# Patient Record
Sex: FEMALE | Race: WHITE | NOT HISPANIC OR LATINO | URBAN - METROPOLITAN AREA
[De-identification: names, ages, dates, MRNs, and addresses within clinical notes are randomized per-mention and may not be internally consistent; named-entity substitution may affect disease eponyms.]

---

## 2018-05-01 ENCOUNTER — INPATIENT (INPATIENT)
Facility: HOSPITAL | Age: 68
LOS: 4 days | Discharge: HOME | DRG: 291 | End: 2018-05-06
Attending: HOSPITALIST | Admitting: HOSPITALIST
Payer: MEDICARE

## 2018-05-01 VITALS
WEIGHT: 153 LBS | OXYGEN SATURATION: 94 % | RESPIRATION RATE: 36 BRPM | TEMPERATURE: 99 F | DIASTOLIC BLOOD PRESSURE: 77 MMHG | HEART RATE: 108 BPM | SYSTOLIC BLOOD PRESSURE: 170 MMHG | HEIGHT: 70 IN

## 2018-05-01 DIAGNOSIS — F32.9 MAJOR DEPRESSIVE DISORDER, SINGLE EPISODE, UNSPECIFIED: ICD-10-CM

## 2018-05-01 DIAGNOSIS — I50.9 HEART FAILURE, UNSPECIFIED: ICD-10-CM

## 2018-05-01 DIAGNOSIS — N18.5 CHRONIC KIDNEY DISEASE, STAGE 5: ICD-10-CM

## 2018-05-01 DIAGNOSIS — D64.9 ANEMIA, UNSPECIFIED: ICD-10-CM

## 2018-05-01 DIAGNOSIS — E11.9 TYPE 2 DIABETES MELLITUS WITHOUT COMPLICATIONS: ICD-10-CM

## 2018-05-01 DIAGNOSIS — I10 ESSENTIAL (PRIMARY) HYPERTENSION: ICD-10-CM

## 2018-05-01 DIAGNOSIS — Z29.9 ENCOUNTER FOR PROPHYLACTIC MEASURES, UNSPECIFIED: ICD-10-CM

## 2018-05-01 DIAGNOSIS — J44.1 CHRONIC OBSTRUCTIVE PULMONARY DISEASE WITH (ACUTE) EXACERBATION: ICD-10-CM

## 2018-05-01 DIAGNOSIS — J44.9 CHRONIC OBSTRUCTIVE PULMONARY DISEASE, UNSPECIFIED: ICD-10-CM

## 2018-05-01 DIAGNOSIS — R06.00 DYSPNEA, UNSPECIFIED: ICD-10-CM

## 2018-05-01 DIAGNOSIS — N18.9 CHRONIC KIDNEY DISEASE, UNSPECIFIED: ICD-10-CM

## 2018-05-01 DIAGNOSIS — N17.9 ACUTE KIDNEY FAILURE, UNSPECIFIED: ICD-10-CM

## 2018-05-01 LAB
ALBUMIN SERPL ELPH-MCNC: 3.1 G/DL — LOW (ref 3.3–5)
ALP SERPL-CCNC: 75 U/L — SIGNIFICANT CHANGE UP (ref 40–120)
ALT FLD-CCNC: 20 U/L — SIGNIFICANT CHANGE UP (ref 12–78)
ANION GAP SERPL CALC-SCNC: 10 MMOL/L — SIGNIFICANT CHANGE UP (ref 5–17)
AST SERPL-CCNC: 18 U/L — SIGNIFICANT CHANGE UP (ref 15–37)
BASOPHILS # BLD AUTO: 0.1 K/UL — SIGNIFICANT CHANGE UP (ref 0–0.2)
BASOPHILS NFR BLD AUTO: 1.2 % — SIGNIFICANT CHANGE UP (ref 0–2)
BILIRUB SERPL-MCNC: 0.3 MG/DL — SIGNIFICANT CHANGE UP (ref 0.2–1.2)
BUN SERPL-MCNC: 54 MG/DL — HIGH (ref 7–23)
CALCIUM SERPL-MCNC: 8.8 MG/DL — SIGNIFICANT CHANGE UP (ref 8.5–10.1)
CHLORIDE SERPL-SCNC: 107 MMOL/L — SIGNIFICANT CHANGE UP (ref 96–108)
CO2 SERPL-SCNC: 22 MMOL/L — SIGNIFICANT CHANGE UP (ref 22–31)
CREAT SERPL-MCNC: 4.1 MG/DL — HIGH (ref 0.5–1.3)
EOSINOPHIL # BLD AUTO: 0.3 K/UL — SIGNIFICANT CHANGE UP (ref 0–0.5)
EOSINOPHIL NFR BLD AUTO: 2.4 % — SIGNIFICANT CHANGE UP (ref 0–6)
FERRITIN SERPL-MCNC: 203 NG/ML — HIGH (ref 15–150)
FOLATE SERPL-MCNC: >20 NG/ML — SIGNIFICANT CHANGE UP
GLUCOSE SERPL-MCNC: 266 MG/DL — HIGH (ref 70–99)
HCT VFR BLD CALC: 28.2 % — LOW (ref 34.5–45)
HGB BLD-MCNC: 9.9 G/DL — LOW (ref 11.5–15.5)
INR BLD: 1.04 RATIO — SIGNIFICANT CHANGE UP (ref 0.88–1.16)
IRON SATN MFR SERPL: 18 % — SIGNIFICANT CHANGE UP (ref 14–50)
IRON SATN MFR SERPL: 45 UG/DL — SIGNIFICANT CHANGE UP (ref 30–160)
LACTATE SERPL-SCNC: 0.9 MMOL/L — SIGNIFICANT CHANGE UP (ref 0.7–2)
LYMPHOCYTES # BLD AUTO: 1.9 K/UL — SIGNIFICANT CHANGE UP (ref 1–3.3)
LYMPHOCYTES # BLD AUTO: 18.1 % — SIGNIFICANT CHANGE UP (ref 13–44)
MCHC RBC-ENTMCNC: 29.5 PG — SIGNIFICANT CHANGE UP (ref 27–34)
MCHC RBC-ENTMCNC: 35 GM/DL — SIGNIFICANT CHANGE UP (ref 32–36)
MCV RBC AUTO: 84.3 FL — SIGNIFICANT CHANGE UP (ref 80–100)
MONOCYTES # BLD AUTO: 0.8 K/UL — SIGNIFICANT CHANGE UP (ref 0–0.9)
MONOCYTES NFR BLD AUTO: 7.8 % — SIGNIFICANT CHANGE UP (ref 1–9)
NEUTROPHILS # BLD AUTO: 7.6 K/UL — HIGH (ref 1.8–7.4)
NEUTROPHILS NFR BLD AUTO: 70.4 % — SIGNIFICANT CHANGE UP (ref 43–77)
PLATELET # BLD AUTO: 420 K/UL — HIGH (ref 150–400)
POTASSIUM SERPL-MCNC: 4.1 MMOL/L — SIGNIFICANT CHANGE UP (ref 3.5–5.3)
POTASSIUM SERPL-SCNC: 4.1 MMOL/L — SIGNIFICANT CHANGE UP (ref 3.5–5.3)
PROT SERPL-MCNC: 7.3 G/DL — SIGNIFICANT CHANGE UP (ref 6–8.3)
PROTHROM AB SERPL-ACNC: 11.4 SEC — SIGNIFICANT CHANGE UP (ref 9.8–12.7)
RBC # BLD: 3.34 M/UL — LOW (ref 3.8–5.2)
RBC # FLD: 14.2 % — SIGNIFICANT CHANGE UP (ref 10.3–14.5)
SODIUM SERPL-SCNC: 139 MMOL/L — SIGNIFICANT CHANGE UP (ref 135–145)
T3 SERPL-MCNC: 87 NG/DL — SIGNIFICANT CHANGE UP (ref 80–200)
T4 AB SER-ACNC: 7.8 UG/DL — SIGNIFICANT CHANGE UP (ref 4.6–12)
TIBC SERPL-MCNC: 245 UG/DL — SIGNIFICANT CHANGE UP (ref 220–430)
UIBC SERPL-MCNC: 200 UG/DL — SIGNIFICANT CHANGE UP (ref 110–370)
VIT B12 SERPL-MCNC: 905 PG/ML — SIGNIFICANT CHANGE UP (ref 232–1245)
WBC # BLD: 10.8 K/UL — HIGH (ref 3.8–10.5)
WBC # FLD AUTO: 10.8 K/UL — HIGH (ref 3.8–10.5)

## 2018-05-01 PROCEDURE — 93010 ELECTROCARDIOGRAM REPORT: CPT

## 2018-05-01 PROCEDURE — 99221 1ST HOSP IP/OBS SF/LOW 40: CPT

## 2018-05-01 PROCEDURE — 12345: CPT | Mod: NC

## 2018-05-01 PROCEDURE — 36558 INSERT TUNNELED CV CATH: CPT | Mod: AS

## 2018-05-01 PROCEDURE — 71250 CT THORAX DX C-: CPT | Mod: 26

## 2018-05-01 PROCEDURE — 93306 TTE W/DOPPLER COMPLETE: CPT | Mod: 26

## 2018-05-01 PROCEDURE — 99223 1ST HOSP IP/OBS HIGH 75: CPT | Mod: AI,GC

## 2018-05-01 PROCEDURE — 99285 EMERGENCY DEPT VISIT HI MDM: CPT

## 2018-05-01 PROCEDURE — 71045 X-RAY EXAM CHEST 1 VIEW: CPT | Mod: 26

## 2018-05-01 RX ORDER — FUROSEMIDE 40 MG
60 TABLET ORAL ONCE
Qty: 0 | Refills: 0 | Status: COMPLETED | OUTPATIENT
Start: 2018-05-01 | End: 2018-05-01

## 2018-05-01 RX ORDER — GLUCAGON INJECTION, SOLUTION 0.5 MG/.1ML
1 INJECTION, SOLUTION SUBCUTANEOUS ONCE
Qty: 0 | Refills: 0 | Status: DISCONTINUED | OUTPATIENT
Start: 2018-05-01 | End: 2018-05-04

## 2018-05-01 RX ORDER — METOPROLOL TARTRATE 50 MG
50 TABLET ORAL DAILY
Qty: 0 | Refills: 0 | Status: DISCONTINUED | OUTPATIENT
Start: 2018-05-01 | End: 2018-05-04

## 2018-05-01 RX ORDER — FUROSEMIDE 40 MG
40 TABLET ORAL ONCE
Qty: 0 | Refills: 0 | Status: DISCONTINUED | OUTPATIENT
Start: 2018-05-01 | End: 2018-05-01

## 2018-05-01 RX ORDER — SODIUM CHLORIDE 9 MG/ML
1000 INJECTION INTRAMUSCULAR; INTRAVENOUS; SUBCUTANEOUS ONCE
Qty: 0 | Refills: 0 | Status: COMPLETED | OUTPATIENT
Start: 2018-05-01 | End: 2018-05-01

## 2018-05-01 RX ORDER — AMLODIPINE BESYLATE 2.5 MG/1
2.5 TABLET ORAL AT BEDTIME
Qty: 0 | Refills: 0 | Status: DISCONTINUED | OUTPATIENT
Start: 2018-05-01 | End: 2018-05-04

## 2018-05-01 RX ORDER — METFORMIN HYDROCHLORIDE 850 MG/1
1 TABLET ORAL
Qty: 0 | Refills: 0 | COMMUNITY

## 2018-05-01 RX ORDER — DEXTROSE 50 % IN WATER 50 %
1 SYRINGE (ML) INTRAVENOUS ONCE
Qty: 0 | Refills: 0 | Status: DISCONTINUED | OUTPATIENT
Start: 2018-05-01 | End: 2018-05-04

## 2018-05-01 RX ORDER — DEXTROSE 50 % IN WATER 50 %
12.5 SYRINGE (ML) INTRAVENOUS ONCE
Qty: 0 | Refills: 0 | Status: DISCONTINUED | OUTPATIENT
Start: 2018-05-01 | End: 2018-05-04

## 2018-05-01 RX ORDER — SPIRONOLACTONE 25 MG/1
50 TABLET, FILM COATED ORAL ONCE
Qty: 0 | Refills: 0 | Status: COMPLETED | OUTPATIENT
Start: 2018-05-01 | End: 2018-05-01

## 2018-05-01 RX ORDER — TIOTROPIUM BROMIDE 18 UG/1
1 CAPSULE ORAL; RESPIRATORY (INHALATION) DAILY
Qty: 0 | Refills: 0 | Status: DISCONTINUED | OUTPATIENT
Start: 2018-05-01 | End: 2018-05-04

## 2018-05-01 RX ORDER — ATORVASTATIN CALCIUM 80 MG/1
1 TABLET, FILM COATED ORAL
Qty: 0 | Refills: 0 | COMMUNITY

## 2018-05-01 RX ORDER — ALBUTEROL 90 UG/1
2.5 AEROSOL, METERED ORAL EVERY 6 HOURS
Qty: 0 | Refills: 0 | Status: DISCONTINUED | OUTPATIENT
Start: 2018-05-01 | End: 2018-05-04

## 2018-05-01 RX ORDER — ASPIRIN/CALCIUM CARB/MAGNESIUM 324 MG
81 TABLET ORAL DAILY
Qty: 0 | Refills: 0 | Status: DISCONTINUED | OUTPATIENT
Start: 2018-05-01 | End: 2018-05-01

## 2018-05-01 RX ORDER — INSULIN GLARGINE 100 [IU]/ML
7 INJECTION, SOLUTION SUBCUTANEOUS AT BEDTIME
Qty: 0 | Refills: 0 | Status: DISCONTINUED | OUTPATIENT
Start: 2018-05-01 | End: 2018-05-01

## 2018-05-01 RX ORDER — INSULIN LISPRO 100/ML
VIAL (ML) SUBCUTANEOUS
Qty: 0 | Refills: 0 | Status: DISCONTINUED | OUTPATIENT
Start: 2018-05-01 | End: 2018-05-02

## 2018-05-01 RX ORDER — FUROSEMIDE 40 MG
40 TABLET ORAL
Qty: 0 | Refills: 0 | Status: DISCONTINUED | OUTPATIENT
Start: 2018-05-01 | End: 2018-05-01

## 2018-05-01 RX ORDER — HEPARIN SODIUM 5000 [USP'U]/ML
5000 INJECTION INTRAVENOUS; SUBCUTANEOUS EVERY 12 HOURS
Qty: 0 | Refills: 0 | Status: DISCONTINUED | OUTPATIENT
Start: 2018-05-01 | End: 2018-05-04

## 2018-05-01 RX ORDER — AMLODIPINE BESYLATE 2.5 MG/1
5 TABLET ORAL DAILY
Qty: 0 | Refills: 0 | Status: DISCONTINUED | OUTPATIENT
Start: 2018-05-01 | End: 2018-05-04

## 2018-05-01 RX ORDER — SODIUM CHLORIDE 9 MG/ML
1000 INJECTION, SOLUTION INTRAVENOUS
Qty: 0 | Refills: 0 | Status: DISCONTINUED | OUTPATIENT
Start: 2018-05-01 | End: 2018-05-04

## 2018-05-01 RX ORDER — IPRATROPIUM/ALBUTEROL SULFATE 18-103MCG
3 AEROSOL WITH ADAPTER (GRAM) INHALATION EVERY 6 HOURS
Qty: 0 | Refills: 0 | Status: DISCONTINUED | OUTPATIENT
Start: 2018-05-01 | End: 2018-05-01

## 2018-05-01 RX ORDER — SODIUM CHLORIDE 9 MG/ML
1000 INJECTION INTRAMUSCULAR; INTRAVENOUS; SUBCUTANEOUS
Qty: 0 | Refills: 0 | Status: DISCONTINUED | OUTPATIENT
Start: 2018-05-01 | End: 2018-05-01

## 2018-05-01 RX ORDER — DEXTROSE 50 % IN WATER 50 %
25 SYRINGE (ML) INTRAVENOUS ONCE
Qty: 0 | Refills: 0 | Status: DISCONTINUED | OUTPATIENT
Start: 2018-05-01 | End: 2018-05-04

## 2018-05-01 RX ORDER — INSULIN GLARGINE 100 [IU]/ML
14 INJECTION, SOLUTION SUBCUTANEOUS AT BEDTIME
Qty: 0 | Refills: 0 | Status: DISCONTINUED | OUTPATIENT
Start: 2018-05-01 | End: 2018-05-02

## 2018-05-01 RX ORDER — ENOXAPARIN SODIUM 100 MG/ML
40 INJECTION SUBCUTANEOUS DAILY
Qty: 0 | Refills: 0 | Status: DISCONTINUED | OUTPATIENT
Start: 2018-05-01 | End: 2018-05-01

## 2018-05-01 RX ORDER — ERYTHROPOIETIN 10000 [IU]/ML
10000 INJECTION, SOLUTION INTRAVENOUS; SUBCUTANEOUS ONCE
Qty: 0 | Refills: 0 | Status: COMPLETED | OUTPATIENT
Start: 2018-05-01 | End: 2018-05-01

## 2018-05-01 RX ORDER — FUROSEMIDE 40 MG
40 TABLET ORAL
Qty: 0 | Refills: 0 | Status: DISCONTINUED | OUTPATIENT
Start: 2018-05-01 | End: 2018-05-04

## 2018-05-01 RX ORDER — IPRATROPIUM/ALBUTEROL SULFATE 18-103MCG
3 AEROSOL WITH ADAPTER (GRAM) INHALATION ONCE
Qty: 0 | Refills: 0 | Status: COMPLETED | OUTPATIENT
Start: 2018-05-01 | End: 2018-05-01

## 2018-05-01 RX ORDER — SODIUM CHLORIDE 9 MG/ML
1000 INJECTION INTRAMUSCULAR; INTRAVENOUS; SUBCUTANEOUS ONCE
Qty: 0 | Refills: 0 | Status: DISCONTINUED | OUTPATIENT
Start: 2018-05-01 | End: 2018-05-01

## 2018-05-01 RX ORDER — LANOLIN ALCOHOL/MO/W.PET/CERES
3 CREAM (GRAM) TOPICAL AT BEDTIME
Qty: 0 | Refills: 0 | Status: DISCONTINUED | OUTPATIENT
Start: 2018-05-01 | End: 2018-05-04

## 2018-05-01 RX ADMIN — HEPARIN SODIUM 5000 UNIT(S): 5000 INJECTION INTRAVENOUS; SUBCUTANEOUS at 17:11

## 2018-05-01 RX ADMIN — AMLODIPINE BESYLATE 2.5 MILLIGRAM(S): 2.5 TABLET ORAL at 21:30

## 2018-05-01 RX ADMIN — SODIUM CHLORIDE 60 MILLILITER(S): 9 INJECTION INTRAMUSCULAR; INTRAVENOUS; SUBCUTANEOUS at 06:32

## 2018-05-01 RX ADMIN — Medication 3 MILLILITER(S): at 03:40

## 2018-05-01 RX ADMIN — Medication 40 MILLIGRAM(S): at 17:11

## 2018-05-01 RX ADMIN — Medication 5: at 17:12

## 2018-05-01 RX ADMIN — SPIRONOLACTONE 50 MILLIGRAM(S): 25 TABLET, FILM COATED ORAL at 14:07

## 2018-05-01 RX ADMIN — ERYTHROPOIETIN 10000 UNIT(S): 10000 INJECTION, SOLUTION INTRAVENOUS; SUBCUTANEOUS at 13:40

## 2018-05-01 RX ADMIN — Medication 50 MILLIGRAM(S): at 11:57

## 2018-05-01 RX ADMIN — Medication 125 MILLIGRAM(S): at 03:45

## 2018-05-01 RX ADMIN — SODIUM CHLORIDE 1000 MILLILITER(S): 9 INJECTION INTRAMUSCULAR; INTRAVENOUS; SUBCUTANEOUS at 04:13

## 2018-05-01 RX ADMIN — SODIUM CHLORIDE 60 MILLILITER(S): 9 INJECTION INTRAMUSCULAR; INTRAVENOUS; SUBCUTANEOUS at 09:51

## 2018-05-01 RX ADMIN — Medication 81 MILLIGRAM(S): at 11:57

## 2018-05-01 RX ADMIN — Medication 100 MILLIGRAM(S): at 06:22

## 2018-05-01 RX ADMIN — Medication 60 MILLIGRAM(S): at 11:57

## 2018-05-01 RX ADMIN — INSULIN GLARGINE 14 UNIT(S): 100 INJECTION, SOLUTION SUBCUTANEOUS at 22:17

## 2018-05-01 RX ADMIN — Medication 3 MILLIGRAM(S): at 21:30

## 2018-05-01 RX ADMIN — Medication 60 MILLIGRAM(S): at 12:51

## 2018-05-01 RX ADMIN — Medication 100 MILLIGRAM(S): at 21:30

## 2018-05-01 RX ADMIN — Medication 100 MILLIGRAM(S): at 17:12

## 2018-05-01 RX ADMIN — Medication 5: at 13:11

## 2018-05-01 RX ADMIN — AMLODIPINE BESYLATE 5 MILLIGRAM(S): 2.5 TABLET ORAL at 11:57

## 2018-05-01 RX ADMIN — Medication 5: at 09:12

## 2018-05-01 NOTE — ED PROVIDER NOTE - PSH
coronary stent 2007    s/p Ovarian cyst removal    s/p surgical removal of benign Skin lesion epigastric area

## 2018-05-01 NOTE — H&P ADULT - PROBLEM SELECTOR PLAN 2
Acute.   - continue NSS @ 75 cc/hr  - HOLD NSAIDs, ACE, ARBs   - trend BUN/Cr Acute. No prior history of HF, elevated pro BNP (33,668).   - f/u TTE  - Cardio ([facility]) consulted.

## 2018-05-01 NOTE — H&P ADULT - PROBLEM SELECTOR PLAN 7
DVT ppx - Heparin 5000 subQ q12H  Diet: Consistent Carb   Activity - out of bed to sarabjit  Fall precautions

## 2018-05-01 NOTE — H&P ADULT - PROBLEM SELECTOR PLAN 4
Chronic. Elevated due to respiratory distress   - Continue home meds with hold parameters Chronic. On Lantus 14 units qHS  - START Lantus 7 units qHS  - Low dose ISS, Accuchecks, Hypoglycemic protocol   - f/u HgbA1c  - Diet: Consistent carb

## 2018-05-01 NOTE — PROGRESS NOTE ADULT - ASSESSMENT
68 y/o  female PMHx Type 2 Diabetes on lantus, HTN, CAD s/p stent (2007), Depression, Anxiety, former smoker brought in by EMS for shortness of breath admitted to telemetry for acute CHF, COPD exacerbation, DAVIS on CKD 68 y/o  female PMHx Type 2 Diabetes on lantus, HTN, CAD s/p stent (2007), Depression, Anxiety, former smoker brought in by EMS for shortness of breath, admitted with CHF, DAVIS on CKD

## 2018-05-01 NOTE — ED PROVIDER NOTE - PMH
CAD (coronary artery disease)    Depression    Diabetes mellitus II    h/o Anxiety attack    h/o Hepatitis A 1969  currently resolved, no symptoms  h/o Myocardial infarct 2007    h/o Smoking  quitted 3/2012  HTN (hypertension)    Murmur, cardiac    PAD (peripheral artery disease)

## 2018-05-01 NOTE — H&P ADULT - NSHPREVIEWOFSYSTEMS_GEN_ALL_CORE
Constitutional: denies fever, chills, diaphoresis   HEENT: denies blurry vision, difficulty hearing  Respiratory: denies SOB, ADAMSON, cough, sputum production, wheezing, hemoptysis  Cardiovascular: denies CP, palpitations, edema  Gastrointestinal: denies nausea, vomiting, diarrhea, constipation, abdominal pain, melena, hematochezia   Genitourinary: denies dysuria, frequency, urgency, hematuria   Skin/Breast: denies rash, itching  Musculoskeletal: denies myalgias, joint swelling, muscle weakness  Neurologic: denies headache, weakness, dizziness, paresthesias, numbness/tingling  Psychiatric: denies feeling anxious, depressed, suicidal, homicidal thoughts  Hematology/Oncology: denies bruising, tender or enlarged lymph nodes   ROS negative except as noted above Constitutional: (-) denies fever, chills, diaphoresis   HEENT: denies blurry vision, difficulty hearing  Respiratory: (+) admits SOB, ADAMSON, cough, sputum production  Cardiovascular: (-) denies CP, palpitations, (+) edema  Gastrointestinal: denies nausea, vomiting, diarrhea, constipation, abdominal pain, melena, hematochezia   Genitourinary: denies dysuria, frequency, urgency, hematuria   Skin/Breast: denies rash, itching  Musculoskeletal: denies myalgias, joint swelling, muscle weakness  Neurologic: denies headache, weakness, dizziness, paresthesias, numbness/tingling  Psychiatric: denies feeling anxious, depressed, suicidal, homicidal thoughts  Hematology/Oncology: denies bruising, tender or enlarged lymph nodes   ROS negative except as noted above

## 2018-05-01 NOTE — ED PROVIDER NOTE - CARE PLAN
Principal Discharge DX:	COPD with acute exacerbation  Secondary Diagnosis:	Acute renal failure Principal Discharge DX:	COPD with acute exacerbation  Secondary Diagnosis:	Acute renal failure  Secondary Diagnosis:	Pleural effusion

## 2018-05-01 NOTE — H&P ADULT - PROBLEM SELECTOR PLAN 1
Likely viral vs bacterial pna.   - Admit to telemetry   - Continuous pulse ox  - Continue Duonebs q6H, Solumedrol, Robitussin Likely viral vs bacterial pna.   - Admit to telemetry   - Continuous pulse ox  - Continue Duonebs q6H, Solumedrol, Robitussin  - Pulm (Dr. Oneill) consulted. etiology unknown  - Admit to telemetry   - Continuous pulse ox  - Continue Duonebs q6H, Solumedrol, Robitussin  - Pulm (Dr. Oneill) consulted.

## 2018-05-01 NOTE — H&P ADULT - HISTORY OF PRESENT ILLNESS
Patient is a 66 y/o  female PMHx Type 2 Diabetes, HTN, CAD s/p stents (2007)  brought in by EMS for shortness of breath.       In ED afebrile, tachycardic, hypertensive, SpO2 81% on RA, 94% on 2LNC. Labs significant for a WBC 10.8, BUN 54, Cr 4.1, Glucose 266, Lactate 0.9. CT chest small to moderate b/l pleural effusions, right > left. Meds: Duonebs, Solumedrol, Patient is a 68 y/o  female PMHx Type 2 Diabetes on lantus, HTN, CAD s/p stent (2007), Depression, Anxiety, former smoker brought in by EMS for shortness of breath. The sob has progressed over 1 week. The sob is associated with a productive cough with clear sputum. The sob/cough are worse with activity and relieved with rest. She admits to swelling in the feet that has increased this week. She denies recent travel or sick contacts. She received a flu vaccine this year. She denies fever, chills, cp, palpitations, N/V/C/D, abdominal pain      In ED afebrile, tachycardic, hypertensive, SpO2 81% on RA, 94% on 2LNC. Labs significant for a WBC 10.8, BUN 54, Cr 4.1, Glucose 266, Lactate 0.9. CT chest small to moderate b/l pleural effusions, right > left. Meds: Duonebs, Solumedrol, Patient is a 66 y/o  female PMHx Type 2 Diabetes on lantus, HTN, CAD s/p stent (2007), Depression, Anxiety, former smoker brought in by EMS for shortness of breath. The sob has progressed over 1 week. The sob is associated with a productive cough with clear sputum. The sob/cough are worse with activity and relieved with rest. She admits to swelling in the feet that has increased this week. She denies recent travel or sick contacts. She received a flu vaccine this year. She denies fever, chills, cp, palpitations, N/V/C/D, abdominal pain.     In ED afebrile, tachycardic, hypertensive, SpO2 81% on RA, 94% on 2LNC. Labs significant for a WBC 10.8, BUN 54, Cr 4.1, Glucose 266, Lactate 0.9. CT chest small to moderate b/l pleural effusions, right > left. Meds: Duonebs, Solumedrol,

## 2018-05-01 NOTE — CONSULT NOTE ADULT - ATTENDING COMMENTS
I saw and examined the patient personally. Spoke with above provider regarding this case. I reviewed the above findings completely.  I agree with the above history, physical, and plan which I have edited where appropriate.

## 2018-05-01 NOTE — CONSULT NOTE ADULT - PROBLEM SELECTOR RECOMMENDATION 9
dyspnea,   chf  pulm edema  renal failure  hx of copd, no evidence of bronchospasm  will dc Solumedrol  will add Spiriva for daily inhalation use  will change NEBS to PRN for SOB and or Wheezing  o2 support  keep sat > 88 pct  TTE pending  cardio eval pending  pt is on IVF for renal failure, suspect this is a chronic issue, would exercise caution with IVF - pulm edema and effusions evident on CT chest  I and O  serial labs, vs and HD monitoring  effusions described and noted on ct chest reviewed  will hold off on thoracentesis eval at present, not big enough to warrant immediate thoracentesis, medical management for now  will check urine tox  will check Thyroid panel

## 2018-05-01 NOTE — ED ADULT NURSE NOTE - OBJECTIVE STATEMENT
patient a/o x 4 with an anxious affect c/o SOB for a few days gradually get worse.  patient states she gets SOB after little movement.  breath sounds diminished bilaterally. breathing treatments initiated upon arrival, pending results of initial lab work.  RN will continue to monitor patient closely while on cardiac monitor

## 2018-05-01 NOTE — ED ADULT NURSE NOTE - CAS TRG GENERAL NORM CIRC DET
Pt will be going to [facility] for further evaluation and treatment. Pt going by [facility]. Verbal report given to Liudmila ARZATE   Capillary refill less/equal to 2 seconds/Strong peripheral pulses

## 2018-05-01 NOTE — PROGRESS NOTE ADULT - PROBLEM SELECTOR PLAN 3
-DAVIS on CKD  -continue NSS @ 60 cc/hr  - HOLD NSAIDs, ACE, ARBs   - trend BUN/Cr  - Nephro (Dr. Reina) consulted. Normal MCV  Order iron panel, folate, b12 Normal MCV has ch anemia 2/2 CKD  Order iron panel, folate, b12.

## 2018-05-01 NOTE — PROGRESS NOTE ADULT - PROBLEM SELECTOR PLAN 4
Normal MCV  Order iron panel, folate, b12 -This is likely contributing to patient's shortness of breath, however patient has never been diagnosed with COPD, will need PFT outpatient.  -Continue d Smoker, no non Hx of COPD.   out pt f/u with pul. and PFTs.

## 2018-05-01 NOTE — CONSULT NOTE ADULT - ASSESSMENT
Patient is a 68 y/o  female PMHx Type 2 Diabetes on lantus, HTN, CAD s/p stent (2007), Depression, Anxiety, former smoker brought in by EMS for shortness of breath. The sob has progressed over 1 week. The sob is associated with a productive cough with clear sputum. The sob/cough are worse with activity and relieved with rest. She admits to swelling in the feet that has increased this week. She denies recent travel or sick contacts. She received a flu vaccine this year. She denies fever, chills, cp, palpitations, N/V/C/D, abdominal pain. Patient is a 66 y/o  female PMHx Type 2 Diabetes on lantus, HTN, CAD s/p stent (2007), Depression, Anxiety, former smoker brought in by EMS for shortness of breath. pt was seen in my office last week with no complains , try to set up hemodialysis for pt

## 2018-05-01 NOTE — CONSULT NOTE ADULT - SUBJECTIVE AND OBJECTIVE BOX
Date/Time Patient Seen:  		  Referring MD:   Data Reviewed	       Patient is a 67y old  Female who presents with a chief complaint of dypsnea (01 May 2018 05:21)      Subjective/HPI  in bed  seen and examined  vs and meds reviewed  alert  oriented  verbal  sob  aldana  hx of renal disease  in ED labs show cr > 4  cxr and ct chest reviewed    H and P reviewed    History of Present Illness:  Reason for Admission: dypsnea  History of Present Illness:   Patient is a 68 y/o  female PMHx Type 2 Diabetes on lantus, HTN, CAD s/p stent (2007), Depression, Anxiety, former smoker brought in by EMS for shortness of breath. The sob has progressed over 1 week. The sob is associated with a productive cough with clear sputum. The sob/cough are worse with activity and relieved with rest. She admits to swelling in the feet that has increased this week. She denies recent travel or sick contacts. She received a flu vaccine this year. She denies fever, chills, cp, palpitations, N/V/C/D, abdominal pain.      PAST MEDICAL & SURGICAL HISTORY:  h/o Smoking: quitted 3/2012  Murmur, cardiac  PAD (peripheral artery disease)  h/o Hepatitis A 1969: currently resolved, no symptoms  CAD (coronary artery disease)  CAD (coronary artery disease)  h/o Myocardial infarct 2007  Depression  h/o Anxiety attack  HTN (hypertension)  Diabetes mellitus II  s/p surgical removal of benign Skin lesion epigastric area  s/p Ovarian cyst removal  coronary stent 2007        Medication list         MEDICATIONS  (STANDING):  amLODIPine   Tablet 2.5 milliGRAM(s) Oral at bedtime  amLODIPine   Tablet 5 milliGRAM(s) Oral daily  aspirin enteric coated 81 milliGRAM(s) Oral daily  dextrose 5%. 1000 milliLiter(s) (50 mL/Hr) IV Continuous <Continuous>  dextrose 50% Injectable 12.5 Gram(s) IV Push once  dextrose 50% Injectable 25 Gram(s) IV Push once  dextrose 50% Injectable 25 Gram(s) IV Push once  guaiFENesin   Syrup  (Sugar-Free) 100 milliGRAM(s) Oral every 6 hours  heparin  Injectable 5000 Unit(s) SubCutaneous every 12 hours  imipramine 50 milliGRAM(s) Oral daily  insulin glargine Injectable (LANTUS) 7 Unit(s) SubCutaneous at bedtime  insulin lispro (HumaLOG) corrective regimen sliding scale   SubCutaneous three times a day before meals  metoprolol succinate ER 50 milliGRAM(s) Oral daily  sodium chloride 0.9%. 1000 milliLiter(s) (60 mL/Hr) IV Continuous <Continuous>  tiotropium 18 MICROgram(s) Capsule 1 Capsule(s) Inhalation daily    MEDICATIONS  (PRN):  ALBUTerol    0.083% 2.5 milliGRAM(s) Nebulizer every 6 hours PRN Shortness of Breath and/or Wheezing  dextrose Gel 1 Dose(s) Oral once PRN Blood Glucose LESS THAN 70 milliGRAM(s)/deciliter  glucagon  Injectable 1 milliGRAM(s) IntraMuscular once PRN Glucose LESS THAN 70 milligrams/deciliter         Vitals log        ICU Vital Signs Last 24 Hrs  T(C): 36.9 (01 May 2018 05:25), Max: 37 (01 May 2018 03:23)  T(F): 98.4 (01 May 2018 05:25), Max: 98.6 (01 May 2018 03:23)  HR: 102 (01 May 2018 05:25) (102 - 108)  BP: 148/60 (01 May 2018 05:25) (148/60 - 170/77)  BP(mean): --  ABP: --  ABP(mean): --  RR: 23 (01 May 2018 05:25) (23 - 36)  SpO2: 95% (01 May 2018 05:25) (94% - 95%)           Input and Output:  I&O's Detail    30 Apr 2018 07:01  -  01 May 2018 07:00  --------------------------------------------------------  IN:    Sodium Chloride 0.9% IV Bolus: 1000 mL  Total IN: 1000 mL    OUT:  Total OUT: 0 mL    Total NET: 1000 mL          Lab Data                        9.9    10.8  )-----------( 420      ( 01 May 2018 03:45 )             28.2     05-01    139  |  107  |  54<H>  ----------------------------<  266<H>  4.1   |  22  |  4.10<H>    Ca    8.8      01 May 2018 03:45    TPro  7.3  /  Alb  3.1<L>  /  TBili  0.3  /  DBili  x   /  AST  18  /  ALT  20  /  AlkPhos  75  05-01      ex smoker  non drinker     lives at home        Review of Systems	  sob  aldana      Objective     Physical Examination    extr trace edema  head at  heart s1s2  lung dec BS  abd soft  no wheezing      Pertinent Lab findings & Imaging      Renteria:  NO   Adequate UO     I&O's Detail    30 Apr 2018 07:01  -  01 May 2018 07:00  --------------------------------------------------------  IN:    Sodium Chloride 0.9% IV Bolus: 1000 mL  Total IN: 1000 mL    OUT:  Total OUT: 0 mL    Total NET: 1000 mL               Discussed with:     Cultures:	        Radiology    ct chest atelectasis and effusions bilateral

## 2018-05-01 NOTE — H&P ADULT - ASSESSMENT
68 y/o  female PMHx Type 2 Diabetes on lantus, HTN, CAD s/p stent (2007), Depression, Anxiety, former smoker brought in by EMS for shortness of breath admitted to telemetry for acute CHF, COPD exacerbation, DAVIS.

## 2018-05-01 NOTE — ED PROVIDER NOTE - OBJECTIVE STATEMENT
66yo female bib ems with sob for a few days. pt c/o worsening cough and sob, no fever, chills, pt denies diagnosis of copd states she has never been to a pulmonologist, hx of smoking but not now, no recent travel or sick contacts,

## 2018-05-01 NOTE — PROGRESS NOTE ADULT - PROBLEM SELECTOR PLAN 5
-Chronic. On Lantus 14 units qHS at home  - Started on Lantus 7 units qHS, however blood glucose continues to be above 250, increase insulin dose  - Low dose Steve PÉREZ, Hypoglycemic protocol   - f/u HgbA1c  - Diet: Consistent carb -Chronic. On Lantus 14 units qHS at home  - Started on Lantus 14 units qHS, however blood glucose continues to be above 250, increase insulin dose  - Low dose Steve PÉREZ, Hypoglycemic protocol   - f/u HgbA1c  - Diet: Consistent carb

## 2018-05-01 NOTE — H&P ADULT - PROBLEM SELECTOR PLAN 6
DVT ppx - Lovenox  Diet: Consistent Carb   Activity - out of bed to sarabjit  Fall precautions Chronic.   - Continue Imipramine

## 2018-05-01 NOTE — PROGRESS NOTE ADULT - PROBLEM SELECTOR PLAN 1
etiology unknown  - Admit to telemetry   - Continuous pulse ox  - Continue Duonebs q6H, Solumedrol, Robitussin  - Pulm (Dr. Oneill) consulted. -Patient has no prior history of HF, however pro BNP (33,668).   -Start on Lasix 40mg BID  - f/u TTE  - Cardio ([facility]) consulted.  -Patient follows Dr. Flo Rios (cardio). Call about cardiac history  -Strict I and Os -Patient has no prior history of HF, however pro BNP (33,668).   -Start on Lasix 40mg IV BID. additional Lasix given as per renal  - f/u TTE  - Cardio ([facility]) consulted.  -Patient follows Dr. Flo Rios (cardio). Call about cardiac history  -Strict I and Os

## 2018-05-01 NOTE — CONSULT NOTE ADULT - PROBLEM SELECTOR RECOMMENDATION 2
ANEMIA PLAN:  Anemia of chronic disease:  Well controlled by Aranesp  H and H subtherapeutic .  We will continue Aranesp aiming for a HCT of 32-36 %.   We will monitor Iron stores, B12 and RBC folate . ANEMIA PLAN:  Anemia of chronic disease:  Well controlled by epogen   H and H subtherapeutic .  We will continue epogen  aiming for a HCT of 32-36 %.   We will monitor Iron stores, B12 and RBC folate .

## 2018-05-01 NOTE — ED ADULT NURSE REASSESSMENT NOTE - NS ED NURSE REASSESS COMMENT FT1
received report from Donaldo OHARA md aware of vitals. safety maintained. pt mentating at baseline. no change in neuro status. pt denies pain at this time patient on cardiac monitor. pt placed in position of comfort, given warm blankets. safety maintained. will continue to monitor.

## 2018-05-01 NOTE — CONSULT NOTE ADULT - PROBLEM SELECTOR RECOMMENDATION 9
ckd stage 5 due to maney years of poor dm controlled   pt may need start hd soon  fluid overload dc iv fluid   diuresis per cardiology ckd stage 5 due to maney years of poor dm controlled   pt may need start hd soon  fluid overload dc iv fluid   start lasix 80 mg iv q 12

## 2018-05-01 NOTE — H&P ADULT - PROBLEM SELECTOR PLAN 3
Chronic. Not on insulin   - Low dose ISS, Accuchecks, Hypoglycemic protocol   - f/u HgbA1c  - Diet: Consistent carb Acute.   - continue NSS @ 60 cc/hr  - HOLD NSAIDs, ACE, ARBs   - trend BUN/Cr Acute on Chronic?  - continue NSS @ 60 cc/hr  - HOLD NSAIDs, ACE, ARBs   - trend BUN/Cr  - Nephro (Dr. Reina) consulted.

## 2018-05-01 NOTE — CONSULT NOTE ADULT - SUBJECTIVE AND OBJECTIVE BOX
[facility] - Lorraine Gruber, Dee Larry, Joseph Carson Savella  Office Number: [phone]    Initial Consult Note    CHIEF COMPLAINT: Patient is a 67y old  Female who presents with a chief complaint of dypsnea (01 May 2018 05:21)      HPI: Patient is a 68 y/o  female PMHx Type 2 Diabetes on lantus, HTN, CAD s/p stent (), Depression, Anxiety, former smoker brought in by EMS for shortness of breath. The sob has progressed over 1 week. The sob is associated with a productive cough with clear sputum. The sob/cough are worse with activity and relieved with rest. She admits to swelling in the feet that has increased this week. Patient states that she gets sob when going up stairs and walking down a block. She came to ED because she was unable to sleep from difficulty breathing. She denies recent travel or sick contacts. She received a flu vaccine this year. She denies fever, chills, cp, palpitations, N/V/C/D, abdominal pain.     PAST MEDICAL & SURGICAL HISTORY:  h/o Smoking: quitted 3/2012  Murmur, cardiac  PAD (peripheral artery disease)  h/o Hepatitis A 1969: currently resolved, no symptoms  CAD (coronary artery disease)  h/o Myocardial infarct   Depression  h/o Anxiety attack  HTN (hypertension)  Diabetes mellitus II  s/p surgical removal of benign Skin lesion epigastric area  s/p Ovarian cyst removal  coronary stent     SOCIAL HISTORY:  Tobacco: former 2 pack/day, quit last year  Etoh: denies    FAMILY HISTORY:  Father-  at 59, MI  Mother-  at 83, Alzheimer's    MEDICATIONS  (STANDING):  amLODIPine   Tablet 2.5 milliGRAM(s) Oral at bedtime  amLODIPine   Tablet 5 milliGRAM(s) Oral daily  aspirin enteric coated 81 milliGRAM(s) Oral daily  dextrose 5%. 1000 milliLiter(s) (50 mL/Hr) IV Continuous <Continuous>  dextrose 50% Injectable 12.5 Gram(s) IV Push once  dextrose 50% Injectable 25 Gram(s) IV Push once  dextrose 50% Injectable 25 Gram(s) IV Push once  guaiFENesin   Syrup  (Sugar-Free) 100 milliGRAM(s) Oral every 6 hours  heparin  Injectable 5000 Unit(s) SubCutaneous every 12 hours  imipramine 50 milliGRAM(s) Oral daily  insulin glargine Injectable (LANTUS) 7 Unit(s) SubCutaneous at bedtime  insulin lispro (HumaLOG) corrective regimen sliding scale   SubCutaneous three times a day before meals  metoprolol succinate ER 50 milliGRAM(s) Oral daily  sodium chloride 0.9%. 1000 milliLiter(s) (60 mL/Hr) IV Continuous <Continuous>  tiotropium 18 MICROgram(s) Capsule 1 Capsule(s) Inhalation daily    MEDICATIONS  (PRN):  ALBUTerol    0.083% 2.5 milliGRAM(s) Nebulizer every 6 hours PRN Shortness of Breath and/or Wheezing  dextrose Gel 1 Dose(s) Oral once PRN Blood Glucose LESS THAN 70 milliGRAM(s)/deciliter  glucagon  Injectable 1 milliGRAM(s) IntraMuscular once PRN Glucose LESS THAN 70 milligrams/deciliter    Allergies  latex (Unknown)  No Known Drug Allergies    REVIEW OF SYSTEMS:  CONSTITUTIONAL: +chills, No weakness, fevers  EYES/ENT: No visual changes;  No vertigo or throat pain   NECK: No pain or stiffness  RESPIRATORY: +sob, + cough, no wheezing, hemoptysis  CARDIOVASCULAR: No chest pain or palpitations  GASTROINTESTINAL: No abdominal pain. No nausea, vomiting, or hematemesis; No diarrhea or constipation. No melena or hematochezia.  GENITOURINARY: No dysuria, frequency or hematuria  NEUROLOGICAL: No numbness or weakness  SKIN: No itching or rash  All other review of systems is negative unless indicated above    VITAL SIGNS:   Vital Signs Last 24 Hrs  T(C): 37 (01 May 2018 07:27), Max: 37 (01 May 2018 03:23)  T(F): 98.6 (01 May 2018 07:27), Max: 98.6 (01 May 2018 03:23)  HR: 99 (01 May 2018 07:27) (99 - 108)  BP: 152/72 (01 May 2018 07:27) (148/60 - 170/77)  RR: 20 (01 May 2018 07:27) (20 - 36)  SpO2: 99% (01 May 2018 07:27) (94% - 99%)    I&O's Summary    2018 07:01  -  01 May 2018 07:00  --------------------------------------------------------  IN: 1000 mL / OUT: 0 mL / NET: 1000 mL    On Exam:  Constitutional: NAD, alert and oriented x 3  Lungs:  Non-labored, breath sounds are clear bilaterally, No wheezing, rales or rhonchi  Cardiovascular: tachycardic,  S1 and S2 positive.  No murmurs, rubs, gallops or clicks  Gastrointestinal: soft, nontender, nondistended, no guarding, no rebound  Lymph: 1+ pitting edema b/l lower ext  Neurological: Alert, no focal deficits  Skin: No rashes or ulcers   Psych:  Mood & affect appropriate.    LABS: All Labs Reviewed:                        9.9    10.8  )-----------( 420      ( 01 May 2018 03:45 )             28.2     01 May 2018 03:45    139    |  107    |  54     ----------------------------<  266    4.1     |  22     |  4.10     Ca    8.8        01 May 2018 03:45    TPro  7.3    /  Alb  3.1    /  TBili  0.3    /  DBili  x      /  AST  18     /  ALT  20     /  AlkPhos  75     01 May 2018 03:45      Blood Culture:    @ 03:45  Pro Bnp [id number]    RADIOLOGY:  < from: CT Chest No Cont (18 @ 04:39) >  Small to moderate bilateral pleural effusions (right larger than the   left) with associated compressive atelectasis. Mild diffuse interstitial   pulmonary edema.    EKG: sinus tachycardia,

## 2018-05-01 NOTE — PROGRESS NOTE ADULT - PROBLEM SELECTOR PLAN 2
-Acute. No prior history of HF, elevated pro BNP (33,668).   - f/u TTE  - Cardio ([facility]) consulted.  -Patient follows Dr. Flo Rios (cardio). Call about cardiac history -DAVIS on CKD  -continue NSS @ 60 cc/hr  - HOLD NSAIDs, ACE, ARBs   - trend BUN/Cr  - Nephro (Dr. Reina) consulted. -DAVIS on CKD  -pt is for HD tomorrow. will need PermCath   - HOLD NSAIDs, ACE, ARBs   - trend BUN/Cr  - f/u Nephro Dr. Reina

## 2018-05-01 NOTE — CONSULT NOTE ADULT - ASSESSMENT
66 y/o  female PMHx Type 2 Diabetes on lantus, HTN, CAD s/p stent (2007), Depression, Anxiety, former smoker presenting with sob.     - Likely multifactorial 2/2 to acute CHF vs. COPD exacerbation vs. PNA  - Pro-BNP elevated. Would diurese patient with IV Lasix but pt currently has acute renal failure. Unknown baseline Cr.   - Nephro, Dr. Perez consulted. Appreciate recs.   - Continue supplemental oxygen as needed  - Pulm following. Appreciate recs.   - Check ECHO  - BP elevated in ED. Now improving. Continue amlodipine 2.5 mg qhs, amlodipine 5mg qd, metoprolol 50mg qd  - Monitor and replete lytes, keep K>4, Mg>2  - Other cardiovascular workup will depend on clinical course.  - All other workup per primary team  - Will follow 68 y/o  female PMHx Type 2 Diabetes on lantus, HTN, CAD s/p stent (2007), Depression, Anxiety, former smoker presenting with sob.     - Likely multifactorial 2/2 to acute CHF vs. COPD exacerbation vs. PNA  - Pro-BNP elevated. Would diurese patient with IV Lasix but pt currently has acute renal failure. Unknown baseline Cr.   - Nephro, Dr. Perez consulted. Appreciate recs.   - Continue supplemental oxygen as needed  - Pulm following. Appreciate recs.   - Check ECHO  - BP elevated in ED. Now improving. Continue amlodipine 2.5 mg qhs, amlodipine 5mg qd, metoprolol 50mg qd  - Continue asa 81mg qd  - Monitor and replete lytes, keep K>4, Mg>2  - Other cardiovascular workup will depend on clinical course.  - All other workup per primary team  - Will follow 68 y/o  female PMHx Type 2 Diabetes on lantus, HTN, CAD s/p stent (2007), Depression, Anxiety, former smoker presenting with sob.     - Likely multifactorial 2/2 to acute CHF vs. COPD exacerbation vs. PNA  - Pro-BNP elevated. Would diurese patient with IV Lasix but pt currently has acute renal failure. Unknown baseline Cr.   - Nephro, Dr. Perez consulted. Appreciate recs.   - Continue supplemental oxygen as needed  - Pulm following. Appreciate recs.   - Check ECHO  - BP elevated in ED. Now improving. Continue amlodipine 2.5 mg qhs, amlodipine 5mg qd, metoprolol 50mg qd  - Continue asa 81mg qd  - Monitor and replete lytes, keep K>4, Mg>2  - Monitor I's & O's  - Other cardiovascular workup will depend on clinical course.  - All other workup per primary team  - Will follow 66 y/o  female PMHx Type 2 Diabetes on lantus, HTN, CAD s/p stent (2007), Depression, Anxiety, former smoker presenting with sob.     - Likely multifactorial 2/2 to acute CHF vs. COPD exacerbation vs. PNA  - Pro-BNP elevated. Recommend giving Lasix 60mg IV x1. Would start Lasix 40mg IV BID daily and monitor for clinical improvement.   - Nephkrystle, Dr. Perez following. Patient with CKD Stage 5. Monitor Cr.   - Continue supplemental oxygen as needed  - Pulm following. Appreciate recs.   - Check ECHO  - BP elevated in ED. Now improving. Continue amlodipine 2.5 mg qhs, amlodipine 5mg qd, metoprolol 50mg qd  - Continue asa 81mg qd  - Monitor and replete lytes, keep K>4, Mg>2  - Monitor I's & O's  - Other cardiovascular workup will depend on clinical course.  - All other workup per primary team  - Will follow 66 y/o  female PMHx Type 2 Diabetes on lantus, HTN, CAD s/p stent (2007), Depression, Anxiety, former smoker presenting with sob.     - Likely multifactorial 2/2 to acute CHF vs. COPD exacerbation vs. PNA  - Pro-BNP elevated. Recommend giving Lasix 60mg IV x1. Would start Lasix 40mg IV BID daily and monitor for clinical improvement.   - Nephkrystle, Dr. Perez following. Patient with CKD Stage 5. Monitor Cr.   - Continue supplemental oxygen as needed  - Pulm following. Appreciate recs.   - Check ECHO  - BP elevated in ED. Now improving. Continue amlodipine 2.5 mg qhs, amlodipine 5mg qd, metoprolol 50mg qd  - Continue asa 81mg qd  - Monitor and replete lytes, keep K>4, Mg>2  - Monitor I's & O's  - Fluid restriction  - Other cardiovascular workup will depend on clinical course.  - All other workup per primary team  - Will follow 68 y/o  female PMHx Type 2 Diabetes on lantus, HTN, CAD s/p stent (2007), Depression, Anxiety, CKD, former smoker presenting with sob.     - Likely multifactorial 2/2 to acute CHF vs. COPD exacerbation vs. PNA  - Pro-BNP elevated. Recommend giving Lasix 60mg IV x1. Would start Lasix 40mg IV BID daily and monitor for clinical improvement.   - Nephkrystle, Dr. Perez following. Patient with CKD Stage 5. Monitor Cr.   - Please continue to maintain strict I/Os, monitor daily weights, Cr, and K.   - Continue supplemental oxygen as needed  - Pulm following. Appreciate recs.   - Check ECHO  - BP elevated in ED. Now improving. Continue amlodipine 2.5 mg qhs, amlodipine 5mg qd, Toprol 50mg qd  - Continue asa 81mg qd  - Fluid restriction  - Other cardiovascular workup will depend on clinical course.  - All other workup per primary team  - Will follow

## 2018-05-01 NOTE — CONSULT NOTE ADULT - SUBJECTIVE AND OBJECTIVE BOX
Patient is a 67y Female whom presented to the hospital with     PAST MEDICAL & SURGICAL HISTORY:  h/o Smoking: quitted 3/2012  Murmur, cardiac  PAD (peripheral artery disease)  h/o Hepatitis A 1969: currently resolved, no symptoms  CAD (coronary artery disease)  h/o Myocardial infarct 2007  Depression  h/o Anxiety attack  HTN (hypertension)  Diabetes mellitus II  s/p surgical removal of benign Skin lesion epigastric area  s/p Ovarian cyst removal  coronary stent 2007      MEDICATIONS  (STANDING):  amLODIPine   Tablet 2.5 milliGRAM(s) Oral at bedtime  amLODIPine   Tablet 5 milliGRAM(s) Oral daily  aspirin enteric coated 81 milliGRAM(s) Oral daily  dextrose 5%. 1000 milliLiter(s) (50 mL/Hr) IV Continuous <Continuous>  dextrose 50% Injectable 12.5 Gram(s) IV Push once  dextrose 50% Injectable 25 Gram(s) IV Push once  dextrose 50% Injectable 25 Gram(s) IV Push once  guaiFENesin   Syrup  (Sugar-Free) 100 milliGRAM(s) Oral every 6 hours  heparin  Injectable 5000 Unit(s) SubCutaneous every 12 hours  imipramine 50 milliGRAM(s) Oral daily  insulin glargine Injectable (LANTUS) 7 Unit(s) SubCutaneous at bedtime  insulin lispro (HumaLOG) corrective regimen sliding scale   SubCutaneous three times a day before meals  metoprolol succinate ER 50 milliGRAM(s) Oral daily  sodium chloride 0.9%. 1000 milliLiter(s) (60 mL/Hr) IV Continuous <Continuous>  tiotropium 18 MICROgram(s) Capsule 1 Capsule(s) Inhalation daily      Allergies    latex (Unknown)  No Known Drug Allergies    Intolerances        SOCIAL HISTORY:  Denies ETOh,Smoking,     FAMILY HISTORY:  No pertinent family history in first degree relatives      REVIEW OF SYSTEMS:    CONSTITUTIONAL: No weakness, fevers or chills  EYES/ENT: No visual changes;  no throat pain   NECK: No pain or stiffness  RESPIRATORY: No cough, wheezing, hemoptysis; No shortness of breath  CARDIOVASCULAR: No chest pain or palpitations  GASTROINTESTINAL: No abdominal or epigastric pain. No nausea, vomiting,     No diarrhea or constipation. No melena   GENITOURINARY: No dysuria, frequency or hematuria  NEUROLOGICAL: No numbness or weakness  SKIN: dry      VITAL:  T(C): , Max: 37 (05-01-18 @ 03:23)  T(F): , Max: 98.6 (05-01-18 @ 03:23)  HR: 102 (05-01-18 @ 09:02)  BP: 160/87 (05-01-18 @ 09:02)  BP(mean): --  RR: 20 (05-01-18 @ 09:02)  SpO2: 95% (05-01-18 @ 09:02)  Wt(kg): --    I and O's:    04-30 @ 07:01  -  05-01 @ 07:00  --------------------------------------------------------  IN: 1000 mL / OUT: 0 mL / NET: 1000 mL      Height (cm): 177.8 (05-01 @ 06:15)  Weight (kg): 69.4 (05-01 @ 06:15)  BMI (kg/m2): 22 (05-01 @ 06:15)  BSA (m2): 1.86 (05-01 @ 06:15)    PHYSICAL EXAM:    Constitutional: NAD  HEENT: conjunctive   clear   Neck:  No JVD  Respiratory: CTAB  Cardiovascular: S1 and S2  Gastrointestinal: BS+, soft, NT/ND  Extremities: No peripheral edema  Neurological: A/O x 3, no focal deficits  Psychiatric: Normal mood, normal affect  : No Renteria  Skin: No rashes  Access: Not applicable    LABS:                        9.9    10.8  )-----------( 420      ( 01 May 2018 03:45 )             28.2     05-01    139  |  107  |  54<H>  ----------------------------<  266<H>  4.1   |  22  |  4.10<H>    Ca    8.8      01 May 2018 03:45    TPro  7.3  /  Alb  3.1<L>  /  TBili  0.3  /  DBili  x   /  AST  18  /  ALT  20  /  AlkPhos  75  05-01      Urine Studies:          RADIOLOGY & ADDITIONAL STUDIES: Patient is a 67y Female whom presented to the hospital with esrd required hd     PAST MEDICAL & SURGICAL HISTORY:  h/o Smoking: quitted 3/2012  Murmur, cardiac  PAD (peripheral artery disease)  h/o Hepatitis A 1969: currently resolved, no symptoms  CAD (coronary artery disease)  h/o Myocardial infarct 2007  Depression  h/o Anxiety attack  HTN (hypertension)  Diabetes mellitus II  s/p surgical removal of benign Skin lesion epigastric area  s/p Ovarian cyst removal  coronary stent 2007      MEDICATIONS  (STANDING):  amLODIPine   Tablet 2.5 milliGRAM(s) Oral at bedtime  amLODIPine   Tablet 5 milliGRAM(s) Oral daily  aspirin enteric coated 81 milliGRAM(s) Oral daily  dextrose 5%. 1000 milliLiter(s) (50 mL/Hr) IV Continuous <Continuous>  dextrose 50% Injectable 12.5 Gram(s) IV Push once  dextrose 50% Injectable 25 Gram(s) IV Push once  dextrose 50% Injectable 25 Gram(s) IV Push once  guaiFENesin   Syrup  (Sugar-Free) 100 milliGRAM(s) Oral every 6 hours  heparin  Injectable 5000 Unit(s) SubCutaneous every 12 hours  imipramine 50 milliGRAM(s) Oral daily  insulin glargine Injectable (LANTUS) 7 Unit(s) SubCutaneous at bedtime  insulin lispro (HumaLOG) corrective regimen sliding scale   SubCutaneous three times a day before meals  metoprolol succinate ER 50 milliGRAM(s) Oral daily  sodium chloride 0.9%. 1000 milliLiter(s) (60 mL/Hr) IV Continuous <Continuous>  tiotropium 18 MICROgram(s) Capsule 1 Capsule(s) Inhalation daily      Allergies    latex (Unknown)  No Known Drug Allergies    Intolerances        SOCIAL HISTORY:  Denies ETOh,Smoking,     FAMILY HISTORY:  No pertinent family history in first degree relatives      REVIEW OF SYSTEMS:    CONSTITUTIONAL: No weakness, fevers or chills  EYES/ENT: No visual changes;  no throat pain   NECK: No pain or stiffness  RESPIRATORY: No cough, wheezing, hemoptysis; pos  shortness of breath  CARDIOVASCULAR: No chest pain or palpitations  GASTROINTESTINAL: No abdominal or epigastric pain. No nausea, vomiting,     No diarrhea or constipation. No melena   GENITOURINARY: No dysuria, frequency or hematuria  NEUROLOGICAL: No numbness or weakness  SKIN: dry      VITAL:  T(C): , Max: 37 (05-01-18 @ 03:23)  T(F): , Max: 98.6 (05-01-18 @ 03:23)  HR: 102 (05-01-18 @ 09:02)  BP: 160/87 (05-01-18 @ 09:02)  BP(mean): --  RR: 20 (05-01-18 @ 09:02)  SpO2: 95% (05-01-18 @ 09:02)  Wt(kg): --    I and O's:    04-30 @ 07:01  -  05-01 @ 07:00  --------------------------------------------------------  IN: 1000 mL / OUT: 0 mL / NET: 1000 mL      Height (cm): 177.8 (05-01 @ 06:15)  Weight (kg): 69.4 (05-01 @ 06:15)  BMI (kg/m2): 22 (05-01 @ 06:15)  BSA (m2): 1.86 (05-01 @ 06:15)    PHYSICAL EXAM:    Constitutional: NAD  HEENT: conjunctive   clear   Neck:  No JVD  Respiratory: CTAB  Cardiovascular: S1 and S2  Gastrointestinal: BS+, soft, NT/ND  Extremities: pos  peripheral edema  Neurological: A/O x 3, no focal deficits  Psychiatric: Normal mood, normal affect  : No Renteria  Skin: dry       LABS:                        9.9    10.8  )-----------( 420      ( 01 May 2018 03:45 )             28.2     05-01    139  |  107  |  54<H>  ----------------------------<  266<H>  4.1   |  22  |  4.10<H>    Ca    8.8      01 May 2018 03:45    TPro  7.3  /  Alb  3.1<L>  /  TBili  0.3  /  DBili  x   /  AST  18  /  ALT  20  /  AlkPhos  75  05-01      Urine Studies:          RADIOLOGY & ADDITIONAL STUDIES:

## 2018-05-01 NOTE — ED ADULT TRIAGE NOTE - CHIEF COMPLAINT QUOTE
patient c/o SOB x a few days, woke up tonight unable to catch her breath. Patient tachypnec with O2 sat of 81% at home, now 100%

## 2018-05-01 NOTE — H&P ADULT - NSHPPHYSICALEXAM_GEN_ALL_CORE
Physical Exam:  General: Moderate respiratory distress  HEENT: NCAT, PERRLA, EOMI bl, moist mucous membranes   Neck: Supple, nontender, no mass  Neurology: A&Ox3, nonfocal, CN II-XII grossly intact, sensation intact, no gait abnormalities   Respiratory: diffuse wheezing throughout lung fields,   CV: RRR, +S1/S2, no murmurs, rubs or gallops  Abdominal: Soft, NT, ND +BSx4  Extremities: No C/C/E, + peripheral pulses  MSK: Normal ROM, no joint erythema or warmth, no joint swelling   Skin: warm, dry, normal color, no rash or abnormal lesions Physical Exam:  General: Elderly  female with Moderate respiratory distress  HEENT: NCAT, PERRLA, EOMI bl, moist mucous membranes   Neck: Supple, nontender, no mass  Neurology: A&Ox3, nonfocal, CN II-XII grossly intact, sensation intact, no gait abnormalities   Respiratory: decreased breath sound bilaterally, mild wheezing in upper lung fields,   CV: RRR, +S1/S2, no murmurs, rubs or gallops  Abdominal: Soft, NT, ND +BSx4  Extremities: No C/C/E, + peripheral pulses  MSK: Normal ROM, no joint erythema or warmth, no joint swelling   Skin: warm, dry, normal color, no rash or abnormal lesions

## 2018-05-01 NOTE — PROGRESS NOTE ADULT - SUBJECTIVE AND OBJECTIVE BOX
Patient is a 68 y/o  female PMHx Type 2 Diabetes on lantus, HTN, CAD s/p stent (2007), Depression, Anxiety, former smoker brought in by EMS for shortness of breath. The sob has progressed over 1 week. The sob is associated with a productive cough with clear sputum. The sob/cough are worse with activity and relieved with rest. She admits to swelling in the feet that has increased this week. She denies recent travel or sick contacts. She received a flu vaccine this year. She denies fever, chills, cp, palpitations, N/V/C/D, abdominal pain.     In ED afebrile, tachycardic, hypertensive, SpO2 81% on RA, 94% on 2LNC. Labs significant for a WBC 10.8, BUN 54, Cr 4.1, Glucose 266, Lactate 0.9. CT chest small to moderate b/l pleural effusions, right > left. Meds: Duonebs, Solumedrol    INTERVAL HPI/OVERNIGHT EVENTS: Patient seen and examined at bedside. Patient states she has been shoort of breath for the past week but denies any chest pain,. She had an EKG last week, done by her cardiologist Dr. Flo Rios and it was normal. She had an echo in the past, she is not aware of the results. She follows Dr. Perez for her kidney disease.     Home Medications:  amLODIPine 2.5 mg oral tablet: 1 tab(s) orally once a day (at bedtime) (01 May 2018 06:44)  amLODIPine 5 mg oral tablet: 1 tab(s) orally once a day (01 May 2018 06:44)  Aspirin Enteric Coated 81 mg oral delayed release tablet: 1 tab(s) orally once a day (01 May 2018 06:44)  imipramine 50 mg oral tablet: 1 tab(s) orally once a day (01 May 2018 06:44)  Lantus 100 units/mL subcutaneous solution: 14 unit(s) subcutaneous once (at bedtime) (01 May 2018 06:44)  Metoprolol Succinate ER 50 mg oral tablet, extended release: 1 tab(s) orally once a day (01 May 2018 06:44)  Trulicity Pen 0.75 mg/0.5 mL subcutaneous solution: subcutaneous once a week (01 May 2018 06:44)    PAST MEDICAL & SURGICAL HISTORY:  h/o Smoking: quitted 3/2012, 50 pack year  Murmur, cardiac  PAD (peripheral artery disease)  h/o Hepatitis A 1969: currently resolved, no symptoms  CAD (coronary artery disease)  h/o Myocardial infarct 2007  Depression  h/o Anxiety attack  HTN (hypertension)  Diabetes mellitus II  s/p surgical removal of benign Skin lesion epigastric area  s/p Ovarian cyst removal  coronary stent 2007    Social History: Former smoker, quit 1 year ago. + Flu vaccine. No pneumo vaccine    MEDICATIONS  (STANDING):  ALBUTerol/ipratropium for Nebulization 3 milliLiter(s) Nebulizer every 6 hours  amLODIPine   Tablet 2.5 milliGRAM(s) Oral at bedtime  amLODIPine   Tablet 5 milliGRAM(s) Oral daily  aspirin enteric coated 81 milliGRAM(s) Oral daily  dextrose 5%. 1000 milliLiter(s) (50 mL/Hr) IV Continuous <Continuous>  dextrose 50% Injectable 12.5 Gram(s) IV Push once  dextrose 50% Injectable 25 Gram(s) IV Push once  dextrose 50% Injectable 25 Gram(s) IV Push once  guaiFENesin   Syrup  (Sugar-Free) 100 milliGRAM(s) Oral every 6 hours  heparin  Injectable 5000 Unit(s) SubCutaneous every 12 hours  imipramine 50 milliGRAM(s) Oral daily  insulin glargine Injectable (LANTUS) 7 Unit(s) SubCutaneous at bedtime  insulin lispro (HumaLOG) corrective regimen sliding scale   SubCutaneous three times a day before meals  methylPREDNISolone sodium succinate Injectable 40 milliGRAM(s) IV Push every 8 hours  metoprolol succinate ER 50 milliGRAM(s) Oral daily  sodium chloride 0.9%. 1000 milliLiter(s) (60 mL/Hr) IV Continuous <Continuous>    MEDICATIONS  (PRN):  dextrose Gel 1 Dose(s) Oral once PRN Blood Glucose LESS THAN 70 milliGRAM(s)/deciliter  glucagon  Injectable 1 milliGRAM(s) IntraMuscular once PRN Glucose LESS THAN 70 milligrams/deciliter      Allergies    latex (Unknown)  No Known Drug Allergies    Intolerances    Review of Systems:   Constitutional: (-) denies fever, chills, diaphoresis   HEENT: denies blurry vision, difficulty hearing  Respiratory: (+) admits SOB, ADAMSON, cough, sputum production  Cardiovascular: (-) denies CP, palpitations, (+) edema  Gastrointestinal: denies nausea, vomiting, diarrhea, constipation, abdominal pain, melena, hematochezia   Genitourinary: denies dysuria, frequency, urgency, hematuria   Skin/Breast: denies rash, itching  Musculoskeletal: denies myalgias, joint swelling, muscle weakness  Neurologic: denies headache, weakness, dizziness, paresthesias, numbness/tingling  Psychiatric: denies feeling anxious, depressed, suicidal, homicidal thoughts  Hematology/Oncology: denies bruising, tender or enlarged lymph nodes   ROS negative except as noted above    Vital Signs Last 24 Hrs  T(C): 36.9 (01 May 2018 05:25), Max: 37 (01 May 2018 03:23)  T(F): 98.4 (01 May 2018 05:25), Max: 98.6 (01 May 2018 03:23)  HR: 102 (01 May 2018 05:25) (102 - 108)  BP: 148/60 (01 May 2018 05:25) (148/60 - 170/77)  RR: 23 (01 May 2018 05:25) (23 - 36)  SpO2: 95% (01 May 2018 05:25) (94% - 95%)    04-30 @ 07:01  -  05-01 @ 07:00  --------------------------------------------------------  IN: 1000 mL / OUT: 0 mL / NET: 1000 mL    Physical Exam  General: Elderly  female in no acute respiratory distress  HEENT: NCAT, PERRLA, EOMI bl, moist mucous membranes   Neck: Supple, nontender, no mass  Neurology: A&Ox3, nonfocal, CN II-XII grossly intact, sensation intact, no gait abnormalities   Respiratory: decreased breath sound bilaterally, mild wheezing in upper lung fields,   CV: RRR, +S1/S2, no murmurs, rubs or gallops  Abdominal: Soft, NT, ND +BSx4  Extremities: No C/C/E, + peripheral pulses  MSK: Normal ROM, no joint erythema or warmth, no joint swelling   Skin: warm, dry, normal color, no rash or abnormal lesion      LABS:                        9.9    10.8  )-----------( 420      ( 01 May 2018 03:45 )             28.2     05-01    139  |  107  |  54<H>  ----------------------------<  266<H>  4.1   |  22  |  4.10<H>    Ca    8.8      01 May 2018 03:45    TPro  7.3  /  Alb  3.1<L>  /  TBili  0.3  /  DBili  x   /  AST  18  /  ALT  20  /  AlkPhos  75  05-01      < from: CT Chest No Cont (05.01.18 @ 04:39) >  Small to moderate bilateral pleural effusions (right larger than the   left) with associated compressive atelectasis. Mild diffuse interstitial   pulmonary edema. INTERVAL HPI/OVERNIGHT EVENTS: Patient seen and examined at bedside. Patient states she has been shoort of breath for the past week but denies any chest pain,. She had an EKG last week, done by her cardiologist Dr. Flo Rios and it was normal. She had an echo in the past, she is not aware of the results. She follows Dr. Perez for her kidney disease.       MEDICATIONS  (STANDING):  ALBUTerol/ipratropium for Nebulization 3 milliLiter(s) Nebulizer every 6 hours  amLODIPine   Tablet 2.5 milliGRAM(s) Oral at bedtime  amLODIPine   Tablet 5 milliGRAM(s) Oral daily  aspirin enteric coated 81 milliGRAM(s) Oral daily  dextrose 5%. 1000 milliLiter(s) (50 mL/Hr) IV Continuous <Continuous>  dextrose 50% Injectable 12.5 Gram(s) IV Push once  dextrose 50% Injectable 25 Gram(s) IV Push once  dextrose 50% Injectable 25 Gram(s) IV Push once  guaiFENesin   Syrup  (Sugar-Free) 100 milliGRAM(s) Oral every 6 hours  heparin  Injectable 5000 Unit(s) SubCutaneous every 12 hours  imipramine 50 milliGRAM(s) Oral daily  insulin glargine Injectable (LANTUS) 7 Unit(s) SubCutaneous at bedtime  insulin lispro (HumaLOG) corrective regimen sliding scale   SubCutaneous three times a day before meals  methylPREDNISolone sodium succinate Injectable 40 milliGRAM(s) IV Push every 8 hours  metoprolol succinate ER 50 milliGRAM(s) Oral daily  sodium chloride 0.9%. 1000 milliLiter(s) (60 mL/Hr) IV Continuous <Continuous>    MEDICATIONS  (PRN):  dextrose Gel 1 Dose(s) Oral once PRN Blood Glucose LESS THAN 70 milliGRAM(s)/deciliter  glucagon  Injectable 1 milliGRAM(s) IntraMuscular once PRN Glucose LESS THAN 70 milligrams/deciliter      Allergies    latex (Unknown)  No Known Drug Allergies    Intolerances    Review of Systems:   Constitutional: (-) denies fever, chills, diaphoresis   HEENT: denies blurry vision, difficulty hearing  Respiratory: (+) admits SOB, ADAMSON, cough, sputum production  Cardiovascular: (-) denies CP, palpitations, (+) edema  Gastrointestinal: denies nausea, vomiting, diarrhea, constipation, abdominal pain, melena, hematochezia   Genitourinary: denies dysuria, frequency, urgency, hematuria   Skin/Breast: denies rash, itching  Musculoskeletal: denies myalgias, joint swelling, muscle weakness  Neurologic: denies headache, weakness, dizziness, paresthesias, numbness/tingling  Psychiatric: denies feeling anxious, depressed, suicidal, homicidal thoughts  Hematology/Oncology: denies bruising, tender or enlarged lymph nodes   ROS negative except as noted above    Vital Signs Last 24 Hrs  T(C): 36.9 (01 May 2018 05:25), Max: 37 (01 May 2018 03:23)  T(F): 98.4 (01 May 2018 05:25), Max: 98.6 (01 May 2018 03:23)  HR: 102 (01 May 2018 05:25) (102 - 108)  BP: 148/60 (01 May 2018 05:25) (148/60 - 170/77)  RR: 23 (01 May 2018 05:25) (23 - 36)  SpO2: 95% (01 May 2018 05:25) (94% - 95%)    04-30 @ 07:01  -  05-01 @ 07:00  --------------------------------------------------------  IN: 1000 mL / OUT: 0 mL / NET: 1000 mL    Physical Exam  General: Elderly  female in no acute respiratory distress  HEENT: NCAT, PERRLA, EOMI bl, moist mucous membranes   Neck: Supple, nontender, no mass  Neurology: A&Ox3, nonfocal, CN II-XII grossly intact, sensation intact, no gait abnormalities   Respiratory: decreased breath sound bilaterally, no wheezing  CV: RRR, +S1/S2, no murmurs, rubs or gallops  Abdominal: Soft, NT, ND +BSx4  Extremities: No C/C/E, + peripheral pulses  MSK: Normal ROM, no joint erythema or warmth, no joint swelling   Skin: warm, dry, normal color, no rash or abnormal lesion      LABS:                        9.9    10.8  )-----------( 420      ( 01 May 2018 03:45 )             28.2     05-01    139  |  107  |  54<H>  ----------------------------<  266<H>  4.1   |  22  |  4.10<H>    Ca    8.8      01 May 2018 03:45    TPro  7.3  /  Alb  3.1<L>  /  TBili  0.3  /  DBili  x   /  AST  18  /  ALT  20  /  AlkPhos  75  05-01      < from: CT Chest No Cont (05.01.18 @ 04:39) >  Small to moderate bilateral pleural effusions (right larger than the   left) with associated compressive atelectasis. Mild diffuse interstitial   pulmonary edema.

## 2018-05-01 NOTE — H&P ADULT - ATTENDING COMMENTS
Unclear if respiratory distress is from new COPD or new CHF.  Unclear if renal failure is new, pt doesn't know recent lab results.    Consulted Pulmonary, Cardio, and Nephro.  Pt seems to have improved after lasix and duonebs.  Lungs have mild expiratory wheezing in the bases, but reduced breath sounds.  Needs to be monitored on tele with continous pulse ox.

## 2018-05-02 DIAGNOSIS — D72.829 ELEVATED WHITE BLOOD CELL COUNT, UNSPECIFIED: ICD-10-CM

## 2018-05-02 LAB
ANION GAP SERPL CALC-SCNC: 16 MMOL/L — SIGNIFICANT CHANGE UP (ref 5–17)
APPEARANCE UR: CLEAR — SIGNIFICANT CHANGE UP
BILIRUB UR-MCNC: NEGATIVE — SIGNIFICANT CHANGE UP
BUN SERPL-MCNC: 69 MG/DL — HIGH (ref 7–23)
CALCIUM SERPL-MCNC: 8.8 MG/DL — SIGNIFICANT CHANGE UP (ref 8.5–10.1)
CHLORIDE SERPL-SCNC: 100 MMOL/L — SIGNIFICANT CHANGE UP (ref 96–108)
CO2 SERPL-SCNC: 20 MMOL/L — LOW (ref 22–31)
COLOR SPEC: YELLOW — SIGNIFICANT CHANGE UP
CREAT SERPL-MCNC: 4.5 MG/DL — HIGH (ref 0.5–1.3)
DIFF PNL FLD: ABNORMAL
GLUCOSE SERPL-MCNC: 265 MG/DL — HIGH (ref 70–99)
GLUCOSE UR QL: 100 MG/DL
HAV IGM SER-ACNC: SIGNIFICANT CHANGE UP
HBA1C BLD-MCNC: 8.5 % — HIGH (ref 4–5.6)
HBV CORE AB SER-ACNC: REACTIVE
HBV CORE IGM SER-ACNC: SIGNIFICANT CHANGE UP
HBV DNA # SERPL NAA+PROBE: SIGNIFICANT CHANGE UP
HBV DNA SERPL NAA+PROBE-LOG#: SIGNIFICANT CHANGE UP LOGIU/ML
HBV SURFACE AB SER-ACNC: 96.2 MIU/ML — SIGNIFICANT CHANGE UP
HBV SURFACE AG SER-ACNC: SIGNIFICANT CHANGE UP
HCT VFR BLD CALC: 28.4 % — LOW (ref 34.5–45)
HCT VFR BLD CALC: 28.6 % — LOW (ref 34.5–45)
HCV AB S/CO SERPL IA: 0.1 S/CO — SIGNIFICANT CHANGE UP
HCV AB SERPL-IMP: SIGNIFICANT CHANGE UP
HGB BLD-MCNC: 10 G/DL — LOW (ref 11.5–15.5)
HGB BLD-MCNC: 9.7 G/DL — LOW (ref 11.5–15.5)
KETONES UR-MCNC: NEGATIVE — SIGNIFICANT CHANGE UP
LEUKOCYTE ESTERASE UR-ACNC: NEGATIVE — SIGNIFICANT CHANGE UP
LYMPHOCYTES # BLD AUTO: 7 % — LOW (ref 13–44)
MCHC RBC-ENTMCNC: 29.1 PG — SIGNIFICANT CHANGE UP (ref 27–34)
MCHC RBC-ENTMCNC: 29.8 PG — SIGNIFICANT CHANGE UP (ref 27–34)
MCHC RBC-ENTMCNC: 34.2 GM/DL — SIGNIFICANT CHANGE UP (ref 32–36)
MCHC RBC-ENTMCNC: 35 GM/DL — SIGNIFICANT CHANGE UP (ref 32–36)
MCV RBC AUTO: 85.2 FL — SIGNIFICANT CHANGE UP (ref 80–100)
MCV RBC AUTO: 85.3 FL — SIGNIFICANT CHANGE UP (ref 80–100)
MONOCYTES NFR BLD AUTO: 6 % — SIGNIFICANT CHANGE UP (ref 1–9)
NEUTROPHILS NFR BLD AUTO: 79 % — HIGH (ref 43–77)
NITRITE UR-MCNC: NEGATIVE — SIGNIFICANT CHANGE UP
PH UR: 5 — SIGNIFICANT CHANGE UP (ref 5–8)
PLATELET # BLD AUTO: 413 K/UL — HIGH (ref 150–400)
PLATELET # BLD AUTO: 480 K/UL — HIGH (ref 150–400)
POTASSIUM SERPL-MCNC: 4.9 MMOL/L — SIGNIFICANT CHANGE UP (ref 3.5–5.3)
POTASSIUM SERPL-SCNC: 4.9 MMOL/L — SIGNIFICANT CHANGE UP (ref 3.5–5.3)
PROT UR-MCNC: 150 MG/DL
RBC # BLD: 3.33 M/UL — LOW (ref 3.8–5.2)
RBC # BLD: 3.36 M/UL — LOW (ref 3.8–5.2)
RBC # FLD: 14.7 % — HIGH (ref 10.3–14.5)
RBC # FLD: 15 % — HIGH (ref 10.3–14.5)
SODIUM SERPL-SCNC: 136 MMOL/L — SIGNIFICANT CHANGE UP (ref 135–145)
SP GR SPEC: 1.01 — SIGNIFICANT CHANGE UP (ref 1.01–1.02)
UROBILINOGEN FLD QL: NEGATIVE — SIGNIFICANT CHANGE UP
WBC # BLD: 21.4 K/UL — HIGH (ref 3.8–10.5)
WBC # BLD: 22.2 K/UL — HIGH (ref 3.8–10.5)
WBC # FLD AUTO: 21.4 K/UL — HIGH (ref 3.8–10.5)
WBC # FLD AUTO: 22.2 K/UL — HIGH (ref 3.8–10.5)

## 2018-05-02 PROCEDURE — 99233 SBSQ HOSP IP/OBS HIGH 50: CPT

## 2018-05-02 PROCEDURE — 99233 SBSQ HOSP IP/OBS HIGH 50: CPT | Mod: GC

## 2018-05-02 PROCEDURE — 71045 X-RAY EXAM CHEST 1 VIEW: CPT | Mod: 26

## 2018-05-02 RX ORDER — INSULIN GLARGINE 100 [IU]/ML
16 INJECTION, SOLUTION SUBCUTANEOUS AT BEDTIME
Qty: 0 | Refills: 0 | Status: DISCONTINUED | OUTPATIENT
Start: 2018-05-02 | End: 2018-05-03

## 2018-05-02 RX ORDER — INSULIN LISPRO 100/ML
VIAL (ML) SUBCUTANEOUS
Qty: 0 | Refills: 0 | Status: DISCONTINUED | OUTPATIENT
Start: 2018-05-02 | End: 2018-05-04

## 2018-05-02 RX ORDER — ACETAMINOPHEN 500 MG
650 TABLET ORAL EVERY 6 HOURS
Qty: 0 | Refills: 0 | Status: DISCONTINUED | OUTPATIENT
Start: 2018-05-02 | End: 2018-05-04

## 2018-05-02 RX ORDER — INSULIN LISPRO 100/ML
VIAL (ML) SUBCUTANEOUS AT BEDTIME
Qty: 0 | Refills: 0 | Status: DISCONTINUED | OUTPATIENT
Start: 2018-05-02 | End: 2018-05-04

## 2018-05-02 RX ADMIN — TIOTROPIUM BROMIDE 1 CAPSULE(S): 18 CAPSULE ORAL; RESPIRATORY (INHALATION) at 05:33

## 2018-05-02 RX ADMIN — Medication 50 MILLIGRAM(S): at 05:32

## 2018-05-02 RX ADMIN — Medication 6: at 12:39

## 2018-05-02 RX ADMIN — HEPARIN SODIUM 5000 UNIT(S): 5000 INJECTION INTRAVENOUS; SUBCUTANEOUS at 19:04

## 2018-05-02 RX ADMIN — Medication 650 MILLIGRAM(S): at 21:30

## 2018-05-02 RX ADMIN — Medication 40 MILLIGRAM(S): at 05:32

## 2018-05-02 RX ADMIN — Medication 2: at 21:43

## 2018-05-02 RX ADMIN — Medication 650 MILLIGRAM(S): at 20:15

## 2018-05-02 RX ADMIN — Medication 50 MILLIGRAM(S): at 11:16

## 2018-05-02 RX ADMIN — Medication 3: at 07:39

## 2018-05-02 RX ADMIN — Medication 4: at 17:11

## 2018-05-02 RX ADMIN — AMLODIPINE BESYLATE 2.5 MILLIGRAM(S): 2.5 TABLET ORAL at 21:43

## 2018-05-02 RX ADMIN — INSULIN GLARGINE 16 UNIT(S): 100 INJECTION, SOLUTION SUBCUTANEOUS at 21:43

## 2018-05-02 RX ADMIN — Medication 3 MILLIGRAM(S): at 21:43

## 2018-05-02 RX ADMIN — Medication 40 MILLIGRAM(S): at 19:04

## 2018-05-02 RX ADMIN — AMLODIPINE BESYLATE 5 MILLIGRAM(S): 2.5 TABLET ORAL at 05:32

## 2018-05-02 NOTE — PROGRESS NOTE ADULT - ASSESSMENT
66 y/o  female PMHx Type 2 Diabetes on lantus, HTN, CAD s/p stent (2007), Depression, Anxiety, former smoker brought in by EMS for shortness of breath, admitted with CHF, DAVIS on CKD

## 2018-05-02 NOTE — PROGRESS NOTE ADULT - PROBLEM SELECTOR PLAN 6
-Blood pressure continues to be elevated on amlodipine, lasix, and metoprolol.  -May need to make adjustments to medications -Chronic. On Lantus 14 units qHS at home  - Started on Lantus 14 units qHS, however blood glucose continues to be above 250  -Increase Lantus to 16 units, add moderate dose sliding scale  - Accuchecks, Hypoglycemic protocol   - f/u HgbA1c  - Diet: Consistent carb

## 2018-05-02 NOTE — PROGRESS NOTE ADULT - PROBLEM SELECTOR PLAN 2
ANEMIA PLAN: Anemia of chronic disease:  We will continue Aranesp aiming for a HCT of 32-36 %. We will monitor Iron stores, B12 and RBC folate .

## 2018-05-02 NOTE — PROGRESS NOTE ADULT - PROBLEM SELECTOR PLAN 1
multifactorial etiology  pleural eff, atelectasis, copd, heart failure, ckd  I and O  serial labs  cardio and renal follow up  cont diuresis and cvs regimen and BP control  cont NEBS PRN and Spiriva daily  keep sat > 88 pct  monitor sat on exertion  assess sat off o2   will follow  discussed with pt  doubt she will need thoracentesis, cont medical management with LASIX

## 2018-05-02 NOTE — PROCEDURE NOTE - NSPROCDETAILS_GEN_ALL_CORE
lumen(s) aspirated and flushed/sterile dressing applied/ultrasound guidance/sterile technique, catheter placed/guidewire recovered

## 2018-05-02 NOTE — PROGRESS NOTE ADULT - PROBLEM SELECTOR PLAN 8
DVT ppx - Heparin 5000 subQ q12H  Diet: Consistent Carb   Activity - out of bed to sarabjit  Fall precautions Chronic.   - Continue Imipramine

## 2018-05-02 NOTE — PROGRESS NOTE ADULT - ASSESSMENT
Patient is a 66 y/o  female PMHx Type 2 Diabetes on lantus, HTN, CAD s/p stent (2007), Depression, Anxiety, former smoker brought in by EMS for shortness of breath. pt was seen in my office last week with no complains , try to set up hemodialysis for pt

## 2018-05-02 NOTE — PROGRESS NOTE ADULT - PROBLEM SELECTOR PLAN 1
Excess fluids and waste products will be removed from your blood; your electrolytes will be balanced; your blood pressure will be controlled.  continue  with hemodialysis .

## 2018-05-02 NOTE — PROGRESS NOTE ADULT - ASSESSMENT
68 y/o  female PMHx Type 2 Diabetes on lantus, HTN, CAD s/p stent (2007), Depression, Anxiety, CKD, former smoker presenting with sob.     - Likely multifactorial 2/2 to acute CHF with some component possible of underlying lung disease copd/pna  - cont  Lasix 40mg IV BID and monitor for clinical improvement.   - Nephro, Dr. Perez following. Patient with CKD Stage 5. Monitor Cr closely, which is already bumping somewhat.  She may require more urgent hd if her renal function continues to deteriorate  - Please continue to maintain strict I/Os, monitor daily weights, Cr, and K.   - Continue supplemental oxygen as needed  - echo yesterday preliminarily reveals mild-moderate segmental dysfunction, will review  - BP elevated in ED. Now improving. Continue amlodipine 2.5 mg qhs, amlodipine 5mg qd, Toprol 50mg qd  - Continue asa 81mg qd  - Fluid restriction  - Other cardiovascular workup will depend on clinical course.  - All other workup per primary team  - Will follow

## 2018-05-02 NOTE — PROGRESS NOTE ADULT - SUBJECTIVE AND OBJECTIVE BOX
[facility]    Lorraine Gruber, Laron, Dee, Alli, Joseph, Keith      [phone]    CHIEF COMPLAINT: Patient is a 67y old  Female who presents with a chief complaint of dypsnea (01 May 2018 05:21)      Follow Up: cad, ckd, decompensated hf    Interim history: sob improved, no cp.    MEDICATIONS  (STANDING):  amLODIPine   Tablet 2.5 milliGRAM(s) Oral at bedtime  amLODIPine   Tablet 5 milliGRAM(s) Oral daily  dextrose 5%. 1000 milliLiter(s) (50 mL/Hr) IV Continuous <Continuous>  dextrose 50% Injectable 12.5 Gram(s) IV Push once  dextrose 50% Injectable 25 Gram(s) IV Push once  dextrose 50% Injectable 25 Gram(s) IV Push once  furosemide   Injectable 40 milliGRAM(s) IV Push two times a day  guaiFENesin   Syrup  (Sugar-Free) 100 milliGRAM(s) Oral every 6 hours  heparin  Injectable 5000 Unit(s) SubCutaneous every 12 hours  imipramine 50 milliGRAM(s) Oral daily  insulin glargine Injectable (LANTUS) 14 Unit(s) SubCutaneous at bedtime  insulin lispro (HumaLOG) corrective regimen sliding scale   SubCutaneous three times a day before meals  insulin lispro (HumaLOG) corrective regimen sliding scale   SubCutaneous at bedtime  melatonin 3 milliGRAM(s) Oral at bedtime  metoprolol succinate ER 50 milliGRAM(s) Oral daily  tiotropium 18 MICROgram(s) Capsule 1 Capsule(s) Inhalation daily    MEDICATIONS  (PRN):  ALBUTerol    0.083% 2.5 milliGRAM(s) Nebulizer every 6 hours PRN Shortness of Breath and/or Wheezing  dextrose Gel 1 Dose(s) Oral once PRN Blood Glucose LESS THAN 70 milliGRAM(s)/deciliter  glucagon  Injectable 1 milliGRAM(s) IntraMuscular once PRN Glucose LESS THAN 70 milligrams/deciliter      REVIEW OF SYSTEMS:  eye, ent, GI, , allergic, dermatologic, musculoskeletal and neurologic are negative except as described above    Vital Signs Last 24 Hrs  T(C): 37.1 (02 May 2018 08:05), Max: 37.1 (02 May 2018 08:05)  T(F): 98.7 (02 May 2018 08:05), Max: 98.7 (02 May 2018 08:05)  HR: 93 (02 May 2018 08:05) (77 - 99)  BP: 144/78 (02 May 2018 08:05) (142/80 - 172/85)  BP(mean): --  RR: 20 (02 May 2018 08:05) (18 - 20)  SpO2: 93% (02 May 2018 08:05) (93% - 99%)    I&O's Summary    01 May 2018 07:01  -  02 May 2018 07:00  --------------------------------------------------------  IN: 660 mL / OUT: 350 mL / NET: 310 mL        Telemetry past 24h: sr    PHYSICAL EXAM:    Constitutional: well-nourished, well-developed, NAD   HEENT:  MMM, sclerae anicteric, conjunctivae clear, no oral cyanosis.  Pulmonary: Non-labored, breath sounds are decr at bases bilaterally   Cardiovascular: Regular, S1 and S2.  No murmur.  No rubs, gallops or clicks  Gastrointestinal: Bowel Sounds present, soft, nontender.   Lymph: 1+ peripheral edema.   Neurological: Alert, no focal deficits  Skin: No rashes.  Psych:  Mood & affect appropriate    LABS: All Labs Reviewed:                        9.7    22.2  )-----------( 480      ( 02 May 2018 06:37 )             28.4                         9.9    10.8  )-----------( 420      ( 01 May 2018 03:45 )             28.2     02 May 2018 06:37    136    |  100    |  69     ----------------------------<  265    4.9     |  20     |  4.50   01 May 2018 03:45    139    |  107    |  54     ----------------------------<  266    4.1     |  22     |  4.10     Ca    8.8        02 May 2018 06:37  Ca    8.8        01 May 2018 03:45    TPro  7.3    /  Alb  3.1    /  TBili  0.3    /  DBili  x      /  AST  18     /  ALT  20     /  AlkPhos  75     01 May 2018 03:45    PT/INR - ( 01 May 2018 14:00 )   PT: 11.4 sec;   INR: 1.04 ratio               Blood Culture:   05-01 @ 03:45  Pro Bnp [id number]    05-01 @ 03:45  TSH: 2.82      RADIOLOGY:    EKG:    Echo:

## 2018-05-02 NOTE — PROGRESS NOTE ADULT - SUBJECTIVE AND OBJECTIVE BOX
INTERVAL HPI/OVERNIGHT EVENTS: Patient seen and examined at bedside.     MEDICATIONS  (STANDING):  amLODIPine   Tablet 2.5 milliGRAM(s) Oral at bedtime  amLODIPine   Tablet 5 milliGRAM(s) Oral daily  dextrose 5%. 1000 milliLiter(s) (50 mL/Hr) IV Continuous <Continuous>  dextrose 50% Injectable 12.5 Gram(s) IV Push once  dextrose 50% Injectable 25 Gram(s) IV Push once  dextrose 50% Injectable 25 Gram(s) IV Push once  furosemide   Injectable 40 milliGRAM(s) IV Push two times a day  guaiFENesin   Syrup  (Sugar-Free) 100 milliGRAM(s) Oral every 6 hours  heparin  Injectable 5000 Unit(s) SubCutaneous every 12 hours  imipramine 50 milliGRAM(s) Oral daily  insulin glargine Injectable (LANTUS) 14 Unit(s) SubCutaneous at bedtime  insulin lispro (HumaLOG) corrective regimen sliding scale   SubCutaneous three times a day before meals  melatonin 3 milliGRAM(s) Oral at bedtime  metoprolol succinate ER 50 milliGRAM(s) Oral daily  tiotropium 18 MICROgram(s) Capsule 1 Capsule(s) Inhalation daily    MEDICATIONS  (PRN):  ALBUTerol    0.083% 2.5 milliGRAM(s) Nebulizer every 6 hours PRN Shortness of Breath and/or Wheezing  dextrose Gel 1 Dose(s) Oral once PRN Blood Glucose LESS THAN 70 milliGRAM(s)/deciliter  glucagon  Injectable 1 milliGRAM(s) IntraMuscular once PRN Glucose LESS THAN 70 milligrams/deciliter    Allergies    latex (Unknown)  No Known Drug Allergies    Intolerances    Review of Systems:   Constitutional: (-) denies fever, chills, diaphoresis   HEENT: denies blurry vision, difficulty hearing  Respiratory: (+) admits SOB, ADAMSON, cough, sputum production  Cardiovascular: (-) denies CP, palpitations, (+) edema  Gastrointestinal: denies nausea, vomiting, diarrhea, constipation, abdominal pain, melena, hematochezia   Genitourinary: denies dysuria, frequency, urgency, hematuria   Skin/Breast: denies rash, itching  Musculoskeletal: denies myalgias, joint swelling, muscle weakness  Neurologic: denies headache, weakness, dizziness, paresthesias, numbness/tingling  Psychiatric: denies feeling anxious, depressed, suicidal, homicidal thoughts  Hematology/Oncology: denies bruising, tender or enlarged lymph nodes   ROS negative except as noted above    Vital Signs Last 24 Hrs  T(C): 36.7 (02 May 2018 05:22), Max: 37 (01 May 2018 07:27)  T(F): 98 (02 May 2018 05:22), Max: 98.6 (01 May 2018 07:27)  HR: 96 (02 May 2018 05:22) (77 - 102)  BP: 164/86 (02 May 2018 05:22) (142/80 - 172/85)  RR: 18 (02 May 2018 05:22) (18 - 20)  SpO2: 99% (02 May 2018 05:22) (94% - 99%)    Physical Exam  General: Elderly  female in no acute respiratory distress  HEENT: NCAT, PERRLA, EOMI bl, moist mucous membranes   Neck: Supple, nontender, no mass  Neurology: A&Ox3, nonfocal, CN II-XII grossly intact, sensation intact, no gait abnormalities   Respiratory: decreased breath sound bilaterally, no wheezing  CV: RRR, +S1/S2, no murmurs, rubs or gallops  Abdominal: Soft, NT, ND +BSx4  Extremities: No C/C/E, + peripheral pulses  MSK: Normal ROM, no joint erythema or warmth, no joint swelling   Skin: warm, dry, normal color, no rash or abnormal lesion      LABS:          < from: CT Chest No Cont (05.01.18 @ 04:39) >  Small to moderate bilateral pleural effusions (right larger than the   left) with associated compressive atelectasis. Mild diffuse interstitial   pulmonary edema. INTERVAL HPI/OVERNIGHT EVENTS: Patient seen and examined at bedside. Patient states the SOB is better, denies chest pain, palpitations or lightheadedness. Patient scheduled for permacath placement today.    MEDICATIONS  (STANDING):  amLODIPine   Tablet 2.5 milliGRAM(s) Oral at bedtime  amLODIPine   Tablet 5 milliGRAM(s) Oral daily  dextrose 5%. 1000 milliLiter(s) (50 mL/Hr) IV Continuous <Continuous>  dextrose 50% Injectable 12.5 Gram(s) IV Push once  dextrose 50% Injectable 25 Gram(s) IV Push once  dextrose 50% Injectable 25 Gram(s) IV Push once  furosemide   Injectable 40 milliGRAM(s) IV Push two times a day  guaiFENesin   Syrup  (Sugar-Free) 100 milliGRAM(s) Oral every 6 hours  heparin  Injectable 5000 Unit(s) SubCutaneous every 12 hours  imipramine 50 milliGRAM(s) Oral daily  insulin glargine Injectable (LANTUS) 14 Unit(s) SubCutaneous at bedtime  insulin lispro (HumaLOG) corrective regimen sliding scale   SubCutaneous three times a day before meals  melatonin 3 milliGRAM(s) Oral at bedtime  metoprolol succinate ER 50 milliGRAM(s) Oral daily  tiotropium 18 MICROgram(s) Capsule 1 Capsule(s) Inhalation daily    MEDICATIONS  (PRN):  ALBUTerol    0.083% 2.5 milliGRAM(s) Nebulizer every 6 hours PRN Shortness of Breath and/or Wheezing  dextrose Gel 1 Dose(s) Oral once PRN Blood Glucose LESS THAN 70 milliGRAM(s)/deciliter  glucagon  Injectable 1 milliGRAM(s) IntraMuscular once PRN Glucose LESS THAN 70 milligrams/deciliter    Allergies    latex (Unknown)  No Known Drug Allergies    Intolerances    Review of Systems:   Constitutional: (-) denies fever, chills, diaphoresis   HEENT: denies blurry vision, difficulty hearing  Respiratory: (-) admits SOB, ADAMSON, cough, sputum production  Cardiovascular: (-) denies CP, palpitations, (+) edema  Gastrointestinal: denies nausea, vomiting, diarrhea, constipation, abdominal pain, melena, hematochezia   Genitourinary: denies dysuria, frequency, urgency, hematuria   Skin/Breast: denies rash, itching  Musculoskeletal: denies myalgias, joint swelling, muscle weakness  Neurologic: denies headache, weakness, dizziness, paresthesias, numbness/tingling  Psychiatric: denies feeling anxious, depressed, suicidal, homicidal thoughts  Hematology/Oncology: denies bruising, tender or enlarged lymph nodes   ROS negative except as noted above    Vital Signs Last 24 Hrs  T(C): 37.1 (02 May 2018 08:05), Max: 37.1 (02 May 2018 08:05)  T(F): 98.7 (02 May 2018 08:05), Max: 98.7 (02 May 2018 08:05)  HR: 93 (02 May 2018 08:05) (77 - 102)  BP: 144/78 (02 May 2018 08:05) (142/80 - 172/85)  RR: 20 (02 May 2018 08:05) (18 - 20)  SpO2: 93% (02 May 2018 08:05) (93% - 99%)      Physical Exam  General: Elderly  female in no acute respiratory distress  HEENT: NCAT, PERRLA, EOMI bl, moist mucous membranes   Neck: Supple, nontender, no mass  Neurology: A&Ox3, nonfocal, CN II-XII grossly intact, sensation intact, no gait abnormalities   Respiratory: decreased breath sound bilaterally, no wheezing  CV: RRR, +S1/S2, no murmurs, rubs or gallops  Abdominal: Soft, NT, ND +BSx4  Extremities: No C/C/E, + peripheral pulses  MSK: Normal ROM, no joint erythema or warmth, no joint swelling   Skin: warm, dry, normal color, no rash or abnormal lesion      LABS:                                9.7    22.2  )-----------( 480      ( 02 May 2018 06:37 )             28.4     05-02    136  |  100  |  69<H>  ----------------------------<  265<H>  4.9   |  20<L>  |  4.50<H>    Ca    8.8      02 May 2018 06:37    TPro  7.3  /  Alb  3.1<L>  /  TBili  0.3  /  DBili  x   /  AST  18  /  ALT  20  /  AlkPhos  75  05-01    < from: CT Chest No Cont (05.01.18 @ 04:39) >  Small to moderate bilateral pleural effusions (right larger than the   left) with associated compressive atelectasis. Mild diffuse interstitial   pulmonary edema.

## 2018-05-02 NOTE — PROGRESS NOTE ADULT - SUBJECTIVE AND OBJECTIVE BOX
Patient is a 67y Female whom presented to the hospital with esrd required hd   no fever no chills no abd pain     PAST MEDICAL & SURGICAL HISTORY:  h/o Smoking: quitted 3/2012  Murmur, cardiac  PAD (peripheral artery disease)  h/o Hepatitis A 1969: currently resolved, no symptoms  CAD (coronary artery disease)  h/o Myocardial infarct 2007  Depression  h/o Anxiety attack  HTN (hypertension)  Diabetes mellitus II  s/p surgical removal of benign Skin lesion epigastric area  s/p Ovarian cyst removal  coronary stent 2007      MEDICATIONS  (STANDING):  amLODIPine   Tablet 2.5 milliGRAM(s) Oral at bedtime  amLODIPine   Tablet 5 milliGRAM(s) Oral daily  aspirin enteric coated 81 milliGRAM(s) Oral daily  dextrose 5%. 1000 milliLiter(s) (50 mL/Hr) IV Continuous <Continuous>  dextrose 50% Injectable 12.5 Gram(s) IV Push once  dextrose 50% Injectable 25 Gram(s) IV Push once  dextrose 50% Injectable 25 Gram(s) IV Push once  guaiFENesin   Syrup  (Sugar-Free) 100 milliGRAM(s) Oral every 6 hours  heparin  Injectable 5000 Unit(s) SubCutaneous every 12 hours  imipramine 50 milliGRAM(s) Oral daily  insulin glargine Injectable (LANTUS) 7 Unit(s) SubCutaneous at bedtime  insulin lispro (HumaLOG) corrective regimen sliding scale   SubCutaneous three times a day before meals  metoprolol succinate ER 50 milliGRAM(s) Oral daily  sodium chloride 0.9%. 1000 milliLiter(s) (60 mL/Hr) IV Continuous <Continuous>  tiotropium 18 MICROgram(s) Capsule 1 Capsule(s) Inhalation daily              FAMILY HISTORY:  No pertinent family history in first degree relatives      REVIEW OF SYSTEMS:    CONSTITUTIONAL: No weakness, fevers or chills  EYES/ENT: No visual changes;  no throat pain   NECK: No pain or stiffness  RESPIRATORY: No cough, wheezing, hemoptysis; pos  shortness of breath  CARDIOVASCULAR: No chest pain or palpitations  GASTROINTESTINAL: No abdominal or epigastric pain. No nausea, vomiting,     No diarrhea or constipation. No melena   GENITOURINARY: No dysuria, frequency or hematuria  NEUROLOGICAL: No numbness or weakness  SKIN: dry      VITAL:  T(C): , Max: 37 (05-01-18 @ 03:23)  T(F): , Max: 98.6 (05-01-18 @ 03:23)  HR: 102 (05-01-18 @ 09:02)  BP: 160/87 (05-01-18 @ 09:02)  BP(mean): --  RR: 20 (05-01-18 @ 09:02)  SpO2: 95% (05-01-18 @ 09:02)  Wt(kg): --    I and O's:    04-30 @ 07:01  -  05-01 @ 07:00  --------------------------------------------------------  IN: 1000 mL / OUT: 0 mL / NET: 1000 mL      Height (cm): 177.8 (05-01 @ 06:15)  Weight (kg): 69.4 (05-01 @ 06:15)  BMI (kg/m2): 22 (05-01 @ 06:15)  BSA (m2): 1.86 (05-01 @ 06:15)    PHYSICAL EXAM:    Constitutional: NAD  HEENT: conjunctive   clear   Neck:  No JVD  Respiratory: CTAB  Cardiovascular: S1 and S2  Gastrointestinal: BS+, soft, NT/ND  Extremities: pos  peripheral edema  Neurological: A/O x 3, no focal deficits  Psychiatric: Normal mood, normal affect  : No Renteria  Skin: dry       LABS:                        9.9    10.8  )-----------( 420      ( 01 May 2018 03:45 )             28.2     05-01    139  |  107  |  54<H>  ----------------------------<  266<H>  4.1   |  22  |  4.10<H>    Ca    8.8      01 May 2018 03:45    TPro  7.3  /  Alb  3.1<L>  /  TBili  0.3  /  DBili  x   /  AST  18  /  ALT  20  /  AlkPhos  75  05-01

## 2018-05-02 NOTE — PROGRESS NOTE ADULT - PROBLEM SELECTOR PLAN 4
Smoker, no Hx of COPD.   out pt f/u with pul. and PFTs. Patient with WBC of 22, but has been afebrile,  not on steroids  Unknown source  Follow up on UA  Order procalcitonin  Repeat afternoon CBC Patient with WBC of 22, but has been afebrile,  not on steroids  Unknown source  Follow up on UA. ID consult  Order procalcitonin  Repeat afternoon CBC

## 2018-05-02 NOTE — PROGRESS NOTE ADULT - PROBLEM SELECTOR PLAN 5
-Chronic. On Lantus 14 units qHS at home  - Started on Lantus 14 units qHS, however blood glucose continues to be above 250, may need to increase insulin dose  - Low dose Steve PÉREZ, Hypoglycemic protocol   - f/u HgbA1c  - Diet: Consistent carb Smoker, no Hx of COPD.   out pt f/u with pul. and PFTs.

## 2018-05-02 NOTE — PROGRESS NOTE ADULT - PROBLEM SELECTOR PLAN 1
-Patient has no prior history of HF, however pro BNP (33,668).   -Continue on Lasix 40mg IV BID. Received additional dose of Lasix yesterday  - TTE read pending   - Cardio ([facility]) consulted.  -Patient follows Dr. Flo Rios (cardio)  -Strict I and Os -Patient has no prior history of HF, however pro BNP (33,668).   -Continue on Lasix 40mg IV BID. Received additional dose of Lasix yesterday  - TTE read pending   - f/u with Cardio   -Patient follows Dr. Flo Rios (cardio)  -Strict I and Os

## 2018-05-02 NOTE — PROGRESS NOTE ADULT - SUBJECTIVE AND OBJECTIVE BOX
Date/Time Patient Seen:  		  Referring MD:   Data Reviewed	       Patient is a 67y old  Female who presents with a chief complaint of dypsnea (01 May 2018 05:21)  in bed  seen and examined  vs and meds reviewed  on LASIX  on o2        Subjective/HPI     PAST MEDICAL & SURGICAL HISTORY:  h/o Smoking: quitted 3/2012  Murmur, cardiac  PAD (peripheral artery disease)  h/o Hepatitis A 1969: currently resolved, no symptoms  CAD (coronary artery disease)  CAD (coronary artery disease)  h/o Myocardial infarct 2007  Depression  h/o Anxiety attack  HTN (hypertension)  Diabetes mellitus II  s/p surgical removal of benign Skin lesion epigastric area  s/p Ovarian cyst removal  coronary stent 2007        Medication list         MEDICATIONS  (STANDING):  amLODIPine   Tablet 2.5 milliGRAM(s) Oral at bedtime  amLODIPine   Tablet 5 milliGRAM(s) Oral daily  dextrose 5%. 1000 milliLiter(s) (50 mL/Hr) IV Continuous <Continuous>  dextrose 50% Injectable 12.5 Gram(s) IV Push once  dextrose 50% Injectable 25 Gram(s) IV Push once  dextrose 50% Injectable 25 Gram(s) IV Push once  furosemide   Injectable 40 milliGRAM(s) IV Push two times a day  guaiFENesin   Syrup  (Sugar-Free) 100 milliGRAM(s) Oral every 6 hours  heparin  Injectable 5000 Unit(s) SubCutaneous every 12 hours  imipramine 50 milliGRAM(s) Oral daily  insulin glargine Injectable (LANTUS) 14 Unit(s) SubCutaneous at bedtime  insulin lispro (HumaLOG) corrective regimen sliding scale   SubCutaneous three times a day before meals  melatonin 3 milliGRAM(s) Oral at bedtime  metoprolol succinate ER 50 milliGRAM(s) Oral daily  tiotropium 18 MICROgram(s) Capsule 1 Capsule(s) Inhalation daily    MEDICATIONS  (PRN):  ALBUTerol    0.083% 2.5 milliGRAM(s) Nebulizer every 6 hours PRN Shortness of Breath and/or Wheezing  dextrose Gel 1 Dose(s) Oral once PRN Blood Glucose LESS THAN 70 milliGRAM(s)/deciliter  glucagon  Injectable 1 milliGRAM(s) IntraMuscular once PRN Glucose LESS THAN 70 milligrams/deciliter         Vitals log        ICU Vital Signs Last 24 Hrs  T(C): 36.7 (02 May 2018 05:22), Max: 37 (01 May 2018 07:27)  T(F): 98 (02 May 2018 05:22), Max: 98.6 (01 May 2018 07:27)  HR: 96 (02 May 2018 05:22) (77 - 102)  BP: 164/86 (02 May 2018 05:22) (142/80 - 172/85)  BP(mean): --  ABP: --  ABP(mean): --  RR: 18 (02 May 2018 05:22) (18 - 20)  SpO2: 99% (02 May 2018 05:22) (94% - 99%)           Input and Output:  I&O's Detail    30 Apr 2018 07:01  -  01 May 2018 07:00  --------------------------------------------------------  IN:    Sodium Chloride 0.9% IV Bolus: 1000 mL  Total IN: 1000 mL    OUT:  Total OUT: 0 mL    Total NET: 1000 mL      01 May 2018 07:01  -  02 May 2018 06:52  --------------------------------------------------------  IN:    IV PiggyBack: 60 mL    Oral Fluid: 540 mL    sodium chloride 0.9%: 60 mL  Total IN: 660 mL    OUT:    Voided: 350 mL  Total OUT: 350 mL    Total NET: 310 mL          Lab Data                        9.9    10.8  )-----------( 420      ( 01 May 2018 03:45 )             28.2     05-01    139  |  107  |  54<H>  ----------------------------<  266<H>  4.1   |  22  |  4.10<H>    Ca    8.8      01 May 2018 03:45    TPro  7.3  /  Alb  3.1<L>  /  TBili  0.3  /  DBili  x   /  AST  18  /  ALT  20  /  AlkPhos  75  05-01            Review of Systems	      Objective     Physical Examination    head at  heart s1s2  lung dec BS  abd soft  on o2      Pertinent Lab findings & Imaging      Dexter:  NO   Adequate UO     I&O's Detail    30 Apr 2018 07:01  -  01 May 2018 07:00  --------------------------------------------------------  IN:    Sodium Chloride 0.9% IV Bolus: 1000 mL  Total IN: 1000 mL    OUT:  Total OUT: 0 mL    Total NET: 1000 mL      01 May 2018 07:01  -  02 May 2018 06:52  --------------------------------------------------------  IN:    IV PiggyBack: 60 mL    Oral Fluid: 540 mL    sodium chloride 0.9%: 60 mL  Total IN: 660 mL    OUT:    Voided: 350 mL  Total OUT: 350 mL    Total NET: 310 mL               Discussed with:     Cultures:	        Radiology

## 2018-05-02 NOTE — PROGRESS NOTE ADULT - PROBLEM SELECTOR PLAN 2
-DAVIS on CKD  -pt is for HD today, will have Permacath place today  - HOLD NSAIDs, ACE, ARBs   - trend BUN/Cr  - f/u Nephro Dr. Reina -DAVIS on CKD  -pt is for HD today, Permacath will not be placed today because of leukocytosis. Dr. Rodas (Vascular) will place central line  - HOLD NSAIDs, ACE, ARBs   - trend BUN/Cr  - f/u Nephro Dr. Reina -DAVIS on CKD  -pt is for HD today, for Permacath placement today   - HOLD NSAIDs, ACE, ARBs   - trend BUN/Cr  - f/u Nephro Dr. Reina

## 2018-05-02 NOTE — PROGRESS NOTE ADULT - PROBLEM SELECTOR PLAN 3
Normal MCV has ch anemia 2/2 CKD  Continue to monitor Normal MCV, iron panel revealed mildly elevated ferritin with low normal iron. Patient may benefit from iron supplements  Continue to monitor

## 2018-05-02 NOTE — PROGRESS NOTE ADULT - PROBLEM SELECTOR PLAN 7
Chronic.   - Continue Imipramine -Blood pressure continues to be elevated on amlodipine, lasix, and metoprolol.  -May need to make adjustments to medications

## 2018-05-03 ENCOUNTER — TRANSCRIPTION ENCOUNTER (OUTPATIENT)
Age: 68
End: 2018-05-03

## 2018-05-03 DIAGNOSIS — R06.00 DYSPNEA, UNSPECIFIED: ICD-10-CM

## 2018-05-03 DIAGNOSIS — Z78.9 OTHER SPECIFIED HEALTH STATUS: ICD-10-CM

## 2018-05-03 LAB
ANION GAP SERPL CALC-SCNC: 12 MMOL/L — SIGNIFICANT CHANGE UP (ref 5–17)
BUN SERPL-MCNC: 52 MG/DL — HIGH (ref 7–23)
CALCIUM SERPL-MCNC: 8.4 MG/DL — LOW (ref 8.5–10.1)
CHLORIDE SERPL-SCNC: 100 MMOL/L — SIGNIFICANT CHANGE UP (ref 96–108)
CO2 SERPL-SCNC: 26 MMOL/L — SIGNIFICANT CHANGE UP (ref 22–31)
CREAT SERPL-MCNC: 3.8 MG/DL — HIGH (ref 0.5–1.3)
GLUCOSE SERPL-MCNC: 184 MG/DL — HIGH (ref 70–99)
HCT VFR BLD CALC: 27.9 % — LOW (ref 34.5–45)
HGB BLD-MCNC: 9.9 G/DL — LOW (ref 11.5–15.5)
MCHC RBC-ENTMCNC: 30.3 PG — SIGNIFICANT CHANGE UP (ref 27–34)
MCHC RBC-ENTMCNC: 35.6 GM/DL — SIGNIFICANT CHANGE UP (ref 32–36)
MCV RBC AUTO: 85.1 FL — SIGNIFICANT CHANGE UP (ref 80–100)
PLATELET # BLD AUTO: 365 K/UL — SIGNIFICANT CHANGE UP (ref 150–400)
POTASSIUM SERPL-MCNC: 3.9 MMOL/L — SIGNIFICANT CHANGE UP (ref 3.5–5.3)
POTASSIUM SERPL-SCNC: 3.9 MMOL/L — SIGNIFICANT CHANGE UP (ref 3.5–5.3)
RBC # BLD: 3.28 M/UL — LOW (ref 3.8–5.2)
RBC # FLD: 14.6 % — HIGH (ref 10.3–14.5)
SODIUM SERPL-SCNC: 138 MMOL/L — SIGNIFICANT CHANGE UP (ref 135–145)
WBC # BLD: 18.3 K/UL — HIGH (ref 3.8–10.5)
WBC # FLD AUTO: 18.3 K/UL — HIGH (ref 3.8–10.5)

## 2018-05-03 PROCEDURE — 99233 SBSQ HOSP IP/OBS HIGH 50: CPT

## 2018-05-03 PROCEDURE — 71045 X-RAY EXAM CHEST 1 VIEW: CPT | Mod: 26

## 2018-05-03 PROCEDURE — 99233 SBSQ HOSP IP/OBS HIGH 50: CPT | Mod: GC

## 2018-05-03 RX ORDER — INSULIN GLARGINE 100 [IU]/ML
18 INJECTION, SOLUTION SUBCUTANEOUS AT BEDTIME
Qty: 0 | Refills: 0 | Status: DISCONTINUED | OUTPATIENT
Start: 2018-05-03 | End: 2018-05-04

## 2018-05-03 RX ADMIN — Medication 50 MILLIGRAM(S): at 05:14

## 2018-05-03 RX ADMIN — Medication 6: at 12:34

## 2018-05-03 RX ADMIN — Medication 50 MILLIGRAM(S): at 11:26

## 2018-05-03 RX ADMIN — HEPARIN SODIUM 5000 UNIT(S): 5000 INJECTION INTRAVENOUS; SUBCUTANEOUS at 05:19

## 2018-05-03 RX ADMIN — AMLODIPINE BESYLATE 5 MILLIGRAM(S): 2.5 TABLET ORAL at 05:14

## 2018-05-03 RX ADMIN — HEPARIN SODIUM 5000 UNIT(S): 5000 INJECTION INTRAVENOUS; SUBCUTANEOUS at 18:00

## 2018-05-03 RX ADMIN — INSULIN GLARGINE 18 UNIT(S): 100 INJECTION, SOLUTION SUBCUTANEOUS at 22:00

## 2018-05-03 RX ADMIN — Medication 100 MILLIGRAM(S): at 11:27

## 2018-05-03 RX ADMIN — Medication 3 MILLIGRAM(S): at 22:00

## 2018-05-03 RX ADMIN — Medication 40 MILLIGRAM(S): at 05:19

## 2018-05-03 RX ADMIN — Medication 2: at 08:32

## 2018-05-03 RX ADMIN — TIOTROPIUM BROMIDE 1 CAPSULE(S): 18 CAPSULE ORAL; RESPIRATORY (INHALATION) at 11:26

## 2018-05-03 RX ADMIN — Medication 40 MILLIGRAM(S): at 18:01

## 2018-05-03 RX ADMIN — Medication 4: at 22:01

## 2018-05-03 RX ADMIN — AMLODIPINE BESYLATE 2.5 MILLIGRAM(S): 2.5 TABLET ORAL at 22:00

## 2018-05-03 NOTE — DISCHARGE NOTE ADULT - PLAN OF CARE
Resolution of Sx Continue taking lasix  Follow up with your cardiologist within 1 week of discharge. You will need to continue with Dialysis  Follow up with you nephrologist within 1 week of discharge. Continue taking epogen Continue amlodipine and metoprolol. Continue insulin. Resolution of Sx, Acute on Chronic systolic CHF, EF 30 % Follow up with your cardiologist within 1 week of discharge. chronic, 2/2 CKD. Continue insulin and other home regimen.

## 2018-05-03 NOTE — DISCHARGE NOTE ADULT - ADDITIONAL INSTRUCTIONS
Follow up with you PCP within 1 week of discharge Follow up with you PCP and nephrologist within 1 week of discharge

## 2018-05-03 NOTE — PROGRESS NOTE ADULT - SUBJECTIVE AND OBJECTIVE BOX
Date/Time Patient Seen:  		  Referring MD:   Data Reviewed	       Patient is a 67y old  Female who presents with a chief complaint of dypsnea (01 May 2018 05:21)    in bed  seen and examined  vs and meds reviewed  on LASIX  planned for poss HD as per renal    s/p HD cath insertion / RIJ      Subjective/HPI     PAST MEDICAL & SURGICAL HISTORY:  h/o Smoking: quitted 3/2012  Murmur, cardiac  PAD (peripheral artery disease)  h/o Hepatitis A 1969: currently resolved, no symptoms  CAD (coronary artery disease)  CAD (coronary artery disease)  h/o Myocardial infarct 2007  Depression  h/o Anxiety attack  HTN (hypertension)  Diabetes mellitus II  s/p surgical removal of benign Skin lesion epigastric area  s/p Ovarian cyst removal  coronary stent 2007        Medication list         MEDICATIONS  (STANDING):  amLODIPine   Tablet 2.5 milliGRAM(s) Oral at bedtime  amLODIPine   Tablet 5 milliGRAM(s) Oral daily  dextrose 5%. 1000 milliLiter(s) (50 mL/Hr) IV Continuous <Continuous>  dextrose 50% Injectable 12.5 Gram(s) IV Push once  dextrose 50% Injectable 25 Gram(s) IV Push once  dextrose 50% Injectable 25 Gram(s) IV Push once  furosemide   Injectable 40 milliGRAM(s) IV Push two times a day  guaiFENesin   Syrup  (Sugar-Free) 100 milliGRAM(s) Oral every 6 hours  heparin  Injectable 5000 Unit(s) SubCutaneous every 12 hours  imipramine 50 milliGRAM(s) Oral daily  insulin glargine Injectable (LANTUS) 16 Unit(s) SubCutaneous at bedtime  insulin lispro (HumaLOG) corrective regimen sliding scale   SubCutaneous three times a day before meals  insulin lispro (HumaLOG) corrective regimen sliding scale   SubCutaneous at bedtime  melatonin 3 milliGRAM(s) Oral at bedtime  metoprolol succinate ER 50 milliGRAM(s) Oral daily  tiotropium 18 MICROgram(s) Capsule 1 Capsule(s) Inhalation daily    MEDICATIONS  (PRN):  acetaminophen   Tablet. 650 milliGRAM(s) Oral every 6 hours PRN Mild Pain (1 - 3)  ALBUTerol    0.083% 2.5 milliGRAM(s) Nebulizer every 6 hours PRN Shortness of Breath and/or Wheezing  dextrose Gel 1 Dose(s) Oral once PRN Blood Glucose LESS THAN 70 milliGRAM(s)/deciliter  glucagon  Injectable 1 milliGRAM(s) IntraMuscular once PRN Glucose LESS THAN 70 milligrams/deciliter         Vitals log        ICU Vital Signs Last 24 Hrs  T(C): 36.9 (03 May 2018 04:43), Max: 37.4 (03 May 2018 00:03)  T(F): 98.4 (03 May 2018 04:43), Max: 99.3 (03 May 2018 00:03)  HR: 97 (03 May 2018 04:43) (93 - 102)  BP: 153/81 (03 May 2018 04:43) (144/78 - 171/-)  BP(mean): 92 (02 May 2018 18:05) (92 - 92)  ABP: --  ABP(mean): --  RR: 20 (03 May 2018 04:43) (18 - 20)  SpO2: 94% (03 May 2018 04:43) (93% - 100%)           Input and Output:  I&O's Detail    01 May 2018 07:01  -  02 May 2018 07:00  --------------------------------------------------------  IN:    IV PiggyBack: 60 mL    Oral Fluid: 540 mL    sodium chloride 0.9%: 60 mL  Total IN: 660 mL    OUT:    Voided: 350 mL  Total OUT: 350 mL    Total NET: 310 mL      02 May 2018 07:01  -  03 May 2018 06:56  --------------------------------------------------------  IN:  Total IN: 0 mL    OUT:    Other: 1000 mL  Total OUT: 1000 mL    Total NET: -1000 mL          Lab Data                        10.0   21.4  )-----------( 413      ( 02 May 2018 22:50 )             28.6     05-02    136  |  100  |  69<H>  ----------------------------<  265<H>  4.9   |  20<L>  |  4.50<H>    Ca    8.8      02 May 2018 06:37              Review of Systems	      Objective     Physical Examination    head at  heart s1s2  lung dec BS  abd soft  cn grossly int      Pertinent Lab findings & Imaging      Dexter:  NO   Adequate UO     I&O's Detail    01 May 2018 07:01  -  02 May 2018 07:00  --------------------------------------------------------  IN:    IV PiggyBack: 60 mL    Oral Fluid: 540 mL    sodium chloride 0.9%: 60 mL  Total IN: 660 mL    OUT:    Voided: 350 mL  Total OUT: 350 mL    Total NET: 310 mL      02 May 2018 07:01  -  03 May 2018 06:56  --------------------------------------------------------  IN:  Total IN: 0 mL    OUT:    Other: 1000 mL  Total OUT: 1000 mL    Total NET: -1000 mL               Discussed with:     Cultures:	        Radiology

## 2018-05-03 NOTE — DISCHARGE NOTE ADULT - PATIENT PORTAL LINK FT
You can access the [facility], offered by [facility], by registering with the following website: [email or web address]

## 2018-05-03 NOTE — CONSULT NOTE ADULT - PROBLEM SELECTOR PROBLEM 1
Stage 5 chronic kidney disease not on chronic dialysis
Dyspnea and respiratory abnormalities
Dyspnea

## 2018-05-03 NOTE — DISCHARGE NOTE ADULT - CARE PROVIDERS DIRECT ADDRESSES
[email or web address]

## 2018-05-03 NOTE — PROGRESS NOTE ADULT - SUBJECTIVE AND OBJECTIVE BOX
Patient is a 67y Female whom presented to the hospital with esrd required hd and sob due to maney years poor dm controlled with hga1c above 10   no fever no chills no abd pain , feeling better     PAST MEDICAL & SURGICAL HISTORY:  h/o Smoking: quitted 3/2012  Murmur, cardiac  PAD (peripheral artery disease)  h/o Hepatitis A 1969: currently resolved, no symptoms  CAD (coronary artery disease)  h/o Myocardial infarct   Depression  h/o Anxiety attack  HTN (hypertension)  Diabetes mellitus II  s/p surgical removal of benign Skin lesion epigastric area  s/p Ovarian cyst removal  coronary stent       MEDICATIONS  (STANDING):  amLODIPine   Tablet 2.5 milliGRAM(s) Oral at bedtime  amLODIPine   Tablet 5 milliGRAM(s) Oral daily  aspirin enteric coated 81 milliGRAM(s) Oral daily  dextrose 5%. 1000 milliLiter(s) (50 mL/Hr) IV Continuous <Continuous>  dextrose 50% Injectable 12.5 Gram(s) IV Push once  dextrose 50% Injectable 25 Gram(s) IV Push once  dextrose 50% Injectable 25 Gram(s) IV Push once  guaiFENesin   Syrup  (Sugar-Free) 100 milliGRAM(s) Oral every 6 hours  heparin  Injectable 5000 Unit(s) SubCutaneous every 12 hours  imipramine 50 milliGRAM(s) Oral daily  insulin glargine Injectable (LANTUS) 7 Unit(s) SubCutaneous at bedtime  insulin lispro (HumaLOG) corrective regimen sliding scale   SubCutaneous three times a day before meals  metoprolol succinate ER 50 milliGRAM(s) Oral daily  sodium chloride 0.9%. 1000 milliLiter(s) (60 mL/Hr) IV Continuous <Continuous>  tiotropium 18 MICROgram(s) Capsule 1 Capsule(s) Inhalation daily                              9.9    18.3  )-----------( 365      ( 03 May 2018 07:37 )             27.9       CBC Full  -  ( 03 May 2018 07:37 )  WBC Count : 18.3 K/uL  Hemoglobin : 9.9 g/dL  Hematocrit : 27.9 %  Platelet Count - Automated : 365 K/uL  Mean Cell Volume : 85.1 fl  Mean Cell Hemoglobin : 30.3 pg  Mean Cell Hemoglobin Concentration : 35.6 gm/dL  Auto Neutrophil # : x  Auto Lymphocyte # : x  Auto Monocyte # : x  Auto Eosinophil # : x  Auto Basophil # : x  Auto Neutrophil % : x  Auto Lymphocyte % : x  Auto Monocyte % : x  Auto Eosinophil % : x  Auto Basophil % : x      05-03    138  |  100  |  52<H>  ----------------------------<  184<H>  3.9   |  26  |  3.80<H>    Ca    8.4<L>      03 May 2018 07:37        CAPILLARY BLOOD GLUCOSE      POCT Blood Glucose.: 296 mg/dL (03 May 2018 11:47)  POCT Blood Glucose.: 185 mg/dL (03 May 2018 07:36)  POCT Blood Glucose.: 298 mg/dL (02 May 2018 21:40)  POCT Blood Glucose.: 210 mg/dL (02 May 2018 16:56)      Vital Signs Last 24 Hrs  T(C): 37 (03 May 2018 15:12), Max: 37.4 (03 May 2018 00:03)  T(F): 98.6 (03 May 2018 15:12), Max: 99.3 (03 May 2018 00:03)  HR: 96 (03 May 2018 15:12) (55 - 102)  BP: 163/92 (03 May 2018 15:12) (134/77 - 171/-)  BP(mean): 92 (02 May 2018 18:05) (92 - 92)  RR: 18 (03 May 2018 15:12) (18 - 20)  SpO2: 96% (03 May 2018 15:12) (93% - 99%)    Urinalysis Basic - ( 02 May 2018 22:19 )    Color: Yellow / Appearance: Clear / S.010 / pH: x  Gluc: x / Ketone: Negative  / Bili: Negative / Urobili: Negative   Blood: x / Protein: 150 mg/dL / Nitrite: Negative   Leuk Esterase: Negative / RBC: 0-2 /HPF / WBC 3-5   Sq Epi: x / Non Sq Epi: Occasional / Bacteria: Negative              FAMILY HISTORY:  No pertinent family history in first degree relatives      REVIEW OF SYSTEMS:    CONSTITUTIONAL: No weakness, fevers or chills  EYES/ENT: No visual changes;  no throat pain   NECK: No pain or stiffness  RESPIRATORY: No cough, wheezing, hemoptysis; pos  shortness of breath  CARDIOVASCULAR: No chest pain or palpitations  GASTROINTESTINAL: No abdominal or epigastric pain. No nausea, vomiting,     No diarrhea or constipation. No melena   GENITOURINARY: No dysuria, frequency or hematuria  NEUROLOGICAL: No numbness or weakness  SKIN: dry          PHYSICAL EXAM:    Constitutional: NAD  HEENT: conjunctive   clear   Neck:  No JVD  Respiratory: CTAB  Cardiovascular: S1 and S2  Gastrointestinal: BS+, soft, NT/ND  Extremities: pos  peripheral edema  Neurological: A/O x 3, no focal deficits  Psychiatric: Normal mood, normal affect  : No Renteria  Skin: dry

## 2018-05-03 NOTE — PROGRESS NOTE ADULT - SUBJECTIVE AND OBJECTIVE BOX
CARDIOLOGY FOLLOW UP:  	  SUBJECTIVE:  Patient is a 67y old  Female who presents with a chief complaint of dypsnea (01 May 2018 05:21)    Seen and evaluated.  Nasal cannula in use, c/o ADAMSON but unaware of presence of ortho     OBJECTIVE:  Review Of Systems:  Constitutional: [ ] Fever [ ] Chills [ ] Fatigue [ ] Weight change   HEENT: [ ] Blurred vision [ ] Eye Pain [ ] Headache [ ] Runny nose [ ] Sore Throat   Respiratory: [ ] Cough [ ] Wheezing [ ] Shortness of breath  Cardiovascular: [ ] Chest Pain [ ] Palpitations [ ] ADAMSON [ ] PND [ ] Orthopnea  Gastrointestinal: [ ] Abdominal Pain [ ] Diarrhea [ ] Constipation [ ] Hemorrhoids [ ] Nausea [ ] Vomiting  Genitourinary: [ ] Nocturia [ ] Dysuria [ ] Incontinence  Extremities: [ ] Swelling [ ] Joint Pain  Neurologic: [ ] Focal deficit [ ] Paresthesias [ ] Syncope  Lymphatic: [ ] Swelling [ ] Lymphadenopathy   Skin: [ ] Rash [ ] Ecchymoses [ ] Wounds [ ] Lesions  Psychiatry: [ ] Depression [ ] Suicidal/Homicidal Ideation [ ] Anxiety [ ] Sleep Disturbances  [x ] 10 point review of systems is otherwise negative except as mentioned above            [ ]Unable to obtain    Allergy:  Allergies    latex (Unknown)  No Known Drug Allergies    Intolerances    Medications:  MEDICATIONS  (STANDING):  amLODIPine   Tablet 2.5 milliGRAM(s) Oral at bedtime  amLODIPine   Tablet 5 milliGRAM(s) Oral daily  dextrose 5%. 1000 milliLiter(s) (50 mL/Hr) IV Continuous <Continuous>  dextrose 50% Injectable 12.5 Gram(s) IV Push once  dextrose 50% Injectable 25 Gram(s) IV Push once  dextrose 50% Injectable 25 Gram(s) IV Push once  furosemide   Injectable 40 milliGRAM(s) IV Push two times a day  guaiFENesin   Syrup  (Sugar-Free) 100 milliGRAM(s) Oral every 6 hours  heparin  Injectable 5000 Unit(s) SubCutaneous every 12 hours  imipramine 50 milliGRAM(s) Oral daily  insulin glargine Injectable (LANTUS) 16 Unit(s) SubCutaneous at bedtime  insulin lispro (HumaLOG) corrective regimen sliding scale   SubCutaneous three times a day before meals  insulin lispro (HumaLOG) corrective regimen sliding scale   SubCutaneous at bedtime  melatonin 3 milliGRAM(s) Oral at bedtime  metoprolol succinate ER 50 milliGRAM(s) Oral daily  tiotropium 18 MICROgram(s) Capsule 1 Capsule(s) Inhalation daily    MEDICATIONS  (PRN):  acetaminophen   Tablet. 650 milliGRAM(s) Oral every 6 hours PRN Mild Pain (1 - 3)  ALBUTerol    0.083% 2.5 milliGRAM(s) Nebulizer every 6 hours PRN Shortness of Breath and/or Wheezing  dextrose Gel 1 Dose(s) Oral once PRN Blood Glucose LESS THAN 70 milliGRAM(s)/deciliter  glucagon  Injectable 1 milliGRAM(s) IntraMuscular once PRN Glucose LESS THAN 70 milligrams/deciliter    PMH/PSH/FH/SH: [ ] Unchanged    Vitals:  T(C): 37.1 (18 @ 08:07), Max: 37.4 (18 @ 00:03)  HR: 55 (18 @ 08:07) (55 - 102)  BP: 134/77 (18 @ 08:07) (134/77 - 171/-)  BP(mean): 92 (18 @ 18:05) (92 - 92)  RR: 18 (18 @ 08:07) (18 - 20)  SpO2: 95% (18 @ 08:07) (93% - 100%)  Wt(kg): --  Daily     Daily Weight in k.2 (02 May 2018 18:05)  I&O's Summary    02 May 2018 07:01  -  03 May 2018 07:00  --------------------------------------------------------  IN: 0 mL / OUT: 1000 mL / NET: -1000 mL    Labs:                        9.9    18.3  )-----------( 365      ( 03 May 2018 07:37 )             27.9     05    138  |  100  |  52<H>  ----------------------------<  184<H>  3.9   |  26  |  3.80<H>    Ca    8.4<L>      03 May 2018 07:37    PT/INR - ( 01 May 2018 14:00 )   PT: 11.4 sec;   INR: 1.04 ratio      ECG:    Echo:  < from: TTE Echo Doppler w/o Cont (18 @ 11:08) >     EXAM:  ECHO TTE W/O CON COMP W/DOPPLR      PROCEDURE DATE:  2018      INTERPRETATION:  INDICATION: Shortness of breath    Blood Pressure 160/87    Height 177.8     Weight 69       BSA 1.86    Dimensions:    LA 3.0       Normal Values: 2.0 - 4.0 cm    Ao 3.3        Normal Values: 2.0 - 3.8 cm  SEPTUM 1.1       Normal Values: 0.6 - 1.2 cm  PWT 1.0       Normal Values: 0.6 - 1.1 cm  LVIDd 4.6         Normal Values: 3.0 - 5.6 cm  LVIDs 3.9         Normal Values: 1.8 - 4.0 cm    Derived Variables:  LVMI     g/m2  RWT      Fractional Short      Ejection Fraction 30    Doppler Peak v. AoV=   (m/sec)    OBSERVATIONS:    Mitral Valve: MAC, moderate MR.  Aortic Valve/Aorta: Ossified trileaflet aortic valve.  Tricuspid Valve: normal with trace TR.  Pulmonic Valve: normal  Left Atrium: normal  Right Atrium: normal  Left Ventricle: The left ventricle appears to be normal in size with   moderate segmental dysfunction, estimated LVEF of 30%. The inferior,   posterior and lateral walls are all severely hypokinetic to akinetic. The   basal inferior wall appears thinned and aneurysmal. The apex is   hypokinetic, as is the distal anteroseptum. No apical thrombus is   identified.   Right Ventricle: normal size and systolic function.  Pericardium/Pleura: Bilateral pleural effusion, no significant   pericardial effusion.  Pulmonary/RV Pressure: Inadequate TR jet to estimate PA systolic pressure.  LV Diastolic Function:  E:E' is consistent with elevated left heart   filling pressure.    The left ventricle appears to be normal in size with moderate segmental   dysfunction, estimated LVEF of 30%. The inferior, posterior and lateral   walls are all severely hypokinetic to akinetic. The basal inferior wall   appears thinned and aneurysmal.The apex is hypokinetic, as is the distal   anteroseptum. No apical thrombus is identified. Normal right ventricular   size and systolic function.  E:E' is consistent with elevated left heart   filling pressure. Normal biatrial size. The aortic root is normal in   size. Mac, moderate MR. The aortic valve is calcified without stenosis or   insufficiency. Trace physiologic TR. Inadequate TR jet to estimate PA   systolic pressure. Bilateral pleural effusions. No significant   pericardial effusion.      MILENA YEBOAH M.D., ATTENDING CARDIOLOGIST  This document has been electronically signed. May  2 2018  1:41PM    < end of copied text >    Stress Testing:     Cath:    Imaging:    Interpretation of Telemetry: NSR    Physical Exam:  Appearance: [ x] Normal  [ ] abnormal [x ] NAD   Eyes: [ ] PERRL [ ] EOMI  HENT: [ ] Normal [ ] Abnormal oral muscosa [ ]NC/AT  Cardiovascular: [x ] S1 [x ] S2 [x ] RRR [ ] m/r/g [ ]edema [ ] JVP  Procedural Access Site: [ ]  hematoma [ ] tender to palpation [ ] 2+ pulse [ ] bruit [ ] Ecchymosis  Respiratory: [ ] Clear to auscultation bilaterally  [x] Fine rales at bases  Gastrointestinal: [ x] Soft [ ] tenderness[ ] distension [ x] BS  Musculoskeletal: [ ] clubbing [ ] joint deformity   Neurologic: [ x] Non-focal  Lymphatic: [ ] lymphadenopathy  Psychiatry: [x ] AAOx3  [ ] confused [ ] disoriented [x ] Mood & affect appropriate  Skin: [ ]  rashes [ ] ecchymoses [ ] cyanosis

## 2018-05-03 NOTE — DISCHARGE NOTE ADULT - HOSPITAL COURSE
Patient is a 68 y/o  female PMHx Type 2 Diabetes on lantus, HTN, CAD s/p stent (2007), Depression, Anxiety, former smoker brought in by EMS for shortness of breath. The sob has progressed over 1 week. The sob is associated with a productive cough with clear sputum. The sob/cough are worse with activity and relieved with rest. She admits to swelling in the feet that has increased this week. She denies recent travel or sick contacts. She received a flu vaccine this year. She denies fever, chills, cp, palpitations, N/V/C/D, abdominal pain.     In ED afebrile, tachycardic, hypertensive, SpO2 81% on RA, 94% on 2LNC. Labs significant for a WBC 10.8, BUN 54, Cr 4.1, Glucose 266, Lactate 0.9. CT chest small to moderate b/l pleural effusions, right > left. Meds: Duonebs, Solumedrol, Patient is a 68 y/o  female PMHx Type 2 Diabetes on lantus, HTN, CAD s/p stent (2007), Depression, Anxiety, former smoker that presented for shortness of breath. The SOB haD progressed over 1 week. The sob was associated with a productive cough with clear sputum,  worse with activity and relieved with rest.  Patient admitted to swelling in the feet that has increased the week of admission.  She denied fevers, chills, cp, palpitations, N/V/C/D, abdominal pain.     In ED, patient was afebrile, tachycardic, hypertensive. Labs significant for a WBC 10.8,  BUN 54, Cr 4.1, Glucose 266, Lactate 0.9, BNP 33, 668. CT chest small to moderate b/l pleural effusions, right > left. Patient admitted to telemetry unit. She was started on lasix IV, evaluated by cardiology (Dr. Price). Echo was done which revealed moderate segmental LVSD, EF 30%, elevated filling pressures, MAC, moderate MR, and B/L pleural effusions. Patient was also evaluated by Dr. Perez (nephro) and he recommended urgent hemodialysis. Patient was scheduled for PermCath placement, however developed leukocytosis. a RIJ cathether was placed instead. Infectious disease evaluated patient and recommended against use of antibiotics because leukocytosis was secondary to steroid use in the ED. White count started trending down and patient improved clinically, PermCath was placed. Patient is a 68 y/o  female PMHx Type 2 Diabetes on lantus, HTN, CAD s/p stent (2007), Depression, Anxiety, former smoker that presented for shortness of breath. The SOB haD progressed over 1 week. The sob was associated with a productive cough with clear sputum,  worse with activity and relieved with rest.  Patient admitted to swelling in the feet that has increased the week of admission.  She denied fevers, chills, cp, palpitations, N/V/C/D, abdominal pain.     In ED, patient was afebrile, tachycardic, hypertensive. Labs significant for a WBC 10.8,  BUN 54, Cr 4.1, Glucose 266, Lactate 0.9, BNP 33, 668. CT chest small to moderate b/l pleural effusions, right > left. Patient admitted to telemetry unit. She was started on lasix IV, evaluated by cardiology (Dr. Price). Echo was done which revealed moderate segmental LVSD, EF 30%, elevated filling pressures, MAC, moderate MR, and B/L pleural effusions. Patient was also evaluated by Dr. Perez (nephro) and he recommended urgent hemodialysis. Patient was scheduled for PermCath placement, however developed leukocytosis. a RIJ cathether was placed instead. Infectious disease evaluated patient and recommended against use of antibiotics because leukocytosis was secondary to steroid use in the ED. White count started trending down and patient improved clinically, PermCath was placed.    ·  Problem: CHF with unknown LVEF.  Plan: Acute on chronic systolic CHF, -Patient has no prior history of HF, however pro BNP (33,668).   -treated with Lasix 40mg  IV BID  -echo showed moderate segmental LVSD, EF 30%, elevated filling pressures, MAC, moderate MR, and B/L pleural effusions.  -Patient can decompensate quickly; will avoid IVF  -Cardio (Dr. Price) following  -Patient follows Dr. Flo Rios (cardio)        Problem/Plan - 2:  ·  Problem: Acute renal failure.  Plan: -DAVIS on CKD  -Permacath placed, had HD  - HOLD NSAIDs, ACE, ARBs   - trend BUN/Cr  - f/u Nephro Dr. Reina.      Problem/Plan - 3:  ·  Problem: Leukocytosis.  Plan: -Patient has leukocytosis but  has been afebrile and with no urinary symptoms or pneumonia  -procalcitonin was elevated (1.93), UA negative and blood cultures negative  -Repeat CXR was unchanged  -ID consulted: Dr. Shelby, suggest leukocytosis is secondary to steroid use. Patient did receive high dose solumedrol in ED. Will continue to monitor white count. pneumonia  ruled out.       Problem/Plan - 4:  ·  Problem: Anemia. 2/2 CKD,  Plan: Normal MCV, iron panel revealed mildly elevated ferritin with low normal iron. Patient on Epogen   Continue to monitor.      Problem/Plan - 5:  ·  Problem: COPD (chronic obstructive pulmonary disease).  Plan: Smoker, no Hx of COPD.   out pt f/u with pul. and PFTs.      Problem/Plan - 6:  Problem: Diabetes. Plan: -Chronic. with severe hyperglycemia, improved, d/s on her home regimen.   -- Accuchecks, Hypoglycemic protocol   - HgbA1c: 8.5  - Diet: Consistent carb.     Problem/Plan - 7:  ·  Problem: Hypertension.  Plan: -Continue  amlodipine, metoprolol.      Problem/Plan - 8:  ·  Problem: Depression.  Plan: Chronic.   - Continue Imipramine.     I saw and examined pt and spent 45 min and spoke  to her pcp and nephrologist Dr Castro.  PHYSICAL EXAM    Constitutional: NAD, well-groomed, well-developed  HEENT: PERRLA, EOMI, Normal Hearing, MMM  Neck: No LAD, No JVD  Back: Normal spine flexure, No CVA tenderness  Respiratory: CTAB/L   Cardiovascular: S1 and S2, RRR, no M/G/R  Gastrointestinal: BS+, soft, NT/ND  Extremities: No peripheral edema  Vascular: 2+ peripheral pulses  Neurological: A/O x 3, no focal deficits  Skin: No rashes

## 2018-05-03 NOTE — DISCHARGE NOTE ADULT - MEDICATION SUMMARY - MEDICATIONS TO CHANGE
I will SWITCH the dose or number of times a day I take the medications listed below when I get home from the hospital:    amLODIPine 2.5 mg oral tablet  -- 1 tab(s) by mouth once a day (at bedtime)    Metoprolol Succinate ER 50 mg oral tablet, extended release  -- 1 tab(s) by mouth once a day

## 2018-05-03 NOTE — PROGRESS NOTE ADULT - SUBJECTIVE AND OBJECTIVE BOX
INTERVAL HPI/OVERNIGHT EVENTS: Patient seen and examined at bedside.    MEDICATIONS  (STANDING):  amLODIPine   Tablet 2.5 milliGRAM(s) Oral at bedtime  amLODIPine   Tablet 5 milliGRAM(s) Oral daily  dextrose 5%. 1000 milliLiter(s) (50 mL/Hr) IV Continuous <Continuous>  dextrose 50% Injectable 12.5 Gram(s) IV Push once  dextrose 50% Injectable 25 Gram(s) IV Push once  dextrose 50% Injectable 25 Gram(s) IV Push once  furosemide   Injectable 40 milliGRAM(s) IV Push two times a day  guaiFENesin   Syrup  (Sugar-Free) 100 milliGRAM(s) Oral every 6 hours  heparin  Injectable 5000 Unit(s) SubCutaneous every 12 hours  imipramine 50 milliGRAM(s) Oral daily  insulin glargine Injectable (LANTUS) 16 Unit(s) SubCutaneous at bedtime  insulin lispro (HumaLOG) corrective regimen sliding scale   SubCutaneous three times a day before meals  insulin lispro (HumaLOG) corrective regimen sliding scale   SubCutaneous at bedtime  melatonin 3 milliGRAM(s) Oral at bedtime  metoprolol succinate ER 50 milliGRAM(s) Oral daily  tiotropium 18 MICROgram(s) Capsule 1 Capsule(s) Inhalation daily    MEDICATIONS  (PRN):  acetaminophen   Tablet. 650 milliGRAM(s) Oral every 6 hours PRN Mild Pain (1 - 3)  ALBUTerol    0.083% 2.5 milliGRAM(s) Nebulizer every 6 hours PRN Shortness of Breath and/or Wheezing  dextrose Gel 1 Dose(s) Oral once PRN Blood Glucose LESS THAN 70 milliGRAM(s)/deciliter  glucagon  Injectable 1 milliGRAM(s) IntraMuscular once PRN Glucose LESS THAN 70 milligrams/deciliter  Allergies    latex (Unknown)  No Known Drug Allergies    Intolerances    Review of Systems:   Constitutional: (-) denies fever, chills, diaphoresis   HEENT: denies blurry vision, difficulty hearing  Respiratory: (-) admits SOB, ADAMSON, cough, sputum production  Cardiovascular: (-) denies CP, palpitations, (+) edema  Gastrointestinal: denies nausea, vomiting, diarrhea, constipation, abdominal pain, melena, hematochezia   Genitourinary: denies dysuria, frequency, urgency, hematuria   Skin/Breast: denies rash, itching  Musculoskeletal: denies myalgias, joint swelling, muscle weakness  Neurologic: denies headache, weakness, dizziness, paresthesias, numbness/tingling  Psychiatric: denies feeling anxious, depressed, suicidal, homicidal thoughts  Hematology/Oncology: denies bruising, tender or enlarged lymph nodes   ROS negative except as noted above    Vital Signs Last 24 Hrs  T(C): 36.9 (03 May 2018 04:43), Max: 37.4 (03 May 2018 00:03)  T(F): 98.4 (03 May 2018 04:43), Max: 99.3 (03 May 2018 00:03)  HR: 97 (03 May 2018 04:43) (93 - 102)  BP: 153/81 (03 May 2018 04:43) (144/78 - 171/-)  BP(mean): 92 (02 May 2018 18:05) (92 - 92)  RR: 20 (03 May 2018 04:43) (18 - 20)  SpO2: 94% (03 May 2018 04:43) (93% - 100%)    Physical Exam  General: Elderly  female in no acute respiratory distress  HEENT: NCAT, PERRLA, EOMI bl, moist mucous membranes   Neck: Supple, nontender, no mass  Neurology: A&Ox3, nonfocal, CN II-XII grossly intact, sensation intact, no gait abnormalities   Respiratory: decreased breath sound bilaterally, no wheezing  CV: RRR, +S1/S2, no murmurs, rubs or gallops  Abdominal: Soft, NT, ND +BSx4  Extremities: No C/C/E, + peripheral pulses  MSK: Normal ROM, no joint erythema or warmth, no joint swelling   Skin: warm, dry, normal color, no rash or abnormal lesion      LABS:                                9.7    22.2  )-----------( 480      ( 02 May 2018 06:37 )             28.4     05-02    136  |  100  |  69<H>  ----------------------------<  265<H>  4.9   |  20<L>  |  4.50<H>    Ca    8.8      02 May 2018 06:37    TPro  7.3  /  Alb  3.1<L>  /  TBili  0.3  /  DBili  x   /  AST  18  /  ALT  20  /  AlkPhos  75  05-01    < from: CT Chest No Cont (05.01.18 @ 04:39) >  Small to moderate bilateral pleural effusions (right larger than the   left) with associated compressive atelectasis. Mild diffuse interstitial   pulmonary edema. INTERVAL HPI/OVERNIGHT EVENTS: Patient seen and examined at bedside. Patient says she is breathing better, she is no longer short of breath. Patient was dialyzed yesterday.    MEDICATIONS  (STANDING):  amLODIPine   Tablet 2.5 milliGRAM(s) Oral at bedtime  amLODIPine   Tablet 5 milliGRAM(s) Oral daily  dextrose 5%. 1000 milliLiter(s) (50 mL/Hr) IV Continuous <Continuous>  dextrose 50% Injectable 12.5 Gram(s) IV Push once  dextrose 50% Injectable 25 Gram(s) IV Push once  dextrose 50% Injectable 25 Gram(s) IV Push once  furosemide   Injectable 40 milliGRAM(s) IV Push two times a day  guaiFENesin   Syrup  (Sugar-Free) 100 milliGRAM(s) Oral every 6 hours  heparin  Injectable 5000 Unit(s) SubCutaneous every 12 hours  imipramine 50 milliGRAM(s) Oral daily  insulin glargine Injectable (LANTUS) 16 Unit(s) SubCutaneous at bedtime  insulin lispro (HumaLOG) corrective regimen sliding scale   SubCutaneous three times a day before meals  insulin lispro (HumaLOG) corrective regimen sliding scale   SubCutaneous at bedtime  melatonin 3 milliGRAM(s) Oral at bedtime  metoprolol succinate ER 50 milliGRAM(s) Oral daily  tiotropium 18 MICROgram(s) Capsule 1 Capsule(s) Inhalation daily    MEDICATIONS  (PRN):  acetaminophen   Tablet. 650 milliGRAM(s) Oral every 6 hours PRN Mild Pain (1 - 3)  ALBUTerol    0.083% 2.5 milliGRAM(s) Nebulizer every 6 hours PRN Shortness of Breath and/or Wheezing  dextrose Gel 1 Dose(s) Oral once PRN Blood Glucose LESS THAN 70 milliGRAM(s)/deciliter  glucagon  Injectable 1 milliGRAM(s) IntraMuscular once PRN Glucose LESS THAN 70 milligrams/deciliter  Allergies    latex (Unknown)  No Known Drug Allergies    Intolerances    Review of Systems:   Constitutional: (-) denies fever, chills, diaphoresis   HEENT: denies blurry vision, difficulty hearing  Respiratory: (-) admits SOB, ADAMSON, cough, sputum production  Cardiovascular: (-) denies CP, palpitations, (+) edema  Gastrointestinal: denies nausea, vomiting, diarrhea, constipation, abdominal pain, melena, hematochezia   Genitourinary: denies dysuria, frequency, urgency, hematuria   Skin/Breast: denies rash, itching  Musculoskeletal: denies myalgias, joint swelling, muscle weakness  Neurologic: denies headache, weakness, dizziness, paresthesias, numbness/tingling  Psychiatric: denies feeling anxious, depressed, suicidal, homicidal thoughts  Hematology/Oncology: denies bruising, tender or enlarged lymph nodes   ROS negative except as noted above    Vital Signs Last 24 Hrs  T(C): 36.9 (03 May 2018 04:43), Max: 37.4 (03 May 2018 00:03)  T(F): 98.4 (03 May 2018 04:43), Max: 99.3 (03 May 2018 00:03)  HR: 97 (03 May 2018 04:43) (93 - 102)  BP: 153/81 (03 May 2018 04:43) (144/78 - 171/-)  BP(mean): 92 (02 May 2018 18:05) (92 - 92)  RR: 20 (03 May 2018 04:43) (18 - 20)  SpO2: 94% (03 May 2018 04:43) (93% - 100%)    Physical Exam  General: Elderly  female in no acute respiratory distress  HEENT: NCAT, PERRLA, EOMI bl, moist mucous membranes   Neck: Supple, nontender, no mass  Neurology: A&Ox3, nonfocal, CN II-XII grossly intact, sensation intact, no gait abnormalities   Respiratory: decreased breath sound bilaterally, no wheezing  CV: RRR, +S1/S2, no murmurs, rubs or gallops  Abdominal: Soft, NT, ND +BSx4  Extremities: No C/C/E, + peripheral pulses  MSK: Normal ROM, no joint erythema or warmth, no joint swelling   Skin: warm, dry, normal color, no rash or abnormal lesion      LABS:                                  9.9    18.3  )-----------( 365      ( 03 May 2018 07:37 )             27.9     05-03    138  |  100  |  52<H>  ----------------------------<  184<H>  3.9   |  26  |  3.80<H>    Ca    8.4<L>      03 May 2018 07:37      < from: CT Chest No Cont (05.01.18 @ 04:39) >  Small to moderate bilateral pleural effusions (right larger than the   left) with associated compressive atelectasis. Mild diffuse interstitial   pulmonary edema.

## 2018-05-03 NOTE — PROGRESS NOTE ADULT - PROBLEM SELECTOR PLAN 1
volume overload  copd  on NEBS prn and Spiriva  o2 support  keep sat > 88 pct  on DIURESIS as per renal and cardio  HD as per renal  serial labs  Wojciech in - RIJ  serial exam  serial labs  I and O  replete lytes  assist with ADL  monitor sat on exertion and keep sat > 88 pct

## 2018-05-03 NOTE — PROGRESS NOTE ADULT - PROBLEM SELECTOR PLAN 6
-Chronic.   -Increase Lantus to 18 units, fingersticks have been elevated  - Accuchecks, Hypoglycemic protocol   - HgbA1c: 8.5  - Diet: Consistent carb

## 2018-05-03 NOTE — PROGRESS NOTE ADULT - PROBLEM SELECTOR PLAN 4
Normal MCV, iron panel revealed mildly elevated ferritin with low normal iron. Patient on Epogen   Continue to monitor

## 2018-05-03 NOTE — CONSULT NOTE ADULT - SUBJECTIVE AND OBJECTIVE BOX
HPI:  Patient is a 66 y/o  female PMHx Type 2 Diabetes on lantus, HTN, CAD s/p stent (), Depression, Anxiety, former smoker brought in by EMS for shortness of breath. The sob has progressed over 1 week. The sob is associated with a productive cough with clear sputum. The sob/cough are worse with activity and relieved with rest. She admits to swelling in the feet that has increased this week. She denies recent travel or sick contacts. She received a flu vaccine this year. She denies fever, chills, cp, palpitations, N/V/C/D, abdominal pain.     In ED afebrile, tachycardic, hypertensive, SpO2 81% on RA, 94% on 2LNC. Labs significant for a WBC 10.8, BUN 54, Cr 4.1, Glucose 266, Lactate 0.9. CT chest small to moderate b/l pleural effusions, right > left. Meds: Duonebs, Solumedrol, (01 May 2018 05:21)      PAST MEDICAL & SURGICAL HISTORY:  h/o Smoking: quitted 3/2012  Murmur, cardiac  PAD (peripheral artery disease)  h/o Hepatitis A 1969: currently resolved, no symptoms  CAD (coronary artery disease)  h/o Myocardial infarct   Depression  h/o Anxiety attack  HTN (hypertension)  Diabetes mellitus II  s/p surgical removal of benign Skin lesion epigastric area  s/p Ovarian cyst removal  coronary stent       Antimicrobials      Immunological      Other  acetaminophen   Tablet. 650 milliGRAM(s) Oral every 6 hours PRN  ALBUTerol    0.083% 2.5 milliGRAM(s) Nebulizer every 6 hours PRN  amLODIPine   Tablet 2.5 milliGRAM(s) Oral at bedtime  amLODIPine   Tablet 5 milliGRAM(s) Oral daily  dextrose 5%. 1000 milliLiter(s) IV Continuous <Continuous>  dextrose 50% Injectable 12.5 Gram(s) IV Push once  dextrose 50% Injectable 25 Gram(s) IV Push once  dextrose 50% Injectable 25 Gram(s) IV Push once  dextrose Gel 1 Dose(s) Oral once PRN  furosemide   Injectable 40 milliGRAM(s) IV Push two times a day  glucagon  Injectable 1 milliGRAM(s) IntraMuscular once PRN  guaiFENesin   Syrup  (Sugar-Free) 100 milliGRAM(s) Oral every 6 hours  heparin  Injectable 5000 Unit(s) SubCutaneous every 12 hours  imipramine 50 milliGRAM(s) Oral daily  insulin glargine Injectable (LANTUS) 18 Unit(s) SubCutaneous at bedtime  insulin lispro (HumaLOG) corrective regimen sliding scale   SubCutaneous three times a day before meals  insulin lispro (HumaLOG) corrective regimen sliding scale   SubCutaneous at bedtime  melatonin 3 milliGRAM(s) Oral at bedtime  metoprolol succinate ER 50 milliGRAM(s) Oral daily  tiotropium 18 MICROgram(s) Capsule 1 Capsule(s) Inhalation daily      Allergies    latex (Unknown)  No Known Drug Allergies    Intolerances        SOCIAL HISTORY:    FAMILY HISTORY:  No pertinent family history in first degree relatives      ROS:    EYES:  Negative  blurry vision or double vision  GASTROINTESTINAL:  Negative for nausea, vomiting, diarrhea  -otherwise negative except for subjective    Vital Signs Last 24 Hrs  T(C): 37.1 (03 May 2018 08:07), Max: 37.4 (03 May 2018 00:03)  T(F): 98.8 (03 May 2018 08:07), Max: 99.3 (03 May 2018 00:03)  HR: 55 (03 May 2018 08:07) (55 - 102)  BP: 134/77 (03 May 2018 08:07) (134/77 - 171/-)  BP(mean): 92 (02 May 2018 18:05) (92 - 92)  RR: 18 (03 May 2018 08:07) (18 - 20)  SpO2: 95% (03 May 2018 08:07) (93% - 100%)    PE:  WDWN in no distress  HEENT:  NC, PERRL, sclerae anicteric, conjunctivae clear, EOMI.  Sinuses nontender, no nasal exudate.  No buccal or pharyngeal lesions, erythema or exudate  Neck:  Supple, no adenopathy  Lungs:  No accessory muscle use, bilaterally clear to auscultation  Cor:  RRR, S1, S2, no murmur appreciated  Abd:  Symmetric, normoactive BS.  Soft, nontender, no masses, guarding or rebound.  Liver and spleen not enlarged  Extrem:  No cyanosis or edema  Skin:  No rashes.  Neuro: grossly intact  Musc: moving all limbs freely, no focal deficits    LABS:                        9.9    18.3  )-----------( 365      ( 03 May 2018 07:37 )             27.9       WBC Count: 18.3 K/uL (18 @ 07:37)  WBC Count: 21.4 K/uL (18 @ 22:50)  WBC Count: 22.2 K/uL (18 @ 06:37)  < from: Xray Chest 1 View- PORTABLE-Urgent (18 @ 10:51) >    EXAM:  XR CHEST PORTABLE URGENT 1V                            PROCEDURE DATE:  2018          INTERPRETATION:  Follow-up.    AP chest. Prior dated 2018.  Right dialysis catheter reidentified in situ. No change heart   mediastinum. No significant change in bibasilar airspace disease and   effusions. No pneumothorax or other new abnormality.    Impression:  Essentially stable exam.    < end of copied text >  WBC Count: 10.8 K/uL (18 @ 03:45)          138  |  100  |  52<H>  ----------------------------<  184<H>  3.9   |  26  |  3.80<H>    Ca    8.4<L>      03 May 2018 07:37        Creatinine, Serum: 3.80 mg/dL (18 @ 07:37)  Creatinine, Serum: 4.50 mg/dL (18 @ 06:37)  Creatinine, Serum: 4.10 mg/dL (18 @ 03:45)      Urinalysis Basic - ( 02 May 2018 22:19 )    Color: Yellow / Appearance: Clear / S.010 / pH: x  Gluc: x / Ketone: Negative  / Bili: Negative / Urobili: Negative   Blood: x / Protein: 150 mg/dL / Nitrite: Negative   Leuk Esterase: Negative / RBC: 0-2 /HPF / WBC 3-5   Sq Epi: x / Non Sq Epi: Occasional / Bacteria: Negative            MICROBIOLOGY:      RADIOLOGY & ADDITIONAL STUDIES:    --< from: Xray Chest 1 View AP/PA (18 @ 15:07) >  EXAM:  XR CHEST AP OR PA 1V                            PROCEDURE DATE:  2018          INTERPRETATION:  Confirm catheter placement.    AP chest. Prior 2018.    There has been interval introduction of a right internal jugular line tip   in right atrium. No pneumothorax. No gross change heart and lungs.

## 2018-05-03 NOTE — CONSULT NOTE ADULT - PROBLEM SELECTOR RECOMMENDATION 4
from serologies immune from natural exposure    Thank you for consulting us and involving us in the management of this most interesting and challenging case.     Please Call with any further questions

## 2018-05-03 NOTE — PROGRESS NOTE ADULT - PROBLEM SELECTOR PLAN 3
-Patient has leukocytosis but  has been afebrile and with no urinary symptoms or pneumonia  -procalcitonin was elevated (1.93), UA negative and blood cultures negative  -Repeat CXR was unchanged  -ID consulted: Dr. Shelby, suggest leukocytosis is secondary to steroid use. Patient did receive high dose solumedrol in ED. Will continue to monitor white count

## 2018-05-03 NOTE — DISCHARGE NOTE ADULT - NS AS DC FOLLOWUP STROKE INST
Heart Failure/CHF, Acute Renal Failure, Anemia, Diabetes, Dialysis CHF, Acute Renal Failure, Anemia, Diabetes, Dialysis, metoprolol,/Heart Failure

## 2018-05-03 NOTE — CONSULT NOTE ADULT - PROBLEM SELECTOR PROBLEM 2
Anemia in chronic kidney disease, unspecified CKD stage
Leukocytosis
Electrodesiccation Text: The wound bed was treated with electrodesiccation after the biopsy was performed.

## 2018-05-03 NOTE — DISCHARGE NOTE ADULT - NSTOBACCOREFERRAL_GEN_A_CS
pt stated she stopped smoking 1 year ago, refused referral to or education material on smoking cessation at this time/Patient declined information

## 2018-05-03 NOTE — PROGRESS NOTE ADULT - PROBLEM SELECTOR PLAN 7
-Blood pressure continues to be elevated on amlodipine, lasix, and metoprolol.  -May need to make adjustments to medications

## 2018-05-03 NOTE — PROGRESS NOTE ADULT - PROBLEM SELECTOR PLAN 2
-DAVIS on CKD  -Patient dialyzed yesterday, has RIJ catheter   - HOLD NSAIDs, ACE, ARBs   - trend BUN/Cr  - f/u Nephro Dr. Reina

## 2018-05-03 NOTE — PROGRESS NOTE ADULT - ASSESSMENT
68 y/o  female PMHx Type 2 Diabetes on lantus, HTN, CAD s/p stent (2007), Depression, Anxiety, CKD, former smoker presenting with sob.     - SOB is relieved with urgent HD yesterday, although still with some degree of fluid overload.  - HD per renal.  May have another session today  - Likely multifactorial 2/2 to acute CHF with some component possible of underlying lung disease copd/pna  - Cont 40mg IV BID and monitor for clinical improvement.   Patient is still urinating.    - Please continue to maintain strict I/Os, monitor daily weights, Cr, and K.   - Continue supplemental oxygen as needed  - echo yesterday showed mild segmental LVSD, EF 30%, elevated filling pressures, MAC, moderate MR, and B/L leural effusions.  - Patient can decompensate quickly; would avoid IVF  - BP elevated in ED. Now improving. Continue amlodipine 2.5 mg qhs, amlodipine 5mg qd, Toprol 50mg qd  - Continue asa 81mg qd  - Fluid restriction  - Other cardiovascular workup will depend on clinical course.  - All other workup per primary team  - Will follow     Mary Gross NP  [facility] 66 y/o  female PMHx Type 2 Diabetes on lantus, HTN, CAD s/p stent (2007), Depression, Anxiety, CKD, former smoker presenting with sob.     - SOB is relieved with urgent HD yesterday, although still with some degree of fluid overload.  - HD per renal.  May have another session today  - Likely multifactorial 2/2 to acute CHF with some component possible of underlying lung disease copd/pna  - Cont 40mg IV BID and monitor for clinical improvement.   Patient is still urinating.    - Please continue to maintain strict I/Os, monitor daily weights, Cr, and K.   - Continue supplemental oxygen as needed  - echo yesterday showed moderate segmental LVSD, EF 30%, elevated filling pressures, MAC, moderate MR, and B/L pleural effusions.  - Patient can decompensate quickly; would avoid IVF  - BP elevated in ED. Now improving. Continue amlodipine 2.5 mg qhs, amlodipine 5mg qd, Toprol 50mg qd  - Continue asa 81mg qd  - Fluid restriction  - Other cardiovascular workup will depend on clinical course.  - All other workup per primary team  - Will follow     Mary Gross NP  C[facility]iology

## 2018-05-03 NOTE — DISCHARGE NOTE ADULT - CARE PLAN
Principal Discharge DX:	CHF (congestive heart failure)  Secondary Diagnosis:	CAD (coronary artery disease)  Secondary Diagnosis:	Acute renal failure Principal Discharge DX:	CHF (congestive heart failure)  Goal:	Resolution of Sx  Assessment and plan of treatment:	Continue taking lasix  Follow up with your cardiologist within 1 week of discharge.  Secondary Diagnosis:	Acute renal failure  Assessment and plan of treatment:	You will need to continue with Dialysis  Follow up with you nephrologist within 1 week of discharge.  Secondary Diagnosis:	Anemia  Assessment and plan of treatment:	Continue taking epogen  Secondary Diagnosis:	Hypertension  Assessment and plan of treatment:	Continue amlodipine and metoprolol.  Secondary Diagnosis:	Diabetes  Assessment and plan of treatment:	Continue insulin. Principal Discharge DX:	CHF (congestive heart failure)  Goal:	Resolution of Sx, Acute on Chronic systolic CHF, EF 30 %  Assessment and plan of treatment:	Follow up with your cardiologist within 1 week of discharge.  Secondary Diagnosis:	Acute renal failure  Assessment and plan of treatment:	You will need to continue with Dialysis  Follow up with you nephrologist within 1 week of discharge.  Secondary Diagnosis:	Anemia  Goal:	chronic, 2/2 CKD.  Assessment and plan of treatment:	Continue taking epogen  Secondary Diagnosis:	Hypertension  Assessment and plan of treatment:	Continue amlodipine and metoprolol.  Secondary Diagnosis:	Diabetes  Assessment and plan of treatment:	Continue insulin and other home regimen.

## 2018-05-03 NOTE — DISCHARGE NOTE ADULT - MEDICATION SUMMARY - MEDICATIONS TO TAKE
I will START or STAY ON the medications listed below when I get home from the hospital:    Aspirin Enteric Coated 81 mg oral delayed release tablet  -- 1 tab(s) by mouth once a day  -- Indication: For CHF (congestive heart failure)    imipramine 50 mg oral tablet  -- 1 tab(s) by mouth once a day  -- Indication: For Depression    Trulicity Pen 0.75 mg/0.5 mL subcutaneous solution  -- subcutaneous once a week  -- Indication: For DM     Lantus 100 units/mL subcutaneous solution  -- 14 unit(s) subcutaneous once (at bedtime)  -- Indication: For DM     Metoprolol Succinate ER 50 mg oral tablet, extended release  -- 1.5 tab(s) by mouth once a day   -- It is very important that you take or use this exactly as directed.  Do not skip doses or discontinue unless directed by your doctor.  May cause drowsiness.  Alcohol may intensify this effect.  Use care when operating dangerous machinery.  Some non-prescription drugs may aggravate your condition.  Read all labels carefully.  If a warning appears, check with your doctor before taking.  Swallow whole.  Do not crush.  Take with food or milk.  This drug may impair the ability to drive or operate machinery.  Use care until you become familiar with its effects.    -- Indication: For Hypertension    tiotropium 18 mcg inhalation capsule  -- 1 cap(s) inhaled once a day  -- Indication: For COPD (chronic obstructive pulmonary disease)    amLODIPine 5 mg oral tablet  -- 1 tab(s) by mouth once a day  -- Indication: For Hypertension    amLODIPine 2.5 mg oral tablet  -- 1 tab(s) by mouth once a day (at bedtime)  -- Indication: For Hypertension

## 2018-05-03 NOTE — CONSULT NOTE ADULT - ASSESSMENT
68 y/o  female PMHx Type 2 Diabetes on lantus, HTN, CAD s/p stent (2007), Depression, Anxiety, former smoker brought in by EMS for shortness of breath with no fever, no wbc and no concern for pna on exam or imaging.

## 2018-05-03 NOTE — PROGRESS NOTE ADULT - PROBLEM SELECTOR PLAN 1
-Patient has no prior history of HF, however pro BNP ([phone]).   -Continue on Lasix 40mg IV BID. Received additional dose of Lasix yesterday  -echo yesterday showed moderate segmental LVSD, EF 30%, elevated filling pressures, MAC, moderate MR, and B/L pleural effusions.  -Patient can decompensate quickly; will avoid IVF  -Cardio (Dr. Parker) following  -Patient follows Dr. Flo Rios (cardio)  -Strict I and Os

## 2018-05-04 ENCOUNTER — RESULT REVIEW (OUTPATIENT)
Age: 68
End: 2018-05-04

## 2018-05-04 LAB
ANION GAP SERPL CALC-SCNC: 10 MMOL/L — SIGNIFICANT CHANGE UP (ref 5–17)
BUN SERPL-MCNC: 41 MG/DL — HIGH (ref 7–23)
CALCIUM SERPL-MCNC: 8.4 MG/DL — LOW (ref 8.5–10.1)
CHLORIDE SERPL-SCNC: 99 MMOL/L — SIGNIFICANT CHANGE UP (ref 96–108)
CO2 SERPL-SCNC: 29 MMOL/L — SIGNIFICANT CHANGE UP (ref 22–31)
CREAT SERPL-MCNC: 3.7 MG/DL — HIGH (ref 0.5–1.3)
CULTURE RESULTS: SIGNIFICANT CHANGE UP
GLUCOSE SERPL-MCNC: 204 MG/DL — HIGH (ref 70–99)
HCT VFR BLD CALC: 30.4 % — LOW (ref 34.5–45)
HGB BLD-MCNC: 10.2 G/DL — LOW (ref 11.5–15.5)
MCHC RBC-ENTMCNC: 28.9 PG — SIGNIFICANT CHANGE UP (ref 27–34)
MCHC RBC-ENTMCNC: 33.5 GM/DL — SIGNIFICANT CHANGE UP (ref 32–36)
MCV RBC AUTO: 86.2 FL — SIGNIFICANT CHANGE UP (ref 80–100)
PLATELET # BLD AUTO: 372 K/UL — SIGNIFICANT CHANGE UP (ref 150–400)
POTASSIUM SERPL-MCNC: 3.7 MMOL/L — SIGNIFICANT CHANGE UP (ref 3.5–5.3)
POTASSIUM SERPL-SCNC: 3.7 MMOL/L — SIGNIFICANT CHANGE UP (ref 3.5–5.3)
RBC # BLD: 3.53 M/UL — LOW (ref 3.8–5.2)
RBC # FLD: 14.7 % — HIGH (ref 10.3–14.5)
SODIUM SERPL-SCNC: 138 MMOL/L — SIGNIFICANT CHANGE UP (ref 135–145)
SPECIMEN SOURCE: SIGNIFICANT CHANGE UP
WBC # BLD: 17.1 K/UL — HIGH (ref 3.8–10.5)
WBC # FLD AUTO: 17.1 K/UL — HIGH (ref 3.8–10.5)

## 2018-05-04 PROCEDURE — 99233 SBSQ HOSP IP/OBS HIGH 50: CPT | Mod: GC

## 2018-05-04 PROCEDURE — 88300 SURGICAL PATH GROSS: CPT | Mod: 26

## 2018-05-04 PROCEDURE — 71045 X-RAY EXAM CHEST 1 VIEW: CPT | Mod: 26

## 2018-05-04 PROCEDURE — 36558 INSERT TUNNELED CV CATH: CPT | Mod: RT

## 2018-05-04 PROCEDURE — 99232 SBSQ HOSP IP/OBS MODERATE 35: CPT

## 2018-05-04 RX ORDER — ONDANSETRON 8 MG/1
4 TABLET, FILM COATED ORAL ONCE
Qty: 0 | Refills: 0 | Status: DISCONTINUED | OUTPATIENT
Start: 2018-05-04 | End: 2018-05-04

## 2018-05-04 RX ORDER — DEXTROSE 50 % IN WATER 50 %
1 SYRINGE (ML) INTRAVENOUS ONCE
Qty: 0 | Refills: 0 | Status: DISCONTINUED | OUTPATIENT
Start: 2018-05-04 | End: 2018-05-06

## 2018-05-04 RX ORDER — AMLODIPINE BESYLATE 2.5 MG/1
5 TABLET ORAL DAILY
Qty: 0 | Refills: 0 | Status: DISCONTINUED | OUTPATIENT
Start: 2018-05-04 | End: 2018-05-06

## 2018-05-04 RX ORDER — DEXTROSE 50 % IN WATER 50 %
12.5 SYRINGE (ML) INTRAVENOUS ONCE
Qty: 0 | Refills: 0 | Status: DISCONTINUED | OUTPATIENT
Start: 2018-05-04 | End: 2018-05-06

## 2018-05-04 RX ORDER — OXYCODONE HYDROCHLORIDE 5 MG/1
5 TABLET ORAL EVERY 4 HOURS
Qty: 0 | Refills: 0 | Status: DISCONTINUED | OUTPATIENT
Start: 2018-05-04 | End: 2018-05-04

## 2018-05-04 RX ORDER — INSULIN LISPRO 100/ML
VIAL (ML) SUBCUTANEOUS
Qty: 0 | Refills: 0 | Status: DISCONTINUED | OUTPATIENT
Start: 2018-05-04 | End: 2018-05-06

## 2018-05-04 RX ORDER — DEXTROSE 50 % IN WATER 50 %
25 SYRINGE (ML) INTRAVENOUS ONCE
Qty: 0 | Refills: 0 | Status: DISCONTINUED | OUTPATIENT
Start: 2018-05-04 | End: 2018-05-06

## 2018-05-04 RX ORDER — LANOLIN ALCOHOL/MO/W.PET/CERES
3 CREAM (GRAM) TOPICAL AT BEDTIME
Qty: 0 | Refills: 0 | Status: DISCONTINUED | OUTPATIENT
Start: 2018-05-04 | End: 2018-05-06

## 2018-05-04 RX ORDER — GLUCAGON INJECTION, SOLUTION 0.5 MG/.1ML
1 INJECTION, SOLUTION SUBCUTANEOUS ONCE
Qty: 0 | Refills: 0 | Status: DISCONTINUED | OUTPATIENT
Start: 2018-05-04 | End: 2018-05-06

## 2018-05-04 RX ORDER — HYDROMORPHONE HYDROCHLORIDE 2 MG/ML
0.5 INJECTION INTRAMUSCULAR; INTRAVENOUS; SUBCUTANEOUS
Qty: 0 | Refills: 0 | Status: DISCONTINUED | OUTPATIENT
Start: 2018-05-04 | End: 2018-05-04

## 2018-05-04 RX ORDER — AMLODIPINE BESYLATE 2.5 MG/1
2.5 TABLET ORAL AT BEDTIME
Qty: 0 | Refills: 0 | Status: DISCONTINUED | OUTPATIENT
Start: 2018-05-04 | End: 2018-05-06

## 2018-05-04 RX ORDER — TIOTROPIUM BROMIDE 18 UG/1
1 CAPSULE ORAL; RESPIRATORY (INHALATION) DAILY
Qty: 0 | Refills: 0 | Status: DISCONTINUED | OUTPATIENT
Start: 2018-05-04 | End: 2018-05-06

## 2018-05-04 RX ORDER — SODIUM CHLORIDE 9 MG/ML
1000 INJECTION, SOLUTION INTRAVENOUS
Qty: 0 | Refills: 0 | Status: DISCONTINUED | OUTPATIENT
Start: 2018-05-04 | End: 2018-05-04

## 2018-05-04 RX ORDER — SODIUM CHLORIDE 9 MG/ML
1000 INJECTION, SOLUTION INTRAVENOUS
Qty: 0 | Refills: 0 | Status: DISCONTINUED | OUTPATIENT
Start: 2018-05-04 | End: 2018-05-06

## 2018-05-04 RX ORDER — SODIUM CHLORIDE 9 MG/ML
1000 INJECTION INTRAMUSCULAR; INTRAVENOUS; SUBCUTANEOUS
Qty: 0 | Refills: 0 | Status: DISCONTINUED | OUTPATIENT
Start: 2018-05-04 | End: 2018-05-04

## 2018-05-04 RX ORDER — IPRATROPIUM/ALBUTEROL SULFATE 18-103MCG
3 AEROSOL WITH ADAPTER (GRAM) INHALATION EVERY 6 HOURS
Qty: 0 | Refills: 0 | Status: DISCONTINUED | OUTPATIENT
Start: 2018-05-04 | End: 2018-05-06

## 2018-05-04 RX ORDER — CEFAZOLIN SODIUM 1 G
2000 VIAL (EA) INJECTION ONCE
Qty: 0 | Refills: 0 | Status: DISCONTINUED | OUTPATIENT
Start: 2018-05-04 | End: 2018-05-04

## 2018-05-04 RX ORDER — FUROSEMIDE 40 MG
40 TABLET ORAL
Qty: 0 | Refills: 0 | Status: DISCONTINUED | OUTPATIENT
Start: 2018-05-04 | End: 2018-05-06

## 2018-05-04 RX ORDER — OXYCODONE HYDROCHLORIDE 5 MG/1
10 TABLET ORAL EVERY 6 HOURS
Qty: 0 | Refills: 0 | Status: DISCONTINUED | OUTPATIENT
Start: 2018-05-04 | End: 2018-05-04

## 2018-05-04 RX ORDER — INSULIN GLARGINE 100 [IU]/ML
18 INJECTION, SOLUTION SUBCUTANEOUS AT BEDTIME
Qty: 0 | Refills: 0 | Status: DISCONTINUED | OUTPATIENT
Start: 2018-05-04 | End: 2018-05-05

## 2018-05-04 RX ORDER — HEPARIN SODIUM 5000 [USP'U]/ML
5000 INJECTION INTRAVENOUS; SUBCUTANEOUS EVERY 12 HOURS
Qty: 0 | Refills: 0 | Status: DISCONTINUED | OUTPATIENT
Start: 2018-05-04 | End: 2018-05-06

## 2018-05-04 RX ORDER — METOPROLOL TARTRATE 50 MG
50 TABLET ORAL DAILY
Qty: 0 | Refills: 0 | Status: DISCONTINUED | OUTPATIENT
Start: 2018-05-05 | End: 2018-05-06

## 2018-05-04 RX ADMIN — Medication 3 MILLIGRAM(S): at 22:31

## 2018-05-04 RX ADMIN — Medication 40 MILLIGRAM(S): at 05:06

## 2018-05-04 RX ADMIN — Medication 3 MILLILITER(S): at 20:08

## 2018-05-04 RX ADMIN — SODIUM CHLORIDE 30 MILLILITER(S): 9 INJECTION INTRAMUSCULAR; INTRAVENOUS; SUBCUTANEOUS at 11:39

## 2018-05-04 RX ADMIN — Medication 50 MILLIGRAM(S): at 05:06

## 2018-05-04 RX ADMIN — Medication 40 MILLIGRAM(S): at 20:17

## 2018-05-04 RX ADMIN — INSULIN GLARGINE 18 UNIT(S): 100 INJECTION, SOLUTION SUBCUTANEOUS at 21:25

## 2018-05-04 RX ADMIN — AMLODIPINE BESYLATE 2.5 MILLIGRAM(S): 2.5 TABLET ORAL at 21:25

## 2018-05-04 RX ADMIN — Medication 6: at 17:31

## 2018-05-04 RX ADMIN — AMLODIPINE BESYLATE 5 MILLIGRAM(S): 2.5 TABLET ORAL at 05:06

## 2018-05-04 RX ADMIN — Medication 3 MILLILITER(S): at 13:55

## 2018-05-04 NOTE — PROGRESS NOTE ADULT - PROBLEM SELECTOR PLAN 6
-Chronic.   -Increased Lantus to 18 units, fingersticks have been elevated  - Accuchecks, Hypoglycemic protocol   - HgbA1c: 8.5  - Diet: Consistent carb

## 2018-05-04 NOTE — PROGRESS NOTE ADULT - PROBLEM SELECTOR PLAN 1
NEBS prn  o2 support  keep sat > 88 pct  oral and skin care  cont Spiriva  cont ESRD management with HD  renal follow up and ID follow up  am WBC pending, pt is off steroids since May 1  increase activity as tolerated  will follow  Smoking cess ed and counseling provided

## 2018-05-04 NOTE — CONSULT NOTE ADULT - SUBJECTIVE AND OBJECTIVE BOX
Vascular Attending:  Dr. Santana      HPI:  Patient is a 68 y/o  female PMHx Type 2 Diabetes on lantus, HTN, CAD s/p stent (), Depression, Anxiety, former smoker brought in by EMS for shortness of breath. The sob has progressed over 1 week. The sob is associated with a productive cough with clear sputum. The sob/cough are worse with activity and relieved with rest. She admits to swelling in the feet that has increased this week. She denies recent travel or sick contacts. She received a flu vaccine this year. She denies fever, chills, cp, palpitations, N/V/C/D, abdominal pain.     In ED afebrile, tachycardic, hypertensive, SpO2 81% on RA, 94% on 2LNC. Labs significant for a WBC 10.8, BUN 54, Cr 4.1, Glucose 266, Lactate 0.9. CT chest small to moderate b/l pleural effusions, right > left. Meds: Duonebs, Solumedrol, (01 May 2018 05:21)      PAST MEDICAL & SURGICAL HISTORY:  h/o Smoking: quitted 3/2012  Murmur, cardiac  PAD (peripheral artery disease)  h/o Hepatitis A 1969: currently resolved, no symptoms  CAD (coronary artery disease)  h/o Myocardial infarct   Depression  h/o Anxiety attack  HTN (hypertension)  Diabetes mellitus II  s/p surgical removal of benign Skin lesion epigastric area  s/p Ovarian cyst removal  coronary stent       MEDICATIONS  (STANDING):  amLODIPine   Tablet 2.5 milliGRAM(s) Oral at bedtime  amLODIPine   Tablet 5 milliGRAM(s) Oral daily  dextrose 5%. 1000 milliLiter(s) (50 mL/Hr) IV Continuous <Continuous>  dextrose 50% Injectable 12.5 Gram(s) IV Push once  dextrose 50% Injectable 25 Gram(s) IV Push once  dextrose 50% Injectable 25 Gram(s) IV Push once  furosemide   Injectable 40 milliGRAM(s) IV Push two times a day  guaiFENesin   Syrup  (Sugar-Free) 100 milliGRAM(s) Oral every 6 hours  heparin  Injectable 5000 Unit(s) SubCutaneous every 12 hours  imipramine 50 milliGRAM(s) Oral daily  insulin glargine Injectable (LANTUS) 18 Unit(s) SubCutaneous at bedtime  insulin lispro (HumaLOG) corrective regimen sliding scale   SubCutaneous three times a day before meals  insulin lispro (HumaLOG) corrective regimen sliding scale   SubCutaneous at bedtime  melatonin 3 milliGRAM(s) Oral at bedtime  metoprolol succinate ER 50 milliGRAM(s) Oral daily  tiotropium 18 MICROgram(s) Capsule 1 Capsule(s) Inhalation daily    MEDICATIONS  (PRN):  acetaminophen   Tablet. 650 milliGRAM(s) Oral every 6 hours PRN Mild Pain (1 - 3)  ALBUTerol    0.083% 2.5 milliGRAM(s) Nebulizer every 6 hours PRN Shortness of Breath and/or Wheezing  dextrose Gel 1 Dose(s) Oral once PRN Blood Glucose LESS THAN 70 milliGRAM(s)/deciliter  glucagon  Injectable 1 milliGRAM(s) IntraMuscular once PRN Glucose LESS THAN 70 milligrams/deciliter      Allergies    latex (Unknown)  No Known Drug Allergies    Intolerances        FAMILY HISTORY:  No pertinent family history in first degree relatives      SOCIAL HISTORY:    ROS: 10-system review is otherwise negative except HPI above.      Vital Signs Last 24 Hrs  T(C): 37 (04 May 2018 04:36), Max: 37.2 (03 May 2018 23:20)  T(F): 98.6 (04 May 2018 04:36), Max: 99 (03 May 2018 23:20)  HR: 100 (04 May 2018 04:36) (95 - 106)  BP: 119/82 (04 May 2018 04:36) (119/82 - 169/88)  BP(mean): --  RR: 17 (04 May 2018 04:36) (17 - 20)  SpO2: 96% (04 May 2018 04:36) (92% - 96%)    General:  NAD  Neuro: 5/5 motor function in all 4 extremities, sensation intact; AAOX3  HEENT:  no facial droop; no ptosis or facial edema  Neck:  Supple, no carotid bruits  Respiratory: CTA B/L  CV: RRR, S1S2, no murmur  Abdominal: Soft, NT, ND no palpable mass, no pulsatile mass, no bruits  Ext: No edema, pink, well-perfused; capillary refill <2sec bilaterally  Musc: FROM all 4 extremity  Incisions: intact, no erythema or drainage  Vasc:   Right    Axillary  2+ [ ]  1+ [ ] doppler [ ]                  Left   Axillary  2+ [ ]  1+ [ ] doppler [ ]               Brachial  2+ [ ]  1+ [ ] doppler [ ]                           Brachial  2+ [ ]  1+ [ ] doppler [ ]               Radial  2+ [ ]  1+ [ ] doppler [ ]                              Radial 2+ [ ]  1+ [ ] doppler [ ]               Ulnar  2+ [ ]  1+ [ ] doppler [ ]                               Ulnar  2+ [ ]  1+ [ ] doppler [ ]               Femoral  2+ [ ]  1+ [ ] doppler [ ]                          Femoral  2+ [ ]  1+ [ ] doppler [ ]               Popliteal  2+ [ ]  1+ [ ] doppler [ ]                         Popliteal  2+ [ ]  1+ [ ] doppler [ ]               Dorsalis Pedis  2+ [ ]  1+ [ ] doppler [ ]                Dorsalis Pedis  2+ [ ]  1+ [ ] doppler [ ]               Posterior Tibial  2+ [ ]  1+ [ ] doppler [ ]              Posterior Tibial  2+ [ ]  1+ [ ] doppler [ ]      LABS:                        10.2   17.1  )-----------( 372      ( 04 May 2018 06:41 )             30.4     05-04    138  |  99  |  41<H>  ----------------------------<  204<H>  3.7   |  29  |  3.70<H>    Ca    8.4<L>      04 May 2018 06:41        Urinalysis Basic - ( 02 May 2018 22:19 )    Color: Yellow / Appearance: Clear / S.010 / pH: x  Gluc: x / Ketone: Negative  / Bili: Negative / Urobili: Negative   Blood: x / Protein: 150 mg/dL / Nitrite: Negative   Leuk Esterase: Negative / RBC: 0-2 /HPF / WBC 3-5   Sq Epi: x / Non Sq Epi: Occasional / Bacteria: Negative        RADIOLOGY & ADDITIONAL STUDIES    Impression and Plan:

## 2018-05-04 NOTE — PROGRESS NOTE ADULT - SUBJECTIVE AND OBJECTIVE BOX
[facility] -- Lorraine Gruber, Laron, Dee, Joseph Carson Savella  Office # [phone]      Follow Up:  Dyspnea    Subjective/Observations: Pt seen and examined.  Lying flat with NAD.  Pt denies cp, sob or palpitations.  No signs of orthopnea or PND.  For Perma-Cath placement today.        REVIEW OF SYSTEMS: All other review of systems is negative unless indicated above    PAST MEDICAL & SURGICAL HISTORY:  h/o Smoking: quitted 3/2012  Murmur, cardiac  PAD (peripheral artery disease)  h/o Hepatitis A 1969: currently resolved, no symptoms  CAD (coronary artery disease)  h/o Myocardial infarct 2007  Depression  h/o Anxiety attack  HTN (hypertension)  Diabetes mellitus II  s/p surgical removal of benign Skin lesion epigastric area  s/p Ovarian cyst removal  coronary stent 2007      MEDICATIONS  (STANDING):  lactated ringers. 1000 milliLiter(s) (70 mL/Hr) IV Continuous <Continuous>  sodium chloride 0.9%. 1000 milliLiter(s) (30 mL/Hr) IV Continuous <Continuous>    MEDICATIONS  (PRN):  HYDROmorphone  Injectable 0.5 milliGRAM(s) IV Push every 10 minutes PRN Moderate Pain (4 - 6)  HYDROmorphone  Injectable 0.5 milliGRAM(s) IV Push every 10 minutes PRN Severe Pain (7 - 10)  ondansetron Injectable 4 milliGRAM(s) IV Push once PRN Nausea and/or Vomiting  oxyCODONE    IR 5 milliGRAM(s) Oral every 4 hours PRN For moderate pain  oxyCODONE    IR 10 milliGRAM(s) Oral every 6 hours PRN For severe pain      Allergies    latex (Unknown)  No Known Drug Allergies    Intolerances            Vital Signs Last 24 Hrs  T(C): 36.9 (04 May 2018 12:02), Max: 37.3 (04 May 2018 09:22)  T(F): 98.4 (04 May 2018 12:02), Max: 99.1 (04 May 2018 09:22)  HR: 90 (04 May 2018 12:02) (88 - 106)  BP: 167/85 (04 May 2018 12:02) (119/82 - 173/85)  BP(mean): --  RR: 16 (04 May 2018 12:02) (16 - 20)  SpO2: 96% (04 May 2018 12:02) (90% - 98%)    I&O's Summary    03 May 2018 07:01  -  04 May 2018 07:00  --------------------------------------------------------  IN: 120 mL / OUT: 1000 mL / NET: -880 mL          PHYSICAL EXAM:  TELE: SR with HR to 140s briefly at 9pm last night.  No further events noted overnight.    Constitutional: NAD, awake and alert, well-developed  HEENT: Moist Mucous Membranes, Anicteric  Pulmonary: Non-labored, breath sounds are clear bilaterally, No wheezing, rales or rhonchi  Cardiovascular: Regular, S1 and S2, No murmurs, rubs, gallops or clicks  Gastrointestinal: Bowel Sounds present, soft, nontender.   Lymph: No peripheral edema. No lymphadenopathy.  Skin: No visible rashes or ulcers.  Psych:  Mood & affect appropriate    LABS: All Labs Reviewed:                        10.2   17.1  )-----------( 372      ( 04 May 2018 06:41 )             30.4                         9.9    18.3  )-----------( 365      ( 03 May 2018 07:37 )             27.9                         10.0   21.4  )-----------( 413      ( 02 May 2018 22:50 )             28.6     04 May 2018 06:41    138    |  99     |  41     ----------------------------<  204    3.7     |  29     |  3.70   03 May 2018 07:37    138    |  100    |  52     ----------------------------<  184    3.9     |  26     |  3.80   02 May 2018 06:37    136    |  100    |  69     ----------------------------<  265    4.9     |  20     |  4.50     Ca    8.4        04 May 2018 06:41  Ca    8.4        03 May 2018 07:37  Ca    8.8        02 May 2018 06:37 [facility] -- Lorraine Gruber, Laron, Dee, Joseph Carson Savella  Office # [phone]      Follow Up:  Dyspnea    Subjective/Observations: Pt seen and examined.  Lying flat with NAD.  Pt denies cp, sob or palpitations.  No signs of orthopnea or PND.  For Perma-Cath placement today.        REVIEW OF SYSTEMS: All other review of systems is negative unless indicated above    PAST MEDICAL & SURGICAL HISTORY:  h/o Smoking: quitted 3/2012  Murmur, cardiac  PAD (peripheral artery disease)  h/o Hepatitis A 1969: currently resolved, no symptoms  CAD (coronary artery disease)  h/o Myocardial infarct 2007  Depression  h/o Anxiety attack  HTN (hypertension)  Diabetes mellitus II  s/p surgical removal of benign Skin lesion epigastric area  s/p Ovarian cyst removal  coronary stent 2007      MEDICATIONS  (STANDING):  lactated ringers. 1000 milliLiter(s) (70 mL/Hr) IV Continuous <Continuous>  sodium chloride 0.9%. 1000 milliLiter(s) (30 mL/Hr) IV Continuous <Continuous>    MEDICATIONS  (PRN):  HYDROmorphone  Injectable 0.5 milliGRAM(s) IV Push every 10 minutes PRN Moderate Pain (4 - 6)  HYDROmorphone  Injectable 0.5 milliGRAM(s) IV Push every 10 minutes PRN Severe Pain (7 - 10)  ondansetron Injectable 4 milliGRAM(s) IV Push once PRN Nausea and/or Vomiting  oxyCODONE    IR 5 milliGRAM(s) Oral every 4 hours PRN For moderate pain  oxyCODONE    IR 10 milliGRAM(s) Oral every 6 hours PRN For severe pain      Allergies    latex (Unknown)  No Known Drug Allergies    Intolerances            Vital Signs Last 24 Hrs  T(C): 36.9 (04 May 2018 12:02), Max: 37.3 (04 May 2018 09:22)  T(F): 98.4 (04 May 2018 12:02), Max: 99.1 (04 May 2018 09:22)  HR: 90 (04 May 2018 12:02) (88 - 106)  BP: 167/85 (04 May 2018 12:02) (119/82 - 173/85)  BP(mean): --  RR: 16 (04 May 2018 12:02) (16 - 20)  SpO2: 96% (04 May 2018 12:02) (90% - 98%)    I&O's Summary    03 May 2018 07:01  -  04 May 2018 07:00  --------------------------------------------------------  IN: 120 mL / OUT: 1000 mL / NET: -880 mL          PHYSICAL EXAM:  TELE: SR with HR to 140s briefly at 9pm last night.  No further events noted overnight.    Constitutional: NAD, awake and alert, well-developed  HEENT: Moist Mucous Membranes, Anicteric  Pulmonary: Non-labored, breath sounds decreased in bilateral bases. No wheezing, rales or rhonchi  Cardiovascular: Regular, S1 and S2, No murmurs, rubs, gallops or clicks  Gastrointestinal: Bowel Sounds present, soft, nontender.   Lymph: No peripheral edema. No lymphadenopathy.  Skin: No visible rashes or ulcers.  Psych:  Mood & affect appropriate    LABS: All Labs Reviewed:                        10.2   17.1  )-----------( 372      ( 04 May 2018 06:41 )             30.4                         9.9    18.3  )-----------( 365      ( 03 May 2018 07:37 )             27.9                         10.0   21.4  )-----------( 413      ( 02 May 2018 22:50 )             28.6     04 May 2018 06:41    138    |  99     |  41     ----------------------------<  204    3.7     |  29     |  3.70   03 May 2018 07:37    138    |  100    |  52     ----------------------------<  184    3.9     |  26     |  3.80   02 May 2018 06:37    136    |  100    |  69     ----------------------------<  265    4.9     |  20     |  4.50     Ca    8.4        04 May 2018 06:41  Ca    8.4        03 May 2018 07:37  Ca    8.8        02 May 2018 06:37

## 2018-05-04 NOTE — CHART NOTE - NSCHARTNOTEFT_GEN_A_CORE
Pt. scheduled for permacath placement today.  Pt. reports respiratory status is much improved.  States she can lie down flat without feeling SOB.  Pt. denies having any CP.  Pt. without s/s of ACS or decompensated CHF.  No absolute contraindication from medical perspective to proceed with planned Permacath placement today.

## 2018-05-04 NOTE — PROGRESS NOTE ADULT - SUBJECTIVE AND OBJECTIVE BOX
INTERVAL HPI/OVERNIGHT EVENTS: Patient seen and examined at bedside. Patient says she is breathing better. Denies SOB, chest pain or palpitations. Last night: had brief episode of tachycardia ST vs PAT at 9PM.     MEDICATIONS  (STANDING):  ALBUTerol/ipratropium for Nebulization 3 milliLiter(s) Nebulizer every 6 hours  amLODIPine   Tablet 2.5 milliGRAM(s) Oral at bedtime  amLODIPine   Tablet 5 milliGRAM(s) Oral daily  dextrose 5%. 1000 milliLiter(s) (50 mL/Hr) IV Continuous <Continuous>  dextrose 50% Injectable 12.5 Gram(s) IV Push once  dextrose 50% Injectable 25 Gram(s) IV Push once  dextrose 50% Injectable 25 Gram(s) IV Push once  guaiFENesin   Syrup  (Sugar-Free) 100 milliGRAM(s) Oral every 6 hours  heparin  Injectable 5000 Unit(s) SubCutaneous every 12 hours  imipramine 50 milliGRAM(s) Oral daily  insulin glargine Injectable (LANTUS) 18 Unit(s) SubCutaneous at bedtime  insulin lispro (HumaLOG) corrective regimen sliding scale   SubCutaneous three times a day before meals  metoprolol succinate ER 50 milliGRAM(s) Oral daily  tiotropium 18 MICROgram(s) Capsule 1 Capsule(s) Inhalation daily    MEDICATIONS  (PRN):  dextrose Gel 1 Dose(s) Oral once PRN Blood Glucose LESS THAN 70 milliGRAM(s)/deciLiter  glucagon  Injectable 1 milliGRAM(s) IntraMuscular once PRN Glucose <70 milliGRAM(s)/deciLiter      Allergies    latex (Unknown)  No Known Drug Allergies    Intolerances    Review of Systems:   Constitutional: (-) denies fever, chills, diaphoresis   HEENT: denies blurry vision, difficulty hearing  Respiratory: (-) admits SOB, ADAMSON, cough, sputum production  Cardiovascular: (-) denies CP, palpitations, (+) edema  Gastrointestinal: denies nausea, vomiting, diarrhea, constipation, abdominal pain, melena, hematochezia   Genitourinary: denies dysuria, frequency, urgency, hematuria   Skin/Breast: denies rash, itching  Musculoskeletal: denies myalgias, joint swelling, muscle weakness  Neurologic: denies headache, weakness, dizziness, paresthesias, numbness/tingling  Psychiatric: denies feeling anxious, depressed, suicidal, homicidal thoughts  Hematology/Oncology: denies bruising, tender or enlarged lymph nodes   ROS negative except as noted above    Vital Signs Last 24 Hrs  T(C): 36.3 (04 May 2018 13:17), Max: 37.3 (04 May 2018 09:22)  T(F): 97.4 (04 May 2018 13:17), Max: 99.1 (04 May 2018 09:22)  HR: 104 (04 May 2018 13:17) (88 - 106)  BP: 133/87 (04 May 2018 13:17) (119/82 - 173/85)  BP(mean): --  RR: 16 (04 May 2018 13:17) (16 - 20)  SpO2: 98% (04 May 2018 13:17) (90% - 98%)    03 @ 07:01  -   @ 07:00  --------------------------------------------------------  IN: 120 mL / OUT: 1000 mL / NET: -880 mL      Physical Exam  General: Elderly  female in no acute respiratory distress  HEENT: NCAT, PERRLA, EOMI bl, moist mucous membranes   Neck: Supple, nontender, no mass  Neurology: A&Ox3, nonfocal, CN II-XII grossly intact, sensation intact, no gait abnormalities   Respiratory: decreased breath sound bilaterally, no wheezing  CV: RRR, +S1/S2, no murmurs, rubs or gallops  Abdominal: Soft, NT, ND +BSx4  Extremities: No C/C/E, + peripheral pulses  MSK: Normal ROM, no joint erythema or warmth, no joint swelling   Skin: warm, dry, normal color, no rash or abnormal lesion      LABS:                        10.2   17.1  )-----------( 372      ( 04 May 2018 06:41 )             30.4     05-04    138  |  99  |  41<H>  ----------------------------<  204<H>  3.7   |  29  |  3.70<H>    Ca    8.4<L>      04 May 2018 06:41  Phos  3.9     05-04  Mg     2.2     05-04        Urinalysis Basic - ( 02 May 2018 22:19 )    Color: Yellow / Appearance: Clear / S.010 / pH: x  Gluc: x / Ketone: Negative  / Bili: Negative / Urobili: Negative   Blood: x / Protein: 150 mg/dL / Nitrite: Negative   Leuk Esterase: Negative / RBC: 0-2 /HPF / WBC 3-5   Sq Epi: x / Non Sq Epi: Occasional / Bacteria: Negative

## 2018-05-04 NOTE — PROGRESS NOTE ADULT - SUBJECTIVE AND OBJECTIVE BOX
Patient is a 67y Female whom presented to the hospital with esrd required hd and sob due to maney years poor dm controlled with hga1c above 10 , s/p perm cath now   no fever no chills no abd pain , feeling better     PAST MEDICAL & SURGICAL HISTORY:  h/o Smoking: quitted 3/2012  Murmur, cardiac  PAD (peripheral artery disease)  h/o Hepatitis A 1969: currently resolved, no symptoms  CAD (coronary artery disease)  h/o Myocardial infarct   Depression  h/o Anxiety attack  HTN (hypertension)  Diabetes mellitus II  s/p surgical removal of benign Skin lesion epigastric area  s/p Ovarian cyst removal  coronary stent     Constitutional: No fever, fatigue or weight loss.  Skin: No rash.  Eyes: No recent vision problems or eye pain.  ENT: No congestion, ear pain, or sore throat.  Endocrine: No thyroid problems.  Cardiovascular: No chest pain or palpation.  Respiratory: No cough, shortness of breath, congestion, or wheezing.  Gastrointestinal: No abdominal pain, nausea, vomiting, or diarrhea.  Genitourinary: No dysuria.  Musculoskeletal: No joint swelling.  Neurologic: No headache.        MEDICATIONS  (STANDING):  amLODIPine   Tablet 2.5 milliGRAM(s) Oral at bedtime  amLODIPine   Tablet 5 milliGRAM(s) Oral daily  aspirin enteric coated 81 milliGRAM(s) Oral daily  dextrose 5%. 1000 milliLiter(s) (50 mL/Hr) IV Continuous <Continuous>  dextrose 50% Injectable 12.5 Gram(s) IV Push once  dextrose 50% Injectable 25 Gram(s) IV Push once  dextrose 50% Injectable 25 Gram(s) IV Push once  guaiFENesin   Syrup  (Sugar-Free) 100 milliGRAM(s) Oral every 6 hours  heparin  Injectable 5000 Unit(s) SubCutaneous every 12 hours  imipramine 50 milliGRAM(s) Oral daily  insulin glargine Injectable (LANTUS) 7 Unit(s) SubCutaneous at bedtime  insulin lispro (HumaLOG) corrective regimen sliding scale   SubCutaneous three times a day before meals  metoprolol succinate ER 50 milliGRAM(s) Oral daily  sodium chloride 0.9%. 1000 milliLiter(s) (60 mL/Hr) IV Continuous <Continuous>  tiotropium 18 MICROgram(s) Capsule 1 Capsule(s) Inhalation daily                                           10.2   17.1  )-----------( 372      ( 04 May 2018 06:41 )             30.4       CBC Full  -  ( 04 May 2018 06:41 )  WBC Count : 17.1 K/uL  Hemoglobin : 10.2 g/dL  Hematocrit : 30.4 %  Platelet Count - Automated : 372 K/uL  Mean Cell Volume : 86.2 fl  Mean Cell Hemoglobin : 28.9 pg  Mean Cell Hemoglobin Concentration : 33.5 gm/dL  Auto Neutrophil # : x  Auto Lymphocyte # : x  Auto Monocyte # : x  Auto Eosinophil # : x  Auto Basophil # : x  Auto Neutrophil % : x  Auto Lymphocyte % : x  Auto Monocyte % : x  Auto Eosinophil % : x  Auto Basophil % : x      05-04    138  |  99  |  41<H>  ----------------------------<  204<H>  3.7   |  29  |  3.70<H>    Ca    8.4<L>      04 May 2018 06:41  Phos  3.9     05-04  Mg     2.2     05-04        CAPILLARY BLOOD GLUCOSE      POCT Blood Glucose.: 234 mg/dL (04 May 2018 11:28)  POCT Blood Glucose.: 198 mg/dL (04 May 2018 07:21)  POCT Blood Glucose.: 306 mg/dL (03 May 2018 21:45)  POCT Blood Glucose.: 129 mg/dL (03 May 2018 16:52)      Vital Signs Last 24 Hrs  T(C): 36.9 (04 May 2018 16:06), Max: 37.3 (04 May 2018 09:22)  T(F): 98.5 (04 May 2018 16:06), Max: 99.1 (04 May 2018 09:22)  HR: 101 (04 May 2018 16:06) (88 - 108)  BP: 158/86 (04 May 2018 16:06) (119/82 - 173/85)  BP(mean): --  RR: 16 (04 May 2018 16:06) (16 - 20)  SpO2: 98% (04 May 2018 16:06) (90% - 98%)    Urinalysis Basic - ( 02 May 2018 22:19 )    Color: Yellow / Appearance: Clear / S.010 / pH: x  Gluc: x / Ketone: Negative  / Bili: Negative / Urobili: Negative   Blood: x / Protein: 150 mg/dL / Nitrite: Negative   Leuk Esterase: Negative / RBC: 0-2 /HPF / WBC 3-5   Sq Epi: x / Non Sq Epi: Occasional / Bacteria: Negative                PHYSICAL EXAM:    Constitutional: NAD  HEENT: conjunctive   clear   Neck:  No JVD  Respiratory: CTAB  Cardiovascular: S1 and S2  Gastrointestinal: BS+, soft, NT/ND  Extremities: pos  peripheral edema  Neurological: A/O x 3, no focal deficits  Psychiatric: Normal mood, normal affect  : No Renteria  Skin: dry

## 2018-05-04 NOTE — PROGRESS NOTE ADULT - SUBJECTIVE AND OBJECTIVE BOX
Date/Time Patient Seen:  		  Referring MD:   Data Reviewed	       Patient is a 67y old  Female who presents with a chief complaint of dypsnea (03 May 2018 15:26)  in bed  seen and examined  ID follow up noted  Renal follow up noted  s/p HD        Subjective/HPI     PAST MEDICAL & SURGICAL HISTORY:  h/o Smoking: quitted 3/2012  Murmur, cardiac  PAD (peripheral artery disease)  h/o Hepatitis A 1969: currently resolved, no symptoms  CAD (coronary artery disease)  CAD (coronary artery disease)  h/o Myocardial infarct 2007  Depression  h/o Anxiety attack  HTN (hypertension)  Diabetes mellitus II  s/p surgical removal of benign Skin lesion epigastric area  s/p Ovarian cyst removal  coronary stent 2007        Medication list         MEDICATIONS  (STANDING):  amLODIPine   Tablet 2.5 milliGRAM(s) Oral at bedtime  amLODIPine   Tablet 5 milliGRAM(s) Oral daily  dextrose 5%. 1000 milliLiter(s) (50 mL/Hr) IV Continuous <Continuous>  dextrose 50% Injectable 12.5 Gram(s) IV Push once  dextrose 50% Injectable 25 Gram(s) IV Push once  dextrose 50% Injectable 25 Gram(s) IV Push once  furosemide   Injectable 40 milliGRAM(s) IV Push two times a day  guaiFENesin   Syrup  (Sugar-Free) 100 milliGRAM(s) Oral every 6 hours  heparin  Injectable 5000 Unit(s) SubCutaneous every 12 hours  imipramine 50 milliGRAM(s) Oral daily  insulin glargine Injectable (LANTUS) 18 Unit(s) SubCutaneous at bedtime  insulin lispro (HumaLOG) corrective regimen sliding scale   SubCutaneous three times a day before meals  insulin lispro (HumaLOG) corrective regimen sliding scale   SubCutaneous at bedtime  melatonin 3 milliGRAM(s) Oral at bedtime  metoprolol succinate ER 50 milliGRAM(s) Oral daily  tiotropium 18 MICROgram(s) Capsule 1 Capsule(s) Inhalation daily    MEDICATIONS  (PRN):  acetaminophen   Tablet. 650 milliGRAM(s) Oral every 6 hours PRN Mild Pain (1 - 3)  ALBUTerol    0.083% 2.5 milliGRAM(s) Nebulizer every 6 hours PRN Shortness of Breath and/or Wheezing  dextrose Gel 1 Dose(s) Oral once PRN Blood Glucose LESS THAN 70 milliGRAM(s)/deciliter  glucagon  Injectable 1 milliGRAM(s) IntraMuscular once PRN Glucose LESS THAN 70 milligrams/deciliter         Vitals log        ICU Vital Signs Last 24 Hrs  T(C): 37 (04 May 2018 04:36), Max: 37.2 (03 May 2018 23:20)  T(F): 98.6 (04 May 2018 04:36), Max: 99 (03 May 2018 23:20)  HR: 100 (04 May 2018 04:36) (55 - 106)  BP: 119/82 (04 May 2018 04:36) (119/82 - 169/88)  BP(mean): --  ABP: --  ABP(mean): --  RR: 17 (04 May 2018 04:36) (17 - 20)  SpO2: 96% (04 May 2018 04:36) (92% - 96%)           Input and Output:  I&O's Detail    02 May 2018 07:01  -  03 May 2018 07:00  --------------------------------------------------------  IN:  Total IN: 0 mL    OUT:    Other: 1000 mL  Total OUT: 1000 mL    Total NET: -1000 mL      03 May 2018 07:01  -  04 May 2018 06:43  --------------------------------------------------------  IN:    Oral Fluid: 120 mL  Total IN: 120 mL    OUT:    Other: 1000 mL  Total OUT: 1000 mL    Total NET: -880 mL          Lab Data                        9.9    18.3  )-----------( 365      ( 03 May 2018 07:37 )             27.9     05-03    138  |  100  |  52<H>  ----------------------------<  184<H>  3.9   |  26  |  3.80<H>    Ca    8.4<L>      03 May 2018 07:37              Review of Systems	      Objective     Physical Examination    head at  heart s1s2  lung dec BS  abd soft      Pertinent Lab findings & Imaging      Dexter:  NO   Adequate UO     I&O's Detail    02 May 2018 07:01  -  03 May 2018 07:00  --------------------------------------------------------  IN:  Total IN: 0 mL    OUT:    Other: 1000 mL  Total OUT: 1000 mL    Total NET: -1000 mL      03 May 2018 07:01  -  04 May 2018 06:43  --------------------------------------------------------  IN:    Oral Fluid: 120 mL  Total IN: 120 mL    OUT:    Other: 1000 mL  Total OUT: 1000 mL    Total NET: -880 mL               Discussed with:     Cultures:	        Radiology

## 2018-05-04 NOTE — PROGRESS NOTE ADULT - PROBLEM SELECTOR PLAN 1
-Patient has no prior history of HF, however pro BNP (33,668).   -Continue Lasix 40mg  IV BID  -echo showed moderate segmental LVSD, EF 30%, elevated filling pressures, MAC, moderate MR, and B/L pleural effusions.  -Patient can decompensate quickly; will avoid IVF  -Cardio (Dr. Price) following  -Patient follows Dr. Flo Rios (cardio)  -Strict I and Os

## 2018-05-04 NOTE — PROGRESS NOTE ADULT - PROBLEM SELECTOR PLAN 2
-DAVIS on CKD  -Permacath to be placed today   - HOLD NSAIDs, ACE, ARBs   - trend BUN/Cr  - f/u Nephro Dr. Reina

## 2018-05-04 NOTE — PROGRESS NOTE ADULT - PROBLEM SELECTOR PLAN 1
Excess fluids and waste products will be removed from your blood; your electrolytes will be balanced; your blood pressure will be controlled.  continue  with hemodialysis .  s/p perm  cath

## 2018-05-04 NOTE — PROGRESS NOTE ADULT - ASSESSMENT
Patient is a 66 y/o  female PMHx Type 2 Diabetes on lantus, HTN, CAD s/p stent (2007), Depression, Anxiety, former smoker brought in by EMS for shortness of breath. pt was seen in my office last week with no complains , try to set up hemodialysis for pt ,,  s/p perm cath

## 2018-05-04 NOTE — CONSULT NOTE ADULT - ASSESSMENT
68 yo F with history of HTN, PAD, hyperlipidemia, DMII, MI (2007) s/p coronary stent, depression admitted for SOB and worsening renal failure, now requiring hemodialysis.       Plan  Proceed with permacath insertion today in OR  Will discuss need for future AVF/AVG with nephrology  Risks and complications of procedure explained to patient

## 2018-05-04 NOTE — PROGRESS NOTE ADULT - ASSESSMENT
68 y/o  female PMHx Type 2 Diabetes on lantus, HTN, CAD s/p stent (2007), Depression, Anxiety, CKD, former smoker presenting with sob.     - Pt lying flat more comfortable today.  Still remains with some degree of fluid overload but improved overall.    - Tele with brief episode of tachycardia ST vs PAT at 9pm on 5/3.  No further events  Will monitor closely.  Pt with O2 sat of 89% at the time. Cont Tele.   - Monitor electrolytes and replace as needed.  Maintain K > 4 Mag >2  - For perma-cath placement today by vascular in OR.  Would maintain routine hemodynamics during the perioperative period.    - HD per renal.  Last session 5/3 with 1 L removed  - Likely multifactorial 2/2 to acute CHF with some component possible of underlying lung disease copd/pna  - Please continue to maintain strict I/Os, monitor daily weights, Cr, and K.   - Continue supplemental oxygen as needed  - echo yesterday showed moderate segmental LVSD, EF 30%, elevated filling pressures, MAC, moderate MR, and B/L pleural effusions.  - Patient can decompensate quickly; would avoid IVF  - BP elevated in ED. Now improving. Continue amlodipine 2.5 mg qhs, amlodipine 5mg qd, Toprol 50mg qd  - Continue asa 81mg qd  - Fluid restriction  - Other cardiovascular workup will depend on clinical course.  - All other workup per primary team  - Will follow     Kady Eubanks NP-C  Cardiology 66 y/o  female PMHx Type 2 Diabetes on lantus, HTN, CAD s/p stent (2007), Depression, Anxiety, CKD, former smoker presenting with sob.     - Pt lying flat more comfortable today.  Still remains with some degree of fluid overload but improved overall.    - Tele with brief episode of tachycardia ST vs PAT at 9pm on 5/3.  No further events  Will monitor closely.  Pt with O2 sat of 89% at the time. Cont Tele. If cont may consider starting low dose BB.    - Monitor electrolytes and replace as needed.  Maintain K > 4 Mag >2  - For perma-cath placement today by vascular in OR.  Would maintain routine hemodynamics during the perioperative period.    - HD per renal.  Last session 5/3 with 1 L removed  - Likely multifactorial 2/2 to acute CHF with some component possible of underlying lung disease copd/pna  - Please continue to maintain strict I/Os, monitor daily weights, Cr, and K.   - Continue supplemental oxygen as needed  - echo yesterday showed moderate segmental LVSD, EF 30%, elevated filling pressures, MAC, moderate MR, and B/L pleural effusions.  - Patient can decompensate quickly; would avoid IVF  - BP elevated in ED. Now improving. Continue amlodipine 2.5 mg qhs, amlodipine 5mg qd, Toprol 50mg qd  - Continue asa 81mg qd  - Fluid restriction  - Other cardiovascular workup will depend on clinical course.  - All other workup per primary team  - Will follow     Kady Eubanks NP-C  Cardiology

## 2018-05-04 NOTE — BRIEF OPERATIVE NOTE - PROCEDURE
<<-----Click on this checkbox to enter Procedure Insertion of permanent catheter with imaging guidance  05/04/2018    Active  GSIEGEL

## 2018-05-05 LAB
ANION GAP SERPL CALC-SCNC: 10 MMOL/L — SIGNIFICANT CHANGE UP (ref 5–17)
BUN SERPL-MCNC: 30 MG/DL — HIGH (ref 7–23)
CALCIUM SERPL-MCNC: 8.2 MG/DL — LOW (ref 8.5–10.1)
CHLORIDE SERPL-SCNC: 97 MMOL/L — SIGNIFICANT CHANGE UP (ref 96–108)
CO2 SERPL-SCNC: 29 MMOL/L — SIGNIFICANT CHANGE UP (ref 22–31)
CREAT SERPL-MCNC: 3.2 MG/DL — HIGH (ref 0.5–1.3)
GLUCOSE SERPL-MCNC: 280 MG/DL — HIGH (ref 70–99)
HCT VFR BLD CALC: 31.8 % — LOW (ref 34.5–45)
HGB BLD-MCNC: 10.6 G/DL — LOW (ref 11.5–15.5)
MCHC RBC-ENTMCNC: 28.7 PG — SIGNIFICANT CHANGE UP (ref 27–34)
MCHC RBC-ENTMCNC: 33.4 GM/DL — SIGNIFICANT CHANGE UP (ref 32–36)
MCV RBC AUTO: 86 FL — SIGNIFICANT CHANGE UP (ref 80–100)
PLATELET # BLD AUTO: 287 K/UL — SIGNIFICANT CHANGE UP (ref 150–400)
POTASSIUM SERPL-MCNC: 3.3 MMOL/L — LOW (ref 3.5–5.3)
POTASSIUM SERPL-SCNC: 3.3 MMOL/L — LOW (ref 3.5–5.3)
RBC # BLD: 3.69 M/UL — LOW (ref 3.8–5.2)
RBC # FLD: 14.6 % — HIGH (ref 10.3–14.5)
SODIUM SERPL-SCNC: 136 MMOL/L — SIGNIFICANT CHANGE UP (ref 135–145)
WBC # BLD: 14.6 K/UL — HIGH (ref 3.8–10.5)
WBC # FLD AUTO: 14.6 K/UL — HIGH (ref 3.8–10.5)

## 2018-05-05 PROCEDURE — 99233 SBSQ HOSP IP/OBS HIGH 50: CPT

## 2018-05-05 RX ORDER — POTASSIUM CHLORIDE 20 MEQ
40 PACKET (EA) ORAL ONCE
Qty: 0 | Refills: 0 | Status: COMPLETED | OUTPATIENT
Start: 2018-05-05 | End: 2018-05-05

## 2018-05-05 RX ORDER — CALCIUM CARBONATE 500(1250)
1 TABLET ORAL ONCE
Qty: 0 | Refills: 0 | Status: COMPLETED | OUTPATIENT
Start: 2018-05-05 | End: 2018-05-05

## 2018-05-05 RX ORDER — INSULIN LISPRO 100/ML
5 VIAL (ML) SUBCUTANEOUS
Qty: 0 | Refills: 0 | Status: DISCONTINUED | OUTPATIENT
Start: 2018-05-05 | End: 2018-05-06

## 2018-05-05 RX ORDER — INSULIN GLARGINE 100 [IU]/ML
21 INJECTION, SOLUTION SUBCUTANEOUS AT BEDTIME
Qty: 0 | Refills: 0 | Status: DISCONTINUED | OUTPATIENT
Start: 2018-05-05 | End: 2018-05-06

## 2018-05-05 RX ORDER — INSULIN LISPRO 100/ML
VIAL (ML) SUBCUTANEOUS AT BEDTIME
Qty: 0 | Refills: 0 | Status: DISCONTINUED | OUTPATIENT
Start: 2018-05-05 | End: 2018-05-06

## 2018-05-05 RX ADMIN — Medication 6: at 07:35

## 2018-05-05 RX ADMIN — Medication 40 MILLIGRAM(S): at 17:06

## 2018-05-05 RX ADMIN — Medication 50 MILLIGRAM(S): at 11:37

## 2018-05-05 RX ADMIN — AMLODIPINE BESYLATE 2.5 MILLIGRAM(S): 2.5 TABLET ORAL at 21:29

## 2018-05-05 RX ADMIN — Medication 1 TABLET(S): at 19:50

## 2018-05-05 RX ADMIN — AMLODIPINE BESYLATE 5 MILLIGRAM(S): 2.5 TABLET ORAL at 05:11

## 2018-05-05 RX ADMIN — Medication 8: at 17:07

## 2018-05-05 RX ADMIN — Medication 50 MILLIGRAM(S): at 05:11

## 2018-05-05 RX ADMIN — Medication 3 MILLIGRAM(S): at 21:29

## 2018-05-05 RX ADMIN — HEPARIN SODIUM 5000 UNIT(S): 5000 INJECTION INTRAVENOUS; SUBCUTANEOUS at 05:10

## 2018-05-05 RX ADMIN — Medication 4: at 21:51

## 2018-05-05 RX ADMIN — Medication 5 UNIT(S): at 17:07

## 2018-05-05 RX ADMIN — Medication 12: at 11:37

## 2018-05-05 RX ADMIN — Medication 3 MILLILITER(S): at 19:14

## 2018-05-05 RX ADMIN — Medication 40 MILLIGRAM(S): at 05:11

## 2018-05-05 RX ADMIN — Medication 40 MILLIEQUIVALENT(S): at 19:50

## 2018-05-05 RX ADMIN — INSULIN GLARGINE 21 UNIT(S): 100 INJECTION, SOLUTION SUBCUTANEOUS at 21:29

## 2018-05-05 RX ADMIN — HEPARIN SODIUM 5000 UNIT(S): 5000 INJECTION INTRAVENOUS; SUBCUTANEOUS at 17:07

## 2018-05-05 NOTE — PROGRESS NOTE ADULT - PROBLEM SELECTOR PLAN 1
Acute on chronic systolic CHF, -Patient has no prior history of HF, however pro BNP (33,668).   -Continue Lasix 40mg  IV BID  -echo showed moderate segmental LVSD, EF 30%, elevated filling pressures, MAC, moderate MR, and B/L pleural effusions.  -Patient can decompensate quickly; will avoid IVF  -Cardio (Dr. Price) following  -Patient follows Dr. Flo Rios (cardio)  -Strict I and Os

## 2018-05-05 NOTE — PROGRESS NOTE ADULT - PROBLEM SELECTOR PLAN 1
Excess fluids and waste products will be removed from your blood; your electrolytes will be balanced; your blood pressure will be controlled.  continue  with hemodialysis .  s/p perm  cath  dc planning

## 2018-05-05 NOTE — PROGRESS NOTE ADULT - PROBLEM SELECTOR PLAN 1
esrd, hf, small effusions, copd  smoking cess ed  cont HD as per renal  diuresis - pt still produces urine  I and O  serial labs  cont NEBS and Inhaler regimen, o2 support,  keep sat > 88 pct  assess sat on exertion  planned for poss MEGHAN dc  will be needing HD 3 per week

## 2018-05-05 NOTE — PROGRESS NOTE ADULT - PROBLEM SELECTOR PLAN 6
-Chronic. with severe hyperglycemia,   -Increased Lantus to 21 units, fingersticks have been elevated, add lispro with meals TID  - Accuchecks, Hypoglycemic protocol   - HgbA1c: 8.5  - Diet: Consistent carb

## 2018-05-05 NOTE — PROGRESS NOTE ADULT - SUBJECTIVE AND OBJECTIVE BOX
Patient is a 67y Female whom presented to the hospital with esrd required hd and sob due to maney years poor dm controlled with hga1c above 10 , s/p perm cath now   no fever no chills no abd pain , feeling better     PAST MEDICAL & SURGICAL HISTORY:  h/o Smoking: quitted 3/2012  Murmur, cardiac  PAD (peripheral artery disease)  h/o Hepatitis A 1969: currently resolved, no symptoms  CAD (coronary artery disease)  h/o Myocardial infarct 2007  Depression  h/o Anxiety attack  HTN (hypertension)  Diabetes mellitus II  s/p surgical removal of benign Skin lesion epigastric area  s/p Ovarian cyst removal  coronary stent 2007    Constitutional: No fever, fatigue or weight loss.  Skin: No rash.  Eyes: No recent vision problems or eye pain.  ENT: No congestion, ear pain, or sore throat.  Endocrine: No thyroid problems.  Cardiovascular: No chest pain or palpation.  Respiratory: No cough, shortness of breath, congestion, or wheezing.  Gastrointestinal: No abdominal pain, nausea, vomiting, or diarrhea.  Genitourinary: No dysuria.  Musculoskeletal: No joint swelling.  Neurologic: No headache.        MEDICATIONS  (STANDING):  amLODIPine   Tablet 2.5 milliGRAM(s) Oral at bedtime  amLODIPine   Tablet 5 milliGRAM(s) Oral daily  aspirin enteric coated 81 milliGRAM(s) Oral daily  dextrose 5%. 1000 milliLiter(s) (50 mL/Hr) IV Continuous <Continuous>  dextrose 50% Injectable 12.5 Gram(s) IV Push once  dextrose 50% Injectable 25 Gram(s) IV Push once  dextrose 50% Injectable 25 Gram(s) IV Push once  guaiFENesin   Syrup  (Sugar-Free) 100 milliGRAM(s) Oral every 6 hours  heparin  Injectable 5000 Unit(s) SubCutaneous every 12 hours  imipramine 50 milliGRAM(s) Oral daily  insulin glargine Injectable (LANTUS) 7 Unit(s) SubCutaneous at bedtime  insulin lispro (HumaLOG) corrective regimen sliding scale   SubCutaneous three times a day before meals  metoprolol succinate ER 50 milliGRAM(s) Oral daily  sodium chloride 0.9%. 1000 milliLiter(s) (60 mL/Hr) IV Continuous <Continuous>  tiotropium 18 MICROgram(s) Capsule 1 Capsule(s) Inhalation daily                                          10.6   14.6  )-----------( 287      ( 05 May 2018 05:01 )             31.8       CBC Full  -  ( 05 May 2018 05:01 )  WBC Count : 14.6 K/uL  Hemoglobin : 10.6 g/dL  Hematocrit : 31.8 %  Platelet Count - Automated : 287 K/uL  Mean Cell Volume : 86.0 fl  Mean Cell Hemoglobin : 28.7 pg  Mean Cell Hemoglobin Concentration : 33.4 gm/dL  Auto Neutrophil # : x  Auto Lymphocyte # : x  Auto Monocyte # : x  Auto Eosinophil # : x  Auto Basophil # : x  Auto Neutrophil % : x  Auto Lymphocyte % : x  Auto Monocyte % : x  Auto Eosinophil % : x  Auto Basophil % : x      05-05    136  |  97  |  30<H>  ----------------------------<  280<H>  3.3<L>   |  29  |  3.20<H>    Ca    8.2<L>      05 May 2018 05:01  Phos  3.9     05-04  Mg     2.2     05-04        CAPILLARY BLOOD GLUCOSE      POCT Blood Glucose.: 324 mg/dL (05 May 2018 16:21)  POCT Blood Glucose.: 450 mg/dL (05 May 2018 13:57)  POCT Blood Glucose.: 548 mg/dL (05 May 2018 11:18)  POCT Blood Glucose.: 271 mg/dL (05 May 2018 07:27)  POCT Blood Glucose.: 220 mg/dL (04 May 2018 21:18)      Vital Signs Last 24 Hrs  T(C): 37.2 (05 May 2018 20:08), Max: 37.6 (05 May 2018 05:09)  T(F): 98.9 (05 May 2018 20:08), Max: 99.6 (05 May 2018 05:09)  HR: 107 (05 May 2018 20:08) (96 - 111)  BP: 149/78 (05 May 2018 20:08) (129/79 - 158/85)  BP(mean): --  RR: 17 (05 May 2018 20:08) (16 - 18)  SpO2: 97% (05 May 2018 20:08) (92% - 100%)                PHYSICAL EXAM:    Constitutional: NAD  HEENT: conjunctive   clear   Neck:  No JVD  Respiratory: CTAB  Cardiovascular: S1 and S2  Gastrointestinal: BS+, soft, NT/ND  Extremities: pos  peripheral edema  Neurological: A/O x 3, no focal deficits  Psychiatric: Normal mood, normal affect  : No Renteria  Skin: dry

## 2018-05-05 NOTE — PROGRESS NOTE ADULT - SUBJECTIVE AND OBJECTIVE BOX
Follow up: SOB, CAD    HPI:  Patient is a 66 y/o  female PMHx Type 2 Diabetes on lantus, HTN, CAD s/p stent (2007), Depression, Anxiety, former smoker brought in by EMS for shortness of breath. The sob has progressed over 1 week. The sob is associated with a productive cough with clear sputum. The sob/cough are worse with activity and relieved with rest. She admits to swelling in the feet that has increased this week. She denies recent travel or sick contacts. She received a flu vaccine this year. She denies fever, chills, cp, palpitations, N/V/C/D, abdominal pain.     In ED afebrile, tachycardic, hypertensive, SpO2 81% on RA, 94% on 2LNC. Labs significant for a WBC 10.8, BUN 54, Cr 4.1, Glucose 266, Lactate 0.9. CT chest small to moderate b/l pleural effusions, right > left. Meds: Duonebs, Solumedrol, (01 May 2018 05:21)      She is awake and alert today, still with some dyspnea on exertion. She reports no chest pain or palpitations     PAST MEDICAL & SURGICAL HISTORY:  h/o Smoking: quitted 3/2012  Murmur, cardiac  PAD (peripheral artery disease)  h/o Hepatitis A 1969: currently resolved, no symptoms  CAD (coronary artery disease)  h/o Myocardial infarct 2007  Depression  h/o Anxiety attack  HTN (hypertension)  Diabetes mellitus II  s/p surgical removal of benign Skin lesion epigastric area  s/p Ovarian cyst removal  coronary stent 2007      MEDICATIONS  (STANDING):  ALBUTerol/ipratropium for Nebulization 3 milliLiter(s) Nebulizer every 6 hours  amLODIPine   Tablet 2.5 milliGRAM(s) Oral at bedtime  amLODIPine   Tablet 5 milliGRAM(s) Oral daily  dextrose 5%. 1000 milliLiter(s) (50 mL/Hr) IV Continuous <Continuous>  dextrose 50% Injectable 12.5 Gram(s) IV Push once  dextrose 50% Injectable 25 Gram(s) IV Push once  dextrose 50% Injectable 25 Gram(s) IV Push once  furosemide   Injectable 40 milliGRAM(s) IV Push two times a day  guaiFENesin   Syrup  (Sugar-Free) 100 milliGRAM(s) Oral every 6 hours  heparin  Injectable 5000 Unit(s) SubCutaneous every 12 hours  imipramine 50 milliGRAM(s) Oral daily  insulin glargine Injectable (LANTUS) 18 Unit(s) SubCutaneous at bedtime  insulin lispro (HumaLOG) corrective regimen sliding scale   SubCutaneous three times a day before meals  melatonin 3 milliGRAM(s) Oral at bedtime  metoprolol succinate ER 50 milliGRAM(s) Oral daily  tiotropium 18 MICROgram(s) Capsule 1 Capsule(s) Inhalation daily    Vital Signs Last 24 Hrs  T(C): 36.8 (05 May 2018 12:10), Max: 37.6 (05 May 2018 05:09)  T(F): 98.2 (05 May 2018 12:10), Max: 99.6 (05 May 2018 05:09)  HR: 96 (05 May 2018 12:10) (93 - 111)  BP: 147/79 (05 May 2018 12:10) (129/79 - 158/86)  BP(mean): --  RR: 16 (05 May 2018 12:10) (16 - 18)  SpO2: 100% (05 May 2018 12:10) (92% - 100%)    I&O's Summary    04 May 2018 07:01  -  05 May 2018 07:00  --------------------------------------------------------  IN: 800 mL / OUT: 2800 mL / NET: -2000 mL        PHYSICAL EXAM:    Constitutional: NAD, awake and alert, well-developed  Eyes:  Pupils round, no lesions  ENMT: no exudate or erythema  Pulmonary: Non-labored, breath sounds are clear bilaterally, No wheezing, rales or rhonchi  Cardiovascular: PMI not palpable RRR normal S1 and S2, no murmurs, rubs, gallops or clicks  Gastrointestinal: Bowel Sounds present, soft, nontender.   Lymph: No cervical lymphadenopathy.  Neurological: Alert, no focal deficits  Skin: No rashes.  No cyanosis.  Psych:  Mood & affect appropriate   Ext: No lower ext edema                                10.6   14.6  )-----------( 287      ( 05 May 2018 05:01 )             31.8     05-05    136  |  97  |  30<H>  ----------------------------<  280<H>  3.3<L>   |  29  |  3.20<H>    Ca    8.2<L>      05 May 2018 05:01  Phos  3.9     05-04  Mg     2.2     05-04    < from: 12 Lead ECG (05.01.18 @ 03:30) >    Ventricular Rate 108 BPM    Atrial Rate 108 BPM    P-R Interval 158 ms    QRS Duration 112 ms    Q-T Interval 362 ms    QTC Calculation(Bezet) 485 ms    P Axis 68 degrees    R Axis 53 degrees    T Axis -52 degrees    Diagnosis Line Sinus tachycardia  Cannot rule out Anterior infarct , age undetermined  ST & T wave abnormality, consider inferior ischemia  Abnormal ECG  No previous ECGs available  Confirmed by LA RODRIGUEZ ([id number]) on 5/1/2018 7:21:27 PM    < end of copied text >    < from: Xray Chest 1 View- PORTABLE-Urgent (05.04.18 @ 12:15) >    EXAM:  XR CHEST PORTABLE URGENT 1V                            PROCEDURE DATE:  05/04/2018          INTERPRETATION:  Clinical information: Status post permacath insertion.    Technique: Frontal view of the chest.    Comparison: Prior chest x-ray examination from 5/3/2018.    Findings: There is a right sided dialysis catheter with its tip at the   SVC.    Small bilateral pleural effusions appear relatively unchanged. The heart   size is normal. The visualized osseous structures are unremarkable.    IMPRESSION: Unchanged small bilateral pleural effusions.    Right-sided dialysis catheter in place.                CHIARA BRUCE M.D., ATTENDING RADIOLOGIST  This document has been electronically signed. May  4 2018 12:16PM                < end of copied text >    < from: TTE Echo Doppler w/o Cont (05.01.18 @ 11:08) >     EXAM:  ECHO TTE W/O CON COMP W/DOPPLR         PROCEDURE DATE:  05/01/2018        INTERPRETATION:  INDICATION: Shortness of breath    Blood Pressure 160/87    Height 177.8     Weight 69       BSA 1.86    Dimensions:    LA 3.0       Normal Values: 2.0 - 4.0 cm    Ao 3.3        Normal Values: 2.0 - 3.8 cm  SEPTUM 1.1       Normal Values: 0.6 - 1.2 cm  PWT 1.0       Normal Values: 0.6 - 1.1 cm  LVIDd 4.6         Normal Values: 3.0 - 5.6 cm  LVIDs 3.9         Normal Values: 1.8 - 4.0 cm    Derived Variables:  LVMI     g/m2  RWT      Fractional Short      Ejection Fraction 30    Doppler Peak v. AoV=   (m/sec)    OBSERVATIONS:    Mitral Valve: MAC, moderate MR.  Aortic Valve/Aorta: Ossified trileaflet aortic valve.  Tricuspid Valve: normal with trace TR.  Pulmonic Valve: normal  Left Atrium: normal  Right Atrium: normal  Left Ventricle: The left ventricle appears to be normal in size with   moderate segmental dysfunction, estimated LVEF of 30%. The inferior,   posterior and lateral walls are all severely hypokinetic to akinetic. The   basal inferior wall appears thinned and aneurysmal. The apex is   hypokinetic, as is the distal anteroseptum. No apical thrombus is   identified.   Right Ventricle: normal size and systolic function.  Pericardium/Pleura: Bilateral pleural effusion, no significant   pericardial effusion.  Pulmonary/RV Pressure: Inadequate TR jet to estimate PA systolic pressure.  LV Diastolic Function:  E:E' is consistent with elevated left heart   filling pressure.    The left ventricle appears to be normal in size with moderate segmental   dysfunction, estimated LVEF of 30%. The inferior, posterior and lateral   walls are all severely hypokinetic to akinetic. The basal inferior wall   appears thinned and aneurysmal.The apex is hypokinetic, as is the distal   anteroseptum. No apical thrombus is identified. Normal right ventricular   size and systolic function.  E:E' is consistent with elevated left heart   filling pressure. Normal biatrial size. The aortic root is normal in   size. Mac, moderate MR. The aortic valve is calcified without stenosis or   insufficiency. Trace physiologic TR. Inadequate TR jet to estimate PA   systolic pressure. Bilateral pleural effusions. No significant   pericardial effusion.                 MILENA YEBOAH M.D., ATTENDING CARDIOLOGIST  This document has been electronically signed. May  2 2018  1:41PM                < end of copied text >

## 2018-05-05 NOTE — PROGRESS NOTE ADULT - SUBJECTIVE AND OBJECTIVE BOX
DEPARTMENT OF ANESTHESIA  POST ANESTHETIC EVALUATION    The Patient was interviewed and evaluated.  The patient is S/P a permacath insertion    Vital Signs Last 24 Hrs  T(C): 36.9 (05 May 2018 16:26), Max: 37.6 (05 May 2018 05:09)  T(F): 98.5 (05 May 2018 16:26), Max: 99.6 (05 May 2018 05:09)  HR: 101 (05 May 2018 16:26) (96 - 111)  BP: 135/81 (05 May 2018 16:26) (129/79 - 158/85)  BP(mean): --  RR: 16 (05 May 2018 12:10) (16 - 18)  SpO2: 98% (05 May 2018 16:26) (92% - 100%)    Evaluation:  The patient is doing well     (x ) No apparent complications or complaints regarding anesthesia care at this time  (x ) All questions were answered    Condition:  (x Stable      ( ) Guarded      ( ) Critical    Recommendations:  (x) None     ( ) Other:

## 2018-05-05 NOTE — PROGRESS NOTE ADULT - PROBLEM SELECTOR PLAN 9
DVT ppx - Heparin 5000 subQ q12H  Diet: Consistent Carb
DVT ppx - Heparin 5000 subQ q12H  Diet: Consistent Carb
DVT ppx - Heparin 5000 subQ q12H  Diet: Consistent Carb   Activity - out of bed to sarabjit  Fall precautions
DVT ppx - Heparin 5000 subQ q12H  Diet: Consistent Carb   Activity - out of bed to sarabjit  Fall precautions

## 2018-05-05 NOTE — PROGRESS NOTE ADULT - SUBJECTIVE AND OBJECTIVE BOX
Date/Time Patient Seen:  		  Referring MD:   Data Reviewed	       Patient is a 67y old  Female who presents with a chief complaint of Dyspnea (03 May 2018 15:26)  in bed  seen and examined  vs and meds reviewed      Subjective/HPI     PAST MEDICAL & SURGICAL HISTORY:  h/o Smoking: quitted 3/2012  Murmur, cardiac  PAD (peripheral artery disease)  h/o Hepatitis A 1969: currently resolved, no symptoms  CAD (coronary artery disease)  CAD (coronary artery disease)  h/o Myocardial infarct 2007  Depression  h/o Anxiety attack  HTN (hypertension)  Diabetes mellitus II  s/p surgical removal of benign Skin lesion epigastric area  s/p Ovarian cyst removal  coronary stent 2007        Medication list         MEDICATIONS  (STANDING):  ALBUTerol/ipratropium for Nebulization 3 milliLiter(s) Nebulizer every 6 hours  amLODIPine   Tablet 2.5 milliGRAM(s) Oral at bedtime  amLODIPine   Tablet 5 milliGRAM(s) Oral daily  dextrose 5%. 1000 milliLiter(s) (50 mL/Hr) IV Continuous <Continuous>  dextrose 50% Injectable 12.5 Gram(s) IV Push once  dextrose 50% Injectable 25 Gram(s) IV Push once  dextrose 50% Injectable 25 Gram(s) IV Push once  furosemide   Injectable 40 milliGRAM(s) IV Push two times a day  guaiFENesin   Syrup  (Sugar-Free) 100 milliGRAM(s) Oral every 6 hours  heparin  Injectable 5000 Unit(s) SubCutaneous every 12 hours  imipramine 50 milliGRAM(s) Oral daily  insulin glargine Injectable (LANTUS) 18 Unit(s) SubCutaneous at bedtime  insulin lispro (HumaLOG) corrective regimen sliding scale   SubCutaneous three times a day before meals  melatonin 3 milliGRAM(s) Oral at bedtime  metoprolol succinate ER 50 milliGRAM(s) Oral daily  tiotropium 18 MICROgram(s) Capsule 1 Capsule(s) Inhalation daily    MEDICATIONS  (PRN):  dextrose Gel 1 Dose(s) Oral once PRN Blood Glucose LESS THAN 70 milliGRAM(s)/deciLiter  glucagon  Injectable 1 milliGRAM(s) IntraMuscular once PRN Glucose <70 milliGRAM(s)/deciLiter         Vitals log        ICU Vital Signs Last 24 Hrs  T(C): 37.6 (05 May 2018 05:09), Max: 37.6 (05 May 2018 05:09)  T(F): 99.6 (05 May 2018 05:09), Max: 99.6 (05 May 2018 05:09)  HR: 111 (05 May 2018 05:09) (88 - 111)  BP: 158/85 (05 May 2018 05:09) (133/87 - 173/85)  BP(mean): --  ABP: --  ABP(mean): --  RR: 18 (05 May 2018 05:09) (16 - 20)  SpO2: 92% (05 May 2018 05:09) (90% - 100%)           Input and Output:  I&O's Detail    03 May 2018 07:01  -  04 May 2018 07:00  --------------------------------------------------------  IN:    Oral Fluid: 120 mL  Total IN: 120 mL    OUT:    Other: 1000 mL  Total OUT: 1000 mL    Total NET: -880 mL      04 May 2018 07:01  -  05 May 2018 06:08  --------------------------------------------------------  IN:    Other: 800 mL  Total IN: 800 mL    OUT:    Other: 2800 mL  Total OUT: 2800 mL    Total NET: -2000 mL          Lab Data                        10.6   14.6  )-----------( 287      ( 05 May 2018 05:01 )             31.8     05-05    136  |  97  |  30<H>  ----------------------------<  280<H>  3.3<L>   |  29  |  3.20<H>    Ca    8.2<L>      05 May 2018 05:01  Phos  3.9     05-04  Mg     2.2     05-04              Review of Systems	      Objective     Physical Examination    head at  heart s1s2  lung dec BS  abd soft      Pertinent Lab findings & Imaging      Dexter:  NO   Adequate UO     I&O's Detail    03 May 2018 07:01  -  04 May 2018 07:00  --------------------------------------------------------  IN:    Oral Fluid: 120 mL  Total IN: 120 mL    OUT:    Other: 1000 mL  Total OUT: 1000 mL    Total NET: -880 mL      04 May 2018 07:01  -  05 May 2018 06:08  --------------------------------------------------------  IN:    Other: 800 mL  Total IN: 800 mL    OUT:    Other: 2800 mL  Total OUT: 2800 mL    Total NET: -2000 mL               Discussed with:     Cultures:	        Radiology

## 2018-05-05 NOTE — PROGRESS NOTE ADULT - PROBLEM SELECTOR PLAN 2
-DAVIS on CKD  -Permacath placed, had HD  - HOLD NSAIDs, ACE, ARBs   - trend BUN/Cr  - f/u Nephro Dr. Reina

## 2018-05-05 NOTE — PROGRESS NOTE ADULT - ASSESSMENT
66 y/o  female PMHx Type 2 Diabetes on lantus, HTN, CAD s/p stent (2007), Depression, Anxiety, CKD, former smoker presenting with sob.    Pt lying flat more comfortable today.  Still remains with some degree of fluid overload but improved overall.     Tele with brief episode of tachycardia ST vs PAT at 9pm on 5/3.  No further events  Will monitor closely.  Pt with O2 sat of 89% at the time. Cont Tele. If cont may consider starting low dose BB.    echo showed moderate segmental LVSD, EF 30%, elevated filling pressures, MAC, moderate MR, and B/L pleural effusions.    - Monitor electrolytes and replace as needed.  Maintain K > 4 Mag >2  - HD per renal.   - cont metop  - remains on furosemide per nephrology  - Likely multifactorial 2/2 to acute CHF with some component possible of underlying lung disease copd/pna  - Please continue to maintain strict I/Os, monitor daily weights, Cr, and K.   - Continue supplemental oxygen as needed  - Patient can decompensate quickly; would avoid IVF  - BP elevated in ED. Now improving. Continue amlodipine 2.5 mg qhs, amlodipine 5mg qd,   - Continue asa 81mg qd  - Fluid restriction  - DVT prophylaxis  - Other cardiovascular workup will depend on clinical course.  - All other workup per primary team  - Will follow

## 2018-05-05 NOTE — PROGRESS NOTE ADULT - SUBJECTIVE AND OBJECTIVE BOX
Patient is a 67y old  Female who presents with a chief complaint of Dyspnea (03 May 2018 15:26)      INTERVAL HPI/OVERNIGHT EVENTS: 68 y/o  female PMHx Type 2 Diabetes on lantus, HTN, CAD s/p stent (2007), Depression, Anxiety, former smoker brought in by EMS for shortness of breath, admitted with CHF, DAVIS on CKD. pt feels better better  Bs is not controlled.     MEDICATIONS  (STANDING):  ALBUTerol/ipratropium for Nebulization 3 milliLiter(s) Nebulizer every 6 hours  amLODIPine   Tablet 2.5 milliGRAM(s) Oral at bedtime  amLODIPine   Tablet 5 milliGRAM(s) Oral daily  dextrose 5%. 1000 milliLiter(s) (50 mL/Hr) IV Continuous <Continuous>  dextrose 50% Injectable 12.5 Gram(s) IV Push once  dextrose 50% Injectable 25 Gram(s) IV Push once  dextrose 50% Injectable 25 Gram(s) IV Push once  furosemide   Injectable 40 milliGRAM(s) IV Push two times a day  guaiFENesin   Syrup  (Sugar-Free) 100 milliGRAM(s) Oral every 6 hours  heparin  Injectable 5000 Unit(s) SubCutaneous every 12 hours  imipramine 50 milliGRAM(s) Oral daily  insulin glargine Injectable (LANTUS) 21 Unit(s) SubCutaneous at bedtime  insulin lispro (HumaLOG) corrective regimen sliding scale   SubCutaneous three times a day before meals  insulin lispro Injectable (HumaLOG) 5 Unit(s) SubCutaneous three times a day with meals  melatonin 3 milliGRAM(s) Oral at bedtime  metoprolol succinate ER 50 milliGRAM(s) Oral daily  tiotropium 18 MICROgram(s) Capsule 1 Capsule(s) Inhalation daily    MEDICATIONS  (PRN):  dextrose Gel 1 Dose(s) Oral once PRN Blood Glucose LESS THAN 70 milliGRAM(s)/deciLiter  glucagon  Injectable 1 milliGRAM(s) IntraMuscular once PRN Glucose <70 milliGRAM(s)/deciLiter      Allergies    latex (Unknown)  No Known Drug Allergies    Intolerances        REVIEW OF SYSTEMS:  CONSTITUTIONAL: No fever, weight loss, or fatigue  EYES: No eye pain, visual disturbances, or discharge  ENMT:  No difficulty hearing, tinnitus, vertigo; No sinus or throat pain  NECK: No pain or stiffness  BREASTS: No pain, masses, or nipple discharge  RESPIRATORY: No cough, wheezing, chills or hemoptysis; No shortness of breath  CARDIOVASCULAR: No chest pain, palpitations, dizziness, or leg swelling  GASTROINTESTINAL: No abdominal or epigastric pain. No nausea, vomiting, or hematemesis; No diarrhea or constipation. No melena or hematochezia.  GENITOURINARY: No dysuria, frequency, hematuria, or incontinence  NEUROLOGICAL: No headaches, memory loss, loss of strength, numbness, or tremors  SKIN: No itching, burning, rashes, or lesions   LYMPH NODES: No enlarged glands  ENDOCRINE: No heat or cold intolerance; No hair loss; No polydipsia or polyuria  MUSCULOSKELETAL: No joint pain or swelling; No muscle, back, or extremity pain  PSYCHIATRIC: No depression, anxiety, mood swings, or difficulty sleeping  HEME/LYMPH: No easy bruising, or bleeding gums  ALLERGY AND IMMUNOLOGIC: No hives or eczema    Vital Signs Last 24 Hrs  T(C): 36.9 (05 May 2018 16:26), Max: 37.6 (05 May 2018 05:09)  T(F): 98.5 (05 May 2018 16:26), Max: 99.6 (05 May 2018 05:09)  HR: 101 (05 May 2018 16:26) (96 - 111)  BP: 135/81 (05 May 2018 16:26) (129/79 - 158/85)  BP(mean): --  RR: 16 (05 May 2018 12:10) (16 - 18)  SpO2: 98% (05 May 2018 16:26) (92% - 100%)    PHYSICAL EXAM:  GENERAL: NAD, well-groomed, well-developed  HEAD:  Atraumatic, Normocephalic  EYES: EOMI, PERRLA, conjunctiva and sclera clear  ENMT: No tonsillar erythema, exudates, or enlargement; Moist mucous membranes, Good dentition, No lesions  NECK: Supple, No JVD, Normal thyroid  NERVOUS SYSTEM:  Alert & Oriented X3, Good concentration; Motor Strength 5/5 B/L upper and lower extremities; DTRs 2+ intact and symmetric  CHEST/LUNG: Clear to auscultation bilaterally; No rales, rhonchi, wheezing, or rubs  HEART: Regular rate and rhythm; No murmurs, rubs, or gallops  ABDOMEN: Soft, Nontender, Nondistended; Bowel sounds present  EXTREMITIES:  2+ Peripheral Pulses, No clubbing, cyanosis, or edema  LYMPH: No lymphadenopathy noted  SKIN: No rashes or lesions    LABS:                        10.6   14.6  )-----------( 287      ( 05 May 2018 05:01 )             31.8     05 May 2018 05:01    136    |  97     |  30     ----------------------------<  280    3.3     |  29     |  3.20     Ca    8.2        05 May 2018 05:01          CAPILLARY BLOOD GLUCOSE      POCT Blood Glucose.: 324 mg/dL (05 May 2018 16:21)  POCT Blood Glucose.: 450 mg/dL (05 May 2018 13:57)  POCT Blood Glucose.: 548 mg/dL (05 May 2018 11:18)  POCT Blood Glucose.: 271 mg/dL (05 May 2018 07:27)  POCT Blood Glucose.: 220 mg/dL (04 May 2018 21:18)      RADIOLOGY & ADDITIONAL TESTS:    Imaging Personally Reviewed: CXR, no change, permcath stable.  [x ] YES  [ ] NO    Consultant(s) Notes Reviewed:  [x ] YES  [ ] NO    Care Discussed with Consultants/Other Providers [x ] YES  [ ] NO

## 2018-05-06 VITALS
SYSTOLIC BLOOD PRESSURE: 136 MMHG | HEART RATE: 82 BPM | RESPIRATION RATE: 16 BRPM | TEMPERATURE: 99 F | DIASTOLIC BLOOD PRESSURE: 80 MMHG | OXYGEN SATURATION: 94 %

## 2018-05-06 LAB
ANION GAP SERPL CALC-SCNC: 11 MMOL/L — SIGNIFICANT CHANGE UP (ref 5–17)
BUN SERPL-MCNC: 52 MG/DL — HIGH (ref 7–23)
CALCIUM SERPL-MCNC: 8.6 MG/DL — SIGNIFICANT CHANGE UP (ref 8.5–10.1)
CHLORIDE SERPL-SCNC: 97 MMOL/L — SIGNIFICANT CHANGE UP (ref 96–108)
CO2 SERPL-SCNC: 28 MMOL/L — SIGNIFICANT CHANGE UP (ref 22–31)
CREAT SERPL-MCNC: 4.3 MG/DL — HIGH (ref 0.5–1.3)
CULTURE RESULTS: SIGNIFICANT CHANGE UP
CULTURE RESULTS: SIGNIFICANT CHANGE UP
GLUCOSE SERPL-MCNC: 158 MG/DL — HIGH (ref 70–99)
HCT VFR BLD CALC: 28.1 % — LOW (ref 34.5–45)
HGB BLD-MCNC: 9.9 G/DL — LOW (ref 11.5–15.5)
MCHC RBC-ENTMCNC: 30.3 PG — SIGNIFICANT CHANGE UP (ref 27–34)
MCHC RBC-ENTMCNC: 35.3 GM/DL — SIGNIFICANT CHANGE UP (ref 32–36)
MCV RBC AUTO: 85.9 FL — SIGNIFICANT CHANGE UP (ref 80–100)
PLATELET # BLD AUTO: 241 K/UL — SIGNIFICANT CHANGE UP (ref 150–400)
POTASSIUM SERPL-MCNC: 3.7 MMOL/L — SIGNIFICANT CHANGE UP (ref 3.5–5.3)
POTASSIUM SERPL-SCNC: 3.7 MMOL/L — SIGNIFICANT CHANGE UP (ref 3.5–5.3)
RBC # BLD: 3.27 M/UL — LOW (ref 3.8–5.2)
RBC # FLD: 14.4 % — SIGNIFICANT CHANGE UP (ref 10.3–14.5)
SODIUM SERPL-SCNC: 136 MMOL/L — SIGNIFICANT CHANGE UP (ref 135–145)
SPECIMEN SOURCE: SIGNIFICANT CHANGE UP
SPECIMEN SOURCE: SIGNIFICANT CHANGE UP
WBC # BLD: 13.6 K/UL — HIGH (ref 3.8–10.5)
WBC # FLD AUTO: 13.6 K/UL — HIGH (ref 3.8–10.5)

## 2018-05-06 PROCEDURE — 99232 SBSQ HOSP IP/OBS MODERATE 35: CPT

## 2018-05-06 PROCEDURE — 99239 HOSP IP/OBS DSCHRG MGMT >30: CPT

## 2018-05-06 RX ORDER — TIOTROPIUM BROMIDE 18 UG/1
1 CAPSULE ORAL; RESPIRATORY (INHALATION)
Qty: 1 | Refills: 0 | OUTPATIENT
Start: 2018-05-06 | End: 2018-06-04

## 2018-05-06 RX ORDER — METOPROLOL TARTRATE 50 MG
75 TABLET ORAL DAILY
Qty: 0 | Refills: 0 | Status: DISCONTINUED | OUTPATIENT
Start: 2018-05-06 | End: 2018-05-06

## 2018-05-06 RX ORDER — METOPROLOL TARTRATE 50 MG
1.5 TABLET ORAL
Qty: 45 | Refills: 0
Start: 2018-05-06 | End: 2018-06-04

## 2018-05-06 RX ORDER — METOPROLOL TARTRATE 50 MG
1 TABLET ORAL
Qty: 0 | Refills: 0 | COMMUNITY

## 2018-05-06 RX ADMIN — Medication 2: at 08:34

## 2018-05-06 RX ADMIN — Medication 5 UNIT(S): at 12:15

## 2018-05-06 RX ADMIN — Medication 100 MILLIGRAM(S): at 11:22

## 2018-05-06 RX ADMIN — Medication 50 MILLIGRAM(S): at 05:17

## 2018-05-06 RX ADMIN — HEPARIN SODIUM 5000 UNIT(S): 5000 INJECTION INTRAVENOUS; SUBCUTANEOUS at 05:17

## 2018-05-06 RX ADMIN — AMLODIPINE BESYLATE 5 MILLIGRAM(S): 2.5 TABLET ORAL at 05:17

## 2018-05-06 RX ADMIN — Medication 40 MILLIGRAM(S): at 05:17

## 2018-05-06 RX ADMIN — Medication 6: at 12:15

## 2018-05-06 RX ADMIN — Medication 3 MILLILITER(S): at 07:37

## 2018-05-06 RX ADMIN — Medication 5 UNIT(S): at 08:34

## 2018-05-06 RX ADMIN — Medication 50 MILLIGRAM(S): at 11:21

## 2018-05-06 NOTE — PROGRESS NOTE ADULT - SUBJECTIVE AND OBJECTIVE BOX
Patient is a 67y Female whom presented to the hospital with esrd required hd and sob due to maney years poor dm controlled with hga1c above 10 , s/p perm cath now   no fever no chills no abd pain , feeling better awaiting for dc     seen early am     PAST MEDICAL & SURGICAL HISTORY:  h/o Smoking: quitted 3/2012  Murmur, cardiac  PAD (peripheral artery disease)  h/o Hepatitis A 1969: currently resolved, no symptoms  CAD (coronary artery disease)  h/o Myocardial infarct 2007  Depression  h/o Anxiety attack  HTN (hypertension)  Diabetes mellitus II  s/p surgical removal of benign Skin lesion epigastric area  s/p Ovarian cyst removal  coronary stent 2007    Constitutional: No fever, fatigue or weight loss.  Skin: No rash.  Eyes: No recent vision problems or eye pain.  ENT: No congestion, ear pain, or sore throat.  Endocrine: No thyroid problems.  Cardiovascular: No chest pain or palpation.  Respiratory: No cough, shortness of breath, congestion, or wheezing.  Gastrointestinal: No abdominal pain, nausea, vomiting, or diarrhea.  Genitourinary: No dysuria.  Musculoskeletal: No joint swelling.  Neurologic: No headache.        MEDICATIONS  (STANDING):  amLODIPine   Tablet 2.5 milliGRAM(s) Oral at bedtime  amLODIPine   Tablet 5 milliGRAM(s) Oral daily  aspirin enteric coated 81 milliGRAM(s) Oral daily  dextrose 5%. 1000 milliLiter(s) (50 mL/Hr) IV Continuous <Continuous>  dextrose 50% Injectable 12.5 Gram(s) IV Push once  dextrose 50% Injectable 25 Gram(s) IV Push once  dextrose 50% Injectable 25 Gram(s) IV Push once  guaiFENesin   Syrup  (Sugar-Free) 100 milliGRAM(s) Oral every 6 hours  heparin  Injectable 5000 Unit(s) SubCutaneous every 12 hours  imipramine 50 milliGRAM(s) Oral daily  insulin glargine Injectable (LANTUS) 7 Unit(s) SubCutaneous at bedtime  insulin lispro (HumaLOG) corrective regimen sliding scale   SubCutaneous three times a day before meals  metoprolol succinate ER 50 milliGRAM(s) Oral daily  sodium chloride 0.9%. 1000 milliLiter(s) (60 mL/Hr) IV Continuous <Continuous>  tiotropium 18 MICROgram(s) Capsule 1 Capsule(s) Inhalation daily                              9.9    13.6  )-----------( 241      ( 06 May 2018 05:51 )             28.1       CBC Full  -  ( 06 May 2018 05:51 )  WBC Count : 13.6 K/uL  Hemoglobin : 9.9 g/dL  Hematocrit : 28.1 %  Platelet Count - Automated : 241 K/uL  Mean Cell Volume : 85.9 fl  Mean Cell Hemoglobin : 30.3 pg  Mean Cell Hemoglobin Concentration : 35.3 gm/dL  Auto Neutrophil # : x  Auto Lymphocyte # : x  Auto Monocyte # : x  Auto Eosinophil # : x  Auto Basophil # : x  Auto Neutrophil % : x  Auto Lymphocyte % : x  Auto Monocyte % : x  Auto Eosinophil % : x  Auto Basophil % : x      05-06    136  |  97  |  52<H>  ----------------------------<  158<H>  3.7   |  28  |  4.30<H>    Ca    8.6      06 May 2018 05:51        CAPILLARY BLOOD GLUCOSE      POCT Blood Glucose.: 299 mg/dL (06 May 2018 11:23)  POCT Blood Glucose.: 171 mg/dL (06 May 2018 07:30)  POCT Blood Glucose.: 430 mg/dL (05 May 2018 21:27)  POCT Blood Glucose.: 324 mg/dL (05 May 2018 16:21)      Vital Signs Last 24 Hrs  T(C): 37.2 (06 May 2018 12:10), Max: 37.3 (06 May 2018 04:24)  T(F): 98.9 (06 May 2018 12:10), Max: 99.2 (06 May 2018 08:00)  HR: 82 (06 May 2018 12:10) (82 - 110)  BP: 136/80 (06 May 2018 12:10) (108/65 - 167/84)  BP(mean): --  RR: 16 (06 May 2018 12:10) (16 - 18)  SpO2: 94% (06 May 2018 12:10) (94% - 100%)                                            PHYSICAL EXAM:    Constitutional: NAD  HEENT: conjunctive   clear   Neck:  No JVD  Respiratory: CTAB  Cardiovascular: S1 and S2  Gastrointestinal: BS+, soft, NT/ND  Extremities: pos  peripheral edema  Neurological: A/O x 3, no focal deficits  Psychiatric: Normal mood, normal affect  : No Renteria  Skin: dry

## 2018-05-06 NOTE — PROGRESS NOTE ADULT - ASSESSMENT
68 y/o  female PMHx Type 2 Diabetes on lantus, HTN, CAD s/p stent (2007), Depression, Anxiety, CKD, former smoker presenting with sob.    Pt lying flat more comfortable today.  Still remains with some degree of fluid overload but improved overall.     Tele with brief episode of tachycardia ST vs PAT at 9pm on 5/3.  No further events  Will monitor closely.  Pt with O2 sat of 89% at the time. Cont Tele. If cont may consider starting low dose BB.    echo showed moderate segmental LVSD, EF 30%, elevated filling pressures, MAC, moderate MR, and B/L pleural effusions.    - Monitor electrolytes and replace as needed.  Maintain K > 4 Mag >2  - HD per renal s/p Perma cath placement  - HR remains 90s to 100s on tele and flow sheet. Will increase metop 50mg qd to 75mg for better rate control.  Monitor hemodynamics closely  - remains on furosemide per nephrology  - Likely multifactorial 2/2 to acute CHF with some component possible of underlying lung disease copd/pna  - Please continue to maintain strict I/Os, monitor daily weights, Cr, and K.   - uncontrolled BS yesterday - managed by medicine better today   - Continue supplemental oxygen as needed  - Patient can decompensate quickly; would avoid IVF  - BP elevated in ED. Now improving. Continue amlodipine 2.5 mg qhs, amlodipine 5mg qd,   - Continue asa 81mg qd  - Fluid restriction  - DVT prophylaxis  - Other cardiovascular workup will depend on clinical course.  - All other workup per primary team  - Will follow     Kady Eubanks NP-C  Cardiology 68 y/o  female PMHx Type 2 Diabetes on lantus, HTN, CAD s/p stent (2007), Depression, Anxiety, CKD, former smoker presenting with sob.     - uncontrolled BS yesterday - managed by medicine better today  - Pt lying flat more comfortable today. Still remains with some degree of fluid overload but improved overall.    - Tele with brief episode of tachycardia ST vs PAT at 9pm on 5/3.  ST 90s-100s cont started on Toprol 50mg qd will increase to 75mg for better rate control    - echo showed moderate segmental LVSD, EF 30%, elevated filling pressures, MAC, moderate MR, and B/L pleural effusions.  - Monitor electrolytes and replace as needed.  Maintain K > 4 Mag >2  - HD per renal s/p Perma cath placement  - Monitor hemodynamics  - remains on furosemide per nephrology  - Likely multifactorial 2/2 to acute CHF with some component possible of underlying lung disease copd/pna  - Please continue to maintain strict I/Os, monitor daily weights, Cr, and K.   - Continue supplemental oxygen as needed  - Patient can decompensate quickly; would avoid IVF  - BP elevated in ED. Now improving. Continue amlodipine 2.5 mg qhs, amlodipine 5mg qd,   - Continue asa 81mg qd  - Fluid restriction  - DVT prophylaxis  - Other cardiovascular workup will depend on clinical course.  - All other workup per primary team  - Will follow   - Discharge planning as per primary team    Kady Eubanks NP-C  Cardiology

## 2018-05-06 NOTE — PROGRESS NOTE ADULT - SUBJECTIVE AND OBJECTIVE BOX
[facility] -- Lorraine Gruber, Dee Larry Pannella, Patel, Savella  Office # [phone]      Follow Up:      Subjective/Observations:       REVIEW OF SYSTEMS: All other review of systems is negative unless indicated above    PAST MEDICAL & SURGICAL HISTORY:  h/o Smoking: quitted 3/2012  Murmur, cardiac  PAD (peripheral artery disease)  h/o Hepatitis A 1969: currently resolved, no symptoms  CAD (coronary artery disease)  h/o Myocardial infarct 2007  Depression  h/o Anxiety attack  HTN (hypertension)  Diabetes mellitus II  s/p surgical removal of benign Skin lesion epigastric area  s/p Ovarian cyst removal  coronary stent 2007      MEDICATIONS  (STANDING):  ALBUTerol/ipratropium for Nebulization 3 milliLiter(s) Nebulizer every 6 hours  amLODIPine   Tablet 2.5 milliGRAM(s) Oral at bedtime  amLODIPine   Tablet 5 milliGRAM(s) Oral daily  dextrose 5%. 1000 milliLiter(s) (50 mL/Hr) IV Continuous <Continuous>  dextrose 50% Injectable 12.5 Gram(s) IV Push once  dextrose 50% Injectable 25 Gram(s) IV Push once  dextrose 50% Injectable 25 Gram(s) IV Push once  furosemide   Injectable 40 milliGRAM(s) IV Push two times a day  guaiFENesin   Syrup  (Sugar-Free) 100 milliGRAM(s) Oral every 6 hours  heparin  Injectable 5000 Unit(s) SubCutaneous every 12 hours  imipramine 50 milliGRAM(s) Oral daily  insulin glargine Injectable (LANTUS) 21 Unit(s) SubCutaneous at bedtime  insulin lispro (HumaLOG) corrective regimen sliding scale   SubCutaneous at bedtime  insulin lispro (HumaLOG) corrective regimen sliding scale   SubCutaneous three times a day before meals  insulin lispro Injectable (HumaLOG) 5 Unit(s) SubCutaneous three times a day with meals  melatonin 3 milliGRAM(s) Oral at bedtime  metoprolol succinate ER 50 milliGRAM(s) Oral daily  tiotropium 18 MICROgram(s) Capsule 1 Capsule(s) Inhalation daily    MEDICATIONS  (PRN):  dextrose Gel 1 Dose(s) Oral once PRN Blood Glucose LESS THAN 70 milliGRAM(s)/deciLiter  glucagon  Injectable 1 milliGRAM(s) IntraMuscular once PRN Glucose <70 milliGRAM(s)/deciLiter      Allergies    latex (Unknown)  No Known Drug Allergies    Intolerances            Vital Signs Last 24 Hrs  T(C): 37.3 (06 May 2018 08:00), Max: 37.3 (06 May 2018 04:24)  T(F): 99.2 (06 May 2018 08:00), Max: 99.2 (06 May 2018 08:00)  HR: 108 (06 May 2018 08:00) (96 - 110)  BP: 108/65 (06 May 2018 08:00) (108/65 - 167/84)  BP(mean): --  RR: 16 (06 May 2018 08:00) (16 - 18)  SpO2: 97% (06 May 2018 08:00) (97% - 100%)    I&O's Summary        PHYSICAL EXAM:  TELE:   Constitutional: NAD, awake and alert, well-developed  HEENT: Moist Mucous Membranes, Anicteric  Pulmonary: Non-labored, breath sounds are clear bilaterally, No wheezing, rales or rhonchi  Cardiovascular: Regular, S1 and S2, No murmurs, rubs, gallops or clicks  Gastrointestinal: Bowel Sounds present, soft, nontender.   Lymph: No peripheral edema. No lymphadenopathy.  Skin: No visible rashes or ulcers.  Psych:  Mood & affect appropriate    LABS: All Labs Reviewed:                        9.9    13.6  )-----------( 241      ( 06 May 2018 05:51 )             28.1                         10.6   14.6  )-----------( 287      ( 05 May 2018 05:01 )             31.8                         10.2   17.1  )-----------( 372      ( 04 May 2018 06:41 )             30.4     06 May 2018 05:51    136    |  97     |  52     ----------------------------<  158    3.7     |  28     |  4.30   05 May 2018 05:01    136    |  97     |  30     ----------------------------<  280    3.3     |  29     |  3.20   04 May 2018 06:41    138    |  99     |  41     ----------------------------<  204    3.7     |  29     |  3.70     Ca    8.6        06 May 2018 05:51  Ca    8.2        05 May 2018 05:01  Ca    8.4        04 May 2018 06:41  Phos  3.9       04 May 2018 06:41  Mg     2.2       04 May 2018 06:41 [facility] -- Lorraine Gruber, Dee Larry, Joseph Carson Savella  Office # [phone]      Follow Up:  Shortness of breath/Cough    Subjective/Observations: Pt seen and examined.  Lying flat in be no reports of cp, sob, palpitations or lightheadedness.  No signs of orthopnea or PND.        REVIEW OF SYSTEMS: All other review of systems is negative unless indicated above    PAST MEDICAL & SURGICAL HISTORY:  h/o Smoking: quitted 3/2012  Murmur, cardiac  PAD (peripheral artery disease)  h/o Hepatitis A 1969: currently resolved, no symptoms  CAD (coronary artery disease)  h/o Myocardial infarct 2007  Depression  h/o Anxiety attack  HTN (hypertension)  Diabetes mellitus II  s/p surgical removal of benign Skin lesion epigastric area  s/p Ovarian cyst removal  coronary stent 2007      MEDICATIONS  (STANDING):  ALBUTerol/ipratropium for Nebulization 3 milliLiter(s) Nebulizer every 6 hours  amLODIPine   Tablet 2.5 milliGRAM(s) Oral at bedtime  amLODIPine   Tablet 5 milliGRAM(s) Oral daily  dextrose 5%. 1000 milliLiter(s) (50 mL/Hr) IV Continuous <Continuous>  dextrose 50% Injectable 12.5 Gram(s) IV Push once  dextrose 50% Injectable 25 Gram(s) IV Push once  dextrose 50% Injectable 25 Gram(s) IV Push once  furosemide   Injectable 40 milliGRAM(s) IV Push two times a day  guaiFENesin   Syrup  (Sugar-Free) 100 milliGRAM(s) Oral every 6 hours  heparin  Injectable 5000 Unit(s) SubCutaneous every 12 hours  imipramine 50 milliGRAM(s) Oral daily  insulin glargine Injectable (LANTUS) 21 Unit(s) SubCutaneous at bedtime  insulin lispro (HumaLOG) corrective regimen sliding scale   SubCutaneous at bedtime  insulin lispro (HumaLOG) corrective regimen sliding scale   SubCutaneous three times a day before meals  insulin lispro Injectable (HumaLOG) 5 Unit(s) SubCutaneous three times a day with meals  melatonin 3 milliGRAM(s) Oral at bedtime  metoprolol succinate ER 50 milliGRAM(s) Oral daily  tiotropium 18 MICROgram(s) Capsule 1 Capsule(s) Inhalation daily    MEDICATIONS  (PRN):  dextrose Gel 1 Dose(s) Oral once PRN Blood Glucose LESS THAN 70 milliGRAM(s)/deciLiter  glucagon  Injectable 1 milliGRAM(s) IntraMuscular once PRN Glucose <70 milliGRAM(s)/deciLiter      Allergies    latex (Unknown)  No Known Drug Allergies    Intolerances            Vital Signs Last 24 Hrs  T(C): 37.3 (06 May 2018 08:00), Max: 37.3 (06 May 2018 04:24)  T(F): 99.2 (06 May 2018 08:00), Max: 99.2 (06 May 2018 08:00)  HR: 108 (06 May 2018 08:00) (96 - 110)  BP: 108/65 (06 May 2018 08:00) (108/65 - 167/84)  BP(mean): --  RR: 16 (06 May 2018 08:00) (16 - 18)  SpO2: 97% (06 May 2018 08:00) (97% - 100%)    I&O's Summary        PHYSICAL EXAM:  TELE: [id number] no events  Constitutional: NAD, awake and alert, well-developed  HEENT: Moist Mucous Membranes, Anicteric  Pulmonary: Non-labored, breath sounds are clear bilaterally, No wheezing, rales or rhonchi  Cardiovascular: Regular, tachy S1 and S2, No murmurs, rubs, gallops or clicks  Gastrointestinal: Bowel Sounds present, soft, nontender.   Lymph: No peripheral edema. No lymphadenopathy.  Skin: No visible rashes or ulcers.  Psych:  Mood & affect appropriate    LABS: All Labs Reviewed:                        9.9    13.6  )-----------( 241      ( 06 May 2018 05:51 )             28.1                         10.6   14.6  )-----------( 287      ( 05 May 2018 05:01 )             31.8                         10.2   17.1  )-----------( 372      ( 04 May 2018 06:41 )             30.4     06 May 2018 05:51    136    |  97     |  52     ----------------------------<  158    3.7     |  28     |  4.30   05 May 2018 05:01    136    |  97     |  30     ----------------------------<  280    3.3     |  29     |  3.20   04 May 2018 06:41    138    |  99     |  41     ----------------------------<  204    3.7     |  29     |  3.70     Ca    8.6        06 May 2018 05:51  Ca    8.2        05 May 2018 05:01  Ca    8.4        04 May 2018 06:41  Phos  3.9       04 May 2018 06:41  Mg     2.2       04 May 2018 06:41    < from: TTE Echo Doppler w/o Cont (05.01.18 @ 11:08) >   EXAM:  ECHO TTE W/O CON COMP W/DOPPLR         PROCEDURE DATE:  05/01/2018        INTERPRETATION:  INDICATION: Shortness of breath    Blood Pressure 160/87    Height 177.8     Weight 69       BSA 1.86    Dimensions:    LA 3.0       Normal Values: 2.0 - 4.0 cm    Ao 3.3        Normal Values: 2.0 - 3.8 cm  SEPTUM 1.1       Normal Values: 0.6 - 1.2 cm  PWT 1.0       Normal Values: 0.6 - 1.1 cm  LVIDd 4.6         Normal Values: 3.0 - 5.6 cm  LVIDs 3.9         Normal Values: 1.8 - 4.0 cm    Derived Variables:  LVMI     g/m2  RWT      Fractional Short      Ejection Fraction 30    Doppler Peak v. AoV=   (m/sec)    OBSERVATIONS:    Mitral Valve: MAC, moderate MR.  Aortic Valve/Aorta: Ossified trileaflet aortic valve.  Tricuspid Valve: normal with trace TR.  Pulmonic Valve: normal  Left Atrium: normal  Right Atrium: normal  Left Ventricle: The left ventricle appears to be normal in size with   moderate segmental dysfunction, estimated LVEF of 30%. The inferior,   posterior and lateral walls are all severely hypokinetic to akinetic. The   basal inferior wall appears thinned and aneurysmal. The apex is   hypokinetic, as is the distal anteroseptum. No apical thrombus is   identified.   Right Ventricle: normal size and systolic function.  Pericardium/Pleura: Bilateral pleural effusion, no significant   pericardial effusion.  Pulmonary/RV Pressure: Inadequate TR jet to estimate PA systolic pressure.  LV Diastolic Function:  E:E' is consistent with elevated left heart   filling pressure.    The left ventricle appears to be normal in size with moderate segmental   dysfunction, estimated LVEF of 30%. The inferior, posterior and lateral   walls are all severely hypokinetic to akinetic. The basal inferior wall   appears thinned and aneurysmal.The apex is hypokinetic, as is the distal   anteroseptum. No apical thrombus is identified. Normal right ventricular   size and systolic function.  E:E' is consistent with elevated left heart   filling pressure. Normal biatrial size. The aortic root is normal in   size. Mac, moderate MR. The aortic valve is calcified without stenosis or   insufficiency. Trace physiologic TR. Inadequate TR jet to estimate PA   systolic pressure. Bilateral pleural effusions. No significant   pericardial effusion.                 MILENA YEBOAH M.D., ATTENDING CARDIOLOGIST  This document has been electronically signed. May  2 2018  1:41PM          < end of copied text >      < from: Xray Fluoro up to 1 Hr in OR (05.04.18 @ 10:54) >  EXAM:  IN O R FLUOROSCOPY                            PROCEDURE DATE:  05/04/2018          INTERPRETATION:  Imaging guidance was provided by the Department of   Radiology for a procedure performed by a physician from another clinical   department. This study was performed and will be interpreted by that   physician and therefore the department of radiology will not render an   interpretation of these images.    This report was generated by an  in   the department of radiology.                DEPARTMENT RADIOLOGY   This document has been electronically signed. May  4 2018  7:28PM    < end of copied text >  < from: Xray Chest 1 View- PORTABLE-Urgent (05.03.18 @ 10:51) >  EXAM:  XR CHEST PORTABLE URGENT 1V                            PROCEDURE DATE:  05/03/2018          INTERPRETATION:  Follow-up.    AP chest. Prior dated 5/2/2018.  Right dialysis catheter reidentified in situ. No change heart   mediastinum. No significant change in bibasilar airspace disease and   effusions. No pneumothorax or other new abnormality.    Impression:  Essentially stable exam.                NICHOLAS FRANCE M.D., ATTENDING RADIOLOGIST  This document has been electronically signed. May  3 2018 10:58AM    < end of copied text >

## 2018-05-06 NOTE — PROGRESS NOTE ADULT - ATTENDING COMMENTS
Seen/examined. Agree with above. Remains on IV lasix. Echo with mod LV dysfunction.
Chart reviewed    Patient seen and examined    Agree with plan as outlined above
Chart reviewed    Patient seen and examined    Agree with plan as outlined above
Pt. seen and evaluated for acute systolic CHF exacerbation and fluid overload.  Pt. reported improvement of her respiratory status.  To have permacath placement today.  Physical examination as above.     Plan:  -Acute systolic CHF exacerbation/Fluid overload:  Continue Lasix 40mg IV BID and Toprol XL 50mg PO daily.  Further management of fluid status via HD.  No ACEI or ARB 2/2 DAVIS.  Cardiology f/u  -Acute on CKD:  for permacath placement today.  Continue HD per Nephrology  -Anemia:  stable.  Will continue to monitor.    -Leukocytosis:  Pt. is afebrile and hemodynamically stable.  Possibly secondary to steroids given during admission.    -HTN:  continue Toprol XL and Norvasc  -DM:  continue Lantus 18 units SQ QHS and humalog sliding scale  -Depression:  Continue imipramine 50mg PO daily  -VTE ppx:  heparin 5000 units SQ Q12h

## 2018-05-06 NOTE — PHYSICAL THERAPY INITIAL EVALUATION ADULT - THERAPY FREQUENCY, PT EVAL
Pt is D/C from physical therapy services secondary to Independent with all mobility.  Will no longer follow.

## 2018-05-06 NOTE — PROGRESS NOTE ADULT - PROVIDER SPECIALTY LIST ADULT
Anesthesia
Cardiology
Hospitalist
Nephrology
Pulmonology
Pulmonology
Cardiology
Cardiology
Pulmonology
Pulmonology
Hospitalist
Hospitalist

## 2018-05-19 ENCOUNTER — OUTPATIENT (OUTPATIENT)
Dept: OUTPATIENT SERVICES | Facility: HOSPITAL | Age: 68
LOS: 1 days | End: 2018-05-19
Payer: MEDICARE

## 2018-05-19 DIAGNOSIS — D63.1 ANEMIA IN CHRONIC KIDNEY DISEASE: ICD-10-CM

## 2018-05-19 LAB
ABO RH CONFIRMATION: SIGNIFICANT CHANGE UP
BLD GP AB SCN SERPL QL: SIGNIFICANT CHANGE UP
HCT VFR BLD CALC: 26.1 % — LOW (ref 34.5–45)
HGB BLD-MCNC: 8.4 G/DL — LOW (ref 11.5–15.5)
MCHC RBC-ENTMCNC: 28.7 PG — SIGNIFICANT CHANGE UP (ref 27–34)
MCHC RBC-ENTMCNC: 32.2 GM/DL — SIGNIFICANT CHANGE UP (ref 32–36)
MCV RBC AUTO: 89.2 FL — SIGNIFICANT CHANGE UP (ref 80–100)
PLATELET # BLD AUTO: 352 K/UL — SIGNIFICANT CHANGE UP (ref 150–400)
RBC # BLD: 2.92 M/UL — LOW (ref 3.8–5.2)
RBC # FLD: 13.4 % — SIGNIFICANT CHANGE UP (ref 10.3–14.5)
WBC # BLD: 6.4 K/UL — SIGNIFICANT CHANGE UP (ref 3.8–10.5)
WBC # FLD AUTO: 6.4 K/UL — SIGNIFICANT CHANGE UP (ref 3.8–10.5)

## 2018-05-19 PROCEDURE — 86901 BLOOD TYPING SEROLOGIC RH(D): CPT

## 2018-05-19 PROCEDURE — 86850 RBC ANTIBODY SCREEN: CPT

## 2018-05-19 PROCEDURE — 86923 COMPATIBILITY TEST ELECTRIC: CPT

## 2018-05-19 PROCEDURE — 86900 BLOOD TYPING SEROLOGIC ABO: CPT

## 2018-05-19 PROCEDURE — 85027 COMPLETE CBC AUTOMATED: CPT

## 2018-05-19 PROCEDURE — 36430 TRANSFUSION BLD/BLD COMPNT: CPT

## 2018-05-19 PROCEDURE — P9016: CPT

## 2018-05-19 RX ORDER — DIPHENHYDRAMINE HCL 50 MG
25 CAPSULE ORAL ONCE
Qty: 0 | Refills: 0 | Status: COMPLETED | OUTPATIENT
Start: 2018-05-19 | End: 2018-05-19

## 2018-05-19 RX ORDER — ACETAMINOPHEN 500 MG
650 TABLET ORAL ONCE
Qty: 0 | Refills: 0 | Status: COMPLETED | OUTPATIENT
Start: 2018-05-19 | End: 2018-05-19

## 2018-05-19 RX ADMIN — Medication 25 MILLIGRAM(S): at 13:27

## 2018-05-19 RX ADMIN — Medication 650 MILLIGRAM(S): at 13:27

## 2018-05-23 PROCEDURE — 84443 ASSAY THYROID STIM HORMONE: CPT

## 2018-05-23 PROCEDURE — 96372 THER/PROPH/DIAG INJ SC/IM: CPT | Mod: 59

## 2018-05-23 PROCEDURE — 97161 PT EVAL LOW COMPLEX 20 MIN: CPT

## 2018-05-23 PROCEDURE — C8929: CPT

## 2018-05-23 PROCEDURE — 82962 GLUCOSE BLOOD TEST: CPT

## 2018-05-23 PROCEDURE — 76000 FLUOROSCOPY <1 HR PHYS/QHP: CPT

## 2018-05-23 PROCEDURE — 80074 ACUTE HEPATITIS PANEL: CPT

## 2018-05-23 PROCEDURE — 82728 ASSAY OF FERRITIN: CPT

## 2018-05-23 PROCEDURE — C1894: CPT

## 2018-05-23 PROCEDURE — 80307 DRUG TEST PRSMV CHEM ANLYZR: CPT

## 2018-05-23 PROCEDURE — 96375 TX/PRO/DX INJ NEW DRUG ADDON: CPT

## 2018-05-23 PROCEDURE — 88300 SURGICAL PATH GROSS: CPT

## 2018-05-23 PROCEDURE — 87517 HEPATITIS B DNA QUANT: CPT

## 2018-05-23 PROCEDURE — 82607 VITAMIN B-12: CPT

## 2018-05-23 PROCEDURE — 93005 ELECTROCARDIOGRAM TRACING: CPT

## 2018-05-23 PROCEDURE — 99261: CPT

## 2018-05-23 PROCEDURE — 86704 HEP B CORE ANTIBODY TOTAL: CPT

## 2018-05-23 PROCEDURE — 86706 HEP B SURFACE ANTIBODY: CPT

## 2018-05-23 PROCEDURE — 94760 N-INVAS EAR/PLS OXIMETRY 1: CPT

## 2018-05-23 PROCEDURE — 87086 URINE CULTURE/COLONY COUNT: CPT

## 2018-05-23 PROCEDURE — 84480 ASSAY TRIIODOTHYRONINE (T3): CPT

## 2018-05-23 PROCEDURE — 93306 TTE W/DOPPLER COMPLETE: CPT

## 2018-05-23 PROCEDURE — 83036 HEMOGLOBIN GLYCOSYLATED A1C: CPT

## 2018-05-23 PROCEDURE — 71045 X-RAY EXAM CHEST 1 VIEW: CPT

## 2018-05-23 PROCEDURE — 84436 ASSAY OF TOTAL THYROXINE: CPT

## 2018-05-23 PROCEDURE — 94640 AIRWAY INHALATION TREATMENT: CPT

## 2018-05-23 PROCEDURE — 81001 URINALYSIS AUTO W/SCOPE: CPT

## 2018-05-23 PROCEDURE — 84145 PROCALCITONIN (PCT): CPT

## 2018-05-23 PROCEDURE — 82746 ASSAY OF FOLIC ACID SERUM: CPT

## 2018-05-23 PROCEDURE — 85027 COMPLETE CBC AUTOMATED: CPT

## 2018-05-23 PROCEDURE — 80053 COMPREHEN METABOLIC PANEL: CPT

## 2018-05-23 PROCEDURE — 84100 ASSAY OF PHOSPHORUS: CPT

## 2018-05-23 PROCEDURE — 71250 CT THORAX DX C-: CPT

## 2018-05-23 PROCEDURE — 85610 PROTHROMBIN TIME: CPT

## 2018-05-23 PROCEDURE — 83605 ASSAY OF LACTIC ACID: CPT

## 2018-05-23 PROCEDURE — 96376 TX/PRO/DX INJ SAME DRUG ADON: CPT

## 2018-05-23 PROCEDURE — 96374 THER/PROPH/DIAG INJ IV PUSH: CPT

## 2018-05-23 PROCEDURE — 99285 EMERGENCY DEPT VISIT HI MDM: CPT | Mod: 25

## 2018-05-23 PROCEDURE — 87040 BLOOD CULTURE FOR BACTERIA: CPT

## 2018-05-23 PROCEDURE — 80048 BASIC METABOLIC PNL TOTAL CA: CPT

## 2018-05-23 PROCEDURE — 83735 ASSAY OF MAGNESIUM: CPT

## 2018-05-23 PROCEDURE — 83550 IRON BINDING TEST: CPT

## 2018-05-23 PROCEDURE — C1750: CPT

## 2018-05-23 PROCEDURE — 83880 ASSAY OF NATRIURETIC PEPTIDE: CPT

## 2018-06-22 ENCOUNTER — EMERGENCY (EMERGENCY)
Facility: HOSPITAL | Age: 68
LOS: 1 days | Discharge: HOME | End: 2018-06-22
Attending: EMERGENCY MEDICINE
Payer: MEDICARE

## 2018-06-22 VITALS
RESPIRATION RATE: 16 BRPM | DIASTOLIC BLOOD PRESSURE: 68 MMHG | TEMPERATURE: 98 F | HEART RATE: 80 BPM | SYSTOLIC BLOOD PRESSURE: 122 MMHG | OXYGEN SATURATION: 98 %

## 2018-06-22 VITALS
SYSTOLIC BLOOD PRESSURE: 118 MMHG | RESPIRATION RATE: 16 BRPM | DIASTOLIC BLOOD PRESSURE: 75 MMHG | HEART RATE: 89 BPM | WEIGHT: 132.94 LBS | OXYGEN SATURATION: 100 % | TEMPERATURE: 99 F

## 2018-06-22 LAB
ALBUMIN SERPL ELPH-MCNC: 3.7 G/DL — SIGNIFICANT CHANGE UP (ref 3.3–5)
ALP SERPL-CCNC: 72 U/L — SIGNIFICANT CHANGE UP (ref 40–120)
ALT FLD-CCNC: 21 U/L — SIGNIFICANT CHANGE UP (ref 12–78)
ANION GAP SERPL CALC-SCNC: 8 MMOL/L — SIGNIFICANT CHANGE UP (ref 5–17)
APTT BLD: 27.3 SEC — LOW (ref 27.5–37.4)
AST SERPL-CCNC: 41 U/L — HIGH (ref 15–37)
BASOPHILS # BLD AUTO: 0.06 K/UL — SIGNIFICANT CHANGE UP (ref 0–0.2)
BASOPHILS NFR BLD AUTO: 0.8 % — SIGNIFICANT CHANGE UP (ref 0–2)
BILIRUB SERPL-MCNC: 0.5 MG/DL — SIGNIFICANT CHANGE UP (ref 0.2–1.2)
BUN SERPL-MCNC: 23 MG/DL — SIGNIFICANT CHANGE UP (ref 7–23)
CALCIUM SERPL-MCNC: 12.7 MG/DL — HIGH (ref 8.5–10.1)
CHLORIDE SERPL-SCNC: 96 MMOL/L — SIGNIFICANT CHANGE UP (ref 96–108)
CO2 SERPL-SCNC: 29 MMOL/L — SIGNIFICANT CHANGE UP (ref 22–31)
CREAT SERPL-MCNC: 5.6 MG/DL — HIGH (ref 0.5–1.3)
EOSINOPHIL # BLD AUTO: 0.31 K/UL — SIGNIFICANT CHANGE UP (ref 0–0.5)
EOSINOPHIL NFR BLD AUTO: 4.2 % — SIGNIFICANT CHANGE UP (ref 0–6)
GLUCOSE SERPL-MCNC: 129 MG/DL — HIGH (ref 70–99)
HCT VFR BLD CALC: 47.1 % — HIGH (ref 34.5–45)
HGB BLD-MCNC: 15.5 G/DL — SIGNIFICANT CHANGE UP (ref 11.5–15.5)
IMM GRANULOCYTES NFR BLD AUTO: 0.4 % — SIGNIFICANT CHANGE UP (ref 0–1.5)
INR BLD: 0.99 RATIO — SIGNIFICANT CHANGE UP (ref 0.88–1.16)
LIDOCAIN IGE QN: 115 U/L — SIGNIFICANT CHANGE UP (ref 73–393)
LYMPHOCYTES # BLD AUTO: 1.4 K/UL — SIGNIFICANT CHANGE UP (ref 1–3.3)
LYMPHOCYTES # BLD AUTO: 19 % — SIGNIFICANT CHANGE UP (ref 13–44)
MCHC RBC-ENTMCNC: 30.2 PG — SIGNIFICANT CHANGE UP (ref 27–34)
MCHC RBC-ENTMCNC: 32.9 GM/DL — SIGNIFICANT CHANGE UP (ref 32–36)
MCV RBC AUTO: 91.6 FL — SIGNIFICANT CHANGE UP (ref 80–100)
MONOCYTES # BLD AUTO: 0.89 K/UL — SIGNIFICANT CHANGE UP (ref 0–0.9)
MONOCYTES NFR BLD AUTO: 12.1 % — SIGNIFICANT CHANGE UP (ref 2–14)
NEUTROPHILS # BLD AUTO: 4.68 K/UL — SIGNIFICANT CHANGE UP (ref 1.8–7.4)
NEUTROPHILS NFR BLD AUTO: 63.5 % — SIGNIFICANT CHANGE UP (ref 43–77)
PLATELET # BLD AUTO: 229 K/UL — SIGNIFICANT CHANGE UP (ref 150–400)
POTASSIUM SERPL-MCNC: 4.2 MMOL/L — SIGNIFICANT CHANGE UP (ref 3.5–5.3)
POTASSIUM SERPL-SCNC: 4.2 MMOL/L — SIGNIFICANT CHANGE UP (ref 3.5–5.3)
PROT SERPL-MCNC: 7.5 G/DL — SIGNIFICANT CHANGE UP (ref 6–8.3)
PROTHROM AB SERPL-ACNC: 10.8 SEC — SIGNIFICANT CHANGE UP (ref 9.8–12.7)
RBC # BLD: 5.14 M/UL — SIGNIFICANT CHANGE UP (ref 3.8–5.2)
RBC # FLD: 16.7 % — HIGH (ref 10.3–14.5)
SODIUM SERPL-SCNC: 133 MMOL/L — LOW (ref 135–145)
WBC # BLD: 7.37 K/UL — SIGNIFICANT CHANGE UP (ref 3.8–10.5)
WBC # FLD AUTO: 7.37 K/UL — SIGNIFICANT CHANGE UP (ref 3.8–10.5)

## 2018-06-22 PROCEDURE — 72110 X-RAY EXAM L-2 SPINE 4/>VWS: CPT

## 2018-06-22 PROCEDURE — 72110 X-RAY EXAM L-2 SPINE 4/>VWS: CPT | Mod: 26

## 2018-06-22 PROCEDURE — 93005 ELECTROCARDIOGRAM TRACING: CPT

## 2018-06-22 PROCEDURE — 80053 COMPREHEN METABOLIC PANEL: CPT

## 2018-06-22 PROCEDURE — 85730 THROMBOPLASTIN TIME PARTIAL: CPT

## 2018-06-22 PROCEDURE — 83690 ASSAY OF LIPASE: CPT

## 2018-06-22 PROCEDURE — 85610 PROTHROMBIN TIME: CPT

## 2018-06-22 PROCEDURE — 85027 COMPLETE CBC AUTOMATED: CPT

## 2018-06-22 PROCEDURE — 99284 EMERGENCY DEPT VISIT MOD MDM: CPT

## 2018-06-22 PROCEDURE — 99284 EMERGENCY DEPT VISIT MOD MDM: CPT | Mod: 25

## 2018-06-22 RX ORDER — SODIUM CHLORIDE 9 MG/ML
3 INJECTION INTRAMUSCULAR; INTRAVENOUS; SUBCUTANEOUS ONCE
Qty: 0 | Refills: 0 | Status: DISCONTINUED | OUTPATIENT
Start: 2018-06-22 | End: 2018-06-26

## 2018-06-22 NOTE — ED PROVIDER NOTE - OBJECTIVE STATEMENT
pt is a 68yo female with ESRD on dialysis (goes tuesday, thurs, sat), HTN, DM on insulin sent in by  dr. alvarez for abnormal labs. pt is unsure what the abnormal lab was, possibly calcium or potassium. pt reports she feels well at this time. of note, pt reports lower back pain s/p fall on her buttock yesterday, NO LOC. pt reports she fell on to a garden.

## 2018-06-22 NOTE — ED PROVIDER NOTE - PROGRESS NOTE DETAILS
labs reviewed. consulted dr alvarez who advised if pt ekg WNL,  pt can be d/c and advised to stop vit D and go to dialysis tomorrow. will review ekg. x ray reviewed. ekg reviewed. appears unchanged from 5/1/18. pt advised to stop vitamin D and go to dialysis tomorrow. All questions answered and concerns addressed. pt verbalized understanding and agreement with plan and dx. pt advised to follow up with PMD. pt advised to return to ed for worsening symptoms including fever, cp, sob. will dc. pt given copy of results. x ray reviewed. ekg reviewed. appears unchanged from 5/1/18. pt advised to stop vitamin D and go to dialysis tomorrow.  advised pt to follow up with heme/onc. All questions answered and concerns addressed. pt verbalized understanding and agreement with plan and dx. pt advised to follow up with PMD. pt advised to return to ed for worsening symptoms including fever, cp, sob. will dc. pt given copy of results.

## 2018-06-22 NOTE — ED PROVIDER NOTE - PHYSICAL EXAMINATION
Spine Exam:     Cervical: No erythema, ecchymosis, or visible deformity. No midline tenderness or step-off appreciated on palpation. No paravertebral tenderness. No muscle spasm. FROM. NEG NEXUS criteria.          Thoracic: No erythema, ecchymosis, or visible deformity. No midline tenderness or step-off appreciated on palpation. No paravertebral tenderness. No muscle spasm.           Lumbosacral: No erythema, ecchymosis, or visible deformity. + midline tenderness L4-L5, NO step-off appreciated on palpation. No paravertebral tenderness. No muscle spasm.  FROM BL LE AND BL UE. SENSATION GROSSLY INTACT X4.

## 2018-06-22 NOTE — ED PROVIDER NOTE - CARDIAC, MLM
Normal rate, regular rhythm.  Heart sounds S1, S2.  No murmurs, rubs or gallops. + port noted R upper chest.

## 2018-06-22 NOTE — ED PROVIDER NOTE - ATTENDING CONTRIBUTION TO CARE
Pt is a 68 yo female who presents to the ED with a cc of possible elevated calcium.  PMHx of CAD, depression, DM, h/o anxiety, h/o hep A, HTN, PAD, ESRD.  Pt is on dialysis every Tues/Thur/Sat.  Pt reports that she went to dialysis yesterday and routine outpatient blood work preformed.  Today her doctor Dr. Perez called and told her that her Calicum was elevated.  She believes that the reading was approx 15 but is unsure.  She was told to come to the ED for repeat blood work and possible admission.  Pt does state that yesterday while walking back into her house she suffered a mechanical fall, landed on her buttocks and then struck her head on the soil.  Denies LOC, and pt is not on blood thinners.  She ca Pt is a 68 yo female who presents to the ED with a cc of possible elevated calcium.  PMHx of CAD, depression, DM, h/o anxiety, h/o hep A, HTN, PAD, ESRD.  Pt is on dialysis every Tues/Thur/Sat.  Pt reports that she went to dialysis yesterday and routine outpatient blood work preformed.  Today her doctor Dr. Perez called and told her that her Calicum was elevated.  She believes that the reading was approx 15 but is unsure.  She was told to come to the ED for repeat blood work and possible admission.  Pt does state that yesterday while walking back into her house she suffered a mechanical fall, landed on her buttocks and then struck her head on the soil.  Denies LOC, and pt is not on blood thinners.  Pt denies HA, visual changes, N/V, CP, SOB, abd pain, ext numbness or weakness.  Denies neck pain.  Reports low back pain.  Denies loss of bowel or bladder function.  On exam pt sitting in bed NAD, PERRL EOMI, heart RRR with systolic murmur noted, lungs CTA, abd soft NT/ND.  No midline C/T/L TTP bilateral paraspinal lumbar TTP.  Sensation intact to bilateral UE and LE with intact pulses.  Perma cath noted to RU chest wall.  Will check cbc, cmp, lipse, INR, EKG.  Will consult Dr. Perez.  Agree with above plan of care

## 2018-06-22 NOTE — ED PROVIDER NOTE - CARE PLAN
Principal Discharge DX:	Hypercalcemia Principal Discharge DX:	Hypercalcemia  Secondary Diagnosis:	Back pain

## 2018-10-19 ENCOUNTER — EMERGENCY (EMERGENCY)
Facility: HOSPITAL | Age: 68
LOS: 1 days | Discharge: HOME | End: 2018-10-19
Attending: EMERGENCY MEDICINE
Payer: MEDICARE

## 2018-10-19 VITALS
WEIGHT: 138.01 LBS | HEIGHT: 69 IN | SYSTOLIC BLOOD PRESSURE: 176 MMHG | RESPIRATION RATE: 14 BRPM | TEMPERATURE: 99 F | DIASTOLIC BLOOD PRESSURE: 94 MMHG | OXYGEN SATURATION: 100 % | HEART RATE: 93 BPM

## 2018-10-19 PROCEDURE — 99282 EMERGENCY DEPT VISIT SF MDM: CPT

## 2018-10-19 NOTE — ED ADULT NURSE NOTE - NSIMPLEMENTINTERV_GEN_ALL_ED
Implemented All Universal Safety Interventions:  [place] to call system. Call bell, personal items and telephone within reach. Instruct patient to call for assistance. Room bathroom lighting operational. Non-slip footwear when patient is off stretcher. Physically safe environment: no spills, clutter or unnecessary equipment. Stretcher in lowest position, wheels locked, appropriate side rails in place.

## 2018-10-19 NOTE — ED ADULT NURSE NOTE - OBJECTIVE STATEMENT
Pt. sent in by PMD for r/o infected wound site to chest area post surgery. Pt. presents w/ redness and warm to touch to midline chest. Pt. states she does not want IV antibiotics and wants to go home. MD Dias notified.

## 2018-10-19 NOTE — ED PROVIDER NOTE - MEDICAL DECISION MAKING DETAILS
pt with wound dehiscence, pain, and discharge after surgery.  pt has been on IV antibiotics.  pt declines treatment.

## 2018-10-19 NOTE — ED PROVIDER NOTE - DIAGNOSIS COUNSELING, MDM
conducted a detailed discussion... I had a detailed discussion with the patient and/or guardian regarding the historical points, exam findings, and any diagnostic results supporting the discharge/admit diagnosis. I performed the initial face to face bedside interview with this patient regarding history of present illness, review of symptoms and past medical, social and family history.  I completed an independent physical examination.  I was the initial provider who evaluated this patient.  The history, review of symptoms and examination was documented by the scribe in my presence and I attest to the accuracy of the documentation.  I have signed out the follow up of any pending tests (i.e. labs, radiological studies) to the PA.  I have discussed the patient’s plan of care and disposition with the PA.

## 2018-10-19 NOTE — ED ADULT TRIAGE NOTE - CHIEF COMPLAINT QUOTE
patient came in ED with c/o non-healing chest incision site since Sept 12, 2018. s/p CABG X 1 (robotic)

## 2018-10-19 NOTE — ED PROVIDER NOTE - OBJECTIVE STATEMENT
66 y/o F with wound infection under left breast from recent robotic surgery.  Pt has received 3 doses of IV antibiotics via dialysis with no improvement of symptoms.  Pt states surgeon is in [place] at a conference and was not able to follow up with him.  Pt denies fevers, chills.  Pt states she just wants to know if it is infected but she does not want any antibiotics or admission at this time and will follow up with her surgeon.

## 2018-12-03 ENCOUNTER — EMERGENCY (EMERGENCY)
Facility: HOSPITAL | Age: 68
LOS: 1 days | Discharge: HOME | End: 2018-12-03
Attending: EMERGENCY MEDICINE | Admitting: EMERGENCY MEDICINE
Payer: MEDICARE

## 2018-12-03 VITALS
OXYGEN SATURATION: 95 % | RESPIRATION RATE: 16 BRPM | SYSTOLIC BLOOD PRESSURE: 173 MMHG | DIASTOLIC BLOOD PRESSURE: 90 MMHG | HEART RATE: 74 BPM

## 2018-12-03 VITALS
SYSTOLIC BLOOD PRESSURE: 178 MMHG | DIASTOLIC BLOOD PRESSURE: 89 MMHG | TEMPERATURE: 98 F | HEIGHT: 69 IN | HEART RATE: 93 BPM | OXYGEN SATURATION: 99 % | WEIGHT: 139.99 LBS | RESPIRATION RATE: 15 BRPM

## 2018-12-03 PROCEDURE — 99283 EMERGENCY DEPT VISIT LOW MDM: CPT

## 2018-12-03 RX ORDER — AMLODIPINE BESYLATE 2.5 MG/1
1 TABLET ORAL
Qty: 0 | Refills: 0 | COMMUNITY

## 2018-12-03 RX ORDER — DIAZEPAM 5 MG
1 TABLET ORAL
Qty: 15 | Refills: 0 | OUTPATIENT
Start: 2018-12-03 | End: 2018-12-07

## 2018-12-03 RX ORDER — ACETAMINOPHEN 500 MG
650 TABLET ORAL ONCE
Qty: 0 | Refills: 0 | Status: COMPLETED | OUTPATIENT
Start: 2018-12-03 | End: 2018-12-03

## 2018-12-03 RX ORDER — DIAZEPAM 5 MG
5 TABLET ORAL ONCE
Qty: 0 | Refills: 0 | Status: DISCONTINUED | OUTPATIENT
Start: 2018-12-03 | End: 2018-12-03

## 2018-12-03 RX ADMIN — Medication 650 MILLIGRAM(S): at 05:54

## 2018-12-03 RX ADMIN — Medication 5 MILLIGRAM(S): at 05:54

## 2018-12-03 NOTE — ED PROVIDER NOTE - PROGRESS NOTE DETAILS
pt reevaluted, feeling better, pain has improved. pt to be d/ chome follow up with pmd, stretches given to pt, valium prn and return if any symptoms worsen

## 2018-12-03 NOTE — ED ADULT NURSE REASSESSMENT NOTE - NS ED NURSE REASSESS COMMENT FT1
pain improved but still present, will call PMD Dr Reina for follow up. Ambulating without difficulty, breathing easily, due for dialysis tomorrow.

## 2018-12-03 NOTE — ED ADULT NURSE NOTE - PMH
CAD (coronary artery disease)    CRF (chronic renal failure), unspecified stage    Depression    Diabetes mellitus II    Dialysis patient    h/o Anxiety attack    h/o Hepatitis A 1969  currently resolved, no symptoms  h/o Myocardial infarct 2007    h/o Smoking  quitted 3/2012  HTN (hypertension)    Murmur, cardiac    PAD (peripheral artery disease)

## 2018-12-03 NOTE — ED PROVIDER NOTE - OBJECTIVE STATEMENT
68y female who presents with back pain for 2 days. pt denies trauma or fall, c/o diffuse left sided low back pain radiating to leg, no tingling or numbness, no weakness, pt took advi pm at 2am with some relief.

## 2018-12-03 NOTE — ED ADULT NURSE NOTE - NSIMPLEMENTINTERV_GEN_ALL_ED
Implemented All Universal Safety Interventions:  [place] to call system. Call bell, personal items and telephone within reach. Instruct patient to call for assistance. Room bathroom lighting operational. Non-slip footwear when patient is off stretcher. Physically safe environment: no spills, clutter or unnecessary equipment. Stretcher in lowest position, wheels locked, appropriate side rails in place.

## 2018-12-05 ENCOUNTER — EMERGENCY (EMERGENCY)
Facility: HOSPITAL | Age: 68
LOS: 1 days | End: 2018-12-05
Attending: EMERGENCY MEDICINE
Payer: MEDICARE

## 2018-12-05 VITALS
HEART RATE: 90 BPM | SYSTOLIC BLOOD PRESSURE: 186 MMHG | WEIGHT: 139.99 LBS | RESPIRATION RATE: 16 BRPM | OXYGEN SATURATION: 100 % | TEMPERATURE: 98 F | HEIGHT: 69 IN | DIASTOLIC BLOOD PRESSURE: 86 MMHG

## 2018-12-05 PROCEDURE — 99283 EMERGENCY DEPT VISIT LOW MDM: CPT

## 2018-12-05 PROCEDURE — 72100 X-RAY EXAM L-S SPINE 2/3 VWS: CPT | Mod: 26

## 2018-12-05 PROCEDURE — 72100 X-RAY EXAM L-S SPINE 2/3 VWS: CPT

## 2018-12-05 PROCEDURE — 73502 X-RAY EXAM HIP UNI 2-3 VIEWS: CPT

## 2018-12-05 PROCEDURE — 99284 EMERGENCY DEPT VISIT MOD MDM: CPT | Mod: 25

## 2018-12-05 PROCEDURE — 73502 X-RAY EXAM HIP UNI 2-3 VIEWS: CPT | Mod: 26,LT

## 2018-12-05 RX ADMIN — Medication 375 MILLIGRAM(S): at 17:05

## 2018-12-05 NOTE — ED ADULT NURSE NOTE - NSIMPLEMENTINTERV_GEN_ALL_ED
Implemented All Universal Safety Interventions:  [place] to call system. Call bell, personal items and telephone within reach. Instruct patient to call for assistance. Room bathroom lighting operational. Non-slip footwear when patient is off stretcher. Physically safe environment: no spills, clutter or unnecessary equipment. Stretcher in lowest position, wheels locked, appropriate side rails in place.

## 2018-12-05 NOTE — ED PROVIDER NOTE - ATTENDING CONTRIBUTION TO CARE
Pt is a 67 yo female who presents to the ED with a cc of left lower back pain with radiation down her left leg.  PMHx of CAD, ESRD on dialysis, depression, DM, anxiety, h/o hep A, CAD, HTN, PAD.  Pt reports that for the last several days she has been suffering from left lower back pain with radiation down her left leg.  Denies any acute trauma to her back, denies previous injury.  Pt was seen in the ED 2 days ago and was treated with mild relief.  She reports that she was sent home on po Valium but she is not having relief.  Pt reports continued left lower back pain, with radiation down her left leg.  Denies numbness in her left leg.  Denies loss of bowel or bladder function.  Pt denies any trauma or injury to the back.  Denies fever, chills, N/V, CP, SOB, abd pain.  On exam pt sitting in bed NAD, NCAT, PERRL, EOMI, heart RRR with systolic murmur noted, lungs CTA, abd soft NT/ND.  No midline C/T/L TTP no step offs or deformities noted.  TTP overlying left paraspinal and gluteal region with increased tone and spasm.  No skin changes noted.  Positive straight leg raise to left leg at 10 degrees.  Sensation grossly intact to left lower ext, cap refill less then 2 seconds, +pedal pulse.  Will obtain x-rays, medicate for pain.  Agree with above plan of care

## 2018-12-05 NOTE — ED PROVIDER NOTE - OBJECTIVE STATEMENT
68 y female presents with left lower back, buttock, left hip pain with from x 1 week, states was seen in ED 2 days ago, given rx for valium.  medication is not helping much.  patient is on HD hx of ESRD.  PMD Dr Perez  PMH  CAD (coronary artery disease)    CRF (chronic renal failure), unspecified stage    Depression    Diabetes mellitus II    Dialysis patient    h/o Anxiety attack    h/o Hepatitis A 1969  currently resolved, no symptoms  h/o Myocardial infarct 2007    h/o Smoking  quitted 3/2012  HTN (hypertension)    Murmur, cardiac    PAD (peripheral artery disease)

## 2018-12-05 NOTE — ED PROVIDER NOTE - PROGRESS NOTE DETAILS
patient resting comfortably , rx for percocet sent to pharmacy,  spoke with Dr Perez, ok naproxen, patient given 1 dose in ED.  patient is on asa and plavix.  given information for spine Dr Berumen, copy of results given and discussed, recommend otc lidoderm patch, as directed for pain.  patient has valium for pain

## 2018-12-05 NOTE — ED PROVIDER NOTE - MUSCULOSKELETAL MINIMAL EXAM
atraumatic/normal range of motion/left lower paraspinal lumbar spine pain with from, nvi, left hip pain with from, nvi

## 2018-12-05 NOTE — ED ADULT NURSE NOTE - OBJECTIVE STATEMENT
Patient c/o worsening back pain denies any trauma or falls. Patient reports he was seen @ this hospital for evaluation reports being on muscle relaxers with minimal relief.  Patient denies bowel/bladder incontinents or difficulty.

## 2018-12-07 NOTE — ASU PREOP CHECKLIST - WAS PATIENT ON BETA BLOCKER?
Reason For Visit  CIERA FORD is an established patient here today for an annual physical.   A chaperone is not applicable. He is unaccompanied.        Quality    Adult Wellness CI height documented, discussion of regular exercise, discussion of nutritional quality of diet, not using alcohol, not having considered quitting drinking, not getting angry when talked to about drinking, not having a drink or two in the morning to get going, not drinking alcohol regularly, and feeling guilty about it, colonoscopy not normal, no tobacco use, does not have feelings of hopelessness (PHQ-2), no Anhedonia (PHQ-2), not taking medication for depression, preventive medicine therapy for influenza, preventive medicine therapy for pneumococcal and pain scale level reviewed.   Diabetes CI height documented, recent medical exam by an Ophthalmologist: 11/2018 and a foot inspection performed.      History of Present Illness  Patient is here for annual physical.     Patient has had elevated blood pressure -- is following with Dr. Cheng    Back pain waxes and wanes -- patient states there is no rhyme or reason    Blood sugar has been doing well    Patient will get a flu shot today    Depression comes and goes    Occasional constipation    Just had eye exam                .      Review of Systems    Const: not feeling tired, no recent weight gain and no recent weight loss.   Eyes: no eye pain, no red eyes, no itching of the eyes, no blurred vision and no change in vision.   ENT: no earache, no discharge from the ears and no hearing loss.   CV: no chest pain, no palpitations, no lightheadedness and no dyspnea on exertion.   Resp: no cough, no snoring and not expressed as feeling short of breath.   GI: constipation, but no diarrhea.   : no change in urinary frequency, no feelings of urinary urgency and no urinary loss of control.   Heme/Lymph: no tendency for easy bleeding and no tendency for easy bruising.   Musc: joint pain and back  pain.   Neuro: no numbness and no feeling weak.   Psych: anxiety and depression.   Skin: no skin lesions, no rash and no change in a mole.       Allergies  No Known Drug Allergies    Current Meds   1. AmLODIPine Besylate 5 MG Oral Tablet; TAKE 1 TABLET BY MOUTH EVERY DAY;   Therapy: 83Ryq6350 to (Evaluate:03Rel5570); Last Rx:[id number] Ordered   2. BD Pen Needle Kirsten U/F 32G X 4 MM; USE ONCE DAILY AS DIRECTED;   Therapy: 24Oct2013 to (Evaluate:65Jhu0441)  Requested for: 02Qpv4148; Last   Rx:[id number] Ordered   3. Dorzolamide HCl - 2 % Ophthalmic Solution; INSTILL 1 DROP INTO BOTH EYES 2   TIMES DAILY;   Therapy: (Recorded:84Ssq0907) to Recorded   4. DULoxetine HCl - 30 MG Oral Capsule Delayed Release Particles; TAKE 1 CAPSULE BY   MOUTH EVERY DAY;   Therapy: 28Gkj7631 to (Evaluate:63Sqn4461)  Requested for: 97Hka4563; Last   Rx:[id number] Ordered   5. HydroCHLOROthiazide 25 MG Oral Tablet; TAKE 1 TABLET BY MOUTH ONE TIME DAILY;   Therapy: 19Nov2013 to (Evaluate:76Znt3193)  Requested for: 14Zxs6809; Last   Rx:[id number] Ordered   6. Lantus SoloStar 100 UNIT/ML Subcutaneous Solution Pen-injector; 57 units   subcutaneoulsy daily;   Therapy: 24Oct2013 to (Evaluate:42Zuc7101)  Requested for: 16Xll8066; Last   Rx:[id number] Ordered   7. Latanoprost 0.005 % Ophthalmic Solution; INSTILL 1 DROP  INTO AFFECTED EYE(S)   ONCE DAILY AS DIRECTED;   Therapy: 00Rdt0946 to Recorded   8. Lovastatin 20 MG Oral Tablet; TAKE 1 TABLET BY MOUTH ONCE DAILY;   Therapy: 05Mar2013 to (Evaluate:19Wdh3451)  Requested for: 48Kkj5295; Last   Rx:[id number] Ordered   9. ProAir  (90 Base) MCG/ACT Inhalation Aerosol Solution; INHALE 1-2 PUFFS   EVERY 4-6 HOURS AS NEEDED AND AS DIRECTED;   Therapy: 76Fqb4615 to (Last Rx:22Zqm4265) Ordered   10. Tradjenta 5 MG Oral Tablet; TAKE 1 TABLET BY MOUTH EVERY DAY;    Therapy: 25Jun2015 to (Evaluate:31Oct2019)  Requested for: 05Nov2018; Last    Rx:05Nov2018 Ordered   11. TRUEplus Pen Needles 32G X 4 MM;  INJECT ONCE DAILY;    Therapy: 15Dbz9059 to (Evaluate:2019)  Requested for: 99Jbn6376; Last    Rx:[id number] Ordered    Active Problems  Back pain (M54.9)  Benign essential HTN (I10)  Chronic kidney disease, stage 3 (N18.3)  Current use of insulin (Z79.4)  Depression (F32.9)  Diabetes mellitus (E11.9)  Hyperlipidemia (E78.5)  Male erectile disorder (N52.9)  Need for influenza vaccination (Z23)  Need for pneumococcal vaccination (Z23)  Obesity (E66.9)  Screening for colon cancer (Z12.11)    Past Medical History  History of Diabetes mellitus type 2, uncontrolled (E11.65)  History of acute bronchitis (Z87.09)  History of cough  History of upper respiratory infection (Z87.09)  History of Need for influenza vaccination (Z23)  History of Need for zoster vaccine (Z23)  History of No significant past medical history  History of Pre-operative examination (Z01.818)  History of Renal mass (N28.89)    Surgical History  Denied: History Of Prior Surgery    Family History  Family History   Family history of Medical history non-contributory    Social History  Never smoker  No alcohol use    Review  Past medical history, problem list, family medical history, surgical history and social history reviewed.      Vitals  Signs   Recorded: 2018 09:35AM   Height: 5 ft 10 in  Weight: 238 lb 6.88 oz  BMI Calculated: 34.21  BSA Calculated: 2.25  Systolic - Sittin, LUE  Diastolic - Sittin, LUE  Temperature: 97 F, Tympanic  Heart Rate: 66  Pulse Quality: Normal  Respiration: 17  O2 Saturation: 98, RA    Physical Exam  Constitutional: alert and current vital signs reviewed . Depressed affect.   Head and Face: atraumatic, no deformities, normocephalic, normal facies.   Eyes: no discharge, normal conjunctiva, no eyelid swelling and the sclerae were normal.   ENT: normal appearing outer ear, normal appearing nose. examination of the tympanic membrane showed normal landmarks, normal appearing external canal. nasal mucosa moist  and pink, no nasal discharge. normal lips. oral mucosa pink and moist, no oral lesions.   Neck: normal appearing neck, supple neck and no mass was seen.   Lymphatic: no lymphadenopathy.   Chest: normal chest appearance.   Pulmonary: no respiratory distress, normal respiratory rate and effort and no accessory muscle use. breath sounds clear to auscultation bilaterally.   Cardiovascular: normal rate, no murmurs were heard, regular rhythm, normal S1 and normal S2. edema was not present in the lower extremities.   Abdomen: soft, nontender, nondistended, normal bowel sounds and no abdominal mass. no hepatomegaly and no splenomegaly.   Musculoskeletal: normal gait.   Neurologic: cranial nerves grossly intact.   Psychiatric: oriented to person, oriented to place and oriented to time.   Skin, Hair, Nails: acne, but normal skin color and pigmentation and no rash.      Immunizations  Influenza --- Series1: 29-Sep-2014; Series2: 19-Sep-2017   PCV --- Series1: 19-Sep-2017   Zoster --- Series1: 09-Sep-2013     Assessment  Diabetes mellitus (E11.9)  Hyperlipidemia (E78.5)  Back pain (M54.9)  Benign essential HTN (I10)  Chronic kidney disease, stage 3 (N18.3)  Current use of insulin (Z79.4)  Depression (F32.9)  Need for influenza vaccination (Z23)  Obesity (E66.9)  Screening for colon cancer (Z12.11)    Plan  CBC WITH AUTOMATED DIFFERENTIAL; Status:Active; Requested for:20Nov2018;   COMP METABOLIC PNL; Status:Active; Requested for:20Nov2018;   Fecal Occult Blood, Tube Test; Status:Active; Requested for:20Nov2018;   HEMOGLOBIN A1C GLYCOSYLATED; Status:Active; Requested for:20Nov2018;   LIPID PNL W/ RFX; Status:Active; Requested for:20Nov2018;   MICROALBUMIN, URINE; Status:Active; Requested for:20Nov2018;   URINALYSIS, COMPLETE INCLUDING MICROSCOPIC AND URINE CULTURE REFLEX;  Status:Active; Requested for:20Nov2018;   TYPE : CLEAN CATCH/MIDSTREAM  Flulaval Quadrivalent Intramuscular Suspension; Inject as per protocol; To  Be Done:  26Lqp5618  Plans:     Plan:   Patient to have labs done -- patient will be notified of results     Patient to call with any problems or concerns.      Discussion/Summary  Patient is here for annual physical.    Patient has been having some elevated blood pressure readings and seeing Dr. Cheng next week.    Patient's back pain waxes and wanes states there is no rhyme or reason to it and does not want to see anyone about it.    Patient states his blood sugars been doing well and is normally in the low 100s on fasting samples.    Patient will get flu shot today.    Patient continues to complain about depression and does not feel it can be anything done.    I told patient that he might want to still consider therapy.    Patient has occasional constipation so we discussed increasing his fiber.    Patient will have labs done we will not do them today as he has eaten.    Patient will have further plans made based on his labs. Patient to continue on present medications.      Signatures   Electronically signed by : Carolyn Ballard CMA; Nov 20 2018  9:37AM CST    Electronically signed by : KARAN JUAREZ M.D.; Nov 20 2018 10:36AM CST (Author)     Yes

## 2018-12-27 ENCOUNTER — EMERGENCY (EMERGENCY)
Facility: HOSPITAL | Age: 68
LOS: 1 days | Discharge: HOME | End: 2018-12-27
Attending: EMERGENCY MEDICINE | Admitting: EMERGENCY MEDICINE
Payer: MEDICARE

## 2018-12-27 VITALS
HEART RATE: 95 BPM | RESPIRATION RATE: 18 BRPM | TEMPERATURE: 98 F | DIASTOLIC BLOOD PRESSURE: 90 MMHG | OXYGEN SATURATION: 100 % | WEIGHT: 145.51 LBS | SYSTOLIC BLOOD PRESSURE: 186 MMHG

## 2018-12-27 PROCEDURE — 99283 EMERGENCY DEPT VISIT LOW MDM: CPT

## 2018-12-27 PROCEDURE — 99282 EMERGENCY DEPT VISIT SF MDM: CPT

## 2018-12-27 NOTE — ED ADULT TRIAGE NOTE - CHIEF COMPLAINT QUOTE
Pt reports back pain which radiates to left calf for 3 weeks, thinks it might be sciatica. Denies any other symptoms

## 2018-12-27 NOTE — ED PROVIDER NOTE - CHPI ED SYMPTOMS NEG
no bladder dysfunction/no bowel dysfunction/no difficulty bearing weight/no motor function loss/no numbness

## 2018-12-27 NOTE — ED PROVIDER NOTE - OBJECTIVE STATEMENT
pt seen in er 3 weeks ago for sciatica returning because unsure who to followup with. pt reports left upper buttock pain radiating down left leg, worse with changes in position. no trauma, fevers, chills, weakness, numbness, abd pain, incontinence or any other complaints. pt declining pain meds.  ortho - none

## 2018-12-27 NOTE — ED ADULT NURSE NOTE - OBJECTIVE STATEMENT
Present to ER with c/o of generalized back pain. Pt came to ER because she does not know where to go and wants MRI.

## 2019-01-23 ENCOUNTER — EMERGENCY (EMERGENCY)
Facility: HOSPITAL | Age: 69
LOS: 1 days | Discharge: HOME | End: 2019-01-23
Attending: STUDENT IN AN ORGANIZED HEALTH CARE EDUCATION/TRAINING PROGRAM | Admitting: STUDENT IN AN ORGANIZED HEALTH CARE EDUCATION/TRAINING PROGRAM
Payer: MEDICARE

## 2019-01-23 VITALS
RESPIRATION RATE: 18 BRPM | HEART RATE: 92 BPM | SYSTOLIC BLOOD PRESSURE: 129 MMHG | DIASTOLIC BLOOD PRESSURE: 82 MMHG | OXYGEN SATURATION: 98 % | TEMPERATURE: 98 F

## 2019-01-23 VITALS
DIASTOLIC BLOOD PRESSURE: 83 MMHG | OXYGEN SATURATION: 96 % | HEART RATE: 95 BPM | TEMPERATURE: 98 F | SYSTOLIC BLOOD PRESSURE: 181 MMHG | RESPIRATION RATE: 18 BRPM

## 2019-01-23 PROCEDURE — 99284 EMERGENCY DEPT VISIT MOD MDM: CPT

## 2019-01-23 PROCEDURE — 99283 EMERGENCY DEPT VISIT LOW MDM: CPT | Mod: 25

## 2019-01-23 PROCEDURE — 72040 X-RAY EXAM NECK SPINE 2-3 VW: CPT | Mod: 26

## 2019-01-23 PROCEDURE — 72040 X-RAY EXAM NECK SPINE 2-3 VW: CPT

## 2019-01-23 RX ORDER — DIAZEPAM 5 MG
5 TABLET ORAL ONCE
Qty: 0 | Refills: 0 | Status: DISCONTINUED | OUTPATIENT
Start: 2019-01-23 | End: 2019-01-23

## 2019-01-23 RX ORDER — CYCLOBENZAPRINE HYDROCHLORIDE 10 MG/1
1 TABLET, FILM COATED ORAL
Qty: 6 | Refills: 0
Start: 2019-01-23 | End: 2019-01-24

## 2019-01-23 RX ORDER — DIAZEPAM 5 MG
1 TABLET ORAL
Qty: 6 | Refills: 0
Start: 2019-01-23 | End: 2019-01-24

## 2019-01-23 RX ORDER — CYCLOBENZAPRINE HYDROCHLORIDE 10 MG/1
10 TABLET, FILM COATED ORAL ONCE
Qty: 0 | Refills: 0 | Status: COMPLETED | OUTPATIENT
Start: 2019-01-23 | End: 2019-01-23

## 2019-01-23 RX ADMIN — Medication 5 MILLIGRAM(S): at 03:59

## 2019-01-23 RX ADMIN — CYCLOBENZAPRINE HYDROCHLORIDE 10 MILLIGRAM(S): 10 TABLET, FILM COATED ORAL at 03:59

## 2019-01-23 NOTE — ED PROVIDER NOTE - PROGRESS NOTE DETAILS
Patient feeling better, informed of results of x-ray, advised to f/u with PMD. Friend at bedside to take patient home

## 2019-01-23 NOTE — ED ADULT NURSE NOTE - OBJECTIVE STATEMENT
PAtient complaining of left side of neck pain started yesterday seen and evaluated by MD with orders made and carried out.

## 2019-01-23 NOTE — ED PROVIDER NOTE - PHYSICAL EXAMINATION
Head: NC/AT  Neck: full ROM of c-spine, no midline step-off, deformity, crepitus.  Slightly decreased ROM of looking left Head: NC/AT  Neck: full ROM of c-spine, no midline step-off, deformity, crepitus.  Slightly decreased ROM of looking left. 5/5 UE strength

## 2019-01-23 NOTE — ED ADULT NURSE NOTE - NSIMPLEMENTINTERV_GEN_ALL_ED
Implemented All Universal Safety Interventions:  [place] to call system. Call bell, personal items and telephone within reach. Instruct patient to call for assistance. Room bathroom lighting operational. Non-slip footwear when patient is off stretcher. Physically safe environment: no spills, clutter or unnecessary equipment. Stretcher in lowest position, wheels locked, appropriate side rails in place.

## 2019-01-23 NOTE — ED PROVIDER NOTE - MEDICAL DECISION MAKING DETAILS
68 year old female presents with left neck spasm x 2 days.  No trauma.  X-ray, analgesia, muscle relaxer, reassess

## 2019-01-23 NOTE — ED PROVIDER NOTE - OBJECTIVE STATEMENT
68 year old female with extensive PMH including CRF on HD (Tue/Thurs/Sat), PAD, HTN, IDDM presents with left-sided neck pain since yesterday afternoon.  She denies trauma or falls.  She states the pain is intermittent and presents as a spasm. She has had this pain before. Taking Tylenol without relief.  It is worse with movement. Does not radiate to her arms or back.  No headache, blurry vision, no numbness to her UE, no other neuro complaints. PMD Dr. Pallavan

## 2019-05-09 ENCOUNTER — EMERGENCY (EMERGENCY)
Facility: HOSPITAL | Age: 69
LOS: 1 days | Discharge: LEFT BEFORE DISCHARGE | End: 2019-05-09
Attending: EMERGENCY MEDICINE | Admitting: EMERGENCY MEDICINE
Payer: MEDICARE

## 2019-05-09 VITALS
SYSTOLIC BLOOD PRESSURE: 150 MMHG | DIASTOLIC BLOOD PRESSURE: 75 MMHG | RESPIRATION RATE: 15 BRPM | OXYGEN SATURATION: 98 % | TEMPERATURE: 98 F | HEART RATE: 80 BPM

## 2019-05-09 VITALS
OXYGEN SATURATION: 100 % | SYSTOLIC BLOOD PRESSURE: 168 MMHG | DIASTOLIC BLOOD PRESSURE: 78 MMHG | HEART RATE: 85 BPM | TEMPERATURE: 99 F | RESPIRATION RATE: 18 BRPM

## 2019-05-09 LAB
ALBUMIN SERPL ELPH-MCNC: 4 G/DL — SIGNIFICANT CHANGE UP (ref 3.3–5)
ALP SERPL-CCNC: 124 U/L — HIGH (ref 40–120)
ALT FLD-CCNC: 22 U/L — SIGNIFICANT CHANGE UP (ref 12–78)
ANION GAP SERPL CALC-SCNC: 8 MMOL/L — SIGNIFICANT CHANGE UP (ref 5–17)
APPEARANCE UR: CLEAR — SIGNIFICANT CHANGE UP
AST SERPL-CCNC: 17 U/L — SIGNIFICANT CHANGE UP (ref 15–37)
BACTERIA # UR AUTO: ABNORMAL
BASOPHILS # BLD AUTO: 0.07 K/UL — SIGNIFICANT CHANGE UP (ref 0–0.2)
BASOPHILS NFR BLD AUTO: 0.8 % — SIGNIFICANT CHANGE UP (ref 0–2)
BILIRUB SERPL-MCNC: 0.4 MG/DL — SIGNIFICANT CHANGE UP (ref 0.2–1.2)
BILIRUB UR-MCNC: NEGATIVE — SIGNIFICANT CHANGE UP
BUN SERPL-MCNC: 27 MG/DL — HIGH (ref 7–23)
CALCIUM SERPL-MCNC: 9.3 MG/DL — SIGNIFICANT CHANGE UP (ref 8.5–10.1)
CHLORIDE SERPL-SCNC: 97 MMOL/L — SIGNIFICANT CHANGE UP (ref 96–108)
CO2 SERPL-SCNC: 30 MMOL/L — SIGNIFICANT CHANGE UP (ref 22–31)
COLOR SPEC: YELLOW — SIGNIFICANT CHANGE UP
COMMENT - URINE: SIGNIFICANT CHANGE UP
CREAT SERPL-MCNC: 4.4 MG/DL — HIGH (ref 0.5–1.3)
DIFF PNL FLD: ABNORMAL
EOSINOPHIL # BLD AUTO: 0.24 K/UL — SIGNIFICANT CHANGE UP (ref 0–0.5)
EOSINOPHIL NFR BLD AUTO: 2.7 % — SIGNIFICANT CHANGE UP (ref 0–6)
EPI CELLS # UR: SIGNIFICANT CHANGE UP
GLUCOSE SERPL-MCNC: 143 MG/DL — HIGH (ref 70–99)
GLUCOSE UR QL: 1000 MG/DL
HCT VFR BLD CALC: 44.3 % — SIGNIFICANT CHANGE UP (ref 34.5–45)
HGB BLD-MCNC: 14.3 G/DL — SIGNIFICANT CHANGE UP (ref 11.5–15.5)
IMM GRANULOCYTES NFR BLD AUTO: 0.3 % — SIGNIFICANT CHANGE UP (ref 0–1.5)
KETONES UR-MCNC: NEGATIVE — SIGNIFICANT CHANGE UP
LEUKOCYTE ESTERASE UR-ACNC: ABNORMAL
LYMPHOCYTES # BLD AUTO: 1.66 K/UL — SIGNIFICANT CHANGE UP (ref 1–3.3)
LYMPHOCYTES # BLD AUTO: 18.9 % — SIGNIFICANT CHANGE UP (ref 13–44)
MCHC RBC-ENTMCNC: 30.9 PG — SIGNIFICANT CHANGE UP (ref 27–34)
MCHC RBC-ENTMCNC: 32.3 GM/DL — SIGNIFICANT CHANGE UP (ref 32–36)
MCV RBC AUTO: 95.7 FL — SIGNIFICANT CHANGE UP (ref 80–100)
MONOCYTES # BLD AUTO: 0.96 K/UL — HIGH (ref 0–0.9)
MONOCYTES NFR BLD AUTO: 11 % — SIGNIFICANT CHANGE UP (ref 2–14)
NEUTROPHILS # BLD AUTO: 5.8 K/UL — SIGNIFICANT CHANGE UP (ref 1.8–7.4)
NEUTROPHILS NFR BLD AUTO: 66.3 % — SIGNIFICANT CHANGE UP (ref 43–77)
NITRITE UR-MCNC: NEGATIVE — SIGNIFICANT CHANGE UP
NRBC # BLD: 0 /100 WBCS — SIGNIFICANT CHANGE UP (ref 0–0)
PH UR: 8 — SIGNIFICANT CHANGE UP (ref 5–8)
PLATELET # BLD AUTO: 217 K/UL — SIGNIFICANT CHANGE UP (ref 150–400)
POTASSIUM SERPL-MCNC: 4.1 MMOL/L — SIGNIFICANT CHANGE UP (ref 3.5–5.3)
POTASSIUM SERPL-SCNC: 4.1 MMOL/L — SIGNIFICANT CHANGE UP (ref 3.5–5.3)
PROT SERPL-MCNC: 8.1 G/DL — SIGNIFICANT CHANGE UP (ref 6–8.3)
PROT UR-MCNC: 500 MG/DL
RBC # BLD: 4.63 M/UL — SIGNIFICANT CHANGE UP (ref 3.8–5.2)
RBC # FLD: 15.5 % — HIGH (ref 10.3–14.5)
RBC CASTS # UR COMP ASSIST: SIGNIFICANT CHANGE UP /HPF (ref 0–4)
SODIUM SERPL-SCNC: 135 MMOL/L — SIGNIFICANT CHANGE UP (ref 135–145)
SP GR SPEC: 1.01 — SIGNIFICANT CHANGE UP (ref 1.01–1.02)
UROBILINOGEN FLD QL: NEGATIVE — SIGNIFICANT CHANGE UP
WBC # BLD: 8.76 K/UL — SIGNIFICANT CHANGE UP (ref 3.8–10.5)
WBC # FLD AUTO: 8.76 K/UL — SIGNIFICANT CHANGE UP (ref 3.8–10.5)
WBC UR QL: ABNORMAL

## 2019-05-09 PROCEDURE — 80053 COMPREHEN METABOLIC PANEL: CPT

## 2019-05-09 PROCEDURE — 99283 EMERGENCY DEPT VISIT LOW MDM: CPT | Mod: 25

## 2019-05-09 PROCEDURE — 81001 URINALYSIS AUTO W/SCOPE: CPT

## 2019-05-09 PROCEDURE — 71046 X-RAY EXAM CHEST 2 VIEWS: CPT

## 2019-05-09 PROCEDURE — 36415 COLL VENOUS BLD VENIPUNCTURE: CPT

## 2019-05-09 PROCEDURE — 71046 X-RAY EXAM CHEST 2 VIEWS: CPT | Mod: 26

## 2019-05-09 PROCEDURE — 99283 EMERGENCY DEPT VISIT LOW MDM: CPT

## 2019-05-09 PROCEDURE — 85027 COMPLETE CBC AUTOMATED: CPT

## 2019-05-09 RX ORDER — ACETAMINOPHEN 500 MG
650 TABLET ORAL ONCE
Refills: 0 | Status: COMPLETED | OUTPATIENT
Start: 2019-05-09 | End: 2019-05-09

## 2019-05-09 RX ADMIN — Medication 650 MILLIGRAM(S): at 19:31

## 2019-05-09 NOTE — ED PROVIDER NOTE - OBJECTIVE STATEMENT
69 yo F PMHx ESRD on HD (T,Th,Sat), HTN, HLD presents to ED c/o left 69 yo F PMHx renal failure on HD (T,Th,Sat), HTN, HLD presents to ED c/o left upper back pain x 1 day. Pt describes pain as a dull aching pain, worse with movement/deep breaths, resolves at rest. PT denies chest pain, SOB, fever/chills, dysuria, hematuria, abdominal pain.

## 2019-05-09 NOTE — ED PROVIDER NOTE - NSFOLLOWUPINSTRUCTIONS_ED_ALL_ED_FT
You are signing out against medical advice. You have verbalized understanding of this action. Our plan was to perform a CT Scan to rule out pulmonary embolism. By refusing this exam, you risk right sided heart failure, pulmonary hypertension, coma/disability and death.   Return to the ED immediately for new or worsening symptoms  F/u with your primary doctor within the next 1-2 days.

## 2019-05-09 NOTE — ED ADULT NURSE NOTE - OBJECTIVE STATEMENT
pt with complaints of left sided mid back pain since yesterday, " I woke up with it"  pt denies any other issues or complaints at this time.  pt states she had dialysis today. pt with complaints of left sided mid back pain since yesterday, " I woke up with it"  pt denies any other issues or complaints at this time.  pt states she had dialysis today. states it hurts when she takes a deep breath.

## 2019-05-09 NOTE — ED ADULT TRIAGE NOTE - CCCP TRG CHIEF CMPLNT
Had phone call with patient, had previously discussed with therapist Katiuska, patient has had a lot of therapy and has been very active in her care patient currently is also doing Pilates she is still not better she finds she still has a significant amount of neck and scapular pain    Previous clinical examination demonstrated significant trigger points with taut bands    At this point I think what is very reasonable at looking at a trial of trigger point injections to help integrate along with her continuing her current exercise program    I would like to reevaluate patient in the office to see if this is a reasonable plan patient will make an appointment. In the office for strong consideration for trigger point injections    RN will check with insurance to see if prior authorization can be obtained prior to visit    Did discuss that further therapy could be an option but she is done a lot of therapy already and has not made a lot of progress if she wished to proceed with therapy once every other week that is reasonable but she is done a lot of therapy already   back pain general

## 2019-05-09 NOTE — ED PROVIDER NOTE - ATTENDING CONTRIBUTION TO CARE
Pt seen and examined and d/w PA.  agree with a and p.  pt is a 69 yo female hx esrd on hd. pt with sudden  onset of left mid axillary chest pain, pleuritic and some pain on movement. pt last hd today.  pt on exam with no reproducible pain, cxr and labs ok. no cce, 2+ pulses, pt recommended for cta to ro pe and risks and benefits discussed but pt prefers to sign out ama, with likely chest wall pain.  d/c to fu pmd, tylenol of motrin for pain

## 2019-05-09 NOTE — ED PROVIDER NOTE - CHPI ED SYMPTOMS NEG
no constipation/no fatigue/no motor function loss/no bowel dysfunction/no difficulty bearing weight/no bladder dysfunction/no neck tenderness/no numbness/no tingling

## 2019-05-10 RX ORDER — ACETAMINOPHEN 500 MG
2 TABLET ORAL
Qty: 30 | Refills: 0
Start: 2019-05-10

## 2019-12-11 ENCOUNTER — INPATIENT (INPATIENT)
Facility: HOSPITAL | Age: 69
LOS: 1 days | Discharge: ANOTHER HOSPITAL | DRG: 280 | End: 2019-12-13
Attending: INTERNAL MEDICINE | Admitting: INTERNAL MEDICINE
Payer: MEDICARE

## 2019-12-11 VITALS
OXYGEN SATURATION: 92 % | SYSTOLIC BLOOD PRESSURE: 155 MMHG | HEIGHT: 69 IN | DIASTOLIC BLOOD PRESSURE: 80 MMHG | WEIGHT: 156.53 LBS | RESPIRATION RATE: 16 BRPM | TEMPERATURE: 96 F | HEART RATE: 107 BPM

## 2019-12-11 PROCEDURE — 93010 ELECTROCARDIOGRAM REPORT: CPT

## 2019-12-11 RX ORDER — ASPIRIN/CALCIUM CARB/MAGNESIUM 324 MG
325 TABLET ORAL ONCE
Refills: 0 | Status: COMPLETED | OUTPATIENT
Start: 2019-12-11 | End: 2019-12-12

## 2019-12-11 RX ORDER — IPRATROPIUM/ALBUTEROL SULFATE 18-103MCG
3 AEROSOL WITH ADAPTER (GRAM) INHALATION ONCE
Refills: 0 | Status: COMPLETED | OUTPATIENT
Start: 2019-12-11 | End: 2019-12-12

## 2019-12-11 NOTE — ED PROVIDER NOTE - OBJECTIVE STATEMENT
70yo femael bib ems with chest pain tonite. pt states it started at 6pm with tightness and sob, diffuse, non radiating, no cough, fever, chills, no diaphoresis

## 2019-12-11 NOTE — ED PROVIDER NOTE - CARE PLAN
Principal Discharge DX:	ESRD (end stage renal disease) on dialysis  Secondary Diagnosis:	Dyspnea  Secondary Diagnosis:	Chest pain at rest Principal Discharge DX:	ESRD (end stage renal disease) on dialysis  Secondary Diagnosis:	Dyspnea  Secondary Diagnosis:	Chest pain at rest  Secondary Diagnosis:	ACS (acute coronary syndrome)

## 2019-12-12 DIAGNOSIS — F32.9 MAJOR DEPRESSIVE DISORDER, SINGLE EPISODE, UNSPECIFIED: ICD-10-CM

## 2019-12-12 DIAGNOSIS — N18.6 END STAGE RENAL DISEASE: ICD-10-CM

## 2019-12-12 DIAGNOSIS — I10 ESSENTIAL (PRIMARY) HYPERTENSION: ICD-10-CM

## 2019-12-12 DIAGNOSIS — R06.00 DYSPNEA, UNSPECIFIED: ICD-10-CM

## 2019-12-12 DIAGNOSIS — I24.9 ACUTE ISCHEMIC HEART DISEASE, UNSPECIFIED: ICD-10-CM

## 2019-12-12 DIAGNOSIS — J81.1 CHRONIC PULMONARY EDEMA: ICD-10-CM

## 2019-12-12 DIAGNOSIS — Z29.9 ENCOUNTER FOR PROPHYLACTIC MEASURES, UNSPECIFIED: ICD-10-CM

## 2019-12-12 DIAGNOSIS — E11.9 TYPE 2 DIABETES MELLITUS WITHOUT COMPLICATIONS: ICD-10-CM

## 2019-12-12 LAB
ALBUMIN SERPL ELPH-MCNC: 3.8 G/DL — SIGNIFICANT CHANGE UP (ref 3.3–5)
ALP SERPL-CCNC: 102 U/L — SIGNIFICANT CHANGE UP (ref 40–120)
ALT FLD-CCNC: 21 U/L — SIGNIFICANT CHANGE UP (ref 12–78)
ANION GAP SERPL CALC-SCNC: 10 MMOL/L — SIGNIFICANT CHANGE UP (ref 5–17)
ANION GAP SERPL CALC-SCNC: 12 MMOL/L — SIGNIFICANT CHANGE UP (ref 5–17)
APTT BLD: 23.7 SEC — LOW (ref 28.5–37)
APTT BLD: 57.9 SEC — HIGH (ref 28.5–37)
APTT BLD: 71.3 SEC — HIGH (ref 28.5–37)
AST SERPL-CCNC: 18 U/L — SIGNIFICANT CHANGE UP (ref 15–37)
BASOPHILS # BLD AUTO: 0.08 K/UL — SIGNIFICANT CHANGE UP (ref 0–0.2)
BASOPHILS NFR BLD AUTO: 0.5 % — SIGNIFICANT CHANGE UP (ref 0–2)
BILIRUB SERPL-MCNC: 0.4 MG/DL — SIGNIFICANT CHANGE UP (ref 0.2–1.2)
BUN SERPL-MCNC: 33 MG/DL — HIGH (ref 7–23)
BUN SERPL-MCNC: 50 MG/DL — HIGH (ref 7–23)
CALCIUM SERPL-MCNC: 8.9 MG/DL — SIGNIFICANT CHANGE UP (ref 8.5–10.1)
CALCIUM SERPL-MCNC: 9.1 MG/DL — SIGNIFICANT CHANGE UP (ref 8.5–10.1)
CHLORIDE SERPL-SCNC: 97 MMOL/L — SIGNIFICANT CHANGE UP (ref 96–108)
CHLORIDE SERPL-SCNC: 97 MMOL/L — SIGNIFICANT CHANGE UP (ref 96–108)
CK MB BLD-MCNC: 7 % — HIGH (ref 0–3.5)
CK MB CFR SERPL CALC: 19.7 NG/ML — HIGH (ref 0–3.6)
CK SERPL-CCNC: 283 U/L — HIGH (ref 26–192)
CK SERPL-CCNC: 73 U/L — SIGNIFICANT CHANGE UP (ref 26–192)
CO2 SERPL-SCNC: 26 MMOL/L — SIGNIFICANT CHANGE UP (ref 22–31)
CO2 SERPL-SCNC: 28 MMOL/L — SIGNIFICANT CHANGE UP (ref 22–31)
CREAT SERPL-MCNC: 4.1 MG/DL — HIGH (ref 0.5–1.3)
CREAT SERPL-MCNC: 5.9 MG/DL — HIGH (ref 0.5–1.3)
D DIMER BLD IA.RAPID-MCNC: 274 NG/ML DDU — HIGH
EOSINOPHIL # BLD AUTO: 0.2 K/UL — SIGNIFICANT CHANGE UP (ref 0–0.5)
EOSINOPHIL NFR BLD AUTO: 1.2 % — SIGNIFICANT CHANGE UP (ref 0–6)
FLU A RESULT: SIGNIFICANT CHANGE UP
FLU A RESULT: SIGNIFICANT CHANGE UP
FLUAV AG NPH QL: SIGNIFICANT CHANGE UP
FLUBV AG NPH QL: SIGNIFICANT CHANGE UP
GLUCOSE SERPL-MCNC: 252 MG/DL — HIGH (ref 70–99)
GLUCOSE SERPL-MCNC: 425 MG/DL — HIGH (ref 70–99)
HCT VFR BLD CALC: 30.2 % — LOW (ref 34.5–45)
HCT VFR BLD CALC: 32.2 % — LOW (ref 34.5–45)
HGB BLD-MCNC: 10.2 G/DL — LOW (ref 11.5–15.5)
HGB BLD-MCNC: 10.5 G/DL — LOW (ref 11.5–15.5)
IMM GRANULOCYTES NFR BLD AUTO: 1.1 % — SIGNIFICANT CHANGE UP (ref 0–1.5)
INR BLD: 0.97 RATIO — SIGNIFICANT CHANGE UP (ref 0.88–1.16)
LYMPHOCYTES # BLD AUTO: 1.22 K/UL — SIGNIFICANT CHANGE UP (ref 1–3.3)
LYMPHOCYTES # BLD AUTO: 7.2 % — LOW (ref 13–44)
MCHC RBC-ENTMCNC: 31.3 PG — SIGNIFICANT CHANGE UP (ref 27–34)
MCHC RBC-ENTMCNC: 31.4 PG — SIGNIFICANT CHANGE UP (ref 27–34)
MCHC RBC-ENTMCNC: 32.6 GM/DL — SIGNIFICANT CHANGE UP (ref 32–36)
MCHC RBC-ENTMCNC: 33.8 GM/DL — SIGNIFICANT CHANGE UP (ref 32–36)
MCV RBC AUTO: 92.9 FL — SIGNIFICANT CHANGE UP (ref 80–100)
MCV RBC AUTO: 95.8 FL — SIGNIFICANT CHANGE UP (ref 80–100)
MONOCYTES # BLD AUTO: 1.03 K/UL — HIGH (ref 0–0.9)
MONOCYTES NFR BLD AUTO: 6.1 % — SIGNIFICANT CHANGE UP (ref 2–14)
NEUTROPHILS # BLD AUTO: 14.14 K/UL — HIGH (ref 1.8–7.4)
NEUTROPHILS NFR BLD AUTO: 83.9 % — HIGH (ref 43–77)
NRBC # BLD: 0 /100 WBCS — SIGNIFICANT CHANGE UP (ref 0–0)
NRBC # BLD: 0 /100 WBCS — SIGNIFICANT CHANGE UP (ref 0–0)
PLATELET # BLD AUTO: 254 K/UL — SIGNIFICANT CHANGE UP (ref 150–400)
PLATELET # BLD AUTO: 283 K/UL — SIGNIFICANT CHANGE UP (ref 150–400)
POTASSIUM SERPL-MCNC: 4 MMOL/L — SIGNIFICANT CHANGE UP (ref 3.5–5.3)
POTASSIUM SERPL-MCNC: 4.5 MMOL/L — SIGNIFICANT CHANGE UP (ref 3.5–5.3)
POTASSIUM SERPL-SCNC: 4 MMOL/L — SIGNIFICANT CHANGE UP (ref 3.5–5.3)
POTASSIUM SERPL-SCNC: 4.5 MMOL/L — SIGNIFICANT CHANGE UP (ref 3.5–5.3)
PROT SERPL-MCNC: 7.5 G/DL — SIGNIFICANT CHANGE UP (ref 6–8.3)
PROTHROM AB SERPL-ACNC: 10.9 SEC — SIGNIFICANT CHANGE UP (ref 10–12.9)
RBC # BLD: 3.25 M/UL — LOW (ref 3.8–5.2)
RBC # BLD: 3.36 M/UL — LOW (ref 3.8–5.2)
RBC # FLD: 15.3 % — HIGH (ref 10.3–14.5)
RBC # FLD: 15.4 % — HIGH (ref 10.3–14.5)
RSV RESULT: SIGNIFICANT CHANGE UP
RSV RNA RESP QL NAA+PROBE: SIGNIFICANT CHANGE UP
SODIUM SERPL-SCNC: 135 MMOL/L — SIGNIFICANT CHANGE UP (ref 135–145)
SODIUM SERPL-SCNC: 135 MMOL/L — SIGNIFICANT CHANGE UP (ref 135–145)
TROPONIN I SERPL-MCNC: 0.13 NG/ML — HIGH (ref 0.01–0.04)
TROPONIN I SERPL-MCNC: 12.8 NG/ML — HIGH (ref 0.01–0.04)
TROPONIN I SERPL-MCNC: 15.6 NG/ML — HIGH (ref 0.01–0.04)
WBC # BLD: 15.97 K/UL — HIGH (ref 3.8–10.5)
WBC # BLD: 16.86 K/UL — HIGH (ref 3.8–10.5)
WBC # FLD AUTO: 15.97 K/UL — HIGH (ref 3.8–10.5)
WBC # FLD AUTO: 16.86 K/UL — HIGH (ref 3.8–10.5)

## 2019-12-12 PROCEDURE — 71250 CT THORAX DX C-: CPT | Mod: 26

## 2019-12-12 PROCEDURE — 99285 EMERGENCY DEPT VISIT HI MDM: CPT

## 2019-12-12 PROCEDURE — 93010 ELECTROCARDIOGRAM REPORT: CPT

## 2019-12-12 PROCEDURE — 70450 CT HEAD/BRAIN W/O DYE: CPT | Mod: 26

## 2019-12-12 PROCEDURE — 71045 X-RAY EXAM CHEST 1 VIEW: CPT | Mod: 26

## 2019-12-12 RX ORDER — INSULIN LISPRO 100/ML
VIAL (ML) SUBCUTANEOUS
Refills: 0 | Status: DISCONTINUED | OUTPATIENT
Start: 2019-12-12 | End: 2019-12-13

## 2019-12-12 RX ORDER — DEXTROSE 50 % IN WATER 50 %
15 SYRINGE (ML) INTRAVENOUS ONCE
Refills: 0 | Status: DISCONTINUED | OUTPATIENT
Start: 2019-12-12 | End: 2019-12-13

## 2019-12-12 RX ORDER — INSULIN GLARGINE 100 [IU]/ML
10 INJECTION, SOLUTION SUBCUTANEOUS ONCE
Refills: 0 | Status: COMPLETED | OUTPATIENT
Start: 2019-12-12 | End: 2019-12-12

## 2019-12-12 RX ORDER — IPRATROPIUM/ALBUTEROL SULFATE 18-103MCG
3 AEROSOL WITH ADAPTER (GRAM) INHALATION EVERY 6 HOURS
Refills: 0 | Status: DISCONTINUED | OUTPATIENT
Start: 2019-12-12 | End: 2019-12-13

## 2019-12-12 RX ORDER — DULAGLUTIDE 4.5 MG/.5ML
0 INJECTION, SOLUTION SUBCUTANEOUS
Qty: 0 | Refills: 0 | DISCHARGE

## 2019-12-12 RX ORDER — INSULIN GLARGINE 100 [IU]/ML
0 INJECTION, SOLUTION SUBCUTANEOUS
Qty: 0 | Refills: 0 | DISCHARGE

## 2019-12-12 RX ORDER — HEPARIN SODIUM 5000 [USP'U]/ML
INJECTION INTRAVENOUS; SUBCUTANEOUS
Qty: 25000 | Refills: 0 | Status: DISCONTINUED | OUTPATIENT
Start: 2019-12-12 | End: 2019-12-13

## 2019-12-12 RX ORDER — DEXTROSE 50 % IN WATER 50 %
25 SYRINGE (ML) INTRAVENOUS ONCE
Refills: 0 | Status: DISCONTINUED | OUTPATIENT
Start: 2019-12-12 | End: 2019-12-13

## 2019-12-12 RX ORDER — INSULIN LISPRO 100/ML
VIAL (ML) SUBCUTANEOUS AT BEDTIME
Refills: 0 | Status: DISCONTINUED | OUTPATIENT
Start: 2019-12-12 | End: 2019-12-13

## 2019-12-12 RX ORDER — DEXTROSE 50 % IN WATER 50 %
12.5 SYRINGE (ML) INTRAVENOUS ONCE
Refills: 0 | Status: DISCONTINUED | OUTPATIENT
Start: 2019-12-12 | End: 2019-12-13

## 2019-12-12 RX ORDER — HEPARIN SODIUM 5000 [USP'U]/ML
4400 INJECTION INTRAVENOUS; SUBCUTANEOUS ONCE
Refills: 0 | Status: COMPLETED | OUTPATIENT
Start: 2019-12-12 | End: 2019-12-12

## 2019-12-12 RX ORDER — ERYTHROPOIETIN 10000 [IU]/ML
10000 INJECTION, SOLUTION INTRAVENOUS; SUBCUTANEOUS
Refills: 0 | Status: DISCONTINUED | OUTPATIENT
Start: 2019-12-12 | End: 2019-12-12

## 2019-12-12 RX ORDER — ACETAMINOPHEN 500 MG
650 TABLET ORAL EVERY 6 HOURS
Refills: 0 | Status: DISCONTINUED | OUTPATIENT
Start: 2019-12-12 | End: 2019-12-13

## 2019-12-12 RX ORDER — SODIUM CHLORIDE 9 MG/ML
1000 INJECTION, SOLUTION INTRAVENOUS
Refills: 0 | Status: DISCONTINUED | OUTPATIENT
Start: 2019-12-12 | End: 2019-12-13

## 2019-12-12 RX ORDER — HEPARIN SODIUM 5000 [USP'U]/ML
4400 INJECTION INTRAVENOUS; SUBCUTANEOUS EVERY 6 HOURS
Refills: 0 | Status: DISCONTINUED | OUTPATIENT
Start: 2019-12-12 | End: 2019-12-13

## 2019-12-12 RX ORDER — METOPROLOL TARTRATE 50 MG
100 TABLET ORAL DAILY
Refills: 0 | Status: DISCONTINUED | OUTPATIENT
Start: 2019-12-12 | End: 2019-12-13

## 2019-12-12 RX ORDER — INSULIN GLARGINE 100 [IU]/ML
10 INJECTION, SOLUTION SUBCUTANEOUS EVERY MORNING
Refills: 0 | Status: DISCONTINUED | OUTPATIENT
Start: 2019-12-13 | End: 2019-12-13

## 2019-12-12 RX ORDER — ERYTHROPOIETIN 10000 [IU]/ML
10000 INJECTION, SOLUTION INTRAVENOUS; SUBCUTANEOUS
Refills: 0 | Status: DISCONTINUED | OUTPATIENT
Start: 2019-12-12 | End: 2019-12-13

## 2019-12-12 RX ORDER — INSULIN LISPRO 100/ML
3 VIAL (ML) SUBCUTANEOUS
Refills: 0 | Status: DISCONTINUED | OUTPATIENT
Start: 2019-12-12 | End: 2019-12-13

## 2019-12-12 RX ORDER — GLUCAGON INJECTION, SOLUTION 0.5 MG/.1ML
1 INJECTION, SOLUTION SUBCUTANEOUS ONCE
Refills: 0 | Status: DISCONTINUED | OUTPATIENT
Start: 2019-12-12 | End: 2019-12-13

## 2019-12-12 RX ORDER — DIAZEPAM 5 MG
5 TABLET ORAL
Refills: 0 | Status: DISCONTINUED | OUTPATIENT
Start: 2019-12-12 | End: 2019-12-12

## 2019-12-12 RX ORDER — INSULIN GLARGINE 100 [IU]/ML
14 INJECTION, SOLUTION SUBCUTANEOUS
Qty: 0 | Refills: 0 | DISCHARGE

## 2019-12-12 RX ORDER — ATORVASTATIN CALCIUM 80 MG/1
40 TABLET, FILM COATED ORAL AT BEDTIME
Refills: 0 | Status: DISCONTINUED | OUTPATIENT
Start: 2019-12-12 | End: 2019-12-13

## 2019-12-12 RX ORDER — ASPIRIN/CALCIUM CARB/MAGNESIUM 324 MG
81 TABLET ORAL DAILY
Refills: 0 | Status: DISCONTINUED | OUTPATIENT
Start: 2019-12-12 | End: 2019-12-13

## 2019-12-12 RX ORDER — CLOPIDOGREL BISULFATE 75 MG/1
75 TABLET, FILM COATED ORAL DAILY
Refills: 0 | Status: DISCONTINUED | OUTPATIENT
Start: 2019-12-12 | End: 2019-12-13

## 2019-12-12 RX ORDER — AMLODIPINE BESYLATE 2.5 MG/1
1 TABLET ORAL
Qty: 0 | Refills: 0 | DISCHARGE

## 2019-12-12 RX ADMIN — Medication 3 UNIT(S): at 11:58

## 2019-12-12 RX ADMIN — ATORVASTATIN CALCIUM 40 MILLIGRAM(S): 80 TABLET, FILM COATED ORAL at 21:27

## 2019-12-12 RX ADMIN — ERYTHROPOIETIN 10000 UNIT(S): 10000 INJECTION, SOLUTION INTRAVENOUS; SUBCUTANEOUS at 16:58

## 2019-12-12 RX ADMIN — Medication 100 MILLIGRAM(S): at 09:31

## 2019-12-12 RX ADMIN — CLOPIDOGREL BISULFATE 75 MILLIGRAM(S): 75 TABLET, FILM COATED ORAL at 11:27

## 2019-12-12 RX ADMIN — Medication 3 MILLILITER(S): at 02:47

## 2019-12-12 RX ADMIN — HEPARIN SODIUM 900 UNIT(S)/HR: 5000 INJECTION INTRAVENOUS; SUBCUTANEOUS at 15:26

## 2019-12-12 RX ADMIN — Medication 81 MILLIGRAM(S): at 11:27

## 2019-12-12 RX ADMIN — Medication 3 MILLILITER(S): at 07:48

## 2019-12-12 RX ADMIN — Medication 4: at 22:06

## 2019-12-12 RX ADMIN — Medication 3 MILLILITER(S): at 20:28

## 2019-12-12 RX ADMIN — Medication 0.1 MILLIGRAM(S): at 21:26

## 2019-12-12 RX ADMIN — Medication 4: at 11:57

## 2019-12-12 RX ADMIN — INSULIN GLARGINE 10 UNIT(S): 100 INJECTION, SOLUTION SUBCUTANEOUS at 12:15

## 2019-12-12 RX ADMIN — Medication 5: at 08:08

## 2019-12-12 RX ADMIN — HEPARIN SODIUM 900 UNIT(S)/HR: 5000 INJECTION INTRAVENOUS; SUBCUTANEOUS at 23:25

## 2019-12-12 RX ADMIN — Medication 325 MILLIGRAM(S): at 02:47

## 2019-12-12 RX ADMIN — HEPARIN SODIUM 900 UNIT(S)/HR: 5000 INJECTION INTRAVENOUS; SUBCUTANEOUS at 09:00

## 2019-12-12 RX ADMIN — HEPARIN SODIUM 4400 UNIT(S): 5000 INJECTION INTRAVENOUS; SUBCUTANEOUS at 08:45

## 2019-12-12 NOTE — CONSULT NOTE ADULT - SUBJECTIVE AND OBJECTIVE BOX
Patient is a 69y Female whom presented to the hospital with     PAST MEDICAL & SURGICAL HISTORY:  Dialysis patient  CRF (chronic renal failure), unspecified stage  h/o Smoking: quitted 3/2012  Murmur, cardiac  PAD (peripheral artery disease)  h/o Hepatitis A 1969: currently resolved, no symptoms  CAD (coronary artery disease)  h/o Myocardial infarct 2007  Depression  h/o Anxiety attack  HTN (hypertension)  Diabetes mellitus II  s/p surgical removal of benign Skin lesion epigastric area  s/p Ovarian cyst removal  coronary stent 2007      MEDICATIONS  (STANDING):  albuterol/ipratropium for Nebulization 3 milliLiter(s) Nebulizer every 6 hours  aspirin enteric coated 81 milliGRAM(s) Oral daily  atorvastatin 40 milliGRAM(s) Oral at bedtime  cloNIDine 0.1 milliGRAM(s) Oral at bedtime  clopidogrel Tablet 75 milliGRAM(s) Oral daily  dextrose 5%. 1000 milliLiter(s) (50 mL/Hr) IV Continuous <Continuous>  dextrose 50% Injectable 12.5 Gram(s) IV Push once  dextrose 50% Injectable 25 Gram(s) IV Push once  dextrose 50% Injectable 25 Gram(s) IV Push once  diazepam    Tablet 5 milliGRAM(s) Oral two times a day  heparin  Infusion.  Unit(s)/Hr (9 mL/Hr) IV Continuous <Continuous>  heparin  Injectable 4400 Unit(s) IV Push once  imipramine 50 milliGRAM(s) Oral daily  insulin lispro (HumaLOG) corrective regimen sliding scale   SubCutaneous three times a day before meals  metoprolol succinate  milliGRAM(s) Oral daily  multivitamin 1 Tablet(s) Oral daily      Allergies    latex (Unknown)  No Known Drug Allergies    Intolerances        SOCIAL HISTORY:  Denies ETOh,Smoking,     FAMILY HISTORY:  No pertinent family history in first degree relatives      REVIEW OF SYSTEMS:    CONSTITUTIONAL: No weakness, fevers or chills  EYES/ENT: No visual changes;  no throat pain   NECK: No pain or stiffness  RESPIRATORY: No cough, wheezing, hemoptysis; No shortness of breath  CARDIOVASCULAR: No chest pain or palpitations  GASTROINTESTINAL: No abdominal or epigastric pain. No nausea, vomiting,     No diarrhea or constipation. No melena   GENITOURINARY: No dysuria, frequency or hematuria  NEUROLOGICAL: No numbness or weakness  SKIN: dry      VITAL:  T(C): , Max: 37.1 (12-12-19 @ 01:37)  T(F): , Max: 98.8 (12-12-19 @ 01:37)  HR: 94 (12-12-19 @ 07:49)  BP: 167/- (12-12-19 @ 07:32)  BP(mean): 79 (12-12-19 @ 07:32)  RR: 18 (12-12-19 @ 07:32)  SpO2: 100% (12-12-19 @ 07:49)  Wt(kg): --    I and O's:    Height (cm): 175.26 (12-11 @ 23:21)  Weight (kg): 71 (12-11 @ 23:21)  BMI (kg/m2): 23.1 (12-11 @ 23:21)  BSA (m2): 1.86 (12-11 @ 23:21)    PHYSICAL EXAM:    Constitutional: NAD  HEENT: conjunctive   clear   Neck:  No JVD  Respiratory: CTAB  Cardiovascular: S1 and S2  Gastrointestinal: BS+, soft, NT/ND  Extremities: No peripheral edema  Neurological: A/O x 3, no focal deficits  Psychiatric: Normal mood, normal affect  : No Renteria  Skin: No rashes  Access: Not applicable    LABS:                        10.5   16.86 )-----------( 254      ( 12 Dec 2019 00:06 )             32.2     12-12    135  |  97  |  50<H>  ----------------------------<  425<H>  4.5   |  26  |  5.90<H>    Ca    8.9      12 Dec 2019 00:06    TPro  7.5  /  Alb  3.8  /  TBili  0.4  /  DBili  x   /  AST  18  /  ALT  21  /  AlkPhos  102  12-12      Urine Studies:          RADIOLOGY & ADDITIONAL STUDIES: Patient is a 69y Female whom presented to the hospital with esrd on hd     PAST MEDICAL & SURGICAL HISTORY:  Dialysis patient  CRF (chronic renal failure), unspecified stage  h/o Smoking: quitted 3/2012  Murmur, cardiac  PAD (peripheral artery disease)  h/o Hepatitis A 1969: currently resolved, no symptoms  CAD (coronary artery disease)  h/o Myocardial infarct 2007  Depression  h/o Anxiety attack  HTN (hypertension)  Diabetes mellitus II  s/p surgical removal of benign Skin lesion epigastric area  s/p Ovarian cyst removal  coronary stent 2007      MEDICATIONS  (STANDING):  albuterol/ipratropium for Nebulization 3 milliLiter(s) Nebulizer every 6 hours  aspirin enteric coated 81 milliGRAM(s) Oral daily  atorvastatin 40 milliGRAM(s) Oral at bedtime  cloNIDine 0.1 milliGRAM(s) Oral at bedtime  clopidogrel Tablet 75 milliGRAM(s) Oral daily  dextrose 5%. 1000 milliLiter(s) (50 mL/Hr) IV Continuous <Continuous>  dextrose 50% Injectable 12.5 Gram(s) IV Push once  dextrose 50% Injectable 25 Gram(s) IV Push once  dextrose 50% Injectable 25 Gram(s) IV Push once  diazepam    Tablet 5 milliGRAM(s) Oral two times a day  heparin  Infusion.  Unit(s)/Hr (9 mL/Hr) IV Continuous <Continuous>  heparin  Injectable 4400 Unit(s) IV Push once  imipramine 50 milliGRAM(s) Oral daily  insulin lispro (HumaLOG) corrective regimen sliding scale   SubCutaneous three times a day before meals  metoprolol succinate  milliGRAM(s) Oral daily  multivitamin 1 Tablet(s) Oral daily      Allergies    latex (Unknown)  No Known Drug Allergies    Intolerances        SOCIAL HISTORY:  Denies ETOh,Smoking,     FAMILY HISTORY:  No pertinent family history in first degree relatives      REVIEW OF SYSTEMS:    CONSTITUTIONAL: No weakness, fevers or chills  EYES/ENT: No visual changes;  no throat pain   NECK: No pain or stiffness  RESPIRATORY: No cough, wheezing, hemoptysis; pos  shortness of breath  CARDIOVASCULAR: pos  chest pain , no  palpitations  GASTROINTESTINAL: No abdominal or epigastric pain. No nausea, vomiting,     No diarrhea or constipation. No melena   GENITOURINARY: No dysuria, frequency or hematuria  NEUROLOGICAL: No numbness or weakness  SKIN: dry      VITAL:  T(C): , Max: 37.1 (12-12-19 @ 01:37)  T(F): , Max: 98.8 (12-12-19 @ 01:37)  HR: 94 (12-12-19 @ 07:49)  BP: 167/- (12-12-19 @ 07:32)  BP(mean): 79 (12-12-19 @ 07:32)  RR: 18 (12-12-19 @ 07:32)  SpO2: 100% (12-12-19 @ 07:49)  Wt(kg): --    I and O's:    Height (cm): 175.26 (12-11 @ 23:21)  Weight (kg): 71 (12-11 @ 23:21)  BMI (kg/m2): 23.1 (12-11 @ 23:21)  BSA (m2): 1.86 (12-11 @ 23:21)    PHYSICAL EXAM:    Constitutional: NAD  HEENT: conjunctive   clear   Neck:  No JVD  Respiratory: decrease bs b/l   Cardiovascular: S1 and S2  Gastrointestinal: BS+, soft, NT/ND  Extremities: No peripheral edema  Neurological: A/O x 3, no focal deficits  Psychiatric: Normal mood, normal affect  : No Renteria  Skin: dry  Access: pos fistula     LABS:                        10.5   16.86 )-----------( 254      ( 12 Dec 2019 00:06 )             32.2     12-12    135  |  97  |  50<H>  ----------------------------<  425<H>  4.5   |  26  |  5.90<H>    Ca    8.9      12 Dec 2019 00:06    TPro  7.5  /  Alb  3.8  /  TBili  0.4  /  DBili  x   /  AST  18  /  ALT  21  /  AlkPhos  102  12-12      Urine Studies:          RADIOLOGY & ADDITIONAL STUDIES:

## 2019-12-12 NOTE — GOALS OF CARE CONVERSATION - ADVANCED CARE PLANNING - CONVERSATION DETAILS
Spoke to pt who requested to complete HCP . Gave assistance and educated her regarding notification of agents and discussions to have regarding her wishes. She states that she does not want resuscitation or intubation

## 2019-12-12 NOTE — CONSULT NOTE ADULT - SUBJECTIVE AND OBJECTIVE BOX
Patient is a 69y old  Female who presents with a chief complaint of CHEST PAIN AND DYSPNEA (12 Dec 2019 08:41)      Reason For Consult:     HPI:  70yo female with hx of  CAD (coronary artery disease)  ,CRF (chronic renal failure)  ,Depression  ,Diabetes mellitus II  , ESRD , Dialysis patient    h/o Anxiety attack  ,h/o Hepatitis A 1969 ,h/o Myocardial infarct 2007  ,h/o Smoking  quitted 3/2012 ,HTN (hypertension)  ,Murmur, cardiac  ,PAD (peripheral artery disease) bib EMS  with chest pain . Patient  states it started last night  with tightness and sob, diffuse, non radiating, no cough, fever, chills, no diaphoresis CTT demonstrated pulmonary edema ,found to have LARISA elevation Cardiology and nephrology consults requested by ER Admitted  to telemetry unit for monitoring , send 3 sets of cardiac ensymes to rule out coronary event, obtain ECHO to evaluate LVEF, cardiology consult ,continue current management, O2 supply, anticoagulation plan as per cardiology consult Dr ALBRIGHT .Case d/w MD on call Dr Perez ,patient will be scheduled for HD session , obtain echo to evaluate LVEF, intravenous diuresis, monitor ins/outs, monitor renal profile and electrolytes closely, cardiology consult ,send 3 sets of ensymes, O2 supply, serial chest xrays, monitor weights and oral intake of fluids, nutritionist consult Palliative care consult requested ,to discuss advance directives and complete MOLST (12 Dec 2019 06:58)      PAST MEDICAL & SURGICAL HISTORY:  Dialysis patient  CRF (chronic renal failure), unspecified stage  h/o Smoking: quitted 3/2012  Murmur, cardiac  PAD (peripheral artery disease)  h/o Hepatitis A 1969: currently resolved, no symptoms  CAD (coronary artery disease)  h/o Myocardial infarct 2007  Depression  h/o Anxiety attack  HTN (hypertension)  Diabetes mellitus II  s/p surgical removal of benign Skin lesion epigastric area  s/p Ovarian cyst removal  coronary stent 2007      FAMILY HISTORY:  No pertinent family history in first degree relatives        Social History:    MEDICATIONS  (STANDING):  albuterol/ipratropium for Nebulization 3 milliLiter(s) Nebulizer every 6 hours  aspirin enteric coated 81 milliGRAM(s) Oral daily  atorvastatin 40 milliGRAM(s) Oral at bedtime  cloNIDine 0.1 milliGRAM(s) Oral at bedtime  clopidogrel Tablet 75 milliGRAM(s) Oral daily  dextrose 5%. 1000 milliLiter(s) (50 mL/Hr) IV Continuous <Continuous>  dextrose 50% Injectable 12.5 Gram(s) IV Push once  dextrose 50% Injectable 25 Gram(s) IV Push once  dextrose 50% Injectable 25 Gram(s) IV Push once  diazepam    Tablet 5 milliGRAM(s) Oral two times a day  epoetin fawad Injectable 69993 Unit(s) IV Push <User Schedule>  heparin  Infusion.  Unit(s)/Hr (9 mL/Hr) IV Continuous <Continuous>  imipramine 50 milliGRAM(s) Oral daily  insulin lispro (HumaLOG) corrective regimen sliding scale   SubCutaneous three times a day before meals  metoprolol succinate  milliGRAM(s) Oral daily  multivitamin 1 Tablet(s) Oral daily    MEDICATIONS  (PRN):  acetaminophen   Tablet .. 650 milliGRAM(s) Oral every 6 hours PRN Temp greater or equal to 38C (100.4F), Mild Pain (1 - 3)  dextrose 40% Gel 15 Gram(s) Oral once PRN Blood Glucose LESS THAN 70 milliGRAM(s)/deciliter  glucagon  Injectable 1 milliGRAM(s) IntraMuscular once PRN Glucose LESS THAN 70 milligrams/deciliter  heparin  Injectable 4400 Unit(s) IV Push every 6 hours PRN For aPTT less than 40        T(C): 36.8 (12-12-19 @ 10:53), Max: 37.1 (12-12-19 @ 01:37)  HR: 95 (12-12-19 @ 10:53) (71 - 107)  BP: 143/84 (12-12-19 @ 10:53) (143/84 - 174/78)  RR: 14 (12-12-19 @ 10:53) (12 - 20)  SpO2: 98% (12-12-19 @ 10:53) (92% - 100%)  Wt(kg): --    PHYSICAL EXAM:  GENERAL: NAD, well-groomed, well-developed  HEAD:  Atraumatic, Normocephalic  NECK: Supple, No JVD, Normal thyroid  CHEST/LUNG: Clear to percussion bilaterally; No rales, rhonchi, wheezing, or rubs  HEART: Regular rate and rhythm; No murmurs, rubs, or gallops  ABDOMEN: Soft, Nontender, Nondistended; Bowel sounds present  EXTREMITIES:  2+ Peripheral Pulses, No clubbing, cyanosis, or edema  SKIN: No rashes or lesions    CAPILLARY BLOOD GLUCOSE      POCT Blood Glucose.: 374 mg/dL (12 Dec 2019 07:54)                            10.5   16.86 )-----------( 254      ( 12 Dec 2019 00:06 )             32.2       CMP:  12-12 @ 00:06  SGPT 21  Albumin 3.8   Alk Phos 102   Anion Gap 12   SGOT 18   Total Bili 0.4   BUN 50   Calcium Total 8.9   CO2 26   Chloride 97   Creatinine 5.90   eGFR if AA 8   eGFR if non AA 7   Glucose 425   Potassium 4.5   Protein 7.5   Sodium 135      Thyroid Function Tests:      Diabetes Tests:       Radiology:

## 2019-12-12 NOTE — H&P ADULT - NSICDXPASTMEDICALHX_GEN_ALL_CORE_FT
PAST MEDICAL HISTORY:  CAD (coronary artery disease)     CRF (chronic renal failure), unspecified stage     Depression     Diabetes mellitus II     Dialysis patient     h/o Anxiety attack     h/o Hepatitis A 1969 currently resolved, no symptoms    h/o Myocardial infarct 2007     h/o Smoking quitted 3/2012    HTN (hypertension)     Murmur, cardiac     PAD (peripheral artery disease)

## 2019-12-12 NOTE — H&P ADULT - NSICDXPASTSURGICALHX_GEN_ALL_CORE_FT
PAST SURGICAL HISTORY:  coronary stent 2007     s/p Ovarian cyst removal     s/p surgical removal of benign Skin lesion epigastric area

## 2019-12-12 NOTE — H&P ADULT - HISTORY OF PRESENT ILLNESS
70yo female with hx of  CAD (coronary artery disease)  ,CRF (chronic renal failure)  ,Depression  ,Diabetes mellitus II  , ESRD , Dialysis patient    h/o Anxiety attack  ,h/o Hepatitis A 1969 ,h/o Myocardial infarct 2007  ,h/o Smoking  quitted 3/2012 ,HTN (hypertension)  ,Murmur, cardiac  ,PAD (peripheral artery disease) bib EMS  with chest pain . Patient  states it started last night  with tightness and sob, diffuse, non radiating, no cough, fever, chills, no diaphoresis CTT demonstrated pulmonary edema ,found to have LARISA elevation Cardiology and nephrology consults requested by ER Admitted  to telemetry unit for monitoring , send 3 sets of cardiac ensymes to rule out coronary event, obtain ECHO to evaluate LVEF, cardiology consult ,continue current management, O2 supply, anticoagulation plan as per cardiology consult Dr ALBRIGHT .Case d/w MD on call Dr Perez ,patient will be scheduled for HD session , obtain echo to evaluate LVEF, intravenous diuresis, monitor ins/outs, monitor renal profile and electrolytes closely, cardiology consult ,send 3 sets of ensymes, O2 supply, serial chest xrays, monitor weights and oral intake of fluids, nutritionist consult Palliative care consult requested ,to discuss advance directives and complete MOLST

## 2019-12-12 NOTE — DIETITIAN INITIAL EVALUATION ADULT. - OTHER INFO
Pt is a 70 y/o F with PMH CAD, T2DM, ESRD on HD, HTN. p/w chest pain and dyspnea. Per GOC discussion (12/12), pt DNR/DNI with no artificial nutrition. Upon visit, pt reports some nausea without vomiting and diarrhea PTA, however currently resolved. +BM 12/11. Denies issues chewing/swallowing. NKFA. UBW ~156 lb, denies recent weight changes. Admission weight 158.9 lb. Reports good appetite/intake, no changes noted. T2DM on insulin. States she monitors BG ~20x/day, has been running high lately. Pt does not follow a therapeutic diet at home, however follows up with RD at outpatient dialysis center. Pt requested info regarding foods high in K, phosphorus, and sodium. Provided verbal/written education regarding renal diabetic diet. Pt verbalized understanding.

## 2019-12-12 NOTE — CHART NOTE - NSCHARTNOTEFT_GEN_A_CORE
Resident Rapid Response Follow-Up Note    Patient is a 69y old  Female who presents with a chief complaint of CHEST PAIN AND DYSPNEA (12 Dec 2019 12:35)      Rapid response was called at on this 69y Female patient for EPISODE OF ALTERED MENTAL STATUS     Patient was seen and examined at the bedside by the rapid response team and primary team. Dr. Coppola  at bedside.  As per nurse, patient said she was receiving dialysis and complained about feeling uncomfortable. Suddenly became unarousable, eyes rolling back and stiff. Upon arrival patient looked pale and weak. Had one episode of non-bilious, non bloody, food particle containing emesis. After patient back to baseline. Denied lightheadedness, chest pain or palpitations. Does not recall passing out.     Rapid Response Vital Signs:  BP: 129/60   HR: 84  RR: 17   SpO2: 94 % on RA  Temp: 98.2   FS: 233     Vital Signs Last 24 Hrs  T(C): 36.6 (12 Dec 2019 11:44), Max: 37.1 (12 Dec 2019 01:37)  T(F): 97.8 (12 Dec 2019 11:44), Max: 98.8 (12 Dec 2019 01:37)  HR: 88 (12 Dec 2019 11:44) (71 - 107)  BP: 148/79 (12 Dec 2019 11:44) (143/84 - 174/78)  BP(mean): --  RR: 18 (12 Dec 2019 11:44) (12 - 20)  SpO2: 97% (12 Dec 2019 11:44) (92% - 100%)    Physical Exam:  General: Pale appearing, diaphoretic, in acute distress due to nausea   HEENT: NCAT, PERRLA, EOMI bl, dry mucous  Neck: Supple, nontender, no mass  Neurology: A&Ox3, nonfocal, CN II-XII grossly intact  Respiratory: CTA B/L, No wheezing, rales, or rhonchi  CV: RRR, S1/S2 present, no murmurs, rubs, or gallops  Abdominal: Soft, nontender, non-distended, normoactive bowel sounds  Extremities: No C/C/E, peripheral pulses present  MSK: Normal ROM, no joint erythema or warmth, no joint swelling   Skin: warm, dry, normal color, no obvious rash or abnormal lesions    LABS:                        10.5   16.86 )-----------( 254      ( 12 Dec 2019 00:06 )             32.2     12 Dec 2019 00:06    135    |  97     |  50     ----------------------------<  425    4.5     |  26     |  5.90     Ca    8.9        12 Dec 2019 00:06    TPro  7.5    /  Alb  3.8    /  TBili  0.4    /  DBili  x      /  AST  18     /  ALT  21     /  AlkPhos  102    12 Dec 2019 00:06    PT/INR - ( 12 Dec 2019 07:14 )   PT: 10.9 sec;   INR: 0.97 ratio         PTT - ( 12 Dec 2019 15:02 )  PTT:71.3 sec    CAPILLARY BLOOD GLUCOSE      POCT Blood Glucose.: 233 mg/dL (12 Dec 2019 16:20)  POCT Blood Glucose.: 315 mg/dL (12 Dec 2019 11:41)  POCT Blood Glucose.: 374 mg/dL (12 Dec 2019 07:54)        RADIOLOGY & ADDITIONAL TESTS:    Imaging Personally Reviewed:  [ ] YES  [ ] NO    Consultant(s) Notes Reviewed:  [ ] YES  [ ] NO    Care Discussed with Consultants/Other Providers [ ] YES  [ ] NO    Critical care time spent at bedside and coordinating care for patient:    Assessment/Plan: 68yo female with hx of  CAD (coronary artery disease)  ,CRF (chronic renal failure)  ,Depression  ,Diabetes mellitus II  , ESRD , Dialysis patient    h/o Anxiety attack  ,h/o Hepatitis A 1969 ,h/o Myocardial infarct 2007  ,h/o Smoking  quitted 3/2012 ,HTN (hypertension)  ,Murmur, cardiac  ,PAD (peripheral artery disease) admitted to r/o ACS rapid response called for AMS likely secondary to vasovagal in setting of active dialysis.   - Third set of CE elevated (14-->15): plan for cardiac cath tomorrow   - Fu CMP, phos and mag to r/o electrolyte derangement  - Fu CBC to r/o worsening anemia    - CT head to r/o CVA   - EKG shows ST and T wave abnormalities concerning for ischemia similar to last EKG on 12/11  - Dr. Machuca made aware and agrees with plan   - Dr. James, cardiologist, made aware recommends TTE    -Will continue to follow, RN to call if any changes. Resident Rapid Response Follow-Up Note    Patient is a 69y old female who presents with a chief complaint of chest pain and SOB.      Rapid response was called previously on this 69y Female patient for an episode of altered mental status during dialysis. At that time, patient suddenly became unarousable, eyes rolling back, and stiff. Patient had one episode of NBNB, food-particle containing emesis. After episode patient returned to baseline without recollection of the event.    Patient seen and examined on follow-up. Patient awake, alert, sitting up eating dinner. States she feels much better, admits to some continued chest tightness, the same that she's had since admission. Tightness is improved, per patient, but still present. Denies any palpitations, SOB, wheezing, dizziness, lightheadedness.     Rapid Response Vital Signs:  BP: 129/60   HR: 84  RR: 17   SpO2: 94 % on RA  Temp: 98.2   FS: 233     Follow-Up Vital Signs:  Vital Signs Last 24 Hrs  T(C): 36.6 (12 Dec 2019 11:44), Max: 37.1 (12 Dec 2019 01:37)  T(F): 97.8 (12 Dec 2019 11:44), Max: 98.8 (12 Dec 2019 01:37)  HR: 88 (12 Dec 2019 11:44) (71 - 107)  BP: 148/79 (12 Dec 2019 11:44) (143/84 - 174/78)  RR: 18 (12 Dec 2019 11:44) (12 - 20)  SpO2: 97% (12 Dec 2019 11:44) (92% - 100%)    Physical Exam:  General: Pale appearing, sitting up in bed, in NAD  HEENT: PERRLA, dry mucous membranes  Neurology: A&Ox4, nonfocal, CN II-XII grossly intact, MUHAMMAD symmetrically, sensation intact to light touch throughout   Respiratory: CTA B/L, No wheezing, rales, or rhonchi  CV: RRR, S1/S2 present, no murmurs, rubs, or gallops  Abdominal: Soft, nontender, non-distended, normoactive bowel sounds  Extremities: No pitting edema bilaterally, peripheral pulses present      Assessment/Plan: 70y/o female with hx of CAD (coronary artery disease),CRF (chronic renal failure), Depression, Diabetes mellitus II, ESRD, Dialysis patient, with h/o Anxiety attack, h/o Hepatitis A 1969, h/o Myocardial infarct 2007, h/o Smoking quit 3/2012, HTN (hypertension)  ,Murmur, cardiac  ,PAD (peripheral artery disease) admitted to r/o ACS rapid response called for AMS likely secondary to vasovagal in setting of active dialysis.   - Third set of CE elevated (14-->15): plan for cardiac cath tomorrow   - Fu CMP, phos and mag to r/o electrolyte derangement  - Fu CBC to r/o worsening anemia    - CT head to r/o CVA   - EKG shows ST and T wave abnormalities concerning for ischemia similar to last EKG on 12/11  - Dr. Machuca made aware and agrees with plan   - Dr. James, cardiologist, made aware recommends TTE    -Will continue to follow, RN to call if any changes. Resident Rapid Response Follow-Up Note    Patient is a 69y old female who presents with a chief complaint of chest pain and SOB.      Rapid response was called previously on this 69y Female patient for an episode of altered mental status during dialysis. At that time, patient suddenly became unarousable, eyes rolling back, and stiff. Patient had one episode of NBNB, food-particle containing emesis. After episode patient returned to baseline without recollection of the event.    Patient seen and examined on follow-up. Patient awake, alert, sitting up eating dinner. States she feels much better, admits to some continued chest tightness, the same that she's had since admission. Tightness is improved, per patient, but still present. Denies any palpitations, SOB, wheezing, dizziness, lightheadedness.     Rapid Response Vital Signs:  BP: 129/60   HR: 84  RR: 17   SpO2: 94 % on RA  Temp: 98.2   FS: 233     Follow-Up Vital Signs:  Vital Signs Last 24 Hrs  T(C): 36.6 (12 Dec 2019 11:44), Max: 37.1 (12 Dec 2019 01:37)  T(F): 97.8 (12 Dec 2019 11:44), Max: 98.8 (12 Dec 2019 01:37)  HR: 88 (12 Dec 2019 11:44) (71 - 107)  BP: 148/79 (12 Dec 2019 11:44) (143/84 - 174/78)  RR: 18 (12 Dec 2019 11:44) (12 - 20)  SpO2: 97% (12 Dec 2019 11:44) (92% - 100%)    Physical Exam:  General: Pale appearing, sitting up in bed, in NAD  HEENT: PERRLA, dry mucous membranes  Neurology: A&Ox4, nonfocal, CN II-XII grossly intact, MUHAMMAD symmetrically, sensation intact to light touch throughout   Respiratory: CTA B/L, No wheezing, rales, or rhonchi  CV: RRR, S1/S2 present, no murmurs, rubs, or gallops  Abdominal: Soft, nontender, non-distended, normoactive bowel sounds  Extremities: No pitting edema bilaterally, peripheral pulses present      Assessment/Plan: 68y/o female with hx of CAD (coronary artery disease),CRF (chronic renal failure), Depression, Diabetes mellitus II, ESRD, Dialysis patient, with h/o Anxiety attack, h/o Hepatitis A 1969, h/o Myocardial infarct 2007, h/o Smoking quit 3/2012, HTN (hypertension), PAD (peripheral artery disease) admitted to r/o ACS rapid response called previously for AMS.  - On follow up, patient awake, alert, sitting up in bed in no acute distress. Prior event likely vasovagal in the setting of active HD.  - CT Head negative for acute intracranial pathology  - CMP with improving BUN/Cr from admission, no significant electrolyte derangements present  - Third set of CE increased 12.8 to 15.6, plan for cardiac cath tomorrow  - CBC with mildly improving white count likely reactive in setting of acute cardiac event. No significant anemia noted which could have contributed to AMS.  - EKG shows ST and T wave abnormalities concerning for ischemia similar to last EKG on 12/11  - F/u TTE results and AM EKG   - Will continue to follow, RN to call if any changes.

## 2019-12-12 NOTE — CONSULT NOTE ADULT - ASSESSMENT
pt with chest pain- nstemi  pt is started on heparin ,asa ,b.b,statin plavix  ashd   s/p mi 2006 - s/p stent   s/p stent  jan 2018 and mini cabg jan 2018  esrd - on hd jan 2018- to have hd today  dm2 - on insulin

## 2019-12-12 NOTE — CONSULT NOTE ADULT - ASSESSMENT
70yo female with hx of  CAD (coronary artery disease)  ,CRF (chronic renal failure)  ,Depression  ,Diabetes mellitus II  , ESRD , Dialysis patient    h/o Anxiety attack  ,h/o Hepatitis A 1969 ,h/o Myocardial infarct 2007  ,h/o Smoking  quitted 3/2012 ,HTN (hypertension)  ,Murmur, cardiac  ,PAD (peripheral artery disease) bib EMS  with chest pain . Patient  states it started last night  with tightness and sob, diffuse, non radiating, no cough, fever, chills, no diaphoresis CTT demonstrated pulmonary edema ,found to have LARISA elevation Cardiology and nephrology consults requested by ER Admitted  to telemetry unit for monitoring , send 3 sets of cardiac ensymes to rule out coronary event, obtain ECHO to evaluate LVEF, cardiology consult ,continue current management, O2 supply, anticoagulation plan as per cardiology consult Dr ALBRIGHT .Case d/w MD on call Dr Perez ,patient will be scheduled for HD session , obtain echo to evaluate LVEF, intravenous diuresis, monitor ins/outs, monitor renal profile and electrolytes closely, cardiology consult ,send 3 sets of ensymes, O2 supply, serial chest xrays, monitor weights and oral intake of fluids, nutritionist consult Palliative care consult requested ,to discuss advance directives and complete MOLST (12 Dec 2019 06:58)

## 2019-12-12 NOTE — H&P ADULT - RS GEN PE MLT RESP DETAILS PC
Subjective   Bri Craig is a 59 y.o. female.   Chief Complaint   Patient presents with   • Hypertension   • Diabetes     labs   • Thyroid Problem     For review and evaluation of management of chronic medical problems. Labs reviewed.   Hypertension   This is a chronic problem. The current episode started more than 1 year ago. The problem is unchanged. The problem is controlled. Pertinent negatives include no chest pain, headaches, neck pain, palpitations or shortness of breath. There are no associated agents to hypertension. Risk factors for coronary artery disease include diabetes mellitus, dyslipidemia and post-menopausal state. Past treatments include ACE inhibitors. The current treatment provides significant improvement. There are no compliance problems.    Diabetes   She presents for her follow-up diabetic visit. She has type 2 diabetes mellitus. Her disease course has been stable. There are no hypoglycemic associated symptoms. Pertinent negatives for hypoglycemia include no dizziness, headaches or nervousness/anxiousness. There are no diabetic associated symptoms. Pertinent negatives for diabetes include no chest pain, no fatigue and no weakness. There are no hypoglycemic complications. There are no diabetic complications. Risk factors for coronary artery disease include diabetes mellitus, dyslipidemia and hypertension. Current diabetic treatment includes oral agent (monotherapy). She is compliant with treatment all of the time. Her weight is stable. She is following a diabetic diet. Meal planning includes ADA exchanges. She participates in exercise intermittently. There is no change in her home blood glucose trend. An ACE inhibitor/angiotensin II receptor blocker is being taken. Eye exam is current (sees Dr. Pedroza in April).   Hypothyroidism   This is a chronic problem. The current episode started more than 1 year ago. The problem occurs constantly. The problem has been unchanged. Pertinent  negatives include no abdominal pain, arthralgias, chest pain, chills, congestion, coughing, fatigue, fever, headaches, joint swelling, myalgias, nausea, neck pain, numbness, rash, sore throat, vomiting or weakness. Nothing aggravates the symptoms. Treatments tried: levothyroxine. The treatment provided significant relief.   Hyperlipidemia   This is a chronic problem. The current episode started more than 1 year ago. The problem is controlled. Recent lipid tests were reviewed and are normal. Exacerbating diseases include diabetes and hypothyroidism. There are no known factors aggravating her hyperlipidemia. Pertinent negatives include no chest pain, myalgias or shortness of breath. The current treatment provides significant improvement of lipids. There are no compliance problems.       The following portions of the patient's history were reviewed and updated as appropriate: allergies, current medications, past social history and problem list.    Outpatient Medications Prior to Visit   Medication Sig Dispense Refill   • ACCU-CHEK SOFTCLIX LANCETS lancets by Other route. test only 1 time a day not on insulin medicare only pays for 1 stick a day     • aspirin 81 MG chewable tablet Chew 81 mg daily.     • Insulin Admin Supplies misc Accu-Chek Active Test Strips  test sugars 1 times daily     • levothyroxine (SYNTHROID, LEVOTHROID) 88 MCG tablet Take 1 tablet by mouth Daily. 90 tablet 3   • lisinopril (PRINIVIL,ZESTRIL) 5 MG tablet TAKE ONE (1) TABLET BY MOUTH EVERY DAY 90 tablet 3   • metFORMIN (GLUCOPHAGE) 500 MG tablet Take 1 tablet by mouth in the morning and 2 tablets in the evening as directed. 270 tablet 3   • omeprazole (priLOSEC) 40 MG capsule Take 1 capsule by mouth Daily As Needed (GERD). 30 capsule 11   • pravastatin (PRAVACHOL) 80 MG tablet Take 1 tablet by mouth Every Night at bedtime. 90 tablet 3   • omeprazole (PriLOSEC) 40 MG capsule TAKE ONE (1) CAPSULE BY MOUTH EVERY DAY AS NEEDED 30 capsule 11     No  "facility-administered medications prior to visit.        Review of Systems   Constitutional: Negative.  Negative for chills, fatigue, fever and unexpected weight change.   HENT: Negative.  Negative for congestion, ear pain, hearing loss, nosebleeds, rhinorrhea, sneezing, sore throat and tinnitus.    Eyes: Negative.  Negative for discharge.   Respiratory: Negative.  Negative for cough, shortness of breath and wheezing.    Cardiovascular: Negative.  Negative for chest pain and palpitations.   Gastrointestinal: Negative.  Negative for abdominal pain, constipation, diarrhea, nausea and vomiting.   Endocrine: Negative.    Genitourinary: Negative.  Negative for dysuria, frequency and urgency.   Musculoskeletal: Negative.  Negative for arthralgias, back pain, joint swelling, myalgias and neck pain.   Skin: Negative.  Negative for rash.   Allergic/Immunologic: Negative.    Neurological: Negative.  Negative for dizziness, weakness, numbness and headaches.   Hematological: Negative.  Negative for adenopathy.   Psychiatric/Behavioral: Negative.  Negative for dysphoric mood and sleep disturbance. The patient is not nervous/anxious.        Objective   Visit Vitals   • /70   • Pulse 67   • Ht 63\" (160 cm)   • Wt 148 lb 8 oz (67.4 kg)   • SpO2 95%   • BMI 26.31 kg/m2     Physical Exam   Constitutional: She is oriented to person, place, and time. She appears well-developed and well-nourished. No distress.   HENT:   Head: Normocephalic and atraumatic.       Nose: Nose normal.   Mouth/Throat: Oropharynx is clear and moist.   Eyes: Conjunctivae and EOM are normal. Pupils are equal, round, and reactive to light. Right eye exhibits no discharge. Left eye exhibits no discharge.   Neck: No thyromegaly present.   Cardiovascular: Normal rate, regular rhythm, normal heart sounds and intact distal pulses.    Pulmonary/Chest: Effort normal and breath sounds normal.   Lymphadenopathy:     She has no cervical adenopathy.   Neurological: " She is alert and oriented to person, place, and time.   Skin: Skin is warm and dry.   Psychiatric: She has a normal mood and affect.   Nursing note and vitals reviewed.      Assessment/Plan   Problem List Items Addressed This Visit        Cardiovascular and Mediastinum    Essential hypertension (Chronic)    Relevant Orders    Comprehensive Metabolic Panel    Urinalysis With / Culture If Indicated    Hyperlipidemia (Chronic)       Endocrine    Acquired hypothyroidism (Chronic)    Relevant Orders    TSH    T4, Free    Type 2 diabetes mellitus without complication, without long-term current use of insulin - Primary (Chronic)    Relevant Orders    Comprehensive Metabolic Panel    Hemoglobin A1c    Lipid Panel    MicroAlbumin, Urine, Random      Other Visit Diagnoses     Flat wart        Annual physical exam        Relevant Orders    Comprehensive Metabolic Panel    Hemoglobin A1c    Lipid Panel    MicroAlbumin, Urine, Random    TSH    T4, Free    Urinalysis With / Culture If Indicated           No orders of the defined types were placed in this encounter.    Return in about 6 months (around 3/25/2018) for Annual physical.   normal/diminished breath sounds, R/no chest wall tenderness/diminished breath sounds, L/rales

## 2019-12-12 NOTE — DIETITIAN INITIAL EVALUATION ADULT. - PROBLEM SELECTOR PLAN 3
Admit to monitored unit for cardiac monitoring, obtain echo to evaluate LVEF, intravenous diuresis prn as per Dr James , monitor ins/outs, monitor renal profile and electrolytes closely, cardiology consult ,send 3 sets of ensymes, O2 supply, serial chest xrays, monitor weights and oral intake of fluids, nutritionist consult

## 2019-12-12 NOTE — CONSULT NOTE ADULT - SUBJECTIVE AND OBJECTIVE BOX
CARDIOLOGY CONSULT NOTE    Patient is a 69y Female with a known history of :  Prophylactic measure (Z29.9)  Depression (F32.9)  DM (diabetes mellitus) (E11.9)  HTN (hypertension) (I10)  Pulmonary edema (J81.1)  ESRD (end stage renal disease) on dialysis (N18.6)  ACS (acute coronary syndrome) (I24.9)    HPI:  70yo female with hx of  CAD (coronary artery disease)  ,CRF (chronic renal failure)  ,Depression  ,Diabetes mellitus II  , ESRD , Dialysis patient    h/o Anxiety attack  ,h/o Hepatitis A 1969 ,h/o Myocardial infarct 2007  ,h/o Smoking  quitted 3/2012 ,HTN (hypertension)  ,Murmur, cardiac  ,PAD (peripheral artery disease) bib EMS  with chest pain . Patient  states it started last night  with tightness and sob, diffuse, non radiating, no cough, fever, chills, no diaphoresis CTT demonstrated pulmonary edema ,found to have LARISA elevation Cardiology and nephrology consults requested by ER Admitted  to telemetry unit for monitoring , send 3 sets of cardiac ensymes to rule out coronary event, obtain ECHO to evaluate LVEF, cardiology consult ,continue current management, O2 supply, anticoagulation plan as per cardiology consult Dr ALBRIGHT .Case d/w MD on call Dr Perez ,patient will be scheduled for HD session , obtain echo to evaluate LVEF, intravenous diuresis, monitor ins/outs, monitor renal profile and electrolytes closely, cardiology consult ,send 3 sets of ensymes, O2 supply, serial chest xrays, monitor weights and oral intake of fluids, nutritionist consult Palliative care consult requested ,to discuss advance directives and complete MOLST (12 Dec 2019 06:58)      REVIEW OF SYSTEMS:    CONSTITUTIONAL: No fever, weight loss, or fatigue  EYES: No eye pain, visual disturbances, or discharge  ENMT:  No difficulty hearing, tinnitus, vertigo; No sinus or throat pain  NECK: No pain or stiffness  BREASTS: No pain, masses, or nipple discharge  RESPIRATORY: No cough, wheezing, chills or hemoptysis; No shortness of breath  CARDIOVASCULAR: No chest pain, palpitations, dizziness, or leg swelling  GASTROINTESTINAL: No abdominal or epigastric pain. No nausea, vomiting, or hematemesis; No diarrhea or constipation. No melena or hematochezia.  GENITOURINARY: No dysuria, frequency, hematuria, or incontinence  NEUROLOGICAL: No headaches, memory loss, loss of strength, numbness, or tremors  SKIN: No itching, burning, rashes, or lesions   LYMPH NODES: No enlarged glands  ENDOCRINE: No heat or cold intolerance; No hair loss  MUSCULOSKELETAL: No joint pain or swelling; No muscle, back, or extremity pain  PSYCHIATRIC: No depression, anxiety, mood swings, or difficulty sleeping  HEME/LYMPH: No easy bruising, or bleeding gums  ALLERGY AND IMMUNOLOGIC: No hives or eczema    MEDICATIONS  (STANDING):  albuterol/ipratropium for Nebulization 3 milliLiter(s) Nebulizer every 6 hours  aspirin enteric coated 81 milliGRAM(s) Oral daily  atorvastatin 40 milliGRAM(s) Oral at bedtime  cloNIDine 0.1 milliGRAM(s) Oral at bedtime  clopidogrel Tablet 75 milliGRAM(s) Oral daily  dextrose 5%. 1000 milliLiter(s) (50 mL/Hr) IV Continuous <Continuous>  dextrose 50% Injectable 12.5 Gram(s) IV Push once  dextrose 50% Injectable 25 Gram(s) IV Push once  dextrose 50% Injectable 25 Gram(s) IV Push once  diazepam    Tablet 5 milliGRAM(s) Oral two times a day  heparin  Infusion.  Unit(s)/Hr (9 mL/Hr) IV Continuous <Continuous>  heparin  Injectable 4400 Unit(s) IV Push once  imipramine 50 milliGRAM(s) Oral daily  insulin lispro (HumaLOG) corrective regimen sliding scale   SubCutaneous three times a day before meals  metoprolol succinate  milliGRAM(s) Oral daily  multivitamin 1 Tablet(s) Oral daily    MEDICATIONS  (PRN):  acetaminophen   Tablet .. 650 milliGRAM(s) Oral every 6 hours PRN Temp greater or equal to 38C (100.4F), Mild Pain (1 - 3)  dextrose 40% Gel 15 Gram(s) Oral once PRN Blood Glucose LESS THAN 70 milliGRAM(s)/deciliter  glucagon  Injectable 1 milliGRAM(s) IntraMuscular once PRN Glucose LESS THAN 70 milligrams/deciliter  heparin  Injectable 4400 Unit(s) IV Push every 6 hours PRN For aPTT less than 40      ALLERGIES: latex (Unknown)  No Known Drug Allergies      Social History:     FAMILY HISTORY:  No pertinent family history in first degree relatives      PAST MEDICAL & SURGICAL HISTORY:  Dialysis patient  CRF (chronic renal failure), unspecified stage  h/o Smoking: quitted 3/2012  Murmur, cardiac  PAD (peripheral artery disease)  h/o Hepatitis A 1969: currently resolved, no symptoms  CAD (coronary artery disease)  h/o Myocardial infarct 2007  Depression  h/o Anxiety attack  HTN (hypertension)  Diabetes mellitus II  s/p surgical removal of benign Skin lesion epigastric area  s/p Ovarian cyst removal  coronary stent 2007        PHYSICAL EXAMINATION:  -----------------------------  T(C): 36.7 (12-12-19 @ 07:32), Max: 37.1 (12-12-19 @ 01:37)  HR: 97 (12-12-19 @ 07:32) (92 - 107)  BP: 167/- (12-12-19 @ 07:32) (155/80 - 174/78)  RR: 18 (12-12-19 @ 07:32) (15 - 20)  SpO2: 100% (12-12-19 @ 07:32) (92% - 100%)  Wt(kg): --    Height (cm): 175.26 (12-11 @ 23:21)  Weight (kg): 71 (12-11 @ 23:21)  BMI (kg/m2): 23.1 (12-11 @ 23:21)  BSA (m2): 1.86 (12-11 @ 23:21)    Constitutional: well developed, normal appearance, well groomed, well nourished, no deformities and no acute distress.   Eyes: the conjunctiva exhibited no abnormalities and the eyelids demonstrated no xanthelasmas.   HEENT: normal oral mucosa, no oral pallor and no oral cyanosis.   Neck: normal jugular venous A waves present, normal jugular venous V waves present and no jugular venous wilson A waves.   Pulmonary: no respiratory distress, normal respiratory rhythm and effort, no accessory muscle use and lungs were clear to auscultation bilaterally.   Cardiovascular: heart rate and rhythm were normal, normal S1 and S2 and no murmur, gallop, rub, heave or thrill are present.   Abdomen: soft, non-tender, no hepato-splenomegaly and no abdominal mass palpated.   Musculoskeletal: the gait could not be assessed..   Extremities: no clubbing of the fingernails, no localized cyanosis, no petechial hemorrhages and no ischemic changes.   Skin: normal skin color and pigmentation, no rash, no venous stasis, no skin lesions, no skin ulcer and no xanthoma was observed.   Psychiatric: oriented to person, place, and time, the affect was normal, the mood was normal and not feeling anxious.     LABS:   --------  12-12    135  |  97  |  50<H>  ----------------------------<  425<H>  4.5   |  26  |  5.90<H>    Ca    8.9      12 Dec 2019 00:06    TPro  7.5  /  Alb  3.8  /  TBili  0.4  /  DBili  x   /  AST  18  /  ALT  21  /  AlkPhos  102  12-12                         10.5   16.86 )-----------( 254      ( 12 Dec 2019 00:06 )             32.2         12-12 @ 06:15 CPK total:--, CKMB --, Troponin I - 12.800 ng/mL<H>  12-12 @ 00:06 CPK total:--, CKMB --, Troponin I - .132 ng/mL<H>          RADIOLOGY:  -----------------        ECG:     ECHO

## 2019-12-12 NOTE — CONSULT NOTE ADULT - SUBJECTIVE AND OBJECTIVE BOX
SHILO KOHLER    PLV TELE 311 W1    Patient is a 69y old  Female who presents with a chief complaint of CHEST PAIN AND DYSPNEA (12 Dec 2019 11:22)       Allergies    latex (Unknown)  No Known Drug Allergies    Intolerances        HPI:  68yo female with hx of  CAD (coronary artery disease)  ,CRF (chronic renal failure)  ,Depression  ,Diabetes mellitus II  , ESRD , Dialysis patient    h/o Anxiety attack  ,h/o Hepatitis A 1969 ,h/o Myocardial infarct 2007  ,h/o Smoking  quitted 3/2012 ,HTN (hypertension)  ,Murmur, cardiac  ,PAD (peripheral artery disease) bib EMS  with chest pain . Patient  states it started last night  with tightness and sob, diffuse, non radiating, no cough, fever, chills, no diaphoresis CTT demonstrated pulmonary edema ,found to have LARISA elevation Cardiology and nephrology consults requested by ER Admitted  to telemetry unit for monitoring , send 3 sets of cardiac ensymes to rule out coronary event, obtain ECHO to evaluate LVEF, cardiology consult ,continue current management, O2 supply, anticoagulation plan as per cardiology consult Dr ALBRIGHT .Case d/w MD on call Dr Perez ,patient will be scheduled for HD session , obtain echo to evaluate LVEF, intravenous diuresis, monitor ins/outs, monitor renal profile and electrolytes closely, cardiology consult ,send 3 sets of ensymes, O2 supply, serial chest xrays, monitor weights and oral intake of fluids, nutritionist consult Palliative care consult requested ,to discuss advance directives and complete MOLST (12 Dec 2019 06:58)      PAST MEDICAL & SURGICAL HISTORY:  Dialysis patient  CRF (chronic renal failure), unspecified stage  h/o Smoking: quitted 3/2012  Murmur, cardiac  PAD (peripheral artery disease)  h/o Hepatitis A 1969: currently resolved, no symptoms  CAD (coronary artery disease)  h/o Myocardial infarct 2007  Depression  h/o Anxiety attack  HTN (hypertension)  Diabetes mellitus II  s/p surgical removal of benign Skin lesion epigastric area  s/p Ovarian cyst removal  coronary stent 2007      FAMILY HISTORY:  No pertinent family history in first degree relatives        MEDICATIONS   acetaminophen   Tablet .. 650 milliGRAM(s) Oral every 6 hours PRN  albuterol/ipratropium for Nebulization 3 milliLiter(s) Nebulizer every 6 hours  aspirin enteric coated 81 milliGRAM(s) Oral daily  atorvastatin 40 milliGRAM(s) Oral at bedtime  cloNIDine 0.1 milliGRAM(s) Oral at bedtime  clopidogrel Tablet 75 milliGRAM(s) Oral daily  dextrose 40% Gel 15 Gram(s) Oral once PRN  dextrose 5%. 1000 milliLiter(s) IV Continuous <Continuous>  dextrose 50% Injectable 12.5 Gram(s) IV Push once  dextrose 50% Injectable 25 Gram(s) IV Push once  dextrose 50% Injectable 25 Gram(s) IV Push once  diazepam    Tablet 5 milliGRAM(s) Oral two times a day  epoetin fawad Injectable 72349 Unit(s) IV Push <User Schedule>  glucagon  Injectable 1 milliGRAM(s) IntraMuscular once PRN  heparin  Infusion.  Unit(s)/Hr IV Continuous <Continuous>  heparin  Injectable 4400 Unit(s) IV Push every 6 hours PRN  imipramine 50 milliGRAM(s) Oral daily  insulin lispro (HumaLOG) corrective regimen sliding scale   SubCutaneous three times a day before meals  insulin lispro Injectable (HumaLOG) 3 Unit(s) SubCutaneous three times a day before meals  metoprolol succinate  milliGRAM(s) Oral daily  multivitamin 1 Tablet(s) Oral daily      Vital Signs Last 24 Hrs  T(C): 36.6 (12 Dec 2019 11:44), Max: 37.1 (12 Dec 2019 01:37)  T(F): 97.8 (12 Dec 2019 11:44), Max: 98.8 (12 Dec 2019 01:37)  HR: 88 (12 Dec 2019 11:44) (71 - 107)  BP: 148/79 (12 Dec 2019 11:44) (143/84 - 174/78)  BP(mean): --  RR: 18 (12 Dec 2019 11:44) (12 - 20)  SpO2: 97% (12 Dec 2019 11:44) (92% - 100%)            LABS:                        10.5   16.86 )-----------( 254      ( 12 Dec 2019 00:06 )             32.2     12-12    135  |  97  |  50<H>  ----------------------------<  425<H>  4.5   |  26  |  5.90<H>    Ca    8.9      12 Dec 2019 00:06    TPro  7.5  /  Alb  3.8  /  TBili  0.4  /  DBili  x   /  AST  18  /  ALT  21  /  AlkPhos  102  12-12    PT/INR - ( 12 Dec 2019 07:14 )   PT: 10.9 sec;   INR: 0.97 ratio         PTT - ( 12 Dec 2019 07:14 )  PTT:23.7 sec          WBC:  WBC Count: 16.86 K/uL (12-12 @ 00:06)      MICROBIOLOGY:  RECENT CULTURES:        CARDIAC MARKERS ( 12 Dec 2019 06:15 )  12.800 ng/mL / x     / 283 U/L / x     / 19.7 ng/mL  CARDIAC MARKERS ( 12 Dec 2019 00:06 )  .132 ng/mL / x     / 73 U/L / x     / x            PT/INR - ( 12 Dec 2019 07:14 )   PT: 10.9 sec;   INR: 0.97 ratio         PTT - ( 12 Dec 2019 07:14 )  PTT:23.7 sec    Sodium:  Sodium, Serum: 135 mmol/L (12-12 @ 00:06)      5.90 mg/dL 12-12 @ 00:06      Hemoglobin:  Hemoglobin: 10.5 g/dL (12-12 @ 00:06)      Platelets: Platelet Count - Automated: 254 K/uL (12-12 @ 00:06)      LIVER FUNCTIONS - ( 12 Dec 2019 00:06 )  Alb: 3.8 g/dL / Pro: 7.5 g/dL / ALK PHOS: 102 U/L / ALT: 21 U/L / AST: 18 U/L / GGT: x                 RADIOLOGY & ADDITIONAL STUDIES:

## 2019-12-12 NOTE — ED ADULT NURSE REASSESSMENT NOTE - NS ED NURSE REASSESS COMMENT FT1
report to Lupe OHARA  Pt will transfer with cardiac monitor and RN  denies pain VSS
rt arm av shunt present.  Pt will go for dialysis today per Dr Cárdenas.  Waiting for PT results to start Heparin

## 2019-12-12 NOTE — PHARMACOTHERAPY INTERVENTION NOTE - COMMENTS
Patient ordered valium 5mg BID however upon reviewing iSTOP, no record of taking med at home - s/w Dr James, discussed discontinuing as this is not home regimen; MD agreed

## 2019-12-12 NOTE — H&P ADULT - NSHPLABSRESULTS_GEN_ALL_CORE
< from: CT Chest No Cont (12.12.19 @ 01:38) >    CHEST:     LUNGS AND LARGE AIRWAYS: Patent central airways.  Interlobular septal   thickening predominantly at the lung apices and lung bases. Patchy   groundglass opacity inthe lower lobes. Subsegmental atelectasis in the   lingula.  PLEURA: Small bilateral pleural effusions.  VESSELS: Normal caliber thoracic aorta and main pulmonary artery.   Atherosclerotic calcifications of the thoracic aorta, proximal great   vessels, and coronary arteries.  HEART: Heart is mildly enlarged. Trace pericardial fluid.  MEDIASTINUM AND ZOHAIB: No lymphadenopathy.  CHEST WALL AND LOWER NECK: Within normal limits.  VISUALIZED UPPER ABDOMEN: Calcified granuloma in the right hepatic lobe.  Calcified perigastric lymph node.  BONES: Degenerative changes of the spine.    IMPRESSION:     Pulmonary edema and small bilateral pleural effusions.    < end of copied text >    < from: TTE Echo Doppler w/o Cont (05.01.18 @ 11:08) >    Left Ventricle: The left ventricle appears to be normal in size with   moderate segmental dysfunction, estimated LVEF of 30%. The inferior,   posterior and lateral walls are all severely hypokinetic to akinetic. The   basal inferior wall appears thinned and aneurysmal. The apex is   hypokinetic, as is the distal anteroseptum. No apical thrombus is   identified.   Right Ventricle: normal size and systolic function.  Pericardium/Pleura: Bilateral pleural effusion, no significant   pericardial effusion.  Pulmonary/RV Pressure: Inadequate TR jet to estimate PA systolic pressure.  LV Diastolic Function:  E:E' is consistent with elevated left heart   filling pressure.    The left ventricle appears to be normal in size with moderate segmental   dysfunction, estimated LVEF of 30%. The inferior, posterior and lateral   walls are all severely hypokinetic to akinetic. The basal inferior wall   appears thinned and aneurysmal.The apex is hypokinetic, as is the distal   anteroseptum. No apical thrombus is identified. Normal right ventricular   size and systolic function.  E:E' is consistent with elevated left heart   filling pressure. Normal biatrial size. The aortic root is normal in   size. Mac, moderate MR. The aortic valve is calcified without stenosis or   insufficiency. Trace physiologic TR. Inadequate TR jet to estimate PA   systolic pressure. Bilateral pleural effusions. No significant   pericardial effusion.    < end of copied text >

## 2019-12-12 NOTE — CONSULT NOTE ADULT - ASSESSMENT
cont rx cont rx    JOSE F [facility] P [phone]   1950 DOA 2019 DR HARRY LARSON   ALLERGY   latex      CONTACT        Sp Geoffrey R        Initial evaluation/Pulmonary Critical Care consultation requested on 2019   by Dr Larson  from Dr Landeros   Patient examined chart reviewed    HOSPITAL ADMISSION   PATIENT CAME  FROM (if information available)      REVIEW OF SYMPTOMS      Able to give ROS  Yes     RELIABLE No   CONSTITUTIONAL Weakness Yes  Chills No Vision changes No  ENDOCRINE No unexplained hair loss No heat or cold intolerance    ALLERGY No hives  Sore throat No   RESP Coughing blood no  Shortness of breath YES   NEURO No Headache  Confusion Pain neck No   CARDIAC No Chest pain No Palpitations   GI No Pain abdomen NO   Vomiting NO     PHYSICAL EXAM    HEENT Unremarkable PERRLA atraumatic   RESP Fair air entry EXP prolonged    Harsh breath sound Resp distres mild   CARDIAC S1 S2 No S3     NO JVD    ABDOMEN SOFT BS PRESENT NOT DISTENDED No hepatosplenomegaly PEDAL EDEMA present No calf tenderness  NO rash   GENERAL Not TOXIC looking    EVENTS/VITALS/LABS       2019 afeb 88 148/79 18 97%   2019 W 16.8 Hb 10.5 Plt 254  Na 135 K 4.5 CO2 26 Cr 5.9       JOSE F [facility] P [phone]   1950 DOA 2019 DR HARRY LARSON   ALLERGY   latex      CONTACT        Sp Geoffrey R         PT DATA/BEST PRACTICE  ADVANCED DIRECTIVE       CODE STATUS: [ ] full code  [ ] DNR  [ ] DNI  [ ] MOLST  Goals of care discussion: [ ] yes   WT    69            BMI    23                                                                                                       HEAD OF BED ELEVATION Yes  DIET [facility] renal ()   IV F          DVT PROPHYLAXIS    iv ufh () (MI)             STRESS ULCER PROPHYLAXIS                PATIENT DESCRIPTION        69 f former smoker quit 3 y 2 ppd Not on Home O2 or nebs or bpap with PMH CABG ho 2 stents lats stent 2019 [facility] admitted 2019 with chest pressure ruled in for MI Chest discomfort since  p Pulm consulted 2019 COPD chf resp distress     er vitals 155/80 107 96f     Home meds Valium 5.3 cyclobenzaprine 10.3 metoprolol 75 imipramine 50 clonicide plavix 75 atorvastat 40 asa norvasc lantus 14     PULMONARY/CRITICAL CARE ASSESSMENT/RECOMMENDATIONS                               INFECTION   No fever chills   W 2019 W 16.8   P 2019 PC .66   X 12/12/2019 CXR cm congestive changes Patchy airspace disesp r base   M 2019 Flu AB n RSV n   2019 Clinically infection unlikely Agree with deferring abio    COPD   duoneb ()   Cont Rx    MI   2019 Tr 1 12.8  2019   2019 MB 19   MEDS ASA 81 () plavix 75 ()  IV ufh () atorvastat 40 () metoprolol 100 ()   Cath planned for     ANEMIA norm   2019 Hb 10.5   Monitor serially    DM  Lantus 10 ()     ESRD   HD as per Dr Perez      TIME SPENT Over 55 minutes aggregate care time spent on encounter; activities included   direct pt care, counseling and/or coordinating care reviewing notes, lab data/ imaging , discussion with multidisciplinary team/ pt /family. Risks, benefits, alternatives  discussed in detail.

## 2019-12-12 NOTE — CHART NOTE - NSCHARTNOTEFT_GEN_A_CORE
Resident Rapid Response Note    Patient is a 69y old  Female who presents with a chief complaint of CHEST PAIN AND DYSPNEA (12 Dec 2019 12:35)      Rapid response was called at on this 69y Female patient for EPISODE OF ALTERED MENTAL STATUS     Patient was seen and examined at the bedside by the rapid response team and primary team. Dr. Coppola  at bedside.  As per nurse, patient said she was receiving dialysis and complained about feeling uncomfortable. Suddenly became unarousable, eyes rolling back and stiff. Upon arrival patient looked pale and weak. Had one episode of non-bilious, non bloody, food particle containing emesis. After patient back to baseline. Denied lightheadedness, chest pain or palpitations. Does not recall passing out.     Rapid Response Vital Signs:  BP: 129/60   HR: 84  RR: 17   SpO2: 94 % on RA  Temp: 98.2   FS: 233     Vital Signs Last 24 Hrs  T(C): 36.6 (12 Dec 2019 11:44), Max: 37.1 (12 Dec 2019 01:37)  T(F): 97.8 (12 Dec 2019 11:44), Max: 98.8 (12 Dec 2019 01:37)  HR: 88 (12 Dec 2019 11:44) (71 - 107)  BP: 148/79 (12 Dec 2019 11:44) (143/84 - 174/78)  BP(mean): --  RR: 18 (12 Dec 2019 11:44) (12 - 20)  SpO2: 97% (12 Dec 2019 11:44) (92% - 100%)    Physical Exam:  General: Pale appearing, diaphoretic, in acute distress due to nausea   HEENT: NCAT, PERRLA, EOMI bl, dry mucous  Neck: Supple, nontender, no mass  Neurology: A&Ox3, nonfocal, CN II-XII grossly intact  Respiratory: CTA B/L, No wheezing, rales, or rhonchi  CV: RRR, S1/S2 present, no murmurs, rubs, or gallops  Abdominal: Soft, nontender, non-distended, normoactive bowel sounds  Extremities: No C/C/E, peripheral pulses present  MSK: Normal ROM, no joint erythema or warmth, no joint swelling   Skin: warm, dry, normal color, no obvious rash or abnormal lesions    LABS:                        10.5   16.86 )-----------( 254      ( 12 Dec 2019 00:06 )             32.2     12 Dec 2019 00:06    135    |  97     |  50     ----------------------------<  425    4.5     |  26     |  5.90     Ca    8.9        12 Dec 2019 00:06    TPro  7.5    /  Alb  3.8    /  TBili  0.4    /  DBili  x      /  AST  18     /  ALT  21     /  AlkPhos  102    12 Dec 2019 00:06    PT/INR - ( 12 Dec 2019 07:14 )   PT: 10.9 sec;   INR: 0.97 ratio         PTT - ( 12 Dec 2019 15:02 )  PTT:71.3 sec    CAPILLARY BLOOD GLUCOSE      POCT Blood Glucose.: 233 mg/dL (12 Dec 2019 16:20)  POCT Blood Glucose.: 315 mg/dL (12 Dec 2019 11:41)  POCT Blood Glucose.: 374 mg/dL (12 Dec 2019 07:54)        RADIOLOGY & ADDITIONAL TESTS:    Imaging Personally Reviewed:  [ ] YES  [ ] NO    Consultant(s) Notes Reviewed:  [ ] YES  [ ] NO    Care Discussed with Consultants/Other Providers [ ] YES  [ ] NO    Critical care time spent at bedside and coordinating care for patient:    Assessment/Plan: 70yo female with hx of  CAD (coronary artery disease)  ,CRF (chronic renal failure)  ,Depression  ,Diabetes mellitus II  , ESRD , Dialysis patient    h/o Anxiety attack  ,h/o Hepatitis A 1969 ,h/o Myocardial infarct 2007  ,h/o Smoking  quitted 3/2012 ,HTN (hypertension)  ,Murmur, cardiac  ,PAD (peripheral artery disease) admitted to r/o ACS rapid response called for AMS likely secondary to vasovagal in setting of active dialysis.   - Third set of CE elevated (14-->15): plan for cardiac cath tomorrow   - Fu CMP, phos and mag to r/o electrolyte derangement  - Fu CBC to r/o worsening anemia    - CT head to r/o CVA   - EKG shows ST and T wave abnormalities concerning for ischemia similar to last EKG on 12/11  - Dr. Machuca made aware and agrees with plan   - Dr. James, cardiologist, made aware recommends TTE    -Will continue to follow, RN to call if any changes.    Dr. Singletary   PGY-1 [phone]

## 2019-12-12 NOTE — H&P ADULT - ATTENDING COMMENTS
68yo female with hx of  CAD (coronary artery disease)  ,CRF (chronic renal failure)  ,Depression  ,Diabetes mellitus II  , ESRD , Dialysis patient    h/o Anxiety attack  ,h/o Hepatitis A 1969 ,h/o Myocardial infarct 2007  ,h/o Smoking  quitted 3/2012 ,HTN (hypertension)  ,Murmur, cardiac  ,PAD (peripheral artery disease) bib EMS  with chest pain . Patient  states it started last night  with tightness and sob, diffuse, non radiating, no cough, fever, chills, no diaphoresis CTT demonstrated pulmonary edema ,found to have LARISA elevation Cardiology and nephrology consults requested by ER Admitted  to telemetry unit for monitoring , send 3 sets of cardiac ensymes to rule out coronary event, obtain ECHO to evaluate LVEF, cardiology consult ,continue current management, O2 supply, anticoagulation plan as per cardiology consult Dr ALBRIGHT .Case d/w MD on call Dr Perez ,patient will be scheduled for HD session , obtain echo to evaluate LVEF, intravenous diuresis, monitor ins/outs, monitor renal profile and electrolytes closely, cardiology consult ,send 3 sets of ensymes, O2 supply, serial chest xrays, monitor weights and oral intake of fluids, nutritionist consult Palliative care consult requested ,to discuss advance directives and complete MOLST

## 2019-12-12 NOTE — PHARMACOTHERAPY INTERVENTION NOTE - COMMENTS
Recommended reduced frequency of epoetin for hgb>=10.5. (10.5)  Per telephone conversation changed 3 times a week frequency to once a week 45497 units.

## 2019-12-12 NOTE — CONSULT NOTE ADULT - PROBLEM SELECTOR RECOMMENDATION 9
add lantus 10 units qam  add humalog 3 units 3x/day before meals  cont low dose humalog scale coverage  goal bg 100-180 in hosp setting  cont cons cho diet

## 2019-12-12 NOTE — ED ADULT NURSE NOTE - CHPI ED NUR SYMPTOMS NEG
no nausea/no syncope/no congestion/no back pain/no shortness of breath/no chills/no diaphoresis/no dizziness/no vomiting/no fever

## 2019-12-13 ENCOUNTER — INPATIENT (INPATIENT)
Facility: HOSPITAL | Age: 69
LOS: 2 days | Discharge: LEFT BEFORE DISCHARGE | DRG: 280 | End: 2019-12-16
Attending: INTERNAL MEDICINE | Admitting: INTERNAL MEDICINE
Payer: MEDICARE

## 2019-12-13 ENCOUNTER — TRANSCRIPTION ENCOUNTER (OUTPATIENT)
Age: 69
End: 2019-12-13

## 2019-12-13 VITALS
DIASTOLIC BLOOD PRESSURE: 62 MMHG | RESPIRATION RATE: 18 BRPM | WEIGHT: 158.07 LBS | SYSTOLIC BLOOD PRESSURE: 127 MMHG | HEART RATE: 97 BPM | OXYGEN SATURATION: 98 % | TEMPERATURE: 98 F

## 2019-12-13 VITALS
SYSTOLIC BLOOD PRESSURE: 143 MMHG | TEMPERATURE: 98 F | OXYGEN SATURATION: 94 % | RESPIRATION RATE: 16 BRPM | DIASTOLIC BLOOD PRESSURE: 67 MMHG | HEART RATE: 97 BPM

## 2019-12-13 DIAGNOSIS — I21.4 NON-ST ELEVATION (NSTEMI) MYOCARDIAL INFARCTION: ICD-10-CM

## 2019-12-13 DIAGNOSIS — I10 ESSENTIAL (PRIMARY) HYPERTENSION: ICD-10-CM

## 2019-12-13 DIAGNOSIS — I25.10 ATHEROSCLEROTIC HEART DISEASE OF NATIVE CORONARY ARTERY WITHOUT ANGINA PECTORIS: ICD-10-CM

## 2019-12-13 DIAGNOSIS — E11.9 TYPE 2 DIABETES MELLITUS WITHOUT COMPLICATIONS: ICD-10-CM

## 2019-12-13 DIAGNOSIS — Z99.2 DEPENDENCE ON RENAL DIALYSIS: ICD-10-CM

## 2019-12-13 LAB
ANION GAP SERPL CALC-SCNC: 13 MMOL/L — SIGNIFICANT CHANGE UP (ref 5–17)
APPEARANCE UR: ABNORMAL
APTT BLD: 53.6 SEC — HIGH (ref 28.5–37)
BILIRUB UR-MCNC: NEGATIVE — SIGNIFICANT CHANGE UP
BUN SERPL-MCNC: 49 MG/DL — HIGH (ref 7–23)
CALCIUM SERPL-MCNC: 9.3 MG/DL — SIGNIFICANT CHANGE UP (ref 8.5–10.1)
CHLORIDE SERPL-SCNC: 95 MMOL/L — LOW (ref 96–108)
CO2 SERPL-SCNC: 26 MMOL/L — SIGNIFICANT CHANGE UP (ref 22–31)
COLOR SPEC: YELLOW — SIGNIFICANT CHANGE UP
CREAT SERPL-MCNC: 6 MG/DL — HIGH (ref 0.5–1.3)
DIFF PNL FLD: ABNORMAL
GLUCOSE BLDC GLUCOMTR-MCNC: 285 MG/DL — HIGH (ref 70–99)
GLUCOSE BLDC GLUCOMTR-MCNC: 298 MG/DL — HIGH (ref 70–99)
GLUCOSE BLDC GLUCOMTR-MCNC: 318 MG/DL — HIGH (ref 70–99)
GLUCOSE BLDC GLUCOMTR-MCNC: 374 MG/DL — HIGH (ref 70–99)
GLUCOSE BLDC GLUCOMTR-MCNC: 482 MG/DL — CRITICAL HIGH (ref 70–99)
GLUCOSE SERPL-MCNC: 306 MG/DL — HIGH (ref 70–99)
GLUCOSE UR QL: 1000 MG/DL
HBA1C BLD-MCNC: 9.1 % — HIGH (ref 4–5.6)
HBV CORE AB SER-ACNC: REACTIVE
HBV SURFACE AB SER-ACNC: 71.7 MIU/ML — SIGNIFICANT CHANGE UP
HBV SURFACE AG SER-ACNC: SIGNIFICANT CHANGE UP
HCT VFR BLD CALC: 29.5 % — LOW (ref 34.5–45)
HCV AB S/CO SERPL IA: 0.08 S/CO — SIGNIFICANT CHANGE UP (ref 0–0.99)
HCV AB SERPL-IMP: SIGNIFICANT CHANGE UP
HGB BLD-MCNC: 9.7 G/DL — LOW (ref 11.5–15.5)
KETONES UR-MCNC: ABNORMAL
LEUKOCYTE ESTERASE UR-ACNC: ABNORMAL
MCHC RBC-ENTMCNC: 31.1 PG — SIGNIFICANT CHANGE UP (ref 27–34)
MCHC RBC-ENTMCNC: 32.9 GM/DL — SIGNIFICANT CHANGE UP (ref 32–36)
MCV RBC AUTO: 94.6 FL — SIGNIFICANT CHANGE UP (ref 80–100)
NITRITE UR-MCNC: NEGATIVE — SIGNIFICANT CHANGE UP
NRBC # BLD: 0 /100 WBCS — SIGNIFICANT CHANGE UP (ref 0–0)
PH UR: 9 — HIGH (ref 5–8)
PLATELET # BLD AUTO: 255 K/UL — SIGNIFICANT CHANGE UP (ref 150–400)
POTASSIUM SERPL-MCNC: 4.6 MMOL/L — SIGNIFICANT CHANGE UP (ref 3.5–5.3)
POTASSIUM SERPL-SCNC: 4.6 MMOL/L — SIGNIFICANT CHANGE UP (ref 3.5–5.3)
PROT UR-MCNC: 500 MG/DL
RBC # BLD: 3.12 M/UL — LOW (ref 3.8–5.2)
RBC # FLD: 15.7 % — HIGH (ref 10.3–14.5)
SODIUM SERPL-SCNC: 134 MMOL/L — LOW (ref 135–145)
SP GR SPEC: 1.01 — SIGNIFICANT CHANGE UP (ref 1.01–1.02)
UROBILINOGEN FLD QL: NEGATIVE — SIGNIFICANT CHANGE UP
WBC # BLD: 15.63 K/UL — HIGH (ref 3.8–10.5)
WBC # FLD AUTO: 15.63 K/UL — HIGH (ref 3.8–10.5)

## 2019-12-13 PROCEDURE — 93454 CORONARY ARTERY ANGIO S&I: CPT | Mod: 26

## 2019-12-13 PROCEDURE — 36415 COLL VENOUS BLD VENIPUNCTURE: CPT

## 2019-12-13 PROCEDURE — 71250 CT THORAX DX C-: CPT

## 2019-12-13 PROCEDURE — 94760 N-INVAS EAR/PLS OXIMETRY 1: CPT

## 2019-12-13 PROCEDURE — 86706 HEP B SURFACE ANTIBODY: CPT

## 2019-12-13 PROCEDURE — 86803 HEPATITIS C AB TEST: CPT

## 2019-12-13 PROCEDURE — 87086 URINE CULTURE/COLONY COUNT: CPT

## 2019-12-13 PROCEDURE — 85379 FIBRIN DEGRADATION QUANT: CPT

## 2019-12-13 PROCEDURE — 87040 BLOOD CULTURE FOR BACTERIA: CPT

## 2019-12-13 PROCEDURE — 85027 COMPLETE CBC AUTOMATED: CPT

## 2019-12-13 PROCEDURE — 99285 EMERGENCY DEPT VISIT HI MDM: CPT | Mod: 25

## 2019-12-13 PROCEDURE — 85730 THROMBOPLASTIN TIME PARTIAL: CPT

## 2019-12-13 PROCEDURE — 93005 ELECTROCARDIOGRAM TRACING: CPT

## 2019-12-13 PROCEDURE — 80048 BASIC METABOLIC PNL TOTAL CA: CPT

## 2019-12-13 PROCEDURE — 80053 COMPREHEN METABOLIC PANEL: CPT

## 2019-12-13 PROCEDURE — 71045 X-RAY EXAM CHEST 1 VIEW: CPT

## 2019-12-13 PROCEDURE — 94640 AIRWAY INHALATION TREATMENT: CPT

## 2019-12-13 PROCEDURE — 93010 ELECTROCARDIOGRAM REPORT: CPT

## 2019-12-13 PROCEDURE — 87631 RESP VIRUS 3-5 TARGETS: CPT

## 2019-12-13 PROCEDURE — 84145 PROCALCITONIN (PCT): CPT

## 2019-12-13 PROCEDURE — 70450 CT HEAD/BRAIN W/O DYE: CPT

## 2019-12-13 PROCEDURE — 82553 CREATINE MB FRACTION: CPT

## 2019-12-13 PROCEDURE — 99261: CPT

## 2019-12-13 PROCEDURE — 86704 HEP B CORE ANTIBODY TOTAL: CPT

## 2019-12-13 PROCEDURE — 85610 PROTHROMBIN TIME: CPT

## 2019-12-13 PROCEDURE — 87340 HEPATITIS B SURFACE AG IA: CPT

## 2019-12-13 PROCEDURE — 83735 ASSAY OF MAGNESIUM: CPT

## 2019-12-13 PROCEDURE — 83036 HEMOGLOBIN GLYCOSYLATED A1C: CPT

## 2019-12-13 PROCEDURE — 84100 ASSAY OF PHOSPHORUS: CPT

## 2019-12-13 PROCEDURE — 99152 MOD SED SAME PHYS/QHP 5/>YRS: CPT

## 2019-12-13 PROCEDURE — 99223 1ST HOSP IP/OBS HIGH 75: CPT

## 2019-12-13 PROCEDURE — 84484 ASSAY OF TROPONIN QUANT: CPT

## 2019-12-13 PROCEDURE — 81001 URINALYSIS AUTO W/SCOPE: CPT

## 2019-12-13 PROCEDURE — 82962 GLUCOSE BLOOD TEST: CPT

## 2019-12-13 PROCEDURE — 93306 TTE W/DOPPLER COMPLETE: CPT

## 2019-12-13 PROCEDURE — 82550 ASSAY OF CK (CPK): CPT

## 2019-12-13 RX ORDER — PANTOPRAZOLE SODIUM 20 MG/1
40 TABLET, DELAYED RELEASE ORAL
Refills: 0 | Status: DISCONTINUED | OUTPATIENT
Start: 2019-12-13 | End: 2019-12-16

## 2019-12-13 RX ORDER — IPRATROPIUM/ALBUTEROL SULFATE 18-103MCG
3 AEROSOL WITH ADAPTER (GRAM) INHALATION
Qty: 0 | Refills: 0 | DISCHARGE
Start: 2019-12-13

## 2019-12-13 RX ORDER — GLUCAGON INJECTION, SOLUTION 0.5 MG/.1ML
1 INJECTION, SOLUTION SUBCUTANEOUS ONCE
Refills: 0 | Status: DISCONTINUED | OUTPATIENT
Start: 2019-12-13 | End: 2019-12-16

## 2019-12-13 RX ORDER — METOPROLOL TARTRATE 50 MG
100 TABLET ORAL DAILY
Refills: 0 | Status: DISCONTINUED | OUTPATIENT
Start: 2019-12-13 | End: 2019-12-16

## 2019-12-13 RX ORDER — INSULIN LISPRO 100/ML
6 VIAL (ML) SUBCUTANEOUS
Refills: 0 | Status: DISCONTINUED | OUTPATIENT
Start: 2019-12-13 | End: 2019-12-16

## 2019-12-13 RX ORDER — INSULIN LISPRO 100/ML
VIAL (ML) SUBCUTANEOUS
Refills: 0 | Status: DISCONTINUED | OUTPATIENT
Start: 2019-12-13 | End: 2019-12-13

## 2019-12-13 RX ORDER — DEXTROSE 50 % IN WATER 50 %
15 SYRINGE (ML) INTRAVENOUS ONCE
Refills: 0 | Status: DISCONTINUED | OUTPATIENT
Start: 2019-12-13 | End: 2019-12-16

## 2019-12-13 RX ORDER — DEXTROSE 50 % IN WATER 50 %
25 SYRINGE (ML) INTRAVENOUS ONCE
Refills: 0 | Status: DISCONTINUED | OUTPATIENT
Start: 2019-12-13 | End: 2019-12-16

## 2019-12-13 RX ORDER — CLOPIDOGREL BISULFATE 75 MG/1
1 TABLET, FILM COATED ORAL
Qty: 0 | Refills: 0 | DISCHARGE

## 2019-12-13 RX ORDER — AMLODIPINE BESYLATE 2.5 MG/1
5 TABLET ORAL DAILY
Refills: 0 | Status: DISCONTINUED | OUTPATIENT
Start: 2019-12-13 | End: 2019-12-13

## 2019-12-13 RX ORDER — INSULIN LISPRO 100/ML
VIAL (ML) SUBCUTANEOUS AT BEDTIME
Refills: 0 | Status: DISCONTINUED | OUTPATIENT
Start: 2019-12-13 | End: 2019-12-14

## 2019-12-13 RX ORDER — CLOPIDOGREL BISULFATE 75 MG/1
75 TABLET, FILM COATED ORAL DAILY
Refills: 0 | Status: DISCONTINUED | OUTPATIENT
Start: 2019-12-13 | End: 2019-12-16

## 2019-12-13 RX ORDER — INSULIN LISPRO 100/ML
4 VIAL (ML) SUBCUTANEOUS ONCE
Refills: 0 | Status: COMPLETED | OUTPATIENT
Start: 2019-12-13 | End: 2019-12-13

## 2019-12-13 RX ORDER — SODIUM CHLORIDE 9 MG/ML
1000 INJECTION, SOLUTION INTRAVENOUS
Refills: 0 | Status: DISCONTINUED | OUTPATIENT
Start: 2019-12-13 | End: 2019-12-16

## 2019-12-13 RX ORDER — ERYTHROPOIETIN 10000 [IU]/ML
0 INJECTION, SOLUTION INTRAVENOUS; SUBCUTANEOUS
Qty: 0 | Refills: 0 | DISCHARGE
Start: 2019-12-13

## 2019-12-13 RX ORDER — AMLODIPINE BESYLATE 2.5 MG/1
1.5 TABLET ORAL
Qty: 0 | Refills: 0 | DISCHARGE

## 2019-12-13 RX ORDER — ATORVASTATIN CALCIUM 80 MG/1
1 TABLET, FILM COATED ORAL
Qty: 0 | Refills: 0 | DISCHARGE
Start: 2019-12-13

## 2019-12-13 RX ORDER — METOPROLOL TARTRATE 50 MG
1 TABLET ORAL
Qty: 0 | Refills: 0 | DISCHARGE
Start: 2019-12-13

## 2019-12-13 RX ORDER — ASPIRIN/CALCIUM CARB/MAGNESIUM 324 MG
1 TABLET ORAL
Qty: 0 | Refills: 0 | DISCHARGE
Start: 2019-12-13

## 2019-12-13 RX ORDER — METOPROLOL TARTRATE 50 MG
1 TABLET ORAL
Qty: 0 | Refills: 0 | DISCHARGE

## 2019-12-13 RX ORDER — ZOLPIDEM TARTRATE 10 MG/1
1 TABLET ORAL
Qty: 0 | Refills: 0 | DISCHARGE

## 2019-12-13 RX ORDER — INSULIN LISPRO 100/ML
VIAL (ML) SUBCUTANEOUS AT BEDTIME
Refills: 0 | Status: DISCONTINUED | OUTPATIENT
Start: 2019-12-13 | End: 2019-12-13

## 2019-12-13 RX ORDER — HEPARIN SODIUM 5000 [USP'U]/ML
0 INJECTION INTRAVENOUS; SUBCUTANEOUS
Qty: 0 | Refills: 0 | DISCHARGE
Start: 2019-12-13

## 2019-12-13 RX ORDER — INSULIN LISPRO 100/ML
VIAL (ML) SUBCUTANEOUS
Refills: 0 | Status: DISCONTINUED | OUTPATIENT
Start: 2019-12-13 | End: 2019-12-14

## 2019-12-13 RX ORDER — INSULIN GLARGINE 100 [IU]/ML
12 INJECTION, SOLUTION SUBCUTANEOUS AT BEDTIME
Refills: 0 | Status: DISCONTINUED | OUTPATIENT
Start: 2019-12-13 | End: 2019-12-14

## 2019-12-13 RX ORDER — ERYTHROPOIETIN 10000 [IU]/ML
10000 INJECTION, SOLUTION INTRAVENOUS; SUBCUTANEOUS ONCE
Refills: 0 | Status: COMPLETED | OUTPATIENT
Start: 2019-12-14 | End: 2019-12-14

## 2019-12-13 RX ORDER — INSULIN GLARGINE 100 [IU]/ML
18 INJECTION, SOLUTION SUBCUTANEOUS AT BEDTIME
Refills: 0 | Status: DISCONTINUED | OUTPATIENT
Start: 2019-12-13 | End: 2019-12-14

## 2019-12-13 RX ORDER — AMLODIPINE BESYLATE 2.5 MG/1
7.5 TABLET ORAL DAILY
Refills: 0 | Status: DISCONTINUED | OUTPATIENT
Start: 2019-12-13 | End: 2019-12-16

## 2019-12-13 RX ORDER — ASPIRIN/CALCIUM CARB/MAGNESIUM 324 MG
1 TABLET ORAL
Qty: 0 | Refills: 0 | DISCHARGE

## 2019-12-13 RX ORDER — INSULIN GLARGINE 100 [IU]/ML
15 INJECTION, SOLUTION SUBCUTANEOUS EVERY MORNING
Refills: 0 | Status: DISCONTINUED | OUTPATIENT
Start: 2019-12-13 | End: 2019-12-13

## 2019-12-13 RX ORDER — ACETAMINOPHEN 500 MG
2 TABLET ORAL
Qty: 0 | Refills: 0 | DISCHARGE
Start: 2019-12-13

## 2019-12-13 RX ORDER — DEXTROSE 50 % IN WATER 50 %
12.5 SYRINGE (ML) INTRAVENOUS ONCE
Refills: 0 | Status: DISCONTINUED | OUTPATIENT
Start: 2019-12-13 | End: 2019-12-16

## 2019-12-13 RX ORDER — ATORVASTATIN CALCIUM 80 MG/1
1 TABLET, FILM COATED ORAL
Qty: 0 | Refills: 0 | DISCHARGE

## 2019-12-13 RX ORDER — INSULIN GLARGINE 100 [IU]/ML
8 INJECTION, SOLUTION SUBCUTANEOUS EVERY MORNING
Refills: 0 | Status: DISCONTINUED | OUTPATIENT
Start: 2019-12-13 | End: 2019-12-14

## 2019-12-13 RX ORDER — CLOPIDOGREL BISULFATE 75 MG/1
1 TABLET, FILM COATED ORAL
Qty: 0 | Refills: 0 | DISCHARGE
Start: 2019-12-13

## 2019-12-13 RX ORDER — ASPIRIN/CALCIUM CARB/MAGNESIUM 324 MG
81 TABLET ORAL DAILY
Refills: 0 | Status: DISCONTINUED | OUTPATIENT
Start: 2019-12-13 | End: 2019-12-16

## 2019-12-13 RX ORDER — ATORVASTATIN CALCIUM 80 MG/1
40 TABLET, FILM COATED ORAL AT BEDTIME
Refills: 0 | Status: DISCONTINUED | OUTPATIENT
Start: 2019-12-13 | End: 2019-12-16

## 2019-12-13 RX ORDER — INSULIN LISPRO 100/ML
0 VIAL (ML) SUBCUTANEOUS
Qty: 0 | Refills: 0 | DISCHARGE
Start: 2019-12-13

## 2019-12-13 RX ADMIN — Medication 650 MILLIGRAM(S): at 05:01

## 2019-12-13 RX ADMIN — HEPARIN SODIUM 900 UNIT(S)/HR: 5000 INJECTION INTRAVENOUS; SUBCUTANEOUS at 07:53

## 2019-12-13 RX ADMIN — Medication 4 UNIT(S): at 12:27

## 2019-12-13 RX ADMIN — ATORVASTATIN CALCIUM 40 MILLIGRAM(S): 80 TABLET, FILM COATED ORAL at 21:15

## 2019-12-13 RX ADMIN — INSULIN GLARGINE 12 UNIT(S): 100 INJECTION, SOLUTION SUBCUTANEOUS at 22:03

## 2019-12-13 RX ADMIN — Medication 4: at 18:38

## 2019-12-13 RX ADMIN — Medication 6: at 08:15

## 2019-12-13 RX ADMIN — INSULIN GLARGINE 15 UNIT(S): 100 INJECTION, SOLUTION SUBCUTANEOUS at 09:06

## 2019-12-13 RX ADMIN — Medication 0.1 MILLIGRAM(S): at 21:16

## 2019-12-13 RX ADMIN — Medication 2: at 22:04

## 2019-12-13 RX ADMIN — Medication 100 MILLIGRAM(S): at 05:01

## 2019-12-13 RX ADMIN — Medication 3 UNIT(S): at 08:15

## 2019-12-13 RX ADMIN — Medication 3 MILLILITER(S): at 07:41

## 2019-12-13 NOTE — CONSULT NOTE ADULT - ASSESSMENT
68yo  female with PMH Hepatitis A, HTN, HLD, MI, CAD s/p CANDE x 2 at [facility], moderate MR, PAD, depression/anxiety, DM2 A1C 9.0, ESRD on HD who presented to [facility] 12/12/19 with complaints of chest tightness/throat tightness and SOB at rest that lasted 4 hours prior to seeking medical attention.  Pt found to have NSTEMI, transferred here for cath, s/p cath, feels well.   Overall leucocytosis, abnormal U/A, SIRS, tachycardia.       Plan:   Leucocytosis likely reactive from ACS.   Abnormal U/A but pt not symptomatic. Likely represents asymptomatic pyuria.  Blood cx NTD  Monitor off abx.   CT chest with no pneumonia.   No obvious source of infection at this time. 70yo  female with PMH Hepatitis A, HTN, HLD, MI, CAD s/p CANDE x 2 at [facility], moderate MR, PAD, depression/anxiety, DM2 A1C 9.0, ESRD on HD who presented to [facility] 12/12/19 with complaints of chest tightness/throat tightness and SOB at rest that lasted 4 hours prior to seeking medical attention.  Pt found to have NSTEMI, transferred here for cath, s/p cath, feels well.   Overall leucocytosis, abnormal U/A, SIRS, tachycardia. Fever  Pt had isolated fever at OSH. No abdominal pain, no diarrhea.       Plan:   Leucocytosis likely reactive from ACS.   Abnormal U/A but pt not symptomatic. Likely represents asymptomatic pyuria.  Blood cx NTD  Monitor off abx.   CT chest with no pneumonia.   No obvious source of infection at this time.

## 2019-12-13 NOTE — PROGRESS NOTE ADULT - SUBJECTIVE AND OBJECTIVE BOX
Patient is a 69y Female with a known history of :  Dyspnea (R06.00)  ESRD (end stage renal disease) (N18.6)  Prophylactic measure (Z29.9)  Depression (F32.9)  DM (diabetes mellitus) (E11.9)  HTN (hypertension) (I10)  Pulmonary edema (J81.1)  ESRD (end stage renal disease) on dialysis (N18.6)  ACS (acute coronary syndrome) (I24.9)    HPI:  70yo female with hx of  CAD (coronary artery disease)  ,CRF (chronic renal failure)  ,Depression  ,Diabetes mellitus II  , ESRD , Dialysis patient    h/o Anxiety attack  ,h/o Hepatitis A 1969 ,h/o Myocardial infarct 2007  ,h/o Smoking  quitted 3/2012 ,HTN (hypertension)  ,Murmur, cardiac  ,PAD (peripheral artery disease) bib EMS  with chest pain . Patient  states it started last night  with tightness and sob, diffuse, non radiating, no cough, fever, chills, no diaphoresis CTT demonstrated pulmonary edema ,found to have LARISA elevation Cardiology and nephrology consults requested by ER Admitted  to telemetry unit for monitoring , send 3 sets of cardiac ensymes to rule out coronary event, obtain ECHO to evaluate LVEF, cardiology consult ,continue current management, O2 supply, anticoagulation plan as per cardiology consult Dr ALBRIGHT .Case d/w MD on call Dr Perez ,patient will be scheduled for HD session , obtain echo to evaluate LVEF, intravenous diuresis, monitor ins/outs, monitor renal profile and electrolytes closely, cardiology consult ,send 3 sets of ensymes, O2 supply, serial chest xrays, monitor weights and oral intake of fluids, nutritionist consult Palliative care consult requested ,to discuss advance directives and complete MOLST (12 Dec 2019 06:58)      REVIEW OF SYSTEMS:    CONSTITUTIONAL: No fever, weight loss, or fatigue  EYES: No eye pain, visual disturbances, or discharge  ENMT:  No difficulty hearing, tinnitus, vertigo; No sinus or throat pain  NECK: No pain or stiffness  BREASTS: No pain, masses, or nipple discharge  RESPIRATORY: No cough, wheezing, chills or hemoptysis; No shortness of breath  CARDIOVASCULAR: No chest pain, palpitations, dizziness, or leg swelling  GASTROINTESTINAL: No abdominal or epigastric pain. No nausea, vomiting, or hematemesis; No diarrhea or constipation. No melena or hematochezia.  GENITOURINARY: No dysuria, frequency, hematuria, or incontinence  NEUROLOGICAL: No headaches, memory loss, loss of strength, numbness, or tremors  SKIN: No itching, burning, rashes, or lesions   LYMPH NODES: No enlarged glands  ENDOCRINE: No heat or cold intolerance; No hair loss  MUSCULOSKELETAL: No joint pain or swelling; No muscle, back, or extremity pain  PSYCHIATRIC: No depression, anxiety, mood swings, or difficulty sleeping  HEME/LYMPH: No easy bruising, or bleeding gums  ALLERGY AND IMMUNOLOGIC: No hives or eczema    MEDICATIONS  (STANDING):  albuterol/ipratropium for Nebulization 3 milliLiter(s) Nebulizer every 6 hours  aspirin enteric coated 81 milliGRAM(s) Oral daily  atorvastatin 40 milliGRAM(s) Oral at bedtime  cloNIDine 0.1 milliGRAM(s) Oral at bedtime  clopidogrel Tablet 75 milliGRAM(s) Oral daily  dextrose 5%. 1000 milliLiter(s) (50 mL/Hr) IV Continuous <Continuous>  dextrose 50% Injectable 12.5 Gram(s) IV Push once  dextrose 50% Injectable 25 Gram(s) IV Push once  dextrose 50% Injectable 25 Gram(s) IV Push once  epoetin fawad Injectable 30379 Unit(s) IV Push <User Schedule>  heparin  Infusion.  Unit(s)/Hr (9 mL/Hr) IV Continuous <Continuous>  imipramine 50 milliGRAM(s) Oral daily  insulin glargine Injectable (LANTUS) 10 Unit(s) SubCutaneous every morning  insulin lispro (HumaLOG) corrective regimen sliding scale   SubCutaneous three times a day before meals  insulin lispro (HumaLOG) corrective regimen sliding scale   SubCutaneous at bedtime  insulin lispro Injectable (HumaLOG) 3 Unit(s) SubCutaneous three times a day before meals  metoprolol succinate  milliGRAM(s) Oral daily  multivitamin 1 Tablet(s) Oral daily    MEDICATIONS  (PRN):  acetaminophen   Tablet .. 650 milliGRAM(s) Oral every 6 hours PRN Temp greater or equal to 38C (100.4F), Mild Pain (1 - 3)  dextrose 40% Gel 15 Gram(s) Oral once PRN Blood Glucose LESS THAN 70 milliGRAM(s)/deciliter  glucagon  Injectable 1 milliGRAM(s) IntraMuscular once PRN Glucose LESS THAN 70 milligrams/deciliter  heparin  Injectable 4400 Unit(s) IV Push every 6 hours PRN For aPTT less than 40      ALLERGIES: latex (Unknown)  No Known Drug Allergies      FAMILY HISTORY:  No pertinent family history in first degree relatives      PHYSICAL EXAMINATION:  -----------------------------  T(C): 37.4 (12-13-19 @ 06:54), Max: 38.3 (12-13-19 @ 04:24)  HR: 104 (12-13-19 @ 04:24) (71 - 108)  BP: 134/77 (12-13-19 @ 04:24) (134/77 - 170/89)  RR: 18 (12-13-19 @ 04:24) (12 - 19)  SpO2: 94% (12-13-19 @ 04:24) (91% - 100%)  Wt(kg): --    12-12 @ 07:01  -  12-13 @ 07:00  --------------------------------------------------------  IN:    heparin  Infusion.: 135 mL    Oral Fluid: 300 mL  Total IN: 435 mL    OUT:    Other: 800 mL  Total OUT: 800 mL    Total NET: -365 mL            Constitutional: well developed, normal appearance, well groomed, well nourished, no deformities and no acute distress.   Eyes: the conjunctiva exhibited no abnormalities and the eyelids demonstrated no xanthelasmas.   HEENT: normal oral mucosa, no oral pallor and no oral cyanosis.   Neck: normal jugular venous A waves present, normal jugular venous V waves present and no jugular venous wilson A waves.   Pulmonary: no respiratory distress, normal respiratory rhythm and effort, no accessory muscle use and lungs were clear to auscultation bilaterally.   Cardiovascular: heart rate and rhythm were normal, normal S1 and S2 and no murmur, gallop, rub, heave or thrill are present.   Abdomen: soft, non-tender, no hepato-splenomegaly and no abdominal mass palpated.   Musculoskeletal: the gait could not be assessed..   Extremities: no clubbing of the fingernails, no localized cyanosis, no petechial hemorrhages and no ischemic changes.   Skin: normal skin color and pigmentation, no rash, no venous stasis, no skin lesions, no skin ulcer and no xanthoma was observed.   Psychiatric: oriented to person, place, and time, the affect was normal, the mood was normal and not feeling anxious.     LABS:   --------  12-12    135  |  97  |  33<H>  ----------------------------<  252<H>  4.0   |  28  |  4.10<H>    Ca    9.1      12 Dec 2019 16:59  Phos  3.4     12-12  Mg     1.9     12-12    TPro  7.5  /  Alb  3.8  /  TBili  0.4  /  DBili  x   /  AST  18  /  ALT  21  /  AlkPhos  102  12-12                         9.7    15.63 )-----------( 255      ( 13 Dec 2019 07:11 )             29.5     PT/INR - ( 12 Dec 2019 07:14 )   PT: 10.9 sec;   INR: 0.97 ratio         PTT - ( 12 Dec 2019 21:27 )  PTT:57.9 sec    12-12 @ 14:41 CPK total:--, CKMB --, Troponin I - 15.600 ng/mL<H>  12-12 @ 06:15 CPK total:--, CKMB --, Troponin I - 12.800 ng/mL<H>  12-12 @ 00:06 CPK total:--, CKMB --, Troponin I - .132 ng/mL<H>          RADIOLOGY:  -----------------        ECG:

## 2019-12-13 NOTE — DISCHARGE NOTE PROVIDER - NSDCCPCAREPLAN_GEN_ALL_CORE_FT
PRINCIPAL DISCHARGE DIAGNOSIS  Diagnosis: ACS (acute coronary syndrome)  Assessment and Plan of Treatment:       SECONDARY DISCHARGE DIAGNOSES  Diagnosis: Pulmonary edema  Assessment and Plan of Treatment: Pulmonary edema    Diagnosis: HTN (hypertension)  Assessment and Plan of Treatment: HTN (hypertension)    Diagnosis: ESRD (end stage renal disease)  Assessment and Plan of Treatment: ESRD (end stage renal disease)    Diagnosis: DM (diabetes mellitus)  Assessment and Plan of Treatment: DM (diabetes mellitus)    Diagnosis: Chest pain at rest  Assessment and Plan of Treatment:     Diagnosis: Depression  Assessment and Plan of Treatment: Depression    Diagnosis: Dyspnea  Assessment and Plan of Treatment:

## 2019-12-13 NOTE — DISCHARGE NOTE PROVIDER - CARE PROVIDER_API CALL
Joseluis James)  Internal Medicine  [address] [facility], [address] [place] [address]  Phone: [phone]  Fax: [phone]  Follow Up Time: 1 week

## 2019-12-13 NOTE — PROGRESS NOTE ADULT - SUBJECTIVE AND OBJECTIVE BOX
SHILO KOHLER    PLV TELE 311 W1    Patient is a 69y old  Female who presents with a chief complaint of CHEST PAIN AND DYSPNEA (13 Dec 2019 08:57)       Allergies    latex (Unknown)  No Known Drug Allergies    Intolerances        HPI:  68yo female with hx of  CAD (coronary artery disease)  ,CRF (chronic renal failure)  ,Depression  ,Diabetes mellitus II  , ESRD , Dialysis patient    h/o Anxiety attack  ,h/o Hepatitis A  ,h/o Myocardial infarct   ,h/o Smoking  quitted 3/2012 ,HTN (hypertension)  ,Murmur, cardiac  ,PAD (peripheral artery disease) bib EMS  with chest pain . Patient  states it started last night  with tightness and sob, diffuse, non radiating, no cough, fever, chills, no diaphoresis CTT demonstrated pulmonary edema ,found to have LARISA elevation Cardiology and nephrology consults requested by ER Admitted  to telemetry unit for monitoring , send 3 sets of cardiac ensymes to rule out coronary event, obtain ECHO to evaluate LVEF, cardiology consult ,continue current management, O2 supply, anticoagulation plan as per cardiology consult Dr ALBRIGHT .Case d/w MD on call Dr Perez ,patient will be scheduled for HD session , obtain echo to evaluate LVEF, intravenous diuresis, monitor ins/outs, monitor renal profile and electrolytes closely, cardiology consult ,send 3 sets of ensymes, O2 supply, serial chest xrays, monitor weights and oral intake of fluids, nutritionist consult Palliative care consult requested ,to discuss advance directives and complete MOLST (12 Dec 2019 06:58)      PAST MEDICAL & SURGICAL HISTORY:  Dialysis patient  CRF (chronic renal failure), unspecified stage  h/o Smoking: quitted 3/2012  Murmur, cardiac  PAD (peripheral artery disease)  h/o Hepatitis A : currently resolved, no symptoms  CAD (coronary artery disease)  h/o Myocardial infarct   Depression  h/o Anxiety attack  HTN (hypertension)  Diabetes mellitus II  s/p surgical removal of benign Skin lesion epigastric area  s/p Ovarian cyst removal  coronary stent       FAMILY HISTORY:  No pertinent family history in first degree relatives        MEDICATIONS   acetaminophen   Tablet .. 650 milliGRAM(s) Oral every 6 hours PRN  albuterol/ipratropium for Nebulization 3 milliLiter(s) Nebulizer every 6 hours  aspirin enteric coated 81 milliGRAM(s) Oral daily  atorvastatin 40 milliGRAM(s) Oral at bedtime  cloNIDine 0.1 milliGRAM(s) Oral at bedtime  clopidogrel Tablet 75 milliGRAM(s) Oral daily  dextrose 40% Gel 15 Gram(s) Oral once PRN  dextrose 5%. 1000 milliLiter(s) IV Continuous <Continuous>  dextrose 50% Injectable 12.5 Gram(s) IV Push once  dextrose 50% Injectable 25 Gram(s) IV Push once  dextrose 50% Injectable 25 Gram(s) IV Push once  epoetin fawad Injectable 01640 Unit(s) IV Push <User Schedule>  glucagon  Injectable 1 milliGRAM(s) IntraMuscular once PRN  heparin  Infusion.  Unit(s)/Hr IV Continuous <Continuous>  heparin  Injectable 4400 Unit(s) IV Push every 6 hours PRN  imipramine 50 milliGRAM(s) Oral daily  insulin glargine Injectable (LANTUS) 15 Unit(s) SubCutaneous every morning  insulin lispro (HumaLOG) corrective regimen sliding scale   SubCutaneous three times a day before meals  insulin lispro (HumaLOG) corrective regimen sliding scale   SubCutaneous at bedtime  insulin lispro Injectable (HumaLOG) 3 Unit(s) SubCutaneous three times a day before meals  metoprolol succinate  milliGRAM(s) Oral daily  multivitamin 1 Tablet(s) Oral daily      Vital Signs Last 24 Hrs  T(C): 36.7 (13 Dec 2019 10:30), Max: 38.3 (13 Dec 2019 04:24)  T(F): 98.1 (13 Dec 2019 10:30), Max: 101 (13 Dec 2019 04:24)  HR: 97 (13 Dec 2019 10:30) (88 - 108)  BP: 143/67 (13 Dec 2019 10:30) (131/76 - 170/89)  BP(mean): --  RR: 16 (13 Dec 2019 10:30) (16 - 19)  SpO2: 94% (13 Dec 2019 10:30) (91% - 96%)      19 @ 07:01  -  19 @ 07:00  --------------------------------------------------------  IN: 435 mL / OUT: 800 mL / NET: -365 mL    19 @ 07:01  -  19 @ 11:58  --------------------------------------------------------  IN: 127 mL / OUT: 0 mL / NET: 127 mL            LABS:                        9.7    15.63 )-----------( 255      ( 13 Dec 2019 07:11 )             29.5         134<L>  |  95<L>  |  49<H>  ----------------------------<  306<H>  4.6   |  26  |  6.00<H>    Ca    9.3      13 Dec 2019 07:11  Phos  3.4       Mg     1.9         TPro  7.5  /  Alb  3.8  /  TBili  0.4  /  DBili  x   /  AST  18  /  ALT  21  /  AlkPhos  102      PT/INR - ( 12 Dec 2019 07:14 )   PT: 10.9 sec;   INR: 0.97 ratio         PTT - ( 13 Dec 2019 07:11 )  PTT:53.6 sec  Urinalysis Basic - ( 13 Dec 2019 08:09 )    Color: Yellow / Appearance: Slightly Turbid / S.015 / pH: x  Gluc: x / Ketone: Small  / Bili: Negative / Urobili: Negative   Blood: x / Protein: 500 mg/dL / Nitrite: Negative   Leuk Esterase: Moderate / RBC: 0-2 /HPF / WBC 11-25   Sq Epi: x / Non Sq Epi: Few / Bacteria: Few            WBC:  WBC Count: 15.63 K/uL ( @ 07:11)  WBC Count: 15.97 K/uL ( @ 16:59)  WBC Count: 16.86 K/uL ( @ 00:06)      MICROBIOLOGY:  RECENT CULTURES:        CARDIAC MARKERS ( 12 Dec 2019 14:41 )  15.600 ng/mL / x     / x     / x     / x      CARDIAC MARKERS ( 12 Dec 2019 06:15 )  12.800 ng/mL / x     / 283 U/L / x     / 19.7 ng/mL  CARDIAC MARKERS ( 12 Dec 2019 00:06 )  .132 ng/mL / x     / 73 U/L / x     / x            PT/INR - ( 12 Dec 2019 07:14 )   PT: 10.9 sec;   INR: 0.97 ratio         PTT - ( 13 Dec 2019 07:11 )  PTT:53.6 sec    Sodium:  Sodium, Serum: 134 mmol/L ( 07:11)  Sodium, Serum: 135 mmol/L ( 16:59)  Sodium, Serum: 135 mmol/L ( 00:06)      6.00 mg/dL  07:11  4.10 mg/dL  16:59  5.90 mg/dL  @ 00:06      Hemoglobin:  Hemoglobin: 9.7 g/dL ( 07:11)  Hemoglobin: 10.2 g/dL ( 16:59)  Hemoglobin: 10.5 g/dL ( @ 00:06)      Platelets: Platelet Count - Automated: 255 K/uL ( @ 07:11)  Platelet Count - Automated: 283 K/uL ( @ 16:59)  Platelet Count - Automated: 254 K/uL ( @ 00:06)      LIVER FUNCTIONS - ( 12 Dec 2019 00:06 )  Alb: 3.8 g/dL / Pro: 7.5 g/dL / ALK PHOS: 102 U/L / ALT: 21 U/L / AST: 18 U/L / GGT: x             Urinalysis Basic - ( 13 Dec 2019 08:09 )    Color: Yellow / Appearance: Slightly Turbid / S.015 / pH: x  Gluc: x / Ketone: Small  / Bili: Negative / Urobili: Negative   Blood: x / Protein: 500 mg/dL / Nitrite: Negative   Leuk Esterase: Moderate / RBC: 0-2 /HPF / WBC 11-25   Sq Epi: x / Non Sq Epi: Few / Bacteria: Few        RADIOLOGY & ADDITIONAL STUDIES:

## 2019-12-13 NOTE — DISCHARGE NOTE NURSING/CASE MANAGEMENT/SOCIAL WORK - PATIENT PORTAL LINK FT
You can access the FollowMyHealth Patient Portal offered by [facility] by registering at the following website: [email or web address]. By joining [employer]’s FollowMyHealth portal, you will also be able to view your health information using other applications (apps) compatible with our system.

## 2019-12-13 NOTE — PROGRESS NOTE ADULT - ASSESSMENT
cont rx cont rx  REVIEW OF SYMPTOMS      Able to give ROS  Yes     RELIABLE No   CONSTITUTIONAL Weakness Yes  Chills No Vision changes No  ENDOCRINE No unexplained hair loss No heat or cold intolerance    ALLERGY No hives  Sore throat No   RESP Coughing blood no  Shortness of breath YES   NEURO No Headache  Confusion Pain neck No   CARDIAC No Chest pain No Palpitations   GI No Pain abdomen NO   Vomiting NO     PHYSICAL EXAM    HEENT Unremarkable PERRLA atraumatic   RESP Fair air entry EXP prolonged    Harsh breath sound Resp distres mild   CARDIAC S1 S2 No S3     NO JVD    ABDOMEN SOFT BS PRESENT NOT DISTENDED No hepatosplenomegaly PEDAL EDEMA present No calf tenderness  NO rash   GENERAL Not TOXIC looking    EVENTS/VITALS/LABS       2019 101 104 130/70   2019 W 15.6 Hb 9.7 Plt 255     JOSE F SHILO [facility] [place] P [phone]   1950 DOA 2019 DR HARRY PRESTON   ALLERGY   latex      CONTACT        Sp Geoffrey R         PT DATA/BEST PRACTICE  ADVANCED DIRECTIVE       CODE STATUS: [ ] full code  [x ] DNR  [ ] DNI  [ ] MOLST  Goals of care discussion: [ ] yes   WT    71            BMI    23                                                                                                       HEAD OF BED ELEVATION Yes  DIET matthews renal ()   IV F          DVT PROPHYLAXIS    iv ufh () (MI)               JOSE F SHILO [facility] [place] P [phone]   1950 DOA 2019 DR HARRY PRESTON   ALLERGY   latex      CONTACT        Sp Geoffrey R         PT DATA/BEST PRACTICE  ADVANCED DIRECTIVE       CODE STATUS: [ ] full code  [x ] DNR  [ ] DNI  [ ] MOLST  Goals of care discussion: [ ] yes   WT    69            BMI    23                                                                                                       HEAD OF BED ELEVATION Yes  DIET matthews renal ()   IV F          DVT PROPHYLAXIS    iv ufh () (MI)               PULMONARY/CRITICAL CARE ASSESSMENT/RECOMMENDATIONS                               INFECTION   No fever chills   W -2019 W 16.8 -15.6   P 2019 PC .66   X 12/12/2019 CXR cm congestive changes Patchy airspace disesp r base   M 2019 Flu AB n RSV n   2019 Clinically infection unlikely Agree with deferring abio  If fever will start abio    COPD   duoneb ()   Cont Rx    MI   2019 Tr 1 12.8  2019   2019 MB 19   MEDS ASA 81 () plavix 75 ()  IV ufh () atorvastat 40 () metoprolol 100 ()   Cath planned for     ANEMIA norm   2019 Hb 10.5   Monitor serially    DM  Lantus 10 ()     ESRD   HD as per Dr Perez        PLAN  Ac MI on iv ufh bb dapt for cath    O2 to keep po 92-95      TIME SPENT Over 25 minutes aggregate care time spent on encounter; activities included   direct pt care, counseling and/or coordinating care reviewing notes, lab data/ imaging , discussion with multidisciplinary team/ pt /family. Risks, benefits, alternatives  discussed in detail.

## 2019-12-13 NOTE — H&P CARDIOLOGY - NEUROLOGICAL
not applicable negative Alert & oriented; no sensory, motor or coordination deficits, normal reflexes

## 2019-12-13 NOTE — H&P CARDIOLOGY - HISTORY OF PRESENT ILLNESS
68yo  female with PMH Hepatitis A, HTN, HLD, MI, CAD s/p CANDE x 2 at [facility], "childhood murmur,"  PAD, depression/anxiety, DM2 A1C 9.0, ESRD on HD for past 1.5 years via AV fistula last treatment 12/12/19 managed by Dr. Perez at [facility], former smoker 100 pack years who presented to [facility] 12/12/19 with complaints of chest tightness/throat tightness and SOB at rest that lasted 4 hours prior to seeking medical attention.  Denied dizziness, diaphoresis, palpitations, nausea, vomiting, peripheral edema, recent weight gain, or syncope. No implanted monitoring deices. Pt. states she has had lightheadedness/dizziness/near syncope at last three episodes of dialysis. Cardiac biomarkers positive 12.8>15.6.  Pt. started on Heparin ACS.  CT chest 12/12/19 pulmonary edema and small bilateral  pleural effusions.  CT head 12/12/19 Moderate cerebral parenchymal volume loss and chronic ischemic white   matter changes. No intra-axial or extra-axial hemorrhage edema, territorial infarct or mass effect. Clear sinuses. Cranium intact. 68yo  female with PMH Hepatitis A, HTN, HLD, MI, CAD s/p CANDE x 2 at [facility], moderate MR, PAD, depression/anxiety, DM2 A1C 9.0, ESRD on HD for past 1.5 years via AV fistula last treatment 12/12/19 managed by Dr. Perez at [facility], former smoker 100 pack years who presented to [facility] 12/12/19 with complaints of chest tightness/throat tightness and SOB at rest that lasted 4 hours prior to seeking medical attention.  Denied dizziness, diaphoresis, palpitations, nausea, vomiting, peripheral edema, recent weight gain, or syncope. No implanted monitoring deices. Pt. states she has had lightheadedness/dizziness/near syncope at last three episodes of dialysis. Cardiac biomarkers positive 12.8>15.6.  Pt. started on Heparin ACS.  CT chest 12/12/19 pulmonary edema and small bilateral  pleural effusions.  CT head 12/12/19 Moderate cerebral parenchymal volume loss and chronic ischemic white matter changes. No intra-axial or extra-axial hemorrhage edema, territorial infarct or mass effect. Clear sinuses. Cranium intact. TTE 5/1/18 EF 30% inferior, posterior and lateral walls are severely hypokinetic to akinetic, basal inferior wall thinned and aneurysmal, apex is hypokinetic, as is the distal anteroseptum.  D dimer 12/12/19 274.  WBC 16.8---15.6.  Pt. transferred asymptomatic for further evaluation and cardiac cath. Pt. with current MOSLT documentation 12/12/19 DNR. 68yo  female with PMH Hepatitis A, HTN, HLD, MI, CAD s/p CANDE x 2 at [facility], moderate MR, PAD, depression/anxiety, DM2 A1C 9.0, ESRD on HD for past 1.5 years via AV fistula last treatment 12/12/19 managed by Dr. Perez at [facility], former smoker 100 pack years who presented to [facility] 12/12/19 with complaints of chest tightness/throat tightness and SOB at rest that lasted 4 hours prior to seeking medical attention.  Denied dizziness, diaphoresis, palpitations, nausea, vomiting, peripheral edema, recent weight gain, or syncope. No implanted monitoring deices. Pt. states she has had lightheadedness/dizziness/near syncope at last three episodes of dialysis. Cardiac biomarkers positive 12.8>15.6.  Pt. started on Heparin ACS.  CT chest 12/12/19 pulmonary edema and small bilateral  pleural effusions.  CT head 12/12/19 Moderate cerebral parenchymal volume loss and chronic ischemic white matter changes. No intra-axial or extra-axial hemorrhage edema, territorial infarct or mass effect. Clear sinuses. Cranium intact. TTE 5/1/18 EF 30% inferior, posterior and lateral walls are severely hypokinetic to akinetic, basal inferior wall thinned and aneurysmal, apex is hypokinetic, as is the distal anteroseptum.  D dimer 12/12/19 274.  WBC 16.8---15.6.  Pt. transferred asymptomatic for further evaluation and cardiac cath. Pt. with current MOSLT documentation 12/12/19 DNR.      Symptoms:        Angina (Class): IV       Ischemic Symptoms: chest pain/pressure, SOB    Heart Failure:        Systolic/Diastolic/Combined: systolic       NYHA Class (within 2 weeks):     Assessment of LVEF:       EF:        Assessed by:        Date:     Prior Cardiac Interventions:       PCI's:        CABG:     Noninvasive Testing:   Stress Test: Date:        Protocol:        Duration of Exercise:        Symptoms:        EKG Changes:        DTS:        Myocardial Imaging:        Risk Assessment:     Echo:     Antianginal Therapies:        Beta Blockers:  Metoprolol       Calcium Channel Blockers: Amlodipine       Long Acting Nitrates:        Ranexa:     Associated Risk Factors:        Cerebrovascular Disease: N/A       Chronic Lung Disease: N/A       Peripheral Arterial Disease: yes       Chronic Kidney Disease (if yes, what is GFR): on HD       Uncontrolled Diabetes (if yes, what is HgbA1C or FBS): A1C 9.0        Poorly Controlled Hypertension (if yes, what is SBP): N/A       Morbid Obesity (if yes, what is BMI): N/A       History of Recent Ventricular Arrhythmia: N/A       Inability to Ambulate Safely: N/A       Need for Therapeutic Anticoagulation: N/A       Antiplatelet or Contrast Allergy: N/A

## 2019-12-13 NOTE — DISCHARGE NOTE PROVIDER - HOSPITAL COURSE
68yo female with hx of  CAD (coronary artery disease)  ,CRF (chronic renal failure)  ,Depression  ,Diabetes mellitus II  , ESRD , Dialysis patient      h/o Anxiety attack  ,h/o Hepatitis A 1969 ,h/o Myocardial infarct 2007  ,h/o Smoking  quitted 3/2012 ,HTN (hypertension)  ,Murmur, cardiac  ,PAD (peripheral artery disease) bib EMS  with chest pain . Patient  states it started last night  with tightness and sob, diffuse, non radiating, no cough, fever, chills, no diaphoresis CTT demonstrated pulmonary edema ,found to have LARISA elevation Cardiology and nephrology consults requested by ER Admitted  to telemetry unit for monitoring , send 3 sets of cardiac ensymes to rule out coronary event, obtain ECHO to evaluate LVEF, cardiology consult ,continue current management, O2 supply, anticoagulation plan as per cardiology consult Dr ALBRIGHT .Case d/w MD on call Dr Perez ,patient will be scheduled for HD session , obtain echo to evaluate LVEF, intravenous diuresis, monitor ins/outs, monitor renal profile and electrolytes closely, cardiology consult ,send 3 sets of ensymes, O2 supply, serial chest xrays, monitor weights and oral intake of fluids, nutritionist consult Palliative care consult requested ,to discuss advance directives and complete MOLST         Problem/Plan - 1:    ·  Problem: ACS (acute coronary syndrome).  Plan: Admitted  to telemetry unit for monitoring , send 3 sets of cardiac ensymes to rule out coronary event, obtain ECHO to evaluate LVEF, cardiology consult ,continue current management, O2 supply, anticoagulation plan as per cardiology consult.         Problem/Plan - 2:    ·  Problem: ESRD (end stage renal disease) on dialysis.  Plan: HD AS PER NEPHROLOGIST.         Problem/Plan - 3:    ·  Problem: Pulmonary edema.  Plan: Admit to monitored unit for cardiac monitoring, obtain echo to evaluate LVEF, intravenous diuresis prn as per Dr Albright , monitor ins/outs, monitor renal profile and electrolytes closely, cardiology consult ,send 3 sets of ensymes, O2 supply, serial chest xrays, monitor weights and oral intake of fluids, nutritionist consult.         Problem/Plan - 4:    ·  Problem: HTN (hypertension).  Plan: continue home medications ,tele monitoring.         Problem/Plan - 5:    ·  Problem: DM (diabetes mellitus).  Plan: CORRECTIVE REGIMEN SLIDING SCALE.         Problem/Plan - 6:    Problem: Depression. Plan: continue home medications.        Problem/Plan - 7:    ·  Problem: Prophylactic measure.  Plan: Gastrointestinal stress ulcer prophylaxis and DVT prophylaxis administrated.

## 2019-12-13 NOTE — CONSULT NOTE ADULT - PROBLEM SELECTOR RECOMMENDATION 9
increase lantus 15 units qam  cont humalog 3 units 3x/day before meals  change mod dose humalog scale coverage qac/qhs  goal bg 100-180 in hosp setting  cont cons cho diet

## 2019-12-13 NOTE — DISCHARGE NOTE PROVIDER - NSDCMRMEDTOKEN_GEN_ALL_CORE_FT
acetaminophen 325 mg oral tablet: 2 tab(s) orally every 6 hours, As needed, Temp greater or equal to 38C (100.4F), Mild Pain (1 - 3)  aspirin 81 mg oral delayed release tablet: 1 tab(s) orally once a day  atorvastatin 40 mg oral tablet: 1 tab(s) orally once a day (at bedtime)  cloNIDine 0.1 mg oral tablet: 1 tab(s) orally once a day (at bedtime)  clopidogrel 75 mg oral tablet: 1 tab(s) orally once a day  epoetin fawad: WITH HD   heparin 100 units/mL-D5% intravenous solution:  intravenous continuous infusion   imipramine 50 mg oral tablet: 1 tab(s) orally once a day  insulin lispro (concentrated) 200 units/mL subcutaneous solution:  subcutaneous CORRECTIVE REGIMEN SLIDING SCALE   ipratropium-albuterol 0.5 mg-2.5 mg/3 mLinhalation solution: 3 milliliter(s) inhaled every 6 hours  Lantus Solostar Pen 100 units/mL subcutaneous solution: 10 units in the am  15 units in the pm   metoprolol succinate 100 mg oral tablet, extended release: 1 tab(s) orally once a day  Multiple Vitamins oral tablet: 1 tab(s) orally once a day  Protonix 40 mg oral delayed release tablet: 1 tab(s) orally once a day

## 2019-12-13 NOTE — PROGRESS NOTE ADULT - ASSESSMENT
pt with nstemi - non heparin  had syncope - ct brain - neg  will discuss with dr shravan ricketts , s/p cabg  esrd - on hd  pt will be transferred to [facility] for angiogram [General Appearance - Well Developed] : well developed [Normal Appearance] : normal appearance [Well Groomed] : well groomed [General Appearance - Well Nourished] : well nourished [No Deformities] : no deformities [General Appearance - In No Acute Distress] : no acute distress [Normal Conjunctiva] : the conjunctiva exhibited no abnormalities [Eyelids - No Xanthelasma] : the eyelids demonstrated no xanthelasmas [Normal Oral Mucosa] : normal oral mucosa [No Oral Pallor] : no oral pallor [No Oral Cyanosis] : no oral cyanosis [Normal Jugular Venous A Waves Present] : normal jugular venous A waves present [Normal Jugular Venous V Waves Present] : normal jugular venous V waves present [No Jugular Venous Floyd A Waves] : no jugular venous floyd A waves [Respiration, Rhythm And Depth] : normal respiratory rhythm and effort [Exaggerated Use Of Accessory Muscles For Inspiration] : no accessory muscle use [Auscultation Breath Sounds / Voice Sounds] : lungs were clear to auscultation bilaterally [Chest Palpation] : palpation of the chest revealed no abnormalities [Lungs Percussion] : the lungs were normal to percussion [Heart Rate And Rhythm] : heart rate and rhythm were normal [Heart Sounds] : normal S1 and S2 [Murmurs] : no murmurs present [Arterial Pulses Normal] : the arterial pulses were normal [Edema] : no peripheral edema present [Veins - Varicosity Changes] : no varicosital changes were noted in the lower extremities [Bowel Sounds] : normal bowel sounds [Abdomen Soft] : soft [Abdomen Tenderness] : non-tender [Abdomen Mass (___ Cm)] : no abdominal mass palpated [Abdomen Hernia] : no hernia was discovered [Abnormal Walk] : normal gait [Gait - Sufficient For Exercise Testing] : the gait was sufficient for exercise testing [Nail Clubbing] : no clubbing of the fingernails [Cyanosis, Localized] : no localized cyanosis [Petechial Hemorrhages (___cm)] : no petechial hemorrhages [Skin Color & Pigmentation] : normal skin color and pigmentation [Skin Turgor] : normal skin turgor [] : no rash [No Venous Stasis] : no venous stasis [Skin Lesions] : no skin lesions [No Skin Ulcers] : no skin ulcer [No Xanthoma] : no  xanthoma was observed [Oriented To Time, Place, And Person] : oriented to person, place, and time [Impaired Insight] : insight and judgment were intact [Affect] : the affect was normal [Mood] : the mood was normal [Memory Recent] : recent memory was not impaired [Memory Remote] : remote memory was not impaired [No Anxiety] : not feeling anxious

## 2019-12-13 NOTE — CONSULT NOTE ADULT - SUBJECTIVE AND OBJECTIVE BOX
Patient is a 69y old  Female who presents with a chief complaint of CHEST PAIN AND DYSPNEA (13 Dec 2019 07:43)      Reason For Consult:     HPI:  68yo female with hx of  CAD (coronary artery disease)  ,CRF (chronic renal failure)  ,Depression  ,Diabetes mellitus II  , ESRD , Dialysis patient    h/o Anxiety attack  ,h/o Hepatitis A 1969 ,h/o Myocardial infarct 2007  ,h/o Smoking  quitted 3/2012 ,HTN (hypertension)  ,Murmur, cardiac  ,PAD (peripheral artery disease) bib EMS  with chest pain . Patient  states it started last night  with tightness and sob, diffuse, non radiating, no cough, fever, chills, no diaphoresis CTT demonstrated pulmonary edema ,found to have LARISA elevation Cardiology and nephrology consults requested by ER Admitted  to telemetry unit for monitoring , send 3 sets of cardiac ensymes to rule out coronary event, obtain ECHO to evaluate LVEF, cardiology consult ,continue current management, O2 supply, anticoagulation plan as per cardiology consult Dr ALBRIGHT .Case d/w MD on call Dr Perez ,patient will be scheduled for HD session , obtain echo to evaluate LVEF, intravenous diuresis, monitor ins/outs, monitor renal profile and electrolytes closely, cardiology consult ,send 3 sets of ensymes, O2 supply, serial chest xrays, monitor weights and oral intake of fluids, nutritionist consult Palliative care consult requested ,to discuss advance directives and complete MOLST (12 Dec 2019 06:58)      PAST MEDICAL & SURGICAL HISTORY:  Dialysis patient  CRF (chronic renal failure), unspecified stage  h/o Smoking: quitted 3/2012  Murmur, cardiac  PAD (peripheral artery disease)  h/o Hepatitis A 1969: currently resolved, no symptoms  CAD (coronary artery disease)  h/o Myocardial infarct 2007  Depression  h/o Anxiety attack  HTN (hypertension)  Diabetes mellitus II  s/p surgical removal of benign Skin lesion epigastric area  s/p Ovarian cyst removal  coronary stent 2007      FAMILY HISTORY:  No pertinent family history in first degree relatives        Social History:    MEDICATIONS  (STANDING):  albuterol/ipratropium for Nebulization 3 milliLiter(s) Nebulizer every 6 hours  aspirin enteric coated 81 milliGRAM(s) Oral daily  atorvastatin 40 milliGRAM(s) Oral at bedtime  cloNIDine 0.1 milliGRAM(s) Oral at bedtime  clopidogrel Tablet 75 milliGRAM(s) Oral daily  dextrose 5%. 1000 milliLiter(s) (50 mL/Hr) IV Continuous <Continuous>  dextrose 50% Injectable 12.5 Gram(s) IV Push once  dextrose 50% Injectable 25 Gram(s) IV Push once  dextrose 50% Injectable 25 Gram(s) IV Push once  epoetin fawad Injectable 38358 Unit(s) IV Push <User Schedule>  heparin  Infusion.  Unit(s)/Hr (9 mL/Hr) IV Continuous <Continuous>  imipramine 50 milliGRAM(s) Oral daily  insulin glargine Injectable (LANTUS) 10 Unit(s) SubCutaneous every morning  insulin lispro (HumaLOG) corrective regimen sliding scale   SubCutaneous three times a day before meals  insulin lispro (HumaLOG) corrective regimen sliding scale   SubCutaneous at bedtime  insulin lispro Injectable (HumaLOG) 3 Unit(s) SubCutaneous three times a day before meals  metoprolol succinate  milliGRAM(s) Oral daily  multivitamin 1 Tablet(s) Oral daily    MEDICATIONS  (PRN):  acetaminophen   Tablet .. 650 milliGRAM(s) Oral every 6 hours PRN Temp greater or equal to 38C (100.4F), Mild Pain (1 - 3)  dextrose 40% Gel 15 Gram(s) Oral once PRN Blood Glucose LESS THAN 70 milliGRAM(s)/deciliter  glucagon  Injectable 1 milliGRAM(s) IntraMuscular once PRN Glucose LESS THAN 70 milligrams/deciliter  heparin  Injectable 4400 Unit(s) IV Push every 6 hours PRN For aPTT less than 40        T(C): 37.4 (12-13-19 @ 06:54), Max: 38.3 (12-13-19 @ 04:24)  HR: 97 (12-13-19 @ 07:43) (71 - 108)  BP: 134/77 (12-13-19 @ 04:24) (134/77 - 170/89)  RR: 18 (12-13-19 @ 04:24) (12 - 19)  SpO2: 93% (12-13-19 @ 07:43) (91% - 99%)  Wt(kg): --    PHYSICAL EXAM:  GENERAL: NAD, well-groomed, well-developed  HEAD:  Atraumatic, Normocephalic  NECK: Supple, No JVD, Normal thyroid  CHEST/LUNG: Clear to percussion bilaterally; No rales, rhonchi, wheezing, or rubs  HEART: Regular rate and rhythm; No murmurs, rubs, or gallops  ABDOMEN: Soft, Nontender, Nondistended; Bowel sounds present  EXTREMITIES:  2+ Peripheral Pulses, No clubbing, cyanosis, or edema  SKIN: No rashes or lesions    CAPILLARY BLOOD GLUCOSE      POCT Blood Glucose.: 406 mg/dL (12 Dec 2019 21:41)  POCT Blood Glucose.: 233 mg/dL (12 Dec 2019 16:20)  POCT Blood Glucose.: 315 mg/dL (12 Dec 2019 11:41)  POCT Blood Glucose.: 374 mg/dL (12 Dec 2019 07:54)                            9.7    15.63 )-----------( 255      ( 13 Dec 2019 07:11 )             29.5       CMP:  12-13 @ 07:11  SGPT --  Albumin --   Alk Phos --   Anion Gap 13   SGOT --   Total Bili --   BUN 49   Calcium Total 9.3   CO2 26   Chloride 95   Creatinine 6.00   eGFR if AA 8   eGFR if non AA 6   Glucose 306   Potassium 4.6   Protein --   Sodium 134      Thyroid Function Tests:      Diabetes Tests:       Radiology:

## 2019-12-13 NOTE — CHART NOTE - NSCHARTNOTEFT_GEN_A_CORE
SHILO KOHLER    Notified by RN that received patient from [facility] with DNR/DNI form in chart, but no actige order in computer.       Interventions taken  Rechecked DNR/DNI form with RN.  DNR order placed in computer   f/u with am team.                      Jorge Tadeo ANP-[facility] #[id number]

## 2019-12-14 DIAGNOSIS — N18.6 END STAGE RENAL DISEASE: ICD-10-CM

## 2019-12-14 DIAGNOSIS — E11.65 TYPE 2 DIABETES MELLITUS WITH HYPERGLYCEMIA: ICD-10-CM

## 2019-12-14 DIAGNOSIS — I73.9 PERIPHERAL VASCULAR DISEASE, UNSPECIFIED: ICD-10-CM

## 2019-12-14 DIAGNOSIS — E78.5 HYPERLIPIDEMIA, UNSPECIFIED: ICD-10-CM

## 2019-12-14 LAB
ANION GAP SERPL CALC-SCNC: 22 MMOL/L — HIGH (ref 5–17)
BUN SERPL-MCNC: 68 MG/DL — HIGH (ref 7–23)
CALCIUM SERPL-MCNC: 9.9 MG/DL — SIGNIFICANT CHANGE UP (ref 8.4–10.5)
CHLORIDE SERPL-SCNC: 85 MMOL/L — LOW (ref 96–108)
CO2 SERPL-SCNC: 23 MMOL/L — SIGNIFICANT CHANGE UP (ref 22–31)
CREAT SERPL-MCNC: 7.59 MG/DL — HIGH (ref 0.5–1.3)
CULTURE RESULTS: SIGNIFICANT CHANGE UP
GLUCOSE BLDC GLUCOMTR-MCNC: 122 MG/DL — HIGH (ref 70–99)
GLUCOSE BLDC GLUCOMTR-MCNC: 159 MG/DL — HIGH (ref 70–99)
GLUCOSE BLDC GLUCOMTR-MCNC: 232 MG/DL — HIGH (ref 70–99)
GLUCOSE BLDC GLUCOMTR-MCNC: 246 MG/DL — HIGH (ref 70–99)
GLUCOSE BLDC GLUCOMTR-MCNC: 252 MG/DL — HIGH (ref 70–99)
GLUCOSE SERPL-MCNC: 248 MG/DL — HIGH (ref 70–99)
HCT VFR BLD CALC: 28.3 % — LOW (ref 34.5–45)
HGB BLD-MCNC: 9.1 G/DL — LOW (ref 11.5–15.5)
MAGNESIUM SERPL-MCNC: 2.2 MG/DL — SIGNIFICANT CHANGE UP (ref 1.6–2.6)
MCHC RBC-ENTMCNC: 31.2 PG — SIGNIFICANT CHANGE UP (ref 27–34)
MCHC RBC-ENTMCNC: 32.2 GM/DL — SIGNIFICANT CHANGE UP (ref 32–36)
MCV RBC AUTO: 96.9 FL — SIGNIFICANT CHANGE UP (ref 80–100)
PHOSPHATE SERPL-MCNC: 5.1 MG/DL — HIGH (ref 2.5–4.5)
PLATELET # BLD AUTO: 252 K/UL — SIGNIFICANT CHANGE UP (ref 150–400)
POTASSIUM SERPL-MCNC: 5.2 MMOL/L — SIGNIFICANT CHANGE UP (ref 3.5–5.3)
POTASSIUM SERPL-SCNC: 5.2 MMOL/L — SIGNIFICANT CHANGE UP (ref 3.5–5.3)
RBC # BLD: 2.92 M/UL — LOW (ref 3.8–5.2)
RBC # FLD: 15.9 % — HIGH (ref 10.3–14.5)
SODIUM SERPL-SCNC: 130 MMOL/L — LOW (ref 135–145)
SPECIMEN SOURCE: SIGNIFICANT CHANGE UP
WBC # BLD: 17.88 K/UL — HIGH (ref 3.8–10.5)
WBC # FLD AUTO: 17.88 K/UL — HIGH (ref 3.8–10.5)

## 2019-12-14 PROCEDURE — 99223 1ST HOSP IP/OBS HIGH 75: CPT

## 2019-12-14 RX ORDER — INSULIN LISPRO 100/ML
VIAL (ML) SUBCUTANEOUS AT BEDTIME
Refills: 0 | Status: DISCONTINUED | OUTPATIENT
Start: 2019-12-14 | End: 2019-12-16

## 2019-12-14 RX ORDER — INSULIN GLARGINE 100 [IU]/ML
14 INJECTION, SOLUTION SUBCUTANEOUS AT BEDTIME
Refills: 0 | Status: DISCONTINUED | OUTPATIENT
Start: 2019-12-14 | End: 2019-12-14

## 2019-12-14 RX ORDER — INSULIN GLARGINE 100 [IU]/ML
10 INJECTION, SOLUTION SUBCUTANEOUS EVERY MORNING
Refills: 0 | Status: DISCONTINUED | OUTPATIENT
Start: 2019-12-14 | End: 2019-12-14

## 2019-12-14 RX ORDER — CALCIUM ACETATE 667 MG
667 TABLET ORAL
Refills: 0 | Status: DISCONTINUED | OUTPATIENT
Start: 2019-12-14 | End: 2019-12-16

## 2019-12-14 RX ORDER — INSULIN GLARGINE 100 [IU]/ML
10 INJECTION, SOLUTION SUBCUTANEOUS EVERY MORNING
Refills: 0 | Status: DISCONTINUED | OUTPATIENT
Start: 2019-12-14 | End: 2019-12-16

## 2019-12-14 RX ORDER — INSULIN GLARGINE 100 [IU]/ML
15 INJECTION, SOLUTION SUBCUTANEOUS AT BEDTIME
Refills: 0 | Status: DISCONTINUED | OUTPATIENT
Start: 2019-12-14 | End: 2019-12-16

## 2019-12-14 RX ORDER — INSULIN LISPRO 100/ML
VIAL (ML) SUBCUTANEOUS
Refills: 0 | Status: DISCONTINUED | OUTPATIENT
Start: 2019-12-14 | End: 2019-12-16

## 2019-12-14 RX ORDER — HEPARIN SODIUM 5000 [USP'U]/ML
5000 INJECTION INTRAVENOUS; SUBCUTANEOUS EVERY 8 HOURS
Refills: 0 | Status: DISCONTINUED | OUTPATIENT
Start: 2019-12-14 | End: 2019-12-16

## 2019-12-14 RX ADMIN — HEPARIN SODIUM 5000 UNIT(S): 5000 INJECTION INTRAVENOUS; SUBCUTANEOUS at 21:34

## 2019-12-14 RX ADMIN — Medication 6 UNIT(S): at 17:15

## 2019-12-14 RX ADMIN — Medication 0.1 MILLIGRAM(S): at 21:35

## 2019-12-14 RX ADMIN — ERYTHROPOIETIN 10000 UNIT(S): 10000 INJECTION, SOLUTION INTRAVENOUS; SUBCUTANEOUS at 09:41

## 2019-12-14 RX ADMIN — PANTOPRAZOLE SODIUM 40 MILLIGRAM(S): 20 TABLET, DELAYED RELEASE ORAL at 05:16

## 2019-12-14 RX ADMIN — Medication 6 UNIT(S): at 13:15

## 2019-12-14 RX ADMIN — Medication 6 UNIT(S): at 08:26

## 2019-12-14 RX ADMIN — Medication 100 MILLIGRAM(S): at 05:16

## 2019-12-14 RX ADMIN — INSULIN GLARGINE 10 UNIT(S): 100 INJECTION, SOLUTION SUBCUTANEOUS at 09:47

## 2019-12-14 RX ADMIN — AMLODIPINE BESYLATE 7.5 MILLIGRAM(S): 2.5 TABLET ORAL at 05:16

## 2019-12-14 RX ADMIN — Medication 50 MILLIGRAM(S): at 13:14

## 2019-12-14 RX ADMIN — Medication 1 TABLET(S): at 13:15

## 2019-12-14 RX ADMIN — Medication 81 MILLIGRAM(S): at 13:14

## 2019-12-14 RX ADMIN — CLOPIDOGREL BISULFATE 75 MILLIGRAM(S): 75 TABLET, FILM COATED ORAL at 13:15

## 2019-12-14 RX ADMIN — Medication 2: at 17:15

## 2019-12-14 RX ADMIN — Medication 6: at 08:45

## 2019-12-14 RX ADMIN — ATORVASTATIN CALCIUM 40 MILLIGRAM(S): 80 TABLET, FILM COATED ORAL at 21:35

## 2019-12-14 NOTE — CONSULT NOTE ADULT - PROBLEM SELECTOR RECOMMENDATION 3
-c/w atorvastatin 40mg daily. Can check fasting lipid profile
BP monitoring,continue current antihypertensive meds, low salt diet,followup with PMD in 1-2 weeks

## 2019-12-14 NOTE — PROGRESS NOTE ADULT - PROBLEM SELECTOR PLAN 4
fs coverage   Admit  ID evaluation,send blood and urine cx,serial lactate levels,monitor vitals closley,ivfs hydration,monitor urine output and renal profile,iv abx as per id cons

## 2019-12-14 NOTE — CONSULT NOTE ADULT - PROBLEM SELECTOR RECOMMENDATION 9
-Patient with uncontrolled Type 2 DM, A1c 9.0. With pts cormorbidities, goal A1C near 7.5-8%.  -Currently on lantus 12 units qhs and lantus 10 units in AM. On a moderate correctional scale with meals and bedtime. Started on humalog  TID this AM. AM Bg elevated so would increase lantus to 15 units qhs and 10 units in AM. can c/w humalog 6 units TID but change to a low dose humalog correctional scale with meals and bedtime.  -DC plan basal/bolus, regimen to be determined  -Will f/u with outpatient endo Dr. Huggins
Excess fluids and waste products will be removed from your blood; your electrolytes will be balanced; your blood pressure will be controlled.

## 2019-12-14 NOTE — CONSULT NOTE ADULT - PROBLEM SELECTOR RECOMMENDATION 2
-BP near goal <130/80, on amlodopine, clonidine, and metoprolol. BP management as per nephrology with pt on HD.
Admit to telemetry unit for monitoring,send 3 sets of cardiac ensymes to rule out coronary event,obtain ECHO to evaluate LVEF,cardiology consult,continue current managmnet,O2 supply,anticoagulation plan as per cardiology consult

## 2019-12-14 NOTE — CHART NOTE - NSCHARTNOTEFT_GEN_A_CORE
SHILO KOHLER    Notified by RN patient with frequent PVC's on cardia monitor over night, no c/o cp or sob. VSS      Interventions taken   check Mg and phos level this am.  cont assess and monitor.  f/u with am team.                      Jorge Tadeo ANP-[facility]  [facility] #[id number]

## 2019-12-14 NOTE — CONSULT NOTE ADULT - ASSESSMENT
68yo female with PMH Hepatitis A, HTN, HLD, MI, CAD s/p CANDE x 2 at [facility], moderate MR, PAD, depression/anxiety, DM2 A1C 9.0, ESRD on HD for past 1.5 years via AV fistula, former smoker, 100 pack years who presented to [facility] with complaints of chest tightness. Transferred for cardiac cath. Endocrine consulted for TYpe 2 DM management.

## 2019-12-14 NOTE — CONSULT NOTE ADULT - ASSESSMENT
68yo  female with PMH Hepatitis A, HTN, HLD, MI, CAD s/p 1V CABG 01/2019 and CANDE x 2 at [facility], moderate MR, PAD, depression/anxiety, DM2 A1C 9.0, ESRD on HD for past 1.5 years via AV fistula, former smoker who presented to [facility] 12/12/19 with complaints of chest tightness/throat tightness and SOB at rest that lasted 4 hours prior to seeking medical attention.  Denied dizziness, diaphoresis, palpitations, nausea, vomiting, peripheral edema, recent weight gain, or syncope. Pt. states she has had lightheadedness/dizziness/near syncope at last three episodes of dialysis. Cardiac biomarkers positive 12.8 -->15.6.  Pt. started on Heparin ACS.  CT chest 12/12/19 pulmonary edema and small bilateral  pleural effusions.  CT head 12/12/19 Moderate cerebral parenchymal volume loss and chronic ischemic white matter changes. No intra-axial or extra-axial hemorrhage edema, territorial infarct or mass effect. Clear sinuses. Cranium intact.   TTE 5/1/18 EF 30% inferior, posterior and lateral walls are severely hypokinetic to akinetic, basal inferior wall thinned and aneurysmal, apex is hypokinetic, as is the distal anteroseptum.  D dimer 12/12/19 274.  WBC 16.8    Pt. transferred to [place] for further evaluation and cardiac cath.  Cardiac cath showed 100% LAD with patent LIMA to LAD, RCA 30%    - c/w DAPT  - c/w statin  - c/w metoprolol, norvasc, clonidine  - add ACEi for decreased LV function  - HD as per renal  - Will follow along with you!

## 2019-12-14 NOTE — PROGRESS NOTE ADULT - ASSESSMENT
68yo  female with PMH Hepatitis A, HTN, HLD, MI, CAD s/p CANDE x 2 at [facility], moderate MR, PAD, depression/anxiety, DM2 A1C 9.0, ESRD on HD for past 1.5 years via AV fistula last treatment 12/12/19 managed by Dr. Perez at [facility], former smoker 100 pack years who presented to [facility] 12/12/19 with complaints of chest tightness/throat tightness and SOB at rest that lasted 4 hours prior to seeking medical attention.  Denied dizziness, diaphoresis, palpitations, nausea, vomiting, peripheral edema, recent weight gain, or syncope. No implanted monitoring deices. Pt. states she has had lightheadedness/dizziness/near syncope at last three episodes of dialysis. Cardiac biomarkers positive 12.8>15.6.  Pt. started on Heparin ACS.  CT chest 12/12/19 pulmonary edema and small bilateral  pleural effusions.  CT head 12/12/19 Moderate cerebral parenchymal volume loss and chronic ischemic white matter changes. No intra-axial or extra-axial hemorrhage edema, territorial infarct or mass effect. Clear sinuses. Cranium intact. TTE 5/1/18 EF 30% inferior, posterior and lateral walls are severely hypokinetic to akinetic, basal inferior wall thinned and aneurysmal, apex is hypokinetic, as is the distal anteroseptum.  D dimer 12/12/19 274.  WBC 16.8---15.6.  Pt. transferred asymptomatic for further evaluation and cardiac cath. Pt. with current [facility] documentation 12/12/19 DNR.

## 2019-12-14 NOTE — CONSULT NOTE ADULT - SUBJECTIVE AND OBJECTIVE BOX
HPI:  68yo female with PMH Hepatitis A, HTN, HLD, MI, CAD s/p CANDE x 2 at [facility], moderate MR, PAD, depression/anxiety, DM2 A1C 9.0, ESRD on HD for past 1.5 years via AV fistula last treatment 12/12/19 managed by Dr. Perez at [facility], former smoker 100 pack years who presented to [facility] 12/12/19 with complaints of chest tightness/throat tightness and SOB at rest that lasted 4 hours prior to seeking medical attention.  Denied dizziness, diaphoresis, palpitations, nausea, vomiting, peripheral edema, recent weight gain, or syncope. No implanted monitoring deices. Pt. states she has had lightheadedness/dizziness/near syncope at last three episodes of dialysis. Cardiac biomarkers positive 12.8>15.6.  Pt. started on Heparin ACS.  CT chest 12/12/19 pulmonary edema and small bilateral  pleural effusions.  CT head 12/12/19 Moderate cerebral parenchymal volume loss and chronic ischemic white matter changes. No intra-axial or extra-axial hemorrhage edema, territorial infarct or mass effect. Clear sinuses. Cranium intact. TTE 5/1/18 EF 30% inferior, posterior and lateral walls are severely hypokinetic to akinetic, basal inferior wall thinned and aneurysmal, apex is hypokinetic, as is the distal anteroseptum.  D dimer 12/12/19 274.  WBC 16.8---15.6.  Pt. transferred asymptomatic for further evaluation and cardiac cath. Pt. with current MOSLT documentation 12/12/19 DNR.    Endocrine History:  Patient with Type 2 DM >15 years. Follows with endocrinologist Dr. Huggins. At home takes lantus 10 units in AM and 15 units in PM. Also on short acting insulin (thinks fiasp)- takes according to SS -200 takes 2 units, 200-250 3 units etc. ISF 50. Has been on insulin past 5 years. Also uses freestyle aidan sensor. Reports BG have been recently consistently high 200s-250s throughout the day. Denies any hypoglycemia. Follows with optho, has retinopathy and ws getting laser treatment. Also with ESRD and has been on HS for past 1.5 years. Also with intermittent neuropathy.       PAST MEDICAL & SURGICAL HISTORY:  Dialysis patient  CRF (chronic renal failure), unspecified stage  h/o Smoking: quitted 3/2012  Murmur, cardiac  PAD (peripheral artery disease)  h/o Hepatitis A 1969: currently resolved, no symptoms  CAD (coronary artery disease)  h/o Myocardial infarct 2007  Depression  h/o Anxiety attack  HTN (hypertension)  Diabetes mellitus II  s/p surgical removal of benign Skin lesion epigastric area  s/p Ovarian cyst removal  coronary stent 2007      FAMILY HISTORY:  Grandmother with Type 2 DM      Social History: former smoker, quit 3 years ago. 100 pack year smoking hx. No alcohol use    Outpatient Medications:  · 	Multiple Vitamins oral tablet: Last Dose Taken:  , 1 tab(s) orally once a day  	home/hosp  · 	epoetin fawad: Last Dose Taken:  , WITH HD   · 	ipratropium-albuterol 0.5 mg-2.5 mg/3 mLinhalation solution: Last Dose Taken:  , 3 milliliter(s) inhaled every 6 hours  	hosp  · 	metoprolol succinate 100 mg oral tablet, extended release: Last Dose Taken:  , 1 tab(s) orally once a day  	home/hosp  · 	clopidogrel 75 mg oral tablet: Last Dose Taken:  , 1 tab(s) orally once a day  	home/hosp  · 	aspirin 81 mg oral delayed release tablet: Last Dose Taken:  , 1 tab(s) orally once a day  	home/hosp  · 	atorvastatin 40 mg oral tablet: Last Dose Taken:  , 1 tab(s) orally once a day (at bedtime)  	home/hosp  · 	insulin lispro (concentrated) 200 units/mL subcutaneous solution: Last Dose Taken:  ,  subcutaneous CORRECTIVE REGIMEN SLIDING SCALE   	hosp  · 	imipramine 50 mg oral tablet: Last Dose Taken:  , 1 tab(s) orally once a day  	home/hosp  · 	heparin 100 units/mL-D5% intravenous solution: Last Dose Taken:  ,  intravenous continuous infusion   · 	cloNIDine 0.1 mg oral tablet: Last Dose Taken:  , 1 tab(s) orally once a day (at bedtime)  	home/hosp  · 	Lantus Solostar Pen 100 units/mL subcutaneous solution: Last Dose Taken:  , 10 units in the am  	15 units in the pm   	home/hosp  · 	Protonix 40 mg oral delayed release tablet: Last Dose Taken:  , 1 tab(s) orally once a day  	hosp  · 	amLODIPine 5 mg oral tablet: Last Dose Taken:  , 1.5 tab(s) orally once a day  	home      MEDICATIONS  (STANDING):  amLODIPine   Tablet 7.5 milliGRAM(s) Oral daily  aspirin enteric coated 81 milliGRAM(s) Oral daily  atorvastatin 40 milliGRAM(s) Oral at bedtime  cloNIDine 0.1 milliGRAM(s) Oral at bedtime  clopidogrel Tablet 75 milliGRAM(s) Oral daily  dextrose 5%. 1000 milliLiter(s) (50 mL/Hr) IV Continuous <Continuous>  dextrose 5%. 1000 milliLiter(s) (50 mL/Hr) IV Continuous <Continuous>  dextrose 50% Injectable 12.5 Gram(s) IV Push once  dextrose 50% Injectable 25 Gram(s) IV Push once  dextrose 50% Injectable 25 Gram(s) IV Push once  dextrose 50% Injectable 12.5 Gram(s) IV Push once  dextrose 50% Injectable 25 Gram(s) IV Push once  dextrose 50% Injectable 25 Gram(s) IV Push once  imipramine 50 milliGRAM(s) Oral daily  insulin glargine Injectable (LANTUS) 14 Unit(s) SubCutaneous at bedtime  insulin glargine Injectable (LANTUS) 10 Unit(s) SubCutaneous every morning  insulin glargine Injectable (LANTUS) 18 Unit(s) SubCutaneous at bedtime  insulin lispro (HumaLOG) corrective regimen sliding scale   SubCutaneous three times a day before meals  insulin lispro (HumaLOG) corrective regimen sliding scale   SubCutaneous at bedtime  insulin lispro Injectable (HumaLOG) 6 Unit(s) SubCutaneous three times a day before meals  metoprolol succinate  milliGRAM(s) Oral daily  multivitamin 1 Tablet(s) Oral daily  pantoprazole    Tablet 40 milliGRAM(s) Oral before breakfast    MEDICATIONS  (PRN):  dextrose 40% Gel 15 Gram(s) Oral once PRN Blood Glucose LESS THAN 70 milliGRAM(s)/deciLiter  dextrose 40% Gel 15 Gram(s) Oral once PRN Blood Glucose LESS THAN 70 milliGRAM(s)/deciliter  glucagon  Injectable 1 milliGRAM(s) IntraMuscular once PRN Glucose LESS THAN 70 milligrams/deciliter  glucagon  Injectable 1 milliGRAM(s) IntraMuscular once PRN Glucose <70 milliGRAM(s)/deciLiter      Allergies    latex (Unknown)  No Known Drug Allergies    Intolerances      Review of Systems:  Constitutional: No fever  Eyes: No blurry vision  Neuro: No tremors  HEENT: No pain  Cardiovascular: No chest pain, palpitations  Respiratory: No SOB, no cough  GI: No nausea, vomiting, abdominal pain  : No dysuria  Skin: no rash  Endocrine: no polyuria, polydipsia  Hem/lymph: no swelling    ALL OTHER SYSTEMS REVIEWED AND NEGATIVE    PHYSICAL EXAM:  VITALS: T(C): 37.2 (12-14-19 @ 09:10)  T(F): 98.9 (12-14-19 @ 09:10), Max: 99.5 (12-14-19 @ 05:08)  HR: 90 (12-14-19 @ 09:10) (85 - 106)  BP: 138/51 (12-14-19 @ 09:10) (127/62 - 142/85)  RR:  (16 - 18)  SpO2:  (93% - 100%)  Wt(kg): --  GENERAL: NAD, well-groomed, well-developed  EYES: No proptosis, no lid lag, anicteric  HEENT:  Atraumatic, Normocephalic, moist mucous membranes  THYROID: Normal size, no palpable nodules  RESPIRATORY: Clear to auscultation bilaterally; No rales, rhonchi, wheezing  CARDIOVASCULAR: Regular rate and rhythm; No murmurs; no peripheral edema  GI: Soft, nontender, non distended, normal bowel sounds  SKIN: Dry, intact, No rashes or lesions +R AV fistula  MUSCULOSKELETAL: Full range of motion, normal strength  NEURO: sensation intact, extraocular movements intact, no tremor  PSYCH: Alert and oriented x 3, normal affect, normal mood  CUSHING'S SIGNS: no striae    POCT Blood Glucose.: 246 mg/dL (12-14-19 @ 09:44)  POCT Blood Glucose.: 252 mg/dL (12-14-19 @ 08:18)  POCT Blood Glucose.: 285 mg/dL (12-13-19 @ 21:32)  POCT Blood Glucose.: 298 mg/dL (12-13-19 @ 19:40)  POCT Blood Glucose.: 318 mg/dL (12-13-19 @ 17:59)  POCT Blood Glucose.: 374 mg/dL (12-13-19 @ 16:13)  POCT Blood Glucose.: 482 mg/dL (12-13-19 @ 12:10)  POCT Blood Glucose.: 300 mg/dL (12-13-19 @ 08:13)  POCT Blood Glucose.: 406 mg/dL (12-12-19 @ 21:41)  POCT Blood Glucose.: 233 mg/dL (12-12-19 @ 16:20)  POCT Blood Glucose.: 315 mg/dL (12-12-19 @ 11:41)  POCT Blood Glucose.: 374 mg/dL (12-12-19 @ 07:54)                            9.1    17.88 )-----------( 252      ( 14 Dec 2019 11:07 )             28.3       12-14    130<L>  |  85<L>  |  68<H>  ----------------------------<  248<H>  5.2   |  23  |  7.59<H>    EGFR if : 6<L>  EGFR if non : 5<L>    Ca    9.9      12-14  Mg     2.2     12-14  Phos  5.1     12-14    TPro  7.5  /  Alb  3.8  /  TBili  0.4  /  DBili  x   /  AST  18  /  ALT  21  /  AlkPhos  102  12-12        Hemoglobin A1C, Whole Blood: 9.1 % <H> [4.0 - 5.6] (12-13-19 @ 08:49)
Patient is a 69y old  Female who presents with a chief complaint of     HPI:  70yo  female with PMH Hepatitis A, HTN, HLD, MI, CAD s/p CANDE x 2 at [facility], moderate MR, PAD, depression/anxiety, DM2 A1C 9.0, ESRD on HD for past 1.5 years via AV fistula last treatment 12/12/19 managed by Dr. Perez at [facility] [place], former smoker 100 pack years who presented to [facility] 12/12/19 with complaints of chest tightness/throat tightness and SOB at rest that lasted 4 hours prior to seeking medical attention.  Denied dizziness, diaphoresis, palpitations, nausea, vomiting, peripheral edema, recent weight gain, or syncope. No implanted monitoring deices. Pt. states she has had lightheadedness/dizziness/near syncope at last three episodes of dialysis. Cardiac biomarkers positive 12.8>15.6.  Pt. started on Heparin ACS.  CT chest 12/12/19 pulmonary edema and small bilateral  pleural effusions.  CT head 12/12/19 Moderate cerebral parenchymal volume loss and chronic ischemic white matter changes. No intra-axial or extra-axial hemorrhage edema, territorial infarct or mass effect. Clear sinuses. Cranium intact. TTE 5/1/18 EF 30% inferior, posterior and lateral walls are severely hypokinetic to akinetic, basal inferior wall thinned and aneurysmal, apex is hypokinetic, as is the distal anteroseptum.  D dimer 12/12/19 274.  WBC 16.8---15.6.  Pt. transferred asymptomatic for further evaluation and cardiac cath. Pt. with current MOSLT documentation 12/12/19 DNR.  Above reviewed:   Pt feels ok, chest pain resolved, no fever or chills, some cough, denies SOB right now. No URI symptoms, make some urine, no dysuria, frequency, no abdominal pain, no flank pain.       PAST MEDICAL & SURGICAL HISTORY:  Dialysis patient  CRF (chronic renal failure), unspecified stage  h/o Smoking: quitted 3/2012  Murmur, cardiac  PAD (peripheral artery disease)  h/o Hepatitis A 1969: currently resolved, no symptoms  CAD (coronary artery disease)  h/o Myocardial infarct 2007  Depression  h/o Anxiety attack  HTN (hypertension)  Diabetes mellitus II  s/p surgical removal of benign Skin lesion epigastric area  s/p Ovarian cyst removal  coronary stent 2007        REVIEW OF SYSTEMS    General: Denies any chills. Fevers absent    Skin: No rash  	  Ophthalmologic: Denies any discharge, redness.  	  ENMT: No nasal congestion or throat pain.     Respiratory and Thorax: Occasional cough, no sputum. Denies shortness of breath.  	  Cardiovascular: Per HPI     Gastrointestinal: No nausea, abdominal pain or diarrhea.    Genitourinary: Per HPI.    Musculoskeletal: No joint swelling or pain.    Neurological: No extremity weakness.    Psychiatric: No hallucinations	    Endocrine: No abnormal heat/cold intolerance    Allergic/Immunologic:	No hives or rash       Social history:  Former smoker, lives with family.       FAMILY HISTORY:  Father: CAD	  	  Mother: Alzeimer's, lung cancer	      Allergies  latex (Unknown)  No Known Drug Allergies      Antimicrobials:      Vital Signs Last 24 Hrs  T(C): 37.4 (13 Dec 2019 16:10), Max: 38.3 (13 Dec 2019 04:24)  T(F): 99.4 (13 Dec 2019 16:10), Max: 101 (13 Dec 2019 04:24)  HR: 96 (13 Dec 2019 16:40) (89 - 108)  BP: 131/68 (13 Dec 2019 16:40) (127/62 - 149/80)  BP(mean): 83 (13 Dec 2019 11:59) (83 - 83)  RR: 17 (13 Dec 2019 16:40) (16 - 19)  SpO2: 100% (13 Dec 2019 16:40) (92% - 100%)      PHYSICAL EXAM: Patient in no acute distress.    Constitutional: Comfortable. Awake and alert    Eyes: No discharge or conjunctival injection    ENT: No thrush.     Neck: Supple,     Respiratory: + air entry bilaterally.    Cardiovascular: S1 S2 wnl, No murmurs, tachy     Gastrointestinal: Soft BS(+) no tenderness, non distended.    Genitourinary: No CVA tenderness    Extremities: No edema. Rt groin sheath.     Vascular: Rt arm av fistula     Neurological: No grossly focal deficits.    Skin: No rash     Musculoskeletal: No joint swelling.    Psychiatric: Affect normal.                              9.7    15.63 )-----------( 255      ( 13 Dec 2019 07:11 )             29.5       12-13    134<L>  |  95<L>  |  49<H>  ----------------------------<  306<H>  4.6   |  26  |  6.00<H>    Ca    9.3      13 Dec 2019 07:11  Phos  3.4     12-12  Mg     1.9     12-12    TPro  7.5  /  Alb  3.8  /  TBili  0.4  /  DBili  x   /  AST  18  /  ALT  21  /  AlkPhos  102  12-12      Urinalysis (12.13.19 @ 08:09)    Glucose Qualitative, Urine: 1000 mg/dL    Blood, Urine: Small    pH Urine: 9.0    Color: Yellow    Urine Appearance: Slightly Turbid    Bilirubin: Negative    Ketone - Urine: Small    Specific Gravity: 1.015    Protein, Urine: 500 mg/dL    Urobilinogen: Negative    Nitrite: Negative    Leukocyte Esterase Concentration: Moderate    Red Blood Cell - Urine: 0-2 /HPF    White Blood Cell - Urine: 11-25    Bacteria: Few    Comment - Urine: Few Mucus Strands    Epithelial Cells: Few        Culture - Blood (collected 12 Dec 2019 12:10)  Source: .Blood Blood  Preliminary Report (13 Dec 2019 13:01):    No growth to date.    Culture - Blood (collected 12 Dec 2019 12:10)  Source: .Blood Blood  Preliminary Report (13 Dec 2019 13:01):    No growth to date.        Radiology: Imaging reviewed personally [ x]  < from: CT Head No Cont (12.12.19 @ 18:16) >  Impression: No acute or focal brain pathology on this noncontrast study.   Involutional, chronic microangiopathic change      < from: CT Chest No Cont (12.12.19 @ 01:38) >  IMPRESSION:     Pulmonary edema and small bilateral pleural effusions.      < from: Xray Chest 1 View AP/PA (12.12.19 @ 00:16) >  IMPRESSION:  Cardiomegaly with congestive changes. Patchy airspace disease both lungs   along with septal thickening, most prominent in the right lung base.
· Subjective and Objective:   [facility] CARDIOLOGY CONSULTANTS:    Lorraine Gruber, Laron, Dee, Joseph Carson, Amira Parker      [phone]    CHIEF COMPLAINT: Patient is a 69y old  Female who presents with a chief complaint of catheterization (14 Dec 2019 11:54)    68yo  female with PMH Hepatitis A, HTN, HLD, MI, CAD s/p 1V CABG 2019 and CANDE x 2 at [facility], moderate MR, PAD, depression/anxiety, DM2 A1C 9.0, ESRD on HD for past 1.5 years via AV fistula, former smoker who presented to [facility] 19 with complaints of chest tightness/throat tightness and SOB at rest that lasted 4 hours prior to seeking medical attention.  Denied dizziness, diaphoresis, palpitations, nausea, vomiting, peripheral edema, recent weight gain, or syncope. Pt. states she has had lightheadedness/dizziness/near syncope at last three episodes of dialysis. Cardiac biomarkers positive 12.8 -->15.6.  Pt. started on Heparin ACS.  CT chest 19 pulmonary edema and small bilateral  pleural effusions.  CT head 19 Moderate cerebral parenchymal volume loss and chronic ischemic white matter changes. No intra-axial or extra-axial hemorrhage edema, territorial infarct or mass effect. Clear sinuses. Cranium intact.   TTE 18 EF 30% inferior, posterior and lateral walls are severely hypokinetic to akinetic, basal inferior wall thinned and aneurysmal, apex is hypokinetic, as is the distal anteroseptum.  D dimer 19 274.  WBC 16.8    Pt. transferred to [place] for further evaluation and cardiac cath.  Cardiac cath showed 100% LAD with patent LIMA to LAD, RCA 30%    TELEMETRY: NSR PVCs    REVIEW OF SYSTEMS otherwise negative unless noted          PAST MEDICAL & SURGICAL HISTORY:  Dialysis patient  CRF (chronic renal failure), unspecified stage  h/o Smoking: quitted 3/2012  Murmur, cardiac  PAD (peripheral artery disease)  h/o Hepatitis A : currently resolved, no symptoms  CAD (coronary artery disease)  h/o Myocardial infarct   Depression  h/o Anxiety attack  HTN (hypertension)  Diabetes mellitus II  s/p surgical removal of benign Skin lesion epigastric area  s/p Ovarian cyst removal  coronary stent 2007      MEDICATIONS  (STANDING):  amLODIPine   Tablet 7.5 milliGRAM(s) Oral daily  aspirin enteric coated 81 milliGRAM(s) Oral daily  atorvastatin 40 milliGRAM(s) Oral at bedtime  cloNIDine 0.1 milliGRAM(s) Oral at bedtime  clopidogrel Tablet 75 milliGRAM(s) Oral daily  dextrose 5%. 1000 milliLiter(s) (50 mL/Hr) IV Continuous <Continuous>  dextrose 5%. 1000 milliLiter(s) (50 mL/Hr) IV Continuous <Continuous>  dextrose 50% Injectable 12.5 Gram(s) IV Push once  dextrose 50% Injectable 25 Gram(s) IV Push once  dextrose 50% Injectable 25 Gram(s) IV Push once  dextrose 50% Injectable 12.5 Gram(s) IV Push once  dextrose 50% Injectable 25 Gram(s) IV Push once  dextrose 50% Injectable 25 Gram(s) IV Push once  imipramine 50 milliGRAM(s) Oral daily  insulin glargine Injectable (LANTUS) 10 Unit(s) SubCutaneous every morning  insulin glargine Injectable (LANTUS) 15 Unit(s) SubCutaneous at bedtime  insulin lispro (HumaLOG) corrective regimen sliding scale   SubCutaneous three times a day before meals  insulin lispro (HumaLOG) corrective regimen sliding scale   SubCutaneous at bedtime  insulin lispro Injectable (HumaLOG) 6 Unit(s) SubCutaneous three times a day before meals  metoprolol succinate  milliGRAM(s) Oral daily  multivitamin 1 Tablet(s) Oral daily  pantoprazole    Tablet 40 milliGRAM(s) Oral before breakfast      Allergies    latex (Unknown)  No Known Drug Allergies    Intolerances                              9.1    17.88 )-----------( 252      ( 14 Dec 2019 11:07 )             28.3       12-14    130<L>  |  85<L>  |  68<H>  ----------------------------<  248<H>  5.2   |  23  |  7.59<H>    Ca    9.9      14 Dec 2019 07:55  Phos  5.1     12-14  Mg     2.2     12-14            PTT - ( 13 Dec 2019 07:11 )  PTT:53.6 sec                      Daily     Daily Weight in k.9 (14 Dec 2019 09:10)    I&O's Summary    13 Dec 2019 07:01  -  14 Dec 2019 07:00  --------------------------------------------------------  IN: 170 mL / OUT: 0 mL / NET: 170 mL        Vital Signs Last 24 Hrs  T(C): 37.2 (14 Dec 2019 09:10), Max: 37.5 (14 Dec 2019 05:08)  T(F): 98.9 (14 Dec 2019 09:10), Max: 99.5 (14 Dec 2019 05:08)  HR: 92 (14 Dec 2019 13:14) (85 - 106)  BP: 109/50 (14 Dec 2019 13:14) (109/50 - 142/85)  BP(mean): --  RR: 16 (14 Dec 2019 13:14) (16 - 18)  SpO2: 97% (14 Dec 2019 13:14) (93% - 100%)    PHYSICAL EXAM:   · Constitutional	Well-developed, well nourished  · Eyes	EOMI; PERRL; no drainage or redness  · ENMT	No oral lesions; no gross abnormalities  · Neck	No bruits; no thyromegaly or nodules  · Respiratory	Normal breath sounds b/l, No RRW  · Cardiovascular	Regular rate & rhythm, normal S1, S2; no murmurs, gallops or rubs; no S3, S4  · Gastrointestinal	Soft, non-tender, no hepatosplenomegaly, normal bowel sounds  · Extremities	No cyanosis, clubbing or edema  · Vascular	Equal and normal pulses (carotid, femoral, dorsalis pedis)  · Neurological	Alert & oriented; no sensory, motor or coordination deficits, normal reflexes
Patient is a 69y Female whom presented to the hospital with esrd on hd     PAST MEDICAL & SURGICAL HISTORY:  Dialysis patient  CRF (chronic renal failure), unspecified stage  h/o Smoking: quitted 3/2012  Murmur, cardiac  PAD (peripheral artery disease)  h/o Hepatitis A 1969: currently resolved, no symptoms  CAD (coronary artery disease)  h/o Myocardial infarct   Depression  h/o Anxiety attack  HTN (hypertension)  Diabetes mellitus II  s/p surgical removal of benign Skin lesion epigastric area  s/p Ovarian cyst removal  coronary stent       MEDICATIONS  (STANDING):  amLODIPine   Tablet 7.5 milliGRAM(s) Oral daily  aspirin enteric coated 81 milliGRAM(s) Oral daily  atorvastatin 40 milliGRAM(s) Oral at bedtime  cloNIDine 0.1 milliGRAM(s) Oral at bedtime  clopidogrel Tablet 75 milliGRAM(s) Oral daily  dextrose 5%. 1000 milliLiter(s) (50 mL/Hr) IV Continuous <Continuous>  dextrose 5%. 1000 milliLiter(s) (50 mL/Hr) IV Continuous <Continuous>  dextrose 50% Injectable 12.5 Gram(s) IV Push once  dextrose 50% Injectable 25 Gram(s) IV Push once  dextrose 50% Injectable 25 Gram(s) IV Push once  dextrose 50% Injectable 12.5 Gram(s) IV Push once  dextrose 50% Injectable 25 Gram(s) IV Push once  dextrose 50% Injectable 25 Gram(s) IV Push once  imipramine 50 milliGRAM(s) Oral daily  insulin glargine Injectable (LANTUS) 8 Unit(s) SubCutaneous every morning  insulin glargine Injectable (LANTUS) 12 Unit(s) SubCutaneous at bedtime  insulin glargine Injectable (LANTUS) 18 Unit(s) SubCutaneous at bedtime  insulin lispro (HumaLOG) corrective regimen sliding scale   SubCutaneous three times a day before meals  insulin lispro (HumaLOG) corrective regimen sliding scale   SubCutaneous at bedtime  insulin lispro (HumaLOG) corrective regimen sliding scale   SubCutaneous three times a day before meals  insulin lispro (HumaLOG) corrective regimen sliding scale   SubCutaneous at bedtime  insulin lispro Injectable (HumaLOG) 6 Unit(s) SubCutaneous three times a day before meals  metoprolol succinate  milliGRAM(s) Oral daily  multivitamin 1 Tablet(s) Oral daily  pantoprazole    Tablet 40 milliGRAM(s) Oral before breakfast      Allergies    latex (Unknown)  No Known Drug Allergies    Intolerances        SOCIAL HISTORY:  Denies ETOh,Smoking,     FAMILY HISTORY:  No pertinent family history in first degree relatives      REVIEW OF SYSTEMS:    CONSTITUTIONAL: No weakness, fevers or chills  EYES/ENT: No visual changes;  no throat pain   NECK: No pain or stiffness  RESPIRATORY: No cough, wheezing, hemoptysis; No shortness of breath  CARDIOVASCULAR: No chest pain or palpitations  GASTROINTESTINAL: No abdominal or epigastric pain. No nausea, vomiting,     No diarrhea or constipation. No melena   GENITOURINARY: No dysuria, frequency or hematuria  NEUROLOGICAL: No numbness or weakness  SKIN: dry      VITAL:  T(C): , Max: 38.3 (19 @ 04:24)  T(F): , Max: 101 (19 @ 04:24)  HR: 106 (19 @ 19:00)  BP: 138/71 (19 @ 19:00)  BP(mean): 83 (19 @ 11:59)  RR: 17 (19 @ 17:30)  SpO2: 98% (19 @ 17:30)  Wt(kg): --    I and O's:     @ 07:01  -   @ 19:10  --------------------------------------------------------  IN: 120 mL / OUT: 0 mL / NET: 120 mL      Height (cm): 175.3 ( @ 12:02)  Weight (kg): 71.7 ( @ 12:53)  BMI (kg/m2): 23.3 ( @ 12:53)  BSA (m2): 1.87 ( @ 12:53)    PHYSICAL EXAM:    Constitutional: NAD  HEENT: conjunctive   clear   Neck:  No JVD  Respiratory: CTAB  Cardiovascular: S1 and S2  Gastrointestinal: BS+, soft, NT/ND  Extremities: No peripheral edema  Neurological: A/O x 3, no focal deficits  Psychiatric: Normal mood, normal affect  : No Renteria  Skin: dry  Access: pos fistula     LABS:                        9.7    15.63 )-----------( 255      ( 13 Dec 2019 07:11 )             29.5     12-13    134<L>  |  95<L>  |  49<H>  ----------------------------<  306<H>  4.6   |  26  |  6.00<H>    Ca    9.3      13 Dec 2019 07:11  Phos  3.4     12-12  Mg     1.9     12-12    TPro  7.5  /  Alb  3.8  /  TBili  0.4  /  DBili  x   /  AST  18  /  ALT  21  /  AlkPhos  102  12-12      Urine Studies:  Urinalysis Basic - ( 13 Dec 2019 08:09 )    Color: Yellow / Appearance: Slightly Turbid / S.015 / pH: x  Gluc: x / Ketone: Small  / Bili: Negative / Urobili: Negative   Blood: x / Protein: 500 mg/dL / Nitrite: Negative   Leuk Esterase: Moderate / RBC: 0-2 /HPF / WBC 11-25   Sq Epi: x / Non Sq Epi: Few / Bacteria: Few            RADIOLOGY & ADDITIONAL STUDIES:

## 2019-12-14 NOTE — CONSULT NOTE ADULT - ATTENDING COMMENTS
Gabriela Monroy  Pager: [phone]. If no response or past 5 pm call [phone].     Please call ID service for questions over weekend at [phone].
Uncontrolled DM2. Agree with basal bolus insulin as outlined above.

## 2019-12-14 NOTE — PROGRESS NOTE ADULT - SUBJECTIVE AND OBJECTIVE BOX
SUBJECTIVE / OVERNIGHT EVENTS: pt denies chest pain, shortness of breath, nausea,v       MEDICATIONS  (STANDING):  amLODIPine   Tablet 7.5 milliGRAM(s) Oral daily  aspirin enteric coated 81 milliGRAM(s) Oral daily  atorvastatin 40 milliGRAM(s) Oral at bedtime  calcium acetate 667 milliGRAM(s) Oral three times a day with meals  cloNIDine 0.1 milliGRAM(s) Oral at bedtime  clopidogrel Tablet 75 milliGRAM(s) Oral daily  dextrose 5%. 1000 milliLiter(s) (50 mL/Hr) IV Continuous <Continuous>  dextrose 5%. 1000 milliLiter(s) (50 mL/Hr) IV Continuous <Continuous>  dextrose 50% Injectable 12.5 Gram(s) IV Push once  dextrose 50% Injectable 25 Gram(s) IV Push once  dextrose 50% Injectable 25 Gram(s) IV Push once  dextrose 50% Injectable 12.5 Gram(s) IV Push once  dextrose 50% Injectable 25 Gram(s) IV Push once  dextrose 50% Injectable 25 Gram(s) IV Push once  imipramine 50 milliGRAM(s) Oral daily  insulin glargine Injectable (LANTUS) 10 Unit(s) SubCutaneous every morning  insulin glargine Injectable (LANTUS) 15 Unit(s) SubCutaneous at bedtime  insulin lispro (HumaLOG) corrective regimen sliding scale   SubCutaneous three times a day before meals  insulin lispro (HumaLOG) corrective regimen sliding scale   SubCutaneous at bedtime  insulin lispro Injectable (HumaLOG) 6 Unit(s) SubCutaneous three times a day before meals  metoprolol succinate  milliGRAM(s) Oral daily  multivitamin 1 Tablet(s) Oral daily  pantoprazole    Tablet 40 milliGRAM(s) Oral before breakfast    MEDICATIONS  (PRN):  dextrose 40% Gel 15 Gram(s) Oral once PRN Blood Glucose LESS THAN 70 milliGRAM(s)/deciLiter  dextrose 40% Gel 15 Gram(s) Oral once PRN Blood Glucose LESS THAN 70 milliGRAM(s)/deciliter  glucagon  Injectable 1 milliGRAM(s) IntraMuscular once PRN Glucose LESS THAN 70 milligrams/deciliter  glucagon  Injectable 1 milliGRAM(s) IntraMuscular once PRN Glucose <70 milliGRAM(s)/deciLiter    Vital Signs Last 24 Hrs  T(C): 36.4 (14 Dec 2019 12:30), Max: 37.5 (14 Dec 2019 05:08)  T(F): 97.6 (14 Dec 2019 12:30), Max: 99.5 (14 Dec 2019 05:08)  HR: 92 (14 Dec 2019 13:14) (89 - 105)  BP: 109/50 (14 Dec 2019 13:14) (109/50 - 138/51)  BP(mean): --  RR: 16 (14 Dec 2019 13:14) (16 - 18)  SpO2: 97% (14 Dec 2019 13:14) (93% - 100%)    CAPILLARY BLOOD GLUCOSE      POCT Blood Glucose.: 232 mg/dL (14 Dec 2019 16:36)  POCT Blood Glucose.: 122 mg/dL (14 Dec 2019 13:13)  POCT Blood Glucose.: 246 mg/dL (14 Dec 2019 09:44)  POCT Blood Glucose.: 252 mg/dL (14 Dec 2019 08:18)  POCT Blood Glucose.: 285 mg/dL (13 Dec 2019 21:32)    I&O's Summary    13 Dec 2019 07:01  -  14 Dec 2019 07:00  --------------------------------------------------------  IN: 170 mL / OUT: 0 mL / NET: 170 mL    14 Dec 2019 07:01  -  14 Dec 2019 20:32  --------------------------------------------------------  IN: 120 mL / OUT: 2000 mL / NET: -1880 mL        Constitutional: No fever, fatigue  Skin: No rash.  Eyes: No recent vision problems or eye pain.  ENT: No congestion, ear pain, or sore throat.  Cardiovascular: No chest pain or palpation.  Respiratory: No cough, shortness of breath, congestion, or wheezing.  Gastrointestinal: No abdominal pain, nausea, vomiting, or diarrhea.  Genitourinary: No dysuria.  Musculoskeletal: No joint swelling.  Neurologic: No headache.    PHYSICAL EXAM:  GENERAL: NAD  EYES: EOMI, PERRLA  NECK: Supple, No JVD  CHEST/LUNG: dec breath sounds at bases   HEART:  S1 , S2 +  ABDOMEN: soft , bs+  EXTREMITIES:  trace edema  NEUROLOGY:alert awake       LABS:                        9.1    17.88 )-----------( 252      ( 14 Dec 2019 11:07 )             28.3     12-14    130<L>  |  85<L>  |  68<H>  ----------------------------<  248<H>  5.2   |  23  |  7.59<H>    Ca    9.9      14 Dec 2019 07:55  Phos  5.1     12-  Mg     2.2     12-14      PTT - ( 13 Dec 2019 07:11 )  PTT:53.6 sec      Urinalysis Basic - ( 13 Dec 2019 08:09 )    Color: Yellow / Appearance: Slightly Turbid / S.015 / pH: x  Gluc: x / Ketone: Small  / Bili: Negative / Urobili: Negative   Blood: x / Protein: 500 mg/dL / Nitrite: Negative   Leuk Esterase: Moderate / RBC: 0-2 /HPF / WBC 11-25   Sq Epi: x / Non Sq Epi: Few / Bacteria: Few        RADIOLOGY & ADDITIONAL TESTS:    Imaging Personally Reviewed:    Consultant(s) Notes Reviewed:      Care Discussed with Consultants/Other Providers:

## 2019-12-14 NOTE — PROGRESS NOTE ADULT - SUBJECTIVE AND OBJECTIVE BOX
Patient is a 69y Female whom presented to the hospital with esrd on hd   pateint seen and examined nad , no fever , no chills    PAST MEDICAL & SURGICAL HISTORY:  Dialysis patient  CRF (chronic renal failure), unspecified stage  h/o Smoking: quitted 3/2012  Murmur, cardiac  PAD (peripheral artery disease)  h/o Hepatitis A : currently resolved, no symptoms  CAD (coronary artery disease)  h/o Myocardial infarct   Depression  h/o Anxiety attack  HTN (hypertension)  Diabetes mellitus II  s/p surgical removal of benign Skin lesion epigastric area  s/p Ovarian cyst removal  coronary stent       MEDICATIONS  (STANDING):  amLODIPine   Tablet 7.5 milliGRAM(s) Oral daily  aspirin enteric coated 81 milliGRAM(s) Oral daily  atorvastatin 40 milliGRAM(s) Oral at bedtime  cloNIDine 0.1 milliGRAM(s) Oral at bedtime  clopidogrel Tablet 75 milliGRAM(s) Oral daily  dextrose 5%. 1000 milliLiter(s) (50 mL/Hr) IV Continuous <Continuous>  dextrose 5%. 1000 milliLiter(s) (50 mL/Hr) IV Continuous <Continuous>  dextrose 50% Injectable 12.5 Gram(s) IV Push once  dextrose 50% Injectable 25 Gram(s) IV Push once  dextrose 50% Injectable 25 Gram(s) IV Push once  dextrose 50% Injectable 12.5 Gram(s) IV Push once  dextrose 50% Injectable 25 Gram(s) IV Push once  dextrose 50% Injectable 25 Gram(s) IV Push once  imipramine 50 milliGRAM(s) Oral daily  insulin glargine Injectable (LANTUS) 8 Unit(s) SubCutaneous every morning  insulin glargine Injectable (LANTUS) 12 Unit(s) SubCutaneous at bedtime  insulin glargine Injectable (LANTUS) 18 Unit(s) SubCutaneous at bedtime  insulin lispro (HumaLOG) corrective regimen sliding scale   SubCutaneous three times a day before meals  insulin lispro (HumaLOG) corrective regimen sliding scale   SubCutaneous at bedtime  insulin lispro (HumaLOG) corrective regimen sliding scale   SubCutaneous three times a day before meals  insulin lispro (HumaLOG) corrective regimen sliding scale   SubCutaneous at bedtime  insulin lispro Injectable (HumaLOG) 6 Unit(s) SubCutaneous three times a day before meals  metoprolol succinate  milliGRAM(s) Oral daily  multivitamin 1 Tablet(s) Oral daily  pantoprazole    Tablet 40 milliGRAM(s) Oral before breakfast      Allergies    latex (Unknown)  No Known Drug Allergies    Intolerances        SOCIAL HISTORY:  Denies ETOh,Smoking,     FAMILY HISTORY:  No pertinent family history in first degree relatives      REVIEW OF SYSTEMS:    CONSTITUTIONAL: No weakness, fevers or chills  EYES/ENT: No visual changes;  no throat pain   NECK: No pain or stiffness  RESPIRATORY: No cough, wheezing, hemoptysis; No shortness of breath  CARDIOVASCULAR: No chest pain or palpitations  GASTROINTESTINAL: No abdominal or epigastric pain. No nausea, vomiting,     No diarrhea or constipation. No melena   GENITOURINARY: No dysuria, frequency or hematuria  NEUROLOGICAL: No numbness or weakness  SKIN: dry                            9.1    17.88 )-----------( 252      ( 14 Dec 2019 11:07 )             28.3       CBC Full  -  ( 14 Dec 2019 11:07 )  WBC Count : 17.88 K/uL  RBC Count : 2.92 M/uL  Hemoglobin : 9.1 g/dL  Hematocrit : 28.3 %  Platelet Count - Automated : 252 K/uL  Mean Cell Volume : 96.9 fl  Mean Cell Hemoglobin : 31.2 pg  Mean Cell Hemoglobin Concentration : 32.2 gm/dL  Auto Neutrophil # : x  Auto Lymphocyte # : x  Auto Monocyte # : x  Auto Eosinophil # : x  Auto Basophil # : x  Auto Neutrophil % : x  Auto Lymphocyte % : x  Auto Monocyte % : x  Auto Eosinophil % : x  Auto Basophil % : x      12-14    130<L>  |  85<L>  |  68<H>  ----------------------------<  248<H>  5.2   |  23  |  7.59<H>    Ca    9.9      14 Dec 2019 07:55  Phos  5.1     12-14  Mg     2.2     12-14        CAPILLARY BLOOD GLUCOSE      POCT Blood Glucose.: 232 mg/dL (14 Dec 2019 16:36)  POCT Blood Glucose.: 122 mg/dL (14 Dec 2019 13:13)  POCT Blood Glucose.: 246 mg/dL (14 Dec 2019 09:44)  POCT Blood Glucose.: 252 mg/dL (14 Dec 2019 08:18)  POCT Blood Glucose.: 285 mg/dL (13 Dec 2019 21:32)      Vital Signs Last 24 Hrs  T(C): 36.4 (14 Dec 2019 12:30), Max: 37.5 (14 Dec 2019 05:08)  T(F): 97.6 (14 Dec 2019 12:30), Max: 99.5 (14 Dec 2019 05:08)  HR: 92 (14 Dec 2019 13:14) (89 - 105)  BP: 109/50 (14 Dec 2019 13:14) (109/50 - 138/51)  BP(mean): --  RR: 16 (14 Dec 2019 13:14) (16 - 18)  SpO2: 97% (14 Dec 2019 13:14) (93% - 100%)    Urinalysis Basic - ( 13 Dec 2019 08:09 )    Color: Yellow / Appearance: Slightly Turbid / S.015 / pH: x  Gluc: x / Ketone: Small  / Bili: Negative / Urobili: Negative   Blood: x / Protein: 500 mg/dL / Nitrite: Negative   Leuk Esterase: Moderate / RBC: 0-2 /HPF / WBC 11-25   Sq Epi: x / Non Sq Epi: Few / Bacteria: Few        PTT - ( 13 Dec 2019 07:11 )  PTT:53.6 sec      PHYSICAL EXAM:    Constitutional: NAD  HEENT: conjunctive   clear   Neck:  No JVD  Respiratory: CTAB  Cardiovascular: S1 and S2  Gastrointestinal: BS+, soft, NT/ND  Extremities: No peripheral edema  Neurological: A/O x 3, no focal deficits  Psychiatric: Normal mood, normal affect  : No Renteria  Skin: dry  Access: pos fistula

## 2019-12-15 LAB
ANION GAP SERPL CALC-SCNC: 20 MMOL/L — HIGH (ref 5–17)
BUN SERPL-MCNC: 44 MG/DL — HIGH (ref 7–23)
CALCIUM SERPL-MCNC: 9.2 MG/DL — SIGNIFICANT CHANGE UP (ref 8.4–10.5)
CHLORIDE SERPL-SCNC: 90 MMOL/L — LOW (ref 96–108)
CO2 SERPL-SCNC: 27 MMOL/L — SIGNIFICANT CHANGE UP (ref 22–31)
CREAT SERPL-MCNC: 5.54 MG/DL — HIGH (ref 0.5–1.3)
GLUCOSE BLDC GLUCOMTR-MCNC: 115 MG/DL — HIGH (ref 70–99)
GLUCOSE BLDC GLUCOMTR-MCNC: 171 MG/DL — HIGH (ref 70–99)
GLUCOSE BLDC GLUCOMTR-MCNC: 184 MG/DL — HIGH (ref 70–99)
GLUCOSE BLDC GLUCOMTR-MCNC: 215 MG/DL — HIGH (ref 70–99)
GLUCOSE BLDC GLUCOMTR-MCNC: 269 MG/DL — HIGH (ref 70–99)
GLUCOSE SERPL-MCNC: 201 MG/DL — HIGH (ref 70–99)
HCT VFR BLD CALC: 28 % — LOW (ref 34.5–45)
HGB BLD-MCNC: 9.1 G/DL — LOW (ref 11.5–15.5)
MCHC RBC-ENTMCNC: 32 PG — SIGNIFICANT CHANGE UP (ref 27–34)
MCHC RBC-ENTMCNC: 32.5 GM/DL — SIGNIFICANT CHANGE UP (ref 32–36)
MCV RBC AUTO: 98.6 FL — SIGNIFICANT CHANGE UP (ref 80–100)
PLATELET # BLD AUTO: 218 K/UL — SIGNIFICANT CHANGE UP (ref 150–400)
POTASSIUM SERPL-MCNC: 4.3 MMOL/L — SIGNIFICANT CHANGE UP (ref 3.5–5.3)
POTASSIUM SERPL-SCNC: 4.3 MMOL/L — SIGNIFICANT CHANGE UP (ref 3.5–5.3)
RBC # BLD: 2.84 M/UL — LOW (ref 3.8–5.2)
RBC # FLD: 15.9 % — HIGH (ref 10.3–14.5)
SODIUM SERPL-SCNC: 137 MMOL/L — SIGNIFICANT CHANGE UP (ref 135–145)
TROPONIN T, HIGH SENSITIVITY RESULT: 2210 NG/L — HIGH (ref 0–51)
TROPONIN T, HIGH SENSITIVITY RESULT: 2226 NG/L — HIGH (ref 0–51)
WBC # BLD: 11.3 K/UL — HIGH (ref 3.8–10.5)
WBC # FLD AUTO: 11.3 K/UL — HIGH (ref 3.8–10.5)

## 2019-12-15 PROCEDURE — 99232 SBSQ HOSP IP/OBS MODERATE 35: CPT

## 2019-12-15 PROCEDURE — 99223 1ST HOSP IP/OBS HIGH 75: CPT | Mod: GC

## 2019-12-15 RX ADMIN — HEPARIN SODIUM 5000 UNIT(S): 5000 INJECTION INTRAVENOUS; SUBCUTANEOUS at 22:35

## 2019-12-15 RX ADMIN — CLOPIDOGREL BISULFATE 75 MILLIGRAM(S): 75 TABLET, FILM COATED ORAL at 11:39

## 2019-12-15 RX ADMIN — Medication 81 MILLIGRAM(S): at 11:37

## 2019-12-15 RX ADMIN — Medication 3: at 12:02

## 2019-12-15 RX ADMIN — Medication 1 TABLET(S): at 11:37

## 2019-12-15 RX ADMIN — Medication 6 UNIT(S): at 17:23

## 2019-12-15 RX ADMIN — PANTOPRAZOLE SODIUM 40 MILLIGRAM(S): 20 TABLET, DELAYED RELEASE ORAL at 06:16

## 2019-12-15 RX ADMIN — Medication 667 MILLIGRAM(S): at 17:23

## 2019-12-15 RX ADMIN — INSULIN GLARGINE 10 UNIT(S): 100 INJECTION, SOLUTION SUBCUTANEOUS at 08:16

## 2019-12-15 RX ADMIN — Medication 50 MILLIGRAM(S): at 11:37

## 2019-12-15 RX ADMIN — Medication 6 UNIT(S): at 08:16

## 2019-12-15 RX ADMIN — Medication 100 MILLIGRAM(S): at 05:09

## 2019-12-15 RX ADMIN — Medication 1: at 08:15

## 2019-12-15 RX ADMIN — INSULIN GLARGINE 15 UNIT(S): 100 INJECTION, SOLUTION SUBCUTANEOUS at 22:35

## 2019-12-15 RX ADMIN — INSULIN GLARGINE 15 UNIT(S): 100 INJECTION, SOLUTION SUBCUTANEOUS at 01:02

## 2019-12-15 RX ADMIN — Medication 6 UNIT(S): at 12:02

## 2019-12-15 RX ADMIN — HEPARIN SODIUM 5000 UNIT(S): 5000 INJECTION INTRAVENOUS; SUBCUTANEOUS at 05:09

## 2019-12-15 RX ADMIN — ATORVASTATIN CALCIUM 40 MILLIGRAM(S): 80 TABLET, FILM COATED ORAL at 22:35

## 2019-12-15 RX ADMIN — Medication 0.1 MILLIGRAM(S): at 22:35

## 2019-12-15 RX ADMIN — AMLODIPINE BESYLATE 7.5 MILLIGRAM(S): 2.5 TABLET ORAL at 05:09

## 2019-12-15 RX ADMIN — Medication 667 MILLIGRAM(S): at 11:38

## 2019-12-15 NOTE — PROGRESS NOTE ADULT - ASSESSMENT
70yo  female with PMH Hepatitis A, HTN, HLD, MI, CAD s/p CANDE x 2 at [facility], moderate MR, PAD, depression/anxiety, DM2 A1C 9.0, ESRD on HD for past 1.5 years via AV fistula last treatment 12/12/19 managed by Dr. Perez at [facility], former smoker 100 pack years who presented to [facility] 12/12/19 with complaints of chest tightness/throat tightness and SOB at rest that lasted 4 hours prior to seeking medical attention.  Denied dizziness, diaphoresis, palpitations, nausea, vomiting, peripheral edema, recent weight gain, or syncope. No implanted monitoring deices. Pt. states she has had lightheadedness/dizziness/near syncope at last three episodes of dialysis. Cardiac biomarkers positive 12.8>15.6.  Pt. started on Heparin ACS.  CT chest 12/12/19 pulmonary edema and small bilateral  pleural effusions.  CT head 12/12/19 Moderate cerebral parenchymal volume loss and chronic ischemic white matter changes. No intra-axial or extra-axial hemorrhage edema, territorial infarct or mass effect. Clear sinuses. Cranium intact. TTE 5/1/18 EF 30% inferior, posterior and lateral walls are severely hypokinetic to akinetic, basal inferior wall thinned and aneurysmal, apex is hypokinetic, as is the distal anteroseptum.  D dimer 12/12/19 274.  WBC 16.8---15.6.  Pt. transferred asymptomatic for further evaluation and cardiac cath. Pt. with current [facility] documentation 12/12/19 DNR.

## 2019-12-15 NOTE — PROGRESS NOTE ADULT - SUBJECTIVE AND OBJECTIVE BOX
SUBJECTIVE / OVERNIGHT EVENTS: pt denies chest pain, shortness of breath, nausea,v   want to go home,    MEDICATIONS  (STANDING):  amLODIPine   Tablet 7.5 milliGRAM(s) Oral daily  aspirin enteric coated 81 milliGRAM(s) Oral daily  atorvastatin 40 milliGRAM(s) Oral at bedtime  calcium acetate 667 milliGRAM(s) Oral three times a day with meals  cloNIDine 0.1 milliGRAM(s) Oral at bedtime  clopidogrel Tablet 75 milliGRAM(s) Oral daily  dextrose 5%. 1000 milliLiter(s) (50 mL/Hr) IV Continuous <Continuous>  dextrose 5%. 1000 milliLiter(s) (50 mL/Hr) IV Continuous <Continuous>  dextrose 50% Injectable 12.5 Gram(s) IV Push once  dextrose 50% Injectable 25 Gram(s) IV Push once  dextrose 50% Injectable 25 Gram(s) IV Push once  dextrose 50% Injectable 12.5 Gram(s) IV Push once  dextrose 50% Injectable 25 Gram(s) IV Push once  dextrose 50% Injectable 25 Gram(s) IV Push once  heparin  Injectable 5000 Unit(s) SubCutaneous every 8 hours  imipramine 50 milliGRAM(s) Oral daily  insulin glargine Injectable (LANTUS) 10 Unit(s) SubCutaneous every morning  insulin glargine Injectable (LANTUS) 15 Unit(s) SubCutaneous at bedtime  insulin lispro (HumaLOG) corrective regimen sliding scale   SubCutaneous three times a day before meals  insulin lispro (HumaLOG) corrective regimen sliding scale   SubCutaneous at bedtime  insulin lispro Injectable (HumaLOG) 6 Unit(s) SubCutaneous three times a day before meals  metoprolol succinate  milliGRAM(s) Oral daily  multivitamin 1 Tablet(s) Oral daily  pantoprazole    Tablet 40 milliGRAM(s) Oral before breakfast    MEDICATIONS  (PRN):  dextrose 40% Gel 15 Gram(s) Oral once PRN Blood Glucose LESS THAN 70 milliGRAM(s)/deciLiter  dextrose 40% Gel 15 Gram(s) Oral once PRN Blood Glucose LESS THAN 70 milliGRAM(s)/deciliter  glucagon  Injectable 1 milliGRAM(s) IntraMuscular once PRN Glucose LESS THAN 70 milligrams/deciliter  glucagon  Injectable 1 milliGRAM(s) IntraMuscular once PRN Glucose <70 milliGRAM(s)/deciLiter    Vital Signs Last 24 Hrs  T(C): 36.8 (15 Dec 2019 21:01), Max: 37.1 (15 Dec 2019 04:10)  T(F): 98.3 (15 Dec 2019 21:01), Max: 98.8 (15 Dec 2019 04:10)  HR: 86 (15 Dec 2019 21:01) (83 - 86)  BP: 133/75 (15 Dec 2019 21:01) (109/61 - 133/75)  BP(mean): --  RR: 18 (15 Dec 2019 21:) (18 - 19)  SpO2: 96% (15 Dec 2019 21:) (93% - 96%)    Constitutional: No fever, fatigue  Skin: No rash.  Eyes: No recent vision problems or eye pain.  ENT: No congestion, ear pain, or sore throat.  Cardiovascular: No chest pain or palpation.  Respiratory: No cough, shortness of breath, congestion, or wheezing.  Gastrointestinal: No abdominal pain, nausea, vomiting, or diarrhea.  Genitourinary: No dysuria.  Musculoskeletal: No joint swelling.  Neurologic: No headache.    PHYSICAL EXAM:  GENERAL: NAD  EYES: EOMI, PERRLA  NECK: Supple, No JVD  CHEST/LUNG: dec breath sounds at bases   HEART:  S1 , S2 +  ABDOMEN: soft , bs+  EXTREMITIES:  trace edema  NEUROLOGY:alert awake     LABS:  12-15    137  |  90<L>  |  44<H>  ----------------------------<  201<H>  4.3   |  27  |  5.54<H>    Ca    9.2      15 Dec 2019 06:11  Phos  5.1     12-14  Mg     2.2     12-14      Creatinine Trend: 5.54 <--, 7.59 <--, 6.00 <--, 4.10 <--, 5.90 <--                        9.1    11.30 )-----------( 218      ( 15 Dec 2019 09:07 )             28.0     Urine Studies:  Urinalysis Basic - ( 13 Dec 2019 08:09 )    Color: Yellow / Appearance: Slightly Turbid / S.015 / pH:   Gluc:  / Ketone: Small  / Bili: Negative / Urobili: Negative   Blood:  / Protein: 500 mg/dL / Nitrite: Negative   Leuk Esterase: Moderate / RBC: 0-2 /HPF / WBC 11-25   Sq Epi:  / Non Sq Epi: Few / Bacteria: Few                  Color: Yellow / Appearance: Slightly Turbid / S.015 / pH: x  Gluc: x / Ketone: Small  / Bili: Negative / Urobili: Negative   Blood: x / Protein: 500 mg/dL / Nitrite: Negative   Leuk Esterase: Moderate / RBC: 0-2 /HPF / WBC 11-25   Sq Epi: x / Non Sq Epi: Few / Bacteria: Few        RADIOLOGY & ADDITIONAL TESTS:    Imaging Personally Reviewed:    Consultant(s) Notes Reviewed:      Care Discussed with Consultants/Other Providers:

## 2019-12-15 NOTE — PROGRESS NOTE ADULT - SUBJECTIVE AND OBJECTIVE BOX
· Subjective and Objective:   [facility] CARDIOLOGY CONSULTANTS:    Lorraine Gruber, Dee Larry, Joseph Carson Savella, [place]      [phone]    CHIEF COMPLAINT: Patient is a 69y old  Female who presents with a chief complaint of catheterization (14 Dec 2019 11:54)    Pt laying in bed, NAD, no overnight events.  She denies CP, SOB, palps    TELEMETRY: NSR     REVIEW OF SYSTEMS otherwise negative unless noted      PAST MEDICAL & SURGICAL HISTORY:  Dialysis patient  CRF (chronic renal failure), unspecified stage  h/o Smoking: quitted 3/2012  Murmur, cardiac  PAD (peripheral artery disease)  h/o Hepatitis A 1969: currently resolved, no symptoms  CAD (coronary artery disease)  h/o Myocardial infarct   Depression  h/o Anxiety attack  HTN (hypertension)  Diabetes mellitus II  s/p surgical removal of benign Skin lesion epigastric area  s/p Ovarian cyst removal  coronary stent       MEDICATIONS  (STANDING):  amLODIPine   Tablet 7.5 milliGRAM(s) Oral daily  aspirin enteric coated 81 milliGRAM(s) Oral daily  atorvastatin 40 milliGRAM(s) Oral at bedtime  calcium acetate 667 milliGRAM(s) Oral three times a day with meals  cloNIDine 0.1 milliGRAM(s) Oral at bedtime  clopidogrel Tablet 75 milliGRAM(s) Oral daily  dextrose 5%. 1000 milliLiter(s) (50 mL/Hr) IV Continuous <Continuous>  dextrose 5%. 1000 milliLiter(s) (50 mL/Hr) IV Continuous <Continuous>  dextrose 50% Injectable 12.5 Gram(s) IV Push once  dextrose 50% Injectable 25 Gram(s) IV Push once  dextrose 50% Injectable 25 Gram(s) IV Push once  dextrose 50% Injectable 12.5 Gram(s) IV Push once  dextrose 50% Injectable 25 Gram(s) IV Push once  dextrose 50% Injectable 25 Gram(s) IV Push once  heparin  Injectable 5000 Unit(s) SubCutaneous every 8 hours  imipramine 50 milliGRAM(s) Oral daily  insulin glargine Injectable (LANTUS) 10 Unit(s) SubCutaneous every morning  insulin glargine Injectable (LANTUS) 15 Unit(s) SubCutaneous at bedtime  insulin lispro (HumaLOG) corrective regimen sliding scale   SubCutaneous three times a day before meals  insulin lispro (HumaLOG) corrective regimen sliding scale   SubCutaneous at bedtime  insulin lispro Injectable (HumaLOG) 6 Unit(s) SubCutaneous three times a day before meals  metoprolol succinate  milliGRAM(s) Oral daily  multivitamin 1 Tablet(s) Oral daily  pantoprazole    Tablet 40 milliGRAM(s) Oral before breakfast      Allergies    latex (Unknown)  No Known Drug Allergies    Intolerances                              9.1    11.30 )-----------( 218      ( 15 Dec 2019 09:07 )             28.0       12-15    137  |  90<L>  |  44<H>  ----------------------------<  201<H>  4.3   |  27  |  5.54<H>    Ca    9.2      15 Dec 2019 06:11  Phos  5.1     12-14  Mg     2.2     12-14                                  Daily     Daily Weight in k.9 (14 Dec 2019 12:30)    I&O's Summary    14 Dec 2019 07:01  -  15 Dec 2019 07:00  --------------------------------------------------------  IN: 420 mL / OUT: 2000 mL / NET: -1580 mL        Vital Signs Last 24 Hrs  T(C): 37.1 (15 Dec 2019 04:10), Max: 37.1 (15 Dec 2019 04:10)  T(F): 98.8 (15 Dec 2019 04:10), Max: 98.8 (15 Dec 2019 04:10)  HR: 84 (15 Dec 2019 04:10) (84 - 92)  BP: 109/61 (15 Dec 2019 04:10) (109/50 - 118/58)  BP(mean): --  RR: 18 (15 Dec 2019 04:10) (16 - 18)  SpO2: 93% (15 Dec 2019 04:10) (93% - 97%)    PHYSICAL EXAM:   · Constitutional	Well-developed, well nourished  · Eyes	EOMI; PERRL; no drainage or redness  · ENMT	No oral lesions; no gross abnormalities  · Neck	No bruits; no thyromegaly or nodules  · Respiratory	Normal breath sounds b/l, No RRW  · Cardiovascular	Regular rate & rhythm, normal S1, S2; no murmurs, gallops or rubs; no S3, S4  · Gastrointestinal	Soft, non-tender, no hepatosplenomegaly, normal bowel sounds  · Extremities	No cyanosis, clubbing or edema  · Vascular	Equal and normal pulses (carotid, femoral, dorsalis pedis)  · Neurological	Alert & oriented; no sensory, motor or coordination deficits, normal reflexes

## 2019-12-15 NOTE — PROGRESS NOTE ADULT - SUBJECTIVE AND OBJECTIVE BOX
Patient is a 69y Female whom presented to the hospital with esrd on hd   pateint seen and examined nad , no fever , no chills    PAST MEDICAL & SURGICAL HISTORY:  Dialysis patient  CRF (chronic renal failure), unspecified stage  h/o Smoking: quitted 3/2012  Murmur, cardiac  PAD (peripheral artery disease)  h/o Hepatitis A 1969: currently resolved, no symptoms  CAD (coronary artery disease)  h/o Myocardial infarct 2007  Depression  h/o Anxiety attack  HTN (hypertension)  Diabetes mellitus II  s/p surgical removal of benign Skin lesion epigastric area  s/p Ovarian cyst removal  coronary stent 2007      MEDICATIONS  (STANDING):  amLODIPine   Tablet 7.5 milliGRAM(s) Oral daily  aspirin enteric coated 81 milliGRAM(s) Oral daily  atorvastatin 40 milliGRAM(s) Oral at bedtime  cloNIDine 0.1 milliGRAM(s) Oral at bedtime  clopidogrel Tablet 75 milliGRAM(s) Oral daily  dextrose 5%. 1000 milliLiter(s) (50 mL/Hr) IV Continuous <Continuous>  dextrose 5%. 1000 milliLiter(s) (50 mL/Hr) IV Continuous <Continuous>  dextrose 50% Injectable 12.5 Gram(s) IV Push once  dextrose 50% Injectable 25 Gram(s) IV Push once  dextrose 50% Injectable 25 Gram(s) IV Push once  dextrose 50% Injectable 12.5 Gram(s) IV Push once  dextrose 50% Injectable 25 Gram(s) IV Push once  dextrose 50% Injectable 25 Gram(s) IV Push once  imipramine 50 milliGRAM(s) Oral daily  insulin glargine Injectable (LANTUS) 8 Unit(s) SubCutaneous every morning  insulin glargine Injectable (LANTUS) 12 Unit(s) SubCutaneous at bedtime  insulin glargine Injectable (LANTUS) 18 Unit(s) SubCutaneous at bedtime  insulin lispro (HumaLOG) corrective regimen sliding scale   SubCutaneous three times a day before meals  insulin lispro (HumaLOG) corrective regimen sliding scale   SubCutaneous at bedtime  insulin lispro (HumaLOG) corrective regimen sliding scale   SubCutaneous three times a day before meals  insulin lispro (HumaLOG) corrective regimen sliding scale   SubCutaneous at bedtime  insulin lispro Injectable (HumaLOG) 6 Unit(s) SubCutaneous three times a day before meals  metoprolol succinate  milliGRAM(s) Oral daily  multivitamin 1 Tablet(s) Oral daily  pantoprazole    Tablet 40 milliGRAM(s) Oral before breakfast      Allergies    latex (Unknown)  No Known Drug Allergies    Intolerances        SOCIAL HISTORY:  Denies ETOh,Smoking,     FAMILY HISTORY:  No pertinent family history in first degree relatives      REVIEW OF SYSTEMS:    CONSTITUTIONAL: No weakness, fevers or chills  EYES/ENT: No visual changes;  no throat pain   NECK: No pain or stiffness  RESPIRATORY: No cough, wheezing, hemoptysis; No shortness of breath  CARDIOVASCULAR: No chest pain or palpitations  GASTROINTESTINAL: No abdominal or epigastric pain. No nausea, vomiting,     No diarrhea or constipation. No melena   GENITOURINARY: No dysuria, frequency or hematuria  NEUROLOGICAL: No numbness or weakness  SKIN: dry                                 9.1    11.30 )-----------( 218      ( 15 Dec 2019 09:07 )             28.0       CBC Full  -  ( 15 Dec 2019 09:07 )  WBC Count : 11.30 K/uL  RBC Count : 2.84 M/uL  Hemoglobin : 9.1 g/dL  Hematocrit : 28.0 %  Platelet Count - Automated : 218 K/uL  Mean Cell Volume : 98.6 fl  Mean Cell Hemoglobin : 32.0 pg  Mean Cell Hemoglobin Concentration : 32.5 gm/dL  Auto Neutrophil # : x  Auto Lymphocyte # : x  Auto Monocyte # : x  Auto Eosinophil # : x  Auto Basophil # : x  Auto Neutrophil % : x  Auto Lymphocyte % : x  Auto Monocyte % : x  Auto Eosinophil % : x  Auto Basophil % : x      12-15    137  |  90<L>  |  44<H>  ----------------------------<  201<H>  4.3   |  27  |  5.54<H>    Ca    9.2      15 Dec 2019 06:11  Phos  5.1     12-14  Mg     2.2     12-14        CAPILLARY BLOOD GLUCOSE      POCT Blood Glucose.: 269 mg/dL (15 Dec 2019 11:39)  POCT Blood Glucose.: 171 mg/dL (15 Dec 2019 07:52)  POCT Blood Glucose.: 184 mg/dL (15 Dec 2019 00:39)  POCT Blood Glucose.: 159 mg/dL (14 Dec 2019 21:32)  POCT Blood Glucose.: 232 mg/dL (14 Dec 2019 16:36)      Vital Signs Last 24 Hrs  T(C): 36.8 (15 Dec 2019 11:24), Max: 37.1 (15 Dec 2019 04:10)  T(F): 98.3 (15 Dec 2019 11:24), Max: 98.8 (15 Dec 2019 04:10)  HR: 83 (15 Dec 2019 11:24) (83 - 91)  BP: 127/71 (15 Dec 2019 11:24) (109/61 - 127/71)  BP(mean): --  RR: 19 (15 Dec 2019 11:24) (18 - 19)  SpO2: 93% (15 Dec 2019 11:24) (93% - 96%)          PHYSICAL EXAM:    Constitutional: NAD  HEENT: conjunctive   clear   Neck:  No JVD  Respiratory: CTAB  Cardiovascular: S1 and S2  Gastrointestinal: BS+, soft, NT/ND  Extremities: No peripheral edema  Neurological: A/O x 3, no focal deficits  Psychiatric: Normal mood, normal affect  : No Renteria  Skin: dry  Access: pos fistula

## 2019-12-15 NOTE — PROGRESS NOTE ADULT - ASSESSMENT
68yo  female with PMH Hepatitis A, HTN, HLD, MI, CAD s/p 1V CABG 01/2019 and CANDE x 2 at [facility], moderate MR, PAD, depression/anxiety, DM2 A1C 9.0, ESRD on HD for past 1.5 years via AV fistula, former smoker who presented to [facility] 12/12/19 with complaints of chest tightness/throat tightness and SOB at rest that lasted 4 hours prior to seeking medical attention.   Cardiac biomarkers positive 12.8 -->15.6.  Pt. started on Heparin ACS.  CT chest 12/12/19 pulmonary edema and small bilateral  pleural effusions.    TTE 5/1/18 EF 30% inferior, posterior and lateral walls are severely hypokinetic to akinetic, basal inferior wall thinned and aneurysmal, apex is hypokinetic, as is the distal anteroseptum.    Cardiac cath showed 100% LAD with patent LIMA to LAD, RCA 30%    - clear enzyme elevation - will repeat  - repeat TTE  - c/w DAPT  - c/w statin  - c/w metoprolol, norvasc, clonidine  - add ACEi for decreased LV function  - pt appears euvolemic  - HD as per renal  - Will follow along with you

## 2019-12-16 ENCOUNTER — TRANSCRIPTION ENCOUNTER (OUTPATIENT)
Age: 69
End: 2019-12-16

## 2019-12-16 VITALS
SYSTOLIC BLOOD PRESSURE: 124 MMHG | OXYGEN SATURATION: 100 % | HEART RATE: 85 BPM | TEMPERATURE: 98 F | DIASTOLIC BLOOD PRESSURE: 69 MMHG | RESPIRATION RATE: 18 BRPM

## 2019-12-16 LAB
ANION GAP SERPL CALC-SCNC: 17 MMOL/L — SIGNIFICANT CHANGE UP (ref 5–17)
BUN SERPL-MCNC: 59 MG/DL — HIGH (ref 7–23)
CALCIUM SERPL-MCNC: 9.5 MG/DL — SIGNIFICANT CHANGE UP (ref 8.4–10.5)
CHLORIDE SERPL-SCNC: 93 MMOL/L — LOW (ref 96–108)
CO2 SERPL-SCNC: 27 MMOL/L — SIGNIFICANT CHANGE UP (ref 22–31)
CREAT SERPL-MCNC: 7.47 MG/DL — HIGH (ref 0.5–1.3)
GLUCOSE BLDC GLUCOMTR-MCNC: 146 MG/DL — HIGH (ref 70–99)
GLUCOSE BLDC GLUCOMTR-MCNC: 150 MG/DL — HIGH (ref 70–99)
GLUCOSE BLDC GLUCOMTR-MCNC: 221 MG/DL — HIGH (ref 70–99)
GLUCOSE SERPL-MCNC: 163 MG/DL — HIGH (ref 70–99)
HCT VFR BLD CALC: 26.5 % — LOW (ref 34.5–45)
HGB BLD-MCNC: 8.6 G/DL — LOW (ref 11.5–15.5)
MCHC RBC-ENTMCNC: 32.1 PG — SIGNIFICANT CHANGE UP (ref 27–34)
MCHC RBC-ENTMCNC: 32.5 GM/DL — SIGNIFICANT CHANGE UP (ref 32–36)
MCV RBC AUTO: 98.9 FL — SIGNIFICANT CHANGE UP (ref 80–100)
PLATELET # BLD AUTO: 237 K/UL — SIGNIFICANT CHANGE UP (ref 150–400)
POTASSIUM SERPL-MCNC: 4.7 MMOL/L — SIGNIFICANT CHANGE UP (ref 3.5–5.3)
POTASSIUM SERPL-SCNC: 4.7 MMOL/L — SIGNIFICANT CHANGE UP (ref 3.5–5.3)
RBC # BLD: 2.68 M/UL — LOW (ref 3.8–5.2)
RBC # FLD: 16 % — HIGH (ref 10.3–14.5)
SODIUM SERPL-SCNC: 137 MMOL/L — SIGNIFICANT CHANGE UP (ref 135–145)
TROPONIN T, HIGH SENSITIVITY RESULT: 2301 NG/L — HIGH (ref 0–51)
TROPONIN T, HIGH SENSITIVITY RESULT: 2303 NG/L — HIGH (ref 0–51)
WBC # BLD: 11.94 K/UL — HIGH (ref 3.8–10.5)
WBC # FLD AUTO: 11.94 K/UL — HIGH (ref 3.8–10.5)

## 2019-12-16 PROCEDURE — 93005 ELECTROCARDIOGRAM TRACING: CPT

## 2019-12-16 PROCEDURE — C1887: CPT

## 2019-12-16 PROCEDURE — C1894: CPT

## 2019-12-16 PROCEDURE — 84484 ASSAY OF TROPONIN QUANT: CPT

## 2019-12-16 PROCEDURE — C1769: CPT

## 2019-12-16 PROCEDURE — 80048 BASIC METABOLIC PNL TOTAL CA: CPT

## 2019-12-16 PROCEDURE — 84100 ASSAY OF PHOSPHORUS: CPT

## 2019-12-16 PROCEDURE — 82962 GLUCOSE BLOOD TEST: CPT

## 2019-12-16 PROCEDURE — 83735 ASSAY OF MAGNESIUM: CPT

## 2019-12-16 PROCEDURE — 85027 COMPLETE CBC AUTOMATED: CPT

## 2019-12-16 PROCEDURE — 93306 TTE W/DOPPLER COMPLETE: CPT

## 2019-12-16 PROCEDURE — 93306 TTE W/DOPPLER COMPLETE: CPT | Mod: 26

## 2019-12-16 PROCEDURE — 99261: CPT

## 2019-12-16 PROCEDURE — 93454 CORONARY ARTERY ANGIO S&I: CPT

## 2019-12-16 PROCEDURE — 99232 SBSQ HOSP IP/OBS MODERATE 35: CPT

## 2019-12-16 PROCEDURE — 99152 MOD SED SAME PHYS/QHP 5/>YRS: CPT

## 2019-12-16 RX ORDER — INSULIN LISPRO 100/ML
8 VIAL (ML) SUBCUTANEOUS
Refills: 0 | Status: DISCONTINUED | OUTPATIENT
Start: 2019-12-16 | End: 2019-12-16

## 2019-12-16 RX ORDER — CALCIUM ACETATE 667 MG
667 TABLET ORAL
Qty: 60030 | Refills: 0
Start: 2019-12-16 | End: 2020-01-14

## 2019-12-16 RX ORDER — INSULIN LISPRO 100/ML
8 VIAL (ML) SUBCUTANEOUS
Qty: 1 | Refills: 0
Start: 2019-12-16 | End: 2020-01-14

## 2019-12-16 RX ADMIN — Medication 81 MILLIGRAM(S): at 12:03

## 2019-12-16 RX ADMIN — Medication 6 UNIT(S): at 12:03

## 2019-12-16 RX ADMIN — INSULIN GLARGINE 10 UNIT(S): 100 INJECTION, SOLUTION SUBCUTANEOUS at 08:25

## 2019-12-16 RX ADMIN — Medication 6 UNIT(S): at 08:18

## 2019-12-16 RX ADMIN — AMLODIPINE BESYLATE 7.5 MILLIGRAM(S): 2.5 TABLET ORAL at 06:07

## 2019-12-16 RX ADMIN — HEPARIN SODIUM 5000 UNIT(S): 5000 INJECTION INTRAVENOUS; SUBCUTANEOUS at 06:07

## 2019-12-16 RX ADMIN — Medication 2: at 12:04

## 2019-12-16 RX ADMIN — PANTOPRAZOLE SODIUM 40 MILLIGRAM(S): 20 TABLET, DELAYED RELEASE ORAL at 06:07

## 2019-12-16 RX ADMIN — Medication 667 MILLIGRAM(S): at 08:25

## 2019-12-16 RX ADMIN — CLOPIDOGREL BISULFATE 75 MILLIGRAM(S): 75 TABLET, FILM COATED ORAL at 12:03

## 2019-12-16 RX ADMIN — Medication 100 MILLIGRAM(S): at 06:07

## 2019-12-16 RX ADMIN — Medication 1 TABLET(S): at 12:03

## 2019-12-16 RX ADMIN — Medication 50 MILLIGRAM(S): at 12:03

## 2019-12-16 RX ADMIN — Medication 667 MILLIGRAM(S): at 12:03

## 2019-12-16 RX ADMIN — HEPARIN SODIUM 5000 UNIT(S): 5000 INJECTION INTRAVENOUS; SUBCUTANEOUS at 15:35

## 2019-12-16 NOTE — DISCHARGE NOTE NURSING/CASE MANAGEMENT/SOCIAL WORK - NSDCFUADDAPPT_GEN_ALL_CORE_FT
Follow-up with your Cardiologist (Dr. Rios at [facility]) within 1-2 days of discharge

## 2019-12-16 NOTE — DISCHARGE NOTE PROVIDER - NSDCMRMEDTOKEN_GEN_ALL_CORE_FT
amLODIPine 5 mg oral tablet: 1.5 tab(s) orally once a day  home  aspirin 81 mg oral delayed release tablet: 1 tab(s) orally once a day  home/hosp  atorvastatin 40 mg oral tablet: 1 tab(s) orally once a day (at bedtime)  home/hosp  calcium acetate 667 mg oral tablet: 667 milligram(s) orally 3 times a day   cloNIDine 0.1 mg oral tablet: 1 tab(s) orally once a day (at bedtime)  home/hosp  clopidogrel 75 mg oral tablet: 1 tab(s) orally once a day  home/hosp  epoetin fawad: WITH HD   heparin 100 units/mL-D5% intravenous solution:  intravenous continuous infusion   imipramine 50 mg oral tablet: 1 tab(s) orally once a day  home/hosp  insulin lispro (concentrated) 200 units/mL subcutaneous solution:  subcutaneous CORRECTIVE REGIMEN SLIDING SCALE   hosp  ipratropium-albuterol 0.5 mg-2.5 mg/3 mLinhalation solution: 3 milliliter(s) inhaled every 6 hours  hosp  Lantus Solostar Pen 100 units/mL subcutaneous solution: 10 units in the am  15 units in the pm   home/hosp  metoprolol succinate 100 mg oral tablet, extended release: 1 tab(s) orally once a day  home/hosp  Multiple Vitamins oral tablet: 1 tab(s) orally once a day  home/hosp  Protonix 40 mg oral delayed release tablet: 1 tab(s) orally once a day  hosp amLODIPine 5 mg oral tablet: 1.5 tab(s) orally once a day  home  aspirin 81 mg oral delayed release tablet: 1 tab(s) orally once a day  home/hosp  atorvastatin 40 mg oral tablet: 1 tab(s) orally once a day (at bedtime)  home/hosp  calcium acetate 667 mg oral tablet: 667 milligram(s) orally 3 times a day   cloNIDine 0.1 mg oral tablet: 1 tab(s) orally once a day (at bedtime)  home/hosp  clopidogrel 75 mg oral tablet: 1 tab(s) orally once a day  home/hosp  epoetin fawad: WITH HD   imipramine 50 mg oral tablet: 1 tab(s) orally once a day  home/hosp  insulin lispro (concentrated) 200 units/mL subcutaneous solution:  subcutaneous CORRECTIVE REGIMEN SLIDING SCALE   hosp  Lantus Solostar Pen 100 units/mL subcutaneous solution: 10 units in the am  15 units in the pm   home/hosp  metoprolol succinate 100 mg oral tablet, extended release: 1 tab(s) orally once a day  home/hosp  Multiple Vitamins oral tablet: 1 tab(s) orally once a day  home/hosp  Protonix 40 mg oral delayed release tablet: 1 tab(s) orally once a day  hosp amLODIPine 5 mg oral tablet: 1.5 tab(s) orally once a day  home  aspirin 81 mg oral delayed release tablet: 1 tab(s) orally once a day  home/hosp  atorvastatin 40 mg oral tablet: 1 tab(s) orally once a day (at bedtime)  home/hosp  calcium acetate 667 mg oral tablet: 667 milligram(s) orally 3 times a day   cloNIDine 0.1 mg oral tablet: 1 tab(s) orally once a day (at bedtime)  home/hosp  clopidogrel 75 mg oral tablet: 1 tab(s) orally once a day  home/hosp  epoetin fawad: WITH HD   imipramine 50 mg oral tablet: 1 tab(s) orally once a day  home/hosp  insulin lispro (concentrated) 200 units/mL subcutaneous solution:  subcutaneous CORRECTIVE REGIMEN SLIDING SCALE   hosp  insulin lispro (concentrated) 200 units/mL subcutaneous solution: 8 unit(s) subcutaneous 3 times a day (before meals)   Lantus Solostar Pen 100 units/mL subcutaneous solution: 10 units in the am  15 units in the pm   home/hosp  metoprolol succinate 100 mg oral tablet, extended release: 1 tab(s) orally once a day  home/hosp  Multiple Vitamins oral tablet: 1 tab(s) orally once a day  home/hosp  Protonix 40 mg oral delayed release tablet: 1 tab(s) orally once a day  hosp

## 2019-12-16 NOTE — PROGRESS NOTE ADULT - SUBJECTIVE AND OBJECTIVE BOX
[facility] - Lorraine Gruber, Laron, Dee, Alli, Keith Ruiz  Office Number:  [phone]    Patient resting comfortably in bed in NAD.  Laying flat with no respiratory distress.  No complaints of chest pain, dyspnea, palpitations, PND, or orthopnea.    ROS: negative unless otherwise mentioned.    Telemetry:  sr 80-90    MEDICATIONS  (STANDING):  amLODIPine   Tablet 7.5 milliGRAM(s) Oral daily  aspirin enteric coated 81 milliGRAM(s) Oral daily  atorvastatin 40 milliGRAM(s) Oral at bedtime  calcium acetate 667 milliGRAM(s) Oral three times a day with meals  cloNIDine 0.1 milliGRAM(s) Oral at bedtime  clopidogrel Tablet 75 milliGRAM(s) Oral daily  dextrose 5%. 1000 milliLiter(s) (50 mL/Hr) IV Continuous <Continuous>  dextrose 5%. 1000 milliLiter(s) (50 mL/Hr) IV Continuous <Continuous>  dextrose 50% Injectable 12.5 Gram(s) IV Push once  dextrose 50% Injectable 25 Gram(s) IV Push once  dextrose 50% Injectable 25 Gram(s) IV Push once  dextrose 50% Injectable 12.5 Gram(s) IV Push once  dextrose 50% Injectable 25 Gram(s) IV Push once  dextrose 50% Injectable 25 Gram(s) IV Push once  heparin  Injectable 5000 Unit(s) SubCutaneous every 8 hours  imipramine 50 milliGRAM(s) Oral daily  insulin glargine Injectable (LANTUS) 10 Unit(s) SubCutaneous every morning  insulin glargine Injectable (LANTUS) 15 Unit(s) SubCutaneous at bedtime  insulin lispro (HumaLOG) corrective regimen sliding scale   SubCutaneous three times a day before meals  insulin lispro (HumaLOG) corrective regimen sliding scale   SubCutaneous at bedtime  insulin lispro Injectable (HumaLOG) 6 Unit(s) SubCutaneous three times a day before meals  metoprolol succinate  milliGRAM(s) Oral daily  multivitamin 1 Tablet(s) Oral daily  pantoprazole    Tablet 40 milliGRAM(s) Oral before breakfast    MEDICATIONS  (PRN):  dextrose 40% Gel 15 Gram(s) Oral once PRN Blood Glucose LESS THAN 70 milliGRAM(s)/deciLiter  dextrose 40% Gel 15 Gram(s) Oral once PRN Blood Glucose LESS THAN 70 milliGRAM(s)/deciliter  glucagon  Injectable 1 milliGRAM(s) IntraMuscular once PRN Glucose LESS THAN 70 milligrams/deciliter  glucagon  Injectable 1 milliGRAM(s) IntraMuscular once PRN Glucose <70 milliGRAM(s)/deciLiter      Allergies    latex (Unknown)  No Known Drug Allergies    Intolerances        Vital Signs Last 24 Hrs  T(C): 37 (16 Dec 2019 04:07), Max: 37 (16 Dec 2019 04:07)  T(F): 98.6 (16 Dec 2019 04:07), Max: 98.6 (16 Dec 2019 04:07)  HR: 74 (16 Dec 2019 04:07) (74 - 86)  BP: 107/55 (16 Dec 2019 04:07) (107/55 - 133/75)  BP(mean): --  RR: 18 (16 Dec 2019 04:07) (18 - 19)  SpO2: 94% (16 Dec 2019 04:07) (93% - 96%)    I&O's Summary    15 Dec 2019 07:01  -  16 Dec 2019 07:00  --------------------------------------------------------  IN: 540 mL / OUT: 0 mL / NET: 540 mL        ON EXAM:    General: NAD, awake and alert, oriented x 3  HEENT: Mucous membranes are moist, anicteric  Lungs: Non-labored, breath sounds are clear bilaterally, No wheezing, rales or rhonchi  Cardiovascular: Regular, S1 and S2, no murmurs, rubs, or gallops  Gastrointestinal: Bowel Sounds present, soft, nontender.   Lymph: No peripheral edema. No lymphadenopathy.  Skin: No rashes or ulcers  Psych:  Mood & affect appropriate    LABS: All Labs Reviewed:                        9.1    11.30 )-----------( 218      ( 15 Dec 2019 09:07 )             28.0                         9.1    17.88 )-----------( 252      ( 14 Dec 2019 11:07 )             28.3     16 Dec 2019 05:57    137    |  93     |  59     ----------------------------<  163    4.7     |  27     |  7.47   15 Dec 2019 06:11    137    |  90     |  44     ----------------------------<  201    4.3     |  27     |  5.54   14 Dec 2019 07:55    130    |  85     |  68     ----------------------------<  248    5.2     |  23     |  7.59     Ca    9.5        16 Dec 2019 05:57  Ca    9.2        15 Dec 2019 06:11  Ca    9.9        14 Dec 2019 07:55  Phos  5.1       14 Dec 2019 07:55  Mg     2.2       14 Dec 2019 07:55            Blood Culture: Organism --  Gram Stain Blood -- Gram Stain --  Specimen Source .Urine Clean Catch (Midstream)  Culture-Blood --    Organism --  Gram Stain Blood -- Gram Stain --  Specimen Source .Blood Blood  Culture-Blood --

## 2019-12-16 NOTE — DISCHARGE NOTE NURSING/CASE MANAGEMENT/SOCIAL WORK - PATIENT PORTAL LINK FT
You can access the FollowMyHealth Patient Portal offered by [facility] by registering at the following website: [email or web address]. By joining [employer]’s FollowMyHealth portal, you will also be able to view your health information using other applications (apps) compatible with our system.

## 2019-12-16 NOTE — DISCHARGE NOTE PROVIDER - NSDCFUADDAPPT_GEN_ALL_CORE_FT
Follow-up with your Cardiologist (Dr. Rios at [facility]) within 1-2 days of discharge

## 2019-12-16 NOTE — PROGRESS NOTE ADULT - PROBLEM SELECTOR PLAN 2
iss
iss
,obtain ECHO to evaluate LVEF,cardiology consult,continue current managmnet,O2 supply,anticoagulation plan as per cardiology consult

## 2019-12-16 NOTE — DISCHARGE NOTE PROVIDER - CARE PROVIDER_API CALL
Joseph Parker)  Cardiovascular Disease; Internal Medicine  [address], [place] [id number]  Phone: [phone]  Fax: [phone]  Follow Up Time: 1 week    Pahlavan, Mohsen (MD)  Nephrology  [address], [address]  [place] [address]  Phone: [phone]  Fax: [phone]  Follow Up Time:

## 2019-12-16 NOTE — PROGRESS NOTE ADULT - SUBJECTIVE AND OBJECTIVE BOX
Patient is a 69y Female whom presented to the hospital with esrd on hd   pateint seen and examined nad , no fever , no chills    PAST MEDICAL & SURGICAL HISTORY:  Dialysis patient  CRF (chronic renal failure), unspecified stage  h/o Smoking: quitted 3/2012  Murmur, cardiac  PAD (peripheral artery disease)  h/o Hepatitis A 1969: currently resolved, no symptoms  CAD (coronary artery disease)  h/o Myocardial infarct 2007  Depression  h/o Anxiety attack  HTN (hypertension)  Diabetes mellitus II  s/p surgical removal of benign Skin lesion epigastric area  s/p Ovarian cyst removal  coronary stent 2007      MEDICATIONS  (STANDING):  amLODIPine   Tablet 7.5 milliGRAM(s) Oral daily  aspirin enteric coated 81 milliGRAM(s) Oral daily  atorvastatin 40 milliGRAM(s) Oral at bedtime  cloNIDine 0.1 milliGRAM(s) Oral at bedtime  clopidogrel Tablet 75 milliGRAM(s) Oral daily  dextrose 5%. 1000 milliLiter(s) (50 mL/Hr) IV Continuous <Continuous>  dextrose 5%. 1000 milliLiter(s) (50 mL/Hr) IV Continuous <Continuous>  dextrose 50% Injectable 12.5 Gram(s) IV Push once  dextrose 50% Injectable 25 Gram(s) IV Push once  dextrose 50% Injectable 25 Gram(s) IV Push once  dextrose 50% Injectable 12.5 Gram(s) IV Push once  dextrose 50% Injectable 25 Gram(s) IV Push once  dextrose 50% Injectable 25 Gram(s) IV Push once  imipramine 50 milliGRAM(s) Oral daily  insulin glargine Injectable (LANTUS) 8 Unit(s) SubCutaneous every morning  insulin glargine Injectable (LANTUS) 12 Unit(s) SubCutaneous at bedtime  insulin glargine Injectable (LANTUS) 18 Unit(s) SubCutaneous at bedtime  insulin lispro (HumaLOG) corrective regimen sliding scale   SubCutaneous three times a day before meals  insulin lispro (HumaLOG) corrective regimen sliding scale   SubCutaneous at bedtime  insulin lispro (HumaLOG) corrective regimen sliding scale   SubCutaneous three times a day before meals  insulin lispro (HumaLOG) corrective regimen sliding scale   SubCutaneous at bedtime  insulin lispro Injectable (HumaLOG) 6 Unit(s) SubCutaneous three times a day before meals  metoprolol succinate  milliGRAM(s) Oral daily  multivitamin 1 Tablet(s) Oral daily  pantoprazole    Tablet 40 milliGRAM(s) Oral before breakfast      Allergies    latex (Unknown)  No Known Drug Allergies    Intolerances        SOCIAL HISTORY:  Denies ETOh,Smoking,     FAMILY HISTORY:  No pertinent family history in first degree relatives      REVIEW OF SYSTEMS:    CONSTITUTIONAL: No weakness, fevers or chills  EYES/ENT: No visual changes;  no throat pain   NECK: No pain or stiffness  RESPIRATORY: No cough, wheezing, hemoptysis; No shortness of breath  CARDIOVASCULAR: No chest pain or palpitations  GASTROINTESTINAL: No abdominal or epigastric pain. No nausea, vomiting,     No diarrhea or constipation. No melena   GENITOURINARY: No dysuria, frequency or hematuria  NEUROLOGICAL: No numbness or weakness  SKIN: dry                             8.6    11.94 )-----------( 237      ( 16 Dec 2019 08:22 )             26.5       CBC Full  -  ( 16 Dec 2019 08:22 )  WBC Count : 11.94 K/uL  RBC Count : 2.68 M/uL  Hemoglobin : 8.6 g/dL  Hematocrit : 26.5 %  Platelet Count - Automated : 237 K/uL  Mean Cell Volume : 98.9 fl  Mean Cell Hemoglobin : 32.1 pg  Mean Cell Hemoglobin Concentration : 32.5 gm/dL  Auto Neutrophil # : x  Auto Lymphocyte # : x  Auto Monocyte # : x  Auto Eosinophil # : x  Auto Basophil # : x  Auto Neutrophil % : x  Auto Lymphocyte % : x  Auto Monocyte % : x  Auto Eosinophil % : x  Auto Basophil % : x      12-16    137  |  93<L>  |  59<H>  ----------------------------<  163<H>  4.7   |  27  |  7.47<H>    Ca    9.5      16 Dec 2019 05:57        CAPILLARY BLOOD GLUCOSE      POCT Blood Glucose.: 146 mg/dL (16 Dec 2019 16:39)  POCT Blood Glucose.: 221 mg/dL (16 Dec 2019 12:00)  POCT Blood Glucose.: 150 mg/dL (16 Dec 2019 08:08)  POCT Blood Glucose.: 215 mg/dL (15 Dec 2019 21:35)      Vital Signs Last 24 Hrs  T(C): 36.8 (16 Dec 2019 12:31), Max: 37 (16 Dec 2019 04:07)  T(F): 98.2 (16 Dec 2019 12:31), Max: 98.6 (16 Dec 2019 04:07)  HR: 85 (16 Dec 2019 12:31) (74 - 86)  BP: 124/69 (16 Dec 2019 12:31) (107/55 - 133/75)  BP(mean): --  RR: 18 (16 Dec 2019 12:31) (18 - 18)  SpO2: 100% (16 Dec 2019 12:31) (94% - 100%)              PHYSICAL EXAM:    Constitutional: NAD  HEENT: conjunctive   clear   Neck:  No JVD  Respiratory: CTAB  Cardiovascular: S1 and S2  Gastrointestinal: BS+, soft, NT/ND  Extremities: No peripheral edema  Neurological: A/O x 3, no focal deficits  Psychiatric: Normal mood, normal affect  : No Renteria  Skin: dry  Access: pos fistula

## 2019-12-16 NOTE — DISCHARGE NOTE PROVIDER - HOSPITAL COURSE
68yo  female with Hepatitis A, HTN, HLD, MI, CAD s/p 1V CABG 01/2019 and CANDE x 2 at [facility], moderate MR, PAD, depression/anxiety, DM2 A1C 9.0, ESRD on HD for past 1.5 years via AV fistula, former smoker who presented to [facility] 12/12/19 with complaints of chest tightness/throat tightness and SOB at rest that lasted 4 hours prior to seeking medical attention.   Cardiac biomarkers positive 12.8 -->15.6.  Pt started on Heparin ACS.  CT chest 12/12/19 pulmonary edema and small bilateral  pleural effusions.      TTE 5/1/18 EF 30% inferior, posterior and lateral walls are severely hypokinetic to akinetic, basal inferior wall thinned and aneurysmal, apex is hypokinetic, as is the distal carrie septum.      Cardiac cath showed 100% LAD with patent LIMA to LAD, RCA 30%. No intervention was performed.        - Medical management of NSTEMI, without good option of intervention. She is now chest pain free.     - Repeat echocardiogram    - c/w DAPT    - c/w statin    - c/w metoprolol, norvasc, clonidine    - add ACEi for decreased LV function, if no renal contraindication    - pt appears euvolemic. Continue with HD per renal    - will need to see a downward troponin trend, prior to d/c home 68yo  female with Hepatitis A, HTN, HLD, MI, CAD s/p 1V CABG 01/2019 and CANDE x 2 at [facility], moderate MR, PAD, depression/anxiety, DM2 A1C 9.0, ESRD on HD for past 1.5 years via AV fistula, former smoker who presented to [facility] 12/12/19 with complaints of chest tightness/throat tightness and SOB at rest that lasted 4 hours prior to seeking medical attention.   Cardiac biomarkers positive 12.8 -->15.6.  Pt started on Heparin ACS.  CT chest 12/12/19 pulmonary edema and small bilateral  pleural effusions.      TTE 5/1/18 EF 30% inferior, posterior and lateral walls are severely hypokinetic to akinetic, basal inferior wall thinned and aneurysmal, apex is hypokinetic, as is the distal carrie septum.      Cardiac cath showed 100% LAD with patent LIMA to LAD, RCA 30%. No intervention was performed.        - Medical management of NSTEMI, without good option of intervention. She is now chest pain free.     - Repeat echocardiogram    - c/w DAPT    - c/w statin    - c/w metoprolol, norvasc, clonidine    - add ACEi for decreased LV function, if no renal contraindication    - pt appears euvolemic. Continue with HD per renal    - will need to see a downward troponin trend, prior to d/c home    Pt signed out AMA

## 2019-12-16 NOTE — PROGRESS NOTE ADULT - PROBLEM SELECTOR PROBLEM 1
CAD (coronary artery disease)
CAD (coronary artery disease)
Dialysis patient
Type 2 diabetes mellitus with hyperglycemia, with long-term current use of insulin

## 2019-12-16 NOTE — CHART NOTE - NSCHARTNOTEFT_GEN_A_CORE
Was called by RN due to patient wanting to sign out AMA. Asked patient why she wanted to leave, reports she does not want to sit in the hospital any longer.     Patient is AAO x 4. I explained at length to the patient the risks of signing out AMA including but not limited to harm, injury or death. I offered to answer any questions and fully answered any such questions. We believe that the patient fully understands what has been explained and answered. I informed Attending physcian, Dr. Reeder of this. Patient signed form to sign out AMA and accepts responsibility for any and all results of this decision.    April Mo  Spectra-link [id number]

## 2019-12-16 NOTE — PROGRESS NOTE ADULT - ASSESSMENT
68yo F w/h/o uncontrolled T2DM with complications and comorbidities. Here from OSH with CP/SOB/lightheadedness/dizziness/near syncope. Found to have pulmonary edema, small bilateral  pleural effusions and NSTEMI> medically treated. Tolerating POs with glycemic control variable between 100s to 200s depending on PO intake. No hypoglycemia. Pt eager to go home since she states feeling much better> free of SOB/CP. Noted pt signing AMA this pm.  Spoke to pt this am about home regimen of Lantus bid plus Fiast insulin per correction scale. Reviewed with pt BG levels while in hospital and explained to pt the need to start a standing dose of premeal insulin to improve glycemic control. A1C is 9.1% but need to improved to at least 8% after recent cardiac event. Pt verbalized understanding and agreed with basal/bolus insulin plan.  Met with patient and reviewed the following:    -A1c LEVEL: Present and goal  -Blood glucose goals: 100s to 150s as out pt  -Glucose monitoring frequency: ac and hs  -Hypoglycemia prevention,detection and treatment  -Healthy eating and portion control  -Insulin(s) action, time of administration and side effects. Lantus and Fiast  -Importance of follow up care. Follows with Dr Huggins endocrinologist  Pt able to verbalize understanding and give teach back about insulin plan of care  Spent 20 minutes providing face to face education.

## 2019-12-16 NOTE — PROGRESS NOTE ADULT - PROBLEM SELECTOR PLAN 1
cont aspirin  cards f/u
cont aspirin  cards f/u - elevated troponin - chest pain free - monitor closely
-Test BG ac and hs  -C/w Lantus 10 q am and 15 qhs  -Increase Humalog to 8 units ac meals since pt needs this total dose ac meals while in hospital (Whenever pt receives 6 units only  <BG goes >200s)  -Upon discharge continue above insulin regimen but give Fiast insulin ac meals instead of Humalog. Explained to pt effect of Fiast versus Humalog.  -Pt to f/u with pvt endo Dr Huggins.  -Plan discussed with pt/team.  Contact info: [phone] (24/7). pager [phone]  -
Excess fluids and waste products will be removed from your blood; your electrolytes will be balanced; your blood pressure will be controlled.

## 2019-12-16 NOTE — PROGRESS NOTE ADULT - ASSESSMENT
70yo  female with Hepatitis A, HTN, HLD, MI, CAD s/p 1V CABG 01/2019 and CANDE x 2 at [facility], moderate MR, PAD, depression/anxiety, DM2 A1C 9.0, ESRD on HD for past 1.5 years via AV fistula, former smoker who presented to [facility] 12/12/19 with complaints of chest tightness/throat tightness and SOB at rest that lasted 4 hours prior to seeking medical attention.   Cardiac biomarkers positive 12.8 -->15.6.  Pt started on Heparin ACS.  CT chest 12/12/19 pulmonary edema and small bilateral  pleural effusions.    TTE 5/1/18 EF 30% inferior, posterior and lateral walls are severely hypokinetic to akinetic, basal inferior wall thinned and aneurysmal, apex is hypokinetic, as is the distal carrie septum.    Cardiac cath showed 100% LAD with patent LIMA to LAD, RCA 30%. No intervention was performed.    - Medical management of NSTEMI, without good option of intervention. She is now chest pain free.   - Repeat echocardiogram this morning.  - c/w DAPT  - c/w statin  - c/w metoprolol, norvasc, clonidine  - add ACEi for decreased LV function, if no renal contraindication  - pt appears euvolemic. Continue with HD per renal  - will need to see a downward troponin trend, prior to d/c home  - Will follow along with you

## 2019-12-16 NOTE — PROGRESS NOTE ADULT - PROBLEM SELECTOR PROBLEM 2
Type 2 diabetes mellitus with hyperglycemia, with long-term current use of insulin
Type 2 diabetes mellitus with hyperglycemia, with long-term current use of insulin
CAD (coronary artery disease)

## 2019-12-16 NOTE — PROGRESS NOTE ADULT - SUBJECTIVE AND OBJECTIVE BOX
DIABETES FOLLOW UP NOTE: Saw pt earlier today  INTERVAL HX: 68yo F w/h/o uncontrolled T2DM c/b ESRD>HD/retinopathy> laser>neuropathy. Also h/o Hepatitis A, HTN, HLD, MI, CAD s/p CANDE x 2 at [facility], moderate MR, PAD, depression/anxiety, Presented to OSH with CP/SOB/lightheadedness/dizziness/near syncope at last three episodes of dialysis. Found to have pulmonary edema, small bilateral  pleural effusions and NSTEMI> medically treated. Reports tolerating POs with glycemic control variable between 100s to 200s depending on PO intake. No hypoglycemia. Pt eager to go home since she states feeling much better> free of SOB/CP. Noted pt signing AMA this pm.      Review of Systems:  General: As above  Cardiovascular: No chest pain, palpitations  Respiratory: No SOB, no cough  GI: No nausea, vomiting, abdominal pain  Endocrine: no polyuria, polydipsia or S&Sx of hypoglycemia    Allergies    latex (Unknown)  No Known Drug Allergies    Intolerances      MEDICATIONS:  atorvastatin 40 milliGRAM(s) Oral at bedtime  insulin glargine Injectable (LANTUS) 10 Unit(s) SubCutaneous every morning  insulin glargine Injectable (LANTUS) 15 Unit(s) SubCutaneous at bedtime  insulin lispro (HumaLOG) corrective regimen sliding scale   SubCutaneous three times a day before meals  insulin lispro (HumaLOG) corrective regimen sliding scale   SubCutaneous at bedtime  insulin lispro Injectable (HumaLOG) 6 Unit(s) SubCutaneous three times a day before meals    PHYSICAL EXAM:  VITALS: T(C): 36.8 (12-16-19 @ 12:31)  T(F): 98.2 (12-16-19 @ 12:31), Max: 98.6 (12-16-19 @ 04:07)  HR: 85 (12-16-19 @ 12:31) (74 - 86)  BP: 124/69 (12-16-19 @ 12:31) (107/55 - 133/75)  RR:  (18 - 18)  SpO2:  (94% - 100%)  Wt(kg): --  GENERAL: Female sitting in chair in NAD  Abdomen: Soft, nontender, non distended, central adiposity  Extremities: Warm, no edema in all 4 exts  NEURO: A&O X3    LABS:  POCT Blood Glucose.: 146 mg/dL (12-16-19 @ 16:39)  POCT Blood Glucose.: 221 mg/dL (12-16-19 @ 12:00)  POCT Blood Glucose.: 150 mg/dL (12-16-19 @ 08:08)  POCT Blood Glucose.: 215 mg/dL (12-15-19 @ 21:35)  POCT Blood Glucose.: 115 mg/dL (12-15-19 @ 16:33)  POCT Blood Glucose.: 269 mg/dL (12-15-19 @ 11:39)  POCT Blood Glucose.: 171 mg/dL (12-15-19 @ 07:52)  POCT Blood Glucose.: 184 mg/dL (12-15-19 @ 00:39)  POCT Blood Glucose.: 159 mg/dL (12-14-19 @ 21:32)  POCT Blood Glucose.: 232 mg/dL (12-14-19 @ 16:36)  POCT Blood Glucose.: 122 mg/dL (12-14-19 @ 13:13)  POCT Blood Glucose.: 246 mg/dL (12-14-19 @ 09:44)  POCT Blood Glucose.: 252 mg/dL (12-14-19 @ 08:18)  POCT Blood Glucose.: 285 mg/dL (12-13-19 @ 21:32)  POCT Blood Glucose.: 298 mg/dL (12-13-19 @ 19:40)  POCT Blood Glucose.: 318 mg/dL (12-13-19 @ 17:59)                            8.6    11.94 )-----------( 237      ( 16 Dec 2019 08:22 )             26.5       12-16    137  |  93<L>  |  59<H>  ----------------------------<  163<H>  4.7   |  27  |  7.47<H>    EGFR if : 6<L>  EGFR if non : 5<L>    Ca    9.5      12-16  Mg     2.2     12-14  Phos  5.1     12-14      Hemoglobin A1C, Whole Blood: 9.1 % <H> [4.0 - 5.6] (12-13-19 @ 08:49)

## 2019-12-16 NOTE — PROGRESS NOTE ADULT - ASSESSMENT
70yo  female with PMH Hepatitis A, HTN, HLD, MI, CAD s/p CANDE x 2 at [facility], moderate MR, PAD, depression/anxiety, DM2 A1C 9.0, ESRD on HD for past 1.5 years via AV fistula last treatment 12/12/19 managed by Dr. Perez at [facility], former smoker 100 pack years who presented to [facility] 12/12/19 with complaints of chest tightness/throat tightness and SOB at rest that lasted 4 hours prior to seeking medical attention.  Denied dizziness, diaphoresis, palpitations, nausea, vomiting, peripheral edema, recent weight gain, or syncope. No implanted monitoring deices. Pt. states she has had lightheadedness/dizziness/near syncope at last three episodes of dialysis. Cardiac biomarkers positive 12.8>15.6.  Pt. started on Heparin ACS.  CT chest 12/12/19 pulmonary edema and small bilateral  pleural effusions.  CT head 12/12/19 Moderate cerebral parenchymal volume loss and chronic ischemic white matter changes. No intra-axial or extra-axial hemorrhage edema, territorial infarct or mass effect. Clear sinuses. Cranium intact. TTE 5/1/18 EF 30% inferior, posterior and lateral walls are severely hypokinetic to akinetic, basal inferior wall thinned and aneurysmal, apex is hypokinetic, as is the distal anteroseptum.  D dimer 12/12/19 274.  WBC 16.8---15.6.  Pt. transferred asymptomatic for further evaluation and cardiac cath. Pt. with current [facility] documentation 12/12/19 DNR.

## 2019-12-16 NOTE — DISCHARGE NOTE PROVIDER - NSDCCPCAREPLAN_GEN_ALL_CORE_FT
PRINCIPAL DISCHARGE DIAGNOSIS  Diagnosis: Pain, chest wall  Assessment and Plan of Treatment: s/p cardiac catherization: medical management  Take medication as directed  Follow-up with cardiology      SECONDARY DISCHARGE DIAGNOSES  Diagnosis: ESRD (end stage renal disease)  Assessment and Plan of Treatment: Continue with dialysis   Follow-up with your Kidney doctor

## 2019-12-16 NOTE — PROGRESS NOTE ADULT - PROBLEM SELECTOR PLAN 3
cont current htn med regimen
cont current htn med regimen
BP monitoring,continue current antihypertensive meds, low salt diet,followup with PMD in 1-2 weeks

## 2019-12-17 LAB
CULTURE RESULTS: SIGNIFICANT CHANGE UP
CULTURE RESULTS: SIGNIFICANT CHANGE UP
SPECIMEN SOURCE: SIGNIFICANT CHANGE UP
SPECIMEN SOURCE: SIGNIFICANT CHANGE UP

## 2020-01-30 NOTE — PATIENT PROFILE ADULT. - NS PRO AD NO ADVANCE DIRECTIVE
Detail Level: Detailed Expiration Date (Month Year): 6/2022 Anterior Platysmal Bands Units: 0 Dilution (U/0.1 Cc): 5 Post-Care Instructions: Patient instructed to not lie down for 30 minutes.  Dynamic movement of treated areas may enhance the effect of the neuromodulator and is recommend every 5 minutes for a 30 min interval. Forehead Units: 10 Lot #: [id number] Price (Use Numbers Only, No Special Characters Or $): 134 Additional Area 1 Location: perioral Consent: Written consent obtained. Risks include but not limited to lid/brow ptosis, bruising, swelling, diplopia, temporary effect, incomplete chemical denervation. No Glabellar Complex Units: 15

## 2020-03-12 NOTE — DISCHARGE NOTE ADULT - NS MD DC FALL RISK RISK
Lost IV access, the patient ripped it out. Writer and other RNs attempt to gain IV access, was unsuccessful. The patient needs Ultrasound IV access and is awaiting for that to be done. Will follow up and continue to monitor.     For information on Fall & Injury Prevention, visit [email or web address]

## 2020-05-23 NOTE — ED ADULT TRIAGE NOTE - NS ED NURSE DIRECT TO ROOM YN
No Patient brought in by brother from home as reported was Patient brought in by brother from home as reported was coughing and had shortness of breath was seen and evaluated by MD with orders made and carried out with brother at the bedside as advised was covid positive no labored breathing noted and was following instruction.

## 2021-07-14 NOTE — ED PROVIDER NOTE - CPE EDP EYES NORM
"[facility] Sim[facility].    Discharge instructions, which include signs and symptoms to monitor patient for, as well as detailed information regarding eye injury provided.  All questions and concerns addressed at this time.    This RN also encouraged a follow- up appointment to be made with Optho's office contact information with phone number and address provided.     Prescription for prednisolone/atropine drops provided to patient.     Children's Tylenol (160mg/5mL) / Children's Motrin (100mg/5mL) dosing sheet with the appropriate dose per the patient's current weight was highlighted and provided with discharge instructions.  Time when patient's next safe, weight-based dose can be administered highlighted.    Patient leaves ER in no apparent distress. This RN provided education regarding returning to the ER for any new concerns or changes in patient's condition.      /73   Pulse 77   Temp 36.3 °C (97.3 °F) (Temporal)   Resp 18   Ht 1.727 m (5' 8\")   Wt 57.4 kg (126 lb 8.7 oz)   LMP 06/22/2021   SpO2 99%   BMI 19.24 kg/m²   "
MD at bedside.   
Med Rec completed: per pt at bedside with aunt present.     No ORAL antibiotics in last 30 days    Preferred Pharmacy: Unknown - To verify with pt's father upon arrival.    Pt confirmed following allergies:  Allergies   Allergen Reactions   • Augmentin Shortness of Breath and Rash     hives      Pt's home medications:   No current facility-administered medications on file prior to encounter.     No current outpatient medications on file prior to encounter.               
opthalmology at bedside.   
normal...

## 2022-03-13 NOTE — H&P ADULT - PROBLEM SELECTOR PROBLEM 2
Chart reviewed, including chest x-ray and labs. Patient with 130ml out from chest tube in the past 24h s/p intrapleural TPA yesterday. Hemoglobin stable at 9.5. Continue chest tube to suction for now and re-check a chest x-ray in the morning.        Acute renal failure CHF with unknown LVEF

## 2022-03-30 ENCOUNTER — APPOINTMENT (OUTPATIENT)
Dept: WOUND CARE | Facility: HOSPITAL | Age: 72
End: 2022-03-30
Payer: MEDICARE

## 2022-03-30 ENCOUNTER — RESULT REVIEW (OUTPATIENT)
Age: 72
End: 2022-03-30

## 2022-03-30 ENCOUNTER — OUTPATIENT (OUTPATIENT)
Dept: OUTPATIENT SERVICES | Facility: HOSPITAL | Age: 72
LOS: 1 days | Discharge: HOME | End: 2022-03-30
Payer: MEDICARE

## 2022-03-30 VITALS
SYSTOLIC BLOOD PRESSURE: 182 MMHG | DIASTOLIC BLOOD PRESSURE: 71 MMHG | OXYGEN SATURATION: 97 % | WEIGHT: 140 LBS | TEMPERATURE: 98.7 F | RESPIRATION RATE: 20 BRPM | HEIGHT: 69 IN | HEART RATE: 86 BPM | BODY MASS INDEX: 20.73 KG/M2

## 2022-03-30 VITALS — SYSTOLIC BLOOD PRESSURE: 156 MMHG | DIASTOLIC BLOOD PRESSURE: 69 MMHG

## 2022-03-30 DIAGNOSIS — Z82.0 FAMILY HISTORY OF EPILEPSY AND OTHER DISEASES OF THE NERVOUS SYSTEM: ICD-10-CM

## 2022-03-30 DIAGNOSIS — S91.309A UNSPECIFIED OPEN WOUND, UNSPECIFIED FOOT, INITIAL ENCOUNTER: ICD-10-CM

## 2022-03-30 DIAGNOSIS — N19 UNSPECIFIED KIDNEY FAILURE: ICD-10-CM

## 2022-03-30 DIAGNOSIS — Z78.9 OTHER SPECIFIED HEALTH STATUS: ICD-10-CM

## 2022-03-30 DIAGNOSIS — K08.89 OTHER SPECIFIED DISORDERS OF TEETH AND SUPPORTING STRUCTURES: ICD-10-CM

## 2022-03-30 DIAGNOSIS — Z97.2 OTHER SPECIFIED DISORDERS OF TEETH AND SUPPORTING STRUCTURES: ICD-10-CM

## 2022-03-30 DIAGNOSIS — Z86.79 PERSONAL HISTORY OF OTHER DISEASES OF THE CIRCULATORY SYSTEM: ICD-10-CM

## 2022-03-30 DIAGNOSIS — Z99.2 DEPENDENCE ON RENAL DIALYSIS: ICD-10-CM

## 2022-03-30 LAB
GRAM STN FLD: SIGNIFICANT CHANGE UP
SPECIMEN SOURCE: SIGNIFICANT CHANGE UP

## 2022-03-30 PROCEDURE — 71046 X-RAY EXAM CHEST 2 VIEWS: CPT | Mod: 26

## 2022-03-30 PROCEDURE — 87186 SC STD MICRODIL/AGAR DIL: CPT

## 2022-03-30 PROCEDURE — 87070 CULTURE OTHR SPECIMN AEROBIC: CPT

## 2022-03-30 PROCEDURE — 99203 OFFICE O/P NEW LOW 30 MIN: CPT

## 2022-03-30 PROCEDURE — 87077 CULTURE AEROBIC IDENTIFY: CPT

## 2022-03-30 PROCEDURE — 87075 CULTR BACTERIA EXCEPT BLOOD: CPT

## 2022-03-30 PROCEDURE — 73630 X-RAY EXAM OF FOOT: CPT

## 2022-03-30 PROCEDURE — G0463: CPT

## 2022-03-30 PROCEDURE — 73630 X-RAY EXAM OF FOOT: CPT | Mod: 26,50

## 2022-03-30 PROCEDURE — 71046 X-RAY EXAM CHEST 2 VIEWS: CPT

## 2022-03-30 RX ORDER — IMIPRAMINE HYDROCHLORIDE 50 MG/1
50 TABLET ORAL DAILY
Refills: 0 | Status: ACTIVE | COMMUNITY

## 2022-03-30 RX ORDER — ATORVASTATIN CALCIUM 40 MG/1
40 TABLET, FILM COATED ORAL DAILY
Refills: 0 | Status: ACTIVE | COMMUNITY

## 2022-04-01 DIAGNOSIS — E11.40 TYPE 2 DIABETES MELLITUS WITH DIABETIC NEUROPATHY, UNSPECIFIED: ICD-10-CM

## 2022-04-01 DIAGNOSIS — Z79.899 OTHER LONG TERM (CURRENT) DRUG THERAPY: ICD-10-CM

## 2022-04-01 DIAGNOSIS — Z82.49 FAMILY HISTORY OF ISCHEMIC HEART DISEASE AND OTHER DISEASES OF THE CIRCULATORY SYSTEM: ICD-10-CM

## 2022-04-01 DIAGNOSIS — L97.512 NON-PRESSURE CHRONIC ULCER OF OTHER PART OF RIGHT FOOT WITH FAT LAYER EXPOSED: ICD-10-CM

## 2022-04-01 DIAGNOSIS — Z91.040 LATEX ALLERGY STATUS: ICD-10-CM

## 2022-04-01 DIAGNOSIS — L97.324 NON-PRESSURE CHRONIC ULCER OF LEFT ANKLE WITH NECROSIS OF BONE: ICD-10-CM

## 2022-04-01 DIAGNOSIS — E11.622 TYPE 2 DIABETES MELLITUS WITH OTHER SKIN ULCER: ICD-10-CM

## 2022-04-01 DIAGNOSIS — Z95.828 PRESENCE OF OTHER VASCULAR IMPLANTS AND GRAFTS: ICD-10-CM

## 2022-04-01 DIAGNOSIS — E11.51 TYPE 2 DIABETES MELLITUS WITH DIABETIC PERIPHERAL ANGIOPATHY WITHOUT GANGRENE: ICD-10-CM

## 2022-04-01 DIAGNOSIS — I10 ESSENTIAL (PRIMARY) HYPERTENSION: ICD-10-CM

## 2022-04-01 DIAGNOSIS — Z95.5 PRESENCE OF CORONARY ANGIOPLASTY IMPLANT AND GRAFT: ICD-10-CM

## 2022-04-01 DIAGNOSIS — Z87.891 PERSONAL HISTORY OF NICOTINE DEPENDENCE: ICD-10-CM

## 2022-04-01 DIAGNOSIS — E11.621 TYPE 2 DIABETES MELLITUS WITH FOOT ULCER: ICD-10-CM

## 2022-04-01 DIAGNOSIS — Z82.0 FAMILY HISTORY OF EPILEPSY AND OTHER DISEASES OF THE NERVOUS SYSTEM: ICD-10-CM

## 2022-04-01 DIAGNOSIS — N19 UNSPECIFIED KIDNEY FAILURE: ICD-10-CM

## 2022-04-01 LAB
-  AMIKACIN: SIGNIFICANT CHANGE UP
-  AMIKACIN: SIGNIFICANT CHANGE UP
-  AMOXICILLIN/CLAVULANIC ACID: SIGNIFICANT CHANGE UP
-  AMOXICILLIN/CLAVULANIC ACID: SIGNIFICANT CHANGE UP
-  AMPICILLIN/SULBACTAM: SIGNIFICANT CHANGE UP
-  AMPICILLIN: SIGNIFICANT CHANGE UP
-  AMPICILLIN: SIGNIFICANT CHANGE UP
-  AZTREONAM: SIGNIFICANT CHANGE UP
-  AZTREONAM: SIGNIFICANT CHANGE UP
-  CEFAZOLIN: SIGNIFICANT CHANGE UP
-  CEFEPIME: SIGNIFICANT CHANGE UP
-  CEFEPIME: SIGNIFICANT CHANGE UP
-  CEFOXITIN: SIGNIFICANT CHANGE UP
-  CEFOXITIN: SIGNIFICANT CHANGE UP
-  CEFTRIAXONE: SIGNIFICANT CHANGE UP
-  CEFTRIAXONE: SIGNIFICANT CHANGE UP
-  CIPROFLOXACIN: SIGNIFICANT CHANGE UP
-  CIPROFLOXACIN: SIGNIFICANT CHANGE UP
-  CLINDAMYCIN: SIGNIFICANT CHANGE UP
-  ERTAPENEM: SIGNIFICANT CHANGE UP
-  ERTAPENEM: SIGNIFICANT CHANGE UP
-  ERYTHROMYCIN: SIGNIFICANT CHANGE UP
-  GENTAMICIN: SIGNIFICANT CHANGE UP
-  IMIPENEM: SIGNIFICANT CHANGE UP
-  IMIPENEM: SIGNIFICANT CHANGE UP
-  LEVOFLOXACIN: SIGNIFICANT CHANGE UP
-  LEVOFLOXACIN: SIGNIFICANT CHANGE UP
-  MEROPENEM: SIGNIFICANT CHANGE UP
-  MEROPENEM: SIGNIFICANT CHANGE UP
-  OXACILLIN: SIGNIFICANT CHANGE UP
-  PENICILLIN: SIGNIFICANT CHANGE UP
-  PIPERACILLIN/TAZOBACTAM: SIGNIFICANT CHANGE UP
-  PIPERACILLIN/TAZOBACTAM: SIGNIFICANT CHANGE UP
-  RIFAMPIN: SIGNIFICANT CHANGE UP
-  TETRACYCLINE: SIGNIFICANT CHANGE UP
-  TOBRAMYCIN: SIGNIFICANT CHANGE UP
-  TOBRAMYCIN: SIGNIFICANT CHANGE UP
-  TRIMETHOPRIM/SULFAMETHOXAZOLE: SIGNIFICANT CHANGE UP
-  VANCOMYCIN: SIGNIFICANT CHANGE UP
METHOD TYPE: SIGNIFICANT CHANGE UP

## 2022-04-01 NOTE — PHYSICAL EXAM
[4 x 4] : 4 x 4  [0] : left 0 [Skin Ulcer] : ulcer [Alert] : alert [Oriented to Person] : oriented to person [Oriented to Place] : oriented to place [Calm] : calm [Varicose Veins Of Lower Extremities] : not present [Purpura] : no purpura  [Petechiae] : no petechiae [de-identified] : calm [de-identified] : hammer toes  [de-identified] : DFU 3 medial left ankle and DFU 2 , hallux and 2nd toe  [FreeTextEntry1] : Right Distal Hallux [FreeTextEntry2] : 0.5 [FreeTextEntry3] : 1.5 [FreeTextEntry4] : 0.1 [de-identified] : serosanguineous [de-identified] : callused - DPM shaved callus [de-identified] : 100% [de-identified] : none [de-identified] : none [de-identified] : Alginate Ag [de-identified] : Mechanically cleansed with sterile gauze and normal saline 0.9%\par Dry Dressing\par  [FreeTextEntry7] : Right Foot 2nd Digit [FreeTextEntry8] : 0.5 [FreeTextEntry9] : 0.5 [de-identified] : 0.2 [de-identified] : callused - DPM shaved callus [de-identified] : none [de-identified] : 100% [de-identified] : none [de-identified] : none [de-identified] : Alginate Ag [de-identified] : Mechanically cleansed with sterile gauze and normal saline 0.9%\par Dry Dressing\par  [de-identified] : Tissue culture obtained [de-identified] : Left Medial Ankle [de-identified] : 0.6 [de-identified] : 1.1 [de-identified] : 0.6 to bone [de-identified] : serous [de-identified] : callused - DPM shaved callus [de-identified] : none [de-identified] : 100% [de-identified] : none [de-identified] : none [de-identified] : Alginate Ag [de-identified] : Mechanically cleansed with sterile gauze and normal saline 0.9%\par Dry Dressing\par  [de-identified] : CIRCULATION\par \par Dorsalis Pedis: R/L Un-Palpable -------- Audible via Doppler\par Posterior Tibialis: R/L Un-Palpable -------- Audible via Doppler\par Extremity Color: Pigmented\par Extremity Temperature: Cool\par Capillary Refill: < 3 seconds bilaterally\par \par Vascular studies ordered by Dr YESIKA perla sheet submitted\par  [TWNoteComboBox4] : Small [TWNoteComboBox5] : No [TWNoteComboBox6] : Diabetic [de-identified] : No [de-identified] : other [de-identified] : None [de-identified] : 3x Weekly [de-identified] : Primary Dressing [de-identified] : None [de-identified] : Diabetic [de-identified] : No [de-identified] : 3x Weekly [de-identified] : Primary Dressing [de-identified] : Small [de-identified] : No [de-identified] : Diabetic [de-identified] : No [de-identified] : None [de-identified] : Cultures obtained [de-identified] : 3x Weekly [de-identified] : Primary Dressing

## 2022-04-01 NOTE — ASSESSMENT
[] : Yes [Verbal] : Verbal [Written] : Written [Demo] : Demo [Patient] : Patient [Spouse] : Spouse [Good - alert, interested, motivated] : Good - alert, interested, motivated [Demonstrates independently] : demonstrates independently [Dressing changes] : dressing changes [Foot Care] : foot care [Skin Care] : skin care [Signs and symptoms of infection] : sign and symptoms of infection [Venous Disease] : venous disease [Nutrition] : nutrition [How and When to Call] : how and when to call [Labs and Tests] : labs and tests [Hyperbaric Therapy] : hyperbaric therapy [Off-loading] : off-loading [Patient responsibility to plan of care] : patient responsibility to plan of care [Glycemic Control] : glycemic control [Stable] : stable [Other: ____] : [unfilled] [Wheelchair] : Wheelchair [Not Applicable - Long Term Care/Home Health Agency] : Long Term Care/Home Health Agency: Not Applicable [FreeTextEntry2] : Infection prevention \par Wound care (dressing changes)\par Maintain optimal skin integrity to high pressure areas\par Nutrition and wound healing\par Offloading the stress on skin structures and decreasing potential pathologic biomechanical influences.\par HBOT\par Vascular Consult / Vascular studies\par Culture \par Glycemic control\par MRI [FreeTextEntry3] : Initial visit [FreeTextEntry4] : New Eval protocol; Dr. Smalls next visit. \par Vascular studies ordered. Authorization submitted\par Xray/B/W ordered. Pt to complete after today's visit. Pt provided with prescription for completion of same.\par MRI ordered. Authorization submitted\par Tissue culture obtained and sent to lab\par \par Pt is an HBOT candidate ([id number]). Authorization submitted. \par HBOT orientation video completed\par B/W / 2-View Xray to be completed after today's visit \par Pre-procedural checklist signed\par HBOT consent signed\par HBOT order (2.4 ROSHNI) signed by DPM\par ** TM to be completed once HBOT is approved, COVID PCR to be completed ** \par

## 2022-04-01 NOTE — PLAN
[FreeTextEntry1] : Blood work ,x rays , MRI and vascular studies , consult with Dr Corley ,Patient high risk for limb loss and life threatening sepsis . Patient would benefit from HBOT for DFU 3 and non healing ulcers s/p revascularization  Spent 34 minutes for patient care and medical decision making.Continue wound care \par

## 2022-04-01 NOTE — HISTORY OF PRESENT ILLNESS
[FreeTextEntry1] : SHILO KOHLER is being seen for a initial nursing assessment visit. Pt reports to the [facility] with wounds on her right great toe, right foot 2nd digit, and a left medial ankle wound that Pt had since, "November 2021". Pt states she is a diabetic and gets "sores" on her feet. Pt's diabetes is monitored by Dr. Pauly Kirk and does not follow up with Endocrinologist "often". Patient accompanied by spouse

## 2022-04-01 NOTE — REVIEW OF SYSTEMS
[Joint Stiffness] : joint stiffness [Skin Wound] : skin wound [Fever] : no fever [Chills] : no chills [Eye Pain] : no eye pain [Loss Of Hearing] : no hearing loss [Shortness Of Breath] : no shortness of breath [Abdominal Pain] : no abdominal pain [Anxiety] : no anxiety [Easy Bleeding] : no tendency for easy bleeding [FreeTextEntry5] : HTN, HLD , PVD ( patient has had previous vascular interventions ) severe diabetic small vessel disease  [FreeTextEntry6] : Hx of long term smoking  [de-identified] : DFU 3 medial left ankle , probes to bone , DFU 2 distal hallux and 2nd toe skin, subcu , fat  [de-identified] : IDDM with neuropathy  [de-identified] : PJ

## 2022-04-05 ENCOUNTER — INPATIENT (INPATIENT)
Facility: HOSPITAL | Age: 72
LOS: 7 days | Discharge: HOME | DRG: 640 | End: 2022-04-13
Attending: INTERNAL MEDICINE | Admitting: INTERNAL MEDICINE
Payer: MEDICARE

## 2022-04-05 VITALS
DIASTOLIC BLOOD PRESSURE: 68 MMHG | TEMPERATURE: 99 F | HEIGHT: 69 IN | RESPIRATION RATE: 16 BRPM | OXYGEN SATURATION: 97 % | SYSTOLIC BLOOD PRESSURE: 132 MMHG | HEART RATE: 97 BPM | WEIGHT: 139.99 LBS

## 2022-04-05 DIAGNOSIS — I10 ESSENTIAL (PRIMARY) HYPERTENSION: ICD-10-CM

## 2022-04-05 DIAGNOSIS — I25.10 ATHEROSCLEROTIC HEART DISEASE OF NATIVE CORONARY ARTERY WITHOUT ANGINA PECTORIS: ICD-10-CM

## 2022-04-05 DIAGNOSIS — N18.6 END STAGE RENAL DISEASE: ICD-10-CM

## 2022-04-05 DIAGNOSIS — I48.91 UNSPECIFIED ATRIAL FIBRILLATION: ICD-10-CM

## 2022-04-05 DIAGNOSIS — Z91.19 PATIENT'S NONCOMPLIANCE WITH OTHER MEDICAL TREATMENT AND REGIMEN: ICD-10-CM

## 2022-04-05 DIAGNOSIS — Z29.9 ENCOUNTER FOR PROPHYLACTIC MEASURES, UNSPECIFIED: ICD-10-CM

## 2022-04-05 DIAGNOSIS — E87.70 FLUID OVERLOAD, UNSPECIFIED: ICD-10-CM

## 2022-04-05 DIAGNOSIS — E87.5 HYPERKALEMIA: ICD-10-CM

## 2022-04-05 DIAGNOSIS — L08.9 LOCAL INFECTION OF THE SKIN AND SUBCUTANEOUS TISSUE, UNSPECIFIED: ICD-10-CM

## 2022-04-05 DIAGNOSIS — R19.7 DIARRHEA, UNSPECIFIED: ICD-10-CM

## 2022-04-05 DIAGNOSIS — E11.9 TYPE 2 DIABETES MELLITUS WITHOUT COMPLICATIONS: ICD-10-CM

## 2022-04-05 LAB
ALBUMIN SERPL ELPH-MCNC: 3 G/DL — LOW (ref 3.3–5)
ALBUMIN SERPL ELPH-MCNC: 3.3 G/DL — SIGNIFICANT CHANGE UP (ref 3.3–5)
ALP SERPL-CCNC: 90 U/L — SIGNIFICANT CHANGE UP (ref 40–120)
ALP SERPL-CCNC: 96 U/L — SIGNIFICANT CHANGE UP (ref 40–120)
ALT FLD-CCNC: 17 U/L — SIGNIFICANT CHANGE UP (ref 12–78)
ALT FLD-CCNC: 18 U/L — SIGNIFICANT CHANGE UP (ref 12–78)
ANION GAP SERPL CALC-SCNC: 13 MMOL/L — SIGNIFICANT CHANGE UP (ref 5–17)
ANION GAP SERPL CALC-SCNC: 15 MMOL/L — SIGNIFICANT CHANGE UP (ref 5–17)
ANISOCYTOSIS BLD QL: SLIGHT — SIGNIFICANT CHANGE UP
APTT BLD: 28.6 SEC — SIGNIFICANT CHANGE UP (ref 27.5–35.5)
AST SERPL-CCNC: 14 U/L — LOW (ref 15–37)
AST SERPL-CCNC: 19 U/L — SIGNIFICANT CHANGE UP (ref 15–37)
BASOPHILS # BLD AUTO: 0.09 K/UL — SIGNIFICANT CHANGE UP (ref 0–0.2)
BASOPHILS NFR BLD AUTO: 0.4 % — SIGNIFICANT CHANGE UP (ref 0–2)
BILIRUB SERPL-MCNC: 0.6 MG/DL — SIGNIFICANT CHANGE UP (ref 0.2–1.2)
BILIRUB SERPL-MCNC: 0.7 MG/DL — SIGNIFICANT CHANGE UP (ref 0.2–1.2)
BUN SERPL-MCNC: 101 MG/DL — HIGH (ref 7–23)
BUN SERPL-MCNC: 35 MG/DL — HIGH (ref 7–23)
CALCIUM SERPL-MCNC: 9 MG/DL — SIGNIFICANT CHANGE UP (ref 8.5–10.1)
CALCIUM SERPL-MCNC: 9.3 MG/DL — SIGNIFICANT CHANGE UP (ref 8.5–10.1)
CHLORIDE SERPL-SCNC: 96 MMOL/L — SIGNIFICANT CHANGE UP (ref 96–108)
CHLORIDE SERPL-SCNC: 96 MMOL/L — SIGNIFICANT CHANGE UP (ref 96–108)
CO2 SERPL-SCNC: 20 MMOL/L — LOW (ref 22–31)
CO2 SERPL-SCNC: 28 MMOL/L — SIGNIFICANT CHANGE UP (ref 22–31)
CREAT SERPL-MCNC: 12 MG/DL — HIGH (ref 0.5–1.3)
CREAT SERPL-MCNC: 5.7 MG/DL — HIGH (ref 0.5–1.3)
EGFR: 3 ML/MIN/1.73M2 — LOW
EGFR: 7 ML/MIN/1.73M2 — LOW
EOSINOPHIL # BLD AUTO: 0.01 K/UL — SIGNIFICANT CHANGE UP (ref 0–0.5)
EOSINOPHIL NFR BLD AUTO: 0 % — SIGNIFICANT CHANGE UP (ref 0–6)
GLUCOSE SERPL-MCNC: 152 MG/DL — HIGH (ref 70–99)
GLUCOSE SERPL-MCNC: 279 MG/DL — HIGH (ref 70–99)
HCT VFR BLD CALC: 31.4 % — LOW (ref 34.5–45)
HGB BLD-MCNC: 10.6 G/DL — LOW (ref 11.5–15.5)
IMM GRANULOCYTES NFR BLD AUTO: 0.9 % — SIGNIFICANT CHANGE UP (ref 0–1.5)
INR BLD: 1.09 RATIO — SIGNIFICANT CHANGE UP (ref 0.88–1.16)
LACTATE SERPL-SCNC: 1.3 MMOL/L — SIGNIFICANT CHANGE UP (ref 0.7–2)
LYMPHOCYTES # BLD AUTO: 0.51 K/UL — LOW (ref 1–3.3)
LYMPHOCYTES # BLD AUTO: 2.4 % — LOW (ref 13–44)
MACROCYTES BLD QL: SLIGHT — SIGNIFICANT CHANGE UP
MAGNESIUM SERPL-MCNC: 2.3 MG/DL — SIGNIFICANT CHANGE UP (ref 1.6–2.6)
MANUAL SMEAR VERIFICATION: YES — SIGNIFICANT CHANGE UP
MCHC RBC-ENTMCNC: 31.7 PG — SIGNIFICANT CHANGE UP (ref 27–34)
MCHC RBC-ENTMCNC: 33.8 GM/DL — SIGNIFICANT CHANGE UP (ref 32–36)
MCV RBC AUTO: 94 FL — SIGNIFICANT CHANGE UP (ref 80–100)
MONOCYTES # BLD AUTO: 1.8 K/UL — HIGH (ref 0–0.9)
MONOCYTES NFR BLD AUTO: 8.4 % — SIGNIFICANT CHANGE UP (ref 2–14)
NEUTROPHILS # BLD AUTO: 18.75 K/UL — HIGH (ref 1.8–7.4)
NEUTROPHILS NFR BLD AUTO: 87.9 % — HIGH (ref 43–77)
NRBC # BLD: 0 /100 WBCS — SIGNIFICANT CHANGE UP (ref 0–0)
NT-PROBNP SERPL-SCNC: HIGH PG/ML (ref 0–125)
OVALOCYTES BLD QL SMEAR: SLIGHT — SIGNIFICANT CHANGE UP
PHOSPHATE SERPL-MCNC: 4.9 MG/DL — HIGH (ref 2.5–4.5)
PLAT MORPH BLD: NORMAL — SIGNIFICANT CHANGE UP
PLATELET # BLD AUTO: 377 K/UL — SIGNIFICANT CHANGE UP (ref 150–400)
PLATELET CLUMP BLD QL SMEAR: ABNORMAL
POIKILOCYTOSIS BLD QL AUTO: SLIGHT — SIGNIFICANT CHANGE UP
POTASSIUM SERPL-MCNC: 3.9 MMOL/L — SIGNIFICANT CHANGE UP (ref 3.5–5.3)
POTASSIUM SERPL-MCNC: 7.5 MMOL/L — CRITICAL HIGH (ref 3.5–5.3)
POTASSIUM SERPL-MCNC: 8 MMOL/L — CRITICAL HIGH (ref 3.5–5.3)
POTASSIUM SERPL-SCNC: 3.9 MMOL/L — SIGNIFICANT CHANGE UP (ref 3.5–5.3)
POTASSIUM SERPL-SCNC: 7.5 MMOL/L — CRITICAL HIGH (ref 3.5–5.3)
POTASSIUM SERPL-SCNC: 8 MMOL/L — CRITICAL HIGH (ref 3.5–5.3)
PROT SERPL-MCNC: 7.4 G/DL — SIGNIFICANT CHANGE UP (ref 6–8.3)
PROT SERPL-MCNC: 7.7 G/DL — SIGNIFICANT CHANGE UP (ref 6–8.3)
PROTHROM AB SERPL-ACNC: 12.8 SEC — SIGNIFICANT CHANGE UP (ref 10.5–13.4)
RBC # BLD: 3.34 M/UL — LOW (ref 3.8–5.2)
RBC # FLD: 18.2 % — HIGH (ref 10.3–14.5)
RBC BLD AUTO: SIGNIFICANT CHANGE UP
SARS-COV-2 RNA SPEC QL NAA+PROBE: SIGNIFICANT CHANGE UP
SODIUM SERPL-SCNC: 131 MMOL/L — LOW (ref 135–145)
SODIUM SERPL-SCNC: 137 MMOL/L — SIGNIFICANT CHANGE UP (ref 135–145)
TROPONIN I, HIGH SENSITIVITY RESULT: 99.3 NG/L — HIGH
WBC # BLD: 21.36 K/UL — HIGH (ref 3.8–10.5)
WBC # FLD AUTO: 21.36 K/UL — HIGH (ref 3.8–10.5)

## 2022-04-05 PROCEDURE — 99285 EMERGENCY DEPT VISIT HI MDM: CPT

## 2022-04-05 PROCEDURE — 93010 ELECTROCARDIOGRAM REPORT: CPT

## 2022-04-05 PROCEDURE — 99221 1ST HOSP IP/OBS SF/LOW 40: CPT

## 2022-04-05 PROCEDURE — 93010 ELECTROCARDIOGRAM REPORT: CPT | Mod: 77

## 2022-04-05 PROCEDURE — 71045 X-RAY EXAM CHEST 1 VIEW: CPT | Mod: 26

## 2022-04-05 PROCEDURE — 99053 MED SERV 10PM-8AM 24 HR FAC: CPT

## 2022-04-05 RX ORDER — INSULIN LISPRO 100/ML
VIAL (ML) SUBCUTANEOUS
Refills: 0 | Status: DISCONTINUED | OUTPATIENT
Start: 2022-04-05 | End: 2022-04-13

## 2022-04-05 RX ORDER — AMLODIPINE BESYLATE 2.5 MG/1
7.5 TABLET ORAL EVERY 24 HOURS
Refills: 0 | Status: DISCONTINUED | OUTPATIENT
Start: 2022-04-05 | End: 2022-04-12

## 2022-04-05 RX ORDER — TRAMADOL HYDROCHLORIDE 50 MG/1
50 TABLET ORAL THREE TIMES A DAY
Refills: 0 | Status: DISCONTINUED | OUTPATIENT
Start: 2022-04-05 | End: 2022-04-12

## 2022-04-05 RX ORDER — SODIUM POLYSTYRENE SULFONATE 4.1 MEQ/G
30 POWDER, FOR SUSPENSION ORAL ONCE
Refills: 0 | Status: COMPLETED | OUTPATIENT
Start: 2022-04-05 | End: 2022-04-05

## 2022-04-05 RX ORDER — GLUCAGON INJECTION, SOLUTION 0.5 MG/.1ML
1 INJECTION, SOLUTION SUBCUTANEOUS ONCE
Refills: 0 | Status: DISCONTINUED | OUTPATIENT
Start: 2022-04-05 | End: 2022-04-13

## 2022-04-05 RX ORDER — AMLODIPINE BESYLATE 2.5 MG/1
5 TABLET ORAL DAILY
Refills: 0 | Status: DISCONTINUED | OUTPATIENT
Start: 2022-04-05 | End: 2022-04-05

## 2022-04-05 RX ORDER — DEXTROSE 50 % IN WATER 50 %
25 SYRINGE (ML) INTRAVENOUS ONCE
Refills: 0 | Status: DISCONTINUED | OUTPATIENT
Start: 2022-04-05 | End: 2022-04-13

## 2022-04-05 RX ORDER — PIPERACILLIN AND TAZOBACTAM 4; .5 G/20ML; G/20ML
3.38 INJECTION, POWDER, LYOPHILIZED, FOR SOLUTION INTRAVENOUS ONCE
Refills: 0 | Status: COMPLETED | OUTPATIENT
Start: 2022-04-05 | End: 2022-04-05

## 2022-04-05 RX ORDER — DEXTROSE 50 % IN WATER 50 %
12.5 SYRINGE (ML) INTRAVENOUS ONCE
Refills: 0 | Status: DISCONTINUED | OUTPATIENT
Start: 2022-04-05 | End: 2022-04-13

## 2022-04-05 RX ORDER — ATORVASTATIN CALCIUM 80 MG/1
40 TABLET, FILM COATED ORAL AT BEDTIME
Refills: 0 | Status: DISCONTINUED | OUTPATIENT
Start: 2022-04-05 | End: 2022-04-13

## 2022-04-05 RX ORDER — CALCIUM ACETATE 667 MG
667 TABLET ORAL
Refills: 0 | Status: DISCONTINUED | OUTPATIENT
Start: 2022-04-05 | End: 2022-04-13

## 2022-04-05 RX ORDER — HEPARIN SODIUM 5000 [USP'U]/ML
5000 INJECTION INTRAVENOUS; SUBCUTANEOUS EVERY 12 HOURS
Refills: 0 | Status: DISCONTINUED | OUTPATIENT
Start: 2022-04-05 | End: 2022-04-07

## 2022-04-05 RX ORDER — LACTOBACILLUS ACIDOPHILUS 100MM CELL
1 CAPSULE ORAL
Refills: 0 | Status: DISCONTINUED | OUTPATIENT
Start: 2022-04-05 | End: 2022-04-13

## 2022-04-05 RX ORDER — ASPIRIN/CALCIUM CARB/MAGNESIUM 324 MG
81 TABLET ORAL DAILY
Refills: 0 | Status: DISCONTINUED | OUTPATIENT
Start: 2022-04-05 | End: 2022-04-13

## 2022-04-05 RX ORDER — LANOLIN ALCOHOL/MO/W.PET/CERES
3 CREAM (GRAM) TOPICAL AT BEDTIME
Refills: 0 | Status: DISCONTINUED | OUTPATIENT
Start: 2022-04-05 | End: 2022-04-13

## 2022-04-05 RX ORDER — SODIUM BICARBONATE 1 MEQ/ML
50 SYRINGE (ML) INTRAVENOUS ONCE
Refills: 0 | Status: COMPLETED | OUTPATIENT
Start: 2022-04-05 | End: 2022-04-05

## 2022-04-05 RX ORDER — CLOPIDOGREL BISULFATE 75 MG/1
75 TABLET, FILM COATED ORAL DAILY
Refills: 0 | Status: DISCONTINUED | OUTPATIENT
Start: 2022-04-05 | End: 2022-04-13

## 2022-04-05 RX ORDER — ERYTHROPOIETIN 10000 [IU]/ML
10000 INJECTION, SOLUTION INTRAVENOUS; SUBCUTANEOUS
Refills: 0 | Status: DISCONTINUED | OUTPATIENT
Start: 2022-04-05 | End: 2022-04-13

## 2022-04-05 RX ORDER — CEFTRIAXONE 500 MG/1
1000 INJECTION, POWDER, FOR SOLUTION INTRAMUSCULAR; INTRAVENOUS EVERY 24 HOURS
Refills: 0 | Status: DISCONTINUED | OUTPATIENT
Start: 2022-04-05 | End: 2022-04-06

## 2022-04-05 RX ORDER — ENOXAPARIN SODIUM 100 MG/ML
0 INJECTION SUBCUTANEOUS
Qty: 0 | Refills: 0 | DISCHARGE

## 2022-04-05 RX ORDER — SODIUM CHLORIDE 9 MG/ML
1000 INJECTION, SOLUTION INTRAVENOUS
Refills: 0 | Status: DISCONTINUED | OUTPATIENT
Start: 2022-04-05 | End: 2022-04-13

## 2022-04-05 RX ORDER — CALCIUM GLUCONATE 100 MG/ML
1 VIAL (ML) INTRAVENOUS ONCE
Refills: 0 | Status: COMPLETED | OUTPATIENT
Start: 2022-04-05 | End: 2022-04-05

## 2022-04-05 RX ORDER — VANCOMYCIN HCL 1 G
1000 VIAL (EA) INTRAVENOUS ONCE
Refills: 0 | Status: DISCONTINUED | OUTPATIENT
Start: 2022-04-05 | End: 2022-04-05

## 2022-04-05 RX ORDER — VANCOMYCIN HCL 1 G
1000 VIAL (EA) INTRAVENOUS ONCE
Refills: 0 | Status: COMPLETED | OUTPATIENT
Start: 2022-04-05 | End: 2022-04-05

## 2022-04-05 RX ORDER — INSULIN HUMAN 100 [IU]/ML
10 INJECTION, SOLUTION SUBCUTANEOUS ONCE
Refills: 0 | Status: COMPLETED | OUTPATIENT
Start: 2022-04-05 | End: 2022-04-05

## 2022-04-05 RX ORDER — METOPROLOL TARTRATE 50 MG
2.5 TABLET ORAL ONCE
Refills: 0 | Status: COMPLETED | OUTPATIENT
Start: 2022-04-05 | End: 2022-04-05

## 2022-04-05 RX ORDER — INSULIN GLARGINE 100 [IU]/ML
7 INJECTION, SOLUTION SUBCUTANEOUS AT BEDTIME
Refills: 0 | Status: DISCONTINUED | OUTPATIENT
Start: 2022-04-05 | End: 2022-04-05

## 2022-04-05 RX ORDER — METOPROLOL TARTRATE 50 MG
5 TABLET ORAL ONCE
Refills: 0 | Status: COMPLETED | OUTPATIENT
Start: 2022-04-05 | End: 2022-04-05

## 2022-04-05 RX ORDER — ONDANSETRON 8 MG/1
4 TABLET, FILM COATED ORAL EVERY 8 HOURS
Refills: 0 | Status: DISCONTINUED | OUTPATIENT
Start: 2022-04-05 | End: 2022-04-13

## 2022-04-05 RX ORDER — INSULIN LISPRO 100/ML
VIAL (ML) SUBCUTANEOUS AT BEDTIME
Refills: 0 | Status: DISCONTINUED | OUTPATIENT
Start: 2022-04-05 | End: 2022-04-13

## 2022-04-05 RX ORDER — PANTOPRAZOLE SODIUM 20 MG/1
40 TABLET, DELAYED RELEASE ORAL
Refills: 0 | Status: DISCONTINUED | OUTPATIENT
Start: 2022-04-05 | End: 2022-04-13

## 2022-04-05 RX ORDER — ACETAMINOPHEN 500 MG
650 TABLET ORAL EVERY 6 HOURS
Refills: 0 | Status: DISCONTINUED | OUTPATIENT
Start: 2022-04-05 | End: 2022-04-13

## 2022-04-05 RX ORDER — DEXTROSE 50 % IN WATER 50 %
50 SYRINGE (ML) INTRAVENOUS ONCE
Refills: 0 | Status: COMPLETED | OUTPATIENT
Start: 2022-04-05 | End: 2022-04-05

## 2022-04-05 RX ORDER — SODIUM ZIRCONIUM CYCLOSILICATE 10 G/10G
15 POWDER, FOR SUSPENSION ORAL ONCE
Refills: 0 | Status: COMPLETED | OUTPATIENT
Start: 2022-04-05 | End: 2022-04-05

## 2022-04-05 RX ORDER — INSULIN GLARGINE 100 [IU]/ML
7 INJECTION, SOLUTION SUBCUTANEOUS EVERY MORNING
Refills: 0 | Status: DISCONTINUED | OUTPATIENT
Start: 2022-04-06 | End: 2022-04-11

## 2022-04-05 RX ORDER — METOPROLOL TARTRATE 50 MG
100 TABLET ORAL DAILY
Refills: 0 | Status: DISCONTINUED | OUTPATIENT
Start: 2022-04-05 | End: 2022-04-06

## 2022-04-05 RX ORDER — DEXTROSE 50 % IN WATER 50 %
15 SYRINGE (ML) INTRAVENOUS ONCE
Refills: 0 | Status: DISCONTINUED | OUTPATIENT
Start: 2022-04-05 | End: 2022-04-13

## 2022-04-05 RX ORDER — PIPERACILLIN AND TAZOBACTAM 4; .5 G/20ML; G/20ML
3.38 INJECTION, POWDER, LYOPHILIZED, FOR SOLUTION INTRAVENOUS EVERY 12 HOURS
Refills: 0 | Status: DISCONTINUED | OUTPATIENT
Start: 2022-04-05 | End: 2022-04-05

## 2022-04-05 RX ADMIN — Medication 650 MILLIGRAM(S): at 17:17

## 2022-04-05 RX ADMIN — Medication 50 MILLIGRAM(S): at 22:14

## 2022-04-05 RX ADMIN — Medication 250 MILLIGRAM(S): at 04:02

## 2022-04-05 RX ADMIN — AMLODIPINE BESYLATE 7.5 MILLIGRAM(S): 2.5 TABLET ORAL at 20:17

## 2022-04-05 RX ADMIN — PIPERACILLIN AND TAZOBACTAM 200 GRAM(S): 4; .5 INJECTION, POWDER, LYOPHILIZED, FOR SOLUTION INTRAVENOUS at 04:02

## 2022-04-05 RX ADMIN — Medication 100 MILLIGRAM(S): at 13:12

## 2022-04-05 RX ADMIN — Medication 2.5 MILLIGRAM(S): at 13:56

## 2022-04-05 RX ADMIN — ERYTHROPOIETIN 10000 UNIT(S): 10000 INJECTION, SOLUTION INTRAVENOUS; SUBCUTANEOUS at 09:42

## 2022-04-05 RX ADMIN — Medication 667 MILLIGRAM(S): at 09:42

## 2022-04-05 RX ADMIN — SODIUM ZIRCONIUM CYCLOSILICATE 15 GRAM(S): 10 POWDER, FOR SUSPENSION ORAL at 09:43

## 2022-04-05 RX ADMIN — Medication 2.5 MILLIGRAM(S): at 14:08

## 2022-04-05 RX ADMIN — Medication 100 GRAM(S): at 04:26

## 2022-04-05 RX ADMIN — Medication 650 MILLIGRAM(S): at 22:14

## 2022-04-05 RX ADMIN — ATORVASTATIN CALCIUM 40 MILLIGRAM(S): 80 TABLET, FILM COATED ORAL at 22:14

## 2022-04-05 RX ADMIN — Medication 667 MILLIGRAM(S): at 17:29

## 2022-04-05 RX ADMIN — Medication 650 MILLIGRAM(S): at 11:27

## 2022-04-05 RX ADMIN — SODIUM POLYSTYRENE SULFONATE 30 GRAM(S): 4.1 POWDER, FOR SUSPENSION ORAL at 04:42

## 2022-04-05 RX ADMIN — Medication 5 MILLIGRAM(S): at 15:00

## 2022-04-05 RX ADMIN — CEFTRIAXONE 100 MILLIGRAM(S): 500 INJECTION, POWDER, FOR SOLUTION INTRAMUSCULAR; INTRAVENOUS at 17:29

## 2022-04-05 RX ADMIN — Medication 0.1 MILLIGRAM(S): at 22:14

## 2022-04-05 RX ADMIN — Medication 50 MILLILITER(S): at 04:25

## 2022-04-05 RX ADMIN — Medication 50 MILLIEQUIVALENT(S): at 04:26

## 2022-04-05 RX ADMIN — Medication 1 TABLET(S): at 17:29

## 2022-04-05 RX ADMIN — INSULIN HUMAN 10 UNIT(S): 100 INJECTION, SOLUTION SUBCUTANEOUS at 04:26

## 2022-04-05 RX ADMIN — Medication 650 MILLIGRAM(S): at 23:08

## 2022-04-05 RX ADMIN — HEPARIN SODIUM 5000 UNIT(S): 5000 INJECTION INTRAVENOUS; SUBCUTANEOUS at 17:28

## 2022-04-05 NOTE — H&P ADULT - TIME BILLING
75minutes spent on this visit, 50% visit time spent in care co-ordination with other attendings and counselling patient ,writing admission orders ( see complete and current orders and order section) ,requesting necessary consults ,informing family about status & plan of care .I have discussed care plan with [facility] wellness/admitting /nursing   department ,outpatient PCP , hospital consultants , ER physician & med staff .

## 2022-04-05 NOTE — CONSULT NOTE ADULT - CONVERSATION DETAILS
Spouse Geoffrey - HCP    pt is full code  wishes to cont HD   wishes an attempt Resuscitation  does not want to linger on machines

## 2022-04-05 NOTE — ED PROVIDER NOTE - OBJECTIVE STATEMENT
72yo female bib ems with sob, pt states she has missed the last 2 rounds of dialysis and is due today, and has been having worsening sob, no cough, fever, chills, no chest pain no other complaints

## 2022-04-05 NOTE — H&P ADULT - EXTREMITIES COMMENTS
Musculoskeletal: 5/5 strength in all quadrants bilaterally, AJ & STJ ROM intact  Dermatological: RIGHT 1st and 2 nd  toe  ulcerations noted to the granular wound bed, no probe to bone, no periwound erythema, no fluctuance, no malodor, no proximal streaking at this time

## 2022-04-05 NOTE — CONSULT NOTE ADULT - TIME-BASED
Protocol For Nbuvb: The patient received NBUVB.
Consent: Written consent obtained.  The risks were reviewed with the patient including but not limited to: burn, pigmentary changes, pain, blistering, scabbing, redness, increased risk of skin cancers, and the remote possibility of scarring.
Treatment Number: 16
Protocol For Broad Band Uvb: The patient received Broad Band UVB.
Protocol For Nb Uva: The patient received NB UVA.
Comments On Previous Treatment: Pt did well\\nPatient prefers to come twice weekly because she’s doing well.
60
Protocol For Photochemotherapy: Tar And Nbuvb (Goeckerman Treatment): The patient received Photochemotherapy: Tar and NBUVB (Goeckerman treatment).
Protocol For Photochemotherapy For Severe Photoresponsive Dermatoses: Tar And Nbuvb (Goeckerman Treatment): The patient received Photochemotherapy for severe photoresponsive dermatoses: Tar and NBUVB (Goeckerman treatment) requiring at least 4 to 8 hours of care under direct physician supervision.
Protocol For Photochemotherapy: Petrolatum And Broad Band Uvb: The patient received Photochemotherapy: Petrolatum and Broad Band UVB.
Protocol For Photochemotherapy For Severe Photoresponsive Dermatoses: Tar And Broad Band Uvb (Goeckerman Treatment): The patient received Photochemotherapy for severe photoresponsive dermatoses: Tar and Broad Band UVB (Goeckerman treatment) requiring at least 4 to 8 hours of care under direct physician supervision.
Detail Level: Generalized
Protocol For Photochemotherapy: Mineral Oil And Broad Band Uvb: The patient received Photochemotherapy: Mineral Oil and Broad Band UVB.
Protocol For Bath Puva: The patient received Bath PUVA.
Total Body Energy: 450
Protocol For Uva1: The patient received UVA1.
Protocol For Photochemotherapy For Severe Photoresponsive Dermatoses: Puva: The patient received Photochemotherapy for severe photoresponsive dermatoses: PUVA requiring at least 4 to 8 hours of care under direct physician supervision.
Protocol For Uva: The patient received UVA.
Protocol: NBUVB
Protocol For Photochemotherapy: Triamcinolone Ointment And Nbuvb: The patient received Photochemotherapy: Triamcinolone and NBUVB (triamcinolone ointment applied to all lesions prior to phototherapy).
Changes In Treatment Protocol: Maintenance dose
Protocol For Photochemotherapy For Severe Photoresponsive Dermatoses: Petrolatum And Nbuvb: The patient received Photochemotherapy for severe photoresponsive dermatoses: Petrolatum and NBUVB requiring at least 4 to 8 hours of care under direct physician supervision.
Protocol For Puva: The patient received PUVA.
Total Body Time: 1:40
Protocol For Photochemotherapy: Mineral Oil And Nbuvb: The patient received Photochemotherapy: Mineral Oil and NBUVB (mineral oil applied to all lesions prior to phototherapy).
Post-Care Instructions: I reviewed with the patient in detail post-care instructions. Patient is to wear sun protection. Patients may expect sunburn like redness, discomfort and scabbing.
Protocol For Protocol For Photochemotherapy For Severe Photoresponsive Dermatoses: Bath Puva: The patient received Photochemotherapy for severe photoresponsive dermatoses: Bath PUVA requiring at least 4 to 8 hours of care under direct physician supervision.
Render Post-Care In The Note: no
Name Of Supervising Technician: PORTIA
Protocol For Photochemotherapy: Baby Oil And Nbuvb: The patient received Photochemotherapy: Baby Oil and NBUVB (baby oil applied to all lesions prior to phototherapy).
Protocol For Photochemotherapy For Severe Photoresponsive Dermatoses: Petrolatum And Broad Band Uvb: The patient received Photochemotherapyfor severe photoresponsive dermatoses: Petrolatum and Broad Band UVB requiring at least 4 to 8 hours of care under direct physician supervision.
Protocol For Photochemotherapy: Tar And Broad Band Uvb (Goeckerman Treatment): The patient received Photochemotherapy: Tar and Broad Band UVB (Goeckerman treatment).
Protocol For Photochemotherapy: Petrolatum And Nbuvb: The patient received Photochemotherapy: Petrolatum and NBUVB (petrolatum applied to all lesions prior to phototherapy).

## 2022-04-05 NOTE — CHART NOTE - NSCHARTNOTEFT_GEN_A_CORE
Called by RN for patient with increasing O2 demand. Prior events during hospital course today noted. Pt has been on 3L NC since prior to arriving to the floors per RN. Though was found to have O2 desat to 70s, which was then upgraded to venti-mask only satting 80s on 50%. Pt seen and examined at bedside. Appearing comfortable. Denies any chest pain, palpitations, dizziness, or headaches.     T(C): 38.2 (04-05-22 @ 22:10), Max: 38.2 (04-05-22 @ 22:10)  HR: 99 (04-05-22 @ 22:10) (91 - 124)  BP: 132/71 (04-05-22 @ 22:10) (132/68 - 192/88)  RR: 20 (04-05-22 @ 22:10) (16 - 20)  SpO2: 95% (04-05-22 @ 22:10) (93% - 99%)    Gen- NAD, comfortable appearing  HEENT: PERRL b/l, patient unable to cooperate w/ EOM exam, symmetric smile   Cardio - s1, s2, irregular  Lung - cta b/l, no wheeze, no rhonchi, mild rales in b/l posterior inferior lung fields  Abdomen- +BS, NT/ND, no guarding, no rebound, no masses  Ext- no edema, no clubbing, no cyanosis, weak pedal pulses b/l, right toe cellulitis/lesion, left medial malleoli ulcer Called by RN for patient with increasing O2 demand. Prior events during hospital course today noted. Pt has been on 3L NC since prior to arriving to the floors per RN. Though was found to have O2 desat to 70s, which was then upgraded to venti-mask only satting 80s on 50%. Pt seen and examined at bedside. Appearing comfortable. Denies any chest pain, palpitations, dizziness, or headaches.     T(C): 38.2 (04-05-22 @ 22:10), Max: 38.2 (04-05-22 @ 22:10)  HR: 99 (04-05-22 @ 22:10) (91 - 124)  BP: 132/71 (04-05-22 @ 22:10) (132/68 - 192/88)  RR: 20 (04-05-22 @ 22:10) (16 - 20)  SpO2: 95% (04-05-22 @ 22:10) (93% - 99%)    Gen- NAD, comfortable appearing  Cardio - s1, s2, irregular  Lung - cta b/l, no wheezing or rhonchi appreciated  Abdomen- +Bowel sounds, non-distended, no guarding, no rebound  Ext- no edema, no clubbing, no cyanosis; b/l foot wound dressing    A/P:     - Pt with fever, T 100.8 rectal, suspect possibly 2/2 PNA, CXR from earlier today showing new pulmonary infiltrates   - Blood cx ordered, f/u results  - Added UA/UCx as well  - Continue remote tele, continuous O2 monitoring  - Tylenol prn for fevers  - Continue to wean O2 as tolerated  - Currently on empiric IV rocephin, ID following Called by RN for patient with increasing O2 demand. Prior events during hospital course today noted. Pt has been on 3L NC since prior to arriving to the floors per RN. Though was found to have O2 desat to 70s, which was then upgraded to venti-mask only satting 80s on 50%. Pt seen and examined at bedside. Appearing comfortable. Denies any chest pain, palpitations, dizziness, or headaches.     T(C): 38.2 (04-05-22 @ 22:10), Max: 38.2 (04-05-22 @ 22:10)  HR: 99 (04-05-22 @ 22:10) (91 - 124)  BP: 132/71 (04-05-22 @ 22:10) (132/68 - 192/88)  RR: 20 (04-05-22 @ 22:10) (16 - 20)  SpO2: 95% (04-05-22 @ 22:10) (93% - 99%)    Gen- NAD, comfortable appearing  Cardio - s1, s2, irregular  Lung - cta b/l, no wheezing or rhonchi appreciated  Abdomen- +Bowel sounds, non-distended, no guarding, no rebound  Ext- no edema, no clubbing, no cyanosis; b/l foot wound dressing    A/P: 70yo female bib ems with sob, s/p missed dialysis.     - Pt with fever, T 100.8 rectal, suspect possibly 2/2 PNA, CXR from earlier today showing new pulmonary infiltrates   - In spite of increasing O2 demand, and radiographic findings, suspect acute respiratory failure is due to PNA - HR 80s on tele-monitor, low suspicion for PE   - Blood cx ordered, f/u results  - Added UA/UCx as well  - Continue remote tele, continuous O2 monitoring  - Tylenol prn for fevers  - Continue to wean O2 as tolerated  - Currently on empiric IV rocephin, ID following  - Attempted to call Dr. Larson - left message for service, no callback received Called by RN for patient with increasing O2 demand. Prior events during hospital course today noted. Pt has been on 3L NC since prior to arriving to the floors per RN. Though was found to have O2 desat to 70s, which was then upgraded to venti-mask only satting 80s on 50%. Pt seen and examined at bedside. Appearing comfortable. Denies any chest pain, palpitations, dizziness, or headaches.     T(C): 38.2 (04-05-22 @ 22:10), Max: 38.2 (04-05-22 @ 22:10)  HR: 99 (04-05-22 @ 22:10) (91 - 124)  BP: 132/71 (04-05-22 @ 22:10) (132/68 - 192/88)  RR: 20 (04-05-22 @ 22:10) (16 - 20)  SpO2: 95% (04-05-22 @ 22:10) (93% - 99%)    Gen- NAD, comfortable appearing  Cardio - s1, s2, irregular  Lung - cta b/l, no wheezing or rhonchi appreciated  Abdomen- +Bowel sounds, non-distended, no guarding, no rebound  Ext- no edema, no clubbing, no cyanosis; b/l foot wound dressing    A/P: 70yo female bib ems with sob, s/p missed dialysis.     - Pt with fever, T 100.8 rectal, suspect possibly 2/2 PNA, CXR from earlier today showing new pulmonary infiltrates   - In spite of increasing O2 demand, and radiographic findings, suspect acute respiratory failure is due to PNA - HR 80s on tele-monitor, low suspicion for PE   - Blood cx ordered, f/u results  - Added UA/UCx as well  - Continue remote tele, continuous O2 monitoring  - Tylenol prn for fevers  - Continue to wean O2 as tolerated  - Currently on empiric IV rocephin, ID following  - Discussed with Dr. Larson Called by RN for patient with increasing O2 demand. Prior events during hospital course today noted. Pt has been on 3L NC since prior to arriving to the floors per RN. Though was found to have O2 desat to 70s, which was then upgraded to venti-mask only satting 80s on 50%. Pt seen and examined at bedside. Appearing comfortable. Denies any chest pain, palpitations, dizziness, or headaches.     T(C): 38.2 (04-05-22 @ 22:10), Max: 38.2 (04-05-22 @ 22:10)  HR: 99 (04-05-22 @ 22:10) (91 - 124)  BP: 132/71 (04-05-22 @ 22:10) (132/68 - 192/88)  RR: 20 (04-05-22 @ 22:10) (16 - 20)  SpO2: 95% (04-05-22 @ 22:10) (93% - 99%)    Gen- NAD, comfortable appearing  Cardio - s1, s2, irregular  Lung - cta b/l, no wheezing or rhonchi appreciated  Abdomen- +Bowel sounds, non-distended, no guarding, no rebound  Ext- no edema, no clubbing, no cyanosis; b/l foot wound dressing    A/P: 70yo female bib ems with sob, s/p missed dialysis.     - Pt with fever, T 100.8 rectal, suspect possibly 2/2 PNA, CXR from earlier today showing new pulmonary infiltrates   - In spite of increasing O2 demand, and radiographic findings, suspect acute respiratory failure is due to PNA, worsened by missed Dialysis- HR 80s on tele-monitor, low suspicion for PE   - Blood cx ordered, f/u results  - Added UA/UCx as well  - CXR ordered, f/u results  - ABG ordered for the AM  - Continue remote tele, continuous O2 monitoring  - Tylenol prn for fevers  - Continue to wean O2 as tolerated  - Currently on empiric IV rocephin, ID following  - Discussed with Dr. Larson who agrees with above Called by RN for patient with increasing O2 demand. Prior events during hospital course today noted. Pt has been on 3L NC since prior to arriving to the floors per RN. Though was found to have O2 desat to 70s, which was then upgraded to venti-mask only satting 80s on 50%. Pt seen and examined at bedside. Appearing comfortable. Denies any chest pain, palpitations, dizziness, or headaches.     T(C): 38.2 (04-05-22 @ 22:10), Max: 38.2 (04-05-22 @ 22:10)  HR: 99 (04-05-22 @ 22:10) (91 - 124)  BP: 132/71 (04-05-22 @ 22:10) (132/68 - 192/88)  RR: 20 (04-05-22 @ 22:10) (16 - 20)  SpO2: 95% (04-05-22 @ 22:10) (93% - 99%)    Gen- NAD, comfortable appearing  Cardio - s1, s2, irregular  Lung - cta b/l, no wheezing or rhonchi appreciated  Abdomen- +Bowel sounds, non-distended, no guarding, no rebound  Ext- no edema, no clubbing, no cyanosis; b/l foot wound dressing    A/P: 70yo female bib ems with sob, s/p missed dialysis.     - Pt with fever, T 100.8 rectal, suspect possibly 2/2 PNA, CXR from earlier today showing new pulmonary infiltrates   - In spite of increasing O2 demand, and radiographic findings, suspect acute respiratory failure is due to PNA, worsened volume OL due to missed Dialysis- HR 80s on tele-monitor, low suspicion for PE   - Blood cx ordered, f/u results  - Added UA/UCx as well  - CXR ordered, f/u results  - ABG ordered for the AM  - Continue remote tele, continuous O2 monitoring  - Tylenol prn for fevers  - Continue to wean O2 as tolerated  - Currently on empiric IV rocephin, ID following  - Discussed with Dr. Larson who agrees with above Called by RN for patient with increasing O2 demand. Prior events during hospital course today noted. Pt has been on 3L NC since prior to arriving to the floors per RN. Though was found to have O2 desat to 70s, which was then upgraded to venti-mask only satting 80s on 50%. Pt seen and examined at bedside. Appearing comfortable. Denies any chest pain, palpitations, dizziness, or headaches.     T(C): 38.2 (04-05-22 @ 22:10), Max: 38.2 (04-05-22 @ 22:10)  HR: 99 (04-05-22 @ 22:10) (91 - 124)  BP: 132/71 (04-05-22 @ 22:10) (132/68 - 192/88)  RR: 20 (04-05-22 @ 22:10) (16 - 20)  SpO2: 95% (04-05-22 @ 22:10) (93% - 99%)    Gen- NAD, comfortable appearing  Cardio - s1, s2, irregular  Lung - cta b/l, no wheezing or rhonchi appreciated  Abdomen- +Bowel sounds, non-distended, no guarding, no rebound  Ext- no edema, no clubbing, no cyanosis; b/l foot wound dressing    A/P: 70yo female bib ems with sob, s/p missed dialysis.     - Pt with fever, T 100.8 rectal, suspect possibly 2/2 PNA, CXR from earlier today showing new pulmonary infiltrates   - In spite of increasing O2 demand, and radiographic findings, suspect acute respiratory failure is due to PNA, worsened volume OL due to missed Dialysis- HR 80s on tele-monitor, low suspicion for PE   - Blood cx ordered, f/u results  - Added UA/UCx as well  - CXR(personal read) does not appear significantly different from prior   - ABG ordered for the AM  - Continue remote tele, continuous O2 monitoring  - Tylenol prn for fevers  - Continue to wean O2 as tolerated  - Currently on empiric IV rocephin, ID following  - Discussed with Dr. Larson who agrees with above

## 2022-04-05 NOTE — CONSULT NOTE ADULT - SUBJECTIVE AND OBJECTIVE BOX
[facility]  INFECTIOUS DISEASES - [address], [place] [address]  Tel: [phone]     Fax: [phone]  =======================================================    [facility]-[id number]  SHILO KOHLER     CC: Patient is a 71y old  Female who presents with a chief complaint of shortness of breath (05 Apr 2022 10:05)    HPI:  70yo female with hx of ESRD on HD, who presented with sob. Patient stated that she has missed the last 2 rounds of dialysis prior to oming. She denies any fevers, chills and has minimal cough. She is not very certain how long the wounds on her right foot have been there but does not think they have been there for "too long". She has some pain on right foot but denies pain elsewhere. She also notes diarrhea for a few days.      PAST MEDICAL & SURGICAL HISTORY:  Diabetes mellitus II    HTN (hypertension)    h/o Anxiety attack    Depression    h/o Myocardial infarct 2007    CAD (coronary artery disease)    h/o Hepatitis A 1969  currently resolved, no symptoms    PAD (peripheral artery disease)    Murmur, cardiac    h/o Smoking  quitted 3/2012    CRF (chronic renal failure), unspecified stage    Dialysis patient    Anemia secondary to renal failure    HTN (hypertension)    coronary stent 2007    s/p Ovarian cyst removal    s/p surgical removal of benign Skin lesion epigastric area        Social Hx:     FAMILY HISTORY:      Allergies    latex (Unknown)  No Known Drug Allergies    Intolerances        Antibiotics:  MEDICATIONS  (STANDING):  amLODIPine   Tablet 5 milliGRAM(s) Oral daily  aspirin enteric coated 81 milliGRAM(s) Oral daily  atorvastatin 40 milliGRAM(s) Oral at bedtime  calcium acetate 667 milliGRAM(s) Oral three times a day with meals  cloNIDine 0.1 milliGRAM(s) Oral at bedtime  clopidogrel Tablet 75 milliGRAM(s) Oral daily  dextrose 5%. 1000 milliLiter(s) (100 mL/Hr) IV Continuous <Continuous>  dextrose 5%. 1000 milliLiter(s) (50 mL/Hr) IV Continuous <Continuous>  dextrose 50% Injectable 25 Gram(s) IV Push once  dextrose 50% Injectable 12.5 Gram(s) IV Push once  dextrose 50% Injectable 25 Gram(s) IV Push once  epoetin fawad-epbx (RETACRIT) Injectable 26946 Unit(s) IV Push <User Schedule>  glucagon  Injectable 1 milliGRAM(s) IntraMuscular once  heparin   Injectable 5000 Unit(s) SubCutaneous every 12 hours  insulin lispro (ADMELOG) corrective regimen sliding scale   SubCutaneous three times a day before meals  insulin regular  human recombinant 10 Unit(s) SubCutaneous Once  metoprolol succinate  milliGRAM(s) Oral daily  multivitamin 1 Tablet(s) Oral daily  pantoprazole    Tablet 40 milliGRAM(s) Oral before breakfast  piperacillin/tazobactam IVPB.. 3.375 Gram(s) IV Intermittent every 12 hours    MEDICATIONS  (PRN):  acetaminophen     Tablet .. 650 milliGRAM(s) Oral every 6 hours PRN Temp greater or equal to 38C (100.4F), Mild Pain (1 - 3)  aluminum hydroxide/magnesium hydroxide/simethicone Suspension 30 milliLiter(s) Oral every 4 hours PRN Dyspepsia  dextrose Oral Gel 15 Gram(s) Oral once PRN Blood Glucose LESS THAN 70 milliGRAM(s)/deciliter  melatonin 3 milliGRAM(s) Oral at bedtime PRN Insomnia  ondansetron Injectable 4 milliGRAM(s) IV Push every 8 hours PRN Nausea and/or Vomiting       REVIEW OF SYSTEMS:  CONSTITUTIONAL:  No Fever or chills  HEENT:  No sore throat or runny nose.  CARDIOVASCULAR:  No chest pain  RESPIRATORY: see history  GASTROINTESTINAL:  No nausea, vomiting. no abdominal pain  GENITOURINARY:  No dysuria, frequency or urgency.  MUSCULOSKELETAL:  no back pain  NEUROLOGIC:  No headache, no dizziness  PSYCHIATRIC:  No disorder of thought or mood.      Physical Exam:  Vital Signs Last 24 Hrs  T(C): 36.8 (05 Apr 2022 10:10), Max: 37.1 (05 Apr 2022 02:27)  T(F): 98.2 (05 Apr 2022 10:10), Max: 98.7 (05 Apr 2022 02:27)  HR: 108 (05 Apr 2022 10:10) (91 - 108)  BP: 192/88 (05 Apr 2022 10:10) (132/68 - 192/88)  BP(mean): --  RR: 19 (05 Apr 2022 10:10) (16 - 20)  SpO2: 93% (05 Apr 2022 10:10) (93% - 99%)  Height (cm): 175.3 (04-05 @ 02:27)  Weight (kg): 63.5 (04-05 @ 02:27)  BMI (kg/m2): 20.7 (04-05 @ 02:27)  BSA (m2): 1.78 (04-05 @ 02:27)  GEN: NAD  HEENT: normocephalic and atraumatic.     NECK: Supple.   LUNGS: Clear to auscultation.  HEART: Regular rate and rhythm  ABDOMEN: Soft, nontender, and nondistended.   EXTREMITIES: No knee swelling, no leg edema. RUE AV fistula  NEUROLOGIC: slow to answer some questions but AOx3  PSYCHIATRIC: Appropriate affect .  SKIN: R 1st and 2nd toe ulcers, no drainage, minimal surrounding erythema but not swelling noted  left medial heel ulcer, no drainage, no surrounding swelling or erythema    Labs:  04-05    x   |  x   |  x   ----------------------------<  x   7.5<HH>   |  x   |  x     Ca    9.0      05 Apr 2022 03:35    TPro  7.7  /  Alb  3.3  /  TBili  0.6  /  DBili  x   /  AST  14<L>  /  ALT  18  /  AlkPhos  96  04-05                          10.6   21.36 )-----------( 377      ( 05 Apr 2022 03:35 )             31.4     PT/INR - ( 05 Apr 2022 03:35 )   PT: 12.8 sec;   INR: 1.09 ratio         PTT - ( 05 Apr 2022 03:35 )  PTT:28.6 sec    LIVER FUNCTIONS - ( 05 Apr 2022 03:35 )  Alb: 3.3 g/dL / Pro: 7.7 g/dL / ALK PHOS: 96 U/L / ALT: 18 U/L / AST: 14 U/L / GGT: x                       COVID-19 PCR: NotDetec (04-05-22 @ 03:44)      RECENT CULTURES:  03-30 @ 18:08 .Tissue Other, Left Medial Ankle Klebsiella oxytoca /Raoutella ornithinolytica  Escherichia coli  Staphylococcus aureus    Few Klebsiella oxytoca/Raoutella ornithinolytica  Few Escherichia coli  Moderate Staphylococcus aureus    Few polymorphonuclear leukocytes per low power field  Moderate Gram positive cocci in pairs per oil power field  Rare Gram Negative Rods per oil power field            All imaging and other data have been reviewed.

## 2022-04-05 NOTE — H&P ADULT - ADDITIONAL PE
R 1st and 2nd toe ulcers, no drainage, minimal surrounding erythema but not swelling noted  left medial heel ulcer, no drainage, no surrounding swelling or erythema

## 2022-04-05 NOTE — ED ADULT NURSE NOTE - NSIMPLEMENTINTERV_GEN_ALL_ED
Implemented All Fall Risk Interventions:  [place] to call system. Call bell, personal items and telephone within reach. Instruct patient to call for assistance. Room bathroom lighting operational. Non-slip footwear when patient is off stretcher. Physically safe environment: no spills, clutter or unnecessary equipment. Stretcher in lowest position, wheels locked, appropriate side rails in place. Provide visual cue, wrist band, yellow gown, etc. Monitor gait and stability. Monitor for mental status changes and reorient to person, place, and time. Review medications for side effects contributing to fall risk. Reinforce activity limits and safety measures with patient and family.

## 2022-04-05 NOTE — CONSULT NOTE ADULT - ASSESSMENT
HPI:  70yo female bib ems with sob, pt states she has missed the last 2 rounds of dialysis and is due today, and has been having worsening sob, no cough, fever, chills, no chest pain no other complaints .Found to have hyperkalemia Admitted  to telemetry unit for monitoring , send 3 sets of cardiac enzymes to rule out acute coronary event, obtain ECHO to evaluate LVEF, cardiology consult  ,continue current management, O2 supply, anticoagulation plan as per cardiology consult Nephrology consult stat requested ,management d/w Dr Perez and  Palliative care consult requested ,to discuss advance directives and complete MOLST  (05 Apr 2022 07:22)        esrd on hd   Excess fluids and waste products will be removed from your blood; your electrolytes will be balanced; your blood pressure will be controlled.         70yo female bib ems with sob, pt states she has missed the last 2 rounds of dialysis and is due today, and has been having worsening sob, no cough, fever, chills, no chest pain no other complaints .Found to have hyperkalemia Admitted  to telemetry unit for monitoring , send 3 sets of cardiac enzymes to rule out acute coronary event, obtain ECHO to evaluate LVEF, cardiology consult  ,continue current management, O2 supply, anticoagulation plan as per cardiology consult Nephrology consult stat requested ,management d/w Dr Perez and  Palliative care consult requested ,to discuss advance directives and complete MOLST  (05 Apr 2022 07:22)        esrd on hd   Excess fluids and waste products will be removed from your blood; your electrolytes will be balanced; your blood pressure will be controlled.      ANEMIA PLAN:  Anemia of chronic disease:  Well controlled by Aranesp  H and H subtherapeutic .  We will continue Aranesp aiming for a HCT of 32-36 %.   We will monitor Iron stores, B12 and RBC folate .      hyperkalemia on low k bath dialysis

## 2022-04-05 NOTE — CHART NOTE - NSCHARTNOTEFT_GEN_A_CORE
Resident Rapid Response Note    Patient is a 71y old  Female who presents with a chief complaint of shortness of breath (05 Apr 2022 12:34)      Rapid response was called at 2:47pm on this 71y Female patient for tachycardia.    Events leading up to rapid:    Patient admitted today by Dr. Larson for hyperkalemia, renal failure due to missed HD sessions. Pt received hemodialysis today. Earlier this afternoon pt had afib with RVR and received lopressor 2.5mg IV x2. STAT labs were ordered and plan was discussed with Dr. Larson who said cardio Dr. James was aware.    Patient was seen and examined at the bedside by the rapid response team and primary team. Intensivist Dr. Ag and ICU PA at bedside.     Rapid Response Vital Signs:  BP: 161/83  HR: 136  RR: 22  SpO2: 97% on 2L NC   Temp: 98.8F  FS: 167    Vital Signs Last 24 Hrs  T(C): 36.7 (05 Apr 2022 13:20), Max: 37.1 (05 Apr 2022 02:27)  T(F): 98.1 (05 Apr 2022 13:20), Max: 98.7 (05 Apr 2022 02:27)  HR: 124 (05 Apr 2022 13:20) (91 - 124)  BP: 150/80 (05 Apr 2022 13:20) (132/68 - 192/88)  BP(mean): --  RR: 18 (05 Apr 2022 13:20) (16 - 20)  SpO2: 98% (05 Apr 2022 13:20) (93% - 99%)    Physical Exam:  General: Well developed, well nourished, in no acute distress  HEENT: NCAT, PERRLA, EOMI bl, moist mucous membranes   Neck: Supple, nontender, no mass  Neurology: A&Ox3, nonfocal, CN II-XII grossly intact, sensation intact, no gait abnormalities   Respiratory: CTA B/L, No wheezing, rales, or rhonchi  CV: RRR, S1/S2 present, no murmurs, rubs, or gallops  Abdominal: Soft, nontender, non-distended, normoactive bowel sounds  Extremities: No C/C/E, peripheral pulses present  MSK: Normal ROM, no joint erythema or warmth, no joint swelling   Skin: warm, dry, normal color, no obvious rash or abnormal lesions    LABS:                        10.6   21.36 )-----------( 377      ( 05 Apr 2022 03:35 )             31.4     05 Apr 2022 06:58    x      |  x      |  x      ----------------------------<  x      7.5     |  x      |  x        Ca    9.0        05 Apr 2022 03:35    TPro  7.7    /  Alb  3.3    /  TBili  0.6    /  DBili  x      /  AST  14     /  ALT  18     /  AlkPhos  96     05 Apr 2022 03:35    PT/INR - ( 05 Apr 2022 03:35 )   PT: 12.8 sec;   INR: 1.09 ratio         PTT - ( 05 Apr 2022 03:35 )  PTT:28.6 sec    CAPILLARY BLOOD GLUCOSE      POCT Blood Glucose.: 184 mg/dL (05 Apr 2022 11:49)  POCT Blood Glucose.: 380 mg/dL (05 Apr 2022 08:34)  POCT Blood Glucose.: 295 mg/dL (05 Apr 2022 04:24)        RADIOLOGY & ADDITIONAL TESTS:      Assessment/Plan:  - pt with known afib with RVR cardio Dr. James is aware  - stat EKG showing afib @ 139bpm  - stat labs ordered previously - CMP, Mg, P - f/u  - pt received lopressor 2.5mg IV x2 earlier this afternoon - giving additional lopressor 5mg IV push now  - continue tele monitoring  - discussed with Dr. Ag. Discussed with Dr. Larson  - to follow up Resident Rapid Response Note    Patient is a 71y old  Female who presents with a chief complaint of shortness of breath (05 Apr 2022 12:34)      Rapid response was called at 2:47pm on this 71y Female patient for tachycardia.    Events leading up to rapid:    Patient admitted today by Dr. Larson for hyperkalemia, renal failure due to missed HD sessions. Pt received hemodialysis today. Earlier this afternoon pt had afib with RVR and received lopressor 2.5mg IV x2. STAT labs were ordered and plan was discussed with Dr. Larson who said cardio Dr. James was aware.    Patient was seen and examined at the bedside by the rapid response team and primary team. Intensivist Dr. Ag and ICU PA at bedside. Patient denied any lightheadedness, dizziness, CP/pressure/palpitations, abd pain, weakness, n/v/, fevers/chills.     Rapid Response Vital Signs:  BP: 161/83  HR: 136  RR: 22  SpO2: 97% on 2L NC   Temp: 98.8F  FS: 167    Vital Signs Last 24 Hrs  T(C): 36.7 (05 Apr 2022 13:20), Max: 37.1 (05 Apr 2022 02:27)  T(F): 98.1 (05 Apr 2022 13:20), Max: 98.7 (05 Apr 2022 02:27)  HR: 124 (05 Apr 2022 13:20) (91 - 124)  BP: 150/80 (05 Apr 2022 13:20) (132/68 - 192/88)  BP(mean): --  RR: 18 (05 Apr 2022 13:20) (16 - 20)  SpO2: 98% (05 Apr 2022 13:20) (93% - 99%)    Physical Exam:  Gen- NAD, ncat, confused  HEENT: PERRL b/l, patient unable to cooperate w/ EOM exam, symmetric smile   Cardio - s1, s2, rrr, no rubs, murmurs or gallops   Lung - cta b/l, no wheeze, no rhonchi, mild rales in b/l posterior inferior lung fields  Abdomen- +BS, NT/ND, no guarding, no rebound, no masses  Ext- no edema, no clubbing, no cyanosis, weak pedal pulses b/l, right toe cellulitis/lesion, left medial malleoli ulcer  Neuro- alert and oriented x1 (person), CN V, VII- XII in tact, CN II, III, IV, VI unable to properly assess, no sensory deficit, good muscle tone in b/l UE and b/l LE        LABS:                        10.6   21.36 )-----------( 377      ( 05 Apr 2022 03:35 )             31.4     05 Apr 2022 06:58    x      |  x      |  x      ----------------------------<  x      7.5     |  x      |  x        Ca    9.0        05 Apr 2022 03:35    TPro  7.7    /  Alb  3.3    /  TBili  0.6    /  DBili  x      /  AST  14     /  ALT  18     /  AlkPhos  96     05 Apr 2022 03:35    PT/INR - ( 05 Apr 2022 03:35 )   PT: 12.8 sec;   INR: 1.09 ratio         PTT - ( 05 Apr 2022 03:35 )  PTT:28.6 sec    CAPILLARY BLOOD GLUCOSE      POCT Blood Glucose.: 184 mg/dL (05 Apr 2022 11:49)  POCT Blood Glucose.: 380 mg/dL (05 Apr 2022 08:34)  POCT Blood Glucose.: 295 mg/dL (05 Apr 2022 04:24)        RADIOLOGY & ADDITIONAL TESTS:  No studies performed     Assessment/Plan:  - pt with known afib with RVR cardio Dr. James is aware  - stat EKG showing afib @ 139bpm  - stat labs ordered previously - CMP, Mg, P - f/u  - pt received lopressor 2.5mg IV x2 earlier this afternoon - giving additional lopressor 5mg IV push now  - continue tele monitoring  - discussed with Dr. Ag. Discussed with Dr. Larson  - to follow up Resident Rapid Response Note    Patient is a 71y old  Female who presents with a chief complaint of shortness of breath (05 Apr 2022 12:34)    Rapid response was called at 2:47pm on this 71y Female patient for tachycardia.    Events leading up to rapid:    Patient admitted today by Dr. Larson for hyperkalemia, renal failure due to missed HD sessions. Pt received hemodialysis today. Earlier this afternoon pt had afib with RVR and received lopressor 2.5mg IV x2. STAT labs were ordered and plan was discussed with Dr. Larson who said cardio Dr. James was aware.    Patient was seen and examined at the bedside by the rapid response team and primary team. Intensivist Dr. Ag and ICU PA at bedside. Patient denied any lightheadedness, dizziness, CP/pressure/palpitations, abd pain, weakness, n/v/, fevers/chills.     Rapid Response Vital Signs:  BP: 161/83  HR: 136  RR: 22  SpO2: 97% on 2L NC   Temp: 98.8F  FS: 167    Vital Signs Last 24 Hrs  T(C): 36.7 (05 Apr 2022 13:20), Max: 37.1 (05 Apr 2022 02:27)  T(F): 98.1 (05 Apr 2022 13:20), Max: 98.7 (05 Apr 2022 02:27)  HR: 124 (05 Apr 2022 13:20) (91 - 124)  BP: 150/80 (05 Apr 2022 13:20) (132/68 - 192/88)  BP(mean): --  RR: 18 (05 Apr 2022 13:20) (16 - 20)  SpO2: 98% (05 Apr 2022 13:20) (93% - 99%)    Physical Exam:  Gen- NAD, ncat, confused  HEENT: PERRL b/l, patient unable to cooperate w/ EOM exam, symmetric smile   Cardio - s1, s2, rrr, no rubs, murmurs or gallops   Lung - cta b/l, no wheeze, no rhonchi, mild rales in b/l posterior inferior lung fields  Abdomen- +BS, NT/ND, no guarding, no rebound, no masses  Ext- no edema, no clubbing, no cyanosis, weak pedal pulses b/l, right toe cellulitis/lesion, left medial malleoli ulcer  Neuro- alert and oriented x1 (person), CN V, VII- XII in tact, CN II, III, IV, VI unable to properly assess, no sensory deficit, good muscle tone in b/l UE and b/l LE      LABS:                        10.6   21.36 )-----------( 377      ( 05 Apr 2022 03:35 )             31.4     05 Apr 2022 06:58    x      |  x      |  x      ----------------------------<  x      7.5     |  x      |  x        Ca    9.0        05 Apr 2022 03:35    TPro  7.7    /  Alb  3.3    /  TBili  0.6    /  DBili  x      /  AST  14     /  ALT  18     /  AlkPhos  96     05 Apr 2022 03:35    PT/INR - ( 05 Apr 2022 03:35 )   PT: 12.8 sec;   INR: 1.09 ratio         PTT - ( 05 Apr 2022 03:35 )  PTT:28.6 sec    CAPILLARY BLOOD GLUCOSE  POCT Blood Glucose.: 184 mg/dL (05 Apr 2022 11:49)  POCT Blood Glucose.: 380 mg/dL (05 Apr 2022 08:34)  POCT Blood Glucose.: 295 mg/dL (05 Apr 2022 04:24)    RADIOLOGY & ADDITIONAL TESTS:  No studies performed     Assessment/Plan:  - pt with known afib with RVR cardio Dr. James is aware  - stat EKG showing afib @ 139bpm  - stat labs ordered previously - CMP, Mg, P - f/u  - pt received lopressor 2.5mg IV x2 earlier this afternoon - giving additional lopressor 5mg IV push now  - continue tele monitoring  - discussed with Dr. Ag. Discussed with Dr. Larson  - to follow up    RRT follow-up:  Pt seen and examined at the bedside. She denies any chest pain, palpitations, shortness of breath at this time. She has no complaints or concerns. D/w RN, no new changes at this time.    Vital signs:  HR 98  SpO2 100% on 3L NC  /96  RR 18    A/P  - Pt's repeat EKG reviewed, in NSR with sinus arrythmia  - K 3.9, Phos 4.9  - F/u AM labs  - RN to call with any changes  - Monitor rate and rhythm on tele Resident Rapid Response Note    Patient is a 71y old  Female who presents with a chief complaint of shortness of breath (05 Apr 2022 12:34)    Rapid response was called at 2:47pm on this 71y Female patient for tachycardia.    Events leading up to rapid:    Patient admitted today by Dr. Larson for hyperkalemia, renal failure due to missed HD sessions. Pt received hemodialysis today. Earlier this afternoon pt had afib with RVR and received lopressor 2.5mg IV x2. STAT labs were ordered and plan was discussed with Dr. Larson who said cardio Dr. James was aware.    Patient was seen and examined at the bedside by the rapid response team and primary team. Intensivist Dr. Ag and ICU PA at bedside. Patient denied any lightheadedness, dizziness, CP/pressure/palpitations, abd pain, weakness, n/v/, fevers/chills.     Rapid Response Vital Signs:  BP: 161/83  HR: 136  RR: 22  SpO2: 97% on 2L NC   Temp: 98.8F  FS: 167    Vital Signs Last 24 Hrs  T(C): 36.7 (05 Apr 2022 13:20), Max: 37.1 (05 Apr 2022 02:27)  T(F): 98.1 (05 Apr 2022 13:20), Max: 98.7 (05 Apr 2022 02:27)  HR: 124 (05 Apr 2022 13:20) (91 - 124)  BP: 150/80 (05 Apr 2022 13:20) (132/68 - 192/88)  BP(mean): --  RR: 18 (05 Apr 2022 13:20) (16 - 20)  SpO2: 98% (05 Apr 2022 13:20) (93% - 99%)    Physical Exam:  Gen- NAD, ncat, confused  HEENT: PERRL b/l, patient unable to cooperate w/ EOM exam, symmetric smile   Cardio - s1, s2, irregularly irregular  Lung - cta b/l, no wheeze, no rhonchi, mild rales in b/l posterior inferior lung fields  Abdomen- +BS, NT/ND, no guarding, no rebound, no masses  Ext- no edema, no clubbing, no cyanosis, weak pedal pulses b/l, right toe cellulitis/lesion, left medial malleoli ulcer  Neuro- alert and oriented x1 (person), CN V, VII- XII in tact, CN II, III, IV, VI unable to properly assess, no sensory deficit, good muscle tone in b/l UE and b/l LE      LABS:                        10.6   21.36 )-----------( 377      ( 05 Apr 2022 03:35 )             31.4     05 Apr 2022 06:58    x      |  x      |  x      ----------------------------<  x      7.5     |  x      |  x        Ca    9.0        05 Apr 2022 03:35    TPro  7.7    /  Alb  3.3    /  TBili  0.6    /  DBili  x      /  AST  14     /  ALT  18     /  AlkPhos  96     05 Apr 2022 03:35    PT/INR - ( 05 Apr 2022 03:35 )   PT: 12.8 sec;   INR: 1.09 ratio         PTT - ( 05 Apr 2022 03:35 )  PTT:28.6 sec    CAPILLARY BLOOD GLUCOSE  POCT Blood Glucose.: 184 mg/dL (05 Apr 2022 11:49)  POCT Blood Glucose.: 380 mg/dL (05 Apr 2022 08:34)  POCT Blood Glucose.: 295 mg/dL (05 Apr 2022 04:24)    RADIOLOGY & ADDITIONAL TESTS:  No studies performed     Assessment/Plan:  - pt with known afib with RVR cardio Dr. James is aware  - stat EKG showing afib @ 139bpm  - stat labs ordered previously - CMP, Mg, P - f/u  - pt received lopressor 2.5mg IV x2 earlier this afternoon - giving additional lopressor 5mg IV push now  - continue tele monitoring  - discussed with Dr. Ag. Discussed with Dr. Larson  - to follow up    RRT follow-up:  Pt seen and examined at the bedside. She denies any chest pain, palpitations, shortness of breath at this time. She has no complaints or concerns. D/w RN, no new changes at this time.    Vital signs:  HR 98  SpO2 100% on 3L NC  /96  RR 18    A/P  - Pt's repeat EKG reviewed, in NSR with sinus arrythmia  - K 3.9, Phos 4.9  - F/u AM labs  - RN to call with any changes  - Monitor rate and rhythm on tele

## 2022-04-05 NOTE — H&P ADULT - COMMENTS
Patient is  unable to provide any information/ROS  due to baseline mental status. Patient states " I don't know why" when she was asked why she missed dialysis

## 2022-04-05 NOTE — H&P ADULT - NSICDXPASTMEDICALHX_GEN_ALL_CORE_FT
PAST MEDICAL HISTORY:  Anemia secondary to renal failure     CAD (coronary artery disease)     CRF (chronic renal failure), unspecified stage     Depression     Diabetes mellitus II     Dialysis patient     h/o Anxiety attack     h/o Hepatitis A 1969 currently resolved, no symptoms    h/o Myocardial infarct 2007     h/o Smoking quitted 3/2012    HTN (hypertension)     HTN (hypertension)     Murmur, cardiac     PAD (peripheral artery disease)

## 2022-04-05 NOTE — CHART NOTE - NSCHARTNOTEFT_GEN_A_CORE
Called by RN for elevated HR of 150s. Pt seen and examined at bedside. Pt states she feels well. Pt denies shortness of breath, chest pain, palpitations, abdominal pain, headache, dizziness.       /81    T(C): 36.8 (04-05-22 @ 10:10), Max: 37.1 (04-05-22 @ 02:27)  RR: 19 (04-05-22 @ 10:10) (16 - 20)  SpO2: 93% (04-05-22 @ 10:10) (93% - 99%)  Wt(kg): --    Physical :  Gen- NAD, ncat  Cardio - s+1,s+2, rapid rate  Lung - cta b/l, no wheeze, no rhonchi, no rales   Abdomen- +BS, NT/ND, no guarding, no rebound, no masses  Ext- no edema, 2+ pulses b/l  Neuro- alert and oriented x4, moving all 4 extremities  LABS:                        10.6   21.36 )-----------( 377      ( 05 Apr 2022 03:35 )             31.4     04-05    x   |  x   |  x   ----------------------------<  x   7.5<HH>   |  x   |  x     Ca    9.0      05 Apr 2022 03:35    TPro  7.7  /  Alb  3.3  /  TBili  0.6  /  DBili  x   /  AST  14<L>  /  ALT  18  /  AlkPhos  96  04-05    PT/INR - ( 05 Apr 2022 03:35 )   PT: 12.8 sec;   INR: 1.09 ratio         PTT - ( 05 Apr 2022 03:35 )  PTT:28.6 sec            Assessment/Plan  71yFemale with PMHX of ESRD on HD, who presented with SOB after missing dialysis x 2. Found to have leukocytosis, likely multifactorial. She has right foot ulcers--there is no drainage or surrounding cellulitis noted but would evaluate further for osteomyelitis. Recent foot xray from 3/30/22 showed no evidence of osteomyelitis, and recent tissue cultures from 3/30/22 grew klebsiella oxytoca/raoutella oxytoca, E. coli, MSSA.. Pt now with tachycardia.    - will give lopressor 2.5mg STAT  - RN to call if any changes    Dr. Landeros  PGY2  [phone] Called by RN for elevated HR of 150s. Pt seen and examined at bedside. Pt states she feels well. Pt denies shortness of breath, chest pain, palpitations, abdominal pain, headache, dizziness.       /81    T(C): 36.8 (04-05-22 @ 10:10), Max: 37.1 (04-05-22 @ 02:27)  RR: 19 (04-05-22 @ 10:10) (16 - 20)  SpO2: 93% (04-05-22 @ 10:10) (93% - 99%)  Wt(kg): --    Physical :  Gen- NAD, ncat  Cardio - s+1,s+2, rapid rate  Lung - cta b/l, no wheeze, no rhonchi, no rales   Abdomen- +BS, NT/ND, no guarding, no rebound, no masses  Ext- no edema, 2+ pulses b/l  Neuro- alert and oriented x4, moving all 4 extremities  LABS:                        10.6   21.36 )-----------( 377      ( 05 Apr 2022 03:35 )             31.4     04-05    x   |  x   |  x   ----------------------------<  x   7.5<HH>   |  x   |  x     Ca    9.0      05 Apr 2022 03:35    TPro  7.7  /  Alb  3.3  /  TBili  0.6  /  DBili  x   /  AST  14<L>  /  ALT  18  /  AlkPhos  96  04-05    PT/INR - ( 05 Apr 2022 03:35 )   PT: 12.8 sec;   INR: 1.09 ratio         PTT - ( 05 Apr 2022 03:35 )  PTT:28.6 sec            Assessment/Plan  71yFemale with PMHX of ESRD on HD, who presented with SOB after missing dialysis x 2. Found to have leukocytosis, likely multifactorial. She has right foot ulcers--there is no drainage or surrounding cellulitis noted but would evaluate further for osteomyelitis. Recent foot xray from 3/30/22 showed no evidence of osteomyelitis, and recent tissue cultures from 3/30/22 grew klebsiella oxytoca/raoutella oxytoca, E. coli, MSSA.. Pt now with tachycardia.    - will give lopressor 2.5mg STAT - HR down to 130 from 150  - Second dose of Lopressor 2.5mg STAT - HR down to 119   - RN to call if any changes    Dr. Landeros  PGY2  [phone] Called by RN for elevated HR of 150s. Pt seen and examined at bedside. Pt states she feels well. Pt denies shortness of breath, chest pain, palpitations, abdominal pain, headache, dizziness.       /81    T(C): 36.8 (04-05-22 @ 10:10), Max: 37.1 (04-05-22 @ 02:27)  RR: 19 (04-05-22 @ 10:10) (16 - 20)  SpO2: 93% (04-05-22 @ 10:10) (93% - 99%)  Wt(kg): --    Physical :  Gen- NAD, ncat  Cardio - s+1,s+2, rapid rate  Lung - cta b/l, no wheeze, no rhonchi, no rales   Abdomen- +BS, NT/ND, no guarding, no rebound, no masses  Ext- no edema, 2+ pulses b/l  Neuro- alert and oriented x4, moving all 4 extremities  LABS:                        10.6   21.36 )-----------( 377      ( 05 Apr 2022 03:35 )             31.4     04-05    x   |  x   |  x   ----------------------------<  x   7.5<HH>   |  x   |  x     Ca    9.0      05 Apr 2022 03:35    TPro  7.7  /  Alb  3.3  /  TBili  0.6  /  DBili  x   /  AST  14<L>  /  ALT  18  /  AlkPhos  96  04-05    PT/INR - ( 05 Apr 2022 03:35 )   PT: 12.8 sec;   INR: 1.09 ratio         PTT - ( 05 Apr 2022 03:35 )  PTT:28.6 sec            Assessment/Plan  71yFemale with PMHX of ESRD on HD, who presented with SOB after missing dialysis x 2. Found to have leukocytosis, likely multifactorial. She has right foot ulcers--there is no drainage or surrounding cellulitis noted but would evaluate further for osteomyelitis. Recent foot xray from 3/30/22 showed no evidence of osteomyelitis, and recent tissue cultures from 3/30/22 grew klebsiella oxytoca/raoutella oxytoca, E. coli, MSSA.. Pt now with tachycardia.    - will give lopressor 2.5mg STAT - HR down to 130 from 150  - Second dose of Lopressor 2.5mg STAT - HR down to 119   - STAT CMP, magnesium and phos  - Dr. Larson aware and agrees with above plan  - RN to call if any changes    Dr. Landeros  PGY2  [phone] Called by RN for elevated HR of 150s. Patient with prior history of afib, had just finished dialysis treatment. Pt seen and examined at bedside. Pt states she feels well. Pt denies shortness of breath, chest pain, palpitations, abdominal pain, headache, dizziness, weakness, fevers/chills, nausea/vomitting.       /81    T(C): 36.8 (04-05-22 @ 10:10), Max: 37.1 (04-05-22 @ 02:27)  RR: 19 (04-05-22 @ 10:10) (16 - 20)  SpO2: 93% (04-05-22 @ 10:10) (93% - 99%)  Wt(kg): --    Physical :  Gen- NAD, ncat, confused  HEENT: PERRL b/l, patient unable to cooperate w/ EOM exam, symmetric smile   Cardio - s1, s2, rrr, no rubs, murmurs or gallops   Lung - cta b/l, no wheeze, no rhonchi, mild rales in b/l posterior inferior lung fields  Abdomen- +BS, NT/ND, no guarding, no rebound, no masses  Ext- no edema, no clubbing, no cyanosis, weak pedal pulses b/l, right toe cellulitis/lesion, left medial malleoli ulcer  Neuro- alert and oriented x1 (person), CN V, VII- XII in tact, CN II, III, IV, VI unable to properly assess, no sensory deficit, good muscle tone in b/l UE and b/l LE    LABS:                        10.6   21.36 )-----------( 377      ( 05 Apr 2022 03:35 )             31.4     04-05    x   |  x   |  x   ----------------------------<  x   7.5<HH>   |  x   |  x     Ca    9.0      05 Apr 2022 03:35    TPro  7.7  /  Alb  3.3  /  TBili  0.6  /  DBili  x   /  AST  14<L>  /  ALT  18  /  AlkPhos  96  04-05    PT/INR - ( 05 Apr 2022 03:35 )   PT: 12.8 sec;   INR: 1.09 ratio         PTT - ( 05 Apr 2022 03:35 )  PTT:28.6 sec            Assessment/Plan  71yFemale with PMHX of ESRD on HD, who presented with SOB after missing dialysis x 2. Found to have leukocytosis, likely multifactorial. She has right foot ulcers--there is no drainage or surrounding cellulitis noted but would evaluate further for osteomyelitis. Recent foot xray from 3/30/22 showed no evidence of osteomyelitis, and recent tissue cultures from 3/30/22 grew klebsiella oxytoca/raoutella oxytoca, E. coli, MSSA.. Pt now with tachycardia.    - will give lopressor 2.5mg STAT - HR down to 130 from 150  - Second dose of Lopressor 2.5mg STAT - HR down to 119   - STAT CMP, magnesium and phos  - Dr. Larson aware and agrees with above plan  - RN to call if any changes    Dr. Landeros  PGY2  [phone] Called by RN for elevated HR of 150s. Pt seen and examined at bedside. Pt states she feels well. Pt denies shortness of breath, chest pain, palpitations, abdominal pain, headache, dizziness, weakness, fevers/chills, nausea/vomitting.       /81    T(C): 36.8 (04-05-22 @ 10:10), Max: 37.1 (04-05-22 @ 02:27)  RR: 19 (04-05-22 @ 10:10) (16 - 20)  SpO2: 93% (04-05-22 @ 10:10) (93% - 99%)  Wt(kg): --    Physical :  Gen- NAD, ncat, confused  HEENT: PERRL b/l, patient unable to cooperate w/ EOM exam, symmetric smile   Cardio - s1, s2, rrr, no rubs, murmurs or gallops   Lung - cta b/l, no wheeze, no rhonchi, mild rales in b/l posterior inferior lung fields  Abdomen- +BS, NT/ND, no guarding, no rebound, no masses  Ext- no edema, no clubbing, no cyanosis, weak pedal pulses b/l, right toe cellulitis/lesion, left medial malleoli ulcer  Neuro- alert and oriented x1 (person), CN V, VII- XII in tact, CN II, III, IV, VI unable to properly assess, no sensory deficit, good muscle tone in b/l UE and b/l LE    LABS:                        10.6   21.36 )-----------( 377      ( 05 Apr 2022 03:35 )             31.4     04-05    x   |  x   |  x   ----------------------------<  x   7.5<HH>   |  x   |  x     Ca    9.0      05 Apr 2022 03:35    TPro  7.7  /  Alb  3.3  /  TBili  0.6  /  DBili  x   /  AST  14<L>  /  ALT  18  /  AlkPhos  96  04-05    PT/INR - ( 05 Apr 2022 03:35 )   PT: 12.8 sec;   INR: 1.09 ratio         PTT - ( 05 Apr 2022 03:35 )  PTT:28.6 sec            Assessment/Plan  71yFemale with PMHX of ESRD on HD, who presented with SOB after missing dialysis x 2. Found to have leukocytosis, likely multifactorial. She has right foot ulcers--there is no drainage or surrounding cellulitis noted but would evaluate further for osteomyelitis. Recent foot xray from 3/30/22 showed no evidence of osteomyelitis, and recent tissue cultures from 3/30/22 grew klebsiella oxytoca/raoutella oxytoca, E. coli, MSSA.. Pt now with tachycardia.    - will give lopressor 2.5mg STAT - HR down to 130 from 150  - Second dose of Lopressor 2.5mg STAT - HR down to 119   - STAT CMP, magnesium and phos  - Dr. Larson aware and agrees with above plan  - RN to call if any changes    Dr. Landeros  PGY2  [phone] Called by RN for elevated HR of 150s. Pt seen and examined at bedside. Pt states she feels well. Pt denies shortness of breath, chest pain, palpitations, abdominal pain, headache, dizziness, weakness, fevers/chills, nausea/vomitting.       /81    T(C): 36.8 (04-05-22 @ 10:10), Max: 37.1 (04-05-22 @ 02:27)  RR: 19 (04-05-22 @ 10:10) (16 - 20)  SpO2: 93% (04-05-22 @ 10:10) (93% - 99%)  Wt(kg): --    Physical :  Gen- NAD, ncat, confused  HEENT: PERRL b/l, symmetric smile   Cardio - s1, s2, rapid rate, irregular rhythm   Lung - cta b/l, no wheeze, no rhonchi, mild rales in b/l posterior inferior lung fields  Abdomen- +BS, NT/ND, no guarding, no rebound, no masses  Ext- no edema, no clubbing, no cyanosis, weak pedal pulses b/l, right toe lesion, left medial malleoli ulcer  Neuro- alert and oriented x1 (person), good muscle tone in b/l UE and b/l LE    LABS:                        10.6   21.36 )-----------( 377      ( 05 Apr 2022 03:35 )             31.4     04-05    x   |  x   |  x   ----------------------------<  x   7.5<HH>   |  x   |  x     Ca    9.0      05 Apr 2022 03:35    TPro  7.7  /  Alb  3.3  /  TBili  0.6  /  DBili  x   /  AST  14<L>  /  ALT  18  /  AlkPhos  96  04-05    PT/INR - ( 05 Apr 2022 03:35 )   PT: 12.8 sec;   INR: 1.09 ratio         PTT - ( 05 Apr 2022 03:35 )  PTT:28.6 sec            Assessment/Plan  71yFemale with PMHX of ESRD on HD, who presented with SOB after missing dialysis x 2. Found to have leukocytosis, likely multifactorial. She has right foot ulcers--there is no drainage or surrounding cellulitis noted but would evaluate further for osteomyelitis. Recent foot xray from 3/30/22 showed no evidence of osteomyelitis, and recent tissue cultures from 3/30/22 grew klebsiella oxytoca/raoutella oxytoca, E. coli, MSSA.. Pt now with tachycardia.    - will give lopressor 2.5mg STAT - HR down to 130 from 150  - Second dose of Lopressor 2.5mg STAT - HR down to 119   - STAT CMP, magnesium and phos  - Dr. Larson aware and agrees with above plan  - RN to call if any changes    Dr. Landeros  PGY2  [id number]

## 2022-04-05 NOTE — H&P ADULT - MUSCULOSKELETAL
ROM intact/no joint swelling/no joint erythema/no joint warmth/no calf tenderness not applicable detailed exam

## 2022-04-05 NOTE — CONSULT NOTE ADULT - SUBJECTIVE AND OBJECTIVE BOX
Patient is a 71y Female whom presented to the hospital with     PAST MEDICAL & SURGICAL HISTORY:  Diabetes mellitus II    HTN (hypertension)    h/o Anxiety attack    Depression    h/o Myocardial infarct 2007    CAD (coronary artery disease)    h/o Hepatitis A 1969  currently resolved, no symptoms    PAD (peripheral artery disease)    Murmur, cardiac    h/o Smoking  quitted 3/2012    CRF (chronic renal failure), unspecified stage    Dialysis patient    Anemia secondary to renal failure    HTN (hypertension)    coronary stent 2007    s/p Ovarian cyst removal    s/p surgical removal of benign Skin lesion epigastric area        MEDICATIONS  (STANDING):  dextrose 5%. 1000 milliLiter(s) (100 mL/Hr) IV Continuous <Continuous>  dextrose 5%. 1000 milliLiter(s) (50 mL/Hr) IV Continuous <Continuous>  dextrose 50% Injectable 25 Gram(s) IV Push once  dextrose 50% Injectable 12.5 Gram(s) IV Push once  dextrose 50% Injectable 25 Gram(s) IV Push once  glucagon  Injectable 1 milliGRAM(s) IntraMuscular once  insulin lispro (ADMELOG) corrective regimen sliding scale   SubCutaneous three times a day before meals  piperacillin/tazobactam IVPB.. 3.375 Gram(s) IV Intermittent every 12 hours      Allergies    latex (Unknown)  No Known Drug Allergies    Intolerances        SOCIAL HISTORY:  Denies ETOh,Smoking,     FAMILY HISTORY:      REVIEW OF SYSTEMS:    CONSTITUTIONAL: No weakness, fevers or chills  EYES/ENT: No visual changes;  no throat pain   NECK: No pain or stiffness  RESPIRATORY: No cough, wheezing, hemoptysis; No shortness of breath  CARDIOVASCULAR: No chest pain or palpitations  GASTROINTESTINAL: No abdominal or epigastric pain. No nausea, vomiting,     No diarrhea or constipation. No melena   GENITOURINARY: No dysuria, frequency or hematuria  NEUROLOGICAL: No numbness or weakness  SKIN: dry      VITAL:  T(C): , Max: 37.1 (04-05-22 @ 02:27)  T(F): , Max: 98.7 (04-05-22 @ 02:27)  HR: 91 (04-05-22 @ 06:26)  BP: 156/68 (04-05-22 @ 06:26)  BP(mean): --  RR: 20 (04-05-22 @ 06:26)  SpO2: 99% (04-05-22 @ 06:26)  Wt(kg): --    I and O's:    Height (cm): 175.3 (04-05 @ 02:27)  Weight (kg): 63.5 (04-05 @ 02:27)  BMI (kg/m2): 20.7 (04-05 @ 02:27)  BSA (m2): 1.78 (04-05 @ 02:27)    PHYSICAL EXAM:    Constitutional: NAD  HEENT: conjunctive   clear   Neck:  No JVD  Respiratory: CTAB  Cardiovascular: S1 and S2  Gastrointestinal: BS+, soft, NT/ND  Extremities: No peripheral edema  Neurological: A/O x 3, no focal deficits  Psychiatric: Normal mood, normal affect  : No Renteria  Skin: No rashes  Access: Not applicable    LABS:                        10.6   21.36 )-----------( 377      ( 05 Apr 2022 03:35 )             31.4     04-05    x   |  x   |  x   ----------------------------<  x   7.5<HH>   |  x   |  x     Ca    9.0      05 Apr 2022 03:35    TPro  7.7  /  Alb  3.3  /  TBili  0.6  /  DBili  x   /  AST  14<L>  /  ALT  18  /  AlkPhos  96  04-05      Urine Studies:          RADIOLOGY & ADDITIONAL STUDIES:                   Patient is a 71y Female whom presented to the hospital with esrd on hd     PAST MEDICAL & SURGICAL HISTORY:  Diabetes mellitus II    HTN (hypertension)    h/o Anxiety attack    Depression    h/o Myocardial infarct 2007    CAD (coronary artery disease)    h/o Hepatitis A 1969  currently resolved, no symptoms    PAD (peripheral artery disease)    Murmur, cardiac    h/o Smoking  quitted 3/2012    CRF (chronic renal failure), unspecified stage    Dialysis patient    Anemia secondary to renal failure    HTN (hypertension)    coronary stent 2007    s/p Ovarian cyst removal    s/p surgical removal of benign Skin lesion epigastric area        MEDICATIONS  (STANDING):  dextrose 5%. 1000 milliLiter(s) (100 mL/Hr) IV Continuous <Continuous>  dextrose 5%. 1000 milliLiter(s) (50 mL/Hr) IV Continuous <Continuous>  dextrose 50% Injectable 25 Gram(s) IV Push once  dextrose 50% Injectable 12.5 Gram(s) IV Push once  dextrose 50% Injectable 25 Gram(s) IV Push once  glucagon  Injectable 1 milliGRAM(s) IntraMuscular once  insulin lispro (ADMELOG) corrective regimen sliding scale   SubCutaneous three times a day before meals  piperacillin/tazobactam IVPB.. 3.375 Gram(s) IV Intermittent every 12 hours      Allergies    latex (Unknown)  No Known Drug Allergies    Intolerances        SOCIAL HISTORY:  Denies ETOh,Smoking,     FAMILY HISTORY:      REVIEW OF SYSTEMS:    CONSTITUTIONAL: No weakness, fevers or chills  RESPIRATORY: No cough, wheezing, hemoptysis; No shortness of breath  CARDIOVASCULAR: No chest pain or palpitations  GASTROINTESTINAL: No abdominal or epigastric pain. No nausea, vomiting,     No diarrhea or constipation. No melena   GENITOURINARY: No dysuria, frequency or hematuria    VITAL:  T(C): , Max: 37.1 (04-05-22 @ 02:27)  T(F): , Max: 98.7 (04-05-22 @ 02:27)  HR: 91 (04-05-22 @ 06:26)  BP: 156/68 (04-05-22 @ 06:26)  BP(mean): --  RR: 20 (04-05-22 @ 06:26)  SpO2: 99% (04-05-22 @ 06:26)  Wt(kg): --    I and O's:    Height (cm): 175.3 (04-05 @ 02:27)  Weight (kg): 63.5 (04-05 @ 02:27)  BMI (kg/m2): 20.7 (04-05 @ 02:27)  BSA (m2): 1.78 (04-05 @ 02:27)    PHYSICAL EXAM:    Constitutional: NAD  HEENT: conjunctive   clear   Neck:  No JVD  Respiratory: decrease bs b/l   Cardiovascular: S1 and S2  Gastrointestinal: BS+, soft, NT/ND  Extremities: No peripheral edema    LABS:                        10.6   21.36 )-----------( 377      ( 05 Apr 2022 03:35 )             31.4     04-05    x   |  x   |  x   ----------------------------<  x   7.5<HH>   |  x   |  x     Ca    9.0      05 Apr 2022 03:35    TPro  7.7  /  Alb  3.3  /  TBili  0.6  /  DBili  x   /  AST  14<L>  /  ALT  18  /  AlkPhos  96  04-05      Urine Studies:          RADIOLOGY & ADDITIONAL STUDIES:

## 2022-04-05 NOTE — H&P ADULT - PROBLEM SELECTOR PLAN 10
patient was explained importance of compliance ,social service input ( home situation and compliance issues ) May require placement at [facility]

## 2022-04-05 NOTE — CONSULT NOTE ADULT - ASSESSMENT
esrd- admitted with sob- missed dialysis x2  hyperkalemia  chf   ashd- s/p coronary stent  s/p cabg  hypertension  dyslipidemia  pvd- s/p bypass left leg  dialysis as per renal asap- discussed with pcp and renal  spoke to  4/5

## 2022-04-05 NOTE — H&P ADULT - NSHPLABSRESULTS_GEN_ALL_CORE
< from: Xray Chest 2 Views PA/Lat (03.30.22 @ 12:00) >    INTERPRETATION:  PA and lateral chest radiographs    COMPARISON: 12/11/2019 chest radiograph.    CLINICAL INFORMATION: Hyperbaric therapy clearance chest radiograph..    FINDINGS:    PULMONARY: The airway is midline.  There are no airspace consolidations.  No pleural effusion or pneumothorax.    HEART/VASCULAR: The heart size and mediastinum configuration are within   the limits of normal.    BONES: The visualized osseus structures are intact.    IMPRESSION:    No radiographic evidence of active chest disease..    < end of copied text >    < from: Xray Foot AP + Lateral + Oblique, Bilat (03.30.22 @ 12:00) >      INTERPRETATION:  RIGHT and LEFT foot    CLINICAL INFORMATION: Infection of RIGHT second toe and heel ulcerations.    . TECHNIQUE: RIGHT LEFT AP,lateral and oblique foot views. 6 views    FINDINGS:  LEFT foot:  Stable posterior heel ulceration without subcutaneous air  The bones and joint spaces are intact. No radiographic evidence of   osteomyelitis.  No fracture or dislocation.  Diffuse arterial vascular wall calcifications. .    RIGHT foot:  IMPRESSION:  No acute radiographic osseous pathology. No radiographic evidence of   ostial myelitis.    If osteomyelitis is considered  despite conservative therapy, and soft   tissue / bone infection requires further assessment, follow-up MRI   recommended.    < end of copied text >    Left Atrium: Moderate left atrial enlargement.  Left Ventricle: Endocardium poorly visualized. Grossly  moderate to severe segmental left ventricular systolic  dysfunction. The mid to basal inferoseptum and is akinetic.   Grossly normal left ventricular internal dimensions and  wall thicknesses.  Right Heart: Normal right atrium. Normal right ventricular  size and function. Normal tricuspid valve. Minimal  tricuspid regurgitation. Pulmonic valve not well  visualized.  Pericardium/Pleura: Normal pericardium with no pericardial  effusion.  Hemodynamic: Estimated right atrial pressure is 8 mm Hg.  ------------------------------------------------------------------------  Conclusions:  1. Mild to moderate mitral regurgitation (severity may be  underestimated).  Mild mitral stenosis (mean gradient = 2.5  mm Hg).  2. Moderate left atrial enlargement.  3. Grossly normal left ventricular internal dimensions and  wall thicknesses.  4. Endocardium poorly visualized. Grossly moderate to  severe segmental left ventricular systolic dysfunction. The  mid to basal inferoseptum and is akinetic.    < end of copied text >

## 2022-04-05 NOTE — CONSULT NOTE ADULT - ASSESSMENT
70yo female with hx of ESRD on HD, who presented with SOB after missing dialysis x 2. Found to have leukocytosis, likely multifactorial. She has right foot ulcers--there is no drainage or surrounding cellulitis noted but would evaluate further for osteomyelitis. Recent foot xray from 3/30/22 showed no evidence of osteomyelitis, and recent tissue cultures from 3/30/22 grew klebsiella oxytoca/raoutella oxytoca, E. coli, MSSA.    Would also evaluate for clostridioides difficile infection since patient reporting loose stools, possible recent antibiotic use. Can continue empiric antibiotics for now pending cultures and further evaluation.    1. Leukocytosis  -recommend ceftriaxone 1g IV q24h, discontinue Zosyn  -continue vancomycin by level pending cultures, hold off dose pending trough tomorrow  -follow blood cultures  -follow up CXR  -if with loose stools send stool for c diff  -monitor WBC    2. Right foot ulcers  -wound care  -suggest Right foot MRI    Thank you for courtesy of this consult.   Will follow.    Lupe Tsai MD  Division of Infectious Diseases   Cell [phone] between 8am and 6pm   After 6pm and weekends please call ID service at [phone].

## 2022-04-05 NOTE — CONSULT NOTE ADULT - SUBJECTIVE AND OBJECTIVE BOX
Date/Time Patient Seen:  		  Referring MD:   Data Reviewed	       Patient is a 71y old  Female who presents with a chief complaint of shortness of breath (05 Apr 2022 07:22)      Subjective/HPI  in bed  seen and examined  vs noted  labs reviewed  H and P reviewed  ER provider note reviewed    alert  verbal  on NRB        History of Present Illness:  Reason for Admission: shortness of breath  History of Present Illness:   70yo female bib ems with sob, pt states she has missed the last 2 rounds of dialysis and is due today, and has been having worsening sob, no cough, fever, chills, no chest pain no other complaints .Found to have hyperkalemia Admitted  to telemetry unit for monitoring , send 3 sets of cardiac enzymes to rule out acute coronary event, obtain ECHO to evaluate LVEF, cardiology consult  ,continue current management, O2 supply, anticoagulation plan as per cardiology consult Nephrology consult stat requested ,management d/w Dr Perez and  Palliative care consult requested ,to discuss advance directives and complete MOLST     PAST MEDICAL & SURGICAL HISTORY:  Diabetes mellitus II    HTN (hypertension)    h/o Anxiety attack    Depression    h/o Myocardial infarct 2007    CAD (coronary artery disease)    CAD (coronary artery disease)    h/o Hepatitis A 1969  currently resolved, no symptoms    PAD (peripheral artery disease)    Murmur, cardiac    h/o Smoking  quitted 3/2012    CRF (chronic renal failure), unspecified stage    Dialysis patient    Anemia secondary to renal failure    HTN (hypertension)    coronary stent 2007    s/p Ovarian cyst removal    s/p surgical removal of benign Skin lesion epigastric area  PAST SURGICAL HISTORY:  coronary stent 2007     s/p Ovarian cyst removal     s/p surgical removal of benign Skin lesion epigastric area.     Tobacco Screening:  · Core Measure Site	Yes  · Has the patient used tobacco in the past 30 days?	No    Risk Assessment:    Present on Admission:  Deep Venous Thrombosis	no  Pulmonary Embolus	no     Heart Failure:  Does this patient have a history of or has been diagnosed with heart failure? yes.     LV Function Assessment (LVS function was evaluated before arrival and/or during hospitalization) yes.     Is the Ejection Fraction >40% ? yes.     normal LV function.        Medication list         MEDICATIONS  (STANDING):  dextrose 5%. 1000 milliLiter(s) (100 mL/Hr) IV Continuous <Continuous>  dextrose 5%. 1000 milliLiter(s) (50 mL/Hr) IV Continuous <Continuous>  dextrose 50% Injectable 25 Gram(s) IV Push once  dextrose 50% Injectable 12.5 Gram(s) IV Push once  dextrose 50% Injectable 25 Gram(s) IV Push once  glucagon  Injectable 1 milliGRAM(s) IntraMuscular once  insulin lispro (ADMELOG) corrective regimen sliding scale   SubCutaneous three times a day before meals  piperacillin/tazobactam IVPB.. 3.375 Gram(s) IV Intermittent every 12 hours    MEDICATIONS  (PRN):  acetaminophen     Tablet .. 650 milliGRAM(s) Oral every 6 hours PRN Temp greater or equal to 38C (100.4F), Mild Pain (1 - 3)  aluminum hydroxide/magnesium hydroxide/simethicone Suspension 30 milliLiter(s) Oral every 4 hours PRN Dyspepsia  dextrose Oral Gel 15 Gram(s) Oral once PRN Blood Glucose LESS THAN 70 milliGRAM(s)/deciliter  melatonin 3 milliGRAM(s) Oral at bedtime PRN Insomnia  ondansetron Injectable 4 milliGRAM(s) IV Push every 8 hours PRN Nausea and/or Vomiting         Vitals log        ICU Vital Signs Last 24 Hrs  T(C): 37.1 (05 Apr 2022 02:27), Max: 37.1 (05 Apr 2022 02:27)  T(F): 98.7 (05 Apr 2022 02:27), Max: 98.7 (05 Apr 2022 02:27)  HR: 91 (05 Apr 2022 06:26) (91 - 97)  BP: 156/68 (05 Apr 2022 06:26) (132/68 - 156/68)  BP(mean): --  ABP: --  ABP(mean): --  RR: 20 (05 Apr 2022 06:26) (16 - 20)  SpO2: 99% (05 Apr 2022 06:26) (97% - 99%)           Input and Output:  I&O's Detail      Lab Data                        10.6   21.36 )-----------( 377      ( 05 Apr 2022 03:35 )             31.4     04-05    x   |  x   |  x   ----------------------------<  x   7.5<HH>   |  x   |  x     Ca    9.0      05 Apr 2022 03:35    TPro  7.7  /  Alb  3.3  /  TBili  0.6  /  DBili  x   /  AST  14<L>  /  ALT  18  /  AlkPhos  96  04-05            Review of Systems	  sob  aldana  weakness      Objective     Physical Examination    heart s1s2  lung dec BS  abd soft  head nc  extr chr changes  verbal      Pertinent Lab findings & Imaging      Renteria:  NO   Adequate UO     I&O's Detail           Discussed with:     Cultures:	        Radiology        ACC: [id number] EXAM:  XR FOOT COMP MIN 3 VIEWS BI                          PROCEDURE DATE:  03/30/2022          INTERPRETATION:  RIGHT and LEFT foot    CLINICAL INFORMATION: Infection of RIGHT second toe and heel ulcerations.    . TECHNIQUE: RIGHT LEFT AP,lateral and oblique foot views. 6 views    FINDINGS:  LEFT foot:  Stable posterior heel ulceration without subcutaneous air  The bones and joint spaces are intact. No radiographic evidence of   osteomyelitis.  No fracture or dislocation.  Diffuse arterial vascular wall calcifications. .    RIGHT foot:  IMPRESSION:  No acute radiographic osseous pathology. No radiographic evidence of   ostial myelitis.    If osteomyelitis is considered  despite conservative therapy, and soft   tissue / bone infection requires further assessment, follow-up MRI   recommended.    --- End of Report ---            ORLANDO COOMBS MD; Attending Radiologist  This document has been electronically signed. Mar 31 2022  9:40AM

## 2022-04-05 NOTE — ED ADULT NURSE NOTE - OBJECTIVE STATEMENT
Patient received complaining of shortness of breath from home. Patient states is a dialysis patient, (Tues, Thurs, Sat). and states she missed the last 2 dialysis because she "didn't feel like doing it anymore". Patient is alert and oriented, safety precautions in place, interventions implemented, awaiting evaluation.

## 2022-04-05 NOTE — H&P ADULT - HISTORY OF PRESENT ILLNESS
70yo female bib ems with sob, pt states she has missed the last 2 rounds of dialysis and is due today, and has been having worsening sob, no cough, fever, chills, no chest pain no other complaints .Found to have hyperkalemia Admitted  to telemetry unit for monitoring , send 3 sets of cardiac enzymes to rule out acute coronary event, obtain ECHO to evaluate LVEF, cardiology consult  ,continue current management, O2 supply, anticoagulation plan as per cardiology consult Nephrology consult stat requested ,management d/w Dr Perez and  Palliative care consult requested ,to discuss advance directives and complete MOLST

## 2022-04-05 NOTE — CONSULT NOTE ADULT - SUBJECTIVE AND OBJECTIVE BOX
CARDIOLOGY CONSULT NOTE    Patient is a 71y Female with a known history of :  Fluid overload [E87.70]    ESRD on dialysis [N18.6]    Poor compliance [Z91.19]    Hyperkalemia [E87.5]    DM (diabetes mellitus) [E11.9]    HTN (hypertension) [I10]    CAD (coronary artery disease) [I25.10]    Prophylactic measure [Z29.9]    Foot infection [L08.9]      HPI:  72yo female bib ems with sob, pt states she has missed the last 2 rounds of dialysis and is due today, and has been having worsening sob, no cough, fever, chills, no chest pain no other complaints .Found to have hyperkalemia Admitted  to telemetry unit for monitoring , send 3 sets of cardiac enzymes to rule out acute coronary event, obtain ECHO to evaluate LVEF, cardiology consult  ,continue current management, O2 supply, anticoagulation plan as per cardiology consult Nephrology consult stat requested ,management d/w Dr Perez and  Palliative care consult requested ,to discuss advance directives and complete MOLST  (05 Apr 2022 07:22)      REVIEW OF SYSTEMS:    CONSTITUTIONAL: No fever, weight loss, or fatigue  EYES: No eye pain, visual disturbances, or discharge  ENMT:  No difficulty hearing, tinnitus, vertigo; No sinus or throat pain  NECK: No pain or stiffness  BREASTS: No pain, masses, or nipple discharge  RESPIRATORY: No cough, wheezing, chills or hemoptysis; No shortness of breath  CARDIOVASCULAR: No chest pain, palpitations, dizziness, or leg swelling  GASTROINTESTINAL: No abdominal or epigastric pain. No nausea, vomiting, or hematemesis; No diarrhea or constipation. No melena or hematochezia.  GENITOURINARY: No dysuria, frequency, hematuria, or incontinence  NEUROLOGICAL: No headaches, memory loss, loss of strength, numbness, or tremors  SKIN: No itching, burning, rashes, or lesions   LYMPH NODES: No enlarged glands  ENDOCRINE: No heat or cold intolerance; No hair loss  MUSCULOSKELETAL: No joint pain or swelling; No muscle, back, or extremity pain  PSYCHIATRIC: No depression, anxiety, mood swings, or difficulty sleeping  HEME/LYMPH: No easy bruising, or bleeding gums  ALLERGY AND IMMUNOLOGIC: No hives or eczema    MEDICATIONS  (STANDING):  dextrose 5%. 1000 milliLiter(s) (100 mL/Hr) IV Continuous <Continuous>  dextrose 5%. 1000 milliLiter(s) (50 mL/Hr) IV Continuous <Continuous>  dextrose 50% Injectable 25 Gram(s) IV Push once  dextrose 50% Injectable 12.5 Gram(s) IV Push once  dextrose 50% Injectable 25 Gram(s) IV Push once  glucagon  Injectable 1 milliGRAM(s) IntraMuscular once  insulin lispro (ADMELOG) corrective regimen sliding scale   SubCutaneous three times a day before meals  piperacillin/tazobactam IVPB.. 3.375 Gram(s) IV Intermittent every 12 hours    MEDICATIONS  (PRN):  acetaminophen     Tablet .. 650 milliGRAM(s) Oral every 6 hours PRN Temp greater or equal to 38C (100.4F), Mild Pain (1 - 3)  aluminum hydroxide/magnesium hydroxide/simethicone Suspension 30 milliLiter(s) Oral every 4 hours PRN Dyspepsia  dextrose Oral Gel 15 Gram(s) Oral once PRN Blood Glucose LESS THAN 70 milliGRAM(s)/deciliter  melatonin 3 milliGRAM(s) Oral at bedtime PRN Insomnia  ondansetron Injectable 4 milliGRAM(s) IV Push every 8 hours PRN Nausea and/or Vomiting      ALLERGIES: latex (Unknown)  No Known Drug Allergies      Social History:     FAMILY HISTORY:      PAST MEDICAL & SURGICAL HISTORY:  Diabetes mellitus II    HTN (hypertension)    h/o Anxiety attack    Depression    h/o Myocardial infarct 2007    CAD (coronary artery disease)    h/o Hepatitis A 1969  currently resolved, no symptoms    PAD (peripheral artery disease)    Murmur, cardiac    h/o Smoking  quitted 3/2012    CRF (chronic renal failure), unspecified stage    Dialysis patient    Anemia secondary to renal failure    HTN (hypertension)    coronary stent 2007    s/p Ovarian cyst removal    s/p surgical removal of benign Skin lesion epigastric area          PHYSICAL EXAMINATION:  -----------------------------  T(C): 37.1 (04-05-22 @ 02:27), Max: 37.1 (04-05-22 @ 02:27)  HR: 91 (04-05-22 @ 06:26) (91 - 97)  BP: 156/68 (04-05-22 @ 06:26) (132/68 - 156/68)  RR: 20 (04-05-22 @ 06:26) (16 - 20)  SpO2: 99% (04-05-22 @ 06:26) (97% - 99%)  Wt(kg): --    Height (cm): 175.3 (04-05 @ 02:27)  Weight (kg): 63.5 (04-05 @ 02:27)  BMI (kg/m2): 20.7 (04-05 @ 02:27)  BSA (m2): 1.78 (04-05 @ 02:27)    Constitutional: well developed, normal appearance, well groomed, well nourished, no deformities and no acute distress.   Eyes: the conjunctiva exhibited no abnormalities and the eyelids demonstrated no xanthelasmas.   HEENT: normal oral mucosa, no oral pallor and no oral cyanosis.   Neck: normal jugular venous A waves present, normal jugular venous V waves present and no jugular venous wilson A waves.   Pulmonary: no respiratory distress, normal respiratory rhythm and effort, no accessory muscle use and lungs were clear to auscultation bilaterally.   Cardiovascular: heart rate and rhythm were normal, normal S1 and S2 and no murmur, gallop, rub, heave or thrill are present.   Abdomen: soft, non-tender, no hepato-splenomegaly and no abdominal mass palpated.   Musculoskeletal: the gait could not be assessed..   Extremities: no clubbing of the fingernails, no localized cyanosis, no petechial hemorrhages and no ischemic changes.   Skin: normal skin color and pigmentation, no rash, no venous stasis, no skin lesions, no skin ulcer and no xanthoma was observed.   Psychiatric: oriented to person, place, and time, the affect was normal, the mood was normal and not feeling anxious.     LABS:   --------  04-05    x   |  x   |  x   ----------------------------<  x   7.5<HH>   |  x   |  x     Ca    9.0      05 Apr 2022 03:35    TPro  7.7  /  Alb  3.3  /  TBili  0.6  /  DBili  x   /  AST  14<L>  /  ALT  18  /  AlkPhos  96  04-05                         10.6   21.36 )-----------( 377      ( 05 Apr 2022 03:35 )             31.4     PT/INR - ( 05 Apr 2022 03:35 )   PT: 12.8 sec;   INR: 1.09 ratio         PTT - ( 05 Apr 2022 03:35 )  PTT:28.6 sec  04-05 @ 03:35 BNP: 50864 pg/mL            RADIOLOGY:  -----------------        ECG:     ECHO

## 2022-04-05 NOTE — CONSULT NOTE ADULT - ASSESSMENT
70yo female bib ems with sob, pt states she has missed the last 2 rounds of dialysis and is due today, and has been having worsening sob    pt wishes to have HD  renal - dr alvarez  labs and imaging reviewed  on supplemental o2 support  monitor labs - lytes  HD as per Renal team  GOC documented - Spouse Geoffrey - HCP  pt is full code - wants an attempt at Resuscitation - pt does not want to linger on machines

## 2022-04-05 NOTE — CONSULT NOTE ADULT - SUBJECTIVE AND OBJECTIVE BOX
HPI:  72yo female bib ems with sob, pt states she has missed the last 2 rounds of dialysis and is due today, and has been having worsening sob, no cough, fever, chills, no chest pain no other complaints Found to have hyperkalemia Admitted  to telemetry unit for monitoring , send 3 sets of cardiac enzymes to rule out acute coronary event, obtain ECHO to evaluate LVEF, cardiology consult  ,continue current management, O2 supply, anticoagulation plan as per cardiology consult Nephrology consult stat requested ,management d/w Dr Perez and Dr. James Palliative care consult requested ,to discuss advance directives and complete. Denies any fever, chills, nausea, vomiting, or calf pain at this time.    Podiatry consulted for Right 2nd toe infection and heel ulcer to the left foot evaluation and management.     REVIEW OF SYSTEMS    PAST MEDICAL & SURGICAL HISTORY:  Diabetes mellitus II    HTN (hypertension)    h/o Anxiety attack    Depression    h/o Myocardial infarct 2007    CAD (coronary artery disease)    h/o Hepatitis A 1969  currently resolved, no symptoms    PAD (peripheral artery disease)    Murmur, cardiac    h/o Smoking  quitted 3/2012    CRF (chronic renal failure), unspecified stage    Dialysis patient    Anemia secondary to renal failure    HTN (hypertension)    coronary stent 2007    s/p Ovarian cyst removal    s/p surgical removal of benign Skin lesion epigastric area        Allergies    latex (Unknown)  No Known Drug Allergies    Intolerances        MEDICATIONS  (STANDING):  amLODIPine   Tablet 5 milliGRAM(s) Oral daily  aspirin enteric coated 81 milliGRAM(s) Oral daily  atorvastatin 40 milliGRAM(s) Oral at bedtime  calcium acetate 667 milliGRAM(s) Oral three times a day with meals  cloNIDine 0.1 milliGRAM(s) Oral at bedtime  clopidogrel Tablet 75 milliGRAM(s) Oral daily  dextrose 5%. 1000 milliLiter(s) (100 mL/Hr) IV Continuous <Continuous>  dextrose 5%. 1000 milliLiter(s) (50 mL/Hr) IV Continuous <Continuous>  dextrose 50% Injectable 25 Gram(s) IV Push once  dextrose 50% Injectable 12.5 Gram(s) IV Push once  dextrose 50% Injectable 25 Gram(s) IV Push once  epoetin fawad-epbx (RETACRIT) Injectable 81977 Unit(s) IV Push <User Schedule>  glucagon  Injectable 1 milliGRAM(s) IntraMuscular once  heparin   Injectable 5000 Unit(s) SubCutaneous every 12 hours  insulin lispro (ADMELOG) corrective regimen sliding scale   SubCutaneous three times a day before meals  insulin regular  human recombinant 10 Unit(s) SubCutaneous Once  metoprolol succinate  milliGRAM(s) Oral daily  multivitamin 1 Tablet(s) Oral daily  pantoprazole    Tablet 40 milliGRAM(s) Oral before breakfast  piperacillin/tazobactam IVPB.. 3.375 Gram(s) IV Intermittent every 12 hours    MEDICATIONS  (PRN):  acetaminophen     Tablet .. 650 milliGRAM(s) Oral every 6 hours PRN Temp greater or equal to 38C (100.4F), Mild Pain (1 - 3)  aluminum hydroxide/magnesium hydroxide/simethicone Suspension 30 milliLiter(s) Oral every 4 hours PRN Dyspepsia  dextrose Oral Gel 15 Gram(s) Oral once PRN Blood Glucose LESS THAN 70 milliGRAM(s)/deciliter  melatonin 3 milliGRAM(s) Oral at bedtime PRN Insomnia  ondansetron Injectable 4 milliGRAM(s) IV Push every 8 hours PRN Nausea and/or Vomiting      Social History:      FAMILY HISTORY:      Vital Signs Last 24 Hrs  T(C): 37.1 (05 Apr 2022 02:27), Max: 37.1 (05 Apr 2022 02:27)  T(F): 98.7 (05 Apr 2022 02:27), Max: 98.7 (05 Apr 2022 02:27)  HR: 91 (05 Apr 2022 06:26) (91 - 97)  BP: 156/68 (05 Apr 2022 06:26) (132/68 - 156/68)  BP(mean): --  RR: 20 (05 Apr 2022 06:26) (16 - 20)  SpO2: 99% (05 Apr 2022 06:26) (97% - 99%)    PHYSICAL EXAM:  Vascular: DP & PT palpable bilaterally, Capillary refill 3 seconds, Digital hair present bilaterally  Neurological: Light touch sensation intact bilaterally  Musculoskeletal: 5/5 strength in all quadrants bilaterally, AJ & STJ ROM intact  Dermatological: ---------- cm ulceration noted to the ----------, granular wound bed, no probe to bone, no periwound erythema, no fluctuance, no malodor, no proximal streaking at this time    CBC Full  -  ( 05 Apr 2022 03:35 )  WBC Count : 21.36 K/uL  RBC Count : 3.34 M/uL  Hemoglobin : 10.6 g/dL  Hematocrit : 31.4 %  Platelet Count - Automated : 377 K/uL  Mean Cell Volume : 94.0 fl  Mean Cell Hemoglobin : 31.7 pg  Mean Cell Hemoglobin Concentration : 33.8 gm/dL  Auto Neutrophil # : 18.75 K/uL  Auto Lymphocyte # : 0.51 K/uL  Auto Monocyte # : 1.80 K/uL  Auto Eosinophil # : 0.01 K/uL  Auto Basophil # : 0.09 K/uL  Auto Neutrophil % : 87.9 %  Auto Lymphocyte % : 2.4 %  Auto Monocyte % : 8.4 %  Auto Eosinophil % : 0.0 %  Auto Basophil % : 0.4 %      ----------CHEM PANEL----------    PT/INR - ( 05 Apr 2022 03:35 )   PT: 12.8 sec;   INR: 1.09 ratio         PTT - ( 05 Apr 2022 03:35 )  PTT:28.6 sec        Imaging: ----------   HPI:  72yo female bib ems with sob, pt states she has missed the last 2 rounds of dialysis and is due today, and has been having worsening sob, no cough, fever, chills, no chest pain no other complaints Found to have hyperkalemia Admitted  to telemetry unit for monitoring , send 3 sets of cardiac enzymes to rule out acute coronary event, obtain ECHO to evaluate LVEF, cardiology consult  ,continue current management, O2 supply, anticoagulation plan as per cardiology consult Nephrology consult stat requested ,management d/w Dr Perez and Dr. James Palliative care consult requested ,to discuss advance directives and complete. Denies any fever, chills, nausea, vomiting, or calf pain at this time.    Podiatry consulted for Right 2nd toe infection and heel ulcer to the left foot evaluation and management.     REVIEW OF SYSTEMS    PAST MEDICAL & SURGICAL HISTORY:  Diabetes mellitus II    HTN (hypertension)    h/o Anxiety attack    Depression    h/o Myocardial infarct 2007    CAD (coronary artery disease)    h/o Hepatitis A 1969  currently resolved, no symptoms    PAD (peripheral artery disease)    Murmur, cardiac    h/o Smoking  quitted 3/2012    CRF (chronic renal failure), unspecified stage    Dialysis patient    Anemia secondary to renal failure    HTN (hypertension)    coronary stent 2007    s/p Ovarian cyst removal    s/p surgical removal of benign Skin lesion epigastric area        Allergies    latex (Unknown)  No Known Drug Allergies    Intolerances        MEDICATIONS  (STANDING):  amLODIPine   Tablet 5 milliGRAM(s) Oral daily  aspirin enteric coated 81 milliGRAM(s) Oral daily  atorvastatin 40 milliGRAM(s) Oral at bedtime  calcium acetate 667 milliGRAM(s) Oral three times a day with meals  cloNIDine 0.1 milliGRAM(s) Oral at bedtime  clopidogrel Tablet 75 milliGRAM(s) Oral daily  dextrose 5%. 1000 milliLiter(s) (100 mL/Hr) IV Continuous <Continuous>  dextrose 5%. 1000 milliLiter(s) (50 mL/Hr) IV Continuous <Continuous>  dextrose 50% Injectable 25 Gram(s) IV Push once  dextrose 50% Injectable 12.5 Gram(s) IV Push once  dextrose 50% Injectable 25 Gram(s) IV Push once  epoetin fawad-epbx (RETACRIT) Injectable 52397 Unit(s) IV Push <User Schedule>  glucagon  Injectable 1 milliGRAM(s) IntraMuscular once  heparin   Injectable 5000 Unit(s) SubCutaneous every 12 hours  insulin lispro (ADMELOG) corrective regimen sliding scale   SubCutaneous three times a day before meals  insulin regular  human recombinant 10 Unit(s) SubCutaneous Once  metoprolol succinate  milliGRAM(s) Oral daily  multivitamin 1 Tablet(s) Oral daily  pantoprazole    Tablet 40 milliGRAM(s) Oral before breakfast  piperacillin/tazobactam IVPB.. 3.375 Gram(s) IV Intermittent every 12 hours    MEDICATIONS  (PRN):  acetaminophen     Tablet .. 650 milliGRAM(s) Oral every 6 hours PRN Temp greater or equal to 38C (100.4F), Mild Pain (1 - 3)  aluminum hydroxide/magnesium hydroxide/simethicone Suspension 30 milliLiter(s) Oral every 4 hours PRN Dyspepsia  dextrose Oral Gel 15 Gram(s) Oral once PRN Blood Glucose LESS THAN 70 milliGRAM(s)/deciliter  melatonin 3 milliGRAM(s) Oral at bedtime PRN Insomnia  ondansetron Injectable 4 milliGRAM(s) IV Push every 8 hours PRN Nausea and/or Vomiting      Social History:      FAMILY HISTORY:      Vital Signs Last 24 Hrs  T(C): 37.1 (05 Apr 2022 02:27), Max: 37.1 (05 Apr 2022 02:27)  T(F): 98.7 (05 Apr 2022 02:27), Max: 98.7 (05 Apr 2022 02:27)  HR: 91 (05 Apr 2022 06:26) (91 - 97)  BP: 156/68 (05 Apr 2022 06:26) (132/68 - 156/68)  BP(mean): --  RR: 20 (05 Apr 2022 06:26) (16 - 20)  SpO2: 99% (05 Apr 2022 06:26) (97% - 99%)    PHYSICAL EXAM:  Vascular: +1 pitting edema noted b/l. DP palpable bilaterally, Pt non- palpable bilaterally. Capillary refill 5 seconds, absent to the right 1st and second digits. Digital hair absent bilaterally  Neurological: Light touch sensation intact bilaterally  Musculoskeletal: 5/5 strength in all quadrants bilaterally, AJ & STJ ROM intact  Dermatological: Gagrenous changes noted to the right foot 1st and 2nd digit. 2 x 2 ulceration noted to the Left medial malleolus, stable scab noted wound bed, periwound erythema noted, mild fluctuance; about 2cc of yellow purulence expressed. No probe to bone, no periwound erythema, no malodor, no proximal streaking at this time    CBC Full  -  ( 05 Apr 2022 03:35 )  WBC Count : 21.36 K/uL  RBC Count : 3.34 M/uL  Hemoglobin : 10.6 g/dL  Hematocrit : 31.4 %  Platelet Count - Automated : 377 K/uL  Mean Cell Volume : 94.0 fl  Mean Cell Hemoglobin : 31.7 pg  Mean Cell Hemoglobin Concentration : 33.8 gm/dL  Auto Neutrophil # : 18.75 K/uL  Auto Lymphocyte # : 0.51 K/uL  Auto Monocyte # : 1.80 K/uL  Auto Eosinophil # : 0.01 K/uL  Auto Basophil # : 0.09 K/uL  Auto Neutrophil % : 87.9 %  Auto Lymphocyte % : 2.4 %  Auto Monocyte % : 8.4 %  Auto Eosinophil % : 0.0 %  Auto Basophil % : 0.4 %      ----------CHEM PANEL----------    PT/INR - ( 05 Apr 2022 03:35 )   PT: 12.8 sec;   INR: 1.09 ratio         PTT - ( 05 Apr 2022 03:35 )  PTT:28.6 sec        ACC: [id number] EXAM: XR FOOT COMP MIN 3 VIEWS BI    PROCEDURE DATE: 03/30/2022        INTERPRETATION: RIGHT and LEFT foot    CLINICAL INFORMATION: Infection of RIGHT second toe and heel ulcerations.    . TECHNIQUE: RIGHT LEFT AP,lateral and oblique foot views. 6 views    FINDINGS:  LEFT foot:  Stable posterior heel ulceration without subcutaneous air  The bones and joint spaces are intact. No radiographic evidence of osteomyelitis.  No fracture or dislocation.  Diffuse arterial vascular wall calcifications. .    RIGHT foot:  IMPRESSION:  No acute radiographic osseous pathology. No radiographic evidence of osteomyelitis.    If osteomyelitis is considered despite conservative therapy, and soft tissue / bone infection requires further assessment, follow-up MRI recommended.    --- End of Report ---

## 2022-04-05 NOTE — H&P ADULT - PSYCHIATRIC DETAILS
Impression: Hypermetropia, bilateral: H52.03. Plan: New glasses Rx given today. confused ,poor mentation/normal behavior

## 2022-04-05 NOTE — H&P ADULT - ASSESSMENT
72yo female bib ems with sob, pt states she has missed the last 2 rounds of dialysis and is due today, and has been having worsening sob, no cough, fever, chills, no chest pain no other complaints .Found to have hyperkalemia Admitted  to telemetry unit for monitoring , send 3 sets of cardiac enzymes to rule out acute coronary event, obtain ECHO to evaluate LVEF, cardiology consult  ,continue current management, O2 supply, anticoagulation plan as per cardiology consult Nephrology consult stat requested ,management d/w Dr Perez and  Palliative care consult requested ,to discuss advance directives and complete MOLST

## 2022-04-06 ENCOUNTER — APPOINTMENT (OUTPATIENT)
Dept: WOUND CARE | Facility: HOSPITAL | Age: 72
End: 2022-04-06

## 2022-04-06 DIAGNOSIS — I73.9 PERIPHERAL VASCULAR DISEASE, UNSPECIFIED: ICD-10-CM

## 2022-04-06 LAB
A1C WITH ESTIMATED AVERAGE GLUCOSE RESULT: 8.3 % — HIGH (ref 4–5.6)
ANION GAP SERPL CALC-SCNC: 15 MMOL/L — SIGNIFICANT CHANGE UP (ref 5–17)
BASOPHILS # BLD AUTO: 0.06 K/UL — SIGNIFICANT CHANGE UP (ref 0–0.2)
BASOPHILS NFR BLD AUTO: 0.3 % — SIGNIFICANT CHANGE UP (ref 0–2)
BUN SERPL-MCNC: 51 MG/DL — HIGH (ref 7–23)
CALCIUM SERPL-MCNC: 9.6 MG/DL — SIGNIFICANT CHANGE UP (ref 8.5–10.1)
CHLORIDE SERPL-SCNC: 96 MMOL/L — SIGNIFICANT CHANGE UP (ref 96–108)
CO2 SERPL-SCNC: 25 MMOL/L — SIGNIFICANT CHANGE UP (ref 22–31)
CREAT SERPL-MCNC: 7.2 MG/DL — HIGH (ref 0.5–1.3)
EGFR: 6 ML/MIN/1.73M2 — LOW
EOSINOPHIL # BLD AUTO: 0.01 K/UL — SIGNIFICANT CHANGE UP (ref 0–0.5)
EOSINOPHIL NFR BLD AUTO: 0.1 % — SIGNIFICANT CHANGE UP (ref 0–6)
ESTIMATED AVERAGE GLUCOSE: 192 MG/DL — HIGH (ref 68–114)
GLUCOSE SERPL-MCNC: 213 MG/DL — HIGH (ref 70–99)
HCT VFR BLD CALC: 30.7 % — LOW (ref 34.5–45)
HGB BLD-MCNC: 10.3 G/DL — LOW (ref 11.5–15.5)
IMM GRANULOCYTES NFR BLD AUTO: 0.7 % — SIGNIFICANT CHANGE UP (ref 0–1.5)
LYMPHOCYTES # BLD AUTO: 0.79 K/UL — LOW (ref 1–3.3)
LYMPHOCYTES # BLD AUTO: 4.5 % — LOW (ref 13–44)
MCHC RBC-ENTMCNC: 31.4 PG — SIGNIFICANT CHANGE UP (ref 27–34)
MCHC RBC-ENTMCNC: 33.6 GM/DL — SIGNIFICANT CHANGE UP (ref 32–36)
MCV RBC AUTO: 93.6 FL — SIGNIFICANT CHANGE UP (ref 80–100)
MONOCYTES # BLD AUTO: 1.85 K/UL — HIGH (ref 0–0.9)
MONOCYTES NFR BLD AUTO: 10.5 % — SIGNIFICANT CHANGE UP (ref 2–14)
NEUTROPHILS # BLD AUTO: 14.82 K/UL — HIGH (ref 1.8–7.4)
NEUTROPHILS NFR BLD AUTO: 83.9 % — HIGH (ref 43–77)
NRBC # BLD: 0 /100 WBCS — SIGNIFICANT CHANGE UP (ref 0–0)
PLATELET # BLD AUTO: 295 K/UL — SIGNIFICANT CHANGE UP (ref 150–400)
POTASSIUM SERPL-MCNC: 4.8 MMOL/L — SIGNIFICANT CHANGE UP (ref 3.5–5.3)
POTASSIUM SERPL-SCNC: 4.8 MMOL/L — SIGNIFICANT CHANGE UP (ref 3.5–5.3)
RBC # BLD: 3.28 M/UL — LOW (ref 3.8–5.2)
RBC # FLD: 18 % — HIGH (ref 10.3–14.5)
SODIUM SERPL-SCNC: 136 MMOL/L — SIGNIFICANT CHANGE UP (ref 135–145)
VANCOMYCIN TROUGH SERPL-MCNC: 8.7 UG/ML — LOW (ref 10–20)
WBC # BLD: 17.66 K/UL — HIGH (ref 3.8–10.5)
WBC # FLD AUTO: 17.66 K/UL — HIGH (ref 3.8–10.5)

## 2022-04-06 PROCEDURE — 71250 CT THORAX DX C-: CPT | Mod: 26

## 2022-04-06 PROCEDURE — 93970 EXTREMITY STUDY: CPT | Mod: 26

## 2022-04-06 PROCEDURE — 99232 SBSQ HOSP IP/OBS MODERATE 35: CPT

## 2022-04-06 PROCEDURE — 71045 X-RAY EXAM CHEST 1 VIEW: CPT | Mod: 26

## 2022-04-06 PROCEDURE — 99233 SBSQ HOSP IP/OBS HIGH 50: CPT

## 2022-04-06 PROCEDURE — 93925 LOWER EXTREMITY STUDY: CPT | Mod: 26

## 2022-04-06 RX ORDER — METOPROLOL TARTRATE 50 MG
25 TABLET ORAL ONCE
Refills: 0 | Status: COMPLETED | OUTPATIENT
Start: 2022-04-06 | End: 2022-04-06

## 2022-04-06 RX ORDER — VANCOMYCIN HCL 1 G
1000 VIAL (EA) INTRAVENOUS ONCE
Refills: 0 | Status: COMPLETED | OUTPATIENT
Start: 2022-04-06 | End: 2022-04-06

## 2022-04-06 RX ORDER — METOPROLOL TARTRATE 50 MG
100 TABLET ORAL EVERY 12 HOURS
Refills: 0 | Status: DISCONTINUED | OUTPATIENT
Start: 2022-04-07 | End: 2022-04-13

## 2022-04-06 RX ORDER — PIPERACILLIN AND TAZOBACTAM 4; .5 G/20ML; G/20ML
3.38 INJECTION, POWDER, LYOPHILIZED, FOR SOLUTION INTRAVENOUS EVERY 12 HOURS
Refills: 0 | Status: DISCONTINUED | OUTPATIENT
Start: 2022-04-07 | End: 2022-04-11

## 2022-04-06 RX ORDER — PIPERACILLIN AND TAZOBACTAM 4; .5 G/20ML; G/20ML
3.38 INJECTION, POWDER, LYOPHILIZED, FOR SOLUTION INTRAVENOUS ONCE
Refills: 0 | Status: COMPLETED | OUTPATIENT
Start: 2022-04-06 | End: 2022-04-06

## 2022-04-06 RX ADMIN — Medication 1: at 08:34

## 2022-04-06 RX ADMIN — Medication 81 MILLIGRAM(S): at 11:18

## 2022-04-06 RX ADMIN — Medication 1 TABLET(S): at 05:14

## 2022-04-06 RX ADMIN — Medication 0.1 MILLIGRAM(S): at 21:30

## 2022-04-06 RX ADMIN — AMLODIPINE BESYLATE 7.5 MILLIGRAM(S): 2.5 TABLET ORAL at 21:30

## 2022-04-06 RX ADMIN — HEPARIN SODIUM 5000 UNIT(S): 5000 INJECTION INTRAVENOUS; SUBCUTANEOUS at 17:17

## 2022-04-06 RX ADMIN — Medication 0.1 MILLIGRAM(S): at 11:18

## 2022-04-06 RX ADMIN — PANTOPRAZOLE SODIUM 40 MILLIGRAM(S): 20 TABLET, DELAYED RELEASE ORAL at 05:15

## 2022-04-06 RX ADMIN — Medication 50 MILLIGRAM(S): at 21:30

## 2022-04-06 RX ADMIN — PIPERACILLIN AND TAZOBACTAM 200 GRAM(S): 4; .5 INJECTION, POWDER, LYOPHILIZED, FOR SOLUTION INTRAVENOUS at 17:17

## 2022-04-06 RX ADMIN — ATORVASTATIN CALCIUM 40 MILLIGRAM(S): 80 TABLET, FILM COATED ORAL at 21:30

## 2022-04-06 RX ADMIN — Medication 3: at 21:30

## 2022-04-06 RX ADMIN — INSULIN GLARGINE 7 UNIT(S): 100 INJECTION, SOLUTION SUBCUTANEOUS at 08:35

## 2022-04-06 RX ADMIN — Medication 100 MILLIGRAM(S): at 05:14

## 2022-04-06 RX ADMIN — Medication 1 TABLET(S): at 11:19

## 2022-04-06 RX ADMIN — Medication 650 MILLIGRAM(S): at 16:20

## 2022-04-06 RX ADMIN — Medication 667 MILLIGRAM(S): at 08:34

## 2022-04-06 RX ADMIN — Medication 2: at 17:18

## 2022-04-06 RX ADMIN — Medication 250 MILLIGRAM(S): at 11:19

## 2022-04-06 RX ADMIN — HEPARIN SODIUM 5000 UNIT(S): 5000 INJECTION INTRAVENOUS; SUBCUTANEOUS at 05:15

## 2022-04-06 RX ADMIN — Medication 667 MILLIGRAM(S): at 11:19

## 2022-04-06 RX ADMIN — Medication 25 MILLIGRAM(S): at 16:26

## 2022-04-06 RX ADMIN — Medication 1 TABLET(S): at 17:17

## 2022-04-06 RX ADMIN — CLOPIDOGREL BISULFATE 75 MILLIGRAM(S): 75 TABLET, FILM COATED ORAL at 11:18

## 2022-04-06 RX ADMIN — Medication 667 MILLIGRAM(S): at 17:17

## 2022-04-06 RX ADMIN — Medication 650 MILLIGRAM(S): at 17:46

## 2022-04-06 NOTE — DIETITIAN INITIAL EVALUATION ADULT. - ENERGY INTAKE
Poor (<50%) Plate waste inspection from bfst tray reveals pt ate all yogurt, few bites cut up [place] toast.

## 2022-04-06 NOTE — PROGRESS NOTE ADULT - ASSESSMENT
72yo female with hx of ESRD on HD, who presented with SOB after missing dialysis x 2. Found to have leukocytosis, likely multifactorial in the setting of fluid overload and possible pneumonia--can broaden antibiotics given CT chest findings. She previously reported diarrhea and c diff ordered. She had a low grade fever overnight although WBC improved to 17 today. Blood cultures currently no growth.    She is also being evaluated for right foot osteomyelitis. No drainage or surrounding cellulitis noted from LE ulcers. Recent foot xray from 3/30/22 showed no evidence of osteomyelitis, and recent tissue cultures from 3/30/22 grew klebsiella oxytoca/raoutella oxytoca, E. coli, MSSA.    1. Leukocytosis, possible pneumonia  -recommend Zosyn  -hold off on vancomycin, ceftriaxone  -follow blood cultures to completion  -recommend MRSA nasal PCR, urine legionella and pneumococcal antigens  -if with loose stools send stool for c diff--if no diarrhea by tomorrow can discontinue contact isolation    2. Right foot ulcers  -wound care  -follow up Right foot MRI, L ankle MRI if patient can tolerate    Thank you for courtesy of this consult.   Will follow.    Lupe Tsai MD  Division of Infectious Diseases   Cell [phone] between 8am and 6pm   After 6pm and weekends please call ID service at [phone].    72yo female with hx of ESRD on HD, who presented with SOB after missing dialysis x 2. Found to have leukocytosis, likely multifactorial in the setting of fluid overload and possible pneumonia--can broaden antibiotics given CT chest findings. She previously reported diarrhea and c diff ordered. She had a low grade fever overnight although WBC improved to 17 today. Blood cultures currently no growth.    She is also being evaluated for right foot osteomyelitis. No drainage or surrounding cellulitis noted from LE ulcers. Recent foot xray from 3/30/22 showed no evidence of osteomyelitis, and recent tissue cultures from 3/30/22 grew klebsiella oxytoca/raoutella oxytoca, E. coli, MSSA.    1. Leukocytosis, possible pneumonia  -recommend Zosyn  -hold off on vancomycin, ceftriaxone  -follow blood cultures to completion  -recommend MRSA nasal PCR, urine legionella and pneumococcal antigens  -if with loose stools send stool for c diff--if no diarrhea by tomorrow can discontinue contact isolation    2. Right foot ulcers  -wound care  -follow up Right foot MRI, L ankle MRI if patient can tolerate    Lupe Tsai MD  Division of Infectious Diseases   Cell [phone] between 8am and 6pm   After 6pm and weekends please call ID service at [phone].

## 2022-04-06 NOTE — PROGRESS NOTE ADULT - NUTRITIONAL ASSESSMENT
This patient has been assessed with a concern for Malnutrition and has been determined to have a diagnosis/diagnoses of Severe protein-calorie malnutrition.    This patient is being managed with:   Diet Consistent Carbohydrate Renal w/Evening Snack-  Easy to Chew (EASYTOCHEW)  Supplement Feeding Modality:  Oral  Nepro Cans or Servings Per Day:  1       Frequency:  Daily  Entered: Apr 6 2022 12:14PM    Diet Consistent Carbohydrate Renal w/Evening Snack-  Easy to Chew (EASYTOCHEW)  For patients receiving Renal Replacement - No Protein Restr No Conc K No Conc Phos Low Sodium (RENAL)  No Concentrated Potassium  No Concentrated Phosphorus  Low Sodium  Entered: Apr 5 2022  7:08AM    The following pending diet order is being considered for treatment of Severe protein-calorie malnutrition:null

## 2022-04-06 NOTE — DIETITIAN NUTRITION RISK NOTIFICATION - TREATMENT: THE FOLLOWING DIET HAS BEEN RECOMMENDED
Diet, Consistent Carbohydrate Renal w/Evening Snack:   Easy to Chew (EASYTOCHEW)  For patients receiving Renal Replacement - No Protein Restr, No Conc K, No Conc Phos, Low Sodium (RENAL)  No Concentrated Potassium  No Concentrated Phosphorus  Low Sodium (04-05-22 @ 07:11) [Active]

## 2022-04-06 NOTE — PROGRESS NOTE ADULT - PROBLEM SELECTOR PLAN 1
Pt seen and evaluated  - Labs, x-rays reviewed  - No clinical signs of infection noted to the 1st & 2nd digit, gangrenous changes noted. No purulence expressed from Left ankle wound. No heel wound noted at this time.  - WBC 17.66 trending down from 21.36, please look for other sources of infection. Elevated WBC is likely not from the foot and ankle wounds  - All wounds dressed with DSD  - Continue IV abx per ID, zosyn  - Pending MRI to rule out OM to right foot and Left ankle wounds  - Recommend vascular consult for PAD

## 2022-04-06 NOTE — PATIENT PROFILE ADULT - FALL HARM RISK - HARM RISK INTERVENTIONS

## 2022-04-06 NOTE — PROGRESS NOTE ADULT - SUBJECTIVE AND OBJECTIVE BOX
PROGRESS NOTE  Patient is a 71y old  Female who presents with a chief complaint of shortness of breath (06 Apr 2022 11:29)      OVERNIGHT      HPI:  70yo female bib ems with sob, pt states she has missed the last 2 rounds of dialysis and is due today, and has been having worsening sob, no cough, fever, chills, no chest pain no other complaints .Found to have hyperkalemia Admitted  to telemetry unit for monitoring , send 3 sets of cardiac enzymes to rule out acute coronary event, obtain ECHO to evaluate LVEF, cardiology consult  ,continue current management, O2 supply, anticoagulation plan as per cardiology consult Nephrology consult stat requested ,management d/w Dr Perez and  Palliative care consult requested ,to discuss advance directives and complete MOLST  (05 Apr 2022 07:22)    PAST MEDICAL & SURGICAL HISTORY:  Diabetes mellitus II    HTN (hypertension)    h/o Anxiety attack    Depression    h/o Myocardial infarct 2007    CAD (coronary artery disease)    h/o Hepatitis A 1969  currently resolved, no symptoms    PAD (peripheral artery disease)    Murmur, cardiac    h/o Smoking  quitted 3/2012    CRF (chronic renal failure), unspecified stage    Dialysis patient    Anemia secondary to renal failure    HTN (hypertension)    coronary stent 2007    s/p Ovarian cyst removal    s/p surgical removal of benign Skin lesion epigastric area        MEDICATIONS  (STANDING):  amLODIPine   Tablet 7.5 milliGRAM(s) Oral every 24 hours  aspirin enteric coated 81 milliGRAM(s) Oral daily  atorvastatin 40 milliGRAM(s) Oral at bedtime  calcium acetate 667 milliGRAM(s) Oral three times a day with meals  cloNIDine 0.1 milliGRAM(s) Oral every 12 hours  clopidogrel Tablet 75 milliGRAM(s) Oral daily  dextrose 5%. 1000 milliLiter(s) (100 mL/Hr) IV Continuous <Continuous>  dextrose 5%. 1000 milliLiter(s) (50 mL/Hr) IV Continuous <Continuous>  dextrose 50% Injectable 25 Gram(s) IV Push once  dextrose 50% Injectable 12.5 Gram(s) IV Push once  dextrose 50% Injectable 25 Gram(s) IV Push once  epoetin fawad-epbx (RETACRIT) Injectable 07091 Unit(s) IV Push <User Schedule>  glucagon  Injectable 1 milliGRAM(s) IntraMuscular once  heparin   Injectable 5000 Unit(s) SubCutaneous every 12 hours  imipramine 50 milliGRAM(s) Oral at bedtime  insulin glargine Injectable (LANTUS) 7 Unit(s) SubCutaneous every morning  insulin lispro (ADMELOG) corrective regimen sliding scale   SubCutaneous at bedtime  insulin lispro (ADMELOG) corrective regimen sliding scale   SubCutaneous three times a day before meals  lactobacillus acidophilus 1 Tablet(s) Oral two times a day  metoprolol succinate  milliGRAM(s) Oral daily  Nephro-elvie 1 Tablet(s) Oral daily  pantoprazole    Tablet 40 milliGRAM(s) Oral before breakfast  piperacillin/tazobactam IVPB. 3.375 Gram(s) IV Intermittent once  piperacillin/tazobactam IVPB.. 3.375 Gram(s) IV Intermittent every 12 hours    MEDICATIONS  (PRN):  acetaminophen     Tablet .. 650 milliGRAM(s) Oral every 6 hours PRN Temp greater or equal to 38C (100.4F), Mild Pain (1 - 3)  aluminum hydroxide/magnesium hydroxide/simethicone Suspension 30 milliLiter(s) Oral every 4 hours PRN Dyspepsia  dextrose Oral Gel 15 Gram(s) Oral once PRN Blood Glucose LESS THAN 70 milliGRAM(s)/deciliter  melatonin 3 milliGRAM(s) Oral at bedtime PRN Insomnia  ondansetron Injectable 4 milliGRAM(s) IV Push every 8 hours PRN Nausea and/or Vomiting  traMADol 50 milliGRAM(s) Oral three times a day PRN Moderate Pain (4 - 6)      OBJECTIVE    T(C): 36.7 (04-06-22 @ 05:13), Max: 38.2 (04-05-22 @ 22:10)  HR: 98 (04-06-22 @ 05:13) (97 - 124)  BP: 154/69 (04-06-22 @ 05:13) (132/71 - 184/65)  RR: 19 (04-06-22 @ 05:13) (18 - 20)  SpO2: 95% (04-06-22 @ 05:13) (95% - 99%)  Wt(kg): --  I&O's Summary    05 Apr 2022 07:01  -  06 Apr 2022 07:00  --------------------------------------------------------  IN: 0 mL / OUT: 1900 mL / NET: -1900 mL          REVIEW OF SYSTEMS:  CONSTITUTIONAL: No fever, weight loss, or fatigue  EYES: No eye pain, visual disturbances, or discharge  ENMT:   No sinus or throat pain  NECK: No pain or stiffness  RESPIRATORY: No cough, wheezing, chills or hemoptysis; No shortness of breath  CARDIOVASCULAR: No chest pain, palpitations, dizziness, or leg swelling  GASTROINTESTINAL: No abdominal pain. No nausea, vomiting; No diarrhea or constipation. No melena or hematochezia.  GENITOURINARY: No dysuria, frequency, hematuria, or incontinence  NEUROLOGICAL: No headaches, memory loss, loss of strength, numbness, or tremors  SKIN: No itching, burning, rashes, or lesions   MUSCULOSKELETAL: No joint pain or swelling; No muscle, back, or extremity pain    PHYSICAL EXAM:  Appearance: NAD. VS past 24 hrs -as above   HEENT:   Moist oral mucosa. Conjunctiva clear b/l.   Neck : supple  Respiratory: Lungs CTAB.  Gastrointestinal:  Soft, nontender. No rebound. No rigidity. BS present	  Cardiovascular: RRR ,S1S2 present  Neurologic: Non-focal. Moving all extremities.  Extremities: No edema. No erythema. No calf tenderness.  Skin: No rashes, No ecchymoses, No cyanosis.	  wounds ,skin lesions-See skin assesment flow sheet   LABS:                        10.3   17.66 )-----------( 295      ( 06 Apr 2022 05:22 )             30.7     04-06    136  |  96  |  51<H>  ----------------------------<  213<H>  4.8   |  25  |  7.20<H>    Ca    9.6      06 Apr 2022 05:22  Phos  4.9     04-05  Mg     2.3     04-05    TPro  7.4  /  Alb  3.0<L>  /  TBili  0.7  /  DBili  x   /  AST  19  /  ALT  17  /  AlkPhos  90  04-05    CAPILLARY BLOOD GLUCOSE      POCT Blood Glucose.: 170 mg/dL (06 Apr 2022 08:29)  POCT Blood Glucose.: 204 mg/dL (05 Apr 2022 21:03)  POCT Blood Glucose.: 148 mg/dL (05 Apr 2022 17:16)  POCT Blood Glucose.: 167 mg/dL (05 Apr 2022 14:57)    PT/INR - ( 05 Apr 2022 03:35 )   PT: 12.8 sec;   INR: 1.09 ratio         PTT - ( 05 Apr 2022 03:35 )  PTT:28.6 sec      Culture - Blood (collected 05 Apr 2022 09:28)  Source: .Blood Blood-Venous  Preliminary Report (06 Apr 2022 10:01):    No growth to date.    Culture - Blood (collected 05 Apr 2022 09:28)  Source: .Blood Blood-Venous  Preliminary Report (06 Apr 2022 10:01):    No growth to date.    Culture - Tissue with Gram Stain (collected 30 Mar 2022 18:08)  Source: .Tissue Other, Left Medial Ankle  Gram Stain (30 Mar 2022 20:57):    Few polymorphonuclear leukocytes per low power field    Moderate Gram positive cocci in pairs per oil power field    Rare Gram Negative Rods per oil power field  Preliminary Report (31 Mar 2022 18:16):    Few Klebsiella oxytoca/Raoutella ornithinolytica    Few Escherichia coli    Moderate Staphylococcus aureus  Organism: Klebsiella oxytoca /Raoutella ornithinolytica  Escherichia coli  Staphylococcus aureus (01 Apr 2022 16:04)  Organism: Staphylococcus aureus (01 Apr 2022 16:04)  Organism: Escherichia coli (01 Apr 2022 16:04)  Organism: Klebsiella oxytoca /Raoutella ornithinolytica (01 Apr 2022 16:04)      RADIOLOGY & ADDITIONAL TESTS:   reviewed elctronically  ASSESSMENT/PLAN: 	     PROGRESS NOTE  Patient is a 71y old  Female who presents with a chief complaint of shortness of breath (06 Apr 2022 11:29)  Chart and available morning labs /imaging are reviewed electronically , urgent issues addressed . More information  is being added upon completion of rounds , when more information is collected and management discussed with consultants , medical staff and social service/case management on the floor   OVERNIGHT  No new issues reported by medical staff . All above noted ,rx adjusted by cardilogist ,scheduled for HD today  Patient is resting in a bed comfortably  .No distress noted MRI R FOOT AND L ANKLE is pending ,d/w MR department   HPI:  72yo female bib ems with sob, pt states she has missed the last 2 rounds of dialysis and is due today, and has been having worsening sob, no cough, fever, chills, no chest pain no other complaints .Found to have hyperkalemia Admitted  to telemetry unit for monitoring , send 3 sets of cardiac enzymes to rule out acute coronary event, obtain ECHO to evaluate LVEF, cardiology consult  ,continue current management, O2 supply, anticoagulation plan as per cardiology consult Nephrology consult stat requested ,management d/w Dr Perez and  Palliative care consult requested ,to discuss advance directives and complete MOLST  (05 Apr 2022 07:22)    PAST MEDICAL & SURGICAL HISTORY:  Diabetes mellitus II    HTN (hypertension)    h/o Anxiety attack    Depression    h/o Myocardial infarct 2007    CAD (coronary artery disease)    h/o Hepatitis A 1969  currently resolved, no symptoms    PAD (peripheral artery disease)    Murmur, cardiac    h/o Smoking  quitted 3/2012    CRF (chronic renal failure), unspecified stage    Dialysis patient    Anemia secondary to renal failure    HTN (hypertension)    coronary stent 2007    s/p Ovarian cyst removal    s/p surgical removal of benign Skin lesion epigastric area        MEDICATIONS  (STANDING):  amLODIPine   Tablet 7.5 milliGRAM(s) Oral every 24 hours  aspirin enteric coated 81 milliGRAM(s) Oral daily  atorvastatin 40 milliGRAM(s) Oral at bedtime  calcium acetate 667 milliGRAM(s) Oral three times a day with meals  cloNIDine 0.1 milliGRAM(s) Oral every 12 hours  clopidogrel Tablet 75 milliGRAM(s) Oral daily  dextrose 5%. 1000 milliLiter(s) (100 mL/Hr) IV Continuous <Continuous>  dextrose 5%. 1000 milliLiter(s) (50 mL/Hr) IV Continuous <Continuous>  dextrose 50% Injectable 25 Gram(s) IV Push once  dextrose 50% Injectable 12.5 Gram(s) IV Push once  dextrose 50% Injectable 25 Gram(s) IV Push once  epoetin fawad-epbx (RETACRIT) Injectable 33180 Unit(s) IV Push <User Schedule>  glucagon  Injectable 1 milliGRAM(s) IntraMuscular once  heparin   Injectable 5000 Unit(s) SubCutaneous every 12 hours  imipramine 50 milliGRAM(s) Oral at bedtime  insulin glargine Injectable (LANTUS) 7 Unit(s) SubCutaneous every morning  insulin lispro (ADMELOG) corrective regimen sliding scale   SubCutaneous at bedtime  insulin lispro (ADMELOG) corrective regimen sliding scale   SubCutaneous three times a day before meals  lactobacillus acidophilus 1 Tablet(s) Oral two times a day  metoprolol succinate  milliGRAM(s) Oral daily  Nephro-elvie 1 Tablet(s) Oral daily  pantoprazole    Tablet 40 milliGRAM(s) Oral before breakfast  piperacillin/tazobactam IVPB. 3.375 Gram(s) IV Intermittent once  piperacillin/tazobactam IVPB.. 3.375 Gram(s) IV Intermittent every 12 hours    MEDICATIONS  (PRN):  acetaminophen     Tablet .. 650 milliGRAM(s) Oral every 6 hours PRN Temp greater or equal to 38C (100.4F), Mild Pain (1 - 3)  aluminum hydroxide/magnesium hydroxide/simethicone Suspension 30 milliLiter(s) Oral every 4 hours PRN Dyspepsia  dextrose Oral Gel 15 Gram(s) Oral once PRN Blood Glucose LESS THAN 70 milliGRAM(s)/deciliter  melatonin 3 milliGRAM(s) Oral at bedtime PRN Insomnia  ondansetron Injectable 4 milliGRAM(s) IV Push every 8 hours PRN Nausea and/or Vomiting  traMADol 50 milliGRAM(s) Oral three times a day PRN Moderate Pain (4 - 6)      OBJECTIVE    T(C): 36.7 (04-06-22 @ 05:13), Max: 38.2 (04-05-22 @ 22:10)  HR: 98 (04-06-22 @ 05:13) (97 - 124)  BP: 154/69 (04-06-22 @ 05:13) (132/71 - 184/65)  RR: 19 (04-06-22 @ 05:13) (18 - 20)  SpO2: 95% (04-06-22 @ 05:13) (95% - 99%)  Wt(kg): --  I&O's Summary    05 Apr 2022 07:01  -  06 Apr 2022 07:00  --------------------------------------------------------  IN: 0 mL / OUT: 1900 mL / NET: -1900 mL          REVIEW OF SYSTEMS:  CONSTITUTIONAL: No fever, weight loss, or fatigue  EYES: No eye pain, visual disturbances, or discharge  ENMT:   No sinus or throat pain  NECK: No pain or stiffness  RESPIRATORY: No cough, wheezing, chills or hemoptysis; No shortness of breath  CARDIOVASCULAR: No chest pain, palpitations, dizziness, or leg swelling  GASTROINTESTINAL: No abdominal pain. No nausea, vomiting; No diarrhea or constipation. No melena or hematochezia.  GENITOURINARY: No dysuria, frequency, hematuria, or incontinence  NEUROLOGICAL: No headaches, memory loss, loss of strength, numbness, or tremors  SKIN: No itching, burning, rashes, or lesions   MUSCULOSKELETAL: No joint pain or swelling; No muscle, back, or extremity pain    PHYSICAL EXAM:  Appearance: NAD. VS past 24 hrs -as above   HEENT:   Moist oral mucosa. Conjunctiva clear b/l.   Neck : supple  Respiratory: Lungs CTAB.  Gastrointestinal:  Soft, nontender. No rebound. No rigidity. BS present	  Cardiovascular: RRR ,S1S2 present  Neurologic: Non-focal. Moving all extremities.  Extremities: No edema. No erythema. No calf tenderness.  r foot and l ankle ulcers   Skin: No rashes, No ecchymoses, No cyanosis.	  wounds ,skin lesions-See skin assesment flow sheet   LABS:                        10.3   17.66 )-----------( 295      ( 06 Apr 2022 05:22 )             30.7     04-06    136  |  96  |  51<H>  ----------------------------<  213<H>  4.8   |  25  |  7.20<H>    Ca    9.6      06 Apr 2022 05:22  Phos  4.9     04-05  Mg     2.3     04-05    TPro  7.4  /  Alb  3.0<L>  /  TBili  0.7  /  DBili  x   /  AST  19  /  ALT  17  /  AlkPhos  90  04-05    CAPILLARY BLOOD GLUCOSE      POCT Blood Glucose.: 170 mg/dL (06 Apr 2022 08:29)  POCT Blood Glucose.: 204 mg/dL (05 Apr 2022 21:03)  POCT Blood Glucose.: 148 mg/dL (05 Apr 2022 17:16)  POCT Blood Glucose.: 167 mg/dL (05 Apr 2022 14:57)    PT/INR - ( 05 Apr 2022 03:35 )   PT: 12.8 sec;   INR: 1.09 ratio         PTT - ( 05 Apr 2022 03:35 )  PTT:28.6 sec      Culture - Blood (collected 05 Apr 2022 09:28)  Source: .Blood Blood-Venous  Preliminary Report (06 Apr 2022 10:01):    No growth to date.    Culture - Blood (collected 05 Apr 2022 09:28)  Source: .Blood Blood-Venous  Preliminary Report (06 Apr 2022 10:01):    No growth to date.    Culture - Tissue with Gram Stain (collected 30 Mar 2022 18:08)  Source: .Tissue Other, Left Medial Ankle  Gram Stain (30 Mar 2022 20:57):    Few polymorphonuclear leukocytes per low power field    Moderate Gram positive cocci in pairs per oil power field    Rare Gram Negative Rods per oil power field  Preliminary Report (31 Mar 2022 18:16):    Few Klebsiella oxytoca/Raoutella ornithinolytica    Few Escherichia coli    Moderate Staphylococcus aureus  Organism: Klebsiella oxytoca /Raoutella ornithinolytica  Escherichia coli  Staphylococcus aureus (01 Apr 2022 16:04)  Organism: Staphylococcus aureus (01 Apr 2022 16:04)  Organism: Escherichia coli (01 Apr 2022 16:04)  Organism: Klebsiella oxytoca /Raoutella ornithinolytica (01 Apr 2022 16:04)      RADIOLOGY & ADDITIONAL TESTS:< from: US Duplex Arterial Lower Ext Compl, Bilateral (04.06.22 @ 14:05) >  ACC: [id number] EXAM:  US DPLX LWR EXT ARTS COMPL BI                          PROCEDURE DATE:  04/06/2022          INTERPRETATION:  CLINICAL INDICATION: Lower extremity wounds. Evaluate   for lower extremity arterial pathology.    EXAMINATION: Bilateral lower extremity arterial duplex ultrasound    COMPARISON: None    FINDINGS:    Limited evaluation as noted by technologist due to patient motion.    Bilateral lower extremity atheromatous disease.    Progressively delayed upstroke of right common femoral, superficial   femoral, and popliteal arteries noted. The possibility of inflow   significant stenosis cannot be excluded. Trifurcation arteries are not   visualized due to patient motion. Right dorsalis pedis artery could not   be assessed due to overlying bandage material.    Incompletely imaged bypass graft of left left lower extremity, which   demonstrates antegrade flow. The full extent of the bypass graft is not   adequately assessed on this study. The left superficial femoral artery  demonstrates minimal flow. Trifurcation arteries are not visualized due   to patient motion.    IMPRESSION:    Markedly limited study.    Concern for right lower iliac artery extremity inflow disease.    Incompletely visualized bypass graft of leftlower extremity with   antegrade flow.    Correlation with CTA with lower extremity runoff is recommended for   better characterization of lower extremity arterial vasculature.    < end of copied text >     reviewed elctronically  ASSESSMENT/PLAN: 	    25 minutes aggregate time was spent on this visit, 50% visit time spent in care co-ordination with other attendings and counselling patient .I have discussed care plan with patient / HCP/family member ,who expressed understanding of problems treatment and their effect and side effects, alternatives in details. I have asked if they have any questions and concerns and appropriately addressed them to best of my ability.

## 2022-04-06 NOTE — PROGRESS NOTE ADULT - PROBLEM SELECTOR PLAN 9
Concern for right lower iliac artery extremity inflow disease.  Incompletely visualized bypass graft of leftlower extremity with   antegrade flow .Vascular surgery consult requested

## 2022-04-06 NOTE — PROGRESS NOTE ADULT - SUBJECTIVE AND OBJECTIVE BOX
Patient is a 71y Female whom presented to the hospital with esrd on hd     PAST MEDICAL & SURGICAL HISTORY:  Diabetes mellitus II    HTN (hypertension)    h/o Anxiety attack    Depression    h/o Myocardial infarct 2007    CAD (coronary artery disease)    h/o Hepatitis A 1969  currently resolved, no symptoms    PAD (peripheral artery disease)    Murmur, cardiac    h/o Smoking  quitted 3/2012    CRF (chronic renal failure), unspecified stage    Dialysis patient    Anemia secondary to renal failure    HTN (hypertension)    coronary stent 2007    s/p Ovarian cyst removal    s/p surgical removal of benign Skin lesion epigastric area        MEDICATIONS  (STANDING):  dextrose 5%. 1000 milliLiter(s) (100 mL/Hr) IV Continuous <Continuous>  dextrose 5%. 1000 milliLiter(s) (50 mL/Hr) IV Continuous <Continuous>  dextrose 50% Injectable 25 Gram(s) IV Push once  dextrose 50% Injectable 12.5 Gram(s) IV Push once  dextrose 50% Injectable 25 Gram(s) IV Push once  glucagon  Injectable 1 milliGRAM(s) IntraMuscular once  insulin lispro (ADMELOG) corrective regimen sliding scale   SubCutaneous three times a day before meals  piperacillin/tazobactam IVPB.. 3.375 Gram(s) IV Intermittent every 12 hours      Allergies    latex (Unknown)  No Known Drug Allergies    Intolerances        SOCIAL HISTORY:  Denies ETOh,Smoking,     FAMILY HISTORY:      REVIEW OF SYSTEMS:    CONSTITUTIONAL: No weakness, fevers or chills  RESPIRATORY: No cough, wheezing, hemoptysis; No shortness of breath  CARDIOVASCULAR: No chest pain or palpitations  GASTROINTESTINAL: No abdominal or epigastric pain. No nausea, vomiting,     No diarrhea or constipation. No melena   GENITOURINARY: No dysuria, frequency or hematuria                              10.3   17.66 )-----------( 295      ( 06 Apr 2022 05:22 )             30.7       CBC Full  -  ( 06 Apr 2022 05:22 )  WBC Count : 17.66 K/uL  RBC Count : 3.28 M/uL  Hemoglobin : 10.3 g/dL  Hematocrit : 30.7 %  Platelet Count - Automated : 295 K/uL  Mean Cell Volume : 93.6 fl  Mean Cell Hemoglobin : 31.4 pg  Mean Cell Hemoglobin Concentration : 33.6 gm/dL  Auto Neutrophil # : 14.82 K/uL  Auto Lymphocyte # : 0.79 K/uL  Auto Monocyte # : 1.85 K/uL  Auto Eosinophil # : 0.01 K/uL  Auto Basophil # : 0.06 K/uL  Auto Neutrophil % : 83.9 %  Auto Lymphocyte % : 4.5 %  Auto Monocyte % : 10.5 %  Auto Eosinophil % : 0.1 %  Auto Basophil % : 0.3 %      04-06    136  |  96  |  51<H>  ----------------------------<  213<H>  4.8   |  25  |  7.20<H>    Ca    9.6      06 Apr 2022 05:22  Phos  4.9     04-05  Mg     2.3     04-05    TPro  7.4  /  Alb  3.0<L>  /  TBili  0.7  /  DBili  x   /  AST  19  /  ALT  17  /  AlkPhos  90  04-05      CAPILLARY BLOOD GLUCOSE      POCT Blood Glucose.: 212 mg/dL (06 Apr 2022 17:00)  POCT Blood Glucose.: 267 mg/dL (06 Apr 2022 14:58)  POCT Blood Glucose.: 170 mg/dL (06 Apr 2022 08:29)  POCT Blood Glucose.: 204 mg/dL (05 Apr 2022 21:03)      Vital Signs Last 24 Hrs  T(C): 36.8 (06 Apr 2022 16:30), Max: 38.2 (05 Apr 2022 22:10)  T(F): 98.2 (06 Apr 2022 16:30), Max: 100.8 (05 Apr 2022 22:10)  HR: 128 (06 Apr 2022 16:30) (91 - 128)  BP: 147/98 (06 Apr 2022 16:30) (132/71 - 184/65)  BP(mean): --  RR: 18 (06 Apr 2022 16:30) (18 - 20)  SpO2: 96% (06 Apr 2022 16:30) (95% - 99%)        PT/INR - ( 05 Apr 2022 03:35 )   PT: 12.8 sec;   INR: 1.09 ratio         PTT - ( 05 Apr 2022 03:35 )  PTT:28.6 sec                PHYSICAL EXAM:    Constitutional: NAD  HEENT: conjunctive   clear   Neck:  No JVD  Respiratory: decrease bs b/l   Cardiovascular: S1 and S2  Gastrointestinal: BS+, soft, NT/ND  Extremities: No peripheral edema

## 2022-04-06 NOTE — PROGRESS NOTE ADULT - PROBLEM SELECTOR PLAN 2
- Recommend vascular consult  - Arterial duplex: Bilateral lower extremity atheromatous disease.  Progressively delayed upstroke of right common femoral, superficial femoral, and popliteal arteries noted. The possibility of inflow significant stenosis cannot be excluded. Trifurcation arteries are not visualized due to patient motion. Right dorsalis pedis artery could not be assessed due to overlying bandage material.    Incompletely imaged bypass graft of left lower extremity, which demonstrates antegrade flow. The full extent of the bypass graft is not adequately assessed on this study. The left superficial femoral artery demonstrates minimal flow. Trifurcation arteries are not visualized due to patient motion.

## 2022-04-06 NOTE — PATIENT PROFILE ADULT - NSTOBACCONEVERSMOKERY/N_GEN_A
Occupational Therapy  Visit Type: initial evaluation  Co-treat with: Physical therapist  Precautions:  Medical precautions:  fall risk; standard precautions.    -Left foot drop, has AFO in shoe  -Wear shoes when up  Lines:     Basic: capped IV and telemetry      Lines in chart and on patient reviewed, cautions maintained throughout session.  Safety Measures: chair alarm    SUBJECTIVE  Patient agreed to participate in therapy this date.  Patient verbally agrees to allow the following to be present during session: relative  Pt resting in bed and agreeable to OT evaluation. Pt very pleasant and with no complaints this morning other than mild SOB with activity. SpO2 upper 90s. Pt's sister's arrived in midst of session and pt agreeable to them being present during session.  Patient / Family Goal: maximize function and return home    Pain     Location: chronic left hip discomfort standing from toilet, not rated    OBJECTIVE   Level of consciousness: alert    Oriented to person, place, time and situation     Affect/Behavior: alert, cooperative and pleasant  Functional Communication/Cognition    Overall status:  Within functional limits  Range of Motion (measured in degrees unless otherwise indicated)  WFL: LUE RUE  ROM Details: WFL based on observation with ADLs/functional mobility.  Balance  Sitting:    Static:  supervision and modified independent (supervision sitting EOB and on toilet for safety, back unsupported) back unsupported and back support     Dynamic:  minimal assist (posterior lean while donning socks, min A for advancing to upright position, pt pulled on PT's hand) back unsupported  Standing - Firm Surface - Eyes Open:     Static: supervision double upper extremity support    Dynamic:  supervision double upper extremity support    Bed mobility:      Supine to sit: set up (pulled on PT's hand to advance trunk, bed flat, bed rail use, does not have a bed rail at Group Home, pt typically uses momentum to  stand)  Training completed:    Tasks: supine to sit    Education details: patient safety and patient requires additional training  Transfers:    Assistive devices: 1 person, 2-wheeled walker and gait belt    Sit to stand: supervision    Stand to sit: supervision    Bed to chair: supervision, type: stand pivot (no seated rest break required after pivot, pt able to ambulate to bathroom after pivot)    Training completed:    Tasks: sit to stand and stand to sit    Education details: patient safety    Staff provides supervision at baseline.  Functional Ambulation:    Assistance:supervision   Assistive device:1 person, gait belt and 2-wheeled walker    Distance (ft): 15;20    Surface: even      Education details: patient safety  Details/Comments: Pt ambulating at baseline. Pt reports staff members stand behind pt while pt is ambulatory.  Activities of Daily Living (ADLs):  Grooming/Oral Hygiene:     Grooming assist: supervision    Oral hygiene assist: supervision    Position: standing at sink    Assist needed for: wash/dry hands, wash/dry face and increased time to complete  Upper Body Dressing:    Assist: set up    Position: chair    Assist needed for: thread left upper extermity and thread right upper extermity  Lower Body Dressing:     Footwear assistance: minimal assist (posterior lean while donning socks, figure four technique donning R shoe)    Footwear position: edge of bed    Assist needed for: don/doff right shoe, don/doff left shoe, don/doff right sock, don/doff left sock and increased time to complete (min A provided for thoroughness with adjusting socks on fully onto toes and donning L heel into L shoe)  Toilet transfer:     Assist: supervision (increased time/effort as pt transferred from standard height toilet, has raised toilet at home)    Device: 1 person, 2-wheeled walker and gait belt    Equipment: grab bar use  Toileting:     Assist: supervision    Assist needed for: perineal hygiene, clothing  management up and clothing management down (allyssa hygiene performed in standing with grab bar use)             ASSESSMENT    Impairments: balance, strength, safety awareness and bed mobility  Functional Limitations: bed mobility, toileting, dressing and bathing  Personal Occupations Profile Affected: bathing/showering, toileting/toilet hygiene, lower body dressing     Pt admitted for acute respiratory failure and nosocomial pneumonia.  Prior to admission, pt residing at a Group Home with ramp entrance.   Pt supervision with transfers and functional mobility at baseline using 4ww. Pt states staff always stands behind pt and holds onto gait belt when pt ambulatory.  Pt reports he is modified independent with toileting and total body dressing. Staff supervises pt with showering using shower chair and during sinkside ADLs in standing.  Pt reports a couple falls in past year due to losing balance posteriorly. Pt does not use supplemental O2, but uses a CPAP machine.  Pt's current level of function is supervision for transfers and functional mobility to/from bathroom in prep for ADLs. Pt supervision with grooming tasks standing sinkside/toileting for safety during clothing management and allyssa hygiene in standing with use of grab bar.  Pt donned socks/shoes seated EOB with min A for thoroughness. Pt with posterior lean EOB with dynamic sitting balance requiring min A for steadying.  Recommending return to Group Home upon discharge with 24 hr supervision from staff and assistance a as needed. Pt near his baseline.    Discharge Recommendations:  Recommendations for Discharge: OT WI: Home, 24 Hour assist         OT Identified Barriers to Discharge: generalized weakness, mild unsteadiness posteriorly while donning socks seated EOB   Skilled therapy is required to address these limitations in attempt to maximize the patient's independence.  Clinical decision making: Low - Patient has few limitations (1-3), comorbidities and/or  complexities, as noted in problem focused assessment noted above, that impact their occupational profile.  Resulting in few treatment options and no task modification consistent with low clinical decision making complexity.    End of Session:   Location: in chair  Safety measures: alarm system in place/re-engaged and call light within reach  Handoff to: nurse    PLAN  Suggestions for next session as indicated: OT Frequency: 1-2 sessions  Frequency Comments: 1-2 more sessions by 6/17: donning underwear/pants, re-address donning socks/shoes, bed mobility  Interventions: balance, ADL retraining, patient education and safety training     PRIOR LIVING SITUATION:  Information Provided By:: jean paul (06/10/21 1021)  Type of Home: Group home (06/10/21 1021)  Home Layout: One level;Ramped entrance (06/10/21 1021)  # Steps to Enter: (ramp entrance) (06/10/21 1021)  # Steps in the Home: 0 (06/10/21 1100)  Lives With: (Roommates-3) (06/10/21 1021)  Bathroom Shower/Tub: Walk-in shower (06/10/21 1021)  Bathroom Toilet: Raised(reports they ordered them, but not installed) (06/10/21 1021)  Bathroom Equipment: Grab bars in shower;Shower chair (06/10/21 1021)  Bathroom Accessibility: Entry Level;Accessible (06/10/21 1100)  Laundry on Main Level: (staff does laundry) (06/10/21 1021)  Home Equipment: Four-wheeled walker;Quad cane (06/10/21 1021)  Additional Comments: Staff members there 24/7. Uses 4ww at all times. Uses a lift chair and always lifts it up. (06/10/21 1021)    PRIOR LEVEL OF FUNCTION:  Eating: Independent (06/10/21 1021)  Grooming: Supervision (Supv)(staff members stands right next to pt) (06/10/21 1021)  Bathing: Supervision (Supv) (06/10/21 1021)  Upper Body Dressing: Modified Independent (06/10/21 1021)  Lower Body Dressing: Modified Independent (06/10/21 1021)  Toileting: Modified Independent (06/10/21 1021)  Bed Mobility: Independent (06/10/21 1021)  Transfers: Supervision (Supv) (06/10/21 1021)  Toilet Transfers:  Supervision (Supv) (06/10/21 1021)  Ambulation in the Home: (CGA, staff puts gait belt on and staff positioned behind pt) (06/10/21 1021)  Transfer Equipment: Four wheeled walker (06/10/21 1021)  History of Falls in past year: Yes (06/10/21 1021)  Number of falls in past year: 2 (06/10/21 1021)  Any fall with injury?: Yes (06/10/21 1021)  Additional Comments: Pt states he fell in bathroom and kitchen. Pt fell backwards and hit. No home O2. Goes out in community every other weekend to visit his sister. (06/10/21 1021)    Agreement to plan and goals: patient agrees with goals and treatment plan      GOALS  Review Date: 6/17/2021  Long Term Goals: (to be met by time of discharge from hospital)  Lower body dressing: Patient will complete lower body dressing at bedside modified independent.  Toileting: Patient will complete toileting modified independent.  Bathing: Patient will complete bathing using hand held shower (shower chair), supervision Toilet transfer: Patient will complete toilet transfer with 2-wheeled walker and gait belt (on/off raised toilet), modified independent.         Documented in the chart in the following areas: Prior Level of Function. Assessment. Plan.      Therapy procedure time and total treatment time can be found documented on the Time Entry flowsheet   Yes

## 2022-04-06 NOTE — CHART NOTE - NSCHARTNOTEFT_GEN_A_CORE
Called by RN for Pt in Afib with RVR on monitor while in HD. 4 beats of Vtach on monitor. Patient seen and examined at bedside. Patient appears comfortable on venti mask. Denies palpitations, CP, lightheadedness, SOB. Upon my arrival pt was converted to SR on monitor with HR in the 90's. VS were stable.         T(C): 37 (04-06-22 @ 14:50), Max: 38.2 (04-05-22 @ 22:10)  HR: 91 (04-06-22 @ 14:50) (91 - 99)  BP: 174/62 (04-06-22 @ 14:50) (132/71 - 184/65)  RR: 19 (04-06-22 @ 14:50) (19 - 20)  SpO2: 98% (04-06-22 @ 14:50) (95% - 99%)  Wt(kg): --            Assessment/Plan  71yFemale admitted for SOB 2/2 missed HD, now with paf during HD    1. Pt converted to SR upon arrival. Hemodynamically stable. C/w tele monitoring   2. Call placed out to cardiologist, Dr. James, who recommended STAT dose of 25mg metoprolol and to change daily metoprolol dose to 100mg BID with hold parameters.  3. HD paused by HD RN; Nephrologist, Dr. Perez, called and updated -- will reach out to ICU for possible HD is ICU tomorrow.   4. Mag and phos added to morning labs.      -s.[email or web address]  [email or web address]

## 2022-04-06 NOTE — DIETITIAN INITIAL EVALUATION ADULT. - OTHER INFO
72 yo female with PMH of DMT2, Htn, ESRD, anemia, PAD, Afib, CAD, poor compliance presents with SOB after having missed 2 HD sessions found to have hyperkalemia.  Admitted  to telemetry unit for monitoring.     Historical chart review reveals wt hx of 159# in 2019 with then reported UBW of 156#. Current wt 133/138#. 70 yo female with PMH of DMT2, Htn, ESRD, anemia, PAD, Afib, CAD, poor compliance presents with SOB after having missed 2 HD sessions found to have hyperkalemia.  Admitted  to telemetry unit for monitoring.     Historical chart review reveals wt hx of 159# in 2019 with then reported UBW of 156#. Current wt 133/138#.  26#/16% wt loss of ?~6 mo.  A1c 8.3% indicative of suboptimal BS control.

## 2022-04-06 NOTE — PROGRESS NOTE ADULT - SUBJECTIVE AND OBJECTIVE BOX
[facility]  INFECTIOUS DISEASES - [address], [place] [address]  Tel: [phone]     Fax: [phone]  =======================================================    SHILO KOHLER [id number]    Follow up: Tmax of 100.8 overnight. Also desated overnight and placed on ventimask. This morning on O2 via nasal cannula. She says breathing has improved, and has mild nonproductive cough. Had some mild foot pain during dressing change but otherwise denies any change. She says she had 1 bowel movement yesterday but none after that.    Allergies:  latex (Unknown)  No Known Drug Allergies      Antibiotics:  acetaminophen     Tablet .. 650 milliGRAM(s) Oral every 6 hours PRN  aluminum hydroxide/magnesium hydroxide/simethicone Suspension 30 milliLiter(s) Oral every 4 hours PRN  amLODIPine   Tablet 7.5 milliGRAM(s) Oral every 24 hours  aspirin enteric coated 81 milliGRAM(s) Oral daily  atorvastatin 40 milliGRAM(s) Oral at bedtime  calcium acetate 667 milliGRAM(s) Oral three times a day with meals  cloNIDine 0.1 milliGRAM(s) Oral every 12 hours  clopidogrel Tablet 75 milliGRAM(s) Oral daily  dextrose 5%. 1000 milliLiter(s) IV Continuous <Continuous>  dextrose 5%. 1000 milliLiter(s) IV Continuous <Continuous>  dextrose 50% Injectable 25 Gram(s) IV Push once  dextrose 50% Injectable 12.5 Gram(s) IV Push once  dextrose 50% Injectable 25 Gram(s) IV Push once  dextrose Oral Gel 15 Gram(s) Oral once PRN  epoetin fawad-epbx (RETACRIT) Injectable 84897 Unit(s) IV Push <User Schedule>  glucagon  Injectable 1 milliGRAM(s) IntraMuscular once  heparin   Injectable 5000 Unit(s) SubCutaneous every 12 hours  imipramine 50 milliGRAM(s) Oral at bedtime  insulin glargine Injectable (LANTUS) 7 Unit(s) SubCutaneous every morning  insulin lispro (ADMELOG) corrective regimen sliding scale   SubCutaneous at bedtime  insulin lispro (ADMELOG) corrective regimen sliding scale   SubCutaneous three times a day before meals  lactobacillus acidophilus 1 Tablet(s) Oral two times a day  melatonin 3 milliGRAM(s) Oral at bedtime PRN  metoprolol succinate  milliGRAM(s) Oral daily  Nephro-elvie 1 Tablet(s) Oral daily  ondansetron Injectable 4 milliGRAM(s) IV Push every 8 hours PRN  pantoprazole    Tablet 40 milliGRAM(s) Oral before breakfast  piperacillin/tazobactam IVPB. 3.375 Gram(s) IV Intermittent once  piperacillin/tazobactam IVPB.. 3.375 Gram(s) IV Intermittent every 12 hours  traMADol 50 milliGRAM(s) Oral three times a day PRN       REVIEW OF SYSTEMS:  CONSTITUTIONAL:  (+) fever  HEENT:  No sore throat or runny nose.  CARDIOVASCULAR:  No chest pain  RESPIRATORY: see history  GASTROINTESTINAL:  No nausea, vomiting. no abdominal pain  GENITOURINARY:  No dysuria, frequency or urgency.  MUSCULOSKELETAL:  no back pain  NEUROLOGIC:  No headache, no dizziness  PSYCHIATRIC:  No disorder of thought or mood.       Physical Exam:  ICU Vital Signs Last 24 Hrs  T(C): 36.7 (06 Apr 2022 05:13), Max: 38.2 (05 Apr 2022 22:10)  T(F): 98 (06 Apr 2022 05:13), Max: 100.8 (05 Apr 2022 22:10)  HR: 98 (06 Apr 2022 05:13) (97 - 124)  BP: 154/69 (06 Apr 2022 05:13) (132/71 - 184/65)  BP(mean): --  ABP: --  ABP(mean): --  RR: 19 (06 Apr 2022 05:13) (18 - 20)  SpO2: 95% (06 Apr 2022 05:13) (95% - 99%)     GEN: NAD  HEENT: normocephalic and atraumatic.     NECK: Supple.   LUNGS: Clear to auscultation.  HEART: Regular rate and rhythm  ABDOMEN: Soft, nontender, and nondistended.   EXTREMITIES: No knee swelling, no leg edema. RUE AV fistula  NEUROLOGIC: AOx3  PSYCHIATRIC: Appropriate affect .  SKIN: R 1st and 2nd toe ulcers, no drainage, minimal surrounding erythema but not swelling noted  left medial heel ulcer, no drainage, no surrounding swelling or erythema    Labs:  04-06    136  |  96  |  51<H>  ----------------------------<  213<H>  4.8   |  25  |  7.20<H>    Ca    9.6      06 Apr 2022 05:22  Phos  4.9     04-05  Mg     2.3     04-05    TPro  7.4  /  Alb  3.0<L>  /  TBili  0.7  /  DBili  x   /  AST  19  /  ALT  17  /  AlkPhos  90  04-05                          10.3   17.66 )-----------( 295      ( 06 Apr 2022 05:22 )             30.7     PT/INR - ( 05 Apr 2022 03:35 )   PT: 12.8 sec;   INR: 1.09 ratio         PTT - ( 05 Apr 2022 03:35 )  PTT:28.6 sec    LIVER FUNCTIONS - ( 05 Apr 2022 15:19 )  Alb: 3.0 g/dL / Pro: 7.4 g/dL / ALK PHOS: 90 U/L / ALT: 17 U/L / AST: 19 U/L / GGT: x             RECENT CULTURES:  04-05 @ 09:28 .Blood Blood-Venous     No growth to date.        03-30 @ 18:08 .Tissue Other, Left Medial Ankle Klebsiella oxytoca /Raoutella ornithinolytica  Escherichia coli  Staphylococcus aureus    Few Klebsiella oxytoca/Raoutella ornithinolytica  Few Escherichia coli  Moderate Staphylococcus aureus    Few polymorphonuclear leukocytes per low power field  Moderate Gram positive cocci in pairs per oil power field  Rare Gram Negative Rods per oil power field            All imaging and data are reviewed.     CT chest:  FINDINGS:    LUNGS AND AIRWAYS: Central airways are patent. There are bilateral   groundglass opacities and interlobular septal thickening of the lung   parenchyma. Small to moderate bilateral pleural effusions with adjacent   compressive atelectasis. Few peripheral clustered nodules of the right   upper lobe may represent distal airways mucus impaction or small airways   infection.  PLEURA: Small to moderate pleural effusions. No pneumothorax.  MEDIASTINUM AND ZOHAIB: Prominent pretracheal node measures 9 mm. No   enlarged lymph nodes of the thorax by CT size criteria. Esophagus is   underdistended.  VESSELS: Atheromatous changes of the thoracic aorta and great vessels. No   aneurysm of thoracic aorta. Main pulmonary artery size is borderline, 3   cm.  HEART: Heart size within normal limits. Aortic valve and coronary   calcification versus stenting. Correlate with procedural history. No   pericardial effusion.  CHEST WALL AND LOWER NECK: No chest wall hematoma. Left breast coarse   calcification  VISUALIZED UPPER ABDOMEN: Coarse calcification right hepatic lobe.   Vascular atheromatous changes partially imaged.  BONES: Degenerative changes.    IMPRESSION:    Small to moderate symmetric pleural effusions with adjacent compressive   atelectasis.    Lung parenchymal groundglass and interlobular septal thickening   suggestive of pulmonary edema/fluid overload. Superimposed infection   cannot excluded.    Few clustered nodules of the right upper lobe in peripheral location may   reflect distal airways mucus impaction or small airways infection.    Aortic valve and coronary artery calcification versus stenting. Correlate   with procedural history.

## 2022-04-06 NOTE — PROGRESS NOTE ADULT - SUBJECTIVE AND OBJECTIVE BOX
HPI:  70yo female bib ems with sob, pt states she has missed the last 2 rounds of dialysis and is due today, and has been having worsening sob, no cough, fever, chills, no chest pain no other complaints Found to have hyperkalemia Admitted  to telemetry unit for monitoring. Pt seen this morning for Right 2nd toe infection and left ankle ulcer. Pt is currently in isolation, to rule out c.diff. Pt AAOX3 in NAD. Pt denies and constitutional symptoms.     REVIEW OF SYSTEMS    PAST MEDICAL & SURGICAL HISTORY:  Diabetes mellitus II    HTN (hypertension)    h/o Anxiety attack    Depression    h/o Myocardial infarct 2007    CAD (coronary artery disease)    h/o Hepatitis A 1969  currently resolved, no symptoms    PAD (peripheral artery disease)    Murmur, cardiac    h/o Smoking  quitted 3/2012    CRF (chronic renal failure), unspecified stage    Dialysis patient    Anemia secondary to renal failure    HTN (hypertension)    coronary stent 2007    s/p Ovarian cyst removal    s/p surgical removal of benign Skin lesion epigastric area        Allergies    latex (Unknown)  No Known Drug Allergies    Intolerances    Social History: former cigarette smoker  no etoh or tobacco reported (05 Apr 2022 07:22)      MEDICATIONS  (STANDING):  amLODIPine   Tablet 5 milliGRAM(s) Oral daily  aspirin enteric coated 81 milliGRAM(s) Oral daily  atorvastatin 40 milliGRAM(s) Oral at bedtime  calcium acetate 667 milliGRAM(s) Oral three times a day with meals  cloNIDine 0.1 milliGRAM(s) Oral at bedtime  clopidogrel Tablet 75 milliGRAM(s) Oral daily  dextrose 5%. 1000 milliLiter(s) (100 mL/Hr) IV Continuous <Continuous>  dextrose 5%. 1000 milliLiter(s) (50 mL/Hr) IV Continuous <Continuous>  dextrose 50% Injectable 25 Gram(s) IV Push once  dextrose 50% Injectable 12.5 Gram(s) IV Push once  dextrose 50% Injectable 25 Gram(s) IV Push once  epoetin fawad-epbx (RETACRIT) Injectable 48239 Unit(s) IV Push <User Schedule>  glucagon  Injectable 1 milliGRAM(s) IntraMuscular once  heparin   Injectable 5000 Unit(s) SubCutaneous every 12 hours  insulin lispro (ADMELOG) corrective regimen sliding scale   SubCutaneous three times a day before meals  insulin regular  human recombinant 10 Unit(s) SubCutaneous Once  metoprolol succinate  milliGRAM(s) Oral daily  multivitamin 1 Tablet(s) Oral daily  pantoprazole    Tablet 40 milliGRAM(s) Oral before breakfast  piperacillin/tazobactam IVPB.. 3.375 Gram(s) IV Intermittent every 12 hours    MEDICATIONS  (PRN):  acetaminophen     Tablet .. 650 milliGRAM(s) Oral every 6 hours PRN Temp greater or equal to 38C (100.4F), Mild Pain (1 - 3)  aluminum hydroxide/magnesium hydroxide/simethicone Suspension 30 milliLiter(s) Oral every 4 hours PRN Dyspepsia  dextrose Oral Gel 15 Gram(s) Oral once PRN Blood Glucose LESS THAN 70 milliGRAM(s)/deciliter  melatonin 3 milliGRAM(s) Oral at bedtime PRN Insomnia  ondansetron Injectable 4 milliGRAM(s) IV Push every 8 hours PRN Nausea and/or Vomiting      PHYSICAL EXAM:  Vascular: +1 pitting edema noted b/l. DP palpable bilaterally, Pt non- palpable bilaterally. Capillary refill 5 seconds, absent to the right 1st and second digits. Digital hair absent bilaterally  Neurological: Light touch sensation intact bilaterally  Musculoskeletal: 5/5 strength in all quadrants bilaterally, AJ & STJ ROM intact  Dermatological: Gangrenous changes noted to the right foot 1st and 2nd digit. 2 x 2 ulceration noted to the Left medial malleolus,, periwound erythema improving, no fluctuance. Probes to bone, no periwound erythema, no malodor, no proximal streaking at this time    CBC Full  -  ( 06 Apr 2022 05:22 )  WBC Count : 17.66 K/uL  RBC Count : 3.28 M/uL  Hemoglobin : 10.3 g/dL  Hematocrit : 30.7 %  Platelet Count - Automated : 295 K/uL  Mean Cell Volume : 93.6 fl  Mean Cell Hemoglobin : 31.4 pg  Mean Cell Hemoglobin Concentration : 33.6 gm/dL  Auto Neutrophil # : 14.82 K/uL  Auto Lymphocyte # : 0.79 K/uL  Auto Monocyte # : 1.85 K/uL  Auto Eosinophil # : 0.01 K/uL  Auto Basophil # : 0.06 K/uL  Auto Neutrophil % : 83.9 %  Auto Lymphocyte % : 4.5 %  Auto Monocyte % : 10.5 %  Auto Eosinophil % : 0.1 %  Auto Basophil % : 0.3 %    04-06    136  |  96  |  51<H>  ----------------------------<  213<H>  4.8   |  25  |  7.20<H>    Ca    9.6      06 Apr 2022 05:22  Phos  4.9     04-05  Mg     2.3     04-05    TPro  7.4  /  Alb  3.0<L>  /  TBili  0.7  /  DBili  x   /  AST  19  /  ALT  17  /  AlkPhos  90  04-05    ICU Vital Signs Last 24 Hrs  T(C): 37 (06 Apr 2022 14:50), Max: 38.2 (05 Apr 2022 22:10)  T(F): 98.6 (06 Apr 2022 14:50), Max: 100.8 (05 Apr 2022 22:10)  HR: 91 (06 Apr 2022 14:50) (91 - 99)  BP: 174/62 (06 Apr 2022 14:50) (132/71 - 184/65)  RR: 19 (06 Apr 2022 14:50) (19 - 20)  SpO2: 98% (06 Apr 2022 14:50) (95% - 99%)      ACC: [id number] EXAM: US DPLX LWR EXT ARTS COMPL BI    PROCEDURE DATE: 04/06/2022        INTERPRETATION: CLINICAL INDICATION: Lower extremity wounds. Evaluate for lower extremity arterial pathology.    EXAMINATION: Bilateral lower extremity arterial duplex ultrasound    COMPARISON: None    FINDINGS:    Limited evaluation as noted by technologist due to patient motion.    Bilateral lower extremity atheromatous disease.    Progressively delayed upstroke of right common femoral, superficial femoral, and popliteal arteries noted. The possibility of inflow significant stenosis cannot be excluded. Trifurcation arteries are not visualized due to patient motion. Right dorsalis pedis artery could not be assessed due to overlying bandage material.    Incompletely imaged bypass graft of left lower extremity, which demonstrates antegrade flow. The full extent of the bypass graft is not adequately assessed on this study. The left superficial femoral artery demonstrates minimal flow. Trifurcation arteries are not visualized due to patient motion.    IMPRESSION:    Markedly limited study.    Concern for right lower iliac artery extremity inflow disease.    Incompletely visualized bypass graft of left lower extremity with antegrade flow.    Correlation with CTA with lower extremity runoff is recommended for better characterization of lower extremity arterial vasculature.    Phone message was left with Dr. Larson's service.    --- End of Report ---      ACC: [id number] EXAM: US DPLX LWR EXT VEINS COMPL BI    PROCEDURE DATE: 04/06/2022    INTERPRETATION: CLINICAL INFORMATION: Nonhealing lower extremity wounds    COMPARISON: None available.    TECHNIQUE: Duplex sonography of the BILATERAL LOWER extremity veins with color and spectral Doppler, with and without compression.    FINDINGS:    RIGHT:  Normal compressibility of the RIGHT common femoral, femoral and popliteal veins.  Doppler examination shows normal spontaneous and phasic flow.    The right posterior tibial veins are patent and free of thrombus.    The right peroneal veins were not diagnostically imaged.    LEFT:  Normal compressibility of the LEFT common femoral, femoral and popliteal veins.  Doppler examination shows normal spontaneous and phasic flow.    The left posterior tibial and peroneal veins were not diagnostically imaged.    IMPRESSION:  No evidence of deep venous thrombosis in either lower extremity.      ACC: [id number] EXAM: XR FOOT COMP MIN 3 VIEWS BI    PROCEDURE DATE: 03/30/2022    INTERPRETATION: RIGHT and LEFT foot    CLINICAL INFORMATION: Infection of RIGHT second toe and heel ulcerations.    . TECHNIQUE: RIGHT LEFT AP,lateral and oblique foot views. 6 views    FINDINGS:  LEFT foot:  Stable posterior heel ulceration without subcutaneous air  The bones and joint spaces are intact. No radiographic evidence of osteomyelitis.  No fracture or dislocation.  Diffuse arterial vascular wall calcifications. .    RIGHT foot:  IMPRESSION:  No acute radiographic osseous pathology. No radiographic evidence of osteomyelitis.    If osteomyelitis is considered despite conservative therapy, and soft tissue / bone infection requires further assessment, follow-up MRI recommended.    --- End of Report ---

## 2022-04-06 NOTE — PROGRESS NOTE ADULT - SUBJECTIVE AND OBJECTIVE BOX
Patient is a 71y Female with a known history of :  Fluid overload [E87.70]    ESRD on dialysis [N18.6]    Poor compliance [Z91.19]    Hyperkalemia [E87.5]    DM (diabetes mellitus) [E11.9]    HTN (hypertension) [I10]    CAD (coronary artery disease) [I25.10]    Prophylactic measure [Z29.9]    Foot infection [L08.9]    Atrial fibrillation with tachycardic ventricular rate [I48.91]    Diarrhea [R19.7]      HPI:  72yo female bib ems with sob, pt states she has missed the last 2 rounds of dialysis and is due today, and has been having worsening sob, no cough, fever, chills, no chest pain no other complaints .Found to have hyperkalemia Admitted  to telemetry unit for monitoring , send 3 sets of cardiac enzymes to rule out acute coronary event, obtain ECHO to evaluate LVEF, cardiology consult  ,continue current management, O2 supply, anticoagulation plan as per cardiology consult Nephrology consult stat requested ,management d/w Dr Perez and  Palliative care consult requested ,to discuss advance directives and complete MOLST  (05 Apr 2022 07:22)      REVIEW OF SYSTEMS:    CONSTITUTIONAL: No fever, weight loss, or fatigue  EYES: No eye pain, visual disturbances, or discharge  ENMT:  No difficulty hearing, tinnitus, vertigo; No sinus or throat pain  NECK: No pain or stiffness  BREASTS: No pain, masses, or nipple discharge  RESPIRATORY: No cough, wheezing, chills or hemoptysis; No shortness of breath  CARDIOVASCULAR: No chest pain, palpitations, dizziness, or leg swelling  GASTROINTESTINAL: No abdominal or epigastric pain. No nausea, vomiting, or hematemesis; No diarrhea or constipation. No melena or hematochezia.  GENITOURINARY: No dysuria, frequency, hematuria, or incontinence  NEUROLOGICAL: No headaches, memory loss, loss of strength, numbness, or tremors  SKIN: No itching, burning, rashes, or lesions   LYMPH NODES: No enlarged glands  ENDOCRINE: No heat or cold intolerance; No hair loss  MUSCULOSKELETAL: No joint pain or swelling; No muscle, back, or extremity pain  PSYCHIATRIC: No depression, anxiety, mood swings, or difficulty sleeping  HEME/LYMPH: No easy bruising, or bleeding gums  ALLERGY AND IMMUNOLOGIC: No hives or eczema    MEDICATIONS  (STANDING):  amLODIPine   Tablet 7.5 milliGRAM(s) Oral every 24 hours  aspirin enteric coated 81 milliGRAM(s) Oral daily  atorvastatin 40 milliGRAM(s) Oral at bedtime  calcium acetate 667 milliGRAM(s) Oral three times a day with meals  cefTRIAXone   IVPB 1000 milliGRAM(s) IV Intermittent every 24 hours  cloNIDine 0.1 milliGRAM(s) Oral every 12 hours  clopidogrel Tablet 75 milliGRAM(s) Oral daily  dextrose 5%. 1000 milliLiter(s) (100 mL/Hr) IV Continuous <Continuous>  dextrose 5%. 1000 milliLiter(s) (50 mL/Hr) IV Continuous <Continuous>  dextrose 50% Injectable 25 Gram(s) IV Push once  dextrose 50% Injectable 12.5 Gram(s) IV Push once  dextrose 50% Injectable 25 Gram(s) IV Push once  epoetin fawad-epbx (RETACRIT) Injectable 77564 Unit(s) IV Push <User Schedule>  glucagon  Injectable 1 milliGRAM(s) IntraMuscular once  heparin   Injectable 5000 Unit(s) SubCutaneous every 12 hours  imipramine 50 milliGRAM(s) Oral at bedtime  insulin glargine Injectable (LANTUS) 7 Unit(s) SubCutaneous every morning  insulin lispro (ADMELOG) corrective regimen sliding scale   SubCutaneous at bedtime  insulin lispro (ADMELOG) corrective regimen sliding scale   SubCutaneous three times a day before meals  lactobacillus acidophilus 1 Tablet(s) Oral two times a day  metoprolol succinate  milliGRAM(s) Oral daily  Nephro-elvie 1 Tablet(s) Oral daily  pantoprazole    Tablet 40 milliGRAM(s) Oral before breakfast  vancomycin  IVPB 1000 milliGRAM(s) IV Intermittent once    MEDICATIONS  (PRN):  acetaminophen     Tablet .. 650 milliGRAM(s) Oral every 6 hours PRN Temp greater or equal to 38C (100.4F), Mild Pain (1 - 3)  aluminum hydroxide/magnesium hydroxide/simethicone Suspension 30 milliLiter(s) Oral every 4 hours PRN Dyspepsia  dextrose Oral Gel 15 Gram(s) Oral once PRN Blood Glucose LESS THAN 70 milliGRAM(s)/deciliter  melatonin 3 milliGRAM(s) Oral at bedtime PRN Insomnia  ondansetron Injectable 4 milliGRAM(s) IV Push every 8 hours PRN Nausea and/or Vomiting  traMADol 50 milliGRAM(s) Oral three times a day PRN Moderate Pain (4 - 6)      ALLERGIES: latex (Unknown)  No Known Drug Allergies      FAMILY HISTORY:      PHYSICAL EXAMINATION:  -----------------------------  T(C): 36.7 (04-06-22 @ 05:13), Max: 38.2 (04-05-22 @ 22:10)  HR: 98 (04-06-22 @ 05:13) (97 - 124)  BP: 154/69 (04-06-22 @ 05:13) (132/71 - 192/88)  RR: 19 (04-06-22 @ 05:13) (18 - 20)  SpO2: 95% (04-06-22 @ 05:13) (93% - 99%)  Wt(kg): --    04-05 @ 07:01  -  04-06 @ 07:00  --------------------------------------------------------  IN:  Total IN: 0 mL    OUT:    Other (mL): 1900 mL  Total OUT: 1900 mL    Total NET: -1900 mL          Weight (kg): 60.4 (04-06 @ 05:13)    VITALS  T(C): 36.7 (04-06-22 @ 05:13), Max: 38.2 (04-05-22 @ 22:10)  HR: 98 (04-06-22 @ 05:13) (97 - 124)  BP: 154/69 (04-06-22 @ 05:13) (132/71 - 192/88)  RR: 19 (04-06-22 @ 05:13) (18 - 20)  SpO2: 95% (04-06-22 @ 05:13) (93% - 99%)    Constitutional: well developed, normal appearance, well groomed, well nourished, no deformities and no acute distress.   Eyes: the conjunctiva exhibited no abnormalities and the eyelids demonstrated no xanthelasmas.   HEENT: normal oral mucosa, no oral pallor and no oral cyanosis.   Neck: normal jugular venous A waves present, normal jugular venous V waves present and no jugular venous wilson A waves.   Pulmonary: no respiratory distress, normal respiratory rhythm and effort, no accessory muscle use and lungs were clear to auscultation bilaterally.   Cardiovascular: heart rate and rhythm were normal, normal S1 and S2 and no murmur, gallop, rub, heave or thrill are present.   Abdomen: soft, non-tender, no hepato-splenomegaly and no abdominal mass palpated.   Musculoskeletal: the gait could not be assessed..   Extremities: no clubbing of the fingernails, no localized cyanosis, no petechial hemorrhages and no ischemic changes.   Skin: normal skin color and pigmentation, no rash, no venous stasis, no skin lesions, no skin ulcer and no xanthoma was observed.   Psychiatric: oriented to person, place, and time, the affect was normal, the mood was normal and not feeling anxious.     LABS:   --------  04-06    136  |  96  |  51<H>  ----------------------------<  213<H>  4.8   |  25  |  7.20<H>    Ca    9.6      06 Apr 2022 05:22  Phos  4.9     04-05  Mg     2.3     04-05    TPro  7.4  /  Alb  3.0<L>  /  TBili  0.7  /  DBili  x   /  AST  19  /  ALT  17  /  AlkPhos  90  04-05                         10.3   17.66 )-----------( 295      ( 06 Apr 2022 05:22 )             30.7     PT/INR - ( 05 Apr 2022 03:35 )   PT: 12.8 sec;   INR: 1.09 ratio         PTT - ( 05 Apr 2022 03:35 )  PTT:28.6 sec            RADIOLOGY:  -----------------    ECG:     ECHO:

## 2022-04-06 NOTE — PROGRESS NOTE ADULT - NUTRITIONAL ASSESSMENT
MEDICATIONS  (STANDING):  amLODIPine   Tablet 7.5 milliGRAM(s) Oral every 24 hours  aspirin enteric coated 81 milliGRAM(s) Oral daily  atorvastatin 40 milliGRAM(s) Oral at bedtime  calcium acetate 667 milliGRAM(s) Oral three times a day with meals  cloNIDine 0.1 milliGRAM(s) Oral every 12 hours  clopidogrel Tablet 75 milliGRAM(s) Oral daily  dextrose 5%. 1000 milliLiter(s) (100 mL/Hr) IV Continuous <Continuous>  dextrose 5%. 1000 milliLiter(s) (50 mL/Hr) IV Continuous <Continuous>  dextrose 50% Injectable 25 Gram(s) IV Push once  dextrose 50% Injectable 12.5 Gram(s) IV Push once  dextrose 50% Injectable 25 Gram(s) IV Push once  epoetin fawad-epbx (RETACRIT) Injectable 36116 Unit(s) IV Push <User Schedule>  glucagon  Injectable 1 milliGRAM(s) IntraMuscular once  heparin   Injectable 5000 Unit(s) SubCutaneous every 12 hours  imipramine 50 milliGRAM(s) Oral at bedtime  insulin glargine Injectable (LANTUS) 7 Unit(s) SubCutaneous every morning  insulin lispro (ADMELOG) corrective regimen sliding scale   SubCutaneous at bedtime  insulin lispro (ADMELOG) corrective regimen sliding scale   SubCutaneous three times a day before meals  lactobacillus acidophilus 1 Tablet(s) Oral two times a day  Nephro-elvie 1 Tablet(s) Oral daily  pantoprazole    Tablet 40 milliGRAM(s) Oral before breakfast  piperacillin/tazobactam IVPB.. 3.375 Gram(s) IV Intermittent every 12 hours

## 2022-04-06 NOTE — PROGRESS NOTE ADULT - ASSESSMENT
esrd- admitted with sob- missed dialysis x2  hyperkalemia- improved  chf  k 4.8  ashd- s/p coronary stent  s/p cabg  hypertension  dyslipidemia  pvd- s/p bypass left leg  dialysis as per renal asap- discussed with pcp and renal  spoke to  4/5  to have hd again

## 2022-04-06 NOTE — PROGRESS NOTE ADULT - SUBJECTIVE AND OBJECTIVE BOX
Date/Time Patient Seen:  		  Referring MD:   Data Reviewed	       Patient is a 71y old  Female who presents with a chief complaint of shortness of breath (05 Apr 2022 12:34)      Subjective/HPI     PAST MEDICAL & SURGICAL HISTORY:  Diabetes mellitus II    HTN (hypertension)    h/o Anxiety attack    Depression    h/o Myocardial infarct 2007    CAD (coronary artery disease)    CAD (coronary artery disease)    h/o Hepatitis A 1969  currently resolved, no symptoms    PAD (peripheral artery disease)    Murmur, cardiac    h/o Smoking  quitted 3/2012    CRF (chronic renal failure), unspecified stage    Dialysis patient    Anemia secondary to renal failure    HTN (hypertension)    coronary stent 2007    s/p Ovarian cyst removal    s/p surgical removal of benign Skin lesion epigastric area          Medication list         MEDICATIONS  (STANDING):  amLODIPine   Tablet 7.5 milliGRAM(s) Oral every 24 hours  aspirin enteric coated 81 milliGRAM(s) Oral daily  atorvastatin 40 milliGRAM(s) Oral at bedtime  calcium acetate 667 milliGRAM(s) Oral three times a day with meals  cefTRIAXone   IVPB 1000 milliGRAM(s) IV Intermittent every 24 hours  cloNIDine 0.1 milliGRAM(s) Oral every 12 hours  clopidogrel Tablet 75 milliGRAM(s) Oral daily  dextrose 5%. 1000 milliLiter(s) (100 mL/Hr) IV Continuous <Continuous>  dextrose 5%. 1000 milliLiter(s) (50 mL/Hr) IV Continuous <Continuous>  dextrose 50% Injectable 25 Gram(s) IV Push once  dextrose 50% Injectable 12.5 Gram(s) IV Push once  dextrose 50% Injectable 25 Gram(s) IV Push once  epoetin fawad-epbx (RETACRIT) Injectable 44307 Unit(s) IV Push <User Schedule>  glucagon  Injectable 1 milliGRAM(s) IntraMuscular once  heparin   Injectable 5000 Unit(s) SubCutaneous every 12 hours  imipramine 50 milliGRAM(s) Oral at bedtime  insulin glargine Injectable (LANTUS) 7 Unit(s) SubCutaneous every morning  insulin lispro (ADMELOG) corrective regimen sliding scale   SubCutaneous at bedtime  insulin lispro (ADMELOG) corrective regimen sliding scale   SubCutaneous three times a day before meals  lactobacillus acidophilus 1 Tablet(s) Oral two times a day  metoprolol succinate  milliGRAM(s) Oral daily  Nephro-elvie 1 Tablet(s) Oral daily  pantoprazole    Tablet 40 milliGRAM(s) Oral before breakfast    MEDICATIONS  (PRN):  acetaminophen     Tablet .. 650 milliGRAM(s) Oral every 6 hours PRN Temp greater or equal to 38C (100.4F), Mild Pain (1 - 3)  aluminum hydroxide/magnesium hydroxide/simethicone Suspension 30 milliLiter(s) Oral every 4 hours PRN Dyspepsia  dextrose Oral Gel 15 Gram(s) Oral once PRN Blood Glucose LESS THAN 70 milliGRAM(s)/deciliter  melatonin 3 milliGRAM(s) Oral at bedtime PRN Insomnia  ondansetron Injectable 4 milliGRAM(s) IV Push every 8 hours PRN Nausea and/or Vomiting  traMADol 50 milliGRAM(s) Oral three times a day PRN Moderate Pain (4 - 6)         Vitals log        ICU Vital Signs Last 24 Hrs  T(C): 36.7 (06 Apr 2022 05:13), Max: 38.2 (05 Apr 2022 22:10)  T(F): 98 (06 Apr 2022 05:13), Max: 100.8 (05 Apr 2022 22:10)  HR: 98 (06 Apr 2022 05:13) (91 - 124)  BP: 154/69 (06 Apr 2022 05:13) (132/71 - 192/88)  BP(mean): --  ABP: --  ABP(mean): --  RR: 19 (06 Apr 2022 05:13) (18 - 20)  SpO2: 95% (06 Apr 2022 05:13) (93% - 99%)           Input and Output:  I&O's Detail    05 Apr 2022 07:01  -  06 Apr 2022 06:24  --------------------------------------------------------  IN:  Total IN: 0 mL    OUT:    Other (mL): 1900 mL  Total OUT: 1900 mL    Total NET: -1900 mL          Lab Data                        10.3   17.66 )-----------( 295      ( 06 Apr 2022 05:22 )             30.7     04-06    136  |  96  |  51<H>  ----------------------------<  213<H>  4.8   |  25  |  7.20<H>    Ca    9.6      06 Apr 2022 05:22  Phos  4.9     04-05  Mg     2.3     04-05    TPro  7.4  /  Alb  3.0<L>  /  TBili  0.7  /  DBili  x   /  AST  19  /  ALT  17  /  AlkPhos  90  04-05            Review of Systems	      Objective     Physical Examination    heart s1s2  lung dec BS  abd soft      Pertinent Lab findings & Imaging      Dexter:  NO   Adequate UO     I&O's Detail    05 Apr 2022 07:01  -  06 Apr 2022 06:24  --------------------------------------------------------  IN:  Total IN: 0 mL    OUT:    Other (mL): 1900 mL  Total OUT: 1900 mL    Total NET: -1900 mL               Discussed with:     Cultures:	        Radiology

## 2022-04-06 NOTE — DIETITIAN INITIAL EVALUATION ADULT. - PROBLEM SELECTOR PLAN 10
patient was explained importance of compliance ,social service input ( home situation and compliance issues ) May require placement at [facility]

## 2022-04-06 NOTE — PROGRESS NOTE ADULT - ASSESSMENT
70yo female bib ems with sob, pt states she has missed the last 2 rounds of dialysis and is due today, and has been having worsening sob    remains with hypoxemia  ID following  on ABX  overnight events noted  cxr noted  will check CT chest  MR of foot planned  TTE planned      pt wishes to have HD  renal - dr alvarez  labs and imaging reviewed  on supplemental o2 support  monitor labs - lytes  HD as per Renal team  GOC documented - Spouse Geoffrey - HCP  pt is full code - wants an attempt at Resuscitation - pt does not want to linger on machines

## 2022-04-06 NOTE — DIETITIAN INITIAL EVALUATION ADULT. - PERTINENT MEDS FT
Plavix, Zosyn, Lantus 7U qam, Lispro coverage, Lipitor, Phoslo, Retacrit, Lactobacillus, Nephrovite, Zofran

## 2022-04-06 NOTE — DIETITIAN INITIAL EVALUATION ADULT. - ORAL INTAKE PTA/DIET HISTORY
Pt lives at home with spouse.  He doesn't cook so she has to do all meals. Started using [employer] delivery recently so that has been helpful. Reports dental issues past few months causing decreased intake.  States got new dentures but she is getting used to them and they hurt.  Has had some insurance issues with getting DM supplies covered so hadnt been testing as much.   now diabetic as well so has been using his supplies and test 2-3x /day now.  Doesn't follow any therapeutic diet per se, doesn't use table salt except on eggs but frequently they order in for dinner meals. Breakfast is typically scrambled eggs and yip sometimes, oatmeal, she skips lunch on HD days and dinner often is take out. Chicken, rice and beans.  Admits to eating a lot of fruit recently. Endorses wt loss, thinks it happened more recently maybe in past 6 months or so but cannot confirm.

## 2022-04-06 NOTE — CHART NOTE - NSCHARTNOTEFT_GEN_A_CORE
Called by RN for patient with intermittent episodes of afib with HR 180s, non-sustained. Per tele-monitor, HR currently 90s. Events from this afternoon noted, pt with increased dose of lopressor. Pt seen and examined at bedside. Pt sleeping comfortably in bed. Admits to having a short-term episode of palpitations earlier today while she was eating crackers. Otherwise feels well. Denies any chest pain or shortness of breath.     - No acute interventions needed at this time.   - Continue to monitor on tele  - RN to call for any significant changes Called by RN for patient with intermittent episodes of afib with HR 180s, non-sustained. Per tele-monitor, HR currently 90s. Events from this afternoon noted, pt with increased dose of lopressor. Pt seen and examined at bedside. Pt sleeping comfortably in bed. Admits to having a short-term episode of palpitations earlier today while she was eating crackers. Otherwise feels well. Denies any chest pain or shortness of breath.     - No acute interventions needed at this time.   - Continue to monitor on tele  - RN to call for any significant changes        ADDENDUM(4:30 AM): Called by RN for pt with  -130s sustained. Will order for AM lopressor dose to be given now. Called by RN for patient with intermittent episodes of afib with HR 180s, non-sustained. Per tele-monitor, HR currently 90s. Events from this afternoon noted, pt with increased dose of lopressor. Pt seen and examined at bedside. Pt sleeping comfortably in bed. Admits to having a short-term episode of palpitations earlier today while she was eating crackers. Otherwise feels well. Denies any chest pain or shortness of breath.     - No acute interventions needed at this time.   - Continue to monitor on tele  - RN to call for any significant changes        ADDENDUM(4:30 AM): Called by RN for pt with  -130s sustained. BP stable. Will order for AM lopressor dose to be given now. Called by RN for patient with intermittent episodes of afib with HR 180s, non-sustained. Per tele-monitor, HR currently 90s. Events from this afternoon noted, pt with increased dose of lopressor. Pt seen and examined at bedside. Pt sleeping comfortably in bed. Admits to having a short-term episode of palpitations earlier today while she was eating crackers. Otherwise feels well. Denies any chest pain or shortness of breath.     - No acute interventions needed at this time.   - Continue to monitor on tele  - RN to call for any significant changes          ADDENDUM(4:30 AM): Called by RN for elevated HR. Per telemonitor, Pt with  -140s sustained >120. BP stable. Pt woken up from sleep. Denies any chest pain or palpitations.     - Will order for Lopressor 5mg IVP x 1 now  - Will continue to monitor on tele  - Discussed with Cardio Dr. James, who agrees with above plan. Called by RN for patient with intermittent episodes of afib with HR 180s, non-sustained. Per tele-monitor, HR currently 90s. Events from this afternoon noted, pt with increased dose of lopressor. Pt seen and examined at bedside. Pt sleeping comfortably in bed. Admits to having a short-term episode of palpitations earlier today while she was eating crackers. Otherwise feels well. Denies any chest pain or shortness of breath.     - No acute interventions needed at this time.   - Continue to monitor on tele  - RN to call for any significant changes          ADDENDUM(4:30 AM): Called by RN for elevated HR. Per telemonitor, Pt with  -140s sustained >120. BP stable. Pt woken up from sleep. Denies any chest pain or palpitations.     - Lopressor 5mg IVP x 1 administered - HR 120s-130s sustained >120. /86. Additional Lopressor 5mg IVP x 1 ordered    - Will continue to monitor on tele  - Discussed with Cardio Dr. James, who agrees with above plan. Called by RN for patient with intermittent episodes of afib with HR 180s, non-sustained. Per tele-monitor, HR currently 90s. Events from this afternoon noted, pt with increased dose of lopressor. Pt seen and examined at bedside. Pt sleeping comfortably in bed. Admits to having a short-term episode of palpitations earlier today while she was eating crackers. Otherwise feels well. Denies any chest pain or shortness of breath.     - No acute interventions needed at this time.   - Continue to monitor on tele  - RN to call for any significant changes          ADDENDUM(4:30 AM): Called by RN for elevated HR. Per telemonitor, Pt with  -140s sustained >120. BP stable. Pt woken up from sleep. Denies any chest pain or palpitations.     - Lopressor 5mg IVP x 1 administered - HR 120s-130s sustained >120. /86.   - Additional Lopressor 5mg IVP x 1 ordered - -130s sustained >120. /80  - Will continue to monitor on tele  - Discussed with Cardio Dr. James, who agrees with above plan. Called by RN for patient with intermittent episodes of afib with HR 180s, non-sustained. Per tele-monitor, HR currently 90s. Events from this afternoon noted, pt with increased dose of lopressor. Pt seen and examined at bedside. Pt sleeping comfortably in bed. Admits to having a short-term episode of palpitations earlier today while she was eating crackers. Otherwise feels well. Denies any chest pain or shortness of breath.     - No acute interventions needed at this time.   - Continue to monitor on tele  - RN to call for any significant changes          ADDENDUM(4:30 AM): Called by RN for elevated HR. Per tele-monitor, Pt with  -140s sustained >120. BP stable. Pt woken up from sleep. Denies any chest pain or palpitations.     - Lopressor 5mg IVP x 2 ordered- HR 120s-130s sustained >120. /76  - Additional diltiazem 5mg IVP x 1 and diltiazem gtt ordered   - Will continue to monitor on tele  - Discussed with Cardio Dr. James, who agrees with above plan.

## 2022-04-06 NOTE — PROGRESS NOTE ADULT - ASSESSMENT
72yo female bib ems with sob, pt states she has missed the last 2 rounds of dialysis and is due today, and has been having worsening sob, no cough, fever, chills, no chest pain no other complaints .Found to have hyperkalemia Admitted  to telemetry unit for monitoring , send 3 sets of cardiac enzymes to rule out acute coronary event, obtain ECHO to evaluate LVEF, cardiology consult  ,continue current management, O2 supply, anticoagulation plan as per cardiology consult Nephrology consult stat requested ,management d/w Dr Perez and  Palliative care consult requested ,to discuss advance directives and complete MOLST  (05 Apr 2022 07:22)        esrd on hd   Excess fluids and waste products will be removed from your blood; your electrolytes will be balanced; your blood pressure will be controlled.      ANEMIA PLAN:  Anemia of chronic disease:  Well controlled by Aranesp  H and H subtherapeutic .  We will continue Aranesp aiming for a HCT of 32-36 %.   We will monitor Iron stores, B12 and RBC folate .      hyperkalemia on low k bath dialysis

## 2022-04-07 LAB
ANION GAP SERPL CALC-SCNC: 14 MMOL/L — SIGNIFICANT CHANGE UP (ref 5–17)
BUN SERPL-MCNC: 62 MG/DL — HIGH (ref 7–23)
CALCIUM SERPL-MCNC: 9.5 MG/DL — SIGNIFICANT CHANGE UP (ref 8.5–10.1)
CHLORIDE SERPL-SCNC: 94 MMOL/L — LOW (ref 96–108)
CO2 SERPL-SCNC: 27 MMOL/L — SIGNIFICANT CHANGE UP (ref 22–31)
CREAT SERPL-MCNC: 7.1 MG/DL — HIGH (ref 0.5–1.3)
EGFR: 6 ML/MIN/1.73M2 — LOW
GLUCOSE SERPL-MCNC: 250 MG/DL — HIGH (ref 70–99)
MAGNESIUM SERPL-MCNC: 2.4 MG/DL — SIGNIFICANT CHANGE UP (ref 1.6–2.6)
PHOSPHATE SERPL-MCNC: 6.2 MG/DL — HIGH (ref 2.5–4.5)
POTASSIUM SERPL-MCNC: 3.9 MMOL/L — SIGNIFICANT CHANGE UP (ref 3.5–5.3)
POTASSIUM SERPL-SCNC: 3.9 MMOL/L — SIGNIFICANT CHANGE UP (ref 3.5–5.3)
SODIUM SERPL-SCNC: 135 MMOL/L — SIGNIFICANT CHANGE UP (ref 135–145)

## 2022-04-07 PROCEDURE — 99232 SBSQ HOSP IP/OBS MODERATE 35: CPT

## 2022-04-07 PROCEDURE — 99233 SBSQ HOSP IP/OBS HIGH 50: CPT

## 2022-04-07 RX ORDER — DILTIAZEM HCL 120 MG
5 CAPSULE, EXT RELEASE 24 HR ORAL
Qty: 125 | Refills: 0 | Status: DISCONTINUED | OUTPATIENT
Start: 2022-04-07 | End: 2022-04-07

## 2022-04-07 RX ORDER — APIXABAN 2.5 MG/1
2.5 TABLET, FILM COATED ORAL EVERY 12 HOURS
Refills: 0 | Status: DISCONTINUED | OUTPATIENT
Start: 2022-04-07 | End: 2022-04-13

## 2022-04-07 RX ORDER — METOPROLOL TARTRATE 50 MG
2.5 TABLET ORAL ONCE
Refills: 0 | Status: DISCONTINUED | OUTPATIENT
Start: 2022-04-07 | End: 2022-04-07

## 2022-04-07 RX ORDER — DILTIAZEM HCL 120 MG
5 CAPSULE, EXT RELEASE 24 HR ORAL ONCE
Refills: 0 | Status: COMPLETED | OUTPATIENT
Start: 2022-04-07 | End: 2022-04-07

## 2022-04-07 RX ORDER — DILTIAZEM HCL 120 MG
60 CAPSULE, EXT RELEASE 24 HR ORAL EVERY 8 HOURS
Refills: 0 | Status: DISCONTINUED | OUTPATIENT
Start: 2022-04-07 | End: 2022-04-13

## 2022-04-07 RX ORDER — METOPROLOL TARTRATE 50 MG
5 TABLET ORAL ONCE
Refills: 0 | Status: COMPLETED | OUTPATIENT
Start: 2022-04-07 | End: 2022-04-07

## 2022-04-07 RX ORDER — DILTIAZEM HCL 120 MG
10 CAPSULE, EXT RELEASE 24 HR ORAL
Qty: 125 | Refills: 0 | Status: DISCONTINUED | OUTPATIENT
Start: 2022-04-07 | End: 2022-04-07

## 2022-04-07 RX ADMIN — Medication 0.1 MILLIGRAM(S): at 09:02

## 2022-04-07 RX ADMIN — Medication 1 TABLET(S): at 04:36

## 2022-04-07 RX ADMIN — Medication 1 TABLET(S): at 12:36

## 2022-04-07 RX ADMIN — Medication 667 MILLIGRAM(S): at 12:36

## 2022-04-07 RX ADMIN — HEPARIN SODIUM 5000 UNIT(S): 5000 INJECTION INTRAVENOUS; SUBCUTANEOUS at 04:35

## 2022-04-07 RX ADMIN — Medication 50 MILLIGRAM(S): at 21:51

## 2022-04-07 RX ADMIN — Medication 3: at 17:13

## 2022-04-07 RX ADMIN — PANTOPRAZOLE SODIUM 40 MILLIGRAM(S): 20 TABLET, DELAYED RELEASE ORAL at 04:35

## 2022-04-07 RX ADMIN — Medication 60 MILLIGRAM(S): at 21:51

## 2022-04-07 RX ADMIN — Medication 3: at 12:36

## 2022-04-07 RX ADMIN — APIXABAN 2.5 MILLIGRAM(S): 2.5 TABLET, FILM COATED ORAL at 21:51

## 2022-04-07 RX ADMIN — INSULIN GLARGINE 7 UNIT(S): 100 INJECTION, SOLUTION SUBCUTANEOUS at 08:56

## 2022-04-07 RX ADMIN — Medication 5 MILLIGRAM(S): at 05:47

## 2022-04-07 RX ADMIN — CLOPIDOGREL BISULFATE 75 MILLIGRAM(S): 75 TABLET, FILM COATED ORAL at 12:36

## 2022-04-07 RX ADMIN — PIPERACILLIN AND TAZOBACTAM 25 GRAM(S): 4; .5 INJECTION, POWDER, LYOPHILIZED, FOR SOLUTION INTRAVENOUS at 17:13

## 2022-04-07 RX ADMIN — Medication 0.1 MILLIGRAM(S): at 21:51

## 2022-04-07 RX ADMIN — Medication 5 MG/HR: at 06:45

## 2022-04-07 RX ADMIN — Medication 1 TABLET(S): at 17:13

## 2022-04-07 RX ADMIN — Medication 5 MG/HR: at 13:00

## 2022-04-07 RX ADMIN — ERYTHROPOIETIN 10000 UNIT(S): 10000 INJECTION, SOLUTION INTRAVENOUS; SUBCUTANEOUS at 21:05

## 2022-04-07 RX ADMIN — Medication 667 MILLIGRAM(S): at 17:13

## 2022-04-07 RX ADMIN — AMLODIPINE BESYLATE 7.5 MILLIGRAM(S): 2.5 TABLET ORAL at 21:51

## 2022-04-07 RX ADMIN — Medication 81 MILLIGRAM(S): at 12:36

## 2022-04-07 RX ADMIN — Medication 3: at 08:55

## 2022-04-07 RX ADMIN — Medication 667 MILLIGRAM(S): at 08:54

## 2022-04-07 RX ADMIN — Medication 100 MILLIGRAM(S): at 17:13

## 2022-04-07 RX ADMIN — Medication 60 MILLIGRAM(S): at 17:13

## 2022-04-07 RX ADMIN — ATORVASTATIN CALCIUM 40 MILLIGRAM(S): 80 TABLET, FILM COATED ORAL at 21:51

## 2022-04-07 RX ADMIN — Medication 5 MILLIGRAM(S): at 04:49

## 2022-04-07 RX ADMIN — PIPERACILLIN AND TAZOBACTAM 25 GRAM(S): 4; .5 INJECTION, POWDER, LYOPHILIZED, FOR SOLUTION INTRAVENOUS at 04:36

## 2022-04-07 RX ADMIN — Medication 5 MILLIGRAM(S): at 06:30

## 2022-04-07 RX ADMIN — APIXABAN 2.5 MILLIGRAM(S): 2.5 TABLET, FILM COATED ORAL at 09:07

## 2022-04-07 NOTE — PROGRESS NOTE ADULT - PROBLEM SELECTOR PLAN 4
Admitted  to telemetry unit for monitoring , send 3 sets of cardiac enzymes to rule out acute coronary event, obtain ECHO to evaluate LVEF, cardiology consult  ,continue current management, O2 supply, anticoagulation plan as per cardiology consult HD as per nephrologist ,case d/w Dr Perez and HD technician ,serial bmp

## 2022-04-07 NOTE — PROGRESS NOTE ADULT - PROBLEM SELECTOR PLAN 3
HD as per nephrologist ,case d/w Dr Perez and HD technician ,serial bmp Admitted  to telemetry unit for monitoring , send 3 sets of cardiac enzymes to rule out acute coronary event, obtain ECHO to evaluate LVEF, cardiology consult  ,continue current management, O2 supply, anticoagulation plan as per cardiology consult Statement Selected

## 2022-04-07 NOTE — PROGRESS NOTE ADULT - PROBLEM SELECTOR PLAN 1
Pt seen and evaluated  - Labs, x-rays reviewed  - No clinical signs of infection noted to the 1st & 2nd digit, gangrenous changes noted. No purulence expressed from Left ankle wound. No heel wound noted at this time.  - All wounds dressed with DSD  - Continue IV abx per ID, zosyn  - Pending MRI to rule out OM to right foot and Left ankle wounds  - Vascular consult appreciated: Pt will require CTA with runoff to further evaluate

## 2022-04-07 NOTE — PROGRESS NOTE ADULT - PROBLEM SELECTOR PLAN 9
Concern for right lower iliac artery extremity inflow disease.  Incompletely visualized bypass graft of leftlower extremity with   antegrade flow .Vascular surgery consult requested Concern for right lower iliac artery extremity inflow disease.  Incompletely visualized bypass graft of left lower extremity with   antegrade flow .Vascular surgery consult requested

## 2022-04-07 NOTE — CONSULT NOTE ADULT - NS ATTEND AMEND GEN_ALL_CORE FT
Patient evaluated at the bedside. US reviewed. Multiple ongoing clinical issues with her. BLE chronic wounds. Recent LLE bypass. I would like her to have a CTA of Ao with runoff to better evaluate PVD. Will follow after test done.

## 2022-04-07 NOTE — PROGRESS NOTE ADULT - SUBJECTIVE AND OBJECTIVE BOX
HPI:  72yo female bib ems with sob, pt states she has missed the last 2 rounds of dialysis and is due today, and has been having worsening sob, no cough, fever, chills, no chest pain no other complaints Found to have hyperkalemia Admitted  to telemetry unit for monitoring. Pt seen this morning for Right 2nd toe infection and left ankle ulcer. Pt AAOX3 in NAD. Pt denies and constitutional symptoms.     REVIEW OF SYSTEMS    PAST MEDICAL & SURGICAL HISTORY:  Diabetes mellitus II    HTN (hypertension)    h/o Anxiety attack    Depression    h/o Myocardial infarct 2007    CAD (coronary artery disease)    h/o Hepatitis A 1969  currently resolved, no symptoms    PAD (peripheral artery disease)    Murmur, cardiac    h/o Smoking  quitted 3/2012    CRF (chronic renal failure), unspecified stage    Dialysis patient    Anemia secondary to renal failure    HTN (hypertension)    coronary stent 2007    s/p Ovarian cyst removal    s/p surgical removal of benign Skin lesion epigastric area        Allergies    latex (Unknown)  No Known Drug Allergies    Intolerances    Social History: former cigarette smoker  no etoh or tobacco reported (05 Apr 2022 07:22)      MEDICATIONS  (STANDING):  amLODIPine   Tablet 5 milliGRAM(s) Oral daily  aspirin enteric coated 81 milliGRAM(s) Oral daily  atorvastatin 40 milliGRAM(s) Oral at bedtime  calcium acetate 667 milliGRAM(s) Oral three times a day with meals  cloNIDine 0.1 milliGRAM(s) Oral at bedtime  clopidogrel Tablet 75 milliGRAM(s) Oral daily  dextrose 5%. 1000 milliLiter(s) (100 mL/Hr) IV Continuous <Continuous>  dextrose 5%. 1000 milliLiter(s) (50 mL/Hr) IV Continuous <Continuous>  dextrose 50% Injectable 25 Gram(s) IV Push once  dextrose 50% Injectable 12.5 Gram(s) IV Push once  dextrose 50% Injectable 25 Gram(s) IV Push once  epoetin fawad-epbx (RETACRIT) Injectable 96333 Unit(s) IV Push <User Schedule>  glucagon  Injectable 1 milliGRAM(s) IntraMuscular once  heparin   Injectable 5000 Unit(s) SubCutaneous every 12 hours  insulin lispro (ADMELOG) corrective regimen sliding scale   SubCutaneous three times a day before meals  insulin regular  human recombinant 10 Unit(s) SubCutaneous Once  metoprolol succinate  milliGRAM(s) Oral daily  multivitamin 1 Tablet(s) Oral daily  pantoprazole    Tablet 40 milliGRAM(s) Oral before breakfast  piperacillin/tazobactam IVPB.. 3.375 Gram(s) IV Intermittent every 12 hours    MEDICATIONS  (PRN):  acetaminophen     Tablet .. 650 milliGRAM(s) Oral every 6 hours PRN Temp greater or equal to 38C (100.4F), Mild Pain (1 - 3)  aluminum hydroxide/magnesium hydroxide/simethicone Suspension 30 milliLiter(s) Oral every 4 hours PRN Dyspepsia  dextrose Oral Gel 15 Gram(s) Oral once PRN Blood Glucose LESS THAN 70 milliGRAM(s)/deciliter  melatonin 3 milliGRAM(s) Oral at bedtime PRN Insomnia  ondansetron Injectable 4 milliGRAM(s) IV Push every 8 hours PRN Nausea and/or Vomiting      PHYSICAL EXAM:  Vascular: +1 pitting edema noted b/l. DP palpable bilaterally, Pt non- palpable bilaterally. Capillary refill 5 seconds, absent to the right 1st and second digits. Digital hair absent bilaterally  Neurological: Light touch sensation intact bilaterally  Musculoskeletal: 5/5 strength in all quadrants bilaterally, AJ & STJ ROM intact  Dermatological: Gangrenous changes noted to the right foot 1st and 2nd digit. 2 x 2 ulceration noted to the Left medial malleolus,, periwound erythema improving, no fluctuance. Probes to bone, no periwound erythema, no malodor, no proximal streaking at this time    ICU Vital Signs Last 24 Hrs  T(C): 36.9 (07 Apr 2022 16:06), Max: 37.2 (07 Apr 2022 04:24)  T(F): 98.5 (07 Apr 2022 16:06), Max: 98.9 (07 Apr 2022 04:24)  HR: 79 (07 Apr 2022 17:10) (78 - 130)  BP: 168/68 (07 Apr 2022 17:10) (102/78 - 168/69)  RR: 18 (07 Apr 2022 17:10) (17 - 18)  SpO2: 95% (07 Apr 2022 17:10) (90% - 96%)    CBC Full  -  ( 06 Apr 2022 05:22 )  WBC Count : 17.66 K/uL  RBC Count : 3.28 M/uL  Hemoglobin : 10.3 g/dL  Hematocrit : 30.7 %  Platelet Count - Automated : 295 K/uL  Mean Cell Volume : 93.6 fl  Mean Cell Hemoglobin : 31.4 pg  Mean Cell Hemoglobin Concentration : 33.6 gm/dL  Auto Neutrophil # : 14.82 K/uL  Auto Lymphocyte # : 0.79 K/uL  Auto Monocyte # : 1.85 K/uL  Auto Eosinophil # : 0.01 K/uL  Auto Basophil # : 0.06 K/uL  Auto Neutrophil % : 83.9 %  Auto Lymphocyte % : 4.5 %  Auto Monocyte % : 10.5 %  Auto Eosinophil % : 0.1 %  Auto Basophil % : 0.3 %    04-07    135  |  94<L>  |  62<H>  ----------------------------<  250<H>  3.9   |  27  |  7.10<H>    Ca    9.5      07 Apr 2022 16:45  Phos  6.2     04-07  Mg     2.4     04-07      ACC: [id number] EXAM: US DPLX LWR EXT ARTS COMPL BI    PROCEDURE DATE: 04/06/2022        INTERPRETATION: CLINICAL INDICATION: Lower extremity wounds. Evaluate for lower extremity arterial pathology.    EXAMINATION: Bilateral lower extremity arterial duplex ultrasound    COMPARISON: None    FINDINGS:    Limited evaluation as noted by technologist due to patient motion.    Bilateral lower extremity atheromatous disease.    Progressively delayed upstroke of right common femoral, superficial femoral, and popliteal arteries noted. The possibility of inflow significant stenosis cannot be excluded. Trifurcation arteries are not visualized due to patient motion. Right dorsalis pedis artery could not be assessed due to overlying bandage material.    Incompletely imaged bypass graft of left lower extremity, which demonstrates antegrade flow. The full extent of the bypass graft is not adequately assessed on this study. The left superficial femoral artery demonstrates minimal flow. Trifurcation arteries are not visualized due to patient motion.    IMPRESSION:    Markedly limited study.    Concern for right lower iliac artery extremity inflow disease.    Incompletely visualized bypass graft of left lower extremity with antegrade flow.    Correlation with CTA with lower extremity runoff is recommended for better characterization of lower extremity arterial vasculature.    Phone message was left with Dr. Larson's service.    --- End of Report ---      ACC: [id number] EXAM: US DPLX LWR EXT VEINS COMPL BI    PROCEDURE DATE: 04/06/2022    INTERPRETATION: CLINICAL INFORMATION: Nonhealing lower extremity wounds    COMPARISON: None available.    TECHNIQUE: Duplex sonography of the BILATERAL LOWER extremity veins with color and spectral Doppler, with and without compression.    FINDINGS:    RIGHT:  Normal compressibility of the RIGHT common femoral, femoral and popliteal veins.  Doppler examination shows normal spontaneous and phasic flow.    The right posterior tibial veins are patent and free of thrombus.    The right peroneal veins were not diagnostically imaged.    LEFT:  Normal compressibility of the LEFT common femoral, femoral and popliteal veins.  Doppler examination shows normal spontaneous and phasic flow.    The left posterior tibial and peroneal veins were not diagnostically imaged.    IMPRESSION:  No evidence of deep venous thrombosis in either lower extremity.      ACC: [id number] EXAM: XR FOOT COMP MIN 3 VIEWS BI    PROCEDURE DATE: 03/30/2022    INTERPRETATION: RIGHT and LEFT foot    CLINICAL INFORMATION: Infection of RIGHT second toe and heel ulcerations.    . TECHNIQUE: RIGHT LEFT AP,lateral and oblique foot views. 6 views    FINDINGS:  LEFT foot:  Stable posterior heel ulceration without subcutaneous air  The bones and joint spaces are intact. No radiographic evidence of osteomyelitis.  No fracture or dislocation.  Diffuse arterial vascular wall calcifications. .    RIGHT foot:  IMPRESSION:  No acute radiographic osseous pathology. No radiographic evidence of osteomyelitis.    If osteomyelitis is considered despite conservative therapy, and soft tissue / bone infection requires further assessment, follow-up MRI recommended.    --- End of Report ---

## 2022-04-07 NOTE — PROGRESS NOTE ADULT - SUBJECTIVE AND OBJECTIVE BOX
[facility]  INFECTIOUS DISEASES - [address], [place] [address]  Tel: [phone]     Fax: [phone]  =======================================================    SHILO KOHLER [id number]    Follow up: No fevers. Placed on Venti mask, although patient says breathing is better and denies any SOB. Has minimal cough. Denies any pain. Had 1 bowel movement yesterday but stool was formed.    Allergies:  latex (Unknown)  No Known Drug Allergies      Antibiotics:  acetaminophen     Tablet .. 650 milliGRAM(s) Oral every 6 hours PRN  aluminum hydroxide/magnesium hydroxide/simethicone Suspension 30 milliLiter(s) Oral every 4 hours PRN  amLODIPine   Tablet 7.5 milliGRAM(s) Oral every 24 hours  apixaban 2.5 milliGRAM(s) Oral every 12 hours  aspirin enteric coated 81 milliGRAM(s) Oral daily  atorvastatin 40 milliGRAM(s) Oral at bedtime  calcium acetate 667 milliGRAM(s) Oral three times a day with meals  cloNIDine 0.1 milliGRAM(s) Oral every 12 hours  clopidogrel Tablet 75 milliGRAM(s) Oral daily  dextrose 5%. 1000 milliLiter(s) IV Continuous <Continuous>  dextrose 5%. 1000 milliLiter(s) IV Continuous <Continuous>  dextrose 50% Injectable 25 Gram(s) IV Push once  dextrose 50% Injectable 12.5 Gram(s) IV Push once  dextrose 50% Injectable 25 Gram(s) IV Push once  dextrose Oral Gel 15 Gram(s) Oral once PRN  diltiazem Infusion 5 mG/Hr IV Continuous <Continuous>  epoetin fawad-epbx (RETACRIT) Injectable 89443 Unit(s) IV Push <User Schedule>  glucagon  Injectable 1 milliGRAM(s) IntraMuscular once  imipramine 50 milliGRAM(s) Oral at bedtime  insulin glargine Injectable (LANTUS) 7 Unit(s) SubCutaneous every morning  insulin lispro (ADMELOG) corrective regimen sliding scale   SubCutaneous at bedtime  insulin lispro (ADMELOG) corrective regimen sliding scale   SubCutaneous three times a day before meals  lactobacillus acidophilus 1 Tablet(s) Oral two times a day  melatonin 3 milliGRAM(s) Oral at bedtime PRN  metoprolol tartrate 100 milliGRAM(s) Oral every 12 hours  Nephro-elvie 1 Tablet(s) Oral daily  ondansetron Injectable 4 milliGRAM(s) IV Push every 8 hours PRN  pantoprazole    Tablet 40 milliGRAM(s) Oral before breakfast  piperacillin/tazobactam IVPB.. 3.375 Gram(s) IV Intermittent every 12 hours  traMADol 50 milliGRAM(s) Oral three times a day PRN       REVIEW OF SYSTEMS:  CONSTITUTIONAL:  (+) fever  HEENT:  No sore throat or runny nose.  CARDIOVASCULAR:  No chest pain  RESPIRATORY: see history  GASTROINTESTINAL:  No nausea, vomiting. no abdominal pain  GENITOURINARY:  No dysuria, frequency or urgency.  MUSCULOSKELETAL:  no back pain  NEUROLOGIC:  No headache, no dizziness  PSYCHIATRIC:  No disorder of thought or mood.       Physical Exam:  ICU Vital Signs Last 24 Hrs  T(C): 36.9 (07 Apr 2022 09:00), Max: 37.3 (06 Apr 2022 17:30)  T(F): 98.5 (07 Apr 2022 09:00), Max: 99.1 (06 Apr 2022 17:30)  HR: 82 (07 Apr 2022 09:00) (82 - 130)  BP: 138/84 (07 Apr 2022 09:00) (102/78 - 174/62)  BP(mean): --  ABP: --  ABP(mean): --  RR: 18 (07 Apr 2022 09:00) (17 - 19)  SpO2: 92% (07 Apr 2022 09:00) (91% - 98%)    GEN: NAD  HEENT: normocephalic and atraumatic.     NECK: Supple.   LUNGS: Clear to auscultation.  HEART: Regular rate and rhythm  ABDOMEN: Soft, nontender, and nondistended.   EXTREMITIES: No knee swelling, no leg edema. RUE AV fistula  NEUROLOGIC: AOx3  PSYCHIATRIC: Appropriate affect .  SKIN: R 1st and 2nd toe ulcers, no drainage, minimal surrounding erythema but not swelling noted  left medial heel ulcer, no drainage, no surrounding swelling or erythema      Labs:  04-06    136  |  96  |  51<H>  ----------------------------<  213<H>  4.8   |  25  |  7.20<H>    Ca    9.6      06 Apr 2022 05:22  Phos  6.2     04-07  Mg     2.4     04-07    TPro  7.4  /  Alb  3.0<L>  /  TBili  0.7  /  DBili  x   /  AST  19  /  ALT  17  /  AlkPhos  90  04-05                          10.3   17.66 )-----------( 295      ( 06 Apr 2022 05:22 )             30.7         LIVER FUNCTIONS - ( 05 Apr 2022 15:19 )  Alb: 3.0 g/dL / Pro: 7.4 g/dL / ALK PHOS: 90 U/L / ALT: 17 U/L / AST: 19 U/L / GGT: x             RECENT CULTURES:  04-05 @ 09:28 .Blood Blood-Venous     No growth to date.        03-30 @ 18:08 .Tissue Other, Left Medial Ankle Klebsiella oxytoca /Raoutella ornithinolytica  Escherichia coli  Staphylococcus aureus    Few Klebsiella oxytoca/Raoutella ornithinolytica  Few Escherichia coli  Moderate Staphylococcus aureus    Few polymorphonuclear leukocytes per low power field  Moderate Gram positive cocci in pairs per oil power field  Rare Gram Negative Rods per oil power field            All imaging and data are reviewed.

## 2022-04-07 NOTE — PROGRESS NOTE ADULT - PROBLEM SELECTOR PLAN 2
- Recommend vascular consult  - Arterial duplex: Bilateral lower extremity atheromatous disease.  Progressively delayed upstroke of right common femoral, superficial femoral, and popliteal arteries noted. The possibility of inflow significant stenosis cannot be excluded. Trifurcation arteries are not visualized due to patient motion. Right dorsalis pedis artery could not be assessed due to overlying bandage material.  - Incompletely imaged bypass graft of left lower extremity, which demonstrates antegrade flow. The full extent of the bypass graft is not adequately assessed on this study. The left superficial femoral artery demonstrates minimal flow. Trifurcation arteries are not visualized due to patient motion.

## 2022-04-07 NOTE — PROGRESS NOTE ADULT - ASSESSMENT
72yo female bib ems with sob, pt states she has missed the last 2 rounds of dialysis and is due today, and has been having worsening sob    remains with hypoxemia  ID following  on ABX  remains with atrial arrhythmia -   ct chest report - poss pna - pulm edema - nodules - mucus plugging -       pt wishes to have HD  renal - dr alvarez  labs and imaging reviewed  on supplemental o2 support  monitor labs - lytes  HD as per Renal team  GOC documented - Spouse Geoffrey - HCP  pt is full code - wants an attempt at Resuscitation - pt does not want to linger on machines

## 2022-04-07 NOTE — CONSULT NOTE ADULT - SUBJECTIVE AND OBJECTIVE BOX
Asked by Medicine to see patient with Left lower extremity ulceration and Right 1st great toe and 2nd toe gangrene. Pt admitted for worsening SOB and hyperkalemia after missing HD. Pt presently on cardizem drip for Rapid A-fib. Pt seen by podiatry, waiting for MRI to r/o OM.    70yo female bib ems with sob, pt states she has missed the last 2 rounds of dialysis and is due today, and has been having worsening sob, no cough, fever, chills, no chest pain no other complaints .Found to have hyperkalemia Admitted  to telemetry unit for monitoring , send 3 sets of cardiac enzymes to rule out acute coronary event, obtain ECHO to evaluate LVEF, cardiology consult  ,continue current management, O2 supply, anticoagulation plan as per cardiology consult Nephrology consult stat requested ,management d/w Dr Perez and  Palliative care consult requested ,to discuss advance directives and complete MOLST  (05 Apr 2022 07:22)   complaint of Patient is a 71y old  Female who presents with a chief complaint of shortness of breath (07 Apr 2022 13:05)      PAST MEDICAL & SURGICAL HISTORY:  Diabetes mellitus II    HTN (hypertension)    h/o Anxiety attack    Depression    h/o Myocardial infarct 2007    CAD (coronary artery disease)    h/o Hepatitis A 1969  currently resolved, no symptoms    PAD (peripheral artery disease)    Murmur, cardiac    h/o Smoking  quitted 3/2012    CRF (chronic renal failure), unspecified stage    Dialysis patient    Anemia secondary to renal failure    HTN (hypertension)    coronary stent 2007    s/p Ovarian cyst removal    s/p surgical removal of benign Skin lesion epigastric area    FAMILY HISTORY:    Tobacco- Quit 4 years ago- 2PPD x 40 years  Etoh-   Drugs    latex (Unknown)  No Known Drug Allergies      Home Medications:  amLODIPine 5 mg oral tablet: 1.5 tab(s) orally once a day  home (05 Apr 2022 18:40)  atorvastatin 40 mg oral tablet: 1 tab(s) orally once a day (at bedtime)  home/hosp (05 Apr 2022 18:40)  cloNIDine 0.1 mg oral tablet: 1 tab(s) orally 2 times a day (05 Apr 2022 18:40)  clopidogrel 75 mg oral tablet: 1 tab(s) orally once a day  home/hosp (05 Apr 2022 18:40)  Emla 2.5%-2.5% topical cream: Apply topically to affected area once on Dialysis days (05 Apr 2022 18:40)  Fiasp 100 units/mL injectable solution: 3-5 unit(s) injectable 3 times a day (with meals) as per sliding scale (05 Apr 2022 18:40)  imipramine 50 mg oral tablet: 1 tab(s) orally once a day  home/hosp (05 Apr 2022 18:40)  metoprolol succinate 50 mg oral tablet, extended release: 1 tab(s) orally once a day (05 Apr 2022 18:40)  Protonix 40 mg oral delayed release tablet: 1 tab(s) orally once a day  hosp (05 Apr 2022 18:40)  Jennifer-Elvie oral tablet: 1 tab(s) orally once a day (05 Apr 2022 18:40)  Tresiba FlexTouch 100 units/mL subcutaneous solution: 15 unit(s) subcutaneous once a day (at bedtime) (05 Apr 2022 18:40)  zolpidem 10 mg oral tablet: 1 tab(s) orally once a day (at bedtime) (05 Apr 2022 18:40)      MEDICATIONS  (STANDING):  amLODIPine   Tablet 7.5 milliGRAM(s) Oral every 24 hours  apixaban 2.5 milliGRAM(s) Oral every 12 hours  aspirin enteric coated 81 milliGRAM(s) Oral daily  atorvastatin 40 milliGRAM(s) Oral at bedtime  calcium acetate 667 milliGRAM(s) Oral three times a day with meals  cloNIDine 0.1 milliGRAM(s) Oral every 12 hours  clopidogrel Tablet 75 milliGRAM(s) Oral daily  dextrose 5%. 1000 milliLiter(s) (100 mL/Hr) IV Continuous <Continuous>  dextrose 5%. 1000 milliLiter(s) (50 mL/Hr) IV Continuous <Continuous>  dextrose 50% Injectable 25 Gram(s) IV Push once  dextrose 50% Injectable 12.5 Gram(s) IV Push once  dextrose 50% Injectable 25 Gram(s) IV Push once  diltiazem Infusion 5 mG/Hr (5 mL/Hr) IV Continuous <Continuous>  epoetin fawad-epbx (RETACRIT) Injectable 09800 Unit(s) IV Push <User Schedule>  glucagon  Injectable 1 milliGRAM(s) IntraMuscular once  imipramine 50 milliGRAM(s) Oral at bedtime  insulin glargine Injectable (LANTUS) 7 Unit(s) SubCutaneous every morning  insulin lispro (ADMELOG) corrective regimen sliding scale   SubCutaneous at bedtime  insulin lispro (ADMELOG) corrective regimen sliding scale   SubCutaneous three times a day before meals  lactobacillus acidophilus 1 Tablet(s) Oral two times a day  metoprolol tartrate 100 milliGRAM(s) Oral every 12 hours  Nephro-elvie 1 Tablet(s) Oral daily  pantoprazole    Tablet 40 milliGRAM(s) Oral before breakfast  piperacillin/tazobactam IVPB.. 3.375 Gram(s) IV Intermittent every 12 hours    MEDICATIONS  (PRN):  acetaminophen     Tablet .. 650 milliGRAM(s) Oral every 6 hours PRN Temp greater or equal to 38C (100.4F), Mild Pain (1 - 3)  aluminum hydroxide/magnesium hydroxide/simethicone Suspension 30 milliLiter(s) Oral every 4 hours PRN Dyspepsia  dextrose Oral Gel 15 Gram(s) Oral once PRN Blood Glucose LESS THAN 70 milliGRAM(s)/deciliter  melatonin 3 milliGRAM(s) Oral at bedtime PRN Insomnia  ondansetron Injectable 4 milliGRAM(s) IV Push every 8 hours PRN Nausea and/or Vomiting  traMADol 50 milliGRAM(s) Oral three times a day PRN Moderate Pain (4 - 6)      REVIEW OF SYSTEM:  CONSTITUTIONAL: No weakness, fevers or chills  EYES/ENT: No visual changes;  No vertigo or throat pain.  NECK: No pain or stiffness  RESPIRATORY: +SOB  CARDIOVASCULAR: No chest pain or palpitations  GASTROINTESTINAL: No abdominal or epigastric pain. No nausea, vomiting, or hematemesis; No diarrhea or constipation. No melena or hematochezia.  GENITOURINARY: No dysuria, frequency or hematuria  NEUROLOGICAL: +numbness to bilateral lower extremities due to neuropathy  MUSCULOSKELETAL: See HPI  SKIN: No itching, burning, rashes, or lesions   All other review of systems is negative unless indicated above.    Allergic/Immunologic:	    Vital Signs Last 24 Hrs  T(C): 36.9 (07 Apr 2022 16:06), Max: 37.3 (06 Apr 2022 17:30)  T(F): 98.5 (07 Apr 2022 16:06), Max: 99.1 (06 Apr 2022 17:30)  HR: 78 (07 Apr 2022 16:06) (78 - 130)  BP: 168/69 (07 Apr 2022 16:06) (102/78 - 168/69)  BP(mean): --  RR: 17 (07 Apr 2022 16:06) (17 - 18)  SpO2: 90% (07 Apr 2022 16:06) (90% - 96%)    I&O's Detail    06 Apr 2022 07:01  -  07 Apr 2022 07:00  --------------------------------------------------------  IN:  Total IN: 0 mL    OUT:    Other (mL): 200 mL  Total OUT: 200 mL    Total NET: -200 mL    PHYSICAL EXAM:  GENERAL: NAD, Cachetic female  HEAD:  Atraumatic, Normocephalic  NECK: Supple, No JVD  HEART: Regular rate and rhythm; No murmurs, rubs, or gallops  RESPIRATORY: CTA B/L, No W/R/R  ABDOMEN: Soft, Nontender, Nondistended; Bowel sounds present  NEUROLOGY: A&Ox3, nonfocal,    EXTREMITIES: Lowers extremities    LLE- (+) 2 cm circular ulceration to medial ankle region  Decreased sensation to light touch  5/5-TA/GS/EHL  Biphasic DP and PT pulse on doppler  Well healed incisions from groin to distal knee region    RLE-Decreased sensation to light touch  5/5-TA/GS/EHL  Biphasic DP and PT pulse on doppler, non-palpable pulses  2+femoral pulse  (+) gangrene 1st great toe and tip of 2nd toe.                                10.3   17.66 )-----------( 295      ( 06 Apr 2022 05:22 )             30.7       04-06    136  |  96  |  51<H>  ----------------------------<  213<H>  4.8   |  25  |  7.20<H>    Ca    9.6      06 Apr 2022 05:22  Phos  6.2     04-07  Mg     2.4     04-07      CAPILLARY BLOOD GLUCOSE      POCT Blood Glucose.: 255 mg/dL (07 Apr 2022 12:33)  POCT Blood Glucose.: 259 mg/dL (07 Apr 2022 08:38)  POCT Blood Glucose.: 397 mg/dL (06 Apr 2022 21:24)  POCT Blood Glucose.: 212 mg/dL (06 Apr 2022 17:00)    Radiology Findings:     < from: US Duplex Arterial Lower Ext Compl, Bilateral (04.06.22 @ 14:05) >    ACC: [id number] EXAM:  US DPLX LWR EXT ARTS COMPL BI                          PROCEDURE DATE:  04/06/2022          INTERPRETATION:  CLINICAL INDICATION: Lower extremity wounds. Evaluate   for lower extremity arterial pathology.    EXAMINATION: Bilateral lower extremity arterial duplex ultrasound    COMPARISON: None    FINDINGS:    Limited evaluation as noted by technologist due to patient motion.    Bilateral lower extremity atheromatous disease.    Progressively delayed upstroke of right common femoral, superficial   femoral, and popliteal arteries noted. The possibility of inflow   significant stenosis cannot be excluded. Trifurcation arteries are not   visualized due to patient motion. Right dorsalis pedis artery could not   be assessed due to overlying bandage material.    Incompletely imaged bypass graft of left left lower extremity, which   demonstrates antegrade flow. The full extent of the bypass graft is not   adequately assessed on this study. The left superficial femoral artery  demonstrates minimal flow. Trifurcation arteries are not visualized due   to patient motion.    IMPRESSION:    Markedly limited study.    Concern for right lower iliac artery extremity inflow disease.    Incompletely visualized bypass graft of leftlower extremity with   antegrade flow.    Correlation with CTA with lower extremity runoff is recommended for   better characterization of lower extremity arterial vasculature.    Phone message was left with Dr. Larson's service.    --- End of Report ---      HALEY SAUCEDO M.D., ATTENDING RADIOLOGIST  This document has been electronically signed. Apr 6 2022  4:15PM    < end of copied text >    71 Y.O. F bib ems with sob, pt states she has missed the last 2 rounds of dialysis and is due today, and has been having worsening sob, no cough, fever, chills, no chest pain no other complaints.    -Pt with Right lower iliac artery inflow disease, incompletely visualized bypass graft of Left lower extremity with antegrade flow  -Pt will require CTA with runoff to further evaluate   -Will discuss with Dr. Smalls

## 2022-04-07 NOTE — PROGRESS NOTE ADULT - ASSESSMENT
esrd- admitted with sob- missed dialysis x2  hyperkalemia- improved  chf  k 4.8  ashd- s/p coronary stent  s/p cabg  hypertension  dyslipidemia  pvd- s/p bypass left leg  dialysis as per renal asap- discussed with pcp and renal  spoke to  4/5  to have hd again  had paf - started on iv cardizem  started on eliquis for paf risks and benefits discussed with pt and

## 2022-04-07 NOTE — PROGRESS NOTE ADULT - ASSESSMENT
70yo female bib ems with sob, pt states she has missed the last 2 rounds of dialysis and is due today, and has been having worsening sob, no cough, fever, chills, no chest pain no other complaints .Found to have hyperkalemia Admitted  to telemetry unit for monitoring , send 3 sets of cardiac enzymes to rule out acute coronary event, obtain ECHO to evaluate LVEF, cardiology consult  ,continue current management, O2 supply, anticoagulation plan as per cardiology consult Nephrology consult stat requested ,management d/w Dr Perez and  Palliative care consult requested ,to discuss advance directives and complete MOLST  (05 Apr 2022 07:22)        esrd on hd   Excess fluids and waste products will be removed from your blood; your electrolytes will be balanced; your blood pressure will be controlled.      ANEMIA PLAN:  Anemia of chronic disease:  Well controlled by epo  H and H subtherapeutic .  We will continue epo aiming for a HCT of 32-36 %.   We will monitor Iron stores, B12 and RBC folate .      hyperkalemia on low k bath dialysis

## 2022-04-07 NOTE — PROGRESS NOTE ADULT - SUBJECTIVE AND OBJECTIVE BOX
Date/Time Patient Seen:  		  Referring MD:   Data Reviewed	       Patient is a 71y old  Female who presents with a chief complaint of shortness of breath (06 Apr 2022 16:52)      Subjective/HPI     PAST MEDICAL & SURGICAL HISTORY:  Diabetes mellitus II    HTN (hypertension)    h/o Anxiety attack    Depression    h/o Myocardial infarct 2007    CAD (coronary artery disease)    CAD (coronary artery disease)    h/o Hepatitis A 1969  currently resolved, no symptoms    PAD (peripheral artery disease)    Murmur, cardiac    h/o Smoking  quitted 3/2012    CRF (chronic renal failure), unspecified stage    Dialysis patient    Anemia secondary to renal failure    HTN (hypertension)    coronary stent 2007    s/p Ovarian cyst removal    s/p surgical removal of benign Skin lesion epigastric area          Medication list         MEDICATIONS  (STANDING):  amLODIPine   Tablet 7.5 milliGRAM(s) Oral every 24 hours  aspirin enteric coated 81 milliGRAM(s) Oral daily  atorvastatin 40 milliGRAM(s) Oral at bedtime  calcium acetate 667 milliGRAM(s) Oral three times a day with meals  cloNIDine 0.1 milliGRAM(s) Oral every 12 hours  clopidogrel Tablet 75 milliGRAM(s) Oral daily  dextrose 5%. 1000 milliLiter(s) (100 mL/Hr) IV Continuous <Continuous>  dextrose 5%. 1000 milliLiter(s) (50 mL/Hr) IV Continuous <Continuous>  dextrose 50% Injectable 25 Gram(s) IV Push once  dextrose 50% Injectable 12.5 Gram(s) IV Push once  dextrose 50% Injectable 25 Gram(s) IV Push once  diltiazem Infusion 5 mG/Hr (5 mL/Hr) IV Continuous <Continuous>  diltiazem Injectable 5 milliGRAM(s) IV Push once  epoetin fawad-epbx (RETACRIT) Injectable 24224 Unit(s) IV Push <User Schedule>  glucagon  Injectable 1 milliGRAM(s) IntraMuscular once  heparin   Injectable 5000 Unit(s) SubCutaneous every 12 hours  imipramine 50 milliGRAM(s) Oral at bedtime  insulin glargine Injectable (LANTUS) 7 Unit(s) SubCutaneous every morning  insulin lispro (ADMELOG) corrective regimen sliding scale   SubCutaneous at bedtime  insulin lispro (ADMELOG) corrective regimen sliding scale   SubCutaneous three times a day before meals  lactobacillus acidophilus 1 Tablet(s) Oral two times a day  metoprolol tartrate 100 milliGRAM(s) Oral every 12 hours  Nephro-elvie 1 Tablet(s) Oral daily  pantoprazole    Tablet 40 milliGRAM(s) Oral before breakfast  piperacillin/tazobactam IVPB.. 3.375 Gram(s) IV Intermittent every 12 hours    MEDICATIONS  (PRN):  acetaminophen     Tablet .. 650 milliGRAM(s) Oral every 6 hours PRN Temp greater or equal to 38C (100.4F), Mild Pain (1 - 3)  aluminum hydroxide/magnesium hydroxide/simethicone Suspension 30 milliLiter(s) Oral every 4 hours PRN Dyspepsia  dextrose Oral Gel 15 Gram(s) Oral once PRN Blood Glucose LESS THAN 70 milliGRAM(s)/deciliter  melatonin 3 milliGRAM(s) Oral at bedtime PRN Insomnia  ondansetron Injectable 4 milliGRAM(s) IV Push every 8 hours PRN Nausea and/or Vomiting  traMADol 50 milliGRAM(s) Oral three times a day PRN Moderate Pain (4 - 6)         Vitals log        ICU Vital Signs Last 24 Hrs  T(C): 37.1 (07 Apr 2022 06:04), Max: 37.3 (06 Apr 2022 17:30)  T(F): 98.7 (07 Apr 2022 06:04), Max: 99.1 (06 Apr 2022 17:30)  HR: 129 (07 Apr 2022 06:04) (86 - 130)  BP: 102/78 (07 Apr 2022 06:04) (102/78 - 174/62)  BP(mean): --  ABP: --  ABP(mean): --  RR: 18 (07 Apr 2022 06:04) (17 - 19)  SpO2: 96% (07 Apr 2022 06:04) (91% - 98%)           Input and Output:  I&O's Detail    05 Apr 2022 07:01  -  06 Apr 2022 07:00  --------------------------------------------------------  IN:  Total IN: 0 mL    OUT:    Other (mL): 1900 mL  Total OUT: 1900 mL    Total NET: -1900 mL      06 Apr 2022 07:01  -  07 Apr 2022 06:25  --------------------------------------------------------  IN:  Total IN: 0 mL    OUT:    Other (mL): 200 mL  Total OUT: 200 mL    Total NET: -200 mL          Lab Data                        10.3   17.66 )-----------( 295      ( 06 Apr 2022 05:22 )             30.7     04-06    136  |  96  |  51<H>  ----------------------------<  213<H>  4.8   |  25  |  7.20<H>    Ca    9.6      06 Apr 2022 05:22  Phos  4.9     04-05  Mg     2.3     04-05    TPro  7.4  /  Alb  3.0<L>  /  TBili  0.7  /  DBili  x   /  AST  19  /  ALT  17  /  AlkPhos  90  04-05            Review of Systems	      Objective     Physical Examination    heart s1s2  lung dec BS  abd soft      Pertinent Lab findings & Imaging      Dexter:  NO   Adequate UO     I&O's Detail    05 Apr 2022 07:01  -  06 Apr 2022 07:00  --------------------------------------------------------  IN:  Total IN: 0 mL    OUT:    Other (mL): 1900 mL  Total OUT: 1900 mL    Total NET: -1900 mL      06 Apr 2022 07:01  -  07 Apr 2022 06:25  --------------------------------------------------------  IN:  Total IN: 0 mL    OUT:    Other (mL): 200 mL  Total OUT: 200 mL    Total NET: -200 mL               Discussed with:     Cultures:	        Radiology

## 2022-04-07 NOTE — PROGRESS NOTE ADULT - ASSESSMENT
72yo female with hx of ESRD on HD, who presented with SOB after missing dialysis x 2. Found to have leukocytosis, likely multifactorial in the setting of fluid overload and possible pneumonia. Fever resolved and blood cultures remain no growth. No diarrhea reported here.    She is also being evaluated for right foot osteomyelitis. No drainage or surrounding cellulitis noted from LE ulcers. Recent foot xray from 3/30/22 showed no evidence of osteomyelitis, and recent tissue cultures from 3/30/22 grew klebsiella oxytoca/raoutella oxytoca, E. coli, MSSA.    1. Leukocytosis, possible pneumonia  -continue Zosyn  -follow blood cultures to completion  -recommend MRSA nasal PCR, urine legionella and pneumococcal antigens  -can discontinue contact isolation  -monitor WBC    2. Right foot ulcers  -wound care  -follow up Right foot MRI, L ankle MRI     Lupe Tsai MD  Division of Infectious Diseases   Cell [phone] between 8am and 6pm   After 6pm and weekends please call ID service at [phone].

## 2022-04-07 NOTE — PROGRESS NOTE ADULT - SUBJECTIVE AND OBJECTIVE BOX
PROGRESS NOTE  Patient is a 71y old  Female who presents with a chief complaint of shortness of breath (07 Apr 2022 08:55)  Chart and available morning labs /imaging are reviewed electronically , urgent issues addressed . More information  is being added upon completion of rounds , when more information is collected and management discussed with consultants , medical staff and social service/case management on the floor     OVERNIGHT      HPI:  72yo female bib ems with sob, pt states she has missed the last 2 rounds of dialysis and is due today, and has been having worsening sob, no cough, fever, chills, no chest pain no other complaints .Found to have hyperkalemia Admitted  to telemetry unit for monitoring , send 3 sets of cardiac enzymes to rule out acute coronary event, obtain ECHO to evaluate LVEF, cardiology consult  ,continue current management, O2 supply, anticoagulation plan as per cardiology consult Nephrology consult stat requested ,management d/w Dr Perez and  Palliative care consult requested ,to discuss advance directives and complete MOLST  (05 Apr 2022 07:22)    PAST MEDICAL & SURGICAL HISTORY:  Diabetes mellitus II    HTN (hypertension)    h/o Anxiety attack    Depression    h/o Myocardial infarct 2007    CAD (coronary artery disease)    h/o Hepatitis A 1969  currently resolved, no symptoms    PAD (peripheral artery disease)    Murmur, cardiac    h/o Smoking  quitted 3/2012    CRF (chronic renal failure), unspecified stage    Dialysis patient    Anemia secondary to renal failure    HTN (hypertension)    coronary stent 2007    s/p Ovarian cyst removal    s/p surgical removal of benign Skin lesion epigastric area        MEDICATIONS  (STANDING):  amLODIPine   Tablet 7.5 milliGRAM(s) Oral every 24 hours  apixaban 2.5 milliGRAM(s) Oral every 12 hours  aspirin enteric coated 81 milliGRAM(s) Oral daily  atorvastatin 40 milliGRAM(s) Oral at bedtime  calcium acetate 667 milliGRAM(s) Oral three times a day with meals  cloNIDine 0.1 milliGRAM(s) Oral every 12 hours  clopidogrel Tablet 75 milliGRAM(s) Oral daily  dextrose 5%. 1000 milliLiter(s) (100 mL/Hr) IV Continuous <Continuous>  dextrose 5%. 1000 milliLiter(s) (50 mL/Hr) IV Continuous <Continuous>  dextrose 50% Injectable 25 Gram(s) IV Push once  dextrose 50% Injectable 12.5 Gram(s) IV Push once  dextrose 50% Injectable 25 Gram(s) IV Push once  diltiazem Infusion 10 mG/Hr (10 mL/Hr) IV Continuous <Continuous>  epoetin fawad-epbx (RETACRIT) Injectable 88084 Unit(s) IV Push <User Schedule>  glucagon  Injectable 1 milliGRAM(s) IntraMuscular once  imipramine 50 milliGRAM(s) Oral at bedtime  insulin glargine Injectable (LANTUS) 7 Unit(s) SubCutaneous every morning  insulin lispro (ADMELOG) corrective regimen sliding scale   SubCutaneous at bedtime  insulin lispro (ADMELOG) corrective regimen sliding scale   SubCutaneous three times a day before meals  lactobacillus acidophilus 1 Tablet(s) Oral two times a day  metoprolol tartrate 100 milliGRAM(s) Oral every 12 hours  Nephro-elvie 1 Tablet(s) Oral daily  pantoprazole    Tablet 40 milliGRAM(s) Oral before breakfast  piperacillin/tazobactam IVPB.. 3.375 Gram(s) IV Intermittent every 12 hours    MEDICATIONS  (PRN):  acetaminophen     Tablet .. 650 milliGRAM(s) Oral every 6 hours PRN Temp greater or equal to 38C (100.4F), Mild Pain (1 - 3)  aluminum hydroxide/magnesium hydroxide/simethicone Suspension 30 milliLiter(s) Oral every 4 hours PRN Dyspepsia  dextrose Oral Gel 15 Gram(s) Oral once PRN Blood Glucose LESS THAN 70 milliGRAM(s)/deciliter  melatonin 3 milliGRAM(s) Oral at bedtime PRN Insomnia  ondansetron Injectable 4 milliGRAM(s) IV Push every 8 hours PRN Nausea and/or Vomiting  traMADol 50 milliGRAM(s) Oral three times a day PRN Moderate Pain (4 - 6)      OBJECTIVE    T(C): 36.9 (04-07-22 @ 09:00), Max: 37.3 (04-06-22 @ 17:30)  HR: 82 (04-07-22 @ 09:00) (82 - 130)  BP: 138/84 (04-07-22 @ 09:00) (102/78 - 174/62)  RR: 18 (04-07-22 @ 09:00) (17 - 19)  SpO2: 92% (04-07-22 @ 09:00) (91% - 98%)  Wt(kg): --  I&O's Summary    06 Apr 2022 07:01  -  07 Apr 2022 07:00  --------------------------------------------------------  IN: 0 mL / OUT: 200 mL / NET: -200 mL          REVIEW OF SYSTEMS:  CONSTITUTIONAL: No fever, weight loss, or fatigue  EYES: No eye pain, visual disturbances, or discharge  ENMT:   No sinus or throat pain  NECK: No pain or stiffness  RESPIRATORY: No cough, wheezing, chills or hemoptysis; No shortness of breath  CARDIOVASCULAR: No chest pain, palpitations, dizziness, or leg swelling  GASTROINTESTINAL: No abdominal pain. No nausea, vomiting; No diarrhea or constipation. No melena or hematochezia.  GENITOURINARY: No dysuria, frequency, hematuria, or incontinence  NEUROLOGICAL: No headaches, memory loss, loss of strength, numbness, or tremors  SKIN: No itching, burning, rashes, or lesions   MUSCULOSKELETAL: No joint pain or swelling; No muscle, back, or extremity pain    PHYSICAL EXAM:  Appearance: NAD. VS past 24 hrs -as above   HEENT:   Moist oral mucosa. Conjunctiva clear b/l.   Neck : supple  Respiratory: Lungs CTAB.  Gastrointestinal:  Soft, nontender. No rebound. No rigidity. BS present	  Cardiovascular: RRR ,S1S2 present  Neurologic: Non-focal. Moving all extremities.  Extremities: No edema. No erythema. No calf tenderness.  Skin: No rashes, No ecchymoses, No cyanosis.	  wounds ,skin lesions-See skin assesment flow sheet   LABS:                        10.3   17.66 )-----------( 295      ( 06 Apr 2022 05:22 )             30.7     04-06    136  |  96  |  51<H>  ----------------------------<  213<H>  4.8   |  25  |  7.20<H>    Ca    9.6      06 Apr 2022 05:22  Phos  6.2     04-07  Mg     2.4     04-07    TPro  7.4  /  Alb  3.0<L>  /  TBili  0.7  /  DBili  x   /  AST  19  /  ALT  17  /  AlkPhos  90  04-05    CAPILLARY BLOOD GLUCOSE      POCT Blood Glucose.: 259 mg/dL (07 Apr 2022 08:38)  POCT Blood Glucose.: 397 mg/dL (06 Apr 2022 21:24)  POCT Blood Glucose.: 212 mg/dL (06 Apr 2022 17:00)  POCT Blood Glucose.: 267 mg/dL (06 Apr 2022 14:58)          Culture - Blood (collected 05 Apr 2022 09:28)  Source: .Blood Blood-Venous  Preliminary Report (06 Apr 2022 10:01):    No growth to date.    Culture - Blood (collected 05 Apr 2022 09:28)  Source: .Blood Blood-Venous  Preliminary Report (06 Apr 2022 10:01):    No growth to date.    Culture - Tissue with Gram Stain (collected 30 Mar 2022 18:08)  Source: .Tissue Other, Left Medial Ankle  Gram Stain (30 Mar 2022 20:57):    Few polymorphonuclear leukocytes per low power field    Moderate Gram positive cocci in pairs per oil power field    Rare Gram Negative Rods per oil power field  Preliminary Report (31 Mar 2022 18:16):    Few Klebsiella oxytoca/Raoutella ornithinolytica    Few Escherichia coli    Moderate Staphylococcus aureus  Organism: Klebsiella oxytoca /Raoutella ornithinolytica  Escherichia coli  Staphylococcus aureus (01 Apr 2022 16:04)  Organism: Staphylococcus aureus (01 Apr 2022 16:04)  Organism: Escherichia coli (01 Apr 2022 16:04)  Organism: Klebsiella oxytoca /Raoutella ornithinolytica (01 Apr 2022 16:04)      RADIOLOGY & ADDITIONAL TESTS:   reviewed elctronically  ASSESSMENT/PLAN: 	     PROGRESS NOTE  Patient is a 71y old  Female who presents with a chief complaint of shortness of breath (07 Apr 2022 08:55)  Chart and available morning labs /imaging are reviewed electronically , urgent issues addressed . More information  is being added upon completion of rounds , when more information is collected and management discussed with consultants , medical staff and social service/case management on the floor   OVERNIGHT  No new issues reported by medical staff . All above noted Patient is resting in a bed comfortably .Confused ,poor mentation .No distress noted   Feels better On Cardizem drip ,case d/w Dr James .Isolation discontinued by ID since diarrhea resolved and not able to collect specimen for c.diff  HPI:  72yo female bib ems with sob, pt states she has missed the last 2 rounds of dialysis and is due today, and has been having worsening sob, no cough, fever, chills, no chest pain no other complaints .Found to have hyperkalemia Admitted  to telemetry unit for monitoring , send 3 sets of cardiac enzymes to rule out acute coronary event, obtain ECHO to evaluate LVEF, cardiology consult  ,continue current management, O2 supply, anticoagulation plan as per cardiology consult Nephrology consult stat requested ,management d/w Dr Perez and  Palliative care consult requested ,to discuss advance directives and complete MOLST  (05 Apr 2022 07:22)  PAST MEDICAL & SURGICAL HISTORY:  Diabetes mellitus II  HTN (hypertension)  h/o Anxiety attack  Depression  h/o Myocardial infarct 2007  CAD (coronary artery disease)  h/o Hepatitis A 1969  currently resolved, no symptoms  PAD (peripheral artery disease)  Murmur, cardiac  h/o Smoking  quitted 3/2012  CRF (chronic renal failure), unspecified stage  Dialysis patient  Anemia secondary to renal failure    HTN (hypertension)    coronary stent 2007    s/p Ovarian cyst removal    s/p surgical removal of benign Skin lesion epigastric area        MEDICATIONS  (STANDING):  amLODIPine   Tablet 7.5 milliGRAM(s) Oral every 24 hours  apixaban 2.5 milliGRAM(s) Oral every 12 hours  aspirin enteric coated 81 milliGRAM(s) Oral daily  atorvastatin 40 milliGRAM(s) Oral at bedtime  calcium acetate 667 milliGRAM(s) Oral three times a day with meals  cloNIDine 0.1 milliGRAM(s) Oral every 12 hours  clopidogrel Tablet 75 milliGRAM(s) Oral daily  dextrose 5%. 1000 milliLiter(s) (100 mL/Hr) IV Continuous <Continuous>  dextrose 5%. 1000 milliLiter(s) (50 mL/Hr) IV Continuous <Continuous>  dextrose 50% Injectable 25 Gram(s) IV Push once  dextrose 50% Injectable 12.5 Gram(s) IV Push once  dextrose 50% Injectable 25 Gram(s) IV Push once  diltiazem Infusion 10 mG/Hr (10 mL/Hr) IV Continuous <Continuous>  epoetin fawad-epbx (RETACRIT) Injectable 27792 Unit(s) IV Push <User Schedule>  glucagon  Injectable 1 milliGRAM(s) IntraMuscular once  imipramine 50 milliGRAM(s) Oral at bedtime  insulin glargine Injectable (LANTUS) 7 Unit(s) SubCutaneous every morning  insulin lispro (ADMELOG) corrective regimen sliding scale   SubCutaneous at bedtime  insulin lispro (ADMELOG) corrective regimen sliding scale   SubCutaneous three times a day before meals  lactobacillus acidophilus 1 Tablet(s) Oral two times a day  metoprolol tartrate 100 milliGRAM(s) Oral every 12 hours  Nephro-elvie 1 Tablet(s) Oral daily  pantoprazole    Tablet 40 milliGRAM(s) Oral before breakfast  piperacillin/tazobactam IVPB.. 3.375 Gram(s) IV Intermittent every 12 hours    MEDICATIONS  (PRN):  acetaminophen     Tablet .. 650 milliGRAM(s) Oral every 6 hours PRN Temp greater or equal to 38C (100.4F), Mild Pain (1 - 3)  aluminum hydroxide/magnesium hydroxide/simethicone Suspension 30 milliLiter(s) Oral every 4 hours PRN Dyspepsia  dextrose Oral Gel 15 Gram(s) Oral once PRN Blood Glucose LESS THAN 70 milliGRAM(s)/deciliter  melatonin 3 milliGRAM(s) Oral at bedtime PRN Insomnia  ondansetron Injectable 4 milliGRAM(s) IV Push every 8 hours PRN Nausea and/or Vomiting  traMADol 50 milliGRAM(s) Oral three times a day PRN Moderate Pain (4 - 6)      OBJECTIVE    T(C): 36.9 (04-07-22 @ 09:00), Max: 37.3 (04-06-22 @ 17:30)  HR: 82 (04-07-22 @ 09:00) (82 - 130)  BP: 138/84 (04-07-22 @ 09:00) (102/78 - 174/62)  RR: 18 (04-07-22 @ 09:00) (17 - 19)  SpO2: 92% (04-07-22 @ 09:00) (91% - 98%)  Wt(kg): --  I&O's Summary    06 Apr 2022 07:01  -  07 Apr 2022 07:00  --------------------------------------------------------  IN: 0 mL / OUT: 200 mL / NET: -200 mL          REVIEW OF SYSTEMS:  CONSTITUTIONAL: No fever, weight loss, or fatigue  Patient is  unable to provide any information/ROS  due to baseline mental status.   PHYSICAL EXAM:  Appearance: NAD. VS past 24 hrs -as above   HEENT:   Moist oral mucosa. Conjunctiva clear b/l.   Neck : supple  Respiratory: Lungs CTAB.  Gastrointestinal:  Soft, nontender. No rebound. No rigidity. BS present	  Cardiovascular: RRR ,S1S2 present  Neurologic: Non-focal. Moving all extremities.  Extremities: No edema. No erythema. No calf tenderness.  Skin: No rashes, No ecchymoses, No cyanosis.	  wounds ,skin lesions-See skin assesment flow sheet   LABS:                        10.3   17.66 )-----------( 295      ( 06 Apr 2022 05:22 )             30.7     04-06    136  |  96  |  51<H>  ----------------------------<  213<H>  4.8   |  25  |  7.20<H>    Ca    9.6      06 Apr 2022 05:22  Phos  6.2     04-07  Mg     2.4     04-07    TPro  7.4  /  Alb  3.0<L>  /  TBili  0.7  /  DBili  x   /  AST  19  /  ALT  17  /  AlkPhos  90  04-05    CAPILLARY BLOOD GLUCOSE      POCT Blood Glucose.: 259 mg/dL (07 Apr 2022 08:38)  POCT Blood Glucose.: 397 mg/dL (06 Apr 2022 21:24)  POCT Blood Glucose.: 212 mg/dL (06 Apr 2022 17:00)  POCT Blood Glucose.: 267 mg/dL (06 Apr 2022 14:58)          Culture - Blood (collected 05 Apr 2022 09:28)  Source: .Blood Blood-Venous  Preliminary Report (06 Apr 2022 10:01):    No growth to date.    Culture - Blood (collected 05 Apr 2022 09:28)  Source: .Blood Blood-Venous  Preliminary Report (06 Apr 2022 10:01):    No growth to date.    Culture - Tissue with Gram Stain (collected 30 Mar 2022 18:08)  Source: .Tissue Other, Left Medial Ankle  Gram Stain (30 Mar 2022 20:57):    Few polymorphonuclear leukocytes per low power field    Moderate Gram positive cocci in pairs per oil power field    Rare Gram Negative Rods per oil power field  Preliminary Report (31 Mar 2022 18:16):    Few Klebsiella oxytoca/Raoutella ornithinolytica    Few Escherichia coli    Moderate Staphylococcus aureus  Organism: Klebsiella oxytoca /Raoutella ornithinolytica  Escherichia coli  Staphylococcus aureus (01 Apr 2022 16:04)  Organism: Staphylococcus aureus (01 Apr 2022 16:04)  Organism: Escherichia coli (01 Apr 2022 16:04)  Organism: Klebsiella oxytoca /Raoutella ornithinolytica (01 Apr 2022 16:04)      RADIOLOGY & ADDITIONAL TESTS:< from: US Duplex Arterial Lower Ext Compl, Bilateral (04.06.22 @ 14:05) >  ACC: [id number] EXAM:  US DPLX LWR EXT ARTS COMPL BI                          PROCEDURE DATE:  04/06/2022          INTERPRETATION:  CLINICAL INDICATION: Lower extremity wounds. Evaluate   for lower extremity arterial pathology.    EXAMINATION: Bilateral lower extremity arterial duplex ultrasound    COMPARISON: None    FINDINGS:    Limited evaluation as noted by technologist due to patient motion.    Bilateral lower extremity atheromatous disease.  Progressively delayed upstroke of right common femoral, superficial   femoral, and popliteal arteries noted. The possibility of inflow   significant stenosis cannot be excluded. Trifurcation arteries are not   visualized due to patient motion. Right dorsalis pedis artery could not   be assessed due to overlying bandage material.  Incompletely imaged bypass graft of left left lower extremity, which   demonstrates antegrade flow. The full extent of the bypass graft is not   adequately assessed on this study. The left superficial femoral artery  demonstrates minimal flow. Trifurcation arteries are not visualized due   to patient motion.  IMPRESSION:  Markedly limited study.  Concern for right lower iliac artery extremity inflow disease.  Incompletely visualized bypass graft of leftlower extremity with   antegrade flow.  Correlation with CTA with lower extremity runoff is recommended for   better characterization of lower extremity arterial vasculature.  < end of copied text >   reviewed elctronically  25 minutes aggregate time was spent on this visit, 50% visit time spent in care co-ordination with other attendings and counselling patient .I have discussed care plan with patient / HCP/family member ,who expressed understanding of problems treatment and their effect and side effects, alternatives in details. I have asked if they have any questions and concerns and appropriately addressed them to best of my ability.

## 2022-04-07 NOTE — PROGRESS NOTE ADULT - SUBJECTIVE AND OBJECTIVE BOX
Patient is a 71y Female whom presented to the hospital with esrd on hd     PAST MEDICAL & SURGICAL HISTORY:  Diabetes mellitus II    HTN (hypertension)    h/o Anxiety attack    Depression    h/o Myocardial infarct 2007    CAD (coronary artery disease)    h/o Hepatitis A 1969  currently resolved, no symptoms    PAD (peripheral artery disease)    Murmur, cardiac    h/o Smoking  quitted 3/2012    CRF (chronic renal failure), unspecified stage    Dialysis patient    Anemia secondary to renal failure    HTN (hypertension)    coronary stent 2007    s/p Ovarian cyst removal    s/p surgical removal of benign Skin lesion epigastric area        MEDICATIONS  (STANDING):  dextrose 5%. 1000 milliLiter(s) (100 mL/Hr) IV Continuous <Continuous>  dextrose 5%. 1000 milliLiter(s) (50 mL/Hr) IV Continuous <Continuous>  dextrose 50% Injectable 25 Gram(s) IV Push once  dextrose 50% Injectable 12.5 Gram(s) IV Push once  dextrose 50% Injectable 25 Gram(s) IV Push once  glucagon  Injectable 1 milliGRAM(s) IntraMuscular once  insulin lispro (ADMELOG) corrective regimen sliding scale   SubCutaneous three times a day before meals  piperacillin/tazobactam IVPB.. 3.375 Gram(s) IV Intermittent every 12 hours      Allergies    latex (Unknown)  No Known Drug Allergies    Intolerances        SOCIAL HISTORY:  Denies ETOh,Smoking,     FAMILY HISTORY:      REVIEW OF SYSTEMS:    CONSTITUTIONAL: No weakness, fevers or chills  RESPIRATORY: No cough, wheezing, hemoptysis; No shortness of breath  CARDIOVASCULAR: No chest pain or palpitations  GASTROINTESTINAL: No abdominal or epigastric pain. No nausea, vomiting,     No diarrhea or constipation. No melena   GENITOURINARY: No dysuria, frequency or hematuria                                                   10.3   17.66 )-----------( 295      ( 06 Apr 2022 05:22 )             30.7       CBC Full  -  ( 06 Apr 2022 05:22 )  WBC Count : 17.66 K/uL  RBC Count : 3.28 M/uL  Hemoglobin : 10.3 g/dL  Hematocrit : 30.7 %  Platelet Count - Automated : 295 K/uL  Mean Cell Volume : 93.6 fl  Mean Cell Hemoglobin : 31.4 pg  Mean Cell Hemoglobin Concentration : 33.6 gm/dL  Auto Neutrophil # : 14.82 K/uL  Auto Lymphocyte # : 0.79 K/uL  Auto Monocyte # : 1.85 K/uL  Auto Eosinophil # : 0.01 K/uL  Auto Basophil # : 0.06 K/uL  Auto Neutrophil % : 83.9 %  Auto Lymphocyte % : 4.5 %  Auto Monocyte % : 10.5 %  Auto Eosinophil % : 0.1 %  Auto Basophil % : 0.3 %      04-07    135  |  94<L>  |  62<H>  ----------------------------<  250<H>  3.9   |  27  |  7.10<H>    Ca    9.5      07 Apr 2022 16:45  Phos  6.2     04-07  Mg     2.4     04-07        CAPILLARY BLOOD GLUCOSE      POCT Blood Glucose.: 257 mg/dL (07 Apr 2022 16:49)  POCT Blood Glucose.: 255 mg/dL (07 Apr 2022 12:33)  POCT Blood Glucose.: 259 mg/dL (07 Apr 2022 08:38)  POCT Blood Glucose.: 397 mg/dL (06 Apr 2022 21:24)      Vital Signs Last 24 Hrs  T(C): 36.7 (07 Apr 2022 18:00), Max: 37.2 (07 Apr 2022 04:24)  T(F): 98.1 (07 Apr 2022 18:00), Max: 98.9 (07 Apr 2022 04:24)  HR: 76 (07 Apr 2022 18:00) (76 - 130)  BP: 179/78 (07 Apr 2022 18:00) (102/78 - 179/78)  BP(mean): --  RR: 18 (07 Apr 2022 18:00) (17 - 18)  SpO2: 98% (07 Apr 2022 18:00) (90% - 98%)                    PHYSICAL EXAM:    Constitutional: NAD  HEENT: conjunctive   clear   Neck:  No JVD  Respiratory: decrease bs b/l   Cardiovascular: S1 and S2  Gastrointestinal: BS+, soft, NT/ND  Extremities: No peripheral edema

## 2022-04-07 NOTE — PROGRESS NOTE ADULT - NUTRITIONAL ASSESSMENT
MEDICATIONS  (STANDING):  amLODIPine   Tablet 7.5 milliGRAM(s) Oral every 24 hours  aspirin enteric coated 81 milliGRAM(s) Oral daily  atorvastatin 40 milliGRAM(s) Oral at bedtime  calcium acetate 667 milliGRAM(s) Oral three times a day with meals  cloNIDine 0.1 milliGRAM(s) Oral every 12 hours  clopidogrel Tablet 75 milliGRAM(s) Oral daily  dextrose 5%. 1000 milliLiter(s) (100 mL/Hr) IV Continuous <Continuous>  dextrose 5%. 1000 milliLiter(s) (50 mL/Hr) IV Continuous <Continuous>  dextrose 50% Injectable 25 Gram(s) IV Push once  dextrose 50% Injectable 12.5 Gram(s) IV Push once  dextrose 50% Injectable 25 Gram(s) IV Push once  epoetin fawad-epbx (RETACRIT) Injectable 23066 Unit(s) IV Push <User Schedule>  glucagon  Injectable 1 milliGRAM(s) IntraMuscular once  heparin   Injectable 5000 Unit(s) SubCutaneous every 12 hours  imipramine 50 milliGRAM(s) Oral at bedtime  insulin glargine Injectable (LANTUS) 7 Unit(s) SubCutaneous every morning  insulin lispro (ADMELOG) corrective regimen sliding scale   SubCutaneous at bedtime  insulin lispro (ADMELOG) corrective regimen sliding scale   SubCutaneous three times a day before meals  lactobacillus acidophilus 1 Tablet(s) Oral two times a day  Nephro-elvie 1 Tablet(s) Oral daily  pantoprazole    Tablet 40 milliGRAM(s) Oral before breakfast  piperacillin/tazobactam IVPB.. 3.375 Gram(s) IV Intermittent every 12 hours

## 2022-04-07 NOTE — PROGRESS NOTE ADULT - PROBLEM SELECTOR PLAN 6
Accuchecks monitoring and insulin corrective regimen  sliding scale coverage with short acting inslulin, add longacting insulin as needed ,no concentrated sweets diet, serial labs ,HbA1C,education Accu-Cheks monitoring and insulin corrective regimen  sliding scale coverage with short acting insulin, add long-acting insulin as needed ,no concentrated sweets diet, serial labs ,HbA1C,education

## 2022-04-07 NOTE — PROGRESS NOTE ADULT - NUTRITIONAL ASSESSMENT
This patient has been assessed with a concern for Malnutrition and has been determined to have a diagnosis/diagnoses of Severe protein-calorie malnutrition.    This patient is being managed with:   Diet Consistent Carbohydrate Renal w/Evening Snack-  Easy to Chew (EASYTOCHEW)  Supplement Feeding Modality:  Oral  Nepro Cans or Servings Per Day:  1       Frequency:  Daily  Entered: Apr 6 2022 12:14PM     This patient has been assessed with a concern for Malnutrition and has been determined to have a diagnosis/diagnoses of Severe protein-calorie malnutrition.  This patient is being managed with:   Diet Consistent Carbohydrate Renal w/Evening Snack-  Easy to Chew (EASYTOCHEW)  Supplement Feeding Modality:  Oral  Nepro Cans or Servings Per Day:  1       Frequency:  Daily  Entered: Apr 6 2022 12:14PM

## 2022-04-07 NOTE — PROGRESS NOTE ADULT - SUBJECTIVE AND OBJECTIVE BOX
Patient is a 71y Female with a known history of :  Fluid overload [E87.70]    ESRD on dialysis [N18.6]    Poor compliance [Z91.19]    Hyperkalemia [E87.5]    DM (diabetes mellitus) [E11.9]    HTN (hypertension) [I10]    CAD (coronary artery disease) [I25.10]    Prophylactic measure [Z29.9]    Foot infection [L08.9]    Atrial fibrillation with tachycardic ventricular rate [I48.91]    Diarrhea [R19.7]    PAD (peripheral artery disease) [I73.9]      HPI:  70yo female bib ems with sob, pt states she has missed the last 2 rounds of dialysis and is due today, and has been having worsening sob, no cough, fever, chills, no chest pain no other complaints .Found to have hyperkalemia Admitted  to telemetry unit for monitoring , send 3 sets of cardiac enzymes to rule out acute coronary event, obtain ECHO to evaluate LVEF, cardiology consult  ,continue current management, O2 supply, anticoagulation plan as per cardiology consult Nephrology consult stat requested ,management d/w Dr Perez and  Palliative care consult requested ,to discuss advance directives and complete MOLST  (05 Apr 2022 07:22)      REVIEW OF SYSTEMS:    CONSTITUTIONAL: No fever, weight loss, or fatigue  EYES: No eye pain, visual disturbances, or discharge  ENMT:  No difficulty hearing, tinnitus, vertigo; No sinus or throat pain  NECK: No pain or stiffness  BREASTS: No pain, masses, or nipple discharge  RESPIRATORY: No cough, wheezing, chills or hemoptysis; No shortness of breath  CARDIOVASCULAR: No chest pain, palpitations, dizziness, or leg swelling  GASTROINTESTINAL: No abdominal or epigastric pain. No nausea, vomiting, or hematemesis; No diarrhea or constipation. No melena or hematochezia.  GENITOURINARY: No dysuria, frequency, hematuria, or incontinence  NEUROLOGICAL: No headaches, memory loss, loss of strength, numbness, or tremors  SKIN: No itching, burning, rashes, or lesions   LYMPH NODES: No enlarged glands  ENDOCRINE: No heat or cold intolerance; No hair loss  MUSCULOSKELETAL: No joint pain or swelling; No muscle, back, or extremity pain  PSYCHIATRIC: No depression, anxiety, mood swings, or difficulty sleeping  HEME/LYMPH: No easy bruising, or bleeding gums  ALLERGY AND IMMUNOLOGIC: No hives or eczema    MEDICATIONS  (STANDING):  amLODIPine   Tablet 7.5 milliGRAM(s) Oral every 24 hours  aspirin enteric coated 81 milliGRAM(s) Oral daily  atorvastatin 40 milliGRAM(s) Oral at bedtime  calcium acetate 667 milliGRAM(s) Oral three times a day with meals  cloNIDine 0.1 milliGRAM(s) Oral every 12 hours  clopidogrel Tablet 75 milliGRAM(s) Oral daily  dextrose 5%. 1000 milliLiter(s) (100 mL/Hr) IV Continuous <Continuous>  dextrose 5%. 1000 milliLiter(s) (50 mL/Hr) IV Continuous <Continuous>  dextrose 50% Injectable 25 Gram(s) IV Push once  dextrose 50% Injectable 12.5 Gram(s) IV Push once  dextrose 50% Injectable 25 Gram(s) IV Push once  diltiazem Infusion 10 mG/Hr (10 mL/Hr) IV Continuous <Continuous>  epoetin fawad-epbx (RETACRIT) Injectable 12740 Unit(s) IV Push <User Schedule>  glucagon  Injectable 1 milliGRAM(s) IntraMuscular once  heparin   Injectable 5000 Unit(s) SubCutaneous every 12 hours  imipramine 50 milliGRAM(s) Oral at bedtime  insulin glargine Injectable (LANTUS) 7 Unit(s) SubCutaneous every morning  insulin lispro (ADMELOG) corrective regimen sliding scale   SubCutaneous at bedtime  insulin lispro (ADMELOG) corrective regimen sliding scale   SubCutaneous three times a day before meals  lactobacillus acidophilus 1 Tablet(s) Oral two times a day  metoprolol tartrate 100 milliGRAM(s) Oral every 12 hours  Nephro-elvie 1 Tablet(s) Oral daily  pantoprazole    Tablet 40 milliGRAM(s) Oral before breakfast  piperacillin/tazobactam IVPB.. 3.375 Gram(s) IV Intermittent every 12 hours    MEDICATIONS  (PRN):  acetaminophen     Tablet .. 650 milliGRAM(s) Oral every 6 hours PRN Temp greater or equal to 38C (100.4F), Mild Pain (1 - 3)  aluminum hydroxide/magnesium hydroxide/simethicone Suspension 30 milliLiter(s) Oral every 4 hours PRN Dyspepsia  dextrose Oral Gel 15 Gram(s) Oral once PRN Blood Glucose LESS THAN 70 milliGRAM(s)/deciliter  melatonin 3 milliGRAM(s) Oral at bedtime PRN Insomnia  ondansetron Injectable 4 milliGRAM(s) IV Push every 8 hours PRN Nausea and/or Vomiting  traMADol 50 milliGRAM(s) Oral three times a day PRN Moderate Pain (4 - 6)      ALLERGIES: latex (Unknown)  No Known Drug Allergies      FAMILY HISTORY:      PHYSICAL EXAMINATION:  -----------------------------  T(C): 37.1 (04-07-22 @ 06:30), Max: 37.3 (04-06-22 @ 17:30)  HR: 123 (04-07-22 @ 06:30) (86 - 130)  BP: 141/84 (04-07-22 @ 06:58) (102/78 - 174/62)  RR: 17 (04-07-22 @ 06:58) (17 - 19)  SpO2: 95% (04-07-22 @ 06:58) (91% - 98%)  Wt(kg): --    04-06 @ 07:01  -  04-07 @ 07:00  --------------------------------------------------------  IN:  Total IN: 0 mL    OUT:    Other (mL): 200 mL  Total OUT: 200 mL    Total NET: -200 mL            VITALS  T(C): 37.1 (04-07-22 @ 06:30), Max: 37.3 (04-06-22 @ 17:30)  HR: 123 (04-07-22 @ 06:30) (86 - 130)  BP: 141/84 (04-07-22 @ 06:58) (102/78 - 174/62)  RR: 17 (04-07-22 @ 06:58) (17 - 19)  SpO2: 95% (04-07-22 @ 06:58) (91% - 98%)    Constitutional: well developed, normal appearance, well groomed, well nourished, no deformities and no acute distress.   Eyes: the conjunctiva exhibited no abnormalities and the eyelids demonstrated no xanthelasmas.   HEENT: normal oral mucosa, no oral pallor and no oral cyanosis.   Neck: normal jugular venous A waves present, normal jugular venous V waves present and no jugular venous wilson A waves.   Pulmonary: no respiratory distress, normal respiratory rhythm and effort, no accessory muscle use and lungs were clear to auscultation bilaterally.   Cardiovascular: heart rate and rhythm were normal, normal S1 and S2 and no murmur, gallop, rub, heave or thrill are present.   Abdomen: soft, non-tender, no hepato-splenomegaly and no abdominal mass palpated.   Musculoskeletal: the gait could not be assessed..   Extremities: no clubbing of the fingernails, no localized cyanosis, no petechial hemorrhages and no ischemic changes.   Skin: normal skin color and pigmentation, no rash, no venous stasis, no skin lesions, no skin ulcer and no xanthoma was observed.   Psychiatric: oriented to person, place, and time, the affect was normal, the mood was normal and not feeling anxious.     LABS:   --------  04-06    136  |  96  |  51<H>  ----------------------------<  213<H>  4.8   |  25  |  7.20<H>    Ca    9.6      06 Apr 2022 05:22  Phos  6.2     04-07  Mg     2.4     04-07    TPro  7.4  /  Alb  3.0<L>  /  TBili  0.7  /  DBili  x   /  AST  19  /  ALT  17  /  AlkPhos  90  04-05                         10.3   17.66 )-----------( 295      ( 06 Apr 2022 05:22 )             30.7             Culture Results:   No growth to date. (04-05 @ 09:28)  Culture Results:   No growth to date. (04-05 @ 09:28)      RADIOLOGY:  -----------------    ECG:     ECHO:

## 2022-04-08 LAB
ANION GAP SERPL CALC-SCNC: 10 MMOL/L — SIGNIFICANT CHANGE UP (ref 5–17)
BUN SERPL-MCNC: 37 MG/DL — HIGH (ref 7–23)
CALCIUM SERPL-MCNC: 9.3 MG/DL — SIGNIFICANT CHANGE UP (ref 8.5–10.1)
CHLORIDE SERPL-SCNC: 96 MMOL/L — SIGNIFICANT CHANGE UP (ref 96–108)
CO2 SERPL-SCNC: 29 MMOL/L — SIGNIFICANT CHANGE UP (ref 22–31)
CREAT SERPL-MCNC: 4.7 MG/DL — HIGH (ref 0.5–1.3)
EGFR: 9 ML/MIN/1.73M2 — LOW
GLUCOSE SERPL-MCNC: 229 MG/DL — HIGH (ref 70–99)
HCT VFR BLD CALC: 30.4 % — LOW (ref 34.5–45)
HGB BLD-MCNC: 10.3 G/DL — LOW (ref 11.5–15.5)
MCHC RBC-ENTMCNC: 31.5 PG — SIGNIFICANT CHANGE UP (ref 27–34)
MCHC RBC-ENTMCNC: 33.9 GM/DL — SIGNIFICANT CHANGE UP (ref 32–36)
MCV RBC AUTO: 93 FL — SIGNIFICANT CHANGE UP (ref 80–100)
MRSA PCR RESULT.: SIGNIFICANT CHANGE UP
NRBC # BLD: 0 /100 WBCS — SIGNIFICANT CHANGE UP (ref 0–0)
PLATELET # BLD AUTO: 359 K/UL — SIGNIFICANT CHANGE UP (ref 150–400)
POTASSIUM SERPL-MCNC: 4 MMOL/L — SIGNIFICANT CHANGE UP (ref 3.5–5.3)
POTASSIUM SERPL-SCNC: 4 MMOL/L — SIGNIFICANT CHANGE UP (ref 3.5–5.3)
RBC # BLD: 3.27 M/UL — LOW (ref 3.8–5.2)
RBC # FLD: 17.6 % — HIGH (ref 10.3–14.5)
S AUREUS DNA NOSE QL NAA+PROBE: SIGNIFICANT CHANGE UP
SODIUM SERPL-SCNC: 135 MMOL/L — SIGNIFICANT CHANGE UP (ref 135–145)
WBC # BLD: 11.86 K/UL — HIGH (ref 3.8–10.5)
WBC # FLD AUTO: 11.86 K/UL — HIGH (ref 3.8–10.5)

## 2022-04-08 PROCEDURE — 73718 MRI LOWER EXTREMITY W/O DYE: CPT | Mod: 26,RT

## 2022-04-08 PROCEDURE — 99232 SBSQ HOSP IP/OBS MODERATE 35: CPT

## 2022-04-08 PROCEDURE — 73721 MRI JNT OF LWR EXTRE W/O DYE: CPT | Mod: 26,LT

## 2022-04-08 PROCEDURE — 93010 ELECTROCARDIOGRAM REPORT: CPT

## 2022-04-08 PROCEDURE — 93010 ELECTROCARDIOGRAM REPORT: CPT | Mod: 77

## 2022-04-08 PROCEDURE — 75635 CT ANGIO ABDOMINAL ARTERIES: CPT | Mod: 26

## 2022-04-08 RX ADMIN — Medication 1 TABLET(S): at 17:57

## 2022-04-08 RX ADMIN — PIPERACILLIN AND TAZOBACTAM 25 GRAM(S): 4; .5 INJECTION, POWDER, LYOPHILIZED, FOR SOLUTION INTRAVENOUS at 18:07

## 2022-04-08 RX ADMIN — Medication 60 MILLIGRAM(S): at 05:50

## 2022-04-08 RX ADMIN — PANTOPRAZOLE SODIUM 40 MILLIGRAM(S): 20 TABLET, DELAYED RELEASE ORAL at 05:50

## 2022-04-08 RX ADMIN — INSULIN GLARGINE 7 UNIT(S): 100 INJECTION, SOLUTION SUBCUTANEOUS at 08:30

## 2022-04-08 RX ADMIN — Medication 60 MILLIGRAM(S): at 14:49

## 2022-04-08 RX ADMIN — AMLODIPINE BESYLATE 7.5 MILLIGRAM(S): 2.5 TABLET ORAL at 21:32

## 2022-04-08 RX ADMIN — Medication 1 TABLET(S): at 14:49

## 2022-04-08 RX ADMIN — Medication 5: at 17:59

## 2022-04-08 RX ADMIN — Medication 100 MILLIGRAM(S): at 05:51

## 2022-04-08 RX ADMIN — Medication 0.1 MILLIGRAM(S): at 21:32

## 2022-04-08 RX ADMIN — Medication 1 TABLET(S): at 05:50

## 2022-04-08 RX ADMIN — APIXABAN 2.5 MILLIGRAM(S): 2.5 TABLET, FILM COATED ORAL at 21:32

## 2022-04-08 RX ADMIN — Medication 100 MILLIGRAM(S): at 17:59

## 2022-04-08 RX ADMIN — Medication 2: at 08:30

## 2022-04-08 RX ADMIN — Medication 60 MILLIGRAM(S): at 21:32

## 2022-04-08 RX ADMIN — PIPERACILLIN AND TAZOBACTAM 25 GRAM(S): 4; .5 INJECTION, POWDER, LYOPHILIZED, FOR SOLUTION INTRAVENOUS at 05:49

## 2022-04-08 RX ADMIN — ATORVASTATIN CALCIUM 40 MILLIGRAM(S): 80 TABLET, FILM COATED ORAL at 21:32

## 2022-04-08 RX ADMIN — Medication 667 MILLIGRAM(S): at 17:57

## 2022-04-08 RX ADMIN — Medication 50 MILLIGRAM(S): at 21:32

## 2022-04-08 RX ADMIN — CLOPIDOGREL BISULFATE 75 MILLIGRAM(S): 75 TABLET, FILM COATED ORAL at 14:49

## 2022-04-08 RX ADMIN — Medication 81 MILLIGRAM(S): at 14:50

## 2022-04-08 NOTE — PROGRESS NOTE ADULT - SUBJECTIVE AND OBJECTIVE BOX
patient seen in dialysis unit, sitting in bed in NAD, looks comfortable , states she feels much better today, no complaints at this time , no LE pain,     T(F): 97.6 (04-08-22 @ 11:25), Max: 98.8 (04-08-22 @ 04:17)  HR: 77 (04-08-22 @ 11:30) (74 - 97)  BP: 164/75 (04-08-22 @ 11:30) (162/68 - 179/78)  RR: 18 (04-08-22 @ 11:25) (17 - 18)  SpO2: 94% (04-08-22 @ 11:25) (90% - 98%)  Wt(kg): --  CAPILLARY BLOOD GLUCOSE      POCT Blood Glucose.: 200 mg/dL (08 Apr 2022 13:39)  POCT Blood Glucose.: 215 mg/dL (08 Apr 2022 08:10)  POCT Blood Glucose.: 154 mg/dL (07 Apr 2022 21:43)  POCT Blood Glucose.: 257 mg/dL (07 Apr 2022 16:49)      PHYSICAL EXAM:  General: NAD, WDWN.   Neuro:  Alert & oriented x 3  CV: +S1+S2 regular rate and rhythm  Lung: clear to ausculation bilaterally, respirations nonlabored, good inspiratory effort  Abdomen: soft, NT ND. Normative BS  Extremities: no pedal edema or calf tenderness noted left dressing C/D/I, no pain tara cited on exam , no calf tenderness       LABS:                        10.3   11.86 )-----------( 359      ( 08 Apr 2022 07:41 )             30.4     04-08    135  |  96  |  37<H>  ----------------------------<  229<H>  4.0   |  29  |  4.70<H>    Ca    9.3      08 Apr 2022 07:41  Phos  6.2     04-07  Mg     2.4     04-07        I&O's Detail    07 Apr 2022 07:01  -  08 Apr 2022 07:00  --------------------------------------------------------  IN:  Total IN: 0 mL    OUT:    Other (mL): 1600 mL  Total OUT: 1600 mL    Total NET: -1600 mL          Impression: 71y Female admitted with End-stage renal disease      PMH Diabetes mellitus II    HTN (hypertension)    h/o Anxiety attack    Depression    h/o Myocardial infarct 2007    CAD (coronary artery disease)    CAD (coronary artery disease)    h/o Hepatitis A 1969    PAD (peripheral artery disease)    Murmur, cardiac    h/o Smoking    CRF (chronic renal failure), unspecified stage    Dialysis patient    Anemia secondary to renal failure    HTN (hypertension)        Plan:  -cont current care  awaiting CTA result will follow

## 2022-04-08 NOTE — PROGRESS NOTE ADULT - PROBLEM SELECTOR PLAN 9
Concern for right lower iliac artery extremity inflow disease.  Incompletely visualized bypass graft of left lower extremity with   antegrade flow .Vascular surgery consult requested

## 2022-04-08 NOTE — PROGRESS NOTE ADULT - NUTRITIONAL ASSESSMENT
MEDICATIONS  (STANDING):  amLODIPine   Tablet 7.5 milliGRAM(s) Oral every 24 hours  aspirin enteric coated 81 milliGRAM(s) Oral daily  atorvastatin 40 milliGRAM(s) Oral at bedtime  calcium acetate 667 milliGRAM(s) Oral three times a day with meals  cloNIDine 0.1 milliGRAM(s) Oral every 12 hours  clopidogrel Tablet 75 milliGRAM(s) Oral daily  dextrose 5%. 1000 milliLiter(s) (100 mL/Hr) IV Continuous <Continuous>  dextrose 5%. 1000 milliLiter(s) (50 mL/Hr) IV Continuous <Continuous>  dextrose 50% Injectable 25 Gram(s) IV Push once  dextrose 50% Injectable 12.5 Gram(s) IV Push once  dextrose 50% Injectable 25 Gram(s) IV Push once  epoetin fawad-epbx (RETACRIT) Injectable 28252 Unit(s) IV Push <User Schedule>  glucagon  Injectable 1 milliGRAM(s) IntraMuscular once  heparin   Injectable 5000 Unit(s) SubCutaneous every 12 hours  imipramine 50 milliGRAM(s) Oral at bedtime  insulin glargine Injectable (LANTUS) 7 Unit(s) SubCutaneous every morning  insulin lispro (ADMELOG) corrective regimen sliding scale   SubCutaneous at bedtime  insulin lispro (ADMELOG) corrective regimen sliding scale   SubCutaneous three times a day before meals  lactobacillus acidophilus 1 Tablet(s) Oral two times a day  Nephro-elvie 1 Tablet(s) Oral daily  pantoprazole    Tablet 40 milliGRAM(s) Oral before breakfast  piperacillin/tazobactam IVPB.. 3.375 Gram(s) IV Intermittent every 12 hours

## 2022-04-08 NOTE — PROGRESS NOTE ADULT - ASSESSMENT
72yo female with hx of ESRD on HD, who presented with SOB after missing dialysis x 2. Found to have leukocytosis, likely multifactorial in the setting of fluid overload and possible pneumonia. She remains afebrile and WBC improving. Blood cultures remain no growth. MRSA nasal PCR is negative.    She is also being evaluated for right foot osteomyelitis, antibiotic management pending MRI results. No drainage or surrounding cellulitis noted from LE ulcers. Recent foot xray from 3/30/22 showed no evidence of osteomyelitis, and recent tissue cultures from 3/30/22 grew klebsiella oxytoca/raoutella oxytoca, E. coli, MSSA.    1. Leukocytosis, possible pneumonia  -continue Zosyn until 4/11 to complete 7 days    2. Right foot ulcers  -further antibiotics pending MRI  -follow up Right foot MRI, L ankle MRI     I will be covered by Dr. Long this weekend, 4/9-4/10.    Lupe Tsai MD  Division of Infectious Diseases   Cell [phone] between 8am and 6pm   After 6pm and weekends please call ID service at [phone].

## 2022-04-08 NOTE — PROGRESS NOTE ADULT - SUBJECTIVE AND OBJECTIVE BOX
PROGRESS NOTE  Patient is a 71y old  Female who presents with a chief complaint of shortness of breath (08 Apr 2022 09:17)  Chart and available morning labs /imaging are reviewed electronically , urgent issues addressed . More information  is being added upon completion of rounds , when more information is collected and management discussed with consultants , medical staff and social service/case management on the floor   OVERNIGHT  No new issues reported by medical staff . All above noted Patient is resting in a bed comfortably .Confused ,poor mentation .No distress noted   HPI:  72yo female bib ems with sob, pt states she has missed the last 2 rounds of dialysis and is due today, and has been having worsening sob, no cough, fever, chills, no chest pain no other complaints .Found to have hyperkalemia Admitted  to telemetry unit for monitoring , send 3 sets of cardiac enzymes to rule out acute coronary event, obtain ECHO to evaluate LVEF, cardiology consult  ,continue current management, O2 supply, anticoagulation plan as per cardiology consult Nephrology consult stat requested ,management d/w Dr Perez and  Palliative care consult requested ,to discuss advance directives and complete MOLST  (05 Apr 2022 07:22)  PAST MEDICAL & SURGICAL HISTORY:  Diabetes mellitus II    HTN (hypertension)    h/o Anxiety attack    Depression    h/o Myocardial infarct 2007    CAD (coronary artery disease)    h/o Hepatitis A 1969  currently resolved, no symptoms    PAD (peripheral artery disease)    Murmur, cardiac    h/o Smoking  quitted 3/2012    CRF (chronic renal failure), unspecified stage    Dialysis patient    Anemia secondary to renal failure    HTN (hypertension)    coronary stent 2007    s/p Ovarian cyst removal    s/p surgical removal of benign Skin lesion epigastric area        MEDICATIONS  (STANDING):  amLODIPine   Tablet 7.5 milliGRAM(s) Oral every 24 hours  apixaban 2.5 milliGRAM(s) Oral every 12 hours  aspirin enteric coated 81 milliGRAM(s) Oral daily  atorvastatin 40 milliGRAM(s) Oral at bedtime  calcium acetate 667 milliGRAM(s) Oral three times a day with meals  cloNIDine 0.1 milliGRAM(s) Oral every 12 hours  clopidogrel Tablet 75 milliGRAM(s) Oral daily  dextrose 5%. 1000 milliLiter(s) (100 mL/Hr) IV Continuous <Continuous>  dextrose 5%. 1000 milliLiter(s) (50 mL/Hr) IV Continuous <Continuous>  dextrose 50% Injectable 25 Gram(s) IV Push once  dextrose 50% Injectable 12.5 Gram(s) IV Push once  dextrose 50% Injectable 25 Gram(s) IV Push once  diltiazem    Tablet 60 milliGRAM(s) Oral every 8 hours  epoetin fawad-epbx (RETACRIT) Injectable 19568 Unit(s) IV Push <User Schedule>  glucagon  Injectable 1 milliGRAM(s) IntraMuscular once  imipramine 50 milliGRAM(s) Oral at bedtime  insulin glargine Injectable (LANTUS) 7 Unit(s) SubCutaneous every morning  insulin lispro (ADMELOG) corrective regimen sliding scale   SubCutaneous at bedtime  insulin lispro (ADMELOG) corrective regimen sliding scale   SubCutaneous three times a day before meals  lactobacillus acidophilus 1 Tablet(s) Oral two times a day  metoprolol tartrate 100 milliGRAM(s) Oral every 12 hours  Nephro-elvie 1 Tablet(s) Oral daily  pantoprazole    Tablet 40 milliGRAM(s) Oral before breakfast  piperacillin/tazobactam IVPB.. 3.375 Gram(s) IV Intermittent every 12 hours    MEDICATIONS  (PRN):  acetaminophen     Tablet .. 650 milliGRAM(s) Oral every 6 hours PRN Temp greater or equal to 38C (100.4F), Mild Pain (1 - 3)  aluminum hydroxide/magnesium hydroxide/simethicone Suspension 30 milliLiter(s) Oral every 4 hours PRN Dyspepsia  dextrose Oral Gel 15 Gram(s) Oral once PRN Blood Glucose LESS THAN 70 milliGRAM(s)/deciliter  melatonin 3 milliGRAM(s) Oral at bedtime PRN Insomnia  ondansetron Injectable 4 milliGRAM(s) IV Push every 8 hours PRN Nausea and/or Vomiting  traMADol 50 milliGRAM(s) Oral three times a day PRN Moderate Pain (4 - 6)      OBJECTIVE    T(C): 36.4 (04-08-22 @ 11:25), Max: 37.1 (04-08-22 @ 04:17)  HR: 77 (04-08-22 @ 11:30) (74 - 97)  BP: 164/75 (04-08-22 @ 11:30) (162/68 - 179/78)  RR: 18 (04-08-22 @ 11:25) (17 - 18)  SpO2: 94% (04-08-22 @ 11:25) (90% - 98%)  Wt(kg): --  I&O's Summary    07 Apr 2022 07:01  -  08 Apr 2022 07:00  --------------------------------------------------------  IN: 0 mL / OUT: 1600 mL / NET: -1600 mL          REVIEW OF SYSTEMS:  CONSTITUTIONAL: No fever, weight loss, or fatigue  EYES: No eye pain, visual disturbances, or discharge  ENMT:   No sinus or throat pain  NECK: No pain or stiffness  RESPIRATORY: No cough, wheezing, chills or hemoptysis; No shortness of breath  CARDIOVASCULAR: No chest pain, palpitations, dizziness, or leg swelling  GASTROINTESTINAL: No abdominal pain. No nausea, vomiting; No diarrhea or constipation. No melena or hematochezia.  GENITOURINARY: No dysuria, frequency, hematuria, or incontinence  NEUROLOGICAL: No headaches, memory loss, loss of strength, numbness, or tremors  SKIN: No itching, burning, rashes, or lesions   MUSCULOSKELETAL: No joint pain or swelling; No muscle, back, or extremity pain    PHYSICAL EXAM:  Appearance: NAD. VS past 24 hrs -as above   HEENT:   Moist oral mucosa. Conjunctiva clear b/l.   Neck : supple  Respiratory: Lungs CTAB.  Gastrointestinal:  Soft, nontender. No rebound. No rigidity. BS present	  Cardiovascular: RRR ,S1S2 present  Neurologic: Non-focal. Moving all extremities.  Extremities: No edema. No erythema. No calf tenderness.  Skin: No rashes, No ecchymoses, No cyanosis.	  wounds ,skin lesions-See skin assesment flow sheet   LABS:                        10.3   11.86 )-----------( 359      ( 08 Apr 2022 07:41 )             30.4     04-08    135  |  96  |  37<H>  ----------------------------<  229<H>  4.0   |  29  |  4.70<H>    Ca    9.3      08 Apr 2022 07:41  Phos  6.2     04-07  Mg     2.4     04-07      CAPILLARY BLOOD GLUCOSE      POCT Blood Glucose.: 215 mg/dL (08 Apr 2022 08:10)  POCT Blood Glucose.: 154 mg/dL (07 Apr 2022 21:43)  POCT Blood Glucose.: 257 mg/dL (07 Apr 2022 16:49)          Culture - Blood (collected 05 Apr 2022 09:28)  Source: .Blood Blood-Venous  Preliminary Report (06 Apr 2022 10:01):    No growth to date.    Culture - Blood (collected 05 Apr 2022 09:28)  Source: .Blood Blood-Venous  Preliminary Report (06 Apr 2022 10:01):    No growth to date.    Culture - Tissue with Gram Stain (collected 30 Mar 2022 18:08)  Source: .Tissue Other, Left Medial Ankle  Gram Stain (30 Mar 2022 20:57):    Few polymorphonuclear leukocytes per low power field    Moderate Gram positive cocci in pairs per oil power field    Rare Gram Negative Rods per oil power field  Preliminary Report (31 Mar 2022 18:16):    Few Klebsiella oxytoca/Raoutella ornithinolytica    Few Escherichia coli    Moderate Staphylococcus aureus  Organism: Klebsiella oxytoca /Raoutella ornithinolytica  Escherichia coli  Staphylococcus aureus (01 Apr 2022 16:04)  Organism: Staphylococcus aureus (01 Apr 2022 16:04)  Organism: Escherichia coli (01 Apr 2022 16:04)  Organism: Klebsiella oxytoca /Raoutella ornithinolytica (01 Apr 2022 16:04)      RADIOLOGY & ADDITIONAL TESTS: < from: US Duplex Arterial Lower Ext Compl, Bilateral (04.06.22 @ 14:05) >  ACC: [id number] EXAM:  US DPLX LWR EXT ARTS COMPL BI                          PROCEDURE DATE:  04/06/2022          INTERPRETATION:  CLINICAL INDICATION: Lower extremity wounds. Evaluate   for lower extremity arterial pathology.    EXAMINATION: Bilateral lower extremity arterial duplex ultrasound    COMPARISON: None    FINDINGS:    Limited evaluation as noted by technologist due to patient motion.    Bilateral lower extremity atheromatous disease.    Progressively delayed upstroke of right common femoral, superficial   femoral, and popliteal arteries noted. The possibility of inflow   significant stenosis cannot be excluded. Trifurcation arteries are not   visualized due to patient motion. Right dorsalis pedis artery could not   be assessed due to overlying bandage material.    Incompletely imaged bypass graft of left left lower extremity, which   demonstrates antegrade flow. The full extent of the bypass graft is not   adequately assessed on this study. The left superficial femoral artery  demonstrates minimal flow. Trifurcation arteries are not visualized due   to patient motion.    IMPRESSION:    Markedly limited study.    Concern for right lower iliac artery extremity inflow disease.    Incompletely visualized bypass graft of leftlower extremity with   antegrade flow.    Correlation with CTA with lower extremity runoff is recommended for   better characterization of lower extremity arterial vasculature.    Phone message was left with Dr. Larson's service.    < end of copied text >     reviewed elctronically  25 minutes aggregate time was spent on this visit, 50% visit time spent in care co-ordination with other attendings and counselling patient .I have discussed care plan with patient / HCP/family member ,who expressed understanding of problems treatment and their effect and side effects, alternatives in details. I have asked if they have any questions and concerns and appropriately addressed them to best of my ability.

## 2022-04-08 NOTE — PROGRESS NOTE ADULT - SUBJECTIVE AND OBJECTIVE BOX
[facility]  INFECTIOUS DISEASES - [address], [place] [address]  Tel: [phone]     Fax: [phone]  =======================================================    SHILO KOHLER [id number]    Follow up: No fevers. Seen on O2 via nasal cannula. Feels better, breathing improved and with minimal cough. Denies any pain, nausea, or diarrhea.    Allergies:  latex (Unknown)  No Known Drug Allergies      Antibiotics:  acetaminophen     Tablet .. 650 milliGRAM(s) Oral every 6 hours PRN  aluminum hydroxide/magnesium hydroxide/simethicone Suspension 30 milliLiter(s) Oral every 4 hours PRN  amLODIPine   Tablet 7.5 milliGRAM(s) Oral every 24 hours  apixaban 2.5 milliGRAM(s) Oral every 12 hours  aspirin enteric coated 81 milliGRAM(s) Oral daily  atorvastatin 40 milliGRAM(s) Oral at bedtime  calcium acetate 667 milliGRAM(s) Oral three times a day with meals  cloNIDine 0.1 milliGRAM(s) Oral every 12 hours  clopidogrel Tablet 75 milliGRAM(s) Oral daily  dextrose 5%. 1000 milliLiter(s) IV Continuous <Continuous>  dextrose 5%. 1000 milliLiter(s) IV Continuous <Continuous>  dextrose 50% Injectable 25 Gram(s) IV Push once  dextrose 50% Injectable 12.5 Gram(s) IV Push once  dextrose 50% Injectable 25 Gram(s) IV Push once  dextrose Oral Gel 15 Gram(s) Oral once PRN  diltiazem    Tablet 60 milliGRAM(s) Oral every 8 hours  epoetin fawad-epbx (RETACRIT) Injectable 28673 Unit(s) IV Push <User Schedule>  glucagon  Injectable 1 milliGRAM(s) IntraMuscular once  imipramine 50 milliGRAM(s) Oral at bedtime  insulin glargine Injectable (LANTUS) 7 Unit(s) SubCutaneous every morning  insulin lispro (ADMELOG) corrective regimen sliding scale   SubCutaneous at bedtime  insulin lispro (ADMELOG) corrective regimen sliding scale   SubCutaneous three times a day before meals  lactobacillus acidophilus 1 Tablet(s) Oral two times a day  melatonin 3 milliGRAM(s) Oral at bedtime PRN  metoprolol tartrate 100 milliGRAM(s) Oral every 12 hours  Nephro-elvie 1 Tablet(s) Oral daily  ondansetron Injectable 4 milliGRAM(s) IV Push every 8 hours PRN  pantoprazole    Tablet 40 milliGRAM(s) Oral before breakfast  piperacillin/tazobactam IVPB.. 3.375 Gram(s) IV Intermittent every 12 hours  traMADol 50 milliGRAM(s) Oral three times a day PRN       REVIEW OF SYSTEMS:  CONSTITUTIONAL:  No Fever or chills  CARDIOVASCULAR:  No chest pain  RESPIRATORY: see history  GASTROINTESTINAL:  No nausea, vomiting. no abdominal pain  GENITOURINARY:  No dysuria, frequency or urgency.  MUSCULOSKELETAL:  no back pain  NEUROLOGIC:  No headache, no dizziness  PSYCHIATRIC:  No disorder of thought or mood.     Physical Exam:  ICU Vital Signs Last 24 Hrs  T(C): 36.4 (08 Apr 2022 11:25), Max: 37.1 (08 Apr 2022 04:17)  T(F): 97.6 (08 Apr 2022 11:25), Max: 98.8 (08 Apr 2022 04:17)  HR: 85 (08 Apr 2022 14:45) (74 - 97)  BP: 168/62 (08 Apr 2022 14:45) (162/68 - 179/78)  BP(mean): --  ABP: --  ABP(mean): --  RR: 18 (08 Apr 2022 14:45) (17 - 18)  SpO2: 94% (08 Apr 2022 11:25) (90% - 98%)     GEN: NAD  HEENT: normocephalic and atraumatic.     NECK: Supple.   LUNGS: Clear to auscultation.  HEART: Regular rate and rhythm  ABDOMEN: Soft, nontender, and nondistended.   EXTREMITIES: No knee swelling, no leg edema. RUE AV fistula  NEUROLOGIC: AOx3  PSYCHIATRIC: Appropriate affect .  SKIN: R 1st and 2nd toe ulcers, no drainage, no surrounding warmth or swelling noted  left medial heel ulcer, no drainage, no surrounding swelling or erythema      Labs:  04-08    135  |  96  |  37<H>  ----------------------------<  229<H>  4.0   |  29  |  4.70<H>    Ca    9.3      08 Apr 2022 07:41  Phos  6.2     04-07  Mg     2.4     04-07                            10.3   11.86 )-----------( 359      ( 08 Apr 2022 07:41 )             30.4             RECENT CULTURES:  04-05 @ 09:28 .Blood Blood-Venous     No growth to date.        03-30 @ 18:08 .Tissue Other, Left Medial Ankle Klebsiella oxytoca /Raoutella ornithinolytica  Escherichia coli  Staphylococcus aureus    Few Klebsiella oxytoca/Raoutella ornithinolytica  Few Escherichia coli  Moderate Staphylococcus aureus    Few polymorphonuclear leukocytes per low power field  Moderate Gram positive cocci in pairs per oil power field  Rare Gram Negative Rods per oil power field            All imaging and data are reviewed.

## 2022-04-08 NOTE — PROGRESS NOTE ADULT - NS PANP COMMENT GEN_ALL_CORE FT
CTA reviewed. L Fem distal bypass patent. L EIA occl. Patient has had a recent LLE bypass and should follow up with her vascular surgeon. No vascular surgery intervention needed during this admission. She may need LLE intervention in the future. Continue wound care.

## 2022-04-08 NOTE — PROGRESS NOTE ADULT - SUBJECTIVE AND OBJECTIVE BOX
Date/Time Patient Seen:  		  Referring MD:   Data Reviewed	       Patient is a 71y old  Female who presents with a chief complaint of shortness of breath (07 Apr 2022 17:49)      Subjective/HPI     PAST MEDICAL & SURGICAL HISTORY:  Diabetes mellitus II    HTN (hypertension)    h/o Anxiety attack    Depression    h/o Myocardial infarct 2007    CAD (coronary artery disease)    CAD (coronary artery disease)    h/o Hepatitis A 1969  currently resolved, no symptoms    PAD (peripheral artery disease)    Murmur, cardiac    h/o Smoking  quitted 3/2012    CRF (chronic renal failure), unspecified stage    Dialysis patient    Anemia secondary to renal failure    HTN (hypertension)    coronary stent 2007    s/p Ovarian cyst removal    s/p surgical removal of benign Skin lesion epigastric area          Medication list         MEDICATIONS  (STANDING):  amLODIPine   Tablet 7.5 milliGRAM(s) Oral every 24 hours  apixaban 2.5 milliGRAM(s) Oral every 12 hours  aspirin enteric coated 81 milliGRAM(s) Oral daily  atorvastatin 40 milliGRAM(s) Oral at bedtime  calcium acetate 667 milliGRAM(s) Oral three times a day with meals  cloNIDine 0.1 milliGRAM(s) Oral every 12 hours  clopidogrel Tablet 75 milliGRAM(s) Oral daily  dextrose 5%. 1000 milliLiter(s) (100 mL/Hr) IV Continuous <Continuous>  dextrose 5%. 1000 milliLiter(s) (50 mL/Hr) IV Continuous <Continuous>  dextrose 50% Injectable 25 Gram(s) IV Push once  dextrose 50% Injectable 12.5 Gram(s) IV Push once  dextrose 50% Injectable 25 Gram(s) IV Push once  diltiazem    Tablet 60 milliGRAM(s) Oral every 8 hours  epoetin fawad-epbx (RETACRIT) Injectable 69188 Unit(s) IV Push <User Schedule>  glucagon  Injectable 1 milliGRAM(s) IntraMuscular once  imipramine 50 milliGRAM(s) Oral at bedtime  insulin glargine Injectable (LANTUS) 7 Unit(s) SubCutaneous every morning  insulin lispro (ADMELOG) corrective regimen sliding scale   SubCutaneous at bedtime  insulin lispro (ADMELOG) corrective regimen sliding scale   SubCutaneous three times a day before meals  lactobacillus acidophilus 1 Tablet(s) Oral two times a day  metoprolol tartrate 100 milliGRAM(s) Oral every 12 hours  Nephro-elvie 1 Tablet(s) Oral daily  pantoprazole    Tablet 40 milliGRAM(s) Oral before breakfast  piperacillin/tazobactam IVPB.. 3.375 Gram(s) IV Intermittent every 12 hours    MEDICATIONS  (PRN):  acetaminophen     Tablet .. 650 milliGRAM(s) Oral every 6 hours PRN Temp greater or equal to 38C (100.4F), Mild Pain (1 - 3)  aluminum hydroxide/magnesium hydroxide/simethicone Suspension 30 milliLiter(s) Oral every 4 hours PRN Dyspepsia  dextrose Oral Gel 15 Gram(s) Oral once PRN Blood Glucose LESS THAN 70 milliGRAM(s)/deciliter  melatonin 3 milliGRAM(s) Oral at bedtime PRN Insomnia  ondansetron Injectable 4 milliGRAM(s) IV Push every 8 hours PRN Nausea and/or Vomiting  traMADol 50 milliGRAM(s) Oral three times a day PRN Moderate Pain (4 - 6)         Vitals log        ICU Vital Signs Last 24 Hrs  T(C): 37.1 (08 Apr 2022 04:17), Max: 37.1 (07 Apr 2022 06:30)  T(F): 98.8 (08 Apr 2022 04:17), Max: 98.8 (08 Apr 2022 04:17)  HR: 97 (08 Apr 2022 04:17) (74 - 123)  BP: 162/68 (08 Apr 2022 04:17) (138/84 - 179/78)  BP(mean): --  ABP: --  ABP(mean): --  RR: 18 (08 Apr 2022 04:17) (17 - 18)  SpO2: 96% (08 Apr 2022 04:17) (90% - 98%)           Input and Output:  I&O's Detail    06 Apr 2022 07:01  -  07 Apr 2022 07:00  --------------------------------------------------------  IN:  Total IN: 0 mL    OUT:    Other (mL): 200 mL  Total OUT: 200 mL    Total NET: -200 mL      07 Apr 2022 07:01  -  08 Apr 2022 06:29  --------------------------------------------------------  IN:  Total IN: 0 mL    OUT:    Other (mL): 1600 mL  Total OUT: 1600 mL    Total NET: -1600 mL          Lab Data    04-07    135  |  94<L>  |  62<H>  ----------------------------<  250<H>  3.9   |  27  |  7.10<H>    Ca    9.5      07 Apr 2022 16:45  Phos  6.2     04-07  Mg     2.4     04-07              Review of Systems	      Objective     Physical Examination    heart s1s2  lung dec BS  abd soft      Pertinent Lab findings & Imaging      Dexter:  NO   Adequate UO     I&O's Detail    06 Apr 2022 07:01  -  07 Apr 2022 07:00  --------------------------------------------------------  IN:  Total IN: 0 mL    OUT:    Other (mL): 200 mL  Total OUT: 200 mL    Total NET: -200 mL      07 Apr 2022 07:01  -  08 Apr 2022 06:29  --------------------------------------------------------  IN:  Total IN: 0 mL    OUT:    Other (mL): 1600 mL  Total OUT: 1600 mL    Total NET: -1600 mL               Discussed with:     Cultures:	        Radiology

## 2022-04-08 NOTE — PROGRESS NOTE ADULT - ASSESSMENT
72yo female bib ems with sob, pt states she has missed the last 2 rounds of dialysis and is due today, and has been having worsening sob    remains with hypoxemia  ID following - on ABX - on Zosyn -   Vascular eval noted - consideration for ASHD w/u and angiography -   ct chest report - poss pna - pulm edema - nodules - mucus plugging -       pt wishes to have HD  renal - dr alvarez  labs and imaging reviewed  on supplemental o2 support  monitor labs - lytes  HD as per Renal team  GOC documented - Spouse Geoffrey - HCP  pt is full code - wants an attempt at Resuscitation - pt does not want to linger on machines

## 2022-04-08 NOTE — PROGRESS NOTE ADULT - SUBJECTIVE AND OBJECTIVE BOX
Patient is a 71y Female whom presented to the hospital with esrd on hd     PAST MEDICAL & SURGICAL HISTORY:  Diabetes mellitus II    HTN (hypertension)    h/o Anxiety attack    Depression    h/o Myocardial infarct 2007    CAD (coronary artery disease)    h/o Hepatitis A 1969  currently resolved, no symptoms    PAD (peripheral artery disease)    Murmur, cardiac    h/o Smoking  quitted 3/2012    CRF (chronic renal failure), unspecified stage    Dialysis patient    Anemia secondary to renal failure    HTN (hypertension)    coronary stent 2007    s/p Ovarian cyst removal    s/p surgical removal of benign Skin lesion epigastric area        MEDICATIONS  (STANDING):  dextrose 5%. 1000 milliLiter(s) (100 mL/Hr) IV Continuous <Continuous>  dextrose 5%. 1000 milliLiter(s) (50 mL/Hr) IV Continuous <Continuous>  dextrose 50% Injectable 25 Gram(s) IV Push once  dextrose 50% Injectable 12.5 Gram(s) IV Push once  dextrose 50% Injectable 25 Gram(s) IV Push once  glucagon  Injectable 1 milliGRAM(s) IntraMuscular once  insulin lispro (ADMELOG) corrective regimen sliding scale   SubCutaneous three times a day before meals  piperacillin/tazobactam IVPB.. 3.375 Gram(s) IV Intermittent every 12 hours      Allergies    latex (Unknown)  No Known Drug Allergies    Intolerances        SOCIAL HISTORY:  Denies ETOh,Smoking,     FAMILY HISTORY:      REVIEW OF SYSTEMS:    CONSTITUTIONAL: No weakness, fevers or chills  RESPIRATORY: No cough, wheezing, hemoptysis; No shortness of breath  CARDIOVASCULAR: No chest pain or palpitations  GASTROINTESTINAL: No abdominal or epigastric pain. No nausea, vomiting,     No diarrhea or constipation. No melena   GENITOURINARY: No dysuria, frequency or hematuria                                                                         10.3   11.86 )-----------( 359      ( 08 Apr 2022 07:41 )             30.4       CBC Full  -  ( 08 Apr 2022 07:41 )  WBC Count : 11.86 K/uL  RBC Count : 3.27 M/uL  Hemoglobin : 10.3 g/dL  Hematocrit : 30.4 %  Platelet Count - Automated : 359 K/uL  Mean Cell Volume : 93.0 fl  Mean Cell Hemoglobin : 31.5 pg  Mean Cell Hemoglobin Concentration : 33.9 gm/dL  Auto Neutrophil # : x  Auto Lymphocyte # : x  Auto Monocyte # : x  Auto Eosinophil # : x  Auto Basophil # : x  Auto Neutrophil % : x  Auto Lymphocyte % : x  Auto Monocyte % : x  Auto Eosinophil % : x  Auto Basophil % : x      04-08    135  |  96  |  37<H>  ----------------------------<  229<H>  4.0   |  29  |  4.70<H>    Ca    9.3      08 Apr 2022 07:41  Phos  6.2     04-07  Mg     2.4     04-07        CAPILLARY BLOOD GLUCOSE      POCT Blood Glucose.: 248 mg/dL (08 Apr 2022 21:41)  POCT Blood Glucose.: 359 mg/dL (08 Apr 2022 17:49)  POCT Blood Glucose.: 200 mg/dL (08 Apr 2022 13:39)  POCT Blood Glucose.: 215 mg/dL (08 Apr 2022 08:10)      Vital Signs Last 24 Hrs  T(C): 36.3 (08 Apr 2022 20:39), Max: 37.1 (08 Apr 2022 04:17)  T(F): 97.4 (08 Apr 2022 20:39), Max: 98.8 (08 Apr 2022 04:17)  HR: 90 (08 Apr 2022 20:39) (75 - 97)  BP: 165/70 (08 Apr 2022 20:39) (161/78 - 177/65)  BP(mean): --  RR: 17 (08 Apr 2022 20:39) (17 - 18)  SpO2: 94% (08 Apr 2022 20:39) (93% - 96%)                          PHYSICAL EXAM:    Constitutional: NAD  HEENT: conjunctive   clear   Neck:  No JVD  Respiratory: decrease bs b/l   Cardiovascular: S1 and S2  Gastrointestinal: BS+, soft, NT/ND  Extremities: No peripheral edema

## 2022-04-08 NOTE — PROGRESS NOTE ADULT - NUTRITIONAL ASSESSMENT
This patient has been assessed with a concern for Malnutrition and has been determined to have a diagnosis/diagnoses of Severe protein-calorie malnutrition.    This patient is being managed with:   Diet Consistent Carbohydrate Renal w/Evening Snack-  Easy to Chew (EASYTOCHEW)  Supplement Feeding Modality:  Oral  Nepro Cans or Servings Per Day:  1       Frequency:  Daily  Entered: Apr 6 2022 12:14PM

## 2022-04-08 NOTE — PROGRESS NOTE ADULT - ASSESSMENT
esrd- admitted with sob- missed dialysis x2  hyperkalemia- improved  chf  k 4.8  ashd- s/p coronary stent  s/p cabg  hypertension  dyslipidemia  pvd- s/p bypass left leg  dialysis as per renal asap- discussed with pcp and renal  spoke to  4/5  to have hd again  had paf - started on iv cardizem  started on eliquis for paf risks and benefits discussed with pt and   pt is in rsr with cardizem and lopressor  4/8

## 2022-04-08 NOTE — PROGRESS NOTE ADULT - SUBJECTIVE AND OBJECTIVE BOX
Patient is a 71y Female with a known history of :  Fluid overload [E87.70]    ESRD on dialysis [N18.6]    Poor compliance [Z91.19]    Hyperkalemia [E87.5]    DM (diabetes mellitus) [E11.9]    HTN (hypertension) [I10]    CAD (coronary artery disease) [I25.10]    Prophylactic measure [Z29.9]    Foot infection [L08.9]    Atrial fibrillation with tachycardic ventricular rate [I48.91]    Diarrhea [R19.7]    PAD (peripheral artery disease) [I73.9]      HPI:  72yo female bib ems with sob, pt states she has missed the last 2 rounds of dialysis and is due today, and has been having worsening sob, no cough, fever, chills, no chest pain no other complaints .Found to have hyperkalemia Admitted  to telemetry unit for monitoring , send 3 sets of cardiac enzymes to rule out acute coronary event, obtain ECHO to evaluate LVEF, cardiology consult  ,continue current management, O2 supply, anticoagulation plan as per cardiology consult Nephrology consult stat requested ,management d/w Dr Perez and  Palliative care consult requested ,to discuss advance directives and complete MOLST  (05 Apr 2022 07:22)      REVIEW OF SYSTEMS:    CONSTITUTIONAL: No fever, weight loss, or fatigue  EYES: No eye pain, visual disturbances, or discharge  ENMT:  No difficulty hearing, tinnitus, vertigo; No sinus or throat pain  NECK: No pain or stiffness  BREASTS: No pain, masses, or nipple discharge  RESPIRATORY: No cough, wheezing, chills or hemoptysis; No shortness of breath  CARDIOVASCULAR: No chest pain, palpitations, dizziness, or leg swelling  GASTROINTESTINAL: No abdominal or epigastric pain. No nausea, vomiting, or hematemesis; No diarrhea or constipation. No melena or hematochezia.  GENITOURINARY: No dysuria, frequency, hematuria, or incontinence  NEUROLOGICAL: No headaches, memory loss, loss of strength, numbness, or tremors  SKIN: No itching, burning, rashes, or lesions   LYMPH NODES: No enlarged glands  ENDOCRINE: No heat or cold intolerance; No hair loss  MUSCULOSKELETAL: No joint pain or swelling; No muscle, back, or extremity pain  PSYCHIATRIC: No depression, anxiety, mood swings, or difficulty sleeping  HEME/LYMPH: No easy bruising, or bleeding gums  ALLERGY AND IMMUNOLOGIC: No hives or eczema    MEDICATIONS  (STANDING):  amLODIPine   Tablet 7.5 milliGRAM(s) Oral every 24 hours  apixaban 2.5 milliGRAM(s) Oral every 12 hours  aspirin enteric coated 81 milliGRAM(s) Oral daily  atorvastatin 40 milliGRAM(s) Oral at bedtime  calcium acetate 667 milliGRAM(s) Oral three times a day with meals  cloNIDine 0.1 milliGRAM(s) Oral every 12 hours  clopidogrel Tablet 75 milliGRAM(s) Oral daily  dextrose 5%. 1000 milliLiter(s) (100 mL/Hr) IV Continuous <Continuous>  dextrose 5%. 1000 milliLiter(s) (50 mL/Hr) IV Continuous <Continuous>  dextrose 50% Injectable 25 Gram(s) IV Push once  dextrose 50% Injectable 12.5 Gram(s) IV Push once  dextrose 50% Injectable 25 Gram(s) IV Push once  diltiazem    Tablet 60 milliGRAM(s) Oral every 8 hours  epoetin fawad-epbx (RETACRIT) Injectable 93531 Unit(s) IV Push <User Schedule>  glucagon  Injectable 1 milliGRAM(s) IntraMuscular once  imipramine 50 milliGRAM(s) Oral at bedtime  insulin glargine Injectable (LANTUS) 7 Unit(s) SubCutaneous every morning  insulin lispro (ADMELOG) corrective regimen sliding scale   SubCutaneous at bedtime  insulin lispro (ADMELOG) corrective regimen sliding scale   SubCutaneous three times a day before meals  lactobacillus acidophilus 1 Tablet(s) Oral two times a day  metoprolol tartrate 100 milliGRAM(s) Oral every 12 hours  Nephro-elvie 1 Tablet(s) Oral daily  pantoprazole    Tablet 40 milliGRAM(s) Oral before breakfast  piperacillin/tazobactam IVPB.. 3.375 Gram(s) IV Intermittent every 12 hours    MEDICATIONS  (PRN):  acetaminophen     Tablet .. 650 milliGRAM(s) Oral every 6 hours PRN Temp greater or equal to 38C (100.4F), Mild Pain (1 - 3)  aluminum hydroxide/magnesium hydroxide/simethicone Suspension 30 milliLiter(s) Oral every 4 hours PRN Dyspepsia  dextrose Oral Gel 15 Gram(s) Oral once PRN Blood Glucose LESS THAN 70 milliGRAM(s)/deciliter  melatonin 3 milliGRAM(s) Oral at bedtime PRN Insomnia  ondansetron Injectable 4 milliGRAM(s) IV Push every 8 hours PRN Nausea and/or Vomiting  traMADol 50 milliGRAM(s) Oral three times a day PRN Moderate Pain (4 - 6)      ALLERGIES: latex (Unknown)  No Known Drug Allergies      FAMILY HISTORY:      PHYSICAL EXAMINATION:  -----------------------------  T(C): 37.1 (04-08-22 @ 04:17), Max: 37.1 (04-08-22 @ 04:17)  HR: 97 (04-08-22 @ 04:17) (74 - 97)  BP: 162/68 (04-08-22 @ 04:17) (155/69 - 179/78)  RR: 18 (04-08-22 @ 04:17) (17 - 18)  SpO2: 96% (04-08-22 @ 04:17) (90% - 98%)  Wt(kg): --    04-07 @ 07:01  -  04-08 @ 07:00  --------------------------------------------------------  IN:  Total IN: 0 mL    OUT:    Other (mL): 1600 mL  Total OUT: 1600 mL    Total NET: -1600 mL            VITALS  T(C): 37.1 (04-08-22 @ 04:17), Max: 37.1 (04-08-22 @ 04:17)  HR: 97 (04-08-22 @ 04:17) (74 - 97)  BP: 162/68 (04-08-22 @ 04:17) (155/69 - 179/78)  RR: 18 (04-08-22 @ 04:17) (17 - 18)  SpO2: 96% (04-08-22 @ 04:17) (90% - 98%)    Constitutional: well developed, normal appearance, well groomed, well nourished, no deformities and no acute distress.   Eyes: the conjunctiva exhibited no abnormalities and the eyelids demonstrated no xanthelasmas.   HEENT: normal oral mucosa, no oral pallor and no oral cyanosis.   Neck: normal jugular venous A waves present, normal jugular venous V waves present and no jugular venous wilson A waves.   Pulmonary: no respiratory distress, normal respiratory rhythm and effort, no accessory muscle use and lungs were clear to auscultation bilaterally.   Cardiovascular: heart rate and rhythm were normal, normal S1 and S2 and no murmur, gallop, rub, heave or thrill are present.   Abdomen: soft, non-tender, no hepato-splenomegaly and no abdominal mass palpated.   Musculoskeletal: the gait could not be assessed..   Extremities: no clubbing of the fingernails, no localized cyanosis, no petechial hemorrhages and no ischemic changes.   Skin: normal skin color and pigmentation, no rash, no venous stasis, no skin lesions, no skin ulcer and no xanthoma was observed.   Psychiatric: oriented to person, place, and time, the affect was normal, the mood was normal and not feeling anxious.     LABS:   --------  04-08    135  |  96  |  37<H>  ----------------------------<  229<H>  4.0   |  29  |  4.70<H>    Ca    9.3      08 Apr 2022 07:41  Phos  6.2     04-07  Mg     2.4     04-07                           10.3   11.86 )-----------( 359      ( 08 Apr 2022 07:41 )             30.4                 RADIOLOGY:  -----------------    ECG:     ECHO:

## 2022-04-08 NOTE — PROGRESS NOTE ADULT - PROBLEM SELECTOR PLAN 6
Accu-Cheks monitoring and insulin corrective regimen  sliding scale coverage with short acting insulin, add long-acting insulin as needed ,no concentrated sweets diet, serial labs ,HbA1C,education

## 2022-04-08 NOTE — PROGRESS NOTE ADULT - ASSESSMENT
72yo female bib ems with sob, pt states she has missed the last 2 rounds of dialysis and is due today, and has been having worsening sob, no cough, fever, chills, no chest pain no other complaints .Found to have hyperkalemia Admitted  to telemetry unit for monitoring , send 3 sets of cardiac enzymes to rule out acute coronary event, obtain ECHO to evaluate LVEF, cardiology consult  ,continue current management, O2 supply, anticoagulation plan as per cardiology consult Nephrology consult stat requested ,management d/w Dr Perez and  Palliative care consult requested ,to discuss advance directives and complete MOLST  (05 Apr 2022 07:22)        esrd on hd   Excess fluids and waste products will be removed from your blood; your electrolytes will be balanced; your blood pressure will be controlled.      ANEMIA PLAN:  Anemia of chronic disease:  Well controlled by epo  H and H subtherapeutic .  We will continue epo aiming for a HCT of 32-36 %.   We will monitor Iron stores, B12 and RBC folate .      hyperkalemia on low k bath dialysis

## 2022-04-09 LAB
ANION GAP SERPL CALC-SCNC: 12 MMOL/L — SIGNIFICANT CHANGE UP (ref 5–17)
BUN SERPL-MCNC: 33 MG/DL — HIGH (ref 7–23)
CALCIUM SERPL-MCNC: 8.8 MG/DL — SIGNIFICANT CHANGE UP (ref 8.5–10.1)
CHLORIDE SERPL-SCNC: 93 MMOL/L — LOW (ref 96–108)
CO2 SERPL-SCNC: 27 MMOL/L — SIGNIFICANT CHANGE UP (ref 22–31)
CREAT SERPL-MCNC: 4.5 MG/DL — HIGH (ref 0.5–1.3)
EGFR: 10 ML/MIN/1.73M2 — LOW
GLUCOSE SERPL-MCNC: 449 MG/DL — HIGH (ref 70–99)
HCT VFR BLD CALC: 29.5 % — LOW (ref 34.5–45)
HGB BLD-MCNC: 9.5 G/DL — LOW (ref 11.5–15.5)
MCHC RBC-ENTMCNC: 30.5 PG — SIGNIFICANT CHANGE UP (ref 27–34)
MCHC RBC-ENTMCNC: 32.2 GM/DL — SIGNIFICANT CHANGE UP (ref 32–36)
MCV RBC AUTO: 94.9 FL — SIGNIFICANT CHANGE UP (ref 80–100)
NRBC # BLD: 0 /100 WBCS — SIGNIFICANT CHANGE UP (ref 0–0)
PLATELET # BLD AUTO: 403 K/UL — HIGH (ref 150–400)
POTASSIUM SERPL-MCNC: 4 MMOL/L — SIGNIFICANT CHANGE UP (ref 3.5–5.3)
POTASSIUM SERPL-SCNC: 4 MMOL/L — SIGNIFICANT CHANGE UP (ref 3.5–5.3)
RBC # BLD: 3.11 M/UL — LOW (ref 3.8–5.2)
RBC # FLD: 17.6 % — HIGH (ref 10.3–14.5)
SODIUM SERPL-SCNC: 132 MMOL/L — LOW (ref 135–145)
WBC # BLD: 10.66 K/UL — HIGH (ref 3.8–10.5)
WBC # FLD AUTO: 10.66 K/UL — HIGH (ref 3.8–10.5)

## 2022-04-09 PROCEDURE — 93010 ELECTROCARDIOGRAM REPORT: CPT

## 2022-04-09 PROCEDURE — 99232 SBSQ HOSP IP/OBS MODERATE 35: CPT

## 2022-04-09 RX ADMIN — APIXABAN 2.5 MILLIGRAM(S): 2.5 TABLET, FILM COATED ORAL at 22:25

## 2022-04-09 RX ADMIN — Medication 1 TABLET(S): at 13:39

## 2022-04-09 RX ADMIN — Medication 3: at 22:31

## 2022-04-09 RX ADMIN — Medication 100 MILLIGRAM(S): at 05:43

## 2022-04-09 RX ADMIN — Medication 667 MILLIGRAM(S): at 13:39

## 2022-04-09 RX ADMIN — Medication 81 MILLIGRAM(S): at 13:39

## 2022-04-09 RX ADMIN — Medication 4: at 08:33

## 2022-04-09 RX ADMIN — AMLODIPINE BESYLATE 7.5 MILLIGRAM(S): 2.5 TABLET ORAL at 22:25

## 2022-04-09 RX ADMIN — PIPERACILLIN AND TAZOBACTAM 25 GRAM(S): 4; .5 INJECTION, POWDER, LYOPHILIZED, FOR SOLUTION INTRAVENOUS at 17:22

## 2022-04-09 RX ADMIN — Medication 60 MILLIGRAM(S): at 13:39

## 2022-04-09 RX ADMIN — Medication 2: at 17:21

## 2022-04-09 RX ADMIN — PANTOPRAZOLE SODIUM 40 MILLIGRAM(S): 20 TABLET, DELAYED RELEASE ORAL at 05:43

## 2022-04-09 RX ADMIN — Medication 3 MILLIGRAM(S): at 02:41

## 2022-04-09 RX ADMIN — Medication 1 TABLET(S): at 05:43

## 2022-04-09 RX ADMIN — Medication 667 MILLIGRAM(S): at 08:34

## 2022-04-09 RX ADMIN — Medication 50 MILLIGRAM(S): at 22:26

## 2022-04-09 RX ADMIN — Medication 100 MILLIGRAM(S): at 17:21

## 2022-04-09 RX ADMIN — PIPERACILLIN AND TAZOBACTAM 25 GRAM(S): 4; .5 INJECTION, POWDER, LYOPHILIZED, FOR SOLUTION INTRAVENOUS at 05:43

## 2022-04-09 RX ADMIN — CLOPIDOGREL BISULFATE 75 MILLIGRAM(S): 75 TABLET, FILM COATED ORAL at 13:39

## 2022-04-09 RX ADMIN — Medication 0.1 MILLIGRAM(S): at 22:25

## 2022-04-09 RX ADMIN — Medication 667 MILLIGRAM(S): at 17:21

## 2022-04-09 RX ADMIN — Medication 1: at 13:39

## 2022-04-09 RX ADMIN — Medication 0.1 MILLIGRAM(S): at 13:39

## 2022-04-09 RX ADMIN — Medication 60 MILLIGRAM(S): at 22:25

## 2022-04-09 RX ADMIN — Medication 1 TABLET(S): at 17:21

## 2022-04-09 RX ADMIN — Medication 60 MILLIGRAM(S): at 05:43

## 2022-04-09 RX ADMIN — APIXABAN 2.5 MILLIGRAM(S): 2.5 TABLET, FILM COATED ORAL at 13:39

## 2022-04-09 RX ADMIN — ATORVASTATIN CALCIUM 40 MILLIGRAM(S): 80 TABLET, FILM COATED ORAL at 22:25

## 2022-04-09 RX ADMIN — INSULIN GLARGINE 7 UNIT(S): 100 INJECTION, SOLUTION SUBCUTANEOUS at 08:34

## 2022-04-09 RX ADMIN — ERYTHROPOIETIN 10000 UNIT(S): 10000 INJECTION, SOLUTION INTRAVENOUS; SUBCUTANEOUS at 10:24

## 2022-04-09 NOTE — PROGRESS NOTE ADULT - PROBLEM SELECTOR PLAN 1
Pt seen and evaluated  - MRI: Osteomyelitis to LEFT ankle and right hallux distal phallanx  - CTA angio: Severe atherosclerotic changes involving the superficial femoral and popliteal arteries bilaterally with multifocal high-grade stenoses or occlusion  - No clinical signs of infection noted to the 1st & 2nd digit, gangrenous changes noted. No purulence expressed from Left ankle wound. No heel wound noted at this time.  - All wounds dressed with DSD  - Continue IV abx per ID, zosyn    Podiatry to follow Pt seen and evaluated  - MRI: Osteomyelitis to LEFT ankle and right hallux distal phallanx, poss 2nd distal phalanx  - No clinical signs of infection noted to the 1st & 2nd digit, gangrenous changes noted. No purulence expressed from Left ankle wound. No heel wound noted at this time.  - All wounds dressed with DSD  - Continue IV abx per ID, zosyn    Podiatry to follow

## 2022-04-09 NOTE — PROGRESS NOTE ADULT - SUBJECTIVE AND OBJECTIVE BOX
PROGRESS NOTE  Patient is a 71y old  Female who presents with a chief complaint of shortness of breath (09 Apr 2022 08:01)      OVERNIGHT      HPI:  72yo female bib ems with sob, pt states she has missed the last 2 rounds of dialysis and is due today, and has been having worsening sob, no cough, fever, chills, no chest pain no other complaints .Found to have hyperkalemia Admitted  to telemetry unit for monitoring , send 3 sets of cardiac enzymes to rule out acute coronary event, obtain ECHO to evaluate LVEF, cardiology consult  ,continue current management, O2 supply, anticoagulation plan as per cardiology consult Nephrology consult stat requested ,management d/w Dr Perez and  Palliative care consult requested ,to discuss advance directives and complete MOLST  (05 Apr 2022 07:22)    PAST MEDICAL & SURGICAL HISTORY:  Diabetes mellitus II    HTN (hypertension)    h/o Anxiety attack    Depression    h/o Myocardial infarct 2007    CAD (coronary artery disease)    h/o Hepatitis A 1969  currently resolved, no symptoms    PAD (peripheral artery disease)    Murmur, cardiac    h/o Smoking  quitted 3/2012    CRF (chronic renal failure), unspecified stage    Dialysis patient    Anemia secondary to renal failure    HTN (hypertension)    coronary stent 2007    s/p Ovarian cyst removal    s/p surgical removal of benign Skin lesion epigastric area        MEDICATIONS  (STANDING):  amLODIPine   Tablet 7.5 milliGRAM(s) Oral every 24 hours  apixaban 2.5 milliGRAM(s) Oral every 12 hours  aspirin enteric coated 81 milliGRAM(s) Oral daily  atorvastatin 40 milliGRAM(s) Oral at bedtime  calcium acetate 667 milliGRAM(s) Oral three times a day with meals  cloNIDine 0.1 milliGRAM(s) Oral every 12 hours  clopidogrel Tablet 75 milliGRAM(s) Oral daily  dextrose 5%. 1000 milliLiter(s) (100 mL/Hr) IV Continuous <Continuous>  dextrose 5%. 1000 milliLiter(s) (50 mL/Hr) IV Continuous <Continuous>  dextrose 50% Injectable 25 Gram(s) IV Push once  dextrose 50% Injectable 12.5 Gram(s) IV Push once  dextrose 50% Injectable 25 Gram(s) IV Push once  diltiazem    Tablet 60 milliGRAM(s) Oral every 8 hours  epoetin fawad-epbx (RETACRIT) Injectable 42087 Unit(s) IV Push <User Schedule>  glucagon  Injectable 1 milliGRAM(s) IntraMuscular once  imipramine 50 milliGRAM(s) Oral at bedtime  insulin glargine Injectable (LANTUS) 7 Unit(s) SubCutaneous every morning  insulin lispro (ADMELOG) corrective regimen sliding scale   SubCutaneous at bedtime  insulin lispro (ADMELOG) corrective regimen sliding scale   SubCutaneous three times a day before meals  lactobacillus acidophilus 1 Tablet(s) Oral two times a day  metoprolol tartrate 100 milliGRAM(s) Oral every 12 hours  Nephro-elvie 1 Tablet(s) Oral daily  pantoprazole    Tablet 40 milliGRAM(s) Oral before breakfast  piperacillin/tazobactam IVPB.. 3.375 Gram(s) IV Intermittent every 12 hours    MEDICATIONS  (PRN):  acetaminophen     Tablet .. 650 milliGRAM(s) Oral every 6 hours PRN Temp greater or equal to 38C (100.4F), Mild Pain (1 - 3)  aluminum hydroxide/magnesium hydroxide/simethicone Suspension 30 milliLiter(s) Oral every 4 hours PRN Dyspepsia  dextrose Oral Gel 15 Gram(s) Oral once PRN Blood Glucose LESS THAN 70 milliGRAM(s)/deciliter  melatonin 3 milliGRAM(s) Oral at bedtime PRN Insomnia  ondansetron Injectable 4 milliGRAM(s) IV Push every 8 hours PRN Nausea and/or Vomiting  traMADol 50 milliGRAM(s) Oral three times a day PRN Moderate Pain (4 - 6)      OBJECTIVE    T(C): 36.4 (04-09-22 @ 09:57), Max: 36.9 (04-09-22 @ 04:41)  HR: 71 (04-09-22 @ 09:57) (71 - 97)  BP: 150/68 (04-09-22 @ 09:57) (150/68 - 177/65)  RR: 16 (04-09-22 @ 09:57) (16 - 18)  SpO2: 95% (04-09-22 @ 09:57) (91% - 96%)  Wt(kg): --  I&O's Summary    08 Apr 2022 07:01  -  09 Apr 2022 07:00  --------------------------------------------------------  IN: 100 mL / OUT: 500 mL / NET: -400 mL          REVIEW OF SYSTEMS:  CONSTITUTIONAL: No fever, weight loss, or fatigue  EYES: No eye pain, visual disturbances, or discharge  ENMT:   No sinus or throat pain  NECK: No pain or stiffness  RESPIRATORY: No cough, wheezing, chills or hemoptysis; No shortness of breath  CARDIOVASCULAR: No chest pain, palpitations, dizziness, or leg swelling  GASTROINTESTINAL: No abdominal pain. No nausea, vomiting; No diarrhea or constipation. No melena or hematochezia.  GENITOURINARY: No dysuria, frequency, hematuria, or incontinence  NEUROLOGICAL: No headaches, memory loss, loss of strength, numbness, or tremors  SKIN: No itching, burning, rashes, or lesions   MUSCULOSKELETAL: No joint pain or swelling; No muscle, back, or extremity pain    PHYSICAL EXAM:  Appearance: NAD. VS past 24 hrs -as above   HEENT:   Moist oral mucosa. Conjunctiva clear b/l.   Neck : supple  Respiratory: Lungs CTAB.  Gastrointestinal:  Soft, nontender. No rebound. No rigidity. BS present	  Cardiovascular: RRR ,S1S2 present  Neurologic: Non-focal. Moving all extremities.  Extremities: No edema. No erythema. No calf tenderness.  Skin: No rashes, No ecchymoses, No cyanosis.	  wounds ,skin lesions-See skin assesment flow sheet   LABS:                        9.5    10.66 )-----------( 403      ( 09 Apr 2022 08:01 )             29.5     04-09    132<L>  |  93<L>  |  33<H>  ----------------------------<  449<H>  4.0   |  27  |  4.50<H>    Ca    8.8      09 Apr 2022 08:01      CAPILLARY BLOOD GLUCOSE      POCT Blood Glucose.: 344 mg/dL (09 Apr 2022 08:19)  POCT Blood Glucose.: 248 mg/dL (08 Apr 2022 21:41)  POCT Blood Glucose.: 359 mg/dL (08 Apr 2022 17:49)  POCT Blood Glucose.: 200 mg/dL (08 Apr 2022 13:39)          Culture - Blood (collected 05 Apr 2022 09:28)  Source: .Blood Blood-Venous  Preliminary Report (06 Apr 2022 10:01):    No growth to date.    Culture - Blood (collected 05 Apr 2022 09:28)  Source: .Blood Blood-Venous  Preliminary Report (06 Apr 2022 10:01):    No growth to date.    Culture - Tissue with Gram Stain (collected 30 Mar 2022 18:08)  Source: .Tissue Other, Left Medial Ankle  Gram Stain (30 Mar 2022 20:57):    Few polymorphonuclear leukocytes per low power field    Moderate Gram positive cocci in pairs per oil power field    Rare Gram Negative Rods per oil power field  Preliminary Report (31 Mar 2022 18:16):    Few Klebsiella oxytoca/Raoutella ornithinolytica    Few Escherichia coli    Moderate Staphylococcus aureus  Organism: Klebsiella oxytoca /Raoutella ornithinolytica  Escherichia coli  Staphylococcus aureus (01 Apr 2022 16:04)  Organism: Staphylococcus aureus (01 Apr 2022 16:04)  Organism: Escherichia coli (01 Apr 2022 16:04)  Organism: Klebsiella oxytoca /Raoutella ornithinolytica (01 Apr 2022 16:04)      RADIOLOGY & ADDITIONAL TESTS:   reviewed elctronically  ASSESSMENT/PLAN: 	     PROGRESS NOTE  Patient is a 71y old  Female who presents with a chief complaint of shortness of breath (09 Apr 2022 08:01)  Chart and available morning labs /imaging are reviewed electronically , urgent issues addressed . More information  is being added upon completion of rounds , when more information is collected and management discussed with consultants , medical staff and social service/case management on the floor   OVERNIGHT  No new issues reported by medical staff . All above noted Patient is resting in a bed comfortably .Seen in HD UNIT  .No distress noted   HPI:  70yo female bib ems with sob, pt states she has missed the last 2 rounds of dialysis and is due today, and has been having worsening sob, no cough, fever, chills, no chest pain no other complaints .Found to have hyperkalemia Admitted  to telemetry unit for monitoring , send 3 sets of cardiac enzymes to rule out acute coronary event, obtain ECHO to evaluate LVEF, cardiology consult  ,continue current management, O2 supply, anticoagulation plan as per cardiology consult Nephrology consult stat requested ,management d/w Dr Perez and  Palliative care consult requested ,to discuss advance directives and complete MOLST  (05 Apr 2022 07:22)    PAST MEDICAL & SURGICAL HISTORY:  Diabetes mellitus II    HTN (hypertension)    h/o Anxiety attack    Depression    h/o Myocardial infarct 2007    CAD (coronary artery disease)    h/o Hepatitis A 1969  currently resolved, no symptoms    PAD (peripheral artery disease)    Murmur, cardiac    h/o Smoking  quitted 3/2012    CRF (chronic renal failure), unspecified stage    Dialysis patient    Anemia secondary to renal failure    HTN (hypertension)    coronary stent 2007    s/p Ovarian cyst removal    s/p surgical removal of benign Skin lesion epigastric area        MEDICATIONS  (STANDING):  amLODIPine   Tablet 7.5 milliGRAM(s) Oral every 24 hours  apixaban 2.5 milliGRAM(s) Oral every 12 hours  aspirin enteric coated 81 milliGRAM(s) Oral daily  atorvastatin 40 milliGRAM(s) Oral at bedtime  calcium acetate 667 milliGRAM(s) Oral three times a day with meals  cloNIDine 0.1 milliGRAM(s) Oral every 12 hours  clopidogrel Tablet 75 milliGRAM(s) Oral daily  dextrose 5%. 1000 milliLiter(s) (100 mL/Hr) IV Continuous <Continuous>  dextrose 5%. 1000 milliLiter(s) (50 mL/Hr) IV Continuous <Continuous>  dextrose 50% Injectable 25 Gram(s) IV Push once  dextrose 50% Injectable 12.5 Gram(s) IV Push once  dextrose 50% Injectable 25 Gram(s) IV Push once  diltiazem    Tablet 60 milliGRAM(s) Oral every 8 hours  epoetin fawad-epbx (RETACRIT) Injectable 03213 Unit(s) IV Push <User Schedule>  glucagon  Injectable 1 milliGRAM(s) IntraMuscular once  imipramine 50 milliGRAM(s) Oral at bedtime  insulin glargine Injectable (LANTUS) 7 Unit(s) SubCutaneous every morning  insulin lispro (ADMELOG) corrective regimen sliding scale   SubCutaneous at bedtime  insulin lispro (ADMELOG) corrective regimen sliding scale   SubCutaneous three times a day before meals  lactobacillus acidophilus 1 Tablet(s) Oral two times a day  metoprolol tartrate 100 milliGRAM(s) Oral every 12 hours  Nephro-elvie 1 Tablet(s) Oral daily  pantoprazole    Tablet 40 milliGRAM(s) Oral before breakfast  piperacillin/tazobactam IVPB.. 3.375 Gram(s) IV Intermittent every 12 hours    MEDICATIONS  (PRN):  acetaminophen     Tablet .. 650 milliGRAM(s) Oral every 6 hours PRN Temp greater or equal to 38C (100.4F), Mild Pain (1 - 3)  aluminum hydroxide/magnesium hydroxide/simethicone Suspension 30 milliLiter(s) Oral every 4 hours PRN Dyspepsia  dextrose Oral Gel 15 Gram(s) Oral once PRN Blood Glucose LESS THAN 70 milliGRAM(s)/deciliter  melatonin 3 milliGRAM(s) Oral at bedtime PRN Insomnia  ondansetron Injectable 4 milliGRAM(s) IV Push every 8 hours PRN Nausea and/or Vomiting  traMADol 50 milliGRAM(s) Oral three times a day PRN Moderate Pain (4 - 6)      OBJECTIVE    T(C): 36.4 (04-09-22 @ 09:57), Max: 36.9 (04-09-22 @ 04:41)  HR: 71 (04-09-22 @ 09:57) (71 - 97)  BP: 150/68 (04-09-22 @ 09:57) (150/68 - 177/65)  RR: 16 (04-09-22 @ 09:57) (16 - 18)  SpO2: 95% (04-09-22 @ 09:57) (91% - 96%)  Wt(kg): --  I&O's Summary    08 Apr 2022 07:01  -  09 Apr 2022 07:00  --------------------------------------------------------  IN: 100 mL / OUT: 500 mL / NET: -400 mL          REVIEW OF SYSTEMS:  CONSTITUTIONAL: No fever, weight loss, or fatigue  EYES: No eye pain, visual disturbances, or discharge  ENMT:   No sinus or throat pain  NECK: No pain or stiffness  RESPIRATORY: No cough, wheezing, chills or hemoptysis; No shortness of breath  CARDIOVASCULAR: No chest pain, palpitations, dizziness, or leg swelling  GASTROINTESTINAL: No abdominal pain. No nausea, vomiting; No diarrhea or constipation. No melena or hematochezia.  GENITOURINARY: No dysuria, frequency, hematuria, or incontinence  NEUROLOGICAL: No headaches, memory loss, loss of strength, numbness, or tremors  SKIN: No itching, burning, rashes, or lesions   MUSCULOSKELETAL: No joint pain or swelling; No muscle, back, or extremity pain    PHYSICAL EXAM:  Appearance: NAD. VS past 24 hrs -as above   HEENT:   Moist oral mucosa. Conjunctiva clear b/l.   Neck : supple  Respiratory: Lungs CTAB.  Gastrointestinal:  Soft, nontender. No rebound. No rigidity. BS present	  Cardiovascular: RRR ,S1S2 present  Neurologic: Non-focal. Moving all extremities.  Extremities: No edema. No erythema. No calf tenderness.  Skin: No rashes, No ecchymoses, No cyanosis.	  wounds ,skin lesions-See skin assesment flow sheet   LABS:                        9.5    10.66 )-----------( 403      ( 09 Apr 2022 08:01 )             29.5     04-09    132<L>  |  93<L>  |  33<H>  ----------------------------<  449<H>  4.0   |  27  |  4.50<H>    Ca    8.8      09 Apr 2022 08:01      CAPILLARY BLOOD GLUCOSE      POCT Blood Glucose.: 344 mg/dL (09 Apr 2022 08:19)  POCT Blood Glucose.: 248 mg/dL (08 Apr 2022 21:41)  POCT Blood Glucose.: 359 mg/dL (08 Apr 2022 17:49)  POCT Blood Glucose.: 200 mg/dL (08 Apr 2022 13:39)          Culture - Blood (collected 05 Apr 2022 09:28)  Source: .Blood Blood-Venous  Preliminary Report (06 Apr 2022 10:01):    No growth to date.    Culture - Blood (collected 05 Apr 2022 09:28)  Source: .Blood Blood-Venous  Preliminary Report (06 Apr 2022 10:01):    No growth to date.    Culture - Tissue with Gram Stain (collected 30 Mar 2022 18:08)  Source: .Tissue Other, Left Medial Ankle  Gram Stain (30 Mar 2022 20:57):    Few polymorphonuclear leukocytes per low power field    Moderate Gram positive cocci in pairs per oil power field    Rare Gram Negative Rods per oil power field  Preliminary Report (31 Mar 2022 18:16):    Few Klebsiella oxytoca/Raoutella ornithinolytica    Few Escherichia coli    Moderate Staphylococcus aureus  Organism: Klebsiella oxytoca /Raoutella ornithinolytica  Escherichia coli  Staphylococcus aureus (01 Apr 2022 16:04)  Organism: Staphylococcus aureus (01 Apr 2022 16:04)  Organism: Escherichia coli (01 Apr 2022 16:04)  Organism: Klebsiella oxytoca /Raoutella ornithinolytica (01 Apr 2022 16:04)      RADIOLOGY & ADDITIONAL TESTS:   reviewed elctronically  ASSESSMENT/PLAN: 	  25 minutes aggregate time was spent on this visit, 50% visit time spent in care co-ordination with other attendings and counselling patient .I have discussed care plan with patient / HCP/family member ,who expressed understanding of problems treatment and their effect and side effects, alternatives in details. I have asked if they have any questions and concerns and appropriately addressed them to best of my ability.

## 2022-04-09 NOTE — PROGRESS NOTE ADULT - ASSESSMENT
70yo female bib ems with sob, pt states she has missed the last 2 rounds of dialysis and is due today, and has been having worsening sob    remains with hypoxemia - weaned down to NC o2 support  ID following - on ABX - on Zosyn -   Vascular eval noted - consideration for ASHD w/u and angiography -   ct chest report - poss pna - pulm edema - nodules - mucus plugging -       pt wishes to have HD  renal - dr alvarez  labs and imaging reviewed  on supplemental o2 support  monitor labs - lytes  HD as per Renal team  GOC documented - Spouse Geoffrey - HCP  pt is full code - wants an attempt at Resuscitation - pt does not want to linger on machines

## 2022-04-09 NOTE — PROGRESS NOTE ADULT - ASSESSMENT
Right foot 1& 2nd digit gangrenous changes, Right medial malleolus ulcer Right foot 1& 2nd digit gangrenous changes, Left medial malleolus ulcer

## 2022-04-09 NOTE — PROGRESS NOTE ADULT - SUBJECTIVE AND OBJECTIVE BOX
Patient is a 71y Female with a known history of :  Fluid overload [E87.70]    ESRD on dialysis [N18.6]    Poor compliance [Z91.19]    Hyperkalemia [E87.5]    DM (diabetes mellitus) [E11.9]    HTN (hypertension) [I10]    CAD (coronary artery disease) [I25.10]    Prophylactic measure [Z29.9]    Foot infection [L08.9]    Atrial fibrillation with tachycardic ventricular rate [I48.91]    Diarrhea [R19.7]    PAD (peripheral artery disease) [I73.9]      HPI:  72yo female bib ems with sob, pt states she has missed the last 2 rounds of dialysis and is due today, and has been having worsening sob, no cough, fever, chills, no chest pain no other complaints .Found to have hyperkalemia Admitted  to telemetry unit for monitoring , send 3 sets of cardiac enzymes to rule out acute coronary event, obtain ECHO to evaluate LVEF, cardiology consult  ,continue current management, O2 supply, anticoagulation plan as per cardiology consult Nephrology consult stat requested ,management d/w Dr Perez and  Palliative care consult requested ,to discuss advance directives and complete MOLST  (05 Apr 2022 07:22)      REVIEW OF SYSTEMS:    CONSTITUTIONAL: No fever, weight loss, or fatigue  EYES: No eye pain, visual disturbances, or discharge  ENMT:  No difficulty hearing, tinnitus, vertigo; No sinus or throat pain  NECK: No pain or stiffness  BREASTS: No pain, masses, or nipple discharge  RESPIRATORY: No cough, wheezing, chills or hemoptysis; No shortness of breath  CARDIOVASCULAR: No chest pain, palpitations, dizziness, or leg swelling  GASTROINTESTINAL: No abdominal or epigastric pain. No nausea, vomiting, or hematemesis; No diarrhea or constipation. No melena or hematochezia.  GENITOURINARY: No dysuria, frequency, hematuria, or incontinence  NEUROLOGICAL: No headaches, memory loss, loss of strength, numbness, or tremors  SKIN: No itching, burning, rashes, or lesions   LYMPH NODES: No enlarged glands  ENDOCRINE: No heat or cold intolerance; No hair loss  MUSCULOSKELETAL: No joint pain or swelling; No muscle, back, or extremity pain  PSYCHIATRIC: No depression, anxiety, mood swings, or difficulty sleeping  HEME/LYMPH: No easy bruising, or bleeding gums  ALLERGY AND IMMUNOLOGIC: No hives or eczema    MEDICATIONS  (STANDING):  amLODIPine   Tablet 7.5 milliGRAM(s) Oral every 24 hours  apixaban 2.5 milliGRAM(s) Oral every 12 hours  aspirin enteric coated 81 milliGRAM(s) Oral daily  atorvastatin 40 milliGRAM(s) Oral at bedtime  calcium acetate 667 milliGRAM(s) Oral three times a day with meals  cloNIDine 0.1 milliGRAM(s) Oral every 12 hours  clopidogrel Tablet 75 milliGRAM(s) Oral daily  dextrose 5%. 1000 milliLiter(s) (100 mL/Hr) IV Continuous <Continuous>  dextrose 5%. 1000 milliLiter(s) (50 mL/Hr) IV Continuous <Continuous>  dextrose 50% Injectable 25 Gram(s) IV Push once  dextrose 50% Injectable 12.5 Gram(s) IV Push once  dextrose 50% Injectable 25 Gram(s) IV Push once  diltiazem    Tablet 60 milliGRAM(s) Oral every 8 hours  epoetin fawad-epbx (RETACRIT) Injectable 22551 Unit(s) IV Push <User Schedule>  glucagon  Injectable 1 milliGRAM(s) IntraMuscular once  imipramine 50 milliGRAM(s) Oral at bedtime  insulin glargine Injectable (LANTUS) 7 Unit(s) SubCutaneous every morning  insulin lispro (ADMELOG) corrective regimen sliding scale   SubCutaneous at bedtime  insulin lispro (ADMELOG) corrective regimen sliding scale   SubCutaneous three times a day before meals  lactobacillus acidophilus 1 Tablet(s) Oral two times a day  metoprolol tartrate 100 milliGRAM(s) Oral every 12 hours  Nephro-elvie 1 Tablet(s) Oral daily  pantoprazole    Tablet 40 milliGRAM(s) Oral before breakfast  piperacillin/tazobactam IVPB.. 3.375 Gram(s) IV Intermittent every 12 hours    MEDICATIONS  (PRN):  acetaminophen     Tablet .. 650 milliGRAM(s) Oral every 6 hours PRN Temp greater or equal to 38C (100.4F), Mild Pain (1 - 3)  aluminum hydroxide/magnesium hydroxide/simethicone Suspension 30 milliLiter(s) Oral every 4 hours PRN Dyspepsia  dextrose Oral Gel 15 Gram(s) Oral once PRN Blood Glucose LESS THAN 70 milliGRAM(s)/deciliter  melatonin 3 milliGRAM(s) Oral at bedtime PRN Insomnia  ondansetron Injectable 4 milliGRAM(s) IV Push every 8 hours PRN Nausea and/or Vomiting  traMADol 50 milliGRAM(s) Oral three times a day PRN Moderate Pain (4 - 6)      ALLERGIES: latex (Unknown)  No Known Drug Allergies      FAMILY HISTORY:      PHYSICAL EXAMINATION:  -----------------------------  T(C): 36.4 (04-09-22 @ 09:57), Max: 36.9 (04-09-22 @ 04:41)  HR: 71 (04-09-22 @ 09:57) (71 - 97)  BP: 150/68 (04-09-22 @ 09:57) (150/68 - 177/65)  RR: 16 (04-09-22 @ 09:57) (16 - 18)  SpO2: 95% (04-09-22 @ 09:57) (91% - 96%)  Wt(kg): --    04-08 @ 07:01  -  04-09 @ 07:00  --------------------------------------------------------  IN:    IV PiggyBack: 100 mL  Total IN: 100 mL    OUT:    Other (mL): 500 mL    Voided (mL): 0 mL  Total OUT: 500 mL    Total NET: -400 mL            VITALS  T(C): 36.4 (04-09-22 @ 09:57), Max: 36.9 (04-09-22 @ 04:41)  HR: 71 (04-09-22 @ 09:57) (71 - 97)  BP: 150/68 (04-09-22 @ 09:57) (150/68 - 177/65)  RR: 16 (04-09-22 @ 09:57) (16 - 18)  SpO2: 95% (04-09-22 @ 09:57) (91% - 96%)    Constitutional: well developed, normal appearance, well groomed, well nourished, no deformities and no acute distress.   Eyes: the conjunctiva exhibited no abnormalities and the eyelids demonstrated no xanthelasmas.   HEENT: normal oral mucosa, no oral pallor and no oral cyanosis.   Neck: normal jugular venous A waves present, normal jugular venous V waves present and no jugular venous wilson A waves.   Pulmonary: no respiratory distress, normal respiratory rhythm and effort, no accessory muscle use and lungs were clear to auscultation bilaterally.   Cardiovascular: heart rate and rhythm were normal, normal S1 and S2 and no murmur, gallop, rub, heave or thrill are present.   Abdomen: soft, non-tender, no hepato-splenomegaly and no abdominal mass palpated.   Musculoskeletal: the gait could not be assessed..   Extremities: no clubbing of the fingernails, no localized cyanosis, no petechial hemorrhages and no ischemic changes.   Skin: normal skin color and pigmentation, no rash, no venous stasis, no skin lesions, no skin ulcer and no xanthoma was observed.   Psychiatric: oriented to person, place, and time, the affect was normal, the mood was normal and not feeling anxious.     LABS:   --------  04-09    132<L>  |  93<L>  |  33<H>  ----------------------------<  449<H>  4.0   |  27  |  4.50<H>    Ca    8.8      09 Apr 2022 08:01                           9.5    10.66 )-----------( 403      ( 09 Apr 2022 08:01 )             29.5                 RADIOLOGY:  -----------------    ECG:     ECHO:

## 2022-04-09 NOTE — PROGRESS NOTE ADULT - SUBJECTIVE AND OBJECTIVE BOX
HPI:  72yo female bib ems with sob, pt states she has missed the last 2 rounds of dialysis and is due today, and has been having worsening sob, no cough, fever, chills, no chest pain no other complaints Found to have hyperkalemia Admitted  to telemetry unit for monitoring. Pt seen this morning for Right 2nd toe infection and left ankle ulcer. Pt AAOX3 in NAD. Pt denies and constitutional symptoms.     REVIEW OF SYSTEMS    PAST MEDICAL & SURGICAL HISTORY:  Diabetes mellitus II    HTN (hypertension)    h/o Anxiety attack    Depression    h/o Myocardial infarct 2007    CAD (coronary artery disease)    h/o Hepatitis A 1969  currently resolved, no symptoms    PAD (peripheral artery disease)    Murmur, cardiac    h/o Smoking  quitted 3/2012    CRF (chronic renal failure), unspecified stage    Dialysis patient    Anemia secondary to renal failure    HTN (hypertension)    coronary stent 2007    s/p Ovarian cyst removal    s/p surgical removal of benign Skin lesion epigastric area        Allergies    latex (Unknown)  No Known Drug Allergies    Intolerances    Social History: former cigarette smoker  no etoh or tobacco reported (05 Apr 2022 07:22)      MEDICATIONS  (STANDING):  amLODIPine   Tablet 5 milliGRAM(s) Oral daily  aspirin enteric coated 81 milliGRAM(s) Oral daily  atorvastatin 40 milliGRAM(s) Oral at bedtime  calcium acetate 667 milliGRAM(s) Oral three times a day with meals  cloNIDine 0.1 milliGRAM(s) Oral at bedtime  clopidogrel Tablet 75 milliGRAM(s) Oral daily  dextrose 5%. 1000 milliLiter(s) (100 mL/Hr) IV Continuous <Continuous>  dextrose 5%. 1000 milliLiter(s) (50 mL/Hr) IV Continuous <Continuous>  dextrose 50% Injectable 25 Gram(s) IV Push once  dextrose 50% Injectable 12.5 Gram(s) IV Push once  dextrose 50% Injectable 25 Gram(s) IV Push once  epoetin fawad-epbx (RETACRIT) Injectable 16396 Unit(s) IV Push <User Schedule>  glucagon  Injectable 1 milliGRAM(s) IntraMuscular once  heparin   Injectable 5000 Unit(s) SubCutaneous every 12 hours  insulin lispro (ADMELOG) corrective regimen sliding scale   SubCutaneous three times a day before meals  insulin regular  human recombinant 10 Unit(s) SubCutaneous Once  metoprolol succinate  milliGRAM(s) Oral daily  multivitamin 1 Tablet(s) Oral daily  pantoprazole    Tablet 40 milliGRAM(s) Oral before breakfast  piperacillin/tazobactam IVPB.. 3.375 Gram(s) IV Intermittent every 12 hours    MEDICATIONS  (PRN):  acetaminophen     Tablet .. 650 milliGRAM(s) Oral every 6 hours PRN Temp greater or equal to 38C (100.4F), Mild Pain (1 - 3)  aluminum hydroxide/magnesium hydroxide/simethicone Suspension 30 milliLiter(s) Oral every 4 hours PRN Dyspepsia  dextrose Oral Gel 15 Gram(s) Oral once PRN Blood Glucose LESS THAN 70 milliGRAM(s)/deciliter  melatonin 3 milliGRAM(s) Oral at bedtime PRN Insomnia  ondansetron Injectable 4 milliGRAM(s) IV Push every 8 hours PRN Nausea and/or Vomiting      PHYSICAL EXAM:  Vascular: +1 pitting edema noted b/l. DP palpable bilaterally, Pt non- palpable bilaterally. Capillary refill 5 seconds, absent to the right 1st and second digits. Digital hair absent bilaterally  Neurological: Light touch sensation intact bilaterally  Musculoskeletal: 5/5 strength in all quadrants bilaterally, AJ & STJ ROM intact  Dermatological: Gangrenous changes noted to the right foot 1st and 2nd digit. 2 x 2 ulceration noted to the Left medial malleolus,, periwound erythema improving, no fluctuance. Probes to bone, no periwound erythema, no malodor, no proximal streaking at this time    ICU Vital Signs Last 24 Hrs  T(C): 36.6 (09 Apr 2022 12:57), Max: 36.9 (09 Apr 2022 04:41)  T(F): 97.9 (09 Apr 2022 12:57), Max: 98.5 (09 Apr 2022 04:41)  HR: 78 (09 Apr 2022 12:57) (71 - 97)  BP: 154/66 (09 Apr 2022 12:57) (150/68 - 177/65)  RR: 14 (09 Apr 2022 12:57) (14 - 18)  SpO2: 100% (09 Apr 2022 12:57) (91% - 100%)    CBC Full  -  ( 09 Apr 2022 08:01 )  WBC Count : 10.66 K/uL  RBC Count : 3.11 M/uL  Hemoglobin : 9.5 g/dL  Hematocrit : 29.5 %  Platelet Count - Automated : 403 K/uL  Mean Cell Volume : 94.9 fl  Mean Cell Hemoglobin : 30.5 pg  Mean Cell Hemoglobin Concentration : 32.2 gm/dL    CTA Angio fot b/L LE  IMPRESSION:    Patent bifemoral and left superficial femoral to tibioperoneal trunk bypass grafts.    Long segment occlusion of the right common and external iliac arteries with reconstitution at the right superficial femoral artery.    Severe atherosclerotic changes involving the superficial femoral and popliteal arteries bilaterally with multifocal high-grade stenoses or occlusions.    Heavily calcified calf arteries which cannot be assessed secondary to the calcified plaque.    Given the degree of calcified plaque, an MRA may be able to better evaluate the lower extremity arteries.    --- End of Report ---        ACC: [id number] EXAM: MR ANKLE LT    PROCEDURE DATE: 04/08/2022        INTERPRETATION: History: Nonhealing wound overlying the medial malleolus, evaluate for osteomyelitis    Technique: Multiplanar and multisequence MRI images were obtained through the left ankle without contrast.    Comparison: Comparison radiographs dated 3/30/2022    Findings:    Evaluation limited due to motion.    Soft tissue ulcer overlying the medial malleolus. Osseous edema and T1 marrow signal abnormality within the subjacent medial malleolus, most consistent with osteomyelitis. No acute fracture.    Joints are unremarkable.    Chronic scar remodeling of the lateral ligamentous complex. Chronic scar remodeling of the deltoid ligamentous complex.    Mild tenosynovitis of the posterior tibialis tendon in the region of the ulcer. Possible retromalleolar longitudinal split tear of the peroneus brevis tendon with reconstitution distally, but evaluation is limited due to motion. Mild mid to distal Achilles tendinosis. Moderate plantar fasciitis.    Impression:    Limited evaluation due to motion artifact.    Soft tissue ulcer overlying the medial malleolus with findings most consistent with osteomyelitis of the underlying medial malleolus.    In the region of the ulcer, there is mild tenosynovitis of the posterior tibialis tendon, which may be reactive or infectious in etiology.    Possible retromalleolar longitudinal split tear of the peroneus brevis with reconstitution distally.    Other findings as above.    --- End of Report ---      ACC: [id number] EXAM: US DPLX LWR EXT ARTS COMPL BI    PROCEDURE DATE: 04/06/2022        INTERPRETATION: CLINICAL INDICATION: Lower extremity wounds. Evaluate for lower extremity arterial pathology.    EXAMINATION: Bilateral lower extremity arterial duplex ultrasound    COMPARISON: None    FINDINGS:    Limited evaluation as noted by technologist due to patient motion.    Bilateral lower extremity atheromatous disease.    Progressively delayed upstroke of right common femoral, superficial femoral, and popliteal arteries noted. The possibility of inflow significant stenosis cannot be excluded. Trifurcation arteries are not visualized due to patient motion. Right dorsalis pedis artery could not be assessed due to overlying bandage material.    Incompletely imaged bypass graft of left lower extremity, which demonstrates antegrade flow. The full extent of the bypass graft is not adequately assessed on this study. The left superficial femoral artery demonstrates minimal flow. Trifurcation arteries are not visualized due to patient motion.    IMPRESSION:    Markedly limited study.    Concern for right lower iliac artery extremity inflow disease.    Incompletely visualized bypass graft of left lower extremity with antegrade flow.    Correlation with CTA with lower extremity runoff is recommended for better characterization of lower extremity arterial vasculature.    Phone message was left with Dr. Larson's service.    --- End of Report ---      ACC: [id number] EXAM: US DPLX LWR EXT VEINS COMPL BI    PROCEDURE DATE: 04/06/2022    INTERPRETATION: CLINICAL INFORMATION: Nonhealing lower extremity wounds    COMPARISON: None available.    TECHNIQUE: Duplex sonography of the BILATERAL LOWER extremity veins with color and spectral Doppler, with and without compression.    FINDINGS:    RIGHT:  Normal compressibility of the RIGHT common femoral, femoral and popliteal veins.  Doppler examination shows normal spontaneous and phasic flow.    The right posterior tibial veins are patent and free of thrombus.    The right peroneal veins were not diagnostically imaged.    LEFT:  Normal compressibility of the LEFT common femoral, femoral and popliteal veins.  Doppler examination shows normal spontaneous and phasic flow.    The left posterior tibial and peroneal veins were not diagnostically imaged.    IMPRESSION:  No evidence of deep venous thrombosis in either lower extremity.      ACC: [id number] EXAM: XR FOOT COMP MIN 3 VIEWS BI    PROCEDURE DATE: 03/30/2022    INTERPRETATION: RIGHT and LEFT foot    CLINICAL INFORMATION: Infection of RIGHT second toe and heel ulcerations.    . TECHNIQUE: RIGHT LEFT AP,lateral and oblique foot views. 6 views    FINDINGS:  LEFT foot:  Stable posterior heel ulceration without subcutaneous air  The bones and joint spaces are intact. No radiographic evidence of osteomyelitis.  No fracture or dislocation.  Diffuse arterial vascular wall calcifications. .    RIGHT foot:  IMPRESSION:  No acute radiographic osseous pathology. No radiographic evidence of osteomyelitis.    If osteomyelitis is considered despite conservative therapy, and soft tissue / bone infection requires further assessment, follow-up MRI recommended.    --- End of Report ---

## 2022-04-09 NOTE — PROGRESS NOTE ADULT - PROBLEM SELECTOR PLAN 2
- CTA angio: Severe atherosclerotic changes involving the superficial femoral and popliteal arteries bilaterally with multifocal high-grade stenoses or occlusion  - Arterial duplex: Bilateral lower extremity atheromatous disease.  Progressively delayed upstroke of right common femoral, superficial femoral, and popliteal arteries noted. The possibility of inflow significant stenosis cannot be excluded. Trifurcation arteries are not visualized due to patient motion. Right dorsalis pedis artery could not be assessed due to overlying bandage material.  - Incompletely imaged bypass graft of left lower extremity, which demonstrates antegrade flow. The full extent of the bypass graft is not adequately assessed on this study. The left superficial femoral artery demonstrates minimal flow. Trifurcation arteries are not visualized due to patient motion.

## 2022-04-09 NOTE — PROGRESS NOTE ADULT - SUBJECTIVE AND OBJECTIVE BOX
Date/Time Patient Seen:  		  Referring MD:   Data Reviewed	       Patient is a 71y old  Female who presents with a chief complaint of shortness of breath (08 Apr 2022 15:52)      Subjective/HPI     PAST MEDICAL & SURGICAL HISTORY:  Diabetes mellitus II    HTN (hypertension)    h/o Anxiety attack    Depression    h/o Myocardial infarct 2007    CAD (coronary artery disease)    CAD (coronary artery disease)    h/o Hepatitis A 1969  currently resolved, no symptoms    PAD (peripheral artery disease)    Murmur, cardiac    h/o Smoking  quitted 3/2012    CRF (chronic renal failure), unspecified stage    Dialysis patient    Anemia secondary to renal failure    HTN (hypertension)    coronary stent 2007    s/p Ovarian cyst removal    s/p surgical removal of benign Skin lesion epigastric area          Medication list         MEDICATIONS  (STANDING):  amLODIPine   Tablet 7.5 milliGRAM(s) Oral every 24 hours  apixaban 2.5 milliGRAM(s) Oral every 12 hours  aspirin enteric coated 81 milliGRAM(s) Oral daily  atorvastatin 40 milliGRAM(s) Oral at bedtime  calcium acetate 667 milliGRAM(s) Oral three times a day with meals  cloNIDine 0.1 milliGRAM(s) Oral every 12 hours  clopidogrel Tablet 75 milliGRAM(s) Oral daily  dextrose 5%. 1000 milliLiter(s) (100 mL/Hr) IV Continuous <Continuous>  dextrose 5%. 1000 milliLiter(s) (50 mL/Hr) IV Continuous <Continuous>  dextrose 50% Injectable 25 Gram(s) IV Push once  dextrose 50% Injectable 12.5 Gram(s) IV Push once  dextrose 50% Injectable 25 Gram(s) IV Push once  diltiazem    Tablet 60 milliGRAM(s) Oral every 8 hours  epoetin fawad-epbx (RETACRIT) Injectable 22351 Unit(s) IV Push <User Schedule>  glucagon  Injectable 1 milliGRAM(s) IntraMuscular once  imipramine 50 milliGRAM(s) Oral at bedtime  insulin glargine Injectable (LANTUS) 7 Unit(s) SubCutaneous every morning  insulin lispro (ADMELOG) corrective regimen sliding scale   SubCutaneous three times a day before meals  insulin lispro (ADMELOG) corrective regimen sliding scale   SubCutaneous at bedtime  lactobacillus acidophilus 1 Tablet(s) Oral two times a day  metoprolol tartrate 100 milliGRAM(s) Oral every 12 hours  Nephro-elvie 1 Tablet(s) Oral daily  pantoprazole    Tablet 40 milliGRAM(s) Oral before breakfast  piperacillin/tazobactam IVPB.. 3.375 Gram(s) IV Intermittent every 12 hours    MEDICATIONS  (PRN):  acetaminophen     Tablet .. 650 milliGRAM(s) Oral every 6 hours PRN Temp greater or equal to 38C (100.4F), Mild Pain (1 - 3)  aluminum hydroxide/magnesium hydroxide/simethicone Suspension 30 milliLiter(s) Oral every 4 hours PRN Dyspepsia  dextrose Oral Gel 15 Gram(s) Oral once PRN Blood Glucose LESS THAN 70 milliGRAM(s)/deciliter  melatonin 3 milliGRAM(s) Oral at bedtime PRN Insomnia  ondansetron Injectable 4 milliGRAM(s) IV Push every 8 hours PRN Nausea and/or Vomiting  traMADol 50 milliGRAM(s) Oral three times a day PRN Moderate Pain (4 - 6)         Vitals log        ICU Vital Signs Last 24 Hrs  T(C): 36.9 (09 Apr 2022 04:41), Max: 36.9 (09 Apr 2022 04:41)  T(F): 98.5 (09 Apr 2022 04:41), Max: 98.5 (09 Apr 2022 04:41)  HR: 80 (09 Apr 2022 04:41) (75 - 97)  BP: 159/63 (09 Apr 2022 04:41) (159/63 - 177/65)  BP(mean): --  ABP: --  ABP(mean): --  RR: 18 (09 Apr 2022 04:41) (17 - 18)  SpO2: 91% (09 Apr 2022 04:41) (91% - 96%)           Input and Output:  I&O's Detail    08 Apr 2022 07:01  -  09 Apr 2022 07:00  --------------------------------------------------------  IN:    IV PiggyBack: 100 mL  Total IN: 100 mL    OUT:    Other (mL): 500 mL    Voided (mL): 0 mL  Total OUT: 500 mL    Total NET: -400 mL          Lab Data                        10.3   11.86 )-----------( 359      ( 08 Apr 2022 07:41 )             30.4     04-08    135  |  96  |  37<H>  ----------------------------<  229<H>  4.0   |  29  |  4.70<H>    Ca    9.3      08 Apr 2022 07:41              Review of Systems	      Objective     Physical Examination    heart s1s2  lung dec BS  abd soft      Pertinent Lab findings & Imaging      Dexter:  NO   Adequate UO     I&O's Detail    08 Apr 2022 07:01  -  09 Apr 2022 07:00  --------------------------------------------------------  IN:    IV PiggyBack: 100 mL  Total IN: 100 mL    OUT:    Other (mL): 500 mL    Voided (mL): 0 mL  Total OUT: 500 mL    Total NET: -400 mL               Discussed with:     Cultures:	        Radiology

## 2022-04-09 NOTE — PROGRESS NOTE ADULT - SUBJECTIVE AND OBJECTIVE BOX
Patient is a 71y Female whom presented to the hospital with esrd on hd     PAST MEDICAL & SURGICAL HISTORY:  Diabetes mellitus II    HTN (hypertension)    h/o Anxiety attack    Depression    h/o Myocardial infarct 2007    CAD (coronary artery disease)    h/o Hepatitis A 1969  currently resolved, no symptoms    PAD (peripheral artery disease)    Murmur, cardiac    h/o Smoking  quitted 3/2012    CRF (chronic renal failure), unspecified stage    Dialysis patient    Anemia secondary to renal failure    HTN (hypertension)    coronary stent 2007    s/p Ovarian cyst removal    s/p surgical removal of benign Skin lesion epigastric area        MEDICATIONS  (STANDING):  dextrose 5%. 1000 milliLiter(s) (100 mL/Hr) IV Continuous <Continuous>  dextrose 5%. 1000 milliLiter(s) (50 mL/Hr) IV Continuous <Continuous>  dextrose 50% Injectable 25 Gram(s) IV Push once  dextrose 50% Injectable 12.5 Gram(s) IV Push once  dextrose 50% Injectable 25 Gram(s) IV Push once  glucagon  Injectable 1 milliGRAM(s) IntraMuscular once  insulin lispro (ADMELOG) corrective regimen sliding scale   SubCutaneous three times a day before meals  piperacillin/tazobactam IVPB.. 3.375 Gram(s) IV Intermittent every 12 hours      Allergies    latex (Unknown)  No Known Drug Allergies    Intolerances        SOCIAL HISTORY:  Denies ETOh,Smoking,     FAMILY HISTORY:      REVIEW OF SYSTEMS:    CONSTITUTIONAL: No weakness, fevers or chills  RESPIRATORY: No cough, wheezing, hemoptysis; No shortness of breath  CARDIOVASCULAR: No chest pain or palpitations  GASTROINTESTINAL: No abdominal or epigastric pain. No nausea, vomiting,     No diarrhea or constipation. No melena   GENITOURINARY: No dysuria, frequency or hematuria                                                                                               9.5    10.66 )-----------( 403      ( 09 Apr 2022 08:01 )             29.5       CBC Full  -  ( 09 Apr 2022 08:01 )  WBC Count : 10.66 K/uL  RBC Count : 3.11 M/uL  Hemoglobin : 9.5 g/dL  Hematocrit : 29.5 %  Platelet Count - Automated : 403 K/uL  Mean Cell Volume : 94.9 fl  Mean Cell Hemoglobin : 30.5 pg  Mean Cell Hemoglobin Concentration : 32.2 gm/dL  Auto Neutrophil # : x  Auto Lymphocyte # : x  Auto Monocyte # : x  Auto Eosinophil # : x  Auto Basophil # : x  Auto Neutrophil % : x  Auto Lymphocyte % : x  Auto Monocyte % : x  Auto Eosinophil % : x  Auto Basophil % : x      04-09    132<L>  |  93<L>  |  33<H>  ----------------------------<  449<H>  4.0   |  27  |  4.50<H>    Ca    8.8      09 Apr 2022 08:01        CAPILLARY BLOOD GLUCOSE      POCT Blood Glucose.: 222 mg/dL (09 Apr 2022 17:03)  POCT Blood Glucose.: 196 mg/dL (09 Apr 2022 13:37)  POCT Blood Glucose.: 210 mg/dL (09 Apr 2022 11:36)  POCT Blood Glucose.: 344 mg/dL (09 Apr 2022 08:19)      Vital Signs Last 24 Hrs  T(C): 37.4 (09 Apr 2022 19:30), Max: 37.4 (09 Apr 2022 19:30)  T(F): 99.3 (09 Apr 2022 19:30), Max: 99.3 (09 Apr 2022 19:30)  HR: 80 (09 Apr 2022 19:30) (71 - 80)  BP: 149/69 (09 Apr 2022 19:30) (149/69 - 159/63)  BP(mean): --  RR: 15 (09 Apr 2022 19:30) (14 - 18)  SpO2: 93% (09 Apr 2022 19:30) (91% - 100%)                      PHYSICAL EXAM:    Constitutional: NAD  HEENT: conjunctive   clear   Neck:  No JVD  Respiratory: decrease bs b/l   Cardiovascular: S1 and S2  Gastrointestinal: BS+, soft, NT/ND  Extremities: No peripheral edema

## 2022-04-10 RX ADMIN — Medication 5: at 17:12

## 2022-04-10 RX ADMIN — Medication 667 MILLIGRAM(S): at 07:59

## 2022-04-10 RX ADMIN — Medication 0.1 MILLIGRAM(S): at 11:07

## 2022-04-10 RX ADMIN — Medication 1 TABLET(S): at 11:07

## 2022-04-10 RX ADMIN — Medication 0.1 MILLIGRAM(S): at 20:59

## 2022-04-10 RX ADMIN — Medication 60 MILLIGRAM(S): at 20:59

## 2022-04-10 RX ADMIN — CLOPIDOGREL BISULFATE 75 MILLIGRAM(S): 75 TABLET, FILM COATED ORAL at 11:07

## 2022-04-10 RX ADMIN — PIPERACILLIN AND TAZOBACTAM 25 GRAM(S): 4; .5 INJECTION, POWDER, LYOPHILIZED, FOR SOLUTION INTRAVENOUS at 06:52

## 2022-04-10 RX ADMIN — Medication 81 MILLIGRAM(S): at 11:07

## 2022-04-10 RX ADMIN — Medication 50 MILLIGRAM(S): at 21:00

## 2022-04-10 RX ADMIN — Medication 3 MILLIGRAM(S): at 20:59

## 2022-04-10 RX ADMIN — Medication 1 TABLET(S): at 06:52

## 2022-04-10 RX ADMIN — Medication 667 MILLIGRAM(S): at 11:06

## 2022-04-10 RX ADMIN — Medication 3: at 11:50

## 2022-04-10 RX ADMIN — PANTOPRAZOLE SODIUM 40 MILLIGRAM(S): 20 TABLET, DELAYED RELEASE ORAL at 06:52

## 2022-04-10 RX ADMIN — Medication 3: at 07:59

## 2022-04-10 RX ADMIN — ATORVASTATIN CALCIUM 40 MILLIGRAM(S): 80 TABLET, FILM COATED ORAL at 20:59

## 2022-04-10 RX ADMIN — Medication 60 MILLIGRAM(S): at 06:52

## 2022-04-10 RX ADMIN — Medication 60 MILLIGRAM(S): at 13:08

## 2022-04-10 RX ADMIN — Medication 667 MILLIGRAM(S): at 17:12

## 2022-04-10 RX ADMIN — Medication 100 MILLIGRAM(S): at 06:53

## 2022-04-10 RX ADMIN — APIXABAN 2.5 MILLIGRAM(S): 2.5 TABLET, FILM COATED ORAL at 11:07

## 2022-04-10 RX ADMIN — PIPERACILLIN AND TAZOBACTAM 25 GRAM(S): 4; .5 INJECTION, POWDER, LYOPHILIZED, FOR SOLUTION INTRAVENOUS at 17:15

## 2022-04-10 RX ADMIN — INSULIN GLARGINE 7 UNIT(S): 100 INJECTION, SOLUTION SUBCUTANEOUS at 07:59

## 2022-04-10 RX ADMIN — Medication 1 TABLET(S): at 17:12

## 2022-04-10 RX ADMIN — Medication 100 MILLIGRAM(S): at 17:12

## 2022-04-10 RX ADMIN — AMLODIPINE BESYLATE 7.5 MILLIGRAM(S): 2.5 TABLET ORAL at 21:00

## 2022-04-10 RX ADMIN — APIXABAN 2.5 MILLIGRAM(S): 2.5 TABLET, FILM COATED ORAL at 20:59

## 2022-04-10 NOTE — PROGRESS NOTE ADULT - SUBJECTIVE AND OBJECTIVE BOX
PROGRESS NOTE  Patient is a 71y old  Female who presents with a chief complaint of shortness of breath (10 Apr 2022 10:19)  Chart and available morning labs /imaging are reviewed electronically , urgent issues addressed . More information  is being added upon completion of rounds , when more information is collected and management discussed with consultants , medical staff and social service/case management on the floor     OVERNIGHT  Patient is resting in a bed comfortably .Confused ,poor mentation .No distress noted   No new issues reported by medical staff . All above noted   HPI:  72yo female bib ems with sob, pt states she has missed the last 2 rounds of dialysis and is due today, and has been having worsening sob, no cough, fever, chills, no chest pain no other complaints .Found to have hyperkalemia Admitted  to telemetry unit for monitoring , send 3 sets of cardiac enzymes to rule out acute coronary event, obtain ECHO to evaluate LVEF, cardiology consult  ,continue current management, O2 supply, anticoagulation plan as per cardiology consult Nephrology consult stat requested ,management d/w Dr Perez and  Palliative care consult requested ,to discuss advance directives and complete MOLST  (05 Apr 2022 07:22)    PAST MEDICAL & SURGICAL HISTORY:  Diabetes mellitus II    HTN (hypertension)    h/o Anxiety attack    Depression    h/o Myocardial infarct 2007    CAD (coronary artery disease)    h/o Hepatitis A 1969  currently resolved, no symptoms    PAD (peripheral artery disease)    Murmur, cardiac    h/o Smoking  quitted 3/2012    CRF (chronic renal failure), unspecified stage    Dialysis patient    Anemia secondary to renal failure    HTN (hypertension)    coronary stent 2007    s/p Ovarian cyst removal    s/p surgical removal of benign Skin lesion epigastric area        MEDICATIONS  (STANDING):  amLODIPine   Tablet 7.5 milliGRAM(s) Oral every 24 hours  apixaban 2.5 milliGRAM(s) Oral every 12 hours  aspirin enteric coated 81 milliGRAM(s) Oral daily  atorvastatin 40 milliGRAM(s) Oral at bedtime  calcium acetate 667 milliGRAM(s) Oral three times a day with meals  cloNIDine 0.1 milliGRAM(s) Oral every 12 hours  clopidogrel Tablet 75 milliGRAM(s) Oral daily  dextrose 5%. 1000 milliLiter(s) (100 mL/Hr) IV Continuous <Continuous>  dextrose 5%. 1000 milliLiter(s) (50 mL/Hr) IV Continuous <Continuous>  dextrose 50% Injectable 25 Gram(s) IV Push once  dextrose 50% Injectable 12.5 Gram(s) IV Push once  dextrose 50% Injectable 25 Gram(s) IV Push once  diltiazem    Tablet 60 milliGRAM(s) Oral every 8 hours  epoetin fawad-epbx (RETACRIT) Injectable 44050 Unit(s) IV Push <User Schedule>  glucagon  Injectable 1 milliGRAM(s) IntraMuscular once  imipramine 50 milliGRAM(s) Oral at bedtime  insulin glargine Injectable (LANTUS) 7 Unit(s) SubCutaneous every morning  insulin lispro (ADMELOG) corrective regimen sliding scale   SubCutaneous at bedtime  insulin lispro (ADMELOG) corrective regimen sliding scale   SubCutaneous three times a day before meals  lactobacillus acidophilus 1 Tablet(s) Oral two times a day  metoprolol tartrate 100 milliGRAM(s) Oral every 12 hours  Nephro-elvie 1 Tablet(s) Oral daily  pantoprazole    Tablet 40 milliGRAM(s) Oral before breakfast  piperacillin/tazobactam IVPB.. 3.375 Gram(s) IV Intermittent every 12 hours    MEDICATIONS  (PRN):  acetaminophen     Tablet .. 650 milliGRAM(s) Oral every 6 hours PRN Temp greater or equal to 38C (100.4F), Mild Pain (1 - 3)  aluminum hydroxide/magnesium hydroxide/simethicone Suspension 30 milliLiter(s) Oral every 4 hours PRN Dyspepsia  dextrose Oral Gel 15 Gram(s) Oral once PRN Blood Glucose LESS THAN 70 milliGRAM(s)/deciliter  melatonin 3 milliGRAM(s) Oral at bedtime PRN Insomnia  ondansetron Injectable 4 milliGRAM(s) IV Push every 8 hours PRN Nausea and/or Vomiting  traMADol 50 milliGRAM(s) Oral three times a day PRN Moderate Pain (4 - 6)      OBJECTIVE    T(C): 36.7 (04-10-22 @ 12:14), Max: 37.4 (04-09-22 @ 19:30)  HR: 66 (04-10-22 @ 12:14) (66 - 80)  BP: 161/66 (04-10-22 @ 12:14) (149/69 - 161/66)  RR: 17 (04-10-22 @ 12:14) (15 - 17)  SpO2: 92% (04-10-22 @ 12:14) (92% - 96%)  Wt(kg): --  I&O's Summary    09 Apr 2022 07:01  -  10 Apr 2022 07:00  --------------------------------------------------------  IN: 0 mL / OUT: 1000 mL / NET: -1000 mL          REVIEW OF SYSTEMS:  CONSTITUTIONAL: No fever, weight loss, or fatigue  EYES: No eye pain, visual disturbances, or discharge  ENMT:   No sinus or throat pain  NECK: No pain or stiffness  RESPIRATORY: No cough, wheezing, chills or hemoptysis; No shortness of breath  CARDIOVASCULAR: No chest pain, palpitations, dizziness, or leg swelling  GASTROINTESTINAL: No abdominal pain. No nausea, vomiting; No diarrhea or constipation. No melena or hematochezia.  GENITOURINARY: No dysuria, frequency, hematuria, or incontinence  NEUROLOGICAL: No headaches, memory loss, loss of strength, numbness, or tremors  SKIN: No itching, burning, rashes, or lesions   MUSCULOSKELETAL: No joint pain or swelling; No muscle, back, or extremity pain    PHYSICAL EXAM:  Appearance: NAD. VS past 24 hrs -as above   HEENT:   Moist oral mucosa. Conjunctiva clear b/l.   Neck : supple  Respiratory: Lungs CTAB.  Gastrointestinal:  Soft, nontender. No rebound. No rigidity. BS present	  Cardiovascular: RRR ,S1S2 present  Neurologic: Non-focal. Moving all extremities.  Extremities: No edema. No erythema. No calf tenderness.  Skin: No rashes, No ecchymoses, No cyanosis.	  wounds ,skin lesions-See skin assesment flow sheet   LABS:                        9.5    10.66 )-----------( 403      ( 09 Apr 2022 08:01 )             29.5     04-09    132<L>  |  93<L>  |  33<H>  ----------------------------<  449<H>  4.0   |  27  |  4.50<H>    Ca    8.8      09 Apr 2022 08:01      CAPILLARY BLOOD GLUCOSE      POCT Blood Glucose.: 293 mg/dL (10 Apr 2022 11:36)  POCT Blood Glucose.: 287 mg/dL (10 Apr 2022 07:54)  POCT Blood Glucose.: 365 mg/dL (09 Apr 2022 22:22)  POCT Blood Glucose.: 222 mg/dL (09 Apr 2022 17:03)          Culture - Blood (collected 05 Apr 2022 09:28)  Source: .Blood Blood-Venous  Final Report (10 Apr 2022 10:00):    No Growth Final    Culture - Blood (collected 05 Apr 2022 09:28)  Source: .Blood Blood-Venous  Final Report (10 Apr 2022 10:00):    No Growth Final      RADIOLOGY & ADDITIONAL TESTS:   reviewed elctronically  ASSESSMENT/PLAN: 	  25 minutes aggregate time was spent on this visit, 50% visit time spent in care co-ordination with other attendings and counselling patient .I have discussed care plan with patient / HCP/family member ,who expressed understanding of problems treatment and their effect and side effects, alternatives in details. I have asked if they have any questions and concerns and appropriately addressed them to best of my ability.

## 2022-04-10 NOTE — PROGRESS NOTE ADULT - SUBJECTIVE AND OBJECTIVE BOX
Date/Time Patient Seen:  		  Referring MD:   Data Reviewed	       Patient is a 71y old  Female who presents with a chief complaint of shortness of breath (09 Apr 2022 16:44)      Subjective/HPI     PAST MEDICAL & SURGICAL HISTORY:  Diabetes mellitus II    HTN (hypertension)    h/o Anxiety attack    Depression    h/o Myocardial infarct 2007    CAD (coronary artery disease)    CAD (coronary artery disease)    h/o Hepatitis A 1969  currently resolved, no symptoms    PAD (peripheral artery disease)    Murmur, cardiac    h/o Smoking  quitted 3/2012    CRF (chronic renal failure), unspecified stage    Dialysis patient    Anemia secondary to renal failure    HTN (hypertension)    coronary stent 2007    s/p Ovarian cyst removal    s/p surgical removal of benign Skin lesion epigastric area          Medication list         MEDICATIONS  (STANDING):  amLODIPine   Tablet 7.5 milliGRAM(s) Oral every 24 hours  apixaban 2.5 milliGRAM(s) Oral every 12 hours  aspirin enteric coated 81 milliGRAM(s) Oral daily  atorvastatin 40 milliGRAM(s) Oral at bedtime  calcium acetate 667 milliGRAM(s) Oral three times a day with meals  cloNIDine 0.1 milliGRAM(s) Oral every 12 hours  clopidogrel Tablet 75 milliGRAM(s) Oral daily  dextrose 5%. 1000 milliLiter(s) (100 mL/Hr) IV Continuous <Continuous>  dextrose 5%. 1000 milliLiter(s) (50 mL/Hr) IV Continuous <Continuous>  dextrose 50% Injectable 25 Gram(s) IV Push once  dextrose 50% Injectable 12.5 Gram(s) IV Push once  dextrose 50% Injectable 25 Gram(s) IV Push once  diltiazem    Tablet 60 milliGRAM(s) Oral every 8 hours  epoetin fawad-epbx (RETACRIT) Injectable 93654 Unit(s) IV Push <User Schedule>  glucagon  Injectable 1 milliGRAM(s) IntraMuscular once  imipramine 50 milliGRAM(s) Oral at bedtime  insulin glargine Injectable (LANTUS) 7 Unit(s) SubCutaneous every morning  insulin lispro (ADMELOG) corrective regimen sliding scale   SubCutaneous at bedtime  insulin lispro (ADMELOG) corrective regimen sliding scale   SubCutaneous three times a day before meals  lactobacillus acidophilus 1 Tablet(s) Oral two times a day  metoprolol tartrate 100 milliGRAM(s) Oral every 12 hours  Nephro-elvie 1 Tablet(s) Oral daily  pantoprazole    Tablet 40 milliGRAM(s) Oral before breakfast  piperacillin/tazobactam IVPB.. 3.375 Gram(s) IV Intermittent every 12 hours    MEDICATIONS  (PRN):  acetaminophen     Tablet .. 650 milliGRAM(s) Oral every 6 hours PRN Temp greater or equal to 38C (100.4F), Mild Pain (1 - 3)  aluminum hydroxide/magnesium hydroxide/simethicone Suspension 30 milliLiter(s) Oral every 4 hours PRN Dyspepsia  dextrose Oral Gel 15 Gram(s) Oral once PRN Blood Glucose LESS THAN 70 milliGRAM(s)/deciliter  melatonin 3 milliGRAM(s) Oral at bedtime PRN Insomnia  ondansetron Injectable 4 milliGRAM(s) IV Push every 8 hours PRN Nausea and/or Vomiting  traMADol 50 milliGRAM(s) Oral three times a day PRN Moderate Pain (4 - 6)         Vitals log        ICU Vital Signs Last 24 Hrs  T(C): 36.9 (10 Apr 2022 04:17), Max: 37.4 (09 Apr 2022 19:30)  T(F): 98.4 (10 Apr 2022 04:17), Max: 99.3 (09 Apr 2022 19:30)  HR: 77 (10 Apr 2022 04:17) (71 - 80)  BP: 155/55 (10 Apr 2022 04:17) (149/69 - 155/55)  BP(mean): --  ABP: --  ABP(mean): --  RR: 15 (10 Apr 2022 04:17) (14 - 16)  SpO2: 96% (10 Apr 2022 04:17) (93% - 100%)           Input and Output:  I&O's Detail    08 Apr 2022 07:01  -  09 Apr 2022 07:00  --------------------------------------------------------  IN:    IV PiggyBack: 100 mL  Total IN: 100 mL    OUT:    Other (mL): 500 mL    Voided (mL): 0 mL  Total OUT: 500 mL    Total NET: -400 mL      09 Apr 2022 07:01  -  10 Apr 2022 05:57  --------------------------------------------------------  IN:  Total IN: 0 mL    OUT:    Other (mL): 1000 mL  Total OUT: 1000 mL    Total NET: -1000 mL          Lab Data                        9.5    10.66 )-----------( 403      ( 09 Apr 2022 08:01 )             29.5     04-09    132<L>  |  93<L>  |  33<H>  ----------------------------<  449<H>  4.0   |  27  |  4.50<H>    Ca    8.8      09 Apr 2022 08:01              Review of Systems	      Objective     Physical Examination    heart s1s2  lung dec BS  abd soft      Pertinent Lab findings & Imaging      Dexter:  NO   Adequate UO     I&O's Detail    08 Apr 2022 07:01  -  09 Apr 2022 07:00  --------------------------------------------------------  IN:    IV PiggyBack: 100 mL  Total IN: 100 mL    OUT:    Other (mL): 500 mL    Voided (mL): 0 mL  Total OUT: 500 mL    Total NET: -400 mL      09 Apr 2022 07:01  -  10 Apr 2022 05:57  --------------------------------------------------------  IN:  Total IN: 0 mL    OUT:    Other (mL): 1000 mL  Total OUT: 1000 mL    Total NET: -1000 mL               Discussed with:     Cultures:	        Radiology

## 2022-04-10 NOTE — PROGRESS NOTE ADULT - NUTRITIONAL ASSESSMENT
MEDICATIONS  (STANDING):  amLODIPine   Tablet 7.5 milliGRAM(s) Oral every 24 hours  aspirin enteric coated 81 milliGRAM(s) Oral daily  atorvastatin 40 milliGRAM(s) Oral at bedtime  calcium acetate 667 milliGRAM(s) Oral three times a day with meals  cloNIDine 0.1 milliGRAM(s) Oral every 12 hours  clopidogrel Tablet 75 milliGRAM(s) Oral daily  dextrose 5%. 1000 milliLiter(s) (100 mL/Hr) IV Continuous <Continuous>  dextrose 5%. 1000 milliLiter(s) (50 mL/Hr) IV Continuous <Continuous>  dextrose 50% Injectable 25 Gram(s) IV Push once  dextrose 50% Injectable 12.5 Gram(s) IV Push once  dextrose 50% Injectable 25 Gram(s) IV Push once  epoetin fawad-epbx (RETACRIT) Injectable 53169 Unit(s) IV Push <User Schedule>  glucagon  Injectable 1 milliGRAM(s) IntraMuscular once  heparin   Injectable 5000 Unit(s) SubCutaneous every 12 hours  imipramine 50 milliGRAM(s) Oral at bedtime  insulin glargine Injectable (LANTUS) 7 Unit(s) SubCutaneous every morning  insulin lispro (ADMELOG) corrective regimen sliding scale   SubCutaneous at bedtime  insulin lispro (ADMELOG) corrective regimen sliding scale   SubCutaneous three times a day before meals  lactobacillus acidophilus 1 Tablet(s) Oral two times a day  Nephro-elvie 1 Tablet(s) Oral daily  pantoprazole    Tablet 40 milliGRAM(s) Oral before breakfast  piperacillin/tazobactam IVPB.. 3.375 Gram(s) IV Intermittent every 12 hours

## 2022-04-10 NOTE — CHART NOTE - NSCHARTNOTEFT_GEN_A_CORE
Nutrition Follow Up Note    Seen for follow up     Source: EMR, Patient     Chart reviewed, events noted.     Per chart, 2yo female bib ems with sob, pt states she has missed the last 2 rounds of dialysis and is due and has been having worsening sob, no cough, fever, chills, no chest pain no other complaints .Found to have hyperkalemia Admitted  to telemetry unit for monitoring     Diet :  Diet, Consistent Carbohydrate Renal w/Evening Snack:   Easy to Chew (EASYTOCHEW)  Supplement Feeding Modality:  Oral  Nepro Cans or Servings Per Day:  1       Frequency:  Daily (04-06-22 @ 12:14)      Nutrition Events:   -Intake: pt reports good PO intake of [place] Toast this AM; saved Greek yogurt for snack; endorses consuming Nepro supplements and inquired about continuing after d/c  -GI: last BM reported 4/10; per pt, states they were "loose"   -Renal: last HD: 4/9; hypokalemic, replete prn; renal replacement diet ordered  -Endo: long acting and sliding scale insulin ordered; Consistent carbohydrate diet         Pertinent Medications:  MEDICATIONS  (STANDING):  amLODIPine   Tablet 7.5 milliGRAM(s) Oral every 24 hours  apixaban 2.5 milliGRAM(s) Oral every 12 hours  aspirin enteric coated 81 milliGRAM(s) Oral daily  atorvastatin 40 milliGRAM(s) Oral at bedtime  calcium acetate 667 milliGRAM(s) Oral three times a day with meals  cloNIDine 0.1 milliGRAM(s) Oral every 12 hours  clopidogrel Tablet 75 milliGRAM(s) Oral daily  dextrose 5%. 1000 milliLiter(s) (100 mL/Hr) IV Continuous <Continuous>  dextrose 5%. 1000 milliLiter(s) (50 mL/Hr) IV Continuous <Continuous>  dextrose 50% Injectable 25 Gram(s) IV Push once  dextrose 50% Injectable 12.5 Gram(s) IV Push once  dextrose 50% Injectable 25 Gram(s) IV Push once  diltiazem    Tablet 60 milliGRAM(s) Oral every 8 hours  epoetin fawad-epbx (RETACRIT) Injectable 00477 Unit(s) IV Push <User Schedule>  glucagon  Injectable 1 milliGRAM(s) IntraMuscular once  imipramine 50 milliGRAM(s) Oral at bedtime  insulin glargine Injectable (LANTUS) 7 Unit(s) SubCutaneous every morning  insulin lispro (ADMELOG) corrective regimen sliding scale   SubCutaneous at bedtime  insulin lispro (ADMELOG) corrective regimen sliding scale   SubCutaneous three times a day before meals  lactobacillus acidophilus 1 Tablet(s) Oral two times a day  metoprolol tartrate 100 milliGRAM(s) Oral every 12 hours  Nephro-elvie 1 Tablet(s) Oral daily  pantoprazole    Tablet 40 milliGRAM(s) Oral before breakfast  piperacillin/tazobactam IVPB.. 3.375 Gram(s) IV Intermittent every 12 hours    MEDICATIONS  (PRN):  acetaminophen     Tablet .. 650 milliGRAM(s) Oral every 6 hours PRN Temp greater or equal to 38C (100.4F), Mild Pain (1 - 3)  aluminum hydroxide/magnesium hydroxide/simethicone Suspension 30 milliLiter(s) Oral every 4 hours PRN Dyspepsia  dextrose Oral Gel 15 Gram(s) Oral once PRN Blood Glucose LESS THAN 70 milliGRAM(s)/deciliter  melatonin 3 milliGRAM(s) Oral at bedtime PRN Insomnia  ondansetron Injectable 4 milliGRAM(s) IV Push every 8 hours PRN Nausea and/or Vomiting  traMADol 50 milliGRAM(s) Oral three times a day PRN Moderate Pain (4 - 6)        Pertinent Labs:  04-09 Na132 mmol/L<L> Glu 449 mg/dL<H> K+ 4.0 mmol/L Cr  4.50 mg/dL<H> BUN 33 mg/dL<H> 04-07 Phos 6.2 mg/dL<H> 04-05 Alb 3.0 g/dL<L>     CAPILLARY BLOOD GLUCOSE      POCT Blood Glucose.: 287 mg/dL (10 Apr 2022 07:54)  POCT Blood Glucose.: 365 mg/dL (09 Apr 2022 22:22)  POCT Blood Glucose.: 222 mg/dL (09 Apr 2022 17:03)  POCT Blood Glucose.: 196 mg/dL (09 Apr 2022 13:37)  POCT Blood Glucose.: 210 mg/dL (09 Apr 2022 11:36)       Pressure Injury per chart:   -Coccyx: stage 1     Edema per chart: none noted     Estimated Needs: based on 133 pounds   [x] no change since previous assessment  -Energy: 30-35kcal/kg (1809-2110kcal/day)  -Protein: 1.4-1.6g/kg (84-96g/day)         Previous Nutrition Diagnosis: malnutrition    Nutrition Diagnosis is: ongoing      New Nutrition Diagnosis:   P: increased protein-energy needs  E: related to increased physiological demand   S: as evidenced by HD     Interventions and Recommendations  1) Continue Consistent carbohydrate renal diet   2) Continue Nepro x1/day   3) Defer consistency of diet to SLP and team   4) Monitor GI function closely     Monitoring and Evaluation:     [X] PO intake [X] Tolerance to diet prescription [X] weight trends [x] skin integrity     RD remains available and will follow up per protocol.

## 2022-04-10 NOTE — PROGRESS NOTE ADULT - SUBJECTIVE AND OBJECTIVE BOX
Patient is a 71y Female with a known history of :  Fluid overload [E87.70]    ESRD on dialysis [N18.6]    Poor compliance [Z91.19]    Hyperkalemia [E87.5]    DM (diabetes mellitus) [E11.9]    HTN (hypertension) [I10]    CAD (coronary artery disease) [I25.10]    Prophylactic measure [Z29.9]    Foot infection [L08.9]    Atrial fibrillation with tachycardic ventricular rate [I48.91]    Diarrhea [R19.7]    PAD (peripheral artery disease) [I73.9]      HPI:  70yo female bib ems with sob, pt states she has missed the last 2 rounds of dialysis and is due today, and has been having worsening sob, no cough, fever, chills, no chest pain no other complaints .Found to have hyperkalemia Admitted  to telemetry unit for monitoring , send 3 sets of cardiac enzymes to rule out acute coronary event, obtain ECHO to evaluate LVEF, cardiology consult  ,continue current management, O2 supply, anticoagulation plan as per cardiology consult Nephrology consult stat requested ,management d/w Dr Perez and  Palliative care consult requested ,to discuss advance directives and complete MOLST  (05 Apr 2022 07:22)      REVIEW OF SYSTEMS:    CONSTITUTIONAL: No fever, weight loss, or fatigue  EYES: No eye pain, visual disturbances, or discharge  ENMT:  No difficulty hearing, tinnitus, vertigo; No sinus or throat pain  NECK: No pain or stiffness  BREASTS: No pain, masses, or nipple discharge  RESPIRATORY: No cough, wheezing, chills or hemoptysis; No shortness of breath  CARDIOVASCULAR: No chest pain, palpitations, dizziness, or leg swelling  GASTROINTESTINAL: No abdominal or epigastric pain. No nausea, vomiting, or hematemesis; No diarrhea or constipation. No melena or hematochezia.  GENITOURINARY: No dysuria, frequency, hematuria, or incontinence  NEUROLOGICAL: No headaches, memory loss, loss of strength, numbness, or tremors  SKIN: No itching, burning, rashes, or lesions   LYMPH NODES: No enlarged glands  ENDOCRINE: No heat or cold intolerance; No hair loss  MUSCULOSKELETAL: No joint pain or swelling; No muscle, back, or extremity pain  PSYCHIATRIC: No depression, anxiety, mood swings, or difficulty sleeping  HEME/LYMPH: No easy bruising, or bleeding gums  ALLERGY AND IMMUNOLOGIC: No hives or eczema    MEDICATIONS  (STANDING):  amLODIPine   Tablet 7.5 milliGRAM(s) Oral every 24 hours  apixaban 2.5 milliGRAM(s) Oral every 12 hours  aspirin enteric coated 81 milliGRAM(s) Oral daily  atorvastatin 40 milliGRAM(s) Oral at bedtime  calcium acetate 667 milliGRAM(s) Oral three times a day with meals  cloNIDine 0.1 milliGRAM(s) Oral every 12 hours  clopidogrel Tablet 75 milliGRAM(s) Oral daily  dextrose 5%. 1000 milliLiter(s) (50 mL/Hr) IV Continuous <Continuous>  dextrose 5%. 1000 milliLiter(s) (100 mL/Hr) IV Continuous <Continuous>  dextrose 50% Injectable 25 Gram(s) IV Push once  dextrose 50% Injectable 12.5 Gram(s) IV Push once  dextrose 50% Injectable 25 Gram(s) IV Push once  diltiazem    Tablet 60 milliGRAM(s) Oral every 8 hours  epoetin fawad-epbx (RETACRIT) Injectable 90286 Unit(s) IV Push <User Schedule>  glucagon  Injectable 1 milliGRAM(s) IntraMuscular once  imipramine 50 milliGRAM(s) Oral at bedtime  insulin glargine Injectable (LANTUS) 7 Unit(s) SubCutaneous every morning  insulin lispro (ADMELOG) corrective regimen sliding scale   SubCutaneous three times a day before meals  insulin lispro (ADMELOG) corrective regimen sliding scale   SubCutaneous at bedtime  lactobacillus acidophilus 1 Tablet(s) Oral two times a day  metoprolol tartrate 100 milliGRAM(s) Oral every 12 hours  Nephro-elvie 1 Tablet(s) Oral daily  pantoprazole    Tablet 40 milliGRAM(s) Oral before breakfast  piperacillin/tazobactam IVPB.. 3.375 Gram(s) IV Intermittent every 12 hours    MEDICATIONS  (PRN):  acetaminophen     Tablet .. 650 milliGRAM(s) Oral every 6 hours PRN Temp greater or equal to 38C (100.4F), Mild Pain (1 - 3)  aluminum hydroxide/magnesium hydroxide/simethicone Suspension 30 milliLiter(s) Oral every 4 hours PRN Dyspepsia  dextrose Oral Gel 15 Gram(s) Oral once PRN Blood Glucose LESS THAN 70 milliGRAM(s)/deciliter  melatonin 3 milliGRAM(s) Oral at bedtime PRN Insomnia  ondansetron Injectable 4 milliGRAM(s) IV Push every 8 hours PRN Nausea and/or Vomiting  traMADol 50 milliGRAM(s) Oral three times a day PRN Moderate Pain (4 - 6)      ALLERGIES: latex (Unknown)  No Known Drug Allergies      FAMILY HISTORY:      PHYSICAL EXAMINATION:  -----------------------------  T(C): 36.9 (04-10-22 @ 04:17), Max: 37.4 (04-09-22 @ 19:30)  HR: 77 (04-10-22 @ 04:17) (77 - 80)  BP: 155/55 (04-10-22 @ 04:17) (149/69 - 155/55)  RR: 15 (04-10-22 @ 04:17) (14 - 15)  SpO2: 96% (04-10-22 @ 04:17) (93% - 100%)  Wt(kg): --    04-09 @ 07:01  -  04-10 @ 07:00  --------------------------------------------------------  IN:  Total IN: 0 mL    OUT:    Other (mL): 1000 mL  Total OUT: 1000 mL    Total NET: -1000 mL            VITALS  T(C): 36.9 (04-10-22 @ 04:17), Max: 37.4 (04-09-22 @ 19:30)  HR: 77 (04-10-22 @ 04:17) (77 - 80)  BP: 155/55 (04-10-22 @ 04:17) (149/69 - 155/55)  RR: 15 (04-10-22 @ 04:17) (14 - 15)  SpO2: 96% (04-10-22 @ 04:17) (93% - 100%)    Constitutional: well developed, normal appearance, well groomed, well nourished, no deformities and no acute distress.   Eyes: the conjunctiva exhibited no abnormalities and the eyelids demonstrated no xanthelasmas.   HEENT: normal oral mucosa, no oral pallor and no oral cyanosis.   Neck: normal jugular venous A waves present, normal jugular venous V waves present and no jugular venous wilson A waves.   Pulmonary: no respiratory distress, normal respiratory rhythm and effort, no accessory muscle use and lungs were clear to auscultation bilaterally.   Cardiovascular: heart rate and rhythm were normal, normal S1 and S2 and no murmur, gallop, rub, heave or thrill are present.   Abdomen: soft, non-tender, no hepato-splenomegaly and no abdominal mass palpated.   Musculoskeletal: the gait could not be assessed..   Extremities: no clubbing of the fingernails, no localized cyanosis, no petechial hemorrhages and no ischemic changes.   Skin: normal skin color and pigmentation, no rash, no venous stasis, no skin lesions, no skin ulcer and no xanthoma was observed.   Psychiatric: oriented to person, place, and time, the affect was normal, the mood was normal and not feeling anxious.     LABS:   --------  04-09    132<L>  |  93<L>  |  33<H>  ----------------------------<  449<H>  4.0   |  27  |  4.50<H>    Ca    8.8      09 Apr 2022 08:01                           9.5    10.66 )-----------( 403      ( 09 Apr 2022 08:01 )             29.5                 RADIOLOGY:  -----------------    ECG:     ECHO:

## 2022-04-10 NOTE — PROGRESS NOTE ADULT - ASSESSMENT
70yo female bib ems with sob, pt states she has missed the last 2 rounds of dialysis and is due today, and has been having worsening sob    remains with hypoxemia - weaned down to NC o2 support  ID following - on ABX - on Zosyn -   Vascular eval noted - f/u noted - w/u im progress -   ct chest report - poss pna - pulm edema - nodules - mucus plugging -     pt wishes to have HD  renal - dr alvarez  labs and imaging reviewed  on supplemental o2 support  monitor labs - lytes  HD as per Renal team  GOC documented - Spouse Geoffrey - HCP  pt is full code - wants an attempt at Resuscitation - pt does not want to linger on machines

## 2022-04-10 NOTE — PROGRESS NOTE ADULT - SUBJECTIVE AND OBJECTIVE BOX
Patient is a 71y Female whom presented to the hospital with esrd on hd     PAST MEDICAL & SURGICAL HISTORY:  Diabetes mellitus II    HTN (hypertension)    h/o Anxiety attack    Depression    h/o Myocardial infarct 2007    CAD (coronary artery disease)    h/o Hepatitis A 1969  currently resolved, no symptoms    PAD (peripheral artery disease)    Murmur, cardiac    h/o Smoking  quitted 3/2012    CRF (chronic renal failure), unspecified stage    Dialysis patient    Anemia secondary to renal failure    HTN (hypertension)    coronary stent 2007    s/p Ovarian cyst removal    s/p surgical removal of benign Skin lesion epigastric area        MEDICATIONS  (STANDING):  dextrose 5%. 1000 milliLiter(s) (100 mL/Hr) IV Continuous <Continuous>  dextrose 5%. 1000 milliLiter(s) (50 mL/Hr) IV Continuous <Continuous>  dextrose 50% Injectable 25 Gram(s) IV Push once  dextrose 50% Injectable 12.5 Gram(s) IV Push once  dextrose 50% Injectable 25 Gram(s) IV Push once  glucagon  Injectable 1 milliGRAM(s) IntraMuscular once  insulin lispro (ADMELOG) corrective regimen sliding scale   SubCutaneous three times a day before meals  piperacillin/tazobactam IVPB.. 3.375 Gram(s) IV Intermittent every 12 hours      Allergies    latex (Unknown)  No Known Drug Allergies    Intolerances        SOCIAL HISTORY:  Denies ETOh,Smoking,     FAMILY HISTORY:      REVIEW OF SYSTEMS:    CONSTITUTIONAL: No weakness, fevers or chills  RESPIRATORY: No cough, wheezing, hemoptysis; No shortness of breath  CARDIOVASCULAR: No chest pain or palpitations  GASTROINTESTINAL: No abdominal or epigastric pain. No nausea, vomiting,     No diarrhea or constipation. No melena   GENITOURINARY: No dysuria, frequency or hematuria                                                                                                            9.5    10.66 )-----------( 403      ( 09 Apr 2022 08:01 )             29.5       CBC Full  -  ( 09 Apr 2022 08:01 )  WBC Count : 10.66 K/uL  RBC Count : 3.11 M/uL  Hemoglobin : 9.5 g/dL  Hematocrit : 29.5 %  Platelet Count - Automated : 403 K/uL  Mean Cell Volume : 94.9 fl  Mean Cell Hemoglobin : 30.5 pg  Mean Cell Hemoglobin Concentration : 32.2 gm/dL  Auto Neutrophil # : x  Auto Lymphocyte # : x  Auto Monocyte # : x  Auto Eosinophil # : x  Auto Basophil # : x  Auto Neutrophil % : x  Auto Lymphocyte % : x  Auto Monocyte % : x  Auto Eosinophil % : x  Auto Basophil % : x      04-09    132<L>  |  93<L>  |  33<H>  ----------------------------<  449<H>  4.0   |  27  |  4.50<H>    Ca    8.8      09 Apr 2022 08:01        CAPILLARY BLOOD GLUCOSE      POCT Blood Glucose.: 293 mg/dL (10 Apr 2022 11:36)  POCT Blood Glucose.: 287 mg/dL (10 Apr 2022 07:54)  POCT Blood Glucose.: 365 mg/dL (09 Apr 2022 22:22)  POCT Blood Glucose.: 222 mg/dL (09 Apr 2022 17:03)      Vital Signs Last 24 Hrs  T(C): 36.7 (10 Apr 2022 12:14), Max: 37.4 (09 Apr 2022 19:30)  T(F): 98 (10 Apr 2022 12:14), Max: 99.3 (09 Apr 2022 19:30)  HR: 66 (10 Apr 2022 12:14) (66 - 80)  BP: 161/66 (10 Apr 2022 12:14) (149/69 - 161/66)  BP(mean): --  RR: 17 (10 Apr 2022 12:14) (15 - 17)  SpO2: 92% (10 Apr 2022 12:14) (92% - 96%)                  PHYSICAL EXAM:    Constitutional: NAD  HEENT: conjunctive   clear   Neck:  No JVD  Respiratory: decrease bs b/l   Cardiovascular: S1 and S2  Gastrointestinal: BS+, soft, NT/ND  Extremities: No peripheral edema

## 2022-04-11 LAB
ANION GAP SERPL CALC-SCNC: 11 MMOL/L — SIGNIFICANT CHANGE UP (ref 5–17)
BUN SERPL-MCNC: 54 MG/DL — HIGH (ref 7–23)
CALCIUM SERPL-MCNC: 9.1 MG/DL — SIGNIFICANT CHANGE UP (ref 8.5–10.1)
CHLORIDE SERPL-SCNC: 96 MMOL/L — SIGNIFICANT CHANGE UP (ref 96–108)
CO2 SERPL-SCNC: 27 MMOL/L — SIGNIFICANT CHANGE UP (ref 22–31)
CREAT SERPL-MCNC: 6.1 MG/DL — HIGH (ref 0.5–1.3)
EGFR: 7 ML/MIN/1.73M2 — LOW
GLUCOSE SERPL-MCNC: 262 MG/DL — HIGH (ref 70–99)
HCT VFR BLD CALC: 30.4 % — LOW (ref 34.5–45)
HGB BLD-MCNC: 10 G/DL — LOW (ref 11.5–15.5)
MCHC RBC-ENTMCNC: 31.3 PG — SIGNIFICANT CHANGE UP (ref 27–34)
MCHC RBC-ENTMCNC: 32.9 GM/DL — SIGNIFICANT CHANGE UP (ref 32–36)
MCV RBC AUTO: 95.3 FL — SIGNIFICANT CHANGE UP (ref 80–100)
NRBC # BLD: 0 /100 WBCS — SIGNIFICANT CHANGE UP (ref 0–0)
PLATELET # BLD AUTO: 443 K/UL — HIGH (ref 150–400)
POTASSIUM SERPL-MCNC: 4.4 MMOL/L — SIGNIFICANT CHANGE UP (ref 3.5–5.3)
POTASSIUM SERPL-SCNC: 4.4 MMOL/L — SIGNIFICANT CHANGE UP (ref 3.5–5.3)
RBC # BLD: 3.19 M/UL — LOW (ref 3.8–5.2)
RBC # FLD: 18 % — HIGH (ref 10.3–14.5)
SARS-COV-2 RNA SPEC QL NAA+PROBE: SIGNIFICANT CHANGE UP
SODIUM SERPL-SCNC: 134 MMOL/L — LOW (ref 135–145)
WBC # BLD: 16.23 K/UL — HIGH (ref 3.8–10.5)
WBC # FLD AUTO: 16.23 K/UL — HIGH (ref 3.8–10.5)

## 2022-04-11 PROCEDURE — 99232 SBSQ HOSP IP/OBS MODERATE 35: CPT

## 2022-04-11 PROCEDURE — 99233 SBSQ HOSP IP/OBS HIGH 50: CPT

## 2022-04-11 RX ORDER — CEFAZOLIN SODIUM 1 G
1000 VIAL (EA) INJECTION EVERY 24 HOURS
Refills: 0 | Status: DISCONTINUED | OUTPATIENT
Start: 2022-04-11 | End: 2022-04-13

## 2022-04-11 RX ORDER — INSULIN GLARGINE 100 [IU]/ML
10 INJECTION, SOLUTION SUBCUTANEOUS EVERY MORNING
Refills: 0 | Status: DISCONTINUED | OUTPATIENT
Start: 2022-04-11 | End: 2022-04-13

## 2022-04-11 RX ADMIN — Medication 6: at 17:46

## 2022-04-11 RX ADMIN — Medication 60 MILLIGRAM(S): at 15:25

## 2022-04-11 RX ADMIN — Medication 1 TABLET(S): at 12:49

## 2022-04-11 RX ADMIN — Medication 60 MILLIGRAM(S): at 05:27

## 2022-04-11 RX ADMIN — Medication 100 MILLIGRAM(S): at 17:46

## 2022-04-11 RX ADMIN — APIXABAN 2.5 MILLIGRAM(S): 2.5 TABLET, FILM COATED ORAL at 12:49

## 2022-04-11 RX ADMIN — CLOPIDOGREL BISULFATE 75 MILLIGRAM(S): 75 TABLET, FILM COATED ORAL at 12:52

## 2022-04-11 RX ADMIN — Medication 1 TABLET(S): at 17:47

## 2022-04-11 RX ADMIN — PANTOPRAZOLE SODIUM 40 MILLIGRAM(S): 20 TABLET, DELAYED RELEASE ORAL at 05:27

## 2022-04-11 RX ADMIN — Medication 100 MILLIGRAM(S): at 05:26

## 2022-04-11 RX ADMIN — Medication 60 MILLIGRAM(S): at 22:01

## 2022-04-11 RX ADMIN — Medication 1 TABLET(S): at 05:26

## 2022-04-11 RX ADMIN — Medication 0.1 MILLIGRAM(S): at 12:50

## 2022-04-11 RX ADMIN — PIPERACILLIN AND TAZOBACTAM 25 GRAM(S): 4; .5 INJECTION, POWDER, LYOPHILIZED, FOR SOLUTION INTRAVENOUS at 05:27

## 2022-04-11 RX ADMIN — Medication 3: at 21:57

## 2022-04-11 RX ADMIN — Medication 50 MILLIGRAM(S): at 22:00

## 2022-04-11 RX ADMIN — Medication 5: at 12:54

## 2022-04-11 RX ADMIN — AMLODIPINE BESYLATE 7.5 MILLIGRAM(S): 2.5 TABLET ORAL at 22:00

## 2022-04-11 RX ADMIN — Medication 100 MILLIGRAM(S): at 17:47

## 2022-04-11 RX ADMIN — Medication 667 MILLIGRAM(S): at 12:50

## 2022-04-11 RX ADMIN — INSULIN GLARGINE 7 UNIT(S): 100 INJECTION, SOLUTION SUBCUTANEOUS at 08:44

## 2022-04-11 RX ADMIN — Medication 81 MILLIGRAM(S): at 12:52

## 2022-04-11 RX ADMIN — Medication 3: at 08:43

## 2022-04-11 RX ADMIN — Medication 667 MILLIGRAM(S): at 17:46

## 2022-04-11 RX ADMIN — APIXABAN 2.5 MILLIGRAM(S): 2.5 TABLET, FILM COATED ORAL at 22:00

## 2022-04-11 RX ADMIN — ATORVASTATIN CALCIUM 40 MILLIGRAM(S): 80 TABLET, FILM COATED ORAL at 22:00

## 2022-04-11 RX ADMIN — Medication 0.1 MILLIGRAM(S): at 22:00

## 2022-04-11 NOTE — PROGRESS NOTE ADULT - SUBJECTIVE AND OBJECTIVE BOX
[facility]  INFECTIOUS DISEASES - [address], [place] [address]  Tel: [phone]     Fax: [phone]  =======================================================    SHILO KOHLER [id number]    Follow up: No fevers. Seen on 2L nasal cannula. Breathing much improved, has minimal cough. Has occasional pain on feet but otherwise denies any pain. Had 1 BM this morning and 2 yesterday.    Allergies:  latex (Unknown)  No Known Drug Allergies      Antibiotics:  acetaminophen     Tablet .. 650 milliGRAM(s) Oral every 6 hours PRN  aluminum hydroxide/magnesium hydroxide/simethicone Suspension 30 milliLiter(s) Oral every 4 hours PRN  amLODIPine   Tablet 7.5 milliGRAM(s) Oral every 24 hours  apixaban 2.5 milliGRAM(s) Oral every 12 hours  aspirin enteric coated 81 milliGRAM(s) Oral daily  atorvastatin 40 milliGRAM(s) Oral at bedtime  calcium acetate 667 milliGRAM(s) Oral three times a day with meals  cloNIDine 0.1 milliGRAM(s) Oral every 12 hours  clopidogrel Tablet 75 milliGRAM(s) Oral daily  dextrose 5%. 1000 milliLiter(s) IV Continuous <Continuous>  dextrose 5%. 1000 milliLiter(s) IV Continuous <Continuous>  dextrose 50% Injectable 25 Gram(s) IV Push once  dextrose 50% Injectable 12.5 Gram(s) IV Push once  dextrose 50% Injectable 25 Gram(s) IV Push once  dextrose Oral Gel 15 Gram(s) Oral once PRN  diltiazem    Tablet 60 milliGRAM(s) Oral every 8 hours  epoetin fawad-epbx (RETACRIT) Injectable 73599 Unit(s) IV Push <User Schedule>  glucagon  Injectable 1 milliGRAM(s) IntraMuscular once  imipramine 50 milliGRAM(s) Oral at bedtime  insulin glargine Injectable (LANTUS) 7 Unit(s) SubCutaneous every morning  insulin lispro (ADMELOG) corrective regimen sliding scale   SubCutaneous at bedtime  insulin lispro (ADMELOG) corrective regimen sliding scale   SubCutaneous three times a day before meals  lactobacillus acidophilus 1 Tablet(s) Oral two times a day  melatonin 3 milliGRAM(s) Oral at bedtime PRN  metoprolol tartrate 100 milliGRAM(s) Oral every 12 hours  Nephro-elvie 1 Tablet(s) Oral daily  ondansetron Injectable 4 milliGRAM(s) IV Push every 8 hours PRN  pantoprazole    Tablet 40 milliGRAM(s) Oral before breakfast  piperacillin/tazobactam IVPB.. 3.375 Gram(s) IV Intermittent every 12 hours  traMADol 50 milliGRAM(s) Oral three times a day PRN       REVIEW OF SYSTEMS:  CONSTITUTIONAL:  No Fever or chills  CARDIOVASCULAR:  No chest pain  RESPIRATORY: see history  GASTROINTESTINAL:  No nausea, vomiting. no abdominal pain  GENITOURINARY:  No dysuria, frequency or urgency.  MUSCULOSKELETAL:  no back pain  NEUROLOGIC:  No headache, no dizziness  PSYCHIATRIC:  No disorder of thought or mood.     Physical Exam:  ICU Vital Signs Last 24 Hrs  T(C): 36.4 (11 Apr 2022 11:35), Max: 37.1 (10 Apr 2022 19:28)  T(F): 97.5 (11 Apr 2022 11:35), Max: 98.7 (10 Apr 2022 19:28)  HR: 65 (11 Apr 2022 11:35) (65 - 79)  BP: 152/77 (11 Apr 2022 11:35) (145/61 - 161/66)  BP(mean): --  ABP: --  ABP(mean): --  RR: 18 (11 Apr 2022 11:35) (17 - 18)  SpO2: 96% (11 Apr 2022 11:35) (92% - 97%)     GEN: NAD  HEENT: normocephalic and atraumatic.     NECK: Supple.   LUNGS: Clear to auscultation.  HEART: Regular rate and rhythm  ABDOMEN: Soft, nontender, and nondistended.   EXTREMITIES: No knee swelling, no leg edema. RUE AV fistula  NEUROLOGIC: AOx3  PSYCHIATRIC: Appropriate affect .  SKIN: R 1st and 2nd toe ulcers, no drainage, no surrounding swelling noted  left medial heel ulcer, no drainage, no surrounding swelling    Labs:  04-11    134<L>  |  96  |  54<H>  ----------------------------<  262<H>  4.4   |  27  |  6.10<H>    Ca    9.1      11 Apr 2022 07:36                            10.0   16.23 )-----------( 443      ( 11 Apr 2022 07:36 )             30.4             RECENT CULTURES:  04-05 @ 09:28 .Blood Blood-Venous     No Growth Final        03-30 @ 18:08 .Tissue Other, Left Medial Ankle Klebsiella oxytoca /Raoutella ornithinolytica  Escherichia coli  Staphylococcus aureus    Few Klebsiella oxytoca/Raoutella ornithinolytica  Few Escherichia coli  Moderate Staphylococcus aureus    Few polymorphonuclear leukocytes per low power field  Moderate Gram positive cocci in pairs per oil power field  Rare Gram Negative Rods per oil power field            All imaging and data are reviewed.     MRI L ankle:    Evaluation limited due to motion.    Soft tissue ulcer overlying the medial malleolus. Osseous edema and T1   marrow signal abnormality within the subjacent medial malleolus, most   consistent with osteomyelitis. No acute fracture.    Joints are unremarkable.    Chronic scar remodeling of the lateral ligamentous complex. Chronic scar   remodeling of the deltoid ligamentous complex.    Mild tenosynovitis of the posterior tibialis tendon in the region of the   ulcer. Possible retromalleolar longitudinal split tear of the peroneus   brevis tendon with reconstitution distally, but evaluation is limited due   to motion. Mild mid to distal Achilles tendinosis. Moderate plantar   fasciitis.    Impression:    Limited evaluation due to motion artifact.    Soft tissue ulcer overlying the medial malleolus with findings most   consistent with osteomyelitis of the underlying medial malleolus.    In the region of the ulcer, there is mild tenosynovitis of the posterior   tibialis tendon, which may be reactive or infectious in etiology.    Possible retromalleolar longitudinal split tear of the peroneus brevis   with reconstitution distally.    Other findings as above.      MRI R foot:    Evaluation of the toes is limited due to motion artifact.    Suggestion of ulcer overlying the distal aspect of the first toe. Osseous   edema with T1 marrow signal abnormality within the underlying first   distal phalanx, likely related to osteomyelitis.    Possible ulcer overlying the distal aspect of the second toe. Apparent   osseous edema without definite T1 marrow signal abnormality within the   second toe, which may represent osteitis/early osteomyelitis or be   related to artifact. Recommend clinical correlation    No acute fracture seen. Lisfranc ligament is grossly intact. Edema with   mild fatty infiltration of the visualized musculature, which may be   related to disuse.    Impression:    Limited evaluation due to patient motion.    Suggestion of ulcer overlying the distal aspect of the first toe. Osseous   edema with areas of loss of T1 marrow signal within the subjacent first   distal phalanx, which may represent osteomyelitis.    Possible ulcer overlying the distal aspect of the second toe. Apparent   mild osseous edema without T1 marrow signal abnormality within the second   distal phalanx, which may be related to artifact or be related to   osteitis/early osteomyelitis. Clinical correlation recommended.

## 2022-04-11 NOTE — PROGRESS NOTE ADULT - NUTRITIONAL ASSESSMENT
MEDICATIONS  (STANDING):  amLODIPine   Tablet 7.5 milliGRAM(s) Oral every 24 hours  aspirin enteric coated 81 milliGRAM(s) Oral daily  atorvastatin 40 milliGRAM(s) Oral at bedtime  calcium acetate 667 milliGRAM(s) Oral three times a day with meals  cloNIDine 0.1 milliGRAM(s) Oral every 12 hours  clopidogrel Tablet 75 milliGRAM(s) Oral daily  dextrose 5%. 1000 milliLiter(s) (100 mL/Hr) IV Continuous <Continuous>  dextrose 5%. 1000 milliLiter(s) (50 mL/Hr) IV Continuous <Continuous>  dextrose 50% Injectable 25 Gram(s) IV Push once  dextrose 50% Injectable 12.5 Gram(s) IV Push once  dextrose 50% Injectable 25 Gram(s) IV Push once  epoetin fawad-epbx (RETACRIT) Injectable 41682 Unit(s) IV Push <User Schedule>  glucagon  Injectable 1 milliGRAM(s) IntraMuscular once  heparin   Injectable 5000 Unit(s) SubCutaneous every 12 hours  imipramine 50 milliGRAM(s) Oral at bedtime  insulin glargine Injectable (LANTUS) 7 Unit(s) SubCutaneous every morning  insulin lispro (ADMELOG) corrective regimen sliding scale   SubCutaneous at bedtime  insulin lispro (ADMELOG) corrective regimen sliding scale   SubCutaneous three times a day before meals  lactobacillus acidophilus 1 Tablet(s) Oral two times a day  Nephro-elvie 1 Tablet(s) Oral daily  pantoprazole    Tablet 40 milliGRAM(s) Oral before breakfast  piperacillin/tazobactam IVPB.. 3.375 Gram(s) IV Intermittent every 12 hours

## 2022-04-11 NOTE — PROGRESS NOTE ADULT - SUBJECTIVE AND OBJECTIVE BOX
Patient is a 71y Female whom presented to the hospital with esrd on hd     PAST MEDICAL & SURGICAL HISTORY:  Diabetes mellitus II    HTN (hypertension)    h/o Anxiety attack    Depression    h/o Myocardial infarct 2007    CAD (coronary artery disease)    h/o Hepatitis A 1969  currently resolved, no symptoms    PAD (peripheral artery disease)    Murmur, cardiac    h/o Smoking  quitted 3/2012    CRF (chronic renal failure), unspecified stage    Dialysis patient    Anemia secondary to renal failure    HTN (hypertension)    coronary stent 2007    s/p Ovarian cyst removal    s/p surgical removal of benign Skin lesion epigastric area        MEDICATIONS  (STANDING):  dextrose 5%. 1000 milliLiter(s) (100 mL/Hr) IV Continuous <Continuous>  dextrose 5%. 1000 milliLiter(s) (50 mL/Hr) IV Continuous <Continuous>  dextrose 50% Injectable 25 Gram(s) IV Push once  dextrose 50% Injectable 12.5 Gram(s) IV Push once  dextrose 50% Injectable 25 Gram(s) IV Push once  glucagon  Injectable 1 milliGRAM(s) IntraMuscular once  insulin lispro (ADMELOG) corrective regimen sliding scale   SubCutaneous three times a day before meals  piperacillin/tazobactam IVPB.. 3.375 Gram(s) IV Intermittent every 12 hours      Allergies    latex (Unknown)  No Known Drug Allergies    Intolerances        SOCIAL HISTORY:  Denies ETOh,Smoking,     FAMILY HISTORY:      REVIEW OF SYSTEMS:    CONSTITUTIONAL: No weakness, fevers or chills  RESPIRATORY: No cough, wheezing, hemoptysis; No shortness of breath  CARDIOVASCULAR: No chest pain or palpitations  GASTROINTESTINAL: No abdominal or epigastric pain. No nausea, vomiting,     No diarrhea or constipation. No melena   GENITOURINARY: No dysuria, frequency or hematuria                                                                                                                                  10.0   16.23 )-----------( 443      ( 11 Apr 2022 07:36 )             30.4       CBC Full  -  ( 11 Apr 2022 07:36 )  WBC Count : 16.23 K/uL  RBC Count : 3.19 M/uL  Hemoglobin : 10.0 g/dL  Hematocrit : 30.4 %  Platelet Count - Automated : 443 K/uL  Mean Cell Volume : 95.3 fl  Mean Cell Hemoglobin : 31.3 pg  Mean Cell Hemoglobin Concentration : 32.9 gm/dL  Auto Neutrophil # : x  Auto Lymphocyte # : x  Auto Monocyte # : x  Auto Eosinophil # : x  Auto Basophil # : x  Auto Neutrophil % : x  Auto Lymphocyte % : x  Auto Monocyte % : x  Auto Eosinophil % : x  Auto Basophil % : x      04-11    134<L>  |  96  |  54<H>  ----------------------------<  262<H>  4.4   |  27  |  6.10<H>    Ca    9.1      11 Apr 2022 07:36        CAPILLARY BLOOD GLUCOSE      POCT Blood Glucose.: 430 mg/dL (11 Apr 2022 17:04)  POCT Blood Glucose.: 423 mg/dL (11 Apr 2022 17:02)  POCT Blood Glucose.: 395 mg/dL (11 Apr 2022 12:16)  POCT Blood Glucose.: 266 mg/dL (11 Apr 2022 08:04)  POCT Blood Glucose.: 231 mg/dL (10 Apr 2022 21:04)      Vital Signs Last 24 Hrs  T(C): 36.8 (11 Apr 2022 19:16), Max: 36.8 (11 Apr 2022 19:16)  T(F): 98.3 (11 Apr 2022 19:16), Max: 98.3 (11 Apr 2022 19:16)  HR: 74 (11 Apr 2022 19:16) (65 - 74)  BP: 155/66 (11 Apr 2022 19:16) (141/58 - 155/66)  BP(mean): --  RR: 18 (11 Apr 2022 19:16) (18 - 18)  SpO2: 96% (11 Apr 2022 11:35) (94% - 97%)            PHYSICAL EXAM:    Constitutional: NAD  HEENT: conjunctive   clear   Neck:  No JVD  Respiratory: decrease bs b/l   Cardiovascular: S1 and S2  Gastrointestinal: BS+, soft, NT/ND  Extremities: No peripheral edema

## 2022-04-11 NOTE — PROGRESS NOTE ADULT - SUBJECTIVE AND OBJECTIVE BOX
PROGRESS NOTE  Patient is a 71y old  Female who presents with a chief complaint of shortness of breath (11 Apr 2022 11:38)    Chart and available morning labs /imaging are reviewed electronically , urgent issues addressed . More information  is being added upon completion of rounds , when more information is collected and management discussed with consultants , medical staff and social service/case management on the floor   OVERNIGHT      HPI:  70yo female bib ems with sob, pt states she has missed the last 2 rounds of dialysis and is due today, and has been having worsening sob, no cough, fever, chills, no chest pain no other complaints .Found to have hyperkalemia Admitted  to telemetry unit for monitoring , send 3 sets of cardiac enzymes to rule out acute coronary event, obtain ECHO to evaluate LVEF, cardiology consult  ,continue current management, O2 supply, anticoagulation plan as per cardiology consult Nephrology consult stat requested ,management d/w Dr Perez and  Palliative care consult requested ,to discuss advance directives and complete MOLST  (05 Apr 2022 07:22)    PAST MEDICAL & SURGICAL HISTORY:  Diabetes mellitus II    HTN (hypertension)    h/o Anxiety attack    Depression    h/o Myocardial infarct 2007    CAD (coronary artery disease)    h/o Hepatitis A 1969  currently resolved, no symptoms    PAD (peripheral artery disease)    Murmur, cardiac    h/o Smoking  quitted 3/2012    CRF (chronic renal failure), unspecified stage    Dialysis patient    Anemia secondary to renal failure    HTN (hypertension)    coronary stent 2007    s/p Ovarian cyst removal    s/p surgical removal of benign Skin lesion epigastric area        MEDICATIONS  (STANDING):  amLODIPine   Tablet 7.5 milliGRAM(s) Oral every 24 hours  apixaban 2.5 milliGRAM(s) Oral every 12 hours  aspirin enteric coated 81 milliGRAM(s) Oral daily  atorvastatin 40 milliGRAM(s) Oral at bedtime  calcium acetate 667 milliGRAM(s) Oral three times a day with meals  cloNIDine 0.1 milliGRAM(s) Oral every 12 hours  clopidogrel Tablet 75 milliGRAM(s) Oral daily  dextrose 5%. 1000 milliLiter(s) (100 mL/Hr) IV Continuous <Continuous>  dextrose 5%. 1000 milliLiter(s) (50 mL/Hr) IV Continuous <Continuous>  dextrose 50% Injectable 25 Gram(s) IV Push once  dextrose 50% Injectable 12.5 Gram(s) IV Push once  dextrose 50% Injectable 25 Gram(s) IV Push once  diltiazem    Tablet 60 milliGRAM(s) Oral every 8 hours  epoetin fawad-epbx (RETACRIT) Injectable 60132 Unit(s) IV Push <User Schedule>  glucagon  Injectable 1 milliGRAM(s) IntraMuscular once  imipramine 50 milliGRAM(s) Oral at bedtime  insulin glargine Injectable (LANTUS) 7 Unit(s) SubCutaneous every morning  insulin lispro (ADMELOG) corrective regimen sliding scale   SubCutaneous at bedtime  insulin lispro (ADMELOG) corrective regimen sliding scale   SubCutaneous three times a day before meals  lactobacillus acidophilus 1 Tablet(s) Oral two times a day  metoprolol tartrate 100 milliGRAM(s) Oral every 12 hours  Nephro-elvie 1 Tablet(s) Oral daily  pantoprazole    Tablet 40 milliGRAM(s) Oral before breakfast  piperacillin/tazobactam IVPB.. 3.375 Gram(s) IV Intermittent every 12 hours    MEDICATIONS  (PRN):  acetaminophen     Tablet .. 650 milliGRAM(s) Oral every 6 hours PRN Temp greater or equal to 38C (100.4F), Mild Pain (1 - 3)  aluminum hydroxide/magnesium hydroxide/simethicone Suspension 30 milliLiter(s) Oral every 4 hours PRN Dyspepsia  dextrose Oral Gel 15 Gram(s) Oral once PRN Blood Glucose LESS THAN 70 milliGRAM(s)/deciliter  melatonin 3 milliGRAM(s) Oral at bedtime PRN Insomnia  ondansetron Injectable 4 milliGRAM(s) IV Push every 8 hours PRN Nausea and/or Vomiting  traMADol 50 milliGRAM(s) Oral three times a day PRN Moderate Pain (4 - 6)      OBJECTIVE    T(C): 36.4 (04-11-22 @ 11:35), Max: 37.1 (04-10-22 @ 19:28)  HR: 65 (04-11-22 @ 11:35) (65 - 79)  BP: 152/77 (04-11-22 @ 11:35) (141/58 - 155/54)  RR: 18 (04-11-22 @ 11:35) (17 - 18)  SpO2: 96% (04-11-22 @ 11:35) (94% - 97%)  Wt(kg): --  I&O's Summary        REVIEW OF SYSTEMS:  CONSTITUTIONAL: No fever, weight loss, or fatigue  EYES: No eye pain, visual disturbances, or discharge  ENMT:   No sinus or throat pain  NECK: No pain or stiffness  RESPIRATORY: No cough, wheezing, chills or hemoptysis; No shortness of breath  CARDIOVASCULAR: No chest pain, palpitations, dizziness, or leg swelling  GASTROINTESTINAL: No abdominal pain. No nausea, vomiting; No diarrhea or constipation. No melena or hematochezia.  GENITOURINARY: No dysuria, frequency, hematuria, or incontinence  NEUROLOGICAL: No headaches, memory loss, loss of strength, numbness, or tremors  SKIN: No itching, burning, rashes, or lesions   MUSCULOSKELETAL: No joint pain or swelling; No muscle, back, or extremity pain    PHYSICAL EXAM:  Appearance: NAD. VS past 24 hrs -as above   HEENT:   Moist oral mucosa. Conjunctiva clear b/l.   Neck : supple  Respiratory: Lungs CTAB.  Gastrointestinal:  Soft, nontender. No rebound. No rigidity. BS present	  Cardiovascular: RRR ,S1S2 present  Neurologic: Non-focal. Moving all extremities.  Extremities: No edema. No erythema. No calf tenderness.  Skin: No rashes, No ecchymoses, No cyanosis.	  wounds ,skin lesions-See skin assesment flow sheet   LABS:                        10.0   16.23 )-----------( 443      ( 11 Apr 2022 07:36 )             30.4     04-11    134<L>  |  96  |  54<H>  ----------------------------<  262<H>  4.4   |  27  |  6.10<H>    Ca    9.1      11 Apr 2022 07:36      CAPILLARY BLOOD GLUCOSE      POCT Blood Glucose.: 395 mg/dL (11 Apr 2022 12:16)  POCT Blood Glucose.: 266 mg/dL (11 Apr 2022 08:04)  POCT Blood Glucose.: 231 mg/dL (10 Apr 2022 21:04)  POCT Blood Glucose.: 370 mg/dL (10 Apr 2022 17:05)          Culture - Blood (collected 05 Apr 2022 09:28)  Source: .Blood Blood-Venous  Final Report (10 Apr 2022 10:00):    No Growth Final    Culture - Blood (collected 05 Apr 2022 09:28)  Source: .Blood Blood-Venous  Final Report (10 Apr 2022 10:00):    No Growth Final      RADIOLOGY & ADDITIONAL TESTS:   reviewed elctronically  ASSESSMENT/PLAN: 	     PROGRESS NOTE  Patient is a 71y old  Female who presents with a chief complaint of shortness of breath (11 Apr 2022 11:38)    Chart and available morning labs /imaging are reviewed electronically , urgent issues addressed . More information  is being added upon completion of rounds , when more information is collected and management discussed with consultants , medical staff and social service/case management on the floor   OVERNIGHT  No new issues reported by medical staff . All above noted Patient is resting in a bed comfortably .Confused ,poor mentation .No distress noted     HPI:  72yo female bib ems with sob, pt states she has missed the last 2 rounds of dialysis and is due today, and has been having worsening sob, no cough, fever, chills, no chest pain no other complaints .Found to have hyperkalemia Admitted  to telemetry unit for monitoring , send 3 sets of cardiac enzymes to rule out acute coronary event, obtain ECHO to evaluate LVEF, cardiology consult  ,continue current management, O2 supply, anticoagulation plan as per cardiology consult Nephrology consult stat requested ,management d/w Dr Perez and  Palliative care consult requested ,to discuss advance directives and complete MOLST  (05 Apr 2022 07:22)    PAST MEDICAL & SURGICAL HISTORY:  Diabetes mellitus II    HTN (hypertension)    h/o Anxiety attack    Depression    h/o Myocardial infarct 2007    CAD (coronary artery disease)    h/o Hepatitis A 1969  currently resolved, no symptoms    PAD (peripheral artery disease)    Murmur, cardiac    h/o Smoking  quitted 3/2012    CRF (chronic renal failure), unspecified stage    Dialysis patient    Anemia secondary to renal failure    HTN (hypertension)    coronary stent 2007    s/p Ovarian cyst removal    s/p surgical removal of benign Skin lesion epigastric area        MEDICATIONS  (STANDING):  amLODIPine   Tablet 7.5 milliGRAM(s) Oral every 24 hours  apixaban 2.5 milliGRAM(s) Oral every 12 hours  aspirin enteric coated 81 milliGRAM(s) Oral daily  atorvastatin 40 milliGRAM(s) Oral at bedtime  calcium acetate 667 milliGRAM(s) Oral three times a day with meals  cloNIDine 0.1 milliGRAM(s) Oral every 12 hours  clopidogrel Tablet 75 milliGRAM(s) Oral daily  dextrose 5%. 1000 milliLiter(s) (100 mL/Hr) IV Continuous <Continuous>  dextrose 5%. 1000 milliLiter(s) (50 mL/Hr) IV Continuous <Continuous>  dextrose 50% Injectable 25 Gram(s) IV Push once  dextrose 50% Injectable 12.5 Gram(s) IV Push once  dextrose 50% Injectable 25 Gram(s) IV Push once  diltiazem    Tablet 60 milliGRAM(s) Oral every 8 hours  epoetin fawad-epbx (RETACRIT) Injectable 21532 Unit(s) IV Push <User Schedule>  glucagon  Injectable 1 milliGRAM(s) IntraMuscular once  imipramine 50 milliGRAM(s) Oral at bedtime  insulin glargine Injectable (LANTUS) 7 Unit(s) SubCutaneous every morning  insulin lispro (ADMELOG) corrective regimen sliding scale   SubCutaneous at bedtime  insulin lispro (ADMELOG) corrective regimen sliding scale   SubCutaneous three times a day before meals  lactobacillus acidophilus 1 Tablet(s) Oral two times a day  metoprolol tartrate 100 milliGRAM(s) Oral every 12 hours  Nephro-elvie 1 Tablet(s) Oral daily  pantoprazole    Tablet 40 milliGRAM(s) Oral before breakfast  piperacillin/tazobactam IVPB.. 3.375 Gram(s) IV Intermittent every 12 hours    MEDICATIONS  (PRN):  acetaminophen     Tablet .. 650 milliGRAM(s) Oral every 6 hours PRN Temp greater or equal to 38C (100.4F), Mild Pain (1 - 3)  aluminum hydroxide/magnesium hydroxide/simethicone Suspension 30 milliLiter(s) Oral every 4 hours PRN Dyspepsia  dextrose Oral Gel 15 Gram(s) Oral once PRN Blood Glucose LESS THAN 70 milliGRAM(s)/deciliter  melatonin 3 milliGRAM(s) Oral at bedtime PRN Insomnia  ondansetron Injectable 4 milliGRAM(s) IV Push every 8 hours PRN Nausea and/or Vomiting  traMADol 50 milliGRAM(s) Oral three times a day PRN Moderate Pain (4 - 6)      OBJECTIVE    T(C): 36.4 (04-11-22 @ 11:35), Max: 37.1 (04-10-22 @ 19:28)  HR: 65 (04-11-22 @ 11:35) (65 - 79)  BP: 152/77 (04-11-22 @ 11:35) (141/58 - 155/54)  RR: 18 (04-11-22 @ 11:35) (17 - 18)  SpO2: 96% (04-11-22 @ 11:35) (94% - 97%)  Wt(kg): --  I&O's Summary        REVIEW OF SYSTEMS:  CONSTITUTIONAL: No fever, weight loss, or fatigue  EYES: No eye pain, visual disturbances, or discharge  ENMT:   No sinus or throat pain  NECK: No pain or stiffness  RESPIRATORY: No cough, wheezing, chills or hemoptysis; No shortness of breath  CARDIOVASCULAR: No chest pain, palpitations, dizziness, or leg swelling  GASTROINTESTINAL: No abdominal pain. No nausea, vomiting; No diarrhea or constipation. No melena or hematochezia.  GENITOURINARY: No dysuria, frequency, hematuria, or incontinence  NEUROLOGICAL: No headaches, memory loss, loss of strength, numbness, or tremors  SKIN: No itching, burning, rashes, or lesions   MUSCULOSKELETAL: No joint pain or swelling; No muscle, back, or extremity pain    PHYSICAL EXAM:  Appearance: NAD. VS past 24 hrs -as above   HEENT:   Moist oral mucosa. Conjunctiva clear b/l.   Neck : supple  Respiratory: Lungs CTAB.  Gastrointestinal:  Soft, nontender. No rebound. No rigidity. BS present	  Cardiovascular: RRR ,S1S2 present  Neurologic: Non-focal. Moving all extremities.  Extremities: No edema. No erythema. No calf tenderness.  Skin: No rashes, No ecchymoses, No cyanosis.	  wounds ,skin lesions-See skin assesment flow sheet   LABS:                        10.0   16.23 )-----------( 443      ( 11 Apr 2022 07:36 )             30.4     04-11    134<L>  |  96  |  54<H>  ----------------------------<  262<H>  4.4   |  27  |  6.10<H>    Ca    9.1      11 Apr 2022 07:36      CAPILLARY BLOOD GLUCOSE      POCT Blood Glucose.: 395 mg/dL (11 Apr 2022 12:16)  POCT Blood Glucose.: 266 mg/dL (11 Apr 2022 08:04)  POCT Blood Glucose.: 231 mg/dL (10 Apr 2022 21:04)  POCT Blood Glucose.: 370 mg/dL (10 Apr 2022 17:05)          Culture - Blood (collected 05 Apr 2022 09:28)  Source: .Blood Blood-Venous  Final Report (10 Apr 2022 10:00):    No Growth Final    Culture - Blood (collected 05 Apr 2022 09:28)  Source: .Blood Blood-Venous  Final Report (10 Apr 2022 10:00):    No Growth Final      RADIOLOGY & ADDITIONAL TESTS:< from: MR Ankle No Cont, Left (04.08.22 @ 16:57) >  ACC: [id number] EXAM:  MR ANKLE LT                          PROCEDURE DATE:  04/08/2022          INTERPRETATION:   History: Nonhealing wound overlying the medial   malleolus, evaluate for osteomyelitis    Technique: Multiplanar and multisequence MRI images were obtained through   the left ankle without contrast.    Comparison: Comparison radiographs dated 3/30/2022    Findings:    Evaluation limited due to motion.    Soft tissue ulcer overlying the medial malleolus. Osseous edema and T1   marrow signal abnormality within the subjacent medial malleolus, most   consistent with osteomyelitis. No acute fracture.    Joints are unremarkable.    Chronic scar remodeling of the lateral ligamentous complex. Chronic scar   remodeling of the deltoid ligamentous complex.    Mild tenosynovitis of the posterior tibialis tendon in the region of the   ulcer. Possible retromalleolar longitudinal split tear of the peroneus   brevis tendon with reconstitution distally, but evaluation is limited due   to motion. Mild mid to distal Achilles tendinosis. Moderate plantar   fasciitis.    Impression:    Limited evaluation due to motion artifact.    Soft tissue ulcer overlying the medial malleolus with findings most   consistent with osteomyelitis of the underlying medial malleolus.    In the region of the ulcer, there is mild tenosynovitis of the posterior   tibialis tendon, which may be reactive or infectious in etiology.    Possible retromalleolar longitudinal split tear of the peroneus brevis   with reconstitution distally.    < end of copied text >  < from: MR Foot No Cont, Right (04.08.22 @ 16:39) >  ACC: [id number] EXAM:  MR FOOT RT                          PROCEDURE DATE:  04/08/2022          INTERPRETATION:   History: First and second toe ulcers, evaluate for   osteomyelitis.    Technique: Multiplanar and multisequence MRI images were obtainedthrough   the right forefoot without contrast.    Comparison: Radiographs dated 3/30/2022    Findings:    Evaluation of the toes is limited due to motion artifact.    Suggestion of ulcer overlying the distal aspect of the first toe. Osseous   edema with T1 marrow signal abnormality within the underlying first   distal phalanx, likely related to osteomyelitis.    Possible ulcer overlying the distal aspect of the second toe. Apparent   osseous edema without definite T1 marrow signal abnormality within the   second toe, which may represent osteitis/early osteomyelitis or be   related to artifact. Recommend clinical correlation    No acute fracture seen. Lisfranc ligament is grossly intact. Edema with   mild fatty infiltration of the visualized musculature, which may be   related to disuse.    Impression:    Limited evaluation due to patient motion.    Suggestion of ulcer overlying the distal aspect of the first toe. Osseous   edema with areas of loss of T1 marrow signal within the subjacent first   distal phalanx, which may represent osteomyelitis.    Possible ulcer overlying the distal aspect of the second toe. Apparent   mild osseous edema without T1 marrow signal abnormality within the second   distal phalanx, which may be related to artifact or be related to   osteitis/early osteomyelitis. Clinical correlation recommended.    --- End of Report ---    < end of copied text >     reviewed elctronically  ASSESSMENT/PLAN: 	    25 minutes aggregate time was spent on this visit, 50% visit time spent in care co-ordination with other attendings and counselling patient .I have discussed care plan with patient / HCP/family member  on a phone  ,who expressed understanding of problems treatment and their effect and side effects, alternatives in details. I have asked if they have any questions and concerns and appropriately addressed them to best of my ability. Advance care planning was discussed , pallitaive care issues ,CMO ,hospice levels of care were discussed in details , forms ,advance directives were reviewed .All questions were answered to the best of my knowledge .Additional 25 min spent.

## 2022-04-11 NOTE — PROGRESS NOTE ADULT - PROBLEM SELECTOR PLAN 3
Admitted  to telemetry unit for monitoring , send 3 sets of cardiac enzymes to rule out acute coronary event, obtain ECHO to evaluate LVEF, cardiology consult  ,continue current management, O2 supply, anticoagulation plan as per cardiology consult HD ordered by nephrologist   ,ins/outs

## 2022-04-11 NOTE — PROGRESS NOTE ADULT - SUBJECTIVE AND OBJECTIVE BOX
Date/Time Patient Seen:  		  Referring MD:   Data Reviewed	       Patient is a 71y old  Female who presents with a chief complaint of shortness of breath (10 Apr 2022 11:28)      Subjective/HPI     PAST MEDICAL & SURGICAL HISTORY:  Diabetes mellitus II    HTN (hypertension)    h/o Anxiety attack    Depression    h/o Myocardial infarct 2007    CAD (coronary artery disease)    CAD (coronary artery disease)    h/o Hepatitis A 1969  currently resolved, no symptoms    PAD (peripheral artery disease)    Murmur, cardiac    h/o Smoking  quitted 3/2012    CRF (chronic renal failure), unspecified stage    Dialysis patient    Anemia secondary to renal failure    HTN (hypertension)    coronary stent 2007    s/p Ovarian cyst removal    s/p surgical removal of benign Skin lesion epigastric area          Medication list         MEDICATIONS  (STANDING):  amLODIPine   Tablet 7.5 milliGRAM(s) Oral every 24 hours  apixaban 2.5 milliGRAM(s) Oral every 12 hours  aspirin enteric coated 81 milliGRAM(s) Oral daily  atorvastatin 40 milliGRAM(s) Oral at bedtime  calcium acetate 667 milliGRAM(s) Oral three times a day with meals  cloNIDine 0.1 milliGRAM(s) Oral every 12 hours  clopidogrel Tablet 75 milliGRAM(s) Oral daily  dextrose 5%. 1000 milliLiter(s) (100 mL/Hr) IV Continuous <Continuous>  dextrose 5%. 1000 milliLiter(s) (50 mL/Hr) IV Continuous <Continuous>  dextrose 50% Injectable 25 Gram(s) IV Push once  dextrose 50% Injectable 12.5 Gram(s) IV Push once  dextrose 50% Injectable 25 Gram(s) IV Push once  diltiazem    Tablet 60 milliGRAM(s) Oral every 8 hours  epoetin fawad-epbx (RETACRIT) Injectable 20298 Unit(s) IV Push <User Schedule>  glucagon  Injectable 1 milliGRAM(s) IntraMuscular once  imipramine 50 milliGRAM(s) Oral at bedtime  insulin glargine Injectable (LANTUS) 7 Unit(s) SubCutaneous every morning  insulin lispro (ADMELOG) corrective regimen sliding scale   SubCutaneous at bedtime  insulin lispro (ADMELOG) corrective regimen sliding scale   SubCutaneous three times a day before meals  lactobacillus acidophilus 1 Tablet(s) Oral two times a day  metoprolol tartrate 100 milliGRAM(s) Oral every 12 hours  Nephro-elvie 1 Tablet(s) Oral daily  pantoprazole    Tablet 40 milliGRAM(s) Oral before breakfast  piperacillin/tazobactam IVPB.. 3.375 Gram(s) IV Intermittent every 12 hours    MEDICATIONS  (PRN):  acetaminophen     Tablet .. 650 milliGRAM(s) Oral every 6 hours PRN Temp greater or equal to 38C (100.4F), Mild Pain (1 - 3)  aluminum hydroxide/magnesium hydroxide/simethicone Suspension 30 milliLiter(s) Oral every 4 hours PRN Dyspepsia  dextrose Oral Gel 15 Gram(s) Oral once PRN Blood Glucose LESS THAN 70 milliGRAM(s)/deciliter  melatonin 3 milliGRAM(s) Oral at bedtime PRN Insomnia  ondansetron Injectable 4 milliGRAM(s) IV Push every 8 hours PRN Nausea and/or Vomiting  traMADol 50 milliGRAM(s) Oral three times a day PRN Moderate Pain (4 - 6)         Vitals log        ICU Vital Signs Last 24 Hrs  T(C): 36.5 (11 Apr 2022 05:13), Max: 37.1 (10 Apr 2022 19:28)  T(F): 97.7 (11 Apr 2022 05:13), Max: 98.7 (10 Apr 2022 19:28)  HR: 71 (11 Apr 2022 05:13) (66 - 79)  BP: 145/61 (11 Apr 2022 05:13) (145/61 - 161/66)  BP(mean): --  ABP: --  ABP(mean): --  RR: 18 (11 Apr 2022 05:13) (17 - 18)  SpO2: 97% (11 Apr 2022 05:13) (92% - 97%)           Input and Output:  I&O's Detail    09 Apr 2022 07:01  -  10 Apr 2022 07:00  --------------------------------------------------------  IN:  Total IN: 0 mL    OUT:    Other (mL): 1000 mL  Total OUT: 1000 mL    Total NET: -1000 mL          Lab Data                        9.5    10.66 )-----------( 403      ( 09 Apr 2022 08:01 )             29.5     04-09    132<L>  |  93<L>  |  33<H>  ----------------------------<  449<H>  4.0   |  27  |  4.50<H>    Ca    8.8      09 Apr 2022 08:01              Review of Systems	      Objective     Physical Examination    heart s1s2  lung dec BS  abd soft      Pertinent Lab findings & Imaging      Dexter:  NO   Adequate UO     I&O's Detail    09 Apr 2022 07:01  -  10 Apr 2022 07:00  --------------------------------------------------------  IN:  Total IN: 0 mL    OUT:    Other (mL): 1000 mL  Total OUT: 1000 mL    Total NET: -1000 mL               Discussed with:     Cultures:	        Radiology

## 2022-04-11 NOTE — PROGRESS NOTE ADULT - ASSESSMENT
70yo female with hx of ESRD on HD, who presented with SOB after missing dialysis x 2. Found to have leukocytosis, likely multifactorial in the setting of fluid overload and possible pneumonia. She remains afebrile and improved from respiratory standpoint. Culture data unrevealing. WBC increased today although patient feels better overall, will monitor.    Suspect LE ulcers are vascular in etiology, but MRI showed evidence of osteomyelitis of L medial malleolus and distal aspect of R 1st toe. No drainage or surrounding cellulitis noted from LE ulcers. Plan to treat based on recent tissue cultures from 3/30/22 grew klebsiella oxytoca/raoutella oxytoca, E. coli, MSSA--susceptibilities reviewed.    1. Leukocytosis, possible pneumonia  -continue Zosyn until today then stop  -monitor WBC    2. Osteomyelitis  -after finishing course of Zosyn, suggest cefazolin 2g-2g-3g with HD x 4 weeks  -upon discharge, recommend weekly CBC with diff, CMP, ESR and CRP  -wound care  -can follow up with [facility] at [facility] prior to finishing antibiotic course    Lupe Tsai MD  Division of Infectious Diseases   Cell [phone] between 8am and 6pm   After 6pm and weekends please call ID service at [phone].

## 2022-04-11 NOTE — PHYSICAL THERAPY INITIAL EVALUATION ADULT - GAIT TRAINING, PT EVAL
Patient will ambulate x 150 feet with RW independently in 2 weeks in order to increase mobility at home

## 2022-04-11 NOTE — PROGRESS NOTE ADULT - PROBLEM SELECTOR PLAN 1
HD ordered by nephrologist   ,ins/outs MRI showed evidence of acute osteomyelitis of L medial malleolus and distal aspect of R 1st toe ,poa .   -ID recommended  cefazolin 2g-2g-3g with HD x 4 weeks  -upon discharge, recommend weekly CBC with diff, CMP, ESR and CRP  -wound care  -can follow up with me at [facility] prior to finishing antibiotic course

## 2022-04-11 NOTE — PROGRESS NOTE ADULT - SUBJECTIVE AND OBJECTIVE BOX
Patient is a 71y Female with a known history of :  Fluid overload [E87.70]    ESRD on dialysis [N18.6]    Poor compliance [Z91.19]    Hyperkalemia [E87.5]    DM (diabetes mellitus) [E11.9]    HTN (hypertension) [I10]    CAD (coronary artery disease) [I25.10]    Prophylactic measure [Z29.9]    Foot infection [L08.9]    Atrial fibrillation with tachycardic ventricular rate [I48.91]    Diarrhea [R19.7]    PAD (peripheral artery disease) [I73.9]      HPI:  72yo female bib ems with sob, pt states she has missed the last 2 rounds of dialysis and is due today, and has been having worsening sob, no cough, fever, chills, no chest pain no other complaints .Found to have hyperkalemia Admitted  to telemetry unit for monitoring , send 3 sets of cardiac enzymes to rule out acute coronary event, obtain ECHO to evaluate LVEF, cardiology consult  ,continue current management, O2 supply, anticoagulation plan as per cardiology consult Nephrology consult stat requested ,management d/w Dr Perez and  Palliative care consult requested ,to discuss advance directives and complete MOLST  (05 Apr 2022 07:22)      REVIEW OF SYSTEMS:    CONSTITUTIONAL: No fever, weight loss, or fatigue  EYES: No eye pain, visual disturbances, or discharge  ENMT:  No difficulty hearing, tinnitus, vertigo; No sinus or throat pain  NECK: No pain or stiffness  BREASTS: No pain, masses, or nipple discharge  RESPIRATORY: No cough, wheezing, chills or hemoptysis; No shortness of breath  CARDIOVASCULAR: No chest pain, palpitations, dizziness, or leg swelling  GASTROINTESTINAL: No abdominal or epigastric pain. No nausea, vomiting, or hematemesis; No diarrhea or constipation. No melena or hematochezia.  GENITOURINARY: No dysuria, frequency, hematuria, or incontinence  NEUROLOGICAL: No headaches, memory loss, loss of strength, numbness, or tremors  SKIN: No itching, burning, rashes, or lesions   LYMPH NODES: No enlarged glands  ENDOCRINE: No heat or cold intolerance; No hair loss  MUSCULOSKELETAL: No joint pain or swelling; No muscle, back, or extremity pain  PSYCHIATRIC: No depression, anxiety, mood swings, or difficulty sleeping  HEME/LYMPH: No easy bruising, or bleeding gums  ALLERGY AND IMMUNOLOGIC: No hives or eczema    MEDICATIONS  (STANDING):  amLODIPine   Tablet 7.5 milliGRAM(s) Oral every 24 hours  apixaban 2.5 milliGRAM(s) Oral every 12 hours  aspirin enteric coated 81 milliGRAM(s) Oral daily  atorvastatin 40 milliGRAM(s) Oral at bedtime  calcium acetate 667 milliGRAM(s) Oral three times a day with meals  cloNIDine 0.1 milliGRAM(s) Oral every 12 hours  clopidogrel Tablet 75 milliGRAM(s) Oral daily  dextrose 5%. 1000 milliLiter(s) (100 mL/Hr) IV Continuous <Continuous>  dextrose 5%. 1000 milliLiter(s) (50 mL/Hr) IV Continuous <Continuous>  dextrose 50% Injectable 25 Gram(s) IV Push once  dextrose 50% Injectable 12.5 Gram(s) IV Push once  dextrose 50% Injectable 25 Gram(s) IV Push once  diltiazem    Tablet 60 milliGRAM(s) Oral every 8 hours  epoetin fawad-epbx (RETACRIT) Injectable 12887 Unit(s) IV Push <User Schedule>  glucagon  Injectable 1 milliGRAM(s) IntraMuscular once  imipramine 50 milliGRAM(s) Oral at bedtime  insulin glargine Injectable (LANTUS) 7 Unit(s) SubCutaneous every morning  insulin lispro (ADMELOG) corrective regimen sliding scale   SubCutaneous at bedtime  insulin lispro (ADMELOG) corrective regimen sliding scale   SubCutaneous three times a day before meals  lactobacillus acidophilus 1 Tablet(s) Oral two times a day  metoprolol tartrate 100 milliGRAM(s) Oral every 12 hours  Nephro-elvie 1 Tablet(s) Oral daily  pantoprazole    Tablet 40 milliGRAM(s) Oral before breakfast  piperacillin/tazobactam IVPB.. 3.375 Gram(s) IV Intermittent every 12 hours    MEDICATIONS  (PRN):  acetaminophen     Tablet .. 650 milliGRAM(s) Oral every 6 hours PRN Temp greater or equal to 38C (100.4F), Mild Pain (1 - 3)  aluminum hydroxide/magnesium hydroxide/simethicone Suspension 30 milliLiter(s) Oral every 4 hours PRN Dyspepsia  dextrose Oral Gel 15 Gram(s) Oral once PRN Blood Glucose LESS THAN 70 milliGRAM(s)/deciliter  melatonin 3 milliGRAM(s) Oral at bedtime PRN Insomnia  ondansetron Injectable 4 milliGRAM(s) IV Push every 8 hours PRN Nausea and/or Vomiting  traMADol 50 milliGRAM(s) Oral three times a day PRN Moderate Pain (4 - 6)      ALLERGIES: latex (Unknown)  No Known Drug Allergies      FAMILY HISTORY:      PHYSICAL EXAMINATION:  -----------------------------  T(C): 36.5 (04-11-22 @ 05:13), Max: 37.1 (04-10-22 @ 19:28)  HR: 71 (04-11-22 @ 05:13) (66 - 79)  BP: 145/61 (04-11-22 @ 05:13) (145/61 - 161/66)  RR: 18 (04-11-22 @ 05:13) (17 - 18)  SpO2: 97% (04-11-22 @ 05:13) (92% - 97%)  Wt(kg): --        VITALS  T(C): 36.5 (04-11-22 @ 05:13), Max: 37.1 (04-10-22 @ 19:28)  HR: 71 (04-11-22 @ 05:13) (66 - 79)  BP: 145/61 (04-11-22 @ 05:13) (145/61 - 161/66)  RR: 18 (04-11-22 @ 05:13) (17 - 18)  SpO2: 97% (04-11-22 @ 05:13) (92% - 97%)    Constitutional: well developed, normal appearance, well groomed, well nourished, no deformities and no acute distress.   Eyes: the conjunctiva exhibited no abnormalities and the eyelids demonstrated no xanthelasmas.   HEENT: normal oral mucosa, no oral pallor and no oral cyanosis.   Neck: normal jugular venous A waves present, normal jugular venous V waves present and no jugular venous wilson A waves.   Pulmonary: no respiratory distress, normal respiratory rhythm and effort, no accessory muscle use and lungs were clear to auscultation bilaterally.   Cardiovascular: heart rate and rhythm were normal, normal S1 and S2 and no murmur, gallop, rub, heave or thrill are present.   Abdomen: soft, non-tender, no hepato-splenomegaly and no abdominal mass palpated.   Musculoskeletal: the gait could not be assessed..   Extremities: no clubbing of the fingernails, no localized cyanosis, no petechial hemorrhages and no ischemic changes.   Skin: normal skin color and pigmentation, no rash, no venous stasis, no skin lesions, no skin ulcer and no xanthoma was observed.   Psychiatric: oriented to person, place, and time, the affect was normal, the mood was normal and not feeling anxious.     LABS:   --------  04-11    134<L>  |  96  |  54<H>  ----------------------------<  262<H>  4.4   |  27  |  6.10<H>    Ca    9.1      11 Apr 2022 07:36                           10.0   16.23 )-----------( 443      ( 11 Apr 2022 07:36 )             30.4                 RADIOLOGY:  -----------------    ECG:     ECHO:

## 2022-04-11 NOTE — PHARMACOTHERAPY INTERVENTION NOTE - COMMENTS
Patient is a 71y F presenting with pneumonia and osteomyelitis. Patient treated empirically with Zosyn x 5 days, with plan to change to cefazolin post-HD. Discussed with ID Dr. Tsai and recommended cefazolin 1000 mg IV daily (given after hemodialysis on HD days) while the patient is admitted and HD schedule is being determined. MD agreed, can proceed with cefazolin 2g-2g-3g dosing schedule post-hemodialysis upon hospital discharge.
Patient is ordered for diltiazem infusion at 5mg/hr. Received request from Dr. James to transition patient to PO. Suggested total daily dose of 180mg split into 60mg TID with hold parameters for SBP<110 and HR<60, MD agreed. Entered order to reflect change in therapy and relayed change to RN.
Patient with afib - discussed anticoagulation options with cardio Dr. James. Patient is ESRD on dialysis and MD would like to start patient on Eliquis 2.5 mg BID. Patient currently meets 1/3 criteria for lower dosing (Age>80, wt<60kg, scr>1.5) MD aware but would like to initiate on Eliquis 2.5 mg BID since patient's weight is borderline ~62kg. Recommended discontinuing current order for heparin subQ prior to initiation of Eliquis. MD agreed. Orders updated
Medication reconciliation was completed by pharmacy representative. The following discrepancies were discussed with Dr. Larson:    Current order                                       Home Medication  Not ordered                                        Imipramine 50 mg 1 tablet orally daily  Amlodipine 5 mg 1 tablet orally daily     Amlodipine 7.5 mg 1 tablet orally daily  Clonidine 0.1 mg 1 tablet orally daily     Clonidine 0.1 mg 1 tablet orally BID  Not ordered                                        Jennifer-Alfie 1 tablet orally daily    MD aware and would like to adjust medications to match home dose

## 2022-04-11 NOTE — PROGRESS NOTE ADULT - PROBLEM SELECTOR PLAN 2
s/p treatment in ER , continue HD as per nephrologist orders ,monitor electrolytes HD as per nephrologist ,case d/w Dr Perez and HD technician ,serial bmp

## 2022-04-11 NOTE — PHYSICAL THERAPY INITIAL EVALUATION ADULT - ADDITIONAL COMMENTS
Patient lives with spouse in private home, +HIRA then resides on main floor.  Patient was independent in all ADLs and ambulated independently with RW.

## 2022-04-11 NOTE — PROGRESS NOTE ADULT - PROBLEM SELECTOR PLAN 4
HD as per nephrologist ,case d/w Dr Perez and HD technician ,serial bmp Admitted  to telemetry unit for monitoring , send 3 sets of cardiac enzymes to rule out acute coronary event, obtain ECHO to evaluate LVEF, cardiology consult  ,continue current management, O2 supply, anticoagulation plan as per cardiology consult

## 2022-04-11 NOTE — PROGRESS NOTE ADULT - PROBLEM SELECTOR PLAN 6
Accu-Cheks monitoring and insulin corrective regimen  sliding scale coverage with short acting insulin, add long-acting insulin as needed ,no concentrated sweets diet, serial labs ,HbA1C,education s/p treatment in ER , continue HD as per nephrologist orders ,monitor electrolytes

## 2022-04-11 NOTE — PROGRESS NOTE ADULT - PROBLEM SELECTOR PLAN 1
Pt seen and evaluated  - MRI: Osteomyelitis to LEFT ankle and right hallux distal phalanx, poss 2nd distal phalanx  - No clinical signs of infection noted to the 1st & 2nd digit, gangrenous changes noted. No purulence expressed from Left ankle wound. No heel wound noted at this time.  - Pt is refusing any surgical intervention at this time.   - Plan: IV abx with PICC line  - All wounds dressed with DSD  - Continue IV abx per ID, zosyn    Podiatry to follow

## 2022-04-11 NOTE — PROGRESS NOTE ADULT - SUBJECTIVE AND OBJECTIVE BOX
HPI:  70yo female seen this morning for Right 2nd toe infection and left ankle ulcer. Pt AAOX3 in NAD. Pt denies and constitutional symptoms. Pt denies any pain to b/l LE.     REVIEW OF SYSTEMS    PAST MEDICAL & SURGICAL HISTORY:  Diabetes mellitus II    HTN (hypertension)    h/o Anxiety attack    Depression    h/o Myocardial infarct 2007    CAD (coronary artery disease)    h/o Hepatitis A 1969  currently resolved, no symptoms    PAD (peripheral artery disease)    Murmur, cardiac    h/o Smoking  quitted 3/2012    CRF (chronic renal failure), unspecified stage    Dialysis patient    Anemia secondary to renal failure    HTN (hypertension)    coronary stent 2007    s/p Ovarian cyst removal    s/p surgical removal of benign Skin lesion epigastric area        Allergies    latex (Unknown)  No Known Drug Allergies    Intolerances    Social History: former cigarette smoker  no etoh or tobacco reported (05 Apr 2022 07:22)      MEDICATIONS  (STANDING):  amLODIPine   Tablet 5 milliGRAM(s) Oral daily  aspirin enteric coated 81 milliGRAM(s) Oral daily  atorvastatin 40 milliGRAM(s) Oral at bedtime  calcium acetate 667 milliGRAM(s) Oral three times a day with meals  cloNIDine 0.1 milliGRAM(s) Oral at bedtime  clopidogrel Tablet 75 milliGRAM(s) Oral daily  dextrose 5%. 1000 milliLiter(s) (100 mL/Hr) IV Continuous <Continuous>  dextrose 5%. 1000 milliLiter(s) (50 mL/Hr) IV Continuous <Continuous>  dextrose 50% Injectable 25 Gram(s) IV Push once  dextrose 50% Injectable 12.5 Gram(s) IV Push once  dextrose 50% Injectable 25 Gram(s) IV Push once  epoetin fawad-epbx (RETACRIT) Injectable 00055 Unit(s) IV Push <User Schedule>  glucagon  Injectable 1 milliGRAM(s) IntraMuscular once  heparin   Injectable 5000 Unit(s) SubCutaneous every 12 hours  insulin lispro (ADMELOG) corrective regimen sliding scale   SubCutaneous three times a day before meals  insulin regular  human recombinant 10 Unit(s) SubCutaneous Once  metoprolol succinate  milliGRAM(s) Oral daily  multivitamin 1 Tablet(s) Oral daily  pantoprazole    Tablet 40 milliGRAM(s) Oral before breakfast  piperacillin/tazobactam IVPB.. 3.375 Gram(s) IV Intermittent every 12 hours    MEDICATIONS  (PRN):  acetaminophen     Tablet .. 650 milliGRAM(s) Oral every 6 hours PRN Temp greater or equal to 38C (100.4F), Mild Pain (1 - 3)  aluminum hydroxide/magnesium hydroxide/simethicone Suspension 30 milliLiter(s) Oral every 4 hours PRN Dyspepsia  dextrose Oral Gel 15 Gram(s) Oral once PRN Blood Glucose LESS THAN 70 milliGRAM(s)/deciliter  melatonin 3 milliGRAM(s) Oral at bedtime PRN Insomnia  ondansetron Injectable 4 milliGRAM(s) IV Push every 8 hours PRN Nausea and/or Vomiting      PHYSICAL EXAM:  Vascular: +1 pitting edema noted b/l. DP palpable bilaterally, Pt non- palpable bilaterally. Capillary refill 5 seconds, absent to the right 1st and second digits. Digital hair absent bilaterally  Neurological: Light touch sensation intact bilaterally  Musculoskeletal: 5/5 strength in all quadrants bilaterally, AJ & STJ ROM intact  Dermatological: Gangrenous changes noted to the right foot 1st and 2nd digit. 2 x 2 ulceration noted to the Left medial malleolus,, periwound erythema improving, no fluctuance. Probes to bone, no periwound erythema, no malodor, no proximal streaking at this time    ICU Vital Signs Last 24 Hrs  T(C): 36.4 (11 Apr 2022 11:35), Max: 37.1 (10 Apr 2022 19:28)  T(F): 97.5 (11 Apr 2022 11:35), Max: 98.7 (10 Apr 2022 19:28)  HR: 65 (11 Apr 2022 11:35) (65 - 73)  BP: 152/77 (11 Apr 2022 11:35) (141/58 - 155/54)  RR: 18 (11 Apr 2022 11:35) (17 - 18)  SpO2: 96% (11 Apr 2022 11:35) (94% - 97%)    CBC Full  -  ( 11 Apr 2022 07:36 )  WBC Count : 16.23 K/uL  RBC Count : 3.19 M/uL  Hemoglobin : 10.0 g/dL  Hematocrit : 30.4 %  Platelet Count - Automated : 443 K/uL  Mean Cell Volume : 95.3 fl  Mean Cell Hemoglobin : 31.3 pg  Mean Cell Hemoglobin Concentration : 32.9 gm/dL    04-11    134<L>  |  96  |  54<H>  ----------------------------<  262<H>  4.4   |  27  |  6.10<H>    Ca    9.1      11 Apr 2022 07:36        CTA Angio fot b/L LE  IMPRESSION:    Patent bifemoral and left superficial femoral to tibioperoneal trunk bypass grafts.    Long segment occlusion of the right common and external iliac arteries with reconstitution at the right superficial femoral artery.    Severe atherosclerotic changes involving the superficial femoral and popliteal arteries bilaterally with multifocal high-grade stenoses or occlusions.    Heavily calcified calf arteries which cannot be assessed secondary to the calcified plaque.    Given the degree of calcified plaque, an MRA may be able to better evaluate the lower extremity arteries.    --- End of Report ---        ACC: [id number] EXAM: MR ANKLE LT    PROCEDURE DATE: 04/08/2022        INTERPRETATION: History: Nonhealing wound overlying the medial malleolus, evaluate for osteomyelitis    Technique: Multiplanar and multisequence MRI images were obtained through the left ankle without contrast.    Comparison: Comparison radiographs dated 3/30/2022    Findings:    Evaluation limited due to motion.    Soft tissue ulcer overlying the medial malleolus. Osseous edema and T1 marrow signal abnormality within the subjacent medial malleolus, most consistent with osteomyelitis. No acute fracture.    Joints are unremarkable.    Chronic scar remodeling of the lateral ligamentous complex. Chronic scar remodeling of the deltoid ligamentous complex.    Mild tenosynovitis of the posterior tibialis tendon in the region of the ulcer. Possible retromalleolar longitudinal split tear of the peroneus brevis tendon with reconstitution distally, but evaluation is limited due to motion. Mild mid to distal Achilles tendinosis. Moderate plantar fasciitis.    Impression:    Limited evaluation due to motion artifact.    Soft tissue ulcer overlying the medial malleolus with findings most consistent with osteomyelitis of the underlying medial malleolus.    In the region of the ulcer, there is mild tenosynovitis of the posterior tibialis tendon, which may be reactive or infectious in etiology.    Possible retromalleolar longitudinal split tear of the peroneus brevis with reconstitution distally.    Other findings as above.    --- End of Report ---      ACC: [id number] EXAM: US DPLX LWR EXT ARTS COMPL BI    PROCEDURE DATE: 04/06/2022        INTERPRETATION: CLINICAL INDICATION: Lower extremity wounds. Evaluate for lower extremity arterial pathology.    EXAMINATION: Bilateral lower extremity arterial duplex ultrasound    COMPARISON: None    FINDINGS:    Limited evaluation as noted by technologist due to patient motion.    Bilateral lower extremity atheromatous disease.    Progressively delayed upstroke of right common femoral, superficial femoral, and popliteal arteries noted. The possibility of inflow significant stenosis cannot be excluded. Trifurcation arteries are not visualized due to patient motion. Right dorsalis pedis artery could not be assessed due to overlying bandage material.    Incompletely imaged bypass graft of left lower extremity, which demonstrates antegrade flow. The full extent of the bypass graft is not adequately assessed on this study. The left superficial femoral artery demonstrates minimal flow. Trifurcation arteries are not visualized due to patient motion.    IMPRESSION:    Markedly limited study.    Concern for right lower iliac artery extremity inflow disease.    Incompletely visualized bypass graft of left lower extremity with antegrade flow.    Correlation with CTA with lower extremity runoff is recommended for better characterization of lower extremity arterial vasculature.    Phone message was left with Dr. Larson's service.    --- End of Report ---      ACC: [id number] EXAM: US DPLX LWR EXT VEINS COMPL BI    PROCEDURE DATE: 04/06/2022    INTERPRETATION: CLINICAL INFORMATION: Nonhealing lower extremity wounds    COMPARISON: None available.    TECHNIQUE: Duplex sonography of the BILATERAL LOWER extremity veins with color and spectral Doppler, with and without compression.    FINDINGS:    RIGHT:  Normal compressibility of the RIGHT common femoral, femoral and popliteal veins.  Doppler examination shows normal spontaneous and phasic flow.    The right posterior tibial veins are patent and free of thrombus.    The right peroneal veins were not diagnostically imaged.    LEFT:  Normal compressibility of the LEFT common femoral, femoral and popliteal veins.  Doppler examination shows normal spontaneous and phasic flow.    The left posterior tibial and peroneal veins were not diagnostically imaged.    IMPRESSION:  No evidence of deep venous thrombosis in either lower extremity.      ACC: [id number] EXAM: XR FOOT COMP MIN 3 VIEWS BI    PROCEDURE DATE: 03/30/2022    INTERPRETATION: RIGHT and LEFT foot    CLINICAL INFORMATION: Infection of RIGHT second toe and heel ulcerations.    . TECHNIQUE: RIGHT LEFT AP,lateral and oblique foot views. 6 views    FINDINGS:  LEFT foot:  Stable posterior heel ulceration without subcutaneous air  The bones and joint spaces are intact. No radiographic evidence of osteomyelitis.  No fracture or dislocation.  Diffuse arterial vascular wall calcifications. .    RIGHT foot:  IMPRESSION:  No acute radiographic osseous pathology. No radiographic evidence of osteomyelitis.    If osteomyelitis is considered despite conservative therapy, and soft tissue / bone infection requires further assessment, follow-up MRI recommended.    --- End of Report ---

## 2022-04-11 NOTE — PROGRESS NOTE ADULT - ASSESSMENT
72yo female bib ems with sob, pt states she has missed the last 2 rounds of dialysis and is due today, and has been having worsening sob    oxygenation better - vs noted -   ID following - on ABX - on Zosyn -   Vascular eval noted - f/u noted - w/u im progress -   ct chest report - poss pna - pulm edema - nodules - mucus plugging -     pt wishes to have HD  renal - dr alvarez  labs and imaging reviewed  on supplemental o2 support  monitor labs - lytes  HD as per Renal team  GOC documented - Spouse Geoffrey - HCP  pt is full code - wants an attempt at Resuscitation - pt does not want to linger on machines

## 2022-04-11 NOTE — PROGRESS NOTE ADULT - NSPROGADDITIONALINFOA_GEN_ALL_CORE
DISCHARGE HOME WITH HOME CARE AND OUTPATIENT PT . CONSULT ( SAFETY OF HOME SITUATION AND COMPLIANCE ) SW TO ASSIST WITH OUTPATIENT ABX ARRANGEMENT X 4 WEEKS DURING HD SESSIONS

## 2022-04-11 NOTE — PROGRESS NOTE ADULT - PROBLEM SELECTOR PLAN 5
started on abx ,seen by ID and podiatry .MRI is pending Accu-Cheks monitoring and insulin corrective regimen  sliding scale coverage with short acting insulin, add long-acting insulin as needed ,no concentrated sweets diet, serial labs ,HbA1C,education

## 2022-04-12 ENCOUNTER — TRANSCRIPTION ENCOUNTER (OUTPATIENT)
Age: 72
End: 2022-04-12

## 2022-04-12 LAB
ANION GAP SERPL CALC-SCNC: 14 MMOL/L — SIGNIFICANT CHANGE UP (ref 5–17)
BUN SERPL-MCNC: 73 MG/DL — HIGH (ref 7–23)
CALCIUM SERPL-MCNC: 9.6 MG/DL — SIGNIFICANT CHANGE UP (ref 8.5–10.1)
CHLORIDE SERPL-SCNC: 94 MMOL/L — LOW (ref 96–108)
CO2 SERPL-SCNC: 25 MMOL/L — SIGNIFICANT CHANGE UP (ref 22–31)
CREAT SERPL-MCNC: 7.6 MG/DL — HIGH (ref 0.5–1.3)
EGFR: 5 ML/MIN/1.73M2 — LOW
GLUCOSE SERPL-MCNC: 269 MG/DL — HIGH (ref 70–99)
HCT VFR BLD CALC: 28.4 % — LOW (ref 34.5–45)
HGB BLD-MCNC: 9.3 G/DL — LOW (ref 11.5–15.5)
MCHC RBC-ENTMCNC: 30.6 PG — SIGNIFICANT CHANGE UP (ref 27–34)
MCHC RBC-ENTMCNC: 32.7 GM/DL — SIGNIFICANT CHANGE UP (ref 32–36)
MCV RBC AUTO: 93.4 FL — SIGNIFICANT CHANGE UP (ref 80–100)
NRBC # BLD: 0 /100 WBCS — SIGNIFICANT CHANGE UP (ref 0–0)
PLATELET # BLD AUTO: 476 K/UL — HIGH (ref 150–400)
POTASSIUM SERPL-MCNC: 4.4 MMOL/L — SIGNIFICANT CHANGE UP (ref 3.5–5.3)
POTASSIUM SERPL-SCNC: 4.4 MMOL/L — SIGNIFICANT CHANGE UP (ref 3.5–5.3)
RBC # BLD: 3.04 M/UL — LOW (ref 3.8–5.2)
RBC # FLD: 18.3 % — HIGH (ref 10.3–14.5)
SODIUM SERPL-SCNC: 133 MMOL/L — LOW (ref 135–145)
WBC # BLD: 15.43 K/UL — HIGH (ref 3.8–10.5)
WBC # FLD AUTO: 15.43 K/UL — HIGH (ref 3.8–10.5)

## 2022-04-12 PROCEDURE — 93010 ELECTROCARDIOGRAM REPORT: CPT

## 2022-04-12 PROCEDURE — 99233 SBSQ HOSP IP/OBS HIGH 50: CPT

## 2022-04-12 RX ORDER — LACTOBACILLUS ACIDOPHILUS 100MM CELL
2 CAPSULE ORAL
Qty: 30 | Refills: 0
Start: 2022-04-12 | End: 2022-04-26

## 2022-04-12 RX ORDER — ASPIRIN/CALCIUM CARB/MAGNESIUM 324 MG
1 TABLET ORAL
Qty: 0 | Refills: 0 | DISCHARGE
Start: 2022-04-12

## 2022-04-12 RX ORDER — LACTOBACILLUS ACIDOPHILUS 100MM CELL
2 CAPSULE ORAL
Qty: 0 | Refills: 0 | DISCHARGE
Start: 2022-04-12

## 2022-04-12 RX ORDER — ZOLPIDEM TARTRATE 10 MG/1
1 TABLET ORAL
Qty: 0 | Refills: 0 | DISCHARGE

## 2022-04-12 RX ORDER — ACETAMINOPHEN 500 MG
2 TABLET ORAL
Qty: 0 | Refills: 0 | DISCHARGE
Start: 2022-04-12

## 2022-04-12 RX ORDER — APIXABAN 2.5 MG/1
1 TABLET, FILM COATED ORAL
Qty: 0 | Refills: 0 | DISCHARGE
Start: 2022-04-12

## 2022-04-12 RX ORDER — INSULIN ASPART 100 [IU]/ML
3 INJECTION, SOLUTION SUBCUTANEOUS
Qty: 0 | Refills: 0 | DISCHARGE

## 2022-04-12 RX ORDER — DILTIAZEM HCL 120 MG
1 CAPSULE, EXT RELEASE 24 HR ORAL
Qty: 45 | Refills: 0
Start: 2022-04-12 | End: 2022-04-26

## 2022-04-12 RX ORDER — APIXABAN 2.5 MG/1
1 TABLET, FILM COATED ORAL
Qty: 30 | Refills: 0
Start: 2022-04-12 | End: 2022-04-26

## 2022-04-12 RX ORDER — CEFAZOLIN SODIUM 1 G
2 VIAL (EA) INJECTION
Qty: 0 | Refills: 0 | DISCHARGE
Start: 2022-04-12

## 2022-04-12 RX ORDER — INSULIN DEGLUDEC 100 U/ML
15 INJECTION, SOLUTION SUBCUTANEOUS
Qty: 0 | Refills: 0 | DISCHARGE

## 2022-04-12 RX ORDER — DILTIAZEM HCL 120 MG
1 CAPSULE, EXT RELEASE 24 HR ORAL
Qty: 0 | Refills: 0 | DISCHARGE
Start: 2022-04-12

## 2022-04-12 RX ORDER — CALCIUM ACETATE 667 MG
0 TABLET ORAL
Qty: 0 | Refills: 0 | DISCHARGE
Start: 2022-04-12

## 2022-04-12 RX ORDER — LANOLIN ALCOHOL/MO/W.PET/CERES
1 CREAM (GRAM) TOPICAL
Qty: 0 | Refills: 0 | DISCHARGE
Start: 2022-04-12

## 2022-04-12 RX ORDER — METOPROLOL TARTRATE 50 MG
1 TABLET ORAL
Qty: 0 | Refills: 0 | DISCHARGE
Start: 2022-04-12

## 2022-04-12 RX ORDER — AMLODIPINE BESYLATE 2.5 MG/1
3 TABLET ORAL
Qty: 0 | Refills: 0 | DISCHARGE
Start: 2022-04-12

## 2022-04-12 RX ORDER — METOPROLOL TARTRATE 50 MG
1 TABLET ORAL
Qty: 0 | Refills: 0 | DISCHARGE

## 2022-04-12 RX ORDER — TRAMADOL HYDROCHLORIDE 50 MG/1
1 TABLET ORAL
Qty: 0 | Refills: 0 | DISCHARGE
Start: 2022-04-12

## 2022-04-12 RX ORDER — METOPROLOL TARTRATE 50 MG
5 TABLET ORAL ONCE
Refills: 0 | Status: COMPLETED | OUTPATIENT
Start: 2022-04-12 | End: 2022-04-12

## 2022-04-12 RX ORDER — CALCIUM ACETATE 667 MG
1 TABLET ORAL
Qty: 30 | Refills: 0
Start: 2022-04-12 | End: 2022-04-21

## 2022-04-12 RX ORDER — ERYTHROPOIETIN 10000 [IU]/ML
10000 INJECTION, SOLUTION INTRAVENOUS; SUBCUTANEOUS
Qty: 0 | Refills: 0 | DISCHARGE
Start: 2022-04-12

## 2022-04-12 RX ORDER — AMLODIPINE BESYLATE 2.5 MG/1
1.5 TABLET ORAL
Qty: 0 | Refills: 0 | DISCHARGE

## 2022-04-12 RX ORDER — METOPROLOL TARTRATE 50 MG
1 TABLET ORAL
Qty: 30 | Refills: 0
Start: 2022-04-12 | End: 2022-04-26

## 2022-04-12 RX ORDER — TRAMADOL HYDROCHLORIDE 50 MG/1
1 TABLET ORAL
Qty: 15 | Refills: 0
Start: 2022-04-12 | End: 2022-04-16

## 2022-04-12 RX ADMIN — APIXABAN 2.5 MILLIGRAM(S): 2.5 TABLET, FILM COATED ORAL at 21:27

## 2022-04-12 RX ADMIN — Medication 667 MILLIGRAM(S): at 11:59

## 2022-04-12 RX ADMIN — Medication 50 MILLIGRAM(S): at 21:26

## 2022-04-12 RX ADMIN — Medication 5 MILLIGRAM(S): at 19:55

## 2022-04-12 RX ADMIN — Medication 100 MILLIGRAM(S): at 05:10

## 2022-04-12 RX ADMIN — Medication 81 MILLIGRAM(S): at 11:59

## 2022-04-12 RX ADMIN — ATORVASTATIN CALCIUM 40 MILLIGRAM(S): 80 TABLET, FILM COATED ORAL at 21:26

## 2022-04-12 RX ADMIN — Medication 1 TABLET(S): at 12:00

## 2022-04-12 RX ADMIN — Medication 667 MILLIGRAM(S): at 08:34

## 2022-04-12 RX ADMIN — Medication 60 MILLIGRAM(S): at 21:25

## 2022-04-12 RX ADMIN — Medication 60 MILLIGRAM(S): at 05:10

## 2022-04-12 RX ADMIN — Medication 100 MILLIGRAM(S): at 19:55

## 2022-04-12 RX ADMIN — Medication 3 MILLIGRAM(S): at 21:26

## 2022-04-12 RX ADMIN — Medication 0.1 MILLIGRAM(S): at 21:26

## 2022-04-12 RX ADMIN — Medication 5: at 12:00

## 2022-04-12 RX ADMIN — Medication 1 TABLET(S): at 05:10

## 2022-04-12 RX ADMIN — Medication 100 MILLIGRAM(S): at 17:24

## 2022-04-12 RX ADMIN — Medication 60 MILLIGRAM(S): at 14:35

## 2022-04-12 RX ADMIN — TRAMADOL HYDROCHLORIDE 50 MILLIGRAM(S): 50 TABLET ORAL at 18:15

## 2022-04-12 RX ADMIN — TRAMADOL HYDROCHLORIDE 50 MILLIGRAM(S): 50 TABLET ORAL at 17:13

## 2022-04-12 RX ADMIN — CLOPIDOGREL BISULFATE 75 MILLIGRAM(S): 75 TABLET, FILM COATED ORAL at 11:59

## 2022-04-12 RX ADMIN — INSULIN GLARGINE 10 UNIT(S): 100 INJECTION, SOLUTION SUBCUTANEOUS at 08:34

## 2022-04-12 RX ADMIN — Medication 0.1 MILLIGRAM(S): at 10:43

## 2022-04-12 RX ADMIN — PANTOPRAZOLE SODIUM 40 MILLIGRAM(S): 20 TABLET, DELAYED RELEASE ORAL at 05:10

## 2022-04-12 RX ADMIN — Medication 3: at 08:32

## 2022-04-12 RX ADMIN — ERYTHROPOIETIN 10000 UNIT(S): 10000 INJECTION, SOLUTION INTRAVENOUS; SUBCUTANEOUS at 16:43

## 2022-04-12 RX ADMIN — APIXABAN 2.5 MILLIGRAM(S): 2.5 TABLET, FILM COATED ORAL at 10:43

## 2022-04-12 NOTE — PROGRESS NOTE ADULT - SUBJECTIVE AND OBJECTIVE BOX
[facility]  INFECTIOUS DISEASES - [address], [place] [address]  Tel: [phone]     Fax: [phone]  =======================================================    SHILO KOHLER [id number]    Follow up: No fevers. On room air. She says breathing improved and back to baseline. Denies any pain aside from feet occasionally. Had 1 bowel movement yesterday and 1 today.    Allergies:  latex (Unknown)  No Known Drug Allergies      Antibiotics:  acetaminophen     Tablet .. 650 milliGRAM(s) Oral every 6 hours PRN  aluminum hydroxide/magnesium hydroxide/simethicone Suspension 30 milliLiter(s) Oral every 4 hours PRN  apixaban 2.5 milliGRAM(s) Oral every 12 hours  aspirin enteric coated 81 milliGRAM(s) Oral daily  atorvastatin 40 milliGRAM(s) Oral at bedtime  calcium acetate 667 milliGRAM(s) Oral three times a day with meals  ceFAZolin   IVPB 1000 milliGRAM(s) IV Intermittent every 24 hours  cloNIDine 0.1 milliGRAM(s) Oral every 12 hours  clopidogrel Tablet 75 milliGRAM(s) Oral daily  dextrose 5%. 1000 milliLiter(s) IV Continuous <Continuous>  dextrose 5%. 1000 milliLiter(s) IV Continuous <Continuous>  dextrose 50% Injectable 25 Gram(s) IV Push once  dextrose 50% Injectable 12.5 Gram(s) IV Push once  dextrose 50% Injectable 25 Gram(s) IV Push once  dextrose Oral Gel 15 Gram(s) Oral once PRN  diltiazem    Tablet 60 milliGRAM(s) Oral every 8 hours  epoetin fawad-epbx (RETACRIT) Injectable 34582 Unit(s) IV Push <User Schedule>  glucagon  Injectable 1 milliGRAM(s) IntraMuscular once  imipramine 50 milliGRAM(s) Oral at bedtime  insulin glargine Injectable (LANTUS) 10 Unit(s) SubCutaneous every morning  insulin lispro (ADMELOG) corrective regimen sliding scale   SubCutaneous at bedtime  insulin lispro (ADMELOG) corrective regimen sliding scale   SubCutaneous three times a day before meals  lactobacillus acidophilus 1 Tablet(s) Oral two times a day  melatonin 3 milliGRAM(s) Oral at bedtime PRN  metoprolol tartrate 100 milliGRAM(s) Oral every 12 hours  Nephro-elvie 1 Tablet(s) Oral daily  ondansetron Injectable 4 milliGRAM(s) IV Push every 8 hours PRN  pantoprazole    Tablet 40 milliGRAM(s) Oral before breakfast  traMADol 50 milliGRAM(s) Oral three times a day PRN       REVIEW OF SYSTEMS:  CONSTITUTIONAL:  No Fever or chills  CARDIOVASCULAR:  No chest pain  RESPIRATORY: see history  GASTROINTESTINAL:  No nausea, vomiting. no abdominal pain  GENITOURINARY:  No dysuria, frequency or urgency.  MUSCULOSKELETAL:  no back pain  NEUROLOGIC:  No headache, no dizziness  PSYCHIATRIC:  No disorder of thought or mood.     Physical Exam:  ICU Vital Signs Last 24 Hrs  T(C): 36.4 (12 Apr 2022 11:52), Max: 37 (12 Apr 2022 04:28)  T(F): 97.6 (12 Apr 2022 11:52), Max: 98.6 (12 Apr 2022 04:28)  HR: 71 (12 Apr 2022 11:52) (71 - 86)  BP: 158/77 (12 Apr 2022 11:52) (150/70 - 172/69)  BP(mean): --  ABP: --  ABP(mean): --  RR: 18 (12 Apr 2022 11:52) (18 - 18)  SpO2: 88% (12 Apr 2022 11:52) (88% - 96%)     GEN: NAD  HEENT: normocephalic and atraumatic.     NECK: Supple.   LUNGS: Clear to auscultation.  HEART: Regular rate and rhythm  ABDOMEN: Soft, nontender, and nondistended.   EXTREMITIES: No knee swelling, no leg edema. RUE AV fistula  NEUROLOGIC: AOx3  PSYCHIATRIC: Appropriate affect .  SKIN: R 1st and 2nd toe ulcers, no surrounding swelling noted  left medial heel ulcer, no surrounding swelling    Labs:  04-12    133<L>  |  94<L>  |  73<H>  ----------------------------<  269<H>  4.4   |  25  |  7.60<H>    Ca    9.6      12 Apr 2022 08:07                            9.3    15.43 )-----------( 476      ( 12 Apr 2022 08:07 )             28.4             RECENT CULTURES:  04-05 @ 09:28 .Blood Blood-Venous     No Growth Final        03-30 @ 18:08 .Tissue Other, Left Medial Ankle Klebsiella oxytoca /Raoutella ornithinolytica  Escherichia coli  Staphylococcus aureus    Few Klebsiella oxytoca/Raoutella ornithinolytica  Few Escherichia coli  Moderate Staphylococcus aureus    Few polymorphonuclear leukocytes per low power field  Moderate Gram positive cocci in pairs per oil power field  Rare Gram Negative Rods per oil power field            All imaging and data are reviewed.    [facility]  INFECTIOUS DISEASES - [address], [place] [address]  Tel: [phone]     Fax: [phone]  =======================================================    SHILO KOHLER [id number]    Follow up: No fevers. On room air. She says breathing improved and back to baseline. Denies any pain aside from feet occasionally. She was ambulating with physical therapy. Had 1 bowel movement yesterday and 1 today.    Allergies:  latex (Unknown)  No Known Drug Allergies      Antibiotics:  acetaminophen     Tablet .. 650 milliGRAM(s) Oral every 6 hours PRN  aluminum hydroxide/magnesium hydroxide/simethicone Suspension 30 milliLiter(s) Oral every 4 hours PRN  apixaban 2.5 milliGRAM(s) Oral every 12 hours  aspirin enteric coated 81 milliGRAM(s) Oral daily  atorvastatin 40 milliGRAM(s) Oral at bedtime  calcium acetate 667 milliGRAM(s) Oral three times a day with meals  ceFAZolin   IVPB 1000 milliGRAM(s) IV Intermittent every 24 hours  cloNIDine 0.1 milliGRAM(s) Oral every 12 hours  clopidogrel Tablet 75 milliGRAM(s) Oral daily  dextrose 5%. 1000 milliLiter(s) IV Continuous <Continuous>  dextrose 5%. 1000 milliLiter(s) IV Continuous <Continuous>  dextrose 50% Injectable 25 Gram(s) IV Push once  dextrose 50% Injectable 12.5 Gram(s) IV Push once  dextrose 50% Injectable 25 Gram(s) IV Push once  dextrose Oral Gel 15 Gram(s) Oral once PRN  diltiazem    Tablet 60 milliGRAM(s) Oral every 8 hours  epoetin fawad-epbx (RETACRIT) Injectable 94351 Unit(s) IV Push <User Schedule>  glucagon  Injectable 1 milliGRAM(s) IntraMuscular once  imipramine 50 milliGRAM(s) Oral at bedtime  insulin glargine Injectable (LANTUS) 10 Unit(s) SubCutaneous every morning  insulin lispro (ADMELOG) corrective regimen sliding scale   SubCutaneous at bedtime  insulin lispro (ADMELOG) corrective regimen sliding scale   SubCutaneous three times a day before meals  lactobacillus acidophilus 1 Tablet(s) Oral two times a day  melatonin 3 milliGRAM(s) Oral at bedtime PRN  metoprolol tartrate 100 milliGRAM(s) Oral every 12 hours  Nephro-elvie 1 Tablet(s) Oral daily  ondansetron Injectable 4 milliGRAM(s) IV Push every 8 hours PRN  pantoprazole    Tablet 40 milliGRAM(s) Oral before breakfast  traMADol 50 milliGRAM(s) Oral three times a day PRN       REVIEW OF SYSTEMS:  CONSTITUTIONAL:  No Fever or chills  CARDIOVASCULAR:  No chest pain  RESPIRATORY: see history  GASTROINTESTINAL:  No nausea, vomiting. no abdominal pain  GENITOURINARY:  No dysuria, frequency or urgency.  MUSCULOSKELETAL:  no back pain  NEUROLOGIC:  No headache, no dizziness  PSYCHIATRIC:  No disorder of thought or mood.     Physical Exam:  ICU Vital Signs Last 24 Hrs  T(C): 36.4 (12 Apr 2022 11:52), Max: 37 (12 Apr 2022 04:28)  T(F): 97.6 (12 Apr 2022 11:52), Max: 98.6 (12 Apr 2022 04:28)  HR: 71 (12 Apr 2022 11:52) (71 - 86)  BP: 158/77 (12 Apr 2022 11:52) (150/70 - 172/69)  BP(mean): --  ABP: --  ABP(mean): --  RR: 18 (12 Apr 2022 11:52) (18 - 18)  SpO2: 88% (12 Apr 2022 11:52) (88% - 96%)     GEN: NAD  HEENT: normocephalic and atraumatic.     NECK: Supple.   LUNGS: Clear to auscultation.  HEART: Regular rate and rhythm  ABDOMEN: Soft, nontender, and nondistended.   EXTREMITIES: No knee swelling, no leg edema. RUE AV fistula  NEUROLOGIC: AOx3  PSYCHIATRIC: Appropriate affect .  SKIN: R 1st and 2nd toe ulcers, no surrounding swelling noted  left medial heel ulcer, no surrounding swelling    Labs:  04-12    133<L>  |  94<L>  |  73<H>  ----------------------------<  269<H>  4.4   |  25  |  7.60<H>    Ca    9.6      12 Apr 2022 08:07                            9.3    15.43 )-----------( 476      ( 12 Apr 2022 08:07 )             28.4             RECENT CULTURES:  04-05 @ 09:28 .Blood Blood-Venous     No Growth Final        03-30 @ 18:08 .Tissue Other, Left Medial Ankle Klebsiella oxytoca /Raoutella ornithinolytica  Escherichia coli  Staphylococcus aureus    Few Klebsiella oxytoca/Raoutella ornithinolytica  Few Escherichia coli  Moderate Staphylococcus aureus    Few polymorphonuclear leukocytes per low power field  Moderate Gram positive cocci in pairs per oil power field  Rare Gram Negative Rods per oil power field            All imaging and data are reviewed.

## 2022-04-12 NOTE — DISCHARGE NOTE NURSING/CASE MANAGEMENT/SOCIAL WORK - NSDCVIVACCINE_GEN_ALL_CORE_FT
Tdap; 27-Nov-2015 19:03; Bob Kahn (LEV); [employer]; [id number]; IntraMuscular; Deltoid Left.; 0.5 milliLiter(s); VIS (VIS Published: 09-May-2013, VIS Presented: 27-Nov-2015);

## 2022-04-12 NOTE — PROGRESS NOTE ADULT - ASSESSMENT
72yo female bib ems with sob, pt states she has missed the last 2 rounds of dialysis and is due today, and has been having worsening sob    oxygenation better - vs noted -   ID following - on ABX - on Ancef-   Vascular eval noted - f/u noted - w/u im progress -   ct chest report - poss pna - pulm edema - nodules - mucus plugging -   VS noted  Labs reviewed    pt wishes to have HD  renal - dr alvarez  labs and imaging reviewed  on supplemental o2 support  monitor labs - lytes  HD as per Renal team  GOC documented - Spouse Geoffrey - HCP  pt is full code - wants an attempt at Resuscitation - pt does not want to linger on machines

## 2022-04-12 NOTE — DISCHARGE NOTE PROVIDER - DETAILS OF MALNUTRITION DIAGNOSIS/DIAGNOSES
[address]  [facility]  A DEPARTMENT OF [address]  [address], [address]  [facility] [address]  [phone]        HISTORY:    Julián Thompson presents today for evaluation and management of:  Chief Complaint   Patient presents with    Diabetes     2 month       HPI    Interval History:    Current Diabetic Medications  novolog for use with omnipod.      DKA episodes: Multiple episodes of DKA related to not taking insulin and on diagnosis. 21   He was diagnosed in , had flu like symptoms and lost vision and went to the ER started on insulin. Was seen End[facility] in [facility] but does not like the drive has not been seen in over a year. He has had episodes of DKA which he relates to not taking insulin. He also admits to medication non compliance. He will often forget to replace the omnipod dash system. He was previously on medtronic insulin pump. He feels he does well on the pump is in place. He would like cgm therapy. Diet: counting carbs  Exercise: none  BS testin times daily denies hypoglycemia. Issues: denies      -Patient is on an insulin pump and testing blood sugars 4 more times daily and using these blood sugars for frequent insulin adjustments. 21   At previous visit CGM therapy was started. Patient states he was about 1 week without insulin or CGM due to ordering delay from pharmacy. Diet: Counting carbs  Exercise: None physically active during the day  BS testing: Uses CGM daily with success  Issues: Denies    High cholesterol-  Takes Zocor and denies any adverse effects with its use. Watches diet and exercise.      Hypertension-  Takes lisinopril and denies any adverse effects with their use. Watches diet and exercise. Denies any chest pain, dizziness or edema.       Obesity- Working on weight loss.        Past Medical History:   Diagnosis Date    Asthma     Diabetes mellitus ([address]     type 1    Diabetic ketoacidosis associated with type 1 diabetes mellitus ([facility] [address] 04/28/2017    Head injury     Hyperlipidemia     Hypertension     Male erectile dysfunction     Migraine     Neuropathy in diabetes ([facility] [address]      Family History   Problem Relation Age of Onset    Diabetes Mother         type 1    Heart Disease Mother     Kidney Disease Mother     High Cholesterol Mother     High Blood Pressure Mother     High Blood Pressure Father     Diabetes Father         type 2    Heart Disease Father      Social History     Tobacco Use    Smoking status: Current Some Day Smoker     Packs/day: 0.20     Types: Cigarettes    Smokeless tobacco: Never Used    Tobacco comment: ECLAMB RRT 7/19/17   Substance Use Topics    Alcohol use: Yes     Alcohol/week: 18.0 standard drinks     Types: 18 Cans of beer per week     Comment: has 18 beers in a weekend    Drug use: Never     Allergies   Allergen Reactions    Bee Venom Hives     With swelling    Vancomycin Other (See Comments)     From [facility] care plan - reaction unknown.  Lipitor [Atorvastatin] Hives       MEDICATIONS:  Current Outpatient Medications   Medication Sig Dispense Refill    Insulin Degludec 100 UNIT/ML SOPN Inject 20 Units into the skin daily For use when pump not in place 1 pen 0    amitriptyline (ELAVIL) 75 MG tablet take 1 tablet by mouth at bedtime 90 tablet 1    lisinopril (PRINIVIL;ZESTRIL) 5 MG tablet take 1 tablet by mouth once daily 90 tablet 1    Continuous Blood Gluc  (DEXCOM G6 ) JARED 1 Units by Does not apply route daily 1 Device 0    Continuous Blood Gluc Sensor (DEXCOM G6 SENSOR) MISC Use once sensor every 10 days 3 each 3    Continuous Blood Gluc Transmit (DEXCOM G6 TRANSMITTER) MISC Use one transmitter every 3 months 1 each 3    gabapentin (NEURONTIN) 400 MG capsule Take 1 capsule by mouth 3 times daily for 30 days. Take 1 capsule by mouth TID x 1 week, then increase to 1 cap qam, 1 cap qafternoon, and 2 caps at bedtime.  (Patient taking differently: Take 400 mg by mouth. 1 cap qam, 1 cap qafternoon, and 2 caps at bedtime.) 120 capsule 1    simvastatin (ZOCOR) 20 MG tablet Take 1 tablet by mouth every evening 90 tablet 1    EPINEPHrine (EPIPEN 2-KARY) 0.3 MG/0.3ML SOAJ injection Use as directed for allergic reaction 1 each 1    insulin aspart (NOVOLOG) 100 UNIT/ML injection vial Use 200 units with Omnipod pump every 3 days. 6 vial 1    Insulin Disposable Pump (OMNIPOD DASH 5 PACK PODS) MISC Inject 1 each as directed every 3 days 200 units of humalog in each pod last 3 days      Insulin Pen Needle (PEN NEEDLES) 32G X 5 MM MISC Use with insulin pen TID with meals. 90 each 5    Insulin Pump - Insulin regular Inject into the skin continuous Running with humalog in the pump - works with calorie count       No current facility-administered medications for this visit. Review ofSymptoms:  Review of Systems  Theremainder of a complete 14-point review of systems is negative. Vital Signs: /88   Pulse 96   Resp 16   Ht 5' 10\" (1.778 m)   Wt 186 lb (84.4 kg)   BMI 26.69 kg/m²      Wt Readings from Last 3 Encounters:   05/05/21 186 lb (84.4 kg)   03/31/21 157 lb (71.2 kg)   03/03/21 165 lb (74.8 kg)     Body mass index is 26.69 kg/m².   LABS:  Hemoglobin A1C   Date Value Ref Range Status   03/02/2021 12.5 (H) 4.0 - 6.0 % Final   09/06/2019 13.8 % Final     Lab Results   Component Value Date    LABMICR 29 (H) 08/08/2020     Lab Results   Component Value Date     01/20/2021    K 3.8 01/20/2021     01/20/2021    CO2 29 01/20/2021    BUN 16 01/20/2021    CREATININE 0.4 01/20/2021    GLUCOSE 162 (H) 08/08/2020    CALCIUM 9.1 01/20/2021    PROT 6.9 08/08/2020    LABALBU 3.8 01/20/2021    BILITOT 0.3 01/20/2021    ALKPHOS 92 01/20/2021    AST 15 01/20/2021    ALT 12 01/20/2021    LABGLOM >60 08/08/2020    GFRAA >60 08/08/2020     Lab Results   Component Value Date    CHOL 184 08/08/2020    CHOL 235 (H) 12/14/2018    CHOL 282 (H) type 1 diabetes mellitus (HCC)  - Unstable  HbA1C goal is less than 7%. - Fasting blood glucose goal is 70-130mg/dl and postprandial blood sugar goal is less than 180 mg/dl. -Diabetic foot exam up-to-date: Yes  -Diabetic retinal exam up-to-date: No  - Labs reviewed includes: Most recent A1C 12.5%, Microalb/Crt. Ratio 29 mcg/mg creat and GFR >60 mL/min. Repeat labs due in 3 months.    -We discussed in great detail dietary modifications they can make to better improve their blood sugars. -follow up diabetes education completed, all questions answered. CGM report downloaded and reviewed, scanned to media tab. Time in range 63% and hypoglycemia 0%. -Discussed with patient importance of not going without insulin we will order backup long-acting insulin in the event he is without his insulin pump.  -At this time no pump adjustments will be made we will continue to work on compliance. Patient Instructions   For back up insulin injections take 20 units of long acting and   7 units with each meal or use sliding scale for short acting insulin   Sliding Scale Insulin    Blood sugar Action     <70  Drink Juice      No extra insulin  150 - 200 2 units subcutaneous Insulin  201 - 250 4 units subcutaneous Insulin  251 - 300 6 units subcutaneous Insulin  301 - 350 8 units subcutaneous Insulin  351 - 400 10 units subcutaneous Insulin   > 400  12 units subcutaneous Insulin    Glucose tabs for low blood sugars. Discussed signs and symptoms of hyper/hypoglycemia and how to treat. Encouraged 150 minutes of physical activity per week. Follow a low carbohydrate diet. Encouraged at least 7 hours of sleep. The patient was informed of the goals of diabetes management. This can only be accomplished by watching their diet and exercise levels. We certainly use medicines to help attain these goals. The consequences of not controlling blood sugars were discussed.  These include blindness, heart disease, stroke, This patient has been assessed with a concern for Malnutrition and was treated during this hospitalization for the following Nutrition diagnosis/diagnoses:     -  04/06/2022: Severe protein-calorie malnutrition

## 2022-04-12 NOTE — PROGRESS NOTE ADULT - NUTRITIONAL ASSESSMENT
MEDICATIONS  (STANDING):  amLODIPine   Tablet 7.5 milliGRAM(s) Oral every 24 hours  aspirin enteric coated 81 milliGRAM(s) Oral daily  atorvastatin 40 milliGRAM(s) Oral at bedtime  calcium acetate 667 milliGRAM(s) Oral three times a day with meals  cloNIDine 0.1 milliGRAM(s) Oral every 12 hours  clopidogrel Tablet 75 milliGRAM(s) Oral daily  dextrose 5%. 1000 milliLiter(s) (100 mL/Hr) IV Continuous <Continuous>  dextrose 5%. 1000 milliLiter(s) (50 mL/Hr) IV Continuous <Continuous>  dextrose 50% Injectable 25 Gram(s) IV Push once  dextrose 50% Injectable 12.5 Gram(s) IV Push once  dextrose 50% Injectable 25 Gram(s) IV Push once  epoetin fawad-epbx (RETACRIT) Injectable 45658 Unit(s) IV Push <User Schedule>  glucagon  Injectable 1 milliGRAM(s) IntraMuscular once  heparin   Injectable 5000 Unit(s) SubCutaneous every 12 hours  imipramine 50 milliGRAM(s) Oral at bedtime  insulin glargine Injectable (LANTUS) 7 Unit(s) SubCutaneous every morning  insulin lispro (ADMELOG) corrective regimen sliding scale   SubCutaneous at bedtime  insulin lispro (ADMELOG) corrective regimen sliding scale   SubCutaneous three times a day before meals  lactobacillus acidophilus 1 Tablet(s) Oral two times a day  Nephro-elvie 1 Tablet(s) Oral daily  pantoprazole    Tablet 40 milliGRAM(s) Oral before breakfast  piperacillin/tazobactam IVPB.. 3.375 Gram(s) IV Intermittent every 12 hours

## 2022-04-12 NOTE — DISCHARGE NOTE NURSING/CASE MANAGEMENT/SOCIAL WORK - PATIENT PORTAL LINK FT
You can access the FollowMyHealth Patient Portal offered by [facility] by registering at the following website: [email or web address]. By joining [facility]’s FollowMyHealth portal, you will also be able to view your health information using other applications (apps) compatible with our system.

## 2022-04-12 NOTE — DISCHARGE NOTE PROVIDER - NSDCFUADDINST_GEN_ALL_CORE_FT
cefazolin 2g-2g-3g with HD x 4 weeks  -upon discharge, recommend weekly CBC with diff, CMP, ESR and CRP  -wound care

## 2022-04-12 NOTE — DISCHARGE NOTE NURSING/CASE MANAGEMENT/SOCIAL WORK - NSDCPEFALRISK_GEN_ALL_CORE
For information on Fall & Injury Prevention, visit: [email or web address] OR  [email or web address] OR  https://www.cdc.gov/steadi/patient.html

## 2022-04-12 NOTE — DISCHARGE NOTE PROVIDER - NSDCCPCAREPLAN_GEN_ALL_CORE_FT
PRINCIPAL DISCHARGE DIAGNOSIS  Diagnosis: Fluid overload  Assessment and Plan of Treatment: 2/2 to missed HD and poor complaince      SECONDARY DISCHARGE DIAGNOSES  Diagnosis: ESRD on dialysis  Assessment and Plan of Treatment:     Diagnosis: Hyperkalemia  Assessment and Plan of Treatment:     Diagnosis: Atrial fibrillation with tachycardic ventricular rate  Assessment and Plan of Treatment:     Diagnosis: Foot infection  Assessment and Plan of Treatment: with acute osteomyelitis ,poa    Diagnosis: DM (diabetes mellitus)  Assessment and Plan of Treatment:     Diagnosis: PAD (peripheral artery disease)  Assessment and Plan of Treatment:     Diagnosis: HTN (hypertension)  Assessment and Plan of Treatment:     Diagnosis: Poor compliance  Assessment and Plan of Treatment:     Diagnosis: CAD (coronary artery disease)  Assessment and Plan of Treatment:

## 2022-04-12 NOTE — DISCHARGE NOTE NURSING/CASE MANAGEMENT/SOCIAL WORK - NSDCFUADDAPPT_GEN_ALL_CORE_FT
Resume out patient dialysis tx on Tuesday 9:45AM (Tues/Thursday/Saturday), they will administer IV antibiotics at that times.

## 2022-04-12 NOTE — PROGRESS NOTE ADULT - ASSESSMENT
JOSE F LAO 71 f 4/5/2022 1948 DR HARRY ALBRIGHT     REVIEW OF SYMPTOMS      Able to give ROS  Yes     RELIABLE +/-   CONSTITUTIONAL Weakness Yes  Chills No   ENDOCRINE  No heat or cold intolerance    ALLERGY No hives  Sore throat No stridor  RESP Coughing blood no  Shortness of breath YES   NEURO No Headache  Confusion Pain neck No   CARDIAC No Chest pain No Palpitations   GI  Pain abdomen NO   Vomiting NO     PHYSICAL EXAM    HEENT Unremarkable  atraumatic   RESP Fair air entry EXP prolonged    Harsh breath sound Resp distres mild   CARDIAC S1 S2 No S3     NO JVD    ABDOMEN SOFT BS PRESENT NOT DISTENDED No hepatosplenomegaly PEDAL EDEMA present No calf tenderness  NO rash     DOA/CC/PROBLEMS poa .  4/5/2022   72yo female bib ems with sob, pt states she has missed the last 2 rounds of dialysis having worsening sob  ID following - on ABX - on Zosyn -   ct chest report - poss pna - pulm edema - nodules - mucus plugging -   LE ulcers are vascular in etiology, but MRI showed evidence of osteomyelitis of L medial malleolus and distal aspect of R 1st toe. No drainage or surrounding cellulitis noted from LE ulcers. Tissue cultures from 3/30/22 grew klebsiella oxytoca/raoutella oxytoca, E. coli, MSSA    COVID/ICU/CODE STATUS.                       COVID  STATUS.   4/11/2022 pcr (-)         ICU STAY. none  GOC.      BEST PRACTICE ISSUES.                                                  HEAD OF BED ELEVATION. Yes  DVT PROPHYLAXIS.  4/7/2022 apixaban 2.5x2 (a fib)     ELDER PROPHYLAXIS. 4/5 protonix 40                                                                                         DIET.   4/6 cons carb    VITALS/PO/IO/VENT/DRIPS.     4/12/2022 afeb 94 140/70      PROBLEM DATA/PLAN.    HEMODYNAMICS.   Monitor bp Target MAP 65 (+)    RESP.   Monitor po Target po 90-95%      PMH/PSH PROBLEMS.  Management continued/modified as indicated    OXYGEN REQUIREMENTS.  4/12/2022 ra 91%    SHORTNESS OF BREATH.  Likely sec fluid overload in setting ESRD   On hd    INFECTION A/R.  Pt on ancef started 4/11/2022 for osteomyelitus   3/30 tissue c Klebs E coli Staph a left medial malleolus   INFECTION MARCUM.  W 4/11-4/12/2022 w 16.2 - 15  blod c 4/5/2022 (-)   BACTERIO.  3/30 tissue c Klebs E coli Staph a   MOELCULAR.   4/8/2022 mrsa (-)   ANTIBIO.   4/11 ancef osteomyelitis)     CAD.  4/5/2022 asa 81   4/5/2022 plavix 75   4/5/2022 atorvasat 40     A fib.  4/7/2022 apixaban 2.5x2 (a fib)   4/7/2022 cardizem 60.3   4/6/2022 metoprolol 100.2     ANEMIA.   Hb 4/11-4/12/2022 Hb 10 - 9     ESRD.   On HD       TIME SPENT   Over 25 minutes aggregate care time spent on encounter; activities included   direct patient care, counseling and/or coordinating care reviewing notes, lab data/ imaging , discussion with multidisciplinary team/ patient  /family and explaining in detail risks, benefits, alternatives  of the recommendations     JOSE F LAO 71 f 4/5/2022 1948 DR HARRY ALBRIGHT

## 2022-04-12 NOTE — DISCHARGE NOTE PROVIDER - CARE PROVIDERS DIRECT ADDRESSES
[email or web address],DirectAddress_Unknown,DirectAddress_Unknown,DirectAddress_Unknown,DirectAddress_Unknown

## 2022-04-12 NOTE — PROGRESS NOTE ADULT - SUBJECTIVE AND OBJECTIVE BOX
Patient is a 71y Female whom presented to the hospital with esrd on hd     PAST MEDICAL & SURGICAL HISTORY:  Diabetes mellitus II    HTN (hypertension)    h/o Anxiety attack    Depression    h/o Myocardial infarct 2007    CAD (coronary artery disease)    h/o Hepatitis A 1969  currently resolved, no symptoms    PAD (peripheral artery disease)    Murmur, cardiac    h/o Smoking  quitted 3/2012    CRF (chronic renal failure), unspecified stage    Dialysis patient    Anemia secondary to renal failure    HTN (hypertension)    coronary stent 2007    s/p Ovarian cyst removal    s/p surgical removal of benign Skin lesion epigastric area        MEDICATIONS  (STANDING):  dextrose 5%. 1000 milliLiter(s) (100 mL/Hr) IV Continuous <Continuous>  dextrose 5%. 1000 milliLiter(s) (50 mL/Hr) IV Continuous <Continuous>  dextrose 50% Injectable 25 Gram(s) IV Push once  dextrose 50% Injectable 12.5 Gram(s) IV Push once  dextrose 50% Injectable 25 Gram(s) IV Push once  glucagon  Injectable 1 milliGRAM(s) IntraMuscular once  insulin lispro (ADMELOG) corrective regimen sliding scale   SubCutaneous three times a day before meals  piperacillin/tazobactam IVPB.. 3.375 Gram(s) IV Intermittent every 12 hours      Allergies    latex (Unknown)  No Known Drug Allergies    Intolerances        SOCIAL HISTORY:  Denies ETOh,Smoking,     FAMILY HISTORY:      REVIEW OF SYSTEMS:    CONSTITUTIONAL: No weakness, fevers or chills  RESPIRATORY: No cough, wheezing, hemoptysis; No shortness of breath  CARDIOVASCULAR: No chest pain or palpitations  GASTROINTESTINAL: No abdominal or epigastric pain. No nausea, vomiting,     No diarrhea or constipation. No melena   GENITOURINARY: No dysuria, frequency or hematuria                                                                                                                                                        9.3    15.43 )-----------( 476      ( 12 Apr 2022 08:07 )             28.4       CBC Full  -  ( 12 Apr 2022 08:07 )  WBC Count : 15.43 K/uL  RBC Count : 3.04 M/uL  Hemoglobin : 9.3 g/dL  Hematocrit : 28.4 %  Platelet Count - Automated : 476 K/uL  Mean Cell Volume : 93.4 fl  Mean Cell Hemoglobin : 30.6 pg  Mean Cell Hemoglobin Concentration : 32.7 gm/dL  Auto Neutrophil # : x  Auto Lymphocyte # : x  Auto Monocyte # : x  Auto Eosinophil # : x  Auto Basophil # : x  Auto Neutrophil % : x  Auto Lymphocyte % : x  Auto Monocyte % : x  Auto Eosinophil % : x  Auto Basophil % : x      04-12    133<L>  |  94<L>  |  73<H>  ----------------------------<  269<H>  4.4   |  25  |  7.60<H>    Ca    9.6      12 Apr 2022 08:07        CAPILLARY BLOOD GLUCOSE      POCT Blood Glucose.: 220 mg/dL (12 Apr 2022 21:12)  POCT Blood Glucose.: 141 mg/dL (12 Apr 2022 18:19)  POCT Blood Glucose.: 367 mg/dL (12 Apr 2022 11:44)  POCT Blood Glucose.: 297 mg/dL (12 Apr 2022 08:28)      Vital Signs Last 24 Hrs  T(C): 36.8 (12 Apr 2022 20:15), Max: 37.3 (12 Apr 2022 19:59)  T(F): 98.2 (12 Apr 2022 20:15), Max: 99.1 (12 Apr 2022 19:59)  HR: 75 (12 Apr 2022 20:15) (71 - 94)  BP: 146/81 (12 Apr 2022 20:15) (146/81 - 172/69)  BP(mean): --  RR: 17 (12 Apr 2022 20:15) (17 - 18)  SpO2: 93% (12 Apr 2022 20:15) (88% - 99%)              PHYSICAL EXAM:    Constitutional: NAD  HEENT: conjunctive   clear   Neck:  No JVD  Respiratory: decrease bs b/l   Cardiovascular: S1 and S2  Gastrointestinal: BS+, soft, NT/ND  Extremities: No peripheral edema

## 2022-04-12 NOTE — DISCHARGE NOTE PROVIDER - NSDCMRMEDTOKEN_GEN_ALL_CORE_FT
acetaminophen 325 mg oral tablet: 2 tab(s) orally every 6 hours, As needed, Temp greater or equal to 38C (100.4F), Mild Pain (1 - 3)  amLODIPine 2.5 mg oral tablet: 3 tab(s) orally every 24 hours  apixaban 2.5 mg oral tablet: 1 tab(s) orally every 12 hours  aspirin 81 mg oral delayed release tablet: 1 tab(s) orally once a day  atorvastatin 40 mg oral tablet: 1 tab(s) orally once a day (at bedtime)  home/hosp  calcium acetate 667 mg oral tablet: orally 3 times a day  ceFAZolin: 3 times a week ,Patient will require abx with HD sessions - cefazolin 2g-2g-3g with HD x 4 weeks  - weekly CBC with diff, CMP, ESR and CRP  cloNIDine 0.1 mg oral tablet: 1 tab(s) orally 2 times a day  clopidogrel 75 mg oral tablet: 1 tab(s) orally once a day  home/hosp  dilTIAZem 60 mg oral tablet: 1 tab(s) orally every 8 hours  Emla 2.5%-2.5% topical cream: Apply topically to affected area once on Dialysis days  epoetin fawad: 41049 unit(s) intravenous 3 times a week with HD SESSION as per nephrologist orders   Fiasp 100 units/mL injectable solution: 3-5 unit(s) injectable 3 times a day (with meals) as per sliding scale  imipramine 50 mg oral tablet: 1 tab(s) orally once a day  home/hosp  lactobacillus acidophilus oral tablet: 2 tab(s) orally once a day  melatonin 3 mg oral tablet: 1 tab(s) orally once a day (at bedtime), As needed, Insomnia  metoprolol tartrate 100 mg oral tablet: 1 tab(s) orally every 12 hours  Protonix 40 mg oral delayed release tablet: 1 tab(s) orally once a day  hosp  Jennifer-Alfie oral tablet: 1 tab(s) orally once a day  traMADol 50 mg oral tablet: 1 tab(s) orally 3 times a day, As needed, Moderate Pain (4 - 6)  Tresiba FlexTouch 100 units/mL subcutaneous solution: 15 unit(s) subcutaneous once a day (at bedtime)  zolpidem 10 mg oral tablet: 1 tab(s) orally once a day (at bedtime)   acetaminophen 325 mg oral tablet: 2 tab(s) orally every 6 hours, As needed, Temp greater or equal to 38C (100.4F), Mild Pain (1 - 3)  apixaban 2.5 mg oral tablet: 1 tab(s) orally every 12 hours  aspirin 81 mg oral delayed release tablet: 1 tab(s) orally once a day OTC   atorvastatin 40 mg oral tablet: 1 tab(s) orally once a day (at bedtime)  home/hosp  calcium acetate 667 mg oral tablet: 1 tab(s) orally 3 times a day  ceFAZolin: 3 times a week ,Patient will require abx with HD sessions - cefazolin 2g-2g-3g with HD x 4 weeks  - weekly CBC with diff, CMP, ESR and CRP  cloNIDine 0.1 mg oral tablet: 1 tab(s) orally 2 times a day  clopidogrel 75 mg oral tablet: 1 tab(s) orally once a day  home/hosp  dilTIAZem 60 mg oral tablet: 1 tab(s) orally every 8 hours ,HOLD FOR SBP EBLOW 100 OR HR BELOW 60  Emla 2.5%-2.5% topical cream: Apply topically to affected area once on Dialysis days  epoetin fawad: 31626 unit(s) intravenous 3 times a week with HD SESSION as per nephrologist orders   Fiasp 100 units/mL injectable solution: 3-5 unit(s) injectable 3 times a day (with meals) as per sliding scale RESUME PREADMISSION COVERAGE   imipramine 50 mg oral tablet: 1 tab(s) orally once a day  home/hosp  lactobacillus acidophilus oral tablet: 2 tab(s) orally once a day  melatonin 3 mg oral tablet: 1 tab(s) orally once a day (at bedtime), As needed, Insomnia OTC   metoprolol tartrate 100 mg oral tablet: 1 tab(s) orally every 12 hours ,HOLD FOR SBP BELOW 100 OR HR BELOW 60  Protonix 40 mg oral delayed release tablet: 1 tab(s) orally once a day  hosp  Jennifer-Alfie oral tablet: 1 tab(s) orally once a day  traMADol 50 mg oral tablet: 1 tab(s) orally 3 times a day, As needed, Moderate Pain (4 - 6) MDD:3  Tresiba FlexTouch 100 units/mL subcutaneous solution: 15 unit(s) subcutaneous once a day (at bedtime) RESUME PREADMISSION COVERAGE

## 2022-04-12 NOTE — DISCHARGE NOTE PROVIDER - CARE PROVIDER_API CALL
Pahlavan, Mohsen (MD)  Medicine  [address], [address]  [place] [address]  Phone: [phone]  Fax: [phone]  Follow Up Time: 1-3 days    Lupe Tsai)  Infectious Disease; Internal Medicine  [address] [place] [address]  Phone: [phone]  Fax: [phone]  Follow Up Time: 2 weeks    Joseluis James)  Internal Medicine  [address], [address]  [place] [address]  Phone: [phone]  Fax: [phone]  Follow Up Time: 1 week    Faisal Pastor (DPM)  Podiatric Medicine  [address] [place] [address]  Phone: [phone]  Fax: [phone]  Follow Up Time: 1 week    Justine Rose)  Vascular Surgery  [address] [place] [address]  Phone: [phone]  Fax: [phone]  Follow Up Time: 1 month   Pahlavan, Mohsen (MD)  Medicine  [address] [facility], [address]  [place] [address]  Phone: [phone]  Fax: [phone]  Follow Up Time: 1-3 days    Lupe Tsai)  Infectious Disease; Internal Medicine  [address] [place] [address]  Phone: [phone]  Fax: [phone]  Follow Up Time: 2 weeks    Joseluis James)  Internal Medicine  [address], [address]  [place] [address]  Phone: [phone]  Fax: [phone]  Follow Up Time: 1 week    Faisal Pastor (DPM)  Podiatric Medicine  [address] [place] [address]  Phone: [phone]  Fax: [phone]  Follow Up Time: 1-3 days    Justine Rose)  Vascular Surgery  [address] [place] [address]  Phone: [phone]  Fax: [phone]  Follow Up Time: 1 month

## 2022-04-12 NOTE — DISCHARGE NOTE PROVIDER - NSDCHHNEEDSERVICE_GEN_ALL_CORE
Medication teaching and assessment/Observation and assessment/Rehabilitation services/Teaching and training Medication teaching and assessment/Observation and assessment/Rehabilitation services/Teaching and training/Wound care and assessment

## 2022-04-12 NOTE — DISCHARGE NOTE PROVIDER - NSDCCAREPROVSEEN_GEN_ALL_CORE_FT
Jacob, Joseluis Long, Geovany Larson, Diamond Ahumada, Dinah Oneill, Manuelito Landeros, Art Arreola, Tracy

## 2022-04-12 NOTE — OCCUPATIONAL THERAPY INITIAL EVALUATION ADULT - PERTINENT HX OF CURRENT PROBLEM, REHAB EVAL
70 y/o female BIB EMS with SOB, pt states she has missed the last 2 rounds of dialysis and is due today, and has been having worsening sob, no cough, fever, chills, no chest pain no other complaints. Found to have hyperkalemia. Admitted to telemetry unit for monitoring.

## 2022-04-12 NOTE — PROGRESS NOTE ADULT - SUBJECTIVE AND OBJECTIVE BOX
Date/Time Patient Seen:  		  Referring MD:   Data Reviewed	       Patient is a 71y old  Female who presents with a chief complaint of shortness of breath (11 Apr 2022 18:53)      Subjective/HPI     PAST MEDICAL & SURGICAL HISTORY:  Diabetes mellitus II    HTN (hypertension)    h/o Anxiety attack    Depression    h/o Myocardial infarct 2007    CAD (coronary artery disease)    CAD (coronary artery disease)    h/o Hepatitis A 1969  currently resolved, no symptoms    PAD (peripheral artery disease)    Murmur, cardiac    h/o Smoking  quitted 3/2012    CRF (chronic renal failure), unspecified stage    Dialysis patient    Anemia secondary to renal failure    HTN (hypertension)    coronary stent 2007    s/p Ovarian cyst removal    s/p surgical removal of benign Skin lesion epigastric area          Medication list         MEDICATIONS  (STANDING):  amLODIPine   Tablet 7.5 milliGRAM(s) Oral every 24 hours  apixaban 2.5 milliGRAM(s) Oral every 12 hours  aspirin enteric coated 81 milliGRAM(s) Oral daily  atorvastatin 40 milliGRAM(s) Oral at bedtime  calcium acetate 667 milliGRAM(s) Oral three times a day with meals  ceFAZolin   IVPB 1000 milliGRAM(s) IV Intermittent every 24 hours  cloNIDine 0.1 milliGRAM(s) Oral every 12 hours  clopidogrel Tablet 75 milliGRAM(s) Oral daily  dextrose 5%. 1000 milliLiter(s) (100 mL/Hr) IV Continuous <Continuous>  dextrose 5%. 1000 milliLiter(s) (50 mL/Hr) IV Continuous <Continuous>  dextrose 50% Injectable 25 Gram(s) IV Push once  dextrose 50% Injectable 12.5 Gram(s) IV Push once  dextrose 50% Injectable 25 Gram(s) IV Push once  diltiazem    Tablet 60 milliGRAM(s) Oral every 8 hours  epoetin fawad-epbx (RETACRIT) Injectable 99605 Unit(s) IV Push <User Schedule>  glucagon  Injectable 1 milliGRAM(s) IntraMuscular once  imipramine 50 milliGRAM(s) Oral at bedtime  insulin glargine Injectable (LANTUS) 10 Unit(s) SubCutaneous every morning  insulin lispro (ADMELOG) corrective regimen sliding scale   SubCutaneous at bedtime  insulin lispro (ADMELOG) corrective regimen sliding scale   SubCutaneous three times a day before meals  lactobacillus acidophilus 1 Tablet(s) Oral two times a day  metoprolol tartrate 100 milliGRAM(s) Oral every 12 hours  Nephro-elvie 1 Tablet(s) Oral daily  pantoprazole    Tablet 40 milliGRAM(s) Oral before breakfast    MEDICATIONS  (PRN):  acetaminophen     Tablet .. 650 milliGRAM(s) Oral every 6 hours PRN Temp greater or equal to 38C (100.4F), Mild Pain (1 - 3)  aluminum hydroxide/magnesium hydroxide/simethicone Suspension 30 milliLiter(s) Oral every 4 hours PRN Dyspepsia  dextrose Oral Gel 15 Gram(s) Oral once PRN Blood Glucose LESS THAN 70 milliGRAM(s)/deciliter  melatonin 3 milliGRAM(s) Oral at bedtime PRN Insomnia  ondansetron Injectable 4 milliGRAM(s) IV Push every 8 hours PRN Nausea and/or Vomiting  traMADol 50 milliGRAM(s) Oral three times a day PRN Moderate Pain (4 - 6)         Vitals log        ICU Vital Signs Last 24 Hrs  T(C): 37 (12 Apr 2022 04:28), Max: 37 (12 Apr 2022 04:28)  T(F): 98.6 (12 Apr 2022 04:28), Max: 98.6 (12 Apr 2022 04:28)  HR: 86 (12 Apr 2022 04:28) (65 - 86)  BP: 164/65 (12 Apr 2022 04:28) (141/58 - 172/69)  BP(mean): --  ABP: --  ABP(mean): --  RR: 18 (12 Apr 2022 04:28) (18 - 18)  SpO2: 96% (12 Apr 2022 04:28) (94% - 96%)           Input and Output:  I&O's Detail      Lab Data                        10.0   16.23 )-----------( 443      ( 11 Apr 2022 07:36 )             30.4     04-11    134<L>  |  96  |  54<H>  ----------------------------<  262<H>  4.4   |  27  |  6.10<H>    Ca    9.1      11 Apr 2022 07:36              Review of Systems	      Objective     Physical Examination    heart s1s2  lung dec BS  abd soft  head nc      Pertinent Lab findings & Imaging      Dexter:  NO   Adequate UO     I&O's Detail           Discussed with:     Cultures:	        Radiology

## 2022-04-12 NOTE — PROGRESS NOTE ADULT - SUBJECTIVE AND OBJECTIVE BOX
SHILO KOHLER    [facility] T[facility] 326 D1    Allergies    latex (Unknown)  No Known Drug Allergies    Intolerances        PAST MEDICAL & SURGICAL HISTORY:  Diabetes mellitus II    HTN (hypertension)    h/o Anxiety attack    Depression    h/o Myocardial infarct 2007    CAD (coronary artery disease)    h/o Hepatitis A 1969  currently resolved, no symptoms    PAD (peripheral artery disease)    Murmur, cardiac    h/o Smoking  quitted 3/2012    CRF (chronic renal failure), unspecified stage    Dialysis patient    Anemia secondary to renal failure    HTN (hypertension)    coronary stent 2007    s/p Ovarian cyst removal    s/p surgical removal of benign Skin lesion epigastric area        FAMILY HISTORY:      Home Medications:  amLODIPine 5 mg oral tablet: 1.5 tab(s) orally once a day  home (05 Apr 2022 18:40)  atorvastatin 40 mg oral tablet: 1 tab(s) orally once a day (at bedtime)  home/hosp (05 Apr 2022 18:40)  cloNIDine 0.1 mg oral tablet: 1 tab(s) orally 2 times a day (05 Apr 2022 18:40)  clopidogrel 75 mg oral tablet: 1 tab(s) orally once a day  home/hosp (05 Apr 2022 18:40)  Emla 2.5%-2.5% topical cream: Apply topically to affected area once on Dialysis days (05 Apr 2022 18:40)  Fiasp 100 units/mL injectable solution: 3-5 unit(s) injectable 3 times a day (with meals) as per sliding scale (05 Apr 2022 18:40)  imipramine 50 mg oral tablet: 1 tab(s) orally once a day  home/hosp (05 Apr 2022 18:40)  metoprolol succinate 50 mg oral tablet, extended release: 1 tab(s) orally once a day (05 Apr 2022 18:40)  Protonix 40 mg oral delayed release tablet: 1 tab(s) orally once a day  hosp (05 Apr 2022 18:40)  Jennifer-Elvie oral tablet: 1 tab(s) orally once a day (05 Apr 2022 18:40)  Tresiba FlexTouch 100 units/mL subcutaneous solution: 15 unit(s) subcutaneous once a day (at bedtime) (05 Apr 2022 18:40)  zolpidem 10 mg oral tablet: 1 tab(s) orally once a day (at bedtime) (05 Apr 2022 18:40)      MEDICATIONS  (STANDING):  amLODIPine   Tablet 7.5 milliGRAM(s) Oral every 24 hours  apixaban 2.5 milliGRAM(s) Oral every 12 hours  aspirin enteric coated 81 milliGRAM(s) Oral daily  atorvastatin 40 milliGRAM(s) Oral at bedtime  calcium acetate 667 milliGRAM(s) Oral three times a day with meals  ceFAZolin   IVPB 1000 milliGRAM(s) IV Intermittent every 24 hours  cloNIDine 0.1 milliGRAM(s) Oral every 12 hours  clopidogrel Tablet 75 milliGRAM(s) Oral daily  dextrose 5%. 1000 milliLiter(s) (100 mL/Hr) IV Continuous <Continuous>  dextrose 5%. 1000 milliLiter(s) (50 mL/Hr) IV Continuous <Continuous>  dextrose 50% Injectable 25 Gram(s) IV Push once  dextrose 50% Injectable 12.5 Gram(s) IV Push once  dextrose 50% Injectable 25 Gram(s) IV Push once  diltiazem    Tablet 60 milliGRAM(s) Oral every 8 hours  epoetin fawad-epbx (RETACRIT) Injectable 56840 Unit(s) IV Push <User Schedule>  glucagon  Injectable 1 milliGRAM(s) IntraMuscular once  imipramine 50 milliGRAM(s) Oral at bedtime  insulin glargine Injectable (LANTUS) 10 Unit(s) SubCutaneous every morning  insulin lispro (ADMELOG) corrective regimen sliding scale   SubCutaneous at bedtime  insulin lispro (ADMELOG) corrective regimen sliding scale   SubCutaneous three times a day before meals  lactobacillus acidophilus 1 Tablet(s) Oral two times a day  metoprolol tartrate 100 milliGRAM(s) Oral every 12 hours  Nephro-elvie 1 Tablet(s) Oral daily  pantoprazole    Tablet 40 milliGRAM(s) Oral before breakfast    MEDICATIONS  (PRN):  acetaminophen     Tablet .. 650 milliGRAM(s) Oral every 6 hours PRN Temp greater or equal to 38C (100.4F), Mild Pain (1 - 3)  aluminum hydroxide/magnesium hydroxide/simethicone Suspension 30 milliLiter(s) Oral every 4 hours PRN Dyspepsia  dextrose Oral Gel 15 Gram(s) Oral once PRN Blood Glucose LESS THAN 70 milliGRAM(s)/deciliter  melatonin 3 milliGRAM(s) Oral at bedtime PRN Insomnia  ondansetron Injectable 4 milliGRAM(s) IV Push every 8 hours PRN Nausea and/or Vomiting  traMADol 50 milliGRAM(s) Oral three times a day PRN Moderate Pain (4 - 6)      Diet, Consistent Carbohydrate Renal w/Evening Snack:   Easy to Chew (EASYTOCHEW)  Supplement Feeding Modality:  Oral  Nepro Cans or Servings Per Day:  1       Frequency:  Daily (04-06-22 @ 12:14) [Active]          Vital Signs Last 24 Hrs  T(C): 37 (12 Apr 2022 04:28), Max: 37 (12 Apr 2022 04:28)  T(F): 98.6 (12 Apr 2022 04:28), Max: 98.6 (12 Apr 2022 04:28)  HR: 86 (12 Apr 2022 04:28) (65 - 86)  BP: 164/65 (12 Apr 2022 04:28) (141/58 - 172/69)  BP(mean): --  RR: 18 (12 Apr 2022 04:28) (18 - 18)  SpO2: 96% (12 Apr 2022 04:28) (94% - 96%)              LABS:                        10.0   16.23 )-----------( 443      ( 11 Apr 2022 07:36 )             30.4     04-11    134<L>  |  96  |  54<H>  ----------------------------<  262<H>  4.4   |  27  |  6.10<H>    Ca    9.1      11 Apr 2022 07:36                WBC:  WBC Count: 16.23 K/uL (04-11 @ 07:36)  WBC Count: 10.66 K/uL (04-09 @ 08:01)      MICROBIOLOGY:  RECENT CULTURES:  04-05 .Blood Blood-Venous XXXX XXXX   No Growth Final                    Sodium:  Sodium, Serum: 134 mmol/L (04-11 @ 07:36)  Sodium, Serum: 132 mmol/L (04-09 @ 08:01)      6.10 mg/dL 04-11 @ 07:36  4.50 mg/dL 04-09 @ 08:01      Hemoglobin:  Hemoglobin: 10.0 g/dL (04-11 @ 07:36)  Hemoglobin: 9.5 g/dL (04-09 @ 08:01)      Platelets: Platelet Count - Automated: 443 K/uL (04-11 @ 07:36)  Platelet Count - Automated: 403 K/uL (04-09 @ 08:01)              RADIOLOGY & ADDITIONAL STUDIES:      MICROBIOLOGY:  RECENT CULTURES:  04-05 .Blood Blood-Venous XXXX XXXX   No Growth Final

## 2022-04-12 NOTE — PROGRESS NOTE ADULT - SUBJECTIVE AND OBJECTIVE BOX
PROGRESS NOTE  Patient is a 71y old  Female who presents with a chief complaint of shortness of breath (12 Apr 2022 10:32)  Chart and available morning labs /imaging are reviewed electronically , urgent issues addressed . More information  is being added upon completion of rounds , when more information is collected and management discussed with consultants , medical staff and social service/case management on the floor     OVERNIGHT      HPI:  72yo female bib ems with sob, pt states she has missed the last 2 rounds of dialysis and is due today, and has been having worsening sob, no cough, fever, chills, no chest pain no other complaints .Found to have hyperkalemia Admitted  to telemetry unit for monitoring , send 3 sets of cardiac enzymes to rule out acute coronary event, obtain ECHO to evaluate LVEF, cardiology consult  ,continue current management, O2 supply, anticoagulation plan as per cardiology consult Nephrology consult stat requested ,management d/w Dr Perez and  Palliative care consult requested ,to discuss advance directives and complete MOLST  (05 Apr 2022 07:22)    PAST MEDICAL & SURGICAL HISTORY:  Diabetes mellitus II    HTN (hypertension)    h/o Anxiety attack    Depression    h/o Myocardial infarct 2007    CAD (coronary artery disease)    h/o Hepatitis A 1969  currently resolved, no symptoms    PAD (peripheral artery disease)    Murmur, cardiac    h/o Smoking  quitted 3/2012    CRF (chronic renal failure), unspecified stage    Dialysis patient    Anemia secondary to renal failure    HTN (hypertension)    coronary stent 2007    s/p Ovarian cyst removal    s/p surgical removal of benign Skin lesion epigastric area        MEDICATIONS  (STANDING):  apixaban 2.5 milliGRAM(s) Oral every 12 hours  aspirin enteric coated 81 milliGRAM(s) Oral daily  atorvastatin 40 milliGRAM(s) Oral at bedtime  calcium acetate 667 milliGRAM(s) Oral three times a day with meals  ceFAZolin   IVPB 1000 milliGRAM(s) IV Intermittent every 24 hours  cloNIDine 0.1 milliGRAM(s) Oral every 12 hours  clopidogrel Tablet 75 milliGRAM(s) Oral daily  dextrose 5%. 1000 milliLiter(s) (100 mL/Hr) IV Continuous <Continuous>  dextrose 5%. 1000 milliLiter(s) (50 mL/Hr) IV Continuous <Continuous>  dextrose 50% Injectable 25 Gram(s) IV Push once  dextrose 50% Injectable 12.5 Gram(s) IV Push once  dextrose 50% Injectable 25 Gram(s) IV Push once  diltiazem    Tablet 60 milliGRAM(s) Oral every 8 hours  epoetin fawad-epbx (RETACRIT) Injectable 02593 Unit(s) IV Push <User Schedule>  glucagon  Injectable 1 milliGRAM(s) IntraMuscular once  imipramine 50 milliGRAM(s) Oral at bedtime  insulin glargine Injectable (LANTUS) 10 Unit(s) SubCutaneous every morning  insulin lispro (ADMELOG) corrective regimen sliding scale   SubCutaneous at bedtime  insulin lispro (ADMELOG) corrective regimen sliding scale   SubCutaneous three times a day before meals  lactobacillus acidophilus 1 Tablet(s) Oral two times a day  metoprolol tartrate 100 milliGRAM(s) Oral every 12 hours  Nephro-elvie 1 Tablet(s) Oral daily  pantoprazole    Tablet 40 milliGRAM(s) Oral before breakfast    MEDICATIONS  (PRN):  acetaminophen     Tablet .. 650 milliGRAM(s) Oral every 6 hours PRN Temp greater or equal to 38C (100.4F), Mild Pain (1 - 3)  aluminum hydroxide/magnesium hydroxide/simethicone Suspension 30 milliLiter(s) Oral every 4 hours PRN Dyspepsia  dextrose Oral Gel 15 Gram(s) Oral once PRN Blood Glucose LESS THAN 70 milliGRAM(s)/deciliter  melatonin 3 milliGRAM(s) Oral at bedtime PRN Insomnia  ondansetron Injectable 4 milliGRAM(s) IV Push every 8 hours PRN Nausea and/or Vomiting  traMADol 50 milliGRAM(s) Oral three times a day PRN Moderate Pain (4 - 6)      OBJECTIVE    T(C): 37 (04-12-22 @ 04:28), Max: 37 (04-12-22 @ 04:28)  HR: 86 (04-12-22 @ 04:28) (65 - 86)  BP: 164/65 (04-12-22 @ 04:28) (150/70 - 172/69)  RR: 18 (04-12-22 @ 04:28) (18 - 18)  SpO2: 96% (04-12-22 @ 04:28) (96% - 96%)  Wt(kg): --  I&O's Summary        REVIEW OF SYSTEMS:  CONSTITUTIONAL: No fever, weight loss, or fatigue  EYES: No eye pain, visual disturbances, or discharge  ENMT:   No sinus or throat pain  NECK: No pain or stiffness  RESPIRATORY: No cough, wheezing, chills or hemoptysis; No shortness of breath  CARDIOVASCULAR: No chest pain, palpitations, dizziness, or leg swelling  GASTROINTESTINAL: No abdominal pain. No nausea, vomiting; No diarrhea or constipation. No melena or hematochezia.  GENITOURINARY: No dysuria, frequency, hematuria, or incontinence  NEUROLOGICAL: No headaches, memory loss, loss of strength, numbness, or tremors  SKIN: No itching, burning, rashes, or lesions   MUSCULOSKELETAL: No joint pain or swelling; No muscle, back, or extremity pain    PHYSICAL EXAM:  Appearance: NAD. VS past 24 hrs -as above   HEENT:   Moist oral mucosa. Conjunctiva clear b/l.   Neck : supple  Respiratory: Lungs CTAB.  Gastrointestinal:  Soft, nontender. No rebound. No rigidity. BS present	  Cardiovascular: RRR ,S1S2 present  Neurologic: Non-focal. Moving all extremities.  Extremities: No edema. No erythema. No calf tenderness.  Skin: No rashes, No ecchymoses, No cyanosis.	  wounds ,skin lesions-See skin assesment flow sheet   LABS:                        9.3    15.43 )-----------( 476      ( 12 Apr 2022 08:07 )             28.4     04-12    133<L>  |  94<L>  |  73<H>  ----------------------------<  269<H>  4.4   |  25  |  7.60<H>    Ca    9.6      12 Apr 2022 08:07      CAPILLARY BLOOD GLUCOSE      POCT Blood Glucose.: 297 mg/dL (12 Apr 2022 08:28)  POCT Blood Glucose.: 368 mg/dL (11 Apr 2022 20:56)  POCT Blood Glucose.: 430 mg/dL (11 Apr 2022 17:04)  POCT Blood Glucose.: 423 mg/dL (11 Apr 2022 17:02)  POCT Blood Glucose.: 395 mg/dL (11 Apr 2022 12:16)          Culture - Blood (collected 05 Apr 2022 09:28)  Source: .Blood Blood-Venous  Final Report (10 Apr 2022 10:00):    No Growth Final    Culture - Blood (collected 05 Apr 2022 09:28)  Source: .Blood Blood-Venous  Final Report (10 Apr 2022 10:00):    No Growth Final      RADIOLOGY & ADDITIONAL TESTS:   reviewed elctronically  ASSESSMENT/PLAN: 	     PROGRESS NOTE  Patient is a 71y old  Female who presents with a chief complaint of shortness of breath (12 Apr 2022 10:32)  Chart and available morning labs /imaging are reviewed electronically , urgent issues addressed . More information  is being added upon completion of rounds , when more information is collected and management discussed with consultants , medical staff and social service/case management on the floor     OVERNIGHT  No new issues reported by medical staff . All above noted Patient is resting in a bed comfortably . .No distress noted   D/C plan and outpatient abx x 4 w discussed Patient is scheduled for HD today ,she was advised to arrange ride home after HD session   HPI:  72yo female bib ems with sob, pt states she has missed the last 2 rounds of dialysis and is due today, and has been having worsening sob, no cough, fever, chills, no chest pain no other complaints .Found to have hyperkalemia Admitted  to telemetry unit for monitoring , send 3 sets of cardiac enzymes to rule out acute coronary event, obtain ECHO to evaluate LVEF, cardiology consult  ,continue current management, O2 supply, anticoagulation plan as per cardiology consult Nephrology consult stat requested ,management d/w Dr Perez and  Palliative care consult requested ,to discuss advance directives and complete MOLST  (05 Apr 2022 07:22)    PAST MEDICAL & SURGICAL HISTORY:  Diabetes mellitus II    HTN (hypertension)    h/o Anxiety attack    Depression    h/o Myocardial infarct 2007    CAD (coronary artery disease)    h/o Hepatitis A 1969  currently resolved, no symptoms    PAD (peripheral artery disease)    Murmur, cardiac    h/o Smoking  quitted 3/2012    CRF (chronic renal failure), unspecified stage    Dialysis patient    Anemia secondary to renal failure    HTN (hypertension)    coronary stent 2007    s/p Ovarian cyst removal    s/p surgical removal of benign Skin lesion epigastric area        MEDICATIONS  (STANDING):  apixaban 2.5 milliGRAM(s) Oral every 12 hours  aspirin enteric coated 81 milliGRAM(s) Oral daily  atorvastatin 40 milliGRAM(s) Oral at bedtime  calcium acetate 667 milliGRAM(s) Oral three times a day with meals  ceFAZolin   IVPB 1000 milliGRAM(s) IV Intermittent every 24 hours  cloNIDine 0.1 milliGRAM(s) Oral every 12 hours  clopidogrel Tablet 75 milliGRAM(s) Oral daily  dextrose 5%. 1000 milliLiter(s) (100 mL/Hr) IV Continuous <Continuous>  dextrose 5%. 1000 milliLiter(s) (50 mL/Hr) IV Continuous <Continuous>  dextrose 50% Injectable 25 Gram(s) IV Push once  dextrose 50% Injectable 12.5 Gram(s) IV Push once  dextrose 50% Injectable 25 Gram(s) IV Push once  diltiazem    Tablet 60 milliGRAM(s) Oral every 8 hours  epoetin fawad-epbx (RETACRIT) Injectable 95889 Unit(s) IV Push <User Schedule>  glucagon  Injectable 1 milliGRAM(s) IntraMuscular once  imipramine 50 milliGRAM(s) Oral at bedtime  insulin glargine Injectable (LANTUS) 10 Unit(s) SubCutaneous every morning  insulin lispro (ADMELOG) corrective regimen sliding scale   SubCutaneous at bedtime  insulin lispro (ADMELOG) corrective regimen sliding scale   SubCutaneous three times a day before meals  lactobacillus acidophilus 1 Tablet(s) Oral two times a day  metoprolol tartrate 100 milliGRAM(s) Oral every 12 hours  Nephro-elvie 1 Tablet(s) Oral daily  pantoprazole    Tablet 40 milliGRAM(s) Oral before breakfast    MEDICATIONS  (PRN):  acetaminophen     Tablet .. 650 milliGRAM(s) Oral every 6 hours PRN Temp greater or equal to 38C (100.4F), Mild Pain (1 - 3)  aluminum hydroxide/magnesium hydroxide/simethicone Suspension 30 milliLiter(s) Oral every 4 hours PRN Dyspepsia  dextrose Oral Gel 15 Gram(s) Oral once PRN Blood Glucose LESS THAN 70 milliGRAM(s)/deciliter  melatonin 3 milliGRAM(s) Oral at bedtime PRN Insomnia  ondansetron Injectable 4 milliGRAM(s) IV Push every 8 hours PRN Nausea and/or Vomiting  traMADol 50 milliGRAM(s) Oral three times a day PRN Moderate Pain (4 - 6)      OBJECTIVE    T(C): 37 (04-12-22 @ 04:28), Max: 37 (04-12-22 @ 04:28)  HR: 86 (04-12-22 @ 04:28) (65 - 86)  BP: 164/65 (04-12-22 @ 04:28) (150/70 - 172/69)  RR: 18 (04-12-22 @ 04:28) (18 - 18)  SpO2: 96% (04-12-22 @ 04:28) (96% - 96%)  Wt(kg): --  I&O's Summary        REVIEW OF SYSTEMS:  CONSTITUTIONAL: No fever, weight loss, or fatigue  EYES: No eye pain, visual disturbances, or discharge  ENMT:   No sinus or throat pain  NECK: No pain or stiffness  RESPIRATORY: No cough, wheezing, chills or hemoptysis; No shortness of breath  CARDIOVASCULAR: No chest pain, palpitations, dizziness, or leg swelling  GASTROINTESTINAL: No abdominal pain. No nausea, vomiting; No diarrhea or constipation. No melena or hematochezia.  GENITOURINARY: No dysuria, frequency, hematuria, or incontinence  NEUROLOGICAL: No headaches, memory loss, loss of strength, numbness, or tremors  SKIN: No itching, burning, rashes, or lesions   MUSCULOSKELETAL: No joint pain or swelling; No muscle, back, or extremity pain    PHYSICAL EXAM:  Appearance: NAD. VS past 24 hrs -as above   HEENT:   Moist oral mucosa. Conjunctiva clear b/l.   Neck : supple  Respiratory: Lungs CTAB.  Gastrointestinal:  Soft, nontender. No rebound. No rigidity. BS present	  Cardiovascular: RRR ,S1S2 present  Neurologic: Non-focal. Moving all extremities.  Extremities: No edema. No erythema. No calf tenderness.  Skin: No rashes, No ecchymoses, No cyanosis.	  wounds ,skin lesions-See skin assesment flow sheet   LABS:                        9.3    15.43 )-----------( 476      ( 12 Apr 2022 08:07 )             28.4     04-12    133<L>  |  94<L>  |  73<H>  ----------------------------<  269<H>  4.4   |  25  |  7.60<H>    Ca    9.6      12 Apr 2022 08:07      CAPILLARY BLOOD GLUCOSE      POCT Blood Glucose.: 297 mg/dL (12 Apr 2022 08:28)  POCT Blood Glucose.: 368 mg/dL (11 Apr 2022 20:56)  POCT Blood Glucose.: 430 mg/dL (11 Apr 2022 17:04)  POCT Blood Glucose.: 423 mg/dL (11 Apr 2022 17:02)  POCT Blood Glucose.: 395 mg/dL (11 Apr 2022 12:16)          Culture - Blood (collected 05 Apr 2022 09:28)  Source: .Blood Blood-Venous  Final Report (10 Apr 2022 10:00):    No Growth Final    Culture - Blood (collected 05 Apr 2022 09:28)  Source: .Blood Blood-Venous  Final Report (10 Apr 2022 10:00):    No Growth Final      RADIOLOGY & ADDITIONAL TESTS:   reviewed elctronically  ASSESSMENT/PLAN: 	    25 minutes aggregate time was spent on this visit, 50% visit time spent in care co-ordination with other attendings and counselling patient .I have discussed care plan with patient / HCP/family member ,who expressed understanding of problems treatment and their effect and side effects, alternatives in details. I have asked if they have any questions and concerns and appropriately addressed them to best of my ability.

## 2022-04-12 NOTE — OCCUPATIONAL THERAPY INITIAL EVALUATION ADULT - REHAB POTENTIAL, OT EVAL
no skilled OT needs; pt able to complete her ADLs with supervision/independently and can have assist from spouse at home as needed/none

## 2022-04-12 NOTE — DISCHARGE NOTE PROVIDER - PROVIDER TOKENS
PROVIDER:[TOKEN:[[id number]],FOLLOWUP:[1-3 days]],PROVIDER:[TOKEN:[[id number]],FOLLOWUP:[2 weeks]],PROVIDER:[TOKEN:[[id number]],FOLLOWUP:[1 week]],PROVIDER:[TOKEN:[[id number]],FOLLOWUP:[1 week]],PROVIDER:[TOKEN:[[id number]],FOLLOWUP:[1 month]] PROVIDER:[TOKEN:[[id number]],FOLLOWUP:[1-3 days]],PROVIDER:[TOKEN:[[id number]],FOLLOWUP:[2 weeks]],PROVIDER:[TOKEN:[[id number]],FOLLOWUP:[1 week]],PROVIDER:[TOKEN:[[id number]],FOLLOWUP:[1-3 days]],PROVIDER:[TOKEN:[[id number]],FOLLOWUP:[1 month]]

## 2022-04-12 NOTE — PROGRESS NOTE ADULT - ASSESSMENT
72yo female with hx of ESRD on HD, who presented with SOB after missing dialysis x 2. Found to have leukocytosis, likely multifactorial in the setting of fluid overload and possible pneumonia. She remains afebrile and improved from respiratory standpoint. Culture data unrevealing. She remains afebrile and WBC improved to 15 today, and patient feels better overall.    Suspect LE ulcers are vascular in etiology, but MRI showed evidence of osteomyelitis of L medial malleolus and distal aspect of R 1st toe. No drainage or surrounding cellulitis noted from LE ulcers. Plan to treat based on recent tissue cultures from 3/30/22 grew klebsiella oxytoca/raoutella oxytoca, E. coli, MSSA--susceptibilities reviewed.    -suggest cefazolin 2g-2g-3g with HD x 4 weeks  -upon discharge, recommend weekly CBC with diff, CMP, ESR and CRP  -can follow up with [facility] prior to finishing antibiotic course    Lupe Tsai MD  Division of Infectious Diseases   Cell [phone] between 8am and 6pm   After 6pm and weekends please call ID service at [phone].

## 2022-04-12 NOTE — PROGRESS NOTE ADULT - SUBJECTIVE AND OBJECTIVE BOX
Patient is a 71y Female with a known history of :  Fluid overload [E87.70]    ESRD on dialysis [N18.6]    Poor compliance [Z91.19]    Hyperkalemia [E87.5]    DM (diabetes mellitus) [E11.9]    HTN (hypertension) [I10]    CAD (coronary artery disease) [I25.10]    Prophylactic measure [Z29.9]    Foot infection [L08.9]    Atrial fibrillation with tachycardic ventricular rate [I48.91]    Diarrhea [R19.7]    PAD (peripheral artery disease) [I73.9]      HPI:  72yo female bib ems with sob, pt states she has missed the last 2 rounds of dialysis and is due today, and has been having worsening sob, no cough, fever, chills, no chest pain no other complaints .Found to have hyperkalemia Admitted  to telemetry unit for monitoring , send 3 sets of cardiac enzymes to rule out acute coronary event, obtain ECHO to evaluate LVEF, cardiology consult  ,continue current management, O2 supply, anticoagulation plan as per cardiology consult Nephrology consult stat requested ,management d/w Dr Perez and  Palliative care consult requested ,to discuss advance directives and complete MOLST  (05 Apr 2022 07:22)      REVIEW OF SYSTEMS:    CONSTITUTIONAL: No fever, weight loss, or fatigue  EYES: No eye pain, visual disturbances, or discharge  ENMT:  No difficulty hearing, tinnitus, vertigo; No sinus or throat pain  NECK: No pain or stiffness  BREASTS: No pain, masses, or nipple discharge  RESPIRATORY: No cough, wheezing, chills or hemoptysis; No shortness of breath  CARDIOVASCULAR: No chest pain, palpitations, dizziness, or leg swelling  GASTROINTESTINAL: No abdominal or epigastric pain. No nausea, vomiting, or hematemesis; No diarrhea or constipation. No melena or hematochezia.  GENITOURINARY: No dysuria, frequency, hematuria, or incontinence  NEUROLOGICAL: No headaches, memory loss, loss of strength, numbness, or tremors  SKIN: No itching, burning, rashes, or lesions   LYMPH NODES: No enlarged glands  ENDOCRINE: No heat or cold intolerance; No hair loss  MUSCULOSKELETAL: No joint pain or swelling; No muscle, back, or extremity pain  PSYCHIATRIC: No depression, anxiety, mood swings, or difficulty sleeping  HEME/LYMPH: No easy bruising, or bleeding gums  ALLERGY AND IMMUNOLOGIC: No hives or eczema    MEDICATIONS  (STANDING):  apixaban 2.5 milliGRAM(s) Oral every 12 hours  aspirin enteric coated 81 milliGRAM(s) Oral daily  atorvastatin 40 milliGRAM(s) Oral at bedtime  calcium acetate 667 milliGRAM(s) Oral three times a day with meals  ceFAZolin   IVPB 1000 milliGRAM(s) IV Intermittent every 24 hours  cloNIDine 0.1 milliGRAM(s) Oral every 12 hours  clopidogrel Tablet 75 milliGRAM(s) Oral daily  dextrose 5%. 1000 milliLiter(s) (100 mL/Hr) IV Continuous <Continuous>  dextrose 5%. 1000 milliLiter(s) (50 mL/Hr) IV Continuous <Continuous>  dextrose 50% Injectable 25 Gram(s) IV Push once  dextrose 50% Injectable 12.5 Gram(s) IV Push once  dextrose 50% Injectable 25 Gram(s) IV Push once  diltiazem    Tablet 60 milliGRAM(s) Oral every 8 hours  epoetin fawad-epbx (RETACRIT) Injectable 44681 Unit(s) IV Push <User Schedule>  glucagon  Injectable 1 milliGRAM(s) IntraMuscular once  imipramine 50 milliGRAM(s) Oral at bedtime  insulin glargine Injectable (LANTUS) 10 Unit(s) SubCutaneous every morning  insulin lispro (ADMELOG) corrective regimen sliding scale   SubCutaneous at bedtime  insulin lispro (ADMELOG) corrective regimen sliding scale   SubCutaneous three times a day before meals  lactobacillus acidophilus 1 Tablet(s) Oral two times a day  metoprolol tartrate 100 milliGRAM(s) Oral every 12 hours  Nephro-elvie 1 Tablet(s) Oral daily  pantoprazole    Tablet 40 milliGRAM(s) Oral before breakfast    MEDICATIONS  (PRN):  acetaminophen     Tablet .. 650 milliGRAM(s) Oral every 6 hours PRN Temp greater or equal to 38C (100.4F), Mild Pain (1 - 3)  aluminum hydroxide/magnesium hydroxide/simethicone Suspension 30 milliLiter(s) Oral every 4 hours PRN Dyspepsia  dextrose Oral Gel 15 Gram(s) Oral once PRN Blood Glucose LESS THAN 70 milliGRAM(s)/deciliter  melatonin 3 milliGRAM(s) Oral at bedtime PRN Insomnia  ondansetron Injectable 4 milliGRAM(s) IV Push every 8 hours PRN Nausea and/or Vomiting  traMADol 50 milliGRAM(s) Oral three times a day PRN Moderate Pain (4 - 6)      ALLERGIES: latex (Unknown)  No Known Drug Allergies      FAMILY HISTORY:      PHYSICAL EXAMINATION:  -----------------------------  T(C): 37 (04-12-22 @ 04:28), Max: 37 (04-12-22 @ 04:28)  HR: 86 (04-12-22 @ 04:28) (65 - 86)  BP: 164/65 (04-12-22 @ 04:28) (141/58 - 172/69)  RR: 18 (04-12-22 @ 04:28) (18 - 18)  SpO2: 96% (04-12-22 @ 04:28) (94% - 96%)  Wt(kg): --        VITALS  T(C): 37 (04-12-22 @ 04:28), Max: 37 (04-12-22 @ 04:28)  HR: 86 (04-12-22 @ 04:28) (65 - 86)  BP: 164/65 (04-12-22 @ 04:28) (141/58 - 172/69)  RR: 18 (04-12-22 @ 04:28) (18 - 18)  SpO2: 96% (04-12-22 @ 04:28) (94% - 96%)    Constitutional: well developed, normal appearance, well groomed, well nourished, no deformities and no acute distress.   Eyes: the conjunctiva exhibited no abnormalities and the eyelids demonstrated no xanthelasmas.   HEENT: normal oral mucosa, no oral pallor and no oral cyanosis.   Neck: normal jugular venous A waves present, normal jugular venous V waves present and no jugular venous wilson A waves.   Pulmonary: no respiratory distress, normal respiratory rhythm and effort, no accessory muscle use and lungs were clear to auscultation bilaterally.   Cardiovascular: heart rate and rhythm were normal, normal S1 and S2 and no murmur, gallop, rub, heave or thrill are present.   Abdomen: soft, non-tender, no hepato-splenomegaly and no abdominal mass palpated.   Musculoskeletal: the gait could not be assessed..   Extremities: no clubbing of the fingernails, no localized cyanosis, no petechial hemorrhages and no ischemic changes.   Skin: normal skin color and pigmentation, no rash, no venous stasis, no skin lesions, no skin ulcer and no xanthoma was observed.   Psychiatric: oriented to person, place, and time, the affect was normal, the mood was normal and not feeling anxious.     LABS:   --------  04-12    133<L>  |  94<L>  |  73<H>  ----------------------------<  269<H>  4.4   |  25  |  7.60<H>    Ca    9.6      12 Apr 2022 08:07                           9.3    15.43 )-----------( 476      ( 12 Apr 2022 08:07 )             28.4                 RADIOLOGY:  -----------------    ECG:     ECHO:

## 2022-04-12 NOTE — CHART NOTE - NSCHARTNOTEFT_GEN_A_CORE
Called by RN for tachycardia. Patient noted to have heart rates 120-140's on tele, appears to be in afib. Patient seen and examined at bedside. Patient is asymptomatic. Denies any cp, dyspnea, lightheadedness. On exam, lungs are clear to ascultation b/l. Cardio: tachycardic, irregular rhythm no murmurs. EKG was performed showing afib rvr at 117bpm. She is HD stable with SBP in 140's. Will give lopressor 5mg IVP x1. Discharge cancelled for now due to afib rvr.  at bedside,  updated on care plan.

## 2022-04-12 NOTE — DISCHARGE NOTE PROVIDER - HOSPITAL COURSE
72yo female bib ems with sob, pt states she has missed the last 2 rounds of dialysis and is due today, and has been having worsening sob, no cough, fever, chills, no chest pain no other complaints .Found to have hyperkalemia Admitted  to telemetry unit for monitoring , send 3 sets of cardiac enzymes to rule out acute coronary event, obtain ECHO to evaluate LVEF, cardiology consult  ,continue current management, O2 supply, anticoagulation plan as per cardiology consult Nephrology consult stat requested ,management d/w Dr Perez and  Palliative care consult requested ,to discuss advance directives and complete MOLST     Nutritional Assessment:  · Nutritional Assessment  This patient has been assessed with a concern for Malnutrition and has been determined to have a diagnosis/diagnoses of Severe protein-calorie malnutrition.    This patient is being managed with:   Diet Consistent Carbohydrate Renal w/Evening Snack-  Easy to Chew (EASYTOCHEW)  Supplement Feeding Modality:  Oral  Nepro Cans or Servings Per Day:  1       Frequency:  Daily  Entered: Apr 6 2022 12:14PM    Problem/Plan - 1:  ·  Problem: Foot infection.  ·  Plan: MRI showed evidence of acute osteomyelitis of L medial malleolus and distal aspect of R 1st toe ,poa .   -ID recommended  cefazolin 2g-2g-3g with HD x 4 weeks  -upon discharge, recommend weekly CBC with diff, CMP, ESR and CRP  -wound care  -can [facility] prior to finishing antibiotic course.    Problem/Plan - 2:  ·  Problem: ESRD on dialysis.  ·  Plan: HD as per nephrologist ,case d/w Dr Perez and HD technician ,serial bmp.    Problem/Plan - 3:  ·  Problem: Fluid overload.  ·  Plan: HD ordered by nephrologist   ,ins/outs.    Problem/Plan - 4:  ·  Problem: Atrial fibrillation with tachycardic ventricular rate.  ·  Plan: Admitted  to telemetry unit for monitoring , send 3 sets of cardiac enzymes to rule out acute coronary event, obtain ECHO to evaluate LVEF, cardiology consult  ,continue current management, O2 supply, anticoagulation plan as per cardiology consult.    Problem/Plan - 5:  ·  Problem: DM (diabetes mellitus).  ·  Plan: Accu-Cheks monitoring and insulin corrective regimen  sliding scale coverage with short acting insulin, add long-acting insulin as needed ,no concentrated sweets diet, serial labs ,HbA1C,education.    Problem/Plan - 6:  ·  Problem: Hyperkalemia.  ·  Plan: s/p treatment in ER , continue HD as per nephrologist orders ,monitor electrolytes.    Problem/Plan - 7:  ·  Problem: CAD (coronary artery disease).   ·  Plan: Admitted  to telemetry unit for monitoring , send 3 sets of cardiac enzymes to rule out acute coronary event, obtain ECHO to evaluate LVEF, cardiology consult  ,continue current management, O2 supply, anticoagulation plan as per cardiology consult.    Problem/Plan - 8:  ·  Problem: HTN (hypertension).   ·  Plan: Admitted  to telemetry unit for monitoring , send 3 sets of cardiac enzymes to rule out acute coronary event, obtain ECHO to evaluate LVEF, cardiology consult  ,continue current management, O2 supply, anticoagulation plan as per cardiology consult.    Problem/Plan - 9:  ·  Problem: PAD (peripheral artery disease).   ·  Plan: Concern for right lower iliac artery extremity inflow disease.  Incompletely visualized bypass graft of left lower extremity with   antegrade flow .Vascular surgery consult requested.    Problem/Plan - 10:  ·  Problem: Diarrhea.   ·  Plan; ID consult is following  , monitor bmp and electrolytes ,septic workup.    Problem/Plan - 11:  ·  Problem: Poor compliance.   ·  Plan: patient was explained importance of compliance ,social service input ( home situation and compliance issues ) May require placement at [facility].    Problem/Plan - 12:  ·  Problem: Prophylactic measure.   ·  Plan: Gastrointestinal stress ulcer prophylaxis and DVT prophylaxis administered.      Additional Information:  Additional Information: DISCHARGE HOME WITH HOME CARE AND OUTPATIENT PT . CONSULT ( SAFETY OF HOME SITUATION AND COMPLIANCE ) SW TO ASSIST WITH OUTPATIENT ABX ARRANGEMENT X 4 WEEKS DURING HD SESSIONS

## 2022-04-13 VITALS — HEART RATE: 82 BPM | OXYGEN SATURATION: 96 % | DIASTOLIC BLOOD PRESSURE: 62 MMHG | SYSTOLIC BLOOD PRESSURE: 148 MMHG

## 2022-04-13 LAB
CULTURE RESULTS: SIGNIFICANT CHANGE UP
ORGANISM # SPEC MICROSCOPIC CNT: SIGNIFICANT CHANGE UP
SPECIMEN SOURCE: SIGNIFICANT CHANGE UP

## 2022-04-13 PROCEDURE — 83036 HEMOGLOBIN GLYCOSYLATED A1C: CPT

## 2022-04-13 PROCEDURE — 85730 THROMBOPLASTIN TIME PARTIAL: CPT

## 2022-04-13 PROCEDURE — 73718 MRI LOWER EXTREMITY W/O DYE: CPT

## 2022-04-13 PROCEDURE — 97530 THERAPEUTIC ACTIVITIES: CPT

## 2022-04-13 PROCEDURE — 87640 STAPH A DNA AMP PROBE: CPT

## 2022-04-13 PROCEDURE — 80053 COMPREHEN METABOLIC PANEL: CPT

## 2022-04-13 PROCEDURE — 75635 CT ANGIO ABDOMINAL ARTERIES: CPT

## 2022-04-13 PROCEDURE — 73721 MRI JNT OF LWR EXTRE W/O DYE: CPT

## 2022-04-13 PROCEDURE — 99261: CPT

## 2022-04-13 PROCEDURE — 87040 BLOOD CULTURE FOR BACTERIA: CPT

## 2022-04-13 PROCEDURE — 87635 SARS-COV-2 COVID-19 AMP PRB: CPT

## 2022-04-13 PROCEDURE — 80048 BASIC METABOLIC PNL TOTAL CA: CPT

## 2022-04-13 PROCEDURE — 96375 TX/PRO/DX INJ NEW DRUG ADDON: CPT

## 2022-04-13 PROCEDURE — 85610 PROTHROMBIN TIME: CPT

## 2022-04-13 PROCEDURE — 97165 OT EVAL LOW COMPLEX 30 MIN: CPT

## 2022-04-13 PROCEDURE — 85027 COMPLETE CBC AUTOMATED: CPT

## 2022-04-13 PROCEDURE — 85025 COMPLETE CBC W/AUTO DIFF WBC: CPT

## 2022-04-13 PROCEDURE — 83880 ASSAY OF NATRIURETIC PEPTIDE: CPT

## 2022-04-13 PROCEDURE — 93970 EXTREMITY STUDY: CPT

## 2022-04-13 PROCEDURE — 82962 GLUCOSE BLOOD TEST: CPT

## 2022-04-13 PROCEDURE — 84484 ASSAY OF TROPONIN QUANT: CPT

## 2022-04-13 PROCEDURE — 99285 EMERGENCY DEPT VISIT HI MDM: CPT

## 2022-04-13 PROCEDURE — 83605 ASSAY OF LACTIC ACID: CPT

## 2022-04-13 PROCEDURE — 97162 PT EVAL MOD COMPLEX 30 MIN: CPT

## 2022-04-13 PROCEDURE — 87641 MR-STAPH DNA AMP PROBE: CPT

## 2022-04-13 PROCEDURE — 71045 X-RAY EXAM CHEST 1 VIEW: CPT

## 2022-04-13 PROCEDURE — 71250 CT THORAX DX C-: CPT

## 2022-04-13 PROCEDURE — 84100 ASSAY OF PHOSPHORUS: CPT

## 2022-04-13 PROCEDURE — 36415 COLL VENOUS BLD VENIPUNCTURE: CPT

## 2022-04-13 PROCEDURE — 93005 ELECTROCARDIOGRAM TRACING: CPT

## 2022-04-13 PROCEDURE — 96374 THER/PROPH/DIAG INJ IV PUSH: CPT

## 2022-04-13 PROCEDURE — 97116 GAIT TRAINING THERAPY: CPT

## 2022-04-13 PROCEDURE — 93306 TTE W/DOPPLER COMPLETE: CPT

## 2022-04-13 PROCEDURE — 93925 LOWER EXTREMITY STUDY: CPT

## 2022-04-13 PROCEDURE — 84132 ASSAY OF SERUM POTASSIUM: CPT

## 2022-04-13 PROCEDURE — 83735 ASSAY OF MAGNESIUM: CPT

## 2022-04-13 PROCEDURE — 80202 ASSAY OF VANCOMYCIN: CPT

## 2022-04-13 RX ORDER — INSULIN GLARGINE 100 [IU]/ML
15 INJECTION, SOLUTION SUBCUTANEOUS EVERY MORNING
Refills: 0 | Status: DISCONTINUED | OUTPATIENT
Start: 2022-04-13 | End: 2022-04-13

## 2022-04-13 RX ADMIN — Medication 1: at 08:50

## 2022-04-13 RX ADMIN — CLOPIDOGREL BISULFATE 75 MILLIGRAM(S): 75 TABLET, FILM COATED ORAL at 12:19

## 2022-04-13 RX ADMIN — Medication 0.1 MILLIGRAM(S): at 08:51

## 2022-04-13 RX ADMIN — INSULIN GLARGINE 15 UNIT(S): 100 INJECTION, SOLUTION SUBCUTANEOUS at 08:50

## 2022-04-13 RX ADMIN — Medication 5: at 12:20

## 2022-04-13 RX ADMIN — Medication 60 MILLIGRAM(S): at 05:50

## 2022-04-13 RX ADMIN — Medication 667 MILLIGRAM(S): at 08:51

## 2022-04-13 RX ADMIN — Medication 1 TABLET(S): at 12:20

## 2022-04-13 RX ADMIN — Medication 60 MILLIGRAM(S): at 12:19

## 2022-04-13 RX ADMIN — Medication 81 MILLIGRAM(S): at 12:20

## 2022-04-13 RX ADMIN — Medication 667 MILLIGRAM(S): at 12:20

## 2022-04-13 RX ADMIN — Medication 100 MILLIGRAM(S): at 05:50

## 2022-04-13 RX ADMIN — Medication 1 TABLET(S): at 05:50

## 2022-04-13 RX ADMIN — PANTOPRAZOLE SODIUM 40 MILLIGRAM(S): 20 TABLET, DELAYED RELEASE ORAL at 05:50

## 2022-04-13 RX ADMIN — APIXABAN 2.5 MILLIGRAM(S): 2.5 TABLET, FILM COATED ORAL at 08:51

## 2022-04-13 NOTE — PROGRESS NOTE ADULT - SUBJECTIVE AND OBJECTIVE BOX
Patient is a 71y Female whom presented to the hospital with esrd on hd     PAST MEDICAL & SURGICAL HISTORY:  Diabetes mellitus II    HTN (hypertension)    h/o Anxiety attack    Depression    h/o Myocardial infarct 2007    CAD (coronary artery disease)    h/o Hepatitis A 1969  currently resolved, no symptoms    PAD (peripheral artery disease)    Murmur, cardiac    h/o Smoking  quitted 3/2012    CRF (chronic renal failure), unspecified stage    Dialysis patient    Anemia secondary to renal failure    HTN (hypertension)    coronary stent 2007    s/p Ovarian cyst removal    s/p surgical removal of benign Skin lesion epigastric area        MEDICATIONS  (STANDING):  dextrose 5%. 1000 milliLiter(s) (100 mL/Hr) IV Continuous <Continuous>  dextrose 5%. 1000 milliLiter(s) (50 mL/Hr) IV Continuous <Continuous>  dextrose 50% Injectable 25 Gram(s) IV Push once  dextrose 50% Injectable 12.5 Gram(s) IV Push once  dextrose 50% Injectable 25 Gram(s) IV Push once  glucagon  Injectable 1 milliGRAM(s) IntraMuscular once  insulin lispro (ADMELOG) corrective regimen sliding scale   SubCutaneous three times a day before meals  piperacillin/tazobactam IVPB.. 3.375 Gram(s) IV Intermittent every 12 hours      Allergies    latex (Unknown)  No Known Drug Allergies    Intolerances        SOCIAL HISTORY:  Denies ETOh,Smoking,     FAMILY HISTORY:      REVIEW OF SYSTEMS:    CONSTITUTIONAL: No weakness, fevers or chills  RESPIRATORY: No cough, wheezing, hemoptysis; No shortness of breath  CARDIOVASCULAR: No chest pain or palpitations  GASTROINTESTINAL: No abdominal or epigastric pain. No nausea, vomiting,     No diarrhea or constipation. No melena   GENITOURINARY: No dysuria, frequency or hematuria                                                                                                  9.3    15.43 )-----------( 476      ( 12 Apr 2022 08:07 )             28.4       CBC Full  -  ( 12 Apr 2022 08:07 )  WBC Count : 15.43 K/uL  RBC Count : 3.04 M/uL  Hemoglobin : 9.3 g/dL  Hematocrit : 28.4 %  Platelet Count - Automated : 476 K/uL  Mean Cell Volume : 93.4 fl  Mean Cell Hemoglobin : 30.6 pg  Mean Cell Hemoglobin Concentration : 32.7 gm/dL  Auto Neutrophil # : x  Auto Lymphocyte # : x  Auto Monocyte # : x  Auto Eosinophil # : x  Auto Basophil # : x  Auto Neutrophil % : x  Auto Lymphocyte % : x  Auto Monocyte % : x  Auto Eosinophil % : x  Auto Basophil % : x      04-12    133<L>  |  94<L>  |  73<H>  ----------------------------<  269<H>  4.4   |  25  |  7.60<H>    Ca    9.6      12 Apr 2022 08:07        CAPILLARY BLOOD GLUCOSE      POCT Blood Glucose.: 369 mg/dL (13 Apr 2022 12:10)  POCT Blood Glucose.: 186 mg/dL (13 Apr 2022 08:39)  POCT Blood Glucose.: 220 mg/dL (12 Apr 2022 21:12)      Vital Signs Last 24 Hrs  T(C): 36.8 (13 Apr 2022 04:39), Max: 37.3 (12 Apr 2022 19:59)  T(F): 98.3 (13 Apr 2022 04:39), Max: 99.1 (12 Apr 2022 19:59)  HR: 82 (13 Apr 2022 12:18) (57 - 94)  BP: 148/62 (13 Apr 2022 12:18) (112/58 - 148/62)  BP(mean): --  RR: 17 (13 Apr 2022 04:39) (17 - 18)  SpO2: 96% (13 Apr 2022 12:18) (91% - 96%)                    PHYSICAL EXAM:    Constitutional: NAD  HEENT: conjunctive   clear   Neck:  No JVD  Respiratory: decrease bs b/l   Cardiovascular: S1 and S2  Gastrointestinal: BS+, soft, NT/ND  Extremities: No peripheral edema

## 2022-04-13 NOTE — CONSULT NOTE ADULT - CONSULT REQUESTED DATE/TIME
05-Apr-2022
13-Apr-2022 06:53
05-Apr-2022 07:40
05-Apr-2022 12:34
05-Apr-2022 10:06
07-Apr-2022
05-Apr-2022 07:38

## 2022-04-13 NOTE — PROGRESS NOTE ADULT - PROBLEM SELECTOR PLAN 11
patient was explained importance of compliance ,social service input ( home situation and compliance issues ) May require placement at [facility] /[facility]
patient was explained importance of compliance ,social service input ( home situation and compliance issues ) May require placement at [facility]
patient was explained importance of compliance ,social service input ( home situation and compliance issues ) May require placement at [facility] /[facility]
patient was explained importance of compliance ,social service input ( home situation and compliance issues ) May require placement at [facility] /[facility]
patient was explained importance of compliance ,social service input ( home situation and compliance issues ) May require placement at [facility] /[facility]
patient was explained importance of compliance ,social service input ( home situation and compliance issues ) May require placement at [facility] /[facility]
patient was explained importance of compliance ,social service input ( home situation and compliance issues ) May require placement at [facility] /[facility]
patient was explained importance of compliance ,social service input ( home situation and compliance issues ) May require placement at [facility] /[facility]

## 2022-04-13 NOTE — PROGRESS NOTE ADULT - PROBLEM SELECTOR PROBLEM 9
PAD (peripheral artery disease)

## 2022-04-13 NOTE — PROGRESS NOTE ADULT - PROBLEM SELECTOR PROBLEM 11
Poor compliance

## 2022-04-13 NOTE — PROGRESS NOTE ADULT - SUBJECTIVE AND OBJECTIVE BOX
PROGRESS NOTE  Patient is a 71y old  Female who presents with a chief complaint of shortness of breath (13 Apr 2022 10:03)  Chart and available morning labs /imaging are reviewed electronically , urgent issues addressed . More information  is being added upon completion of rounds , when more information is collected and management discussed with consultants , medical staff and social service/case management on the floor     OVERNIGHT      HPI:  72yo female bib ems with sob, pt states she has missed the last 2 rounds of dialysis and is due today, and has been having worsening sob, no cough, fever, chills, no chest pain no other complaints .Found to have hyperkalemia Admitted  to telemetry unit for monitoring , send 3 sets of cardiac enzymes to rule out acute coronary event, obtain ECHO to evaluate LVEF, cardiology consult  ,continue current management, O2 supply, anticoagulation plan as per cardiology consult Nephrology consult stat requested ,management d/w Dr Perez and  Palliative care consult requested ,to discuss advance directives and complete MOLST  (05 Apr 2022 07:22)    PAST MEDICAL & SURGICAL HISTORY:  Diabetes mellitus II    HTN (hypertension)    h/o Anxiety attack    Depression    h/o Myocardial infarct 2007    CAD (coronary artery disease)    h/o Hepatitis A 1969  currently resolved, no symptoms    PAD (peripheral artery disease)    Murmur, cardiac    h/o Smoking  quitted 3/2012    CRF (chronic renal failure), unspecified stage    Dialysis patient    Anemia secondary to renal failure    HTN (hypertension)    coronary stent 2007    s/p Ovarian cyst removal    s/p surgical removal of benign Skin lesion epigastric area        MEDICATIONS  (STANDING):  apixaban 2.5 milliGRAM(s) Oral every 12 hours  aspirin enteric coated 81 milliGRAM(s) Oral daily  atorvastatin 40 milliGRAM(s) Oral at bedtime  calcium acetate 667 milliGRAM(s) Oral three times a day with meals  ceFAZolin   IVPB 1000 milliGRAM(s) IV Intermittent every 24 hours  cloNIDine 0.1 milliGRAM(s) Oral every 12 hours  clopidogrel Tablet 75 milliGRAM(s) Oral daily  dextrose 5%. 1000 milliLiter(s) (100 mL/Hr) IV Continuous <Continuous>  dextrose 5%. 1000 milliLiter(s) (50 mL/Hr) IV Continuous <Continuous>  dextrose 50% Injectable 25 Gram(s) IV Push once  dextrose 50% Injectable 12.5 Gram(s) IV Push once  dextrose 50% Injectable 25 Gram(s) IV Push once  diltiazem    Tablet 60 milliGRAM(s) Oral every 8 hours  epoetin fawad-epbx (RETACRIT) Injectable 74432 Unit(s) IV Push <User Schedule>  glucagon  Injectable 1 milliGRAM(s) IntraMuscular once  imipramine 50 milliGRAM(s) Oral at bedtime  insulin glargine Injectable (LANTUS) 15 Unit(s) SubCutaneous every morning  insulin lispro (ADMELOG) corrective regimen sliding scale   SubCutaneous at bedtime  insulin lispro (ADMELOG) corrective regimen sliding scale   SubCutaneous three times a day before meals  lactobacillus acidophilus 1 Tablet(s) Oral two times a day  metoprolol tartrate 100 milliGRAM(s) Oral every 12 hours  Nephro-elvie 1 Tablet(s) Oral daily  pantoprazole    Tablet 40 milliGRAM(s) Oral before breakfast    MEDICATIONS  (PRN):  acetaminophen     Tablet .. 650 milliGRAM(s) Oral every 6 hours PRN Temp greater or equal to 38C (100.4F), Mild Pain (1 - 3)  aluminum hydroxide/magnesium hydroxide/simethicone Suspension 30 milliLiter(s) Oral every 4 hours PRN Dyspepsia  dextrose Oral Gel 15 Gram(s) Oral once PRN Blood Glucose LESS THAN 70 milliGRAM(s)/deciliter  melatonin 3 milliGRAM(s) Oral at bedtime PRN Insomnia  ondansetron Injectable 4 milliGRAM(s) IV Push every 8 hours PRN Nausea and/or Vomiting      OBJECTIVE    T(C): 36.8 (04-13-22 @ 04:39), Max: 37.3 (04-12-22 @ 19:59)  HR: 82 (04-13-22 @ 12:18) (57 - 94)  BP: 148/62 (04-13-22 @ 12:18) (112/58 - 167/71)  RR: 17 (04-13-22 @ 04:39) (17 - 18)  SpO2: 96% (04-13-22 @ 12:18) (91% - 99%)  Wt(kg): --  I&O's Summary    12 Apr 2022 07:01  -  13 Apr 2022 07:00  --------------------------------------------------------  IN: 0 mL / OUT: 1300 mL / NET: -1300 mL          REVIEW OF SYSTEMS:  CONSTITUTIONAL: No fever, weight loss, or fatigue  EYES: No eye pain, visual disturbances, or discharge  ENMT:   No sinus or throat pain  NECK: No pain or stiffness  RESPIRATORY: No cough, wheezing, chills or hemoptysis; No shortness of breath  CARDIOVASCULAR: No chest pain, palpitations, dizziness, or leg swelling  GASTROINTESTINAL: No abdominal pain. No nausea, vomiting; No diarrhea or constipation. No melena or hematochezia.  GENITOURINARY: No dysuria, frequency, hematuria, or incontinence  NEUROLOGICAL: No headaches, memory loss, loss of strength, numbness, or tremors  SKIN: No itching, burning, rashes, or lesions   MUSCULOSKELETAL: No joint pain or swelling; No muscle, back, or extremity pain    PHYSICAL EXAM:  Appearance: NAD. VS past 24 hrs -as above   HEENT:   Moist oral mucosa. Conjunctiva clear b/l.   Neck : supple  Respiratory: Lungs CTAB.  Gastrointestinal:  Soft, nontender. No rebound. No rigidity. BS present	  Cardiovascular: RRR ,S1S2 present  Neurologic: Non-focal. Moving all extremities.  Extremities: No edema. No erythema. No calf tenderness.  Skin: No rashes, No ecchymoses, No cyanosis.	  wounds ,skin lesions-See skin assesment flow sheet   LABS:                        9.3    15.43 )-----------( 476      ( 12 Apr 2022 08:07 )             28.4     04-12    133<L>  |  94<L>  |  73<H>  ----------------------------<  269<H>  4.4   |  25  |  7.60<H>    Ca    9.6      12 Apr 2022 08:07      CAPILLARY BLOOD GLUCOSE      POCT Blood Glucose.: 369 mg/dL (13 Apr 2022 12:10)  POCT Blood Glucose.: 186 mg/dL (13 Apr 2022 08:39)  POCT Blood Glucose.: 220 mg/dL (12 Apr 2022 21:12)  POCT Blood Glucose.: 141 mg/dL (12 Apr 2022 18:19)          Culture - Blood (collected 05 Apr 2022 09:28)  Source: .Blood Blood-Venous  Final Report (10 Apr 2022 10:00):    No Growth Final    Culture - Blood (collected 05 Apr 2022 09:28)  Source: .Blood Blood-Venous  Final Report (10 Apr 2022 10:00):    No Growth Final      RADIOLOGY & ADDITIONAL TESTS:   reviewed elctronically  ASSESSMENT/PLAN: 	    Patient was seen and examined on a day of discharge . Plan of care , discharge medications and recommendations discussed with consultants and clearance for discharge obtained .Social service , case management  and medical staff are aware of plan. Family is notified. Discharge summary  is  prepared electronically-see separate document prepared by me .75minutes spent on this visit, 50% visit time spent in care co-ordination with other attendings and counselling patient  I have discussed care plan with patient and HCP,expressed understanding of problems treatment and their effect and side effects, alternatives in detail,I have asked if they have any questions and concerns and appropriately addressed them to best of my ability

## 2022-04-13 NOTE — PROGRESS NOTE ADULT - PROBLEM SELECTOR PROBLEM 6
"[facility]  ALONZO Akers M.D.  BRAYDEN Sheridan      Internal Medicine Progress Note    1/7/2018   10:00 AM    Name:  Radha Boswell  MRN:    [id number]     Acct:     [id number]   Room:  [address] [place] [address] Day: 0     Admit Date: 1/5/2018 11:08 PM  PCP: BRAYDEN Edmond    Subjective:     C/C: \"Gas\" and left groin pain    Interval History: Status: Stable/improved.  Up in chair.  No family present.  Wound VAC intact.  Denies chest pain. Mild shortness of air with exertion.  Hemoglobin stable.  Iron deficiency noted and has tolerated oral iron replacement in the past.    Review of Systems   Constitution: Positive for weakness and malaise/fatigue. Negative for chills, decreased appetite and fever.   HENT: Negative for congestion, hoarse voice, nosebleeds and sore throat.    Eyes: Negative for blurred vision, discharge, pain and visual disturbance.   Cardiovascular: Negative for chest pain, dyspnea on exertion, irregular heartbeat, leg swelling, orthopnea and palpitations.   Respiratory: Negative for cough, shortness of breath, sputum production and wheezing.    Hematologic/Lymphatic: Negative for bleeding problem. Does not bruise/bleed easily.   Skin: Negative for dry skin, flushing, itching, poor wound healing, rash and suspicious lesions.   Musculoskeletal: Positive for muscle weakness. Negative for arthritis, back pain, falls, joint pain, joint swelling and myalgias.   Gastrointestinal: Positive for heartburn. Negative for bloating, abdominal pain, change in bowel habit, diarrhea, flatus, melena and nausea.   Genitourinary: Negative for frequency, hesitancy, incomplete emptying, pelvic pain and urgency.   Neurological: Negative for difficulty with concentration, disturbances in coordination, dizziness, headaches, numbness, paresthesias and tremors.   Psychiatric/Behavioral: Negative for altered mental status, hallucinations and memory loss. The patient is not nervous/anxious.  "         Medications:     Allergies: No Known Allergies    Current Meds:   Current Facility-Administered Medications:   •  allopurinol (ZYLOPRIM) tablet 100 mg, 100 mg, Oral, Daily, Rashi Traore MD  •  [START ON 1/30/2018] aspirin EC tablet 81 mg, 81 mg, Oral, Daily, Rashi Traore MD  •  calcitriol (ROCALTROL) capsule 0.25 mcg, 0.25 mcg, Oral, Daily, Rashi Traore MD  •  calcium carbonate (TUMS) chewable tablet 500 mg (200 mg elemental), 2 tablet, Oral, Q4H PRN, Rashi Traore MD  •  clopidogrel (PLAVIX) tablet 75 mg, 75 mg, Oral, Daily, Rashi Traore MD  •  DULoxetine (CYMBALTA) DR capsule 60 mg, 60 mg, Oral, Q12H, Rashi Traore MD  •  fluticasone (FLONASE) 50 MCG/ACT nasal spray 2 spray, 2 spray, Each Nare, Daily, Rashi Traore MD  •  Influenza Vac Subunit Quad (FLUCELVAX) injection 0.5 mL, 0.5 mL, Intramuscular, Once, Rashi Traore MD  •  insulin lispro (humaLOG) injection 2-7 Units, 2-7 Units, Subcutaneous, 4x Daily AC & at Bedtime, Rashi Traore MD  •  isosorbide mononitrate (IMDUR) 24 hr tablet 30 mg, 30 mg, Oral, Q24H, Rashi Traore MD  •  levothyroxine (SYNTHROID, LEVOTHROID) tablet 100 mcg, 100 mcg, Oral, Q AM, Rashi Traore MD  •  lisinopril (PRINIVIL,ZESTRIL) tablet 2.5 mg, 2.5 mg, Oral, Q24H, Rashi Traore MD  •  metFORMIN (GLUCOPHAGE) tablet 500 mg, 500 mg, Oral, BID With Meals, Rashi Traore MD  •  metoprolol tartrate (LOPRESSOR) tablet 12.5 mg, 12.5 mg, Oral, Q12H, Rashi Traore MD  •  mirtazapine (REMERON) tablet 30 mg, 30 mg, Oral, Nightly, Rashi Traore MD  •  nicotine (NICODERM CQ) 21 MG/24HR patch 1 patch, 1 patch, Transdermal, Q24H, Rashi Traore MD  •  oxyCODONE-acetaminophen (PERCOCET)  MG per tablet 1 tablet, 1 tablet, Oral, Q4H PRN, Rashi Traore MD  •  pneumococcal polysaccharide 23-valent (PNEUMOVAX-23) vaccine 0.5 mL, 0.5 mL, Intramuscular, Once,  "Rashi Traoer MD  •  potassium chloride (MICRO-K) CR capsule 20 mEq, 20 mEq, Oral, Once, Rashi Traore MD  •  potassium chloride (MICRO-K) CR capsule 20 mEq, 20 mEq, Oral, Daily, Rashi Traore MD  •  potassium chloride (MICRO-K) CR capsule 40 mEq, 40 mEq, Oral, Once, Rashi Traore MD  •  pregabalin (LYRICA) capsule 200 mg, 200 mg, Oral, Q8H, Rashi Traore MD  •  rivaroxaban (XARELTO) tablet 15 mg, 15 mg, Oral, Daily With Dinner **FOLLOWED BY** [START ON 1/30/2018] rivaroxaban (XARELTO) tablet 20 mg, 20 mg, Oral, Daily With Dinner, Rashi Traore MD  •  tiZANidine (ZANAFLEX) tablet 8 mg, 8 mg, Oral, Q8H, Rashi Traore MD  •  varenicline (CHANTIX) tablet 0.5 mg, 0.5 mg, Oral, Daily, Rashi Traore MD    Data:     Code Status:  No Order    No family history on file.    Social History     Social History   • Marital status: Legally      Spouse name: N/A   • Number of children: N/A   • Years of education: N/A     Occupational History   • Not on file.     Social History Main Topics   • Smoking status: Current Every Day Smoker     Packs/day: 0.50     Types: Cigarettes   • Smokeless tobacco: Not on file   • Alcohol use No   • Drug use: No   • Sexual activity: Not on file     Other Topics Concern   • Not on file     Social History Narrative   • No narrative on file       Vitals:  Ht 175.3 cm (69\")  Wt (!) 167 kg (368 lb)  BMI 54.34 kg/m2    T 98.5, P 75, RR 18, BP 95/53, pulse ox 96% on room air        I/O (24Hr):  No intake or output data in the 24 hours ending 01/07/18 1000    Labs and imaging:      Lab Results (last 24 hours)     Procedure Component Value Units Date/Time    POC Glucose Once [[id number]]  (Normal) Collected:  01/06/18 1149    Specimen:  Blood Updated:  01/06/18 1201     Glucose 110 mg/dL       : MUMTAZ Lamb KristaMeter ID: [id number]       POC Glucose Once [[id number]]  (Normal) Collected:  01/06/18 1739    Specimen:  Blood " Updated:  01/06/18 1750     Glucose 122 mg/dL       : [id number] Lamb KristaMeter ID: [id number]       POC Glucose Once [[id number]]  (Normal) Collected:  01/06/18 2151    Specimen:  Blood Updated:  01/06/18 2202     Glucose 109 mg/dL       : [id number] Coles MorganMeter ID: [id number]       CBC & Differential [[id number]] Collected:  01/07/18 0422    Specimen:  Blood Updated:  01/07/18 0455    Narrative:       The following orders were created for panel order CBC & Differential.  Procedure                               Abnormality         Status                     ---------                               -----------         ------                     CBC Auto Differential[[id number]]        Abnormal            Final result                 Please view results for these tests on the individual orders.    CBC Auto Differential [[id number]]  (Abnormal) Collected:  01/07/18 0422    Specimen:  Blood Updated:  01/07/18 0455     WBC 11.71 (H) 10*3/mm3      RBC 2.98 (L) 10*6/mm3      Hemoglobin 8.1 (L) g/dL      Hematocrit 26.5 (L) %      MCV 88.9 fL      MCH 27.2 (L) pg      MCHC 30.6 (L) g/dL      RDW 15.9 (H) %      RDW-SD 50.7 fl      MPV 10.6 fL      Platelets 461 (H) 10*3/mm3      Neutrophil % 69.7 %      Lymphocyte % 19.0 %      Monocyte % 7.0 %      Eosinophil % 3.1 %      Basophil % 0.2 %      Immature Grans % 1.0 %      Neutrophils, Absolute 8.17 10*3/mm3      Lymphocytes, Absolute 2.22 10*3/mm3      Monocytes, Absolute 0.82 10*3/mm3      Eosinophils, Absolute 0.36 10*3/mm3      Basophils, Absolute 0.02 10*3/mm3      Immature Grans, Absolute 0.12 (H) 10*3/mm3      nRBC 0.0 /100 WBC     POC Glucose Once [[id number]]  (Normal) Collected:  01/07/18 0430    Specimen:  Blood Updated:  01/07/18 0513     Glucose 121 mg/dL       : [email or web address] Papo CeyleighMeter ID: [id number]       Iron Profile [[id number]]  (Abnormal) Collected:  01/07/18 0422    Specimen:  Blood Updated:  01/07/18 0522     Iron 26 (L)  mcg/dL      TIBC 291 mcg/dL      Iron Saturation 9 (L) %     Basic Metabolic Panel [[id number]]  (Abnormal) Collected:  01/07/18 0422    Specimen:  Blood Updated:  01/07/18 0529     Glucose 110 (H) mg/dL      BUN 11 mg/dL      Creatinine 0.97 mg/dL      Sodium 143 mmol/L      Potassium 3.8 mmol/L      Chloride 103 mmol/L      CO2 33.0 (H) mmol/L      Calcium 9.5 mg/dL      eGFR Non African Amer 62 mL/min/1.73      BUN/Creatinine Ratio 11.3     Anion Gap 7.0 mmol/L     Narrative:       GFR Normal >60  Chronic Kidney Disease <60  Kidney Failure <15    Ferritin [[id number]]  (Normal) Collected:  01/07/18 0422    Specimen:  Blood Updated:  01/07/18 0550     Ferritin 106.00 ng/mL     Vitamin B12 [[id number]]  (Abnormal) Collected:  01/07/18 0422    Specimen:  Blood Updated:  01/07/18 0620     Vitamin B-12 212 (L) pg/mL     Folate [[id number]] Collected:  01/07/18 0422    Specimen:  Blood Updated:  01/07/18 0637     Folate 3.40 ng/mL     Magnesium [[id number]]  (Normal) Collected:  01/07/18 0422    Specimen:  Blood Updated:  01/07/18 0749     Magnesium 1.7 mg/dL           Physical Examination:        Physical Exam   Constitutional: Vital signs are normal. She appears well-developed and well-nourished. She is cooperative.   HENT:   Head: Normocephalic and atraumatic.   Nose: Nose normal.   Mouth/Throat: Oropharynx is clear and moist.   Eyes: Conjunctivae, EOM and lids are normal. Pupils are equal, round, and reactive to light.   Neck: Trachea normal and normal range of motion. Neck supple.   Cardiovascular: Normal rate, regular rhythm and normal heart sounds.    Abdominal: Soft. Normal appearance and bowel sounds are normal.   Obese   Musculoskeletal: Normal range of motion.   Wound VAC to left groin intact   Lymphadenopathy:     She has no cervical adenopathy.     She has no axillary adenopathy.        Left: No supraclavicular adenopathy present.   Neurological: She is alert. She has normal strength. No cranial nerve deficit  or sensory deficit.   Skin: Skin is warm, dry and intact. No petechiae and no rash noted. No cyanosis or erythema. Nails show no clubbing.   Psychiatric: She has a normal mood and affect. Her speech is normal and behavior is normal. Judgment and thought content normal. Cognition and memory are normal.         Assessment:          Past Medical History:   Diagnosis Date   • Arthritis    • Diabetes mellitus    • Hyperlipidemia    • Hypertension    • Migraine         Plan:           1. Left femoral artery bleed with hematoma  2.   Coronary artery disease  3.   Recent MI  4.   Diabetes type II with complication  5.   History of hypertension assisting with Hypotension  6.   Multifactorial Anemia to include acute blood loss and iron deficiency  7.   Hypokalemia - resolved  8.   Leukocytosis - improving     Continue with current plan of care.  Continue wound care. Labs in a.m.  Add ferrous sulfate 325 mg 3 times a day, Protonix 40 mg daily, and Colace when necessary for constipation.       Electronically signed by BRAYDEN Cleveland on 1/7/2018 at 10:00 AM            Hyperkalemia

## 2022-04-13 NOTE — PROGRESS NOTE ADULT - SUBJECTIVE AND OBJECTIVE BOX
Date/Time Patient Seen:  		  Referring MD:   Data Reviewed	       Patient is a 71y old  Female who presents with a chief complaint of shortness of breath (12 Apr 2022 13:28)      Subjective/HPI     PAST MEDICAL & SURGICAL HISTORY:  Diabetes mellitus II    HTN (hypertension)    h/o Anxiety attack    Depression    h/o Myocardial infarct 2007    CAD (coronary artery disease)    CAD (coronary artery disease)    h/o Hepatitis A 1969  currently resolved, no symptoms    PAD (peripheral artery disease)    Murmur, cardiac    h/o Smoking  quitted 3/2012    CRF (chronic renal failure), unspecified stage    Dialysis patient    Anemia secondary to renal failure    HTN (hypertension)    coronary stent 2007    s/p Ovarian cyst removal    s/p surgical removal of benign Skin lesion epigastric area          Medication list         MEDICATIONS  (STANDING):  apixaban 2.5 milliGRAM(s) Oral every 12 hours  aspirin enteric coated 81 milliGRAM(s) Oral daily  atorvastatin 40 milliGRAM(s) Oral at bedtime  calcium acetate 667 milliGRAM(s) Oral three times a day with meals  ceFAZolin   IVPB 1000 milliGRAM(s) IV Intermittent every 24 hours  cloNIDine 0.1 milliGRAM(s) Oral every 12 hours  clopidogrel Tablet 75 milliGRAM(s) Oral daily  dextrose 5%. 1000 milliLiter(s) (100 mL/Hr) IV Continuous <Continuous>  dextrose 5%. 1000 milliLiter(s) (50 mL/Hr) IV Continuous <Continuous>  dextrose 50% Injectable 25 Gram(s) IV Push once  dextrose 50% Injectable 12.5 Gram(s) IV Push once  dextrose 50% Injectable 25 Gram(s) IV Push once  diltiazem    Tablet 60 milliGRAM(s) Oral every 8 hours  epoetin fawad-epbx (RETACRIT) Injectable 38762 Unit(s) IV Push <User Schedule>  glucagon  Injectable 1 milliGRAM(s) IntraMuscular once  imipramine 50 milliGRAM(s) Oral at bedtime  insulin glargine Injectable (LANTUS) 10 Unit(s) SubCutaneous every morning  insulin lispro (ADMELOG) corrective regimen sliding scale   SubCutaneous at bedtime  insulin lispro (ADMELOG) corrective regimen sliding scale   SubCutaneous three times a day before meals  lactobacillus acidophilus 1 Tablet(s) Oral two times a day  metoprolol tartrate 100 milliGRAM(s) Oral every 12 hours  Nephro-elvie 1 Tablet(s) Oral daily  pantoprazole    Tablet 40 milliGRAM(s) Oral before breakfast    MEDICATIONS  (PRN):  acetaminophen     Tablet .. 650 milliGRAM(s) Oral every 6 hours PRN Temp greater or equal to 38C (100.4F), Mild Pain (1 - 3)  aluminum hydroxide/magnesium hydroxide/simethicone Suspension 30 milliLiter(s) Oral every 4 hours PRN Dyspepsia  dextrose Oral Gel 15 Gram(s) Oral once PRN Blood Glucose LESS THAN 70 milliGRAM(s)/deciliter  melatonin 3 milliGRAM(s) Oral at bedtime PRN Insomnia  ondansetron Injectable 4 milliGRAM(s) IV Push every 8 hours PRN Nausea and/or Vomiting         Vitals log        ICU Vital Signs Last 24 Hrs  T(C): 36.8 (13 Apr 2022 04:39), Max: 37.3 (12 Apr 2022 19:59)  T(F): 98.3 (13 Apr 2022 04:39), Max: 99.1 (12 Apr 2022 19:59)  HR: 61 (13 Apr 2022 04:39) (61 - 94)  BP: 112/58 (13 Apr 2022 04:39) (112/58 - 167/71)  BP(mean): --  ABP: --  ABP(mean): --  RR: 17 (13 Apr 2022 04:39) (17 - 18)  SpO2: 91% (13 Apr 2022 04:39) (88% - 99%)           Input and Output:  I&O's Detail    12 Apr 2022 07:01  -  13 Apr 2022 05:50  --------------------------------------------------------  IN:  Total IN: 0 mL    OUT:    Other (mL): 1300 mL  Total OUT: 1300 mL    Total NET: -1300 mL          Lab Data                        9.3    15.43 )-----------( 476      ( 12 Apr 2022 08:07 )             28.4     04-12    133<L>  |  94<L>  |  73<H>  ----------------------------<  269<H>  4.4   |  25  |  7.60<H>    Ca    9.6      12 Apr 2022 08:07              Review of Systems	      Objective     Physical Examination  heart s1s2  lung dc BS  abd soft        Pertinent Lab findings & Imaging      Dexter:  NO   Adequate UO     I&O's Detail    12 Apr 2022 07:01  -  13 Apr 2022 05:50  --------------------------------------------------------  IN:  Total IN: 0 mL    OUT:    Other (mL): 1300 mL  Total OUT: 1300 mL    Total NET: -1300 mL               Discussed with:     Cultures:	        Radiology

## 2022-04-13 NOTE — PROGRESS NOTE ADULT - PROVIDER SPECIALTY LIST ADULT
Infectious Disease
Nephrology
Podiatry
Pulmonology
Hospitalist
Infectious Disease
Nephrology
Nephrology
Palliative Care
Pulmonology
Pulmonology
Vascular Surgery
Cardiology
Cardiology
Hospitalist
Infectious Disease
Palliative Care
Palliative Care
Cardiology
Infectious Disease
Infectious Disease
Nephrology
Pulmonology
Pulmonology
Cardiology
Cardiology
Podiatry
Cardiology
Podiatry
Podiatry
Hospitalist

## 2022-04-13 NOTE — PROGRESS NOTE ADULT - ASSESSMENT
JOSE F LAO 71 f 4/5/2022 1948 DR HARRY ALBRIGHT     REVIEW OF SYMPTOMS      Able to give ROS  Yes     RELIABLE +/-   CONSTITUTIONAL Weakness Yes  Chills No   ENDOCRINE  No heat or cold intolerance    ALLERGY No hives  Sore throat No stridor  RESP Coughing blood no  Shortness of breath YES   NEURO No Headache  Confusion Pain neck No   CARDIAC No Chest pain No Palpitations   GI  Pain abdomen NO   Vomiting NO     PHYSICAL EXAM    HEENT Unremarkable  atraumatic   RESP Fair air entry EXP prolonged    Harsh breath sound Resp distres mild   CARDIAC S1 S2 No S3     NO JVD    ABDOMEN SOFT BS PRESENT NOT DISTENDED No hepatosplenomegaly PEDAL EDEMA present No calf tenderness  NO rash       DOA/CC/PROBLEMS poa .  4/5/2022   70yo female bib ems with sob, pt states she has missed the last 2 rounds of dialysis having worsening sob  ID following - on ABX - on Zosyn -   ct chest report - poss pna - pulm edema - nodules - mucus plugging -   LE ulcers are vascular in etiology, but MRI showed evidence of osteomyelitis of L medial malleolus and distal aspect of R 1st toe. No drainage or surrounding cellulitis noted from LE ulcers. Tissue cultures from 3/30/22 grew klebsiella oxytoca/raoutella oxytoca, E. coli, MSSA      COVID/ICU/CODE STATUS.                       COVID  STATUS.   4/11/2022 pcr (-)         ICU STAY. none  GOC.      BEST PRACTICE ISSUES.                                                  HEAD OF BED ELEVATION. Yes  DVT PROPHYLAXIS.  4/7/2022 apixaban 2.5x2 (a fib)     ELDER PROPHYLAXIS. 4/5 protonix 40                                                                                         DIET.   4/6 cons carb              PROBLEM DATA/PLAN.    HEMODYNAMICS.   Monitor bp Target MAP 65 (+)    RESP.   Monitor po Target po 90-95%      PMH/PSH PROBLEMS.  Management continued/modified as indicated    OXYGEN REQUIREMENTS.  4/12/2022 ra 91%    SHORTNESS OF BREATH.  Likely sec fluid overload in setting ESRD   On hd    INFECTION A/R.  Pt on ancef started 4/11/2022 for osteomyelitus   3/30 tissue c Klebs E coli Staph a left medial malleolus   INFECTION MARCUM.  W 4/11-4/12/2022 w 16.2 - 15  blod c 4/5/2022 (-)   BACTERIO.  3/30 tissue c Klebs E coli Staph a   MOELCULAR.   4/8/2022 mrsa (-)   ANTIBIO.   4/11 ancef osteomyelitis)     CAD.  4/5/2022 asa 81   4/5/2022 plavix 75   4/5/2022 atorvasat 40     A fib.  4/7/2022 apixaban 2.5x2 (a fib)   4/7/2022 cardizem 60.3   4/6/2022 metoprolol 100.2     ANEMIA.   Hb 4/11-4/12/2022 Hb 10 - 9     ESRD.   On HD     TIME SPENT   Over 25 minutes aggregate care time spent on encounter; activities included   direct patient care, counseling and/or coordinating care reviewing notes, lab data/ imaging , discussion with multidisciplinary team/ patient  /family and explaining in detail risks, benefits, alternatives  of the recommendations     JOSE F LAO 71 f 4/5/2022 1948 DR HARRY ALBRIGHT

## 2022-04-13 NOTE — PROGRESS NOTE ADULT - SUBJECTIVE AND OBJECTIVE BOX
SHILO KOHLER    [facility] 326 D1    Allergies    latex (Unknown)  No Known Drug Allergies    Intolerances        PAST MEDICAL & SURGICAL HISTORY:  Diabetes mellitus II    HTN (hypertension)    h/o Anxiety attack    Depression    h/o Myocardial infarct 2007    CAD (coronary artery disease)    h/o Hepatitis A 1969  currently resolved, no symptoms    PAD (peripheral artery disease)    Murmur, cardiac    h/o Smoking  quitted 3/2012    CRF (chronic renal failure), unspecified stage    Dialysis patient    Anemia secondary to renal failure    HTN (hypertension)    coronary stent 2007    s/p Ovarian cyst removal    s/p surgical removal of benign Skin lesion epigastric area        FAMILY HISTORY:      Home Medications:  acetaminophen 325 mg oral tablet: 2 tab(s) orally every 6 hours, As needed, Temp greater or equal to 38C (100.4F), Mild Pain (1 - 3) (12 Apr 2022 08:12)  aspirin 81 mg oral delayed release tablet: 1 tab(s) orally once a day OTC  (12 Apr 2022 19:04)  atorvastatin 40 mg oral tablet: 1 tab(s) orally once a day (at bedtime)  home/hosp (05 Apr 2022 18:40)  ceFAZolin: 3 times a week ,Patient will require abx with HD sessions - cefazolin 2g-2g-3g with HD x 4 weeks  - weekly CBC with diff, CMP, ESR and CRP (12 Apr 2022 08:12)  cloNIDine 0.1 mg oral tablet: 1 tab(s) orally 2 times a day (05 Apr 2022 18:40)  clopidogrel 75 mg oral tablet: 1 tab(s) orally once a day  home/hosp (05 Apr 2022 18:40)  Emla 2.5%-2.5% topical cream: Apply topically to affected area once on Dialysis days (05 Apr 2022 18:40)  epoetin fawad: 59829 unit(s) intravenous 3 times a week with HD SESSION as per nephrologist orders  (12 Apr 2022 08:12)  Fiasp 100 units/mL injectable solution: 3-5 unit(s) injectable 3 times a day (with meals) as per sliding scale RESUME PREADMISSION COVERAGE  (12 Apr 2022 19:04)  imipramine 50 mg oral tablet: 1 tab(s) orally once a day  home/hosp (05 Apr 2022 18:40)  melatonin 3 mg oral tablet: 1 tab(s) orally once a day (at bedtime), As needed, Insomnia OTC  (12 Apr 2022 19:04)  Protonix 40 mg oral delayed release tablet: 1 tab(s) orally once a day  hosp (05 Apr 2022 18:40)  Jennifer-Elvie oral tablet: 1 tab(s) orally once a day (05 Apr 2022 18:40)  Tresiba FlexTouch 100 units/mL subcutaneous solution: 15 unit(s) subcutaneous once a day (at bedtime) RESUME PREADMISSION COVERAGE  (12 Apr 2022 19:04)      MEDICATIONS  (STANDING):  apixaban 2.5 milliGRAM(s) Oral every 12 hours  aspirin enteric coated 81 milliGRAM(s) Oral daily  atorvastatin 40 milliGRAM(s) Oral at bedtime  calcium acetate 667 milliGRAM(s) Oral three times a day with meals  ceFAZolin   IVPB 1000 milliGRAM(s) IV Intermittent every 24 hours  cloNIDine 0.1 milliGRAM(s) Oral every 12 hours  clopidogrel Tablet 75 milliGRAM(s) Oral daily  dextrose 5%. 1000 milliLiter(s) (100 mL/Hr) IV Continuous <Continuous>  dextrose 5%. 1000 milliLiter(s) (50 mL/Hr) IV Continuous <Continuous>  dextrose 50% Injectable 25 Gram(s) IV Push once  dextrose 50% Injectable 12.5 Gram(s) IV Push once  dextrose 50% Injectable 25 Gram(s) IV Push once  diltiazem    Tablet 60 milliGRAM(s) Oral every 8 hours  epoetin fawad-epbx (RETACRIT) Injectable 20836 Unit(s) IV Push <User Schedule>  glucagon  Injectable 1 milliGRAM(s) IntraMuscular once  imipramine 50 milliGRAM(s) Oral at bedtime  insulin glargine Injectable (LANTUS) 10 Unit(s) SubCutaneous every morning  insulin lispro (ADMELOG) corrective regimen sliding scale   SubCutaneous at bedtime  insulin lispro (ADMELOG) corrective regimen sliding scale   SubCutaneous three times a day before meals  lactobacillus acidophilus 1 Tablet(s) Oral two times a day  metoprolol tartrate 100 milliGRAM(s) Oral every 12 hours  Nephro-elvie 1 Tablet(s) Oral daily  pantoprazole    Tablet 40 milliGRAM(s) Oral before breakfast    MEDICATIONS  (PRN):  acetaminophen     Tablet .. 650 milliGRAM(s) Oral every 6 hours PRN Temp greater or equal to 38C (100.4F), Mild Pain (1 - 3)  aluminum hydroxide/magnesium hydroxide/simethicone Suspension 30 milliLiter(s) Oral every 4 hours PRN Dyspepsia  dextrose Oral Gel 15 Gram(s) Oral once PRN Blood Glucose LESS THAN 70 milliGRAM(s)/deciliter  melatonin 3 milliGRAM(s) Oral at bedtime PRN Insomnia  ondansetron Injectable 4 milliGRAM(s) IV Push every 8 hours PRN Nausea and/or Vomiting      Diet, Consistent Carbohydrate Renal w/Evening Snack:   Easy to Chew (EASYTOCHEW)  Supplement Feeding Modality:  Oral  Nepro Cans or Servings Per Day:  1       Frequency:  Daily (04-06-22 @ 12:14) [Active]          Vital Signs Last 24 Hrs  T(C): 36.8 (13 Apr 2022 04:39), Max: 37.3 (12 Apr 2022 19:59)  T(F): 98.3 (13 Apr 2022 04:39), Max: 99.1 (12 Apr 2022 19:59)  HR: 61 (13 Apr 2022 04:39) (61 - 94)  BP: 112/58 (13 Apr 2022 04:39) (112/58 - 167/71)  BP(mean): --  RR: 17 (13 Apr 2022 04:39) (17 - 18)  SpO2: 91% (13 Apr 2022 04:39) (88% - 99%)      04-12-22 @ 07:01  -  04-13-22 @ 06:43  --------------------------------------------------------  IN: 0 mL / OUT: 1300 mL / NET: -1300 mL              LABS:                        9.3    15.43 )-----------( 476      ( 12 Apr 2022 08:07 )             28.4     04-12    133<L>  |  94<L>  |  73<H>  ----------------------------<  269<H>  4.4   |  25  |  7.60<H>    Ca    9.6      12 Apr 2022 08:07                WBC:  WBC Count: 15.43 K/uL (04-12 @ 08:07)  WBC Count: 16.23 K/uL (04-11 @ 07:36)  WBC Count: 10.66 K/uL (04-09 @ 08:01)      MICROBIOLOGY:  RECENT CULTURES:                  Sodium:  Sodium, Serum: 133 mmol/L (04-12 @ 08:07)  Sodium, Serum: 134 mmol/L (04-11 @ 07:36)  Sodium, Serum: 132 mmol/L (04-09 @ 08:01)      7.60 mg/dL 04-12 @ 08:07  6.10 mg/dL 04-11 @ 07:36  4.50 mg/dL 04-09 @ 08:01      Hemoglobin:  Hemoglobin: 9.3 g/dL (04-12 @ 08:07)  Hemoglobin: 10.0 g/dL (04-11 @ 07:36)  Hemoglobin: 9.5 g/dL (04-09 @ 08:01)      Platelets: Platelet Count - Automated: 476 K/uL (04-12 @ 08:07)  Platelet Count - Automated: 443 K/uL (04-11 @ 07:36)  Platelet Count - Automated: 403 K/uL (04-09 @ 08:01)              RADIOLOGY & ADDITIONAL STUDIES:      MICROBIOLOGY:  RECENT CULTURES:

## 2022-04-13 NOTE — PROGRESS NOTE ADULT - NUTRITIONAL ASSESSMENT
MEDICATIONS  (STANDING):  amLODIPine   Tablet 7.5 milliGRAM(s) Oral every 24 hours  aspirin enteric coated 81 milliGRAM(s) Oral daily  atorvastatin 40 milliGRAM(s) Oral at bedtime  calcium acetate 667 milliGRAM(s) Oral three times a day with meals  cloNIDine 0.1 milliGRAM(s) Oral every 12 hours  clopidogrel Tablet 75 milliGRAM(s) Oral daily  dextrose 5%. 1000 milliLiter(s) (100 mL/Hr) IV Continuous <Continuous>  dextrose 5%. 1000 milliLiter(s) (50 mL/Hr) IV Continuous <Continuous>  dextrose 50% Injectable 25 Gram(s) IV Push once  dextrose 50% Injectable 12.5 Gram(s) IV Push once  dextrose 50% Injectable 25 Gram(s) IV Push once  epoetin fawad-epbx (RETACRIT) Injectable 71380 Unit(s) IV Push <User Schedule>  glucagon  Injectable 1 milliGRAM(s) IntraMuscular once  heparin   Injectable 5000 Unit(s) SubCutaneous every 12 hours  imipramine 50 milliGRAM(s) Oral at bedtime  insulin glargine Injectable (LANTUS) 7 Unit(s) SubCutaneous every morning  insulin lispro (ADMELOG) corrective regimen sliding scale   SubCutaneous at bedtime  insulin lispro (ADMELOG) corrective regimen sliding scale   SubCutaneous three times a day before meals  lactobacillus acidophilus 1 Tablet(s) Oral two times a day  Nephro-elvie 1 Tablet(s) Oral daily  pantoprazole    Tablet 40 milliGRAM(s) Oral before breakfast  piperacillin/tazobactam IVPB.. 3.375 Gram(s) IV Intermittent every 12 hours

## 2022-04-13 NOTE — PROGRESS NOTE ADULT - SUBJECTIVE AND OBJECTIVE BOX
Patient is a 71y Female with a known history of :  Fluid overload [E87.70]    ESRD on dialysis [N18.6]    Poor compliance [Z91.19]    Hyperkalemia [E87.5]    DM (diabetes mellitus) [E11.9]    HTN (hypertension) [I10]    CAD (coronary artery disease) [I25.10]    Prophylactic measure [Z29.9]    Foot infection [L08.9]    Atrial fibrillation with tachycardic ventricular rate [I48.91]    Diarrhea [R19.7]    PAD (peripheral artery disease) [I73.9]      HPI:  72yo female bib ems with sob, pt states she has missed the last 2 rounds of dialysis and is due today, and has been having worsening sob, no cough, fever, chills, no chest pain no other complaints .Found to have hyperkalemia Admitted  to telemetry unit for monitoring , send 3 sets of cardiac enzymes to rule out acute coronary event, obtain ECHO to evaluate LVEF, cardiology consult  ,continue current management, O2 supply, anticoagulation plan as per cardiology consult Nephrology consult stat requested ,management d/w Dr Perez and  Palliative care consult requested ,to discuss advance directives and complete MOLST  (05 Apr 2022 07:22)      REVIEW OF SYSTEMS:    CONSTITUTIONAL: No fever, weight loss, or fatigue  EYES: No eye pain, visual disturbances, or discharge  ENMT:  No difficulty hearing, tinnitus, vertigo; No sinus or throat pain  NECK: No pain or stiffness  BREASTS: No pain, masses, or nipple discharge  RESPIRATORY: No cough, wheezing, chills or hemoptysis; No shortness of breath  CARDIOVASCULAR: No chest pain, palpitations, dizziness, or leg swelling  GASTROINTESTINAL: No abdominal or epigastric pain. No nausea, vomiting, or hematemesis; No diarrhea or constipation. No melena or hematochezia.  GENITOURINARY: No dysuria, frequency, hematuria, or incontinence  NEUROLOGICAL: No headaches, memory loss, loss of strength, numbness, or tremors  SKIN: No itching, burning, rashes, or lesions   LYMPH NODES: No enlarged glands  ENDOCRINE: No heat or cold intolerance; No hair loss  MUSCULOSKELETAL: No joint pain or swelling; No muscle, back, or extremity pain  PSYCHIATRIC: No depression, anxiety, mood swings, or difficulty sleeping  HEME/LYMPH: No easy bruising, or bleeding gums  ALLERGY AND IMMUNOLOGIC: No hives or eczema    MEDICATIONS  (STANDING):  apixaban 2.5 milliGRAM(s) Oral every 12 hours  aspirin enteric coated 81 milliGRAM(s) Oral daily  atorvastatin 40 milliGRAM(s) Oral at bedtime  calcium acetate 667 milliGRAM(s) Oral three times a day with meals  ceFAZolin   IVPB 1000 milliGRAM(s) IV Intermittent every 24 hours  cloNIDine 0.1 milliGRAM(s) Oral every 12 hours  clopidogrel Tablet 75 milliGRAM(s) Oral daily  dextrose 5%. 1000 milliLiter(s) (100 mL/Hr) IV Continuous <Continuous>  dextrose 5%. 1000 milliLiter(s) (50 mL/Hr) IV Continuous <Continuous>  dextrose 50% Injectable 25 Gram(s) IV Push once  dextrose 50% Injectable 12.5 Gram(s) IV Push once  dextrose 50% Injectable 25 Gram(s) IV Push once  diltiazem    Tablet 60 milliGRAM(s) Oral every 8 hours  epoetin fawad-epbx (RETACRIT) Injectable 94234 Unit(s) IV Push <User Schedule>  glucagon  Injectable 1 milliGRAM(s) IntraMuscular once  imipramine 50 milliGRAM(s) Oral at bedtime  insulin glargine Injectable (LANTUS) 15 Unit(s) SubCutaneous every morning  insulin lispro (ADMELOG) corrective regimen sliding scale   SubCutaneous three times a day before meals  insulin lispro (ADMELOG) corrective regimen sliding scale   SubCutaneous at bedtime  lactobacillus acidophilus 1 Tablet(s) Oral two times a day  metoprolol tartrate 100 milliGRAM(s) Oral every 12 hours  Nephro-elvie 1 Tablet(s) Oral daily  pantoprazole    Tablet 40 milliGRAM(s) Oral before breakfast    MEDICATIONS  (PRN):  acetaminophen     Tablet .. 650 milliGRAM(s) Oral every 6 hours PRN Temp greater or equal to 38C (100.4F), Mild Pain (1 - 3)  aluminum hydroxide/magnesium hydroxide/simethicone Suspension 30 milliLiter(s) Oral every 4 hours PRN Dyspepsia  dextrose Oral Gel 15 Gram(s) Oral once PRN Blood Glucose LESS THAN 70 milliGRAM(s)/deciliter  melatonin 3 milliGRAM(s) Oral at bedtime PRN Insomnia  ondansetron Injectable 4 milliGRAM(s) IV Push every 8 hours PRN Nausea and/or Vomiting      ALLERGIES: latex (Unknown)  No Known Drug Allergies      FAMILY HISTORY:      PHYSICAL EXAMINATION:  -----------------------------  T(C): 36.8 (04-13-22 @ 04:39), Max: 37.3 (04-12-22 @ 19:59)  HR: 57 (04-13-22 @ 09:39) (57 - 94)  BP: 112/58 (04-13-22 @ 04:39) (112/58 - 167/71)  RR: 17 (04-13-22 @ 04:39) (17 - 18)  SpO2: 91% (04-13-22 @ 04:39) (88% - 99%)  Wt(kg): --    04-12 @ 07:01  -  04-13 @ 07:00  --------------------------------------------------------  IN:  Total IN: 0 mL    OUT:    Other (mL): 1300 mL  Total OUT: 1300 mL    Total NET: -1300 mL            VITALS  T(C): 36.8 (04-13-22 @ 04:39), Max: 37.3 (04-12-22 @ 19:59)  HR: 57 (04-13-22 @ 09:39) (57 - 94)  BP: 112/58 (04-13-22 @ 04:39) (112/58 - 167/71)  RR: 17 (04-13-22 @ 04:39) (17 - 18)  SpO2: 91% (04-13-22 @ 04:39) (88% - 99%)    Constitutional: well developed, normal appearance, well groomed, well nourished, no deformities and no acute distress.   Eyes: the conjunctiva exhibited no abnormalities and the eyelids demonstrated no xanthelasmas.   HEENT: normal oral mucosa, no oral pallor and no oral cyanosis.   Neck: normal jugular venous A waves present, normal jugular venous V waves present and no jugular venous wilson A waves.   Pulmonary: no respiratory distress, normal respiratory rhythm and effort, no accessory muscle use and lungs were clear to auscultation bilaterally.   Cardiovascular: heart rate and rhythm were normal, normal S1 and S2 and no murmur, gallop, rub, heave or thrill are present.   Abdomen: soft, non-tender, no hepato-splenomegaly and no abdominal mass palpated.   Musculoskeletal: the gait could not be assessed..   Extremities: no clubbing of the fingernails, no localized cyanosis, no petechial hemorrhages and no ischemic changes.   Skin: normal skin color and pigmentation, no rash, no venous stasis, no skin lesions, no skin ulcer and no xanthoma was observed.   Psychiatric: oriented to person, place, and time, the affect was normal, the mood was normal and not feeling anxious.     LABS:   --------  04-12    133<L>  |  94<L>  |  73<H>  ----------------------------<  269<H>  4.4   |  25  |  7.60<H>    Ca    9.6      12 Apr 2022 08:07                           9.3    15.43 )-----------( 476      ( 12 Apr 2022 08:07 )             28.4                 RADIOLOGY:  -----------------    ECG:     ECHO:

## 2022-04-13 NOTE — CONSULT NOTE ADULT - SUBJECTIVE AND OBJECTIVE BOX
Patient is a 71y old  Female who presents with a chief complaint of shortness of breath (13 Apr 2022 06:43)      Reason For Consult: dm2 uncontrolled    HPI:  72yo female bib ems with sob, pt states she has missed the last 2 rounds of dialysis and is due today, and has been having worsening sob, no cough, fever, chills, no chest pain no other complaints .Found to have hyperkalemia Admitted  to telemetry unit for monitoring , send 3 sets of cardiac enzymes to rule out acute coronary event, obtain ECHO to evaluate LVEF, cardiology consult  ,continue current management, O2 supply, anticoagulation plan as per cardiology consult Nephrology consult stat requested ,management d/w Dr Perez and  Palliative care consult requested ,to discuss advance directives and complete MOLST  (05 Apr 2022 07:22)      PAST MEDICAL & SURGICAL HISTORY:  Diabetes mellitus II    HTN (hypertension)    h/o Anxiety attack    Depression    h/o Myocardial infarct 2007    CAD (coronary artery disease)    h/o Hepatitis A 1969  currently resolved, no symptoms    PAD (peripheral artery disease)    Murmur, cardiac    h/o Smoking  quitted 3/2012    CRF (chronic renal failure), unspecified stage    Dialysis patient    Anemia secondary to renal failure    HTN (hypertension)    coronary stent 2007    s/p Ovarian cyst removal    s/p surgical removal of benign Skin lesion epigastric area        FAMILY HISTORY:        Social History:    MEDICATIONS  (STANDING):  apixaban 2.5 milliGRAM(s) Oral every 12 hours  aspirin enteric coated 81 milliGRAM(s) Oral daily  atorvastatin 40 milliGRAM(s) Oral at bedtime  calcium acetate 667 milliGRAM(s) Oral three times a day with meals  ceFAZolin   IVPB 1000 milliGRAM(s) IV Intermittent every 24 hours  cloNIDine 0.1 milliGRAM(s) Oral every 12 hours  clopidogrel Tablet 75 milliGRAM(s) Oral daily  dextrose 5%. 1000 milliLiter(s) (100 mL/Hr) IV Continuous <Continuous>  dextrose 5%. 1000 milliLiter(s) (50 mL/Hr) IV Continuous <Continuous>  dextrose 50% Injectable 25 Gram(s) IV Push once  dextrose 50% Injectable 12.5 Gram(s) IV Push once  dextrose 50% Injectable 25 Gram(s) IV Push once  diltiazem    Tablet 60 milliGRAM(s) Oral every 8 hours  epoetin fawad-epbx (RETACRIT) Injectable 57887 Unit(s) IV Push <User Schedule>  glucagon  Injectable 1 milliGRAM(s) IntraMuscular once  imipramine 50 milliGRAM(s) Oral at bedtime  insulin glargine Injectable (LANTUS) 10 Unit(s) SubCutaneous every morning  insulin lispro (ADMELOG) corrective regimen sliding scale   SubCutaneous three times a day before meals  insulin lispro (ADMELOG) corrective regimen sliding scale   SubCutaneous at bedtime  lactobacillus acidophilus 1 Tablet(s) Oral two times a day  metoprolol tartrate 100 milliGRAM(s) Oral every 12 hours  Nephro-elvie 1 Tablet(s) Oral daily  pantoprazole    Tablet 40 milliGRAM(s) Oral before breakfast    MEDICATIONS  (PRN):  acetaminophen     Tablet .. 650 milliGRAM(s) Oral every 6 hours PRN Temp greater or equal to 38C (100.4F), Mild Pain (1 - 3)  aluminum hydroxide/magnesium hydroxide/simethicone Suspension 30 milliLiter(s) Oral every 4 hours PRN Dyspepsia  dextrose Oral Gel 15 Gram(s) Oral once PRN Blood Glucose LESS THAN 70 milliGRAM(s)/deciliter  melatonin 3 milliGRAM(s) Oral at bedtime PRN Insomnia  ondansetron Injectable 4 milliGRAM(s) IV Push every 8 hours PRN Nausea and/or Vomiting        T(C): 36.8 (04-13-22 @ 04:39), Max: 37.3 (04-12-22 @ 19:59)  HR: 61 (04-13-22 @ 04:39) (61 - 94)  BP: 112/58 (04-13-22 @ 04:39) (112/58 - 167/71)  RR: 17 (04-13-22 @ 04:39) (17 - 18)  SpO2: 91% (04-13-22 @ 04:39) (88% - 99%)  Wt(kg): --    PHYSICAL EXAM:  CHEST/LUNG: Clear to percussion bilaterally; No rales, rhonchi, wheezing, or rubs  HEART: Regular rate and rhythm; No murmurs, rubs, or gallops  ABDOMEN: Soft, Nontender, Nondistended; Bowel sounds present  EXTREMITIES:  2+ Peripheral Pulses, No clubbing, cyanosis, or edema  SKIN: No rashes or lesions    CAPILLARY BLOOD GLUCOSE      POCT Blood Glucose.: 220 mg/dL (12 Apr 2022 21:12)  POCT Blood Glucose.: 141 mg/dL (12 Apr 2022 18:19)  POCT Blood Glucose.: 367 mg/dL (12 Apr 2022 11:44)  POCT Blood Glucose.: 297 mg/dL (12 Apr 2022 08:28)                            9.3    15.43 )-----------( 476      ( 12 Apr 2022 08:07 )             28.4       CMP:  04-12 @ 08:07  SGPT --  Albumin --   Alk Phos --   Anion Gap 14   SGOT --   Total Bili --   BUN 73   Calcium Total 9.6   CO2 25   Chloride 94   Creatinine 7.60   eGFR if AA --   eGFR if non AA --   Glucose 269   Potassium 4.4   Protein --   Sodium 133      Thyroid Function Tests:      Diabetes Tests:       Radiology:

## 2022-04-13 NOTE — PROGRESS NOTE ADULT - PROBLEM SELECTOR PLAN 7
Admitted  to telemetry unit for monitoring , send 3 sets of cardiac enzymes to rule out acute coronary event, obtain ECHO to evaluate LVEF, cardiology consult  ,continue current management, O2 supply, anticoagulation plan as per cardiology consult

## 2022-04-13 NOTE — PROGRESS NOTE ADULT - ASSESSMENT
esrd- admitted with sob- missed dialysis x2  hyperkalemia- improved  chf  k 4.8  ashd- s/p coronary stent  s/p cabg  hypertension  dyslipidemia  pvd- s/p bypass left leg  dialysis as per renal asap- discussed with pcp and renal  spoke to  4/5  to have hd again  had paf - started on iv cardizem  started on eliquis for paf risks and benefits discussed with pt and   pt is in rsr with cardizem and lopressor  4/8  pt remains rsr -cardiac status stable for discharge  4/13

## 2022-04-13 NOTE — CONSULT NOTE ADULT - PROBLEM SELECTOR RECOMMENDATION 9
change lantus 15 units qam  add admelog 3 units 3x/day before meals  + low dose admelog corrective scale coverage qac/qhs  to resume tresiba 15 units qam + fiasp 3-5 units 3x/day before meals upon d/c
Pt seen and evaluated  - Labs, x-rays reviewed  - No clinical signs of infection noted to the 1st & 2nd digit, gangrenous changes noted. 2cc of purulence expressed from Left ankle wound. No heel wound noted at this time.  - WBC 21.36, please look for other sources of infection. Elevated WBC is likely not from the foot wounds  - All wounds dressed with DSD  - Continue IV abx per ID, zosyn  - Recommend vascular consult for possible PAD    Podiatry to follow

## 2022-04-13 NOTE — PROGRESS NOTE ADULT - REASON FOR ADMISSION

## 2022-04-13 NOTE — PROGRESS NOTE ADULT - PROBLEM SELECTOR PLAN 1
MRI showed evidence of acute osteomyelitis of L medial malleolus and distal aspect of R 1st toe ,poa .   -ID recommended  cefazolin 2g-2g-3g with HD x 4 weeks  -upon discharge, recommend weekly CBC with diff, CMP, ESR and CRP  -wound care  -can follow up with me at [facility] prior to finishing antibiotic course

## 2022-04-15 PROBLEM — N18.9 CHRONIC KIDNEY DISEASE, UNSPECIFIED: Chronic | Status: ACTIVE | Noted: 2022-04-05

## 2022-04-17 ENCOUNTER — NON-APPOINTMENT (OUTPATIENT)
Age: 72
End: 2022-04-17

## 2022-04-18 ENCOUNTER — APPOINTMENT (OUTPATIENT)
Dept: WOUND CARE | Facility: HOSPITAL | Age: 72
End: 2022-04-18
Payer: MEDICARE

## 2022-04-18 ENCOUNTER — OUTPATIENT (OUTPATIENT)
Dept: OUTPATIENT SERVICES | Facility: HOSPITAL | Age: 72
LOS: 1 days | Discharge: HOME | End: 2022-04-18
Payer: MEDICARE

## 2022-04-18 VITALS
WEIGHT: 140 LBS | RESPIRATION RATE: 20 BRPM | HEIGHT: 69 IN | TEMPERATURE: 98.7 F | SYSTOLIC BLOOD PRESSURE: 114 MMHG | HEART RATE: 58 BPM | DIASTOLIC BLOOD PRESSURE: 53 MMHG | OXYGEN SATURATION: 99 % | BODY MASS INDEX: 20.73 KG/M2

## 2022-04-18 DIAGNOSIS — E11.622 TYPE 2 DIABETES MELLITUS WITH OTHER SKIN ULCER: ICD-10-CM

## 2022-04-18 PROCEDURE — G0463: CPT

## 2022-04-18 PROCEDURE — 99213 OFFICE O/P EST LOW 20 MIN: CPT

## 2022-04-18 NOTE — ASSESSMENT
[Verbal] : Verbal [Written] : Written [Demo] : Demo [Patient] : Patient [Fair - mild discomfort, physical impairment, low acceptance] : Fair - mild discomfort, physical impairment, low acceptance [Verbalizes knowledge/Understanding] : Verbalizes knowledge/understanding [Dressing changes] : dressing changes [Foot Care] : foot care [Pressure relief] : pressure relief [Signs and symptoms of infection] : sign and symptoms of infection [Nutrition] : nutrition [How and When to Call] : how and when to call [Labs and Tests] : labs and tests [Hyperbaric Therapy] : hyperbaric therapy [Patient responsibility to plan of care] : patient responsibility to plan of care [Stable] : stable [Cane] : Cane [Home] : Home [] : No [FreeTextEntry2] : Promote Skin Integrity \par S/S of Infection \par Nutrition and Wound Healing \par Vascular Studies \par Vascular Consult \par Bloodwork \par Dressing Changes \par HBO Therapy \par  [FreeTextEntry3] : Wounds have remained the same size from last visit.  [FreeTextEntry4] : MD ordered vascular studies to be completed - authorization submitted today. \par Vascular Consult submitted today. \par MD reviewed MRI results with patient during today's visit. \par Patient states she has still not completed B/W for HBO pre testing. Importance of completing B/W emphasized to the patient, and delay of B/W is delaying HBOT Authorization. Pt states she will complete B/W "sometime this week."\par ** TM to be completed once HBOT is approved, COVID PCR to be completed **\par F/U Podiatry 1 week.

## 2022-04-18 NOTE — REVIEW OF SYSTEMS
[Joint Stiffness] : joint stiffness [Skin Wound] : skin wound [Fever] : no fever [Chills] : no chills [Eye Pain] : no eye pain [Loss Of Hearing] : no hearing loss [Shortness Of Breath] : no shortness of breath [Abdominal Pain] : no abdominal pain [Anxiety] : no anxiety [Easy Bleeding] : no tendency for easy bleeding [FreeTextEntry5] : HTN, HLD , PVD ( patient has had previous vascular interventions ) severe diabetic small vessel disease  [FreeTextEntry6] : Hx of long term smoking  [de-identified] : DFU 3 medial left ankle , probes to bone , DFU 2 distal hallux and 2nd toe skin, subcu , fat  [de-identified] : IDDM with neuropathy  [de-identified] : PJ

## 2022-04-18 NOTE — HISTORY OF PRESENT ILLNESS
[FreeTextEntry1] : SHILO KOHLER is being seen for a initial nursing assessment visit. Pt reports to the [facility] with wounds on her right great toe, right foot 2nd digit, and a left medial ankle wound that Pt had since, "November 2021". Pt states she is a diabetic and gets "sores" on her feet. Pt's diabetes is monitored by Dr. Pauly Kirk and does not follow up with Endocrinologist "often". Patient accompanied by spouse\par \par 4/18/22 ulcers stable , MRI did not show osteomyeltis but shows vascular calcification in right foot

## 2022-04-18 NOTE — PLAN
[FreeTextEntry1] :  consult with Dr Corley \par arterial vascular studies ordered\par non palpable Rt DP or PT pulses\par AgAlginate 3X/week to all ulcers\par return 1 week to see podiatry\par Spent 24 minutes for patient care and medical decision making.\par

## 2022-04-18 NOTE — VITALS
[Pain related to present condition?] : The patient's  pain is related to present condition. [Sharp] : sharp [] : No [de-identified] : Pt states 3/10 pain at present.  [FreeTextEntry3] : Right Great Toe  [FreeTextEntry1] : Rest  [FreeTextEntry2] : Walking, Pressure  [FreeTextEntry4] : Pt educated to offload the right foot, and elevate the legs when sitting for greater than 20 minutes.  [FreeTextEntry5] : Pt states she has had some medication changes, but is unsure of the names. She was instructed to bring in the medication name to next visit to be added to the chart.

## 2022-04-18 NOTE — PHYSICAL EXAM
[0] : left 0 [Skin Ulcer] : ulcer [Alert] : alert [Oriented to Person] : oriented to person [Oriented to Place] : oriented to place [Calm] : calm [2 x 2] : 2 x 2  [4 x 4] : 4 x 4  [Varicose Veins Of Lower Extremities] : not present [Purpura] : no purpura  [Petechiae] : no petechiae [de-identified] : calm [de-identified] : hammer toes  [de-identified] : DFU 3 medial left ankle and DFU 2 , hallux and 2nd toe  [FreeTextEntry1] : Right Distal Hallux  [FreeTextEntry2] : 0.5 [FreeTextEntry3] : 1.5 [FreeTextEntry4] : 0.1 [de-identified] : serosanguineous [de-identified] : callus  [de-identified] : 20% [de-identified] : Silver Alginate  [de-identified] : Mechanically Cleansed with Normal Saline  [FreeTextEntry7] : Right Foot 2nd Digit  [FreeTextEntry8] : 0.6 [FreeTextEntry9] : 0.6 [de-identified] : 0.2 [de-identified] : serosanguineous [de-identified] : callus  [de-identified] : 50% [de-identified] : 50% [de-identified] : Silver Alginate  [de-identified] : Mechanically Cleansed with Normal Saline  [de-identified] : Left Medial Ankle  [de-identified] : 0.9 [de-identified] : 1.3 [de-identified] : 0.2 [de-identified] : 100% [de-identified] : Silver Alginate  [de-identified] : Mechanically Cleansed with Normal Saline  [TWNoteComboBox4] : Small [TWNoteComboBox5] : No [TWNoteComboBox6] : Diabetic [de-identified] : No [de-identified] : other [de-identified] : None [de-identified] : <20% [de-identified] : 50% [de-identified] : Yes [de-identified] : 3x Weekly [de-identified] : Primary Dressing [de-identified] : Small [de-identified] : No [de-identified] : Diabetic [de-identified] : No [de-identified] : other [de-identified] : None [de-identified] : 50% [de-identified] : Yes [de-identified] : 3x Weekly [de-identified] : Primary Dressing [de-identified] : Small [de-identified] : No [de-identified] : Diabetic [de-identified] : No [de-identified] : None [de-identified] : None [de-identified] : None [de-identified] : Yes [de-identified] : 3x Weekly [de-identified] : Primary Dressing

## 2022-04-19 DIAGNOSIS — N19 UNSPECIFIED KIDNEY FAILURE: ICD-10-CM

## 2022-04-19 DIAGNOSIS — E11.621 TYPE 2 DIABETES MELLITUS WITH FOOT ULCER: ICD-10-CM

## 2022-04-19 DIAGNOSIS — E11.40 TYPE 2 DIABETES MELLITUS WITH DIABETIC NEUROPATHY, UNSPECIFIED: ICD-10-CM

## 2022-04-19 DIAGNOSIS — L97.512 NON-PRESSURE CHRONIC ULCER OF OTHER PART OF RIGHT FOOT WITH FAT LAYER EXPOSED: ICD-10-CM

## 2022-04-19 DIAGNOSIS — L97.324 NON-PRESSURE CHRONIC ULCER OF LEFT ANKLE WITH NECROSIS OF BONE: ICD-10-CM

## 2022-04-19 DIAGNOSIS — E11.51 TYPE 2 DIABETES MELLITUS WITH DIABETIC PERIPHERAL ANGIOPATHY WITHOUT GANGRENE: ICD-10-CM

## 2022-04-19 DIAGNOSIS — Z79.899 OTHER LONG TERM (CURRENT) DRUG THERAPY: ICD-10-CM

## 2022-04-19 DIAGNOSIS — Z95.828 PRESENCE OF OTHER VASCULAR IMPLANTS AND GRAFTS: ICD-10-CM

## 2022-04-19 DIAGNOSIS — E11.622 TYPE 2 DIABETES MELLITUS WITH OTHER SKIN ULCER: ICD-10-CM

## 2022-04-19 DIAGNOSIS — Z82.49 FAMILY HISTORY OF ISCHEMIC HEART DISEASE AND OTHER DISEASES OF THE CIRCULATORY SYSTEM: ICD-10-CM

## 2022-04-19 DIAGNOSIS — Z82.0 FAMILY HISTORY OF EPILEPSY AND OTHER DISEASES OF THE NERVOUS SYSTEM: ICD-10-CM

## 2022-04-19 DIAGNOSIS — Z87.891 PERSONAL HISTORY OF NICOTINE DEPENDENCE: ICD-10-CM

## 2022-04-19 DIAGNOSIS — Z91.040 LATEX ALLERGY STATUS: ICD-10-CM

## 2022-04-19 DIAGNOSIS — I10 ESSENTIAL (PRIMARY) HYPERTENSION: ICD-10-CM

## 2022-04-19 DIAGNOSIS — Z95.5 PRESENCE OF CORONARY ANGIOPLASTY IMPLANT AND GRAFT: ICD-10-CM

## 2022-04-22 ENCOUNTER — INPATIENT (INPATIENT)
Facility: HOSPITAL | Age: 72
LOS: 9 days | Discharge: HOME | DRG: 682 | End: 2022-05-02
Attending: INTERNAL MEDICINE | Admitting: INTERNAL MEDICINE
Payer: MEDICARE

## 2022-04-22 VITALS
TEMPERATURE: 98 F | DIASTOLIC BLOOD PRESSURE: 59 MMHG | OXYGEN SATURATION: 94 % | SYSTOLIC BLOOD PRESSURE: 132 MMHG | WEIGHT: 134.92 LBS | HEIGHT: 69 IN | RESPIRATION RATE: 16 BRPM

## 2022-04-22 DIAGNOSIS — Z91.19 PATIENT'S NONCOMPLIANCE WITH OTHER MEDICAL TREATMENT AND REGIMEN: ICD-10-CM

## 2022-04-22 DIAGNOSIS — I48.91 UNSPECIFIED ATRIAL FIBRILLATION: ICD-10-CM
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
## 2022-04-22 DIAGNOSIS — M86.10 OTHER ACUTE OSTEOMYELITIS, UNSPECIFIED SITE: ICD-10-CM

## 2022-04-22 DIAGNOSIS — E11.9 TYPE 2 DIABETES MELLITUS WITHOUT COMPLICATIONS: ICD-10-CM

## 2022-04-22 DIAGNOSIS — N18.6 END STAGE RENAL DISEASE: ICD-10-CM

## 2022-04-22 DIAGNOSIS — Z29.9 ENCOUNTER FOR PROPHYLACTIC MEASURES, UNSPECIFIED: ICD-10-CM

## 2022-04-22 DIAGNOSIS — Z78.9 OTHER SPECIFIED HEALTH STATUS: ICD-10-CM

## 2022-04-22 DIAGNOSIS — I73.9 PERIPHERAL VASCULAR DISEASE, UNSPECIFIED: ICD-10-CM

## 2022-04-22 DIAGNOSIS — N18.9 CHRONIC KIDNEY DISEASE, UNSPECIFIED: ICD-10-CM

## 2022-04-22 LAB
ALBUMIN SERPL ELPH-MCNC: 2.7 G/DL — LOW (ref 3.3–5)
ALP SERPL-CCNC: 92 U/L — SIGNIFICANT CHANGE UP (ref 40–120)
ALT FLD-CCNC: <6 U/L — LOW (ref 12–78)
ANION GAP SERPL CALC-SCNC: 12 MMOL/L — SIGNIFICANT CHANGE UP (ref 5–17)
AST SERPL-CCNC: 24 U/L — SIGNIFICANT CHANGE UP (ref 15–37)
BASOPHILS # BLD AUTO: 0.1 K/UL — SIGNIFICANT CHANGE UP (ref 0–0.2)
BASOPHILS NFR BLD AUTO: 0.7 % — SIGNIFICANT CHANGE UP (ref 0–2)
BILIRUB SERPL-MCNC: 0.3 MG/DL — SIGNIFICANT CHANGE UP (ref 0.2–1.2)
BUN SERPL-MCNC: 57 MG/DL — HIGH (ref 7–23)
CALCIUM SERPL-MCNC: 9.4 MG/DL — SIGNIFICANT CHANGE UP (ref 8.5–10.1)
CHLORIDE SERPL-SCNC: 95 MMOL/L — LOW (ref 96–108)
CO2 SERPL-SCNC: 27 MMOL/L — SIGNIFICANT CHANGE UP (ref 22–31)
CREAT SERPL-MCNC: 7.6 MG/DL — HIGH (ref 0.5–1.3)
EGFR: 5 ML/MIN/1.73M2 — LOW
EOSINOPHIL # BLD AUTO: 0.23 K/UL — SIGNIFICANT CHANGE UP (ref 0–0.5)
EOSINOPHIL NFR BLD AUTO: 1.6 % — SIGNIFICANT CHANGE UP (ref 0–6)
GLUCOSE SERPL-MCNC: 128 MG/DL — HIGH (ref 70–99)
HCT VFR BLD CALC: 29.3 % — LOW (ref 34.5–45)
HGB BLD-MCNC: 9.5 G/DL — LOW (ref 11.5–15.5)
IMM GRANULOCYTES NFR BLD AUTO: 1 % — SIGNIFICANT CHANGE UP (ref 0–1.5)
LACTATE SERPL-SCNC: 0.6 MMOL/L — LOW (ref 0.7–2)
LIDOCAIN IGE QN: 58 U/L — LOW (ref 73–393)
LYMPHOCYTES # BLD AUTO: 0.66 K/UL — LOW (ref 1–3.3)
LYMPHOCYTES # BLD AUTO: 4.6 % — LOW (ref 13–44)
MCHC RBC-ENTMCNC: 31.6 PG — SIGNIFICANT CHANGE UP (ref 27–34)
MCHC RBC-ENTMCNC: 32.4 GM/DL — SIGNIFICANT CHANGE UP (ref 32–36)
MCV RBC AUTO: 97.3 FL — SIGNIFICANT CHANGE UP (ref 80–100)
MONOCYTES # BLD AUTO: 1.33 K/UL — HIGH (ref 0–0.9)
MONOCYTES NFR BLD AUTO: 9.2 % — SIGNIFICANT CHANGE UP (ref 2–14)
NEUTROPHILS # BLD AUTO: 12.02 K/UL — HIGH (ref 1.8–7.4)
NEUTROPHILS NFR BLD AUTO: 82.9 % — HIGH (ref 43–77)
NRBC # BLD: 0 /100 WBCS — SIGNIFICANT CHANGE UP (ref 0–0)
PLATELET # BLD AUTO: 428 K/UL — HIGH (ref 150–400)
POTASSIUM SERPL-MCNC: 5.3 MMOL/L — SIGNIFICANT CHANGE UP (ref 3.5–5.3)
POTASSIUM SERPL-SCNC: 5.3 MMOL/L — SIGNIFICANT CHANGE UP (ref 3.5–5.3)
PROT SERPL-MCNC: 7 G/DL — SIGNIFICANT CHANGE UP (ref 6–8.3)
RBC # BLD: 3.01 M/UL — LOW (ref 3.8–5.2)
RBC # FLD: 17.9 % — HIGH (ref 10.3–14.5)
SARS-COV-2 RNA SPEC QL NAA+PROBE: SIGNIFICANT CHANGE UP
SODIUM SERPL-SCNC: 134 MMOL/L — LOW (ref 135–145)
WBC # BLD: 14.49 K/UL — HIGH (ref 3.8–10.5)
WBC # FLD AUTO: 14.49 K/UL — HIGH (ref 3.8–10.5)

## 2022-04-22 PROCEDURE — 99285 EMERGENCY DEPT VISIT HI MDM: CPT

## 2022-04-22 PROCEDURE — 99221 1ST HOSP IP/OBS SF/LOW 40: CPT

## 2022-04-22 PROCEDURE — 70450 CT HEAD/BRAIN W/O DYE: CPT | Mod: 26

## 2022-04-22 PROCEDURE — 71045 X-RAY EXAM CHEST 1 VIEW: CPT | Mod: 26

## 2022-04-22 PROCEDURE — 73630 X-RAY EXAM OF FOOT: CPT | Mod: 26,LT

## 2022-04-22 PROCEDURE — 73620 X-RAY EXAM OF FOOT: CPT | Mod: 26,RT

## 2022-04-22 PROCEDURE — 72170 X-RAY EXAM OF PELVIS: CPT | Mod: 26

## 2022-04-22 PROCEDURE — 73610 X-RAY EXAM OF ANKLE: CPT | Mod: 26,LT

## 2022-04-22 RX ORDER — DILTIAZEM HCL 120 MG
60 CAPSULE, EXT RELEASE 24 HR ORAL EVERY 8 HOURS
Refills: 0 | Status: DISCONTINUED | OUTPATIENT
Start: 2022-04-22 | End: 2022-05-02

## 2022-04-22 RX ORDER — INSULIN LISPRO 100/ML
VIAL (ML) SUBCUTANEOUS AT BEDTIME
Refills: 0 | Status: DISCONTINUED | OUTPATIENT
Start: 2022-04-22 | End: 2022-05-02

## 2022-04-22 RX ORDER — DEXTROSE 50 % IN WATER 50 %
12.5 SYRINGE (ML) INTRAVENOUS ONCE
Refills: 0 | Status: DISCONTINUED | OUTPATIENT
Start: 2022-04-22 | End: 2022-05-02

## 2022-04-22 RX ORDER — DEXTROSE 50 % IN WATER 50 %
25 SYRINGE (ML) INTRAVENOUS ONCE
Refills: 0 | Status: DISCONTINUED | OUTPATIENT
Start: 2022-04-22 | End: 2022-05-02

## 2022-04-22 RX ORDER — PANTOPRAZOLE SODIUM 20 MG/1
40 TABLET, DELAYED RELEASE ORAL
Refills: 0 | Status: DISCONTINUED | OUTPATIENT
Start: 2022-04-22 | End: 2022-05-02

## 2022-04-22 RX ORDER — CEFAZOLIN SODIUM 1 G
1000 VIAL (EA) INJECTION EVERY 24 HOURS
Refills: 0 | Status: DISCONTINUED | OUTPATIENT
Start: 2022-04-22 | End: 2022-05-02

## 2022-04-22 RX ORDER — LACTOBACILLUS ACIDOPHILUS 100MM CELL
1 CAPSULE ORAL
Refills: 0 | Status: DISCONTINUED | OUTPATIENT
Start: 2022-04-22 | End: 2022-05-02

## 2022-04-22 RX ORDER — GLUCAGON INJECTION, SOLUTION 0.5 MG/.1ML
1 INJECTION, SOLUTION SUBCUTANEOUS ONCE
Refills: 0 | Status: DISCONTINUED | OUTPATIENT
Start: 2022-04-22 | End: 2022-05-02

## 2022-04-22 RX ORDER — METOPROLOL TARTRATE 50 MG
100 TABLET ORAL EVERY 12 HOURS
Refills: 0 | Status: DISCONTINUED | OUTPATIENT
Start: 2022-04-22 | End: 2022-05-02

## 2022-04-22 RX ORDER — HEPARIN SODIUM 5000 [USP'U]/ML
5000 INJECTION INTRAVENOUS; SUBCUTANEOUS EVERY 12 HOURS
Refills: 0 | Status: DISCONTINUED | OUTPATIENT
Start: 2022-04-22 | End: 2022-05-02

## 2022-04-22 RX ORDER — INSULIN LISPRO 100/ML
VIAL (ML) SUBCUTANEOUS
Refills: 0 | Status: DISCONTINUED | OUTPATIENT
Start: 2022-04-22 | End: 2022-04-25

## 2022-04-22 RX ORDER — SENNA PLUS 8.6 MG/1
2 TABLET ORAL AT BEDTIME
Refills: 0 | Status: DISCONTINUED | OUTPATIENT
Start: 2022-04-22 | End: 2022-05-02

## 2022-04-22 RX ORDER — ACETAMINOPHEN 500 MG
650 TABLET ORAL EVERY 6 HOURS
Refills: 0 | Status: DISCONTINUED | OUTPATIENT
Start: 2022-04-22 | End: 2022-05-02

## 2022-04-22 RX ORDER — DEXTROSE 50 % IN WATER 50 %
15 SYRINGE (ML) INTRAVENOUS ONCE
Refills: 0 | Status: DISCONTINUED | OUTPATIENT
Start: 2022-04-22 | End: 2022-05-02

## 2022-04-22 RX ORDER — TRAMADOL HYDROCHLORIDE 50 MG/1
50 TABLET ORAL THREE TIMES A DAY
Refills: 0 | Status: DISCONTINUED | OUTPATIENT
Start: 2022-04-22 | End: 2022-04-29

## 2022-04-22 RX ORDER — ASPIRIN/CALCIUM CARB/MAGNESIUM 324 MG
81 TABLET ORAL DAILY
Refills: 0 | Status: DISCONTINUED | OUTPATIENT
Start: 2022-04-22 | End: 2022-04-22

## 2022-04-22 RX ORDER — SODIUM CHLORIDE 9 MG/ML
1000 INJECTION, SOLUTION INTRAVENOUS
Refills: 0 | Status: DISCONTINUED | OUTPATIENT
Start: 2022-04-22 | End: 2022-05-02

## 2022-04-22 RX ORDER — ONDANSETRON 8 MG/1
4 TABLET, FILM COATED ORAL EVERY 8 HOURS
Refills: 0 | Status: DISCONTINUED | OUTPATIENT
Start: 2022-04-22 | End: 2022-05-02

## 2022-04-22 RX ORDER — CLOPIDOGREL BISULFATE 75 MG/1
75 TABLET, FILM COATED ORAL DAILY
Refills: 0 | Status: DISCONTINUED | OUTPATIENT
Start: 2022-04-22 | End: 2022-05-02

## 2022-04-22 RX ORDER — LANOLIN ALCOHOL/MO/W.PET/CERES
3 CREAM (GRAM) TOPICAL AT BEDTIME
Refills: 0 | Status: DISCONTINUED | OUTPATIENT
Start: 2022-04-22 | End: 2022-05-02

## 2022-04-22 RX ORDER — ATORVASTATIN CALCIUM 80 MG/1
40 TABLET, FILM COATED ORAL AT BEDTIME
Refills: 0 | Status: DISCONTINUED | OUTPATIENT
Start: 2022-04-22 | End: 2022-05-02

## 2022-04-22 RX ORDER — CALCIUM ACETATE 667 MG
667 TABLET ORAL
Refills: 0 | Status: DISCONTINUED | OUTPATIENT
Start: 2022-04-22 | End: 2022-05-02

## 2022-04-22 RX ADMIN — ATORVASTATIN CALCIUM 40 MILLIGRAM(S): 80 TABLET, FILM COATED ORAL at 22:57

## 2022-04-22 RX ADMIN — HEPARIN SODIUM 5000 UNIT(S): 5000 INJECTION INTRAVENOUS; SUBCUTANEOUS at 22:57

## 2022-04-22 RX ADMIN — Medication 3 MILLIGRAM(S): at 23:01

## 2022-04-22 RX ADMIN — Medication 1 TABLET(S): at 19:47

## 2022-04-22 RX ADMIN — Medication 60 MILLIGRAM(S): at 22:57

## 2022-04-22 RX ADMIN — Medication 1: at 18:00

## 2022-04-22 RX ADMIN — Medication 100 MILLIGRAM(S): at 19:48

## 2022-04-22 RX ADMIN — Medication 667 MILLIGRAM(S): at 19:47

## 2022-04-22 RX ADMIN — SENNA PLUS 2 TABLET(S): 8.6 TABLET ORAL at 22:57

## 2022-04-22 NOTE — CONSULT NOTE ADULT - ASSESSMENT
s/ p falls  esrd - on hd  missed hd- - admitted for dialysis  ashd s/p coronary stent  s/p cabg  chf  dm2  dyslipidemia  pvd- s/p  surgery

## 2022-04-22 NOTE — CONSULT NOTE ADULT - ASSESSMENT
Patient came to ED because she  fell today on her way to dialysis, pt states that she is falling everyday and always feels weak. pt poor historian, missed dialysis today, will need admission for placement. Recent admission 04/05/22 -70yo female bib ems with sob, pt states she has missed the last 2 rounds of dialysis and is due today, and has been having worsening sob, no cough, fever, chills, no chest pain no other complaints .Found to have hyperkalemia Diagnosed with foot infection MRI showed evidence of acute osteomyelitis of L medial malleolus and distal aspect of R 1st toe ,poa . -ID recommended  cefazolin 2g-2g-3g with HD x 4 weeks .Patient was discharged home with home care and iv abx at HD center Palliative care consult requested ,to discuss advance directives and complete MOLST  (22 Apr 2022 16:22)        esrd tues thurs sat   Excess fluids and waste products will be removed from your blood; your electrolytes will be balanced; your blood pressure will be controlled.      ANEMIA PLAN:  Anemia of chronic disease:  Well controlled by Aranesp  H and H subtherapeutic .  We will continue Aranesp aiming for a HCT of 32-36 %.   We will monitor Iron stores, B12 and RBC folate .    BP monitoring,continue current antihypertensive meds, low salt diet,followup with PMD in 1-2 weeks

## 2022-04-22 NOTE — CONSULT NOTE ADULT - SUBJECTIVE AND OBJECTIVE BOX
Date/Time Patient Seen:  		  Referring MD:   Data Reviewed	       Patient is a 71y old  Female who presents with a chief complaint of falls ,weakness (22 Apr 2022 17:41)      Subjective/HPI   History of Present Illness:  Reason for Admission: falls ,weakness  History of Present Illness:   Patient came to ED because she  fell today on her way to dialysis, pt states that she is falling everyday and always feels weak. pt poor historian, missed dialysis today, will need admission for placement. Recent admission 04/05/22 -70yo female bib ems with sob, pt states she has missed the last 2 rounds of dialysis and is due today, and has been having worsening sob, no cough, fever, chills, no chest pain no other complaints .Found to have hyperkalemia Diagnosed with foot infection MRI showed evidence of acute osteomyelitis of L medial malleolus and distal aspect of R 1st toe ,poa . -ID recommended  cefazolin 2g-2g-3g with HD x 4 weeks .Patient was discharged home with home care and iv abx at HD center Palliative care consult requested ,to discuss advance directives and complete MOLST   PAST MEDICAL & SURGICAL HISTORY:  Diabetes mellitus II    HTN (hypertension)    h/o Anxiety attack    Depression    h/o Myocardial infarct 2007    CAD (coronary artery disease)    CAD (coronary artery disease)    h/o Hepatitis A 1969  currently resolved, no symptoms    PAD (peripheral artery disease)    Murmur, cardiac    h/o Smoking  quitted 3/2012    CRF (chronic renal failure), unspecified stage    Dialysis patient    Anemia secondary to renal failure    HTN (hypertension)    coronary stent 2007    s/p Ovarian cyst removal    s/p surgical removal of benign Skin lesion epigastric area    PAST SURGICAL HISTORY:  coronary stent 2007     s/p Ovarian cyst removal     s/p surgical removal of benign Skin lesion epigastric area.     Social History:  Social History (marital status, living situation, occupation, tobacco use, alcohol and drug use, and sexual history): no etoh or tobacco reported     Tobacco Screening:  · Core Measure Site	Yes  · Has the patient used tobacco in the past 30 days?	No    Risk Assessment:    Present on Admission:  Deep Venous Thrombosis	no  Pulmonary Embolus	no     Heart Failure:  Does this patient have a history of or has been diagnosed with heart failure? yes.     LV Function Assessment (LVS function was evaluated before arrival and/or during hospitalization) yes.     Is the Ejection Fraction >40% ? yes.     mildly reduced LV function.     Ejection Fraction 45 %.      Medication list         MEDICATIONS  (STANDING):  atorvastatin 40 milliGRAM(s) Oral at bedtime  calcium acetate 667 milliGRAM(s) Oral three times a day with meals  ceFAZolin   IVPB 1000 milliGRAM(s) IV Intermittent every 24 hours  cloNIDine 0.1 milliGRAM(s) Oral every 12 hours  clopidogrel Tablet 75 milliGRAM(s) Oral daily  dextrose 5%. 1000 milliLiter(s) (100 mL/Hr) IV Continuous <Continuous>  dextrose 5%. 1000 milliLiter(s) (50 mL/Hr) IV Continuous <Continuous>  dextrose 50% Injectable 25 Gram(s) IV Push once  dextrose 50% Injectable 12.5 Gram(s) IV Push once  dextrose 50% Injectable 25 Gram(s) IV Push once  diltiazem    Tablet 60 milliGRAM(s) Oral every 8 hours  glucagon  Injectable 1 milliGRAM(s) IntraMuscular once  heparin   Injectable 5000 Unit(s) SubCutaneous every 12 hours  imipramine 50 milliGRAM(s) Oral daily  insulin lispro (ADMELOG) corrective regimen sliding scale   SubCutaneous three times a day before meals  insulin lispro (ADMELOG) corrective regimen sliding scale   SubCutaneous at bedtime  lactobacillus acidophilus 1 Tablet(s) Oral two times a day  metoprolol tartrate 100 milliGRAM(s) Oral every 12 hours  multivitamin 1 Tablet(s) Oral daily  pantoprazole    Tablet 40 milliGRAM(s) Oral before breakfast  senna 2 Tablet(s) Oral at bedtime    MEDICATIONS  (PRN):  acetaminophen     Tablet .. 650 milliGRAM(s) Oral every 6 hours PRN Temp greater or equal to 38C (100.4F), Mild Pain (1 - 3)  aluminum hydroxide/magnesium hydroxide/simethicone Suspension 30 milliLiter(s) Oral every 4 hours PRN Dyspepsia  dextrose Oral Gel 15 Gram(s) Oral once PRN Blood Glucose LESS THAN 70 milliGRAM(s)/deciliter  melatonin 3 milliGRAM(s) Oral at bedtime PRN Insomnia  ondansetron Injectable 4 milliGRAM(s) IV Push every 8 hours PRN Nausea and/or Vomiting  traMADol 50 milliGRAM(s) Oral three times a day PRN Moderate Pain (4 - 6)         Vitals log        ICU Vital Signs Last 24 Hrs  T(C): 36.7 (22 Apr 2022 19:23), Max: 36.7 (22 Apr 2022 19:23)  T(F): 98 (22 Apr 2022 19:23), Max: 98 (22 Apr 2022 19:23)  HR: 64 (22 Apr 2022 19:23) (58 - 64)  BP: 131/61 (22 Apr 2022 19:23) (131/61 - 136/63)  BP(mean): --  ABP: --  ABP(mean): --  RR: 18 (22 Apr 2022 19:23) (16 - 18)  SpO2: 100% (22 Apr 2022 19:23) (94% - 100%)           Input and Output:  I&O's Detail      Lab Data                        9.5    14.49 )-----------( 428      ( 22 Apr 2022 12:34 )             29.3     04-22    134<L>  |  95<L>  |  57<H>  ----------------------------<  128<H>  5.3   |  27  |  7.60<H>    Ca    9.4      22 Apr 2022 12:34    TPro  7.0  /  Alb  2.7<L>  /  TBili  0.3  /  DBili  x   /  AST  24  /  ALT  <6<L>  /  AlkPhos  92  04-22            Review of Systems	      Objective     Physical Examination        Pertinent Lab findings & Imaging      Renteria:  NO   Adequate UO     I&O's Detail           Discussed with:     Cultures:	        Radiology      ACC: [id number] EXAM:  CT BRAIN                          PROCEDURE DATE:  04/22/2022          INTERPRETATION:  Clinical Indication: Fall, head trauma    5mm axial sections of the brain were obtained from base to vertex,   without the intravenous administration of contrast material. Coronal and   sagittal computer generated reconstructed views are available.    Comparison made prior CT of 12/12/2019 and demonstrates no significant   change.      The ventricles and sulci are prominent consistent with moderate atrophy.   Small vessel white matter ischemic changes are noted. There is no   hemorrhage, mass, or shift of midline structures. Bone window examination   is unremarkable.    IMPRESSION: Moderate atrophy and small vessel white matter ischemic   changes. No change since 12/12/2019.    --- End of Report ---            JODEE TRAN MD; Attending Radiologist  This document has been electronically signed. Apr 22 2022  3:30PM

## 2022-04-22 NOTE — ED ADULT NURSE NOTE - NSIMPLEMENTINTERV_GEN_ALL_ED
Implemented All Fall Risk Interventions:  [place] to call system. Call bell, personal items and telephone within reach. Instruct patient to call for assistance. Room bathroom lighting operational. Non-slip footwear when patient is off stretcher. Physically safe environment: no spills, clutter or unnecessary equipment. Stretcher in lowest position, wheels locked, appropriate side rails in place. Provide visual cue, wrist band, yellow gown, etc. Monitor gait and stability. Monitor for mental status changes and reorient to person, place, and time. Review medications for side effects contributing to fall risk. Reinforce activity limits and safety measures with patient and family.

## 2022-04-22 NOTE — H&P ADULT - NEGATIVE GENERAL GENITOURINARY SYMPTOMS
Awaiting PA information   no hematuria/no renal colic/no flank pain L/no flank pain R/no urine discoloration/no gas in urine

## 2022-04-22 NOTE — H&P ADULT - HISTORY OF PRESENT ILLNESS
Patient came to ED because she  fell today on her way to dialysis, pt states that she is falling everyday and always feels weak. pt poor historian, missed dialysis today, will need admission for placement. Recent admission 04/05/22 -70yo female bib ems with sob, pt states she has missed the last 2 rounds of dialysis and is due today, and has been having worsening sob, no cough, fever, chills, no chest pain no other complaints .Found to have hyperkalemia Diagnosed with foot infection MRI showed evidence of acute osteomyelitis of L medial malleolus and distal aspect of R 1st toe ,poa . -ID recommended  cefazolin 2g-2g-3g with HD x 4 weeks .Patient was discharged home with home care and iv abx at HD center Palliative care consult requested ,to discuss advance directives and complete MOLST

## 2022-04-22 NOTE — PHYSICAL THERAPY INITIAL EVALUATION ADULT - IMPAIRED TRANSFERS: SIT/STAND, REHAB EVAL
Pt remains on HHFNC at 20 lpm for peep/ps and 30% FiO2.  Tried adjusting flow up to 30-40 lpm a couple times today, but patient has bad panic attacks, so he is only tolerating a flow of 20 currently.  Nebs given as scheduled today, wife here this afternoon which has help relief patients anxiety.  I started the process today with [facility] about getting patient a home NIV machine to help with patients wob, and help prevent respiratory failure.  RT will follow up with [facility] tomorrow about seeing if patient is able to get machine prior to discharge.    Paulino Vale, RT on 10/4/2021 at 4:33 PM     decreased strength

## 2022-04-22 NOTE — H&P ADULT - NEGATIVE OPHTHALMOLOGIC SYMPTOMS
27 no diplopia/no photophobia/no lacrimation L/no lacrimation R/no blurred vision L/no blurred vision R

## 2022-04-22 NOTE — PATIENT PROFILE ADULT - FALL HARM RISK - HARM RISK INTERVENTIONS
Assistance with ambulation/Assistance OOB with selected safe patient handling equipment/Communicate Risk of Fall with Harm to all staff/Discuss with provider need for PT consult/Monitor gait and stability/Reinforce activity limits and safety measures with patient and family/Tailored Fall Risk Interventions/Visual Cue: Yellow wristband and red socks/Bed in lowest position, wheels locked, appropriate side rails in place/Call bell, personal items and telephone in reach/Instruct patient to call for assistance before getting out of bed or chair/Non-slip footwear when patient is out of bed/[place] to call system/Physically safe environment - no spills, clutter or unnecessary equipment/Purposeful Proactive Rounding/Room/bathroom lighting operational, light cord in reach

## 2022-04-22 NOTE — ED PROVIDER NOTE - CLINICAL SUMMARY MEDICAL DECISION MAKING FREE TEXT BOX
pt s/p frequent falls, failed trial of ambulation, will admit for placement pt s/p frequent falls, failed trial of ambulation, will admit for placement and dialysis

## 2022-04-22 NOTE — H&P ADULT - NSHPLABSRESULTS_GEN_ALL_CORE
INTERPRETATION:  Clinical Indication: Fall, head trauma    5mm axial sections of the brain were obtained from base to vertex,   without the intravenous administration of contrast material. Coronal and   sagittal computer generated reconstructed views are available.    Comparison made prior CT of 12/12/2019 and demonstrates no significant   change.      The ventricles and sulci are prominent consistent with moderate atrophy.   Small vessel white matter ischemic changes are noted. There is no   hemorrhage, mass, or shift of midline structures. Bone window examination   is unremarkable.    IMPRESSION: Moderate atrophy and small vessel white matter ischemic   changes. No change since 12/12/2019.ACC: [id number] EXAM:  XR CHEST PORTABLE URGENT 1V                          PROCEDURE DATE:  04/22/2022          INTERPRETATION:  AP semierect chest on April 22, 2022 at 12:30 PM.   Patient has weakness.    Heart magnified by technique.    On April 6of this year there were small to moderate bilateral pleural   effusions which have largely disappeared on this study.    IMPRESSION: Improvement in bilateral pleural effusions.    --- End of Report ---< from: Xray Pelvis AP only (04.22.22 @ 12:27) >        INTERPRETATION:  Pelvis. Patient fell with local trauma.    Arterial calcifications noted. Clips in the groin areas are seen.    There isscattered lumbar degeneration with some loss of disc height at   the right at L3-4.    Mild roughly symmetric hip degeneration. No fracture.    IMPRESSION: No acute finding. Other findings as above.    --- End of Report ---    < end of copied text >    < from: TTE Echo Complete w/o Contrast w/ Doppler (04.08.22 @ 10:58) >      FINDINGS  Left Ventricle: Left ventricle was of normal size, moderate LV systolic   dysfunction EF 45%  Aortic Valve: Aortic sclerosis  Mitral Valve: Moderate mitral regurgitation  Tricuspid Valve: Trace tricuspid regurgitation  Pulmonic Valve: Normal  Left Atrium: Normal  Right Ventricle: Normal  Right Atrium: Normal  Diastolic Function: Normal  Pericardium/Pleura: Normal      IMPRESSION:  Left ventricle was of normal size, moderate LV systolic dysfunction EF 45%  Aortic sclerosis  Moderate mitral regurgitation  Trace tricuspid regurgitation    < end of copied text >      FINDINGS  Left Ventricle: Left ventricle was of normal size, moderate LV systolic   dysfunction EF 45%  Aortic Valve: Aortic sclerosis  Mitral Valve: Moderate mitral regurgitation  Tricuspid Valve: Trace tricuspid regurgitation  Pulmonic Valve: Normal  Left Atrium: Normal  Right Ventricle: Normal  Right Atrium: Normal  Diastolic Function: Normal  Pericardium/Pleura: Normal      IMPRESSION:  Left ventricle was of normal size, moderate LV systolic dysfunction EF 45%  Aortic sclerosis  Moderate mitral regurgitation  Trace tricuspid regurgitation

## 2022-04-22 NOTE — H&P ADULT - TIME BILLING
75minutes spent on this visit, 50% visit time spent in care co-ordination with other attendings and counselling patient ,writing admission orders ( see complete and current orders and order section) ,requesting necessary consults ,informing family about status & plan of care .I have discussed care plan with [facility] wellness/admitting /nursing   department ,outpatient PCP , hospital consultants , ER physician & med staff .

## 2022-04-22 NOTE — ED ADULT NURSE NOTE - OBJECTIVE STATEMENT
Pt presents to the ED via ambulance s/p frequent falls. EKG done, pt placed on cardiac monitor, continuos pulse ox, currently on 2 liters via n/c. Pt has a right dialysis access, + thrill, + bruit.

## 2022-04-22 NOTE — CONSULT NOTE ADULT - SUBJECTIVE AND OBJECTIVE BOX
[facility]  INFECTIOUS DISEASES - [address], [place] [address]  Tel: [phone]     Fax: [phone]  =======================================================    [facility]-[id number]  SHILO KOHLER     CC: Patient is a 71y old  Female who presents with a chief complaint of falls ,weakness (22 Apr 2022 16:47)    HPI:  70yo female with hx of ESRD on HD, who presented after fall. This occured while patient walking at home, and she felt generalized weakness leading to fall. She was recently admitted after missing HD x 2, treated also for possible pneumonia. Also found to have osteomyelitis of L medial malleolus and distal aspect of R 1st toe on MRI. Discharged on cefazolin with HD x 4 weeks.     PAST MEDICAL & SURGICAL HISTORY:  Diabetes mellitus II    HTN (hypertension)    h/o Anxiety attack    Depression    h/o Myocardial infarct 2007    CAD (coronary artery disease)    h/o Hepatitis A 1969  currently resolved, no symptoms    PAD (peripheral artery disease)    Murmur, cardiac    h/o Smoking  quitted 3/2012    CRF (chronic renal failure), unspecified stage    Dialysis patient    Anemia secondary to renal failure    HTN (hypertension)    coronary stent 2007    s/p Ovarian cyst removal    s/p surgical removal of benign Skin lesion epigastric area        Social Hx:     FAMILY HISTORY:      Allergies    latex (Unknown)  No Known Drug Allergies    Intolerances        Antibiotics:  MEDICATIONS  (STANDING):  atorvastatin 40 milliGRAM(s) Oral at bedtime  calcium acetate 667 milliGRAM(s) Oral three times a day with meals  cloNIDine 0.1 milliGRAM(s) Oral every 12 hours  clopidogrel Tablet 75 milliGRAM(s) Oral daily  dextrose 5%. 1000 milliLiter(s) (100 mL/Hr) IV Continuous <Continuous>  dextrose 5%. 1000 milliLiter(s) (50 mL/Hr) IV Continuous <Continuous>  dextrose 50% Injectable 25 Gram(s) IV Push once  dextrose 50% Injectable 12.5 Gram(s) IV Push once  dextrose 50% Injectable 25 Gram(s) IV Push once  diltiazem    Tablet 60 milliGRAM(s) Oral every 8 hours  glucagon  Injectable 1 milliGRAM(s) IntraMuscular once  heparin   Injectable 5000 Unit(s) SubCutaneous every 12 hours  imipramine 50 milliGRAM(s) Oral daily  insulin lispro (ADMELOG) corrective regimen sliding scale   SubCutaneous three times a day before meals  insulin lispro (ADMELOG) corrective regimen sliding scale   SubCutaneous at bedtime  lactobacillus acidophilus 1 Tablet(s) Oral two times a day  metoprolol tartrate 100 milliGRAM(s) Oral every 12 hours  multivitamin 1 Tablet(s) Oral daily  pantoprazole    Tablet 40 milliGRAM(s) Oral before breakfast  senna 2 Tablet(s) Oral at bedtime    MEDICATIONS  (PRN):  acetaminophen     Tablet .. 650 milliGRAM(s) Oral every 6 hours PRN Temp greater or equal to 38C (100.4F), Mild Pain (1 - 3)  aluminum hydroxide/magnesium hydroxide/simethicone Suspension 30 milliLiter(s) Oral every 4 hours PRN Dyspepsia  dextrose Oral Gel 15 Gram(s) Oral once PRN Blood Glucose LESS THAN 70 milliGRAM(s)/deciliter  melatonin 3 milliGRAM(s) Oral at bedtime PRN Insomnia  ondansetron Injectable 4 milliGRAM(s) IV Push every 8 hours PRN Nausea and/or Vomiting  traMADol 50 milliGRAM(s) Oral three times a day PRN Moderate Pain (4 - 6)       REVIEW OF SYSTEMS:  CONSTITUTIONAL:  No Fever or chills  HEENT:  No diplopia or blurred vision.  No sore throat or runny nose.  CARDIOVASCULAR:  No chest pain or SOB.  RESPIRATORY:  No cough, shortness of breath, PND or orthopnea.  GASTROINTESTINAL:  No nausea, vomiting or diarrhea.  GENITOURINARY:  No dysuria, frequency or urgency. No Blood in urine  MUSCULOSKELETAL:  no joint aches, no muscle pain  SKIN:  No change in skin, hair or nails.  NEUROLOGIC:  No paresthesias, fasciculations, seizures or weakness.  PSYCHIATRIC:  No disorder of thought or mood.  ENDOCRINE:  No heat or cold intolerance, polyuria or polydipsia.  HEMATOLOGICAL:  No easy bruising or bleeding.     Physical Exam:  Vital Signs Last 24 Hrs  T(C): 36.4 (22 Apr 2022 10:41), Max: 36.4 (22 Apr 2022 10:41)  T(F): 97.5 (22 Apr 2022 10:41), Max: 97.5 (22 Apr 2022 10:41)  HR: 58 (22 Apr 2022 14:20) (58 - 58)  BP: 136/63 (22 Apr 2022 14:20) (132/59 - 136/63)  BP(mean): --  RR: 16 (22 Apr 2022 14:20) (16 - 16)  SpO2: 96% (22 Apr 2022 14:20) (94% - 96%)  Height (cm): 175.3 (04-22 @ 10:41)  Weight (kg): 61.2 (04-22 @ 10:41)  BMI (kg/m2): 19.9 (04-22 @ 10:41)  BSA (m2): 1.75 (04-22 @ 10:41)  GEN: NAD  HEENT: normocephalic and atraumatic. EOMI. PERRL.    NECK: Supple.  No lymphadenopathy   LUNGS: Clear to auscultation.  HEART: Regular rate and rhythm without murmur.  ABDOMEN: Soft, nontender, and nondistended.  Positive bowel sounds.    : No CVA tenderness  EXTREMITIES: Without any cyanosis, clubbing, rash, lesions or edema.  NEUROLOGIC: grossly intact.  PSYCHIATRIC: Appropriate affect .  SKIN: No ulceration or induration present.    Labs:  04-22    134<L>  |  95<L>  |  57<H>  ----------------------------<  128<H>  5.3   |  27  |  7.60<H>    Ca    9.4      22 Apr 2022 12:34    TPro  7.0  /  Alb  2.7<L>  /  TBili  0.3  /  DBili  x   /  AST  24  /  ALT  <6<L>  /  AlkPhos  92  04-22                          9.5    14.49 )-----------( 428      ( 22 Apr 2022 12:34 )             29.3         LIVER FUNCTIONS - ( 22 Apr 2022 12:34 )  Alb: 2.7 g/dL / Pro: 7.0 g/dL / ALK PHOS: 92 U/L / ALT: <6 U/L / AST: 24 U/L / GGT: x                       COVID-19 PCR: NotDetec (04-22-22 @ 11:17)  COVID-19 PCR: NotDetec (04-11-22 @ 09:00)  COVID-19 PCR: NotDetec (04-05-22 @ 03:44)      RECENT CULTURES:        All imaging and other data have been reviewed.     [facility]  INFECTIOUS DISEASES - [address], [place] [address]  Tel: [phone]     Fax: [phone]  =======================================================    [facility]-[id number]  SHILO KOHLER     CC: Patient is a 71y old  Female who presents with a chief complaint of falls ,weakness (22 Apr 2022 16:47)    HPI:  70yo female with hx of ESRD on HD, who presented after fall. This occured while patient walking at home, and she felt generalized weakness leading to fall. She was recently admitted after missing HD x 2, treated also for possible pneumonia. Also found to have osteomyelitis of L medial malleolus and distal aspect of R 1st toe on MRI. Discharged on cefazolin with HD x 4 weeks. Patient denies any fevers, chills, SOB, nausea or diarrhea. She denies any foot pain.    PAST MEDICAL & SURGICAL HISTORY:  Diabetes mellitus II    HTN (hypertension)    h/o Anxiety attack    Depression    h/o Myocardial infarct 2007    CAD (coronary artery disease)    h/o Hepatitis A 1969  currently resolved, no symptoms    PAD (peripheral artery disease)    Murmur, cardiac    h/o Smoking  quitted 3/2012    CRF (chronic renal failure), unspecified stage    Dialysis patient    Anemia secondary to renal failure    HTN (hypertension)    coronary stent 2007    s/p Ovarian cyst removal    s/p surgical removal of benign Skin lesion epigastric area        Social Hx:     FAMILY HISTORY:      Allergies    latex (Unknown)  No Known Drug Allergies    Intolerances        Antibiotics:  MEDICATIONS  (STANDING):  atorvastatin 40 milliGRAM(s) Oral at bedtime  calcium acetate 667 milliGRAM(s) Oral three times a day with meals  cloNIDine 0.1 milliGRAM(s) Oral every 12 hours  clopidogrel Tablet 75 milliGRAM(s) Oral daily  dextrose 5%. 1000 milliLiter(s) (100 mL/Hr) IV Continuous <Continuous>  dextrose 5%. 1000 milliLiter(s) (50 mL/Hr) IV Continuous <Continuous>  dextrose 50% Injectable 25 Gram(s) IV Push once  dextrose 50% Injectable 12.5 Gram(s) IV Push once  dextrose 50% Injectable 25 Gram(s) IV Push once  diltiazem    Tablet 60 milliGRAM(s) Oral every 8 hours  glucagon  Injectable 1 milliGRAM(s) IntraMuscular once  heparin   Injectable 5000 Unit(s) SubCutaneous every 12 hours  imipramine 50 milliGRAM(s) Oral daily  insulin lispro (ADMELOG) corrective regimen sliding scale   SubCutaneous three times a day before meals  insulin lispro (ADMELOG) corrective regimen sliding scale   SubCutaneous at bedtime  lactobacillus acidophilus 1 Tablet(s) Oral two times a day  metoprolol tartrate 100 milliGRAM(s) Oral every 12 hours  multivitamin 1 Tablet(s) Oral daily  pantoprazole    Tablet 40 milliGRAM(s) Oral before breakfast  senna 2 Tablet(s) Oral at bedtime    MEDICATIONS  (PRN):  acetaminophen     Tablet .. 650 milliGRAM(s) Oral every 6 hours PRN Temp greater or equal to 38C (100.4F), Mild Pain (1 - 3)  aluminum hydroxide/magnesium hydroxide/simethicone Suspension 30 milliLiter(s) Oral every 4 hours PRN Dyspepsia  dextrose Oral Gel 15 Gram(s) Oral once PRN Blood Glucose LESS THAN 70 milliGRAM(s)/deciliter  melatonin 3 milliGRAM(s) Oral at bedtime PRN Insomnia  ondansetron Injectable 4 milliGRAM(s) IV Push every 8 hours PRN Nausea and/or Vomiting  traMADol 50 milliGRAM(s) Oral three times a day PRN Moderate Pain (4 - 6)       REVIEW OF SYSTEMS:  CONSTITUTIONAL:  No Fever or chills  CARDIOVASCULAR:  No chest pain  RESPIRATORY: see history  GASTROINTESTINAL:  No nausea, vomiting, diarrhea. no abdominal pain  GENITOURINARY:  No dysuria  MUSCULOSKELETAL:  no back pain  NEUROLOGIC:  No headache, no dizziness  PSYCHIATRIC:  No disorder of thought or mood.    Physical Exam:  Vital Signs Last 24 Hrs  T(C): 36.4 (22 Apr 2022 10:41), Max: 36.4 (22 Apr 2022 10:41)  T(F): 97.5 (22 Apr 2022 10:41), Max: 97.5 (22 Apr 2022 10:41)  HR: 58 (22 Apr 2022 14:20) (58 - 58)  BP: 136/63 (22 Apr 2022 14:20) (132/59 - 136/63)  BP(mean): --  RR: 16 (22 Apr 2022 14:20) (16 - 16)  SpO2: 96% (22 Apr 2022 14:20) (94% - 96%)  Height (cm): 175.3 (04-22 @ 10:41)  Weight (kg): 61.2 (04-22 @ 10:41)  BMI (kg/m2): 19.9 (04-22 @ 10:41)  BSA (m2): 1.75 (04-22 @ 10:41)  GEN: NAD  HEENT: normocephalic and atraumatic.     NECK: Supple.   LUNGS: Clear to auscultation.  HEART: Regular rate and rhythm  ABDOMEN: Soft, nontender, and nondistended.   EXTREMITIES: No knee swelling, no leg edema. RUE AV fistula  NEUROLOGIC: AOx3  PSYCHIATRIC: Appropriate affect .  SKIN: R 1st and 2nd toe ulcers, no surrounding erythema, warmth or swelling noted, no active drainage  left medial heel ulcer, no surrounding swelling        Labs:  04-22    134<L>  |  95<L>  |  57<H>  ----------------------------<  128<H>  5.3   |  27  |  7.60<H>    Ca    9.4      22 Apr 2022 12:34    TPro  7.0  /  Alb  2.7<L>  /  TBili  0.3  /  DBili  x   /  AST  24  /  ALT  <6<L>  /  AlkPhos  92  04-22                          9.5    14.49 )-----------( 428      ( 22 Apr 2022 12:34 )             29.3         LIVER FUNCTIONS - ( 22 Apr 2022 12:34 )  Alb: 2.7 g/dL / Pro: 7.0 g/dL / ALK PHOS: 92 U/L / ALT: <6 U/L / AST: 24 U/L / GGT: x                       COVID-19 PCR: NotDetec (04-22-22 @ 11:17)  COVID-19 PCR: NotDetec (04-11-22 @ 09:00)  COVID-19 PCR: NotDetec (04-05-22 @ 03:44)      RECENT CULTURES:        All imaging and other data have been reviewed.

## 2022-04-22 NOTE — PATIENT PROFILE ADULT - NSPROPTRIGHTREPNAME_GEN_A__NUR
Caroline is a 22 year old who is being evaluated via a billable telephone visit.      What phone number would you like to be contacted at? [phone]  How would you like to obtain your AVS?     Bibiana Chapman M.A.   Ludy Madsen

## 2022-04-22 NOTE — H&P ADULT - NEGATIVE GASTROINTESTINAL SYMPTOMS
right facial droop no nausea/no vomiting/no diarrhea/no constipation/no change in bowel habits/no flatulence/no abdominal pain

## 2022-04-22 NOTE — PHYSICAL THERAPY INITIAL EVALUATION ADULT - PERTINENT HX OF CURRENT PROBLEM, REHAB EVAL
pt fell today on her way to dialysis, pt states that she is falling everyday and always feels weak. pt poor historian, missed dialysis today, will need admission for placement.

## 2022-04-22 NOTE — CONSULT NOTE ADULT - SUBJECTIVE AND OBJECTIVE BOX
Patient is a 71y Female whom presented to the hospital with esrd on hd    PAST MEDICAL & SURGICAL HISTORY:  Diabetes mellitus II    HTN (hypertension)    h/o Anxiety attack    Depression    h/o Myocardial infarct 2007    CAD (coronary artery disease)    h/o Hepatitis A 1969  currently resolved, no symptoms    PAD (peripheral artery disease)    Murmur, cardiac    h/o Smoking  quitted 3/2012    CRF (chronic renal failure), unspecified stage    Dialysis patient    Anemia secondary to renal failure    HTN (hypertension)    coronary stent 2007    s/p Ovarian cyst removal    s/p surgical removal of benign Skin lesion epigastric area        MEDICATIONS  (STANDING):  aspirin enteric coated 81 milliGRAM(s) Oral daily  atorvastatin 40 milliGRAM(s) Oral at bedtime  calcium acetate 667 milliGRAM(s) Oral three times a day with meals  ceFAZolin   IVPB 1000 milliGRAM(s) IV Intermittent every 24 hours  cloNIDine 0.1 milliGRAM(s) Oral every 12 hours  clopidogrel Tablet 75 milliGRAM(s) Oral daily  dextrose 5%. 1000 milliLiter(s) (100 mL/Hr) IV Continuous <Continuous>  dextrose 5%. 1000 milliLiter(s) (50 mL/Hr) IV Continuous <Continuous>  dextrose 50% Injectable 25 Gram(s) IV Push once  dextrose 50% Injectable 12.5 Gram(s) IV Push once  dextrose 50% Injectable 25 Gram(s) IV Push once  diltiazem    Tablet 60 milliGRAM(s) Oral every 8 hours  glucagon  Injectable 1 milliGRAM(s) IntraMuscular once  heparin   Injectable 5000 Unit(s) SubCutaneous every 12 hours  imipramine 50 milliGRAM(s) Oral daily  insulin lispro (ADMELOG) corrective regimen sliding scale   SubCutaneous three times a day before meals  insulin lispro (ADMELOG) corrective regimen sliding scale   SubCutaneous at bedtime  lactobacillus acidophilus 1 Tablet(s) Oral two times a day  metoprolol tartrate 100 milliGRAM(s) Oral every 12 hours  multivitamin 1 Tablet(s) Oral daily  pantoprazole    Tablet 40 milliGRAM(s) Oral before breakfast  senna 2 Tablet(s) Oral at bedtime      Allergies    latex (Unknown)  No Known Drug Allergies    Intolerances        SOCIAL HISTORY:  Denies ETOh,Smoking,     FAMILY HISTORY:      REVIEW OF SYSTEMS:    CONSTITUTIONAL: No weakness, fevers or chills  RESPIRATORY: No cough, wheezing, hemoptysis; No shortness of breath  CARDIOVASCULAR: No chest pain or palpitations  GASTROINTESTINAL: No abdominal or epigastric pain. No nausea, vomiting,     No diarrhea or constipation. No melena   GENITOURINARY: No dysuria, frequency or hematuria  NEUROLOGICAL: No numbness or weakness  SKIN: dry    Vital Signs Last 24 Hrs  T(C): 36.4 (22 Apr 2022 10:41), Max: 36.4 (22 Apr 2022 10:41)  T(F): 97.5 (22 Apr 2022 10:41), Max: 97.5 (22 Apr 2022 10:41)  HR: 58 (22 Apr 2022 14:20) (58 - 58)  BP: 136/63 (22 Apr 2022 14:20) (132/59 - 136/63)  BP(mean): --  RR: 16 (22 Apr 2022 14:20) (16 - 16)  SpO2: 96% (22 Apr 2022 14:20) (94% - 96%)  Height (cm): 175.3 (04-22 @ 10:41)  Weight (kg): 61.2 (04-22 @ 10:41)  BMI (kg/m2): 19.9 (04-22 @ 10:41)  BSA (m2): 1.75 (04-22 @ 10:41)          Labs:  04-22    134<L>  |  95<L>  |  57<H>  ----------------------------<  128<H>  5.3   |  27  |  7.60<H>    Ca    9.4      22 Apr 2022 12:34    TPro  7.0  /  Alb  2.7<L>  /  TBili  0.3  /  DBili  x   /  AST  24  /  ALT  <6<L>  /  AlkPhos  92  04-22                          9.5    14.49 )-----------( 428      ( 22 Apr 2022 12:34 )PHYSICAL EXAM:    Constitutional: NAD  HEENT: conjunctive   clear   Neck:  No JVD  Respiratory: CTAB  Cardiovascular: S1 and S2  Gastrointestinal: BS+, soft, NT/ND  Extremities: No peripheral edema  Neurological: A/O x 3, no focal deficits  Psychiatric: Normal mood, normal affect  : No Renteria  Skin: dry   Access: Not applicable    LABS:                        9.5    11.24 )-----------( 445      ( 23 Apr 2022 09:21 )             30.3     04-23    134<L>  |  92<L>  |  74<H>  ----------------------------<  161<H>  5.4<H>   |  25  |  8.70<H>    Ca    9.3      23 Apr 2022 09:21    TPro  6.8  /  Alb  2.7<L>  /  TBili  0.4  /  DBili  x   /  AST  22  /  ALT  <6<L>  /  AlkPhos  93  04-23      Urine Studies:          RADIOLOGY & ADDITIONAL STUDIES:

## 2022-04-22 NOTE — ED PROVIDER NOTE - PHYSICAL EXAMINATION
Gen: alert, NAD  HEENT:  NC/AT, PERR, no exophthalmos  CV:  well perfused, rrr   Pulm:  normal RR, I/E ratio and chest excursion  Abd: s/nt/nd  MSK: moving all extremities  Neuro:  non-focal  Skin:  visualized areas intact  Psych: AOx2

## 2022-04-22 NOTE — ED ADULT NURSE REASSESSMENT NOTE - NS ED NURSE REASSESS COMMENT FT1
1935: Spoke with Dr. Landeros re: blood pressure medication.  131/61  64  18  100 on 2liters via n/c.  Hold Catapres as per Dr. Landeros . Pt may  have the lopressor.

## 2022-04-22 NOTE — H&P ADULT - PROBLEM SELECTOR PLAN 8
Frequent falls at home ,home situation seems to be not safe .patient lives with her elderly  and has history of noncompliance and missing dialysis . Admitted with falls and weakness .After improvement of symptoms and stabilization  patient will be required to be assessed   for rehabilitation . Social service input requested for [facility] placement . Patient is medically stable to be transferred to rehabilitation facility ,when the bed is available and arranged by social service .

## 2022-04-22 NOTE — H&P ADULT - PROBLEM SELECTOR PLAN 4
continue home medications ,OAC as per  .Due to frequent falls patient is poor candidate to continue previous 3 agents .D/w neurologist  and cardiologist

## 2022-04-22 NOTE — CONSULT NOTE ADULT - ASSESSMENT
Patient came to ED because she  fell today on her way to dialysis, pt states that she is falling everyday and always feels weak. pt poor historian, missed dialysis today, will need admission for placement. Recent admission 04/05/22    esrd  dyspnea  poor compliance  OA  OP  PUD  OM  DM  Anemia

## 2022-04-22 NOTE — H&P ADULT - PROBLEM SELECTOR PLAN 7
continue home medications .Seen by  during recent admission and CTA reviewed. L Fem distal bypass patent. L EIA occl. Patient has had a recent LLE bypass and should follow up with her vascular surgeon. No vascular surgery intervention needed during previous  admission. She may need LLE intervention in the future. Continue wound care.

## 2022-04-22 NOTE — CONSULT NOTE ADULT - SUBJECTIVE AND OBJECTIVE BOX
HPI:      71y year old Female seen at [facility] APER 04 for ----------.  Denies any fever, chills, nausea, vomiting, chest pain, shortness of breath, or calf pain at this time.    REVIEW OF SYSTEMS    PAST MEDICAL & SURGICAL HISTORY:  Diabetes mellitus II    HTN (hypertension)    h/o Anxiety attack    Depression    h/o Myocardial infarct 2007    CAD (coronary artery disease)    h/o Hepatitis A 1969  currently resolved, no symptoms    PAD (peripheral artery disease)    Murmur, cardiac    h/o Smoking  quitted 3/2012    CRF (chronic renal failure), unspecified stage    Dialysis patient    Anemia secondary to renal failure    HTN (hypertension)    coronary stent 2007    s/p Ovarian cyst removal    s/p surgical removal of benign Skin lesion epigastric area        Allergies    latex (Unknown)  No Known Drug Allergies    Intolerances        MEDICATIONS  (STANDING):  atorvastatin 40 milliGRAM(s) Oral at bedtime  calcium acetate 667 milliGRAM(s) Oral three times a day with meals  cloNIDine 0.1 milliGRAM(s) Oral every 12 hours  dextrose 5%. 1000 milliLiter(s) (100 mL/Hr) IV Continuous <Continuous>  dextrose 5%. 1000 milliLiter(s) (50 mL/Hr) IV Continuous <Continuous>  dextrose 50% Injectable 25 Gram(s) IV Push once  dextrose 50% Injectable 12.5 Gram(s) IV Push once  dextrose 50% Injectable 25 Gram(s) IV Push once  diltiazem    Tablet 60 milliGRAM(s) Oral every 8 hours  glucagon  Injectable 1 milliGRAM(s) IntraMuscular once  imipramine 50 milliGRAM(s) Oral daily  insulin lispro (ADMELOG) corrective regimen sliding scale   SubCutaneous three times a day before meals  insulin lispro (ADMELOG) corrective regimen sliding scale   SubCutaneous at bedtime  lactobacillus acidophilus 1 Tablet(s) Oral two times a day  metoprolol tartrate 100 milliGRAM(s) Oral every 12 hours  multivitamin 1 Tablet(s) Oral daily  pantoprazole    Tablet 40 milliGRAM(s) Oral before breakfast  senna 2 Tablet(s) Oral at bedtime    MEDICATIONS  (PRN):  acetaminophen     Tablet .. 650 milliGRAM(s) Oral every 6 hours PRN Temp greater or equal to 38C (100.4F), Mild Pain (1 - 3)  aluminum hydroxide/magnesium hydroxide/simethicone Suspension 30 milliLiter(s) Oral every 4 hours PRN Dyspepsia  dextrose Oral Gel 15 Gram(s) Oral once PRN Blood Glucose LESS THAN 70 milliGRAM(s)/deciliter  melatonin 3 milliGRAM(s) Oral at bedtime PRN Insomnia  ondansetron Injectable 4 milliGRAM(s) IV Push every 8 hours PRN Nausea and/or Vomiting  traMADol 50 milliGRAM(s) Oral three times a day PRN Moderate Pain (4 - 6)      Social History:      FAMILY HISTORY:      Vital Signs Last 24 Hrs  T(C): 36.4 (22 Apr 2022 10:41), Max: 36.4 (22 Apr 2022 10:41)  T(F): 97.5 (22 Apr 2022 10:41), Max: 97.5 (22 Apr 2022 10:41)  HR: 58 (22 Apr 2022 14:20) (58 - 58)  BP: 136/63 (22 Apr 2022 14:20) (132/59 - 136/63)  BP(mean): --  RR: 16 (22 Apr 2022 14:20) (16 - 16)  SpO2: 96% (22 Apr 2022 14:20) (94% - 96%)    PHYSICAL EXAM:  Vascular: DP & PT palpable bilaterally, Capillary refill 3 seconds, Digital hair present bilaterally  Neurological: Light touch sensation intact bilaterally  Musculoskeletal: 5/5 strength in all quadrants bilaterally, AJ & STJ ROM intact  Dermatological: ---------- cm ulceration noted to the ----------, granular wound bed, no probe to bone, no periwound erythema, no fluctuance, no malodor, no proximal streaking at this time    CBC Full  -  ( 22 Apr 2022 12:34 )  WBC Count : 14.49 K/uL  RBC Count : 3.01 M/uL  Hemoglobin : 9.5 g/dL  Hematocrit : 29.3 %  Platelet Count - Automated : 428 K/uL  Mean Cell Volume : 97.3 fl  Mean Cell Hemoglobin : 31.6 pg  Mean Cell Hemoglobin Concentration : 32.4 gm/dL  Auto Neutrophil # : 12.02 K/uL  Auto Lymphocyte # : 0.66 K/uL  Auto Monocyte # : 1.33 K/uL  Auto Eosinophil # : 0.23 K/uL  Auto Basophil # : 0.10 K/uL  Auto Neutrophil % : 82.9 %  Auto Lymphocyte % : 4.6 %  Auto Monocyte % : 9.2 %  Auto Eosinophil % : 1.6 %  Auto Basophil % : 0.7 %      ----------CHEM PANEL----------            Imaging: ----------   HPI:      71y year old Female seen at \[facility]\"" AP[facility] 04 for ----------.  Denies any fever, chills, nausea, vomiting, chest pain, shortness of breath, or calf pain at this time.    REVIEW OF SYSTEMS    PAST MEDICAL & SURGICAL HISTORY:  Diabetes mellitus II    HTN (hypertension)    h/o Anxiety attack    Depression    h/o Myocardial infarct 2007    CAD (coronary artery disease)    h/o Hepatitis A 1969  currently resolved, no symptoms    PAD (peripheral artery disease)    Murmur, cardiac    h/o Smoking  quitted 3/2012    CRF (chronic renal failure), unspecified stage    Dialysis patient    Anemia secondary to renal failure    HTN (hypertension)    coronary stent 2007    s/p Ovarian cyst removal    s/p surgical removal of benign Skin lesion epigastric area        Allergies    latex (Unknown)  No Known Drug Allergies    Intolerances        MEDICATIONS  (STANDING):  atorvastatin 40 milliGRAM(s) Oral at bedtime  calcium acetate 667 milliGRAM(s) Oral three times a day with meals  cloNIDine 0.1 milliGRAM(s) Oral every 12 hours  dextrose 5%. 1000 milliLiter(s) (100 mL/Hr) IV Continuous <Continuous>  dextrose 5%. 1000 milliLiter(s) (50 mL/Hr) IV Continuous <Continuous>  dextrose 50% Injectable 25 Gram(s) IV Push once  dextrose 50% Injectable 12.5 Gram(s) IV Push once  dextrose 50% Injectable 25 Gram(s) IV Push once  diltiazem    Tablet 60 milliGRAM(s) Oral every 8 hours  glucagon  Injectable 1 milliGRAM(s) IntraMuscular once  imipramine 50 milliGRAM(s) Oral daily  insulin lispro (ADMELOG) corrective regimen sliding scale   SubCutaneous three times a day before meals  insulin lispro (ADMELOG) corrective regimen sliding scale   SubCutaneous at bedtime  lactobacillus acidophilus 1 Tablet(s) Oral two times a day  metoprolol tartrate 100 milliGRAM(s) Oral every 12 hours  multivitamin 1 Tablet(s) Oral daily  pantoprazole    Tablet 40 milliGRAM(s) Oral before breakfast  senna 2 Tablet(s) Oral at bedtime    MEDICATIONS  (PRN):  acetaminophen     Tablet .. 650 milliGRAM(s) Oral every 6 hours PRN Temp greater or equal to 38C (100.4F), Mild Pain (1 - 3)  aluminum hydroxide/magnesium hydroxide/simethicone Suspension 30 milliLiter(s) Oral every 4 hours PRN Dyspepsia  dextrose Oral Gel 15 Gram(s) Oral once PRN Blood Glucose LESS THAN 70 milliGRAM(s)/deciliter  melatonin 3 milliGRAM(s) Oral at bedtime PRN Insomnia  ondansetron Injectable 4 milliGRAM(s) IV Push every 8 hours PRN Nausea and/or Vomiting  traMADol 50 milliGRAM(s) Oral three times a day PRN Moderate Pain (4 - 6)      Social History:      FAMILY HISTORY:    Vital Signs Last 24 Hrs  T(C): 36.8 (04-23-22 @ 05:08), Max: 37.1 (04-22-22 @ 21:20)  T(F): 98.2 (04-23-22 @ 05:08), Max: 98.8 (04-22-22 @ 21:20)  HR: 65 (04-23-22 @ 05:08) (58 - 66)  BP: 130/65 (04-23-22 @ 05:08) (130/65 - 143/73)  BP(mean): --  RR: 18 (04-23-22 @ 05:08) (16 - 19)  SpO2: 92% (04-23-22 @ 05:08) (92% - 100%)        PHYSICAL EXAM:  Vascular: DP & PT non-palpable bilaterally, Capillary refill 3 seconds, Digital hair absent bilaterally. Skin cold to touch, right foot  Neurological: Light touch sensation intact bilaterally  Musculoskeletal: POP to the right hallux. 5/5 strength in all quadrants bilaterally, AJ & STJ ROM intact  Dermatological: Right hallux gangrene ulceration noted, right 2nd digit gangrenous changes, no open wounds, no probe to bone, no periwound erythema, no fluctuance, no malodor, no proximal streaking at this time    CBC Full  -  ( 23 Apr 2022 09:21 )  WBC Count : 11.24 K/uL  RBC Count : 3.08 M/uL  Hemoglobin : 9.5 g/dL  Hematocrit : 30.3 %  Platelet Count - Automated : 445 K/uL  Mean Cell Volume : 98.4 fl  Mean Cell Hemoglobin : 30.8 pg  Mean Cell Hemoglobin Concentration : 31.4 gm/dL  Auto Neutrophil # : 8.30 K/uL  Auto Lymphocyte # : 1.06 K/uL  Auto Monocyte # : 1.46 K/uL  Auto Eosinophil # : 0.20 K/uL  Auto Basophil # : 0.08 K/uL  Auto Neutrophil % : 73.9 %  Auto Lymphocyte % : 9.4 %  Auto Monocyte % : 13.0 %  Auto Eosinophil % : 1.8 %  Auto Basophil % : 0.7 %

## 2022-04-22 NOTE — PHYSICAL THERAPY INITIAL EVALUATION ADULT - ADDITIONAL COMMENTS
Patient reports that she lives in a private house with 3 HIRA, bed/bath on main level. Ambulates with a RW at baseline, also has cane.

## 2022-04-22 NOTE — ED ADULT TRIAGE NOTE - CHIEF COMPLAINT QUOTE
feeling weak, fell yesterday and today while walking to the car- ems states patient denies any LOC , pain to the knees and buttocks, on plavix, was 84% on room air- as per ems, on 2 litres oxygen, patient appears very drowsy

## 2022-04-22 NOTE — CONSULT NOTE ADULT - ASSESSMENT
Assessment and Plan:       Thank you for courtesy of this consult.     Will follow.  Discussed with the primary team.     Lupe Tsai MD  Division of Infectious Diseases   Cell [phone] between 8am and 6pm   After 6pm and weekends please call ID service at [phone].  70yo female with hx of ESRD on HD, PVD s/p L femoral bypass, who presented after fall. Recently treated for possible pneumonia, but also found to have osteomyelitis of L medial malleolus and distal aspect of R 1st toe on MRI. Discharged on cefazolin with HD x 4 weeks. Recent tissue cultures from 3/30/22 grew klebsiella oxytoca/raoutella oxytoca, E. coli, MSSA.    No obvious signs of surrounding cellulitis and no wound drainage noted. Patient without fevers but has leukocytosis of 14, although similar to recent WBC from discharge.    -resume cefazolin 1g IV q24h, when ready for discharge can switch back to 2g-2g-3g with HD until May 10  -check ESR, CRP with next labs  -wound care    Thank you for courtesy of this consult.     Will follow.  Discussed with the primary team.     Lupe Tsai MD  Division of Infectious Diseases   Cell [phone] between 8am and 6pm   After 6pm and weekends please call ID service at [phone].

## 2022-04-23 DIAGNOSIS — M86.60 OTHER CHRONIC OSTEOMYELITIS, UNSPECIFIED SITE: ICD-10-CM

## 2022-04-23 DIAGNOSIS — I96 GANGRENE, NOT ELSEWHERE CLASSIFIED: ICD-10-CM

## 2022-04-23 LAB
A1C WITH ESTIMATED AVERAGE GLUCOSE RESULT: 8.1 % — HIGH (ref 4–5.6)
ALBUMIN SERPL ELPH-MCNC: 2.7 G/DL — LOW (ref 3.3–5)
ALP SERPL-CCNC: 93 U/L — SIGNIFICANT CHANGE UP (ref 40–120)
ALT FLD-CCNC: <6 U/L — LOW (ref 12–78)
ANION GAP SERPL CALC-SCNC: 17 MMOL/L — SIGNIFICANT CHANGE UP (ref 5–17)
AST SERPL-CCNC: 22 U/L — SIGNIFICANT CHANGE UP (ref 15–37)
BASOPHILS # BLD AUTO: 0.08 K/UL — SIGNIFICANT CHANGE UP (ref 0–0.2)
BASOPHILS NFR BLD AUTO: 0.7 % — SIGNIFICANT CHANGE UP (ref 0–2)
BILIRUB SERPL-MCNC: 0.4 MG/DL — SIGNIFICANT CHANGE UP (ref 0.2–1.2)
BUN SERPL-MCNC: 74 MG/DL — HIGH (ref 7–23)
CALCIUM SERPL-MCNC: 9.3 MG/DL — SIGNIFICANT CHANGE UP (ref 8.5–10.1)
CHLORIDE SERPL-SCNC: 92 MMOL/L — LOW (ref 96–108)
CO2 SERPL-SCNC: 25 MMOL/L — SIGNIFICANT CHANGE UP (ref 22–31)
CREAT SERPL-MCNC: 8.7 MG/DL — HIGH (ref 0.5–1.3)
CRP SERPL-MCNC: 42 MG/L — HIGH
EGFR: 4 ML/MIN/1.73M2 — LOW
EOSINOPHIL # BLD AUTO: 0.2 K/UL — SIGNIFICANT CHANGE UP (ref 0–0.5)
EOSINOPHIL NFR BLD AUTO: 1.8 % — SIGNIFICANT CHANGE UP (ref 0–6)
ERYTHROCYTE [SEDIMENTATION RATE] IN BLOOD: 49 MM/HR — HIGH (ref 0–20)
ESTIMATED AVERAGE GLUCOSE: 186 MG/DL — HIGH (ref 68–114)
FOLATE SERPL-MCNC: >20 NG/ML — SIGNIFICANT CHANGE UP
GLUCOSE SERPL-MCNC: 161 MG/DL — HIGH (ref 70–99)
HCT VFR BLD CALC: 30.3 % — LOW (ref 34.5–45)
HGB BLD-MCNC: 9.5 G/DL — LOW (ref 11.5–15.5)
IMM GRANULOCYTES NFR BLD AUTO: 1.2 % — SIGNIFICANT CHANGE UP (ref 0–1.5)
LYMPHOCYTES # BLD AUTO: 1.06 K/UL — SIGNIFICANT CHANGE UP (ref 1–3.3)
LYMPHOCYTES # BLD AUTO: 9.4 % — LOW (ref 13–44)
MCHC RBC-ENTMCNC: 30.8 PG — SIGNIFICANT CHANGE UP (ref 27–34)
MCHC RBC-ENTMCNC: 31.4 GM/DL — LOW (ref 32–36)
MCV RBC AUTO: 98.4 FL — SIGNIFICANT CHANGE UP (ref 80–100)
MONOCYTES # BLD AUTO: 1.46 K/UL — HIGH (ref 0–0.9)
MONOCYTES NFR BLD AUTO: 13 % — SIGNIFICANT CHANGE UP (ref 2–14)
NEUTROPHILS # BLD AUTO: 8.3 K/UL — HIGH (ref 1.8–7.4)
NEUTROPHILS NFR BLD AUTO: 73.9 % — SIGNIFICANT CHANGE UP (ref 43–77)
NRBC # BLD: 0 /100 WBCS — SIGNIFICANT CHANGE UP (ref 0–0)
PLATELET # BLD AUTO: 445 K/UL — HIGH (ref 150–400)
POTASSIUM SERPL-MCNC: 5.4 MMOL/L — HIGH (ref 3.5–5.3)
POTASSIUM SERPL-SCNC: 5.4 MMOL/L — HIGH (ref 3.5–5.3)
PROT SERPL-MCNC: 6.8 G/DL — SIGNIFICANT CHANGE UP (ref 6–8.3)
RBC # BLD: 3.08 M/UL — LOW (ref 3.8–5.2)
RBC # FLD: 17.9 % — HIGH (ref 10.3–14.5)
SODIUM SERPL-SCNC: 134 MMOL/L — LOW (ref 135–145)
TSH SERPL-MCNC: 1.72 UIU/ML — SIGNIFICANT CHANGE UP (ref 0.36–3.74)
VIT B12 SERPL-MCNC: 890 PG/ML — SIGNIFICANT CHANGE UP (ref 232–1245)
WBC # BLD: 11.24 K/UL — HIGH (ref 3.8–10.5)
WBC # FLD AUTO: 11.24 K/UL — HIGH (ref 3.8–10.5)

## 2022-04-23 RX ORDER — ASPIRIN/CALCIUM CARB/MAGNESIUM 324 MG
81 TABLET ORAL DAILY
Refills: 0 | Status: DISCONTINUED | OUTPATIENT
Start: 2022-04-23 | End: 2022-05-02

## 2022-04-23 RX ORDER — ERYTHROPOIETIN 10000 [IU]/ML
10000 INJECTION, SOLUTION INTRAVENOUS; SUBCUTANEOUS
Refills: 0 | Status: DISCONTINUED | OUTPATIENT
Start: 2022-04-23 | End: 2022-05-02

## 2022-04-23 RX ADMIN — Medication 667 MILLIGRAM(S): at 18:39

## 2022-04-23 RX ADMIN — Medication 1 TABLET(S): at 07:23

## 2022-04-23 RX ADMIN — Medication 60 MILLIGRAM(S): at 21:02

## 2022-04-23 RX ADMIN — Medication 50 MILLIGRAM(S): at 12:12

## 2022-04-23 RX ADMIN — Medication 1 TABLET(S): at 18:39

## 2022-04-23 RX ADMIN — Medication 100 MILLIGRAM(S): at 18:40

## 2022-04-23 RX ADMIN — Medication 667 MILLIGRAM(S): at 08:47

## 2022-04-23 RX ADMIN — Medication 60 MILLIGRAM(S): at 05:25

## 2022-04-23 RX ADMIN — TRAMADOL HYDROCHLORIDE 50 MILLIGRAM(S): 50 TABLET ORAL at 06:29

## 2022-04-23 RX ADMIN — Medication 4: at 12:11

## 2022-04-23 RX ADMIN — CLOPIDOGREL BISULFATE 75 MILLIGRAM(S): 75 TABLET, FILM COATED ORAL at 12:12

## 2022-04-23 RX ADMIN — Medication 100 MILLIGRAM(S): at 18:50

## 2022-04-23 RX ADMIN — ERYTHROPOIETIN 10000 UNIT(S): 10000 INJECTION, SOLUTION INTRAVENOUS; SUBCUTANEOUS at 16:08

## 2022-04-23 RX ADMIN — HEPARIN SODIUM 5000 UNIT(S): 5000 INJECTION INTRAVENOUS; SUBCUTANEOUS at 08:47

## 2022-04-23 RX ADMIN — Medication 81 MILLIGRAM(S): at 12:12

## 2022-04-23 RX ADMIN — Medication 1: at 07:50

## 2022-04-23 RX ADMIN — ATORVASTATIN CALCIUM 40 MILLIGRAM(S): 80 TABLET, FILM COATED ORAL at 21:02

## 2022-04-23 RX ADMIN — Medication 1 TABLET(S): at 12:12

## 2022-04-23 RX ADMIN — PANTOPRAZOLE SODIUM 40 MILLIGRAM(S): 20 TABLET, DELAYED RELEASE ORAL at 05:25

## 2022-04-23 RX ADMIN — TRAMADOL HYDROCHLORIDE 50 MILLIGRAM(S): 50 TABLET ORAL at 05:29

## 2022-04-23 RX ADMIN — Medication 100 MILLIGRAM(S): at 07:23

## 2022-04-23 RX ADMIN — Medication 667 MILLIGRAM(S): at 12:12

## 2022-04-23 RX ADMIN — SENNA PLUS 2 TABLET(S): 8.6 TABLET ORAL at 21:02

## 2022-04-23 RX ADMIN — Medication 0.1 MILLIGRAM(S): at 05:26

## 2022-04-23 RX ADMIN — Medication 0.1 MILLIGRAM(S): at 18:38

## 2022-04-23 RX ADMIN — HEPARIN SODIUM 5000 UNIT(S): 5000 INJECTION INTRAVENOUS; SUBCUTANEOUS at 20:14

## 2022-04-23 NOTE — DIETITIAN INITIAL EVALUATION ADULT - ADD RECOMMEND
1) Recommend Consistent carbohydrate, renal diet; d/c DASH/TLC   2) Add Nepro BID and Nephro-elvie daily   3) Add Abel BID  4) Monitor PO intake, diet tolerance, weight trends, labs, GI function, and skin integrity

## 2022-04-23 NOTE — DIETITIAN INITIAL EVALUATION ADULT - ORAL INTAKE PTA/DIET HISTORY
-Reports good PO intake of 2-3 meals  -Endorses drinking Nepro supplements and brining snacks to HD session  -Pt appears to be semi-familiar with renal diet education; noted to be forgetful

## 2022-04-23 NOTE — DIETITIAN INITIAL EVALUATION ADULT - PERTINENT LABORATORY DATA
04-23    134<L>  |  92<L>  |  74<H>  ----------------------------<  161<H>  5.4<H>   |  25  |  8.70<H>    Ca    9.3      23 Apr 2022 09:21    TPro  6.8  /  Alb  2.7<L>  /  TBili  0.4  /  DBili  x   /  AST  22  /  ALT  <6<L>  /  AlkPhos  93  04-23  POCT Blood Glucose.: 163 mg/dL (04-23-22 @ 07:24)  A1C with Estimated Average Glucose Result: 8.3 % (04-06-22 @ 09:45)

## 2022-04-23 NOTE — PROGRESS NOTE ADULT - SUBJECTIVE AND OBJECTIVE BOX
Date/Time Patient Seen:  		  Referring MD:   Data Reviewed	       Patient is a 71y old  Female who presents with a chief complaint of falls ,weakness (22 Apr 2022 21:27)      Subjective/HPI     PAST MEDICAL & SURGICAL HISTORY:  Diabetes mellitus II    HTN (hypertension)    h/o Anxiety attack    Depression    h/o Myocardial infarct 2007    CAD (coronary artery disease)    CAD (coronary artery disease)    h/o Hepatitis A 1969  currently resolved, no symptoms    PAD (peripheral artery disease)    Murmur, cardiac    h/o Smoking  quitted 3/2012    CRF (chronic renal failure), unspecified stage    Dialysis patient    Anemia secondary to renal failure    HTN (hypertension)    coronary stent 2007    s/p Ovarian cyst removal    s/p surgical removal of benign Skin lesion epigastric area          Medication list         MEDICATIONS  (STANDING):  atorvastatin 40 milliGRAM(s) Oral at bedtime  calcium acetate 667 milliGRAM(s) Oral three times a day with meals  ceFAZolin   IVPB 1000 milliGRAM(s) IV Intermittent every 24 hours  cloNIDine 0.1 milliGRAM(s) Oral every 12 hours  clopidogrel Tablet 75 milliGRAM(s) Oral daily  dextrose 5%. 1000 milliLiter(s) (100 mL/Hr) IV Continuous <Continuous>  dextrose 5%. 1000 milliLiter(s) (50 mL/Hr) IV Continuous <Continuous>  dextrose 50% Injectable 25 Gram(s) IV Push once  dextrose 50% Injectable 12.5 Gram(s) IV Push once  dextrose 50% Injectable 25 Gram(s) IV Push once  diltiazem    Tablet 60 milliGRAM(s) Oral every 8 hours  glucagon  Injectable 1 milliGRAM(s) IntraMuscular once  heparin   Injectable 5000 Unit(s) SubCutaneous every 12 hours  imipramine 50 milliGRAM(s) Oral daily  insulin lispro (ADMELOG) corrective regimen sliding scale   SubCutaneous three times a day before meals  insulin lispro (ADMELOG) corrective regimen sliding scale   SubCutaneous at bedtime  lactobacillus acidophilus 1 Tablet(s) Oral two times a day  metoprolol tartrate 100 milliGRAM(s) Oral every 12 hours  multivitamin 1 Tablet(s) Oral daily  pantoprazole    Tablet 40 milliGRAM(s) Oral before breakfast  senna 2 Tablet(s) Oral at bedtime    MEDICATIONS  (PRN):  acetaminophen     Tablet .. 650 milliGRAM(s) Oral every 6 hours PRN Temp greater or equal to 38C (100.4F), Mild Pain (1 - 3)  aluminum hydroxide/magnesium hydroxide/simethicone Suspension 30 milliLiter(s) Oral every 4 hours PRN Dyspepsia  dextrose Oral Gel 15 Gram(s) Oral once PRN Blood Glucose LESS THAN 70 milliGRAM(s)/deciliter  melatonin 3 milliGRAM(s) Oral at bedtime PRN Insomnia  ondansetron Injectable 4 milliGRAM(s) IV Push every 8 hours PRN Nausea and/or Vomiting  traMADol 50 milliGRAM(s) Oral three times a day PRN Moderate Pain (4 - 6)         Vitals log        ICU Vital Signs Last 24 Hrs  T(C): 36.8 (23 Apr 2022 05:08), Max: 37.1 (22 Apr 2022 21:20)  T(F): 98.2 (23 Apr 2022 05:08), Max: 98.8 (22 Apr 2022 21:20)  HR: 65 (23 Apr 2022 05:08) (58 - 66)  BP: 130/65 (23 Apr 2022 05:08) (130/65 - 143/73)  BP(mean): --  ABP: --  ABP(mean): --  RR: 18 (23 Apr 2022 05:08) (16 - 19)  SpO2: 92% (23 Apr 2022 05:08) (92% - 100%)           Input and Output:  I&O's Detail      Lab Data                        9.5    14.49 )-----------( 428      ( 22 Apr 2022 12:34 )             29.3     04-22    134<L>  |  95<L>  |  57<H>  ----------------------------<  128<H>  5.3   |  27  |  7.60<H>    Ca    9.4      22 Apr 2022 12:34    TPro  7.0  /  Alb  2.7<L>  /  TBili  0.3  /  DBili  x   /  AST  24  /  ALT  <6<L>  /  AlkPhos  92  04-22            Review of Systems	      Objective     Physical Examination    heart s1s2  lung dec BS  abd soft  head nc      Pertinent Lab findings & Imaging      Renteria:  NO   Adequate UO     I&O's Detail           Discussed with:     Cultures:	        Radiology

## 2022-04-23 NOTE — DIETITIAN INITIAL EVALUATION ADULT - OTHER INFO
GI/Intake:   -Reports moderate intake in-house   -Presenting with new full mouth dentures, c/o discomfort; easy to chew diet ordered   -Last BM reported this AM; bowel regimen ordered (Senna) and probiotic     Renal:   -ESRD; scheduled for HD today   -Hyperkalemic, hyponatremic  -Renal diet ordered with Nepro supplements     Endo:  -Hx of DM  -Latest A1c: 8.3% - uncontrolled   -Pt reports taking Metformin; originally appeared to be confused if this was her DM medication or her Husbands   -SSI ordered; Consistent carbohydrate diet     Weight Hx:  -Current: 134 pounds   -Noted at 133 pounds (4/6/22)   -Pt states she feels like she lost weight but unsure of how much or timeline of weight loss

## 2022-04-23 NOTE — PROGRESS NOTE ADULT - SUBJECTIVE AND OBJECTIVE BOX
Neurology follow up note    SHILO [id number]      Interval History:    Patient feels ok no new complaints.    Allergies    latex (Unknown)  No Known Drug Allergies    Intolerances        MEDICATIONS    acetaminophen     Tablet .. 650 milliGRAM(s) Oral every 6 hours PRN  aluminum hydroxide/magnesium hydroxide/simethicone Suspension 30 milliLiter(s) Oral every 4 hours PRN  atorvastatin 40 milliGRAM(s) Oral at bedtime  calcium acetate 667 milliGRAM(s) Oral three times a day with meals  ceFAZolin   IVPB 1000 milliGRAM(s) IV Intermittent every 24 hours  cloNIDine 0.1 milliGRAM(s) Oral every 12 hours  clopidogrel Tablet 75 milliGRAM(s) Oral daily  dextrose 5%. 1000 milliLiter(s) IV Continuous <Continuous>  dextrose 5%. 1000 milliLiter(s) IV Continuous <Continuous>  dextrose 50% Injectable 25 Gram(s) IV Push once  dextrose 50% Injectable 12.5 Gram(s) IV Push once  dextrose 50% Injectable 25 Gram(s) IV Push once  dextrose Oral Gel 15 Gram(s) Oral once PRN  diltiazem    Tablet 60 milliGRAM(s) Oral every 8 hours  glucagon  Injectable 1 milliGRAM(s) IntraMuscular once  heparin   Injectable 5000 Unit(s) SubCutaneous every 12 hours  imipramine 50 milliGRAM(s) Oral daily  insulin lispro (ADMELOG) corrective regimen sliding scale   SubCutaneous three times a day before meals  insulin lispro (ADMELOG) corrective regimen sliding scale   SubCutaneous at bedtime  lactobacillus acidophilus 1 Tablet(s) Oral two times a day  melatonin 3 milliGRAM(s) Oral at bedtime PRN  metoprolol tartrate 100 milliGRAM(s) Oral every 12 hours  multivitamin 1 Tablet(s) Oral daily  ondansetron Injectable 4 milliGRAM(s) IV Push every 8 hours PRN  pantoprazole    Tablet 40 milliGRAM(s) Oral before breakfast  senna 2 Tablet(s) Oral at bedtime  traMADol 50 milliGRAM(s) Oral three times a day PRN          Height (cm): 175.3 (04-22 @ 10:41)  Weight (kg): 61.2 (04-22 @ 10:41)  BMI (kg/m2): 19.9 (04-22 @ 10:41)    Vital Signs Last 24 Hrs  T(C): 36.8 (23 Apr 2022 05:08), Max: 37.1 (22 Apr 2022 21:20)  T(F): 98.2 (23 Apr 2022 05:08), Max: 98.8 (22 Apr 2022 21:20)  HR: 65 (23 Apr 2022 05:08) (58 - 66)  BP: 130/65 (23 Apr 2022 05:08) (130/65 - 143/73)  BP(mean): --  RR: 18 (23 Apr 2022 05:08) (16 - 19)  SpO2: 92% (23 Apr 2022 05:08) (92% - 100%)    REVIEW OF SYSTEMS:  Constitutional:  No fever, chills, or night sweats.  Head:  No headaches.  Eyes:  No double vision or blurry vision.  Ears: No ringing in the ears.  Neck:  No neck pain.  Cardiovascular:  No chest pain.  Respiratory:  No shortness of breath.  Abdomen:  No nausea, vomiting, or abdominal pain.  Extremities/Neurological:  Positive numbness and tingling in bilateral feet.  Spine:  No history of back pain.    PHYSICAL EXAMINATION:    HEENT:  Head:  Normocephalic, atraumatic.  Eyes:  No scleral icterus.  Ears:  Hearing bilaterally intact.  NECK:  Supple.  RESPIRATORY:  Good air entry bilaterally.  CARDIOVASCULAR:  S1 and S2 heard.  ABDOMEN:  Soft, nontender.  EXTREMITIES:  No clubbing or cyanosis was noted.  Positive sign of scars and blood on the bottom of her feet from scrapes.      NEUROLOGIC:  The patient is awake and alert.  Extraocular movements were intact.  Upon conversing with the patient, at times, she may be slightly forgetful, but as per my conversation with the spouse, he has noticed that lately as well.  Speech was fluent.  Smile was symmetric.  Full visual fields.  Motor:  Bilateral upper 5/5.  Bilateral lower 4/5.  The patient had markedly impaired proprioception in bilateral lower extremities.  Knee jerks bilaterally were trace, suspect this could be secondary to diabetes and possibly peripheral neuropathy.              LABS:  CBC Full  -  ( 22 Apr 2022 12:34 )  WBC Count : 14.49 K/uL  RBC Count : 3.01 M/uL  Hemoglobin : 9.5 g/dL  Hematocrit : 29.3 %  Platelet Count - Automated : 428 K/uL  Mean Cell Volume : 97.3 fl  Mean Cell Hemoglobin : 31.6 pg  Mean Cell Hemoglobin Concentration : 32.4 gm/dL  Auto Neutrophil # : 12.02 K/uL  Auto Lymphocyte # : 0.66 K/uL  Auto Monocyte # : 1.33 K/uL  Auto Eosinophil # : 0.23 K/uL  Auto Basophil # : 0.10 K/uL  Auto Neutrophil % : 82.9 %  Auto Lymphocyte % : 4.6 %  Auto Monocyte % : 9.2 %  Auto Eosinophil % : 1.6 %  Auto Basophil % : 0.7 %      04-22    134<L>  |  95<L>  |  57<H>  ----------------------------<  128<H>  5.3   |  27  |  7.60<H>    Ca    9.4      22 Apr 2022 12:34    TPro  7.0  /  Alb  2.7<L>  /  TBili  0.3  /  DBili  x   /  AST  24  /  ALT  <6<L>  /  AlkPhos  92  04-22    Hemoglobin A1C:     LIVER FUNCTIONS - ( 22 Apr 2022 12:34 )  Alb: 2.7 g/dL / Pro: 7.0 g/dL / ALK PHOS: 92 U/L / ALT: <6 U/L / AST: 24 U/L / GGT: x           Vitamin B12         RADIOLOGY  ANALYSIS AND PLAN:  This is a 71-year-old with a history of difficulty ambulating, ataxia, and falls.  For ataxia and falls, suspect this is multifactorial secondary to diabetes and end-stage kidney disease and deconditioning, age-related changes leading to peripheral neuropathy type of component as well.  Suspect less likely this is a primary central nervous system event secondary to as per the patient and her spouse both legs become weak.  This points toward more a peripheral type of etiology.  Would recommend physical therapy evaluation.  Fall precaution.  For history of diabetes, strict control of blood sugars.  For history of hypertension, monitor systolic blood pressure.  For hyperlipidemia, continue the patient on statin.  The patient appears to be on multiple blood thinning medications, would recommend risks versus benefits must be weighed in regards to the patient's history of falls.  For possibly mild cognitive impairment, we will check TSH, vitamin B12, and folate.  do to high risk of fall medical team noted KIT hansen on antiplatelets     Spoke with spouse, Geoffrey, at [phone], and the patient in great detail.  She will need rehab which she wants to go to    Greater than 45 minutes of time was spent with the patient, plan of care, reviewing data, with greater than 50% of the visit was spent counseling and/or coordinating care with multidisciplinary healthcare team   Neurology follow up note    SHILO [id number]      Interval History:    Patient feels ok no new complaints.    Allergies    latex (Unknown)  No Known Drug Allergies    Intolerances        MEDICATIONS    acetaminophen     Tablet .. 650 milliGRAM(s) Oral every 6 hours PRN  aluminum hydroxide/magnesium hydroxide/simethicone Suspension 30 milliLiter(s) Oral every 4 hours PRN  atorvastatin 40 milliGRAM(s) Oral at bedtime  calcium acetate 667 milliGRAM(s) Oral three times a day with meals  ceFAZolin   IVPB 1000 milliGRAM(s) IV Intermittent every 24 hours  cloNIDine 0.1 milliGRAM(s) Oral every 12 hours  clopidogrel Tablet 75 milliGRAM(s) Oral daily  dextrose 5%. 1000 milliLiter(s) IV Continuous <Continuous>  dextrose 5%. 1000 milliLiter(s) IV Continuous <Continuous>  dextrose 50% Injectable 25 Gram(s) IV Push once  dextrose 50% Injectable 12.5 Gram(s) IV Push once  dextrose 50% Injectable 25 Gram(s) IV Push once  dextrose Oral Gel 15 Gram(s) Oral once PRN  diltiazem    Tablet 60 milliGRAM(s) Oral every 8 hours  glucagon  Injectable 1 milliGRAM(s) IntraMuscular once  heparin   Injectable 5000 Unit(s) SubCutaneous every 12 hours  imipramine 50 milliGRAM(s) Oral daily  insulin lispro (ADMELOG) corrective regimen sliding scale   SubCutaneous three times a day before meals  insulin lispro (ADMELOG) corrective regimen sliding scale   SubCutaneous at bedtime  lactobacillus acidophilus 1 Tablet(s) Oral two times a day  melatonin 3 milliGRAM(s) Oral at bedtime PRN  metoprolol tartrate 100 milliGRAM(s) Oral every 12 hours  multivitamin 1 Tablet(s) Oral daily  ondansetron Injectable 4 milliGRAM(s) IV Push every 8 hours PRN  pantoprazole    Tablet 40 milliGRAM(s) Oral before breakfast  senna 2 Tablet(s) Oral at bedtime  traMADol 50 milliGRAM(s) Oral three times a day PRN          Height (cm): 175.3 (04-22 @ 10:41)  Weight (kg): 61.2 (04-22 @ 10:41)  BMI (kg/m2): 19.9 (04-22 @ 10:41)    Vital Signs Last 24 Hrs  T(C): 36.8 (23 Apr 2022 05:08), Max: 37.1 (22 Apr 2022 21:20)  T(F): 98.2 (23 Apr 2022 05:08), Max: 98.8 (22 Apr 2022 21:20)  HR: 65 (23 Apr 2022 05:08) (58 - 66)  BP: 130/65 (23 Apr 2022 05:08) (130/65 - 143/73)  BP(mean): --  RR: 18 (23 Apr 2022 05:08) (16 - 19)  SpO2: 92% (23 Apr 2022 05:08) (92% - 100%)    REVIEW OF SYSTEMS:  Constitutional:  No fever, chills, or night sweats.  Head:  No headaches.  Eyes:  No double vision or blurry vision.  Ears: No ringing in the ears.  Neck:  No neck pain.  Cardiovascular:  No chest pain.  Respiratory:  No shortness of breath.  Abdomen:  No nausea, vomiting, or abdominal pain.  Extremities/Neurological:  Positive numbness and tingling in bilateral feet.  Spine:  No history of back pain.    PHYSICAL EXAMINATION:    HEENT:  Head:  Normocephalic, atraumatic.  Eyes:  No scleral icterus.  Ears:  Hearing bilaterally intact.  NECK:  Supple.  RESPIRATORY:  Good air entry bilaterally.  CARDIOVASCULAR:  S1 and S2 heard.  ABDOMEN:  Soft, nontender.  EXTREMITIES:  No clubbing or cyanosis was noted.  Positive sign of scars and blood on the bottom of her feet from scrapes.      NEUROLOGIC:  The patient is awake and alert.  Extraocular movements were intact.  Upon conversing with the patient, at times, she may be slightly forgetful, but as per my conversation with the spouse, he has noticed that lately as well.  Speech was fluent.  Smile was symmetric.  Full visual fields.  Motor:  Bilateral upper 5/5.  Bilateral lower 4/5.  The patient had markedly impaired proprioception in bilateral lower extremities.  Knee jerks bilaterally were trace, suspect this could be secondary to diabetes and possibly peripheral neuropathy.              LABS:  CBC Full  -  ( 22 Apr 2022 12:34 )  WBC Count : 14.49 K/uL  RBC Count : 3.01 M/uL  Hemoglobin : 9.5 g/dL  Hematocrit : 29.3 %  Platelet Count - Automated : 428 K/uL  Mean Cell Volume : 97.3 fl  Mean Cell Hemoglobin : 31.6 pg  Mean Cell Hemoglobin Concentration : 32.4 gm/dL  Auto Neutrophil # : 12.02 K/uL  Auto Lymphocyte # : 0.66 K/uL  Auto Monocyte # : 1.33 K/uL  Auto Eosinophil # : 0.23 K/uL  Auto Basophil # : 0.10 K/uL  Auto Neutrophil % : 82.9 %  Auto Lymphocyte % : 4.6 %  Auto Monocyte % : 9.2 %  Auto Eosinophil % : 1.6 %  Auto Basophil % : 0.7 %      04-22    134<L>  |  95<L>  |  57<H>  ----------------------------<  128<H>  5.3   |  27  |  7.60<H>    Ca    9.4      22 Apr 2022 12:34    TPro  7.0  /  Alb  2.7<L>  /  TBili  0.3  /  DBili  x   /  AST  24  /  ALT  <6<L>  /  AlkPhos  92  04-22    Hemoglobin A1C:     LIVER FUNCTIONS - ( 22 Apr 2022 12:34 )  Alb: 2.7 g/dL / Pro: 7.0 g/dL / ALK PHOS: 92 U/L / ALT: <6 U/L / AST: 24 U/L / GGT: x           Vitamin B12         RADIOLOGY  ANALYSIS AND PLAN:  This is a 71-year-old with a history of difficulty ambulating, ataxia, and falls.  For ataxia and falls, suspect this is multifactorial secondary to diabetes and end-stage kidney disease and deconditioning, age-related changes leading to peripheral neuropathy type of component as well.  Suspect less likely this is a primary central nervous system event secondary to as per the patient and her spouse both legs become weak.  This points toward more a peripheral type of etiology.  Would recommend physical therapy evaluation.  Fall precaution.  For history of diabetes, strict control of blood sugars.  For history of hypertension, monitor systolic blood pressure.  For hyperlipidemia, continue the patient on statin.  The patient appears to be on multiple blood thinning medications, would recommend risks versus benefits must be weighed in regards to the patient's history of falls.  For possibly mild cognitive impairment, we will check TSH, vitamin B12, and folate.  do to high risk of fall medical team noted AC jade on antiplatelets     Spoke with spouse, Geoffrey, at [phone] 4/22 today went to [facility] 4.23 and the patient in great detail.  She will need rehab which she wants to go to    Greater than 45 minutes of time was spent with the patient, plan of care, reviewing data, with greater than 50% of the visit was spent counseling and/or coordinating care with multidisciplinary healthcare team

## 2022-04-23 NOTE — DIETITIAN NUTRITION RISK NOTIFICATION - TREATMENT: THE FOLLOWING DIET HAS BEEN RECOMMENDED
Diet, Consistent Carbohydrate Renal w/Evening Snack:   DASH/TLC {Sodium & Cholesterol Restricted}  Easy to Chew (EASYTOCHEW)  Supplement Feeding Modality:  Oral  Nepro Cans or Servings Per Day:  1       Frequency:  Daily (04-22-22 @ 14:39) [Active]

## 2022-04-23 NOTE — DIETITIAN INITIAL EVALUATION ADULT - NSFNSPHYEXAMSKINFT_GEN_A_CORE
Pressure Injury 1: Right:,first toe, Unstageable  Pressure Injury 2: Right:,second toe, Unstageable  Pressure Injury 3: Left:,first toe,fourth toe,fifth toe, Suspected deep tissue injury  Pressure Injury 4: Left:,malleolus,medial, Unstageable

## 2022-04-23 NOTE — DIETITIAN INITIAL EVALUATION ADULT - NS FNS DIET ORDER
Diet, Consistent Carbohydrate Renal w/Evening Snack:   DASH/TLC {Sodium & Cholesterol Restricted}  Easy to Chew (EASYTOCHEW)  Supplement Feeding Modality:  Oral  Nepro Cans or Servings Per Day:  1       Frequency:  Daily (04-22-22 @ 14:39)

## 2022-04-23 NOTE — DIETITIAN INITIAL EVALUATION ADULT - PERTINENT MEDS FT
MEDICATIONS  (STANDING):  aspirin enteric coated 81 milliGRAM(s) Oral daily  atorvastatin 40 milliGRAM(s) Oral at bedtime  calcium acetate 667 milliGRAM(s) Oral three times a day with meals  ceFAZolin   IVPB 1000 milliGRAM(s) IV Intermittent every 24 hours  cloNIDine 0.1 milliGRAM(s) Oral every 12 hours  clopidogrel Tablet 75 milliGRAM(s) Oral daily  dextrose 5%. 1000 milliLiter(s) (100 mL/Hr) IV Continuous <Continuous>  dextrose 5%. 1000 milliLiter(s) (50 mL/Hr) IV Continuous <Continuous>  dextrose 50% Injectable 25 Gram(s) IV Push once  dextrose 50% Injectable 12.5 Gram(s) IV Push once  dextrose 50% Injectable 25 Gram(s) IV Push once  diltiazem    Tablet 60 milliGRAM(s) Oral every 8 hours  epoetin fawad-epbx (RETACRIT) Injectable 40878 Unit(s) IV Push <User Schedule>  glucagon  Injectable 1 milliGRAM(s) IntraMuscular once  heparin   Injectable 5000 Unit(s) SubCutaneous every 12 hours  imipramine 50 milliGRAM(s) Oral daily  insulin lispro (ADMELOG) corrective regimen sliding scale   SubCutaneous three times a day before meals  insulin lispro (ADMELOG) corrective regimen sliding scale   SubCutaneous at bedtime  lactobacillus acidophilus 1 Tablet(s) Oral two times a day  metoprolol tartrate 100 milliGRAM(s) Oral every 12 hours  multivitamin 1 Tablet(s) Oral daily  pantoprazole    Tablet 40 milliGRAM(s) Oral before breakfast  senna 2 Tablet(s) Oral at bedtime    MEDICATIONS  (PRN):  acetaminophen     Tablet .. 650 milliGRAM(s) Oral every 6 hours PRN Temp greater or equal to 38C (100.4F), Mild Pain (1 - 3)  aluminum hydroxide/magnesium hydroxide/simethicone Suspension 30 milliLiter(s) Oral every 4 hours PRN Dyspepsia  dextrose Oral Gel 15 Gram(s) Oral once PRN Blood Glucose LESS THAN 70 milliGRAM(s)/deciliter  melatonin 3 milliGRAM(s) Oral at bedtime PRN Insomnia  ondansetron Injectable 4 milliGRAM(s) IV Push every 8 hours PRN Nausea and/or Vomiting  traMADol 50 milliGRAM(s) Oral three times a day PRN Moderate Pain (4 - 6)

## 2022-04-23 NOTE — DIETITIAN INITIAL EVALUATION ADULT - REASON FOR ADMISSION
72yo Female with PMH of HTN, ESRD-HD, T2DM, MI, CAD. Pt admitted on 4/22 s/p fall on way to HD missing session. Pt with recent admission for similar reason.

## 2022-04-23 NOTE — DIETITIAN INITIAL EVALUATION ADULT - SIGNS/SYMPTOMS
as evidenced by physical findings of moderate muscle/fat loss  as evidenced by ESRD-HD, multiple pressure injuries

## 2022-04-23 NOTE — PROGRESS NOTE ADULT - SUBJECTIVE AND OBJECTIVE BOX
PROGRESS NOTE  Patient is a 71y old  Female who presents with a chief complaint of 70yo Female with PMH of HTN, ESRD-HD, T2DM, MI, CAD. Pt admitted on 4/22 s/p fall on way to HD missing session. Pt with recent admission for similar reason.    (23 Apr 2022 11:49)    Chart and available morning labs /imaging are reviewed electronically , urgent issues addressed . More information  is being added upon completion of rounds , when more information is collected and management discussed with consultants , medical staff and social service/case management on the floor   OVERNIGHT  No new issues reported by medical staff . All above noted Patient is resting in a bed comfortably . .No distress noted   PT ordered   HPI:  Patient came to ED because she  fell today on her way to dialysis, pt states that she is falling everyday and always feels weak. pt poor historian, missed dialysis today, will need admission for placement. Recent admission 04/05/22 -70yo female bib ems with sob, pt states she has missed the last 2 rounds of dialysis and is due today, and has been having worsening sob, no cough, fever, chills, no chest pain no other complaints .Found to have hyperkalemia Diagnosed with foot infection MRI showed evidence of acute osteomyelitis of L medial malleolus and distal aspect of R 1st toe ,poa . -ID recommended  cefazolin 2g-2g-3g with HD x 4 weeks .Patient was discharged home with home care and iv abx at HD center Palliative care consult requested ,to discuss advance directives and complete MOLST  (22 Apr 2022 16:22)    PAST MEDICAL & SURGICAL HISTORY:  Diabetes mellitus II    HTN (hypertension)    h/o Anxiety attack    Depression    h/o Myocardial infarct 2007    CAD (coronary artery disease)    h/o Hepatitis A 1969  currently resolved, no symptoms    PAD (peripheral artery disease)    Murmur, cardiac    h/o Smoking  quitted 3/2012    CRF (chronic renal failure), unspecified stage    Dialysis patient    HTN (hypertension)    Anemia secondary to renal failure    coronary stent 2007    s/p Ovarian cyst removal    s/p surgical removal of benign Skin lesion epigastric area        MEDICATIONS  (STANDING):  aspirin enteric coated 81 milliGRAM(s) Oral daily  atorvastatin 40 milliGRAM(s) Oral at bedtime  calcium acetate 667 milliGRAM(s) Oral three times a day with meals  ceFAZolin   IVPB 1000 milliGRAM(s) IV Intermittent every 24 hours  cloNIDine 0.1 milliGRAM(s) Oral every 12 hours  clopidogrel Tablet 75 milliGRAM(s) Oral daily  dextrose 5%. 1000 milliLiter(s) (100 mL/Hr) IV Continuous <Continuous>  dextrose 5%. 1000 milliLiter(s) (50 mL/Hr) IV Continuous <Continuous>  dextrose 50% Injectable 25 Gram(s) IV Push once  dextrose 50% Injectable 12.5 Gram(s) IV Push once  dextrose 50% Injectable 25 Gram(s) IV Push once  diltiazem    Tablet 60 milliGRAM(s) Oral every 8 hours  epoetin fawad-epbx (RETACRIT) Injectable 43721 Unit(s) IV Push <User Schedule>  glucagon  Injectable 1 milliGRAM(s) IntraMuscular once  heparin   Injectable 5000 Unit(s) SubCutaneous every 12 hours  imipramine 50 milliGRAM(s) Oral daily  insulin lispro (ADMELOG) corrective regimen sliding scale   SubCutaneous three times a day before meals  insulin lispro (ADMELOG) corrective regimen sliding scale   SubCutaneous at bedtime  lactobacillus acidophilus 1 Tablet(s) Oral two times a day  metoprolol tartrate 100 milliGRAM(s) Oral every 12 hours  multivitamin 1 Tablet(s) Oral daily  pantoprazole    Tablet 40 milliGRAM(s) Oral before breakfast  senna 2 Tablet(s) Oral at bedtime    MEDICATIONS  (PRN):  acetaminophen     Tablet .. 650 milliGRAM(s) Oral every 6 hours PRN Temp greater or equal to 38C (100.4F), Mild Pain (1 - 3)  aluminum hydroxide/magnesium hydroxide/simethicone Suspension 30 milliLiter(s) Oral every 4 hours PRN Dyspepsia  dextrose Oral Gel 15 Gram(s) Oral once PRN Blood Glucose LESS THAN 70 milliGRAM(s)/deciliter  melatonin 3 milliGRAM(s) Oral at bedtime PRN Insomnia  ondansetron Injectable 4 milliGRAM(s) IV Push every 8 hours PRN Nausea and/or Vomiting  traMADol 50 milliGRAM(s) Oral three times a day PRN Moderate Pain (4 - 6)      OBJECTIVE    T(C): 36.8 (04-23-22 @ 12:40), Max: 37.1 (04-22-22 @ 21:20)  HR: 61 (04-23-22 @ 12:40) (58 - 66)  BP: 123/68 (04-23-22 @ 12:40) (123/68 - 143/73)  RR: 18 (04-23-22 @ 12:40) (16 - 19)  SpO2: 92% (04-23-22 @ 12:40) (92% - 100%)  Wt(kg): --  I&O's Summary        REVIEW OF SYSTEMS:  CONSTITUTIONAL: No fever, weight loss, or fatigue  EYES: No eye pain, visual disturbances, or discharge  ENMT:   No sinus or throat pain  NECK: No pain or stiffness  RESPIRATORY: No cough, wheezing, chills or hemoptysis; No shortness of breath  CARDIOVASCULAR: No chest pain, palpitations, dizziness, or leg swelling  GASTROINTESTINAL: No abdominal pain. No nausea, vomiting; No diarrhea or constipation. No melena or hematochezia.  GENITOURINARY: No dysuria, frequency, hematuria, or incontinence  NEUROLOGICAL: No headaches, memory loss, loss of strength, numbness, or tremors  SKIN: No itching, burning, rashes, or lesions   MUSCULOSKELETAL: No joint pain or swelling; No muscle, back, or extremity pain    PHYSICAL EXAM:  Appearance: NAD. VS past 24 hrs -as above   HEENT:   Moist oral mucosa. Conjunctiva clear b/l.   Neck : supple  Respiratory: Lungs CTAB.  Gastrointestinal:  Soft, nontender. No rebound. No rigidity. BS present	  Cardiovascular: RRR ,S1S2 present  Neurologic: Non-focal. Moving all extremities.  Extremities: No edema. No erythema. No calf tenderness.  Skin: No rashes, No ecchymoses, No cyanosis.	  wounds ,skin lesions-See skin assesment flow sheet   LABS:                        9.5    11.24 )-----------( 445      ( 23 Apr 2022 09:21 )             30.3     04-23    134<L>  |  92<L>  |  74<H>  ----------------------------<  161<H>  5.4<H>   |  25  |  8.70<H>    Ca    9.3      23 Apr 2022 09:21    TPro  6.8  /  Alb  2.7<L>  /  TBili  0.4  /  DBili  x   /  AST  22  /  ALT  <6<L>  /  AlkPhos  93  04-23    CAPILLARY BLOOD GLUCOSE      POCT Blood Glucose.: 307 mg/dL (23 Apr 2022 11:59)  POCT Blood Glucose.: 163 mg/dL (23 Apr 2022 07:24)  POCT Blood Glucose.: 177 mg/dL (22 Apr 2022 22:55)  POCT Blood Glucose.: 179 mg/dL (22 Apr 2022 17:30)          RADIOLOGY & ADDITIONAL TESTS:   reviewed elctronically  ASSESSMENT/PLAN: 	  25 minutes aggregate time was spent on this visit, 50% visit time spent in care co-ordination with other attendings and counselling patient .I have discussed care plan with patient / HCP/family member ,who expressed understanding of problems treatment and their effect and side effects, alternatives in details. I have asked if they have any questions and concerns and appropriately addressed them to best of my ability.

## 2022-04-23 NOTE — PROGRESS NOTE ADULT - SUBJECTIVE AND OBJECTIVE BOX
Patient is a 71y Female with a known history of :  Falls [W19.XXXA]    DM (diabetes mellitus) [E11.9]    Acute osteomyelitis [M86.10]    ESRD on dialysis [N18.6]    Anemia secondary to renal failure [N18.9]    Atrial fibrillation [I48.91]    PAD (peripheral artery disease) [I73.9]    Non-compliant patient [Z91.19]    Prophylactic measure [Z29.9]    Need for follow-up by  [Z78.9]      HPI:  Patient came to ED because she  fell today on her way to dialysis, pt states that she is falling everyday and always feels weak. pt poor historian, missed dialysis today, will need admission for placement. Recent admission 04/05/22 -70yo female bib ems with sob, pt states she has missed the last 2 rounds of dialysis and is due today, and has been having worsening sob, no cough, fever, chills, no chest pain no other complaints .Found to have hyperkalemia Diagnosed with foot infection MRI showed evidence of acute osteomyelitis of L medial malleolus and distal aspect of R 1st toe ,poa . -ID recommended  cefazolin 2g-2g-3g with HD x 4 weeks .Patient was discharged home with home care and iv abx at HD center Palliative care consult requested ,to discuss advance directives and complete MOLST  (22 Apr 2022 16:22)      REVIEW OF SYSTEMS:    CONSTITUTIONAL: No fever, weight loss, or fatigue  EYES: No eye pain, visual disturbances, or discharge  ENMT:  No difficulty hearing, tinnitus, vertigo; No sinus or throat pain  NECK: No pain or stiffness  BREASTS: No pain, masses, or nipple discharge  RESPIRATORY: No cough, wheezing, chills or hemoptysis; No shortness of breath  CARDIOVASCULAR: No chest pain, palpitations, dizziness, or leg swelling  GASTROINTESTINAL: No abdominal or epigastric pain. No nausea, vomiting, or hematemesis; No diarrhea or constipation. No melena or hematochezia.  GENITOURINARY: No dysuria, frequency, hematuria, or incontinence  NEUROLOGICAL: No headaches, memory loss, loss of strength, numbness, or tremors  SKIN: No itching, burning, rashes, or lesions   LYMPH NODES: No enlarged glands  ENDOCRINE: No heat or cold intolerance; No hair loss  MUSCULOSKELETAL: No joint pain or swelling; No muscle, back, or extremity pain  PSYCHIATRIC: No depression, anxiety, mood swings, or difficulty sleeping  HEME/LYMPH: No easy bruising, or bleeding gums  ALLERGY AND IMMUNOLOGIC: No hives or eczema    MEDICATIONS  (STANDING):  atorvastatin 40 milliGRAM(s) Oral at bedtime  calcium acetate 667 milliGRAM(s) Oral three times a day with meals  ceFAZolin   IVPB 1000 milliGRAM(s) IV Intermittent every 24 hours  cloNIDine 0.1 milliGRAM(s) Oral every 12 hours  clopidogrel Tablet 75 milliGRAM(s) Oral daily  dextrose 5%. 1000 milliLiter(s) (100 mL/Hr) IV Continuous <Continuous>  dextrose 5%. 1000 milliLiter(s) (50 mL/Hr) IV Continuous <Continuous>  dextrose 50% Injectable 25 Gram(s) IV Push once  dextrose 50% Injectable 12.5 Gram(s) IV Push once  dextrose 50% Injectable 25 Gram(s) IV Push once  diltiazem    Tablet 60 milliGRAM(s) Oral every 8 hours  glucagon  Injectable 1 milliGRAM(s) IntraMuscular once  heparin   Injectable 5000 Unit(s) SubCutaneous every 12 hours  imipramine 50 milliGRAM(s) Oral daily  insulin lispro (ADMELOG) corrective regimen sliding scale   SubCutaneous three times a day before meals  insulin lispro (ADMELOG) corrective regimen sliding scale   SubCutaneous at bedtime  lactobacillus acidophilus 1 Tablet(s) Oral two times a day  metoprolol tartrate 100 milliGRAM(s) Oral every 12 hours  multivitamin 1 Tablet(s) Oral daily  pantoprazole    Tablet 40 milliGRAM(s) Oral before breakfast  senna 2 Tablet(s) Oral at bedtime    MEDICATIONS  (PRN):  acetaminophen     Tablet .. 650 milliGRAM(s) Oral every 6 hours PRN Temp greater or equal to 38C (100.4F), Mild Pain (1 - 3)  aluminum hydroxide/magnesium hydroxide/simethicone Suspension 30 milliLiter(s) Oral every 4 hours PRN Dyspepsia  dextrose Oral Gel 15 Gram(s) Oral once PRN Blood Glucose LESS THAN 70 milliGRAM(s)/deciliter  melatonin 3 milliGRAM(s) Oral at bedtime PRN Insomnia  ondansetron Injectable 4 milliGRAM(s) IV Push every 8 hours PRN Nausea and/or Vomiting  traMADol 50 milliGRAM(s) Oral three times a day PRN Moderate Pain (4 - 6)      ALLERGIES: latex (Unknown)  No Known Drug Allergies      FAMILY HISTORY:      PHYSICAL EXAMINATION:  -----------------------------  T(C): 36.8 (04-23-22 @ 05:08), Max: 37.1 (04-22-22 @ 21:20)  HR: 65 (04-23-22 @ 05:08) (58 - 66)  BP: 130/65 (04-23-22 @ 05:08) (130/65 - 143/73)  RR: 18 (04-23-22 @ 05:08) (16 - 19)  SpO2: 92% (04-23-22 @ 05:08) (92% - 100%)  Wt(kg): --    Height (cm): 175.3 (04-22 @ 10:41)  Weight (kg): 61.2 (04-22 @ 10:41)  BMI (kg/m2): 19.9 (04-22 @ 10:41)  BSA (m2): 1.75 (04-22 @ 10:41)    VITALS  T(C): 36.8 (04-23-22 @ 05:08), Max: 37.1 (04-22-22 @ 21:20)  HR: 65 (04-23-22 @ 05:08) (58 - 66)  BP: 130/65 (04-23-22 @ 05:08) (130/65 - 143/73)  RR: 18 (04-23-22 @ 05:08) (16 - 19)  SpO2: 92% (04-23-22 @ 05:08) (92% - 100%)    Constitutional: well developed, normal appearance, well groomed, well nourished, no deformities and no acute distress.   Eyes: the conjunctiva exhibited no abnormalities and the eyelids demonstrated no xanthelasmas.   HEENT: normal oral mucosa, no oral pallor and no oral cyanosis.   Neck: normal jugular venous A waves present, normal jugular venous V waves present and no jugular venous wilson A waves.   Pulmonary: no respiratory distress, normal respiratory rhythm and effort, no accessory muscle use and lungs were clear to auscultation bilaterally.   Cardiovascular: heart rate and rhythm were normal, normal S1 and S2 and no murmur, gallop, rub, heave or thrill are present.   Abdomen: soft, non-tender, no hepato-splenomegaly and no abdominal mass palpated.   Musculoskeletal: the gait could not be assessed..   Extremities: no clubbing of the fingernails, no localized cyanosis, no petechial hemorrhages and no ischemic changes.   Skin: normal skin color and pigmentation, no rash, no venous stasis, no skin lesions, no skin ulcer and no xanthoma was observed.   Psychiatric: oriented to person, place, and time, the affect was normal, the mood was normal and not feeling anxious.     LABS:   --------  04-22    134<L>  |  95<L>  |  57<H>  ----------------------------<  128<H>  5.3   |  27  |  7.60<H>    Ca    9.4      22 Apr 2022 12:34    TPro  7.0  /  Alb  2.7<L>  /  TBili  0.3  /  DBili  x   /  AST  24  /  ALT  <6<L>  /  AlkPhos  92  04-22                         9.5    14.49 )-----------( 428      ( 22 Apr 2022 12:34 )             29.3                 RADIOLOGY:  -----------------    ECG:     ECHO:

## 2022-04-23 NOTE — DIETITIAN INITIAL EVALUATION ADULT - ETIOLOGY
related to inadequate protein-energy intake and increased physiological demand in the setting of ESRD-HD  related to increased physiological demand

## 2022-04-23 NOTE — PROGRESS NOTE ADULT - SUBJECTIVE AND OBJECTIVE BOX
Patient is a 71y Female whom presented to the hospital with     PAST MEDICAL & SURGICAL HISTORY:  Diabetes mellitus II    HTN (hypertension)    h/o Anxiety attack    Depression    h/o Myocardial infarct 2007    CAD (coronary artery disease)    h/o Hepatitis A 1969  currently resolved, no symptoms    PAD (peripheral artery disease)    Murmur, cardiac    h/o Smoking  quitted 3/2012    CRF (chronic renal failure), unspecified stage    Dialysis patient    Anemia secondary to renal failure    HTN (hypertension)    coronary stent 2007    s/p Ovarian cyst removal    s/p surgical removal of benign Skin lesion epigastric area        MEDICATIONS  (STANDING):  aspirin enteric coated 81 milliGRAM(s) Oral daily  atorvastatin 40 milliGRAM(s) Oral at bedtime  calcium acetate 667 milliGRAM(s) Oral three times a day with meals  ceFAZolin   IVPB 1000 milliGRAM(s) IV Intermittent every 24 hours  cloNIDine 0.1 milliGRAM(s) Oral every 12 hours  clopidogrel Tablet 75 milliGRAM(s) Oral daily  dextrose 5%. 1000 milliLiter(s) (100 mL/Hr) IV Continuous <Continuous>  dextrose 5%. 1000 milliLiter(s) (50 mL/Hr) IV Continuous <Continuous>  dextrose 50% Injectable 25 Gram(s) IV Push once  dextrose 50% Injectable 12.5 Gram(s) IV Push once  dextrose 50% Injectable 25 Gram(s) IV Push once  diltiazem    Tablet 60 milliGRAM(s) Oral every 8 hours  glucagon  Injectable 1 milliGRAM(s) IntraMuscular once  heparin   Injectable 5000 Unit(s) SubCutaneous every 12 hours  imipramine 50 milliGRAM(s) Oral daily  insulin lispro (ADMELOG) corrective regimen sliding scale   SubCutaneous three times a day before meals  insulin lispro (ADMELOG) corrective regimen sliding scale   SubCutaneous at bedtime  lactobacillus acidophilus 1 Tablet(s) Oral two times a day  metoprolol tartrate 100 milliGRAM(s) Oral every 12 hours  multivitamin 1 Tablet(s) Oral daily  pantoprazole    Tablet 40 milliGRAM(s) Oral before breakfast  senna 2 Tablet(s) Oral at bedtime      Allergies    latex (Unknown)  No Known Drug Allergies    Intolerances        SOCIAL HISTORY:  Denies ETOh,Smoking,     FAMILY HISTORY:      REVIEW OF SYSTEMS:    CONSTITUTIONAL: No weakness, fevers or chills  EYES/ENT: No visual changes;  no throat pain   NECK: No pain or stiffness  RESPIRATORY: No cough, wheezing, hemoptysis; No shortness of breath  CARDIOVASCULAR: No chest pain or palpitations  GASTROINTESTINAL: No abdominal or epigastric pain. No nausea, vomiting,     No diarrhea or constipation. No melena   GENITOURINARY: No dysuria, frequency or hematuria  NEUROLOGICAL: No numbness or weakness  SKIN: dry      VITAL:  T(C): , Max: 37.1 (04-22-22 @ 21:20)  T(F): , Max: 98.8 (04-22-22 @ 21:20)  HR: 65 (04-23-22 @ 05:08)  BP: 130/65 (04-23-22 @ 05:08)  BP(mean): --  RR: 18 (04-23-22 @ 05:08)  SpO2: 92% (04-23-22 @ 05:08)  Wt(kg): --    I and O's:        PHYSICAL EXAM:    Constitutional: NAD  HEENT: conjunctive   clear   Neck:  No JVD  Respiratory: CTAB  Cardiovascular: S1 and S2  Gastrointestinal: BS+, soft, NT/ND  Extremities: No peripheral edema  Neurological: A/O x 3, no focal deficits  Psychiatric: Normal mood, normal affect  : No Renteria  Skin: No rashes  Access: Not applicable    LABS:                        9.5    11.24 )-----------( 445      ( 23 Apr 2022 09:21 )             30.3     04-23    134<L>  |  92<L>  |  74<H>  ----------------------------<  161<H>  5.4<H>   |  25  |  8.70<H>    Ca    9.3      23 Apr 2022 09:21    TPro  6.8  /  Alb  2.7<L>  /  TBili  0.4  /  DBili  x   /  AST  22  /  ALT  <6<L>  /  AlkPhos  93  04-23      Urine Studies:          RADIOLOGY & ADDITIONAL STUDIES:                   Patient is a 71y Female whom presented to the hospital with esrd on hd    PAST MEDICAL & SURGICAL HISTORY:  Diabetes mellitus II    HTN (hypertension)    h/o Anxiety attack    Depression    h/o Myocardial infarct 2007    CAD (coronary artery disease)    h/o Hepatitis A 1969  currently resolved, no symptoms    PAD (peripheral artery disease)    Murmur, cardiac    h/o Smoking  quitted 3/2012    CRF (chronic renal failure), unspecified stage    Dialysis patient    Anemia secondary to renal failure    HTN (hypertension)    coronary stent 2007    s/p Ovarian cyst removal    s/p surgical removal of benign Skin lesion epigastric area        MEDICATIONS  (STANDING):  aspirin enteric coated 81 milliGRAM(s) Oral daily  atorvastatin 40 milliGRAM(s) Oral at bedtime  calcium acetate 667 milliGRAM(s) Oral three times a day with meals  ceFAZolin   IVPB 1000 milliGRAM(s) IV Intermittent every 24 hours  cloNIDine 0.1 milliGRAM(s) Oral every 12 hours  clopidogrel Tablet 75 milliGRAM(s) Oral daily  dextrose 5%. 1000 milliLiter(s) (100 mL/Hr) IV Continuous <Continuous>  dextrose 5%. 1000 milliLiter(s) (50 mL/Hr) IV Continuous <Continuous>  dextrose 50% Injectable 25 Gram(s) IV Push once  dextrose 50% Injectable 12.5 Gram(s) IV Push once  dextrose 50% Injectable 25 Gram(s) IV Push once  diltiazem    Tablet 60 milliGRAM(s) Oral every 8 hours  glucagon  Injectable 1 milliGRAM(s) IntraMuscular once  heparin   Injectable 5000 Unit(s) SubCutaneous every 12 hours  imipramine 50 milliGRAM(s) Oral daily  insulin lispro (ADMELOG) corrective regimen sliding scale   SubCutaneous three times a day before meals  insulin lispro (ADMELOG) corrective regimen sliding scale   SubCutaneous at bedtime  lactobacillus acidophilus 1 Tablet(s) Oral two times a day  metoprolol tartrate 100 milliGRAM(s) Oral every 12 hours  multivitamin 1 Tablet(s) Oral daily  pantoprazole    Tablet 40 milliGRAM(s) Oral before breakfast  senna 2 Tablet(s) Oral at bedtime      Allergies    latex (Unknown)  No Known Drug Allergies    Intolerances        SOCIAL HISTORY:  Denies ETOh,Smoking,     FAMILY HISTORY:      REVIEW OF SYSTEMS:    CONSTITUTIONAL: No weakness, fevers or chills  RESPIRATORY: No cough, wheezing, hemoptysis; No shortness of breath  CARDIOVASCULAR: No chest pain or palpitations  GASTROINTESTINAL: No abdominal or epigastric pain. No nausea, vomiting,     No diarrhea or constipation. No melena   GENITOURINARY: No dysuria, frequency or hematuria  NEUROLOGICAL: No numbness or weakness  SKIN: dry      VITAL:  T(C): , Max: 37.1 (04-22-22 @ 21:20)  T(F): , Max: 98.8 (04-22-22 @ 21:20)  HR: 65 (04-23-22 @ 05:08)  BP: 130/65 (04-23-22 @ 05:08)  BP(mean): --  RR: 18 (04-23-22 @ 05:08)  SpO2: 92% (04-23-22 @ 05:08)  Wt(kg): --    I and O's:        PHYSICAL EXAM:    Constitutional: NAD  HEENT: conjunctive   clear   Neck:  No JVD  Respiratory: CTAB  Cardiovascular: S1 and S2  Gastrointestinal: BS+, soft, NT/ND  Extremities: No peripheral edema  Neurological: A/O x 3, no focal deficits  Psychiatric: Normal mood, normal affect  : No Renteria  Skin: dry   Access: Not applicable    LABS:                        9.5    11.24 )-----------( 445      ( 23 Apr 2022 09:21 )             30.3     04-23    134<L>  |  92<L>  |  74<H>  ----------------------------<  161<H>  5.4<H>   |  25  |  8.70<H>    Ca    9.3      23 Apr 2022 09:21    TPro  6.8  /  Alb  2.7<L>  /  TBili  0.4  /  DBili  x   /  AST  22  /  ALT  <6<L>  /  AlkPhos  93  04-23      Urine Studies:          RADIOLOGY & ADDITIONAL STUDIES:

## 2022-04-24 RX ADMIN — Medication 60 MILLIGRAM(S): at 13:56

## 2022-04-24 RX ADMIN — Medication 60 MILLIGRAM(S): at 05:13

## 2022-04-24 RX ADMIN — Medication 667 MILLIGRAM(S): at 08:08

## 2022-04-24 RX ADMIN — PANTOPRAZOLE SODIUM 40 MILLIGRAM(S): 20 TABLET, DELAYED RELEASE ORAL at 05:13

## 2022-04-24 RX ADMIN — Medication 0.1 MILLIGRAM(S): at 05:13

## 2022-04-24 RX ADMIN — Medication 100 MILLIGRAM(S): at 05:13

## 2022-04-24 RX ADMIN — Medication 60 MILLIGRAM(S): at 21:00

## 2022-04-24 RX ADMIN — HEPARIN SODIUM 5000 UNIT(S): 5000 INJECTION INTRAVENOUS; SUBCUTANEOUS at 08:07

## 2022-04-24 RX ADMIN — Medication 100 MILLIGRAM(S): at 17:12

## 2022-04-24 RX ADMIN — Medication 3: at 08:08

## 2022-04-24 RX ADMIN — Medication 100 MILLIGRAM(S): at 18:38

## 2022-04-24 RX ADMIN — Medication 1 TABLET(S): at 12:19

## 2022-04-24 RX ADMIN — Medication 81 MILLIGRAM(S): at 12:19

## 2022-04-24 RX ADMIN — Medication 5: at 12:18

## 2022-04-24 RX ADMIN — Medication 667 MILLIGRAM(S): at 12:19

## 2022-04-24 RX ADMIN — CLOPIDOGREL BISULFATE 75 MILLIGRAM(S): 75 TABLET, FILM COATED ORAL at 12:18

## 2022-04-24 RX ADMIN — Medication 667 MILLIGRAM(S): at 17:12

## 2022-04-24 RX ADMIN — Medication 3 MILLIGRAM(S): at 21:04

## 2022-04-24 RX ADMIN — Medication 50 MILLIGRAM(S): at 12:18

## 2022-04-24 RX ADMIN — Medication 3: at 21:12

## 2022-04-24 RX ADMIN — Medication 1 TABLET(S): at 05:13

## 2022-04-24 RX ADMIN — Medication 1: at 17:11

## 2022-04-24 RX ADMIN — Medication 0.1 MILLIGRAM(S): at 17:12

## 2022-04-24 RX ADMIN — ATORVASTATIN CALCIUM 40 MILLIGRAM(S): 80 TABLET, FILM COATED ORAL at 21:00

## 2022-04-24 RX ADMIN — HEPARIN SODIUM 5000 UNIT(S): 5000 INJECTION INTRAVENOUS; SUBCUTANEOUS at 19:49

## 2022-04-24 RX ADMIN — SENNA PLUS 2 TABLET(S): 8.6 TABLET ORAL at 21:00

## 2022-04-24 RX ADMIN — Medication 1 TABLET(S): at 17:12

## 2022-04-24 NOTE — PROGRESS NOTE ADULT - SUBJECTIVE AND OBJECTIVE BOX
Patient is a 71y Female with a known history of :  Falls [W19.XXXA]    DM (diabetes mellitus) [E11.9]    Acute osteomyelitis [M86.10]    ESRD on dialysis [N18.6]    Anemia secondary to renal failure [N18.9]    Atrial fibrillation [I48.91]    PAD (peripheral artery disease) [I73.9]    Non-compliant patient [Z91.19]    Prophylactic measure [Z29.9]    Need for follow-up by  [Z78.9]    Gangrenous toe [I96]    Chronic osteomyelitis [M86.60]      HPI:  Patient came to ED because she  fell today on her way to dialysis, pt states that she is falling everyday and always feels weak. pt poor historian, missed dialysis today, will need admission for placement. Recent admission 04/05/22 -72yo female bib ems with sob, pt states she has missed the last 2 rounds of dialysis and is due today, and has been having worsening sob, no cough, fever, chills, no chest pain no other complaints .Found to have hyperkalemia Diagnosed with foot infection MRI showed evidence of acute osteomyelitis of L medial malleolus and distal aspect of R 1st toe ,poa . -ID recommended  cefazolin 2g-2g-3g with HD x 4 weeks .Patient was discharged home with home care and iv abx at HD center Palliative care consult requested ,to discuss advance directives and complete MOLST  (22 Apr 2022 16:22)      REVIEW OF SYSTEMS:    CONSTITUTIONAL: No fever, weight loss, or fatigue  EYES: No eye pain, visual disturbances, or discharge  ENMT:  No difficulty hearing, tinnitus, vertigo; No sinus or throat pain  NECK: No pain or stiffness  BREASTS: No pain, masses, or nipple discharge  RESPIRATORY: No cough, wheezing, chills or hemoptysis; No shortness of breath  CARDIOVASCULAR: No chest pain, palpitations, dizziness, or leg swelling  GASTROINTESTINAL: No abdominal or epigastric pain. No nausea, vomiting, or hematemesis; No diarrhea or constipation. No melena or hematochezia.  GENITOURINARY: No dysuria, frequency, hematuria, or incontinence  NEUROLOGICAL: No headaches, memory loss, loss of strength, numbness, or tremors  SKIN: No itching, burning, rashes, or lesions   LYMPH NODES: No enlarged glands  ENDOCRINE: No heat or cold intolerance; No hair loss  MUSCULOSKELETAL: No joint pain or swelling; No muscle, back, or extremity pain  PSYCHIATRIC: No depression, anxiety, mood swings, or difficulty sleeping  HEME/LYMPH: No easy bruising, or bleeding gums  ALLERGY AND IMMUNOLOGIC: No hives or eczema    MEDICATIONS  (STANDING):  aspirin enteric coated 81 milliGRAM(s) Oral daily  atorvastatin 40 milliGRAM(s) Oral at bedtime  calcium acetate 667 milliGRAM(s) Oral three times a day with meals  ceFAZolin   IVPB 1000 milliGRAM(s) IV Intermittent every 24 hours  cloNIDine 0.1 milliGRAM(s) Oral every 12 hours  clopidogrel Tablet 75 milliGRAM(s) Oral daily  dextrose 5%. 1000 milliLiter(s) (100 mL/Hr) IV Continuous <Continuous>  dextrose 5%. 1000 milliLiter(s) (50 mL/Hr) IV Continuous <Continuous>  dextrose 50% Injectable 25 Gram(s) IV Push once  dextrose 50% Injectable 12.5 Gram(s) IV Push once  dextrose 50% Injectable 25 Gram(s) IV Push once  diltiazem    Tablet 60 milliGRAM(s) Oral every 8 hours  epoetin [id number] (RETACRIT) Injectable 50320 Unit(s) IV Push <User Schedule>  glucagon  Injectable 1 milliGRAM(s) IntraMuscular once  heparin   Injectable 5000 Unit(s) SubCutaneous every 12 hours  imipramine 50 milliGRAM(s) Oral daily  insulin lispro (ADMELOG) corrective regimen sliding scale   SubCutaneous three times a day before meals  insulin lispro (ADMELOG) corrective regimen sliding scale   SubCutaneous at bedtime  lactobacillus acidophilus 1 Tablet(s) Oral two times a day  metoprolol tartrate 100 milliGRAM(s) Oral every 12 hours  multivitamin 1 Tablet(s) Oral daily  pantoprazole    Tablet 40 milliGRAM(s) Oral before breakfast  senna 2 Tablet(s) Oral at bedtime    MEDICATIONS  (PRN):  acetaminophen     Tablet .. 650 milliGRAM(s) Oral every 6 hours PRN Temp greater or equal to 38C (100.4F), Mild Pain (1 - 3)  aluminum hydroxide/magnesium hydroxide/simethicone Suspension 30 milliLiter(s) Oral every 4 hours PRN Dyspepsia  dextrose Oral Gel 15 Gram(s) Oral once PRN Blood Glucose LESS THAN 70 milliGRAM(s)/deciliter  melatonin 3 milliGRAM(s) Oral at bedtime PRN Insomnia  ondansetron Injectable 4 milliGRAM(s) IV Push every 8 hours PRN Nausea and/or Vomiting  traMADol 50 milliGRAM(s) Oral three times a day PRN Moderate Pain (4 - 6)      ALLERGIES: latex (Unknown)  No Known Drug Allergies      FAMILY HISTORY:      PHYSICAL EXAMINATION:  -----------------------------  T(C): 37 (04-24-22 @ 04:52), Max: 37.4 (04-23-22 @ 20:49)  HR: 72 (04-24-22 @ 04:52) (61 - 75)  BP: 160/61 (04-24-22 @ 04:52) (123/68 - 162/63)  RR: 16 (04-24-22 @ 04:52) (16 - 18)  SpO2: 94% (04-24-22 @ 04:52) (92% - 100%)  Wt(kg): --    04-23 @ 07:01  -  04-24 @ 07:00  --------------------------------------------------------  IN:    Other (mL): 900 mL  Total IN: 900 mL    OUT:    Other (mL): 2900 mL  Total OUT: 2900 mL    Total NET: -2000 mL            VITALS  T(C): 37 (04-24-22 @ 04:52), Max: 37.4 (04-23-22 @ 20:49)  HR: 72 (04-24-22 @ 04:52) (61 - 75)  BP: 160/61 (04-24-22 @ 04:52) (123/68 - 162/63)  RR: 16 (04-24-22 @ 04:52) (16 - 18)  SpO2: 94% (04-24-22 @ 04:52) (92% - 100%)    Constitutional: well developed, normal appearance, well groomed, well nourished, no deformities and no acute distress.   Eyes: the conjunctiva exhibited no abnormalities and the eyelids demonstrated no xanthelasmas.   HEENT: normal oral mucosa, no oral pallor and no oral cyanosis.   Neck: normal jugular venous A waves present, normal jugular venous V waves present and no jugular venous wilson A waves.   Pulmonary: no respiratory distress, normal respiratory rhythm and effort, no accessory muscle use and lungs were clear to auscultation bilaterally.   Cardiovascular: heart rate and rhythm were normal, normal S1 and S2 and no murmur, gallop, rub, heave or thrill are present.   Abdomen: soft, non-tender, no hepato-splenomegaly and no abdominal mass palpated.   Musculoskeletal: the gait could not be assessed..   Extremities: no clubbing of the fingernails, no localized cyanosis, no petechial hemorrhages and no ischemic changes.   Skin: normal skin color and pigmentation, no rash, no venous stasis, no skin lesions, no skin ulcer and no xanthoma was observed.   Psychiatric: oriented to person, place, and time, the affect was normal, the mood was normal and not feeling anxious.     LABS:   --------  04-23    134<L>  |  92<L>  |  74<H>  ----------------------------<  161<H>  5.4<H>   |  25  |  8.70<H>    Ca    9.3      23 Apr 2022 09:21    TPro  6.8  /  Alb  2.7<L>  /  TBili  0.4  /  DBili  x   /  AST  22  /  ALT  <6<L>  /  AlkPhos  93  04-23                         9.5    11.24 )-----------( 445      ( 23 Apr 2022 09:21 )             30.3             Culture Results:   No growth to date. (04-22 @ 18:38)  Culture Results:   No growth to date. (04-22 @ 18:38)      RADIOLOGY:  -----------------    ECG:     ECHO:

## 2022-04-24 NOTE — PROGRESS NOTE ADULT - SUBJECTIVE AND OBJECTIVE BOX
Neurology follow up note    SHILO [id number]      Interval History:    Patient feels ok no new complaints.    Allergies    latex (Unknown)  No Known Drug Allergies    Intolerances        MEDICATIONS    acetaminophen     Tablet .. 650 milliGRAM(s) Oral every 6 hours PRN  aluminum hydroxide/magnesium hydroxide/simethicone Suspension 30 milliLiter(s) Oral every 4 hours PRN  aspirin enteric coated 81 milliGRAM(s) Oral daily  atorvastatin 40 milliGRAM(s) Oral at bedtime  calcium acetate 667 milliGRAM(s) Oral three times a day with meals  ceFAZolin   IVPB 1000 milliGRAM(s) IV Intermittent every 24 hours  cloNIDine 0.1 milliGRAM(s) Oral every 12 hours  clopidogrel Tablet 75 milliGRAM(s) Oral daily  dextrose 5%. 1000 milliLiter(s) IV Continuous <Continuous>  dextrose 5%. 1000 milliLiter(s) IV Continuous <Continuous>  dextrose 50% Injectable 25 Gram(s) IV Push once  dextrose 50% Injectable 12.5 Gram(s) IV Push once  dextrose 50% Injectable 25 Gram(s) IV Push once  dextrose Oral Gel 15 Gram(s) Oral once PRN  diltiazem    Tablet 60 milliGRAM(s) Oral every 8 hours  epoetin fawad-epbx (RETACRIT) Injectable 90725 Unit(s) IV Push <User Schedule>  glucagon  Injectable 1 milliGRAM(s) IntraMuscular once  heparin   Injectable 5000 Unit(s) SubCutaneous every 12 hours  imipramine 50 milliGRAM(s) Oral daily  insulin lispro (ADMELOG) corrective regimen sliding scale   SubCutaneous three times a day before meals  insulin lispro (ADMELOG) corrective regimen sliding scale   SubCutaneous at bedtime  lactobacillus acidophilus 1 Tablet(s) Oral two times a day  melatonin 3 milliGRAM(s) Oral at bedtime PRN  metoprolol tartrate 100 milliGRAM(s) Oral every 12 hours  multivitamin 1 Tablet(s) Oral daily  ondansetron Injectable 4 milliGRAM(s) IV Push every 8 hours PRN  pantoprazole    Tablet 40 milliGRAM(s) Oral before breakfast  senna 2 Tablet(s) Oral at bedtime  traMADol 50 milliGRAM(s) Oral three times a day PRN              Vital Signs Last 24 Hrs  T(C): 37 (24 Apr 2022 04:52), Max: 37.4 (23 Apr 2022 20:49)  T(F): 98.6 (24 Apr 2022 04:52), Max: 99.4 (23 Apr 2022 20:49)  HR: 72 (24 Apr 2022 04:52) (61 - 75)  BP: 160/61 (24 Apr 2022 04:52) (123/68 - 162/63)  BP(mean): --  RR: 16 (24 Apr 2022 04:52) (16 - 18)  SpO2: 94% (24 Apr 2022 04:52) (92% - 100%)      REVIEW OF SYSTEMS:  Constitutional:  No fever, chills, or night sweats.  Head:  No headaches.  Eyes:  No double vision or blurry vision.  Ears: No ringing in the ears.  Neck:  No neck pain.  Cardiovascular:  No chest pain.  Respiratory:  No shortness of breath.  Abdomen:  No nausea, vomiting, or abdominal pain.  Extremities/Neurological:  Positive numbness and tingling in bilateral feet.  Spine:  No history of back pain.    PHYSICAL EXAMINATION:    HEENT:  Head:  Normocephalic, atraumatic.  Eyes:  No scleral icterus.  Ears:  Hearing bilaterally intact.  NECK:  Supple.  RESPIRATORY:  Good air entry bilaterally.  CARDIOVASCULAR:  S1 and S2 heard.  ABDOMEN:  Soft, nontender.  EXTREMITIES:  No clubbing or cyanosis was noted.  Positive sign of scars and blood on the bottom of her feet from scrapes.      NEUROLOGIC:  The patient is awake and alert.  Extraocular movements were intact.  Upon conversing with the patient, at times, she may be slightly forgetful, but as per my conversation with the spouse, he has noticed that lately as well.  Speech was fluent.  Smile was symmetric.  Full visual fields.  Motor:  Bilateral upper 5/5.  Bilateral lower 4/5.  The patient had markedly impaired proprioception in bilateral lower extremities.  Knee jerks bilaterally were trace, suspect this could be secondary to diabetes and possibly peripheral neuropathy.                 LABS:  CBC Full  -  ( 23 Apr 2022 09:21 )  WBC Count : 11.24 K/uL  RBC Count : 3.08 M/uL  Hemoglobin : 9.5 g/dL  Hematocrit : 30.3 %  Platelet Count - Automated : 445 K/uL  Mean Cell Volume : 98.4 fl  Mean Cell Hemoglobin : 30.8 pg  Mean Cell Hemoglobin Concentration : 31.4 gm/dL  Auto Neutrophil # : 8.30 K/uL  Auto Lymphocyte # : 1.06 K/uL  Auto Monocyte # : 1.46 K/uL  Auto Eosinophil # : 0.20 K/uL  Auto Basophil # : 0.08 K/uL  Auto Neutrophil % : 73.9 %  Auto Lymphocyte % : 9.4 %  Auto Monocyte % : 13.0 %  Auto Eosinophil % : 1.8 %  Auto Basophil % : 0.7 %      04-23    134<L>  |  92<L>  |  74<H>  ----------------------------<  161<H>  5.4<H>   |  25  |  8.70<H>    Ca    9.3      23 Apr 2022 09:21    TPro  6.8  /  Alb  2.7<L>  /  TBili  0.4  /  DBili  x   /  AST  22  /  ALT  <6<L>  /  AlkPhos  93  04-23    Hemoglobin A1C:     LIVER FUNCTIONS - ( 23 Apr 2022 09:21 )  Alb: 2.7 g/dL / Pro: 6.8 g/dL / ALK PHOS: 93 U/L / ALT: <6 U/L / AST: 22 U/L / GGT: x           Vitamin B12 Vitamin B12, Serum: 890 pg/mL (04-23 @ 13:05)          RADIOLOGY      ANALYSIS AND PLAN:  This is a 71-year-old with a history of difficulty ambulating, ataxia, and falls.  For ataxia and falls, suspect this is multifactorial secondary to diabetes and end-stage kidney disease and deconditioning, age-related changes leading to peripheral neuropathy type of component as well.  Suspect less likely this is a primary central nervous system event secondary to as per the patient and her spouse both legs become weak.  This points toward more a peripheral type of etiology.  Would recommend physical therapy evaluation.  Fall precaution.  For history of diabetes, strict control of blood sugars.  For history of hypertension, monitor systolic blood pressure.  For hyperlipidemia, continue the patient on statin.  The patient appears to be on multiple blood thinning medications, would recommend risks versus benefits must be weighed in regards to the patient's history of falls.  For possibly mild cognitive impairment  do to high risk of fall medical team noted AC jade on antiplatelets     Spoke with spouse, Geoffrey, at [phone] 4/24 today and the patient in great detail.  She will need rehab which she wants to go to    Greater than 40 minutes of time was spent with the patient, plan of care, reviewing data, with greater than 50% of the visit was spent counseling and/or coordinating care with multidisciplinary healthcare team

## 2022-04-24 NOTE — PROGRESS NOTE ADULT - SUBJECTIVE AND OBJECTIVE BOX
Patient is a 71y Female whom presented to the hospital with esrd on hd    PAST MEDICAL & SURGICAL HISTORY:  Diabetes mellitus II    HTN (hypertension)    h/o Anxiety attack    Depression    h/o Myocardial infarct 2007    CAD (coronary artery disease)    h/o Hepatitis A 1969  currently resolved, no symptoms    PAD (peripheral artery disease)    Murmur, cardiac    h/o Smoking  quitted 3/2012    CRF (chronic renal failure), unspecified stage    Dialysis patient    Anemia secondary to renal failure    HTN (hypertension)    coronary stent 2007    s/p Ovarian cyst removal    s/p surgical removal of benign Skin lesion epigastric area        MEDICATIONS  (STANDING):  aspirin enteric coated 81 milliGRAM(s) Oral daily  atorvastatin 40 milliGRAM(s) Oral at bedtime  calcium acetate 667 milliGRAM(s) Oral three times a day with meals  ceFAZolin   IVPB 1000 milliGRAM(s) IV Intermittent every 24 hours  cloNIDine 0.1 milliGRAM(s) Oral every 12 hours  clopidogrel Tablet 75 milliGRAM(s) Oral daily  dextrose 5%. 1000 milliLiter(s) (100 mL/Hr) IV Continuous <Continuous>  dextrose 5%. 1000 milliLiter(s) (50 mL/Hr) IV Continuous <Continuous>  dextrose 50% Injectable 25 Gram(s) IV Push once  dextrose 50% Injectable 12.5 Gram(s) IV Push once  dextrose 50% Injectable 25 Gram(s) IV Push once  diltiazem    Tablet 60 milliGRAM(s) Oral every 8 hours  glucagon  Injectable 1 milliGRAM(s) IntraMuscular once  heparin   Injectable 5000 Unit(s) SubCutaneous every 12 hours  imipramine 50 milliGRAM(s) Oral daily  insulin lispro (ADMELOG) corrective regimen sliding scale   SubCutaneous three times a day before meals  insulin lispro (ADMELOG) corrective regimen sliding scale   SubCutaneous at bedtime  lactobacillus acidophilus 1 Tablet(s) Oral two times a day  metoprolol tartrate 100 milliGRAM(s) Oral every 12 hours  multivitamin 1 Tablet(s) Oral daily  pantoprazole    Tablet 40 milliGRAM(s) Oral before breakfast  senna 2 Tablet(s) Oral at bedtime      Allergies    latex (Unknown)  No Known Drug Allergies    Intolerances        SOCIAL HISTORY:  Denies ETOh,Smoking,     FAMILY HISTORY:      REVIEW OF SYSTEMS:    CONSTITUTIONAL: No weakness, fevers or chills  RESPIRATORY: No cough, wheezing, hemoptysis; No shortness of breath  CARDIOVASCULAR: No chest pain or palpitations  GASTROINTESTINAL: No abdominal or epigastric pain. No nausea, vomiting,                                 9.5    11.24 )-----------( 445      ( 23 Apr 2022 09:21 )             30.3       CBC Full  -  ( 23 Apr 2022 09:21 )  WBC Count : 11.24 K/uL  RBC Count : 3.08 M/uL  Hemoglobin : 9.5 g/dL  Hematocrit : 30.3 %  Platelet Count - Automated : 445 K/uL  Mean Cell Volume : 98.4 fl  Mean Cell Hemoglobin : 30.8 pg  Mean Cell Hemoglobin Concentration : 31.4 gm/dL  Auto Neutrophil # : 8.30 K/uL  Auto Lymphocyte # : 1.06 K/uL  Auto Monocyte # : 1.46 K/uL  Auto Eosinophil # : 0.20 K/uL  Auto Basophil # : 0.08 K/uL  Auto Neutrophil % : 73.9 %  Auto Lymphocyte % : 9.4 %  Auto Monocyte % : 13.0 %  Auto Eosinophil % : 1.8 %  Auto Basophil % : 0.7 %      04-23    134<L>  |  92<L>  |  74<H>  ----------------------------<  161<H>  5.4<H>   |  25  |  8.70<H>    Ca    9.3      23 Apr 2022 09:21    TPro  6.8  /  Alb  2.7<L>  /  TBili  0.4  /  DBili  x   /  AST  22  /  ALT  <6<L>  /  AlkPhos  93  04-23      CAPILLARY BLOOD GLUCOSE      POCT Blood Glucose.: 185 mg/dL (24 Apr 2022 16:42)  POCT Blood Glucose.: 362 mg/dL (24 Apr 2022 12:03)  POCT Blood Glucose.: 258 mg/dL (24 Apr 2022 07:56)  POCT Blood Glucose.: 232 mg/dL (24 Apr 2022 00:05)  POCT Blood Glucose.: 184 mg/dL (23 Apr 2022 21:06)  POCT Blood Glucose.: 118 mg/dL (23 Apr 2022 18:38)      Vital Signs Last 24 Hrs  T(C): 36.7 (24 Apr 2022 11:44), Max: 37.4 (23 Apr 2022 20:49)  T(F): 98 (24 Apr 2022 11:44), Max: 99.4 (23 Apr 2022 20:49)  HR: 62 (24 Apr 2022 11:44) (62 - 75)  BP: 128/51 (24 Apr 2022 11:44) (128/51 - 162/63)  BP(mean): --  RR: 18 (24 Apr 2022 11:44) (16 - 18)  SpO2: 90% (24 Apr 2022 11:44) (90% - 100%)              PHYSICAL EXAM:    Constitutional: NAD  HEENT: conjunctive   clear   Neck:  No JVD  Respiratory: CTAB  Cardiovascular: S1 and S2  Gastrointestinal: BS+, soft, NT/ND  Extremities: No peripheral edema  Neurological: A/O x 3, no focal deficits

## 2022-04-24 NOTE — PROGRESS NOTE ADULT - SUBJECTIVE AND OBJECTIVE BOX
Date/Time Patient Seen:  		  Referring MD:   Data Reviewed	       Patient is a 71y old  Female who presents with a chief complaint of 72yo Female with PMH of HTN, ESRD-HD, T2DM, MI, CAD. Pt admitted on 4/22 s/p fall on way to HD missing session. Pt with recent admission for similar reason.    (23 Apr 2022 11:49)      Subjective/HPI     PAST MEDICAL & SURGICAL HISTORY:  Diabetes mellitus II    HTN (hypertension)    h/o Anxiety attack    Depression    h/o Myocardial infarct 2007    CAD (coronary artery disease)    CAD (coronary artery disease)    h/o Hepatitis A 1969  currently resolved, no symptoms    PAD (peripheral artery disease)    Murmur, cardiac    h/o Smoking  quitted 3/2012    CRF (chronic renal failure), unspecified stage    Dialysis patient    Anemia secondary to renal failure    HTN (hypertension)    coronary stent 2007    s/p Ovarian cyst removal    s/p surgical removal of benign Skin lesion epigastric area          Medication list         MEDICATIONS  (STANDING):  aspirin enteric coated 81 milliGRAM(s) Oral daily  atorvastatin 40 milliGRAM(s) Oral at bedtime  calcium acetate 667 milliGRAM(s) Oral three times a day with meals  ceFAZolin   IVPB 1000 milliGRAM(s) IV Intermittent every 24 hours  cloNIDine 0.1 milliGRAM(s) Oral every 12 hours  clopidogrel Tablet 75 milliGRAM(s) Oral daily  dextrose 5%. 1000 milliLiter(s) (100 mL/Hr) IV Continuous <Continuous>  dextrose 5%. 1000 milliLiter(s) (50 mL/Hr) IV Continuous <Continuous>  dextrose 50% Injectable 25 Gram(s) IV Push once  dextrose 50% Injectable 12.5 Gram(s) IV Push once  dextrose 50% Injectable 25 Gram(s) IV Push once  diltiazem    Tablet 60 milliGRAM(s) Oral every 8 hours  epoetin fawad-epbx (RETACRIT) Injectable 96060 Unit(s) IV Push <User Schedule>  glucagon  Injectable 1 milliGRAM(s) IntraMuscular once  heparin   Injectable 5000 Unit(s) SubCutaneous every 12 hours  imipramine 50 milliGRAM(s) Oral daily  insulin lispro (ADMELOG) corrective regimen sliding scale   SubCutaneous three times a day before meals  insulin lispro (ADMELOG) corrective regimen sliding scale   SubCutaneous at bedtime  lactobacillus acidophilus 1 Tablet(s) Oral two times a day  metoprolol tartrate 100 milliGRAM(s) Oral every 12 hours  multivitamin 1 Tablet(s) Oral daily  pantoprazole    Tablet 40 milliGRAM(s) Oral before breakfast  senna 2 Tablet(s) Oral at bedtime    MEDICATIONS  (PRN):  acetaminophen     Tablet .. 650 milliGRAM(s) Oral every 6 hours PRN Temp greater or equal to 38C (100.4F), Mild Pain (1 - 3)  aluminum hydroxide/magnesium hydroxide/simethicone Suspension 30 milliLiter(s) Oral every 4 hours PRN Dyspepsia  dextrose Oral Gel 15 Gram(s) Oral once PRN Blood Glucose LESS THAN 70 milliGRAM(s)/deciliter  melatonin 3 milliGRAM(s) Oral at bedtime PRN Insomnia  ondansetron Injectable 4 milliGRAM(s) IV Push every 8 hours PRN Nausea and/or Vomiting  traMADol 50 milliGRAM(s) Oral three times a day PRN Moderate Pain (4 - 6)         Vitals log        ICU Vital Signs Last 24 Hrs  T(C): 37 (24 Apr 2022 04:52), Max: 37.4 (23 Apr 2022 20:49)  T(F): 98.6 (24 Apr 2022 04:52), Max: 99.4 (23 Apr 2022 20:49)  HR: 72 (24 Apr 2022 04:52) (61 - 75)  BP: 160/61 (24 Apr 2022 04:52) (123/68 - 162/63)  BP(mean): --  ABP: --  ABP(mean): --  RR: 16 (24 Apr 2022 04:52) (16 - 18)  SpO2: 94% (24 Apr 2022 04:52) (92% - 100%)           Input and Output:  I&O's Detail    23 Apr 2022 07:01  -  24 Apr 2022 05:15  --------------------------------------------------------  IN:    Other (mL): 900 mL  Total IN: 900 mL    OUT:    Other (mL): 2900 mL  Total OUT: 2900 mL    Total NET: -2000 mL          Lab Data                        9.5    11.24 )-----------( 445      ( 23 Apr 2022 09:21 )             30.3     04-23    134<L>  |  92<L>  |  74<H>  ----------------------------<  161<H>  5.4<H>   |  25  |  8.70<H>    Ca    9.3      23 Apr 2022 09:21    TPro  6.8  /  Alb  2.7<L>  /  TBili  0.4  /  DBili  x   /  AST  22  /  ALT  <6<L>  /  AlkPhos  93  04-23            Review of Systems	      Objective     Physical Examination    heart s1s2  lung dc BS  abd soft      Pertinent Lab findings & Imaging      Dexter:  NO   Adequate UO     I&O's Detail    23 Apr 2022 07:01  -  24 Apr 2022 05:15  --------------------------------------------------------  IN:    Other (mL): 900 mL  Total IN: 900 mL    OUT:    Other (mL): 2900 mL  Total OUT: 2900 mL    Total NET: -2000 mL               Discussed with:     Cultures:	        Radiology

## 2022-04-25 ENCOUNTER — APPOINTMENT (OUTPATIENT)
Dept: WOUND CARE | Facility: HOSPITAL | Age: 72
End: 2022-04-25

## 2022-04-25 LAB
ANION GAP SERPL CALC-SCNC: 10 MMOL/L — SIGNIFICANT CHANGE UP (ref 5–17)
BUN SERPL-MCNC: 65 MG/DL — HIGH (ref 7–23)
CALCIUM SERPL-MCNC: 9.8 MG/DL — SIGNIFICANT CHANGE UP (ref 8.5–10.1)
CHLORIDE SERPL-SCNC: 93 MMOL/L — LOW (ref 96–108)
CO2 SERPL-SCNC: 29 MMOL/L — SIGNIFICANT CHANGE UP (ref 22–31)
CREAT SERPL-MCNC: 6.5 MG/DL — HIGH (ref 0.5–1.3)
EGFR: 6 ML/MIN/1.73M2 — LOW
GLUCOSE SERPL-MCNC: 262 MG/DL — HIGH (ref 70–99)
HCT VFR BLD CALC: 29.8 % — LOW (ref 34.5–45)
HGB BLD-MCNC: 9.5 G/DL — LOW (ref 11.5–15.5)
MCHC RBC-ENTMCNC: 31.4 PG — SIGNIFICANT CHANGE UP (ref 27–34)
MCHC RBC-ENTMCNC: 31.9 GM/DL — LOW (ref 32–36)
MCV RBC AUTO: 98.3 FL — SIGNIFICANT CHANGE UP (ref 80–100)
NRBC # BLD: 0 /100 WBCS — SIGNIFICANT CHANGE UP (ref 0–0)
PLATELET # BLD AUTO: 437 K/UL — HIGH (ref 150–400)
POTASSIUM SERPL-MCNC: 5.2 MMOL/L — SIGNIFICANT CHANGE UP (ref 3.5–5.3)
POTASSIUM SERPL-SCNC: 5.2 MMOL/L — SIGNIFICANT CHANGE UP (ref 3.5–5.3)
RBC # BLD: 3.03 M/UL — LOW (ref 3.8–5.2)
RBC # FLD: 17.8 % — HIGH (ref 10.3–14.5)
SARS-COV-2 RNA SPEC QL NAA+PROBE: SIGNIFICANT CHANGE UP
SODIUM SERPL-SCNC: 132 MMOL/L — LOW (ref 135–145)
WBC # BLD: 11.02 K/UL — HIGH (ref 3.8–10.5)
WBC # FLD AUTO: 11.02 K/UL — HIGH (ref 3.8–10.5)

## 2022-04-25 PROCEDURE — 99232 SBSQ HOSP IP/OBS MODERATE 35: CPT

## 2022-04-25 RX ORDER — GLUCAGON INJECTION, SOLUTION 0.5 MG/.1ML
1 INJECTION, SOLUTION SUBCUTANEOUS ONCE
Refills: 0 | Status: DISCONTINUED | OUTPATIENT
Start: 2022-04-25 | End: 2022-05-02

## 2022-04-25 RX ORDER — INSULIN LISPRO 100/ML
VIAL (ML) SUBCUTANEOUS
Refills: 0 | Status: DISCONTINUED | OUTPATIENT
Start: 2022-04-25 | End: 2022-05-02

## 2022-04-25 RX ORDER — INSULIN GLARGINE 100 [IU]/ML
10 INJECTION, SOLUTION SUBCUTANEOUS AT BEDTIME
Refills: 0 | Status: DISCONTINUED | OUTPATIENT
Start: 2022-04-25 | End: 2022-04-29

## 2022-04-25 RX ADMIN — Medication 8: at 11:37

## 2022-04-25 RX ADMIN — Medication 1: at 21:21

## 2022-04-25 RX ADMIN — Medication 81 MILLIGRAM(S): at 11:12

## 2022-04-25 RX ADMIN — Medication 8: at 16:56

## 2022-04-25 RX ADMIN — Medication 1 TABLET(S): at 11:12

## 2022-04-25 RX ADMIN — SENNA PLUS 2 TABLET(S): 8.6 TABLET ORAL at 21:23

## 2022-04-25 RX ADMIN — HEPARIN SODIUM 5000 UNIT(S): 5000 INJECTION INTRAVENOUS; SUBCUTANEOUS at 21:21

## 2022-04-25 RX ADMIN — Medication 667 MILLIGRAM(S): at 08:07

## 2022-04-25 RX ADMIN — CLOPIDOGREL BISULFATE 75 MILLIGRAM(S): 75 TABLET, FILM COATED ORAL at 11:12

## 2022-04-25 RX ADMIN — Medication 60 MILLIGRAM(S): at 05:21

## 2022-04-25 RX ADMIN — Medication 6: at 08:07

## 2022-04-25 RX ADMIN — Medication 100 MILLIGRAM(S): at 17:28

## 2022-04-25 RX ADMIN — Medication 60 MILLIGRAM(S): at 13:11

## 2022-04-25 RX ADMIN — Medication 60 MILLIGRAM(S): at 21:23

## 2022-04-25 RX ADMIN — Medication 0.1 MILLIGRAM(S): at 05:21

## 2022-04-25 RX ADMIN — INSULIN GLARGINE 10 UNIT(S): 100 INJECTION, SOLUTION SUBCUTANEOUS at 21:24

## 2022-04-25 RX ADMIN — PANTOPRAZOLE SODIUM 40 MILLIGRAM(S): 20 TABLET, DELAYED RELEASE ORAL at 05:21

## 2022-04-25 RX ADMIN — ATORVASTATIN CALCIUM 40 MILLIGRAM(S): 80 TABLET, FILM COATED ORAL at 21:21

## 2022-04-25 RX ADMIN — Medication 0.1 MILLIGRAM(S): at 17:27

## 2022-04-25 RX ADMIN — Medication 667 MILLIGRAM(S): at 16:56

## 2022-04-25 RX ADMIN — Medication 50 MILLIGRAM(S): at 11:12

## 2022-04-25 RX ADMIN — Medication 667 MILLIGRAM(S): at 11:39

## 2022-04-25 RX ADMIN — Medication 100 MILLIGRAM(S): at 05:21

## 2022-04-25 RX ADMIN — Medication 1 TABLET(S): at 05:21

## 2022-04-25 RX ADMIN — Medication 3 MILLIGRAM(S): at 21:30

## 2022-04-25 RX ADMIN — Medication 100 MILLIGRAM(S): at 18:49

## 2022-04-25 RX ADMIN — HEPARIN SODIUM 5000 UNIT(S): 5000 INJECTION INTRAVENOUS; SUBCUTANEOUS at 08:08

## 2022-04-25 RX ADMIN — Medication 1 TABLET(S): at 17:27

## 2022-04-25 NOTE — PROGRESS NOTE ADULT - SUBJECTIVE AND OBJECTIVE BOX
Patient is a 71y Female with a known history of :  Falls [W19.XXXA]    DM (diabetes mellitus) [E11.9]    Acute osteomyelitis [M86.10]    ESRD on dialysis [N18.6]    Anemia secondary to renal failure [N18.9]    Atrial fibrillation [I48.91]    PAD (peripheral artery disease) [I73.9]    Non-compliant patient [Z91.19]    Prophylactic measure [Z29.9]    Need for follow-up by  [Z78.9]    Gangrenous toe [I96]    Chronic osteomyelitis [M86.60]      HPI:  Patient came to ED because she  fell today on her way to dialysis, pt states that she is falling everyday and always feels weak. pt poor historian, missed dialysis today, will need admission for placement. Recent admission 04/05/22 -70yo female bib ems with sob, pt states she has missed the last 2 rounds of dialysis and is due today, and has been having worsening sob, no cough, fever, chills, no chest pain no other complaints .Found to have hyperkalemia Diagnosed with foot infection MRI showed evidence of acute osteomyelitis of L medial malleolus and distal aspect of R 1st toe ,poa . -ID recommended  cefazolin 2g-2g-3g with HD x 4 weeks .Patient was discharged home with home care and iv abx at HD center Palliative care consult requested ,to discuss advance directives and complete MOLST  (22 Apr 2022 16:22)      REVIEW OF SYSTEMS:    CONSTITUTIONAL: No fever, weight loss, or fatigue  EYES: No eye pain, visual disturbances, or discharge  ENMT:  No difficulty hearing, tinnitus, vertigo; No sinus or throat pain  NECK: No pain or stiffness  BREASTS: No pain, masses, or nipple discharge  RESPIRATORY: No cough, wheezing, chills or hemoptysis; No shortness of breath  CARDIOVASCULAR: No chest pain, palpitations, dizziness, or leg swelling  GASTROINTESTINAL: No abdominal or epigastric pain. No nausea, vomiting, or hematemesis; No diarrhea or constipation. No melena or hematochezia.  GENITOURINARY: No dysuria, frequency, hematuria, or incontinence  NEUROLOGICAL: No headaches, memory loss, loss of strength, numbness, or tremors  SKIN: No itching, burning, rashes, or lesions   LYMPH NODES: No enlarged glands  ENDOCRINE: No heat or cold intolerance; No hair loss  MUSCULOSKELETAL: No joint pain or swelling; No muscle, back, or extremity pain  PSYCHIATRIC: No depression, anxiety, mood swings, or difficulty sleeping  HEME/LYMPH: No easy bruising, or bleeding gums  ALLERGY AND IMMUNOLOGIC: No hives or eczema    MEDICATIONS  (STANDING):  aspirin enteric coated 81 milliGRAM(s) Oral daily  atorvastatin 40 milliGRAM(s) Oral at bedtime  calcium acetate 667 milliGRAM(s) Oral three times a day with meals  ceFAZolin   IVPB 1000 milliGRAM(s) IV Intermittent every 24 hours  cloNIDine 0.1 milliGRAM(s) Oral every 12 hours  clopidogrel Tablet 75 milliGRAM(s) Oral daily  dextrose 5%. 1000 milliLiter(s) (100 mL/Hr) IV Continuous <Continuous>  dextrose 5%. 1000 milliLiter(s) (50 mL/Hr) IV Continuous <Continuous>  dextrose 50% Injectable 25 Gram(s) IV Push once  dextrose 50% Injectable 12.5 Gram(s) IV Push once  dextrose 50% Injectable 25 Gram(s) IV Push once  diltiazem    Tablet 60 milliGRAM(s) Oral every 8 hours  epoetin fawad-epbx (RETACRIT) Injectable 26952 Unit(s) IV Push <User Schedule>  glucagon  Injectable 1 milliGRAM(s) IntraMuscular once  glucagon  Injectable 1 milliGRAM(s) IntraMuscular once  heparin   Injectable 5000 Unit(s) SubCutaneous every 12 hours  imipramine 50 milliGRAM(s) Oral daily  insulin glargine Injectable (LANTUS) 10 Unit(s) SubCutaneous at bedtime  insulin lispro (ADMELOG) corrective regimen sliding scale   SubCutaneous three times a day before meals  insulin lispro (ADMELOG) corrective regimen sliding scale   SubCutaneous at bedtime  lactobacillus acidophilus 1 Tablet(s) Oral two times a day  metoprolol tartrate 100 milliGRAM(s) Oral every 12 hours  multivitamin 1 Tablet(s) Oral daily  pantoprazole    Tablet 40 milliGRAM(s) Oral before breakfast  senna 2 Tablet(s) Oral at bedtime    MEDICATIONS  (PRN):  acetaminophen     Tablet .. 650 milliGRAM(s) Oral every 6 hours PRN Temp greater or equal to 38C (100.4F), Mild Pain (1 - 3)  aluminum hydroxide/magnesium hydroxide/simethicone Suspension 30 milliLiter(s) Oral every 4 hours PRN Dyspepsia  dextrose Oral Gel 15 Gram(s) Oral once PRN Blood Glucose LESS THAN 70 milliGRAM(s)/deciliter  melatonin 3 milliGRAM(s) Oral at bedtime PRN Insomnia  ondansetron Injectable 4 milliGRAM(s) IV Push every 8 hours PRN Nausea and/or Vomiting  traMADol 50 milliGRAM(s) Oral three times a day PRN Moderate Pain (4 - 6)      ALLERGIES: latex (Unknown)  No Known Drug Allergies      FAMILY HISTORY:      PHYSICAL EXAMINATION:  -----------------------------  T(C): 37.1 (04-25-22 @ 04:27), Max: 37.1 (04-25-22 @ 04:27)  HR: 66 (04-25-22 @ 04:27) (62 - 66)  BP: 154/59 (04-25-22 @ 04:27) (111/59 - 154/59)  RR: 18 (04-25-22 @ 04:27) (17 - 18)  SpO2: 95% (04-25-22 @ 04:27) (90% - 97%)  Wt(kg): --    04-24 @ 07:01  -  04-25 @ 07:00  --------------------------------------------------------  IN:  Total IN: 0 mL    OUT:    Stool (mL): 0 mL  Total OUT: 0 mL    Total NET: 0 mL            VITALS  T(C): 37.1 (04-25-22 @ 04:27), Max: 37.1 (04-25-22 @ 04:27)  HR: 66 (04-25-22 @ 04:27) (62 - 66)  BP: 154/59 (04-25-22 @ 04:27) (111/59 - 154/59)  RR: 18 (04-25-22 @ 04:27) (17 - 18)  SpO2: 95% (04-25-22 @ 04:27) (90% - 97%)    Constitutional: well developed, normal appearance, well groomed, well nourished, no deformities and no acute distress.   Eyes: the conjunctiva exhibited no abnormalities and the eyelids demonstrated no xanthelasmas.   HEENT: normal oral mucosa, no oral pallor and no oral cyanosis.   Neck: normal jugular venous A waves present, normal jugular venous V waves present and no jugular venous wilson A waves.   Pulmonary: no respiratory distress, normal respiratory rhythm and effort, no accessory muscle use and lungs were clear to auscultation bilaterally.   Cardiovascular: heart rate and rhythm were normal, normal S1 and S2 and no murmur, gallop, rub, heave or thrill are present.   Abdomen: soft, non-tender, no hepato-splenomegaly and no abdominal mass palpated.   Musculoskeletal: the gait could not be assessed..   Extremities: no clubbing of the fingernails, no localized cyanosis, no petechial hemorrhages and no ischemic changes.   Skin: normal skin color and pigmentation, no rash, no venous stasis, no skin lesions, no skin ulcer and no xanthoma was observed.   Psychiatric: oriented to person, place, and time, the affect was normal, the mood was normal and not feeling anxious.     LABS:   --------  04-23    134<L>  |  92<L>  |  74<H>  ----------------------------<  161<H>  5.4<H>   |  25  |  8.70<H>    Ca    9.3      23 Apr 2022 09:21    TPro  6.8  /  Alb  2.7<L>  /  TBili  0.4  /  DBili  x   /  AST  22  /  ALT  <6<L>  /  AlkPhos  93  04-23                         9.5    11.24 )-----------( 445      ( 23 Apr 2022 09:21 )             30.3                 RADIOLOGY:  -----------------    ECG:     ECHO:

## 2022-04-25 NOTE — PROGRESS NOTE ADULT - SUBJECTIVE AND OBJECTIVE BOX
PROGRESS NOTE  Patient is a 71y old  Female who presents with a chief complaint of falls ,weakness (25 Apr 2022 16:01)  Chart and available morning labs /imaging are reviewed electronically , urgent issues addressed . More information  is being added upon completion of rounds , when more information is collected and management discussed with consultants , medical staff and social service/case management on the floor   OVERNIGHT  No new issues reported by medical staff . All above noted Patient is resting in a bed comfortably . .No distress noted Seen by PT-MEGHAN recommended   Adamantly refusing rehab placement ,she was reminded that she is admitted with frequent falls but she is still stating that  will be helping .Discussed with  on 1 e and input regarding safety of home situation requested   HPI:  Patient came to ED because she  fell today on her way to dialysis, pt states that she is falling everyday and always feels weak. pt poor historian, missed dialysis today, will need admission for placement. Recent admission 04/05/22 -70yo female bib ems with sob, pt states she has missed the last 2 rounds of dialysis and is due today, and has been having worsening sob, no cough, fever, chills, no chest pain no other complaints .Found to have hyperkalemia Diagnosed with foot infection MRI showed evidence of acute osteomyelitis of L medial malleolus and distal aspect of R 1st toe ,poa . -ID recommended  cefazolin 2g-2g-3g with HD x 4 weeks .Patient was discharged home with home care and iv abx at HD center Palliative care consult requested ,to discuss advance directives and complete MOLST  (22 Apr 2022 16:22)    PAST MEDICAL & SURGICAL HISTORY:  Diabetes mellitus II    HTN (hypertension)    h/o Anxiety attack    Depression    h/o Myocardial infarct 2007    CAD (coronary artery disease)    h/o Hepatitis A 1969  currently resolved, no symptoms    PAD (peripheral artery disease)    Murmur, cardiac    h/o Smoking  quitted 3/2012    CRF (chronic renal failure), unspecified stage    Dialysis patient    HTN (hypertension)    Anemia secondary to renal failure    coronary stent 2007    s/p Ovarian cyst removal    s/p surgical removal of benign Skin lesion epigastric area        MEDICATIONS  (STANDING):  aspirin enteric coated 81 milliGRAM(s) Oral daily  atorvastatin 40 milliGRAM(s) Oral at bedtime  calcium acetate 667 milliGRAM(s) Oral three times a day with meals  ceFAZolin   IVPB 1000 milliGRAM(s) IV Intermittent every 24 hours  cloNIDine 0.1 milliGRAM(s) Oral every 12 hours  clopidogrel Tablet 75 milliGRAM(s) Oral daily  dextrose 5%. 1000 milliLiter(s) (100 mL/Hr) IV Continuous <Continuous>  dextrose 5%. 1000 milliLiter(s) (50 mL/Hr) IV Continuous <Continuous>  dextrose 50% Injectable 25 Gram(s) IV Push once  dextrose 50% Injectable 12.5 Gram(s) IV Push once  dextrose 50% Injectable 25 Gram(s) IV Push once  diltiazem    Tablet 60 milliGRAM(s) Oral every 8 hours  epoetin fawad-epbx (RETACRIT) Injectable 53487 Unit(s) IV Push <User Schedule>  glucagon  Injectable 1 milliGRAM(s) IntraMuscular once  glucagon  Injectable 1 milliGRAM(s) IntraMuscular once  heparin   Injectable 5000 Unit(s) SubCutaneous every 12 hours  imipramine 50 milliGRAM(s) Oral daily  insulin glargine Injectable (LANTUS) 10 Unit(s) SubCutaneous at bedtime  insulin lispro (ADMELOG) corrective regimen sliding scale   SubCutaneous three times a day before meals  insulin lispro (ADMELOG) corrective regimen sliding scale   SubCutaneous at bedtime  lactobacillus acidophilus 1 Tablet(s) Oral two times a day  metoprolol tartrate 100 milliGRAM(s) Oral every 12 hours  multivitamin 1 Tablet(s) Oral daily  pantoprazole    Tablet 40 milliGRAM(s) Oral before breakfast  senna 2 Tablet(s) Oral at bedtime    MEDICATIONS  (PRN):  acetaminophen     Tablet .. 650 milliGRAM(s) Oral every 6 hours PRN Temp greater or equal to 38C (100.4F), Mild Pain (1 - 3)  aluminum hydroxide/magnesium hydroxide/simethicone Suspension 30 milliLiter(s) Oral every 4 hours PRN Dyspepsia  dextrose Oral Gel 15 Gram(s) Oral once PRN Blood Glucose LESS THAN 70 milliGRAM(s)/deciliter  melatonin 3 milliGRAM(s) Oral at bedtime PRN Insomnia  ondansetron Injectable 4 milliGRAM(s) IV Push every 8 hours PRN Nausea and/or Vomiting  traMADol 50 milliGRAM(s) Oral three times a day PRN Moderate Pain (4 - 6)      OBJECTIVE    T(C): 36.7 (04-25-22 @ 12:40), Max: 37.1 (04-25-22 @ 04:27)  HR: 67 (04-25-22 @ 13:12) (61 - 67)  BP: 142/58 (04-25-22 @ 13:12) (111/59 - 154/59)  RR: 18 (04-25-22 @ 12:40) (17 - 18)  SpO2: 93% (04-25-22 @ 12:40) (93% - 97%)  Wt(kg): --  I&O's Summary    24 Apr 2022 07:01  -  25 Apr 2022 07:00  --------------------------------------------------------  IN: 0 mL / OUT: 0 mL / NET: 0 mL          REVIEW OF SYSTEMS:  A 10-point review of systems was obtained.   Review of systems otherwise unchanged compared to prior visit except as previously noted   PHYSICAL EXAM:  Appearance: NAD. VS past 24 hrs -as above   HEENT:   Moist oral mucosa. Conjunctiva clear b/l.   Neck : supple  Respiratory: Lungs CTAB.  Gastrointestinal:  Soft, nontender. No rebound. No rigidity. BS present	  Cardiovascular: RRR ,S1S2 present  Neurologic: Non-focal. Moving all extremities.  Extremities: No edema. No erythema. No calf tenderness.  Skin: No rashes, No ecchymoses, No cyanosis.	  wounds ,skin lesions-See skin assesment flow sheet   LABS:                        9.5    11.02 )-----------( 437      ( 25 Apr 2022 09:08 )             29.8     04-25    132<L>  |  93<L>  |  65<H>  ----------------------------<  262<H>  5.2   |  29  |  6.50<H>    Ca    9.8      25 Apr 2022 09:08      CAPILLARY BLOOD GLUCOSE      POCT Blood Glucose.: 301 mg/dL (25 Apr 2022 16:48)  POCT Blood Glucose.: 303 mg/dL (25 Apr 2022 11:28)  POCT Blood Glucose.: 254 mg/dL (25 Apr 2022 07:42)  POCT Blood Glucose.: 375 mg/dL (24 Apr 2022 21:06)          Culture - Blood (collected 22 Apr 2022 18:38)  Source: .Blood Blood-Peripheral  Preliminary Report (23 Apr 2022 19:02):    No growth to date.    Culture - Blood (collected 22 Apr 2022 18:38)  Source: .Blood Blood-Peripheral  Preliminary Report (23 Apr 2022 19:02):    No growth to date.      RADIOLOGY & ADDITIONAL TESTS:   reviewed elctronically  ASSESSMENT/PLAN: 	    25 minutes aggregate time was spent on this visit, 50% visit time spent in care co-ordination with other attendings and counselling patient .I have discussed care plan with patient / HCP/family member ,who expressed understanding of problems treatment and their effect and side effects, alternatives in details. I have asked if they have any questions and concerns and appropriately addressed them to best of my ability.

## 2022-04-25 NOTE — PROGRESS NOTE ADULT - SUBJECTIVE AND OBJECTIVE BOX
[facility]  INFECTIOUS DISEASES - [address], [place] [address]  Tel: [phone]     Fax: [phone]  =======================================================    SHILO KOHLER [id number]    Follow up: No fevers. Denies any lower extremity pain. Denies any SOB, nausea or diarrhea.    Allergies:  latex (Unknown)  No Known Drug Allergies      Antibiotics:  acetaminophen     Tablet .. 650 milliGRAM(s) Oral every 6 hours PRN  aluminum hydroxide/magnesium hydroxide/simethicone Suspension 30 milliLiter(s) Oral every 4 hours PRN  aspirin enteric coated 81 milliGRAM(s) Oral daily  atorvastatin 40 milliGRAM(s) Oral at bedtime  calcium acetate 667 milliGRAM(s) Oral three times a day with meals  ceFAZolin   IVPB 1000 milliGRAM(s) IV Intermittent every 24 hours  cloNIDine 0.1 milliGRAM(s) Oral every 12 hours  clopidogrel Tablet 75 milliGRAM(s) Oral daily  dextrose 5%. 1000 milliLiter(s) IV Continuous <Continuous>  dextrose 5%. 1000 milliLiter(s) IV Continuous <Continuous>  dextrose 50% Injectable 25 Gram(s) IV Push once  dextrose 50% Injectable 12.5 Gram(s) IV Push once  dextrose 50% Injectable 25 Gram(s) IV Push once  dextrose Oral Gel 15 Gram(s) Oral once PRN  diltiazem    Tablet 60 milliGRAM(s) Oral every 8 hours  epoetin fawad-epbx (RETACRIT) Injectable 79023 Unit(s) IV Push <User Schedule>  glucagon  Injectable 1 milliGRAM(s) IntraMuscular once  glucagon  Injectable 1 milliGRAM(s) IntraMuscular once  heparin   Injectable 5000 Unit(s) SubCutaneous every 12 hours  imipramine 50 milliGRAM(s) Oral daily  insulin glargine Injectable (LANTUS) 10 Unit(s) SubCutaneous at bedtime  insulin lispro (ADMELOG) corrective regimen sliding scale   SubCutaneous at bedtime  insulin lispro (ADMELOG) corrective regimen sliding scale   SubCutaneous three times a day before meals  lactobacillus acidophilus 1 Tablet(s) Oral two times a day  melatonin 3 milliGRAM(s) Oral at bedtime PRN  metoprolol tartrate 100 milliGRAM(s) Oral every 12 hours  multivitamin 1 Tablet(s) Oral daily  ondansetron Injectable 4 milliGRAM(s) IV Push every 8 hours PRN  pantoprazole    Tablet 40 milliGRAM(s) Oral before breakfast  senna 2 Tablet(s) Oral at bedtime  traMADol 50 milliGRAM(s) Oral three times a day PRN       REVIEW OF SYSTEMS:  CONSTITUTIONAL:  No Fever or chills  CARDIOVASCULAR:  No chest pain  RESPIRATORY: see history  GASTROINTESTINAL:  No nausea, vomiting, diarrhea. no abdominal pain  GENITOURINARY:  No dysuria  NEUROLOGIC:  No headache, no dizziness  PSYCHIATRIC:  No disorder of thought or mood.     Physical Exam:  ICU Vital Signs Last 24 Hrs  T(C): 36.7 (25 Apr 2022 12:40), Max: 37.1 (25 Apr 2022 04:27)  T(F): 98.1 (25 Apr 2022 12:40), Max: 98.7 (25 Apr 2022 04:27)  HR: 67 (25 Apr 2022 13:12) (61 - 67)  BP: 142/58 (25 Apr 2022 13:12) (111/59 - 154/59)  BP(mean): --  ABP: --  ABP(mean): --  RR: 18 (25 Apr 2022 12:40) (17 - 18)  SpO2: 93% (25 Apr 2022 12:40) (90% - 97%)     GEN: NAD  HEENT: normocephalic and atraumatic.     NECK: Supple.   LUNGS: Clear to auscultation.  HEART: Regular rate and rhythm  ABDOMEN: Soft, nontender, and nondistended.   EXTREMITIES: No knee swelling, no leg edema. RUE AV fistula  NEUROLOGIC: AOx3  PSYCHIATRIC: Appropriate affect .  SKIN: R 1st and 2nd toe ulcers, no surrounding erythema, warmth or swelling noted, no active drainage  left medial heel ulcer, no surrounding swelling, warmth or erythema      Labs:  04-25    132<L>  |  93<L>  |  65<H>  ----------------------------<  262<H>  5.2   |  29  |  6.50<H>    Ca    9.8      25 Apr 2022 09:08                            9.5    11.02 )-----------( 437      ( 25 Apr 2022 09:08 )             29.8             RECENT CULTURES:  04-22 @ 18:38 .Blood Blood-Peripheral     No growth to date.              All imaging and data are reviewed.

## 2022-04-25 NOTE — PROGRESS NOTE ADULT - SUBJECTIVE AND OBJECTIVE BOX
Date/Time Patient Seen:  		  Referring MD:   Data Reviewed	       Patient is a 71y old  Female who presents with a chief complaint of falls ,weakness (24 Apr 2022 11:51)      Subjective/HPI     PAST MEDICAL & SURGICAL HISTORY:  Diabetes mellitus II    HTN (hypertension)    h/o Anxiety attack    Depression    h/o Myocardial infarct 2007    CAD (coronary artery disease)    CAD (coronary artery disease)    h/o Hepatitis A 1969  currently resolved, no symptoms    PAD (peripheral artery disease)    Murmur, cardiac    h/o Smoking  quitted 3/2012    CRF (chronic renal failure), unspecified stage    Dialysis patient    Anemia secondary to renal failure    HTN (hypertension)    coronary stent 2007    s/p Ovarian cyst removal    s/p surgical removal of benign Skin lesion epigastric area          Medication list         MEDICATIONS  (STANDING):  aspirin enteric coated 81 milliGRAM(s) Oral daily  atorvastatin 40 milliGRAM(s) Oral at bedtime  calcium acetate 667 milliGRAM(s) Oral three times a day with meals  ceFAZolin   IVPB 1000 milliGRAM(s) IV Intermittent every 24 hours  cloNIDine 0.1 milliGRAM(s) Oral every 12 hours  clopidogrel Tablet 75 milliGRAM(s) Oral daily  dextrose 5%. 1000 milliLiter(s) (100 mL/Hr) IV Continuous <Continuous>  dextrose 5%. 1000 milliLiter(s) (50 mL/Hr) IV Continuous <Continuous>  dextrose 50% Injectable 25 Gram(s) IV Push once  dextrose 50% Injectable 12.5 Gram(s) IV Push once  dextrose 50% Injectable 25 Gram(s) IV Push once  diltiazem    Tablet 60 milliGRAM(s) Oral every 8 hours  epoetin fawad-epbx (RETACRIT) Injectable 77906 Unit(s) IV Push <User Schedule>  glucagon  Injectable 1 milliGRAM(s) IntraMuscular once  heparin   Injectable 5000 Unit(s) SubCutaneous every 12 hours  imipramine 50 milliGRAM(s) Oral daily  insulin lispro (ADMELOG) corrective regimen sliding scale   SubCutaneous three times a day before meals  insulin lispro (ADMELOG) corrective regimen sliding scale   SubCutaneous at bedtime  lactobacillus acidophilus 1 Tablet(s) Oral two times a day  metoprolol tartrate 100 milliGRAM(s) Oral every 12 hours  multivitamin 1 Tablet(s) Oral daily  pantoprazole    Tablet 40 milliGRAM(s) Oral before breakfast  senna 2 Tablet(s) Oral at bedtime    MEDICATIONS  (PRN):  acetaminophen     Tablet .. 650 milliGRAM(s) Oral every 6 hours PRN Temp greater or equal to 38C (100.4F), Mild Pain (1 - 3)  aluminum hydroxide/magnesium hydroxide/simethicone Suspension 30 milliLiter(s) Oral every 4 hours PRN Dyspepsia  dextrose Oral Gel 15 Gram(s) Oral once PRN Blood Glucose LESS THAN 70 milliGRAM(s)/deciliter  melatonin 3 milliGRAM(s) Oral at bedtime PRN Insomnia  ondansetron Injectable 4 milliGRAM(s) IV Push every 8 hours PRN Nausea and/or Vomiting  traMADol 50 milliGRAM(s) Oral three times a day PRN Moderate Pain (4 - 6)         Vitals log        ICU Vital Signs Last 24 Hrs  T(C): 37.1 (25 Apr 2022 04:27), Max: 37.1 (25 Apr 2022 04:27)  T(F): 98.7 (25 Apr 2022 04:27), Max: 98.7 (25 Apr 2022 04:27)  HR: 66 (25 Apr 2022 04:27) (62 - 66)  BP: 154/59 (25 Apr 2022 04:27) (111/59 - 154/59)  BP(mean): --  ABP: --  ABP(mean): --  RR: 18 (25 Apr 2022 04:27) (17 - 18)  SpO2: 95% (25 Apr 2022 04:27) (90% - 97%)           Input and Output:  I&O's Detail    23 Apr 2022 07:01  -  24 Apr 2022 07:00  --------------------------------------------------------  IN:    Other (mL): 900 mL  Total IN: 900 mL    OUT:    Other (mL): 2900 mL  Total OUT: 2900 mL    Total NET: -2000 mL      24 Apr 2022 07:01  -  25 Apr 2022 05:19  --------------------------------------------------------  IN:  Total IN: 0 mL    OUT:    Stool (mL): 0 mL  Total OUT: 0 mL    Total NET: 0 mL          Lab Data                        9.5    11.24 )-----------( 445      ( 23 Apr 2022 09:21 )             30.3     04-23    134<L>  |  92<L>  |  74<H>  ----------------------------<  161<H>  5.4<H>   |  25  |  8.70<H>    Ca    9.3      23 Apr 2022 09:21    TPro  6.8  /  Alb  2.7<L>  /  TBili  0.4  /  DBili  x   /  AST  22  /  ALT  <6<L>  /  AlkPhos  93  04-23            Review of Systems	      Objective     Physical Examination    heart s1s2  lung dec BS  abd soft      Pertinent Lab findings & Imaging      Dexter:  NO   Adequate UO     I&O's Detail    23 Apr 2022 07:01  -  24 Apr 2022 07:00  --------------------------------------------------------  IN:    Other (mL): 900 mL  Total IN: 900 mL    OUT:    Other (mL): 2900 mL  Total OUT: 2900 mL    Total NET: -2000 mL      24 Apr 2022 07:01  -  25 Apr 2022 05:19  --------------------------------------------------------  IN:  Total IN: 0 mL    OUT:    Stool (mL): 0 mL  Total OUT: 0 mL    Total NET: 0 mL               Discussed with:     Cultures:	        Radiology

## 2022-04-25 NOTE — PROGRESS NOTE ADULT - SUBJECTIVE AND OBJECTIVE BOX
71y year old Female seen at [facility] 1EAS 113 D1 for ----------.  Denies any fever, chills, nausea, vomiting, chest pain, shortness of breath, or calf pain at this time.    Allergies    latex (Unknown)  No Known Drug Allergies    Intolerances        MEDICATIONS  (STANDING):  aspirin enteric coated 81 milliGRAM(s) Oral daily  atorvastatin 40 milliGRAM(s) Oral at bedtime  calcium acetate 667 milliGRAM(s) Oral three times a day with meals  ceFAZolin   IVPB 1000 milliGRAM(s) IV Intermittent every 24 hours  cloNIDine 0.1 milliGRAM(s) Oral every 12 hours  clopidogrel Tablet 75 milliGRAM(s) Oral daily  dextrose 5%. 1000 milliLiter(s) (100 mL/Hr) IV Continuous <Continuous>  dextrose 5%. 1000 milliLiter(s) (50 mL/Hr) IV Continuous <Continuous>  dextrose 50% Injectable 25 Gram(s) IV Push once  dextrose 50% Injectable 12.5 Gram(s) IV Push once  dextrose 50% Injectable 25 Gram(s) IV Push once  diltiazem    Tablet 60 milliGRAM(s) Oral every 8 hours  epoetin fawad-epbx (RETACRIT) Injectable 08983 Unit(s) IV Push <User Schedule>  glucagon  Injectable 1 milliGRAM(s) IntraMuscular once  glucagon  Injectable 1 milliGRAM(s) IntraMuscular once  heparin   Injectable 5000 Unit(s) SubCutaneous every 12 hours  imipramine 50 milliGRAM(s) Oral daily  insulin glargine Injectable (LANTUS) 10 Unit(s) SubCutaneous at bedtime  insulin lispro (ADMELOG) corrective regimen sliding scale   SubCutaneous three times a day before meals  insulin lispro (ADMELOG) corrective regimen sliding scale   SubCutaneous at bedtime  lactobacillus acidophilus 1 Tablet(s) Oral two times a day  metoprolol tartrate 100 milliGRAM(s) Oral every 12 hours  multivitamin 1 Tablet(s) Oral daily  pantoprazole    Tablet 40 milliGRAM(s) Oral before breakfast  senna 2 Tablet(s) Oral at bedtime    MEDICATIONS  (PRN):  acetaminophen     Tablet .. 650 milliGRAM(s) Oral every 6 hours PRN Temp greater or equal to 38C (100.4F), Mild Pain (1 - 3)  aluminum hydroxide/magnesium hydroxide/simethicone Suspension 30 milliLiter(s) Oral every 4 hours PRN Dyspepsia  dextrose Oral Gel 15 Gram(s) Oral once PRN Blood Glucose LESS THAN 70 milliGRAM(s)/deciliter  melatonin 3 milliGRAM(s) Oral at bedtime PRN Insomnia  ondansetron Injectable 4 milliGRAM(s) IV Push every 8 hours PRN Nausea and/or Vomiting  traMADol 50 milliGRAM(s) Oral three times a day PRN Moderate Pain (4 - 6)      Vital Signs Last 24 Hrs  T(C): 36.7 (25 Apr 2022 12:40), Max: 37.1 (25 Apr 2022 04:27)  T(F): 98.1 (25 Apr 2022 12:40), Max: 98.7 (25 Apr 2022 04:27)  HR: 67 (25 Apr 2022 13:12) (61 - 67)  BP: 142/58 (25 Apr 2022 13:12) (111/59 - 154/59)  BP(mean): --  RR: 18 (25 Apr 2022 12:40) (17 - 18)  SpO2: 93% (25 Apr 2022 12:40) (90% - 97%)    PHYSICAL EXAM:  Vascular: DP & PT non-palpable bilaterally, Capillary refill 3 seconds, Digital hair absent bilaterally. Skin cold to touch, right foot  Neurological: Light touch sensation intact bilaterally  Musculoskeletal: POP to the right hallux, improved. 5/5 strength in all quadrants bilaterally, AJ & STJ ROM intact  Dermatological: Right hallux gangrene ulceration noted, right 2nd digit gangrenous changes, no open wounds, no probe to bone, no periwound erythema, no fluctuance, no malodor, no proximal streaking at this time    CBC Full  -  ( 25 Apr 2022 09:08 )  WBC Count : 11.02 K/uL  RBC Count : 3.03 M/uL  Hemoglobin : 9.5 g/dL  Hematocrit : 29.8 %  Platelet Count - Automated : 437 K/uL  Mean Cell Volume : 98.3 fl  Mean Cell Hemoglobin : 31.4 pg  Mean Cell Hemoglobin Concentration : 31.9 gm/dL  x      ----------CHEM PANEL----------          Culture - Blood (collected 22 Apr 2022 18:38)  Source: .Blood Blood-Peripheral  Preliminary Report (23 Apr 2022 19:02):    No growth to date.    Culture - Blood (collected 22 Apr 2022 18:38)  Source: .Blood Blood-Peripheral  Preliminary Report (23 Apr 2022 19:02):    No growth to date.

## 2022-04-25 NOTE — PROGRESS NOTE ADULT - SUBJECTIVE AND OBJECTIVE BOX
Neurology follow up note    SHILO [id number]      Interval History:    Patient feels ok no new complaints.    Allergies    latex (Unknown)  No Known Drug Allergies    Intolerances        MEDICATIONS    acetaminophen     Tablet .. 650 milliGRAM(s) Oral every 6 hours PRN  aluminum hydroxide/magnesium hydroxide/simethicone Suspension 30 milliLiter(s) Oral every 4 hours PRN  aspirin enteric coated 81 milliGRAM(s) Oral daily  atorvastatin 40 milliGRAM(s) Oral at bedtime  calcium acetate 667 milliGRAM(s) Oral three times a day with meals  ceFAZolin   IVPB 1000 milliGRAM(s) IV Intermittent every 24 hours  cloNIDine 0.1 milliGRAM(s) Oral every 12 hours  clopidogrel Tablet 75 milliGRAM(s) Oral daily  dextrose 5%. 1000 milliLiter(s) IV Continuous <Continuous>  dextrose 5%. 1000 milliLiter(s) IV Continuous <Continuous>  dextrose 50% Injectable 25 Gram(s) IV Push once  dextrose 50% Injectable 12.5 Gram(s) IV Push once  dextrose 50% Injectable 25 Gram(s) IV Push once  dextrose Oral Gel 15 Gram(s) Oral once PRN  diltiazem    Tablet 60 milliGRAM(s) Oral every 8 hours  epoetin fawad-epbx (RETACRIT) Injectable 16436 Unit(s) IV Push <User Schedule>  glucagon  Injectable 1 milliGRAM(s) IntraMuscular once  glucagon  Injectable 1 milliGRAM(s) IntraMuscular once  heparin   Injectable 5000 Unit(s) SubCutaneous every 12 hours  imipramine 50 milliGRAM(s) Oral daily  insulin glargine Injectable (LANTUS) 10 Unit(s) SubCutaneous at bedtime  insulin lispro (ADMELOG) corrective regimen sliding scale   SubCutaneous three times a day before meals  insulin lispro (ADMELOG) corrective regimen sliding scale   SubCutaneous at bedtime  lactobacillus acidophilus 1 Tablet(s) Oral two times a day  melatonin 3 milliGRAM(s) Oral at bedtime PRN  metoprolol tartrate 100 milliGRAM(s) Oral every 12 hours  multivitamin 1 Tablet(s) Oral daily  ondansetron Injectable 4 milliGRAM(s) IV Push every 8 hours PRN  pantoprazole    Tablet 40 milliGRAM(s) Oral before breakfast  senna 2 Tablet(s) Oral at bedtime  traMADol 50 milliGRAM(s) Oral three times a day PRN              Vital Signs Last 24 Hrs  T(C): 37.1 (25 Apr 2022 04:27), Max: 37.1 (25 Apr 2022 04:27)  T(F): 98.7 (25 Apr 2022 04:27), Max: 98.7 (25 Apr 2022 04:27)  HR: 66 (25 Apr 2022 04:27) (62 - 66)  BP: 154/59 (25 Apr 2022 04:27) (111/59 - 154/59)  BP(mean): --  RR: 18 (25 Apr 2022 04:27) (17 - 18)  SpO2: 95% (25 Apr 2022 04:27) (90% - 97%)    REVIEW OF SYSTEMS:  Constitutional:  No fever, chills, or night sweats.  Head:  No headaches.  Eyes:  No double vision or blurry vision.  Ears: No ringing in the ears.  Neck:  No neck pain.  Cardiovascular:  No chest pain.  Respiratory:  No shortness of breath.  Abdomen:  No nausea, vomiting, or abdominal pain.  Extremities/Neurological:  Positive numbness and tingling in bilateral feet.  Spine:  No history of back pain.    PHYSICAL EXAMINATION:    HEENT:  Head:  Normocephalic, atraumatic.  Eyes:  No scleral icterus.  Ears:  Hearing bilaterally intact.  NECK:  Supple.  RESPIRATORY:  Good air entry bilaterally.  CARDIOVASCULAR:  S1 and S2 heard.  ABDOMEN:  Soft, nontender.  EXTREMITIES:  No clubbing or cyanosis was noted.  Positive sign of scars and blood on the bottom of her feet from scrapes.      NEUROLOGIC:  The patient is awake and alert.  Extraocular movements were intact.  Upon conversing with the patient, at times, she may be slightly forgetful, but as per my conversation with the spouse, he has noticed that lately as well.  Speech was fluent.  Smile was symmetric.  Full visual fields.  Motor:  Bilateral upper 5/5.  Bilateral lower 4/5.  The patient had markedly impaired proprioception in bilateral lower extremities.  Knee jerks bilaterally were trace, suspect this could be secondary to diabetes and possibly peripheral neuropathy.                   LABS:  CBC Full  -  ( 23 Apr 2022 09:21 )  WBC Count : 11.24 K/uL  RBC Count : 3.08 M/uL  Hemoglobin : 9.5 g/dL  Hematocrit : 30.3 %  Platelet Count - Automated : 445 K/uL  Mean Cell Volume : 98.4 fl  Mean Cell Hemoglobin : 30.8 pg  Mean Cell Hemoglobin Concentration : 31.4 gm/dL  Auto Neutrophil # : 8.30 K/uL  Auto Lymphocyte # : 1.06 K/uL  Auto Monocyte # : 1.46 K/uL  Auto Eosinophil # : 0.20 K/uL  Auto Basophil # : 0.08 K/uL  Auto Neutrophil % : 73.9 %  Auto Lymphocyte % : 9.4 %  Auto Monocyte % : 13.0 %  Auto Eosinophil % : 1.8 %  Auto Basophil % : 0.7 %      04-23    134<L>  |  92<L>  |  74<H>  ----------------------------<  161<H>  5.4<H>   |  25  |  8.70<H>    Ca    9.3      23 Apr 2022 09:21    TPro  6.8  /  Alb  2.7<L>  /  TBili  0.4  /  DBili  x   /  AST  22  /  ALT  <6<L>  /  AlkPhos  93  04-23    Hemoglobin A1C:     LIVER FUNCTIONS - ( 23 Apr 2022 09:21 )  Alb: 2.7 g/dL / Pro: 6.8 g/dL / ALK PHOS: 93 U/L / ALT: <6 U/L / AST: 22 U/L / GGT: x           Vitamin B12         RADIOLOGY      ANALYSIS AND PLAN:  This is a 71-year-old with a history of difficulty ambulating, ataxia, and falls.  For ataxia and falls, suspect this is multifactorial secondary to diabetes and end-stage kidney disease and deconditioning, age-related changes leading to peripheral neuropathy type of component as well.  Suspect less likely this is a primary central nervous system event secondary to as per the patient and her spouse both legs become weak.  This points toward more a peripheral type of etiology.  Would recommend physical therapy evaluation.  Fall precaution.  For history of diabetes, strict control of blood sugars.  For history of hypertension, monitor systolic blood pressure.  For hyperlipidemia, continue the patient on statin.  The patient appears to be on multiple blood thinning medications, would recommend risks versus benefits must be weighed in regards to the patient's history of falls.  For possibly mild cognitive impairment  do to high risk of fall medical team noted KIT hansen on antiplatelets     Spoke with spouse, Geoffrey, at [phone] 4/24 today and the patient in great detail.  She will need rehab which she wants to go to    Greater than 40 minutes of time was spent with the patient, plan of care, reviewing data, with greater than 50% of the visit was spent counseling and/or coordinating care with multidisciplinary healthcare team     Neurology follow up note    SHILO [id number]      Interval History:    Patient feels ok no new complaints.    Allergies    latex (Unknown)  No Known Drug Allergies    Intolerances        MEDICATIONS    acetaminophen     Tablet .. 650 milliGRAM(s) Oral every 6 hours PRN  aluminum hydroxide/magnesium hydroxide/simethicone Suspension 30 milliLiter(s) Oral every 4 hours PRN  aspirin enteric coated 81 milliGRAM(s) Oral daily  atorvastatin 40 milliGRAM(s) Oral at bedtime  calcium acetate 667 milliGRAM(s) Oral three times a day with meals  ceFAZolin   IVPB 1000 milliGRAM(s) IV Intermittent every 24 hours  cloNIDine 0.1 milliGRAM(s) Oral every 12 hours  clopidogrel Tablet 75 milliGRAM(s) Oral daily  dextrose 5%. 1000 milliLiter(s) IV Continuous <Continuous>  dextrose 5%. 1000 milliLiter(s) IV Continuous <Continuous>  dextrose 50% Injectable 25 Gram(s) IV Push once  dextrose 50% Injectable 12.5 Gram(s) IV Push once  dextrose 50% Injectable 25 Gram(s) IV Push once  dextrose Oral Gel 15 Gram(s) Oral once PRN  diltiazem    Tablet 60 milliGRAM(s) Oral every 8 hours  epoetin fawad-epbx (RETACRIT) Injectable 99785 Unit(s) IV Push <User Schedule>  glucagon  Injectable 1 milliGRAM(s) IntraMuscular once  glucagon  Injectable 1 milliGRAM(s) IntraMuscular once  heparin   Injectable 5000 Unit(s) SubCutaneous every 12 hours  imipramine 50 milliGRAM(s) Oral daily  insulin glargine Injectable (LANTUS) 10 Unit(s) SubCutaneous at bedtime  insulin lispro (ADMELOG) corrective regimen sliding scale   SubCutaneous three times a day before meals  insulin lispro (ADMELOG) corrective regimen sliding scale   SubCutaneous at bedtime  lactobacillus acidophilus 1 Tablet(s) Oral two times a day  melatonin 3 milliGRAM(s) Oral at bedtime PRN  metoprolol tartrate 100 milliGRAM(s) Oral every 12 hours  multivitamin 1 Tablet(s) Oral daily  ondansetron Injectable 4 milliGRAM(s) IV Push every 8 hours PRN  pantoprazole    Tablet 40 milliGRAM(s) Oral before breakfast  senna 2 Tablet(s) Oral at bedtime  traMADol 50 milliGRAM(s) Oral three times a day PRN              Vital Signs Last 24 Hrs  T(C): 37.1 (25 Apr 2022 04:27), Max: 37.1 (25 Apr 2022 04:27)  T(F): 98.7 (25 Apr 2022 04:27), Max: 98.7 (25 Apr 2022 04:27)  HR: 66 (25 Apr 2022 04:27) (62 - 66)  BP: 154/59 (25 Apr 2022 04:27) (111/59 - 154/59)  BP(mean): --  RR: 18 (25 Apr 2022 04:27) (17 - 18)  SpO2: 95% (25 Apr 2022 04:27) (90% - 97%)    REVIEW OF SYSTEMS:  Constitutional:  No fever, chills, or night sweats.  Head:  No headaches.  Eyes:  No double vision or blurry vision.  Ears: No ringing in the ears.  Neck:  No neck pain.  Cardiovascular:  No chest pain.  Respiratory:  No shortness of breath.  Abdomen:  No nausea, vomiting, or abdominal pain.  Extremities/Neurological:  Positive numbness and tingling in bilateral feet.  Spine:  No history of back pain.    PHYSICAL EXAMINATION:    HEENT:  Head:  Normocephalic, atraumatic.  Eyes:  No scleral icterus.  Ears:  Hearing bilaterally intact.  NECK:  Supple.  RESPIRATORY:  Good air entry bilaterally.  CARDIOVASCULAR:  S1 and S2 heard.  ABDOMEN:  Soft, nontender.  EXTREMITIES:  No clubbing or cyanosis was noted.  Positive sign of scars and blood on the bottom of her feet from scrapes.      NEUROLOGIC:  The patient is awake and alert.  Extraocular movements were intact.  Upon conversing with the patient, at times, she may be slightly forgetful, but as per my conversation with the spouse, he has noticed that lately as well.  Speech was fluent.  Smile was symmetric.  Full visual fields.  Motor:  Bilateral upper 5/5.  Bilateral lower 4/5.  The patient had markedly impaired proprioception in bilateral lower extremities.  Knee jerks bilaterally were trace, suspect this could be secondary to diabetes and possibly peripheral neuropathy.                   LABS:  CBC Full  -  ( 23 Apr 2022 09:21 )  WBC Count : 11.24 K/uL  RBC Count : 3.08 M/uL  Hemoglobin : 9.5 g/dL  Hematocrit : 30.3 %  Platelet Count - Automated : 445 K/uL  Mean Cell Volume : 98.4 fl  Mean Cell Hemoglobin : 30.8 pg  Mean Cell Hemoglobin Concentration : 31.4 gm/dL  Auto Neutrophil # : 8.30 K/uL  Auto Lymphocyte # : 1.06 K/uL  Auto Monocyte # : 1.46 K/uL  Auto Eosinophil # : 0.20 K/uL  Auto Basophil # : 0.08 K/uL  Auto Neutrophil % : 73.9 %  Auto Lymphocyte % : 9.4 %  Auto Monocyte % : 13.0 %  Auto Eosinophil % : 1.8 %  Auto Basophil % : 0.7 %      04-23    134<L>  |  92<L>  |  74<H>  ----------------------------<  161<H>  5.4<H>   |  25  |  8.70<H>    Ca    9.3      23 Apr 2022 09:21    TPro  6.8  /  Alb  2.7<L>  /  TBili  0.4  /  DBili  x   /  AST  22  /  ALT  <6<L>  /  AlkPhos  93  04-23    Hemoglobin A1C:     LIVER FUNCTIONS - ( 23 Apr 2022 09:21 )  Alb: 2.7 g/dL / Pro: 6.8 g/dL / ALK PHOS: 93 U/L / ALT: <6 U/L / AST: 22 U/L / GGT: x           Vitamin B12         RADIOLOGY      ANALYSIS AND PLAN:  This is a 71-year-old with a history of difficulty ambulating, ataxia, and falls.  For ataxia and falls, suspect this is multifactorial secondary to diabetes and end-stage kidney disease and deconditioning, age-related changes leading to peripheral neuropathy type of component as well.  Suspect less likely this is a primary central nervous system event secondary to as per the patient and her spouse both legs become weak.  This points toward more a peripheral type of etiology.  Would recommend physical therapy evaluation.  Fall precaution.  For history of diabetes, strict control of blood sugars.  For history of hypertension, monitor systolic blood pressure.  For hyperlipidemia, continue the patient on statin.  The patient appears to be on multiple blood thinning medications, would recommend risks versus benefits must be weighed in regards to the patient's history of falls.  For possibly mild cognitive impairment  ESRD nephology follow up   do to high risk of fall medical team noted KIT hansen on antiplatelets     Spoke with spouse, Geoffrey, at [phone] 4/24 today and the patient in great detail.  Patient now stating she does not want to go to rehab she wants to she how she does with physical therapy here     Greater than 34 minutes of time was spent with the patient, plan of care, reviewing data, with greater than 50% of the visit was spent counseling and/or coordinating care with multidisciplinary healthcare team

## 2022-04-25 NOTE — PROGRESS NOTE ADULT - SUBJECTIVE AND OBJECTIVE BOX
Patient is a 71y Female whom presented to the hospital with esrd on hd    PAST MEDICAL & SURGICAL HISTORY:  Diabetes mellitus II    HTN (hypertension)    h/o Anxiety attack    Depression    h/o Myocardial infarct 2007    CAD (coronary artery disease)    h/o Hepatitis A 1969  currently resolved, no symptoms    PAD (peripheral artery disease)    Murmur, cardiac    h/o Smoking  quitted 3/2012    CRF (chronic renal failure), unspecified stage    Dialysis patient    Anemia secondary to renal failure    HTN (hypertension)    coronary stent 2007    s/p Ovarian cyst removal    s/p surgical removal of benign Skin lesion epigastric area        MEDICATIONS  (STANDING):  aspirin enteric coated 81 milliGRAM(s) Oral daily  atorvastatin 40 milliGRAM(s) Oral at bedtime  calcium acetate 667 milliGRAM(s) Oral three times a day with meals  ceFAZolin   IVPB 1000 milliGRAM(s) IV Intermittent every 24 hours  cloNIDine 0.1 milliGRAM(s) Oral every 12 hours  clopidogrel Tablet 75 milliGRAM(s) Oral daily  dextrose 5%. 1000 milliLiter(s) (100 mL/Hr) IV Continuous <Continuous>  dextrose 5%. 1000 milliLiter(s) (50 mL/Hr) IV Continuous <Continuous>  dextrose 50% Injectable 25 Gram(s) IV Push once  dextrose 50% Injectable 12.5 Gram(s) IV Push once  dextrose 50% Injectable 25 Gram(s) IV Push once  diltiazem    Tablet 60 milliGRAM(s) Oral every 8 hours  glucagon  Injectable 1 milliGRAM(s) IntraMuscular once  heparin   Injectable 5000 Unit(s) SubCutaneous every 12 hours  imipramine 50 milliGRAM(s) Oral daily  insulin lispro (ADMELOG) corrective regimen sliding scale   SubCutaneous three times a day before meals  insulin lispro (ADMELOG) corrective regimen sliding scale   SubCutaneous at bedtime  lactobacillus acidophilus 1 Tablet(s) Oral two times a day  metoprolol tartrate 100 milliGRAM(s) Oral every 12 hours  multivitamin 1 Tablet(s) Oral daily  pantoprazole    Tablet 40 milliGRAM(s) Oral before breakfast  senna 2 Tablet(s) Oral at bedtime      Allergies    latex (Unknown)  No Known Drug Allergies    Intolerances        SOCIAL HISTORY:  Denies ETOh,Smoking,     FAMILY HISTORY:      REVIEW OF SYSTEMS:    CONSTITUTIONAL: No weakness, fevers or chills  RESPIRATORY: No cough, wheezing, hemoptysis; No shortness of breath  CARDIOVASCULAR: No chest pain or palpitations  GASTROINTESTINAL: No abdominal or epigastric pain. No nausea, vomiting,                                    9.5    11.02 )-----------( 437      ( 25 Apr 2022 09:08 )             29.8       CBC Full  -  ( 25 Apr 2022 09:08 )  WBC Count : 11.02 K/uL  RBC Count : 3.03 M/uL  Hemoglobin : 9.5 g/dL  Hematocrit : 29.8 %  Platelet Count - Automated : 437 K/uL  Mean Cell Volume : 98.3 fl  Mean Cell Hemoglobin : 31.4 pg  Mean Cell Hemoglobin Concentration : 31.9 gm/dL  Auto Neutrophil # : x  Auto Lymphocyte # : x  Auto Monocyte # : x  Auto Eosinophil # : x  Auto Basophil # : x  Auto Neutrophil % : x  Auto Lymphocyte % : x  Auto Monocyte % : x  Auto Eosinophil % : x  Auto Basophil % : x      04-25    132<L>  |  93<L>  |  65<H>  ----------------------------<  262<H>  5.2   |  29  |  6.50<H>    Ca    9.8      25 Apr 2022 09:08        CAPILLARY BLOOD GLUCOSE      POCT Blood Glucose.: 303 mg/dL (25 Apr 2022 11:28)  POCT Blood Glucose.: 254 mg/dL (25 Apr 2022 07:42)  POCT Blood Glucose.: 375 mg/dL (24 Apr 2022 21:06)  POCT Blood Glucose.: 185 mg/dL (24 Apr 2022 16:42)      Vital Signs Last 24 Hrs  T(C): 36.7 (25 Apr 2022 12:40), Max: 37.1 (25 Apr 2022 04:27)  T(F): 98.1 (25 Apr 2022 12:40), Max: 98.7 (25 Apr 2022 04:27)  HR: 67 (25 Apr 2022 13:12) (61 - 67)  BP: 142/58 (25 Apr 2022 13:12) (111/59 - 154/59)  BP(mean): --  RR: 18 (25 Apr 2022 12:40) (17 - 18)  SpO2: 93% (25 Apr 2022 12:40) (90% - 97%)              PHYSICAL EXAM:    Constitutional: NAD  HEENT: conjunctive   clear   Neck:  No JVD  Respiratory: CTAB  Cardiovascular: S1 and S2  Gastrointestinal: BS+, soft, NT/ND  Extremities: No peripheral edema  Neurological: A/O x 3, no focal deficits

## 2022-04-26 LAB
ANION GAP SERPL CALC-SCNC: 14 MMOL/L — SIGNIFICANT CHANGE UP (ref 5–17)
BUN SERPL-MCNC: 88 MG/DL — HIGH (ref 7–23)
CALCIUM SERPL-MCNC: 10.3 MG/DL — HIGH (ref 8.5–10.1)
CHLORIDE SERPL-SCNC: 92 MMOL/L — LOW (ref 96–108)
CO2 SERPL-SCNC: 24 MMOL/L — SIGNIFICANT CHANGE UP (ref 22–31)
CREAT SERPL-MCNC: 7.6 MG/DL — HIGH (ref 0.5–1.3)
EGFR: 5 ML/MIN/1.73M2 — LOW
GLUCOSE SERPL-MCNC: 235 MG/DL — HIGH (ref 70–99)
HCT VFR BLD CALC: 29.7 % — LOW (ref 34.5–45)
HGB BLD-MCNC: 9.5 G/DL — LOW (ref 11.5–15.5)
MCHC RBC-ENTMCNC: 31.3 PG — SIGNIFICANT CHANGE UP (ref 27–34)
MCHC RBC-ENTMCNC: 32 GM/DL — SIGNIFICANT CHANGE UP (ref 32–36)
MCV RBC AUTO: 97.7 FL — SIGNIFICANT CHANGE UP (ref 80–100)
NRBC # BLD: 0 /100 WBCS — SIGNIFICANT CHANGE UP (ref 0–0)
PLATELET # BLD AUTO: 426 K/UL — HIGH (ref 150–400)
POTASSIUM SERPL-MCNC: 5.3 MMOL/L — SIGNIFICANT CHANGE UP (ref 3.5–5.3)
POTASSIUM SERPL-SCNC: 5.3 MMOL/L — SIGNIFICANT CHANGE UP (ref 3.5–5.3)
RBC # BLD: 3.04 M/UL — LOW (ref 3.8–5.2)
RBC # FLD: 18 % — HIGH (ref 10.3–14.5)
SODIUM SERPL-SCNC: 130 MMOL/L — LOW (ref 135–145)
WBC # BLD: 12.32 K/UL — HIGH (ref 3.8–10.5)
WBC # FLD AUTO: 12.32 K/UL — HIGH (ref 3.8–10.5)

## 2022-04-26 RX ORDER — POVIDONE-IODINE 5 %
1 AEROSOL (ML) TOPICAL DAILY
Refills: 0 | Status: DISCONTINUED | OUTPATIENT
Start: 2022-04-26 | End: 2022-05-02

## 2022-04-26 RX ADMIN — HEPARIN SODIUM 5000 UNIT(S): 5000 INJECTION INTRAVENOUS; SUBCUTANEOUS at 19:41

## 2022-04-26 RX ADMIN — Medication 667 MILLIGRAM(S): at 07:57

## 2022-04-26 RX ADMIN — Medication 60 MILLIGRAM(S): at 16:06

## 2022-04-26 RX ADMIN — Medication 6: at 07:57

## 2022-04-26 RX ADMIN — Medication 1: at 21:56

## 2022-04-26 RX ADMIN — HEPARIN SODIUM 5000 UNIT(S): 5000 INJECTION INTRAVENOUS; SUBCUTANEOUS at 07:57

## 2022-04-26 RX ADMIN — Medication 0.1 MILLIGRAM(S): at 17:19

## 2022-04-26 RX ADMIN — Medication 100 MILLIGRAM(S): at 17:20

## 2022-04-26 RX ADMIN — Medication 1 TABLET(S): at 17:20

## 2022-04-26 RX ADMIN — Medication 60 MILLIGRAM(S): at 05:58

## 2022-04-26 RX ADMIN — TRAMADOL HYDROCHLORIDE 50 MILLIGRAM(S): 50 TABLET ORAL at 03:47

## 2022-04-26 RX ADMIN — Medication 50 MILLIGRAM(S): at 16:05

## 2022-04-26 RX ADMIN — Medication 60 MILLIGRAM(S): at 21:55

## 2022-04-26 RX ADMIN — INSULIN GLARGINE 10 UNIT(S): 100 INJECTION, SOLUTION SUBCUTANEOUS at 21:55

## 2022-04-26 RX ADMIN — Medication 100 MILLIGRAM(S): at 05:58

## 2022-04-26 RX ADMIN — Medication 100 MILLIGRAM(S): at 18:52

## 2022-04-26 RX ADMIN — SENNA PLUS 2 TABLET(S): 8.6 TABLET ORAL at 21:54

## 2022-04-26 RX ADMIN — Medication 3 MILLIGRAM(S): at 22:03

## 2022-04-26 RX ADMIN — Medication 4: at 17:20

## 2022-04-26 RX ADMIN — ATORVASTATIN CALCIUM 40 MILLIGRAM(S): 80 TABLET, FILM COATED ORAL at 21:54

## 2022-04-26 RX ADMIN — Medication 81 MILLIGRAM(S): at 16:05

## 2022-04-26 RX ADMIN — PANTOPRAZOLE SODIUM 40 MILLIGRAM(S): 20 TABLET, DELAYED RELEASE ORAL at 06:00

## 2022-04-26 RX ADMIN — ERYTHROPOIETIN 10000 UNIT(S): 10000 INJECTION, SOLUTION INTRAVENOUS; SUBCUTANEOUS at 14:13

## 2022-04-26 RX ADMIN — Medication 667 MILLIGRAM(S): at 17:20

## 2022-04-26 RX ADMIN — Medication 1 TABLET(S): at 05:58

## 2022-04-26 RX ADMIN — Medication 1 TABLET(S): at 16:05

## 2022-04-26 RX ADMIN — CLOPIDOGREL BISULFATE 75 MILLIGRAM(S): 75 TABLET, FILM COATED ORAL at 16:05

## 2022-04-26 RX ADMIN — Medication 0.1 MILLIGRAM(S): at 05:59

## 2022-04-26 RX ADMIN — TRAMADOL HYDROCHLORIDE 50 MILLIGRAM(S): 50 TABLET ORAL at 04:47

## 2022-04-26 NOTE — CHART NOTE - NSCHARTNOTEFT_GEN_A_CORE
Assessment: patient seen for malnutrition follow up   71y old  Female who presents with a chief complaint of falls ,weakness   patient off the floor at dialysis   per RN patient with good PO intake        Factors impacting intake: [x ] none [ ] nausea  [ ] vomiting [ ] diarrhea [ ] constipation  [ ]chewing problems [ ] swallowing issues  [ ] other:     Diet Prescription: Diet, Consistent Carbohydrate Renal w/Evening Snack:   Easy to Chew (EASYTOCHEW)  Abel(7 Gm Arginine/7 Gm Glut/1.2 Gm HMB     Qty per Day:  2  Supplement Feeding Modality:  Oral  Nepro Cans or Servings Per Day:  1       Frequency:  Two Times a day (04-23-22 @ 12:02)    Intake: 51-75% per flow sheet    Current Weight: Weight pre # 4/26 134# admit      Pertinent Medications: MEDICATIONS  (STANDING):  aspirin enteric coated 81 milliGRAM(s) Oral daily  atorvastatin 40 milliGRAM(s) Oral at bedtime  calcium acetate 667 milliGRAM(s) Oral three times a day with meals  ceFAZolin   IVPB 1000 milliGRAM(s) IV Intermittent every 24 hours  cloNIDine 0.1 milliGRAM(s) Oral every 12 hours  clopidogrel Tablet 75 milliGRAM(s) Oral daily  dextrose 5%. 1000 milliLiter(s) (100 mL/Hr) IV Continuous <Continuous>  dextrose 5%. 1000 milliLiter(s) (50 mL/Hr) IV Continuous <Continuous>  dextrose 50% Injectable 25 Gram(s) IV Push once  dextrose 50% Injectable 12.5 Gram(s) IV Push once  dextrose 50% Injectable 25 Gram(s) IV Push once  diltiazem    Tablet 60 milliGRAM(s) Oral every 8 hours  epoetin fawad-epbx (RETACRIT) Injectable 71313 Unit(s) IV Push <User Schedule>  glucagon  Injectable 1 milliGRAM(s) IntraMuscular once  glucagon  Injectable 1 milliGRAM(s) IntraMuscular once  heparin   Injectable 5000 Unit(s) SubCutaneous every 12 hours  imipramine 50 milliGRAM(s) Oral daily  insulin glargine Injectable (LANTUS) 10 Unit(s) SubCutaneous at bedtime  insulin lispro (ADMELOG) corrective regimen sliding scale   SubCutaneous three times a day before meals  insulin lispro (ADMELOG) corrective regimen sliding scale   SubCutaneous at bedtime  lactobacillus acidophilus 1 Tablet(s) Oral two times a day  metoprolol tartrate 100 milliGRAM(s) Oral every 12 hours  multivitamin 1 Tablet(s) Oral daily  pantoprazole    Tablet 40 milliGRAM(s) Oral before breakfast  senna 2 Tablet(s) Oral at bedtime    MEDICATIONS  (PRN):  acetaminophen     Tablet .. 650 milliGRAM(s) Oral every 6 hours PRN Temp greater or equal to 38C (100.4F), Mild Pain (1 - 3)  aluminum hydroxide/magnesium hydroxide/simethicone Suspension 30 milliLiter(s) Oral every 4 hours PRN Dyspepsia  dextrose Oral Gel 15 Gram(s) Oral once PRN Blood Glucose LESS THAN 70 milliGRAM(s)/deciliter  melatonin 3 milliGRAM(s) Oral at bedtime PRN Insomnia  ondansetron Injectable 4 milliGRAM(s) IV Push every 8 hours PRN Nausea and/or Vomiting  traMADol 50 milliGRAM(s) Oral three times a day PRN Moderate Pain (4 - 6)      Skin: multiple un-stageable toes . and DTI    Estimated Needs:   [ x] no change since previous assessment  [ ] recalculated:     Previous Nutrition Diagnosis:   [ ] Inadequate Energy Intake [ ]Inadequate Oral Intake [ ] Excessive Energy Intake   [ ] Underweight [ ] Increased Nutrient Needs [ ] Overweight/Obesity   [ ] Altered GI Function [ ] Unintended Weight Loss [ ] Food & Nutrition Related Knowledge Deficit [x ] Malnutrition     Nutrition Diagnosis is [x ] ongoing  [ ] resolved [ ] not applicable     New Nutrition Diagnosis: [x ] not applicable       Interventions:   Recommend  [ ] Change Diet To:  [ ] Nutrition Supplement  [ ] Nutrition Support  [x ] Other: continue diet as ordered with supplements, follow blood sugars     Monitoring and Evaluation:   [x ] PO intake [ x ] Tolerance to diet prescription [ x ] weights [ x ] labs[ x ] follow up per protocol  [ ] other:

## 2022-04-26 NOTE — PROGRESS NOTE ADULT - SUBJECTIVE AND OBJECTIVE BOX
PROGRESS NOTE  Patient is a 71y old  Female who presents with a chief complaint of falls ,weakness (26 Apr 2022 11:42)    Chart and available morning labs /imaging are reviewed electronically , urgent issues addressed . More information  is being added upon completion of rounds , when more information is collected and management discussed with consultants , medical staff and social service/case management on the floor   OVERNIGHT  No new issues reported by medical staff . All above noted Patient is resting in a bed comfortably .Capacity eval requested since patient keeps adamantly refusing [facility] [employer] and insists on d/c home .D/C PLAN d/w  [facility] and [facility]  .No distress noted     HPI:  Patient came to ED because she  fell today on her way to dialysis, pt states that she is falling everyday and always feels weak. pt poor historian, missed dialysis today, will need admission for placement. Recent admission 04/05/22 -70yo female bib ems with sob, pt states she has missed the last 2 rounds of dialysis and is due today, and has been having worsening sob, no cough, fever, chills, no chest pain no other complaints .Found to have hyperkalemia Diagnosed with foot infection MRI showed evidence of acute osteomyelitis of L medial malleolus and distal aspect of R 1st toe ,poa . -ID recommended  cefazolin 2g-2g-3g with HD x 4 weeks .Patient was discharged home with home care and iv abx at HD center Palliative care consult requested ,to discuss advance directives and complete MOLST  (22 Apr 2022 16:22)    PAST MEDICAL & SURGICAL HISTORY:  Diabetes mellitus II    HTN (hypertension)    h/o Anxiety attack    Depression    h/o Myocardial infarct 2007    CAD (coronary artery disease)    h/o Hepatitis A 1969  currently resolved, no symptoms    PAD (peripheral artery disease)    Murmur, cardiac    h/o Smoking  quitted 3/2012    CRF (chronic renal failure), unspecified stage    Dialysis patient    HTN (hypertension)    Anemia secondary to renal failure    coronary stent 2007    s/p Ovarian cyst removal    s/p surgical removal of benign Skin lesion epigastric area        MEDICATIONS  (STANDING):  aspirin enteric coated 81 milliGRAM(s) Oral daily  atorvastatin 40 milliGRAM(s) Oral at bedtime  calcium acetate 667 milliGRAM(s) Oral three times a day with meals  ceFAZolin   IVPB 1000 milliGRAM(s) IV Intermittent every 24 hours  cloNIDine 0.1 milliGRAM(s) Oral every 12 hours  clopidogrel Tablet 75 milliGRAM(s) Oral daily  dextrose 5%. 1000 milliLiter(s) (100 mL/Hr) IV Continuous <Continuous>  dextrose 5%. 1000 milliLiter(s) (50 mL/Hr) IV Continuous <Continuous>  dextrose 50% Injectable 25 Gram(s) IV Push once  dextrose 50% Injectable 12.5 Gram(s) IV Push once  dextrose 50% Injectable 25 Gram(s) IV Push once  diltiazem    Tablet 60 milliGRAM(s) Oral every 8 hours  epoetin fawad-epbx (RETACRIT) Injectable 10623 Unit(s) IV Push <User Schedule>  glucagon  Injectable 1 milliGRAM(s) IntraMuscular once  glucagon  Injectable 1 milliGRAM(s) IntraMuscular once  heparin   Injectable 5000 Unit(s) SubCutaneous every 12 hours  imipramine 50 milliGRAM(s) Oral daily  insulin glargine Injectable (LANTUS) 10 Unit(s) SubCutaneous at bedtime  insulin lispro (ADMELOG) corrective regimen sliding scale   SubCutaneous at bedtime  insulin lispro (ADMELOG) corrective regimen sliding scale   SubCutaneous three times a day before meals  lactobacillus acidophilus 1 Tablet(s) Oral two times a day  metoprolol tartrate 100 milliGRAM(s) Oral every 12 hours  multivitamin 1 Tablet(s) Oral daily  pantoprazole    Tablet 40 milliGRAM(s) Oral before breakfast  povidone iodine 10% Solution 1 Application(s) Topical daily  senna 2 Tablet(s) Oral at bedtime    MEDICATIONS  (PRN):  acetaminophen     Tablet .. 650 milliGRAM(s) Oral every 6 hours PRN Temp greater or equal to 38C (100.4F), Mild Pain (1 - 3)  aluminum hydroxide/magnesium hydroxide/simethicone Suspension 30 milliLiter(s) Oral every 4 hours PRN Dyspepsia  dextrose Oral Gel 15 Gram(s) Oral once PRN Blood Glucose LESS THAN 70 milliGRAM(s)/deciliter  melatonin 3 milliGRAM(s) Oral at bedtime PRN Insomnia  ondansetron Injectable 4 milliGRAM(s) IV Push every 8 hours PRN Nausea and/or Vomiting  traMADol 50 milliGRAM(s) Oral three times a day PRN Moderate Pain (4 - 6)      OBJECTIVE    T(C): 37 (04-26-22 @ 20:20), Max: 37 (04-26-22 @ 20:20)  HR: 77 (04-26-22 @ 20:20) (52 - 85)  BP: 167/63 (04-26-22 @ 20:20) (115/60 - 170/70)  RR: 17 (04-26-22 @ 20:20) (16 - 18)  SpO2: 92% (04-26-22 @ 20:20) (92% - 96%)  Wt(kg): --  I&O's Summary    25 Apr 2022 07:01  -  26 Apr 2022 07:00  --------------------------------------------------------  IN: 120 mL / OUT: 0 mL / NET: 120 mL    26 Apr 2022 07:01  -  26 Apr 2022 20:28  --------------------------------------------------------  IN: 0 mL / OUT: 1000 mL / NET: -1000 mL          REVIEW OF SYSTEMS:  CONSTITUTIONAL: No fever, weight loss, or fatigue  EYES: No eye pain, visual disturbances, or discharge  ENMT:   No sinus or throat pain  NECK: No pain or stiffness  RESPIRATORY: No cough, wheezing, chills or hemoptysis; No shortness of breath  CARDIOVASCULAR: No chest pain, palpitations, dizziness, or leg swelling  GASTROINTESTINAL: No abdominal pain. No nausea, vomiting; No diarrhea or constipation. No melena or hematochezia.  GENITOURINARY: No dysuria, frequency, hematuria, or incontinence  NEUROLOGICAL: No headaches, memory loss, loss of strength, numbness, or tremors  SKIN: No itching, burning, rashes, or lesions   MUSCULOSKELETAL: No joint pain or swelling; No muscle, back, or extremity pain    PHYSICAL EXAM:  Appearance: NAD. VS past 24 hrs -as above   HEENT:   Moist oral mucosa. Conjunctiva clear b/l.   Neck : supple  Respiratory: Lungs CTAB.  Gastrointestinal:  Soft, nontender. No rebound. No rigidity. BS present	  Cardiovascular: RRR ,S1S2 present  Neurologic: Non-focal. Moving all extremities.  Extremities: No edema. No erythema. No calf tenderness.  Skin: No rashes, No ecchymoses, No cyanosis.	  wounds ,skin lesions-See skin assesment flow sheet   LABS:                        9.5    12.32 )-----------( 426      ( 26 Apr 2022 09:27 )             29.7     04-26    130<L>  |  92<L>  |  88<H>  ----------------------------<  235<H>  5.3   |  24  |  7.60<H>    Ca    10.3<H>      26 Apr 2022 09:27      CAPILLARY BLOOD GLUCOSE      POCT Blood Glucose.: 205 mg/dL (26 Apr 2022 16:37)  POCT Blood Glucose.: 145 mg/dL (26 Apr 2022 14:33)  POCT Blood Glucose.: 260 mg/dL (26 Apr 2022 07:39)  POCT Blood Glucose.: 297 mg/dL (25 Apr 2022 21:00)          Culture - Blood (collected 22 Apr 2022 18:38)  Source: .Blood Blood-Peripheral  Preliminary Report (23 Apr 2022 19:02):    No growth to date.    Culture - Blood (collected 22 Apr 2022 18:38)  Source: .Blood Blood-Peripheral  Preliminary Report (23 Apr 2022 19:02):    No growth to date.      RADIOLOGY & ADDITIONAL TESTS:   reviewed elctronically  ASSESSMENT/PLAN: 	    25 minutes aggregate time was spent on this visit, 50% visit time spent in care co-ordination with other attendings and counselling patient .I have discussed care plan with patient / HCP/family member ,who expressed understanding of problems treatment and their effect and side effects, alternatives in details. I have asked if they have any questions and concerns and appropriately addressed them to best of my ability.

## 2022-04-26 NOTE — PROGRESS NOTE ADULT - SUBJECTIVE AND OBJECTIVE BOX
Patient is a 71y Female whom presented to the hospital with esrd on hd    PAST MEDICAL & SURGICAL HISTORY:  Diabetes mellitus II    HTN (hypertension)    h/o Anxiety attack    Depression    h/o Myocardial infarct 2007    CAD (coronary artery disease)    h/o Hepatitis A 1969  currently resolved, no symptoms    PAD (peripheral artery disease)    Murmur, cardiac    h/o Smoking  quitted 3/2012    CRF (chronic renal failure), unspecified stage    Dialysis patient    Anemia secondary to renal failure    HTN (hypertension)    coronary stent 2007    s/p Ovarian cyst removal    s/p surgical removal of benign Skin lesion epigastric area        MEDICATIONS  (STANDING):  aspirin enteric coated 81 milliGRAM(s) Oral daily  atorvastatin 40 milliGRAM(s) Oral at bedtime  calcium acetate 667 milliGRAM(s) Oral three times a day with meals  ceFAZolin   IVPB 1000 milliGRAM(s) IV Intermittent every 24 hours  cloNIDine 0.1 milliGRAM(s) Oral every 12 hours  clopidogrel Tablet 75 milliGRAM(s) Oral daily  dextrose 5%. 1000 milliLiter(s) (100 mL/Hr) IV Continuous <Continuous>  dextrose 5%. 1000 milliLiter(s) (50 mL/Hr) IV Continuous <Continuous>  dextrose 50% Injectable 25 Gram(s) IV Push once  dextrose 50% Injectable 12.5 Gram(s) IV Push once  dextrose 50% Injectable 25 Gram(s) IV Push once  diltiazem    Tablet 60 milliGRAM(s) Oral every 8 hours  glucagon  Injectable 1 milliGRAM(s) IntraMuscular once  heparin   Injectable 5000 Unit(s) SubCutaneous every 12 hours  imipramine 50 milliGRAM(s) Oral daily  insulin lispro (ADMELOG) corrective regimen sliding scale   SubCutaneous three times a day before meals  insulin lispro (ADMELOG) corrective regimen sliding scale   SubCutaneous at bedtime  lactobacillus acidophilus 1 Tablet(s) Oral two times a day  metoprolol tartrate 100 milliGRAM(s) Oral every 12 hours  multivitamin 1 Tablet(s) Oral daily  pantoprazole    Tablet 40 milliGRAM(s) Oral before breakfast  senna 2 Tablet(s) Oral at bedtime      Allergies    latex (Unknown)  No Known Drug Allergies    Intolerances        SOCIAL HISTORY:  Denies ETOh,Smoking,     FAMILY HISTORY:      REVIEW OF SYSTEMS:    CONSTITUTIONAL: No weakness, fevers or chills  RESPIRATORY: No cough, wheezing, hemoptysis; No shortness of breath  CARDIOVASCULAR: No chest pain or palpitations  GASTROINTESTINAL: No abdominal or epigastric pain. No nausea, vomiting,                                                      9.5    12.32 )-----------( 426      ( 26 Apr 2022 09:27 )             29.7       CBC Full  -  ( 26 Apr 2022 09:27 )  WBC Count : 12.32 K/uL  RBC Count : 3.04 M/uL  Hemoglobin : 9.5 g/dL  Hematocrit : 29.7 %  Platelet Count - Automated : 426 K/uL  Mean Cell Volume : 97.7 fl  Mean Cell Hemoglobin : 31.3 pg  Mean Cell Hemoglobin Concentration : 32.0 gm/dL  Auto Neutrophil # : x  Auto Lymphocyte # : x  Auto Monocyte # : x  Auto Eosinophil # : x  Auto Basophil # : x  Auto Neutrophil % : x  Auto Lymphocyte % : x  Auto Monocyte % : x  Auto Eosinophil % : x  Auto Basophil % : x      04-26    130<L>  |  92<L>  |  88<H>  ----------------------------<  235<H>  5.3   |  24  |  7.60<H>    Ca    10.3<H>      26 Apr 2022 09:27        CAPILLARY BLOOD GLUCOSE      POCT Blood Glucose.: 205 mg/dL (26 Apr 2022 16:37)  POCT Blood Glucose.: 145 mg/dL (26 Apr 2022 14:33)  POCT Blood Glucose.: 260 mg/dL (26 Apr 2022 07:39)  POCT Blood Glucose.: 297 mg/dL (25 Apr 2022 21:00)      Vital Signs Last 24 Hrs  T(C): 36.4 (26 Apr 2022 15:44), Max: 36.8 (26 Apr 2022 05:00)  T(F): 97.5 (26 Apr 2022 15:44), Max: 98.2 (26 Apr 2022 05:00)  HR: 73 (26 Apr 2022 17:18) (52 - 85)  BP: 167/71 (26 Apr 2022 17:18) (115/60 - 170/70)  BP(mean): --  RR: 17 (26 Apr 2022 15:31) (16 - 18)  SpO2: 93% (26 Apr 2022 15:31) (92% - 96%)                  PHYSICAL EXAM:    Constitutional: NAD  HEENT: conjunctive   clear   Neck:  No JVD  Respiratory: CTAB  Cardiovascular: S1 and S2  Gastrointestinal: BS+, soft, NT/ND  Extremities: No peripheral edema  Neurological: A/O x 3, no focal deficits

## 2022-04-26 NOTE — PROGRESS NOTE ADULT - SUBJECTIVE AND OBJECTIVE BOX
Patient is a 71y Female with a known history of :  Falls [W19.XXXA]    DM (diabetes mellitus) [E11.9]    Acute osteomyelitis [M86.10]    ESRD on dialysis [N18.6]    Anemia secondary to renal failure [N18.9]    Atrial fibrillation [I48.91]    PAD (peripheral artery disease) [I73.9]    Non-compliant patient [Z91.19]    Prophylactic measure [Z29.9]    Need for follow-up by  [Z78.9]    Gangrenous toe [I96]    Chronic osteomyelitis [M86.60]      HPI:  Patient came to ED because she  fell today on her way to dialysis, pt states that she is falling everyday and always feels weak. pt poor historian, missed dialysis today, will need admission for placement. Recent admission 04/05/22 -70yo female bib ems with sob, pt states she has missed the last 2 rounds of dialysis and is due today, and has been having worsening sob, no cough, fever, chills, no chest pain no other complaints .Found to have hyperkalemia Diagnosed with foot infection MRI showed evidence of acute osteomyelitis of L medial malleolus and distal aspect of R 1st toe ,poa . -ID recommended  cefazolin 2g-2g-3g with HD x 4 weeks .Patient was discharged home with home care and iv abx at HD center Palliative care consult requested ,to discuss advance directives and complete MOLST  (22 Apr 2022 16:22)      REVIEW OF SYSTEMS:    CONSTITUTIONAL: No fever, weight loss, or fatigue  EYES: No eye pain, visual disturbances, or discharge  ENMT:  No difficulty hearing, tinnitus, vertigo; No sinus or throat pain  NECK: No pain or stiffness  BREASTS: No pain, masses, or nipple discharge  RESPIRATORY: No cough, wheezing, chills or hemoptysis; No shortness of breath  CARDIOVASCULAR: No chest pain, palpitations, dizziness, or leg swelling  GASTROINTESTINAL: No abdominal or epigastric pain. No nausea, vomiting, or hematemesis; No diarrhea or constipation. No melena or hematochezia.  GENITOURINARY: No dysuria, frequency, hematuria, or incontinence  NEUROLOGICAL: No headaches, memory loss, loss of strength, numbness, or tremors  SKIN: No itching, burning, rashes, or lesions   LYMPH NODES: No enlarged glands  ENDOCRINE: No heat or cold intolerance; No hair loss  MUSCULOSKELETAL: No joint pain or swelling; No muscle, back, or extremity pain  PSYCHIATRIC: No depression, anxiety, mood swings, or difficulty sleeping  HEME/LYMPH: No easy bruising, or bleeding gums  ALLERGY AND IMMUNOLOGIC: No hives or eczema    MEDICATIONS  (STANDING):  aspirin enteric coated 81 milliGRAM(s) Oral daily  atorvastatin 40 milliGRAM(s) Oral at bedtime  calcium acetate 667 milliGRAM(s) Oral three times a day with meals  ceFAZolin   IVPB 1000 milliGRAM(s) IV Intermittent every 24 hours  cloNIDine 0.1 milliGRAM(s) Oral every 12 hours  clopidogrel Tablet 75 milliGRAM(s) Oral daily  dextrose 5%. 1000 milliLiter(s) (100 mL/Hr) IV Continuous <Continuous>  dextrose 5%. 1000 milliLiter(s) (50 mL/Hr) IV Continuous <Continuous>  dextrose 50% Injectable 25 Gram(s) IV Push once  dextrose 50% Injectable 12.5 Gram(s) IV Push once  dextrose 50% Injectable 25 Gram(s) IV Push once  diltiazem    Tablet 60 milliGRAM(s) Oral every 8 hours  epoetin fawad-epbx (RETACRIT) Injectable 05301 Unit(s) IV Push <User Schedule>  glucagon  Injectable 1 milliGRAM(s) IntraMuscular once  glucagon  Injectable 1 milliGRAM(s) IntraMuscular once  heparin   Injectable 5000 Unit(s) SubCutaneous every 12 hours  imipramine 50 milliGRAM(s) Oral daily  insulin glargine Injectable (LANTUS) 10 Unit(s) SubCutaneous at bedtime  insulin lispro (ADMELOG) corrective regimen sliding scale   SubCutaneous three times a day before meals  insulin lispro (ADMELOG) corrective regimen sliding scale   SubCutaneous at bedtime  lactobacillus acidophilus 1 Tablet(s) Oral two times a day  metoprolol tartrate 100 milliGRAM(s) Oral every 12 hours  multivitamin 1 Tablet(s) Oral daily  pantoprazole    Tablet 40 milliGRAM(s) Oral before breakfast  senna 2 Tablet(s) Oral at bedtime    MEDICATIONS  (PRN):  acetaminophen     Tablet .. 650 milliGRAM(s) Oral every 6 hours PRN Temp greater or equal to 38C (100.4F), Mild Pain (1 - 3)  aluminum hydroxide/magnesium hydroxide/simethicone Suspension 30 milliLiter(s) Oral every 4 hours PRN Dyspepsia  dextrose Oral Gel 15 Gram(s) Oral once PRN Blood Glucose LESS THAN 70 milliGRAM(s)/deciliter  melatonin 3 milliGRAM(s) Oral at bedtime PRN Insomnia  ondansetron Injectable 4 milliGRAM(s) IV Push every 8 hours PRN Nausea and/or Vomiting  traMADol 50 milliGRAM(s) Oral three times a day PRN Moderate Pain (4 - 6)      ALLERGIES: latex (Unknown)  No Known Drug Allergies      FAMILY HISTORY:      PHYSICAL EXAMINATION:  -----------------------------  T(C): 36.8 (04-26-22 @ 05:00), Max: 36.8 (04-26-22 @ 05:00)  HR: 66 (04-26-22 @ 05:00) (61 - 85)  BP: 152/70 (04-26-22 @ 05:00) (118/80 - 152/70)  RR: 17 (04-26-22 @ 05:00) (17 - 18)  SpO2: 92% (04-26-22 @ 05:00) (92% - 93%)  Wt(kg): --    04-25 @ 07:01  -  04-26 @ 07:00  --------------------------------------------------------  IN:    Oral Fluid: 120 mL  Total IN: 120 mL    OUT:  Total OUT: 0 mL    Total NET: 120 mL            VITALS  T(C): 36.8 (04-26-22 @ 05:00), Max: 36.8 (04-26-22 @ 05:00)  HR: 66 (04-26-22 @ 05:00) (61 - 85)  BP: 152/70 (04-26-22 @ 05:00) (118/80 - 152/70)  RR: 17 (04-26-22 @ 05:00) (17 - 18)  SpO2: 92% (04-26-22 @ 05:00) (92% - 93%)    Constitutional: well developed, normal appearance, well groomed, well nourished, no deformities and no acute distress.   Eyes: the conjunctiva exhibited no abnormalities and the eyelids demonstrated no xanthelasmas.   HEENT: normal oral mucosa, no oral pallor and no oral cyanosis.   Neck: normal jugular venous A waves present, normal jugular venous V waves present and no jugular venous wilson A waves.   Pulmonary: no respiratory distress, normal respiratory rhythm and effort, no accessory muscle use and lungs were clear to auscultation bilaterally.   Cardiovascular: heart rate and rhythm were normal, normal S1 and S2 and no murmur, gallop, rub, heave or thrill are present.   Abdomen: soft, non-tender, no hepato-splenomegaly and no abdominal mass palpated.   Musculoskeletal: the gait could not be assessed..   Extremities: no clubbing of the fingernails, no localized cyanosis, no petechial hemorrhages and no ischemic changes.   Skin: normal skin color and pigmentation, no rash, no venous stasis, no skin lesions, no skin ulcer and no xanthoma was observed.   Psychiatric: oriented to person, place, and time, the affect was normal, the mood was normal and not feeling anxious.     LABS:   --------  04-25    132<L>  |  93<L>  |  65<H>  ----------------------------<  262<H>  5.2   |  29  |  6.50<H>    Ca    9.8      25 Apr 2022 09:08                           9.5    12.32 )-----------( 426      ( 26 Apr 2022 09:27 )             29.7                 RADIOLOGY:  -----------------    ECG:     ECHO:

## 2022-04-26 NOTE — PROGRESS NOTE ADULT - SUBJECTIVE AND OBJECTIVE BOX
Neurology follow up note    SHILO [id number]      Interval History:    Patient feels ok no new complaints.    Allergies    latex (Unknown)  No Known Drug Allergies    Intolerances        MEDICATIONS    acetaminophen     Tablet .. 650 milliGRAM(s) Oral every 6 hours PRN  aluminum hydroxide/magnesium hydroxide/simethicone Suspension 30 milliLiter(s) Oral every 4 hours PRN  aspirin enteric coated 81 milliGRAM(s) Oral daily  atorvastatin 40 milliGRAM(s) Oral at bedtime  calcium acetate 667 milliGRAM(s) Oral three times a day with meals  ceFAZolin   IVPB 1000 milliGRAM(s) IV Intermittent every 24 hours  cloNIDine 0.1 milliGRAM(s) Oral every 12 hours  clopidogrel Tablet 75 milliGRAM(s) Oral daily  dextrose 5%. 1000 milliLiter(s) IV Continuous <Continuous>  dextrose 5%. 1000 milliLiter(s) IV Continuous <Continuous>  dextrose 50% Injectable 25 Gram(s) IV Push once  dextrose 50% Injectable 12.5 Gram(s) IV Push once  dextrose 50% Injectable 25 Gram(s) IV Push once  dextrose Oral Gel 15 Gram(s) Oral once PRN  diltiazem    Tablet 60 milliGRAM(s) Oral every 8 hours  epoetin fawad-epbx (RETACRIT) Injectable 98629 Unit(s) IV Push <User Schedule>  glucagon  Injectable 1 milliGRAM(s) IntraMuscular once  glucagon  Injectable 1 milliGRAM(s) IntraMuscular once  heparin   Injectable 5000 Unit(s) SubCutaneous every 12 hours  imipramine 50 milliGRAM(s) Oral daily  insulin glargine Injectable (LANTUS) 10 Unit(s) SubCutaneous at bedtime  insulin lispro (ADMELOG) corrective regimen sliding scale   SubCutaneous three times a day before meals  insulin lispro (ADMELOG) corrective regimen sliding scale   SubCutaneous at bedtime  lactobacillus acidophilus 1 Tablet(s) Oral two times a day  melatonin 3 milliGRAM(s) Oral at bedtime PRN  metoprolol tartrate 100 milliGRAM(s) Oral every 12 hours  multivitamin 1 Tablet(s) Oral daily  ondansetron Injectable 4 milliGRAM(s) IV Push every 8 hours PRN  pantoprazole    Tablet 40 milliGRAM(s) Oral before breakfast  senna 2 Tablet(s) Oral at bedtime  traMADol 50 milliGRAM(s) Oral three times a day PRN              Vital Signs Last 24 Hrs  T(C): 36.8 (26 Apr 2022 05:00), Max: 36.8 (26 Apr 2022 05:00)  T(F): 98.2 (26 Apr 2022 05:00), Max: 98.2 (26 Apr 2022 05:00)  HR: 66 (26 Apr 2022 05:00) (61 - 85)  BP: 152/70 (26 Apr 2022 05:00) (118/80 - 152/70)  BP(mean): --  RR: 17 (26 Apr 2022 05:00) (17 - 18)  SpO2: 92% (26 Apr 2022 05:00) (92% - 93%)      REVIEW OF SYSTEMS:  Constitutional:  No fever, chills, or night sweats.  Head:  No headaches.  Eyes:  No double vision or blurry vision.  Ears: No ringing in the ears.  Neck:  No neck pain.  Cardiovascular:  No chest pain.  Respiratory:  No shortness of breath.  Abdomen:  No nausea, vomiting, or abdominal pain.  Extremities/Neurological:  Positive numbness and tingling in bilateral feet.  Spine:  No history of back pain.    PHYSICAL EXAMINATION:    HEENT:  Head:  Normocephalic, atraumatic.  Eyes:  No scleral icterus.  Ears:  Hearing bilaterally intact.  NECK:  Supple.  RESPIRATORY:  Good air entry bilaterally.  CARDIOVASCULAR:  S1 and S2 heard.  ABDOMEN:  Soft, nontender.  EXTREMITIES:  No clubbing or cyanosis was noted.  Positive sign of scars and blood on the bottom of her feet from scrapes.      NEUROLOGIC:  The patient is awake and alert.  Extraocular movements were intact.  Upon conversing with the patient, at times, she may be slightly forgetful, but as per my conversation with the spouse, he has noticed that lately as well.  Speech was fluent.  Smile was symmetric.  Full visual fields.  Motor:  Bilateral upper 5/5.  Bilateral lower 4/5.  The patient had markedly impaired proprioception in bilateral lower extremities.  Knee jerks bilaterally were trace, suspect this could be secondary to diabetes and possibly peripheral neuropathy.              LABS:  CBC Full  -  ( 25 Apr 2022 09:08 )  WBC Count : 11.02 K/uL  RBC Count : 3.03 M/uL  Hemoglobin : 9.5 g/dL  Hematocrit : 29.8 %  Platelet Count - Automated : 437 K/uL  Mean Cell Volume : 98.3 fl  Mean Cell Hemoglobin : 31.4 pg  Mean Cell Hemoglobin Concentration : 31.9 gm/dL  Auto Neutrophil # : x  Auto Lymphocyte # : x  Auto Monocyte # : x  Auto Eosinophil # : x  Auto Basophil # : x  Auto Neutrophil % : x  Auto Lymphocyte % : x  Auto Monocyte % : x  Auto Eosinophil % : x  Auto Basophil % : x      04-25    132<L>  |  93<L>  |  65<H>  ----------------------------<  262<H>  5.2   |  29  |  6.50<H>    Ca    9.8      25 Apr 2022 09:08      Hemoglobin A1C:       Vitamin B12         RADIOLOGY      ANALYSIS AND PLAN:  This is a 71-year-old with a history of difficulty ambulating, ataxia, and falls.  For ataxia and falls, suspect this is multifactorial secondary to diabetes and end-stage kidney disease and deconditioning, age-related changes leading to peripheral neuropathy type of component as well.  Suspect less likely this is a primary central nervous system event secondary to as per the patient and her spouse both legs become weak.  This points toward more a peripheral type of etiology.  Would recommend physical therapy evaluation.  Fall precaution.  For history of diabetes, strict control of blood sugars.  For history of hypertension, monitor systolic blood pressure.  For hyperlipidemia, continue the patient on statin.  The patient appears to be on multiple blood thinning medications, would recommend risks versus benefits must be weighed in regards to the patient's history of falls.  For possibly mild cognitive impairment  ESRD nephology follow up   do to high risk of fall medical team noted KIT hansen on antiplatelets     Spoke with spouse, Geoffrey, at [phone] 4/24 today and the patient in great detail.  Patient now stating she does not want to go to rehab she wants to she how she does with physical therapy here     Greater than 31 minutes of time was spent with the patient, plan of care, reviewing data, with greater than 50% of the visit was spent counseling and/or coordinating care with multidisciplinary healthcare team

## 2022-04-26 NOTE — PROGRESS NOTE ADULT - SUBJECTIVE AND OBJECTIVE BOX
Date/Time Patient Seen:  		  Referring MD:   Data Reviewed	       Patient is a 71y old  Female who presents with a chief complaint of falls ,weakness (25 Apr 2022 16:01)      Subjective/HPI     PAST MEDICAL & SURGICAL HISTORY:  Diabetes mellitus II    HTN (hypertension)    h/o Anxiety attack    Depression    h/o Myocardial infarct 2007    CAD (coronary artery disease)    CAD (coronary artery disease)    h/o Hepatitis A 1969  currently resolved, no symptoms    PAD (peripheral artery disease)    Murmur, cardiac    h/o Smoking  quitted 3/2012    CRF (chronic renal failure), unspecified stage    Dialysis patient    Anemia secondary to renal failure    HTN (hypertension)    coronary stent 2007    s/p Ovarian cyst removal    s/p surgical removal of benign Skin lesion epigastric area          Medication list         MEDICATIONS  (STANDING):  aspirin enteric coated 81 milliGRAM(s) Oral daily  atorvastatin 40 milliGRAM(s) Oral at bedtime  calcium acetate 667 milliGRAM(s) Oral three times a day with meals  ceFAZolin   IVPB 1000 milliGRAM(s) IV Intermittent every 24 hours  cloNIDine 0.1 milliGRAM(s) Oral every 12 hours  clopidogrel Tablet 75 milliGRAM(s) Oral daily  dextrose 5%. 1000 milliLiter(s) (100 mL/Hr) IV Continuous <Continuous>  dextrose 5%. 1000 milliLiter(s) (50 mL/Hr) IV Continuous <Continuous>  dextrose 50% Injectable 25 Gram(s) IV Push once  dextrose 50% Injectable 12.5 Gram(s) IV Push once  dextrose 50% Injectable 25 Gram(s) IV Push once  diltiazem    Tablet 60 milliGRAM(s) Oral every 8 hours  epoetin fawad-epbx (RETACRIT) Injectable 63398 Unit(s) IV Push <User Schedule>  glucagon  Injectable 1 milliGRAM(s) IntraMuscular once  glucagon  Injectable 1 milliGRAM(s) IntraMuscular once  heparin   Injectable 5000 Unit(s) SubCutaneous every 12 hours  imipramine 50 milliGRAM(s) Oral daily  insulin glargine Injectable (LANTUS) 10 Unit(s) SubCutaneous at bedtime  insulin lispro (ADMELOG) corrective regimen sliding scale   SubCutaneous three times a day before meals  insulin lispro (ADMELOG) corrective regimen sliding scale   SubCutaneous at bedtime  lactobacillus acidophilus 1 Tablet(s) Oral two times a day  metoprolol tartrate 100 milliGRAM(s) Oral every 12 hours  multivitamin 1 Tablet(s) Oral daily  pantoprazole    Tablet 40 milliGRAM(s) Oral before breakfast  senna 2 Tablet(s) Oral at bedtime    MEDICATIONS  (PRN):  acetaminophen     Tablet .. 650 milliGRAM(s) Oral every 6 hours PRN Temp greater or equal to 38C (100.4F), Mild Pain (1 - 3)  aluminum hydroxide/magnesium hydroxide/simethicone Suspension 30 milliLiter(s) Oral every 4 hours PRN Dyspepsia  dextrose Oral Gel 15 Gram(s) Oral once PRN Blood Glucose LESS THAN 70 milliGRAM(s)/deciliter  melatonin 3 milliGRAM(s) Oral at bedtime PRN Insomnia  ondansetron Injectable 4 milliGRAM(s) IV Push every 8 hours PRN Nausea and/or Vomiting  traMADol 50 milliGRAM(s) Oral three times a day PRN Moderate Pain (4 - 6)         Vitals log        ICU Vital Signs Last 24 Hrs  T(C): 36.8 (26 Apr 2022 05:00), Max: 36.8 (26 Apr 2022 05:00)  T(F): 98.2 (26 Apr 2022 05:00), Max: 98.2 (26 Apr 2022 05:00)  HR: 66 (26 Apr 2022 05:00) (61 - 85)  BP: 152/70 (26 Apr 2022 05:00) (118/80 - 152/70)  BP(mean): --  ABP: --  ABP(mean): --  RR: 17 (26 Apr 2022 05:00) (17 - 18)  SpO2: 92% (26 Apr 2022 05:00) (92% - 93%)           Input and Output:  I&O's Detail    24 Apr 2022 07:01  -  25 Apr 2022 07:00  --------------------------------------------------------  IN:  Total IN: 0 mL    OUT:    Stool (mL): 0 mL  Total OUT: 0 mL    Total NET: 0 mL          Lab Data                        9.5    11.02 )-----------( 437      ( 25 Apr 2022 09:08 )             29.8     04-25    132<L>  |  93<L>  |  65<H>  ----------------------------<  262<H>  5.2   |  29  |  6.50<H>    Ca    9.8      25 Apr 2022 09:08              Review of Systems	      Objective     Physical Examination    heart s1s2  lung dec BS  abd soft      Pertinent Lab findings & Imaging      Dexter:  NO   Adequate UO     I&O's Detail    24 Apr 2022 07:01  -  25 Apr 2022 07:00  --------------------------------------------------------  IN:  Total IN: 0 mL    OUT:    Stool (mL): 0 mL  Total OUT: 0 mL    Total NET: 0 mL               Discussed with:     Cultures:	        Radiology

## 2022-04-27 LAB
ANION GAP SERPL CALC-SCNC: 8 MMOL/L — SIGNIFICANT CHANGE UP (ref 5–17)
BUN SERPL-MCNC: 36 MG/DL — HIGH (ref 7–23)
CALCIUM SERPL-MCNC: 9.5 MG/DL — SIGNIFICANT CHANGE UP (ref 8.5–10.1)
CHLORIDE SERPL-SCNC: 98 MMOL/L — SIGNIFICANT CHANGE UP (ref 96–108)
CO2 SERPL-SCNC: 27 MMOL/L — SIGNIFICANT CHANGE UP (ref 22–31)
CREAT SERPL-MCNC: 4.4 MG/DL — HIGH (ref 0.5–1.3)
CULTURE RESULTS: SIGNIFICANT CHANGE UP
CULTURE RESULTS: SIGNIFICANT CHANGE UP
EGFR: 10 ML/MIN/1.73M2 — LOW
GLUCOSE SERPL-MCNC: 181 MG/DL — HIGH (ref 70–99)
HCT VFR BLD CALC: 30.1 % — LOW (ref 34.5–45)
HGB BLD-MCNC: 9.4 G/DL — LOW (ref 11.5–15.5)
MCHC RBC-ENTMCNC: 31.2 GM/DL — LOW (ref 32–36)
MCHC RBC-ENTMCNC: 31.6 PG — SIGNIFICANT CHANGE UP (ref 27–34)
MCV RBC AUTO: 101.3 FL — HIGH (ref 80–100)
NRBC # BLD: 1 /100 WBCS — HIGH (ref 0–0)
PLATELET # BLD AUTO: 340 K/UL — SIGNIFICANT CHANGE UP (ref 150–400)
POTASSIUM SERPL-MCNC: 5 MMOL/L — SIGNIFICANT CHANGE UP (ref 3.5–5.3)
POTASSIUM SERPL-SCNC: 5 MMOL/L — SIGNIFICANT CHANGE UP (ref 3.5–5.3)
RBC # BLD: 2.97 M/UL — LOW (ref 3.8–5.2)
RBC # FLD: 18.7 % — HIGH (ref 10.3–14.5)
SODIUM SERPL-SCNC: 133 MMOL/L — LOW (ref 135–145)
SPECIMEN SOURCE: SIGNIFICANT CHANGE UP
SPECIMEN SOURCE: SIGNIFICANT CHANGE UP
WBC # BLD: 14.62 K/UL — HIGH (ref 3.8–10.5)
WBC # FLD AUTO: 14.62 K/UL — HIGH (ref 3.8–10.5)

## 2022-04-27 PROCEDURE — 99221 1ST HOSP IP/OBS SF/LOW 40: CPT

## 2022-04-27 RX ADMIN — TRAMADOL HYDROCHLORIDE 50 MILLIGRAM(S): 50 TABLET ORAL at 03:11

## 2022-04-27 RX ADMIN — Medication 2: at 17:11

## 2022-04-27 RX ADMIN — Medication 667 MILLIGRAM(S): at 17:12

## 2022-04-27 RX ADMIN — Medication 4: at 12:04

## 2022-04-27 RX ADMIN — Medication 0.1 MILLIGRAM(S): at 05:45

## 2022-04-27 RX ADMIN — CLOPIDOGREL BISULFATE 75 MILLIGRAM(S): 75 TABLET, FILM COATED ORAL at 11:35

## 2022-04-27 RX ADMIN — Medication 0.1 MILLIGRAM(S): at 17:12

## 2022-04-27 RX ADMIN — Medication 100 MILLIGRAM(S): at 19:01

## 2022-04-27 RX ADMIN — HEPARIN SODIUM 5000 UNIT(S): 5000 INJECTION INTRAVENOUS; SUBCUTANEOUS at 19:47

## 2022-04-27 RX ADMIN — Medication 4: at 07:58

## 2022-04-27 RX ADMIN — Medication 667 MILLIGRAM(S): at 07:58

## 2022-04-27 RX ADMIN — Medication 3 MILLIGRAM(S): at 21:02

## 2022-04-27 RX ADMIN — PANTOPRAZOLE SODIUM 40 MILLIGRAM(S): 20 TABLET, DELAYED RELEASE ORAL at 05:45

## 2022-04-27 RX ADMIN — Medication 60 MILLIGRAM(S): at 13:06

## 2022-04-27 RX ADMIN — HEPARIN SODIUM 5000 UNIT(S): 5000 INJECTION INTRAVENOUS; SUBCUTANEOUS at 07:57

## 2022-04-27 RX ADMIN — SENNA PLUS 2 TABLET(S): 8.6 TABLET ORAL at 21:01

## 2022-04-27 RX ADMIN — Medication 1 TABLET(S): at 11:35

## 2022-04-27 RX ADMIN — Medication 60 MILLIGRAM(S): at 05:44

## 2022-04-27 RX ADMIN — ATORVASTATIN CALCIUM 40 MILLIGRAM(S): 80 TABLET, FILM COATED ORAL at 21:02

## 2022-04-27 RX ADMIN — Medication 667 MILLIGRAM(S): at 11:35

## 2022-04-27 RX ADMIN — Medication 100 MILLIGRAM(S): at 05:44

## 2022-04-27 RX ADMIN — Medication 100 MILLIGRAM(S): at 17:12

## 2022-04-27 RX ADMIN — INSULIN GLARGINE 10 UNIT(S): 100 INJECTION, SOLUTION SUBCUTANEOUS at 21:02

## 2022-04-27 RX ADMIN — Medication 1 TABLET(S): at 05:44

## 2022-04-27 RX ADMIN — TRAMADOL HYDROCHLORIDE 50 MILLIGRAM(S): 50 TABLET ORAL at 04:00

## 2022-04-27 RX ADMIN — Medication 1 TABLET(S): at 17:12

## 2022-04-27 RX ADMIN — Medication 1 APPLICATION(S): at 11:35

## 2022-04-27 RX ADMIN — Medication 50 MILLIGRAM(S): at 11:36

## 2022-04-27 RX ADMIN — Medication 60 MILLIGRAM(S): at 21:02

## 2022-04-27 RX ADMIN — Medication 81 MILLIGRAM(S): at 11:35

## 2022-04-27 NOTE — PROGRESS NOTE ADULT - SUBJECTIVE AND OBJECTIVE BOX
Patient is a 71y Female whom presented to the hospital with esrd on hd    PAST MEDICAL & SURGICAL HISTORY:  Diabetes mellitus II    HTN (hypertension)    h/o Anxiety attack    Depression    h/o Myocardial infarct 2007    CAD (coronary artery disease)    h/o Hepatitis A 1969  currently resolved, no symptoms    PAD (peripheral artery disease)    Murmur, cardiac    h/o Smoking  quitted 3/2012    CRF (chronic renal failure), unspecified stage    Dialysis patient    Anemia secondary to renal failure    HTN (hypertension)    coronary stent 2007    s/p Ovarian cyst removal    s/p surgical removal of benign Skin lesion epigastric area        MEDICATIONS  (STANDING):  aspirin enteric coated 81 milliGRAM(s) Oral daily  atorvastatin 40 milliGRAM(s) Oral at bedtime  calcium acetate 667 milliGRAM(s) Oral three times a day with meals  ceFAZolin   IVPB 1000 milliGRAM(s) IV Intermittent every 24 hours  cloNIDine 0.1 milliGRAM(s) Oral every 12 hours  clopidogrel Tablet 75 milliGRAM(s) Oral daily  dextrose 5%. 1000 milliLiter(s) (100 mL/Hr) IV Continuous <Continuous>  dextrose 5%. 1000 milliLiter(s) (50 mL/Hr) IV Continuous <Continuous>  dextrose 50% Injectable 25 Gram(s) IV Push once  dextrose 50% Injectable 12.5 Gram(s) IV Push once  dextrose 50% Injectable 25 Gram(s) IV Push once  diltiazem    Tablet 60 milliGRAM(s) Oral every 8 hours  glucagon  Injectable 1 milliGRAM(s) IntraMuscular once  heparin   Injectable 5000 Unit(s) SubCutaneous every 12 hours  imipramine 50 milliGRAM(s) Oral daily  insulin lispro (ADMELOG) corrective regimen sliding scale   SubCutaneous three times a day before meals  insulin lispro (ADMELOG) corrective regimen sliding scale   SubCutaneous at bedtime  lactobacillus acidophilus 1 Tablet(s) Oral two times a day  metoprolol tartrate 100 milliGRAM(s) Oral every 12 hours  multivitamin 1 Tablet(s) Oral daily  pantoprazole    Tablet 40 milliGRAM(s) Oral before breakfast  senna 2 Tablet(s) Oral at bedtime      Allergies    latex (Unknown)  No Known Drug Allergies    Intolerances        SOCIAL HISTORY:  Denies ETOh,Smoking,     FAMILY HISTORY:      REVIEW OF SYSTEMS:    CONSTITUTIONAL: No weakness, fevers or chills  RESPIRATORY: No cough, wheezing, hemoptysis; No shortness of breath  CARDIOVASCULAR: No chest pain or palpitations  GASTROINTESTINAL: No abdominal or epigastric pain. No nausea, vomiting,                                                          9.4    14.62 )-----------( 340      ( 27 Apr 2022 08:51 )             30.1       CBC Full  -  ( 27 Apr 2022 08:51 )  WBC Count : 14.62 K/uL  RBC Count : 2.97 M/uL  Hemoglobin : 9.4 g/dL  Hematocrit : 30.1 %  Platelet Count - Automated : 340 K/uL  Mean Cell Volume : 101.3 fl  Mean Cell Hemoglobin : 31.6 pg  Mean Cell Hemoglobin Concentration : 31.2 gm/dL  Auto Neutrophil # : x  Auto Lymphocyte # : x  Auto Monocyte # : x  Auto Eosinophil # : x  Auto Basophil # : x  Auto Neutrophil % : x  Auto Lymphocyte % : x  Auto Monocyte % : x  Auto Eosinophil % : x  Auto Basophil % : x      04-27    133<L>  |  98  |  36<H>  ----------------------------<  181<H>  5.0   |  27  |  4.40<H>    Ca    9.5      27 Apr 2022 08:51        CAPILLARY BLOOD GLUCOSE      POCT Blood Glucose.: 223 mg/dL (27 Apr 2022 11:55)  POCT Blood Glucose.: 214 mg/dL (27 Apr 2022 07:50)  POCT Blood Glucose.: 298 mg/dL (26 Apr 2022 21:36)      Vital Signs Last 24 Hrs  T(C): 36.7 (27 Apr 2022 13:03), Max: 37.1 (27 Apr 2022 05:00)  T(F): 98 (27 Apr 2022 13:03), Max: 98.8 (27 Apr 2022 05:00)  HR: 64 (27 Apr 2022 13:03) (64 - 77)  BP: 156/67 (27 Apr 2022 13:03) (136/54 - 167/71)  BP(mean): --  RR: 17 (27 Apr 2022 13:03) (17 - 18)  SpO2: 93% (27 Apr 2022 13:03) (92% - 93%)              PHYSICAL EXAM:    Constitutional: NAD  HEENT: conjunctive   clear   Neck:  No JVD  Respiratory: CTAB  Cardiovascular: S1 and S2  Gastrointestinal: BS+, soft, NT/ND  Extremities: No peripheral edema  Neurological: A/O x 3, no focal deficits

## 2022-04-27 NOTE — BH CONSULTATION LIAISON ASSESSMENT NOTE - HPI (INCLUDE ILLNESS QUALITY, SEVERITY, DURATION, TIMING, CONTEXT, MODIFYING FACTORS, ASSOCIATED SIGNS AND SYMPTOMS)
Patient seen, evaluated and chart reviewed. Patient is a 72 y/o MWF, with history of dementia, no prior psychiatric hospitalizations, Patient came to ED because she  fell today on her way to dialysis, pt states that she is falling everyday and always feels weak. pt poor historian, missed dialysis today, will need admission for placement. Recent admission 04/05/22 -72yo female bib ems with sob, pt states she has missed the last 2 rounds of dialysis and is due today, and has been having worsening sob, no cough, fever, chills, no chest pain no other complaints .Found to have hyperkalemia Diagnosed with foot infection MRI showed evidence of acute osteomyelitis of L medial malleolus and distal aspect of R 1st toe ,poa . -ID recommended  cefazolin 2g-2g-3g with HD x 4 weeks .Patient was discharged home with home care and iv abx at HD center Palliative care consult requested ,to discuss advance directives and complete MOLST. Patient at this time has limited ability to understand her situation.

## 2022-04-27 NOTE — BH CONSULTATION LIAISON ASSESSMENT NOTE - NSBHCHARTREVIEWVS_PSY_A_CORE FT
Vital Signs Last 24 Hrs  T(C): 36.7 (27 Apr 2022 13:03), Max: 37.1 (27 Apr 2022 05:00)  T(F): 98 (27 Apr 2022 13:03), Max: 98.8 (27 Apr 2022 05:00)  HR: 64 (27 Apr 2022 13:03) (58 - 77)  BP: 156/67 (27 Apr 2022 13:03) (136/54 - 170/70)  BP(mean): --  RR: 17 (27 Apr 2022 13:03) (16 - 18)  SpO2: 93% (27 Apr 2022 13:03) (92% - 96%)

## 2022-04-27 NOTE — BH CONSULTATION LIAISON ASSESSMENT NOTE - NSBHCHARTREVIEWLAB_PSY_A_CORE FT
9.4    14.62 )-----------( 340      ( 27 Apr 2022 08:51 )             30.1   04-27    133<L>  |  98  |  36<H>  ----------------------------<  181<H>  5.0   |  27  |  4.40<H>    Ca    9.5      27 Apr 2022 08:51

## 2022-04-27 NOTE — PROGRESS NOTE ADULT - SUBJECTIVE AND OBJECTIVE BOX
Neurology follow up note    SHILO [id number]      Interval History:    Patient feels ok no new complaints.    Allergies    latex (Unknown)  No Known Drug Allergies    Intolerances        MEDICATIONS    acetaminophen     Tablet .. 650 milliGRAM(s) Oral every 6 hours PRN  aluminum hydroxide/magnesium hydroxide/simethicone Suspension 30 milliLiter(s) Oral every 4 hours PRN  aspirin enteric coated 81 milliGRAM(s) Oral daily  atorvastatin 40 milliGRAM(s) Oral at bedtime  calcium acetate 667 milliGRAM(s) Oral three times a day with meals  ceFAZolin   IVPB 1000 milliGRAM(s) IV Intermittent every 24 hours  cloNIDine 0.1 milliGRAM(s) Oral every 12 hours  clopidogrel Tablet 75 milliGRAM(s) Oral daily  dextrose 5%. 1000 milliLiter(s) IV Continuous <Continuous>  dextrose 5%. 1000 milliLiter(s) IV Continuous <Continuous>  dextrose 50% Injectable 25 Gram(s) IV Push once  dextrose 50% Injectable 12.5 Gram(s) IV Push once  dextrose 50% Injectable 25 Gram(s) IV Push once  dextrose Oral Gel 15 Gram(s) Oral once PRN  diltiazem    Tablet 60 milliGRAM(s) Oral every 8 hours  epoetin fawad-epbx (RETACRIT) Injectable 39270 Unit(s) IV Push <User Schedule>  glucagon  Injectable 1 milliGRAM(s) IntraMuscular once  glucagon  Injectable 1 milliGRAM(s) IntraMuscular once  heparin   Injectable 5000 Unit(s) SubCutaneous every 12 hours  imipramine 50 milliGRAM(s) Oral daily  insulin glargine Injectable (LANTUS) 10 Unit(s) SubCutaneous at bedtime  insulin lispro (ADMELOG) corrective regimen sliding scale   SubCutaneous three times a day before meals  insulin lispro (ADMELOG) corrective regimen sliding scale   SubCutaneous at bedtime  lactobacillus acidophilus 1 Tablet(s) Oral two times a day  melatonin 3 milliGRAM(s) Oral at bedtime PRN  metoprolol tartrate 100 milliGRAM(s) Oral every 12 hours  multivitamin 1 Tablet(s) Oral daily  ondansetron Injectable 4 milliGRAM(s) IV Push every 8 hours PRN  pantoprazole    Tablet 40 milliGRAM(s) Oral before breakfast  povidone iodine 10% Solution 1 Application(s) Topical daily  senna 2 Tablet(s) Oral at bedtime  traMADol 50 milliGRAM(s) Oral three times a day PRN              Vital Signs Last 24 Hrs  T(C): 37.1 (27 Apr 2022 05:00), Max: 37.1 (27 Apr 2022 05:00)  T(F): 98.8 (27 Apr 2022 05:00), Max: 98.8 (27 Apr 2022 05:00)  HR: 67 (27 Apr 2022 05:00) (52 - 77)  BP: 146/66 (27 Apr 2022 05:00) (115/60 - 170/70)  BP(mean): --  RR: 18 (27 Apr 2022 05:00) (16 - 18)  SpO2: 92% (27 Apr 2022 05:00) (92% - 96%)    REVIEW OF SYSTEMS:  Constitutional:  No fever, chills, or night sweats.  Head:  No headaches.  Eyes:  No double vision or blurry vision.  Ears: No ringing in the ears.  Neck:  No neck pain.  Cardiovascular:  No chest pain.  Respiratory:  No shortness of breath.  Abdomen:  No nausea, vomiting, or abdominal pain.  Extremities/Neurological:  Positive numbness and tingling in bilateral feet.  Spine:  No history of back pain.    PHYSICAL EXAMINATION:    HEENT:  Head:  Normocephalic, atraumatic.  Eyes:  No scleral icterus.  Ears:  Hearing bilaterally intact.  NECK:  Supple.  RESPIRATORY:  Good air entry bilaterally.  CARDIOVASCULAR:  S1 and S2 heard.  ABDOMEN:  Soft, nontender.  EXTREMITIES:  No clubbing or cyanosis was noted.  Positive sign of scars and blood on the bottom of her feet from scrapes.      NEUROLOGIC:  The patient is awake and alert.  Extraocular movements were intact.  Upon conversing with the patient, at times, she may be slightly forgetful, but as per my conversation with the spouse, he has noticed that lately as well.  Speech was fluent.  Smile was symmetric.  Full visual fields.  Motor:  Bilateral upper 5/5.  Bilateral lower 4/5.  The patient had markedly impaired proprioception in bilateral lower extremities.  Knee jerks bilaterally were trace, suspect this could be secondary to diabetes and possibly peripheral neuropathy.              LABS:  CBC Full  -  ( 26 Apr 2022 09:27 )  WBC Count : 12.32 K/uL  RBC Count : 3.04 M/uL  Hemoglobin : 9.5 g/dL  Hematocrit : 29.7 %  Platelet Count - Automated : 426 K/uL  Mean Cell Volume : 97.7 fl  Mean Cell Hemoglobin : 31.3 pg  Mean Cell Hemoglobin Concentration : 32.0 gm/dL  Auto Neutrophil # : x  Auto Lymphocyte # : x  Auto Monocyte # : x  Auto Eosinophil # : x  Auto Basophil # : x  Auto Neutrophil % : x  Auto Lymphocyte % : x  Auto Monocyte % : x  Auto Eosinophil % : x  Auto Basophil % : x      04-26    130<L>  |  92<L>  |  88<H>  ----------------------------<  235<H>  5.3   |  24  |  7.60<H>    Ca    10.3<H>      26 Apr 2022 09:27      Hemoglobin A1C:       Vitamin B12         RADIOLOGY      ANALYSIS AND PLAN:  This is a 71-year-old with a history of difficulty ambulating, ataxia, and falls.  For ataxia and falls, suspect this is multifactorial secondary to diabetes and end-stage kidney disease and deconditioning, age-related changes leading to peripheral neuropathy type of component as well.  Suspect less likely this is a primary central nervous system event secondary to as per the patient and her spouse both legs become weak.  This points toward more a peripheral type of etiology.  Would recommend physical therapy evaluation.  Fall precaution.  For history of diabetes, strict control of blood sugars.  For history of hypertension, monitor systolic blood pressure.  For hyperlipidemia, continue the patient on statin.  The patient appears to be on multiple blood thinning medications, would recommend risks versus benefits must be weighed in regards to the patient's history of falls.  For possibly mild cognitive impairment  ESRD nephology follow up   do to high risk of fall medical team noted AC jade on antiplatelets     Spoke with spouse, Geoffrey, at [phone] 4/24 today and the patient in great detail.  Patient now stating she does not want to go to rehab she wants to she how she does with physical therapy here     Greater than 31 minutes of time was spent with the patient, plan of care, reviewing data, with greater than 50% of the visit was spent counseling and/or coordinating care with multidisciplinary healthcare team   Neurology follow up note    SHILO [id number]      Interval History:    Patient feels ok no new complaints.    Allergies    latex (Unknown)  No Known Drug Allergies    Intolerances        MEDICATIONS    acetaminophen     Tablet .. 650 milliGRAM(s) Oral every 6 hours PRN  aluminum hydroxide/magnesium hydroxide/simethicone Suspension 30 milliLiter(s) Oral every 4 hours PRN  aspirin enteric coated 81 milliGRAM(s) Oral daily  atorvastatin 40 milliGRAM(s) Oral at bedtime  calcium acetate 667 milliGRAM(s) Oral three times a day with meals  ceFAZolin   IVPB 1000 milliGRAM(s) IV Intermittent every 24 hours  cloNIDine 0.1 milliGRAM(s) Oral every 12 hours  clopidogrel Tablet 75 milliGRAM(s) Oral daily  dextrose 5%. 1000 milliLiter(s) IV Continuous <Continuous>  dextrose 5%. 1000 milliLiter(s) IV Continuous <Continuous>  dextrose 50% Injectable 25 Gram(s) IV Push once  dextrose 50% Injectable 12.5 Gram(s) IV Push once  dextrose 50% Injectable 25 Gram(s) IV Push once  dextrose Oral Gel 15 Gram(s) Oral once PRN  diltiazem    Tablet 60 milliGRAM(s) Oral every 8 hours  epoetin fawad-epbx (RETACRIT) Injectable 99667 Unit(s) IV Push <User Schedule>  glucagon  Injectable 1 milliGRAM(s) IntraMuscular once  glucagon  Injectable 1 milliGRAM(s) IntraMuscular once  heparin   Injectable 5000 Unit(s) SubCutaneous every 12 hours  imipramine 50 milliGRAM(s) Oral daily  insulin glargine Injectable (LANTUS) 10 Unit(s) SubCutaneous at bedtime  insulin lispro (ADMELOG) corrective regimen sliding scale   SubCutaneous three times a day before meals  insulin lispro (ADMELOG) corrective regimen sliding scale   SubCutaneous at bedtime  lactobacillus acidophilus 1 Tablet(s) Oral two times a day  melatonin 3 milliGRAM(s) Oral at bedtime PRN  metoprolol tartrate 100 milliGRAM(s) Oral every 12 hours  multivitamin 1 Tablet(s) Oral daily  ondansetron Injectable 4 milliGRAM(s) IV Push every 8 hours PRN  pantoprazole    Tablet 40 milliGRAM(s) Oral before breakfast  povidone iodine 10% Solution 1 Application(s) Topical daily  senna 2 Tablet(s) Oral at bedtime  traMADol 50 milliGRAM(s) Oral three times a day PRN              Vital Signs Last 24 Hrs  T(C): 37.1 (27 Apr 2022 05:00), Max: 37.1 (27 Apr 2022 05:00)  T(F): 98.8 (27 Apr 2022 05:00), Max: 98.8 (27 Apr 2022 05:00)  HR: 67 (27 Apr 2022 05:00) (52 - 77)  BP: 146/66 (27 Apr 2022 05:00) (115/60 - 170/70)  BP(mean): --  RR: 18 (27 Apr 2022 05:00) (16 - 18)  SpO2: 92% (27 Apr 2022 05:00) (92% - 96%)    REVIEW OF SYSTEMS:  Constitutional:  No fever, chills, or night sweats.  Head:  No headaches.  Eyes:  No double vision or blurry vision.  Ears: No ringing in the ears.  Neck:  No neck pain.  Cardiovascular:  No chest pain.  Respiratory:  No shortness of breath.  Abdomen:  No nausea, vomiting, or abdominal pain.  Extremities/Neurological:  Positive numbness and tingling in bilateral feet.  Spine:  No history of back pain.    PHYSICAL EXAMINATION:    HEENT:  Head:  Normocephalic, atraumatic.  Eyes:  No scleral icterus.  Ears:  Hearing bilaterally intact.  NECK:  Supple.  RESPIRATORY:  Good air entry bilaterally.  CARDIOVASCULAR:  S1 and S2 heard.  ABDOMEN:  Soft, nontender.  EXTREMITIES:  No clubbing or cyanosis was noted.  Positive sign of scars and blood on the bottom of her feet from scrapes.      NEUROLOGIC:  The patient is awake and alert.  Extraocular movements were intact.  Upon conversing with the patient, at times, she may be slightly forgetful, but as per my conversation with the spouse, he has noticed that lately as well.  Speech was fluent.  Smile was symmetric.  Full visual fields.  Motor:  Bilateral upper 5/5.  Bilateral lower 4/5.  The patient had markedly impaired proprioception in bilateral lower extremities.  Knee jerks bilaterally were trace, suspect this could be secondary to diabetes and possibly peripheral neuropathy.              LABS:  CBC Full  -  ( 26 Apr 2022 09:27 )  WBC Count : 12.32 K/uL  RBC Count : 3.04 M/uL  Hemoglobin : 9.5 g/dL  Hematocrit : 29.7 %  Platelet Count - Automated : 426 K/uL  Mean Cell Volume : 97.7 fl  Mean Cell Hemoglobin : 31.3 pg  Mean Cell Hemoglobin Concentration : 32.0 gm/dL  Auto Neutrophil # : x  Auto Lymphocyte # : x  Auto Monocyte # : x  Auto Eosinophil # : x  Auto Basophil # : x  Auto Neutrophil % : x  Auto Lymphocyte % : x  Auto Monocyte % : x  Auto Eosinophil % : x  Auto Basophil % : x      04-26    130<L>  |  92<L>  |  88<H>  ----------------------------<  235<H>  5.3   |  24  |  7.60<H>    Ca    10.3<H>      26 Apr 2022 09:27      Hemoglobin A1C:       Vitamin B12         RADIOLOGY      ANALYSIS AND PLAN:  This is a 71-year-old with a history of difficulty ambulating, ataxia, and falls.  For ataxia and falls, suspect this is multifactorial secondary to diabetes and end-stage kidney disease and deconditioning, age-related changes leading to peripheral neuropathy type of component as well.  Suspect less likely this is a primary central nervous system event secondary to as per the patient and her spouse both legs become weak.  This points toward more a peripheral type of etiology.  Would recommend physical therapy evaluation.  For history of diabetes, strict control of blood sugars.  For history of hypertension, monitor systolic blood pressure.  For hyperlipidemia, continue the patient on statin.  The patient appears to be on multiple blood thinning medications, would recommend risks versus benefits must be weighed in regards to the patient's history of falls.  For possibly mild cognitive impairment  ESRD nephology follow up   do to high risk of fall medical team noted AC jade on antiplatelets     Spoke with spouse, Geoffrey, at [phone] 4/24 today and the patient in great detail.  Patient now stating she does not want to go to rehab she wants to she how she does with physical therapy here     Greater than 24 minutes of time was spent with the patient, plan of care, reviewing data, with greater than 50% of the visit was spent counseling and/or coordinating care with multidisciplinary healthcare team

## 2022-04-27 NOTE — PROGRESS NOTE ADULT - SUBJECTIVE AND OBJECTIVE BOX
PROGRESS NOTE  Patient is a 71y old  Female who presents with a chief complaint of falls ,weakness (27 Apr 2022 08:34)    Chart and available morning labs /imaging are reviewed electronically , urgent issues addressed . More information  is being added upon completion of rounds , when more information is collected and management discussed with consultants , medical staff and social service/case management on the floor   OVERNIGHT  No new issues reported by medical staff . All above noted   Patient is resting in a bed comfortably  .No distress noted   HPI:  Patient came to ED because she  fell today on her way to dialysis, pt states that she is falling everyday and always feels weak. pt poor historian, missed dialysis today, will need admission for placement. Recent admission 04/05/22 -72yo female bib ems with sob, pt states she has missed the last 2 rounds of dialysis and is due today, and has been having worsening sob, no cough, fever, chills, no chest pain no other complaints .Found to have hyperkalemia Diagnosed with foot infection MRI showed evidence of acute osteomyelitis of L medial malleolus and distal aspect of R 1st toe ,poa . -ID recommended  cefazolin 2g-2g-3g with HD x 4 weeks .Patient was discharged home with home care and iv abx at HD center Palliative care consult requested ,to discuss advance directives and complete MOLST  (22 Apr 2022 16:22)    PAST MEDICAL & SURGICAL HISTORY:  Diabetes mellitus II    HTN (hypertension)    h/o Anxiety attack    Depression    h/o Myocardial infarct 2007    CAD (coronary artery disease)    h/o Hepatitis A 1969  currently resolved, no symptoms    PAD (peripheral artery disease)    Murmur, cardiac    h/o Smoking  quitted 3/2012    CRF (chronic renal failure), unspecified stage    Dialysis patient    HTN (hypertension)    Anemia secondary to renal failure    coronary stent 2007    s/p Ovarian cyst removal    s/p surgical removal of benign Skin lesion epigastric area        MEDICATIONS  (STANDING):  aspirin enteric coated 81 milliGRAM(s) Oral daily  atorvastatin 40 milliGRAM(s) Oral at bedtime  calcium acetate 667 milliGRAM(s) Oral three times a day with meals  ceFAZolin   IVPB 1000 milliGRAM(s) IV Intermittent every 24 hours  cloNIDine 0.1 milliGRAM(s) Oral every 12 hours  clopidogrel Tablet 75 milliGRAM(s) Oral daily  dextrose 5%. 1000 milliLiter(s) (100 mL/Hr) IV Continuous <Continuous>  dextrose 5%. 1000 milliLiter(s) (50 mL/Hr) IV Continuous <Continuous>  dextrose 50% Injectable 25 Gram(s) IV Push once  dextrose 50% Injectable 12.5 Gram(s) IV Push once  dextrose 50% Injectable 25 Gram(s) IV Push once  diltiazem    Tablet 60 milliGRAM(s) Oral every 8 hours  epoetin fawad-epbx (RETACRIT) Injectable 17347 Unit(s) IV Push <User Schedule>  glucagon  Injectable 1 milliGRAM(s) IntraMuscular once  glucagon  Injectable 1 milliGRAM(s) IntraMuscular once  heparin   Injectable 5000 Unit(s) SubCutaneous every 12 hours  imipramine 50 milliGRAM(s) Oral daily  insulin glargine Injectable (LANTUS) 10 Unit(s) SubCutaneous at bedtime  insulin lispro (ADMELOG) corrective regimen sliding scale   SubCutaneous three times a day before meals  insulin lispro (ADMELOG) corrective regimen sliding scale   SubCutaneous at bedtime  lactobacillus acidophilus 1 Tablet(s) Oral two times a day  metoprolol tartrate 100 milliGRAM(s) Oral every 12 hours  multivitamin 1 Tablet(s) Oral daily  pantoprazole    Tablet 40 milliGRAM(s) Oral before breakfast  povidone iodine 10% Solution 1 Application(s) Topical daily  senna 2 Tablet(s) Oral at bedtime    MEDICATIONS  (PRN):  acetaminophen     Tablet .. 650 milliGRAM(s) Oral every 6 hours PRN Temp greater or equal to 38C (100.4F), Mild Pain (1 - 3)  aluminum hydroxide/magnesium hydroxide/simethicone Suspension 30 milliLiter(s) Oral every 4 hours PRN Dyspepsia  dextrose Oral Gel 15 Gram(s) Oral once PRN Blood Glucose LESS THAN 70 milliGRAM(s)/deciliter  melatonin 3 milliGRAM(s) Oral at bedtime PRN Insomnia  ondansetron Injectable 4 milliGRAM(s) IV Push every 8 hours PRN Nausea and/or Vomiting  traMADol 50 milliGRAM(s) Oral three times a day PRN Moderate Pain (4 - 6)      OBJECTIVE    T(C): 36.7 (04-27-22 @ 13:03), Max: 37.1 (04-27-22 @ 05:00)  HR: 64 (04-27-22 @ 13:03) (64 - 77)  BP: 156/67 (04-27-22 @ 13:03) (136/54 - 167/71)  RR: 17 (04-27-22 @ 13:03) (17 - 18)  SpO2: 93% (04-27-22 @ 13:03) (92% - 93%)  Wt(kg): --  I&O's Summary    26 Apr 2022 07:01  -  27 Apr 2022 07:00  --------------------------------------------------------  IN: 0 mL / OUT: 1000 mL / NET: -1000 mL          REVIEW OF SYSTEMS:  CONSTITUTIONAL: No fever, weight loss, or fatigue  EYES: No eye pain, visual disturbances, or discharge  ENMT:   No sinus or throat pain  NECK: No pain or stiffness  RESPIRATORY: No cough, wheezing, chills or hemoptysis; No shortness of breath  CARDIOVASCULAR: No chest pain, palpitations, dizziness, or leg swelling  GASTROINTESTINAL: No abdominal pain. No nausea, vomiting; No diarrhea or constipation. No melena or hematochezia.  GENITOURINARY: No dysuria, frequency, hematuria, or incontinence  NEUROLOGICAL: No headaches, memory loss, loss of strength, numbness, or tremors  SKIN: No itching, burning, rashes, or lesions   MUSCULOSKELETAL: No joint pain or swelling; No muscle, back, or extremity pain    PHYSICAL EXAM:  Appearance: NAD. VS past 24 hrs -as above   HEENT:   Moist oral mucosa. Conjunctiva clear b/l.   Neck : supple  Respiratory: Lungs CTAB.  Gastrointestinal:  Soft, nontender. No rebound. No rigidity. BS present	  Cardiovascular: RRR ,S1S2 present  Neurologic: Non-focal. Moving all extremities.  Extremities: No edema. No erythema. No calf tenderness.  Skin: No rashes, No ecchymoses, No cyanosis.	  wounds ,skin lesions-See skin assesment flow sheet   LABS:                        9.4    14.62 )-----------( 340      ( 27 Apr 2022 08:51 )             30.1     04-27    133<L>  |  98  |  36<H>  ----------------------------<  181<H>  5.0   |  27  |  4.40<H>    Ca    9.5      27 Apr 2022 08:51      CAPILLARY BLOOD GLUCOSE      POCT Blood Glucose.: 223 mg/dL (27 Apr 2022 11:55)  POCT Blood Glucose.: 214 mg/dL (27 Apr 2022 07:50)  POCT Blood Glucose.: 298 mg/dL (26 Apr 2022 21:36)  POCT Blood Glucose.: 205 mg/dL (26 Apr 2022 16:37)          Culture - Blood (collected 22 Apr 2022 18:38)  Source: .Blood Blood-Peripheral  Preliminary Report (23 Apr 2022 19:02):    No growth to date.    Culture - Blood (collected 22 Apr 2022 18:38)  Source: .Blood Blood-Peripheral  Preliminary Report (23 Apr 2022 19:02):    No growth to date.      RADIOLOGY & ADDITIONAL TESTS:   reviewed elctronically  ASSESSMENT/PLAN: 	    25 minutes aggregate time was spent on this visit, 50% visit time spent in care co-ordination with other attendings and counselling patient .I have discussed care plan with patient / HCP/family member ,who expressed understanding of problems treatment and their effect and side effects, alternatives in details. I have asked if they have any questions and concerns and appropriately addressed them to best of my ability.

## 2022-04-27 NOTE — PROGRESS NOTE ADULT - SUBJECTIVE AND OBJECTIVE BOX
Patient is a 71y Female with a known history of :  Falls [W19.XXXA]    DM (diabetes mellitus) [E11.9]    Acute osteomyelitis [M86.10]    ESRD on dialysis [N18.6]    Anemia secondary to renal failure [N18.9]    Atrial fibrillation [I48.91]    PAD (peripheral artery disease) [I73.9]    Non-compliant patient [Z91.19]    Prophylactic measure [Z29.9]    Need for follow-up by  [Z78.9]    Gangrenous toe [I96]    Chronic osteomyelitis [M86.60]      HPI:  Patient came to ED because she  fell today on her way to dialysis, pt states that she is falling everyday and always feels weak. pt poor historian, missed dialysis today, will need admission for placement. Recent admission 04/05/22 -70yo female bib ems with sob, pt states she has missed the last 2 rounds of dialysis and is due today, and has been having worsening sob, no cough, fever, chills, no chest pain no other complaints .Found to have hyperkalemia Diagnosed with foot infection MRI showed evidence of acute osteomyelitis of L medial malleolus and distal aspect of R 1st toe ,poa . -ID recommended  cefazolin 2g-2g-3g with HD x 4 weeks .Patient was discharged home with home care and iv abx at HD center Palliative care consult requested ,to discuss advance directives and complete MOLST  (22 Apr 2022 16:22)      REVIEW OF SYSTEMS:    CONSTITUTIONAL: No fever, weight loss, or fatigue  EYES: No eye pain, visual disturbances, or discharge  ENMT:  No difficulty hearing, tinnitus, vertigo; No sinus or throat pain  NECK: No pain or stiffness  BREASTS: No pain, masses, or nipple discharge  RESPIRATORY: No cough, wheezing, chills or hemoptysis; No shortness of breath  CARDIOVASCULAR: No chest pain, palpitations, dizziness, or leg swelling  GASTROINTESTINAL: No abdominal or epigastric pain. No nausea, vomiting, or hematemesis; No diarrhea or constipation. No melena or hematochezia.  GENITOURINARY: No dysuria, frequency, hematuria, or incontinence  NEUROLOGICAL: No headaches, memory loss, loss of strength, numbness, or tremors  SKIN: No itching, burning, rashes, or lesions   LYMPH NODES: No enlarged glands  ENDOCRINE: No heat or cold intolerance; No hair loss  MUSCULOSKELETAL: No joint pain or swelling; No muscle, back, or extremity pain  PSYCHIATRIC: No depression, anxiety, mood swings, or difficulty sleeping  HEME/LYMPH: No easy bruising, or bleeding gums  ALLERGY AND IMMUNOLOGIC: No hives or eczema    MEDICATIONS  (STANDING):  aspirin enteric coated 81 milliGRAM(s) Oral daily  atorvastatin 40 milliGRAM(s) Oral at bedtime  calcium acetate 667 milliGRAM(s) Oral three times a day with meals  ceFAZolin   IVPB 1000 milliGRAM(s) IV Intermittent every 24 hours  cloNIDine 0.1 milliGRAM(s) Oral every 12 hours  clopidogrel Tablet 75 milliGRAM(s) Oral daily  dextrose 5%. 1000 milliLiter(s) (100 mL/Hr) IV Continuous <Continuous>  dextrose 5%. 1000 milliLiter(s) (50 mL/Hr) IV Continuous <Continuous>  dextrose 50% Injectable 25 Gram(s) IV Push once  dextrose 50% Injectable 12.5 Gram(s) IV Push once  dextrose 50% Injectable 25 Gram(s) IV Push once  diltiazem    Tablet 60 milliGRAM(s) Oral every 8 hours  epoetin fawad-epbx (RETACRIT) Injectable 08718 Unit(s) IV Push <User Schedule>  glucagon  Injectable 1 milliGRAM(s) IntraMuscular once  glucagon  Injectable 1 milliGRAM(s) IntraMuscular once  heparin   Injectable 5000 Unit(s) SubCutaneous every 12 hours  imipramine 50 milliGRAM(s) Oral daily  insulin glargine Injectable (LANTUS) 10 Unit(s) SubCutaneous at bedtime  insulin lispro (ADMELOG) corrective regimen sliding scale   SubCutaneous three times a day before meals  insulin lispro (ADMELOG) corrective regimen sliding scale   SubCutaneous at bedtime  lactobacillus acidophilus 1 Tablet(s) Oral two times a day  metoprolol tartrate 100 milliGRAM(s) Oral every 12 hours  multivitamin 1 Tablet(s) Oral daily  pantoprazole    Tablet 40 milliGRAM(s) Oral before breakfast  povidone iodine 10% Solution 1 Application(s) Topical daily  senna 2 Tablet(s) Oral at bedtime    MEDICATIONS  (PRN):  acetaminophen     Tablet .. 650 milliGRAM(s) Oral every 6 hours PRN Temp greater or equal to 38C (100.4F), Mild Pain (1 - 3)  aluminum hydroxide/magnesium hydroxide/simethicone Suspension 30 milliLiter(s) Oral every 4 hours PRN Dyspepsia  dextrose Oral Gel 15 Gram(s) Oral once PRN Blood Glucose LESS THAN 70 milliGRAM(s)/deciliter  melatonin 3 milliGRAM(s) Oral at bedtime PRN Insomnia  ondansetron Injectable 4 milliGRAM(s) IV Push every 8 hours PRN Nausea and/or Vomiting  traMADol 50 milliGRAM(s) Oral three times a day PRN Moderate Pain (4 - 6)      ALLERGIES: latex (Unknown)  No Known Drug Allergies      FAMILY HISTORY:      PHYSICAL EXAMINATION:  -----------------------------  T(C): 37.1 (04-27-22 @ 05:00), Max: 37.1 (04-27-22 @ 05:00)  HR: 67 (04-27-22 @ 05:00) (52 - 77)  BP: 146/66 (04-27-22 @ 05:00) (115/60 - 170/70)  RR: 18 (04-27-22 @ 05:00) (16 - 18)  SpO2: 92% (04-27-22 @ 05:00) (92% - 96%)  Wt(kg): --    04-26 @ 07:01  -  04-27 @ 07:00  --------------------------------------------------------  IN:  Total IN: 0 mL    OUT:    Other (mL): 1000 mL  Total OUT: 1000 mL    Total NET: -1000 mL            VITALS  T(C): 37.1 (04-27-22 @ 05:00), Max: 37.1 (04-27-22 @ 05:00)  HR: 67 (04-27-22 @ 05:00) (52 - 77)  BP: 146/66 (04-27-22 @ 05:00) (115/60 - 170/70)  RR: 18 (04-27-22 @ 05:00) (16 - 18)  SpO2: 92% (04-27-22 @ 05:00) (92% - 96%)    Constitutional: well developed, normal appearance, well groomed, well nourished, no deformities and no acute distress.   Eyes: the conjunctiva exhibited no abnormalities and the eyelids demonstrated no xanthelasmas.   HEENT: normal oral mucosa, no oral pallor and no oral cyanosis.   Neck: normal jugular venous A waves present, normal jugular venous V waves present and no jugular venous wilson A waves.   Pulmonary: no respiratory distress, normal respiratory rhythm and effort, no accessory muscle use and lungs were clear to auscultation bilaterally.   Cardiovascular: heart rate and rhythm were normal, normal S1 and S2 and no murmur, gallop, rub, heave or thrill are present.   Abdomen: soft, non-tender, no hepato-splenomegaly and no abdominal mass palpated.   Musculoskeletal: the gait could not be assessed..   Extremities: no clubbing of the fingernails, no localized cyanosis, no petechial hemorrhages and no ischemic changes.   Skin: normal skin color and pigmentation, no rash, no venous stasis, no skin lesions, no skin ulcer and no xanthoma was observed.   Psychiatric: oriented to person, place, and time, the affect was normal, the mood was normal and not feeling anxious.     LABS:   --------  04-26    130<L>  |  92<L>  |  88<H>  ----------------------------<  235<H>  5.3   |  24  |  7.60<H>    Ca    10.3<H>      26 Apr 2022 09:27                           9.5    12.32 )-----------( 426      ( 26 Apr 2022 09:27 )             29.7                 RADIOLOGY:  -----------------    ECG:     ECHO:

## 2022-04-27 NOTE — BH CONSULTATION LIAISON ASSESSMENT NOTE - CURRENT MEDICATION
MEDICATIONS  (STANDING):  aspirin enteric coated 81 milliGRAM(s) Oral daily  atorvastatin 40 milliGRAM(s) Oral at bedtime  calcium acetate 667 milliGRAM(s) Oral three times a day with meals  ceFAZolin   IVPB 1000 milliGRAM(s) IV Intermittent every 24 hours  cloNIDine 0.1 milliGRAM(s) Oral every 12 hours  clopidogrel Tablet 75 milliGRAM(s) Oral daily  dextrose 5%. 1000 milliLiter(s) (100 mL/Hr) IV Continuous <Continuous>  dextrose 5%. 1000 milliLiter(s) (50 mL/Hr) IV Continuous <Continuous>  dextrose 50% Injectable 25 Gram(s) IV Push once  dextrose 50% Injectable 12.5 Gram(s) IV Push once  dextrose 50% Injectable 25 Gram(s) IV Push once  diltiazem    Tablet 60 milliGRAM(s) Oral every 8 hours  epoetin fawad-epbx (RETACRIT) Injectable 15348 Unit(s) IV Push <User Schedule>  glucagon  Injectable 1 milliGRAM(s) IntraMuscular once  glucagon  Injectable 1 milliGRAM(s) IntraMuscular once  heparin   Injectable 5000 Unit(s) SubCutaneous every 12 hours  imipramine 50 milliGRAM(s) Oral daily  insulin glargine Injectable (LANTUS) 10 Unit(s) SubCutaneous at bedtime  insulin lispro (ADMELOG) corrective regimen sliding scale   SubCutaneous three times a day before meals  insulin lispro (ADMELOG) corrective regimen sliding scale   SubCutaneous at bedtime  lactobacillus acidophilus 1 Tablet(s) Oral two times a day  metoprolol tartrate 100 milliGRAM(s) Oral every 12 hours  multivitamin 1 Tablet(s) Oral daily  pantoprazole    Tablet 40 milliGRAM(s) Oral before breakfast  povidone iodine 10% Solution 1 Application(s) Topical daily  senna 2 Tablet(s) Oral at bedtime    MEDICATIONS  (PRN):  acetaminophen     Tablet .. 650 milliGRAM(s) Oral every 6 hours PRN Temp greater or equal to 38C (100.4F), Mild Pain (1 - 3)  aluminum hydroxide/magnesium hydroxide/simethicone Suspension 30 milliLiter(s) Oral every 4 hours PRN Dyspepsia  dextrose Oral Gel 15 Gram(s) Oral once PRN Blood Glucose LESS THAN 70 milliGRAM(s)/deciliter  melatonin 3 milliGRAM(s) Oral at bedtime PRN Insomnia  ondansetron Injectable 4 milliGRAM(s) IV Push every 8 hours PRN Nausea and/or Vomiting  traMADol 50 milliGRAM(s) Oral three times a day PRN Moderate Pain (4 - 6)

## 2022-04-28 LAB
ALBUMIN SERPL ELPH-MCNC: 2.6 G/DL — LOW (ref 3.3–5)
ALP SERPL-CCNC: 90 U/L — SIGNIFICANT CHANGE UP (ref 40–120)
ALT FLD-CCNC: <6 U/L — LOW (ref 12–78)
ANION GAP SERPL CALC-SCNC: 7 MMOL/L — SIGNIFICANT CHANGE UP (ref 5–17)
AST SERPL-CCNC: 24 U/L — SIGNIFICANT CHANGE UP (ref 15–37)
BASOPHILS # BLD AUTO: 0 K/UL — SIGNIFICANT CHANGE UP (ref 0–0.2)
BASOPHILS NFR BLD AUTO: 0 % — SIGNIFICANT CHANGE UP (ref 0–2)
BILIRUB DIRECT SERPL-MCNC: <0.1 MG/DL — SIGNIFICANT CHANGE UP (ref 0–0.3)
BILIRUB INDIRECT FLD-MCNC: >0.2 MG/DL — SIGNIFICANT CHANGE UP (ref 0.2–1)
BILIRUB SERPL-MCNC: 0.3 MG/DL — SIGNIFICANT CHANGE UP (ref 0.2–1.2)
BUN SERPL-MCNC: 54 MG/DL — HIGH (ref 7–23)
CALCIUM SERPL-MCNC: 9.7 MG/DL — SIGNIFICANT CHANGE UP (ref 8.5–10.1)
CHLORIDE SERPL-SCNC: 96 MMOL/L — SIGNIFICANT CHANGE UP (ref 96–108)
CO2 SERPL-SCNC: 29 MMOL/L — SIGNIFICANT CHANGE UP (ref 22–31)
CREAT SERPL-MCNC: 5.7 MG/DL — HIGH (ref 0.5–1.3)
EGFR: 7 ML/MIN/1.73M2 — LOW
EOSINOPHIL # BLD AUTO: 0.46 K/UL — SIGNIFICANT CHANGE UP (ref 0–0.5)
EOSINOPHIL NFR BLD AUTO: 4 % — SIGNIFICANT CHANGE UP (ref 0–6)
GLUCOSE SERPL-MCNC: 142 MG/DL — HIGH (ref 70–99)
HCT VFR BLD CALC: 30 % — LOW (ref 34.5–45)
HGB BLD-MCNC: 9.6 G/DL — LOW (ref 11.5–15.5)
LYMPHOCYTES # BLD AUTO: 1.03 K/UL — SIGNIFICANT CHANGE UP (ref 1–3.3)
LYMPHOCYTES # BLD AUTO: 9 % — LOW (ref 13–44)
MCHC RBC-ENTMCNC: 32 GM/DL — SIGNIFICANT CHANGE UP (ref 32–36)
MCHC RBC-ENTMCNC: 32 PG — SIGNIFICANT CHANGE UP (ref 27–34)
MCV RBC AUTO: 100 FL — SIGNIFICANT CHANGE UP (ref 80–100)
MONOCYTES # BLD AUTO: 1.72 K/UL — HIGH (ref 0–0.9)
MONOCYTES NFR BLD AUTO: 15 % — HIGH (ref 2–14)
NEUTROPHILS # BLD AUTO: 8.26 K/UL — HIGH (ref 1.8–7.4)
NEUTROPHILS NFR BLD AUTO: 71 % — SIGNIFICANT CHANGE UP (ref 43–77)
NRBC # BLD: SIGNIFICANT CHANGE UP /100 WBCS (ref 0–0)
PLATELET # BLD AUTO: 383 K/UL — SIGNIFICANT CHANGE UP (ref 150–400)
POTASSIUM SERPL-MCNC: 5.1 MMOL/L — SIGNIFICANT CHANGE UP (ref 3.5–5.3)
POTASSIUM SERPL-SCNC: 5.1 MMOL/L — SIGNIFICANT CHANGE UP (ref 3.5–5.3)
PROT SERPL-MCNC: 6.8 G/DL — SIGNIFICANT CHANGE UP (ref 6–8.3)
RBC # BLD: 3 M/UL — LOW (ref 3.8–5.2)
RBC # FLD: 18.6 % — HIGH (ref 10.3–14.5)
SODIUM SERPL-SCNC: 132 MMOL/L — LOW (ref 135–145)
WBC # BLD: 11.47 K/UL — HIGH (ref 3.8–10.5)
WBC # FLD AUTO: 11.47 K/UL — HIGH (ref 3.8–10.5)

## 2022-04-28 RX ADMIN — Medication 667 MILLIGRAM(S): at 08:18

## 2022-04-28 RX ADMIN — TRAMADOL HYDROCHLORIDE 50 MILLIGRAM(S): 50 TABLET ORAL at 19:33

## 2022-04-28 RX ADMIN — CLOPIDOGREL BISULFATE 75 MILLIGRAM(S): 75 TABLET, FILM COATED ORAL at 15:12

## 2022-04-28 RX ADMIN — ATORVASTATIN CALCIUM 40 MILLIGRAM(S): 80 TABLET, FILM COATED ORAL at 21:43

## 2022-04-28 RX ADMIN — Medication 0.1 MILLIGRAM(S): at 18:26

## 2022-04-28 RX ADMIN — Medication 1 TABLET(S): at 17:18

## 2022-04-28 RX ADMIN — Medication 100 MILLIGRAM(S): at 05:01

## 2022-04-28 RX ADMIN — ERYTHROPOIETIN 10000 UNIT(S): 10000 INJECTION, SOLUTION INTRAVENOUS; SUBCUTANEOUS at 11:19

## 2022-04-28 RX ADMIN — Medication 3 MILLIGRAM(S): at 21:46

## 2022-04-28 RX ADMIN — INSULIN GLARGINE 10 UNIT(S): 100 INJECTION, SOLUTION SUBCUTANEOUS at 21:42

## 2022-04-28 RX ADMIN — Medication 2: at 08:16

## 2022-04-28 RX ADMIN — Medication 1 APPLICATION(S): at 15:14

## 2022-04-28 RX ADMIN — HEPARIN SODIUM 5000 UNIT(S): 5000 INJECTION INTRAVENOUS; SUBCUTANEOUS at 08:16

## 2022-04-28 RX ADMIN — Medication 100 MILLIGRAM(S): at 18:26

## 2022-04-28 RX ADMIN — Medication 60 MILLIGRAM(S): at 05:00

## 2022-04-28 RX ADMIN — HEPARIN SODIUM 5000 UNIT(S): 5000 INJECTION INTRAVENOUS; SUBCUTANEOUS at 19:53

## 2022-04-28 RX ADMIN — PANTOPRAZOLE SODIUM 40 MILLIGRAM(S): 20 TABLET, DELAYED RELEASE ORAL at 05:01

## 2022-04-28 RX ADMIN — Medication 4: at 17:17

## 2022-04-28 RX ADMIN — Medication 1 TABLET(S): at 05:01

## 2022-04-28 RX ADMIN — Medication 1 TABLET(S): at 15:12

## 2022-04-28 RX ADMIN — Medication 100 MILLIGRAM(S): at 19:52

## 2022-04-28 RX ADMIN — Medication 81 MILLIGRAM(S): at 15:13

## 2022-04-28 RX ADMIN — Medication 0.1 MILLIGRAM(S): at 05:01

## 2022-04-28 RX ADMIN — TRAMADOL HYDROCHLORIDE 50 MILLIGRAM(S): 50 TABLET ORAL at 18:33

## 2022-04-28 RX ADMIN — Medication 50 MILLIGRAM(S): at 15:12

## 2022-04-28 RX ADMIN — Medication 667 MILLIGRAM(S): at 17:18

## 2022-04-28 RX ADMIN — Medication 60 MILLIGRAM(S): at 18:35

## 2022-04-28 NOTE — PROGRESS NOTE ADULT - SUBJECTIVE AND OBJECTIVE BOX
PROGRESS NOTE  Patient is a 71y old  Female who presents with a chief complaint of falls ,weakness (28 Apr 2022 07:48)    Chart and available morning labs /imaging are reviewed electronically , urgent issues addressed . More information  is being added upon completion of rounds , when more information is collected and management discussed with consultants , medical staff and social service/case management on the floor   OVERNIGHT  No new issues reported by medical staff . All above noted   Patient is resting in a bed comfortably .No complains ,agrees to go to rehab after tallking to her  , realizes that PT may be benficial  .No distress noted   HPI:  Patient came to ED because she  fell today on her way to dialysis, pt states that she is falling everyday and always feels weak. pt poor historian, missed dialysis today, will need admission for placement. Recent admission 04/05/22 -72yo female bib ems with sob, pt states she has missed the last 2 rounds of dialysis and is due today, and has been having worsening sob, no cough, fever, chills, no chest pain no other complaints .Found to have hyperkalemia Diagnosed with foot infection MRI showed evidence of acute osteomyelitis of L medial malleolus and distal aspect of R 1st toe ,poa . -ID recommended  cefazolin 2g-2g-3g with HD x 4 weeks .Patient was discharged home with home care and iv abx at HD center Palliative care consult requested ,to discuss advance directives and complete MOLST  (22 Apr 2022 16:22)    PAST MEDICAL & SURGICAL HISTORY:  Diabetes mellitus II    HTN (hypertension)    h/o Anxiety attack    Depression    h/o Myocardial infarct 2007    CAD (coronary artery disease)    h/o Hepatitis A 1969  currently resolved, no symptoms    PAD (peripheral artery disease)    Murmur, cardiac    h/o Smoking  quitted 3/2012    CRF (chronic renal failure), unspecified stage    Dialysis patient    HTN (hypertension)    Anemia secondary to renal failure    coronary stent 2007    s/p Ovarian cyst removal    s/p surgical removal of benign Skin lesion epigastric area        MEDICATIONS  (STANDING):  aspirin enteric coated 81 milliGRAM(s) Oral daily  atorvastatin 40 milliGRAM(s) Oral at bedtime  calcium acetate 667 milliGRAM(s) Oral three times a day with meals  ceFAZolin   IVPB 1000 milliGRAM(s) IV Intermittent every 24 hours  cloNIDine 0.1 milliGRAM(s) Oral every 12 hours  clopidogrel Tablet 75 milliGRAM(s) Oral daily  dextrose 5%. 1000 milliLiter(s) (100 mL/Hr) IV Continuous <Continuous>  dextrose 5%. 1000 milliLiter(s) (50 mL/Hr) IV Continuous <Continuous>  dextrose 50% Injectable 25 Gram(s) IV Push once  dextrose 50% Injectable 12.5 Gram(s) IV Push once  dextrose 50% Injectable 25 Gram(s) IV Push once  diltiazem    Tablet 60 milliGRAM(s) Oral every 8 hours  epoetin fawad-epbx (RETACRIT) Injectable 92795 Unit(s) IV Push <User Schedule>  glucagon  Injectable 1 milliGRAM(s) IntraMuscular once  glucagon  Injectable 1 milliGRAM(s) IntraMuscular once  heparin   Injectable 5000 Unit(s) SubCutaneous every 12 hours  imipramine 50 milliGRAM(s) Oral daily  insulin glargine Injectable (LANTUS) 10 Unit(s) SubCutaneous at bedtime  insulin lispro (ADMELOG) corrective regimen sliding scale   SubCutaneous three times a day before meals  insulin lispro (ADMELOG) corrective regimen sliding scale   SubCutaneous at bedtime  lactobacillus acidophilus 1 Tablet(s) Oral two times a day  metoprolol tartrate 100 milliGRAM(s) Oral every 12 hours  multivitamin 1 Tablet(s) Oral daily  pantoprazole    Tablet 40 milliGRAM(s) Oral before breakfast  povidone iodine 10% Solution 1 Application(s) Topical daily  senna 2 Tablet(s) Oral at bedtime    MEDICATIONS  (PRN):  acetaminophen     Tablet .. 650 milliGRAM(s) Oral every 6 hours PRN Temp greater or equal to 38C (100.4F), Mild Pain (1 - 3)  aluminum hydroxide/magnesium hydroxide/simethicone Suspension 30 milliLiter(s) Oral every 4 hours PRN Dyspepsia  dextrose Oral Gel 15 Gram(s) Oral once PRN Blood Glucose LESS THAN 70 milliGRAM(s)/deciliter  melatonin 3 milliGRAM(s) Oral at bedtime PRN Insomnia  ondansetron Injectable 4 milliGRAM(s) IV Push every 8 hours PRN Nausea and/or Vomiting  traMADol 50 milliGRAM(s) Oral three times a day PRN Moderate Pain (4 - 6)      OBJECTIVE    T(C): 36.8 (04-28-22 @ 14:05), Max: 37.1 (04-27-22 @ 20:09)  HR: 69 (04-28-22 @ 14:05) (62 - 69)  BP: 164/72 (04-28-22 @ 14:05) (121/67 - 164/72)  RR: 17 (04-28-22 @ 14:05) (17 - 18)  SpO2: 95% (04-28-22 @ 14:05) (92% - 96%)  Wt(kg): --  I&O's Summary    28 Apr 2022 07:01  -  28 Apr 2022 18:22  --------------------------------------------------------  IN: 0 mL / OUT: 1000 mL / NET: -1000 mL          REVIEW OF SYSTEMS:  CONSTITUTIONAL: No fever, weight loss, or fatigue  EYES: No eye pain, visual disturbances, or discharge  ENMT:   No sinus or throat pain  NECK: No pain or stiffness  RESPIRATORY: No cough, wheezing, chills or hemoptysis; No shortness of breath  CARDIOVASCULAR: No chest pain, palpitations, dizziness, or leg swelling  GASTROINTESTINAL: No abdominal pain. No nausea, vomiting; No diarrhea or constipation. No melena or hematochezia.  GENITOURINARY: No dysuria, frequency, hematuria, or incontinence  NEUROLOGICAL: No headaches, memory loss, loss of strength, numbness, or tremors  SKIN: No itching, burning, rashes, or lesions   MUSCULOSKELETAL: No joint pain or swelling; No muscle, back, or extremity pain    PHYSICAL EXAM:  Appearance: NAD. VS past 24 hrs -as above   HEENT:   Moist oral mucosa. Conjunctiva clear b/l.   Neck : supple  Respiratory: Lungs CTAB.  Gastrointestinal:  Soft, nontender. No rebound. No rigidity. BS present	  Cardiovascular: RRR ,S1S2 present  Neurologic: Non-focal. Moving all extremities.  Extremities: No edema. No erythema. No calf tenderness.  Skin: No rashes, No ecchymoses, No cyanosis.	  wounds ,skin lesions-See skin assesment flow sheet   LABS:                        9.6    11.47 )-----------( 383      ( 28 Apr 2022 08:06 )             30.0     04-28    132<L>  |  96  |  54<H>  ----------------------------<  142<H>  5.1   |  29  |  5.70<H>    Ca    9.7      28 Apr 2022 08:06    TPro  6.8  /  Alb  2.6<L>  /  TBili  0.3  /  DBili  <0.1  /  AST  24  /  ALT  <6<L>  /  AlkPhos  90  04-28    CAPILLARY BLOOD GLUCOSE      POCT Blood Glucose.: 223 mg/dL (28 Apr 2022 16:53)  POCT Blood Glucose.: 132 mg/dL (28 Apr 2022 13:25)  POCT Blood Glucose.: 161 mg/dL (28 Apr 2022 07:35)  POCT Blood Glucose.: 218 mg/dL (27 Apr 2022 20:59)          Culture - Blood (collected 22 Apr 2022 18:38)  Source: .Blood Blood-Peripheral  Final Report (27 Apr 2022 19:01):    No Growth Final    Culture - Blood (collected 22 Apr 2022 18:38)  Source: .Blood Blood-Peripheral  Final Report (27 Apr 2022 19:01):    No Growth Final      RADIOLOGY & ADDITIONAL TESTS:   reviewed elctronically  ASSESSMENT/PLAN: 	    25 minutes aggregate time was spent on this visit, 50% visit time spent in care co-ordination with other attendings and counselling patient .I have discussed care plan with patient / HCP/family member ,who expressed understanding of problems treatment and their effect and side effects, alternatives in details. I have asked if they have any questions and concerns and appropriately addressed them to best of my ability.

## 2022-04-28 NOTE — PROGRESS NOTE ADULT - SUBJECTIVE AND OBJECTIVE BOX
Patient is a 71y Female with a known history of :  Falls [W19.XXXA]    DM (diabetes mellitus) [E11.9]    Acute osteomyelitis [M86.10]    ESRD on dialysis [N18.6]    Anemia secondary to renal failure [N18.9]    Atrial fibrillation [I48.91]    PAD (peripheral artery disease) [I73.9]    Non-compliant patient [Z91.19]    Prophylactic measure [Z29.9]    Need for follow-up by  [Z78.9]    Gangrenous toe [I96]    Chronic osteomyelitis [M86.60]      HPI:  Patient came to ED because she  fell today on her way to dialysis, pt states that she is falling everyday and always feels weak. pt poor historian, missed dialysis today, will need admission for placement. Recent admission 04/05/22 -70yo female bib ems with sob, pt states she has missed the last 2 rounds of dialysis and is due today, and has been having worsening sob, no cough, fever, chills, no chest pain no other complaints .Found to have hyperkalemia Diagnosed with foot infection MRI showed evidence of acute osteomyelitis of L medial malleolus and distal aspect of R 1st toe ,poa . -ID recommended  cefazolin 2g-2g-3g with HD x 4 weeks .Patient was discharged home with home care and iv abx at HD center Palliative care consult requested ,to discuss advance directives and complete MOLST  (22 Apr 2022 16:22)      REVIEW OF SYSTEMS:    CONSTITUTIONAL: No fever, weight loss, or fatigue  EYES: No eye pain, visual disturbances, or discharge  ENMT:  No difficulty hearing, tinnitus, vertigo; No sinus or throat pain  NECK: No pain or stiffness  BREASTS: No pain, masses, or nipple discharge  RESPIRATORY: No cough, wheezing, chills or hemoptysis; No shortness of breath  CARDIOVASCULAR: No chest pain, palpitations, dizziness, or leg swelling  GASTROINTESTINAL: No abdominal or epigastric pain. No nausea, vomiting, or hematemesis; No diarrhea or constipation. No melena or hematochezia.  GENITOURINARY: No dysuria, frequency, hematuria, or incontinence  NEUROLOGICAL: No headaches, memory loss, loss of strength, numbness, or tremors  SKIN: No itching, burning, rashes, or lesions   LYMPH NODES: No enlarged glands  ENDOCRINE: No heat or cold intolerance; No hair loss  MUSCULOSKELETAL: No joint pain or swelling; No muscle, back, or extremity pain  PSYCHIATRIC: No depression, anxiety, mood swings, or difficulty sleeping  HEME/LYMPH: No easy bruising, or bleeding gums  ALLERGY AND IMMUNOLOGIC: No hives or eczema    MEDICATIONS  (STANDING):  aspirin enteric coated 81 milliGRAM(s) Oral daily  atorvastatin 40 milliGRAM(s) Oral at bedtime  calcium acetate 667 milliGRAM(s) Oral three times a day with meals  ceFAZolin   IVPB 1000 milliGRAM(s) IV Intermittent every 24 hours  cloNIDine 0.1 milliGRAM(s) Oral every 12 hours  clopidogrel Tablet 75 milliGRAM(s) Oral daily  dextrose 5%. 1000 milliLiter(s) (100 mL/Hr) IV Continuous <Continuous>  dextrose 5%. 1000 milliLiter(s) (50 mL/Hr) IV Continuous <Continuous>  dextrose 50% Injectable 25 Gram(s) IV Push once  dextrose 50% Injectable 12.5 Gram(s) IV Push once  dextrose 50% Injectable 25 Gram(s) IV Push once  diltiazem    Tablet 60 milliGRAM(s) Oral every 8 hours  epoetin fawad-epbx (RETACRIT) Injectable 77747 Unit(s) IV Push <User Schedule>  glucagon  Injectable 1 milliGRAM(s) IntraMuscular once  glucagon  Injectable 1 milliGRAM(s) IntraMuscular once  heparin   Injectable 5000 Unit(s) SubCutaneous every 12 hours  imipramine 50 milliGRAM(s) Oral daily  insulin glargine Injectable (LANTUS) 10 Unit(s) SubCutaneous at bedtime  insulin lispro (ADMELOG) corrective regimen sliding scale   SubCutaneous at bedtime  insulin lispro (ADMELOG) corrective regimen sliding scale   SubCutaneous three times a day before meals  lactobacillus acidophilus 1 Tablet(s) Oral two times a day  metoprolol tartrate 100 milliGRAM(s) Oral every 12 hours  multivitamin 1 Tablet(s) Oral daily  pantoprazole    Tablet 40 milliGRAM(s) Oral before breakfast  povidone iodine 10% Solution 1 Application(s) Topical daily  senna 2 Tablet(s) Oral at bedtime    MEDICATIONS  (PRN):  acetaminophen     Tablet .. 650 milliGRAM(s) Oral every 6 hours PRN Temp greater or equal to 38C (100.4F), Mild Pain (1 - 3)  aluminum hydroxide/magnesium hydroxide/simethicone Suspension 30 milliLiter(s) Oral every 4 hours PRN Dyspepsia  dextrose Oral Gel 15 Gram(s) Oral once PRN Blood Glucose LESS THAN 70 milliGRAM(s)/deciliter  melatonin 3 milliGRAM(s) Oral at bedtime PRN Insomnia  ondansetron Injectable 4 milliGRAM(s) IV Push every 8 hours PRN Nausea and/or Vomiting  traMADol 50 milliGRAM(s) Oral three times a day PRN Moderate Pain (4 - 6)      ALLERGIES: latex (Unknown)  No Known Drug Allergies      FAMILY HISTORY:      PHYSICAL EXAMINATION:  -----------------------------  T(C): 36.9 (04-28-22 @ 04:53), Max: 37.1 (04-27-22 @ 20:09)  HR: 66 (04-28-22 @ 04:53) (62 - 69)  BP: 145/66 (04-28-22 @ 04:53) (121/67 - 156/67)  RR: 18 (04-28-22 @ 04:53) (17 - 18)  SpO2: 92% (04-28-22 @ 04:53) (92% - 95%)  Wt(kg): --        VITALS  T(C): 36.9 (04-28-22 @ 04:53), Max: 37.1 (04-27-22 @ 20:09)  HR: 66 (04-28-22 @ 04:53) (62 - 69)  BP: 145/66 (04-28-22 @ 04:53) (121/67 - 156/67)  RR: 18 (04-28-22 @ 04:53) (17 - 18)  SpO2: 92% (04-28-22 @ 04:53) (92% - 95%)    Constitutional: well developed, normal appearance, well groomed, well nourished, no deformities and no acute distress.   Eyes: the conjunctiva exhibited no abnormalities and the eyelids demonstrated no xanthelasmas.   HEENT: normal oral mucosa, no oral pallor and no oral cyanosis.   Neck: normal jugular venous A waves present, normal jugular venous V waves present and no jugular venous wilson A waves.   Pulmonary: no respiratory distress, normal respiratory rhythm and effort, no accessory muscle use and lungs were clear to auscultation bilaterally.   Cardiovascular: heart rate and rhythm were normal, normal S1 and S2 and no murmur, gallop, rub, heave or thrill are present.   Abdomen: soft, non-tender, no hepato-splenomegaly and no abdominal mass palpated.   Musculoskeletal: the gait could not be assessed..   Extremities: no clubbing of the fingernails, no localized cyanosis, no petechial hemorrhages and no ischemic changes.   Skin: normal skin color and pigmentation, no rash, no venous stasis, no skin lesions, no skin ulcer and no xanthoma was observed.   Psychiatric: oriented to person, place, and time, the affect was normal, the mood was normal and not feeling anxious.     LABS:   --------  04-27    133<L>  |  98  |  36<H>  ----------------------------<  181<H>  5.0   |  27  |  4.40<H>    Ca    9.5      27 Apr 2022 08:51                           9.4    14.62 )-----------( 340      ( 27 Apr 2022 08:51 )             30.1                 RADIOLOGY:  -----------------    ECG:     ECHO:

## 2022-04-28 NOTE — PROGRESS NOTE ADULT - SUBJECTIVE AND OBJECTIVE BOX
Neurology follow up note    SHILO [id number]      Interval History:    Patient feels ok no new complaints.    Allergies    latex (Unknown)  No Known Drug Allergies    Intolerances        MEDICATIONS    acetaminophen     Tablet .. 650 milliGRAM(s) Oral every 6 hours PRN  aluminum hydroxide/magnesium hydroxide/simethicone Suspension 30 milliLiter(s) Oral every 4 hours PRN  aspirin enteric coated 81 milliGRAM(s) Oral daily  atorvastatin 40 milliGRAM(s) Oral at bedtime  calcium acetate 667 milliGRAM(s) Oral three times a day with meals  ceFAZolin   IVPB 1000 milliGRAM(s) IV Intermittent every 24 hours  cloNIDine 0.1 milliGRAM(s) Oral every 12 hours  clopidogrel Tablet 75 milliGRAM(s) Oral daily  dextrose 5%. 1000 milliLiter(s) IV Continuous <Continuous>  dextrose 5%. 1000 milliLiter(s) IV Continuous <Continuous>  dextrose 50% Injectable 25 Gram(s) IV Push once  dextrose 50% Injectable 12.5 Gram(s) IV Push once  dextrose 50% Injectable 25 Gram(s) IV Push once  dextrose Oral Gel 15 Gram(s) Oral once PRN  diltiazem    Tablet 60 milliGRAM(s) Oral every 8 hours  epoetin fawad-epbx (RETACRIT) Injectable 10012 Unit(s) IV Push <User Schedule>  glucagon  Injectable 1 milliGRAM(s) IntraMuscular once  glucagon  Injectable 1 milliGRAM(s) IntraMuscular once  heparin   Injectable 5000 Unit(s) SubCutaneous every 12 hours  imipramine 50 milliGRAM(s) Oral daily  insulin glargine Injectable (LANTUS) 10 Unit(s) SubCutaneous at bedtime  insulin lispro (ADMELOG) corrective regimen sliding scale   SubCutaneous at bedtime  insulin lispro (ADMELOG) corrective regimen sliding scale   SubCutaneous three times a day before meals  lactobacillus acidophilus 1 Tablet(s) Oral two times a day  melatonin 3 milliGRAM(s) Oral at bedtime PRN  metoprolol tartrate 100 milliGRAM(s) Oral every 12 hours  multivitamin 1 Tablet(s) Oral daily  ondansetron Injectable 4 milliGRAM(s) IV Push every 8 hours PRN  pantoprazole    Tablet 40 milliGRAM(s) Oral before breakfast  povidone iodine 10% Solution 1 Application(s) Topical daily  senna 2 Tablet(s) Oral at bedtime  traMADol 50 milliGRAM(s) Oral three times a day PRN              Vital Signs Last 24 Hrs  T(C): 36.9 (28 Apr 2022 04:53), Max: 37.1 (27 Apr 2022 20:09)  T(F): 98.4 (28 Apr 2022 04:53), Max: 98.7 (27 Apr 2022 20:09)  HR: 66 (28 Apr 2022 04:53) (62 - 69)  BP: 145/66 (28 Apr 2022 04:53) (121/67 - 156/67)  BP(mean): --  RR: 18 (28 Apr 2022 04:53) (17 - 18)  SpO2: 92% (28 Apr 2022 04:53) (92% - 95%)      REVIEW OF SYSTEMS:  Constitutional:  No fever, chills, or night sweats.  Head:  No headaches.  Eyes:  No double vision or blurry vision.  Ears: No ringing in the ears.  Neck:  No neck pain.  Cardiovascular:  No chest pain.  Respiratory:  No shortness of breath.  Abdomen:  No nausea, vomiting, or abdominal pain.  Extremities/Neurological:  Positive numbness and tingling in bilateral feet.  Spine:  No history of back pain.    PHYSICAL EXAMINATION:    HEENT:  Head:  Normocephalic, atraumatic.  Eyes:  No scleral icterus.  Ears:  Hearing bilaterally intact.  NECK:  Supple.  RESPIRATORY:  Good air entry bilaterally.  CARDIOVASCULAR:  S1 and S2 heard.  ABDOMEN:  Soft, nontender.  EXTREMITIES:  No clubbing or cyanosis was noted.  Positive sign of scars and blood on the bottom of her feet from scrapes.      NEUROLOGIC:  The patient is awake and alert.  Extraocular movements were intact.  Upon conversing with the patient, at times, she may be slightly forgetful, but as per my conversation with the spouse, he has noticed that lately as well.  Speech was fluent.  Smile was symmetric.  Full visual fields.  Motor:  Bilateral upper 5/5.  Bilateral lower 4/5.  The patient had markedly impaired proprioception in bilateral lower extremities.  Knee jerks bilaterally were trace, suspect this could be secondary to diabetes and possibly peripheral neuropathy.                 LABS:  CBC Full  -  ( 27 Apr 2022 08:51 )  WBC Count : 14.62 K/uL  RBC Count : 2.97 M/uL  Hemoglobin : 9.4 g/dL  Hematocrit : 30.1 %  Platelet Count - Automated : 340 K/uL  Mean Cell Volume : 101.3 fl  Mean Cell Hemoglobin : 31.6 pg  Mean Cell Hemoglobin Concentration : 31.2 gm/dL  Auto Neutrophil # : x  Auto Lymphocyte # : x  Auto Monocyte # : x  Auto Eosinophil # : x  Auto Basophil # : x  Auto Neutrophil % : x  Auto Lymphocyte % : x  Auto Monocyte % : x  Auto Eosinophil % : x  Auto Basophil % : x      04-27    133<L>  |  98  |  36<H>  ----------------------------<  181<H>  5.0   |  27  |  4.40<H>    Ca    9.5      27 Apr 2022 08:51      Hemoglobin A1C:       Vitamin B12         RADIOLOGY    ANALYSIS AND PLAN:  This is a 71-year-old with a history of difficulty ambulating, ataxia, and falls.  For ataxia and falls, suspect this is multifactorial secondary to diabetes and end-stage kidney disease and deconditioning, age-related changes leading to peripheral neuropathy type of component as well.  Suspect less likely this is a primary central nervous system event secondary to as per the patient and her spouse both legs become weak.  This points toward more a peripheral type of etiology.  Would recommend physical therapy evaluation.  For history of diabetes, strict control of blood sugars.  For history of hypertension, monitor systolic blood pressure.  For hyperlipidemia, continue the patient on statin.  The patient appears to be on multiple blood thinning medications, would recommend risks versus benefits must be weighed in regards to the patient's history of falls.  For possibly mild cognitive impairment  ESRD nephology follow up   do to high risk of fall medical team noted AC jade on antiplatelets     Spoke with spouse, Geoffrey, at [phone] 4/24 today and the patient in great detail.  Patient now stating she does not want to go to rehab she wants to she how she does with physical therapy here     Greater than 24 minutes of time was spent with the patient, plan of care, reviewing data, with greater than 50% of the visit was spent counseling and/or coordinating care with multidisciplinary healthcare team         Neurology follow up note    SHILO [id number]      Interval History:    Patient feels ok no new complaints.    Allergies    latex (Unknown)  No Known Drug Allergies    Intolerances        MEDICATIONS    acetaminophen     Tablet .. 650 milliGRAM(s) Oral every 6 hours PRN  aluminum hydroxide/magnesium hydroxide/simethicone Suspension 30 milliLiter(s) Oral every 4 hours PRN  aspirin enteric coated 81 milliGRAM(s) Oral daily  atorvastatin 40 milliGRAM(s) Oral at bedtime  calcium acetate 667 milliGRAM(s) Oral three times a day with meals  ceFAZolin   IVPB 1000 milliGRAM(s) IV Intermittent every 24 hours  cloNIDine 0.1 milliGRAM(s) Oral every 12 hours  clopidogrel Tablet 75 milliGRAM(s) Oral daily  dextrose 5%. 1000 milliLiter(s) IV Continuous <Continuous>  dextrose 5%. 1000 milliLiter(s) IV Continuous <Continuous>  dextrose 50% Injectable 25 Gram(s) IV Push once  dextrose 50% Injectable 12.5 Gram(s) IV Push once  dextrose 50% Injectable 25 Gram(s) IV Push once  dextrose Oral Gel 15 Gram(s) Oral once PRN  diltiazem    Tablet 60 milliGRAM(s) Oral every 8 hours  epoetin fawad-epbx (RETACRIT) Injectable 64082 Unit(s) IV Push <User Schedule>  glucagon  Injectable 1 milliGRAM(s) IntraMuscular once  glucagon  Injectable 1 milliGRAM(s) IntraMuscular once  heparin   Injectable 5000 Unit(s) SubCutaneous every 12 hours  imipramine 50 milliGRAM(s) Oral daily  insulin glargine Injectable (LANTUS) 10 Unit(s) SubCutaneous at bedtime  insulin lispro (ADMELOG) corrective regimen sliding scale   SubCutaneous at bedtime  insulin lispro (ADMELOG) corrective regimen sliding scale   SubCutaneous three times a day before meals  lactobacillus acidophilus 1 Tablet(s) Oral two times a day  melatonin 3 milliGRAM(s) Oral at bedtime PRN  metoprolol tartrate 100 milliGRAM(s) Oral every 12 hours  multivitamin 1 Tablet(s) Oral daily  ondansetron Injectable 4 milliGRAM(s) IV Push every 8 hours PRN  pantoprazole    Tablet 40 milliGRAM(s) Oral before breakfast  povidone iodine 10% Solution 1 Application(s) Topical daily  senna 2 Tablet(s) Oral at bedtime  traMADol 50 milliGRAM(s) Oral three times a day PRN              Vital Signs Last 24 Hrs  T(C): 36.9 (28 Apr 2022 04:53), Max: 37.1 (27 Apr 2022 20:09)  T(F): 98.4 (28 Apr 2022 04:53), Max: 98.7 (27 Apr 2022 20:09)  HR: 66 (28 Apr 2022 04:53) (62 - 69)  BP: 145/66 (28 Apr 2022 04:53) (121/67 - 156/67)  BP(mean): --  RR: 18 (28 Apr 2022 04:53) (17 - 18)  SpO2: 92% (28 Apr 2022 04:53) (92% - 95%)      REVIEW OF SYSTEMS:  Constitutional:  No fever, chills, or night sweats.  Head:  No headaches.  Eyes:  No double vision or blurry vision.  Ears: No ringing in the ears.  Neck:  No neck pain.  Cardiovascular:  No chest pain.  Respiratory:  No shortness of breath.  Abdomen:  No nausea, vomiting, or abdominal pain.  Extremities/Neurological:  Positive numbness and tingling in bilateral feet.  Spine:  No history of back pain.    PHYSICAL EXAMINATION:    HEENT:  Head:  Normocephalic, atraumatic.  Eyes:  No scleral icterus.  Ears:  Hearing bilaterally intact.  NECK:  Supple.  RESPIRATORY:  Good air entry bilaterally.  CARDIOVASCULAR:  S1 and S2 heard.  ABDOMEN:  Soft, nontender.  EXTREMITIES:  No clubbing or cyanosis was noted.  Positive sign of scars and blood on the bottom of her feet from scrapes.      NEUROLOGIC:  The patient is awake and alert.  Extraocular movements were intact.  Upon conversing with the patient, at times, she may be slightly forgetful, but as per my conversation with the spouse, he has noticed that lately as well.  Speech was fluent.  Smile was symmetric.  Full visual fields.  Motor:  Bilateral upper 5/5.  Bilateral lower 4/5.  The patient had markedly impaired proprioception in bilateral lower extremities.  Knee jerks bilaterally were trace, suspect this could be secondary to diabetes and possibly peripheral neuropathy.                 LABS:  CBC Full  -  ( 27 Apr 2022 08:51 )  WBC Count : 14.62 K/uL  RBC Count : 2.97 M/uL  Hemoglobin : 9.4 g/dL  Hematocrit : 30.1 %  Platelet Count - Automated : 340 K/uL  Mean Cell Volume : 101.3 fl  Mean Cell Hemoglobin : 31.6 pg  Mean Cell Hemoglobin Concentration : 31.2 gm/dL  Auto Neutrophil # : x  Auto Lymphocyte # : x  Auto Monocyte # : x  Auto Eosinophil # : x  Auto Basophil # : x  Auto Neutrophil % : x  Auto Lymphocyte % : x  Auto Monocyte % : x  Auto Eosinophil % : x  Auto Basophil % : x      04-27    133<L>  |  98  |  36<H>  ----------------------------<  181<H>  5.0   |  27  |  4.40<H>    Ca    9.5      27 Apr 2022 08:51      Hemoglobin A1C:       Vitamin B12         RADIOLOGY    ANALYSIS AND PLAN:  This is a 71-year-old with a history of difficulty ambulating, ataxia, and falls.  For ataxia and falls, suspect this is multifactorial secondary to diabetes and end-stage kidney disease and deconditioning, age-related changes leading to peripheral neuropathy type of component as well.  Suspect less likely this is a primary central nervous system event secondary to as per the patient and her spouse both legs become weak.  This points toward more a peripheral type of etiology.  Would recommend physical therapy evaluation.  For history of diabetes, strict control of blood sugars.  For history of hypertension, monitor systolic blood pressure.  For hyperlipidemia, continue the patient on statin.  The patient appears to be on multiple blood thinning medications, would recommend risks versus benefits must be weighed in regards to the patient's history of falls.  For possibly mild cognitive impairment  ESRD nephology follow up   do to high risk of fall medical team noted AC jade on antiplatelets     Spoke with spouse, Geoffrey, at [phone] 4/24 today and the patient in great detail.  Patient now stating she does not want to go to rehab she wants to she how she does with physical therapy here     Greater than 22 minutes of time was spent with the patient, plan of care, reviewing data, with greater than 50% of the visit was spent counseling and/or coordinating care with multidisciplinary healthcare team

## 2022-04-28 NOTE — PROGRESS NOTE ADULT - SUBJECTIVE AND OBJECTIVE BOX
Date/Time Patient Seen:  		  Referring MD:   Data Reviewed	       Patient is a 71y old  Female who presents with a chief complaint of falls ,weakness (27 Apr 2022 11:45)      Subjective/HPI     PAST MEDICAL & SURGICAL HISTORY:  Diabetes mellitus II    HTN (hypertension)    h/o Anxiety attack    Depression    h/o Myocardial infarct 2007    CAD (coronary artery disease)    CAD (coronary artery disease)    h/o Hepatitis A 1969  currently resolved, no symptoms    PAD (peripheral artery disease)    Murmur, cardiac    h/o Smoking  quitted 3/2012    CRF (chronic renal failure), unspecified stage    Dialysis patient    Anemia secondary to renal failure    HTN (hypertension)    coronary stent 2007    s/p Ovarian cyst removal    s/p surgical removal of benign Skin lesion epigastric area          Medication list         MEDICATIONS  (STANDING):  aspirin enteric coated 81 milliGRAM(s) Oral daily  atorvastatin 40 milliGRAM(s) Oral at bedtime  calcium acetate 667 milliGRAM(s) Oral three times a day with meals  ceFAZolin   IVPB 1000 milliGRAM(s) IV Intermittent every 24 hours  cloNIDine 0.1 milliGRAM(s) Oral every 12 hours  clopidogrel Tablet 75 milliGRAM(s) Oral daily  dextrose 5%. 1000 milliLiter(s) (100 mL/Hr) IV Continuous <Continuous>  dextrose 5%. 1000 milliLiter(s) (50 mL/Hr) IV Continuous <Continuous>  dextrose 50% Injectable 25 Gram(s) IV Push once  dextrose 50% Injectable 12.5 Gram(s) IV Push once  dextrose 50% Injectable 25 Gram(s) IV Push once  diltiazem    Tablet 60 milliGRAM(s) Oral every 8 hours  epoetin fawad-epbx (RETACRIT) Injectable 34354 Unit(s) IV Push <User Schedule>  glucagon  Injectable 1 milliGRAM(s) IntraMuscular once  glucagon  Injectable 1 milliGRAM(s) IntraMuscular once  heparin   Injectable 5000 Unit(s) SubCutaneous every 12 hours  imipramine 50 milliGRAM(s) Oral daily  insulin glargine Injectable (LANTUS) 10 Unit(s) SubCutaneous at bedtime  insulin lispro (ADMELOG) corrective regimen sliding scale   SubCutaneous at bedtime  insulin lispro (ADMELOG) corrective regimen sliding scale   SubCutaneous three times a day before meals  lactobacillus acidophilus 1 Tablet(s) Oral two times a day  metoprolol tartrate 100 milliGRAM(s) Oral every 12 hours  multivitamin 1 Tablet(s) Oral daily  pantoprazole    Tablet 40 milliGRAM(s) Oral before breakfast  povidone iodine 10% Solution 1 Application(s) Topical daily  senna 2 Tablet(s) Oral at bedtime    MEDICATIONS  (PRN):  acetaminophen     Tablet .. 650 milliGRAM(s) Oral every 6 hours PRN Temp greater or equal to 38C (100.4F), Mild Pain (1 - 3)  aluminum hydroxide/magnesium hydroxide/simethicone Suspension 30 milliLiter(s) Oral every 4 hours PRN Dyspepsia  dextrose Oral Gel 15 Gram(s) Oral once PRN Blood Glucose LESS THAN 70 milliGRAM(s)/deciliter  melatonin 3 milliGRAM(s) Oral at bedtime PRN Insomnia  ondansetron Injectable 4 milliGRAM(s) IV Push every 8 hours PRN Nausea and/or Vomiting  traMADol 50 milliGRAM(s) Oral three times a day PRN Moderate Pain (4 - 6)         Vitals log        ICU Vital Signs Last 24 Hrs  T(C): 36.9 (28 Apr 2022 04:53), Max: 37.1 (27 Apr 2022 20:09)  T(F): 98.4 (28 Apr 2022 04:53), Max: 98.7 (27 Apr 2022 20:09)  HR: 66 (28 Apr 2022 04:53) (62 - 69)  BP: 145/66 (28 Apr 2022 04:53) (121/67 - 156/67)  BP(mean): --  ABP: --  ABP(mean): --  RR: 18 (28 Apr 2022 04:53) (17 - 18)  SpO2: 92% (28 Apr 2022 04:53) (92% - 95%)           Input and Output:  I&O's Detail    26 Apr 2022 07:01  -  27 Apr 2022 07:00  --------------------------------------------------------  IN:  Total IN: 0 mL    OUT:    Other (mL): 1000 mL  Total OUT: 1000 mL    Total NET: -1000 mL          Lab Data                        9.4    14.62 )-----------( 340      ( 27 Apr 2022 08:51 )             30.1     04-27    133<L>  |  98  |  36<H>  ----------------------------<  181<H>  5.0   |  27  |  4.40<H>    Ca    9.5      27 Apr 2022 08:51              Review of Systems	      Objective     Physical Examination    heart s1s2  lung dec BS  abd soft      Pertinent Lab findings & Imaging      Dexter:  NO   Adequate UO     I&O's Detail    26 Apr 2022 07:01  -  27 Apr 2022 07:00  --------------------------------------------------------  IN:  Total IN: 0 mL    OUT:    Other (mL): 1000 mL  Total OUT: 1000 mL    Total NET: -1000 mL               Discussed with:     Cultures:	        Radiology

## 2022-04-29 ENCOUNTER — TRANSCRIPTION ENCOUNTER (OUTPATIENT)
Age: 72
End: 2022-04-29

## 2022-04-29 LAB
ANION GAP SERPL CALC-SCNC: 8 MMOL/L — SIGNIFICANT CHANGE UP (ref 5–17)
BUN SERPL-MCNC: 33 MG/DL — HIGH (ref 7–23)
CALCIUM SERPL-MCNC: 9.6 MG/DL — SIGNIFICANT CHANGE UP (ref 8.5–10.1)
CHLORIDE SERPL-SCNC: 97 MMOL/L — SIGNIFICANT CHANGE UP (ref 96–108)
CO2 SERPL-SCNC: 28 MMOL/L — SIGNIFICANT CHANGE UP (ref 22–31)
CREAT SERPL-MCNC: 3.9 MG/DL — HIGH (ref 0.5–1.3)
EGFR: 12 ML/MIN/1.73M2 — LOW
GLUCOSE SERPL-MCNC: 210 MG/DL — HIGH (ref 70–99)
HAV IGM SER-ACNC: SIGNIFICANT CHANGE UP
HBV CORE IGM SER-ACNC: SIGNIFICANT CHANGE UP
HBV SURFACE AG SER-ACNC: SIGNIFICANT CHANGE UP
HCT VFR BLD CALC: 31.2 % — LOW (ref 34.5–45)
HCV AB S/CO SERPL IA: 0.13 S/CO — SIGNIFICANT CHANGE UP (ref 0–0.99)
HCV AB SERPL-IMP: SIGNIFICANT CHANGE UP
HGB BLD-MCNC: 9.7 G/DL — LOW (ref 11.5–15.5)
MCHC RBC-ENTMCNC: 31.1 GM/DL — LOW (ref 32–36)
MCHC RBC-ENTMCNC: 31.2 PG — SIGNIFICANT CHANGE UP (ref 27–34)
MCV RBC AUTO: 100.3 FL — HIGH (ref 80–100)
NRBC # BLD: 0 /100 WBCS — SIGNIFICANT CHANGE UP (ref 0–0)
PLATELET # BLD AUTO: 335 K/UL — SIGNIFICANT CHANGE UP (ref 150–400)
POTASSIUM SERPL-MCNC: 4.5 MMOL/L — SIGNIFICANT CHANGE UP (ref 3.5–5.3)
POTASSIUM SERPL-SCNC: 4.5 MMOL/L — SIGNIFICANT CHANGE UP (ref 3.5–5.3)
RBC # BLD: 3.11 M/UL — LOW (ref 3.8–5.2)
RBC # FLD: 19.1 % — HIGH (ref 10.3–14.5)
SARS-COV-2 RNA SPEC QL NAA+PROBE: SIGNIFICANT CHANGE UP
SODIUM SERPL-SCNC: 133 MMOL/L — LOW (ref 135–145)
WBC # BLD: 11.91 K/UL — HIGH (ref 3.8–10.5)
WBC # FLD AUTO: 11.91 K/UL — HIGH (ref 3.8–10.5)

## 2022-04-29 PROCEDURE — 99232 SBSQ HOSP IP/OBS MODERATE 35: CPT

## 2022-04-29 RX ORDER — INSULIN ASPART 100 [IU]/ML
3 INJECTION, SOLUTION SUBCUTANEOUS
Qty: 0 | Refills: 0 | DISCHARGE

## 2022-04-29 RX ORDER — INSULIN LISPRO 100/ML
0 VIAL (ML) SUBCUTANEOUS
Qty: 0 | Refills: 0 | DISCHARGE
Start: 2022-04-29

## 2022-04-29 RX ORDER — INSULIN DEGLUDEC 100 U/ML
15 INJECTION, SOLUTION SUBCUTANEOUS
Qty: 0 | Refills: 0 | DISCHARGE

## 2022-04-29 RX ORDER — INSULIN GLARGINE 100 [IU]/ML
12 INJECTION, SOLUTION SUBCUTANEOUS AT BEDTIME
Refills: 0 | Status: DISCONTINUED | OUTPATIENT
Start: 2022-04-29 | End: 2022-05-02

## 2022-04-29 RX ORDER — HEPARIN SODIUM 5000 [USP'U]/ML
5000 INJECTION INTRAVENOUS; SUBCUTANEOUS
Qty: 0 | Refills: 0 | DISCHARGE
Start: 2022-04-29

## 2022-04-29 RX ORDER — LIDOCAINE AND PRILOCAINE CREAM 25; 25 MG/G; MG/G
1 CREAM TOPICAL
Qty: 0 | Refills: 0 | DISCHARGE

## 2022-04-29 RX ORDER — INSULIN GLARGINE 100 [IU]/ML
12 INJECTION, SOLUTION SUBCUTANEOUS
Qty: 0 | Refills: 0 | DISCHARGE
Start: 2022-04-29

## 2022-04-29 RX ORDER — SENNA PLUS 8.6 MG/1
2 TABLET ORAL
Qty: 0 | Refills: 0 | DISCHARGE
Start: 2022-04-29

## 2022-04-29 RX ORDER — POVIDONE-IODINE 5 %
1 AEROSOL (ML) TOPICAL
Qty: 0 | Refills: 0 | DISCHARGE
Start: 2022-04-29

## 2022-04-29 RX ADMIN — Medication 100 MILLIGRAM(S): at 05:01

## 2022-04-29 RX ADMIN — Medication 100 MILLIGRAM(S): at 17:30

## 2022-04-29 RX ADMIN — Medication 1 TABLET(S): at 14:48

## 2022-04-29 RX ADMIN — CLOPIDOGREL BISULFATE 75 MILLIGRAM(S): 75 TABLET, FILM COATED ORAL at 14:47

## 2022-04-29 RX ADMIN — Medication 10: at 17:28

## 2022-04-29 RX ADMIN — Medication 1 TABLET(S): at 17:30

## 2022-04-29 RX ADMIN — Medication 0.1 MILLIGRAM(S): at 17:30

## 2022-04-29 RX ADMIN — Medication 667 MILLIGRAM(S): at 14:47

## 2022-04-29 RX ADMIN — Medication 8: at 12:46

## 2022-04-29 RX ADMIN — Medication 667 MILLIGRAM(S): at 17:29

## 2022-04-29 RX ADMIN — Medication 60 MILLIGRAM(S): at 04:49

## 2022-04-29 RX ADMIN — Medication 1: at 21:48

## 2022-04-29 RX ADMIN — ATORVASTATIN CALCIUM 40 MILLIGRAM(S): 80 TABLET, FILM COATED ORAL at 21:50

## 2022-04-29 RX ADMIN — HEPARIN SODIUM 5000 UNIT(S): 5000 INJECTION INTRAVENOUS; SUBCUTANEOUS at 05:00

## 2022-04-29 RX ADMIN — Medication 1 TABLET(S): at 05:01

## 2022-04-29 RX ADMIN — HEPARIN SODIUM 5000 UNIT(S): 5000 INJECTION INTRAVENOUS; SUBCUTANEOUS at 17:31

## 2022-04-29 RX ADMIN — Medication 50 MILLIGRAM(S): at 14:47

## 2022-04-29 RX ADMIN — TRAMADOL HYDROCHLORIDE 50 MILLIGRAM(S): 50 TABLET ORAL at 15:47

## 2022-04-29 RX ADMIN — Medication 60 MILLIGRAM(S): at 14:47

## 2022-04-29 RX ADMIN — INSULIN GLARGINE 12 UNIT(S): 100 INJECTION, SOLUTION SUBCUTANEOUS at 21:47

## 2022-04-29 RX ADMIN — Medication 0.1 MILLIGRAM(S): at 05:01

## 2022-04-29 RX ADMIN — PANTOPRAZOLE SODIUM 40 MILLIGRAM(S): 20 TABLET, DELAYED RELEASE ORAL at 05:02

## 2022-04-29 RX ADMIN — SENNA PLUS 2 TABLET(S): 8.6 TABLET ORAL at 21:50

## 2022-04-29 RX ADMIN — Medication 1 APPLICATION(S): at 14:48

## 2022-04-29 RX ADMIN — Medication 667 MILLIGRAM(S): at 08:14

## 2022-04-29 RX ADMIN — Medication 60 MILLIGRAM(S): at 21:50

## 2022-04-29 RX ADMIN — Medication 81 MILLIGRAM(S): at 14:46

## 2022-04-29 RX ADMIN — Medication 100 MILLIGRAM(S): at 19:02

## 2022-04-29 RX ADMIN — Medication 2: at 08:13

## 2022-04-29 RX ADMIN — TRAMADOL HYDROCHLORIDE 50 MILLIGRAM(S): 50 TABLET ORAL at 14:47

## 2022-04-29 NOTE — PROGRESS NOTE ADULT - SUBJECTIVE AND OBJECTIVE BOX
71y year old Female seen at bedside for right foot gangrenous digits. Pt in no acute distress. Denies any fever, chills, nausea, vomiting, chest pain, shortness of breath, or calf pain at this time.    Allergies    latex (Unknown)  No Known Drug Allergies    Intolerances        MEDICATIONS  (STANDING):  aspirin enteric coated 81 milliGRAM(s) Oral daily  atorvastatin 40 milliGRAM(s) Oral at bedtime  calcium acetate 667 milliGRAM(s) Oral three times a day with meals  ceFAZolin   IVPB 1000 milliGRAM(s) IV Intermittent every 24 hours  cloNIDine 0.1 milliGRAM(s) Oral every 12 hours  clopidogrel Tablet 75 milliGRAM(s) Oral daily  dextrose 5%. 1000 milliLiter(s) (100 mL/Hr) IV Continuous <Continuous>  dextrose 5%. 1000 milliLiter(s) (50 mL/Hr) IV Continuous <Continuous>  dextrose 50% Injectable 25 Gram(s) IV Push once  dextrose 50% Injectable 12.5 Gram(s) IV Push once  dextrose 50% Injectable 25 Gram(s) IV Push once  diltiazem    Tablet 60 milliGRAM(s) Oral every 8 hours  epoetin fawad-epbx (RETACRIT) Injectable 93566 Unit(s) IV Push <User Schedule>  glucagon  Injectable 1 milliGRAM(s) IntraMuscular once  glucagon  Injectable 1 milliGRAM(s) IntraMuscular once  heparin   Injectable 5000 Unit(s) SubCutaneous every 12 hours  imipramine 50 milliGRAM(s) Oral daily  insulin glargine Injectable (LANTUS) 10 Unit(s) SubCutaneous at bedtime  insulin lispro (ADMELOG) corrective regimen sliding scale   SubCutaneous three times a day before meals  insulin lispro (ADMELOG) corrective regimen sliding scale   SubCutaneous at bedtime  lactobacillus acidophilus 1 Tablet(s) Oral two times a day  metoprolol tartrate 100 milliGRAM(s) Oral every 12 hours  multivitamin 1 Tablet(s) Oral daily  pantoprazole    Tablet 40 milliGRAM(s) Oral before breakfast  senna 2 Tablet(s) Oral at bedtime    MEDICATIONS  (PRN):  acetaminophen     Tablet .. 650 milliGRAM(s) Oral every 6 hours PRN Temp greater or equal to 38C (100.4F), Mild Pain (1 - 3)  aluminum hydroxide/magnesium hydroxide/simethicone Suspension 30 milliLiter(s) Oral every 4 hours PRN Dyspepsia  dextrose Oral Gel 15 Gram(s) Oral once PRN Blood Glucose LESS THAN 70 milliGRAM(s)/deciliter  melatonin 3 milliGRAM(s) Oral at bedtime PRN Insomnia  ondansetron Injectable 4 milliGRAM(s) IV Push every 8 hours PRN Nausea and/or Vomiting  traMADol 50 milliGRAM(s) Oral three times a day PRN Moderate Pain (4 - 6)      PHYSICAL EXAM:  Vascular: DP & PT non-palpable bilaterally, Capillary refill 3 seconds, Digital hair absent bilaterally. Skin cold to touch, right foot  Neurological: Light touch sensation intact bilaterally  Musculoskeletal: POP to the right hallux, improved. 5/5 strength in all quadrants bilaterally, AJ & STJ ROM intact  Dermatological: Right hallux gangrene ulceration noted, right 2nd digit gangrenous changes, no open wounds, no probe to bone, no periwound erythema, no fluctuance, no malodor, no proximal streaking at this time      Culture - Blood (collected 22 Apr 2022 18:38)  Source: .Blood Blood-Peripheral  Preliminary Report (23 Apr 2022 19:02):    No growth to date.    Culture - Blood (collected 22 Apr 2022 18:38)  Source: .Blood Blood-Peripheral  Preliminary Report (23 Apr 2022 19:02):    No growth to date.    CBC Full  -  ( 29 Apr 2022 08:48 )  WBC Count : 11.91 K/uL  RBC Count : 3.11 M/uL  Hemoglobin : 9.7 g/dL  Hematocrit : 31.2 %  Platelet Count - Automated : 335 K/uL  Mean Cell Volume : 100.3 fl  Mean Cell Hemoglobin : 31.2 pg  Mean Cell Hemoglobin Concentration : 31.1 gm/dL  Auto Neutrophil # : x  Auto Lymphocyte # : x  Auto Monocyte # : x  Auto Eosinophil # : x  Auto Basophil # : x  Auto Neutrophil % : x  Auto Lymphocyte % : x  Auto Monocyte % : x  Auto Eosinophil % : x  Auto Basophil % : x    ICU Vital Signs Last 24 Hrs  T(C): 36.7 (29 Apr 2022 13:10), Max: 37.2 (28 Apr 2022 20:04)  T(F): 98.1 (29 Apr 2022 13:10), Max: 99 (28 Apr 2022 20:04)  HR: 68 (29 Apr 2022 13:10) (67 - 87)  BP: 148/66 (29 Apr 2022 13:10) (148/66 - 182/49)  RR: 18 (29 Apr 2022 13:10) (18 - 18)  SpO2: 88% (29 Apr 2022 13:10) (88% - 96%)

## 2022-04-29 NOTE — DISCHARGE NOTE PROVIDER - NSDCFUADDAPPT_GEN_ALL_CORE_FT
-continue cefazolin 1g IV q24h while inpatient, when ready for discharge can switch back to 2g-2g-3g with HD until May 10  -recommend weekly CBC with diff, CMP upon discharge  -wound care  -no ID contraindication to discharge  -[facility] to finishing antibiotic course

## 2022-04-29 NOTE — PROGRESS NOTE ADULT - SUBJECTIVE AND OBJECTIVE BOX
Date/Time Patient Seen:  		  Referring MD:   Data Reviewed	       Patient is a 71y old  Female who presents with a chief complaint of falls ,weakness (28 Apr 2022 11:50)      Subjective/HPI     PAST MEDICAL & SURGICAL HISTORY:  Diabetes mellitus II    HTN (hypertension)    h/o Anxiety attack    Depression    h/o Myocardial infarct 2007    CAD (coronary artery disease)    CAD (coronary artery disease)    h/o Hepatitis A 1969  currently resolved, no symptoms    PAD (peripheral artery disease)    Murmur, cardiac    h/o Smoking  quitted 3/2012    CRF (chronic renal failure), unspecified stage    Dialysis patient    Anemia secondary to renal failure    HTN (hypertension)    coronary stent 2007    s/p Ovarian cyst removal    s/p surgical removal of benign Skin lesion epigastric area          Medication list         MEDICATIONS  (STANDING):  aspirin enteric coated 81 milliGRAM(s) Oral daily  atorvastatin 40 milliGRAM(s) Oral at bedtime  calcium acetate 667 milliGRAM(s) Oral three times a day with meals  ceFAZolin   IVPB 1000 milliGRAM(s) IV Intermittent every 24 hours  cloNIDine 0.1 milliGRAM(s) Oral every 12 hours  clopidogrel Tablet 75 milliGRAM(s) Oral daily  dextrose 5%. 1000 milliLiter(s) (100 mL/Hr) IV Continuous <Continuous>  dextrose 5%. 1000 milliLiter(s) (50 mL/Hr) IV Continuous <Continuous>  dextrose 50% Injectable 25 Gram(s) IV Push once  dextrose 50% Injectable 12.5 Gram(s) IV Push once  dextrose 50% Injectable 25 Gram(s) IV Push once  diltiazem    Tablet 60 milliGRAM(s) Oral every 8 hours  epoetin fawad-epbx (RETACRIT) Injectable 01307 Unit(s) IV Push <User Schedule>  glucagon  Injectable 1 milliGRAM(s) IntraMuscular once  glucagon  Injectable 1 milliGRAM(s) IntraMuscular once  heparin   Injectable 5000 Unit(s) SubCutaneous every 12 hours  imipramine 50 milliGRAM(s) Oral daily  insulin glargine Injectable (LANTUS) 10 Unit(s) SubCutaneous at bedtime  insulin lispro (ADMELOG) corrective regimen sliding scale   SubCutaneous three times a day before meals  insulin lispro (ADMELOG) corrective regimen sliding scale   SubCutaneous at bedtime  lactobacillus acidophilus 1 Tablet(s) Oral two times a day  metoprolol tartrate 100 milliGRAM(s) Oral every 12 hours  multivitamin 1 Tablet(s) Oral daily  pantoprazole    Tablet 40 milliGRAM(s) Oral before breakfast  povidone iodine 10% Solution 1 Application(s) Topical daily  senna 2 Tablet(s) Oral at bedtime    MEDICATIONS  (PRN):  acetaminophen     Tablet .. 650 milliGRAM(s) Oral every 6 hours PRN Temp greater or equal to 38C (100.4F), Mild Pain (1 - 3)  aluminum hydroxide/magnesium hydroxide/simethicone Suspension 30 milliLiter(s) Oral every 4 hours PRN Dyspepsia  dextrose Oral Gel 15 Gram(s) Oral once PRN Blood Glucose LESS THAN 70 milliGRAM(s)/deciliter  melatonin 3 milliGRAM(s) Oral at bedtime PRN Insomnia  ondansetron Injectable 4 milliGRAM(s) IV Push every 8 hours PRN Nausea and/or Vomiting  traMADol 50 milliGRAM(s) Oral three times a day PRN Moderate Pain (4 - 6)         Vitals log        ICU Vital Signs Last 24 Hrs  T(C): 37.2 (28 Apr 2022 20:04), Max: 37.2 (28 Apr 2022 20:04)  T(F): 99 (28 Apr 2022 20:04), Max: 99 (28 Apr 2022 20:04)  HR: 83 (28 Apr 2022 20:04) (64 - 87)  BP: 161/70 (28 Apr 2022 20:04) (142/61 - 182/49)  BP(mean): --  ABP: --  ABP(mean): --  RR: 18 (28 Apr 2022 20:04) (17 - 18)  SpO2: 93% (28 Apr 2022 20:04) (93% - 96%)           Input and Output:  I&O's Detail    28 Apr 2022 07:01  -  29 Apr 2022 05:16  --------------------------------------------------------  IN:  Total IN: 0 mL    OUT:    Other (mL): 1000 mL  Total OUT: 1000 mL    Total NET: -1000 mL          Lab Data                        9.6    11.47 )-----------( 383      ( 28 Apr 2022 08:06 )             30.0     04-28    132<L>  |  96  |  54<H>  ----------------------------<  142<H>  5.1   |  29  |  5.70<H>    Ca    9.7      28 Apr 2022 08:06    TPro  6.8  /  Alb  2.6<L>  /  TBili  0.3  /  DBili  <0.1  /  AST  24  /  ALT  <6<L>  /  AlkPhos  90  04-28            Review of Systems	      Objective     Physical Examination    heart s1s2  lung dec BS  abd soft  head nc      Pertinent Lab findings & Imaging      Dexter:  NO   Adequate UO     I&O's Detail    28 Apr 2022 07:01  -  29 Apr 2022 05:16  --------------------------------------------------------  IN:  Total IN: 0 mL    OUT:    Other (mL): 1000 mL  Total OUT: 1000 mL    Total NET: -1000 mL               Discussed with:     Cultures:	        Radiology

## 2022-04-29 NOTE — DISCHARGE NOTE PROVIDER - PROVIDER TOKENS
PROVIDER:[TOKEN:[[id number]],FOLLOWUP:[1 week]],PROVIDER:[TOKEN:[[id number]],FOLLOWUP:[1 week]],PROVIDER:[TOKEN:[[id number]],FOLLOWUP:[2 weeks]],PROVIDER:[TOKEN:[[id number]],FOLLOWUP:[1 week]]

## 2022-04-29 NOTE — PROGRESS NOTE ADULT - SUBJECTIVE AND OBJECTIVE BOX
Patient is a 71y Female whom presented to the hospital with esrd on hd    PAST MEDICAL & SURGICAL HISTORY:  Diabetes mellitus II    HTN (hypertension)    h/o Anxiety attack    Depression    h/o Myocardial infarct 2007    CAD (coronary artery disease)    h/o Hepatitis A 1969  currently resolved, no symptoms    PAD (peripheral artery disease)    Murmur, cardiac    h/o Smoking  quitted 3/2012    CRF (chronic renal failure), unspecified stage    Dialysis patient    Anemia secondary to renal failure    HTN (hypertension)    coronary stent 2007    s/p Ovarian cyst removal    s/p surgical removal of benign Skin lesion epigastric area        MEDICATIONS  (STANDING):  aspirin enteric coated 81 milliGRAM(s) Oral daily  atorvastatin 40 milliGRAM(s) Oral at bedtime  calcium acetate 667 milliGRAM(s) Oral three times a day with meals  ceFAZolin   IVPB 1000 milliGRAM(s) IV Intermittent every 24 hours  cloNIDine 0.1 milliGRAM(s) Oral every 12 hours  clopidogrel Tablet 75 milliGRAM(s) Oral daily  dextrose 5%. 1000 milliLiter(s) (100 mL/Hr) IV Continuous <Continuous>  dextrose 5%. 1000 milliLiter(s) (50 mL/Hr) IV Continuous <Continuous>  dextrose 50% Injectable 25 Gram(s) IV Push once  dextrose 50% Injectable 12.5 Gram(s) IV Push once  dextrose 50% Injectable 25 Gram(s) IV Push once  diltiazem    Tablet 60 milliGRAM(s) Oral every 8 hours  glucagon  Injectable 1 milliGRAM(s) IntraMuscular once  heparin   Injectable 5000 Unit(s) SubCutaneous every 12 hours  imipramine 50 milliGRAM(s) Oral daily  insulin lispro (ADMELOG) corrective regimen sliding scale   SubCutaneous three times a day before meals  insulin lispro (ADMELOG) corrective regimen sliding scale   SubCutaneous at bedtime  lactobacillus acidophilus 1 Tablet(s) Oral two times a day  metoprolol tartrate 100 milliGRAM(s) Oral every 12 hours  multivitamin 1 Tablet(s) Oral daily  pantoprazole    Tablet 40 milliGRAM(s) Oral before breakfast  senna 2 Tablet(s) Oral at bedtime      Allergies    latex (Unknown)  No Known Drug Allergies    Intolerances        SOCIAL HISTORY:  Denies ETOh,Smoking,     FAMILY HISTORY:      REVIEW OF SYSTEMS:    CONSTITUTIONAL: No weakness, fevers or chills  RESPIRATORY: No cough, wheezing, hemoptysis; No shortness of breath  CARDIOVASCULAR: No chest pain or palpitations  GASTROINTESTINAL: No abdominal or epigastric pain. No nausea, vomiting,                               9.7    11.91 )-----------( 335      ( 29 Apr 2022 08:48 )             31.2       CBC Full  -  ( 29 Apr 2022 08:48 )  WBC Count : 11.91 K/uL  RBC Count : 3.11 M/uL  Hemoglobin : 9.7 g/dL  Hematocrit : 31.2 %  Platelet Count - Automated : 335 K/uL  Mean Cell Volume : 100.3 fl  Mean Cell Hemoglobin : 31.2 pg  Mean Cell Hemoglobin Concentration : 31.1 gm/dL  Auto Neutrophil # : x  Auto Lymphocyte # : x  Auto Monocyte # : x  Auto Eosinophil # : x  Auto Basophil # : x  Auto Neutrophil % : x  Auto Lymphocyte % : x  Auto Monocyte % : x  Auto Eosinophil % : x  Auto Basophil % : x      04-29    133<L>  |  97  |  33<H>  ----------------------------<  210<H>  4.5   |  28  |  3.90<H>    Ca    9.6      29 Apr 2022 08:48    TPro  6.8  /  Alb  2.6<L>  /  TBili  0.3  /  DBili  <0.1  /  AST  24  /  ALT  <6<L>  /  AlkPhos  90  04-28      CAPILLARY BLOOD GLUCOSE      POCT Blood Glucose.: 369 mg/dL (29 Apr 2022 16:33)  POCT Blood Glucose.: 347 mg/dL (29 Apr 2022 11:59)  POCT Blood Glucose.: 195 mg/dL (29 Apr 2022 07:33)  POCT Blood Glucose.: 247 mg/dL (28 Apr 2022 21:00)      Vital Signs Last 24 Hrs  T(C): 36.7 (29 Apr 2022 13:10), Max: 37.2 (28 Apr 2022 20:04)  T(F): 98.1 (29 Apr 2022 13:10), Max: 99 (28 Apr 2022 20:04)  HR: 68 (29 Apr 2022 13:10) (67 - 87)  BP: 148/66 (29 Apr 2022 13:10) (148/66 - 182/49)  BP(mean): --  RR: 18 (29 Apr 2022 13:10) (18 - 18)  SpO2: 88% (29 Apr 2022 13:10) (88% - 96%)                                                        PHYSICAL EXAM:    Constitutional: NAD  HEENT: conjunctive   clear   Neck:  No JVD  Respiratory: CTAB  Cardiovascular: S1 and S2  Gastrointestinal: BS+, soft, NT/ND  Extremities: No peripheral edema  Neurological: A/O x 3, no focal deficits

## 2022-04-29 NOTE — PROGRESS NOTE ADULT - SUBJECTIVE AND OBJECTIVE BOX
71y year old Female seen at [facility] 1EAS 113 D1 for right foot gangrenous digits. Pt in no acute distress. Denies any fever, chills, nausea, vomiting, chest pain, shortness of breath, or calf pain at this time.    Allergies    latex (Unknown)  No Known Drug Allergies    Intolerances        MEDICATIONS  (STANDING):  aspirin enteric coated 81 milliGRAM(s) Oral daily  atorvastatin 40 milliGRAM(s) Oral at bedtime  calcium acetate 667 milliGRAM(s) Oral three times a day with meals  ceFAZolin   IVPB 1000 milliGRAM(s) IV Intermittent every 24 hours  cloNIDine 0.1 milliGRAM(s) Oral every 12 hours  clopidogrel Tablet 75 milliGRAM(s) Oral daily  dextrose 5%. 1000 milliLiter(s) (100 mL/Hr) IV Continuous <Continuous>  dextrose 5%. 1000 milliLiter(s) (50 mL/Hr) IV Continuous <Continuous>  dextrose 50% Injectable 25 Gram(s) IV Push once  dextrose 50% Injectable 12.5 Gram(s) IV Push once  dextrose 50% Injectable 25 Gram(s) IV Push once  diltiazem    Tablet 60 milliGRAM(s) Oral every 8 hours  epoetin fawad-epbx (RETACRIT) Injectable 56913 Unit(s) IV Push <User Schedule>  glucagon  Injectable 1 milliGRAM(s) IntraMuscular once  glucagon  Injectable 1 milliGRAM(s) IntraMuscular once  heparin   Injectable 5000 Unit(s) SubCutaneous every 12 hours  imipramine 50 milliGRAM(s) Oral daily  insulin glargine Injectable (LANTUS) 10 Unit(s) SubCutaneous at bedtime  insulin lispro (ADMELOG) corrective regimen sliding scale   SubCutaneous three times a day before meals  insulin lispro (ADMELOG) corrective regimen sliding scale   SubCutaneous at bedtime  lactobacillus acidophilus 1 Tablet(s) Oral two times a day  metoprolol tartrate 100 milliGRAM(s) Oral every 12 hours  multivitamin 1 Tablet(s) Oral daily  pantoprazole    Tablet 40 milliGRAM(s) Oral before breakfast  senna 2 Tablet(s) Oral at bedtime    MEDICATIONS  (PRN):  acetaminophen     Tablet .. 650 milliGRAM(s) Oral every 6 hours PRN Temp greater or equal to 38C (100.4F), Mild Pain (1 - 3)  aluminum hydroxide/magnesium hydroxide/simethicone Suspension 30 milliLiter(s) Oral every 4 hours PRN Dyspepsia  dextrose Oral Gel 15 Gram(s) Oral once PRN Blood Glucose LESS THAN 70 milliGRAM(s)/deciliter  melatonin 3 milliGRAM(s) Oral at bedtime PRN Insomnia  ondansetron Injectable 4 milliGRAM(s) IV Push every 8 hours PRN Nausea and/or Vomiting  traMADol 50 milliGRAM(s) Oral three times a day PRN Moderate Pain (4 - 6)      ICU Vital Signs Last 24 Hrs  T(C): 36.7 (29 Apr 2022 13:10), Max: 37.2 (28 Apr 2022 20:04)  T(F): 98.1 (29 Apr 2022 13:10), Max: 99 (28 Apr 2022 20:04)  HR: 68 (29 Apr 2022 13:10) (67 - 87)  BP: 148/66 (29 Apr 2022 13:10) (148/66 - 182/49)  BP(mean): --  ABP: --  ABP(mean): --  RR: 18 (29 Apr 2022 13:10) (17 - 18)  SpO2: 96% (29 Apr 2022 04:50) (93% - 96%)  CBC Full  -  ( 29 Apr 2022 08:48 )  WBC Count : 11.91 K/uL  RBC Count : 3.11 M/uL  Hemoglobin : 9.7 g/dL  Hematocrit : 31.2 %  Platelet Count - Automated : 335 K/uL  Mean Cell Volume : 100.3 fl  Mean Cell Hemoglobin : 31.2 pg  Mean Cell Hemoglobin Concentration : 31.1 gm/dL  Auto Neutrophil # : x  Auto Lymphocyte # : x  Auto Monocyte # : x  Auto Eosinophil # : x  Auto Basophil # : x  Auto Neutrophil % : x  Auto Lymphocyte % : x  Auto Monocyte % : x  Auto Eosinophil % : x  Auto Basophil % : x      PHYSICAL EXAM:  Vascular: DP & PT non-palpable bilaterally, Capillary refill 3 seconds, Digital hair absent bilaterally. Skin cold to touch, right foot  Neurological: Light touch sensation intact bilaterally  Musculoskeletal: POP to the right hallux, improved. 5/5 strength in all quadrants bilaterally, AJ & STJ ROM intact  Dermatological: Right hallux gangrene ulceration noted, right 2nd digit gangrenous changes, no open wounds, no probe to bone, no periwound erythema, no fluctuance, no malodor, no proximal streaking at this time      Culture - Blood (collected 22 Apr 2022 18:38)  Source: .Blood Blood-Peripheral  Preliminary Report (23 Apr 2022 19:02):    No growth to date.    Culture - Blood (collected 22 Apr 2022 18:38)  Source: .Blood Blood-Peripheral  Preliminary Report (23 Apr 2022 19:02):    No growth to date.

## 2022-04-29 NOTE — DISCHARGE NOTE PROVIDER - NSDCCPCAREPLAN_GEN_ALL_CORE_FT
PRINCIPAL DISCHARGE DIAGNOSIS  Diagnosis: Frequent falls  Assessment and Plan of Treatment:       SECONDARY DISCHARGE DIAGNOSES  Diagnosis: DM (diabetes mellitus)  Assessment and Plan of Treatment:     Diagnosis: ESRD on dialysis  Assessment and Plan of Treatment:     Diagnosis: PAD (peripheral artery disease)  Assessment and Plan of Treatment:     Diagnosis: Acute osteomyelitis  Assessment and Plan of Treatment:     Diagnosis: Atrial fibrillation  Assessment and Plan of Treatment:     Diagnosis: Anemia secondary to renal failure  Assessment and Plan of Treatment:     Diagnosis: Gangrenous toe  Assessment and Plan of Treatment:     Diagnosis: Non-compliant patient  Assessment and Plan of Treatment:

## 2022-04-29 NOTE — PROGRESS NOTE ADULT - SUBJECTIVE AND OBJECTIVE BOX
Patient is a 71y Female with a known history of :  Falls [W19.XXXA]    DM (diabetes mellitus) [E11.9]    Acute osteomyelitis [M86.10]    ESRD on dialysis [N18.6]    Anemia secondary to renal failure [N18.9]    Atrial fibrillation [I48.91]    PAD (peripheral artery disease) [I73.9]    Non-compliant patient [Z91.19]    Prophylactic measure [Z29.9]    Need for follow-up by  [Z78.9]    Gangrenous toe [I96]    Chronic osteomyelitis [M86.60]      HPI:  Patient came to ED because she  fell today on her way to dialysis, pt states that she is falling everyday and always feels weak. pt poor historian, missed dialysis today, will need admission for placement. Recent admission 04/05/22 -70yo female bib ems with sob, pt states she has missed the last 2 rounds of dialysis and is due today, and has been having worsening sob, no cough, fever, chills, no chest pain no other complaints .Found to have hyperkalemia Diagnosed with foot infection MRI showed evidence of acute osteomyelitis of L medial malleolus and distal aspect of R 1st toe ,poa . -ID recommended  cefazolin 2g-2g-3g with HD x 4 weeks .Patient was discharged home with home care and iv abx at HD center Palliative care consult requested ,to discuss advance directives and complete MOLST  (22 Apr 2022 16:22)      REVIEW OF SYSTEMS:    CONSTITUTIONAL: No fever, weight loss, or fatigue  EYES: No eye pain, visual disturbances, or discharge  ENMT:  No difficulty hearing, tinnitus, vertigo; No sinus or throat pain  NECK: No pain or stiffness  BREASTS: No pain, masses, or nipple discharge  RESPIRATORY: No cough, wheezing, chills or hemoptysis; No shortness of breath  CARDIOVASCULAR: No chest pain, palpitations, dizziness, or leg swelling  GASTROINTESTINAL: No abdominal or epigastric pain. No nausea, vomiting, or hematemesis; No diarrhea or constipation. No melena or hematochezia.  GENITOURINARY: No dysuria, frequency, hematuria, or incontinence  NEUROLOGICAL: No headaches, memory loss, loss of strength, numbness, or tremors  SKIN: No itching, burning, rashes, or lesions   LYMPH NODES: No enlarged glands  ENDOCRINE: No heat or cold intolerance; No hair loss  MUSCULOSKELETAL: No joint pain or swelling; No muscle, back, or extremity pain  PSYCHIATRIC: No depression, anxiety, mood swings, or difficulty sleeping  HEME/LYMPH: No easy bruising, or bleeding gums  ALLERGY AND IMMUNOLOGIC: No hives or eczema    MEDICATIONS  (STANDING):  aspirin enteric coated 81 milliGRAM(s) Oral daily  atorvastatin 40 milliGRAM(s) Oral at bedtime  calcium acetate 667 milliGRAM(s) Oral three times a day with meals  ceFAZolin   IVPB 1000 milliGRAM(s) IV Intermittent every 24 hours  cloNIDine 0.1 milliGRAM(s) Oral every 12 hours  clopidogrel Tablet 75 milliGRAM(s) Oral daily  dextrose 5%. 1000 milliLiter(s) (100 mL/Hr) IV Continuous <Continuous>  dextrose 5%. 1000 milliLiter(s) (50 mL/Hr) IV Continuous <Continuous>  dextrose 50% Injectable 25 Gram(s) IV Push once  dextrose 50% Injectable 12.5 Gram(s) IV Push once  dextrose 50% Injectable 25 Gram(s) IV Push once  diltiazem    Tablet 60 milliGRAM(s) Oral every 8 hours  epoetin fawad-epbx (RETACRIT) Injectable 05075 Unit(s) IV Push <User Schedule>  glucagon  Injectable 1 milliGRAM(s) IntraMuscular once  glucagon  Injectable 1 milliGRAM(s) IntraMuscular once  heparin   Injectable 5000 Unit(s) SubCutaneous every 12 hours  imipramine 50 milliGRAM(s) Oral daily  insulin glargine Injectable (LANTUS) 10 Unit(s) SubCutaneous at bedtime  insulin lispro (ADMELOG) corrective regimen sliding scale   SubCutaneous three times a day before meals  insulin lispro (ADMELOG) corrective regimen sliding scale   SubCutaneous at bedtime  lactobacillus acidophilus 1 Tablet(s) Oral two times a day  metoprolol tartrate 100 milliGRAM(s) Oral every 12 hours  multivitamin 1 Tablet(s) Oral daily  pantoprazole    Tablet 40 milliGRAM(s) Oral before breakfast  povidone iodine 10% Solution 1 Application(s) Topical daily  senna 2 Tablet(s) Oral at bedtime    MEDICATIONS  (PRN):  acetaminophen     Tablet .. 650 milliGRAM(s) Oral every 6 hours PRN Temp greater or equal to 38C (100.4F), Mild Pain (1 - 3)  aluminum hydroxide/magnesium hydroxide/simethicone Suspension 30 milliLiter(s) Oral every 4 hours PRN Dyspepsia  dextrose Oral Gel 15 Gram(s) Oral once PRN Blood Glucose LESS THAN 70 milliGRAM(s)/deciliter  melatonin 3 milliGRAM(s) Oral at bedtime PRN Insomnia  ondansetron Injectable 4 milliGRAM(s) IV Push every 8 hours PRN Nausea and/or Vomiting  traMADol 50 milliGRAM(s) Oral three times a day PRN Moderate Pain (4 - 6)      ALLERGIES: latex (Unknown)  No Known Drug Allergies      FAMILY HISTORY:      PHYSICAL EXAMINATION:  -----------------------------  T(C): 36.7 (04-29-22 @ 04:50), Max: 37.2 (04-28-22 @ 20:04)  HR: 67 (04-29-22 @ 04:50) (64 - 87)  BP: 156/58 (04-29-22 @ 04:50) (142/61 - 182/49)  RR: 18 (04-29-22 @ 04:50) (17 - 18)  SpO2: 96% (04-29-22 @ 04:50) (93% - 96%)  Wt(kg): --    04-28 @ 07:01  -  04-29 @ 07:00  --------------------------------------------------------  IN:    Oral Fluid: 240 mL  Total IN: 240 mL    OUT:    Other (mL): 1000 mL  Total OUT: 1000 mL    Total NET: -760 mL            VITALS  T(C): 36.7 (04-29-22 @ 04:50), Max: 37.2 (04-28-22 @ 20:04)  HR: 67 (04-29-22 @ 04:50) (64 - 87)  BP: 156/58 (04-29-22 @ 04:50) (142/61 - 182/49)  RR: 18 (04-29-22 @ 04:50) (17 - 18)  SpO2: 96% (04-29-22 @ 04:50) (93% - 96%)    Constitutional: well developed, normal appearance, well groomed, well nourished, no deformities and no acute distress.   Eyes: the conjunctiva exhibited no abnormalities and the eyelids demonstrated no xanthelasmas.   HEENT: normal oral mucosa, no oral pallor and no oral cyanosis.   Neck: normal jugular venous A waves present, normal jugular venous V waves present and no jugular venous wilson A waves.   Pulmonary: no respiratory distress, normal respiratory rhythm and effort, no accessory muscle use and lungs were clear to auscultation bilaterally.   Cardiovascular: heart rate and rhythm were normal, normal S1 and S2 and no murmur, gallop, rub, heave or thrill are present.   Abdomen: soft, non-tender, no hepato-splenomegaly and no abdominal mass palpated.   Musculoskeletal: the gait could not be assessed..   Extremities: no clubbing of the fingernails, no localized cyanosis, no petechial hemorrhages and no ischemic changes.   Skin: normal skin color and pigmentation, no rash, no venous stasis, no skin lesions, no skin ulcer and no xanthoma was observed.   Psychiatric: oriented to person, place, and time, the affect was normal, the mood was normal and not feeling anxious.     LABS:   --------  04-28    132<L>  |  96  |  54<H>  ----------------------------<  142<H>  5.1   |  29  |  5.70<H>    Ca    9.7      28 Apr 2022 08:06    TPro  6.8  /  Alb  2.6<L>  /  TBili  0.3  /  DBili  <0.1  /  AST  24  /  ALT  <6<L>  /  AlkPhos  90  04-28                         9.6    11.47 )-----------( 383      ( 28 Apr 2022 08:06 )             30.0                 RADIOLOGY:  -----------------    ECG:     ECHO:

## 2022-04-29 NOTE — DISCHARGE NOTE PROVIDER - CARE PROVIDERS DIRECT ADDRESSES
,DirectAddress_Unknown,DirectAddress_Unknown,DirectAddress_Unknown,[email or web address]

## 2022-04-29 NOTE — DISCHARGE NOTE PROVIDER - CARE PROVIDER_API CALL
Faisal Pastor (DPM)  Podiatric Medicine  [address] [place] [address]  Phone: [phone]  Fax: [phone]  Follow Up Time: 1 week    Lupe Tsai)  Infectious Disease; Internal Medicine  [address] [place] [address]  Phone: [phone]  Fax: [phone]  Follow Up Time: 1 week    Joseluis James)  Internal Medicine  [address], [address]  [place] [address]  Phone: [phone]  Fax: [phone]  Follow Up Time: 2 weeks    Pahlavan, Mohsen (MD)  Medicine  [address], [address]  [place] [address]  Phone: [phone]  Fax: [phone]  Follow Up Time: 1 week

## 2022-04-29 NOTE — DISCHARGE NOTE PROVIDER - NSDCMRMEDTOKEN_GEN_ALL_CORE_FT
acetaminophen 325 mg oral tablet: 2 tab(s) orally every 6 hours, As needed, Temp greater or equal to 38C (100.4F), Mild Pain (1 - 3)  apixaban 2.5 mg oral tablet: 1 tab(s) orally every 12 hours  aspirin 81 mg oral delayed release tablet: 1 tab(s) orally once a day OTC   atorvastatin 40 mg oral tablet: 1 tab(s) orally once a day (at bedtime)  home/hosp  calcium acetate 667 mg oral tablet: 1 tab(s) orally 3 times a day  ceFAZolin: 3 times a week ,Patient will require abx with HD sessions - cefazolin 2g-2g-3g with HD x 4 weeks  - weekly CBC with diff, CMP, ESR and CRP  cloNIDine 0.1 mg oral tablet: 1 tab(s) orally 2 times a day  clopidogrel 75 mg oral tablet: 1 tab(s) orally once a day  home/hosp  dilTIAZem 60 mg oral tablet: 1 tab(s) orally every 8 hours ,HOLD FOR SBP EBLOW 100 OR HR BELOW 60  Emla 2.5%-2.5% topical cream: Apply topically to affected area once on Dialysis days  epoetin fawad: 67535 unit(s) intravenous 3 times a week with HD SESSION as per nephrologist orders   Fiasp 100 units/mL injectable solution: 3-5 unit(s) injectable 3 times a day (with meals) as per sliding scale RESUME PREADMISSION COVERAGE   imipramine 50 mg oral tablet: 1 tab(s) orally once a day  home/hosp  lactobacillus acidophilus oral tablet: 2 tab(s) orally once a day  melatonin 3 mg oral tablet: 1 tab(s) orally once a day (at bedtime), As needed, Insomnia OTC   metoprolol tartrate 100 mg oral tablet: 1 tab(s) orally every 12 hours ,HOLD FOR SBP BELOW 100 OR HR BELOW 60  Protonix 40 mg oral delayed release tablet: 1 tab(s) orally once a day  hosp  Jennifer-Alfie oral tablet: 1 tab(s) orally once a day  traMADol 50 mg oral tablet: 1 tab(s) orally 3 times a day, As needed, Moderate Pain (4 - 6) MDD:3  Tresiba FlexTouch 100 units/mL subcutaneous solution: 15 unit(s) subcutaneous once a day (at bedtime) RESUME PREADMISSION COVERAGE    acetaminophen 325 mg oral tablet: 2 tab(s) orally every 6 hours, As needed, Temp greater or equal to 38C (100.4F), Mild Pain (1 - 3)  aspirin 81 mg oral delayed release tablet: 1 tab(s) orally once a day OTC   atorvastatin 40 mg oral tablet: 1 tab(s) orally once a day (at bedtime)  home/hosp  calcium acetate 667 mg oral tablet: 1 tab(s) orally 3 times a day  ceFAZolin: 3 times a week ,Patient will require abx with HD sessions - cefazolin 2g-2g-3g with HD till MAY 10 th 05/10/22  - weekly CBC with diff, CMP, ESR and CRP  cloNIDine 0.1 mg oral tablet: 1 tab(s) orally 2 times a day  clopidogrel 75 mg oral tablet: 1 tab(s) orally once a day  home/hosp  dilTIAZem 60 mg oral tablet: 1 tab(s) orally every 8 hours ,HOLD FOR SBP EBLOW 100 OR HR BELOW 60  epoetin fawad: 85758 unit(s) intravenous 3 times a week with HD SESSION as per nephrologist orders   Fiasp 100 units/mL injectable solution: 3-5 unit(s) injectable 3 times a day (with meals) as per sliding scale   heparin: 5000 unit(s) subcutaneous 2 times a day  imipramine 50 mg oral tablet: 1 tab(s) orally once a day  home/hosp  lactobacillus acidophilus oral tablet: 2 tab(s) orally once a day  melatonin 3 mg oral tablet: 1 tab(s) orally once a day (at bedtime), As needed, Insomnia OTC   metoprolol tartrate 100 mg oral tablet: 1 tab(s) orally every 12 hours ,HOLD FOR SBP BELOW 100 OR HR BELOW 60  povidone iodine 10% topical solution: 1 application topically once a day  Protonix 40 mg oral delayed release tablet: 1 tab(s) orally once a day  hosp  Jennifer-Alfie oral tablet: 1 tab(s) orally once a day  senna oral tablet: 2 tab(s) orally once a day (at bedtime)  traMADol 50 mg oral tablet: 1 tab(s) orally 3 times a day, As needed, Moderate Pain (4 - 6) MDD:3  Tresiba FlexTouch 100 units/mL subcutaneous solution: 15 unit(s) subcutaneous once a day (at bedtime) RESUME PREADMISSION COVERAGE    acetaminophen 325 mg oral tablet: 2 tab(s) orally every 6 hours, As needed, Temp greater or equal to 38C (100.4F), Mild Pain (1 - 3)  aspirin 81 mg oral delayed release tablet: 1 tab(s) orally once a day OTC   atorvastatin 40 mg oral tablet: 1 tab(s) orally once a day (at bedtime)  home/hosp  calcium acetate 667 mg oral tablet: 1 tab(s) orally 3 times a day  ceFAZolin: 3 times a week ,Patient will require abx with HD sessions - cefazolin 2g-2g-3g with HD till MAY 10 th 05/10/22  - weekly CBC with diff, CMP, ESR and CRP  cloNIDine 0.1 mg oral tablet: 1 tab(s) orally 2 times a day  clopidogrel 75 mg oral tablet: 1 tab(s) orally once a day  home/hosp  dilTIAZem 60 mg oral tablet: 1 tab(s) orally every 8 hours ,HOLD FOR SBP EBLOW 100 OR HR BELOW 60  epoetin fawad: 03617 unit(s) intravenous 3 times a week with HD SESSION as per nephrologist orders   heparin: 5000 unit(s) subcutaneous 2 times a day  imipramine 50 mg oral tablet: 1 tab(s) orally once a day  home/hosp  insulin glargine 100 units/mL subcutaneous solution: 12 unit(s) subcutaneous once a day (at bedtime)  insulin lispro (concentrated) 200 units/mL subcutaneous solution: subcutaneous 3 times a day (before meals) SLIDING SCALE   lactobacillus acidophilus oral tablet: 2 tab(s) orally once a day  melatonin 3 mg oral tablet: 1 tab(s) orally once a day (at bedtime), As needed, Insomnia OTC   metoprolol tartrate 100 mg oral tablet: 1 tab(s) orally every 12 hours ,HOLD FOR SBP BELOW 100 OR HR BELOW 60  povidone iodine 10% topical solution: 1 application topically once a day  Protonix 40 mg oral delayed release tablet: 1 tab(s) orally once a day  hosp  Jennifer-Alfie oral tablet: 1 tab(s) orally once a day  senna oral tablet: 2 tab(s) orally once a day (at bedtime)  traMADol 50 mg oral tablet: 1 tab(s) orally 3 times a day, As needed, Moderate Pain (4 - 6) MDD:3

## 2022-04-29 NOTE — PROGRESS NOTE ADULT - SUBJECTIVE AND OBJECTIVE BOX
Neurology follow up note    SHILO [id number]      Interval History:    Patient feels ok no new complaints.    Allergies    latex (Unknown)  No Known Drug Allergies    Intolerances        MEDICATIONS    acetaminophen     Tablet .. 650 milliGRAM(s) Oral every 6 hours PRN  aluminum hydroxide/magnesium hydroxide/simethicone Suspension 30 milliLiter(s) Oral every 4 hours PRN  aspirin enteric coated 81 milliGRAM(s) Oral daily  atorvastatin 40 milliGRAM(s) Oral at bedtime  calcium acetate 667 milliGRAM(s) Oral three times a day with meals  ceFAZolin   IVPB 1000 milliGRAM(s) IV Intermittent every 24 hours  cloNIDine 0.1 milliGRAM(s) Oral every 12 hours  clopidogrel Tablet 75 milliGRAM(s) Oral daily  dextrose 5%. 1000 milliLiter(s) IV Continuous <Continuous>  dextrose 5%. 1000 milliLiter(s) IV Continuous <Continuous>  dextrose 50% Injectable 25 Gram(s) IV Push once  dextrose 50% Injectable 12.5 Gram(s) IV Push once  dextrose 50% Injectable 25 Gram(s) IV Push once  dextrose Oral Gel 15 Gram(s) Oral once PRN  diltiazem    Tablet 60 milliGRAM(s) Oral every 8 hours  epoetin fawad-epbx (RETACRIT) Injectable 25374 Unit(s) IV Push <User Schedule>  glucagon  Injectable 1 milliGRAM(s) IntraMuscular once  glucagon  Injectable 1 milliGRAM(s) IntraMuscular once  heparin   Injectable 5000 Unit(s) SubCutaneous every 12 hours  imipramine 50 milliGRAM(s) Oral daily  insulin glargine Injectable (LANTUS) 10 Unit(s) SubCutaneous at bedtime  insulin lispro (ADMELOG) corrective regimen sliding scale   SubCutaneous three times a day before meals  insulin lispro (ADMELOG) corrective regimen sliding scale   SubCutaneous at bedtime  lactobacillus acidophilus 1 Tablet(s) Oral two times a day  melatonin 3 milliGRAM(s) Oral at bedtime PRN  metoprolol tartrate 100 milliGRAM(s) Oral every 12 hours  multivitamin 1 Tablet(s) Oral daily  ondansetron Injectable 4 milliGRAM(s) IV Push every 8 hours PRN  pantoprazole    Tablet 40 milliGRAM(s) Oral before breakfast  povidone iodine 10% Solution 1 Application(s) Topical daily  senna 2 Tablet(s) Oral at bedtime  traMADol 50 milliGRAM(s) Oral three times a day PRN              Vital Signs Last 24 Hrs  T(C): 36.7 (29 Apr 2022 04:50), Max: 37.2 (28 Apr 2022 20:04)  T(F): 98.1 (29 Apr 2022 04:50), Max: 99 (28 Apr 2022 20:04)  HR: 67 (29 Apr 2022 04:50) (64 - 87)  BP: 156/58 (29 Apr 2022 04:50) (142/61 - 182/49)  BP(mean): --  RR: 18 (29 Apr 2022 04:50) (17 - 18)  SpO2: 96% (29 Apr 2022 04:50) (93% - 96%)      REVIEW OF SYSTEMS:  Constitutional:  No fever, chills, or night sweats.  Head:  No headaches.  Eyes:  No double vision or blurry vision.  Ears: No ringing in the ears.  Neck:  No neck pain.  Cardiovascular:  No chest pain.  Respiratory:  No shortness of breath.  Abdomen:  No nausea, vomiting, or abdominal pain.  Extremities/Neurological:  Positive numbness and tingling in bilateral feet.  Spine:  No history of back pain.    PHYSICAL EXAMINATION:    HEENT:  Head:  Normocephalic, atraumatic.  Eyes:  No scleral icterus.  Ears:  Hearing bilaterally intact.  NECK:  Supple.  RESPIRATORY:  Good air entry bilaterally.  CARDIOVASCULAR:  S1 and S2 heard.  ABDOMEN:  Soft, nontender.  EXTREMITIES:  No clubbing or cyanosis was noted.  Positive sign of scars and blood on the bottom of her feet from scrapes.      NEUROLOGIC:  The patient is awake and alert.  Extraocular movements were intact.  Upon conversing with the patient, at times, she may be slightly forgetful, but as per my conversation with the spouse, he has noticed that lately as well.  Speech was fluent.  Smile was symmetric.  Full visual fields.  Motor:  Bilateral upper 5/5.  Bilateral lower 4/5.  The patient had markedly impaired proprioception in bilateral lower extremities.  Knee jerks bilaterally were trace, suspect this could be secondary to diabetes and possibly peripheral neuropathy.                 LABS:  CBC Full  -  ( 28 Apr 2022 08:06 )  WBC Count : 11.47 K/uL  RBC Count : 3.00 M/uL  Hemoglobin : 9.6 g/dL  Hematocrit : 30.0 %  Platelet Count - Automated : 383 K/uL  Mean Cell Volume : 100.0 fl  Mean Cell Hemoglobin : 32.0 pg  Mean Cell Hemoglobin Concentration : 32.0 gm/dL  Auto Neutrophil # : 8.26 K/uL  Auto Lymphocyte # : 1.03 K/uL  Auto Monocyte # : 1.72 K/uL  Auto Eosinophil # : 0.46 K/uL  Auto Basophil # : 0.00 K/uL  Auto Neutrophil % : 71.0 %  Auto Lymphocyte % : 9.0 %  Auto Monocyte % : 15.0 %  Auto Eosinophil % : 4.0 %  Auto Basophil % : 0.0 %      04-28    132<L>  |  96  |  54<H>  ----------------------------<  142<H>  5.1   |  29  |  5.70<H>    Ca    9.7      28 Apr 2022 08:06    TPro  6.8  /  Alb  2.6<L>  /  TBili  0.3  /  DBili  <0.1  /  AST  24  /  ALT  <6<L>  /  AlkPhos  90  04-28    Hemoglobin A1C:     LIVER FUNCTIONS - ( 28 Apr 2022 12:02 )  Alb: 2.6 g/dL / Pro: 6.8 g/dL / ALK PHOS: 90 U/L / ALT: <6 U/L / AST: 24 U/L / GGT: x           Vitamin B12         RADIOLOGY      ANALYSIS AND PLAN:  This is a 71-year-old with a history of difficulty ambulating, ataxia, and falls.  For ataxia and falls, suspect this is multifactorial secondary to diabetes and end-stage kidney disease and deconditioning, age-related changes leading to peripheral neuropathy type of component as well.  Suspect less likely this is a primary central nervous system event secondary to as per the patient and her spouse both legs become weak.  This points toward more a peripheral type of etiology.  Would recommend physical therapy evaluation.  For history of diabetes, strict control of blood sugars.  For history of hypertension, monitor systolic blood pressure.  For hyperlipidemia, continue the patient on statin.  The patient appears to be on multiple blood thinning medications, would recommend risks versus benefits must be weighed in regards to the patient's history of falls.  For possibly mild cognitive impairment  ESRD nephology follow up   do to high risk of fall medical team noted AC dc on antiplatelets     Spoke with spouse, Geoffrey, at [phone] 4/24 today and the patient in great detail.  Patient now stating she does not want to go to rehab she wants to she how she does with physical therapy here     Greater than 17 minutes of time was spent with the patient, plan of care, reviewing data, with greater than 50% of the visit was spent counseling and/or coordinating care with multidisciplinary healthcare team   Neurology follow up note    SHILO [id number]      Interval History:    Patient feels ok no new complaints.    Allergies    latex (Unknown)  No Known Drug Allergies    Intolerances        MEDICATIONS    acetaminophen     Tablet .. 650 milliGRAM(s) Oral every 6 hours PRN  aluminum hydroxide/magnesium hydroxide/simethicone Suspension 30 milliLiter(s) Oral every 4 hours PRN  aspirin enteric coated 81 milliGRAM(s) Oral daily  atorvastatin 40 milliGRAM(s) Oral at bedtime  calcium acetate 667 milliGRAM(s) Oral three times a day with meals  ceFAZolin   IVPB 1000 milliGRAM(s) IV Intermittent every 24 hours  cloNIDine 0.1 milliGRAM(s) Oral every 12 hours  clopidogrel Tablet 75 milliGRAM(s) Oral daily  dextrose 5%. 1000 milliLiter(s) IV Continuous <Continuous>  dextrose 5%. 1000 milliLiter(s) IV Continuous <Continuous>  dextrose 50% Injectable 25 Gram(s) IV Push once  dextrose 50% Injectable 12.5 Gram(s) IV Push once  dextrose 50% Injectable 25 Gram(s) IV Push once  dextrose Oral Gel 15 Gram(s) Oral once PRN  diltiazem    Tablet 60 milliGRAM(s) Oral every 8 hours  epoetin fawad-epbx (RETACRIT) Injectable 65376 Unit(s) IV Push <User Schedule>  glucagon  Injectable 1 milliGRAM(s) IntraMuscular once  glucagon  Injectable 1 milliGRAM(s) IntraMuscular once  heparin   Injectable 5000 Unit(s) SubCutaneous every 12 hours  imipramine 50 milliGRAM(s) Oral daily  insulin glargine Injectable (LANTUS) 10 Unit(s) SubCutaneous at bedtime  insulin lispro (ADMELOG) corrective regimen sliding scale   SubCutaneous three times a day before meals  insulin lispro (ADMELOG) corrective regimen sliding scale   SubCutaneous at bedtime  lactobacillus acidophilus 1 Tablet(s) Oral two times a day  melatonin 3 milliGRAM(s) Oral at bedtime PRN  metoprolol tartrate 100 milliGRAM(s) Oral every 12 hours  multivitamin 1 Tablet(s) Oral daily  ondansetron Injectable 4 milliGRAM(s) IV Push every 8 hours PRN  pantoprazole    Tablet 40 milliGRAM(s) Oral before breakfast  povidone iodine 10% Solution 1 Application(s) Topical daily  senna 2 Tablet(s) Oral at bedtime  traMADol 50 milliGRAM(s) Oral three times a day PRN              Vital Signs Last 24 Hrs  T(C): 36.7 (29 Apr 2022 04:50), Max: 37.2 (28 Apr 2022 20:04)  T(F): 98.1 (29 Apr 2022 04:50), Max: 99 (28 Apr 2022 20:04)  HR: 67 (29 Apr 2022 04:50) (64 - 87)  BP: 156/58 (29 Apr 2022 04:50) (142/61 - 182/49)  BP(mean): --  RR: 18 (29 Apr 2022 04:50) (17 - 18)  SpO2: 96% (29 Apr 2022 04:50) (93% - 96%)      REVIEW OF SYSTEMS:  Constitutional:  No fever, chills, or night sweats.  Head:  No headaches.  Eyes:  No double vision or blurry vision.  Ears: No ringing in the ears.  Neck:  No neck pain.  Cardiovascular:  No chest pain.  Respiratory:  No shortness of breath.  Abdomen:  No nausea, vomiting, or abdominal pain.  Extremities/Neurological:  Positive numbness and tingling in bilateral feet.  Spine:  No history of back pain.    PHYSICAL EXAMINATION:    HEENT:  Head:  Normocephalic, atraumatic.  Eyes:  No scleral icterus.  Ears:  Hearing bilaterally intact.  NECK:  Supple.  RESPIRATORY:  Good air entry bilaterally.  CARDIOVASCULAR:  S1 and S2 heard.  ABDOMEN:  Soft, nontender.  EXTREMITIES:  No clubbing or cyanosis was noted.  Positive sign of scars and blood on the bottom of her feet from scrapes.      NEUROLOGIC:  The patient is awake and alert.  Extraocular movements were intact.  Upon conversing with the patient, at times, she may be slightly forgetful, but as per my conversation with the spouse, he has noticed that lately as well.  Speech was fluent.  Smile was symmetric.  Full visual fields.  Motor:  Bilateral upper 5/5.  Bilateral lower 4/5.  The patient had markedly impaired proprioception in bilateral lower extremities.  Knee jerks bilaterally were trace, suspect this could be secondary to diabetes and possibly peripheral neuropathy.                 LABS:  CBC Full  -  ( 28 Apr 2022 08:06 )  WBC Count : 11.47 K/uL  RBC Count : 3.00 M/uL  Hemoglobin : 9.6 g/dL  Hematocrit : 30.0 %  Platelet Count - Automated : 383 K/uL  Mean Cell Volume : 100.0 fl  Mean Cell Hemoglobin : 32.0 pg  Mean Cell Hemoglobin Concentration : 32.0 gm/dL  Auto Neutrophil # : 8.26 K/uL  Auto Lymphocyte # : 1.03 K/uL  Auto Monocyte # : 1.72 K/uL  Auto Eosinophil # : 0.46 K/uL  Auto Basophil # : 0.00 K/uL  Auto Neutrophil % : 71.0 %  Auto Lymphocyte % : 9.0 %  Auto Monocyte % : 15.0 %  Auto Eosinophil % : 4.0 %  Auto Basophil % : 0.0 %      04-28    132<L>  |  96  |  54<H>  ----------------------------<  142<H>  5.1   |  29  |  5.70<H>    Ca    9.7      28 Apr 2022 08:06    TPro  6.8  /  Alb  2.6<L>  /  TBili  0.3  /  DBili  <0.1  /  AST  24  /  ALT  <6<L>  /  AlkPhos  90  04-28    Hemoglobin A1C:     LIVER FUNCTIONS - ( 28 Apr 2022 12:02 )  Alb: 2.6 g/dL / Pro: 6.8 g/dL / ALK PHOS: 90 U/L / ALT: <6 U/L / AST: 24 U/L / GGT: x           Vitamin B12         RADIOLOGY      ANALYSIS AND PLAN:  This is a 71-year-old with a history of difficulty ambulating, ataxia, and falls.  For ataxia and falls, suspect this is multifactorial secondary to diabetes and end-stage kidney disease and deconditioning, age-related changes leading to peripheral neuropathy type of component as well.  Suspect less likely this is a primary central nervous system event secondary to as per the patient and her spouse both legs become weak.  This points toward more a peripheral type of etiology.  Would recommend physical therapy evaluation.  For history of diabetes, strict control of blood sugars.  For history of hypertension, monitor systolic blood pressure.  For hyperlipidemia, continue the patient on statin.  The patient appears to be on multiple blood thinning medications, would recommend risks versus benefits must be weighed in regards to the patient's history of falls.  For possibly mild cognitive impairment  ESRD nephology follow up   do to high risk of fall medical team noted AC dc on antiplatelets   neurologic wise stable dc planning     Spoke with spouse, Geoffrey, at [phone] 4/24 today and the patient in great detail.  Patient now stating she does  want to go to rehab     Greater than 15 minutes of time was spent with the patient, plan of care, reviewing data, with greater than 50% of the visit was spent counseling and/or coordinating care with multidisciplinary healthcare team

## 2022-04-29 NOTE — DISCHARGE NOTE PROVIDER - HOSPITAL COURSE
Patient came to ED because she  fell today on her way to dialysis, pt states that she is falling everyday and always feels weak. pt poor historian, missed dialysis today, will need admission for placement. Recent admission 04/05/22 -70yo female bib ems with sob, pt states she has missed the last 2 rounds of dialysis and is due today, and has been having worsening sob, no cough, fever, chills, no chest pain no other complaints .Found to have hyperkalemia Diagnosed with foot infection MRI showed evidence of acute osteomyelitis of L medial malleolus and distal aspect of R 1st toe ,poa . -ID recommended  cefazolin 2g-2g-3g with HD x 4 weeks .Patient was discharged home with home care and iv abx at HD center Palliative care consult requested ,to discuss advance directives and complete MOLST     Nutritional Assessment:  · Nutritional Assessment  This patient has been assessed with a concern for Malnutrition and has been determined to have a diagnosis/diagnoses of Severe protein-calorie malnutrition.    This patient is being managed with:   Diet Consistent Carbohydrate Renal w/Evening Snack-  Easy to Chew (EASYTOCHEW)  Abel(7 Gm Arginine/7 Gm Glut/1.2 Gm HMB     Qty per Day:  2  Supplement Feeding Modality:  Oral  Nepro Cans or Servings Per Day:  1       Frequency:  Two Times a day  Entered: Apr 23 2022 12:02PM    Problem/Plan - 1:  ·  Problem: Falls.   ·  Plan: fall precautions ,PT/OT ,[facility]AN placement.    Problem/Plan - 2:  ·  Problem: Acute osteomyelitis.   ·  Plan: completed course of tx ,podiatry eval.    Problem/Plan - 3:  ·  Problem: DM (diabetes mellitus).   ·  Plan: Accuchecks monitoring and insulin corrective regimen  sliding scale coverage with short acting inslulin, add longacting insulin as needed ,no concentrated sweets diet, serial labs ,HbA1C,education.    Problem/Plan - 4:  ·  Problem: Atrial fibrillation.   ·  Plan: continue home medications ,OAC as per  .Due to frequent falls patient is poor candidate to continue previous 3 agents .D/w neurologist  and cardiologist .    Problem/Plan - 5:  ·  Problem: Anemia secondary to renal failure.   ·  Plan: serial cbc ,anemia workup noted , continue home medications.    Problem/Plan - 6:  ·  Problem: ESRD on dialysis.   ·  Plan: HD as per Dr Perez ,serial bmp.    Problem/Plan - 7:  ·  Problem: PAD (peripheral artery disease).   ·  Plan: continue home medications .Seen by  during recent admission and CTA reviewed. L Fem distal bypass patent. L EIA occl. Patient has had a recent LLE bypass and should follow up with her vascular surgeon. No vascular surgery intervention needed during previous  admission. She may need LLE intervention in the future. Continue wound care.    Problem/Plan - 8:  ·  Problem: Non-compliant patient.   ·  Plan: Frequent falls at home ,home situation seems to be not safe .patient lives with her elderly  and has history of noncompliance and missing dialysis . Admitted with falls and weakness .After improvement of symptoms and stabilization  patient will be required to be assessed   for rehabilitation . Social service input requested for ME[facility] placement . Patient is medically stable to be transferred to rehabilitation facility ,when the bed is available and arranged by social service .    Problem/Plan - 9:  ·  Problem: Need for follow-up by .   ·  Plan: Frequent falls at home ,home situation seems to be not safe .patient lives with her elderly  and has history of noncompliance and missing dialysis . Admitted with falls and weakness .After improvement of symptoms and stabilization  patient will be required to be assessed   for rehabilitation . Social service input requested for [facility] placement . Patient is medically stable to be transferred to rehabilitation facility ,when the bed is available and arranged by social service .Seen by PT-MEGHAN recommended   Patient is adamantly refusing rehab placement ,she was reminded that she is admitted with frequent falls but she is still stating that " will be helping at home ".Discussed with  on 1 e and sw/cm  input regarding safety of home situation requested .PCP Dr Obando is aware of all above .    Problem/Plan - 10:  ·  Problem: Prophylactic measure.   ·  Plan; Gastrointestinal stress ulcer prophylaxis and DVT prophylaxis administered.

## 2022-04-29 NOTE — PROGRESS NOTE ADULT - SUBJECTIVE AND OBJECTIVE BOX
PROGRESS NOTE  Patient is a 71y old  Female who presents with a chief complaint of falls ,weakness (29 Apr 2022 13:39)  Chart and available morning labs /imaging are reviewed electronically , urgent issues addressed . More information  is being added upon completion of rounds , when more information is collected and management discussed with consultants , medical staff and social service/case management on the floor   OVERNIGHT  No new issues reported by medical staff . All above noted Patient is resting in a bed comfortably . .No distress noted   Awaiting for [facility] placement   HPI:  Patient came to ED because she  fell today on her way to dialysis, pt states that she is falling everyday and always feels weak. pt poor historian, missed dialysis today, will need admission for placement. Recent admission 04/05/22 -72yo female bib ems with sob, pt states she has missed the last 2 rounds of dialysis and is due today, and has been having worsening sob, no cough, fever, chills, no chest pain no other complaints .Found to have hyperkalemia Diagnosed with foot infection MRI showed evidence of acute osteomyelitis of L medial malleolus and distal aspect of R 1st toe ,poa . -ID recommended  cefazolin 2g-2g-3g with HD x 4 weeks .Patient was discharged home with home care and iv abx at HD center Palliative care consult requested ,to discuss advance directives and complete MOLST  (22 Apr 2022 16:22)    PAST MEDICAL & SURGICAL HISTORY:  Diabetes mellitus II    HTN (hypertension)    h/o Anxiety attack    Depression    h/o Myocardial infarct 2007    CAD (coronary artery disease)    h/o Hepatitis A 1969  currently resolved, no symptoms    PAD (peripheral artery disease)    Murmur, cardiac    h/o Smoking  quitted 3/2012    CRF (chronic renal failure), unspecified stage    Dialysis patient    HTN (hypertension)    Anemia secondary to renal failure    coronary stent 2007    s/p Ovarian cyst removal    s/p surgical removal of benign Skin lesion epigastric area        MEDICATIONS  (STANDING):  aspirin enteric coated 81 milliGRAM(s) Oral daily  atorvastatin 40 milliGRAM(s) Oral at bedtime  calcium acetate 667 milliGRAM(s) Oral three times a day with meals  ceFAZolin   IVPB 1000 milliGRAM(s) IV Intermittent every 24 hours  cloNIDine 0.1 milliGRAM(s) Oral every 12 hours  clopidogrel Tablet 75 milliGRAM(s) Oral daily  dextrose 5%. 1000 milliLiter(s) (100 mL/Hr) IV Continuous <Continuous>  dextrose 5%. 1000 milliLiter(s) (50 mL/Hr) IV Continuous <Continuous>  dextrose 50% Injectable 25 Gram(s) IV Push once  dextrose 50% Injectable 12.5 Gram(s) IV Push once  dextrose 50% Injectable 25 Gram(s) IV Push once  diltiazem    Tablet 60 milliGRAM(s) Oral every 8 hours  epoetin fawad-epbx (RETACRIT) Injectable 01380 Unit(s) IV Push <User Schedule>  glucagon  Injectable 1 milliGRAM(s) IntraMuscular once  glucagon  Injectable 1 milliGRAM(s) IntraMuscular once  heparin   Injectable 5000 Unit(s) SubCutaneous every 12 hours  imipramine 50 milliGRAM(s) Oral daily  insulin glargine Injectable (LANTUS) 10 Unit(s) SubCutaneous at bedtime  insulin lispro (ADMELOG) corrective regimen sliding scale   SubCutaneous three times a day before meals  insulin lispro (ADMELOG) corrective regimen sliding scale   SubCutaneous at bedtime  lactobacillus acidophilus 1 Tablet(s) Oral two times a day  metoprolol tartrate 100 milliGRAM(s) Oral every 12 hours  multivitamin 1 Tablet(s) Oral daily  pantoprazole    Tablet 40 milliGRAM(s) Oral before breakfast  povidone iodine 10% Solution 1 Application(s) Topical daily  senna 2 Tablet(s) Oral at bedtime    MEDICATIONS  (PRN):  acetaminophen     Tablet .. 650 milliGRAM(s) Oral every 6 hours PRN Temp greater or equal to 38C (100.4F), Mild Pain (1 - 3)  aluminum hydroxide/magnesium hydroxide/simethicone Suspension 30 milliLiter(s) Oral every 4 hours PRN Dyspepsia  dextrose Oral Gel 15 Gram(s) Oral once PRN Blood Glucose LESS THAN 70 milliGRAM(s)/deciliter  melatonin 3 milliGRAM(s) Oral at bedtime PRN Insomnia  ondansetron Injectable 4 milliGRAM(s) IV Push every 8 hours PRN Nausea and/or Vomiting  traMADol 50 milliGRAM(s) Oral three times a day PRN Moderate Pain (4 - 6)      OBJECTIVE    T(C): 36.7 (04-29-22 @ 13:10), Max: 37.2 (04-28-22 @ 20:04)  HR: 68 (04-29-22 @ 13:10) (67 - 87)  BP: 148/66 (04-29-22 @ 13:10) (148/66 - 182/49)  RR: 18 (04-29-22 @ 13:10) (17 - 18)  SpO2: 88% (04-29-22 @ 13:10) (88% - 96%)  Wt(kg): --  I&O's Summary    28 Apr 2022 07:01  -  29 Apr 2022 07:00  --------------------------------------------------------  IN: 240 mL / OUT: 1000 mL / NET: -760 mL          REVIEW OF SYSTEMS:  CONSTITUTIONAL: No fever, weight loss, or fatigue  A 10-point review of systems was obtained.   Review of systems otherwise unchanged compared to prior visit except as previously noted   PHYSICAL EXAM:  Appearance: NAD. VS past 24 hrs -as above   HEENT:   Moist oral mucosa. Conjunctiva clear b/l.   Neck : supple  Respiratory: Lungs CTAB.  Gastrointestinal:  Soft, nontender. No rebound. No rigidity. BS present	  Cardiovascular: RRR ,S1S2 present  Neurologic: Non-focal. Moving all extremities.  Extremities: No edema. No erythema. No calf tenderness.  Skin: No rashes, No ecchymoses, No cyanosis.	  wounds ,skin lesions-See skin assesment flow sheet   LABS:                        9.7    11.91 )-----------( 335      ( 29 Apr 2022 08:48 )             31.2     04-29    133<L>  |  97  |  33<H>  ----------------------------<  210<H>  4.5   |  28  |  3.90<H>    Ca    9.6      29 Apr 2022 08:48    TPro  6.8  /  Alb  2.6<L>  /  TBili  0.3  /  DBili  <0.1  /  AST  24  /  ALT  <6<L>  /  AlkPhos  90  04-28    CAPILLARY BLOOD GLUCOSE      POCT Blood Glucose.: 347 mg/dL (29 Apr 2022 11:59)  POCT Blood Glucose.: 195 mg/dL (29 Apr 2022 07:33)  POCT Blood Glucose.: 247 mg/dL (28 Apr 2022 21:00)  POCT Blood Glucose.: 223 mg/dL (28 Apr 2022 16:53)          Culture - Blood (collected 22 Apr 2022 18:38)  Source: .Blood Blood-Peripheral  Final Report (27 Apr 2022 19:01):    No Growth Final    Culture - Blood (collected 22 Apr 2022 18:38)  Source: .Blood Blood-Peripheral  Final Report (27 Apr 2022 19:01):    No Growth Final      RADIOLOGY & ADDITIONAL TESTS:   reviewed elctronically  ASSESSMENT/PLAN: 	    25 minutes aggregate time was spent on this visit, 50% visit time spent in care co-ordination with other attendings and counselling patient .I have discussed care plan with patient / HCP/family member ,who expressed understanding of problems treatment and their effect and side effects, alternatives in details. I have asked if they have any questions and concerns and appropriately addressed them to best of my ability. Patient was seen and examined on a day of discharge . Plan of care , discharge medications and recommendations discussed with consultants and clearance for discharge obtained .Social service , case management  and medical staff are aware of plan. Family is notified. Discharge summary  is  prepared electronically-see separate document prepared by me .75minutes spent on this visit, 50% visit time spent in care co-ordination with other attendings and counselling patient  I have discussed care plan with patient and HCP,expressed understanding of problems treatment and their effect and side effects, alternatives in detail,I have asked if they have any questions and concerns and appropriately addressed them to best of my ability

## 2022-04-29 NOTE — CHART NOTE - NSCHARTNOTEFT_GEN_A_CORE
Assessment:   Pt seen for nutrition follow up.  Chart reviewed, hospital course noted.     Brief History:   70yo Female with PMH of HTN, ESRD-HD, T2DM, MI, CAD, depression. Pt admitted on 4/22 s/p fall on way to HD missing session.    Factors impacting intake: [ ] none [ ] nausea  [ ] vomiting [ ] diarrhea [ ] constipation  [x]chewing problems [ ] swallowing issues  [x] other: picky eater    Diet Prescription: Diet, Consistent Carbohydrate Renal w/Evening Snack:   Easy to Chew (EASYTOCHEW)  Abel(7 Gm Arginine/7 Gm Glut/1.2 Gm HMB     Qty per Day:  2  Supplement Feeding Modality:  Oral  Nepro Cans or Servings Per Day:  1       Frequency:  Two Times a day (04-23-22 @ 12:02)    Intake: fair-  not taking Abel- doesn't like.  Endorses drinking Nepro shakes.     Current Weight: 4/22 133.3#, 4/28 134.9# post HD      Pertinent Medications: MEDICATIONS  (STANDING):  aspirin enteric coated 81 milliGRAM(s) Oral daily  atorvastatin 40 milliGRAM(s) Oral at bedtime  calcium acetate 667 milliGRAM(s) Oral three times a day with meals  ceFAZolin   IVPB 1000 milliGRAM(s) IV Intermittent every 24 hours  cloNIDine 0.1 milliGRAM(s) Oral every 12 hours  clopidogrel Tablet 75 milliGRAM(s) Oral daily  dextrose 5%. 1000 milliLiter(s) (100 mL/Hr) IV Continuous <Continuous>  dextrose 5%. 1000 milliLiter(s) (50 mL/Hr) IV Continuous <Continuous>  dextrose 50% Injectable 25 Gram(s) IV Push once  dextrose 50% Injectable 12.5 Gram(s) IV Push once  dextrose 50% Injectable 25 Gram(s) IV Push once  diltiazem    Tablet 60 milliGRAM(s) Oral every 8 hours  epoetin fawad-epbx (RETACRIT) Injectable 87266 Unit(s) IV Push <User Schedule>  glucagon  Injectable 1 milliGRAM(s) IntraMuscular once  glucagon  Injectable 1 milliGRAM(s) IntraMuscular once  heparin   Injectable 5000 Unit(s) SubCutaneous every 12 hours  imipramine 50 milliGRAM(s) Oral daily  insulin glargine Injectable (LANTUS) 10 Unit(s) SubCutaneous at bedtime  insulin lispro (ADMELOG) corrective regimen sliding scale   SubCutaneous three times a day before meals  insulin lispro (ADMELOG) corrective regimen sliding scale   SubCutaneous at bedtime  lactobacillus acidophilus 1 Tablet(s) Oral two times a day  metoprolol tartrate 100 milliGRAM(s) Oral every 12 hours  multivitamin 1 Tablet(s) Oral daily  pantoprazole    Tablet 40 milliGRAM(s) Oral before breakfast  povidone iodine 10% Solution 1 Application(s) Topical daily  senna 2 Tablet(s) Oral at bedtime    MEDICATIONS  (PRN):  acetaminophen     Tablet .. 650 milliGRAM(s) Oral every 6 hours PRN Temp greater or equal to 38C (100.4F), Mild Pain (1 - 3)  aluminum hydroxide/magnesium hydroxide/simethicone Suspension 30 milliLiter(s) Oral every 4 hours PRN Dyspepsia  dextrose Oral Gel 15 Gram(s) Oral once PRN Blood Glucose LESS THAN 70 milliGRAM(s)/deciliter  melatonin 3 milliGRAM(s) Oral at bedtime PRN Insomnia  ondansetron Injectable 4 milliGRAM(s) IV Push every 8 hours PRN Nausea and/or Vomiting  traMADol 50 milliGRAM(s) Oral three times a day PRN Moderate Pain (4 - 6)    Pertinent Labs: 04-29 Na133 mmol/L<L> Glu 210 mg/dL<H> K+ 4.5 mmol/L Cr  3.90 mg/dL<H> BUN 33 mg/dL<H> 04-28 Alb 2.6 g/dL<L>     CAPILLARY BLOOD GLUCOSE      POCT Blood Glucose.: 347 mg/dL (29 Apr 2022 11:59)  POCT Blood Glucose.: 195 mg/dL (29 Apr 2022 07:33)  POCT Blood Glucose.: 247 mg/dL (28 Apr 2022 21:00)  POCT Blood Glucose.: 223 mg/dL (28 Apr 2022 16:53)      Skin:  pressure injuries x 4, see flow sheets  Edema: none    Estimated Needs:   [x] no change since previous assessment on: 4/23 based on 134#  kcal/day: 2076-4153  gms protein/day:     Previous Nutrition Diagnosis:   [x] Increased Nutrient Needs (protein-energy, micronutrients)    [x]  Malnutrition     Nutrition Diagnosis is [x] ongoing  [ ] resolved [ ] not applicable     New Nutrition Diagnosis: [x] not applicable       Interventions:   Recommend  [x] Continue current diet  [ ] Change Diet To:  [x] Nutrition Supplement- d/c Abel due to pt refusal  [ ] Nutrition Support  [x} Other:  Vit C 500mg bid    Monitoring and Evaluation:   [x] PO intake [ x ] Tolerance to diet prescription [ x ] weights [ x ] labs [ x ] follow up per protocol  [x] other: s/s GI distress, bowel function, skin integrity/ edema

## 2022-04-30 RX ADMIN — Medication 1 TABLET(S): at 14:19

## 2022-04-30 RX ADMIN — Medication 1 TABLET(S): at 17:03

## 2022-04-30 RX ADMIN — Medication 667 MILLIGRAM(S): at 14:18

## 2022-04-30 RX ADMIN — Medication 1 TABLET(S): at 05:40

## 2022-04-30 RX ADMIN — Medication 100 MILLIGRAM(S): at 05:39

## 2022-04-30 RX ADMIN — Medication 650 MILLIGRAM(S): at 21:37

## 2022-04-30 RX ADMIN — Medication 3 MILLIGRAM(S): at 23:24

## 2022-04-30 RX ADMIN — Medication 100 MILLIGRAM(S): at 18:48

## 2022-04-30 RX ADMIN — Medication 0.1 MILLIGRAM(S): at 17:55

## 2022-04-30 RX ADMIN — Medication 81 MILLIGRAM(S): at 14:19

## 2022-04-30 RX ADMIN — Medication 60 MILLIGRAM(S): at 14:18

## 2022-04-30 RX ADMIN — Medication 50 MILLIGRAM(S): at 14:19

## 2022-04-30 RX ADMIN — Medication 1 APPLICATION(S): at 14:18

## 2022-04-30 RX ADMIN — Medication 6: at 17:03

## 2022-04-30 RX ADMIN — Medication 100 MILLIGRAM(S): at 17:55

## 2022-04-30 RX ADMIN — Medication 667 MILLIGRAM(S): at 17:04

## 2022-04-30 RX ADMIN — Medication 1: at 21:34

## 2022-04-30 RX ADMIN — Medication 4: at 08:24

## 2022-04-30 RX ADMIN — CLOPIDOGREL BISULFATE 75 MILLIGRAM(S): 75 TABLET, FILM COATED ORAL at 14:19

## 2022-04-30 RX ADMIN — PANTOPRAZOLE SODIUM 40 MILLIGRAM(S): 20 TABLET, DELAYED RELEASE ORAL at 05:41

## 2022-04-30 RX ADMIN — HEPARIN SODIUM 5000 UNIT(S): 5000 INJECTION INTRAVENOUS; SUBCUTANEOUS at 05:41

## 2022-04-30 RX ADMIN — Medication 667 MILLIGRAM(S): at 08:24

## 2022-04-30 RX ADMIN — ATORVASTATIN CALCIUM 40 MILLIGRAM(S): 80 TABLET, FILM COATED ORAL at 21:40

## 2022-04-30 RX ADMIN — INSULIN GLARGINE 12 UNIT(S): 100 INJECTION, SOLUTION SUBCUTANEOUS at 21:35

## 2022-04-30 RX ADMIN — Medication 650 MILLIGRAM(S): at 23:21

## 2022-04-30 RX ADMIN — Medication 60 MILLIGRAM(S): at 05:40

## 2022-04-30 RX ADMIN — HEPARIN SODIUM 5000 UNIT(S): 5000 INJECTION INTRAVENOUS; SUBCUTANEOUS at 17:03

## 2022-04-30 RX ADMIN — Medication 60 MILLIGRAM(S): at 21:36

## 2022-04-30 RX ADMIN — Medication 0.1 MILLIGRAM(S): at 05:41

## 2022-04-30 RX ADMIN — ERYTHROPOIETIN 10000 UNIT(S): 10000 INJECTION, SOLUTION INTRAVENOUS; SUBCUTANEOUS at 10:02

## 2022-04-30 NOTE — PROGRESS NOTE ADULT - SUBJECTIVE AND OBJECTIVE BOX
"  Problem: General Rehab Plan of Care  Goal: Therapeutic Recreation/Music Therapy Goal  Description  The patient and/or their representative will achieve their patient-specific goals related to the plan of care.  The patient-specific goals include:    While in Therapeutic Recreation and Music Therapy structured groups, intervention to focus on decreasing symptoms of depression, elimination of suicide ideation, and elevation of mood through enjoyable recreational/art or music experiences. Additional interventions to focus on stress management and healthy coping options related to leisure participation.    1. Patient will identify an increase in mood prior to discharge.  2. Patient will identify two coping options related to recreation, art and or music that can be used as alternative to self harm.        Attended full hour of music therapy group.  Intervention focused on improving socialization, mood, and relaxation. Pt checked in as feeling \"a low 2 out of 10.\" She stated \"I want to feel like a 6 when I discharge.\" She actively participated in name that tune, and had a bright affect when playing the [place]. Pt stated \"I think I am leaving Thursday.\" Cooperative and pleasant.   Outcome: Improving     "   Patient is a 71y Female whom presented to the hospital with esrd on hd    PAST MEDICAL & SURGICAL HISTORY:  Diabetes mellitus II    HTN (hypertension)    h/o Anxiety attack    Depression    h/o Myocardial infarct 2007    CAD (coronary artery disease)    h/o Hepatitis A 1969  currently resolved, no symptoms    PAD (peripheral artery disease)    Murmur, cardiac    h/o Smoking  quitted 3/2012    CRF (chronic renal failure), unspecified stage    Dialysis patient    Anemia secondary to renal failure    HTN (hypertension)    coronary stent 2007    s/p Ovarian cyst removal    s/p surgical removal of benign Skin lesion epigastric area        MEDICATIONS  (STANDING):  aspirin enteric coated 81 milliGRAM(s) Oral daily  atorvastatin 40 milliGRAM(s) Oral at bedtime  calcium acetate 667 milliGRAM(s) Oral three times a day with meals  ceFAZolin   IVPB 1000 milliGRAM(s) IV Intermittent every 24 hours  cloNIDine 0.1 milliGRAM(s) Oral every 12 hours  clopidogrel Tablet 75 milliGRAM(s) Oral daily  dextrose 5%. 1000 milliLiter(s) (100 mL/Hr) IV Continuous <Continuous>  dextrose 5%. 1000 milliLiter(s) (50 mL/Hr) IV Continuous <Continuous>  dextrose 50% Injectable 25 Gram(s) IV Push once  dextrose 50% Injectable 12.5 Gram(s) IV Push once  dextrose 50% Injectable 25 Gram(s) IV Push once  diltiazem    Tablet 60 milliGRAM(s) Oral every 8 hours  glucagon  Injectable 1 milliGRAM(s) IntraMuscular once  heparin   Injectable 5000 Unit(s) SubCutaneous every 12 hours  imipramine 50 milliGRAM(s) Oral daily  insulin lispro (ADMELOG) corrective regimen sliding scale   SubCutaneous three times a day before meals  insulin lispro (ADMELOG) corrective regimen sliding scale   SubCutaneous at bedtime  lactobacillus acidophilus 1 Tablet(s) Oral two times a day  metoprolol tartrate 100 milliGRAM(s) Oral every 12 hours  multivitamin 1 Tablet(s) Oral daily  pantoprazole    Tablet 40 milliGRAM(s) Oral before breakfast  senna 2 Tablet(s) Oral at bedtime      Allergies    latex (Unknown)  No Known Drug Allergies    Intolerances        SOCIAL HISTORY:  Denies ETOh,Smoking,     FAMILY HISTORY:      REVIEW OF SYSTEMS:    CONSTITUTIONAL: No weakness, fevers or chills  RESPIRATORY: No cough, wheezing, hemoptysis; No shortness of breath  CARDIOVASCULAR: No chest pain or palpitations  GASTROINTESTINAL: No abdominal or epigastric pain. No nausea, vomiting,                             9.7    11.91 )-----------( 335      ( 29 Apr 2022 08:48 )             31.2       CBC Full  -  ( 29 Apr 2022 08:48 )  WBC Count : 11.91 K/uL  RBC Count : 3.11 M/uL  Hemoglobin : 9.7 g/dL  Hematocrit : 31.2 %  Platelet Count - Automated : 335 K/uL  Mean Cell Volume : 100.3 fl  Mean Cell Hemoglobin : 31.2 pg  Mean Cell Hemoglobin Concentration : 31.1 gm/dL  Auto Neutrophil # : x  Auto Lymphocyte # : x  Auto Monocyte # : x  Auto Eosinophil # : x  Auto Basophil # : x  Auto Neutrophil % : x  Auto Lymphocyte % : x  Auto Monocyte % : x  Auto Eosinophil % : x  Auto Basophil % : x      04-29    133<L>  |  97  |  33<H>  ----------------------------<  210<H>  4.5   |  28  |  3.90<H>    Ca    9.6      29 Apr 2022 08:48    TPro  6.8  /  Alb  2.6<L>  /  TBili  0.3  /  DBili  <0.1  /  AST  24  /  ALT  <6<L>  /  AlkPhos  90  04-28      CAPILLARY BLOOD GLUCOSE      POCT Blood Glucose.: 216 mg/dL (30 Apr 2022 08:12)  POCT Blood Glucose.: 264 mg/dL (29 Apr 2022 21:05)  POCT Blood Glucose.: 369 mg/dL (29 Apr 2022 16:33)  POCT Blood Glucose.: 347 mg/dL (29 Apr 2022 11:59)      Vital Signs Last 24 Hrs  T(C): 36.3 (30 Apr 2022 09:35), Max: 37.1 (29 Apr 2022 20:06)  T(F): 97.4 (30 Apr 2022 09:35), Max: 98.8 (29 Apr 2022 20:06)  HR: 58 (30 Apr 2022 09:40) (58 - 70)  BP: 128/56 (30 Apr 2022 09:40) (128/56 - 163/69)  BP(mean): --  RR: 18 (30 Apr 2022 09:35) (17 - 18)  SpO2: 96% (30 Apr 2022 09:35) (88% - 96%)                                 PHYSICAL EXAM:    Constitutional: NAD  HEENT: conjunctive   clear   Neck:  No JVD  Respiratory: CTAB  Cardiovascular: S1 and S2  Gastrointestinal: BS+, soft, NT/ND  Extremities: No peripheral edema  Neurological: A/O x 3, no focal deficits

## 2022-04-30 NOTE — PROGRESS NOTE ADULT - PROBLEM SELECTOR PLAN 9
Frequent falls at home ,home situation seems to be not safe .patient lives with her elderly  and has history of noncompliance and missing dialysis . Admitted with falls and weakness .After improvement of symptoms and stabilization  patient will be required to be assessed   for rehabilitation . Social service input requested for [facility] placement . Patient is medically stable to be transferred to rehabilitation facility ,when the bed is available and arranged by social service .Seen by PT-[facility] recommended   Patient is adamantly refusing rehab placement ,she was reminded that she is admitted with frequent falls but she is still stating that " will be helping at home ".Discussed with  on 1 e and sw/cm  input regarding safety of home situation requested .PCP Dr Obando is aware of all above .
Frequent falls at home ,home situation seems to be not safe .patient lives with her elderly  and has history of noncompliance and missing dialysis . Admitted with falls and weakness .After improvement of symptoms and stabilization  patient will be required to be assessed   for rehabilitation . Social service input requested for [facility] placement . Patient is medically stable to be transferred to rehabilitation facility ,when the bed is available and arranged by social service .Seen by PT-[facility] recommended   Patient is adamantly refusing rehab placement ,she was reminded that she is admitted with frequent falls but she is still stating that " will be helping at home ".Discussed with  on 1 e and sw/cm  input regarding safety of home situation requested .PCP Dr Obando is aware of all above .
Frequent falls at home ,home situation seems to be not safe .patient lives with her elderly  and has history of noncompliance and missing dialysis . Admitted with falls and weakness .After improvement of symptoms and stabilization  patient will be required to be assessed   for rehabilitation . Social service input requested for [facility] placement . Patient is medically stable to be transferred to rehabilitation facility ,when the bed is available and arranged by social service .
Frequent falls at home ,home situation seems to be not safe .patient lives with her elderly  and has history of noncompliance and missing dialysis . Admitted with falls and weakness .After improvement of symptoms and stabilization  patient will be required to be assessed   for rehabilitation . Social service input requested for [facility] placement . Patient is medically stable to be transferred to rehabilitation facility ,when the bed is available and arranged by social service .Seen by PT-[facility] recommended   Patient is adamantly refusing rehab placement ,she was reminded that she is admitted with frequent falls but she is still stating that " will be helping at home ".Discussed with  on 1 e and sw/cm  input regarding safety of home situation requested .PCP Dr Obando is aware of all above .

## 2022-04-30 NOTE — PROGRESS NOTE ADULT - PROBLEM SELECTOR PLAN 5
serial cbc ,anemia workup noted , continue home medications

## 2022-04-30 NOTE — PROGRESS NOTE ADULT - SUBJECTIVE AND OBJECTIVE BOX
Date/Time Patient Seen:  		  Referring MD:   Data Reviewed	       Patient is a 71y old  Female who presents with a chief complaint of falls ,weakness (29 Apr 2022 15:50)      Subjective/HPI     PAST MEDICAL & SURGICAL HISTORY:  Diabetes mellitus II    HTN (hypertension)    h/o Anxiety attack    Depression    h/o Myocardial infarct 2007    CAD (coronary artery disease)    CAD (coronary artery disease)    h/o Hepatitis A 1969  currently resolved, no symptoms    PAD (peripheral artery disease)    Murmur, cardiac    h/o Smoking  quitted 3/2012    CRF (chronic renal failure), unspecified stage    Dialysis patient    Anemia secondary to renal failure    HTN (hypertension)    coronary stent 2007    s/p Ovarian cyst removal    s/p surgical removal of benign Skin lesion epigastric area          Medication list         MEDICATIONS  (STANDING):  aspirin enteric coated 81 milliGRAM(s) Oral daily  atorvastatin 40 milliGRAM(s) Oral at bedtime  calcium acetate 667 milliGRAM(s) Oral three times a day with meals  ceFAZolin   IVPB 1000 milliGRAM(s) IV Intermittent every 24 hours  cloNIDine 0.1 milliGRAM(s) Oral every 12 hours  clopidogrel Tablet 75 milliGRAM(s) Oral daily  dextrose 5%. 1000 milliLiter(s) (100 mL/Hr) IV Continuous <Continuous>  dextrose 5%. 1000 milliLiter(s) (50 mL/Hr) IV Continuous <Continuous>  dextrose 50% Injectable 25 Gram(s) IV Push once  dextrose 50% Injectable 12.5 Gram(s) IV Push once  dextrose 50% Injectable 25 Gram(s) IV Push once  diltiazem    Tablet 60 milliGRAM(s) Oral every 8 hours  epoetin fawad-epbx (RETACRIT) Injectable 27326 Unit(s) IV Push <User Schedule>  glucagon  Injectable 1 milliGRAM(s) IntraMuscular once  glucagon  Injectable 1 milliGRAM(s) IntraMuscular once  heparin   Injectable 5000 Unit(s) SubCutaneous every 12 hours  imipramine 50 milliGRAM(s) Oral daily  insulin glargine Injectable (LANTUS) 12 Unit(s) SubCutaneous at bedtime  insulin lispro (ADMELOG) corrective regimen sliding scale   SubCutaneous three times a day before meals  insulin lispro (ADMELOG) corrective regimen sliding scale   SubCutaneous at bedtime  lactobacillus acidophilus 1 Tablet(s) Oral two times a day  metoprolol tartrate 100 milliGRAM(s) Oral every 12 hours  multivitamin 1 Tablet(s) Oral daily  pantoprazole    Tablet 40 milliGRAM(s) Oral before breakfast  povidone iodine 10% Solution 1 Application(s) Topical daily  senna 2 Tablet(s) Oral at bedtime    MEDICATIONS  (PRN):  acetaminophen     Tablet .. 650 milliGRAM(s) Oral every 6 hours PRN Temp greater or equal to 38C (100.4F), Mild Pain (1 - 3)  aluminum hydroxide/magnesium hydroxide/simethicone Suspension 30 milliLiter(s) Oral every 4 hours PRN Dyspepsia  dextrose Oral Gel 15 Gram(s) Oral once PRN Blood Glucose LESS THAN 70 milliGRAM(s)/deciliter  melatonin 3 milliGRAM(s) Oral at bedtime PRN Insomnia  ondansetron Injectable 4 milliGRAM(s) IV Push every 8 hours PRN Nausea and/or Vomiting         Vitals log        ICU Vital Signs Last 24 Hrs  T(C): 36.8 (30 Apr 2022 05:05), Max: 37.1 (29 Apr 2022 20:06)  T(F): 98.2 (30 Apr 2022 05:05), Max: 98.8 (29 Apr 2022 20:06)  HR: 68 (30 Apr 2022 05:05) (68 - 70)  BP: 152/56 (30 Apr 2022 05:05) (148/66 - 163/69)  BP(mean): --  ABP: --  ABP(mean): --  RR: 17 (30 Apr 2022 05:05) (17 - 18)  SpO2: 91% (30 Apr 2022 05:05) (88% - 92%)           Input and Output:  I&O's Detail    28 Apr 2022 07:01  -  29 Apr 2022 07:00  --------------------------------------------------------  IN:    Oral Fluid: 240 mL  Total IN: 240 mL    OUT:    Other (mL): 1000 mL  Total OUT: 1000 mL    Total NET: -760 mL          Lab Data                        9.7    11.91 )-----------( 335      ( 29 Apr 2022 08:48 )             31.2     04-29    133<L>  |  97  |  33<H>  ----------------------------<  210<H>  4.5   |  28  |  3.90<H>    Ca    9.6      29 Apr 2022 08:48    TPro  6.8  /  Alb  2.6<L>  /  TBili  0.3  /  DBili  <0.1  /  AST  24  /  ALT  <6<L>  /  AlkPhos  90  04-28            Review of Systems	      Objective     Physical Examination    heart s1s2  lung dec BS  abd soft      Pertinent Lab findings & Imaging      Dexter:  NO   Adequate UO     I&O's Detail    28 Apr 2022 07:01  -  29 Apr 2022 07:00  --------------------------------------------------------  IN:    Oral Fluid: 240 mL  Total IN: 240 mL    OUT:    Other (mL): 1000 mL  Total OUT: 1000 mL    Total NET: -760 mL               Discussed with:     Cultures:	        Radiology

## 2022-04-30 NOTE — PROGRESS NOTE ADULT - PROBLEM SELECTOR PLAN 2
completed course of tx ,podiatry eval
- No active signs of infection noted.   - Pt has a history of osteomyelitis to the Right 1st and 2nd digit with gangrenous changes.   - Osteomyelitis to the Left ankle that was being treated with a PICC line and IV abx.  -  Pt is refusing any podiatry surgical intervention at this time. All risk and benefits discussed with pt.
completed course of tx ,podiatry eval
- No active signs of infection noted.   - Pt has a history of osteomyelitis to the Right 1st and 2nd digit with gangrenous changes.   - Osteomyelitis to the Left ankle that was being treated with a PICC line and IV abx.  -  Pt is refusing any podiatry surgical intervention at this time. All risk and benefits discussed with pt.
- No active signs of infection noted.   - Pt has a history of osteomyelitis to the Right 1st and 2nd digit with gangrenous changes.   - Osteomyelitis to the Left ankle that was being treated with a PICC line and IV abx.  -  Pt is refusing any podiatry surgical intervention at this time. All risk and benefits discussed with pt.
completed course of tx ,podiatry eval
ID cons requested ,podiatry eval
completed course of tx ,podiatry eval
ID cons requested ,podiatry eval
ID cons requested ,podiatry eval

## 2022-04-30 NOTE — PROGRESS NOTE ADULT - PROBLEM SELECTOR PLAN 1
fall precautions ,PT/OT ,MEGHAN placement
fall precautions ,PT/OT ,MEGHAN placement
Pt seen and evaluated  - Labs and films reviewed  - Pt has a history of osteomyelitis to the Right 1st and 2nd digit with gangrenous changes. Osteomyelitis to the Left ankle that was being treated with a PICC line and IV abx. No active signs of infection noted.   - Dressed all wounds with DSD  - Recommending vascular consult for possible vascular Intervention, during last admission pt was being worked up for possible vascular intervention  - Pt is refusing any podiatry surgical intervention at this time. All risk and benefits discussed with patient   - Continue IV abx per ID  - Continue med management   - Pt is stable from podiatry standpoint    Wound Care Instructions  1. Remove the previous dressings with care and cleansed the wound with saline, dry it with sterile gauze  2. Apply  dry sterile dressings. Change every 3-4 days.    3. Patient to follow-up in Wound Care/[facility] within 3-5 days  with Dr. Lau or Dr. Pastor. Patient to call [phone] to make an appointment after discharge.    4. Patient to watch for any signs of infection, included but not limited to fever, chills, nausea, excessive drainage, foul odor. Patient to report to the ED immediately.
Pt seen and evaluated  - Labs and films reviewed  - Pt has a history of osteomyelitis to the Right 1st and 2nd digit with gangrenous changes. Osteomyelitis to the Left ankle that was being treated with a PICC line and IV abx. No active signs of infection noted.   - Dressed all wounds with DSD  - Recommending vascular consult for possible vascular Intervention, during last admission pt was being worked up for possible vascular intervention  - Pt is refusing any podiatry surgical intervention at this time. All risk and benefits discussed with patient   - Continue IV abx per ID  - Continue med management   - Pt is stable from podiatry standpoint    Wound Care Instructions  1. Remove the previous dressings with care and cleansed the wound with saline, dry it with sterile gauze  2. Apply  dry sterile dressings. Change every 3-4 days.    3. Patient to follow-up in Wound Care/Hyperbaric Clinic within 3-5 days  with Dr. Lau or Dr. Pastor. Patient to call [phone] to make an appointment after discharge.    4. Patient to watch for any signs of infection, included but not limited to fever, chills, nausea, excessive drainage, foul odor. Patient to report to the ED immediately.
Pt seen and evaluated  - Labs and films reviewed  - Pt has a history of osteomyelitis to the Right 1st and 2nd digit with gangrenous changes. Osteomyelitis to the Left ankle that was being treated with a PICC line and IV abx. No active signs of infection noted.   - Dressed all wounds with DSD  - Recommending vascular consult for possible vascular Intervention, during last admission pt was being worked up for possible vascular intervention  - Pt is refusing any podiatry surgical intervention at this time. All risk and benefits discussed with pt.   - Continue IV abx per ID  - Continue med management   - Pt is stable from podiatry standpoint    Wound Care Instructions  1. Remove the previous dressings with care and cleansed the wound with saline, dry it with sterile gauze  2. Apply  dry sterile dressings. Change every 3-4 days.    3. Patient to follow-up in Wound Care/Hyperbaric Clinic within 3-5 days  with Dr. Lau or Dr. Pastor. Patient to call [phone] to make an appointment after discharge.    4. Patient to watch for any signs of infection, included but not limited to fever, chills, nausea, excessive drainage, foul odor. Patient to report to the ED immediately.
fall precautions ,PT/OT ,MEGHAN placement

## 2022-04-30 NOTE — PROGRESS NOTE ADULT - SUBJECTIVE AND OBJECTIVE BOX
Patient is a 71y Female with a known history of :  Falls [W19.XXXA]    DM (diabetes mellitus) [E11.9]    Acute osteomyelitis [M86.10]    ESRD on dialysis [N18.6]    Anemia secondary to renal failure [N18.9]    Atrial fibrillation [I48.91]    PAD (peripheral artery disease) [I73.9]    Non-compliant patient [Z91.19]    Prophylactic measure [Z29.9]    Need for follow-up by  [Z78.9]    Gangrenous toe [I96]    Chronic osteomyelitis [M86.60]      HPI:  Patient came to ED because she  fell today on her way to dialysis, pt states that she is falling everyday and always feels weak. pt poor historian, missed dialysis today, will need admission for placement. Recent admission 04/05/22 -72yo female bib ems with sob, pt states she has missed the last 2 rounds of dialysis and is due today, and has been having worsening sob, no cough, fever, chills, no chest pain no other complaints .Found to have hyperkalemia Diagnosed with foot infection MRI showed evidence of acute osteomyelitis of L medial malleolus and distal aspect of R 1st toe ,poa . -ID recommended  cefazolin 2g-2g-3g with HD x 4 weeks .Patient was discharged home with home care and iv abx at HD center Palliative care consult requested ,to discuss advance directives and complete MOLST  (22 Apr 2022 16:22)      REVIEW OF SYSTEMS:    CONSTITUTIONAL: No fever, weight loss, or fatigue  EYES: No eye pain, visual disturbances, or discharge  ENMT:  No difficulty hearing, tinnitus, vertigo; No sinus or throat pain  NECK: No pain or stiffness  BREASTS: No pain, masses, or nipple discharge  RESPIRATORY: No cough, wheezing, chills or hemoptysis; No shortness of breath  CARDIOVASCULAR: No chest pain, palpitations, dizziness, or leg swelling  GASTROINTESTINAL: No abdominal or epigastric pain. No nausea, vomiting, or hematemesis; No diarrhea or constipation. No melena or hematochezia.  GENITOURINARY: No dysuria, frequency, hematuria, or incontinence  NEUROLOGICAL: No headaches, memory loss, loss of strength, numbness, or tremors  SKIN: No itching, burning, rashes, or lesions   LYMPH NODES: No enlarged glands  ENDOCRINE: No heat or cold intolerance; No hair loss  MUSCULOSKELETAL: No joint pain or swelling; No muscle, back, or extremity pain  PSYCHIATRIC: No depression, anxiety, mood swings, or difficulty sleeping  HEME/LYMPH: No easy bruising, or bleeding gums  ALLERGY AND IMMUNOLOGIC: No hives or eczema    MEDICATIONS  (STANDING):  aspirin enteric coated 81 milliGRAM(s) Oral daily  atorvastatin 40 milliGRAM(s) Oral at bedtime  calcium acetate 667 milliGRAM(s) Oral three times a day with meals  ceFAZolin   IVPB 1000 milliGRAM(s) IV Intermittent every 24 hours  cloNIDine 0.1 milliGRAM(s) Oral every 12 hours  clopidogrel Tablet 75 milliGRAM(s) Oral daily  dextrose 5%. 1000 milliLiter(s) (100 mL/Hr) IV Continuous <Continuous>  dextrose 5%. 1000 milliLiter(s) (50 mL/Hr) IV Continuous <Continuous>  dextrose 50% Injectable 25 Gram(s) IV Push once  dextrose 50% Injectable 12.5 Gram(s) IV Push once  dextrose 50% Injectable 25 Gram(s) IV Push once  diltiazem    Tablet 60 milliGRAM(s) Oral every 8 hours  epoetin fawad-epbx (RETACRIT) Injectable 72407 Unit(s) IV Push <User Schedule>  glucagon  Injectable 1 milliGRAM(s) IntraMuscular once  glucagon  Injectable 1 milliGRAM(s) IntraMuscular once  heparin   Injectable 5000 Unit(s) SubCutaneous every 12 hours  imipramine 50 milliGRAM(s) Oral daily  insulin glargine Injectable (LANTUS) 12 Unit(s) SubCutaneous at bedtime  insulin lispro (ADMELOG) corrective regimen sliding scale   SubCutaneous three times a day before meals  insulin lispro (ADMELOG) corrective regimen sliding scale   SubCutaneous at bedtime  lactobacillus acidophilus 1 Tablet(s) Oral two times a day  metoprolol tartrate 100 milliGRAM(s) Oral every 12 hours  multivitamin 1 Tablet(s) Oral daily  pantoprazole    Tablet 40 milliGRAM(s) Oral before breakfast  povidone iodine 10% Solution 1 Application(s) Topical daily  senna 2 Tablet(s) Oral at bedtime    MEDICATIONS  (PRN):  acetaminophen     Tablet .. 650 milliGRAM(s) Oral every 6 hours PRN Temp greater or equal to 38C (100.4F), Mild Pain (1 - 3)  aluminum hydroxide/magnesium hydroxide/simethicone Suspension 30 milliLiter(s) Oral every 4 hours PRN Dyspepsia  dextrose Oral Gel 15 Gram(s) Oral once PRN Blood Glucose LESS THAN 70 milliGRAM(s)/deciliter  melatonin 3 milliGRAM(s) Oral at bedtime PRN Insomnia  ondansetron Injectable 4 milliGRAM(s) IV Push every 8 hours PRN Nausea and/or Vomiting      ALLERGIES: latex (Unknown)  No Known Drug Allergies      FAMILY HISTORY:      PHYSICAL EXAMINATION:  -----------------------------  T(C): 36.8 (04-30-22 @ 05:05), Max: 37.1 (04-29-22 @ 20:06)  HR: 68 (04-30-22 @ 05:05) (68 - 70)  BP: 152/56 (04-30-22 @ 05:05) (148/66 - 163/69)  RR: 17 (04-30-22 @ 05:05) (17 - 18)  SpO2: 91% (04-30-22 @ 05:05) (88% - 92%)  Wt(kg): --        VITALS  T(C): 36.8 (04-30-22 @ 05:05), Max: 37.1 (04-29-22 @ 20:06)  HR: 68 (04-30-22 @ 05:05) (68 - 70)  BP: 152/56 (04-30-22 @ 05:05) (148/66 - 163/69)  RR: 17 (04-30-22 @ 05:05) (17 - 18)  SpO2: 91% (04-30-22 @ 05:05) (88% - 92%)    Constitutional: well developed, normal appearance, well groomed, well nourished, no deformities and no acute distress.   Eyes: the conjunctiva exhibited no abnormalities and the eyelids demonstrated no xanthelasmas.   HEENT: normal oral mucosa, no oral pallor and no oral cyanosis.   Neck: normal jugular venous A waves present, normal jugular venous V waves present and no jugular venous wilson A waves.   Pulmonary: no respiratory distress, normal respiratory rhythm and effort, no accessory muscle use and lungs were clear to auscultation bilaterally.   Cardiovascular: heart rate and rhythm were normal, normal S1 and S2 and no murmur, gallop, rub, heave or thrill are present.   Abdomen: soft, non-tender, no hepato-splenomegaly and no abdominal mass palpated.   Musculoskeletal: the gait could not be assessed..   Extremities: no clubbing of the fingernails, no localized cyanosis, no petechial hemorrhages and no ischemic changes.   Skin: normal skin color and pigmentation, no rash, no venous stasis, no skin lesions, no skin ulcer and no xanthoma was observed.   Psychiatric: oriented to person, place, and time, the affect was normal, the mood was normal and not feeling anxious.     LABS:   --------  04-29    133<L>  |  97  |  33<H>  ----------------------------<  210<H>  4.5   |  28  |  3.90<H>    Ca    9.6      29 Apr 2022 08:48    TPro  6.8  /  Alb  2.6<L>  /  TBili  0.3  /  DBili  <0.1  /  AST  24  /  ALT  <6<L>  /  AlkPhos  90  04-28                         9.7    11.91 )-----------( 335      ( 29 Apr 2022 08:48 )             31.2                 RADIOLOGY:  -----------------    ECG:     ECHO:

## 2022-04-30 NOTE — PROGRESS NOTE ADULT - PROBLEM SELECTOR PLAN 6
HD as per Dr Perez ,serial bmp

## 2022-04-30 NOTE — PROGRESS NOTE ADULT - PROBLEM SELECTOR PLAN 3
- Pending vascular consult    Last Admission 4/9/2022  - CTA angio: Severe atherosclerotic changes involving the superficial femoral and popliteal arteries bilaterally with multifocal high-grade stenoses or occlusion  - Arterial duplex: Bilateral lower extremity atheromatous disease.  Progressively delayed upstroke of right common femoral, superficial femoral, and popliteal arteries noted. The possibility of inflow significant stenosis cannot be excluded. Trifurcation arteries are not visualized due to patient motion. Right dorsalis pedis artery could not be assessed due to overlying bandage material.
Accuchecks monitoring and insulin corrective regimen  sliding scale coverage with short acting inslulin, add longacting insulin as needed ,no concentrated sweets diet, serial labs ,HbA1C,education
- Pending vascular consult    Last Admission 4/9/2022  - CTA angio: Severe atherosclerotic changes involving the superficial femoral and popliteal arteries bilaterally with multifocal high-grade stenoses or occlusion  - Arterial duplex: Bilateral lower extremity atheromatous disease.  Progressively delayed upstroke of right common femoral, superficial femoral, and popliteal arteries noted. The possibility of inflow significant stenosis cannot be excluded. Trifurcation arteries are not visualized due to patient motion. Right dorsalis pedis artery could not be assessed due to overlying bandage material.
Accuchecks monitoring and insulin corrective regimen  sliding scale coverage with short acting inslulin, add longacting insulin as needed ,no concentrated sweets diet, serial labs ,HbA1C,education
- Pending vascular consult    Last Admission 4/9/2022  - CTA angio: Severe atherosclerotic changes involving the superficial femoral and popliteal arteries bilaterally with multifocal high-grade stenoses or occlusion  - Arterial duplex: Bilateral lower extremity atheromatous disease.  Progressively delayed upstroke of right common femoral, superficial femoral, and popliteal arteries noted. The possibility of inflow significant stenosis cannot be excluded. Trifurcation arteries are not visualized due to patient motion. Right dorsalis pedis artery could not be assessed due to overlying bandage material.
Accuchecks monitoring and insulin corrective regimen  sliding scale coverage with short acting inslulin, add longacting insulin as needed ,no concentrated sweets diet, serial labs ,HbA1C,education

## 2022-04-30 NOTE — PROGRESS NOTE ADULT - SUBJECTIVE AND OBJECTIVE BOX
PROGRESS NOTE  Patient is a 71y old  Female who presents with a chief complaint of falls ,weakness (30 Apr 2022 06:39)    Chart and available morning labs /imaging are reviewed electronically , urgent issues addressed . More information  is being added upon completion of rounds , when more information is collected and management discussed with consultants , medical staff and social service/case management on the floor   OVERNIGHT      HPI:  Patient came to ED because she  fell today on her way to dialysis, pt states that she is falling everyday and always feels weak. pt poor historian, missed dialysis today, will need admission for placement. Recent admission 04/05/22 -72yo female bib ems with sob, pt states she has missed the last 2 rounds of dialysis and is due today, and has been having worsening sob, no cough, fever, chills, no chest pain no other complaints .Found to have hyperkalemia Diagnosed with foot infection MRI showed evidence of acute osteomyelitis of L medial malleolus and distal aspect of R 1st toe ,poa . -ID recommended  cefazolin 2g-2g-3g with HD x 4 weeks .Patient was discharged home with home care and iv abx at HD center Palliative care consult requested ,to discuss advance directives and complete MOLST  (22 Apr 2022 16:22)    PAST MEDICAL & SURGICAL HISTORY:  Diabetes mellitus II    HTN (hypertension)    h/o Anxiety attack    Depression    h/o Myocardial infarct 2007    CAD (coronary artery disease)    h/o Hepatitis A 1969  currently resolved, no symptoms    PAD (peripheral artery disease)    Murmur, cardiac    h/o Smoking  quitted 3/2012    CRF (chronic renal failure), unspecified stage    Dialysis patient    Anemia secondary to renal failure    HTN (hypertension)    coronary stent 2007    s/p Ovarian cyst removal    s/p surgical removal of benign Skin lesion epigastric area        MEDICATIONS  (STANDING):  aspirin enteric coated 81 milliGRAM(s) Oral daily  atorvastatin 40 milliGRAM(s) Oral at bedtime  calcium acetate 667 milliGRAM(s) Oral three times a day with meals  ceFAZolin   IVPB 1000 milliGRAM(s) IV Intermittent every 24 hours  cloNIDine 0.1 milliGRAM(s) Oral every 12 hours  clopidogrel Tablet 75 milliGRAM(s) Oral daily  dextrose 5%. 1000 milliLiter(s) (100 mL/Hr) IV Continuous <Continuous>  dextrose 5%. 1000 milliLiter(s) (50 mL/Hr) IV Continuous <Continuous>  dextrose 50% Injectable 25 Gram(s) IV Push once  dextrose 50% Injectable 12.5 Gram(s) IV Push once  dextrose 50% Injectable 25 Gram(s) IV Push once  diltiazem    Tablet 60 milliGRAM(s) Oral every 8 hours  epoetin fawad-epbx (RETACRIT) Injectable 56008 Unit(s) IV Push <User Schedule>  glucagon  Injectable 1 milliGRAM(s) IntraMuscular once  glucagon  Injectable 1 milliGRAM(s) IntraMuscular once  heparin   Injectable 5000 Unit(s) SubCutaneous every 12 hours  imipramine 50 milliGRAM(s) Oral daily  insulin glargine Injectable (LANTUS) 12 Unit(s) SubCutaneous at bedtime  insulin lispro (ADMELOG) corrective regimen sliding scale   SubCutaneous three times a day before meals  insulin lispro (ADMELOG) corrective regimen sliding scale   SubCutaneous at bedtime  lactobacillus acidophilus 1 Tablet(s) Oral two times a day  metoprolol tartrate 100 milliGRAM(s) Oral every 12 hours  multivitamin 1 Tablet(s) Oral daily  pantoprazole    Tablet 40 milliGRAM(s) Oral before breakfast  povidone iodine 10% Solution 1 Application(s) Topical daily  senna 2 Tablet(s) Oral at bedtime    MEDICATIONS  (PRN):  acetaminophen     Tablet .. 650 milliGRAM(s) Oral every 6 hours PRN Temp greater or equal to 38C (100.4F), Mild Pain (1 - 3)  aluminum hydroxide/magnesium hydroxide/simethicone Suspension 30 milliLiter(s) Oral every 4 hours PRN Dyspepsia  dextrose Oral Gel 15 Gram(s) Oral once PRN Blood Glucose LESS THAN 70 milliGRAM(s)/deciliter  melatonin 3 milliGRAM(s) Oral at bedtime PRN Insomnia  ondansetron Injectable 4 milliGRAM(s) IV Push every 8 hours PRN Nausea and/or Vomiting      OBJECTIVE    T(C): 36.8 (04-30-22 @ 05:05), Max: 37.1 (04-29-22 @ 20:06)  HR: 68 (04-30-22 @ 05:05) (68 - 70)  BP: 152/56 (04-30-22 @ 05:05) (148/66 - 163/69)  RR: 17 (04-30-22 @ 05:05) (17 - 18)  SpO2: 91% (04-30-22 @ 05:05) (88% - 92%)  Wt(kg): --  I&O's Summary        REVIEW OF SYSTEMS:  CONSTITUTIONAL: No fever, weight loss, or fatigue  EYES: No eye pain, visual disturbances, or discharge  ENMT:   No sinus or throat pain  NECK: No pain or stiffness  RESPIRATORY: No cough, wheezing, chills or hemoptysis; No shortness of breath  CARDIOVASCULAR: No chest pain, palpitations, dizziness, or leg swelling  GASTROINTESTINAL: No abdominal pain. No nausea, vomiting; No diarrhea or constipation. No melena or hematochezia.  GENITOURINARY: No dysuria, frequency, hematuria, or incontinence  NEUROLOGICAL: No headaches, memory loss, loss of strength, numbness, or tremors  SKIN: No itching, burning, rashes, or lesions   MUSCULOSKELETAL: No joint pain or swelling; No muscle, back, or extremity pain    PHYSICAL EXAM:  Appearance: NAD. VS past 24 hrs -as above   HEENT:   Moist oral mucosa. Conjunctiva clear b/l.   Neck : supple  Respiratory: Lungs CTAB.  Gastrointestinal:  Soft, nontender. No rebound. No rigidity. BS present	  Cardiovascular: RRR ,S1S2 present  Neurologic: Non-focal. Moving all extremities.  Extremities: No edema. No erythema. No calf tenderness.  Skin: No rashes, No ecchymoses, No cyanosis.	  wounds ,skin lesions-See skin assesment flow sheet   LABS:                        9.7    11.91 )-----------( 335      ( 29 Apr 2022 08:48 )             31.2     04-29    133<L>  |  97  |  33<H>  ----------------------------<  210<H>  4.5   |  28  |  3.90<H>    Ca    9.6      29 Apr 2022 08:48    TPro  6.8  /  Alb  2.6<L>  /  TBili  0.3  /  DBili  <0.1  /  AST  24  /  ALT  <6<L>  /  AlkPhos  90  04-28    CAPILLARY BLOOD GLUCOSE      POCT Blood Glucose.: 264 mg/dL (29 Apr 2022 21:05)  POCT Blood Glucose.: 369 mg/dL (29 Apr 2022 16:33)  POCT Blood Glucose.: 347 mg/dL (29 Apr 2022 11:59)          Culture - Blood (collected 22 Apr 2022 18:38)  Source: .Blood Blood-Peripheral  Final Report (27 Apr 2022 19:01):    No Growth Final    Culture - Blood (collected 22 Apr 2022 18:38)  Source: .Blood Blood-Peripheral  Final Report (27 Apr 2022 19:01):    No Growth Final      RADIOLOGY & ADDITIONAL TESTS:   reviewed elctronically  ASSESSMENT/PLAN: 	     PROGRESS NOTE  Patient is a 71y old  Female who presents with a chief complaint of falls ,weakness (30 Apr 2022 06:39)    Chart and available morning labs /imaging are reviewed electronically , urgent issues addressed . More information  is being added upon completion of rounds , when more information is collected and management discussed with consultants , medical staff and social service/case management on the floor   OVERNIGHT  No new issues reported by medical staff . All above noted Patient is resting in a bed comfortably ..No distress noted     HPI:  Patient came to ED because she  fell today on her way to dialysis, pt states that she is falling everyday and always feels weak. pt poor historian, missed dialysis today, will need admission for placement. Recent admission 04/05/22 -72yo female bib ems with sob, pt states she has missed the last 2 rounds of dialysis and is due today, and has been having worsening sob, no cough, fever, chills, no chest pain no other complaints .Found to have hyperkalemia Diagnosed with foot infection MRI showed evidence of acute osteomyelitis of L medial malleolus and distal aspect of R 1st toe ,poa . -ID recommended  cefazolin 2g-2g-3g with HD x 4 weeks .Patient was discharged home with home care and iv abx at HD center Palliative care consult requested ,to discuss advance directives and complete MOLST  (22 Apr 2022 16:22)    PAST MEDICAL & SURGICAL HISTORY:  Diabetes mellitus II    HTN (hypertension)    h/o Anxiety attack    Depression    h/o Myocardial infarct 2007    CAD (coronary artery disease)    h/o Hepatitis A 1969  currently resolved, no symptoms    PAD (peripheral artery disease)    Murmur, cardiac    h/o Smoking  quitted 3/2012    CRF (chronic renal failure), unspecified stage    Dialysis patient    Anemia secondary to renal failure    HTN (hypertension)    coronary stent 2007    s/p Ovarian cyst removal    s/p surgical removal of benign Skin lesion epigastric area        MEDICATIONS  (STANDING):  aspirin enteric coated 81 milliGRAM(s) Oral daily  atorvastatin 40 milliGRAM(s) Oral at bedtime  calcium acetate 667 milliGRAM(s) Oral three times a day with meals  ceFAZolin   IVPB 1000 milliGRAM(s) IV Intermittent every 24 hours  cloNIDine 0.1 milliGRAM(s) Oral every 12 hours  clopidogrel Tablet 75 milliGRAM(s) Oral daily  dextrose 5%. 1000 milliLiter(s) (100 mL/Hr) IV Continuous <Continuous>  dextrose 5%. 1000 milliLiter(s) (50 mL/Hr) IV Continuous <Continuous>  dextrose 50% Injectable 25 Gram(s) IV Push once  dextrose 50% Injectable 12.5 Gram(s) IV Push once  dextrose 50% Injectable 25 Gram(s) IV Push once  diltiazem    Tablet 60 milliGRAM(s) Oral every 8 hours  epoetin fawad-epbx (RETACRIT) Injectable 03508 Unit(s) IV Push <User Schedule>  glucagon  Injectable 1 milliGRAM(s) IntraMuscular once  glucagon  Injectable 1 milliGRAM(s) IntraMuscular once  heparin   Injectable 5000 Unit(s) SubCutaneous every 12 hours  imipramine 50 milliGRAM(s) Oral daily  insulin glargine Injectable (LANTUS) 12 Unit(s) SubCutaneous at bedtime  insulin lispro (ADMELOG) corrective regimen sliding scale   SubCutaneous three times a day before meals  insulin lispro (ADMELOG) corrective regimen sliding scale   SubCutaneous at bedtime  lactobacillus acidophilus 1 Tablet(s) Oral two times a day  metoprolol tartrate 100 milliGRAM(s) Oral every 12 hours  multivitamin 1 Tablet(s) Oral daily  pantoprazole    Tablet 40 milliGRAM(s) Oral before breakfast  povidone iodine 10% Solution 1 Application(s) Topical daily  senna 2 Tablet(s) Oral at bedtime    MEDICATIONS  (PRN):  acetaminophen     Tablet .. 650 milliGRAM(s) Oral every 6 hours PRN Temp greater or equal to 38C (100.4F), Mild Pain (1 - 3)  aluminum hydroxide/magnesium hydroxide/simethicone Suspension 30 milliLiter(s) Oral every 4 hours PRN Dyspepsia  dextrose Oral Gel 15 Gram(s) Oral once PRN Blood Glucose LESS THAN 70 milliGRAM(s)/deciliter  melatonin 3 milliGRAM(s) Oral at bedtime PRN Insomnia  ondansetron Injectable 4 milliGRAM(s) IV Push every 8 hours PRN Nausea and/or Vomiting      OBJECTIVE    T(C): 36.8 (04-30-22 @ 05:05), Max: 37.1 (04-29-22 @ 20:06)  HR: 68 (04-30-22 @ 05:05) (68 - 70)  BP: 152/56 (04-30-22 @ 05:05) (148/66 - 163/69)  RR: 17 (04-30-22 @ 05:05) (17 - 18)  SpO2: 91% (04-30-22 @ 05:05) (88% - 92%)  Wt(kg): --  I&O's Summary        REVIEW OF SYSTEMS:  CONSTITUTIONAL: No fever, weight loss, or fatigue  EYES: No eye pain, visual disturbances, or discharge  ENMT:   No sinus or throat pain  NECK: No pain or stiffness  RESPIRATORY: No cough, wheezing, chills or hemoptysis; No shortness of breath  CARDIOVASCULAR: No chest pain, palpitations, dizziness, or leg swelling  GASTROINTESTINAL: No abdominal pain. No nausea, vomiting; No diarrhea or constipation. No melena or hematochezia.  GENITOURINARY: No dysuria, frequency, hematuria, or incontinence  NEUROLOGICAL: No headaches, memory loss, loss of strength, numbness, or tremors  SKIN: No itching, burning, rashes, or lesions   MUSCULOSKELETAL: No joint pain or swelling; No muscle, back, or extremity pain    PHYSICAL EXAM:  Appearance: NAD. VS past 24 hrs -as above   HEENT:   Moist oral mucosa. Conjunctiva clear b/l.   Neck : supple  Respiratory: Lungs CTAB.  Gastrointestinal:  Soft, nontender. No rebound. No rigidity. BS present	  Cardiovascular: RRR ,S1S2 present  Neurologic: Non-focal. Moving all extremities.  Extremities: No edema. No erythema. No calf tenderness.  Skin: No rashes, No ecchymoses, No cyanosis.	  wounds ,skin lesions-See skin assesment flow sheet   LABS:                        9.7    11.91 )-----------( 335      ( 29 Apr 2022 08:48 )             31.2     04-29    133<L>  |  97  |  33<H>  ----------------------------<  210<H>  4.5   |  28  |  3.90<H>    Ca    9.6      29 Apr 2022 08:48    TPro  6.8  /  Alb  2.6<L>  /  TBili  0.3  /  DBili  <0.1  /  AST  24  /  ALT  <6<L>  /  AlkPhos  90  04-28    CAPILLARY BLOOD GLUCOSE      POCT Blood Glucose.: 264 mg/dL (29 Apr 2022 21:05)  POCT Blood Glucose.: 369 mg/dL (29 Apr 2022 16:33)  POCT Blood Glucose.: 347 mg/dL (29 Apr 2022 11:59)          Culture - Blood (collected 22 Apr 2022 18:38)  Source: .Blood Blood-Peripheral  Final Report (27 Apr 2022 19:01):    No Growth Final    Culture - Blood (collected 22 Apr 2022 18:38)  Source: .Blood Blood-Peripheral  Final Report (27 Apr 2022 19:01):    No Growth Final      RADIOLOGY & ADDITIONAL TESTS:   reviewed elctronically  ASSESSMENT/PLAN: 	  25 minutes aggregate time was spent on this visit, 50% visit time spent in care co-ordination with other attendings and counselling patient .I have discussed care plan with patient / HCP/family member ,who expressed understanding of problems treatment and their effect and side effects, alternatives in details. I have asked if they have any questions and concerns and appropriately addressed them to best of my ability.

## 2022-05-01 RX ADMIN — HEPARIN SODIUM 5000 UNIT(S): 5000 INJECTION INTRAVENOUS; SUBCUTANEOUS at 17:37

## 2022-05-01 RX ADMIN — Medication 60 MILLIGRAM(S): at 05:21

## 2022-05-01 RX ADMIN — Medication 1 TABLET(S): at 05:21

## 2022-05-01 RX ADMIN — ATORVASTATIN CALCIUM 40 MILLIGRAM(S): 80 TABLET, FILM COATED ORAL at 22:07

## 2022-05-01 RX ADMIN — Medication 667 MILLIGRAM(S): at 17:36

## 2022-05-01 RX ADMIN — HEPARIN SODIUM 5000 UNIT(S): 5000 INJECTION INTRAVENOUS; SUBCUTANEOUS at 05:21

## 2022-05-01 RX ADMIN — Medication 100 MILLIGRAM(S): at 05:22

## 2022-05-01 RX ADMIN — Medication 1: at 22:07

## 2022-05-01 RX ADMIN — SENNA PLUS 2 TABLET(S): 8.6 TABLET ORAL at 22:10

## 2022-05-01 RX ADMIN — Medication 1 TABLET(S): at 12:37

## 2022-05-01 RX ADMIN — Medication 50 MILLIGRAM(S): at 12:36

## 2022-05-01 RX ADMIN — Medication 4: at 17:37

## 2022-05-01 RX ADMIN — Medication 60 MILLIGRAM(S): at 22:07

## 2022-05-01 RX ADMIN — Medication 0.1 MILLIGRAM(S): at 06:54

## 2022-05-01 RX ADMIN — Medication 4: at 08:18

## 2022-05-01 RX ADMIN — INSULIN GLARGINE 12 UNIT(S): 100 INJECTION, SOLUTION SUBCUTANEOUS at 22:10

## 2022-05-01 RX ADMIN — CLOPIDOGREL BISULFATE 75 MILLIGRAM(S): 75 TABLET, FILM COATED ORAL at 12:37

## 2022-05-01 RX ADMIN — Medication 100 MILLIGRAM(S): at 17:37

## 2022-05-01 RX ADMIN — Medication 667 MILLIGRAM(S): at 08:18

## 2022-05-01 RX ADMIN — Medication 3 MILLIGRAM(S): at 22:15

## 2022-05-01 RX ADMIN — Medication 100 MILLIGRAM(S): at 18:43

## 2022-05-01 RX ADMIN — PANTOPRAZOLE SODIUM 40 MILLIGRAM(S): 20 TABLET, DELAYED RELEASE ORAL at 06:50

## 2022-05-01 RX ADMIN — Medication 1 TABLET(S): at 17:36

## 2022-05-01 RX ADMIN — Medication 1 APPLICATION(S): at 12:38

## 2022-05-01 RX ADMIN — Medication 81 MILLIGRAM(S): at 12:37

## 2022-05-01 RX ADMIN — Medication 4: at 12:46

## 2022-05-01 RX ADMIN — Medication 0.1 MILLIGRAM(S): at 17:36

## 2022-05-01 RX ADMIN — Medication 667 MILLIGRAM(S): at 12:37

## 2022-05-01 RX ADMIN — Medication 60 MILLIGRAM(S): at 14:21

## 2022-05-01 NOTE — PROGRESS NOTE ADULT - SUBJECTIVE AND OBJECTIVE BOX
Patient is a 71y Female with a known history of :  Falls [W19.XXXA]    DM (diabetes mellitus) [E11.9]    Acute osteomyelitis [M86.10]    ESRD on dialysis [N18.6]    Anemia secondary to renal failure [N18.9]    Atrial fibrillation [I48.91]    PAD (peripheral artery disease) [I73.9]    Non-compliant patient [Z91.19]    Prophylactic measure [Z29.9]    Need for follow-up by  [Z78.9]    Gangrenous toe [I96]    Chronic osteomyelitis [M86.60]      HPI:  Patient came to ED because she  fell today on her way to dialysis, pt states that she is falling everyday and always feels weak. pt poor historian, missed dialysis today, will need admission for placement. Recent admission 04/05/22 -72yo female bib ems with sob, pt states she has missed the last 2 rounds of dialysis and is due today, and has been having worsening sob, no cough, fever, chills, no chest pain no other complaints .Found to have hyperkalemia Diagnosed with foot infection MRI showed evidence of acute osteomyelitis of L medial malleolus and distal aspect of R 1st toe ,poa . -ID recommended  cefazolin 2g-2g-3g with HD x 4 weeks .Patient was discharged home with home care and iv abx at HD center Palliative care consult requested ,to discuss advance directives and complete MOLST  (22 Apr 2022 16:22)      REVIEW OF SYSTEMS:    CONSTITUTIONAL: No fever, weight loss, or fatigue  EYES: No eye pain, visual disturbances, or discharge  ENMT:  No difficulty hearing, tinnitus, vertigo; No sinus or throat pain  NECK: No pain or stiffness  BREASTS: No pain, masses, or nipple discharge  RESPIRATORY: No cough, wheezing, chills or hemoptysis; No shortness of breath  CARDIOVASCULAR: No chest pain, palpitations, dizziness, or leg swelling  GASTROINTESTINAL: No abdominal or epigastric pain. No nausea, vomiting, or hematemesis; No diarrhea or constipation. No melena or hematochezia.  GENITOURINARY: No dysuria, frequency, hematuria, or incontinence  NEUROLOGICAL: No headaches, memory loss, loss of strength, numbness, or tremors  SKIN: No itching, burning, rashes, or lesions   LYMPH NODES: No enlarged glands  ENDOCRINE: No heat or cold intolerance; No hair loss  MUSCULOSKELETAL: No joint pain or swelling; No muscle, back, or extremity pain  PSYCHIATRIC: No depression, anxiety, mood swings, or difficulty sleeping  HEME/LYMPH: No easy bruising, or bleeding gums  ALLERGY AND IMMUNOLOGIC: No hives or eczema    MEDICATIONS  (STANDING):  aspirin enteric coated 81 milliGRAM(s) Oral daily  atorvastatin 40 milliGRAM(s) Oral at bedtime  calcium acetate 667 milliGRAM(s) Oral three times a day with meals  ceFAZolin   IVPB 1000 milliGRAM(s) IV Intermittent every 24 hours  cloNIDine 0.1 milliGRAM(s) Oral every 12 hours  clopidogrel Tablet 75 milliGRAM(s) Oral daily  dextrose 5%. 1000 milliLiter(s) (100 mL/Hr) IV Continuous <Continuous>  dextrose 5%. 1000 milliLiter(s) (50 mL/Hr) IV Continuous <Continuous>  dextrose 50% Injectable 25 Gram(s) IV Push once  dextrose 50% Injectable 12.5 Gram(s) IV Push once  dextrose 50% Injectable 25 Gram(s) IV Push once  diltiazem    Tablet 60 milliGRAM(s) Oral every 8 hours  epoetin fawad-epbx (RETACRIT) Injectable 94007 Unit(s) IV Push <User Schedule>  glucagon  Injectable 1 milliGRAM(s) IntraMuscular once  glucagon  Injectable 1 milliGRAM(s) IntraMuscular once  heparin   Injectable 5000 Unit(s) SubCutaneous every 12 hours  imipramine 50 milliGRAM(s) Oral daily  insulin glargine Injectable (LANTUS) 12 Unit(s) SubCutaneous at bedtime  insulin lispro (ADMELOG) corrective regimen sliding scale   SubCutaneous three times a day before meals  insulin lispro (ADMELOG) corrective regimen sliding scale   SubCutaneous at bedtime  lactobacillus acidophilus 1 Tablet(s) Oral two times a day  metoprolol tartrate 100 milliGRAM(s) Oral every 12 hours  multivitamin 1 Tablet(s) Oral daily  pantoprazole    Tablet 40 milliGRAM(s) Oral before breakfast  povidone iodine 10% Solution 1 Application(s) Topical daily  senna 2 Tablet(s) Oral at bedtime    MEDICATIONS  (PRN):  acetaminophen     Tablet .. 650 milliGRAM(s) Oral every 6 hours PRN Temp greater or equal to 38C (100.4F), Mild Pain (1 - 3)  aluminum hydroxide/magnesium hydroxide/simethicone Suspension 30 milliLiter(s) Oral every 4 hours PRN Dyspepsia  dextrose Oral Gel 15 Gram(s) Oral once PRN Blood Glucose LESS THAN 70 milliGRAM(s)/deciliter  melatonin 3 milliGRAM(s) Oral at bedtime PRN Insomnia  ondansetron Injectable 4 milliGRAM(s) IV Push every 8 hours PRN Nausea and/or Vomiting      ALLERGIES: latex (Unknown)  No Known Drug Allergies      FAMILY HISTORY:      PHYSICAL EXAMINATION:  -----------------------------  T(C): 37 (05-01-22 @ 04:56), Max: 37.9 (04-30-22 @ 20:13)  HR: 98 (05-01-22 @ 04:56) (57 - 102)  BP: 128/60 (05-01-22 @ 04:56) (107/62 - 167/64)  RR: 17 (05-01-22 @ 04:56) (17 - 18)  SpO2: 92% (05-01-22 @ 04:56) (92% - 97%)  Wt(kg): --    04-30 @ 07:01  -  05-01 @ 07:00  --------------------------------------------------------  IN:  Total IN: 0 mL    OUT:    Other (mL): 1000 mL  Total OUT: 1000 mL    Total NET: -1000 mL            VITALS  T(C): 37 (05-01-22 @ 04:56), Max: 37.9 (04-30-22 @ 20:13)  HR: 98 (05-01-22 @ 04:56) (57 - 102)  BP: 128/60 (05-01-22 @ 04:56) (107/62 - 167/64)  RR: 17 (05-01-22 @ 04:56) (17 - 18)  SpO2: 92% (05-01-22 @ 04:56) (92% - 97%)    Constitutional: well developed, normal appearance, well groomed, well nourished, no deformities and no acute distress.   Eyes: the conjunctiva exhibited no abnormalities and the eyelids demonstrated no xanthelasmas.   HEENT: normal oral mucosa, no oral pallor and no oral cyanosis.   Neck: normal jugular venous A waves present, normal jugular venous V waves present and no jugular venous wilson A waves.   Pulmonary: no respiratory distress, normal respiratory rhythm and effort, no accessory muscle use and lungs were clear to auscultation bilaterally.   Cardiovascular: heart rate and rhythm were normal, normal S1 and S2 and no murmur, gallop, rub, heave or thrill are present.   Abdomen: soft, non-tender, no hepato-splenomegaly and no abdominal mass palpated.   Musculoskeletal: the gait could not be assessed..   Extremities: no clubbing of the fingernails, no localized cyanosis, no petechial hemorrhages and no ischemic changes.   Skin: normal skin color and pigmentation, no rash, no venous stasis, no skin lesions, no skin ulcer and no xanthoma was observed.   Psychiatric: oriented to person, place, and time, the affect was normal, the mood was normal and not feeling anxious.     LABS:   --------  04-29    133<L>  |  97  |  33<H>  ----------------------------<  210<H>  4.5   |  28  |  3.90<H>    Ca    9.6      29 Apr 2022 08:48                           9.7    11.91 )-----------( 335      ( 29 Apr 2022 08:48 )             31.2                 RADIOLOGY:  -----------------    ECG:     ECHO:

## 2022-05-01 NOTE — PROGRESS NOTE ADULT - SUBJECTIVE AND OBJECTIVE BOX
Date/Time Patient Seen:  		  Referring MD:   Data Reviewed	       Patient is a 71y old  Female who presents with a chief complaint of falls ,weakness (01 May 2022 07:41)      Subjective/HPI     PAST MEDICAL & SURGICAL HISTORY:  Diabetes mellitus II    HTN (hypertension)    h/o Anxiety attack    Depression    h/o Myocardial infarct 2007    CAD (coronary artery disease)    CAD (coronary artery disease)    h/o Hepatitis A 1969  currently resolved, no symptoms    PAD (peripheral artery disease)    Murmur, cardiac    h/o Smoking  quitted 3/2012    CRF (chronic renal failure), unspecified stage    Dialysis patient    Anemia secondary to renal failure    HTN (hypertension)    coronary stent 2007    s/p Ovarian cyst removal    s/p surgical removal of benign Skin lesion epigastric area          Medication list         MEDICATIONS  (STANDING):  aspirin enteric coated 81 milliGRAM(s) Oral daily  atorvastatin 40 milliGRAM(s) Oral at bedtime  calcium acetate 667 milliGRAM(s) Oral three times a day with meals  ceFAZolin   IVPB 1000 milliGRAM(s) IV Intermittent every 24 hours  cloNIDine 0.1 milliGRAM(s) Oral every 12 hours  clopidogrel Tablet 75 milliGRAM(s) Oral daily  dextrose 5%. 1000 milliLiter(s) (100 mL/Hr) IV Continuous <Continuous>  dextrose 5%. 1000 milliLiter(s) (50 mL/Hr) IV Continuous <Continuous>  dextrose 50% Injectable 25 Gram(s) IV Push once  dextrose 50% Injectable 12.5 Gram(s) IV Push once  dextrose 50% Injectable 25 Gram(s) IV Push once  diltiazem    Tablet 60 milliGRAM(s) Oral every 8 hours  epoetin fawad-epbx (RETACRIT) Injectable 73228 Unit(s) IV Push <User Schedule>  glucagon  Injectable 1 milliGRAM(s) IntraMuscular once  glucagon  Injectable 1 milliGRAM(s) IntraMuscular once  heparin   Injectable 5000 Unit(s) SubCutaneous every 12 hours  imipramine 50 milliGRAM(s) Oral daily  insulin glargine Injectable (LANTUS) 12 Unit(s) SubCutaneous at bedtime  insulin lispro (ADMELOG) corrective regimen sliding scale   SubCutaneous three times a day before meals  insulin lispro (ADMELOG) corrective regimen sliding scale   SubCutaneous at bedtime  lactobacillus acidophilus 1 Tablet(s) Oral two times a day  metoprolol tartrate 100 milliGRAM(s) Oral every 12 hours  multivitamin 1 Tablet(s) Oral daily  pantoprazole    Tablet 40 milliGRAM(s) Oral before breakfast  povidone iodine 10% Solution 1 Application(s) Topical daily  senna 2 Tablet(s) Oral at bedtime    MEDICATIONS  (PRN):  acetaminophen     Tablet .. 650 milliGRAM(s) Oral every 6 hours PRN Temp greater or equal to 38C (100.4F), Mild Pain (1 - 3)  aluminum hydroxide/magnesium hydroxide/simethicone Suspension 30 milliLiter(s) Oral every 4 hours PRN Dyspepsia  dextrose Oral Gel 15 Gram(s) Oral once PRN Blood Glucose LESS THAN 70 milliGRAM(s)/deciliter  melatonin 3 milliGRAM(s) Oral at bedtime PRN Insomnia  ondansetron Injectable 4 milliGRAM(s) IV Push every 8 hours PRN Nausea and/or Vomiting         Vitals log        ICU Vital Signs Last 24 Hrs  T(C): 37 (01 May 2022 04:56), Max: 37.9 (30 Apr 2022 20:13)  T(F): 98.6 (01 May 2022 04:56), Max: 100.3 (30 Apr 2022 20:13)  HR: 98 (01 May 2022 04:56) (57 - 102)  BP: 128/60 (01 May 2022 04:56) (107/62 - 167/64)  BP(mean): --  ABP: --  ABP(mean): --  RR: 17 (01 May 2022 04:56) (17 - 18)  SpO2: 92% (01 May 2022 04:56) (92% - 97%)           Input and Output:  I&O's Detail    30 Apr 2022 07:01  -  01 May 2022 07:00  --------------------------------------------------------  IN:  Total IN: 0 mL    OUT:    Other (mL): 1000 mL  Total OUT: 1000 mL    Total NET: -1000 mL          Lab Data                        9.7    11.91 )-----------( 335      ( 29 Apr 2022 08:48 )             31.2     04-29    133<L>  |  97  |  33<H>  ----------------------------<  210<H>  4.5   |  28  |  3.90<H>    Ca    9.6      29 Apr 2022 08:48              Review of Systems	      Objective     Physical Examination    heart s1s2  lung dec BS  abd soft      Pertinent Lab findings & Imaging      Dexter:  NO   Adequate UO     I&O's Detail    30 Apr 2022 07:01  -  01 May 2022 07:00  --------------------------------------------------------  IN:  Total IN: 0 mL    OUT:    Other (mL): 1000 mL  Total OUT: 1000 mL    Total NET: -1000 mL               Discussed with:     Cultures:	        Radiology

## 2022-05-01 NOTE — PROGRESS NOTE ADULT - SUBJECTIVE AND OBJECTIVE BOX
Neurology Follow up note    SHILO [id number]    HPI:  Patient came to ED because she  fell today on her way to dialysis, pt states that she is falling everyday and always feels weak. pt poor historian, missed dialysis today, will need admission for placement. Recent admission 04/05/22 -72yo female bib ems with sob, pt states she has missed the last 2 rounds of dialysis and is due today, and has been having worsening sob, no cough, fever, chills, no chest pain no other complaints .Found to have hyperkalemia Diagnosed with foot infection MRI showed evidence of acute osteomyelitis of L medial malleolus and distal aspect of R 1st toe ,poa . -ID recommended  cefazolin 2g-2g-3g with HD x 4 weeks .Patient was discharged home with home care and iv abx at HD center Palliative care consult requested ,to discuss advance directives and complete MOLST  (22 Apr 2022 16:22)      Interval History -doing better    Patient is seen, chart was reviewed and case was discussed with the treatment team.  Pt is not in any distress.   Lying on bed comfortably.   No events reported overnight.   No clinical seizure was reported.      Vital Signs Last 24 Hrs  T(C): 37 (01 May 2022 12:27), Max: 37.9 (30 Apr 2022 20:13)  T(F): 98.6 (01 May 2022 12:27), Max: 100.3 (30 Apr 2022 20:13)  HR: 98 (01 May 2022 12:27) (57 - 102)  BP: 148/82 (01 May 2022 12:27) (107/62 - 167/64)  BP(mean): --  RR: 18 (01 May 2022 12:27) (17 - 18)  SpO2: 95% (01 May 2022 12:27) (92% - 97%)        REVIEW OF SYSTEMS:    Constitutional: No fever,   Eyes: No eye pain, visual disturbances, or discharge  ENT:  No difficulty hearing, tinnitus, vertigo; No sinus or throat pain  Neck: No pain or stiffness  Respiratory: No cough, wheezing, chills or hemoptysis  Cardiovascular: No chest pain, palpitations, shortness of breath,  Gastrointestinal: No abdominal or epigastric pain. No nausea, vomiting or hematemesis;  Genitourinary: No dysuria, frequency, hematuria or incontinence  Neurological: No headaches,  Psychiatric: No depression, anxiety, mood swings or difficulty sleeping  Musculoskeletal: No joint pain or swelling;  Skin: No itching, burning, rashes or lesions   Lymph Nodes: No enlarged glands  Endocrine: No heat or cold intolerance; No hair loss  Allergy and Immunologic: No hives or eczema    On Neurological Examination:    Mental Status - Pt is alert, awake,. Follows commands well and able to answer questions appropriately.Mood and affect  normal    Speech -  Normal.     Cranial Nerves - Pupils 3 mm equal and reactive to light, extraocular eye movements intact. Pt has no visual field deficit.  Pt has no  facial asymmetry. Facial sensation is intact.Tongue - is in midline.    Muscle tone - is normal    Motor Exam - 5/5 of UE  LE 4/5   No drift. No shaking or tremors.    Sensory Exam - Pt withdraws all extremities equally on stimulation. No asymmetry seen.       coordination:    Finger to nose: normal  Deep tendon Reflexes - 2 plus all over.        Romberg - Negative.    Neck Supple -  Yes.     MEDICATIONS    acetaminophen     Tablet .. 650 milliGRAM(s) Oral every 6 hours PRN  aluminum hydroxide/magnesium hydroxide/simethicone Suspension 30 milliLiter(s) Oral every 4 hours PRN  aspirin enteric coated 81 milliGRAM(s) Oral daily  atorvastatin 40 milliGRAM(s) Oral at bedtime  calcium acetate 667 milliGRAM(s) Oral three times a day with meals  ceFAZolin   IVPB 1000 milliGRAM(s) IV Intermittent every 24 hours  cloNIDine 0.1 milliGRAM(s) Oral every 12 hours  clopidogrel Tablet 75 milliGRAM(s) Oral daily  dextrose 5%. 1000 milliLiter(s) IV Continuous <Continuous>  dextrose 5%. 1000 milliLiter(s) IV Continuous <Continuous>  dextrose 50% Injectable 25 Gram(s) IV Push once  dextrose 50% Injectable 12.5 Gram(s) IV Push once  dextrose 50% Injectable 25 Gram(s) IV Push once  dextrose Oral Gel 15 Gram(s) Oral once PRN  diltiazem    Tablet 60 milliGRAM(s) Oral every 8 hours  epoetin fawad-epbx (RETACRIT) Injectable 76954 Unit(s) IV Push <User Schedule>  glucagon  Injectable 1 milliGRAM(s) IntraMuscular once  glucagon  Injectable 1 milliGRAM(s) IntraMuscular once  heparin   Injectable 5000 Unit(s) SubCutaneous every 12 hours  imipramine 50 milliGRAM(s) Oral daily  insulin glargine Injectable (LANTUS) 12 Unit(s) SubCutaneous at bedtime  insulin lispro (ADMELOG) corrective regimen sliding scale   SubCutaneous three times a day before meals  insulin lispro (ADMELOG) corrective regimen sliding scale   SubCutaneous at bedtime  lactobacillus acidophilus 1 Tablet(s) Oral two times a day  melatonin 3 milliGRAM(s) Oral at bedtime PRN  metoprolol tartrate 100 milliGRAM(s) Oral every 12 hours  multivitamin 1 Tablet(s) Oral daily  ondansetron Injectable 4 milliGRAM(s) IV Push every 8 hours PRN  pantoprazole    Tablet 40 milliGRAM(s) Oral before breakfast  povidone iodine 10% Solution 1 Application(s) Topical daily  senna 2 Tablet(s) Oral at bedtime      Allergies    latex (Unknown)  No Known Drug Allergies    Intolerances        LABS:            Hemoglobin A1C:     Vitamin B12     RADIOLOGY    ASSESSMENT AND PLAN:      SEEN FOR ATAXIC GAIT AND FALL- MULTIFACTORIAL  INCLUDING PN  ESRD  OM OF FOOT    ANTIBIOTIC AS PER ID  Physical therapy evaluation.  OOB to chair/ambulation with assistance only.  Pain is accessed and addressed  Would continue to follow.

## 2022-05-01 NOTE — PROGRESS NOTE ADULT - SUBJECTIVE AND OBJECTIVE BOX
Patient is a 71y Female whom presented to the hospital with esrd on hd    PAST MEDICAL & SURGICAL HISTORY:  Diabetes mellitus II    HTN (hypertension)    h/o Anxiety attack    Depression    h/o Myocardial infarct 2007    CAD (coronary artery disease)    h/o Hepatitis A 1969  currently resolved, no symptoms    PAD (peripheral artery disease)    Murmur, cardiac    h/o Smoking  quitted 3/2012    CRF (chronic renal failure), unspecified stage    Dialysis patient    Anemia secondary to renal failure    HTN (hypertension)    coronary stent 2007    s/p Ovarian cyst removal    s/p surgical removal of benign Skin lesion epigastric area        MEDICATIONS  (STANDING):  aspirin enteric coated 81 milliGRAM(s) Oral daily  atorvastatin 40 milliGRAM(s) Oral at bedtime  calcium acetate 667 milliGRAM(s) Oral three times a day with meals  ceFAZolin   IVPB 1000 milliGRAM(s) IV Intermittent every 24 hours  cloNIDine 0.1 milliGRAM(s) Oral every 12 hours  clopidogrel Tablet 75 milliGRAM(s) Oral daily  dextrose 5%. 1000 milliLiter(s) (100 mL/Hr) IV Continuous <Continuous>  dextrose 5%. 1000 milliLiter(s) (50 mL/Hr) IV Continuous <Continuous>  dextrose 50% Injectable 25 Gram(s) IV Push once  dextrose 50% Injectable 12.5 Gram(s) IV Push once  dextrose 50% Injectable 25 Gram(s) IV Push once  diltiazem    Tablet 60 milliGRAM(s) Oral every 8 hours  glucagon  Injectable 1 milliGRAM(s) IntraMuscular once  heparin   Injectable 5000 Unit(s) SubCutaneous every 12 hours  imipramine 50 milliGRAM(s) Oral daily  insulin lispro (ADMELOG) corrective regimen sliding scale   SubCutaneous three times a day before meals  insulin lispro (ADMELOG) corrective regimen sliding scale   SubCutaneous at bedtime  lactobacillus acidophilus 1 Tablet(s) Oral two times a day  metoprolol tartrate 100 milliGRAM(s) Oral every 12 hours  multivitamin 1 Tablet(s) Oral daily  pantoprazole    Tablet 40 milliGRAM(s) Oral before breakfast  senna 2 Tablet(s) Oral at bedtime      Allergies    latex (Unknown)  No Known Drug Allergies    Intolerances        SOCIAL HISTORY:  Denies ETOh,Smoking,     FAMILY HISTORY:      REVIEW OF SYSTEMS:    CONSTITUTIONAL: No weakness, fevers or chills  RESPIRATORY: No cough, wheezing, hemoptysis; No shortness of breath  CARDIOVASCULAR: No chest pain or palpitations  GASTROINTESTINAL: No abdominal or epigastric pain. No nausea, vomiting,                                         CAPILLARY BLOOD GLUCOSE      POCT Blood Glucose.: 245 mg/dL (01 May 2022 12:43)  POCT Blood Glucose.: 246 mg/dL (01 May 2022 11:37)  POCT Blood Glucose.: 248 mg/dL (01 May 2022 08:13)  POCT Blood Glucose.: 270 mg/dL (30 Apr 2022 21:22)  POCT Blood Glucose.: 257 mg/dL (30 Apr 2022 16:43)      Vital Signs Last 24 Hrs  T(C): 37 (01 May 2022 12:27), Max: 37.9 (30 Apr 2022 20:13)  T(F): 98.6 (01 May 2022 12:27), Max: 100.3 (30 Apr 2022 20:13)  HR: 98 (01 May 2022 12:27) (62 - 102)  BP: 148/82 (01 May 2022 12:27) (107/62 - 167/64)  BP(mean): --  RR: 18 (01 May 2022 12:27) (17 - 18)  SpO2: 95% (01 May 2022 12:27) (92% - 95%)                                     PHYSICAL EXAM:    Constitutional: NAD  HEENT: conjunctive   clear   Neck:  No JVD  Respiratory: CTAB  Cardiovascular: S1 and S2  Gastrointestinal: BS+, soft, NT/ND  Extremities: No peripheral edema

## 2022-05-02 ENCOUNTER — TRANSCRIPTION ENCOUNTER (OUTPATIENT)
Age: 72
End: 2022-05-02

## 2022-05-02 VITALS
HEART RATE: 83 BPM | DIASTOLIC BLOOD PRESSURE: 75 MMHG | TEMPERATURE: 98 F | RESPIRATION RATE: 18 BRPM | OXYGEN SATURATION: 94 % | SYSTOLIC BLOOD PRESSURE: 162 MMHG

## 2022-05-02 DIAGNOSIS — E11.9 TYPE 2 DIABETES MELLITUS WITHOUT COMPLICATIONS: ICD-10-CM

## 2022-05-02 LAB
ANION GAP SERPL CALC-SCNC: 9 MMOL/L — SIGNIFICANT CHANGE UP (ref 5–17)
BUN SERPL-MCNC: 53 MG/DL — HIGH (ref 7–23)
CALCIUM SERPL-MCNC: 10.5 MG/DL — HIGH (ref 8.5–10.1)
CHLORIDE SERPL-SCNC: 94 MMOL/L — LOW (ref 96–108)
CO2 SERPL-SCNC: 30 MMOL/L — SIGNIFICANT CHANGE UP (ref 22–31)
CREAT SERPL-MCNC: 5.2 MG/DL — HIGH (ref 0.5–1.3)
EGFR: 8 ML/MIN/1.73M2 — LOW
GLUCOSE SERPL-MCNC: 235 MG/DL — HIGH (ref 70–99)
HCT VFR BLD CALC: 31.6 % — LOW (ref 34.5–45)
HGB BLD-MCNC: 9.7 G/DL — LOW (ref 11.5–15.5)
MCHC RBC-ENTMCNC: 30.7 GM/DL — LOW (ref 32–36)
MCHC RBC-ENTMCNC: 31 PG — SIGNIFICANT CHANGE UP (ref 27–34)
MCV RBC AUTO: 101 FL — HIGH (ref 80–100)
NRBC # BLD: 0 /100 WBCS — SIGNIFICANT CHANGE UP (ref 0–0)
PLATELET # BLD AUTO: 362 K/UL — SIGNIFICANT CHANGE UP (ref 150–400)
POTASSIUM SERPL-MCNC: 4.4 MMOL/L — SIGNIFICANT CHANGE UP (ref 3.5–5.3)
POTASSIUM SERPL-SCNC: 4.4 MMOL/L — SIGNIFICANT CHANGE UP (ref 3.5–5.3)
RBC # BLD: 3.13 M/UL — LOW (ref 3.8–5.2)
RBC # FLD: 18.9 % — HIGH (ref 10.3–14.5)
SARS-COV-2 RNA SPEC QL NAA+PROBE: SIGNIFICANT CHANGE UP
SODIUM SERPL-SCNC: 133 MMOL/L — LOW (ref 135–145)
WBC # BLD: 11.89 K/UL — HIGH (ref 3.8–10.5)
WBC # FLD AUTO: 11.89 K/UL — HIGH (ref 3.8–10.5)

## 2022-05-02 PROCEDURE — 82962 GLUCOSE BLOOD TEST: CPT

## 2022-05-02 PROCEDURE — 97116 GAIT TRAINING THERAPY: CPT

## 2022-05-02 PROCEDURE — 85652 RBC SED RATE AUTOMATED: CPT

## 2022-05-02 PROCEDURE — 87635 SARS-COV-2 COVID-19 AMP PRB: CPT

## 2022-05-02 PROCEDURE — 85025 COMPLETE CBC W/AUTO DIFF WBC: CPT

## 2022-05-02 PROCEDURE — 73610 X-RAY EXAM OF ANKLE: CPT

## 2022-05-02 PROCEDURE — 36415 COLL VENOUS BLD VENIPUNCTURE: CPT

## 2022-05-02 PROCEDURE — 85027 COMPLETE CBC AUTOMATED: CPT

## 2022-05-02 PROCEDURE — 73630 X-RAY EXAM OF FOOT: CPT

## 2022-05-02 PROCEDURE — 80053 COMPREHEN METABOLIC PANEL: CPT

## 2022-05-02 PROCEDURE — 80048 BASIC METABOLIC PNL TOTAL CA: CPT

## 2022-05-02 PROCEDURE — 70450 CT HEAD/BRAIN W/O DYE: CPT | Mod: MA

## 2022-05-02 PROCEDURE — 82607 VITAMIN B-12: CPT

## 2022-05-02 PROCEDURE — U0005: CPT

## 2022-05-02 PROCEDURE — 83036 HEMOGLOBIN GLYCOSYLATED A1C: CPT

## 2022-05-02 PROCEDURE — 72170 X-RAY EXAM OF PELVIS: CPT

## 2022-05-02 PROCEDURE — 97161 PT EVAL LOW COMPLEX 20 MIN: CPT

## 2022-05-02 PROCEDURE — 84446 ASSAY OF VITAMIN E: CPT

## 2022-05-02 PROCEDURE — 93010 ELECTROCARDIOGRAM REPORT: CPT

## 2022-05-02 PROCEDURE — 80074 ACUTE HEPATITIS PANEL: CPT

## 2022-05-02 PROCEDURE — 93005 ELECTROCARDIOGRAM TRACING: CPT

## 2022-05-02 PROCEDURE — 86140 C-REACTIVE PROTEIN: CPT

## 2022-05-02 PROCEDURE — 71045 X-RAY EXAM CHEST 1 VIEW: CPT

## 2022-05-02 PROCEDURE — 99261: CPT

## 2022-05-02 PROCEDURE — 73620 X-RAY EXAM OF FOOT: CPT

## 2022-05-02 PROCEDURE — U0003: CPT

## 2022-05-02 PROCEDURE — 83605 ASSAY OF LACTIC ACID: CPT

## 2022-05-02 PROCEDURE — 87040 BLOOD CULTURE FOR BACTERIA: CPT

## 2022-05-02 PROCEDURE — 99221 1ST HOSP IP/OBS SF/LOW 40: CPT

## 2022-05-02 PROCEDURE — 84443 ASSAY THYROID STIM HORMONE: CPT

## 2022-05-02 PROCEDURE — 97530 THERAPEUTIC ACTIVITIES: CPT

## 2022-05-02 PROCEDURE — 83690 ASSAY OF LIPASE: CPT

## 2022-05-02 PROCEDURE — 99285 EMERGENCY DEPT VISIT HI MDM: CPT

## 2022-05-02 PROCEDURE — 80076 HEPATIC FUNCTION PANEL: CPT

## 2022-05-02 PROCEDURE — 82746 ASSAY OF FOLIC ACID SERUM: CPT

## 2022-05-02 RX ADMIN — Medication 81 MILLIGRAM(S): at 11:46

## 2022-05-02 RX ADMIN — Medication 100 MILLIGRAM(S): at 05:21

## 2022-05-02 RX ADMIN — Medication 667 MILLIGRAM(S): at 11:46

## 2022-05-02 RX ADMIN — Medication 1 TABLET(S): at 05:21

## 2022-05-02 RX ADMIN — Medication 60 MILLIGRAM(S): at 05:25

## 2022-05-02 RX ADMIN — Medication 6: at 11:50

## 2022-05-02 RX ADMIN — CLOPIDOGREL BISULFATE 75 MILLIGRAM(S): 75 TABLET, FILM COATED ORAL at 11:46

## 2022-05-02 RX ADMIN — Medication 60 MILLIGRAM(S): at 14:44

## 2022-05-02 RX ADMIN — HEPARIN SODIUM 5000 UNIT(S): 5000 INJECTION INTRAVENOUS; SUBCUTANEOUS at 05:23

## 2022-05-02 RX ADMIN — Medication 6: at 07:45

## 2022-05-02 RX ADMIN — Medication 1 TABLET(S): at 11:46

## 2022-05-02 RX ADMIN — Medication 50 MILLIGRAM(S): at 11:46

## 2022-05-02 RX ADMIN — Medication 0.1 MILLIGRAM(S): at 07:45

## 2022-05-02 RX ADMIN — PANTOPRAZOLE SODIUM 40 MILLIGRAM(S): 20 TABLET, DELAYED RELEASE ORAL at 05:21

## 2022-05-02 RX ADMIN — Medication 667 MILLIGRAM(S): at 07:45

## 2022-05-02 RX ADMIN — Medication 1 APPLICATION(S): at 11:47

## 2022-05-02 NOTE — CONSULT NOTE ADULT - SUBJECTIVE AND OBJECTIVE BOX
Patient is a 71y old  Female who presents with a chief complaint of falls ,weakness (02 May 2022 09:04)    Type:2 DX x 25 years. a1c 8.1%  known complications: hx DFU  Endocrine: dr Ilana Kirk.  Rx home: Tresiba 15 units HS and Fiasp 4 units AC x3. denies hypoglycemia. No Hx DKA/HHS. Glucometer checks- uses AC x3- denies any needs for insulin/supplies. DSC plan [facility]. diabetes education provided  Hx +ASCVD, + CKD/ HD, no HF    HPI:  Patient came to ED because she  fell today on her way to dialysis, pt states that she is falling everyday and always feels weak. pt poor historian, missed dialysis today, will need admission for placement. Recent admission 04/05/22 -72yo female bib ems with sob, pt states she has missed the last 2 rounds of dialysis and is due today, and has been having worsening sob, no cough, fever, chills, no chest pain no other complaints .Found to have hyperkalemia Diagnosed with foot infection MRI showed evidence of acute osteomyelitis of L medial malleolus and distal aspect of R 1st toe ,poa . -ID recommended  cefazolin 2g-2g-3g with HD x 4 weeks .Patient was discharged home with home care and iv abx at HD center Palliative care consult requested ,to discuss advance directives and complete MOLST  (22 Apr 2022 16:22)      PAST MEDICAL & SURGICAL HISTORY:  Diabetes mellitus II    HTN (hypertension)    h/o Anxiety attack    Depression    h/o Myocardial infarct 2007    CAD (coronary artery disease)    h/o Hepatitis A 1969  currently resolved, no symptoms    PAD (peripheral artery disease)    Murmur, cardiac    h/o Smoking  quitted 3/2012    CRF (chronic renal failure), unspecified stage    Dialysis patient    Anemia secondary to renal failure    HTN (hypertension)    coronary stent 2007    s/p Ovarian cyst removal    s/p surgical removal of benign Skin lesion epigastric area        REVIEW OF SYSTEMS  General:	as above  Respiratory: NAD, No SOB, no cough  Cardiovascular: No chest pain, no palpitations	  Endocrine: no polyuria, no polydipsia, or S/S of hypoglycemia        Allergies    latex (Unknown)  No Known Drug Allergies    Intolerances        MEDICATIONS  (STANDING):  aspirin enteric coated 81 milliGRAM(s) Oral daily  atorvastatin 40 milliGRAM(s) Oral at bedtime  calcium acetate 667 milliGRAM(s) Oral three times a day with meals  ceFAZolin   IVPB 1000 milliGRAM(s) IV Intermittent every 24 hours  cloNIDine 0.1 milliGRAM(s) Oral every 12 hours  clopidogrel Tablet 75 milliGRAM(s) Oral daily  dextrose 5%. 1000 milliLiter(s) (100 mL/Hr) IV Continuous <Continuous>  dextrose 5%. 1000 milliLiter(s) (50 mL/Hr) IV Continuous <Continuous>  dextrose 50% Injectable 25 Gram(s) IV Push once  dextrose 50% Injectable 12.5 Gram(s) IV Push once  dextrose 50% Injectable 25 Gram(s) IV Push once  diltiazem    Tablet 60 milliGRAM(s) Oral every 8 hours  epoetin fawad-epbx (RETACRIT) Injectable 66969 Unit(s) IV Push <User Schedule>  glucagon  Injectable 1 milliGRAM(s) IntraMuscular once  glucagon  Injectable 1 milliGRAM(s) IntraMuscular once  heparin   Injectable 5000 Unit(s) SubCutaneous every 12 hours  imipramine 50 milliGRAM(s) Oral daily  insulin glargine Injectable (LANTUS) 12 Unit(s) SubCutaneous at bedtime  insulin lispro (ADMELOG) corrective regimen sliding scale   SubCutaneous three times a day before meals  insulin lispro (ADMELOG) corrective regimen sliding scale   SubCutaneous at bedtime  lactobacillus acidophilus 1 Tablet(s) Oral two times a day  metoprolol tartrate 100 milliGRAM(s) Oral every 12 hours  multivitamin 1 Tablet(s) Oral daily  pantoprazole    Tablet 40 milliGRAM(s) Oral before breakfast  povidone iodine 10% Solution 1 Application(s) Topical daily  senna 2 Tablet(s) Oral at bedtime

## 2022-05-02 NOTE — PROGRESS NOTE ADULT - PROBLEM SELECTOR PROBLEM 2
Chronic osteomyelitis
Acute osteomyelitis
Acute osteomyelitis
Chronic osteomyelitis
Acute osteomyelitis
Chronic osteomyelitis
Acute osteomyelitis
Acute osteomyelitis
DM (diabetes mellitus)
Acute osteomyelitis
Acute osteomyelitis

## 2022-05-02 NOTE — PROGRESS NOTE ADULT - SUBJECTIVE AND OBJECTIVE BOX
Neurology Follow up note    SHILO [id number]    HPI:  Patient came to ED because she  fell today on her way to dialysis, pt states that she is falling everyday and always feels weak. pt poor historian, missed dialysis today, will need admission for placement. Recent admission 04/05/22 -70yo female bib ems with sob, pt states she has missed the last 2 rounds of dialysis and is due today, and has been having worsening sob, no cough, fever, chills, no chest pain no other complaints .Found to have hyperkalemia Diagnosed with foot infection MRI showed evidence of acute osteomyelitis of L medial malleolus and distal aspect of R 1st toe ,poa . -ID recommended  cefazolin 2g-2g-3g with HD x 4 weeks .Patient was discharged home with home care and iv abx at HD center Palliative care consult requested ,to discuss advance directives and complete MOLST  (22 Apr 2022 16:22)      Interval History -no new events    Patient is seen, chart was reviewed and case was discussed with the treatment team.  Pt is not in any distress.   Lying on bed comfortably.       Vital Signs Last 24 Hrs  T(C): 36.8 (02 May 2022 05:08), Max: 37.2 (01 May 2022 20:07)  T(F): 98.2 (02 May 2022 05:08), Max: 98.9 (01 May 2022 20:07)  HR: 96 (02 May 2022 05:08) (72 - 96)  BP: 131/66 (02 May 2022 05:08) (115/54 - 131/66)  BP(mean): --  RR: 18 (02 May 2022 05:08) (18 - 18)  SpO2: 94% (02 May 2022 05:08) (94% - 94%)      REVIEW OF SYSTEMS:    Constitutional: No fever,   Eyes: No eye pain, visual disturbances, or discharge  ENT:  No difficulty hearing, tinnitus, vertigo; No sinus or throat pain  Neck: No pain or stiffness  Respiratory: No cough, wheezing, chills or hemoptysis  Cardiovascular: No chest pain, palpitations, shortness of breath,  Gastrointestinal: No abdominal or epigastric pain. No nausea, vomiting or hematemesis;  Genitourinary: No dysuria, frequency, hematuria or incontinence  Neurological: No headaches,  Psychiatric: No depression, anxiety, mood swings or difficulty sleeping  Musculoskeletal: No joint pain or swelling;  Skin: No itching, burning, rashes or lesions   Lymph Nodes: No enlarged glands  Endocrine: No heat or cold intolerance; No hair loss  Allergy and Immunologic: No hives or eczema    On Neurological Examination:    Mental Status - Pt is alert, awake,. Follows commands well and able to answer questions appropriately.Mood and affect  normal    Speech -  Normal.     Cranial Nerves - Pupils 3 mm equal and reactive to light, extraocular eye movements intact. Pt has no visual field deficit.  Pt has no  facial asymmetry. Facial sensation is intact.Tongue - is in midline.    Muscle tone - is normal    Motor Exam - 5/5 of UE  LE 4/5   No drift. No shaking or tremors.    Sensory Exam - Pt withdraws all extremities equally on stimulation. No asymmetry seen.       coordination:    Finger to nose: normal  Deep tendon Reflexes - 2 plus all over.        Romberg - Negative.    Neck Supple -  Yes.     MEDICATIONS    acetaminophen     Tablet .. 650 milliGRAM(s) Oral every 6 hours PRN  aluminum hydroxide/magnesium hydroxide/simethicone Suspension 30 milliLiter(s) Oral every 4 hours PRN  aspirin enteric coated 81 milliGRAM(s) Oral daily  atorvastatin 40 milliGRAM(s) Oral at bedtime  calcium acetate 667 milliGRAM(s) Oral three times a day with meals  ceFAZolin   IVPB 1000 milliGRAM(s) IV Intermittent every 24 hours  cloNIDine 0.1 milliGRAM(s) Oral every 12 hours  clopidogrel Tablet 75 milliGRAM(s) Oral daily  dextrose 5%. 1000 milliLiter(s) IV Continuous <Continuous>  dextrose 5%. 1000 milliLiter(s) IV Continuous <Continuous>  dextrose 50% Injectable 25 Gram(s) IV Push once  dextrose 50% Injectable 12.5 Gram(s) IV Push once  dextrose 50% Injectable 25 Gram(s) IV Push once  dextrose Oral Gel 15 Gram(s) Oral once PRN  diltiazem    Tablet 60 milliGRAM(s) Oral every 8 hours  epoetin fawad-epbx (RETACRIT) Injectable 16315 Unit(s) IV Push <User Schedule>  glucagon  Injectable 1 milliGRAM(s) IntraMuscular once  glucagon  Injectable 1 milliGRAM(s) IntraMuscular once  heparin   Injectable 5000 Unit(s) SubCutaneous every 12 hours  imipramine 50 milliGRAM(s) Oral daily  insulin glargine Injectable (LANTUS) 12 Unit(s) SubCutaneous at bedtime  insulin lispro (ADMELOG) corrective regimen sliding scale   SubCutaneous three times a day before meals  insulin lispro (ADMELOG) corrective regimen sliding scale   SubCutaneous at bedtime  lactobacillus acidophilus 1 Tablet(s) Oral two times a day  melatonin 3 milliGRAM(s) Oral at bedtime PRN  metoprolol tartrate 100 milliGRAM(s) Oral every 12 hours  multivitamin 1 Tablet(s) Oral daily  ondansetron Injectable 4 milliGRAM(s) IV Push every 8 hours PRN  pantoprazole    Tablet 40 milliGRAM(s) Oral before breakfast  povidone iodine 10% Solution 1 Application(s) Topical daily  senna 2 Tablet(s) Oral at bedtime      Allergies    latex (Unknown)  No Known Drug Allergies    Intolerances        LABS:    05-02    133<L>  |  94<L>  |  53<H>  ----------------------------<  235<H>  4.4   |  30  |  5.20<H>    Ca    10.5<H>      02 May 2022 07:43            Hemoglobin A1C:     Vitamin B12     RADIOLOGY    ASSESSMENT AND PLAN:      SEEN FOR ATAXIC GAIT AND FALL- MULTIFACTORIAL  INCLUDING PN  ESRD  OM OF FOOT    No further neuro w/u  ANTIBIOTIC AS PER ID  Physical therapy evaluation.  OOB to chair/ambulation with assistance only.  Pain is accessed and addressed  Would continue to follow.

## 2022-05-02 NOTE — PROGRESS NOTE ADULT - PROBLEM SELECTOR PROBLEM 4
Atrial fibrillation
ESRD on dialysis
Atrial fibrillation

## 2022-05-02 NOTE — PROGRESS NOTE ADULT - PROBLEM SELECTOR PROBLEM 8
Non-compliant patient

## 2022-05-02 NOTE — PROGRESS NOTE ADULT - PROBLEM SELECTOR PROBLEM 6
ESRD on dialysis
Atrial fibrillation
ESRD on dialysis

## 2022-05-02 NOTE — DISCHARGE NOTE NURSING/CASE MANAGEMENT/SOCIAL WORK - NSDCPEFALRISK_GEN_ALL_CORE
For information on Fall & Injury Prevention, visit: [email or web address] OR  [email or web address] OR  https://www.cdc.gov/steadi/patient.html

## 2022-05-02 NOTE — PROGRESS NOTE ADULT - ASSESSMENT
Patient came to ED because she  fell today on her way to dialysis, pt states that she is falling everyday and always feels weak. pt poor historian, missed dialysis today, will need admission for placement. Recent admission 04/05/22    esrd  dyspnea  poor compliance  OA  OP  PUD  OM  DM  Anemia    neuro f/u noted  remains on abx  VS noted  labs reviewed  psych and CM notes reviewed -     retacrit on order  on and off o2 support  vs noted  labs reviewed  on ABX for OM  Renal f/u - HD as per renal team  old records reviewed  GOC discussion, documented, full code - HCP -   Dry Gangrene  
Patient came to ED because she  fell today on her way to dialysis, pt states that she is falling everyday and always feels weak. pt poor historian, missed dialysis today, will need admission for placement. Recent admission 04/05/22    esrd  dyspnea  poor compliance  OA  OP  PUD  OM  DM  Anemia    retacrit on order  on and off o2 support  vs noted  labs reviewed  on ABX for OM  Renal f/u - HD as per renal team  old records reviewed  GOC discussion, documented, full code - HCP -   Dry Gangrene  
Patient came to ED because she  fell today on her way to dialysis, pt states that she is falling everyday and always feels weak. pt poor historian, missed dialysis today, will need admission for placement. Recent admission 04/05/22    esrd  dyspnea  poor compliance  OA  OP  PUD  OM  DM  Anemia    retacrit on order  on and off o2 support  vs noted  labs reviewed  on ABX for OM  Renal f/u - HD as per renal team  old records reviewed  GOC discussion, documented, full code - HCP -   Dry Gangrene      
Patient came to ED because she  fell today on her way to dialysis, pt states that she is falling everyday and always feels weak. pt poor historian, missed dialysis today, will need admission for placement. Recent admission 04/05/22 -70yo female bib ems with sob, pt states she has missed the last 2 rounds of dialysis and is due today, and has been having worsening sob, no cough, fever, chills, no chest pain no other complaints .Found to have hyperkalemia Diagnosed with foot infection MRI showed evidence of acute osteomyelitis of L medial malleolus and distal aspect of R 1st toe ,poa . -ID recommended  cefazolin 2g-2g-3g with HD x 4 weeks .Patient was discharged home with home care and iv abx at HD center Palliative care consult requested ,to discuss advance directives and complete MOLST 
Patient came to ED because she  fell today on her way to dialysis, pt states that she is falling everyday and always feels weak. pt poor historian, missed dialysis today, will need admission for placement. Recent admission 04/05/22 -70yo female bib ems with sob, pt states she has missed the last 2 rounds of dialysis and is due today, and has been having worsening sob, no cough, fever, chills, no chest pain no other complaints .Found to have hyperkalemia Diagnosed with foot infection MRI showed evidence of acute osteomyelitis of L medial malleolus and distal aspect of R 1st toe ,poa . -ID recommended  cefazolin 2g-2g-3g with HD x 4 weeks .Patient was discharged home with home care and iv abx at HD center Palliative care consult requested ,to discuss advance directives and complete MOLST 
Patient came to ED because she  fell today on her way to dialysis, pt states that she is falling everyday and always feels weak. pt poor historian, missed dialysis today, will need admission for placement. Recent admission 04/05/22 -70yo female bib ems with sob, pt states she has missed the last 2 rounds of dialysis and is due today, and has been having worsening sob, no cough, fever, chills, no chest pain no other complaints .Found to have hyperkalemia Diagnosed with foot infection MRI showed evidence of acute osteomyelitis of L medial malleolus and distal aspect of R 1st toe ,poa . -ID recommended  cefazolin 2g-2g-3g with HD x 4 weeks .Patient was discharged home with home care and iv abx at HD center Palliative care consult requested ,to discuss advance directives and complete MOLST  (22 Apr 2022 16:22)        esrd plan for hd tues thurs sat   Excess fluids and waste products will be removed from your blood; your electrolytes will be balanced; your blood pressure will be controlled.  ceFAZolin   IVPB 1000 milliGRAM(s) IV Intermittent every 24 hours  cloNIDine 0.1 milliGRAM(s) Oral every 12 hours  clopidogrel Tablet 75 milliGRAM(s) Oral daily    ANEMIA PLAN:  Anemia of chronic disease:  We will continue epo aiming for a HCT of 32-36 %.   We will monitor Iron stores, B12 and RBC folate .    BP monitoring,continue current antihypertensive meds, low salt diet,followup
Patient came to ED because she  fell today on her way to dialysis, pt states that she is falling everyday and always feels weak. pt poor historian, missed dialysis today, will need admission for placement. Recent admission 04/05/22 -72yo female bib ems with sob, pt states she has missed the last 2 rounds of dialysis and is due today, and has been having worsening sob, no cough, fever, chills, no chest pain no other complaints .Found to have hyperkalemia Diagnosed with foot infection MRI showed evidence of acute osteomyelitis of L medial malleolus and distal aspect of R 1st toe ,poa . -ID recommended  cefazolin 2g-2g-3g with HD x 4 weeks .Patient was discharged home with home care and iv abx at HD center Palliative care consult requested ,to discuss advance directives and complete MOLST  (22 Apr 2022 16:22)        esrd plan for hd tues thurs sat   Excess fluids and waste products will be removed from your blood; your electrolytes will be balanced; your blood pressure will be controlled.  ceFAZolin   IVPB 1000 milliGRAM(s) IV Intermittent every 24 hours  cloNIDine 0.1 milliGRAM(s) Oral every 12 hours  clopidogrel Tablet 75 milliGRAM(s) Oral daily    ANEMIA PLAN:  Anemia of chronic disease:  Well controlled by Aranesp  H and H subtherapeutic .  We will continue Aranesp aiming for a HCT of 32-36 %.   We will monitor Iron stores, B12 and RBC folate .    BP monitoring,continue current antihypertensive meds, low salt diet,followup
Patient came to ED because she  fell today on her way to dialysis, pt states that she is falling everyday and always feels weak. pt poor historian, missed dialysis today, will need admission for placement. Recent admission 04/05/22 -72yo female bib ems with sob, pt states she has missed the last 2 rounds of dialysis and is due today, and has been having worsening sob, no cough, fever, chills, no chest pain no other complaints .Found to have hyperkalemia Diagnosed with foot infection MRI showed evidence of acute osteomyelitis of L medial malleolus and distal aspect of R 1st toe ,poa . -ID recommended  cefazolin 2g-2g-3g with HD x 4 weeks .Patient was discharged home with home care and iv abx at HD center Palliative care consult requested ,to discuss advance directives and complete MOLST  (22 Apr 2022 16:22)        esrd plan for hd tues thurs sat   Excess fluids and waste products will be removed from your blood; your electrolytes will be balanced; your blood pressure will be controlled.  ceFAZolin   IVPB 1000 milliGRAM(s) IV Intermittent every 24 hours  cloNIDine 0.1 milliGRAM(s) Oral every 12 hours  clopidogrel Tablet 75 milliGRAM(s) Oral daily    ANEMIA PLAN:  Anemia of chronic disease:  Well controlled by Aranesp  H and H subtherapeutic .  We will continue Aranesp aiming for a HCT of 32-36 %.   We will monitor Iron stores, B12 and RBC folate .    BP monitoring,continue current antihypertensive meds, low salt diet,followup
Right  hallux and 2nd digit gangrenous changes  Left ankle medial malleolus stable scab 
s/ p falls  esrd - on hd  missed hd- - admitted for dialysis  ashd s/p coronary stent  s/p cabg  chf  dm2  dyslipidemia  pvd- s/p  surgery   paf- not a good candidate for oac - frequent falls 4/23
s/ p falls  esrd - on hd  missed hd- - admitted for dialysis  ashd s/p coronary stent  s/p cabg  chf  dm2  dyslipidemia  pvd- s/p  surgery   paf- not a good candidate for oac - frequent falls 4/23  discharge to [facility] when bed available 5/2
72yo female with hx of ESRD on HD, PVD s/p L femoral bypass, who presented after fall. Recently treated for possible pneumonia, but also found to have osteomyelitis of L medial malleolus and distal aspect of R 1st toe on MRI. Discharged on cefazolin with HD x 4 weeks. Tissue cultures from 3/30/22 grew klebsiella oxytoca/raoutella oxytoca, E. coli, MSSA.    No obvious signs of surrounding cellulitis or wound infection noted. Patient remains afebrile and leukocytosis improved, WBC down to 11. Blood cultures currently no growth.    -continue cefazolin 1g IV q24h while inpatient, when ready for discharge can switch back to 2g-2g-3g with HD until May 10  -recommend weekly CBC with diff, CMP upon discharge  -wound care  -no ID contraindication to discharge  -can [facility] prior to finishing antibiotic course    Lupe Tsai MD  Division of Infectious Diseases   Cell [phone] between 8am and 6pm   After 6pm and weekends please call ID service at [phone].   
Patient came to ED because she  fell today on her way to dialysis, pt states that she is falling everyday and always feels weak. pt poor historian, missed dialysis today, will need admission for placement. Recent admission 04/05/22    esrd  dyspnea  poor compliance  OA  OP  PUD  OM  DM  Anemia    neuro f/u noted  remains on abx  VS noted, on ROOM AIR  labs reviewed  psych and CM notes reviewed -     retacrit on order  vs noted  labs reviewed  on ABX for OM  Renal f/u - HD as per renal team  old records reviewed  GOC discussion, documented, full code - HCP -   Dry Gangrene  
Patient came to ED because she  fell today on her way to dialysis, pt states that she is falling everyday and always feels weak. pt poor historian, missed dialysis today, will need admission for placement. Recent admission 04/05/22 -70yo female bib ems with sob, pt states she has missed the last 2 rounds of dialysis and is due today, and has been having worsening sob, no cough, fever, chills, no chest pain no other complaints .Found to have hyperkalemia Diagnosed with foot infection MRI showed evidence of acute osteomyelitis of L medial malleolus and distal aspect of R 1st toe ,poa . -ID recommended  cefazolin 2g-2g-3g with HD x 4 weeks .Patient was discharged home with home care and iv abx at HD center Palliative care consult requested ,to discuss advance directives and complete MOLST  (22 Apr 2022 16:22)        esrd plan for hd tues thurs sat   Excess fluids and waste products will be removed from your blood; your electrolytes will be balanced; your blood pressure will be controlled.  ceFAZolin   IVPB 1000 milliGRAM(s) IV Intermittent every 24 hours  cloNIDine 0.1 milliGRAM(s) Oral every 12 hours  clopidogrel Tablet 75 milliGRAM(s) Oral daily    ANEMIA PLAN:  Anemia of chronic disease:  We will continue epo aiming for a HCT of 32-36 %.   We will monitor Iron stores, B12 and RBC folate .    BP monitoring,continue current antihypertensive meds, low salt diet,followup
Patient came to ED because she  fell today on her way to dialysis, pt states that she is falling everyday and always feels weak. pt poor historian, missed dialysis today, will need admission for placement. Recent admission 04/05/22 -70yo female bib ems with sob, pt states she has missed the last 2 rounds of dialysis and is due today, and has been having worsening sob, no cough, fever, chills, no chest pain no other complaints .Found to have hyperkalemia Diagnosed with foot infection MRI showed evidence of acute osteomyelitis of L medial malleolus and distal aspect of R 1st toe ,poa . -ID recommended  cefazolin 2g-2g-3g with HD x 4 weeks .Patient was discharged home with home care and iv abx at HD center Palliative care consult requested ,to discuss advance directives and complete MOLST  (22 Apr 2022 16:22)        esrd plan for hd tues thurs sat   Excess fluids and waste products will be removed from your blood; your electrolytes will be balanced; your blood pressure will be controlled.  ceFAZolin   IVPB 1000 milliGRAM(s) IV Intermittent every 24 hours  cloNIDine 0.1 milliGRAM(s) Oral every 12 hours  clopidogrel Tablet 75 milliGRAM(s) Oral daily    ANEMIA PLAN:  Anemia of chronic disease:  Well controlled by Aranesp  H and H subtherapeutic .  We will continue Aranesp aiming for a HCT of 32-36 %.   We will monitor Iron stores, B12 and RBC folate .    BP monitoring,continue current antihypertensive meds, low salt diet,followup
Patient came to ED because she  fell today on her way to dialysis, pt states that she is falling everyday and always feels weak. pt poor historian, missed dialysis today, will need admission for placement. Recent admission 04/05/22 -72yo female bib ems with sob, pt states she has missed the last 2 rounds of dialysis and is due today, and has been having worsening sob, no cough, fever, chills, no chest pain no other complaints .Found to have hyperkalemia Diagnosed with foot infection MRI showed evidence of acute osteomyelitis of L medial malleolus and distal aspect of R 1st toe ,poa . -ID recommended  cefazolin 2g-2g-3g with HD x 4 weeks .Patient was discharged home with home care and iv abx at HD center Palliative care consult requested ,to discuss advance directives and complete MOLST  (22 Apr 2022 16:22)        esrd plan for hd tues thurs sat   Excess fluids and waste products will be removed from your blood; your electrolytes will be balanced; your blood pressure will be controlled.  ceFAZolin   IVPB 1000 milliGRAM(s) IV Intermittent every 24 hours  cloNIDine 0.1 milliGRAM(s) Oral every 12 hours  clopidogrel Tablet 75 milliGRAM(s) Oral daily    ANEMIA PLAN:  Anemia of chronic disease:  We will continue epo aiming for a HCT of 32-36 %.   We will monitor Iron stores, B12 and RBC folate .    BP monitoring,continue current antihypertensive meds, low salt diet,followup
s/ p falls  esrd - on hd  missed hd- - admitted for dialysis  ashd s/p coronary stent  s/p cabg  chf  dm2  dyslipidemia  pvd- s/p  surgery   paf- not a good candidate for oac - frequent falls 4/23
s/ p falls  esrd - on hd  missed hd- - admitted for dialysis  ashd s/p coronary stent  s/p cabg  chf  dm2  dyslipidemia  pvd- s/p  surgery   paf- not a good candidate for oac - frequent falls 4/23
Patient came to ED because she  fell today on her way to dialysis, pt states that she is falling everyday and always feels weak. pt poor historian, missed dialysis today, will need admission for placement. Recent admission 04/05/22    esrd  dyspnea  poor compliance  OA  OP  PUD  OM  DM  Anemia    neuro f/u noted  remains on abx  VS noted, on ROOM AIR  labs reviewed  psych and CM notes reviewed -     retacrit on order  vs noted  labs reviewed  on ABX for OM  Renal f/u - HD as per renal team  old records reviewed  GOC discussion, documented, full code - HCP -   Dry Gangrene  
Patient came to ED because she  fell today on her way to dialysis, pt states that she is falling everyday and always feels weak. pt poor historian, missed dialysis today, will need admission for placement. Recent admission 04/05/22    esrd  dyspnea  poor compliance  OA  OP  PUD  OM  DM  Anemia    on and off o2 support  vs noted  labs reviewed  on ABX for OM  Renal f/u - HD as per renal team  old records reviewed  GOC discussion
Patient came to ED because she  fell today on her way to dialysis, pt states that she is falling everyday and always feels weak. pt poor historian, missed dialysis today, will need admission for placement. Recent admission 04/05/22 -70yo female bib ems with sob, pt states she has missed the last 2 rounds of dialysis and is due today, and has been having worsening sob, no cough, fever, chills, no chest pain no other complaints .Found to have hyperkalemia Diagnosed with foot infection MRI showed evidence of acute osteomyelitis of L medial malleolus and distal aspect of R 1st toe ,poa . -ID recommended  cefazolin 2g-2g-3g with HD x 4 weeks .Patient was discharged home with home care and iv abx at HD center Palliative care consult requested ,to discuss advance directives and complete MOLST  (22 Apr 2022 16:22)        esrd plan for hd tues thurs sat   Excess fluids and waste products will be removed from your blood; your electrolytes will be balanced; your blood pressure will be controlled.  ceFAZolin   IVPB 1000 milliGRAM(s) IV Intermittent every 24 hours  cloNIDine 0.1 milliGRAM(s) Oral every 12 hours  clopidogrel Tablet 75 milliGRAM(s) Oral daily    ANEMIA PLAN:  Anemia of chronic disease:  We will continue epo aiming for a HCT of 32-36 %.   We will monitor Iron stores, B12 and RBC folate .    BP monitoring,continue current antihypertensive meds, low salt diet,followup
Patient came to ED because she  fell today on her way to dialysis, pt states that she is falling everyday and always feels weak. pt poor historian, missed dialysis today, will need admission for placement. Recent admission 04/05/22 -72yo female bib ems with sob, pt states she has missed the last 2 rounds of dialysis and is due today, and has been having worsening sob, no cough, fever, chills, no chest pain no other complaints .Found to have hyperkalemia Diagnosed with foot infection MRI showed evidence of acute osteomyelitis of L medial malleolus and distal aspect of R 1st toe ,poa . -ID recommended  cefazolin 2g-2g-3g with HD x 4 weeks .Patient was discharged home with home care and iv abx at HD center Palliative care consult requested ,to discuss advance directives and complete MOLST  (22 Apr 2022 16:22)        esrd plan for hd tues thurs sat   Excess fluids and waste products will be removed from your blood; your electrolytes will be balanced; your blood pressure will be controlled.  ceFAZolin   IVPB 1000 milliGRAM(s) IV Intermittent every 24 hours  cloNIDine 0.1 milliGRAM(s) Oral every 12 hours  clopidogrel Tablet 75 milliGRAM(s) Oral daily    ANEMIA PLAN:  Anemia of chronic disease:  Well controlled by Aranesp  H and H subtherapeutic .  We will continue Aranesp aiming for a HCT of 32-36 %.   We will monitor Iron stores, B12 and RBC folate .    BP monitoring,continue current antihypertensive meds, low salt diet,followup
s/ p falls  esrd - on hd  missed hd- - admitted for dialysis  ashd s/p coronary stent  s/p cabg  chf  dm2  dyslipidemia  pvd- s/p  surgery   paf- not a good candidate for oac - frequent falls 4/23
Patient came to ED because she  fell today on her way to dialysis, pt states that she is falling everyday and always feels weak. pt poor historian, missed dialysis today, will need admission for placement. Recent admission 04/05/22    esrd  dyspnea  poor compliance  OA  OP  PUD  OM  DM  Anemia    neuro f/u noted  remains on abx  VS noted  labs reviewed    retacrit on order  on and off o2 support  vs noted  labs reviewed  on ABX for OM  Renal f/u - HD as per renal team  old records reviewed  GOC discussion, documented, full code - HCP -   Dry Gangrene
Patient came to ED because she  fell today on her way to dialysis, pt states that she is falling everyday and always feels weak. pt poor historian, missed dialysis today, will need admission for placement. Recent admission 04/05/22    esrd  dyspnea  poor compliance  OA  OP  PUD  OM  DM  Anemia    neuro f/u noted  remains on abx  VS noted, on ROOM AIR  labs reviewed  psych and CM notes reviewed -     retacrit on order  vs noted  labs reviewed  on ABX for OM  Renal f/u - HD as per renal team  old records reviewed  GOC discussion, documented, full code - HCP -   Dry Gangrene
Patient came to ED because she  fell today on her way to dialysis, pt states that she is falling everyday and always feels weak. pt poor historian, missed dialysis today, will need admission for placement. Recent admission 04/05/22    esrd  dyspnea  poor compliance  OA  OP  PUD  OM  DM  Anemia    neuro f/u noted  remains on abx  VS noted, on ROOM AIR  labs reviewed  psych and CM notes reviewed -     retacrit on order  vs noted  labs reviewed  on ABX for OM  Renal f/u - HD as per renal team  old records reviewed  GOC discussion, documented, full code - HCP -   Dry Gangrene  
Patient came to ED because she  fell today on her way to dialysis, pt states that she is falling everyday and always feels weak. pt poor historian, missed dialysis today, will need admission for placement. Recent admission 04/05/22    esrd  dyspnea  poor compliance  OA  OP  PUD  OM  DM  Anemia    on and off o2 support  vs noted  labs reviewed  on ABX for OM  Renal f/u - HD as per renal team  old records reviewed  GOC discussion, documented, full code - HCP -   Dry Gangrene    
Patient came to ED because she  fell today on her way to dialysis, pt states that she is falling everyday and always feels weak. pt poor historian, missed dialysis today, will need admission for placement. Recent admission 04/05/22 -70yo female bib ems with sob, pt states she has missed the last 2 rounds of dialysis and is due today, and has been having worsening sob, no cough, fever, chills, no chest pain no other complaints .Found to have hyperkalemia Diagnosed with foot infection MRI showed evidence of acute osteomyelitis of L medial malleolus and distal aspect of R 1st toe ,poa . -ID recommended  cefazolin 2g-2g-3g with HD x 4 weeks .Patient was discharged home with home care and iv abx at HD center Palliative care consult requested ,to discuss advance directives and complete MOLST  (22 Apr 2022 16:22)        esrd   Excess fluids and waste products will be removed from your blood; your electrolytes will be balanced; your blood pressure will be controlled.      ANEMIA PLAN:  Anemia of chronic disease:  Well controlled by Aranesp  H and H subtherapeutic .  We will continue Aranesp aiming for a HCT of 32-36 %.   We will monitor Iron stores, B12 and RBC folate .    BP monitoring,continue current antihypertensive meds, low salt diet,followup with PMD in 1-2 weeks  
Patient came to ED because she  fell today on her way to dialysis, pt states that she is falling everyday and always feels weak. pt poor historian, missed dialysis today, will need admission for placement. Recent admission 04/05/22 -72yo female bib ems with sob, pt states she has missed the last 2 rounds of dialysis and is due today, and has been having worsening sob, no cough, fever, chills, no chest pain no other complaints .Found to have hyperkalemia Diagnosed with foot infection MRI showed evidence of acute osteomyelitis of L medial malleolus and distal aspect of R 1st toe ,poa . -ID recommended  cefazolin 2g-2g-3g with HD x 4 weeks .Patient was discharged home with home care and iv abx at HD center Palliative care consult requested ,to discuss advance directives and complete MOLST  (22 Apr 2022 16:22)        esrd   Excess fluids and waste products will be removed from your blood; your electrolytes will be balanced; your blood pressure will be controlled.      ANEMIA PLAN:  Anemia of chronic disease:  Well controlled by Aranesp  H and H subtherapeutic .  We will continue Aranesp aiming for a HCT of 32-36 %.   We will monitor Iron stores, B12 and RBC folate .    BP monitoring,continue current antihypertensive meds, low salt diet,followup with PMD in 1-2 weeks  
Right  hallux and 2nd digit gangrenous changes  Left ankle medial malleolus stable scab 
Right  hallux and 2nd digit gangrenous changes  Left ankle medial malleolus stable scab 
s/ p falls  esrd - on hd  missed hd- - admitted for dialysis  ashd s/p coronary stent  s/p cabg  chf  dm2  dyslipidemia  pvd- s/p  surgery   paf- not a good candidate for oac - frequent falls 4/23
Patient came to ED because she  fell today on her way to dialysis, pt states that she is falling everyday and always feels weak. pt poor historian, missed dialysis today, will need admission for placement. Recent admission 04/05/22 -70yo female bib ems with sob, pt states she has missed the last 2 rounds of dialysis and is due today, and has been having worsening sob, no cough, fever, chills, no chest pain no other complaints .Found to have hyperkalemia Diagnosed with foot infection MRI showed evidence of acute osteomyelitis of L medial malleolus and distal aspect of R 1st toe ,poa . -ID recommended  cefazolin 2g-2g-3g with HD x 4 weeks .Patient was discharged home with home care and iv abx at HD center Palliative care consult requested ,to discuss advance directives and complete MOLST 
Patient came to ED because she  fell today on her way to dialysis, pt states that she is falling everyday and always feels weak. pt poor historian, missed dialysis today, will need admission for placement. Recent admission 04/05/22 -70yo female bib ems with sob, pt states she has missed the last 2 rounds of dialysis and is due today, and has been having worsening sob, no cough, fever, chills, no chest pain no other complaints .Found to have hyperkalemia Diagnosed with foot infection MRI showed evidence of acute osteomyelitis of L medial malleolus and distal aspect of R 1st toe ,poa . -ID recommended  cefazolin 2g-2g-3g with HD x 4 weeks .Patient was discharged home with home care and iv abx at HD center Palliative care consult requested ,to discuss advance directives and complete MOLST 
Patient came to ED because she  fell today on her way to dialysis, pt states that she is falling everyday and always feels weak. pt poor historian, missed dialysis today, will need admission for placement. Recent admission 04/05/22 -72yo female bib ems with sob, pt states she has missed the last 2 rounds of dialysis and is due today, and has been having worsening sob, no cough, fever, chills, no chest pain no other complaints .Found to have hyperkalemia Diagnosed with foot infection MRI showed evidence of acute osteomyelitis of L medial malleolus and distal aspect of R 1st toe ,poa . -ID recommended  cefazolin 2g-2g-3g with HD x 4 weeks .Patient was discharged home with home care and iv abx at HD center Palliative care consult requested ,to discuss advance directives and complete MOLST 
s/ p falls  esrd - on hd  missed hd- - admitted for dialysis  ashd s/p coronary stent  s/p cabg  chf  dm2  dyslipidemia  pvd- s/p  surgery   paf- not a good candidate for oac - frequent falls 4/23
Patient came to ED because she  fell today on her way to dialysis, pt states that she is falling everyday and always feels weak. pt poor historian, missed dialysis today, will need admission for placement. Recent admission 04/05/22 -72yo female bib ems with sob, pt states she has missed the last 2 rounds of dialysis and is due today, and has been having worsening sob, no cough, fever, chills, no chest pain no other complaints .Found to have hyperkalemia Diagnosed with foot infection MRI showed evidence of acute osteomyelitis of L medial malleolus and distal aspect of R 1st toe ,poa . -ID recommended  cefazolin 2g-2g-3g with HD x 4 weeks .Patient was discharged home with home care and iv abx at HD center Palliative care consult requested ,to discuss advance directives and complete MOLST 
Patient came to ED because she  fell today on her way to dialysis, pt states that she is falling everyday and always feels weak. pt poor historian, missed dialysis today, will need admission for placement. Recent admission 04/05/22 -72yo female bib ems with sob, pt states she has missed the last 2 rounds of dialysis and is due today, and has been having worsening sob, no cough, fever, chills, no chest pain no other complaints .Found to have hyperkalemia Diagnosed with foot infection MRI showed evidence of acute osteomyelitis of L medial malleolus and distal aspect of R 1st toe ,poa . -ID recommended  cefazolin 2g-2g-3g with HD x 4 weeks .Patient was discharged home with home care and iv abx at HD center Palliative care consult requested ,to discuss advance directives and complete MOLST

## 2022-05-02 NOTE — CHART NOTE - NSCHARTNOTEFT_GEN_A_CORE
Assessment/Follow up: Pt not available at time of visit; observed with physical therapist. Per records appears to be tolerating meals well with relatively good appetite/po intake; % documented past 5 days. Good intake nepro supplement. No report N/V.  4/28. Persistent hyperglycemia, -270 past 48hrs; HgbA1c 8.1%. On 12units lantus q hs- recommend further adjustments per Endo/MD. Seen by CDE today and diabetes education provided. Will remain available for further DM nutrition therapy prn.     Factors impacting intake: [x ] none [ ] nausea  [ ] vomiting [ ] diarrhea [ ] constipation  [ ]chewing problems [ ] swallowing issues  [ ] other:     Diet Presciption: Diet, Consistent Carbohydrate Renal w/Evening Snack:   Easy to Chew (EASYTOCHEW)  Abel(7 Gm Arginine/7 Gm Glut/1.2 Gm HMB     Qty per Day:  2  Supplement Feeding Modality:  Oral  Nepro Cans or Servings Per Day:  1       Frequency:  Two Times a day (04-23-22 @ 12:02)    Intake: % x 5 days; good intake nepro     Current Weight: 134.2lbs(4/30)    Pertinent Medications: MEDICATIONS  (STANDING):  aspirin enteric coated 81 milliGRAM(s) Oral daily  atorvastatin 40 milliGRAM(s) Oral at bedtime  calcium acetate 667 milliGRAM(s) Oral three times a day with meals  ceFAZolin   IVPB 1000 milliGRAM(s) IV Intermittent every 24 hours  cloNIDine 0.1 milliGRAM(s) Oral every 12 hours  clopidogrel Tablet 75 milliGRAM(s) Oral daily  dextrose 5%. 1000 milliLiter(s) (100 mL/Hr) IV Continuous <Continuous>  dextrose 5%. 1000 milliLiter(s) (50 mL/Hr) IV Continuous <Continuous>  dextrose 50% Injectable 25 Gram(s) IV Push once  dextrose 50% Injectable 12.5 Gram(s) IV Push once  dextrose 50% Injectable 25 Gram(s) IV Push once  diltiazem    Tablet 60 milliGRAM(s) Oral every 8 hours  epoetin fawad-epbx (RETACRIT) Injectable 87374 Unit(s) IV Push <User Schedule>  glucagon  Injectable 1 milliGRAM(s) IntraMuscular once  glucagon  Injectable 1 milliGRAM(s) IntraMuscular once  heparin   Injectable 5000 Unit(s) SubCutaneous every 12 hours  imipramine 50 milliGRAM(s) Oral daily  insulin glargine Injectable (LANTUS) 12 Unit(s) SubCutaneous at bedtime  insulin lispro (ADMELOG) corrective regimen sliding scale   SubCutaneous three times a day before meals  insulin lispro (ADMELOG) corrective regimen sliding scale   SubCutaneous at bedtime  lactobacillus acidophilus 1 Tablet(s) Oral two times a day  metoprolol tartrate 100 milliGRAM(s) Oral every 12 hours  multivitamin 1 Tablet(s) Oral daily  pantoprazole    Tablet 40 milliGRAM(s) Oral before breakfast  povidone iodine 10% Solution 1 Application(s) Topical daily  senna 2 Tablet(s) Oral at bedtime    MEDICATIONS  (PRN):  acetaminophen     Tablet .. 650 milliGRAM(s) Oral every 6 hours PRN Temp greater or equal to 38C (100.4F), Mild Pain (1 - 3)  aluminum hydroxide/magnesium hydroxide/simethicone Suspension 30 milliLiter(s) Oral every 4 hours PRN Dyspepsia  dextrose Oral Gel 15 Gram(s) Oral once PRN Blood Glucose LESS THAN 70 milliGRAM(s)/deciliter  melatonin 3 milliGRAM(s) Oral at bedtime PRN Insomnia  ondansetron Injectable 4 milliGRAM(s) IV Push every 8 hours PRN Nausea and/or Vomiting    Pertinent Labs: 05-02 Na133 mmol/L<L> Glu 235 mg/dL<H> K+ 4.4 mmol/L Cr  5.20 mg/dL<H> BUN 53 mg/dL<H> 04-28 Alb 2.6 g/dL<L>     CAPILLARY BLOOD GLUCOSE      POCT Blood Glucose.: 261 mg/dL (02 May 2022 07:34)  POCT Blood Glucose.: 261 mg/dL (01 May 2022 21:41)  POCT Blood Glucose.: 242 mg/dL (01 May 2022 16:42)  POCT Blood Glucose.: 245 mg/dL (01 May 2022 12:43)  POCT Blood Glucose.: 246 mg/dL (01 May 2022 11:37)    Skin: right 1st toe unstageable, right 2nd toe unstageable, left 1st, 4th, 5th toe S.DTI, left foot medical malleolus unstageable. Jaskaran 20.     Estimated Needs:   [x ] no change since previous assessment  [ ] recalculated:     Previous Nutrition Diagnosis:   [ ] Inadequate Energy Intake [ ]Inadequate Oral Intake [ ] Excessive Energy Intake   [ ] Underweight [x ] Increased Nutrient Needs [ ] Overweight/Obesity   [ ] Altered GI Function [ ] Unintended Weight Loss [ ] Food & Nutrition Related Knowledge Deficit [x ] Malnutrition     Nutrition Diagnosis is [x ] ongoing  [ ] resolved [ ] not applicable     New Nutrition Diagnosis: [x ] not applicable       Interventions:   Recommend  [ ] Change Diet To:  [ ] Nutrition Supplement  [ ] Nutrition Support  [x ] Other: Insulin adjustments per Endo/MD. Will remain available for additional DM nutrition therapy prn prior to discharge.     Monitoring and Evaluation:   [ ] PO intake [ x ] Tolerance to diet prescription [ x ] weights [ x ] labs[ x ] follow up per protocol  [ ] other: Assessment/Follow up: Pt awake/alert at time of visit. Tolerating meals well with fair appetite/po intake; % documented past 5 days in EMR. Good intake nepro supplement, fair intake abel. No report N/V/D/C. +BM 5/2.  Persistent hyperglycemia, -270 past 48hrs; HgbA1c 8.1%. On 12units lantus q hs- recommend further adjustments per Endo/MD. Seen by CDE today and diabetes education provided. Pta pt report fair dietary compliance, enjoys pastries/sweets at times. Verbal DM, Renal nutrition therapy reinforced, pt declined written instructions. Food preferences/meal alternatives obtained within diet order. Encourage consumption of HBV protein sources.     Factors impacting intake: [ ] none [ ] nausea  [ ] vomiting [ ] diarrhea [ ] constipation  [ ]chewing problems [ ] swallowing issues  [x ] other: fair appetite, weakness    Diet Presciption: Diet, Consistent Carbohydrate Renal w/Evening Snack:   Easy to Chew (EASYTOCHEW)  Abel(7 Gm Arginine/7 Gm Glut/1.2 Gm HMB     Qty per Day:  2  Supplement Feeding Modality:  Oral  Nepro Cans or Servings Per Day:  1       Frequency:  Two Times a day (04-23-22 @ 12:02)    Intake: % x 5 days; good intake nepro supplement, fair intake abel    Current Weight: 134.2lbs(4/30)    Pertinent Medications: MEDICATIONS  (STANDING):  aspirin enteric coated 81 milliGRAM(s) Oral daily  atorvastatin 40 milliGRAM(s) Oral at bedtime  calcium acetate 667 milliGRAM(s) Oral three times a day with meals  ceFAZolin   IVPB 1000 milliGRAM(s) IV Intermittent every 24 hours  cloNIDine 0.1 milliGRAM(s) Oral every 12 hours  clopidogrel Tablet 75 milliGRAM(s) Oral daily  dextrose 5%. 1000 milliLiter(s) (100 mL/Hr) IV Continuous <Continuous>  dextrose 5%. 1000 milliLiter(s) (50 mL/Hr) IV Continuous <Continuous>  dextrose 50% Injectable 25 Gram(s) IV Push once  dextrose 50% Injectable 12.5 Gram(s) IV Push once  dextrose 50% Injectable 25 Gram(s) IV Push once  diltiazem    Tablet 60 milliGRAM(s) Oral every 8 hours  epoetin fawad-epbx (RETACRIT) Injectable 96443 Unit(s) IV Push <User Schedule>  glucagon  Injectable 1 milliGRAM(s) IntraMuscular once  glucagon  Injectable 1 milliGRAM(s) IntraMuscular once  heparin   Injectable 5000 Unit(s) SubCutaneous every 12 hours  imipramine 50 milliGRAM(s) Oral daily  insulin glargine Injectable (LANTUS) 12 Unit(s) SubCutaneous at bedtime  insulin lispro (ADMELOG) corrective regimen sliding scale   SubCutaneous three times a day before meals  insulin lispro (ADMELOG) corrective regimen sliding scale   SubCutaneous at bedtime  lactobacillus acidophilus 1 Tablet(s) Oral two times a day  metoprolol tartrate 100 milliGRAM(s) Oral every 12 hours  multivitamin 1 Tablet(s) Oral daily  pantoprazole    Tablet 40 milliGRAM(s) Oral before breakfast  povidone iodine 10% Solution 1 Application(s) Topical daily  senna 2 Tablet(s) Oral at bedtime    MEDICATIONS  (PRN):  acetaminophen     Tablet .. 650 milliGRAM(s) Oral every 6 hours PRN Temp greater or equal to 38C (100.4F), Mild Pain (1 - 3)  aluminum hydroxide/magnesium hydroxide/simethicone Suspension 30 milliLiter(s) Oral every 4 hours PRN Dyspepsia  dextrose Oral Gel 15 Gram(s) Oral once PRN Blood Glucose LESS THAN 70 milliGRAM(s)/deciliter  melatonin 3 milliGRAM(s) Oral at bedtime PRN Insomnia  ondansetron Injectable 4 milliGRAM(s) IV Push every 8 hours PRN Nausea and/or Vomiting    Pertinent Labs: 05-02 Na133 mmol/L<L> Glu 235 mg/dL<H> K+ 4.4 mmol/L Cr  5.20 mg/dL<H> BUN 53 mg/dL<H> 04-28 Alb 2.6 g/dL<L>     CAPILLARY BLOOD GLUCOSE      POCT Blood Glucose.: 261 mg/dL (02 May 2022 07:34)  POCT Blood Glucose.: 261 mg/dL (01 May 2022 21:41)  POCT Blood Glucose.: 242 mg/dL (01 May 2022 16:42)  POCT Blood Glucose.: 245 mg/dL (01 May 2022 12:43)  POCT Blood Glucose.: 246 mg/dL (01 May 2022 11:37)    Skin: right 1st toe unstageable, right 2nd toe unstageable, left 1st, 4th, 5th toe S.DTI, left foot medical malleolus unstageable. Jaskaran 20.     Estimated Needs:   [x ] no change since previous assessment  [ ] recalculated:     Previous Nutrition Diagnosis:   [ ] Inadequate Energy Intake [ ]Inadequate Oral Intake [ ] Excessive Energy Intake   [ ] Underweight [x ] Increased Nutrient Needs [ ] Overweight/Obesity   [ ] Altered GI Function [ ] Unintended Weight Loss [ ] Food & Nutrition Related Knowledge Deficit [x ] Malnutrition     Nutrition Diagnosis is [x ] ongoing  [ ] resolved [ ] not applicable     New Nutrition Diagnosis: [x ] not applicable       Interventions:   Recommend  [ ] Change Diet To:  [ ] Nutrition Supplement  [ ] Nutrition Support  [x ] Other: Insulin adjustments per Endo/MD. Continued assistance/encouragement with meals/supplements.     Monitoring and Evaluation:   [x ] PO intake [ x ] Tolerance to diet prescription [ x ] weights [ x ] labs[ x ] follow up per protocol  [ ] other:

## 2022-05-02 NOTE — PROGRESS NOTE ADULT - SUBJECTIVE AND OBJECTIVE BOX
Date/Time Patient Seen:  		  Referring MD:   Data Reviewed	       Patient is a 71y old  Female who presents with a chief complaint of falls ,weakness (01 May 2022 12:56)      Subjective/HPI     PAST MEDICAL & SURGICAL HISTORY:  Diabetes mellitus II    HTN (hypertension)    h/o Anxiety attack    Depression    h/o Myocardial infarct 2007    CAD (coronary artery disease)    CAD (coronary artery disease)    h/o Hepatitis A 1969  currently resolved, no symptoms    PAD (peripheral artery disease)    Murmur, cardiac    h/o Smoking  quitted 3/2012    CRF (chronic renal failure), unspecified stage    Dialysis patient    Anemia secondary to renal failure    HTN (hypertension)    coronary stent 2007    s/p Ovarian cyst removal    s/p surgical removal of benign Skin lesion epigastric area          Medication list         MEDICATIONS  (STANDING):  aspirin enteric coated 81 milliGRAM(s) Oral daily  atorvastatin 40 milliGRAM(s) Oral at bedtime  calcium acetate 667 milliGRAM(s) Oral three times a day with meals  ceFAZolin   IVPB 1000 milliGRAM(s) IV Intermittent every 24 hours  cloNIDine 0.1 milliGRAM(s) Oral every 12 hours  clopidogrel Tablet 75 milliGRAM(s) Oral daily  dextrose 5%. 1000 milliLiter(s) (100 mL/Hr) IV Continuous <Continuous>  dextrose 5%. 1000 milliLiter(s) (50 mL/Hr) IV Continuous <Continuous>  dextrose 50% Injectable 25 Gram(s) IV Push once  dextrose 50% Injectable 12.5 Gram(s) IV Push once  dextrose 50% Injectable 25 Gram(s) IV Push once  diltiazem    Tablet 60 milliGRAM(s) Oral every 8 hours  epoetin fawad-epbx (RETACRIT) Injectable 30550 Unit(s) IV Push <User Schedule>  glucagon  Injectable 1 milliGRAM(s) IntraMuscular once  glucagon  Injectable 1 milliGRAM(s) IntraMuscular once  heparin   Injectable 5000 Unit(s) SubCutaneous every 12 hours  imipramine 50 milliGRAM(s) Oral daily  insulin glargine Injectable (LANTUS) 12 Unit(s) SubCutaneous at bedtime  insulin lispro (ADMELOG) corrective regimen sliding scale   SubCutaneous three times a day before meals  insulin lispro (ADMELOG) corrective regimen sliding scale   SubCutaneous at bedtime  lactobacillus acidophilus 1 Tablet(s) Oral two times a day  metoprolol tartrate 100 milliGRAM(s) Oral every 12 hours  multivitamin 1 Tablet(s) Oral daily  pantoprazole    Tablet 40 milliGRAM(s) Oral before breakfast  povidone iodine 10% Solution 1 Application(s) Topical daily  senna 2 Tablet(s) Oral at bedtime    MEDICATIONS  (PRN):  acetaminophen     Tablet .. 650 milliGRAM(s) Oral every 6 hours PRN Temp greater or equal to 38C (100.4F), Mild Pain (1 - 3)  aluminum hydroxide/magnesium hydroxide/simethicone Suspension 30 milliLiter(s) Oral every 4 hours PRN Dyspepsia  dextrose Oral Gel 15 Gram(s) Oral once PRN Blood Glucose LESS THAN 70 milliGRAM(s)/deciliter  melatonin 3 milliGRAM(s) Oral at bedtime PRN Insomnia  ondansetron Injectable 4 milliGRAM(s) IV Push every 8 hours PRN Nausea and/or Vomiting         Vitals log        ICU Vital Signs Last 24 Hrs  T(C): 36.8 (02 May 2022 05:08), Max: 37.2 (01 May 2022 20:07)  T(F): 98.2 (02 May 2022 05:08), Max: 98.9 (01 May 2022 20:07)  HR: 96 (02 May 2022 05:08) (72 - 98)  BP: 131/66 (02 May 2022 05:08) (115/54 - 148/82)  BP(mean): --  ABP: --  ABP(mean): --  RR: 18 (02 May 2022 05:08) (18 - 18)  SpO2: 94% (02 May 2022 05:08) (94% - 95%)           Input and Output:  I&O's Detail    30 Apr 2022 07:01  -  01 May 2022 07:00  --------------------------------------------------------  IN:  Total IN: 0 mL    OUT:    Other (mL): 1000 mL  Total OUT: 1000 mL    Total NET: -1000 mL      01 May 2022 07:01  -  02 May 2022 05:32  --------------------------------------------------------  IN:    IV PiggyBack: 100 mL  Total IN: 100 mL    OUT:  Total OUT: 0 mL    Total NET: 100 mL          Lab Data                  Review of Systems	      Objective     Physical Examination    heart s1s2  lung dec BS  abd soft      Pertinent Lab findings & Imaging      Dexter:  NO   Adequate UO     I&O's Detail    30 Apr 2022 07:01  -  01 May 2022 07:00  --------------------------------------------------------  IN:  Total IN: 0 mL    OUT:    Other (mL): 1000 mL  Total OUT: 1000 mL    Total NET: -1000 mL      01 May 2022 07:01  -  02 May 2022 05:32  --------------------------------------------------------  IN:    IV PiggyBack: 100 mL  Total IN: 100 mL    OUT:  Total OUT: 0 mL    Total NET: 100 mL               Discussed with:     Cultures:	        Radiology

## 2022-05-02 NOTE — PROGRESS NOTE ADULT - PROBLEM SELECTOR PROBLEM 10
Prophylactic measure
Need for follow-up by 
Prophylactic measure

## 2022-05-02 NOTE — PROGRESS NOTE ADULT - REASON FOR ADMISSION
falls ,weakness

## 2022-05-02 NOTE — CONSULT NOTE ADULT - PROBLEM SELECTOR RECOMMENDATION 9
Type 2 A1c 8.1% adm: ESRD  Recommend endocrine-Perlman on consult  [facility] appt: Dr Kirk- has appt info at home.  DSC recommendations: return to home regimen and glucose monitoring  diabetes education provided  Diabetes support info and cell # [phone] given   Goal 100-180 mg/dL; 140-180 mg/dL in critical care areas
Pt seen and evaluated  - Labs and films reviewed  - Pt has a history of osteomyelitis to the Right 1st and 2nd digit with gangrenous changes. Osteomyelitis to the Left ankle that was being treated with a PICC line and IV abx  - Dressed all wounds with DSD  - Recommending vascular consult for possible vascular Intervention   - Continue IV abx per ID  - Continue med management     Podiatry to follow

## 2022-05-02 NOTE — PROGRESS NOTE ADULT - SUBJECTIVE AND OBJECTIVE BOX
Patient is a 71y Female with a known history of :  Falls [W19.XXXA]    DM (diabetes mellitus) [E11.9]    Acute osteomyelitis [M86.10]    ESRD on dialysis [N18.6]    Anemia secondary to renal failure [N18.9]    Atrial fibrillation [I48.91]    PAD (peripheral artery disease) [I73.9]    Non-compliant patient [Z91.19]    Prophylactic measure [Z29.9]    Need for follow-up by  [Z78.9]    Gangrenous toe [I96]    Chronic osteomyelitis [M86.60]      HPI:  Patient came to ED because she  fell today on her way to dialysis, pt states that she is falling everyday and always feels weak. pt poor historian, missed dialysis today, will need admission for placement. Recent admission 04/05/22 -70yo female bib ems with sob, pt states she has missed the last 2 rounds of dialysis and is due today, and has been having worsening sob, no cough, fever, chills, no chest pain no other complaints .Found to have hyperkalemia Diagnosed with foot infection MRI showed evidence of acute osteomyelitis of L medial malleolus and distal aspect of R 1st toe ,poa . -ID recommended  cefazolin 2g-2g-3g with HD x 4 weeks .Patient was discharged home with home care and iv abx at HD center Palliative care consult requested ,to discuss advance directives and complete MOLST  (22 Apr 2022 16:22)      REVIEW OF SYSTEMS:    CONSTITUTIONAL: No fever, weight loss, or fatigue  EYES: No eye pain, visual disturbances, or discharge  ENMT:  No difficulty hearing, tinnitus, vertigo; No sinus or throat pain  NECK: No pain or stiffness  BREASTS: No pain, masses, or nipple discharge  RESPIRATORY: No cough, wheezing, chills or hemoptysis; No shortness of breath  CARDIOVASCULAR: No chest pain, palpitations, dizziness, or leg swelling  GASTROINTESTINAL: No abdominal or epigastric pain. No nausea, vomiting, or hematemesis; No diarrhea or constipation. No melena or hematochezia.  GENITOURINARY: No dysuria, frequency, hematuria, or incontinence  NEUROLOGICAL: No headaches, memory loss, loss of strength, numbness, or tremors  SKIN: No itching, burning, rashes, or lesions   LYMPH NODES: No enlarged glands  ENDOCRINE: No heat or cold intolerance; No hair loss  MUSCULOSKELETAL: No joint pain or swelling; No muscle, back, or extremity pain  PSYCHIATRIC: No depression, anxiety, mood swings, or difficulty sleeping  HEME/LYMPH: No easy bruising, or bleeding gums  ALLERGY AND IMMUNOLOGIC: No hives or eczema    MEDICATIONS  (STANDING):  aspirin enteric coated 81 milliGRAM(s) Oral daily  atorvastatin 40 milliGRAM(s) Oral at bedtime  calcium acetate 667 milliGRAM(s) Oral three times a day with meals  ceFAZolin   IVPB 1000 milliGRAM(s) IV Intermittent every 24 hours  cloNIDine 0.1 milliGRAM(s) Oral every 12 hours  clopidogrel Tablet 75 milliGRAM(s) Oral daily  dextrose 5%. 1000 milliLiter(s) (100 mL/Hr) IV Continuous <Continuous>  dextrose 5%. 1000 milliLiter(s) (50 mL/Hr) IV Continuous <Continuous>  dextrose 50% Injectable 25 Gram(s) IV Push once  dextrose 50% Injectable 12.5 Gram(s) IV Push once  dextrose 50% Injectable 25 Gram(s) IV Push once  diltiazem    Tablet 60 milliGRAM(s) Oral every 8 hours  epoetin fawad-epbx (RETACRIT) Injectable 38533 Unit(s) IV Push <User Schedule>  glucagon  Injectable 1 milliGRAM(s) IntraMuscular once  glucagon  Injectable 1 milliGRAM(s) IntraMuscular once  heparin   Injectable 5000 Unit(s) SubCutaneous every 12 hours  imipramine 50 milliGRAM(s) Oral daily  insulin glargine Injectable (LANTUS) 12 Unit(s) SubCutaneous at bedtime  insulin lispro (ADMELOG) corrective regimen sliding scale   SubCutaneous three times a day before meals  insulin lispro (ADMELOG) corrective regimen sliding scale   SubCutaneous at bedtime  lactobacillus acidophilus 1 Tablet(s) Oral two times a day  metoprolol tartrate 100 milliGRAM(s) Oral every 12 hours  multivitamin 1 Tablet(s) Oral daily  pantoprazole    Tablet 40 milliGRAM(s) Oral before breakfast  povidone iodine 10% Solution 1 Application(s) Topical daily  senna 2 Tablet(s) Oral at bedtime    MEDICATIONS  (PRN):  acetaminophen     Tablet .. 650 milliGRAM(s) Oral every 6 hours PRN Temp greater or equal to 38C (100.4F), Mild Pain (1 - 3)  aluminum hydroxide/magnesium hydroxide/simethicone Suspension 30 milliLiter(s) Oral every 4 hours PRN Dyspepsia  dextrose Oral Gel 15 Gram(s) Oral once PRN Blood Glucose LESS THAN 70 milliGRAM(s)/deciliter  melatonin 3 milliGRAM(s) Oral at bedtime PRN Insomnia  ondansetron Injectable 4 milliGRAM(s) IV Push every 8 hours PRN Nausea and/or Vomiting      ALLERGIES: latex (Unknown)  No Known Drug Allergies      FAMILY HISTORY:      PHYSICAL EXAMINATION:  -----------------------------  T(C): 36.8 (05-02-22 @ 05:08), Max: 37.2 (05-01-22 @ 20:07)  HR: 96 (05-02-22 @ 05:08) (72 - 98)  BP: 131/66 (05-02-22 @ 05:08) (115/54 - 148/82)  RR: 18 (05-02-22 @ 05:08) (18 - 18)  SpO2: 94% (05-02-22 @ 05:08) (94% - 95%)  Wt(kg): --    05-01 @ 07:01  -  05-02 @ 07:00  --------------------------------------------------------  IN:    IV PiggyBack: 100 mL  Total IN: 100 mL    OUT:  Total OUT: 0 mL    Total NET: 100 mL            VITALS  T(C): 36.8 (05-02-22 @ 05:08), Max: 37.2 (05-01-22 @ 20:07)  HR: 96 (05-02-22 @ 05:08) (72 - 98)  BP: 131/66 (05-02-22 @ 05:08) (115/54 - 148/82)  RR: 18 (05-02-22 @ 05:08) (18 - 18)  SpO2: 94% (05-02-22 @ 05:08) (94% - 95%)    Constitutional: well developed, normal appearance, well groomed, well nourished, no deformities and no acute distress.   Eyes: the conjunctiva exhibited no abnormalities and the eyelids demonstrated no xanthelasmas.   HEENT: normal oral mucosa, no oral pallor and no oral cyanosis.   Neck: normal jugular venous A waves present, normal jugular venous V waves present and no jugular venous wilson A waves.   Pulmonary: no respiratory distress, normal respiratory rhythm and effort, no accessory muscle use and lungs were clear to auscultation bilaterally.   Cardiovascular: heart rate and rhythm were normal, normal S1 and S2 and no murmur, gallop, rub, heave or thrill are present.   Abdomen: soft, non-tender, no hepato-splenomegaly and no abdominal mass palpated.   Musculoskeletal: the gait could not be assessed..   Extremities: no clubbing of the fingernails, no localized cyanosis, no petechial hemorrhages and no ischemic changes.   Skin: normal skin color and pigmentation, no rash, no venous stasis, no skin lesions, no skin ulcer and no xanthoma was observed.   Psychiatric: oriented to person, place, and time, the affect was normal, the mood was normal and not feeling anxious.     LABS:   --------  05-02    133<L>  |  94<L>  |  53<H>  ----------------------------<  235<H>  4.4   |  30  |  5.20<H>    Ca    10.5<H>      02 May 2022 07:43                           9.7    11.89 )-----------( 362      ( 02 May 2022 07:43 )             31.6                 RADIOLOGY:  -----------------    ECG:     ECHO:

## 2022-05-02 NOTE — PROGRESS NOTE ADULT - SUBJECTIVE AND OBJECTIVE BOX
Patient is a 71y Female whom presented to the hospital with esrd on hd    PAST MEDICAL & SURGICAL HISTORY:  Diabetes mellitus II    HTN (hypertension)    h/o Anxiety attack    Depression    h/o Myocardial infarct 2007    CAD (coronary artery disease)    h/o Hepatitis A 1969  currently resolved, no symptoms    PAD (peripheral artery disease)    Murmur, cardiac    h/o Smoking  quitted 3/2012    CRF (chronic renal failure), unspecified stage    Dialysis patient    Anemia secondary to renal failure    HTN (hypertension)    coronary stent 2007    s/p Ovarian cyst removal    s/p surgical removal of benign Skin lesion epigastric area        MEDICATIONS  (STANDING):  aspirin enteric coated 81 milliGRAM(s) Oral daily  atorvastatin 40 milliGRAM(s) Oral at bedtime  calcium acetate 667 milliGRAM(s) Oral three times a day with meals  ceFAZolin   IVPB 1000 milliGRAM(s) IV Intermittent every 24 hours  cloNIDine 0.1 milliGRAM(s) Oral every 12 hours  clopidogrel Tablet 75 milliGRAM(s) Oral daily  dextrose 5%. 1000 milliLiter(s) (100 mL/Hr) IV Continuous <Continuous>  dextrose 5%. 1000 milliLiter(s) (50 mL/Hr) IV Continuous <Continuous>  dextrose 50% Injectable 25 Gram(s) IV Push once  dextrose 50% Injectable 12.5 Gram(s) IV Push once  dextrose 50% Injectable 25 Gram(s) IV Push once  diltiazem    Tablet 60 milliGRAM(s) Oral every 8 hours  glucagon  Injectable 1 milliGRAM(s) IntraMuscular once  heparin   Injectable 5000 Unit(s) SubCutaneous every 12 hours  imipramine 50 milliGRAM(s) Oral daily  insulin lispro (ADMELOG) corrective regimen sliding scale   SubCutaneous three times a day before meals  insulin lispro (ADMELOG) corrective regimen sliding scale   SubCutaneous at bedtime  lactobacillus acidophilus 1 Tablet(s) Oral two times a day  metoprolol tartrate 100 milliGRAM(s) Oral every 12 hours  multivitamin 1 Tablet(s) Oral daily  pantoprazole    Tablet 40 milliGRAM(s) Oral before breakfast  senna 2 Tablet(s) Oral at bedtime      Allergies    latex (Unknown)  No Known Drug Allergies    Intolerances        SOCIAL HISTORY:  Denies ETOh,Smoking,     FAMILY HISTORY:      REVIEW OF SYSTEMS:    CONSTITUTIONAL: No weakness, fevers or chills  RESPIRATORY: No cough, wheezing, hemoptysis; No shortness of breath  CARDIOVASCULAR: No chest pain or palpitations  GASTROINTESTINAL: No abdominal or epigastric pain. No nausea, vomiting,                                                     9.7    11.89 )-----------( 362      ( 02 May 2022 07:43 )             31.6       CBC Full  -  ( 02 May 2022 07:43 )  WBC Count : 11.89 K/uL  RBC Count : 3.13 M/uL  Hemoglobin : 9.7 g/dL  Hematocrit : 31.6 %  Platelet Count - Automated : 362 K/uL  Mean Cell Volume : 101.0 fl  Mean Cell Hemoglobin : 31.0 pg  Mean Cell Hemoglobin Concentration : 30.7 gm/dL  Auto Neutrophil # : x  Auto Lymphocyte # : x  Auto Monocyte # : x  Auto Eosinophil # : x  Auto Basophil # : x  Auto Neutrophil % : x  Auto Lymphocyte % : x  Auto Monocyte % : x  Auto Eosinophil % : x  Auto Basophil % : x      05-02    133<L>  |  94<L>  |  53<H>  ----------------------------<  235<H>  4.4   |  30  |  5.20<H>    Ca    10.5<H>      02 May 2022 07:43        CAPILLARY BLOOD GLUCOSE      POCT Blood Glucose.: 274 mg/dL (02 May 2022 11:44)  POCT Blood Glucose.: 261 mg/dL (02 May 2022 07:34)  POCT Blood Glucose.: 261 mg/dL (01 May 2022 21:41)      Vital Signs Last 24 Hrs  T(C): 36.9 (02 May 2022 13:10), Max: 37.2 (01 May 2022 20:07)  T(F): 98.4 (02 May 2022 13:10), Max: 98.9 (01 May 2022 20:07)  HR: 83 (02 May 2022 13:10) (72 - 96)  BP: 162/75 (02 May 2022 13:10) (115/54 - 162/75)  BP(mean): --  RR: 18 (02 May 2022 13:10) (18 - 18)  SpO2: 94% (02 May 2022 13:10) (94% - 94%)                                     PHYSICAL EXAM:    Constitutional: NAD  HEENT: conjunctive   clear   Neck:  No JVD  Respiratory: CTAB  Cardiovascular: S1 and S2  Gastrointestinal: BS+, soft, NT/ND  Extremities: No peripheral edema

## 2022-05-02 NOTE — PROGRESS NOTE ADULT - PROVIDER SPECIALTY LIST ADULT
Nephrology
Neurology
Neurology
Internal Medicine
Nephrology
Neurology
Palliative Care
Cardiology
Infectious Disease
Internal Medicine
Nephrology
Neurology
Neurology
Palliative Care
Podiatry
Cardiology
Nephrology
Nephrology
Palliative Care
Cardiology
Nephrology
Cardiology
Hospitalist
Podiatry
Podiatry
Hospitalist

## 2022-05-02 NOTE — PROGRESS NOTE ADULT - SUBJECTIVE AND OBJECTIVE BOX
Patient is a 71y old  Female who presents with a chief complaint of falls ,weakness (02 May 2022 13:08)      INTERVAL OVER NIGHT EVENTS:    MEDICATIONS  (STANDING):  aspirin enteric coated 81 milliGRAM(s) Oral daily  atorvastatin 40 milliGRAM(s) Oral at bedtime  calcium acetate 667 milliGRAM(s) Oral three times a day with meals  ceFAZolin   IVPB 1000 milliGRAM(s) IV Intermittent every 24 hours  cloNIDine 0.1 milliGRAM(s) Oral every 12 hours  clopidogrel Tablet 75 milliGRAM(s) Oral daily  dextrose 5%. 1000 milliLiter(s) (100 mL/Hr) IV Continuous <Continuous>  dextrose 5%. 1000 milliLiter(s) (50 mL/Hr) IV Continuous <Continuous>  dextrose 50% Injectable 25 Gram(s) IV Push once  dextrose 50% Injectable 12.5 Gram(s) IV Push once  dextrose 50% Injectable 25 Gram(s) IV Push once  diltiazem    Tablet 60 milliGRAM(s) Oral every 8 hours  epoetin fawad-epbx (RETACRIT) Injectable 47408 Unit(s) IV Push <User Schedule>  glucagon  Injectable 1 milliGRAM(s) IntraMuscular once  glucagon  Injectable 1 milliGRAM(s) IntraMuscular once  heparin   Injectable 5000 Unit(s) SubCutaneous every 12 hours  imipramine 50 milliGRAM(s) Oral daily  insulin glargine Injectable (LANTUS) 12 Unit(s) SubCutaneous at bedtime  insulin lispro (ADMELOG) corrective regimen sliding scale   SubCutaneous three times a day before meals  insulin lispro (ADMELOG) corrective regimen sliding scale   SubCutaneous at bedtime  lactobacillus acidophilus 1 Tablet(s) Oral two times a day  metoprolol tartrate 100 milliGRAM(s) Oral every 12 hours  multivitamin 1 Tablet(s) Oral daily  pantoprazole    Tablet 40 milliGRAM(s) Oral before breakfast  povidone iodine 10% Solution 1 Application(s) Topical daily  senna 2 Tablet(s) Oral at bedtime    MEDICATIONS  (PRN):  acetaminophen     Tablet .. 650 milliGRAM(s) Oral every 6 hours PRN Temp greater or equal to 38C (100.4F), Mild Pain (1 - 3)  aluminum hydroxide/magnesium hydroxide/simethicone Suspension 30 milliLiter(s) Oral every 4 hours PRN Dyspepsia  dextrose Oral Gel 15 Gram(s) Oral once PRN Blood Glucose LESS THAN 70 milliGRAM(s)/deciliter  melatonin 3 milliGRAM(s) Oral at bedtime PRN Insomnia  ondansetron Injectable 4 milliGRAM(s) IV Push every 8 hours PRN Nausea and/or Vomiting      Allergies    latex (Unknown)  No Known Drug Allergies    Intolerances        REVIEW OF SYSTEMS:  CONSTITUTIONAL: No fever, weight loss, or fatigue  EYES: No eye pain, visual disturbances, or discharge  ENMT:  No difficulty hearing, tinnitus, vertigo; No sinus or throat pain  NECK: No pain or stiffness  BREASTS: No pain, masses, or nipple discharge  RESPIRATORY: No cough, wheezing, chills or hemoptysis; No shortness of breath  CARDIOVASCULAR: No chest pain, palpitations, dizziness, or leg swelling  GASTROINTESTINAL: No abdominal or epigastric pain. No nausea, vomiting, or hematemesis; No diarrhea or constipation. No melena or hematochezia.  GENITOURINARY: No dysuria, frequency, hematuria, or incontinence  NEUROLOGICAL: No headaches, memory loss, loss of strength, numbness, or tremors  SKIN: No itching, burning, rashes, or lesions   LYMPH NODES: No enlarged glands  ENDOCRINE: No heat or cold intolerance; No hair loss  MUSCULOSKELETAL: No joint pain or swelling; No muscle, back, or extremity pain  PSYCHIATRIC: No depression, anxiety, mood swings, or difficulty sleeping  HEME/LYMPH: No easy bruising, or bleeding gums  ALLERGY AND IMMUNOLOGIC: No hives or eczema    Vital Signs Last 24 Hrs  T(C): 36.9 (02 May 2022 13:10), Max: 37.2 (01 May 2022 20:07)  T(F): 98.4 (02 May 2022 13:10), Max: 98.9 (01 May 2022 20:07)  HR: 83 (02 May 2022 13:10) (72 - 96)  BP: 162/75 (02 May 2022 13:10) (115/54 - 162/75)  BP(mean): --  RR: 18 (02 May 2022 13:10) (18 - 18)  SpO2: 94% (02 May 2022 13:10) (94% - 94%)    PHYSICAL EXAM:  GENERAL: NAD, well-groomed, well-developed  HEAD:  Atraumatic, Normocephalic  EYES: EOMI, PERRLA, conjunctiva and sclera clear  ENMT: No tonsillar erythema, exudates, or enlargement; Moist mucous membranes, Good dentition, No lesions  NECK: Supple, No JVD, Normal thyroid  NERVOUS SYSTEM:  Alert & Oriented X3, Motor Strength 5/5 B/L upper and lower extremities; DTRs 2+ intact and symmetric  CHEST/LUNG: Clear to percussion bilaterally; No rales, rhonchi, wheezing, or rubs  HEART: Regular rate and rhythm; No murmurs, rubs, or gallops  ABDOMEN: Soft, Nontender, Nondistended; Bowel sounds present  EXTREMITIES:  2+ Peripheral Pulses, No clubbing, cyanosis, or edema  LYMPH: No lymphadenopathy noted  SKIN: No rashes or lesions    LABS:                        9.7    11.89 )-----------( 362      ( 02 May 2022 07:43 )             31.6     05-02    133<L>  |  94<L>  |  53<H>  ----------------------------<  235<H>  4.4   |  30  |  5.20<H>    Ca    10.5<H>      02 May 2022 07:43          CAPILLARY BLOOD GLUCOSE      POCT Blood Glucose.: 274 mg/dL (02 May 2022 11:44)  POCT Blood Glucose.: 261 mg/dL (02 May 2022 07:34)  POCT Blood Glucose.: 261 mg/dL (01 May 2022 21:41)      RADIOLOGY & ADDITIONAL TESTS:    Imaging Personally Reviewed:  [x] YES  [ ] NO    Consultant(s) Notes Reviewed:  [x] YES  [ ] NO    Care Discussed with Consultants/Other Providers [x] YES  [ ] NO

## 2022-05-02 NOTE — CONSULT NOTE ADULT - CONSULT REASON
RLE osteomyelitis
Wounds
falls  OP  OA  ESRD
esrd on hd
cardiac evaluation
71y A1C with Estimated Average Glucose Result: 8.1 % (04-23-22 @ 13:17)  A1C with Estimated Average Glucose Result: 8.3 % (04-06-22 @ 09:45)   diabetes mellitus uncontrolled type 2

## 2022-05-02 NOTE — DISCHARGE NOTE NURSING/CASE MANAGEMENT/SOCIAL WORK - PATIENT PORTAL LINK FT
You can access the FollowMyHealth Patient Portal offered by [facility] by registering at the following website: [email or web address]. By joining [facility]’s FollowMyHealth portal, you will also be able to view your health information using other applications (apps) compatible with our system.

## 2022-05-02 NOTE — DISCHARGE NOTE NURSING/CASE MANAGEMENT/SOCIAL WORK - NSDCVIVACCINE_GEN_ALL_CORE_FT
Tdap; 27-Nov-2015 19:03; Bob Kahn (LEV); [employer]; [id number]; IntraMuscular; Deltoid Left.; 0.5 milliLiter(s); VIS (VIS Published: 09-May-2013, VIS Presented: 27-Nov-2015);

## 2022-05-02 NOTE — PROGRESS NOTE ADULT - PROBLEM SELECTOR PROBLEM 3
DM (diabetes mellitus)
PAD (peripheral artery disease)
DM (diabetes mellitus)
DM (diabetes mellitus)
PAD (peripheral artery disease)
PAD (peripheral artery disease)
DM (diabetes mellitus)
Acute osteomyelitis
DM (diabetes mellitus)

## 2022-05-02 NOTE — CONSULT NOTE ADULT - ASSESSMENT
Physical Exam:   Vital Signs Last 24 Hrs  T(C): 36.8 (02 May 2022 05:08), Max: 37.2 (01 May 2022 20:07)  T(F): 98.2 (02 May 2022 05:08), Max: 98.9 (01 May 2022 20:07)  HR: 96 (02 May 2022 05:08) (72 - 98)  BP: 131/66 (02 May 2022 05:08) (115/54 - 148/82)  BP(mean): --  RR: 18 (02 May 2022 05:08) (18 - 18)  SpO2: 94% (02 May 2022 05:08) (94% - 95%)    General: NAD, denies Fever, chills  CVS: S1S2 no M/R/G  Resp: CTA in all fields           CAPILLARY BLOOD GLUCOSE      POCT Blood Glucose.: 261 mg/dL (02 May 2022 07:34)  POCT Blood Glucose.: 261 mg/dL (01 May 2022 21:41)  POCT Blood Glucose.: 242 mg/dL (01 May 2022 16:42)  POCT Blood Glucose.: 245 mg/dL (01 May 2022 12:43)  POCT Blood Glucose.: 246 mg/dL (01 May 2022 11:37)      Cholesterol, Serum: 113 mg/dL (05.19.21 @ 08:36)     HDL Cholesterol, Serum: 22 mg/dL (05.19.21 @ 08:36)     LDL Cholesterol Calculated: 66 mg/dL (05.19.21 @ 08:36)     DIET: CC  >50%

## 2022-05-02 NOTE — PROGRESS NOTE ADULT - PROBLEM SELECTOR PROBLEM 5
Anemia secondary to renal failure

## 2022-05-02 NOTE — PROGRESS NOTE ADULT - NUTRITIONAL ASSESSMENT
MEDICATIONS  (STANDING):  aspirin enteric coated 81 milliGRAM(s) Oral daily  atorvastatin 40 milliGRAM(s) Oral at bedtime  calcium acetate 667 milliGRAM(s) Oral three times a day with meals  ceFAZolin   IVPB 1000 milliGRAM(s) IV Intermittent every 24 hours  cloNIDine 0.1 milliGRAM(s) Oral every 12 hours  clopidogrel Tablet 75 milliGRAM(s) Oral daily  dextrose 5%. 1000 milliLiter(s) (100 mL/Hr) IV Continuous <Continuous>  dextrose 5%. 1000 milliLiter(s) (50 mL/Hr) IV Continuous <Continuous>  dextrose 50% Injectable 25 Gram(s) IV Push once  dextrose 50% Injectable 12.5 Gram(s) IV Push once  dextrose 50% Injectable 25 Gram(s) IV Push once  diltiazem    Tablet 60 milliGRAM(s) Oral every 8 hours  epoetin fawad-epbx (RETACRIT) Injectable 45060 Unit(s) IV Push <User Schedule>  glucagon  Injectable 1 milliGRAM(s) IntraMuscular once  glucagon  Injectable 1 milliGRAM(s) IntraMuscular once  heparin   Injectable 5000 Unit(s) SubCutaneous every 12 hours  imipramine 50 milliGRAM(s) Oral daily  insulin glargine Injectable (LANTUS) 10 Unit(s) SubCutaneous at bedtime  insulin lispro (ADMELOG) corrective regimen sliding scale   SubCutaneous three times a day before meals  insulin lispro (ADMELOG) corrective regimen sliding scale   SubCutaneous at bedtime  lactobacillus acidophilus 1 Tablet(s) Oral two times a day  metoprolol tartrate 100 milliGRAM(s) Oral every 12 hours  multivitamin 1 Tablet(s) Oral daily  pantoprazole    Tablet 40 milliGRAM(s) Oral before breakfast  senna 2 Tablet(s) Oral at bedtime
MEDICATIONS  (STANDING):  aspirin enteric coated 81 milliGRAM(s) Oral daily  atorvastatin 40 milliGRAM(s) Oral at bedtime  calcium acetate 667 milliGRAM(s) Oral three times a day with meals  ceFAZolin   IVPB 1000 milliGRAM(s) IV Intermittent every 24 hours  cloNIDine 0.1 milliGRAM(s) Oral every 12 hours  clopidogrel Tablet 75 milliGRAM(s) Oral daily  dextrose 5%. 1000 milliLiter(s) (100 mL/Hr) IV Continuous <Continuous>  dextrose 5%. 1000 milliLiter(s) (50 mL/Hr) IV Continuous <Continuous>  dextrose 50% Injectable 25 Gram(s) IV Push once  dextrose 50% Injectable 12.5 Gram(s) IV Push once  dextrose 50% Injectable 25 Gram(s) IV Push once  diltiazem    Tablet 60 milliGRAM(s) Oral every 8 hours  epoetin fawad-epbx (RETACRIT) Injectable 97292 Unit(s) IV Push <User Schedule>  glucagon  Injectable 1 milliGRAM(s) IntraMuscular once  glucagon  Injectable 1 milliGRAM(s) IntraMuscular once  heparin   Injectable 5000 Unit(s) SubCutaneous every 12 hours  imipramine 50 milliGRAM(s) Oral daily  insulin glargine Injectable (LANTUS) 10 Unit(s) SubCutaneous at bedtime  insulin lispro (ADMELOG) corrective regimen sliding scale   SubCutaneous three times a day before meals  insulin lispro (ADMELOG) corrective regimen sliding scale   SubCutaneous at bedtime  lactobacillus acidophilus 1 Tablet(s) Oral two times a day  metoprolol tartrate 100 milliGRAM(s) Oral every 12 hours  multivitamin 1 Tablet(s) Oral daily  pantoprazole    Tablet 40 milliGRAM(s) Oral before breakfast  senna 2 Tablet(s) Oral at bedtime
MEDICATIONS  (STANDING):  aspirin enteric coated 81 milliGRAM(s) Oral daily  atorvastatin 40 milliGRAM(s) Oral at bedtime  calcium acetate 667 milliGRAM(s) Oral three times a day with meals  ceFAZolin   IVPB 1000 milliGRAM(s) IV Intermittent every 24 hours  cloNIDine 0.1 milliGRAM(s) Oral every 12 hours  clopidogrel Tablet 75 milliGRAM(s) Oral daily  dextrose 5%. 1000 milliLiter(s) (100 mL/Hr) IV Continuous <Continuous>  dextrose 5%. 1000 milliLiter(s) (50 mL/Hr) IV Continuous <Continuous>  dextrose 50% Injectable 25 Gram(s) IV Push once  dextrose 50% Injectable 12.5 Gram(s) IV Push once  dextrose 50% Injectable 25 Gram(s) IV Push once  diltiazem    Tablet 60 milliGRAM(s) Oral every 8 hours  epoetin fawad-epbx (RETACRIT) Injectable 16026 Unit(s) IV Push <User Schedule>  glucagon  Injectable 1 milliGRAM(s) IntraMuscular once  glucagon  Injectable 1 milliGRAM(s) IntraMuscular once  heparin   Injectable 5000 Unit(s) SubCutaneous every 12 hours  imipramine 50 milliGRAM(s) Oral daily  insulin glargine Injectable (LANTUS) 10 Unit(s) SubCutaneous at bedtime  insulin lispro (ADMELOG) corrective regimen sliding scale   SubCutaneous three times a day before meals  insulin lispro (ADMELOG) corrective regimen sliding scale   SubCutaneous at bedtime  lactobacillus acidophilus 1 Tablet(s) Oral two times a day  metoprolol tartrate 100 milliGRAM(s) Oral every 12 hours  multivitamin 1 Tablet(s) Oral daily  pantoprazole    Tablet 40 milliGRAM(s) Oral before breakfast  senna 2 Tablet(s) Oral at bedtime
MEDICATIONS  (STANDING):  aspirin enteric coated 81 milliGRAM(s) Oral daily  atorvastatin 40 milliGRAM(s) Oral at bedtime  calcium acetate 667 milliGRAM(s) Oral three times a day with meals  ceFAZolin   IVPB 1000 milliGRAM(s) IV Intermittent every 24 hours  cloNIDine 0.1 milliGRAM(s) Oral every 12 hours  clopidogrel Tablet 75 milliGRAM(s) Oral daily  dextrose 5%. 1000 milliLiter(s) (100 mL/Hr) IV Continuous <Continuous>  dextrose 5%. 1000 milliLiter(s) (50 mL/Hr) IV Continuous <Continuous>  dextrose 50% Injectable 25 Gram(s) IV Push once  dextrose 50% Injectable 12.5 Gram(s) IV Push once  dextrose 50% Injectable 25 Gram(s) IV Push once  diltiazem    Tablet 60 milliGRAM(s) Oral every 8 hours  epoetin fawad-epbx (RETACRIT) Injectable 52714 Unit(s) IV Push <User Schedule>  glucagon  Injectable 1 milliGRAM(s) IntraMuscular once  glucagon  Injectable 1 milliGRAM(s) IntraMuscular once  heparin   Injectable 5000 Unit(s) SubCutaneous every 12 hours  imipramine 50 milliGRAM(s) Oral daily  insulin glargine Injectable (LANTUS) 10 Unit(s) SubCutaneous at bedtime  insulin lispro (ADMELOG) corrective regimen sliding scale   SubCutaneous three times a day before meals  insulin lispro (ADMELOG) corrective regimen sliding scale   SubCutaneous at bedtime  lactobacillus acidophilus 1 Tablet(s) Oral two times a day  metoprolol tartrate 100 milliGRAM(s) Oral every 12 hours  multivitamin 1 Tablet(s) Oral daily  pantoprazole    Tablet 40 milliGRAM(s) Oral before breakfast  senna 2 Tablet(s) Oral at bedtime
MEDICATIONS  (STANDING):  aspirin enteric coated 81 milliGRAM(s) Oral daily  atorvastatin 40 milliGRAM(s) Oral at bedtime  calcium acetate 667 milliGRAM(s) Oral three times a day with meals  ceFAZolin   IVPB 1000 milliGRAM(s) IV Intermittent every 24 hours  cloNIDine 0.1 milliGRAM(s) Oral every 12 hours  clopidogrel Tablet 75 milliGRAM(s) Oral daily  dextrose 5%. 1000 milliLiter(s) (100 mL/Hr) IV Continuous <Continuous>  dextrose 5%. 1000 milliLiter(s) (50 mL/Hr) IV Continuous <Continuous>  dextrose 50% Injectable 25 Gram(s) IV Push once  dextrose 50% Injectable 12.5 Gram(s) IV Push once  dextrose 50% Injectable 25 Gram(s) IV Push once  diltiazem    Tablet 60 milliGRAM(s) Oral every 8 hours  epoetin fawad-epbx (RETACRIT) Injectable 87624 Unit(s) IV Push <User Schedule>  glucagon  Injectable 1 milliGRAM(s) IntraMuscular once  heparin   Injectable 5000 Unit(s) SubCutaneous every 12 hours  imipramine 50 milliGRAM(s) Oral daily  insulin lispro (ADMELOG) corrective regimen sliding scale   SubCutaneous three times a day before meals  insulin lispro (ADMELOG) corrective regimen sliding scale   SubCutaneous at bedtime  lactobacillus acidophilus 1 Tablet(s) Oral two times a day  metoprolol tartrate 100 milliGRAM(s) Oral every 12 hours  multivitamin 1 Tablet(s) Oral daily  pantoprazole    Tablet 40 milliGRAM(s) Oral before breakfast  senna 2 Tablet(s) Oral at bedtime
MEDICATIONS  (STANDING):  aspirin enteric coated 81 milliGRAM(s) Oral daily  atorvastatin 40 milliGRAM(s) Oral at bedtime  calcium acetate 667 milliGRAM(s) Oral three times a day with meals  ceFAZolin   IVPB 1000 milliGRAM(s) IV Intermittent every 24 hours  cloNIDine 0.1 milliGRAM(s) Oral every 12 hours  clopidogrel Tablet 75 milliGRAM(s) Oral daily  dextrose 5%. 1000 milliLiter(s) (100 mL/Hr) IV Continuous <Continuous>  dextrose 5%. 1000 milliLiter(s) (50 mL/Hr) IV Continuous <Continuous>  dextrose 50% Injectable 25 Gram(s) IV Push once  dextrose 50% Injectable 12.5 Gram(s) IV Push once  dextrose 50% Injectable 25 Gram(s) IV Push once  diltiazem    Tablet 60 milliGRAM(s) Oral every 8 hours  epoetin fawad-epbx (RETACRIT) Injectable 54118 Unit(s) IV Push <User Schedule>  glucagon  Injectable 1 milliGRAM(s) IntraMuscular once  glucagon  Injectable 1 milliGRAM(s) IntraMuscular once  heparin   Injectable 5000 Unit(s) SubCutaneous every 12 hours  imipramine 50 milliGRAM(s) Oral daily  insulin glargine Injectable (LANTUS) 10 Unit(s) SubCutaneous at bedtime  insulin lispro (ADMELOG) corrective regimen sliding scale   SubCutaneous three times a day before meals  insulin lispro (ADMELOG) corrective regimen sliding scale   SubCutaneous at bedtime  lactobacillus acidophilus 1 Tablet(s) Oral two times a day  metoprolol tartrate 100 milliGRAM(s) Oral every 12 hours  multivitamin 1 Tablet(s) Oral daily  pantoprazole    Tablet 40 milliGRAM(s) Oral before breakfast  senna 2 Tablet(s) Oral at bedtime
This patient has been assessed with a concern for Malnutrition and has been determined to have a diagnosis/diagnoses of Severe protein-calorie malnutrition.    This patient is being managed with:   Diet Consistent Carbohydrate Renal w/Evening Snack-  Easy to Chew (EASYTOCHEW)  Abel(7 Gm Arginine/7 Gm Glut/1.2 Gm HMB     Qty per Day:  2  Supplement Feeding Modality:  Oral  Nepro Cans or Servings Per Day:  1       Frequency:  Two Times a day  Entered: Apr 23 2022 12:02PM    
This patient has been assessed with a concern for Malnutrition and has been determined to have a diagnosis/diagnoses of Severe protein-calorie malnutrition.    This patient is being managed with:   Diet Consistent Carbohydrate Renal w/Evening Snack-  Easy to Chew (EASYTOCHEW)  Abel(7 Gm Arginine/7 Gm Glut/1.2 Gm HMB     Qty per Day:  2  Supplement Feeding Modality:  Oral  Nepro Cans or Servings Per Day:  1       Frequency:  Two Times a day  Entered: Apr 23 2022 12:02PM    
MEDICATIONS  (STANDING):  aspirin enteric coated 81 milliGRAM(s) Oral daily  atorvastatin 40 milliGRAM(s) Oral at bedtime  calcium acetate 667 milliGRAM(s) Oral three times a day with meals  ceFAZolin   IVPB 1000 milliGRAM(s) IV Intermittent every 24 hours  cloNIDine 0.1 milliGRAM(s) Oral every 12 hours  clopidogrel Tablet 75 milliGRAM(s) Oral daily  dextrose 5%. 1000 milliLiter(s) (100 mL/Hr) IV Continuous <Continuous>  dextrose 5%. 1000 milliLiter(s) (50 mL/Hr) IV Continuous <Continuous>  dextrose 50% Injectable 25 Gram(s) IV Push once  dextrose 50% Injectable 12.5 Gram(s) IV Push once  dextrose 50% Injectable 25 Gram(s) IV Push once  diltiazem    Tablet 60 milliGRAM(s) Oral every 8 hours  epoetin fawad-epbx (RETACRIT) Injectable 16659 Unit(s) IV Push <User Schedule>  glucagon  Injectable 1 milliGRAM(s) IntraMuscular once  glucagon  Injectable 1 milliGRAM(s) IntraMuscular once  heparin   Injectable 5000 Unit(s) SubCutaneous every 12 hours  imipramine 50 milliGRAM(s) Oral daily  insulin glargine Injectable (LANTUS) 10 Unit(s) SubCutaneous at bedtime  insulin lispro (ADMELOG) corrective regimen sliding scale   SubCutaneous three times a day before meals  insulin lispro (ADMELOG) corrective regimen sliding scale   SubCutaneous at bedtime  lactobacillus acidophilus 1 Tablet(s) Oral two times a day  metoprolol tartrate 100 milliGRAM(s) Oral every 12 hours  multivitamin 1 Tablet(s) Oral daily  pantoprazole    Tablet 40 milliGRAM(s) Oral before breakfast  senna 2 Tablet(s) Oral at bedtime
MEDICATIONS  (STANDING):  aspirin enteric coated 81 milliGRAM(s) Oral daily  atorvastatin 40 milliGRAM(s) Oral at bedtime  calcium acetate 667 milliGRAM(s) Oral three times a day with meals  ceFAZolin   IVPB 1000 milliGRAM(s) IV Intermittent every 24 hours  cloNIDine 0.1 milliGRAM(s) Oral every 12 hours  clopidogrel Tablet 75 milliGRAM(s) Oral daily  dextrose 5%. 1000 milliLiter(s) (100 mL/Hr) IV Continuous <Continuous>  dextrose 5%. 1000 milliLiter(s) (50 mL/Hr) IV Continuous <Continuous>  dextrose 50% Injectable 25 Gram(s) IV Push once  dextrose 50% Injectable 12.5 Gram(s) IV Push once  dextrose 50% Injectable 25 Gram(s) IV Push once  diltiazem    Tablet 60 milliGRAM(s) Oral every 8 hours  epoetin fawad-epbx (RETACRIT) Injectable 07699 Unit(s) IV Push <User Schedule>  glucagon  Injectable 1 milliGRAM(s) IntraMuscular once  glucagon  Injectable 1 milliGRAM(s) IntraMuscular once  heparin   Injectable 5000 Unit(s) SubCutaneous every 12 hours  imipramine 50 milliGRAM(s) Oral daily  insulin glargine Injectable (LANTUS) 10 Unit(s) SubCutaneous at bedtime  insulin lispro (ADMELOG) corrective regimen sliding scale   SubCutaneous three times a day before meals  insulin lispro (ADMELOG) corrective regimen sliding scale   SubCutaneous at bedtime  lactobacillus acidophilus 1 Tablet(s) Oral two times a day  metoprolol tartrate 100 milliGRAM(s) Oral every 12 hours  multivitamin 1 Tablet(s) Oral daily  pantoprazole    Tablet 40 milliGRAM(s) Oral before breakfast  senna 2 Tablet(s) Oral at bedtime
MEDICATIONS  (STANDING):  aspirin enteric coated 81 milliGRAM(s) Oral daily  atorvastatin 40 milliGRAM(s) Oral at bedtime  calcium acetate 667 milliGRAM(s) Oral three times a day with meals  ceFAZolin   IVPB 1000 milliGRAM(s) IV Intermittent every 24 hours  cloNIDine 0.1 milliGRAM(s) Oral every 12 hours  clopidogrel Tablet 75 milliGRAM(s) Oral daily  dextrose 5%. 1000 milliLiter(s) (100 mL/Hr) IV Continuous <Continuous>  dextrose 5%. 1000 milliLiter(s) (50 mL/Hr) IV Continuous <Continuous>  dextrose 50% Injectable 25 Gram(s) IV Push once  dextrose 50% Injectable 12.5 Gram(s) IV Push once  dextrose 50% Injectable 25 Gram(s) IV Push once  diltiazem    Tablet 60 milliGRAM(s) Oral every 8 hours  epoetin fawad-epbx (RETACRIT) Injectable 81979 Unit(s) IV Push <User Schedule>  glucagon  Injectable 1 milliGRAM(s) IntraMuscular once  glucagon  Injectable 1 milliGRAM(s) IntraMuscular once  heparin   Injectable 5000 Unit(s) SubCutaneous every 12 hours  imipramine 50 milliGRAM(s) Oral daily  insulin glargine Injectable (LANTUS) 10 Unit(s) SubCutaneous at bedtime  insulin lispro (ADMELOG) corrective regimen sliding scale   SubCutaneous three times a day before meals  insulin lispro (ADMELOG) corrective regimen sliding scale   SubCutaneous at bedtime  lactobacillus acidophilus 1 Tablet(s) Oral two times a day  metoprolol tartrate 100 milliGRAM(s) Oral every 12 hours  multivitamin 1 Tablet(s) Oral daily  pantoprazole    Tablet 40 milliGRAM(s) Oral before breakfast  senna 2 Tablet(s) Oral at bedtime
MEDICATIONS  (STANDING):  aspirin enteric coated 81 milliGRAM(s) Oral daily  atorvastatin 40 milliGRAM(s) Oral at bedtime  calcium acetate 667 milliGRAM(s) Oral three times a day with meals  ceFAZolin   IVPB 1000 milliGRAM(s) IV Intermittent every 24 hours  cloNIDine 0.1 milliGRAM(s) Oral every 12 hours  clopidogrel Tablet 75 milliGRAM(s) Oral daily  dextrose 5%. 1000 milliLiter(s) (100 mL/Hr) IV Continuous <Continuous>  dextrose 5%. 1000 milliLiter(s) (50 mL/Hr) IV Continuous <Continuous>  dextrose 50% Injectable 25 Gram(s) IV Push once  dextrose 50% Injectable 12.5 Gram(s) IV Push once  dextrose 50% Injectable 25 Gram(s) IV Push once  diltiazem    Tablet 60 milliGRAM(s) Oral every 8 hours  epoetin fawad-epbx (RETACRIT) Injectable 59496 Unit(s) IV Push <User Schedule>  glucagon  Injectable 1 milliGRAM(s) IntraMuscular once  heparin   Injectable 5000 Unit(s) SubCutaneous every 12 hours  imipramine 50 milliGRAM(s) Oral daily  insulin lispro (ADMELOG) corrective regimen sliding scale   SubCutaneous three times a day before meals  insulin lispro (ADMELOG) corrective regimen sliding scale   SubCutaneous at bedtime  lactobacillus acidophilus 1 Tablet(s) Oral two times a day  metoprolol tartrate 100 milliGRAM(s) Oral every 12 hours  multivitamin 1 Tablet(s) Oral daily  pantoprazole    Tablet 40 milliGRAM(s) Oral before breakfast  senna 2 Tablet(s) Oral at bedtime
This patient has been assessed with a concern for Malnutrition and has been determined to have a diagnosis/diagnoses of Severe protein-calorie malnutrition.    This patient is being managed with:   Diet Consistent Carbohydrate Renal w/Evening Snack-  Easy to Chew (EASYTOCHEW)  Abel(7 Gm Arginine/7 Gm Glut/1.2 Gm HMB     Qty per Day:  2  Supplement Feeding Modality:  Oral  Nepro Cans or Servings Per Day:  1       Frequency:  Two Times a day  Entered: Apr 23 2022 12:02PM    

## 2022-05-02 NOTE — PROGRESS NOTE ADULT - TIME BILLING
as above
30
as above
CM, RN and patient  Patient stable and DC to [facility]

## 2022-05-04 LAB
A-TOCOPHEROL VIT E SERPL-MCNC: 10 MG/L — SIGNIFICANT CHANGE UP (ref 9–29)
BETA+GAMMA TOCOPHEROL SERPL-MCNC: 0.6 MG/L — SIGNIFICANT CHANGE UP (ref 0.5–4.9)

## 2022-06-13 ENCOUNTER — APPOINTMENT (OUTPATIENT)
Dept: WOUND CARE | Facility: HOSPITAL | Age: 72
End: 2022-06-13

## 2022-06-15 ENCOUNTER — INPATIENT (INPATIENT)
Facility: HOSPITAL | Age: 72
LOS: 21 days | Discharge: NURSING, REHAB OR LONG-TERM CARE | DRG: 853 | End: 2022-07-07
Attending: HOSPITALIST | Admitting: STUDENT IN AN ORGANIZED HEALTH CARE EDUCATION/TRAINING PROGRAM
Payer: MEDICARE

## 2022-06-15 VITALS
OXYGEN SATURATION: 99 % | SYSTOLIC BLOOD PRESSURE: 177 MMHG | WEIGHT: 134.04 LBS | HEIGHT: 69 IN | DIASTOLIC BLOOD PRESSURE: 89 MMHG | HEART RATE: 75 BPM | TEMPERATURE: 95 F | RESPIRATION RATE: 18 BRPM

## 2022-06-15 DIAGNOSIS — E16.2 HYPOGLYCEMIA, UNSPECIFIED: ICD-10-CM

## 2022-06-15 LAB
ALBUMIN SERPL ELPH-MCNC: 2.9 G/DL — LOW (ref 3.3–5)
ALP SERPL-CCNC: 128 U/L — HIGH (ref 40–120)
ALT FLD-CCNC: 39 U/L — SIGNIFICANT CHANGE UP (ref 12–78)
ANION GAP SERPL CALC-SCNC: 12 MMOL/L — SIGNIFICANT CHANGE UP (ref 5–17)
APPEARANCE UR: CLEAR — SIGNIFICANT CHANGE UP
APTT BLD: 27.3 SEC — LOW (ref 27.5–35.5)
AST SERPL-CCNC: 41 U/L — HIGH (ref 15–37)
BASOPHILS # BLD AUTO: 0 K/UL — SIGNIFICANT CHANGE UP (ref 0–0.2)
BASOPHILS NFR BLD AUTO: 0 % — SIGNIFICANT CHANGE UP (ref 0–2)
BILIRUB SERPL-MCNC: 0.6 MG/DL — SIGNIFICANT CHANGE UP (ref 0.2–1.2)
BILIRUB UR-MCNC: NEGATIVE — SIGNIFICANT CHANGE UP
BUN SERPL-MCNC: 48 MG/DL — HIGH (ref 7–23)
CALCIUM SERPL-MCNC: 9.1 MG/DL — SIGNIFICANT CHANGE UP (ref 8.5–10.1)
CHLORIDE SERPL-SCNC: 99 MMOL/L — SIGNIFICANT CHANGE UP (ref 96–108)
CK SERPL-CCNC: 54 U/L — SIGNIFICANT CHANGE UP (ref 26–192)
CO2 SERPL-SCNC: 25 MMOL/L — SIGNIFICANT CHANGE UP (ref 22–31)
COLOR SPEC: YELLOW — SIGNIFICANT CHANGE UP
CREAT SERPL-MCNC: 4.8 MG/DL — HIGH (ref 0.5–1.3)
DIFF PNL FLD: NEGATIVE — SIGNIFICANT CHANGE UP
EGFR: 9 ML/MIN/1.73M2 — LOW
EOSINOPHIL # BLD AUTO: 0 K/UL — SIGNIFICANT CHANGE UP (ref 0–0.5)
EOSINOPHIL NFR BLD AUTO: 0 % — SIGNIFICANT CHANGE UP (ref 0–6)
GLUCOSE SERPL-MCNC: 134 MG/DL — HIGH (ref 70–99)
GLUCOSE UR QL: NEGATIVE — SIGNIFICANT CHANGE UP
HCT VFR BLD CALC: 43.2 % — SIGNIFICANT CHANGE UP (ref 34.5–45)
HGB BLD-MCNC: 13.2 G/DL — SIGNIFICANT CHANGE UP (ref 11.5–15.5)
INR BLD: 1.14 RATIO — SIGNIFICANT CHANGE UP (ref 0.88–1.16)
KETONES UR-MCNC: ABNORMAL
LACTATE SERPL-SCNC: 2 MMOL/L — SIGNIFICANT CHANGE UP (ref 0.7–2)
LEUKOCYTE ESTERASE UR-ACNC: NEGATIVE — SIGNIFICANT CHANGE UP
LYMPHOCYTES # BLD AUTO: 0.5 K/UL — LOW (ref 1–3.3)
LYMPHOCYTES # BLD AUTO: 3 % — LOW (ref 13–44)
MCHC RBC-ENTMCNC: 29 PG — SIGNIFICANT CHANGE UP (ref 27–34)
MCHC RBC-ENTMCNC: 30.6 GM/DL — LOW (ref 32–36)
MCV RBC AUTO: 94.9 FL — SIGNIFICANT CHANGE UP (ref 80–100)
MONOCYTES # BLD AUTO: 1.01 K/UL — HIGH (ref 0–0.9)
MONOCYTES NFR BLD AUTO: 6 % — SIGNIFICANT CHANGE UP (ref 2–14)
NEUTROPHILS # BLD AUTO: 15.14 K/UL — HIGH (ref 1.8–7.4)
NEUTROPHILS NFR BLD AUTO: 89 % — HIGH (ref 43–77)
NITRITE UR-MCNC: NEGATIVE — SIGNIFICANT CHANGE UP
NT-PROBNP SERPL-SCNC: HIGH PG/ML (ref 0–125)
PH UR: 8 — SIGNIFICANT CHANGE UP (ref 5–8)
PLATELET # BLD AUTO: 260 K/UL — SIGNIFICANT CHANGE UP (ref 150–400)
POTASSIUM SERPL-MCNC: 3.9 MMOL/L — SIGNIFICANT CHANGE UP (ref 3.5–5.3)
POTASSIUM SERPL-SCNC: 3.9 MMOL/L — SIGNIFICANT CHANGE UP (ref 3.5–5.3)
PROT SERPL-MCNC: 7 G/DL — SIGNIFICANT CHANGE UP (ref 6–8.3)
PROT UR-MCNC: NEGATIVE — SIGNIFICANT CHANGE UP
PROTHROM AB SERPL-ACNC: 13.4 SEC — SIGNIFICANT CHANGE UP (ref 10.5–13.4)
RBC # BLD: 4.55 M/UL — SIGNIFICANT CHANGE UP (ref 3.8–5.2)
RBC # FLD: 18.1 % — HIGH (ref 10.3–14.5)
SODIUM SERPL-SCNC: 136 MMOL/L — SIGNIFICANT CHANGE UP (ref 135–145)
SP GR SPEC: 1.01 — SIGNIFICANT CHANGE UP (ref 1.01–1.02)
TROPONIN I, HIGH SENSITIVITY RESULT: 275.9 NG/L — HIGH
UROBILINOGEN FLD QL: 1
WBC # BLD: 16.82 K/UL — HIGH (ref 3.8–10.5)
WBC # FLD AUTO: 16.82 K/UL — HIGH (ref 3.8–10.5)

## 2022-06-15 PROCEDURE — G1004: CPT

## 2022-06-15 PROCEDURE — 72125 CT NECK SPINE W/O DYE: CPT | Mod: 26,MG

## 2022-06-15 PROCEDURE — 71045 X-RAY EXAM CHEST 1 VIEW: CPT | Mod: 26

## 2022-06-15 PROCEDURE — 99285 EMERGENCY DEPT VISIT HI MDM: CPT

## 2022-06-15 PROCEDURE — 93010 ELECTROCARDIOGRAM REPORT: CPT

## 2022-06-15 PROCEDURE — 70450 CT HEAD/BRAIN W/O DYE: CPT | Mod: 26,MG

## 2022-06-15 RX ORDER — DEXTROSE 50 % IN WATER 50 %
50 SYRINGE (ML) INTRAVENOUS ONCE
Refills: 0 | Status: COMPLETED | OUTPATIENT
Start: 2022-06-15 | End: 2022-06-15

## 2022-06-15 RX ORDER — SODIUM CHLORIDE 9 MG/ML
1000 INJECTION, SOLUTION INTRAVENOUS
Refills: 0 | Status: DISCONTINUED | OUTPATIENT
Start: 2022-06-15 | End: 2022-06-16

## 2022-06-15 RX ORDER — CEFTRIAXONE 500 MG/1
1000 INJECTION, POWDER, FOR SOLUTION INTRAMUSCULAR; INTRAVENOUS ONCE
Refills: 0 | Status: COMPLETED | OUTPATIENT
Start: 2022-06-15 | End: 2022-06-15

## 2022-06-15 RX ADMIN — SODIUM CHLORIDE 200 MILLILITER(S): 9 INJECTION, SOLUTION INTRAVENOUS at 21:34

## 2022-06-15 RX ADMIN — CEFTRIAXONE 100 MILLIGRAM(S): 500 INJECTION, POWDER, FOR SOLUTION INTRAMUSCULAR; INTRAVENOUS at 22:34

## 2022-06-15 RX ADMIN — Medication 50 MILLILITER(S): at 20:50

## 2022-06-15 NOTE — ED ADULT NURSE NOTE - NS ED NURSE PATIENT LEFT UNIT TIME
Alert-The patient is alert, awake and responds to voice. The patient is oriented to time, place, and person. The triage nurse is able to obtain subjective information.
05:55

## 2022-06-15 NOTE — ED PROVIDER NOTE - CLINICAL SUMMARY MEDICAL DECISION MAKING FREE TEXT BOX
Persistent acute hypoglycemia - pt states nl po, nl insulin use today . check labs, xr, UA, IV glucose, TBA

## 2022-06-15 NOTE — ED ADULT NURSE NOTE - OBJECTIVE STATEMENT
Patient is 72yo F presents via EMS after having a fall at home, BGL found to be 51 in field, patient given 15gm oral glucose by EMS. Patient BGL 32 upon arrival to ED. MD Mahmood came to bedside to evaluate patient. Patient reports weakness, denies any other complaints. Patient's son Fernie Infante ([phone]) presented to bedside, reports three weeks ago patient was admitted after a fall, BGL found to be 500, patient was started on higher dose Lantus (40 units nightly), son reports patient has been taking her insulin regularly but did not eat today.

## 2022-06-15 NOTE — ED PROVIDER NOTE - OBJECTIVE STATEMENT
72 yo F p/w BIB EMS for gen weakness at home, found glucose ~ 50, given oral glucose. Now glu 30s. NO fever/chills. Pt co feeling weak. Pt reportedly fell today at home, was feeling weak. Pt denies LOC. NO cp/sob/palp. no cough/ uri. No dysuria/  hematuria. no abd pain. no neck / back pain. no agg/allev factors. no other acute co or changes.

## 2022-06-15 NOTE — ED PROVIDER NOTE - PROGRESS NOTE DETAILS
Micheal Perez, will see pt to arrange dialysis. [facility] Hospitalist (covering [facility]), Dr Finney, accepts admit.

## 2022-06-15 NOTE — ED PROVIDER NOTE - CHPI ED SYMPTOMS NEG
no back pain/no dizziness/no headache/no loss of consciousness/no vomiting/no decreased eating/drinking

## 2022-06-15 NOTE — ED ADULT NURSE NOTE - ED CARDIAC HEART SOUNDS
[FreeTextEntry1] :  pt ois feeling well\par  no chest pains\par   meds reviewed\par  blood work not done
normal S1, S2 heard

## 2022-06-15 NOTE — CONSULT NOTE ADULT - ASSESSMENT
HPI:  Patient is a 71 year old female with PMH of A.fib rate controlled not on AC for hx of multiple falls, T2DM, HTN, HLD, ESRD on HD, CHF, s/p CABG brought in by EMS for generalized weakness at home. Patient states that she was doing well when in the afternoon she tried to get up from her couch and felt weak in the LE and fell back in her couch. Denies any hx of head trauma or loss of consciousness. Denies any weakness or numbness. Denies any chest pain, SOB or palpitations. No fever, cough or chills. No hx of recent travel or sick contacts. At the time of EMS arrival patient was found to have POCBS of 50. EMS gave dextrose and on arrival to the ED patient blood sugar was found to be in 30's with hypothermia of 94.9.    ED course:   Vitals:   BP: 176/85  HR:76  Temp:94.2  RR:18, O2:96% on RA   Significant Labs:   CBC: WBC:16.82 Hb:13.2 , Platlets: 260, Neutro:89   CMP: Lytes: WNL, BUN/Creatinine:48/4.8, Glucose:134 , Alkaline Phos:128, AST, 41, eGFR: 9, HsTrop: 275.9, ProBNP: 366718, Lactate: 2  UA; negative  CXR: Heart and lung appear normal (self read)  CT BRAIN: No acute intracranial bleeding. Central volume loss, chronic microvascular ischemic changes, and chronic bilateral lacunar infarctions.  CT CERVICAL SPINE: No fracture. Grade 1 C4-C5 anterolisthesis. C6-C7 disc degeneration. Bilateral pleural effusions and interlobular septal thickening due to   interstitial edema.  EKG: NSR, left axis deviation, HR 71,   QT/QTc: 426/462  Patient received: Ceftriaxone 1 g x1, Dextrose 5% + NS 1L x1, Dextrose 50% IV X1, heating blanket, 2L NC   (16 Jun 2022 01:19)      esrd on hd tues thurs sat     ANEMIA PLAN:  Anemia of chronic disease:  We will continue epo  aiming for a HCT of 32-36 %.   We will monitor Iron stores, B12 and RBC folate .    BP monitoring,continue current antihypertensive meds, low salt diet    f/u  blood and urine cx,serial lactate levels,monitor vitals closley,ivfs hydration,monitor urine output and renal profile,iv abx

## 2022-06-15 NOTE — CONSULT NOTE ADULT - SUBJECTIVE AND OBJECTIVE BOX
Patient is a 71y Female whom presented to the hospital with esrd on hd   PAST MEDICAL & SURGICAL HISTORY: Diabetes mellitus II   HTN (hypertension)   h/o Anxiety attack   Depression   h/o Myocardial infarct 2007   CAD (coronary artery disease)   h/o Hepatitis A 1969 currently resolved, no symptoms   PAD (peripheral artery disease)   Murmur, cardiac   h/o Smoking quitted 3/2012   CRF (chronic renal failure), unspecified stage   Dialysis patient   Anemia secondary to renal failure   HTN (hypertension)   coronary stent 2007   s/p Ovarian cyst removal   s/p surgical removal of benign Skin lesion epigastric area     MEDICATIONS  (STANDING): amLODIPine   Tablet 5 milliGRAM(s) Oral daily aspirin enteric coated 81 milliGRAM(s) Oral daily atorvastatin 40 milliGRAM(s) Oral at bedtime calcium acetate 667 milliGRAM(s) Oral three times a day with meals cefTRIAXone   IVPB 1000 milliGRAM(s) IV Intermittent every 24 hours cloNIDine 0.1 milliGRAM(s) Oral two times a day clopidogrel Tablet 75 milliGRAM(s) Oral daily dextrose 5%. 1000 milliLiter(s) (100 mL/Hr) IV Continuous <Continuous> dextrose 5%. 1000 milliLiter(s) (50 mL/Hr) IV Continuous <Continuous> dextrose 50% Injectable 25 Gram(s) IV Push once dextrose 50% Injectable 12.5 Gram(s) IV Push once dextrose 50% Injectable 25 Gram(s) IV Push once diltiazem    Tablet 60 milliGRAM(s) Oral every 8 hours glucagon  Injectable 1 milliGRAM(s) IntraMuscular once heparin   Injectable 5000 Unit(s) SubCutaneous every 12 hours imipramine 50 milliGRAM(s) Oral daily insulin glargine Injectable (LANTUS) 12 Unit(s) SubCutaneous at bedtime insulin lispro (ADMELOG) corrective regimen sliding scale   SubCutaneous three times a day before meals insulin lispro (ADMELOG) corrective regimen sliding scale   SubCutaneous at bedtime metoprolol tartrate 100 milliGRAM(s) Oral every 12 hours pantoprazole    Tablet 40 milliGRAM(s) Oral before breakfast povidone iodine 10% Solution 1 Application(s) Topical daily   Allergies  latex (Unknown) No Known Drug Allergies  Intolerances    SOCIAL HISTORY: Denies ETOh,Smoking,   FAMILY HISTORY: No pertinent family history in first degree relatives    REVIEW OF SYSTEMS:  CONSTITUTIONAL: No weakness, fevers or chills RESPIRATORY: No cough, wheezing, hemoptysis; No shortness of breath CARDIOVASCULAR: No chest pain or palpitations GASTROINTESTINAL: No abdominal or epigastric pain. No nausea, vomiting,    No diarrhea or constipation. No melena  SKIN: dry  Physical Exam: Vital Signs Last 24 Hrs T(C): 34.6 (15 Justin 2022 22:03), Max: 35 (15 Justin 2022 20:30) T(F): 94.2 (15 Justin 2022 22:03), Max: 95 (15 Justin 2022 20:30) HR: 76 (15 Justin 2022 22:03) (75 - 76) BP: 176/85 (15 Justin 2022 22:03) (176/85 - 177/89) RR: 18 (15 Justin 2022 22:03) (18 - 18) SpO2: 96% (15 Justin 2022 22:03) (96% - 99%)    15-Justin-2022 22:44, Respiratory Viral Panel with COVID-19 by TINA Rapid RVP Result: NotDetec, [NotDetec] General Chemistry:   15-Justin-2022 21:13, Comprehensive Metabolic Panel Sodium, Serum: 136, [135 - 145 mmol/L] Potassium, Serum: 3.9, [3.5 - 5.3 mmol/L] Chloride, Serum: 99, [96 - 108 mmol/L] Carbon Dioxide, Serum: 25, [22 - 31 mmol/L] Anion Gap, Serum: 12, [5 - 17 mmol/L] Blood Urea Nitrogen, Serum:    48, [7 - 23 mg/dL] Creatinine, Serum:    4.80, [0.50 - 1.30 mg/dL] Glucose, Serum:    134, [70 - 99 mg/dL] Calcium, Total Serum: 9.1, [8.5 - 10.1 mg/dL] Protein Total, Serum: 7.0, [6.0 - 8.3 g/dL] Albumin, Serum:    2.9, [3.3 - 5.0 g/dL] Bilirubin Total, Serum: 0.6, [0.2 - 1.2 mg/dL] Alkaline Phosphatase, Serum:    128, [40 - 120 U/L]  Aspartate Aminotransferase (AST/SGOT):    41, [15 - 37 U/L] Alanine Aminotransferase (ALT/SGPT): 39, [12 - 78 U/L] eGFR:    9, [>=60 mL/min/1.73m2], The estimated glomerular filtration rate (eGFR) is calculated using the 	2021 CKD-EPI creatinine equation, which does not have a coefficient for 	race and is validated in individuals 18 years of age and older (N Engl J 	Med 2021; 385:3668-1774). Creatinine-based eGFR may be inaccurate in 	various situations including but not limited to extremes of muscle mass, 	altered dietary protein intake, or medications that affect renal tubular 	creatinine secretion.   15-Justin-2022 21:13, Creatine Kinase, Serum Creatine Kinase, Serum: 54, [26 - 192 U/L]   15-Justin-2022 21:13, Serum Pro-Brain Natriuretic Peptide Serum Pro-Brain Natriuretic Peptide:    228621, [0 - 125 pg/mL]   15-Justin-2022 22:45, Lactate, Blood Lactate, Blood: 2.0, [0.7 - 2.0 mmol/L] Coagulation:   15-Justin-2022 22:45, Activated Partial Thromboplastin Time Activated Partial Thromboplastin Time:    27.3, [27.5 - 35.5 sec], The recommended therapeutic heparin range (full dose) is 58-99 seconds. 	Argatroban range is 1.5 to 3.0 times of the baseline APTT value, not to 	exceed 100 seconds. 	Routine coagulation results should be interpreted with caution when 	taking Factor Xa inhibitors or direct thrombin inhibitors; blood sampling 	prior to drug intake is recommended.   15-Justin-2022 22:45, Prothrombin Time and INR, Plasma Prothrombin Time, Plasma: 13.4, [10.5 - 13.4 sec], Effective February 23,2022, the reference range has changed. INR: 1.14, [0.88 - 1.16 ratio], Recommended targets/ranges for therapeutic INR: 	2.0-3.0 Deep vein thrombosis, pulmonary embolism, atrial fibrillation 	2.0-3.0 Mechanical aortic valve, antiphospholipid syndrome with previous 	arterial or venous thromboembolism 	2.5-3.5 Mechanical mitral valve, double mechanical valve (aortic and 	mitral positions, high risk valves) 	Note: Chest 2012 Feb;141(2 Suppl):7S-47S 	Routine coagulation results should be interpreted with caution when 	taking Factor Xa inhibitors or direct thrombin inhibitors; blood sampling 	prior to drug intake is recommended. PHYSICAL EXAM:  Constitutional: NAD HEENT: conjunctive   clear  Neck:  No JVD Respiratory: CTAB Cardiovascular: S1 and S2 Gastrointestinal: BS+, soft, NT/ND Extremities: No peripheral edema  , pos toe bandage  Neurological:  no focal deficits Psychiatric: Normal mood, normal affect : No Renteria Skin: No rashes Access: pos fistula

## 2022-06-16 DIAGNOSIS — Z29.9 ENCOUNTER FOR PROPHYLACTIC MEASURES, UNSPECIFIED: ICD-10-CM

## 2022-06-16 DIAGNOSIS — E16.2 HYPOGLYCEMIA, UNSPECIFIED: ICD-10-CM

## 2022-06-16 DIAGNOSIS — F32.9 MAJOR DEPRESSIVE DISORDER, SINGLE EPISODE, UNSPECIFIED: ICD-10-CM

## 2022-06-16 DIAGNOSIS — I50.20 UNSPECIFIED SYSTOLIC (CONGESTIVE) HEART FAILURE: ICD-10-CM

## 2022-06-16 DIAGNOSIS — R65.10 SYSTEMIC INFLAMMATORY RESPONSE SYNDROME (SIRS) OF NON-INFECTIOUS ORIGIN WITHOUT ACUTE ORGAN DYSFUNCTION: ICD-10-CM

## 2022-06-16 DIAGNOSIS — R77.8 OTHER SPECIFIED ABNORMALITIES OF PLASMA PROTEINS: ICD-10-CM

## 2022-06-16 DIAGNOSIS — E11.9 TYPE 2 DIABETES MELLITUS WITHOUT COMPLICATIONS: ICD-10-CM

## 2022-06-16 DIAGNOSIS — E78.5 HYPERLIPIDEMIA, UNSPECIFIED: ICD-10-CM

## 2022-06-16 DIAGNOSIS — I10 ESSENTIAL (PRIMARY) HYPERTENSION: ICD-10-CM

## 2022-06-16 DIAGNOSIS — I50.9 HEART FAILURE, UNSPECIFIED: ICD-10-CM

## 2022-06-16 DIAGNOSIS — I48.91 UNSPECIFIED ATRIAL FIBRILLATION: ICD-10-CM

## 2022-06-16 DIAGNOSIS — J90 PLEURAL EFFUSION, NOT ELSEWHERE CLASSIFIED: ICD-10-CM

## 2022-06-16 DIAGNOSIS — N18.6 END STAGE RENAL DISEASE: ICD-10-CM

## 2022-06-16 LAB
ALBUMIN SERPL ELPH-MCNC: 2.7 G/DL — LOW (ref 3.3–5)
ALP SERPL-CCNC: 116 U/L — SIGNIFICANT CHANGE UP (ref 40–120)
ALT FLD-CCNC: 27 U/L — SIGNIFICANT CHANGE UP (ref 12–78)
ANION GAP SERPL CALC-SCNC: 10 MMOL/L — SIGNIFICANT CHANGE UP (ref 5–17)
AST SERPL-CCNC: 26 U/L — SIGNIFICANT CHANGE UP (ref 15–37)
BASOPHILS # BLD AUTO: 0.03 K/UL — SIGNIFICANT CHANGE UP (ref 0–0.2)
BASOPHILS NFR BLD AUTO: 0.2 % — SIGNIFICANT CHANGE UP (ref 0–2)
BILIRUB SERPL-MCNC: 0.5 MG/DL — SIGNIFICANT CHANGE UP (ref 0.2–1.2)
BUN SERPL-MCNC: 56 MG/DL — HIGH (ref 7–23)
CALCIUM SERPL-MCNC: 8.9 MG/DL — SIGNIFICANT CHANGE UP (ref 8.5–10.1)
CHLORIDE SERPL-SCNC: 97 MMOL/L — SIGNIFICANT CHANGE UP (ref 96–108)
CO2 SERPL-SCNC: 29 MMOL/L — SIGNIFICANT CHANGE UP (ref 22–31)
CREAT SERPL-MCNC: 5.4 MG/DL — HIGH (ref 0.5–1.3)
EGFR: 8 ML/MIN/1.73M2 — LOW
EOSINOPHIL # BLD AUTO: 0.02 K/UL — SIGNIFICANT CHANGE UP (ref 0–0.5)
EOSINOPHIL NFR BLD AUTO: 0.1 % — SIGNIFICANT CHANGE UP (ref 0–6)
GLUCOSE SERPL-MCNC: 244 MG/DL — HIGH (ref 70–99)
HCT VFR BLD CALC: 35.1 % — SIGNIFICANT CHANGE UP (ref 34.5–45)
HGB BLD-MCNC: 11.1 G/DL — LOW (ref 11.5–15.5)
IMM GRANULOCYTES NFR BLD AUTO: 1.1 % — SIGNIFICANT CHANGE UP (ref 0–1.5)
LACTATE SERPL-SCNC: 1.3 MMOL/L — SIGNIFICANT CHANGE UP (ref 0.7–2)
LYMPHOCYTES # BLD AUTO: 0.8 K/UL — LOW (ref 1–3.3)
LYMPHOCYTES # BLD AUTO: 5 % — LOW (ref 13–44)
MCHC RBC-ENTMCNC: 28.9 PG — SIGNIFICANT CHANGE UP (ref 27–34)
MCHC RBC-ENTMCNC: 31.6 GM/DL — LOW (ref 32–36)
MCV RBC AUTO: 91.4 FL — SIGNIFICANT CHANGE UP (ref 80–100)
MONOCYTES # BLD AUTO: 1.52 K/UL — HIGH (ref 0–0.9)
MONOCYTES NFR BLD AUTO: 9.4 % — SIGNIFICANT CHANGE UP (ref 2–14)
NEUTROPHILS # BLD AUTO: 13.54 K/UL — HIGH (ref 1.8–7.4)
NEUTROPHILS NFR BLD AUTO: 84.2 % — HIGH (ref 43–77)
NRBC # BLD: 0 /100 WBCS — SIGNIFICANT CHANGE UP (ref 0–0)
PLATELET # BLD AUTO: 284 K/UL — SIGNIFICANT CHANGE UP (ref 150–400)
POTASSIUM SERPL-MCNC: 4.5 MMOL/L — SIGNIFICANT CHANGE UP (ref 3.5–5.3)
POTASSIUM SERPL-SCNC: 4.5 MMOL/L — SIGNIFICANT CHANGE UP (ref 3.5–5.3)
PROT SERPL-MCNC: 6.3 G/DL — SIGNIFICANT CHANGE UP (ref 6–8.3)
RAPID RVP RESULT: SIGNIFICANT CHANGE UP
RBC # BLD: 3.84 M/UL — SIGNIFICANT CHANGE UP (ref 3.8–5.2)
RBC # FLD: 17.9 % — HIGH (ref 10.3–14.5)
SARS-COV-2 RNA SPEC QL NAA+PROBE: SIGNIFICANT CHANGE UP
SODIUM SERPL-SCNC: 136 MMOL/L — SIGNIFICANT CHANGE UP (ref 135–145)
TROPONIN I, HIGH SENSITIVITY RESULT: 242.3 NG/L — HIGH
WBC # BLD: 16.09 K/UL — HIGH (ref 3.8–10.5)
WBC # FLD AUTO: 16.09 K/UL — HIGH (ref 3.8–10.5)

## 2022-06-16 PROCEDURE — 99233 SBSQ HOSP IP/OBS HIGH 50: CPT

## 2022-06-16 PROCEDURE — 99222 1ST HOSP IP/OBS MODERATE 55: CPT

## 2022-06-16 PROCEDURE — 99223 1ST HOSP IP/OBS HIGH 75: CPT | Mod: GC

## 2022-06-16 PROCEDURE — 71250 CT THORAX DX C-: CPT | Mod: 26

## 2022-06-16 RX ORDER — DEXTROSE 50 % IN WATER 50 %
15 SYRINGE (ML) INTRAVENOUS ONCE
Refills: 0 | Status: DISCONTINUED | OUTPATIENT
Start: 2022-06-16 | End: 2022-06-21

## 2022-06-16 RX ORDER — VANCOMYCIN HCL 1 G
1000 VIAL (EA) INTRAVENOUS ONCE
Refills: 0 | Status: COMPLETED | OUTPATIENT
Start: 2022-06-16 | End: 2022-06-16

## 2022-06-16 RX ORDER — METOPROLOL TARTRATE 50 MG
100 TABLET ORAL EVERY 12 HOURS
Refills: 0 | Status: DISCONTINUED | OUTPATIENT
Start: 2022-06-16 | End: 2022-06-21

## 2022-06-16 RX ORDER — DEXTROSE 50 % IN WATER 50 %
12.5 SYRINGE (ML) INTRAVENOUS ONCE
Refills: 0 | Status: DISCONTINUED | OUTPATIENT
Start: 2022-06-16 | End: 2022-06-21

## 2022-06-16 RX ORDER — ACETAMINOPHEN 500 MG
650 TABLET ORAL EVERY 6 HOURS
Refills: 0 | Status: DISCONTINUED | OUTPATIENT
Start: 2022-06-16 | End: 2022-06-21

## 2022-06-16 RX ORDER — AMLODIPINE BESYLATE 2.5 MG/1
5 TABLET ORAL DAILY
Refills: 0 | Status: DISCONTINUED | OUTPATIENT
Start: 2022-06-16 | End: 2022-06-21

## 2022-06-16 RX ORDER — INSULIN GLARGINE 100 [IU]/ML
20 INJECTION, SOLUTION SUBCUTANEOUS AT BEDTIME
Refills: 0 | Status: DISCONTINUED | OUTPATIENT
Start: 2022-06-16 | End: 2022-06-16

## 2022-06-16 RX ORDER — LANOLIN ALCOHOL/MO/W.PET/CERES
3 CREAM (GRAM) TOPICAL AT BEDTIME
Refills: 0 | Status: DISCONTINUED | OUTPATIENT
Start: 2022-06-16 | End: 2022-06-21

## 2022-06-16 RX ORDER — DEXTROSE 50 % IN WATER 50 %
25 SYRINGE (ML) INTRAVENOUS ONCE
Refills: 0 | Status: DISCONTINUED | OUTPATIENT
Start: 2022-06-16 | End: 2022-06-21

## 2022-06-16 RX ORDER — INSULIN GLARGINE 100 [IU]/ML
12 INJECTION, SOLUTION SUBCUTANEOUS AT BEDTIME
Refills: 0 | Status: DISCONTINUED | OUTPATIENT
Start: 2022-06-16 | End: 2022-06-18

## 2022-06-16 RX ORDER — SODIUM CHLORIDE 9 MG/ML
1000 INJECTION, SOLUTION INTRAVENOUS
Refills: 0 | Status: DISCONTINUED | OUTPATIENT
Start: 2022-06-16 | End: 2022-06-21

## 2022-06-16 RX ORDER — ATORVASTATIN CALCIUM 80 MG/1
40 TABLET, FILM COATED ORAL AT BEDTIME
Refills: 0 | Status: DISCONTINUED | OUTPATIENT
Start: 2022-06-16 | End: 2022-06-21

## 2022-06-16 RX ORDER — CALCIUM ACETATE 667 MG
667 TABLET ORAL
Refills: 0 | Status: DISCONTINUED | OUTPATIENT
Start: 2022-06-16 | End: 2022-06-16

## 2022-06-16 RX ORDER — POVIDONE-IODINE 5 %
1 AEROSOL (ML) TOPICAL DAILY
Refills: 0 | Status: DISCONTINUED | OUTPATIENT
Start: 2022-06-16 | End: 2022-06-21

## 2022-06-16 RX ORDER — ASPIRIN/CALCIUM CARB/MAGNESIUM 324 MG
81 TABLET ORAL DAILY
Refills: 0 | Status: DISCONTINUED | OUTPATIENT
Start: 2022-06-16 | End: 2022-06-21

## 2022-06-16 RX ORDER — INSULIN LISPRO 100/ML
VIAL (ML) SUBCUTANEOUS AT BEDTIME
Refills: 0 | Status: DISCONTINUED | OUTPATIENT
Start: 2022-06-16 | End: 2022-06-21

## 2022-06-16 RX ORDER — HEPARIN SODIUM 5000 [USP'U]/ML
5000 INJECTION INTRAVENOUS; SUBCUTANEOUS EVERY 12 HOURS
Refills: 0 | Status: DISCONTINUED | OUTPATIENT
Start: 2022-06-16 | End: 2022-06-21

## 2022-06-16 RX ORDER — ONDANSETRON 8 MG/1
4 TABLET, FILM COATED ORAL EVERY 8 HOURS
Refills: 0 | Status: DISCONTINUED | OUTPATIENT
Start: 2022-06-16 | End: 2022-06-21

## 2022-06-16 RX ORDER — CLOPIDOGREL BISULFATE 75 MG/1
75 TABLET, FILM COATED ORAL DAILY
Refills: 0 | Status: DISCONTINUED | OUTPATIENT
Start: 2022-06-16 | End: 2022-06-20

## 2022-06-16 RX ORDER — INSULIN LISPRO 100/ML
VIAL (ML) SUBCUTANEOUS
Refills: 0 | Status: DISCONTINUED | OUTPATIENT
Start: 2022-06-16 | End: 2022-06-21

## 2022-06-16 RX ORDER — CALCIUM ACETATE 667 MG
667 TABLET ORAL
Refills: 0 | Status: DISCONTINUED | OUTPATIENT
Start: 2022-06-16 | End: 2022-06-21

## 2022-06-16 RX ORDER — DILTIAZEM HCL 120 MG
60 CAPSULE, EXT RELEASE 24 HR ORAL EVERY 8 HOURS
Refills: 0 | Status: DISCONTINUED | OUTPATIENT
Start: 2022-06-16 | End: 2022-06-21

## 2022-06-16 RX ORDER — GLUCAGON INJECTION, SOLUTION 0.5 MG/.1ML
1 INJECTION, SOLUTION SUBCUTANEOUS ONCE
Refills: 0 | Status: DISCONTINUED | OUTPATIENT
Start: 2022-06-16 | End: 2022-06-21

## 2022-06-16 RX ORDER — CEFTRIAXONE 500 MG/1
1000 INJECTION, POWDER, FOR SOLUTION INTRAMUSCULAR; INTRAVENOUS EVERY 24 HOURS
Refills: 0 | Status: DISCONTINUED | OUTPATIENT
Start: 2022-06-16 | End: 2022-06-21

## 2022-06-16 RX ORDER — PANTOPRAZOLE SODIUM 20 MG/1
40 TABLET, DELAYED RELEASE ORAL
Refills: 0 | Status: DISCONTINUED | OUTPATIENT
Start: 2022-06-16 | End: 2022-06-21

## 2022-06-16 RX ADMIN — PANTOPRAZOLE SODIUM 40 MILLIGRAM(S): 20 TABLET, DELAYED RELEASE ORAL at 09:13

## 2022-06-16 RX ADMIN — Medication 667 MILLIGRAM(S): at 18:40

## 2022-06-16 RX ADMIN — ATORVASTATIN CALCIUM 40 MILLIGRAM(S): 80 TABLET, FILM COATED ORAL at 21:55

## 2022-06-16 RX ADMIN — Medication 0.1 MILLIGRAM(S): at 05:49

## 2022-06-16 RX ADMIN — Medication 81 MILLIGRAM(S): at 11:41

## 2022-06-16 RX ADMIN — Medication 2: at 12:04

## 2022-06-16 RX ADMIN — CLOPIDOGREL BISULFATE 75 MILLIGRAM(S): 75 TABLET, FILM COATED ORAL at 11:40

## 2022-06-16 RX ADMIN — Medication 667 MILLIGRAM(S): at 11:43

## 2022-06-16 RX ADMIN — Medication 667 MILLIGRAM(S): at 09:13

## 2022-06-16 RX ADMIN — Medication 50 MILLIGRAM(S): at 11:41

## 2022-06-16 RX ADMIN — AMLODIPINE BESYLATE 5 MILLIGRAM(S): 2.5 TABLET ORAL at 05:55

## 2022-06-16 RX ADMIN — Medication 100 MILLIGRAM(S): at 05:48

## 2022-06-16 RX ADMIN — Medication 250 MILLIGRAM(S): at 18:40

## 2022-06-16 RX ADMIN — Medication 60 MILLIGRAM(S): at 13:35

## 2022-06-16 RX ADMIN — Medication 0.1 MILLIGRAM(S): at 18:41

## 2022-06-16 RX ADMIN — Medication 60 MILLIGRAM(S): at 21:55

## 2022-06-16 RX ADMIN — Medication 60 MILLIGRAM(S): at 05:48

## 2022-06-16 RX ADMIN — Medication 100 MILLIGRAM(S): at 18:42

## 2022-06-16 RX ADMIN — CEFTRIAXONE 100 MILLIGRAM(S): 500 INJECTION, POWDER, FOR SOLUTION INTRAMUSCULAR; INTRAVENOUS at 13:35

## 2022-06-16 RX ADMIN — INSULIN GLARGINE 12 UNIT(S): 100 INJECTION, SOLUTION SUBCUTANEOUS at 21:55

## 2022-06-16 RX ADMIN — HEPARIN SODIUM 5000 UNIT(S): 5000 INJECTION INTRAVENOUS; SUBCUTANEOUS at 18:43

## 2022-06-16 RX ADMIN — HEPARIN SODIUM 5000 UNIT(S): 5000 INJECTION INTRAVENOUS; SUBCUTANEOUS at 05:49

## 2022-06-16 NOTE — H&P ADULT - NSICDXPASTMEDICALHX_GEN_ALL_CORE_FT
Comment: Ddx: mixed subungual infection(Gm (-) infection with saprophytic vs. dermatophytic componet)—Photo of nail taken today-hx from last May suggestive of a componet of a paronychia as well, but no e/o that today. Detail Level: Detailed Comment: S/P Ln2 tx PAST MEDICAL HISTORY:  Anemia secondary to renal failure     CAD (coronary artery disease)     CRF (chronic renal failure), unspecified stage     Depression     Diabetes mellitus II     Dialysis patient     h/o Anxiety attack     h/o Hepatitis A 1969 currently resolved, no symptoms    h/o Myocardial infarct 2007     h/o Smoking quitted 3/2012    HTN (hypertension)     HTN (hypertension)     Murmur, cardiac     PAD (peripheral artery disease)

## 2022-06-16 NOTE — PROGRESS NOTE ADULT - SUBJECTIVE AND OBJECTIVE BOX
Patient is a 71y Female whom presented to the hospital with esrd on hd     PAST MEDICAL & SURGICAL HISTORY:  Diabetes mellitus II      HTN (hypertension)      h/o Anxiety attack      Depression      h/o Myocardial infarct       CAD (coronary artery disease)      h/o Hepatitis A 1969  currently resolved, no symptoms      PAD (peripheral artery disease)      Murmur, cardiac      h/o Smoking  quitted 3/2012      CRF (chronic renal failure), unspecified stage      Dialysis patient      Anemia secondary to renal failure      HTN (hypertension)      coronary stent       s/p Ovarian cyst removal      s/p surgical removal of benign Skin lesion epigastric area          MEDICATIONS  (STANDING):  amLODIPine   Tablet 5 milliGRAM(s) Oral daily  aspirin enteric coated 81 milliGRAM(s) Oral daily  atorvastatin 40 milliGRAM(s) Oral at bedtime  calcium acetate 667 milliGRAM(s) Oral three times a day with meals  cefTRIAXone   IVPB 1000 milliGRAM(s) IV Intermittent every 24 hours  cloNIDine 0.1 milliGRAM(s) Oral two times a day  clopidogrel Tablet 75 milliGRAM(s) Oral daily  dextrose 5%. 1000 milliLiter(s) (100 mL/Hr) IV Continuous <Continuous>  dextrose 5%. 1000 milliLiter(s) (50 mL/Hr) IV Continuous <Continuous>  dextrose 50% Injectable 25 Gram(s) IV Push once  dextrose 50% Injectable 12.5 Gram(s) IV Push once  dextrose 50% Injectable 25 Gram(s) IV Push once  diltiazem    Tablet 60 milliGRAM(s) Oral every 8 hours  glucagon  Injectable 1 milliGRAM(s) IntraMuscular once  heparin   Injectable 5000 Unit(s) SubCutaneous every 12 hours  imipramine 50 milliGRAM(s) Oral daily  insulin glargine Injectable (LANTUS) 12 Unit(s) SubCutaneous at bedtime  insulin lispro (ADMELOG) corrective regimen sliding scale   SubCutaneous three times a day before meals  insulin lispro (ADMELOG) corrective regimen sliding scale   SubCutaneous at bedtime  metoprolol tartrate 100 milliGRAM(s) Oral every 12 hours  pantoprazole    Tablet 40 milliGRAM(s) Oral before breakfast  povidone iodine 10% Solution 1 Application(s) Topical daily      Allergies    latex (Unknown)  No Known Drug Allergies    Intolerances        SOCIAL HISTORY:  Denies ETOh,Smoking,     FAMILY HISTORY:  No pertinent family history in first degree relatives        REVIEW OF SYSTEMS:    CONSTITUTIONAL: No weakness, fevers or chills  RESPIRATORY: No cough, wheezing, hemoptysis; No shortness of breath  CARDIOVASCULAR: No chest pain or palpitations  GASTROINTESTINAL: No abdominal or epigastric pain. No nausea, vomiting,     No diarrhea or constipation. No melena   SKIN: dry      VITAL:  T(C): , Max: 36.8 (22 @ 18:34)  T(F): , Max: 98.2 (22 @ 18:34)  HR: 66 (22 @ 21:08)  BP: 122/58 (22 @ 21:08)  BP(mean): --  RR: 16 (22 @ 21:08)  SpO2: 93% (22 @ 21:08)  Wt(kg): --    I and O's:     @ 07:01  -   @ 22:31  --------------------------------------------------------  IN: 0 mL / OUT: 2000 mL / NET: -2000 mL          PHYSICAL EXAM:    Constitutional: NAD  HEENT: conjunctive   clear   Neck:  No JVD  Respiratory: CTAB  Cardiovascular: S1 and S2  Gastrointestinal: BS+, soft, NT/ND  Extremities: No peripheral edema  , pos toe bandage   Neurological:  no focal deficits  Psychiatric: Normal mood, normal affect  : No Renteria  Skin: No rashes  Access: pos fistula     LABS:                        11.1   16.09 )-----------( 284      ( 2022 11:57 )             35.1         136  |  97  |  56<H>  ----------------------------<  244<H>  4.5   |  29  |  5.40<H>    Ca    8.9      2022 11:57    TPro  6.3  /  Alb  2.7<L>  /  TBili  0.5  /  DBili  x   /  AST  26  /  ALT  27  /  AlkPhos  116        Urine Studies:  Urinalysis Basic - ( 15 Justin 2022 23:20 )    Color: Yellow / Appearance: Clear / S.010 / pH: x  Gluc: x / Ketone: Small  / Bili: Negative / Urobili: 1   Blood: x / Protein: Negative / Nitrite: Negative   Leuk Esterase: Negative / RBC: x / WBC x   Sq Epi: x / Non Sq Epi: x / Bacteria: x            RADIOLOGY & ADDITIONAL STUDIES:

## 2022-06-16 NOTE — PATIENT PROFILE ADULT - FALL HARM RISK - HARM RISK INTERVENTIONS

## 2022-06-16 NOTE — CONSULT NOTE ADULT - SUBJECTIVE AND OBJECTIVE BOX
Vascular Attending:  Dr Miller      HPI:  Patient is a 71 year old female with PMH of A.fib rate controlled not on AC for hx of multiple falls, T2DM, HTN, HLD, ESRD on HD, CHF, s/p CABG brought in by EMS for generalized weakness at home. Patient states that she was doing well when in the afternoon she tried to get up from her couch and felt weak in the LE and fell back in her couch. Denies any hx of head trauma or loss of consciousness. Denies any weakness or numbness. Denies any chest pain, SOB or palpitations. No fever, cough or chills. No hx of recent travel or sick contacts. At the time of EMS arrival patient was found to have POCBS of 50. EMS gave dextrose and on arrival to the ED patient blood sugar was found to be in 30's with hypothermia of 94.9.    Vascular Hx: pt with R-->L bifem-fem BK pop bypass in past by Dr Downing. Pt reports she has had multiple falls and she has had multiple injuries related to including wounds on her feet? She was seen as a consult by Vascular Surgery in 2022 while admitted to [facility] and followed up with podiatry. SHe was asked to follow up with vascular, however did not follow up. She has h/o noncompliance and missed HD sessions and states that she has had trouble at home living by herself as her  had a stroke and is not home with her. Her son assists with her needs. She has difficulty with ambulation sec to weakness; uses a RW and has had multiple falls. She has c/o cramps in her RLE but not the L.      PAST MEDICAL & SURGICAL HISTORY:  Diabetes mellitus II  HTN (hypertension)  h/o Anxiety attack  Depression  h/o Myocardial infarct   CAD (coronary artery disease)  h/o Hepatitis A   currently resolved, no symptoms  PAD (peripheral artery disease)  Murmur, cardiac  h/o Smoking  quitted 3/2012  CRF (chronic renal failure), unspecified stage  Dialysis patient  Anemia secondary to renal failure  HTN (hypertension)  coronary stent   s/p Ovarian cyst removal  s/p surgical removal of benign Skin lesion epigastric area  Bifem bypass with fem-BK pop bypass        REVIEW OF SYSTEMS  General: fevers, chills, fatigue  Skin/Breast: denies	  Ophthalmologic: denies	  ENMT: denies  Respiratory and Thorax: denies cough, sputum, SOB or orthopnea	  Cardiovascular:	denies CP or palps  Gastrointestinal:	denies abd pain/N/V  Genitourinary: makes little urine  Musculoskeletal:	unstaedy gait; R foot pain x 6months  Neurological: unsteady gait, generalized weakness  Psychiatric: anxiety  Hematology/Lymphatics:	 anemia  Endocrine: brittle DM  Allergic/Immunologic:	    MEDICATIONS  (STANDING):  amLODIPine   Tablet 5 milliGRAM(s) Oral daily  aspirin enteric coated 81 milliGRAM(s) Oral daily  atorvastatin 40 milliGRAM(s) Oral at bedtime  calcium acetate 667 milliGRAM(s) Oral three times a day with meals  cefTRIAXone   IVPB 1000 milliGRAM(s) IV Intermittent every 24 hours  cloNIDine 0.1 milliGRAM(s) Oral two times a day  clopidogrel Tablet 75 milliGRAM(s) Oral daily  dextrose 5%. 1000 milliLiter(s) (100 mL/Hr) IV Continuous <Continuous>  dextrose 5%. 1000 milliLiter(s) (50 mL/Hr) IV Continuous <Continuous>  dextrose 50% Injectable 25 Gram(s) IV Push once  dextrose 50% Injectable 12.5 Gram(s) IV Push once  dextrose 50% Injectable 25 Gram(s) IV Push once  diltiazem    Tablet 60 milliGRAM(s) Oral every 8 hours  glucagon  Injectable 1 milliGRAM(s) IntraMuscular once  heparin   Injectable 5000 Unit(s) SubCutaneous every 12 hours  imipramine 50 milliGRAM(s) Oral daily  insulin glargine Injectable (LANTUS) 12 Unit(s) SubCutaneous at bedtime  insulin lispro (ADMELOG) corrective regimen sliding scale   SubCutaneous three times a day before meals  insulin lispro (ADMELOG) corrective regimen sliding scale   SubCutaneous at bedtime  metoprolol tartrate 100 milliGRAM(s) Oral every 12 hours  pantoprazole    Tablet 40 milliGRAM(s) Oral before breakfast  vancomycin  IVPB 1000 milliGRAM(s) IV Intermittent once    MEDICATIONS  (PRN):  acetaminophen     Tablet .. 650 milliGRAM(s) Oral every 6 hours PRN Temp greater or equal to 38C (100.4F), Mild Pain (1 - 3)  aluminum hydroxide/magnesium hydroxide/simethicone Suspension 30 milliLiter(s) Oral every 4 hours PRN Dyspepsia  dextrose Oral Gel 15 Gram(s) Oral once PRN Blood Glucose LESS THAN 70 milliGRAM(s)/deciliter  melatonin 3 milliGRAM(s) Oral at bedtime PRN Insomnia  ondansetron Injectable 4 milliGRAM(s) IV Push every 8 hours PRN Nausea and/or Vomiting      Allergies    latex (Unknown)  No Known Drug Allergies    SOCIAL HISTORY: , lives alone. Reports  had stroke and is in rehab but she doesnt know where. Former smoker 1.5 ppd x 50 years. Quit       Vital Signs Last 24 Hrs  T(C): 36.7 (2022 12:21), Max: 36.7 (2022 05:25)  T(F): 98 (2022 12:21), Max: 98.1 (2022 05:25)  HR: 76 (2022 12:21) (75 - 77)  BP: 131/56 (2022 12:21) (131/56 - 178/86)  BP(mean): --  RR: 18 (2022 12:21) (17 - 18)  SpO2: 93% (2022 12:21) (92% - 99%)    PHYSICAL EXAM:  Constitutional: Thin frail, elderly F in NAD  Eyes: PERRL  ENMT:WNL  Neck:No JVD or carotid bruits  Respiratory: diminished R base >L  Cardiovascular: normal S1, S2 soft syst murmur  Gastrointestinal: soft, ND, NT, + BS, no pulsatile masses  Extremities: R great toe distal tip dry gangrene with macerated base and periwound erythema, +malodor, no crepitus. 2nd toe distal with dry gangrene. L inner maleolar with 2x2 cm circular wound with granulated base and hyperkeritotic edges. R groin incision well healed; L groin fem-BK pop incision well healed. R radiocephalic AVF + bruit, +thrill  Neurological: A&O x 3, speech fluent, MUHAMMAD X 4  Psychiatric: Anxious  Pulses:   Right:                                                                          Left:  FEM [ ]2+ [ ]1+ [ ]doppler                                           FEM [x ]2+ [ ]1+ [ ]doppler    POP [ ]2+ [ ]1+ [x ]doppler                                          POP [x ]2+ [ ]1+ [ ]doppler    DP [ ]2+ [ ]1+ [x ]doppler                                            DP [x ]2+ [ ]1+ [ ]doppler  PT[ ]2+ [ ]1+ [ x]doppler                                             PT [ x]2+ [ ]1+ [ ]doppler      LABS:                        11.1   16.09 )-----------( 284      ( 2022 11:57 )             35.1     06-16    136  |  97  |  56<H>  ----------------------------<  244<H>  4.5   |  29  |  5.40<H>    Ca    8.9      2022 11:57    TPro  6.3  /  Alb  2.7<L>  /  TBili  0.5  /  DBili  x   /  AST  26  /  ALT  27  /  AlkPhos  116  06-16    PT/INR - ( 15 Justin 2022 22:45 )   PT: 13.4 sec;   INR: 1.14 ratio         PTT - ( 15 Justin 2022 22:45 )  PTT:27.3 sec  Urinalysis Basic - ( 15 Justin 2022 23:20 )    Color: Yellow / Appearance: Clear / S.010 / pH: x  Gluc: x / Ketone: Small  / Bili: Negative / Urobili: 1   Blood: x / Protein: Negative / Nitrite: Negative   Leuk Esterase: Negative / RBC: x / WBC x   Sq Epi: x / Non Sq Epi: x / Bacteria: x        RADIOLOGY & ADDITIONAL STUDIES    < from: CT Angio Abd Aorta w/run-off w/ IV Cont (22 @ 09:49) >  ACC: [id number] EXAM:  CT ANGIO ABD AOR W RUN(W)AW IC                          PROCEDURE DATE:  2022          INTERPRETATION:  CLINICAL INFORMATION: Right lower extremity iliac   disease. Right lower extremity bypass graft.    COMPARISON: CT chest 2019 and CTA runoff 2012. Arterial   Doppler 2022.    CONTRAST/COMPLICATIONS:  IV Contrast: Omnipaque 350  90 cc administered   10 cc discarded  Oral Contrast: NONE  Complications: None reported at time of study completion    CT ANGIOGRAM ABDOMEN, PELVIS, AND LOWER EXTREMITIES:    PROCEDURE:  Initially, nonenhanced CT was obtained through the calves. Then,   following the rapid administration of intravenous contrast, CT   angiography was performed through the abdomen, pelvis, and lower   extremities down to the toes.  Delayed images through the calves were   also obtained. Sagittal and coronal reformats as well as 3D   reconstructions were performed.    FINDINGS:    CENTRAL ARTERIAL SYSTEM:    Abdominal aorta normal in caliber of mild to moderate calcified plaque.    Patent celiac artery with moderate narrowing at the origin. Patent   superior mesenteric artery with moderate to severe narrowing at the   origin. Moderate to severe plaque at the origin of both renal arteries.   Both renal arteries are small/attenuated. Inferior mesenteric artery is   patent.    Long segment occlusion of the right common iliac and external iliac   arteries with reconstitution just proximal to the common femoral artery.   Heavily calcified plaque at the origin of the right internal iliac artery   which is likely occluded with reconstitution distally.    Patent left common iliac artery with mild to moderate plaque. Patent left   external iliac artery with mild plaque. Left internal iliac artery is   patent with atherosclerotic changes.    RIGHT LOWER EXTREMITY:    Patent bifemoral bypass graft. The right common femoral artery. Extensive   atherosclerotic changes in the right superficial femoral artery with   multifocal high-grade stenoses or occlusions including proximally (series   4 image 358) and multiple foci distally including series 4 image 530).   Profunda femoral artery is patent with atherosclerotic changes    Multifocal severe narrowing of the popliteal artery.    Heavily calcified calf arteries which cannot be assessed secondary to the   extensive calcified plaque.    Multiple collaterals are noted in the thigh and calf.    LEFT LOWER EXTREMITY:    Patent common femoral artery. Extensive atherosclerotic changes in the   left superficial femoral artery with multifocal high-grade stenoses or   occlusions. Profunda femoral artery is patent with atherosclerotic   changes. Bypass graft from the left superficial femoral artery in the   thigh to tibioperoneal trunkwhich is patent. Severe multifocal   high-grade stenoses or occlusions of the popliteal artery.    High-grade stenosis of the tibioperoneal trunk distal to the takeoff of   the anterior tibial artery. Heavily calcified calf arteries limiting   evaluation. Peroneal artery appears patent at the ankle; posterior tibial   and anterior tibial arteries cannot be assessed secondary to the   calcified plaque.    Multiple collaterals are noted in the thigh and calf.    ADDITIONAL FINDINGS:    FINDINGS:  LOWER CHEST: Small bilateral pleural effusions with compressive   atelectasis in the lower lobes. Coronary artery calcifications.    LIVER: Calcified granuloma at the inferior aspect right hepatic lobe,   otherwise unremarkable.  BILE DUCTS: Normal caliber.  GALLBLADDER: Within normal limits.  SPLEEN: Within normal limits.  PANCREAS: Within normal limits.  ADRENALS: Within normal limits.  KIDNEYS/URETERS: 1.5 cm left adrenal nodule, not significantly changed.   Unremarkable right adrenal gland.    BLADDER: Unremarkable.  REPRODUCTIVE ORGANS: Uterus and adnexa are unremarkable.    BOWEL: Moderate amount retained fecal material in the colon, greatest in   the cecum. No evidence of a bowel obstruction. Appendix is normal.  PERITONEUM: No ascites.  RETROPERITONEUM/LYMPH NODES: No lymphadenopathy.  ABDOMINAL WALL: Unremarkable.  BONES: No aggressive osseous lesion. Degenerative changes in the spine.    IMPRESSION:    Patent bifemoral and left superficial femoral to tibioperoneal trunk   bypass grafts.    Long segment occlusion of the right common and external iliac arteries   with reconstitution at the right superficial femoral artery.    Severe atherosclerotic changes involving the superficial femoral and   popliteal arteries bilaterally with multifocal high-grade stenoses or   occlusions.    Heavily calcified calf arteries which cannot be assessed secondary to the   calcified plaque.    Given the degree of calcified plaque, an MRA may be able to better   evaluate the lower extremity arteries.

## 2022-06-16 NOTE — CHART NOTE - NSCHARTNOTEFT_GEN_A_CORE
Chart reviewed. Patient is a 71 year old female with PMH of A.fib rate controlled not on AC for hx of multiple falls, T2DM, HTN, HLD, ESRD on HD, CHF, s/p CABG brought in by EMS for generalized weakness at home and was found to be hypoglycemic and meets SIRS criteria        Problem/Plan - 1:  ·  Problem: Hypoglycemia.  ·  Plan: - Patient presented with c/o generalized weakness and fall and was found to have blood sugar in 30's  - S/p Dextrose 50% IV X1 in the ED and D5 NS 1L X1 with appropriate response  - Patient with hx of T2DM, on Lantus 40 units qhs not on any oral hypoglycemics per outpatient pharmacy   - [facility]  - endocrine consulted.     Problem/Plan - 2:  ·  Problem: SIRS (systemic inflammatory response syndrome).  ·  Plan: - Patient presented to the ED with the c/o Generalized weakness and possible fall and was found to have hypothermia 94.9 and leukocytosis of 16.82  - Patient meets SIRS criteria - No clear source of infection  - UA unremarkable, no abdominal pain, CXR with no infiltrates seen on wet read   - S/P ceftriaxone in the ED - will monitor off Abx  - Patient was found to be hypoglycemic on arrival to the ED with POCS in 30's  - Hypothermia and leukocytosis likely reactive in the setting of hypoglycemia  - S/p Dextrose 50% IV X1 in the ED and D5 NS 1L X1 with appropriate response  - will monitor off Abx pending ID recommendations   - CT BRAIN: No acute intracranial bleeding. Central volume loss, chronic microvascular ischemic changes, and chronic bilateral lacunar infarctions.  - CT CERVICAL SPINE: No fracture. Grade 1 C4-C5 anterolisthesis. C6-C7 disc degeneration. Bilateral pleural effusions and interlobular septal thickening due to interstitial edema.  - CXR: no clear evidence of PNA  - F/U CT chest   - Trend WBC and monitor for fever  - F/u UA, UCx, BCx x2  - Lactate 2 on admission, f/u repeat lactate   - ID Dr. Tsai consulted, will appreciate recs.     Problem/Plan - 3:  ·  Problem: Pleural effusion.   ·  Plan: - No Hx of pulmonary disease  - Patient does not c/o cough, fever or chills  - CT Cervical shoes Bilateral pleural effusions and interlobular septal thickening due to interstitial edema.  - F/U dedicated CT chest  - s/p ceftriaxone in the ED - will monitor off Abx pending  ct results  - ID Dr. Tsai, consulted, will appreciate recs.     Problem/Plan - 4:  ·  Problem: HFrEF (heart failure with reduced ejection fraction).   ·  Plan: - patient with hx of HFrEF  - last ECHO in 04/22 shows EF of 45%  - Admission labs show ProBNP of [id number]  - Likely elevated in the setting of ESRD  - Consult Dr. James, will appreciate recs   - Avoid excessive fluids.     Problem/Plan - 5:  ·  Problem: Elevated troponin.   ·  Plan: - Admission labs show elevated Troponin of 275.9  - Patient denies any chest pain, sob or palpitations  - EKG shows NSR with left axis deviation with non specific ST and T waves changes  - elevated troponin likely due to ESRD  - trend x3 or to peak.     Problem/Plan - 6:  ·  Problem: ESRD on hemodialysis.   ·  Plan: - Patient with hx of ESRD  - On HD TTS schedule  - admission eGFR 9  - Follows Dr. Edwards as out patient, will consult Dr. Edwards, appreciate recs.     Problem/Plan - 7:  ·  Problem: T2DM (type 2 diabetes mellitus).   ·  Plan: - Chronic stable  - On Lantus 40 qhs  - Will start on lantus 20 units qhs with LDSS given hypoglycemic episode  - Accu checks AC/HS  - F/U A1C  - Adjust insulin requirement based on blood sugar levels  - per outpatient pharmacy patient recently rx ademolog however has not yet picked up and pharmacy unsure of dose   - endocrinology consulted, Dr. Perlman.     Problem/Plan - 8:  ·  Problem: Atrial fibrillation.   ·  Plan: - Patient with hx of P A.Fib  - Not on any AC because of hx of multiple falls   - on Lopressor 100mg q12 and Diltiazem 60mg q8 - will continue with hold parameters  - EKG shows NSR with left axis deviation with non specific ST and T waves changes.     Problem/Plan - 9:  ·  Problem: Benign essential HTN.   ·  Plan: - Chronic stable  - VSS  - Continue Amlodipine 5 mg with hold parameters  - Dash/Renal Diet  - continue to monitor routine hemodynamics.     Problem/Plan - 10:  ·  Problem: HLD (hyperlipidemia).   ·  Plan; - Chronic stable  - On atorvastatin 40mg at home - will continue  - Dash/Renal diet.     Problem/Plan - 11:  ·  Problem: Depression, major.   ·  Plan: - Chronic stable  - Continue imipramine 50qhs.     Problem/Plan - 12:  ·  Problem: Need for prophylactic measure.   ·  Plan: - DVT Prophylaxis: Heparin 5000 units q12. Chart reviewed. Patient is a 71 year old female with PMH of A.fib rate controlled not on AC for hx of multiple falls, T2DM, HTN, HLD, ESRD on HD, CHF, s/p CABG brought in by EMS for generalized weakness at home and was found to be hypoglycemic and met SIRS criteria     6/16/22 - on exam, pt has dry gangrene on R great hallux, poor toenail hygiene -- will likely need amputation of great toe -- pods, vascular, ID consulted -- started on rocephin, given 1 dose vanco as well. Lantus dec to 10 units qhs by endocrine. HD per nephro. Trops downtrended, was likely elevated from ESRD.       Problem/Plan - 1:  ·  Problem: Hypoglycemia.  ·  Plan: - Patient presented with c/o generalized weakness and fall and was found to have blood sugar in 30's  - S/p Dextrose 50% IV X1 in the ED and D5 NS 1L X1 with appropriate response  - Patient with hx of T2DM, on Lantus 40 units qhs not on any oral hypoglycemics per outpatient pharmacy   [facility]  - endocrine consulted.     Problem/Plan - 2:  ·  Problem: SIRS (systemic inflammatory response syndrome).  ·  Plan: - Patient presented to the ED with the c/o Generalized weakness and possible fall and was found to have hypothermia 94.9 and leukocytosis of 16.82  - Patient meets SIRS criteria -   - UA unremarkable, no abdominal pain, CXR with no infiltrates seen on wet read   - S/P ceftriaxone in the ED - will monitor off Abx  - Patient was found to be hypoglycemic on arrival to the ED with POCS in 30's  - Hypothermia and leukocytosis likely reactive in the setting of hypoglycemia  - S/p Dextrose 50% IV X1 in the ED and D5 NS 1L X1 with appropriate response  - will monitor off Abx pending ID recommendations   - CT BRAIN: No acute intracranial bleeding. Central volume loss, chronic microvascular ischemic changes, and chronic bilateral lacunar infarctions.  - CT CERVICAL SPINE: No fracture. Grade 1 C4-C5 anterolisthesis. C6-C7 disc degeneration. Bilateral pleural effusions and interlobular septal thickening due to interstitial edema.  - CXR: no clear evidence of PNA  - F/U CT chest   - Trend WBC and monitor for fever  - F/u UA, UCx, BCx x2  - Lactate 2 on admission, f/u repeat lactate   - ID Dr. Tsai consulted, will appreciate recs.     Problem/Plan - 3:  ·  Problem: Pleural effusion.   ·  Plan: - No Hx of pulmonary disease  - Patient does not c/o cough, fever or chills  - CT Cervical shoes Bilateral pleural effusions and interlobular septal thickening due to interstitial edema.  - F/U dedicated CT chest  - s/p ceftriaxone in the ED - will monitor off Abx pending  ct results  - ID Dr. Tsai, consulted, will appreciate recs.     Problem/Plan - 4:  ·  Problem: HFrEF (heart failure with reduced ejection fraction).   ·  Plan: - patient with hx of HFrEF  - last ECHO in 04/22 shows EF of 45%  - Admission labs show ProBNP of 830412  - Likely elevated in the setting of ESRD  - Consult Dr. James, will appreciate recs   - Avoid excessive fluids.     Problem/Plan - 5:  ·  Problem: Elevated troponin.   ·  Plan: - Admission labs show elevated Troponin of 275.9  - Patient denies any chest pain, sob or palpitations  - EKG shows NSR with left axis deviation with non specific ST and T waves changes  - elevated troponin likely due to ESRD  - trend x3 or to peak.     Problem/Plan - 6:  ·  Problem: ESRD on hemodialysis.   ·  Plan: - Patient with hx of ESRD  - On HD TTS schedule  - admission eGFR 9  - Follows Dr. Edwards as out patient, will consult Dr. Edwards, appreciate recs.     Problem/Plan - 7:  ·  Problem: T2DM (type 2 diabetes mellitus).   ·  Plan: - Chronic stable  - On Lantus 40 qhs  - Will start on lantus 20 units qhs with LDSS given hypoglycemic episode  - Accu checks AC/HS  - F/U A1C  - Adjust insulin requirement based on blood sugar levels  - per outpatient pharmacy patient recently rx ademolog however has not yet picked up and pharmacy unsure of dose   - endocrinology consulted, Dr. Perlman.     Problem/Plan - 8:  ·  Problem: Atrial fibrillation.   ·  Plan: - Patient with hx of P A.Fib  - Not on any AC because of hx of multiple falls   - on Lopressor 100mg q12 and Diltiazem 60mg q8 - will continue with hold parameters  - EKG shows NSR with left axis deviation with non specific ST and T waves changes.     Problem/Plan - 9:  ·  Problem: Benign essential HTN.   ·  Plan: - Chronic stable  - VSS  - Continue Amlodipine 5 mg with hold parameters  - Dash/Renal Diet  - continue to monitor routine hemodynamics.     Problem/Plan - 10:  ·  Problem: HLD (hyperlipidemia).   ·  Plan; - Chronic stable  - On atorvastatin 40mg at home - will continue  - Dash/Renal diet.     Problem/Plan - 11:  ·  Problem: Depression, major.   ·  Plan: - Chronic stable  - Continue imipramine 50qhs.     Problem/Plan - 12:  ·  Problem: Need for prophylactic measure.   ·  Plan: - DVT Prophylaxis: Heparin 5000 units q12.

## 2022-06-16 NOTE — H&P ADULT - PROBLEM SELECTOR PLAN 8
- Patient with hx of P A.Fib  - Not on any AC because of hx of multiple falls   - on Lopressor 100mg q12 and Diltiazem 60mg q8 - will continue with hold parameters  - EKG shows NSR with left axis deviation with non specific ST and T waves changes

## 2022-06-16 NOTE — CONSULT NOTE ADULT - SUBJECTIVE AND OBJECTIVE BOX
[facility]  INFECTIOUS DISEASES   [address] [place] [address]  Tel: [phone]     Fax: [phone]  ======================================================  MD Zita Alejandra Kaushal, MD Cho, Michelle, MD   ======================================================    MRN-[id number]  SHILO KOHLER     CC: Weakness     HPI:  70 yo woman with PMH of HTN, DM2, CAD, CHF, A.fib, multiple falls and ESRD on HD was admitted on , came to ED with generalized weakness. Yesterday she was unable to stand up yesterday and fell on the couch so was brought to ED.  She had hypoglycemia and hypothermia on admission so ID was called for possible sepsis/SIRS.   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 She had Tissue cultures from 3/30/22 grew klebsiella oxytoca/raoutella oxytoca, E. coli, MSSA so Cefazolin was given after HD to complete the course of treatment.   Today she has no complaint, feels weak but no chest pain, SOB, cough, abdominal pain, N/V/D/C or dysuria.     PAST MEDICAL & SURGICAL HISTORY:  Diabetes mellitus II  HTN (hypertension)  h/o Anxiety attack  Depression  h/o Myocardial infarct   CAD (coronary artery disease)  h/o Hepatitis A   currently resolved, no symptoms  PAD (peripheral artery disease)  Murmur, cardiac  h/o Smoking  quitted 3/2012  CRF (chronic renal failure), unspecified stage  Dialysis patient  Anemia secondary to renal failure  HTN (hypertension)  coronary stent   s/p Ovarian cyst removal  s/p surgical removal of benign Skin lesion epigastric area    Social Hx: no current smoking, ETOH or drugs     FAMILY HISTORY:  No pertinent family history in first degree relatives    Allergies  latex (Unknown)  No Known Drug Allergies    Antibiotics:  MEDICATIONS  (STANDING):  amLODIPine   Tablet 5 milliGRAM(s) Oral daily  aspirin enteric coated 81 milliGRAM(s) Oral daily  atorvastatin 40 milliGRAM(s) Oral at bedtime  calcium acetate 667 milliGRAM(s) Oral three times a day with meals  cloNIDine 0.1 milliGRAM(s) Oral two times a day  clopidogrel Tablet 75 milliGRAM(s) Oral daily  dextrose 5%. 1000 milliLiter(s) (100 mL/Hr) IV Continuous <Continuous>  dextrose 5%. 1000 milliLiter(s) (50 mL/Hr) IV Continuous <Continuous>  dextrose 50% Injectable 25 Gram(s) IV Push once  dextrose 50% Injectable 12.5 Gram(s) IV Push once  dextrose 50% Injectable 25 Gram(s) IV Push once  diltiazem    Tablet 60 milliGRAM(s) Oral every 8 hours  glucagon  Injectable 1 milliGRAM(s) IntraMuscular once  heparin   Injectable 5000 Unit(s) SubCutaneous every 12 hours  imipramine 50 milliGRAM(s) Oral daily  insulin glargine Injectable (LANTUS) 12 Unit(s) SubCutaneous at bedtime  insulin lispro (ADMELOG) corrective regimen sliding scale   SubCutaneous three times a day before meals  insulin lispro (ADMELOG) corrective regimen sliding scale   SubCutaneous at bedtime  metoprolol tartrate 100 milliGRAM(s) Oral every 12 hours  pantoprazole    Tablet 40 milliGRAM(s) Oral before breakfast     REVIEW OF SYSTEMS:  CONSTITUTIONAL:  No Fever or chills  HEENT:  No diplopia or blurred vision.  No sore throat or runny nose.  CARDIOVASCULAR:  No chest pain or SOB.  RESPIRATORY:  No cough, shortness of breath, PND or orthopnea.  GASTROINTESTINAL:  No nausea, vomiting or diarrhea.  GENITOURINARY:  No dysuria, frequency or urgency. No Blood in urine  MUSCULOSKELETAL:  no joint aches, no muscle pain  SKIN:  No change in skin, hair or nails.  NEUROLOGIC:  No paresthesias, fasciculations, seizures or weakness.  PSYCHIATRIC:  No disorder of thought or mood.  ENDOCRINE:  No heat or cold intolerance, polyuria or polydipsia.  HEMATOLOGICAL:  No easy bruising or bleeding.     Physical Exam:  Vital Signs Last 24 Hrs  T(C): 36.7 (2022 06:24), Max: 36.7 (2022 05:25)  T(F): 98.1 (2022 06:24), Max: 98.1 (2022 05:25)  HR: 77 (2022 06:24) (75 - 77)  BP: 149/73 (2022 06:24) (149/73 - 178/86)  RR: 18 (2022 06:24) (17 - 18)  SpO2: 92% (2022 06:24) (92% - 99%)  Height (cm): 175.3 (06-15 @ 20:30)  Weight (kg): 60.8 (06-15 @ 20:30)  BMI (kg/m2): 19.8 (06-15 @ 20:30)  BSA (m2): 1.74 (06-15 @ 20:30)  GEN: NAD  HEENT: normocephalic and atraumatic. EOMI. PERRL.    NECK: Supple.  No lymphadenopathy   LUNGS: Clear to auscultation.  HEART: Irregular rate and rhythm   ABDOMEN: Soft, nontender, and nondistended.  Positive bowel sounds.    : No CVA tenderness  EXTREMITIES: R hallux with an ulcer, looks gangrenous with superimposed infection   has some bloody discharge. R heel ulcer very superficial,   left medial malleolus small ulcer minimal serous discharge   NEUROLOGIC: grossly intact.  PSYCHIATRIC: Appropriate affect .  SKIN: No rash     Labs:  06-15    136  |  99  |  48<H>  ----------------------------<  134<H>  3.9   |  25  |  4.80<H>    Ca    9.1      15 Justin 2022 21:13    TPro  7.0  /  Alb  2.9<L>  /  TBili  0.6  /  DBili  x   /  AST  41<H>  /  ALT  39  /  AlkPhos  128<H>  06-15                          13.2   16.82 )-----------( 260      ( 15 Justin 2022 21:13 )             43.2     PT/INR - ( 15 Justin 2022 22:45 )   PT: 13.4 sec;   INR: 1.14 ratio    PTT - ( 15 Justin 2022 22:45 )  PTT:27.3 sec  Urinalysis Basic - ( 15 Justin 2022 23:20 )    Color: Yellow / Appearance: Clear / S.010 / pH: x  Gluc: x / Ketone: Small  / Bili: Negative / Urobili: 1   Blood: x / Protein: Negative / Nitrite: Negative   Leuk Esterase: Negative / RBC: x / WBC x   Sq Epi: x / Non Sq Epi: x / Bacteria: x    LIVER FUNCTIONS - ( 15 Justin 2022 21:13 )  Alb: 2.9 g/dL / Pro: 7.0 g/dL / ALK PHOS: 128 U/L / ALT: 39 U/L / AST: 41 U/L / GGT: x           CARDIAC MARKERS ( 15 Justin 2022 21:13 )  x     / x     / 54 U/L / x     / x        SARS-CoV-2: NotDetec (06-15-22 @ 22:44)    RECENT CULTURES:  06-15 @ 22:44      NotDetec    All imaging and other data have been reviewed.  < from: CT Chest No Cont (22 @ 08:16) >  IMPRESSION:  Small layering bilateral pleural effusions decreased from 2022.  7 mm groundglass nodule within left lower lobe (series 2 image 44).   Recommend follow-up chest CT in 12 months to determine stability.    Assessment and Plan:   70 yo woman with PMH of HTN, DM2, CAD, CHF, A.fib, multiple falls and ESRD on HD was admitted on , came to ED with generalized weakness.   Her hypoglycemia and hypothermia on admission could be related to sepsis/SIRS source unclear at this time, could be foot infection? Left hallux looks like  a dry gangrene with superimposed infection.   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 She had Tissue cultures from 3/30/22 grew   klebsiella oxytoca/raoutella oxytoca, E. coli, MSSA so Cefazolin was given after HD to complete the course of treatment.     Recommendations:  - Blood culture x 2   - UA and UC not very valuable due to ESRD  - ESR and CRP   - Podiatry and vascular surgery consults for foot infection  - Start Ceftriaxone 1gm daily and Vancomycin 1gm stat until we have more information .    Thank you for courtesy of this consult.     Will follow.  Discussed with the primary team.     Geovany Long MD  Division of Infectious Diseases   Please call ID service at [phone] with any question.      55 minutes spent on total encounter assessing patient, examination, chart reivew, counseling and coordinating care by the attending physician/nurse/care manager.

## 2022-06-16 NOTE — H&P ADULT - PROBLEM SELECTOR PLAN 9
- Chronic stable  - VSS  - Continue Amlodipine 5 mg with hold parameters  - Dash/Renal Diet  - continue to monitor routine hemodynamics

## 2022-06-16 NOTE — CONSULT NOTE ADULT - ASSESSMENT
patient presents with   1. Right hallux wet gangrenous necrotic toe, non-dopplerable pulses, no odor,   recommendation:   -vascular consult  -betadine soaks daily  2. Right 2nd toe stable eschar: foot is cyanotic  recommendation:   -vascular consult  -betadine soaks daily  3. Left medial malleolus patient states was to the bone and is resolving area is macerated cyanotic, dopplerable pulses  recommendation:   -paint with cavilon, adaptic, 4x4, marie to secure  At risk for altered tissue perfusion /YES  Impaired perfusion of peripheral tissue /YES  Continue  Nutrition (as tolerated)  Continue  Offloading   Continue Pericare  Apply cair boots at all times while in bed.   Provide skin checks and foot placement q8h.  Care as per medicine will follow w/ you  [facility]   Findings and recommendations discussed with LEV Barker  Thank you for this consult  Sayra Christensen NP, [facility] [phone]

## 2022-06-16 NOTE — CONSULT NOTE ADULT - SUBJECTIVE AND OBJECTIVE BOX
Patient is a 71y old  Female who presents with a chief complaint of SIRS (16 Jun 2022 09:31)      Reason For Consult: hypoglycemia    HPI:  Patient is a 71 year old female with PMH of A.fib rate controlled not on AC for hx of multiple falls, T2DM, HTN, HLD, ESRD on HD, CHF, s/p CABG brought in by EMS for generalized weakness at home. Patient states that she was doing well when in the afternoon she tried to get up from her couch and felt weak in the LE and fell back in her couch. Denies any hx of head trauma or loss of consciousness. Denies any weakness or numbness. Denies any chest pain, SOB or palpitations. No fever, cough or chills. No hx of recent travel or sick contacts. At the time of EMS arrival patient was found to have POCBS of 50. EMS gave dextrose and on arrival to the ED patient blood sugar was found to be in 30's with hypothermia of 94.9.    ED course:   Vitals:   BP: 176/85  HR:76  Temp:94.2  RR:18, O2:96% on RA   Significant Labs:   CBC: WBC:16.82 Hb:13.2 , Platlets: 260, Neutro:89   CMP: Lytes: WNL, BUN/Creatinine:48/4.8, Glucose:134 , Alkaline Phos:128, AST, 41, eGFR: 9, HsTrop: 275.9, ProBNP: 197018, Lactate: 2  UA; negative  CXR: Heart and lung appear normal (self read)  CT BRAIN: No acute intracranial bleeding. Central volume loss, chronic microvascular ischemic changes, and chronic bilateral lacunar infarctions.  CT CERVICAL SPINE: No fracture. Grade 1 C4-C5 anterolisthesis. C6-C7 disc degeneration. Bilateral pleural effusions and interlobular septal thickening due to   interstitial edema.  EKG: NSR, left axis deviation, HR 71,   QT/QTc: 426/462  Patient received: Ceftriaxone 1 g x1, Dextrose 5% + NS 1L x1, Dextrose 50% IV X1, heating blanket, 2L NC   (16 Jun 2022 01:19)      PAST MEDICAL & SURGICAL HISTORY:  Diabetes mellitus II      HTN (hypertension)      h/o Anxiety attack      Depression      h/o Myocardial infarct 2007      CAD (coronary artery disease)      h/o Hepatitis A 1969  currently resolved, no symptoms      PAD (peripheral artery disease)      Murmur, cardiac      h/o Smoking  quitted 3/2012      CRF (chronic renal failure), unspecified stage      Dialysis patient      Anemia secondary to renal failure      HTN (hypertension)      coronary stent 2007      s/p Ovarian cyst removal      s/p surgical removal of benign Skin lesion epigastric area          FAMILY HISTORY:  No pertinent family history in first degree relatives          Social History:    MEDICATIONS  (STANDING):  amLODIPine   Tablet 5 milliGRAM(s) Oral daily  aspirin enteric coated 81 milliGRAM(s) Oral daily  atorvastatin 40 milliGRAM(s) Oral at bedtime  calcium acetate 667 milliGRAM(s) Oral three times a day with meals  cloNIDine 0.1 milliGRAM(s) Oral two times a day  clopidogrel Tablet 75 milliGRAM(s) Oral daily  dextrose 5%. 1000 milliLiter(s) (100 mL/Hr) IV Continuous <Continuous>  dextrose 5%. 1000 milliLiter(s) (50 mL/Hr) IV Continuous <Continuous>  dextrose 50% Injectable 25 Gram(s) IV Push once  dextrose 50% Injectable 12.5 Gram(s) IV Push once  dextrose 50% Injectable 25 Gram(s) IV Push once  diltiazem    Tablet 60 milliGRAM(s) Oral every 8 hours  glucagon  Injectable 1 milliGRAM(s) IntraMuscular once  heparin   Injectable 5000 Unit(s) SubCutaneous every 12 hours  imipramine 50 milliGRAM(s) Oral daily  insulin glargine Injectable (LANTUS) 20 Unit(s) SubCutaneous at bedtime  insulin lispro (ADMELOG) corrective regimen sliding scale   SubCutaneous three times a day before meals  insulin lispro (ADMELOG) corrective regimen sliding scale   SubCutaneous at bedtime  metoprolol tartrate 100 milliGRAM(s) Oral every 12 hours  pantoprazole    Tablet 40 milliGRAM(s) Oral before breakfast    MEDICATIONS  (PRN):  acetaminophen     Tablet .. 650 milliGRAM(s) Oral every 6 hours PRN Temp greater or equal to 38C (100.4F), Mild Pain (1 - 3)  aluminum hydroxide/magnesium hydroxide/simethicone Suspension 30 milliLiter(s) Oral every 4 hours PRN Dyspepsia  dextrose Oral Gel 15 Gram(s) Oral once PRN Blood Glucose LESS THAN 70 milliGRAM(s)/deciliter  melatonin 3 milliGRAM(s) Oral at bedtime PRN Insomnia  ondansetron Injectable 4 milliGRAM(s) IV Push every 8 hours PRN Nausea and/or Vomiting        T(C): 36.7 (06-16-22 @ 06:24), Max: 36.7 (06-16-22 @ 05:25)  HR: 77 (06-16-22 @ 06:24) (75 - 77)  BP: 149/73 (06-16-22 @ 06:24) (149/73 - 178/86)  RR: 18 (06-16-22 @ 06:24) (17 - 18)  SpO2: 92% (06-16-22 @ 06:24) (92% - 99%)  Wt(kg): --    PHYSICAL EXAM:  GENERAL: NAD, well-groomed, well-developed  HEAD:  Atraumatic, Normocephalic  NECK: Supple, No JVD, Normal thyroid  CHEST/LUNG: Clear to percussion bilaterally; No rales, rhonchi, wheezing, or rubs  HEART: Regular rate and rhythm; No murmurs, rubs, or gallops  ABDOMEN: Soft, Nontender, Nondistended; Bowel sounds present  EXTREMITIES:  2+ Peripheral Pulses, No clubbing, cyanosis, or edema  SKIN: No rashes or lesions    CAPILLARY BLOOD GLUCOSE      POCT Blood Glucose.: 98 mg/dL (16 Jun 2022 07:42)  POCT Blood Glucose.: 185 mg/dL (16 Jun 2022 01:54)  POCT Blood Glucose.: 172 mg/dL (15 Justin 2022 22:33)  POCT Blood Glucose.: 146 mg/dL (15 Justin 2022 21:39)  POCT Blood Glucose.: 101 mg/dL (15 Justin 2022 21:09)  POCT Blood Glucose.: 32 mg/dL (15 Justin 2022 20:41)                            13.2   16.82 )-----------( 260      ( 15 Justin 2022 21:13 )             43.2       CMP:  06-15 @ 21:13  SGPT 39  Albumin 2.9   Alk Phos 128   Anion Gap 12   SGOT 41   Total Bili 0.6   BUN 48   Calcium Total 9.1   CO2 25   Chloride 99   Creatinine 4.80   eGFR if AA --   eGFR if non AA --   Glucose 134   Potassium 3.9   Protein 7.0   Sodium 136      Thyroid Function Tests:      Diabetes Tests:       Radiology:

## 2022-06-16 NOTE — H&P ADULT - ASSESSMENT
Patient is a 71 year old female with PMH of A.fib rate controlled not on AC for hx of multiple falls, T2DM, HTN, HLD, ESRD on HD, CHF, s/p CABG brought in by EMS for generalized weakness at home and was found to be hypoglycemic and meets SIRS criteria

## 2022-06-16 NOTE — H&P ADULT - PROBLEM SELECTOR PLAN 4
- patient with hx of HFrEF  - last ECHO in 04/22 shows EF of 45%  - Admission labs show ProBNP of [id number]  - Likely elevated due to ESRD  - No sign of fluid overload   - Consult Dr. James, will appreciate recs   - Avoid excessive fluids - patient with hx of HFrEF  - last ECHO in 04/22 shows EF of 45%  - Admission labs show ProBNP of [id number]  - Likely elevated in the setting of ESRD  - Consult Dr. James, will appreciate recs   - Avoid excessive fluids

## 2022-06-16 NOTE — H&P ADULT - PROBLEM SELECTOR PLAN 6
- Patient with hx of ESRD  - On HD TTS schedule  - admission eGFR 9  - Follows Dr. Edwards as out patient, will consult Dr. Edwards, appreciate recs  - - Patient with hx of ESRD  - On HD TTS schedule  - admission eGFR 9  - Follows Dr. Edwards as out patient, will consult Dr. Edwards, appreciate recs

## 2022-06-16 NOTE — H&P ADULT - PROBLEM SELECTOR PLAN 2
- Patient presented with c/o generalized weakness and was found to have blood sugar in 30's  - S/p Dextrose 50% IV X1 in the ED and D5 NS 1L X1 with appropriate response  - Patient with hx of T2DM, on Lantus 40 units qhs not on any oral hypoglycemics  - Accu cheks ac/hs  - Continue to monitor for signs of hypoglycemia - Patient presented to the ED with the c/o Generalized weakness and possible fall and was found to have hypothermia 94.9 and leukocytosis of 16.82  - Patient meets SIRS criteria - No clear source of infection  - UA unremarkable, no abdominal pain, CXR with no infiltrates seen on wet read   - S/P ceftriaxone in the ED - will monitor off Abx  - Patient was found to be hypoglycemic on arrival to the ED with POCS in 30's  - Hypothermia and leukocytosis likely reactive in the setting of hypoglycemia  - S/p Dextrose 50% IV X1 in the ED and D5 NS 1L X1 with appropriate response  - will monitor off Abx pending ID recommendations   - CT BRAIN: No acute intracranial bleeding. Central volume loss, chronic microvascular ischemic changes, and chronic bilateral lacunar infarctions.  - CT CERVICAL SPINE: No fracture. Grade 1 C4-C5 anterolisthesis. C6-C7 disc degeneration. Bilateral pleural effusions and interlobular septal thickening due to interstitial edema.  - CXR: no clear evidence of PNA  - F/U CT chest   - Trend WBC and monitor for fever  - F/u UA, UCx, BCx x2  - Lactate 2 on admission, f/u repeat lactate   - ID Dr. Tsai consulted, will appreciate recs

## 2022-06-16 NOTE — H&P ADULT - PROBLEM SELECTOR PLAN 3
- No Hx of pulmonary disease  - Patient does not c/o cough, fever or chills  - CT Cervical shoes Bilateral pleural effusions and interlobular septal thickening due to interstitial edema.  - F/U with CT chest  - s/o ceftriaxone in the ED - will monitor off Abx  - ID Dr. Long consulted, will appreciate recs - No Hx of pulmonary disease  - Patient does not c/o cough, fever or chills  - CT Cervical shoes Bilateral pleural effusions and interlobular septal thickening due to interstitial edema.  - F/U with CT chest  - s/o ceftriaxone in the ED - will monitor off Abx  - ID Dr. Tsai, consulted, will appreciate recs - No Hx of pulmonary disease  - Patient does not c/o cough, fever or chills  - CT Cervical shoes Bilateral pleural effusions and interlobular septal thickening due to interstitial edema.  - F/U dedicated CT chest  - s/p ceftriaxone in the ED - will monitor off Abx pending  ct results  - ID Dr. Tsai, consulted, will appreciate recs

## 2022-06-16 NOTE — CONSULT NOTE ADULT - NS PANP COMMENT GEN_ALL_CORE FT
07/24/2018 1350 - Discharge Outreach - Readmitted - No call made.   
Patient evaluated at the bedside. R toe gangrene. CTA reviewed. R SFA segmental occl and fem fem anastomosis stenosis. No good endovascular option. Will perform R fem pop bypass. Patient aware and agrees.

## 2022-06-16 NOTE — CONSULT NOTE ADULT - SUBJECTIVE AND OBJECTIVE BOX
CARDIOLOGY CONSULT NOTE    Patient is a 71y Female with a known history of : admitted with weaknes and  hypoglycemia and elevated troponin  SIRS (systemic inflammatory response syndrome) [R65.10]    Hypoglycemia [E16.2]    Pleural effusion [J90]    CHF, acute [I50.9]    Chronic CHF [I50.9]    Elevated troponin [R77.8]    Atrial fibrillation [I48.91]    Benign essential HTN [I10]    HLD (hyperlipidemia) [E78.5]    Depression, major [F32.9]    Need for prophylactic measure [Z29.9]    ESRD on hemodialysis [N18.6]    T2DM (type 2 diabetes mellitus) [E11.9]    HFrEF (heart failure with reduced ejection fraction) [I50.20]      HPI:  Patient is a 71 year old female with PMH of A.fib rate controlled not on AC for hx of multiple falls, T2DM, HTN, HLD, ESRD on HD, CHF, s/p CABG brought in by EMS for generalized weakness at home. Patient states that she was doing well when in the afternoon she tried to get up from her couch and felt weak in the LE and fell back in her couch. Denies any hx of head trauma or loss of consciousness. Denies any weakness or numbness. Denies any chest pain, SOB or palpitations. No fever, cough or chills. No hx of recent travel or sick contacts. At the time of EMS arrival patient was found to have POCBS of 50. EMS gave dextrose and on arrival to the ED patient blood sugar was found to be in 30's with hypothermia of 94.9.    ED course:   Vitals:   BP: 176/85  HR:76  Temp:94.2  RR:18, O2:96% on RA   Significant Labs:   CBC: WBC:16.82 Hb:13.2 , Platlets: 260, Neutro:89   CMP: Lytes: WNL, BUN/Creatinine:48/4.8, Glucose:134 , Alkaline Phos:128, AST, 41, eGFR: 9, HsTrop: 275.9, ProBNP: [id number], Lactate: 2  UA; negative  CXR: Heart and lung appear normal (self read)  CT BRAIN: No acute intracranial bleeding. Central volume loss, chronic microvascular ischemic changes, and chronic bilateral lacunar infarctions.  CT CERVICAL SPINE: No fracture. Grade 1 C4-C5 anterolisthesis. C6-C7 disc degeneration. Bilateral pleural effusions and interlobular septal thickening due to   interstitial edema.  EKG: NSR, left axis deviation, HR 71,   QT/QTc: 426/462  Patient received: Ceftriaxone 1 g x1, Dextrose 5% + NS 1L x1, Dextrose 50% IV X1, heating blanket, 2L NC   (16 Jun 2022 01:19)      REVIEW OF SYSTEMS:    CONSTITUTIONAL: No fever, weight loss, or fatigue  EYES: No eye pain, visual disturbances, or discharge  ENMT:  No difficulty hearing, tinnitus, vertigo; No sinus or throat pain  NECK: No pain or stiffness  BREASTS: No pain, masses, or nipple discharge  RESPIRATORY: No cough, wheezing, chills or hemoptysis; No shortness of breath  CARDIOVASCULAR: No chest pain, palpitations, dizziness, or leg swelling  GASTROINTESTINAL: No abdominal or epigastric pain. No nausea, vomiting, or hematemesis; No diarrhea or constipation. No melena or hematochezia.  GENITOURINARY: No dysuria, frequency, hematuria, or incontinence  NEUROLOGICAL: No headaches, memory loss, loss of strength, numbness, or tremors  SKIN: No itching, burning, rashes, or lesions   LYMPH NODES: No enlarged glands  ENDOCRINE: No heat or cold intolerance; No hair loss  MUSCULOSKELETAL: No joint pain or swelling; No muscle, back, or extremity pain  PSYCHIATRIC: No depression, anxiety, mood swings, or difficulty sleeping  HEME/LYMPH: No easy bruising, or bleeding gums  ALLERGY AND IMMUNOLOGIC: No hives or eczema    MEDICATIONS  (STANDING):  amLODIPine   Tablet 5 milliGRAM(s) Oral daily  aspirin enteric coated 81 milliGRAM(s) Oral daily  atorvastatin 40 milliGRAM(s) Oral at bedtime  calcium acetate 667 milliGRAM(s) Oral three times a day with meals  cefTRIAXone   IVPB 1000 milliGRAM(s) IV Intermittent every 24 hours  cloNIDine 0.1 milliGRAM(s) Oral two times a day  clopidogrel Tablet 75 milliGRAM(s) Oral daily  dextrose 5%. 1000 milliLiter(s) (100 mL/Hr) IV Continuous <Continuous>  dextrose 5%. 1000 milliLiter(s) (50 mL/Hr) IV Continuous <Continuous>  dextrose 50% Injectable 25 Gram(s) IV Push once  dextrose 50% Injectable 12.5 Gram(s) IV Push once  dextrose 50% Injectable 25 Gram(s) IV Push once  diltiazem    Tablet 60 milliGRAM(s) Oral every 8 hours  glucagon  Injectable 1 milliGRAM(s) IntraMuscular once  heparin   Injectable 5000 Unit(s) SubCutaneous every 12 hours  imipramine 50 milliGRAM(s) Oral daily  insulin glargine Injectable (LANTUS) 12 Unit(s) SubCutaneous at bedtime  insulin lispro (ADMELOG) corrective regimen sliding scale   SubCutaneous three times a day before meals  insulin lispro (ADMELOG) corrective regimen sliding scale   SubCutaneous at bedtime  metoprolol tartrate 100 milliGRAM(s) Oral every 12 hours  pantoprazole    Tablet 40 milliGRAM(s) Oral before breakfast  povidone iodine 10% Solution 1 Application(s) Topical daily  vancomycin  IVPB 1000 milliGRAM(s) IV Intermittent once    MEDICATIONS  (PRN):  acetaminophen     Tablet .. 650 milliGRAM(s) Oral every 6 hours PRN Temp greater or equal to 38C (100.4F), Mild Pain (1 - 3)  aluminum hydroxide/magnesium hydroxide/simethicone Suspension 30 milliLiter(s) Oral every 4 hours PRN Dyspepsia  dextrose Oral Gel 15 Gram(s) Oral once PRN Blood Glucose LESS THAN 70 milliGRAM(s)/deciliter  melatonin 3 milliGRAM(s) Oral at bedtime PRN Insomnia  ondansetron Injectable 4 milliGRAM(s) IV Push every 8 hours PRN Nausea and/or Vomiting      ALLERGIES: latex (Unknown)  No Known Drug Allergies      Social History:     FAMILY HISTORY:  No pertinent family history in first degree relatives        PAST MEDICAL & SURGICAL HISTORY:  Diabetes mellitus II      HTN (hypertension)      h/o Anxiety attack      Depression      h/o Myocardial infarct 2007      CAD (coronary artery disease)      h/o Hepatitis A 1969  currently resolved, no symptoms      PAD (peripheral artery disease)      Murmur, cardiac      h/o Smoking  quitted 3/2012      CRF (chronic renal failure), unspecified stage      Dialysis patient      Anemia secondary to renal failure      HTN (hypertension)      coronary stent 2007      s/p Ovarian cyst removal      s/p surgical removal of benign Skin lesion epigastric area            PHYSICAL EXAMINATION:  -----------------------------  T(C): 36.7 (06-16-22 @ 14:55), Max: 36.7 (06-16-22 @ 05:25)  HR: 77 (06-16-22 @ 14:55) (75 - 77)  BP: 136/76 (06-16-22 @ 14:55) (131/56 - 178/86)  RR: 18 (06-16-22 @ 14:55) (17 - 18)  SpO2: 95% (06-16-22 @ 14:55) (92% - 99%)  Wt(kg): --    Height (cm): 175.3 (06-15 @ 20:30)  Weight (kg): 60.8 (06-15 @ 20:30)  BMI (kg/m2): 19.8 (06-15 @ 20:30)  BSA (m2): 1.74 (06-15 @ 20:30)    Constitutional: well developed, normal appearance, well groomed, well nourished, no deformities and no acute distress.   Eyes: the conjunctiva exhibited no abnormalities and the eyelids demonstrated no xanthelasmas.   HEENT: normal oral mucosa, no oral pallor and no oral cyanosis.   Neck: normal jugular venous A waves present, normal jugular venous V waves present and no jugular venous wilson A waves.   Pulmonary: no respiratory distress, normal respiratory rhythm and effort, no accessory muscle use and lungs were clear to auscultation bilaterally.   Cardiovascular: heart rate and rhythm were normal, normal S1 and S2 and no murmur, gallop, rub, heave or thrill are present.   Abdomen: soft, non-tender, no hepato-splenomegaly and no abdominal mass palpated.   Musculoskeletal: the gait could not be assessed..   Extremities: no clubbing of the fingernails, no localized cyanosis, no petechial hemorrhages and no ischemic changes.   Skin: normal skin color and pigmentation, no rash, no venous stasis, no skin lesions, no skin ulcer and no xanthoma was observed.   Psychiatric: oriented to person, place, and time, the affect was normal, the mood was normal and not feeling anxious.     LABS:   --------  06-16    136  |  97  |  56<H>  ----------------------------<  244<H>  4.5   |  29  |  5.40<H>    Ca    8.9      16 Jun 2022 11:57    TPro  6.3  /  Alb  2.7<L>  /  TBili  0.5  /  DBili  x   /  AST  26  /  ALT  27  /  AlkPhos  116  06-16                         11.1   16.09 )-----------( 284      ( 16 Jun 2022 11:57 )             35.1     PT/INR - ( 15 Justin 2022 22:45 )   PT: 13.4 sec;   INR: 1.14 ratio         PTT - ( 15 Justin 2022 22:45 )  PTT:27.3 sec  06-15 @ 21:13 BNP: 141516 pg/mL            RADIOLOGY:  -----------------        ECG:     ECHO

## 2022-06-16 NOTE — H&P ADULT - ATTENDING COMMENTS
71 year old female with PMH of A.fib rate controlled not on AC for hx of multiple falls, T2DM, HTN, HLD, ESRD on HD, CHF, s/p CABG brought in by EMS for generalized weakness at home and was found to be hypoglycemic and meets SIRS criteria.    Assessment and plan as above. Edited personally where appropriate directly into the body of the note.

## 2022-06-16 NOTE — H&P ADULT - NSHPPHYSICALEXAM_GEN_ALL_CORE
Vital Signs Last 24 Hrs  T(C): 34.6 (15 Justin 2022 22:03), Max: 35 (15 Justin 2022 20:30)  T(F): 94.2 (15 Justin 2022 22:03), Max: 95 (15 Justin 2022 20:30)  HR: 76 (15 Justin 2022 22:03) (75 - 76)  BP: 176/85 (15 Justin 2022 22:03) (176/85 - 177/89)  RR: 18 (15 Justin 2022 22:03) (18 - 18)  SpO2: 96% (15 Justin 2022 22:03) (96% - 99%)    GENERAL: Well developed female who is laying in bed and is in NAD  HEAD:  Atraumatic, Normocephalic  EYES: EOMI, PERRLA, conjunctiva and sclera clear  ENT: Moist mucous membranes  NECK: Supple, No JVD  CHEST/LUNG: Decreased air entry bilaterally at bases; No rales, rhonchi, wheezing, or rubs. Unlabored respirations  HEART: Regular rate and rhythm; No murmurs, rubs, or gallops  ABDOMEN: Bowel sounds present; Soft, Nontender, Nondistended. No hepatomegaly  EXTREMITIES:  2+ Peripheral Pulses, brisk capillary refill. No clubbing, cyanosis, 1+ edema BL LE  NERVOUS SYSTEM:  Alert & Oriented X3, speech clear. No deficits   MSK: FROM all 4 extremities, full and equal strength  SKIN: Warm and dry

## 2022-06-16 NOTE — PROGRESS NOTE ADULT - ASSESSMENT
HPI:  Patient is a 71 year old female with PMH of A.fib rate controlled not on AC for hx of multiple falls, T2DM, HTN, HLD, ESRD on HD, CHF, s/p CABG brought in by EMS for generalized weakness at home. Patient states that she was doing well when in the afternoon she tried to get up from her couch and felt weak in the LE and fell back in her couch. Denies any hx of head trauma or loss of consciousness. Denies any weakness or numbness. Denies any chest pain, SOB or palpitations. No fever, cough or chills. No hx of recent travel or sick contacts. At the time of EMS arrival patient was found to have POCBS of 50. EMS gave dextrose and on arrival to the ED patient blood sugar was found to be in 30's with hypothermia of 94.9.    ED course:   Vitals:   BP: 176/85  HR:76  Temp:94.2  RR:18, O2:96% on RA   Significant Labs:   CBC: WBC:16.82 Hb:13.2 , Platlets: 260, Neutro:89   CMP: Lytes: WNL, BUN/Creatinine:48/4.8, Glucose:134 , Alkaline Phos:128, AST, 41, eGFR: 9, HsTrop: 275.9, ProBNP: [id number], Lactate: 2  UA; negative  CXR: Heart and lung appear normal (self read)  CT BRAIN: No acute intracranial bleeding. Central volume loss, chronic microvascular ischemic changes, and chronic bilateral lacunar infarctions.  CT CERVICAL SPINE: No fracture. Grade 1 C4-C5 anterolisthesis. C6-C7 disc degeneration. Bilateral pleural effusions and interlobular septal thickening due to   interstitial edema.  EKG: NSR, left axis deviation, HR 71,   QT/QTc: 426/462  Patient received: Ceftriaxone 1 g x1, Dextrose 5% + NS 1L x1, Dextrose 50% IV X1, heating blanket, 2L NC   (16 Jun 2022 01:19)      esrd on hd tues thurs sat     ANEMIA PLAN:  Anemia of chronic disease:  We will continue epo  aiming for a HCT of 32-36 %.   We will monitor Iron stores, B12 and RBC folate .    BP monitoring,continue current antihypertensive meds, low salt diet    f/u  blood and urine cx,serial lactate levels,monitor vitals closley,ivfs hydration,monitor urine output and renal profile,iv abx

## 2022-06-16 NOTE — H&P ADULT - HISTORY OF PRESENT ILLNESS
ED course:   Vitals:   BP: 176/85  HR:76  Temp:94.2  RR:18, O2:96% on RA   Significant Labs:   CBC: WBC: Hb: , Platlets: , Neutro: , lymp:   CMP:  NA-, K+-, Cl- , BUN: , Creatinine: , Glucose: , Alkaline Phos:   ABG: Ph: , Pco2:  Hco3:  Urine: Ketones: , Nitrates:, Leukocyte estrase: , WBC: , Bacteria: , Glucose: .   CXR:   CT   EKG:   Patient received:   Patient is a 71 year old female with PMH of A.fib rate controlled not on AC for hx of multiple falls, T2DM, HTN, HLD, ESRD on HD, CHF, s/p CABG brought in by EMS for generalized weakness at home. Patient states that she was doing well when in the afternoon she tried to get up from her couch and felt week in the LE and fell back in her couch. Denies any hx of head trauma or loss of consciousness. Denies any weakness or numbness. Denies any chest pain, SOB or palpitations. No fever, cough or chills. No hx of recent travel or sick contacts. At the time of EMS arrival patient was found to have POCBS of 50. EMS gave dextrose and on arrival to the ED patient blood sugar was found to be in 30's with hypothermia of 94.9.    ED course:   Vitals:   BP: 176/85  HR:76  Temp:94.2  RR:18, O2:96% on RA   Significant Labs:   CBC: WBC:16.82 Hb:13.2 , Platlets: 260, Neutro:89   CMP: Lytes: WNL, BUN/Creatinine:48/4.8, Glucose:134 , Alkaline Phos:128, AST, 41, eGFR: 9, HsTrop: 275.9, ProBNP: 061834, Lactate: 2  UA; negative  CXR: Heart and lung appear normal (self read)  CT BRAIN: No acute intracranial bleeding. Central volume loss, chronic microvascular ischemic changes, and chronic bilateral lacunar infarctions.  CT CERVICAL SPINE: No fracture. Grade 1 C4-C5 anterolisthesis. C6-C7 disc degeneration. Bilateral pleural effusions and interlobular septal thickening due to   interstitial edema.  EKG: NSR, left axis deviation, HR 71,   QT/QTc: 426/462  Patient received: Ceftriaxone 1 g x1, Dextrose 5% + NS 1L x1, Dextrose 50% IV X1, heating blanket, 2L NC   Patient is a 71 year old female with PMH of A.fib rate controlled not on AC for hx of multiple falls, T2DM, HTN, HLD, ESRD on HD, CHF, s/p CABG brought in by EMS for generalized weakness at home. Patient states that she was doing well when in the afternoon she tried to get up from her couch and felt weak in the LE and fell back in her couch. Denies any hx of head trauma or loss of consciousness. Denies any weakness or numbness. Denies any chest pain, SOB or palpitations. No fever, cough or chills. No hx of recent travel or sick contacts. At the time of EMS arrival patient was found to have POCBS of 50. EMS gave dextrose and on arrival to the ED patient blood sugar was found to be in 30's with hypothermia of 94.9.    ED course:   Vitals:   BP: 176/85  HR:76  Temp:94.2  RR:18, O2:96% on RA   Significant Labs:   CBC: WBC:16.82 Hb:13.2 , Platlets: 260, Neutro:89   CMP: Lytes: WNL, BUN/Creatinine:48/4.8, Glucose:134 , Alkaline Phos:128, AST, 41, eGFR: 9, HsTrop: 275.9, ProBNP: 756078, Lactate: 2  UA; negative  CXR: Heart and lung appear normal (self read)  CT BRAIN: No acute intracranial bleeding. Central volume loss, chronic microvascular ischemic changes, and chronic bilateral lacunar infarctions.  CT CERVICAL SPINE: No fracture. Grade 1 C4-C5 anterolisthesis. C6-C7 disc degeneration. Bilateral pleural effusions and interlobular septal thickening due to   interstitial edema.  EKG: NSR, left axis deviation, HR 71,   QT/QTc: 426/462  Patient received: Ceftriaxone 1 g x1, Dextrose 5% + NS 1L x1, Dextrose 50% IV X1, heating blanket, 2L NC

## 2022-06-16 NOTE — CONSULT NOTE ADULT - PROBLEM SELECTOR RECOMMENDATION 9
decrease lantus 10 units qhs  cont low dose admelog corrective scale coverage qac/qhs  cont cons cho diet  goal bg conservative mngmt

## 2022-06-16 NOTE — H&P ADULT - PROBLEM SELECTOR PLAN 1
- Patient presented to the ED with the c/o Generalized weakness and possible fall and was found to have hypothermia 94.9 and leukocytosis of 16.82  - Patient meets SIRS criteria - No clear source of infection  - UA unremarkable, no abdominal pain  - S/P ceftriaxone in the ED - will monitor off Abx  - Patient was found to be hypoglycemic on arrival to the ED with POCS in 30's  - S/p Dextrose 50% IV X1 in the ED and D5 NS 1L X1 with appropriate response  - Hypothermia and leukocytosis likely reactive in the setting of hypoglycemia  - will monitor off Abx and will get ID consult for further recommendations.   - CT BRAIN: No acute intracranial bleeding. Central volume loss, chronic microvascular ischemic changes, and chronic bilateral lacunar infarctions.  - CT CERVICAL SPINE: No fracture. Grade 1 C4-C5 anterolisthesis. C6-C7 disc degeneration. Bilateral pleural effusions and interlobular septal thickening due to interstitial edema.  - CXR: no clear evidence of PNA  - F/U CT chest   - Trend WBC and monitor for fever  - F/u UA, UCx, BCx x2  - Lactate 2 on admission, f/u repeat lactate (6 hours later)  - ID Dr. Miller consulted, will appreciate recs - Patient presented to the ED with the c/o Generalized weakness and possible fall and was found to have hypothermia 94.9 and leukocytosis of 16.82  - Patient meets SIRS criteria - No clear source of infection  - UA unremarkable, no abdominal pain  - S/P ceftriaxone in the ED - will monitor off Abx  - Patient was found to be hypoglycemic on arrival to the ED with POCS in 30's  - S/p Dextrose 50% IV X1 in the ED and D5 NS 1L X1 with appropriate response  - Hypothermia and leukocytosis likely reactive in the setting of hypoglycemia  - will monitor off Abx and will get ID consult for further recommendations.   - CT BRAIN: No acute intracranial bleeding. Central volume loss, chronic microvascular ischemic changes, and chronic bilateral lacunar infarctions.  - CT CERVICAL SPINE: No fracture. Grade 1 C4-C5 anterolisthesis. C6-C7 disc degeneration. Bilateral pleural effusions and interlobular septal thickening due to interstitial edema.  - CXR: no clear evidence of PNA  - F/U CT chest   - Trend WBC and monitor for fever  - F/u UA, UCx, BCx x2  - Lactate 2 on admission, f/u repeat lactate   - ID Dr. Tsai consulted, will appreciate recs - Patient presented with c/o generalized weakness and fall and was found to have blood sugar in 30's  - S/p Dextrose 50% IV X1 in the ED and D5 NS 1L X1 with appropriate response  - Patient with hx of T2DM, on Lantus 40 units qhs not on any oral hypoglycemics per outpatient pharmacy   - Guicho paz ac/hs  - endocrine consulted

## 2022-06-16 NOTE — CONSULT NOTE ADULT - ASSESSMENT
72 yo woman with PMH of HTN, DM2, CAD, CHF, PAD, A.fib, multiple falls and ESRD on HD was admitted on 6/16, came to ED with generalized weakness.   Her hypoglycemia and hypothermia on admission could be related to sepsis/SIRS source likely sec to foot infection Right hallux with a dry gangrene and superimposed infection.     Cont abx as per ID  Will need diagnostic angio and likely RLE bypass based on previous CTA findings  Medical and cardiac optimization and risk assessment prior to procedure  Podiatry consult for possible intervention; may proceed from a vascular perspective for source control and vasc procedure can follow for healing  Discussed with Dr Miller

## 2022-06-16 NOTE — CONSULT NOTE ADULT - SUBJECTIVE AND OBJECTIVE BOX
HPI    HPI:  Patient is a 71 year old female with PMH of A.fib rate controlled not on AC for hx of multiple falls, T2DM, HTN, HLD, ESRD on HD, CHF, s/p CABG brought in by EMS for generalized weakness at home. Patient states that she was doing well when in the afternoon she tried to get up from her couch and felt weak in the LE and fell back in her couch. Denies any hx of head trauma or loss of consciousness. Denies any weakness or numbness. Denies any chest pain, SOB or palpitations. No fever, cough or chills. No hx of recent travel or sick contacts. At the time of EMS arrival patient was found to have POCBS of 50. EMS gave dextrose and on arrival to the ED patient blood sugar was found to be in 30's with hypothermia of 94.9.      PAST MEDICAL & SURGICAL HISTORY:  Diabetes mellitus II      HTN (hypertension)      h/o Anxiety attack      Depression      h/o Myocardial infarct 2007      CAD (coronary artery disease)      h/o Hepatitis A 1969  currently resolved, no symptoms      PAD (peripheral artery disease)      Murmur, cardiac      h/o Smoking  quitted 3/2012      CRF (chronic renal failure), unspecified stage      Dialysis patient      Anemia secondary to renal failure      HTN (hypertension)      coronary stent 2007      s/p Ovarian cyst removal      s/p surgical removal of benign Skin lesion epigastric area      REVIEW OF SYSTEMS  CONSTITUTIONAL: +weakness, no fevers or chills  HEENT: denies blurred visions, sore throat  SKIN: denies new lesions, rash  CARDIOVASCULAR: no palpitations  RESPIRATORY: denies shortness of breath, sputum production  GASTROINTESTINAL: denies nausea, vomiting, diarrhea, abdominal pain  all other ROS is negative unless indicated above        MEDICATIONS  (STANDING):  amLODIPine   Tablet 5 milliGRAM(s) Oral daily  aspirin enteric coated 81 milliGRAM(s) Oral daily  atorvastatin 40 milliGRAM(s) Oral at bedtime  calcium acetate 667 milliGRAM(s) Oral three times a day with meals  cefTRIAXone   IVPB 1000 milliGRAM(s) IV Intermittent every 24 hours  cloNIDine 0.1 milliGRAM(s) Oral two times a day  clopidogrel Tablet 75 milliGRAM(s) Oral daily  dextrose 5%. 1000 milliLiter(s) (100 mL/Hr) IV Continuous <Continuous>  dextrose 5%. 1000 milliLiter(s) (50 mL/Hr) IV Continuous <Continuous>  dextrose 50% Injectable 25 Gram(s) IV Push once  dextrose 50% Injectable 12.5 Gram(s) IV Push once  dextrose 50% Injectable 25 Gram(s) IV Push once  diltiazem    Tablet 60 milliGRAM(s) Oral every 8 hours  glucagon  Injectable 1 milliGRAM(s) IntraMuscular once  heparin   Injectable 5000 Unit(s) SubCutaneous every 12 hours  imipramine 50 milliGRAM(s) Oral daily  insulin glargine Injectable (LANTUS) 12 Unit(s) SubCutaneous at bedtime  insulin lispro (ADMELOG) corrective regimen sliding scale   SubCutaneous three times a day before meals  insulin lispro (ADMELOG) corrective regimen sliding scale   SubCutaneous at bedtime  metoprolol tartrate 100 milliGRAM(s) Oral every 12 hours  pantoprazole    Tablet 40 milliGRAM(s) Oral before breakfast  vancomycin  IVPB 1000 milliGRAM(s) IV Intermittent once    MEDICATIONS  (PRN):  acetaminophen     Tablet .. 650 milliGRAM(s) Oral every 6 hours PRN Temp greater or equal to 38C (100.4F), Mild Pain (1 - 3)  aluminum hydroxide/magnesium hydroxide/simethicone Suspension 30 milliLiter(s) Oral every 4 hours PRN Dyspepsia  dextrose Oral Gel 15 Gram(s) Oral once PRN Blood Glucose LESS THAN 70 milliGRAM(s)/deciliter  melatonin 3 milliGRAM(s) Oral at bedtime PRN Insomnia  ondansetron Injectable 4 milliGRAM(s) IV Push every 8 hours PRN Nausea and/or Vomiting      Allergies    latex (Unknown)  No Known Drug Allergies    Intolerances        SOCIAL HISTORY:      FAMILY HISTORY:  No pertinent family history in first degree relatives        Vital Signs Last 24 Hrs  T(C): 36.7 (16 Jun 2022 12:21), Max: 36.7 (16 Jun 2022 05:25)  T(F): 98 (16 Jun 2022 12:21), Max: 98.1 (16 Jun 2022 05:25)  HR: 76 (16 Jun 2022 12:21) (75 - 77)  BP: 131/56 (16 Jun 2022 12:21) (131/56 - 178/86)  BP(mean): --  RR: 18 (16 Jun 2022 12:21) (17 - 18)  SpO2: 93% (16 Jun 2022 12:21) (92% - 99%)                                11.1   16.09 )-----------( 284      ( 16 Jun 2022 11:57 )             35.1     06-16    136  |  97  |  56<H>  ----------------------------<  244<H>  4.5   |  29  |  5.40<H>    Ca    8.9      16 Jun 2022 11:57    TPro  6.3  /  Alb  2.7<L>  /  TBili  0.5  /  DBili  x   /  AST  26  /  ALT  27  /  AlkPhos  116  06-16    Auto Neutrophil #: 13.54 K/uL (06-16-22 @ 11:57)  Auto Neutrophil #: 15.14 K/uL (06-15-22 @ 21:13)    A1C with Estimated Average Glucose Result: 8.1 % (04-23-22 @ 13:17)  A1C with Estimated Average Glucose Result: 8.3 % (04-06-22 @ 09:45)      PHYSICAL EXAM:    General: resting comfortably in chair; NAD  ENT: no nasal discharge; hearing intact  Extremities: +cyanosis; + Lower ext edema : no gross deformities, able to move all four extremities, dopplerable DPTP left right not dopplerable right pain daily not constant 8/10 wet gangrene right hallux dry necrotic ulcerations on dorsum of the R 2nd toe and Dermatologic: BLLE skin cool,    Neurologic: awake, alert,  Can follow commands, weakened strength & sensation grossly intact

## 2022-06-16 NOTE — CONSULT NOTE ADULT - ASSESSMENT
pt with hypoglycemia  elevated troponin due to renal failure  ashd , s/p mi  s/p coronary stent  s/p cabg  esrd - on hd  dm2  hypertension  paf -not on oac - fall risk  pvd - s/p stent  dyslipidemia

## 2022-06-16 NOTE — H&P ADULT - NSHPSOCIALHISTORY_GEN_ALL_CORE
Tobacco: No  EtOH: No  recreational drug use: No  Lives with:   Ambulates: With walker  ADLs: needs assistance   Occupation: retired   Vaccinations: COVID Pfizer x2

## 2022-06-16 NOTE — H&P ADULT - PROBLEM SELECTOR PLAN 5
- Admission labs show elevated Troponin of 275.9  - Patient denies any chest pain, sob or palpitations  - EKG shows NSR with left axis deviation with non specific ST and T waves changes  - elevated troponin likely due to ESRD  - No need to trend trops - Admission labs show elevated Troponin of 275.9  - Patient denies any chest pain, sob or palpitations  - EKG shows NSR with left axis deviation with non specific ST and T waves changes  - elevated troponin likely due to ESRD  - trend x3 or to peak

## 2022-06-16 NOTE — H&P ADULT - PROBLEM SELECTOR PLAN 7
- Chronic stable  - On Lantus 40 qhs  - will decrease the dose given hypoglycemic episode   - Will start on lantus 20 units qhs with LDSS  - Accu checks AC/HS  - F/U A1C  - Adjust insulin requirement based on blood sugar levels - Chronic stable  - On Lantus 40 qhs  - Will start on lantus 20 units qhs with LDSS given hypoglycemic episode  - Accu checks AC/HS  - F/U A1C  - Adjust insulin requirement based on blood sugar levels  - per outpatient pharmacy patient recently rx ademolog however has not yet picked up and pharmacy unsure of dose   - endocrinology consulted, Dr. Perlman

## 2022-06-17 DIAGNOSIS — I96 GANGRENE, NOT ELSEWHERE CLASSIFIED: ICD-10-CM

## 2022-06-17 LAB
A1C WITH ESTIMATED AVERAGE GLUCOSE RESULT: 9 % — HIGH (ref 4–5.6)
ALBUMIN SERPL ELPH-MCNC: 2.8 G/DL — LOW (ref 3.3–5)
ALP SERPL-CCNC: 113 U/L — SIGNIFICANT CHANGE UP (ref 40–120)
ALT FLD-CCNC: 26 U/L — SIGNIFICANT CHANGE UP (ref 12–78)
ANION GAP SERPL CALC-SCNC: 12 MMOL/L — SIGNIFICANT CHANGE UP (ref 5–17)
AST SERPL-CCNC: 31 U/L — SIGNIFICANT CHANGE UP (ref 15–37)
BASOPHILS # BLD AUTO: 0.02 K/UL — SIGNIFICANT CHANGE UP (ref 0–0.2)
BASOPHILS NFR BLD AUTO: 0.2 % — SIGNIFICANT CHANGE UP (ref 0–2)
BILIRUB SERPL-MCNC: 0.5 MG/DL — SIGNIFICANT CHANGE UP (ref 0.2–1.2)
BUN SERPL-MCNC: 26 MG/DL — HIGH (ref 7–23)
CALCIUM SERPL-MCNC: 8.7 MG/DL — SIGNIFICANT CHANGE UP (ref 8.5–10.1)
CHLORIDE SERPL-SCNC: 97 MMOL/L — SIGNIFICANT CHANGE UP (ref 96–108)
CO2 SERPL-SCNC: 26 MMOL/L — SIGNIFICANT CHANGE UP (ref 22–31)
CREAT SERPL-MCNC: 3.6 MG/DL — HIGH (ref 0.5–1.3)
CRP SERPL-MCNC: 19 MG/L — HIGH
CULTURE RESULTS: SIGNIFICANT CHANGE UP
EGFR: 13 ML/MIN/1.73M2 — LOW
EOSINOPHIL # BLD AUTO: 0.18 K/UL — SIGNIFICANT CHANGE UP (ref 0–0.5)
EOSINOPHIL NFR BLD AUTO: 1.4 % — SIGNIFICANT CHANGE UP (ref 0–6)
ERYTHROCYTE [SEDIMENTATION RATE] IN BLOOD: 13 MM/HR — SIGNIFICANT CHANGE UP (ref 0–20)
ESTIMATED AVERAGE GLUCOSE: 212 MG/DL — HIGH (ref 68–114)
GLUCOSE SERPL-MCNC: 168 MG/DL — HIGH (ref 70–99)
HCT VFR BLD CALC: 37.3 % — SIGNIFICANT CHANGE UP (ref 34.5–45)
HGB BLD-MCNC: 11.6 G/DL — SIGNIFICANT CHANGE UP (ref 11.5–15.5)
IMM GRANULOCYTES NFR BLD AUTO: 1.4 % — SIGNIFICANT CHANGE UP (ref 0–1.5)
LYMPHOCYTES # BLD AUTO: 1.11 K/UL — SIGNIFICANT CHANGE UP (ref 1–3.3)
LYMPHOCYTES # BLD AUTO: 8.7 % — LOW (ref 13–44)
MCHC RBC-ENTMCNC: 28.9 PG — SIGNIFICANT CHANGE UP (ref 27–34)
MCHC RBC-ENTMCNC: 31.1 GM/DL — LOW (ref 32–36)
MCV RBC AUTO: 93 FL — SIGNIFICANT CHANGE UP (ref 80–100)
MONOCYTES # BLD AUTO: 1.61 K/UL — HIGH (ref 0–0.9)
MONOCYTES NFR BLD AUTO: 12.6 % — SIGNIFICANT CHANGE UP (ref 2–14)
NEUTROPHILS # BLD AUTO: 9.69 K/UL — HIGH (ref 1.8–7.4)
NEUTROPHILS NFR BLD AUTO: 75.7 % — SIGNIFICANT CHANGE UP (ref 43–77)
NRBC # BLD: 0 /100 WBCS — SIGNIFICANT CHANGE UP (ref 0–0)
PLATELET # BLD AUTO: 244 K/UL — SIGNIFICANT CHANGE UP (ref 150–400)
POTASSIUM SERPL-MCNC: 4.2 MMOL/L — SIGNIFICANT CHANGE UP (ref 3.5–5.3)
POTASSIUM SERPL-SCNC: 4.2 MMOL/L — SIGNIFICANT CHANGE UP (ref 3.5–5.3)
PROT SERPL-MCNC: 6.6 G/DL — SIGNIFICANT CHANGE UP (ref 6–8.3)
RBC # BLD: 4.01 M/UL — SIGNIFICANT CHANGE UP (ref 3.8–5.2)
RBC # FLD: 17.8 % — HIGH (ref 10.3–14.5)
SODIUM SERPL-SCNC: 135 MMOL/L — SIGNIFICANT CHANGE UP (ref 135–145)
SPECIMEN SOURCE: SIGNIFICANT CHANGE UP
WBC # BLD: 12.79 K/UL — HIGH (ref 3.8–10.5)
WBC # FLD AUTO: 12.79 K/UL — HIGH (ref 3.8–10.5)

## 2022-06-17 PROCEDURE — 99233 SBSQ HOSP IP/OBS HIGH 50: CPT

## 2022-06-17 PROCEDURE — 93922 UPR/L XTREMITY ART 2 LEVELS: CPT | Mod: 26

## 2022-06-17 PROCEDURE — 99232 SBSQ HOSP IP/OBS MODERATE 35: CPT

## 2022-06-17 PROCEDURE — 99221 1ST HOSP IP/OBS SF/LOW 40: CPT

## 2022-06-17 RX ADMIN — PANTOPRAZOLE SODIUM 40 MILLIGRAM(S): 20 TABLET, DELAYED RELEASE ORAL at 05:06

## 2022-06-17 RX ADMIN — Medication 667 MILLIGRAM(S): at 17:45

## 2022-06-17 RX ADMIN — Medication 100 MILLIGRAM(S): at 05:06

## 2022-06-17 RX ADMIN — HEPARIN SODIUM 5000 UNIT(S): 5000 INJECTION INTRAVENOUS; SUBCUTANEOUS at 05:05

## 2022-06-17 RX ADMIN — Medication 60 MILLIGRAM(S): at 21:23

## 2022-06-17 RX ADMIN — ATORVASTATIN CALCIUM 40 MILLIGRAM(S): 80 TABLET, FILM COATED ORAL at 21:23

## 2022-06-17 RX ADMIN — CLOPIDOGREL BISULFATE 75 MILLIGRAM(S): 75 TABLET, FILM COATED ORAL at 12:17

## 2022-06-17 RX ADMIN — INSULIN GLARGINE 12 UNIT(S): 100 INJECTION, SOLUTION SUBCUTANEOUS at 21:23

## 2022-06-17 RX ADMIN — HEPARIN SODIUM 5000 UNIT(S): 5000 INJECTION INTRAVENOUS; SUBCUTANEOUS at 17:45

## 2022-06-17 RX ADMIN — Medication 50 MILLIGRAM(S): at 12:18

## 2022-06-17 RX ADMIN — Medication 1 APPLICATION(S): at 12:17

## 2022-06-17 RX ADMIN — Medication 667 MILLIGRAM(S): at 12:19

## 2022-06-17 RX ADMIN — Medication 60 MILLIGRAM(S): at 13:30

## 2022-06-17 RX ADMIN — CEFTRIAXONE 100 MILLIGRAM(S): 500 INJECTION, POWDER, FOR SOLUTION INTRAMUSCULAR; INTRAVENOUS at 13:33

## 2022-06-17 RX ADMIN — Medication 0.1 MILLIGRAM(S): at 17:46

## 2022-06-17 RX ADMIN — Medication 100 MILLIGRAM(S): at 17:45

## 2022-06-17 RX ADMIN — Medication 60 MILLIGRAM(S): at 05:06

## 2022-06-17 RX ADMIN — Medication 2: at 12:16

## 2022-06-17 RX ADMIN — Medication 0.1 MILLIGRAM(S): at 05:06

## 2022-06-17 RX ADMIN — Medication 1: at 17:44

## 2022-06-17 RX ADMIN — Medication 667 MILLIGRAM(S): at 08:22

## 2022-06-17 RX ADMIN — Medication 81 MILLIGRAM(S): at 12:17

## 2022-06-17 RX ADMIN — Medication 1: at 08:21

## 2022-06-17 RX ADMIN — AMLODIPINE BESYLATE 5 MILLIGRAM(S): 2.5 TABLET ORAL at 05:06

## 2022-06-17 NOTE — PROGRESS NOTE ADULT - ASSESSMENT
72 yo woman with PMH of HTN, DM2, CAD, CHF, PAD, A.fib, multiple falls and ESRD on HD was admitted on 6/16, came to ED with generalized weakness.   Her hypoglycemia and hypothermia on admission could be related to sepsis/SIRS. Source likely sec to Right hallux with a dry gangrene and superimposed infection.   TOMMY/PVR's   Cont abx as per ID  Will need diagnostic angio scheduled for 6/21/22 with Dr Miller and likely RLE bypass based on previous CTA findings  Medical and cardiac optimization and risk assessment prior to procedure appreciated  Podiatry consult for possible intervention; may proceed from a vascular perspective for source control and vasc procedure can follow for healing  Discussed with Dr Miller

## 2022-06-17 NOTE — PROGRESS NOTE ADULT - ASSESSMENT
pt with hypoglycemia  elevated troponin due to renal failure  ashd , s/p mi  s/p coronary stent  s/p cabg  [id number]  esrd - on hd  dm2  hypertension  paf -not on oac - fall risk  pvd - s/p stent  dyslipidemia

## 2022-06-17 NOTE — CONSULT NOTE ADULT - ASSESSMENT
Right hallux gangrene with demarcation due to early signs of autoamputation & Left ankle superficial ulcer

## 2022-06-17 NOTE — PROGRESS NOTE ADULT - SUBJECTIVE AND OBJECTIVE BOX
Patient is a 71y old  Female who presents with a chief complaint of SIRS (2022 11:44)      INTERVAL HPI/OVERNIGHT EVENTS:    MEDICATIONS  (STANDING):  amLODIPine   Tablet 5 milliGRAM(s) Oral daily  aspirin enteric coated 81 milliGRAM(s) Oral daily  atorvastatin 40 milliGRAM(s) Oral at bedtime  calcium acetate 667 milliGRAM(s) Oral three times a day with meals  cefTRIAXone   IVPB 1000 milliGRAM(s) IV Intermittent every 24 hours  cloNIDine 0.1 milliGRAM(s) Oral two times a day  clopidogrel Tablet 75 milliGRAM(s) Oral daily  dextrose 5%. 1000 milliLiter(s) (100 mL/Hr) IV Continuous <Continuous>  dextrose 5%. 1000 milliLiter(s) (50 mL/Hr) IV Continuous <Continuous>  dextrose 50% Injectable 25 Gram(s) IV Push once  dextrose 50% Injectable 12.5 Gram(s) IV Push once  dextrose 50% Injectable 25 Gram(s) IV Push once  diltiazem    Tablet 60 milliGRAM(s) Oral every 8 hours  glucagon  Injectable 1 milliGRAM(s) IntraMuscular once  heparin   Injectable 5000 Unit(s) SubCutaneous every 12 hours  imipramine 50 milliGRAM(s) Oral daily  insulin glargine Injectable (LANTUS) 12 Unit(s) SubCutaneous at bedtime  insulin lispro (ADMELOG) corrective regimen sliding scale   SubCutaneous three times a day before meals  insulin lispro (ADMELOG) corrective regimen sliding scale   SubCutaneous at bedtime  metoprolol tartrate 100 milliGRAM(s) Oral every 12 hours  pantoprazole    Tablet 40 milliGRAM(s) Oral before breakfast  povidone iodine 10% Solution 1 Application(s) Topical daily    MEDICATIONS  (PRN):  acetaminophen     Tablet .. 650 milliGRAM(s) Oral every 6 hours PRN Temp greater or equal to 38C (100.4F), Mild Pain (1 - 3)  aluminum hydroxide/magnesium hydroxide/simethicone Suspension 30 milliLiter(s) Oral every 4 hours PRN Dyspepsia  dextrose Oral Gel 15 Gram(s) Oral once PRN Blood Glucose LESS THAN 70 milliGRAM(s)/deciliter  melatonin 3 milliGRAM(s) Oral at bedtime PRN Insomnia  ondansetron Injectable 4 milliGRAM(s) IV Push every 8 hours PRN Nausea and/or Vomiting      Allergies    latex (Unknown)  No Known Drug Allergies    Intolerances        REVIEW OF SYSTEMS:  CONSTITUTIONAL: No fever or chills  HEENT:  No headache, no sore throat  RESPIRATORY: No cough, wheezing, or shortness of breath  CARDIOVASCULAR: No chest pain, palpitations, or leg swelling  GASTROINTESTINAL: No abd pain, nausea, vomiting, or diarrhea  GENITOURINARY: No dysuria, frequency, or hematuria  NEUROLOGICAL: no focal weakness or dizziness  MUSCULOSKELETAL: no myalgias     Vital Signs Last 24 Hrs  T(C): 36.7 (2022 12:11), Max: 36.8 (2022 18:34)  T(F): 98.1 (2022 12:11), Max: 98.2 (2022 18:34)  HR: 66 (2022 12:11) (66 - 77)  BP: 134/64 (2022 12:11) (122/58 - 166/91)  BP(mean): --  RR: 18 (2022 04:38) (16 - 18)  SpO2: 95% (2022 12:11) (93% - 96%)    PHYSICAL EXAM:  GENERAL: NAD  HEENT:  NC/AT, EOMI, moist mucous membranes  CHEST/LUNG:  CTA b/l, no rales, wheezes, or rhonchi  HEART:  RRR, S1, S2  ABDOMEN:  BS+, soft, nontender, nondistended  EXTREMITIES: no edema, cyanosis, or calf tenderness  NERVOUS SYSTEM: AA&Ox3    LABS:                        11.6   12.79 )-----------( 244      ( 2022 07:52 )             37.3     CBC Full  -  ( 2022 07:52 )  WBC Count : 12.79 K/uL  Hemoglobin : 11.6 g/dL  Hematocrit : 37.3 %  Platelet Count - Automated : 244 K/uL  Mean Cell Volume : 93.0 fl  Mean Cell Hemoglobin : 28.9 pg  Mean Cell Hemoglobin Concentration : 31.1 gm/dL  Auto Neutrophil # : 9.69 K/uL  Auto Lymphocyte # : 1.11 K/uL  Auto Monocyte # : 1.61 K/uL  Auto Eosinophil # : 0.18 K/uL  Auto Basophil # : 0.02 K/uL  Auto Neutrophil % : 75.7 %  Auto Lymphocyte % : 8.7 %  Auto Monocyte % : 12.6 %  Auto Eosinophil % : 1.4 %  Auto Basophil % : 0.2 %    2022 07:52    135    |  97     |  26     ----------------------------<  168    4.2     |  26     |  3.60     Ca    8.7        2022 07:52    TPro  6.6    /  Alb  2.8    /  TBili  0.5    /  DBili  x      /  AST  31     /  ALT  26     /  AlkPhos  113    2022 07:52    PT/INR - ( 15 Justin 2022 22:45 )   PT: 13.4 sec;   INR: 1.14 ratio         PTT - ( 15 Justin 2022 22:45 )  PTT:27.3 sec  Urinalysis Basic - ( 15 Justin 2022 23:20 )    Color: Yellow / Appearance: Clear / S.010 / pH: x  Gluc: x / Ketone: Small  / Bili: Negative / Urobili: 1   Blood: x / Protein: Negative / Nitrite: Negative   Leuk Esterase: Negative / RBC: x / WBC x   Sq Epi: x / Non Sq Epi: x / Bacteria: x      CAPILLARY BLOOD GLUCOSE      POCT Blood Glucose.: 241 mg/dL (2022 11:53)  POCT Blood Glucose.: 166 mg/dL (2022 08:08)  POCT Blood Glucose.: 192 mg/dL (2022 21:17)  POCT Blood Glucose.: 109 mg/dL (2022 16:29)        Culture - Urine (collected 22 @ 03:40)  Source: Clean Catch Clean Catch (Midstream)  Final Report (22 @ 07:32):    <10,000 CFU/mL Normal Urogenital Shantel    Culture - Blood (collected 22 @ 03:38)  Source: .Blood Blood-Peripheral  Preliminary Report (22 @ 04:01):    No growth to date.    Culture - Blood (collected 22 @ 03:38)  Source: .Blood Blood-Peripheral  Preliminary Report (22 @ 04:00):    No growth to date.        RADIOLOGY & ADDITIONAL TESTS:    Personally reviewed.     Consultant(s) Notes Reviewed:  [x] YES  [ ] NO    Care Discussed with [x] Consultants  [x] Patient  [ ] Family  [ ]      [ ] Other; RN  DVT ppx   Patient is a 71y old  Female who presents with a chief complaint of SIRS (2022 11:44)      INTERVAL HPI/OVERNIGHT EVENTS: Pt seen and examined at bedside. has no complaints.     MEDICATIONS  (STANDING):  amLODIPine   Tablet 5 milliGRAM(s) Oral daily  aspirin enteric coated 81 milliGRAM(s) Oral daily  atorvastatin 40 milliGRAM(s) Oral at bedtime  calcium acetate 667 milliGRAM(s) Oral three times a day with meals  cefTRIAXone   IVPB 1000 milliGRAM(s) IV Intermittent every 24 hours  cloNIDine 0.1 milliGRAM(s) Oral two times a day  clopidogrel Tablet 75 milliGRAM(s) Oral daily  dextrose 5%. 1000 milliLiter(s) (100 mL/Hr) IV Continuous <Continuous>  dextrose 5%. 1000 milliLiter(s) (50 mL/Hr) IV Continuous <Continuous>  dextrose 50% Injectable 25 Gram(s) IV Push once  dextrose 50% Injectable 12.5 Gram(s) IV Push once  dextrose 50% Injectable 25 Gram(s) IV Push once  diltiazem    Tablet 60 milliGRAM(s) Oral every 8 hours  glucagon  Injectable 1 milliGRAM(s) IntraMuscular once  heparin   Injectable 5000 Unit(s) SubCutaneous every 12 hours  imipramine 50 milliGRAM(s) Oral daily  insulin glargine Injectable (LANTUS) 12 Unit(s) SubCutaneous at bedtime  insulin lispro (ADMELOG) corrective regimen sliding scale   SubCutaneous three times a day before meals  insulin lispro (ADMELOG) corrective regimen sliding scale   SubCutaneous at bedtime  metoprolol tartrate 100 milliGRAM(s) Oral every 12 hours  pantoprazole    Tablet 40 milliGRAM(s) Oral before breakfast  povidone iodine 10% Solution 1 Application(s) Topical daily    MEDICATIONS  (PRN):  acetaminophen     Tablet .. 650 milliGRAM(s) Oral every 6 hours PRN Temp greater or equal to 38C (100.4F), Mild Pain (1 - 3)  aluminum hydroxide/magnesium hydroxide/simethicone Suspension 30 milliLiter(s) Oral every 4 hours PRN Dyspepsia  dextrose Oral Gel 15 Gram(s) Oral once PRN Blood Glucose LESS THAN 70 milliGRAM(s)/deciliter  melatonin 3 milliGRAM(s) Oral at bedtime PRN Insomnia  ondansetron Injectable 4 milliGRAM(s) IV Push every 8 hours PRN Nausea and/or Vomiting      Allergies    latex (Unknown)  No Known Drug Allergies    Intolerances        REVIEW OF SYSTEMS:  CONSTITUTIONAL: No fever or chills  HEENT:  No headache, no sore throat  RESPIRATORY: No cough, wheezing, or shortness of breath  CARDIOVASCULAR: No chest pain, palpitations, or leg swelling  GASTROINTESTINAL: No abd pain, nausea, vomiting, or diarrhea  GENITOURINARY: No dysuria, frequency, or hematuria  NEUROLOGICAL: no focal weakness or dizziness  MUSCULOSKELETAL: no myalgias     Vital Signs Last 24 Hrs  T(C): 36.7 (2022 12:11), Max: 36.8 (2022 18:34)  T(F): 98.1 (2022 12:11), Max: 98.2 (2022 18:34)  HR: 66 (2022 12:11) (66 - 77)  BP: 134/64 (2022 12:11) (122/58 - 166/91)  BP(mean): --  RR: 18 (2022 04:38) (16 - 18)  SpO2: 95% (2022 12:11) (93% - 96%)    PHYSICAL EXAM:  GENERAL: elderly F in NAD  HEENT:  NC/AT, EOMI, moist mucous membranes  CHEST/LUNG:  CTA b/l, no rales, wheezes, or rhonchi  HEART:  RRR, S1, S2  ABDOMEN:  BS+, soft, nontender, nondistended  EXTREMITIES: R great hallux dry gangrene  NERVOUS SYSTEM: AA&Ox3    LABS:                        11.6   12.79 )-----------( 244      ( 2022 07:52 )             37.3     CBC Full  -  ( 2022 07:52 )  WBC Count : 12.79 K/uL  Hemoglobin : 11.6 g/dL  Hematocrit : 37.3 %  Platelet Count - Automated : 244 K/uL  Mean Cell Volume : 93.0 fl  Mean Cell Hemoglobin : 28.9 pg  Mean Cell Hemoglobin Concentration : 31.1 gm/dL  Auto Neutrophil # : 9.69 K/uL  Auto Lymphocyte # : 1.11 K/uL  Auto Monocyte # : 1.61 K/uL  Auto Eosinophil # : 0.18 K/uL  Auto Basophil # : 0.02 K/uL  Auto Neutrophil % : 75.7 %  Auto Lymphocyte % : 8.7 %  Auto Monocyte % : 12.6 %  Auto Eosinophil % : 1.4 %  Auto Basophil % : 0.2 %    2022 07:52    135    |  97     |  26     ----------------------------<  168    4.2     |  26     |  3.60     Ca    8.7        2022 07:52    TPro  6.6    /  Alb  2.8    /  TBili  0.5    /  DBili  x      /  AST  31     /  ALT  26     /  AlkPhos  113    2022 07:52    PT/INR - ( 15 Justin 2022 22:45 )   PT: 13.4 sec;   INR: 1.14 ratio         PTT - ( 15 Justin 2022 22:45 )  PTT:27.3 sec  Urinalysis Basic - ( 15 Justin 2022 23:20 )    Color: Yellow / Appearance: Clear / S.010 / pH: x  Gluc: x / Ketone: Small  / Bili: Negative / Urobili: 1   Blood: x / Protein: Negative / Nitrite: Negative   Leuk Esterase: Negative / RBC: x / WBC x   Sq Epi: x / Non Sq Epi: x / Bacteria: x      CAPILLARY BLOOD GLUCOSE      POCT Blood Glucose.: 241 mg/dL (2022 11:53)  POCT Blood Glucose.: 166 mg/dL (2022 08:08)  POCT Blood Glucose.: 192 mg/dL (2022 21:17)  POCT Blood Glucose.: 109 mg/dL (2022 16:29)        Culture - Urine (collected 22 @ 03:40)  Source: Clean Catch Clean Catch (Midstream)  Final Report (22 @ 07:32):    <10,000 CFU/mL Normal Urogenital Shantel    Culture - Blood (collected 22 @ 03:38)  Source: .Blood Blood-Peripheral  Preliminary Report (22 @ 04:01):    No growth to date.    Culture - Blood (collected 22 @ 03:38)  Source: .Blood Blood-Peripheral  Preliminary Report (22 @ 04:00):    No growth to date.        RADIOLOGY & ADDITIONAL TESTS:    Personally reviewed.     Consultant(s) Notes Reviewed:  [x] YES  [ ] NO    Care Discussed with [x] Consultants  [x] Patient  [ ] Family  [ ]      [ ] Other; RN  DVT ppx

## 2022-06-17 NOTE — CONSULT NOTE ADULT - PROBLEM SELECTOR RECOMMENDATION 9
Pt seen and evalautaed  - Right hallux dry gangrene with signs of demarcation.  - Patient will need surgical intervention, amputation. During past admission pt has been refusing any surgical intervention. Pt was agitated during evaluation to answer any pertinent questions.  - [facility] recs appreciated: scheduled for procedure 6/21  - Continue IV abx  - Please provide medical clearance for possible right hallux amputation for next week if pt is agreeable to plan    Podiatry to follow Pt seen and evaluated  - Right hallux dry gangrene with signs of demarcation due to early signs of autoamputation.  - Patient will need surgical intervention, amputation. During past admission pt has been refusing any surgical intervention. Pt was agitated during evaluation to answer any pertinent questions.  - [facility] recs appreciated: scheduled for Agio 6/21  - Continue IV abx per ID recs  - Continue med management  - Plan for possible right hallux amputation after vascular intervention for next week if pt is agreeable to plan  - Please provide medical clearance for possible right hallux amputation next week    Podiatry to follow

## 2022-06-17 NOTE — PROGRESS NOTE ADULT - SUBJECTIVE AND OBJECTIVE BOX
Patient is a 71y Female with a known history of :  SIRS (systemic inflammatory response syndrome) [R65.10]    Hypoglycemia [E16.2]    Pleural effusion [J90]    CHF, acute [I50.9]    Chronic CHF [I50.9]    Elevated troponin [R77.8]    Atrial fibrillation [I48.91]    Benign essential HTN [I10]    HLD (hyperlipidemia) [E78.5]    Depression, major [F32.9]    Need for prophylactic measure [Z29.9]    ESRD on hemodialysis [N18.6]    T2DM (type 2 diabetes mellitus) [E11.9]    HFrEF (heart failure with reduced ejection fraction) [I50.20]      HPI:  Patient is a 71 year old female with PMH of A.fib rate controlled not on AC for hx of multiple falls, T2DM, HTN, HLD, ESRD on HD, CHF, s/p CABG brought in by EMS for generalized weakness at home. Patient states that she was doing well when in the afternoon she tried to get up from her couch and felt weak in the LE and fell back in her couch. Denies any hx of head trauma or loss of consciousness. Denies any weakness or numbness. Denies any chest pain, SOB or palpitations. No fever, cough or chills. No hx of recent travel or sick contacts. At the time of EMS arrival patient was found to have POCBS of 50. EMS gave dextrose and on arrival to the ED patient blood sugar was found to be in 30's with hypothermia of 94.9.    ED course:   Vitals:   BP: 176/85  HR:76  Temp:94.2  RR:18, O2:96% on RA   Significant Labs:   CBC: WBC:16.82 Hb:13.2 , Platlets: 260, Neutro:89   CMP: Lytes: WNL, BUN/Creatinine:48/4.8, Glucose:134 , Alkaline Phos:128, AST, 41, eGFR: 9, HsTrop: 275.9, ProBNP: [id number], Lactate: 2  UA; negative  CXR: Heart and lung appear normal (self read)  CT BRAIN: No acute intracranial bleeding. Central volume loss, chronic microvascular ischemic changes, and chronic bilateral lacunar infarctions.  CT CERVICAL SPINE: No fracture. Grade 1 C4-C5 anterolisthesis. C6-C7 disc degeneration. Bilateral pleural effusions and interlobular septal thickening due to   interstitial edema.  EKG: NSR, left axis deviation, HR 71,   QT/QTc: 426/462  Patient received: Ceftriaxone 1 g x1, Dextrose 5% + NS 1L x1, Dextrose 50% IV X1, heating blanket, 2L NC   (16 Jun 2022 01:19)      REVIEW OF SYSTEMS:    CONSTITUTIONAL: No fever, weight loss, or fatigue  EYES: No eye pain, visual disturbances, or discharge  ENMT:  No difficulty hearing, tinnitus, vertigo; No sinus or throat pain  NECK: No pain or stiffness  BREASTS: No pain, masses, or nipple discharge  RESPIRATORY: No cough, wheezing, chills or hemoptysis; No shortness of breath  CARDIOVASCULAR: No chest pain, palpitations, dizziness, or leg swelling  GASTROINTESTINAL: No abdominal or epigastric pain. No nausea, vomiting, or hematemesis; No diarrhea or constipation. No melena or hematochezia.  GENITOURINARY: No dysuria, frequency, hematuria, or incontinence  NEUROLOGICAL: No headaches, memory loss, loss of strength, numbness, or tremors  SKIN: No itching, burning, rashes, or lesions   LYMPH NODES: No enlarged glands  ENDOCRINE: No heat or cold intolerance; No hair loss  MUSCULOSKELETAL: No joint pain or swelling; No muscle, back, or extremity pain  PSYCHIATRIC: No depression, anxiety, mood swings, or difficulty sleeping  HEME/LYMPH: No easy bruising, or bleeding gums  ALLERGY AND IMMUNOLOGIC: No hives or eczema    MEDICATIONS  (STANDING):  amLODIPine   Tablet 5 milliGRAM(s) Oral daily  aspirin enteric coated 81 milliGRAM(s) Oral daily  atorvastatin 40 milliGRAM(s) Oral at bedtime  calcium acetate 667 milliGRAM(s) Oral three times a day with meals  cefTRIAXone   IVPB 1000 milliGRAM(s) IV Intermittent every 24 hours  cloNIDine 0.1 milliGRAM(s) Oral two times a day  clopidogrel Tablet 75 milliGRAM(s) Oral daily  dextrose 5%. 1000 milliLiter(s) (50 mL/Hr) IV Continuous <Continuous>  dextrose 5%. 1000 milliLiter(s) (100 mL/Hr) IV Continuous <Continuous>  dextrose 50% Injectable 25 Gram(s) IV Push once  dextrose 50% Injectable 12.5 Gram(s) IV Push once  dextrose 50% Injectable 25 Gram(s) IV Push once  diltiazem    Tablet 60 milliGRAM(s) Oral every 8 hours  glucagon  Injectable 1 milliGRAM(s) IntraMuscular once  heparin   Injectable 5000 Unit(s) SubCutaneous every 12 hours  imipramine 50 milliGRAM(s) Oral daily  insulin glargine Injectable (LANTUS) 12 Unit(s) SubCutaneous at bedtime  insulin lispro (ADMELOG) corrective regimen sliding scale   SubCutaneous three times a day before meals  insulin lispro (ADMELOG) corrective regimen sliding scale   SubCutaneous at bedtime  metoprolol tartrate 100 milliGRAM(s) Oral every 12 hours  pantoprazole    Tablet 40 milliGRAM(s) Oral before breakfast  povidone iodine 10% Solution 1 Application(s) Topical daily    MEDICATIONS  (PRN):  acetaminophen     Tablet .. 650 milliGRAM(s) Oral every 6 hours PRN Temp greater or equal to 38C (100.4F), Mild Pain (1 - 3)  aluminum hydroxide/magnesium hydroxide/simethicone Suspension 30 milliLiter(s) Oral every 4 hours PRN Dyspepsia  dextrose Oral Gel 15 Gram(s) Oral once PRN Blood Glucose LESS THAN 70 milliGRAM(s)/deciliter  melatonin 3 milliGRAM(s) Oral at bedtime PRN Insomnia  ondansetron Injectable 4 milliGRAM(s) IV Push every 8 hours PRN Nausea and/or Vomiting      ALLERGIES: latex (Unknown)  No Known Drug Allergies      FAMILY HISTORY:  No pertinent family history in first degree relatives        PHYSICAL EXAMINATION:  -----------------------------  T(C): 36.4 (06-17-22 @ 04:38), Max: 36.8 (06-16-22 @ 18:34)  HR: 68 (06-17-22 @ 04:38) (66 - 77)  BP: 166/91 (06-17-22 @ 04:38) (122/58 - 166/91)  RR: 18 (06-17-22 @ 04:38) (16 - 18)  SpO2: 93% (06-17-22 @ 04:38) (93% - 96%)  Wt(kg): --    06-16 @ 07:01  -  06-17 @ 07:00  --------------------------------------------------------  IN:  Total IN: 0 mL    OUT:    Other (mL): 2000 mL  Total OUT: 2000 mL    Total NET: -2000 mL            VITALS  T(C): 36.4 (06-17-22 @ 04:38), Max: 36.8 (06-16-22 @ 18:34)  HR: 68 (06-17-22 @ 04:38) (66 - 77)  BP: 166/91 (06-17-22 @ 04:38) (122/58 - 166/91)  RR: 18 (06-17-22 @ 04:38) (16 - 18)  SpO2: 93% (06-17-22 @ 04:38) (93% - 96%)    Constitutional: well developed, normal appearance, well groomed, well nourished, no deformities and no acute distress.   Eyes: the conjunctiva exhibited no abnormalities and the eyelids demonstrated no xanthelasmas.   HEENT: normal oral mucosa, no oral pallor and no oral cyanosis.   Neck: normal jugular venous A waves present, normal jugular venous V waves present and no jugular venous wilson A waves.   Pulmonary: no respiratory distress, normal respiratory rhythm and effort, no accessory muscle use and lungs were clear to auscultation bilaterally.   Cardiovascular: heart rate and rhythm were normal, normal S1 and S2 and no murmur, gallop, rub, heave or thrill are present.   Abdomen: soft, non-tender, no hepato-splenomegaly and no abdominal mass palpated.   Musculoskeletal: the gait could not be assessed..   Extremities: no clubbing of the fingernails, no localized cyanosis, no petechial hemorrhages and no ischemic changes.   Skin: normal skin color and pigmentation, no rash, no venous stasis, no skin lesions, no skin ulcer and no xanthoma was observed.   Psychiatric: oriented to person, place, and time, the affect was normal, the mood was normal and not feeling anxious.     LABS:   --------  06-16    136  |  97  |  56<H>  ----------------------------<  244<H>  4.5   |  29  |  5.40<H>    Ca    8.9      16 Jun 2022 11:57    TPro  6.3  /  Alb  2.7<L>  /  TBili  0.5  /  DBili  x   /  AST  26  /  ALT  27  /  AlkPhos  116  06-16                         11.1   16.09 )-----------( 284      ( 16 Jun 2022 11:57 )             35.1     PT/INR - ( 15 Justin 2022 22:45 )   PT: 13.4 sec;   INR: 1.14 ratio         PTT - ( 15 Justin 2022 22:45 )  PTT:27.3 sec        Culture Results:   No growth to date. (06-16 @ 03:38)  Culture Results:   No growth to date. (06-16 @ 03:38)      RADIOLOGY:  -----------------    ECG:     ECHO:

## 2022-06-17 NOTE — CONSULT NOTE ADULT - SUBJECTIVE AND OBJECTIVE BOX
HPI:  Patient is a 71 year old female with PMH of A.fib rate controlled not on AC for hx of multiple falls, T2DM, HTN, HLD, ESRD on HD, CHF, s/p CABG brought in by EMS for generalized weakness at home. Patient states that she was doing well when in the afternoon she tried to get up from her couch and felt weak in the LE and fell back in her couch. Denies any hx of head trauma or loss of consciousness. Denies any weakness or numbness. Denies any chest pain, SOB or palpitations. No fever, cough or chills. No hx of recent travel or sick contacts. At the time of EMS arrival patient was found to have POCBS of 50. EMS gave dextrose and on arrival to the ED patient blood sugar was found to be in 30's with hypothermia of 94.9.    ED course:   Vitals:   BP: 176/85  HR:76  Temp:94.2  RR:18, O2:96% on RA   Significant Labs:   CBC: WBC:16.82 Hb:13.2 , Platlets: 260, Neutro:89   CMP: Lytes: WNL, BUN/Creatinine:48/4.8, Glucose:134 , Alkaline Phos:128, AST, 41, eGFR: 9, HsTrop: 275.9, ProBNP: [id number], Lactate: 2  UA; negative  CXR: Heart and lung appear normal (self read)  CT BRAIN: No acute intracranial bleeding. Central volume loss, chronic microvascular ischemic changes, and chronic bilateral lacunar infarctions.  CT CERVICAL SPINE: No fracture. Grade 1 C4-C5 anterolisthesis. C6-C7 disc degeneration. Bilateral pleural effusions and interlobular septal thickening due to   interstitial edema.  EKG: NSR, left axis deviation, HR 71,   QT/QTc: 426/462  Patient received: Ceftriaxone 1 g x1, Dextrose 5% + NS 1L x1, Dextrose 50% IV X1, heating blanket, 2L NC   (16 Jun 2022 01:19)      71y year old Female seen at [facility] 205 D1 for ----------.  Denies any fever, chills, nausea, vomiting, chest pain, shortness of breath, or calf pain at this time.    REVIEW OF SYSTEMS    PAST MEDICAL & SURGICAL HISTORY:  Diabetes mellitus II      HTN (hypertension)      h/o Anxiety attack      Depression      h/o Myocardial infarct 2007      CAD (coronary artery disease)      h/o Hepatitis A 1969  currently resolved, no symptoms      PAD (peripheral artery disease)      Murmur, cardiac      h/o Smoking  quitted 3/2012      CRF (chronic renal failure), unspecified stage      Dialysis patient      Anemia secondary to renal failure      HTN (hypertension)      coronary stent 2007      s/p Ovarian cyst removal      s/p surgical removal of benign Skin lesion epigastric area          Allergies    latex (Unknown)  No Known Drug Allergies    Intolerances        MEDICATIONS  (STANDING):  amLODIPine   Tablet 5 milliGRAM(s) Oral daily  aspirin enteric coated 81 milliGRAM(s) Oral daily  atorvastatin 40 milliGRAM(s) Oral at bedtime  calcium acetate 667 milliGRAM(s) Oral three times a day with meals  cefTRIAXone   IVPB 1000 milliGRAM(s) IV Intermittent every 24 hours  cloNIDine 0.1 milliGRAM(s) Oral two times a day  clopidogrel Tablet 75 milliGRAM(s) Oral daily  dextrose 5%. 1000 milliLiter(s) (50 mL/Hr) IV Continuous <Continuous>  dextrose 5%. 1000 milliLiter(s) (100 mL/Hr) IV Continuous <Continuous>  dextrose 50% Injectable 25 Gram(s) IV Push once  dextrose 50% Injectable 12.5 Gram(s) IV Push once  dextrose 50% Injectable 25 Gram(s) IV Push once  diltiazem    Tablet 60 milliGRAM(s) Oral every 8 hours  glucagon  Injectable 1 milliGRAM(s) IntraMuscular once  heparin   Injectable 5000 Unit(s) SubCutaneous every 12 hours  imipramine 50 milliGRAM(s) Oral daily  insulin glargine Injectable (LANTUS) 12 Unit(s) SubCutaneous at bedtime  insulin lispro (ADMELOG) corrective regimen sliding scale   SubCutaneous three times a day before meals  insulin lispro (ADMELOG) corrective regimen sliding scale   SubCutaneous at bedtime  metoprolol tartrate 100 milliGRAM(s) Oral every 12 hours  pantoprazole    Tablet 40 milliGRAM(s) Oral before breakfast  povidone iodine 10% Solution 1 Application(s) Topical daily    MEDICATIONS  (PRN):  acetaminophen     Tablet .. 650 milliGRAM(s) Oral every 6 hours PRN Temp greater or equal to 38C (100.4F), Mild Pain (1 - 3)  aluminum hydroxide/magnesium hydroxide/simethicone Suspension 30 milliLiter(s) Oral every 4 hours PRN Dyspepsia  dextrose Oral Gel 15 Gram(s) Oral once PRN Blood Glucose LESS THAN 70 milliGRAM(s)/deciliter  melatonin 3 milliGRAM(s) Oral at bedtime PRN Insomnia  ondansetron Injectable 4 milliGRAM(s) IV Push every 8 hours PRN Nausea and/or Vomiting      Social History:  Tobacco: No  EtOH: No  recreational drug use: No  Lives with:   Ambulates: With walker  ADLs: needs assistance   Occupation: retired   Vaccinations: COVID Pfizer x2 (16 Jun 2022 01:19)      FAMILY HISTORY:  No pertinent family history in first degree relatives        Vital Signs Last 24 Hrs  T(C): 36.4 (17 Jun 2022 04:38), Max: 36.8 (16 Jun 2022 18:34)  T(F): 97.5 (17 Jun 2022 04:38), Max: 98.2 (16 Jun 2022 18:34)  HR: 68 (17 Jun 2022 04:38) (66 - 77)  BP: 166/91 (17 Jun 2022 04:38) (122/58 - 166/91)  BP(mean): --  RR: 18 (17 Jun 2022 04:38) (16 - 18)  SpO2: 93% (17 Jun 2022 04:38) (93% - 96%)    PHYSICAL EXAM:  Vascular: DP & PT palpable bilaterally, Capillary refill 3 seconds, Digital hair present bilaterally  Neurological: Light touch sensation intact bilaterally  Musculoskeletal: 5/5 strength in all quadrants bilaterally, AJ & STJ ROM intact  Dermatological: ---------- cm ulceration noted to the ----------, granular wound bed, no probe to bone, no periwound erythema, no fluctuance, no malodor, no proximal streaking at this time    CBC Full  -  ( 17 Jun 2022 07:52 )  WBC Count : 12.79 K/uL  RBC Count : 4.01 M/uL  Hemoglobin : 11.6 g/dL  Hematocrit : 37.3 %  Platelet Count - Automated : 244 K/uL  Mean Cell Volume : 93.0 fl  Mean Cell Hemoglobin : 28.9 pg  Mean Cell Hemoglobin Concentration : 31.1 gm/dL  Auto Neutrophil # : 9.69 K/uL  Auto Lymphocyte # : 1.11 K/uL  Auto Monocyte # : 1.61 K/uL  Auto Eosinophil # : 0.18 K/uL  Auto Basophil # : 0.02 K/uL  Auto Neutrophil % : 75.7 %  Auto Lymphocyte % : 8.7 %  Auto Monocyte % : 12.6 %  Auto Eosinophil % : 1.4 %  Auto Basophil % : 0.2 %      ----------CHEM PANEL----------    PT/INR - ( 15 Justin 2022 22:45 )   PT: 13.4 sec;   INR: 1.14 ratio         PTT - ( 15 Justin 2022 22:45 )  PTT:27.3 sec      Culture - Urine (collected 16 Jun 2022 03:40)  Source: Clean Catch Clean Catch (Midstream)  Final Report (17 Jun 2022 07:32):    <10,000 CFU/mL Normal Urogenital Shantel    Culture - Blood (collected 16 Jun 2022 03:38)  Source: .Blood Blood-Peripheral  Preliminary Report (17 Jun 2022 04:01):    No growth to date.    Culture - Blood (collected 16 Jun 2022 03:38)  Source: .Blood Blood-Peripheral  Preliminary Report (17 Jun 2022 04:00):    No growth to date.        Imaging: ----------   HPI:  Patient is a 71 year old female with PMH of A.fib rate controlled not on AC for hx of multiple falls, T2DM, HTN, HLD, ESRD on HD, CHF, s/p CABG brought in by EMS for generalized weakness at home. Patient states that she was doing well when in the afternoon she tried to get up from her couch and felt weak in the LE and fell back in her couch. Denies any hx of head trauma or loss of consciousness. Denies any weakness or numbness. Denies any chest pain, SOB or palpitations. No fever, cough or chills. No hx of recent travel or sick contacts. At the time of EMS arrival patient was found to have POCBS of 50. EMS gave dextrose and on arrival to the ED patient blood sugar was found to be in 30's with hypothermia of 94.9.    ED course:   Vitals:   BP: 176/85  HR:76  Temp:94.2  RR:18, O2:96% on RA   Significant Labs:   CBC: WBC:16.82 Hb:13.2 , Platlets: 260, Neutro:89   CMP: Lytes: WNL, BUN/Creatinine:48/4.8, Glucose:134 , Alkaline Phos:128, AST, 41, eGFR: 9, HsTrop: 275.9, ProBNP: 344653, Lactate: 2  UA; negative  CXR: Heart and lung appear normal (self read)  CT BRAIN: No acute intracranial bleeding. Central volume loss, chronic microvascular ischemic changes, and chronic bilateral lacunar infarctions.  CT CERVICAL SPINE: No fracture. Grade 1 C4-C5 anterolisthesis. C6-C7 disc degeneration. Bilateral pleural effusions and interlobular septal thickening due to   interstitial edema.  EKG: NSR, left axis deviation, HR 71,   QT/QTc: 426/462  Patient received: Ceftriaxone 1 g x1, Dextrose 5% + NS 1L x1, Dextrose 50% IV X1, heating blanket, 2L NC   (16 Jun 2022 01:19)      71y year old Female seen at bedside for Right hallux infection and left ankle ulcer  Denies any fever, chills, nausea, vomiting, chest pain, shortness of breath, or calf pain at this time.    REVIEW OF SYSTEMS    PAST MEDICAL & SURGICAL HISTORY:  Diabetes mellitus II      HTN (hypertension)      h/o Anxiety attack      Depression      h/o Myocardial infarct 2007      CAD (coronary artery disease)      h/o Hepatitis A 1969  currently resolved, no symptoms      PAD (peripheral artery disease)      Murmur, cardiac      h/o Smoking  quitted 3/2012      CRF (chronic renal failure), unspecified stage      Dialysis patient      Anemia secondary to renal failure      HTN (hypertension)      coronary stent 2007      s/p Ovarian cyst removal      s/p surgical removal of benign Skin lesion epigastric area          Allergies    latex (Unknown)  No Known Drug Allergies    Intolerances        MEDICATIONS  (STANDING):  amLODIPine   Tablet 5 milliGRAM(s) Oral daily  aspirin enteric coated 81 milliGRAM(s) Oral daily  atorvastatin 40 milliGRAM(s) Oral at bedtime  calcium acetate 667 milliGRAM(s) Oral three times a day with meals  cefTRIAXone   IVPB 1000 milliGRAM(s) IV Intermittent every 24 hours  cloNIDine 0.1 milliGRAM(s) Oral two times a day  clopidogrel Tablet 75 milliGRAM(s) Oral daily  dextrose 5%. 1000 milliLiter(s) (50 mL/Hr) IV Continuous <Continuous>  dextrose 5%. 1000 milliLiter(s) (100 mL/Hr) IV Continuous <Continuous>  dextrose 50% Injectable 25 Gram(s) IV Push once  dextrose 50% Injectable 12.5 Gram(s) IV Push once  dextrose 50% Injectable 25 Gram(s) IV Push once  diltiazem    Tablet 60 milliGRAM(s) Oral every 8 hours  glucagon  Injectable 1 milliGRAM(s) IntraMuscular once  heparin   Injectable 5000 Unit(s) SubCutaneous every 12 hours  imipramine 50 milliGRAM(s) Oral daily  insulin glargine Injectable (LANTUS) 12 Unit(s) SubCutaneous at bedtime  insulin lispro (ADMELOG) corrective regimen sliding scale   SubCutaneous three times a day before meals  insulin lispro (ADMELOG) corrective regimen sliding scale   SubCutaneous at bedtime  metoprolol tartrate 100 milliGRAM(s) Oral every 12 hours  pantoprazole    Tablet 40 milliGRAM(s) Oral before breakfast  povidone iodine 10% Solution 1 Application(s) Topical daily    MEDICATIONS  (PRN):  acetaminophen     Tablet .. 650 milliGRAM(s) Oral every 6 hours PRN Temp greater or equal to 38C (100.4F), Mild Pain (1 - 3)  aluminum hydroxide/magnesium hydroxide/simethicone Suspension 30 milliLiter(s) Oral every 4 hours PRN Dyspepsia  dextrose Oral Gel 15 Gram(s) Oral once PRN Blood Glucose LESS THAN 70 milliGRAM(s)/deciliter  melatonin 3 milliGRAM(s) Oral at bedtime PRN Insomnia  ondansetron Injectable 4 milliGRAM(s) IV Push every 8 hours PRN Nausea and/or Vomiting      Social History:  Tobacco: No  EtOH: No  recreational drug use: No  Lives with:   Ambulates: With walker  ADLs: needs assistance   Occupation: retired   Vaccinations: COVID Pfizer x2 (16 Jun 2022 01:19)      FAMILY HISTORY:  No pertinent family history in first degree relatives        Vital Signs Last 24 Hrs  T(C): 36.4 (17 Jun 2022 04:38), Max: 36.8 (16 Jun 2022 18:34)  T(F): 97.5 (17 Jun 2022 04:38), Max: 98.2 (16 Jun 2022 18:34)  HR: 68 (17 Jun 2022 04:38) (66 - 77)  BP: 166/91 (17 Jun 2022 04:38) (122/58 - 166/91)  BP(mean): --  RR: 18 (17 Jun 2022 04:38) (16 - 18)  SpO2: 93% (17 Jun 2022 04:38) (93% - 96%)    PHYSICAL EXAM:  Vascular: DP & PT NON-palpable bilaterally, Capillary refill delayed by 5 secs, Digital hair absent bilaterally  Neurological: Light touch sensation diminished bilaterally  Musculoskeletal: 5/5 strength in all quadrants bilaterally, AJ & STJ ROM intact  Dermatological: 0.5 cm x 0.5 cm medial malleolus ulceration noted to the left ankle, granular wound bed, probe to bone, no periwound erythema, no fluctuance, no malodor, no proximal streaking at this time. Right hallux gangrene noted with signs of demarcation to the PIPJ. Mild periwound erythema, no fluctuance, no malodor, no proximal streaking at this time.    CBC Full  -  ( 17 Jun 2022 07:52 )  WBC Count : 12.79 K/uL  RBC Count : 4.01 M/uL  Hemoglobin : 11.6 g/dL  Hematocrit : 37.3 %  Platelet Count - Automated : 244 K/uL  Mean Cell Volume : 93.0 fl  Mean Cell Hemoglobin : 28.9 pg  Mean Cell Hemoglobin Concentration : 31.1 gm/dL  Auto Neutrophil # : 9.69 K/uL  Auto Lymphocyte # : 1.11 K/uL  Auto Monocyte # : 1.61 K/uL  Auto Eosinophil # : 0.18 K/uL  Auto Basophil # : 0.02 K/uL  Auto Neutrophil % : 75.7 %  Auto Lymphocyte % : 8.7 %  Auto Monocyte % : 12.6 %  Auto Eosinophil % : 1.4 %  Auto Basophil % : 0.2 %      ----------CHEM PANEL----------    PT/INR - ( 15 Justin 2022 22:45 )   PT: 13.4 sec;   INR: 1.14 ratio         PTT - ( 15 Justin 2022 22:45 )  PTT:27.3 sec      Culture - Urine (collected 16 Jun 2022 03:40)  Source: Clean Catch Clean Catch (Midstream)  Final Report (17 Jun 2022 07:32):    <10,000 CFU/mL Normal Urogenital Shantel    Culture - Blood (collected 16 Jun 2022 03:38)  Source: .Blood Blood-Peripheral  Preliminary Report (17 Jun 2022 04:01):    No growth to date.    Culture - Blood (collected 16 Jun 2022 03:38)  Source: .Blood Blood-Peripheral  Preliminary Report (17 Jun 2022 04:00):    No growth to date.        Imaging: ----------

## 2022-06-17 NOTE — PROGRESS NOTE ADULT - SUBJECTIVE AND OBJECTIVE BOX
Patient is a 71y Female whom presented to the hospital with esrd on hd     PAST MEDICAL & SURGICAL HISTORY:  Diabetes mellitus II      HTN (hypertension)      h/o Anxiety attack      Depression      h/o Myocardial infarct       CAD (coronary artery disease)      h/o Hepatitis A 1969  currently resolved, no symptoms      PAD (peripheral artery disease)      Murmur, cardiac      h/o Smoking  quitted 3/2012      CRF (chronic renal failure), unspecified stage      Dialysis patient      Anemia secondary to renal failure      HTN (hypertension)      coronary stent       s/p Ovarian cyst removal      s/p surgical removal of benign Skin lesion epigastric area          MEDICATIONS  (STANDING):  amLODIPine   Tablet 5 milliGRAM(s) Oral daily  aspirin enteric coated 81 milliGRAM(s) Oral daily  atorvastatin 40 milliGRAM(s) Oral at bedtime  calcium acetate 667 milliGRAM(s) Oral three times a day with meals  cefTRIAXone   IVPB 1000 milliGRAM(s) IV Intermittent every 24 hours  cloNIDine 0.1 milliGRAM(s) Oral two times a day  clopidogrel Tablet 75 milliGRAM(s) Oral daily  dextrose 5%. 1000 milliLiter(s) (100 mL/Hr) IV Continuous <Continuous>  dextrose 5%. 1000 milliLiter(s) (50 mL/Hr) IV Continuous <Continuous>  dextrose 50% Injectable 25 Gram(s) IV Push once  dextrose 50% Injectable 12.5 Gram(s) IV Push once  dextrose 50% Injectable 25 Gram(s) IV Push once  diltiazem    Tablet 60 milliGRAM(s) Oral every 8 hours  glucagon  Injectable 1 milliGRAM(s) IntraMuscular once  heparin   Injectable 5000 Unit(s) SubCutaneous every 12 hours  imipramine 50 milliGRAM(s) Oral daily  insulin glargine Injectable (LANTUS) 12 Unit(s) SubCutaneous at bedtime  insulin lispro (ADMELOG) corrective regimen sliding scale   SubCutaneous three times a day before meals  insulin lispro (ADMELOG) corrective regimen sliding scale   SubCutaneous at bedtime  metoprolol tartrate 100 milliGRAM(s) Oral every 12 hours  pantoprazole    Tablet 40 milliGRAM(s) Oral before breakfast  povidone iodine 10% Solution 1 Application(s) Topical daily      Allergies    latex (Unknown)  No Known Drug Allergies    Intolerances        SOCIAL HISTORY:  Denies ETOh,Smoking,     FAMILY HISTORY:  No pertinent family history in first degree relatives        REVIEW OF SYSTEMS:    CONSTITUTIONAL: No weakness, fevers or chills  RESPIRATORY: No cough, wheezing, hemoptysis; No shortness of breath  CARDIOVASCULAR: No chest pain or palpitations  GASTROINTESTINAL: No abdominal or epigastric pain. No nausea, vomiting,     No diarrhea or constipation. No melena   SKIN: dry                            11.6   12.79 )-----------( 244      ( 2022 07:52 )             37.3       CBC Full  -  ( 2022 07:52 )  WBC Count : 12.79 K/uL  RBC Count : 4.01 M/uL  Hemoglobin : 11.6 g/dL  Hematocrit : 37.3 %  Platelet Count - Automated : 244 K/uL  Mean Cell Volume : 93.0 fl  Mean Cell Hemoglobin : 28.9 pg  Mean Cell Hemoglobin Concentration : 31.1 gm/dL  Auto Neutrophil # : 9.69 K/uL  Auto Lymphocyte # : 1.11 K/uL  Auto Monocyte # : 1.61 K/uL  Auto Eosinophil # : 0.18 K/uL  Auto Basophil # : 0.02 K/uL  Auto Neutrophil % : 75.7 %  Auto Lymphocyte % : 8.7 %  Auto Monocyte % : 12.6 %  Auto Eosinophil % : 1.4 %  Auto Basophil % : 0.2 %          135  |  97  |  26<H>  ----------------------------<  168<H>  4.2   |  26  |  3.60<H>    Ca    8.7      2022 07:52    TPro  6.6  /  Alb  2.8<L>  /  TBili  0.5  /  DBili  x   /  AST  31  /  ALT  26  /  AlkPhos  113        CAPILLARY BLOOD GLUCOSE      POCT Blood Glucose.: 170 mg/dL (2022 17:00)  POCT Blood Glucose.: 241 mg/dL (2022 11:53)  POCT Blood Glucose.: 166 mg/dL (2022 08:08)  POCT Blood Glucose.: 192 mg/dL (2022 21:17)      Vital Signs Last 24 Hrs  T(C): 36.7 (2022 12:11), Max: 36.7 (2022 21:08)  T(F): 98.1 (2022 12:11), Max: 98.1 (2022 12:11)  HR: 71 (2022 17:44) (66 - 71)  BP: 116/66 (2022 17:44) (116/66 - 166/91)  BP(mean): --  RR: 18 (2022 04:38) (16 - 18)  SpO2: 95% (2022 12:11) (93% - 95%)    Urinalysis Basic - ( 15 Justin 2022 23:20 )    Color: Yellow / Appearance: Clear / S.010 / pH: x  Gluc: x / Ketone: Small  / Bili: Negative / Urobili: 1   Blood: x / Protein: Negative / Nitrite: Negative   Leuk Esterase: Negative / RBC: x / WBC x   Sq Epi: x / Non Sq Epi: x / Bacteria: x        PT/INR - ( 15 Justin 2022 22:45 )   PT: 13.4 sec;   INR: 1.14 ratio         PTT - ( 15 Justin 2022 22:45 )  PTT:27.3 sec                  PHYSICAL EXAM:    Constitutional: NAD  HEENT: conjunctive   clear   Neck:  No JVD  Respiratory: CTAB  Cardiovascular: S1 and S2  Gastrointestinal: BS+, soft, NT/ND  Extremities: No peripheral edema  , pos toe bandage   Neurological:  no focal deficits  Psychiatric: Normal mood, normal affect  : No Renteria  Skin: No rashes  Access: pos fistula

## 2022-06-17 NOTE — PROGRESS NOTE ADULT - ASSESSMENT
Patient is a 71 year old female with PMH of A.fib rate controlled not on AC for hx of multiple falls, T2DM, HTN, HLD, ESRD on HD, CHF, s/p CABG brought in by EMS for generalized weakness at home. Patient states that she was doing well when in the afternoon she tried to get up from her couch and felt weak in the LE and fell back in her couch. Denies any hx of head trauma or loss of consciousness. Denies any weakness or numbness. Denies any chest pain, SOB or palpitations. No fever, cough or chills. No hx of recent travel or sick contacts. At the time of EMS arrival patient was found to have POCBS of 50. EMS gave dextrose and on arrival to the ED patient blood sugar was found to be in 30's with hypothermia of 94.9.    ED course:   Vitals:   BP: 176/85  HR:76  Temp:94.2  RR:18, O2:96% on RA   Significant Labs:   CBC: WBC:16.82 Hb:13.2 , Platlets: 260, Neutro:89   CMP: Lytes: WNL, BUN/Creatinine:48/4.8, Glucose:134 , Alkaline Phos:128, AST, 41, eGFR: 9, HsTrop: 275.9, ProBNP: 830422, Lactate: 2  UA; negative  CXR: Heart and lung appear normal (self read)  CT BRAIN: No acute intracranial bleeding. Central volume loss, chronic microvascular ischemic changes, and chronic bilateral lacunar infarctions.  CT CERVICAL SPINE: No fracture. Grade 1 C4-C5 anterolisthesis. C6-C7 disc degeneration. Bilateral pleural effusions and interlobular septal thickening due to   interstitial edema.  EKG: NSR, left axis deviation, HR 71,   QT/QTc: 426/462  Patient received: Ceftriaxone 1 g x1, Dextrose 5% + NS 1L x1, Dextrose 50% IV X1, heating blanket, 2L NC   (16 Jun 2022 01:19)      esrd on hd tues thurs sat     ANEMIA PLAN:  Anemia of chronic disease:  We will continue epo  aiming for a HCT of 32-36 %.   We will monitor Iron stores, B12 and RBC folate .    BP monitoring,continue current antihypertensive meds, low salt diet    f/u  blood and urine cx,serial lactate levels,monitor vitals closley,ivfs hydration,monitor urine output and renal profile,iv abx

## 2022-06-17 NOTE — PROGRESS NOTE ADULT - SUBJECTIVE AND OBJECTIVE BOX
Patient is a 71y old  Female who presents with a chief complaint of SIRS (17 Jun 2022 07:23)  Vascular surgery consulted for R hallux gangrene and diminished pulses  No acute events overnight. Deneis fevers, chills, abd pain, SOB, CP or lower extremity pain  Confused this am and acknowledges she doesn't know where or why she is here    MEDICATIONS  (STANDING):  amLODIPine   Tablet 5 milliGRAM(s) Oral daily  aspirin enteric coated 81 milliGRAM(s) Oral daily  atorvastatin 40 milliGRAM(s) Oral at bedtime  calcium acetate 667 milliGRAM(s) Oral three times a day with meals  cefTRIAXone   IVPB 1000 milliGRAM(s) IV Intermittent every 24 hours  cloNIDine 0.1 milliGRAM(s) Oral two times a day  clopidogrel Tablet 75 milliGRAM(s) Oral daily  dextrose 5%. 1000 milliLiter(s) (50 mL/Hr) IV Continuous <Continuous>  dextrose 5%. 1000 milliLiter(s) (100 mL/Hr) IV Continuous <Continuous>  dextrose 50% Injectable 25 Gram(s) IV Push once  dextrose 50% Injectable 12.5 Gram(s) IV Push once  dextrose 50% Injectable 25 Gram(s) IV Push once  diltiazem    Tablet 60 milliGRAM(s) Oral every 8 hours  glucagon  Injectable 1 milliGRAM(s) IntraMuscular once  heparin   Injectable 5000 Unit(s) SubCutaneous every 12 hours  imipramine 50 milliGRAM(s) Oral daily  insulin glargine Injectable (LANTUS) 12 Unit(s) SubCutaneous at bedtime  insulin lispro (ADMELOG) corrective regimen sliding scale   SubCutaneous three times a day before meals  insulin lispro (ADMELOG) corrective regimen sliding scale   SubCutaneous at bedtime  metoprolol tartrate 100 milliGRAM(s) Oral every 12 hours  pantoprazole    Tablet 40 milliGRAM(s) Oral before breakfast  povidone iodine 10% Solution 1 Application(s) Topical daily    MEDICATIONS  (PRN):  acetaminophen     Tablet .. 650 milliGRAM(s) Oral every 6 hours PRN Temp greater or equal to 38C (100.4F), Mild Pain (1 - 3)  aluminum hydroxide/magnesium hydroxide/simethicone Suspension 30 milliLiter(s) Oral every 4 hours PRN Dyspepsia  dextrose Oral Gel 15 Gram(s) Oral once PRN Blood Glucose LESS THAN 70 milliGRAM(s)/deciliter  melatonin 3 milliGRAM(s) Oral at bedtime PRN Insomnia  ondansetron Injectable 4 milliGRAM(s) IV Push every 8 hours PRN Nausea and/or Vomiting    Allergies  latex (Unknown)  No Known Drug Allergies    Vital Signs Last 24 Hrs  T(C): 36.4 (17 Jun 2022 04:38), Max: 36.8 (16 Jun 2022 18:34)  T(F): 97.5 (17 Jun 2022 04:38), Max: 98.2 (16 Jun 2022 18:34)  HR: 68 (17 Jun 2022 04:38) (66 - 77)  BP: 166/91 (17 Jun 2022 04:38) (122/58 - 166/91)  BP(mean): --  RR: 18 (17 Jun 2022 04:38) (16 - 18)  SpO2: 93% (17 Jun 2022 04:38) (93% - 96%)  I&O's Detail    16 Jun 2022 07:01  -  17 Jun 2022 07:00  --------------------------------------------------------  IN:  Total IN: 0 mL    OUT:    Other (mL): 2000 mL  Total OUT: 2000 mL    Total NET: -2000 mL      Physical Exam:  General: NAD, resting comfortably in bed  Neuro: Oriented to self only, speech fluent, MUHAMMAD X 4=  Pulmonary: Nonlabored breathing, no respiratory distress, diminished at base R>L  Cardiovascular: Normal S1, S2 with soft syst murmur  Abdominal: soft, NT/ND  Extremities: R great toe distal tip dry gangrene with macerated base and periwound erythema, +malodor, no crepitus. 2nd toe distal with dry gangrene. L inner maleolar with 2x2 cm circular wound with granulated base and hyperkeritotic edges. R groin incision well healed; L groin fem-BK pop incision well healed. R radiocephalic AVF + bruit, +thrill  Pulses:   Right:                                                                          Left:  FEM [ ]2+ [ ]1+ [ ]doppler                                           FEM [x ]2+ [ ]1+ [ ]doppler    POP [ ]2+ [ ]1+ [x ]doppler                                          POP [x ]2+ [ ]1+ [ ]doppler    DP [ ]2+ [ ]1+ [x ]doppler                                            DP [x ]2+ [ ]1+ [ ]doppler  PT[ ]2+ [ ]1+ [ x]doppler                                             PT [ x]2+ [ ]1+ [ ]doppler      LABS:                        11.6   12.79 )-----------( 244      ( 17 Jun 2022 07:52 )             37.3     06-17    135  |  97  |  26<H>  ----------------------------<  168<H>  4.2   |  26  |  3.60<H>    Ca    8.7      17 Jun 2022 07:52    TPro  6.6  /  Alb  2.8<L>  /  TBili  0.5  /  DBili  x   /  AST  31  /  ALT  26  /  AlkPhos  113  06-17    PT/INR - ( 15 Justin 2022 22:45 )   PT: 13.4 sec;   INR: 1.14 ratio         PTT - ( 15 Justin 2022 22:45 )  PTT:27.3 sec  CAPILLARY BLOOD GLUCOSE  POCT Blood Glucose.: 166 mg/dL (17 Jun 2022 08:08)  POCT Blood Glucose.: 192 mg/dL (16 Jun 2022 21:17)  POCT Blood Glucose.: 109 mg/dL (16 Jun 2022 16:29)  POCT Blood Glucose.: 205 mg/dL (16 Jun 2022 11:55)    Radiology and Additional Studies:    < from: TTE Echo Complete w/o Contrast w/ Doppler (04.08.22 @ 10:58) >  ACC: [id number] EXAM:  ECHO TTE WO CON COMP W DOPP                          PROCEDURE DATE:  04/08/2022          INTERPRETATION:  Ordering Physician:  AUGIE ALBRIGHT [id number]    Indication: LV function    Quality of study: Good  A complete echocardiographic study was performed utilizing standard   protocol including spectral and color Doppler in all echocardiographic   windows.    Height: 175.3 cm  Weight: 60.4 KG  BSA: 1.74 sq m  Blood Pressure: 162/68   MEASUREMENTS  IVS: 1.1cm  PWT: 1.1cm  LA: 3.1cm  AO: 2.6cm  AV Cusp Separation: cm  LVIDd: 4.9cm  LVIDs: 3.2cm  LVOT: 1.7cm    LVEF: 45%  RVSP: mmHg    FINDINGS  Left Ventricle: Left ventricle was of normal size, moderate LV systolic   dysfunction EF 45%  Aortic Valve: Aortic sclerosis  Mitral Valve: Moderate mitral regurgitation  Tricuspid Valve: Trace tricuspid regurgitation  Pulmonic Valve: Normal  Left Atrium: Normal  Right Ventricle: Normal  Right Atrium: Normal  Diastolic Function: Normal  Pericardium/Pleura: Normal      IMPRESSION:  Left ventricle was of normal size, moderate LV systolic dysfunction EF 45%  Aortic sclerosis  Moderate mitral regurgitation  Trace tricuspid regurgitation    < end of copied text >

## 2022-06-17 NOTE — PROGRESS NOTE ADULT - SUBJECTIVE AND OBJECTIVE BOX
[facility]  INFECTIOUS DISEASES   [address] [place] [address]  Tel: [phone]     Fax: [phone]  ======================================================  MD Zita Alejandra Kaushal, MD Cho, Michelle, MD   ======================================================    [id number]  SHILO KOHLER     Follow up: R foot wound/gangrene     No new changes, no pain in foot, no fever.      PAST MEDICAL & SURGICAL HISTORY:  Diabetes mellitus II  HTN (hypertension)  h/o Anxiety attack  Depression  h/o Myocardial infarct 2007  CAD (coronary artery disease)  h/o Hepatitis A 1969  currently resolved, no symptoms  PAD (peripheral artery disease)  Murmur, cardiac  h/o Smoking  quitted 3/2012  CRF (chronic renal failure), unspecified stage  Dialysis patient  Anemia secondary to renal failure  HTN (hypertension)  coronary stent 2007  s/p Ovarian cyst removal  s/p surgical removal of benign Skin lesion epigastric area    Social Hx: no current smoking, ETOH or drugs     FAMILY HISTORY:  No pertinent family history in first degree relatives    Allergies  latex (Unknown)  No Known Drug Allergies    Antibiotics:  zosyn     REVIEW OF SYSTEMS:  CONSTITUTIONAL:  No Fever or chills  HEENT:  No diplopia or blurred vision.  No sore throat or runny nose.  CARDIOVASCULAR:  No chest pain or SOB.  RESPIRATORY:  No cough, shortness of breath, PND or orthopnea.  GASTROINTESTINAL:  No nausea, vomiting or diarrhea.  GENITOURINARY:  No dysuria, frequency or urgency. No Blood in urine  MUSCULOSKELETAL:  no joint aches, no muscle pain  SKIN:  R foot wound   NEUROLOGIC:  No paresthesias, fasciculations, seizures or weakness.  PSYCHIATRIC:  No disorder of thought or mood.  ENDOCRINE:  No heat or cold intolerance, polyuria or polydipsia.  HEMATOLOGICAL:  No easy bruising or bleeding.     Physical Exam:  Vital Signs Last 24 Hrs  T(C): 36.7 (17 Jun 2022 12:11), Max: 36.8 (16 Jun 2022 18:34)  T(F): 98.1 (17 Jun 2022 12:11), Max: 98.2 (16 Jun 2022 18:34)  HR: 66 (17 Jun 2022 12:11) (66 - 76)  BP: 134/64 (17 Jun 2022 12:11) (122/58 - 166/91)  BP(mean): --  RR: 18 (17 Jun 2022 04:38) (16 - 18)  SpO2: 95% (17 Jun 2022 12:11) (93% - 96%)  GEN: NAD  HEENT: normocephalic and atraumatic. EOMI. PERRL.    NECK: Supple.  No lymphadenopathy   LUNGS: Clear to auscultation.  HEART: Irregular rate and rhythm   ABDOMEN: Soft, nontender, and nondistended.  Positive bowel sounds.    : No CVA tenderness  EXTREMITIES: R hallux with an ulcer, looks gangrenous with superimposed infection   has some bloody discharge. R heel ulcer very superficial,   left medial malleolus small ulcer minimal serous discharge   NEUROLOGIC: grossly intact.  PSYCHIATRIC: Appropriate affect .  SKIN: No rash     Labs:                        11.6   12.79 )-----------( 244      ( 17 Jun 2022 07:52 )             37.3     06-17    135  |  97  |  26<H>  ----------------------------<  168<H>  4.2   |  26  |  3.60<H>    Ca    8.7      17 Jun 2022 07:52    TPro  6.6  /  Alb  2.8<L>  /  TBili  0.5  /  DBili  x   /  AST  31  /  ALT  26  /  AlkPhos  113  06-17    Culture - Urine (collected 06-16-22 @ 03:40)  Source: Clean Catch Clean Catch (Midstream)  Final Report (06-17-22 @ 07:32):    <10,000 CFU/mL Normal Urogenital Shantel    Culture - Blood (collected 06-16-22 @ 03:38)  Source: .Blood Blood-Peripheral    Culture - Blood (collected 06-16-22 @ 03:38)  Source: .Blood Blood-Peripheral    WBC Count: 12.79 K/uL (06-17-22 @ 07:52)  WBC Count: 16.09 K/uL (06-16-22 @ 11:57)  WBC Count: 16.82 K/uL (06-15-22 @ 21:13)    Creatinine, Serum: 3.60 mg/dL (06-17-22 @ 07:52)  Creatinine, Serum: 5.40 mg/dL (06-16-22 @ 11:57)  Creatinine, Serum: 4.80 mg/dL (06-15-22 @ 21:13)    C-Reactive Protein, Serum: 19 mg/L (06-17-22 @ 10:38)    Sedimentation Rate, Erythrocyte: 13 mm/hr (06-17-22 @ 07:52)    SARS-CoV-2: NotDetec (06-15-22 @ 22:44)    All imaging and other data have been reviewed.  < from: CT Chest No Cont (06.16.22 @ 08:16) >  IMPRESSION:  Small layering bilateral pleural effusions decreased from 4/6/2022.  7 mm groundglass nodule within left lower lobe (series 2 image 44).   Recommend follow-up chest CT in 12 months to determine stability.    Assessment and Plan:   70 yo woman with PMH of HTN, DM2, CAD, CHF, A.fib, multiple falls and ESRD on HD was admitted on 6/16, came to ED with generalized weakness.   Her hypoglycemia and hypothermia on admission could be related to sepsis/SIRS source unclear at this time, could be foot infection? Left hallux looks like  a dry gangrene with superimposed infection.   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 She had Tissue cultures from 3/30/22 grew   klebsiella oxytoca/raoutella oxytoca, E. coli, MSSA so Cefazolin was given after HD to complete the course of treatment.   ESR 13 and CRP 19    Recommendations:  - Blood culture x 2 NGTD  - leukocytosis improving   - Vascular surgery follow up noted, for angiogram and possibly bypass on 6/21   - Podiatry follow up noted for possible amputation on 6/21 if patient agrees(refused in the past)  - Will continue ceftriaxone based on old culture, not angry looking, no MRSA    - MRSA PCR is ordered     Will follow.  Discussed with the primary team.     Geovany Long MD  Division of Infectious Diseases   Please call ID service at [phone] with any question.      55 minutes spent on total encounter assessing patient, examination, chart reivew, counseling and coordinating care by the attending physician/nurse/care manager.

## 2022-06-18 LAB
ANION GAP SERPL CALC-SCNC: 9 MMOL/L — SIGNIFICANT CHANGE UP (ref 5–17)
BASOPHILS # BLD AUTO: 0.03 K/UL — SIGNIFICANT CHANGE UP (ref 0–0.2)
BASOPHILS NFR BLD AUTO: 0.3 % — SIGNIFICANT CHANGE UP (ref 0–2)
BUN SERPL-MCNC: 39 MG/DL — HIGH (ref 7–23)
CALCIUM SERPL-MCNC: 9 MG/DL — SIGNIFICANT CHANGE UP (ref 8.5–10.1)
CHLORIDE SERPL-SCNC: 98 MMOL/L — SIGNIFICANT CHANGE UP (ref 96–108)
CO2 SERPL-SCNC: 29 MMOL/L — SIGNIFICANT CHANGE UP (ref 22–31)
CREAT SERPL-MCNC: 4.9 MG/DL — HIGH (ref 0.5–1.3)
EGFR: 9 ML/MIN/1.73M2 — LOW
EOSINOPHIL # BLD AUTO: 0.21 K/UL — SIGNIFICANT CHANGE UP (ref 0–0.5)
EOSINOPHIL NFR BLD AUTO: 2 % — SIGNIFICANT CHANGE UP (ref 0–6)
GLUCOSE SERPL-MCNC: 81 MG/DL — SIGNIFICANT CHANGE UP (ref 70–99)
HCT VFR BLD CALC: 35.3 % — SIGNIFICANT CHANGE UP (ref 34.5–45)
HGB BLD-MCNC: 11.2 G/DL — LOW (ref 11.5–15.5)
IMM GRANULOCYTES NFR BLD AUTO: 1.2 % — SIGNIFICANT CHANGE UP (ref 0–1.5)
LYMPHOCYTES # BLD AUTO: 1.47 K/UL — SIGNIFICANT CHANGE UP (ref 1–3.3)
LYMPHOCYTES # BLD AUTO: 13.7 % — SIGNIFICANT CHANGE UP (ref 13–44)
MCHC RBC-ENTMCNC: 29.1 PG — SIGNIFICANT CHANGE UP (ref 27–34)
MCHC RBC-ENTMCNC: 31.7 GM/DL — LOW (ref 32–36)
MCV RBC AUTO: 91.7 FL — SIGNIFICANT CHANGE UP (ref 80–100)
MONOCYTES # BLD AUTO: 1.48 K/UL — HIGH (ref 0–0.9)
MONOCYTES NFR BLD AUTO: 13.8 % — SIGNIFICANT CHANGE UP (ref 2–14)
NEUTROPHILS # BLD AUTO: 7.41 K/UL — HIGH (ref 1.8–7.4)
NEUTROPHILS NFR BLD AUTO: 69 % — SIGNIFICANT CHANGE UP (ref 43–77)
NRBC # BLD: 0 /100 WBCS — SIGNIFICANT CHANGE UP (ref 0–0)
PLATELET # BLD AUTO: 251 K/UL — SIGNIFICANT CHANGE UP (ref 150–400)
POTASSIUM SERPL-MCNC: 4.1 MMOL/L — SIGNIFICANT CHANGE UP (ref 3.5–5.3)
POTASSIUM SERPL-SCNC: 4.1 MMOL/L — SIGNIFICANT CHANGE UP (ref 3.5–5.3)
RBC # BLD: 3.85 M/UL — SIGNIFICANT CHANGE UP (ref 3.8–5.2)
RBC # FLD: 17.5 % — HIGH (ref 10.3–14.5)
SODIUM SERPL-SCNC: 136 MMOL/L — SIGNIFICANT CHANGE UP (ref 135–145)
WBC # BLD: 10.73 K/UL — HIGH (ref 3.8–10.5)
WBC # FLD AUTO: 10.73 K/UL — HIGH (ref 3.8–10.5)

## 2022-06-18 PROCEDURE — 99232 SBSQ HOSP IP/OBS MODERATE 35: CPT

## 2022-06-18 RX ORDER — DIPHENHYDRAMINE HCL 50 MG
25 CAPSULE ORAL
Refills: 0 | Status: DISCONTINUED | OUTPATIENT
Start: 2022-06-18 | End: 2022-06-21

## 2022-06-18 RX ORDER — INSULIN GLARGINE 100 [IU]/ML
8 INJECTION, SOLUTION SUBCUTANEOUS AT BEDTIME
Refills: 0 | Status: DISCONTINUED | OUTPATIENT
Start: 2022-06-18 | End: 2022-06-21

## 2022-06-18 RX ADMIN — Medication 2: at 11:31

## 2022-06-18 RX ADMIN — Medication 60 MILLIGRAM(S): at 13:46

## 2022-06-18 RX ADMIN — CLOPIDOGREL BISULFATE 75 MILLIGRAM(S): 75 TABLET, FILM COATED ORAL at 11:31

## 2022-06-18 RX ADMIN — HEPARIN SODIUM 5000 UNIT(S): 5000 INJECTION INTRAVENOUS; SUBCUTANEOUS at 06:07

## 2022-06-18 RX ADMIN — CEFTRIAXONE 100 MILLIGRAM(S): 500 INJECTION, POWDER, FOR SOLUTION INTRAMUSCULAR; INTRAVENOUS at 13:46

## 2022-06-18 RX ADMIN — Medication 1 APPLICATION(S): at 11:32

## 2022-06-18 RX ADMIN — Medication 667 MILLIGRAM(S): at 11:31

## 2022-06-18 RX ADMIN — Medication 667 MILLIGRAM(S): at 08:08

## 2022-06-18 RX ADMIN — Medication 0.1 MILLIGRAM(S): at 19:04

## 2022-06-18 RX ADMIN — HEPARIN SODIUM 5000 UNIT(S): 5000 INJECTION INTRAVENOUS; SUBCUTANEOUS at 19:04

## 2022-06-18 RX ADMIN — PANTOPRAZOLE SODIUM 40 MILLIGRAM(S): 20 TABLET, DELAYED RELEASE ORAL at 06:15

## 2022-06-18 RX ADMIN — Medication 81 MILLIGRAM(S): at 11:31

## 2022-06-18 RX ADMIN — Medication 50 MILLIGRAM(S): at 11:31

## 2022-06-18 RX ADMIN — AMLODIPINE BESYLATE 5 MILLIGRAM(S): 2.5 TABLET ORAL at 06:08

## 2022-06-18 RX ADMIN — Medication 100 MILLIGRAM(S): at 06:08

## 2022-06-18 RX ADMIN — Medication 100 MILLIGRAM(S): at 19:04

## 2022-06-18 RX ADMIN — Medication 667 MILLIGRAM(S): at 19:04

## 2022-06-18 RX ADMIN — ATORVASTATIN CALCIUM 40 MILLIGRAM(S): 80 TABLET, FILM COATED ORAL at 22:22

## 2022-06-18 RX ADMIN — Medication 60 MILLIGRAM(S): at 22:22

## 2022-06-18 RX ADMIN — Medication 60 MILLIGRAM(S): at 06:07

## 2022-06-18 RX ADMIN — INSULIN GLARGINE 8 UNIT(S): 100 INJECTION, SOLUTION SUBCUTANEOUS at 22:22

## 2022-06-18 RX ADMIN — Medication 0.1 MILLIGRAM(S): at 06:08

## 2022-06-18 RX ADMIN — Medication 25 MILLIGRAM(S): at 17:26

## 2022-06-18 NOTE — PROGRESS NOTE ADULT - SUBJECTIVE AND OBJECTIVE BOX
Patient is a 71y Female with a known history of :  SIRS (systemic inflammatory response syndrome) [R65.10]    Hypoglycemia [E16.2]    Pleural effusion [J90]    CHF, acute [I50.9]    Chronic CHF [I50.9]    Elevated troponin [R77.8]    Atrial fibrillation [I48.91]    Benign essential HTN [I10]    HLD (hyperlipidemia) [E78.5]    Depression, major [F32.9]    Need for prophylactic measure [Z29.9]    ESRD on hemodialysis [N18.6]    T2DM (type 2 diabetes mellitus) [E11.9]    HFrEF (heart failure with reduced ejection fraction) [I50.20]    Gangrene of toe of right foot [I96]      HPI:  Patient is a 71 year old female with PMH of A.fib rate controlled not on AC for hx of multiple falls, T2DM, HTN, HLD, ESRD on HD, CHF, s/p CABG brought in by EMS for generalized weakness at home. Patient states that she was doing well when in the afternoon she tried to get up from her couch and felt weak in the LE and fell back in her couch. Denies any hx of head trauma or loss of consciousness. Denies any weakness or numbness. Denies any chest pain, SOB or palpitations. No fever, cough or chills. No hx of recent travel or sick contacts. At the time of EMS arrival patient was found to have POCBS of 50. EMS gave dextrose and on arrival to the ED patient blood sugar was found to be in 30's with hypothermia of 94.9.    ED course:   Vitals:   BP: 176/85  HR:76  Temp:94.2  RR:18, O2:96% on RA   Significant Labs:   CBC: WBC:16.82 Hb:13.2 , Platlets: 260, Neutro:89   CMP: Lytes: WNL, BUN/Creatinine:48/4.8, Glucose:134 , Alkaline Phos:128, AST, 41, eGFR: 9, HsTrop: 275.9, ProBNP: [id number], Lactate: 2  UA; negative  CXR: Heart and lung appear normal (self read)  CT BRAIN: No acute intracranial bleeding. Central volume loss, chronic microvascular ischemic changes, and chronic bilateral lacunar infarctions.  CT CERVICAL SPINE: No fracture. Grade 1 C4-C5 anterolisthesis. C6-C7 disc degeneration. Bilateral pleural effusions and interlobular septal thickening due to   interstitial edema.  EKG: NSR, left axis deviation, HR 71,   QT/QTc: 426/462  Patient received: Ceftriaxone 1 g x1, Dextrose 5% + NS 1L x1, Dextrose 50% IV X1, heating blanket, 2L NC   (16 Jun 2022 01:19)      REVIEW OF SYSTEMS:    CONSTITUTIONAL: No fever, weight loss, or fatigue  EYES: No eye pain, visual disturbances, or discharge  ENMT:  No difficulty hearing, tinnitus, vertigo; No sinus or throat pain  NECK: No pain or stiffness  BREASTS: No pain, masses, or nipple discharge  RESPIRATORY: No cough, wheezing, chills or hemoptysis; No shortness of breath  CARDIOVASCULAR: No chest pain, palpitations, dizziness, or leg swelling  GASTROINTESTINAL: No abdominal or epigastric pain. No nausea, vomiting, or hematemesis; No diarrhea or constipation. No melena or hematochezia.  GENITOURINARY: No dysuria, frequency, hematuria, or incontinence  NEUROLOGICAL: No headaches, memory loss, loss of strength, numbness, or tremors  SKIN: No itching, burning, rashes, or lesions   LYMPH NODES: No enlarged glands  ENDOCRINE: No heat or cold intolerance; No hair loss  MUSCULOSKELETAL: No joint pain or swelling; No muscle, back, or extremity pain  PSYCHIATRIC: No depression, anxiety, mood swings, or difficulty sleeping  HEME/LYMPH: No easy bruising, or bleeding gums  ALLERGY AND IMMUNOLOGIC: No hives or eczema    MEDICATIONS  (STANDING):  amLODIPine   Tablet 5 milliGRAM(s) Oral daily  aspirin enteric coated 81 milliGRAM(s) Oral daily  atorvastatin 40 milliGRAM(s) Oral at bedtime  calcium acetate 667 milliGRAM(s) Oral three times a day with meals  cefTRIAXone   IVPB 1000 milliGRAM(s) IV Intermittent every 24 hours  cloNIDine 0.1 milliGRAM(s) Oral two times a day  clopidogrel Tablet 75 milliGRAM(s) Oral daily  dextrose 5%. 1000 milliLiter(s) (100 mL/Hr) IV Continuous <Continuous>  dextrose 5%. 1000 milliLiter(s) (50 mL/Hr) IV Continuous <Continuous>  dextrose 50% Injectable 25 Gram(s) IV Push once  dextrose 50% Injectable 12.5 Gram(s) IV Push once  dextrose 50% Injectable 25 Gram(s) IV Push once  diltiazem    Tablet 60 milliGRAM(s) Oral every 8 hours  glucagon  Injectable 1 milliGRAM(s) IntraMuscular once  heparin   Injectable 5000 Unit(s) SubCutaneous every 12 hours  imipramine 50 milliGRAM(s) Oral daily  insulin glargine Injectable (LANTUS) 8 Unit(s) SubCutaneous at bedtime  insulin lispro (ADMELOG) corrective regimen sliding scale   SubCutaneous three times a day before meals  insulin lispro (ADMELOG) corrective regimen sliding scale   SubCutaneous at bedtime  metoprolol tartrate 100 milliGRAM(s) Oral every 12 hours  pantoprazole    Tablet 40 milliGRAM(s) Oral before breakfast  povidone iodine 10% Solution 1 Application(s) Topical daily    MEDICATIONS  (PRN):  acetaminophen     Tablet .. 650 milliGRAM(s) Oral every 6 hours PRN Temp greater or equal to 38C (100.4F), Mild Pain (1 - 3)  aluminum hydroxide/magnesium hydroxide/simethicone Suspension 30 milliLiter(s) Oral every 4 hours PRN Dyspepsia  dextrose Oral Gel 15 Gram(s) Oral once PRN Blood Glucose LESS THAN 70 milliGRAM(s)/deciliter  melatonin 3 milliGRAM(s) Oral at bedtime PRN Insomnia  ondansetron Injectable 4 milliGRAM(s) IV Push every 8 hours PRN Nausea and/or Vomiting      ALLERGIES: latex (Unknown)  No Known Drug Allergies      FAMILY HISTORY:  No pertinent family history in first degree relatives        PHYSICAL EXAMINATION:  -----------------------------  T(C): 37.1 (06-18-22 @ 05:03), Max: 37.1 (06-18-22 @ 05:03)  HR: 66 (06-18-22 @ 05:03) (66 - 71)  BP: 147/69 (06-18-22 @ 05:03) (116/66 - 147/69)  RR: 18 (06-18-22 @ 05:03) (18 - 18)  SpO2: 95% (06-18-22 @ 05:03) (91% - 95%)  Wt(kg): --        VITALS  T(C): 37.1 (06-18-22 @ 05:03), Max: 37.1 (06-18-22 @ 05:03)  HR: 66 (06-18-22 @ 05:03) (66 - 71)  BP: 147/69 (06-18-22 @ 05:03) (116/66 - 147/69)  RR: 18 (06-18-22 @ 05:03) (18 - 18)  SpO2: 95% (06-18-22 @ 05:03) (91% - 95%)    Constitutional: well developed, normal appearance, well groomed, well nourished, no deformities and no acute distress.   Eyes: the conjunctiva exhibited no abnormalities and the eyelids demonstrated no xanthelasmas.   HEENT: normal oral mucosa, no oral pallor and no oral cyanosis.   Neck: normal jugular venous A waves present, normal jugular venous V waves present and no jugular venous wilson A waves.   Pulmonary: no respiratory distress, normal respiratory rhythm and effort, no accessory muscle use and lungs were clear to auscultation bilaterally.   Cardiovascular: heart rate and rhythm were normal, normal S1 and S2 and no murmur, gallop, rub, heave or thrill are present.   Abdomen: soft, non-tender, no hepato-splenomegaly and no abdominal mass palpated.   Musculoskeletal: the gait could not be assessed..   Extremities: no clubbing of the fingernails, no localized cyanosis, no petechial hemorrhages and no ischemic changes.   Skin: normal skin color and pigmentation, no rash, no venous stasis, no skin lesions, no skin ulcer and no xanthoma was observed.   Psychiatric: oriented to person, place, and time, the affect was normal, the mood was normal and not feeling anxious.     LABS:   --------  06-18    136  |  98  |  39<H>  ----------------------------<  81  4.1   |  29  |  4.90<H>    Ca    9.0      18 Jun 2022 07:54    TPro  6.6  /  Alb  2.8<L>  /  TBili  0.5  /  DBili  x   /  AST  31  /  ALT  26  /  AlkPhos  113  06-17                         11.2   10.73 )-----------( 251      ( 18 Jun 2022 07:54 )             35.3                 RADIOLOGY:  -----------------    ECG:     ECHO:

## 2022-06-18 NOTE — PROGRESS NOTE ADULT - PROBLEM SELECTOR PLAN 1
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  - Patient will need surgical intervention, amputation. Discussed with pt the etiology of her condition and recommendation of Right hallux amputation. Patient is agreeable to plan.   - Discussed risks, benefits, and potential complications with patient and family members regarding surgical intervention including sepsis, further loss of limb, and loss of life. All questions answered to satisfaction at this time.  - Plan for surgical right hallux amputation next week following vascular intervention.  - [facility] recs appreciated: scheduled for Ag[facility] 6/21  - Continue IV abx per ID recs  - Continue med management  - Please provide medical clearance for possible right hallux amputation next week    Podiatry to follow

## 2022-06-18 NOTE — PROGRESS NOTE ADULT - SUBJECTIVE AND OBJECTIVE BOX
CAPILLARY BLOOD GLUCOSE      POCT Blood Glucose.: 74 mg/dL (18 Jun 2022 07:45)  POCT Blood Glucose.: 146 mg/dL (17 Jun 2022 21:10)  POCT Blood Glucose.: 170 mg/dL (17 Jun 2022 17:00)  POCT Blood Glucose.: 241 mg/dL (17 Jun 2022 11:53)      Vital Signs Last 24 Hrs  T(C): 37.1 (18 Jun 2022 05:03), Max: 37.1 (18 Jun 2022 05:03)  T(F): 98.7 (18 Jun 2022 05:03), Max: 98.7 (18 Jun 2022 05:03)  HR: 66 (18 Jun 2022 05:03) (66 - 71)  BP: 147/69 (18 Jun 2022 05:03) (116/66 - 147/69)  BP(mean): --  RR: 18 (18 Jun 2022 05:03) (18 - 18)  SpO2: 95% (18 Jun 2022 05:03) (91% - 95%)    General: WN/WD NAD  Respiratory: CTA B/L  CV: RRR, S1S2, no murmurs, rubs or gallops  Abdominal: Soft, NT, ND +BS, Last BM  Extremities: No edema, + peripheral pulses     06-18    136  |  98  |  39<H>  ----------------------------<  81  4.1   |  29  |  4.90<H>    Ca    9.0      18 Jun 2022 07:54    TPro  6.6  /  Alb  2.8<L>  /  TBili  0.5  /  DBili  x   /  AST  31  /  ALT  26  /  AlkPhos  113  06-17      atorvastatin 40 milliGRAM(s) Oral at bedtime  dextrose 50% Injectable 25 Gram(s) IV Push once  dextrose 50% Injectable 12.5 Gram(s) IV Push once  dextrose 50% Injectable 25 Gram(s) IV Push once  dextrose Oral Gel 15 Gram(s) Oral once PRN  glucagon  Injectable 1 milliGRAM(s) IntraMuscular once  insulin glargine Injectable (LANTUS) 12 Unit(s) SubCutaneous at bedtime  insulin lispro (ADMELOG) corrective regimen sliding scale   SubCutaneous three times a day before meals  insulin lispro (ADMELOG) corrective regimen sliding scale   SubCutaneous at bedtime

## 2022-06-18 NOTE — PROGRESS NOTE ADULT - ASSESSMENT
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 Lantus dec to 10 units qhs by endocrine. HD per nephro. Trops downtrended, was likely elevated from ESRD.     Problem/Plan - 1:  ·  Problem: Hypoglycemia.  ·  Plan: - Patient presented with c/o generalized weakness and fall and was found to have blood sugar in 30's  - S/p Dextrose 50% IV X1 in the ED and D5 NS 1L X1 with appropriate response  - Patient with hx of T2DM, on Lantus 40 units qhs not on any oral hypoglycemics per outpatient pharmacy   [facility]  - endocrine consulted.     Problem/Plan - 2:  ·  Problem: SIRS (systemic inflammatory response syndrome).  ·  Plan: - Patient presented to the ED with the c/o Generalized weakness and possible fall and was found to have hypothermia 94.9 and leukocytosis of 16.82  - Patient meets SIRS criteria -   - UA unremarkable, no abdominal pain, CXR with no infiltrates seen on wet read   - Patient was found to be hypoglycemic on arrival to the ED with POCS in 30's  - Hypothermia and leukocytosis likely reactive in the setting of hypoglycemia  - CT BRAIN: No acute intracranial bleeding. Central volume loss, chronic microvascular ischemic changes, and chronic bilateral lacunar infarctions.  - CT CERVICAL SPINE: No fracture. Grade 1 C4-C5 anterolisthesis. C6-C7 disc degeneration. Bilateral pleural effusions and interlobular septal thickening due to interstitial edema.  Blood cx, urine cx - No growth  6/17/22 - on exam, pt has dry gangrene on R great hallux, poor toenail hygiene -- will likely need amputation of great toe -- pods, vascular, ID consulted -- started on rocephin,. Cardiac clearance prior to any procedure or intervention.  Angiography and bypass, Hallux amputation probably 6/21.   - ID is following      Problem/Plan - 3:  ·  Problem: HFrEF (heart failure with reduced ejection fraction).   ·  Plan: - patient with hx of HFrEF  - last ECHO in 04/22 shows EF of 45%  - Admission labs show ProBNP of [id number]  - Likely elevated in the setting of ESRD  - Consult Dr. James, will appreciate recs   - Avoid excessive fluids.     Problem/Plan - 4:  ·  Problem: Elevated troponin.   ·  Plan: - Admission labs show elevated Troponin of 275.9  - Patient denies any chest pain, sob or palpitations  - EKG shows NSR with left axis deviation with non specific ST and T waves changes  - elevated troponin likely due to ESRD  - trend x3 or to peak.     Problem/Plan - 5:  ·  Problem: ESRD on hemodialysis.   ·  Plan: - Patient with hx of ESRD  - On HD TTS schedule  - admission eGFR 9  - Follows Dr. Edwards as out patient, will consult Dr. Edwards, appreciate recs.     Problem/Plan - 6:  ·  Problem: T2DM (type 2 diabetes mellitus).   ·  Plan: - Chronic stable  - On Lantus 40 qhs  - Will start on lantus 20 units qhs with LDSS given hypoglycemic episode  - Accu checks AC/HS  - F/U A1C  - Adjust insulin requirement based on blood sugar levels  - per outpatient pharmacy patient recently rx ademolog however has not yet picked up and pharmacy unsure of dose   - endocrinology consulted, Dr. Perlman.     Problem/Plan - 7:  ·  Problem: Atrial fibrillation.   ·  Plan: - Patient with hx of P A.Fib  - Not on any AC because of hx of multiple falls   - on Lopressor 100mg q12 and Diltiazem 60mg q8 - will continue with hold parameters  - EKG shows NSR with left axis deviation with non specific ST and T waves changes.     Problem/Plan - 8:  ·  Problem: Benign essential HTN.   ·  Plan: - Chronic stable  - VSS  - Continue Amlodipine 5 mg with hold parameters  - Dash/Renal Diet  - continue to monitor routine hemodynamics.     Problem/Plan - 9:  ·  Problem: HLD (hyperlipidemia).   ·  Plan; - Chronic stable  - On atorvastatin 40mg at home - will continue  - Dash/Renal diet.     Problem/Plan - 10:  ·  Problem: Depression, major.   ·  Plan: - Chronic stable  - Continue imipramine 50qhs.     Problem/Plan - 11:  ·  Problem: Need for prophylactic measure.   ·  Plan: - DVT Prophylaxis: Heparin 5000 units q12.

## 2022-06-18 NOTE — PROGRESS NOTE ADULT - SUBJECTIVE AND OBJECTIVE BOX
Patient is a 71y Female whom presented to the hospital with esrd on hd     PAST MEDICAL & SURGICAL HISTORY:  Diabetes mellitus II      HTN (hypertension)      h/o Anxiety attack      Depression      h/o Myocardial infarct 2007      CAD (coronary artery disease)      h/o Hepatitis A 1969  currently resolved, no symptoms      PAD (peripheral artery disease)      Murmur, cardiac      h/o Smoking  quitted 3/2012      CRF (chronic renal failure), unspecified stage      Dialysis patient      Anemia secondary to renal failure      HTN (hypertension)      coronary stent 2007      s/p Ovarian cyst removal      s/p surgical removal of benign Skin lesion epigastric area          MEDICATIONS  (STANDING):  amLODIPine   Tablet 5 milliGRAM(s) Oral daily  aspirin enteric coated 81 milliGRAM(s) Oral daily  atorvastatin 40 milliGRAM(s) Oral at bedtime  calcium acetate 667 milliGRAM(s) Oral three times a day with meals  cefTRIAXone   IVPB 1000 milliGRAM(s) IV Intermittent every 24 hours  cloNIDine 0.1 milliGRAM(s) Oral two times a day  clopidogrel Tablet 75 milliGRAM(s) Oral daily  dextrose 5%. 1000 milliLiter(s) (100 mL/Hr) IV Continuous <Continuous>  dextrose 5%. 1000 milliLiter(s) (50 mL/Hr) IV Continuous <Continuous>  dextrose 50% Injectable 25 Gram(s) IV Push once  dextrose 50% Injectable 12.5 Gram(s) IV Push once  dextrose 50% Injectable 25 Gram(s) IV Push once  diltiazem    Tablet 60 milliGRAM(s) Oral every 8 hours  glucagon  Injectable 1 milliGRAM(s) IntraMuscular once  heparin   Injectable 5000 Unit(s) SubCutaneous every 12 hours  imipramine 50 milliGRAM(s) Oral daily  insulin glargine Injectable (LANTUS) 12 Unit(s) SubCutaneous at bedtime  insulin lispro (ADMELOG) corrective regimen sliding scale   SubCutaneous three times a day before meals  insulin lispro (ADMELOG) corrective regimen sliding scale   SubCutaneous at bedtime  metoprolol tartrate 100 milliGRAM(s) Oral every 12 hours  pantoprazole    Tablet 40 milliGRAM(s) Oral before breakfast  povidone iodine 10% Solution 1 Application(s) Topical daily      Allergies    latex (Unknown)  No Known Drug Allergies    Intolerances        SOCIAL HISTORY:  Denies ETOh,Smoking,     FAMILY HISTORY:  No pertinent family history in first degree relatives        REVIEW OF SYSTEMS:    CONSTITUTIONAL: No weakness, fevers or chills  RESPIRATORY: No cough, wheezing, hemoptysis; No shortness of breath  CARDIOVASCULAR: No chest pain or palpitations  GASTROINTESTINAL: No abdominal or epigastric pain. No nausea, vomiting,     No diarrhea or constipation. No melena   SKIN: dry                                                  11.2   10.73 )-----------( 251      ( 18 Jun 2022 07:54 )             35.3       CBC Full  -  ( 18 Jun 2022 07:54 )  WBC Count : 10.73 K/uL  RBC Count : 3.85 M/uL  Hemoglobin : 11.2 g/dL  Hematocrit : 35.3 %  Platelet Count - Automated : 251 K/uL  Mean Cell Volume : 91.7 fl  Mean Cell Hemoglobin : 29.1 pg  Mean Cell Hemoglobin Concentration : 31.7 gm/dL  Auto Neutrophil # : 7.41 K/uL  Auto Lymphocyte # : 1.47 K/uL  Auto Monocyte # : 1.48 K/uL  Auto Eosinophil # : 0.21 K/uL  Auto Basophil # : 0.03 K/uL  Auto Neutrophil % : 69.0 %  Auto Lymphocyte % : 13.7 %  Auto Monocyte % : 13.8 %  Auto Eosinophil % : 2.0 %  Auto Basophil % : 0.3 %      06-18    136  |  98  |  39<H>  ----------------------------<  81  4.1   |  29  |  4.90<H>    Ca    9.0      18 Jun 2022 07:54    TPro  6.6  /  Alb  2.8<L>  /  TBili  0.5  /  DBili  x   /  AST  31  /  ALT  26  /  AlkPhos  113  06-17      CAPILLARY BLOOD GLUCOSE      POCT Blood Glucose.: 125 mg/dL (18 Jun 2022 17:01)  POCT Blood Glucose.: 218 mg/dL (18 Jun 2022 11:21)  POCT Blood Glucose.: 74 mg/dL (18 Jun 2022 07:45)  POCT Blood Glucose.: 146 mg/dL (17 Jun 2022 21:10)      Vital Signs Last 24 Hrs  T(C): 36.6 (18 Jun 2022 17:57), Max: 37.1 (18 Jun 2022 05:03)  T(F): 97.9 (18 Jun 2022 17:57), Max: 98.7 (18 Jun 2022 05:03)  HR: 72 (18 Jun 2022 17:57) (66 - 72)  BP: 137/60 (18 Jun 2022 17:57) (122/69 - 147/69)  BP(mean): --  RR: 18 (18 Jun 2022 17:57) (18 - 18)  SpO2: 95% (18 Jun 2022 17:57) (91% - 96%)              PHYSICAL EXAM:    Constitutional: NAD  HEENT: conjunctive   clear   Neck:  No JVD  Respiratory: CTAB  Cardiovascular: S1 and S2  Gastrointestinal: BS+, soft, NT/ND  Extremities: No peripheral edema  , pos toe bandage   : No Renteria  Skin: No rashes  Access: pos fistula

## 2022-06-18 NOTE — PROGRESS NOTE ADULT - PROBLEM SELECTOR PLAN 1
decrease lantus 8 units qhs  cont low dose admelog corrective scale coverage qac/qhs  cont cons cho diet  goal bg conservative mngmt

## 2022-06-18 NOTE — PROGRESS NOTE ADULT - ASSESSMENT
Patient is a 71 year old female with PMH of A.fib rate controlled not on AC for hx of multiple falls, T2DM, HTN, HLD, ESRD on HD, CHF, s/p CABG brought in by EMS for generalized weakness at home. Patient states that she was doing well when in the afternoon she tried to get up from her couch and felt weak in the LE and fell back in her couch. Denies any hx of head trauma or loss of consciousness. Denies any weakness or numbness. Denies any chest pain, SOB or palpitations. No fever, cough or chills. No hx of recent travel or sick contacts. At the time of EMS arrival patient was found to have POCBS of 50. EMS gave dextrose and on arrival to the ED patient blood sugar was found to be in 30's with hypothermia of 94.9.    ED course:   Vitals:   BP: 176/85  HR:76  Temp:94.2  RR:18, O2:96% on RA   Significant Labs:   CBC: WBC:16.82 Hb:13.2 , Platlets: 260, Neutro:89   CMP: Lytes: WNL, BUN/Creatinine:48/4.8, Glucose:134 , Alkaline Phos:128, AST, 41, eGFR: 9, HsTrop: 275.9, ProBNP: 341425, Lactate: 2  UA; negative  CXR: Heart and lung appear normal (self read)  CT BRAIN: No acute intracranial bleeding. Central volume loss, chronic microvascular ischemic changes, and chronic bilateral lacunar infarctions.  CT CERVICAL SPINE: No fracture. Grade 1 C4-C5 anterolisthesis. C6-C7 disc degeneration. Bilateral pleural effusions and interlobular septal thickening due to   interstitial edema.  EKG: NSR, left axis deviation, HR 71,   QT/QTc: 426/462  Patient received: Ceftriaxone 1 g x1, Dextrose 5% + NS 1L x1, Dextrose 50% IV X1, heating blanket, 2L NC   (16 Jun 2022 01:19)      esrd on hd tues thurs sat     ANEMIA PLAN:  Anemia of chronic disease:  We will continue epo  aiming for a HCT of 32-36 %.   We will monitor Iron stores, B12 and RBC folate .    BP monitoring,continue current antihypertensive meds, low salt diet    f/u  blood and urine cx,serial lactate levels,monitor vitals closley,ivfs hydration,monitor urine output and renal profile,iv abx

## 2022-06-18 NOTE — PROGRESS NOTE ADULT - SUBJECTIVE AND OBJECTIVE BOX
CC: Right hallux gangrene.  Weakness. ESRD on HD   HPI:  Patient is a 71 year old female with PMH of A.fib rate controlled not on AC for hx of multiple falls, T2DM, HTN, HLD, ESRD on HD, CHF, s/p CABG brought in by EMS for generalized weakness at home. Patient states that she was doing well when in the afternoon she tried to get up from her couch and felt weak in the LE and fell back in her couch. Denies any hx of head trauma or loss of consciousness. Denies any weakness or numbness. Denies any chest pain, SOB or palpitations. No fever, cough or chills. No hx of recent travel or sick contacts. At the time of EMS arrival patient was found to have POCBS of 50. EMS gave dextrose and on arrival to the ED patient blood sugar was found to be in 30's with hypothermia of 94.9.    ED course:   Vitals:   BP: 176/85  HR:76  Temp:94.2  RR:18, O2:96% on RA   Significant Labs:   CBC: WBC:16.82 Hb:13.2 , Platlets: 260, Neutro:89   CMP: Lytes: WNL, BUN/Creatinine:48/4.8, Glucose:134 , Alkaline Phos:128, AST, 41, eGFR: 9, HsTrop: 275.9, ProBNP: [id number], Lactate: 2  UA; negative  CXR: Heart and lung appear normal (self read)  CT BRAIN: No acute intracranial bleeding. Central volume loss, chronic microvascular ischemic changes, and chronic bilateral lacunar infarctions.  CT CERVICAL SPINE: No fracture. Grade 1 C4-C5 anterolisthesis. C6-C7 disc degeneration. Bilateral pleural effusions and interlobular septal thickening due to   interstitial edema.  EKG: NSR, left axis deviation, HR 71,   QT/QTc: 426/462  Patient received: Ceftriaxone 1 g x1, Dextrose 5% + NS 1L x1, Dextrose 50% IV X1, heating blanket, 2L NC   (16 Jun 2022 01:19)    REVIEW OF SYSTEMS:    Patient denied fever, chills, abdominal pain, nausea, vomiting, cough, shortness of breath, chest pain or palpitations    Vital Signs Last 24 Hrs  T(C): 36.6 (18 Jun 2022 14:50), Max: 37.1 (18 Jun 2022 05:03)  T(F): 97.9 (18 Jun 2022 14:50), Max: 98.7 (18 Jun 2022 05:03)  HR: 70 (18 Jun 2022 14:50) (66 - 71)  BP: 133/85 (18 Jun 2022 14:50) (116/66 - 147/69)  BP(mean): --  RR: 18 (18 Jun 2022 14:50) (18 - 18)  SpO2: 96% (18 Jun 2022 14:50) (91% - 96%)I&O's Summary    18 Jun 2022 07:01  -  18 Jun 2022 16:52  --------------------------------------------------------  IN: 290 mL / OUT: 0 mL / NET: 290 mL      PHYSICAL EXAM:  GENERAL: NAD,   HEENT: PERRL, +EOMI, anicteric, no [place]  NECK: Supple, No JVD   CHEST/LUNG: CTA bilaterally; Normal effort  HEART: S1S2 Normal intensity, no murmurs, gallops or rubs noted  ABDOMEN: Soft, BS Normoactive, NT, ND, no HSM noted  EXTREMITIES:  Right hallux dry gangrene. No edema noted, Limited mobility   SKIN: No rashes or lesions noted  NEURO: Lethargy , no focal deficits noted, CN II-XII intact  PSYCH: Depressed mood and affect; insight/judgement inappropriate  LABS:                        11.2   10.73 )-----------( 251      ( 18 Jun 2022 07:54 )             35.3     06-18    136  |  98  |  39<H>  ----------------------------<  81  4.1   |  29  |  4.90<H>    Ca    9.0      18 Jun 2022 07:54    TPro  6.6  /  Alb  2.8<L>  /  TBili  0.5  /  DBili  x   /  AST  31  /  ALT  26  /  AlkPhos  113  06-17        RADIOLOGY & ADDITIONAL TESTS:    MEDICATIONS:  MEDICATIONS  (STANDING):  amLODIPine   Tablet 5 milliGRAM(s) Oral daily  aspirin enteric coated 81 milliGRAM(s) Oral daily  atorvastatin 40 milliGRAM(s) Oral at bedtime  calcium acetate 667 milliGRAM(s) Oral three times a day with meals  cefTRIAXone   IVPB 1000 milliGRAM(s) IV Intermittent every 24 hours  cloNIDine 0.1 milliGRAM(s) Oral two times a day  clopidogrel Tablet 75 milliGRAM(s) Oral daily  dextrose 5%. 1000 milliLiter(s) (100 mL/Hr) IV Continuous <Continuous>  dextrose 5%. 1000 milliLiter(s) (50 mL/Hr) IV Continuous <Continuous>  dextrose 50% Injectable 25 Gram(s) IV Push once  dextrose 50% Injectable 12.5 Gram(s) IV Push once  dextrose 50% Injectable 25 Gram(s) IV Push once  diltiazem    Tablet 60 milliGRAM(s) Oral every 8 hours  glucagon  Injectable 1 milliGRAM(s) IntraMuscular once  heparin   Injectable 5000 Unit(s) SubCutaneous every 12 hours  imipramine 50 milliGRAM(s) Oral daily  insulin glargine Injectable (LANTUS) 8 Unit(s) SubCutaneous at bedtime  insulin lispro (ADMELOG) corrective regimen sliding scale   SubCutaneous three times a day before meals  insulin lispro (ADMELOG) corrective regimen sliding scale   SubCutaneous at bedtime  metoprolol tartrate 100 milliGRAM(s) Oral every 12 hours  pantoprazole    Tablet 40 milliGRAM(s) Oral before breakfast  povidone iodine 10% Solution 1 Application(s) Topical daily    MEDICATIONS  (PRN):  acetaminophen     Tablet .. 650 milliGRAM(s) Oral every 6 hours PRN Temp greater or equal to 38C (100.4F), Mild Pain (1 - 3)  aluminum hydroxide/magnesium hydroxide/simethicone Suspension 30 milliLiter(s) Oral every 4 hours PRN Dyspepsia  dextrose Oral Gel 15 Gram(s) Oral once PRN Blood Glucose LESS THAN 70 milliGRAM(s)/deciliter  diphenhydrAMINE Injectable 25 milliGRAM(s) IV Push <User Schedule> PRN Combative behavior  melatonin 3 milliGRAM(s) Oral at bedtime PRN Insomnia  ondansetron Injectable 4 milliGRAM(s) IV Push every 8 hours PRN Nausea and/or Vomiting

## 2022-06-18 NOTE — PROGRESS NOTE ADULT - SUBJECTIVE AND OBJECTIVE BOX
71y year old Female seen at hospitals 2EAS 205 D1 for right hallux gangrene. Pt doing well. Resting comfortably. Accompanied by cousin Ludy Delatorre. Denies any fever, chills, nausea, vomiting, chest pain, shortness of breath, or calf pain at this time.    Allergies    latex (Unknown)  No Known Drug Allergies    Intolerances        MEDICATIONS  (STANDING):  amLODIPine   Tablet 5 milliGRAM(s) Oral daily  aspirin enteric coated 81 milliGRAM(s) Oral daily  atorvastatin 40 milliGRAM(s) Oral at bedtime  calcium acetate 667 milliGRAM(s) Oral three times a day with meals  cefTRIAXone   IVPB 1000 milliGRAM(s) IV Intermittent every 24 hours  cloNIDine 0.1 milliGRAM(s) Oral two times a day  clopidogrel Tablet 75 milliGRAM(s) Oral daily  dextrose 5%. 1000 milliLiter(s) (100 mL/Hr) IV Continuous <Continuous>  dextrose 5%. 1000 milliLiter(s) (50 mL/Hr) IV Continuous <Continuous>  dextrose 50% Injectable 25 Gram(s) IV Push once  dextrose 50% Injectable 12.5 Gram(s) IV Push once  dextrose 50% Injectable 25 Gram(s) IV Push once  diltiazem    Tablet 60 milliGRAM(s) Oral every 8 hours  glucagon  Injectable 1 milliGRAM(s) IntraMuscular once  heparin   Injectable 5000 Unit(s) SubCutaneous every 12 hours  imipramine 50 milliGRAM(s) Oral daily  insulin glargine Injectable (LANTUS) 8 Unit(s) SubCutaneous at bedtime  insulin lispro (ADMELOG) corrective regimen sliding scale   SubCutaneous three times a day before meals  insulin lispro (ADMELOG) corrective regimen sliding scale   SubCutaneous at bedtime  metoprolol tartrate 100 milliGRAM(s) Oral every 12 hours  pantoprazole    Tablet 40 milliGRAM(s) Oral before breakfast  povidone iodine 10% Solution 1 Application(s) Topical daily    MEDICATIONS  (PRN):  acetaminophen     Tablet .. 650 milliGRAM(s) Oral every 6 hours PRN Temp greater or equal to 38C (100.4F), Mild Pain (1 - 3)  aluminum hydroxide/magnesium hydroxide/simethicone Suspension 30 milliLiter(s) Oral every 4 hours PRN Dyspepsia  dextrose Oral Gel 15 Gram(s) Oral once PRN Blood Glucose LESS THAN 70 milliGRAM(s)/deciliter  diphenhydrAMINE Injectable 25 milliGRAM(s) IV Push <User Schedule> PRN Combative behavior  melatonin 3 milliGRAM(s) Oral at bedtime PRN Insomnia  ondansetron Injectable 4 milliGRAM(s) IV Push every 8 hours PRN Nausea and/or Vomiting      Vital Signs Last 24 Hrs  T(C): 36.7 (18 Jun 2022 11:40), Max: 37.1 (18 Jun 2022 05:03)  T(F): 98 (18 Jun 2022 11:40), Max: 98.7 (18 Jun 2022 05:03)  HR: 68 (18 Jun 2022 13:47) (66 - 71)  BP: 122/69 (18 Jun 2022 13:47) (116/66 - 147/69)  BP(mean): --  RR: 18 (18 Jun 2022 11:40) (18 - 18)  SpO2: 93% (18 Jun 2022 11:40) (91% - 95%)    PHYSICAL EXAM:  Vascular: DP & PT NON-palpable bilaterally, Capillary refill delayed by 5 secs, Digital hair absent bilaterally  Neurological: Light touch sensation diminished bilaterally  Musculoskeletal: 5/5 strength in all quadrants bilaterally, AJ & STJ ROM intact  Dermatological: 0.5 cm x 0.5 cm medial malleolus ulceration noted to the left ankle, granular wound bed, probe to bone, no periwound erythema, no fluctuance, no malodor, no proximal streaking at this time. Right hallux gangrene noted with signs of demarcation to the PIPJ. Mild periwound erythema, no fluctuance, no malodor, no proximal streaking at this time.    CBC Full  -  ( 18 Jun 2022 07:54 )  WBC Count : 10.73 K/uL  RBC Count : 3.85 M/uL  Hemoglobin : 11.2 g/dL  Hematocrit : 35.3 %  Platelet Count - Automated : 251 K/uL  Mean Cell Volume : 91.7 fl  Mean Cell Hemoglobin : 29.1 pg  Mean Cell Hemoglobin Concentration : 31.7 gm/dL  Auto Neutrophil # : 7.41 K/uL  Auto Lymphocyte # : 1.47 K/uL  Auto Monocyte # : 1.48 K/uL  Auto Eosinophil # : 0.21 K/uL  Auto Basophil # : 0.03 K/uL  Auto Neutrophil % : 69.0 %  Auto Lymphocyte % : 13.7 %  Auto Monocyte % : 13.8 %  Auto Eosinophil % : 2.0 %  Auto Basophil % : 0.3 %      ----------CHEM PANEL----------          Culture - Urine (collected 16 Jun 2022 03:40)  Source: Clean Catch Clean Catch (Midstream)  Final Report (17 Jun 2022 07:32):    <10,000 CFU/mL Normal Urogenital Shantel    Culture - Blood (collected 16 Jun 2022 03:38)  Source: .Blood Blood-Peripheral  Preliminary Report (17 Jun 2022 04:01):    No growth to date.    Culture - Blood (collected 16 Jun 2022 03:38)  Source: .Blood Blood-Peripheral  Preliminary Report (17 Jun 2022 04:00):    No growth to date.        Imaging: ----------

## 2022-06-18 NOTE — PROGRESS NOTE ADULT - SUBJECTIVE AND OBJECTIVE BOX
[facility]  INFECTIOUS DISEASES   [address] [place] [address]  Tel: [phone]     Fax: [phone]  ======================================================  MD Zita Alejandra Kaushal, MD Cho, Michelle, MD   ======================================================    [id number]  SHILO KOHLER     Follow up: R foot wound/gangrene     No new changes, no pain in foot, no fever.      PAST MEDICAL & SURGICAL HISTORY:  Diabetes mellitus II  HTN (hypertension)  h/o Anxiety attack  Depression  h/o Myocardial infarct 2007  CAD (coronary artery disease)  h/o Hepatitis A 1969  currently resolved, no symptoms  PAD (peripheral artery disease)  Murmur, cardiac  h/o Smoking  quitted 3/2012  CRF (chronic renal failure), unspecified stage  Dialysis patient  Anemia secondary to renal failure  HTN (hypertension)  coronary stent 2007  s/p Ovarian cyst removal  s/p surgical removal of benign Skin lesion epigastric area    Social Hx: no current smoking, ETOH or drugs     FAMILY HISTORY:  No pertinent family history in first degree relatives    Allergies  latex (Unknown)  No Known Drug Allergies    Antibiotics:  zosyn     REVIEW OF SYSTEMS:  CONSTITUTIONAL:  No Fever or chills  HEENT:  No diplopia or blurred vision.  No sore throat or runny nose.  CARDIOVASCULAR:  No chest pain or SOB.  RESPIRATORY:  No cough, shortness of breath, PND or orthopnea.  GASTROINTESTINAL:  No nausea, vomiting or diarrhea.  GENITOURINARY:  No dysuria, frequency or urgency. No Blood in urine  MUSCULOSKELETAL:  no joint aches, no muscle pain  SKIN:  R foot wound   NEUROLOGIC:  No paresthesias, fasciculations, seizures or weakness.  PSYCHIATRIC:  No disorder of thought or mood.  ENDOCRINE:  No heat or cold intolerance, polyuria or polydipsia.  HEMATOLOGICAL:  No easy bruising or bleeding.     Physical Exam:  Vital Signs Last 24 Hrs  T(C): 36.7 (18 Jun 2022 11:40), Max: 37.1 (18 Jun 2022 05:03)  T(F): 98 (18 Jun 2022 11:40), Max: 98.7 (18 Jun 2022 05:03)  HR: 69 (18 Jun 2022 11:40) (66 - 71)  BP: 129/66 (18 Jun 2022 11:40) (116/66 - 147/69)  BP(mean): --  RR: 18 (18 Jun 2022 11:40) (18 - 18)  SpO2: 93% (18 Jun 2022 11:40) (91% - 95%)  GEN: NAD  HEENT: normocephalic and atraumatic. EOMI. PERRL.    NECK: Supple.  No lymphadenopathy   LUNGS: Clear to auscultation.  HEART: Irregular rate and rhythm   ABDOMEN: Soft, nontender, and nondistended.  Positive bowel sounds.    : No CVA tenderness  EXTREMITIES: R hallux with an ulcer, looks gangrenous with superimposed infection   has some bloody discharge. R heel ulcer very superficial,   left medial malleolus small ulcer minimal serous discharge   NEUROLOGIC: grossly intact.  PSYCHIATRIC: Appropriate affect .  SKIN: No rash     Labs:                        11.2   10.73 )-----------( 251      ( 18 Jun 2022 07:54 )             35.3     06-18    136  |  98  |  39<H>  ----------------------------<  81  4.1   |  29  |  4.90<H>    Ca    9.0      18 Jun 2022 07:54    TPro  6.6  /  Alb  2.8<L>  /  TBili  0.5  /  DBili  x   /  AST  31  /  ALT  26  /  AlkPhos  113  06-17    Culture - Urine (collected 06-16-22 @ 03:40)  Source: Clean Catch Clean Catch (Midstream)  Final Report (06-17-22 @ 07:32):    <10,000 CFU/mL Normal Urogenital Shantel    Culture - Blood (collected 06-16-22 @ 03:38)  Source: .Blood Blood-Peripheral    Culture - Blood (collected 06-16-22 @ 03:38)  Source: .Blood Blood-Peripheral    WBC Count: 10.73 K/uL (06-18-22 @ 07:54)  WBC Count: 12.79 K/uL (06-17-22 @ 07:52)  WBC Count: 16.09 K/uL (06-16-22 @ 11:57)  WBC Count: 16.82 K/uL (06-15-22 @ 21:13)    Creatinine, Serum: 4.90 mg/dL (06-18-22 @ 07:54)  Creatinine, Serum: 3.60 mg/dL (06-17-22 @ 07:52)  Creatinine, Serum: 5.40 mg/dL (06-16-22 @ 11:57)  Creatinine, Serum: 4.80 mg/dL (06-15-22 @ 21:13)    C-Reactive Protein, Serum: 19 mg/L (06-17-22 @ 10:38)    Sedimentation Rate, Erythrocyte: 13 mm/hr (06-17-22 @ 07:52)    SARS-CoV-2: NotDetec (06-15-22 @ 22:44)    All imaging and other data have been reviewed.  < from: CT Chest No Cont (06.16.22 @ 08:16) >  IMPRESSION:  Small layering bilateral pleural effusions decreased from 4/6/2022.  7 mm groundglass nodule within left lower lobe (series 2 image 44).   Recommend follow-up chest CT in 12 months to determine stability.    Assessment and Plan:   70 yo woman with PMH of HTN, DM2, CAD, CHF, A.fib, multiple falls and ESRD on HD was admitted on 6/16, came to ED with generalized weakness.   Her hypoglycemia and hypothermia on admission could be related to sepsis/SIRS source unclear at this time, could be foot infection? Left hallux looks like  a dry gangrene with superimposed infection.   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 She had Tissue cultures from 3/30/22 grew   klebsiella oxytoca/raoutella oxytoca, E. coli, MSSA so Cefazolin was given after HD to complete the course of treatment.   ESR 13 and CRP 19    Recommendations:  - Blood culture x 2 NGTD  - leukocytosis normalized from 16k to 10k  - Vascular surgery follow up noted, for angiogram and possibly bypass on 6/21   - Podiatry follow up noted for possible amputation on 6/21 if patient agrees(refused in the past)  - Will continue ceftriaxone based on old culture, not angry looking, no MRSA    - MRSA PCR pending     Will follow.    Geovany Long MD  Division of Infectious Diseases   Please call ID service at [phone] with any question.      35 minutes spent on total encounter assessing patient, examination, chart reivew, counseling and coordinating care by the attending physician/nurse/care manager.

## 2022-06-19 LAB
ALBUMIN SERPL ELPH-MCNC: 2.6 G/DL — LOW (ref 3.3–5)
ALP SERPL-CCNC: 100 U/L — SIGNIFICANT CHANGE UP (ref 40–120)
ALT FLD-CCNC: 17 U/L — SIGNIFICANT CHANGE UP (ref 12–78)
ANION GAP SERPL CALC-SCNC: 9 MMOL/L — SIGNIFICANT CHANGE UP (ref 5–17)
AST SERPL-CCNC: 18 U/L — SIGNIFICANT CHANGE UP (ref 15–37)
BILIRUB SERPL-MCNC: 0.5 MG/DL — SIGNIFICANT CHANGE UP (ref 0.2–1.2)
BUN SERPL-MCNC: 18 MG/DL — SIGNIFICANT CHANGE UP (ref 7–23)
CALCIUM SERPL-MCNC: 8.7 MG/DL — SIGNIFICANT CHANGE UP (ref 8.5–10.1)
CHLORIDE SERPL-SCNC: 97 MMOL/L — SIGNIFICANT CHANGE UP (ref 96–108)
CO2 SERPL-SCNC: 32 MMOL/L — HIGH (ref 22–31)
CREAT SERPL-MCNC: 3.3 MG/DL — HIGH (ref 0.5–1.3)
EGFR: 14 ML/MIN/1.73M2 — LOW
GLUCOSE SERPL-MCNC: 130 MG/DL — HIGH (ref 70–99)
HCT VFR BLD CALC: 36 % — SIGNIFICANT CHANGE UP (ref 34.5–45)
HGB BLD-MCNC: 11.4 G/DL — LOW (ref 11.5–15.5)
MCHC RBC-ENTMCNC: 28.9 PG — SIGNIFICANT CHANGE UP (ref 27–34)
MCHC RBC-ENTMCNC: 31.7 GM/DL — LOW (ref 32–36)
MCV RBC AUTO: 91.4 FL — SIGNIFICANT CHANGE UP (ref 80–100)
MRSA PCR RESULT.: SIGNIFICANT CHANGE UP
NRBC # BLD: 0 /100 WBCS — SIGNIFICANT CHANGE UP (ref 0–0)
PLATELET # BLD AUTO: 232 K/UL — SIGNIFICANT CHANGE UP (ref 150–400)
POTASSIUM SERPL-MCNC: 4 MMOL/L — SIGNIFICANT CHANGE UP (ref 3.5–5.3)
POTASSIUM SERPL-SCNC: 4 MMOL/L — SIGNIFICANT CHANGE UP (ref 3.5–5.3)
PROT SERPL-MCNC: 6.1 G/DL — SIGNIFICANT CHANGE UP (ref 6–8.3)
RBC # BLD: 3.94 M/UL — SIGNIFICANT CHANGE UP (ref 3.8–5.2)
RBC # FLD: 17.3 % — HIGH (ref 10.3–14.5)
S AUREUS DNA NOSE QL NAA+PROBE: SIGNIFICANT CHANGE UP
SODIUM SERPL-SCNC: 138 MMOL/L — SIGNIFICANT CHANGE UP (ref 135–145)
WBC # BLD: 9.99 K/UL — SIGNIFICANT CHANGE UP (ref 3.8–10.5)
WBC # FLD AUTO: 9.99 K/UL — SIGNIFICANT CHANGE UP (ref 3.8–10.5)

## 2022-06-19 PROCEDURE — 99232 SBSQ HOSP IP/OBS MODERATE 35: CPT

## 2022-06-19 RX ADMIN — Medication 81 MILLIGRAM(S): at 11:24

## 2022-06-19 RX ADMIN — CLOPIDOGREL BISULFATE 75 MILLIGRAM(S): 75 TABLET, FILM COATED ORAL at 11:24

## 2022-06-19 RX ADMIN — Medication 667 MILLIGRAM(S): at 12:06

## 2022-06-19 RX ADMIN — AMLODIPINE BESYLATE 5 MILLIGRAM(S): 2.5 TABLET ORAL at 05:47

## 2022-06-19 RX ADMIN — Medication 60 MILLIGRAM(S): at 21:22

## 2022-06-19 RX ADMIN — Medication 667 MILLIGRAM(S): at 09:07

## 2022-06-19 RX ADMIN — HEPARIN SODIUM 5000 UNIT(S): 5000 INJECTION INTRAVENOUS; SUBCUTANEOUS at 17:14

## 2022-06-19 RX ADMIN — Medication 0.1 MILLIGRAM(S): at 17:14

## 2022-06-19 RX ADMIN — ATORVASTATIN CALCIUM 40 MILLIGRAM(S): 80 TABLET, FILM COATED ORAL at 21:22

## 2022-06-19 RX ADMIN — Medication 667 MILLIGRAM(S): at 16:44

## 2022-06-19 RX ADMIN — Medication 0.1 MILLIGRAM(S): at 05:47

## 2022-06-19 RX ADMIN — Medication 1 APPLICATION(S): at 11:25

## 2022-06-19 RX ADMIN — Medication 100 MILLIGRAM(S): at 17:13

## 2022-06-19 RX ADMIN — Medication 100 MILLIGRAM(S): at 05:47

## 2022-06-19 RX ADMIN — Medication 60 MILLIGRAM(S): at 13:12

## 2022-06-19 RX ADMIN — Medication 60 MILLIGRAM(S): at 05:46

## 2022-06-19 RX ADMIN — HEPARIN SODIUM 5000 UNIT(S): 5000 INJECTION INTRAVENOUS; SUBCUTANEOUS at 05:46

## 2022-06-19 RX ADMIN — CEFTRIAXONE 100 MILLIGRAM(S): 500 INJECTION, POWDER, FOR SOLUTION INTRAMUSCULAR; INTRAVENOUS at 13:13

## 2022-06-19 RX ADMIN — Medication 50 MILLIGRAM(S): at 11:24

## 2022-06-19 RX ADMIN — INSULIN GLARGINE 8 UNIT(S): 100 INJECTION, SOLUTION SUBCUTANEOUS at 21:22

## 2022-06-19 RX ADMIN — Medication 2: at 16:44

## 2022-06-19 RX ADMIN — PANTOPRAZOLE SODIUM 40 MILLIGRAM(S): 20 TABLET, DELAYED RELEASE ORAL at 05:48

## 2022-06-19 RX ADMIN — Medication 1: at 08:12

## 2022-06-19 NOTE — PROGRESS NOTE ADULT - ASSESSMENT
Patient is a 71 year old female with PMH of A.fib rate controlled not on AC for hx of multiple falls, T2DM, HTN, HLD, ESRD on HD, CHF, s/p CABG brought in by EMS for generalized weakness at home. Patient states that she was doing well when in the afternoon she tried to get up from her couch and felt weak in the LE and fell back in her couch. Denies any hx of head trauma or loss of consciousness. Denies any weakness or numbness. Denies any chest pain, SOB or palpitations. No fever, cough or chills. No hx of recent travel or sick contacts. At the time of EMS arrival patient was found to have POCBS of 50. EMS gave dextrose and on arrival to the ED patient blood sugar was found to be in 30's with hypothermia of 94.9.    ED course:   Vitals:   BP: 176/85  HR:76  Temp:94.2  RR:18, O2:96% on RA   Significant Labs:   CBC: WBC:16.82 Hb:13.2 , Platlets: 260, Neutro:89   CMP: Lytes: WNL, BUN/Creatinine:48/4.8, Glucose:134 , Alkaline Phos:128, AST, 41, eGFR: 9, HsTrop: 275.9, ProBNP: [id number], Lactate: 2  UA; negative  CXR: Heart and lung appear normal (self read)  CT BRAIN: No acute intracranial bleeding. Central volume loss, chronic microvascular ischemic changes, and chronic bilateral lacunar infarctions.  CT CERVICAL SPINE: No fracture. Grade 1 C4-C5 anterolisthesis. C6-C7 disc degeneration. Bilateral pleural effusions and interlobular septal thickening due to   interstitial edema.  EKG: NSR, left axis deviation, HR 71,   QT/QTc: 426/462  Patient received: Ceftriaxone 1 g x1, Dextrose 5% + NS 1L x1, Dextrose 50% IV X1, heating blanket, 2L NC   (16 Jun 2022 01:19)      esrd on hd tues thurs sat     ANEMIA PLAN:  Anemia of chronic disease:  We will continue epo  aiming for a HCT of 32-36 %.   We will monitor Iron stores, B12 and RBC folate .    BP monitoring,continue current antihypertensive meds, low salt diet    f/u  blood and urine cx,serial lactate levels,monitor vitals closley,ivfs hydration,monitor urine output and renal profile,iv abx

## 2022-06-19 NOTE — PROGRESS NOTE ADULT - SUBJECTIVE AND OBJECTIVE BOX
Patient is a 71y Female with a known history of :  SIRS (systemic inflammatory response syndrome) [R65.10]    Hypoglycemia [E16.2]    Pleural effusion [J90]    CHF, acute [I50.9]    Chronic CHF [I50.9]    Elevated troponin [R77.8]    Atrial fibrillation [I48.91]    Benign essential HTN [I10]    HLD (hyperlipidemia) [E78.5]    Depression, major [F32.9]    Need for prophylactic measure [Z29.9]    ESRD on hemodialysis [N18.6]    T2DM (type 2 diabetes mellitus) [E11.9]    HFrEF (heart failure with reduced ejection fraction) [I50.20]    Gangrene of toe of right foot [I96]      HPI:  Patient is a 71 year old female with PMH of A.fib rate controlled not on AC for hx of multiple falls, T2DM, HTN, HLD, ESRD on HD, CHF, s/p CABG brought in by EMS for generalized weakness at home. Patient states that she was doing well when in the afternoon she tried to get up from her couch and felt weak in the LE and fell back in her couch. Denies any hx of head trauma or loss of consciousness. Denies any weakness or numbness. Denies any chest pain, SOB or palpitations. No fever, cough or chills. No hx of recent travel or sick contacts. At the time of EMS arrival patient was found to have POCBS of 50. EMS gave dextrose and on arrival to the ED patient blood sugar was found to be in 30's with hypothermia of 94.9.    ED course:   Vitals:   BP: 176/85  HR:76  Temp:94.2  RR:18, O2:96% on RA   Significant Labs:   CBC: WBC:16.82 Hb:13.2 , Platlets: 260, Neutro:89   CMP: Lytes: WNL, BUN/Creatinine:48/4.8, Glucose:134 , Alkaline Phos:128, AST, 41, eGFR: 9, HsTrop: 275.9, ProBNP: 033320, Lactate: 2  UA; negative  CXR: Heart and lung appear normal (self read)  CT BRAIN: No acute intracranial bleeding. Central volume loss, chronic microvascular ischemic changes, and chronic bilateral lacunar infarctions.  CT CERVICAL SPINE: No fracture. Grade 1 C4-C5 anterolisthesis. C6-C7 disc degeneration. Bilateral pleural effusions and interlobular septal thickening due to   interstitial edema.  EKG: NSR, left axis deviation, HR 71,   QT/QTc: 426/462  Patient received: Ceftriaxone 1 g x1, Dextrose 5% + NS 1L x1, Dextrose 50% IV X1, heating blanket, 2L NC   (16 Jun 2022 01:19)      REVIEW OF SYSTEMS:    CONSTITUTIONAL: No fever, weight loss, or fatigue  EYES: No eye pain, visual disturbances, or discharge  ENMT:  No difficulty hearing, tinnitus, vertigo; No sinus or throat pain  NECK: No pain or stiffness  BREASTS: No pain, masses, or nipple discharge  RESPIRATORY: No cough, wheezing, chills or hemoptysis; No shortness of breath  CARDIOVASCULAR: No chest pain, palpitations, dizziness, or leg swelling  GASTROINTESTINAL: No abdominal or epigastric pain. No nausea, vomiting, or hematemesis; No diarrhea or constipation. No melena or hematochezia.  GENITOURINARY: No dysuria, frequency, hematuria, or incontinence  NEUROLOGICAL: No headaches, memory loss, loss of strength, numbness, or tremors  SKIN: No itching, burning, rashes, or lesions   LYMPH NODES: No enlarged glands  ENDOCRINE: No heat or cold intolerance; No hair loss  MUSCULOSKELETAL: No joint pain or swelling; No muscle, back, or extremity pain  PSYCHIATRIC: No depression, anxiety, mood swings, or difficulty sleeping  HEME/LYMPH: No easy bruising, or bleeding gums  ALLERGY AND IMMUNOLOGIC: No hives or eczema    MEDICATIONS  (STANDING):  amLODIPine   Tablet 5 milliGRAM(s) Oral daily  aspirin enteric coated 81 milliGRAM(s) Oral daily  atorvastatin 40 milliGRAM(s) Oral at bedtime  calcium acetate 667 milliGRAM(s) Oral three times a day with meals  cefTRIAXone   IVPB 1000 milliGRAM(s) IV Intermittent every 24 hours  cloNIDine 0.1 milliGRAM(s) Oral two times a day  clopidogrel Tablet 75 milliGRAM(s) Oral daily  dextrose 5%. 1000 milliLiter(s) (100 mL/Hr) IV Continuous <Continuous>  dextrose 5%. 1000 milliLiter(s) (50 mL/Hr) IV Continuous <Continuous>  dextrose 50% Injectable 25 Gram(s) IV Push once  dextrose 50% Injectable 12.5 Gram(s) IV Push once  dextrose 50% Injectable 25 Gram(s) IV Push once  diltiazem    Tablet 60 milliGRAM(s) Oral every 8 hours  glucagon  Injectable 1 milliGRAM(s) IntraMuscular once  heparin   Injectable 5000 Unit(s) SubCutaneous every 12 hours  imipramine 50 milliGRAM(s) Oral daily  insulin glargine Injectable (LANTUS) 8 Unit(s) SubCutaneous at bedtime  insulin lispro (ADMELOG) corrective regimen sliding scale   SubCutaneous three times a day before meals  insulin lispro (ADMELOG) corrective regimen sliding scale   SubCutaneous at bedtime  metoprolol tartrate 100 milliGRAM(s) Oral every 12 hours  pantoprazole    Tablet 40 milliGRAM(s) Oral before breakfast  povidone iodine 10% Solution 1 Application(s) Topical daily    MEDICATIONS  (PRN):  acetaminophen     Tablet .. 650 milliGRAM(s) Oral every 6 hours PRN Temp greater or equal to 38C (100.4F), Mild Pain (1 - 3)  aluminum hydroxide/magnesium hydroxide/simethicone Suspension 30 milliLiter(s) Oral every 4 hours PRN Dyspepsia  dextrose Oral Gel 15 Gram(s) Oral once PRN Blood Glucose LESS THAN 70 milliGRAM(s)/deciliter  diphenhydrAMINE Injectable 25 milliGRAM(s) IV Push <User Schedule> PRN Combative behavior  melatonin 3 milliGRAM(s) Oral at bedtime PRN Insomnia  ondansetron Injectable 4 milliGRAM(s) IV Push every 8 hours PRN Nausea and/or Vomiting      ALLERGIES: latex (Unknown)  No Known Drug Allergies      FAMILY HISTORY:  No pertinent family history in first degree relatives        PHYSICAL EXAMINATION:  -----------------------------  T(C): 36.4 (06-19-22 @ 05:45), Max: 36.8 (06-18-22 @ 20:40)  HR: 70 (06-19-22 @ 05:45) (68 - 74)  BP: 148/76 (06-19-22 @ 05:45) (122/69 - 148/76)  RR: 17 (06-19-22 @ 05:45) (16 - 18)  SpO2: 92% (06-19-22 @ 05:45) (91% - 96%)  Wt(kg): --    06-18 @ 07:01 - 06-19 @ 07:00  --------------------------------------------------------  IN:    IV PiggyBack: 50 mL    Oral Fluid: 240 mL  Total IN: 290 mL    OUT:  Total OUT: 0 mL    Total NET: 290 mL            VITALS  T(C): 36.4 (06-19-22 @ 05:45), Max: 36.8 (06-18-22 @ 20:40)  HR: 70 (06-19-22 @ 05:45) (68 - 74)  BP: 148/76 (06-19-22 @ 05:45) (122/69 - 148/76)  RR: 17 (06-19-22 @ 05:45) (16 - 18)  SpO2: 92% (06-19-22 @ 05:45) (91% - 96%)    Constitutional: well developed, normal appearance, well groomed, well nourished, no deformities and no acute distress.   Eyes: the conjunctiva exhibited no abnormalities and the eyelids demonstrated no xanthelasmas.   HEENT: normal oral mucosa, no oral pallor and no oral cyanosis.   Neck: normal jugular venous A waves present, normal jugular venous V waves present and no jugular venous wilson A waves.   Pulmonary: no respiratory distress, normal respiratory rhythm and effort, no accessory muscle use and lungs were clear to auscultation bilaterally.   Cardiovascular: heart rate and rhythm were normal, normal S1 and S2 and no murmur, gallop, rub, heave or thrill are present.   Abdomen: soft, non-tender, no hepato-splenomegaly and no abdominal mass palpated.   Musculoskeletal: the gait could not be assessed..   Extremities: no clubbing of the fingernails, no localized cyanosis, no petechial hemorrhages and no ischemic changes.   Skin: normal skin color and pigmentation, no rash, no venous stasis, no skin lesions, no skin ulcer and no xanthoma was observed.   Psychiatric: oriented to person, place, and time, the affect was normal, the mood was normal and not feeling anxious.     LABS:   --------  06-19    138  |  97  |  18  ----------------------------<  130<H>  4.0   |  32<H>  |  3.30<H>    Ca    8.7      19 Jun 2022 07:18    TPro  6.1  /  Alb  2.6<L>  /  TBili  0.5  /  DBili  x   /  AST  18  /  ALT  17  /  AlkPhos  100  06-19                         11.4   9.99  )-----------( 232      ( 19 Jun 2022 07:18 )             36.0                 RADIOLOGY:  -----------------    ECG:     ECHO:

## 2022-06-19 NOTE — PROGRESS NOTE ADULT - ASSESSMENT
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 Lantus dec to 10 units qhs by endocrine. HD per nephro. Trops downtrended, was likely elevated from ESRD.     Problem/Plan - 1:  ·  Problem: SIRS (systemic inflammatory response syndrome).  ·  Plan: - Patient presented to the ED with the c/o Generalized weakness and possible fall and was found to have hypothermia 94.9 and leukocytosis of 16.82  - Patient met SIRS criteria -   - UA unremarkable, no abdominal pain, CXR with no infiltrates seen on wet read   - Patient was found to be hypoglycemic on arrival to the ED with POCS in 30's  - Hypothermia and leukocytosis likely reactive in the setting of hypoglycemia  - CT BRAIN: No acute intracranial bleeding. Central volume loss, chronic microvascular ischemic changes, and chronic bilateral lacunar infarctions.  - CT CERVICAL SPINE: No fracture. Grade 1 C4-C5 anterolisthesis. C6-C7 disc degeneration. Bilateral pleural effusions and interlobular septal thickening due to interstitial edema.  Blood cx, urine cx - No growth  6/17/22 - on exam, pt has dry gangrene on R great hallux, poor toenail hygiene -- will likely need amputation of great toe    ID consulted -- started on Ceftriaxone   Per vascular surgery - Angiography and bypass, Hallux amputation probably 6/21.      Problem/Plan - 2:  ·  Problem: HFrEF (heart failure with reduced ejection fraction).   ·  Plan: - patient with hx of HFrEF  - last ECHO in 04/22 shows EF of 45%  - Admission labs show ProBNP of 029370  - Likely elevated in the setting of ESRD  - Consult Dr. James, cardiology is following       Problem/Plan - 3:  ·  Problem: Elevated troponin.   ·  Plan: - Admission labs show elevated Troponin of 275.9  - Patient denies any chest pain, sob or palpitations  - EKG shows NSR with left axis deviation with non specific ST and T waves changes  - elevated troponin likely due to ESRD.  Trended down on repeat testing        Problem/Plan - 4:  ·  Problem: ESRD on hemodialysis.   ·  Plan: - Patient with hx of ESRD  - On HD TTS schedule  - admission eGFR 9  - Follows Dr. Edwards as out patient and following inpatient      Problem/Plan - 5:  ·  Problem: T2DM (type 2 diabetes mellitus).   Hypoglycemia on admission   - On Lantus 40 qhs  - Will start on lantus 20 units qhs with LDSS given hypoglycemic episode  -  A1C is 9  - endocrinology  Dr. Perlman followii      Problem/Plan - 6:  ·  Problem: Atrial fibrillation.   ·  Plan: - Patient with hx of P A.Fib  - Not on any AC because of hx of multiple falls   - on Lopressor 100mg q12 and Diltiazem 60mg q8 - will continue with hold parameters  - EKG shows NSR with left axis deviation with non specific ST and T waves changes.     Problem/Plan - 7:  ·  Problem: Benign essential HTN.   ·  Plan: - Chronic stable  - Continue Amlodipine 5 mg with hold parameters  - continue to monitor routine hemodynamics.     Problem/Plan - 8:  ·  Problem: HLD (hyperlipidemia).   ·  Plan; - Chronic stable  - On atorvastatin 40mg at home - will continue  - Dash/Renal diet.     Problem/Plan - 9:  ·  Problem: Depression, major.   ·  Plan: - Chronic stable  - Continue imipramine 50qhs.     Problem/Plan - 10:  ·  Problem: Need for prophylactic measure.   ·  Plan: - DVT Prophylaxis: Heparin 5000 units q12.

## 2022-06-19 NOTE — PROGRESS NOTE ADULT - SUBJECTIVE AND OBJECTIVE BOX
CC: Right big toe infection and gangrene. ESRD on HD   HPI:  Patient is a 71 year old female with PMH of A.fib rate controlled not on AC for hx of multiple falls, T2DM, HTN, HLD, ESRD on HD, CHF, s/p CABG brought in by EMS for generalized weakness at home. Patient states that she was doing well when in the afternoon she tried to get up from her couch and felt weak in the LE and fell back in her couch. Denies any hx of head trauma or loss of consciousness. Denies any weakness or numbness. Denies any chest pain, SOB or palpitations. No fever, cough or chills. No hx of recent travel or sick contacts. At the time of EMS arrival patient was found to have POCBS of 50. EMS gave dextrose and on arrival to the ED patient blood sugar was found to be in 30's with hypothermia of 94.9.    ED course:   Vitals:   BP: 176/85  HR:76  Temp:94.2  RR:18, O2:96% on RA   Significant Labs:   CBC: WBC:16.82 Hb:13.2 , Platlets: 260, Neutro:89   CMP: Lytes: WNL, BUN/Creatinine:48/4.8, Glucose:134 , Alkaline Phos:128, AST, 41, eGFR: 9, HsTrop: 275.9, ProBNP: 145424, Lactate: 2  UA; negative  CXR: Heart and lung appear normal (self read)  CT BRAIN: No acute intracranial bleeding. Central volume loss, chronic microvascular ischemic changes, and chronic bilateral lacunar infarctions.  CT CERVICAL SPINE: No fracture. Grade 1 C4-C5 anterolisthesis. C6-C7 disc degeneration. Bilateral pleural effusions and interlobular septal thickening due to   interstitial edema.  EKG: NSR, left axis deviation, HR 71,   QT/QTc: 426/462  Patient received: Ceftriaxone 1 g x1, Dextrose 5% + NS 1L x1, Dextrose 50% IV X1, heating blanket, 2L NC   (16 Jun 2022 01:19)    REVIEW OF SYSTEMS:    Patient denied fever, chills, abdominal pain, nausea, vomiting, cough, shortness of breath, chest pain or palpitations    Vital Signs Last 24 Hrs  T(C): 36.6 (19 Jun 2022 12:09), Max: 36.8 (18 Jun 2022 20:40)  T(F): 97.8 (19 Jun 2022 12:09), Max: 98.2 (18 Jun 2022 20:40)  HR: 72 (19 Jun 2022 13:28) (68 - 74)  BP: 122/61 (19 Jun 2022 13:28) (122/61 - 148/76)  BP(mean): --  RR: 18 (19 Jun 2022 12:09) (16 - 18)  SpO2: 93% (19 Jun 2022 12:09) (91% - 96%)I&O's Summary    18 Jun 2022 07:01  -  19 Jun 2022 07:00  --------------------------------------------------------  IN: 290 mL / OUT: 0 mL / NET: 290 mL      PHYSICAL EXAM:  GENERAL: NAD,   HEENT: PERRL, +EOMI, anicteric, no [place]  NECK: Supple, No JVD   CHEST/LUNG: CTA bilaterally; Normal effort  HEART: S1S2 Normal intensity, no murmurs, gallops or rubs noted  ABDOMEN: Soft, BS Normoactive, NT, ND, no HSM noted  EXTREMITIES:  Right hallux dry gangrene.  No edema noted, Limited mobility   SKIN: right big toe dry gangrene   NEURO: Lethargy, no focal deficits noted, CN II-XII intact  PSYCH: Depressed mood and affect; insight/judgement appropriate  LABS:                        11.4   9.99  )-----------( 232      ( 19 Jun 2022 07:18 )             36.0     06-19    138  |  97  |  18  ----------------------------<  130<H>  4.0   |  32<H>  |  3.30<H>    Ca    8.7      19 Jun 2022 07:18    TPro  6.1  /  Alb  2.6<L>  /  TBili  0.5  /  DBili  x   /  AST  18  /  ALT  17  /  AlkPhos  100  06-19        RADIOLOGY & ADDITIONAL TESTS:    MEDICATIONS:  MEDICATIONS  (STANDING):  amLODIPine   Tablet 5 milliGRAM(s) Oral daily  aspirin enteric coated 81 milliGRAM(s) Oral daily  atorvastatin 40 milliGRAM(s) Oral at bedtime  calcium acetate 667 milliGRAM(s) Oral three times a day with meals  cefTRIAXone   IVPB 1000 milliGRAM(s) IV Intermittent every 24 hours  cloNIDine 0.1 milliGRAM(s) Oral two times a day  clopidogrel Tablet 75 milliGRAM(s) Oral daily  dextrose 5%. 1000 milliLiter(s) (100 mL/Hr) IV Continuous <Continuous>  dextrose 5%. 1000 milliLiter(s) (50 mL/Hr) IV Continuous <Continuous>  dextrose 50% Injectable 25 Gram(s) IV Push once  dextrose 50% Injectable 12.5 Gram(s) IV Push once  dextrose 50% Injectable 25 Gram(s) IV Push once  diltiazem    Tablet 60 milliGRAM(s) Oral every 8 hours  glucagon  Injectable 1 milliGRAM(s) IntraMuscular once  heparin   Injectable 5000 Unit(s) SubCutaneous every 12 hours  imipramine 50 milliGRAM(s) Oral daily  insulin glargine Injectable (LANTUS) 8 Unit(s) SubCutaneous at bedtime  insulin lispro (ADMELOG) corrective regimen sliding scale   SubCutaneous three times a day before meals  insulin lispro (ADMELOG) corrective regimen sliding scale   SubCutaneous at bedtime  metoprolol tartrate 100 milliGRAM(s) Oral every 12 hours  pantoprazole    Tablet 40 milliGRAM(s) Oral before breakfast  povidone iodine 10% Solution 1 Application(s) Topical daily    MEDICATIONS  (PRN):  acetaminophen     Tablet .. 650 milliGRAM(s) Oral every 6 hours PRN Temp greater or equal to 38C (100.4F), Mild Pain (1 - 3)  aluminum hydroxide/magnesium hydroxide/simethicone Suspension 30 milliLiter(s) Oral every 4 hours PRN Dyspepsia  dextrose Oral Gel 15 Gram(s) Oral once PRN Blood Glucose LESS THAN 70 milliGRAM(s)/deciliter  diphenhydrAMINE Injectable 25 milliGRAM(s) IV Push <User Schedule> PRN Combative behavior  melatonin 3 milliGRAM(s) Oral at bedtime PRN Insomnia  ondansetron Injectable 4 milliGRAM(s) IV Push every 8 hours PRN Nausea and/or Vomiting

## 2022-06-19 NOTE — PROGRESS NOTE ADULT - SUBJECTIVE AND OBJECTIVE BOX
Patient is a 71y Female whom presented to the hospital with esrd on hd     PAST MEDICAL & SURGICAL HISTORY:  Diabetes mellitus II      HTN (hypertension)      h/o Anxiety attack      Depression      h/o Myocardial infarct 2007      CAD (coronary artery disease)      h/o Hepatitis A 1969  currently resolved, no symptoms      PAD (peripheral artery disease)      Murmur, cardiac      h/o Smoking  quitted 3/2012      CRF (chronic renal failure), unspecified stage      Dialysis patient      Anemia secondary to renal failure      HTN (hypertension)      coronary stent 2007      s/p Ovarian cyst removal      s/p surgical removal of benign Skin lesion epigastric area          MEDICATIONS  (STANDING):  amLODIPine   Tablet 5 milliGRAM(s) Oral daily  aspirin enteric coated 81 milliGRAM(s) Oral daily  atorvastatin 40 milliGRAM(s) Oral at bedtime  calcium acetate 667 milliGRAM(s) Oral three times a day with meals  cefTRIAXone   IVPB 1000 milliGRAM(s) IV Intermittent every 24 hours  cloNIDine 0.1 milliGRAM(s) Oral two times a day  clopidogrel Tablet 75 milliGRAM(s) Oral daily  dextrose 5%. 1000 milliLiter(s) (100 mL/Hr) IV Continuous <Continuous>  dextrose 5%. 1000 milliLiter(s) (50 mL/Hr) IV Continuous <Continuous>  dextrose 50% Injectable 25 Gram(s) IV Push once  dextrose 50% Injectable 12.5 Gram(s) IV Push once  dextrose 50% Injectable 25 Gram(s) IV Push once  diltiazem    Tablet 60 milliGRAM(s) Oral every 8 hours  glucagon  Injectable 1 milliGRAM(s) IntraMuscular once  heparin   Injectable 5000 Unit(s) SubCutaneous every 12 hours  imipramine 50 milliGRAM(s) Oral daily  insulin glargine Injectable (LANTUS) 12 Unit(s) SubCutaneous at bedtime  insulin lispro (ADMELOG) corrective regimen sliding scale   SubCutaneous three times a day before meals  insulin lispro (ADMELOG) corrective regimen sliding scale   SubCutaneous at bedtime  metoprolol tartrate 100 milliGRAM(s) Oral every 12 hours  pantoprazole    Tablet 40 milliGRAM(s) Oral before breakfast  povidone iodine 10% Solution 1 Application(s) Topical daily      Allergies    latex (Unknown)  No Known Drug Allergies    Intolerances        SOCIAL HISTORY:  Denies ETOh,Smoking,     FAMILY HISTORY:  No pertinent family history in first degree relatives        REVIEW OF SYSTEMS:    CONSTITUTIONAL: No weakness, fevers or chills  RESPIRATORY: No cough, wheezing, hemoptysis; No shortness of breath  CARDIOVASCULAR: No chest pain or palpitations  GASTROINTESTINAL: No abdominal or epigastric pain. No nausea, vomiting,     No diarrhea or constipation. No melena   SKIN: dry                                                                        11.4   9.99  )-----------( 232      ( 19 Jun 2022 07:18 )             36.0       CBC Full  -  ( 19 Jun 2022 07:18 )  WBC Count : 9.99 K/uL  RBC Count : 3.94 M/uL  Hemoglobin : 11.4 g/dL  Hematocrit : 36.0 %  Platelet Count - Automated : 232 K/uL  Mean Cell Volume : 91.4 fl  Mean Cell Hemoglobin : 28.9 pg  Mean Cell Hemoglobin Concentration : 31.7 gm/dL  Auto Neutrophil # : x  Auto Lymphocyte # : x  Auto Monocyte # : x  Auto Eosinophil # : x  Auto Basophil # : x  Auto Neutrophil % : x  Auto Lymphocyte % : x  Auto Monocyte % : x  Auto Eosinophil % : x  Auto Basophil % : x      06-19    138  |  97  |  18  ----------------------------<  130<H>  4.0   |  32<H>  |  3.30<H>    Ca    8.7      19 Jun 2022 07:18    TPro  6.1  /  Alb  2.6<L>  /  TBili  0.5  /  DBili  x   /  AST  18  /  ALT  17  /  AlkPhos  100  06-19      CAPILLARY BLOOD GLUCOSE      POCT Blood Glucose.: 242 mg/dL (19 Jun 2022 16:28)  POCT Blood Glucose.: 132 mg/dL (19 Jun 2022 11:20)  POCT Blood Glucose.: 151 mg/dL (19 Jun 2022 07:31)  POCT Blood Glucose.: 225 mg/dL (18 Jun 2022 22:19)      Vital Signs Last 24 Hrs  T(C): 36.5 (19 Jun 2022 17:21), Max: 36.8 (18 Jun 2022 20:40)  T(F): 97.7 (19 Jun 2022 17:21), Max: 98.2 (18 Jun 2022 20:40)  HR: 70 (19 Jun 2022 17:21) (70 - 74)  BP: 135/69 (19 Jun 2022 17:21) (122/61 - 148/76)  BP(mean): --  RR: 18 (19 Jun 2022 17:21) (16 - 18)  SpO2: 92% (19 Jun 2022 17:21) (91% - 95%)                PHYSICAL EXAM:    Constitutional: NAD  HEENT: conjunctive   clear   Neck:  No JVD  Respiratory: CTAB  Cardiovascular: S1 and S2  Gastrointestinal: BS+, soft, NT/ND  Extremities: No peripheral edema  , pos toe bandage   : No [place]  Skin: No rashes  Access: pos fistula

## 2022-06-19 NOTE — PROGRESS NOTE ADULT - SUBJECTIVE AND OBJECTIVE BOX
[facility]  INFECTIOUS DISEASES   [address] [place] [address]  Tel: [phone]     Fax: [phone]  ======================================================  MD Zita Alejandra Kaushal, MD Cho, Michelle, MD   ======================================================    [id number]  SHILO KOHLER     Follow up: R foot wound/gangrene     No new changes, no pain in foot, no fever.      PAST MEDICAL & SURGICAL HISTORY:  Diabetes mellitus II  HTN (hypertension)  h/o Anxiety attack  Depression  h/o Myocardial infarct 2007  CAD (coronary artery disease)  h/o Hepatitis A 1969  currently resolved, no symptoms  PAD (peripheral artery disease)  Murmur, cardiac  h/o Smoking  quitted 3/2012  CRF (chronic renal failure), unspecified stage  Dialysis patient  Anemia secondary to renal failure  HTN (hypertension)  coronary stent 2007  s/p Ovarian cyst removal  s/p surgical removal of benign Skin lesion epigastric area    Social Hx: no current smoking, ETOH or drugs     FAMILY HISTORY:  No pertinent family history in first degree relatives    Allergies  latex (Unknown)  No Known Drug Allergies    Antibiotics:  zosyn     REVIEW OF SYSTEMS:  CONSTITUTIONAL:  No Fever or chills  HEENT:  No diplopia or blurred vision.  No sore throat or runny nose.  CARDIOVASCULAR:  No chest pain or SOB.  RESPIRATORY:  No cough, shortness of breath, PND or orthopnea.  GASTROINTESTINAL:  No nausea, vomiting or diarrhea.  GENITOURINARY:  No dysuria, frequency or urgency. No Blood in urine  MUSCULOSKELETAL:  no joint aches, no muscle pain  SKIN:  R foot wound   NEUROLOGIC:  No paresthesias, fasciculations, seizures or weakness.  PSYCHIATRIC:  No disorder of thought or mood.  ENDOCRINE:  No heat or cold intolerance, polyuria or polydipsia.  HEMATOLOGICAL:  No easy bruising or bleeding.     Physical Exam:  Vital Signs Last 24 Hrs  T(C): 36.5 (19 Jun 2022 17:21), Max: 36.8 (18 Jun 2022 20:40)  T(F): 97.7 (19 Jun 2022 17:21), Max: 98.2 (18 Jun 2022 20:40)  HR: 70 (19 Jun 2022 17:21) (70 - 74)  BP: 135/69 (19 Jun 2022 17:21) (122/61 - 148/76)  BP(mean): --  RR: 18 (19 Jun 2022 17:21) (16 - 18)  SpO2: 92% (19 Jun 2022 17:21) (91% - 93%)  GEN: NAD  HEENT: normocephalic and atraumatic. EOMI. PERRL.    NECK: Supple.  No lymphadenopathy   LUNGS: Clear to auscultation.  HEART: Irregular rate and rhythm   ABDOMEN: Soft, nontender, and nondistended.  Positive bowel sounds.    : No CVA tenderness  EXTREMITIES: R hallux with an ulcer, looks gangrenous with superimposed infection   has some bloody discharge. R heel ulcer very superficial,   left medial malleolus small ulcer minimal serous discharge   NEUROLOGIC: grossly intact.  PSYCHIATRIC: Appropriate affect .  SKIN: No rash       Labs:                        11.4   9.99  )-----------( 232      ( 19 Jun 2022 07:18 )             36.0     06-19    138  |  97  |  18  ----------------------------<  130<H>  4.0   |  32<H>  |  3.30<H>    Ca    8.7      19 Jun 2022 07:18    TPro  6.1  /  Alb  2.6<L>  /  TBili  0.5  /  DBili  x   /  AST  18  /  ALT  17  /  AlkPhos  100  06-19    Culture - Urine (collected 06-16-22 @ 03:40)  Source: Clean Catch Clean Catch (Midstream)  Final Report (06-17-22 @ 07:32):    <10,000 CFU/mL Normal Urogenital Shantel    Culture - Blood (collected 06-16-22 @ 03:38)  Source: .Blood Blood-Peripheral    Culture - Blood (collected 06-16-22 @ 03:38)  Source: .Blood Blood-Peripheral    WBC Count: 9.99 K/uL (06-19-22 @ 07:18)  WBC Count: 10.73 K/uL (06-18-22 @ 07:54)  WBC Count: 12.79 K/uL (06-17-22 @ 07:52)  WBC Count: 16.09 K/uL (06-16-22 @ 11:57)  WBC Count: 16.82 K/uL (06-15-22 @ 21:13)    Creatinine, Serum: 3.30 mg/dL (06-19-22 @ 07:18)  Creatinine, Serum: 4.90 mg/dL (06-18-22 @ 07:54)  Creatinine, Serum: 3.60 mg/dL (06-17-22 @ 07:52)  Creatinine, Serum: 5.40 mg/dL (06-16-22 @ 11:57)  Creatinine, Serum: 4.80 mg/dL (06-15-22 @ 21:13)    C-Reactive Protein, Serum: 19 mg/L (06-17-22 @ 10:38)    Sedimentation Rate, Erythrocyte: 13 mm/hr (06-17-22 @ 07:52)    SARS-CoV-2: NotDetec (06-15-22 @ 22:44)    All imaging and other data have been reviewed.  < from: CT Chest No Cont (06.16.22 @ 08:16) >  IMPRESSION:  Small layering bilateral pleural effusions decreased from 4/6/2022.  7 mm groundglass nodule within left lower lobe (series 2 image 44).   Recommend follow-up chest CT in 12 months to determine stability.    Assessment and Plan:   72 yo woman with PMH of HTN, DM2, CAD, CHF, A.fib, multiple falls and ESRD on HD was admitted on 6/16, came to ED with generalized weakness.   Her hypoglycemia and hypothermia on admission could be related to sepsis/SIRS source unclear at this time, could be foot infection? Left hallux looks like  a dry gangrene with superimposed infection.   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 She had Tissue cultures from 3/30/22 grew   klebsiella oxytoca/raoutella oxytoca, E. coli, MSSA so Cefazolin was given after HD to complete the course of treatment.   ESR 13 and CRP 19    Recommendations:  - Blood culture x 2 NGTD  - leukocytosis normalized from 16k to 9k  - Vascular surgery follow up noted, for angiogram and possibly bypass on 6/21   - Podiatry follow up noted for possible amputation on 6/21 if patient agrees(refused in the past)  - Will continue ceftriaxone based on old culture  - MRSA PCR negative     Will follow.    Geovany Long MD  Division of Infectious Diseases   Please call ID service at [phone] with any question.      35 minutes spent on total encounter assessing patient, examination, chart reivew, counseling and coordinating care by the attending physician/nurse/care manager.

## 2022-06-20 ENCOUNTER — TRANSCRIPTION ENCOUNTER (OUTPATIENT)
Age: 72
End: 2022-06-20

## 2022-06-20 LAB
ALBUMIN SERPL ELPH-MCNC: 2.6 G/DL — LOW (ref 3.3–5)
ALP SERPL-CCNC: 101 U/L — SIGNIFICANT CHANGE UP (ref 40–120)
ALT FLD-CCNC: 17 U/L — SIGNIFICANT CHANGE UP (ref 12–78)
ANION GAP SERPL CALC-SCNC: 9 MMOL/L — SIGNIFICANT CHANGE UP (ref 5–17)
APTT BLD: 29.8 SEC — SIGNIFICANT CHANGE UP (ref 27.5–35.5)
AST SERPL-CCNC: 18 U/L — SIGNIFICANT CHANGE UP (ref 15–37)
BILIRUB SERPL-MCNC: 0.4 MG/DL — SIGNIFICANT CHANGE UP (ref 0.2–1.2)
BUN SERPL-MCNC: 32 MG/DL — HIGH (ref 7–23)
CALCIUM SERPL-MCNC: 9.1 MG/DL — SIGNIFICANT CHANGE UP (ref 8.5–10.1)
CHLORIDE SERPL-SCNC: 96 MMOL/L — SIGNIFICANT CHANGE UP (ref 96–108)
CO2 SERPL-SCNC: 32 MMOL/L — HIGH (ref 22–31)
CREAT SERPL-MCNC: 4.8 MG/DL — HIGH (ref 0.5–1.3)
EGFR: 9 ML/MIN/1.73M2 — LOW
GLUCOSE SERPL-MCNC: 163 MG/DL — HIGH (ref 70–99)
HCT VFR BLD CALC: 37.1 % — SIGNIFICANT CHANGE UP (ref 34.5–45)
HGB BLD-MCNC: 11.4 G/DL — LOW (ref 11.5–15.5)
INR BLD: 0.97 RATIO — SIGNIFICANT CHANGE UP (ref 0.88–1.16)
MCHC RBC-ENTMCNC: 28.4 PG — SIGNIFICANT CHANGE UP (ref 27–34)
MCHC RBC-ENTMCNC: 30.7 GM/DL — LOW (ref 32–36)
MCV RBC AUTO: 92.5 FL — SIGNIFICANT CHANGE UP (ref 80–100)
NRBC # BLD: 0 /100 WBCS — SIGNIFICANT CHANGE UP (ref 0–0)
PLATELET # BLD AUTO: 274 K/UL — SIGNIFICANT CHANGE UP (ref 150–400)
POTASSIUM SERPL-MCNC: 4.9 MMOL/L — SIGNIFICANT CHANGE UP (ref 3.5–5.3)
POTASSIUM SERPL-SCNC: 4.9 MMOL/L — SIGNIFICANT CHANGE UP (ref 3.5–5.3)
PROT SERPL-MCNC: 6.4 G/DL — SIGNIFICANT CHANGE UP (ref 6–8.3)
PROTHROM AB SERPL-ACNC: 11.4 SEC — SIGNIFICANT CHANGE UP (ref 10.5–13.4)
RBC # BLD: 4.01 M/UL — SIGNIFICANT CHANGE UP (ref 3.8–5.2)
RBC # FLD: 17.3 % — HIGH (ref 10.3–14.5)
SARS-COV-2 RNA SPEC QL NAA+PROBE: SIGNIFICANT CHANGE UP
SODIUM SERPL-SCNC: 137 MMOL/L — SIGNIFICANT CHANGE UP (ref 135–145)
WBC # BLD: 9.64 K/UL — SIGNIFICANT CHANGE UP (ref 3.8–10.5)
WBC # FLD AUTO: 9.64 K/UL — SIGNIFICANT CHANGE UP (ref 3.8–10.5)

## 2022-06-20 PROCEDURE — 99232 SBSQ HOSP IP/OBS MODERATE 35: CPT

## 2022-06-20 PROCEDURE — 99233 SBSQ HOSP IP/OBS HIGH 50: CPT

## 2022-06-20 RX ORDER — CEFAZOLIN SODIUM 1 G
1000 VIAL (EA) INJECTION ONCE
Refills: 0 | Status: COMPLETED | OUTPATIENT
Start: 2022-06-21 | End: 2022-06-21

## 2022-06-20 RX ORDER — SODIUM CHLORIDE 9 MG/ML
1000 INJECTION INTRAMUSCULAR; INTRAVENOUS; SUBCUTANEOUS
Refills: 0 | Status: DISCONTINUED | OUTPATIENT
Start: 2022-06-21 | End: 2022-06-21

## 2022-06-20 RX ADMIN — PANTOPRAZOLE SODIUM 40 MILLIGRAM(S): 20 TABLET, DELAYED RELEASE ORAL at 06:14

## 2022-06-20 RX ADMIN — Medication 667 MILLIGRAM(S): at 13:35

## 2022-06-20 RX ADMIN — Medication 60 MILLIGRAM(S): at 13:35

## 2022-06-20 RX ADMIN — INSULIN GLARGINE 8 UNIT(S): 100 INJECTION, SOLUTION SUBCUTANEOUS at 21:18

## 2022-06-20 RX ADMIN — AMLODIPINE BESYLATE 5 MILLIGRAM(S): 2.5 TABLET ORAL at 06:14

## 2022-06-20 RX ADMIN — Medication 0.1 MILLIGRAM(S): at 06:14

## 2022-06-20 RX ADMIN — Medication 50 MILLIGRAM(S): at 11:31

## 2022-06-20 RX ADMIN — CLOPIDOGREL BISULFATE 75 MILLIGRAM(S): 75 TABLET, FILM COATED ORAL at 11:31

## 2022-06-20 RX ADMIN — Medication 100 MILLIGRAM(S): at 06:14

## 2022-06-20 RX ADMIN — Medication 0.1 MILLIGRAM(S): at 21:18

## 2022-06-20 RX ADMIN — Medication 60 MILLIGRAM(S): at 21:18

## 2022-06-20 RX ADMIN — Medication 60 MILLIGRAM(S): at 06:14

## 2022-06-20 RX ADMIN — ATORVASTATIN CALCIUM 40 MILLIGRAM(S): 80 TABLET, FILM COATED ORAL at 21:18

## 2022-06-20 RX ADMIN — Medication 100 MILLIGRAM(S): at 21:18

## 2022-06-20 RX ADMIN — Medication 1: at 07:38

## 2022-06-20 RX ADMIN — Medication 667 MILLIGRAM(S): at 07:38

## 2022-06-20 RX ADMIN — Medication 1 APPLICATION(S): at 11:31

## 2022-06-20 RX ADMIN — HEPARIN SODIUM 5000 UNIT(S): 5000 INJECTION INTRAVENOUS; SUBCUTANEOUS at 21:48

## 2022-06-20 RX ADMIN — Medication 667 MILLIGRAM(S): at 21:18

## 2022-06-20 RX ADMIN — CEFTRIAXONE 100 MILLIGRAM(S): 500 INJECTION, POWDER, FOR SOLUTION INTRAMUSCULAR; INTRAVENOUS at 13:36

## 2022-06-20 RX ADMIN — HEPARIN SODIUM 5000 UNIT(S): 5000 INJECTION INTRAVENOUS; SUBCUTANEOUS at 06:14

## 2022-06-20 RX ADMIN — Medication 81 MILLIGRAM(S): at 11:31

## 2022-06-20 NOTE — PROGRESS NOTE ADULT - SUBJECTIVE AND OBJECTIVE BOX
Patient is a 71y old  Female who presents with a chief complaint of SIRS (20 Jun 2022 08:26)  Vascular surgery consulted for R hallux gangrene and diminished pulses  No acute events overnight. Deneis fevers, chills, abd pain, SOB, CP or lower extremity pain  For RLE angio 6/21    MEDICATIONS  (STANDING):  amLODIPine   Tablet 5 milliGRAM(s) Oral daily  aspirin enteric coated 81 milliGRAM(s) Oral daily  atorvastatin 40 milliGRAM(s) Oral at bedtime  calcium acetate 667 milliGRAM(s) Oral three times a day with meals  cefTRIAXone   IVPB 1000 milliGRAM(s) IV Intermittent every 24 hours  cloNIDine 0.1 milliGRAM(s) Oral two times a day  clopidogrel Tablet 75 milliGRAM(s) Oral daily  dextrose 5%. 1000 milliLiter(s) (100 mL/Hr) IV Continuous <Continuous>  dextrose 5%. 1000 milliLiter(s) (50 mL/Hr) IV Continuous <Continuous>  dextrose 50% Injectable 25 Gram(s) IV Push once  dextrose 50% Injectable 12.5 Gram(s) IV Push once  dextrose 50% Injectable 25 Gram(s) IV Push once  diltiazem    Tablet 60 milliGRAM(s) Oral every 8 hours  glucagon  Injectable 1 milliGRAM(s) IntraMuscular once  heparin   Injectable 5000 Unit(s) SubCutaneous every 12 hours  imipramine 50 milliGRAM(s) Oral daily  insulin glargine Injectable (LANTUS) 8 Unit(s) SubCutaneous at bedtime  insulin lispro (ADMELOG) corrective regimen sliding scale   SubCutaneous three times a day before meals  insulin lispro (ADMELOG) corrective regimen sliding scale   SubCutaneous at bedtime  metoprolol tartrate 100 milliGRAM(s) Oral every 12 hours  pantoprazole    Tablet 40 milliGRAM(s) Oral before breakfast  povidone iodine 10% Solution 1 Application(s) Topical daily    MEDICATIONS  (PRN):  acetaminophen     Tablet .. 650 milliGRAM(s) Oral every 6 hours PRN Temp greater or equal to 38C (100.4F), Mild Pain (1 - 3)  aluminum hydroxide/magnesium hydroxide/simethicone Suspension 30 milliLiter(s) Oral every 4 hours PRN Dyspepsia  dextrose Oral Gel 15 Gram(s) Oral once PRN Blood Glucose LESS THAN 70 milliGRAM(s)/deciliter  diphenhydrAMINE Injectable 25 milliGRAM(s) IV Push <User Schedule> PRN Combative behavior  melatonin 3 milliGRAM(s) Oral at bedtime PRN Insomnia  ondansetron Injectable 4 milliGRAM(s) IV Push every 8 hours PRN Nausea and/or Vomiting    Allergies    latex (Unknown)  No Known Drug Allergies    Intolerances        Vital Signs Last 24 Hrs  T(C): 36.6 (20 Jun 2022 04:03), Max: 37.1 (19 Jun 2022 20:49)  T(F): 97.8 (20 Jun 2022 04:03), Max: 98.8 (19 Jun 2022 20:49)  HR: 65 (20 Jun 2022 06:11) (63 - 74)  BP: 150/72 (20 Jun 2022 06:11) (122/61 - 150/72)  BP(mean): --  RR: 18 (20 Jun 2022 04:03) (18 - 18)  SpO2: 92% (20 Jun 2022 04:03) (91% - 93%)  I&O's Detail    19 Jun 2022 07:01  -  20 Jun 2022 07:00  --------------------------------------------------------  IN:    IV PiggyBack: 50 mL    Oral Fluid: 390 mL  Total IN: 440 mL    OUT:  Total OUT: 0 mL    Total NET: 440 mL      Physical Exam:  General: NAD, resting comfortably in bed  Neuro: Oriented to self only, speech fluent, MU[place]AMM[place] X 4=  Pulmonary: Nonlabored breathing, no respiratory distress, diminished at base R>L  Cardiovascular: Normal S1, S2 with soft syst murmur  Abdominal: soft, NT/ND  Extremities: R great toe distal tip dry gangrene with macerated base and periwound erythema, +malodor, no crepitus. 2nd toe distal with dry gangrene. L inner maleolar with 2x2 cm circular wound with granulated base and hyperkeritotic edges. R groin incision well healed; L groin fem-BK pop incision well healed. R radiocephalic AVF + bruit, +thrill  Pulses:   Right:                                                                          Left:  FEM [ ]2+ [ ]1+ [ ]doppler                                           FEM [x ]2+ [ ]1+ [ ]doppler    POP [ ]2+ [ ]1+ [x ]doppler                                          POP [x ]2+ [ ]1+ [ ]doppler    DP [ ]2+ [ ]1+ [x ]doppler                                            DP [x ]2+ [ ]1+ [ ]doppler  PT[ ]2+ [ ]1+ [ x]doppler                                             PT [ x]2+ [ ]1+ [ ]doppler      LABS:                        11.4   9.64  )-----------( 274      ( 20 Jun 2022 07:04 )             37.1     06-20    137  |  96  |  32<H>  ----------------------------<  163<H>  4.9   |  32<H>  |  4.80<H>    Ca    9.1      20 Jun 2022 07:04    TPro  6.4  /  Alb  2.6<L>  /  TBili  0.4  /  DBili  x   /  AST  18  /  ALT  17  /  AlkPhos  101  06-20      CAPILLARY BLOOD GLUCOSE      POCT Blood Glucose.: 158 mg/dL (20 Jun 2022 07:33)  POCT Blood Glucose.: 194 mg/dL (19 Jun 2022 20:57)  POCT Blood Glucose.: 242 mg/dL (19 Jun 2022 16:28)  POCT Blood Glucose.: 132 mg/dL (19 Jun 2022 11:20)    Radiology and Additional Studies:    < from: [facility] Physiol Extremity Lower 3+ Level, RT (06.17.22 @ 10:43) >  ACC: [id number] EXAM:  PHYSIOL EXTREM LOW 3+ LEV RT                          PROCEDURE DATE:  06/17/2022          INTERPRETATION:  History: Right foot gangrene with diminished pulses.    Evaluation may be affected by vessel calcification and patient movement   as reported by technologist.    Left brachial pressure is 120 mmHg. Right brachial pressure could not be   obtained due to presence of AV fistula. Additional segmental pressures   could not be obtained due to limitations of patient movementand vessel   noncompressibility. As such, right TOMMY an TBI cannot be calculated.    Diminished amplitude of pulse volume recordings, progressively worse   towards the distal right lower extremity along the ankle and metatarsal   levels. Nonpulsatile waveform of the right digital level. Findings may   reflect right lower extremity arterial disease, though nonlocalized based   on this study.    Impression:    Limited study.    TOMMY and TBI could not be obtained due to vessel noncompressibility.    Diminished amplitude of pulse volume recordings, progressively worse   towards the distal right lower extremity along the ankle and metatarsal   levels. Nonpulsatile waveform of the right digital level. Findings may   reflect right lower extremity arterial disease, though nonlocalized based   on this study. Correlate with arterial duplex ultrasound or CTA with   lower extremity runoff for characterization.    Given the history of right foot gangrene, correlate for any clinical   signs and symptoms of acute lower extremity ischemia.    < from: CT Angio Abd Aorta w/run-off w/ IV Cont (04.08.22 @ 09:49) >  ACC: [id number] EXAM:  CT ANGIO ABD AOR W RUN(W)AW IC                          PROCEDURE DATE:  04/08/2022          INTERPRETATION:  CLINICAL INFORMATION: Right lower extremity iliac   disease. Right lower extremity bypass graft.    COMPARISON: CT chest 12/12/2019 and CTA runoff 8/28/2012. Arterial   Doppler 4/6/2022.    CONTRAST/COMPLICATIONS:  IV Contrast: Omnipaque 350  90 cc administered   10 cc discarded  Oral Contrast: NONE  Complications: None reported at time of study completion    CT ANGIOGRAM ABDOMEN, PELVIS, AND LOWER EXTREMITIES:    PROCEDURE:  Initially, nonenhanced CT was obtained through the calves. Then,   following the rapid administration of intravenous contrast, CT   angiography was performed through the abdomen, pelvis, and lower   extremities down to the toes.  Delayed images through the calves were   also obtained. Sagittal and coronal reformats as well as 3D   reconstructions were performed.    FINDINGS:    CENTRAL ARTERIAL SYSTEM:    Abdominal aorta normal in caliber of mild to moderate calcified plaque.    Patent celiac artery with moderate narrowing at the origin. Patent   superior mesenteric artery with moderate to severe narrowing at the   origin. Moderate to severe plaque at the origin of both renal arteries.   Both renal arteries are small/attenuated. Inferior mesenteric artery is   patent.    Long segment occlusion of the right common iliac and external iliac   arteries with reconstitution just proximal to the common femoral artery.   Heavily calcified plaque at the origin of the right internal iliac artery   which is likely occluded with reconstitution distally.    Patent left common iliac artery with mild to moderate plaque. Patent left   external iliac artery with mild plaque. Left internal iliac artery is   patent with atherosclerotic changes.    RIGHT LOWER EXTREMITY:    Patent bifemoral bypass graft. The right common femoral artery. Extensive   atherosclerotic changes in the right superficial femoral artery with   multifocal high-grade stenoses or occlusions including proximally (series   4 image 358) and multiple foci distally including series 4 image 530).   Profunda femoral artery is patent with atherosclerotic changes    Multifocal severe narrowing of the popliteal artery.    Heavily calcified calf arteries which cannot be assessed secondary to the   extensive calcified plaque.    Multiple collaterals are noted in the thigh and calf.    LEFT LOWER EXTREMITY:    Patent common femoral artery. Extensive atherosclerotic changes in the   left superficial femoral artery with multifocal high-grade stenoses or   occlusions. Profunda femoral artery is patent with atherosclerotic   changes. Bypass graft from the left superficial femoral artery in the   thigh to tibioperoneal trunkwhich is patent. Severe multifocal   high-grade stenoses or occlusions of the popliteal artery.    High-grade stenosis of the tibioperoneal trunk distal to the takeoff of   the anterior tibial artery. Heavily calcified calf arteries limiting   evaluation. Peroneal artery appears patent at the ankle; posterior tibial   and anterior tibial arteries cannot be assessed secondary to the   calcified plaque.    Multiple collaterals are noted in the thigh and calf.    ADDITIONAL FINDINGS:    FINDINGS:  LOWER CHEST: Small bilateral pleural effusions with compressive   atelectasis in the lower lobes. Coronary artery calcifications.    LIVER: Calcified granuloma at the inferior aspect right hepatic lobe,   otherwise unremarkable.  BILE DUCTS: Normal caliber.  GALLBLADDER: Within normal limits.  SPLEEN: Within normal limits.  PANCREAS: Within normal limits.  ADRENALS: Within normal limits.  KIDNEYS/URETERS: 1.5 cm left adrenal nodule, not significantly changed.   Unremarkable right adrenal gland.    BLADDER: Unremarkable.  REPRODUCTIVE ORGANS: Uterus and adnexa are unremarkable.    BOWEL: Moderate amount retained fecal material in the colon, greatest in   the cecum. No evidence of a bowel obstruction. Appendix is normal.  PERITONEUM: No ascites.  RETROPERITONEUM/LYMPH NODES: No lymphadenopathy.  ABDOMINAL WALL: Unremarkable.  BONES: No aggressive osseous lesion. Degenerative changes in the spine.    IMPRESSION:    Patent bifemoral and left superficial femoral to tibioperoneal trunk   bypass grafts.    Long segment occlusion of the right common and external iliac arteries   with reconstitution at the right superficial femoral artery.    Severe atherosclerotic changes involving the superficial femoral and   popliteal arteries bilaterally with multifocal high-grade stenoses or   occlusions.    Heavily calcified calf arteries which cannot be assessed secondary to the   calcified plaque.    Given the degree of calcified plaque, an MRA may be able to better   evaluate the lower extremity arteries.      < end of copied text >

## 2022-06-20 NOTE — PROGRESS NOTE ADULT - SUBJECTIVE AND OBJECTIVE BOX
CAPILLARY BLOOD GLUCOSE      POCT Blood Glucose.: 158 mg/dL (20 Jun 2022 07:33)  POCT Blood Glucose.: 194 mg/dL (19 Jun 2022 20:57)  POCT Blood Glucose.: 242 mg/dL (19 Jun 2022 16:28)  POCT Blood Glucose.: 132 mg/dL (19 Jun 2022 11:20)      Vital Signs Last 24 Hrs  T(C): 36.6 (20 Jun 2022 04:03), Max: 37.1 (19 Jun 2022 20:49)  T(F): 97.8 (20 Jun 2022 04:03), Max: 98.8 (19 Jun 2022 20:49)  HR: 65 (20 Jun 2022 06:11) (63 - 74)  BP: 150/72 (20 Jun 2022 06:11) (122/61 - 150/72)  BP(mean): --  RR: 18 (20 Jun 2022 04:03) (18 - 18)  SpO2: 92% (20 Jun 2022 04:03) (91% - 93%)    General: WN/WD NAD  Respiratory: CTA B/L  CV: RRR, S1S2, no murmurs, rubs or gallops  Abdominal: Soft, NT, ND +BS, Last BM  Extremities: No edema, + peripheral pulses     06-20    137  |  96  |  32<H>  ----------------------------<  163<H>  4.9   |  32<H>  |  4.80<H>    Ca    9.1      20 Jun 2022 07:04    TPro  6.4  /  Alb  2.6<L>  /  TBili  0.4  /  DBili  x   /  AST  18  /  ALT  17  /  AlkPhos  101  06-20      atorvastatin 40 milliGRAM(s) Oral at bedtime  dextrose 50% Injectable 25 Gram(s) IV Push once  dextrose 50% Injectable 12.5 Gram(s) IV Push once  dextrose 50% Injectable 25 Gram(s) IV Push once  dextrose Oral Gel 15 Gram(s) Oral once PRN  glucagon  Injectable 1 milliGRAM(s) IntraMuscular once  insulin glargine Injectable (LANTUS) 8 Unit(s) SubCutaneous at bedtime  insulin lispro (ADMELOG) corrective regimen sliding scale   SubCutaneous three times a day before meals  insulin lispro (ADMELOG) corrective regimen sliding scale   SubCutaneous at bedtime

## 2022-06-20 NOTE — PROGRESS NOTE ADULT - SUBJECTIVE AND OBJECTIVE BOX
Patient is a 71y Female with a known history of :  SIRS (systemic inflammatory response syndrome) [R65.10]    Hypoglycemia [E16.2]    Pleural effusion [J90]    CHF, acute [I50.9]    Chronic CHF [I50.9]    Elevated troponin [R77.8]    Atrial fibrillation [I48.91]    Benign essential HTN [I10]    HLD (hyperlipidemia) [E78.5]    Depression, major [F32.9]    Need for prophylactic measure [Z29.9]    ESRD on hemodialysis [N18.6]    T2DM (type 2 diabetes mellitus) [E11.9]    HFrEF (heart failure with reduced ejection fraction) [I50.20]    Gangrene of toe of right foot [I96]      HPI:  Patient is a 71 year old female with PMH of A.fib rate controlled not on AC for hx of multiple falls, T2DM, HTN, HLD, ESRD on HD, CHF, s/p CABG brought in by EMS for generalized weakness at home. Patient states that she was doing well when in the afternoon she tried to get up from her couch and felt weak in the LE and fell back in her couch. Denies any hx of head trauma or loss of consciousness. Denies any weakness or numbness. Denies any chest pain, SOB or palpitations. No fever, cough or chills. No hx of recent travel or sick contacts. At the time of EMS arrival patient was found to have POCBS of 50. EMS gave dextrose and on arrival to the ED patient blood sugar was found to be in 30's with hypothermia of 94.9.    ED course:   Vitals:   BP: 176/85  HR:76  Temp:94.2  RR:18, O2:96% on RA   Significant Labs:   CBC: WBC:16.82 Hb:13.2 , Platlets: 260, Neutro:89   CMP: Lytes: WNL, BUN/Creatinine:48/4.8, Glucose:134 , Alkaline Phos:128, AST, 41, eGFR: 9, HsTrop: 275.9, ProBNP: [id number], Lactate: 2  UA; negative  CXR: Heart and lung appear normal (self read)  CT BRAIN: No acute intracranial bleeding. Central volume loss, chronic microvascular ischemic changes, and chronic bilateral lacunar infarctions.  CT CERVICAL SPINE: No fracture. Grade 1 C4-C5 anterolisthesis. C6-C7 disc degeneration. Bilateral pleural effusions and interlobular septal thickening due to   interstitial edema.  EKG: NSR, left axis deviation, HR 71,   QT/QTc: 426/462  Patient received: Ceftriaxone 1 g x1, Dextrose 5% + NS 1L x1, Dextrose 50% IV X1, heating blanket, 2L NC   (16 Jun 2022 01:19)      REVIEW OF SYSTEMS:    CONSTITUTIONAL: No fever, weight loss, or fatigue  EYES: No eye pain, visual disturbances, or discharge  ENMT:  No difficulty hearing, tinnitus, vertigo; No sinus or throat pain  NECK: No pain or stiffness  BREASTS: No pain, masses, or nipple discharge  RESPIRATORY: No cough, wheezing, chills or hemoptysis; No shortness of breath  CARDIOVASCULAR: No chest pain, palpitations, dizziness, or leg swelling  GASTROINTESTINAL: No abdominal or epigastric pain. No nausea, vomiting, or hematemesis; No diarrhea or constipation. No melena or hematochezia.  GENITOURINARY: No dysuria, frequency, hematuria, or incontinence  NEUROLOGICAL: No headaches, memory loss, loss of strength, numbness, or tremors  SKIN: No itching, burning, rashes, or lesions   LYMPH NODES: No enlarged glands  ENDOCRINE: No heat or cold intolerance; No hair loss  MUSCULOSKELETAL: No joint pain or swelling; No muscle, back, or extremity pain  PSYCHIATRIC: No depression, anxiety, mood swings, or difficulty sleeping  HEME/LYMPH: No easy bruising, or bleeding gums  ALLERGY AND IMMUNOLOGIC: No hives or eczema    MEDICATIONS  (STANDING):  amLODIPine   Tablet 5 milliGRAM(s) Oral daily  aspirin enteric coated 81 milliGRAM(s) Oral daily  atorvastatin 40 milliGRAM(s) Oral at bedtime  calcium acetate 667 milliGRAM(s) Oral three times a day with meals  cefTRIAXone   IVPB 1000 milliGRAM(s) IV Intermittent every 24 hours  cloNIDine 0.1 milliGRAM(s) Oral two times a day  clopidogrel Tablet 75 milliGRAM(s) Oral daily  dextrose 5%. 1000 milliLiter(s) (100 mL/Hr) IV Continuous <Continuous>  dextrose 5%. 1000 milliLiter(s) (50 mL/Hr) IV Continuous <Continuous>  dextrose 50% Injectable 25 Gram(s) IV Push once  dextrose 50% Injectable 12.5 Gram(s) IV Push once  dextrose 50% Injectable 25 Gram(s) IV Push once  diltiazem    Tablet 60 milliGRAM(s) Oral every 8 hours  glucagon  Injectable 1 milliGRAM(s) IntraMuscular once  heparin   Injectable 5000 Unit(s) SubCutaneous every 12 hours  imipramine 50 milliGRAM(s) Oral daily  insulin glargine Injectable (LANTUS) 8 Unit(s) SubCutaneous at bedtime  insulin lispro (ADMELOG) corrective regimen sliding scale   SubCutaneous three times a day before meals  insulin lispro (ADMELOG) corrective regimen sliding scale   SubCutaneous at bedtime  metoprolol tartrate 100 milliGRAM(s) Oral every 12 hours  pantoprazole    Tablet 40 milliGRAM(s) Oral before breakfast  povidone iodine 10% Solution 1 Application(s) Topical daily    MEDICATIONS  (PRN):  acetaminophen     Tablet .. 650 milliGRAM(s) Oral every 6 hours PRN Temp greater or equal to 38C (100.4F), Mild Pain (1 - 3)  aluminum hydroxide/magnesium hydroxide/simethicone Suspension 30 milliLiter(s) Oral every 4 hours PRN Dyspepsia  dextrose Oral Gel 15 Gram(s) Oral once PRN Blood Glucose LESS THAN 70 milliGRAM(s)/deciliter  diphenhydrAMINE Injectable 25 milliGRAM(s) IV Push <User Schedule> PRN Combative behavior  melatonin 3 milliGRAM(s) Oral at bedtime PRN Insomnia  ondansetron Injectable 4 milliGRAM(s) IV Push every 8 hours PRN Nausea and/or Vomiting      ALLERGIES: latex (Unknown)  No Known Drug Allergies      FAMILY HISTORY:  No pertinent family history in first degree relatives        PHYSICAL EXAMINATION:  -----------------------------  T(C): 36.6 (06-20-22 @ 04:03), Max: 37.1 (06-19-22 @ 20:49)  HR: 65 (06-20-22 @ 06:11) (63 - 74)  BP: 150/72 (06-20-22 @ 06:11) (122/61 - 150/72)  RR: 18 (06-20-22 @ 04:03) (18 - 18)  SpO2: 92% (06-20-22 @ 04:03) (91% - 93%)  Wt(kg): --    06-19 @ 07:01 - 06-20 @ 07:00  --------------------------------------------------------  IN:    IV PiggyBack: 50 mL    Oral Fluid: 390 mL  Total IN: 440 mL    OUT:  Total OUT: 0 mL    Total NET: 440 mL            VITALS  T(C): 36.6 (06-20-22 @ 04:03), Max: 37.1 (06-19-22 @ 20:49)  HR: 65 (06-20-22 @ 06:11) (63 - 74)  BP: 150/72 (06-20-22 @ 06:11) (122/61 - 150/72)  RR: 18 (06-20-22 @ 04:03) (18 - 18)  SpO2: 92% (06-20-22 @ 04:03) (91% - 93%)    Constitutional: well developed, normal appearance, well groomed, well nourished, no deformities and no acute distress.   Eyes: the conjunctiva exhibited no abnormalities and the eyelids demonstrated no xanthelasmas.   HEENT: normal oral mucosa, no oral pallor and no oral cyanosis.   Neck: normal jugular venous A waves present, normal jugular venous V waves present and no jugular venous wilson A waves.   Pulmonary: no respiratory distress, normal respiratory rhythm and effort, no accessory muscle use and lungs were clear to auscultation bilaterally.   Cardiovascular: heart rate and rhythm were normal, normal S1 and S2 and no murmur, gallop, rub, heave or thrill are present.   Abdomen: soft, non-tender, no hepato-splenomegaly and no abdominal mass palpated.   Musculoskeletal: the gait could not be assessed..   Extremities: no clubbing of the fingernails, no localized cyanosis, no petechial hemorrhages and no ischemic changes.   Skin: normal skin color and pigmentation, no rash, no venous stasis, no skin lesions, no skin ulcer and no xanthoma was observed.   Psychiatric: oriented to person, place, and time, the affect was normal, the mood was normal and not feeling anxious.     LABS:   --------  06-20    137  |  96  |  32<H>  ----------------------------<  163<H>  4.9   |  32<H>  |  4.80<H>    Ca    9.1      20 Jun 2022 07:04    TPro  6.4  /  Alb  2.6<L>  /  TBili  0.4  /  DBili  x   /  AST  18  /  ALT  17  /  AlkPhos  101  06-20                         11.4   9.64  )-----------( 274      ( 20 Jun 2022 07:04 )             37.1                 RADIOLOGY:  -----------------    ECG:     ECHO:

## 2022-06-20 NOTE — CHART NOTE - NSCHARTNOTEFT_GEN_A_CORE
Pt cleared by cardiology for vascular surgery  CTA reciewed with Dr Miller and will cancel Angio and proceed directly to operative revascularization  Discussed with Dr Barker    NPO p MN   T&S  HD today

## 2022-06-20 NOTE — PROGRESS NOTE ADULT - ASSESSMENT
Patient is a 71 year old female with PMH of A.fib rate controlled not on AC for hx of multiple falls, T2DM, HTN, HLD, ESRD on HD, CHF, s/p CABG brought in by EMS for generalized weakness at home. Patient states that she was doing well when in the afternoon she tried to get up from her couch and felt weak in the LE and fell back in her couch. Denies any hx of head trauma or loss of consciousness. Denies any weakness or numbness. Denies any chest pain, SOB or palpitations. No fever, cough or chills. No hx of recent travel or sick contacts. At the time of EMS arrival patient was found to have POCBS of 50. EMS gave dextrose and on arrival to the ED patient blood sugar was found to be in 30's with hypothermia of 94.9.    ED course:   Vitals:   BP: 176/85  HR:76  Temp:94.2  RR:18, O2:96% on RA   Significant Labs:   CBC: WBC:16.82 Hb:13.2 , Platlets: 260, Neutro:89   CMP: Lytes: WNL, BUN/Creatinine:48/4.8, Glucose:134 , Alkaline Phos:128, AST, 41, eGFR: 9, HsTrop: 275.9, ProBNP: 115846, Lactate: 2  UA; negative  CXR: Heart and lung appear normal (self read)  CT BRAIN: No acute intracranial bleeding. Central volume loss, chronic microvascular ischemic changes, and chronic bilateral lacunar infarctions.  CT CERVICAL SPINE: No fracture. Grade 1 C4-C5 anterolisthesis. C6-C7 disc degeneration. Bilateral pleural effusions and interlobular septal thickening due to   interstitial edema.  EKG: NSR, left axis deviation, HR 71,   QT/QTc: 426/462  Patient received: Ceftriaxone 1 g x1, Dextrose 5% + NS 1L x1, Dextrose 50% IV X1, heating blanket, 2L NC   (16 Jun 2022 01:19)      esrd on hd plan for hd today      ANEMIA PLAN:  Anemia of chronic disease:  We will continue epo  aiming for a HCT of 32-36 %.   We will monitor Iron stores, B12 and RBC folate .    BP monitoring,continue current antihypertensive meds, low salt diet    f/u  blood and urine cx,serial lactate levels,monitor vitals closley,ivfs hydration,monitor urine output and renal profile,iv abx

## 2022-06-20 NOTE — PROGRESS NOTE ADULT - ASSESSMENT
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 Lantus dec to 10 units qhs by endocrine. HD per nephro. Trops downtrended, was likely elevated from ESRD.    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 Lantus dec to 10 units qhs by endocrine. HD per nephro. Trops downtrended, was likely elevated from ESRD. will need cardiac clearance prior to any procedure or intervention.     Problem/Plan - 1:  ·  Problem: Hypoglycemia.  ·  Plan: - Patient presented with c/o generalized weakness and fall and was found to have blood sugar in 30's  - S/p Dextrose 50% IV X1 in the ED and D5 NS 1L X1 with appropriate response  - Patient with hx of T2DM, on Lantus 40 units qhs not on any oral hypoglycemics per outpatient pharmacy   [facility]  - endocrine consulted.     Problem/Plan - 2:  ·  Problem: SIRS (systemic inflammatory response syndrome).  ·  Plan: - Patient presented to the ED with the c/o Generalized weakness and possible fall and was found to have hypothermia 94.9 and leukocytosis of 16.82  - Patient meets SIRS criteria -   - UA unremarkable, no abdominal pain, CXR with no infiltrates seen on wet read   - S/P ceftriaxone in the ED - will monitor off Abx  - Patient was found to be hypoglycemic on arrival to the ED with POCS in 30's  - Hypothermia and leukocytosis likely reactive in the setting of hypoglycemia  - S/p Dextrose 50% IV X1 in the ED and D5 NS 1L X1 with appropriate response  - will monitor off Abx pending ID recommendations   - CT BRAIN: No acute intracranial bleeding. Central volume loss, chronic microvascular ischemic changes, and chronic bilateral lacunar infarctions.  - CT CERVICAL SPINE: No fracture. Grade 1 C4-C5 anterolisthesis. C6-C7 disc degeneration. Bilateral pleural effusions and interlobular septal thickening due to interstitial edema.  - CXR: no clear evidence of PNA  - F/U CT chest   - Trend WBC and monitor for fever  - F/u UA, UCx, BCx x2  - Lactate 2 on admission, f/u repeat lactate   - ID Dr. Tsai consulted, will appreciate recs.     Problem/Plan - 3:  ·  Problem: Pleural effusion.   ·  Plan: - No Hx of pulmonary disease  - Patient does not c/o cough, fever or chills  - CT Cervical shoes Bilateral pleural effusions and interlobular septal thickening due to interstitial edema.  - F/U dedicated CT chest  - s/p ceftriaxone in the ED - will monitor off Abx pending  ct results  - ID Dr. Tsai, consulted, will appreciate recs.     Problem/Plan - 4:  ·  Problem: HFrEF (heart failure with reduced ejection fraction).   ·  Plan: - patient with hx of HFrEF  - last ECHO in 04/22 shows EF of 45%  - Admission labs show ProBNP of [id number]  - Likely elevated in the setting of ESRD  - Consult Dr. James, will appreciate recs   - Avoid excessive fluids.     Problem/Plan - 5:  ·  Problem: Elevated troponin.   ·  Plan: - Admission labs show elevated Troponin of 275.9  - Patient denies any chest pain, sob or palpitations  - EKG shows NSR with left axis deviation with non specific ST and T waves changes  - elevated troponin likely due to ESRD  - trend x3 or to peak.     Problem/Plan - 6:  ·  Problem: ESRD on hemodialysis.   ·  Plan: - Patient with hx of ESRD  - On HD TTS schedule  - admission eGFR 9  - Follows Dr. Edwards as out patient, will consult Dr. Edwards, appreciate recs.     Problem/Plan - 7:  ·  Problem: T2DM (type 2 diabetes mellitus).   ·  Plan: - Chronic stable  - On Lantus 40 qhs  - Will start on lantus 20 units qhs with LDSS given hypoglycemic episode  - Accu checks AC/HS  - F/U A1C  - Adjust insulin requirement based on blood sugar levels  - per outpatient pharmacy patient recently rx ademolog however has not yet picked up and pharmacy unsure of dose   - endocrinology consulted, Dr. Perlman.     Problem/Plan - 8:  ·  Problem: Atrial fibrillation.   ·  Plan: - Patient with hx of P A.Fib  - Not on any AC because of hx of multiple falls   - on Lopressor 100mg q12 and Diltiazem 60mg q8 - will continue with hold parameters  - EKG shows NSR with left axis deviation with non specific ST and T waves changes.     Problem/Plan - 9:  ·  Problem: Benign essential HTN.   ·  Plan: - Chronic stable  - VSS  - Continue Amlodipine 5 mg with hold parameters  - Dash/Renal Diet  - continue to monitor routine hemodynamics.     Problem/Plan - 10:  ·  Problem: HLD (hyperlipidemia).   ·  Plan; - Chronic stable  - On atorvastatin 40mg at home - will continue  - Dash/Renal diet.     Problem/Plan - 11:  ·  Problem: Depression, major.   ·  Plan: - Chronic stable  - Continue imipramine 50qhs.     Problem/Plan - 12:  ·  Problem: Need for prophylactic measure.   ·  Plan: - DVT Prophylaxis: Heparin 5000 units q12. 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 Lantus dec to 10 units qhs by endocrine. HD per nephro. Trops downtrended, was likely elevated from ESRD.    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 Lantus dec to 10 units qhs by endocrine. HD per nephro. Trops downtrended, was likely elevated from ESRD. will need cardiac clearance prior to any procedure or intervention.    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 Lantus dec to 10 units qhs by endocrine. HD per nephro. Trops downtrended, was likely elevated from ESRD. will need cardiac clearance -- noted. Plan for RLE revascularization tomorrow w/ vasc team. NPO after MN, active T+S, blood products on hold, will have HD session today. Likely podiatric intervention Thurs or Fri reg amputation of great hallux.     Problem/Plan - 1:  ·  Problem: Hypoglycemia.  ·  Plan: - Patient presented with c/o generalized weakness and fall and was found to have blood sugar in 30's  - S/p Dextrose 50% IV X1 in the ED and D5 NS 1L X1 with appropriate response  - Patient with hx of T2DM, on Lantus 40 units qhs not on any oral hypoglycemics per outpatient pharmacy   - [facility] ac/hs  - endocrine consulted.     Problem/Plan - 2:  ·  Problem: SIRS (systemic inflammatory response syndrome).  ·  Plan: - Patient presented to the ED with the c/o Generalized weakness and possible fall and was found to have hypothermia 94.9 and leukocytosis of 16.82  - Patient meets SIRS criteria -   - UA unremarkable, no abdominal pain, CXR with no infiltrates seen on wet read   - S/P ceftriaxone in the ED - will monitor off Abx  - Patient was found to be hypoglycemic on arrival to the ED with POCS in 30's  - Hypothermia and leukocytosis likely reactive in the setting of hypoglycemia  - S/p Dextrose 50% IV X1 in the ED and D5 NS 1L X1 with appropriate response  - will monitor off Abx pending ID recommendations   - CT BRAIN: No acute intracranial bleeding. Central volume loss, chronic microvascular ischemic changes, and chronic bilateral lacunar infarctions.  - CT CERVICAL SPINE: No fracture. Grade 1 C4-C5 anterolisthesis. C6-C7 disc degeneration. Bilateral pleural effusions and interlobular septal thickening due to interstitial edema.  - CXR: no clear evidence of PNA  - F/U CT chest   - Trend WBC and monitor for fever  - F/u UA, UCx, BCx x2  - Lactate 2 on admission, f/u repeat lactate   - ID Dr. Tsai consulted, will appreciate recs.     Problem/Plan - 3:  ·  Problem: Pleural effusion.   ·  Plan: - No Hx of pulmonary disease  - Patient does not c/o cough, fever or chills  - CT Cervical shoes Bilateral pleural effusions and interlobular septal thickening due to interstitial edema.  - F/U dedicated CT chest  - s/p ceftriaxone in the ED - will monitor off Abx pending  ct results  - ID Dr. Tsai, consulted, will appreciate recs.     Problem/Plan - 4:  ·  Problem: HFrEF (heart failure with reduced ejection fraction).   ·  Plan: - patient with hx of HFrEF  - last ECHO in 04/22 shows EF of 45%  - Admission labs show ProBNP of [id number]  - Likely elevated in the setting of ESRD  - Consult Dr. James, will appreciate recs   - Avoid excessive fluids.     Problem/Plan - 5:  ·  Problem: Elevated troponin.   ·  Plan: - Admission labs show elevated Troponin of 275.9  - Patient denies any chest pain, sob or palpitations  - EKG shows NSR with left axis deviation with non specific ST and T waves changes  - elevated troponin likely due to ESRD  - trend x3 or to peak.     Problem/Plan - 6:  ·  Problem: ESRD on hemodialysis.   ·  Plan: - Patient with hx of ESRD  - On HD TTS schedule  - admission eGFR 9  - Follows Dr. Edwards as out patient, will consult Dr. Edwards, appreciate recs.     Problem/Plan - 7:  ·  Problem: T2DM (type 2 diabetes mellitus).   ·  Plan: - Chronic stable  - On Lantus 40 qhs  - Will start on lantus 20 units qhs with LDSS given hypoglycemic episode  - Accu checks AC/HS  - F/U A1C  - Adjust insulin requirement based on blood sugar levels  - per outpatient pharmacy patient recently rx ademolog however has not yet picked up and pharmacy unsure of dose   - endocrinology consulted, Dr. Perlman.     Problem/Plan - 8:  ·  Problem: Atrial fibrillation.   ·  Plan: - Patient with hx of P A.Fib  - Not on any AC because of hx of multiple falls   - on Lopressor 100mg q12 and Diltiazem 60mg q8 - will continue with hold parameters  - EKG shows NSR with left axis deviation with non specific ST and T waves changes.     Problem/Plan - 9:  ·  Problem: Benign essential HTN.   ·  Plan: - Chronic stable  - VSS  - Continue Amlodipine 5 mg with hold parameters  - Dash/Renal Diet  - continue to monitor routine hemodynamics.     Problem/Plan - 10:  ·  Problem: HLD (hyperlipidemia).   ·  Plan; - Chronic stable  - On atorvastatin 40mg at home - will continue  - Dash/Renal diet.     Problem/Plan - 11:  ·  Problem: Depression, major.   ·  Plan: - Chronic stable  - Continue imipramine 50qhs.     Problem/Plan - 12:  ·  Problem: Need for prophylactic measure.   ·  Plan: - DVT Prophylaxis: Heparin 5000 units q12. 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 Lantus dec to 10 units qhs by endocrine. HD per nephro. Trops downtrended, was likely elevated from ESRD.    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 Lantus dec to 10 units qhs by endocrine. HD per nephro. Trops downtrended, was likely elevated from ESRD. will need cardiac clearance prior to any procedure or intervention.    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 Lantus dec to 10 units qhs by endocrine. HD per nephro. Trops downtrended, was likely elevated from ESRD. will need cardiac clearance -- noted. Plan for RLE revascularization tomorrow w/ vasc team. NPO after MN, active T+S, blood products on hold, will have HD session today. Likely podiatric intervention Thurs or Fri reg amputation of great hallux. PLAVIX HELD, RESUME POST PROCEDURE IF cleared by VASC     Problem/Plan - 1:  ·  Problem: Hypoglycemia.  ·  Plan: - Patient presented with c/o generalized weakness and fall and was found to have blood sugar in 30's  - S/p Dextrose 50% IV X1 in the ED and D5 NS 1L X1 with appropriate response  - Patient with hx of T2DM, on Lantus 40 units qhs not on any oral hypoglycemics per outpatient pharmacy   - [facility] ac/hs  - endocrine consulted.     Problem/Plan - 2:  ·  Problem: SIRS (systemic inflammatory response syndrome).  ·  Plan: - Patient presented to the ED with the c/o Generalized weakness and possible fall and was found to have hypothermia 94.9 and leukocytosis of 16.82  - Patient meets SIRS criteria -   - UA unremarkable, no abdominal pain, CXR with no infiltrates seen on wet read   - S/P ceftriaxone in the ED - will monitor off Abx  - Patient was found to be hypoglycemic on arrival to the ED with POCS in 30's  - Hypothermia and leukocytosis likely reactive in the setting of hypoglycemia  - S/p Dextrose 50% IV X1 in the ED and D5 NS 1L X1 with appropriate response  - will monitor off Abx pending ID recommendations   - CT BRAIN: No acute intracranial bleeding. Central volume loss, chronic microvascular ischemic changes, and chronic bilateral lacunar infarctions.  - CT CERVICAL SPINE: No fracture. Grade 1 C4-C5 anterolisthesis. C6-C7 disc degeneration. Bilateral pleural effusions and interlobular septal thickening due to interstitial edema.  - CXR: no clear evidence of PNA  - F/U CT chest   - Trend WBC and monitor for fever  - F/u UA, UCx, BCx x2  - Lactate 2 on admission, f/u repeat lactate   - ID Dr. Tsai consulted, will appreciate recs.     Problem/Plan - 3:  ·  Problem: Pleural effusion.   ·  Plan: - No Hx of pulmonary disease  - Patient does not c/o cough, fever or chills  - CT Cervical shoes Bilateral pleural effusions and interlobular septal thickening due to interstitial edema.  - F/U dedicated CT chest  - s/p ceftriaxone in the ED - will monitor off Abx pending  ct results  - ID Dr. Tsai, consulted, will appreciate recs.     Problem/Plan - 4:  ·  Problem: HFrEF (heart failure with reduced ejection fraction).   ·  Plan: - patient with hx of HFrEF  - last ECHO in 04/22 shows EF of 45%  - Admission labs show ProBNP of [id number]  - Likely elevated in the setting of ESRD  - Consult Dr. James, will appreciate recs   - Avoid excessive fluids.     Problem/Plan - 5:  ·  Problem: Elevated troponin.   ·  Plan: - Admission labs show elevated Troponin of 275.9  - Patient denies any chest pain, sob or palpitations  - EKG shows NSR with left axis deviation with non specific ST and T waves changes  - elevated troponin likely due to ESRD  - trend x3 or to peak.     Problem/Plan - 6:  ·  Problem: ESRD on hemodialysis.   ·  Plan: - Patient with hx of ESRD  - On HD TTS schedule  - admission eGFR 9  - Follows Dr. Edwards as out patient, will consult Dr. Edwards, appreciate recs.     Problem/Plan - 7:  ·  Problem: T2DM (type 2 diabetes mellitus).   ·  Plan: - Chronic stable  - On Lantus 40 qhs  - Will start on lantus 20 units qhs with LDSS given hypoglycemic episode  - Accu checks AC/HS  - F/U A1C  - Adjust insulin requirement based on blood sugar levels  - per outpatient pharmacy patient recently rx ademolog however has not yet picked up and pharmacy unsure of dose   - endocrinology consulted, Dr. Perlman.     Problem/Plan - 8:  ·  Problem: Atrial fibrillation.   ·  Plan: - Patient with hx of P A.Fib  - Not on any AC because of hx of multiple falls   - on Lopressor 100mg q12 and Diltiazem 60mg q8 - will continue with hold parameters  - EKG shows NSR with left axis deviation with non specific ST and T waves changes.     Problem/Plan - 9:  ·  Problem: Benign essential HTN.   ·  Plan: - Chronic stable  - VSS  - Continue Amlodipine 5 mg with hold parameters  - Dash/Renal Diet  - continue to monitor routine hemodynamics.     Problem/Plan - 10:  ·  Problem: HLD (hyperlipidemia).   ·  Plan; - Chronic stable  - On atorvastatin 40mg at home - will continue  - Dash/Renal diet.     Problem/Plan - 11:  ·  Problem: Depression, major.   ·  Plan: - Chronic stable  - Continue imipramine 50qhs.     Problem/Plan - 12:  ·  Problem: Need for prophylactic measure.   ·  Plan: - DVT Prophylaxis: Heparin 5000 units q12.

## 2022-06-20 NOTE — PROGRESS NOTE ADULT - SUBJECTIVE AND OBJECTIVE BOX
Patient is a 71y old  Female who presents with a chief complaint of SIRS (20 Jun 2022 13:50)      INTERVAL HPI/OVERNIGHT EVENTS:    MEDICATIONS  (STANDING):  amLODIPine   Tablet 5 milliGRAM(s) Oral daily  aspirin enteric coated 81 milliGRAM(s) Oral daily  atorvastatin 40 milliGRAM(s) Oral at bedtime  calcium acetate 667 milliGRAM(s) Oral three times a day with meals  ceFAZolin   IVPB 1000 milliGRAM(s) IV Intermittent once  cefTRIAXone   IVPB 1000 milliGRAM(s) IV Intermittent every 24 hours  cloNIDine 0.1 milliGRAM(s) Oral two times a day  dextrose 5%. 1000 milliLiter(s) (100 mL/Hr) IV Continuous <Continuous>  dextrose 5%. 1000 milliLiter(s) (50 mL/Hr) IV Continuous <Continuous>  dextrose 50% Injectable 25 Gram(s) IV Push once  dextrose 50% Injectable 12.5 Gram(s) IV Push once  dextrose 50% Injectable 25 Gram(s) IV Push once  diltiazem    Tablet 60 milliGRAM(s) Oral every 8 hours  glucagon  Injectable 1 milliGRAM(s) IntraMuscular once  heparin   Injectable 5000 Unit(s) SubCutaneous every 12 hours  imipramine 50 milliGRAM(s) Oral daily  insulin glargine Injectable (LANTUS) 8 Unit(s) SubCutaneous at bedtime  insulin lispro (ADMELOG) corrective regimen sliding scale   SubCutaneous three times a day before meals  insulin lispro (ADMELOG) corrective regimen sliding scale   SubCutaneous at bedtime  metoprolol tartrate 100 milliGRAM(s) Oral every 12 hours  pantoprazole    Tablet 40 milliGRAM(s) Oral before breakfast  povidone iodine 10% Solution 1 Application(s) Topical daily    MEDICATIONS  (PRN):  acetaminophen     Tablet .. 650 milliGRAM(s) Oral every 6 hours PRN Temp greater or equal to 38C (100.4F), Mild Pain (1 - 3)  aluminum hydroxide/magnesium hydroxide/simethicone Suspension 30 milliLiter(s) Oral every 4 hours PRN Dyspepsia  dextrose Oral Gel 15 Gram(s) Oral once PRN Blood Glucose LESS THAN 70 milliGRAM(s)/deciliter  diphenhydrAMINE Injectable 25 milliGRAM(s) IV Push <User Schedule> PRN Combative behavior  melatonin 3 milliGRAM(s) Oral at bedtime PRN Insomnia  ondansetron Injectable 4 milliGRAM(s) IV Push every 8 hours PRN Nausea and/or Vomiting      Allergies    latex (Unknown)  No Known Drug Allergies    Intolerances        REVIEW OF SYSTEMS:  CONSTITUTIONAL: No fever or chills  HEENT:  No headache, no sore throat  RESPIRATORY: No cough, wheezing, or shortness of breath  CARDIOVASCULAR: No chest pain, palpitations, or leg swelling  GASTROINTESTINAL: No abd pain, nausea, vomiting, or diarrhea  GENITOURINARY: No dysuria, frequency, or hematuria  NEUROLOGICAL: no focal weakness or dizziness  MUSCULOSKELETAL: no myalgias     Vital Signs Last 24 Hrs  T(C): 36.6 (20 Jun 2022 16:15), Max: 37.1 (19 Jun 2022 20:49)  T(F): 97.8 (20 Jun 2022 16:15), Max: 98.8 (19 Jun 2022 20:49)  HR: 70 (20 Jun 2022 16:15) (53 - 74)  BP: 138/81 (20 Jun 2022 16:15) (101/60 - 150/72)  BP(mean): --  RR: 19 (20 Jun 2022 16:15) (18 - 20)  SpO2: 98% (20 Jun 2022 16:15) (91% - 100%)    PHYSICAL EXAM:  GENERAL: NAD  HEENT:  NC/AT, EOMI, moist mucous membranes  CHEST/LUNG:  CTA b/l, no rales, wheezes, or rhonchi  HEART:  RRR, S1, S2  ABDOMEN:  BS+, soft, nontender, nondistended  EXTREMITIES: no edema, cyanosis, or calf tenderness  NERVOUS SYSTEM: AA&Ox3    LABS:                        11.4   9.64  )-----------( 274      ( 20 Jun 2022 07:04 )             37.1     CBC Full  -  ( 20 Jun 2022 07:04 )  WBC Count : 9.64 K/uL  Hemoglobin : 11.4 g/dL  Hematocrit : 37.1 %  Platelet Count - Automated : 274 K/uL  Mean Cell Volume : 92.5 fl  Mean Cell Hemoglobin : 28.4 pg  Mean Cell Hemoglobin Concentration : 30.7 gm/dL  Auto Neutrophil # : x  Auto Lymphocyte # : x  Auto Monocyte # : x  Auto Eosinophil # : x  Auto Basophil # : x  Auto Neutrophil % : x  Auto Lymphocyte % : x  Auto Monocyte % : x  Auto Eosinophil % : x  Auto Basophil % : x    20 Jun 2022 07:04    137    |  96     |  32     ----------------------------<  163    4.9     |  32     |  4.80     Ca    9.1        20 Jun 2022 07:04    TPro  6.4    /  Alb  2.6    /  TBili  0.4    /  DBili  x      /  AST  18     /  ALT  17     /  AlkPhos  101    20 Jun 2022 07:04    PT/INR - ( 20 Jun 2022 12:36 )   PT: 11.4 sec;   INR: 0.97 ratio         PTT - ( 20 Jun 2022 12:36 )  PTT:29.8 sec    CAPILLARY BLOOD GLUCOSE      POCT Blood Glucose.: 173 mg/dL (20 Jun 2022 16:35)  POCT Blood Glucose.: 148 mg/dL (20 Jun 2022 11:18)  POCT Blood Glucose.: 158 mg/dL (20 Jun 2022 07:33)  POCT Blood Glucose.: 194 mg/dL (19 Jun 2022 20:57)        Culture - Urine (collected 06-16-22 @ 03:40)  Source: Clean Catch Clean Catch (Midstream)  Final Report (06-17-22 @ 07:32):    <10,000 CFU/mL Normal Urogenital Shantel    Culture - Blood (collected 06-16-22 @ 03:38)  Source: .Blood Blood-Peripheral  Preliminary Report (06-17-22 @ 04:01):    No growth to date.    Culture - Blood (collected 06-16-22 @ 03:38)  Source: .Blood Blood-Peripheral  Preliminary Report (06-17-22 @ 04:00):    No growth to date.        RADIOLOGY & ADDITIONAL TESTS:    Personally reviewed.     Consultant(s) Notes Reviewed:  [x] YES  [ ] NO    Care Discussed with [x] Consultants  [x] Patient  [ ] Family  [ ]      [ ] Other; RN  DVT ppx   Patient is a 71y old  Female who presents with a chief complaint of SIRS (20 Jun 2022 13:50)      INTERVAL HPI/OVERNIGHT EVENTS: Pt seen and examined at bedside. has no complaints.     MEDICATIONS  (STANDING):  amLODIPine   Tablet 5 milliGRAM(s) Oral daily  aspirin enteric coated 81 milliGRAM(s) Oral daily  atorvastatin 40 milliGRAM(s) Oral at bedtime  calcium acetate 667 milliGRAM(s) Oral three times a day with meals  ceFAZolin   IVPB 1000 milliGRAM(s) IV Intermittent once  cefTRIAXone   IVPB 1000 milliGRAM(s) IV Intermittent every 24 hours  cloNIDine 0.1 milliGRAM(s) Oral two times a day  dextrose 5%. 1000 milliLiter(s) (100 mL/Hr) IV Continuous <Continuous>  dextrose 5%. 1000 milliLiter(s) (50 mL/Hr) IV Continuous <Continuous>  dextrose 50% Injectable 25 Gram(s) IV Push once  dextrose 50% Injectable 12.5 Gram(s) IV Push once  dextrose 50% Injectable 25 Gram(s) IV Push once  diltiazem    Tablet 60 milliGRAM(s) Oral every 8 hours  glucagon  Injectable 1 milliGRAM(s) IntraMuscular once  heparin   Injectable 5000 Unit(s) SubCutaneous every 12 hours  imipramine 50 milliGRAM(s) Oral daily  insulin glargine Injectable (LANTUS) 8 Unit(s) SubCutaneous at bedtime  insulin lispro (ADMELOG) corrective regimen sliding scale   SubCutaneous three times a day before meals  insulin lispro (ADMELOG) corrective regimen sliding scale   SubCutaneous at bedtime  metoprolol tartrate 100 milliGRAM(s) Oral every 12 hours  pantoprazole    Tablet 40 milliGRAM(s) Oral before breakfast  povidone iodine 10% Solution 1 Application(s) Topical daily    MEDICATIONS  (PRN):  acetaminophen     Tablet .. 650 milliGRAM(s) Oral every 6 hours PRN Temp greater or equal to 38C (100.4F), Mild Pain (1 - 3)  aluminum hydroxide/magnesium hydroxide/simethicone Suspension 30 milliLiter(s) Oral every 4 hours PRN Dyspepsia  dextrose Oral Gel 15 Gram(s) Oral once PRN Blood Glucose LESS THAN 70 milliGRAM(s)/deciliter  diphenhydrAMINE Injectable 25 milliGRAM(s) IV Push <User Schedule> PRN Combative behavior  melatonin 3 milliGRAM(s) Oral at bedtime PRN Insomnia  ondansetron Injectable 4 milliGRAM(s) IV Push every 8 hours PRN Nausea and/or Vomiting      Allergies    latex (Unknown)  No Known Drug Allergies    Intolerances        REVIEW OF SYSTEMS:  CONSTITUTIONAL: No fever or chills  HEENT:  No headache, no sore throat  RESPIRATORY: No cough, wheezing, or shortness of breath  CARDIOVASCULAR: No chest pain, palpitations, or leg swelling  GASTROINTESTINAL: No abd pain, nausea, vomiting, or diarrhea  GENITOURINARY: No dysuria, frequency, or hematuria  NEUROLOGICAL: no focal weakness or dizziness  MUSCULOSKELETAL: no myalgias     Vital Signs Last 24 Hrs  T(C): 36.6 (20 Jun 2022 16:15), Max: 37.1 (19 Jun 2022 20:49)  T(F): 97.8 (20 Jun 2022 16:15), Max: 98.8 (19 Jun 2022 20:49)  HR: 70 (20 Jun 2022 16:15) (53 - 74)  BP: 138/81 (20 Jun 2022 16:15) (101/60 - 150/72)  BP(mean): --  RR: 19 (20 Jun 2022 16:15) (18 - 20)  SpO2: 98% (20 Jun 2022 16:15) (91% - 100%)    PHYSICAL EXAM:  GENERAL: elderly F in NAD  HEENT:  NC/AT, EOMI, moist mucous membranes  CHEST/LUNG: CTA b/l, no rales, wheezes, or rhonchi  HEART:  RRR, S1, S2  ABDOMEN:  BS+, soft, nontender, nondistended  EXTREMITIES: R great hallux w/ necrotic dry gangrene, poor nail hygiene overall  NERVOUS SYSTEM: AA&Ox3    LABS:                        11.4   9.64  )-----------( 274      ( 20 Jun 2022 07:04 )             37.1     CBC Full  -  ( 20 Jun 2022 07:04 )  WBC Count : 9.64 K/uL  Hemoglobin : 11.4 g/dL  Hematocrit : 37.1 %  Platelet Count - Automated : 274 K/uL  Mean Cell Volume : 92.5 fl  Mean Cell Hemoglobin : 28.4 pg  Mean Cell Hemoglobin Concentration : 30.7 gm/dL  Auto Neutrophil # : x  Auto Lymphocyte # : x  Auto Monocyte # : x  Auto Eosinophil # : x  Auto Basophil # : x  Auto Neutrophil % : x  Auto Lymphocyte % : x  Auto Monocyte % : x  Auto Eosinophil % : x  Auto Basophil % : x    20 Jun 2022 07:04    137    |  96     |  32     ----------------------------<  163    4.9     |  32     |  4.80     Ca    9.1        20 Jun 2022 07:04    TPro  6.4    /  Alb  2.6    /  TBili  0.4    /  DBili  x      /  AST  18     /  ALT  17     /  AlkPhos  101    20 Jun 2022 07:04    PT/INR - ( 20 Jun 2022 12:36 )   PT: 11.4 sec;   INR: 0.97 ratio         PTT - ( 20 Jun 2022 12:36 )  PTT:29.8 sec    CAPILLARY BLOOD GLUCOSE      POCT Blood Glucose.: 173 mg/dL (20 Jun 2022 16:35)  POCT Blood Glucose.: 148 mg/dL (20 Jun 2022 11:18)  POCT Blood Glucose.: 158 mg/dL (20 Jun 2022 07:33)  POCT Blood Glucose.: 194 mg/dL (19 Jun 2022 20:57)        Culture - Urine (collected 06-16-22 @ 03:40)  Source: Clean Catch Clean Catch (Midstream)  Final Report (06-17-22 @ 07:32):    <10,000 CFU/mL Normal Urogenital Shantel    Culture - Blood (collected 06-16-22 @ 03:38)  Source: .Blood Blood-Peripheral  Preliminary Report (06-17-22 @ 04:01):    No growth to date.    Culture - Blood (collected 06-16-22 @ 03:38)  Source: .Blood Blood-Peripheral  Preliminary Report (06-17-22 @ 04:00):    No growth to date.        RADIOLOGY & ADDITIONAL TESTS:    Personally reviewed.     Consultant(s) Notes Reviewed:  [x] YES  [ ] NO    Care Discussed with [x] Consultants  [x] Patient  [ ] Family  [ ]      [ ] Other; RN  DVT ppx

## 2022-06-20 NOTE — PROGRESS NOTE ADULT - PROBLEM SELECTOR PLAN 1
cont lantus 8 units qhs  cont admelog corrective scale coverage qac/qhs  cont cons cho diet  goal bg 100-180 in hosp setting

## 2022-06-20 NOTE — PROGRESS NOTE ADULT - ASSESSMENT
72 yo woman with PMH of HTN, DM2, CAD, CHF, PAD, A.fib, multiple falls and ESRD on HD was admitted on 6/16, came to ED with generalized weakness.   Her hypoglycemia and hypothermia on admission could be related to sepsis/SIRS. Source likely sec to Right hallux with a dry gangrene and superimposed infection.   TOMMY/PVR's noted.   Will need diagnostic angio scheduled for 6/21/22 with Dr Miller and likely RLE bypass based on previous CTA findings  NPO after midnight  Consider HD today prior to angio tomorrow which is scheduled tent for 10 am  Medical and cardiac optimization and risk assessment prior to procedure appreciated  Podiatry input appreciated  Discussed with Dr Miller

## 2022-06-20 NOTE — PROGRESS NOTE ADULT - SUBJECTIVE AND OBJECTIVE BOX
Patient is a 71y Female whom presented to the hospital with esrd on hd     PAST MEDICAL & SURGICAL HISTORY:  Diabetes mellitus II      HTN (hypertension)      h/o Anxiety attack      Depression      h/o Myocardial infarct 2007      CAD (coronary artery disease)      h/o Hepatitis A 1969  currently resolved, no symptoms      PAD (peripheral artery disease)      Murmur, cardiac      h/o Smoking  quitted 3/2012      CRF (chronic renal failure), unspecified stage      Dialysis patient      Anemia secondary to renal failure      HTN (hypertension)      coronary stent 2007      s/p Ovarian cyst removal      s/p surgical removal of benign Skin lesion epigastric area          MEDICATIONS  (STANDING):  amLODIPine   Tablet 5 milliGRAM(s) Oral daily  aspirin enteric coated 81 milliGRAM(s) Oral daily  atorvastatin 40 milliGRAM(s) Oral at bedtime  calcium acetate 667 milliGRAM(s) Oral three times a day with meals  cefTRIAXone   IVPB 1000 milliGRAM(s) IV Intermittent every 24 hours  cloNIDine 0.1 milliGRAM(s) Oral two times a day  clopidogrel Tablet 75 milliGRAM(s) Oral daily  dextrose 5%. 1000 milliLiter(s) (100 mL/Hr) IV Continuous <Continuous>  dextrose 5%. 1000 milliLiter(s) (50 mL/Hr) IV Continuous <Continuous>  dextrose 50% Injectable 25 Gram(s) IV Push once  dextrose 50% Injectable 12.5 Gram(s) IV Push once  dextrose 50% Injectable 25 Gram(s) IV Push once  diltiazem    Tablet 60 milliGRAM(s) Oral every 8 hours  glucagon  Injectable 1 milliGRAM(s) IntraMuscular once  heparin   Injectable 5000 Unit(s) SubCutaneous every 12 hours  imipramine 50 milliGRAM(s) Oral daily  insulin glargine Injectable (LANTUS) 12 Unit(s) SubCutaneous at bedtime  insulin lispro (ADMELOG) corrective regimen sliding scale   SubCutaneous three times a day before meals  insulin lispro (ADMELOG) corrective regimen sliding scale   SubCutaneous at bedtime  metoprolol tartrate 100 milliGRAM(s) Oral every 12 hours  pantoprazole    Tablet 40 milliGRAM(s) Oral before breakfast  povidone iodine 10% Solution 1 Application(s) Topical daily      Allergies    latex (Unknown)  No Known Drug Allergies    Intolerances        SOCIAL HISTORY:  Denies ETOh,Smoking,     FAMILY HISTORY:  No pertinent family history in first degree relatives        REVIEW OF SYSTEMS:    CONSTITUTIONAL: No weakness, fevers or chills  RESPIRATORY: No cough, wheezing, hemoptysis; No shortness of breath  CARDIOVASCULAR: No chest pain or palpitations  GASTROINTESTINAL: No abdominal or epigastric pain. No nausea, vomiting,     No diarrhea or constipation. No melena   SKIN: dry                                                   11.4   9.64  )-----------( 274      ( 20 Jun 2022 07:04 )             37.1       CBC Full  -  ( 20 Jun 2022 07:04 )  WBC Count : 9.64 K/uL  RBC Count : 4.01 M/uL  Hemoglobin : 11.4 g/dL  Hematocrit : 37.1 %  Platelet Count - Automated : 274 K/uL  Mean Cell Volume : 92.5 fl  Mean Cell Hemoglobin : 28.4 pg  Mean Cell Hemoglobin Concentration : 30.7 gm/dL  Auto Neutrophil # : x  Auto Lymphocyte # : x  Auto Monocyte # : x  Auto Eosinophil # : x  Auto Basophil # : x  Auto Neutrophil % : x  Auto Lymphocyte % : x  Auto Monocyte % : x  Auto Eosinophil % : x  Auto Basophil % : x      06-20    137  |  96  |  32<H>  ----------------------------<  163<H>  4.9   |  32<H>  |  4.80<H>    Ca    9.1      20 Jun 2022 07:04    TPro  6.4  /  Alb  2.6<L>  /  TBili  0.4  /  DBili  x   /  AST  18  /  ALT  17  /  AlkPhos  101  06-20      CAPILLARY BLOOD GLUCOSE      POCT Blood Glucose.: 157 mg/dL (20 Jun 2022 21:08)  POCT Blood Glucose.: 101 mg/dL (20 Jun 2022 19:15)  POCT Blood Glucose.: 173 mg/dL (20 Jun 2022 16:35)  POCT Blood Glucose.: 148 mg/dL (20 Jun 2022 11:18)  POCT Blood Glucose.: 158 mg/dL (20 Jun 2022 07:33)      Vital Signs Last 24 Hrs  T(C): 36.8 (20 Jun 2022 19:55), Max: 36.8 (20 Jun 2022 19:55)  T(F): 98.2 (20 Jun 2022 19:55), Max: 98.2 (20 Jun 2022 19:55)  HR: 69 (20 Jun 2022 19:55) (53 - 76)  BP: 132/68 (20 Jun 2022 19:55) (101/60 - 150/72)  BP(mean): --  RR: 18 (20 Jun 2022 19:55) (18 - 20)  SpO2: 93% (20 Jun 2022 19:55) (92% - 100%)        PT/INR - ( 20 Jun 2022 12:36 )   PT: 11.4 sec;   INR: 0.97 ratio         PTT - ( 20 Jun 2022 12:36 )  PTT:29.8 sec            PHYSICAL EXAM:    Constitutional: NAD  HEENT: conjunctive   clear   Neck:  No JVD  Respiratory: CTAB  Cardiovascular: S1 and S2  Gastrointestinal: BS+, soft, NT/ND  Extremities: No peripheral edema  , pos toe bandage   : No Renteria  Skin: No rashes  Access: pos fistula

## 2022-06-20 NOTE — PROGRESS NOTE ADULT - SUBJECTIVE AND OBJECTIVE BOX
[facility]  INFECTIOUS DISEASES   [address] [place] [address]  Tel: [phone]     Fax: [phone]  ======================================================  MD Zita Alejandra Kaushal, MD Cho, Michelle, MD   ======================================================    [id number]  SHILO KOHLER     Follow up: R foot wound/gangrene     No new changes, no pain in foot, no fever.    Sitting on bed.     PAST MEDICAL & SURGICAL HISTORY:  Diabetes mellitus II  HTN (hypertension)  h/o Anxiety attack  Depression  h/o Myocardial infarct 2007  CAD (coronary artery disease)  h/o Hepatitis A 1969  currently resolved, no symptoms  PAD (peripheral artery disease)  Murmur, cardiac  h/o Smoking  quitted 3/2012  CRF (chronic renal failure), unspecified stage  Dialysis patient  Anemia secondary to renal failure  HTN (hypertension)  coronary stent 2007  s/p Ovarian cyst removal  s/p surgical removal of benign Skin lesion epigastric area    Social Hx: no current smoking, ETOH or drugs     FAMILY HISTORY:  No pertinent family history in first degree relatives    Allergies  latex (Unknown)  No Known Drug Allergies    Antibiotics:  zosyn     REVIEW OF SYSTEMS:  CONSTITUTIONAL:  No Fever or chills  HEENT:  No diplopia or blurred vision.  No sore throat or runny nose.  CARDIOVASCULAR:  No chest pain or SOB.  RESPIRATORY:  No cough, shortness of breath, PND or orthopnea.  GASTROINTESTINAL:  No nausea, vomiting or diarrhea.  GENITOURINARY:  No dysuria, frequency or urgency. No Blood in urine  MUSCULOSKELETAL:  no joint aches, no muscle pain  SKIN:  R foot wound   NEUROLOGIC:  No paresthesias, fasciculations, seizures or weakness.  PSYCHIATRIC:  No disorder of thought or mood.  ENDOCRINE:  No heat or cold intolerance, polyuria or polydipsia.  HEMATOLOGICAL:  No easy bruising or bleeding.     Physical Exam:  Vital Signs Last 24 Hrs  T(C): 36.3 (20 Jun 2022 12:56), Max: 37.1 (19 Jun 2022 20:49)  T(F): 97.4 (20 Jun 2022 12:56), Max: 98.8 (19 Jun 2022 20:49)  HR: 59 (20 Jun 2022 12:56) (53 - 74)  BP: 126/69 (20 Jun 2022 12:56) (101/60 - 150/72)  BP(mean): --  RR: 20 (20 Jun 2022 12:56) (18 - 20)  SpO2: 97% (20 Jun 2022 12:56) (91% - 100%)  GEN: NAD  HEENT: normocephalic and atraumatic. EOMI. PERRL.    NECK: Supple.  No lymphadenopathy   LUNGS: Clear to auscultation.  HEART: Irregular rate and rhythm   ABDOMEN: Soft, nontender, and nondistended.  Positive bowel sounds.    : No CVA tenderness  EXTREMITIES: R hallux with an ulcer, looks gangrenous with superimposed infection   has some bloody discharge. R heel ulcer very superficial,   left medial malleolus small ulcer minimal serous discharge   NEUROLOGIC: grossly intact.  PSYCHIATRIC: Appropriate affect .  SKIN: No rash     Labs:                        11.4   9.64  )-----------( 274      ( 20 Jun 2022 07:04 )             37.1     06-20    137  |  96  |  32<H>  ----------------------------<  163<H>  4.9   |  32<H>  |  4.80<H>    Ca    9.1      20 Jun 2022 07:04    TPro  6.4  /  Alb  2.6<L>  /  TBili  0.4  /  DBili  x   /  AST  18  /  ALT  17  /  AlkPhos  101  06-20      Culture - Urine (collected 06-16-22 @ 03:40)  Source: Clean Catch Clean Catch (Midstream)  Final Report (06-17-22 @ 07:32):    <10,000 CFU/mL Normal Urogenital Shantel    Culture - Blood (collected 06-16-22 @ 03:38)  Source: .Blood Blood-Peripheral    Culture - Blood (collected 06-16-22 @ 03:38)  Source: .Blood Blood-Peripheral    WBC Count: 9.64 K/uL (06-20-22 @ 07:04)  WBC Count: 9.99 K/uL (06-19-22 @ 07:18)  WBC Count: 10.73 K/uL (06-18-22 @ 07:54)  WBC Count: 12.79 K/uL (06-17-22 @ 07:52)  WBC Count: 16.09 K/uL (06-16-22 @ 11:57)  WBC Count: 16.82 K/uL (06-15-22 @ 21:13)    Creatinine, Serum: 4.80 mg/dL (06-20-22 @ 07:04)  Creatinine, Serum: 3.30 mg/dL (06-19-22 @ 07:18)  Creatinine, Serum: 4.90 mg/dL (06-18-22 @ 07:54)  Creatinine, Serum: 3.60 mg/dL (06-17-22 @ 07:52)  Creatinine, Serum: 5.40 mg/dL (06-16-22 @ 11:57)  Creatinine, Serum: 4.80 mg/dL (06-15-22 @ 21:13)    C-Reactive Protein, Serum: 19 mg/L (06-17-22 @ 10:38)    Sedimentation Rate, Erythrocyte: 13 mm/hr (06-17-22 @ 07:52)     COVID-19 PCR: NotDetec (06-20-22 @ 08:14)  SARS-CoV-2: NotDetec (06-15-22 @ 22:44)    All imaging and other data have been reviewed.  < from: CT Chest No Cont (06.16.22 @ 08:16) >  IMPRESSION:  Small layering bilateral pleural effusions decreased from 4/6/2022.  7 mm groundglass nodule within left lower lobe (series 2 image 44).   Recommend follow-up chest CT in 12 months to determine stability.    Assessment and Plan:   72 yo woman with PMH of HTN, DM2, CAD, CHF, A.fib, multiple falls and ESRD on HD was admitted on 6/16, came to ED with generalized weakness.   Her hypoglycemia and hypothermia on admission could be related to sepsis/SIRS source unclear at this time, could be foot infection? Left hallux looks like  a dry gangrene with superimposed infection.   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 She had Tissue cultures from 3/30/22 grew   klebsiella oxytoca/raoutella oxytoca, E. coli, MSSA so Cefazolin was given after HD to complete the course of treatment.   ESR 13 and CRP 19  MRSA PCR negative     Recommendations:  - Blood culture x 2 NGTD  - leukocytosis normalized from 16k to 9k  - Vascular surgery follow up noted, for revascularization tomorrow   - Podiatry follow up noted for possible amputation if patient agrees(refused in the past)  - Will continue ceftriaxone based on old culture    Will follow.    Geovany Long MD  Division of Infectious Diseases   Please call ID service at [phone] with any question.      35 minutes spent on total encounter assessing patient, examination, chart reivew, counseling and coordinating care by the attending physician/nurse/care manager.

## 2022-06-20 NOTE — PRE-OP CHECKLIST - SELECT TESTS ORDERED
BMP/CBC/CMP/PT/PTT/INR/Type and Screen/Urinalysis BMP/CBC/CMP/PT/PTT/INR/Type and Screen/Urinalysis/COVID-19

## 2022-06-20 NOTE — PROGRESS NOTE ADULT - PROBLEM SELECTOR PLAN 1
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  Discussed with pt the etiology of her condition and recommendation of Right hallux amputation. Patient is agreeable to plan.  Pt scheduled for Right Hallux amputation with Partial ray resection Thursday 6/23/2022 at 2pm with Dr. David Pastor.  - Discussed risks, benefits, and potential complications with patient and family members regarding surgical intervention including sepsis, further loss of limb, and loss of life. All questions answered to satisfaction at this time.    - Cardiac clearance appreciated  - pending medical clearance    - Vasc recs appreciated: scheduled for Angio, tomorrow 6/21  - Continue IV abx per ID recs: [email or web address] 1000 milliGRAM(s) IV   - Continue med management  - Dressing clean, dry and intact    Podiatry to follow

## 2022-06-20 NOTE — PROGRESS NOTE ADULT - ASSESSMENT
pt with hypoglycemia  elevated troponin due to renal failure  ashd , s/p mi  s/p coronary stent  s/p cabg  [id number]  esrd - on hd  dm2  hypertension  paf -not on oac - fall risk  pvd - s/p stent  dyslipidemia   chf - compensated - recent coronary angiogram - patent graft -pt's cradiac status stable low  to intermediate risk for  pvd surgery and amputation of great  toe if needed  6/20

## 2022-06-21 ENCOUNTER — TRANSCRIPTION ENCOUNTER (OUTPATIENT)
Age: 72
End: 2022-06-21

## 2022-06-21 DIAGNOSIS — I70.209 UNSPECIFIED ATHEROSCLEROSIS OF NATIVE ARTERIES OF EXTREMITIES, UNSPECIFIED EXTREMITY: ICD-10-CM

## 2022-06-21 LAB
ANION GAP SERPL CALC-SCNC: 11 MMOL/L — SIGNIFICANT CHANGE UP (ref 5–17)
BASOPHILS # BLD AUTO: 0.07 K/UL — SIGNIFICANT CHANGE UP (ref 0–0.2)
BASOPHILS NFR BLD AUTO: 0.7 % — SIGNIFICANT CHANGE UP (ref 0–2)
BUN SERPL-MCNC: 25 MG/DL — HIGH (ref 7–23)
CALCIUM SERPL-MCNC: 8.7 MG/DL — SIGNIFICANT CHANGE UP (ref 8.5–10.1)
CHLORIDE SERPL-SCNC: 99 MMOL/L — SIGNIFICANT CHANGE UP (ref 96–108)
CO2 SERPL-SCNC: 24 MMOL/L — SIGNIFICANT CHANGE UP (ref 22–31)
CREAT SERPL-MCNC: 3.9 MG/DL — HIGH (ref 0.5–1.3)
CULTURE RESULTS: SIGNIFICANT CHANGE UP
CULTURE RESULTS: SIGNIFICANT CHANGE UP
EGFR: 12 ML/MIN/1.73M2 — LOW
EOSINOPHIL # BLD AUTO: 0.25 K/UL — SIGNIFICANT CHANGE UP (ref 0–0.5)
EOSINOPHIL NFR BLD AUTO: 2.5 % — SIGNIFICANT CHANGE UP (ref 0–6)
GLUCOSE SERPL-MCNC: 188 MG/DL — HIGH (ref 70–99)
HCT VFR BLD CALC: 39.5 % — SIGNIFICANT CHANGE UP (ref 34.5–45)
HCT VFR BLD CALC: 41.2 % — SIGNIFICANT CHANGE UP (ref 34.5–45)
HGB BLD-MCNC: 12.2 G/DL — SIGNIFICANT CHANGE UP (ref 11.5–15.5)
HGB BLD-MCNC: 12.4 G/DL — SIGNIFICANT CHANGE UP (ref 11.5–15.5)
IMM GRANULOCYTES NFR BLD AUTO: 1.1 % — SIGNIFICANT CHANGE UP (ref 0–1.5)
LYMPHOCYTES # BLD AUTO: 1.31 K/UL — SIGNIFICANT CHANGE UP (ref 1–3.3)
LYMPHOCYTES # BLD AUTO: 12.9 % — LOW (ref 13–44)
MAGNESIUM SERPL-MCNC: 2.4 MG/DL — SIGNIFICANT CHANGE UP (ref 1.6–2.6)
MCHC RBC-ENTMCNC: 28.3 PG — SIGNIFICANT CHANGE UP (ref 27–34)
MCHC RBC-ENTMCNC: 28.6 PG — SIGNIFICANT CHANGE UP (ref 27–34)
MCHC RBC-ENTMCNC: 30.1 GM/DL — LOW (ref 32–36)
MCHC RBC-ENTMCNC: 30.9 GM/DL — LOW (ref 32–36)
MCV RBC AUTO: 92.5 FL — SIGNIFICANT CHANGE UP (ref 80–100)
MCV RBC AUTO: 94.1 FL — SIGNIFICANT CHANGE UP (ref 80–100)
MONOCYTES # BLD AUTO: 1.59 K/UL — HIGH (ref 0–0.9)
MONOCYTES NFR BLD AUTO: 15.6 % — HIGH (ref 2–14)
NEUTROPHILS # BLD AUTO: 6.85 K/UL — SIGNIFICANT CHANGE UP (ref 1.8–7.4)
NEUTROPHILS NFR BLD AUTO: 67.2 % — SIGNIFICANT CHANGE UP (ref 43–77)
NRBC # BLD: 0 /100 WBCS — SIGNIFICANT CHANGE UP (ref 0–0)
NRBC # BLD: 0 /100 WBCS — SIGNIFICANT CHANGE UP (ref 0–0)
PHOSPHATE SERPL-MCNC: 4.1 MG/DL — SIGNIFICANT CHANGE UP (ref 2.5–4.5)
PLATELET # BLD AUTO: 278 K/UL — SIGNIFICANT CHANGE UP (ref 150–400)
PLATELET # BLD AUTO: 336 K/UL — SIGNIFICANT CHANGE UP (ref 150–400)
POTASSIUM SERPL-MCNC: 4.5 MMOL/L — SIGNIFICANT CHANGE UP (ref 3.5–5.3)
POTASSIUM SERPL-SCNC: 4.5 MMOL/L — SIGNIFICANT CHANGE UP (ref 3.5–5.3)
RBC # BLD: 4.27 M/UL — SIGNIFICANT CHANGE UP (ref 3.8–5.2)
RBC # BLD: 4.38 M/UL — SIGNIFICANT CHANGE UP (ref 3.8–5.2)
RBC # FLD: 17.2 % — HIGH (ref 10.3–14.5)
RBC # FLD: 17.2 % — HIGH (ref 10.3–14.5)
SODIUM SERPL-SCNC: 134 MMOL/L — LOW (ref 135–145)
SPECIMEN SOURCE: SIGNIFICANT CHANGE UP
SPECIMEN SOURCE: SIGNIFICANT CHANGE UP
WBC # BLD: 10.18 K/UL — SIGNIFICANT CHANGE UP (ref 3.8–10.5)
WBC # BLD: 17.74 K/UL — HIGH (ref 3.8–10.5)
WBC # FLD AUTO: 10.18 K/UL — SIGNIFICANT CHANGE UP (ref 3.8–10.5)
WBC # FLD AUTO: 17.74 K/UL — HIGH (ref 3.8–10.5)

## 2022-06-21 PROCEDURE — 99233 SBSQ HOSP IP/OBS HIGH 50: CPT

## 2022-06-21 PROCEDURE — 35656 BPG FEMORAL-POPLITEAL: CPT | Mod: AS,RT

## 2022-06-21 PROCEDURE — 99232 SBSQ HOSP IP/OBS MODERATE 35: CPT

## 2022-06-21 PROCEDURE — 35656 BPG FEMORAL-POPLITEAL: CPT | Mod: RT

## 2022-06-21 DEVICE — LIGATING CLIPS WECK HORIZON SMALL-WIDE (RED) 24: Type: IMPLANTABLE DEVICE | Status: FUNCTIONAL

## 2022-06-21 DEVICE — LIGATING CLIPS WECK HORIZON MEDIUM (BLUE) 24: Type: IMPLANTABLE DEVICE | Status: FUNCTIONAL

## 2022-06-21 DEVICE — IMPLANTABLE DEVICE: Type: IMPLANTABLE DEVICE | Status: FUNCTIONAL

## 2022-06-21 DEVICE — SURGIFOAM PAD 8CM X 12.5CM X 10MM (100): Type: IMPLANTABLE DEVICE | Status: FUNCTIONAL

## 2022-06-21 DEVICE — SURGICEL FIBRILLAR 2 X 4": Type: IMPLANTABLE DEVICE | Status: FUNCTIONAL

## 2022-06-21 RX ORDER — HYDROMORPHONE HYDROCHLORIDE 2 MG/ML
0.5 INJECTION INTRAMUSCULAR; INTRAVENOUS; SUBCUTANEOUS EVERY 4 HOURS
Refills: 0 | Status: DISCONTINUED | OUTPATIENT
Start: 2022-06-21 | End: 2022-06-23

## 2022-06-21 RX ORDER — ATORVASTATIN CALCIUM 80 MG/1
40 TABLET, FILM COATED ORAL AT BEDTIME
Refills: 0 | Status: DISCONTINUED | OUTPATIENT
Start: 2022-06-21 | End: 2022-06-23

## 2022-06-21 RX ORDER — SODIUM CHLORIDE 9 MG/ML
1000 INJECTION, SOLUTION INTRAVENOUS
Refills: 0 | Status: DISCONTINUED | OUTPATIENT
Start: 2022-06-21 | End: 2022-06-21

## 2022-06-21 RX ORDER — DEXTROSE 50 % IN WATER 50 %
15 SYRINGE (ML) INTRAVENOUS ONCE
Refills: 0 | Status: DISCONTINUED | OUTPATIENT
Start: 2022-06-21 | End: 2022-06-23

## 2022-06-21 RX ORDER — AMLODIPINE BESYLATE 2.5 MG/1
5 TABLET ORAL DAILY
Refills: 0 | Status: DISCONTINUED | OUTPATIENT
Start: 2022-06-21 | End: 2022-06-23

## 2022-06-21 RX ORDER — METOPROLOL TARTRATE 50 MG
100 TABLET ORAL EVERY 12 HOURS
Refills: 0 | Status: DISCONTINUED | OUTPATIENT
Start: 2022-06-21 | End: 2022-06-23

## 2022-06-21 RX ORDER — LANOLIN ALCOHOL/MO/W.PET/CERES
3 CREAM (GRAM) TOPICAL AT BEDTIME
Refills: 0 | Status: DISCONTINUED | OUTPATIENT
Start: 2022-06-21 | End: 2022-06-23

## 2022-06-21 RX ORDER — ASPIRIN/CALCIUM CARB/MAGNESIUM 324 MG
81 TABLET ORAL DAILY
Refills: 0 | Status: DISCONTINUED | OUTPATIENT
Start: 2022-06-22 | End: 2022-06-23

## 2022-06-21 RX ORDER — DEXTROSE 50 % IN WATER 50 %
12.5 SYRINGE (ML) INTRAVENOUS ONCE
Refills: 0 | Status: DISCONTINUED | OUTPATIENT
Start: 2022-06-21 | End: 2022-06-23

## 2022-06-21 RX ORDER — ONDANSETRON 8 MG/1
4 TABLET, FILM COATED ORAL EVERY 8 HOURS
Refills: 0 | Status: DISCONTINUED | OUTPATIENT
Start: 2022-06-21 | End: 2022-06-23

## 2022-06-21 RX ORDER — BUMETANIDE 0.25 MG/ML
2 INJECTION INTRAMUSCULAR; INTRAVENOUS ONCE
Refills: 0 | Status: DISCONTINUED | OUTPATIENT
Start: 2022-06-21 | End: 2022-06-21

## 2022-06-21 RX ORDER — INSULIN LISPRO 100/ML
VIAL (ML) SUBCUTANEOUS
Refills: 0 | Status: DISCONTINUED | OUTPATIENT
Start: 2022-06-21 | End: 2022-06-23

## 2022-06-21 RX ORDER — ACETAMINOPHEN 500 MG
1000 TABLET ORAL ONCE
Refills: 0 | Status: COMPLETED | OUTPATIENT
Start: 2022-06-21 | End: 2022-06-21

## 2022-06-21 RX ORDER — ACETAMINOPHEN 500 MG
1000 TABLET ORAL EVERY 6 HOURS
Refills: 0 | Status: DISCONTINUED | OUTPATIENT
Start: 2022-06-21 | End: 2022-06-23

## 2022-06-21 RX ORDER — DIPHENHYDRAMINE HCL 50 MG
25 CAPSULE ORAL
Refills: 0 | Status: DISCONTINUED | OUTPATIENT
Start: 2022-06-21 | End: 2022-06-23

## 2022-06-21 RX ORDER — CLOPIDOGREL BISULFATE 75 MG/1
75 TABLET, FILM COATED ORAL DAILY
Refills: 0 | Status: DISCONTINUED | OUTPATIENT
Start: 2022-06-22 | End: 2022-06-23

## 2022-06-21 RX ORDER — POVIDONE-IODINE 5 %
1 AEROSOL (ML) TOPICAL DAILY
Refills: 0 | Status: DISCONTINUED | OUTPATIENT
Start: 2022-06-21 | End: 2022-06-23

## 2022-06-21 RX ORDER — DEXTROSE 50 % IN WATER 50 %
25 SYRINGE (ML) INTRAVENOUS ONCE
Refills: 0 | Status: DISCONTINUED | OUTPATIENT
Start: 2022-06-21 | End: 2022-06-23

## 2022-06-21 RX ORDER — SODIUM CHLORIDE 9 MG/ML
1000 INJECTION INTRAMUSCULAR; INTRAVENOUS; SUBCUTANEOUS
Refills: 0 | Status: DISCONTINUED | OUTPATIENT
Start: 2022-06-21 | End: 2022-06-23

## 2022-06-21 RX ORDER — HYDROMORPHONE HYDROCHLORIDE 2 MG/ML
0.5 INJECTION INTRAMUSCULAR; INTRAVENOUS; SUBCUTANEOUS
Refills: 0 | Status: DISCONTINUED | OUTPATIENT
Start: 2022-06-21 | End: 2022-06-21

## 2022-06-21 RX ORDER — ONDANSETRON 8 MG/1
4 TABLET, FILM COATED ORAL ONCE
Refills: 0 | Status: DISCONTINUED | OUTPATIENT
Start: 2022-06-21 | End: 2022-06-21

## 2022-06-21 RX ORDER — CALCIUM ACETATE 667 MG
667 TABLET ORAL
Refills: 0 | Status: DISCONTINUED | OUTPATIENT
Start: 2022-06-21 | End: 2022-06-23

## 2022-06-21 RX ORDER — HEPARIN SODIUM 5000 [USP'U]/ML
5000 INJECTION INTRAVENOUS; SUBCUTANEOUS EVERY 12 HOURS
Refills: 0 | Status: DISCONTINUED | OUTPATIENT
Start: 2022-06-21 | End: 2022-06-23

## 2022-06-21 RX ORDER — DILTIAZEM HCL 120 MG
60 CAPSULE, EXT RELEASE 24 HR ORAL EVERY 8 HOURS
Refills: 0 | Status: DISCONTINUED | OUTPATIENT
Start: 2022-06-21 | End: 2022-06-23

## 2022-06-21 RX ORDER — SODIUM CHLORIDE 9 MG/ML
1000 INJECTION, SOLUTION INTRAVENOUS
Refills: 0 | Status: DISCONTINUED | OUTPATIENT
Start: 2022-06-21 | End: 2022-06-23

## 2022-06-21 RX ORDER — GLUCAGON INJECTION, SOLUTION 0.5 MG/.1ML
1 INJECTION, SOLUTION SUBCUTANEOUS ONCE
Refills: 0 | Status: DISCONTINUED | OUTPATIENT
Start: 2022-06-21 | End: 2022-06-23

## 2022-06-21 RX ORDER — HYDROMORPHONE HYDROCHLORIDE 2 MG/ML
1 INJECTION INTRAMUSCULAR; INTRAVENOUS; SUBCUTANEOUS EVERY 4 HOURS
Refills: 0 | Status: DISCONTINUED | OUTPATIENT
Start: 2022-06-21 | End: 2022-06-23

## 2022-06-21 RX ORDER — PANTOPRAZOLE SODIUM 20 MG/1
40 TABLET, DELAYED RELEASE ORAL
Refills: 0 | Status: DISCONTINUED | OUTPATIENT
Start: 2022-06-21 | End: 2022-06-23

## 2022-06-21 RX ORDER — CHLORHEXIDINE GLUCONATE 213 G/1000ML
1 SOLUTION TOPICAL
Refills: 0 | Status: DISCONTINUED | OUTPATIENT
Start: 2022-06-21 | End: 2022-06-23

## 2022-06-21 RX ORDER — CEFTRIAXONE 500 MG/1
1000 INJECTION, POWDER, FOR SOLUTION INTRAMUSCULAR; INTRAVENOUS EVERY 24 HOURS
Refills: 0 | Status: DISCONTINUED | OUTPATIENT
Start: 2022-06-21 | End: 2022-06-23

## 2022-06-21 RX ORDER — INSULIN LISPRO 100/ML
VIAL (ML) SUBCUTANEOUS AT BEDTIME
Refills: 0 | Status: DISCONTINUED | OUTPATIENT
Start: 2022-06-21 | End: 2022-06-23

## 2022-06-21 RX ORDER — INSULIN GLARGINE 100 [IU]/ML
8 INJECTION, SOLUTION SUBCUTANEOUS AT BEDTIME
Refills: 0 | Status: DISCONTINUED | OUTPATIENT
Start: 2022-06-21 | End: 2022-06-23

## 2022-06-21 RX ORDER — PANTOPRAZOLE SODIUM 20 MG/1
40 TABLET, DELAYED RELEASE ORAL DAILY
Refills: 0 | Status: DISCONTINUED | OUTPATIENT
Start: 2022-06-21 | End: 2022-06-21

## 2022-06-21 RX ADMIN — Medication 1000 MILLIGRAM(S): at 23:49

## 2022-06-21 RX ADMIN — INSULIN GLARGINE 8 UNIT(S): 100 INJECTION, SOLUTION SUBCUTANEOUS at 22:08

## 2022-06-21 RX ADMIN — ATORVASTATIN CALCIUM 40 MILLIGRAM(S): 80 TABLET, FILM COATED ORAL at 22:09

## 2022-06-21 RX ADMIN — Medication 0.1 MILLIGRAM(S): at 06:13

## 2022-06-21 RX ADMIN — Medication 60 MILLIGRAM(S): at 06:13

## 2022-06-21 RX ADMIN — Medication 1000 MILLIGRAM(S): at 20:20

## 2022-06-21 RX ADMIN — SODIUM CHLORIDE 50 MILLILITER(S): 9 INJECTION INTRAMUSCULAR; INTRAVENOUS; SUBCUTANEOUS at 06:12

## 2022-06-21 RX ADMIN — CEFTRIAXONE 100 MILLIGRAM(S): 500 INJECTION, POWDER, FOR SOLUTION INTRAMUSCULAR; INTRAVENOUS at 12:39

## 2022-06-21 RX ADMIN — AMLODIPINE BESYLATE 5 MILLIGRAM(S): 2.5 TABLET ORAL at 06:13

## 2022-06-21 RX ADMIN — Medication 100 MILLIGRAM(S): at 06:13

## 2022-06-21 RX ADMIN — Medication 400 MILLIGRAM(S): at 19:22

## 2022-06-21 RX ADMIN — Medication 60 MILLIGRAM(S): at 22:09

## 2022-06-21 RX ADMIN — PANTOPRAZOLE SODIUM 40 MILLIGRAM(S): 20 TABLET, DELAYED RELEASE ORAL at 06:13

## 2022-06-21 RX ADMIN — SODIUM CHLORIDE 75 MILLILITER(S): 9 INJECTION, SOLUTION INTRAVENOUS at 18:00

## 2022-06-21 NOTE — PROGRESS NOTE ADULT - SUBJECTIVE AND OBJECTIVE BOX
Patient is a 71y old  Female who presents with a chief complaint of SIRS (21 Jun 2022 08:31)      INTERVAL HPI/OVERNIGHT EVENTS:    MEDICATIONS  (STANDING):  amLODIPine   Tablet 5 milliGRAM(s) Oral daily  aspirin enteric coated 81 milliGRAM(s) Oral daily  atorvastatin 40 milliGRAM(s) Oral at bedtime  calcium acetate 667 milliGRAM(s) Oral three times a day with meals  ceFAZolin   IVPB 1000 milliGRAM(s) IV Intermittent once  cefTRIAXone   IVPB 1000 milliGRAM(s) IV Intermittent every 24 hours  cloNIDine 0.1 milliGRAM(s) Oral two times a day  dextrose 5%. 1000 milliLiter(s) (100 mL/Hr) IV Continuous <Continuous>  dextrose 5%. 1000 milliLiter(s) (50 mL/Hr) IV Continuous <Continuous>  dextrose 50% Injectable 25 Gram(s) IV Push once  dextrose 50% Injectable 12.5 Gram(s) IV Push once  dextrose 50% Injectable 25 Gram(s) IV Push once  diltiazem    Tablet 60 milliGRAM(s) Oral every 8 hours  glucagon  Injectable 1 milliGRAM(s) IntraMuscular once  heparin   Injectable 5000 Unit(s) SubCutaneous every 12 hours  imipramine 50 milliGRAM(s) Oral daily  insulin glargine Injectable (LANTUS) 8 Unit(s) SubCutaneous at bedtime  insulin lispro (ADMELOG) corrective regimen sliding scale   SubCutaneous three times a day before meals  insulin lispro (ADMELOG) corrective regimen sliding scale   SubCutaneous at bedtime  metoprolol tartrate 100 milliGRAM(s) Oral every 12 hours  pantoprazole    Tablet 40 milliGRAM(s) Oral before breakfast  povidone iodine 10% Solution 1 Application(s) Topical daily  sodium chloride 0.9%. 1000 milliLiter(s) (50 mL/Hr) IV Continuous <Continuous>    MEDICATIONS  (PRN):  acetaminophen     Tablet .. 650 milliGRAM(s) Oral every 6 hours PRN Temp greater or equal to 38C (100.4F), Mild Pain (1 - 3)  aluminum hydroxide/magnesium hydroxide/simethicone Suspension 30 milliLiter(s) Oral every 4 hours PRN Dyspepsia  dextrose Oral Gel 15 Gram(s) Oral once PRN Blood Glucose LESS THAN 70 milliGRAM(s)/deciliter  diphenhydrAMINE Injectable 25 milliGRAM(s) IV Push <User Schedule> PRN Combative behavior  melatonin 3 milliGRAM(s) Oral at bedtime PRN Insomnia  ondansetron Injectable 4 milliGRAM(s) IV Push every 8 hours PRN Nausea and/or Vomiting      Allergies    latex (Unknown)  No Known Drug Allergies    Intolerances        REVIEW OF SYSTEMS:  CONSTITUTIONAL: No fever or chills  HEENT:  No headache, no sore throat  RESPIRATORY: No cough, wheezing, or shortness of breath  CARDIOVASCULAR: No chest pain, palpitations, or leg swelling  GASTROINTESTINAL: No abd pain, nausea, vomiting, or diarrhea  GENITOURINARY: No dysuria, frequency, or hematuria  NEUROLOGICAL: no focal weakness or dizziness  MUSCULOSKELETAL: no myalgias     Vital Signs Last 24 Hrs  T(C): 36.3 (21 Jun 2022 04:34), Max: 36.8 (20 Jun 2022 19:55)  T(F): 97.3 (21 Jun 2022 04:34), Max: 98.2 (20 Jun 2022 19:55)  HR: 60 (21 Jun 2022 04:34) (53 - 76)  BP: 127/62 (21 Jun 2022 04:34) (101/60 - 138/81)  BP(mean): --  RR: 18 (21 Jun 2022 04:34) (18 - 20)  SpO2: 95% (21 Jun 2022 04:34) (93% - 100%)    PHYSICAL EXAM:  GENERAL: NAD  HEENT:  NC/AT, EOMI, moist mucous membranes  CHEST/LUNG:  CTA b/l, no rales, wheezes, or rhonchi  HEART:  RRR, S1, S2  ABDOMEN:  BS+, soft, nontender, nondistended  EXTREMITIES: no edema, cyanosis, or calf tenderness  NERVOUS SYSTEM: AA&Ox3    LABS:    CBC Full  -  ( 20 Jun 2022 07:04 )  WBC Count : 9.64 K/uL  Hemoglobin : 11.4 g/dL  Hematocrit : 37.1 %  Platelet Count - Automated : 274 K/uL  Mean Cell Volume : 92.5 fl  Mean Cell Hemoglobin : 28.4 pg  Mean Cell Hemoglobin Concentration : 30.7 gm/dL  Auto Neutrophil # : x  Auto Lymphocyte # : x  Auto Monocyte # : x  Auto Eosinophil # : x  Auto Basophil # : x  Auto Neutrophil % : x  Auto Lymphocyte % : x  Auto Monocyte % : x  Auto Eosinophil % : x  Auto Basophil % : x      Ca    9.1        20 Jun 2022 07:04      PT/INR - ( 20 Jun 2022 12:36 )   PT: 11.4 sec;   INR: 0.97 ratio         PTT - ( 20 Jun 2022 12:36 )  PTT:29.8 sec    CAPILLARY BLOOD GLUCOSE      POCT Blood Glucose.: 133 mg/dL (21 Jun 2022 06:33)  POCT Blood Glucose.: 157 mg/dL (20 Jun 2022 21:08)  POCT Blood Glucose.: 101 mg/dL (20 Jun 2022 19:15)  POCT Blood Glucose.: 173 mg/dL (20 Jun 2022 16:35)  POCT Blood Glucose.: 148 mg/dL (20 Jun 2022 11:18)        Culture - Urine (collected 06-16-22 @ 03:40)  Source: Clean Catch Clean Catch (Midstream)  Final Report (06-17-22 @ 07:32):    <10,000 CFU/mL Normal Urogenital Shantel    Culture - Blood (collected 06-16-22 @ 03:38)  Source: .Blood Blood-Peripheral  Final Report (06-21-22 @ 04:00):    No Growth Final    Culture - Blood (collected 06-16-22 @ 03:38)  Source: .Blood Blood-Peripheral  Final Report (06-21-22 @ 04:00):    No Growth Final        RADIOLOGY & ADDITIONAL TESTS:    Personally reviewed.     Consultant(s) Notes Reviewed:  [x] YES  [ ] NO    Care Discussed with [x] Consultants  [x] Patient  [ ] Family  [ ]      [ ] Other; RN  DVT ppx   Patient is a 71y old  Female who presents with a chief complaint of SIRS (21 Jun 2022 08:31)      INTERVAL HPI/OVERNIGHT EVENTS: Pt seen and examined at bedside. has no complaints.     MEDICATIONS  (STANDING):  amLODIPine   Tablet 5 milliGRAM(s) Oral daily  aspirin enteric coated 81 milliGRAM(s) Oral daily  atorvastatin 40 milliGRAM(s) Oral at bedtime  calcium acetate 667 milliGRAM(s) Oral three times a day with meals  ceFAZolin   IVPB 1000 milliGRAM(s) IV Intermittent once  cefTRIAXone   IVPB 1000 milliGRAM(s) IV Intermittent every 24 hours  cloNIDine 0.1 milliGRAM(s) Oral two times a day  dextrose 5%. 1000 milliLiter(s) (100 mL/Hr) IV Continuous <Continuous>  dextrose 5%. 1000 milliLiter(s) (50 mL/Hr) IV Continuous <Continuous>  dextrose 50% Injectable 25 Gram(s) IV Push once  dextrose 50% Injectable 12.5 Gram(s) IV Push once  dextrose 50% Injectable 25 Gram(s) IV Push once  diltiazem    Tablet 60 milliGRAM(s) Oral every 8 hours  glucagon  Injectable 1 milliGRAM(s) IntraMuscular once  heparin   Injectable 5000 Unit(s) SubCutaneous every 12 hours  imipramine 50 milliGRAM(s) Oral daily  insulin glargine Injectable (LANTUS) 8 Unit(s) SubCutaneous at bedtime  insulin lispro (ADMELOG) corrective regimen sliding scale   SubCutaneous three times a day before meals  insulin lispro (ADMELOG) corrective regimen sliding scale   SubCutaneous at bedtime  metoprolol tartrate 100 milliGRAM(s) Oral every 12 hours  pantoprazole    Tablet 40 milliGRAM(s) Oral before breakfast  povidone iodine 10% Solution 1 Application(s) Topical daily  sodium chloride 0.9%. 1000 milliLiter(s) (50 mL/Hr) IV Continuous <Continuous>    MEDICATIONS  (PRN):  acetaminophen     Tablet .. 650 milliGRAM(s) Oral every 6 hours PRN Temp greater or equal to 38C (100.4F), Mild Pain (1 - 3)  aluminum hydroxide/magnesium hydroxide/simethicone Suspension 30 milliLiter(s) Oral every 4 hours PRN Dyspepsia  dextrose Oral Gel 15 Gram(s) Oral once PRN Blood Glucose LESS THAN 70 milliGRAM(s)/deciliter  diphenhydrAMINE Injectable 25 milliGRAM(s) IV Push <User Schedule> PRN Combative behavior  melatonin 3 milliGRAM(s) Oral at bedtime PRN Insomnia  ondansetron Injectable 4 milliGRAM(s) IV Push every 8 hours PRN Nausea and/or Vomiting      Allergies    latex (Unknown)  No Known Drug Allergies    Intolerances        REVIEW OF SYSTEMS:  CONSTITUTIONAL: No fever or chills  HEENT:  No headache, no sore throat  RESPIRATORY: No cough, wheezing, or shortness of breath  CARDIOVASCULAR: No chest pain, palpitations, or leg swelling  GASTROINTESTINAL: No abd pain, nausea, vomiting, or diarrhea  GENITOURINARY: No dysuria, frequency, or hematuria  NEUROLOGICAL: no focal weakness or dizziness  MUSCULOSKELETAL: no myalgias     Vital Signs Last 24 Hrs  T(C): 36.3 (21 Jun 2022 04:34), Max: 36.8 (20 Jun 2022 19:55)  T(F): 97.3 (21 Jun 2022 04:34), Max: 98.2 (20 Jun 2022 19:55)  HR: 60 (21 Jun 2022 04:34) (53 - 76)  BP: 127/62 (21 Jun 2022 04:34) (101/60 - 138/81)  BP(mean): --  RR: 18 (21 Jun 2022 04:34) (18 - 20)  SpO2: 95% (21 Jun 2022 04:34) (93% - 100%)    PHYSICAL EXAM:  GENERAL: elderly F in NAD  HEENT:  NC/AT, EOMI, moist mucous membranes  CHEST/LUNG: CTA b/l, no rales, wheezes, or rhonchi  HEART:  RRR, S1, S2  ABDOMEN:  BS+, soft, nontender, nondistended  EXTREMITIES: R great hallux w/ necrotic dry gangrene, poor nail hygiene overall  NERVOUS SYSTEM: AA&Ox3    LABS:    CBC Full  -  ( 20 Jun 2022 07:04 )  WBC Count : 9.64 K/uL  Hemoglobin : 11.4 g/dL  Hematocrit : 37.1 %  Platelet Count - Automated : 274 K/uL  Mean Cell Volume : 92.5 fl  Mean Cell Hemoglobin : 28.4 pg  Mean Cell Hemoglobin Concentration : 30.7 gm/dL  Auto Neutrophil # : x  Auto Lymphocyte # : x  Auto Monocyte # : x  Auto Eosinophil # : x  Auto Basophil # : x  Auto Neutrophil % : x  Auto Lymphocyte % : x  Auto Monocyte % : x  Auto Eosinophil % : x  Auto Basophil % : x      Ca    9.1        20 Jun 2022 07:04      PT/INR - ( 20 Jun 2022 12:36 )   PT: 11.4 sec;   INR: 0.97 ratio         PTT - ( 20 Jun 2022 12:36 )  PTT:29.8 sec    CAPILLARY BLOOD GLUCOSE      POCT Blood Glucose.: 133 mg/dL (21 Jun 2022 06:33)  POCT Blood Glucose.: 157 mg/dL (20 Jun 2022 21:08)  POCT Blood Glucose.: 101 mg/dL (20 Jun 2022 19:15)  POCT Blood Glucose.: 173 mg/dL (20 Jun 2022 16:35)  POCT Blood Glucose.: 148 mg/dL (20 Jun 2022 11:18)        Culture - Urine (collected 06-16-22 @ 03:40)  Source: Clean Catch Clean Catch (Midstream)  Final Report (06-17-22 @ 07:32):    <10,000 CFU/mL Normal Urogenital Shantel    Culture - Blood (collected 06-16-22 @ 03:38)  Source: .Blood Blood-Peripheral  Final Report (06-21-22 @ 04:00):    No Growth Final    Culture - Blood (collected 06-16-22 @ 03:38)  Source: .Blood Blood-Peripheral  Final Report (06-21-22 @ 04:00):    No Growth Final        RADIOLOGY & ADDITIONAL TESTS:    Personally reviewed.     Consultant(s) Notes Reviewed:  [x] YES  [ ] NO    Care Discussed with [x] Consultants  [x] Patient  [ ] Family  [ ]      [ ] Other; RN  DVT ppx

## 2022-06-21 NOTE — PROGRESS NOTE ADULT - ASSESSMENT
72 y/o Female patient S/P right femoral popliteal bypass with graft.    Plan:  - ICU care, continue Q 1 hour vascular checks  - pain control  - REG diet  - serial exams  - Resume SQ Heparin, plan to continue ASA 81 mg and Plavix tmrw    Surgical Team Contact Information  Spectralink: Ext: [phone] or [phone]  Pager: [phone]

## 2022-06-21 NOTE — PROGRESS NOTE ADULT - ASSESSMENT
pt with hypoglycemia  elevated troponin due to renal failure  ashd , s/p mi  s/p coronary stent  s/p cabg  [id number]  esrd - on hd  dm2  hypertension  paf -not on oac - fall risk  pvd - s/p stent  dyslipidemia   chf - compensated - recent coronary angiogram - patent graft -pt's cradiac status stable low  to intermediate risk for  pvd surgery and amputation of great  toe if needed  6/21

## 2022-06-21 NOTE — CONSULT NOTE ADULT - ASSESSMENT
Assessment:  Patient is a 71 year old female with PMH of A.fib rate controlled not on AC for hx of multiple falls, T2DM, HTN, HLD, ESRD on HD, CHF, s/p CABG brought in by EMS for generalized weakness at home. Patient states that she was doing well when in the afternoon she tried to get up from her couch and felt weak in the LE and fell back in her couch. Denies any hx of head trauma or loss of consciousness. Denies any weakness or numbness. Denies any chest pain, SOB or palpitations. No fever, cough or chills. No hx of recent travel or sick contacts. At the time of EMS arrival patient was found to have POCBS of 50. EMS gave dextrose and on arrival to the ED patient blood sugar was found to be in 30's with hypothermia of 94.9.    Problem List:  1. PVD s/p Fem pop bypass, POD # 0  2. ESRD on HD  3. SIRS   4. osteomyelitis of Left medial malleolus, klebsiella oxytoca/raoutella oxytoca, E. coli, MSSA   5. DM  6. CHF    Plan:  Neuro: q1hr NV checks. Pain control   CV: Monitor HR and BP. C/w Cardizem, metoprolol, ASA, Plavix, Clonidine, Norvasc, and statin. HD stable   Pulm: No acute issues   Renal: ESRD on HD. Dialysis per nephro. Strict I/Os, goal UOP >0.5cc/kg/hr. Trend renal function and electrolytes, replete as needed. Avoid nephrotoxic agents  GI: PPI, NPO  ID: Hx of Left medial malleolus growing klebsiella oxytoca/raoutella oxytoca, E. coli, MSSA. Recent blood cultures negative. On Rocephin per ID. Amputation planned for 06/23/2022. Trend WBC/fevers/procalcitonin    Heme: HSQ for DVT prophylaxis. Monitor for signs and symptoms of hemorrhage, trend H&H    Endo: Goal blood glucose <180. C/w Lantus and ISS      Assessment:  Patient is a 71 year old female with PMH of A.fib rate controlled not on AC for hx of multiple falls, T2DM, HTN, HLD, ESRD on HD, CHF, s/p CABG brought in by EMS for generalized weakness at home. Patient states that she was doing well when in the afternoon she tried to get up from her couch and felt weak in the LE and fell back in her couch. Denies any hx of head trauma or loss of consciousness. Denies any weakness or numbness. Denies any chest pain, SOB or palpitations. No fever, cough or chills. No hx of recent travel or sick contacts. At the time of EMS arrival patient was found to have POCBS of 50. EMS gave dextrose and on arrival to the ED patient blood sugar was found to be in 30's with hypothermia of 94.9.    Problem List:  1. PVD s/p Fem pop bypass, POD # 0  2. ESRD on HD  3. SIRS   4. Osteomyelitis of left hallux. Previous OM of Left medial malleolus, klebsiella oxytoca/raoutella oxytoca, E. coli, MSSA   5. DM  6. CHF    Plan:  Neuro: q1hr NV checks. Pain control   CV: Monitor HR and BP. C/w Cardizem, metoprolol, ASA, Plavix, Clonidine, Norvasc, and statin. HD stable   Pulm: No acute issues   Renal: ESRD on HD. Dialysis per nephro. Strict I/Os, goal UOP >0.5cc/kg/hr. Trend renal function and electrolytes, replete as needed. Avoid nephrotoxic agents  GI: PPI, NPO  ID: Hx of Left medial malleolus growing klebsiella oxytoca/raoutella oxytoca, E. coli, MSSA. Recent blood cultures negative. On Rocephin per ID. Amputation planned for 06/23/2022. Trend WBC/fevers/procalcitonin    Heme: HSQ for DVT prophylaxis. Monitor for signs and symptoms of hemorrhage, trend H&H    Endo: Goal blood glucose <180. C/w Lantus and ISS      Assessment:  Patient is a 71 year old female with PMH of A.fib rate controlled not on AC for hx of multiple falls, T2DM, HTN, HLD, ESRD on HD, CHF, s/p CABG brought in by EMS for generalized weakness at home. Patient states that she was doing well when in the afternoon she tried to get up from her couch and felt weak in the LE and fell back in her couch. Denies any hx of head trauma or loss of consciousness. Denies any weakness or numbness. Denies any chest pain, SOB or palpitations. No fever, cough or chills. No hx of recent travel or sick contacts. At the time of EMS arrival patient was found to have POCBS of 50. EMS gave dextrose and on arrival to the ED patient blood sugar was found to be in 30's with hypothermia of 94.9.    Problem List:  1. PVD s/p Fem pop bypass, POD # 0  2. ESRD on HD  3. SIRS   4. Osteomyelitis of Right hallux. Previous OM of Left medial malleolus, klebsiella oxytoca/raoutella oxytoca, E. coli, MSSA   5. DM  6. CHF    Plan:  Neuro: q1hr NV checks. Pain control   CV: Monitor HR and BP. C/w Cardizem, metoprolol, ASA, Plavix, Clonidine, Norvasc, and statin. HD stable   Pulm: No acute issues   Renal: ESRD on HD. Dialysis per nephro. Strict I/Os, goal UOP >0.5cc/kg/hr. Trend renal function and electrolytes, replete as needed. Avoid nephrotoxic agents  GI: PPI, NPO  ID: Hx of Left medial malleolus growing klebsiella oxytoca/raoutella oxytoca, E. coli, MSSA. Recent blood cultures negative. On Rocephin per ID. Amputation planned for 06/23/2022. Trend WBC/fevers/procalcitonin    Heme: HSQ for DVT prophylaxis. Monitor for signs and symptoms of hemorrhage, trend H&H    Endo: Goal blood glucose <180. C/w Lantus and ISS

## 2022-06-21 NOTE — PROGRESS NOTE ADULT - SUBJECTIVE AND OBJECTIVE BOX
Post Operative Note  Patient: SHILO KOHLER 71y (1950) Female   MRN: [id number]  [facility]A  Visit: 06-15-22 Inpatient  Date: 06-21-22 @ 23:07    Procedure: S/P right femoral popliteal bypass with graft.    Subjective: vital signs stable  Patient seen and examined at bedside in ICU.  No reported events post-operatively.  Patient with no new complaints. She reports mild pain at her right groin. Denies paresthesias or paralysis.    Objective:  Vitals: T(F): 97.3 (06-21-22 @ 20:57), Max: 98.1 (06-21-22 @ 10:35)  HR: 61 (06-21-22 @ 22:00)  BP: 123/58 (06-21-22 @ 22:00) (107/51 - 144/56)  RR: 17 (06-21-22 @ 22:00)  SpO2: 100% (06-21-22 @ 22:00)    In:   06-20-22 @ 07:01  -  06-21-22 @ 07:00  --------------------------------------------------------  IN: 50 mL    IV Fluids: calcium acetate 667 milliGRAM(s) Oral three times a day with meals  dextrose 5%. 1000 milliLiter(s) (50 mL/Hr) IV Continuous <Continuous>  dextrose 5%. 1000 milliLiter(s) (100 mL/Hr) IV Continuous <Continuous>  sodium chloride 0.9%. 1000 milliLiter(s) (50 mL/Hr) IV Continuous <Continuous>    PHYSICAL EXAM:  GENERAL: No acute distress, well-developed  HEAD:  Atraumatic, Normocephalic  L/R GROIN: prevena vac in place over right leg incisions, no signs of swelling, surrounding tissue soft, no signs of erythema or fluid collection near right groin  LOWER EXTREM: DP & PT nonpalpable bilaterally, Capillary refill >2sec b/l.  No pedal hair b/l. Dopplers reveal DP and PT pulses b/l.  DERM: R great toe distal tip dry gangrene, macerated base and periwound erythema, 2nd toe distal with dry gangrene. L inner malleolar with 2x2 cm circular wound with granulated base and hyperkeritotic edges; no pallor noted  MSK:  4/5 strength at all muscle groups crossing ankle joint b/l.  NEURO: Grossly intact, however diminished around right great toe and right 2nd digit    Medications: [Standing]  acetaminophen     Tablet .. 1000 milliGRAM(s) Oral every 6 hours  aluminum hydroxide/magnesium hydroxide/simethicone Suspension 30 milliLiter(s) Oral every 4 hours PRN  amLODIPine   Tablet 5 milliGRAM(s) Oral daily  atorvastatin 40 milliGRAM(s) Oral at bedtime  calcium acetate 667 milliGRAM(s) Oral three times a day with meals  cefTRIAXone   IVPB 1000 milliGRAM(s) IV Intermittent every 24 hours  chlorhexidine 4% Liquid 1 Application(s) Topical <User Schedule>  cloNIDine 0.1 milliGRAM(s) Oral two times a day  dextrose 5%. 1000 milliLiter(s) IV Continuous <Continuous>  dextrose 5%. 1000 milliLiter(s) IV Continuous <Continuous>  dextrose 50% Injectable 25 Gram(s) IV Push once  dextrose 50% Injectable 12.5 Gram(s) IV Push once  dextrose 50% Injectable 25 Gram(s) IV Push once  dextrose Oral Gel 15 Gram(s) Oral once PRN  diltiazem    Tablet 60 milliGRAM(s) Oral every 8 hours  diphenhydrAMINE Injectable 25 milliGRAM(s) IV Push <User Schedule> PRN  glucagon  Injectable 1 milliGRAM(s) IntraMuscular once  heparin   Injectable 5000 Unit(s) SubCutaneous every 12 hours  HYDROmorphone  Injectable 1 milliGRAM(s) IV Push every 4 hours PRN  HYDROmorphone  Injectable 0.5 milliGRAM(s) IV Push every 4 hours PRN  imipramine 50 milliGRAM(s) Oral daily  insulin glargine Injectable (LANTUS) 8 Unit(s) SubCutaneous at bedtime  insulin lispro (ADMELOG) corrective regimen sliding scale   SubCutaneous three times a day before meals  insulin lispro (ADMELOG) corrective regimen sliding scale   SubCutaneous at bedtime  melatonin 3 milliGRAM(s) Oral at bedtime PRN  metoprolol tartrate 100 milliGRAM(s) Oral every 12 hours  ondansetron Injectable 4 milliGRAM(s) IV Push every 8 hours PRN  pantoprazole    Tablet 40 milliGRAM(s) Oral before breakfast  povidone iodine 10% Solution 1 Application(s) Topical daily  sodium chloride 0.9%. 1000 milliLiter(s) IV Continuous <Continuous>    Medications: [PRN]  acetaminophen     Tablet .. 1000 milliGRAM(s) Oral every 6 hours  aluminum hydroxide/magnesium hydroxide/simethicone Suspension 30 milliLiter(s) Oral every 4 hours PRN  amLODIPine   Tablet 5 milliGRAM(s) Oral daily  atorvastatin 40 milliGRAM(s) Oral at bedtime  calcium acetate 667 milliGRAM(s) Oral three times a day with meals  cefTRIAXone   IVPB 1000 milliGRAM(s) IV Intermittent every 24 hours  chlorhexidine 4% Liquid 1 Application(s) Topical <User Schedule>  cloNIDine 0.1 milliGRAM(s) Oral two times a day  dextrose 5%. 1000 milliLiter(s) IV Continuous <Continuous>  dextrose 5%. 1000 milliLiter(s) IV Continuous <Continuous>  dextrose 50% Injectable 25 Gram(s) IV Push once  dextrose 50% Injectable 12.5 Gram(s) IV Push once  dextrose 50% Injectable 25 Gram(s) IV Push once  dextrose Oral Gel 15 Gram(s) Oral once PRN  diltiazem    Tablet 60 milliGRAM(s) Oral every 8 hours  diphenhydrAMINE Injectable 25 milliGRAM(s) IV Push <User Schedule> PRN  glucagon  Injectable 1 milliGRAM(s) IntraMuscular once  heparin   Injectable 5000 Unit(s) SubCutaneous every 12 hours  HYDROmorphone  Injectable 1 milliGRAM(s) IV Push every 4 hours PRN  HYDROmorphone  Injectable 0.5 milliGRAM(s) IV Push every 4 hours PRN  imipramine 50 milliGRAM(s) Oral daily  insulin glargine Injectable (LANTUS) 8 Unit(s) SubCutaneous at bedtime  insulin lispro (ADMELOG) corrective regimen sliding scale   SubCutaneous three times a day before meals  insulin lispro (ADMELOG) corrective regimen sliding scale   SubCutaneous at bedtime  melatonin 3 milliGRAM(s) Oral at bedtime PRN  metoprolol tartrate 100 milliGRAM(s) Oral every 12 hours  ondansetron Injectable 4 milliGRAM(s) IV Push every 8 hours PRN  pantoprazole    Tablet 40 milliGRAM(s) Oral before breakfast  povidone iodine 10% Solution 1 Application(s) Topical daily  sodium chloride 0.9%. 1000 milliLiter(s) IV Continuous <Continuous>    Labs:                        12.4   17.74 )-----------( 336      ( 21 Jun 2022 21:00 )             41.2     06-21    134<L>  |  99  |  25<H>  ----------------------------<  188<H>  4.5   |  24  |  3.90<H>    Ca    8.7      21 Jun 2022 21:00  Phos  4.1     06-21  Mg     2.4     06-21    TPro  6.4  /  Alb  2.6<L>  /  TBili  0.4  /  DBili  x   /  AST  18  /  ALT  17  /  AlkPhos  101  06-20    PT/INR - ( 20 Jun 2022 12:36 )   PT: 11.4 sec;   INR: 0.97 ratio    PTT - ( 20 Jun 2022 12:36 )  PTT:29.8 sec    Imaging:  No post-op imaging studies

## 2022-06-21 NOTE — PROGRESS NOTE ADULT - NS PANP COMMENT GEN_ALL_CORE FT
Patient evaluated at the bedside. R toe gangrene. CTA reviewed. R SFA segmental occl and fem fem anastomosis stenosis. No good endovascular option. Will perform R fem pop bypass. Patient aware and agrees.

## 2022-06-21 NOTE — PROGRESS NOTE ADULT - SUBJECTIVE AND OBJECTIVE BOX
Patient is a 71y Female whom presented to the hospital with esrd on hd     PAST MEDICAL & SURGICAL HISTORY:  Diabetes mellitus II      HTN (hypertension)      h/o Anxiety attack      Depression      h/o Myocardial infarct 2007      CAD (coronary artery disease)      h/o Hepatitis A 1969  currently resolved, no symptoms      PAD (peripheral artery disease)      Murmur, cardiac      h/o Smoking  quitted 3/2012      CRF (chronic renal failure), unspecified stage      Dialysis patient      Anemia secondary to renal failure      HTN (hypertension)      coronary stent 2007      s/p Ovarian cyst removal      s/p surgical removal of benign Skin lesion epigastric area          MEDICATIONS  (STANDING):  amLODIPine   Tablet 5 milliGRAM(s) Oral daily  aspirin enteric coated 81 milliGRAM(s) Oral daily  atorvastatin 40 milliGRAM(s) Oral at bedtime  calcium acetate 667 milliGRAM(s) Oral three times a day with meals  cefTRIAXone   IVPB 1000 milliGRAM(s) IV Intermittent every 24 hours  cloNIDine 0.1 milliGRAM(s) Oral two times a day  clopidogrel Tablet 75 milliGRAM(s) Oral daily  dextrose 5%. 1000 milliLiter(s) (100 mL/Hr) IV Continuous <Continuous>  dextrose 5%. 1000 milliLiter(s) (50 mL/Hr) IV Continuous <Continuous>  dextrose 50% Injectable 25 Gram(s) IV Push once  dextrose 50% Injectable 12.5 Gram(s) IV Push once  dextrose 50% Injectable 25 Gram(s) IV Push once  diltiazem    Tablet 60 milliGRAM(s) Oral every 8 hours  glucagon  Injectable 1 milliGRAM(s) IntraMuscular once  heparin   Injectable 5000 Unit(s) SubCutaneous every 12 hours  imipramine 50 milliGRAM(s) Oral daily  insulin glargine Injectable (LANTUS) 12 Unit(s) SubCutaneous at bedtime  insulin lispro (ADMELOG) corrective regimen sliding scale   SubCutaneous three times a day before meals  insulin lispro (ADMELOG) corrective regimen sliding scale   SubCutaneous at bedtime  metoprolol tartrate 100 milliGRAM(s) Oral every 12 hours  pantoprazole    Tablet 40 milliGRAM(s) Oral before breakfast  povidone iodine 10% Solution 1 Application(s) Topical daily      Allergies    latex (Unknown)  No Known Drug Allergies    Intolerances        SOCIAL HISTORY:  Denies ETOh,Smoking,     FAMILY HISTORY:  No pertinent family history in first degree relatives        REVIEW OF SYSTEMS:    CONSTITUTIONAL: No weakness, fevers or chills  RESPIRATORY: No cough, wheezing, hemoptysis; No shortness of breath  CARDIOVASCULAR: No chest pain or palpitations  GASTROINTESTINAL: No abdominal or epigastric pain. No nausea, vomiting,     No diarrhea or constipation. No melena   SKIN: dry                                                    PHYSICAL EXAM:    Constitutional: NAD  HEENT: conjunctive   clear   Neck:  No JVD  Respiratory: CTAB  Cardiovascular: S1 and S2  Gastrointestinal: BS+, soft, NT/ND  Extremities: No peripheral edema  , pos toe bandage   : No Renteria  Skin: No rashes                        12.2   10.18 )-----------( 278      ( 21 Jun 2022 09:22 )             39.5       CBC Full  -  ( 21 Jun 2022 09:22 )  WBC Count : 10.18 K/uL  RBC Count : 4.27 M/uL  Hemoglobin : 12.2 g/dL  Hematocrit : 39.5 %  Platelet Count - Automated : 278 K/uL  Mean Cell Volume : 92.5 fl  Mean Cell Hemoglobin : 28.6 pg  Mean Cell Hemoglobin Concentration : 30.9 gm/dL  Auto Neutrophil # : 6.85 K/uL  Auto Lymphocyte # : 1.31 K/uL  Auto Monocyte # : 1.59 K/uL  Auto Eosinophil # : 0.25 K/uL  Auto Basophil # : 0.07 K/uL  Auto Neutrophil % : 67.2 %  Auto Lymphocyte % : 12.9 %  Auto Monocyte % : 15.6 %  Auto Eosinophil % : 2.5 %  Auto Basophil % : 0.7 %      06-20    137  |  96  |  32<H>  ----------------------------<  163<H>  4.9   |  32<H>  |  4.80<H>    Ca    9.1      20 Jun 2022 07:04    TPro  6.4  /  Alb  2.6<L>  /  TBili  0.4  /  DBili  x   /  AST  18  /  ALT  17  /  AlkPhos  101  06-20      CAPILLARY BLOOD GLUCOSE      POCT Blood Glucose.: 142 mg/dL (21 Jun 2022 18:00)  POCT Blood Glucose.: 132 mg/dL (21 Jun 2022 11:44)  POCT Blood Glucose.: 133 mg/dL (21 Jun 2022 06:33)  POCT Blood Glucose.: 157 mg/dL (20 Jun 2022 21:08)      Vital Signs Last 24 Hrs  T(C): 36.3 (21 Jun 2022 18:30), Max: 36.7 (21 Jun 2022 10:35)  T(F): 97.3 (21 Jun 2022 18:30), Max: 98.1 (21 Jun 2022 10:35)  HR: 65 (21 Jun 2022 20:30) (55 - 66)  BP: 123/44 (21 Jun 2022 20:30) (107/51 - 144/56)  BP(mean): --  RR: 12 (21 Jun 2022 20:30) (12 - 18)  SpO2: 100% (21 Jun 2022 20:30) (95% - 100%)        PT/INR - ( 20 Jun 2022 12:36 )   PT: 11.4 sec;   INR: 0.97 ratio         PTT - ( 20 Jun 2022 12:36 )  PTT:29.8 secAccess: pos fistula

## 2022-06-21 NOTE — PROGRESS NOTE ADULT - ASSESSMENT
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 Lantus dec to 10 units qhs by endocrine. HD per nephro. Trops downtrended, was likely elevated from ESRD.    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 Lantus dec to 10 units qhs by endocrine. HD per nephro. Trops downtrended, was likely elevated from ESRD. will need cardiac clearance prior to any procedure or intervention.    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 Lantus dec to 10 units qhs by endocrine. HD per nephro. Trops downtrended, was likely elevated from ESRD. will need cardiac clearance -- noted. Plan for RLE revascularization tomorrow w/ vasc team. NPO after MN, active T+S, blood products on hold, will have HD session today. Likely podiatric intervention Thurs or Fri reg amputation of great hallux. PLAVIX HELD, RESUME POST PROCEDURE IF cleared by [facility]    6/21/22 - Pt is an RCRI Class IV risk candidate going for intermediate risk procedure, and is medically optimized for this procedure. Will need close intraop cardiac monitoring. Cardiac clearance noted. Labs, VS reviewed.      Problem/Plan - 1:  ·  Problem: Hypoglycemia.  ·  Plan: - Patient presented with c/o generalized weakness and fall and was found to have blood sugar in 30's  - S/p Dextrose 50% IV X1 in the ED and D5 NS 1L X1 with appropriate response  - Patient with hx of T2DM, on Lantus 40 units qhs not on any oral hypoglycemics per outpatient pharmacy   - Guicho paz ac/hs  - endocrine consulted.     Problem/Plan - 2:  ·  Problem: SIRS (systemic inflammatory response syndrome).  ·  Plan: - Patient presented to the ED with the c/o Generalized weakness and possible fall and was found to have hypothermia 94.9 and leukocytosis of 16.82  - Patient meets SIRS criteria -   - UA unremarkable, no abdominal pain, CXR with no infiltrates seen on wet read   - S/P ceftriaxone in the ED - will monitor off Abx  - Patient was found to be hypoglycemic on arrival to the ED with POCS in 30's  - Hypothermia and leukocytosis likely reactive in the setting of hypoglycemia  - S/p Dextrose 50% IV X1 in the ED and D5 NS 1L X1 with appropriate response  - will monitor off Abx pending ID recommendations   - CT BRAIN: No acute intracranial bleeding. Central volume loss, chronic microvascular ischemic changes, and chronic bilateral lacunar infarctions.  - CT CERVICAL SPINE: No fracture. Grade 1 C4-C5 anterolisthesis. C6-C7 disc degeneration. Bilateral pleural effusions and interlobular septal thickening due to interstitial edema.  - CXR: no clear evidence of PNA  - F/U CT chest   - Trend WBC and monitor for fever  - F/u UA, UCx, BCx x2  - Lactate 2 on admission, f/u repeat lactate   - ID Dr. Tsai consulted, will appreciate recs.     Problem/Plan - 3:  ·  Problem: Pleural effusion.   ·  Plan: - No Hx of pulmonary disease  - Patient does not c/o cough, fever or chills  - CT Cervical shoes Bilateral pleural effusions and interlobular septal thickening due to interstitial edema.  - F/U dedicated CT chest  - s/p ceftriaxone in the ED - will monitor off Abx pending  ct results  - ID Dr. Tsai, consulted, will appreciate recs.     Problem/Plan - 4:  ·  Problem: HFrEF (heart failure with reduced ejection fraction).   ·  Plan: - patient with hx of HFrEF  - last ECHO in 04/22 shows EF of 45%  - Admission labs show ProBNP of [id number]  - Likely elevated in the setting of ESRD  - Consult Dr. James, will appreciate recs   - Avoid excessive fluids.     Problem/Plan - 5:  ·  Problem: Elevated troponin.   ·  Plan: - Admission labs show elevated Troponin of 275.9  - Patient denies any chest pain, sob or palpitations  - EKG shows NSR with left axis deviation with non specific ST and T waves changes  - elevated troponin likely due to ESRD  - trend x3 or to peak.     Problem/Plan - 6:  ·  Problem: ESRD on hemodialysis.   ·  Plan: - Patient with hx of ESRD  - On HD TTS schedule  - admission eGFR 9  - Follows Dr. Edwards as out patient, will consult Dr. Edwards, appreciate recs.     Problem/Plan - 7:  ·  Problem: T2DM (type 2 diabetes mellitus).   ·  Plan: - Chronic stable  - On Lantus 40 qhs  - Will start on lantus 20 units qhs with LDSS given hypoglycemic episode  - Accu checks AC/HS  - F/U A1C  - Adjust insulin requirement based on blood sugar levels  - per outpatient pharmacy patient recently rx ademolog however has not yet picked up and pharmacy unsure of dose   - endocrinology consulted, Dr. Perlman.     Problem/Plan - 8:  ·  Problem: Atrial fibrillation.   ·  Plan: - Patient with hx of P A.Fib  - Not on any AC because of hx of multiple falls   - on Lopressor 100mg q12 and Diltiazem 60mg q8 - will continue with hold parameters  - EKG shows NSR with left axis deviation with non specific ST and T waves changes.     Problem/Plan - 9:  ·  Problem: Benign essential HTN.   ·  Plan: - Chronic stable  - VSS  - Continue Amlodipine 5 mg with hold parameters  - Dash/Renal Diet  - continue to monitor routine hemodynamics.     Problem/Plan - 10:  ·  Problem: HLD (hyperlipidemia).   ·  Plan; - Chronic stable  - On atorvastatin 40mg at home - will continue  - Dash/Renal diet.     Problem/Plan - 11:  ·  Problem: Depression, major.   ·  Plan: - Chronic stable  - Continue imipramine 50qhs.     Problem/Plan - 12:  ·  Problem: Need for prophylactic measure.   ·  Plan: - DVT Prophylaxis: Heparin 5000 units q12. 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 Lantus dec to 10 units qhs by endocrine. HD per nephro. Trops downtrended, was likely elevated from ESRD.    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 Lantus dec to 10 units qhs by endocrine. HD per nephro. Trops downtrended, was likely elevated from ESRD. will need cardiac clearance prior to any procedure or intervention.    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 Lantus dec to 10 units qhs by endocrine. HD per nephro. Trops downtrended, was likely elevated from ESRD. will need cardiac clearance -- noted. Plan for RLE revascularization tomorrow w/ vasc team. NPO after MN, active T+S, blood products on hold, will have HD session today. Likely podiatric intervention Thurs or Fri reg amputation of great hallux. PLAVIX HELD, RESUME POST PROCEDURE IF cleared by [facility]    6/21/22 - Pt is an RCRI Class IV risk candidate going for intermediate risk procedure, and is medically optimized for this procedure. Will need close intraop cardiac monitoring. Cardiac clearance noted. Labs, VS reviewed. ECG w/ TWI in few precordial leads, cardio following. tentative plan for great toe amp Thurs or Fri, pods following. C/w Rocephin per ID. Continue holding Plavix in anticipation of potential amputation per pods.  *could not reach [facility] contact by phone     Problem/Plan - 1:  ·  Problem: Hypoglycemia.  ·  Plan: - Patient presented with c/o generalized weakness and fall and was found to have blood sugar in 30's  - S/p Dextrose 50% IV X1 in the ED and D5 NS 1L X1 with appropriate response  - Patient with hx of T2DM, on Lantus 40 units qhs not on any oral hypoglycemics per outpatient pharmacy   - Guicho paz ac/hs  - endocrine consulted.     Problem/Plan - 2:  ·  Problem: SIRS (systemic inflammatory response syndrome).  ·  Plan: - Patient presented to the ED with the c/o Generalized weakness and possible fall and was found to have hypothermia 94.9 and leukocytosis of 16.82  - Patient meets SIRS criteria -   - UA unremarkable, no abdominal pain, CXR with no infiltrates seen on wet read   - S/P ceftriaxone in the ED - will monitor off Abx  - Patient was found to be hypoglycemic on arrival to the ED with POCS in 30's  - Hypothermia and leukocytosis likely reactive in the setting of hypoglycemia  - S/p Dextrose 50% IV X1 in the ED and D5 NS 1L X1 with appropriate response  - will monitor off Abx pending ID recommendations   - CT BRAIN: No acute intracranial bleeding. Central volume loss, chronic microvascular ischemic changes, and chronic bilateral lacunar infarctions.  - CT CERVICAL SPINE: No fracture. Grade 1 C4-C5 anterolisthesis. C6-C7 disc degeneration. Bilateral pleural effusions and interlobular septal thickening due to interstitial edema.  - CXR: no clear evidence of PNA  - F/U CT chest   - Trend WBC and monitor for fever  - F/u UA, UCx, BCx x2  - Lactate 2 on admission, f/u repeat lactate   - ID Dr. Tsai consulted, will appreciate recs.     Problem/Plan - 3:  ·  Problem: Pleural effusion.   ·  Plan: - No Hx of pulmonary disease  - Patient does not c/o cough, fever or chills  - CT Cervical shoes Bilateral pleural effusions and interlobular septal thickening due to interstitial edema.  - F/U dedicated CT chest  - s/p ceftriaxone in the ED - will monitor off Abx pending  ct results  - ID Dr. Tsai, consulted, will appreciate recs.     Problem/Plan - 4:  ·  Problem: HFrEF (heart failure with reduced ejection fraction).   ·  Plan: - patient with hx of HFrEF  - last ECHO in 04/22 shows EF of 45%  - Admission labs show ProBNP of [id number]  - Likely elevated in the setting of ESRD  - Consult Dr. James, will appreciate recs   - Avoid excessive fluids.     Problem/Plan - 5:  ·  Problem: Elevated troponin.   ·  Plan: - Admission labs show elevated Troponin of 275.9  - Patient denies any chest pain, sob or palpitations  - EKG shows NSR with left axis deviation with non specific ST and T waves changes  - elevated troponin likely due to ESRD  - trend x3 or to peak.     Problem/Plan - 6:  ·  Problem: ESRD on hemodialysis.   ·  Plan: - Patient with hx of ESRD  - On HD TTS schedule  - admission eGFR 9  - Follows Dr. Edwards as out patient, will consult Dr. Edwards, appreciate recs.     Problem/Plan - 7:  ·  Problem: T2DM (type 2 diabetes mellitus).   ·  Plan: - Chronic stable  - On Lantus 40 qhs  - Will start on lantus 20 units qhs with LDSS given hypoglycemic episode  - Accu checks AC/HS  - F/U A1C  - Adjust insulin requirement based on blood sugar levels  - per outpatient pharmacy patient recently rx ademolog however has not yet picked up and pharmacy unsure of dose   - endocrinology consulted, Dr. Perlman.     Problem/Plan - 8:  ·  Problem: Atrial fibrillation.   ·  Plan: - Patient with hx of P A.Fib  - Not on any AC because of hx of multiple falls   - on Lopressor 100mg q12 and Diltiazem 60mg q8 - will continue with hold parameters  - EKG shows NSR with left axis deviation with non specific ST and T waves changes.     Problem/Plan - 9:  ·  Problem: Benign essential HTN.   ·  Plan: - Chronic stable  - VSS  - Continue Amlodipine 5 mg with hold parameters  - Dash/Renal Diet  - continue to monitor routine hemodynamics.     Problem/Plan - 10:  ·  Problem: HLD (hyperlipidemia).   ·  Plan; - Chronic stable  - On atorvastatin 40mg at home - will continue  - Dash/Renal diet.     Problem/Plan - 11:  ·  Problem: Depression, major.   ·  Plan: - Chronic stable  - Continue imipramine 50qhs.     Problem/Plan - 12:  ·  Problem: Need for prophylactic measure.   ·  Plan: - DVT Prophylaxis: Heparin 5000 units q12. X Size Of Lesion In Cm (Optional): 0 Detail Level: Simple Body Location Override (Optional - Billing Will Still Be Based On Selected Body Map Location If Applicable): Central lower back

## 2022-06-21 NOTE — PROGRESS NOTE ADULT - SUBJECTIVE AND OBJECTIVE BOX
Pt seen and examined. For OR today for R fem-distal bypass with Dr Miller  No acute events overnight. Had HD yesterday. Remains NPO without c/o.    Risk Factors for PAD       Age: 71       Uncontrolled DM: A1C 9.0       Smoking History: Former       Hyperlipidemia: Yes       Hypertension: Yes       Family History of PAD: N/A       Known Atherosclerotic Disease (coronary, carotid, subclavian, renal, mesenteric, AAA): S/P CABG; S/P bifem bypass; S/P L fem-BK pop bypass    Subjective Complaints:        Blane Class: IV       Impaired Walking Function: freq falls       Ischemic Rest Pain: N/A           Objective Findings:        Lower Extremity Pulses: Unable to palpate DP pulses, PT doppler pulse noted.       Lower Extremity Gangrene: R hallux         Vascular Testing:        TOMMY: < from: [facility] Physiol Extremity Lower 3+ Level, RT (06.17.22 @ 10:43) >  ACC: [id number] EXAM:  PHYSIOL EXTREM LOW 3+ LEV RT                          PROCEDURE DATE:  06/17/2022          INTERPRETATION:  History: Right foot gangrene with diminished pulses.    Evaluation may be affected by vessel calcification and patient movement   as reported by technologist.    Left brachial pressure is 120 mmHg. Right brachial pressure could not be   obtained due to presence of AV fistula. Additional segmental pressures   could not be obtained due to limitations of patient movementand vessel   noncompressibility. As such, right TOMMY an TBI cannot be calculated.    Diminished amplitude of pulse volume recordings, progressively worse   towards the distal right lower extremity along the ankle and metatarsal   levels. Nonpulsatile waveform of the right digital level. Findings may   reflect right lower extremity arterial disease, though nonlocalized based   on this study.    Impression:    Limited study.    TOMMY and TBI could not be obtained due to vessel noncompressibility.    Diminished amplitude of pulse volume recordings, progressively worse   towards the distal right lower extremity along the ankle and metatarsal   levels. Nonpulsatile waveform of the right digital level. Findings may   reflect right lower extremity arterial disease, though nonlocalized based   on this study. Correlate with arterial duplex ultrasound or CTA with   lower extremity runoff for characterization.    Given the history of right foot gangrene, correlate for any clinical   signs and symptoms of acute lower extremity ischemia.    < end of copied text >         Arterial Dopplers: < from: US Duplex Arterial Lower Ext Compl, Bilateral (04.06.22 @ 14:05) >  ACC: [id number] EXAM:  US DPLX LWR EXT ARTS COMPL BI                          PROCEDURE DATE:  04/06/2022          INTERPRETATION:  CLINICAL INDICATION: Lower extremity wounds. Evaluate   for lower extremity arterial pathology.    EXAMINATION: Bilateral lower extremity arterial duplex ultrasound    COMPARISON: None    FINDINGS:    Limited evaluation as noted by technologist due to patient motion.    Bilateral lower extremity atheromatous disease.    Progressively delayed upstroke of right common femoral, superficial   femoral, and popliteal arteries noted. The possibility of inflow   significant stenosis cannot be excluded. Trifurcation arteries are not   visualized due to patient motion. Right dorsalis pedis artery could not   be assessed due to overlying bandage material.    Incompletely imaged bypass graft of left left lower extremity, which   demonstrates antegrade flow. The full extent of the bypass graft is not   adequately assessed on this study. The left superficial femoral artery  demonstrates minimal flow. Trifurcation arteries are not visualized due   to patient motion.    IMPRESSION:    Markedly limited study.    Concern for right lower iliac artery extremity inflow disease.    Incompletely visualized bypass graft of leftlower extremity with   antegrade flow.      < end of copied text >         CTA: < from: CT Angio Abd Aorta w/run-off w/ IV Cont (04.08.22 @ 09:49) >  ACC: [id number] EXAM:  CT ANGIO ABD AOR W RUN(W)AW IC                          PROCEDURE DATE:  04/08/2022          INTERPRETATION:  CLINICAL INFORMATION: Right lower extremity iliac   disease. Right lower extremity bypass graft.    COMPARISON: CT chest 12/12/2019 and CTA runoff 8/28/2012. Arterial   Doppler 4/6/2022.    CONTRAST/COMPLICATIONS:  IV Contrast: Omnipaque 350  90 cc administered   10 cc discarded  Oral Contrast: NONE  Complications: None reported at time of study completion    CT ANGIOGRAM ABDOMEN, PELVIS, AND LOWER EXTREMITIES:    PROCEDURE:  Initially, nonenhanced CT was obtained through the calves. Then,   following the rapid administration of intravenous contrast, CT   angiography was performed through the abdomen, pelvis, and lower   extremities down to the toes.  Delayed images through the calves were   also obtained. Sagittal and coronal reformats as well as 3D   reconstructions were performed.    FINDINGS:    CENTRAL ARTERIAL SYSTEM:    Abdominal aorta normal in caliber of mild to moderate calcified plaque.    Patent celiac artery with moderate narrowing at the origin. Patent   superior mesenteric artery with moderate to severe narrowing at the   origin. Moderate to severe plaque at the origin of both renal arteries.   Both renal arteries are small/attenuated. Inferior mesenteric artery is   patent.    Long segment occlusion of the right common iliac and external iliac   arteries with reconstitution just proximal to the common femoral artery.   Heavily calcified plaque at the origin of the right internal iliac artery   which is likely occluded with reconstitution distally.    Patent left common iliac artery with mild to moderate plaque. Patent left   external iliac artery with mild plaque. Left internal iliac artery is   patent with atherosclerotic changes.    RIGHT LOWER EXTREMITY:    Patent bifemoral bypass graft. The right common femoral artery. Extensive   atherosclerotic changes in the right superficial femoral artery with   multifocal high-grade stenoses or occlusions including proximally (series   4 image 358) and multiple foci distally including series 4 image 530).   Profunda femoral artery is patent with atherosclerotic changes    Multifocal severe narrowing of the popliteal artery.    Heavily calcified calf arteries which cannot be assessed secondary to the   extensive calcified plaque.    Multiple collaterals are noted in the thigh and calf.    LEFT LOWER EXTREMITY:    Patent common femoral artery. Extensive atherosclerotic changes in the   left superficial femoral artery with multifocal high-grade stenoses or   occlusions. Profunda femoral artery is patent with atherosclerotic   changes. Bypass graft from the left superficial femoral artery in the   thigh to tibioperoneal trunkwhich is patent. Severe multifocal   high-grade stenoses or occlusions of the popliteal artery.    High-grade stenosis of the tibioperoneal trunk distal to the takeoff of   the anterior tibial artery. Heavily calcified calf arteries limiting   evaluation. Peroneal artery appears patent at the ankle; posterior tibial   and anterior tibial arteries cannot be assessed secondary to the   calcified plaque.    Multiple collaterals are noted in the thigh and calf.    ADDITIONAL FINDINGS:    FINDINGS:  LOWER CHEST: Small bilateral pleural effusions with compressive   atelectasis in the lower lobes. Coronary artery calcifications.    LIVER: Calcified granuloma at the inferior aspect right hepatic lobe,   otherwise unremarkable.  BILE DUCTS: Normal caliber.  GALLBLADDER: Within normal limits.  SPLEEN: Within normal limits.  PANCREAS: Within normal limits.  ADRENALS: Within normal limits.  KIDNEYS/URETERS: 1.5 cm left adrenal nodule, not significantly changed.   Unremarkable right adrenal gland.    BLADDER: Unremarkable.  REPRODUCTIVE ORGANS: Uterus and adnexa are unremarkable.    BOWEL: Moderate amount retained fecal material in the colon, greatest in   the cecum. No evidence of a bowel obstruction. Appendix is normal.  PERITONEUM: No ascites.  RETROPERITONEUM/LYMPH NODES: No lymphadenopathy.  ABDOMINAL WALL: Unremarkable.  BONES: No aggressive osseous lesion. Degenerative changes in the spine.    IMPRESSION:    Patent bifemoral and left superficial femoral to tibioperoneal trunk   bypass grafts.    Long segment occlusion of the right common and external iliac arteries   with reconstitution at the right superficial femoral artery.    Severe atherosclerotic changes involving the superficial femoral and   popliteal arteries bilaterally with multifocal high-grade stenoses or   occlusions.    Heavily calcified calf arteries which cannot be assessed secondary to the   calcified plaque.    Given the degree of calcified plaque, an MRA may be able to better   evaluate the lower extremity arteries.    < end of copied text >       Medical Management:       Antiplatelets: DAPT       Cilostazol: N/A EF 45%       Statin: Atorvastatin 40 mg qHS    MEDICATIONS  (STANDING):  amLODIPine   Tablet 5 milliGRAM(s) Oral daily  aspirin enteric coated 81 milliGRAM(s) Oral daily  atorvastatin 40 milliGRAM(s) Oral at bedtime  calcium acetate 667 milliGRAM(s) Oral three times a day with meals  ceFAZolin   IVPB 1000 milliGRAM(s) IV Intermittent once  cefTRIAXone   IVPB 1000 milliGRAM(s) IV Intermittent every 24 hours  cloNIDine 0.1 milliGRAM(s) Oral two times a day  dextrose 5%. 1000 milliLiter(s) (100 mL/Hr) IV Continuous <Continuous>  dextrose 5%. 1000 milliLiter(s) (50 mL/Hr) IV Continuous <Continuous>  dextrose 50% Injectable 25 Gram(s) IV Push once  dextrose 50% Injectable 12.5 Gram(s) IV Push once  dextrose 50% Injectable 25 Gram(s) IV Push once  diltiazem    Tablet 60 milliGRAM(s) Oral every 8 hours  glucagon  Injectable 1 milliGRAM(s) IntraMuscular once  heparin   Injectable 5000 Unit(s) SubCutaneous every 12 hours  imipramine 50 milliGRAM(s) Oral daily  insulin glargine Injectable (LANTUS) 8 Unit(s) SubCutaneous at bedtime  insulin lispro (ADMELOG) corrective regimen sliding scale   SubCutaneous three times a day before meals  insulin lispro (ADMELOG) corrective regimen sliding scale   SubCutaneous at bedtime  metoprolol tartrate 100 milliGRAM(s) Oral every 12 hours  pantoprazole    Tablet 40 milliGRAM(s) Oral before breakfast  povidone iodine 10% Solution 1 Application(s) Topical daily  sodium chloride 0.9%. 1000 milliLiter(s) (50 mL/Hr) IV Continuous <Continuous>    MEDICATIONS  (PRN):  acetaminophen     Tablet .. 650 milliGRAM(s) Oral every 6 hours PRN Temp greater or equal to 38C (100.4F), Mild Pain (1 - 3)  aluminum hydroxide/magnesium hydroxide/simethicone Suspension 30 milliLiter(s) Oral every 4 hours PRN Dyspepsia  dextrose Oral Gel 15 Gram(s) Oral once PRN Blood Glucose LESS THAN 70 milliGRAM(s)/deciliter  diphenhydrAMINE Injectable 25 milliGRAM(s) IV Push <User Schedule> PRN Combative behavior  melatonin 3 milliGRAM(s) Oral at bedtime PRN Insomnia  ondansetron Injectable 4 milliGRAM(s) IV Push every 8 hours PRN Nausea and/or Vomiting    Vital Signs Last 24 Hrs  T(C): 36.3 (21 Jun 2022 04:34), Max: 36.8 (20 Jun 2022 19:55)  T(F): 97.3 (21 Jun 2022 04:34), Max: 98.2 (20 Jun 2022 19:55)  HR: 60 (21 Jun 2022 04:34) (53 - 76)  BP: 127/62 (21 Jun 2022 04:34) (101/60 - 138/81)  BP(mean): --  RR: 18 (21 Jun 2022 04:34) (18 - 20)  SpO2: 95% (21 Jun 2022 04:34) (93% - 100%)    Physical Exam:  General: NAD, resting comfortably in bed  Neuro: Oriented to self only, speech fluent, MUHAMMAD X 4=  Pulmonary: Nonlabored breathing, no respiratory distress, diminished at base R>L  Cardiovascular: Normal S1, S2 with soft syst murmur  Abdominal: soft, NT/ND  Extremities: R great toe distal tip dry gangrene without periwound erythema, no malodor, no crepitus. 2nd toe distal with dry gangrene. R groin incision well healed; L groin fem-BK pop incision well healed. R radiocephalic AVF + bruit, +thrill  Pulses:   Right:                                                                          Left:  FEM [ ]2+ [ ]1+ [ ]doppler                                           FEM [x ]2+ [ ]1+ [ ]doppler    POP [ ]2+ [ ]1+ [x ]doppler                                          POP [x ]2+ [ ]1+ [ ]doppler    DP [ ]2+ [ ]1+ [x ]doppler                                            DP [x ]2+ [ ]1+ [ ]doppler  PT[ ]2+ [ ]1+ [ x]doppler                                             PT [ x]2+ [ ]1+ [ ]doppler    LABS:                        11.4   9.64  )-----------( 274      ( 20 Jun 2022 07:04 )             37.1     06-20    137  |  96  |  32<H>  ----------------------------<  163<H>  4.9   |  32<H>  |  4.80<H>    Ca    9.1      20 Jun 2022 07:04    TPro  6.4  /  Alb  2.6<L>  /  TBili  0.4  /  DBili  x   /  AST  18  /  ALT  17  /  AlkPhos  101  06-20  PT/INR - ( 20 Jun 2022 12:36 )   PT: 11.4 sec;   INR: 0.97 ratio    PTT - ( 20 Jun 2022 12:36 )  PTT:29.8 sec    CAPILLARY BLOOD GLUCOSE  POCT Blood Glucose.: 133 mg/dL (21 Jun 2022 06:33)  POCT Blood Glucose.: 157 mg/dL (20 Jun 2022 21:08)  POCT Blood Glucose.: 101 mg/dL (20 Jun 2022 19:15)  POCT Blood Glucose.: 173 mg/dL (20 Jun 2022 16:35)  POCT Blood Glucose.: 148 mg/dL (20 Jun 2022 11:18)

## 2022-06-21 NOTE — PROGRESS NOTE ADULT - ASSESSMENT
72 yo woman with PMH of HTN, DM2, CAD, CHF, PAD, A.fib, multiple falls and ESRD on HD was admitted on 6/16, came to ED with generalized weakness.   Her hypoglycemia and hypothermia on admission could be related to sepsis/SIRS. Source likely sec to Right hallux with a dry gangrene and superimposed infection.   TOMMY/PVR's noted.     #PAD Chronic limb ischemia with multilevel occlusive PAD on right.  Preop for R fem-distal bypass today with Dr Miller    NPO after midnight confirmed  ABX continue  Podiatry input appreciated. Plan for R hallux amputation 6/23    #ESRD on HD  Had HD yesterday prior to OR today    #DM uncontrolled  Lantus and SS to resume post op    Medical and cardiac optimization and risk assessment prior to procedure appreciated    Discussed with Dr Miller

## 2022-06-21 NOTE — PROGRESS NOTE ADULT - SUBJECTIVE AND OBJECTIVE BOX
[facility]  INFECTIOUS DISEASES   [address] [place] [address]  Tel: [phone]     Fax: [phone]  ======================================================  MD Zita Alejandra Kaushal, MD Cho, Michelle, MD   ======================================================    [id number]  SHILO KOHLER     Follow up: R foot wound/gangrene     No new changes, no pain in foot, no fever.    Going to OR.     PAST MEDICAL & SURGICAL HISTORY:  Diabetes mellitus II  HTN (hypertension)  h/o Anxiety attack  Depression  h/o Myocardial infarct 2007  CAD (coronary artery disease)  h/o Hepatitis A 1969  currently resolved, no symptoms  PAD (peripheral artery disease)  Murmur, cardiac  h/o Smoking  quitted 3/2012  CRF (chronic renal failure), unspecified stage  Dialysis patient  Anemia secondary to renal failure  HTN (hypertension)  coronary stent 2007  s/p Ovarian cyst removal  s/p surgical removal of benign Skin lesion epigastric area    Social Hx: no current smoking, ETOH or drugs     FAMILY HISTORY:  No pertinent family history in first degree relatives    Allergies  latex (Unknown)  No Known Drug Allergies    Antibiotics:  zosyn     REVIEW OF SYSTEMS:  CONSTITUTIONAL:  No Fever or chills  HEENT:  No diplopia or blurred vision.  No sore throat or runny nose.  CARDIOVASCULAR:  No chest pain or SOB.  RESPIRATORY:  No cough, shortness of breath, PND or orthopnea.  GASTROINTESTINAL:  No nausea, vomiting or diarrhea.  GENITOURINARY:  No dysuria, frequency or urgency. No Blood in urine  MUSCULOSKELETAL:  no joint aches, no muscle pain  SKIN:  R foot wound   NEUROLOGIC:  No paresthesias, fasciculations, seizures or weakness.  PSYCHIATRIC:  No disorder of thought or mood.  ENDOCRINE:  No heat or cold intolerance, polyuria or polydipsia.  HEMATOLOGICAL:  No easy bruising or bleeding.     Physical Exam:  Vital Signs Last 24 Hrs  T(C): 36.7 (21 Jun 2022 10:35), Max: 36.8 (20 Jun 2022 19:55)  T(F): 98.1 (21 Jun 2022 10:35), Max: 98.2 (20 Jun 2022 19:55)  HR: 55 (21 Jun 2022 10:35) (55 - 76)  BP: 135/53 (21 Jun 2022 10:35) (126/81 - 138/81)  BP(mean): --  RR: 18 (21 Jun 2022 10:35) (18 - 19)  SpO2: 100% (21 Jun 2022 10:35) (93% - 100%)  GEN: NAD  HEENT: normocephalic and atraumatic. EOMI. PERRL.    NECK: Supple.  No lymphadenopathy   LUNGS: Clear to auscultation.  HEART: Irregular rate and rhythm   ABDOMEN: Soft, nontender, and nondistended.  Positive bowel sounds.    : No CVA tenderness  EXTREMITIES: R hallux with an ulcer, looks gangrenous with superimposed infection   has some bloody discharge. R heel ulcer very superficial,   left medial malleolus small ulcer minimal serous discharge   NEUROLOGIC: grossly intact.  PSYCHIATRIC: Appropriate affect .  SKIN: No rash     Labs:                        12.2   10.18 )-----------( 278      ( 21 Jun 2022 09:22 )             39.5     06-20    137  |  96  |  32<H>  ----------------------------<  163<H>  4.9   |  32<H>  |  4.80<H>    Ca    9.1      20 Jun 2022 07:04    TPro  6.4  /  Alb  2.6<L>  /  TBili  0.4  /  DBili  x   /  AST  18  /  ALT  17  /  AlkPhos  101  06-20    Culture - Urine (collected 06-16-22 @ 03:40)  Source: Clean Catch Clean Catch (Midstream)  Final Report (06-17-22 @ 07:32):    <10,000 CFU/mL Normal Urogenital Shantel    Culture - Blood (collected 06-16-22 @ 03:38)  Source: .Blood Blood-Peripheral  Final Report (06-21-22 @ 04:00):    No Growth Final    Culture - Blood (collected 06-16-22 @ 03:38)  Source: .Blood Blood-Peripheral  Final Report (06-21-22 @ 04:00):    No Growth Final    WBC Count: 10.18 K/uL (06-21-22 @ 09:22)  WBC Count: 9.64 K/uL (06-20-22 @ 07:04)  WBC Count: 9.99 K/uL (06-19-22 @ 07:18)  WBC Count: 10.73 K/uL (06-18-22 @ 07:54)  WBC Count: 12.79 K/uL (06-17-22 @ 07:52)    Creatinine, Serum: 4.80 mg/dL (06-20-22 @ 07:04)  Creatinine, Serum: 3.30 mg/dL (06-19-22 @ 07:18)  Creatinine, Serum: 4.90 mg/dL (06-18-22 @ 07:54)  Creatinine, Serum: 3.60 mg/dL (06-17-22 @ 07:52)    C-Reactive Protein, Serum: 19 mg/L (06-17-22 @ 10:38)    Sedimentation Rate, Erythrocyte: 13 mm/hr (06-17-22 @ 07:52)    COVID-19 PCR: NotDetec (06-20-22 @ 08:14)  SARS-CoV-2: NotDetec (06-15-22 @ 22:44)    All imaging and other data have been reviewed.  < from: CT Chest No Cont (06.16.22 @ 08:16) >  IMPRESSION:  Small layering bilateral pleural effusions decreased from 4/6/2022.  7 mm groundglass nodule within left lower lobe (series 2 image 44).   Recommend follow-up chest CT in 12 months to determine stability.    Assessment and Plan:   70 yo woman with PMH of HTN, DM2, CAD, CHF, A.fib, multiple falls and ESRD on HD was admitted on 6/16, came to ED with generalized weakness.   Her hypoglycemia and hypothermia on admission could be related to sepsis/SIRS source unclear at this time, could be foot infection? Left hallux looks like  a dry gangrene with superimposed infection.   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 She had Tissue cultures from 3/30/22 grew   klebsiella oxytoca/raoutella oxytoca, E. coli, MSSA so Cefazolin was given after HD to complete the course of treatment.   ESR 13 and CRP 19  MRSA PCR negative     Recommendations:  - Blood culture x 2 NGTD  - leukocytosis normalized  - Vascular surgery follow up noted, for revascularization today  - Podiatry follow up noted for possible amputation on 6/23 if patient agrees(refused in the past)  - Will continue ceftriaxone based on old culture    Will follow.    Geovany Long MD  Division of Infectious Diseases   Please call ID service at [phone] with any question.      35 minutes spent on total encounter assessing patient, examination, chart reivew, counseling and coordinating care by the attending physician/nurse/care manager.

## 2022-06-21 NOTE — PROGRESS NOTE ADULT - ASSESSMENT
Patient is a 71 year old female with PMH of A.fib rate controlled not on AC for hx of multiple falls, T2DM, HTN, HLD, ESRD on HD, CHF, s/p CABG brought in by EMS for generalized weakness at home. Patient states that she was doing well when in the afternoon she tried to get up from her couch and felt weak in the LE and fell back in her couch. Denies any hx of head trauma or loss of consciousness. Denies any weakness or numbness. Denies any chest pain, SOB or palpitations. No fever, cough or chills. No hx of recent travel or sick contacts. At the time of EMS arrival patient was found to have POCBS of 50. EMS gave dextrose and on arrival to the ED patient blood sugar was found to be in 30's with hypothermia of 94.9.    ED course:   Vitals:   BP: 176/85  HR:76  Temp:94.2  RR:18, O2:96% on RA   Significant Labs:   CBC: WBC:16.82 Hb:13.2 , Platlets: 260, Neutro:89   CMP: Lytes: WNL, BUN/Creatinine:48/4.8, Glucose:134 , Alkaline Phos:128, AST, 41, eGFR: 9, HsTrop: 275.9, ProBNP: [id number], Lactate: 2  UA; negative  CXR: Heart and lung appear normal (self read)  CT BRAIN: No acute intracranial bleeding. Central volume loss, chronic microvascular ischemic changes, and chronic bilateral lacunar infarctions.  CT CERVICAL SPINE: No fracture. Grade 1 C4-C5 anterolisthesis. C6-C7 disc degeneration. Bilateral pleural effusions and interlobular septal thickening due to   interstitial edema.  EKG: NSR, left axis deviation, HR 71,   QT/QTc: 426/462  Patient received: Ceftriaxone 1 g x1, Dextrose 5% + NS 1L x1, Dextrose 50% IV X1, heating blanket, 2L NC   (16 Jun 2022 01:19)      esrd on hd plan in am       ANEMIA PLAN:  Anemia of chronic disease:  We will continue epo  aiming for a HCT of 32-36 %.   We will monitor Iron stores, B12 and RBC folate .    BP monitoring,continue current antihypertensive meds, low salt diet    f/u  blood and urine cx,serial lactate levels,monitor vitals closley,ivfs hydration,monitor urine output and renal profile,iv abx

## 2022-06-21 NOTE — PROGRESS NOTE ADULT - SUBJECTIVE AND OBJECTIVE BOX
Patient is a 71y Female with a known history of :  SIRS (systemic inflammatory response syndrome) [R65.10]    Hypoglycemia [E16.2]    Pleural effusion [J90]    CHF, acute [I50.9]    Chronic CHF [I50.9]    Elevated troponin [R77.8]    Atrial fibrillation [I48.91]    Benign essential HTN [I10]    HLD (hyperlipidemia) [E78.5]    Depression, major [F32.9]    Need for prophylactic measure [Z29.9]    ESRD on hemodialysis [N18.6]    T2DM (type 2 diabetes mellitus) [E11.9]    HFrEF (heart failure with reduced ejection fraction) [I50.20]    Gangrene of toe of right foot [I96]    Atherosclerotic PVD with ulceration [I70.209]      HPI:  Patient is a 71 year old female with PMH of A.fib rate controlled not on AC for hx of multiple falls, T2DM, HTN, HLD, ESRD on HD, CHF, s/p CABG brought in by EMS for generalized weakness at home. Patient states that she was doing well when in the afternoon she tried to get up from her couch and felt weak in the LE and fell back in her couch. Denies any hx of head trauma or loss of consciousness. Denies any weakness or numbness. Denies any chest pain, SOB or palpitations. No fever, cough or chills. No hx of recent travel or sick contacts. At the time of EMS arrival patient was found to have POCBS of 50. EMS gave dextrose and on arrival to the ED patient blood sugar was found to be in 30's with hypothermia of 94.9.    ED course:   Vitals:   BP: 176/85  HR:76  Temp:94.2  RR:18, O2:96% on RA   Significant Labs:   CBC: WBC:16.82 Hb:13.2 , Platlets: 260, Neutro:89   CMP: Lytes: WNL, BUN/Creatinine:48/4.8, Glucose:134 , Alkaline Phos:128, AST, 41, eGFR: 9, HsTrop: 275.9, ProBNP: [id number], Lactate: 2  UA; negative  CXR: Heart and lung appear normal (self read)  CT BRAIN: No acute intracranial bleeding. Central volume loss, chronic microvascular ischemic changes, and chronic bilateral lacunar infarctions.  CT CERVICAL SPINE: No fracture. Grade 1 C4-C5 anterolisthesis. C6-C7 disc degeneration. Bilateral pleural effusions and interlobular septal thickening due to   interstitial edema.  EKG: NSR, left axis deviation, HR 71,   QT/QTc: 426/462  Patient received: Ceftriaxone 1 g x1, Dextrose 5% + NS 1L x1, Dextrose 50% IV X1, heating blanket, 2L NC   (16 Jun 2022 01:19)      REVIEW OF SYSTEMS:    CONSTITUTIONAL: No fever, weight loss, or fatigue  EYES: No eye pain, visual disturbances, or discharge  ENMT:  No difficulty hearing, tinnitus, vertigo; No sinus or throat pain  NECK: No pain or stiffness  BREASTS: No pain, masses, or nipple discharge  RESPIRATORY: No cough, wheezing, chills or hemoptysis; No shortness of breath  CARDIOVASCULAR: No chest pain, palpitations, dizziness, or leg swelling  GASTROINTESTINAL: No abdominal or epigastric pain. No nausea, vomiting, or hematemesis; No diarrhea or constipation. No melena or hematochezia.  GENITOURINARY: No dysuria, frequency, hematuria, or incontinence  NEUROLOGICAL: No headaches, memory loss, loss of strength, numbness, or tremors  SKIN: No itching, burning, rashes, or lesions   LYMPH NODES: No enlarged glands  ENDOCRINE: No heat or cold intolerance; No hair loss  MUSCULOSKELETAL: No joint pain or swelling; No muscle, back, or extremity pain  PSYCHIATRIC: No depression, anxiety, mood swings, or difficulty sleeping  HEME/LYMPH: No easy bruising, or bleeding gums  ALLERGY AND IMMUNOLOGIC: No hives or eczema    MEDICATIONS  (STANDING):  amLODIPine   Tablet 5 milliGRAM(s) Oral daily  aspirin enteric coated 81 milliGRAM(s) Oral daily  atorvastatin 40 milliGRAM(s) Oral at bedtime  calcium acetate 667 milliGRAM(s) Oral three times a day with meals  ceFAZolin   IVPB 1000 milliGRAM(s) IV Intermittent once  cefTRIAXone   IVPB 1000 milliGRAM(s) IV Intermittent every 24 hours  cloNIDine 0.1 milliGRAM(s) Oral two times a day  dextrose 5%. 1000 milliLiter(s) (100 mL/Hr) IV Continuous <Continuous>  dextrose 5%. 1000 milliLiter(s) (50 mL/Hr) IV Continuous <Continuous>  dextrose 50% Injectable 25 Gram(s) IV Push once  dextrose 50% Injectable 12.5 Gram(s) IV Push once  dextrose 50% Injectable 25 Gram(s) IV Push once  diltiazem    Tablet 60 milliGRAM(s) Oral every 8 hours  glucagon  Injectable 1 milliGRAM(s) IntraMuscular once  heparin   Injectable 5000 Unit(s) SubCutaneous every 12 hours  imipramine 50 milliGRAM(s) Oral daily  insulin glargine Injectable (LANTUS) 8 Unit(s) SubCutaneous at bedtime  insulin lispro (ADMELOG) corrective regimen sliding scale   SubCutaneous three times a day before meals  insulin lispro (ADMELOG) corrective regimen sliding scale   SubCutaneous at bedtime  metoprolol tartrate 100 milliGRAM(s) Oral every 12 hours  pantoprazole    Tablet 40 milliGRAM(s) Oral before breakfast  povidone iodine 10% Solution 1 Application(s) Topical daily  sodium chloride 0.9%. 1000 milliLiter(s) (50 mL/Hr) IV Continuous <Continuous>    MEDICATIONS  (PRN):  acetaminophen     Tablet .. 650 milliGRAM(s) Oral every 6 hours PRN Temp greater or equal to 38C (100.4F), Mild Pain (1 - 3)  aluminum hydroxide/magnesium hydroxide/simethicone Suspension 30 milliLiter(s) Oral every 4 hours PRN Dyspepsia  dextrose Oral Gel 15 Gram(s) Oral once PRN Blood Glucose LESS THAN 70 milliGRAM(s)/deciliter  diphenhydrAMINE Injectable 25 milliGRAM(s) IV Push <User Schedule> PRN Combative behavior  melatonin 3 milliGRAM(s) Oral at bedtime PRN Insomnia  ondansetron Injectable 4 milliGRAM(s) IV Push every 8 hours PRN Nausea and/or Vomiting      ALLERGIES: latex (Unknown)  No Known Drug Allergies      FAMILY HISTORY:  No pertinent family history in first degree relatives        PHYSICAL EXAMINATION:  -----------------------------  T(C): 36.3 (06-21-22 @ 04:34), Max: 36.8 (06-20-22 @ 19:55)  HR: 60 (06-21-22 @ 04:34) (53 - 76)  BP: 127/62 (06-21-22 @ 04:34) (101/60 - 138/81)  RR: 18 (06-21-22 @ 04:34) (18 - 20)  SpO2: 95% (06-21-22 @ 04:34) (93% - 100%)  Wt(kg): --    06-20 @ 07:01  -  06-21 @ 07:00  --------------------------------------------------------  IN:    IV PiggyBack: 50 mL  Total IN: 50 mL    OUT:    Other (mL): 2500 mL  Total OUT: 2500 mL    Total NET: -2450 mL            VITALS  T(C): 36.3 (06-21-22 @ 04:34), Max: 36.8 (06-20-22 @ 19:55)  HR: 60 (06-21-22 @ 04:34) (53 - 76)  BP: 127/62 (06-21-22 @ 04:34) (101/60 - 138/81)  RR: 18 (06-21-22 @ 04:34) (18 - 20)  SpO2: 95% (06-21-22 @ 04:34) (93% - 100%)    Constitutional: well developed, normal appearance, well groomed, well nourished, no deformities and no acute distress.   Eyes: the conjunctiva exhibited no abnormalities and the eyelids demonstrated no xanthelasmas.   HEENT: normal oral mucosa, no oral pallor and no oral cyanosis.   Neck: normal jugular venous A waves present, normal jugular venous V waves present and no jugular venous wilson A waves.   Pulmonary: no respiratory distress, normal respiratory rhythm and effort, no accessory muscle use and lungs were clear to auscultation bilaterally.   Cardiovascular: heart rate and rhythm were normal, normal S1 and S2 and no murmur, gallop, rub, heave or thrill are present.   Abdomen: soft, non-tender, no hepato-splenomegaly and no abdominal mass palpated.   Musculoskeletal: the gait could not be assessed..   Extremities: no clubbing of the fingernails, no localized cyanosis, no petechial hemorrhages and no ischemic changes.   Skin: normal skin color and pigmentation, no rash, no venous stasis, no skin lesions, no skin ulcer and no xanthoma was observed.   Psychiatric: oriented to person, place, and time, the affect was normal, the mood was normal and not feeling anxious.     LABS:   --------  06-20    137  |  96  |  32<H>  ----------------------------<  163<H>  4.9   |  32<H>  |  4.80<H>    Ca    9.1      20 Jun 2022 07:04    TPro  6.4  /  Alb  2.6<L>  /  TBili  0.4  /  DBili  x   /  AST  18  /  ALT  17  /  AlkPhos  101  06-20                         11.4   9.64  )-----------( 274      ( 20 Jun 2022 07:04 )             37.1     PT/INR - ( 20 Jun 2022 12:36 )   PT: 11.4 sec;   INR: 0.97 ratio         PTT - ( 20 Jun 2022 12:36 )  PTT:29.8 sec            RADIOLOGY:  -----------------    ECG:     ECHO:

## 2022-06-21 NOTE — CONSULT NOTE ADULT - SUBJECTIVE AND OBJECTIVE BOX
Patient is a 71y old  Female who presents with a chief complaint of SIRS (21 Jun 2022 15:58)    BRIEF HOSPITAL COURSE:   HPI:  Patient is a 71 year old female with PMH of A.fib rate controlled not on AC for hx of multiple falls, T2DM, HTN, HLD, ESRD on HD, CHF, s/p CABG brought in by EMS for generalized weakness at home. Patient states that she was doing well when in the afternoon she tried to get up from her couch and felt weak in the LE and fell back in her couch. Denies any hx of head trauma or loss of consciousness. Denies any weakness or numbness. Denies any chest pain, SOB or palpitations. No fever, cough or chills. No hx of recent travel or sick contacts. At the time of EMS arrival patient was found to have POCBS of 50. EMS gave dextrose and on arrival to the ED patient blood sugar was found to be in 30's with hypothermia of 94.9.    ED course:   Vitals:   BP: 176/85  HR:76  Temp:94.2  RR:18, O2:96% on RA   Significant Labs:   CBC: WBC:16.82 Hb:13.2 , Platlets: 260, Neutro:89   CMP: Lytes: WNL, BUN/Creatinine:48/4.8, Glucose:134 , Alkaline Phos:128, AST, 41, eGFR: 9, HsTrop: 275.9, ProBNP: 185929, Lactate: 2  UA; negative  CXR: Heart and lung appear normal (self read)  CT BRAIN: No acute intracranial bleeding. Central volume loss, chronic microvascular ischemic changes, and chronic bilateral lacunar infarctions.  CT CERVICAL SPINE: No fracture. Grade 1 C4-C5 anterolisthesis. C6-C7 disc degeneration. Bilateral pleural effusions and interlobular septal thickening due to   interstitial edema.  EKG: NSR, left axis deviation, HR 71,   QT/QTc: 426/462  Patient received: Ceftriaxone 1 g x1, Dextrose 5% + NS 1L x1, Dextrose 50% IV X1, heating blanket, 2L NC   (16 Jun 2022 01:19)    Events last 24 hours:   - ICU consult for q1h NV checks s/p R fem pop bypass     Review of Systems:  R leg pain    PAST MEDICAL & SURGICAL HISTORY:  Diabetes mellitus II  HTN (hypertension)  h/o Anxiety attack  Depression  h/o Myocardial infarct 2007  CAD (coronary artery disease)  h/o Hepatitis A 1969  currently resolved, no symptoms  PAD (peripheral artery disease)  Murmur, cardiac  h/o Smoking  quitted 3/2012  CRF (chronic renal failure), unspecified stage  Dialysis patient  Anemia secondary to renal failure  HTN (hypertension)  coronary stent 2007  s/p Ovarian cyst removal  s/p surgical removal of benign Skin lesion epigastric area    Medications:  HYDROmorphone  Injectable 0.5 milliGRAM(s) IV Push every 10 minutes PRN  ondansetron Injectable 4 milliGRAM(s) IV Push once PRN  lactated ringers. 1000 milliLiter(s) IV Continuous <Continuous>  chlorhexidine 4% Liquid 1 Application(s) Topical <User Schedule>    ICU Vital Signs Last 24 Hrs  T(C): 36.3 (21 Jun 2022 18:30), Max: 36.7 (21 Jun 2022 10:35)  T(F): 97.3 (21 Jun 2022 18:30), Max: 98.1 (21 Jun 2022 10:35)  HR: 63 (21 Jun 2022 19:15) (55 - 63)  BP: 132/51 (21 Jun 2022 19:15) (127/62 - 144/56)  RR: 13 (21 Jun 2022 19:15) (12 - 18)  SpO2: 100% (21 Jun 2022 19:15) (95% - 100%)    I&O's Detail    20 Jun 2022 07:01  -  21 Jun 2022 07:00  --------------------------------------------------------  IN:    IV PiggyBack: 50 mL  Total IN: 50 mL    OUT:    Other (mL): 2500 mL  Total OUT: 2500 mL    Total NET: -2450 mL    LABS:                        12.2   10.18 )-----------( 278      ( 21 Jun 2022 09:22 )             39.5     06-20    137  |  96  |  32<H>  ----------------------------<  163<H>  4.9   |  32<H>  |  4.80<H>    Ca    9.1      20 Jun 2022 07:04    TPro  6.4  /  Alb  2.6<L>  /  TBili  0.4  /  DBili  x   /  AST  18  /  ALT  17  /  AlkPhos  101  06-20    POCT Blood Glucose.: 142 mg/dL (21 Jun 2022 18:00)    PT/INR - ( 20 Jun 2022 12:36 )   PT: 11.4 sec;   INR: 0.97 ratio    PTT - ( 20 Jun 2022 12:36 )  PTT:29.8 sec    CULTURES:  Culture Results:   <10,000 CFU/mL Normal Urogenital Shantel (06-16 @ 03:40)  Culture Results:   No Growth Final (06-16 @ 03:38)  Culture Results:   No Growth Final (06-16 @ 03:38)  Rapid RVP Result: NotDetec (06-15 @ 22:44)    Physical Examination:  General: No acute distress.    HEENT: Pupils equal, reactive to light.  Symmetric.  PULM: Clear to auscultation bilaterally, no significant sputum production  NECK: Supple, no lymphadenopathy, trachea midline  CVS: Regular rate and rhythm, no murmurs, rubs, or gallops  ABD: Soft, nondistended, nontender, normoactive bowel sounds, no masses  EXT: Dressing clean/dry/intact   SKIN: Warm and well perfused  NEURO: Alert, oriented, interactive, nonfocal  RADIOLOGY:   < from: CT Chest No Cont (06.16.22 @ 08:16) >    ACC: [id number] EXAM:  CT CHEST                          PROCEDURE DATE:  06/16/2022      INTERPRETATION:  INDICATION: Pleural effusion  TECHNIQUE: Unenhanced CT of the chest. Coronal, sagittal, and MIP images   were reconstructed and reviewed.  COMPARISON: 4/6/2022 chest CT.    FINDINGS:    AIRWAYS, LUNGS and PLEURA: Patent central airways. Small layering   bilateral pleural effusions decreased from 4/6/2022. Mild bibasilar   atelectasis. 7 mm groundglass nodule within superior segment of left   lower lobe (series 2 image 44). The lungs are otherwise clear. No pleural   effusion.    MEDIASTINUM AND ZOHAIB: No lymphadenopathy.    HEART AND VESSELS: Cardiomegaly. Coronary stents/calcifications. No   pericardial effusion. Thoracic aorta and pulmonary artery normal in   diameter.    VISUALIZED UPPER ABDOMEN: Extensive arterial calcifications.    CHEST WALL AND BONES: No aggressive osseous lesion.    LOWER NECK: Within normal limits.    IMPRESSION:    Small layering bilateral pleural effusions decreased from 4/6/2022.    7 mm groundglass nodule within left lower lobe (series 2 image 44).   Recommend follow-up chest CT in 12 months to determine stability.    --- End of Report ---    MEREDITH SHRESTHA MD; Attending Radiologist  This document has been electronically signed. Jun 16 2022  8:29AM    < end of copied text >

## 2022-06-22 ENCOUNTER — TRANSCRIPTION ENCOUNTER (OUTPATIENT)
Age: 72
End: 2022-06-22

## 2022-06-22 LAB
ALBUMIN SERPL ELPH-MCNC: 2.3 G/DL — LOW (ref 3.3–5)
ALP SERPL-CCNC: 88 U/L — SIGNIFICANT CHANGE UP (ref 40–120)
ALT FLD-CCNC: 12 U/L — SIGNIFICANT CHANGE UP (ref 12–78)
ANION GAP SERPL CALC-SCNC: 12 MMOL/L — SIGNIFICANT CHANGE UP (ref 5–17)
AST SERPL-CCNC: 14 U/L — LOW (ref 15–37)
BASOPHILS # BLD AUTO: 0.13 K/UL — SIGNIFICANT CHANGE UP (ref 0–0.2)
BASOPHILS NFR BLD AUTO: 1 % — SIGNIFICANT CHANGE UP (ref 0–2)
BILIRUB SERPL-MCNC: 0.3 MG/DL — SIGNIFICANT CHANGE UP (ref 0.2–1.2)
BUN SERPL-MCNC: 34 MG/DL — HIGH (ref 7–23)
CALCIUM SERPL-MCNC: 8 MG/DL — LOW (ref 8.5–10.1)
CHLORIDE SERPL-SCNC: 99 MMOL/L — SIGNIFICANT CHANGE UP (ref 96–108)
CO2 SERPL-SCNC: 25 MMOL/L — SIGNIFICANT CHANGE UP (ref 22–31)
CREAT SERPL-MCNC: 4.3 MG/DL — HIGH (ref 0.5–1.3)
EGFR: 10 ML/MIN/1.73M2 — LOW
EOSINOPHIL # BLD AUTO: 0 K/UL — SIGNIFICANT CHANGE UP (ref 0–0.5)
EOSINOPHIL NFR BLD AUTO: 0 % — SIGNIFICANT CHANGE UP (ref 0–6)
GLUCOSE SERPL-MCNC: 208 MG/DL — HIGH (ref 70–99)
HCT VFR BLD CALC: 35.3 % — SIGNIFICANT CHANGE UP (ref 34.5–45)
HGB BLD-MCNC: 10.8 G/DL — LOW (ref 11.5–15.5)
LYMPHOCYTES # BLD AUTO: 0.4 K/UL — LOW (ref 1–3.3)
LYMPHOCYTES # BLD AUTO: 3 % — LOW (ref 13–44)
MAGNESIUM SERPL-MCNC: 2.2 MG/DL — SIGNIFICANT CHANGE UP (ref 1.6–2.6)
MCHC RBC-ENTMCNC: 28.5 PG — SIGNIFICANT CHANGE UP (ref 27–34)
MCHC RBC-ENTMCNC: 30.6 GM/DL — LOW (ref 32–36)
MCV RBC AUTO: 93.1 FL — SIGNIFICANT CHANGE UP (ref 80–100)
MONOCYTES # BLD AUTO: 0.13 K/UL — SIGNIFICANT CHANGE UP (ref 0–0.9)
MONOCYTES NFR BLD AUTO: 1 % — LOW (ref 2–14)
NEUTROPHILS # BLD AUTO: 12.59 K/UL — HIGH (ref 1.8–7.4)
NEUTROPHILS NFR BLD AUTO: 94 % — HIGH (ref 43–77)
NRBC # BLD: SIGNIFICANT CHANGE UP /100 WBCS (ref 0–0)
PHOSPHATE SERPL-MCNC: 5.7 MG/DL — HIGH (ref 2.5–4.5)
PLATELET # BLD AUTO: 339 K/UL — SIGNIFICANT CHANGE UP (ref 150–400)
POTASSIUM SERPL-MCNC: 5 MMOL/L — SIGNIFICANT CHANGE UP (ref 3.5–5.3)
POTASSIUM SERPL-SCNC: 5 MMOL/L — SIGNIFICANT CHANGE UP (ref 3.5–5.3)
PROT SERPL-MCNC: 5.7 G/DL — LOW (ref 6–8.3)
RBC # BLD: 3.79 M/UL — LOW (ref 3.8–5.2)
RBC # FLD: 17.1 % — HIGH (ref 10.3–14.5)
SODIUM SERPL-SCNC: 136 MMOL/L — SIGNIFICANT CHANGE UP (ref 135–145)
WBC # BLD: 13.25 K/UL — HIGH (ref 3.8–10.5)
WBC # FLD AUTO: 13.25 K/UL — HIGH (ref 3.8–10.5)

## 2022-06-22 PROCEDURE — 99024 POSTOP FOLLOW-UP VISIT: CPT

## 2022-06-22 PROCEDURE — 99221 1ST HOSP IP/OBS SF/LOW 40: CPT

## 2022-06-22 PROCEDURE — 99233 SBSQ HOSP IP/OBS HIGH 50: CPT

## 2022-06-22 PROCEDURE — 99223 1ST HOSP IP/OBS HIGH 75: CPT | Mod: GC

## 2022-06-22 RX ADMIN — Medication 60 MILLIGRAM(S): at 05:48

## 2022-06-22 RX ADMIN — Medication 2: at 08:02

## 2022-06-22 RX ADMIN — CLOPIDOGREL BISULFATE 75 MILLIGRAM(S): 75 TABLET, FILM COATED ORAL at 12:55

## 2022-06-22 RX ADMIN — ATORVASTATIN CALCIUM 40 MILLIGRAM(S): 80 TABLET, FILM COATED ORAL at 21:41

## 2022-06-22 RX ADMIN — Medication 1000 MILLIGRAM(S): at 00:19

## 2022-06-22 RX ADMIN — Medication 5: at 17:30

## 2022-06-22 RX ADMIN — Medication 81 MILLIGRAM(S): at 12:35

## 2022-06-22 RX ADMIN — HEPARIN SODIUM 5000 UNIT(S): 5000 INJECTION INTRAVENOUS; SUBCUTANEOUS at 21:48

## 2022-06-22 RX ADMIN — Medication 1 APPLICATION(S): at 12:56

## 2022-06-22 RX ADMIN — Medication 1000 MILLIGRAM(S): at 17:53

## 2022-06-22 RX ADMIN — Medication 667 MILLIGRAM(S): at 09:18

## 2022-06-22 RX ADMIN — Medication 1000 MILLIGRAM(S): at 17:31

## 2022-06-22 RX ADMIN — Medication 50 MILLIGRAM(S): at 12:35

## 2022-06-22 RX ADMIN — Medication 100 MILLIGRAM(S): at 05:49

## 2022-06-22 RX ADMIN — Medication 667 MILLIGRAM(S): at 17:30

## 2022-06-22 RX ADMIN — Medication 0.1 MILLIGRAM(S): at 17:30

## 2022-06-22 RX ADMIN — Medication 4: at 21:38

## 2022-06-22 RX ADMIN — CEFTRIAXONE 100 MILLIGRAM(S): 500 INJECTION, POWDER, FOR SOLUTION INTRAMUSCULAR; INTRAVENOUS at 14:57

## 2022-06-22 RX ADMIN — HYDROMORPHONE HYDROCHLORIDE 0.5 MILLIGRAM(S): 2 INJECTION INTRAMUSCULAR; INTRAVENOUS; SUBCUTANEOUS at 16:19

## 2022-06-22 RX ADMIN — HYDROMORPHONE HYDROCHLORIDE 0.5 MILLIGRAM(S): 2 INJECTION INTRAMUSCULAR; INTRAVENOUS; SUBCUTANEOUS at 16:45

## 2022-06-22 RX ADMIN — Medication 667 MILLIGRAM(S): at 12:35

## 2022-06-22 RX ADMIN — Medication 1000 MILLIGRAM(S): at 06:19

## 2022-06-22 RX ADMIN — AMLODIPINE BESYLATE 5 MILLIGRAM(S): 2.5 TABLET ORAL at 05:49

## 2022-06-22 RX ADMIN — Medication 1000 MILLIGRAM(S): at 12:34

## 2022-06-22 RX ADMIN — Medication 1000 MILLIGRAM(S): at 05:49

## 2022-06-22 RX ADMIN — INSULIN GLARGINE 8 UNIT(S): 100 INJECTION, SOLUTION SUBCUTANEOUS at 21:39

## 2022-06-22 RX ADMIN — Medication 60 MILLIGRAM(S): at 21:40

## 2022-06-22 RX ADMIN — PANTOPRAZOLE SODIUM 40 MILLIGRAM(S): 20 TABLET, DELAYED RELEASE ORAL at 06:08

## 2022-06-22 RX ADMIN — Medication 0.1 MILLIGRAM(S): at 05:49

## 2022-06-22 RX ADMIN — HEPARIN SODIUM 5000 UNIT(S): 5000 INJECTION INTRAVENOUS; SUBCUTANEOUS at 12:35

## 2022-06-22 RX ADMIN — Medication 1000 MILLIGRAM(S): at 12:55

## 2022-06-22 NOTE — CONSULT NOTE ADULT - ASSESSMENT
Physical Exam:   Vital Signs Last 24 Hrs  T(C): 36.4 (22 Jun 2022 13:35), Max: 36.4 (21 Jun 2022 17:45)  T(F): 97.5 (22 Jun 2022 13:35), Max: 97.5 (21 Jun 2022 17:45)  HR: 61 (22 Jun 2022 14:00) (57 - 68)  BP: 130/64 (22 Jun 2022 14:00) (99/50 - 174/70)  BP(mean): 92 (22 Jun 2022 14:00) (71 - 101)  RR: 25 (22 Jun 2022 14:00) (11 - 25)  SpO2: 100% (22 Jun 2022 14:00) (92% - 100%)    General: NAD, denies Fever, chills  CVS: S1S2 no M/R/G  Resp: CTA in all fields           CAPILLARY BLOOD GLUCOSE      POCT Blood Glucose.: 154 mg/dL (22 Jun 2022 11:27)  POCT Blood Glucose.: 207 mg/dL (22 Jun 2022 07:59)  POCT Blood Glucose.: 133 mg/dL (21 Jun 2022 22:07)  POCT Blood Glucose.: 142 mg/dL (21 Jun 2022 18:00)      Cholesterol, Serum: 113 mg/dL (05.19.21 @ 08:36)     HDL Cholesterol, Serum: 22 mg/dL (05.19.21 @ 08:36)     LDL Cholesterol Calculated: 66 mg/dL (05.19.21 @ 08:36)     DIET: CC  >50%

## 2022-06-22 NOTE — PROGRESS NOTE ADULT - SUBJECTIVE AND OBJECTIVE BOX
Patient is a 71y Female whom presented to the hospital with esrd on hd     PAST MEDICAL & SURGICAL HISTORY:  Diabetes mellitus II      HTN (hypertension)      h/o Anxiety attack      Depression      h/o Myocardial infarct 2007      CAD (coronary artery disease)      h/o Hepatitis A 1969  currently resolved, no symptoms      PAD (peripheral artery disease)      Murmur, cardiac      h/o Smoking  quitted 3/2012      CRF (chronic renal failure), unspecified stage      Dialysis patient      Anemia secondary to renal failure      HTN (hypertension)      coronary stent 2007      s/p Ovarian cyst removal      s/p surgical removal of benign Skin lesion epigastric area          MEDICATIONS  (STANDING):  amLODIPine   Tablet 5 milliGRAM(s) Oral daily  aspirin enteric coated 81 milliGRAM(s) Oral daily  atorvastatin 40 milliGRAM(s) Oral at bedtime  calcium acetate 667 milliGRAM(s) Oral three times a day with meals  cefTRIAXone   IVPB 1000 milliGRAM(s) IV Intermittent every 24 hours  cloNIDine 0.1 milliGRAM(s) Oral two times a day  clopidogrel Tablet 75 milliGRAM(s) Oral daily  dextrose 5%. 1000 milliLiter(s) (100 mL/Hr) IV Continuous <Continuous>  dextrose 5%. 1000 milliLiter(s) (50 mL/Hr) IV Continuous <Continuous>  dextrose 50% Injectable 25 Gram(s) IV Push once  dextrose 50% Injectable 12.5 Gram(s) IV Push once  dextrose 50% Injectable 25 Gram(s) IV Push once  diltiazem    Tablet 60 milliGRAM(s) Oral every 8 hours  glucagon  Injectable 1 milliGRAM(s) IntraMuscular once  heparin   Injectable 5000 Unit(s) SubCutaneous every 12 hours  imipramine 50 milliGRAM(s) Oral daily  insulin glargine Injectable (LANTUS) 12 Unit(s) SubCutaneous at bedtime  insulin lispro (ADMELOG) corrective regimen sliding scale   SubCutaneous three times a day before meals  insulin lispro (ADMELOG) corrective regimen sliding scale   SubCutaneous at bedtime  metoprolol tartrate 100 milliGRAM(s) Oral every 12 hours  pantoprazole    Tablet 40 milliGRAM(s) Oral before breakfast  povidone iodine 10% Solution 1 Application(s) Topical daily      Allergies    latex (Unknown)  No Known Drug Allergies    Intolerances        SOCIAL HISTORY:  Denies ETOh,Smoking,     FAMILY HISTORY:  No pertinent family history in first degree relatives        REVIEW OF SYSTEMS:    CONSTITUTIONAL: No weakness, fevers or chills  RESPIRATORY: No cough, wheezing, hemoptysis; No shortness of breath  CARDIOVASCULAR: No chest pain or palpitations  GASTROINTESTINAL: No abdominal or epigastric pain. No nausea, vomiting,     No diarrhea or constipation. No melena   SKIN: dry              MEDICATIONS  (STANDING):  acetaminophen     Tablet .. 1000 milliGRAM(s) Oral every 6 hours  amLODIPine   Tablet 5 milliGRAM(s) Oral daily  aspirin enteric coated 81 milliGRAM(s) Oral daily  atorvastatin 40 milliGRAM(s) Oral at bedtime  calcium acetate 667 milliGRAM(s) Oral three times a day with meals  cefTRIAXone   IVPB 1000 milliGRAM(s) IV Intermittent every 24 hours  chlorhexidine 4% Liquid 1 Application(s) Topical <User Schedule>  cloNIDine 0.1 milliGRAM(s) Oral two times a day  clopidogrel Tablet 75 milliGRAM(s) Oral daily  dextrose 5%. 1000 milliLiter(s) (50 mL/Hr) IV Continuous <Continuous>  dextrose 5%. 1000 milliLiter(s) (100 mL/Hr) IV Continuous <Continuous>  dextrose 50% Injectable 25 Gram(s) IV Push once  dextrose 50% Injectable 12.5 Gram(s) IV Push once  dextrose 50% Injectable 25 Gram(s) IV Push once  diltiazem    Tablet 60 milliGRAM(s) Oral every 8 hours  glucagon  Injectable 1 milliGRAM(s) IntraMuscular once  heparin   Injectable 5000 Unit(s) SubCutaneous every 12 hours  imipramine 50 milliGRAM(s) Oral daily  insulin glargine Injectable (LANTUS) 8 Unit(s) SubCutaneous at bedtime  insulin lispro (ADMELOG) corrective regimen sliding scale   SubCutaneous three times a day before meals  insulin lispro (ADMELOG) corrective regimen sliding scale   SubCutaneous at bedtime  metoprolol tartrate 100 milliGRAM(s) Oral every 12 hours  pantoprazole    Tablet 40 milliGRAM(s) Oral before breakfast  povidone iodine 10% Solution 1 Application(s) Topical daily  sodium chloride 0.9%. 1000 milliLiter(s) (50 mL/Hr) IV Continuous <Continuous>                                              PHYSICAL EXAM:    Constitutional: NAD  HEENT: conjunctive   clear   Neck:  No JVD  Respiratory: CTAB  Cardiovascular: S1 and S2  Gastrointestinal: BS+, soft, NT/ND  Extremities: No peripheral edema  , pos toe bandage   : No Renteria  Skin: No rashes

## 2022-06-22 NOTE — CONSULT NOTE ADULT - PROVIDER SPECIALTY LIST ADULT
Nephrology
Endocrinology
Vascular Surgery
Critical Care
Cardiology
Infectious Disease
Diabetes
Wound Care
Podiatry

## 2022-06-22 NOTE — PROGRESS NOTE ADULT - PROBLEM SELECTOR PLAN 1
cont lantus 8 units qhs  cont admelog corrective scale coverage qac/qhs  cont cons cho diet  goal bg 140-180 in hosp setting.

## 2022-06-22 NOTE — DIETITIAN NUTRITION RISK NOTIFICATION - TREATMENT: THE FOLLOWING DIET HAS BEEN RECOMMENDED
Diet, Renal Restrictions:   For patients receiving Renal Replacement - No Protein Restr, No Conc K, No Conc Phos, Low Sodium  Consistent Carbohydrate {No Snacks}  DASH/TLC {Sodium & Cholesterol Restricted} (06-22-22 @ 09:33) [Active]  Diet, NPO after Midnight:      NPO Start Date: 22-Jun-2022,   NPO Start Time: 23:59  Except Medications (06-22-22 @ 09:23) [Active]

## 2022-06-22 NOTE — CONSULT NOTE ADULT - PROBLEM SELECTOR RECOMMENDATION 9
Type 2 A1c 9.0% adm hypoglycemia  Recommend endocrine-[facility] on consult  FU appt: TBA  DSC recommendations: return to home with modified regimen and glucose monitoring  diabetes education provided  Diabetes support info and cell # [phone] given   Goal 100-180 mg/dL; 140-180 mg/dL in critical care areas

## 2022-06-22 NOTE — DISCHARGE NOTE PROVIDER - HOSPITAL COURSE
FROM ADMISSION H+P:   HPI:  Patient is a 71 year old female with PMH of A.fib rate controlled not on AC for hx of multiple falls, T2DM, HTN, HLD, ESRD on HD, CHF, s/p CABG brought in by EMS for generalized weakness at home. Patient states that she was doing well when in the afternoon she tried to get up from her couch and felt weak in the LE and fell back in her couch. Denies any hx of head trauma or loss of consciousness. Denies any weakness or numbness. Denies any chest pain, SOB or palpitations. No fever, cough or chills. No hx of recent travel or sick contacts. At the time of EMS arrival patient was found to have POCBS of 50. EMS gave dextrose and on arrival to the ED patient blood sugar was found to be in 30's with hypothermia of 94.9.    ED course:   Vitals:   BP: 176/85  HR:76  Temp:94.2  RR:18, O2:96% on RA   Significant Labs:   CBC: WBC:16.82 Hb:13.2 , Platlets: 260, Neutro:89   CMP: Lytes: WNL, BUN/Creatinine:48/4.8, Glucose:134 , Alkaline Phos:128, AST, 41, eGFR: 9, HsTrop: 275.9, ProBNP: [id number], Lactate: 2  UA; negative  CXR: Heart and lung appear normal (self read)  CT BRAIN: No acute intracranial bleeding. Central volume loss, chronic microvascular ischemic changes, and chronic bilateral lacunar infarctions.  CT CERVICAL SPINE: No fracture. Grade 1 C4-C5 anterolisthesis. C6-C7 disc degeneration. Bilateral pleural effusions and interlobular septal thickening due to   interstitial edema.  EKG: NSR, left axis deviation, HR 71,   QT/QTc: 426/462  Patient received: Ceftriaxone 1 g x1, Dextrose 5% + NS 1L x1, Dextrose 50% IV X1, heating blanket, 2L NC   (16 Jun 2022 01:19)      ---  HOSPITAL COURSE:   71 year old female with PMH of A.fib rate controlled not on AC for hx of multiple falls, T2DM, HTN, HLD, ESRD on HD, CHF, s/p CABG brought in by EMS for generalized weakness at home, hypothermia, hypoglycemia 2/2 sepsis from R hallux gangrene with superimposed infection and past CTa and TOMMY consistent with acute ischemic of R leg s/p fem pop bypass POD 1. Patient transferred to ICU for continuous neurochecks.   ---  CONSULTANTS:   Cardiology: Dr. James   Endocrinology: Dr. Gonzales   Vascular: Dr. Miller     ---  TIME SPENT:  I, the attending physician, was physically present for the key portions of the evaluation and management (E/M) service provided. The total amount of time spent reviewing the hospital notes, laboratory values, imaging findings, assessing/counseling the patient, discussing with consultant physicians, social work, nursing staff was -- minutes    ---  Primary care provider was made aware of plan for discharge:      [  ] NO     [  ] YES   FROM ADMISSION H+P:   HPI:  Patient is a 71 year old female with PMH of A.fib rate controlled not on AC for hx of multiple falls, T2DM, HTN, HLD, ESRD on HD, CHF, s/p CABG brought in by EMS for generalized weakness at home. Patient states that she was doing well when in the afternoon she tried to get up from her couch and felt weak in the LE and fell back in her couch. Denies any hx of head trauma or loss of consciousness. Denies any weakness or numbness. Denies any chest pain, SOB or palpitations. No fever, cough or chills. No hx of recent travel or sick contacts. At the time of EMS arrival patient was found to have POCBS of 50. EMS gave dextrose and on arrival to the ED patient blood sugar was found to be in 30's with hypothermia of 94.9.    ED course:   Vitals:   BP: 176/85  HR:76  Temp:94.2  RR:18, O2:96% on RA   Significant Labs:   CBC: WBC:16.82 Hb:13.2 , Platlets: 260, Neutro:89   CMP: Lytes: WNL, BUN/Creatinine:48/4.8, Glucose:134 , Alkaline Phos:128, AST, 41, eGFR: 9, HsTrop: 275.9, ProBNP: [id number], Lactate: 2  UA; negative  CXR: Heart and lung appear normal (self read)  CT BRAIN: No acute intracranial bleeding. Central volume loss, chronic microvascular ischemic changes, and chronic bilateral lacunar infarctions.  CT CERVICAL SPINE: No fracture. Grade 1 C4-C5 anterolisthesis. C6-C7 disc degeneration. Bilateral pleural effusions and interlobular septal thickening due to   interstitial edema.  EKG: NSR, left axis deviation, HR 71,   QT/QTc: 426/462  Patient received: Ceftriaxone 1 g x1, Dextrose 5% + NS 1L x1, Dextrose 50% IV X1, heating blanket, 2L NC   (16 Jun 2022 01:19)      ---  HOSPITAL COURSE:   71 year old female with PMH of A.fib rate controlled not on AC for hx of multiple falls, T2DM, HTN, HLD, ESRD on HD, CHF, s/p CABG brought in by EMS for generalized weakness at home, hypothermia, hypoglycemia 2/2 sepsis from R hallux gangrene with superimposed infection and past CTa and TOMMY consistent with acute ischemic of R leg s/p fem pop bypass POD 1. Patient with history of L medial osteomyelitis with cultures positive to klebsiella, e.coli, MSSA which was sensitive to Rocephin so was re-started through out hospital course. Patient transferred to ICU for continuous neurochecks.   ---  CONSULTANTS:   Cardiology: Dr. James   Endocrinology: Dr. Gonzales   Vascular: Dr. Miller   Infectious Disease: Dr. Long     ---  TIME SPENT:  I, the attending physician, was physically present for the key portions of the evaluation and management (E/M) service provided. The total amount of time spent reviewing the hospital notes, laboratory values, imaging findings, assessing/counseling the patient, discussing with consultant physicians, social work, nursing staff was -- minutes    ---  Primary care provider was made aware of plan for discharge:      [  ] NO     [  ] YES   FROM ADMISSION H+P:   HPI:  Patient is a 71 year old female with PMH of A.fib rate controlled not on AC for hx of multiple falls, T2DM, HTN, HLD, ESRD on HD, CHF, s/p CABG brought in by EMS for generalized weakness at home. Patient states that she was doing well when in the afternoon she tried to get up from her couch and felt weak in the LE and fell back in her couch. Denies any hx of head trauma or loss of consciousness. Denies any weakness or numbness. Denies any chest pain, SOB or palpitations. No fever, cough or chills. No hx of recent travel or sick contacts. At the time of EMS arrival patient was found to have POCBS of 50. EMS gave dextrose and on arrival to the ED patient blood sugar was found to be in 30's with hypothermia of 94.9.    ED course:   Vitals:   BP: 176/85  HR:76  Temp:94.2  RR:18, O2:96% on RA   Significant Labs:   CBC: WBC:16.82 Hb:13.2 , Platlets: 260, Neutro:89   CMP: Lytes: WNL, BUN/Creatinine:48/4.8, Glucose:134 , Alkaline Phos:128, AST, 41, eGFR: 9, HsTrop: 275.9, ProBNP: 369253, Lactate: 2  UA; negative  CXR: Heart and lung appear normal (self read)  CT BRAIN: No acute intracranial bleeding. Central volume loss, chronic microvascular ischemic changes, and chronic bilateral lacunar infarctions.  CT CERVICAL SPINE: No fracture. Grade 1 C4-C5 anterolisthesis. C6-C7 disc degeneration. Bilateral pleural effusions and interlobular septal thickening due to   interstitial edema.  EKG: NSR, left axis deviation, HR 71,   QT/QTc: 426/462  Patient received: Ceftriaxone 1 g x1, Dextrose 5% + NS 1L x1, Dextrose 50% IV X1, heating blanket, 2L NC   (16 Jun 2022 01:19)      ---  HOSPITAL COURSE:   71 year old female with PMH of A.fib rate controlled not on AC for hx of multiple falls, T2DM, HTN, HLD, ESRD on HD, CHF, s/p CABG brought in by EMS for generalized weakness at home, hypothermia, hypoglycemia 2/2 sepsis from R hallux gangrene with superimposed infection and past CTa and TOMMY consistent with acute ischemic of R leg s/p fem pop bypass POD 1. Patient with history of L medial osteomyelitis with cultures positive to klebsiella, e.coli, MSSA which was sensitive to Rocephin so was re-started through out hospital course. Patient transferred to ICU for continuous neurochecks. Patient was downgraded to the floor. Pt was placed on a course of IV Unasyn with a stop date of 7/26/22. Pt has had some episodes of AMS. PT was consulted to ____.   ---  CONSULTANTS:   Cardiology: Dr. James   Endocrinology: Dr. Gonzales   Vascular: Dr. Miller   Infectious Disease: Dr. Long     ---  TIME SPENT:  I, the attending physician, was physically present for the key portions of the evaluation and management (E/M) service provided. The total amount of time spent reviewing the hospital notes, laboratory values, imaging findings, assessing/counseling the patient, discussing with consultant physicians, social work, nursing staff was -- minutes    ---  Primary care provider was made aware of plan for discharge:      [  ] NO     [  ] YES   FROM ADMISSION H+P:   HPI:  Patient is a 71 year old female with PMH of A.fib rate controlled not on AC for hx of multiple falls, T2DM, HTN, HLD, ESRD on HD, CHF, s/p CABG brought in by EMS for generalized weakness at home. Patient states that she was doing well when in the afternoon she tried to get up from her couch and felt weak in the LE and fell back in her couch. Denies any hx of head trauma or loss of consciousness. Denies any weakness or numbness. Denies any chest pain, SOB or palpitations. No fever, cough or chills. No hx of recent travel or sick contacts. At the time of EMS arrival patient was found to have POCBS of 50. EMS gave dextrose and on arrival to the ED patient blood sugar was found to be in 30's with hypothermia of 94.9.    ED course:   Vitals:   BP: 176/85  HR:76  Temp:94.2  RR:18, O2:96% on RA   Significant Labs:   CBC: WBC:16.82 Hb:13.2 , Platlets: 260, Neutro:89   CMP: Lytes: WNL, BUN/Creatinine:48/4.8, Glucose:134 , Alkaline Phos:128, AST, 41, eGFR: 9, HsTrop: 275.9, ProBNP: 655696, Lactate: 2  UA; negative  CXR: Heart and lung appear normal (self read)  CT BRAIN: No acute intracranial bleeding. Central volume loss, chronic microvascular ischemic changes, and chronic bilateral lacunar infarctions.  CT CERVICAL SPINE: No fracture. Grade 1 C4-C5 anterolisthesis. C6-C7 disc degeneration. Bilateral pleural effusions and interlobular septal thickening due to   interstitial edema.  EKG: NSR, left axis deviation, HR 71,   QT/QTc: 426/462  Patient received: Ceftriaxone 1 g x1, Dextrose 5% + NS 1L x1, Dextrose 50% IV X1, heating blanket, 2L NC   (16 Jun 2022 01:19)      ---  HOSPITAL COURSE:   71 year old female with PMH of A.fib rate controlled not on AC for hx of multiple falls, T2DM, HTN, HLD, ESRD on HD, CHF, s/p CABG brought in by EMS for generalized weakness at home, hypothermia, hypoglycemia 2/2 sepsis from R hallux gangrene with superimposed infection and past CTa and TOMMY consistent with acute ischemic of R leg s/p fem pop bypass POD 1. Patient with history of L medial osteomyelitis with cultures positive to klebsiella, e.coli, MSSA which was sensitive to Rocephin so was re-started through out hospital course. Patient transferred to ICU for continuous neurochecks. Patient was downgraded to the floor. Pt was placed on a course of IV Unasyn with a stop date of 7/26/22. Pt has had some episodes of AMS. PT was consulted to assess ambulatory status and ability to safely perform ADL. PT recommended [facility] placement after discharge. Pt was accepted to [facility] with hemodialysis on site 7/7.   ---  CONSULTANTS:   Cardiology: Dr. James   Endocrinology: Dr. Gonzales   Vascular: Dr. Miller   Infectious Disease: Dr. Long   ---  REVIEW OF SYSTEMS:  CONSTITUTIONAL: no fevers or chills  HEENT: no headache or sore throat   RESPIRATORY: no cough, wheeze or SOB  CARDIOVASCULAR: no chest pain or palpitations  GASTROINTESTINAL: no abd pain, n/v/d  GENITOURINARY: no dysuria, frequency, or hematuria  NEUROLOGICAL: no focal weakness or dizziness  MUSCULOSKELETAL: no myalgias      ---  TIME SPENT:  I, the attending physician, was physically present for the key portions of the evaluation and management (E/M) service provided. The total amount of time spent reviewing the hospital notes, laboratory values, imaging findings, assessing/counseling the patient, discussing with consultant physicians, social work, nursing staff was -- minutes    ---  Primary care provider was made aware of plan for discharge:      [  ] NO     [  ] YES   FROM ADMISSION H+P:   HPI:  Patient is a 71 year old female with PMH of A.fib rate controlled not on AC for hx of multiple falls, T2DM, HTN, HLD, ESRD on HD, CHF, s/p CABG brought in by EMS for generalized weakness at home. Patient states that she was doing well when in the afternoon she tried to get up from her couch and felt weak in the LE and fell back in her couch. Denies any hx of head trauma or loss of consciousness. Denies any weakness or numbness. Denies any chest pain, SOB or palpitations. No fever, cough or chills. No hx of recent travel or sick contacts. At the time of EMS arrival patient was found to have POCBS of 50. EMS gave dextrose and on arrival to the ED patient blood sugar was found to be in 30's with hypothermia of 94.9.    ED course:   Vitals:   BP: 176/85  HR:76  Temp:94.2  RR:18, O2:96% on RA   Significant Labs:   CBC: WBC:16.82 Hb:13.2 , Platlets: 260, Neutro:89   CMP: Lytes: WNL, BUN/Creatinine:48/4.8, Glucose:134 , Alkaline Phos:128, AST, 41, eGFR: 9, HsTrop: 275.9, ProBNP: 954464, Lactate: 2  UA; negative  CXR: Heart and lung appear normal (self read)  CT BRAIN: No acute intracranial bleeding. Central volume loss, chronic microvascular ischemic changes, and chronic bilateral lacunar infarctions.  CT CERVICAL SPINE: No fracture. Grade 1 C4-C5 anterolisthesis. C6-C7 disc degeneration. Bilateral pleural effusions and interlobular septal thickening due to   interstitial edema.  EKG: NSR, left axis deviation, HR 71,   QT/QTc: 426/462  Patient received: Ceftriaxone 1 g x1, Dextrose 5% + NS 1L x1, Dextrose 50% IV X1, heating blanket, 2L NC   (16 Jun 2022 01:19)      ---  HOSPITAL COURSE:   71 year old female with PMH of A.fib rate controlled not on AC for hx of multiple falls, T2DM, HTN, HLD, ESRD on HD, CHF, s/p CABG brought in by EMS for generalized weakness at home, hypothermia, hypoglycemia 2/2 sepsis from R hallux gangrene with superimposed infection and past CTa and TOMMY consistent with acute ischemic of R leg s/p fem pop bypass POD 1. Patient with history of L medial osteomyelitis with cultures positive to klebsiella, e.coli, MSSA which was sensitive to Rocephin so was re-started through out hospital course. Patient transferred to ICU for continuous neurochecks. Patient was downgraded to the floor. Pt was placed on a course of IV Unasyn with a stop date of 7/26/22. Pt has had some episodes of AMS. PT was consulted to assess ambulatory status and ability to safely perform ADL. PT recommended [facility] placement after discharge. Pt was accepted to [facility] with hemodialysis on site 7/7. Patient was seen and examined on date of discharge.  ---  CONSULTANTS:   Cardiology: Dr. James   Endocrinology: Dr. Gonzales   Vascular: Dr. Miller   Infectious Disease: Dr. Long   ---  REVIEW OF SYSTEMS:  CONSTITUTIONAL: no fevers or chills  HEENT: no headache or sore throat   RESPIRATORY: no cough, wheeze or SOB  CARDIOVASCULAR: no chest pain or palpitations  GASTROINTESTINAL: no abd pain, n/v/d  GENITOURINARY: no dysuria, frequency, or hematuria  NEUROLOGICAL: no focal weakness or dizziness  MUSCULOSKELETAL: no myalgias      ---  TIME SPENT:  I, the attending physician, was physically present for the key portions of the evaluation and management (E/M) service provided. The total amount of time spent reviewing the hospital notes, laboratory values, imaging findings, assessing/counseling the patient, discussing with consultant physicians, social work, nursing staff was -- minutes    ---  Primary care provider was made aware of plan for discharge:      [  ] NO     [  ] YES   FROM ADMISSION H+P:   HPI:  Patient is a 71 year old female with PMH of A.fib rate controlled not on AC for hx of multiple falls, T2DM, HTN, HLD, ESRD on HD, CHF, s/p CABG brought in by EMS for generalized weakness at home. Patient states that she was doing well when in the afternoon she tried to get up from her couch and felt weak in the LE and fell back in her couch. Denies any hx of head trauma or loss of consciousness. Denies any weakness or numbness. Denies any chest pain, SOB or palpitations. No fever, cough or chills. No hx of recent travel or sick contacts. At the time of EMS arrival patient was found to have POCBS of 50. EMS gave dextrose and on arrival to the ED patient blood sugar was found to be in 30's with hypothermia of 94.9.    ED course:   Vitals:   BP: 176/85  HR:76  Temp:94.2  RR:18, O2:96% on RA   Significant Labs:   CBC: WBC:16.82 Hb:13.2 , Platlets: 260, Neutro:89   CMP: Lytes: WNL, BUN/Creatinine:48/4.8, Glucose:134 , Alkaline Phos:128, AST, 41, eGFR: 9, HsTrop: 275.9, ProBNP: 397990, Lactate: 2  UA; negative  CXR: Heart and lung appear normal (self read)  CT BRAIN: No acute intracranial bleeding. Central volume loss, chronic microvascular ischemic changes, and chronic bilateral lacunar infarctions.  CT CERVICAL SPINE: No fracture. Grade 1 C4-C5 anterolisthesis. C6-C7 disc degeneration. Bilateral pleural effusions and interlobular septal thickening due to   interstitial edema.  EKG: NSR, left axis deviation, HR 71,   QT/QTc: 426/462  Patient received: Ceftriaxone 1 g x1, Dextrose 5% + NS 1L x1, Dextrose 50% IV X1, heating blanket, 2L NC   (16 Jun 2022 01:19)      ---  HOSPITAL COURSE:   71 year old female with PMH of A.fib rate controlled not on AC for hx of multiple falls, T2DM, HTN, HLD, ESRD on HD, CHF, s/p CABG brought in by EMS for generalized weakness at home, hypothermia, hypoglycemia 2/2 sepsis from R hallux gangrene with superimposed infection and past CTa and TOMMY consistent with acute ischemic of R leg s/p fem pop bypass POD 1. Patient with history of L medial osteomyelitis with cultures positive to klebsiella, e.coli, MSSA which was sensitive to Rocephin so was re-started through out hospital course. Patient transferred to ICU for continuous neurochecks. Patient was downgraded to the floor. Pt was placed on a course of IV Unasyn with a stop date of 7/26/22. Pt has had some episodes of AMS. PT was consulted to assess ambulatory status and ability to safely perform ADL. PT recommended [facility] placement after discharge. Pt was accepted to [facility] with hemodialysis on site 7/7. Patient was seen and examined on date of discharge.  ---  CONSULTANTS:   Cardiology: Dr. James   Endocrinology: Dr. Gonzales   Vascular: Dr. Miller   Infectious Disease: Dr. Long   ---  REVIEW OF SYSTEMS:  CONSTITUTIONAL: no fevers or chills  HEENT: no headache or sore throat   RESPIRATORY: no cough, wheeze or SOB  CARDIOVASCULAR: no chest pain or palpitations  GASTROINTESTINAL: no abd pain, n/v/d  GENITOURINARY: no dysuria, frequency, or hematuria  NEUROLOGICAL: no focal weakness or dizziness  MUSCULOSKELETAL: no myalgias      ---  TIME SPENT:  I, the attending physician, was physically present for the key portions of the evaluation and management (E/M) service provided. The total amount of time spent reviewing the hospital notes, laboratory values, imaging findings, assessing/counseling the patient, discussing with consultant physicians, social work, nursing staff was 38 minutes

## 2022-06-22 NOTE — PROGRESS NOTE ADULT - SUBJECTIVE AND OBJECTIVE BOX
The patient was interviewed and evaluated.    71y Female    T(C): 36.3 (06-22-22 @ 08:16), Max: 36.7 (06-21-22 @ 10:35)  HR: 57 (06-22-22 @ 09:00) (55 - 68)  BP: 118/58 (06-22-22 @ 09:00) (107/51 - 174/70)  RR: 21 (06-22-22 @ 09:00) (11 - 22)  SpO2: 100% (06-22-22 @ 09:00) (92% - 100%)  Wt(kg): --    No Nausea/vomiting, recall, sore throat or headache.    No anesthesia related complaints or sequelae.

## 2022-06-22 NOTE — DISCHARGE NOTE PROVIDER - PROVIDER TOKENS
PROVIDER:[TOKEN:[[id number]],FOLLOWUP:[2 weeks]],PROVIDER:[TOKEN:[[id number]],FOLLOWUP:[2 weeks]],PROVIDER:[TOKEN:[[id number]],FOLLOWUP:[Routine]]

## 2022-06-22 NOTE — PROGRESS NOTE ADULT - SUBJECTIVE AND OBJECTIVE BOX
[facility]  INFECTIOUS DISEASES   [address] [place] [address]  Tel: [phone]     Fax: [phone]  ======================================================  MD Zita Alejandra Kaushal, MD Cho, Michelle, MD   ======================================================    [id number]  SHILO KOHLER     Follow up: R foot wound/gangrene     Had fem/pop yesterday. No fever, in ICU.   Some pain in leg.     PAST MEDICAL & SURGICAL HISTORY:  Diabetes mellitus II  HTN (hypertension)  h/o Anxiety attack  Depression  h/o Myocardial infarct 2007  CAD (coronary artery disease)  h/o Hepatitis A 1969  currently resolved, no symptoms  PAD (peripheral artery disease)  Murmur, cardiac  h/o Smoking  quitted 3/2012  CRF (chronic renal failure), unspecified stage  Dialysis patient  Anemia secondary to renal failure  HTN (hypertension)  coronary stent 2007  s/p Ovarian cyst removal  s/p surgical removal of benign Skin lesion epigastric area    Social Hx: no current smoking, ETOH or drugs     FAMILY HISTORY:  No pertinent family history in first degree relatives    Allergies  latex (Unknown)  No Known Drug Allergies    Antibiotics:  zosyn     REVIEW OF SYSTEMS:  CONSTITUTIONAL:  No Fever or chills  HEENT:  No diplopia or blurred vision.  No sore throat or runny nose.  CARDIOVASCULAR:  No chest pain or SOB.  RESPIRATORY:  No cough, shortness of breath, PND or orthopnea.  GASTROINTESTINAL:  No nausea, vomiting or diarrhea.  GENITOURINARY:  No dysuria, frequency or urgency. No Blood in urine  MUSCULOSKELETAL:  pain at surgical site   SKIN:  R foot wound   NEUROLOGIC:  No paresthesias, fasciculations, seizures or weakness.  PSYCHIATRIC:  No disorder of thought or mood.  ENDOCRINE:  No heat or cold intolerance, polyuria or polydipsia.  HEMATOLOGICAL:  No easy bruising or bleeding.     Physical Exam:  Vital Signs Last 24 Hrs  T(C): 36.4 (22 Jun 2022 13:35), Max: 36.4 (21 Jun 2022 17:45)  T(F): 97.5 (22 Jun 2022 13:35), Max: 97.5 (21 Jun 2022 17:45)  HR: 61 (22 Jun 2022 14:00) (57 - 68)  BP: 130/64 (22 Jun 2022 14:00) (99/50 - 174/70)  BP(mean): 92 (22 Jun 2022 14:00) (71 - 101)  RR: 25 (22 Jun 2022 14:00) (11 - 25)  SpO2: 100% (22 Jun 2022 14:00) (92% - 100%)  GEN: NAD  HEENT: normocephalic and atraumatic. EOMI. PERRL.    NECK: Supple.  No lymphadenopathy   LUNGS: Clear to auscultation.  HEART: Irregular rate and rhythm   ABDOMEN: Soft, nontender, and nondistended.  Positive bowel sounds.    : No CVA tenderness  EXTREMITIES: R hallux with an ulcer, dry gangrene   Now after surgery has dressing.   R heel ulcer very superficial,   left medial malleolus small ulcer minimal serous discharge   NEUROLOGIC: grossly intact.  PSYCHIATRIC: Appropriate affect .  SKIN: No rash       Labs:                        10.8   13.25 )-----------( 339      ( 22 Jun 2022 05:25 )             35.3     06-22    136  |  99  |  34<H>  ----------------------------<  208<H>  5.0   |  25  |  4.30<H>    Ca    8.0<L>      22 Jun 2022 05:25  Phos  5.7     06-22  Mg     2.2     06-22    TPro  5.7<L>  /  Alb  2.3<L>  /  TBili  0.3  /  DBili  x   /  AST  14<L>  /  ALT  12  /  AlkPhos  88  06-22    Culture - Urine (collected 06-16-22 @ 03:40)  Source: Clean Catch Clean Catch (Midstream)  Final Report (06-17-22 @ 07:32):    <10,000 CFU/mL Normal Urogenital Shantel    Culture - Blood (collected 06-16-22 @ 03:38)  Source: .Blood Blood-Peripheral  Final Report (06-21-22 @ 04:00):    No Growth Final    Culture - Blood (collected 06-16-22 @ 03:38)  Source: .Blood Blood-Peripheral  Final Report (06-21-22 @ 04:00):    No Growth Final    WBC Count: 13.25 K/uL (06-22-22 @ 05:25)  WBC Count: 17.74 K/uL (06-21-22 @ 21:00)  WBC Count: 10.18 K/uL (06-21-22 @ 09:22)  WBC Count: 9.64 K/uL (06-20-22 @ 07:04)  WBC Count: 9.99 K/uL (06-19-22 @ 07:18)  WBC Count: 10.73 K/uL (06-18-22 @ 07:54)    Creatinine, Serum: 4.30 mg/dL (06-22-22 @ 05:25)  Creatinine, Serum: 3.90 mg/dL (06-21-22 @ 21:00)  Creatinine, Serum: 4.80 mg/dL (06-20-22 @ 07:04)  Creatinine, Serum: 3.30 mg/dL (06-19-22 @ 07:18)  Creatinine, Serum: 4.90 mg/dL (06-18-22 @ 07:54)    C-Reactive Protein, Serum: 19 mg/L (06-17-22 @ 10:38)    Sedimentation Rate, Erythrocyte: 13 mm/hr (06-17-22 @ 07:52)    COVID-19 PCR: NotDetec (06-20-22 @ 08:14)  SARS-CoV-2: NotDete (06-15-22 @ 22:44)    All imaging and other data have been reviewed.  < from: CT Chest No Cont (06.16.22 @ 08:16) >  IMPRESSION:  Small layering bilateral pleural effusions decreased from 4/6/2022.  7 mm groundglass nodule within left lower lobe (series 2 image 44).   Recommend follow-up chest CT in 12 months to determine stability.    Assessment and Plan:   70 yo woman with PMH of HTN, DM2, CAD, CHF, A.fib, multiple falls and ESRD on HD was admitted on 6/16, came to ED with generalized weakness.   Her hypoglycemia and hypothermia on admission could be related to sepsis/SIRS source unclear at this time, could be foot infection? Left hallux looks like  a dry gangrene with superimposed infection.   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 She had Tissue cultures from 3/30/22 grew   klebsiella oxytoca/raoutella oxytoca, E. coli, MSSA so Cefazolin was given after HD to complete the course of treatment.   ESR 13 and CRP 19  MRSA PCR negative   6/21 s/p Femoral popliteal bypass with prosthetic graft    Recommendations:  - Blood culture x 2 NGTD  - leukocytosis reactive after surgery   - Vascular surgery follow up noted, for revascularization today  - Podiatry follow up noted for possible amputation on 6/23 if patient agrees(refused in the past)  - Will continue ceftriaxone based on old culture    Will follow.    Geovany Long MD  Division of Infectious Diseases   Please call ID service at [phone] with any question.      35 minutes spent on total encounter assessing patient, examination, chart reivew, counseling and coordinating care by the attending physician/nurse/care manager.

## 2022-06-22 NOTE — PROGRESS NOTE ADULT - ASSESSMENT
70 yo woman with PMH of HTN, DM2, CAD, CHF, PAD, A.fib, multiple falls and ESRD on HD was admitted on 6/16, came to ED with generalized weakness.   Her hypoglycemia and hypothermia on admission could be related to sepsis/SIRS. Source likely sec to Right hallux with a dry gangrene and superimposed infection.   TOMMY/PVR's noted.   Now S/P R fem-BK pop w/PTFE                 POD# 1  Doing well. Bypass patent    #PAD Chronic limb ischemia with multilevel occlusive PAD on right.  Cont DAPT for now. Will likely need Xarelto/Eliquis with Plavix on DC for graft patency  Maintain Prevena x 5 days  Neurovasc checks  OOB/PT this afternoon  ABX continue  Podiatry input appreciated. Plan for R hallux amputation 6/23    #ESRD on HD  HD as per renal    #DM uncontrolled  Lantus and SS coverage as per medicine    Discussed with Dr Miller

## 2022-06-22 NOTE — PROGRESS NOTE ADULT - ASSESSMENT
Patient is a 71 year old female with PMH of A.fib rate controlled not on AC for hx of multiple falls, T2DM, HTN, HLD, ESRD on HD, CHF, s/p CABG brought in by EMS for generalized weakness at home. Patient states that she was doing well when in the afternoon she tried to get up from her couch and felt weak in the LE and fell back in her couch. Denies any hx of head trauma or loss of consciousness. Denies any weakness or numbness. Denies any chest pain, SOB or palpitations. No fever, cough or chills. No hx of recent travel or sick contacts. At the time of EMS arrival patient was found to have POCBS of 50. EMS gave dextrose and on arrival to the ED patient blood sugar was found to be in 30's with hypothermia of 94.9.    ED course:   Vitals:   BP: 176/85  HR:76  Temp:94.2  RR:18, O2:96% on RA   Significant Labs:   CBC: WBC:16.82 Hb:13.2 , Platlets: 260, Neutro:89   CMP: Lytes: WNL, BUN/Creatinine:48/4.8, Glucose:134 , Alkaline Phos:128, AST, 41, eGFR: 9, HsTrop: 275.9, ProBNP: 444253, Lactate: 2  UA; negative  CXR: Heart and lung appear normal (self read)  CT BRAIN: No acute intracranial bleeding. Central volume loss, chronic microvascular ischemic changes, and chronic bilateral lacunar infarctions.  CT CERVICAL SPINE: No fracture. Grade 1 C4-C5 anterolisthesis. C6-C7 disc degeneration. Bilateral pleural effusions and interlobular septal thickening due to   interstitial edema.  EKG: NSR, left axis deviation, HR 71,   QT/QTc: 426/462  Patient received: Ceftriaxone 1 g x1, Dextrose 5% + NS 1L x1, Dextrose 50% IV X1, heating blanket, 2L NC   (16 Jun 2022 01:19)      esrd on hd plan for hd today       ANEMIA PLAN:  Anemia of chronic disease:  We will continue epo  aiming for a HCT of 32-36 %.   We will monitor Iron stores, B12 and RBC folate .    BP monitoring,continue current antihypertensive meds, low salt diet  metoprolol tartrate 100 milliGRAM(s) Oral every 12 hours  cloNIDine 0.1 milliGRAM(s) Oral two times a day    f/u  blood and urine cx,serial lactate levels,monitor vitals closley,ivfs hydration,monitor urine output and renal profile,iv abx

## 2022-06-22 NOTE — PROGRESS NOTE ADULT - ASSESSMENT
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 Lantus dec to 10 units qhs by endocrine. HD per nephro. Trops downtrended, was likely elevated from ESRD.    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 Lantus dec to 10 units qhs by endocrine. HD per nephro. Trops downtrended, was likely elevated from ESRD. will need cardiac clearance prior to any procedure or intervention.    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 Lantus dec to 10 units qhs by endocrine. HD per nephro. Trops downtrended, was likely elevated from ESRD. will need cardiac clearance -- noted. Plan for RLE revascularization tomorrow w/ vasc team. NPO after MN, active T+S, blood products on hold, will have HD session today. Likely podiatric intervention Thurs or Fri reg amputation of great hallux. PLAVIX HELD, RESUME POST PROCEDURE IF cleared by VASC    6/21/22 - Pt is an RCRI Class IV risk candidate going for intermediate risk procedure, and is medically optimized for this procedure. Will need close intraop cardiac monitoring. Cardiac clearance noted. Labs, VS reviewed. ECG w/ TWI in few precordial leads, cardio following. tentative plan for great toe amp Thurs or Fri, pods following. C/w Rocephin per ID. Continue holding Plavix in anticipation of potential amputation per pods.  *could not reach ICE contact by phone     Problem/Plan - 1:  ·  Problem: Hypoglycemia.  ·  Plan: - Patient presented with c/o generalized weakness and fall and was found to have blood sugar in 30's  - S/p Dextrose 50% IV X1 in the ED and D5 NS 1L X1 with appropriate response  - Patient with hx of T2DM, on Lantus 40 units qhs not on any oral hypoglycemics per outpatient pharmacy   - Accu cheks ac/hs  - endocrine consulted.     Problem/Plan - 2:  ·  Problem: SIRS (systemic inflammatory response syndrome).  ·  Plan: -- CXR: no clear evidence of PNA   - Hx of L medial malleolus growing klebsiella oxytoca/raoutella oxytoca, E. coli, MSSA. Recent blood cultures negative. On Rocephin per ID. Amputation planned for 06/23/2022. Trend WBC/fevers/procalcitonin  -   - ID Dr. Tsai consulted, will follow.   Problem/Plan - 3:  ·  Problem: Pleural effusion.   ·  Plan: - No Hx of pulmonary disease  - Patient does not c/o cough, fever or chills  - CT Cervical shows Bilateral pleural effusions and interlobular septal thickening due to interstitial edema.  - c/w ceftriaxone   - ID Dr. Tsai, consulted, will appreciate recs.     Problem/Plan - 4:  ·  Problem: HFrEF (heart failure with reduced ejection fraction).   ·  Plan: - patient with hx of HFrEF  - last ECHO in 04/22 shows EF of 45%  - Admission labs show ProBNP of [id number]  - Likely elevated in the setting of ESRD  - Consult Dr. James, will appreciate recs   - Avoid excessive fluids.     Problem/Plan - 5:  ·  Problem: Elevated troponin.   ·  Plan: - Admission labs show elevated Troponin of 275.9  - Patient denies any chest pain, sob or palpitations  - EKG shows NSR with left axis deviation with non specific ST and T waves changes  - elevated troponin likely due to ESRD  - trend x3 or to peak.     Problem/Plan - 6:  ·  Problem: ESRD on hemodialysis.   ·  Plan: - Patient with hx of ESRD  - On HD TTS schedule  - admission eGFR 9  - Follow Dr. Edwards      Problem/Plan - 7:  ·  Problem: T2DM (type 2 diabetes mellitus).   ·  Plan: - Chronic stable  - On Lantus 40 qhs  - Will start on lantus 20 units qhs with LDSS given hypoglycemic episode  - Accu checks AC/HS  - Adjust insulin requirement based on blood sugar levels  - per outpatient pharmacy patient recently rx ademolog however has not yet picked up and pharmacy unsure of dose   - endocrinology consult, Dr. Perlman.     Problem/Plan - 8:  ·  Problem: Atrial fibrillation.   ·  Plan: - Patient with hx of P A.Fib  - Not on any AC because of hx of multiple falls   - on Lopressor 100mg q12 and Diltiazem 60mg q8 - will continue with hold parameters  - EKG shows NSR with left axis deviation with non specific ST and T waves changes.     Problem/Plan - 9:  ·  Problem: Benign essential HTN.   ·  Plan: - Chronic stable  - Continue Amlodipine 5 mg with hold parameters  - Dash/Renal Diet  - continue to monitor routine hemodynamics.     Problem/Plan - 10:  ·  Problem: HLD (hyperlipidemia).   ·  Plan; - Chronic stable  - On atorvastatin 40mg at home - will continue  - Dash/Renal diet.     Problem/Plan - 11:  ·  Problem: Depression, major.   ·  Plan: - Chronic stable  - Continue imipramine 50qhs.     Problem/Plan - 12:  ·  Problem: Need for prophylactic measure.   ·  Plan: - DVT Prophylaxis: Heparin 5000 units q12.

## 2022-06-22 NOTE — DIETITIAN INITIAL EVALUATION ADULT - ADD RECOMMEND
1) Continue renal, Consistent carbohydrate diet  2) Add Nepro BID  3) Monitor PO intake, diet tolerance, weight trends, labs, GI function, and skin integrity

## 2022-06-22 NOTE — PROGRESS NOTE ADULT - SUBJECTIVE AND OBJECTIVE BOX
Patient is a 71y old  Female who presents with a chief complaint of SIRS (22 Jun 2022 14:33)      INTERVAL HPI/OVERNIGHT EVENTS: Pt seen and examined bedside, has no complaints. Pt is on HD in ICU. for LT toe amputation in am.    MEDICATIONS  (STANDING):  acetaminophen     Tablet .. 1000 milliGRAM(s) Oral every 6 hours  amLODIPine   Tablet 5 milliGRAM(s) Oral daily  aspirin enteric coated 81 milliGRAM(s) Oral daily  atorvastatin 40 milliGRAM(s) Oral at bedtime  calcium acetate 667 milliGRAM(s) Oral three times a day with meals  cefTRIAXone   IVPB 1000 milliGRAM(s) IV Intermittent every 24 hours  chlorhexidine 4% Liquid 1 Application(s) Topical <User Schedule>  cloNIDine 0.1 milliGRAM(s) Oral two times a day  clopidogrel Tablet 75 milliGRAM(s) Oral daily  dextrose 5%. 1000 milliLiter(s) (50 mL/Hr) IV Continuous <Continuous>  dextrose 5%. 1000 milliLiter(s) (100 mL/Hr) IV Continuous <Continuous>  dextrose 50% Injectable 25 Gram(s) IV Push once  dextrose 50% Injectable 12.5 Gram(s) IV Push once  dextrose 50% Injectable 25 Gram(s) IV Push once  diltiazem    Tablet 60 milliGRAM(s) Oral every 8 hours  glucagon  Injectable 1 milliGRAM(s) IntraMuscular once  heparin   Injectable 5000 Unit(s) SubCutaneous every 12 hours  imipramine 50 milliGRAM(s) Oral daily  insulin glargine Injectable (LANTUS) 8 Unit(s) SubCutaneous at bedtime  insulin lispro (ADMELOG) corrective regimen sliding scale   SubCutaneous three times a day before meals  insulin lispro (ADMELOG) corrective regimen sliding scale   SubCutaneous at bedtime  metoprolol tartrate 100 milliGRAM(s) Oral every 12 hours  pantoprazole    Tablet 40 milliGRAM(s) Oral before breakfast  povidone iodine 10% Solution 1 Application(s) Topical daily  sodium chloride 0.9%. 1000 milliLiter(s) (50 mL/Hr) IV Continuous <Continuous>    MEDICATIONS  (PRN):  aluminum hydroxide/magnesium hydroxide/simethicone Suspension 30 milliLiter(s) Oral every 4 hours PRN Dyspepsia  dextrose Oral Gel 15 Gram(s) Oral once PRN Blood Glucose LESS THAN 70 milliGRAM(s)/deciliter  diphenhydrAMINE Injectable 25 milliGRAM(s) IV Push <User Schedule> PRN Combative behavior  HYDROmorphone  Injectable 1 milliGRAM(s) IV Push every 4 hours PRN Severe Pain (7 - 10)  HYDROmorphone  Injectable 0.5 milliGRAM(s) IV Push every 4 hours PRN Moderate Pain (4 - 6)  melatonin 3 milliGRAM(s) Oral at bedtime PRN Insomnia  ondansetron Injectable 4 milliGRAM(s) IV Push every 8 hours PRN Nausea and/or Vomiting      Allergies    latex (Unknown)  No Known Drug Allergies    Intolerances        REVIEW OF SYSTEMS:  CONSTITUTIONAL: No fever or chills  HEENT:  No headache, no sore throat  RESPIRATORY: No cough, wheezing, or shortness of breath  CARDIOVASCULAR: No chest pain, palpitations, or leg swelling  GASTROINTESTINAL: No abd pain, nausea, vomiting, or diarrhea  GENITOURINARY: No dysuria, frequency, or hematuria  NEUROLOGICAL: no focal weakness or dizziness  MUSCULOSKELETAL: no myalgias     Vital Signs Last 24 Hrs  T(C): 36.4 (22 Jun 2022 13:35), Max: 36.4 (21 Jun 2022 17:45)  T(F): 97.5 (22 Jun 2022 13:35), Max: 97.5 (21 Jun 2022 17:45)  HR: 61 (22 Jun 2022 14:00) (57 - 68)  BP: 130/64 (22 Jun 2022 14:00) (99/50 - 174/70)  BP(mean): 92 (22 Jun 2022 14:00) (71 - 101)  RR: 25 (22 Jun 2022 14:00) (11 - 25)  SpO2: 100% (22 Jun 2022 14:00) (92% - 100%)    PHYSICAL EXAM:  GENERAL: NAD  HEENT:  EOMI, moist mucous membranes  CHEST/LUNG:  CTA b/l, no rales, wheezes, or rhonchi  HEART:  RRR, S1, S2  ABDOMEN:  BS+, soft, nontender, nondistended  EXTREMITIES: no edema, cyanosis, or calf tenderness, Lt great toe dry gangrene.  NERVOUS SYSTEM: AA&Ox3    LABS:                        10.8   13.25 )-----------( 339      ( 22 Jun 2022 05:25 )             35.3     CBC Full  -  ( 22 Jun 2022 05:25 )  WBC Count : 13.25 K/uL  Hemoglobin : 10.8 g/dL  Hematocrit : 35.3 %  Platelet Count - Automated : 339 K/uL  Mean Cell Volume : 93.1 fl  Mean Cell Hemoglobin : 28.5 pg  Mean Cell Hemoglobin Concentration : 30.6 gm/dL  Auto Neutrophil # : 12.59 K/uL  Auto Lymphocyte # : 0.40 K/uL  Auto Monocyte # : 0.13 K/uL  Auto Eosinophil # : 0.00 K/uL  Auto Basophil # : 0.13 K/uL  Auto Neutrophil % : 94.0 %  Auto Lymphocyte % : 3.0 %  Auto Monocyte % : 1.0 %  Auto Eosinophil % : 0.0 %  Auto Basophil % : 1.0 %    22 Jun 2022 05:25    136    |  99     |  34     ----------------------------<  208    5.0     |  25     |  4.30     Ca    8.0        22 Jun 2022 05:25  Phos  5.7       22 Jun 2022 05:25  Mg     2.2       22 Jun 2022 05:25    TPro  5.7    /  Alb  2.3    /  TBili  0.3    /  DBili  x      /  AST  14     /  ALT  12     /  AlkPhos  88     22 Jun 2022 05:25        CAPILLARY BLOOD GLUCOSE      POCT Blood Glucose.: 154 mg/dL (22 Jun 2022 11:27)  POCT Blood Glucose.: 207 mg/dL (22 Jun 2022 07:59)  POCT Blood Glucose.: 133 mg/dL (21 Jun 2022 22:07)  POCT Blood Glucose.: 142 mg/dL (21 Jun 2022 18:00)        Culture - Urine (collected 06-16-22 @ 03:40)  Source: Clean Catch Clean Catch (Midstream)  Final Report (06-17-22 @ 07:32):    <10,000 CFU/mL Normal Urogenital Shantel    Culture - Blood (collected 06-16-22 @ 03:38)  Source: .Blood Blood-Peripheral  Final Report (06-21-22 @ 04:00):    No Growth Final    Culture - Blood (collected 06-16-22 @ 03:38)  Source: .Blood Blood-Peripheral  Final Report (06-21-22 @ 04:00):    No Growth Final        RADIOLOGY & ADDITIONAL TESTS:    Personally reviewed.     Consultant(s) Notes Reviewed:  [x] YES  [ ] NO    Care Discussed with [x] Consultants  [x] Patient  [ ] Family  [ ]      [ ] Other; RN  DVT ppx

## 2022-06-22 NOTE — CONSULT NOTE ADULT - CONSULT REQUESTED DATE/TIME
16-Jun-2022 13:46
16-Jun-2022 11:46
22-Jun-2022 15:51
16-Jun-2022 18:08
21-Jun-2022 20:12
15-Justin-2022
16-Jun-2022 11:07
16-Jun-2022 14:12
17-Jun-2022 11:45

## 2022-06-22 NOTE — DISCHARGE NOTE PROVIDER - DETAILS OF MALNUTRITION DIAGNOSIS/DIAGNOSES
This patient has been assessed with a concern for Malnutrition and was treated during this hospitalization for the following Nutrition diagnosis/diagnoses:     -  06/22/2022: Severe protein-calorie malnutrition   -  06/22/2022: Underweight (BMI < 19)

## 2022-06-22 NOTE — DIETITIAN INITIAL EVALUATION ADULT - NS FNS DIET ORDER
Diet, Renal Restrictions:   For patients receiving Renal Replacement - No Protein Restr, No Conc K, No Conc Phos, Low Sodium  Consistent Carbohydrate {No Snacks}  DASH/TLC {Sodium & Cholesterol Restricted} (06-22-22 @ 09:33)  Diet, NPO after Midnight:      NPO Start Date: 22-Jun-2022,   NPO Start Time: 23:59  Except Medications (06-22-22 @ 09:23)

## 2022-06-22 NOTE — DISCHARGE NOTE PROVIDER - NSDCMRMEDTOKEN_GEN_ALL_CORE_FT
amLODIPine 5 mg oral tablet: 1 tab(s) orally once a day  aspirin 81 mg oral delayed release tablet: 1 tab(s) orally once a day OTC   atorvastatin 40 mg oral tablet: 1 tab(s) orally once a day (at bedtime)  home/hosp  calcium acetate 667 mg oral tablet: 1 tab(s) orally 3 times a day  cloNIDine 0.1 mg oral tablet: 1 tab(s) orally 2 times a day  clopidogrel 75 mg oral tablet: 1 tab(s) orally once a day  home/hosp  dilTIAZem 60 mg oral tablet: 1 tab(s) orally every 8 hours ,HOLD FOR SBP EBLOW 100 OR HR BELOW 60  imipramine 50 mg oral tablet: 1 tab(s) orally once a day  home/hosp  insulin glargine 100 units/mL subcutaneous solution: 12 unit(s) subcutaneous once a day (at bedtime)  insulin lispro (concentrated) 200 units/mL subcutaneous solution: subcutaneous 3 times a day (before meals) SLIDING SCALE   metoprolol tartrate 100 mg oral tablet: 1 tab(s) orally every 12 hours ,HOLD FOR SBP BELOW 100 OR HR BELOW 60  Protonix 40 mg oral delayed release tablet: 1 tab(s) orally once a day  hosp   amLODIPine 5 mg oral tablet: 1 tab(s) orally once a day  aspirin 81 mg oral delayed release tablet: 1 tab(s) orally once a day OTC   atorvastatin 40 mg oral tablet: 1 tab(s) orally once a day (at bedtime)  home/hosp  calcium acetate 667 mg oral tablet: 1 tab(s) orally 3 times a day  cloNIDine 0.1 mg oral tablet: 1 tab(s) orally 2 times a day  clopidogrel 75 mg oral tablet: 1 tab(s) orally once a day  home/hosp  dilTIAZem 60 mg oral tablet: 1 tab(s) orally every 8 hours ,HOLD FOR SBP EBLOW 100 OR HR BELOW 60  glucometer (per patient&#x27;s insurance): Test blood sugars four times a day. Dispense #1 glucometer.  imipramine 50 mg oral tablet: 1 tab(s) orally once a day  home/hosp  insulin glargine 100 units/mL subcutaneous solution: 12 unit(s) subcutaneous once a day (at bedtime)  insulin lispro (concentrated) 200 units/mL subcutaneous solution: subcutaneous 3 times a day (before meals) SLIDING SCALE   lancets: 1 application subcutaneously 4 times a day   metoprolol tartrate 100 mg oral tablet: 1 tab(s) orally every 12 hours ,HOLD FOR SBP BELOW 100 OR HR BELOW 60  Protonix 40 mg oral delayed release tablet: 1 tab(s) orally once a day  hosp  test strips (per patient&#x27;s insurance): 1 application subcutaneously 4 times a day. ** Compatible with patient&#x27;s glucometer **   aspirin 81 mg oral delayed release tablet: 1 tab(s) orally once a day  atorvastatin 40 mg oral tablet: 1 tab(s) orally once a day (at bedtime)  calcium acetate 667 mg oral tablet: 1 tab(s) orally 3 times a day  cloNIDine 0.1 mg oral tablet: 1 tab(s) orally 2 times a day  clopidogrel 75 mg oral tablet: 1 tab(s) orally once a day  imipramine 50 mg oral tablet: 1 tab(s) orally once a day  insulin glargine 100 units/mL subcutaneous solution: 12 unit(s) subcutaneous once a day (at bedtime)  insulin lispro (concentrated) 200 units/mL subcutaneous solution: Sliding Scale  2 Unit(s) if Glucose 151 - 200  4 Unit(s) if Glucose 201 - 250  6 Unit(s) if Glucose 251 - 300  8 Unit(s) if Glucose 301 - 350  10 Unit(s) if Glucose 351 - 400  12 Unit(s) if Glucose Greater Than 400  subcutaneous 4 times a day (before meals and at bedtime)  melatonin 3 mg oral tablet: 1 tab(s) orally once a day (at bedtime), As needed, Insomnia  metoprolol tartrate 100 mg oral tablet: 1 tab(s) orally every 12 hours  Nephro-Alfie oral tablet: 1 tab(s) orally once a day  OLANZapine 10 mg intramuscular injection: 2.5 milligram(s) intramuscular every 6 hours, As needed, Acute agitation  pantoprazole 40 mg oral delayed release tablet: 1 tab(s) orally once a day (before a meal)  risperiDONE 0.5 mg oral tablet: 1 tab(s) orally 2 times a day  zinc sulfate 220 mg oral capsule: 1 cap(s) orally once a day   aspirin 81 mg oral delayed release tablet: 1 tab(s) orally once a day  atorvastatin 40 mg oral tablet: 1 tab(s) orally once a day (at bedtime)  calcium acetate 667 mg oral tablet: 1 tab(s) orally 3 times a day  cloNIDine 0.1 mg oral tablet: 1 tab(s) orally 2 times a day  clopidogrel 75 mg oral tablet: 1 tab(s) orally once a day  imipramine 50 mg oral tablet: 1 tab(s) orally once a day  insulin glargine 100 units/mL subcutaneous solution: 12 unit(s) subcutaneous once a day (at bedtime)  insulin lispro (concentrated) 200 units/mL subcutaneous solution: Sliding Scale  2 Unit(s) if Glucose 151 - 200  4 Unit(s) if Glucose 201 - 250  6 Unit(s) if Glucose 251 - 300  8 Unit(s) if Glucose 301 - 350  10 Unit(s) if Glucose 351 - 400  12 Unit(s) if Glucose Greater Than 400  subcutaneous 4 times a day (before meals and at bedtime)  melatonin 3 mg oral tablet: 1 tab(s) orally once a day (at bedtime), As needed, Insomnia  metoprolol tartrate 100 mg oral tablet: 1 tab(s) orally every 12 hours  Nephro-Alfie oral tablet: 1 tab(s) orally once a day  pantoprazole 40 mg oral delayed release tablet: 1 tab(s) orally once a day (before a meal)  risperiDONE 0.5 mg oral tablet: 1 tab(s) orally 2 times a day  zinc sulfate 220 mg oral capsule: 1 cap(s) orally once a day

## 2022-06-22 NOTE — CONSULT NOTE ADULT - CONSULT REQUESTED BY NAME
Dr. Finney
Renetta Barker
[facility]
Medicine
OR
esrd on hd
Dr. Barker
[place]
Dr James

## 2022-06-22 NOTE — CONSULT NOTE ADULT - SUBJECTIVE AND OBJECTIVE BOX
Patient is a 71y old  Female who presents with a chief complaint of SIRS (22 Jun 2022 15:06)    Type:2 DX >10 years. a1c 9% states bc difficulty taking care of herself- usually  does however recently had a stroke in rehab. home Rx lantus 14 units HS (corrected herself prior state Tresiba) difficulty remembering if she takes humalog AC. Aware hypoglycemia prior to admit but does not recall last dose of insulin prior to hypoglycemia. States appetite has been poor. living alone since  in rehab- step son comes from [place] to help when he can. endo: Dr Kirk- last seen 6 mos ago. a1c then was 7%.  diabetes education provided/declined handout. States needs meter- will order.       HPI:  Patient is a 71 year old female with PMH of A.fib rate controlled not on AC for hx of multiple falls, T2DM, HTN, HLD, ESRD on HD, CHF, s/p CABG brought in by EMS for generalized weakness at home. Patient states that she was doing well when in the afternoon she tried to get up from her couch and felt weak in the LE and fell back in her couch. Denies any hx of head trauma or loss of consciousness. Denies any weakness or numbness. Denies any chest pain, SOB or palpitations. No fever, cough or chills. No hx of recent travel or sick contacts. At the time of EMS arrival patient was found to have POCBS of 50. EMS gave dextrose and on arrival to the ED patient blood sugar was found to be in 30's with hypothermia of 94.9.    ED course:   Vitals:   BP: 176/85  HR:76  Temp:94.2  RR:18, O2:96% on RA   Significant Labs:   CBC: WBC:16.82 Hb:13.2 , Platlets: 260, Neutro:89   CMP: Lytes: WNL, BUN/Creatinine:48/4.8, Glucose:134 , Alkaline Phos:128, AST, 41, eGFR: 9, HsTrop: 275.9, ProBNP: 720418, Lactate: 2  UA; negative  CXR: Heart and lung appear normal (self read)  CT BRAIN: No acute intracranial bleeding. Central volume loss, chronic microvascular ischemic changes, and chronic bilateral lacunar infarctions.  CT CERVICAL SPINE: No fracture. Grade 1 C4-C5 anterolisthesis. C6-C7 disc degeneration. Bilateral pleural effusions and interlobular septal thickening due to   interstitial edema.  EKG: NSR, left axis deviation, HR 71,   QT/QTc: 426/462  Patient received: Ceftriaxone 1 g x1, Dextrose 5% + NS 1L x1, Dextrose 50% IV X1, heating blanket, 2L NC   (16 Jun 2022 01:19)      PAST MEDICAL & SURGICAL HISTORY:  Diabetes mellitus II      HTN (hypertension)      h/o Anxiety attack      Depression      h/o Myocardial infarct 2007      CAD (coronary artery disease)      h/o Hepatitis A 1969  currently resolved, no symptoms      PAD (peripheral artery disease)      Murmur, cardiac      h/o Smoking  quitted 3/2012      CRF (chronic renal failure), unspecified stage      Dialysis patient      Anemia secondary to renal failure      HTN (hypertension)      coronary stent 2007      s/p Ovarian cyst removal      s/p surgical removal of benign Skin lesion epigastric area          REVIEW OF SYSTEMS  General:	as above  Respiratory: NAD, No SOB, no cough  Cardiovascular: No chest pain, no palpitations	  Endocrine: no polyuria, no polydipsia, or S/S of hypoglycemia        Allergies    latex (Unknown)  No Known Drug Allergies    Intolerances        MEDICATIONS  (STANDING):  acetaminophen     Tablet .. 1000 milliGRAM(s) Oral every 6 hours  amLODIPine   Tablet 5 milliGRAM(s) Oral daily  aspirin enteric coated 81 milliGRAM(s) Oral daily  atorvastatin 40 milliGRAM(s) Oral at bedtime  calcium acetate 667 milliGRAM(s) Oral three times a day with meals  cefTRIAXone   IVPB 1000 milliGRAM(s) IV Intermittent every 24 hours  chlorhexidine 4% Liquid 1 Application(s) Topical <User Schedule>  cloNIDine 0.1 milliGRAM(s) Oral two times a day  clopidogrel Tablet 75 milliGRAM(s) Oral daily  dextrose 5%. 1000 milliLiter(s) (50 mL/Hr) IV Continuous <Continuous>  dextrose 5%. 1000 milliLiter(s) (100 mL/Hr) IV Continuous <Continuous>  dextrose 50% Injectable 25 Gram(s) IV Push once  dextrose 50% Injectable 12.5 Gram(s) IV Push once  dextrose 50% Injectable 25 Gram(s) IV Push once  diltiazem    Tablet 60 milliGRAM(s) Oral every 8 hours  glucagon  Injectable 1 milliGRAM(s) IntraMuscular once  heparin   Injectable 5000 Unit(s) SubCutaneous every 12 hours  imipramine 50 milliGRAM(s) Oral daily  insulin glargine Injectable (LANTUS) 8 Unit(s) SubCutaneous at bedtime  insulin lispro (ADMELOG) corrective regimen sliding scale   SubCutaneous three times a day before meals  insulin lispro (ADMELOG) corrective regimen sliding scale   SubCutaneous at bedtime  metoprolol tartrate 100 milliGRAM(s) Oral every 12 hours  pantoprazole    Tablet 40 milliGRAM(s) Oral before breakfast  povidone iodine 10% Solution 1 Application(s) Topical daily  sodium chloride 0.9%. 1000 milliLiter(s) (50 mL/Hr) IV Continuous <Continuous>

## 2022-06-22 NOTE — DISCHARGE NOTE PROVIDER - NSDCCPCAREPLAN_GEN_ALL_CORE_FT
PRINCIPAL DISCHARGE DIAGNOSIS  Diagnosis: Gangrene of toe of right foot  Assessment and Plan of Treatment: You had a R fem-popliteal bypass and partial ray amputation. You need to complete course of antibiotics. You need Vancomycin 1gm after hemodialysis until 7/26/22, adjust to vanco trough to 10-15.      SECONDARY DISCHARGE DIAGNOSES  Diagnosis: Chronic CHF  Assessment and Plan of Treatment: Continue medications as prescribed, follow up with Cardiology within 2 weeks of discharge    Diagnosis: ESRD on hemodialysis  Assessment and Plan of Treatment: Continue dialysis as scheduled  Follow up with nephrology upon discharge    Diagnosis: Atrial fibrillation  Assessment and Plan of Treatment: Continue medications as prescribed. You are not on anticoagulation due to your history of falls. Please follow up with Cardiology within 2 weeks of discharge.

## 2022-06-22 NOTE — DISCHARGE NOTE PROVIDER - CARE PROVIDER_API CALL
Joseluis James)  Internal Medicine  [address], [address]  [place] [address]  Phone: [phone]  Fax: [phone]  Follow Up Time: 2 weeks    Pahlavan, Mohsen (MD)  Medicine  [address], [address]  [place] [address]  Phone: [phone]  Fax: [phone]  Follow Up Time: 2 weeks    Genoveva Rodas)  Neurology  [address] [place] [address]  Phone: [phone]  Fax: [phone]  Follow Up Time: Routine

## 2022-06-22 NOTE — PROGRESS NOTE ADULT - NS ATTEND AMEND GEN_ALL_CORE FT
Patient evaluated at the bedside. RLE is well perfused. Doppler AT PT signals. R inner thigh is soft. Cont Plavix and ASA for now. For toe amp this week. OOB and ambulate. Keep pravena on. Transfer to regular floor.

## 2022-06-22 NOTE — PROGRESS NOTE ADULT - SUBJECTIVE AND OBJECTIVE BOX
CAPILLARY BLOOD GLUCOSE      POCT Blood Glucose.: 207 mg/dL (22 Jun 2022 07:59)  POCT Blood Glucose.: 133 mg/dL (21 Jun 2022 22:07)  POCT Blood Glucose.: 142 mg/dL (21 Jun 2022 18:00)  POCT Blood Glucose.: 132 mg/dL (21 Jun 2022 11:44)      Vital Signs Last 24 Hrs  T(C): 36.3 (22 Jun 2022 08:16), Max: 36.7 (21 Jun 2022 10:35)  T(F): 97.4 (22 Jun 2022 08:16), Max: 98.1 (21 Jun 2022 10:35)  HR: 59 (22 Jun 2022 07:00) (55 - 68)  BP: 109/55 (22 Jun 2022 07:00) (107/51 - 174/70)  BP(mean): 79 (22 Jun 2022 07:00) (79 - 101)  RR: 20 (22 Jun 2022 07:00) (11 - 22)  SpO2: 100% (22 Jun 2022 07:00) (92% - 100%)    General: WN/WD NAD  Respiratory: CTA B/L  CV: RRR, S1S2, no murmurs, rubs or gallops  Abdominal: Soft, NT, ND +BS, Last BM  Extremities: No edema, + peripheral pulses     06-22    136  |  99  |  34<H>  ----------------------------<  208<H>  5.0   |  25  |  4.30<H>    Ca    8.0<L>      22 Jun 2022 05:25  Phos  5.7     06-22  Mg     2.2     06-22    TPro  5.7<L>  /  Alb  2.3<L>  /  TBili  0.3  /  DBili  x   /  AST  14<L>  /  ALT  12  /  AlkPhos  88  06-22      atorvastatin 40 milliGRAM(s) Oral at bedtime  dextrose 50% Injectable 12.5 Gram(s) IV Push once  dextrose 50% Injectable 25 Gram(s) IV Push once  dextrose 50% Injectable 25 Gram(s) IV Push once  dextrose Oral Gel 15 Gram(s) Oral once PRN  glucagon  Injectable 1 milliGRAM(s) IntraMuscular once  insulin glargine Injectable (LANTUS) 8 Unit(s) SubCutaneous at bedtime  insulin lispro (ADMELOG) corrective regimen sliding scale   SubCutaneous three times a day before meals  insulin lispro (ADMELOG) corrective regimen sliding scale   SubCutaneous at bedtime

## 2022-06-22 NOTE — PROGRESS NOTE ADULT - SUBJECTIVE AND OBJECTIVE BOX
Patient is a 71y Female with a known history of :  SIRS (systemic inflammatory response syndrome) [R65.10]    Hypoglycemia [E16.2]    Pleural effusion [J90]    CHF, acute [I50.9]    Chronic CHF [I50.9]    Elevated troponin [R77.8]    Atrial fibrillation [I48.91]    Benign essential HTN [I10]    HLD (hyperlipidemia) [E78.5]    Depression, major [F32.9]    Need for prophylactic measure [Z29.9]    ESRD on hemodialysis [N18.6]    T2DM (type 2 diabetes mellitus) [E11.9]    HFrEF (heart failure with reduced ejection fraction) [I50.20]    Gangrene of toe of right foot [I96]    Atherosclerotic PVD with ulceration [I70.209]      HPI:  Patient is a 71 year old female with PMH of A.fib rate controlled not on AC for hx of multiple falls, T2DM, HTN, HLD, ESRD on HD, CHF, s/p CABG brought in by EMS for generalized weakness at home. Patient states that she was doing well when in the afternoon she tried to get up from her couch and felt weak in the LE and fell back in her couch. Denies any hx of head trauma or loss of consciousness. Denies any weakness or numbness. Denies any chest pain, SOB or palpitations. No fever, cough or chills. No hx of recent travel or sick contacts. At the time of EMS arrival patient was found to have POCBS of 50. EMS gave dextrose and on arrival to the ED patient blood sugar was found to be in 30's with hypothermia of 94.9.    ED course:   Vitals:   BP: 176/85  HR:76  Temp:94.2  RR:18, O2:96% on RA   Significant Labs:   CBC: WBC:16.82 Hb:13.2 , Platlets: 260, Neutro:89   CMP: Lytes: WNL, BUN/Creatinine:48/4.8, Glucose:134 , Alkaline Phos:128, AST, 41, eGFR: 9, HsTrop: 275.9, ProBNP: 906262, Lactate: 2  UA; negative  CXR: Heart and lung appear normal (self read)  CT BRAIN: No acute intracranial bleeding. Central volume loss, chronic microvascular ischemic changes, and chronic bilateral lacunar infarctions.  CT CERVICAL SPINE: No fracture. Grade 1 C4-C5 anterolisthesis. C6-C7 disc degeneration. Bilateral pleural effusions and interlobular septal thickening due to   interstitial edema.  EKG: NSR, left axis deviation, HR 71,   QT/QTc: 426/462  Patient received: Ceftriaxone 1 g x1, Dextrose 5% + NS 1L x1, Dextrose 50% IV X1, heating blanket, 2L NC   (16 Jun 2022 01:19)      REVIEW OF SYSTEMS:    CONSTITUTIONAL: No fever, weight loss, or fatigue  EYES: No eye pain, visual disturbances, or discharge  ENMT:  No difficulty hearing, tinnitus, vertigo; No sinus or throat pain  NECK: No pain or stiffness  BREASTS: No pain, masses, or nipple discharge  RESPIRATORY: No cough, wheezing, chills or hemoptysis; No shortness of breath  CARDIOVASCULAR: No chest pain, palpitations, dizziness, or leg swelling  GASTROINTESTINAL: No abdominal or epigastric pain. No nausea, vomiting, or hematemesis; No diarrhea or constipation. No melena or hematochezia.  GENITOURINARY: No dysuria, frequency, hematuria, or incontinence  NEUROLOGICAL: No headaches, memory loss, loss of strength, numbness, or tremors  SKIN: No itching, burning, rashes, or lesions   LYMPH NODES: No enlarged glands  ENDOCRINE: No heat or cold intolerance; No hair loss  MUSCULOSKELETAL: No joint pain or swelling; No muscle, back, or extremity pain  PSYCHIATRIC: No depression, anxiety, mood swings, or difficulty sleeping  HEME/LYMPH: No easy bruising, or bleeding gums  ALLERGY AND IMMUNOLOGIC: No hives or eczema    MEDICATIONS  (STANDING):  acetaminophen     Tablet .. 1000 milliGRAM(s) Oral every 6 hours  amLODIPine   Tablet 5 milliGRAM(s) Oral daily  aspirin enteric coated 81 milliGRAM(s) Oral daily  atorvastatin 40 milliGRAM(s) Oral at bedtime  calcium acetate 667 milliGRAM(s) Oral three times a day with meals  cefTRIAXone   IVPB 1000 milliGRAM(s) IV Intermittent every 24 hours  chlorhexidine 4% Liquid 1 Application(s) Topical <User Schedule>  cloNIDine 0.1 milliGRAM(s) Oral two times a day  clopidogrel Tablet 75 milliGRAM(s) Oral daily  dextrose 5%. 1000 milliLiter(s) (50 mL/Hr) IV Continuous <Continuous>  dextrose 5%. 1000 milliLiter(s) (100 mL/Hr) IV Continuous <Continuous>  dextrose 50% Injectable 25 Gram(s) IV Push once  dextrose 50% Injectable 12.5 Gram(s) IV Push once  dextrose 50% Injectable 25 Gram(s) IV Push once  diltiazem    Tablet 60 milliGRAM(s) Oral every 8 hours  glucagon  Injectable 1 milliGRAM(s) IntraMuscular once  heparin   Injectable 5000 Unit(s) SubCutaneous every 12 hours  imipramine 50 milliGRAM(s) Oral daily  insulin glargine Injectable (LANTUS) 8 Unit(s) SubCutaneous at bedtime  insulin lispro (ADMELOG) corrective regimen sliding scale   SubCutaneous three times a day before meals  insulin lispro (ADMELOG) corrective regimen sliding scale   SubCutaneous at bedtime  metoprolol tartrate 100 milliGRAM(s) Oral every 12 hours  pantoprazole    Tablet 40 milliGRAM(s) Oral before breakfast  povidone iodine 10% Solution 1 Application(s) Topical daily  sodium chloride 0.9%. 1000 milliLiter(s) (50 mL/Hr) IV Continuous <Continuous>    MEDICATIONS  (PRN):  aluminum hydroxide/magnesium hydroxide/simethicone Suspension 30 milliLiter(s) Oral every 4 hours PRN Dyspepsia  dextrose Oral Gel 15 Gram(s) Oral once PRN Blood Glucose LESS THAN 70 milliGRAM(s)/deciliter  diphenhydrAMINE Injectable 25 milliGRAM(s) IV Push <User Schedule> PRN Combative behavior  HYDROmorphone  Injectable 1 milliGRAM(s) IV Push every 4 hours PRN Severe Pain (7 - 10)  HYDROmorphone  Injectable 0.5 milliGRAM(s) IV Push every 4 hours PRN Moderate Pain (4 - 6)  melatonin 3 milliGRAM(s) Oral at bedtime PRN Insomnia  ondansetron Injectable 4 milliGRAM(s) IV Push every 8 hours PRN Nausea and/or Vomiting      ALLERGIES: latex (Unknown)  No Known Drug Allergies      FAMILY HISTORY:  No pertinent family history in first degree relatives        PHYSICAL EXAMINATION:  -----------------------------  T(C): 36.3 (06-22-22 @ 08:16), Max: 36.7 (06-21-22 @ 10:35)  HR: 59 (06-22-22 @ 07:00) (55 - 68)  BP: 109/55 (06-22-22 @ 07:00) (107/51 - 174/70)  RR: 20 (06-22-22 @ 07:00) (11 - 22)  SpO2: 100% (06-22-22 @ 07:00) (92% - 100%)  Wt(kg): --    06-21 @ 07:01  -  06-22 @ 07:00  --------------------------------------------------------  IN:    Oral Fluid: 120 mL  Total IN: 120 mL    OUT:  Total OUT: 0 mL    Total NET: 120 mL        Height (cm): 175.3 (06-21 @ 20:45)  Weight (kg): 54 (06-21 @ 20:45)  BMI (kg/m2): 17.6 (06-21 @ 20:45)  BSA (m2): 1.66 (06-21 @ 20:45)    VITALS  T(C): 36.3 (06-22-22 @ 08:16), Max: 36.7 (06-21-22 @ 10:35)  HR: 59 (06-22-22 @ 07:00) (55 - 68)  BP: 109/55 (06-22-22 @ 07:00) (107/51 - 174/70)  RR: 20 (06-22-22 @ 07:00) (11 - 22)  SpO2: 100% (06-22-22 @ 07:00) (92% - 100%)    Constitutional: well developed, normal appearance, well groomed, well nourished, no deformities and no acute distress.   Eyes: the conjunctiva exhibited no abnormalities and the eyelids demonstrated no xanthelasmas.   HEENT: normal oral mucosa, no oral pallor and no oral cyanosis.   Neck: normal jugular venous A waves present, normal jugular venous V waves present and no jugular venous wilson A waves.   Pulmonary: no respiratory distress, normal respiratory rhythm and effort, no accessory muscle use and lungs were clear to auscultation bilaterally.   Cardiovascular: heart rate and rhythm were normal, normal S1 and S2 and no murmur, gallop, rub, heave or thrill are present.   Abdomen: soft, non-tender, no hepato-splenomegaly and no abdominal mass palpated.   Musculoskeletal: the gait could not be assessed..   Extremities: no clubbing of the fingernails, no localized cyanosis, no petechial hemorrhages and no ischemic changes.   Skin: normal skin color and pigmentation, no rash, no venous stasis, no skin lesions, no skin ulcer and no xanthoma was observed.   Psychiatric: oriented to person, place, and time, the affect was normal, the mood was normal and not feeling anxious.     LABS:   --------  06-22    136  |  99  |  34<H>  ----------------------------<  208<H>  5.0   |  25  |  4.30<H>    Ca    8.0<L>      22 Jun 2022 05:25  Phos  5.7     06-22  Mg     2.2     06-22    TPro  5.7<L>  /  Alb  2.3<L>  /  TBili  0.3  /  DBili  x   /  AST  14<L>  /  ALT  12  /  AlkPhos  88  06-22                         10.8   13.25 )-----------( 339      ( 22 Jun 2022 05:25 )             35.3     PT/INR - ( 20 Jun 2022 12:36 )   PT: 11.4 sec;   INR: 0.97 ratio         PTT - ( 20 Jun 2022 12:36 )  PTT:29.8 sec            RADIOLOGY:  -----------------    ECG:     ECHO:

## 2022-06-22 NOTE — PROGRESS NOTE ADULT - ASSESSMENT
pt with hypoglycemia  elevated troponin due to renal failure  ashd , s/p mi  s/p coronary stent  s/p cabg  [id number]-hfref  esrd - on hd  dm2  hypertension  paf -not on oac - fall risk  pvd - s/p stent  dyslipidemia   chf - compensated - recent coronary angiogram - patent graft -pt's cradiac status stable low  to intermediate risk for  pvd surgery and amputation of great  toe if needed  6/21  pt tolerated rt femoral -poplitael by-pass 6/21  cardiac status stable low risk for rt big toe amputation 6/22

## 2022-06-22 NOTE — CONSULT NOTE ADULT - CONSULT REASON
71y A1C with Estimated Average Glucose Result: 9.0 % (06-17-22 @ 10:44)  A1C with Estimated Average Glucose Result: 8.1 % (04-23-22 @ 13:17)  A1C with Estimated Average Glucose Result: 8.3 % (04-06-22 @ 09:45)   diabetes mellitus uncontrolled type 2
Post OP, q1hNV checks
cardiac evluation
SIRS
R great toe gangrene
esrd on hd
Right hallux infection
Wound Consult
dm2 uncontrolled

## 2022-06-22 NOTE — PROGRESS NOTE ADULT - SUBJECTIVE AND OBJECTIVE BOX
Interval Events:   Patient seen and examined at bedside. Patient laying in bed, appears comfortable and in NAD. Patient hemodynamically stable S/P R Fem Pop bypass POD 1. Patient complains of R leg pain. Denies any CP, SOB or nausea, vomiting, or diarrhea.     Review of Systems:  Constitutional: No fever, chills, fatigue  Neuro: No headache, numbness, weakness  Resp: No cough, wheezing, shortness of breath  CVS: No chest pain, palpitations, leg swelling  GI: No abdominal pain, nausea, vomiting, diarrhea   : No dysuria, frequency, incontinence  Skin: No itching, burning, rashes, or lesions   Msk: + R leg pain   Psych: No depression, anxiety, mood swings    ICU Vital Signs Last 24 Hrs  T(C): 36.4 (22 Jun 2022 10:05), Max: 36.4 (21 Jun 2022 17:45)  T(F): 97.5 (22 Jun 2022 10:05), Max: 97.5 (21 Jun 2022 17:45)  HR: 61 (22 Jun 2022 14:00) (57 - 68)  BP: 130/64 (22 Jun 2022 14:00) (99/50 - 174/70)  BP(mean): 92 (22 Jun 2022 14:00) (71 - 101)  ABP: --  ABP(mean): --  RR: 25 (22 Jun 2022 14:00) (11 - 25)  SpO2: 100% (22 Jun 2022 14:00) (92% - 100%)        06-21-22 @ 07:01  -  06-22-22 @ 07:00  --------------------------------------------------------  IN: 120 mL / OUT: 0 mL / NET: 120 mL        CAPILLARY BLOOD GLUCOSE      POCT Blood Glucose.: 154 mg/dL (22 Jun 2022 11:27)      I&O's Summary    21 Jun 2022 07:01  -  22 Jun 2022 07:00  --------------------------------------------------------  IN: 120 mL / OUT: 0 mL / NET: 120 mL        Physical Exam:   Gen: Elderly female, NAD, hemodynamically stable   Neuro: Awake and alert   HEENT: Normocephalic and atraumatic   Resp: CTA b/l   CVS: S1, S2, no rubs, gallops or murmurs   Abd: Soft and non-tender   Ext: R wound vac, R hallux gangrenous     Meds:  cefTRIAXone   IVPB IV Intermittent    amLODIPine   Tablet Oral  cloNIDine Oral  diltiazem    Tablet Oral  metoprolol tartrate Oral    atorvastatin Oral  dextrose 50% Injectable IV Push  dextrose 50% Injectable IV Push  dextrose 50% Injectable IV Push  dextrose Oral Gel Oral PRN  glucagon  Injectable IntraMuscular  insulin glargine Injectable (LANTUS) SubCutaneous  insulin lispro (ADMELOG) corrective regimen sliding scale SubCutaneous  insulin lispro (ADMELOG) corrective regimen sliding scale SubCutaneous    diphenhydrAMINE Injectable IV Push PRN    acetaminophen     Tablet .. Oral  HYDROmorphone  Injectable IV Push PRN  HYDROmorphone  Injectable IV Push PRN  imipramine Oral  melatonin Oral PRN  ondansetron Injectable IV Push PRN      aspirin enteric coated Oral  clopidogrel Tablet Oral  heparin   Injectable SubCutaneous    aluminum hydroxide/magnesium hydroxide/simethicone Suspension Oral PRN  pantoprazole    Tablet Oral      calcium acetate Oral  dextrose 5%. IV Continuous  dextrose 5%. IV Continuous  sodium chloride 0.9%. IV Continuous      chlorhexidine 4% Liquid Topical  povidone iodine 10% Solution Topical                              10.8   13.25 )-----------( 339      ( 22 Jun 2022 05:25 )             35.3     Bands 1.0    06-22    136  |  99  |  34<H>  ----------------------------<  208<H>  5.0   |  25  |  4.30<H>    Ca    8.0<L>      22 Jun 2022 05:25  Phos  5.7     06-22  Mg     2.2     06-22    TPro  5.7<L>  /  Alb  2.3<L>  /  TBili  0.3  /  DBili  x   /  AST  14<L>  /  ALT  12  /  AlkPhos  88  06-22                          Radiology: No new imaging     Bedside Ultrasound: N/A     Tubes/Lines: None       GLOBAL ISSUE/BEST PRACTICE:  Analgesia: Y   Sedation: N   HOB elevation: Y  Stress ulcer prophylaxis: Y   VTE prophylaxis: Y   Glycemic control: Y   Nutrition: Y     CODE STATUS: Full code

## 2022-06-22 NOTE — PROGRESS NOTE ADULT - ASSESSMENT
71 year old female with PMH of A.fib rate controlled not on AC for hx of multiple falls, T2DM, HTN, HLD, ESRD on HD, CHF, s/p CABG brought in by EMS for generalized weakness at home, hypothermia, hypoglycemia 2/2 sepsis from R hallux gangrene with superimposed infection and past CTa and TOMMY consistent with acute ischemic of R leg s/p fem pop bypass POD 1    Neuro: q1hr NV checks. Pain control    CV: Monitor HR and BP. C/w Cardizem, metoprolol, ASA, Plavix, Clonidine, Norvasc, and statin. HD stable   Pulm: No acute issues   Renal: ESRD on HD. Dialysis per nephro. Strict I/Os, goal UOP >0.5cc/kg/hr. Trend renal function and electrolytes, replete as needed. Avoid nephrotoxic agents  GI: PPI, on Renal restrictive diet   ID: Hx of L medial malleolus growing klebsiella oxytoca/raoutella oxytoca, E. coli, MSSA. Recent blood cultures negative. On Rocephin per ID. Amputation planned for 06/23/2022. Trend WBC/fevers/procalcitonin    Heme: HSQ for DVT prophylaxis. Monitor for signs and symptoms of hemorrhage, trend H&H    Endo: Goal blood glucose <180. C/w Lantus and ISS

## 2022-06-22 NOTE — DIETITIAN INITIAL EVALUATION ADULT - REASON FOR ADMISSION
70yo Female with PMH of HTN, ESRD, T2DM, Smoker, MI, Hep A. Pt admitted on 6/15 with Hypoglycemia. Now s/p femoral popliteal bypass with prosthetic graft; admitted to ICU.

## 2022-06-22 NOTE — DIETITIAN INITIAL EVALUATION ADULT - PERTINENT LABORATORY DATA
06-22    136  |  99  |  34<H>  ----------------------------<  208<H>  5.0   |  25  |  4.30<H>    Ca    8.0<L>      22 Jun 2022 05:25  Phos  5.7     06-22  Mg     2.2     06-22    TPro  5.7<L>  /  Alb  2.3<L>  /  TBili  0.3  /  DBili  x   /  AST  14<L>  /  ALT  12  /  AlkPhos  88  06-22  POCT Blood Glucose.: 154 mg/dL (06-22-22 @ 11:27)  A1C with Estimated Average Glucose Result: 9.0 % (06-17-22 @ 10:44)  A1C with Estimated Average Glucose Result: 8.1 % (04-23-22 @ 13:17)  A1C with Estimated Average Glucose Result: 8.3 % (04-06-22 @ 09:45)

## 2022-06-22 NOTE — PROGRESS NOTE ADULT - SUBJECTIVE AND OBJECTIVE BOX
Patient is a 71y old  Female who presents with a chief complaint of SIRS (21 Jun 2022 23:05)  Vascular surgery consulted for PAD with gangrene  Now S/P R fem-BK pop w/PTFE                 POD# 1  Pt c/o R foot pain and incisional pain managed with prescribed regimen. Denies numbness or weakness, CP or SOB    Vital Signs Last 24 Hrs  T(C): 36.1 (22 Jun 2022 04:14), Max: 36.7 (21 Jun 2022 10:35)  T(F): 97 (22 Jun 2022 04:14), Max: 98.1 (21 Jun 2022 10:35)  HR: 59 (22 Jun 2022 07:00) (55 - 68)  BP: 109/55 (22 Jun 2022 07:00) (107/51 - 174/70)  BP(mean): 79 (22 Jun 2022 07:00) (79 - 101)  RR: 20 (22 Jun 2022 07:00) (11 - 22)  SpO2: 100% (22 Jun 2022 07:00) (92% - 100%)  I&O's Detail    21 Jun 2022 07:01  -  22 Jun 2022 07:00  --------------------------------------------------------  IN:    Oral Fluid: 120 mL  Total IN: 120 mL    OUT:  Total OUT: 0 mL    Total NET: 120 mL    MEDICATIONS  (STANDING):  acetaminophen     Tablet .. 1000 milliGRAM(s) Oral every 6 hours  amLODIPine   Tablet 5 milliGRAM(s) Oral daily  aspirin enteric coated 81 milliGRAM(s) Oral daily  atorvastatin 40 milliGRAM(s) Oral at bedtime  calcium acetate 667 milliGRAM(s) Oral three times a day with meals  cefTRIAXone   IVPB 1000 milliGRAM(s) IV Intermittent every 24 hours  chlorhexidine 4% Liquid 1 Application(s) Topical <User Schedule>  cloNIDine 0.1 milliGRAM(s) Oral two times a day  clopidogrel Tablet 75 milliGRAM(s) Oral daily  dextrose 5%. 1000 milliLiter(s) (50 mL/Hr) IV Continuous <Continuous>  dextrose 5%. 1000 milliLiter(s) (100 mL/Hr) IV Continuous <Continuous>  dextrose 50% Injectable 12.5 Gram(s) IV Push once  dextrose 50% Injectable 25 Gram(s) IV Push once  dextrose 50% Injectable 25 Gram(s) IV Push once  diltiazem    Tablet 60 milliGRAM(s) Oral every 8 hours  glucagon  Injectable 1 milliGRAM(s) IntraMuscular once  heparin   Injectable 5000 Unit(s) SubCutaneous every 12 hours  imipramine 50 milliGRAM(s) Oral daily  insulin glargine Injectable (LANTUS) 8 Unit(s) SubCutaneous at bedtime  insulin lispro (ADMELOG) corrective regimen sliding scale   SubCutaneous three times a day before meals  insulin lispro (ADMELOG) corrective regimen sliding scale   SubCutaneous at bedtime  metoprolol tartrate 100 milliGRAM(s) Oral every 12 hours  pantoprazole    Tablet 40 milliGRAM(s) Oral before breakfast  povidone iodine 10% Solution 1 Application(s) Topical daily  sodium chloride 0.9%. 1000 milliLiter(s) (50 mL/Hr) IV Continuous <Continuous>    MEDICATIONS  (PRN):  aluminum hydroxide/magnesium hydroxide/simethicone Suspension 30 milliLiter(s) Oral every 4 hours PRN Dyspepsia  dextrose Oral Gel 15 Gram(s) Oral once PRN Blood Glucose LESS THAN 70 milliGRAM(s)/deciliter  diphenhydrAMINE Injectable 25 milliGRAM(s) IV Push <User Schedule> PRN Combative behavior  HYDROmorphone  Injectable 1 milliGRAM(s) IV Push every 4 hours PRN Severe Pain (7 - 10)  HYDROmorphone  Injectable 0.5 milliGRAM(s) IV Push every 4 hours PRN Moderate Pain (4 - 6)  melatonin 3 milliGRAM(s) Oral at bedtime PRN Insomnia  ondansetron Injectable 4 milliGRAM(s) IV Push every 8 hours PRN Nausea and/or Vomiting    PAST MEDICAL & SURGICAL HISTORY:  Diabetes mellitus II  HTN (hypertension)  h/o Anxiety attack  Depression  h/o Myocardial infarct 2007  CAD (coronary artery disease) coronary stent 2007  h/o Hepatitis A 1969  currently resolved, no symptoms  PAD (peripheral artery disease) S/P bifem bypass; L fem-BK pop bypass  Murmur, cardiac  h/o Smoking quitted 3/2012  CRF (chronic renal failure), unspecified stage  Dialysis patient  Anemia secondary to renal failure  s/p Ovarian cyst removal  s/p surgical removal of benign Skin lesion epigastric area        Physical Exam:  General: NAD, resting comfortably in bed  Pulmonary: Nonlabored breathing, no respiratory distress, diminished at bases  Cardiovascular: Normal S1, S2  Abdominal: soft, NT/ND  Extremities: RLE Prevena dressing in place with good seal. No edema. R great toe dry gangrene without erythema.  Pulses:   Right:                                                                            AT [ ]2+ [ ]1+ [x ]doppler                                                  DP [ ]2+ [ ]1+ [ ]doppler [x ]ABSENT                                                 PT[ ]2+ [ ]1+ [x ]doppler                                                    GRAFT Signal    LABS:                        10.8   13.25 )-----------( 339      ( 22 Jun 2022 05:25 )             35.3     06-22    136  |  99  |  34<H>  ----------------------------<  208<H>  5.0   |  25  |  4.30<H>    Ca    8.0<L>      22 Jun 2022 05:25  Phos  5.7     06-22  Mg     2.2     06-22    TPro  5.7<L>  /  Alb  2.3<L>  /  TBili  0.3  /  DBili  x   /  AST  14<L>  /  ALT  12  /  AlkPhos  88  06-22    PT/INR - ( 20 Jun 2022 12:36 )   PT: 11.4 sec;   INR: 0.97 ratio    PTT - ( 20 Jun 2022 12:36 )  PTT:29.8 sec    CAPILLARY BLOOD GLUCOSE  POCT Blood Glucose.: 207 mg/dL (22 Jun 2022 07:59)  POCT Blood Glucose.: 133 mg/dL (21 Jun 2022 22:07)  POCT Blood Glucose.: 142 mg/dL (21 Jun 2022 18:00)  POCT Blood Glucose.: 132 mg/dL (21 Jun 2022 11:44)

## 2022-06-22 NOTE — DIETITIAN INITIAL EVALUATION ADULT - OTHER INFO
GI/Intake:   -Poor intake reported this AM; per flowsheet 25-50% of intake noted   -Pt asked for Nepro supplemental; provided one and will recommend additional   -No BM documented thus far; no bowel regimen noted     Endo:   -Presented hypoglycemic; resolved  -Hx of T2DM  -Latest A1c: 8.1% - uncontrolled  -Insulin regimen noted PTA  -Lantus and SSI ordered in-house     Renal:   -Hx of ESRD-HD   -Pt receiving HD today   -Renal diet ordered   -NaCl ordered for additional hydration

## 2022-06-23 ENCOUNTER — TRANSCRIPTION ENCOUNTER (OUTPATIENT)
Age: 72
End: 2022-06-23

## 2022-06-23 ENCOUNTER — RESULT REVIEW (OUTPATIENT)
Age: 72
End: 2022-06-23

## 2022-06-23 LAB
ALBUMIN SERPL ELPH-MCNC: 2.5 G/DL — LOW (ref 3.3–5)
ALP SERPL-CCNC: 94 U/L — SIGNIFICANT CHANGE UP (ref 30–120)
ALT FLD-CCNC: 20 U/L DA — SIGNIFICANT CHANGE UP (ref 10–60)
ANION GAP SERPL CALC-SCNC: 8 MMOL/L — SIGNIFICANT CHANGE UP (ref 5–17)
AST SERPL-CCNC: 17 U/L — SIGNIFICANT CHANGE UP (ref 10–40)
BASOPHILS # BLD AUTO: 0.03 K/UL — SIGNIFICANT CHANGE UP (ref 0–0.2)
BASOPHILS NFR BLD AUTO: 0.2 % — SIGNIFICANT CHANGE UP (ref 0–2)
BILIRUB SERPL-MCNC: 0.3 MG/DL — SIGNIFICANT CHANGE UP (ref 0.2–1.2)
BUN SERPL-MCNC: 42 MG/DL — HIGH (ref 7–23)
CALCIUM SERPL-MCNC: 8.9 MG/DL — SIGNIFICANT CHANGE UP (ref 8.4–10.5)
CHLORIDE SERPL-SCNC: 93 MMOL/L — LOW (ref 96–108)
CO2 SERPL-SCNC: 30 MMOL/L — SIGNIFICANT CHANGE UP (ref 22–31)
CREAT SERPL-MCNC: 4.05 MG/DL — HIGH (ref 0.5–1.3)
EGFR: 11 ML/MIN/1.73M2 — LOW
EOSINOPHIL # BLD AUTO: 0.02 K/UL — SIGNIFICANT CHANGE UP (ref 0–0.5)
EOSINOPHIL NFR BLD AUTO: 0.1 % — SIGNIFICANT CHANGE UP (ref 0–6)
GLUCOSE SERPL-MCNC: 336 MG/DL — HIGH (ref 70–99)
HCT VFR BLD CALC: 32 % — LOW (ref 34.5–45)
HGB BLD-MCNC: 9.9 G/DL — LOW (ref 11.5–15.5)
IMM GRANULOCYTES NFR BLD AUTO: 0.6 % — SIGNIFICANT CHANGE UP (ref 0–1.5)
LYMPHOCYTES # BLD AUTO: 1.08 K/UL — SIGNIFICANT CHANGE UP (ref 1–3.3)
LYMPHOCYTES # BLD AUTO: 7 % — LOW (ref 13–44)
MAGNESIUM SERPL-MCNC: 2.3 MG/DL — SIGNIFICANT CHANGE UP (ref 1.6–2.6)
MCHC RBC-ENTMCNC: 28.8 PG — SIGNIFICANT CHANGE UP (ref 27–34)
MCHC RBC-ENTMCNC: 30.9 GM/DL — LOW (ref 32–36)
MCV RBC AUTO: 93 FL — SIGNIFICANT CHANGE UP (ref 80–100)
MONOCYTES # BLD AUTO: 1.62 K/UL — HIGH (ref 0–0.9)
MONOCYTES NFR BLD AUTO: 10.5 % — SIGNIFICANT CHANGE UP (ref 2–14)
NEUTROPHILS # BLD AUTO: 12.6 K/UL — HIGH (ref 1.8–7.4)
NEUTROPHILS NFR BLD AUTO: 81.6 % — HIGH (ref 43–77)
NRBC # BLD: 0 /100 WBCS — SIGNIFICANT CHANGE UP (ref 0–0)
PHOSPHATE SERPL-MCNC: 3.3 MG/DL — SIGNIFICANT CHANGE UP (ref 2.5–4.5)
PLATELET # BLD AUTO: 321 K/UL — SIGNIFICANT CHANGE UP (ref 150–400)
POTASSIUM SERPL-MCNC: 4.5 MMOL/L — SIGNIFICANT CHANGE UP (ref 3.5–5.3)
POTASSIUM SERPL-SCNC: 4.5 MMOL/L — SIGNIFICANT CHANGE UP (ref 3.5–5.3)
PROT SERPL-MCNC: 5.9 G/DL — LOW (ref 6–8.3)
RBC # BLD: 3.44 M/UL — LOW (ref 3.8–5.2)
RBC # FLD: 17.2 % — HIGH (ref 10.3–14.5)
SARS-COV-2 RNA SPEC QL NAA+PROBE: SIGNIFICANT CHANGE UP
SODIUM SERPL-SCNC: 131 MMOL/L — LOW (ref 135–145)
WBC # BLD: 15.45 K/UL — HIGH (ref 3.8–10.5)
WBC # FLD AUTO: 15.45 K/UL — HIGH (ref 3.8–10.5)

## 2022-06-23 PROCEDURE — 88311 DECALCIFY TISSUE: CPT | Mod: 26

## 2022-06-23 PROCEDURE — 99024 POSTOP FOLLOW-UP VISIT: CPT

## 2022-06-23 PROCEDURE — 99232 SBSQ HOSP IP/OBS MODERATE 35: CPT

## 2022-06-23 PROCEDURE — 73630 X-RAY EXAM OF FOOT: CPT | Mod: 26,RT

## 2022-06-23 PROCEDURE — 99233 SBSQ HOSP IP/OBS HIGH 50: CPT

## 2022-06-23 PROCEDURE — 28810 AMPUTATION TOE & METATARSAL: CPT | Mod: T5

## 2022-06-23 PROCEDURE — 88307 TISSUE EXAM BY PATHOLOGIST: CPT | Mod: 26

## 2022-06-23 RX ORDER — HEPARIN SODIUM 5000 [USP'U]/ML
5000 INJECTION INTRAVENOUS; SUBCUTANEOUS EVERY 12 HOURS
Refills: 0 | Status: DISCONTINUED | OUTPATIENT
Start: 2022-06-23 | End: 2022-07-07

## 2022-06-23 RX ORDER — INSULIN HUMAN 100 [IU]/ML
4 INJECTION, SOLUTION SUBCUTANEOUS ONCE
Refills: 0 | Status: DISCONTINUED | OUTPATIENT
Start: 2022-06-23 | End: 2022-06-23

## 2022-06-23 RX ORDER — DILTIAZEM HCL 120 MG
60 CAPSULE, EXT RELEASE 24 HR ORAL EVERY 8 HOURS
Refills: 0 | Status: DISCONTINUED | OUTPATIENT
Start: 2022-06-23 | End: 2022-06-27

## 2022-06-23 RX ORDER — SODIUM CHLORIDE 9 MG/ML
1000 INJECTION, SOLUTION INTRAVENOUS
Refills: 0 | Status: DISCONTINUED | OUTPATIENT
Start: 2022-06-23 | End: 2022-07-07

## 2022-06-23 RX ORDER — INSULIN LISPRO 100/ML
VIAL (ML) SUBCUTANEOUS
Refills: 0 | Status: DISCONTINUED | OUTPATIENT
Start: 2022-06-23 | End: 2022-07-02

## 2022-06-23 RX ORDER — OXYCODONE HYDROCHLORIDE 5 MG/1
5 TABLET ORAL ONCE
Refills: 0 | Status: DISCONTINUED | OUTPATIENT
Start: 2022-06-23 | End: 2022-06-23

## 2022-06-23 RX ORDER — ONDANSETRON 8 MG/1
4 TABLET, FILM COATED ORAL ONCE
Refills: 0 | Status: DISCONTINUED | OUTPATIENT
Start: 2022-06-23 | End: 2022-06-23

## 2022-06-23 RX ORDER — ACETAMINOPHEN 500 MG
650 TABLET ORAL EVERY 6 HOURS
Refills: 0 | Status: DISCONTINUED | OUTPATIENT
Start: 2022-06-23 | End: 2022-07-07

## 2022-06-23 RX ORDER — HYDROMORPHONE HYDROCHLORIDE 2 MG/ML
0.5 INJECTION INTRAMUSCULAR; INTRAVENOUS; SUBCUTANEOUS
Refills: 0 | Status: DISCONTINUED | OUTPATIENT
Start: 2022-06-23 | End: 2022-06-23

## 2022-06-23 RX ORDER — INSULIN LISPRO 100/ML
VIAL (ML) SUBCUTANEOUS AT BEDTIME
Refills: 0 | Status: DISCONTINUED | OUTPATIENT
Start: 2022-06-23 | End: 2022-06-23

## 2022-06-23 RX ORDER — INSULIN GLARGINE 100 [IU]/ML
8 INJECTION, SOLUTION SUBCUTANEOUS AT BEDTIME
Refills: 0 | Status: DISCONTINUED | OUTPATIENT
Start: 2022-06-23 | End: 2022-06-25

## 2022-06-23 RX ORDER — DEXTROSE 50 % IN WATER 50 %
25 SYRINGE (ML) INTRAVENOUS ONCE
Refills: 0 | Status: DISCONTINUED | OUTPATIENT
Start: 2022-06-23 | End: 2022-07-07

## 2022-06-23 RX ORDER — GLUCAGON INJECTION, SOLUTION 0.5 MG/.1ML
1 INJECTION, SOLUTION SUBCUTANEOUS ONCE
Refills: 0 | Status: DISCONTINUED | OUTPATIENT
Start: 2022-06-23 | End: 2022-07-07

## 2022-06-23 RX ORDER — PANTOPRAZOLE SODIUM 20 MG/1
40 TABLET, DELAYED RELEASE ORAL
Refills: 0 | Status: DISCONTINUED | OUTPATIENT
Start: 2022-06-23 | End: 2022-07-07

## 2022-06-23 RX ORDER — ASPIRIN/CALCIUM CARB/MAGNESIUM 324 MG
81 TABLET ORAL DAILY
Refills: 0 | Status: DISCONTINUED | OUTPATIENT
Start: 2022-06-23 | End: 2022-07-07

## 2022-06-23 RX ORDER — SODIUM CHLORIDE 9 MG/ML
1000 INJECTION, SOLUTION INTRAVENOUS
Refills: 0 | Status: DISCONTINUED | OUTPATIENT
Start: 2022-06-23 | End: 2022-06-23

## 2022-06-23 RX ORDER — INSULIN LISPRO 100/ML
VIAL (ML) SUBCUTANEOUS
Refills: 0 | Status: DISCONTINUED | OUTPATIENT
Start: 2022-06-23 | End: 2022-06-23

## 2022-06-23 RX ORDER — DIPHENHYDRAMINE HCL 50 MG
25 CAPSULE ORAL
Refills: 0 | Status: DISCONTINUED | OUTPATIENT
Start: 2022-06-23 | End: 2022-06-28

## 2022-06-23 RX ORDER — DEXTROSE 50 % IN WATER 50 %
15 SYRINGE (ML) INTRAVENOUS ONCE
Refills: 0 | Status: DISCONTINUED | OUTPATIENT
Start: 2022-06-23 | End: 2022-07-07

## 2022-06-23 RX ORDER — CLOPIDOGREL BISULFATE 75 MG/1
75 TABLET, FILM COATED ORAL DAILY
Refills: 0 | Status: DISCONTINUED | OUTPATIENT
Start: 2022-06-23 | End: 2022-07-07

## 2022-06-23 RX ORDER — INSULIN LISPRO 100/ML
4 VIAL (ML) SUBCUTANEOUS ONCE
Refills: 0 | Status: COMPLETED | OUTPATIENT
Start: 2022-06-23 | End: 2022-06-23

## 2022-06-23 RX ORDER — CALCIUM ACETATE 667 MG
667 TABLET ORAL
Refills: 0 | Status: DISCONTINUED | OUTPATIENT
Start: 2022-06-23 | End: 2022-06-28

## 2022-06-23 RX ORDER — LANOLIN ALCOHOL/MO/W.PET/CERES
3 CREAM (GRAM) TOPICAL AT BEDTIME
Refills: 0 | Status: DISCONTINUED | OUTPATIENT
Start: 2022-06-23 | End: 2022-07-07

## 2022-06-23 RX ORDER — ATORVASTATIN CALCIUM 80 MG/1
40 TABLET, FILM COATED ORAL AT BEDTIME
Refills: 0 | Status: DISCONTINUED | OUTPATIENT
Start: 2022-06-23 | End: 2022-07-07

## 2022-06-23 RX ORDER — METOPROLOL TARTRATE 50 MG
100 TABLET ORAL EVERY 12 HOURS
Refills: 0 | Status: DISCONTINUED | OUTPATIENT
Start: 2022-06-23 | End: 2022-06-27

## 2022-06-23 RX ORDER — CHLORHEXIDINE GLUCONATE 213 G/1000ML
1 SOLUTION TOPICAL
Refills: 0 | Status: DISCONTINUED | OUTPATIENT
Start: 2022-06-23 | End: 2022-07-07

## 2022-06-23 RX ADMIN — INSULIN GLARGINE 8 UNIT(S): 100 INJECTION, SOLUTION SUBCUTANEOUS at 22:06

## 2022-06-23 RX ADMIN — Medication 60 MILLIGRAM(S): at 05:30

## 2022-06-23 RX ADMIN — Medication 81 MILLIGRAM(S): at 18:53

## 2022-06-23 RX ADMIN — SODIUM CHLORIDE 75 MILLILITER(S): 9 INJECTION, SOLUTION INTRAVENOUS at 16:34

## 2022-06-23 RX ADMIN — Medication 4 UNIT(S): at 12:15

## 2022-06-23 RX ADMIN — Medication 650 MILLIGRAM(S): at 22:24

## 2022-06-23 RX ADMIN — Medication 650 MILLIGRAM(S): at 21:23

## 2022-06-23 RX ADMIN — Medication 50 MILLIGRAM(S): at 18:55

## 2022-06-23 RX ADMIN — Medication 3 MILLIGRAM(S): at 21:23

## 2022-06-23 RX ADMIN — CHLORHEXIDINE GLUCONATE 1 APPLICATION(S): 213 SOLUTION TOPICAL at 06:12

## 2022-06-23 RX ADMIN — Medication 4: at 07:38

## 2022-06-23 RX ADMIN — Medication 0.1 MILLIGRAM(S): at 18:51

## 2022-06-23 RX ADMIN — Medication 60 MILLIGRAM(S): at 21:22

## 2022-06-23 RX ADMIN — Medication 667 MILLIGRAM(S): at 19:00

## 2022-06-23 RX ADMIN — Medication 100 MILLIGRAM(S): at 05:29

## 2022-06-23 RX ADMIN — HYDROMORPHONE HYDROCHLORIDE 0.5 MILLIGRAM(S): 2 INJECTION INTRAMUSCULAR; INTRAVENOUS; SUBCUTANEOUS at 06:15

## 2022-06-23 RX ADMIN — HEPARIN SODIUM 5000 UNIT(S): 5000 INJECTION INTRAVENOUS; SUBCUTANEOUS at 18:49

## 2022-06-23 RX ADMIN — PANTOPRAZOLE SODIUM 40 MILLIGRAM(S): 20 TABLET, DELAYED RELEASE ORAL at 18:50

## 2022-06-23 RX ADMIN — CEFTRIAXONE 100 MILLIGRAM(S): 500 INJECTION, POWDER, FOR SOLUTION INTRAMUSCULAR; INTRAVENOUS at 14:53

## 2022-06-23 RX ADMIN — CLOPIDOGREL BISULFATE 75 MILLIGRAM(S): 75 TABLET, FILM COATED ORAL at 18:50

## 2022-06-23 RX ADMIN — ATORVASTATIN CALCIUM 40 MILLIGRAM(S): 80 TABLET, FILM COATED ORAL at 21:23

## 2022-06-23 RX ADMIN — AMLODIPINE BESYLATE 5 MILLIGRAM(S): 2.5 TABLET ORAL at 05:29

## 2022-06-23 RX ADMIN — Medication 0.1 MILLIGRAM(S): at 05:29

## 2022-06-23 RX ADMIN — Medication 1000 MILLIGRAM(S): at 05:30

## 2022-06-23 RX ADMIN — HYDROMORPHONE HYDROCHLORIDE 0.5 MILLIGRAM(S): 2 INJECTION INTRAMUSCULAR; INTRAVENOUS; SUBCUTANEOUS at 06:45

## 2022-06-23 NOTE — BRIEF OPERATIVE NOTE - NSICDXBRIEFPROCEDURE_GEN_ALL_CORE_FT
PROCEDURES:  Partial ray amputation of foot 23-Jun-2022 16:20:50  Marilu Fofana  Amputation of right great toe 23-Jun-2022 16:20:02  Marilu Fofana  
PROCEDURES:  Femoral popliteal bypass with prosthetic graft 21-Jun-2022 18:11:47  Mike Albarado

## 2022-06-23 NOTE — BRIEF OPERATIVE NOTE - NSICDXBRIEFPOSTOP_GEN_ALL_CORE_FT
POST-OP DIAGNOSIS:  Gangrene of toe of right foot 23-Jun-2022 16:21:43  Marilu Fofana  
POST-OP DIAGNOSIS:  Peripheral arterial occlusive disease 21-Jun-2022 18:21:43 Multivevel right LE occlusive PAD Mike Albarado

## 2022-06-23 NOTE — PROGRESS NOTE ADULT - ASSESSMENT
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 Lantus dec to 10 units qhs by endocrine. HD per nephro. Trops downtrended, was likely elevated from ESRD.    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 Lantus dec to 10 units qhs by endocrine. HD per nephro. Trops downtrended, was likely elevated from ESRD. will need cardiac clearance prior to any procedure or intervention.    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 Lantus dec to 10 units qhs by endocrine. HD per nephro. Trops downtrended, was likely elevated from ESRD. will need cardiac clearance -- noted. Plan for RLE revascularization tomorrow w/ vasc team. NPO after MN, active T+S, blood products on hold, will have HD session today. Likely podiatric intervention Thurs or Fri reg amputation of great hallux. PLAVIX HELD, RESUME POST PROCEDURE IF cleared by VASC    6/21/22 - Pt is an RCRI Class IV risk candidate going for intermediate risk procedure, and is medically optimized for this procedure. Will need close intraop cardiac monitoring. Cardiac clearance noted. Labs, VS reviewed. ECG w/ TWI in few precordial leads, cardio following. tentative plan for great toe amp Thurs or Fri, pods following. C/w Rocephin per ID. Continue holding Plavix in anticipation of potential amputation per pods.  *could not reach [facility] contact by phone     Problem/Plan - 1:  ·  Problem: Hypoglycemia.  ·  Plan: - Patient presented with c/o generalized weakness and fall and was found to have blood sugar in 30's  - Patient with hx of T2DM, on Lantus 40 units qhs not on any oral hypoglycemics per outpatient pharmacy   - Guicho paz Pt is NPO for surgery  - endocrine consult noted.     Problem/Plan - 2:  ·  Problem: SIRS (systemic inflammatory response syndrome).  ·  Plan: -- CXR: no clear evidence of PNA   - Hx of L medial malleolus growing klebsiella oxytoca/raoutella oxytoca, E. coli, MSSA. Recent blood cultures negative. Amputation planned for 06/23/2022.  -   - ID Dr. Tsai consult noted.  will c/w Rocephin as per ID.  Problem/Plan - 3:  ·  Problem: Pleural effusion.   ·  Plan: - No Hx of pulmonary disease  - Patient does not c/o cough, fever or chills  - CT Cervical shows Bilateral pleural effusions and interlobular septal thickening due to interstitial edema.  - c/w ceftriaxone   - ID Dr. Tsai, consult appreciated    Problem/Plan - 4:  ·  Problem: HFrEF (heart failure with reduced ejection fraction).   ·  Plan: - patient with hx of HFrEF  - last ECHO in 04/22 shows EF of 45%  - Admission labs show ProBNP of [id number]  - Likely elevated in the setting of ESRD  - Consult Dr. James, will appreciate recs   - Avoid excessive fluids.     Problem/Plan - 5:  ·  Problem: Elevated troponin.   ·  Plan: - Admission labs show elevated Troponin of 275.9  - Patient denies any chest pain, sob or palpitations  - EKG shows NSR with left axis deviation with non specific ST and T waves changes  - elevated troponin likely due to ESRD  - trend x3 or to peak.     Problem/Plan - 6:  ·  Problem: ESRD on hemodialysis.   ·  Plan: - Patient with hx of ESRD  - On HD TTS schedule  - admission eGFR 9  - Follow Dr. Edwards      Problem/Plan - 7:  ·  Problem: T2DM (type 2 diabetes mellitus).   ·  Plan: - Chronic stable  - On Lantus 40 qhs  - Will start on lantus 20 units qhs with LDSS given hypoglycemic episode  - Accu checks AC/HS  - Adjust insulin requirement based on blood sugar levels  - per outpatient pharmacy patient recently rx ademolog however has not yet picked up and pharmacy unsure of dose   - endocrinology consult, Dr. Perlman.     Problem/Plan - 8:  ·  Problem: Atrial fibrillation.   ·  Plan: - Patient with hx of P A.Fib  - Not on any AC because of hx of multiple falls   - on Lopressor 100mg q12 and Diltiazem 60mg q8 - will continue with hold parameters  - EKG shows NSR with left axis deviation with non specific ST and T waves changes.     Problem/Plan - 9:  ·  Problem: Benign essential HTN.   ·  Plan: - Chronic stable  - Continue Amlodipine 5 mg with hold parameters  - Dash/Renal Diet  - continue to monitor routine hemodynamics.     Problem/Plan - 10:  ·  Problem: HLD (hyperlipidemia).   ·  Plan; - Chronic stable  - On atorvastatin 40mg at home - will continue  - Dash/Renal diet.     Problem/Plan - 11:  ·  Problem: Depression, major.   ·  Plan: - Chronic stable  - Continue imipramine 50qhs.     Problem/Plan - 12:  ·  Problem: Need for prophylactic measure.   ·  Plan: - DVT Prophylaxis: Heparin 5000 units q12.

## 2022-06-23 NOTE — PROGRESS NOTE ADULT - ASSESSMENT
72 yo woman with PMH of HTN, DM2, CAD, CHF, PAD, A.fib, multiple falls and ESRD on HD was admitted on 6/16, came to ED with generalized weakness.   Her hypoglycemia and hypothermia on admission could be related to sepsis/SIRS. Source likely sec to Right hallux with a dry gangrene and superimposed infection.   TOMMY/PVR's noted.   Now S/P R fem-BK pop w/PTFE                 POD# 2  Doing well. Bypass patent. RLE well perfused    #PAD Chronic limb ischemia with multilevel occlusive PAD on right.  Cont DAPT for now. Will likely need Xarelto/Eliquis with Plavix on DC for graft patency  Maintain Prevena x 5 days  Neurovasc checks  OOB/PT   ABX continue  Podiatry input appreciated. Plan for R hallux amputation 6/23    #ESRD on HD  HD as per renal    #DM uncontrolled  Lantus and SS coverage as per medicine    Discussed with Dr Miller

## 2022-06-23 NOTE — PROGRESS NOTE ADULT - SUBJECTIVE AND OBJECTIVE BOX
Patient is a 71y Female with a known history of :  SIRS (systemic inflammatory response syndrome) [R65.10]    Hypoglycemia [E16.2]    Pleural effusion [J90]    CHF, acute [I50.9]    Chronic CHF [I50.9]    Elevated troponin [R77.8]    Atrial fibrillation [I48.91]    Benign essential HTN [I10]    HLD (hyperlipidemia) [E78.5]    Depression, major [F32.9]    Need for prophylactic measure [Z29.9]    ESRD on hemodialysis [N18.6]    T2DM (type 2 diabetes mellitus) [E11.9]    HFrEF (heart failure with reduced ejection fraction) [I50.20]    Gangrene of toe of right foot [I96]    Atherosclerotic PVD with ulceration [I70.209]      HPI:  Patient is a 71 year old female with PMH of A.fib rate controlled not on AC for hx of multiple falls, T2DM, HTN, HLD, ESRD on HD, CHF, s/p CABG brought in by EMS for generalized weakness at home. Patient states that she was doing well when in the afternoon she tried to get up from her couch and felt weak in the LE and fell back in her couch. Denies any hx of head trauma or loss of consciousness. Denies any weakness or numbness. Denies any chest pain, SOB or palpitations. No fever, cough or chills. No hx of recent travel or sick contacts. At the time of EMS arrival patient was found to have POCBS of 50. EMS gave dextrose and on arrival to the ED patient blood sugar was found to be in 30's with hypothermia of 94.9.    ED course:   Vitals:   BP: 176/85  HR:76  Temp:94.2  RR:18, O2:96% on RA   Significant Labs:   CBC: WBC:16.82 Hb:13.2 , Platlets: 260, Neutro:89   CMP: Lytes: WNL, BUN/Creatinine:48/4.8, Glucose:134 , Alkaline Phos:128, AST, 41, eGFR: 9, HsTrop: 275.9, ProBNP: 392992, Lactate: 2  UA; negative  CXR: Heart and lung appear normal (self read)  CT BRAIN: No acute intracranial bleeding. Central volume loss, chronic microvascular ischemic changes, and chronic bilateral lacunar infarctions.  CT CERVICAL SPINE: No fracture. Grade 1 C4-C5 anterolisthesis. C6-C7 disc degeneration. Bilateral pleural effusions and interlobular septal thickening due to   interstitial edema.  EKG: NSR, left axis deviation, HR 71,   QT/QTc: 426/462  Patient received: Ceftriaxone 1 g x1, Dextrose 5% + NS 1L x1, Dextrose 50% IV X1, heating blanket, 2L NC   (16 Jun 2022 01:19)      REVIEW OF SYSTEMS:    CONSTITUTIONAL: No fever, weight loss, or fatigue  EYES: No eye pain, visual disturbances, or discharge  ENMT:  No difficulty hearing, tinnitus, vertigo; No sinus or throat pain  NECK: No pain or stiffness  BREASTS: No pain, masses, or nipple discharge  RESPIRATORY: No cough, wheezing, chills or hemoptysis; No shortness of breath  CARDIOVASCULAR: No chest pain, palpitations, dizziness, or leg swelling  GASTROINTESTINAL: No abdominal or epigastric pain. No nausea, vomiting, or hematemesis; No diarrhea or constipation. No melena or hematochezia.  GENITOURINARY: No dysuria, frequency, hematuria, or incontinence  NEUROLOGICAL: No headaches, memory loss, loss of strength, numbness, or tremors  SKIN: No itching, burning, rashes, or lesions   LYMPH NODES: No enlarged glands  ENDOCRINE: No heat or cold intolerance; No hair loss  MUSCULOSKELETAL: No joint pain or swelling; No muscle, back, or extremity pain  PSYCHIATRIC: No depression, anxiety, mood swings, or difficulty sleeping  HEME/LYMPH: No easy bruising, or bleeding gums  ALLERGY AND IMMUNOLOGIC: No hives or eczema    MEDICATIONS  (STANDING):  acetaminophen     Tablet .. 1000 milliGRAM(s) Oral every 6 hours  amLODIPine   Tablet 5 milliGRAM(s) Oral daily  aspirin enteric coated 81 milliGRAM(s) Oral daily  atorvastatin 40 milliGRAM(s) Oral at bedtime  calcium acetate 667 milliGRAM(s) Oral three times a day with meals  cefTRIAXone   IVPB 1000 milliGRAM(s) IV Intermittent every 24 hours  chlorhexidine 4% Liquid 1 Application(s) Topical <User Schedule>  cloNIDine 0.1 milliGRAM(s) Oral two times a day  clopidogrel Tablet 75 milliGRAM(s) Oral daily  dextrose 5%. 1000 milliLiter(s) (50 mL/Hr) IV Continuous <Continuous>  dextrose 5%. 1000 milliLiter(s) (100 mL/Hr) IV Continuous <Continuous>  dextrose 50% Injectable 25 Gram(s) IV Push once  dextrose 50% Injectable 12.5 Gram(s) IV Push once  dextrose 50% Injectable 25 Gram(s) IV Push once  diltiazem    Tablet 60 milliGRAM(s) Oral every 8 hours  glucagon  Injectable 1 milliGRAM(s) IntraMuscular once  heparin   Injectable 5000 Unit(s) SubCutaneous every 12 hours  imipramine 50 milliGRAM(s) Oral daily  insulin glargine Injectable (LANTUS) 8 Unit(s) SubCutaneous at bedtime  insulin lispro (ADMELOG) corrective regimen sliding scale   SubCutaneous three times a day before meals  insulin lispro (ADMELOG) corrective regimen sliding scale   SubCutaneous at bedtime  metoprolol tartrate 100 milliGRAM(s) Oral every 12 hours  pantoprazole    Tablet 40 milliGRAM(s) Oral before breakfast  povidone iodine 10% Solution 1 Application(s) Topical daily  sodium chloride 0.9%. 1000 milliLiter(s) (50 mL/Hr) IV Continuous <Continuous>    MEDICATIONS  (PRN):  aluminum hydroxide/magnesium hydroxide/simethicone Suspension 30 milliLiter(s) Oral every 4 hours PRN Dyspepsia  dextrose Oral Gel 15 Gram(s) Oral once PRN Blood Glucose LESS THAN 70 milliGRAM(s)/deciliter  diphenhydrAMINE Injectable 25 milliGRAM(s) IV Push <User Schedule> PRN Combative behavior  HYDROmorphone  Injectable 1 milliGRAM(s) IV Push every 4 hours PRN Severe Pain (7 - 10)  HYDROmorphone  Injectable 0.5 milliGRAM(s) IV Push every 4 hours PRN Moderate Pain (4 - 6)  melatonin 3 milliGRAM(s) Oral at bedtime PRN Insomnia  ondansetron Injectable 4 milliGRAM(s) IV Push every 8 hours PRN Nausea and/or Vomiting      ALLERGIES: latex (Unknown)  No Known Drug Allergies      FAMILY HISTORY:  No pertinent family history in first degree relatives        PHYSICAL EXAMINATION:  -----------------------------  T(C): 36.4 (06-23-22 @ 05:02), Max: 36.9 (06-22-22 @ 20:31)  HR: 75 (06-23-22 @ 05:20) (57 - 75)  BP: 121/52 (06-23-22 @ 05:20) (98/54 - 138/62)  RR: 17 (06-23-22 @ 05:20) (17 - 25)  SpO2: 92% (06-23-22 @ 05:20) (92% - 100%)  Wt(kg): --    06-22 @ 07:01  -  06-23 @ 07:00  --------------------------------------------------------  IN:    IV PiggyBack: 50 mL  Total IN: 50 mL    OUT:    Other (mL): 2000 mL    Voided (mL): 0 mL  Total OUT: 2000 mL    Total NET: -1950 mL            VITALS  T(C): 36.4 (06-23-22 @ 05:02), Max: 36.9 (06-22-22 @ 20:31)  HR: 75 (06-23-22 @ 05:20) (57 - 75)  BP: 121/52 (06-23-22 @ 05:20) (98/54 - 138/62)  RR: 17 (06-23-22 @ 05:20) (17 - 25)  SpO2: 92% (06-23-22 @ 05:20) (92% - 100%)    Constitutional: well developed, normal appearance, well groomed, well nourished, no deformities and no acute distress.   Eyes: the conjunctiva exhibited no abnormalities and the eyelids demonstrated no xanthelasmas.   HEENT: normal oral mucosa, no oral pallor and no oral cyanosis.   Neck: normal jugular venous A waves present, normal jugular venous V waves present and no jugular venous wilson A waves.   Pulmonary: no respiratory distress, normal respiratory rhythm and effort, no accessory muscle use and lungs were clear to auscultation bilaterally.   Cardiovascular: heart rate and rhythm were normal, normal S1 and S2 and no murmur, gallop, rub, heave or thrill are present.   Abdomen: soft, non-tender, no hepato-splenomegaly and no abdominal mass palpated.   Musculoskeletal: the gait could not be assessed..   Extremities: no clubbing of the fingernails, no localized cyanosis, no petechial hemorrhages and no ischemic changes.   Skin: normal skin color and pigmentation, no rash, no venous stasis, no skin lesions, no skin ulcer and no xanthoma was observed.   Psychiatric: oriented to person, place, and time, the affect was normal, the mood was normal and not feeling anxious.     LABS:   --------  06-22    136  |  99  |  34<H>  ----------------------------<  208<H>  5.0   |  25  |  4.30<H>    Ca    8.0<L>      22 Jun 2022 05:25  Phos  5.7     06-22  Mg     2.2     06-22    TPro  5.7<L>  /  Alb  2.3<L>  /  TBili  0.3  /  DBili  x   /  AST  14<L>  /  ALT  12  /  AlkPhos  88  06-22                         9.9    15.45 )-----------( 321      ( 23 Jun 2022 07:20 )             32.0                 RADIOLOGY:  -----------------    ECG:     ECHO:

## 2022-06-23 NOTE — BRIEF OPERATIVE NOTE - OPERATION/FINDINGS
bone quality good
S/P prior R-->L bifem-fem BK pop bypass.  Patient with symmetric bilateral LE muscle atrophy.  Markedly calcified/atherosclerotic disease appreciated in the popliteal artery.

## 2022-06-23 NOTE — PROGRESS NOTE ADULT - SUBJECTIVE AND OBJECTIVE BOX
Patient is a 71y old  Female who presents with a chief complaint of SIRS (22 Jun 2022 14:33)      INTERVAL HPI/OVERNIGHT EVENTS: Pt seen and examined bedside, c/o pain in LLE. Post fem pop  for LT toe amputation today    MEDICATIONS  (STANDING):  acetaminophen     Tablet .. 1000 milliGRAM(s) Oral every 6 hours  amLODIPine   Tablet 5 milliGRAM(s) Oral daily  aspirin enteric coated 81 milliGRAM(s) Oral daily  atorvastatin 40 milliGRAM(s) Oral at bedtime  calcium acetate 667 milliGRAM(s) Oral three times a day with meals  cefTRIAXone   IVPB 1000 milliGRAM(s) IV Intermittent every 24 hours  chlorhexidine 4% Liquid 1 Application(s) Topical <User Schedule>  cloNIDine 0.1 milliGRAM(s) Oral two times a day  clopidogrel Tablet 75 milliGRAM(s) Oral daily  dextrose 5%. 1000 milliLiter(s) (50 mL/Hr) IV Continuous <Continuous>  dextrose 5%. 1000 milliLiter(s) (100 mL/Hr) IV Continuous <Continuous>  dextrose 50% Injectable 25 Gram(s) IV Push once  dextrose 50% Injectable 12.5 Gram(s) IV Push once  dextrose 50% Injectable 25 Gram(s) IV Push once  diltiazem    Tablet 60 milliGRAM(s) Oral every 8 hours  glucagon  Injectable 1 milliGRAM(s) IntraMuscular once  heparin   Injectable 5000 Unit(s) SubCutaneous every 12 hours  imipramine 50 milliGRAM(s) Oral daily  insulin glargine Injectable (LANTUS) 8 Unit(s) SubCutaneous at bedtime  insulin lispro (ADMELOG) corrective regimen sliding scale   SubCutaneous three times a day before meals  insulin lispro (ADMELOG) corrective regimen sliding scale   SubCutaneous at bedtime  metoprolol tartrate 100 milliGRAM(s) Oral every 12 hours  pantoprazole    Tablet 40 milliGRAM(s) Oral before breakfast  povidone iodine 10% Solution 1 Application(s) Topical daily  sodium chloride 0.9%. 1000 milliLiter(s) (50 mL/Hr) IV Continuous <Continuous>    MEDICATIONS  (PRN):  aluminum hydroxide/magnesium hydroxide/simethicone Suspension 30 milliLiter(s) Oral every 4 hours PRN Dyspepsia  dextrose Oral Gel 15 Gram(s) Oral once PRN Blood Glucose LESS THAN 70 milliGRAM(s)/deciliter  diphenhydrAMINE Injectable 25 milliGRAM(s) IV Push <User Schedule> PRN Combative behavior  HYDROmorphone  Injectable 1 milliGRAM(s) IV Push every 4 hours PRN Severe Pain (7 - 10)  HYDROmorphone  Injectable 0.5 milliGRAM(s) IV Push every 4 hours PRN Moderate Pain (4 - 6)  melatonin 3 milliGRAM(s) Oral at bedtime PRN Insomnia  ondansetron Injectable 4 milliGRAM(s) IV Push every 8 hours PRN Nausea and/or Vomiting      Allergies    latex (Unknown)  No Known Drug Allergies    Intolerances        REVIEW OF SYSTEMS:  CONSTITUTIONAL: No fever or chills  HEENT:  No headache, no sore throat  RESPIRATORY: No cough, wheezing, or shortness of breath  CARDIOVASCULAR: No chest pain, palpitations, or leg swelling  GASTROINTESTINAL: No abd pain, nausea, vomiting, or diarrhea  GENITOURINARY: No dysuria, frequency, or hematuria  NEUROLOGICAL: no focal weakness or dizziness  MUSCULOSKELETAL: LLE pain    Vital Signs Last 24 Hrs  T(C): 36.3 (23 Jun 2022 12:08), Max: 36.9 (22 Jun 2022 20:31)  T(F): 97.4 (23 Jun 2022 12:08), Max: 98.5 (22 Jun 2022 20:31)  HR: 63 (23 Jun 2022 12:08) (58 - 75)  BP: 103/53 (23 Jun 2022 12:08) (98/54 - 138/62)  BP(mean): 85 (22 Jun 2022 17:00) (81 - 92)  RR: 17 (23 Jun 2022 12:08) (17 - 25)  SpO2: 97% (23 Jun 2022 12:08) (92% - 100%)    PHYSICAL EXAM:  GENERAL: NAD  HEENT:  EOMI, moist mucous membranes  CHEST/LUNG:  CTA b/l, no rales, wheezes, or rhonchi  HEART:  RRR, S1, S2  ABDOMEN:  BS+, soft, nontender, nondistended  EXTREMITIES: no edema, cyanosis, or calf tenderness, Lt great toe dry gangrene.  NERVOUS SYSTEM: AA&Ox3    LABS:                        9.9    15.45 )-----------( 321      ( 23 Jun 2022 07:20 )             32.0     23 Jun 2022 07:20    131    |  93     |  42     ----------------------------<  336    4.5     |  30     |  4.05     Ca    8.9        23 Jun 2022 07:20  Phos  3.3       23 Jun 2022 07:20  Mg     2.3       23 Jun 2022 07:20    TPro  5.9    /  Alb  2.5    /  TBili  0.3    /  DBili  x      /  AST  17     /  ALT  20     /  AlkPhos  94     23 Jun 2022 07:20        CAPILLARY BLOOD GLUCOSE      POCT Blood Glucose.: 288 mg/dL (23 Jun 2022 11:17)  POCT Blood Glucose.: 317 mg/dL (23 Jun 2022 07:20)  POCT Blood Glucose.: 323 mg/dL (23 Jun 2022 05:18)  POCT Blood Glucose.: 489 mg/dL (22 Jun 2022 21:13)  POCT Blood Glucose.: 468 mg/dL (22 Jun 2022 21:10)  POCT Blood Glucose.: 364 mg/dL (22 Jun 2022 17:28)    UCx       RADIOLOGY & ADDITIONAL TESTS:          Culture - Urine (collected 06-16-22 @ 03:40)  Source: Clean Catch Clean Catch (Midstream)  Final Report (06-17-22 @ 07:32):    <10,000 CFU/mL Normal Urogenital Shantel    Culture - Blood (collected 06-16-22 @ 03:38)  Source: .Blood Blood-Peripheral  Final Report (06-21-22 @ 04:00):    No Growth Final    Culture - Blood (collected 06-16-22 @ 03:38)  Source: .Blood Blood-Peripheral  Final Report (06-21-22 @ 04:00):    No Growth Final        RADIOLOGY & ADDITIONAL TESTS:    Personally reviewed.     Consultant(s) Notes Reviewed:  [x] YES  [ ] NO    Care Discussed with [x] Consultants  [x] Patient  [ ] Family  [ ]      [ ] Other; RN  DVT ppx

## 2022-06-23 NOTE — PROGRESS NOTE ADULT - SUBJECTIVE AND OBJECTIVE BOX
Patient is a 71y old  Female who presents with a chief complaint of SIRS (22 Jun 2022 15:51)  Vascular surgery consulted for PAD with gangrene  Now S/P R fem-BK pop w/PTFE                 POD# 2  Pt c/o R foot pain and incisional pain managed with prescribed regimen. Denies numbness or weakness, CP or SOB  For R toe amp with podiatry today    Vital Signs Last 24 Hrs  T(C): 36.4 (23 Jun 2022 05:02), Max: 36.9 (22 Jun 2022 20:31)  T(F): 97.6 (23 Jun 2022 05:02), Max: 98.5 (22 Jun 2022 20:31)  HR: 75 (23 Jun 2022 05:20) (57 - 75)  BP: 121/52 (23 Jun 2022 05:20) (98/54 - 138/62)  BP(mean): 85 (22 Jun 2022 17:00) (71 - 92)  RR: 17 (23 Jun 2022 05:20) (17 - 25)  SpO2: 92% (23 Jun 2022 05:20) (92% - 100%)  I&O's Detail    22 Jun 2022 07:01  -  23 Jun 2022 07:00  --------------------------------------------------------  IN:    IV PiggyBack: 50 mL  Total IN: 50 mL    OUT:    Other (mL): 2000 mL    Voided (mL): 0 mL  Total OUT: 2000 mL    Total NET: -1950 mL    MEDICATIONS  (STANDING):  acetaminophen     Tablet .. 1000 milliGRAM(s) Oral every 6 hours  amLODIPine   Tablet 5 milliGRAM(s) Oral daily  aspirin enteric coated 81 milliGRAM(s) Oral daily  atorvastatin 40 milliGRAM(s) Oral at bedtime  calcium acetate 667 milliGRAM(s) Oral three times a day with meals  cefTRIAXone   IVPB 1000 milliGRAM(s) IV Intermittent every 24 hours  chlorhexidine 4% Liquid 1 Application(s) Topical <User Schedule>  cloNIDine 0.1 milliGRAM(s) Oral two times a day  clopidogrel Tablet 75 milliGRAM(s) Oral daily  dextrose 5%. 1000 milliLiter(s) (50 mL/Hr) IV Continuous <Continuous>  dextrose 5%. 1000 milliLiter(s) (100 mL/Hr) IV Continuous <Continuous>  dextrose 50% Injectable 25 Gram(s) IV Push once  dextrose 50% Injectable 12.5 Gram(s) IV Push once  dextrose 50% Injectable 25 Gram(s) IV Push once  diltiazem    Tablet 60 milliGRAM(s) Oral every 8 hours  glucagon  Injectable 1 milliGRAM(s) IntraMuscular once  heparin   Injectable 5000 Unit(s) SubCutaneous every 12 hours  imipramine 50 milliGRAM(s) Oral daily  insulin glargine Injectable (LANTUS) 8 Unit(s) SubCutaneous at bedtime  insulin lispro (ADMELOG) corrective regimen sliding scale   SubCutaneous three times a day before meals  insulin lispro (ADMELOG) corrective regimen sliding scale   SubCutaneous at bedtime  metoprolol tartrate 100 milliGRAM(s) Oral every 12 hours  pantoprazole    Tablet 40 milliGRAM(s) Oral before breakfast  povidone iodine 10% Solution 1 Application(s) Topical daily  sodium chloride 0.9%. 1000 milliLiter(s) (50 mL/Hr) IV Continuous <Continuous>    MEDICATIONS  (PRN):  aluminum hydroxide/magnesium hydroxide/simethicone Suspension 30 milliLiter(s) Oral every 4 hours PRN Dyspepsia  dextrose Oral Gel 15 Gram(s) Oral once PRN Blood Glucose LESS THAN 70 milliGRAM(s)/deciliter  diphenhydrAMINE Injectable 25 milliGRAM(s) IV Push <User Schedule> PRN Combative behavior  HYDROmorphone  Injectable 1 milliGRAM(s) IV Push every 4 hours PRN Severe Pain (7 - 10)  HYDROmorphone  Injectable 0.5 milliGRAM(s) IV Push every 4 hours PRN Moderate Pain (4 - 6)  melatonin 3 milliGRAM(s) Oral at bedtime PRN Insomnia  ondansetron Injectable 4 milliGRAM(s) IV Push every 8 hours PRN Nausea and/or Vomiting    Physical Exam:  General: NAD, resting comfortably in bed  Pulmonary: Nonlabored breathing, no respiratory distress  Cardiovascular: NSR  Abdominal: soft, NT/ND  Extremities: R great toe with dry gaangrene, no sig edema or erythema; 2nd toe distal with dry eschar. RLE incision with Prevena in place-good seal. + AT/PT and graft signals on R    LABS:                        9.9    15.45 )-----------( 321      ( 23 Jun 2022 07:20 )             32.0     06-22    136  |  99  |  34<H>  ----------------------------<  208<H>  5.0   |  25  |  4.30<H>    Ca    8.0<L>      22 Jun 2022 05:25  Phos  5.7     06-22  Mg     2.2     06-22    TPro  5.7<L>  /  Alb  2.3<L>  /  TBili  0.3  /  DBili  x   /  AST  14<L>  /  ALT  12  /  AlkPhos  88  06-22      CAPILLARY BLOOD GLUCOSE  POCT Blood Glucose.: 317 mg/dL (23 Jun 2022 07:20)  POCT Blood Glucose.: 323 mg/dL (23 Jun 2022 05:18)  POCT Blood Glucose.: 489 mg/dL (22 Jun 2022 21:13)  POCT Blood Glucose.: 468 mg/dL (22 Jun 2022 21:10)  POCT Blood Glucose.: 364 mg/dL (22 Jun 2022 17:28)  POCT Blood Glucose.: 154 mg/dL (22 Jun 2022 11:27)      Radiology and Additional Studies:

## 2022-06-23 NOTE — BRIEF OPERATIVE NOTE - NSICDXBRIEFPREOP_GEN_ALL_CORE_FT
PRE-OP DIAGNOSIS:  Gangrene of toe of right foot 23-Jun-2022 16:22:45  Marilu Fofana  
PRE-OP DIAGNOSIS:  Peripheral arterial occlusive disease 21-Jun-2022 18:18:18 Multivevel right LE occlusive PAD Mike Albarado

## 2022-06-23 NOTE — PROGRESS NOTE ADULT - ASSESSMENT
Patient is a 71 year old female with PMH of A.fib rate controlled not on AC for hx of multiple falls, T2DM, HTN, HLD, ESRD on HD, CHF, s/p CABG brought in by EMS for generalized weakness at home. Patient states that she was doing well when in the afternoon she tried to get up from her couch and felt weak in the LE and fell back in her couch. Denies any hx of head trauma or loss of consciousness. Denies any weakness or numbness. Denies any chest pain, SOB or palpitations. No fever, cough or chills. No hx of recent travel or sick contacts. At the time of EMS arrival patient was found to have POCBS of 50. EMS gave dextrose and on arrival to the ED patient blood sugar was found to be in 30's with hypothermia of 94.9.    ED course:   Vitals:   BP: 176/85  HR:76  Temp:94.2  RR:18, O2:96% on RA   Significant Labs:   CBC: WBC:16.82 Hb:13.2 , Platlets: 260, Neutro:89   CMP: Lytes: WNL, BUN/Creatinine:48/4.8, Glucose:134 , Alkaline Phos:128, AST, 41, eGFR: 9, HsTrop: 275.9, ProBNP: [id number], Lactate: 2  UA; negative  CXR: Heart and lung appear normal (self read)  CT BRAIN: No acute intracranial bleeding. Central volume loss, chronic microvascular ischemic changes, and chronic bilateral lacunar infarctions.  CT CERVICAL SPINE: No fracture. Grade 1 C4-C5 anterolisthesis. C6-C7 disc degeneration. Bilateral pleural effusions and interlobular septal thickening due to   interstitial edema.  EKG: NSR, left axis deviation, HR 71,   QT/QTc: 426/462  Patient received: Ceftriaxone 1 g x1, Dextrose 5% + NS 1L x1, Dextrose 50% IV X1, heating blanket, 2L NC   (16 Jun 2022 01:19)      esrd on hd plan for hd in am        ANEMIA PLAN:  Anemia of chronic disease:  We will continue epo  aiming for a HCT of 32-36 %.   We will monitor Iron stores, B12 and RBC folate .    BP monitoring,continue current antihypertensive meds, low salt diet  metoprolol tartrate 100 milliGRAM(s) Oral every 12 hours  cloNIDine 0.1 milliGRAM(s) Oral two times a day    f/u  blood and urine cx,serial lactate levels,monitor vitals closley,ivfs hydration,monitor urine output and renal profile,iv abx

## 2022-06-23 NOTE — PROGRESS NOTE ADULT - SUBJECTIVE AND OBJECTIVE BOX
Patient is a 71y Female whom presented to the hospital with esrd on hd     PAST MEDICAL & SURGICAL HISTORY:  Diabetes mellitus II      HTN (hypertension)      h/o Anxiety attack      Depression      h/o Myocardial infarct 2007      CAD (coronary artery disease)      h/o Hepatitis A 1969  currently resolved, no symptoms      PAD (peripheral artery disease)      Murmur, cardiac      h/o Smoking  quitted 3/2012      CRF (chronic renal failure), unspecified stage      Dialysis patient      Anemia secondary to renal failure      HTN (hypertension)      coronary stent 2007      s/p Ovarian cyst removal      s/p surgical removal of benign Skin lesion epigastric area          MEDICATIONS  (STANDING):  amLODIPine   Tablet 5 milliGRAM(s) Oral daily  aspirin enteric coated 81 milliGRAM(s) Oral daily  atorvastatin 40 milliGRAM(s) Oral at bedtime  calcium acetate 667 milliGRAM(s) Oral three times a day with meals  cefTRIAXone   IVPB 1000 milliGRAM(s) IV Intermittent every 24 hours  cloNIDine 0.1 milliGRAM(s) Oral two times a day  clopidogrel Tablet 75 milliGRAM(s) Oral daily  dextrose 5%. 1000 milliLiter(s) (100 mL/Hr) IV Continuous <Continuous>  dextrose 5%. 1000 milliLiter(s) (50 mL/Hr) IV Continuous <Continuous>  dextrose 50% Injectable 25 Gram(s) IV Push once  dextrose 50% Injectable 12.5 Gram(s) IV Push once  dextrose 50% Injectable 25 Gram(s) IV Push once  diltiazem    Tablet 60 milliGRAM(s) Oral every 8 hours  glucagon  Injectable 1 milliGRAM(s) IntraMuscular once  heparin   Injectable 5000 Unit(s) SubCutaneous every 12 hours  imipramine 50 milliGRAM(s) Oral daily  insulin glargine Injectable (LANTUS) 12 Unit(s) SubCutaneous at bedtime  insulin lispro (ADMELOG) corrective regimen sliding scale   SubCutaneous three times a day before meals  insulin lispro (ADMELOG) corrective regimen sliding scale   SubCutaneous at bedtime  metoprolol tartrate 100 milliGRAM(s) Oral every 12 hours  pantoprazole    Tablet 40 milliGRAM(s) Oral before breakfast  povidone iodine 10% Solution 1 Application(s) Topical daily      Allergies    latex (Unknown)  No Known Drug Allergies    Intolerances        SOCIAL HISTORY:  Denies ETOh,Smoking,     FAMILY HISTORY:  No pertinent family history in first degree relatives        REVIEW OF SYSTEMS:    CONSTITUTIONAL: No weakness, fevers or chills  RESPIRATORY: No cough, wheezing, hemoptysis; No shortness of breath  CARDIOVASCULAR: No chest pain or palpitations  GASTROINTESTINAL: No abdominal or epigastric pain. No nausea, vomiting,     No diarrhea or constipation. No melena   SKIN: dry                                    9.9    15.45 )-----------( 321      ( 23 Jun 2022 07:20 )             32.0       CBC Full  -  ( 23 Jun 2022 07:20 )  WBC Count : 15.45 K/uL  RBC Count : 3.44 M/uL  Hemoglobin : 9.9 g/dL  Hematocrit : 32.0 %  Platelet Count - Automated : 321 K/uL  Mean Cell Volume : 93.0 fl  Mean Cell Hemoglobin : 28.8 pg  Mean Cell Hemoglobin Concentration : 30.9 gm/dL  Auto Neutrophil # : 12.60 K/uL  Auto Lymphocyte # : 1.08 K/uL  Auto Monocyte # : 1.62 K/uL  Auto Eosinophil # : 0.02 K/uL  Auto Basophil # : 0.03 K/uL  Auto Neutrophil % : 81.6 %  Auto Lymphocyte % : 7.0 %  Auto Monocyte % : 10.5 %  Auto Eosinophil % : 0.1 %  Auto Basophil % : 0.2 %      06-23    131<L>  |  93<L>  |  42<H>  ----------------------------<  336<H>  4.5   |  30  |  4.05<H>    Ca    8.9      23 Jun 2022 07:20  Phos  3.3     06-23  Mg     2.3     06-23    TPro  5.9<L>  /  Alb  2.5<L>  /  TBili  0.3  /  DBili  x   /  AST  17  /  ALT  20  /  AlkPhos  94  06-23      CAPILLARY BLOOD GLUCOSE      POCT Blood Glucose.: 106 mg/dL (23 Jun 2022 18:16)  POCT Blood Glucose.: 131 mg/dL (23 Jun 2022 16:20)  POCT Blood Glucose.: 288 mg/dL (23 Jun 2022 11:17)  POCT Blood Glucose.: 317 mg/dL (23 Jun 2022 07:20)  POCT Blood Glucose.: 323 mg/dL (23 Jun 2022 05:18)  POCT Blood Glucose.: 489 mg/dL (22 Jun 2022 21:13)  POCT Blood Glucose.: 468 mg/dL (22 Jun 2022 21:10)      Vital Signs Last 24 Hrs  T(C): 36.4 (23 Jun 2022 18:21), Max: 36.9 (22 Jun 2022 20:31)  T(F): 97.6 (23 Jun 2022 18:21), Max: 98.5 (22 Jun 2022 20:31)  HR: 59 (23 Jun 2022 18:21) (56 - 75)  BP: 132/67 (23 Jun 2022 18:21) (99/58 - 132/67)  BP(mean): --  RR: 16 (23 Jun 2022 18:21) (13 - 18)  SpO2: 91% (23 Jun 2022 18:21) (91% - 100%)                                              PHYSICAL EXAM:    Constitutional: NAD  HEENT: conjunctive   clear   Neck:  No JVD  Respiratory: CTAB  Cardiovascular: S1 and S2  Gastrointestinal: BS+, soft, NT/ND  Skin: dry

## 2022-06-23 NOTE — PROGRESS NOTE ADULT - ASSESSMENT
pt with hypoglycemia  elevated troponin due to renal failure  ashd , s/p mi  s/p coronary stent  s/p cabg  [id number]-hfref  esrd - on hd  dm2  hypertension  paf -not on oac - fall risk  pvd - s/p stent  dyslipidemia   chf - compensated - recent coronary angiogram - patent graft -pt's cradiac status stable low  to intermediate risk for  pvd surgery and amputation of great  toe if needed  6/21  pt tolerated rt femoral -poplitael by-pass 6/21  cardiac status stable low risk for rt big toe amputation 6/23

## 2022-06-23 NOTE — PROGRESS NOTE ADULT - SUBJECTIVE AND OBJECTIVE BOX
[facility]  INFECTIOUS DISEASES   [address] [place] [address]  Tel: [phone]     Fax: [phone]  ======================================================  MD Zita Alejandra Kaushal, MD Cho, Michelle, MD   ======================================================    [id number]  SHILO KOHLER     Follow up: R foot wound/gangrene     Had fem/pop on 6/21, No fever, today for amputation.   Some pain in R leg.     PAST MEDICAL & SURGICAL HISTORY:  Diabetes mellitus II  HTN (hypertension)  h/o Anxiety attack  Depression  h/o Myocardial infarct 2007  CAD (coronary artery disease)  h/o Hepatitis A 1969  currently resolved, no symptoms  PAD (peripheral artery disease)  Murmur, cardiac  h/o Smoking  quitted 3/2012  CRF (chronic renal failure), unspecified stage  Dialysis patient  Anemia secondary to renal failure  HTN (hypertension)  coronary stent 2007  s/p Ovarian cyst removal  s/p surgical removal of benign Skin lesion epigastric area    Social Hx: no current smoking, ETOH or drugs     FAMILY HISTORY:  No pertinent family history in first degree relatives    Allergies  latex (Unknown)  No Known Drug Allergies    Antibiotics:  zosyn     REVIEW OF SYSTEMS:  CONSTITUTIONAL:  No Fever or chills  HEENT:  No diplopia or blurred vision.  No sore throat or runny nose.  CARDIOVASCULAR:  No chest pain or SOB.  RESPIRATORY:  No cough, shortness of breath, PND or orthopnea.  GASTROINTESTINAL:  No nausea, vomiting or diarrhea.  GENITOURINARY:  No dysuria, frequency or urgency. No Blood in urine  MUSCULOSKELETAL:  pain at surgical site   SKIN:  R foot wound   NEUROLOGIC:  No paresthesias, fasciculations, seizures or weakness.  PSYCHIATRIC:  No disorder of thought or mood.  ENDOCRINE:  No heat or cold intolerance, polyuria or polydipsia.  HEMATOLOGICAL:  No easy bruising or bleeding.     Physical Exam:  Vital Signs Last 24 Hrs  T(C): 36.4 (23 Jun 2022 13:23), Max: 36.9 (22 Jun 2022 20:31)  T(F): 97.5 (23 Jun 2022 13:23), Max: 98.5 (22 Jun 2022 20:31)  HR: 56 (23 Jun 2022 13:23) (56 - 75)  BP: 104/61 (23 Jun 2022 13:23) (98/54 - 132/60)  BP(mean): 85 (22 Jun 2022 17:00) (82 - 85)  RR: 18 (23 Jun 2022 13:23) (17 - 18)  SpO2: 99% (23 Jun 2022 13:23) (92% - 100%)  GEN: NAD  HEENT: normocephalic and atraumatic. EOMI. PERRL.    NECK: Supple.  No lymphadenopathy   LUNGS: Clear to auscultation.  HEART: Irregular rate and rhythm   ABDOMEN: Soft, nontender, and nondistended.  Positive bowel sounds.    : No CVA tenderness  EXTREMITIES: R hallux with an ulcer, dry gangrene   Now after surgery has dressing    R heel ulcer very superficial,   left medial malleolus small ulcer minimal serous discharge   NEUROLOGIC: grossly intact.  PSYCHIATRIC: Appropriate affect .  SKIN: No rash     Labs:                        9.9    15.45 )-----------( 321      ( 23 Jun 2022 07:20 )             32.0     06-23    131<L>  |  93<L>  |  42<H>  ----------------------------<  336<H>  4.5   |  30  |  4.05<H>    Ca    8.9      23 Jun 2022 07:20  Phos  3.3     06-23  Mg     2.3     06-23    TPro  5.9<L>  /  Alb  2.5<L>  /  TBili  0.3  /  DBili  x   /  AST  17  /  ALT  20  /  AlkPhos  94  06-23    Culture - Urine (collected 06-16-22 @ 03:40)  Source: Clean Catch Clean Catch (Midstream)  Final Report (06-17-22 @ 07:32):    <10,000 CFU/mL Normal Urogenital Shantel    Culture - Blood (collected 06-16-22 @ 03:38)  Source: .Blood Blood-Peripheral  Final Report (06-21-22 @ 04:00):    No Growth Final    Culture - Blood (collected 06-16-22 @ 03:38)  Source: .Blood Blood-Peripheral  Final Report (06-21-22 @ 04:00):    No Growth Final    WBC Count: 15.45 K/uL (06-23-22 @ 07:20)  WBC Count: 13.25 K/uL (06-22-22 @ 05:25)  WBC Count: 17.74 K/uL (06-21-22 @ 21:00)  WBC Count: 10.18 K/uL (06-21-22 @ 09:22)  WBC Count: 9.64 K/uL (06-20-22 @ 07:04)  WBC Count: 9.99 K/uL (06-19-22 @ 07:18)    Creatinine, Serum: 4.05 mg/dL (06-23-22 @ 07:20)  Creatinine, Serum: 4.30 mg/dL (06-22-22 @ 05:25)  Creatinine, Serum: 3.90 mg/dL (06-21-22 @ 21:00)  Creatinine, Serum: 4.80 mg/dL (06-20-22 @ 07:04)  Creatinine, Serum: 3.30 mg/dL (06-19-22 @ 07:18)    C-Reactive Protein, Serum: 19 mg/L (06-17-22 @ 10:38)    Sedimentation Rate, Erythrocyte: 13 mm/hr (06-17-22 @ 07:52)    COVID-19 PCR: NotDetec (06-23-22 @ 11:42)  COVID-19 PCR: NotDetec (06-20-22 @ 08:14)  SARS-CoV-2: NotDetec (06-15-22 @ 22:44)    All imaging and other data have been reviewed.  < from: CT Chest No Cont (06.16.22 @ 08:16) >  IMPRESSION:  Small layering bilateral pleural effusions decreased from 4/6/2022.  7 mm groundglass nodule within left lower lobe (series 2 image 44).   Recommend follow-up chest CT in 12 months to determine stability.    Assessment and Plan:   70 yo woman with PMH of HTN, DM2, CAD, CHF, A.fib, multiple falls and ESRD on HD was admitted on 6/16, came to ED with generalized weakness.   Her hypoglycemia and hypothermia on admission could be related to sepsis/SIRS source unclear at this time, could be foot infection? Left hallux looks like  a dry gangrene with superimposed infection.   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 She had Tissue cultures from 3/30/22 grew   klebsiella oxytoca/raoutella oxytoca, E. coli, MSSA so Cefazolin was given after HD to complete the course of treatment.   ESR 13 and CRP 19  MRSA PCR negative   6/21 s/p Femoral popliteal bypass with prosthetic graft    Recommendations:  - Blood culture x 2 NGTD  - leukocytosis reactive after surgery   - Vascular surgery follow up noted, s/p revascularization on 6/21  - Podiatry follow up noted for possible amputation today.   - Will continue ceftriaxone based on old culture    Will follow PRN.    Geovany Long MD  Division of Infectious Diseases   Please call ID service at [phone] with any question.      35 minutes spent on total encounter assessing patient, examination, chart reivew, counseling and coordinating care by the attending physician/nurse/care manager.

## 2022-06-24 LAB
ANION GAP SERPL CALC-SCNC: 8 MMOL/L — SIGNIFICANT CHANGE UP (ref 5–17)
BUN SERPL-MCNC: 52 MG/DL — HIGH (ref 7–23)
CALCIUM SERPL-MCNC: 8.7 MG/DL — SIGNIFICANT CHANGE UP (ref 8.5–10.1)
CHLORIDE SERPL-SCNC: 96 MMOL/L — SIGNIFICANT CHANGE UP (ref 96–108)
CO2 SERPL-SCNC: 29 MMOL/L — SIGNIFICANT CHANGE UP (ref 22–31)
CREAT SERPL-MCNC: 5.1 MG/DL — HIGH (ref 0.5–1.3)
EGFR: 8 ML/MIN/1.73M2 — LOW
GLUCOSE SERPL-MCNC: 267 MG/DL — HIGH (ref 70–99)
GRAM STN FLD: SIGNIFICANT CHANGE UP
HCT VFR BLD CALC: 30.8 % — LOW (ref 34.5–45)
HGB BLD-MCNC: 9.3 G/DL — LOW (ref 11.5–15.5)
MCHC RBC-ENTMCNC: 28.7 PG — SIGNIFICANT CHANGE UP (ref 27–34)
MCHC RBC-ENTMCNC: 30.2 GM/DL — LOW (ref 32–36)
MCV RBC AUTO: 95.1 FL — SIGNIFICANT CHANGE UP (ref 80–100)
NRBC # BLD: 0 /100 WBCS — SIGNIFICANT CHANGE UP (ref 0–0)
PLATELET # BLD AUTO: 288 K/UL — SIGNIFICANT CHANGE UP (ref 150–400)
POTASSIUM SERPL-MCNC: 4.7 MMOL/L — SIGNIFICANT CHANGE UP (ref 3.5–5.3)
POTASSIUM SERPL-SCNC: 4.7 MMOL/L — SIGNIFICANT CHANGE UP (ref 3.5–5.3)
RBC # BLD: 3.24 M/UL — LOW (ref 3.8–5.2)
RBC # FLD: 17.3 % — HIGH (ref 10.3–14.5)
SODIUM SERPL-SCNC: 133 MMOL/L — LOW (ref 135–145)
SPECIMEN SOURCE: SIGNIFICANT CHANGE UP
WBC # BLD: 14.26 K/UL — HIGH (ref 3.8–10.5)
WBC # FLD AUTO: 14.26 K/UL — HIGH (ref 3.8–10.5)

## 2022-06-24 PROCEDURE — 99024 POSTOP FOLLOW-UP VISIT: CPT

## 2022-06-24 PROCEDURE — 99233 SBSQ HOSP IP/OBS HIGH 50: CPT

## 2022-06-24 PROCEDURE — 99232 SBSQ HOSP IP/OBS MODERATE 35: CPT

## 2022-06-24 RX ORDER — ACETAMINOPHEN 500 MG
1000 TABLET ORAL ONCE
Refills: 0 | Status: COMPLETED | OUTPATIENT
Start: 2022-06-24 | End: 2022-06-24

## 2022-06-24 RX ORDER — CEFTRIAXONE 500 MG/1
2000 INJECTION, POWDER, FOR SOLUTION INTRAMUSCULAR; INTRAVENOUS EVERY 24 HOURS
Refills: 0 | Status: DISCONTINUED | OUTPATIENT
Start: 2022-06-24 | End: 2022-06-27

## 2022-06-24 RX ADMIN — HEPARIN SODIUM 5000 UNIT(S): 5000 INJECTION INTRAVENOUS; SUBCUTANEOUS at 05:34

## 2022-06-24 RX ADMIN — INSULIN GLARGINE 8 UNIT(S): 100 INJECTION, SOLUTION SUBCUTANEOUS at 21:49

## 2022-06-24 RX ADMIN — Medication 3 MILLIGRAM(S): at 22:30

## 2022-06-24 RX ADMIN — Medication 81 MILLIGRAM(S): at 14:39

## 2022-06-24 RX ADMIN — Medication 60 MILLIGRAM(S): at 21:38

## 2022-06-24 RX ADMIN — Medication 400 MILLIGRAM(S): at 01:25

## 2022-06-24 RX ADMIN — Medication 100 MILLIGRAM(S): at 05:31

## 2022-06-24 RX ADMIN — HEPARIN SODIUM 5000 UNIT(S): 5000 INJECTION INTRAVENOUS; SUBCUTANEOUS at 17:05

## 2022-06-24 RX ADMIN — Medication 1000 MILLIGRAM(S): at 02:26

## 2022-06-24 RX ADMIN — CLOPIDOGREL BISULFATE 75 MILLIGRAM(S): 75 TABLET, FILM COATED ORAL at 14:43

## 2022-06-24 RX ADMIN — Medication 2: at 14:38

## 2022-06-24 RX ADMIN — ATORVASTATIN CALCIUM 40 MILLIGRAM(S): 80 TABLET, FILM COATED ORAL at 21:38

## 2022-06-24 RX ADMIN — PANTOPRAZOLE SODIUM 40 MILLIGRAM(S): 20 TABLET, DELAYED RELEASE ORAL at 05:32

## 2022-06-24 RX ADMIN — Medication 667 MILLIGRAM(S): at 17:05

## 2022-06-24 RX ADMIN — Medication 650 MILLIGRAM(S): at 22:30

## 2022-06-24 RX ADMIN — Medication 3: at 17:03

## 2022-06-24 RX ADMIN — Medication 100 MILLIGRAM(S): at 17:07

## 2022-06-24 RX ADMIN — Medication 667 MILLIGRAM(S): at 07:54

## 2022-06-24 RX ADMIN — Medication 60 MILLIGRAM(S): at 14:43

## 2022-06-24 RX ADMIN — CHLORHEXIDINE GLUCONATE 1 APPLICATION(S): 213 SOLUTION TOPICAL at 05:30

## 2022-06-24 RX ADMIN — Medication 650 MILLIGRAM(S): at 12:20

## 2022-06-24 RX ADMIN — Medication 650 MILLIGRAM(S): at 11:20

## 2022-06-24 RX ADMIN — Medication 0.1 MILLIGRAM(S): at 17:06

## 2022-06-24 RX ADMIN — Medication 650 MILLIGRAM(S): at 23:30

## 2022-06-24 RX ADMIN — Medication 0.1 MILLIGRAM(S): at 05:33

## 2022-06-24 RX ADMIN — CEFTRIAXONE 100 MILLIGRAM(S): 500 INJECTION, POWDER, FOR SOLUTION INTRAMUSCULAR; INTRAVENOUS at 18:13

## 2022-06-24 RX ADMIN — Medication 667 MILLIGRAM(S): at 14:39

## 2022-06-24 RX ADMIN — Medication 50 MILLIGRAM(S): at 14:39

## 2022-06-24 RX ADMIN — Medication 4: at 07:54

## 2022-06-24 RX ADMIN — Medication 60 MILLIGRAM(S): at 05:30

## 2022-06-24 NOTE — PROGRESS NOTE ADULT - SUBJECTIVE AND OBJECTIVE BOX
[facility]  INFECTIOUS DISEASES   [address] [place] [address]  Tel: [phone]     Fax: [phone]  ======================================================  MD Zita Alejandra Kaushal, MD Cho, Michelle, MD   ======================================================    [id number]  SHILO KOHLER     Follow up: R foot wound/gangrene     Had fem/pop on 6/21, No fever, on 6/23 had amputation.   Some pain in R leg. No other complaint, getting HD.     PAST MEDICAL & SURGICAL HISTORY:  Diabetes mellitus II  HTN (hypertension)  h/o Anxiety attack  Depression  h/o Myocardial infarct 2007  CAD (coronary artery disease)  h/o Hepatitis A 1969  currently resolved, no symptoms  PAD (peripheral artery disease)  Murmur, cardiac  h/o Smoking  quitted 3/2012  CRF (chronic renal failure), unspecified stage  Dialysis patient  Anemia secondary to renal failure  HTN (hypertension)  coronary stent 2007  s/p Ovarian cyst removal  s/p surgical removal of benign Skin lesion epigastric area    Social Hx: no current smoking, ETOH or drugs     FAMILY HISTORY:  No pertinent family history in first degree relatives    Allergies  latex (Unknown)  No Known Drug Allergies    Antibiotics:  zosyn     REVIEW OF SYSTEMS:  CONSTITUTIONAL:  No Fever or chills  HEENT:  No diplopia or blurred vision.  No sore throat or runny nose.  CARDIOVASCULAR:  No chest pain or SOB.  RESPIRATORY:  No cough, shortness of breath, PND or orthopnea.  GASTROINTESTINAL:  No nausea, vomiting or diarrhea.  GENITOURINARY:  No dysuria, frequency or urgency. No Blood in urine  MUSCULOSKELETAL:  pain at surgical site   SKIN:  R foot wound   NEUROLOGIC:  No paresthesias, fasciculations, seizures or weakness.  PSYCHIATRIC:  No disorder of thought or mood.  ENDOCRINE:  No heat or cold intolerance, polyuria or polydipsia.  HEMATOLOGICAL:  No easy bruising or bleeding.     Physical Exam:  Vital Signs Last 24 Hrs  T(C): 36.7 (24 Jun 2022 09:50), Max: 36.7 (24 Jun 2022 04:51)  T(F): 98.1 (24 Jun 2022 09:50), Max: 98.1 (24 Jun 2022 04:51)  HR: 55 (24 Jun 2022 09:50) (55 - 72)  BP: 101/45 (24 Jun 2022 09:50) (99/58 - 132/67)  BP(mean): --  RR: 18 (24 Jun 2022 09:50) (13 - 18)  SpO2: 99% (24 Jun 2022 09:50) (91% - 100%)  GEN: NAD  HEENT: normocephalic and atraumatic. EOMI. PERRL.    NECK: Supple.  No lymphadenopathy   LUNGS: Clear to auscultation.  HEART: Irregular rate and rhythm   ABDOMEN: Soft, nontender, and nondistended.  Positive bowel sounds.    : No CVA tenderness  EXTREMITIES: Now with dressing in medical side of leg after vascular surgery  R foot dressed after amputation   NEUROLOGIC: grossly intact.  PSYCHIATRIC: Appropriate affect .  SKIN: No rash       Labs:                        9.3    14.26 )-----------( 288      ( 24 Jun 2022 08:07 )             30.8     06-24    133<L>  |  96  |  52<H>  ----------------------------<  267<H>  4.7   |  29  |  5.10<H>    Ca    8.7      24 Jun 2022 08:07  Phos  3.3     06-23  Mg     2.3     06-23    TPro  5.9<L>  /  Alb  2.5<L>  /  TBili  0.3  /  DBili  x   /  AST  17  /  ALT  20  /  AlkPhos  94  06-23    Culture - Tissue with Gram Stain (collected 06-23-22 @ 15:35)  Source: .Tissue Other, right hallux bone culture  Gram Stain (06-24-22 @ 04:37):    Rare polymorphonuclear leukocytes seen per low power field    Few Gram positive cocci in pairs seen per oil power field    Culture - Urine (collected 06-16-22 @ 03:40)  Source: Clean Catch Clean Catch (Midstream)  Final Report (06-17-22 @ 07:32):    <10,000 CFU/mL Normal Urogenital Shantel    Culture - Blood (collected 06-16-22 @ 03:38)  Source: .Blood Blood-Peripheral  Final Report (06-21-22 @ 04:00):    No Growth Final    Culture - Blood (collected 06-16-22 @ 03:38)  Source: .Blood Blood-Peripheral  Final Report (06-21-22 @ 04:00):    No Growth Final    WBC Count: 14.26 K/uL (06-24-22 @ 08:07)  WBC Count: 15.45 K/uL (06-23-22 @ 07:20)  WBC Count: 13.25 K/uL (06-22-22 @ 05:25)  WBC Count: 17.74 K/uL (06-21-22 @ 21:00)  WBC Count: 10.18 K/uL (06-21-22 @ 09:22)  WBC Count: 9.64 K/uL (06-20-22 @ 07:04)    Creatinine, Serum: 5.10 mg/dL (06-24-22 @ 08:07)  Creatinine, Serum: 4.05 mg/dL (06-23-22 @ 07:20)  Creatinine, Serum: 4.30 mg/dL (06-22-22 @ 05:25)  Creatinine, Serum: 3.90 mg/dL (06-21-22 @ 21:00)  Creatinine, Serum: 4.80 mg/dL (06-20-22 @ 07:04)    C-Reactive Protein, Serum: 19 mg/L (06-17-22 @ 10:38)    Sedimentation Rate, Erythrocyte: 13 mm/hr (06-17-22 @ 07:52)    COVID-19 PCR: NotDetec (06-23-22 @ 11:42)  COVID-19 PCR: NotDetec (06-20-22 @ 08:14)  SARS-CoV-2: NotDetec (06-15-22 @ 22:44)    All imaging and other data have been reviewed.  < from: CT Chest No Cont (06.16.22 @ 08:16) >  IMPRESSION:  Small layering bilateral pleural effusions decreased from 4/6/2022.  7 mm groundglass nodule within left lower lobe (series 2 image 44).   Recommend follow-up chest CT in 12 months to determine stability.    Assessment and Plan:   70 yo woman with PMH of HTN, DM2, CAD, CHF, A.fib, multiple falls and ESRD on HD was admitted on 6/16, came to ED with generalized weakness.   Her hypoglycemia and hypothermia on admission could be related to sepsis/SIRS source unclear at this time, could be foot infection? Left hallux looks like  a dry gangrene with superimposed infection.   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 She had Tissue cultures from 3/30/22 grew   klebsiella oxytoca/raoutella oxytoca, E. coli, MSSA so Cefazolin was given after HD to complete the course of treatment.   ESR 13 and CRP 19  MRSA PCR negative   6/21 s/p Femoral popliteal bypass with prosthetic graft    Recommendations:  - Blood culture x 2 NGTD  - leukocytosis reactive after surgery   - Vascular surgery follow up noted, s/p revascularization on 6/21  - Podiatry follow up noted s/p R hallux amputation on 6/23  - Follow up cultures sent from OR on 6/23 now with GPC in pairs  - Follow up pathology report  - Will continue ceftriaxone based on old culture    Will follow PRN.    Geovany Long MD  Division of Infectious Diseases   Please call ID service at [phone] with any question.      35 minutes spent on total encounter assessing patient, examination, chart reivew, counseling and coordinating care by the attending physician/nurse/care manager.

## 2022-06-24 NOTE — PROGRESS NOTE ADULT - SUBJECTIVE AND OBJECTIVE BOX
Patient is a 71y Female whom presented to the hospital with esrd on hd     PAST MEDICAL & SURGICAL HISTORY:  Diabetes mellitus II      HTN (hypertension)      h/o Anxiety attack      Depression      h/o Myocardial infarct 2007      CAD (coronary artery disease)      h/o Hepatitis A 1969  currently resolved, no symptoms      PAD (peripheral artery disease)      Murmur, cardiac      h/o Smoking  quitted 3/2012      CRF (chronic renal failure), unspecified stage      Dialysis patient      Anemia secondary to renal failure      HTN (hypertension)      coronary stent 2007      s/p Ovarian cyst removal      s/p surgical removal of benign Skin lesion epigastric area          MEDICATIONS  (STANDING):  amLODIPine   Tablet 5 milliGRAM(s) Oral daily  aspirin enteric coated 81 milliGRAM(s) Oral daily  atorvastatin 40 milliGRAM(s) Oral at bedtime  calcium acetate 667 milliGRAM(s) Oral three times a day with meals  cefTRIAXone   IVPB 1000 milliGRAM(s) IV Intermittent every 24 hours  cloNIDine 0.1 milliGRAM(s) Oral two times a day  clopidogrel Tablet 75 milliGRAM(s) Oral daily  dextrose 5%. 1000 milliLiter(s) (100 mL/Hr) IV Continuous <Continuous>  dextrose 5%. 1000 milliLiter(s) (50 mL/Hr) IV Continuous <Continuous>  dextrose 50% Injectable 25 Gram(s) IV Push once  dextrose 50% Injectable 12.5 Gram(s) IV Push once  dextrose 50% Injectable 25 Gram(s) IV Push once  diltiazem    Tablet 60 milliGRAM(s) Oral every 8 hours  glucagon  Injectable 1 milliGRAM(s) IntraMuscular once  heparin   Injectable 5000 Unit(s) SubCutaneous every 12 hours  imipramine 50 milliGRAM(s) Oral daily  insulin glargine Injectable (LANTUS) 12 Unit(s) SubCutaneous at bedtime  insulin lispro (ADMELOG) corrective regimen sliding scale   SubCutaneous three times a day before meals  insulin lispro (ADMELOG) corrective regimen sliding scale   SubCutaneous at bedtime  metoprolol tartrate 100 milliGRAM(s) Oral every 12 hours  pantoprazole    Tablet 40 milliGRAM(s) Oral before breakfast  povidone iodine 10% Solution 1 Application(s) Topical daily      Allergies    latex (Unknown)  No Known Drug Allergies    Intolerances        SOCIAL HISTORY:  Denies ETOh,Smoking,     FAMILY HISTORY:  No pertinent family history in first degree relatives        REVIEW OF SYSTEMS:    CONSTITUTIONAL: No weakness, fevers or chills  RESPIRATORY: No cough, wheezing, hemoptysis; No shortness of breath  CARDIOVASCULAR: No chest pain or palpitations  GASTROINTESTINAL: No abdominal or epigastric pain. No nausea, vomiting,     No diarrhea or constipation. No melena   SKIN: dry                                                  9.3    14.26 )-----------( 288      ( 24 Jun 2022 08:07 )             30.8       CBC Full  -  ( 24 Jun 2022 08:07 )  WBC Count : 14.26 K/uL  RBC Count : 3.24 M/uL  Hemoglobin : 9.3 g/dL  Hematocrit : 30.8 %  Platelet Count - Automated : 288 K/uL  Mean Cell Volume : 95.1 fl  Mean Cell Hemoglobin : 28.7 pg  Mean Cell Hemoglobin Concentration : 30.2 gm/dL  Auto Neutrophil # : x  Auto Lymphocyte # : x  Auto Monocyte # : x  Auto Eosinophil # : x  Auto Basophil # : x  Auto Neutrophil % : x  Auto Lymphocyte % : x  Auto Monocyte % : x  Auto Eosinophil % : x  Auto Basophil % : x      06-24    133<L>  |  96  |  52<H>  ----------------------------<  267<H>  4.7   |  29  |  5.10<H>    Ca    8.7      24 Jun 2022 08:07  Phos  3.3     06-23  Mg     2.3     06-23    TPro  5.9<L>  /  Alb  2.5<L>  /  TBili  0.3  /  DBili  x   /  AST  17  /  ALT  20  /  AlkPhos  94  06-23      CAPILLARY BLOOD GLUCOSE      POCT Blood Glucose.: 236 mg/dL (24 Jun 2022 14:31)  POCT Blood Glucose.: 193 mg/dL (24 Jun 2022 13:16)  POCT Blood Glucose.: 213 mg/dL (24 Jun 2022 11:38)  POCT Blood Glucose.: 310 mg/dL (24 Jun 2022 07:38)  POCT Blood Glucose.: 212 mg/dL (23 Jun 2022 21:54)  POCT Blood Glucose.: 106 mg/dL (23 Jun 2022 18:16)  POCT Blood Glucose.: 131 mg/dL (23 Jun 2022 16:20)      Vital Signs Last 24 Hrs  T(C): 36.7 (24 Jun 2022 14:45), Max: 36.7 (24 Jun 2022 04:51)  T(F): 98.1 (24 Jun 2022 14:45), Max: 98.1 (24 Jun 2022 04:51)  HR: 60 (24 Jun 2022 14:45) (55 - 72)  BP: 144/57 (24 Jun 2022 14:45) (99/58 - 144/57)  BP(mean): --  RR: 18 (24 Jun 2022 14:45) (13 - 18)  SpO2: 96% (24 Jun 2022 14:45) (91% - 100%)                                                  PHYSICAL EXAM:    Constitutional: NAD  HEENT: conjunctive   clear   Neck:  No JVD  Respiratory: CTAB  Cardiovascular: S1 and S2  Gastrointestinal: BS+, soft, NT/ND  Skin: dry                          bladder stone

## 2022-06-24 NOTE — PROGRESS NOTE ADULT - SUBJECTIVE AND OBJECTIVE BOX
Patient is a 71y old  Female who presents with a chief complaint of SIRS (22 Jun 2022 14:33)      INTERVAL HPI/OVERNIGHT EVENTS: Pt seen and examined bedside, c/o pain in RLE. S/P R fem-BK pop w/PTFE POD# 3, S/P Partial ray amputation of R foot  POD #1    MEDICATIONS  (STANDING):  aspirin enteric coated 81 milliGRAM(s) Oral daily  atorvastatin 40 milliGRAM(s) Oral at bedtime  calcium acetate 667 milliGRAM(s) Oral three times a day with meals  chlorhexidine 4% Liquid 1 Application(s) Topical <User Schedule>  cloNIDine 0.1 milliGRAM(s) Oral two times a day  clopidogrel Tablet 75 milliGRAM(s) Oral daily  dextrose 5%. 1000 milliLiter(s) (50 mL/Hr) IV Continuous <Continuous>  dextrose 50% Injectable 25 Gram(s) IV Push once  diltiazem    Tablet 60 milliGRAM(s) Oral every 8 hours  glucagon  Injectable 1 milliGRAM(s) IntraMuscular once  heparin   Injectable 5000 Unit(s) SubCutaneous every 12 hours  imipramine 50 milliGRAM(s) Oral daily  insulin glargine Injectable (LANTUS) 8 Unit(s) SubCutaneous at bedtime  insulin lispro (ADMELOG) corrective regimen sliding scale   SubCutaneous three times a day before meals  metoprolol tartrate 100 milliGRAM(s) Oral every 12 hours  pantoprazole    Tablet 40 milliGRAM(s) Oral before breakfast    MEDICATIONS  (PRN):  acetaminophen     Tablet .. 650 milliGRAM(s) Oral every 6 hours PRN Temp greater or equal to 38C (100.4F), Mild Pain (1 - 3)  aluminum hydroxide/magnesium hydroxide/simethicone Suspension 30 milliLiter(s) Oral every 4 hours PRN Dyspepsia  dextrose Oral Gel 15 Gram(s) Oral once PRN Blood Glucose LESS THAN 70 milliGRAM(s)/deciliter  diphenhydrAMINE Injectable 25 milliGRAM(s) IV Push <User Schedule> PRN Combative behavior  melatonin 3 milliGRAM(s) Oral at bedtime PRN Insomnia      Allergies    latex (Unknown)  No Known Drug Allergies    Intolerances        REVIEW OF SYSTEMS:  CONSTITUTIONAL: No fever or chills  HEENT:  No headache, no sore throat  RESPIRATORY: No cough, wheezing, or shortness of breath  CARDIOVASCULAR: No chest pain, palpitations, or leg swelling  GASTROINTESTINAL: No abd pain, nausea, vomiting, or diarrhea  GENITOURINARY: No dysuria, frequency, or hematuria  NEUROLOGICAL: no focal weakness or dizziness  MUSCULOSKELETAL: RLE pain    Vital Signs Last 24 Hrs  T(C): 36.7 (24 Jun 2022 09:50), Max: 36.7 (24 Jun 2022 04:51)  T(F): 98.1 (24 Jun 2022 09:50), Max: 98.1 (24 Jun 2022 04:51)  HR: 55 (24 Jun 2022 09:50) (55 - 72)  BP: 101/45 (24 Jun 2022 09:50) (99/58 - 132/67)  BP(mean): --  RR: 18 (24 Jun 2022 09:50) (13 - 18)  SpO2: 99% (24 Jun 2022 09:50) (91% - 100%)    PHYSICAL EXAM:  GENERAL: NAD  HEENT:  EOMI, moist mucous membranes  CHEST/LUNG:  CTA b/l, no rales, wheezes, or rhonchi  HEART:  RRR, S1, S2  ABDOMEN:  BS+, soft, nontender, nondistended  EXTREMITIES: no edema, cyanosis, or calf tenderness, Rt great toe dry gangrene.  NERVOUS SYSTEM: AA&Ox3    LABS:                        9.3    14.26 )-----------( 288      ( 24 Jun 2022 08:07 )             30.8     24 Jun 2022 08:07    133    |  96     |  52     ----------------------------<  267    4.7     |  29     |  5.10     Ca    8.7        24 Jun 2022 08:07          CAPILLARY BLOOD GLUCOSE      POCT Blood Glucose.: 310 mg/dL (24 Jun 2022 07:38)  POCT Blood Glucose.: 212 mg/dL (23 Jun 2022 21:54)  POCT Blood Glucose.: 106 mg/dL (23 Jun 2022 18:16)  POCT Blood Glucose.: 131 mg/dL (23 Jun 2022 16:20)    UCx       RADIOLOGY & ADDITIONAL TESTS:            Culture - Urine (collected 06-16-22 @ 03:40)  Source: Clean Catch Clean Catch (Midstream)  Final Report (06-17-22 @ 07:32):    <10,000 CFU/mL Normal Urogenital Shantel    Culture - Blood (collected 06-16-22 @ 03:38)  Source: .Blood Blood-Peripheral  Final Report (06-21-22 @ 04:00):    No Growth Final    Culture - Blood (collected 06-16-22 @ 03:38)  Source: .Blood Blood-Peripheral  Final Report (06-21-22 @ 04:00):    No Growth Final        RADIOLOGY & ADDITIONAL TESTS:    Personally reviewed.     Consultant(s) Notes Reviewed:  [x] YES  [ ] NO    Care Discussed with [x] Consultants  [x] Patient  [ ] Family  [ ]      [ ] Other; RN  DVT ppx

## 2022-06-24 NOTE — PROGRESS NOTE ADULT - SUBJECTIVE AND OBJECTIVE BOX
Patient is a 71y old  Female who presents with a chief complaint of SIRS (24 Jun 2022 08:38)  Vascular surgery consulted for PAD with gangrene  S/P R fem-BK pop w/PTFE                     POD# 3  S/P Partial ray amputation of R foot       POD #1  Pt c/o R foot pain and incisional pain managed with prescribed regimen. Denies numbness or weakness, CP or SOB    MEDICATIONS  (STANDING):  aspirin enteric coated 81 milliGRAM(s) Oral daily  atorvastatin 40 milliGRAM(s) Oral at bedtime  calcium acetate 667 milliGRAM(s) Oral three times a day with meals  chlorhexidine 4% Liquid 1 Application(s) Topical <User Schedule>  cloNIDine 0.1 milliGRAM(s) Oral two times a day  clopidogrel Tablet 75 milliGRAM(s) Oral daily  dextrose 5%. 1000 milliLiter(s) (50 mL/Hr) IV Continuous <Continuous>  dextrose 50% Injectable 25 Gram(s) IV Push once  diltiazem    Tablet 60 milliGRAM(s) Oral every 8 hours  glucagon  Injectable 1 milliGRAM(s) IntraMuscular once  heparin   Injectable 5000 Unit(s) SubCutaneous every 12 hours  imipramine 50 milliGRAM(s) Oral daily  insulin glargine Injectable (LANTUS) 8 Unit(s) SubCutaneous at bedtime  insulin lispro (ADMELOG) corrective regimen sliding scale   SubCutaneous three times a day before meals  metoprolol tartrate 100 milliGRAM(s) Oral every 12 hours  pantoprazole    Tablet 40 milliGRAM(s) Oral before breakfast    MEDICATIONS  (PRN):  acetaminophen     Tablet .. 650 milliGRAM(s) Oral every 6 hours PRN Temp greater or equal to 38C (100.4F), Mild Pain (1 - 3)  aluminum hydroxide/magnesium hydroxide/simethicone Suspension 30 milliLiter(s) Oral every 4 hours PRN Dyspepsia  dextrose Oral Gel 15 Gram(s) Oral once PRN Blood Glucose LESS THAN 70 milliGRAM(s)/deciliter  diphenhydrAMINE Injectable 25 milliGRAM(s) IV Push <User Schedule> PRN Combative behavior  melatonin 3 milliGRAM(s) Oral at bedtime PRN Insomnia    Allergies    latex (Unknown)  No Known Drug Allergies    Intolerances    Vital Signs Last 24 Hrs  T(C): 36.7 (24 Jun 2022 04:51), Max: 36.7 (24 Jun 2022 04:51)  T(F): 98.1 (24 Jun 2022 04:51), Max: 98.1 (24 Jun 2022 04:51)  HR: 72 (24 Jun 2022 04:51) (56 - 72)  BP: 125/51 (24 Jun 2022 04:51) (99/58 - 132/67)  BP(mean): --  RR: 18 (24 Jun 2022 04:51) (13 - 18)  SpO2: 93% (24 Jun 2022 04:51) (91% - 100%)  I&O's Detail    23 Jun 2022 07:01  -  24 Jun 2022 07:00  --------------------------------------------------------  IN:    Lactated Ringers: 75 mL    Oral Fluid: 120 mL  Total IN: 195 mL    OUT:  Total OUT: 0 mL    Total NET: 195 mL          Physical Exam:  General: NAD, resting comfortably in bed  Pulmonary: Nonlabored breathing, no respiratory distress, diminished at bases  Cardiovascular: Normal S1, S2  Abdominal: soft, NT/ND  Extremities: R foot dressing CDI. RLE incision with Prevena in place-good seal. + AT/PT and graft signals on R    LABS:                        9.3    14.26 )-----------( 288      ( 24 Jun 2022 08:07 )             30.8     06-23    131<L>  |  93<L>  |  42<H>  ----------------------------<  336<H>  4.5   |  30  |  4.05<H>    Ca    8.9      23 Jun 2022 07:20  Phos  3.3     06-23  Mg     2.3     06-23    TPro  5.9<L>  /  Alb  2.5<L>  /  TBili  0.3  /  DBili  x   /  AST  17  /  ALT  20  /  AlkPhos  94  06-23      CAPILLARY BLOOD GLUCOSE  POCT Blood Glucose.: 310 mg/dL (24 Jun 2022 07:38)  POCT Blood Glucose.: 212 mg/dL (23 Jun 2022 21:54)  POCT Blood Glucose.: 106 mg/dL (23 Jun 2022 18:16)  POCT Blood Glucose.: 131 mg/dL (23 Jun 2022 16:20)  POCT Blood Glucose.: 288 mg/dL (23 Jun 2022 11:17)    Radiology and Additional Studies:

## 2022-06-24 NOTE — PROGRESS NOTE ADULT - ASSESSMENT
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 Lantus dec to 10 units qhs by endocrine. HD per nephro. Trops downtrended, was likely elevated from ESRD.    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 Lantus dec to 10 units qhs by endocrine. HD per nephro. Trops downtrended, was likely elevated from ESRD. will need cardiac clearance prior to any procedure or intervention.    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 Lantus dec to 10 units qhs by endocrine. HD per nephro. Trops downtrended, was likely elevated from ESRD. will need cardiac clearance -- noted. Plan for RLE revascularization tomorrow w/ vasc team. NPO after MN, active T+S, blood products on hold, will have HD session today. Likely podiatric intervention Thurs or Fri reg amputation of great hallux. PLAVIX HELD, RESUME POST PROCEDURE IF cleared by VASC    6/21/22 - Pt is an RCRI Class IV risk candidate going for intermediate risk procedure, and is medically optimized for this procedure. Will need close intraop cardiac monitoring. Cardiac clearance noted. Labs, VS reviewed. ECG w/ TWI in few precordial leads, cardio following. tentative plan for great toe amp Thurs or Fri, pods following. C/w Rocephin per ID. Continue holding Plavix in anticipation of potential amputation per pods.  *could not reach [facility] contact by phone     Problem/Plan - 1:  ·  Problem: Hypoglycemia.  ·  Plan: - Patient presented with c/o generalized weakness and fall and was found to have blood sugar in 30's  - Patient with hx of T2DM, on Lantus 40 units qhs not on any oral hypoglycemics per outpatient pharmacy   - Accu cheks and c/w Lantus and ISS   - endocrine consult noted.     Problem/Plan - 2:  ·  Problem: SIRS (systemic inflammatory response syndrome). probably 2/2 to Rt toe infection  ·  Plan: -- CXR: no clear evidence of PNA   - Hx of L medial malleolus growing klebsiella oxytoca/raoutella oxytoca, E. coli, MSSA. Recent blood cultures negative.  - S/P R fem-BK pop w/PTFE POD# 3, S/P Partial ray amputation of R foot  POD #1  - reactive leucocytosis.  - Follow with bone cx.  - ID Dr. Tsai consult noted.  will c/w Rocephin as per ID.  Problem/Plan - 3:  ·  Problem: Pleural effusion.   ·  Plan: - No Hx of pulmonary disease  - Patient does not c/o cough, fever or chills  - CT Cervical shows Bilateral pleural effusions and interlobular septal thickening due to interstitial edema.  - ID Dr. Tsai, consult appreciated    Problem/Plan - 4:  ·  Problem: HFrEF (heart failure with reduced ejection fraction).   ·  Plan: - patient with hx of HFrEF  - last ECHO in 04/22 shows EF of 45%  - Admission labs show ProBNP of [id number]  - Likely elevated in the setting of ESRD  - Consult Dr. James, will appreciate recs   - Avoid excessive fluids.     Problem/Plan - 5:  ·  Problem: Elevated troponin.   ·  Plan: - Admission labs show elevated Troponin of 275.9  - Patient denies any chest pain, sob or palpitations  - EKG shows NSR with left axis deviation with non specific ST and T waves changes  - elevated troponin likely due to ESRD  - trend x3 or to peak.     Problem/Plan - 6:  ·  Problem: ESRD on hemodialysis.   ·  Plan: - Patient with hx of ESRD  - On HD TTS schedule  - admission eGFR 9  - Follow Dr. Edwards      Problem/Plan - 7:  ·  Problem: T2DM (type 2 diabetes mellitus).   ·  Plan: - Chronic stable  - On Lantus 40 qhs  - Will start on lantus 20 units qhs with LDSS given hypoglycemic episode  - Accu checks AC/HS  - Adjust insulin requirement based on blood sugar levels  - per outpatient pharmacy patient recently rx ademolog however has not yet picked up and pharmacy unsure of dose   - endocrinology consult, Dr. Perlman.     Problem/Plan - 8:  ·  Problem: Atrial fibrillation.   ·  Plan: - Patient with hx of P A.Fib  - Not on any AC because of hx of multiple falls   - on Lopressor 100mg q12 and Diltiazem 60mg q8 - will continue with hold parameters  - EKG shows NSR with left axis deviation with non specific ST and T waves changes.     Problem/Plan - 9:  ·  Problem: Benign essential HTN.   ·  Plan: - Chronic stable  - Continue Amlodipine 5 mg with hold parameters  - Dash/Renal Diet  - continue to monitor routine hemodynamics.     Problem/Plan - 10:  ·  Problem: HLD (hyperlipidemia).   ·  Plan; - Chronic stable  - On atorvastatin 40mg at home - will continue  - Dash/Renal diet.     Problem/Plan - 11:  ·  Problem: Depression, major.   ·  Plan: - Chronic stable  - Continue imipramine 50qhs.     Problem/Plan - 12:  ·  Problem: Need for prophylactic measure.   ·  Plan: - DVT Prophylaxis: Heparin 5000 units q12.

## 2022-06-24 NOTE — PROGRESS NOTE ADULT - ASSESSMENT
72 yo woman with PMH of HTN, DM2, CAD, CHF, PAD, A.fib, multiple falls and ESRD on HD was admitted on 6/16, came to ED with generalized weakness.   Her hypoglycemia and hypothermia on admission could be related to sepsis/SIRS. Source likely sec to Right hallux with a dry gangrene and superimposed infection.   TOMMY/PVR's noted.   Now S/P R fem-BK pop w/PTFE                 POD# 3  Now S/P Partial ray amputation of R foot       POD #1  Doing well. Bypass patent. RLE well perfused    #PAD Chronic limb ischemia with multilevel occlusive PAD on right.  Cont DAPT for now. Will likely need Xarelto/Eliquis with Plavix on DC for graft patency  Maintain Prevena x 5 days(dc 6/26)  Ambulation as per Podiatry post amputation 6/23    #ESRD on HD  HD as per renal    #DM uncontrolled  Lantus and SS coverage as per medicine    Discussed with Dr Miller

## 2022-06-24 NOTE — PROGRESS NOTE ADULT - PROBLEM SELECTOR PLAN 1
S/p Right hallux amputation POD1, (DOS: 6/23/22)  - s/p RLE bypass graft  - Pt seen and evaluated.  - No strikethrough to dressing. Dressing to be change at tomorrow.   - Continue IV abx per ID  - Continue vascular recs  - Continue med management   - F/o OR culture & pathology

## 2022-06-24 NOTE — PROGRESS NOTE ADULT - SUBJECTIVE AND OBJECTIVE BOX
CAPILLARY BLOOD GLUCOSE      POCT Blood Glucose.: 212 mg/dL (23 Jun 2022 21:54)  POCT Blood Glucose.: 106 mg/dL (23 Jun 2022 18:16)  POCT Blood Glucose.: 131 mg/dL (23 Jun 2022 16:20)  POCT Blood Glucose.: 288 mg/dL (23 Jun 2022 11:17)  POCT Blood Glucose.: 317 mg/dL (23 Jun 2022 07:20)      Vital Signs Last 24 Hrs  T(C): 36.7 (24 Jun 2022 04:51), Max: 36.7 (24 Jun 2022 04:51)  T(F): 98.1 (24 Jun 2022 04:51), Max: 98.1 (24 Jun 2022 04:51)  HR: 72 (24 Jun 2022 04:51) (56 - 72)  BP: 125/51 (24 Jun 2022 04:51) (99/58 - 132/67)  BP(mean): --  RR: 18 (24 Jun 2022 04:51) (13 - 18)  SpO2: 93% (24 Jun 2022 04:51) (91% - 100%)    Respiratory: CTA B/L  CV: RRR, S1S2, no murmurs, rubs or gallops  Abdominal: Soft, NT, ND +BS, Last BM  Extremities: No edema, + peripheral pulses     06-23    131<L>  |  93<L>  |  42<H>  ----------------------------<  336<H>  4.5   |  30  |  4.05<H>    Ca    8.9      23 Jun 2022 07:20  Phos  3.3     06-23  Mg     2.3     06-23    TPro  5.9<L>  /  Alb  2.5<L>  /  TBili  0.3  /  DBili  x   /  AST  17  /  ALT  20  /  AlkPhos  94  06-23      atorvastatin 40 milliGRAM(s) Oral at bedtime  dextrose 50% Injectable 25 Gram(s) IV Push once  dextrose Oral Gel 15 Gram(s) Oral once PRN  glucagon  Injectable 1 milliGRAM(s) IntraMuscular once  insulin glargine Injectable (LANTUS) 8 Unit(s) SubCutaneous at bedtime  insulin lispro (ADMELOG) corrective regimen sliding scale   SubCutaneous three times a day before meals

## 2022-06-24 NOTE — PROGRESS NOTE ADULT - ASSESSMENT
pt with hypoglycemia  elevated troponin due to renal failure  ashd , s/p mi  s/p coronary stent  s/p cabg  [id number]  esrd - on hd  dm2  hypertension  paf -not on oac - fall risk  pvd - s/p stent  dyslipidemia   chf - compensated - recent coronary angiogram - patent graft -pt's cradiac status stable low  to intermediate risk for  pvd surgery and amputation of great  toe if needed  6/21  pt tolerated rt femoral -poplitael by-pass 6/21  cardiac status stable low risk for rt big toe amputation 6/23  pt tolerated amputation of left great toe 6/23

## 2022-06-24 NOTE — PROGRESS NOTE ADULT - SUBJECTIVE AND OBJECTIVE BOX
71y year old Female seen at \[facility]\"" [address] S/p Right hallux amputation POD1, (DOS: 6/23/22). Pt doing well. Pt complaining of pain to the RLE. Pt is tolerating diet, voiding without difficulties. Pt denies any fever, chills, nausea, vomiting, chest pain, shortness of breath, or calf pain at this time.      Allergies    latex (Unknown)  No Known Drug Allergies    Intolerances        MEDICATIONS  (STANDING):  aspirin enteric coated 81 milliGRAM(s) Oral daily  atorvastatin 40 milliGRAM(s) Oral at bedtime  calcium acetate 667 milliGRAM(s) Oral three times a day with meals  cefTRIAXone   IVPB 2000 milliGRAM(s) IV Intermittent every 24 hours  chlorhexidine 4% Liquid 1 Application(s) Topical <User Schedule>  cloNIDine 0.1 milliGRAM(s) Oral two times a day  clopidogrel Tablet 75 milliGRAM(s) Oral daily  dextrose 5%. 1000 milliLiter(s) (50 mL/Hr) IV Continuous <Continuous>  dextrose 50% Injectable 25 Gram(s) IV Push once  diltiazem    Tablet 60 milliGRAM(s) Oral every 8 hours  glucagon  Injectable 1 milliGRAM(s) IntraMuscular once  heparin   Injectable 5000 Unit(s) SubCutaneous every 12 hours  imipramine 50 milliGRAM(s) Oral daily  insulin glargine Injectable (LANTUS) 8 Unit(s) SubCutaneous at bedtime  insulin lispro (ADMELOG) corrective regimen sliding scale   SubCutaneous three times a day before meals  metoprolol tartrate 100 milliGRAM(s) Oral every 12 hours  pantoprazole    Tablet 40 milliGRAM(s) Oral before breakfast    MEDICATIONS  (PRN):  acetaminophen     Tablet .. 650 milliGRAM(s) Oral every 6 hours PRN Temp greater or equal to 38C (100.4F), Mild Pain (1 - 3)  aluminum hydroxide/magnesium hydroxide/simethicone Suspension 30 milliLiter(s) Oral every 4 hours PRN Dyspepsia  dextrose Oral Gel 15 Gram(s) Oral once PRN Blood Glucose LESS THAN 70 milliGRAM(s)/deciliter  diphenhydrAMINE Injectable 25 milliGRAM(s) IV Push <User Schedule> PRN Combative behavior  melatonin 3 milliGRAM(s) Oral at bedtime PRN Insomnia      Vital Signs Last 24 Hrs  T(C): 36.7 (24 Jun 2022 14:45), Max: 36.7 (24 Jun 2022 04:51)  T(F): 98.1 (24 Jun 2022 14:45), Max: 98.1 (24 Jun 2022 04:51)  HR: 69 (24 Jun 2022 17:09) (55 - 72)  BP: 149/66 (24 Jun 2022 17:09) (101/45 - 149/66)  BP(mean): --  RR: 18 (24 Jun 2022 14:45) (18 - 18)  SpO2: 96% (24 Jun 2022 14:45) (93% - 99%)    PHYSICAL EXAM:  Vascular: Right DP & PT dopplerable.  Capillary refill (CFT) 3 seconds. Digital hair absent bilaterally.   Neurological: Light touch sensation diminished bilaterally.  Musculoskeletal: s/p right hallux ampuation. strength in all quadrants bilaterally, AJ & STJ ROM absent b/l.   Dermatological: sutures to right hallux intact. No signs of wound dehiscence. No signs of infection.     CBC Full  -  ( 24 Jun 2022 08:07 )  WBC Count : 14.26 K/uL  RBC Count : 3.24 M/uL  Hemoglobin : 9.3 g/dL  Hematocrit : 30.8 %  Platelet Count - Automated : 288 K/uL  Mean Cell Volume : 95.1 fl  Mean Cell Hemoglobin : 28.7 pg  Mean Cell Hemoglobin Concentration : 30.2 gm/dL  Auto Neutrophil # : x  Auto Lymphocyte # : x  Auto Monocyte # : x  Auto Eosinophil # : x  Auto Basophil # : x  Auto Neutrophil % : x  Auto Lymphocyte % : x  Auto Monocyte % : x  Auto Eosinophil % : x  Auto Basophil % : x      ----------CHEM PANEL----------          Culture - Tissue with Gram Stain (collected 23 Jun 2022 15:35)  Source: .Tissue Other, right hallux bone culture  Gram Stain (24 Jun 2022 04:37):    Rare polymorphonuclear leukocytes seen per low power field    Few Gram positive cocci in pairs seen per oil power field        Imaging: ----------

## 2022-06-25 DIAGNOSIS — I73.9 PERIPHERAL VASCULAR DISEASE, UNSPECIFIED: ICD-10-CM

## 2022-06-25 LAB
-  AMPICILLIN/SULBACTAM: SIGNIFICANT CHANGE UP
-  CEFAZOLIN: SIGNIFICANT CHANGE UP
-  CLINDAMYCIN: SIGNIFICANT CHANGE UP
-  ERYTHROMYCIN: SIGNIFICANT CHANGE UP
-  GENTAMICIN: SIGNIFICANT CHANGE UP
-  OXACILLIN: SIGNIFICANT CHANGE UP
-  PENICILLIN: SIGNIFICANT CHANGE UP
-  RIFAMPIN: SIGNIFICANT CHANGE UP
-  TETRACYCLINE: SIGNIFICANT CHANGE UP
-  TRIMETHOPRIM/SULFAMETHOXAZOLE: SIGNIFICANT CHANGE UP
-  VANCOMYCIN: SIGNIFICANT CHANGE UP
ANION GAP SERPL CALC-SCNC: 6 MMOL/L — SIGNIFICANT CHANGE UP (ref 5–17)
BUN SERPL-MCNC: 32 MG/DL — HIGH (ref 7–23)
CALCIUM SERPL-MCNC: 9.5 MG/DL — SIGNIFICANT CHANGE UP (ref 8.5–10.1)
CHLORIDE SERPL-SCNC: 99 MMOL/L — SIGNIFICANT CHANGE UP (ref 96–108)
CO2 SERPL-SCNC: 32 MMOL/L — HIGH (ref 22–31)
CREAT SERPL-MCNC: 3.8 MG/DL — HIGH (ref 0.5–1.3)
EGFR: 12 ML/MIN/1.73M2 — LOW
GLUCOSE SERPL-MCNC: 166 MG/DL — HIGH (ref 70–99)
HCT VFR BLD CALC: 30.3 % — LOW (ref 34.5–45)
HGB BLD-MCNC: 9.4 G/DL — LOW (ref 11.5–15.5)
MCHC RBC-ENTMCNC: 29.4 PG — SIGNIFICANT CHANGE UP (ref 27–34)
MCHC RBC-ENTMCNC: 31 GM/DL — LOW (ref 32–36)
MCV RBC AUTO: 94.7 FL — SIGNIFICANT CHANGE UP (ref 80–100)
METHOD TYPE: SIGNIFICANT CHANGE UP
NRBC # BLD: 0 /100 WBCS — SIGNIFICANT CHANGE UP (ref 0–0)
PLATELET # BLD AUTO: 294 K/UL — SIGNIFICANT CHANGE UP (ref 150–400)
POTASSIUM SERPL-MCNC: 4.8 MMOL/L — SIGNIFICANT CHANGE UP (ref 3.5–5.3)
POTASSIUM SERPL-SCNC: 4.8 MMOL/L — SIGNIFICANT CHANGE UP (ref 3.5–5.3)
RBC # BLD: 3.2 M/UL — LOW (ref 3.8–5.2)
RBC # FLD: 16.9 % — HIGH (ref 10.3–14.5)
SODIUM SERPL-SCNC: 137 MMOL/L — SIGNIFICANT CHANGE UP (ref 135–145)
WBC # BLD: 14.03 K/UL — HIGH (ref 3.8–10.5)
WBC # FLD AUTO: 14.03 K/UL — HIGH (ref 3.8–10.5)

## 2022-06-25 PROCEDURE — 99233 SBSQ HOSP IP/OBS HIGH 50: CPT

## 2022-06-25 PROCEDURE — 99232 SBSQ HOSP IP/OBS MODERATE 35: CPT

## 2022-06-25 RX ORDER — INSULIN GLARGINE 100 [IU]/ML
10 INJECTION, SOLUTION SUBCUTANEOUS AT BEDTIME
Refills: 0 | Status: DISCONTINUED | OUTPATIENT
Start: 2022-06-25 | End: 2022-06-26

## 2022-06-25 RX ADMIN — Medication 667 MILLIGRAM(S): at 08:22

## 2022-06-25 RX ADMIN — Medication 60 MILLIGRAM(S): at 05:41

## 2022-06-25 RX ADMIN — Medication 50 MILLIGRAM(S): at 11:32

## 2022-06-25 RX ADMIN — Medication 6: at 17:11

## 2022-06-25 RX ADMIN — Medication 0.1 MILLIGRAM(S): at 05:41

## 2022-06-25 RX ADMIN — Medication 667 MILLIGRAM(S): at 11:32

## 2022-06-25 RX ADMIN — Medication 1: at 08:21

## 2022-06-25 RX ADMIN — Medication 60 MILLIGRAM(S): at 21:57

## 2022-06-25 RX ADMIN — Medication 81 MILLIGRAM(S): at 11:32

## 2022-06-25 RX ADMIN — INSULIN GLARGINE 10 UNIT(S): 100 INJECTION, SOLUTION SUBCUTANEOUS at 22:03

## 2022-06-25 RX ADMIN — Medication 100 MILLIGRAM(S): at 17:42

## 2022-06-25 RX ADMIN — ATORVASTATIN CALCIUM 40 MILLIGRAM(S): 80 TABLET, FILM COATED ORAL at 22:00

## 2022-06-25 RX ADMIN — Medication 100 MILLIGRAM(S): at 05:44

## 2022-06-25 RX ADMIN — Medication 667 MILLIGRAM(S): at 17:29

## 2022-06-25 RX ADMIN — Medication 6: at 14:07

## 2022-06-25 RX ADMIN — PANTOPRAZOLE SODIUM 40 MILLIGRAM(S): 20 TABLET, DELAYED RELEASE ORAL at 05:41

## 2022-06-25 RX ADMIN — CLOPIDOGREL BISULFATE 75 MILLIGRAM(S): 75 TABLET, FILM COATED ORAL at 14:54

## 2022-06-25 RX ADMIN — Medication 60 MILLIGRAM(S): at 14:54

## 2022-06-25 RX ADMIN — Medication 0.1 MILLIGRAM(S): at 17:29

## 2022-06-25 RX ADMIN — HEPARIN SODIUM 5000 UNIT(S): 5000 INJECTION INTRAVENOUS; SUBCUTANEOUS at 17:30

## 2022-06-25 RX ADMIN — CEFTRIAXONE 100 MILLIGRAM(S): 500 INJECTION, POWDER, FOR SOLUTION INTRAMUSCULAR; INTRAVENOUS at 17:30

## 2022-06-25 RX ADMIN — HEPARIN SODIUM 5000 UNIT(S): 5000 INJECTION INTRAVENOUS; SUBCUTANEOUS at 05:44

## 2022-06-25 RX ADMIN — CHLORHEXIDINE GLUCONATE 1 APPLICATION(S): 213 SOLUTION TOPICAL at 05:40

## 2022-06-25 NOTE — PROGRESS NOTE ADULT - SUBJECTIVE AND OBJECTIVE BOX
Patient is a 71y Female whom presented to the hospital with esrd on hd     PAST MEDICAL & SURGICAL HISTORY:  Diabetes mellitus II      HTN (hypertension)      h/o Anxiety attack      Depression      h/o Myocardial infarct 2007      CAD (coronary artery disease)      h/o Hepatitis A 1969  currently resolved, no symptoms      PAD (peripheral artery disease)      Murmur, cardiac      h/o Smoking  quitted 3/2012      CRF (chronic renal failure), unspecified stage      Dialysis patient      Anemia secondary to renal failure      HTN (hypertension)      coronary stent 2007      s/p Ovarian cyst removal      s/p surgical removal of benign Skin lesion epigastric area          MEDICATIONS  (STANDING):  amLODIPine   Tablet 5 milliGRAM(s) Oral daily  aspirin enteric coated 81 milliGRAM(s) Oral daily  atorvastatin 40 milliGRAM(s) Oral at bedtime  calcium acetate 667 milliGRAM(s) Oral three times a day with meals  cefTRIAXone   IVPB 1000 milliGRAM(s) IV Intermittent every 24 hours  cloNIDine 0.1 milliGRAM(s) Oral two times a day  clopidogrel Tablet 75 milliGRAM(s) Oral daily  dextrose 5%. 1000 milliLiter(s) (100 mL/Hr) IV Continuous <Continuous>  dextrose 5%. 1000 milliLiter(s) (50 mL/Hr) IV Continuous <Continuous>  dextrose 50% Injectable 25 Gram(s) IV Push once  dextrose 50% Injectable 12.5 Gram(s) IV Push once  dextrose 50% Injectable 25 Gram(s) IV Push once  diltiazem    Tablet 60 milliGRAM(s) Oral every 8 hours  glucagon  Injectable 1 milliGRAM(s) IntraMuscular once  heparin   Injectable 5000 Unit(s) SubCutaneous every 12 hours  imipramine 50 milliGRAM(s) Oral daily  insulin glargine Injectable (LANTUS) 12 Unit(s) SubCutaneous at bedtime  insulin lispro (ADMELOG) corrective regimen sliding scale   SubCutaneous three times a day before meals  insulin lispro (ADMELOG) corrective regimen sliding scale   SubCutaneous at bedtime  metoprolol tartrate 100 milliGRAM(s) Oral every 12 hours  pantoprazole    Tablet 40 milliGRAM(s) Oral before breakfast  povidone iodine 10% Solution 1 Application(s) Topical daily      Allergies    latex (Unknown)  No Known Drug Allergies    Intolerances        SOCIAL HISTORY:  Denies ETOh,Smoking,     FAMILY HISTORY:  No pertinent family history in first degree relatives        REVIEW OF SYSTEMS:    CONSTITUTIONAL: No weakness, fevers or chills  RESPIRATORY: No cough, wheezing, hemoptysis; No shortness of breath  CARDIOVASCULAR: No chest pain or palpitations  GASTROINTESTINAL: No abdominal or epigastric pain. No nausea, vomiting,     No diarrhea or constipation. No melena   SKIN: dry                                                                      9.4    14.03 )-----------( 294      ( 25 Jun 2022 06:05 )             30.3       CBC Full  -  ( 25 Jun 2022 06:05 )  WBC Count : 14.03 K/uL  RBC Count : 3.20 M/uL  Hemoglobin : 9.4 g/dL  Hematocrit : 30.3 %  Platelet Count - Automated : 294 K/uL  Mean Cell Volume : 94.7 fl  Mean Cell Hemoglobin : 29.4 pg  Mean Cell Hemoglobin Concentration : 31.0 gm/dL  Auto Neutrophil # : x  Auto Lymphocyte # : x  Auto Monocyte # : x  Auto Eosinophil # : x  Auto Basophil # : x  Auto Neutrophil % : x  Auto Lymphocyte % : x  Auto Monocyte % : x  Auto Eosinophil % : x  Auto Basophil % : x      06-25    137  |  99  |  32<H>  ----------------------------<  166<H>  4.8   |  32<H>  |  3.80<H>    Ca    9.5      25 Jun 2022 06:05        CAPILLARY BLOOD GLUCOSE      POCT Blood Glucose.: 173 mg/dL (25 Jun 2022 08:02)  POCT Blood Glucose.: 190 mg/dL (24 Jun 2022 21:32)  POCT Blood Glucose.: 270 mg/dL (24 Jun 2022 16:53)  POCT Blood Glucose.: 236 mg/dL (24 Jun 2022 14:31)  POCT Blood Glucose.: 193 mg/dL (24 Jun 2022 13:16)  POCT Blood Glucose.: 213 mg/dL (24 Jun 2022 11:38)      Vital Signs Last 24 Hrs  T(C): 36.5 (25 Jun 2022 04:39), Max: 37.2 (24 Jun 2022 20:17)  T(F): 97.7 (25 Jun 2022 04:39), Max: 98.9 (24 Jun 2022 20:17)  HR: 69 (25 Jun 2022 04:39) (57 - 69)  BP: 135/64 (25 Jun 2022 04:39) (122/45 - 149/66)  BP(mean): --  RR: 18 (25 Jun 2022 04:39) (18 - 18)  SpO2: 98% (25 Jun 2022 04:39) (96% - 99%)            PHYSICAL EXAM:    Constitutional: NAD  HEENT: conjunctive   clear   Neck:  No JVD  Respiratory: CTAB  Cardiovascular: S1 and S2  Gastrointestinal: BS+, soft, NT/ND  Skin: dry

## 2022-06-25 NOTE — PROGRESS NOTE ADULT - PROBLEM SELECTOR PLAN 1
S/p Right hallux amputation POD1, (DOS: 6/23/22)  - s/p RLE bypass graft  - Pt seen and evaluated.  -  Dressing changed with acticoat and DSD  - Continue IV abx per ID  - Continue vascular recs  - Continue med management   - F/o OR culture & pathology

## 2022-06-25 NOTE — PROGRESS NOTE ADULT - SUBJECTIVE AND OBJECTIVE BOX
Patient is a 71y Female with a known history of :  SIRS (systemic inflammatory response syndrome) [R65.10]    Hypoglycemia [E16.2]    Pleural effusion [J90]    CHF, acute [I50.9]    Chronic CHF [I50.9]    Elevated troponin [R77.8]    Atrial fibrillation [I48.91]    Benign essential HTN [I10]    HLD (hyperlipidemia) [E78.5]    Depression, major [F32.9]    Need for prophylactic measure [Z29.9]    ESRD on hemodialysis [N18.6]    T2DM (type 2 diabetes mellitus) [E11.9]    HFrEF (heart failure with reduced ejection fraction) [I50.20]    Gangrene of toe of right foot [I96]    Atherosclerotic PVD with ulceration [I70.209]      HPI:  Patient is a 71 year old female with PMH of A.fib rate controlled not on AC for hx of multiple falls, T2DM, HTN, HLD, ESRD on HD, CHF, s/p CABG brought in by EMS for generalized weakness at home. Patient states that she was doing well when in the afternoon she tried to get up from her couch and felt weak in the LE and fell back in her couch. Denies any hx of head trauma or loss of consciousness. Denies any weakness or numbness. Denies any chest pain, SOB or palpitations. No fever, cough or chills. No hx of recent travel or sick contacts. At the time of EMS arrival patient was found to have POCBS of 50. EMS gave dextrose and on arrival to the ED patient blood sugar was found to be in 30's with hypothermia of 94.9.    ED course:   Vitals:   BP: 176/85  HR:76  Temp:94.2  RR:18, O2:96% on RA   Significant Labs:   CBC: WBC:16.82 Hb:13.2 , Platlets: 260, Neutro:89   CMP: Lytes: WNL, BUN/Creatinine:48/4.8, Glucose:134 , Alkaline Phos:128, AST, 41, eGFR: 9, HsTrop: 275.9, ProBNP: 120214, Lactate: 2  UA; negative  CXR: Heart and lung appear normal (self read)  CT BRAIN: No acute intracranial bleeding. Central volume loss, chronic microvascular ischemic changes, and chronic bilateral lacunar infarctions.  CT CERVICAL SPINE: No fracture. Grade 1 C4-C5 anterolisthesis. C6-C7 disc degeneration. Bilateral pleural effusions and interlobular septal thickening due to   interstitial edema.  EKG: NSR, left axis deviation, HR 71,   QT/QTc: 426/462  Patient received: Ceftriaxone 1 g x1, Dextrose 5% + NS 1L x1, Dextrose 50% IV X1, heating blanket, 2L NC   (16 Jun 2022 01:19)      REVIEW OF SYSTEMS:    CONSTITUTIONAL: No fever, weight loss, or fatigue  EYES: No eye pain, visual disturbances, or discharge  ENMT:  No difficulty hearing, tinnitus, vertigo; No sinus or throat pain  NECK: No pain or stiffness  BREASTS: No pain, masses, or nipple discharge  RESPIRATORY: No cough, wheezing, chills or hemoptysis; No shortness of breath  CARDIOVASCULAR: No chest pain, palpitations, dizziness, or leg swelling  GASTROINTESTINAL: No abdominal or epigastric pain. No nausea, vomiting, or hematemesis; No diarrhea or constipation. No melena or hematochezia.  GENITOURINARY: No dysuria, frequency, hematuria, or incontinence  NEUROLOGICAL: No headaches, memory loss, loss of strength, numbness, or tremors  SKIN: No itching, burning, rashes, or lesions   LYMPH NODES: No enlarged glands  ENDOCRINE: No heat or cold intolerance; No hair loss  MUSCULOSKELETAL: No joint pain or swelling; No muscle, back, or extremity pain  PSYCHIATRIC: No depression, anxiety, mood swings, or difficulty sleeping  HEME/LYMPH: No easy bruising, or bleeding gums  ALLERGY AND IMMUNOLOGIC: No hives or eczema    MEDICATIONS  (STANDING):  aspirin enteric coated 81 milliGRAM(s) Oral daily  atorvastatin 40 milliGRAM(s) Oral at bedtime  calcium acetate 667 milliGRAM(s) Oral three times a day with meals  cefTRIAXone   IVPB 2000 milliGRAM(s) IV Intermittent every 24 hours  chlorhexidine 4% Liquid 1 Application(s) Topical <User Schedule>  cloNIDine 0.1 milliGRAM(s) Oral two times a day  clopidogrel Tablet 75 milliGRAM(s) Oral daily  dextrose 5%. 1000 milliLiter(s) (50 mL/Hr) IV Continuous <Continuous>  dextrose 50% Injectable 25 Gram(s) IV Push once  diltiazem    Tablet 60 milliGRAM(s) Oral every 8 hours  glucagon  Injectable 1 milliGRAM(s) IntraMuscular once  heparin   Injectable 5000 Unit(s) SubCutaneous every 12 hours  imipramine 50 milliGRAM(s) Oral daily  insulin glargine Injectable (LANTUS) 8 Unit(s) SubCutaneous at bedtime  insulin lispro (ADMELOG) corrective regimen sliding scale   SubCutaneous three times a day before meals  metoprolol tartrate 100 milliGRAM(s) Oral every 12 hours  pantoprazole    Tablet 40 milliGRAM(s) Oral before breakfast    MEDICATIONS  (PRN):  acetaminophen     Tablet .. 650 milliGRAM(s) Oral every 6 hours PRN Temp greater or equal to 38C (100.4F), Mild Pain (1 - 3)  aluminum hydroxide/magnesium hydroxide/simethicone Suspension 30 milliLiter(s) Oral every 4 hours PRN Dyspepsia  dextrose Oral Gel 15 Gram(s) Oral once PRN Blood Glucose LESS THAN 70 milliGRAM(s)/deciliter  diphenhydrAMINE Injectable 25 milliGRAM(s) IV Push <User Schedule> PRN Combative behavior  melatonin 3 milliGRAM(s) Oral at bedtime PRN Insomnia      ALLERGIES: latex (Unknown)  No Known Drug Allergies      FAMILY HISTORY:  No pertinent family history in first degree relatives        PHYSICAL EXAMINATION:  -----------------------------  T(C): 36.5 (06-25-22 @ 04:39), Max: 37.2 (06-24-22 @ 20:17)  HR: 69 (06-25-22 @ 04:39) (55 - 69)  BP: 135/64 (06-25-22 @ 04:39) (101/45 - 149/66)  RR: 18 (06-25-22 @ 04:39) (18 - 18)  SpO2: 98% (06-25-22 @ 04:39) (96% - 99%)  Wt(kg): --    06-24 @ 07:01  -  06-25 @ 07:00  --------------------------------------------------------  IN:  Total IN: 0 mL    OUT:    Other (mL): 2000 mL  Total OUT: 2000 mL    Total NET: -2000 mL            VITALS  T(C): 36.5 (06-25-22 @ 04:39), Max: 37.2 (06-24-22 @ 20:17)  HR: 69 (06-25-22 @ 04:39) (55 - 69)  BP: 135/64 (06-25-22 @ 04:39) (101/45 - 149/66)  RR: 18 (06-25-22 @ 04:39) (18 - 18)  SpO2: 98% (06-25-22 @ 04:39) (96% - 99%)    Constitutional: well developed, normal appearance, well groomed, well nourished, no deformities and no acute distress.   Eyes: the conjunctiva exhibited no abnormalities and the eyelids demonstrated no xanthelasmas.   HEENT: normal oral mucosa, no oral pallor and no oral cyanosis.   Neck: normal jugular venous A waves present, normal jugular venous V waves present and no jugular venous wilson A waves.   Pulmonary: no respiratory distress, normal respiratory rhythm and effort, no accessory muscle use and lungs were clear to auscultation bilaterally.   Cardiovascular: heart rate and rhythm were normal, normal S1 and S2 and no murmur, gallop, rub, heave or thrill are present.   Abdomen: soft, non-tender, no hepato-splenomegaly and no abdominal mass palpated.   Musculoskeletal: the gait could not be assessed..   Extremities: no clubbing of the fingernails, no localized cyanosis, no petechial hemorrhages and no ischemic changes.   Skin: normal skin color and pigmentation, no rash, no venous stasis, no skin lesions, no skin ulcer and no xanthoma was observed.   Psychiatric: oriented to person, place, and time, the affect was normal, the mood was normal and not feeling anxious.     LABS:   --------  06-24    133<L>  |  96  |  52<H>  ----------------------------<  267<H>  4.7   |  29  |  5.10<H>    Ca    8.7      24 Jun 2022 08:07  Phos  3.3     06-23  Mg     2.3     06-23    TPro  5.9<L>  /  Alb  2.5<L>  /  TBili  0.3  /  DBili  x   /  AST  17  /  ALT  20  /  AlkPhos  94  06-23                         9.3    14.26 )-----------( 288      ( 24 Jun 2022 08:07 )             30.8             Culture Results:   Few Staphylococcus aureus (06-23 @ 15:35)      RADIOLOGY:  -----------------    ECG:     ECHO:

## 2022-06-25 NOTE — PROGRESS NOTE ADULT - PROBLEM SELECTOR PLAN 1
- Continue prevena vac, likely dc tomorrow 6/26  - Continue ASA/plavix, SQH  - Will likely need Xarelto/Eliquis with Plavix on DC for graft patency  - Elevation of RLE  - Ambulation per podiatry  - To be discussed with Dr. Miller - Continue prevena vac, likely dc tomorrow 6/26  - Continue ASA/plavix, SQH  - Elevation of RLE  - Ambulation per podiatry  - To be discussed with Dr. Miller - Continue prevena vac, likely dc tomorrow 6/26  - Continue ASA/plavix, SQH. Patient history of falls may preclude her from receiving full AC  - Elevation of RLE  - Ambulation per podiatry  - Case discussed with Dr. Miller

## 2022-06-25 NOTE — PROGRESS NOTE ADULT - SUBJECTIVE AND OBJECTIVE BOX
[facility]  INFECTIOUS DISEASES - [address], [place] [address]  Tel: [phone]     Fax: [phone]  =======================================================    SHILO KOHLER [id number]    Follow up: No fevers. Has sone pain on right lower leg and foot. Denies any SOB, nausea or diarrhea.    Allergies:  latex (Unknown)  No Known Drug Allergies      Antibiotics:  acetaminophen     Tablet .. 650 milliGRAM(s) Oral every 6 hours PRN  aluminum hydroxide/magnesium hydroxide/simethicone Suspension 30 milliLiter(s) Oral every 4 hours PRN  aspirin enteric coated 81 milliGRAM(s) Oral daily  atorvastatin 40 milliGRAM(s) Oral at bedtime  calcium acetate 667 milliGRAM(s) Oral three times a day with meals  cefTRIAXone   IVPB 2000 milliGRAM(s) IV Intermittent every 24 hours  chlorhexidine 4% Liquid 1 Application(s) Topical <User Schedule>  cloNIDine 0.1 milliGRAM(s) Oral two times a day  clopidogrel Tablet 75 milliGRAM(s) Oral daily  dextrose 5%. 1000 milliLiter(s) IV Continuous <Continuous>  dextrose 50% Injectable 25 Gram(s) IV Push once  dextrose Oral Gel 15 Gram(s) Oral once PRN  diltiazem    Tablet 60 milliGRAM(s) Oral every 8 hours  diphenhydrAMINE Injectable 25 milliGRAM(s) IV Push <User Schedule> PRN  glucagon  Injectable 1 milliGRAM(s) IntraMuscular once  heparin   Injectable 5000 Unit(s) SubCutaneous every 12 hours  imipramine 50 milliGRAM(s) Oral daily  insulin glargine Injectable (LANTUS) 8 Unit(s) SubCutaneous at bedtime  insulin lispro (ADMELOG) corrective regimen sliding scale   SubCutaneous three times a day before meals  melatonin 3 milliGRAM(s) Oral at bedtime PRN  metoprolol tartrate 100 milliGRAM(s) Oral every 12 hours  pantoprazole    Tablet 40 milliGRAM(s) Oral before breakfast       REVIEW OF SYSTEMS:  CONSTITUTIONAL:  No Fever or chills  HEENT:   No sore throat or runny nose.  CARDIOVASCULAR:  No chest pain or SOB  RESPIRATORY:  No cough, shortness of breath  GASTROINTESTINAL:  No nausea, vomiting or diarrhea.  NEUROLOGIC:  No headache, no dizziness  PSYCHIATRIC:  No disorder of thought or mood.       Physical Exam:  ICU Vital Signs Last 24 Hrs  T(C): 36.5 (25 Jun 2022 04:39), Max: 37.2 (24 Jun 2022 20:17)  T(F): 97.7 (25 Jun 2022 04:39), Max: 98.9 (24 Jun 2022 20:17)  HR: 69 (25 Jun 2022 04:39) (57 - 69)  BP: 135/64 (25 Jun 2022 04:39) (122/45 - 149/66)  BP(mean): --  ABP: --  ABP(mean): --  RR: 18 (25 Jun 2022 04:39) (18 - 18)  SpO2: 98% (25 Jun 2022 04:39) (96% - 99%)     GEN: NAD  HEENT: normocephalic and atraumatic.   NECK: Supple.    LUNGS: Normal respiratory effort  HEART: Regular rate and rhythm   ABDOMEN: Soft, nontender, and nondistended.  EXTREMITIES: No leg edema. R foot covered with dressing  MSK: no knee swelling  NEUROLOGIC: Answering questions appropriately  PSYCHIATRIC: Appropriate affect .    Labs:  06-25    137  |  99  |  32<H>  ----------------------------<  166<H>  4.8   |  32<H>  |  3.80<H>    Ca    9.5      25 Jun 2022 06:05                            9.4    14.03 )-----------( 294      ( 25 Jun 2022 06:05 )             30.3             RECENT CULTURES:  06-23 @ 15:35 .Tissue Other, right hallux bone culture     Few Staphylococcus aureus    Rare polymorphonuclear leukocytes seen per low power field  Few Gram positive cocci in pairs seen per oil power field      06-16 @ 03:40 Clean Catch Clean Catch (Midstream)     <10,000 CFU/mL Normal Urogenital Shantel        06-16 @ 03:38 .Blood Blood-Peripheral     No Growth Final        06-15 @ 22:44          NotDete        All imaging and data are reviewed.

## 2022-06-25 NOTE — PROGRESS NOTE ADULT - ASSESSMENT
71 year old female with PMH of HTN, DM2, CAD, CHF, PAD, Afib, multiple falls and ESRD on HD with Right hallux dry gangrene, POD#4 s/p R fem-BK pop w/PTFE, POD#2 s/p partial ray amputation of R foot.

## 2022-06-25 NOTE — PROGRESS NOTE ADULT - SUBJECTIVE AND OBJECTIVE BOX
SUBJECTIVE:  Patient seen and examined at bedside.  No overnight events.  Patient reports Right foot pain as well as pain along incision.    VITALS  Vital Signs Last 24 Hrs  T(C): 36.5 (25 Jun 2022 04:39), Max: 37.2 (24 Jun 2022 20:17)  T(F): 97.7 (25 Jun 2022 04:39), Max: 98.9 (24 Jun 2022 20:17)  HR: 69 (25 Jun 2022 04:39) (57 - 69)  BP: 135/64 (25 Jun 2022 04:39) (122/45 - 149/66)  RR: 18 (25 Jun 2022 04:39) (18 - 18)  SpO2: 98% (25 Jun 2022 04:39) (96% - 99%)    PHYSICAL EXAM  GENERAL:  Elderly female lying comfortably in bed in NAD.  HEENT:  NC/AT. Sclera white.  EXTREMITIES: Dressing on Right foot clean/dry. Prevena in place along RLE incision with good seal. Approximately 5x10cm ecchymotic area noted on Right medial thigh. PT pulse identifiable via doppler.  SKIN:  No jaundice, pallor, or cyanosis  NEURO:  Alert    MEDICATIONS  MEDICATIONS  (STANDING):  aspirin enteric coated 81 milliGRAM(s) Oral daily  atorvastatin 40 milliGRAM(s) Oral at bedtime  calcium acetate 667 milliGRAM(s) Oral three times a day with meals  cefTRIAXone   IVPB 2000 milliGRAM(s) IV Intermittent every 24 hours  chlorhexidine 4% Liquid 1 Application(s) Topical <User Schedule>  cloNIDine 0.1 milliGRAM(s) Oral two times a day  clopidogrel Tablet 75 milliGRAM(s) Oral daily  dextrose 5%. 1000 milliLiter(s) (50 mL/Hr) IV Continuous <Continuous>  dextrose 50% Injectable 25 Gram(s) IV Push once  diltiazem    Tablet 60 milliGRAM(s) Oral every 8 hours  glucagon  Injectable 1 milliGRAM(s) IntraMuscular once  heparin   Injectable 5000 Unit(s) SubCutaneous every 12 hours  imipramine 50 milliGRAM(s) Oral daily  insulin glargine Injectable (LANTUS) 8 Unit(s) SubCutaneous at bedtime  insulin lispro (ADMELOG) corrective regimen sliding scale   SubCutaneous three times a day before meals  metoprolol tartrate 100 milliGRAM(s) Oral every 12 hours  pantoprazole    Tablet 40 milliGRAM(s) Oral before breakfast    MEDICATIONS  (PRN):  acetaminophen     Tablet .. 650 milliGRAM(s) Oral every 6 hours PRN Temp greater or equal to 38C (100.4F), Mild Pain (1 - 3)  aluminum hydroxide/magnesium hydroxide/simethicone Suspension 30 milliLiter(s) Oral every 4 hours PRN Dyspepsia  dextrose Oral Gel 15 Gram(s) Oral once PRN Blood Glucose LESS THAN 70 milliGRAM(s)/deciliter  diphenhydrAMINE Injectable 25 milliGRAM(s) IV Push <User Schedule> PRN Combative behavior  melatonin 3 milliGRAM(s) Oral at bedtime PRN Insomnia    LABS:                  9.4    14.03 )-----------( 294      ( 25 Jun 2022 06:05 )             30.3     06-25    137  |  99  |  32<H>  ----------------------------<  166<H>  4.8   |  32<H>  |  3.80<H>    Ca    9.5      25 Jun 2022 06:05    Culture - Tissue with Gram Stain (collected 23 Jun 2022 15:35)  Source: .Tissue Other, right hallux bone culture  Gram Stain (24 Jun 2022 04:37):    Rare polymorphonuclear leukocytes seen per low power field    Few Gram positive cocci in pairs seen per oil power field  Preliminary Report (24 Jun 2022 21:52):    Few Staphylococcus aureus  SUBJECTIVE:  Patient seen and examined at bedside.  No overnight events.  Patient reports mild pain along RLE incision, but most of her pain is from the Right foot. However does not feel she needs pain medication at this time.    VITALS  Vital Signs Last 24 Hrs  T(C): 36.5 (25 Jun 2022 04:39), Max: 37.2 (24 Jun 2022 20:17)  T(F): 97.7 (25 Jun 2022 04:39), Max: 98.9 (24 Jun 2022 20:17)  HR: 69 (25 Jun 2022 04:39) (57 - 69)  BP: 135/64 (25 Jun 2022 04:39) (122/45 - 149/66)  RR: 18 (25 Jun 2022 04:39) (18 - 18)  SpO2: 98% (25 Jun 2022 04:39) (96% - 99%)    PHYSICAL EXAM  GENERAL:  Elderly female lying comfortably in bed in [facility].  HEENT:  NC/AT. Sclera white.  EXTREMITIES: Dressing on Right foot clean/dry. Prevena in place along RLE incision with good seal. Approximately 5x10cm ecchymotic area noted on Right medial thigh. PT/AT pulse identifiable via doppler.  SKIN:  No jaundice, pallor, or cyanosis  NEURO:  Alert    MEDICATIONS  MEDICATIONS  (STANDING):  aspirin enteric coated 81 milliGRAM(s) Oral daily  atorvastatin 40 milliGRAM(s) Oral at bedtime  calcium acetate 667 milliGRAM(s) Oral three times a day with meals  cefTRIAXone   IVPB 2000 milliGRAM(s) IV Intermittent every 24 hours  chlorhexidine 4% Liquid 1 Application(s) Topical <User Schedule>  cloNIDine 0.1 milliGRAM(s) Oral two times a day  clopidogrel Tablet 75 milliGRAM(s) Oral daily  dextrose 5%. 1000 milliLiter(s) (50 mL/Hr) IV Continuous <Continuous>  dextrose 50% Injectable 25 Gram(s) IV Push once  diltiazem    Tablet 60 milliGRAM(s) Oral every 8 hours  glucagon  Injectable 1 milliGRAM(s) IntraMuscular once  heparin   Injectable 5000 Unit(s) SubCutaneous every 12 hours  imipramine 50 milliGRAM(s) Oral daily  insulin glargine Injectable (LANTUS) 8 Unit(s) SubCutaneous at bedtime  insulin lispro (ADMELOG) corrective regimen sliding scale   SubCutaneous three times a day before meals  metoprolol tartrate 100 milliGRAM(s) Oral every 12 hours  pantoprazole    Tablet 40 milliGRAM(s) Oral before breakfast    MEDICATIONS  (PRN):  acetaminophen     Tablet .. 650 milliGRAM(s) Oral every 6 hours PRN Temp greater or equal to 38C (100.4F), Mild Pain (1 - 3)  aluminum hydroxide/magnesium hydroxide/simethicone Suspension 30 milliLiter(s) Oral every 4 hours PRN Dyspepsia  dextrose Oral Gel 15 Gram(s) Oral once PRN Blood Glucose LESS THAN 70 milliGRAM(s)/deciliter  diphenhydrAMINE Injectable 25 milliGRAM(s) IV Push <User Schedule> PRN Combative behavior  melatonin 3 milliGRAM(s) Oral at bedtime PRN Insomnia    LABS:                  9.4    14.03 )-----------( 294      ( 25 Jun 2022 06:05 )             30.3     06-25    137  |  99  |  32<H>  ----------------------------<  166<H>  4.8   |  32<H>  |  3.80<H>    Ca    9.5      25 Jun 2022 06:05    Culture - Tissue with Gram Stain (collected 23 Jun 2022 15:35)  Source: .Tissue Other, right hallux bone culture  Gram Stain (24 Jun 2022 04:37):    Rare polymorphonuclear leukocytes seen per low power field    Few Gram positive cocci in pairs seen per oil power field  Preliminary Report (24 Jun 2022 21:52):    Few Staphylococcus aureus

## 2022-06-25 NOTE — PROGRESS NOTE ADULT - SUBJECTIVE AND OBJECTIVE BOX
71y year old Female seen at \[facility]\"" 2EAS 217 W1 S/p Right hallux amputation POD2, (DOS: 6/23/22). Pt doing well. Pt complaining of pain to the RLE. Pt is tolerating diet, voiding without difficulties. Pt denies any fever, chills, nausea, vomiting, chest pain, shortness of breath, or calf pain at this time.      Allergies    latex (Unknown)  No Known Drug Allergies    Intolerances        MEDICATIONS  (STANDING):  aspirin enteric coated 81 milliGRAM(s) Oral daily  atorvastatin 40 milliGRAM(s) Oral at bedtime  calcium acetate 667 milliGRAM(s) Oral three times a day with meals  cefTRIAXone   IVPB 2000 milliGRAM(s) IV Intermittent every 24 hours  chlorhexidine 4% Liquid 1 Application(s) Topical <User Schedule>  cloNIDine 0.1 milliGRAM(s) Oral two times a day  clopidogrel Tablet 75 milliGRAM(s) Oral daily  dextrose 5%. 1000 milliLiter(s) (50 mL/Hr) IV Continuous <Continuous>  dextrose 50% Injectable 25 Gram(s) IV Push once  diltiazem    Tablet 60 milliGRAM(s) Oral every 8 hours  glucagon  Injectable 1 milliGRAM(s) IntraMuscular once  heparin   Injectable 5000 Unit(s) SubCutaneous every 12 hours  imipramine 50 milliGRAM(s) Oral daily  insulin glargine Injectable (LANTUS) 8 Unit(s) SubCutaneous at bedtime  insulin lispro (ADMELOG) corrective regimen sliding scale   SubCutaneous three times a day before meals  metoprolol tartrate 100 milliGRAM(s) Oral every 12 hours  pantoprazole    Tablet 40 milliGRAM(s) Oral before breakfast    MEDICATIONS  (PRN):  acetaminophen     Tablet .. 650 milliGRAM(s) Oral every 6 hours PRN Temp greater or equal to 38C (100.4F), Mild Pain (1 - 3)  aluminum hydroxide/magnesium hydroxide/simethicone Suspension 30 milliLiter(s) Oral every 4 hours PRN Dyspepsia  dextrose Oral Gel 15 Gram(s) Oral once PRN Blood Glucose LESS THAN 70 milliGRAM(s)/deciliter  diphenhydrAMINE Injectable 25 milliGRAM(s) IV Push <User Schedule> PRN Combative behavior  melatonin 3 milliGRAM(s) Oral at bedtime PRN Insomnia      ICU Vital Signs Last 24 Hrs  T(C): 36.8 (25 Jun 2022 13:50), Max: 37.2 (24 Jun 2022 20:17)  T(F): 98.2 (25 Jun 2022 13:50), Max: 98.9 (24 Jun 2022 20:17)  HR: 70 (25 Jun 2022 13:50) (62 - 70)  BP: 129/57 (25 Jun 2022 13:50) (122/45 - 149/66)  BP(mean): --  ABP: --  ABP(mean): --  RR: 19 (25 Jun 2022 13:50) (18 - 19)  SpO2: 100% (25 Jun 2022 13:50) (98% - 100%)      PHYSICAL EXAM:  Vascular: Right DP & PT dopplerable.  Capillary refill (CFT) 3 seconds. Digital hair absent bilaterally.   Neurological: Light touch sensation diminished bilaterally.  Musculoskeletal: s/p right hallux ampuation. strength in all quadrants bilaterally, AJ & STJ ROM absent b/l.   Dermatological: sutures to right hallux intact. No signs of wound dehiscence. No signs of infection.     CBC Full  -  ( 24 Jun 2022 08:07 )  WBC Count : 14.26 K/uL  RBC Count : 3.24 M/uL  Hemoglobin : 9.3 g/dL  Hematocrit : 30.8 %  Platelet Count - Automated : 288 K/uL  Mean Cell Volume : 95.1 fl  Mean Cell Hemoglobin : 28.7 pg  Mean Cell Hemoglobin Concentration : 30.2 gm/dL  Auto Neutrophil # : x  Auto Lymphocyte # : x  Auto Monocyte # : x  Auto Eosinophil # : x  Auto Basophil # : x  Auto Neutrophil % : x  Auto Lymphocyte % : x  Auto Monocyte % : x  Auto Eosinophil % : x  Auto Basophil % : x      ----------CHEM PANEL----------          Culture - Tissue with Gram Stain (collected 23 Jun 2022 15:35)  Source: .Tissue Other, right hallux bone culture  Gram Stain (24 Jun 2022 04:37):    Rare polymorphonuclear leukocytes seen per low power field    Few Gram positive cocci in pairs seen per oil power field        Imaging: ----------

## 2022-06-25 NOTE — PROGRESS NOTE ADULT - SUBJECTIVE AND OBJECTIVE BOX
Patient is a 71y old  Female who presents with a chief complaint of SIRS. S/P R fem-BK pop w/PTFE POD# 4, S/P Partial ray amputation of R foot  POD #2    Subjective: patient is lethargic this AM but arousable. She reports pain in RLE but is manageable. She otherwise has no complaints.    MEDICATIONS  (STANDING):  aspirin enteric coated 81 milliGRAM(s) Oral daily  atorvastatin 40 milliGRAM(s) Oral at bedtime  calcium acetate 667 milliGRAM(s) Oral three times a day with meals  cefTRIAXone   IVPB 2000 milliGRAM(s) IV Intermittent every 24 hours  chlorhexidine 4% Liquid 1 Application(s) Topical <User Schedule>  cloNIDine 0.1 milliGRAM(s) Oral two times a day  clopidogrel Tablet 75 milliGRAM(s) Oral daily  dextrose 5%. 1000 milliLiter(s) (50 mL/Hr) IV Continuous <Continuous>  dextrose 50% Injectable 25 Gram(s) IV Push once  diltiazem    Tablet 60 milliGRAM(s) Oral every 8 hours  glucagon  Injectable 1 milliGRAM(s) IntraMuscular once  heparin   Injectable 5000 Unit(s) SubCutaneous every 12 hours  imipramine 50 milliGRAM(s) Oral daily  insulin glargine Injectable (LANTUS) 8 Unit(s) SubCutaneous at bedtime  insulin lispro (ADMELOG) corrective regimen sliding scale   SubCutaneous three times a day before meals  metoprolol tartrate 100 milliGRAM(s) Oral every 12 hours  pantoprazole    Tablet 40 milliGRAM(s) Oral before breakfast    MEDICATIONS  (PRN):  acetaminophen     Tablet .. 650 milliGRAM(s) Oral every 6 hours PRN Temp greater or equal to 38C (100.4F), Mild Pain (1 - 3)  aluminum hydroxide/magnesium hydroxide/simethicone Suspension 30 milliLiter(s) Oral every 4 hours PRN Dyspepsia  dextrose Oral Gel 15 Gram(s) Oral once PRN Blood Glucose LESS THAN 70 milliGRAM(s)/deciliter  diphenhydrAMINE Injectable 25 milliGRAM(s) IV Push <User Schedule> PRN Combative behavior  melatonin 3 milliGRAM(s) Oral at bedtime PRN Insomnia      Allergies    latex (Unknown)  No Known Drug Allergies    Intolerances        Vital Signs Last 24 Hrs  T(C): 36.5 (25 Jun 2022 04:39), Max: 37.2 (24 Jun 2022 20:17)  T(F): 97.7 (25 Jun 2022 04:39), Max: 98.9 (24 Jun 2022 20:17)  HR: 69 (25 Jun 2022 04:39) (57 - 69)  BP: 135/64 (25 Jun 2022 04:39) (122/45 - 149/66)  BP(mean): --  RR: 18 (25 Jun 2022 04:39) (18 - 18)  SpO2: 98% (25 Jun 2022 04:39) (96% - 99%)    PHYSICAL EXAM:  GENERAL: NAD, well-groomed, well-developed  HEAD:  Atraumatic, Normocephalic  ENMT: Moist mucous membranes,   NECK: Supple, No JVD, Normal thyroid  NERVOUS SYSTEM:  All 4 extremities mobile, no gross sensory deficits.   CHEST/LUNG: Clear to auscultation bilaterally; No rales, rhonchi, wheezing, or rubs  HEART: Regular rate and rhythm; Grade II/VI Systolic murmur heard over L sternal border  ABDOMEN: Soft, Nontender, Nondistended; Bowel sounds present  EXTREMITIES:  2+ Peripheral Pulses, No clubbing, cyanosis, or edema      LABS:                        9.4    14.03 )-----------( 294      ( 25 Jun 2022 06:05 )             30.3     25 Jun 2022 06:05    137    |  99     |  32     ----------------------------<  166    4.8     |  32     |  3.80     Ca    9.5        25 Jun 2022 06:05          CAPILLARY BLOOD GLUCOSE      POCT Blood Glucose.: 374 mg/dL (25 Jun 2022 11:53)  POCT Blood Glucose.: 173 mg/dL (25 Jun 2022 08:02)  POCT Blood Glucose.: 190 mg/dL (24 Jun 2022 21:32)  POCT Blood Glucose.: 270 mg/dL (24 Jun 2022 16:53)  POCT Blood Glucose.: 236 mg/dL (24 Jun 2022 14:31)  POCT Blood Glucose.: 193 mg/dL (24 Jun 2022 13:16)

## 2022-06-25 NOTE — PROGRESS NOTE ADULT - ASSESSMENT
70 yo female with PMH of HTN, DM2, CAD, CHF, A.fib, multiple falls and ESRD on HD was admitted on 6/15 with generalized weakness. She is being treated for foot infection, noted to have R toe gangrene with superimposed infection. S/p right hallux amputation on 6/23. She has had osteomyelitis of Left medial malleolus previously, and prior tissue cultures from 3/30/22 grew   klebsiella oxytoca/raoutella oxytoca, E. coli, MSSA. On 6/21 s/p Femoral popliteal bypass with prosthetic graft.    Tissue cultures growing staph aureus. No prior hx of MRSA, can observe closely pending sensitivities. She remains afebrile and WBC stable at 14. Initial blood cultures no growth.    -continue ceftriaxone 2g IV q24h, if any fever/clinical change would give a stat dose of vancomycin 1g IV  -follow cultures to completion    Lupe Tsai MD  Division of infectious Diseases  Cell [phone] between 8am and 6pm  After 6pm and over the weekends please call ID service line at [phone].

## 2022-06-25 NOTE — PROGRESS NOTE ADULT - ASSESSMENT
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 Lantus dec to 10 units qhs by endocrine. HD per nephro. Trops downtrended, was likely elevated from ESRD.  Plan for RLE revascularization tomorrow w/ vasc team. NPO after MN, active T+S, blood products on hold, will have HD session. Likely podiatric intervention Thurs or Fri reg amputation of great hallux. PLAVIX HELD, RESUME POST PROCEDURE IF cleared by VASC    6/21/22 - Pt is an RCRI Class IV risk candidate going for intermediate risk procedure, and is medically optimized for this procedure. Will need close intraop cardiac monitoring. Cardiac clearance noted. Labs, VS reviewed. ECG w/ TWI in few precordial leads, cardio following. tentative plan for great toe amp Thurs or Fri, pods following. C/w Rocephin per ID. Continue holding Plavix in anticipation of potential amputation per pods.  *could not reach ICE contact by phone     Problem/Plan - 1:  ·  Problem: Hypoglycemia.  ·  Plan: - Patient presented with c/o generalized weakness and fall and was found to have blood sugar in 30's  - Patient with hx of T2DM, on Lantus 40 units qhs not on any oral hypoglycemics per outpatient pharmacy   - Accu checks  - Increased Lantus from 8 to 10u QHS  - continue ISS   - endocrine consult noted.     Problem/Plan - 2:  ·  Problem: SIRS (systemic inflammatory response syndrome). probably 2/2 to Rt toe infection  ·  Plan: -- CXR: no clear evidence of PNA   - Hx of L medial malleolus growing klebsiella oxytoca/raoutella oxytoca, E. coli, MSSA. Recent blood cultures negative.  - S/P R fem-BK pop w/PTFE POD# 4, S/P Partial ray amputation of R foot  POD #2  - reactive leucocytosis.  - Bone culture growing Staph aureus, sensitivity pending  - ID Dr. Tsai consult noted.  will c/w Rocephin as per ID. If develops fever/systemic signs of infection, give stat dose of Vanco  Problem/Plan - 3:  ·  Problem: Pleural effusion.   ·  Plan: - No Hx of pulmonary disease  - Patient does not c/o cough, fever or chills  - CT Cervical shows Bilateral pleural effusions and interlobular septal thickening due to interstitial edema.  - ID Dr. Tsai, consult appreciated    Problem/Plan - 4:  ·  Problem: HFrEF (heart failure with reduced ejection fraction).   ·  Plan: - patient with hx of HFrEF  - last ECHO in 04/22 shows EF of 45%, moderate MR  - Admission labs show ProBNP of [id number]  - Likely elevated in the setting of ESRD  - Consult Dr. James, will appreciate recs   - Avoid excessive fluids.     Problem/Plan - 5:  ·  Problem: Elevated troponin.   ·  Plan: - Admission labs show elevated Troponin of 275.9  - Patient denies any chest pain, sob or palpitations  - EKG shows NSR with left axis deviation with non specific ST and T waves changes  - elevated troponin likely due to ESRD  - trend x3 or to peak.     Problem/Plan - 6:  ·  Problem: ESRD on hemodialysis.   ·  Plan: - Patient with hx of ESRD  - On HD TTS schedule  - admission eGFR 9  - Follow Dr. Edwards      Problem/Plan - 7:  ·  Problem: T2DM (type 2 diabetes mellitus).   ·  Plan: - Chronic stable  - On Lantus 40 qhs  - on lantus 8u QHS (will increase to 10u QHS due to elevated blood glucose today) with LDSS given hypoglycemic episode  - Accu checks AC/HS  - Adjust insulin requirement based on blood sugar levels  - per outpatient pharmacy patient recently rx ademolog however has not yet picked up and pharmacy unsure of dose   - endocrinology consult, Dr. Perlman.     Problem/Plan - 8:  ·  Problem: Atrial fibrillation.   ·  Plan: - Patient with hx of P A.Fib  - Not on any AC because of hx of multiple falls   - on Lopressor 100mg q12 and Diltiazem 60mg q8 - will continue with hold parameters  - EKG shows NSR with left axis deviation with non specific ST and T waves changes.     Problem/Plan - 9:  ·  Problem: Benign essential HTN.   ·  Plan: - Chronic stable  - Continue Amlodipine 5 mg with hold parameters  - Dash/Renal Diet  - continue to monitor routine hemodynamics.     Problem/Plan - 10:  ·  Problem: HLD (hyperlipidemia).   ·  Plan; - Chronic stable  - On atorvastatin 40mg at home - will continue  - Dash/Renal diet.     Problem/Plan - 11:  ·  Problem: Depression, major.   ·  Plan: - Chronic stable  - Continue imipramine 50qhs.     Problem/Plan - 12:  ·  Problem: Need for prophylactic measure.   ·  Plan: - DVT Prophylaxis: Heparin 5000 units q12.

## 2022-06-25 NOTE — PROGRESS NOTE ADULT - ASSESSMENT
Patient is a 71 year old female with PMH of A.fib rate controlled not on AC for hx of multiple falls, T2DM, HTN, HLD, ESRD on HD, CHF, s/p CABG brought in by EMS for generalized weakness at home. Patient states that she was doing well when in the afternoon she tried to get up from her couch and felt weak in the LE and fell back in her couch. Denies any hx of head trauma or loss of consciousness. Denies any weakness or numbness. Denies any chest pain, SOB or palpitations. No fever, cough or chills. No hx of recent travel or sick contacts. At the time of EMS arrival patient was found to have POCBS of 50. EMS gave dextrose and on arrival to the ED patient blood sugar was found to be in 30's with hypothermia of 94.9.    ED course:   Vitals:   BP: 176/85  HR:76  Temp:94.2  RR:18, O2:96% on RA   Significant Labs:   CBC: WBC:16.82 Hb:13.2 , Platlets: 260, Neutro:89   CMP: Lytes: WNL, BUN/Creatinine:48/4.8, Glucose:134 , Alkaline Phos:128, AST, 41, eGFR: 9, HsTrop: 275.9, ProBNP: 707104, Lactate: 2  UA; negative  CXR: Heart and lung appear normal (self read)  CT BRAIN: No acute intracranial bleeding. Central volume loss, chronic microvascular ischemic changes, and chronic bilateral lacunar infarctions.  CT CERVICAL SPINE: No fracture. Grade 1 C4-C5 anterolisthesis. C6-C7 disc degeneration. Bilateral pleural effusions and interlobular septal thickening due to   interstitial edema.  EKG: NSR, left axis deviation, HR 71,   QT/QTc: 426/462  Patient received: Ceftriaxone 1 g x1, Dextrose 5% + NS 1L x1, Dextrose 50% IV X1, heating blanket, 2L NC   (16 Jun 2022 01:19)      esrd on hd plan for hd as order        ANEMIA PLAN:  Anemia of chronic disease:  We will continue epo  aiming for a HCT of 32-36 %.   We will monitor Iron stores, B12 and RBC folate .    BP monitoring,continue current antihypertensive meds, low salt diet  metoprolol tartrate 100 milliGRAM(s) Oral every 12 hours  cloNIDine 0.1 milliGRAM(s) Oral two times a day    f/u  blood and urine cx,serial lactate levels,monitor vitals closley,ivfs hydration,monitor urine output and renal profile,iv abx  cefTRIAXone   IVPB 2000 milliGRAM(s) IV Intermittent every 24 hours

## 2022-06-26 LAB
-  AMPICILLIN: SIGNIFICANT CHANGE UP
-  TETRACYCLINE: SIGNIFICANT CHANGE UP
-  VANCOMYCIN: SIGNIFICANT CHANGE UP
METHOD TYPE: SIGNIFICANT CHANGE UP

## 2022-06-26 PROCEDURE — 99233 SBSQ HOSP IP/OBS HIGH 50: CPT

## 2022-06-26 RX ORDER — INSULIN GLARGINE 100 [IU]/ML
15 INJECTION, SOLUTION SUBCUTANEOUS AT BEDTIME
Refills: 0 | Status: DISCONTINUED | OUTPATIENT
Start: 2022-06-26 | End: 2022-06-28

## 2022-06-26 RX ORDER — INSULIN LISPRO 100/ML
3 VIAL (ML) SUBCUTANEOUS
Refills: 0 | Status: DISCONTINUED | OUTPATIENT
Start: 2022-06-26 | End: 2022-06-28

## 2022-06-26 RX ADMIN — Medication 100 MILLIGRAM(S): at 17:31

## 2022-06-26 RX ADMIN — Medication 60 MILLIGRAM(S): at 06:25

## 2022-06-26 RX ADMIN — HEPARIN SODIUM 5000 UNIT(S): 5000 INJECTION INTRAVENOUS; SUBCUTANEOUS at 17:22

## 2022-06-26 RX ADMIN — Medication 5: at 17:10

## 2022-06-26 RX ADMIN — Medication 667 MILLIGRAM(S): at 17:22

## 2022-06-26 RX ADMIN — Medication 60 MILLIGRAM(S): at 22:00

## 2022-06-26 RX ADMIN — Medication 3 UNIT(S): at 11:54

## 2022-06-26 RX ADMIN — Medication 60 MILLIGRAM(S): at 14:35

## 2022-06-26 RX ADMIN — HEPARIN SODIUM 5000 UNIT(S): 5000 INJECTION INTRAVENOUS; SUBCUTANEOUS at 06:27

## 2022-06-26 RX ADMIN — Medication 667 MILLIGRAM(S): at 11:53

## 2022-06-26 RX ADMIN — ATORVASTATIN CALCIUM 40 MILLIGRAM(S): 80 TABLET, FILM COATED ORAL at 22:01

## 2022-06-26 RX ADMIN — Medication 667 MILLIGRAM(S): at 08:30

## 2022-06-26 RX ADMIN — Medication 3 MILLIGRAM(S): at 22:00

## 2022-06-26 RX ADMIN — PANTOPRAZOLE SODIUM 40 MILLIGRAM(S): 20 TABLET, DELAYED RELEASE ORAL at 06:27

## 2022-06-26 RX ADMIN — CEFTRIAXONE 100 MILLIGRAM(S): 500 INJECTION, POWDER, FOR SOLUTION INTRAMUSCULAR; INTRAVENOUS at 17:21

## 2022-06-26 RX ADMIN — Medication 0.1 MILLIGRAM(S): at 17:22

## 2022-06-26 RX ADMIN — Medication 0.1 MILLIGRAM(S): at 06:26

## 2022-06-26 RX ADMIN — Medication 3 UNIT(S): at 17:11

## 2022-06-26 RX ADMIN — Medication 100 MILLIGRAM(S): at 06:26

## 2022-06-26 RX ADMIN — INSULIN GLARGINE 15 UNIT(S): 100 INJECTION, SOLUTION SUBCUTANEOUS at 22:01

## 2022-06-26 RX ADMIN — Medication 81 MILLIGRAM(S): at 11:55

## 2022-06-26 RX ADMIN — CHLORHEXIDINE GLUCONATE 1 APPLICATION(S): 213 SOLUTION TOPICAL at 11:52

## 2022-06-26 RX ADMIN — Medication 50 MILLIGRAM(S): at 11:53

## 2022-06-26 RX ADMIN — Medication 3: at 11:51

## 2022-06-26 RX ADMIN — Medication 3: at 07:44

## 2022-06-26 RX ADMIN — CLOPIDOGREL BISULFATE 75 MILLIGRAM(S): 75 TABLET, FILM COATED ORAL at 11:52

## 2022-06-26 NOTE — PROGRESS NOTE ADULT - SUBJECTIVE AND OBJECTIVE BOX
Patient is a 71y Female with a known history of :  SIRS (systemic inflammatory response syndrome) [R65.10]    Hypoglycemia [E16.2]    Pleural effusion [J90]    CHF, acute [I50.9]    Chronic CHF [I50.9]    Elevated troponin [R77.8]    Atrial fibrillation [I48.91]    Benign essential HTN [I10]    HLD (hyperlipidemia) [E78.5]    Depression, major [F32.9]    Need for prophylactic measure [Z29.9]    ESRD on hemodialysis [N18.6]    T2DM (type 2 diabetes mellitus) [E11.9]    HFrEF (heart failure with reduced ejection fraction) [I50.20]    Gangrene of toe of right foot [I96]    Atherosclerotic PVD with ulceration [I70.209]    PVD (peripheral vascular disease) [I73.9]      HPI:  Patient is a 71 year old female with PMH of A.fib rate controlled not on AC for hx of multiple falls, T2DM, HTN, HLD, ESRD on HD, CHF, s/p CABG brought in by EMS for generalized weakness at home. Patient states that she was doing well when in the afternoon she tried to get up from her couch and felt weak in the LE and fell back in her couch. Denies any hx of head trauma or loss of consciousness. Denies any weakness or numbness. Denies any chest pain, SOB or palpitations. No fever, cough or chills. No hx of recent travel or sick contacts. At the time of EMS arrival patient was found to have POCBS of 50. EMS gave dextrose and on arrival to the ED patient blood sugar was found to be in 30's with hypothermia of 94.9.    ED course:   Vitals:   BP: 176/85  HR:76  Temp:94.2  RR:18, O2:96% on RA   Significant Labs:   CBC: WBC:16.82 Hb:13.2 , Platlets: 260, Neutro:89   CMP: Lytes: WNL, BUN/Creatinine:48/4.8, Glucose:134 , Alkaline Phos:128, AST, 41, eGFR: 9, HsTrop: 275.9, ProBNP: 255996, Lactate: 2  UA; negative  CXR: Heart and lung appear normal (self read)  CT BRAIN: No acute intracranial bleeding. Central volume loss, chronic microvascular ischemic changes, and chronic bilateral lacunar infarctions.  CT CERVICAL SPINE: No fracture. Grade 1 C4-C5 anterolisthesis. C6-C7 disc degeneration. Bilateral pleural effusions and interlobular septal thickening due to   interstitial edema.  EKG: NSR, left axis deviation, HR 71,   QT/QTc: 426/462  Patient received: Ceftriaxone 1 g x1, Dextrose 5% + NS 1L x1, Dextrose 50% IV X1, heating blanket, 2L NC   (16 Jun 2022 01:19)      REVIEW OF SYSTEMS:    CONSTITUTIONAL: No fever, weight loss, or fatigue  EYES: No eye pain, visual disturbances, or discharge  ENMT:  No difficulty hearing, tinnitus, vertigo; No sinus or throat pain  NECK: No pain or stiffness  BREASTS: No pain, masses, or nipple discharge  RESPIRATORY: No cough, wheezing, chills or hemoptysis; No shortness of breath  CARDIOVASCULAR: No chest pain, palpitations, dizziness, or leg swelling  GASTROINTESTINAL: No abdominal or epigastric pain. No nausea, vomiting, or hematemesis; No diarrhea or constipation. No melena or hematochezia.  GENITOURINARY: No dysuria, frequency, hematuria, or incontinence  NEUROLOGICAL: No headaches, memory loss, loss of strength, numbness, or tremors  SKIN: No itching, burning, rashes, or lesions   LYMPH NODES: No enlarged glands  ENDOCRINE: No heat or cold intolerance; No hair loss  MUSCULOSKELETAL: No joint pain or swelling; No muscle, back, or extremity pain  PSYCHIATRIC: No depression, anxiety, mood swings, or difficulty sleeping  HEME/LYMPH: No easy bruising, or bleeding gums  ALLERGY AND IMMUNOLOGIC: No hives or eczema    MEDICATIONS  (STANDING):  aspirin enteric coated 81 milliGRAM(s) Oral daily  atorvastatin 40 milliGRAM(s) Oral at bedtime  calcium acetate 667 milliGRAM(s) Oral three times a day with meals  cefTRIAXone   IVPB 2000 milliGRAM(s) IV Intermittent every 24 hours  chlorhexidine 4% Liquid 1 Application(s) Topical <User Schedule>  cloNIDine 0.1 milliGRAM(s) Oral two times a day  clopidogrel Tablet 75 milliGRAM(s) Oral daily  dextrose 5%. 1000 milliLiter(s) (50 mL/Hr) IV Continuous <Continuous>  dextrose 50% Injectable 25 Gram(s) IV Push once  diltiazem    Tablet 60 milliGRAM(s) Oral every 8 hours  glucagon  Injectable 1 milliGRAM(s) IntraMuscular once  heparin   Injectable 5000 Unit(s) SubCutaneous every 12 hours  imipramine 50 milliGRAM(s) Oral daily  insulin glargine Injectable (LANTUS) 15 Unit(s) SubCutaneous at bedtime  insulin lispro (ADMELOG) corrective regimen sliding scale   SubCutaneous three times a day before meals  insulin lispro Injectable (ADMELOG) 3 Unit(s) SubCutaneous three times a day before meals  metoprolol tartrate 100 milliGRAM(s) Oral every 12 hours  pantoprazole    Tablet 40 milliGRAM(s) Oral before breakfast    MEDICATIONS  (PRN):  acetaminophen     Tablet .. 650 milliGRAM(s) Oral every 6 hours PRN Temp greater or equal to 38C (100.4F), Mild Pain (1 - 3)  aluminum hydroxide/magnesium hydroxide/simethicone Suspension 30 milliLiter(s) Oral every 4 hours PRN Dyspepsia  dextrose Oral Gel 15 Gram(s) Oral once PRN Blood Glucose LESS THAN 70 milliGRAM(s)/deciliter  diphenhydrAMINE Injectable 25 milliGRAM(s) IV Push <User Schedule> PRN Combative behavior  melatonin 3 milliGRAM(s) Oral at bedtime PRN Insomnia      ALLERGIES: latex (Unknown)  No Known Drug Allergies      FAMILY HISTORY:  No pertinent family history in first degree relatives        PHYSICAL EXAMINATION:  -----------------------------  T(C): 37 (06-26-22 @ 04:59), Max: 37.6 (06-25-22 @ 21:15)  HR: 69 (06-26-22 @ 06:25) (69 - 78)  BP: 130/57 (06-26-22 @ 06:25) (125/61 - 150/52)  RR: 17 (06-26-22 @ 04:59) (17 - 19)  SpO2: 96% (06-26-22 @ 04:59) (95% - 100%)  Wt(kg): --        VITALS  T(C): 37 (06-26-22 @ 04:59), Max: 37.6 (06-25-22 @ 21:15)  HR: 69 (06-26-22 @ 06:25) (69 - 78)  BP: 130/57 (06-26-22 @ 06:25) (125/61 - 150/52)  RR: 17 (06-26-22 @ 04:59) (17 - 19)  SpO2: 96% (06-26-22 @ 04:59) (95% - 100%)    Constitutional: well developed, normal appearance, well groomed, well nourished, no deformities and no acute distress.   Eyes: the conjunctiva exhibited no abnormalities and the eyelids demonstrated no xanthelasmas.   HEENT: normal oral mucosa, no oral pallor and no oral cyanosis.   Neck: normal jugular venous A waves present, normal jugular venous V waves present and no jugular venous wilson A waves.   Pulmonary: no respiratory distress, normal respiratory rhythm and effort, no accessory muscle use and lungs were clear to auscultation bilaterally.   Cardiovascular: heart rate and rhythm were normal, normal S1 and S2 and no murmur, gallop, rub, heave or thrill are present.   Abdomen: soft, non-tender, no hepato-splenomegaly and no abdominal mass palpated.   Musculoskeletal: the gait could not be assessed..   Extremities: no clubbing of the fingernails, no localized cyanosis, no petechial hemorrhages and no ischemic changes.   Skin: normal skin color and pigmentation, no rash, no venous stasis, no skin lesions, no skin ulcer and no xanthoma was observed.   Psychiatric: oriented to person, place, and time, the affect was normal, the mood was normal and not feeling anxious.     LABS:   --------  06-25    137  |  99  |  32<H>  ----------------------------<  166<H>  4.8   |  32<H>  |  3.80<H>    Ca    9.5      25 Jun 2022 06:05                           9.4    14.03 )-----------( 294      ( 25 Jun 2022 06:05 )             30.3                 RADIOLOGY:  -----------------    ECG:     ECHO:

## 2022-06-26 NOTE — PROGRESS NOTE ADULT - ASSESSMENT
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 Lantus dec to 10 units qhs by endocrine. HD per nephro. Trops downtrended, was likely elevated from ESRD.  Plan for RLE revascularization tomorrow w/ vasc team. NPO after MN, active T+S, blood products on hold, will have HD session. Likely podiatric intervention Thurs or Fri reg amputation of great hallux. PLAVIX HELD, RESUME POST PROCEDURE IF cleared by VASC    6/21/22 - Pt is an RCRI Class IV risk candidate going for intermediate risk procedure, and is medically optimized for this procedure. Will need close intraop cardiac monitoring. Cardiac clearance noted. Labs, VS reviewed. ECG w/ TWI in few precordial leads, cardio following. tentative plan for great toe amp Thurs or Fri, pods following. C/w Rocephin per ID. Continue holding Plavix in anticipation of potential amputation per pods.  *could not reach ICE contact by phone     Problem/Plan - 1:  ·  Problem: Hypoglycemia.  ·  Plan: - Patient presented with c/o generalized weakness and fall and was found to have blood sugar in 30's  - Patient with hx of T2DM, on Lantus 40 units qhs not on any oral hypoglycemics per outpatient pharmacy   - Accu checks  - Increased Lantus from 8 to 10u QHS  - continue ISS   - endocrine consult noted.     Problem/Plan - 2:  ·  Problem: SIRS (systemic inflammatory response syndrome). probably 2/2 to Rt toe infection  ·  Plan: -- CXR: no clear evidence of PNA   - Hx of L medial malleolus growing klebsiella oxytoca/raoutella oxytoca, E. coli, MSSA. Recent blood cultures negative.  - S/P R fem-BK pop w/PTFE POD# 4, S/P Partial ray amputation of R foot  POD #2  - reactive leucocytosis.  - Bone culture growing Staph aureus, sensitivity pending  - ID Dr. Tsai consult noted.  will c/w Rocephin as per ID. If develops fever/systemic signs of infection, give stat dose of Vanco  Problem/Plan - 3:  ·  Problem: Pleural effusion.   ·  Plan: - No Hx of pulmonary disease  - Patient does not c/o cough, fever or chills  - CT Cervical shows Bilateral pleural effusions and interlobular septal thickening due to interstitial edema.  - ID Dr. Tsai, consult appreciated    Problem/Plan - 4:  ·  Problem: HFrEF (heart failure with reduced ejection fraction).   ·  Plan: - patient with hx of HFrEF  - last ECHO in 04/22 shows EF of 45%, moderate MR  - Admission labs show ProBNP of [id number]  - Likely elevated in the setting of ESRD  - Consult Dr. James, will appreciate recs   - Avoid excessive fluids.     Problem/Plan - 5:  ·  Problem: Elevated troponin.   ·  Plan: - Admission labs show elevated Troponin of 275.9  - Patient denies any chest pain, sob or palpitations  - EKG shows NSR with left axis deviation with non specific ST and T waves changes  - elevated troponin likely due to ESRD  - trend x3 or to peak.     Problem/Plan - 6:  ·  Problem: ESRD on hemodialysis.   ·  Plan: - Patient with hx of ESRD  - On HD TTS schedule  - admission eGFR 9  - Follow Dr. Edwards      Problem/Plan - 7:  ·  Problem: T2DM (type 2 diabetes mellitus).   ·  Plan: - Chronic stable  - On Lantus 40 qhs  - on lantus 8u QHS (will increase to 10u QHS due to elevated blood glucose today) with LDSS given hypoglycemic episode  - Accu checks AC/HS  - Adjust insulin requirement based on blood sugar levels  - per outpatient pharmacy patient recently rx ademolog however has not yet picked up and pharmacy unsure of dose   - endocrinology consult, Dr. Perlman.     Problem/Plan - 8:  ·  Problem: Atrial fibrillation.   ·  Plan: - Patient with hx of P A.Fib  - Not on any AC because of hx of multiple falls   - on Lopressor 100mg q12 and Diltiazem 60mg q8 - will continue with hold parameters  - EKG shows NSR with left axis deviation with non specific ST and T waves changes.     Problem/Plan - 9:  ·  Problem: Benign essential HTN.   ·  Plan: - Chronic stable  - Continue Amlodipine 5 mg with hold parameters  - Dash/Renal Diet  - continue to monitor routine hemodynamics.     Problem/Plan - 10:  ·  Problem: HLD (hyperlipidemia).   ·  Plan; - Chronic stable  - On atorvastatin 40mg at home - will continue  - Dash/Renal diet.     Problem/Plan - 11:  ·  Problem: Depression, major.   ·  Plan: - Chronic stable  - Continue imipramine 50qhs.     Problem/Plan - 12:  ·  Problem: Need for prophylactic measure.   ·  Plan: - DVT Prophylaxis: Heparin 5000 units q12. 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 Lantus dec to 10 units qhs by endocrine. HD per nephro. Trops downtrended, was likely elevated from ESRD.  Plan for RLE revascularization tomorrow w/ vasc team. NPO after MN, active T+S, blood products on hold, will have HD session. Likely podiatric intervention Thurs or Fri reg amputation of great hallux. PLAVIX HELD, RESUME POST PROCEDURE IF cleared by VASC    6/21/22 - Pt is an RCRI Class IV risk candidate going for intermediate risk procedure, and is medically optimized for this procedure. Will need close intraop cardiac monitoring. Cardiac clearance noted. Labs, VS reviewed. ECG w/ TWI in few precordial leads, cardio following. tentative plan for great toe amp Thurs or Fri, pods following. C/w Rocephin per ID. Continue holding Plavix in anticipation of potential amputation per pods.  *could not reach ICE contact by phone     Problem/Plan - 1:  ·  Problem: Hypoglycemia.  ·  Plan: - Patient presented with c/o generalized weakness and fall and was found to have blood sugar in 30's  - Patient with hx of T2DM, on Lantus 40 units qhs not on any oral hypoglycemics per outpatient pharmacy   - Accu checks  - appreciate endo recs: Lantus increased from 10u to 15u QHS with Lispro 3u TIDAC   - continue ISS        Problem/Plan - 2:  ·  Problem: SIRS (systemic inflammatory response syndrome). probably 2/2 to Rt toe infection  ·  Plan: -- CXR: no clear evidence of PNA   - Hx of L medial malleolus growing klebsiella oxytoca/raoutella oxytoca, E. coli, MSSA. Recent blood cultures negative.  - S/P R fem-BK pop w/PTFE POD# 5, S/P Partial ray amputation of R foot  POD #3  - reactive leucocytosis.  - Bone culture growing MSSA  - ID recs appreciated. will c/w Rocephin for MSSA     Problem/Plan - 3:  ·  Problem: Pleural effusion.   ·  Plan: - No Hx of pulmonary disease  - Patient does not c/o cough, fever or chills  - CT Cervical shows Bilateral pleural effusions and interlobular septal thickening due to interstitial edema.  - ID Dr. Tsai, consult appreciated      Problem/Plan - 4:  ·  Problem: HFrEF (heart failure with reduced ejection fraction).   ·  Plan: - patient with hx of HFrEF  - last ECHO in 04/22 shows EF of 45%, moderate MR  - Admission labs show ProBNP of 323907  - Likely elevated in the setting of ESRD  - Consult Dr. James, will appreciate recs   - Avoid excessive fluids.     Problem/Plan - 5:  ·  Problem: Elevated troponin.   ·  Plan: - Admission labs show elevated Troponin of 275.9  - Patient denies any chest pain, sob or palpitations  - EKG shows NSR with left axis deviation with non specific ST and T waves changes  - elevated troponin likely due to ESRD     Problem/Plan - 6:  ·  Problem: ESRD on hemodialysis.   ·  Plan: - Patient with hx of ESRD  - On HD TTS schedule  - admission eGFR 9  - Follow Dr. Edwards      Problem/Plan - 7:  ·  Problem: T2DM (type 2 diabetes mellitus).   ·  Plan: - Chronic stable  - home dose Lantus 40 qhs  - plan as above     Problem/Plan - 8:  ·  Problem: Atrial fibrillation.   ·  Plan: - Patient with hx of P A.Fib  - Not on any AC because of hx of multiple falls   - on Lopressor 100mg q12 and Diltiazem 60mg q8 - will continue with hold parameters  - EKG shows NSR with left axis deviation with non specific ST and T waves changes.     Problem/Plan - 9:  ·  Problem: Benign essential HTN.   ·  Plan: - Chronic stable  - Continue Amlodipine 5 mg with hold parameters  - Dash/Renal Diet  - continue to monitor routine hemodynamics.     Problem/Plan - 10:  ·  Problem: HLD (hyperlipidemia).   ·  Plan; - Chronic stable  - On atorvastatin 40mg at home - will continue  - Dash/Renal diet.     Problem/Plan - 11:  ·  Problem: Depression, major.   ·  Plan: - Chronic stable  - Continue imipramine 50qhs.     Problem/Plan - 12:  ·  Problem: Need for prophylactic measure.   ·  Plan: - DVT Prophylaxis: Heparin 5000 units q12.

## 2022-06-26 NOTE — PROGRESS NOTE ADULT - SUBJECTIVE AND OBJECTIVE BOX
CAPILLARY BLOOD GLUCOSE      POCT Blood Glucose.: 300 mg/dL (26 Jun 2022 11:29)  POCT Blood Glucose.: 263 mg/dL (26 Jun 2022 07:34)  POCT Blood Glucose.: 298 mg/dL (25 Jun 2022 22:02)  POCT Blood Glucose.: 429 mg/dL (25 Jun 2022 16:46)  POCT Blood Glucose.: 503 mg/dL (25 Jun 2022 13:59)      Vital Signs Last 24 Hrs  T(C): 37 (26 Jun 2022 04:59), Max: 37.6 (25 Jun 2022 21:15)  T(F): 98.6 (26 Jun 2022 04:59), Max: 99.6 (25 Jun 2022 21:15)  HR: 69 (26 Jun 2022 06:25) (69 - 78)  BP: 130/57 (26 Jun 2022 06:25) (125/61 - 150/52)  BP(mean): --  RR: 17 (26 Jun 2022 04:59) (17 - 19)  SpO2: 96% (26 Jun 2022 04:59) (95% - 100%)    General: WN/WD NAD  Respiratory: CTA B/L  CV: RRR, S1S2, no murmurs, rubs or gallops  Abdominal: Soft, NT, ND +BS, Last BM  Extremities: No edema, + peripheral pulses     06-25    137  |  99  |  32<H>  ----------------------------<  166<H>  4.8   |  32<H>  |  3.80<H>    Ca    9.5      25 Jun 2022 06:05        atorvastatin 40 milliGRAM(s) Oral at bedtime  dextrose 50% Injectable 25 Gram(s) IV Push once  dextrose Oral Gel 15 Gram(s) Oral once PRN  glucagon  Injectable 1 milliGRAM(s) IntraMuscular once  insulin glargine Injectable (LANTUS) 15 Unit(s) SubCutaneous at bedtime  insulin lispro (ADMELOG) corrective regimen sliding scale   SubCutaneous three times a day before meals  insulin lispro Injectable (ADMELOG) 3 Unit(s) SubCutaneous three times a day before meals

## 2022-06-26 NOTE — PROGRESS NOTE ADULT - PROBLEM SELECTOR PLAN 1
lantus increased 15 units daily  admelog 3 units 3x/day before meals added  cont low dose admelog corrective scale coverage qac/qhs  cont cons cho diet  goal bg conservative mngmt

## 2022-06-26 NOTE — PROGRESS NOTE ADULT - SUBJECTIVE AND OBJECTIVE BOX
Patient is a 71y Female whom presented to the hospital with esrd on hd     PAST MEDICAL & SURGICAL HISTORY:  Diabetes mellitus II      HTN (hypertension)      h/o Anxiety attack      Depression      h/o Myocardial infarct 2007      CAD (coronary artery disease)      h/o Hepatitis A 1969  currently resolved, no symptoms      PAD (peripheral artery disease)      Murmur, cardiac      h/o Smoking  quitted 3/2012      CRF (chronic renal failure), unspecified stage      Dialysis patient      Anemia secondary to renal failure      HTN (hypertension)      coronary stent 2007      s/p Ovarian cyst removal      s/p surgical removal of benign Skin lesion epigastric area          MEDICATIONS  (STANDING):  amLODIPine   Tablet 5 milliGRAM(s) Oral daily  aspirin enteric coated 81 milliGRAM(s) Oral daily  atorvastatin 40 milliGRAM(s) Oral at bedtime  calcium acetate 667 milliGRAM(s) Oral three times a day with meals  cefTRIAXone   IVPB 1000 milliGRAM(s) IV Intermittent every 24 hours  cloNIDine 0.1 milliGRAM(s) Oral two times a day  clopidogrel Tablet 75 milliGRAM(s) Oral daily  dextrose 5%. 1000 milliLiter(s) (100 mL/Hr) IV Continuous <Continuous>  dextrose 5%. 1000 milliLiter(s) (50 mL/Hr) IV Continuous <Continuous>  dextrose 50% Injectable 25 Gram(s) IV Push once  dextrose 50% Injectable 12.5 Gram(s) IV Push once  dextrose 50% Injectable 25 Gram(s) IV Push once  diltiazem    Tablet 60 milliGRAM(s) Oral every 8 hours  glucagon  Injectable 1 milliGRAM(s) IntraMuscular once  heparin   Injectable 5000 Unit(s) SubCutaneous every 12 hours  imipramine 50 milliGRAM(s) Oral daily  insulin glargine Injectable (LANTUS) 12 Unit(s) SubCutaneous at bedtime  insulin lispro (ADMELOG) corrective regimen sliding scale   SubCutaneous three times a day before meals  insulin lispro (ADMELOG) corrective regimen sliding scale   SubCutaneous at bedtime  metoprolol tartrate 100 milliGRAM(s) Oral every 12 hours  pantoprazole    Tablet 40 milliGRAM(s) Oral before breakfast  povidone iodine 10% Solution 1 Application(s) Topical daily      Allergies    latex (Unknown)  No Known Drug Allergies    Intolerances        SOCIAL HISTORY:  Denies ETOh,Smoking,     FAMILY HISTORY:  No pertinent family history in first degree relatives        REVIEW OF SYSTEMS:    CONSTITUTIONAL: No weakness, fevers or chills  RESPIRATORY: No cough, wheezing, hemoptysis; No shortness of breath  CARDIOVASCULAR: No chest pain or palpitations  GASTROINTESTINAL: No abdominal or epigastric pain. No nausea, vomiting,     No diarrhea or constipation. No melena   SKIN: dry                                                          9.4    14.03 )-----------( 294      ( 25 Jun 2022 06:05 )             30.3       CBC Full  -  ( 25 Jun 2022 06:05 )  WBC Count : 14.03 K/uL  RBC Count : 3.20 M/uL  Hemoglobin : 9.4 g/dL  Hematocrit : 30.3 %  Platelet Count - Automated : 294 K/uL  Mean Cell Volume : 94.7 fl  Mean Cell Hemoglobin : 29.4 pg  Mean Cell Hemoglobin Concentration : 31.0 gm/dL  Auto Neutrophil # : x  Auto Lymphocyte # : x  Auto Monocyte # : x  Auto Eosinophil # : x  Auto Basophil # : x  Auto Neutrophil % : x  Auto Lymphocyte % : x  Auto Monocyte % : x  Auto Eosinophil % : x  Auto Basophil % : x      06-25    137  |  99  |  32<H>  ----------------------------<  166<H>  4.8   |  32<H>  |  3.80<H>    Ca    9.5      25 Jun 2022 06:05        CAPILLARY BLOOD GLUCOSE      POCT Blood Glucose.: 300 mg/dL (26 Jun 2022 11:29)  POCT Blood Glucose.: 263 mg/dL (26 Jun 2022 07:34)  POCT Blood Glucose.: 298 mg/dL (25 Jun 2022 22:02)  POCT Blood Glucose.: 429 mg/dL (25 Jun 2022 16:46)      Vital Signs Last 24 Hrs  T(C): 36.8 (26 Jun 2022 13:37), Max: 37.6 (25 Jun 2022 21:15)  T(F): 98.2 (26 Jun 2022 13:37), Max: 99.6 (25 Jun 2022 21:15)  HR: 63 (26 Jun 2022 13:37) (63 - 78)  BP: 122/63 (26 Jun 2022 13:37) (122/63 - 150/52)  BP(mean): --  RR: 18 (26 Jun 2022 13:37) (17 - 19)  SpO2: 94% (26 Jun 2022 13:37) (94% - 98%)                PHYSICAL EXAM:    Constitutional: NAD  HEENT: conjunctive   clear   Neck:  No JVD  Respiratory: CTAB  Cardiovascular: S1 and S2  Gastrointestinal: BS+, soft, NT/ND  Skin: dry

## 2022-06-26 NOTE — PROGRESS NOTE ADULT - SUBJECTIVE AND OBJECTIVE BOX
Subjective    MEDICATIONS  (STANDING):  aspirin enteric coated 81 milliGRAM(s) Oral daily  atorvastatin 40 milliGRAM(s) Oral at bedtime  calcium acetate 667 milliGRAM(s) Oral three times a day with meals  cefTRIAXone   IVPB 2000 milliGRAM(s) IV Intermittent every 24 hours  chlorhexidine 4% Liquid 1 Application(s) Topical <User Schedule>  cloNIDine 0.1 milliGRAM(s) Oral two times a day  clopidogrel Tablet 75 milliGRAM(s) Oral daily  dextrose 5%. 1000 milliLiter(s) (50 mL/Hr) IV Continuous <Continuous>  dextrose 50% Injectable 25 Gram(s) IV Push once  diltiazem    Tablet 60 milliGRAM(s) Oral every 8 hours  glucagon  Injectable 1 milliGRAM(s) IntraMuscular once  heparin   Injectable 5000 Unit(s) SubCutaneous every 12 hours  imipramine 50 milliGRAM(s) Oral daily  insulin glargine Injectable (LANTUS) 15 Unit(s) SubCutaneous at bedtime  insulin lispro (ADMELOG) corrective regimen sliding scale   SubCutaneous three times a day before meals  insulin lispro Injectable (ADMELOG) 3 Unit(s) SubCutaneous three times a day before meals  metoprolol tartrate 100 milliGRAM(s) Oral every 12 hours  pantoprazole    Tablet 40 milliGRAM(s) Oral before breakfast    MEDICATIONS  (PRN):  acetaminophen     Tablet .. 650 milliGRAM(s) Oral every 6 hours PRN Temp greater or equal to 38C (100.4F), Mild Pain (1 - 3)  aluminum hydroxide/magnesium hydroxide/simethicone Suspension 30 milliLiter(s) Oral every 4 hours PRN Dyspepsia  dextrose Oral Gel 15 Gram(s) Oral once PRN Blood Glucose LESS THAN 70 milliGRAM(s)/deciliter  diphenhydrAMINE Injectable 25 milliGRAM(s) IV Push <User Schedule> PRN Combative behavior  melatonin 3 milliGRAM(s) Oral at bedtime PRN Insomnia      Allergies    latex (Unknown)  No Known Drug Allergies    Intolerances        Vital Signs Last 24 Hrs  T(C): 36.8 (26 Jun 2022 13:37), Max: 37.6 (25 Jun 2022 21:15)  T(F): 98.2 (26 Jun 2022 13:37), Max: 99.6 (25 Jun 2022 21:15)  HR: 63 (26 Jun 2022 13:37) (63 - 78)  BP: 122/63 (26 Jun 2022 13:37) (122/63 - 150/52)  BP(mean): --  RR: 18 (26 Jun 2022 13:37) (17 - 19)  SpO2: 94% (26 Jun 2022 13:37) (94% - 98%)    PHYSICAL EXAM:  GENERAL: NAD, well-groomed, well-developed  HEAD:  Atraumatic, Normocephalic  ENMT: Moist mucous membranes,   NECK: Supple, No JVD, Normal thyroid  NERVOUS SYSTEM:  All 4 extremities mobile, no gross sensory deficits.   CHEST/LUNG: Clear to auscultation bilaterally; No rales, rhonchi, wheezing, or rubs  HEART: Regular rate and rhythm; No murmurs, rubs, or gallops  ABDOMEN: Soft, Nontender, Nondistended; Bowel sounds present  EXTREMITIES:  2+ Peripheral Pulses, No clubbing, cyanosis, or edema      LABS:      Ca    9.5        25 Jun 2022 06:05          CAPILLARY BLOOD GLUCOSE      POCT Blood Glucose.: 300 mg/dL (26 Jun 2022 11:29)  POCT Blood Glucose.: 263 mg/dL (26 Jun 2022 07:34)  POCT Blood Glucose.: 298 mg/dL (25 Jun 2022 22:02)  POCT Blood Glucose.: 429 mg/dL (25 Jun 2022 16:46)      RADIOLOGY & ADDITIONAL TESTS:    Imaging Personally Reviewed:  [ ] YES     Consultant(s) Notes Reviewed:      Care Discussed with Consultants/Other Providers:    Advanced Directives: [ ] DNR  [ ] No feeding tube  [ ] MOLST in chart  [ ] MOLST completed today  [ ] Unknown   Patient is a 71y old  Female who presents with a chief complaint of SIRS. S/P R fem-BK pop w/PTFE POD# 5, S/P Partial ray amputation of R foot  POD #3    Subjective: Patient feels much better today and tolerating diet. She reports pain in R foot but has not requested any pain medications. Pt was informed that she has MSSA infection and will need several weeks of IV antibiotics. She reports that her  was brought to the hospital for stroke and transferred to another hospital. She does not believe she will have any aid at home and may need nursing facility for IV abx therapy.    MEDICATIONS  (STANDING):  aspirin enteric coated 81 milliGRAM(s) Oral daily  atorvastatin 40 milliGRAM(s) Oral at bedtime  calcium acetate 667 milliGRAM(s) Oral three times a day with meals  cefTRIAXone   IVPB 2000 milliGRAM(s) IV Intermittent every 24 hours  chlorhexidine 4% Liquid 1 Application(s) Topical <User Schedule>  cloNIDine 0.1 milliGRAM(s) Oral two times a day  clopidogrel Tablet 75 milliGRAM(s) Oral daily  dextrose 5%. 1000 milliLiter(s) (50 mL/Hr) IV Continuous <Continuous>  dextrose 50% Injectable 25 Gram(s) IV Push once  diltiazem    Tablet 60 milliGRAM(s) Oral every 8 hours  glucagon  Injectable 1 milliGRAM(s) IntraMuscular once  heparin   Injectable 5000 Unit(s) SubCutaneous every 12 hours  imipramine 50 milliGRAM(s) Oral daily  insulin glargine Injectable (LANTUS) 15 Unit(s) SubCutaneous at bedtime  insulin lispro (ADMELOG) corrective regimen sliding scale   SubCutaneous three times a day before meals  insulin lispro Injectable (ADMELOG) 3 Unit(s) SubCutaneous three times a day before meals  metoprolol tartrate 100 milliGRAM(s) Oral every 12 hours  pantoprazole    Tablet 40 milliGRAM(s) Oral before breakfast    MEDICATIONS  (PRN):  acetaminophen     Tablet .. 650 milliGRAM(s) Oral every 6 hours PRN Temp greater or equal to 38C (100.4F), Mild Pain (1 - 3)  aluminum hydroxide/magnesium hydroxide/simethicone Suspension 30 milliLiter(s) Oral every 4 hours PRN Dyspepsia  dextrose Oral Gel 15 Gram(s) Oral once PRN Blood Glucose LESS THAN 70 milliGRAM(s)/deciliter  diphenhydrAMINE Injectable 25 milliGRAM(s) IV Push <User Schedule> PRN Combative behavior  melatonin 3 milliGRAM(s) Oral at bedtime PRN Insomnia      Allergies    latex (Unknown)  No Known Drug Allergies    Intolerances        Vital Signs Last 24 Hrs  T(C): 36.8 (26 Jun 2022 13:37), Max: 37.6 (25 Jun 2022 21:15)  T(F): 98.2 (26 Jun 2022 13:37), Max: 99.6 (25 Jun 2022 21:15)  HR: 63 (26 Jun 2022 13:37) (63 - 78)  BP: 122/63 (26 Jun 2022 13:37) (122/63 - 150/52)  BP(mean): --  RR: 18 (26 Jun 2022 13:37) (17 - 19)  SpO2: 94% (26 Jun 2022 13:37) (94% - 98%)    PHYSICAL EXAM:  GENERAL: NAD, well-groomed, well-developed  HEAD:  Atraumatic, Normocephalic  ENMT: Moist mucous membranes,   NECK: Supple, No JVD, Normal thyroid  NERVOUS SYSTEM:  All 4 extremities mobile, no gross sensory deficits.   CHEST/LUNG: Clear to auscultation bilaterally; No rales, rhonchi, wheezing, or rubs  HEART: Regular rate and rhythm; Grade II/VI Systolic murmur heard over L sternal border  ABDOMEN: Soft, Nontender, Nondistended; Bowel sounds present  EXTREMITIES:  2+ Peripheral Pulses, No clubbing, cyanosis, or edema, R foot with dressing and gauze s/p partial ray amputation, Prevena vac on RLE           LABS:      Ca    9.5        25 Jun 2022 06:05          CAPILLARY BLOOD GLUCOSE      POCT Blood Glucose.: 300 mg/dL (26 Jun 2022 11:29)  POCT Blood Glucose.: 263 mg/dL (26 Jun 2022 07:34)  POCT Blood Glucose.: 298 mg/dL (25 Jun 2022 22:02)  POCT Blood Glucose.: 429 mg/dL (25 Jun 2022 16:46)      RADIOLOGY & ADDITIONAL TESTS:    Imaging Personally Reviewed:  [ ] YES     Consultant(s) Notes Reviewed:      Care Discussed with Consultants/Other Providers:    Advanced Directives: [ ] DNR  [ ] No feeding tube  [ ] MOLST in chart  [ ] MOLST completed today  [ ] Unknown

## 2022-06-26 NOTE — PROGRESS NOTE ADULT - SUBJECTIVE AND OBJECTIVE BOX
HPI:  Patient is a 71 year old female with PMH of A.fib rate controlled not on AC for hx of multiple falls, T2DM, HTN, HLD, ESRD on HD, CHF, s/p CABG brought in by EMS for generalized weakness at home. Patient states that she was doing well when in the afternoon she tried to get up from her couch and felt weak in the LE and fell back in her couch. Denies any hx of head trauma or loss of consciousness. Denies any weakness or numbness. Denies any chest pain, SOB or palpitations. No fever, cough or chills. No hx of recent travel or sick contacts. At the time of EMS arrival patient was found to have POCBS of 50. EMS gave dextrose and on arrival to the ED patient blood sugar was found to be in 30's with hypothermia of 94.9.    ED course:   Vitals:   BP: 176/85  HR:76  Temp:94.2  RR:18, O2:96% on RA   Significant Labs:   CBC: WBC:16.82 Hb:13.2 , Platlets: 260, Neutro:89   CMP: Lytes: WNL, BUN/Creatinine:48/4.8, Glucose:134 , Alkaline Phos:128, AST, 41, eGFR: 9, HsTrop: 275.9, ProBNP: 174922, Lactate: 2  UA; negative  CXR: Heart and lung appear normal (self read)  CT BRAIN: No acute intracranial bleeding. Central volume loss, chronic microvascular ischemic changes, and chronic bilateral lacunar infarctions.  CT CERVICAL SPINE: No fracture. Grade 1 C4-C5 anterolisthesis. C6-C7 disc degeneration. Bilateral pleural effusions and interlobular septal thickening due to   interstitial edema.  EKG: NSR, left axis deviation, HR 71,   QT/QTc: 426/462  Patient received: Ceftriaxone 1 g x1, Dextrose 5% + NS 1L x1, Dextrose 50% IV X1, heating blanket, 2L NC   (16 Jun 2022 01:19)      POD# s/p     SUBJECTIVE:  Patient seen and examined at bedside. POD#5 s/p right fem/pop bypass. No overnight events. Patient reports some foot pain but otherwise offers no other complaints. Voiding appropriately.  Patient denies any fever, chills, chest pain, shortness of breath, nausea, vomiting, or urinary complaints.    VITALS  Vital Signs Last 24 Hrs  T(C): 36.8 (26 Jun 2022 13:37), Max: 37.6 (25 Jun 2022 21:15)  T(F): 98.2 (26 Jun 2022 13:37), Max: 99.6 (25 Jun 2022 21:15)  HR: 63 (26 Jun 2022 13:37) (63 - 78)  BP: 122/63 (26 Jun 2022 13:37) (122/63 - 150/52)  BP(mean): --  RR: 18 (26 Jun 2022 13:37) (17 - 19)  SpO2: 94% (26 Jun 2022 13:37) (94% - 98%)    PHYSICAL EXAM  GENERAL:  Well-nourished, well-developed Female lying comfortably in bed in NAD.  CARDIO:  Regular rate and rhythm.  No murmur, gallop or rub appreciated.  RESPIRATORY:  Nonlabored breathing, no accessory muscle use. Lungs clear to auscultation bilaterally.  No wheezing, rales or rhonchi appreciated.  ABDOMEN:  Soft, nondistended, nontender. BS appreciated on auscultation.  No rebound tenderness or guarding.  EXTREMITIES: Prevena vac appliance d/c'd. Incisions, c/d/i, well approximated without erythema, not indurated. 5x10 cm area of ecchymosis noted on inner thigh. DP/PT pulses doplerable with strong signal.  SKIN:  No jaundice, pallor, or cyanosis  NEURO:  A&O x 3    MEDICATIONS  MEDICATIONS  (STANDING):  aspirin enteric coated 81 milliGRAM(s) Oral daily  atorvastatin 40 milliGRAM(s) Oral at bedtime  calcium acetate 667 milliGRAM(s) Oral three times a day with meals  cefTRIAXone   IVPB 2000 milliGRAM(s) IV Intermittent every 24 hours  chlorhexidine 4% Liquid 1 Application(s) Topical <User Schedule>  cloNIDine 0.1 milliGRAM(s) Oral two times a day  clopidogrel Tablet 75 milliGRAM(s) Oral daily  dextrose 5%. 1000 milliLiter(s) (50 mL/Hr) IV Continuous <Continuous>  dextrose 50% Injectable 25 Gram(s) IV Push once  diltiazem    Tablet 60 milliGRAM(s) Oral every 8 hours  glucagon  Injectable 1 milliGRAM(s) IntraMuscular once  heparin   Injectable 5000 Unit(s) SubCutaneous every 12 hours  imipramine 50 milliGRAM(s) Oral daily  insulin glargine Injectable (LANTUS) 15 Unit(s) SubCutaneous at bedtime  insulin lispro (ADMELOG) corrective regimen sliding scale   SubCutaneous three times a day before meals  insulin lispro Injectable (ADMELOG) 3 Unit(s) SubCutaneous three times a day before meals  metoprolol tartrate 100 milliGRAM(s) Oral every 12 hours  pantoprazole    Tablet 40 milliGRAM(s) Oral before breakfast    MEDICATIONS  (PRN):  acetaminophen     Tablet .. 650 milliGRAM(s) Oral every 6 hours PRN Temp greater or equal to 38C (100.4F), Mild Pain (1 - 3)  aluminum hydroxide/magnesium hydroxide/simethicone Suspension 30 milliLiter(s) Oral every 4 hours PRN Dyspepsia  dextrose Oral Gel 15 Gram(s) Oral once PRN Blood Glucose LESS THAN 70 milliGRAM(s)/deciliter  diphenhydrAMINE Injectable 25 milliGRAM(s) IV Push <User Schedule> PRN Combative behavior  melatonin 3 milliGRAM(s) Oral at bedtime PRN Insomnia    LABS:                      9.4    14.03 )-----------( 294      ( 25 Jun 2022 06:05 )             30.3     06-25    137  |  99  |  32<H>  ----------------------------<  166<H>  4.8   |  32<H>  |  3.80<H>    Ca    9.5      25 Jun 2022 06:05    Culture - Tissue with Gram Stain (collected 23 Jun 2022 15:35)  Source: .Tissue Other, right hallux bone culture  Gram Stain (24 Jun 2022 04:37):    Rare polymorphonuclear leukocytes seen per low power field    Few Gram positive cocci in pairs seen per oil power field  Preliminary Report (25 Jun 2022 19:00):    Few Staphylococcus aureus    Rare Enterococcus faecalis  Organism: Staphylococcus aureus (25 Jun 2022 16:36)  Organism: Staphylococcus aureus (25 Jun 2022 16:36)    ASSESSMENT & PLAN   71y Female POD#5 s/p right fem/pop bypass. VS reassuring. Aberrancies noted in POC blood glucose. Prevena vac appliance discontinued with well-approximated incisions left open to air.    - Continue diet  - Continue antibiotics as per primary team  - Blood sugar control via primary team/endocrine.  - VTE prophylaxis with SQH, Plavix  - Follow up AM labs  - Case to discussed with Dr. Miller

## 2022-06-26 NOTE — PROGRESS NOTE ADULT - ASSESSMENT
Patient is a 71 year old female with PMH of A.fib rate controlled not on AC for hx of multiple falls, T2DM, HTN, HLD, ESRD on HD, CHF, s/p CABG brought in by EMS for generalized weakness at home. Patient states that she was doing well when in the afternoon she tried to get up from her couch and felt weak in the LE and fell back in her couch. Denies any hx of head trauma or loss of consciousness. Denies any weakness or numbness. Denies any chest pain, SOB or palpitations. No fever, cough or chills. No hx of recent travel or sick contacts. At the time of EMS arrival patient was found to have POCBS of 50. EMS gave dextrose and on arrival to the ED patient blood sugar was found to be in 30's with hypothermia of 94.9.    ED course:   Vitals:   BP: 176/85  HR:76  Temp:94.2  RR:18, O2:96% on RA   Significant Labs:   CBC: WBC:16.82 Hb:13.2 , Platlets: 260, Neutro:89   CMP: Lytes: WNL, BUN/Creatinine:48/4.8, Glucose:134 , Alkaline Phos:128, AST, 41, eGFR: 9, HsTrop: 275.9, ProBNP: 015490, Lactate: 2  UA; negative  CXR: Heart and lung appear normal (self read)  CT BRAIN: No acute intracranial bleeding. Central volume loss, chronic microvascular ischemic changes, and chronic bilateral lacunar infarctions.  CT CERVICAL SPINE: No fracture. Grade 1 C4-C5 anterolisthesis. C6-C7 disc degeneration. Bilateral pleural effusions and interlobular septal thickening due to   interstitial edema.  EKG: NSR, left axis deviation, HR 71,   QT/QTc: 426/462  Patient received: Ceftriaxone 1 g x1, Dextrose 5% + NS 1L x1, Dextrose 50% IV X1, heating blanket, 2L NC   (16 Jun 2022 01:19)      esrd on hd plan for hd as order        ANEMIA PLAN:  Anemia of chronic disease:  We will continue epo  aiming for a HCT of 32-36 %.   We will monitor Iron stores, B12 and RBC folate .    BP monitoring,continue current antihypertensive meds, low salt diet  metoprolol tartrate 100 milliGRAM(s) Oral every 12 hours  cloNIDine 0.1 milliGRAM(s) Oral two times a day    f/u  blood and urine cx,serial lactate levels,monitor vitals closley,ivfs hydration,monitor urine output and renal profile,iv abx  cefTRIAXone   IVPB 2000 milliGRAM(s) IV Intermittent every 24 hours      dct heparin   Injectable 5000 Unit(s) SubCutaneous every 12 hours

## 2022-06-27 LAB
ALBUMIN SERPL ELPH-MCNC: 2.3 G/DL — LOW (ref 3.3–5)
ALBUMIN SERPL ELPH-MCNC: 2.4 G/DL — LOW (ref 3.3–5)
ALP SERPL-CCNC: 105 U/L — SIGNIFICANT CHANGE UP (ref 40–120)
ALP SERPL-CCNC: 107 U/L — SIGNIFICANT CHANGE UP (ref 40–120)
ALT FLD-CCNC: 10 U/L — LOW (ref 12–78)
ALT FLD-CCNC: 7 U/L — LOW (ref 12–78)
ANION GAP SERPL CALC-SCNC: 10 MMOL/L — SIGNIFICANT CHANGE UP (ref 5–17)
ANION GAP SERPL CALC-SCNC: 11 MMOL/L — SIGNIFICANT CHANGE UP (ref 5–17)
AST SERPL-CCNC: 27 U/L — SIGNIFICANT CHANGE UP (ref 15–37)
AST SERPL-CCNC: 32 U/L — SIGNIFICANT CHANGE UP (ref 15–37)
BASE EXCESS BLDA CALC-SCNC: 1.6 MMOL/L — SIGNIFICANT CHANGE UP (ref -2–3)
BASOPHILS # BLD AUTO: 0.05 K/UL — SIGNIFICANT CHANGE UP (ref 0–0.2)
BASOPHILS NFR BLD AUTO: 0.3 % — SIGNIFICANT CHANGE UP (ref 0–2)
BILIRUB SERPL-MCNC: 0.4 MG/DL — SIGNIFICANT CHANGE UP (ref 0.2–1.2)
BILIRUB SERPL-MCNC: 0.6 MG/DL — SIGNIFICANT CHANGE UP (ref 0.2–1.2)
BLOOD GAS COMMENTS ARTERIAL: SIGNIFICANT CHANGE UP
BUN SERPL-MCNC: 71 MG/DL — HIGH (ref 7–23)
BUN SERPL-MCNC: 73 MG/DL — HIGH (ref 7–23)
CALCIUM SERPL-MCNC: 10.2 MG/DL — HIGH (ref 8.5–10.1)
CALCIUM SERPL-MCNC: 12.3 MG/DL — HIGH (ref 8.5–10.1)
CHLORIDE SERPL-SCNC: 92 MMOL/L — LOW (ref 96–108)
CHLORIDE SERPL-SCNC: 92 MMOL/L — LOW (ref 96–108)
CO2 SERPL-SCNC: 27 MMOL/L — SIGNIFICANT CHANGE UP (ref 22–31)
CO2 SERPL-SCNC: 28 MMOL/L — SIGNIFICANT CHANGE UP (ref 22–31)
CREAT SERPL-MCNC: 6.2 MG/DL — HIGH (ref 0.5–1.3)
CREAT SERPL-MCNC: 6.3 MG/DL — HIGH (ref 0.5–1.3)
EGFR: 7 ML/MIN/1.73M2 — LOW
EGFR: 7 ML/MIN/1.73M2 — LOW
EOSINOPHIL # BLD AUTO: 0.11 K/UL — SIGNIFICANT CHANGE UP (ref 0–0.5)
EOSINOPHIL NFR BLD AUTO: 0.6 % — SIGNIFICANT CHANGE UP (ref 0–6)
GAS PNL BLDA: SIGNIFICANT CHANGE UP
GLUCOSE SERPL-MCNC: 264 MG/DL — HIGH (ref 70–99)
GLUCOSE SERPL-MCNC: 279 MG/DL — HIGH (ref 70–99)
HCO3 BLDA-SCNC: 26 MMOL/L — SIGNIFICANT CHANGE UP (ref 21–28)
HCT VFR BLD CALC: 27.6 % — LOW (ref 34.5–45)
HCT VFR BLD CALC: 29.5 % — LOW (ref 34.5–45)
HGB BLD-MCNC: 8.7 G/DL — LOW (ref 11.5–15.5)
HGB BLD-MCNC: 9.2 G/DL — LOW (ref 11.5–15.5)
IMM GRANULOCYTES NFR BLD AUTO: 1.2 % — SIGNIFICANT CHANGE UP (ref 0–1.5)
LACTATE SERPL-SCNC: 2.6 MMOL/L — HIGH (ref 0.7–2)
LYMPHOCYTES # BLD AUTO: 0.73 K/UL — LOW (ref 1–3.3)
LYMPHOCYTES # BLD AUTO: 3.8 % — LOW (ref 13–44)
MAGNESIUM SERPL-MCNC: 2.7 MG/DL — HIGH (ref 1.6–2.6)
MAGNESIUM SERPL-MCNC: 2.9 MG/DL — HIGH (ref 1.6–2.6)
MCHC RBC-ENTMCNC: 28.8 PG — SIGNIFICANT CHANGE UP (ref 27–34)
MCHC RBC-ENTMCNC: 28.8 PG — SIGNIFICANT CHANGE UP (ref 27–34)
MCHC RBC-ENTMCNC: 31.2 GM/DL — LOW (ref 32–36)
MCHC RBC-ENTMCNC: 31.5 GM/DL — LOW (ref 32–36)
MCV RBC AUTO: 91.4 FL — SIGNIFICANT CHANGE UP (ref 80–100)
MCV RBC AUTO: 92.2 FL — SIGNIFICANT CHANGE UP (ref 80–100)
MONOCYTES # BLD AUTO: 1.43 K/UL — HIGH (ref 0–0.9)
MONOCYTES NFR BLD AUTO: 7.5 % — SIGNIFICANT CHANGE UP (ref 2–14)
NEUTROPHILS # BLD AUTO: 16.62 K/UL — HIGH (ref 1.8–7.4)
NEUTROPHILS NFR BLD AUTO: 86.6 % — HIGH (ref 43–77)
NRBC # BLD: 0 /100 WBCS — SIGNIFICANT CHANGE UP (ref 0–0)
NRBC # BLD: 0 /100 WBCS — SIGNIFICANT CHANGE UP (ref 0–0)
PCO2 BLDA: 38 MMHG — HIGH (ref 32–35)
PH BLDA: 7.44 — SIGNIFICANT CHANGE UP (ref 7.35–7.45)
PHOSPHATE SERPL-MCNC: 2 MG/DL — LOW (ref 2.5–4.5)
PHOSPHATE SERPL-MCNC: 2.2 MG/DL — LOW (ref 2.5–4.5)
PLATELET # BLD AUTO: 300 K/UL — SIGNIFICANT CHANGE UP (ref 150–400)
PLATELET # BLD AUTO: 318 K/UL — SIGNIFICANT CHANGE UP (ref 150–400)
PO2 BLDA: 75 MMHG — LOW (ref 83–108)
POTASSIUM SERPL-MCNC: 4.5 MMOL/L — SIGNIFICANT CHANGE UP (ref 3.5–5.3)
POTASSIUM SERPL-MCNC: 4.8 MMOL/L — SIGNIFICANT CHANGE UP (ref 3.5–5.3)
POTASSIUM SERPL-SCNC: 4.5 MMOL/L — SIGNIFICANT CHANGE UP (ref 3.5–5.3)
POTASSIUM SERPL-SCNC: 4.8 MMOL/L — SIGNIFICANT CHANGE UP (ref 3.5–5.3)
PROT SERPL-MCNC: 6.5 G/DL — SIGNIFICANT CHANGE UP (ref 6–8.3)
PROT SERPL-MCNC: 6.7 G/DL — SIGNIFICANT CHANGE UP (ref 6–8.3)
RBC # BLD: 3.02 M/UL — LOW (ref 3.8–5.2)
RBC # BLD: 3.2 M/UL — LOW (ref 3.8–5.2)
RBC # FLD: 17.2 % — HIGH (ref 10.3–14.5)
RBC # FLD: 17.3 % — HIGH (ref 10.3–14.5)
SAO2 % BLDA: 97.9 % — SIGNIFICANT CHANGE UP (ref 94–98)
SODIUM SERPL-SCNC: 129 MMOL/L — LOW (ref 135–145)
SODIUM SERPL-SCNC: 131 MMOL/L — LOW (ref 135–145)
WBC # BLD: 17.1 K/UL — HIGH (ref 3.8–10.5)
WBC # BLD: 19.17 K/UL — HIGH (ref 3.8–10.5)
WBC # FLD AUTO: 17.1 K/UL — HIGH (ref 3.8–10.5)
WBC # FLD AUTO: 19.17 K/UL — HIGH (ref 3.8–10.5)

## 2022-06-27 PROCEDURE — 93010 ELECTROCARDIOGRAM REPORT: CPT | Mod: 76

## 2022-06-27 PROCEDURE — 99024 POSTOP FOLLOW-UP VISIT: CPT

## 2022-06-27 PROCEDURE — 71045 X-RAY EXAM CHEST 1 VIEW: CPT | Mod: 26

## 2022-06-27 PROCEDURE — 99233 SBSQ HOSP IP/OBS HIGH 50: CPT

## 2022-06-27 PROCEDURE — 99232 SBSQ HOSP IP/OBS MODERATE 35: CPT

## 2022-06-27 RX ORDER — CALCIUM CHLORIDE
2 POWDER (GRAM) MISCELLANEOUS ONCE
Refills: 0 | Status: DISCONTINUED | OUTPATIENT
Start: 2022-06-27 | End: 2022-06-27

## 2022-06-27 RX ORDER — AMPICILLIN SODIUM AND SULBACTAM SODIUM 250; 125 MG/ML; MG/ML
3 INJECTION, POWDER, FOR SUSPENSION INTRAMUSCULAR; INTRAVENOUS ONCE
Refills: 0 | Status: COMPLETED | OUTPATIENT
Start: 2022-06-27 | End: 2022-06-27

## 2022-06-27 RX ORDER — ATROPINE SULFATE 0.1 MG/ML
1 SYRINGE (ML) INJECTION ONCE
Refills: 0 | Status: COMPLETED | OUTPATIENT
Start: 2022-06-27 | End: 2022-06-27

## 2022-06-27 RX ORDER — SODIUM CHLORIDE 9 MG/ML
500 INJECTION, SOLUTION INTRAVENOUS ONCE
Refills: 0 | Status: COMPLETED | OUTPATIENT
Start: 2022-06-27 | End: 2022-06-27

## 2022-06-27 RX ORDER — ERYTHROPOIETIN 10000 [IU]/ML
10000 INJECTION, SOLUTION INTRAVENOUS; SUBCUTANEOUS
Refills: 0 | Status: DISCONTINUED | OUTPATIENT
Start: 2022-06-27 | End: 2022-07-03

## 2022-06-27 RX ORDER — AMPICILLIN SODIUM AND SULBACTAM SODIUM 250; 125 MG/ML; MG/ML
3 INJECTION, POWDER, FOR SUSPENSION INTRAMUSCULAR; INTRAVENOUS EVERY 24 HOURS
Refills: 0 | Status: DISCONTINUED | OUTPATIENT
Start: 2022-06-28 | End: 2022-07-07

## 2022-06-27 RX ORDER — DOPAMINE HYDROCHLORIDE 40 MG/ML
5 INJECTION, SOLUTION, CONCENTRATE INTRAVENOUS
Qty: 400 | Refills: 0 | Status: DISCONTINUED | OUTPATIENT
Start: 2022-06-27 | End: 2022-06-28

## 2022-06-27 RX ORDER — CALCIUM CHLORIDE
1000 POWDER (GRAM) MISCELLANEOUS ONCE
Refills: 0 | Status: COMPLETED | OUTPATIENT
Start: 2022-06-27 | End: 2022-06-27

## 2022-06-27 RX ORDER — AMPICILLIN SODIUM AND SULBACTAM SODIUM 250; 125 MG/ML; MG/ML
INJECTION, POWDER, FOR SUSPENSION INTRAMUSCULAR; INTRAVENOUS
Refills: 0 | Status: DISCONTINUED | OUTPATIENT
Start: 2022-06-27 | End: 2022-07-07

## 2022-06-27 RX ORDER — GLUCAGON INJECTION, SOLUTION 0.5 MG/.1ML
3 INJECTION, SOLUTION SUBCUTANEOUS ONCE
Refills: 0 | Status: COMPLETED | OUTPATIENT
Start: 2022-06-27 | End: 2022-06-27

## 2022-06-27 RX ORDER — SODIUM BICARBONATE 1 MEQ/ML
50 SYRINGE (ML) INTRAVENOUS ONCE
Refills: 0 | Status: COMPLETED | OUTPATIENT
Start: 2022-06-27 | End: 2022-06-27

## 2022-06-27 RX ADMIN — HEPARIN SODIUM 5000 UNIT(S): 5000 INJECTION INTRAVENOUS; SUBCUTANEOUS at 21:13

## 2022-06-27 RX ADMIN — Medication 3 UNIT(S): at 07:58

## 2022-06-27 RX ADMIN — SODIUM CHLORIDE 500 MILLILITER(S): 9 INJECTION, SOLUTION INTRAVENOUS at 12:13

## 2022-06-27 RX ADMIN — HEPARIN SODIUM 5000 UNIT(S): 5000 INJECTION INTRAVENOUS; SUBCUTANEOUS at 06:27

## 2022-06-27 RX ADMIN — INSULIN GLARGINE 15 UNIT(S): 100 INJECTION, SOLUTION SUBCUTANEOUS at 22:23

## 2022-06-27 RX ADMIN — Medication 1 MILLIGRAM(S): at 11:15

## 2022-06-27 RX ADMIN — ERYTHROPOIETIN 10000 UNIT(S): 10000 INJECTION, SOLUTION INTRAVENOUS; SUBCUTANEOUS at 19:18

## 2022-06-27 RX ADMIN — CHLORHEXIDINE GLUCONATE 1 APPLICATION(S): 213 SOLUTION TOPICAL at 06:26

## 2022-06-27 RX ADMIN — GLUCAGON INJECTION, SOLUTION 3 MILLIGRAM(S): 0.5 INJECTION, SOLUTION SUBCUTANEOUS at 12:03

## 2022-06-27 RX ADMIN — ATORVASTATIN CALCIUM 40 MILLIGRAM(S): 80 TABLET, FILM COATED ORAL at 21:16

## 2022-06-27 RX ADMIN — Medication 667 MILLIGRAM(S): at 07:58

## 2022-06-27 RX ADMIN — Medication 50 MILLIEQUIVALENT(S): at 11:41

## 2022-06-27 RX ADMIN — Medication 1000 MILLIGRAM(S): at 12:09

## 2022-06-27 RX ADMIN — Medication 0.1 MILLIGRAM(S): at 06:29

## 2022-06-27 RX ADMIN — PANTOPRAZOLE SODIUM 40 MILLIGRAM(S): 20 TABLET, DELAYED RELEASE ORAL at 06:26

## 2022-06-27 RX ADMIN — DOPAMINE HYDROCHLORIDE 10.1 MICROGRAM(S)/KG/MIN: 40 INJECTION, SOLUTION, CONCENTRATE INTRAVENOUS at 10:56

## 2022-06-27 RX ADMIN — Medication 60 MILLIGRAM(S): at 06:26

## 2022-06-27 RX ADMIN — Medication 100 MILLIGRAM(S): at 06:26

## 2022-06-27 RX ADMIN — Medication 2: at 17:17

## 2022-06-27 RX ADMIN — AMPICILLIN SODIUM AND SULBACTAM SODIUM 3 GRAM(S): 250; 125 INJECTION, POWDER, FOR SUSPENSION INTRAMUSCULAR; INTRAVENOUS at 20:40

## 2022-06-27 RX ADMIN — Medication 3: at 07:57

## 2022-06-27 RX ADMIN — Medication 650 MILLIGRAM(S): at 08:00

## 2022-06-27 RX ADMIN — Medication 3: at 12:13

## 2022-06-27 RX ADMIN — Medication 650 MILLIGRAM(S): at 06:59

## 2022-06-27 NOTE — PROGRESS NOTE ADULT - ASSESSMENT
71 year old female w/ pmhx of A.fib rate controlled not on AC for hx of multiple falls, T2DM, HTN, HLD, ESRD on HD, CHF, s/p CABG admitted to ICU for    1. Cardiogenic Shock   2. Bradycardia, Junctional Rhythm  3. ESRD HD  4. R fem-BK pop w/PTFE                    5. Gangrene of R hallux toe s/p  amputation of R foot    Neuro:  - Avoid Neuro Deliriogenic / sedative medications    CV:  - Dopamine gtt ordered  - STAT EKG showing Junctional Rhythm  - Avoiding fluid overload  - Hold all Quentin Blockers  - ECHO 4/22 EF 45% with moderate MR  - Continue DAPT  - Cardiology following    Pulm:  - Supplemental oxygen as needed to maintain spo2 > 94%  - STAT CXR                  GI:  - Cont IV Protonix 40mg IVP Daily,   - Enteral feeds as tolerated to avoid Stress ulceration and optimize nutritional state when indicated    Renal:  - Even to net negative fluid balance as tolerated by hemodynamics and renal fx.    - Cr 3.2 Continue to monitor Bun/Cr  - Replacing electrolytes as needed with Goal K> 4, PO> 3, Mg> 2               - Strict I&O's  - Avoid Nephro toxic medication  - Renally dose meds  - HD TTS Schedule    Heme:  - Heparin for DVT prophylaxis    ID:  - WBC 19.17,  remains afebrile with no antipyretics administered   - Ceftriaxone  - Microbiology and Radiology reviewed   - trend CBC with diff, CMP  and fever curve  - Hx of L medial malleolus growing klebsiella oxytoca/raoutella oxytoca, E. coli, MSSA. Recent blood cultures negative.  - Bone culture growing MSSA    Endo:  - ISS for aggressive glycemic control to limit FS glucose to < 180mg/dl.      Critical Care Time (EXCLUSIVE of any non bundled procedures) :  38 minutes were spent assessing the patient's presenting problems of acute illness that pose a high probability of life threatening  deterioration or end organ damage / dysfunction.  Medical desicion making includes initiation / continuation of plan or care review data/ labwork/ radiographic study, direct patient care bedside ,  discussions with  consultants regarding care,  evaluation and interpretation of vital signs, any necessary ventilator management,   NIV or BIPAP changes  or initiation,    discussions with multidisipliary team,  am or pm rounds, discussions of goals of care with patient and family all non-inclusive of procedures.    Plan discussed with Dr Coppola 71 year old female w/ pmhx of A.fib rate controlled not on AC for hx of multiple falls, T2DM, HTN, HLD, ESRD on HD, CHF, s/p CABG admitted to ICU for    1. Cardiogenic Shock   2. Bradycardia, Junctional Rhythm  3. ESRD HD  4. R fem-BK pop w/PTFE                    5. Gangrene of R hallux toe s/p  amputation of toe  6. HFrEF  7. AFIB  8. DM2      Neuro:  - Avoid Neuro Deliriogenic / sedative medications    CV:  - Dopamine gtt ordered  - STAT EKG showing Junctional Rhythm  - Avoiding fluid overload  - Hold all Quentin Blockers/antihypertensives  - ECHO 4/22 EF 45% with moderate MR  - Continue DAPT  - Cardiology following    Pulm:  - Supplemental oxygen as needed to maintain spo2 > 94%  - STAT CXR                  GI:  - Continue current diet    Renal:  - Even to net negative fluid balance as tolerated by hemodynamics and renal fx.    - Cr 3.2 Continue to monitor Bun/Cr  - Replacing electrolytes as needed with Goal K> 4, PO> 3, Mg> 2               - Strict I&O's  - Avoid Nephro toxic medication  - Renally dose meds  - HD TTS Schedule    Heme:  - Heparin for DVT prophylaxis    ID:  - WBC 19.17,  remains afebrile with no antipyretics administered   - Ceftriaxone  - Microbiology and Radiology reviewed   - trend CBC with diff, CMP  and fever curve  - Hx of L medial malleolus growing klebsiella oxytoca/raoutella oxytoca, E. coli, MSSA. Recent blood cultures negative.  - Bone culture growing MSSA    Endo:  - ISS for aggressive glycemic control to limit FS glucose to < 180mg/dl.      Critical Care Time (EXCLUSIVE of any non bundled procedures) :  38 minutes were spent assessing the patient's presenting problems of acute illness that pose a high probability of life threatening  deterioration or end organ damage / dysfunction.  Medical desicion making includes initiation / continuation of plan or care review data/ labwork/ radiographic study, direct patient care bedside ,  discussions with  consultants regarding care,  evaluation and interpretation of vital signs, any necessary ventilator management,   NIV or BIPAP changes  or initiation,    discussions with multidisipliary team,  am or pm rounds, discussions of goals of care with patient and family all non-inclusive of procedures.    Plan discussed with Dr Coppola 71 year old female w/ pmhx of A.fib rate controlled not on AC for hx of multiple falls, T2DM, HTN, HLD, ESRD on HD, CHF, s/p CABG admitted to ICU for    1. Cardiogenic Shock   2. Bradycardia, Junctional Rhythm/AV quentin disassociation  3. ESRD HD  4. R fem-BK pop w/PTFE                    5. Gangrene of R hallux toe s/p  amputation of toe  6. HFrEF  7. AFIB  8. DM2      Neuro:  - Avoid Neuro Deliriogenic / sedative medications    CV:  - Dopamine gtt ordered  - STAT EKG showing Junctional Rhythm/ AV quentin diassociation  - Avoiding fluid overload  - Hold all Quentin Blockers/antihypertensives  - ECHO 4/22 EF 45% with moderate MR  - Continue DAPT  - Cardiology following    Pulm:  - Supplemental oxygen as needed to maintain spo2 > 94%  - STAT CXR                  GI:  - Continue current diet    Renal:  - Even to net negative fluid balance as tolerated by hemodynamics and renal fx.    - Cr 3.2 Continue to monitor Bun/Cr  - Replacing electrolytes as needed with Goal K> 4, PO> 3, Mg> 2               - Strict I&O's  - Avoid Nephro toxic medication  - Renally dose meds  - HD TTS Schedule    Heme:  - Heparin for DVT prophylaxis    ID:  - WBC 19.17,  remains afebrile with no antipyretics administered   - Ceftriaxone  - Microbiology and Radiology reviewed   - trend CBC with diff, CMP  and fever curve  - Hx of L medial malleolus growing klebsiella oxytoca/raoutella oxytoca, E. coli, MSSA. Recent blood cultures negative.  - Bone culture growing MSSA    Endo:  - ISS for aggressive glycemic control to limit FS glucose to < 180mg/dl.      Critical Care Time (EXCLUSIVE of any non bundled procedures) :  38 minutes were spent assessing the patient's presenting problems of acute illness that pose a high probability of life threatening  deterioration or end organ damage / dysfunction.  Medical desicion making includes initiation / continuation of plan or care review data/ labwork/ radiographic study, direct patient care bedside ,  discussions with  consultants regarding care,  evaluation and interpretation of vital signs, any necessary ventilator management,   NIV or BIPAP changes  or initiation,    discussions with multidisipliary team,  am or pm rounds, discussions of goals of care with patient and family all non-inclusive of procedures.    Plan discussed with Dr Coppola

## 2022-06-27 NOTE — PROGRESS NOTE ADULT - PROBLEM SELECTOR PLAN 1
S/p Right hallux amputation POD1, (DOS: 6/23/22)  - s/p RLE bypass graft  - Pt seen and evaluated.  -  Dressing changed with acticoat and DSD  - Continue IV abx per ID  - Continue vascular recs  - Continue med management   - OR culture Staph Aureus & E. faecalis  - OR pathology: right 1st met clean margin: Minimal chronic osteomyelitis  - Pt to partial weight bear to the right heel as tolerated with assisted device (pt has a history of falls)  - No further podiatric intervention at this time. Pt is stable from out standpoint    WOUND CARE INSTRUCTIONS  1. Please leave dressing clean, dry and intact until follow-up. Monitor for any signs of infection and present to the ED if they arise.  2. If the dressing gets wet, please change with dry, sterile dressing.  3. Patient to be partial weight bear to the right heel as tolerated with assisted device (pt has a history of falls)  4. Patient is to follow up [facility] with Dr. Lau or Dr. Pastor within 3-5 days upon discharge. Please call [phone] as soon as discharged and state that it is for a "post-op appointment".

## 2022-06-27 NOTE — PROGRESS NOTE ADULT - ASSESSMENT
Patient is a 71 year old female with PMH of A.fib rate controlled not on AC for hx of multiple falls, T2DM, HTN, HLD, ESRD on HD, CHF, s/p CABG brought in by EMS for generalized weakness at home. Patient states that she was doing well when in the afternoon she tried to get up from her couch and felt weak in the LE and fell back in her couch. Denies any hx of head trauma or loss of consciousness. Denies any weakness or numbness. Denies any chest pain, SOB or palpitations. No fever, cough or chills. No hx of recent travel or sick contacts. At the time of EMS arrival patient was found to have POCBS of 50. EMS gave dextrose and on arrival to the ED patient blood sugar was found to be in 30's with hypothermia of 94.9.    ED course:   Vitals:   BP: 176/85  HR:76  Temp:94.2  RR:18, O2:96% on RA   Significant Labs:   CBC: WBC:16.82 Hb:13.2 , Platlets: 260, Neutro:89   CMP: Lytes: WNL, BUN/Creatinine:48/4.8, Glucose:134 , Alkaline Phos:128, AST, 41, eGFR: 9, HsTrop: 275.9, ProBNP: 008051, Lactate: 2  UA; negative  CXR: Heart and lung appear normal (self read)  CT BRAIN: No acute intracranial bleeding. Central volume loss, chronic microvascular ischemic changes, and chronic bilateral lacunar infarctions.  CT CERVICAL SPINE: No fracture. Grade 1 C4-C5 anterolisthesis. C6-C7 disc degeneration. Bilateral pleural effusions and interlobular septal thickening due to   interstitial edema.  EKG: NSR, left axis deviation, HR 71,   QT/QTc: 426/462  Patient received: Ceftriaxone 1 g x1, Dextrose 5% + NS 1L x1, Dextrose 50% IV X1, heating blanket, 2L NC   (16 Jun 2022 01:19)      rrt called transfer to [facility]     esrd on hd plan for hd as order        ANEMIA PLAN:  Anemia of chronic disease:  We will continue epo  aiming for a HCT of 32-36 %.   We will monitor Iron stores, B12 and RBC folate .    BP monitoring,continue current antihypertensive meds, low salt diet  metoprolol tartrate 100 milliGRAM(s) Oral every 12 hours  cloNIDine 0.1 milliGRAM(s) Oral two times a day    f/u  blood and urine cx,serial lactate levels,monitor vitals closley,ivfs hydration,monitor urine output and renal profile,iv abx  cefTRIAXone   IVPB 2000 milliGRAM(s) IV Intermittent every 24 hours      dvt heparin   Injectable 5000 Unit(s) SubCutaneous every 12 hours

## 2022-06-27 NOTE — CHART NOTE - NSCHARTNOTEFT_GEN_A_CORE
Called by RN pt obtunded and agonally breathing. Pt immediately Bagged with BVM to spo2 100%, Pt became responsive, following commands, maintaining airway, no need for emergent intubation. Pt bradycardic, Dopamine gtt increased, Cardio at bedside, push 1 amp atropine. Atropine with positive response pt in NSR indicating Sick Sinus node. STAT ABG, CBC, CMP, Mag, Phos, Lactate, Blood Cx, 500 mL Bolus LR, 1 amp bicarb and glucagon to be given for potential CCB overdose.

## 2022-06-27 NOTE — PROGRESS NOTE ADULT - SUBJECTIVE AND OBJECTIVE BOX
Patient is a 71y Female whom presented to the hospital with esrd on hd     PAST MEDICAL & SURGICAL HISTORY:  Diabetes mellitus II      HTN (hypertension)      h/o Anxiety attack      Depression      h/o Myocardial infarct 2007      CAD (coronary artery disease)      h/o Hepatitis A 1969  currently resolved, no symptoms      PAD (peripheral artery disease)      Murmur, cardiac      h/o Smoking  quitted 3/2012      CRF (chronic renal failure), unspecified stage      Dialysis patient      Anemia secondary to renal failure      HTN (hypertension)      coronary stent 2007      s/p Ovarian cyst removal      s/p surgical removal of benign Skin lesion epigastric area          MEDICATIONS  (STANDING):  amLODIPine   Tablet 5 milliGRAM(s) Oral daily  aspirin enteric coated 81 milliGRAM(s) Oral daily  atorvastatin 40 milliGRAM(s) Oral at bedtime  calcium acetate 667 milliGRAM(s) Oral three times a day with meals  cefTRIAXone   IVPB 1000 milliGRAM(s) IV Intermittent every 24 hours  cloNIDine 0.1 milliGRAM(s) Oral two times a day  clopidogrel Tablet 75 milliGRAM(s) Oral daily  dextrose 5%. 1000 milliLiter(s) (100 mL/Hr) IV Continuous <Continuous>  dextrose 5%. 1000 milliLiter(s) (50 mL/Hr) IV Continuous <Continuous>  dextrose 50% Injectable 25 Gram(s) IV Push once  dextrose 50% Injectable 12.5 Gram(s) IV Push once  dextrose 50% Injectable 25 Gram(s) IV Push once  diltiazem    Tablet 60 milliGRAM(s) Oral every 8 hours  glucagon  Injectable 1 milliGRAM(s) IntraMuscular once  heparin   Injectable 5000 Unit(s) SubCutaneous every 12 hours  imipramine 50 milliGRAM(s) Oral daily  insulin glargine Injectable (LANTUS) 12 Unit(s) SubCutaneous at bedtime  insulin lispro (ADMELOG) corrective regimen sliding scale   SubCutaneous three times a day before meals  insulin lispro (ADMELOG) corrective regimen sliding scale   SubCutaneous at bedtime  metoprolol tartrate 100 milliGRAM(s) Oral every 12 hours  pantoprazole    Tablet 40 milliGRAM(s) Oral before breakfast  povidone iodine 10% Solution 1 Application(s) Topical daily      Allergies    latex (Unknown)  No Known Drug Allergies    Intolerances        SOCIAL HISTORY:  Denies ETOh,Smoking,     FAMILY HISTORY:  No pertinent family history in first degree relatives        REVIEW OF SYSTEMS:    CONSTITUTIONAL: No weakness, fevers or chills  RESPIRATORY: No cough, wheezing, hemoptysis; No shortness of breath  CARDIOVASCULAR: No chest pain or palpitations  GASTROINTESTINAL: No abdominal or epigastric pain. No nausea, vomiting,     No diarrhea or constipation. No melena   SKIN: dry                                                                                8.7    17.10 )-----------( 318      ( 27 Jun 2022 11:40 )             27.6       CBC Full  -  ( 27 Jun 2022 11:40 )  WBC Count : 17.10 K/uL  RBC Count : 3.02 M/uL  Hemoglobin : 8.7 g/dL  Hematocrit : 27.6 %  Platelet Count - Automated : 318 K/uL  Mean Cell Volume : 91.4 fl  Mean Cell Hemoglobin : 28.8 pg  Mean Cell Hemoglobin Concentration : 31.5 gm/dL  Auto Neutrophil # : x  Auto Lymphocyte # : x  Auto Monocyte # : x  Auto Eosinophil # : x  Auto Basophil # : x  Auto Neutrophil % : x  Auto Lymphocyte % : x  Auto Monocyte % : x  Auto Eosinophil % : x  Auto Basophil % : x      06-27    129<L>  |  92<L>  |  73<H>  ----------------------------<  264<H>  4.5   |  27  |  6.30<H>    Ca    12.3<H>      27 Jun 2022 11:40  Phos  2.2     06-27  Mg     2.9     06-27    TPro  6.5  /  Alb  2.3<L>  /  TBili  0.6  /  DBili  x   /  AST  32  /  ALT  10<L>  /  AlkPhos  105  06-27      CAPILLARY BLOOD GLUCOSE      POCT Blood Glucose.: 212 mg/dL (27 Jun 2022 17:11)  POCT Blood Glucose.: 254 mg/dL (27 Jun 2022 11:46)  POCT Blood Glucose.: 257 mg/dL (27 Jun 2022 09:57)  POCT Blood Glucose.: 283 mg/dL (27 Jun 2022 07:27)      Vital Signs Last 24 Hrs  T(C): 36.4 (27 Jun 2022 21:00), Max: 37.7 (26 Jun 2022 21:37)  T(F): 97.6 (27 Jun 2022 21:00), Max: 99.9 (26 Jun 2022 21:37)  HR: 82 (27 Jun 2022 21:00) (39 - 85)  BP: 156/90 (27 Jun 2022 21:00) (81/40 - 178/98)  BP(mean): 100 (27 Jun 2022 20:00) (57 - 119)  RR: 23 (27 Jun 2022 21:00) (12 - 38)  SpO2: 97% (27 Jun 2022 21:00) (89% - 99%)                PHYSICAL EXAM:    Constitutional: NAD  HEENT: conjunctive   clear   Neck:  No JVD  Respiratory: CTAB  Cardiovascular: S1 and S2  Gastrointestinal: BS+, soft, NT/ND  Skin: dry

## 2022-06-27 NOTE — PROGRESS NOTE ADULT - SUBJECTIVE AND OBJECTIVE BOX
Patient is a 71y old  Female who presents with a chief complaint of SIRS (27 Jun 2022 09:55)      BRIEF HOSPITAL COURSE: 72 yo woman with PMH of HTN, DM2, CAD, CHF, PAD, A.fib, multiple falls and ESRD on HD was admitted on 6/16, came to ED with generalized weakness. Pt was hypoglycemic and hypothermic on admission c2/2 sepsis/SIRS. Source likely sec to Right hallux with a dry gangrene and superimposed infection.     Now S/P R fem-BK pop w/PTFE- POD#6  Now S/P Partial ray amputation of R foot - POD # 4    Events last 24 hours: Rapid response called for symptomatic Bradycardia. Upon in initial evaluation pt appeared to be in junctional rhythm in the 30-40's, hypotensive in the 80's with manual BP. Dopamine gtt ordered and pt to be admitted to ICU. See rapid response note for details.    PAST MEDICAL & SURGICAL HISTORY:  Diabetes mellitus II      HTN (hypertension)      h/o Anxiety attack      Depression      h/o Myocardial infarct 2007      CAD (coronary artery disease)      h/o Hepatitis A 1969  currently resolved, no symptoms      PAD (peripheral artery disease)      Murmur, cardiac      h/o Smoking  quitted 3/2012      CRF (chronic renal failure), unspecified stage      Dialysis patient      Anemia secondary to renal failure      HTN (hypertension)      coronary stent 2007      s/p Ovarian cyst removal      s/p surgical removal of benign Skin lesion epigastric area      Review of Systems:  CONSTITUTIONAL: No fever, chills, positive fatigue.  EYES: No eye pain, visual disturbances, or discharge.  ENMT:  No difficulty hearing, tinnitus, or vertigo. No sinus or throat pain.  NECK: No pain or stiffness.  RESPIRATORY: No shortness of breath, cough, or wheezing.  CARDIOVASCULAR: No chest pain, palpitations, dizziness, or leg swelling.  GASTROINTESTINAL: No abdominal or epigastric pain. No nausea, vomiting, diarrhea, or constipation. No hematemesis, melena, or hematochezia.  GENITOURINARY: No dysuria, increased frequency, hematuria, or incontinence.  NEUROLOGICAL: No headaches, memory loss, loss of strength, numbness, or tremors.  SKIN: No itching, burning, rashes, or lesions.  MUSCULOSKELETAL: No joint pain or swelling. No muscle, back, or extremity pain.  PSYCHIATRIC: No depression, anxiety, mood swings, or difficulty sleeping.    Medications:  cefTRIAXone   IVPB 2000 milliGRAM(s) IV Intermittent every 24 hours    cloNIDine 0.1 milliGRAM(s) Oral two times a day  DOPamine Infusion 5 MICROgram(s)/kG/Min IV Continuous <Continuous>    diphenhydrAMINE Injectable 25 milliGRAM(s) IV Push <User Schedule> PRN    acetaminophen     Tablet .. 650 milliGRAM(s) Oral every 6 hours PRN  imipramine 50 milliGRAM(s) Oral daily  melatonin 3 milliGRAM(s) Oral at bedtime PRN      aspirin enteric coated 81 milliGRAM(s) Oral daily  clopidogrel Tablet 75 milliGRAM(s) Oral daily  heparin   Injectable 5000 Unit(s) SubCutaneous every 12 hours    aluminum hydroxide/magnesium hydroxide/simethicone Suspension 30 milliLiter(s) Oral every 4 hours PRN  pantoprazole    Tablet 40 milliGRAM(s) Oral before breakfast      atorvastatin 40 milliGRAM(s) Oral at bedtime  dextrose 50% Injectable 25 Gram(s) IV Push once  dextrose Oral Gel 15 Gram(s) Oral once PRN  glucagon  Injectable 1 milliGRAM(s) IntraMuscular once  insulin glargine Injectable (LANTUS) 15 Unit(s) SubCutaneous at bedtime  insulin lispro (ADMELOG) corrective regimen sliding scale   SubCutaneous three times a day before meals  insulin lispro Injectable (ADMELOG) 3 Unit(s) SubCutaneous three times a day before meals    calcium acetate 667 milliGRAM(s) Oral three times a day with meals  dextrose 5%. 1000 milliLiter(s) IV Continuous <Continuous>    epoetin fawad-epbx (RETACRIT) Injectable 57071 Unit(s) IV Push <User Schedule>    chlorhexidine 4% Liquid 1 Application(s) Topical <User Schedule>            ICU Vital Signs Last 24 Hrs  T(C): 36.9 (27 Jun 2022 06:22), Max: 37.7 (26 Jun 2022 21:37)  T(F): 98.4 (27 Jun 2022 06:22), Max: 99.9 (26 Jun 2022 21:37)  HR: 85 (27 Jun 2022 06:22) (63 - 85)  BP: 150/62 (27 Jun 2022 06:22) (122/63 - 150/62)  BP(mean): --  ABP: --  ABP(mean): --  RR: 18 (27 Jun 2022 06:22) (18 - 18)  SpO2: 95% (27 Jun 2022 06:22) (91% - 95%)          I&O's Detail      LABS:                        9.2    19.17 )-----------( 300      ( 27 Jun 2022 06:58 )             29.5     06-27    131<L>  |  92<L>  |  71<H>  ----------------------------<  279<H>  4.8   |  28  |  6.20<H>    Ca    10.2<H>      27 Jun 2022 06:58  Phos  2.0     06-27  Mg     2.7     06-27    TPro  6.7  /  Alb  2.4<L>  /  TBili  0.4  /  DBili  x   /  AST  27  /  ALT  7<L>  /  AlkPhos  107  06-27          CAPILLARY BLOOD GLUCOSE      POCT Blood Glucose.: 257 mg/dL (27 Jun 2022 09:57)        CULTURES:  Culture Results:   Few Staphylococcus aureus  Rare Enterococcus faecalis (06-23 @ 15:35)      Physical Examination:      General: Ill, tired appearing    HEENT: Pupils equal, reactive to light. Symmetric. No scleral icterus or injection.    PULM: Clear to auscultation B/L. No wheezes, rales, or rhonchi appreciated No significant sputum production or increased respiratory effort.    NECK: Supple, no lymphadenopathy, trachea midline.    CVS: Regular rate and rhythm, no murmurs appreciated, +s1/s2.    ABD: Soft, nondistended, nontender, normoactive bowel sounds.    EXT: No edema, nontender.    SKIN: Warm and well perfused, no rashes noted.    NEURO: Alert, confused, interactive, nonfocal.    RADIOLOGY: < from: CT Chest No Cont (06.16.22 @ 08:16) >  C: [id number] EXAM:  CT CHEST                          PROCEDURE DATE:  06/16/2022          INTERPRETATION:  INDICATION: Pleural effusion  TECHNIQUE: Unenhanced CT of the chest. Coronal, sagittal, and MIP images   were reconstructed and reviewed.  COMPARISON: 4/6/2022 chest CT.    FINDINGS:    AIRWAYS, LUNGS and PLEURA: Patent central airways. Small layering   bilateral pleural effusions decreased from 4/6/2022. Mild bibasilar   atelectasis. 7 mm groundglass nodule within superior segment of left   lower lobe (series 2 image 44). The lungs are otherwise clear. No pleural   effusion.    MEDIASTINUM AND ZOHAIB: No lymphadenopathy.    HEART AND VESSELS: Cardiomegaly. Coronary stents/calcifications. No   pericardial effusion. Thoracic aorta and pulmonary artery normal in   diameter.    VISUALIZED UPPER ABDOMEN: Extensive arterial calcifications.    CHEST WALL AND BONES: No aggressive osseous lesion.    LOWER NECK: Within normal limits.    IMPRESSION:    Small layering bilateral pleural effusions decreased from 4/6/2022.    7 mm groundglass nodule within left lower lobe (series 2 image 44).   Recommend follow-up chest CT in 12 months to determine stability.    --- End of Report ---    < end of copied text >

## 2022-06-27 NOTE — PROGRESS NOTE ADULT - SUBJECTIVE AND OBJECTIVE BOX
[facility]  INFECTIOUS DISEASES   [address] [place] [address]  Tel: [phone]     Fax: [phone]  ======================================================  MD Zita Alejandra Kaushal, MD Cho, Michelle, MD   ======================================================    [id number]  SHILO KOHLER     Follow up: R foot wound/gangrene     Had fem/pop on 6/21, No fever, on 6/23 had amputation.   Today has RRT for bradycardia so was transferred to MICU.     PAST MEDICAL & SURGICAL HISTORY:  Diabetes mellitus II  HTN (hypertension)  h/o Anxiety attack  Depression  h/o Myocardial infarct 2007  CAD (coronary artery disease)  h/o Hepatitis A 1969  currently resolved, no symptoms  PAD (peripheral artery disease)  Murmur, cardiac  h/o Smoking  quitted 3/2012  CRF (chronic renal failure), unspecified stage  Dialysis patient  Anemia secondary to renal failure  HTN (hypertension)  coronary stent 2007  s/p Ovarian cyst removal  s/p surgical removal of benign Skin lesion epigastric area    Social Hx: no current smoking, ETOH or drugs     FAMILY HISTORY:  No pertinent family history in first degree relatives    Allergies  latex (Unknown)  No Known Drug Allergies    Antibiotics:  zosyn     REVIEW OF SYSTEMS:  CONSTITUTIONAL:  No Fever or chills  HEENT:  No diplopia or blurred vision.  No sore throat or runny nose.  CARDIOVASCULAR:  No chest pain or SOB.  RESPIRATORY:  No cough, shortness of breath, PND or orthopnea.  GASTROINTESTINAL:  No nausea, vomiting or diarrhea.  GENITOURINARY:  No dysuria, frequency or urgency. No Blood in urine  MUSCULOSKELETAL:  pain at surgical site   SKIN:  R foot wound   NEUROLOGIC:  No paresthesias, fasciculations, seizures or weakness.  PSYCHIATRIC:  No disorder of thought or mood.  ENDOCRINE:  No heat or cold intolerance, polyuria or polydipsia.  HEMATOLOGICAL:  No easy bruising or bleeding.     Physical Exam:  Vital Signs Last 24 Hrs  T(C): 36.1 (27 Jun 2022 12:00), Max: 37.7 (26 Jun 2022 21:37)  T(F): 97 (27 Jun 2022 12:00), Max: 99.9 (26 Jun 2022 21:37)  HR: 63 (27 Jun 2022 13:30) (39 - 85)  BP: 110/52 (27 Jun 2022 13:30) (81/40 - 150/62)  BP(mean): 75 (27 Jun 2022 13:30) (57 - 79)  RR: 13 (27 Jun 2022 13:30) (13 - 26)  SpO2: 94% (27 Jun 2022 13:30) (91% - 98%)  GEN: NAD  HEENT: normocephalic and atraumatic. EOMI. PERRL.    NECK: Supple.  No lymphadenopathy   LUNGS: Clear to auscultation.  HEART: Irregular rate and rhythm   ABDOMEN: Soft, nontender, and nondistended.  Positive bowel sounds.    : No CVA tenderness  EXTREMITIES: Now with 2 long wounds in medial side of leg after vascular surgery looks clean  R foot dressed after amputation   NEUROLOGIC: grossly intact.  PSYCHIATRIC: Appropriate affect .  SKIN: No rash       Labs:                        8.7    17.10 )-----------( 318      ( 27 Jun 2022 11:40 )             27.6     06-27    129<L>  |  92<L>  |  73<H>  ----------------------------<  264<H>  4.5   |  27  |  6.30<H>    Ca    12.3<H>      27 Jun 2022 11:40  Phos  2.2     06-27  Mg     2.9     06-27    TPro  6.5  /  Alb  2.3<L>  /  TBili  0.6  /  DBili  x   /  AST  32  /  ALT  10<L>  /  AlkPhos  105  06-27    Culture - Tissue with Gram Stain (collected 06-23-22 @ 15:35)  Source: .Tissue Other, right hallux bone culture  Gram Stain (06-24-22 @ 04:37):    Rare polymorphonuclear leukocytes seen per low power field    Few Gram positive cocci in pairs seen per oil power field  Organism: Staphylococcus aureus  Enterococcus faecalis (06-26-22 @ 17:32)  Organism: Enterococcus faecalis (06-26-22 @ 17:32)    Sensitivities:      -  Ampicillin: S <=2 Predicts results to ampicillin/sulbactam, amoxacillin-clavulanate and  piperacillin-tazobactam.      -  Tetra/Doxy: R >8      -  Vancomycin: S 2      Method Type: SONIA  Organism: Staphylococcus aureus (06-25-22 @ 16:36)    Sensitivities:      -  Ampicillin/Sulbactam: S <=8/4      -  Cefazolin: S <=4      -  Clindamycin: R <=0.25 This isolate is presumed to be clindamycin resistant based on detection of inducible resistance. Clindamycin may still be effective in some patients.      -  Erythromycin: R >4      -  Gentamicin: S <=1 Should not be used as monotherapy      -  Oxacillin: S <=0.25 Oxacillin predicts susceptibility for dicloxacillin, methicillin, and nafcillin      -  Penicillin: R >8      -  Rifampin: S <=1 Should not be used as monotherapy      -  Tetra/Doxy: S <=1      -  Trimethoprim/Sulfamethoxazole: S <=0.5/9.5      -  Vancomycin: S 1      Method Type: SONIA    Culture - Urine (collected 06-16-22 @ 03:40)  Source: Clean Catch Clean Catch (Midstream)  Final Report (06-17-22 @ 07:32):    <10,000 CFU/mL Normal Urogenital Shantel    Culture - Blood (collected 06-16-22 @ 03:38)  Source: .Blood Blood-Peripheral  Final Report (06-21-22 @ 04:00):    No Growth Final    Culture - Blood (collected 06-16-22 @ 03:38)  Source: .Blood Blood-Peripheral  Final Report (06-21-22 @ 04:00):    No Growth Final    WBC Count: 17.10 K/uL (06-27-22 @ 11:40)  WBC Count: 19.17 K/uL (06-27-22 @ 06:58)  WBC Count: 14.03 K/uL (06-25-22 @ 06:05)  WBC Count: 14.26 K/uL (06-24-22 @ 08:07)  WBC Count: 15.45 K/uL (06-23-22 @ 07:20)    Creatinine, Serum: 6.30 mg/dL (06-27-22 @ 11:40)  Creatinine, Serum: 6.20 mg/dL (06-27-22 @ 06:58)  Creatinine, Serum: 3.80 mg/dL (06-25-22 @ 06:05)  Creatinine, Serum: 5.10 mg/dL (06-24-22 @ 08:07)  Creatinine, Serum: 4.05 mg/dL (06-23-22 @ 07:20)    C-Reactive Protein, Serum: 19 mg/L (06-17-22 @ 10:38)    Sedimentation Rate, Erythrocyte: 13 mm/hr (06-17-22 @ 07:52)    COVID-19 PCR: NotDetec (06-23-22 @ 11:42)  COVID-19 PCR: NotDetec (06-20-22 @ 08:14)  SARS-CoV-2: NotDetec (06-15-22 @ 22:44)    All imaging and other data have been reviewed.  < from: CT Chest No Cont (06.16.22 @ 08:16) >  IMPRESSION:  Small layering bilateral pleural effusions decreased from 4/6/2022.  7 mm groundglass nodule within left lower lobe (series 2 image 44).   Recommend follow-up chest CT in 12 months to determine stability.    Assessment and Plan:   70 yo woman with PMH of HTN, DM2, CAD, CHF, A.fib, multiple falls and ESRD on HD was admitted on 6/16, came to ED with generalized weakness.   Her hypoglycemia and hypothermia on admission could be related to sepsis/SIRS source unclear at this time, could be foot infection? Left hallux looks like  a dry gangrene with superimposed infection.   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 She had Tissue cultures from 3/30/22 grew   klebsiella oxytoca/raoutella oxytoca, E. coli, MSSA so Cefazolin was given after HD to complete the course of treatment.   ESR 13 and CRP 19  MRSA PCR negative   6/21 s/p Femoral popliteal bypass with prosthetic graft  6/23 s/p R 1st toe ray amputation   Wound culture from OR with MSSA  6/27 RRT for symptomatic bradycardia transferred to MICU    Recommendations:  - Blood culture x 2 NGTD  - leukocytosis could be reactive   - Vascular surgery and podiatry follow ups noted   - Follow up cultures sent from OR on 6/23 now growing MSSA  - Follow up pathology report  - Will continue ceftriaxone 2gm based on old culture and new MSSA culture     Will follow PRN.    Geovany Long MD  Division of Infectious Diseases   Please call ID service at [phone] with any question.      35 minutes spent on total encounter assessing patient, examination, chart reivew, counseling and coordinating care by the attending physician/nurse/care manager.   [facility]  INFECTIOUS DISEASES   [address] [place] [address]  Tel: [phone]     Fax: [phone]  ======================================================  MD Zita Alejandra Kaushal, MD Cho, Michelle, MD   ======================================================    [id number]  SHILO KOHLER     Follow up: R foot wound/gangrene     Had fem/pop on 6/21, No fever, on 6/23 had amputation.   Today has RRT for bradycardia so was transferred to MICU.     PAST MEDICAL & SURGICAL HISTORY:  Diabetes mellitus II  HTN (hypertension)  h/o Anxiety attack  Depression  h/o Myocardial infarct 2007  CAD (coronary artery disease)  h/o Hepatitis A 1969  currently resolved, no symptoms  PAD (peripheral artery disease)  Murmur, cardiac  h/o Smoking  quitted 3/2012  CRF (chronic renal failure), unspecified stage  Dialysis patient  Anemia secondary to renal failure  HTN (hypertension)  coronary stent 2007  s/p Ovarian cyst removal  s/p surgical removal of benign Skin lesion epigastric area    Social Hx: no current smoking, ETOH or drugs     FAMILY HISTORY:  No pertinent family history in first degree relatives    Allergies  latex (Unknown)  No Known Drug Allergies    Antibiotics:  zosyn     REVIEW OF SYSTEMS:  CONSTITUTIONAL:  No Fever or chills  HEENT:  No diplopia or blurred vision.  No sore throat or runny nose.  CARDIOVASCULAR:  No chest pain or SOB.  RESPIRATORY:  No cough, shortness of breath, PND or orthopnea.  GASTROINTESTINAL:  No nausea, vomiting or diarrhea.  GENITOURINARY:  No dysuria, frequency or urgency. No Blood in urine  MUSCULOSKELETAL:  pain at surgical site   SKIN:  R foot wound   NEUROLOGIC:  No paresthesias, fasciculations, seizures or weakness.  PSYCHIATRIC:  No disorder of thought or mood.  ENDOCRINE:  No heat or cold intolerance, polyuria or polydipsia.  HEMATOLOGICAL:  No easy bruising or bleeding.     Physical Exam:  Vital Signs Last 24 Hrs  T(C): 36.1 (27 Jun 2022 12:00), Max: 37.7 (26 Jun 2022 21:37)  T(F): 97 (27 Jun 2022 12:00), Max: 99.9 (26 Jun 2022 21:37)  HR: 63 (27 Jun 2022 13:30) (39 - 85)  BP: 110/52 (27 Jun 2022 13:30) (81/40 - 150/62)  BP(mean): 75 (27 Jun 2022 13:30) (57 - 79)  RR: 13 (27 Jun 2022 13:30) (13 - 26)  SpO2: 94% (27 Jun 2022 13:30) (91% - 98%)  GEN: NAD  HEENT: normocephalic and atraumatic. EOMI. PERRL.    NECK: Supple.  No lymphadenopathy   LUNGS: Clear to auscultation.  HEART: Irregular rate and rhythm   ABDOMEN: Soft, nontender, and nondistended.  Positive bowel sounds.    : No CVA tenderness  EXTREMITIES: Now with 2 long wounds in medial side of leg after vascular surgery looks clean  R foot dressed after amputation   NEUROLOGIC: grossly intact.  PSYCHIATRIC: Appropriate affect .  SKIN: No rash       Labs:                        8.7    17.10 )-----------( 318      ( 27 Jun 2022 11:40 )             27.6     06-27    129<L>  |  92<L>  |  73<H>  ----------------------------<  264<H>  4.5   |  27  |  6.30<H>    Ca    12.3<H>      27 Jun 2022 11:40  Phos  2.2     06-27  Mg     2.9     06-27    TPro  6.5  /  Alb  2.3<L>  /  TBili  0.6  /  DBili  x   /  AST  32  /  ALT  10<L>  /  AlkPhos  105  06-27    Culture - Tissue with Gram Stain (collected 06-23-22 @ 15:35)  Source: .Tissue Other, right hallux bone culture  Gram Stain (06-24-22 @ 04:37):    Rare polymorphonuclear leukocytes seen per low power field    Few Gram positive cocci in pairs seen per oil power field  Organism: Staphylococcus aureus  Enterococcus faecalis (06-26-22 @ 17:32)  Organism: Enterococcus faecalis (06-26-22 @ 17:32)    Sensitivities:      -  Ampicillin: S <=2 Predicts results to ampicillin/sulbactam, amoxacillin-clavulanate and  piperacillin-tazobactam.      -  Tetra/Doxy: R >8      -  Vancomycin: S 2      Method Type: SONIA  Organism: Staphylococcus aureus (06-25-22 @ 16:36)    Sensitivities:      -  Ampicillin/Sulbactam: S <=8/4      -  Cefazolin: S <=4      -  Clindamycin: R <=0.25 This isolate is presumed to be clindamycin resistant based on detection of inducible resistance. Clindamycin may still be effective in some patients.      -  Erythromycin: R >4      -  Gentamicin: S <=1 Should not be used as monotherapy      -  Oxacillin: S <=0.25 Oxacillin predicts susceptibility for dicloxacillin, methicillin, and nafcillin      -  Penicillin: R >8      -  Rifampin: S <=1 Should not be used as monotherapy      -  Tetra/Doxy: S <=1      -  Trimethoprim/Sulfamethoxazole: S <=0.5/9.5      -  Vancomycin: S 1      Method Type: SONIA    Culture - Urine (collected 06-16-22 @ 03:40)  Source: Clean Catch Clean Catch (Midstream)  Final Report (06-17-22 @ 07:32):    <10,000 CFU/mL Normal Urogenital Shantel    Culture - Blood (collected 06-16-22 @ 03:38)  Source: .Blood Blood-Peripheral  Final Report (06-21-22 @ 04:00):    No Growth Final    Culture - Blood (collected 06-16-22 @ 03:38)  Source: .Blood Blood-Peripheral  Final Report (06-21-22 @ 04:00):    No Growth Final    WBC Count: 17.10 K/uL (06-27-22 @ 11:40)  WBC Count: 19.17 K/uL (06-27-22 @ 06:58)  WBC Count: 14.03 K/uL (06-25-22 @ 06:05)  WBC Count: 14.26 K/uL (06-24-22 @ 08:07)  WBC Count: 15.45 K/uL (06-23-22 @ 07:20)    Creatinine, Serum: 6.30 mg/dL (06-27-22 @ 11:40)  Creatinine, Serum: 6.20 mg/dL (06-27-22 @ 06:58)  Creatinine, Serum: 3.80 mg/dL (06-25-22 @ 06:05)  Creatinine, Serum: 5.10 mg/dL (06-24-22 @ 08:07)  Creatinine, Serum: 4.05 mg/dL (06-23-22 @ 07:20)    C-Reactive Protein, Serum: 19 mg/L (06-17-22 @ 10:38)    Sedimentation Rate, Erythrocyte: 13 mm/hr (06-17-22 @ 07:52)    COVID-19 PCR: NotDetec (06-23-22 @ 11:42)  COVID-19 PCR: NotDetec (06-20-22 @ 08:14)  SARS-CoV-2: NotDetec (06-15-22 @ 22:44)    All imaging and other data have been reviewed.  < from: CT Chest No Cont (06.16.22 @ 08:16) >  IMPRESSION:  Small layering bilateral pleural effusions decreased from 4/6/2022.  7 mm groundglass nodule within left lower lobe (series 2 image 44).   Recommend follow-up chest CT in 12 months to determine stability.    Assessment and Plan:   72 yo woman with PMH of HTN, DM2, CAD, CHF, A.fib, multiple falls and ESRD on HD was admitted on 6/16, came to ED with generalized weakness.   Her hypoglycemia and hypothermia on admission could be related to sepsis/SIRS source unclear at this time, could be foot infection? Left hallux looks like  a dry gangrene with superimposed infection.   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 She had Tissue cultures from 3/30/22 grew   klebsiella oxytoca/raoutella oxytoca, E. coli, MSSA so Cefazolin was given after HD to complete the course of treatment.   ESR 13 and CRP 19  MRSA PCR negative   6/21 s/p Femoral popliteal bypass with prosthetic graft  6/23 s/p R 1st toe ray amputation   Wound culture from OR with MSSA  6/27 RRT for symptomatic bradycardia transferred to MICU, had high lactate and leukocytosis.     Recommendations:  - Blood culture x 2 NGTD  - leukocytosis could be reactive   - Vascular surgery and podiatry follow ups noted   - Follow up cultures sent from OR on 6/23 now growing MSSA  - Pathology showed OM in margines so need 6 weeks treatment from the start   - Will continue ceftriaxone 2gm based on old culture and new MSSA culture (more convenient dosing)  - Last day would be 7/26    Will follow PRN.    Geovany Long MD  Division of Infectious Diseases   Please call ID service at [phone] with any question.      35 minutes spent on total encounter assessing patient, examination, chart reivew, counseling and coordinating care by the attending physician/nurse/care manager.

## 2022-06-27 NOTE — CHART NOTE - NSCHARTNOTEFT_GEN_A_CORE
Rapid response was called at _____ for bradycardia.    Events leading up to Rapid Response:  Patient w/ hx of afib, received scheduled doses of cardizem 60mg and lopressor 100mg in the AM. Patient subsequently recorded to have a heart rate in the 30s. Patient was seen and examined at the bedside by the rapid response team. Dr. Owen and ICU PA at bedside. Patient alert and oriented x3, however endorses feelings weakness.    Rapid Response Vital Signs:    BP: 86/52  HR: 37 bpm  RR: 18  SpO2: 100% on 3L NC  Temp: 98.4F  FS: 257      Physical Exam:  Gen: well appearing, NAD  HEENT: NCAT, PEERLA b/l, EOMI b/l  Cardio: regular rate and rhythm, +s1s2, no murmurs, rubs, or gallops  Pulm: CTA b/l, no wheezes, rales or rhonchi  Abdomen: soft, nontender, nondistended, +BS x4 quadrants, no guarding  Extremities: no cyanosis or edema, +2 pedal pulses  Neuro: AAOx3, CNII-XII intact, 5/5 strength all extremities, sensation intact  Skin: warm and dry      Assessment/Plan:   71 year old female w/ pmhx of A.fib rate controlled not on AC for hx of multiple falls, T2DM, HTN, HLD, ESRD on HD, CHF, s/p CABG admitted for generalized weakness. Rapid response called for bradycardia after patient received AM lopressor and cardizem medications.     -STAT EKG   -STAT CXR   - patient to be transferred to ICU, started on dopamine drip @ 5cc/hr  -Family updated by Dr. Guthrie   -RN to call if any changes Rapid response was called at 9:56a for bradycardia.    Events leading up to Rapid Response:  Patient w/ hx of afib, received scheduled doses of cardizem 60mg and lopressor 100mg in the AM. Patient subsequently recorded to have a heart rate in the 30s. Patient was seen and examined at the bedside by the rapid response team. Dr. Owen and ICU PA at bedside. Patient alert and oriented x3, however endorses feelings weakness.    Rapid Response Vital Signs:    BP: 86/52  HR: 37 bpm  RR: 18  SpO2: 100% on 3L NC  Temp: 98.4F  FS: 257      Physical Exam:  Gen: weak-appearing, frail  HEENT: NCAT, PEERLA b/l, EOMI b/l  Cardio: bradycardic, reg rhythm, +s1s2  Pulm: CTA b/l, no wheezes, rales or rhonchi  Abdomen: soft, nontender, nondistended, +BS x4 quadrants, no guarding  Extremities: no cyanosis or edema, +2 pedal pulses, R 1st toe amputation with sutures  Neuro: AAOx3, CNII-XII intact, 5/5 strength all extremities, sensation intact  Skin: warm and dry      Assessment/Plan:   71 year old female w/ pmhx of A.fib rate controlled not on AC for hx of multiple falls, T2DM, HTN, HLD, ESRD on HD, CHF, s/p CABG admitted for generalized weakness. Rapid response called for bradycardia after patient received AM lopressor and cardizem medications.     -STAT EKG   -STAT CXR   - patient to be transferred to ICU, started on dopamine drip @ 5cc/hr  -Attempted to update family, number on file may be incorrect   -RN to call if any changes Rapid response was called at 9:56a for bradycardia.    Events leading up to Rapid Response:  Patient w/ hx of afib, received scheduled doses of cardizem 60mg and lopressor 100mg in the AM. Patient subsequently recorded to have a heart rate in the 30s. Patient was seen and examined at the bedside by the rapid response team. Dr. Owen and ICU PA at bedside. Patient alert and oriented x3, however endorses feelings weakness.    Rapid Response Vital Signs:    BP: 86/52  HR: 37 bpm  RR: 18  SpO2: 100% on 3L NC  Temp: 98.4F  FS: 257      Physical Exam:  Gen: weak-appearing, frail  HEENT: NCAT, PEERLA b/l, EOMI b/l  Cardio: bradycardic, reg rhythm, +s1s2  Pulm: CTA b/l, no wheezes, rales or rhonchi  Abdomen: soft, nontender, nondistended, +BS x4 quadrants, no guarding  Extremities: no cyanosis or edema, +2 pedal pulses, R 1st toe amputation with sutures  Neuro: AAOx3, CNII-XII intact, 5/5 strength all extremities, sensation intact  Skin: warm and dry      Assessment/Plan:   71 year old female w/ pmhx of A.fib rate controlled not on AC for hx of multiple falls, T2DM, HTN, HLD, ESRD on HD, CHF, s/p CABG admitted for generalized weakness. Rapid response called for bradycardia after patient received AM lopressor and cardizem medications.     -STAT EKG   -STAT CXR   - patient to be transferred to ICU, started on dopamine drip @ 5cc/hr  -Attempted to update family, number on file may be incorrect   -RN to call if any changes    Addendum   Upon arrival to ICU patient became obtunded and agonally breathing. Code blue was called over head, though pulse was palpated and improved with bagged oxygenation. Code blue was then all cleared. She was also noted to bradycardiac so Dopamine gtt was increased, and push of 1 amp of atropine with good response. Care as per icu.

## 2022-06-27 NOTE — PROGRESS NOTE ADULT - NS PANP OPT1 GEN_ALL_CORE
I independently performed the documented history, exam, and medical decision making.
I independently performed the documented history, exam, and medical decision making.

## 2022-06-27 NOTE — PROGRESS NOTE ADULT - ASSESSMENT
72 yo woman with PMH of HTN, DM2, CAD, CHF, PAD, A.fib, multiple falls and ESRD on HD was admitted on 6/16, came to ED with generalized weakness.   Her hypoglycemia and hypothermia on admission could be related to sepsis/SIRS. Source likely sec to Right hallux with a dry gangrene and superimposed infection.   TOMMY/PVR's noted.   Now S/P R fem-BK pop w/PTFE                 POD# 6  Now S/P Partial ray amputation of R foot       POD # 4  Doing well. Bypass patent. RLE well perfused. No evidence of infection  Rapid response called this am for symptomatic bradycardia    #PAD Chronic limb ischemia with multilevel occlusive PAD on right.  On DAPT for now.   Recommend starting Xarelto 2.5 mg po bid with Plavix on DC for graft patency  H/O fall risk, however plan for MEGHAN and monitored/assisted ambulation    #Symptomatic bradycardia  Cardiology following  BB and Ca channel blocker dc'd  SSS may require PPM    #Leukocytosis  No evidence of infection of RLE bypass wounds  Consider pan cx/CXR    #ESRD on HD  HD as per renal    #DM uncontrolled  Lantus and SS coverage as per medicine    Discussed with Dr Miller

## 2022-06-27 NOTE — PROGRESS NOTE ADULT - NS PANP COMMENT GEN_ALL_CORE FT
70 yo woman with Hx ERSD, PVD, CAD, CABG admitted 6/16 with R hallux gangrene requiring R fem-pop bypass and partial ray amputation.  Today admitted to ICU for cardiogenic shock in setting of severe junctional bradycardia, attributed to multiple AV blocker meds.  In ICU had near respiratory arrest that improved with atropine and improved hemodynamics.    mentation acceptable  dopamine for severe junctional bradycardia  s/p atropine, Ca 1g, glucagon, bicarb 50mEq following near arrest to reverse CCB effect  stable from respiratory standpoint  ERSD, HD today  R great toe osteomyelitis with MSSA, continue CTX

## 2022-06-27 NOTE — GOALS OF CARE CONVERSATION - ADVANCED CARE PLANNING - CONVERSATION DETAILS
Goals of Care (GOC)/Advance Care Planning (ACP)  Date of Discussion/evaluation: 6/27/22 1152  Purpose of Discussion: In the setting of advanced age and multiple comorbidities, discussion of patient's wished should there be any deterioration in health status.   Parties Present/Discussed with:   Patient's Decision making capacity at the time of discussion: AAOx2 (not oriented to time or circumstance) and does not have decision making capacity  Presentation: As above  GOC: Code Status, HCP, Alternative Feeding Methods, Blood product Transfusions    PLAN:  Code Status: DNR/DNI  HCP: No official HCP or advanced directive.  in Stroke rehab, Pt has step son who states cousin is HCP. Both are making decisions together without difficulty  Alternative Feeding methods: Would like to discuss or assess should the situation arise that it requires alternative feeding methods. **May be ok with temporary NGT  however if clinical course has  no meaningful improvement may change decision to no feeding tube  Blood Product Transfusions: YES Agreeable  Pressors: YES Agreeable  MOLST filled out and completed at bedside @ 1152 on 6/27/2022; Family aware that changes can be made by family at any time and moving forward. They verbalized understanding of above and agreeable to proceed as planned.     ACP/GOC Time Spent: 23 minutes Goals of Care (GOC)/Advance Care Planning (ACP)  Date of Discussion/evaluation: 6/27/22  Purpose of Discussion: In the setting of advanced age and multiple comorbidities, discussion of patient's wished should there be any deterioration in health status.   Parties Present/Discussed with: Ludy Delatorre (Cousin)/ Step son... over phone  Patient's Decision making capacity at the time of discussion: AAOx2 (not oriented to time or circumstance) and does not have decision making capacity  Presentation: As above  GOC: Code Status, HCP, Alternative Feeding Methods, Blood product Transfusions    PLAN:  Code Status: DNR/DNI  HCP: No official HCP or advanced directive.  in Stroke rehab, Pt has step son who states cousin is HCP. Cousin states that she had conversation with pt before admission and knows what her wishes would be. Both Step son and cousin are making decisions together without difficulty    Alternative Feeding methods: Would like to discuss or assess should the situation arise that it requires alternative feeding methods. **May be ok with temporary NGT  however if clinical course has  no meaningful improvement may change decision to no feeding tube    Blood Product Transfusions: YES Agreeable  Pressors: YES Agreeable  MOLST filled out and completed at bedside @ 1152 on 6/27/2022; Family aware that changes can be made by family at any time and moving forward. They verbalized understanding of above and agreeable to proceed as planned.     ACP/GOC Time Spent: 23 minutes

## 2022-06-27 NOTE — PROGRESS NOTE ADULT - SUBJECTIVE AND OBJECTIVE BOX
Patient is a 71y Female with a known history of :  SIRS (systemic inflammatory response syndrome) [R65.10]    Hypoglycemia [E16.2]    Pleural effusion [J90]    CHF, acute [I50.9]    Chronic CHF [I50.9]    Elevated troponin [R77.8]    Atrial fibrillation [I48.91]    Benign essential HTN [I10]    HLD (hyperlipidemia) [E78.5]    Depression, major [F32.9]    Need for prophylactic measure [Z29.9]    ESRD on hemodialysis [N18.6]    T2DM (type 2 diabetes mellitus) [E11.9]    HFrEF (heart failure with reduced ejection fraction) [I50.20]    Gangrene of toe of right foot [I96]    Atherosclerotic PVD with ulceration [I70.209]    PVD (peripheral vascular disease) [I73.9]      HPI:  Patient is a 71 year old female with PMH of A.fib rate controlled not on AC for hx of multiple falls, T2DM, HTN, HLD, ESRD on HD, CHF, s/p CABG brought in by EMS for generalized weakness at home. Patient states that she was doing well when in the afternoon she tried to get up from her couch and felt weak in the LE and fell back in her couch. Denies any hx of head trauma or loss of consciousness. Denies any weakness or numbness. Denies any chest pain, SOB or palpitations. No fever, cough or chills. No hx of recent travel or sick contacts. At the time of EMS arrival patient was found to have POCBS of 50. EMS gave dextrose and on arrival to the ED patient blood sugar was found to be in 30's with hypothermia of 94.9.    ED course:   Vitals:   BP: 176/85  HR:76  Temp:94.2  RR:18, O2:96% on RA   Significant Labs:   CBC: WBC:16.82 Hb:13.2 , Platlets: 260, Neutro:89   CMP: Lytes: WNL, BUN/Creatinine:48/4.8, Glucose:134 , Alkaline Phos:128, AST, 41, eGFR: 9, HsTrop: 275.9, ProBNP: 181141, Lactate: 2  UA; negative  CXR: Heart and lung appear normal (self read)  CT BRAIN: No acute intracranial bleeding. Central volume loss, chronic microvascular ischemic changes, and chronic bilateral lacunar infarctions.  CT CERVICAL SPINE: No fracture. Grade 1 C4-C5 anterolisthesis. C6-C7 disc degeneration. Bilateral pleural effusions and interlobular septal thickening due to   interstitial edema.  EKG: NSR, left axis deviation, HR 71,   QT/QTc: 426/462  Patient received: Ceftriaxone 1 g x1, Dextrose 5% + NS 1L x1, Dextrose 50% IV X1, heating blanket, 2L NC   (16 Jun 2022 01:19)      REVIEW OF SYSTEMS:    CONSTITUTIONAL: No fever, weight loss, or fatigue  EYES: No eye pain, visual disturbances, or discharge  ENMT:  No difficulty hearing, tinnitus, vertigo; No sinus or throat pain  NECK: No pain or stiffness  BREASTS: No pain, masses, or nipple discharge  RESPIRATORY: No cough, wheezing, chills or hemoptysis; No shortness of breath  CARDIOVASCULAR: No chest pain, palpitations, dizziness, or leg swelling  GASTROINTESTINAL: No abdominal or epigastric pain. No nausea, vomiting, or hematemesis; No diarrhea or constipation. No melena or hematochezia.  GENITOURINARY: No dysuria, frequency, hematuria, or incontinence  NEUROLOGICAL: No headaches, memory loss, loss of strength, numbness, or tremors  SKIN: No itching, burning, rashes, or lesions   LYMPH NODES: No enlarged glands  ENDOCRINE: No heat or cold intolerance; No hair loss  MUSCULOSKELETAL: No joint pain or swelling; No muscle, back, or extremity pain  PSYCHIATRIC: No depression, anxiety, mood swings, or difficulty sleeping  HEME/LYMPH: No easy bruising, or bleeding gums  ALLERGY AND IMMUNOLOGIC: No hives or eczema    MEDICATIONS  (STANDING):  aspirin enteric coated 81 milliGRAM(s) Oral daily  atorvastatin 40 milliGRAM(s) Oral at bedtime  calcium acetate 667 milliGRAM(s) Oral three times a day with meals  cefTRIAXone   IVPB 2000 milliGRAM(s) IV Intermittent every 24 hours  chlorhexidine 4% Liquid 1 Application(s) Topical <User Schedule>  cloNIDine 0.1 milliGRAM(s) Oral two times a day  clopidogrel Tablet 75 milliGRAM(s) Oral daily  dextrose 5%. 1000 milliLiter(s) (50 mL/Hr) IV Continuous <Continuous>  dextrose 50% Injectable 25 Gram(s) IV Push once  diltiazem    Tablet 60 milliGRAM(s) Oral every 8 hours  glucagon  Injectable 1 milliGRAM(s) IntraMuscular once  heparin   Injectable 5000 Unit(s) SubCutaneous every 12 hours  imipramine 50 milliGRAM(s) Oral daily  insulin glargine Injectable (LANTUS) 15 Unit(s) SubCutaneous at bedtime  insulin lispro (ADMELOG) corrective regimen sliding scale   SubCutaneous three times a day before meals  insulin lispro Injectable (ADMELOG) 3 Unit(s) SubCutaneous three times a day before meals  metoprolol tartrate 100 milliGRAM(s) Oral every 12 hours  pantoprazole    Tablet 40 milliGRAM(s) Oral before breakfast    MEDICATIONS  (PRN):  acetaminophen     Tablet .. 650 milliGRAM(s) Oral every 6 hours PRN Temp greater or equal to 38C (100.4F), Mild Pain (1 - 3)  aluminum hydroxide/magnesium hydroxide/simethicone Suspension 30 milliLiter(s) Oral every 4 hours PRN Dyspepsia  dextrose Oral Gel 15 Gram(s) Oral once PRN Blood Glucose LESS THAN 70 milliGRAM(s)/deciliter  diphenhydrAMINE Injectable 25 milliGRAM(s) IV Push <User Schedule> PRN Combative behavior  melatonin 3 milliGRAM(s) Oral at bedtime PRN Insomnia      ALLERGIES: latex (Unknown)  No Known Drug Allergies      FAMILY HISTORY:  No pertinent family history in first degree relatives        PHYSICAL EXAMINATION:  -----------------------------  T(C): 36.9 (06-27-22 @ 06:22), Max: 37.7 (06-26-22 @ 21:37)  HR: 85 (06-27-22 @ 06:22) (63 - 85)  BP: 150/62 (06-27-22 @ 06:22) (122/63 - 150/62)  RR: 18 (06-27-22 @ 06:22) (18 - 18)  SpO2: 95% (06-27-22 @ 06:22) (91% - 95%)  Wt(kg): --        VITALS  T(C): 36.9 (06-27-22 @ 06:22), Max: 37.7 (06-26-22 @ 21:37)  HR: 85 (06-27-22 @ 06:22) (63 - 85)  BP: 150/62 (06-27-22 @ 06:22) (122/63 - 150/62)  RR: 18 (06-27-22 @ 06:22) (18 - 18)  SpO2: 95% (06-27-22 @ 06:22) (91% - 95%)    Constitutional: well developed, normal appearance, well groomed, well nourished, no deformities and no acute distress.   Eyes: the conjunctiva exhibited no abnormalities and the eyelids demonstrated no xanthelasmas.   HEENT: normal oral mucosa, no oral pallor and no oral cyanosis.   Neck: normal jugular venous A waves present, normal jugular venous V waves present and no jugular venous wilson A waves.   Pulmonary: no respiratory distress, normal respiratory rhythm and effort, no accessory muscle use and lungs were clear to auscultation bilaterally.   Cardiovascular: heart rate and rhythm were normal, normal S1 and S2 and no murmur, gallop, rub, heave or thrill are present.   Abdomen: soft, non-tender, no hepato-splenomegaly and no abdominal mass palpated.   Musculoskeletal: the gait could not be assessed..   Extremities: no clubbing of the fingernails, no localized cyanosis, no petechial hemorrhages and no ischemic changes.   Skin: normal skin color and pigmentation, no rash, no venous stasis, no skin lesions, no skin ulcer and no xanthoma was observed.   Psychiatric: oriented to person, place, and time, the affect was normal, the mood was normal and not feeling anxious.     LABS:   --------  06-27    131<L>  |  92<L>  |  71<H>  ----------------------------<  279<H>  4.8   |  28  |  6.20<H>    Ca    10.2<H>      27 Jun 2022 06:58  Phos  2.0     06-27  Mg     2.7     06-27    TPro  6.7  /  Alb  2.4<L>  /  TBili  0.4  /  DBili  x   /  AST  27  /  ALT  7<L>  /  AlkPhos  107  06-27                         9.2    19.17 )-----------( 300      ( 27 Jun 2022 06:58 )             29.5                 RADIOLOGY:  -----------------    ECG:     ECHO:

## 2022-06-27 NOTE — PHARMACOTHERAPY INTERVENTION NOTE - COMMENTS
Patient is a 71y F ordered for ceftriaxone 2g IV q24h for SSTI and OM s/p R 1st toe amputation. Wound culture with growth of MSSA and E faecalis. Discussed therapy modification with ID Dr. Long since Enterococcus sp are intrinsically resistant to cephalosporins. Based on sensitives, MD agreed to switch to Unasyn 3g IV q24h (hemodialysis dosing). Entered order per discussion.

## 2022-06-27 NOTE — PROGRESS NOTE ADULT - SUBJECTIVE AND OBJECTIVE BOX
71y year old Female seen at MICU. Pt transferred due to RRT with Bradycardia. Pt  S/p Right hallux amputation (DOS: 6/23/22). Pt doing well. Pt verbal but lethargic. Pt being monitored in MICU.   Allergies    latex (Unknown)  No Known Drug Allergies    Intolerances    MEDICATIONS  (STANDING):  aspirin enteric coated 81 milliGRAM(s) Oral daily  atorvastatin 40 milliGRAM(s) Oral at bedtime  calcium acetate 667 milliGRAM(s) Oral three times a day with meals  cefTRIAXone   IVPB 2000 milliGRAM(s) IV Intermittent every 24 hours  chlorhexidine 4% Liquid 1 Application(s) Topical <User Schedule>  cloNIDine 0.1 milliGRAM(s) Oral two times a day  clopidogrel Tablet 75 milliGRAM(s) Oral daily  dextrose 5%. 1000 milliLiter(s) (50 mL/Hr) IV Continuous <Continuous>  dextrose 50% Injectable 25 Gram(s) IV Push once  DOPamine Infusion 5 MICROgram(s)/kG/Min (10.1 mL/Hr) IV Continuous <Continuous>  epoetin fawad-epbx (RETACRIT) Injectable 72373 Unit(s) IV Push <User Schedule>  glucagon  Injectable 1 milliGRAM(s) IntraMuscular once  heparin   Injectable 5000 Unit(s) SubCutaneous every 12 hours  imipramine 50 milliGRAM(s) Oral daily  insulin glargine Injectable (LANTUS) 15 Unit(s) SubCutaneous at bedtime  insulin lispro (ADMELOG) corrective regimen sliding scale   SubCutaneous three times a day before meals  insulin lispro Injectable (ADMELOG) 3 Unit(s) SubCutaneous three times a day before meals  pantoprazole    Tablet 40 milliGRAM(s) Oral before breakfast    MEDICATIONS  (PRN):  acetaminophen     Tablet .. 650 milliGRAM(s) Oral every 6 hours PRN Temp greater or equal to 38C (100.4F), Mild Pain (1 - 3)  aluminum hydroxide/magnesium hydroxide/simethicone Suspension 30 milliLiter(s) Oral every 4 hours PRN Dyspepsia  dextrose Oral Gel 15 Gram(s) Oral once PRN Blood Glucose LESS THAN 70 milliGRAM(s)/deciliter  diphenhydrAMINE Injectable 25 milliGRAM(s) IV Push <User Schedule> PRN Combative behavior  melatonin 3 milliGRAM(s) Oral at bedtime PRN Insomnia    ICU Vital Signs Last 24 Hrs  T(C): 36.1 (27 Jun 2022 12:00), Max: 37.7 (26 Jun 2022 21:37)  T(F): 97 (27 Jun 2022 12:00), Max: 99.9 (26 Jun 2022 21:37)  HR: 68 (27 Jun 2022 16:00) (39 - 85)  BP: 143/92 (27 Jun 2022 16:00) (81/40 - 150/62)  BP(mean): 105 (27 Jun 2022 16:00) (57 - 105)  ABP: --  ABP(mean): --  RR: 33 (27 Jun 2022 16:00) (12 - 33)  SpO2: 91% (27 Jun 2022 16:00) (89% - 98%)        PHYSICAL EXAM:  Vascular: Right DP & PT dopplerable.  Capillary refill (CFT) 3 seconds. Digital hair absent bilaterally.   Neurological: Light touch sensation diminished bilaterally.  Musculoskeletal: s/p right hallux amputation. strength in all quadrants bilaterally, AJ & STJ ROM absent b/l.   Dermatological: sutures to right hallux intact. No signs of wound dehiscence. No signs of infection.       Culture - Tissue with Gram Stain (collected 23 Jun 2022 15:35)  Source: .Tissue Other, right hallux bone culture  Gram Stain (24 Jun 2022 04:37):    Rare polymorphonuclear leukocytes seen per low power field    Few Gram positive cocci in pairs seen per oil power field        ACCESSION No:  [id number]  Patient:   SHILO KOHLER      Accession:                             [id number]    Collected Date/Time:                   6/23/2022 15:35 EDT  Received Date/Time:                    6/24/2022 07:44 EDT    Surgical Pathology Report - Auth (Verified)    Specimen(s) Submitted  1  Right hallux pathology  2  Right first metatarsal/clean margin pathology    Final Diagnosis  1. Right hallux  -Acute and chronic osteomyelitis.  -Gangrenous skin and soft tissue.    2. Right first metatarsal/clean margin:  -Minimal chronic osteomyelitis.    Verified by: Randall Rodriguez MD  (Electronic Signature)  Reported on: 06/27/22 12:18 EDT, [facility], [address] [place] [address],  [place] [address]

## 2022-06-27 NOTE — PROGRESS NOTE ADULT - ASSESSMENT
71 year old female w/ pmhx of A.fib rate controlled not on AC for hx of multiple falls, T2DM, HTN, HLD, ESRD on HD, CHF, CAD s/p CABG status post R fem-BK pop w/PTFE on June 21st  S/P Partial ray amputation of R foot on June 23 now in MICU s/p RRT for bradycardia in setting of AVN blocker use.    1. Cardiogenic Shock   2. Bradycardia, Junctional Rhythm/AV quentin disassociation  3. ESRD HD  4. R fem-BK pop w/PTFE pod 6                   5. Gangrene of R hallux toe s/p  amputation of toe pod 4  6. HFrEF  7. AFIB  8. DM2      Neuro:  - Avoid Neuro Deliriogenic / sedative medications    CV:  - Dopamine gtt titrate per MICU team  - STAT EKG showing Junctional Rhythm/ AV quentin disassociation  - Hold all Quentin Blockers/antihypertensives  - ECHO 4/22 EF 45% with moderate MR  - Continue DAPT  - Cardiology following    Pulm:  - Supplemental oxygen as needed to maintain spo2 > 94%  - f/up CXR                  GI:  - Continue current diet    Renal:  - Even to net negative fluid balance as tolerated by hemodynamics and renal fx.    - Cr 3.2 Continue to monitor Bun/Cr  - Replacing electrolytes as needed with Goal K> 4, PO> 3, Mg> 2               - Strict I&O's  - Avoid Nephro toxic medication  - Renally dose meds  - HD TTS Schedule    Heme:  - Heparin for DVT prophylaxis    ID:  - WBC 19.17  - Ceftriaxone  - trend CBC with diff, CMP  and fever curve  - Hx of L medial malleolus growing klebsiella oxytoca/raoutella oxytoca, E. coli, MSSA. Recent blood cultures negative.  - Bone culture growing MSSA  - consider repeat cultures and broadening of abx in d/w ID    Endo:  - ISS for aggressive glycemic control to limit FS glucose to < 180mg/dl.    GOC  - DNR DNI per MICU team d/w family      All care and consultations per MICU team.  Discussed with Dr Coppola

## 2022-06-27 NOTE — PROGRESS NOTE ADULT - SUBJECTIVE AND OBJECTIVE BOX
Patient is a 71y old  Female who presents with a chief complaint of SIRS (27 Jun 2022 08:43)  Vascular surgery consulted for PAD with gangrene  S/P R fem-BK pop w/PTFE                     POD#  6  S/P Partial ray amputation of R foot       POD # 4  Pt with c/o SOB and rapid response called  Noted with bradycardia      Allergies  latex (Unknown)  No Known Drug Allergies      Vital Signs Last 24 Hrs  T(C): 36.9 (27 Jun 2022 06:22), Max: 37.7 (26 Jun 2022 21:37)  T(F): 98.4 (27 Jun 2022 06:22), Max: 99.9 (26 Jun 2022 21:37)  HR: 85 (27 Jun 2022 06:22) (63 - 85)  BP: 150/62 (27 Jun 2022 06:22) (122/63 - 150/62)  BP(mean): --  RR: 18 (27 Jun 2022 06:22) (18 - 18)  SpO2: 95% (27 Jun 2022 06:22) (91% - 95%)  I&O's Detail      Physical Exam:  General: Pale and lethargic but arousable  Pulmonary: Nonlabored breathing, no respiratory distress, diminished BS R base  Cardiovascular: irreg S1, S2  Abdominal: soft, NT/ND  Extremities: RLE WWP with ecchymotic R groin ; Suture line without erythema or drainage. R 1st ray amputation site with mild periwound erythema. No drainage  Pulses:   Right:                                                                          DP [ x]2+ [ ]1+ [ ]doppler  PT[ ]2+ [ ]1+ [x ]doppler                                                         LABS:                        9.2    19.17 )-----------( 300      ( 27 Jun 2022 06:58 )             29.5     06-27    131<L>  |  92<L>  |  71<H>  ----------------------------<  279<H>  4.8   |  28  |  6.20<H>    Ca    10.2<H>      27 Jun 2022 06:58  Phos  2.0     06-27  Mg     2.7     06-27    TPro  6.7  /  Alb  2.4<L>  /  TBili  0.4  /  DBili  x   /  AST  27  /  ALT  7<L>  /  AlkPhos  107  06-27      CAPILLARY BLOOD GLUCOSE  POCT Blood Glucose.: 257 mg/dL (27 Jun 2022 09:57)  POCT Blood Glucose.: 283 mg/dL (27 Jun 2022 07:27)  POCT Blood Glucose.: 319 mg/dL (26 Jun 2022 21:02)  POCT Blood Glucose.: 359 mg/dL (26 Jun 2022 16:51)  POCT Blood Glucose.: 300 mg/dL (26 Jun 2022 11:29)    Radiology and Additional Studies:

## 2022-06-28 LAB
ALBUMIN SERPL ELPH-MCNC: 2.2 G/DL — LOW (ref 3.3–5)
ALP SERPL-CCNC: 101 U/L — SIGNIFICANT CHANGE UP (ref 40–120)
ALT FLD-CCNC: 8 U/L — LOW (ref 12–78)
ANION GAP SERPL CALC-SCNC: 10 MMOL/L — SIGNIFICANT CHANGE UP (ref 5–17)
AST SERPL-CCNC: 30 U/L — SIGNIFICANT CHANGE UP (ref 15–37)
BILIRUB SERPL-MCNC: 0.4 MG/DL — SIGNIFICANT CHANGE UP (ref 0.2–1.2)
BUN SERPL-MCNC: 31 MG/DL — HIGH (ref 7–23)
CALCIUM SERPL-MCNC: 9.7 MG/DL — SIGNIFICANT CHANGE UP (ref 8.5–10.1)
CHLORIDE SERPL-SCNC: 95 MMOL/L — LOW (ref 96–108)
CO2 SERPL-SCNC: 30 MMOL/L — SIGNIFICANT CHANGE UP (ref 22–31)
CREAT SERPL-MCNC: 3.4 MG/DL — HIGH (ref 0.5–1.3)
CULTURE RESULTS: SIGNIFICANT CHANGE UP
EGFR: 14 ML/MIN/1.73M2 — LOW
GLUCOSE SERPL-MCNC: 47 MG/DL — CRITICAL LOW (ref 70–99)
HCT VFR BLD CALC: 26.2 % — LOW (ref 34.5–45)
HGB BLD-MCNC: 8.2 G/DL — LOW (ref 11.5–15.5)
MCHC RBC-ENTMCNC: 28 PG — SIGNIFICANT CHANGE UP (ref 27–34)
MCHC RBC-ENTMCNC: 31.3 GM/DL — LOW (ref 32–36)
MCV RBC AUTO: 89.4 FL — SIGNIFICANT CHANGE UP (ref 80–100)
NRBC # BLD: 0 /100 WBCS — SIGNIFICANT CHANGE UP (ref 0–0)
ORGANISM # SPEC MICROSCOPIC CNT: SIGNIFICANT CHANGE UP
PLATELET # BLD AUTO: 308 K/UL — SIGNIFICANT CHANGE UP (ref 150–400)
POTASSIUM SERPL-MCNC: 3.4 MMOL/L — LOW (ref 3.5–5.3)
POTASSIUM SERPL-SCNC: 3.4 MMOL/L — LOW (ref 3.5–5.3)
PROT SERPL-MCNC: 6.1 G/DL — SIGNIFICANT CHANGE UP (ref 6–8.3)
RBC # BLD: 2.93 M/UL — LOW (ref 3.8–5.2)
RBC # FLD: 17.3 % — HIGH (ref 10.3–14.5)
SARS-COV-2 RNA SPEC QL NAA+PROBE: SIGNIFICANT CHANGE UP
SODIUM SERPL-SCNC: 135 MMOL/L — SIGNIFICANT CHANGE UP (ref 135–145)
SPECIMEN SOURCE: SIGNIFICANT CHANGE UP
WBC # BLD: 16.04 K/UL — HIGH (ref 3.8–10.5)
WBC # FLD AUTO: 16.04 K/UL — HIGH (ref 3.8–10.5)

## 2022-06-28 PROCEDURE — 99233 SBSQ HOSP IP/OBS HIGH 50: CPT

## 2022-06-28 PROCEDURE — 99233 SBSQ HOSP IP/OBS HIGH 50: CPT | Mod: GC

## 2022-06-28 PROCEDURE — 71045 X-RAY EXAM CHEST 1 VIEW: CPT | Mod: 26

## 2022-06-28 PROCEDURE — 99024 POSTOP FOLLOW-UP VISIT: CPT

## 2022-06-28 PROCEDURE — 99232 SBSQ HOSP IP/OBS MODERATE 35: CPT

## 2022-06-28 RX ORDER — METOPROLOL TARTRATE 50 MG
50 TABLET ORAL ONCE
Refills: 0 | Status: COMPLETED | OUTPATIENT
Start: 2022-06-28 | End: 2022-06-28

## 2022-06-28 RX ORDER — INSULIN GLARGINE 100 [IU]/ML
7 INJECTION, SOLUTION SUBCUTANEOUS AT BEDTIME
Refills: 0 | Status: DISCONTINUED | OUTPATIENT
Start: 2022-06-28 | End: 2022-06-30

## 2022-06-28 RX ORDER — DEXTROSE 50 % IN WATER 50 %
25 SYRINGE (ML) INTRAVENOUS ONCE
Refills: 0 | Status: COMPLETED | OUTPATIENT
Start: 2022-06-28 | End: 2022-06-28

## 2022-06-28 RX ORDER — ZINC SULFATE TAB 220 MG (50 MG ZINC EQUIVALENT) 220 (50 ZN) MG
220 TAB ORAL DAILY
Refills: 0 | Status: DISCONTINUED | OUTPATIENT
Start: 2022-06-28 | End: 2022-07-07

## 2022-06-28 RX ORDER — METOPROLOL TARTRATE 50 MG
50 TABLET ORAL
Refills: 0 | Status: DISCONTINUED | OUTPATIENT
Start: 2022-06-28 | End: 2022-06-29

## 2022-06-28 RX ADMIN — HEPARIN SODIUM 5000 UNIT(S): 5000 INJECTION INTRAVENOUS; SUBCUTANEOUS at 06:08

## 2022-06-28 RX ADMIN — Medication 650 MILLIGRAM(S): at 00:23

## 2022-06-28 RX ADMIN — PANTOPRAZOLE SODIUM 40 MILLIGRAM(S): 20 TABLET, DELAYED RELEASE ORAL at 09:09

## 2022-06-28 RX ADMIN — Medication 50 MILLIGRAM(S): at 11:26

## 2022-06-28 RX ADMIN — Medication 81 MILLIGRAM(S): at 11:26

## 2022-06-28 RX ADMIN — Medication 50 MILLIGRAM(S): at 11:27

## 2022-06-28 RX ADMIN — Medication 650 MILLIGRAM(S): at 22:39

## 2022-06-28 RX ADMIN — CHLORHEXIDINE GLUCONATE 1 APPLICATION(S): 213 SOLUTION TOPICAL at 07:46

## 2022-06-28 RX ADMIN — Medication 2: at 17:11

## 2022-06-28 RX ADMIN — Medication 650 MILLIGRAM(S): at 01:05

## 2022-06-28 RX ADMIN — INSULIN GLARGINE 7 UNIT(S): 100 INJECTION, SOLUTION SUBCUTANEOUS at 22:25

## 2022-06-28 RX ADMIN — HEPARIN SODIUM 5000 UNIT(S): 5000 INJECTION INTRAVENOUS; SUBCUTANEOUS at 17:10

## 2022-06-28 RX ADMIN — ERYTHROPOIETIN 10000 UNIT(S): 10000 INJECTION, SOLUTION INTRAVENOUS; SUBCUTANEOUS at 23:26

## 2022-06-28 RX ADMIN — CLOPIDOGREL BISULFATE 75 MILLIGRAM(S): 75 TABLET, FILM COATED ORAL at 11:26

## 2022-06-28 RX ADMIN — Medication 0.1 MILLIGRAM(S): at 17:10

## 2022-06-28 RX ADMIN — Medication 0.1 MILLIGRAM(S): at 06:08

## 2022-06-28 RX ADMIN — Medication 25 GRAM(S): at 09:09

## 2022-06-28 RX ADMIN — Medication 3 MILLIGRAM(S): at 00:23

## 2022-06-28 RX ADMIN — ATORVASTATIN CALCIUM 40 MILLIGRAM(S): 80 TABLET, FILM COATED ORAL at 22:25

## 2022-06-28 RX ADMIN — Medication 50 MILLIGRAM(S): at 17:10

## 2022-06-28 RX ADMIN — Medication 667 MILLIGRAM(S): at 09:25

## 2022-06-28 NOTE — PROGRESS NOTE ADULT - ASSESSMENT
71 year old female w/ pmhx of A.fib rate controlled not on AC for hx of multiple falls, T2DM, HTN, HLD, ESRD on HD, CHF, s/p CABG admitted to ICU for Bradycardia, Junctional Rhythm/AV willian disassociation now resolved stable for floors.     Neuro:  - Continue home Imipramine for Depression   - Avoid Neuro Deliriogenic / sedative medications    CV:  - S/P Rapid response 6/27 for Bradycardia in the 30s and STAT EKG with Junctional Rhythm/AV willian dissoaciation, S/P dopamine gtt, IVP of atropine x1, Bicarb IVP x1, glucagon IVP x1, and Calcium IVP x1, HRs now improved   - Restart on Lopressor as 50mg BID (from 100mg BID) and continue to hold Cardizem   - Avoiding fluid overload  - ECHO 4/22 EF 45% with moderate MR  - Continue DAPT insetting of stent   - Cardiology following    Pulm:  - Satting well on RA   - Supplemental oxygen as needed to maintain spo2 > 94%  - STAT CXR with interstitial pulmonary edema and small b/l pleural effusion                    GI:  - Continue current diet  - Nutrition following     Renal:  - Cr 3.40 Continue to monitor Bun/Cr  - Replacing electrolytes as needed with Goal K> 4, PO> 3, Mg> 2               - Strict I&O's  - Avoid Nephro toxic medication  - Renally dose meds  - S/P Dialysis yesterday     Heme:  - Heparin for DVT prophylaxis    ID:  - WBC 16.10,  remains afebrile with no antipyretics administered   - OR cultures grew MSSA along with tissue culture of Staph aureus, Enterococcus facalis   - So abx was switched   - Microbiology and Radiology reviewed   - trend CBC with diff, CMP  and fever curve  - Hx of L medial malleolus growing klebsiella oxytoca/raoutella oxytoca, E. coli, MSSA. Recent blood cultures negative.  - Bone culture growing MSSA    Endo:  - ISS for aggressive glycemic control to limit FS glucose to < 180mg/dl.      Critical Care Time (EXCLUSIVE of any non bundled procedures) :  38 minutes were spent assessing the patient's presenting problems of acute illness that pose a high probability of life threatening  deterioration or end organ damage / dysfunction.  Medical desicion making includes initiation / continuation of plan or care review data/ labwork/ radiographic study, direct patient care bedside ,  discussions with  consultants regarding care,  evaluation and interpretation of vital signs, any necessary ventilator management,   NIV or BIPAP changes  or initiation,    discussions with multidisipliary team,  am or pm rounds, discussions of goals of care with patient and family all non-inclusive of procedures.    Plan discussed with Dr Coppola 71 year old female w/ pmhx of A.fib rate controlled not on AC for hx of multiple falls, T2DM, HTN, HLD, ESRD on HD, CHF, s/p CABG admitted to ICU for Bradycardia, Junctional Rhythm/AV willian disassociation now resolved stable for floors.     Neuro:  - Continue home Imipramine for Depression   - Avoid Neuro Deliriogenic / sedative medications    CV:  - S/P Rapid response 6/27 for Bradycardia in the 30s and STAT EKG with Junctional Rhythm/AV willian dissoaciation, S/P dopamine gtt, IVP of atropine x1, Bicarb IVP x1, glucagon IVP x1, and Calcium IVP x1, HRs now improved   - Restart on Lopressor as 50mg BID (from 100mg BID) and continue to hold Cardizem   - Avoiding fluid overload  - ECHO 4/22 EF 45% with moderate MR  - Continue DAPT insetting of stent   - Cardiology following    Pulm:  - Satting well on RA   - Supplemental oxygen as needed to maintain spo2 > 94%  - STAT CXR with interstitial pulmonary edema and small b/l pleural effusion                    GI:  - Continue current diet  - Nutrition following     Renal:  - Cr 3.40 Continue to monitor Bun/Cr  - Replacing electrolytes as needed with Goal K> 4, PO> 3, Mg> 2               - Strict I&O's  - Avoid Nephro toxic medication  - Renally dose meds  - S/P Dialysis yesterday     Heme:  - Heparin for DVT prophylaxis    ID:  - WBC 16.10,  remains afebrile with no antipyretics administered   - OR cultures grew MSSA along with tissue culture of Staph aureus, Enterococcus facalis   - So abx was switched Cetriaxone to Unasyn   - trend CBC with diff, CMP  and fever curve  - Hx of L medial malleolus growing klebsiella oxytoca/raoutella oxytoca, E. coli, MSSA.     Endo:  - S/P Dextrose 50 IVP  x1 for low glucose   - Decreased Lantus from 15 unit QHS to 7 units QHS and D/C 3units pre-meal   - Continue on ISS   - Monitor glycemic control to limit FS glucose to < 180mg/dl.

## 2022-06-28 NOTE — PROGRESS NOTE ADULT - SUBJECTIVE AND OBJECTIVE BOX
CAPILLARY BLOOD GLUCOSE      POCT Blood Glucose.: 112 mg/dL (27 Jun 2022 22:01)  POCT Blood Glucose.: 212 mg/dL (27 Jun 2022 17:11)  POCT Blood Glucose.: 254 mg/dL (27 Jun 2022 11:46)  POCT Blood Glucose.: 257 mg/dL (27 Jun 2022 09:57)      Vital Signs Last 24 Hrs  T(C): 36.5 (28 Jun 2022 07:53), Max: 36.5 (28 Jun 2022 07:53)  T(F): 97.7 (28 Jun 2022 07:53), Max: 97.7 (28 Jun 2022 07:53)  HR: 102 (28 Jun 2022 07:00) (39 - 102)  BP: 108/55 (28 Jun 2022 07:00) (81/40 - 178/98)  BP(mean): 79 (28 Jun 2022 07:00) (57 - 119)  RR: 15 (28 Jun 2022 07:00) (12 - 38)  SpO2: 93% (28 Jun 2022 07:00) (89% - 99%)    Respiratory: CTA B/L  CV: RRR, S1S2, no murmurs, rubs or gallops  Abdominal: Soft, NT, ND +BS, Last BM  Extremities: No edema, + peripheral pulses     06-27    129<L>  |  92<L>  |  73<H>  ----------------------------<  264<H>  4.5   |  27  |  6.30<H>    Ca    12.3<H>      27 Jun 2022 11:40  Phos  2.2     06-27  Mg     2.9     06-27    TPro  6.5  /  Alb  2.3<L>  /  TBili  0.6  /  DBili  x   /  AST  32  /  ALT  10<L>  /  AlkPhos  105  06-27      atorvastatin 40 milliGRAM(s) Oral at bedtime  dextrose 50% Injectable 25 Gram(s) IV Push once  dextrose Oral Gel 15 Gram(s) Oral once PRN  glucagon  Injectable 1 milliGRAM(s) IntraMuscular once  insulin glargine Injectable (LANTUS) 15 Unit(s) SubCutaneous at bedtime  insulin lispro (ADMELOG) corrective regimen sliding scale   SubCutaneous three times a day before meals  insulin lispro Injectable (ADMELOG) 3 Unit(s) SubCutaneous three times a day before meals

## 2022-06-28 NOTE — CHART NOTE - NSCHARTNOTEFT_GEN_A_CORE
Nutr.re-assessment/follow up:     Factors impacting intake: [ ] none [ ] nausea  [ ] vomiting [ ] diarrhea [ ] constipation  [ ]chewing problems [ ] swallowing issues  [ ] other:     Diet Presciption: Diet, Consistent Carbohydrate Renal w/Evening Snack:   Supplement Feeding Modality:  Oral  Nepro Cans or Servings Per Day:  1       Frequency:  Two Times a day (06-23-22 @ 16:38)    Intake:     Current Weight: Weight (kg): 55.3 (06-27 @ 10:23)  % Weight Change    Pertinent Medications: MEDICATIONS  (STANDING):  ampicillin/sulbactam  IVPB 3 Gram(s) IV Intermittent every 24 hours  ampicillin/sulbactam  IVPB      aspirin enteric coated 81 milliGRAM(s) Oral daily  atorvastatin 40 milliGRAM(s) Oral at bedtime  calcium acetate 667 milliGRAM(s) Oral three times a day with meals  chlorhexidine 4% Liquid 1 Application(s) Topical <User Schedule>  cloNIDine 0.1 milliGRAM(s) Oral two times a day  clopidogrel Tablet 75 milliGRAM(s) Oral daily  dextrose 5%. 1000 milliLiter(s) (50 mL/Hr) IV Continuous <Continuous>  dextrose 50% Injectable 25 Gram(s) IV Push once  DOPamine Infusion 5 MICROgram(s)/kG/Min (10.1 mL/Hr) IV Continuous <Continuous>  epoetin fawad-epbx (RETACRIT) Injectable 37488 Unit(s) IV Push <User Schedule>  glucagon  Injectable 1 milliGRAM(s) IntraMuscular once  heparin   Injectable 5000 Unit(s) SubCutaneous every 12 hours  imipramine 50 milliGRAM(s) Oral daily  insulin glargine Injectable (LANTUS) 15 Unit(s) SubCutaneous at bedtime  insulin lispro (ADMELOG) corrective regimen sliding scale   SubCutaneous three times a day before meals  insulin lispro Injectable (ADMELOG) 3 Unit(s) SubCutaneous three times a day before meals  pantoprazole    Tablet 40 milliGRAM(s) Oral before breakfast    MEDICATIONS  (PRN):  acetaminophen     Tablet .. 650 milliGRAM(s) Oral every 6 hours PRN Temp greater or equal to 38C (100.4F), Mild Pain (1 - 3)  aluminum hydroxide/magnesium hydroxide/simethicone Suspension 30 milliLiter(s) Oral every 4 hours PRN Dyspepsia  dextrose Oral Gel 15 Gram(s) Oral once PRN Blood Glucose LESS THAN 70 milliGRAM(s)/deciliter  diphenhydrAMINE Injectable 25 milliGRAM(s) IV Push <User Schedule> PRN Combative behavior  melatonin 3 milliGRAM(s) Oral at bedtime PRN Insomnia    Pertinent Labs: 06-27 Na129 mmol/L<L> Glu 264 mg/dL<H> K+ 4.5 mmol/L Cr  6.30 mg/dL<H> BUN 73 mg/dL<H> 06-27 Phos 2.2 mg/dL<L> 06-27 Alb 2.3 g/dL<L>     CAPILLARY BLOOD GLUCOSE      POCT Blood Glucose.: 112 mg/dL (27 Jun 2022 22:01)  POCT Blood Glucose.: 212 mg/dL (27 Jun 2022 17:11)  POCT Blood Glucose.: 254 mg/dL (27 Jun 2022 11:46)  POCT Blood Glucose.: 257 mg/dL (27 Jun 2022 09:57)    Skin:     Estimated Needs:   [ ] no change since previous assessment  [ ] recalculated:     Previous Nutrition Diagnosis:   [ ] Inadequate Energy Intake [ ]Inadequate Oral Intake [ ] Excessive Energy Intake   [ ] Underweight [ ] Increased Nutrient Needs [ ] Overweight/Obesity   [ ] Altered GI Function [ ] Unintended Weight Loss [ ] Food & Nutrition Related Knowledge Deficit [ ] Malnutrition     Nutrition Diagnosis is [ ] ongoing  [ ] resolved [ ] not applicable     New Nutrition Diagnosis: [ ] not applicable       Interventions:   Recommend  [ ] Change Diet To:  [ ] Nutrition Supplement  [ ] Nutrition Support  [ ] Other:     Monitoring and Evaluation:   [ ] PO intake [ x ] Tolerance to diet prescription [ x ] weights [ x ] labs[ x ] follow up per protocol  [ ] other: Nutr.re-assessment/follow up: 71 year old female w/ pmhx of A.fib rate controlled not on AC for hx of multiple falls, T2DM, HTN, HLD, ESRD on HD, CHF, CAD s/p CABG status post R fem-BK pop w/PTFE on June 21st S/P Partial ray amputation of R foot on June 23 now in MICU s/p RRT for bradycardia in setting of AVN blocker use. Being treated for present issues:. Cardiogenic Shock  Bradycardia, nctional Rhythm/AV willian disassociation as well as above ongoing issues    1. Cardiogenic Shock   2. Bradycardia, Junctional Rhythm/AV willian disassociation  3. ESRD HD  4. R fem-BK pop w/PTFE pod 6                   5. Gangrene of R hallux toe s/p  amputation of toe pod 4  6. HFrEF  7. AFIB  8. DM2      Factors impacting intake: [ ] none [ ] nausea  [ ] vomiting [ ] diarrhea [ ] constipation  [ ]chewing problems [ ] swallowing issues  [ ] other:     Diet Presciption: Diet, Consistent Carbohydrate Renal w/Evening Snack:   Supplement Feeding Modality:  Oral  Nepro Cans or Servings Per Day:  1       Frequency:  Two Times a day (06-23-22 @ 16:38)    Intake:     Current Weight: Weight (kg): 55.3 (06-27 @ 10:23)  % Weight Change    Pertinent Medications: MEDICATIONS  (STANDING):  ampicillin/sulbactam  IVPB 3 Gram(s) IV Intermittent every 24 hours  ampicillin/sulbactam  IVPB      aspirin enteric coated 81 milliGRAM(s) Oral daily  atorvastatin 40 milliGRAM(s) Oral at bedtime  calcium acetate 667 milliGRAM(s) Oral three times a day with meals  chlorhexidine 4% Liquid 1 Application(s) Topical <User Schedule>  cloNIDine 0.1 milliGRAM(s) Oral two times a day  clopidogrel Tablet 75 milliGRAM(s) Oral daily  dextrose 5%. 1000 milliLiter(s) (50 mL/Hr) IV Continuous <Continuous>  dextrose 50% Injectable 25 Gram(s) IV Push once  DOPamine Infusion 5 MICROgram(s)/kG/Min (10.1 mL/Hr) IV Continuous <Continuous>  epoetin fawad-epbx (RETACRIT) Injectable 57519 Unit(s) IV Push <User Schedule>  glucagon  Injectable 1 milliGRAM(s) IntraMuscular once  heparin   Injectable 5000 Unit(s) SubCutaneous every 12 hours  imipramine 50 milliGRAM(s) Oral daily  insulin glargine Injectable (LANTUS) 15 Unit(s) SubCutaneous at bedtime  insulin lispro (ADMELOG) corrective regimen sliding scale   SubCutaneous three times a day before meals  insulin lispro Injectable (ADMELOG) 3 Unit(s) SubCutaneous three times a day before meals  pantoprazole    Tablet 40 milliGRAM(s) Oral before breakfast    MEDICATIONS  (PRN):  acetaminophen     Tablet .. 650 milliGRAM(s) Oral every 6 hours PRN Temp greater or equal to 38C (100.4F), Mild Pain (1 - 3)  aluminum hydroxide/magnesium hydroxide/simethicone Suspension 30 milliLiter(s) Oral every 4 hours PRN Dyspepsia  dextrose Oral Gel 15 Gram(s) Oral once PRN Blood Glucose LESS THAN 70 milliGRAM(s)/deciliter  diphenhydrAMINE Injectable 25 milliGRAM(s) IV Push <User Schedule> PRN Combative behavior  melatonin 3 milliGRAM(s) Oral at bedtime PRN Insomnia    Pertinent Labs: 06-27 Na129 mmol/L<L> Glu 264 mg/dL<H> K+ 4.5 mmol/L Cr  6.30 mg/dL<H> BUN 73 mg/dL<H> 06-27 Phos 2.2 mg/dL<L> 06-27 Alb 2.3 g/dL<L>     CAPILLARY BLOOD GLUCOSE      POCT Blood Glucose.: 112 mg/dL (27 Jun 2022 22:01)  POCT Blood Glucose.: 212 mg/dL (27 Jun 2022 17:11)  POCT Blood Glucose.: 254 mg/dL (27 Jun 2022 11:46)  POCT Blood Glucose.: 257 mg/dL (27 Jun 2022 09:57)    Skin:     Estimated Needs:   [ ] no change since previous assessment  [ ] recalculated:     Previous Nutrition Diagnosis:   [ ] Inadequate Energy Intake [ ]Inadequate Oral Intake [ ] Excessive Energy Intake   [ ] Underweight [ ] Increased Nutrient Needs [ ] Overweight/Obesity   [ ] Altered GI Function [ ] Unintended Weight Loss [ ] Food & Nutrition Related Knowledge Deficit [ ] Malnutrition     Nutrition Diagnosis is [ ] ongoing  [ ] resolved [ ] not applicable     New Nutrition Diagnosis: [ ] not applicable       Interventions:   Recommend  [ ] Change Diet To:  [ ] Nutrition Supplement  [ ] Nutrition Support  [ ] Other:     Monitoring and Evaluation:   [ ] PO intake [ x ] Tolerance to diet prescription [ x ] weights [ x ] labs[ x ] follow up per protocol  [ ] other: Nutr.re-assessment/follow up: 71 year old female w/ pmhx of A.fib rate controlled not on AC for hx of multiple falls, T2DM, HTN, HLD, ESRD on HD, CHF, CAD s/p CABG status post R fem-BK pop w/PTFE on June 21st S/P Partial ray amputation of R foot on June 23 now in MICU s/p RRT for bradycardia in setting of AVN blocker use + Cardiogenic Shock,  Bradycardia,functional Rhythm/AV willian disassociation . Pt had BS of 44, 47 this am and received D50 , endocrine following . She reports she had eggs, coffee and juice this am " the roll was too hard" Pt encouraged to drink her Nepro supplements and evening snack. She was amenable to me opening a nepro supplement for her which she was left drinking      Factors impacting intake: [ ] none [ ] nausea  [ ] vomiting [ ] diarrhea [ ] constipation  [ ]chewing problems [ ] swallowing issues  [ X] other: needs tray set up assistance and encouragement     Diet Presciption: Diet, Consistent Carbohydrate Renal w/Evening Snack:   Supplement Feeding Modality:  Oral  Nepro Cans or Servings Per Day:  1       Frequency:  Two Times a day (06-23-22 @ 16:38)    Intake: 50%    Current Weight: Weight (kg): 55.3 (06-27 @ 10:23) pre HD wt. this am 54 kg  % Weight Change: wt fluctuations day to day likely r/t fluid shifts with HD    Pertinent Medications: MEDICATIONS  (STANDING):  ampicillin/sulbactam  IVPB 3 Gram(s) IV Intermittent every 24 hours  ampicillin/sulbactam  IVPB      aspirin enteric coated 81 milliGRAM(s) Oral daily  atorvastatin 40 milliGRAM(s) Oral at bedtime  calcium acetate 667 milliGRAM(s) Oral three times a day with meals  chlorhexidine 4% Liquid 1 Application(s) Topical <User Schedule>  cloNIDine 0.1 milliGRAM(s) Oral two times a day  clopidogrel Tablet 75 milliGRAM(s) Oral daily  dextrose 5%. 1000 milliLiter(s) (50 mL/Hr) IV Continuous <Continuous>  dextrose 50% Injectable 25 Gram(s) IV Push once  DOPamine Infusion 5 MICROgram(s)/kG/Min (10.1 mL/Hr) IV Continuous <Continuous>  epoetin fawad-epbx (RETACRIT) Injectable 07517 Unit(s) IV Push <User Schedule>  glucagon  Injectable 1 milliGRAM(s) IntraMuscular once  heparin   Injectable 5000 Unit(s) SubCutaneous every 12 hours  imipramine 50 milliGRAM(s) Oral daily  insulin glargine Injectable (LANTUS) 15 Unit(s) SubCutaneous at bedtime  insulin lispro (ADMELOG) corrective regimen sliding scale   SubCutaneous three times a day before meals  insulin lispro Injectable (ADMELOG) 3 Unit(s) SubCutaneous three times a day before meals  pantoprazole    Tablet 40 milliGRAM(s) Oral before breakfast    MEDICATIONS  (PRN):  acetaminophen     Tablet .. 650 milliGRAM(s) Oral every 6 hours PRN Temp greater or equal to 38C (100.4F), Mild Pain (1 - 3)  aluminum hydroxide/magnesium hydroxide/simethicone Suspension 30 milliLiter(s) Oral every 4 hours PRN Dyspepsia  dextrose Oral Gel 15 Gram(s) Oral once PRN Blood Glucose LESS THAN 70 milliGRAM(s)/deciliter  diphenhydrAMINE Injectable 25 milliGRAM(s) IV Push <User Schedule> PRN Combative behavior  melatonin 3 milliGRAM(s) Oral at bedtime PRN Insomnia    Pertinent Labs: 06-27 Na129 mmol/L<L> Glu 264 mg/dL<H> K+ 4.5 mmol/L Cr  6.30 mg/dL<H> BUN 73 mg/dL<H> 06-27 Phos 2.2 mg/dL<L> 06-27 Alb 2.3 g/dL<L>     CAPILLARY BLOOD GLUCOSE      POCT Blood Glucose.: 112 mg/dL (27 Jun 2022 22:01)  POCT Blood Glucose.: 212 mg/dL (27 Jun 2022 17:11)  POCT Blood Glucose.: 254 mg/dL (27 Jun 2022 11:46)  POCT Blood Glucose.: 257 mg/dL (27 Jun 2022 09:57)    Skin: stage II to coccyx , surrounded by stage I and stage IV L medial malleolus    Estimated Needs:   [X ] no change since previous assessment  [ ] recalculated:     Previous Nutrition Diagnosis:   [ ] Inadequate Energy Intake [ ]Inadequate Oral Intake [ ] Excessive Energy Intake   [ ] Underweight [ ] Increased Nutrient Needs [ ] Overweight/Obesity   [ ] Altered GI Function [ ] Unintended Weight Loss [ ] Food & Nutrition Related Knowledge Deficit [ X] Malnutrition     Nutrition Diagnosis is [X ] ongoing  [ ] resolved [ ]      New Nutrition Diagnosis: [ X]  altered nutr related labs         Interventions:   Recommend  [ ] Change Diet To:  [ ] Nutrition Supplement  [ ] Nutrition Support  [ ] Other:     Monitoring and Evaluation:   [ ] PO intake [ x ] Tolerance to diet prescription [ x ] weights [ x ] labs[ x ] follow up per protocol  [ ] other: Nutr.re-assessment/follow up: 71 year old female w/ pmhx of A.fib rate controlled not on AC for hx of multiple falls, T2DM, HTN, HLD, ESRD on HD, CHF, CAD s/p CABG status post R fem-BK pop w/PTFE on June 21st S/P Partial ray amputation of R foot on June 23 now in MICU s/p RRT for bradycardia in setting of AVN blocker use + Cardiogenic Shock,  Bradycardia,functional Rhythm/AV willian disassociation . Pt had BS of 44, 47 this am and received D50 , endocrine following . She reports she had eggs, coffee and juice this am " the roll was too hard" Pt encouraged to drink her Nepro supplements and evening snack. She was amenable to me opening a nepro supplement for her which she was left drinking      Factors impacting intake: [ ] none [ ] nausea  [ ] vomiting [ ] diarrhea [ ] constipation  [ ]chewing problems [ ] swallowing issues  [ X] other: needs tray set up assistance and encouragement     Diet Presciption: Diet, Consistent Carbohydrate Renal w/Evening Snack:   Supplement Feeding Modality:  Oral  Nepro Cans or Servings Per Day:  1       Frequency:  Two Times a day (06-23-22 @ 16:38)    Intake: 50%    Current Weight: Weight (kg): 55.3 (06-27 @ 10:23) pre HD wt. this am 54 kg  % Weight Change: wt fluctuations day to day likely r/t fluid shifts with HD    Pertinent Medications: MEDICATIONS  (STANDING):  ampicillin/sulbactam  IVPB 3 Gram(s) IV Intermittent every 24 hours  ampicillin/sulbactam  IVPB      aspirin enteric coated 81 milliGRAM(s) Oral daily  atorvastatin 40 milliGRAM(s) Oral at bedtime  calcium acetate 667 milliGRAM(s) Oral three times a day with meals  chlorhexidine 4% Liquid 1 Application(s) Topical <User Schedule>  cloNIDine 0.1 milliGRAM(s) Oral two times a day  clopidogrel Tablet 75 milliGRAM(s) Oral daily  dextrose 5%. 1000 milliLiter(s) (50 mL/Hr) IV Continuous <Continuous>  dextrose 50% Injectable 25 Gram(s) IV Push once  DOPamine Infusion 5 MICROgram(s)/kG/Min (10.1 mL/Hr) IV Continuous <Continuous>  epoetin fawad-epbx (RETACRIT) Injectable 06040 Unit(s) IV Push <User Schedule>  glucagon  Injectable 1 milliGRAM(s) IntraMuscular once  heparin   Injectable 5000 Unit(s) SubCutaneous every 12 hours  imipramine 50 milliGRAM(s) Oral daily  insulin glargine Injectable (LANTUS) 15 Unit(s) SubCutaneous at bedtime  insulin lispro (ADMELOG) corrective regimen sliding scale   SubCutaneous three times a day before meals  insulin lispro Injectable (ADMELOG) 3 Unit(s) SubCutaneous three times a day before meals  pantoprazole    Tablet 40 milliGRAM(s) Oral before breakfast    MEDICATIONS  (PRN):  acetaminophen     Tablet .. 650 milliGRAM(s) Oral every 6 hours PRN Temp greater or equal to 38C (100.4F), Mild Pain (1 - 3)  aluminum hydroxide/magnesium hydroxide/simethicone Suspension 30 milliLiter(s) Oral every 4 hours PRN Dyspepsia  dextrose Oral Gel 15 Gram(s) Oral once PRN Blood Glucose LESS THAN 70 milliGRAM(s)/deciliter  diphenhydrAMINE Injectable 25 milliGRAM(s) IV Push <User Schedule> PRN Combative behavior  melatonin 3 milliGRAM(s) Oral at bedtime PRN Insomnia    Pertinent Labs: 06-27 Na129 mmol/L<L> Glu 264 mg/dL<H> K+ 4.5 mmol/L Cr  6.30 mg/dL<H> BUN 73 mg/dL<H> 06-27 Phos 2.2 mg/dL<L> 06-27 Alb 2.3 g/dL<L>     CAPILLARY BLOOD GLUCOSE      POCT Blood Glucose.: 112 mg/dL (27 Jun 2022 22:01)  POCT Blood Glucose.: 212 mg/dL (27 Jun 2022 17:11)  POCT Blood Glucose.: 254 mg/dL (27 Jun 2022 11:46)  POCT Blood Glucose.: 257 mg/dL (27 Jun 2022 09:57)    Skin: stage II to coccyx , surrounded by stage I and stage IV L medial malleolus ( see supplement recommendations below)     Estimated Needs:   [X ] no change since previous assessment  [ ] recalculated:     Previous Nutrition Diagnosis:   [ ] Inadequate Energy Intake [ ]Inadequate Oral Intake [ ] Excessive Energy Intake   [ ] Underweight [ ] Increased Nutrient Needs [ ] Overweight/Obesity   [ ] Altered GI Function [ ] Unintended Weight Loss [ ] Food & Nutrition Related Knowledge Deficit [ X] Malnutrition     Nutrition Diagnosis is [X ] ongoing  [ ] resolved [ ]      New Nutrition Diagnosis: [ X]  altered nutr related labs      goal bg 140-180 in icu setting    Interventions:   Recommend  [ ] Change Diet To:  [X ] Nutrition Supplement: nephrovite 1 tab po daily, NC prosource 30 ml po BID, Abel 8 oz po daily , Znso4 220 mg po x 10 days  [ ] Nutrition Support  [ X] Other: continue assist with tray set up and provide encouragement for meals and Nepro supplements    Monitoring and Evaluation:   [X ] PO intake [ x ] Tolerance to diet prescription [ x ] weights [ x ] labs[ x ] follow up per protocol  [ ] other:

## 2022-06-28 NOTE — PROGRESS NOTE ADULT - PROBLEM SELECTOR PLAN 1
S/p Right hallux amputation POD1, (DOS: 6/23/22)  - s/p RLE bypass graft  - Pt seen and evaluated.  -  Intact. Previously dressed with acticoat and DSD  - Continue IV abx per ID  - Continue vascular recs  - Continue med management   - OR culture Staph Aureus & E. faecalis  - OR pathology: right 1st met clean margin: Minimal chronic osteomyelitis  - Pt to partial weight bear to the right heel as tolerated with assisted device (pt has a history of falls)  - No further podiatric intervention at this time. Pt is stable from out standpoint    WOUND CARE INSTRUCTIONS  1. Please leave dressing clean, dry and intact until follow-up. Monitor for any signs of infection and present to the ED if they arise.  2. If the dressing gets wet, please change with dry, sterile dressing.  3. Patient to be partial weight bear to the right heel as tolerated with assisted device (pt has a history of falls)  4. Patient is to [facility] with Dr. Lau or Dr. Pastor within 3-5 days upon discharge. Please call [phone] as soon as discharged and state that it is for a "post-op appointment".

## 2022-06-28 NOTE — PROGRESS NOTE ADULT - SUBJECTIVE AND OBJECTIVE BOX
Interval Events:   Patient seen and examined at bedside. She is doing well, laying in bed and appears to be in NAD. She continues to complain of mild pain in R leg where fem-pop bypass surgery was done. Yesterday, patient s/p rapid response for bradycardia and was started on Dobutamine gtt an given dose of Bicarb IVP x1, Glucagon IVP x1, and Calcium IVP x1. She is now off Dobutamine and holding all AV willian blockade. HRs now in the 80s and she is hemodynamically stable for transfer to floors.     Review of Systems:  Constitutional: No fever, chills, fatigue  Neuro: No headache, numbness, weakness  Resp: No cough, wheezing, shortness of breath  CVS: No chest pain, palpitations, leg swelling  GI: No abdominal pain, nausea, vomiting, diarrhea   : No dysuria, frequency, incontinence  Skin: No itching, burning, rashes, or lesions   Msk: + R leg pain   Psych: No depression, anxiety, mood swings    ICU Vital Signs Last 24 Hrs  T(C): 36.8 (28 Jun 2022 11:54), Max: 36.8 (28 Jun 2022 11:54)  T(F): 98.3 (28 Jun 2022 11:54), Max: 98.3 (28 Jun 2022 11:54)  HR: 107 (28 Jun 2022 11:00) (63 - 119)  BP: 123/59 (28 Jun 2022 11:00) (101/49 - 178/98)  BP(mean): 81 (28 Jun 2022 11:00) (71 - 119)  ABP: --  ABP(mean): --  RR: 19 (28 Jun 2022 11:00) (12 - 38)  SpO2: 96% (28 Jun 2022 11:00) (89% - 99%)        06-27-22 @ 07:01  -  06-28-22 @ 07:00  --------------------------------------------------------  IN: 624.6 mL / OUT: 1500 mL / NET: -875.4 mL        CAPILLARY BLOOD GLUCOSE      POCT Blood Glucose.: 127 mg/dL (28 Jun 2022 11:10)      I&O's Summary    27 Jun 2022 07:01  -  28 Jun 2022 07:00  --------------------------------------------------------  IN: 624.6 mL / OUT: 1500 mL / NET: -875.4 mL    Physical Exam:   Gen: Frail elderly female   Neuro: Awake, alert, AAO x 3  Resp: CTA b/l   CVS: Irregular irregular rhythm   Abd: Soft and non tender   Ext: Sutures on R thigh and leg c/d/i, and wound dress on R foot c/d/i     Meds:  ampicillin/sulbactam  IVPB IV Intermittent  ampicillin/sulbactam  IVPB     cloNIDine Oral  metoprolol tartrate Oral    atorvastatin Oral  dextrose 50% Injectable IV Push  dextrose Oral Gel Oral PRN  glucagon  Injectable IntraMuscular  insulin glargine Injectable (LANTUS) SubCutaneous  insulin lispro (ADMELOG) corrective regimen sliding scale SubCutaneous      acetaminophen     Tablet .. Oral PRN  imipramine Oral  melatonin Oral PRN      aspirin enteric coated Oral  clopidogrel Tablet Oral  heparin   Injectable SubCutaneous    aluminum hydroxide/magnesium hydroxide/simethicone Suspension Oral PRN  pantoprazole    Tablet Oral      dextrose 5%. IV Continuous    epoetin fawad-epbx (RETACRIT) Injectable IV Push    chlorhexidine 4% Liquid Topical                              8.2    16.04 )-----------( 308      ( 28 Jun 2022 08:20 )             26.2       06-28    135  |  95<L>  |  31<H>  ----------------------------<  47<LL>  3.4<L>   |  30  |  3.40<H>    Ca    9.7      28 Jun 2022 08:20  Phos  2.2     06-27  Mg     2.9     06-27    TPro  6.1  /  Alb  2.2<L>  /  TBili  0.4  /  DBili  x   /  AST  30  /  ALT  8<L>  /  AlkPhos  101  06-28              .Tissue Other, right hallux bone culture   Few Staphylococcus aureus  Rare Enterococcus faecalis   Rare polymorphonuclear leukocytes seen per low power field  Few Gram positive cocci in pairs seen per oil power field 06-23 @ 15:35    Radiology: < from: Xray Chest 1 View-PORTABLE IMMEDIATE (Xray Chest 1 View-PORTABLE IMMEDIATE .) (06.27.22 @ 10:50) >  ACC: [id number] EXAM:  XR CHEST PORTABLE IMMED 1V                          PROCEDURE DATE:  06/27/2022          INTERPRETATION:  INDICATION: Hypoxia, bradycardia, hypoglycemia    COMPARISON: 6/15/2022    FINDINGS:  A semiupright portable chest x-ray demonstrates a mildly enlarged heart   however associated with interstitial pulmonary edema and probable basilar   effusions. The vascularity is increased from the previous exam. There are   no other significant changes. There are no focal lobar consolidations to   indicate pneumonia. There is no pneumothorax. The bony thorax remains   stable.    IMPRESSION:  1. Cardiomegaly with interstitial pulmonary edema as a change compared to   the previous study. Small bibasilar pleural effusions are suspected as   well.    --- End of Report ---            FABIO CHIRINOS MD; Attending Radiologist  This document has been electronically signed. Jun 27 2022  4:14PM    < end of copied text >      Bedside Ultrasound: N/A     Tubes/Lines: None       GLOBAL ISSUE/BEST PRACTICE:  Analgesia: Y   Sedation: N   HOB elevation: Y  Stress ulcer prophylaxis: Y   VTE prophylaxis: Y   Glycemic control: Y   Nutrition: Y     CODE STATUS:        Interval Events:   Patient seen and examined at bedside. She is doing well, laying in bed and appears to be in NAD. She continues to complain of mild pain in R leg where fem-pop bypass surgery was done. Yesterday, patient s/p rapid response for bradycardia and was started on Dobutamine gtt an given dose of Bicarb IVP x1, Glucagon IVP x1, and Calcium IVP x1. She is now off Dobutamine and holding all AV willian blockade. HRs now in the 80s and she is hemodynamically stable for transfer to floors.     Review of Systems:  Constitutional: No fever, chills, fatigue  Neuro: No headache, numbness, weakness  Resp: No cough, wheezing, shortness of breath  CVS: No chest pain, palpitations, leg swelling  GI: No abdominal pain, nausea, vomiting, diarrhea   : No dysuria, frequency, incontinence  Skin: No itching, burning, rashes, or lesions   Msk: + R leg pain   Psych: No depression, anxiety, mood swings    ICU Vital Signs Last 24 Hrs  T(C): 36.8 (28 Jun 2022 11:54), Max: 36.8 (28 Jun 2022 11:54)  T(F): 98.3 (28 Jun 2022 11:54), Max: 98.3 (28 Jun 2022 11:54)  HR: 107 (28 Jun 2022 11:00) (63 - 119)  BP: 123/59 (28 Jun 2022 11:00) (101/49 - 178/98)  BP(mean): 81 (28 Jun 2022 11:00) (71 - 119)  ABP: --  ABP(mean): --  RR: 19 (28 Jun 2022 11:00) (12 - 38)  SpO2: 96% (28 Jun 2022 11:00) (89% - 99%)        06-27-22 @ 07:01  -  06-28-22 @ 07:00  --------------------------------------------------------  IN: 624.6 mL / OUT: 1500 mL / NET: -875.4 mL        CAPILLARY BLOOD GLUCOSE      POCT Blood Glucose.: 127 mg/dL (28 Jun 2022 11:10)      I&O's Summary    27 Jun 2022 07:01  -  28 Jun 2022 07:00  --------------------------------------------------------  IN: 624.6 mL / OUT: 1500 mL / NET: -875.4 mL    Physical Exam:   Gen: Frail elderly female, somnolent   Neuro: Awake, alert, AAO x 3  Resp: CTA b/l   CVS: Irregular irregular rhythm   Abd: Soft and non tender   Ext: Sutures on R thigh and leg c/d/i, and wound dress on R foot c/d/i     Meds:  ampicillin/sulbactam  IVPB IV Intermittent  ampicillin/sulbactam  IVPB     cloNIDine Oral  metoprolol tartrate Oral    atorvastatin Oral  dextrose 50% Injectable IV Push  dextrose Oral Gel Oral PRN  glucagon  Injectable IntraMuscular  insulin glargine Injectable (LANTUS) SubCutaneous  insulin lispro (ADMELOG) corrective regimen sliding scale SubCutaneous      acetaminophen     Tablet .. Oral PRN  imipramine Oral  melatonin Oral PRN      aspirin enteric coated Oral  clopidogrel Tablet Oral  heparin   Injectable SubCutaneous    aluminum hydroxide/magnesium hydroxide/simethicone Suspension Oral PRN  pantoprazole    Tablet Oral      dextrose 5%. IV Continuous    epoetin fawad-epbx (RETACRIT) Injectable IV Push    chlorhexidine 4% Liquid Topical                              8.2    16.04 )-----------( 308      ( 28 Jun 2022 08:20 )             26.2       06-28    135  |  95<L>  |  31<H>  ----------------------------<  47<LL>  3.4<L>   |  30  |  3.40<H>    Ca    9.7      28 Jun 2022 08:20  Phos  2.2     06-27  Mg     2.9     06-27    TPro  6.1  /  Alb  2.2<L>  /  TBili  0.4  /  DBili  x   /  AST  30  /  ALT  8<L>  /  AlkPhos  101  06-28              .Tissue Other, right hallux bone culture   Few Staphylococcus aureus  Rare Enterococcus faecalis   Rare polymorphonuclear leukocytes seen per low power field  Few Gram positive cocci in pairs seen per oil power field 06-23 @ 15:35    Radiology: < from: Xray Chest 1 View-PORTABLE IMMEDIATE (Xray Chest 1 View-PORTABLE IMMEDIATE .) (06.27.22 @ 10:50) >  ACC: [id number] EXAM:  XR CHEST PORTABLE IMMED 1V                          PROCEDURE DATE:  06/27/2022          INTERPRETATION:  INDICATION: Hypoxia, bradycardia, hypoglycemia    COMPARISON: 6/15/2022    FINDINGS:  A semiupright portable chest x-ray demonstrates a mildly enlarged heart   however associated with interstitial pulmonary edema and probable basilar   effusions. The vascularity is increased from the previous exam. There are   no other significant changes. There are no focal lobar consolidations to   indicate pneumonia. There is no pneumothorax. The bony thorax remains   stable.    IMPRESSION:  1. Cardiomegaly with interstitial pulmonary edema as a change compared to   the previous study. Small bibasilar pleural effusions are suspected as   well.    --- End of Report ---            FABIO CHIRINOS MD; Attending Radiologist  This document has been electronically signed. Jun 27 2022  4:14PM    < end of copied text >      Bedside Ultrasound: N/A     Tubes/Lines: None       GLOBAL ISSUE/BEST PRACTICE:  Analgesia: Y   Sedation: N   HOB elevation: Y  Stress ulcer prophylaxis: Y   VTE prophylaxis: Y   Glycemic control: Y   Nutrition: Y     CODE STATUS:        Interval Events:   Patient seen and examined at bedside.   Yesterday, patient s/p rapid response for bradycardia and was started on Dobutamine gtt an given dose of Bicarb IVP x1, Glucagon IVP x1, and Calcium IVP x1. She is now off Dobutamine since 3pm yesterday and holding all AV willian blockade. HRs now in the 80s and she is hemodynamically stable   She is doing well, laying in bed and appears to be in NAD. She continues to complain of mild pain in R leg where fem-pop bypass surgery was done    Review of Systems:  Constitutional: No fever, chills, fatigue  Neuro: No headache, numbness, weakness  Resp: No cough, wheezing, shortness of breath  CVS: No chest pain, palpitations, leg swelling  GI: No abdominal pain, nausea, vomiting, diarrhea   : No dysuria, frequency, incontinence  Skin: No itching, burning, rashes, or lesions   Msk: + R leg pain   Psych: No depression, anxiety, mood swings    ICU Vital Signs Last 24 Hrs  T(C): 36.8 (28 Jun 2022 11:54), Max: 36.8 (28 Jun 2022 11:54)  T(F): 98.3 (28 Jun 2022 11:54), Max: 98.3 (28 Jun 2022 11:54)  HR: 107 (28 Jun 2022 11:00) (63 - 119)  BP: 123/59 (28 Jun 2022 11:00) (101/49 - 178/98)  BP(mean): 81 (28 Jun 2022 11:00) (71 - 119)  ABP: --  ABP(mean): --  RR: 19 (28 Jun 2022 11:00) (12 - 38)  SpO2: 96% (28 Jun 2022 11:00) (89% - 99%)        06-27-22 @ 07:01  -  06-28-22 @ 07:00  --------------------------------------------------------  IN: 624.6 mL / OUT: 1500 mL / NET: -875.4 mL        CAPILLARY BLOOD GLUCOSE      POCT Blood Glucose.: 127 mg/dL (28 Jun 2022 11:10)      I&O's Summary    27 Jun 2022 07:01  -  28 Jun 2022 07:00  --------------------------------------------------------  IN: 624.6 mL / OUT: 1500 mL / NET: -875.4 mL    Physical Exam:   Gen: Frail elderly female, somnolent   Neuro: Awake, alert, AAO x 3  Resp: CTA b/l   CVS: Irregular irregular rhythm   Abd: Soft and non tender   Ext: Sutures on R thigh and leg c/d/i, and wound dress on R foot c/d/i     Meds:  ampicillin/sulbactam  IVPB IV Intermittent  ampicillin/sulbactam  IVPB     cloNIDine Oral  metoprolol tartrate Oral    atorvastatin Oral  dextrose 50% Injectable IV Push  dextrose Oral Gel Oral PRN  glucagon  Injectable IntraMuscular  insulin glargine Injectable (LANTUS) SubCutaneous  insulin lispro (ADMELOG) corrective regimen sliding scale SubCutaneous      acetaminophen     Tablet .. Oral PRN  imipramine Oral  melatonin Oral PRN      aspirin enteric coated Oral  clopidogrel Tablet Oral  heparin   Injectable SubCutaneous    aluminum hydroxide/magnesium hydroxide/simethicone Suspension Oral PRN  pantoprazole    Tablet Oral      dextrose 5%. IV Continuous    epoetin fawad-epbx (RETACRIT) Injectable IV Push    chlorhexidine 4% Liquid Topical                              8.2    16.04 )-----------( 308      ( 28 Jun 2022 08:20 )             26.2       06-28    135  |  95<L>  |  31<H>  ----------------------------<  47<LL>  3.4<L>   |  30  |  3.40<H>    Ca    9.7      28 Jun 2022 08:20  Phos  2.2     06-27  Mg     2.9     06-27    TPro  6.1  /  Alb  2.2<L>  /  TBili  0.4  /  DBili  x   /  AST  30  /  ALT  8<L>  /  AlkPhos  101  06-28              .Tissue Other, right hallux bone culture   Few Staphylococcus aureus  Rare Enterococcus faecalis   Rare polymorphonuclear leukocytes seen per low power field  Few Gram positive cocci in pairs seen per oil power field 06-23 @ 15:35    Radiology: < from: Xray Chest 1 View-PORTABLE IMMEDIATE (Xray Chest 1 View-PORTABLE IMMEDIATE .) (06.27.22 @ 10:50) >  ACC: [id number] EXAM:  XR CHEST PORTABLE IMMED 1V                          PROCEDURE DATE:  06/27/2022          INTERPRETATION:  INDICATION: Hypoxia, bradycardia, hypoglycemia    COMPARISON: 6/15/2022    FINDINGS:  A semiupright portable chest x-ray demonstrates a mildly enlarged heart   however associated with interstitial pulmonary edema and probable basilar   effusions. The vascularity is increased from the previous exam. There are   no other significant changes. There are no focal lobar consolidations to   indicate pneumonia. There is no pneumothorax. The bony thorax remains   stable.    IMPRESSION:  1. Cardiomegaly with interstitial pulmonary edema as a change compared to   the previous study. Small bibasilar pleural effusions are suspected as   well.    --- End of Report ---            FABIO CHIRINOS MD; Attending Radiologist  This document has been electronically signed. Jun 27 2022  4:14PM    < end of copied text >      Bedside Ultrasound: N/A     Tubes/Lines: None       GLOBAL ISSUE/BEST PRACTICE:  Analgesia: Y   Sedation: N   HOB elevation: Y  Stress ulcer prophylaxis: Y   VTE prophylaxis: Y   Glycemic control: Y   Nutrition: Y     CODE STATUS:

## 2022-06-28 NOTE — PROGRESS NOTE ADULT - ASSESSMENT
70 yo woman with PMH of HTN, DM2, CAD, CHF, PAD, A.fib, multiple falls and ESRD on HD was admitted on 6/16, came to ED with generalized weakness.   Her hypoglycemia and hypothermia on admission could be related to sepsis/SIRS. Source likely sec to Right hallux with a dry gangrene and superimposed infection.   TOMMY/PVR's noted.   Now S/P R fem-BK pop w/PTFE                 POD# 7  Now S/P Partial ray amputation of R foot       POD # 5  Bypass patent. RLE well perfused. No evidence of infection  Rapid response called yesterday for symptomatic bradycardia-remains in ICU    #PAD Chronic limb ischemia with multilevel occlusive PAD on right.  On DAPT for now.   Recommend starting Xarelto 2.5 mg po bid with Plavix on DC for graft patency  H/O fall risk, however plan for MEGHAN and monitored/assisted ambulation    #Symptomatic bradycardia  Cardiology following  BB and Ca channel blocker dc'd  Cardiology following    #Leukocytosis  No evidence of infection of RLE bypass wounds  ID following    #ESRD on HD  HD as per renal    #DM uncontrolled  Lantus and SS coverage as per medicine    Discussed with Dr Miller 70 yo woman with PMH of HTN, DM2, CAD, CHF, PAD, A.fib, multiple falls and ESRD on HD was admitted on 6/16, came to ED with generalized weakness.   Her hypoglycemia and hypothermia on admission could be related to sepsis/SIRS. Source likely sec to Right hallux with a dry gangrene and superimposed infection.   TOMMY/PVR's noted.   Now S/P R fem-BK pop w/PTFE                 POD# 7  Now S/P Partial ray amputation of R foot       POD # 5  Bypass patent. RLE well perfused. No evidence of infection  Rapid response called yesterday for symptomatic bradycardia-remains in ICU    #PAD Chronic limb ischemia with multilevel occlusive PAD on right.  On DAPT for now.   Recommend starting Xarelto 2.5 mg po bid with Plavix on DC for graft patency  H/O fall risk, however plan for MEGHAN and monitored/assisted ambulation  Sutures to be removed in 10-14 days.    #Symptomatic bradycardia  Cardiology following  BB and Ca channel blocker dc'd  Cardiology following    #Leukocytosis  No evidence of infection of RLE bypass wounds  ID following    #ESRD on HD  HD as per renal    #DM uncontrolled  Lantus and SS coverage as per medicine    No active vascular issues at this time. Will sign off.  Reconsult as needed  Discussed with Dr Miller

## 2022-06-28 NOTE — PROGRESS NOTE ADULT - SUBJECTIVE AND OBJECTIVE BOX
Patient is a 71y Female whom presented to the hospital with esrd on hd     PAST MEDICAL & SURGICAL HISTORY:  Diabetes mellitus II      HTN (hypertension)      h/o Anxiety attack      Depression      h/o Myocardial infarct 2007      CAD (coronary artery disease)      h/o Hepatitis A 1969  currently resolved, no symptoms      PAD (peripheral artery disease)      Murmur, cardiac      h/o Smoking  quitted 3/2012      CRF (chronic renal failure), unspecified stage      Dialysis patient      Anemia secondary to renal failure      HTN (hypertension)      coronary stent 2007      s/p Ovarian cyst removal      s/p surgical removal of benign Skin lesion epigastric area          MEDICATIONS  (STANDING):  amLODIPine   Tablet 5 milliGRAM(s) Oral daily  aspirin enteric coated 81 milliGRAM(s) Oral daily  atorvastatin 40 milliGRAM(s) Oral at bedtime  calcium acetate 667 milliGRAM(s) Oral three times a day with meals  cefTRIAXone   IVPB 1000 milliGRAM(s) IV Intermittent every 24 hours  cloNIDine 0.1 milliGRAM(s) Oral two times a day  clopidogrel Tablet 75 milliGRAM(s) Oral daily  dextrose 5%. 1000 milliLiter(s) (100 mL/Hr) IV Continuous <Continuous>  dextrose 5%. 1000 milliLiter(s) (50 mL/Hr) IV Continuous <Continuous>  dextrose 50% Injectable 25 Gram(s) IV Push once  dextrose 50% Injectable 12.5 Gram(s) IV Push once  dextrose 50% Injectable 25 Gram(s) IV Push once  diltiazem    Tablet 60 milliGRAM(s) Oral every 8 hours  glucagon  Injectable 1 milliGRAM(s) IntraMuscular once  heparin   Injectable 5000 Unit(s) SubCutaneous every 12 hours  imipramine 50 milliGRAM(s) Oral daily  insulin glargine Injectable (LANTUS) 12 Unit(s) SubCutaneous at bedtime  insulin lispro (ADMELOG) corrective regimen sliding scale   SubCutaneous three times a day before meals  insulin lispro (ADMELOG) corrective regimen sliding scale   SubCutaneous at bedtime  metoprolol tartrate 100 milliGRAM(s) Oral every 12 hours  pantoprazole    Tablet 40 milliGRAM(s) Oral before breakfast  povidone iodine 10% Solution 1 Application(s) Topical daily      Allergies    latex (Unknown)  No Known Drug Allergies    Intolerances        SOCIAL HISTORY:  Denies ETOh,Smoking,     FAMILY HISTORY:  No pertinent family history in first degree relatives        REVIEW OF SYSTEMS:    CONSTITUTIONAL: No weakness, fevers or chills  RESPIRATORY: No cough, wheezing, hemoptysis; No shortness of breath  CARDIOVASCULAR: No chest pain or palpitations  GASTROINTESTINAL: No abdominal or epigastric pain. No nausea, vomiting,     No diarrhea or constipation. No melena   SKIN: dry                                                                                                  8.2    16.04 )-----------( 308      ( 28 Jun 2022 08:20 )             26.2       CBC Full  -  ( 28 Jun 2022 08:20 )  WBC Count : 16.04 K/uL  RBC Count : 2.93 M/uL  Hemoglobin : 8.2 g/dL  Hematocrit : 26.2 %  Platelet Count - Automated : 308 K/uL  Mean Cell Volume : 89.4 fl  Mean Cell Hemoglobin : 28.0 pg  Mean Cell Hemoglobin Concentration : 31.3 gm/dL  Auto Neutrophil # : x  Auto Lymphocyte # : x  Auto Monocyte # : x  Auto Eosinophil # : x  Auto Basophil # : x  Auto Neutrophil % : x  Auto Lymphocyte % : x  Auto Monocyte % : x  Auto Eosinophil % : x  Auto Basophil % : x      06-28    135  |  95<L>  |  31<H>  ----------------------------<  47<LL>  3.4<L>   |  30  |  3.40<H>    Ca    9.7      28 Jun 2022 08:20  Phos  2.2     06-27  Mg     2.9     06-27    TPro  6.1  /  Alb  2.2<L>  /  TBili  0.4  /  DBili  x   /  AST  30  /  ALT  8<L>  /  AlkPhos  101  06-28      CAPILLARY BLOOD GLUCOSE      POCT Blood Glucose.: 203 mg/dL (28 Jun 2022 16:50)  POCT Blood Glucose.: 127 mg/dL (28 Jun 2022 11:10)  POCT Blood Glucose.: 104 mg/dL (28 Jun 2022 10:25)  POCT Blood Glucose.: 71 mg/dL (28 Jun 2022 09:11)  POCT Blood Glucose.: 47 mg/dL (28 Jun 2022 08:36)  POCT Blood Glucose.: 44 mg/dL (28 Jun 2022 08:34)  POCT Blood Glucose.: 112 mg/dL (27 Jun 2022 22:01)      Vital Signs Last 24 Hrs  T(C): 37.2 (28 Jun 2022 17:17), Max: 37.2 (28 Jun 2022 17:17)  T(F): 98.9 (28 Jun 2022 17:17), Max: 98.9 (28 Jun 2022 17:17)  HR: 114 (28 Jun 2022 17:17) (79 - 119)  BP: 111/59 (28 Jun 2022 17:17) (108/55 - 156/90)  BP(mean): 87 (28 Jun 2022 14:05) (79 - 101)  RR: 18 (28 Jun 2022 17:17) (14 - 26)  SpO2: 91% (28 Jun 2022 17:17) (90% - 99%)            PHYSICAL EXAM:    Constitutional: NAD  HEENT: conjunctive   clear   Neck:  No JVD  Respiratory: CTAB  Cardiovascular: S1 and S2  Gastrointestinal: BS+, soft, NT/ND  Skin: dry

## 2022-06-28 NOTE — PROGRESS NOTE ADULT - SUBJECTIVE AND OBJECTIVE BOX
Patient is a 71y old  Female who presents with a chief complaint of SIRS (28 Jun 2022 15:10)      INTERVAL History of Present Illness/OVERNIGHT EVENTS: improved clinical status  hypoglycemia due to poor oral intake    MEDICATIONS  (STANDING):  ampicillin/sulbactam  IVPB 3 Gram(s) IV Intermittent every 24 hours  ampicillin/sulbactam  IVPB      aspirin enteric coated 81 milliGRAM(s) Oral daily  atorvastatin 40 milliGRAM(s) Oral at bedtime  chlorhexidine 4% Liquid 1 Application(s) Topical <User Schedule>  cloNIDine 0.1 milliGRAM(s) Oral two times a day  clopidogrel Tablet 75 milliGRAM(s) Oral daily  dextrose 5%. 1000 milliLiter(s) (50 mL/Hr) IV Continuous <Continuous>  dextrose 50% Injectable 25 Gram(s) IV Push once  epoetin fawad-epbx (RETACRIT) Injectable 65445 Unit(s) IV Push <User Schedule>  glucagon  Injectable 1 milliGRAM(s) IntraMuscular once  heparin   Injectable 5000 Unit(s) SubCutaneous every 12 hours  imipramine 50 milliGRAM(s) Oral daily  insulin glargine Injectable (LANTUS) 7 Unit(s) SubCutaneous at bedtime  insulin lispro (ADMELOG) corrective regimen sliding scale   SubCutaneous three times a day before meals  metoprolol tartrate 50 milliGRAM(s) Oral two times a day  pantoprazole    Tablet 40 milliGRAM(s) Oral before breakfast    MEDICATIONS  (PRN):  acetaminophen     Tablet .. 650 milliGRAM(s) Oral every 6 hours PRN Temp greater or equal to 38C (100.4F), Mild Pain (1 - 3)  aluminum hydroxide/magnesium hydroxide/simethicone Suspension 30 milliLiter(s) Oral every 4 hours PRN Dyspepsia  dextrose Oral Gel 15 Gram(s) Oral once PRN Blood Glucose LESS THAN 70 milliGRAM(s)/deciliter  melatonin 3 milliGRAM(s) Oral at bedtime PRN Insomnia      Allergies    latex (Unknown)  No Known Drug Allergies    Intolerances        REVIEW OF SYSTEMS:  Negative unless otherwise specified above.    Vital Signs Last 24 Hrs  T(C): 36.9 (28 Jun 2022 14:44), Max: 36.9 (28 Jun 2022 14:44)  T(F): 98.4 (28 Jun 2022 14:44), Max: 98.4 (28 Jun 2022 14:44)  HR: 110 (28 Jun 2022 14:44) (68 - 119)  BP: 121/64 (28 Jun 2022 14:44) (108/55 - 178/98)  BP(mean): 87 (28 Jun 2022 14:05) (79 - 119)  RR: 16 (28 Jun 2022 14:44) (14 - 38)  SpO2: 98% (28 Jun 2022 14:44) (90% - 99%)        PHYSICAL EXAM:  GENERAL: No apparent distress, appears improved  HEAD:  Atraumatic, Normocephalic  EYES: Conjunctiva and sclera clear, no discharge  NECK: Supple, no JVD  CHEST/LUNG: Air entry bilaterally  HEART: Tachycardia, no rubs or gallops  ABDOMEN: Soft, Nontender, Nondistended  EXTREMITIES:  No clubbing, cyanosis or edema  SKIN: Right 1st toe amputation, stitches right thigh site  NERVOUS SYSTEM:  Alert & Oriented; Bilateral Lower extremity mobile, sensation to light touch intact      LABS:                        8.2    16.04 )-----------( 308      ( 28 Jun 2022 08:20 )             26.2     28 Jun 2022 08:20    135    |  95     |  31     ----------------------------<  47     3.4     |  30     |  3.40     Ca    9.7        28 Jun 2022 08:20    TPro  6.1    /  Alb  2.2    /  TBili  0.4    /  DBili  x      /  AST  30     /  ALT  8      /  AlkPhos  101    28 Jun 2022 08:20        CAPILLARY BLOOD GLUCOSE      POCT Blood Glucose.: 127 mg/dL (28 Jun 2022 11:10)  POCT Blood Glucose.: 104 mg/dL (28 Jun 2022 10:25)  POCT Blood Glucose.: 71 mg/dL (28 Jun 2022 09:11)  POCT Blood Glucose.: 47 mg/dL (28 Jun 2022 08:36)  POCT Blood Glucose.: 44 mg/dL (28 Jun 2022 08:34)  POCT Blood Glucose.: 112 mg/dL (27 Jun 2022 22:01)  POCT Blood Glucose.: 212 mg/dL (27 Jun 2022 17:11)      RADIOLOGY & ADDITIONAL TESTS:      Images reviewed personally    Consultant Notes Reviewed and Care Discussed with relevant Consultants.

## 2022-06-28 NOTE — PROGRESS NOTE ADULT - ASSESSMENT
pt with hypoglycemia  elevated troponin due to renal failure  ashd , s/p mi  s/p coronary stent  s/p cabg  chf-hfref  esrd - on hd  dm2  hypertension  paf -not on oac - fall risk  pvd - s/p stent  dyslipidemia   chf - compensated - recent coronary angiogram - patent graft -pt's cradiac status stable low  to intermediate risk for  pvd surgery and amputation of great  toe if needed  6/21  pt tolerated rt femoral -poplitael by-pass 6/21  cardiac status stable low risk for rt big toe amputation 6/23  pt tolerated amputation of left great toe 6/23  pt had bradycardia and hypotension- transferred to icu- had atropine and dopamine-metoprolol and cardizem dc 6/27  now sinus tachycardia and bp improved 6/28

## 2022-06-28 NOTE — PROGRESS NOTE ADULT - SUBJECTIVE AND OBJECTIVE BOX
71y year old Female seen at MICU. Pt transferred due to RRT with Bradycardia. Pt  S/p Right hallux amputation (DOS: 6/23/22). Pt doing well. Pt verbal but lethargic. Pt being monitored in MICU.   Allergies    latex (Unknown)  No Known Drug Allergies    Intolerances    MEDICATIONS  (STANDING):  aspirin enteric coated 81 milliGRAM(s) Oral daily  atorvastatin 40 milliGRAM(s) Oral at bedtime  calcium acetate 667 milliGRAM(s) Oral three times a day with meals  cefTRIAXone   IVPB 2000 milliGRAM(s) IV Intermittent every 24 hours  chlorhexidine 4% Liquid 1 Application(s) Topical <User Schedule>  cloNIDine 0.1 milliGRAM(s) Oral two times a day  clopidogrel Tablet 75 milliGRAM(s) Oral daily  dextrose 5%. 1000 milliLiter(s) (50 mL/Hr) IV Continuous <Continuous>  dextrose 50% Injectable 25 Gram(s) IV Push once  DOPamine Infusion 5 MICROgram(s)/kG/Min (10.1 mL/Hr) IV Continuous <Continuous>  epoetin fawad-epbx (RETACRIT) Injectable 51240 Unit(s) IV Push <User Schedule>  glucagon  Injectable 1 milliGRAM(s) IntraMuscular once  heparin   Injectable 5000 Unit(s) SubCutaneous every 12 hours  imipramine 50 milliGRAM(s) Oral daily  insulin glargine Injectable (LANTUS) 15 Unit(s) SubCutaneous at bedtime  insulin lispro (ADMELOG) corrective regimen sliding scale   SubCutaneous three times a day before meals  insulin lispro Injectable (ADMELOG) 3 Unit(s) SubCutaneous three times a day before meals  pantoprazole    Tablet 40 milliGRAM(s) Oral before breakfast    MEDICATIONS  (PRN):  acetaminophen     Tablet .. 650 milliGRAM(s) Oral every 6 hours PRN Temp greater or equal to 38C (100.4F), Mild Pain (1 - 3)  aluminum hydroxide/magnesium hydroxide/simethicone Suspension 30 milliLiter(s) Oral every 4 hours PRN Dyspepsia  dextrose Oral Gel 15 Gram(s) Oral once PRN Blood Glucose LESS THAN 70 milliGRAM(s)/deciliter  diphenhydrAMINE Injectable 25 milliGRAM(s) IV Push <User Schedule> PRN Combative behavior  melatonin 3 milliGRAM(s) Oral at bedtime PRN Insomnia    Vital Signs Last 24 Hrs  T(C): 36.9 (28 Jun 2022 14:44), Max: 36.9 (28 Jun 2022 14:44)  T(F): 98.4 (28 Jun 2022 14:44), Max: 98.4 (28 Jun 2022 14:44)  HR: 110 (28 Jun 2022 14:44) (67 - 119)  BP: 121/64 (28 Jun 2022 14:44) (108/55 - 178/98)  BP(mean): 87 (28 Jun 2022 14:00) (79 - 119)  RR: 16 (28 Jun 2022 14:44) (14 - 38)  SpO2: 98% (28 Jun 2022 14:44) (89% - 99%)    CBC Full  -  ( 28 Jun 2022 08:20 )  WBC Count : 16.04 K/uL  RBC Count : 2.93 M/uL  Hemoglobin : 8.2 g/dL  Hematocrit : 26.2 %  Platelet Count - Automated : 308 K/uL  Mean Cell Volume : 89.4 fl  Mean Cell Hemoglobin : 28.0 pg  Mean Cell Hemoglobin Concentration : 31.3 gm/dL  Auto Neutrophil # : x  Auto Lymphocyte # : x  Auto Monocyte # : x  Auto Eosinophil # : x  Auto Basophil # : x  Auto Neutrophil % : x  Auto Lymphocyte % : x  Auto Monocyte % : x  Auto Eosinophil % : x  Auto Basophil % : x      PHYSICAL EXAM:  Vascular: Right DP & PT dopplerable.  Capillary refill (CFT) 3 seconds. Digital hair absent bilaterally.   Neurological: Light touch sensation diminished bilaterally.  Musculoskeletal: s/p right hallux amputation. strength in all quadrants bilaterally, AJ & STJ ROM absent b/l.   Dermatological: sutures to right hallux intact. No signs of wound dehiscence. No signs of infection.       Culture - Tissue with Gram Stain (collected 23 Jun 2022 15:35)  Source: .Tissue Other, right hallux bone culture  Gram Stain (24 Jun 2022 04:37):    Rare polymorphonuclear leukocytes seen per low power field    Few Gram positive cocci in pairs seen per oil power field        ACCESSION No:  30 [id number]  Patient:   SHILO KOHLER      Accession:                             [id number]    Collected Date/Time:                   6/23/2022 15:35 EDT  Received Date/Time:                    6/24/2022 07:44 EDT    Surgical Pathology Report - Auth (Verified)    Specimen(s) Submitted  1  Right hallux pathology  2  Right first metatarsal/clean margin pathology    Final Diagnosis  1. Right hallux  -Acute and chronic osteomyelitis.  -Gangrenous skin and soft tissue.    2. Right first metatarsal/clean margin:  -Minimal chronic osteomyelitis.    Verified by: Randall Rodriguez MD  (Electronic Signature)  Reported on: 06/27/22 12:18 EDT, [facility], [address] [place] [address]

## 2022-06-28 NOTE — PROGRESS NOTE ADULT - ASSESSMENT
71 year old female w/ pmhx of A.fib rate controlled not on AC for hx of multiple falls, T2DM, HTN, HLD, ESRD on HD, CHF, CAD s/p CABG status post R fem-BK pop w/PTFE on June 21st  S/P Partial ray amputation of R foot on June 23 now in MICU s/p RRT for bradycardia in setting of AVN blocker use.    1. Cardiogenic Shock   2. Bradycardia, Junctional Rhythm/AV willian disassociation  3. ESRD HD  4. R fem-BK pop w/PTFE pod 6                   5. Gangrene of R hallux toe s/p  amputation of toe pod 4  Sepsis POA due to right great hallux gangrene/infection  6. HFrEF  7. AFIB  8. DM2      Neuro:  - improved mental status  - Avoid Neuro Deliriogenic / sedative medications    CV:  - Dopamine gtt titrated off per MICU team  - metoprolol resumed due to tachycardia  - Hold CCB  - ECHO 4/22 EF 45% with moderate MR  - Continue DAPT  - Cardiology following    Pulm:  - Supplemental oxygen as needed to maintain spo2 > 94%  - f/up CXR - pulm edema                  ENDO  - lower insulin dose  - monitor for hypoglycemia    Renal:  - Even to net negative fluid balance as tolerated by hemodynamics and renal fx.    - Cr 3.2 Continue to monitor Bun/Cr  - Replacing electrolytes as needed with Goal K> 4, PO> 3, Mg> 2               - Strict I&O's  - Avoid Nephro toxic medication  - Renally dose meds  - HD TTS Schedule    Heme:  - Heparin for DVT prophylaxis    ID:  - WBC high  - Ceftriaxone  - trend CBC with diff, CMP  and fever curve  - Hx of L medial malleolus growing klebsiella oxytoca/raoutella oxytoca, E. coli, MSSA. Recent blood cultures negative.  - Bone culture growing MSSA  - abx as per ID      GOC  - DNR DNI     Multi-organ dz  d/w ICU team

## 2022-06-28 NOTE — PROGRESS NOTE ADULT - SUBJECTIVE AND OBJECTIVE BOX
[facility]  INFECTIOUS DISEASES   [address] [place] [address]  Tel: [phone]     Fax: [phone]  ======================================================  MD Zita Alejandra Kaushal, MD Cho, Michelle, MD   ======================================================    [id number]  SHILO KOHLER     Follow up: R foot wound/gangrene     Had fem/pop on 6/21, No fever, on 6/23 had amputation.   Awake and alert, doing well, no complaint. No pain in leg.     PAST MEDICAL & SURGICAL HISTORY:  Diabetes mellitus II  HTN (hypertension)  h/o Anxiety attack  Depression  h/o Myocardial infarct 2007  CAD (coronary artery disease)  h/o Hepatitis A 1969  currently resolved, no symptoms  PAD (peripheral artery disease)  Murmur, cardiac  h/o Smoking  quitted 3/2012  CRF (chronic renal failure), unspecified stage  Dialysis patient  Anemia secondary to renal failure  HTN (hypertension)  coronary stent 2007  s/p Ovarian cyst removal  s/p surgical removal of benign Skin lesion epigastric area    Social Hx: no current smoking, ETOH or drugs     FAMILY HISTORY:  No pertinent family history in first degree relatives    Allergies  latex (Unknown)  No Known Drug Allergies    Antibiotics:  zosyn     REVIEW OF SYSTEMS:  CONSTITUTIONAL:  No Fever or chills  HEENT:  No diplopia or blurred vision.  No sore throat or runny nose.  CARDIOVASCULAR:  No chest pain or SOB.  RESPIRATORY:  No cough, shortness of breath, PND or orthopnea.  GASTROINTESTINAL:  No nausea, vomiting or diarrhea.  GENITOURINARY:  No dysuria, frequency or urgency. No Blood in urine  MUSCULOSKELETAL:  No more pain in leg   SKIN:  R foot wound better  NEUROLOGIC:  No paresthesias, fasciculations, seizures or weakness.  PSYCHIATRIC:  No disorder of thought or mood.  ENDOCRINE:  No heat or cold intolerance, polyuria or polydipsia.  HEMATOLOGICAL:  No easy bruising or bleeding.     Physical Exam:  Vital Signs Last 24 Hrs  T(C): 36.9 (28 Jun 2022 14:44), Max: 36.9 (28 Jun 2022 14:44)  T(F): 98.4 (28 Jun 2022 14:44), Max: 98.4 (28 Jun 2022 14:44)  HR: 110 (28 Jun 2022 14:44) (67 - 119)  BP: 121/64 (28 Jun 2022 14:44) (108/55 - 178/98)  BP(mean): 87 (28 Jun 2022 14:00) (79 - 119)  RR: 16 (28 Jun 2022 14:44) (14 - 38)  SpO2: 98% (28 Jun 2022 14:44) (89% - 99%)  GEN: NAD  HEENT: normocephalic and atraumatic. EOMI. PERRL.    NECK: Supple.  No lymphadenopathy   LUNGS: Clear to auscultation.  HEART: Irregular rate and rhythm   ABDOMEN: Soft, nontender, and nondistended.  Positive bowel sounds.    : No CVA tenderness  EXTREMITIES: Now with 2 long wounds in medial side of leg after vascular surgery look clean  R foot dressed after amputation   NEUROLOGIC: grossly intact.  PSYCHIATRIC: Appropriate affect .  SKIN: No rash       Labs:                        8.2    16.04 )-----------( 308      ( 28 Jun 2022 08:20 )             26.2     06-28    135  |  95<L>  |  31<H>  ----------------------------<  47<LL>  3.4<L>   |  30  |  3.40<H>    Ca    9.7      28 Jun 2022 08:20  Phos  2.2     06-27  Mg     2.9     06-27    TPro  6.1  /  Alb  2.2<L>  /  TBili  0.4  /  DBili  x   /  AST  30  /  ALT  8<L>  /  AlkPhos  101  06-28    Culture - Tissue with Gram Stain (collected 06-23-22 @ 15:35)  Source: .Tissue Other, right hallux bone culture  Gram Stain (06-24-22 @ 04:37):    Rare polymorphonuclear leukocytes seen per low power field    Few Gram positive cocci in pairs seen per oil power field  Final Report (06-28-22 @ 13:56):    Few Staphylococcus aureus    Rare Enterococcus faecalis  Organism: Staphylococcus aureus  Enterococcus faecalis (06-28-22 @ 13:56)  Organism: Enterococcus faecalis (06-28-22 @ 13:56)    Sensitivities:      -  Ampicillin: S <=2 Predicts results to ampicillin/sulbactam, amoxacillin-clavulanate and  piperacillin-tazobactam.      -  Tetra/Doxy: R >8      -  Vancomycin: S 2      Method Type: SONIA  Organism: Staphylococcus aureus (06-28-22 @ 13:56)    Sensitivities:      -  Ampicillin/Sulbactam: S <=8/4      -  Cefazolin: S <=4      -  Clindamycin: R <=0.25 This isolate is presumed to be clindamycin resistant based on detection of inducible resistance. Clindamycin may still be effective in some patients.      -  Erythromycin: R >4      -  Gentamicin: S <=1 Should not be used as monotherapy      -  Oxacillin: S <=0.25 Oxacillin predicts susceptibility for dicloxacillin, methicillin, and nafcillin      -  Penicillin: R >8      -  Rifampin: S <=1 Should not be used as monotherapy      -  Tetra/Doxy: S <=1      -  Trimethoprim/Sulfamethoxazole: S <=0.5/9.5      -  Vancomycin: S 1      Method Type: SONIA    Culture - Urine (collected 06-16-22 @ 03:40)  Source: Clean Catch Clean Catch (Midstream)  Final Report (06-17-22 @ 07:32):    <10,000 CFU/mL Normal Urogenital Shantel    Culture - Blood (collected 06-16-22 @ 03:38)  Source: .Blood Blood-Peripheral  Final Report (06-21-22 @ 04:00):    No Growth Final    Culture - Blood (collected 06-16-22 @ 03:38)  Source: .Blood Blood-Peripheral  Final Report (06-21-22 @ 04:00):    No Growth Final    WBC Count: 16.04 K/uL (06-28-22 @ 08:20)  WBC Count: 17.10 K/uL (06-27-22 @ 11:40)  WBC Count: 19.17 K/uL (06-27-22 @ 06:58)  WBC Count: 14.03 K/uL (06-25-22 @ 06:05)  WBC Count: 14.26 K/uL (06-24-22 @ 08:07)    Creatinine, Serum: 3.40 mg/dL (06-28-22 @ 08:20)  Creatinine, Serum: 6.30 mg/dL (06-27-22 @ 11:40)  Creatinine, Serum: 6.20 mg/dL (06-27-22 @ 06:58)  Creatinine, Serum: 3.80 mg/dL (06-25-22 @ 06:05)  Creatinine, Serum: 5.10 mg/dL (06-24-22 @ 08:07)    C-Reactive Protein, Serum: 19 mg/L (06-17-22 @ 10:38)    Sedimentation Rate, Erythrocyte: 13 mm/hr (06-17-22 @ 07:52)     COVID-19 PCR: NotDetec (06-23-22 @ 11:42)  COVID-19 PCR: NotDetec (06-20-22 @ 08:14)  SARS-CoV-2: NotDetec (06-15-22 @ 22:44)    All imaging and other data have been reviewed.  < from: CT Chest No Cont (06.16.22 @ 08:16) >  IMPRESSION:  Small layering bilateral pleural effusions decreased from 4/6/2022.  7 mm groundglass nodule within left lower lobe (series 2 image 44).   Recommend follow-up chest CT in 12 months to determine stability.    Assessment and Plan:   72 yo woman with PMH of HTN, DM2, CAD, CHF, A.fib, multiple falls and ESRD on HD was admitted on 6/16, came to ED with generalized weakness.   Her hypoglycemia and hypothermia on admission could be related to sepsis/SIRS source unclear at this time, could be foot infection? Left hallux looks like  a dry gangrene with superimposed infection.   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 She had Tissue cultures from 3/30/22 grew   klebsiella oxytoca/raoutella oxytoca, E. coli, MSSA so Cefazolin was given after HD to complete the course of treatment.   ESR 13 and CRP 19  MRSA PCR negative   6/21 s/p Femoral popliteal bypass with prosthetic graft  6/23 s/p R 1st toe ray amputation   Wound culture from OR with MSSA and enterococcus fecalis   6/27 RRT for symptomatic bradycardia transferred to MICU, had high lactate and leukocytosis.   6/28 stable transferred to medical floor again.     Recommendations:  - Blood culture x 2 NGTD  - leukocytosis could be reactive stable 16k today  - Vascular surgery and podiatry follow ups noted   - Cultures sent from OR on 6/23 with MSSA and enterococcus fecalis   - Pathology showed OM in margines so need 6 weeks treatment from the start   - Switched ceftriaxone to Unasyn 3gm daily on 6/27  - Last day would be 7/26  - Weekly CBC, BMP and CRP    Will follow PRN.    Geovany Long MD  Division of Infectious Diseases   Please call ID service at [phone] with any question.      35 minutes spent on total encounter assessing patient, examination, chart reivew, counseling and coordinating care by the attending physician/nurse/care manager.

## 2022-06-28 NOTE — PROGRESS NOTE ADULT - SUBJECTIVE AND OBJECTIVE BOX
Patient is a 71y Female with a known history of :  SIRS (systemic inflammatory response syndrome) [R65.10]    Hypoglycemia [E16.2]    Pleural effusion [J90]    CHF, acute [I50.9]    Chronic CHF [I50.9]    Elevated troponin [R77.8]    Atrial fibrillation [I48.91]    Benign essential HTN [I10]    HLD (hyperlipidemia) [E78.5]    Depression, major [F32.9]    Need for prophylactic measure [Z29.9]    ESRD on hemodialysis [N18.6]    T2DM (type 2 diabetes mellitus) [E11.9]    HFrEF (heart failure with reduced ejection fraction) [I50.20]    Gangrene of toe of right foot [I96]    Atherosclerotic PVD with ulceration [I70.209]    PVD (peripheral vascular disease) [I73.9]      HPI:  Patient is a 71 year old female with PMH of A.fib rate controlled not on AC for hx of multiple falls, T2DM, HTN, HLD, ESRD on HD, CHF, s/p CABG brought in by EMS for generalized weakness at home. Patient states that she was doing well when in the afternoon she tried to get up from her couch and felt weak in the LE and fell back in her couch. Denies any hx of head trauma or loss of consciousness. Denies any weakness or numbness. Denies any chest pain, SOB or palpitations. No fever, cough or chills. No hx of recent travel or sick contacts. At the time of EMS arrival patient was found to have POCBS of 50. EMS gave dextrose and on arrival to the ED patient blood sugar was found to be in 30's with hypothermia of 94.9.    ED course:   Vitals:   BP: 176/85  HR:76  Temp:94.2  RR:18, O2:96% on RA   Significant Labs:   CBC: WBC:16.82 Hb:13.2 , Platlets: 260, Neutro:89   CMP: Lytes: WNL, BUN/Creatinine:48/4.8, Glucose:134 , Alkaline Phos:128, AST, 41, eGFR: 9, HsTrop: 275.9, ProBNP: 029995, Lactate: 2  UA; negative  CXR: Heart and lung appear normal (self read)  CT BRAIN: No acute intracranial bleeding. Central volume loss, chronic microvascular ischemic changes, and chronic bilateral lacunar infarctions.  CT CERVICAL SPINE: No fracture. Grade 1 C4-C5 anterolisthesis. C6-C7 disc degeneration. Bilateral pleural effusions and interlobular septal thickening due to   interstitial edema.  EKG: NSR, left axis deviation, HR 71,   QT/QTc: 426/462  Patient received: Ceftriaxone 1 g x1, Dextrose 5% + NS 1L x1, Dextrose 50% IV X1, heating blanket, 2L NC   (16 Jun 2022 01:19)      REVIEW OF SYSTEMS:    CONSTITUTIONAL: No fever, weight loss, or fatigue  EYES: No eye pain, visual disturbances, or discharge  ENMT:  No difficulty hearing, tinnitus, vertigo; No sinus or throat pain  NECK: No pain or stiffness  BREASTS: No pain, masses, or nipple discharge  RESPIRATORY: No cough, wheezing, chills or hemoptysis; No shortness of breath  CARDIOVASCULAR: No chest pain, palpitations, dizziness, or leg swelling  GASTROINTESTINAL: No abdominal or epigastric pain. No nausea, vomiting, or hematemesis; No diarrhea or constipation. No melena or hematochezia.  GENITOURINARY: No dysuria, frequency, hematuria, or incontinence  NEUROLOGICAL: No headaches, memory loss, loss of strength, numbness, or tremors  SKIN: No itching, burning, rashes, or lesions   LYMPH NODES: No enlarged glands  ENDOCRINE: No heat or cold intolerance; No hair loss  MUSCULOSKELETAL: No joint pain or swelling; No muscle, back, or extremity pain  PSYCHIATRIC: No depression, anxiety, mood swings, or difficulty sleeping  HEME/LYMPH: No easy bruising, or bleeding gums  ALLERGY AND IMMUNOLOGIC: No hives or eczema    MEDICATIONS  (STANDING):  ampicillin/sulbactam  IVPB 3 Gram(s) IV Intermittent every 24 hours  ampicillin/sulbactam  IVPB      aspirin enteric coated 81 milliGRAM(s) Oral daily  atorvastatin 40 milliGRAM(s) Oral at bedtime  calcium acetate 667 milliGRAM(s) Oral three times a day with meals  chlorhexidine 4% Liquid 1 Application(s) Topical <User Schedule>  cloNIDine 0.1 milliGRAM(s) Oral two times a day  clopidogrel Tablet 75 milliGRAM(s) Oral daily  dextrose 5%. 1000 milliLiter(s) (50 mL/Hr) IV Continuous <Continuous>  dextrose 50% Injectable 25 Gram(s) IV Push once  DOPamine Infusion 5 MICROgram(s)/kG/Min (10.1 mL/Hr) IV Continuous <Continuous>  epoetin fawad-epbx (RETACRIT) Injectable 37655 Unit(s) IV Push <User Schedule>  glucagon  Injectable 1 milliGRAM(s) IntraMuscular once  heparin   Injectable 5000 Unit(s) SubCutaneous every 12 hours  imipramine 50 milliGRAM(s) Oral daily  insulin glargine Injectable (LANTUS) 15 Unit(s) SubCutaneous at bedtime  insulin lispro (ADMELOG) corrective regimen sliding scale   SubCutaneous three times a day before meals  insulin lispro Injectable (ADMELOG) 3 Unit(s) SubCutaneous three times a day before meals  pantoprazole    Tablet 40 milliGRAM(s) Oral before breakfast    MEDICATIONS  (PRN):  acetaminophen     Tablet .. 650 milliGRAM(s) Oral every 6 hours PRN Temp greater or equal to 38C (100.4F), Mild Pain (1 - 3)  aluminum hydroxide/magnesium hydroxide/simethicone Suspension 30 milliLiter(s) Oral every 4 hours PRN Dyspepsia  dextrose Oral Gel 15 Gram(s) Oral once PRN Blood Glucose LESS THAN 70 milliGRAM(s)/deciliter  diphenhydrAMINE Injectable 25 milliGRAM(s) IV Push <User Schedule> PRN Combative behavior  melatonin 3 milliGRAM(s) Oral at bedtime PRN Insomnia      ALLERGIES: latex (Unknown)  No Known Drug Allergies      FAMILY HISTORY:  No pertinent family history in first degree relatives        PHYSICAL EXAMINATION:  -----------------------------  T(C): 36.4 (06-27-22 @ 21:00), Max: 36.4 (06-27-22 @ 16:35)  HR: 85 (06-28-22 @ 06:00) (39 - 90)  BP: 139/63 (06-28-22 @ 06:00) (81/40 - 178/98)  RR: 16 (06-28-22 @ 06:00) (12 - 38)  SpO2: 95% (06-28-22 @ 06:00) (89% - 99%)  Wt(kg): --    06-27 @ 07:01  -  06-28 @ 07:00  --------------------------------------------------------  IN:    DOPamine Infusion: 74.6 mL    IV PiggyBack: 50 mL    Lactated Ringers Bolus: 500 mL  Total IN: 624.6 mL    OUT:    Other (mL): 1500 mL    Voided (mL): 0 mL  Total OUT: 1500 mL    Total NET: -875.4 mL          Weight (kg): 55.3 (06-27 @ 10:23)    VITALS  T(C): 36.4 (06-27-22 @ 21:00), Max: 36.4 (06-27-22 @ 16:35)  HR: 85 (06-28-22 @ 06:00) (39 - 90)  BP: 139/63 (06-28-22 @ 06:00) (81/40 - 178/98)  RR: 16 (06-28-22 @ 06:00) (12 - 38)  SpO2: 95% (06-28-22 @ 06:00) (89% - 99%)    Constitutional: well developed, normal appearance, well groomed, well nourished, no deformities and no acute distress.   Eyes: the conjunctiva exhibited no abnormalities and the eyelids demonstrated no xanthelasmas.   HEENT: normal oral mucosa, no oral pallor and no oral cyanosis.   Neck: normal jugular venous A waves present, normal jugular venous V waves present and no jugular venous wilson A waves.   Pulmonary: no respiratory distress, normal respiratory rhythm and effort, no accessory muscle use and lungs were clear to auscultation bilaterally.   Cardiovascular: heart rate and rhythm were normal, normal S1 and S2 and no murmur, gallop, rub, heave or thrill are present.   Abdomen: soft, non-tender, no hepato-splenomegaly and no abdominal mass palpated.   Musculoskeletal: the gait could not be assessed..   Extremities: no clubbing of the fingernails, no localized cyanosis, no petechial hemorrhages and no ischemic changes.   Skin: normal skin color and pigmentation, no rash, no venous stasis, no skin lesions, no skin ulcer and no xanthoma was observed.   Psychiatric: oriented to person, place, and time, the affect was normal, the mood was normal and not feeling anxious.     LABS:   --------  06-27    129<L>  |  92<L>  |  73<H>  ----------------------------<  264<H>  4.5   |  27  |  6.30<H>    Ca    12.3<H>      27 Jun 2022 11:40  Phos  2.2     06-27  Mg     2.9     06-27    TPro  6.5  /  Alb  2.3<L>  /  TBili  0.6  /  DBili  x   /  AST  32  /  ALT  10<L>  /  AlkPhos  105  06-27                         8.7    17.10 )-----------( 318      ( 27 Jun 2022 11:40 )             27.6                 RADIOLOGY:  -----------------    ECG:     ECHO:

## 2022-06-28 NOTE — PROGRESS NOTE ADULT - SUBJECTIVE AND OBJECTIVE BOX
Patient is a 71y old  Female who presents with a chief complaint of SIRS (28 Jun 2022 08:16)  S/P R fem-BK pop w/PTFE                     POD#  7  S/P Partial ray amputation of R foot       POD # 5  RRT yesterday for symptomatic bradycardia and transferred to ICU  No acute events overnight; no complaints this am    MEDICATIONS  (STANDING):  ampicillin/sulbactam  IVPB 3 Gram(s) IV Intermittent every 24 hours  ampicillin/sulbactam  IVPB      aspirin enteric coated 81 milliGRAM(s) Oral daily  atorvastatin 40 milliGRAM(s) Oral at bedtime  calcium acetate 667 milliGRAM(s) Oral three times a day with meals  chlorhexidine 4% Liquid 1 Application(s) Topical <User Schedule>  cloNIDine 0.1 milliGRAM(s) Oral two times a day  clopidogrel Tablet 75 milliGRAM(s) Oral daily  dextrose 5%. 1000 milliLiter(s) (50 mL/Hr) IV Continuous <Continuous>  dextrose 50% Injectable 25 Gram(s) IV Push once  DOPamine Infusion 5 MICROgram(s)/kG/Min (10.1 mL/Hr) IV Continuous <Continuous>  epoetin fawad-epbx (RETACRIT) Injectable 63325 Unit(s) IV Push <User Schedule>  glucagon  Injectable 1 milliGRAM(s) IntraMuscular once  heparin   Injectable 5000 Unit(s) SubCutaneous every 12 hours  imipramine 50 milliGRAM(s) Oral daily  insulin glargine Injectable (LANTUS) 15 Unit(s) SubCutaneous at bedtime  insulin lispro (ADMELOG) corrective regimen sliding scale   SubCutaneous three times a day before meals  insulin lispro Injectable (ADMELOG) 3 Unit(s) SubCutaneous three times a day before meals  pantoprazole    Tablet 40 milliGRAM(s) Oral before breakfast    MEDICATIONS  (PRN):  acetaminophen     Tablet .. 650 milliGRAM(s) Oral every 6 hours PRN Temp greater or equal to 38C (100.4F), Mild Pain (1 - 3)  aluminum hydroxide/magnesium hydroxide/simethicone Suspension 30 milliLiter(s) Oral every 4 hours PRN Dyspepsia  dextrose Oral Gel 15 Gram(s) Oral once PRN Blood Glucose LESS THAN 70 milliGRAM(s)/deciliter  diphenhydrAMINE Injectable 25 milliGRAM(s) IV Push <User Schedule> PRN Combative behavior  melatonin 3 milliGRAM(s) Oral at bedtime PRN Insomnia    Allergies    latex (Unknown)  No Known Drug Allergies    Intolerances    Vital Signs Last 24 Hrs  T(C): 36.5 (28 Jun 2022 07:53), Max: 36.5 (28 Jun 2022 07:53)  T(F): 97.7 (28 Jun 2022 07:53), Max: 97.7 (28 Jun 2022 07:53)  HR: 102 (28 Jun 2022 07:00) (39 - 102)  BP: 108/55 (28 Jun 2022 07:00) (81/40 - 178/98)  BP(mean): 79 (28 Jun 2022 07:00) (57 - 119)  RR: 15 (28 Jun 2022 07:00) (12 - 38)  SpO2: 93% (28 Jun 2022 07:00) (89% - 99%)  I&O's Detail    27 Jun 2022 07:01  -  28 Jun 2022 07:00  --------------------------------------------------------  IN:    DOPamine Infusion: 74.6 mL    IV PiggyBack: 50 mL    Lactated Ringers Bolus: 500 mL  Total IN: 624.6 mL    OUT:    Other (mL): 1500 mL    Voided (mL): 0 mL  Total OUT: 1500 mL    Total NET: -875.4 mL          Physical Exam:  General: Pale and lethargic but arousable  Pulmonary: Nonlabored breathing, no respiratory distress, diminished BS R base  Cardiovascular: irreg S1, S2  Abdominal: soft, NT/ND  Extremities: RLE WWP with ecchymotic R groin ; Suture line without erythema or drainage. R foot dressing CDI  Pulses:   Right:                                                                          DP [ x]2+ [ ]1+ [ ]doppler  PT[ ]2+ [ ]1+ [x ]doppler        LABS:                        8.7    17.10 )-----------( 318      ( 27 Jun 2022 11:40 )             27.6     06-27    129<L>  |  92<L>  |  73<H>  ----------------------------<  264<H>  4.5   |  27  |  6.30<H>    Ca    12.3<H>      27 Jun 2022 11:40  Phos  2.2     06-27  Mg     2.9     06-27    TPro  6.5  /  Alb  2.3<L>  /  TBili  0.6  /  DBili  x   /  AST  32  /  ALT  10<L>  /  AlkPhos  105  06-27      CAPILLARY BLOOD GLUCOSE  POCT Blood Glucose.: 47 mg/dL (28 Jun 2022 08:36)  POCT Blood Glucose.: 44 mg/dL (28 Jun 2022 08:34)  POCT Blood Glucose.: 112 mg/dL (27 Jun 2022 22:01)  POCT Blood Glucose.: 212 mg/dL (27 Jun 2022 17:11)  POCT Blood Glucose.: 254 mg/dL (27 Jun 2022 11:46)  POCT Blood Glucose.: 257 mg/dL (27 Jun 2022 09:57)    Radiology and Additional Studies:

## 2022-06-28 NOTE — PROGRESS NOTE ADULT - ASSESSMENT
Patient is a 71 year old female with PMH of A.fib rate controlled not on AC for hx of multiple falls, T2DM, HTN, HLD, ESRD on HD, CHF, s/p CABG brought in by EMS for generalized weakness at home. Patient states that she was doing well when in the afternoon she tried to get up from her couch and felt weak in the LE and fell back in her couch. Denies any hx of head trauma or loss of consciousness. Denies any weakness or numbness. Denies any chest pain, SOB or palpitations. No fever, cough or chills. No hx of recent travel or sick contacts. At the time of EMS arrival patient was found to have POCBS of 50. EMS gave dextrose and on arrival to the ED patient blood sugar was found to be in 30's with hypothermia of 94.9.    ED course:   Vitals:   BP: 176/85  HR:76  Temp:94.2  RR:18, O2:96% on RA   Significant Labs:   CBC: WBC:16.82 Hb:13.2 , Platlets: 260, Neutro:89   CMP: Lytes: WNL, BUN/Creatinine:48/4.8, Glucose:134 , Alkaline Phos:128, AST, 41, eGFR: 9, HsTrop: 275.9, ProBNP: 488350, Lactate: 2  UA; negative  CXR: Heart and lung appear normal (self read)  CT BRAIN: No acute intracranial bleeding. Central volume loss, chronic microvascular ischemic changes, and chronic bilateral lacunar infarctions.  CT CERVICAL SPINE: No fracture. Grade 1 C4-C5 anterolisthesis. C6-C7 disc degeneration. Bilateral pleural effusions and interlobular septal thickening due to   interstitial edema.  EKG: NSR, left axis deviation, HR 71,   QT/QTc: 426/462  Patient received: Ceftriaxone 1 g x1, Dextrose 5% + NS 1L x1, Dextrose 50% IV X1, heating blanket, 2L NC   (16 Jun 2022 01:19)      rrt called transfer to micu     esrd on hd plan for hd as order        ANEMIA PLAN:  Anemia of chronic disease:  We will continue epo  aiming for a HCT of 32-36 %.   We will monitor Iron stores, B12 and RBC folate .    BP monitoring,continue current antihypertensive meds, low salt diet  metoprolol tartrate 100 milliGRAM(s) Oral every 12 hours  cloNIDine 0.1 milliGRAM(s) Oral two times a day    f/u  blood and urine cx,serial lactate levels,monitor vitals closley,ivfs hydration,monitor urine output and renal profile,iv abx  cefTRIAXone   IVPB 2000 milliGRAM(s) IV Intermittent every 24 hours      dvt heparin   Injectable 5000 Unit(s) SubCutaneous every 12 hours

## 2022-06-28 NOTE — PROVIDER CONTACT NOTE (HYPOGLYCEMIA EVENT) - NS PROVIDER CONTACT BACKGROUND-HYPO
Age: 71y    Gender: Female    POCT Blood Glucose:  47 mg/dL (06-28-22 @ 08:36)  44 mg/dL (06-28-22 @ 08:34)  112 mg/dL (06-27-22 @ 22:01)  212 mg/dL (06-27-22 @ 17:11)  254 mg/dL (06-27-22 @ 11:46)  257 mg/dL (06-27-22 @ 09:57)      eMAR:  atorvastatin   40 milliGRAM(s) Oral (06-27-22 @ 21:16)    glucagon  Injectable   3 milliGRAM(s) IV Push (06-27-22 @ 12:03)    insulin glargine Injectable (LANTUS)   15 Unit(s) SubCutaneous (06-27-22 @ 22:23)    insulin lispro (ADMELOG) corrective regimen sliding scale   2 Unit(s) SubCutaneous (06-27-22 @ 17:17)   3 Unit(s) SubCutaneous (06-27-22 @ 12:13)

## 2022-06-29 LAB
ALBUMIN SERPL ELPH-MCNC: 2.4 G/DL — LOW (ref 3.3–5)
ALP SERPL-CCNC: 121 U/L — HIGH (ref 40–120)
ALT FLD-CCNC: 11 U/L — LOW (ref 12–78)
ANION GAP SERPL CALC-SCNC: 11 MMOL/L — SIGNIFICANT CHANGE UP (ref 5–17)
AST SERPL-CCNC: 26 U/L — SIGNIFICANT CHANGE UP (ref 15–37)
BASOPHILS # BLD AUTO: 0.05 K/UL — SIGNIFICANT CHANGE UP (ref 0–0.2)
BASOPHILS NFR BLD AUTO: 0.4 % — SIGNIFICANT CHANGE UP (ref 0–2)
BILIRUB SERPL-MCNC: 0.5 MG/DL — SIGNIFICANT CHANGE UP (ref 0.2–1.2)
BUN SERPL-MCNC: 50 MG/DL — HIGH (ref 7–23)
CALCIUM SERPL-MCNC: 9.6 MG/DL — SIGNIFICANT CHANGE UP (ref 8.5–10.1)
CHLORIDE SERPL-SCNC: 92 MMOL/L — LOW (ref 96–108)
CO2 SERPL-SCNC: 31 MMOL/L — SIGNIFICANT CHANGE UP (ref 22–31)
CREAT SERPL-MCNC: 4.8 MG/DL — HIGH (ref 0.5–1.3)
EGFR: 9 ML/MIN/1.73M2 — LOW
EOSINOPHIL # BLD AUTO: 0.17 K/UL — SIGNIFICANT CHANGE UP (ref 0–0.5)
EOSINOPHIL NFR BLD AUTO: 1.3 % — SIGNIFICANT CHANGE UP (ref 0–6)
GLUCOSE SERPL-MCNC: 224 MG/DL — HIGH (ref 70–99)
HCT VFR BLD CALC: 28.8 % — LOW (ref 34.5–45)
HGB BLD-MCNC: 8.9 G/DL — LOW (ref 11.5–15.5)
IMM GRANULOCYTES NFR BLD AUTO: 1.9 % — HIGH (ref 0–1.5)
LACTATE SERPL-SCNC: 1.7 MMOL/L — SIGNIFICANT CHANGE UP (ref 0.7–2)
LYMPHOCYTES # BLD AUTO: 1.15 K/UL — SIGNIFICANT CHANGE UP (ref 1–3.3)
LYMPHOCYTES # BLD AUTO: 8.5 % — LOW (ref 13–44)
MAGNESIUM SERPL-MCNC: 2.6 MG/DL — SIGNIFICANT CHANGE UP (ref 1.6–2.6)
MCHC RBC-ENTMCNC: 28.6 PG — SIGNIFICANT CHANGE UP (ref 27–34)
MCHC RBC-ENTMCNC: 30.9 GM/DL — LOW (ref 32–36)
MCV RBC AUTO: 92.6 FL — SIGNIFICANT CHANGE UP (ref 80–100)
MONOCYTES # BLD AUTO: 1.21 K/UL — HIGH (ref 0–0.9)
MONOCYTES NFR BLD AUTO: 8.9 % — SIGNIFICANT CHANGE UP (ref 2–14)
NEUTROPHILS # BLD AUTO: 10.74 K/UL — HIGH (ref 1.8–7.4)
NEUTROPHILS NFR BLD AUTO: 79 % — HIGH (ref 43–77)
NRBC # BLD: 0 /100 WBCS — SIGNIFICANT CHANGE UP (ref 0–0)
PHOSPHATE SERPL-MCNC: 2.3 MG/DL — LOW (ref 2.5–4.5)
PLATELET # BLD AUTO: 378 K/UL — SIGNIFICANT CHANGE UP (ref 150–400)
POTASSIUM SERPL-MCNC: 4.1 MMOL/L — SIGNIFICANT CHANGE UP (ref 3.5–5.3)
POTASSIUM SERPL-SCNC: 4.1 MMOL/L — SIGNIFICANT CHANGE UP (ref 3.5–5.3)
PROCALCITONIN SERPL-MCNC: 41.77 NG/ML — HIGH
PROT SERPL-MCNC: 7 G/DL — SIGNIFICANT CHANGE UP (ref 6–8.3)
RBC # BLD: 3.11 M/UL — LOW (ref 3.8–5.2)
RBC # FLD: 17.7 % — HIGH (ref 10.3–14.5)
SODIUM SERPL-SCNC: 134 MMOL/L — LOW (ref 135–145)
WBC # BLD: 13.58 K/UL — HIGH (ref 3.8–10.5)
WBC # FLD AUTO: 13.58 K/UL — HIGH (ref 3.8–10.5)

## 2022-06-29 PROCEDURE — 99232 SBSQ HOSP IP/OBS MODERATE 35: CPT

## 2022-06-29 PROCEDURE — 93010 ELECTROCARDIOGRAM REPORT: CPT

## 2022-06-29 PROCEDURE — 99233 SBSQ HOSP IP/OBS HIGH 50: CPT | Mod: GC

## 2022-06-29 RX ORDER — DILTIAZEM HCL 120 MG
15 CAPSULE, EXT RELEASE 24 HR ORAL
Qty: 125 | Refills: 0 | Status: DISCONTINUED | OUTPATIENT
Start: 2022-06-29 | End: 2022-06-29

## 2022-06-29 RX ORDER — DILTIAZEM HCL 120 MG
5 CAPSULE, EXT RELEASE 24 HR ORAL ONCE
Refills: 0 | Status: COMPLETED | OUTPATIENT
Start: 2022-06-29 | End: 2022-06-29

## 2022-06-29 RX ORDER — DILTIAZEM HCL 120 MG
5 CAPSULE, EXT RELEASE 24 HR ORAL
Qty: 125 | Refills: 0 | Status: DISCONTINUED | OUTPATIENT
Start: 2022-06-29 | End: 2022-06-29

## 2022-06-29 RX ORDER — METOPROLOL TARTRATE 50 MG
5 TABLET ORAL ONCE
Refills: 0 | Status: COMPLETED | OUTPATIENT
Start: 2022-06-29 | End: 2022-06-29

## 2022-06-29 RX ORDER — DILTIAZEM HCL 120 MG
10 CAPSULE, EXT RELEASE 24 HR ORAL
Qty: 125 | Refills: 0 | Status: DISCONTINUED | OUTPATIENT
Start: 2022-06-29 | End: 2022-06-29

## 2022-06-29 RX ORDER — INSULIN LISPRO 100/ML
2 VIAL (ML) SUBCUTANEOUS ONCE
Refills: 0 | Status: COMPLETED | OUTPATIENT
Start: 2022-06-29 | End: 2022-06-29

## 2022-06-29 RX ORDER — OLANZAPINE 15 MG/1
2.5 TABLET, FILM COATED ORAL ONCE
Refills: 0 | Status: COMPLETED | OUTPATIENT
Start: 2022-06-29 | End: 2022-06-29

## 2022-06-29 RX ORDER — INSULIN LISPRO 100/ML
VIAL (ML) SUBCUTANEOUS AT BEDTIME
Refills: 0 | Status: DISCONTINUED | OUTPATIENT
Start: 2022-06-29 | End: 2022-07-07

## 2022-06-29 RX ORDER — DILTIAZEM HCL 120 MG
15 CAPSULE, EXT RELEASE 24 HR ORAL
Qty: 125 | Refills: 0 | Status: DISCONTINUED | OUTPATIENT
Start: 2022-06-29 | End: 2022-06-30

## 2022-06-29 RX ORDER — METOPROLOL TARTRATE 50 MG
50 TABLET ORAL EVERY 8 HOURS
Refills: 0 | Status: DISCONTINUED | OUTPATIENT
Start: 2022-06-29 | End: 2022-06-30

## 2022-06-29 RX ADMIN — INSULIN GLARGINE 7 UNIT(S): 100 INJECTION, SOLUTION SUBCUTANEOUS at 21:58

## 2022-06-29 RX ADMIN — HEPARIN SODIUM 5000 UNIT(S): 5000 INJECTION INTRAVENOUS; SUBCUTANEOUS at 05:43

## 2022-06-29 RX ADMIN — PANTOPRAZOLE SODIUM 40 MILLIGRAM(S): 20 TABLET, DELAYED RELEASE ORAL at 05:43

## 2022-06-29 RX ADMIN — CHLORHEXIDINE GLUCONATE 1 APPLICATION(S): 213 SOLUTION TOPICAL at 13:54

## 2022-06-29 RX ADMIN — Medication 10 MG/HR: at 17:54

## 2022-06-29 RX ADMIN — Medication 3: at 09:02

## 2022-06-29 RX ADMIN — Medication 1: at 14:00

## 2022-06-29 RX ADMIN — Medication 0.1 MILLIGRAM(S): at 05:43

## 2022-06-29 RX ADMIN — Medication 5 MG/HR: at 17:18

## 2022-06-29 RX ADMIN — HEPARIN SODIUM 5000 UNIT(S): 5000 INJECTION INTRAVENOUS; SUBCUTANEOUS at 17:20

## 2022-06-29 RX ADMIN — Medication 650 MILLIGRAM(S): at 10:08

## 2022-06-29 RX ADMIN — CLOPIDOGREL BISULFATE 75 MILLIGRAM(S): 75 TABLET, FILM COATED ORAL at 13:54

## 2022-06-29 RX ADMIN — OLANZAPINE 2.5 MILLIGRAM(S): 15 TABLET, FILM COATED ORAL at 18:50

## 2022-06-29 RX ADMIN — Medication 3 MILLIGRAM(S): at 21:58

## 2022-06-29 RX ADMIN — Medication 50 MILLIGRAM(S): at 05:43

## 2022-06-29 RX ADMIN — Medication 0.1 MILLIGRAM(S): at 17:19

## 2022-06-29 RX ADMIN — Medication 650 MILLIGRAM(S): at 10:50

## 2022-06-29 RX ADMIN — Medication 5 MILLIGRAM(S): at 12:32

## 2022-06-29 RX ADMIN — Medication 5 MILLIGRAM(S): at 14:12

## 2022-06-29 RX ADMIN — AMPICILLIN SODIUM AND SULBACTAM SODIUM 200 GRAM(S): 250; 125 INJECTION, POWDER, FOR SUSPENSION INTRAMUSCULAR; INTRAVENOUS at 23:27

## 2022-06-29 RX ADMIN — Medication 50 MILLIGRAM(S): at 21:58

## 2022-06-29 RX ADMIN — Medication 2: at 17:35

## 2022-06-29 RX ADMIN — Medication 15 MG/HR: at 19:33

## 2022-06-29 RX ADMIN — Medication 81 MILLIGRAM(S): at 13:54

## 2022-06-29 RX ADMIN — ATORVASTATIN CALCIUM 40 MILLIGRAM(S): 80 TABLET, FILM COATED ORAL at 21:58

## 2022-06-29 RX ADMIN — Medication 50 MILLIGRAM(S): at 13:54

## 2022-06-29 NOTE — PROGRESS NOTE ADULT - SUBJECTIVE AND OBJECTIVE BOX
[facility]  INFECTIOUS DISEASES   [address] [place] [address]  Tel: [phone]     Fax: [phone]  ======================================================  MD Zita Alejandra Kaushal, MD Cho, Michelle, MD   ======================================================    MRN-[id number]  SHILO KOHLER     Follow up: R foot wound/gangrene     Awake and alert, doing well, no complaint. No pain in leg.   Wound looks clean, healing no sign of ischemia.   Had a low grade fever 100.4.   Seen while in HD.     PAST MEDICAL & SURGICAL HISTORY:  Diabetes mellitus II  HTN (hypertension)  h/o Anxiety attack  Depression  h/o Myocardial infarct 2007  CAD (coronary artery disease)  h/o Hepatitis A 1969  currently resolved, no symptoms  PAD (peripheral artery disease)  Murmur, cardiac  h/o Smoking  quitted 3/2012  CRF (chronic renal failure), unspecified stage  Dialysis patient  Anemia secondary to renal failure  HTN (hypertension)  coronary stent 2007  s/p Ovarian cyst removal  s/p surgical removal of benign Skin lesion epigastric area    Social Hx: no current smoking, ETOH or drugs     FAMILY HISTORY:  No pertinent family history in first degree relatives    Allergies  latex (Unknown)  No Known Drug Allergies    Antibiotics:  zosyn     REVIEW OF SYSTEMS:  CONSTITUTIONAL:  No Fever or chills  HEENT:  No diplopia or blurred vision.  No sore throat or runny nose.  CARDIOVASCULAR:  No chest pain or SOB.  RESPIRATORY:  No cough, shortness of breath, PND or orthopnea.  GASTROINTESTINAL:  No nausea, vomiting or diarrhea.  GENITOURINARY:  No dysuria, frequency or urgency. No Blood in urine  MUSCULOSKELETAL:  No more pain in leg   SKIN:  R foot wound better  NEUROLOGIC:  No paresthesias, fasciculations, seizures or weakness.  PSYCHIATRIC:  No disorder of thought or mood.  ENDOCRINE:  No heat or cold intolerance, polyuria or polydipsia.  HEMATOLOGICAL:  No easy bruising or bleeding.     Physical Exam:  Vital Signs Last 24 Hrs  T(C): 36.8 (29 Jun 2022 09:40), Max: 38 (28 Jun 2022 21:17)  T(F): 98.2 (29 Jun 2022 09:40), Max: 100.4 (28 Jun 2022 21:17)  HR: 118 (29 Jun 2022 09:40) (102 - 119)  BP: 129/68 (29 Jun 2022 09:40) (111/59 - 150/73)  BP(mean): 87 (28 Jun 2022 14:05) (87 - 95)  RR: 18 (29 Jun 2022 09:40) (15 - 18)  SpO2: 98% (29 Jun 2022 04:48) (90% - 98%)  GEN: NAD  HEENT: normocephalic and atraumatic. EOMI. PERRL.    NECK: Supple.  No lymphadenopathy   LUNGS: Clear to auscultation.  HEART: Irregular rate and rhythm   ABDOMEN: Soft, nontender, and nondistended.  Positive bowel sounds.    : No CVA tenderness  EXTREMITIES: Now with 2 long wounds in medial side of leg after vascular surgery look clean  R foot dressed after amputation   NEUROLOGIC: grossly intact.  PSYCHIATRIC: Appropriate affect .  SKIN: No rash     Labs:                        8.9    13.58 )-----------( 378      ( 29 Jun 2022 07:15 )             28.8     06-29    134<L>  |  92<L>  |  50<H>  ----------------------------<  224<H>  4.1   |  31  |  4.80<H>    Ca    9.6      29 Jun 2022 07:15  Phos  2.3     06-29  Mg     2.6     06-29    TPro  7.0  /  Alb  2.4<L>  /  TBili  0.5  /  DBili  x   /  AST  26  /  ALT  11<L>  /  AlkPhos  121<H>  06-29    Culture - Blood (collected 06-27-22 @ 11:45)  Source: .Blood Blood-Peripheral    Culture - Blood (collected 06-27-22 @ 11:40)  Source: .Blood Blood-Peripheral    Culture - Tissue with Gram Stain (collected 06-23-22 @ 15:35)  Source: .Tissue Other, right hallux bone culture  Gram Stain (06-24-22 @ 04:37):    Rare polymorphonuclear leukocytes seen per low power field    Few Gram positive cocci in pairs seen per oil power field  Final Report (06-28-22 @ 13:56):    Few Staphylococcus aureus    Rare Enterococcus faecalis  Organism: Staphylococcus aureus  Enterococcus faecalis (06-28-22 @ 13:56)  Organism: Enterococcus faecalis (06-28-22 @ 13:56)    Sensitivities:      -  Ampicillin: S <=2 Predicts results to ampicillin/sulbactam, amoxacillin-clavulanate and  piperacillin-tazobactam.      -  Tetra/Doxy: R >8      -  Vancomycin: S 2      Method Type: SONIA  Organism: Staphylococcus aureus (06-28-22 @ 13:56)    Sensitivities:      -  Ampicillin/Sulbactam: S <=8/4      -  Cefazolin: S <=4      -  Clindamycin: R <=0.25 This isolate is presumed to be clindamycin resistant based on detection of inducible resistance. Clindamycin may still be effective in some patients.      -  Erythromycin: R >4      -  Gentamicin: S <=1 Should not be used as monotherapy      -  Oxacillin: S <=0.25 Oxacillin predicts susceptibility for dicloxacillin, methicillin, and nafcillin      -  Penicillin: R >8      -  Rifampin: S <=1 Should not be used as monotherapy      -  Tetra/Doxy: S <=1      -  Trimethoprim/Sulfamethoxazole: S <=0.5/9.5      -  Vancomycin: S 1      Method Type: SONIA    Culture - Urine (collected 06-16-22 @ 03:40)  Source: Clean Catch Clean Catch (Midstream)  Final Report (06-17-22 @ 07:32):    <10,000 CFU/mL Normal Urogenital Shantel    Culture - Blood (collected 06-16-22 @ 03:38)  Source: .Blood Blood-Peripheral  Final Report (06-21-22 @ 04:00):    No Growth Final    Culture - Blood (collected 06-16-22 @ 03:38)  Source: .Blood Blood-Peripheral  Final Report (06-21-22 @ 04:00):    No Growth Final    WBC Count: 13.58 K/uL (06-29-22 @ 07:15)  WBC Count: 16.04 K/uL (06-28-22 @ 08:20)  WBC Count: 17.10 K/uL (06-27-22 @ 11:40)  WBC Count: 19.17 K/uL (06-27-22 @ 06:58)  WBC Count: 14.03 K/uL (06-25-22 @ 06:05)    Creatinine, Serum: 4.80 mg/dL (06-29-22 @ 07:15)  Creatinine, Serum: 3.40 mg/dL (06-28-22 @ 08:20)  Creatinine, Serum: 6.30 mg/dL (06-27-22 @ 11:40)  Creatinine, Serum: 6.20 mg/dL (06-27-22 @ 06:58)  Creatinine, Serum: 3.80 mg/dL (06-25-22 @ 06:05)    C-Reactive Protein, Serum: 19 mg/L (06-17-22 @ 10:38)    Sedimentation Rate, Erythrocyte: 13 mm/hr (06-17-22 @ 07:52)    Procalcitonin, Serum: 41.77 ng/mL (06-28-22 @ 23:53)     COVID-19 PCR: NotDetec (06-28-22 @ 14:00)  COVID-19 PCR: NotDetec (06-23-22 @ 11:42)  COVID-19 PCR: NotDetec (06-20-22 @ 08:14)  SARS-CoV-2: NotDetec (06-15-22 @ 22:44)    All imaging and other data have been reviewed.  < from: CT Chest No Cont (06.16.22 @ 08:16) >  IMPRESSION:  Small layering bilateral pleural effusions decreased from 4/6/2022.  7 mm groundglass nodule within left lower lobe (series 2 image 44).   Recommend follow-up chest CT in 12 months to determine stability.    Assessment and Plan:   72 yo woman with PMH of HTN, DM2, CAD, CHF, A.fib, multiple falls and ESRD on HD was admitted on 6/16, came to ED with generalized weakness.   Her hypoglycemia and hypothermia on admission could be related to sepsis/SIRS source unclear at this time, could be foot infection? Left hallux looks like  a dry gangrene with superimposed infection.   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 She had Tissue cultures from 3/30/22 grew   klebsiella oxytoca/raoutella oxytoca, E. coli, MSSA so Cefazolin was given after HD to complete the course of treatment.   ESR 13 and CRP 19  MRSA PCR negative   6/21 s/p Femoral popliteal bypass with prosthetic graft  6/23 s/p R 1st toe ray amputation   Wound culture from OR with MSSA and enterococcus fecalis   6/27 RRT for symptomatic bradycardia transferred to MICU, had high lactate and leukocytosis.   6/28 stable transferred to medical floor again.     Recommendations:  - Blood culture x 2 NGTD  - leukocytosis could be reactive stable 16k today  - Vascular surgery and podiatry follow ups noted   - Cultures sent from OR on 6/23 with MSSA and enterococcus fecalis   - Pathology showed OM in margines so need 6 weeks treatment from the start   - Continue Unasyn 3gm daily on 6/27  - Last day would be 7/26  - Weekly CBC, BMP and CRP  - If fever more than 100.8 send cultures     Will follow PRN.    Geovany Long MD  Division of Infectious Diseases   Please call ID service at [phone] with any question.      35 minutes spent on total encounter assessing patient, examination, chart reivew, counseling and coordinating care by the attending physician/nurse/care manager.

## 2022-06-29 NOTE — PROGRESS NOTE ADULT - SUBJECTIVE AND OBJECTIVE BOX
Patient is a 71y Female with a known history of :  SIRS (systemic inflammatory response syndrome) [R65.10]    Hypoglycemia [E16.2]    Pleural effusion [J90]    CHF, acute [I50.9]    Chronic CHF [I50.9]    Elevated troponin [R77.8]    Atrial fibrillation [I48.91]    Benign essential HTN [I10]    HLD (hyperlipidemia) [E78.5]    Depression, major [F32.9]    Need for prophylactic measure [Z29.9]    ESRD on hemodialysis [N18.6]    T2DM (type 2 diabetes mellitus) [E11.9]    HFrEF (heart failure with reduced ejection fraction) [I50.20]    Gangrene of toe of right foot [I96]    Atherosclerotic PVD with ulceration [I70.209]    PVD (peripheral vascular disease) [I73.9]      HPI:  Patient is a 71 year old female with PMH of A.fib rate controlled not on AC for hx of multiple falls, T2DM, HTN, HLD, ESRD on HD, CHF, s/p CABG brought in by EMS for generalized weakness at home. Patient states that she was doing well when in the afternoon she tried to get up from her couch and felt weak in the LE and fell back in her couch. Denies any hx of head trauma or loss of consciousness. Denies any weakness or numbness. Denies any chest pain, SOB or palpitations. No fever, cough or chills. No hx of recent travel or sick contacts. At the time of EMS arrival patient was found to have POCBS of 50. EMS gave dextrose and on arrival to the ED patient blood sugar was found to be in 30's with hypothermia of 94.9.    ED course:   Vitals:   BP: 176/85  HR:76  Temp:94.2  RR:18, O2:96% on RA   Significant Labs:   CBC: WBC:16.82 Hb:13.2 , Platlets: 260, Neutro:89   CMP: Lytes: WNL, BUN/Creatinine:48/4.8, Glucose:134 , Alkaline Phos:128, AST, 41, eGFR: 9, HsTrop: 275.9, ProBNP: [id number], Lactate: 2  UA; negative  CXR: Heart and lung appear normal (self read)  CT BRAIN: No acute intracranial bleeding. Central volume loss, chronic microvascular ischemic changes, and chronic bilateral lacunar infarctions.  CT CERVICAL SPINE: No fracture. Grade 1 C4-C5 anterolisthesis. C6-C7 disc degeneration. Bilateral pleural effusions and interlobular septal thickening due to   interstitial edema.  EKG: NSR, left axis deviation, HR 71,   QT/QTc: 426/462  Patient received: Ceftriaxone 1 g x1, Dextrose 5% + NS 1L x1, Dextrose 50% IV X1, heating blanket, 2L NC   (16 Jun 2022 01:19)      REVIEW OF SYSTEMS:    CONSTITUTIONAL: No fever, weight loss, or fatigue  EYES: No eye pain, visual disturbances, or discharge  ENMT:  No difficulty hearing, tinnitus, vertigo; No sinus or throat pain  NECK: No pain or stiffness  BREASTS: No pain, masses, or nipple discharge  RESPIRATORY: No cough, wheezing, chills or hemoptysis; No shortness of breath  CARDIOVASCULAR: No chest pain, palpitations, dizziness, or leg swelling  GASTROINTESTINAL: No abdominal or epigastric pain. No nausea, vomiting, or hematemesis; No diarrhea or constipation. No melena or hematochezia.  GENITOURINARY: No dysuria, frequency, hematuria, or incontinence  NEUROLOGICAL: No headaches, memory loss, loss of strength, numbness, or tremors  SKIN: No itching, burning, rashes, or lesions   LYMPH NODES: No enlarged glands  ENDOCRINE: No heat or cold intolerance; No hair loss  MUSCULOSKELETAL: No joint pain or swelling; No muscle, back, or extremity pain  PSYCHIATRIC: No depression, anxiety, mood swings, or difficulty sleeping  HEME/LYMPH: No easy bruising, or bleeding gums  ALLERGY AND IMMUNOLOGIC: No hives or eczema    MEDICATIONS  (STANDING):  ampicillin/sulbactam  IVPB 3 Gram(s) IV Intermittent every 24 hours  ampicillin/sulbactam  IVPB      aspirin enteric coated 81 milliGRAM(s) Oral daily  atorvastatin 40 milliGRAM(s) Oral at bedtime  chlorhexidine 4% Liquid 1 Application(s) Topical <User Schedule>  cloNIDine 0.1 milliGRAM(s) Oral two times a day  clopidogrel Tablet 75 milliGRAM(s) Oral daily  dextrose 5%. 1000 milliLiter(s) (50 mL/Hr) IV Continuous <Continuous>  dextrose 50% Injectable 25 Gram(s) IV Push once  epoetin fawad-epbx (RETACRIT) Injectable 21426 Unit(s) IV Push <User Schedule>  glucagon  Injectable 1 milliGRAM(s) IntraMuscular once  heparin   Injectable 5000 Unit(s) SubCutaneous every 12 hours  imipramine 50 milliGRAM(s) Oral daily  insulin glargine Injectable (LANTUS) 7 Unit(s) SubCutaneous at bedtime  insulin lispro (ADMELOG) corrective regimen sliding scale   SubCutaneous at bedtime  insulin lispro (ADMELOG) corrective regimen sliding scale   SubCutaneous three times a day before meals  metoprolol tartrate 50 milliGRAM(s) Oral two times a day  pantoprazole    Tablet 40 milliGRAM(s) Oral before breakfast    MEDICATIONS  (PRN):  acetaminophen     Tablet .. 650 milliGRAM(s) Oral every 6 hours PRN Temp greater or equal to 38C (100.4F), Mild Pain (1 - 3)  aluminum hydroxide/magnesium hydroxide/simethicone Suspension 30 milliLiter(s) Oral every 4 hours PRN Dyspepsia  dextrose Oral Gel 15 Gram(s) Oral once PRN Blood Glucose LESS THAN 70 milliGRAM(s)/deciliter  melatonin 3 milliGRAM(s) Oral at bedtime PRN Insomnia      ALLERGIES: latex (Unknown)  No Known Drug Allergies      FAMILY HISTORY:  No pertinent family history in first degree relatives        PHYSICAL EXAMINATION:  -----------------------------  T(C): 36.6 (06-29-22 @ 04:48), Max: 38 (06-28-22 @ 21:17)  HR: 104 (06-29-22 @ 04:48) (102 - 119)  BP: 150/73 (06-29-22 @ 04:48) (111/59 - 150/73)  RR: 18 (06-29-22 @ 04:48) (15 - 20)  SpO2: 98% (06-29-22 @ 04:48) (90% - 98%)  Wt(kg): --    06-28 @ 07:01  -  06-29 @ 07:00  --------------------------------------------------------  IN:    Oral Fluid: 240 mL  Total IN: 240 mL    OUT:  Total OUT: 0 mL    Total NET: 240 mL            VITALS  T(C): 36.6 (06-29-22 @ 04:48), Max: 38 (06-28-22 @ 21:17)  HR: 104 (06-29-22 @ 04:48) (102 - 119)  BP: 150/73 (06-29-22 @ 04:48) (111/59 - 150/73)  RR: 18 (06-29-22 @ 04:48) (15 - 20)  SpO2: 98% (06-29-22 @ 04:48) (90% - 98%)    Constitutional: well developed, normal appearance, well groomed, well nourished, no deformities and no acute distress.   Eyes: the conjunctiva exhibited no abnormalities and the eyelids demonstrated no xanthelasmas.   HEENT: normal oral mucosa, no oral pallor and no oral cyanosis.   Neck: normal jugular venous A waves present, normal jugular venous V waves present and no jugular venous wilson A waves.   Pulmonary: no respiratory distress, normal respiratory rhythm and effort, no accessory muscle use and lungs were clear to auscultation bilaterally.   Cardiovascular: heart rate and rhythm were normal, normal S1 and S2 and no murmur, gallop, rub, heave or thrill are present.   Abdomen: soft, non-tender, no hepato-splenomegaly and no abdominal mass palpated.   Musculoskeletal: the gait could not be assessed..   Extremities: no clubbing of the fingernails, no localized cyanosis, no petechial hemorrhages and no ischemic changes.   Skin: normal skin color and pigmentation, no rash, no venous stasis, no skin lesions, no skin ulcer and no xanthoma was observed.   Psychiatric: oriented to person, place, and time, the affect was normal, the mood was normal and not feeling anxious.     LABS:   --------  06-29    134<L>  |  92<L>  |  50<H>  ----------------------------<  224<H>  4.1   |  31  |  4.80<H>    Ca    9.6      29 Jun 2022 07:15  Phos  2.3     06-29  Mg     2.6     06-29    TPro  7.0  /  Alb  2.4<L>  /  TBili  0.5  /  DBili  x   /  AST  26  /  ALT  11<L>  /  AlkPhos  121<H>  06-29                         8.9    13.58 )-----------( 378      ( 29 Jun 2022 07:15 )             28.8             Culture Results:   No growth to date. (06-27 @ 11:45)  Culture Results:   No growth to date. (06-27 @ 11:40)      RADIOLOGY:  -----------------    ECG:     ECHO:

## 2022-06-29 NOTE — PROGRESS NOTE ADULT - ASSESSMENT
Patient is a 71 year old female with PMH of A.fib rate controlled not on AC for hx of multiple falls, T2DM, HTN, HLD, ESRD on HD, CHF, s/p CABG brought in by EMS for generalized weakness at home. Patient states that she was doing well when in the afternoon she tried to get up from her couch and felt weak in the LE and fell back in her couch. Denies any hx of head trauma or loss of consciousness. Denies any weakness or numbness. Denies any chest pain, SOB or palpitations. No fever, cough or chills. No hx of recent travel or sick contacts. At the time of EMS arrival patient was found to have POCBS of 50. EMS gave dextrose and on arrival to the ED patient blood sugar was found to be in 30's with hypothermia of 94.9.    ED course:   Vitals:   BP: 176/85  HR:76  Temp:94.2  RR:18, O2:96% on RA   Significant Labs:   CBC: WBC:16.82 Hb:13.2 , Platlets: 260, Neutro:89   CMP: Lytes: WNL, BUN/Creatinine:48/4.8, Glucose:134 , Alkaline Phos:128, AST, 41, eGFR: 9, HsTrop: 275.9, ProBNP: 184700, Lactate: 2  UA; negative  CXR: Heart and lung appear normal (self read)  CT BRAIN: No acute intracranial bleeding. Central volume loss, chronic microvascular ischemic changes, and chronic bilateral lacunar infarctions.  CT CERVICAL SPINE: No fracture. Grade 1 C4-C5 anterolisthesis. C6-C7 disc degeneration. Bilateral pleural effusions and interlobular septal thickening due to   interstitial edema.  EKG: NSR, left axis deviation, HR 71,   QT/QTc: 426/462  Patient received: Ceftriaxone 1 g x1, Dextrose 5% + NS 1L x1, Dextrose 50% IV X1, heating blanket, 2L NC   (16 Jun 2022 01:19)      rrt called transfer to micu     esrd on hd plan for hd as order        ANEMIA PLAN:  Anemia of chronic disease:  We will continue epo  aiming for a HCT of 32-36 %.   We will monitor Iron stores, B12 and RBC folate .    BP monitoring,continue current antihypertensive meds, low salt diet  metoprolol tartrate 100 milliGRAM(s) Oral every 12 hours  cloNIDine 0.1 milliGRAM(s) Oral two times a day    f/u  blood and urine cx,serial lactate levels,monitor vitals closley,ivfs hydration,monitor urine output and renal profile,iv abx  cefTRIAXone   IVPB 2000 milliGRAM(s) IV Intermittent every 24 hours      dvt heparin   Injectable 5000 Unit(s) SubCutaneous every 12 hours     Intraventricular hemorrhage

## 2022-06-29 NOTE — PROGRESS NOTE ADULT - ASSESSMENT
pt with hypoglycemia  elevated troponin due to renal failure  ashd , s/p mi  s/p coronary stent  s/p cabg  chf-hfref  esrd - on hd  dm2  hypertension  paf -not on oac - fall risk  pvd - s/p stent  dyslipidemia   chf - compensated - recent coronary angiogram - patent graft -pt's cradiac status stable low  to intermediate risk for  pvd surgery and amputation of great  toe if needed  6/21  pt tolerated rt femoral -poplitael by-pass 6/21  cardiac status stable low risk for rt big toe amputation 6/23  pt tolerated amputation of left great toe 6/23  pt had bradycardia and hypotension- transferred to icu- had atropine and dopamine-metoprolol and cardizem dc 6/27  now sinus tachycardia and bp improved 6/28 pt is more alert today 6/29

## 2022-06-29 NOTE — PROGRESS NOTE ADULT - SUBJECTIVE AND OBJECTIVE BOX
Patient is a 71y old  Female who presents with a chief complaint of SIRS (29 Jun 2022 12:19)      INTERVAL HPI/OVERNIGHT EVENTS: Patient seen and examined at bedside. No overnight events occurred. Patient has no complaints at this time. Denies fevers, chills, headache, lightheadedness, chest pain, dyspnea, abdominal pain, n/v/d/c.    MEDICATIONS  (STANDING):  ampicillin/sulbactam  IVPB 3 Gram(s) IV Intermittent every 24 hours  ampicillin/sulbactam  IVPB      aspirin enteric coated 81 milliGRAM(s) Oral daily  atorvastatin 40 milliGRAM(s) Oral at bedtime  chlorhexidine 4% Liquid 1 Application(s) Topical <User Schedule>  cloNIDine 0.1 milliGRAM(s) Oral two times a day  clopidogrel Tablet 75 milliGRAM(s) Oral daily  dextrose 5%. 1000 milliLiter(s) (50 mL/Hr) IV Continuous <Continuous>  dextrose 50% Injectable 25 Gram(s) IV Push once  epoetin fawad-epbx (RETACRIT) Injectable 31853 Unit(s) IV Push <User Schedule>  glucagon  Injectable 1 milliGRAM(s) IntraMuscular once  heparin   Injectable 5000 Unit(s) SubCutaneous every 12 hours  imipramine 50 milliGRAM(s) Oral daily  insulin glargine Injectable (LANTUS) 7 Unit(s) SubCutaneous at bedtime  insulin lispro (ADMELOG) corrective regimen sliding scale   SubCutaneous at bedtime  insulin lispro (ADMELOG) corrective regimen sliding scale   SubCutaneous three times a day before meals  metoprolol tartrate 50 milliGRAM(s) Oral two times a day  pantoprazole    Tablet 40 milliGRAM(s) Oral before breakfast    MEDICATIONS  (PRN):  acetaminophen     Tablet .. 650 milliGRAM(s) Oral every 6 hours PRN Temp greater or equal to 38C (100.4F), Mild Pain (1 - 3)  aluminum hydroxide/magnesium hydroxide/simethicone Suspension 30 milliLiter(s) Oral every 4 hours PRN Dyspepsia  dextrose Oral Gel 15 Gram(s) Oral once PRN Blood Glucose LESS THAN 70 milliGRAM(s)/deciliter  melatonin 3 milliGRAM(s) Oral at bedtime PRN Insomnia      Allergies    latex (Unknown)  No Known Drug Allergies    Intolerances        REVIEW OF SYSTEMS:  CONSTITUTIONAL: No fever or chills  HEENT:  No headache, no sore throat  RESPIRATORY: No cough, wheezing, or shortness of breath  CARDIOVASCULAR: No chest pain, palpitations  GASTROINTESTINAL: No abd pain, nausea, vomiting, or diarrhea  GENITOURINARY: No dysuria, frequency, or hematuria  NEUROLOGICAL: no focal weakness or dizziness  MUSCULOSKELETAL: no myalgias     Vital Signs Last 24 Hrs  T(C): 36.8 (29 Jun 2022 09:40), Max: 38 (28 Jun 2022 21:17)  T(F): 98.2 (29 Jun 2022 09:40), Max: 100.4 (28 Jun 2022 21:17)  HR: 118 (29 Jun 2022 09:40) (102 - 118)  BP: 129/68 (29 Jun 2022 09:40) (111/59 - 150/73)  BP(mean): 87 (28 Jun 2022 14:05) (87 - 87)  RR: 18 (29 Jun 2022 09:40) (15 - 18)  SpO2: 98% (29 Jun 2022 04:48) (90% - 98%)    PHYSICAL EXAM:  GENERAL: NAD  HEENT:  anicteric, moist mucous membranes  CHEST/LUNG:  CTA b/l, no rales, wheezes, or rhonchi  HEART:  RRR, S1, S2  ABDOMEN:  BS+, soft, nontender, nondistended  EXTREMITIES: no edema, cyanosis, or calf tenderness  NERVOUS SYSTEM: answers questions and follows commands appropriately    LABS:                        8.9    13.58 )-----------( 378      ( 29 Jun 2022 07:15 )             28.8     CBC Full  -  ( 29 Jun 2022 07:15 )  WBC Count : 13.58 K/uL  Hemoglobin : 8.9 g/dL  Hematocrit : 28.8 %  Platelet Count - Automated : 378 K/uL  Mean Cell Volume : 92.6 fl  Mean Cell Hemoglobin : 28.6 pg  Mean Cell Hemoglobin Concentration : 30.9 gm/dL  Auto Neutrophil # : 10.74 K/uL  Auto Lymphocyte # : 1.15 K/uL  Auto Monocyte # : 1.21 K/uL  Auto Eosinophil # : 0.17 K/uL  Auto Basophil # : 0.05 K/uL  Auto Neutrophil % : 79.0 %  Auto Lymphocyte % : 8.5 %  Auto Monocyte % : 8.9 %  Auto Eosinophil % : 1.3 %  Auto Basophil % : 0.4 %    29 Jun 2022 07:15    134    |  92     |  50     ----------------------------<  224    4.1     |  31     |  4.80     Ca    9.6        29 Jun 2022 07:15  Phos  2.3       29 Jun 2022 07:15  Mg     2.6       29 Jun 2022 07:15    TPro  7.0    /  Alb  2.4    /  TBili  0.5    /  DBili  x      /  AST  26     /  ALT  11     /  AlkPhos  121    29 Jun 2022 07:15        CAPILLARY BLOOD GLUCOSE      POCT Blood Glucose.: 155 mg/dL (29 Jun 2022 13:04)  POCT Blood Glucose.: 200 mg/dL (29 Jun 2022 11:50)  POCT Blood Glucose.: 257 mg/dL (29 Jun 2022 08:54)  POCT Blood Glucose.: 352 mg/dL (28 Jun 2022 21:32)  POCT Blood Glucose.: 203 mg/dL (28 Jun 2022 16:50)        Culture - Blood (collected 06-27-22 @ 11:45)  Source: .Blood Blood-Peripheral  Preliminary Report (06-28-22 @ 17:02):    No growth to date.    Culture - Blood (collected 06-27-22 @ 11:40)  Source: .Blood Blood-Peripheral  Preliminary Report (06-28-22 @ 17:02):    No growth to date.    Culture - Tissue with Gram Stain (collected 06-23-22 @ 15:35)  Source: .Tissue Other, right hallux bone culture  Gram Stain (06-24-22 @ 04:37):    Rare polymorphonuclear leukocytes seen per low power field    Few Gram positive cocci in pairs seen per oil power field  Final Report (06-28-22 @ 13:56):    Few Staphylococcus aureus    Rare Enterococcus faecalis  Organism: Staphylococcus aureus  Enterococcus faecalis (06-28-22 @ 13:56)  Organism: Enterococcus faecalis (06-28-22 @ 13:56)      -  Ampicillin: S <=2 Predicts results to ampicillin/sulbactam, amoxacillin-clavulanate and  piperacillin-tazobactam.      -  Tetra/Doxy: R >8      -  Vancomycin: S 2      Method Type: SONIA  Organism: Staphylococcus aureus (06-28-22 @ 13:56)      -  Ampicillin/Sulbactam: S <=8/4      -  Cefazolin: S <=4      -  Clindamycin: R <=0.25 This isolate is presumed to be clindamycin resistant based on detection of inducible resistance. Clindamycin may still be effective in some patients.      -  Erythromycin: R >4      -  Gentamicin: S <=1 Should not be used as monotherapy      -  Oxacillin: S <=0.25 Oxacillin predicts susceptibility for dicloxacillin, methicillin, and nafcillin      -  Penicillin: R >8      -  Rifampin: S <=1 Should not be used as monotherapy      -  Tetra/Doxy: S <=1      -  Trimethoprim/Sulfamethoxazole: S <=0.5/9.5      -  Vancomycin: S 1      Method Type: SONIA        RADIOLOGY & ADDITIONAL TESTS:    Personally reviewed.     Consultant(s) Notes Reviewed:  [x] YES  [ ] NO

## 2022-06-29 NOTE — PROVIDER CONTACT NOTE (OTHER) - ASSESSMENT
Pt A&Ox2. Pt asymptomatic. Pt denies pain/discomfort, palpitations or SOB. No acute distress  HR in 130's, a.fib
Pt AAOX4, no complaints. No s/s acute distress. Tolerating NPO after midnight

## 2022-06-29 NOTE — PROVIDER CONTACT NOTE (CHANGE IN STATUS NOTIFICATION) - ASSESSMENT
Pt A&Ox1. Pt agitated, restless, combative. Pt attempting to get OOB Pt A&Ox1. Pt agitated, restless, combative. Pt attempting to get OOB. No acute distress

## 2022-06-29 NOTE — PROGRESS NOTE ADULT - PROBLEM SELECTOR PLAN 1
S/p Right hallux amputation, (DOS: 6/23/22)  - s/p RLE bypass graft  - Pt seen and evaluated.  -  Dressing changed using xeroform and DSD  - Continue IV abx per ID  - Continue vascular recs  - Continue med management   - OR culture Staph Aureus & E. faecalis  - OR pathology: right 1st met clean margin: Minimal chronic osteomyelitis  - Pt to partial weight bear to the right heel as tolerated with assisted device (pt has a history of falls)  - No further podiatric intervention at this time. Pt is stable from out standpoint    WOUND CARE INSTRUCTIONS  1. Please leave dressing clean, dry and intact until follow-up. Monitor for any signs of infection and present to the ED if they arise.  2. If the dressing gets wet, please change with dry, sterile dressing.  3. Patient to be partial weight bear to the right heel as tolerated with assisted device (pt has a history of falls)  4. Patient is to follow up [facility] with Dr. Lau or Dr. Pastor within 3-5 days upon discharge. Please call [phone] as soon as discharged and state that it is for a "post-op appointment".

## 2022-06-29 NOTE — PROVIDER CONTACT NOTE (OTHER) - SITUATION
Pt remains in rapid a.fib
Fluids order in place however pt received without fluids. calling to clarify whether appropriate in light of ESRD and other comorbidities.
Pt noted to be hyperglycemic on previous accu check. Insulin given overnight as prescribed. Repeat glucose this .

## 2022-06-29 NOTE — PROGRESS NOTE ADULT - SUBJECTIVE AND OBJECTIVE BOX
71y year old Female seen at bedside. Pt downgraded from MICU. Pt  S/p Right hallux amputation (DOS: 6/23/22). Pt doing well, feeling much better today.   Allergies    MEDICATIONS  (STANDING):  ampicillin/sulbactam  IVPB 3 Gram(s) IV Intermittent every 24 hours  ampicillin/sulbactam  IVPB      aspirin enteric coated 81 milliGRAM(s) Oral daily  atorvastatin 40 milliGRAM(s) Oral at bedtime  chlorhexidine 4% Liquid 1 Application(s) Topical <User Schedule>  cloNIDine 0.1 milliGRAM(s) Oral two times a day  clopidogrel Tablet 75 milliGRAM(s) Oral daily  dextrose 5%. 1000 milliLiter(s) (50 mL/Hr) IV Continuous <Continuous>  dextrose 50% Injectable 25 Gram(s) IV Push once  diltiazem Infusion 5 mG/Hr (5 mL/Hr) IV Continuous <Continuous>  epoetin fawad-epbx (RETACRIT) Injectable 22922 Unit(s) IV Push <User Schedule>  glucagon  Injectable 1 milliGRAM(s) IntraMuscular once  heparin   Injectable 5000 Unit(s) SubCutaneous every 12 hours  imipramine 50 milliGRAM(s) Oral daily  insulin glargine Injectable (LANTUS) 7 Unit(s) SubCutaneous at bedtime  insulin lispro (ADMELOG) corrective regimen sliding scale   SubCutaneous at bedtime  insulin lispro (ADMELOG) corrective regimen sliding scale   SubCutaneous three times a day before meals  metoprolol tartrate 50 milliGRAM(s) Oral every 8 hours  pantoprazole    Tablet 40 milliGRAM(s) Oral before breakfast    MEDICATIONS  (PRN):  acetaminophen     Tablet .. 650 milliGRAM(s) Oral every 6 hours PRN Temp greater or equal to 38C (100.4F), Mild Pain (1 - 3)  aluminum hydroxide/magnesium hydroxide/simethicone Suspension 30 milliLiter(s) Oral every 4 hours PRN Dyspepsia  dextrose Oral Gel 15 Gram(s) Oral once PRN Blood Glucose LESS THAN 70 milliGRAM(s)/deciliter  melatonin 3 milliGRAM(s) Oral at bedtime PRN Insomnia    Vital Signs Last 24 Hrs  T(C): 36.8 (29 Jun 2022 13:30), Max: 38 (28 Jun 2022 21:17)  T(F): 98.2 (29 Jun 2022 13:30), Max: 100.4 (28 Jun 2022 21:17)  HR: 101 (29 Jun 2022 14:17) (101 - 125)  BP: 134/77 (29 Jun 2022 13:56) (111/59 - 150/73)  BP(mean): --  RR: 18 (29 Jun 2022 13:30) (18 - 18)  SpO2: 98% (29 Jun 2022 04:48) (90% - 98%)    Allergies    latex (Unknown)  No Known Drug Allergies    Intolerances    CBC Full  -  ( 29 Jun 2022 07:15 )  WBC Count : 13.58 K/uL  RBC Count : 3.11 M/uL  Hemoglobin : 8.9 g/dL  Hematocrit : 28.8 %  Platelet Count - Automated : 378 K/uL  Mean Cell Volume : 92.6 fl  Mean Cell Hemoglobin : 28.6 pg  Mean Cell Hemoglobin Concentration : 30.9 gm/dL  Auto Neutrophil # : 10.74 K/uL  Auto Lymphocyte # : 1.15 K/uL  Auto Monocyte # : 1.21 K/uL  Auto Eosinophil # : 0.17 K/uL  Auto Basophil # : 0.05 K/uL  Auto Neutrophil % : 79.0 %  Auto Lymphocyte % : 8.5 %  Auto Monocyte % : 8.9 %  Auto Eosinophil % : 1.3 %  Auto Basophil % : 0.4 %      PHYSICAL EXAM:  Vascular: Right DP & PT dopplerable.  Capillary refill (CFT) 3 seconds. Digital hair absent bilaterally.   Neurological: Light touch sensation diminished bilaterally.  Musculoskeletal: s/p right hallux amputation. strength in all quadrants bilaterally, AJ & STJ ROM absent b/l.   Dermatological: sutures to right hallux intact. No signs of wound dehiscence. No signs of infection.       Culture - Tissue with Gram Stain (collected 23 Jun 2022 15:35)  Source: .Tissue Other, right hallux bone culture  Gram Stain (24 Jun 2022 04:37):    Rare polymorphonuclear leukocytes seen per low power field    Few Gram positive cocci in pairs seen per oil power field        ACCESSION No:  [id number]  Patient:   SHILO KOHLER      Accession:                             [id number]    Collected Date/Time:                   6/23/2022 15:35 EDT  Received Date/Time:                    6/24/2022 07:44 EDT    Surgical Pathology Report - Auth (Verified)    Specimen(s) Submitted  1  Right hallux pathology  2  Right first metatarsal/clean margin pathology    Final Diagnosis  1. Right hallux  -Acute and chronic osteomyelitis.  -Gangrenous skin and soft tissue.    2. Right first metatarsal/clean margin:  -Minimal chronic osteomyelitis.    Verified by: Randall Rodriguez MD  (Electronic Signature)  Reported on: 06/27/22 12:18 EDT, [facility], [address] [place] [address]

## 2022-06-29 NOTE — CHART NOTE - NSCHARTNOTEFT_GEN_A_CORE
called by RN @ 18:40 for agitation. Patient seen and examined at the bedside. Pt A&Ox1. Restless. Patient  attempting to get OOB. No acute distress  - Reorientation attempts and calming measures unsuccessful  - x1 dose Zyprexa  - Fall precautions     Patient with A fib -140's on Tele. other vitals stable. Patient in a fib with elevated HR since am.   - Cardizem gtt  increase from 10--->15ml/hr with hold parameter  - 's    - Received lopressor 50mg PO nighttime dose--> Repeat vitals in an hour called by RN @ 18:40 for agitation. Patient seen and examined at the bedside. Pt A&Ox1. Restless. Patient  attempting to get OOB. No acute distress  - Reorientation attempts and calming measures unsuccessful  - x1 dose Zyprexa  - Fall precautions     Patient with A fib -140's on Tele. other vitals stable. Patient in a fib with elevated HR since am.   - Cardizem gtt  increase from 10--->15ml/hr with hold parameter  - Received lopressor 50mg PO nighttime dose--> Repeat vitals in an hour    addendum @24:00    Vital Signs Last 24 Hrs  T(C): 36.9 (29 Jun 2022 23:55), Max: 36.9 (29 Jun 2022 23:55)  T(F): 98.4 (29 Jun 2022 23:55), Max: 98.4 (29 Jun 2022 23:55)  HR: 73 (29 Jun 2022 23:57) (73 - 132)  BP: 119/60 (29 Jun 2022 23:57) (119/60 - 150/73)  BP(mean): --  RR: 18 (29 Jun 2022 23:55) (18 - 18)  SpO2: 96% (29 Jun 2022 23:55) (90% - 98%)    PHYSICAL EXAM:  GENERAL: NAD  CHEST/LUNG: CTA  b/l , no rhonchi   HEART: Tachycardic w/ irregularly irregular rates; No murmurs, rubs, or gallops  ABDOMEN: Bowel sounds present; Soft, Nontender, Nondistended.  EXTREMITIES:  No clubbing, cyanosis, or edema, Right 1st toe amputation, stitches right thigh site  NERVOUS SYSTEM:  Alert x1  speech clear. No deficits     PLAN:   - Sinus rhythm on tele @ 78   - continue Cardizem gtt   - VSS  - RN to call if any changes

## 2022-06-29 NOTE — PROGRESS NOTE ADULT - SUBJECTIVE AND OBJECTIVE BOX
Patient is a 71y Female whom presented to the hospital with esrd on hd     PAST MEDICAL & SURGICAL HISTORY:  Diabetes mellitus II      HTN (hypertension)      h/o Anxiety attack      Depression      h/o Myocardial infarct 2007      CAD (coronary artery disease)      h/o Hepatitis A 1969  currently resolved, no symptoms      PAD (peripheral artery disease)      Murmur, cardiac      h/o Smoking  quitted 3/2012      CRF (chronic renal failure), unspecified stage      Dialysis patient      Anemia secondary to renal failure      HTN (hypertension)      coronary stent 2007      s/p Ovarian cyst removal      s/p surgical removal of benign Skin lesion epigastric area          MEDICATIONS  (STANDING):  amLODIPine   Tablet 5 milliGRAM(s) Oral daily  aspirin enteric coated 81 milliGRAM(s) Oral daily  atorvastatin 40 milliGRAM(s) Oral at bedtime  calcium acetate 667 milliGRAM(s) Oral three times a day with meals  cefTRIAXone   IVPB 1000 milliGRAM(s) IV Intermittent every 24 hours  cloNIDine 0.1 milliGRAM(s) Oral two times a day  clopidogrel Tablet 75 milliGRAM(s) Oral daily  dextrose 5%. 1000 milliLiter(s) (100 mL/Hr) IV Continuous <Continuous>  dextrose 5%. 1000 milliLiter(s) (50 mL/Hr) IV Continuous <Continuous>  dextrose 50% Injectable 25 Gram(s) IV Push once  dextrose 50% Injectable 12.5 Gram(s) IV Push once  dextrose 50% Injectable 25 Gram(s) IV Push once  diltiazem    Tablet 60 milliGRAM(s) Oral every 8 hours  glucagon  Injectable 1 milliGRAM(s) IntraMuscular once  heparin   Injectable 5000 Unit(s) SubCutaneous every 12 hours  imipramine 50 milliGRAM(s) Oral daily  insulin glargine Injectable (LANTUS) 12 Unit(s) SubCutaneous at bedtime  insulin lispro (ADMELOG) corrective regimen sliding scale   SubCutaneous three times a day before meals  insulin lispro (ADMELOG) corrective regimen sliding scale   SubCutaneous at bedtime  metoprolol tartrate 100 milliGRAM(s) Oral every 12 hours  pantoprazole    Tablet 40 milliGRAM(s) Oral before breakfast  povidone iodine 10% Solution 1 Application(s) Topical daily      Allergies    latex (Unknown)  No Known Drug Allergies    Intolerances        SOCIAL HISTORY:  Denies ETOh,Smoking,     FAMILY HISTORY:  No pertinent family history in first degree relatives        REVIEW OF SYSTEMS:    CONSTITUTIONAL: No weakness, fevers or chills  RESPIRATORY: No cough, wheezing, hemoptysis; No shortness of breath  CARDIOVASCULAR: No chest pain or palpitations  GASTROINTESTINAL: No abdominal or epigastric pain. No nausea, vomiting,     No diarrhea or constipation. No melena   SKIN: dry                                                                                                                        8.9    13.58 )-----------( 378      ( 29 Jun 2022 07:15 )             28.8       CBC Full  -  ( 29 Jun 2022 07:15 )  WBC Count : 13.58 K/uL  RBC Count : 3.11 M/uL  Hemoglobin : 8.9 g/dL  Hematocrit : 28.8 %  Platelet Count - Automated : 378 K/uL  Mean Cell Volume : 92.6 fl  Mean Cell Hemoglobin : 28.6 pg  Mean Cell Hemoglobin Concentration : 30.9 gm/dL  Auto Neutrophil # : 10.74 K/uL  Auto Lymphocyte # : 1.15 K/uL  Auto Monocyte # : 1.21 K/uL  Auto Eosinophil # : 0.17 K/uL  Auto Basophil # : 0.05 K/uL  Auto Neutrophil % : 79.0 %  Auto Lymphocyte % : 8.5 %  Auto Monocyte % : 8.9 %  Auto Eosinophil % : 1.3 %  Auto Basophil % : 0.4 %      06-29    134<L>  |  92<L>  |  50<H>  ----------------------------<  224<H>  4.1   |  31  |  4.80<H>    Ca    9.6      29 Jun 2022 07:15  Phos  2.3     06-29  Mg     2.6     06-29    TPro  7.0  /  Alb  2.4<L>  /  TBili  0.5  /  DBili  x   /  AST  26  /  ALT  11<L>  /  AlkPhos  121<H>  06-29      CAPILLARY BLOOD GLUCOSE      POCT Blood Glucose.: 174 mg/dL (29 Jun 2022 21:28)  POCT Blood Glucose.: 238 mg/dL (29 Jun 2022 16:59)  POCT Blood Glucose.: 155 mg/dL (29 Jun 2022 13:04)  POCT Blood Glucose.: 200 mg/dL (29 Jun 2022 11:50)  POCT Blood Glucose.: 257 mg/dL (29 Jun 2022 08:54)      Vital Signs Last 24 Hrs  T(C): 36.6 (29 Jun 2022 20:05), Max: 36.8 (29 Jun 2022 01:00)  T(F): 97.9 (29 Jun 2022 20:05), Max: 98.2 (29 Jun 2022 01:00)  HR: 128 (29 Jun 2022 21:56) (101 - 132)  BP: 135/60 (29 Jun 2022 21:56) (129/68 - 150/73)  BP(mean): --  RR: 18 (29 Jun 2022 20:05) (18 - 18)  SpO2: 90% (29 Jun 2022 20:05) (90% - 98%)              PHYSICAL EXAM:    Constitutional: NAD  HEENT: conjunctive   clear   Neck:  No JVD  Respiratory: CTAB  Cardiovascular: S1 and S2  Gastrointestinal: BS+, soft, NT/ND  Skin: dry

## 2022-06-29 NOTE — PROGRESS NOTE ADULT - ASSESSMENT
72 yo woman with Hx ERSD, PVD, CAD, CABG admitted 6/16 with R hallux gangrene requiring R fem-pop bypass and partial ray amputation.  Course complicated by   episode of junctional bradycardia (likely med related), resulting in cardiogenic shock and encephalopathy with transfer to ICU.  Now resolved, monitoring on telemetry.     Problem/Plan - 1:  ·  Problem: Hypoglycemia.  ·  Plan: - Patient presented with c/o generalized weakness and fall and was found to have blood sugar in 30's  - Patient with hx of T2DM, on Lantus 40 units qhs not on any oral hypoglycemics per outpatient pharmacy   - Accu checks  - Lantus decreased from 10 to 7units as pt was hypoglycemic again - FS ranging 155-352  - continue ISS        Problem/Plan - 2:  ·  Problem: SIRS (systemic inflammatory response syndrome). probably 2/2 to Rt toe infection  ·  Plan: -- CXR: no clear evidence of PNA   - Hx of L medial malleolus growing klebsiella oxytoca/raoutella oxytoca, E. coli, MSSA. Recent blood cultures negative.  - S/P R fem-BK pop w/PTFE POD# 5, S/P Partial ray amputation of R foot  POD #3  - reactive leucocytosis.  - Bone culture growing MSSA  - Changed to Unasyn per ID recs, to continue until July 26  - Requested IR PICC line  - ID recs appreciated    Problem/Plan - 3:  ·  Problem: Pleural effusion.   ·  Plan: - No Hx of pulmonary disease  - Patient does not c/o cough, fever or chills  - CT Cervical shows Bilateral pleural effusions and interlobular septal thickening due to interstitial edema.  - ID Dr. Tsai, consult appreciated      Problem/Plan - 4:  ·  Problem: HFrEF (heart failure with reduced ejection fraction).   ·  Plan: - patient with hx of HFrEF  - last ECHO in 04/22 shows EF of 45%, moderate MR  - Admission labs show ProBNP of [id number]  - Likely elevated in the setting of ESRD  - Consult Dr. James, appreciate recs   - Avoid excessive fluids.     Problem/Plan - 5:  ·  Problem: Elevated troponin.   ·  Plan: - Admission labs show elevated Troponin of 275.9  - Patient denies any chest pain, sob or palpitations  - EKG shows NSR with left axis deviation with non specific ST and T waves changes  - elevated troponin likely due to ESRD     Problem/Plan - 6:  ·  Problem: ESRD on hemodialysis.   ·  Plan: - Patient with hx of ESRD  - On HD TTS schedule  - admission eGFR 9  - Follow Dr. Edwards      Problem/Plan - 7:  ·  Problem: T2DM (type 2 diabetes mellitus).   ·  Plan: - Chronic stable  - home dose Lantus 40 qhs  - plan as above     Problem/Plan - 8:  ·  Problem: Atrial fibrillation.   ·  Plan: - Patient with hx of P A.Fib  - This am, pt in HD, became tachycardic in SR, then converted to Afib with RVR. Lopressor 5mg IVP x1 administered. Pt now in afib w/ rate 118-120. /77. Will give Cardizem 5mg IVP x1 now and continue to monitor. will f/u with cardiologist Dr. James  - Lopressor 50 q12h, will change to q8 with hold parameters  - Not on any AC because of hx of multiple falls   - EKG shows NSR with left axis deviation with non specific ST and T waves changes.     Problem/Plan - 9:  ·  Problem: Benign essential HTN.   ·  Plan: - Chronic stable  - Continue clonidine 0.1mg BID  - Dash/Renal Diet  - continue to monitor routine hemodynamics.     Problem/Plan - 10:  ·  Problem: HLD (hyperlipidemia).   ·  Plan; - Chronic stable  - On atorvastatin 40mg at home - will continue  - Dash/Renal diet.     Problem/Plan - 11:  ·  Problem: Depression, major.   ·  Plan: - Chronic stable  - Continue imipramine 50qhs.     Problem/Plan - 12:  Junctional bradycardia resulting in cardiogenic shock  - Transferred to ICU, now resolved  - Clonidine for htn  - Started on lower dose of lopressor, however pt now in afib with rvr. plan as above     Problem/Plan - 13:  ·  Problem: Need for prophylactic measure.   ·  Plan: - DVT Prophylaxis: Heparin 5000 units q12. 72 yo woman with Hx ERSD, PVD, CAD, CABG admitted 6/16 with R hallux gangrene requiring R fem-pop bypass and partial ray amputation.  Course complicated by   episode of junctional bradycardia (likely med related), resulting in cardiogenic shock and encephalopathy with transfer to ICU, later resolved and transferred to  telemetry, now Afib with RVR.      Problem/Plan - 1:  ·  Problem: Hypoglycemia.  ·  Plan: - Patient presented with c/o generalized weakness and fall and was found to have blood sugar in 30's  - Patient with hx of T2DM, on Lantus 40 units qhs not on any oral hypoglycemics per outpatient pharmacy   - Accu checks  - Lantus decreased from 10 to 7units as pt was hypoglycemic again - FS ranging 155-352  - continue ISS        Problem/Plan - 2:  ·  Problem: SIRS (systemic inflammatory response syndrome). probably 2/2 to Rt toe infection  ·  Plan: -- CXR: no clear evidence of PNA   - Hx of L medial malleolus growing klebsiella oxytoca/raoutella oxytoca, E. coli, MSSA. Recent blood cultures negative.  - S/P R fem-BK pop w/PTFE POD# 5, S/P Partial ray amputation of R foot  POD #3  - reactive leucocytosis.  - Bone culture growing MSSA  - Changed to Unasyn per ID recs, to continue until July 26  - Requested IR PICC line  - ID recs appreciated    Problem/Plan - 3:  ·  Problem: Pleural effusion.   ·  Plan: - No Hx of pulmonary disease  - Patient does not c/o cough, fever or chills  - CT Cervical shows Bilateral pleural effusions and interlobular septal thickening due to interstitial edema.  - ID Dr. Tsai, consult appreciated      Problem/Plan - 4:  ·  Problem: HFrEF (heart failure with reduced ejection fraction).   ·  Plan: - patient with hx of HFrEF  - last ECHO in 04/22 shows EF of 45%, moderate MR  - Admission labs show ProBNP of 913172  - Likely elevated in the setting of ESRD  - Consult Dr. James, appreciate recs   - Avoid excessive fluids.     Problem/Plan - 5:  ·  Problem: Elevated troponin.   ·  Plan: - Admission labs show elevated Troponin of 275.9  - Patient denies any chest pain, sob or palpitations  - EKG shows NSR with left axis deviation with non specific ST and T waves changes  - elevated troponin likely due to ESRD     Problem/Plan - 6:  ·  Problem: ESRD on hemodialysis.   ·  Plan: - Patient with hx of ESRD  - On HD TTS schedule  - admission eGFR 9  - Follow Dr. Edwards      Problem/Plan - 7:  ·  Problem: T2DM (type 2 diabetes mellitus).   ·  Plan: - Chronic stable  - home dose Lantus 40 qhs  - plan as above     Problem/Plan - 8:  ·  Problem: Atrial fibrillation.   ·  Plan: - Patient with hx of P A.Fib  - This am, pt in HD, became tachycardic in SR, then converted to Afib with RVR. Lopressor 5mg IVP x1 administered. Pt now in afib w/ rate 118-120. /77. Will give Cardizem 5mg IVP x1 now and continue to monitor. will f/u with cardiologist Dr. James  - Lopressor 50 q12h, will change to q8 with hold parameters  - Not on any AC because of hx of multiple falls   - EKG shows NSR with left axis deviation with non specific ST and T waves changes.     Problem/Plan - 9:  ·  Problem: Benign essential HTN.   ·  Plan: - Chronic stable  - Continue clonidine 0.1mg BID  - Dash/Renal Diet  - continue to monitor routine hemodynamics.     Problem/Plan - 10:  ·  Problem: HLD (hyperlipidemia).   ·  Plan; - Chronic stable  - On atorvastatin 40mg at home - will continue  - Dash/Renal diet.     Problem/Plan - 11:  ·  Problem: Depression, major.   ·  Plan: - Chronic stable  - Continue imipramine 50qhs.     Problem/Plan - 12:  Junctional bradycardia resulting in cardiogenic shock  - Transferred to ICU, now resolved  - Clonidine for htn  - Started on lower dose of lopressor, however pt now in afib with rvr. plan as above     Problem/Plan - 13:  ·  Problem: Need for prophylactic measure.   ·  Plan: - DVT Prophylaxis: Heparin 5000 units q12.

## 2022-06-29 NOTE — PROVIDER CONTACT NOTE (CHANGE IN STATUS NOTIFICATION) - BACKGROUND
Admitting dx: hypoglycemia Admitting dx: hypoglycemia  Pt in rapid a.fib, diltiazem drip infusing @ 10mL/hr

## 2022-06-29 NOTE — PROVIDER CONTACT NOTE (CHANGE IN STATUS NOTIFICATION) - ACTION/TREATMENT ORDERED:
Dr. Shepherd notified awaiting further orders Dr. Shepherd notified, 1x 5mg IVP Dilitiazem ordered and administered   Metoprolol adjusted to 3 times a day

## 2022-06-29 NOTE — PROVIDER CONTACT NOTE (CHANGE IN STATUS NOTIFICATION) - ASSESSMENT
Pt A&Ox2. Pt asymptomatic, pt denies any pain/discomfort, palpitations or SOB. Pt appears comfortable eating lunch in bed, no acute distress  BP: 134/77  HR: 125, a.fib

## 2022-06-29 NOTE — PROVIDER CONTACT NOTE (CHANGE IN STATUS NOTIFICATION) - ACTION/TREATMENT ORDERED:
Dr. Sapp made aware, saw pt at bedside   2.5 IM Zyprexa ordered Dr. Sapp made aware, saw pt at bedside   2.5 IM Zyprexa ordered and administered  Bed alarm on

## 2022-06-29 NOTE — PROVIDER CONTACT NOTE (OTHER) - ACTION/TREATMENT ORDERED:
MD aware. No changes at this time as pt is NPO and being followed by Endo and Diabetic specialist.
md to add 4 units of insulin to the 6 units coverage
Continue to hold IV fluids at this time. Will endorse f/u to primary team.
Dr. Shepherd made aware and said diltiazem drip is going to be ordered  Monitoring continues

## 2022-06-30 LAB
ALBUMIN SERPL ELPH-MCNC: 2.4 G/DL — LOW (ref 3.3–5)
ALP SERPL-CCNC: 112 U/L — SIGNIFICANT CHANGE UP (ref 40–120)
ALT FLD-CCNC: 13 U/L — SIGNIFICANT CHANGE UP (ref 12–78)
ANION GAP SERPL CALC-SCNC: 10 MMOL/L — SIGNIFICANT CHANGE UP (ref 5–17)
APTT BLD: 29.7 SEC — SIGNIFICANT CHANGE UP (ref 27.5–35.5)
AST SERPL-CCNC: 19 U/L — SIGNIFICANT CHANGE UP (ref 15–37)
BASOPHILS # BLD AUTO: 0.03 K/UL — SIGNIFICANT CHANGE UP (ref 0–0.2)
BASOPHILS NFR BLD AUTO: 0.2 % — SIGNIFICANT CHANGE UP (ref 0–2)
BILIRUB SERPL-MCNC: 0.5 MG/DL — SIGNIFICANT CHANGE UP (ref 0.2–1.2)
BUN SERPL-MCNC: 29 MG/DL — HIGH (ref 7–23)
CALCIUM SERPL-MCNC: 9.4 MG/DL — SIGNIFICANT CHANGE UP (ref 8.5–10.1)
CHLORIDE SERPL-SCNC: 96 MMOL/L — SIGNIFICANT CHANGE UP (ref 96–108)
CK MB BLD-MCNC: <2.3 % — SIGNIFICANT CHANGE UP (ref 0–3.5)
CK MB CFR SERPL CALC: <1 NG/ML — SIGNIFICANT CHANGE UP (ref 0–3.6)
CK SERPL-CCNC: 44 U/L — SIGNIFICANT CHANGE UP (ref 26–192)
CO2 SERPL-SCNC: 28 MMOL/L — SIGNIFICANT CHANGE UP (ref 22–31)
CREAT SERPL-MCNC: 3.3 MG/DL — HIGH (ref 0.5–1.3)
EGFR: 14 ML/MIN/1.73M2 — LOW
EOSINOPHIL # BLD AUTO: 0.09 K/UL — SIGNIFICANT CHANGE UP (ref 0–0.5)
EOSINOPHIL NFR BLD AUTO: 0.7 % — SIGNIFICANT CHANGE UP (ref 0–6)
GLUCOSE SERPL-MCNC: 222 MG/DL — HIGH (ref 70–99)
HCT VFR BLD CALC: 27.6 % — LOW (ref 34.5–45)
HGB BLD-MCNC: 8.8 G/DL — LOW (ref 11.5–15.5)
IMM GRANULOCYTES NFR BLD AUTO: 1.5 % — SIGNIFICANT CHANGE UP (ref 0–1.5)
INR BLD: 1.15 RATIO — SIGNIFICANT CHANGE UP (ref 0.88–1.16)
LYMPHOCYTES # BLD AUTO: 0.82 K/UL — LOW (ref 1–3.3)
LYMPHOCYTES # BLD AUTO: 6 % — LOW (ref 13–44)
MCHC RBC-ENTMCNC: 29 PG — SIGNIFICANT CHANGE UP (ref 27–34)
MCHC RBC-ENTMCNC: 31.9 GM/DL — LOW (ref 32–36)
MCV RBC AUTO: 91.1 FL — SIGNIFICANT CHANGE UP (ref 80–100)
MONOCYTES # BLD AUTO: 0.95 K/UL — HIGH (ref 0–0.9)
MONOCYTES NFR BLD AUTO: 6.9 % — SIGNIFICANT CHANGE UP (ref 2–14)
NEUTROPHILS # BLD AUTO: 11.63 K/UL — HIGH (ref 1.8–7.4)
NEUTROPHILS NFR BLD AUTO: 84.7 % — HIGH (ref 43–77)
NRBC # BLD: 0 /100 WBCS — SIGNIFICANT CHANGE UP (ref 0–0)
PLATELET # BLD AUTO: 323 K/UL — SIGNIFICANT CHANGE UP (ref 150–400)
POTASSIUM SERPL-MCNC: 4.1 MMOL/L — SIGNIFICANT CHANGE UP (ref 3.5–5.3)
POTASSIUM SERPL-SCNC: 4.1 MMOL/L — SIGNIFICANT CHANGE UP (ref 3.5–5.3)
PROT SERPL-MCNC: 6.6 G/DL — SIGNIFICANT CHANGE UP (ref 6–8.3)
PROTHROM AB SERPL-ACNC: 13.5 SEC — HIGH (ref 10.5–13.4)
RBC # BLD: 3.03 M/UL — LOW (ref 3.8–5.2)
RBC # FLD: 17.9 % — HIGH (ref 10.3–14.5)
SODIUM SERPL-SCNC: 134 MMOL/L — LOW (ref 135–145)
TROPONIN I, HIGH SENSITIVITY RESULT: 93.1 NG/L — HIGH
WBC # BLD: 13.73 K/UL — HIGH (ref 3.8–10.5)
WBC # FLD AUTO: 13.73 K/UL — HIGH (ref 3.8–10.5)

## 2022-06-30 PROCEDURE — 70450 CT HEAD/BRAIN W/O DYE: CPT | Mod: 26

## 2022-06-30 PROCEDURE — 93010 ELECTROCARDIOGRAM REPORT: CPT

## 2022-06-30 PROCEDURE — 99233 SBSQ HOSP IP/OBS HIGH 50: CPT | Mod: GC

## 2022-06-30 PROCEDURE — 71045 X-RAY EXAM CHEST 1 VIEW: CPT | Mod: 26

## 2022-06-30 PROCEDURE — 99221 1ST HOSP IP/OBS SF/LOW 40: CPT

## 2022-06-30 PROCEDURE — 99232 SBSQ HOSP IP/OBS MODERATE 35: CPT

## 2022-06-30 RX ORDER — INSULIN GLARGINE 100 [IU]/ML
8 INJECTION, SOLUTION SUBCUTANEOUS AT BEDTIME
Refills: 0 | Status: DISCONTINUED | OUTPATIENT
Start: 2022-06-30 | End: 2022-07-06

## 2022-06-30 RX ORDER — METOPROLOL TARTRATE 50 MG
100 TABLET ORAL
Refills: 0 | Status: DISCONTINUED | OUTPATIENT
Start: 2022-06-30 | End: 2022-06-30

## 2022-06-30 RX ORDER — METOPROLOL TARTRATE 50 MG
5 TABLET ORAL EVERY 6 HOURS
Refills: 0 | Status: DISCONTINUED | OUTPATIENT
Start: 2022-06-30 | End: 2022-07-01

## 2022-06-30 RX ORDER — ACETAMINOPHEN 500 MG
1000 TABLET ORAL ONCE
Refills: 0 | Status: COMPLETED | OUTPATIENT
Start: 2022-06-30 | End: 2022-06-30

## 2022-06-30 RX ORDER — RISPERIDONE 4 MG/1
0.5 TABLET ORAL
Refills: 0 | Status: DISCONTINUED | OUTPATIENT
Start: 2022-06-30 | End: 2022-07-07

## 2022-06-30 RX ORDER — OLANZAPINE 15 MG/1
2.5 TABLET, FILM COATED ORAL EVERY 6 HOURS
Refills: 0 | Status: DISCONTINUED | OUTPATIENT
Start: 2022-06-30 | End: 2022-07-07

## 2022-06-30 RX ORDER — INSULIN GLARGINE 100 [IU]/ML
15 INJECTION, SOLUTION SUBCUTANEOUS AT BEDTIME
Refills: 0 | Status: DISCONTINUED | OUTPATIENT
Start: 2022-06-30 | End: 2022-06-30

## 2022-06-30 RX ORDER — SODIUM CHLORIDE 9 MG/ML
1000 INJECTION, SOLUTION INTRAVENOUS
Refills: 0 | Status: DISCONTINUED | OUTPATIENT
Start: 2022-06-30 | End: 2022-07-01

## 2022-06-30 RX ORDER — OLANZAPINE 15 MG/1
5 TABLET, FILM COATED ORAL ONCE
Refills: 0 | Status: COMPLETED | OUTPATIENT
Start: 2022-06-30 | End: 2022-06-30

## 2022-06-30 RX ADMIN — Medication 1: at 12:28

## 2022-06-30 RX ADMIN — Medication 1: at 17:29

## 2022-06-30 RX ADMIN — Medication 0.1 MILLIGRAM(S): at 05:37

## 2022-06-30 RX ADMIN — PANTOPRAZOLE SODIUM 40 MILLIGRAM(S): 20 TABLET, DELAYED RELEASE ORAL at 05:37

## 2022-06-30 RX ADMIN — AMPICILLIN SODIUM AND SULBACTAM SODIUM 200 GRAM(S): 250; 125 INJECTION, POWDER, FOR SUSPENSION INTRAMUSCULAR; INTRAVENOUS at 20:42

## 2022-06-30 RX ADMIN — OLANZAPINE 5 MILLIGRAM(S): 15 TABLET, FILM COATED ORAL at 03:04

## 2022-06-30 RX ADMIN — Medication 50 MILLIGRAM(S): at 05:37

## 2022-06-30 RX ADMIN — HEPARIN SODIUM 5000 UNIT(S): 5000 INJECTION INTRAVENOUS; SUBCUTANEOUS at 17:29

## 2022-06-30 RX ADMIN — CHLORHEXIDINE GLUCONATE 1 APPLICATION(S): 213 SOLUTION TOPICAL at 16:18

## 2022-06-30 RX ADMIN — Medication 2: at 08:47

## 2022-06-30 RX ADMIN — SODIUM CHLORIDE 75 MILLILITER(S): 9 INJECTION, SOLUTION INTRAVENOUS at 17:29

## 2022-06-30 RX ADMIN — HEPARIN SODIUM 5000 UNIT(S): 5000 INJECTION INTRAVENOUS; SUBCUTANEOUS at 05:37

## 2022-06-30 RX ADMIN — Medication 5 MILLIGRAM(S): at 16:18

## 2022-06-30 RX ADMIN — INSULIN GLARGINE 8 UNIT(S): 100 INJECTION, SOLUTION SUBCUTANEOUS at 22:14

## 2022-06-30 RX ADMIN — Medication 0: at 22:14

## 2022-06-30 RX ADMIN — Medication 15 MG/HR: at 02:45

## 2022-06-30 NOTE — BH CONSULTATION LIAISON ASSESSMENT NOTE - NSBHCHARTREVIEWVS_PSY_A_CORE FT
Vital Signs Last 24 Hrs  T(C): 36.9 (30 Jun 2022 12:09), Max: 36.9 (29 Jun 2022 23:55)  T(F): 98.4 (30 Jun 2022 12:09), Max: 98.4 (29 Jun 2022 23:55)  HR: 86 (30 Jun 2022 14:51) (18 - 132)  BP: 150/65 (30 Jun 2022 14:51) (119/60 - 152/66)  BP(mean): --  RR: 18 (30 Jun 2022 12:09) (18 - 18)  SpO2: 92% (30 Jun 2022 12:09) (90% - 96%)

## 2022-06-30 NOTE — PROGRESS NOTE ADULT - SUBJECTIVE AND OBJECTIVE BOX
Patient is a 71y old  Female who presents with a chief complaint of SIRS (30 Jun 2022 13:06)      INTERVAL HPI/OVERNIGHT EVENTS: Patient seen and examined at bedside. Overnight, patient required cardizem gtt up to 15cc/hr for afib w/rvr. Also required Zyprexa for agitation. This am, patient minimally responsive with ? nonreactive pupils, code stroke activated. CT Head showed no acute infarct. Unable to obtain ROS 2/2 patient's mental status.    MEDICATIONS  (STANDING):  ampicillin/sulbactam  IVPB 3 Gram(s) IV Intermittent every 24 hours  ampicillin/sulbactam  IVPB      aspirin enteric coated 81 milliGRAM(s) Oral daily  atorvastatin 40 milliGRAM(s) Oral at bedtime  chlorhexidine 4% Liquid 1 Application(s) Topical <User Schedule>  cloNIDine 0.1 milliGRAM(s) Oral two times a day  clopidogrel Tablet 75 milliGRAM(s) Oral daily  dextrose 5%. 1000 milliLiter(s) (50 mL/Hr) IV Continuous <Continuous>  dextrose 50% Injectable 25 Gram(s) IV Push once  epoetin fawad-epbx (RETACRIT) Injectable 68645 Unit(s) IV Push <User Schedule>  glucagon  Injectable 1 milliGRAM(s) IntraMuscular once  heparin   Injectable 5000 Unit(s) SubCutaneous every 12 hours  imipramine 50 milliGRAM(s) Oral daily  insulin glargine Injectable (LANTUS) 15 Unit(s) SubCutaneous at bedtime  insulin lispro (ADMELOG) corrective regimen sliding scale   SubCutaneous three times a day before meals  insulin lispro (ADMELOG) corrective regimen sliding scale   SubCutaneous at bedtime  metoprolol tartrate 100 milliGRAM(s) Oral two times a day  pantoprazole    Tablet 40 milliGRAM(s) Oral before breakfast    MEDICATIONS  (PRN):  acetaminophen     Tablet .. 650 milliGRAM(s) Oral every 6 hours PRN Temp greater or equal to 38C (100.4F), Mild Pain (1 - 3)  aluminum hydroxide/magnesium hydroxide/simethicone Suspension 30 milliLiter(s) Oral every 4 hours PRN Dyspepsia  dextrose Oral Gel 15 Gram(s) Oral once PRN Blood Glucose LESS THAN 70 milliGRAM(s)/deciliter  melatonin 3 milliGRAM(s) Oral at bedtime PRN Insomnia      Allergies    latex (Unknown)  No Known Drug Allergies    Intolerances        REVIEW OF SYSTEMS:  Unable to assess secondary to mental status    Vital Signs Last 24 Hrs  T(C): 36.9 (30 Jun 2022 12:09), Max: 36.9 (29 Jun 2022 23:55)  T(F): 98.4 (30 Jun 2022 12:09), Max: 98.4 (29 Jun 2022 23:55)  HR: 82 (30 Jun 2022 12:09) (18 - 132)  BP: 152/66 (30 Jun 2022 12:09) (119/60 - 152/66)  BP(mean): --  RR: 18 (30 Jun 2022 12:09) (18 - 18)  SpO2: 92% (30 Jun 2022 12:09) (90% - 96%)    PHYSICAL EXAM:  GENERAL: Frail, awake, NAD  HEAD:  Atraumatic, Normocephalic  EYES: Conjunctiva and sclera clear, no discharge  NECK: Supple, no JVD  CHEST/LUNG: Air entry bilaterally  HEART: Tachycardia, no rubs or gallops  ABDOMEN: Soft, Nontender, Nondistended  EXTREMITIES:  No clubbing, cyanosis or edema  SKIN: Right 1st toe amputation, stitches right thigh site  NERVOUS SYSTEM:  A&Ox1, pupils appearing non reactive bilaterally    LABS:                        8.8    13.73 )-----------( 323      ( 30 Jun 2022 08:45 )             27.6     CBC Full  -  ( 30 Jun 2022 08:45 )  WBC Count : 13.73 K/uL  Hemoglobin : 8.8 g/dL  Hematocrit : 27.6 %  Platelet Count - Automated : 323 K/uL  Mean Cell Volume : 91.1 fl  Mean Cell Hemoglobin : 29.0 pg  Mean Cell Hemoglobin Concentration : 31.9 gm/dL  Auto Neutrophil # : 11.63 K/uL  Auto Lymphocyte # : 0.82 K/uL  Auto Monocyte # : 0.95 K/uL  Auto Eosinophil # : 0.09 K/uL  Auto Basophil # : 0.03 K/uL  Auto Neutrophil % : 84.7 %  Auto Lymphocyte % : 6.0 %  Auto Monocyte % : 6.9 %  Auto Eosinophil % : 0.7 %  Auto Basophil % : 0.2 %    30 Jun 2022 08:45    134    |  96     |  29     ----------------------------<  222    4.1     |  28     |  3.30     Ca    9.4        30 Jun 2022 08:45    TPro  6.6    /  Alb  2.4    /  TBili  0.5    /  DBili  x      /  AST  19     /  ALT  13     /  AlkPhos  112    30 Jun 2022 08:45    PT/INR - ( 30 Jun 2022 08:45 )   PT: 13.5 sec;   INR: 1.15 ratio         PTT - ( 30 Jun 2022 08:45 )  PTT:29.7 sec    CAPILLARY BLOOD GLUCOSE  249 (30 Jun 2022 08:15)      POCT Blood Glucose.: 163 mg/dL (30 Jun 2022 12:05)  POCT Blood Glucose.: 249 mg/dL (30 Jun 2022 08:01)  POCT Blood Glucose.: 174 mg/dL (29 Jun 2022 21:28)  POCT Blood Glucose.: 238 mg/dL (29 Jun 2022 16:59)        Culture - Blood (collected 06-28-22 @ 23:58)  Source: .Blood Blood-Peripheral  Preliminary Report (06-30-22 @ 04:02):    No growth to date.    Culture - Blood (collected 06-28-22 @ 23:53)  Source: .Blood Blood-Peripheral  Preliminary Report (06-30-22 @ 04:02):    No growth to date.    Culture - Blood (collected 06-27-22 @ 11:45)  Source: .Blood Blood-Peripheral  Preliminary Report (06-28-22 @ 17:02):    No growth to date.    Culture - Blood (collected 06-27-22 @ 11:40)  Source: .Blood Blood-Peripheral  Preliminary Report (06-28-22 @ 17:02):    No growth to date.    Culture - Tissue with Gram Stain (collected 06-23-22 @ 15:35)  Source: .Tissue Other, right hallux bone culture  Gram Stain (06-24-22 @ 04:37):    Rare polymorphonuclear leukocytes seen per low power field    Few Gram positive cocci in pairs seen per oil power field  Final Report (06-28-22 @ 13:56):    Few Staphylococcus aureus    Rare Enterococcus faecalis  Organism: Staphylococcus aureus  Enterococcus faecalis (06-28-22 @ 13:56)  Organism: Enterococcus faecalis (06-28-22 @ 13:56)      -  Ampicillin: S <=2 Predicts results to ampicillin/sulbactam, amoxacillin-clavulanate and  piperacillin-tazobactam.      -  Tetra/Doxy: R >8      -  Vancomycin: S 2      Method Type: SONIA  Organism: Staphylococcus aureus (06-28-22 @ 13:56)      -  Ampicillin/Sulbactam: S <=8/4      -  Cefazolin: S <=4      -  Clindamycin: R <=0.25 This isolate is presumed to be clindamycin resistant based on detection of inducible resistance. Clindamycin may still be effective in some patients.      -  Erythromycin: R >4      -  Gentamicin: S <=1 Should not be used as monotherapy      -  Oxacillin: S <=0.25 Oxacillin predicts susceptibility for dicloxacillin, methicillin, and nafcillin      -  Penicillin: R >8      -  Rifampin: S <=1 Should not be used as monotherapy      -  Tetra/Doxy: S <=1      -  Trimethoprim/Sulfamethoxazole: S <=0.5/9.5      -  Vancomycin: S 1      Method Type: SONIA        RADIOLOGY & ADDITIONAL TESTS:    Personally reviewed.     Consultant(s) Notes Reviewed:  [x] YES  [ ] NO

## 2022-06-30 NOTE — BH CONSULTATION LIAISON ASSESSMENT NOTE - NSBHCHARTREVIEWLAB_PSY_A_CORE FT
8.8    13.73 )-----------( 323      ( 30 Jun 2022 08:45 )             27.6   06-30    134<L>  |  96  |  29<H>  ----------------------------<  222<H>  4.1   |  28  |  3.30<H>    Ca    9.4      30 Jun 2022 08:45  Phos  2.3     06-29  Mg     2.6     06-29    TPro  6.6  /  Alb  2.4<L>  /  TBili  0.5  /  DBili  x   /  AST  19  /  ALT  13  /  AlkPhos  112  06-30

## 2022-06-30 NOTE — STROKE CODE NOTE - NIH STROKE SCALE: 1A. LEVEL OF CONSCIOUSNESS, QM
(1) Not alert; but arousable by minor stimulation to obey, answer, or respond (2) Not alert; requires repeated stimulation to attend, or is obtunded and requires strong or painful stimulation to make movements (not stereotyped)

## 2022-06-30 NOTE — PROGRESS NOTE ADULT - ASSESSMENT
Patient is a 71 year old female with PMH of A.fib rate controlled not on AC for hx of multiple falls, T2DM, HTN, HLD, ESRD on HD, CHF, s/p CABG brought in by EMS for generalized weakness at home. Patient states that she was doing well when in the afternoon she tried to get up from her couch and felt weak in the LE and fell back in her couch. Denies any hx of head trauma or loss of consciousness. Denies any weakness or numbness. Denies any chest pain, SOB or palpitations. No fever, cough or chills. No hx of recent travel or sick contacts. At the time of EMS arrival patient was found to have POCBS of 50. EMS gave dextrose and on arrival to the ED patient blood sugar was found to be in 30's with hypothermia of 94.9.    ED course:   Vitals:   BP: 176/85  HR:76  Temp:94.2  RR:18, O2:96% on RA   Significant Labs:   CBC: WBC:16.82 Hb:13.2 , Platlets: 260, Neutro:89   CMP: Lytes: WNL, BUN/Creatinine:48/4.8, Glucose:134 , Alkaline Phos:128, AST, 41, eGFR: 9, HsTrop: 275.9, ProBNP: 008864, Lactate: 2  UA; negative  CXR: Heart and lung appear normal (self read)  CT BRAIN: No acute intracranial bleeding. Central volume loss, chronic microvascular ischemic changes, and chronic bilateral lacunar infarctions.  CT CERVICAL SPINE: No fracture. Grade 1 C4-C5 anterolisthesis. C6-C7 disc degeneration. Bilateral pleural effusions and interlobular septal thickening due to   interstitial edema.  EKG: NSR, left axis deviation, HR 71,   QT/QTc: 426/462  Patient received: Ceftriaxone 1 g x1, Dextrose 5% + NS 1L x1, Dextrose 50% IV X1, heating blanket, 2L NC   (16 Jun 2022 01:19)      rrt called transfer to micu     esrd on hd plan for hd as order        ANEMIA PLAN:  Anemia of chronic disease:  We will continue epo  aiming for a HCT of 32-36 %.   We will monitor Iron stores, B12 and RBC folate .    BP monitoring,continue current antihypertensive meds, low salt diet  metoprolol tartrate 100 milliGRAM(s) Oral every 12 hours  cloNIDine 0.1 milliGRAM(s) Oral two times a day    f/u  blood and urine cx,serial lactate levels,monitor vitals closley,ivfs hydration,monitor urine output and renal profile,iv abx  cefTRIAXone   IVPB 2000 milliGRAM(s) IV Intermittent every 24 hours      dvt heparin   Injectable 5000 Unit(s) SubCutaneous every 12 hours

## 2022-06-30 NOTE — PROGRESS NOTE ADULT - PROBLEM SELECTOR PLAN 1
increase lantus 15 units qhs  cont low dose admelog corrective scale coverage qac/qhs  cont cons cho diet  goal bg 100-180 in hosp setting

## 2022-06-30 NOTE — PROVIDER CONTACT NOTE (MEDICATION) - BACKGROUND
Admitting dx: hypoglycemia  Code stroke called earlier today, pt NPO, failed bedside dysphagia screen   Pt was in rapid a.fib now SR

## 2022-06-30 NOTE — PROVIDER CONTACT NOTE (CRITICAL VALUE NOTIFICATION) - BACKGROUND
Admitting dx: hypoglycemia  code stroke this morning, cardiac enzymes ordered
Pt on ACHS blood glucose regimen with coverage. Ranging 100's to 200's last few days. With elevated ranges for lunch (300's) and dinner (400's)  today. Endocrine and Diabetic Np following.

## 2022-06-30 NOTE — BH CONSULTATION LIAISON ASSESSMENT NOTE - CURRENT MEDICATION
MEDICATIONS  (STANDING):  ampicillin/sulbactam  IVPB 3 Gram(s) IV Intermittent every 24 hours  ampicillin/sulbactam  IVPB      aspirin enteric coated 81 milliGRAM(s) Oral daily  atorvastatin 40 milliGRAM(s) Oral at bedtime  chlorhexidine 4% Liquid 1 Application(s) Topical <User Schedule>  cloNIDine 0.1 milliGRAM(s) Oral two times a day  clopidogrel Tablet 75 milliGRAM(s) Oral daily  dextrose 5% + sodium chloride 0.9%. 1000 milliLiter(s) (75 mL/Hr) IV Continuous <Continuous>  dextrose 5%. 1000 milliLiter(s) (50 mL/Hr) IV Continuous <Continuous>  dextrose 50% Injectable 25 Gram(s) IV Push once  epoetin fawad-epbx (RETACRIT) Injectable 95114 Unit(s) IV Push <User Schedule>  glucagon  Injectable 1 milliGRAM(s) IntraMuscular once  heparin   Injectable 5000 Unit(s) SubCutaneous every 12 hours  imipramine 50 milliGRAM(s) Oral daily  insulin glargine Injectable (LANTUS) 8 Unit(s) SubCutaneous at bedtime  insulin lispro (ADMELOG) corrective regimen sliding scale   SubCutaneous three times a day before meals  insulin lispro (ADMELOG) corrective regimen sliding scale   SubCutaneous at bedtime  metoprolol tartrate Injectable 5 milliGRAM(s) IV Push every 6 hours  pantoprazole    Tablet 40 milliGRAM(s) Oral before breakfast    MEDICATIONS  (PRN):  acetaminophen     Tablet .. 650 milliGRAM(s) Oral every 6 hours PRN Temp greater or equal to 38C (100.4F), Mild Pain (1 - 3)  aluminum hydroxide/magnesium hydroxide/simethicone Suspension 30 milliLiter(s) Oral every 4 hours PRN Dyspepsia  dextrose Oral Gel 15 Gram(s) Oral once PRN Blood Glucose LESS THAN 70 milliGRAM(s)/deciliter  melatonin 3 milliGRAM(s) Oral at bedtime PRN Insomnia

## 2022-06-30 NOTE — PROVIDER CONTACT NOTE (CHANGE IN STATUS NOTIFICATION) - BACKGROUND
signed Diltiazem drip ordered yesterday 6/29 for rapid a.fib  Pt now in SR, HR 69 on tele monitor  Medication regimen includes PO clonidine & PO metoprolol

## 2022-06-30 NOTE — PROGRESS NOTE ADULT - SUBJECTIVE AND OBJECTIVE BOX
CAPILLARY BLOOD GLUCOSE  249 (30 Jun 2022 08:15)      POCT Blood Glucose.: 249 mg/dL (30 Jun 2022 08:01)  POCT Blood Glucose.: 174 mg/dL (29 Jun 2022 21:28)  POCT Blood Glucose.: 238 mg/dL (29 Jun 2022 16:59)  POCT Blood Glucose.: 155 mg/dL (29 Jun 2022 13:04)  POCT Blood Glucose.: 200 mg/dL (29 Jun 2022 11:50)      Vital Signs Last 24 Hrs  T(C): 36.9 (30 Jun 2022 05:08), Max: 36.9 (29 Jun 2022 23:55)  T(F): 98.4 (30 Jun 2022 05:08), Max: 98.4 (29 Jun 2022 23:55)  HR: 77 (30 Jun 2022 08:04) (18 - 132)  BP: 131/62 (30 Jun 2022 08:04) (119/60 - 149/73)  BP(mean): --  RR: 18 (30 Jun 2022 05:08) (18 - 18)  SpO2: 92% (30 Jun 2022 05:08) (90% - 96%)    Respiratory: CTA B/L  CV: RRR, S1S2, no murmurs, rubs or gallops  Abdominal: Soft, NT, ND +BS, Last BM  Extremities: le foot dsg intact     06-29    134<L>  |  92<L>  |  50<H>  ----------------------------<  224<H>  4.1   |  31  |  4.80<H>    Ca    9.6      29 Jun 2022 07:15  Phos  2.3     06-29  Mg     2.6     06-29    TPro  7.0  /  Alb  2.4<L>  /  TBili  0.5  /  DBili  x   /  AST  26  /  ALT  11<L>  /  AlkPhos  121<H>  06-29      atorvastatin 40 milliGRAM(s) Oral at bedtime  dextrose 50% Injectable 25 Gram(s) IV Push once  dextrose Oral Gel 15 Gram(s) Oral once PRN  glucagon  Injectable 1 milliGRAM(s) IntraMuscular once  insulin glargine Injectable (LANTUS) 7 Unit(s) SubCutaneous at bedtime  insulin lispro (ADMELOG) corrective regimen sliding scale   SubCutaneous three times a day before meals  insulin lispro (ADMELOG) corrective regimen sliding scale   SubCutaneous at bedtime

## 2022-06-30 NOTE — BH CONSULTATION LIAISON ASSESSMENT NOTE - NS ED B[facility] REVIEW OF ED CHART VITAL SIGNS REVIEWED
Denies known Latex allergy or symptoms of Latex sensitivity. Medications verified with patient. See med tab. Tobacco history verified.     Health Maintenance Due   Topic Date Due   â¢ Shingles Vaccine (1 of 2) 02/23/2005   â¢ Lung Cancer Screening  02/23/2010 Yes

## 2022-06-30 NOTE — PROVIDER CONTACT NOTE (CRITICAL VALUE NOTIFICATION) - ASSESSMENT
Pt A&Ox1. Pt denies CP/CD or SOB. No acute distress
Pt AAOx4 able to verbalize needs, VSS no s/s acute distress.

## 2022-06-30 NOTE — PROVIDER CONTACT NOTE (CHANGE IN STATUS NOTIFICATION) - SITUATION
HR 69, SR on tele monitor, diltiazem drip infusing @ 15mL/hr
Pt hypoxic on room air, SpO2 85%
Pt in rapid a.fib
Pt agitated, restless, combative. Pt attempting to get OOB

## 2022-06-30 NOTE — PROVIDER CONTACT NOTE (CRITICAL VALUE NOTIFICATION) - ACTION/TREATMENT ORDERED:
No new orders given
CCMD aware, BG already known from FS and treated with d50
Dr. Guthrie made aware and said will notify Dr. Armenta
No change in plan
Give Insulin coverage as currently prescribed. Next glucose check in AM on ACHS schedule.

## 2022-06-30 NOTE — PROGRESS NOTE ADULT - ASSESSMENT
70 yo woman with Hx ERSD, PVD, CAD, CABG admitted 6/16 with R hallux gangrene requiring R fem-pop bypass and partial ray amputation.  Course complicated by   episode of junctional bradycardia (likely med related), resulting in cardiogenic shock and encephalopathy with transfer to ICU, later resolved and transferred to  telemetry, now Afib with RVR.      Problem/Plan - 1:  ·  Problem: Hypoglycemia.  ·  Plan: - Patient presented with c/o generalized weakness and fall and was found to have blood sugar in 30's  - Patient with hx of T2DM, on Lantus 40 units qhs not on any oral hypoglycemics per outpatient pharmacy   - Accu checks  - Increased Lantus to 15U at bedtime for tighter control  - continue ISS        Problem/Plan - 2:  ·  Problem: SIRS (systemic inflammatory response syndrome). probably 2/2 to Rt toe infection  ·  Plan: -- CXR: no clear evidence of PNA   - Hx of L medial malleolus growing klebsiella oxytoca/raoutella oxytoca, E. coli, MSSA. Recent blood cultures negative.  - S/P R fem-BK pop w/PTFE POD# 5, S/P Partial ray amputation of R foot  POD #3  - reactive leucocytosis.  - Bone culture growing MSSA  - Changed to Unasyn per ID recs, to continue until July 26  - Requested IR PICC line  - ID recs appreciated    Problem/Plan - 3:  ·  Problem: Pleural effusion.   ·  Plan: - No Hx of pulmonary disease  - Patient does not c/o cough, fever or chills  - CT Cervical shows Bilateral pleural effusions and interlobular septal thickening due to interstitial edema.  - ID Dr. Tsai, consult appreciated      Problem/Plan - 4:  ·  Problem: HFrEF (heart failure with reduced ejection fraction).   ·  Plan: - patient with hx of HFrEF  - last ECHO in 04/22 shows EF of 45%, moderate MR  - Admission labs show ProBNP of 864177  - Likely elevated in the setting of ESRD  - Consult Dr. James, appreciate recs   - Avoid excessive fluids.     Problem/Plan - 5:  ·  Problem: Elevated troponin.   ·  Plan: - Admission labs show elevated Troponin of 275.9  - Patient denies any chest pain, sob or palpitations  - EKG shows NSR with left axis deviation with non specific ST and T waves changes  - elevated troponin likely due to ESRD     Problem/Plan - 6:  ·  Problem: ESRD on hemodialysis.   ·  Plan: - Patient with hx of ESRD  - On HD TTS schedule  - admission eGFR 9  - Follow Dr. Edwards      Problem/Plan - 7:  ·  Problem: T2DM (type 2 diabetes mellitus).   ·  Plan: - Chronic stable  - home dose Lantus 40 qhs  - plan as above     Problem/Plan - 8:  ·  Problem: Atrial fibrillation.   ·  Plan: - Patient with hx of P A.Fib  - Metoprolol 100 q12h  - Not on any AC because of hx of multiple falls   - EKG shows NSR with left axis deviation with non specific ST and T waves changes.     Problem/Plan - 9:  ·  Problem: Benign essential HTN.   ·  Plan: - Chronic stable  - Continue clonidine 0.1mg BID  - Dash/Renal Diet  - continue to monitor routine hemodynamics.     Problem/Plan - 10:  ·  Problem: HLD (hyperlipidemia).   ·  Plan; - Chronic stable  - On atorvastatin 40mg at home - will continue  - Dash/Renal diet.     Problem/Plan - 11:  ·  Problem: Depression, major.   ·  Plan: - Chronic stable  - Continue imipramine 50qhs.     Problem/Plan - 12:  Junctional bradycardia resulting in cardiogenic shock  - Transferred to ICU, now resolved  - Clonidine for htn  - Lopressor 100mg q12h per cardio recs     Problem/Plan - 13:  ·  Problem: Altered Mental Status  ·  Plan: - A&Ox1 this am, stroke code activated. CT head showed no acute infarct. Pt required Zyprexa overnight, altered mental status likely related to sedative effect of medication.     Problem/Plan - 14:  ·  Problem: Need for prophylactic measure.   ·  Plan: - DVT Prophylaxis: Heparin 5000 units q12. 72 yo woman with Hx ERSD, PVD, CAD, CABG admitted 6/16 with R hallux gangrene requiring R fem-pop bypass and partial ray amputation.  Course complicated by   episode of junctional bradycardia (likely med related), resulting in cardiogenic shock and encephalopathy with transfer to ICU, later resolved and transferred to  telemetry, now Afib with RVR.      Problem/Plan - 1:  ·  Problem: Hypoglycemia.  ·  Plan: - Patient presented with c/o generalized weakness and fall and was found to have blood sugar in 30's  - Patient with hx of T2DM, on Lantus 40 units qhs not on any oral hypoglycemics per outpatient pharmacy   - Accu checks  - Increased Lantus to 15U at bedtime for tighter control  - continue ISS        Problem/Plan - 2:  ·  Problem: SIRS (systemic inflammatory response syndrome). probably 2/2 to Rt toe infection  ·  Plan: -- CXR: no clear evidence of PNA   - Hx of L medial malleolus growing klebsiella oxytoca/raoutella oxytoca, E. coli, MSSA. Recent blood cultures negative.  - S/P R fem-BK pop w/PTFE POD# 5, S/P Partial ray amputation of R foot  POD #3  - reactive leucocytosis.  - Bone culture growing MSSA  - Changed to Unasyn per ID recs, to continue until July 26  - Requested IR PICC line  - ID recs appreciated    Problem/Plan - 3:  ·  Problem: Pleural effusion.   ·  Plan: - No Hx of pulmonary disease  - Patient does not c/o cough, fever or chills  - CT Cervical shows Bilateral pleural effusions and interlobular septal thickening due to interstitial edema.  - ID Dr. Tsai, consult appreciated      Problem/Plan - 4:  ·  Problem: HFrEF (heart failure with reduced ejection fraction).   ·  Plan: - patient with hx of HFrEF  - last ECHO in 04/22 shows EF of 45%, moderate MR  - Admission labs show ProBNP of 545879  - Likely elevated in the setting of ESRD  - Consult Dr. James, appreciate recs   - Avoid excessive fluids.     Problem/Plan - 5:  ·  Problem: Elevated troponin.   ·  Plan: - Admission labs show elevated Troponin of 275.9  - Patient denies any chest pain, sob or palpitations  - EKG shows NSR with left axis deviation with non specific ST and T waves changes  - elevated troponin likely due to ESRD     Problem/Plan - 6:  ·  Problem: ESRD on hemodialysis.   ·  Plan: - Patient with hx of ESRD  - On HD TTS schedule  - admission eGFR 9  - Follow Dr. Edwards      Problem/Plan - 7:  ·  Problem: T2DM (type 2 diabetes mellitus).   ·  Plan: - Chronic stable  - home dose Lantus 40 qhs  - plan as above     Problem/Plan - 8:  ·  Problem: Atrial fibrillation.   ·  Plan: - Patient with hx of P A.Fib  - Metoprolol 100 q12h  - Not on any AC because of hx of multiple falls   - EKG shows NSR with left axis deviation with non specific ST and T waves changes.     Problem/Plan - 9:  ·  Problem: Benign essential HTN.   ·  Plan: - Chronic stable  - Continue clonidine 0.1mg BID  - Dash/Renal Diet  - continue to monitor routine hemodynamics.     Problem/Plan - 10:  ·  Problem: HLD (hyperlipidemia).   ·  Plan; - Chronic stable  - On atorvastatin 40mg at home - will continue  - Dash/Renal diet.     Problem/Plan - 11:  ·  Problem: Depression, major.   ·  Plan: - Chronic stable  - Continue imipramine 50qhs.     Problem/Plan - 12:  Junctional bradycardia resulting in cardiogenic shock  - Transferred to ICU, now resolved  - Clonidine for htn  - Lopressor 100mg q12h per cardio recs     Problem/Plan - 13:  ·  Problem: Altered Mental Status  ·  Plan: - A&Ox1 this am, stroke code activated. CT head showed no acute infarct. Pt required Zyprexa overnight, altered mental status likely related to sedative effect of medication.  - F/u neuro and psych consults     Problem/Plan - 14:  ·  Problem: Need for prophylactic measure.   ·  Plan: - DVT Prophylaxis: Heparin 5000 units q12.

## 2022-06-30 NOTE — PROGRESS NOTE ADULT - SUBJECTIVE AND OBJECTIVE BOX
MEDICATIONS  (STANDING):  ampicillin/sulbactam  IVPB 3 Gram(s) IV Intermittent every 24 hours  ampicillin/sulbactam  IVPB      aspirin enteric coated 81 milliGRAM(s) Oral daily  atorvastatin 40 milliGRAM(s) Oral at bedtime  chlorhexidine 4% Liquid 1 Application(s) Topical <User Schedule>  cloNIDine 0.1 milliGRAM(s) Oral two times a day  clopidogrel Tablet 75 milliGRAM(s) Oral daily  dextrose 5%. 1000 milliLiter(s) (50 mL/Hr) IV Continuous <Continuous>  dextrose 50% Injectable 25 Gram(s) IV Push once  epoetin fawad-epbx (RETACRIT) Injectable 83008 Unit(s) IV Push <User Schedule>  glucagon  Injectable 1 milliGRAM(s) IntraMuscular once  heparin   Injectable 5000 Unit(s) SubCutaneous every 12 hours  imipramine 50 milliGRAM(s) Oral daily  insulin glargine Injectable (LANTUS) 15 Unit(s) SubCutaneous at bedtime  insulin lispro (ADMELOG) corrective regimen sliding scale   SubCutaneous three times a day before meals  insulin lispro (ADMELOG) corrective regimen sliding scale   SubCutaneous at bedtime  metoprolol tartrate 100 milliGRAM(s) Oral two times a day  pantoprazole    Tablet 40 milliGRAM(s) Oral before breakfast    MEDICATIONS  (PRN):  acetaminophen     Tablet .. 650 milliGRAM(s) Oral every 6 hours PRN Temp greater or equal to 38C (100.4F), Mild Pain (1 - 3)  aluminum hydroxide/magnesium hydroxide/simethicone Suspension 30 milliLiter(s) Oral every 4 hours PRN Dyspepsia  dextrose Oral Gel 15 Gram(s) Oral once PRN Blood Glucose LESS THAN 70 milliGRAM(s)/deciliter  melatonin 3 milliGRAM(s) Oral at bedtime PRN Insomnia  71 year old Female seen at bedside. Pt downgraded from MICU. Pt  S/p Right hallux amputation (DOS: 6/23/22). Pt doing well, feeling much better today.   Allergies    ICU Vital Signs Last 24 Hrs  T(C): 36.9 (30 Jun 2022 12:09), Max: 36.9 (29 Jun 2022 23:55)  T(F): 98.4 (30 Jun 2022 12:09), Max: 98.4 (29 Jun 2022 23:55)  HR: 82 (30 Jun 2022 12:09) (18 - 132)  BP: 152/66 (30 Jun 2022 12:09) (119/60 - 152/66)  BP(mean): --  ABP: --  ABP(mean): --  RR: 18 (30 Jun 2022 12:09) (18 - 18)  SpO2: 92% (30 Jun 2022 12:09) (90% - 96%)    CBC Full  -  ( 30 Jun 2022 08:45 )  WBC Count : 13.73 K/uL  RBC Count : 3.03 M/uL  Hemoglobin : 8.8 g/dL  Hematocrit : 27.6 %  Platelet Count - Automated : 323 K/uL  Mean Cell Volume : 91.1 fl  Mean Cell Hemoglobin : 29.0 pg  Mean Cell Hemoglobin Concentration : 31.9 gm/dL  Auto Neutrophil # : 11.63 K/uL  Auto Lymphocyte # : 0.82 K/uL  Auto Monocyte # : 0.95 K/uL  Auto Eosinophil # : 0.09 K/uL  Auto Basophil # : 0.03 K/uL  Auto Neutrophil % : 84.7 %  Auto Lymphocyte % : 6.0 %  Auto Monocyte % : 6.9 %  Auto Eosinophil % : 0.7 %  Auto Basophil % : 0.2 %      Allergies    latex (Unknown)  No Known Drug Allergies    Intolerances      PHYSICAL EXAM:  Vascular: Right DP & PT dopplerable.  Capillary refill (CFT) 3 seconds. Digital hair absent bilaterally.   Neurological: Light touch sensation diminished bilaterally.  Musculoskeletal: s/p right hallux amputation. strength in all quadrants bilaterally, AJ & STJ ROM absent b/l.   Dermatological: sutures to right hallux intact. No signs of wound dehiscence. No signs of infection.       Culture - Tissue with Gram Stain (collected 23 Jun 2022 15:35)  Source: .Tissue Other, right hallux bone culture  Gram Stain (24 Jun 2022 04:37):    Rare polymorphonuclear leukocytes seen per low power field    Few Gram positive cocci in pairs seen per oil power field        ACCESSION No:  [id number]  Patient:   SHILO KOHLER      Accession:                             [id number]    Collected Date/Time:                   6/23/2022 15:35 EDT  Received Date/Time:                    6/24/2022 07:44 EDT    Surgical Pathology Report - Auth (Verified)    Specimen(s) Submitted  1  Right hallux pathology  2  Right first metatarsal/clean margin pathology    Final Diagnosis  1. Right hallux  -Acute and chronic osteomyelitis.  -Gangrenous skin and soft tissue.    2. Right first metatarsal/clean margin:  -Minimal chronic osteomyelitis.    Verified by: Randall Rodriguez MD  (Electronic Signature)  Reported on: 06/27/22 12:18 EDT, [facility], [address] [place] [address]     72 y/o female seen at bedside. Stroke code called on the pt this morning. Pt resting in bed, a little disoriented. Unable to communicate clearly.     MEDICATIONS  (STANDING):  ampicillin/sulbactam  IVPB 3 Gram(s) IV Intermittent every 24 hours  ampicillin/sulbactam  IVPB      aspirin enteric coated 81 milliGRAM(s) Oral daily  atorvastatin 40 milliGRAM(s) Oral at bedtime  chlorhexidine 4% Liquid 1 Application(s) Topical <User Schedule>  cloNIDine 0.1 milliGRAM(s) Oral two times a day  clopidogrel Tablet 75 milliGRAM(s) Oral daily  dextrose 5%. 1000 milliLiter(s) (50 mL/Hr) IV Continuous <Continuous>  dextrose 50% Injectable 25 Gram(s) IV Push once  epoetin fawad-epbx (RETACRIT) Injectable 84195 Unit(s) IV Push <User Schedule>  glucagon  Injectable 1 milliGRAM(s) IntraMuscular once  heparin   Injectable 5000 Unit(s) SubCutaneous every 12 hours  imipramine 50 milliGRAM(s) Oral daily  insulin glargine Injectable (LANTUS) 15 Unit(s) SubCutaneous at bedtime  insulin lispro (ADMELOG) corrective regimen sliding scale   SubCutaneous three times a day before meals  insulin lispro (ADMELOG) corrective regimen sliding scale   SubCutaneous at bedtime  metoprolol tartrate 100 milliGRAM(s) Oral two times a day  pantoprazole    Tablet 40 milliGRAM(s) Oral before breakfast    MEDICATIONS  (PRN):  acetaminophen     Tablet .. 650 milliGRAM(s) Oral every 6 hours PRN Temp greater or equal to 38C (100.4F), Mild Pain (1 - 3)  aluminum hydroxide/magnesium hydroxide/simethicone Suspension 30 milliLiter(s) Oral every 4 hours PRN Dyspepsia  dextrose Oral Gel 15 Gram(s) Oral once PRN Blood Glucose LESS THAN 70 milliGRAM(s)/deciliter  melatonin 3 milliGRAM(s) Oral at bedtime PRN Insomnia  71 year old Female seen at bedside. Pt downgraded from MICU. Pt  S/p Right hallux amputation (DOS: 6/23/22). Pt doing well, feeling much better today.   Allergies    ICU Vital Signs Last 24 Hrs  T(C): 36.9 (30 Jun 2022 12:09), Max: 36.9 (29 Jun 2022 23:55)  T(F): 98.4 (30 Jun 2022 12:09), Max: 98.4 (29 Jun 2022 23:55)  HR: 82 (30 Jun 2022 12:09) (18 - 132)  BP: 152/66 (30 Jun 2022 12:09) (119/60 - 152/66)  BP(mean): --  ABP: --  ABP(mean): --  RR: 18 (30 Jun 2022 12:09) (18 - 18)  SpO2: 92% (30 Jun 2022 12:09) (90% - 96%)    CBC Full  -  ( 30 Jun 2022 08:45 )  WBC Count : 13.73 K/uL  RBC Count : 3.03 M/uL  Hemoglobin : 8.8 g/dL  Hematocrit : 27.6 %  Platelet Count - Automated : 323 K/uL  Mean Cell Volume : 91.1 fl  Mean Cell Hemoglobin : 29.0 pg  Mean Cell Hemoglobin Concentration : 31.9 gm/dL  Auto Neutrophil # : 11.63 K/uL  Auto Lymphocyte # : 0.82 K/uL  Auto Monocyte # : 0.95 K/uL  Auto Eosinophil # : 0.09 K/uL  Auto Basophil # : 0.03 K/uL  Auto Neutrophil % : 84.7 %  Auto Lymphocyte % : 6.0 %  Auto Monocyte % : 6.9 %  Auto Eosinophil % : 0.7 %  Auto Basophil % : 0.2 %      Allergies    latex (Unknown)  No Known Drug Allergies    Intolerances      PHYSICAL EXAM:  Vascular: Right DP & PT dopplerable.  Capillary refill (CFT) 3 seconds. Digital hair absent bilaterally.   Neurological: Light touch sensation diminished bilaterally.  Musculoskeletal: s/p right hallux amputation. strength in all quadrants bilaterally, AJ & STJ ROM absent b/l.   Dermatological: sutures to right hallux intact. No signs of wound dehiscence. No signs of infection.       Culture - Tissue with Gram Stain (collected 23 Jun 2022 15:35)  Source: .Tissue Other, right hallux bone culture  Gram Stain (24 Jun 2022 04:37):    Rare polymorphonuclear leukocytes seen per low power field    Few Gram positive cocci in pairs seen per oil power field        ACCESSION No:  [id number]  Patient:   SHILO KOHLER      Accession:                             [id number]    Collected Date/Time:                   6/23/2022 15:35 EDT  Received Date/Time:                    6/24/2022 07:44 EDT    Surgical Pathology Report - Auth (Verified)    Specimen(s) Submitted  1  Right hallux pathology  2  Right first metatarsal/clean margin pathology    Final Diagnosis  1. Right hallux  -Acute and chronic osteomyelitis.  -Gangrenous skin and soft tissue.    2. Right first metatarsal/clean margin:  -Minimal chronic osteomyelitis.    Verified by: Randall Rodriguez MD  (Electronic Signature)  Reported on: 06/27/22 12:18 EDT, [facility], [address] [place] [address]

## 2022-06-30 NOTE — PROGRESS NOTE ADULT - SUBJECTIVE AND OBJECTIVE BOX
Patient is a 71y Female with a known history of :  SIRS (systemic inflammatory response syndrome) [R65.10]    Hypoglycemia [E16.2]    Pleural effusion [J90]    CHF, acute [I50.9]    Chronic CHF [I50.9]    Elevated troponin [R77.8]    Atrial fibrillation [I48.91]    Benign essential HTN [I10]    HLD (hyperlipidemia) [E78.5]    Depression, major [F32.9]    Need for prophylactic measure [Z29.9]    ESRD on hemodialysis [N18.6]    T2DM (type 2 diabetes mellitus) [E11.9]    HFrEF (heart failure with reduced ejection fraction) [I50.20]    Gangrene of toe of right foot [I96]    Atherosclerotic PVD with ulceration [I70.209]    PVD (peripheral vascular disease) [I73.9]      HPI:  Patient is a 71 year old female with PMH of A.fib rate controlled not on AC for hx of multiple falls, T2DM, HTN, HLD, ESRD on HD, CHF, s/p CABG brought in by EMS for generalized weakness at home. Patient states that she was doing well when in the afternoon she tried to get up from her couch and felt weak in the LE and fell back in her couch. Denies any hx of head trauma or loss of consciousness. Denies any weakness or numbness. Denies any chest pain, SOB or palpitations. No fever, cough or chills. No hx of recent travel or sick contacts. At the time of EMS arrival patient was found to have POCBS of 50. EMS gave dextrose and on arrival to the ED patient blood sugar was found to be in 30's with hypothermia of 94.9.    ED course:   Vitals:   BP: 176/85  HR:76  Temp:94.2  RR:18, O2:96% on RA   Significant Labs:   CBC: WBC:16.82 Hb:13.2 , Platlets: 260, Neutro:89   CMP: Lytes: WNL, BUN/Creatinine:48/4.8, Glucose:134 , Alkaline Phos:128, AST, 41, eGFR: 9, HsTrop: 275.9, ProBNP: 590240, Lactate: 2  UA; negative  CXR: Heart and lung appear normal (self read)  CT BRAIN: No acute intracranial bleeding. Central volume loss, chronic microvascular ischemic changes, and chronic bilateral lacunar infarctions.  CT CERVICAL SPINE: No fracture. Grade 1 C4-C5 anterolisthesis. C6-C7 disc degeneration. Bilateral pleural effusions and interlobular septal thickening due to   interstitial edema.  EKG: NSR, left axis deviation, HR 71,   QT/QTc: 426/462  Patient received: Ceftriaxone 1 g x1, Dextrose 5% + NS 1L x1, Dextrose 50% IV X1, heating blanket, 2L NC   (16 Jun 2022 01:19)      REVIEW OF SYSTEMS:    CONSTITUTIONAL: No fever, weight loss, or fatigue  EYES: No eye pain, visual disturbances, or discharge  ENMT:  No difficulty hearing, tinnitus, vertigo; No sinus or throat pain  NECK: No pain or stiffness  BREASTS: No pain, masses, or nipple discharge  RESPIRATORY: No cough, wheezing, chills or hemoptysis; No shortness of breath  CARDIOVASCULAR: No chest pain, palpitations, dizziness, or leg swelling  GASTROINTESTINAL: No abdominal or epigastric pain. No nausea, vomiting, or hematemesis; No diarrhea or constipation. No melena or hematochezia.  GENITOURINARY: No dysuria, frequency, hematuria, or incontinence  NEUROLOGICAL: No headaches, memory loss, loss of strength, numbness, or tremors  SKIN: No itching, burning, rashes, or lesions   LYMPH NODES: No enlarged glands  ENDOCRINE: No heat or cold intolerance; No hair loss  MUSCULOSKELETAL: No joint pain or swelling; No muscle, back, or extremity pain  PSYCHIATRIC: No depression, anxiety, mood swings, or difficulty sleeping  HEME/LYMPH: No easy bruising, or bleeding gums  ALLERGY AND IMMUNOLOGIC: No hives or eczema    MEDICATIONS  (STANDING):  ampicillin/sulbactam  IVPB 3 Gram(s) IV Intermittent every 24 hours  ampicillin/sulbactam  IVPB      aspirin enteric coated 81 milliGRAM(s) Oral daily  atorvastatin 40 milliGRAM(s) Oral at bedtime  chlorhexidine 4% Liquid 1 Application(s) Topical <User Schedule>  cloNIDine 0.1 milliGRAM(s) Oral two times a day  clopidogrel Tablet 75 milliGRAM(s) Oral daily  dextrose 5%. 1000 milliLiter(s) (50 mL/Hr) IV Continuous <Continuous>  dextrose 50% Injectable 25 Gram(s) IV Push once  epoetin fawad-epbx (RETACRIT) Injectable 51489 Unit(s) IV Push <User Schedule>  glucagon  Injectable 1 milliGRAM(s) IntraMuscular once  heparin   Injectable 5000 Unit(s) SubCutaneous every 12 hours  imipramine 50 milliGRAM(s) Oral daily  insulin glargine Injectable (LANTUS) 7 Unit(s) SubCutaneous at bedtime  insulin lispro (ADMELOG) corrective regimen sliding scale   SubCutaneous three times a day before meals  insulin lispro (ADMELOG) corrective regimen sliding scale   SubCutaneous at bedtime  metoprolol tartrate 100 milliGRAM(s) Oral two times a day  pantoprazole    Tablet 40 milliGRAM(s) Oral before breakfast    MEDICATIONS  (PRN):  acetaminophen     Tablet .. 650 milliGRAM(s) Oral every 6 hours PRN Temp greater or equal to 38C (100.4F), Mild Pain (1 - 3)  aluminum hydroxide/magnesium hydroxide/simethicone Suspension 30 milliLiter(s) Oral every 4 hours PRN Dyspepsia  dextrose Oral Gel 15 Gram(s) Oral once PRN Blood Glucose LESS THAN 70 milliGRAM(s)/deciliter  melatonin 3 milliGRAM(s) Oral at bedtime PRN Insomnia      ALLERGIES: latex (Unknown)  No Known Drug Allergies      FAMILY HISTORY:  No pertinent family history in first degree relatives        PHYSICAL EXAMINATION:  -----------------------------  T(C): 36.9 (06-30-22 @ 05:08), Max: 36.9 (06-29-22 @ 23:55)  HR: 77 (06-30-22 @ 08:04) (18 - 132)  BP: 131/62 (06-30-22 @ 08:04) (119/60 - 149/73)  RR: 18 (06-30-22 @ 05:08) (18 - 18)  SpO2: 92% (06-30-22 @ 05:08) (90% - 96%)  Wt(kg): --    06-29 @ 07:01  -  06-30 @ 07:00  --------------------------------------------------------  IN:    Diltiazem: 10 mL    Diltiazem: 195 mL  Total IN: 205 mL    OUT:    Other (mL): 800 mL  Total OUT: 800 mL    Total NET: -595 mL            VITALS  T(C): 36.9 (06-30-22 @ 05:08), Max: 36.9 (06-29-22 @ 23:55)  HR: 77 (06-30-22 @ 08:04) (18 - 132)  BP: 131/62 (06-30-22 @ 08:04) (119/60 - 149/73)  RR: 18 (06-30-22 @ 05:08) (18 - 18)  SpO2: 92% (06-30-22 @ 05:08) (90% - 96%)    Constitutional: well developed, normal appearance, well groomed, well nourished, no deformities and no acute distress.   Eyes: the conjunctiva exhibited no abnormalities and the eyelids demonstrated no xanthelasmas.   HEENT: normal oral mucosa, no oral pallor and no oral cyanosis.   Neck: normal jugular venous A waves present, normal jugular venous V waves present and no jugular venous wilson A waves.   Pulmonary: no respiratory distress, normal respiratory rhythm and effort, no accessory muscle use and lungs were clear to auscultation bilaterally.   Cardiovascular: heart rate and rhythm were normal, normal S1 and S2 and no murmur, gallop, rub, heave or thrill are present.   Abdomen: soft, non-tender, no hepato-splenomegaly and no abdominal mass palpated.   Musculoskeletal: the gait could not be assessed..   Extremities: no clubbing of the fingernails, no localized cyanosis, no petechial hemorrhages and no ischemic changes.   Skin: normal skin color and pigmentation, no rash, no venous stasis, no skin lesions, no skin ulcer and no xanthoma was observed.   Psychiatric: oriented to person, place, and time, the affect was normal, the mood was normal and not feeling anxious.     LABS:   --------  06-29    134<L>  |  92<L>  |  50<H>  ----------------------------<  224<H>  4.1   |  31  |  4.80<H>    Ca    9.6      29 Jun 2022 07:15  Phos  2.3     06-29  Mg     2.6     06-29    TPro  7.0  /  Alb  2.4<L>  /  TBili  0.5  /  DBili  x   /  AST  26  /  ALT  11<L>  /  AlkPhos  121<H>  06-29                         8.9    13.58 )-----------( 378      ( 29 Jun 2022 07:15 )             28.8             Culture Results:   No growth to date. (06-28 @ 23:58)  Culture Results:   No growth to date. (06-28 @ 23:53)      RADIOLOGY:  -----------------    ECG:     ECHO:

## 2022-06-30 NOTE — PROGRESS NOTE ADULT - ASSESSMENT
pt with hypoglycemia  elevated troponin due to renal failure  ashd , s/p mi  s/p coronary stent  s/p cabg  chf-hfref  esrd - on hd  dm2  hypertension  paf -not on oac - fall risk  pvd - s/p stent  dyslipidemia   chf - compensated - recent coronary angiogram - patent graft -pt's cradiac status stable low  to intermediate risk for  pvd surgery and amputation of great  toe if needed  6/21  pt tolerated rt femoral -poplitael by-pass 6/21  cardiac status stable low risk for rt big toe amputation 6/23  pt tolerated amputation of left great toe 6/23  pt had bradycardia and hypotension- transferred to icu- had atropine and dopamine-metoprolol and cardizem dc 6/27  now sinus tachycardia and bp improved 6/28 pt is more alert today 6/29 - had atrial fib - rapid vr - on cardizem drip- now rsr - dc cardizem - increase lopressor-ct brain - r/o cva

## 2022-06-30 NOTE — PROGRESS NOTE ADULT - SUBJECTIVE AND OBJECTIVE BOX
[facility]  INFECTIOUS DISEASES   [address] [place] [address]  Tel: [phone]     Fax: [phone]  ======================================================  MD Zita Alejandra Kaushal, MD Cho, Michelle, MD   ======================================================    [id number]  SHILO KOHLER     Follow up: R foot wound/gangrene     Awake and alert, doing well, no complaint. No pain in leg.   Wound looks clean, healing no sign of ischemia.     PAST MEDICAL & SURGICAL HISTORY:  Diabetes mellitus II  HTN (hypertension)  h/o Anxiety attack  Depression  h/o Myocardial infarct 2007  CAD (coronary artery disease)  h/o Hepatitis A 1969  currently resolved, no symptoms  PAD (peripheral artery disease)  Murmur, cardiac  h/o Smoking  quitted 3/2012  CRF (chronic renal failure), unspecified stage  Dialysis patient  Anemia secondary to renal failure  HTN (hypertension)  coronary stent 2007  s/p Ovarian cyst removal  s/p surgical removal of benign Skin lesion epigastric area    Social Hx: no current smoking, ETOH or drugs     FAMILY HISTORY:  No pertinent family history in first degree relatives    Allergies  latex (Unknown)  No Known Drug Allergies    Antibiotics:  zosyn     REVIEW OF SYSTEMS:  CONSTITUTIONAL:  No Fever or chills  HEENT:  No diplopia or blurred vision.  No sore throat or runny nose.  CARDIOVASCULAR:  No chest pain or SOB.  RESPIRATORY:  No cough, shortness of breath, PND or orthopnea.  GASTROINTESTINAL:  No nausea, vomiting or diarrhea.  GENITOURINARY:  No dysuria, frequency or urgency. No Blood in urine  MUSCULOSKELETAL:  No more pain in leg   SKIN:  R foot wound better  NEUROLOGIC:  No paresthesias, fasciculations, seizures or weakness.  PSYCHIATRIC:  No disorder of thought or mood.  ENDOCRINE:  No heat or cold intolerance, polyuria or polydipsia.  HEMATOLOGICAL:  No easy bruising or bleeding.     Physical Exam:  Vital Signs Last 24 Hrs  T(C): 36.9 (30 Jun 2022 12:09), Max: 36.9 (29 Jun 2022 23:55)  T(F): 98.4 (30 Jun 2022 12:09), Max: 98.4 (29 Jun 2022 23:55)  HR: 82 (30 Jun 2022 12:09) (18 - 132)  BP: 152/66 (30 Jun 2022 12:09) (119/60 - 152/66)  BP(mean): --  RR: 18 (30 Jun 2022 12:09) (18 - 18)  SpO2: 92% (30 Jun 2022 12:09) (90% - 96%)  GEN: NAD  HEENT: normocephalic and atraumatic. EOMI. PERRL.    NECK: Supple.  No lymphadenopathy   LUNGS: Clear to auscultation.  HEART: Irregular rate and rhythm   ABDOMEN: Soft, nontender, and nondistended.  Positive bowel sounds.    : No CVA tenderness  EXTREMITIES: Now with 2 long wounds in medial side of leg after vascular surgery look clean  R foot dressed after amputation wound clean   NEUROLOGIC: grossly intact.  PSYCHIATRIC: Appropriate affect .  SKIN: No rash     Labs:                        8.8    13.73 )-----------( 323      ( 30 Jun 2022 08:45 )             27.6     06-30    134<L>  |  96  |  29<H>  ----------------------------<  222<H>  4.1   |  28  |  3.30<H>    Ca    9.4      30 Jun 2022 08:45  Phos  2.3     06-29  Mg     2.6     06-29    TPro  6.6  /  Alb  2.4<L>  /  TBili  0.5  /  DBili  x   /  AST  19  /  ALT  13  /  AlkPhos  112  06-30    Culture - Blood (collected 06-28-22 @ 23:58)  Source: .Blood Blood-Peripheral    Culture - Blood (collected 06-28-22 @ 23:53)  Source: .Blood Blood-Peripheral    Culture - Blood (collected 06-27-22 @ 11:45)  Source: .Blood Blood-Peripheral    Culture - Blood (collected 06-27-22 @ 11:40)  Source: .Blood Blood-Peripheral    Culture - Tissue with Gram Stain (collected 06-23-22 @ 15:35)  Source: .Tissue Other, right hallux bone culture  Gram Stain (06-24-22 @ 04:37):    Rare polymorphonuclear leukocytes seen per low power field    Few Gram positive cocci in pairs seen per oil power field  Final Report (06-28-22 @ 13:56):    Few Staphylococcus aureus    Rare Enterococcus faecalis  Organism: Staphylococcus aureus  Enterococcus faecalis (06-28-22 @ 13:56)  Organism: Enterococcus faecalis (06-28-22 @ 13:56)    Sensitivities:      -  Ampicillin: S <=2 Predicts results to ampicillin/sulbactam, amoxacillin-clavulanate and  piperacillin-tazobactam.      -  Tetra/Doxy: R >8      -  Vancomycin: S 2      Method Type: SONIA  Organism: Staphylococcus aureus (06-28-22 @ 13:56)    Sensitivities:      -  Ampicillin/Sulbactam: S <=8/4      -  Cefazolin: S <=4      -  Clindamycin: R <=0.25 This isolate is presumed to be clindamycin resistant based on detection of inducible resistance. Clindamycin may still be effective in some patients.      -  Erythromycin: R >4      -  Gentamicin: S <=1 Should not be used as monotherapy      -  Oxacillin: S <=0.25 Oxacillin predicts susceptibility for dicloxacillin, methicillin, and nafcillin      -  Penicillin: R >8      -  Rifampin: S <=1 Should not be used as monotherapy      -  Tetra/Doxy: S <=1      -  Trimethoprim/Sulfamethoxazole: S <=0.5/9.5      -  Vancomycin: S 1      Method Type: SONIA    WBC Count: 13.73 K/uL (06-30-22 @ 08:45)  WBC Count: 13.58 K/uL (06-29-22 @ 07:15)  WBC Count: 16.04 K/uL (06-28-22 @ 08:20)  WBC Count: 17.10 K/uL (06-27-22 @ 11:40)  WBC Count: 19.17 K/uL (06-27-22 @ 06:58)    Creatinine, Serum: 3.30 mg/dL (06-30-22 @ 08:45)  Creatinine, Serum: 4.80 mg/dL (06-29-22 @ 07:15)  Creatinine, Serum: 3.40 mg/dL (06-28-22 @ 08:20)  Creatinine, Serum: 6.30 mg/dL (06-27-22 @ 11:40)  Creatinine, Serum: 6.20 mg/dL (06-27-22 @ 06:58)    C-Reactive Protein, Serum: 19 mg/L (06-17-22 @ 10:38)    Sedimentation Rate, Erythrocyte: 13 mm/hr (06-17-22 @ 07:52)    Procalcitonin, Serum: 41.77 ng/mL (06-28-22 @ 23:53)     COVID-19 PCR: NotDetec (06-28-22 @ 14:00)  COVID-19 PCR: NotDetec (06-23-22 @ 11:42)  COVID-19 PCR: NotDetec (06-20-22 @ 08:14)  SARS-CoV-2: NotDetec (06-15-22 @ 22:44)    All imaging and other data have been reviewed.  < from: CT Chest No Cont (06.16.22 @ 08:16) >  IMPRESSION:  Small layering bilateral pleural effusions decreased from 4/6/2022.  7 mm groundglass nodule within left lower lobe (series 2 image 44).   Recommend follow-up chest CT in 12 months to determine stability.    Assessment and Plan:   70 yo woman with PMH of HTN, DM2, CAD, CHF, A.fib, multiple falls and ESRD on HD was admitted on 6/16, came to ED with generalized weakness.   Her hypoglycemia and hypothermia on admission could be related to sepsis/SIRS source unclear at this time, could be foot infection? Left hallux looks like  a dry gangrene with superimposed infection.   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 She had Tissue cultures from 3/30/22 grew   klebsiella oxytoca/raoutella oxytoca, E. coli, MSSA so Cefazolin was given after HD to complete the course of treatment.   ESR 13 and CRP 19  MRSA PCR negative   6/21 s/p Femoral popliteal bypass with prosthetic graft  6/23 s/p R 1st toe ray amputation, Wound culture from OR with MSSA and enterococcus fecalis   Pathology showed OM in margines so need 6 weeks treatment from the start   6/27 RRT for symptomatic bradycardia transferred to MICU, had high lactate and leukocytosis.   6/28 stable transferred to medical floor again.   6/28 low grade fever, blood cultures negative     Recommendations:  - Monitor leukocytosis could be reactive stable 13k today  - Vascular surgery and podiatry follow up  - Continue Unasyn 3gm daily on 6/27  - Last day would be 7/26  - Weekly CBC, BMP and CRP  - If fever more than 100.8 send repeat cultures     Will follow PRN.    Geovany Long MD  Division of Infectious Diseases   Please call ID service at [phone] with any question.      35 minutes spent on total encounter assessing patient, examination, chart reivew, counseling and coordinating care by the attending physician/nurse/care manager.

## 2022-06-30 NOTE — BH CONSULTATION LIAISON ASSESSMENT NOTE - HPI (INCLUDE ILLNESS QUALITY, SEVERITY, DURATION, TIMING, CONTEXT, MODIFYING FACTORS, ASSOCIATED SIGNS AND SYMPTOMS)
Patient seen, evaluated and chart reviewed. Patient is a 72 y/o MWF, with history of dementia, no prior psychiatric hospitalizations, PMH of A.fib rate controlled not on AC for hx of multiple falls, T2DM, HTN, HLD, ESRD on HD, CHF, s/p CABG brought in by EMS for generalized weakness at home. Patient states that she was doing well when in the afternoon she tried to get up from her couch and felt weak in the LE and fell back in her couch. Denies any hx of head trauma or loss of consciousness. Denies any weakness or numbness. Denies any chest pain, SOB or palpitations. No fever, cough or chills. No hx of recent travel or sick contacts. At the time of EMS arrival patient was found to have POCBS of 50. EMS gave dextrose and on arrival to the ED patient blood sugar was found to be in 30's with hypothermia of 94.9. Patient presented with confusion and agitation.

## 2022-06-30 NOTE — PROGRESS NOTE ADULT - SUBJECTIVE AND OBJECTIVE BOX
Patient is a 71y Female whom presented to the hospital with esrd on hd     PAST MEDICAL & SURGICAL HISTORY:  Diabetes mellitus II      HTN (hypertension)      h/o Anxiety attack      Depression      h/o Myocardial infarct 2007      CAD (coronary artery disease)      h/o Hepatitis A 1969  currently resolved, no symptoms      PAD (peripheral artery disease)      Murmur, cardiac      h/o Smoking  quitted 3/2012      CRF (chronic renal failure), unspecified stage      Dialysis patient      Anemia secondary to renal failure      HTN (hypertension)      coronary stent 2007      s/p Ovarian cyst removal      s/p surgical removal of benign Skin lesion epigastric area          MEDICATIONS  (STANDING):  amLODIPine   Tablet 5 milliGRAM(s) Oral daily  aspirin enteric coated 81 milliGRAM(s) Oral daily  atorvastatin 40 milliGRAM(s) Oral at bedtime  calcium acetate 667 milliGRAM(s) Oral three times a day with meals  cefTRIAXone   IVPB 1000 milliGRAM(s) IV Intermittent every 24 hours  cloNIDine 0.1 milliGRAM(s) Oral two times a day  clopidogrel Tablet 75 milliGRAM(s) Oral daily  dextrose 5%. 1000 milliLiter(s) (100 mL/Hr) IV Continuous <Continuous>  dextrose 5%. 1000 milliLiter(s) (50 mL/Hr) IV Continuous <Continuous>  dextrose 50% Injectable 25 Gram(s) IV Push once  dextrose 50% Injectable 12.5 Gram(s) IV Push once  dextrose 50% Injectable 25 Gram(s) IV Push once  diltiazem    Tablet 60 milliGRAM(s) Oral every 8 hours  glucagon  Injectable 1 milliGRAM(s) IntraMuscular once  heparin   Injectable 5000 Unit(s) SubCutaneous every 12 hours  imipramine 50 milliGRAM(s) Oral daily  insulin glargine Injectable (LANTUS) 12 Unit(s) SubCutaneous at bedtime  insulin lispro (ADMELOG) corrective regimen sliding scale   SubCutaneous three times a day before meals  insulin lispro (ADMELOG) corrective regimen sliding scale   SubCutaneous at bedtime  metoprolol tartrate 100 milliGRAM(s) Oral every 12 hours  pantoprazole    Tablet 40 milliGRAM(s) Oral before breakfast  povidone iodine 10% Solution 1 Application(s) Topical daily      Allergies    latex (Unknown)  No Known Drug Allergies    Intolerances        SOCIAL HISTORY:  Denies ETOh,Smoking,     FAMILY HISTORY:  No pertinent family history in first degree relatives        REVIEW OF SYSTEMS:    CONSTITUTIONAL: No weakness, fevers or chills  RESPIRATORY: No cough, wheezing, hemoptysis; No shortness of breath  CARDIOVASCULAR: No chest pain or palpitations  GASTROINTESTINAL: No abdominal or epigastric pain. No nausea, vomiting,     No diarrhea or constipation. No melena   SKIN: dry                                                                                                                                              8.8    13.73 )-----------( 323      ( 30 Jun 2022 08:45 )             27.6       CBC Full  -  ( 30 Jun 2022 08:45 )  WBC Count : 13.73 K/uL  RBC Count : 3.03 M/uL  Hemoglobin : 8.8 g/dL  Hematocrit : 27.6 %  Platelet Count - Automated : 323 K/uL  Mean Cell Volume : 91.1 fl  Mean Cell Hemoglobin : 29.0 pg  Mean Cell Hemoglobin Concentration : 31.9 gm/dL  Auto Neutrophil # : 11.63 K/uL  Auto Lymphocyte # : 0.82 K/uL  Auto Monocyte # : 0.95 K/uL  Auto Eosinophil # : 0.09 K/uL  Auto Basophil # : 0.03 K/uL  Auto Neutrophil % : 84.7 %  Auto Lymphocyte % : 6.0 %  Auto Monocyte % : 6.9 %  Auto Eosinophil % : 0.7 %  Auto Basophil % : 0.2 %      06-30    134<L>  |  96  |  29<H>  ----------------------------<  222<H>  4.1   |  28  |  3.30<H>    Ca    9.4      30 Jun 2022 08:45  Phos  2.3     06-29  Mg     2.6     06-29    TPro  6.6  /  Alb  2.4<L>  /  TBili  0.5  /  DBili  x   /  AST  19  /  ALT  13  /  AlkPhos  112  06-30      CAPILLARY BLOOD GLUCOSE  249 (30 Jun 2022 08:15)      POCT Blood Glucose.: 168 mg/dL (30 Jun 2022 17:18)  POCT Blood Glucose.: 163 mg/dL (30 Jun 2022 12:05)  POCT Blood Glucose.: 249 mg/dL (30 Jun 2022 08:01)  POCT Blood Glucose.: 174 mg/dL (29 Jun 2022 21:28)      Vital Signs Last 24 Hrs  T(C): 36.9 (30 Jun 2022 12:09), Max: 36.9 (29 Jun 2022 23:55)  T(F): 98.4 (30 Jun 2022 12:09), Max: 98.4 (29 Jun 2022 23:55)  HR: 86 (30 Jun 2022 14:51) (18 - 130)  BP: 150/65 (30 Jun 2022 14:51) (119/60 - 152/66)  BP(mean): --  RR: 18 (30 Jun 2022 12:09) (18 - 18)  SpO2: 92% (30 Jun 2022 12:09) (90% - 96%)        PT/INR - ( 30 Jun 2022 08:45 )   PT: 13.5 sec;   INR: 1.15 ratio         PTT - ( 30 Jun 2022 08:45 )  PTT:29.7 sec        PHYSICAL EXAM:    Constitutional: NAD  HEENT: conjunctive   clear   Neck:  No JVD  Respiratory: CTAB  Cardiovascular: S1 and S2  Gastrointestinal: BS+, soft, NT/ND  Skin: dry

## 2022-06-30 NOTE — PROGRESS NOTE ADULT - PROBLEM SELECTOR PLAN 1
S/p Right hallux amputation, (DOS: 6/23/22)  - s/p RLE bypass graft  - Pt seen and evaluated.  - No dressing change today. Previously dressing: xeroform and DSD  - Continue IV abx per ID  - Continue vascular recs  - Continue med management   - OR culture Staph Aureus & E. faecalis  - OR pathology: right 1st met clean margin: Minimal chronic osteomyelitis  - Pt to partial weight bear to the right heel as tolerated with assisted device (pt has a history of falls)  - No further podiatric intervention at this time. Pt is stable from out standpoint    WOUND CARE INSTRUCTIONS  1. Please leave dressing clean, dry and intact until follow-up. Monitor for any signs of infection and present to the ED if they arise.  2. If the dressing gets wet, please change with dry, sterile dressing.  3. Patient to be partial weight bear to the right heel as tolerated with assisted device (pt has a history of falls)  4. Patient is to follow up [facility] with Dr. Lau or Dr. Pastor within 3-5 days upon discharge. Please call [phone] as soon as discharged and state that it is for a "post-op appointment".

## 2022-07-01 LAB
ALBUMIN SERPL ELPH-MCNC: 2.1 G/DL — LOW (ref 3.3–5)
ALP SERPL-CCNC: 108 U/L — SIGNIFICANT CHANGE UP (ref 40–120)
ALT FLD-CCNC: 9 U/L — LOW (ref 12–78)
AMMONIA BLD-MCNC: 26 UMOL/L — SIGNIFICANT CHANGE UP (ref 11–32)
ANION GAP SERPL CALC-SCNC: 12 MMOL/L — SIGNIFICANT CHANGE UP (ref 5–17)
AST SERPL-CCNC: 20 U/L — SIGNIFICANT CHANGE UP (ref 15–37)
BASOPHILS # BLD AUTO: 0.04 K/UL — SIGNIFICANT CHANGE UP (ref 0–0.2)
BASOPHILS NFR BLD AUTO: 0.3 % — SIGNIFICANT CHANGE UP (ref 0–2)
BILIRUB SERPL-MCNC: 0.5 MG/DL — SIGNIFICANT CHANGE UP (ref 0.2–1.2)
BUN SERPL-MCNC: 36 MG/DL — HIGH (ref 7–23)
CALCIUM SERPL-MCNC: 9.4 MG/DL — SIGNIFICANT CHANGE UP (ref 8.5–10.1)
CHLORIDE SERPL-SCNC: 100 MMOL/L — SIGNIFICANT CHANGE UP (ref 96–108)
CO2 SERPL-SCNC: 26 MMOL/L — SIGNIFICANT CHANGE UP (ref 22–31)
CREAT SERPL-MCNC: 4.7 MG/DL — HIGH (ref 0.5–1.3)
EGFR: 9 ML/MIN/1.73M2 — LOW
EOSINOPHIL # BLD AUTO: 0.33 K/UL — SIGNIFICANT CHANGE UP (ref 0–0.5)
EOSINOPHIL NFR BLD AUTO: 2.8 % — SIGNIFICANT CHANGE UP (ref 0–6)
GLUCOSE SERPL-MCNC: 192 MG/DL — HIGH (ref 70–99)
HCT VFR BLD CALC: 27.5 % — LOW (ref 34.5–45)
HGB BLD-MCNC: 8.7 G/DL — LOW (ref 11.5–15.5)
IMM GRANULOCYTES NFR BLD AUTO: 1.7 % — HIGH (ref 0–1.5)
LYMPHOCYTES # BLD AUTO: 1.37 K/UL — SIGNIFICANT CHANGE UP (ref 1–3.3)
LYMPHOCYTES # BLD AUTO: 11.5 % — LOW (ref 13–44)
MAGNESIUM SERPL-MCNC: 2.6 MG/DL — SIGNIFICANT CHANGE UP (ref 1.6–2.6)
MCHC RBC-ENTMCNC: 29.2 PG — SIGNIFICANT CHANGE UP (ref 27–34)
MCHC RBC-ENTMCNC: 31.6 GM/DL — LOW (ref 32–36)
MCV RBC AUTO: 92.3 FL — SIGNIFICANT CHANGE UP (ref 80–100)
MONOCYTES # BLD AUTO: 1.02 K/UL — HIGH (ref 0–0.9)
MONOCYTES NFR BLD AUTO: 8.6 % — SIGNIFICANT CHANGE UP (ref 2–14)
NEUTROPHILS # BLD AUTO: 8.93 K/UL — HIGH (ref 1.8–7.4)
NEUTROPHILS NFR BLD AUTO: 75.1 % — SIGNIFICANT CHANGE UP (ref 43–77)
NRBC # BLD: 0 /100 WBCS — SIGNIFICANT CHANGE UP (ref 0–0)
PHOSPHATE SERPL-MCNC: 2.5 MG/DL — SIGNIFICANT CHANGE UP (ref 2.5–4.5)
PLATELET # BLD AUTO: 373 K/UL — SIGNIFICANT CHANGE UP (ref 150–400)
POTASSIUM SERPL-MCNC: 3.9 MMOL/L — SIGNIFICANT CHANGE UP (ref 3.5–5.3)
POTASSIUM SERPL-SCNC: 3.9 MMOL/L — SIGNIFICANT CHANGE UP (ref 3.5–5.3)
PROT SERPL-MCNC: 6.2 G/DL — SIGNIFICANT CHANGE UP (ref 6–8.3)
RBC # BLD: 2.98 M/UL — LOW (ref 3.8–5.2)
RBC # FLD: 18 % — HIGH (ref 10.3–14.5)
SODIUM SERPL-SCNC: 138 MMOL/L — SIGNIFICANT CHANGE UP (ref 135–145)
TSH SERPL-MCNC: 1 UIU/ML — SIGNIFICANT CHANGE UP (ref 0.36–3.74)
VIT B12 SERPL-MCNC: 768 PG/ML — SIGNIFICANT CHANGE UP (ref 232–1245)
WBC # BLD: 11.89 K/UL — HIGH (ref 3.8–10.5)
WBC # FLD AUTO: 11.89 K/UL — HIGH (ref 3.8–10.5)

## 2022-07-01 PROCEDURE — 99233 SBSQ HOSP IP/OBS HIGH 50: CPT | Mod: GC

## 2022-07-01 PROCEDURE — 99232 SBSQ HOSP IP/OBS MODERATE 35: CPT

## 2022-07-01 RX ORDER — METOPROLOL TARTRATE 50 MG
100 TABLET ORAL EVERY 12 HOURS
Refills: 0 | Status: DISCONTINUED | OUTPATIENT
Start: 2022-07-01 | End: 2022-07-07

## 2022-07-01 RX ADMIN — Medication 81 MILLIGRAM(S): at 14:25

## 2022-07-01 RX ADMIN — RISPERIDONE 0.5 MILLIGRAM(S): 4 TABLET ORAL at 17:56

## 2022-07-01 RX ADMIN — HEPARIN SODIUM 5000 UNIT(S): 5000 INJECTION INTRAVENOUS; SUBCUTANEOUS at 17:55

## 2022-07-01 RX ADMIN — ERYTHROPOIETIN 10000 UNIT(S): 10000 INJECTION, SOLUTION INTRAVENOUS; SUBCUTANEOUS at 10:12

## 2022-07-01 RX ADMIN — Medication 0.1 MILLIGRAM(S): at 17:56

## 2022-07-01 RX ADMIN — AMPICILLIN SODIUM AND SULBACTAM SODIUM 200 GRAM(S): 250; 125 INJECTION, POWDER, FOR SUSPENSION INTRAMUSCULAR; INTRAVENOUS at 21:25

## 2022-07-01 RX ADMIN — Medication 2: at 08:41

## 2022-07-01 RX ADMIN — HEPARIN SODIUM 5000 UNIT(S): 5000 INJECTION INTRAVENOUS; SUBCUTANEOUS at 05:42

## 2022-07-01 RX ADMIN — ATORVASTATIN CALCIUM 40 MILLIGRAM(S): 80 TABLET, FILM COATED ORAL at 21:26

## 2022-07-01 RX ADMIN — Medication 50 MILLIGRAM(S): at 14:26

## 2022-07-01 RX ADMIN — Medication 100 MILLIGRAM(S): at 17:54

## 2022-07-01 RX ADMIN — Medication 400 MILLIGRAM(S): at 00:08

## 2022-07-01 RX ADMIN — Medication 5 MILLIGRAM(S): at 00:07

## 2022-07-01 RX ADMIN — CHLORHEXIDINE GLUCONATE 1 APPLICATION(S): 213 SOLUTION TOPICAL at 14:24

## 2022-07-01 RX ADMIN — Medication 1000 MILLIGRAM(S): at 00:59

## 2022-07-01 RX ADMIN — SODIUM CHLORIDE 75 MILLILITER(S): 9 INJECTION, SOLUTION INTRAVENOUS at 05:38

## 2022-07-01 RX ADMIN — INSULIN GLARGINE 8 UNIT(S): 100 INJECTION, SOLUTION SUBCUTANEOUS at 21:26

## 2022-07-01 RX ADMIN — Medication 2: at 17:54

## 2022-07-01 RX ADMIN — CLOPIDOGREL BISULFATE 75 MILLIGRAM(S): 75 TABLET, FILM COATED ORAL at 14:25

## 2022-07-01 NOTE — PROGRESS NOTE ADULT - PROBLEM SELECTOR PLAN 1
S/p Right hallux amputation, (DOS: 6/23/22)  - s/p RLE bypass graft  - Pt seen and evaluated.  - No dressing change today. Previously dressing: xeroform and DSD. Next dressing change tomorrow.  - Continue IV abx per ID  - Continue vascular recs  - Continue med management   - OR culture Staph Aureus & E. faecalis  - OR pathology: right 1st met clean margin: Minimal chronic osteomyelitis  - Pt to partial weight bear to the right heel as tolerated with assisted device (pt has a history of falls)  - No further podiatric intervention at this time. Pt is stable from out standpoint    WOUND CARE INSTRUCTIONS  1. Please leave dressing clean, dry and intact until follow-up. Monitor for any signs of infection and present to the ED if they arise.  2. If the dressing gets wet, please change with dry, sterile dressing.  3. Patient to be partial weight bear to the right heel as tolerated with assisted device (pt has a history of falls)  4. Patient is to follow up [facility] with Dr. Lau or Dr. Pastor within 3-5 days upon discharge. Please call [phone] as soon as discharged and state that it is for a "post-op appointment".

## 2022-07-01 NOTE — PROGRESS NOTE ADULT - ASSESSMENT
pt with hypoglycemia  elevated troponin due to renal failure  ashd , s/p mi  s/p coronary stent  s/p cabg  chf-hfref  esrd - on hd  dm2  hypertension  paf -not on oac - fall risk  pvd - s/p stent  dyslipidemia   chf - compensated - recent coronary angiogram - patent graft -pt's cradiac status stable low  to intermediate risk for  pvd surgery and amputation of great  toe if needed  6/21  pt tolerated rt femoral -poplitael by-pass 6/21  cardiac status stable low risk for rt big toe amputation 6/23  pt tolerated amputation of left great toe 6/23  pt had bradycardia and hypotension- transferred to icu- had atropine and dopamine-metoprolol and cardizem dc 6/27  pt is in rsr rate 80/min - start po metoprolol when possible

## 2022-07-01 NOTE — PROGRESS NOTE ADULT - ASSESSMENT
Patient is a 71 year old female with PMH of A.fib rate controlled not on AC for hx of multiple falls, T2DM, HTN, HLD, ESRD on HD, CHF, s/p CABG brought in by EMS for generalized weakness at home. Patient states that she was doing well when in the afternoon she tried to get up from her couch and felt weak in the LE and fell back in her couch. Denies any hx of head trauma or loss of consciousness. Denies any weakness or numbness. Denies any chest pain, SOB or palpitations. No fever, cough or chills. No hx of recent travel or sick contacts. At the time of EMS arrival patient was found to have POCBS of 50. EMS gave dextrose and on arrival to the ED patient blood sugar was found to be in 30's with hypothermia of 94.9.    ED course:   Vitals:   BP: 176/85  HR:76  Temp:94.2  RR:18, O2:96% on RA   Significant Labs:   CBC: WBC:16.82 Hb:13.2 , Platlets: 260, Neutro:89   CMP: Lytes: WNL, BUN/Creatinine:48/4.8, Glucose:134 , Alkaline Phos:128, AST, 41, eGFR: 9, HsTrop: 275.9, ProBNP: 974195, Lactate: 2  UA; negative  CXR: Heart and lung appear normal (self read)  CT BRAIN: No acute intracranial bleeding. Central volume loss, chronic microvascular ischemic changes, and chronic bilateral lacunar infarctions.  CT CERVICAL SPINE: No fracture. Grade 1 C4-C5 anterolisthesis. C6-C7 disc degeneration. Bilateral pleural effusions and interlobular septal thickening due to   interstitial edema.  EKG: NSR, left axis deviation, HR 71,   QT/QTc: 426/462  Patient received: Ceftriaxone 1 g x1, Dextrose 5% + NS 1L x1, Dextrose 50% IV X1, heating blanket, 2L NC   (16 Jun 2022 01:19)      rrt called transfer to micu     esrd on hd plan for hd as order        ANEMIA PLAN:  Anemia of chronic disease:  We will continue epo  aiming for a HCT of 32-36 %.   We will monitor Iron stores, B12 and RBC folate .    BP monitoring,continue current antihypertensive meds, low salt diet  metoprolol tartrate 100 milliGRAM(s) Oral every 12 hours  cloNIDine 0.1 milliGRAM(s) Oral two times a day    f/u  blood and urine cx,serial lactate levels,monitor vitals closley,ivfs hydration,monitor urine output and renal profile,iv abx  cefTRIAXone   IVPB 2000 milliGRAM(s) IV Intermittent every 24 hours      dvt heparin   Injectable 5000 Unit(s) SubCutaneous every 12 hours

## 2022-07-01 NOTE — PROGRESS NOTE ADULT - SUBJECTIVE AND OBJECTIVE BOX
Patient is a 71y old  Female who presents with a chief complaint of SIRS (01 Jul 2022 09:00)       INTERVAL HPI/OVERNIGHT EVENTS: Patient seen and examined at bedside. Lethargic but awake.     MEDICATIONS  (STANDING):  ampicillin/sulbactam  IVPB 3 Gram(s) IV Intermittent every 24 hours  ampicillin/sulbactam  IVPB      aspirin enteric coated 81 milliGRAM(s) Oral daily  atorvastatin 40 milliGRAM(s) Oral at bedtime  chlorhexidine 4% Liquid 1 Application(s) Topical <User Schedule>  cloNIDine 0.1 milliGRAM(s) Oral two times a day  clopidogrel Tablet 75 milliGRAM(s) Oral daily  dextrose 5% + sodium chloride 0.9%. 1000 milliLiter(s) (75 mL/Hr) IV Continuous <Continuous>  dextrose 5%. 1000 milliLiter(s) (50 mL/Hr) IV Continuous <Continuous>  dextrose 50% Injectable 25 Gram(s) IV Push once  epoetin fawad-epbx (RETACRIT) Injectable 13609 Unit(s) IV Push <User Schedule>  glucagon  Injectable 1 milliGRAM(s) IntraMuscular once  heparin   Injectable 5000 Unit(s) SubCutaneous every 12 hours  imipramine 50 milliGRAM(s) Oral daily  insulin glargine Injectable (LANTUS) 8 Unit(s) SubCutaneous at bedtime  insulin lispro (ADMELOG) corrective regimen sliding scale   SubCutaneous three times a day before meals  insulin lispro (ADMELOG) corrective regimen sliding scale   SubCutaneous at bedtime  metoprolol tartrate Injectable 5 milliGRAM(s) IV Push every 6 hours  pantoprazole    Tablet 40 milliGRAM(s) Oral before breakfast  risperiDONE   Tablet 0.5 milliGRAM(s) Oral two times a day    MEDICATIONS  (PRN):  acetaminophen     Tablet .. 650 milliGRAM(s) Oral every 6 hours PRN Temp greater or equal to 38C (100.4F), Mild Pain (1 - 3)  aluminum hydroxide/magnesium hydroxide/simethicone Suspension 30 milliLiter(s) Oral every 4 hours PRN Dyspepsia  dextrose Oral Gel 15 Gram(s) Oral once PRN Blood Glucose LESS THAN 70 milliGRAM(s)/deciliter  melatonin 3 milliGRAM(s) Oral at bedtime PRN Insomnia  OLANZapine Injectable 2.5 milliGRAM(s) IntraMuscular every 6 hours PRN Acute agitation      Allergies    latex (Unknown)  No Known Drug Allergies    Intolerances        REVIEW OF SYSTEMS:  Unable to Obtain, confused   Vital Signs Last 24 Hrs  T(C): 36.3 (01 Jul 2022 04:31), Max: 37.8 (30 Jun 2022 20:26)  T(F): 97.4 (01 Jul 2022 04:31), Max: 100.1 (01 Jul 2022 00:00)  HR: 84 (01 Jul 2022 04:31) (82 - 98)  BP: 158/57 (01 Jul 2022 04:31) (150/65 - 171/68)  BP(mean): --  RR: 18 (01 Jul 2022 09:22) (17 - 18)  SpO2: 97% (01 Jul 2022 09:22) (85% - 98%)    PHYSICAL EXAM:  GENERAL: Lethargic, but awake, confused   HEAD:  Atraumatic, Normocephalic  EYES: EOMI, PERRLA, conjunctiva and sclera clear  ENMT: No tonsillar erythema, exudates, or enlargement; Moist mucous membranes  NECK: Supple, No JVD, Normal thyroid  CHEST/LUNG: Clear to auscultation bilaterally, No wheezing   HEART: S1S2+,  Regular rate and rhythm  ABDOMEN: Soft, Nontender, Nondistended; Bowel sounds present  EXTREMITIES:  2+ Peripheral Pulses, No clubbing, cyanosis  LYMPH: No lymphadenopathy noted  SKIN: No rashes, warm, dry     LABS:                        8.7    11.89 )-----------( 373      ( 01 Jul 2022 06:40 )             27.5     01 Jul 2022 06:40    138    |  100    |  36     ----------------------------<  192    3.9     |  26     |  4.70     Ca    9.4        01 Jul 2022 06:40  Phos  2.5       01 Jul 2022 06:40  Mg     2.6       01 Jul 2022 06:40    TPro  6.2    /  Alb  2.1    /  TBili  0.5    /  DBili  x      /  AST  20     /  ALT  9      /  AlkPhos  108    01 Jul 2022 06:40    PT/INR - ( 30 Jun 2022 08:45 )   PT: 13.5 sec;   INR: 1.15 ratio         PTT - ( 30 Jun 2022 08:45 )  PTT:29.7 sec  CAPILLARY BLOOD GLUCOSE      POCT Blood Glucose.: 221 mg/dL (01 Jul 2022 08:11)  POCT Blood Glucose.: 204 mg/dL (30 Jun 2022 21:36)  POCT Blood Glucose.: 168 mg/dL (30 Jun 2022 17:18)  POCT Blood Glucose.: 163 mg/dL (30 Jun 2022 12:05)    BLOOD CULTURE  06-28 @ 23:58   No growth to date.  --  --  06-28 @ 23:53   No growth to date.  --  --  06-27 @ 11:45   No growth to date.  --  --  06-27 @ 11:40   No growth to date.  --  --    RADIOLOGY & ADDITIONAL TESTS:    Imaging Personally Reviewed:  [ ] YES     Consultant(s) Notes Reviewed:      Care Discussed with Consultants/Other Providers:

## 2022-07-01 NOTE — PROGRESS NOTE ADULT - SUBJECTIVE AND OBJECTIVE BOX
Patient is a 71y Female whom presented to the hospital with esrd on hd     PAST MEDICAL & SURGICAL HISTORY:  Diabetes mellitus II      HTN (hypertension)      h/o Anxiety attack      Depression      h/o Myocardial infarct 2007      CAD (coronary artery disease)      h/o Hepatitis A 1969  currently resolved, no symptoms      PAD (peripheral artery disease)      Murmur, cardiac      h/o Smoking  quitted 3/2012      CRF (chronic renal failure), unspecified stage      Dialysis patient      Anemia secondary to renal failure      HTN (hypertension)      coronary stent 2007      s/p Ovarian cyst removal      s/p surgical removal of benign Skin lesion epigastric area          MEDICATIONS  (STANDING):  amLODIPine   Tablet 5 milliGRAM(s) Oral daily  aspirin enteric coated 81 milliGRAM(s) Oral daily  atorvastatin 40 milliGRAM(s) Oral at bedtime  calcium acetate 667 milliGRAM(s) Oral three times a day with meals  cefTRIAXone   IVPB 1000 milliGRAM(s) IV Intermittent every 24 hours  cloNIDine 0.1 milliGRAM(s) Oral two times a day  clopidogrel Tablet 75 milliGRAM(s) Oral daily  dextrose 5%. 1000 milliLiter(s) (100 mL/Hr) IV Continuous <Continuous>  dextrose 5%. 1000 milliLiter(s) (50 mL/Hr) IV Continuous <Continuous>  dextrose 50% Injectable 25 Gram(s) IV Push once  dextrose 50% Injectable 12.5 Gram(s) IV Push once  dextrose 50% Injectable 25 Gram(s) IV Push once  diltiazem    Tablet 60 milliGRAM(s) Oral every 8 hours  glucagon  Injectable 1 milliGRAM(s) IntraMuscular once  heparin   Injectable 5000 Unit(s) SubCutaneous every 12 hours  imipramine 50 milliGRAM(s) Oral daily  insulin glargine Injectable (LANTUS) 12 Unit(s) SubCutaneous at bedtime  insulin lispro (ADMELOG) corrective regimen sliding scale   SubCutaneous three times a day before meals  insulin lispro (ADMELOG) corrective regimen sliding scale   SubCutaneous at bedtime  metoprolol tartrate 100 milliGRAM(s) Oral every 12 hours  pantoprazole    Tablet 40 milliGRAM(s) Oral before breakfast  povidone iodine 10% Solution 1 Application(s) Topical daily      Allergies    latex (Unknown)  No Known Drug Allergies    Intolerances        SOCIAL HISTORY:  Denies ETOh,Smoking,     FAMILY HISTORY:  No pertinent family history in first degree relatives        REVIEW OF SYSTEMS:    CONSTITUTIONAL: No weakness, fevers or chills  RESPIRATORY: No cough, wheezing, hemoptysis; No shortness of breath  CARDIOVASCULAR: No chest pain or palpitations  GASTROINTESTINAL: No abdominal or epigastric pain. No nausea, vomiting,     No diarrhea or constipation. No melena   SKIN: dry                                                                                                                                                                    8.7    11.89 )-----------( 373      ( 01 Jul 2022 06:40 )             27.5       CBC Full  -  ( 01 Jul 2022 06:40 )  WBC Count : 11.89 K/uL  RBC Count : 2.98 M/uL  Hemoglobin : 8.7 g/dL  Hematocrit : 27.5 %  Platelet Count - Automated : 373 K/uL  Mean Cell Volume : 92.3 fl  Mean Cell Hemoglobin : 29.2 pg  Mean Cell Hemoglobin Concentration : 31.6 gm/dL  Auto Neutrophil # : 8.93 K/uL  Auto Lymphocyte # : 1.37 K/uL  Auto Monocyte # : 1.02 K/uL  Auto Eosinophil # : 0.33 K/uL  Auto Basophil # : 0.04 K/uL  Auto Neutrophil % : 75.1 %  Auto Lymphocyte % : 11.5 %  Auto Monocyte % : 8.6 %  Auto Eosinophil % : 2.8 %  Auto Basophil % : 0.3 %      07-01    138  |  100  |  36<H>  ----------------------------<  192<H>  3.9   |  26  |  4.70<H>    Ca    9.4      01 Jul 2022 06:40  Phos  2.5     07-01  Mg     2.6     07-01    TPro  6.2  /  Alb  2.1<L>  /  TBili  0.5  /  DBili  x   /  AST  20  /  ALT  9<L>  /  AlkPhos  108  07-01      CAPILLARY BLOOD GLUCOSE      POCT Blood Glucose.: 218 mg/dL (01 Jul 2022 17:26)  POCT Blood Glucose.: 109 mg/dL (01 Jul 2022 12:40)  POCT Blood Glucose.: 221 mg/dL (01 Jul 2022 08:11)  POCT Blood Glucose.: 204 mg/dL (30 Jun 2022 21:36)      Vital Signs Last 24 Hrs  T(C): 36.5 (01 Jul 2022 13:10), Max: 37.8 (30 Jun 2022 20:26)  T(F): 97.7 (01 Jul 2022 13:10), Max: 100.1 (01 Jul 2022 00:00)  HR: 106 (01 Jul 2022 17:57) (75 - 106)  BP: 167/61 (01 Jul 2022 17:57) (114/73 - 171/68)  BP(mean): --  RR: 19 (01 Jul 2022 17:57) (17 - 20)  SpO2: 94% (01 Jul 2022 17:57) (93% - 100%)        PT/INR - ( 30 Jun 2022 08:45 )   PT: 13.5 sec;   INR: 1.15 ratio         PTT - ( 30 Jun 2022 08:45 )  PTT:29.7 sec      PHYSICAL EXAM:    Constitutional: NAD  HEENT: conjunctive   clear   Neck:  No JVD  Respiratory: CTAB  Cardiovascular: S1 and S2  Gastrointestinal: BS+, soft, NT/ND  Skin: dry

## 2022-07-01 NOTE — SWALLOW BEDSIDE ASSESSMENT ADULT - SLP GENERAL OBSERVATIONS
Pt received upright in bed A&A Ox1, reduced cognition, follows simple commands, +O2NC, pain scale 0/10 pre & post eval

## 2022-07-01 NOTE — SWALLOW BEDSIDE ASSESSMENT ADULT - ASR SWALLOW RECOMMEND DIAG
Not warranted at this time due to +overt s/s aspiration with thin liquids, will follow to reassess at bedside and further determine MBS candidacy.

## 2022-07-01 NOTE — SWALLOW BEDSIDE ASSESSMENT ADULT - SWALLOW EVAL: DIAGNOSIS
Oral stage WFL for puree, soft & bite sized, mildly thick and thin liquids.  Mild oral dysphagia with regular solids marked by reduced mastication further impacted by edentulous lower dentition.  Suspect pharyngeal dysphagia with thin liquids due to +cough immediately post swallow.  No overt s/s penetration/aspiration with puree, soft & bite sized, regular solids or mildly thick liquids.

## 2022-07-01 NOTE — PROGRESS NOTE ADULT - SUBJECTIVE AND OBJECTIVE BOX
70 y/o female seen at dialysis. Pt doing well, responding to verbal cues. Pt resting in bed comfortably receiving dialysis. Dressing to the right foot clean, dry, and intact.     MEDICATIONS  (STANDING):  ampicillin/sulbactam  IVPB 3 Gram(s) IV Intermittent every 24 hours  ampicillin/sulbactam  IVPB      aspirin enteric coated 81 milliGRAM(s) Oral daily  atorvastatin 40 milliGRAM(s) Oral at bedtime  chlorhexidine 4% Liquid 1 Application(s) Topical <User Schedule>  cloNIDine 0.1 milliGRAM(s) Oral two times a day  clopidogrel Tablet 75 milliGRAM(s) Oral daily  dextrose 5%. 1000 milliLiter(s) (50 mL/Hr) IV Continuous <Continuous>  dextrose 50% Injectable 25 Gram(s) IV Push once  epoetin fawad-epbx (RETACRIT) Injectable 08035 Unit(s) IV Push <User Schedule>  glucagon  Injectable 1 milliGRAM(s) IntraMuscular once  heparin   Injectable 5000 Unit(s) SubCutaneous every 12 hours  imipramine 50 milliGRAM(s) Oral daily  insulin glargine Injectable (LANTUS) 15 Unit(s) SubCutaneous at bedtime  insulin lispro (ADMELOG) corrective regimen sliding scale   SubCutaneous three times a day before meals  insulin lispro (ADMELOG) corrective regimen sliding scale   SubCutaneous at bedtime  metoprolol tartrate 100 milliGRAM(s) Oral two times a day  pantoprazole    Tablet 40 milliGRAM(s) Oral before breakfast    MEDICATIONS  (PRN):  acetaminophen     Tablet .. 650 milliGRAM(s) Oral every 6 hours PRN Temp greater or equal to 38C (100.4F), Mild Pain (1 - 3)  aluminum hydroxide/magnesium hydroxide/simethicone Suspension 30 milliLiter(s) Oral every 4 hours PRN Dyspepsia  dextrose Oral Gel 15 Gram(s) Oral once PRN Blood Glucose LESS THAN 70 milliGRAM(s)/deciliter  melatonin 3 milliGRAM(s) Oral at bedtime PRN Insomnia  71 year old Female seen at bedside. Pt downgraded from MICU. Pt  S/p Right hallux amputation (DOS: 6/23/22). Pt doing well, feeling much better today.   Allergies    ICU Vital Signs Last 24 Hrs  T(C): 36.9 (30 Jun 2022 12:09), Max: 36.9 (29 Jun 2022 23:55)  T(F): 98.4 (30 Jun 2022 12:09), Max: 98.4 (29 Jun 2022 23:55)  HR: 82 (30 Jun 2022 12:09) (18 - 132)  BP: 152/66 (30 Jun 2022 12:09) (119/60 - 152/66)  BP(mean): --  ABP: --  ABP(mean): --  RR: 18 (30 Jun 2022 12:09) (18 - 18)  SpO2: 92% (30 Jun 2022 12:09) (90% - 96%)    CBC Full  -  ( 30 Jun 2022 08:45 )  WBC Count : 13.73 K/uL  RBC Count : 3.03 M/uL  Hemoglobin : 8.8 g/dL  Hematocrit : 27.6 %  Platelet Count - Automated : 323 K/uL  Mean Cell Volume : 91.1 fl  Mean Cell Hemoglobin : 29.0 pg  Mean Cell Hemoglobin Concentration : 31.9 gm/dL  Auto Neutrophil # : 11.63 K/uL  Auto Lymphocyte # : 0.82 K/uL  Auto Monocyte # : 0.95 K/uL  Auto Eosinophil # : 0.09 K/uL  Auto Basophil # : 0.03 K/uL  Auto Neutrophil % : 84.7 %  Auto Lymphocyte % : 6.0 %  Auto Monocyte % : 6.9 %  Auto Eosinophil % : 0.7 %  Auto Basophil % : 0.2 %      Allergies    latex (Unknown)  No Known Drug Allergies    Intolerances      PHYSICAL EXAM:  Vascular: Right DP & PT dopplerable.  Capillary refill (CFT) 3 seconds. Digital hair absent bilaterally.   Neurological: Light touch sensation diminished bilaterally.  Musculoskeletal: s/p right hallux amputation. strength in all quadrants bilaterally, AJ & STJ ROM absent b/l.   Dermatological: sutures to right hallux intact. No signs of wound dehiscence. No signs of infection.       Culture - Tissue with Gram Stain (collected 23 Jun 2022 15:35)  Source: .Tissue Other, right hallux bone culture  Gram Stain (24 Jun 2022 04:37):    Rare polymorphonuclear leukocytes seen per low power field    Few Gram positive cocci in pairs seen per oil power field        ACCESSION No:  [id number]  Patient:   SHILO KOHLER      Accession:                             [id number]    Collected Date/Time:                   6/23/2022 15:35 EDT  Received Date/Time:                    6/24/2022 07:44 EDT    Surgical Pathology Report - Auth (Verified)    Specimen(s) Submitted  1  Right hallux pathology  2  Right first metatarsal/clean margin pathology    Final Diagnosis  1. Right hallux  -Acute and chronic osteomyelitis.  -Gangrenous skin and soft tissue.    2. Right first metatarsal/clean margin:  -Minimal chronic osteomyelitis.    Verified by: Randall Rodriguez MD  (Electronic Signature)  Reported on: 06/27/22 12:18 EDT, [facility], [address] [place] [address]

## 2022-07-01 NOTE — PROGRESS NOTE ADULT - ASSESSMENT
72 yo woman with Hx ERSD, PVD, CAD, CABG admitted 6/16 with R hallux gangrene requiring R fem-pop bypass and partial ray amputation.  Course complicated by   episode of junctional bradycardia (likely med related), resulting in cardiogenic shock and encephalopathy with transfer to ICU, later resolved and transferred to  telemetry, now Afib with RVR. \    Problem/Plan :  ·  Problem: Altered Mental Status  ·  Plan: - A&Ox1 this am, stroke code activated. CT head showed no acute infarct. Pt required Zyprexa overnight, altered mental status likely related to sedative effect of medication.  - F/u neuro and psych consults  -Speech and swallow evaluation       Problem/Plan :  ·  Problem: Type2 Diabetes, s/p  Hypoglycemia.  ·  Plan: - Patient presented with c/o generalized weakness and fall and was found to have blood sugar in 30's  - Patient with hx of T2DM, on Lantus 40 units qhs not on any oral hypoglycemics per outpatient pharmacy   - Accu checks  - Adjust Insulin dose based upon blood sugar. Endo Dr. Perlman   - continue ISS        Problem/Plan :  ·  Problem: SIRS (systemic inflammatory response syndrome). probably 2/2 to Rt toe infection  ·  Plan: -- CXR: no clear evidence of PNA   - Hx of L medial malleolus growing klebsiella oxytoca/raoutella oxytoca, E. coli, MSSA. Recent blood cultures negative.  - S/P R fem-BK pop w/PTFE POD# 5, S/P Partial ray amputation of R foot  POD #3  - reactive leucocytosis.  - Bone culture growing MSSA  - Changed to Unasyn per ID recs, to continue until July 26  - Requested IR PICC line  - ID recs appreciated    Problem/Plan :  ·  Problem: Pleural effusion.   ·  Plan: - No Hx of pulmonary disease  - Patient does not c/o cough, fever or chills  - CT Cervical shows Bilateral pleural effusions and interlobular septal thickening due to interstitial edema.  - ID Dr. Tsai, consult appreciated      Problem/Plan :  ·  Problem: HFrEF (heart failure with reduced ejection fraction).   ·  Plan: - patient with hx of HFrEF  - last ECHO in 04/22 shows EF of 45%, moderate MR  - Admission labs show ProBNP of 027585  - Likely elevated in the setting of ESRD  - Consult Dr. James, appreciate recs   - Avoid excessive fluids.     Problem/Plan :  ·  Problem: Elevated troponin.   ·  Plan: - Admission labs show elevated Troponin of 275.9  - Patient denies any chest pain, sob or palpitations  - EKG shows NSR with left axis deviation with non specific ST and T waves changes  - elevated troponin likely due to ESRD     Problem/Plan :  ·  Problem: ESRD on hemodialysis.   ·  Plan: - Patient with hx of ESRD  - On HD TTS schedule  - admission eGFR 9  - Follow Dr. Edwards      Problem/Plan :  ·  Problem: T2DM (type 2 diabetes mellitus).   ·  Plan: - Chronic stable  - home dose Lantus 40 qhs  - plan as above     Problem/Plan :  ·  Problem: Atrial fibrillation.   ·  Plan: - Patient with hx of P A.Fib  - Metoprolol 100 q12h  - Not on any AC because of hx of multiple falls   - EKG shows NSR with left axis deviation with non specific ST and T waves changes.     Problem/Plan :  ·  Problem: Benign essential HTN.   ·  Plan: - Chronic stable  - Continue clonidine 0.1mg BID  - Dash/Renal Diet  - continue to monitor routine hemodynamics.     Problem/Plan :  ·  Problem: HLD (hyperlipidemia).   ·  Plan; - Chronic stable  - On atorvastatin 40mg at home - will continue  - Dash/Renal diet.     Problem/Plan :  ·  Problem: Depression, major.   ·  Plan: - Chronic stable  - Continue imipramine 50qhs.     Problem/Plan :  Junctional bradycardia resulting in cardiogenic shock  - Transferred to ICU, now resolved  - Clonidine for htn  - Lopressor 100mg q12h per cardio recs        Problem/Plan :  ·  Problem: Need for prophylactic measure.   ·  Plan: - DVT Prophylaxis: Heparin 5000 units q12.

## 2022-07-01 NOTE — PROGRESS NOTE ADULT - SUBJECTIVE AND OBJECTIVE BOX
[facility]  INFECTIOUS DISEASES   [address] [place] [address]  Tel: [phone]     Fax: [phone]  ======================================================  MD Zita Alejandra Kaushal, MD Cho, Michelle, MD   ======================================================    [id number]  SHILO KOHLER     Follow up: R foot wound/gangrene     Awake and alert, doing well, no complaint. No pain in leg.   Wound looks clean, healing no sign of ischemia.   Seen during HD this morning.   Tmax 100.1.     PAST MEDICAL & SURGICAL HISTORY:  Diabetes mellitus II  HTN (hypertension)  h/o Anxiety attack  Depression  h/o Myocardial infarct 2007  CAD (coronary artery disease)  h/o Hepatitis A 1969  currently resolved, no symptoms  PAD (peripheral artery disease)  Murmur, cardiac  h/o Smoking  quitted 3/2012  CRF (chronic renal failure), unspecified stage  Dialysis patient  Anemia secondary to renal failure  HTN (hypertension)  coronary stent 2007  s/p Ovarian cyst removal  s/p surgical removal of benign Skin lesion epigastric area    Social Hx: no current smoking, ETOH or drugs     FAMILY HISTORY:  No pertinent family history in first degree relatives    Allergies  latex (Unknown)  No Known Drug Allergies    Antibiotics:  zosyn     REVIEW OF SYSTEMS:  CONSTITUTIONAL:  No Fever or chills  HEENT:  No diplopia or blurred vision.  No sore throat or runny nose.  CARDIOVASCULAR:  No chest pain or SOB.  RESPIRATORY:  No cough, shortness of breath, PND or orthopnea.  GASTROINTESTINAL:  No nausea, vomiting or diarrhea.  GENITOURINARY:  No dysuria, frequency or urgency. No Blood in urine  MUSCULOSKELETAL:  No more pain in leg   SKIN:  R foot wound better  NEUROLOGIC:  No paresthesias, fasciculations, seizures or weakness.  PSYCHIATRIC:  No disorder of thought or mood.  ENDOCRINE:  No heat or cold intolerance, polyuria or polydipsia.  HEMATOLOGICAL:  No easy bruising or bleeding.     Physical Exam:  Vital Signs Last 24 Hrs  T(C): 36.5 (01 Jul 2022 13:10), Max: 37.8 (30 Jun 2022 20:26)  T(F): 97.7 (01 Jul 2022 13:10), Max: 100.1 (01 Jul 2022 00:00)  HR: 75 (01 Jul 2022 13:10) (75 - 98)  BP: 124/77 (01 Jul 2022 13:10) (114/73 - 171/68)  BP(mean): --  RR: 20 (01 Jul 2022 13:10) (17 - 20)  SpO2: 94% (01 Jul 2022 14:11) (85% - 100%)  GEN: NAD  HEENT: normocephalic and atraumatic. EOMI. PERRL.    NECK: Supple.  No lymphadenopathy   LUNGS: Clear to auscultation.  HEART: Irregular rate and rhythm   ABDOMEN: Soft, nontender, and nondistended.  Positive bowel sounds.    : No CVA tenderness  EXTREMITIES: Now with 2 long wounds in medial side of leg after vascular surgery look clean  R foot dressed after amputation wound clean   NEUROLOGIC: grossly intact.  PSYCHIATRIC: Appropriate affect .  SKIN: No rash     Labs:                        8.7    11.89 )-----------( 373      ( 01 Jul 2022 06:40 )             27.5     07-01    138  |  100  |  36<H>  ----------------------------<  192<H>  3.9   |  26  |  4.70<H>    Ca    9.4      01 Jul 2022 06:40  Phos  2.5     07-01  Mg     2.6     07-01    TPro  6.2  /  Alb  2.1<L>  /  TBili  0.5  /  DBili  x   /  AST  20  /  ALT  9<L>  /  AlkPhos  108  07-01      Culture - Blood (collected 06-28-22 @ 23:58)  Source: .Blood Blood-Peripheral    Culture - Blood (collected 06-28-22 @ 23:53)  Source: .Blood Blood-Peripheral    Culture - Blood (collected 06-27-22 @ 11:45)  Source: .Blood Blood-Peripheral    Culture - Blood (collected 06-27-22 @ 11:40)  Source: .Blood Blood-Peripheral    Culture - Tissue with Gram Stain (collected 06-23-22 @ 15:35)  Source: .Tissue Other, right hallux bone culture  Gram Stain (06-24-22 @ 04:37):    Rare polymorphonuclear leukocytes seen per low power field    Few Gram positive cocci in pairs seen per oil power field  Final Report (06-28-22 @ 13:56):    Few Staphylococcus aureus    Rare Enterococcus faecalis  Organism: Staphylococcus aureus  Enterococcus faecalis (06-28-22 @ 13:56)  Organism: Enterococcus faecalis (06-28-22 @ 13:56)    Sensitivities:      -  Ampicillin: S <=2 Predicts results to ampicillin/sulbactam, amoxacillin-clavulanate and  piperacillin-tazobactam.      -  Tetra/Doxy: R >8      -  Vancomycin: S 2      Method Type: SONIA  Organism: Staphylococcus aureus (06-28-22 @ 13:56)    Sensitivities:      -  Ampicillin/Sulbactam: S <=8/4      -  Cefazolin: S <=4      -  Clindamycin: R <=0.25 This isolate is presumed to be clindamycin resistant based on detection of inducible resistance. Clindamycin may still be effective in some patients.      -  Erythromycin: R >4      -  Gentamicin: S <=1 Should not be used as monotherapy      -  Oxacillin: S <=0.25 Oxacillin predicts susceptibility for dicloxacillin, methicillin, and nafcillin      -  Penicillin: R >8      -  Rifampin: S <=1 Should not be used as monotherapy      -  Tetra/Doxy: S <=1      -  Trimethoprim/Sulfamethoxazole: S <=0.5/9.5      -  Vancomycin: S 1      Method Type: SONIA    WBC Count: 11.89 K/uL (07-01-22 @ 06:40)  WBC Count: 13.73 K/uL (06-30-22 @ 08:45)  WBC Count: 13.58 K/uL (06-29-22 @ 07:15)  WBC Count: 16.04 K/uL (06-28-22 @ 08:20)  WBC Count: 17.10 K/uL (06-27-22 @ 11:40)  WBC Count: 19.17 K/uL (06-27-22 @ 06:58)    Creatinine, Serum: 4.70 mg/dL (07-01-22 @ 06:40)  Creatinine, Serum: 3.30 mg/dL (06-30-22 @ 08:45)  Creatinine, Serum: 4.80 mg/dL (06-29-22 @ 07:15)  Creatinine, Serum: 3.40 mg/dL (06-28-22 @ 08:20)  Creatinine, Serum: 6.30 mg/dL (06-27-22 @ 11:40)  Creatinine, Serum: 6.20 mg/dL (06-27-22 @ 06:58)    Procalcitonin, Serum: 41.77 ng/mL (06-28-22 @ 23:53)     COVID-19 PCR: NotDetec (06-28-22 @ 14:00)  COVID-19 PCR: NotDetec (06-23-22 @ 11:42)  COVID-19 PCR: NotDetec (06-20-22 @ 08:14)  SARS-CoV-2: NotDetec (06-15-22 @ 22:44)    All imaging and other data have been reviewed.  < from: CT Chest No Cont (06.16.22 @ 08:16) >  IMPRESSION:  Small layering bilateral pleural effusions decreased from 4/6/2022.  7 mm groundglass nodule within left lower lobe (series 2 image 44).   Recommend follow-up chest CT in 12 months to determine stability.    Assessment and Plan:   70 yo woman with PMH of HTN, DM2, CAD, CHF, A.fib, multiple falls and ESRD on HD was admitted on 6/16, came to ED with generalized weakness.   Her hypoglycemia and hypothermia on admission could be related to sepsis/SIRS source unclear at this time, could be foot infection? Left hallux looks like  a dry gangrene with superimposed infection.   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 She had Tissue cultures from 3/30/22 grew   klebsiella oxytoca/raoutella oxytoca, E. coli, MSSA so Cefazolin was given after HD to complete the course of treatment.   ESR 13 and CRP 19  MRSA PCR negative   6/21 s/p Femoral popliteal bypass with prosthetic graft  6/23 s/p R 1st toe ray amputation, Wound culture from OR with MSSA and enterococcus fecalis   Pathology showed OM in margines so need 6 weeks treatment from the start   6/27 RRT for symptomatic bradycardia transferred to MICU, had high lactate and leukocytosis.   6/28 stable transferred to medical floor again.   6/28 low grade fever, blood cultures negative     Recommendations:  - Leukocytosis improved to 11k  - Vascular surgery and podiatry follow up  - Continue Unasyn 3gm daily   - Last day would be 7/26  - Weekly CBC, BMP and CRP  - If fever more than 100.8 send repeat cultures   - When ready for discharge can switch to vancomycin 1gm post HD and check trough and keep around 10-15.   - Follow in wound center one after discharge     Will sign off please call with any question.   Discussed with Dr. Armenta and Dr. Perez.     Geovany Long MD  Division of Infectious Diseases   Please call ID service at [phone] with any question.      35 minutes spent on total encounter assessing patient, examination, chart reivew, counseling and coordinating care by the attending physician/nurse/care manager.

## 2022-07-01 NOTE — SWALLOW BEDSIDE ASSESSMENT ADULT - COMMENTS
Chart reviewed order received for swallow eval.  Pt received upright in bed A&A Ox1, reduced cognition, follows simple commands, +O2NC, pain scale 0/10 pre & post eval.  Swallow eval completed see below for details.  Pt educated on rx's, ongoing education due to reduced cognition.  Pt left as received NAD LEV Callejas & Dr. Armenta's resident [phone] notified.  Will follow to check PO tolerance and reassess at bedside, as schedule permits.    Per charting, pt is a "70 yo woman with Hx ERSD, PVD, CAD, CABG admitted 6/16 with R hallux gangrene requiring R fem-pop bypass and partial ray amputation.  Course complicated by   episode of junctional bradycardia (likely med related), resulting in cardiogenic shock and encephalopathy with transfer to ICU, later resolved and transferred to  telemetry, now Afib with RVR."    Chest xray 6/27/22: " Cardiomegaly with interstitial pulmonary edema as a change compared to the previous study. Small bibasilar pleural effusions are suspected as well."    CT Brain Stroke Protocol 6/20/22: "No evidence of an acute intracranial hemorrhage, midline shift, or   hydrocephalus. Chronic small right cerebellar infarct. Atrophy with   chronic white matter ischemic changes. No significant interval change   from prior."    WBC 7/1/22 "11.89"

## 2022-07-01 NOTE — PROGRESS NOTE ADULT - SUBJECTIVE AND OBJECTIVE BOX
Patient is a 71y Female with a known history of :  SIRS (systemic inflammatory response syndrome) [R65.10]    Hypoglycemia [E16.2]    Pleural effusion [J90]    CHF, acute [I50.9]    Chronic CHF [I50.9]    Elevated troponin [R77.8]    Atrial fibrillation [I48.91]    Benign essential HTN [I10]    HLD (hyperlipidemia) [E78.5]    Depression, major [F32.9]    Need for prophylactic measure [Z29.9]    ESRD on hemodialysis [N18.6]    T2DM (type 2 diabetes mellitus) [E11.9]    HFrEF (heart failure with reduced ejection fraction) [I50.20]    Gangrene of toe of right foot [I96]    Atherosclerotic PVD with ulceration [I70.209]    PVD (peripheral vascular disease) [I73.9]      HPI:  Patient is a 71 year old female with PMH of A.fib rate controlled not on AC for hx of multiple falls, T2DM, HTN, HLD, ESRD on HD, CHF, s/p CABG brought in by EMS for generalized weakness at home. Patient states that she was doing well when in the afternoon she tried to get up from her couch and felt weak in the LE and fell back in her couch. Denies any hx of head trauma or loss of consciousness. Denies any weakness or numbness. Denies any chest pain, SOB or palpitations. No fever, cough or chills. No hx of recent travel or sick contacts. At the time of EMS arrival patient was found to have POCBS of 50. EMS gave dextrose and on arrival to the ED patient blood sugar was found to be in 30's with hypothermia of 94.9.    ED course:   Vitals:   BP: 176/85  HR:76  Temp:94.2  RR:18, O2:96% on RA   Significant Labs:   CBC: WBC:16.82 Hb:13.2 , Platlets: 260, Neutro:89   CMP: Lytes: WNL, BUN/Creatinine:48/4.8, Glucose:134 , Alkaline Phos:128, AST, 41, eGFR: 9, HsTrop: 275.9, ProBNP: 845855, Lactate: 2  UA; negative  CXR: Heart and lung appear normal (self read)  CT BRAIN: No acute intracranial bleeding. Central volume loss, chronic microvascular ischemic changes, and chronic bilateral lacunar infarctions.  CT CERVICAL SPINE: No fracture. Grade 1 C4-C5 anterolisthesis. C6-C7 disc degeneration. Bilateral pleural effusions and interlobular septal thickening due to   interstitial edema.  EKG: NSR, left axis deviation, HR 71,   QT/QTc: 426/462  Patient received: Ceftriaxone 1 g x1, Dextrose 5% + NS 1L x1, Dextrose 50% IV X1, heating blanket, 2L NC   (16 Jun 2022 01:19)      REVIEW OF SYSTEMS:    CONSTITUTIONAL: No fever, weight loss, or fatigue  EYES: No eye pain, visual disturbances, or discharge  ENMT:  No difficulty hearing, tinnitus, vertigo; No sinus or throat pain  NECK: No pain or stiffness  BREASTS: No pain, masses, or nipple discharge  RESPIRATORY: No cough, wheezing, chills or hemoptysis; No shortness of breath  CARDIOVASCULAR: No chest pain, palpitations, dizziness, or leg swelling  GASTROINTESTINAL: No abdominal or epigastric pain. No nausea, vomiting, or hematemesis; No diarrhea or constipation. No melena or hematochezia.  GENITOURINARY: No dysuria, frequency, hematuria, or incontinence  NEUROLOGICAL: No headaches, memory loss, loss of strength, numbness, or tremors  SKIN: No itching, burning, rashes, or lesions   LYMPH NODES: No enlarged glands  ENDOCRINE: No heat or cold intolerance; No hair loss  MUSCULOSKELETAL: No joint pain or swelling; No muscle, back, or extremity pain  PSYCHIATRIC: No depression, anxiety, mood swings, or difficulty sleeping  HEME/LYMPH: No easy bruising, or bleeding gums  ALLERGY AND IMMUNOLOGIC: No hives or eczema    MEDICATIONS  (STANDING):  ampicillin/sulbactam  IVPB 3 Gram(s) IV Intermittent every 24 hours  ampicillin/sulbactam  IVPB      aspirin enteric coated 81 milliGRAM(s) Oral daily  atorvastatin 40 milliGRAM(s) Oral at bedtime  chlorhexidine 4% Liquid 1 Application(s) Topical <User Schedule>  cloNIDine 0.1 milliGRAM(s) Oral two times a day  clopidogrel Tablet 75 milliGRAM(s) Oral daily  dextrose 5% + sodium chloride 0.9%. 1000 milliLiter(s) (75 mL/Hr) IV Continuous <Continuous>  dextrose 5%. 1000 milliLiter(s) (50 mL/Hr) IV Continuous <Continuous>  dextrose 50% Injectable 25 Gram(s) IV Push once  epoetin fawad-epbx (RETACRIT) Injectable 47238 Unit(s) IV Push <User Schedule>  glucagon  Injectable 1 milliGRAM(s) IntraMuscular once  heparin   Injectable 5000 Unit(s) SubCutaneous every 12 hours  imipramine 50 milliGRAM(s) Oral daily  insulin glargine Injectable (LANTUS) 8 Unit(s) SubCutaneous at bedtime  insulin lispro (ADMELOG) corrective regimen sliding scale   SubCutaneous at bedtime  insulin lispro (ADMELOG) corrective regimen sliding scale   SubCutaneous three times a day before meals  metoprolol tartrate Injectable 5 milliGRAM(s) IV Push every 6 hours  pantoprazole    Tablet 40 milliGRAM(s) Oral before breakfast  risperiDONE   Tablet 0.5 milliGRAM(s) Oral two times a day    MEDICATIONS  (PRN):  acetaminophen     Tablet .. 650 milliGRAM(s) Oral every 6 hours PRN Temp greater or equal to 38C (100.4F), Mild Pain (1 - 3)  aluminum hydroxide/magnesium hydroxide/simethicone Suspension 30 milliLiter(s) Oral every 4 hours PRN Dyspepsia  dextrose Oral Gel 15 Gram(s) Oral once PRN Blood Glucose LESS THAN 70 milliGRAM(s)/deciliter  melatonin 3 milliGRAM(s) Oral at bedtime PRN Insomnia  OLANZapine Injectable 2.5 milliGRAM(s) IntraMuscular every 6 hours PRN Acute agitation      ALLERGIES: latex (Unknown)  No Known Drug Allergies      FAMILY HISTORY:  No pertinent family history in first degree relatives        PHYSICAL EXAMINATION:  -----------------------------  T(C): 36.3 (07-01-22 @ 04:31), Max: 37.8 (06-30-22 @ 20:26)  HR: 84 (07-01-22 @ 04:31) (82 - 98)  BP: 158/57 (07-01-22 @ 04:31) (150/65 - 171/68)  RR: 18 (07-01-22 @ 04:31) (17 - 18)  SpO2: 93% (07-01-22 @ 04:31) (85% - 98%)  Wt(kg): --    06-30 @ 07:01  -  07-01 @ 07:00  --------------------------------------------------------  IN:    dextrose 5% + sodium chloride 0.9%: 150 mL  Total IN: 150 mL    OUT:  Total OUT: 0 mL    Total NET: 150 mL            VITALS  T(C): 36.3 (07-01-22 @ 04:31), Max: 37.8 (06-30-22 @ 20:26)  HR: 84 (07-01-22 @ 04:31) (82 - 98)  BP: 158/57 (07-01-22 @ 04:31) (150/65 - 171/68)  RR: 18 (07-01-22 @ 04:31) (17 - 18)  SpO2: 93% (07-01-22 @ 04:31) (85% - 98%)    Constitutional: well developed, normal appearance, well groomed, well nourished, no deformities and no acute distress.   Eyes: the conjunctiva exhibited no abnormalities and the eyelids demonstrated no xanthelasmas.   HEENT: normal oral mucosa, no oral pallor and no oral cyanosis.   Neck: normal jugular venous A waves present, normal jugular venous V waves present and no jugular venous wilson A waves.   Pulmonary: no respiratory distress, normal respiratory rhythm and effort, no accessory muscle use and lungs were clear to auscultation bilaterally.   Cardiovascular: heart rate and rhythm were normal, normal S1 and S2 and no murmur, gallop, rub, heave or thrill are present.   Abdomen: soft, non-tender, no hepato-splenomegaly and no abdominal mass palpated.   Musculoskeletal: the gait could not be assessed..   Extremities: no clubbing of the fingernails, no localized cyanosis, no petechial hemorrhages and no ischemic changes.   Skin: normal skin color and pigmentation, no rash, no venous stasis, no skin lesions, no skin ulcer and no xanthoma was observed.   Psychiatric: oriented to person, place, and time, the affect was normal, the mood was normal and not feeling anxious.     LABS:   --------  07-01    138  |  100  |  36<H>  ----------------------------<  192<H>  3.9   |  26  |  4.70<H>    Ca    9.4      01 Jul 2022 06:40  Mg     2.6     07-01    TPro  x   /  Alb  2.1<L>  /  TBili  x   /  DBili  x   /  AST  20  /  ALT  9<L>  /  AlkPhos  x   07-01                         8.7    11.89 )-----------( 373      ( 01 Jul 2022 06:40 )             27.5     PT/INR - ( 30 Jun 2022 08:45 )   PT: 13.5 sec;   INR: 1.15 ratio         PTT - ( 30 Jun 2022 08:45 )  PTT:29.7 sec            RADIOLOGY:  -----------------    ECG:     ECHO:

## 2022-07-01 NOTE — PROGRESS NOTE ADULT - SUBJECTIVE AND OBJECTIVE BOX
Neurology follow up note    SHILO [id number]      Interval History:    Patient feels ok     Allergies    latex (Unknown)  No Known Drug Allergies    Intolerances        MEDICATIONS    acetaminophen     Tablet .. 650 milliGRAM(s) Oral every 6 hours PRN  aluminum hydroxide/magnesium hydroxide/simethicone Suspension 30 milliLiter(s) Oral every 4 hours PRN  ampicillin/sulbactam  IVPB 3 Gram(s) IV Intermittent every 24 hours  ampicillin/sulbactam  IVPB      aspirin enteric coated 81 milliGRAM(s) Oral daily  atorvastatin 40 milliGRAM(s) Oral at bedtime  chlorhexidine 4% Liquid 1 Application(s) Topical <User Schedule>  cloNIDine 0.1 milliGRAM(s) Oral two times a day  clopidogrel Tablet 75 milliGRAM(s) Oral daily  dextrose 5% + sodium chloride 0.9%. 1000 milliLiter(s) IV Continuous <Continuous>  dextrose 5%. 1000 milliLiter(s) IV Continuous <Continuous>  dextrose 50% Injectable 25 Gram(s) IV Push once  dextrose Oral Gel 15 Gram(s) Oral once PRN  epoetin fawad-epbx (RETACRIT) Injectable 17372 Unit(s) IV Push <User Schedule>  glucagon  Injectable 1 milliGRAM(s) IntraMuscular once  heparin   Injectable 5000 Unit(s) SubCutaneous every 12 hours  imipramine 50 milliGRAM(s) Oral daily  insulin glargine Injectable (LANTUS) 8 Unit(s) SubCutaneous at bedtime  insulin lispro (ADMELOG) corrective regimen sliding scale   SubCutaneous at bedtime  insulin lispro (ADMELOG) corrective regimen sliding scale   SubCutaneous three times a day before meals  melatonin 3 milliGRAM(s) Oral at bedtime PRN  metoprolol tartrate Injectable 5 milliGRAM(s) IV Push every 6 hours  OLANZapine Injectable 2.5 milliGRAM(s) IntraMuscular every 6 hours PRN  pantoprazole    Tablet 40 milliGRAM(s) Oral before breakfast  risperiDONE   Tablet 0.5 milliGRAM(s) Oral two times a day              Vital Signs Last 24 Hrs  T(C): 36.4 (01 Jul 2022 09:50), Max: 37.8 (30 Jun 2022 20:26)  T(F): 97.5 (01 Jul 2022 09:50), Max: 100.1 (01 Jul 2022 00:00)  HR: 91 (01 Jul 2022 09:55) (82 - 98)  BP: 160/55 (01 Jul 2022 09:55) (146/57 - 171/68)  BP(mean): --  RR: 18 (01 Jul 2022 09:50) (17 - 18)  SpO2: 100% (01 Jul 2022 09:50) (85% - 100%)      REVIEW OF SYSTEMS:  Slightly limited secondary to the patient being altered, but constitutionally, she denies fever, chills, or night sweats.  Head:  No headaches.  Eyes:  No double vision or blurry vision.  Ears:  No ringing in the ears.  Neck:  No neck pain.  Cardiovascular:  No chest pain.  Respiratory:  No shortness of breath.  Abdomen:  No nausea, vomiting, or abdominal pain.  Extremities/Neurological:  No numbness or tingling.  Musculoskeletal:  No joint pain.      PHYSICAL EXAMINATION:   HEENT:  Head:  Normocephalic, atraumatic.  Eyes:  No scleral icterus.  Ears:  Hearing appeared to be intact.  NECK:  Supple.  CARDIOVASCULAR:  S1 and S2 are heard.  RESPIRATORY:  Decreased breath sounds bilaterally, gives poor effort.  ABDOMEN:  Soft, nontender.  EXTREMITIES:  No clubbing or cyanosis was noted.      NEUROLOGIC:  The patient was arousable to verbal stimuli.  Location unknown, year was 2004, was able to name the number of fingers in each eye.  Pupils bilaterally were 2 mm, slightly reactive.  Speech was fluent.  Smile symmetric.  Motor, bilateral upper 4/5, bilateral lower 4-/5.  The patient, per previous note, does have significantly impaired proprioception, bilateral extremities, and knee jerks were trace, suspect this is from underlying diabetes.            LABS:  CBC Full  -  ( 01 Jul 2022 06:40 )  WBC Count : 11.89 K/uL  RBC Count : 2.98 M/uL  Hemoglobin : 8.7 g/dL  Hematocrit : 27.5 %  Platelet Count - Automated : 373 K/uL  Mean Cell Volume : 92.3 fl  Mean Cell Hemoglobin : 29.2 pg  Mean Cell Hemoglobin Concentration : 31.6 gm/dL  Auto Neutrophil # : 8.93 K/uL  Auto Lymphocyte # : 1.37 K/uL  Auto Monocyte # : 1.02 K/uL  Auto Eosinophil # : 0.33 K/uL  Auto Basophil # : 0.04 K/uL  Auto Neutrophil % : 75.1 %  Auto Lymphocyte % : 11.5 %  Auto Monocyte % : 8.6 %  Auto Eosinophil % : 2.8 %  Auto Basophil % : 0.3 %      07-01    138  |  100  |  36<H>  ----------------------------<  192<H>  3.9   |  26  |  4.70<H>    Ca    9.4      01 Jul 2022 06:40  Phos  2.5     07-01  Mg     2.6     07-01    TPro  6.2  /  Alb  2.1<L>  /  TBili  0.5  /  DBili  x   /  AST  20  /  ALT  9<L>  /  AlkPhos  108  07-01    Hemoglobin A1C:     LIVER FUNCTIONS - ( 01 Jul 2022 06:40 )  Alb: 2.1 g/dL / Pro: 6.2 g/dL / ALK PHOS: 108 U/L / ALT: 9 U/L / AST: 20 U/L / GGT: x           Vitamin B12   PT/INR - ( 30 Jun 2022 08:45 )   PT: 13.5 sec;   INR: 1.15 ratio         PTT - ( 30 Jun 2022 08:45 )  PTT:29.7 sec      RADIOLOGY    ANALYSIS AND PLAN:  This is a 71-year-old with episode of altered mental status.    For episode of altered mental status, suspect this is multifactorial secondary to underlying metabolic encephalopathy along with mild cognitive impairment and subtle dementia becoming more prominent in the hospital setting.  Examination was limited, but I see no clear focality to suggest a new cerebrovascular accident has ensued.  For history of ataxia and falls, most likely multifactorial secondary to age-related changes and neuropathy.  For history of diabetes, strict control of blood sugars.  For history of hypertension, monitor systolic blood pressure.  For hyperlipidemia, statin.  For mild cognitive impairment versus subtle dementia, for now supportive therapy.  appears more interactive today    Attempted to contact spouse, Geoffrey, at [phone] 7/1    Greater than 45 minutes of time was spent with the patient, plan of care, reviewing data, with greater than 50% of the visit was spent counseling and/or coordinating care with multidisciplinary healthcare team

## 2022-07-02 LAB
ANION GAP SERPL CALC-SCNC: 7 MMOL/L — SIGNIFICANT CHANGE UP (ref 5–17)
BUN SERPL-MCNC: 21 MG/DL — SIGNIFICANT CHANGE UP (ref 7–23)
CALCIUM SERPL-MCNC: 9 MG/DL — SIGNIFICANT CHANGE UP (ref 8.5–10.1)
CHLORIDE SERPL-SCNC: 100 MMOL/L — SIGNIFICANT CHANGE UP (ref 96–108)
CO2 SERPL-SCNC: 31 MMOL/L — SIGNIFICANT CHANGE UP (ref 22–31)
CREAT SERPL-MCNC: 3.4 MG/DL — HIGH (ref 0.5–1.3)
CULTURE RESULTS: SIGNIFICANT CHANGE UP
CULTURE RESULTS: SIGNIFICANT CHANGE UP
EGFR: 14 ML/MIN/1.73M2 — LOW
GLUCOSE SERPL-MCNC: 153 MG/DL — HIGH (ref 70–99)
HCT VFR BLD CALC: 26.9 % — LOW (ref 34.5–45)
HGB BLD-MCNC: 8.5 G/DL — LOW (ref 11.5–15.5)
MAGNESIUM SERPL-MCNC: 2.1 MG/DL — SIGNIFICANT CHANGE UP (ref 1.6–2.6)
MCHC RBC-ENTMCNC: 29.1 PG — SIGNIFICANT CHANGE UP (ref 27–34)
MCHC RBC-ENTMCNC: 31.6 GM/DL — LOW (ref 32–36)
MCV RBC AUTO: 92.1 FL — SIGNIFICANT CHANGE UP (ref 80–100)
NRBC # BLD: 0 /100 WBCS — SIGNIFICANT CHANGE UP (ref 0–0)
PLATELET # BLD AUTO: 355 K/UL — SIGNIFICANT CHANGE UP (ref 150–400)
POTASSIUM SERPL-MCNC: 3.9 MMOL/L — SIGNIFICANT CHANGE UP (ref 3.5–5.3)
POTASSIUM SERPL-SCNC: 3.9 MMOL/L — SIGNIFICANT CHANGE UP (ref 3.5–5.3)
RBC # BLD: 2.92 M/UL — LOW (ref 3.8–5.2)
RBC # FLD: 17.9 % — HIGH (ref 10.3–14.5)
SARS-COV-2 RNA SPEC QL NAA+PROBE: SIGNIFICANT CHANGE UP
SODIUM SERPL-SCNC: 138 MMOL/L — SIGNIFICANT CHANGE UP (ref 135–145)
SPECIMEN SOURCE: SIGNIFICANT CHANGE UP
SPECIMEN SOURCE: SIGNIFICANT CHANGE UP
WBC # BLD: 10.69 K/UL — HIGH (ref 3.8–10.5)
WBC # FLD AUTO: 10.69 K/UL — HIGH (ref 3.8–10.5)

## 2022-07-02 PROCEDURE — 99233 SBSQ HOSP IP/OBS HIGH 50: CPT | Mod: GC

## 2022-07-02 RX ORDER — INSULIN LISPRO 100/ML
VIAL (ML) SUBCUTANEOUS
Refills: 0 | Status: DISCONTINUED | OUTPATIENT
Start: 2022-07-02 | End: 2022-07-07

## 2022-07-02 RX ADMIN — RISPERIDONE 0.5 MILLIGRAM(S): 4 TABLET ORAL at 17:31

## 2022-07-02 RX ADMIN — Medication 2: at 08:07

## 2022-07-02 RX ADMIN — HEPARIN SODIUM 5000 UNIT(S): 5000 INJECTION INTRAVENOUS; SUBCUTANEOUS at 17:31

## 2022-07-02 RX ADMIN — CHLORHEXIDINE GLUCONATE 1 APPLICATION(S): 213 SOLUTION TOPICAL at 06:03

## 2022-07-02 RX ADMIN — RISPERIDONE 0.5 MILLIGRAM(S): 4 TABLET ORAL at 04:39

## 2022-07-02 RX ADMIN — HEPARIN SODIUM 5000 UNIT(S): 5000 INJECTION INTRAVENOUS; SUBCUTANEOUS at 04:39

## 2022-07-02 RX ADMIN — Medication 50 MILLIGRAM(S): at 11:17

## 2022-07-02 RX ADMIN — Medication 650 MILLIGRAM(S): at 15:30

## 2022-07-02 RX ADMIN — AMPICILLIN SODIUM AND SULBACTAM SODIUM 200 GRAM(S): 250; 125 INJECTION, POWDER, FOR SUSPENSION INTRAMUSCULAR; INTRAVENOUS at 19:59

## 2022-07-02 RX ADMIN — CLOPIDOGREL BISULFATE 75 MILLIGRAM(S): 75 TABLET, FILM COATED ORAL at 11:18

## 2022-07-02 RX ADMIN — Medication 650 MILLIGRAM(S): at 14:23

## 2022-07-02 RX ADMIN — Medication 0.1 MILLIGRAM(S): at 04:39

## 2022-07-02 RX ADMIN — ATORVASTATIN CALCIUM 40 MILLIGRAM(S): 80 TABLET, FILM COATED ORAL at 21:36

## 2022-07-02 RX ADMIN — INSULIN GLARGINE 8 UNIT(S): 100 INJECTION, SOLUTION SUBCUTANEOUS at 21:36

## 2022-07-02 RX ADMIN — PANTOPRAZOLE SODIUM 40 MILLIGRAM(S): 20 TABLET, DELAYED RELEASE ORAL at 04:39

## 2022-07-02 RX ADMIN — Medication 100 MILLIGRAM(S): at 04:39

## 2022-07-02 RX ADMIN — Medication 2: at 17:35

## 2022-07-02 RX ADMIN — Medication 81 MILLIGRAM(S): at 11:18

## 2022-07-02 RX ADMIN — Medication 0.1 MILLIGRAM(S): at 17:31

## 2022-07-02 RX ADMIN — Medication 100 MILLIGRAM(S): at 17:31

## 2022-07-02 NOTE — PROGRESS NOTE ADULT - PROBLEM SELECTOR PLAN 1
cont lantus 8 units qhs  change mod dose admelog corrective scale coverage qac/qhs  cont cons cho diet  goal bg 100-180 in hosp setting

## 2022-07-02 NOTE — PROGRESS NOTE ADULT - ASSESSMENT
Patient is a 71 year old female with PMH of A.fib rate controlled not on AC for hx of multiple falls, T2DM, HTN, HLD, ESRD on HD, CHF, s/p CABG brought in by EMS for generalized weakness at home. Patient states that she was doing well when in the afternoon she tried to get up from her couch and felt weak in the LE and fell back in her couch. Denies any hx of head trauma or loss of consciousness. Denies any weakness or numbness. Denies any chest pain, SOB or palpitations. No fever, cough or chills. No hx of recent travel or sick contacts. At the time of EMS arrival patient was found to have POCBS of 50. EMS gave dextrose and on arrival to the ED patient blood sugar was found to be in 30's with hypothermia of 94.9.    ED course:   Vitals:   BP: 176/85  HR:76  Temp:94.2  RR:18, O2:96% on RA   Significant Labs:   CBC: WBC:16.82 Hb:13.2 , Platlets: 260, Neutro:89   CMP: Lytes: WNL, BUN/Creatinine:48/4.8, Glucose:134 , Alkaline Phos:128, AST, 41, eGFR: 9, HsTrop: 275.9, ProBNP: [id number], Lactate: 2  UA; negative  CXR: Heart and lung appear normal (self read)  CT BRAIN: No acute intracranial bleeding. Central volume loss, chronic microvascular ischemic changes, and chronic bilateral lacunar infarctions.  CT CERVICAL SPINE: No fracture. Grade 1 C4-C5 anterolisthesis. C6-C7 disc degeneration. Bilateral pleural effusions and interlobular septal thickening due to   interstitial edema.  EKG: NSR, left axis deviation, HR 71,   QT/QTc: 426/462  Patient received: Ceftriaxone 1 g x1, Dextrose 5% + NS 1L x1, Dextrose 50% IV X1, heating blanket, 2L NC   (16 Jun 2022 01:19)      rrt called transfer to [facility]     esrd on hd plan for hd as order        ANEMIA PLAN:  Anemia of chronic disease:  We will continue epo  aiming for a HCT of 32-36 %.   We will monitor Iron stores, B12 and RBC folate .    BP monitoring,continue current antihypertensive meds, low salt diet  metoprolol tartrate 100 milliGRAM(s) Oral every 12 hours  cloNIDine 0.1 milliGRAM(s) Oral two times a day    f/u  blood and urine cx,serial lactate levels,monitor vitals closley,ivfs hydration,monitor urine output and renal profile,iv abx  cefTRIAXone   IVPB 2000 milliGRAM(s) IV Intermittent every 24 hours      dvt heparin   Injectable 5000 Unit(s) SubCutaneous every 12 hours

## 2022-07-02 NOTE — PROGRESS NOTE ADULT - SUBJECTIVE AND OBJECTIVE BOX
Patient is a 71y Female with a known history of :  SIRS (systemic inflammatory response syndrome) [R65.10]    Hypoglycemia [E16.2]    Pleural effusion [J90]    CHF, acute [I50.9]    Chronic CHF [I50.9]    Elevated troponin [R77.8]    Atrial fibrillation [I48.91]    Benign essential HTN [I10]    HLD (hyperlipidemia) [E78.5]    Depression, major [F32.9]    Need for prophylactic measure [Z29.9]    ESRD on hemodialysis [N18.6]    T2DM (type 2 diabetes mellitus) [E11.9]    HFrEF (heart failure with reduced ejection fraction) [I50.20]    Gangrene of toe of right foot [I96]    Atherosclerotic PVD with ulceration [I70.209]    PVD (peripheral vascular disease) [I73.9]      HPI:  Patient is a 71 year old female with PMH of A.fib rate controlled not on AC for hx of multiple falls, T2DM, HTN, HLD, ESRD on HD, CHF, s/p CABG brought in by EMS for generalized weakness at home. Patient states that she was doing well when in the afternoon she tried to get up from her couch and felt weak in the LE and fell back in her couch. Denies any hx of head trauma or loss of consciousness. Denies any weakness or numbness. Denies any chest pain, SOB or palpitations. No fever, cough or chills. No hx of recent travel or sick contacts. At the time of EMS arrival patient was found to have POCBS of 50. EMS gave dextrose and on arrival to the ED patient blood sugar was found to be in 30's with hypothermia of 94.9.    ED course:   Vitals:   BP: 176/85  HR:76  Temp:94.2  RR:18, O2:96% on RA   Significant Labs:   CBC: WBC:16.82 Hb:13.2 , Platlets: 260, Neutro:89   CMP: Lytes: WNL, BUN/Creatinine:48/4.8, Glucose:134 , Alkaline Phos:128, AST, 41, eGFR: 9, HsTrop: 275.9, ProBNP: 280510, Lactate: 2  UA; negative  CXR: Heart and lung appear normal (self read)  CT BRAIN: No acute intracranial bleeding. Central volume loss, chronic microvascular ischemic changes, and chronic bilateral lacunar infarctions.  CT CERVICAL SPINE: No fracture. Grade 1 C4-C5 anterolisthesis. C6-C7 disc degeneration. Bilateral pleural effusions and interlobular septal thickening due to   interstitial edema.  EKG: NSR, left axis deviation, HR 71,   QT/QTc: 426/462  Patient received: Ceftriaxone 1 g x1, Dextrose 5% + NS 1L x1, Dextrose 50% IV X1, heating blanket, 2L NC   (16 Jun 2022 01:19)      REVIEW OF SYSTEMS:    CONSTITUTIONAL: No fever, weight loss, or fatigue  EYES: No eye pain, visual disturbances, or discharge  ENMT:  No difficulty hearing, tinnitus, vertigo; No sinus or throat pain  NECK: No pain or stiffness  BREASTS: No pain, masses, or nipple discharge  RESPIRATORY: No cough, wheezing, chills or hemoptysis; No shortness of breath  CARDIOVASCULAR: No chest pain, palpitations, dizziness, or leg swelling  GASTROINTESTINAL: No abdominal or epigastric pain. No nausea, vomiting, or hematemesis; No diarrhea or constipation. No melena or hematochezia.  GENITOURINARY: No dysuria, frequency, hematuria, or incontinence  NEUROLOGICAL: No headaches, memory loss, loss of strength, numbness, or tremors  SKIN: No itching, burning, rashes, or lesions   LYMPH NODES: No enlarged glands  ENDOCRINE: No heat or cold intolerance; No hair loss  MUSCULOSKELETAL: No joint pain or swelling; No muscle, back, or extremity pain  PSYCHIATRIC: No depression, anxiety, mood swings, or difficulty sleeping  HEME/LYMPH: No easy bruising, or bleeding gums  ALLERGY AND IMMUNOLOGIC: No hives or eczema    MEDICATIONS  (STANDING):  ampicillin/sulbactam  IVPB 3 Gram(s) IV Intermittent every 24 hours  ampicillin/sulbactam  IVPB      aspirin enteric coated 81 milliGRAM(s) Oral daily  atorvastatin 40 milliGRAM(s) Oral at bedtime  chlorhexidine 4% Liquid 1 Application(s) Topical <User Schedule>  cloNIDine 0.1 milliGRAM(s) Oral two times a day  clopidogrel Tablet 75 milliGRAM(s) Oral daily  dextrose 5%. 1000 milliLiter(s) (50 mL/Hr) IV Continuous <Continuous>  dextrose 50% Injectable 25 Gram(s) IV Push once  epoetin fawad-epbx (RETACRIT) Injectable 24839 Unit(s) IV Push <User Schedule>  glucagon  Injectable 1 milliGRAM(s) IntraMuscular once  heparin   Injectable 5000 Unit(s) SubCutaneous every 12 hours  imipramine 50 milliGRAM(s) Oral daily  insulin glargine Injectable (LANTUS) 8 Unit(s) SubCutaneous at bedtime  insulin lispro (ADMELOG) corrective regimen sliding scale   SubCutaneous three times a day before meals  insulin lispro (ADMELOG) corrective regimen sliding scale   SubCutaneous at bedtime  metoprolol tartrate 100 milliGRAM(s) Oral every 12 hours  pantoprazole    Tablet 40 milliGRAM(s) Oral before breakfast  risperiDONE   Tablet 0.5 milliGRAM(s) Oral two times a day    MEDICATIONS  (PRN):  acetaminophen     Tablet .. 650 milliGRAM(s) Oral every 6 hours PRN Temp greater or equal to 38C (100.4F), Mild Pain (1 - 3)  aluminum hydroxide/magnesium hydroxide/simethicone Suspension 30 milliLiter(s) Oral every 4 hours PRN Dyspepsia  dextrose Oral Gel 15 Gram(s) Oral once PRN Blood Glucose LESS THAN 70 milliGRAM(s)/deciliter  melatonin 3 milliGRAM(s) Oral at bedtime PRN Insomnia  OLANZapine Injectable 2.5 milliGRAM(s) IntraMuscular every 6 hours PRN Acute agitation      ALLERGIES: latex (Unknown)  No Known Drug Allergies      FAMILY HISTORY:  No pertinent family history in first degree relatives        PHYSICAL EXAMINATION:  -----------------------------  T(C): 36.9 (07-02-22 @ 04:31), Max: 38 (07-01-22 @ 19:42)  HR: 83 (07-02-22 @ 04:31) (75 - 106)  BP: 170/69 (07-02-22 @ 04:31) (114/73 - 170/69)  RR: 17 (07-02-22 @ 04:31) (17 - 20)  SpO2: 94% (07-02-22 @ 04:31) (94% - 100%)  Wt(kg): --    07-01 @ 07:01  -  07-02 @ 07:00  --------------------------------------------------------  IN:    dextrose 5% + sodium chloride 0.9%: 150 mL    Oral Fluid: 120 mL  Total IN: 270 mL    OUT:    Other (mL): 1500 mL  Total OUT: 1500 mL    Total NET: -1230 mL            VITALS  T(C): 36.9 (07-02-22 @ 04:31), Max: 38 (07-01-22 @ 19:42)  HR: 83 (07-02-22 @ 04:31) (75 - 106)  BP: 170/69 (07-02-22 @ 04:31) (114/73 - 170/69)  RR: 17 (07-02-22 @ 04:31) (17 - 20)  SpO2: 94% (07-02-22 @ 04:31) (94% - 100%)    Constitutional: well developed, normal appearance, well groomed, well nourished, no deformities and no acute distress.   Eyes: the conjunctiva exhibited no abnormalities and the eyelids demonstrated no xanthelasmas.   HEENT: normal oral mucosa, no oral pallor and no oral cyanosis.   Neck: normal jugular venous A waves present, normal jugular venous V waves present and no jugular venous wilson A waves.   Pulmonary: no respiratory distress, normal respiratory rhythm and effort, no accessory muscle use and lungs were clear to auscultation bilaterally.   Cardiovascular: heart rate and rhythm were normal, normal S1 and S2 and no murmur, gallop, rub, heave or thrill are present.   Abdomen: soft, non-tender, no hepato-splenomegaly and no abdominal mass palpated.   Musculoskeletal: the gait could not be assessed..   Extremities: no clubbing of the fingernails, no localized cyanosis, no petechial hemorrhages and no ischemic changes.   Skin: normal skin color and pigmentation, no rash, no venous stasis, no skin lesions, no skin ulcer and no xanthoma was observed.   Psychiatric: oriented to person, place, and time, the affect was normal, the mood was normal and not feeling anxious.     LABS:   --------  07-02    138  |  100  |  21  ----------------------------<  153<H>  3.9   |  31  |  3.40<H>    Ca    9.0      02 Jul 2022 06:00  Phos  2.5     07-01  Mg     2.1     07-02    TPro  6.2  /  Alb  2.1<L>  /  TBili  0.5  /  DBili  x   /  AST  20  /  ALT  9<L>  /  AlkPhos  108  07-01                         8.5    10.69 )-----------( 355      ( 02 Jul 2022 06:00 )             26.9                 RADIOLOGY:  -----------------    ECG:     ECHO:

## 2022-07-02 NOTE — PROGRESS NOTE ADULT - SUBJECTIVE AND OBJECTIVE BOX
Neurology follow up note    SHILO [id number]      Interval History:    Patient feels ok no new complaints.    Allergies    latex (Unknown)  No Known Drug Allergies    Intolerances        MEDICATIONS    acetaminophen     Tablet .. 650 milliGRAM(s) Oral every 6 hours PRN  aluminum hydroxide/magnesium hydroxide/simethicone Suspension 30 milliLiter(s) Oral every 4 hours PRN  ampicillin/sulbactam  IVPB 3 Gram(s) IV Intermittent every 24 hours  ampicillin/sulbactam  IVPB      aspirin enteric coated 81 milliGRAM(s) Oral daily  atorvastatin 40 milliGRAM(s) Oral at bedtime  chlorhexidine 4% Liquid 1 Application(s) Topical <User Schedule>  cloNIDine 0.1 milliGRAM(s) Oral two times a day  clopidogrel Tablet 75 milliGRAM(s) Oral daily  dextrose 5%. 1000 milliLiter(s) IV Continuous <Continuous>  dextrose 50% Injectable 25 Gram(s) IV Push once  dextrose Oral Gel 15 Gram(s) Oral once PRN  epoetin fawad-epbx (RETACRIT) Injectable 42598 Unit(s) IV Push <User Schedule>  glucagon  Injectable 1 milliGRAM(s) IntraMuscular once  heparin   Injectable 5000 Unit(s) SubCutaneous every 12 hours  imipramine 50 milliGRAM(s) Oral daily  insulin glargine Injectable (LANTUS) 8 Unit(s) SubCutaneous at bedtime  insulin lispro (ADMELOG) corrective regimen sliding scale   SubCutaneous three times a day before meals  insulin lispro (ADMELOG) corrective regimen sliding scale   SubCutaneous at bedtime  melatonin 3 milliGRAM(s) Oral at bedtime PRN  metoprolol tartrate 100 milliGRAM(s) Oral every 12 hours  OLANZapine Injectable 2.5 milliGRAM(s) IntraMuscular every 6 hours PRN  pantoprazole    Tablet 40 milliGRAM(s) Oral before breakfast  risperiDONE   Tablet 0.5 milliGRAM(s) Oral two times a day              Vital Signs Last 24 Hrs  T(C): 36.8 (02 Jul 2022 11:29), Max: 38 (01 Jul 2022 19:42)  T(F): 98.2 (02 Jul 2022 11:29), Max: 100.4 (01 Jul 2022 19:42)  HR: 76 (02 Jul 2022 11:29) (76 - 106)  BP: 146/71 (02 Jul 2022 11:29) (118/69 - 170/69)  BP(mean): --  RR: 18 (02 Jul 2022 11:29) (17 - 19)  SpO2: 93% (02 Jul 2022 11:29) (93% - 94%)      REVIEW OF SYSTEMS:  Slightly limited secondary to the patient being altered, but constitutionally, she denies fever, chills, or night sweats.  Head:  No headaches.  Eyes:  No double vision or blurry vision.  Ears:  No ringing in the ears.  Neck:  No neck pain.  Cardiovascular:  No chest pain.  Respiratory:  No shortness of breath.  Abdomen:  No nausea, vomiting, or abdominal pain.  Extremities/Neurological:  No numbness or tingling.  Musculoskeletal:  No joint pain.      PHYSICAL EXAMINATION:   HEENT:  Head:  Normocephalic, atraumatic.  Eyes:  No scleral icterus.  Ears:  Hearing appeared to be intact.  NECK:  Supple.  CARDIOVASCULAR:  S1 and S2 are heard.  RESPIRATORY:  Decreased breath sounds bilaterally, gives poor effort.  ABDOMEN:  Soft, nontender.  EXTREMITIES:  No clubbing or cyanosis was noted.      NEUROLOGIC:  The patient was arousable to verbal stimuli.  Location unknown, year was 2004, was able to name the number of fingers in each eye.  Pupils bilaterally were 2 mm, slightly reactive.  Speech was fluent.  Smile symmetric.  Motor, bilateral upper 4/5, bilateral lower 4-/5.  The patient, per previous note, does have significantly impaired proprioception, bilateral extremities, and knee jerks were trace, suspect this is from underlying diabetes.               LABS:  CBC Full  -  ( 02 Jul 2022 06:00 )  WBC Count : 10.69 K/uL  RBC Count : 2.92 M/uL  Hemoglobin : 8.5 g/dL  Hematocrit : 26.9 %  Platelet Count - Automated : 355 K/uL  Mean Cell Volume : 92.1 fl  Mean Cell Hemoglobin : 29.1 pg  Mean Cell Hemoglobin Concentration : 31.6 gm/dL  Auto Neutrophil # : x  Auto Lymphocyte # : x  Auto Monocyte # : x  Auto Eosinophil # : x  Auto Basophil # : x  Auto Neutrophil % : x  Auto Lymphocyte % : x  Auto Monocyte % : x  Auto Eosinophil % : x  Auto Basophil % : x      07-02    138  |  100  |  21  ----------------------------<  153<H>  3.9   |  31  |  3.40<H>    Ca    9.0      02 Jul 2022 06:00  Phos  2.5     07-01  Mg     2.1     07-02    TPro  6.2  /  Alb  2.1<L>  /  TBili  0.5  /  DBili  x   /  AST  20  /  ALT  9<L>  /  AlkPhos  108  07-01    Hemoglobin A1C:     LIVER FUNCTIONS - ( 01 Jul 2022 06:40 )  Alb: 2.1 g/dL / Pro: 6.2 g/dL / ALK PHOS: 108 U/L / ALT: 9 U/L / AST: 20 U/L / GGT: x           Vitamin B12         RADIOLOGY      ANALYSIS AND PLAN:  This is a 71-year-old with episode of altered mental status.    For episode of altered mental status, suspect this is multifactorial secondary to underlying metabolic encephalopathy along with mild cognitive impairment and subtle dementia becoming more prominent in the hospital setting.  Examination was limited, but I see no clear focality to suggest a new cerebrovascular accident has ensued.  For history of ataxia and falls, most likely multifactorial secondary to age-related changes and neuropathy.  For history of diabetes, strict control of blood sugars.  For history of hypertension, monitor systolic blood pressure.  For hyperlipidemia, statin.  For mild cognitive impairment versus subtle dementia, for now supportive therapy.  appears more interactive today 7/2    Attempted to contact spouse, Geoffrey, at [phone] 7/1    Greater than 45 minutes of time was spent with the patient, plan of care, reviewing data, with greater than 50% of the visit was spent counseling and/or coordinating care with multidisciplinary healthcare team

## 2022-07-02 NOTE — PROGRESS NOTE ADULT - SUBJECTIVE AND OBJECTIVE BOX
Patient is a 71y old  Female who presents with a chief complaint of SIRS (02 Jul 2022 09:33)      INTERVAL HPI/OVERNIGHT EVENTS: Patient seen and examined at bedside. No overnight events occurred. Patient awake and alert today, denies any specific complaints. Patient denies any CP/pressure, sob, abd pain, fevers/chills, n/v/d/c, headache, dizziness/weakness.    MEDICATIONS  (STANDING):  ampicillin/sulbactam  IVPB 3 Gram(s) IV Intermittent every 24 hours  ampicillin/sulbactam  IVPB      aspirin enteric coated 81 milliGRAM(s) Oral daily  atorvastatin 40 milliGRAM(s) Oral at bedtime  chlorhexidine 4% Liquid 1 Application(s) Topical <User Schedule>  cloNIDine 0.1 milliGRAM(s) Oral two times a day  clopidogrel Tablet 75 milliGRAM(s) Oral daily  dextrose 5%. 1000 milliLiter(s) (50 mL/Hr) IV Continuous <Continuous>  dextrose 50% Injectable 25 Gram(s) IV Push once  epoetin fawad-epbx (RETACRIT) Injectable 94581 Unit(s) IV Push <User Schedule>  glucagon  Injectable 1 milliGRAM(s) IntraMuscular once  heparin   Injectable 5000 Unit(s) SubCutaneous every 12 hours  imipramine 50 milliGRAM(s) Oral daily  insulin glargine Injectable (LANTUS) 8 Unit(s) SubCutaneous at bedtime  insulin lispro (ADMELOG) corrective regimen sliding scale   SubCutaneous three times a day before meals  insulin lispro (ADMELOG) corrective regimen sliding scale   SubCutaneous at bedtime  metoprolol tartrate 100 milliGRAM(s) Oral every 12 hours  pantoprazole    Tablet 40 milliGRAM(s) Oral before breakfast  risperiDONE   Tablet 0.5 milliGRAM(s) Oral two times a day    MEDICATIONS  (PRN):  acetaminophen     Tablet .. 650 milliGRAM(s) Oral every 6 hours PRN Temp greater or equal to 38C (100.4F), Mild Pain (1 - 3)  aluminum hydroxide/magnesium hydroxide/simethicone Suspension 30 milliLiter(s) Oral every 4 hours PRN Dyspepsia  dextrose Oral Gel 15 Gram(s) Oral once PRN Blood Glucose LESS THAN 70 milliGRAM(s)/deciliter  melatonin 3 milliGRAM(s) Oral at bedtime PRN Insomnia  OLANZapine Injectable 2.5 milliGRAM(s) IntraMuscular every 6 hours PRN Acute agitation      Allergies    latex (Unknown)  No Known Drug Allergies    Intolerances        REVIEW OF SYSTEMS:  CONSTITUTIONAL: No fever or chills  HEENT:  No headache, no sore throat  RESPIRATORY: No cough, wheezing, or shortness of breath  CARDIOVASCULAR: No chest pain, palpitations  GASTROINTESTINAL: No abd pain, nausea, vomiting, or diarrhea  GENITOURINARY: No dysuria, frequency, or hematuria  NEUROLOGICAL: no focal weakness or dizziness  MUSCULOSKELETAL: no myalgias     Vital Signs Last 24 Hrs  T(C): 36.8 (02 Jul 2022 11:29), Max: 38 (01 Jul 2022 19:42)  T(F): 98.2 (02 Jul 2022 11:29), Max: 100.4 (01 Jul 2022 19:42)  HR: 76 (02 Jul 2022 11:29) (75 - 106)  BP: 146/71 (02 Jul 2022 11:29) (114/73 - 170/69)  BP(mean): --  RR: 18 (02 Jul 2022 11:29) (17 - 20)  SpO2: 93% (02 Jul 2022 11:29) (93% - 100%)    PHYSICAL EXAM:  GENERAL: awake, nad, thin elderly female, pleasant   HEAD:  Atraumatic, Normocephalic  EYES: EOMI, PERRLA, conjunctiva and sclera clear  ENMT: No tonsillar erythema, exudates, or enlargement; dry mucous membranes, symmetrical smile  NECK: Supple, No JVD, Normal thyroid  CHEST/LUNG: Clear to auscultation bilaterally, No wheezing   HEART: S1S2+,  Regular rate and rhythm  ABDOMEN: Soft, Nontender, Nondistended; Bowel sounds present  EXTREMITIES:  2+ Peripheral Pulses, No clubbing, cyanosis  LYMPH: No lymphadenopathy noted  Neuro: A&Ox3, no focal sensory deficits, good motor strength in b/l UE and LE     LABS:                        8.5    10.69 )-----------( 355      ( 02 Jul 2022 06:00 )             26.9     CBC Full  -  ( 02 Jul 2022 06:00 )  WBC Count : 10.69 K/uL  Hemoglobin : 8.5 g/dL  Hematocrit : 26.9 %  Platelet Count - Automated : 355 K/uL  Mean Cell Volume : 92.1 fl  Mean Cell Hemoglobin : 29.1 pg  Mean Cell Hemoglobin Concentration : 31.6 gm/dL  Auto Neutrophil # : x  Auto Lymphocyte # : x  Auto Monocyte # : x  Auto Eosinophil # : x  Auto Basophil # : x  Auto Neutrophil % : x  Auto Lymphocyte % : x  Auto Monocyte % : x  Auto Eosinophil % : x  Auto Basophil % : x    02 Jul 2022 06:00    138    |  100    |  21     ----------------------------<  153    3.9     |  31     |  3.40     Ca    9.0        02 Jul 2022 06:00  Mg     2.1       02 Jul 2022 06:00          CAPILLARY BLOOD GLUCOSE      POCT Blood Glucose.: 146 mg/dL (02 Jul 2022 11:41)  POCT Blood Glucose.: 167 mg/dL (02 Jul 2022 08:02)  POCT Blood Glucose.: 210 mg/dL (01 Jul 2022 21:25)  POCT Blood Glucose.: 218 mg/dL (01 Jul 2022 17:26)  POCT Blood Glucose.: 109 mg/dL (01 Jul 2022 12:40)        Culture - Blood (collected 06-28-22 @ 23:58)  Source: .Blood Blood-Peripheral  Preliminary Report (06-30-22 @ 04:02):    No growth to date.    Culture - Blood (collected 06-28-22 @ 23:53)  Source: .Blood Blood-Peripheral  Preliminary Report (06-30-22 @ 04:02):    No growth to date.    Culture - Blood (collected 06-27-22 @ 11:45)  Source: .Blood Blood-Peripheral  Preliminary Report (06-28-22 @ 17:02):    No growth to date.    Culture - Blood (collected 06-27-22 @ 11:40)  Source: .Blood Blood-Peripheral  Preliminary Report (06-28-22 @ 17:02):    No growth to date.        RADIOLOGY & ADDITIONAL TESTS:    Personally reviewed.     Consultant(s) Notes Reviewed:  [x] YES  [ ] NO

## 2022-07-02 NOTE — PROGRESS NOTE ADULT - ASSESSMENT
70 yo woman with Hx ERSD, PVD, CAD, CABG admitted 6/16 with R hallux gangrene requiring R fem-pop bypass and partial ray amputation.  Course complicated by   episode of junctional bradycardia (likely med related), resulting in cardiogenic shock and encephalopathy with transfer to ICU, later resolved and transferred to  telemetry, now Afib with RVR. \    Problem/Plan :  ·  Problem: Altered Mental Status  ·  Plan: - RESOLVED Patient back to cognitive/mental baseline, A&Ox3 today   - F/u neuro and psych consults, neuro - supportive therapy   -Speech and swallow evaluation - on soft bitesized diet       Problem/Plan :  ·  Problem: Type2 Diabetes, s/p  Hypoglycemia.  ·  Plan: - Patient presented with c/o generalized weakness and fall and was found to have blood sugar in 30's  - Patient with hx of T2DM, on Lantus 40 units qhs not on any oral hypoglycemics per outpatient pharmacy   - Accu checks  - Adjust Insulin dose based upon blood sugar. Endo Dr. Perlman   - continue ISS        Problem/Plan :  ·  Problem: SIRS (systemic inflammatory response syndrome). probably 2/2 to Rt toe infection  ·  Plan: -- CXR: no clear evidence of PNA   - Hx of L medial malleolus growing klebsiella oxytoca/raoutella oxytoca, E. coli, MSSA. Recent blood cultures negative.  - S/P R fem-BK pop w/PTFE POD# 5, S/P Partial ray amputation of R foot  POD #3  - reactive leucocytosis.  - Bone culture growing MSSA  - Changed to Unasyn per ID recs, to continue until July 26  - Requested IR PICC line  - ID recs appreciated    Problem/Plan :  ·  Problem: Pleural effusion.   ·  Plan: - No Hx of pulmonary disease  - Patient does not c/o cough, fever or chills  - CT Cervical shows Bilateral pleural effusions and interlobular septal thickening due to interstitial edema.  - ID Dr. Tsai, consult appreciated      Problem/Plan :  ·  Problem: HFrEF (heart failure with reduced ejection fraction).   ·  Plan: - patient with hx of HFrEF  - last ECHO in 04/22 shows EF of 45%, moderate MR  - Admission labs show ProBNP of 009833  - Likely elevated in the setting of ESRD  - Consult Dr. James, appreciate recs   - Avoid excessive fluids.     Problem/Plan :  ·  Problem: Elevated troponin.   ·  Plan: - Admission labs show elevated Troponin of 275.9  - Patient denies any chest pain, sob or palpitations  - EKG shows NSR with left axis deviation with non specific ST and T waves changes  - elevated troponin likely due to ESRD     Problem/Plan :  ·  Problem: ESRD on hemodialysis.   ·  Plan: - Patient with hx of ESRD  - On HD TTS schedule  - admission eGFR 9  - Follow Dr. Edwards      Problem/Plan :  ·  Problem: T2DM (type 2 diabetes mellitus).   ·  Plan: - Chronic stable  - home dose Lantus 40 qhs  - plan as above     Problem/Plan :  ·  Problem: Atrial fibrillation.   ·  Plan: - Patient with hx of P A.Fib  - Metoprolol 100 q12h  - Not on any AC because of hx of multiple falls   - EKG shows NSR with left axis deviation with non specific ST and T waves changes.     Problem/Plan :  ·  Problem: Benign essential HTN.   ·  Plan: - Chronic stable  - Continue clonidine 0.1mg BID  - Dash/Renal Diet  - continue to monitor routine hemodynamics.     Problem/Plan :  ·  Problem: HLD (hyperlipidemia).   ·  Plan; - Chronic stable  - On atorvastatin 40mg at home - will continue  - Dash/Renal diet.     Problem/Plan :  ·  Problem: Depression, major.   ·  Plan: - Chronic stable  - Continue imipramine 50qhs.     Problem/Plan :  Junctional bradycardia resulting in cardiogenic shock  - Transferred to ICU, now resolved  - Clonidine for htn  - Lopressor 100mg q12h per cardio recs        Problem/Plan :  ·  Problem: Need for prophylactic measure.   ·  Plan: - DVT Prophylaxis: Heparin 5000 units q12.

## 2022-07-02 NOTE — PROGRESS NOTE ADULT - SUBJECTIVE AND OBJECTIVE BOX
CAPILLARY BLOOD GLUCOSE      POCT Blood Glucose.: 210 mg/dL (01 Jul 2022 21:25)  POCT Blood Glucose.: 218 mg/dL (01 Jul 2022 17:26)  POCT Blood Glucose.: 109 mg/dL (01 Jul 2022 12:40)  POCT Blood Glucose.: 221 mg/dL (01 Jul 2022 08:11)      Vital Signs Last 24 Hrs  T(C): 36.9 (02 Jul 2022 04:31), Max: 38 (01 Jul 2022 19:42)  T(F): 98.5 (02 Jul 2022 04:31), Max: 100.4 (01 Jul 2022 19:42)  HR: 83 (02 Jul 2022 04:31) (75 - 106)  BP: 170/69 (02 Jul 2022 04:31) (114/73 - 170/69)  BP(mean): --  RR: 17 (02 Jul 2022 04:31) (17 - 20)  SpO2: 94% (02 Jul 2022 04:31) (94% - 100%)    Respiratory: CTA B/L  CV: RRR, S1S2, no murmurs, rubs or gallops  Abdominal: Soft, NT, ND +BS, Last BM  Extremities: No edema, + peripheral pulses     07-01    138  |  100  |  36<H>  ----------------------------<  192<H>  3.9   |  26  |  4.70<H>    Ca    9.4      01 Jul 2022 06:40  Phos  2.5     07-01  Mg     2.6     07-01    TPro  6.2  /  Alb  2.1<L>  /  TBili  0.5  /  DBili  x   /  AST  20  /  ALT  9<L>  /  AlkPhos  108  07-01      atorvastatin 40 milliGRAM(s) Oral at bedtime  dextrose 50% Injectable 25 Gram(s) IV Push once  dextrose Oral Gel 15 Gram(s) Oral once PRN  glucagon  Injectable 1 milliGRAM(s) IntraMuscular once  insulin glargine Injectable (LANTUS) 8 Unit(s) SubCutaneous at bedtime  insulin lispro (ADMELOG) corrective regimen sliding scale   SubCutaneous at bedtime  insulin lispro (ADMELOG) corrective regimen sliding scale   SubCutaneous three times a day before meals

## 2022-07-02 NOTE — PROGRESS NOTE ADULT - ASSESSMENT
pt with hypoglycemia  elevated troponin due to renal failure  ashd , s/p mi  s/p coronary stent  s/p cabg  chf-hfref  esrd - on hd  dm2  hypertension  paf -not on oac - fall risk  pvd - s/p stent  dyslipidemia   chf - compensated - recent coronary angiogram - patent graft -pt's cradiac status stable low  to intermediate risk for  pvd surgery and amputation of great  toe if needed  6/21  pt tolerated rt femoral -poplitael by-pass 6/21  cardiac status stable low risk for rt big toe amputation 6/23  pt tolerated amputation of left great toe 6/23  pt had bradycardia and hypotension- transferred to icu- had atropine and dopamine-metoprolol and cardizem dc 6/27  pt is in rsr rate 80/min - start po metoprolol

## 2022-07-02 NOTE — PROGRESS NOTE ADULT - SUBJECTIVE AND OBJECTIVE BOX
Patient is a 71y Female whom presented to the hospital with esrd on hd     PAST MEDICAL & SURGICAL HISTORY:  Diabetes mellitus II      HTN (hypertension)      h/o Anxiety attack      Depression      h/o Myocardial infarct 2007      CAD (coronary artery disease)      h/o Hepatitis A 1969  currently resolved, no symptoms      PAD (peripheral artery disease)      Murmur, cardiac      h/o Smoking  quitted 3/2012      CRF (chronic renal failure), unspecified stage      Dialysis patient      Anemia secondary to renal failure      HTN (hypertension)      coronary stent 2007      s/p Ovarian cyst removal      s/p surgical removal of benign Skin lesion epigastric area          MEDICATIONS  (STANDING):  amLODIPine   Tablet 5 milliGRAM(s) Oral daily  aspirin enteric coated 81 milliGRAM(s) Oral daily  atorvastatin 40 milliGRAM(s) Oral at bedtime  calcium acetate 667 milliGRAM(s) Oral three times a day with meals  cefTRIAXone   IVPB 1000 milliGRAM(s) IV Intermittent every 24 hours  cloNIDine 0.1 milliGRAM(s) Oral two times a day  clopidogrel Tablet 75 milliGRAM(s) Oral daily  dextrose 5%. 1000 milliLiter(s) (100 mL/Hr) IV Continuous <Continuous>  dextrose 5%. 1000 milliLiter(s) (50 mL/Hr) IV Continuous <Continuous>  dextrose 50% Injectable 25 Gram(s) IV Push once  dextrose 50% Injectable 12.5 Gram(s) IV Push once  dextrose 50% Injectable 25 Gram(s) IV Push once  diltiazem    Tablet 60 milliGRAM(s) Oral every 8 hours  glucagon  Injectable 1 milliGRAM(s) IntraMuscular once  heparin   Injectable 5000 Unit(s) SubCutaneous every 12 hours  imipramine 50 milliGRAM(s) Oral daily  insulin glargine Injectable (LANTUS) 12 Unit(s) SubCutaneous at bedtime  insulin lispro (ADMELOG) corrective regimen sliding scale   SubCutaneous three times a day before meals  insulin lispro (ADMELOG) corrective regimen sliding scale   SubCutaneous at bedtime  metoprolol tartrate 100 milliGRAM(s) Oral every 12 hours  pantoprazole    Tablet 40 milliGRAM(s) Oral before breakfast  povidone iodine 10% Solution 1 Application(s) Topical daily      Allergies    latex (Unknown)  No Known Drug Allergies    Intolerances        SOCIAL HISTORY:  Denies ETOh,Smoking,     FAMILY HISTORY:  No pertinent family history in first degree relatives        REVIEW OF SYSTEMS:    CONSTITUTIONAL: No weakness, fevers or chills  RESPIRATORY: No cough, wheezing, hemoptysis; No shortness of breath  CARDIOVASCULAR: No chest pain or palpitations                            8.5    10.69 )-----------( 355      ( 02 Jul 2022 06:00 )             26.9       CBC Full  -  ( 02 Jul 2022 06:00 )  WBC Count : 10.69 K/uL  RBC Count : 2.92 M/uL  Hemoglobin : 8.5 g/dL  Hematocrit : 26.9 %  Platelet Count - Automated : 355 K/uL  Mean Cell Volume : 92.1 fl  Mean Cell Hemoglobin : 29.1 pg  Mean Cell Hemoglobin Concentration : 31.6 gm/dL  Auto Neutrophil # : x  Auto Lymphocyte # : x  Auto Monocyte # : x  Auto Eosinophil # : x  Auto Basophil # : x  Auto Neutrophil % : x  Auto Lymphocyte % : x  Auto Monocyte % : x  Auto Eosinophil % : x  Auto Basophil % : x      07-02    138  |  100  |  21  ----------------------------<  153<H>  3.9   |  31  |  3.40<H>    Ca    9.0      02 Jul 2022 06:00  Phos  2.5     07-01  Mg     2.1     07-02    TPro  6.2  /  Alb  2.1<L>  /  TBili  0.5  /  DBili  x   /  AST  20  /  ALT  9<L>  /  AlkPhos  108  07-01      CAPILLARY BLOOD GLUCOSE      POCT Blood Glucose.: 146 mg/dL (02 Jul 2022 11:41)  POCT Blood Glucose.: 167 mg/dL (02 Jul 2022 08:02)  POCT Blood Glucose.: 210 mg/dL (01 Jul 2022 21:25)  POCT Blood Glucose.: 218 mg/dL (01 Jul 2022 17:26)      Vital Signs Last 24 Hrs  T(C): 36.8 (02 Jul 2022 11:29), Max: 38 (01 Jul 2022 19:42)  T(F): 98.2 (02 Jul 2022 11:29), Max: 100.4 (01 Jul 2022 19:42)  HR: 76 (02 Jul 2022 11:29) (76 - 106)  BP: 146/71 (02 Jul 2022 11:29) (118/69 - 170/69)  BP(mean): --  RR: 18 (02 Jul 2022 11:29) (17 - 19)  SpO2: 93% (02 Jul 2022 11:29) (93% - 94%)                                                                                                                                                PHYSICAL EXAM:    Constitutional: NAD  HEENT: conjunctive   clear   Neck:  No JVD  Respiratory: CTAB  Cardiovascular: S1 and S2  Gastrointestinal: BS+, soft, NT/ND  Skin: dry

## 2022-07-03 LAB
ANION GAP SERPL CALC-SCNC: 8 MMOL/L — SIGNIFICANT CHANGE UP (ref 5–17)
BUN SERPL-MCNC: 39 MG/DL — HIGH (ref 7–23)
CALCIUM SERPL-MCNC: 9.4 MG/DL — SIGNIFICANT CHANGE UP (ref 8.5–10.1)
CHLORIDE SERPL-SCNC: 99 MMOL/L — SIGNIFICANT CHANGE UP (ref 96–108)
CO2 SERPL-SCNC: 31 MMOL/L — SIGNIFICANT CHANGE UP (ref 22–31)
CREAT SERPL-MCNC: 4.7 MG/DL — HIGH (ref 0.5–1.3)
EGFR: 9 ML/MIN/1.73M2 — LOW
GLUCOSE SERPL-MCNC: 134 MG/DL — HIGH (ref 70–99)
HCT VFR BLD CALC: 28.3 % — LOW (ref 34.5–45)
HGB BLD-MCNC: 8.8 G/DL — LOW (ref 11.5–15.5)
MCHC RBC-ENTMCNC: 28.8 PG — SIGNIFICANT CHANGE UP (ref 27–34)
MCHC RBC-ENTMCNC: 31.1 GM/DL — LOW (ref 32–36)
MCV RBC AUTO: 92.5 FL — SIGNIFICANT CHANGE UP (ref 80–100)
NRBC # BLD: 0 /100 WBCS — SIGNIFICANT CHANGE UP (ref 0–0)
PLATELET # BLD AUTO: 404 K/UL — HIGH (ref 150–400)
POTASSIUM SERPL-MCNC: 4.5 MMOL/L — SIGNIFICANT CHANGE UP (ref 3.5–5.3)
POTASSIUM SERPL-SCNC: 4.5 MMOL/L — SIGNIFICANT CHANGE UP (ref 3.5–5.3)
RBC # BLD: 3.06 M/UL — LOW (ref 3.8–5.2)
RBC # FLD: 18.1 % — HIGH (ref 10.3–14.5)
SODIUM SERPL-SCNC: 138 MMOL/L — SIGNIFICANT CHANGE UP (ref 135–145)
WBC # BLD: 11.79 K/UL — HIGH (ref 3.8–10.5)
WBC # FLD AUTO: 11.79 K/UL — HIGH (ref 3.8–10.5)

## 2022-07-03 PROCEDURE — 99233 SBSQ HOSP IP/OBS HIGH 50: CPT | Mod: GC

## 2022-07-03 RX ORDER — ERYTHROPOIETIN 10000 [IU]/ML
10000 INJECTION, SOLUTION INTRAVENOUS; SUBCUTANEOUS
Refills: 0 | Status: DISCONTINUED | OUTPATIENT
Start: 2022-07-03 | End: 2022-07-07

## 2022-07-03 RX ADMIN — Medication 50 MILLIGRAM(S): at 11:36

## 2022-07-03 RX ADMIN — RISPERIDONE 0.5 MILLIGRAM(S): 4 TABLET ORAL at 17:15

## 2022-07-03 RX ADMIN — Medication 2: at 12:30

## 2022-07-03 RX ADMIN — CLOPIDOGREL BISULFATE 75 MILLIGRAM(S): 75 TABLET, FILM COATED ORAL at 11:36

## 2022-07-03 RX ADMIN — INSULIN GLARGINE 8 UNIT(S): 100 INJECTION, SOLUTION SUBCUTANEOUS at 21:21

## 2022-07-03 RX ADMIN — Medication 100 MILLIGRAM(S): at 04:41

## 2022-07-03 RX ADMIN — Medication 100 MILLIGRAM(S): at 17:15

## 2022-07-03 RX ADMIN — Medication 0.1 MILLIGRAM(S): at 17:15

## 2022-07-03 RX ADMIN — PANTOPRAZOLE SODIUM 40 MILLIGRAM(S): 20 TABLET, DELAYED RELEASE ORAL at 04:42

## 2022-07-03 RX ADMIN — Medication 0.1 MILLIGRAM(S): at 04:41

## 2022-07-03 RX ADMIN — Medication 2: at 17:14

## 2022-07-03 RX ADMIN — RISPERIDONE 0.5 MILLIGRAM(S): 4 TABLET ORAL at 04:42

## 2022-07-03 RX ADMIN — AMPICILLIN SODIUM AND SULBACTAM SODIUM 200 GRAM(S): 250; 125 INJECTION, POWDER, FOR SUSPENSION INTRAMUSCULAR; INTRAVENOUS at 20:11

## 2022-07-03 RX ADMIN — Medication 3 MILLIGRAM(S): at 21:21

## 2022-07-03 RX ADMIN — HEPARIN SODIUM 5000 UNIT(S): 5000 INJECTION INTRAVENOUS; SUBCUTANEOUS at 04:42

## 2022-07-03 RX ADMIN — CHLORHEXIDINE GLUCONATE 1 APPLICATION(S): 213 SOLUTION TOPICAL at 04:43

## 2022-07-03 RX ADMIN — HEPARIN SODIUM 5000 UNIT(S): 5000 INJECTION INTRAVENOUS; SUBCUTANEOUS at 17:15

## 2022-07-03 RX ADMIN — Medication 81 MILLIGRAM(S): at 11:36

## 2022-07-03 RX ADMIN — ATORVASTATIN CALCIUM 40 MILLIGRAM(S): 80 TABLET, FILM COATED ORAL at 21:21

## 2022-07-03 NOTE — PROGRESS NOTE ADULT - SUBJECTIVE AND OBJECTIVE BOX
Patient is a 71y Female with a known history of :  SIRS (systemic inflammatory response syndrome) [R65.10]    Hypoglycemia [E16.2]    Pleural effusion [J90]    CHF, acute [I50.9]    Chronic CHF [I50.9]    Elevated troponin [R77.8]    Atrial fibrillation [I48.91]    Benign essential HTN [I10]    HLD (hyperlipidemia) [E78.5]    Depression, major [F32.9]    Need for prophylactic measure [Z29.9]    ESRD on hemodialysis [N18.6]    T2DM (type 2 diabetes mellitus) [E11.9]    HFrEF (heart failure with reduced ejection fraction) [I50.20]    Gangrene of toe of right foot [I96]    Atherosclerotic PVD with ulceration [I70.209]    PVD (peripheral vascular disease) [I73.9]      HPI:  Patient is a 71 year old female with PMH of A.fib rate controlled not on AC for hx of multiple falls, T2DM, HTN, HLD, ESRD on HD, CHF, s/p CABG brought in by EMS for generalized weakness at home. Patient states that she was doing well when in the afternoon she tried to get up from her couch and felt weak in the LE and fell back in her couch. Denies any hx of head trauma or loss of consciousness. Denies any weakness or numbness. Denies any chest pain, SOB or palpitations. No fever, cough or chills. No hx of recent travel or sick contacts. At the time of EMS arrival patient was found to have POCBS of 50. EMS gave dextrose and on arrival to the ED patient blood sugar was found to be in 30's with hypothermia of 94.9.    ED course:   Vitals:   BP: 176/85  HR:76  Temp:94.2  RR:18, O2:96% on RA   Significant Labs:   CBC: WBC:16.82 Hb:13.2 , Platlets: 260, Neutro:89   CMP: Lytes: WNL, BUN/Creatinine:48/4.8, Glucose:134 , Alkaline Phos:128, AST, 41, eGFR: 9, HsTrop: 275.9, ProBNP: 600200, Lactate: 2  UA; negative  CXR: Heart and lung appear normal (self read)  CT BRAIN: No acute intracranial bleeding. Central volume loss, chronic microvascular ischemic changes, and chronic bilateral lacunar infarctions.  CT CERVICAL SPINE: No fracture. Grade 1 C4-C5 anterolisthesis. C6-C7 disc degeneration. Bilateral pleural effusions and interlobular septal thickening due to   interstitial edema.  EKG: NSR, left axis deviation, HR 71,   QT/QTc: 426/462  Patient received: Ceftriaxone 1 g x1, Dextrose 5% + NS 1L x1, Dextrose 50% IV X1, heating blanket, 2L NC   (16 Jun 2022 01:19)      REVIEW OF SYSTEMS:    CONSTITUTIONAL: No fever, weight loss, or fatigue  EYES: No eye pain, visual disturbances, or discharge  ENMT:  No difficulty hearing, tinnitus, vertigo; No sinus or throat pain  NECK: No pain or stiffness  BREASTS: No pain, masses, or nipple discharge  RESPIRATORY: No cough, wheezing, chills or hemoptysis; No shortness of breath  CARDIOVASCULAR: No chest pain, palpitations, dizziness, or leg swelling  GASTROINTESTINAL: No abdominal or epigastric pain. No nausea, vomiting, or hematemesis; No diarrhea or constipation. No melena or hematochezia.  GENITOURINARY: No dysuria, frequency, hematuria, or incontinence  NEUROLOGICAL: No headaches, memory loss, loss of strength, numbness, or tremors  SKIN: No itching, burning, rashes, or lesions   LYMPH NODES: No enlarged glands  ENDOCRINE: No heat or cold intolerance; No hair loss  MUSCULOSKELETAL: No joint pain or swelling; No muscle, back, or extremity pain  PSYCHIATRIC: No depression, anxiety, mood swings, or difficulty sleeping  HEME/LYMPH: No easy bruising, or bleeding gums  ALLERGY AND IMMUNOLOGIC: No hives or eczema    MEDICATIONS  (STANDING):  ampicillin/sulbactam  IVPB 3 Gram(s) IV Intermittent every 24 hours  ampicillin/sulbactam  IVPB      aspirin enteric coated 81 milliGRAM(s) Oral daily  atorvastatin 40 milliGRAM(s) Oral at bedtime  chlorhexidine 4% Liquid 1 Application(s) Topical <User Schedule>  cloNIDine 0.1 milliGRAM(s) Oral two times a day  clopidogrel Tablet 75 milliGRAM(s) Oral daily  dextrose 5%. 1000 milliLiter(s) (50 mL/Hr) IV Continuous <Continuous>  dextrose 50% Injectable 25 Gram(s) IV Push once  epoetin fawad-epbx (RETACRIT) Injectable 56117 Unit(s) IV Push <User Schedule>  glucagon  Injectable 1 milliGRAM(s) IntraMuscular once  heparin   Injectable 5000 Unit(s) SubCutaneous every 12 hours  imipramine 50 milliGRAM(s) Oral daily  insulin glargine Injectable (LANTUS) 8 Unit(s) SubCutaneous at bedtime  insulin lispro (ADMELOG) corrective regimen sliding scale   SubCutaneous three times a day before meals  insulin lispro (ADMELOG) corrective regimen sliding scale   SubCutaneous at bedtime  metoprolol tartrate 100 milliGRAM(s) Oral every 12 hours  pantoprazole    Tablet 40 milliGRAM(s) Oral before breakfast  [id number]   Tablet 0.5 milliGRAM(s) Oral two times a day    MEDICATIONS  (PRN):  acetaminophen     Tablet .. 650 milliGRAM(s) Oral every 6 hours PRN Temp greater or equal to 38C (100.4F), Mild Pain (1 - 3)  aluminum hydroxide/magnesium hydroxide/simethicone Suspension 30 milliLiter(s) Oral every 4 hours PRN Dyspepsia  dextrose Oral Gel 15 Gram(s) Oral once PRN Blood Glucose LESS THAN 70 milliGRAM(s)/deciliter  melatonin 3 milliGRAM(s) Oral at bedtime PRN Insomnia  OLANZapine Injectable 2.5 milliGRAM(s) IntraMuscular every 6 hours PRN Acute agitation      ALLERGIES: latex (Unknown)  No Known Drug Allergies      FAMILY HISTORY:  No pertinent family history in first degree relatives        PHYSICAL EXAMINATION:  -----------------------------  T(C): 36.5 (07-03-22 @ 04:37), Max: 36.8 (07-02-22 @ 11:29)  HR: 81 (07-03-22 @ 04:37) (76 - 81)  BP: 163/79 (07-03-22 @ 04:37) (129/63 - 163/79)  RR: 17 (07-03-22 @ 04:37) (17 - 18)  SpO2: 90% (07-03-22 @ 04:37) (90% - 97%)  Wt(kg): --        VITALS  T(C): 36.5 (07-03-22 @ 04:37), Max: 36.8 (07-02-22 @ 11:29)  HR: 81 (07-03-22 @ 04:37) (76 - 81)  BP: 163/79 (07-03-22 @ 04:37) (129/63 - 163/79)  RR: 17 (07-03-22 @ 04:37) (17 - 18)  SpO2: 90% (07-03-22 @ 04:37) (90% - 97%)    Constitutional: well developed, normal appearance, well groomed, well nourished, no deformities and no acute distress.   Eyes: the conjunctiva exhibited no abnormalities and the eyelids demonstrated no xanthelasmas.   HEENT: normal oral mucosa, no oral pallor and no oral cyanosis.   Neck: normal jugular venous A waves present, normal jugular venous V waves present and no jugular venous wilson A waves.   Pulmonary: no respiratory distress, normal respiratory rhythm and effort, no accessory muscle use and lungs were clear to auscultation bilaterally.   Cardiovascular: heart rate and rhythm were normal, normal S1 and S2 and no murmur, gallop, rub, heave or thrill are present.   Abdomen: soft, non-tender, no hepato-splenomegaly and no abdominal mass palpated.   Musculoskeletal: the gait could not be assessed..   Extremities: no clubbing of the fingernails, no localized cyanosis, no petechial hemorrhages and no ischemic changes.   Skin: normal skin color and pigmentation, no rash, no venous stasis, no skin lesions, no skin ulcer and no xanthoma was observed.   Psychiatric: oriented to person, place, and time, the affect was normal, the mood was normal and not feeling anxious.     LABS:   --------  07-03    138  |  99  |  39<H>  ----------------------------<  134<H>  4.5   |  31  |  4.70<H>    Ca    9.4      03 Jul 2022 05:10  Mg     2.1     07-02                           8.8    11.79 )-----------( 404      ( 03 Jul 2022 05:10 )             28.3                 RADIOLOGY:  -----------------    ECG:     ECHO:

## 2022-07-03 NOTE — PROGRESS NOTE ADULT - ASSESSMENT
72 yo woman with Hx ERSD, PVD, CAD, CABG admitted 6/16 with R hallux gangrene requiring R fem-pop bypass and partial ray amputation.  Course complicated by   episode of junctional bradycardia (likely med related), resulting in cardiogenic shock and encephalopathy with transfer to ICU, later resolved and transferred to  telemetry, now Afib with RVR. \    Problem/Plan :  ·  Problem: Altered Mental Status  · RESOLVED Patient back to cognitive/mental baseline, A&Ox3 today 7/3   - F/u neuro and psych consults, neuro - supportive therapy   -Speech and swallow evaluation - on soft bitesized diet       Problem/Plan :  ·  Problem: Type2 Diabetes, s/p  Hypoglycemia.  ·  Plan: - Patient presented with c/o generalized weakness and fall and was found to have blood sugar in 30's  - Patient with hx of T2DM, on Lantus 40 units qhs not on any oral hypoglycemics per outpatient pharmacy   - Accu checks  - Adjust Insulin dose based upon blood sugar. Endo Dr. Perlman   - continue ISS        Problem/Plan :  ·  Problem: SIRS (systemic inflammatory response syndrome). probably 2/2 to Rt toe infection  ·  Plan: -- CXR: no clear evidence of PNA   - Hx of L medial malleolus growing klebsiella oxytoca/raoutella oxytoca, E. coli, MSSA. Recent blood cultures negative.  - S/P R fem-BK pop w/PTFE POD# 5, S/P Partial ray amputation of R foot  POD #3  - reactive leucocytosis WBC 11.79 (7/3)  - Bone culture growing MSSA  - Blood culture NGTD  - Unasyn per ID recs, to continue until July 26  - Requested IR PICC line  - ID recs appreciated    Problem/Plan :  ·  Problem: Pleural effusion.   ·  Plan: - No Hx of pulmonary disease  - Patient does not c/o cough, fever or chills  - CT Cervical shows Bilateral pleural effusions and interlobular septal thickening due to interstitial edema.  - ID Dr. Tsai, consult appreciated      Problem/Plan :  ·  Problem: HFrEF (heart failure with reduced ejection fraction).   ·  Plan: - patient with hx of HFrEF  - last ECHO in 04/22 shows EF of 45%, moderate MR  - Admission labs show ProBNP of 891383  - Likely elevated in the setting of ESRD  - Consult Dr. James, appreciate recs   - Avoid excessive fluids.     Problem/Plan :  ·  Problem: Elevated troponin.   ·  Plan: - Admission labs show elevated Troponin of 275.9  - Patient denies any chest pain, sob or palpitations  - EKG shows NSR with left axis deviation with non specific ST and T waves changes  - elevated troponin likely due to ESRD     Problem/Plan :  ·  Problem: ESRD on hemodialysis.   ·  Plan: - Patient with hx of ESRD  - On HD TTS schedule  - admission eGFR 9  - continue epoetin-fawad  - Follow Dr. Edwards      Problem/Plan :  ·  Problem: T2DM (type 2 diabetes mellitus).   ·  Plan: - Chronic stable  - home dose Lantus 40 qhs  - plan as above     Problem/Plan :  ·  Problem: Atrial fibrillation.   ·  Plan: - Patient with hx of P A.Fib  - Metoprolol 100 q12h  - Not on any AC because of hx of multiple falls   - EKG shows NSR with left axis deviation with non specific ST and T waves changes.     Problem/Plan :  ·  Problem: Benign essential HTN.   ·  Plan: - Chronic stable  - Continue clonidine 0.1mg BID  - Dash/Renal Diet  - continue to monitor routine hemodynamics.     Problem/Plan :  ·  Problem: HLD (hyperlipidemia).   ·  Plan; - Chronic stable  - On atorvastatin 40mg at home - will continue  - Dash/Renal diet.     Problem/Plan :  ·  Problem: Depression, major.   ·  Plan: - Chronic stable  - Continue imipramine 50qhs.     Problem/Plan :  Junctional bradycardia resulting in cardiogenic shock  - Transferred to ICU, now resolved  - Clonidine for htn  - Lopressor 100mg q12h per cardio recs        Problem/Plan :  ·  Problem: Need for prophylactic measure.   ·  Plan: - DVT Prophylaxis: Heparin 5000 units q12.

## 2022-07-03 NOTE — PROGRESS NOTE ADULT - SUBJECTIVE AND OBJECTIVE BOX
70 y/o female seen at bedside. Pt doing well, AAOx3. Pt resting in bed comfortably. Dressing to the right foot clean, dry, and intact.     MEDICATIONS  (STANDING):  ampicillin/sulbactam  IVPB 3 Gram(s) IV Intermittent every 24 hours  ampicillin/sulbactam  IVPB      aspirin enteric coated 81 milliGRAM(s) Oral daily  atorvastatin 40 milliGRAM(s) Oral at bedtime  chlorhexidine 4% Liquid 1 Application(s) Topical <User Schedule>  cloNIDine 0.1 milliGRAM(s) Oral two times a day  clopidogrel Tablet 75 milliGRAM(s) Oral daily  dextrose 5%. 1000 milliLiter(s) (50 mL/Hr) IV Continuous <Continuous>  dextrose 50% Injectable 25 Gram(s) IV Push once  epoetin fawad-epbx (RETACRIT) Injectable 35063 Unit(s) IV Push <User Schedule>  glucagon  Injectable 1 milliGRAM(s) IntraMuscular once  heparin   Injectable 5000 Unit(s) SubCutaneous every 12 hours  imipramine 50 milliGRAM(s) Oral daily  insulin glargine Injectable (LANTUS) 8 Unit(s) SubCutaneous at bedtime  insulin lispro (ADMELOG) corrective regimen sliding scale   SubCutaneous three times a day before meals  insulin lispro (ADMELOG) corrective regimen sliding scale   SubCutaneous at bedtime  metoprolol tartrate 100 milliGRAM(s) Oral every 12 hours  pantoprazole    Tablet 40 milliGRAM(s) Oral before breakfast  risperiDONE   Tablet 0.5 milliGRAM(s) Oral two times a day    MEDICATIONS  (PRN):  acetaminophen     Tablet .. 650 milliGRAM(s) Oral every 6 hours PRN Temp greater or equal to 38C (100.4F), Mild Pain (1 - 3)  aluminum hydroxide/magnesium hydroxide/simethicone Suspension 30 milliLiter(s) Oral every 4 hours PRN Dyspepsia  dextrose Oral Gel 15 Gram(s) Oral once PRN Blood Glucose LESS THAN 70 milliGRAM(s)/deciliter  melatonin 3 milliGRAM(s) Oral at bedtime PRN Insomnia  OLANZapine Injectable 2.5 milliGRAM(s) IntraMuscular every 6 hours PRN Acute agitation    Vital Signs Last 24 Hrs  T(C): 36.8 (03 Jul 2022 12:00), Max: 36.8 (03 Jul 2022 12:00)  T(F): 98.2 (03 Jul 2022 12:00), Max: 98.2 (03 Jul 2022 12:00)  HR: 75 (03 Jul 2022 12:00) (75 - 81)  BP: 157/68 (03 Jul 2022 12:00) (129/63 - 163/79)  BP(mean): --  RR: 18 (03 Jul 2022 12:00) (17 - 18)  SpO2: 93% (03 Jul 2022 12:00) (90% - 97%)    CBC Full  -  ( 03 Jul 2022 05:10 )  WBC Count : 11.79 K/uL  RBC Count : 3.06 M/uL  Hemoglobin : 8.8 g/dL  Hematocrit : 28.3 %  Platelet Count - Automated : 404 K/uL  Mean Cell Volume : 92.5 fl  Mean Cell Hemoglobin : 28.8 pg  Mean Cell Hemoglobin Concentration : 31.1 gm/dL  Auto Neutrophil # : x  Auto Lymphocyte # : x  Auto Monocyte # : x  Auto Eosinophil # : x  Auto Basophil # : x  Auto Neutrophil % : x  Auto Lymphocyte % : x  Auto Monocyte % : x  Auto Eosinophil % : x  Auto Basophil % : x      Allergies    latex (Unknown)  No Known Drug Allergies    Intolerances      PHYSICAL EXAM:  Vascular: Right DP & PT dopplerable.  Capillary refill (CFT) 3 seconds. Digital hair absent bilaterally.   Neurological: Light touch sensation diminished bilaterally.  Musculoskeletal: s/p right hallux amputation. Strength in all quadrants bilaterally, AJ & STJ ROM absent b/l.   Dermatological: sutures to right hallux intact. No signs of wound dehiscence. No signs of infection.       Culture - Tissue with Gram Stain (collected 23 Jun 2022 15:35)  Source: .Tissue Other, right hallux bone culture  Gram Stain (24 Jun 2022 04:37):    Rare polymorphonuclear leukocytes seen per low power field    Few Gram positive cocci in pairs seen per oil power field        ACCESSION No:  30 [id number]  Patient:   SHILO KOHLER      Accession:                             [id number]    Collected Date/Time:                   6/23/2022 15:35 EDT  Received Date/Time:                    6/24/2022 07:44 EDT    Surgical Pathology Report - Auth (Verified)    Specimen(s) Submitted  1  Right hallux pathology  2  Right first metatarsal/clean margin pathology    Final Diagnosis  1. Right hallux  -Acute and chronic osteomyelitis.  -Gangrenous skin and soft tissue.    2. Right first metatarsal/clean margin:  -Minimal chronic osteomyelitis.    Verified by: Randall Rodriguez MD  (Electronic Signature)  Reported on: 06/27/22 12:18 EDT, [facility], [address] [place] [address]

## 2022-07-03 NOTE — PROGRESS NOTE ADULT - SUBJECTIVE AND OBJECTIVE BOX
Neurology follow up note    SHILO [id number]      Interval History:    Patient feels ok no new complaints.    Allergies    latex (Unknown)  No Known Drug Allergies    Intolerances        MEDICATIONS    acetaminophen     Tablet .. 650 milliGRAM(s) Oral every 6 hours PRN  aluminum hydroxide/magnesium hydroxide/simethicone Suspension 30 milliLiter(s) Oral every 4 hours PRN  ampicillin/sulbactam  IVPB 3 Gram(s) IV Intermittent every 24 hours  ampicillin/sulbactam  IVPB      aspirin enteric coated 81 milliGRAM(s) Oral daily  atorvastatin 40 milliGRAM(s) Oral at bedtime  chlorhexidine 4% Liquid 1 Application(s) Topical <User Schedule>  cloNIDine 0.1 milliGRAM(s) Oral two times a day  clopidogrel Tablet 75 milliGRAM(s) Oral daily  dextrose 5%. 1000 milliLiter(s) IV Continuous <Continuous>  dextrose 50% Injectable 25 Gram(s) IV Push once  dextrose Oral Gel 15 Gram(s) Oral once PRN  epoetin fawad-epbx (RETACRIT) Injectable 60389 Unit(s) IV Push <User Schedule>  glucagon  Injectable 1 milliGRAM(s) IntraMuscular once  heparin   Injectable 5000 Unit(s) SubCutaneous every 12 hours  imipramine 50 milliGRAM(s) Oral daily  insulin glargine Injectable (LANTUS) 8 Unit(s) SubCutaneous at bedtime  insulin lispro (ADMELOG) corrective regimen sliding scale   SubCutaneous three times a day before meals  insulin lispro (ADMELOG) corrective regimen sliding scale   SubCutaneous at bedtime  melatonin 3 milliGRAM(s) Oral at bedtime PRN  metoprolol tartrate 100 milliGRAM(s) Oral every 12 hours  OLANZapine Injectable 2.5 milliGRAM(s) IntraMuscular every 6 hours PRN  pantoprazole    Tablet 40 milliGRAM(s) Oral before breakfast  risperiDONE   Tablet 0.5 milliGRAM(s) Oral two times a day              Vital Signs Last 24 Hrs  T(C): 36.5 (03 Jul 2022 04:37), Max: 36.8 (02 Jul 2022 11:29)  T(F): 97.7 (03 Jul 2022 04:37), Max: 98.2 (02 Jul 2022 11:29)  HR: 81 (03 Jul 2022 04:37) (76 - 81)  BP: 163/79 (03 Jul 2022 04:37) (129/63 - 163/79)  BP(mean): --  RR: 17 (03 Jul 2022 04:37) (17 - 18)  SpO2: 90% (03 Jul 2022 04:37) (90% - 97%)      REVIEW OF SYSTEMS:  Slightly limited secondary to the patient being altered, but constitutionally, she denies fever, chills, or night sweats.  Head:  No headaches.  Eyes:  No double vision or blurry vision.  Ears:  No ringing in the ears.  Neck:  No neck pain.  Cardiovascular:  No chest pain.  Respiratory:  No shortness of breath.  Abdomen:  No nausea, vomiting, or abdominal pain.  Extremities/Neurological:  No numbness or tingling.  Musculoskeletal:  No joint pain.      PHYSICAL EXAMINATION:   HEENT:  Head:  Normocephalic, atraumatic.  Eyes:  No scleral icterus.  Ears:  Hearing appeared to be intact.  NECK:  Supple.  CARDIOVASCULAR:  S1 and S2 are heard.  RESPIRATORY:  Decreased breath sounds bilaterally, gives poor effort.  ABDOMEN:  Soft, nontender.  EXTREMITIES:  No clubbing or cyanosis was noted.      NEUROLOGIC:  The patient was arousable to verbal stimuli.  Location unknown, year was 2004, was able to name the number of fingers in each eye.  Pupils bilaterally were 2 mm, slightly reactive.  Speech was fluent.  Smile symmetric.  Motor, bilateral upper 4/5, bilateral lower 4-/5.  The patient, per previous note, does have significantly impaired proprioception, bilateral extremities, and knee jerks were trace, suspect this is from underlying diabetes.                  LABS:  CBC Full  -  ( 03 Jul 2022 05:10 )  WBC Count : 11.79 K/uL  RBC Count : 3.06 M/uL  Hemoglobin : 8.8 g/dL  Hematocrit : 28.3 %  Platelet Count - Automated : 404 K/uL  Mean Cell Volume : 92.5 fl  Mean Cell Hemoglobin : 28.8 pg  Mean Cell Hemoglobin Concentration : 31.1 gm/dL  Auto Neutrophil # : x  Auto Lymphocyte # : x  Auto Monocyte # : x  Auto Eosinophil # : x  Auto Basophil # : x  Auto Neutrophil % : x  Auto Lymphocyte % : x  Auto Monocyte % : x  Auto Eosinophil % : x  Auto Basophil % : x      07-03    138  |  99  |  39<H>  ----------------------------<  134<H>  4.5   |  31  |  4.70<H>    Ca    9.4      03 Jul 2022 05:10  Mg     2.1     07-02      Hemoglobin A1C:       Vitamin B12         RADIOLOGY      ANALYSIS AND PLAN:  This is a 71-year-old with episode of altered mental status.    For episode of altered mental status, suspect this is multifactorial secondary to underlying metabolic encephalopathy along with mild cognitive impairment and subtle dementia becoming more prominent in the hospital setting.  Examination was limited, but I see no clear focality to suggest a new cerebrovascular accident has ensued.  For history of ataxia and falls, most likely multifactorial secondary to age-related changes and neuropathy.  For history of diabetes, strict control of blood sugars.  For history of hypertension, monitor systolic blood pressure.  For hyperlipidemia, statin.  For mild cognitive impairment versus subtle dementia, for now supportive therapy.  appears more interactive today 7/2    spoke to spouse, Geoffrey, at [phone] 7/1    Greater than 45 minutes of time was spent with the patient, plan of care, reviewing data, with greater than 50% of the visit was spent counseling and/or coordinating care with multidisciplinary healthcare team       Neurology follow up note    SHILO [id number]      Interval History:    Patient feels ok no new complaints.    Allergies    latex (Unknown)  No Known Drug Allergies    Intolerances        MEDICATIONS    acetaminophen     Tablet .. 650 milliGRAM(s) Oral every 6 hours PRN  aluminum hydroxide/magnesium hydroxide/simethicone Suspension 30 milliLiter(s) Oral every 4 hours PRN  ampicillin/sulbactam  IVPB 3 Gram(s) IV Intermittent every 24 hours  ampicillin/sulbactam  IVPB      aspirin enteric coated 81 milliGRAM(s) Oral daily  atorvastatin 40 milliGRAM(s) Oral at bedtime  chlorhexidine 4% Liquid 1 Application(s) Topical <User Schedule>  cloNIDine 0.1 milliGRAM(s) Oral two times a day  clopidogrel Tablet 75 milliGRAM(s) Oral daily  dextrose 5%. 1000 milliLiter(s) IV Continuous <Continuous>  dextrose 50% Injectable 25 Gram(s) IV Push once  dextrose Oral Gel 15 Gram(s) Oral once PRN  epoetin fawad-epbx (RETACRIT) Injectable 64904 Unit(s) IV Push <User Schedule>  glucagon  Injectable 1 milliGRAM(s) IntraMuscular once  heparin   Injectable 5000 Unit(s) SubCutaneous every 12 hours  imipramine 50 milliGRAM(s) Oral daily  insulin glargine Injectable (LANTUS) 8 Unit(s) SubCutaneous at bedtime  insulin lispro (ADMELOG) corrective regimen sliding scale   SubCutaneous three times a day before meals  insulin lispro (ADMELOG) corrective regimen sliding scale   SubCutaneous at bedtime  melatonin 3 milliGRAM(s) Oral at bedtime PRN  metoprolol tartrate 100 milliGRAM(s) Oral every 12 hours  OLANZapine Injectable 2.5 milliGRAM(s) IntraMuscular every 6 hours PRN  pantoprazole    Tablet 40 milliGRAM(s) Oral before breakfast  risperiDONE   Tablet 0.5 milliGRAM(s) Oral two times a day              Vital Signs Last 24 Hrs  T(C): 36.5 (03 Jul 2022 04:37), Max: 36.8 (02 Jul 2022 11:29)  T(F): 97.7 (03 Jul 2022 04:37), Max: 98.2 (02 Jul 2022 11:29)  HR: 81 (03 Jul 2022 04:37) (76 - 81)  BP: 163/79 (03 Jul 2022 04:37) (129/63 - 163/79)  BP(mean): --  RR: 17 (03 Jul 2022 04:37) (17 - 18)  SpO2: 90% (03 Jul 2022 04:37) (90% - 97%)      REVIEW OF SYSTEMS:  Slightly limited secondary to the patient being altered, but constitutionally, she denies fever, chills, or night sweats.  Head:  No headaches.  Eyes:  No double vision or blurry vision.  Ears:  No ringing in the ears.  Neck:  No neck pain.  Cardiovascular:  No chest pain.  Respiratory:  No shortness of breath.  Abdomen:  No nausea, vomiting, or abdominal pain.  Extremities/Neurological:  No numbness or tingling.  Musculoskeletal:  No joint pain.      PHYSICAL EXAMINATION:   HEENT:  Head:  Normocephalic, atraumatic.  Eyes:  No scleral icterus.  Ears:  Hearing appeared to be intact.  NECK:  Supple.  CARDIOVASCULAR:  S1 and S2 are heard.  RESPIRATORY:  Decreased breath sounds bilaterally, gives poor effort.  ABDOMEN:  Soft, nontender.  EXTREMITIES:  No clubbing or cyanosis was noted.      NEUROLOGIC:  The patient was arousable to verbal stimuli.  Location unknown, year was 2004, was able to name the number of fingers in each eye.  Pupils bilaterally were 2 mm, slightly reactive.  Speech was fluent.  Smile symmetric.  Motor, bilateral upper 4/5, bilateral lower 4-/5.  The patient, per previous note, does have significantly impaired proprioception, bilateral extremities, and knee jerks were trace, suspect this is from underlying diabetes.                  LABS:  CBC Full  -  ( 03 Jul 2022 05:10 )  WBC Count : 11.79 K/uL  RBC Count : 3.06 M/uL  Hemoglobin : 8.8 g/dL  Hematocrit : 28.3 %  Platelet Count - Automated : 404 K/uL  Mean Cell Volume : 92.5 fl  Mean Cell Hemoglobin : 28.8 pg  Mean Cell Hemoglobin Concentration : 31.1 gm/dL  Auto Neutrophil # : x  Auto Lymphocyte # : x  Auto Monocyte # : x  Auto Eosinophil # : x  Auto Basophil # : x  Auto Neutrophil % : x  Auto Lymphocyte % : x  Auto Monocyte % : x  Auto Eosinophil % : x  Auto Basophil % : x      07-03    138  |  99  |  39<H>  ----------------------------<  134<H>  4.5   |  31  |  4.70<H>    Ca    9.4      03 Jul 2022 05:10  Mg     2.1     07-02      Hemoglobin A1C:       Vitamin B12         RADIOLOGY      ANALYSIS AND PLAN:  This is a 71-year-old with episode of altered mental status.    For episode of altered mental status, suspect this is multifactorial secondary to underlying metabolic encephalopathy along with mild cognitive impairment and subtle dementia becoming more prominent in the hospital setting.  Examination was limited, but I see no clear focality to suggest a new cerebrovascular accident has ensued.  For history of ataxia and falls, most likely multifactorial secondary to age-related changes and neuropathy.  For history of diabetes, strict control of blood sugars.  For history of hypertension, monitor systolic blood pressure.  For hyperlipidemia, statin.  For mild cognitive impairment versus subtle dementia, for now supportive therapy.  no new events   appears more interactive today 7/3    spoke to spouse, Geoffrey, at [phone] 7/1    Greater than 40 minutes of time was spent with the patient, plan of care, reviewing data, with greater than 50% of the visit was spent counseling and/or coordinating care with multidisciplinary healthcare team

## 2022-07-03 NOTE — PROGRESS NOTE ADULT - PROBLEM SELECTOR PLAN 1
S/p Right hallux amputation, (DOS: 6/23/22)  - s/p RLE bypass graft  - Dressing change today with Acticoat and DSD.   - Continue IV abx per ID  - Continue vascular recs  - Continue med management   - OR culture Staph Aureus & E. faecalis  - OR pathology: right 1st met clean margin: Minimal chronic osteomyelitis  - Pt to partial weight bear to the right heel as tolerated with assisted device (pt has a history of falls)  - No further podiatric intervention at this time. Pt is stable from out standpoint    WOUND CARE INSTRUCTIONS  1. Please leave dressing clean, dry and intact until follow-up. Monitor for any signs of infection and present to the ED if they arise.  2. If the dressing gets wet, please change with dry, sterile dressing.  3. Patient to be partial weight bear to the right heel as tolerated with assisted device (pt has a history of falls)  4. Patient is to follow up [facility] with Dr. Lau or Dr. Pastor within 3-5 days upon discharge. Please call [phone] as soon as discharged and state that it is for a "post-op appointment".

## 2022-07-03 NOTE — PROGRESS NOTE ADULT - SUBJECTIVE AND OBJECTIVE BOX
Patient is a 71y old  Female who presents with a chief complaint of SIRS (03 Jul 2022 11:24)      INTERVAL HPI/OVERNIGHT EVENTS: Pt seen and examined at bedside. No overnight events. Pt resting comfortably in bed, NAD, denies fevers, chills, ha, dizziness, cp, sob, abd pain, n/v/d, incontinence, calf pain.     MEDICATIONS  (STANDING):  ampicillin/sulbactam  IVPB 3 Gram(s) IV Intermittent every 24 hours  ampicillin/sulbactam  IVPB      aspirin enteric coated 81 milliGRAM(s) Oral daily  atorvastatin 40 milliGRAM(s) Oral at bedtime  chlorhexidine 4% Liquid 1 Application(s) Topical <User Schedule>  cloNIDine 0.1 milliGRAM(s) Oral two times a day  clopidogrel Tablet 75 milliGRAM(s) Oral daily  dextrose 5%. 1000 milliLiter(s) (50 mL/Hr) IV Continuous <Continuous>  dextrose 50% Injectable 25 Gram(s) IV Push once  epoetin fawad-epbx (RETACRIT) Injectable 71427 Unit(s) IV Push <User Schedule>  glucagon  Injectable 1 milliGRAM(s) IntraMuscular once  heparin   Injectable 5000 Unit(s) SubCutaneous every 12 hours  imipramine 50 milliGRAM(s) Oral daily  insulin glargine Injectable (LANTUS) 8 Unit(s) SubCutaneous at bedtime  insulin lispro (ADMELOG) corrective regimen sliding scale   SubCutaneous three times a day before meals  insulin lispro (ADMELOG) corrective regimen sliding scale   SubCutaneous at bedtime  metoprolol tartrate 100 milliGRAM(s) Oral every 12 hours  pantoprazole    Tablet 40 milliGRAM(s) Oral before breakfast  risperiDONE   Tablet 0.5 milliGRAM(s) Oral two times a day    MEDICATIONS  (PRN):  acetaminophen     Tablet .. 650 milliGRAM(s) Oral every 6 hours PRN Temp greater or equal to 38C (100.4F), Mild Pain (1 - 3)  aluminum hydroxide/magnesium hydroxide/simethicone Suspension 30 milliLiter(s) Oral every 4 hours PRN Dyspepsia  dextrose Oral Gel 15 Gram(s) Oral once PRN Blood Glucose LESS THAN 70 milliGRAM(s)/deciliter  melatonin 3 milliGRAM(s) Oral at bedtime PRN Insomnia  OLANZapine Injectable 2.5 milliGRAM(s) IntraMuscular every 6 hours PRN Acute agitation      Allergies    latex (Unknown)  No Known Drug Allergies    Intolerances        REVIEW OF SYSTEMS:  CONSTITUTIONAL: No fever or chills  HEENT:  No headache, no sore throat  RESPIRATORY: No cough, wheezing, or shortness of breath  CARDIOVASCULAR: No chest pain, palpitations  GASTROINTESTINAL: No abd pain, nausea, vomiting, or diarrhea  GENITOURINARY: No dysuria, frequency, or hematuria  NEUROLOGICAL: no focal weakness or dizziness  MUSCULOSKELETAL: no myalgias     Vital Signs Last 24 Hrs  T(C): 36.8 (03 Jul 2022 12:00), Max: 36.8 (03 Jul 2022 12:00)  T(F): 98.2 (03 Jul 2022 12:00), Max: 98.2 (03 Jul 2022 12:00)  HR: 75 (03 Jul 2022 12:00) (75 - 81)  BP: 157/68 (03 Jul 2022 12:00) (129/63 - 163/79)  BP(mean): --  RR: 18 (03 Jul 2022 12:00) (17 - 18)  SpO2: 93% (03 Jul 2022 12:00) (90% - 97%)    PHYSICAL EXAM:  GENERAL: AOx3, NAD  HEENT:  anicteric, moist mucous membranes  CHEST/LUNG:  CTA b/l, no rales, wheezes, or rhonchi  HEART:  RRR, S1, S2  ABDOMEN:  BS+, soft, nontender, nondistended  EXTREMITIES: no edema, cyanosis, or calf tenderness, right foot dressed s/p surgical amputation of digit  NERVOUS SYSTEM: answers questions and follows commands appropriately    LABS:                        8.8    11.79 )-----------( 404      ( 03 Jul 2022 05:10 )             28.3     CBC Full  -  ( 03 Jul 2022 05:10 )  WBC Count : 11.79 K/uL  Hemoglobin : 8.8 g/dL  Hematocrit : 28.3 %  Platelet Count - Automated : 404 K/uL  Mean Cell Volume : 92.5 fl  Mean Cell Hemoglobin : 28.8 pg  Mean Cell Hemoglobin Concentration : 31.1 gm/dL  Auto Neutrophil # : x  Auto Lymphocyte # : x  Auto Monocyte # : x  Auto Eosinophil # : x  Auto Basophil # : x  Auto Neutrophil % : x  Auto Lymphocyte % : x  Auto Monocyte % : x  Auto Eosinophil % : x  Auto Basophil % : x    03 Jul 2022 05:10    138    |  99     |  39     ----------------------------<  134    4.5     |  31     |  4.70     Ca    9.4        03 Jul 2022 05:10          CAPILLARY BLOOD GLUCOSE      POCT Blood Glucose.: 199 mg/dL (03 Jul 2022 11:35)  POCT Blood Glucose.: 138 mg/dL (03 Jul 2022 08:06)  POCT Blood Glucose.: 161 mg/dL (02 Jul 2022 21:35)  POCT Blood Glucose.: 195 mg/dL (02 Jul 2022 17:28)        Culture - Blood (collected 06-28-22 @ 23:58)  Source: .Blood Blood-Peripheral  Preliminary Report (06-30-22 @ 04:02):    No growth to date.    Culture - Blood (collected 06-28-22 @ 23:53)  Source: .Blood Blood-Peripheral  Preliminary Report (06-30-22 @ 04:02):    No growth to date.    Culture - Blood (collected 06-27-22 @ 11:45)  Source: .Blood Blood-Peripheral  Final Report (07-02-22 @ 17:00):    No Growth Final    Culture - Blood (collected 06-27-22 @ 11:40)  Source: .Blood Blood-Peripheral  Final Report (07-02-22 @ 17:00):    No Growth Final        RADIOLOGY & ADDITIONAL TESTS:    Personally reviewed.     Consultant(s) Notes Reviewed:  [x] YES  [ ] NO

## 2022-07-03 NOTE — PROGRESS NOTE ADULT - ASSESSMENT
Patient is a 71 year old female with PMH of A.fib rate controlled not on AC for hx of multiple falls, T2DM, HTN, HLD, ESRD on HD, CHF, s/p CABG brought in by EMS for generalized weakness at home. Patient states that she was doing well when in the afternoon she tried to get up from her couch and felt weak in the LE and fell back in her couch. Denies any hx of head trauma or loss of consciousness. Denies any weakness or numbness. Denies any chest pain, SOB or palpitations. No fever, cough or chills. No hx of recent travel or sick contacts. At the time of EMS arrival patient was found to have POCBS of 50. EMS gave dextrose and on arrival to the ED patient blood sugar was found to be in 30's with hypothermia of 94.9.    ED course:   Vitals:   BP: 176/85  HR:76  Temp:94.2  RR:18, O2:96% on RA   Significant Labs:   CBC: WBC:16.82 Hb:13.2 , Platlets: 260, Neutro:89   CMP: Lytes: WNL, BUN/Creatinine:48/4.8, Glucose:134 , Alkaline Phos:128, AST, 41, eGFR: 9, HsTrop: 275.9, ProBNP: 223523, Lactate: 2  UA; negative  CXR: Heart and lung appear normal (self read)  CT BRAIN: No acute intracranial bleeding. Central volume loss, chronic microvascular ischemic changes, and chronic bilateral lacunar infarctions.  CT CERVICAL SPINE: No fracture. Grade 1 C4-C5 anterolisthesis. C6-C7 disc degeneration. Bilateral pleural effusions and interlobular septal thickening due to   interstitial edema.  EKG: NSR, left axis deviation, HR 71,   QT/QTc: 426/462  Patient received: Ceftriaxone 1 g x1, Dextrose 5% + NS 1L x1, Dextrose 50% IV X1, heating blanket, 2L NC   (16 Jun 2022 01:19)        esrd on hd plan for hd as order      Excess fluids and waste products will be removed from your blood; your electrolytes will be balanced; your blood pressure will be controlled.    ANEMIA PLAN:  Anemia of chronic disease:  We will continue epo  aiming for a HCT of 32-36 %.   We will monitor Iron stores, B12 and RBC folate .    BP monitoring,continue current antihypertensive meds, low salt diet  metoprolol tartrate 100 milliGRAM(s) Oral every 12 hours  cloNIDine 0.1 milliGRAM(s) Oral two times a day    f/u  blood and urine cx,serial lactate levels,monitor vitals closley,ivfs hydration,monitor urine output and renal profile,iv abx  ampicillin/sulbactam  IVPB 3 Gram(s) IV Intermittent every 24 hours    dvt heparin   Injectable 5000 Unit(s) SubCutaneous every 12 hours

## 2022-07-03 NOTE — PROGRESS NOTE ADULT - SUBJECTIVE AND OBJECTIVE BOX
Patient is a 71y Female whom presented to the hospital with esrd on hd     PAST MEDICAL & SURGICAL HISTORY:  Diabetes mellitus II      HTN (hypertension)      h/o Anxiety attack      Depression      h/o Myocardial infarct 2007      CAD (coronary artery disease)      h/o Hepatitis A 1969  currently resolved, no symptoms      PAD (peripheral artery disease)      Murmur, cardiac      h/o Smoking  quitted 3/2012      CRF (chronic renal failure), unspecified stage      Dialysis patient      Anemia secondary to renal failure      HTN (hypertension)      coronary stent 2007      s/p Ovarian cyst removal      s/p surgical removal of benign Skin lesion epigastric area          MEDICATIONS  (STANDING):  amLODIPine   Tablet 5 milliGRAM(s) Oral daily  aspirin enteric coated 81 milliGRAM(s) Oral daily  atorvastatin 40 milliGRAM(s) Oral at bedtime  calcium acetate 667 milliGRAM(s) Oral three times a day with meals  cefTRIAXone   IVPB 1000 milliGRAM(s) IV Intermittent every 24 hours  cloNIDine 0.1 milliGRAM(s) Oral two times a day  clopidogrel Tablet 75 milliGRAM(s) Oral daily  dextrose 5%. 1000 milliLiter(s) (100 mL/Hr) IV Continuous <Continuous>  dextrose 5%. 1000 milliLiter(s) (50 mL/Hr) IV Continuous <Continuous>  dextrose 50% Injectable 25 Gram(s) IV Push once  dextrose 50% Injectable 12.5 Gram(s) IV Push once  dextrose 50% Injectable 25 Gram(s) IV Push once  diltiazem    Tablet 60 milliGRAM(s) Oral every 8 hours  glucagon  Injectable 1 milliGRAM(s) IntraMuscular once  heparin   Injectable 5000 Unit(s) SubCutaneous every 12 hours  imipramine 50 milliGRAM(s) Oral daily  insulin glargine Injectable (LANTUS) 12 Unit(s) SubCutaneous at bedtime  insulin lispro (ADMELOG) corrective regimen sliding scale   SubCutaneous three times a day before meals  insulin lispro (ADMELOG) corrective regimen sliding scale   SubCutaneous at bedtime  metoprolol tartrate 100 milliGRAM(s) Oral every 12 hours  pantoprazole    Tablet 40 milliGRAM(s) Oral before breakfast  povidone iodine 10% Solution 1 Application(s) Topical daily      Allergies    latex (Unknown)  No Known Drug Allergies    Intolerances        SOCIAL HISTORY:  Denies ETOh,Smoking,     FAMILY HISTORY:  No pertinent family history in first degree relatives        REVIEW OF SYSTEMS:    CONSTITUTIONAL: No weakness, fevers or chills  RESPIRATORY: No cough, wheezing, hemoptysis; No shortness of breath  CARDIOVASCULAR: No chest pain or palpitations                                                  8.8    11.79 )-----------( 404      ( 03 Jul 2022 05:10 )             28.3       CBC Full  -  ( 03 Jul 2022 05:10 )  WBC Count : 11.79 K/uL  RBC Count : 3.06 M/uL  Hemoglobin : 8.8 g/dL  Hematocrit : 28.3 %  Platelet Count - Automated : 404 K/uL  Mean Cell Volume : 92.5 fl  Mean Cell Hemoglobin : 28.8 pg  Mean Cell Hemoglobin Concentration : 31.1 gm/dL  Auto Neutrophil # : x  Auto Lymphocyte # : x  Auto Monocyte # : x  Auto Eosinophil # : x  Auto Basophil # : x  Auto Neutrophil % : x  Auto Lymphocyte % : x  Auto Monocyte % : x  Auto Eosinophil % : x  Auto Basophil % : x      07-03    138  |  99  |  39<H>  ----------------------------<  134<H>  4.5   |  31  |  4.70<H>    Ca    9.4      03 Jul 2022 05:10  Mg     2.1     07-02        CAPILLARY BLOOD GLUCOSE      POCT Blood Glucose.: 199 mg/dL (03 Jul 2022 11:35)  POCT Blood Glucose.: 138 mg/dL (03 Jul 2022 08:06)  POCT Blood Glucose.: 161 mg/dL (02 Jul 2022 21:35)  POCT Blood Glucose.: 195 mg/dL (02 Jul 2022 17:28)      Vital Signs Last 24 Hrs  T(C): 36.8 (03 Jul 2022 12:00), Max: 36.8 (03 Jul 2022 12:00)  T(F): 98.2 (03 Jul 2022 12:00), Max: 98.2 (03 Jul 2022 12:00)  HR: 75 (03 Jul 2022 12:00) (75 - 81)  BP: 157/68 (03 Jul 2022 12:00) (129/63 - 163/79)  BP(mean): --  RR: 18 (03 Jul 2022 12:00) (17 - 18)  SpO2: 93% (03 Jul 2022 12:00) (90% - 97%)                                                                                                                             PHYSICAL EXAM:    Constitutional: NAD  HEENT: conjunctive   clear   Neck:  No JVD  Respiratory: CTAB  Cardiovascular: S1 and S2  Gastrointestinal: BS+, soft, NT/ND  Skin: dry

## 2022-07-04 LAB
ANION GAP SERPL CALC-SCNC: 13 MMOL/L — SIGNIFICANT CHANGE UP (ref 5–17)
BASOPHILS # BLD AUTO: 0.06 K/UL — SIGNIFICANT CHANGE UP (ref 0–0.2)
BASOPHILS NFR BLD AUTO: 0.6 % — SIGNIFICANT CHANGE UP (ref 0–2)
BUN SERPL-MCNC: 52 MG/DL — HIGH (ref 7–23)
CALCIUM SERPL-MCNC: 9.5 MG/DL — SIGNIFICANT CHANGE UP (ref 8.5–10.1)
CHLORIDE SERPL-SCNC: 98 MMOL/L — SIGNIFICANT CHANGE UP (ref 96–108)
CO2 SERPL-SCNC: 25 MMOL/L — SIGNIFICANT CHANGE UP (ref 22–31)
CREAT SERPL-MCNC: 6.1 MG/DL — HIGH (ref 0.5–1.3)
CULTURE RESULTS: SIGNIFICANT CHANGE UP
CULTURE RESULTS: SIGNIFICANT CHANGE UP
EGFR: 7 ML/MIN/1.73M2 — LOW
EOSINOPHIL # BLD AUTO: 0.37 K/UL — SIGNIFICANT CHANGE UP (ref 0–0.5)
EOSINOPHIL NFR BLD AUTO: 3.8 % — SIGNIFICANT CHANGE UP (ref 0–6)
GLUCOSE SERPL-MCNC: 149 MG/DL — HIGH (ref 70–99)
HCT VFR BLD CALC: 27.1 % — LOW (ref 34.5–45)
HGB BLD-MCNC: 8.3 G/DL — LOW (ref 11.5–15.5)
IMM GRANULOCYTES NFR BLD AUTO: 2.2 % — HIGH (ref 0–1.5)
LYMPHOCYTES # BLD AUTO: 0.79 K/UL — LOW (ref 1–3.3)
LYMPHOCYTES # BLD AUTO: 8 % — LOW (ref 13–44)
MAGNESIUM SERPL-MCNC: 2.7 MG/DL — HIGH (ref 1.6–2.6)
MCHC RBC-ENTMCNC: 28 PG — SIGNIFICANT CHANGE UP (ref 27–34)
MCHC RBC-ENTMCNC: 30.6 GM/DL — LOW (ref 32–36)
MCV RBC AUTO: 91.6 FL — SIGNIFICANT CHANGE UP (ref 80–100)
MONOCYTES # BLD AUTO: 0.96 K/UL — HIGH (ref 0–0.9)
MONOCYTES NFR BLD AUTO: 9.8 % — SIGNIFICANT CHANGE UP (ref 2–14)
NEUTROPHILS # BLD AUTO: 7.44 K/UL — HIGH (ref 1.8–7.4)
NEUTROPHILS NFR BLD AUTO: 75.6 % — SIGNIFICANT CHANGE UP (ref 43–77)
NRBC # BLD: 0 /100 WBCS — SIGNIFICANT CHANGE UP (ref 0–0)
PHOSPHATE SERPL-MCNC: 4 MG/DL — SIGNIFICANT CHANGE UP (ref 2.5–4.5)
PLATELET # BLD AUTO: 454 K/UL — HIGH (ref 150–400)
POTASSIUM SERPL-MCNC: 5.4 MMOL/L — HIGH (ref 3.5–5.3)
POTASSIUM SERPL-SCNC: 5.4 MMOL/L — HIGH (ref 3.5–5.3)
RBC # BLD: 2.96 M/UL — LOW (ref 3.8–5.2)
RBC # FLD: 18.4 % — HIGH (ref 10.3–14.5)
SODIUM SERPL-SCNC: 136 MMOL/L — SIGNIFICANT CHANGE UP (ref 135–145)
SPECIMEN SOURCE: SIGNIFICANT CHANGE UP
SPECIMEN SOURCE: SIGNIFICANT CHANGE UP
WBC # BLD: 9.84 K/UL — SIGNIFICANT CHANGE UP (ref 3.8–10.5)
WBC # FLD AUTO: 9.84 K/UL — SIGNIFICANT CHANGE UP (ref 3.8–10.5)

## 2022-07-04 PROCEDURE — 99233 SBSQ HOSP IP/OBS HIGH 50: CPT | Mod: GC

## 2022-07-04 RX ADMIN — OLANZAPINE 2.5 MILLIGRAM(S): 15 TABLET, FILM COATED ORAL at 00:58

## 2022-07-04 RX ADMIN — PANTOPRAZOLE SODIUM 40 MILLIGRAM(S): 20 TABLET, DELAYED RELEASE ORAL at 06:20

## 2022-07-04 RX ADMIN — Medication 100 MILLIGRAM(S): at 06:20

## 2022-07-04 RX ADMIN — Medication 0.1 MILLIGRAM(S): at 17:54

## 2022-07-04 RX ADMIN — RISPERIDONE 0.5 MILLIGRAM(S): 4 TABLET ORAL at 17:54

## 2022-07-04 RX ADMIN — ERYTHROPOIETIN 10000 UNIT(S): 10000 INJECTION, SOLUTION INTRAVENOUS; SUBCUTANEOUS at 14:32

## 2022-07-04 RX ADMIN — HEPARIN SODIUM 5000 UNIT(S): 5000 INJECTION INTRAVENOUS; SUBCUTANEOUS at 06:20

## 2022-07-04 RX ADMIN — HEPARIN SODIUM 5000 UNIT(S): 5000 INJECTION INTRAVENOUS; SUBCUTANEOUS at 17:53

## 2022-07-04 RX ADMIN — Medication 100 MILLIGRAM(S): at 17:54

## 2022-07-04 RX ADMIN — ATORVASTATIN CALCIUM 40 MILLIGRAM(S): 80 TABLET, FILM COATED ORAL at 21:24

## 2022-07-04 RX ADMIN — Medication 0.1 MILLIGRAM(S): at 06:20

## 2022-07-04 RX ADMIN — CHLORHEXIDINE GLUCONATE 1 APPLICATION(S): 213 SOLUTION TOPICAL at 06:19

## 2022-07-04 RX ADMIN — AMPICILLIN SODIUM AND SULBACTAM SODIUM 200 GRAM(S): 250; 125 INJECTION, POWDER, FOR SUSPENSION INTRAMUSCULAR; INTRAVENOUS at 21:23

## 2022-07-04 RX ADMIN — INSULIN GLARGINE 8 UNIT(S): 100 INJECTION, SOLUTION SUBCUTANEOUS at 21:23

## 2022-07-04 RX ADMIN — RISPERIDONE 0.5 MILLIGRAM(S): 4 TABLET ORAL at 06:20

## 2022-07-04 NOTE — PROGRESS NOTE ADULT - SUBJECTIVE AND OBJECTIVE BOX
Patient is a 71y Female whom presented to the hospital with esrd on hd     PAST MEDICAL & SURGICAL HISTORY:  Diabetes mellitus II      HTN (hypertension)      h/o Anxiety attack      Depression      h/o Myocardial infarct 2007      CAD (coronary artery disease)      h/o Hepatitis A 1969  currently resolved, no symptoms      PAD (peripheral artery disease)      Murmur, cardiac      h/o Smoking  quitted 3/2012      CRF (chronic renal failure), unspecified stage      Dialysis patient      Anemia secondary to renal failure      HTN (hypertension)      coronary stent 2007      s/p Ovarian cyst removal      s/p surgical removal of benign Skin lesion epigastric area          MEDICATIONS  (STANDING):  amLODIPine   Tablet 5 milliGRAM(s) Oral daily  aspirin enteric coated 81 milliGRAM(s) Oral daily  atorvastatin 40 milliGRAM(s) Oral at bedtime  calcium acetate 667 milliGRAM(s) Oral three times a day with meals  cefTRIAXone   IVPB 1000 milliGRAM(s) IV Intermittent every 24 hours  cloNIDine 0.1 milliGRAM(s) Oral two times a day  clopidogrel Tablet 75 milliGRAM(s) Oral daily  dextrose 5%. 1000 milliLiter(s) (100 mL/Hr) IV Continuous <Continuous>  dextrose 5%. 1000 milliLiter(s) (50 mL/Hr) IV Continuous <Continuous>  dextrose 50% Injectable 25 Gram(s) IV Push once  dextrose 50% Injectable 12.5 Gram(s) IV Push once  dextrose 50% Injectable 25 Gram(s) IV Push once  diltiazem    Tablet 60 milliGRAM(s) Oral every 8 hours  glucagon  Injectable 1 milliGRAM(s) IntraMuscular once  heparin   Injectable 5000 Unit(s) SubCutaneous every 12 hours  imipramine 50 milliGRAM(s) Oral daily  insulin glargine Injectable (LANTUS) 12 Unit(s) SubCutaneous at bedtime  insulin lispro (ADMELOG) corrective regimen sliding scale   SubCutaneous three times a day before meals  insulin lispro (ADMELOG) corrective regimen sliding scale   SubCutaneous at bedtime  metoprolol tartrate 100 milliGRAM(s) Oral every 12 hours  pantoprazole    Tablet 40 milliGRAM(s) Oral before breakfast  povidone iodine 10% Solution 1 Application(s) Topical daily      Allergies    latex (Unknown)  No Known Drug Allergies    Intolerances        SOCIAL HISTORY:  Denies ETOh,Smoking,     FAMILY HISTORY:  No pertinent family history in first degree relatives        REVIEW OF SYSTEMS:    CONSTITUTIONAL: No weakness, fevers or chills  RESPIRATORY: No cough, wheezing, hemoptysis; No shortness of breath  CARDIOVASCULAR: No chest pain or palpitations                                                                       8.3    9.84  )-----------( 454      ( 04 Jul 2022 12:05 )             27.1       CBC Full  -  ( 04 Jul 2022 12:05 )  WBC Count : 9.84 K/uL  RBC Count : 2.96 M/uL  Hemoglobin : 8.3 g/dL  Hematocrit : 27.1 %  Platelet Count - Automated : 454 K/uL  Mean Cell Volume : 91.6 fl  Mean Cell Hemoglobin : 28.0 pg  Mean Cell Hemoglobin Concentration : 30.6 gm/dL  Auto Neutrophil # : 7.44 K/uL  Auto Lymphocyte # : 0.79 K/uL  Auto Monocyte # : 0.96 K/uL  Auto Eosinophil # : 0.37 K/uL  Auto Basophil # : 0.06 K/uL  Auto Neutrophil % : 75.6 %  Auto Lymphocyte % : 8.0 %  Auto Monocyte % : 9.8 %  Auto Eosinophil % : 3.8 %  Auto Basophil % : 0.6 %      07-04    136  |  98  |  52<H>  ----------------------------<  149<H>  5.4<H>   |  25  |  6.10<H>    Ca    9.5      04 Jul 2022 12:05  Phos  4.0     07-04  Mg     2.7     07-04        CAPILLARY BLOOD GLUCOSE      POCT Blood Glucose.: 112 mg/dL (04 Jul 2022 17:07)  POCT Blood Glucose.: 104 mg/dL (04 Jul 2022 13:44)  POCT Blood Glucose.: 137 mg/dL (04 Jul 2022 08:10)  POCT Blood Glucose.: 164 mg/dL (03 Jul 2022 21:20)      Vital Signs Last 24 Hrs  T(C): 36.3 (04 Jul 2022 15:10), Max: 36.7 (03 Jul 2022 19:40)  T(F): 97.4 (04 Jul 2022 15:10), Max: 98.1 (03 Jul 2022 19:40)  HR: 84 (04 Jul 2022 17:49) (71 - 95)  BP: 166/76 (04 Jul 2022 17:49) (122/74 - 190/80)  BP(mean): --  RR: 18 (04 Jul 2022 17:49) (18 - 18)  SpO2: 95% (04 Jul 2022 17:49) (90% - 98%)                                                                                                                             PHYSICAL EXAM:    Constitutional: NAD  HEENT: conjunctive   clear   Neck:  No JVD  Respiratory: CTAB  Cardiovascular: S1 and S2  Gastrointestinal: BS+, soft, NT/ND  Skin: dry

## 2022-07-04 NOTE — PROGRESS NOTE ADULT - SUBJECTIVE AND OBJECTIVE BOX
Patient is a 71y old  Female who presents with a chief complaint of SIRS (04 Jul 2022 08:04)       INTERVAL HPI/OVERNIGHT EVENTS: Patient seen and examined at bedside. No new events, complaints     MEDICATIONS  (STANDING):  ampicillin/sulbactam  IVPB 3 Gram(s) IV Intermittent every 24 hours  ampicillin/sulbactam  IVPB      aspirin enteric coated 81 milliGRAM(s) Oral daily  atorvastatin 40 milliGRAM(s) Oral at bedtime  chlorhexidine 4% Liquid 1 Application(s) Topical <User Schedule>  cloNIDine 0.1 milliGRAM(s) Oral two times a day  clopidogrel Tablet 75 milliGRAM(s) Oral daily  dextrose 5%. 1000 milliLiter(s) (50 mL/Hr) IV Continuous <Continuous>  dextrose 50% Injectable 25 Gram(s) IV Push once  epoetin fawad-epbx (RETACRIT) Injectable 96588 Unit(s) IV Push <User Schedule>  glucagon  Injectable 1 milliGRAM(s) IntraMuscular once  heparin   Injectable 5000 Unit(s) SubCutaneous every 12 hours  imipramine 50 milliGRAM(s) Oral daily  insulin glargine Injectable (LANTUS) 8 Unit(s) SubCutaneous at bedtime  insulin lispro (ADMELOG) corrective regimen sliding scale   SubCutaneous three times a day before meals  insulin lispro (ADMELOG) corrective regimen sliding scale   SubCutaneous at bedtime  metoprolol tartrate 100 milliGRAM(s) Oral every 12 hours  pantoprazole    Tablet 40 milliGRAM(s) Oral before breakfast  risperiDONE   Tablet 0.5 milliGRAM(s) Oral two times a day    MEDICATIONS  (PRN):  acetaminophen     Tablet .. 650 milliGRAM(s) Oral every 6 hours PRN Temp greater or equal to 38C (100.4F), Mild Pain (1 - 3)  aluminum hydroxide/magnesium hydroxide/simethicone Suspension 30 milliLiter(s) Oral every 4 hours PRN Dyspepsia  dextrose Oral Gel 15 Gram(s) Oral once PRN Blood Glucose LESS THAN 70 milliGRAM(s)/deciliter  melatonin 3 milliGRAM(s) Oral at bedtime PRN Insomnia  OLANZapine Injectable 2.5 milliGRAM(s) IntraMuscular every 6 hours PRN Acute agitation      Allergies    latex (Unknown)  No Known Drug Allergies    Intolerances        REVIEW OF SYSTEMS:  All 10 systems reviewed, patient says no to all but is confused at times per RN   Vital Signs Last 24 Hrs  T(C): 36.7 (04 Jul 2022 04:33), Max: 36.8 (03 Jul 2022 12:00)  T(F): 98.1 (04 Jul 2022 04:33), Max: 98.2 (03 Jul 2022 12:00)  HR: 92 (04 Jul 2022 04:33) (71 - 92)  BP: 146/71 (04 Jul 2022 04:33) (144/67 - 157/68)  BP(mean): --  RR: 18 (04 Jul 2022 04:33) (18 - 18)  SpO2: 92% (04 Jul 2022 04:33) (90% - 93%)    PHYSICAL EXAM:  GENERAL: AOx3, NAD  HEENT:  anicteric, moist mucous membranes  CHEST/LUNG:  CTA b/l, no rales, wheezes, or rhonchi  HEART:  RRR, S1, S2  ABDOMEN:  BS+, soft, nontender, nondistended  EXTREMITIES: no edema, cyanosis, or calf tenderness, right foot dressed s/p surgical amputation of digit  NERVOUS SYSTEM: answers questions and follows commands appropriately  LABS:      Ca    9.4        03 Jul 2022 05:10        CAPILLARY BLOOD GLUCOSE      POCT Blood Glucose.: 137 mg/dL (04 Jul 2022 08:10)  POCT Blood Glucose.: 164 mg/dL (03 Jul 2022 21:20)  POCT Blood Glucose.: 170 mg/dL (03 Jul 2022 17:02)  POCT Blood Glucose.: 199 mg/dL (03 Jul 2022 11:35)    BLOOD CULTURE    RADIOLOGY & ADDITIONAL TESTS:    Imaging Personally Reviewed:  [ ] YES     Consultant(s) Notes Reviewed:      Care Discussed with Consultants/Other Providers:

## 2022-07-04 NOTE — PROGRESS NOTE ADULT - SUBJECTIVE AND OBJECTIVE BOX
Neurology follow up note    SHILO [id number]      Interval History:    Patient feels ok no new complaints.    Allergies    latex (Unknown)  No Known Drug Allergies    Intolerances        MEDICATIONS    acetaminophen     Tablet .. 650 milliGRAM(s) Oral every 6 hours PRN  aluminum hydroxide/magnesium hydroxide/simethicone Suspension 30 milliLiter(s) Oral every 4 hours PRN  ampicillin/sulbactam  IVPB 3 Gram(s) IV Intermittent every 24 hours  ampicillin/sulbactam  IVPB      aspirin enteric coated 81 milliGRAM(s) Oral daily  atorvastatin 40 milliGRAM(s) Oral at bedtime  chlorhexidine 4% Liquid 1 Application(s) Topical <User Schedule>  cloNIDine 0.1 milliGRAM(s) Oral two times a day  clopidogrel Tablet 75 milliGRAM(s) Oral daily  dextrose 5%. 1000 milliLiter(s) IV Continuous <Continuous>  dextrose 50% Injectable 25 Gram(s) IV Push once  dextrose Oral Gel 15 Gram(s) Oral once PRN  epoetin fawad-epbx (RETACRIT) Injectable 75676 Unit(s) IV Push <User Schedule>  glucagon  Injectable 1 milliGRAM(s) IntraMuscular once  heparin   Injectable 5000 Unit(s) SubCutaneous every 12 hours  imipramine 50 milliGRAM(s) Oral daily  insulin glargine Injectable (LANTUS) 8 Unit(s) SubCutaneous at bedtime  insulin lispro (ADMELOG) corrective regimen sliding scale   SubCutaneous at bedtime  insulin lispro (ADMELOG) corrective regimen sliding scale   SubCutaneous three times a day before meals  melatonin 3 milliGRAM(s) Oral at bedtime PRN  metoprolol tartrate 100 milliGRAM(s) Oral every 12 hours  OLANZapine Injectable 2.5 milliGRAM(s) IntraMuscular every 6 hours PRN  pantoprazole    Tablet 40 milliGRAM(s) Oral before breakfast  risperiDONE   Tablet 0.5 milliGRAM(s) Oral two times a day              Vital Signs Last 24 Hrs  T(C): 36.7 (04 Jul 2022 04:33), Max: 36.8 (03 Jul 2022 12:00)  T(F): 98.1 (04 Jul 2022 04:33), Max: 98.2 (03 Jul 2022 12:00)  HR: 92 (04 Jul 2022 04:33) (71 - 92)  BP: 146/71 (04 Jul 2022 04:33) (144/67 - 157/68)  BP(mean): --  RR: 18 (04 Jul 2022 04:33) (18 - 18)  SpO2: 92% (04 Jul 2022 04:33) (90% - 93%)    REVIEW OF SYSTEMS:  Slightly limited secondary to the patient being altered, but constitutionally, she denies fever, chills, or night sweats.  Head:  No headaches.  Eyes:  No double vision or blurry vision.  Ears:  No ringing in the ears.  Neck:  No neck pain.  Cardiovascular:  No chest pain.  Respiratory:  No shortness of breath.  Abdomen:  No nausea, vomiting, or abdominal pain.  Extremities/Neurological:  No numbness or tingling.  Musculoskeletal:  No joint pain.      PHYSICAL EXAMINATION:   HEENT:  Head:  Normocephalic, atraumatic.  Eyes:  No scleral icterus.  Ears:  Hearing appeared to be intact.  NECK:  Supple.  CARDIOVASCULAR:  S1 and S2 are heard.  RESPIRATORY:  Decreased breath sounds bilaterally, gives poor effort.  ABDOMEN:  Soft, nontender.  EXTREMITIES:  No clubbing or cyanosis was noted.      NEUROLOGIC:  The patient was arousable to verbal stimuli.  Location unknown, year was 2004, was able to name the number of fingers in each eye.  Pupils bilaterally were 2 mm, slightly reactive.  Speech was fluent.  Smile symmetric.  Motor, bilateral upper 4/5, bilateral lower 4-/5.  The patient, per previous note, does have significantly impaired proprioception, bilateral extremities, and knee jerks were trace, suspect this is from underlying diabetes.                    LABS:  CBC Full  -  ( 03 Jul 2022 05:10 )  WBC Count : 11.79 K/uL  RBC Count : 3.06 M/uL  Hemoglobin : 8.8 g/dL  Hematocrit : 28.3 %  Platelet Count - Automated : 404 K/uL  Mean Cell Volume : 92.5 fl  Mean Cell Hemoglobin : 28.8 pg  Mean Cell Hemoglobin Concentration : 31.1 gm/dL  Auto Neutrophil # : x  Auto Lymphocyte # : x  Auto Monocyte # : x  Auto Eosinophil # : x  Auto Basophil # : x  Auto Neutrophil % : x  Auto Lymphocyte % : x  Auto Monocyte % : x  Auto Eosinophil % : x  Auto Basophil % : x      07-03    138  |  99  |  39<H>  ----------------------------<  134<H>  4.5   |  31  |  4.70<H>    Ca    9.4      03 Jul 2022 05:10      Hemoglobin A1C:       Vitamin B12         RADIOLOGY      ANALYSIS AND PLAN:  This is a 71-year-old with episode of altered mental status.    For episode of altered mental status, suspect this is multifactorial secondary to underlying metabolic encephalopathy along with mild cognitive impairment and subtle dementia becoming more prominent in the hospital setting.  Examination was limited, but I see no clear focality to suggest a new cerebrovascular accident has ensued.  For history of ataxia and falls, most likely multifactorial secondary to age-related changes and neuropathy.  For history of diabetes, strict control of blood sugars.  For history of hypertension, monitor systolic blood pressure.  For hyperlipidemia, statin.  For mild cognitive impairment versus subtle dementia, for now supportive therapy.  no new events   appears more interactive today 7/3    spoke to spouse, Geoffrey, at [phone] 7/1    Greater than 40 minutes of time was spent with the patient, plan of care, reviewing data, with greater than 50% of the visit was spent counseling and/or coordinating care with multidisciplinary healthcare team     Neurology follow up note    SHILO [id number]      Interval History:    Patient feels ok no new complaints.    Allergies    latex (Unknown)  No Known Drug Allergies    Intolerances        MEDICATIONS    acetaminophen     Tablet .. 650 milliGRAM(s) Oral every 6 hours PRN  aluminum hydroxide/magnesium hydroxide/simethicone Suspension 30 milliLiter(s) Oral every 4 hours PRN  ampicillin/sulbactam  IVPB 3 Gram(s) IV Intermittent every 24 hours  ampicillin/sulbactam  IVPB      aspirin enteric coated 81 milliGRAM(s) Oral daily  atorvastatin 40 milliGRAM(s) Oral at bedtime  chlorhexidine 4% Liquid 1 Application(s) Topical <User Schedule>  cloNIDine 0.1 milliGRAM(s) Oral two times a day  clopidogrel Tablet 75 milliGRAM(s) Oral daily  dextrose 5%. 1000 milliLiter(s) IV Continuous <Continuous>  dextrose 50% Injectable 25 Gram(s) IV Push once  dextrose Oral Gel 15 Gram(s) Oral once PRN  epoetin fawad-epbx (RETACRIT) Injectable 20211 Unit(s) IV Push <User Schedule>  glucagon  Injectable 1 milliGRAM(s) IntraMuscular once  heparin   Injectable 5000 Unit(s) SubCutaneous every 12 hours  imipramine 50 milliGRAM(s) Oral daily  insulin glargine Injectable (LANTUS) 8 Unit(s) SubCutaneous at bedtime  insulin lispro (ADMELOG) corrective regimen sliding scale   SubCutaneous at bedtime  insulin lispro (ADMELOG) corrective regimen sliding scale   SubCutaneous three times a day before meals  melatonin 3 milliGRAM(s) Oral at bedtime PRN  metoprolol tartrate 100 milliGRAM(s) Oral every 12 hours  OLANZapine Injectable 2.5 milliGRAM(s) IntraMuscular every 6 hours PRN  pantoprazole    Tablet 40 milliGRAM(s) Oral before breakfast  risperiDONE   Tablet 0.5 milliGRAM(s) Oral two times a day              Vital Signs Last 24 Hrs  T(C): 36.7 (04 Jul 2022 04:33), Max: 36.8 (03 Jul 2022 12:00)  T(F): 98.1 (04 Jul 2022 04:33), Max: 98.2 (03 Jul 2022 12:00)  HR: 92 (04 Jul 2022 04:33) (71 - 92)  BP: 146/71 (04 Jul 2022 04:33) (144/67 - 157/68)  BP(mean): --  RR: 18 (04 Jul 2022 04:33) (18 - 18)  SpO2: 92% (04 Jul 2022 04:33) (90% - 93%)    REVIEW OF SYSTEMS:  Slightly limited secondary to the patient being altered, but constitutionally, she denies fever, chills, or night sweats.  Head:  No headaches.  Eyes:  No double vision or blurry vision.  Ears:  No ringing in the ears.  Neck:  No neck pain.  Cardiovascular:  No chest pain.  Respiratory:  No shortness of breath.  Abdomen:  No nausea, vomiting, or abdominal pain.  Extremities/Neurological:  No numbness or tingling.  Musculoskeletal:  No joint pain.      PHYSICAL EXAMINATION:   HEENT:  Head:  Normocephalic, atraumatic.  Eyes:  No scleral icterus.  Ears:  Hearing appeared to be intact.  NECK:  Supple.  CARDIOVASCULAR:  S1 and S2 are heard.  RESPIRATORY:  Decreased breath sounds bilaterally, gives poor effort.  ABDOMEN:  Soft, nontender.  EXTREMITIES:  No clubbing or cyanosis was noted.      NEUROLOGIC:  The patient was arousable to verbal stimuli.  Location unknown, year was 2004, was able to name the number of fingers in each eye.  Pupils bilaterally were 2 mm, slightly reactive.  Speech was fluent.  Smile symmetric.  Motor, bilateral upper 4/5, bilateral lower 4-/5.  The patient, per previous note, does have significantly impaired proprioception, bilateral extremities, and knee jerks were trace, suspect this is from underlying diabetes.                    LABS:  CBC Full  -  ( 03 Jul 2022 05:10 )  WBC Count : 11.79 K/uL  RBC Count : 3.06 M/uL  Hemoglobin : 8.8 g/dL  Hematocrit : 28.3 %  Platelet Count - Automated : 404 K/uL  Mean Cell Volume : 92.5 fl  Mean Cell Hemoglobin : 28.8 pg  Mean Cell Hemoglobin Concentration : 31.1 gm/dL  Auto Neutrophil # : x  Auto Lymphocyte # : x  Auto Monocyte # : x  Auto Eosinophil # : x  Auto Basophil # : x  Auto Neutrophil % : x  Auto Lymphocyte % : x  Auto Monocyte % : x  Auto Eosinophil % : x  Auto Basophil % : x      07-03    138  |  99  |  39<H>  ----------------------------<  134<H>  4.5   |  31  |  4.70<H>    Ca    9.4      03 Jul 2022 05:10      Hemoglobin A1C:       Vitamin B12         RADIOLOGY      ANALYSIS AND PLAN:  This is a 71-year-old with episode of altered mental status.    For episode of altered mental status, suspect this is multifactorial secondary to underlying metabolic encephalopathy along with mild cognitive impairment and subtle dementia becoming more prominent in the hospital setting.  Examination was limited, but I see no clear focality to suggest a new cerebrovascular accident has ensued.  For history of ataxia and falls, most likely multifactorial secondary to age-related changes and neuropathy.  For history of diabetes, strict control of blood sugars.  For history of hypertension, monitor systolic blood pressure.  For hyperlipidemia, statin.  For mild cognitive impairment versus subtle dementia, for now supportive therapy will have episodes of on and off AMS  no new events     spoke to spouse, Geoffrey, at [phone] 7/1    Greater than 40 minutes of time was spent with the patient, plan of care, reviewing data, with greater than 50% of the visit was spent counseling and/or coordinating care with multidisciplinary healthcare team

## 2022-07-04 NOTE — PROGRESS NOTE ADULT - SUBJECTIVE AND OBJECTIVE BOX
Patient is a 71y Female with a known history of :  SIRS (systemic inflammatory response syndrome) [R65.10]    Hypoglycemia [E16.2]    Pleural effusion [J90]    CHF, acute [I50.9]    Chronic CHF [I50.9]    Elevated troponin [R77.8]    Atrial fibrillation [I48.91]    Benign essential HTN [I10]    HLD (hyperlipidemia) [E78.5]    Depression, major [F32.9]    Need for prophylactic measure [Z29.9]    ESRD on hemodialysis [N18.6]    T2DM (type 2 diabetes mellitus) [E11.9]    HFrEF (heart failure with reduced ejection fraction) [I50.20]    Gangrene of toe of right foot [I96]    Atherosclerotic PVD with ulceration [I70.209]    PVD (peripheral vascular disease) [I73.9]      HPI:  Patient is a 71 year old female with PMH of A.fib rate controlled not on AC for hx of multiple falls, T2DM, HTN, HLD, ESRD on HD, CHF, s/p CABG brought in by EMS for generalized weakness at home. Patient states that she was doing well when in the afternoon she tried to get up from her couch and felt weak in the LE and fell back in her couch. Denies any hx of head trauma or loss of consciousness. Denies any weakness or numbness. Denies any chest pain, SOB or palpitations. No fever, cough or chills. No hx of recent travel or sick contacts. At the time of EMS arrival patient was found to have POCBS of 50. EMS gave dextrose and on arrival to the ED patient blood sugar was found to be in 30's with hypothermia of 94.9.    ED course:   Vitals:   BP: 176/85  HR:76  Temp:94.2  RR:18, O2:96% on RA   Significant Labs:   CBC: WBC:16.82 Hb:13.2 , Platlets: 260, Neutro:89   CMP: Lytes: WNL, BUN/Creatinine:48/4.8, Glucose:134 , Alkaline Phos:128, AST, 41, eGFR: 9, HsTrop: 275.9, ProBNP: 749797, Lactate: 2  UA; negative  CXR: Heart and lung appear normal (self read)  CT BRAIN: No acute intracranial bleeding. Central volume loss, chronic microvascular ischemic changes, and chronic bilateral lacunar infarctions.  CT CERVICAL SPINE: No fracture. Grade 1 C4-C5 anterolisthesis. C6-C7 disc degeneration. Bilateral pleural effusions and interlobular septal thickening due to   interstitial edema.  EKG: NSR, left axis deviation, HR 71,   QT/QTc: 426/462  Patient received: Ceftriaxone 1 g x1, Dextrose 5% + NS 1L x1, Dextrose 50% IV X1, heating blanket, 2L NC   (16 Jun 2022 01:19)      REVIEW OF SYSTEMS:    CONSTITUTIONAL: No fever, weight loss, or fatigue  EYES: No eye pain, visual disturbances, or discharge  ENMT:  No difficulty hearing, tinnitus, vertigo; No sinus or throat pain  NECK: No pain or stiffness  BREASTS: No pain, masses, or nipple discharge  RESPIRATORY: No cough, wheezing, chills or hemoptysis; No shortness of breath  CARDIOVASCULAR: No chest pain, palpitations, dizziness, or leg swelling  GASTROINTESTINAL: No abdominal or epigastric pain. No nausea, vomiting, or hematemesis; No diarrhea or constipation. No melena or hematochezia.  GENITOURINARY: No dysuria, frequency, hematuria, or incontinence  NEUROLOGICAL: No headaches, memory loss, loss of strength, numbness, or tremors  SKIN: No itching, burning, rashes, or lesions   LYMPH NODES: No enlarged glands  ENDOCRINE: No heat or cold intolerance; No hair loss  MUSCULOSKELETAL: No joint pain or swelling; No muscle, back, or extremity pain  PSYCHIATRIC: No depression, anxiety, mood swings, or difficulty sleeping  HEME/LYMPH: No easy bruising, or bleeding gums  ALLERGY AND IMMUNOLOGIC: No hives or eczema    MEDICATIONS  (STANDING):  ampicillin/sulbactam  IVPB 3 Gram(s) IV Intermittent every 24 hours  ampicillin/sulbactam  IVPB      aspirin enteric coated 81 milliGRAM(s) Oral daily  atorvastatin 40 milliGRAM(s) Oral at bedtime  chlorhexidine 4% Liquid 1 Application(s) Topical <User Schedule>  cloNIDine 0.1 milliGRAM(s) Oral two times a day  clopidogrel Tablet 75 milliGRAM(s) Oral daily  dextrose 5%. 1000 milliLiter(s) (50 mL/Hr) IV Continuous <Continuous>  dextrose 50% Injectable 25 Gram(s) IV Push once  epoetin fawad-epbx (RETACRIT) Injectable 14106 Unit(s) IV Push <User Schedule>  glucagon  Injectable 1 milliGRAM(s) IntraMuscular once  heparin   Injectable 5000 Unit(s) SubCutaneous every 12 hours  imipramine 50 milliGRAM(s) Oral daily  insulin glargine Injectable (LANTUS) 8 Unit(s) SubCutaneous at bedtime  insulin lispro (ADMELOG) corrective regimen sliding scale   SubCutaneous three times a day before meals  insulin lispro (ADMELOG) corrective regimen sliding scale   SubCutaneous at bedtime  metoprolol tartrate 100 milliGRAM(s) Oral every 12 hours  pantoprazole    Tablet 40 milliGRAM(s) Oral before breakfast  risperiDONE   Tablet 0.5 milliGRAM(s) Oral two times a day    MEDICATIONS  (PRN):  acetaminophen     Tablet .. 650 milliGRAM(s) Oral every 6 hours PRN Temp greater or equal to 38C (100.4F), Mild Pain (1 - 3)  aluminum hydroxide/magnesium hydroxide/simethicone Suspension 30 milliLiter(s) Oral every 4 hours PRN Dyspepsia  dextrose Oral Gel 15 Gram(s) Oral once PRN Blood Glucose LESS THAN 70 milliGRAM(s)/deciliter  melatonin 3 milliGRAM(s) Oral at bedtime PRN Insomnia  OLANZapine Injectable 2.5 milliGRAM(s) IntraMuscular every 6 hours PRN Acute agitation      ALLERGIES: latex (Unknown)  No Known Drug Allergies      FAMILY HISTORY:  No pertinent family history in first degree relatives        PHYSICAL EXAMINATION:  -----------------------------  T(C): 36.7 (07-04-22 @ 04:33), Max: 36.8 (07-03-22 @ 12:00)  HR: 92 (07-04-22 @ 04:33) (71 - 92)  BP: 146/71 (07-04-22 @ 04:33) (144/67 - 157/68)  RR: 18 (07-04-22 @ 04:33) (18 - 18)  SpO2: 92% (07-04-22 @ 04:33) (90% - 93%)  Wt(kg): --        VITALS  T(C): 36.7 (07-04-22 @ 04:33), Max: 36.8 (07-03-22 @ 12:00)  HR: 92 (07-04-22 @ 04:33) (71 - 92)  BP: 146/71 (07-04-22 @ 04:33) (144/67 - 157/68)  RR: 18 (07-04-22 @ 04:33) (18 - 18)  SpO2: 92% (07-04-22 @ 04:33) (90% - 93%)    Constitutional: well developed, normal appearance, well groomed, well nourished, no deformities and no acute distress.   Eyes: the conjunctiva exhibited no abnormalities and the eyelids demonstrated no xanthelasmas.   HEENT: normal oral mucosa, no oral pallor and no oral cyanosis.   Neck: normal jugular venous A waves present, normal jugular venous V waves present and no jugular venous wilson A waves.   Pulmonary: no respiratory distress, normal respiratory rhythm and effort, no accessory muscle use and lungs were clear to auscultation bilaterally.   Cardiovascular: heart rate and rhythm were normal, normal S1 and S2 and no murmur, gallop, rub, heave or thrill are present.   Abdomen: soft, non-tender, no hepato-splenomegaly and no abdominal mass palpated.   Musculoskeletal: the gait could not be assessed..   Extremities: no clubbing of the fingernails, no localized cyanosis, no petechial hemorrhages and no ischemic changes.   Skin: normal skin color and pigmentation, no rash, no venous stasis, no skin lesions, no skin ulcer and no xanthoma was observed.   Psychiatric: oriented to person, place, and time, the affect was normal, the mood was normal and not feeling anxious.     LABS:   --------  07-03    138  |  99  |  39<H>  ----------------------------<  134<H>  4.5   |  31  |  4.70<H>    Ca    9.4      03 Jul 2022 05:10                           8.8    11.79 )-----------( 404      ( 03 Jul 2022 05:10 )             28.3                 RADIOLOGY:  -----------------    ECG:     ECHO:

## 2022-07-04 NOTE — CHART NOTE - NSCHARTNOTEFT_GEN_A_CORE
Assessment: patient seen for malnutrition follow up   71y old  Female who presents with a chief complaint of SIRS   patient on dialysis at time of visit. CNA reports patient ate ~50% of breakfast meal   7/4 BM       Factors impacting intake: [ ] none [ ] nausea  [ ] vomiting [ ] diarrhea [ ] constipation  [ ]chewing problems [x ] swallowing issues  [ ] other: speech recommends soft and bite sized mildly thick     Diet Prescription: soft and bite sized mild thick renal dash tlc cc diet  Intake: up to 50% of meal taken    Current Weight: 6/28 wt 119# 7/3 wt 116.1# HD patient       Pertinent Medications: MEDICATIONS  (STANDING):  ampicillin/sulbactam  IVPB 3 Gram(s) IV Intermittent every 24 hours  ampicillin/sulbactam  IVPB      aspirin enteric coated 81 milliGRAM(s) Oral daily  atorvastatin 40 milliGRAM(s) Oral at bedtime  chlorhexidine 4% Liquid 1 Application(s) Topical <User Schedule>  cloNIDine 0.1 milliGRAM(s) Oral two times a day  clopidogrel Tablet 75 milliGRAM(s) Oral daily  dextrose 5%. 1000 milliLiter(s) (50 mL/Hr) IV Continuous <Continuous>  dextrose 50% Injectable 25 Gram(s) IV Push once  epoetin fawad-epbx (RETACRIT) Injectable 77034 Unit(s) IV Push <User Schedule>  glucagon  Injectable 1 milliGRAM(s) IntraMuscular once  heparin   Injectable 5000 Unit(s) SubCutaneous every 12 hours  imipramine 50 milliGRAM(s) Oral daily  insulin glargine Injectable (LANTUS) 8 Unit(s) SubCutaneous at bedtime  insulin lispro (ADMELOG) corrective regimen sliding scale   SubCutaneous three times a day before meals  insulin lispro (ADMELOG) corrective regimen sliding scale   SubCutaneous at bedtime  metoprolol tartrate 100 milliGRAM(s) Oral every 12 hours  pantoprazole    Tablet 40 milliGRAM(s) Oral before breakfast  risperiDONE   Tablet 0.5 milliGRAM(s) Oral two times a day    MEDICATIONS  (PRN):  acetaminophen     Tablet .. 650 milliGRAM(s) Oral every 6 hours PRN Temp greater or equal to 38C (100.4F), Mild Pain (1 - 3)  aluminum hydroxide/magnesium hydroxide/simethicone Suspension 30 milliLiter(s) Oral every 4 hours PRN Dyspepsia  dextrose Oral Gel 15 Gram(s) Oral once PRN Blood Glucose LESS THAN 70 milliGRAM(s)/deciliter  melatonin 3 milliGRAM(s) Oral at bedtime PRN Insomnia  OLANZapine Injectable 2.5 milliGRAM(s) IntraMuscular every 6 hours PRN Acute agitation    Pertinent Labs: BUN 39 Creat 4.7 POCT 137, 164,  Skin: left malleolus and coccyx sacrum stage 2    Estimated Needs:   [x ] no change since previous assessment  [ ] recalculated:     Previous Nutrition Diagnosis:   [ ] Inadequate Energy Intake [ ]Inadequate Oral Intake [ ] Excessive Energy Intake   [ ] Underweight [ ] Increased Nutrient Needs [ ] Overweight/Obesity   [ ] Altered GI Function [ ] Unintended Weight Loss [ ] Food & Nutrition Related Knowledge Deficit [x ] Malnutrition     Nutrition Diagnosis is [x ] ongoing  [ ] resolved [ ] not applicable     New Nutrition Diagnosis: [x ] not applicable       Interventions:   Recommend  [ ] Change Diet To:  [ ] Nutrition Supplement  [ ] Nutrition Support  [x ] Other: recommend add nepro BID and yadira BID , follow labs , weights    Monitoring and Evaluation:   [x ] PO intake [ x ] Tolerance to diet prescription [ x ] weights [ x ] labs[ x ] follow up per protocol  [ ] other:

## 2022-07-04 NOTE — PROGRESS NOTE ADULT - SUBJECTIVE AND OBJECTIVE BOX
CAPILLARY BLOOD GLUCOSE      POCT Blood Glucose.: 137 mg/dL (04 Jul 2022 08:10)  POCT Blood Glucose.: 164 mg/dL (03 Jul 2022 21:20)  POCT Blood Glucose.: 170 mg/dL (03 Jul 2022 17:02)      Vital Signs Last 24 Hrs  T(C): 36.7 (04 Jul 2022 11:35), Max: 36.8 (03 Jul 2022 12:00)  T(F): 98 (04 Jul 2022 11:35), Max: 98.2 (03 Jul 2022 12:00)  HR: 82 (04 Jul 2022 11:35) (71 - 92)  BP: 175/73 (04 Jul 2022 11:35) (144/67 - 175/73)  BP(mean): --  RR: 18 (04 Jul 2022 11:35) (18 - 18)  SpO2: 98% (04 Jul 2022 11:35) (90% - 98%)    General: WN/WD NAD  Respiratory: CTA B/L  CV: RRR, S1S2, no murmurs, rubs or gallops  Abdominal: Soft, NT, ND +BS, Last BM  Extremities: No edema, + peripheral pulses     07-03    138  |  99  |  39<H>  ----------------------------<  134<H>  4.5   |  31  |  4.70<H>    Ca    9.4      03 Jul 2022 05:10        atorvastatin 40 milliGRAM(s) Oral at bedtime  dextrose 50% Injectable 25 Gram(s) IV Push once  dextrose Oral Gel 15 Gram(s) Oral once PRN  glucagon  Injectable 1 milliGRAM(s) IntraMuscular once  insulin glargine Injectable (LANTUS) 8 Unit(s) SubCutaneous at bedtime  insulin lispro (ADMELOG) corrective regimen sliding scale   SubCutaneous three times a day before meals  insulin lispro (ADMELOG) corrective regimen sliding scale   SubCutaneous at bedtime

## 2022-07-04 NOTE — PROGRESS NOTE ADULT - ASSESSMENT
70 yo woman with Hx ERSD, PVD, CAD, CABG admitted 6/16 with R hallux gangrene requiring R fem-pop bypass and partial ray amputation.  Course complicated by   episode of junctional bradycardia (likely med related), resulting in cardiogenic shock and encephalopathy with transfer to ICU, later resolved and transferred to  telemetry, now Afib with RVR. \    Problem/Plan :  ·  Problem: Altered Mental Status. Suspect  metabolic encephalopathy along with mild cognitive impairment and subtle dementia becoming more prominent in the hospital setting.  · Patient back to cognitive/mental baseline, but per RN still with periods of confusion   - Fall precautions   -Speech and swallow evaluation - on soft bitesized diet       Problem/Plan :  ·  Problem: Type2 Diabetes, s/p  Hypoglycemia.  ·  Plan: - Patient presented with c/o generalized weakness and fall and was found to have blood sugar in 30's  - Patient with hx of T2DM, on Lantus 40 units qhs not on any oral hypoglycemics per outpatient pharmacy   - Accu checks  - Adjust Insulin dose based upon blood sugar. Endo Dr. Perlman   - continue ISS        Problem/Plan :  ·  Problem: SIRS (systemic inflammatory response syndrome). probably 2/2 to Rt toe infection  ·  Plan: -- CXR: no clear evidence of PNA   - Hx of L medial malleolus growing klebsiella oxytoca/raoutella oxytoca, E. coli, MSSA. Recent blood cultures negative.  - S/P R fem-BK pop w/PTFE POD# 5, S/P Partial ray amputation of R foot  POD #3  - reactive leucocytosis WBC 11.79 (7/3)  - Bone culture growing MSSA  - Blood culture NGTD  - Unasyn per ID recs, to continue until July 26  - ID recs appreciated    Problem/Plan :  ·  Problem: Pleural effusion.   ·  Plan: - No Hx of pulmonary disease  - Patient does not c/o cough, fever or chills  - CT Cervical shows Bilateral pleural effusions and interlobular septal thickening due to interstitial edema.  - ID Dr. Tsai, consult appreciated      Problem/Plan :  ·  Problem: HFrEF (heart failure with reduced ejection fraction).   ·  Plan: - patient with hx of HFrEF  - last ECHO in 04/22 shows EF of 45%, moderate MR  - Admission labs show ProBNP of 256118  - Likely elevated in the setting of ESRD  - Consult Dr. James, appreciate recs   - Avoid excessive fluids.     Problem/Plan :  ·  Problem: Elevated troponin.   ·  Plan: - Admission labs show elevated Troponin of 275.9  - Patient denies any chest pain, sob or palpitations  - EKG shows NSR with left axis deviation with non specific ST and T waves changes  - elevated troponin likely due to ESRD     Problem/Plan :  ·  Problem: ESRD on hemodialysis.   ·  Plan: - Patient with hx of ESRD  - On HD TTS schedule  - admission eGFR 9  - continue epoetin-fawad  - Follow Dr. Edwards      Problem/Plan :  ·  Problem: T2DM (type 2 diabetes mellitus).   ·  Plan: - Chronic stable  - home dose Lantus 40 qhs  - plan as above     Problem/Plan :  ·  Problem: Atrial fibrillation.   ·  Plan: - Patient with hx of P A.Fib  - Metoprolol 100 q12h  - Not on any AC because of hx of multiple falls   - EKG shows NSR with left axis deviation with non specific ST and T waves changes.     Problem/Plan :  ·  Problem: Benign essential HTN.   ·  Plan: - Chronic stable  - Continue clonidine 0.1mg BID  - Dash/Renal Diet  - continue to monitor routine hemodynamics.     Problem/Plan :  ·  Problem: HLD (hyperlipidemia).   ·  Plan; - Chronic stable  - On atorvastatin 40mg at home - will continue  - Dash/Renal diet.     Problem/Plan :  ·  Problem: Depression, major.   ·  Plan: - Chronic stable  - Continue imipramine 50qhs.     Problem/Plan :  Junctional bradycardia resulting in cardiogenic shock  - Transferred to ICU, now resolved  - Clonidine for htn  - Lopressor 100mg q12h per cardio recs        Problem/Plan :  ·  Problem: Need for prophylactic measure.   ·  Plan: - DVT Prophylaxis: Heparin 5000 units q12.

## 2022-07-04 NOTE — PROGRESS NOTE ADULT - ASSESSMENT
Patient is a 71 year old female with PMH of A.fib rate controlled not on AC for hx of multiple falls, T2DM, HTN, HLD, ESRD on HD, CHF, s/p CABG brought in by EMS for generalized weakness at home. Patient states that she was doing well when in the afternoon she tried to get up from her couch and felt weak in the LE and fell back in her couch. Denies any hx of head trauma or loss of consciousness. Denies any weakness or numbness. Denies any chest pain, SOB or palpitations. No fever, cough or chills. No hx of recent travel or sick contacts. At the time of EMS arrival patient was found to have POCBS of 50. EMS gave dextrose and on arrival to the ED patient blood sugar was found to be in 30's with hypothermia of 94.9.    ED course:   Vitals:   BP: 176/85  HR:76  Temp:94.2  RR:18, O2:96% on RA   Significant Labs:   CBC: WBC:16.82 Hb:13.2 , Platlets: 260, Neutro:89   CMP: Lytes: WNL, BUN/Creatinine:48/4.8, Glucose:134 , Alkaline Phos:128, AST, 41, eGFR: 9, HsTrop: 275.9, ProBNP: 600674, Lactate: 2  UA; negative  CXR: Heart and lung appear normal (self read)  CT BRAIN: No acute intracranial bleeding. Central volume loss, chronic microvascular ischemic changes, and chronic bilateral lacunar infarctions.  CT CERVICAL SPINE: No fracture. Grade 1 C4-C5 anterolisthesis. C6-C7 disc degeneration. Bilateral pleural effusions and interlobular septal thickening due to   interstitial edema.  EKG: NSR, left axis deviation, HR 71,   QT/QTc: 426/462  Patient received: Ceftriaxone 1 g x1, Dextrose 5% + NS 1L x1, Dextrose 50% IV X1, heating blanket, 2L NC   (16 Jun 2022 01:19)        esrd on hd plan for hd as order      Excess fluids and waste products will be removed from your blood; your electrolytes will be balanced; your blood pressure will be controlled.    ANEMIA PLAN:  Anemia of chronic disease:  We will continue epo  aiming for a HCT of 32-36 %.   We will monitor Iron stores, B12 and RBC folate .    BP monitoring,continue current antihypertensive meds, low salt diet  metoprolol tartrate 100 milliGRAM(s) Oral every 12 hours  cloNIDine 0.1 milliGRAM(s) Oral two times a day    f/u  blood and urine cx,serial lactate levels,monitor vitals closley,ivfs hydration,monitor urine output and renal profile,iv abx  ampicillin/sulbactam  IVPB 3 Gram(s) IV Intermittent every 24 hours    dvt heparin   Injectable 5000 Unit(s) SubCutaneous every 12 hours

## 2022-07-05 LAB
ANION GAP SERPL CALC-SCNC: 7 MMOL/L — SIGNIFICANT CHANGE UP (ref 5–17)
BUN SERPL-MCNC: 24 MG/DL — HIGH (ref 7–23)
CALCIUM SERPL-MCNC: 9.2 MG/DL — SIGNIFICANT CHANGE UP (ref 8.5–10.1)
CHLORIDE SERPL-SCNC: 100 MMOL/L — SIGNIFICANT CHANGE UP (ref 96–108)
CO2 SERPL-SCNC: 32 MMOL/L — HIGH (ref 22–31)
CREAT SERPL-MCNC: 3.8 MG/DL — HIGH (ref 0.5–1.3)
EGFR: 12 ML/MIN/1.73M2 — LOW
GLUCOSE SERPL-MCNC: 74 MG/DL — SIGNIFICANT CHANGE UP (ref 70–99)
HAV IGM SER-ACNC: SIGNIFICANT CHANGE UP
HBV CORE IGM SER-ACNC: SIGNIFICANT CHANGE UP
HBV SURFACE AG SER-ACNC: SIGNIFICANT CHANGE UP
HCT VFR BLD CALC: 29.7 % — LOW (ref 34.5–45)
HCV AB S/CO SERPL IA: 0.22 S/CO — SIGNIFICANT CHANGE UP (ref 0–0.99)
HCV AB SERPL-IMP: SIGNIFICANT CHANGE UP
HGB BLD-MCNC: 9.1 G/DL — LOW (ref 11.5–15.5)
HIV 1+2 AB+HIV1 P24 AG SERPL QL IA: SIGNIFICANT CHANGE UP
MCHC RBC-ENTMCNC: 28.3 PG — SIGNIFICANT CHANGE UP (ref 27–34)
MCHC RBC-ENTMCNC: 30.6 GM/DL — LOW (ref 32–36)
MCV RBC AUTO: 92.2 FL — SIGNIFICANT CHANGE UP (ref 80–100)
NRBC # BLD: 0 /100 WBCS — SIGNIFICANT CHANGE UP (ref 0–0)
PLATELET # BLD AUTO: 467 K/UL — HIGH (ref 150–400)
POTASSIUM SERPL-MCNC: 3.9 MMOL/L — SIGNIFICANT CHANGE UP (ref 3.5–5.3)
POTASSIUM SERPL-SCNC: 3.9 MMOL/L — SIGNIFICANT CHANGE UP (ref 3.5–5.3)
RBC # BLD: 3.22 M/UL — LOW (ref 3.8–5.2)
RBC # FLD: 18.8 % — HIGH (ref 10.3–14.5)
SODIUM SERPL-SCNC: 139 MMOL/L — SIGNIFICANT CHANGE UP (ref 135–145)
WBC # BLD: 11.9 K/UL — HIGH (ref 3.8–10.5)
WBC # FLD AUTO: 11.9 K/UL — HIGH (ref 3.8–10.5)

## 2022-07-05 PROCEDURE — 99233 SBSQ HOSP IP/OBS HIGH 50: CPT | Mod: GC

## 2022-07-05 RX ADMIN — Medication 100 MILLIGRAM(S): at 19:04

## 2022-07-05 RX ADMIN — Medication 100 MILLIGRAM(S): at 07:41

## 2022-07-05 RX ADMIN — RISPERIDONE 0.5 MILLIGRAM(S): 4 TABLET ORAL at 19:04

## 2022-07-05 RX ADMIN — INSULIN GLARGINE 8 UNIT(S): 100 INJECTION, SOLUTION SUBCUTANEOUS at 21:59

## 2022-07-05 RX ADMIN — Medication 50 MILLIGRAM(S): at 11:38

## 2022-07-05 RX ADMIN — RISPERIDONE 0.5 MILLIGRAM(S): 4 TABLET ORAL at 07:43

## 2022-07-05 RX ADMIN — HEPARIN SODIUM 5000 UNIT(S): 5000 INJECTION INTRAVENOUS; SUBCUTANEOUS at 19:03

## 2022-07-05 RX ADMIN — CLOPIDOGREL BISULFATE 75 MILLIGRAM(S): 75 TABLET, FILM COATED ORAL at 11:38

## 2022-07-05 RX ADMIN — Medication 81 MILLIGRAM(S): at 11:38

## 2022-07-05 RX ADMIN — ZINC SULFATE TAB 220 MG (50 MG ZINC EQUIVALENT) 220 MILLIGRAM(S): 220 (50 ZN) TAB at 11:44

## 2022-07-05 RX ADMIN — Medication 1 TABLET(S): at 11:44

## 2022-07-05 RX ADMIN — CHLORHEXIDINE GLUCONATE 1 APPLICATION(S): 213 SOLUTION TOPICAL at 05:29

## 2022-07-05 RX ADMIN — AMPICILLIN SODIUM AND SULBACTAM SODIUM 200 GRAM(S): 250; 125 INJECTION, POWDER, FOR SUSPENSION INTRAMUSCULAR; INTRAVENOUS at 21:58

## 2022-07-05 RX ADMIN — Medication 0.1 MILLIGRAM(S): at 19:03

## 2022-07-05 RX ADMIN — ATORVASTATIN CALCIUM 40 MILLIGRAM(S): 80 TABLET, FILM COATED ORAL at 21:58

## 2022-07-05 RX ADMIN — Medication 0.1 MILLIGRAM(S): at 07:41

## 2022-07-05 NOTE — PROGRESS NOTE ADULT - PROBLEM SELECTOR PLAN 1
S/p Right hallux amputation, (DOS: 6/23/22)  - s/p RLE bypass graft  - Dressing change today with Acticoat and DSD.   - Continue IV abx per ID  - Continue vascular recs  - Continue med management   - OR culture Staph Aureus & E. faecalis  - OR pathology: right 1st met clean margin: Minimal chronic osteomyelitis  - Pt to partial weight bear to the right heel as tolerated with assisted device (pt has a history of falls)  - No further podiatric intervention at this time. Pt is stable from out standpoint    WOUND CARE INSTRUCTIONS  1. Please leave dressing clean, dry and intact until follow-up. Monitor for any signs of infection and present to the ED if they arise.  2. If the dressing gets wet, please change with dry, sterile dressing.  3. Patient to be partial weight bear to the right heel as tolerated with assisted device (pt has a history of falls)  4. Patient is to follow up [facility] with Dr. Lau or Dr. Pastor within 3-5 days upon discharge. Please call [phone] as soon as discharged and state that it is for a "post-op appointment".

## 2022-07-05 NOTE — PHYSICAL THERAPY INITIAL EVALUATION ADULT - MANUAL MUSCLE TESTING RESULTS, REHAB EVAL
Patient lethargic, unable to complete formal MMT assessment, demonstrated spontaneous movement of all 4 extremities against gravity
(B) UE and (B) LE >3+/5 upon functional assessment against gravity.

## 2022-07-05 NOTE — PHYSICAL THERAPY INITIAL EVALUATION ADULT - PERTINENT HX OF CURRENT PROBLEM, REHAB EVAL
71F Hx ERSD, PVD, CAD, CABG admitted 6/16 with R hallux gangrene requiring R fem-pop bypass and partial ray amputation.  Course complicated by episode of junctional bradycardia (likely med related), resulting in cardiogenic shock and encephalopathy with transfer to ICU, later resolved and transferred to  telemetry, now Afib with RVR.
Patient is a 71 year old female with PMH of A.fib rate controlled not on AC for hx of multiple falls, T2DM, HTN, HLD, ESRD on HD, CHF, s/p CABG brought in by EMS for generalized weakness at home and was found to be hypoglycemic and meets SIRS criteria.

## 2022-07-05 NOTE — PHYSICAL THERAPY INITIAL EVALUATION ADULT - GAIT TRAINING, PT EVAL
GOAL: Patient will ambulate independently x 200 feet with use of rolling walker within 2 weeks.
GOAL: Patient will ambulate independently x 200 feet with use of rolling walker within 2 weeks.

## 2022-07-05 NOTE — PROGRESS NOTE ADULT - ASSESSMENT
Patient is a 71 year old female with PMH of A.fib rate controlled not on AC for hx of multiple falls, T2DM, HTN, HLD, ESRD on HD, CHF, s/p CABG brought in by EMS for generalized weakness at home. Patient states that she was doing well when in the afternoon she tried to get up from her couch and felt weak in the LE and fell back in her couch. Denies any hx of head trauma or loss of consciousness. Denies any weakness or numbness. Denies any chest pain, SOB or palpitations. No fever, cough or chills. No hx of recent travel or sick contacts. At the time of EMS arrival patient was found to have POCBS of 50. EMS gave dextrose and on arrival to the ED patient blood sugar was found to be in 30's with hypothermia of 94.9.    ED course:   Vitals:   BP: 176/85  HR:76  Temp:94.2  RR:18, O2:96% on RA   Significant Labs:   CBC: WBC:16.82 Hb:13.2 , Platlets: 260, Neutro:89   CMP: Lytes: WNL, BUN/Creatinine:48/4.8, Glucose:134 , Alkaline Phos:128, AST, 41, eGFR: 9, HsTrop: 275.9, ProBNP: 612421, Lactate: 2  UA; negative  CXR: Heart and lung appear normal (self read)  CT BRAIN: No acute intracranial bleeding. Central volume loss, chronic microvascular ischemic changes, and chronic bilateral lacunar infarctions.  CT CERVICAL SPINE: No fracture. Grade 1 C4-C5 anterolisthesis. C6-C7 disc degeneration. Bilateral pleural effusions and interlobular septal thickening due to   interstitial edema.  EKG: NSR, left axis deviation, HR 71,   QT/QTc: 426/462  Patient received: Ceftriaxone 1 g x1, Dextrose 5% + NS 1L x1, Dextrose 50% IV X1, heating blanket, 2L NC   (16 Jun 2022 01:19)        esrd on hd plan for hd as order      Excess fluids and waste products will be removed from your blood; your electrolytes will be balanced; your blood pressure will be controlled.    ANEMIA PLAN:  Anemia of chronic disease:  We will continue epo  aiming for a HCT of 32-36 %.   We will monitor Iron stores, B12 and RBC folate .    BP monitoring,continue current antihypertensive meds, low salt diet  metoprolol tartrate 100 milliGRAM(s) Oral every 12 hours  cloNIDine 0.1 milliGRAM(s) Oral two times a day    f/u  blood and urine cx,serial lactate levels,monitor vitals closley,ivfs hydration,monitor urine output and renal profile,iv abx  ampicillin/sulbactam  IVPB 3 Gram(s) IV Intermittent every 24 hours    dvt heparin   Injectable 5000 Unit(s) SubCutaneous every 12 hours

## 2022-07-05 NOTE — PROGRESS NOTE ADULT - ASSESSMENT
70 yo woman with Hx ERSD, PVD, CAD, CABG admitted 6/16 with R hallux gangrene requiring R fem-pop bypass and partial ray amputation.  Course complicated by   episode of junctional bradycardia (likely med related), resulting in cardiogenic shock and encephalopathy with transfer to ICU, later resolved and transferred to  telemetry, now Afib with RVR. \    Problem/Plan :  ·  Problem: Altered Mental Status. Suspect  metabolic encephalopathy along with mild cognitive impairment and subtle dementia becoming more prominent in the hospital setting.  · Patient back to cognitive/mental baseline, but per RN still with periods of confusion   - Fall precautions   -Speech and swallow evaluation - on soft bitesized diet       Problem/Plan :  ·  Problem: Type2 Diabetes, s/p  Hypoglycemia.  ·  Plan: - Patient presented with c/o generalized weakness and fall and was found to have blood sugar in 30's  - Patient with hx of T2DM, on Lantus 40 units qhs not on any oral hypoglycemics per outpatient pharmacy   - Accu checks  - Adjust Insulin dose based upon blood sugar. Endo Dr. Perlman   - continue ISS        Problem/Plan :  ·  Problem: SIRS (systemic inflammatory response syndrome). probably 2/2 to Rt toe infection  ·  Plan: -- CXR: no clear evidence of PNA   - Hx of L medial malleolus growing klebsiella oxytoca/raoutella oxytoca, E. coli, MSSA. Recent blood cultures negative.  - S/P R fem-BK pop w/PTFE POD# 5, S/P Partial ray amputation of R foot  POD #3  - reactive leucocytosis WBC 11.9 (7/5)  - Bone culture growing MSSA  - Blood culture NGTD  - Unasyn per ID recs, to continue until July 26  - ID recs appreciated    Problem/Plan :  ·  Problem: Pleural effusion.   ·  Plan: - No Hx of pulmonary disease  - Patient does not c/o cough, fever or chills  - CT Cervical shows Bilateral pleural effusions and interlobular septal thickening due to interstitial edema.  - ID Dr. Tsai, consult appreciated      Problem/Plan :  ·  Problem: HFrEF (heart failure with reduced ejection fraction).   ·  Plan: - patient with hx of HFrEF  - last ECHO in 04/22 shows EF of 45%, moderate MR  - Admission labs show ProBNP of [id number]  - Likely elevated in the setting of ESRD  - Consult Dr. James, appreciate recs   - Avoid excessive fluids.     Problem/Plan :  ·  Problem: Elevated troponin.   ·  Plan: - Admission labs show elevated Troponin of 275.9  - Patient denies any chest pain, sob or palpitations  - EKG shows NSR with left axis deviation with non specific ST and T waves changes  - elevated troponin likely due to ESRD     Problem/Plan :  ·  Problem: ESRD on hemodialysis.   ·  Plan: - Patient with hx of ESRD  - On HD TTS schedule  - admission eGFR 9  - continue epoetin-fawad  - Follow Dr. Edwards      Problem/Plan :  ·  Problem: T2DM (type 2 diabetes mellitus).   ·  Plan: - Chronic stable  - home dose Lantus 40 qhs  - plan as above     Problem/Plan :  ·  Problem: Atrial fibrillation.   ·  Plan: - Patient with hx of P A.Fib  - Metoprolol 100 q12h  - Not on any AC because of hx of multiple falls   - EKG shows NSR with left axis deviation with non specific ST and T waves changes.     Problem/Plan :  ·  Problem: Benign essential HTN.   ·  Plan: - Chronic stable  - Continue clonidine 0.1mg BID  - Dash/Renal Diet  - continue to monitor routine hemodynamics.     Problem/Plan :  ·  Problem: HLD (hyperlipidemia).   ·  Plan; - Chronic stable  - On atorvastatin 40mg at home - will continue  - Dash/Renal diet.     Problem/Plan :  ·  Problem: Depression, major.   ·  Plan: - Chronic stable  - Continue imipramine 50qhs.     Problem/Plan :  Junctional bradycardia resulting in cardiogenic shock  - Transferred to ICU, now resolved  - Clonidine for htn  - Lopressor 100mg q12h per cardio recs        Problem/Plan :  ·  Problem: Need for prophylactic measure.   ·  Plan: - DVT Prophylaxis: Heparin 5000 units q12. 70 yo woman with Hx ERSD, PVD, CAD, CABG admitted 6/16 with R hallux gangrene requiring R fem-pop bypass and partial ray amputation.  Course complicated by   episode of junctional bradycardia (likely med related), resulting in cardiogenic shock and encephalopathy with transfer to ICU, later resolved and transferred to  telemetry, hospital course complicated by  Afib with RVR, encephalopathy.     Problem/Plan :  ·  Problem: Altered Mental Status. Suspect  metabolic encephalopathy along with mild cognitive impairment and subtle dementia becoming more prominent in the hospital setting.  · Patient back to cognitive/mental baseline, but per RN still with periods of confusion   - Fall precautions   -Speech and swallow evaluation - on soft bitesized diet       Problem/Plan :  ·  Problem: Type2 Diabetes, s/p  Hypoglycemia.  ·  Plan: - Patient presented with c/o generalized weakness and fall and was found to have blood sugar in 30's  - Patient with hx of T2DM, on Lantus 40 units qhs not on any oral hypoglycemics per outpatient pharmacy   - Accu checks  - Adjust Insulin dose based upon blood sugar. Endo Dr. Perlman   - continue ISS        Problem/Plan :  ·  Problem: SIRS (systemic inflammatory response syndrome). probably 2/2 to Rt toe infection  ·  Plan: -- CXR: no clear evidence of PNA   - Hx of L medial malleolus growing klebsiella oxytoca/raoutella oxytoca, E. coli, MSSA. Recent blood cultures negative.  - S/P R fem-BK pop w/PTFE POD# 5, S/P Partial ray amputation of R foot  POD #3  - reactive leucocytosis WBC 11.9 (7/5)  - Bone culture growing MSSA  - Blood culture NGTD  - Unasyn per ID recs, to continue until July 26  - ID recs appreciated    Problem/Plan :  ·  Problem: Pleural effusion.   ·  Plan: - No Hx of pulmonary disease  - Patient does not c/o cough, fever or chills  - CT Cervical shows Bilateral pleural effusions and interlobular septal thickening due to interstitial edema.  - ID Dr. Tsai, consult appreciated      Problem/Plan :  ·  Problem: HFrEF (heart failure with reduced ejection fraction).   ·  Plan: - patient with hx of HFrEF  - last ECHO in 04/22 shows EF of 45%, moderate MR  - Admission labs show ProBNP of [id number]  - Likely elevated in the setting of ESRD  - Consult Dr. James, appreciate recs   - Avoid excessive fluids.     Problem/Plan :  ·  Problem: Elevated troponin.   ·  Plan: - Admission labs show elevated Troponin of 275.9  - Patient denies any chest pain, sob or palpitations  - EKG shows NSR with left axis deviation with non specific ST and T waves changes  - elevated troponin likely due to ESRD     Problem/Plan :  ·  Problem: ESRD on hemodialysis.   ·  Plan: - Patient with hx of ESRD  - On HD TTS schedule  - admission eGFR 9  - continue epoetin-fawad  - Follow Dr. Edwards      Problem/Plan :  ·  Problem: T2DM (type 2 diabetes mellitus).   ·  Plan: - Chronic stable  - home dose Lantus 40 qhs  - plan as above     Problem/Plan :  ·  Problem: Atrial fibrillation.   ·  Plan: - Patient with hx of P A.Fib  - Metoprolol 100 q12h  - Not on any AC because of hx of multiple falls   - EKG shows NSR with left axis deviation with non specific ST and T waves changes.     Problem/Plan :  ·  Problem: Benign essential HTN.   ·  Plan: - Chronic stable  - Continue clonidine 0.1mg BID  - Dash/Renal Diet  - continue to monitor routine hemodynamics.     Problem/Plan :  ·  Problem: HLD (hyperlipidemia).   ·  Plan; - Chronic stable  - On atorvastatin 40mg at home - will continue  - Dash/Renal diet.     Problem/Plan :  ·  Problem: Depression, major.   ·  Plan: - Chronic stable  - Continue imipramine 50qhs.     Problem/Plan :  Junctional bradycardia resulting in cardiogenic shock  - Transferred to ICU, now resolved  - Clonidine for htn  - Lopressor 100mg q12h per cardio recs        Problem/Plan :  ·  Problem: Need for prophylactic measure.   ·  Plan: - DVT Prophylaxis: Heparin 5000 units q12.

## 2022-07-05 NOTE — PHYSICAL THERAPY INITIAL EVALUATION ADULT - BED MOBILITY TRAINING, PT EVAL
GOAL: Patient will complete bed mobility independently (rolling, supine<>sit) within 2 weeks.
GOAL: Patient will complete bed mobility independently (rolling, supine<>sit) within 2 weeks.

## 2022-07-05 NOTE — PROGRESS NOTE ADULT - SUBJECTIVE AND OBJECTIVE BOX
Neurology follow up note    SHILO [id number]      Interval History:    Patient feels ok no new complaints.    Allergies    latex (Unknown)  No Known Drug Allergies    Intolerances        MEDICATIONS    acetaminophen     Tablet .. 650 milliGRAM(s) Oral every 6 hours PRN  aluminum hydroxide/magnesium hydroxide/simethicone Suspension 30 milliLiter(s) Oral every 4 hours PRN  ampicillin/sulbactam  IVPB 3 Gram(s) IV Intermittent every 24 hours  ampicillin/sulbactam  IVPB      aspirin enteric coated 81 milliGRAM(s) Oral daily  atorvastatin 40 milliGRAM(s) Oral at bedtime  chlorhexidine 4% Liquid 1 Application(s) Topical <User Schedule>  cloNIDine 0.1 milliGRAM(s) Oral two times a day  clopidogrel Tablet 75 milliGRAM(s) Oral daily  dextrose 5%. 1000 milliLiter(s) IV Continuous <Continuous>  dextrose 50% Injectable 25 Gram(s) IV Push once  dextrose Oral Gel 15 Gram(s) Oral once PRN  epoetin fawad-epbx (RETACRIT) Injectable 37760 Unit(s) IV Push <User Schedule>  glucagon  Injectable 1 milliGRAM(s) IntraMuscular once  heparin   Injectable 5000 Unit(s) SubCutaneous every 12 hours  imipramine 50 milliGRAM(s) Oral daily  insulin glargine Injectable (LANTUS) 8 Unit(s) SubCutaneous at bedtime  insulin lispro (ADMELOG) corrective regimen sliding scale   SubCutaneous three times a day before meals  insulin lispro (ADMELOG) corrective regimen sliding scale   SubCutaneous at bedtime  melatonin 3 milliGRAM(s) Oral at bedtime PRN  metoprolol tartrate 100 milliGRAM(s) Oral every 12 hours  OLANZapine Injectable 2.5 milliGRAM(s) IntraMuscular every 6 hours PRN  pantoprazole    Tablet 40 milliGRAM(s) Oral before breakfast  risperiDONE   Tablet 0.5 milliGRAM(s) Oral two times a day              Vital Signs Last 24 Hrs  T(C): 36.8 (05 Jul 2022 05:25), Max: 36.9 (04 Jul 2022 19:53)  T(F): 98.3 (05 Jul 2022 05:25), Max: 98.4 (04 Jul 2022 19:53)  HR: 88 (05 Jul 2022 05:25) (75 - 95)  BP: 146/59 (05 Jul 2022 05:25) (122/74 - 190/80)  BP(mean): --  RR: 18 (05 Jul 2022 05:25) (17 - 18)  SpO2: 91% (05 Jul 2022 05:25) (91% - 98%)      REVIEW OF SYSTEMS:  Slightly limited secondary to the patient being altered, but constitutionally, she denies fever, chills, or night sweats.  Head:  No headaches.  Eyes:  No double vision or blurry vision.  Ears:  No ringing in the ears.  Neck:  No neck pain.  Cardiovascular:  No chest pain.  Respiratory:  No shortness of breath.  Abdomen:  No nausea, vomiting, or abdominal pain.  Extremities/Neurological:  No numbness or tingling.  Musculoskeletal:  No joint pain.      PHYSICAL EXAMINATION:   HEENT:  Head:  Normocephalic, atraumatic.  Eyes:  No scleral icterus.  Ears:  Hearing appeared to be intact.  NECK:  Supple.  CARDIOVASCULAR:  S1 and S2 are heard.  RESPIRATORY:  Decreased breath sounds bilaterally, gives poor effort.  ABDOMEN:  Soft, nontender.  EXTREMITIES:  No clubbing or cyanosis was noted.      NEUROLOGIC:  The patient was arousable to verbal stimuli.  Location unknown, year was 2004, was able to name the number of fingers in each eye.  Pupils bilaterally were 2 mm, slightly reactive.  Speech was fluent.  Smile symmetric.  Motor, bilateral upper 4/5, bilateral lower 4-/5.  The patient, per previous note, does have significantly impaired proprioception, bilateral extremities, and knee jerks were trace, suspect this is from underlying diabetes.               LABS:  CBC Full  -  ( 04 Jul 2022 12:05 )  WBC Count : 9.84 K/uL  RBC Count : 2.96 M/uL  Hemoglobin : 8.3 g/dL  Hematocrit : 27.1 %  Platelet Count - Automated : 454 K/uL  Mean Cell Volume : 91.6 fl  Mean Cell Hemoglobin : 28.0 pg  Mean Cell Hemoglobin Concentration : 30.6 gm/dL  Auto Neutrophil # : 7.44 K/uL  Auto Lymphocyte # : 0.79 K/uL  Auto Monocyte # : 0.96 K/uL  Auto Eosinophil # : 0.37 K/uL  Auto Basophil # : 0.06 K/uL  Auto Neutrophil % : 75.6 %  Auto Lymphocyte % : 8.0 %  Auto Monocyte % : 9.8 %  Auto Eosinophil % : 3.8 %  Auto Basophil % : 0.6 %      07-04    136  |  98  |  52<H>  ----------------------------<  149<H>  5.4<H>   |  25  |  6.10<H>    Ca    9.5      04 Jul 2022 12:05  Phos  4.0     07-04  Mg     2.7     07-04      Hemoglobin A1C:       Vitamin B12         RADIOLOGY    ANALYSIS AND PLAN:  This is a 71-year-old with episode of altered mental status.    For episode of altered mental status, suspect this is multifactorial secondary to underlying metabolic encephalopathy along with mild cognitive impairment and subtle dementia becoming more prominent in the hospital setting.  Examination was limited, but I see no clear focality to suggest a new cerebrovascular accident has ensued.  For history of ataxia and falls, most likely multifactorial secondary to age-related changes and neuropathy.  For history of diabetes, strict control of blood sugars.  For history of hypertension, monitor systolic blood pressure.  For hyperlipidemia, statin.  For mild cognitive impairment versus subtle dementia, for now supportive therapy will have episodes of on and off AMS  no new events     spoke to spouse, Geoffrey, at [phone] 7/1    Greater than 40 minutes of time was spent with the patient, plan of care, reviewing data, with greater than 50% of the visit was spent counseling and/or coordinating care with multidisciplinary healthcare team

## 2022-07-05 NOTE — PHYSICAL THERAPY INITIAL EVALUATION ADULT - DIAGNOSIS, PT EVAL
Patient presents with decreased strength, balance, and endurance resulting in impaired functional mobility putting patient at increased risk for falls.
Patient presents with decreased strength, balance, and endurance resulting in impaired functional mobility putting patient at increased risk for falls.

## 2022-07-05 NOTE — PHYSICAL THERAPY INITIAL EVALUATION ADULT - TRANSFER TRAINING, PT EVAL
GOAL: Patient will transfer between sitting/standing independently with use of rolling walker within 2 weeks.
GOAL: Patient will transfer between sitting/standing independently with use of rolling walker within 2 weeks.

## 2022-07-05 NOTE — CHART NOTE - NSCHARTNOTEFT_GEN_A_CORE
called by RN, stuck by dirty needle. as per RN, and nursing management, to order HIV, hepatitis panel for patient.

## 2022-07-05 NOTE — PROGRESS NOTE ADULT - SUBJECTIVE AND OBJECTIVE BOX
Patient is a 71y Female whom presented to the hospital with esrd on hd     PAST MEDICAL & SURGICAL HISTORY:  Diabetes mellitus II      HTN (hypertension)      h/o Anxiety attack      Depression      h/o Myocardial infarct 2007      CAD (coronary artery disease)      h/o Hepatitis A 1969  currently resolved, no symptoms      PAD (peripheral artery disease)      Murmur, cardiac      h/o Smoking  quitted 3/2012      CRF (chronic renal failure), unspecified stage      Dialysis patient      Anemia secondary to renal failure      HTN (hypertension)      coronary stent 2007      s/p Ovarian cyst removal      s/p surgical removal of benign Skin lesion epigastric area          MEDICATIONS  (STANDING):  amLODIPine   Tablet 5 milliGRAM(s) Oral daily  aspirin enteric coated 81 milliGRAM(s) Oral daily  atorvastatin 40 milliGRAM(s) Oral at bedtime  calcium acetate 667 milliGRAM(s) Oral three times a day with meals  cefTRIAXone   IVPB 1000 milliGRAM(s) IV Intermittent every 24 hours  cloNIDine 0.1 milliGRAM(s) Oral two times a day  clopidogrel Tablet 75 milliGRAM(s) Oral daily  dextrose 5%. 1000 milliLiter(s) (100 mL/Hr) IV Continuous <Continuous>  dextrose 5%. 1000 milliLiter(s) (50 mL/Hr) IV Continuous <Continuous>  dextrose 50% Injectable 25 Gram(s) IV Push once  dextrose 50% Injectable 12.5 Gram(s) IV Push once  dextrose 50% Injectable 25 Gram(s) IV Push once  diltiazem    Tablet 60 milliGRAM(s) Oral every 8 hours  glucagon  Injectable 1 milliGRAM(s) IntraMuscular once  heparin   Injectable 5000 Unit(s) SubCutaneous every 12 hours  imipramine 50 milliGRAM(s) Oral daily  insulin glargine Injectable (LANTUS) 12 Unit(s) SubCutaneous at bedtime  insulin lispro (ADMELOG) corrective regimen sliding scale   SubCutaneous three times a day before meals  insulin lispro (ADMELOG) corrective regimen sliding scale   SubCutaneous at bedtime  metoprolol tartrate 100 milliGRAM(s) Oral every 12 hours  pantoprazole    Tablet 40 milliGRAM(s) Oral before breakfast  povidone iodine 10% Solution 1 Application(s) Topical daily      Allergies    latex (Unknown)  No Known Drug Allergies    Intolerances        SOCIAL HISTORY:  Denies ETOh,Smoking,     FAMILY HISTORY:  No pertinent family history in first degree relatives        REVIEW OF SYSTEMS:    CONSTITUTIONAL: No weakness, fevers or chills  RESPIRATORY: No cough, wheezing, hemoptysis; No shortness of breath  CARDIOVASCULAR: No chest pain or palpitations                                                                                             9.1    11.90 )-----------( 467      ( 05 Jul 2022 07:45 )             29.7       CBC Full  -  ( 05 Jul 2022 07:45 )  WBC Count : 11.90 K/uL  RBC Count : 3.22 M/uL  Hemoglobin : 9.1 g/dL  Hematocrit : 29.7 %  Platelet Count - Automated : 467 K/uL  Mean Cell Volume : 92.2 fl  Mean Cell Hemoglobin : 28.3 pg  Mean Cell Hemoglobin Concentration : 30.6 gm/dL  Auto Neutrophil # : x  Auto Lymphocyte # : x  Auto Monocyte # : x  Auto Eosinophil # : x  Auto Basophil # : x  Auto Neutrophil % : x  Auto Lymphocyte % : x  Auto Monocyte % : x  Auto Eosinophil % : x  Auto Basophil % : x      07-05    139  |  100  |  24<H>  ----------------------------<  74  3.9   |  32<H>  |  3.80<H>    Ca    9.2      05 Jul 2022 07:45  Phos  4.0     07-04  Mg     2.7     07-04        CAPILLARY BLOOD GLUCOSE      POCT Blood Glucose.: 237 mg/dL (05 Jul 2022 21:33)  POCT Blood Glucose.: 86 mg/dL (05 Jul 2022 12:10)  POCT Blood Glucose.: 81 mg/dL (05 Jul 2022 08:26)      Vital Signs Last 24 Hrs  T(C): 36.2 (05 Jul 2022 19:41), Max: 36.8 (05 Jul 2022 05:25)  T(F): 97.2 (05 Jul 2022 19:41), Max: 98.3 (05 Jul 2022 05:25)  HR: 82 (05 Jul 2022 19:41) (65 - 88)  BP: 157/66 (05 Jul 2022 19:41) (111/64 - 157/66)  BP(mean): --  RR: 18 (05 Jul 2022 19:41) (18 - 18)  SpO2: 96% (05 Jul 2022 19:41) (91% - 96%)                                                                                                                               PHYSICAL EXAM:    Constitutional: NAD  HEENT: conjunctive   clear   Neck:  No JVD  Respiratory: CTAB  Cardiovascular: S1 and S2  Gastrointestinal: BS+, soft, NT/ND  Skin: dry

## 2022-07-05 NOTE — PHYSICAL THERAPY INITIAL EVALUATION ADULT - IMPAIRMENTS FOUND, PT EVAL
aerobic capacity/endurance/cognitive impairment/gait, locomotion, and balance/muscle strength
aerobic capacity/endurance/gait, locomotion, and balance/muscle strength

## 2022-07-05 NOTE — PHYSICAL THERAPY INITIAL EVALUATION ADULT - ADDITIONAL COMMENTS
Patient reports that she lives in a private house with 3 HIRA, bed/bath on main level. Independent with ADLs, ambulates with RW, also has w/c and cane. States that she has an aide "a few days a week" however unsure of hours. Of note, patient states that her  is in [facility] s/p CVA.
Per PT Eval dated 6/16/22 (this admission): Patient reports that she lives in a private house with 3 HIRA, bed/bath on main level. Independent with ADLs, ambulates with RW, also has w/c and cane. States that she has an aide "a few days a week" however unsure of hours. Of note, patient states that her  is in [place] s/p CVA.

## 2022-07-05 NOTE — PROGRESS NOTE ADULT - SUBJECTIVE AND OBJECTIVE BOX
72 y/o female seen at bedside. S/p R hallux amputation. Pt doing well, AAOx3. Pt resting in bed comfortably. Dressing to the right foot clean, dry, and intact.     MEDICATIONS  (STANDING):  ampicillin/sulbactam  IVPB 3 Gram(s) IV Intermittent every 24 hours  ampicillin/sulbactam  IVPB      aspirin enteric coated 81 milliGRAM(s) Oral daily  atorvastatin 40 milliGRAM(s) Oral at bedtime  chlorhexidine 4% Liquid 1 Application(s) Topical <User Schedule>  cloNIDine 0.1 milliGRAM(s) Oral two times a day  clopidogrel Tablet 75 milliGRAM(s) Oral daily  dextrose 5%. 1000 milliLiter(s) (50 mL/Hr) IV Continuous <Continuous>  dextrose 50% Injectable 25 Gram(s) IV Push once  epoetin fawad-epbx (RETACRIT) Injectable 08101 Unit(s) IV Push <User Schedule>  glucagon  Injectable 1 milliGRAM(s) IntraMuscular once  heparin   Injectable 5000 Unit(s) SubCutaneous every 12 hours  imipramine 50 milliGRAM(s) Oral daily  insulin glargine Injectable (LANTUS) 8 Unit(s) SubCutaneous at bedtime  insulin lispro (ADMELOG) corrective regimen sliding scale   SubCutaneous at bedtime  insulin lispro (ADMELOG) corrective regimen sliding scale   SubCutaneous three times a day before meals  metoprolol tartrate 100 milliGRAM(s) Oral every 12 hours  Nephro-elvie 1 Tablet(s) Oral daily  pantoprazole    Tablet 40 milliGRAM(s) Oral before breakfast  risperiDONE   Tablet 0.5 milliGRAM(s) Oral two times a day  zinc sulfate 220 milliGRAM(s) Oral daily    MEDICATIONS  (PRN):  acetaminophen     Tablet .. 650 milliGRAM(s) Oral every 6 hours PRN Temp greater or equal to 38C (100.4F), Mild Pain (1 - 3)  aluminum hydroxide/magnesium hydroxide/simethicone Suspension 30 milliLiter(s) Oral every 4 hours PRN Dyspepsia  dextrose Oral Gel 15 Gram(s) Oral once PRN Blood Glucose LESS THAN 70 milliGRAM(s)/deciliter  melatonin 3 milliGRAM(s) Oral at bedtime PRN Insomnia  OLANZapine Injectable 2.5 milliGRAM(s) IntraMuscular every 6 hours PRN Acute agitation    Vital Signs Last 24 Hrs  T(C): 36.7 (05 Jul 2022 13:27), Max: 36.9 (04 Jul 2022 19:53)  T(F): 98.1 (05 Jul 2022 13:27), Max: 98.4 (04 Jul 2022 19:53)  HR: 65 (05 Jul 2022 13:27) (65 - 95)  BP: 111/64 (05 Jul 2022 13:27) (111/64 - 166/76)  BP(mean): --  RR: 18 (05 Jul 2022 13:27) (17 - 18)  SpO2: 96% (05 Jul 2022 13:27) (91% - 97%)    CBC Full  -  ( 05 Jul 2022 07:45 )  WBC Count : 11.90 K/uL  RBC Count : 3.22 M/uL  Hemoglobin : 9.1 g/dL  Hematocrit : 29.7 %  Platelet Count - Automated : 467 K/uL  Mean Cell Volume : 92.2 fl  Mean Cell Hemoglobin : 28.3 pg  Mean Cell Hemoglobin Concentration : 30.6 gm/dL  Auto Neutrophil # : x  Auto Lymphocyte # : x  Auto Monocyte # : x  Auto Eosinophil # : x  Auto Basophil # : x  Auto Neutrophil % : x  Auto Lymphocyte % : x  Auto Monocyte % : x  Auto Eosinophil % : x  Auto Basophil % : x      Allergies    latex (Unknown)  No Known Drug Allergies    Intolerances      PHYSICAL EXAM:  Vascular: Right DP & PT dopplerable.  Capillary refill (CFT) 3 seconds. Digital hair absent bilaterally.   Neurological: Light touch sensation diminished bilaterally.  Musculoskeletal: s/p right hallux amputation. Strength in all quadrants bilaterally, AJ & STJ ROM absent b/l.   Dermatological: sutures to right hallux intact. No signs of wound dehiscence. No signs of infection.       Culture - Tissue with Gram Stain (collected 23 Jun 2022 15:35)  Source: .Tissue Other, right hallux bone culture  Gram Stain (24 Jun 2022 04:37):    Rare polymorphonuclear leukocytes seen per low power field    Few Gram positive cocci in pairs seen per oil power field        ACCESSION No:  [id number]  Patient:   SHILO KOHLER      Accession:                             [id number]    Collected Date/Time:                   6/23/2022 15:35 EDT  Received Date/Time:                    6/24/2022 07:44 EDT    Surgical Pathology Report - Auth (Verified)    Specimen(s) Submitted  1  Right hallux pathology  2  Right first metatarsal/clean margin pathology    Final Diagnosis  1. Right hallux  -Acute and chronic osteomyelitis.  -Gangrenous skin and soft tissue.    2. Right first metatarsal/clean margin:  -Minimal chronic osteomyelitis.    Verified by: Randall Rodriguez MD  (Electronic Signature)  Reported on: 06/27/22 12:18 EDT, [facility], [address] [place] [address]

## 2022-07-05 NOTE — PHYSICAL THERAPY INITIAL EVALUATION ADULT - ACTIVE RANGE OF MOTION EXAMINATION, REHAB EVAL
bilateral upper extremity Active ROM was WFL (within functional limits)/bilateral  lower extremity Active ROM was WFL (within functional limits)
PROM WFL (B) UE/LE

## 2022-07-05 NOTE — PROGRESS NOTE ADULT - SUBJECTIVE AND OBJECTIVE BOX
Patient is a 71y old  Female who presents with a chief complaint of SIRS (05 Jul 2022 09:16)      INTERVAL HPI/OVERNIGHT EVENTS: Pt was seen and examined at bedside. Overnight event involving nurse needle stick, HIV/Hep labs ordered. Pt is NAD, difficult to arouse on multiple attempts, uncooperative with answering questions or following commands. Unable to obtain ROS.     MEDICATIONS  (STANDING):  ampicillin/sulbactam  IVPB 3 Gram(s) IV Intermittent every 24 hours  ampicillin/sulbactam  IVPB      aspirin enteric coated 81 milliGRAM(s) Oral daily  atorvastatin 40 milliGRAM(s) Oral at bedtime  chlorhexidine 4% Liquid 1 Application(s) Topical <User Schedule>  cloNIDine 0.1 milliGRAM(s) Oral two times a day  clopidogrel Tablet 75 milliGRAM(s) Oral daily  dextrose 5%. 1000 milliLiter(s) (50 mL/Hr) IV Continuous <Continuous>  dextrose 50% Injectable 25 Gram(s) IV Push once  epoetin fawad-epbx (RETACRIT) Injectable 46764 Unit(s) IV Push <User Schedule>  glucagon  Injectable 1 milliGRAM(s) IntraMuscular once  heparin   Injectable 5000 Unit(s) SubCutaneous every 12 hours  imipramine 50 milliGRAM(s) Oral daily  insulin glargine Injectable (LANTUS) 8 Unit(s) SubCutaneous at bedtime  insulin lispro (ADMELOG) corrective regimen sliding scale   SubCutaneous at bedtime  insulin lispro (ADMELOG) corrective regimen sliding scale   SubCutaneous three times a day before meals  metoprolol tartrate 100 milliGRAM(s) Oral every 12 hours  Nephro-elvie 1 Tablet(s) Oral daily  pantoprazole    Tablet 40 milliGRAM(s) Oral before breakfast  risperiDONE   Tablet 0.5 milliGRAM(s) Oral two times a day  zinc sulfate 220 milliGRAM(s) Oral daily    MEDICATIONS  (PRN):  acetaminophen     Tablet .. 650 milliGRAM(s) Oral every 6 hours PRN Temp greater or equal to 38C (100.4F), Mild Pain (1 - 3)  aluminum hydroxide/magnesium hydroxide/simethicone Suspension 30 milliLiter(s) Oral every 4 hours PRN Dyspepsia  dextrose Oral Gel 15 Gram(s) Oral once PRN Blood Glucose LESS THAN 70 milliGRAM(s)/deciliter  melatonin 3 milliGRAM(s) Oral at bedtime PRN Insomnia  OLANZapine Injectable 2.5 milliGRAM(s) IntraMuscular every 6 hours PRN Acute agitation      Allergies    latex (Unknown)  No Known Drug Allergies    Intolerances        REVIEW OF SYSTEMS: ROS not obtained. Pt difficult to arouse, not cooperative with questions or commands     Vital Signs Last 24 Hrs  T(C): 36.2 (05 Jul 2022 07:34), Max: 36.9 (04 Jul 2022 19:53)  T(F): 97.2 (05 Jul 2022 07:34), Max: 98.4 (04 Jul 2022 19:53)  HR: 66 (05 Jul 2022 07:34) (66 - 95)  BP: 147/68 (05 Jul 2022 07:34) (122/74 - 190/80)  BP(mean): --  RR: 18 (05 Jul 2022 07:34) (17 - 18)  SpO2: 92% (05 Jul 2022 07:34) (91% - 97%)    PHYSICAL EXAM:  GENERAL: NAD  HEENT:  anicteric, moist mucous membranes  CHEST/LUNG:  CTA b/l, no rales, wheezes, or rhonchi  HEART:  RRR, S1, S2  ABDOMEN:  BS+, soft, nontender, nondistended  EXTREMITIES: no edema, cyanosis, or calf tenderness  NERVOUS SYSTEM:  Pt difficult to arouse, not cooperative with questions or commands     LABS:                        9.1    11.90 )-----------( 467      ( 05 Jul 2022 07:45 )             29.7     CBC Full  -  ( 05 Jul 2022 07:45 )  WBC Count : 11.90 K/uL  Hemoglobin : 9.1 g/dL  Hematocrit : 29.7 %  Platelet Count - Automated : 467 K/uL  Mean Cell Volume : 92.2 fl  Mean Cell Hemoglobin : 28.3 pg  Mean Cell Hemoglobin Concentration : 30.6 gm/dL  Auto Neutrophil # : x  Auto Lymphocyte # : x  Auto Monocyte # : x  Auto Eosinophil # : x  Auto Basophil # : x  Auto Neutrophil % : x  Auto Lymphocyte % : x  Auto Monocyte % : x  Auto Eosinophil % : x  Auto Basophil % : x    05 Jul 2022 07:45    139    |  100    |  24     ----------------------------<  74     3.9     |  32     |  3.80     Ca    9.2        05 Jul 2022 07:45  Phos  4.0       04 Jul 2022 12:05  Mg     2.7       04 Jul 2022 12:05          POCT Blood Glucose.: 81 mg/dL (05 Jul 2022 08:26)  POCT Blood Glucose.: 156 mg/dL (04 Jul 2022 21:15)  POCT Blood Glucose.: 112 mg/dL (04 Jul 2022 17:07)  POCT Blood Glucose.: 104 mg/dL (04 Jul 2022 13:44)        Culture - Blood (collected 06-28-22 @ 23:58)  Source: .Blood Blood-Peripheral  Final Report (07-04-22 @ 04:00):    No Growth Final    Culture - Blood (collected 06-28-22 @ 23:53)  Source: .Blood Blood-Peripheral  Final Report (07-04-22 @ 04:00):    No Growth Final        RADIOLOGY & ADDITIONAL TESTS:    Personally reviewed.     Consultant(s) Notes Reviewed:  [x] YES  [ ] NO

## 2022-07-05 NOTE — PHYSICAL THERAPY INITIAL EVALUATION ADULT - GENERAL OBSERVATIONS, REHAB EVAL
Received supine in bed with + heplock IV, RA. Patient in no apparent distress.
Received supine in bed with + heplock IV, RA, post-op gauze dressing to (R) foot. Patient in no apparent distress.

## 2022-07-05 NOTE — PHYSICAL THERAPY INITIAL EVALUATION ADULT - CRITERIA FOR SKILLED THERAPEUTIC INTERVENTIONS
impairments found/rehab potential/therapy frequency/anticipated equipment needs at discharge/anticipated discharge recommendation
impairments found/rehab potential/therapy frequency/anticipated equipment needs at discharge/anticipated discharge recommendation

## 2022-07-05 NOTE — PROGRESS NOTE ADULT - SUBJECTIVE AND OBJECTIVE BOX
Patient is a 71y Female with a known history of :  SIRS (systemic inflammatory response syndrome) [R65.10]    Hypoglycemia [E16.2]    Pleural effusion [J90]    CHF, acute [I50.9]    Chronic CHF [I50.9]    Elevated troponin [R77.8]    Atrial fibrillation [I48.91]    Benign essential HTN [I10]    HLD (hyperlipidemia) [E78.5]    Depression, major [F32.9]    Need for prophylactic measure [Z29.9]    ESRD on hemodialysis [N18.6]    T2DM (type 2 diabetes mellitus) [E11.9]    HFrEF (heart failure with reduced ejection fraction) [I50.20]    Gangrene of toe of right foot [I96]    Atherosclerotic PVD with ulceration [I70.209]    PVD (peripheral vascular disease) [I73.9]      HPI:  Patient is a 71 year old female with PMH of A.fib rate controlled not on AC for hx of multiple falls, T2DM, HTN, HLD, ESRD on HD, CHF, s/p CABG brought in by EMS for generalized weakness at home. Patient states that she was doing well when in the afternoon she tried to get up from her couch and felt weak in the LE and fell back in her couch. Denies any hx of head trauma or loss of consciousness. Denies any weakness or numbness. Denies any chest pain, SOB or palpitations. No fever, cough or chills. No hx of recent travel or sick contacts. At the time of EMS arrival patient was found to have POCBS of 50. EMS gave dextrose and on arrival to the ED patient blood sugar was found to be in 30's with hypothermia of 94.9.    ED course:   Vitals:   BP: 176/85  HR:76  Temp:94.2  RR:18, O2:96% on RA   Significant Labs:   CBC: WBC:16.82 Hb:13.2 , Platlets: 260, Neutro:89   CMP: Lytes: WNL, BUN/Creatinine:48/4.8, Glucose:134 , Alkaline Phos:128, AST, 41, eGFR: 9, HsTrop: 275.9, ProBNP: 734118, Lactate: 2  UA; negative  CXR: Heart and lung appear normal (self read)  CT BRAIN: No acute intracranial bleeding. Central volume loss, chronic microvascular ischemic changes, and chronic bilateral lacunar infarctions.  CT CERVICAL SPINE: No fracture. Grade 1 C4-C5 anterolisthesis. C6-C7 disc degeneration. Bilateral pleural effusions and interlobular septal thickening due to   interstitial edema.  EKG: NSR, left axis deviation, HR 71,   QT/QTc: 426/462  Patient received: Ceftriaxone 1 g x1, Dextrose 5% + NS 1L x1, Dextrose 50% IV X1, heating blanket, 2L NC   (16 Jun 2022 01:19)      REVIEW OF SYSTEMS:    CONSTITUTIONAL: No fever, weight loss, or fatigue  EYES: No eye pain, visual disturbances, or discharge  ENMT:  No difficulty hearing, tinnitus, vertigo; No sinus or throat pain  NECK: No pain or stiffness  BREASTS: No pain, masses, or nipple discharge  RESPIRATORY: No cough, wheezing, chills or hemoptysis; No shortness of breath  CARDIOVASCULAR: No chest pain, palpitations, dizziness, or leg swelling  GASTROINTESTINAL: No abdominal or epigastric pain. No nausea, vomiting, or hematemesis; No diarrhea or constipation. No melena or hematochezia.  GENITOURINARY: No dysuria, frequency, hematuria, or incontinence  NEUROLOGICAL: No headaches, memory loss, loss of strength, numbness, or tremors  SKIN: No itching, burning, rashes, or lesions   LYMPH NODES: No enlarged glands  ENDOCRINE: No heat or cold intolerance; No hair loss  MUSCULOSKELETAL: No joint pain or swelling; No muscle, back, or extremity pain  PSYCHIATRIC: No depression, anxiety, mood swings, or difficulty sleeping  HEME/LYMPH: No easy bruising, or bleeding gums  ALLERGY AND IMMUNOLOGIC: No hives or eczema    MEDICATIONS  (STANDING):  ampicillin/sulbactam  IVPB 3 Gram(s) IV Intermittent every 24 hours  ampicillin/sulbactam  IVPB      aspirin enteric coated 81 milliGRAM(s) Oral daily  atorvastatin 40 milliGRAM(s) Oral at bedtime  chlorhexidine 4% Liquid 1 Application(s) Topical <User Schedule>  cloNIDine 0.1 milliGRAM(s) Oral two times a day  clopidogrel Tablet 75 milliGRAM(s) Oral daily  dextrose 5%. 1000 milliLiter(s) (50 mL/Hr) IV Continuous <Continuous>  dextrose 50% Injectable 25 Gram(s) IV Push once  epoetin fawad-epbx (RETACRIT) Injectable 18374 Unit(s) IV Push <User Schedule>  glucagon  Injectable 1 milliGRAM(s) IntraMuscular once  heparin   Injectable 5000 Unit(s) SubCutaneous every 12 hours  imipramine 50 milliGRAM(s) Oral daily  insulin glargine Injectable (LANTUS) 8 Unit(s) SubCutaneous at bedtime  insulin lispro (ADMELOG) corrective regimen sliding scale   SubCutaneous at bedtime  insulin lispro (ADMELOG) corrective regimen sliding scale   SubCutaneous three times a day before meals  metoprolol tartrate 100 milliGRAM(s) Oral every 12 hours  pantoprazole    Tablet 40 milliGRAM(s) Oral before breakfast  risperiDONE   Tablet 0.5 milliGRAM(s) Oral two times a day    MEDICATIONS  (PRN):  acetaminophen     Tablet .. 650 milliGRAM(s) Oral every 6 hours PRN Temp greater or equal to 38C (100.4F), Mild Pain (1 - 3)  aluminum hydroxide/magnesium hydroxide/simethicone Suspension 30 milliLiter(s) Oral every 4 hours PRN Dyspepsia  dextrose Oral Gel 15 Gram(s) Oral once PRN Blood Glucose LESS THAN 70 milliGRAM(s)/deciliter  melatonin 3 milliGRAM(s) Oral at bedtime PRN Insomnia  OLANZapine Injectable 2.5 milliGRAM(s) IntraMuscular every 6 hours PRN Acute agitation      ALLERGIES: latex (Unknown)  No Known Drug Allergies      FAMILY HISTORY:  No pertinent family history in first degree relatives        PHYSICAL EXAMINATION:  -----------------------------  T(C): 36.2 (07-05-22 @ 07:34), Max: 36.9 (07-04-22 @ 19:53)  HR: 66 (07-05-22 @ 07:34) (66 - 95)  BP: 147/68 (07-05-22 @ 07:34) (122/74 - 190/80)  RR: 18 (07-05-22 @ 07:34) (17 - 18)  SpO2: 92% (07-05-22 @ 07:34) (91% - 98%)  Wt(kg): --    07-04 @ 07:01  -  07-05 @ 07:00  --------------------------------------------------------  IN:    IV PiggyBack: 100 mL  Total IN: 100 mL    OUT:    Other (mL): 500 mL  Total OUT: 500 mL    Total NET: -400 mL            VITALS  T(C): 36.2 (07-05-22 @ 07:34), Max: 36.9 (07-04-22 @ 19:53)  HR: 66 (07-05-22 @ 07:34) (66 - 95)  BP: 147/68 (07-05-22 @ 07:34) (122/74 - 190/80)  RR: 18 (07-05-22 @ 07:34) (17 - 18)  SpO2: 92% (07-05-22 @ 07:34) (91% - 98%)    Constitutional: well developed, normal appearance, well groomed, well nourished, no deformities and no acute distress.   Eyes: the conjunctiva exhibited no abnormalities and the eyelids demonstrated no xanthelasmas.   HEENT: normal oral mucosa, no oral pallor and no oral cyanosis.   Neck: normal jugular venous A waves present, normal jugular venous V waves present and no jugular venous wilson A waves.   Pulmonary: no respiratory distress, normal respiratory rhythm and effort, no accessory muscle use and lungs were clear to auscultation bilaterally.   Cardiovascular: heart rate and rhythm were normal, normal S1 and S2 and no murmur, gallop, rub, heave or thrill are present.   Abdomen: soft, non-tender, no hepato-splenomegaly and no abdominal mass palpated.   Musculoskeletal: the gait could not be assessed..   Extremities: no clubbing of the fingernails, no localized cyanosis, no petechial hemorrhages and no ischemic changes.   Skin: normal skin color and pigmentation, no rash, no venous stasis, no skin lesions, no skin ulcer and no xanthoma was observed.   Psychiatric: oriented to person, place, and time, the affect was normal, the mood was normal and not feeling anxious.     LABS:   --------  07-05    139  |  100  |  24<H>  ----------------------------<  74  3.9   |  32<H>  |  3.80<H>    Ca    9.2      05 Jul 2022 07:45  Phos  4.0     07-04  Mg     2.7     07-04                           9.1    11.90 )-----------( 467      ( 05 Jul 2022 07:45 )             29.7                 RADIOLOGY:  -----------------    ECG:     ECHO:

## 2022-07-06 LAB
ANION GAP SERPL CALC-SCNC: 11 MMOL/L — SIGNIFICANT CHANGE UP (ref 5–17)
BUN SERPL-MCNC: 39 MG/DL — HIGH (ref 7–23)
CALCIUM SERPL-MCNC: 9.4 MG/DL — SIGNIFICANT CHANGE UP (ref 8.5–10.1)
CHLORIDE SERPL-SCNC: 98 MMOL/L — SIGNIFICANT CHANGE UP (ref 96–108)
CO2 SERPL-SCNC: 29 MMOL/L — SIGNIFICANT CHANGE UP (ref 22–31)
CREAT SERPL-MCNC: 5.2 MG/DL — HIGH (ref 0.5–1.3)
EGFR: 8 ML/MIN/1.73M2 — LOW
GLUCOSE SERPL-MCNC: 255 MG/DL — HIGH (ref 70–99)
HCT VFR BLD CALC: 29.1 % — LOW (ref 34.5–45)
HGB BLD-MCNC: 9.1 G/DL — LOW (ref 11.5–15.5)
MCHC RBC-ENTMCNC: 29 PG — SIGNIFICANT CHANGE UP (ref 27–34)
MCHC RBC-ENTMCNC: 31.3 GM/DL — LOW (ref 32–36)
MCV RBC AUTO: 92.7 FL — SIGNIFICANT CHANGE UP (ref 80–100)
NRBC # BLD: 0 /100 WBCS — SIGNIFICANT CHANGE UP (ref 0–0)
PLATELET # BLD AUTO: 505 K/UL — HIGH (ref 150–400)
POTASSIUM SERPL-MCNC: 4.2 MMOL/L — SIGNIFICANT CHANGE UP (ref 3.5–5.3)
POTASSIUM SERPL-SCNC: 4.2 MMOL/L — SIGNIFICANT CHANGE UP (ref 3.5–5.3)
RBC # BLD: 3.14 M/UL — LOW (ref 3.8–5.2)
RBC # FLD: 18.9 % — HIGH (ref 10.3–14.5)
SARS-COV-2 RNA SPEC QL NAA+PROBE: SIGNIFICANT CHANGE UP
SODIUM SERPL-SCNC: 138 MMOL/L — SIGNIFICANT CHANGE UP (ref 135–145)
WBC # BLD: 12.78 K/UL — HIGH (ref 3.8–10.5)
WBC # FLD AUTO: 12.78 K/UL — HIGH (ref 3.8–10.5)

## 2022-07-06 PROCEDURE — 99232 SBSQ HOSP IP/OBS MODERATE 35: CPT | Mod: GC

## 2022-07-06 RX ORDER — INSULIN GLARGINE 100 [IU]/ML
12 INJECTION, SOLUTION SUBCUTANEOUS AT BEDTIME
Refills: 0 | Status: DISCONTINUED | OUTPATIENT
Start: 2022-07-06 | End: 2022-07-07

## 2022-07-06 RX ADMIN — Medication 6: at 17:11

## 2022-07-06 RX ADMIN — PANTOPRAZOLE SODIUM 40 MILLIGRAM(S): 20 TABLET, DELAYED RELEASE ORAL at 05:13

## 2022-07-06 RX ADMIN — Medication 100 MILLIGRAM(S): at 05:12

## 2022-07-06 RX ADMIN — Medication 4: at 13:59

## 2022-07-06 RX ADMIN — CHLORHEXIDINE GLUCONATE 1 APPLICATION(S): 213 SOLUTION TOPICAL at 05:15

## 2022-07-06 RX ADMIN — INSULIN GLARGINE 12 UNIT(S): 100 INJECTION, SOLUTION SUBCUTANEOUS at 21:08

## 2022-07-06 RX ADMIN — Medication 50 MILLIGRAM(S): at 13:40

## 2022-07-06 RX ADMIN — Medication 0.1 MILLIGRAM(S): at 05:13

## 2022-07-06 RX ADMIN — HEPARIN SODIUM 5000 UNIT(S): 5000 INJECTION INTRAVENOUS; SUBCUTANEOUS at 05:12

## 2022-07-06 RX ADMIN — AMPICILLIN SODIUM AND SULBACTAM SODIUM 200 GRAM(S): 250; 125 INJECTION, POWDER, FOR SUSPENSION INTRAMUSCULAR; INTRAVENOUS at 21:08

## 2022-07-06 RX ADMIN — ZINC SULFATE TAB 220 MG (50 MG ZINC EQUIVALENT) 220 MILLIGRAM(S): 220 (50 ZN) TAB at 13:39

## 2022-07-06 RX ADMIN — RISPERIDONE 0.5 MILLIGRAM(S): 4 TABLET ORAL at 05:12

## 2022-07-06 RX ADMIN — ERYTHROPOIETIN 10000 UNIT(S): 10000 INJECTION, SOLUTION INTRAVENOUS; SUBCUTANEOUS at 10:09

## 2022-07-06 RX ADMIN — Medication 1 TABLET(S): at 13:39

## 2022-07-06 RX ADMIN — HEPARIN SODIUM 5000 UNIT(S): 5000 INJECTION INTRAVENOUS; SUBCUTANEOUS at 17:11

## 2022-07-06 RX ADMIN — RISPERIDONE 0.5 MILLIGRAM(S): 4 TABLET ORAL at 17:11

## 2022-07-06 RX ADMIN — Medication 81 MILLIGRAM(S): at 13:39

## 2022-07-06 RX ADMIN — Medication 0.1 MILLIGRAM(S): at 17:11

## 2022-07-06 RX ADMIN — Medication 100 MILLIGRAM(S): at 17:12

## 2022-07-06 RX ADMIN — ATORVASTATIN CALCIUM 40 MILLIGRAM(S): 80 TABLET, FILM COATED ORAL at 21:08

## 2022-07-06 RX ADMIN — CLOPIDOGREL BISULFATE 75 MILLIGRAM(S): 75 TABLET, FILM COATED ORAL at 13:39

## 2022-07-06 RX ADMIN — Medication 6: at 08:24

## 2022-07-06 NOTE — PROGRESS NOTE ADULT - SUBJECTIVE AND OBJECTIVE BOX
Patient is a 71y old  Female who presents with a chief complaint of SIRS (06 Jul 2022 09:01)      INTERVAL HPI/OVERNIGHT EVENTS: Pt was seen and evaluated at bedside. No overnight events. Pt resting comfortable, in NAD, denies fevers chills, ha, dizziness, cp, sob, abd pain, n/v/d or calf pain. No other complaints at this time.    MEDICATIONS  (STANDING):  ampicillin/sulbactam  IVPB 3 Gram(s) IV Intermittent every 24 hours  ampicillin/sulbactam  IVPB      aspirin enteric coated 81 milliGRAM(s) Oral daily  atorvastatin 40 milliGRAM(s) Oral at bedtime  chlorhexidine 4% Liquid 1 Application(s) Topical <User Schedule>  cloNIDine 0.1 milliGRAM(s) Oral two times a day  clopidogrel Tablet 75 milliGRAM(s) Oral daily  dextrose 5%. 1000 milliLiter(s) (50 mL/Hr) IV Continuous <Continuous>  dextrose 50% Injectable 25 Gram(s) IV Push once  epoetin fawad-epbx (RETACRIT) Injectable 99992 Unit(s) IV Push <User Schedule>  glucagon  Injectable 1 milliGRAM(s) IntraMuscular once  heparin   Injectable 5000 Unit(s) SubCutaneous every 12 hours  imipramine 50 milliGRAM(s) Oral daily  insulin glargine Injectable (LANTUS) 8 Unit(s) SubCutaneous at bedtime  insulin lispro (ADMELOG) corrective regimen sliding scale   SubCutaneous three times a day before meals  insulin lispro (ADMELOG) corrective regimen sliding scale   SubCutaneous at bedtime  metoprolol tartrate 100 milliGRAM(s) Oral every 12 hours  Nephro-elvie 1 Tablet(s) Oral daily  pantoprazole    Tablet 40 milliGRAM(s) Oral before breakfast  risperiDONE   Tablet 0.5 milliGRAM(s) Oral two times a day  zinc sulfate 220 milliGRAM(s) Oral daily    MEDICATIONS  (PRN):  acetaminophen     Tablet .. 650 milliGRAM(s) Oral every 6 hours PRN Temp greater or equal to 38C (100.4F), Mild Pain (1 - 3)  aluminum hydroxide/magnesium hydroxide/simethicone Suspension 30 milliLiter(s) Oral every 4 hours PRN Dyspepsia  dextrose Oral Gel 15 Gram(s) Oral once PRN Blood Glucose LESS THAN 70 milliGRAM(s)/deciliter  melatonin 3 milliGRAM(s) Oral at bedtime PRN Insomnia  OLANZapine Injectable 2.5 milliGRAM(s) IntraMuscular every 6 hours PRN Acute agitation      Allergies    latex (Unknown)  No Known Drug Allergies    Intolerances        REVIEW OF SYSTEMS:  CONSTITUTIONAL: No fever or chills  HEENT:  No headache, no sore throat  RESPIRATORY: No cough, wheezing, or shortness of breath  CARDIOVASCULAR: No chest pain, palpitations  GASTROINTESTINAL: No abd pain, nausea, vomiting, or diarrhea  GENITOURINARY: No dysuria, frequency, or hematuria  NEUROLOGICAL: no focal weakness or dizziness  MUSCULOSKELETAL: no myalgias     Vital Signs Last 24 Hrs  T(C): 36.8 (06 Jul 2022 09:40), Max: 36.8 (06 Jul 2022 09:40)  T(F): 98.2 (06 Jul 2022 09:40), Max: 98.2 (06 Jul 2022 09:40)  HR: 84 (06 Jul 2022 09:45) (65 - 89)  BP: 161/70 (06 Jul 2022 09:45) (111/64 - 161/70)  BP(mean): --  RR: 18 (06 Jul 2022 09:40) (15 - 18)  SpO2: 95% (06 Jul 2022 09:40) (92% - 96%)    PHYSICAL EXAM:  GENERAL: NAD  HEENT:  anicteric, moist mucous membranes  CHEST/LUNG:  CTA b/l, no rales, wheezes, or rhonchi  HEART:  RRR, S1, S2  ABDOMEN:  BS+, soft, nontender, nondistended  EXTREMITIES: no edema, cyanosis, or calf tenderness  NERVOUS SYSTEM: answers questions and follows commands appropriately    LABS:                        9.1    12.78 )-----------( 505      ( 06 Jul 2022 06:10 )             29.1     CBC Full  -  ( 06 Jul 2022 06:10 )  WBC Count : 12.78 K/uL  Hemoglobin : 9.1 g/dL  Hematocrit : 29.1 %  Platelet Count - Automated : 505 K/uL  Mean Cell Volume : 92.7 fl  Mean Cell Hemoglobin : 29.0 pg  Mean Cell Hemoglobin Concentration : 31.3 gm/dL  Auto Neutrophil # : x  Auto Lymphocyte # : x  Auto Monocyte # : x  Auto Eosinophil # : x  Auto Basophil # : x  Auto Neutrophil % : x  Auto Lymphocyte % : x  Auto Monocyte % : x  Auto Eosinophil % : x  Auto Basophil % : x    06 Jul 2022 06:10    138    |  98     |  39     ----------------------------<  255    4.2     |  29     |  5.20     Ca    9.4        06 Jul 2022 06:10          CAPILLARY BLOOD GLUCOSE      POCT Blood Glucose.: 160 mg/dL (06 Jul 2022 12:24)  POCT Blood Glucose.: 280 mg/dL (06 Jul 2022 08:14)  POCT Blood Glucose.: 237 mg/dL (05 Jul 2022 21:33)          RADIOLOGY & ADDITIONAL TESTS:    Personally reviewed.     Consultant(s) Notes Reviewed:  [x] YES  [ ] NO

## 2022-07-06 NOTE — PROGRESS NOTE ADULT - PROBLEM SELECTOR PLAN 1
increase lantus 12 units qhs  cont mod dose admelog corrective scale coverage  cont cons cho diet  goal bg less than 180mg/dl

## 2022-07-06 NOTE — PROGRESS NOTE ADULT - SUBJECTIVE AND OBJECTIVE BOX
72 y/o female seen at bedside. S/p R hallux amputation. Pt doing well, AAOx3. Pt resting in bed comfortably. Dressing to the right foot clean, dry, and intact.     MEDICATIONS  (STANDING):  ampicillin/sulbactam  IVPB 3 Gram(s) IV Intermittent every 24 hours  ampicillin/sulbactam  IVPB      aspirin enteric coated 81 milliGRAM(s) Oral daily  atorvastatin 40 milliGRAM(s) Oral at bedtime  chlorhexidine 4% Liquid 1 Application(s) Topical <User Schedule>  cloNIDine 0.1 milliGRAM(s) Oral two times a day  clopidogrel Tablet 75 milliGRAM(s) Oral daily  dextrose 5%. 1000 milliLiter(s) (50 mL/Hr) IV Continuous <Continuous>  dextrose 50% Injectable 25 Gram(s) IV Push once  epoetin fawad-epbx (RETACRIT) Injectable 10188 Unit(s) IV Push <User Schedule>  glucagon  Injectable 1 milliGRAM(s) IntraMuscular once  heparin   Injectable 5000 Unit(s) SubCutaneous every 12 hours  imipramine 50 milliGRAM(s) Oral daily  insulin glargine Injectable (LANTUS) 8 Unit(s) SubCutaneous at bedtime  insulin lispro (ADMELOG) corrective regimen sliding scale   SubCutaneous three times a day before meals  insulin lispro (ADMELOG) corrective regimen sliding scale   SubCutaneous at bedtime  metoprolol tartrate 100 milliGRAM(s) Oral every 12 hours  Nephro-elvie 1 Tablet(s) Oral daily  pantoprazole    Tablet 40 milliGRAM(s) Oral before breakfast  risperiDONE   Tablet 0.5 milliGRAM(s) Oral two times a day  zinc sulfate 220 milliGRAM(s) Oral daily    MEDICATIONS  (PRN):  acetaminophen     Tablet .. 650 milliGRAM(s) Oral every 6 hours PRN Temp greater or equal to 38C (100.4F), Mild Pain (1 - 3)  aluminum hydroxide/magnesium hydroxide/simethicone Suspension 30 milliLiter(s) Oral every 4 hours PRN Dyspepsia  dextrose Oral Gel 15 Gram(s) Oral once PRN Blood Glucose LESS THAN 70 milliGRAM(s)/deciliter  melatonin 3 milliGRAM(s) Oral at bedtime PRN Insomnia  OLANZapine Injectable 2.5 milliGRAM(s) IntraMuscular every 6 hours PRN Acute agitation    Vital Signs Last 24 Hrs  T(C): 37 (06 Jul 2022 13:35), Max: 37 (06 Jul 2022 13:35)  T(F): 98.6 (06 Jul 2022 13:35), Max: 98.6 (06 Jul 2022 13:35)  HR: 82 (06 Jul 2022 13:35) (76 - 89)  BP: 135/66 (06 Jul 2022 13:35) (93/53 - 161/70)  BP(mean): --  RR: 18 (06 Jul 2022 13:35) (15 - 18)  SpO2: 98% (06 Jul 2022 13:35) (92% - 98%)    CBC Full  -  ( 06 Jul 2022 06:10 )  WBC Count : 12.78 K/uL  RBC Count : 3.14 M/uL  Hemoglobin : 9.1 g/dL  Hematocrit : 29.1 %  Platelet Count - Automated : 505 K/uL  Mean Cell Volume : 92.7 fl  Mean Cell Hemoglobin : 29.0 pg  Mean Cell Hemoglobin Concentration : 31.3 gm/dL  Auto Neutrophil # : x  Auto Lymphocyte # : x  Auto Monocyte # : x  Auto Eosinophil # : x  Auto Basophil # : x  Auto Neutrophil % : x  Auto Lymphocyte % : x  Auto Monocyte % : x  Auto Eosinophil % : x  Auto Basophil % : x      Allergies    latex (Unknown)  No Known Drug Allergies    Intolerances      PHYSICAL EXAM:  Vascular: Right DP & PT dopplerable.  Capillary refill (CFT) 3 seconds. Digital hair absent bilaterally.   Neurological: Light touch sensation diminished bilaterally.  Musculoskeletal: s/p right hallux amputation. Strength in all quadrants bilaterally, AJ & STJ ROM absent b/l.   Dermatological: sutures to right hallux intact. No signs of wound dehiscence. No signs of infection.       Culture - Tissue with Gram Stain (collected 23 Jun 2022 15:35)  Source: .Tissue Other, right hallux bone culture  Gram Stain (24 Jun 2022 04:37):    Rare polymorphonuclear leukocytes seen per low power field    Few Gram positive cocci in pairs seen per oil power field        ACCESSION No:  30 [id number]  Patient:   SHILO KOHLER      Accession:                             [id number]- [id number]    Collected Date/Time:                   6/23/2022 15:35 EDT  Received Date/Time:                    6/24/2022 07:44 EDT    Surgical Pathology Report - Auth (Verified)    Specimen(s) Submitted  1  Right hallux pathology  2  Right first metatarsal/clean margin pathology    Final Diagnosis  1. Right hallux  -Acute and chronic osteomyelitis.  -Gangrenous skin and soft tissue.    2. Right first metatarsal/clean margin:  -Minimal chronic osteomyelitis.    Verified by: Randall Rodriguez MD  (Electronic Signature)  Reported on: 06/27/22 12:18 EDT, [facility], [address] [place] [address]

## 2022-07-06 NOTE — PROGRESS NOTE ADULT - SUBJECTIVE AND OBJECTIVE BOX
Patient is a 71y Female whom presented to the hospital with esrd on hd     PAST MEDICAL & SURGICAL HISTORY:  Diabetes mellitus II      HTN (hypertension)      h/o Anxiety attack      Depression      h/o Myocardial infarct 2007      CAD (coronary artery disease)      h/o Hepatitis A 1969  currently resolved, no symptoms      PAD (peripheral artery disease)      Murmur, cardiac      h/o Smoking  quitted 3/2012      CRF (chronic renal failure), unspecified stage      Dialysis patient      Anemia secondary to renal failure      HTN (hypertension)      coronary stent 2007      s/p Ovarian cyst removal      s/p surgical removal of benign Skin lesion epigastric area          MEDICATIONS  (STANDING):  amLODIPine   Tablet 5 milliGRAM(s) Oral daily  aspirin enteric coated 81 milliGRAM(s) Oral daily  atorvastatin 40 milliGRAM(s) Oral at bedtime  calcium acetate 667 milliGRAM(s) Oral three times a day with meals  cefTRIAXone   IVPB 1000 milliGRAM(s) IV Intermittent every 24 hours  cloNIDine 0.1 milliGRAM(s) Oral two times a day  clopidogrel Tablet 75 milliGRAM(s) Oral daily  dextrose 5%. 1000 milliLiter(s) (100 mL/Hr) IV Continuous <Continuous>  dextrose 5%. 1000 milliLiter(s) (50 mL/Hr) IV Continuous <Continuous>  dextrose 50% Injectable 25 Gram(s) IV Push once  dextrose 50% Injectable 12.5 Gram(s) IV Push once  dextrose 50% Injectable 25 Gram(s) IV Push once  diltiazem    Tablet 60 milliGRAM(s) Oral every 8 hours  glucagon  Injectable 1 milliGRAM(s) IntraMuscular once  heparin   Injectable 5000 Unit(s) SubCutaneous every 12 hours  imipramine 50 milliGRAM(s) Oral daily  insulin glargine Injectable (LANTUS) 12 Unit(s) SubCutaneous at bedtime  insulin lispro (ADMELOG) corrective regimen sliding scale   SubCutaneous three times a day before meals  insulin lispro (ADMELOG) corrective regimen sliding scale   SubCutaneous at bedtime  metoprolol tartrate 100 milliGRAM(s) Oral every 12 hours  pantoprazole    Tablet 40 milliGRAM(s) Oral before breakfast  povidone iodine 10% Solution 1 Application(s) Topical daily      Allergies    latex (Unknown)  No Known Drug Allergies    Intolerances        SOCIAL HISTORY:  Denies ETOh,Smoking,     FAMILY HISTORY:  No pertinent family history in first degree relatives        REVIEW OF SYSTEMS:    CONSTITUTIONAL: No weakness, fevers or chills  RESPIRATORY: No cough, wheezing, hemoptysis; No shortness of breath  CARDIOVASCULAR: No chest pain or palpitations                                                                                                                   9.1    12.78 )-----------( 505      ( 06 Jul 2022 06:10 )             29.1       CBC Full  -  ( 06 Jul 2022 06:10 )  WBC Count : 12.78 K/uL  RBC Count : 3.14 M/uL  Hemoglobin : 9.1 g/dL  Hematocrit : 29.1 %  Platelet Count - Automated : 505 K/uL  Mean Cell Volume : 92.7 fl  Mean Cell Hemoglobin : 29.0 pg  Mean Cell Hemoglobin Concentration : 31.3 gm/dL  Auto Neutrophil # : x  Auto Lymphocyte # : x  Auto Monocyte # : x  Auto Eosinophil # : x  Auto Basophil # : x  Auto Neutrophil % : x  Auto Lymphocyte % : x  Auto Monocyte % : x  Auto Eosinophil % : x  Auto Basophil % : x      07-06    138  |  98  |  39<H>  ----------------------------<  255<H>  4.2   |  29  |  5.20<H>    Ca    9.4      06 Jul 2022 06:10        CAPILLARY BLOOD GLUCOSE      POCT Blood Glucose.: 287 mg/dL (06 Jul 2022 17:04)  POCT Blood Glucose.: 237 mg/dL (06 Jul 2022 13:56)  POCT Blood Glucose.: 160 mg/dL (06 Jul 2022 12:24)  POCT Blood Glucose.: 280 mg/dL (06 Jul 2022 08:14)  POCT Blood Glucose.: 237 mg/dL (05 Jul 2022 21:33)      Vital Signs Last 24 Hrs  T(C): 37 (06 Jul 2022 13:35), Max: 37 (06 Jul 2022 13:35)  T(F): 98.6 (06 Jul 2022 13:35), Max: 98.6 (06 Jul 2022 13:35)  HR: 90 (06 Jul 2022 17:21) (76 - 90)  BP: 149/65 (06 Jul 2022 17:21) (93/53 - 161/70)  BP(mean): --  RR: 18 (06 Jul 2022 13:35) (15 - 18)  SpO2: 98% (06 Jul 2022 13:35) (92% - 98%)                                                                                                                                 PHYSICAL EXAM:    Constitutional: NAD  HEENT: conjunctive   clear   Neck:  No JVD  Respiratory: CTAB  Cardiovascular: S1 and S2  Gastrointestinal: BS+, soft, NT/ND  Skin: dry

## 2022-07-06 NOTE — PROGRESS NOTE ADULT - SUBJECTIVE AND OBJECTIVE BOX
CAPILLARY BLOOD GLUCOSE      POCT Blood Glucose.: 237 mg/dL (06 Jul 2022 13:56)  POCT Blood Glucose.: 160 mg/dL (06 Jul 2022 12:24)  POCT Blood Glucose.: 280 mg/dL (06 Jul 2022 08:14)  POCT Blood Glucose.: 237 mg/dL (05 Jul 2022 21:33)      Vital Signs Last 24 Hrs  T(C): 37 (06 Jul 2022 13:35), Max: 37 (06 Jul 2022 13:35)  T(F): 98.6 (06 Jul 2022 13:35), Max: 98.6 (06 Jul 2022 13:35)  HR: 89 (06 Jul 2022 15:14) (76 - 89)  BP: 135/66 (06 Jul 2022 13:35) (93/53 - 161/70)  BP(mean): --  RR: 18 (06 Jul 2022 13:35) (15 - 18)  SpO2: 98% (06 Jul 2022 13:35) (92% - 98%)    Respiratory: CTA B/L  CV: RRR, S1S2, no murmurs, rubs or gallops  Abdominal: Soft, NT, ND +BS, Last BM  Extremities: No edema, + peripheral pulses     07-06    138  |  98  |  39<H>  ----------------------------<  255<H>  4.2   |  29  |  5.20<H>    Ca    9.4      06 Jul 2022 06:10        atorvastatin 40 milliGRAM(s) Oral at bedtime  dextrose 50% Injectable 25 Gram(s) IV Push once  dextrose Oral Gel 15 Gram(s) Oral once PRN  glucagon  Injectable 1 milliGRAM(s) IntraMuscular once  insulin glargine Injectable (LANTUS) 8 Unit(s) SubCutaneous at bedtime  insulin lispro (ADMELOG) corrective regimen sliding scale   SubCutaneous three times a day before meals  insulin lispro (ADMELOG) corrective regimen sliding scale   SubCutaneous at bedtime

## 2022-07-06 NOTE — PROGRESS NOTE ADULT - SUBJECTIVE AND OBJECTIVE BOX
Neurology follow up note    SHILO [id number]      Interval History:    Patient feels ok no new complaints.    Allergies    latex (Unknown)  No Known Drug Allergies    Intolerances        MEDICATIONS    acetaminophen     Tablet .. 650 milliGRAM(s) Oral every 6 hours PRN  aluminum hydroxide/magnesium hydroxide/simethicone Suspension 30 milliLiter(s) Oral every 4 hours PRN  ampicillin/sulbactam  IVPB 3 Gram(s) IV Intermittent every 24 hours  ampicillin/sulbactam  IVPB      aspirin enteric coated 81 milliGRAM(s) Oral daily  atorvastatin 40 milliGRAM(s) Oral at bedtime  chlorhexidine 4% Liquid 1 Application(s) Topical <User Schedule>  cloNIDine 0.1 milliGRAM(s) Oral two times a day  clopidogrel Tablet 75 milliGRAM(s) Oral daily  dextrose 5%. 1000 milliLiter(s) IV Continuous <Continuous>  dextrose 50% Injectable 25 Gram(s) IV Push once  dextrose Oral Gel 15 Gram(s) Oral once PRN  epoetin fawad-epbx (RETACRIT) Injectable 31662 Unit(s) IV Push <User Schedule>  glucagon  Injectable 1 milliGRAM(s) IntraMuscular once  heparin   Injectable 5000 Unit(s) SubCutaneous every 12 hours  imipramine 50 milliGRAM(s) Oral daily  insulin glargine Injectable (LANTUS) 8 Unit(s) SubCutaneous at bedtime  insulin lispro (ADMELOG) corrective regimen sliding scale   SubCutaneous at bedtime  insulin lispro (ADMELOG) corrective regimen sliding scale   SubCutaneous three times a day before meals  melatonin 3 milliGRAM(s) Oral at bedtime PRN  metoprolol tartrate 100 milliGRAM(s) Oral every 12 hours  Nephro-elvie 1 Tablet(s) Oral daily  OLANZapine Injectable 2.5 milliGRAM(s) IntraMuscular every 6 hours PRN  pantoprazole    Tablet 40 milliGRAM(s) Oral before breakfast  risperiDONE   Tablet 0.5 milliGRAM(s) Oral two times a day  zinc sulfate 220 milliGRAM(s) Oral daily              Vital Signs Last 24 Hrs  T(C): 36.7 (06 Jul 2022 05:12), Max: 36.7 (05 Jul 2022 13:27)  T(F): 98 (06 Jul 2022 05:12), Max: 98.1 (05 Jul 2022 13:27)  HR: 89 (06 Jul 2022 05:12) (65 - 89)  BP: 159/62 (06 Jul 2022 05:12) (111/64 - 159/62)  BP(mean): --  RR: 18 (06 Jul 2022 05:12) (18 - 18)  SpO2: 92% (06 Jul 2022 05:12) (92% - 96%)        REVIEW OF SYSTEMS:  Slightly limited secondary to the patient being altered, but constitutionally, she denies fever, chills, or night sweats.  Head:  No headaches.  Eyes:  No double vision or blurry vision.  Ears:  No ringing in the ears.  Neck:  No neck pain.  Cardiovascular:  No chest pain.  Respiratory:  No shortness of breath.  Abdomen:  No nausea, vomiting, or abdominal pain.  Extremities/Neurological:  No numbness or tingling.  Musculoskeletal:  No joint pain.      PHYSICAL EXAMINATION:   HEENT:  Head:  Normocephalic, atraumatic.  Eyes:  No scleral icterus.  Ears:  Hearing appeared to be intact.  NECK:  Supple.  CARDIOVASCULAR:  S1 and S2 are heard.  RESPIRATORY:  Decreased breath sounds bilaterally, gives poor effort.  ABDOMEN:  Soft, nontender.  EXTREMITIES:  No clubbing or cyanosis was noted.      NEUROLOGIC:  The patient was arousable to verbal stimuli.  Location unknown, year was 2004, was able to name the number of fingers in each eye.  Pupils bilaterally were 2 mm, slightly reactive.  Speech was fluent.  Smile symmetric.  Motor, bilateral upper 4/5, bilateral lower 4-/5.  The patient, per previous note, does have significantly impaired proprioception, bilateral extremities, and knee jerks were trace, suspect this is from underlying diabetes.               LABS:  CBC Full  -  ( 05 Jul 2022 07:45 )  WBC Count : 11.90 K/uL  RBC Count : 3.22 M/uL  Hemoglobin : 9.1 g/dL  Hematocrit : 29.7 %  Platelet Count - Automated : 467 K/uL  Mean Cell Volume : 92.2 fl  Mean Cell Hemoglobin : 28.3 pg  Mean Cell Hemoglobin Concentration : 30.6 gm/dL  Auto Neutrophil # : x  Auto Lymphocyte # : x  Auto Monocyte # : x  Auto Eosinophil # : x  Auto Basophil # : x  Auto Neutrophil % : x  Auto Lymphocyte % : x  Auto Monocyte % : x  Auto Eosinophil % : x  Auto Basophil % : x      07-05    139  |  100  |  24<H>  ----------------------------<  74  3.9   |  32<H>  |  3.80<H>    Ca    9.2      05 Jul 2022 07:45  Phos  4.0     07-04  Mg     2.7     07-04      Hemoglobin A1C:       Vitamin B12         RADIOLOGY      ANALYSIS AND PLAN:  This is a 71-year-old with episode of altered mental status.    For episode of altered mental status, suspect this is multifactorial secondary to underlying metabolic encephalopathy along with mild cognitive impairment and subtle dementia becoming more prominent in the hospital setting.  Examination was limited, but I see no clear focality to suggest a new cerebrovascular accident has ensued.  For history of ataxia and falls, most likely multifactorial secondary to age-related changes and neuropathy.  For history of diabetes, strict control of blood sugars.  For history of hypertension, monitor systolic blood pressure.  For hyperlipidemia, statin.  For mild cognitive impairment versus subtle dementia, for now supportive therapy will have episodes of on and off AMS  no new events     spoke to spouse, Geoffrey, at [phone] 7/1    Greater than 40 minutes of time was spent with the patient, plan of care, reviewing data, with greater than 50% of the visit was spent counseling and/or coordinating care with multidisciplinary healthcare team

## 2022-07-06 NOTE — PROGRESS NOTE ADULT - SUBJECTIVE AND OBJECTIVE BOX
Patient is a 71y Female with a known history of :  SIRS (systemic inflammatory response syndrome) [R65.10]    Hypoglycemia [E16.2]    Pleural effusion [J90]    CHF, acute [I50.9]    Chronic CHF [I50.9]    Elevated troponin [R77.8]    Atrial fibrillation [I48.91]    Benign essential HTN [I10]    HLD (hyperlipidemia) [E78.5]    Depression, major [F32.9]    Need for prophylactic measure [Z29.9]    ESRD on hemodialysis [N18.6]    T2DM (type 2 diabetes mellitus) [E11.9]    HFrEF (heart failure with reduced ejection fraction) [I50.20]    Gangrene of toe of right foot [I96]    Atherosclerotic PVD with ulceration [I70.209]    PVD (peripheral vascular disease) [I73.9]      HPI:  Patient is a 71 year old female with PMH of A.fib rate controlled not on AC for hx of multiple falls, T2DM, HTN, HLD, ESRD on HD, CHF, s/p CABG brought in by EMS for generalized weakness at home. Patient states that she was doing well when in the afternoon she tried to get up from her couch and felt weak in the LE and fell back in her couch. Denies any hx of head trauma or loss of consciousness. Denies any weakness or numbness. Denies any chest pain, SOB or palpitations. No fever, cough or chills. No hx of recent travel or sick contacts. At the time of EMS arrival patient was found to have POCBS of 50. EMS gave dextrose and on arrival to the ED patient blood sugar was found to be in 30's with hypothermia of 94.9.    ED course:   Vitals:   BP: 176/85  HR:76  Temp:94.2  RR:18, O2:96% on RA   Significant Labs:   CBC: WBC:16.82 Hb:13.2 , Platlets: 260, Neutro:89   CMP: Lytes: WNL, BUN/Creatinine:48/4.8, Glucose:134 , Alkaline Phos:128, AST, 41, eGFR: 9, HsTrop: 275.9, ProBNP: [id number], Lactate: 2  UA; negative  CXR: Heart and lung appear normal (self read)  CT BRAIN: No acute intracranial bleeding. Central volume loss, chronic microvascular ischemic changes, and chronic bilateral lacunar infarctions.  CT CERVICAL SPINE: No fracture. Grade 1 C4-C5 anterolisthesis. C6-C7 disc degeneration. Bilateral pleural effusions and interlobular septal thickening due to   interstitial edema.  EKG: NSR, left axis deviation, HR 71,   QT/QTc: 426/462  Patient received: Ceftriaxone 1 g x1, Dextrose 5% + NS 1L x1, Dextrose 50% IV X1, heating blanket, 2L NC   (16 Jun 2022 01:19)      REVIEW OF SYSTEMS:    CONSTITUTIONAL: No fever, weight loss, or fatigue  EYES: No eye pain, visual disturbances, or discharge  ENMT:  No difficulty hearing, tinnitus, vertigo; No sinus or throat pain  NECK: No pain or stiffness  BREASTS: No pain, masses, or nipple discharge  RESPIRATORY: No cough, wheezing, chills or hemoptysis; No shortness of breath  CARDIOVASCULAR: No chest pain, palpitations, dizziness, or leg swelling  GASTROINTESTINAL: No abdominal or epigastric pain. No nausea, vomiting, or hematemesis; No diarrhea or constipation. No melena or hematochezia.  GENITOURINARY: No dysuria, frequency, hematuria, or incontinence  NEUROLOGICAL: No headaches, memory loss, loss of strength, numbness, or tremors  SKIN: No itching, burning, rashes, or lesions   LYMPH NODES: No enlarged glands  ENDOCRINE: No heat or cold intolerance; No hair loss  MUSCULOSKELETAL: No joint pain or swelling; No muscle, back, or extremity pain  PSYCHIATRIC: No depression, anxiety, mood swings, or difficulty sleeping  HEME/LYMPH: No easy bruising, or bleeding gums  ALLERGY AND IMMUNOLOGIC: No hives or eczema    MEDICATIONS  (STANDING):  ampicillin/sulbactam  IVPB 3 Gram(s) IV Intermittent every 24 hours  ampicillin/sulbactam  IVPB      aspirin enteric coated 81 milliGRAM(s) Oral daily  atorvastatin 40 milliGRAM(s) Oral at bedtime  chlorhexidine 4% Liquid 1 Application(s) Topical <User Schedule>  cloNIDine 0.1 milliGRAM(s) Oral two times a day  clopidogrel Tablet 75 milliGRAM(s) Oral daily  dextrose 5%. 1000 milliLiter(s) (50 mL/Hr) IV Continuous <Continuous>  dextrose 50% Injectable 25 Gram(s) IV Push once  epoetin fawad-epbx (RETACRIT) Injectable 80024 Unit(s) IV Push <User Schedule>  glucagon  Injectable 1 milliGRAM(s) IntraMuscular once  heparin   Injectable 5000 Unit(s) SubCutaneous every 12 hours  imipramine 50 milliGRAM(s) Oral daily  insulin glargine Injectable (LANTUS) 8 Unit(s) SubCutaneous at bedtime  insulin lispro (ADMELOG) corrective regimen sliding scale   SubCutaneous three times a day before meals  insulin lispro (ADMELOG) corrective regimen sliding scale   SubCutaneous at bedtime  metoprolol tartrate 100 milliGRAM(s) Oral every 12 hours  Nephro-elvie 1 Tablet(s) Oral daily  pantoprazole    Tablet 40 milliGRAM(s) Oral before breakfast  risperiDONE   Tablet 0.5 milliGRAM(s) Oral two times a day  zinc sulfate 220 milliGRAM(s) Oral daily    MEDICATIONS  (PRN):  acetaminophen     Tablet .. 650 milliGRAM(s) Oral every 6 hours PRN Temp greater or equal to 38C (100.4F), Mild Pain (1 - 3)  aluminum hydroxide/magnesium hydroxide/simethicone Suspension 30 milliLiter(s) Oral every 4 hours PRN Dyspepsia  dextrose Oral Gel 15 Gram(s) Oral once PRN Blood Glucose LESS THAN 70 milliGRAM(s)/deciliter  melatonin 3 milliGRAM(s) Oral at bedtime PRN Insomnia  OLANZapine Injectable 2.5 milliGRAM(s) IntraMuscular every 6 hours PRN Acute agitation      ALLERGIES: latex (Unknown)  No Known Drug Allergies      FAMILY HISTORY:  No pertinent family history in first degree relatives        PHYSICAL EXAMINATION:  -----------------------------  T(C): 36.7 (07-06-22 @ 05:12), Max: 36.7 (07-05-22 @ 13:27)  HR: 89 (07-06-22 @ 05:12) (65 - 89)  BP: 159/62 (07-06-22 @ 05:12) (111/64 - 159/62)  RR: 18 (07-06-22 @ 05:12) (18 - 18)  SpO2: 92% (07-06-22 @ 05:12) (92% - 96%)  Wt(kg): --        VITALS  T(C): 36.7 (07-06-22 @ 05:12), Max: 36.7 (07-05-22 @ 13:27)  HR: 89 (07-06-22 @ 05:12) (65 - 89)  BP: 159/62 (07-06-22 @ 05:12) (111/64 - 159/62)  RR: 18 (07-06-22 @ 05:12) (18 - 18)  SpO2: 92% (07-06-22 @ 05:12) (92% - 96%)    Constitutional: well developed, normal appearance, well groomed, well nourished, no deformities and no acute distress.   Eyes: the conjunctiva exhibited no abnormalities and the eyelids demonstrated no xanthelasmas.   HEENT: normal oral mucosa, no oral pallor and no oral cyanosis.   Neck: normal jugular venous A waves present, normal jugular venous V waves present and no jugular venous wilson A waves.   Pulmonary: no respiratory distress, normal respiratory rhythm and effort, no accessory muscle use and lungs were clear to auscultation bilaterally.   Cardiovascular: heart rate and rhythm were normal, normal S1 and S2 and no murmur, gallop, rub, heave or thrill are present.   Abdomen: soft, non-tender, no hepato-splenomegaly and no abdominal mass palpated.   Musculoskeletal: the gait could not be assessed..   Extremities: no clubbing of the fingernails, no localized cyanosis, no petechial hemorrhages and no ischemic changes.   Skin: normal skin color and pigmentation, no rash, no venous stasis, no skin lesions, no skin ulcer and no xanthoma was observed.   Psychiatric: oriented to person, place, and time, the affect was normal, the mood was normal and not feeling anxious.     LABS:   --------  07-06    138  |  98  |  39<H>  ----------------------------<  255<H>  4.2   |  29  |  5.20<H>    Ca    9.4      06 Jul 2022 06:10  Phos  4.0     07-04  Mg     2.7     07-04                           9.1    12.78 )-----------( 505      ( 06 Jul 2022 06:10 )             29.1                 RADIOLOGY:  -----------------    ECG:     ECHO:

## 2022-07-06 NOTE — PROGRESS NOTE ADULT - ASSESSMENT
Patient is a 71 year old female with PMH of A.fib rate controlled not on AC for hx of multiple falls, T2DM, HTN, HLD, ESRD on HD, CHF, s/p CABG brought in by EMS for generalized weakness at home. Patient states that she was doing well when in the afternoon she tried to get up from her couch and felt weak in the LE and fell back in her couch. Denies any hx of head trauma or loss of consciousness. Denies any weakness or numbness. Denies any chest pain, SOB or palpitations. No fever, cough or chills. No hx of recent travel or sick contacts. At the time of EMS arrival patient was found to have POCBS of 50. EMS gave dextrose and on arrival to the ED patient blood sugar was found to be in 30's with hypothermia of 94.9.    ED course:   Vitals:   BP: 176/85  HR:76  Temp:94.2  RR:18, O2:96% on RA   Significant Labs:   CBC: WBC:16.82 Hb:13.2 , Platlets: 260, Neutro:89   CMP: Lytes: WNL, BUN/Creatinine:48/4.8, Glucose:134 , Alkaline Phos:128, AST, 41, eGFR: 9, HsTrop: 275.9, ProBNP: 906373, Lactate: 2  UA; negative  CXR: Heart and lung appear normal (self read)  CT BRAIN: No acute intracranial bleeding. Central volume loss, chronic microvascular ischemic changes, and chronic bilateral lacunar infarctions.  CT CERVICAL SPINE: No fracture. Grade 1 C4-C5 anterolisthesis. C6-C7 disc degeneration. Bilateral pleural effusions and interlobular septal thickening due to   interstitial edema.  EKG: NSR, left axis deviation, HR 71,   QT/QTc: 426/462  Patient received: Ceftriaxone 1 g x1, Dextrose 5% + NS 1L x1, Dextrose 50% IV X1, heating blanket, 2L NC   (16 Jun 2022 01:19)        esrd on hd plan for hd as order      Excess fluids and waste products will be removed from your blood; your electrolytes will be balanced; your blood pressure will be controlled.    ANEMIA PLAN:  Anemia of chronic disease:  We will continue epo  aiming for a HCT of 32-36 %.   We will monitor Iron stores, B12 and RBC folate .    BP monitoring,continue current antihypertensive meds, low salt diet  metoprolol tartrate 100 milliGRAM(s) Oral every 12 hours  cloNIDine 0.1 milliGRAM(s) Oral two times a day    f/u  blood and urine cx,serial lactate levels,monitor vitals closley,ivfs hydration,monitor urine output and renal profile,iv abx  ampicillin/sulbactam  IVPB 3 Gram(s) IV Intermittent every 24 hours    dvt heparin   Injectable 5000 Unit(s) SubCutaneous every 12 hours

## 2022-07-06 NOTE — PROGRESS NOTE ADULT - PROBLEM SELECTOR PLAN 1
S/p Right hallux amputation, (DOS: 6/23/22)  - s/p RLE bypass graft  - Dressing left intact. Previous Dressing: Acticoat and DSD.   - Continue IV abx per ID  - Continue vascular recs  - Continue med management   - OR culture Staph Aureus & E. faecalis  - OR pathology: right 1st met clean margin: Minimal chronic osteomyelitis  - Pt to partial weight bear to the right heel as tolerated with assisted device (pt has a history of falls)  - No further podiatric intervention at this time. Pt is stable from out standpoint    WOUND CARE INSTRUCTIONS  1. Please leave dressing clean, dry and intact until follow-up. Monitor for any signs of infection and present to the ED if they arise.  2. If the dressing gets wet, please change with dry, sterile dressing.  3. Patient to be partial weight bear to the right heel as tolerated with assisted device (pt has a history of falls)  4. Patient is to follow up [facility] with Dr. Lau or Dr. Pastor within 3-5 days upon discharge. Please call [phone] as soon as discharged and state that it is for a "post-op appointment".

## 2022-07-06 NOTE — PROGRESS NOTE ADULT - ASSESSMENT
70 yo woman with Hx ERSD, PVD, CAD, CABG admitted 6/16 with R hallux gangrene requiring R fem-pop bypass and partial ray amputation.  Course complicated by   episode of junctional bradycardia (likely med related), resulting in cardiogenic shock and encephalopathy with transfer to ICU, later resolved and transferred to  telemetry, hospital course complicated by  Afib with RVR, encephalopathy.     Problem/Plan :  ·  Problem: Altered Mental Status. Suspect  metabolic encephalopathy along with mild cognitive impairment and subtle dementia becoming more prominent in the hospital setting.  · Patient back to cognitive/mental baseline, but per RN still with periods of confusion   - Fall precautions   -Speech and swallow evaluation - on soft bitesized diet       Problem/Plan :  ·  Problem: Type2 Diabetes, s/p  Hypoglycemia.  ·  Plan: - Patient presented with c/o generalized weakness and fall and was found to have blood sugar in 30's  - Patient with hx of T2DM, on Lantus 40 units qhs not on any oral hypoglycemics per outpatient pharmacy   - Accu checks  - Adjust Insulin dose based upon blood sugar. Endo Dr. Perlman   - continue ISS        Problem/Plan :  ·  Problem: SIRS (systemic inflammatory response syndrome). probably 2/2 to Rt toe infection  ·  Plan: -- CXR: no clear evidence of PNA   - Hx of L medial malleolus growing klebsiella oxytoca/raoutella oxytoca, E. coli, MSSA. Recent blood cultures negative.  - S/P R fem-BK pop w/PTFE POD# 5, S/P Partial ray amputation of R foot  POD #3  - reactive leucocytosis WBC 11.9 (7/5)  - Bone culture growing MSSA  - Blood culture NGTD  - Unasyn per ID recs, to continue until July 26  - ID recs appreciated    Problem/Plan :  ·  Problem: Pleural effusion.   ·  Plan: - No Hx of pulmonary disease  - Patient does not c/o cough, fever or chills  - CT Cervical shows Bilateral pleural effusions and interlobular septal thickening due to interstitial edema.  - ID Dr. Tsai, consult appreciated      Problem/Plan :  ·  Problem: HFrEF (heart failure with reduced ejection fraction).   ·  Plan: - patient with hx of HFrEF  - last ECHO in 04/22 shows EF of 45%, moderate MR  - Admission labs show ProBNP of [id number]  - Likely elevated in the setting of ESRD  - Consult Dr. James, appreciate recs   - Avoid excessive fluids.     Problem/Plan :  ·  Problem: Elevated troponin.   ·  Plan: - Admission labs show elevated Troponin of 275.9  - Patient denies any chest pain, sob or palpitations  - EKG shows NSR with left axis deviation with non specific ST and T waves changes  - elevated troponin likely due to ESRD     Problem/Plan :  ·  Problem: ESRD on hemodialysis.   ·  Plan: - Patient with hx of ESRD  - On HD TTS schedule  - admission eGFR 9  - continue epoetin-fawad  - Follow Dr. Edwards      Problem/Plan :  ·  Problem: T2DM (type 2 diabetes mellitus).   ·  Plan: - Chronic stable  - home dose Lantus 40 qhs  - plan as above     Problem/Plan :  ·  Problem: Atrial fibrillation.   ·  Plan: - Patient with hx of P A.Fib  - Metoprolol 100 q12h  - Not on any AC because of hx of multiple falls   - EKG shows NSR with left axis deviation with non specific ST and T waves changes.     Problem/Plan :  ·  Problem: Benign essential HTN.   ·  Plan: - Chronic stable  - Continue clonidine 0.1mg BID  - Dash/Renal Diet  - continue to monitor routine hemodynamics.     Problem/Plan :  ·  Problem: HLD (hyperlipidemia).   ·  Plan; - Chronic stable  - On atorvastatin 40mg at home - will continue  - Dash/Renal diet.     Problem/Plan :  ·  Problem: Depression, major.   ·  Plan: - Chronic stable  - Continue imipramine 50qhs.     Problem/Plan :  Junctional bradycardia resulting in cardiogenic shock  - Transferred to ICU, now resolved  - Clonidine for htn  - Lopressor 100mg q12h per cardio recs        Problem/Plan :  ·  Problem: Need for prophylactic measure.   ·  Plan: - DVT Prophylaxis: Heparin 5000 units q12.

## 2022-07-07 ENCOUNTER — TRANSCRIPTION ENCOUNTER (OUTPATIENT)
Age: 72
End: 2022-07-07

## 2022-07-07 VITALS
WEIGHT: 113.1 LBS | OXYGEN SATURATION: 93 % | RESPIRATION RATE: 19 BRPM | HEART RATE: 110 BPM | DIASTOLIC BLOOD PRESSURE: 80 MMHG | SYSTOLIC BLOOD PRESSURE: 142 MMHG | TEMPERATURE: 98 F

## 2022-07-07 LAB
ANION GAP SERPL CALC-SCNC: 9 MMOL/L — SIGNIFICANT CHANGE UP (ref 5–17)
BUN SERPL-MCNC: 26 MG/DL — HIGH (ref 7–23)
CALCIUM SERPL-MCNC: 9.2 MG/DL — SIGNIFICANT CHANGE UP (ref 8.5–10.1)
CHLORIDE SERPL-SCNC: 101 MMOL/L — SIGNIFICANT CHANGE UP (ref 96–108)
CO2 SERPL-SCNC: 29 MMOL/L — SIGNIFICANT CHANGE UP (ref 22–31)
CREAT SERPL-MCNC: 3.5 MG/DL — HIGH (ref 0.5–1.3)
EGFR: 13 ML/MIN/1.73M2 — LOW
GLUCOSE SERPL-MCNC: 158 MG/DL — HIGH (ref 70–99)
HCT VFR BLD CALC: 29.5 % — LOW (ref 34.5–45)
HGB BLD-MCNC: 9.1 G/DL — LOW (ref 11.5–15.5)
MCHC RBC-ENTMCNC: 28.8 PG — SIGNIFICANT CHANGE UP (ref 27–34)
MCHC RBC-ENTMCNC: 30.8 GM/DL — LOW (ref 32–36)
MCV RBC AUTO: 93.4 FL — SIGNIFICANT CHANGE UP (ref 80–100)
NRBC # BLD: 0 /100 WBCS — SIGNIFICANT CHANGE UP (ref 0–0)
PLATELET # BLD AUTO: 424 K/UL — HIGH (ref 150–400)
POTASSIUM SERPL-MCNC: 3.8 MMOL/L — SIGNIFICANT CHANGE UP (ref 3.5–5.3)
POTASSIUM SERPL-SCNC: 3.8 MMOL/L — SIGNIFICANT CHANGE UP (ref 3.5–5.3)
RBC # BLD: 3.16 M/UL — LOW (ref 3.8–5.2)
RBC # FLD: 18.9 % — HIGH (ref 10.3–14.5)
SODIUM SERPL-SCNC: 139 MMOL/L — SIGNIFICANT CHANGE UP (ref 135–145)
WBC # BLD: 11.42 K/UL — HIGH (ref 3.8–10.5)
WBC # FLD AUTO: 11.42 K/UL — HIGH (ref 3.8–10.5)

## 2022-07-07 PROCEDURE — 87075 CULTR BACTERIA EXCEPT BLOOD: CPT

## 2022-07-07 PROCEDURE — 83036 HEMOGLOBIN GLYCOSYLATED A1C: CPT

## 2022-07-07 PROCEDURE — 97161 PT EVAL LOW COMPLEX 20 MIN: CPT

## 2022-07-07 PROCEDURE — C1889: CPT

## 2022-07-07 PROCEDURE — 88307 TISSUE EXAM BY PATHOLOGIST: CPT

## 2022-07-07 PROCEDURE — 0225U NFCT DS DNA&RNA 21 SARSCOV2: CPT

## 2022-07-07 PROCEDURE — 82553 CREATINE MB FRACTION: CPT

## 2022-07-07 PROCEDURE — 85652 RBC SED RATE AUTOMATED: CPT

## 2022-07-07 PROCEDURE — 70450 CT HEAD/BRAIN W/O DYE: CPT

## 2022-07-07 PROCEDURE — 82962 GLUCOSE BLOOD TEST: CPT

## 2022-07-07 PROCEDURE — U0003: CPT

## 2022-07-07 PROCEDURE — 96374 THER/PROPH/DIAG INJ IV PUSH: CPT

## 2022-07-07 PROCEDURE — 86900 BLOOD TYPING SEROLOGIC ABO: CPT

## 2022-07-07 PROCEDURE — 81003 URINALYSIS AUTO W/O SCOPE: CPT

## 2022-07-07 PROCEDURE — 85730 THROMBOPLASTIN TIME PARTIAL: CPT

## 2022-07-07 PROCEDURE — 86140 C-REACTIVE PROTEIN: CPT

## 2022-07-07 PROCEDURE — 82140 ASSAY OF AMMONIA: CPT

## 2022-07-07 PROCEDURE — 97110 THERAPEUTIC EXERCISES: CPT

## 2022-07-07 PROCEDURE — 83880 ASSAY OF NATRIURETIC PEPTIDE: CPT

## 2022-07-07 PROCEDURE — 84100 ASSAY OF PHOSPHORUS: CPT

## 2022-07-07 PROCEDURE — G1004: CPT

## 2022-07-07 PROCEDURE — 85610 PROTHROMBIN TIME: CPT

## 2022-07-07 PROCEDURE — 82550 ASSAY OF CK (CPK): CPT

## 2022-07-07 PROCEDURE — 99261: CPT

## 2022-07-07 PROCEDURE — 83605 ASSAY OF LACTIC ACID: CPT

## 2022-07-07 PROCEDURE — 72125 CT NECK SPINE W/O DYE: CPT | Mod: MG

## 2022-07-07 PROCEDURE — 83735 ASSAY OF MAGNESIUM: CPT

## 2022-07-07 PROCEDURE — 87086 URINE CULTURE/COLONY COUNT: CPT

## 2022-07-07 PROCEDURE — U0005: CPT

## 2022-07-07 PROCEDURE — 87077 CULTURE AEROBIC IDENTIFY: CPT

## 2022-07-07 PROCEDURE — 84443 ASSAY THYROID STIM HORMONE: CPT

## 2022-07-07 PROCEDURE — 82803 BLOOD GASES ANY COMBINATION: CPT

## 2022-07-07 PROCEDURE — 93005 ELECTROCARDIOGRAM TRACING: CPT

## 2022-07-07 PROCEDURE — 87040 BLOOD CULTURE FOR BACTERIA: CPT

## 2022-07-07 PROCEDURE — 87070 CULTURE OTHR SPECIMN AEROBIC: CPT

## 2022-07-07 PROCEDURE — 80048 BASIC METABOLIC PNL TOTAL CA: CPT

## 2022-07-07 PROCEDURE — 99285 EMERGENCY DEPT VISIT HI MDM: CPT | Mod: 25

## 2022-07-07 PROCEDURE — 80074 ACUTE HEPATITIS PANEL: CPT

## 2022-07-07 PROCEDURE — 88311 DECALCIFY TISSUE: CPT

## 2022-07-07 PROCEDURE — 71250 CT THORAX DX C-: CPT

## 2022-07-07 PROCEDURE — 87389 HIV-1 AG W/HIV-1&-2 AB AG IA: CPT

## 2022-07-07 PROCEDURE — 86850 RBC ANTIBODY SCREEN: CPT

## 2022-07-07 PROCEDURE — 99239 HOSP IP/OBS DSCHRG MGMT >30: CPT | Mod: GC

## 2022-07-07 PROCEDURE — 80053 COMPREHEN METABOLIC PANEL: CPT

## 2022-07-07 PROCEDURE — 36415 COLL VENOUS BLD VENIPUNCTURE: CPT

## 2022-07-07 PROCEDURE — 97530 THERAPEUTIC ACTIVITIES: CPT

## 2022-07-07 PROCEDURE — 86901 BLOOD TYPING SEROLOGIC RH(D): CPT

## 2022-07-07 PROCEDURE — 84145 PROCALCITONIN (PCT): CPT

## 2022-07-07 PROCEDURE — 73630 X-RAY EXAM OF FOOT: CPT

## 2022-07-07 PROCEDURE — 93922 UPR/L XTREMITY ART 2 LEVELS: CPT

## 2022-07-07 PROCEDURE — 71045 X-RAY EXAM CHEST 1 VIEW: CPT

## 2022-07-07 PROCEDURE — 92610 EVALUATE SWALLOWING FUNCTION: CPT

## 2022-07-07 PROCEDURE — 87641 MR-STAPH DNA AMP PROBE: CPT

## 2022-07-07 PROCEDURE — 87635 SARS-COV-2 COVID-19 AMP PRB: CPT

## 2022-07-07 PROCEDURE — 84484 ASSAY OF TROPONIN QUANT: CPT

## 2022-07-07 PROCEDURE — 87186 SC STD MICRODIL/AGAR DIL: CPT

## 2022-07-07 PROCEDURE — C1768: CPT

## 2022-07-07 PROCEDURE — 87640 STAPH A DNA AMP PROBE: CPT

## 2022-07-07 PROCEDURE — 97116 GAIT TRAINING THERAPY: CPT

## 2022-07-07 PROCEDURE — 85025 COMPLETE CBC W/AUTO DIFF WBC: CPT

## 2022-07-07 PROCEDURE — 82607 VITAMIN B-12: CPT

## 2022-07-07 PROCEDURE — 85027 COMPLETE CBC AUTOMATED: CPT

## 2022-07-07 RX ORDER — RISPERIDONE 4 MG/1
1 TABLET ORAL
Qty: 0 | Refills: 0 | DISCHARGE
Start: 2022-07-07

## 2022-07-07 RX ORDER — ZINC SULFATE TAB 220 MG (50 MG ZINC EQUIVALENT) 220 (50 ZN) MG
1 TAB ORAL
Qty: 0 | Refills: 0 | DISCHARGE
Start: 2022-07-07

## 2022-07-07 RX ORDER — INSULIN GLARGINE 100 [IU]/ML
20 INJECTION, SOLUTION SUBCUTANEOUS
Qty: 0 | Refills: 0 | DISCHARGE
Start: 2022-07-07

## 2022-07-07 RX ORDER — ASPIRIN/CALCIUM CARB/MAGNESIUM 324 MG
1 TABLET ORAL
Qty: 0 | Refills: 0 | DISCHARGE
Start: 2022-07-07

## 2022-07-07 RX ORDER — METOPROLOL TARTRATE 50 MG
1 TABLET ORAL
Qty: 0 | Refills: 0 | DISCHARGE
Start: 2022-07-07

## 2022-07-07 RX ORDER — CLOPIDOGREL BISULFATE 75 MG/1
1 TABLET, FILM COATED ORAL
Qty: 0 | Refills: 0 | DISCHARGE
Start: 2022-07-07

## 2022-07-07 RX ORDER — PANTOPRAZOLE SODIUM 20 MG/1
1 TABLET, DELAYED RELEASE ORAL
Qty: 0 | Refills: 0 | DISCHARGE

## 2022-07-07 RX ORDER — LANOLIN ALCOHOL/MO/W.PET/CERES
1 CREAM (GRAM) TOPICAL
Qty: 0 | Refills: 0 | DISCHARGE
Start: 2022-07-07

## 2022-07-07 RX ORDER — INSULIN GLARGINE 100 [IU]/ML
12 INJECTION, SOLUTION SUBCUTANEOUS
Qty: 0 | Refills: 0 | DISCHARGE
Start: 2022-07-07

## 2022-07-07 RX ORDER — ATORVASTATIN CALCIUM 80 MG/1
1 TABLET, FILM COATED ORAL
Qty: 0 | Refills: 0 | DISCHARGE
Start: 2022-07-07

## 2022-07-07 RX ORDER — OLANZAPINE 15 MG/1
2.5 TABLET, FILM COATED ORAL
Qty: 0 | Refills: 0 | DISCHARGE
Start: 2022-07-07

## 2022-07-07 RX ORDER — AMLODIPINE BESYLATE 2.5 MG/1
1 TABLET ORAL
Qty: 0 | Refills: 0 | DISCHARGE

## 2022-07-07 RX ORDER — PANTOPRAZOLE SODIUM 20 MG/1
1 TABLET, DELAYED RELEASE ORAL
Qty: 0 | Refills: 0 | DISCHARGE
Start: 2022-07-07

## 2022-07-07 RX ADMIN — Medication 81 MILLIGRAM(S): at 12:25

## 2022-07-07 RX ADMIN — Medication 100 MILLIGRAM(S): at 05:23

## 2022-07-07 RX ADMIN — Medication 50 MILLIGRAM(S): at 12:26

## 2022-07-07 RX ADMIN — Medication 1 TABLET(S): at 12:26

## 2022-07-07 RX ADMIN — CLOPIDOGREL BISULFATE 75 MILLIGRAM(S): 75 TABLET, FILM COATED ORAL at 12:25

## 2022-07-07 RX ADMIN — Medication 2: at 13:04

## 2022-07-07 RX ADMIN — Medication 2: at 08:49

## 2022-07-07 RX ADMIN — Medication 0.1 MILLIGRAM(S): at 05:23

## 2022-07-07 RX ADMIN — ZINC SULFATE TAB 220 MG (50 MG ZINC EQUIVALENT) 220 MILLIGRAM(S): 220 (50 ZN) TAB at 12:26

## 2022-07-07 RX ADMIN — PANTOPRAZOLE SODIUM 40 MILLIGRAM(S): 20 TABLET, DELAYED RELEASE ORAL at 05:23

## 2022-07-07 RX ADMIN — HEPARIN SODIUM 5000 UNIT(S): 5000 INJECTION INTRAVENOUS; SUBCUTANEOUS at 05:23

## 2022-07-07 RX ADMIN — RISPERIDONE 0.5 MILLIGRAM(S): 4 TABLET ORAL at 05:23

## 2022-07-07 NOTE — PROGRESS NOTE ADULT - SUBJECTIVE AND OBJECTIVE BOX
Patient is a 71y Female with a known history of :  SIRS (systemic inflammatory response syndrome) [R65.10]    Hypoglycemia [E16.2]    Pleural effusion [J90]    CHF, acute [I50.9]    Chronic CHF [I50.9]    Elevated troponin [R77.8]    Atrial fibrillation [I48.91]    Benign essential HTN [I10]    HLD (hyperlipidemia) [E78.5]    Depression, major [F32.9]    Need for prophylactic measure [Z29.9]    ESRD on hemodialysis [N18.6]    T2DM (type 2 diabetes mellitus) [E11.9]    HFrEF (heart failure with reduced ejection fraction) [I50.20]    Gangrene of toe of right foot [I96]    Atherosclerotic PVD with ulceration [I70.209]    PVD (peripheral vascular disease) [I73.9]      HPI:  Patient is a 71 year old female with PMH of A.fib rate controlled not on AC for hx of multiple falls, T2DM, HTN, HLD, ESRD on HD, CHF, s/p CABG brought in by EMS for generalized weakness at home. Patient states that she was doing well when in the afternoon she tried to get up from her couch and felt weak in the LE and fell back in her couch. Denies any hx of head trauma or loss of consciousness. Denies any weakness or numbness. Denies any chest pain, SOB or palpitations. No fever, cough or chills. No hx of recent travel or sick contacts. At the time of EMS arrival patient was found to have POCBS of 50. EMS gave dextrose and on arrival to the ED patient blood sugar was found to be in 30's with hypothermia of 94.9.    ED course:   Vitals:   BP: 176/85  HR:76  Temp:94.2  RR:18, O2:96% on RA   Significant Labs:   CBC: WBC:16.82 Hb:13.2 , Platlets: 260, Neutro:89   CMP: Lytes: WNL, BUN/Creatinine:48/4.8, Glucose:134 , Alkaline Phos:128, AST, 41, eGFR: 9, HsTrop: 275.9, ProBNP: 116289, Lactate: 2  UA; negative  CXR: Heart and lung appear normal (self read)  CT BRAIN: No acute intracranial bleeding. Central volume loss, chronic microvascular ischemic changes, and chronic bilateral lacunar infarctions.  CT CERVICAL SPINE: No fracture. Grade 1 C4-C5 anterolisthesis. C6-C7 disc degeneration. Bilateral pleural effusions and interlobular septal thickening due to   interstitial edema.  EKG: NSR, left axis deviation, HR 71,   QT/QTc: 426/462  Patient received: Ceftriaxone 1 g x1, Dextrose 5% + NS 1L x1, Dextrose 50% IV X1, heating blanket, 2L NC   (16 Jun 2022 01:19)      REVIEW OF SYSTEMS:    CONSTITUTIONAL: No fever, weight loss, or fatigue  EYES: No eye pain, visual disturbances, or discharge  ENMT:  No difficulty hearing, tinnitus, vertigo; No sinus or throat pain  NECK: No pain or stiffness  BREASTS: No pain, masses, or nipple discharge  RESPIRATORY: No cough, wheezing, chills or hemoptysis; No shortness of breath  CARDIOVASCULAR: No chest pain, palpitations, dizziness, or leg swelling  GASTROINTESTINAL: No abdominal or epigastric pain. No nausea, vomiting, or hematemesis; No diarrhea or constipation. No melena or hematochezia.  GENITOURINARY: No dysuria, frequency, hematuria, or incontinence  NEUROLOGICAL: No headaches, memory loss, loss of strength, numbness, or tremors  SKIN: No itching, burning, rashes, or lesions   LYMPH NODES: No enlarged glands  ENDOCRINE: No heat or cold intolerance; No hair loss  MUSCULOSKELETAL: No joint pain or swelling; No muscle, back, or extremity pain  PSYCHIATRIC: No depression, anxiety, mood swings, or difficulty sleeping  HEME/LYMPH: No easy bruising, or bleeding gums  ALLERGY AND IMMUNOLOGIC: No hives or eczema    MEDICATIONS  (STANDING):  ampicillin/sulbactam  IVPB 3 Gram(s) IV Intermittent every 24 hours  ampicillin/sulbactam  IVPB      aspirin enteric coated 81 milliGRAM(s) Oral daily  atorvastatin 40 milliGRAM(s) Oral at bedtime  chlorhexidine 4% Liquid 1 Application(s) Topical <User Schedule>  cloNIDine 0.1 milliGRAM(s) Oral two times a day  clopidogrel Tablet 75 milliGRAM(s) Oral daily  dextrose 5%. 1000 milliLiter(s) (50 mL/Hr) IV Continuous <Continuous>  dextrose 50% Injectable 25 Gram(s) IV Push once  epoetin fawad-epbx (RETACRIT) Injectable 84792 Unit(s) IV Push <User Schedule>  glucagon  Injectable 1 milliGRAM(s) IntraMuscular once  heparin   Injectable 5000 Unit(s) SubCutaneous every 12 hours  imipramine 50 milliGRAM(s) Oral daily  insulin glargine Injectable (LANTUS) 12 Unit(s) SubCutaneous at bedtime  insulin lispro (ADMELOG) corrective regimen sliding scale   SubCutaneous three times a day before meals  insulin lispro (ADMELOG) corrective regimen sliding scale   SubCutaneous at bedtime  metoprolol tartrate 100 milliGRAM(s) Oral every 12 hours  Nephro-elvie 1 Tablet(s) Oral daily  pantoprazole    Tablet 40 milliGRAM(s) Oral before breakfast  risperiDONE   Tablet 0.5 milliGRAM(s) Oral two times a day  zinc sulfate 220 milliGRAM(s) Oral daily    MEDICATIONS  (PRN):  acetaminophen     Tablet .. 650 milliGRAM(s) Oral every 6 hours PRN Temp greater or equal to 38C (100.4F), Mild Pain (1 - 3)  aluminum hydroxide/magnesium hydroxide/simethicone Suspension 30 milliLiter(s) Oral every 4 hours PRN Dyspepsia  dextrose Oral Gel 15 Gram(s) Oral once PRN Blood Glucose LESS THAN 70 milliGRAM(s)/deciliter  melatonin 3 milliGRAM(s) Oral at bedtime PRN Insomnia  OLANZapine Injectable 2.5 milliGRAM(s) IntraMuscular every 6 hours PRN Acute agitation      ALLERGIES: latex (Unknown)  No Known Drug Allergies      FAMILY HISTORY:  No pertinent family history in first degree relatives        PHYSICAL EXAMINATION:  -----------------------------  T(C): 36.9 (07-07-22 @ 04:39), Max: 37.1 (07-06-22 @ 21:00)  HR: 110 (07-07-22 @ 04:39) (76 - 110)  BP: 142/80 (07-07-22 @ 04:39) (93/53 - 161/70)  RR: 19 (07-07-22 @ 04:39) (15 - 19)  SpO2: 93% (07-07-22 @ 04:39) (93% - 98%)  Wt(kg): --    07-06 @ 07:01  -  07-07 @ 07:00  --------------------------------------------------------  IN:  Total IN: 0 mL    OUT:    Other (mL): 2000 mL  Total OUT: 2000 mL    Total NET: -2000 mL            VITALS  T(C): 36.9 (07-07-22 @ 04:39), Max: 37.1 (07-06-22 @ 21:00)  HR: 110 (07-07-22 @ 04:39) (76 - 110)  BP: 142/80 (07-07-22 @ 04:39) (93/53 - 161/70)  RR: 19 (07-07-22 @ 04:39) (15 - 19)  SpO2: 93% (07-07-22 @ 04:39) (93% - 98%)    Constitutional: well developed, normal appearance, well groomed, well nourished, no deformities and no acute distress.   Eyes: the conjunctiva exhibited no abnormalities and the eyelids demonstrated no xanthelasmas.   HEENT: normal oral mucosa, no oral pallor and no oral cyanosis.   Neck: normal jugular venous A waves present, normal jugular venous V waves present and no jugular venous wilson A waves.   Pulmonary: no respiratory distress, normal respiratory rhythm and effort, no accessory muscle use and lungs were clear to auscultation bilaterally.   Cardiovascular: heart rate and rhythm were normal, normal S1 and S2 and no murmur, gallop, rub, heave or thrill are present.   Abdomen: soft, non-tender, no hepato-splenomegaly and no abdominal mass palpated.   Musculoskeletal: the gait could not be assessed..   Extremities: no clubbing of the fingernails, no localized cyanosis, no petechial hemorrhages and no ischemic changes.   Skin: normal skin color and pigmentation, no rash, no venous stasis, no skin lesions, no skin ulcer and no xanthoma was observed.   Psychiatric: oriented to person, place, and time, the affect was normal, the mood was normal and not feeling anxious.     LABS:   --------  07-07    139  |  101  |  26<H>  ----------------------------<  158<H>  3.8   |  29  |  3.50<H>    Ca    9.2      07 Jul 2022 06:20                           9.1    11.42 )-----------( 424      ( 07 Jul 2022 06:20 )             29.5                 RADIOLOGY:  -----------------    ECG:     ECHO:

## 2022-07-07 NOTE — CHART NOTE - NSCHARTNOTEFT_GEN_A_CORE
Nutr. re-assessment:     Factors impacting intake: [ ] none [ ] nausea  [ ] vomiting [ ] diarrhea [ ] constipation  [ ]chewing problems [ ] swallowing issues  [ ] other:     Diet Prescription: Diet, Renal Restrictions:   For patients receiving Renal Replacement - No Protein Restr, No Conc K, No Conc Phos, Low Sodium  Consistent Carbohydrate {No Snacks}  Soft and Bite Sized (SOFTBTSZ)  Mildly Thick Liquids (MILDTHICKLIQS)  Abel(7 Gm Arginine/7 Gm Glut/1.2 Gm HMB     Qty per Day:  BID  Supplement Feeding Modality:  Oral  Nepro Cans or Servings Per Day:  1       Frequency:  Two Times a day (07-04-22 @ 14:45)    Intake:     Current Weight:   % Weight Change    Pertinent Medications: MEDICATIONS  (STANDING):  ampicillin/sulbactam  IVPB 3 Gram(s) IV Intermittent every 24 hours  ampicillin/sulbactam  IVPB      aspirin enteric coated 81 milliGRAM(s) Oral daily  atorvastatin 40 milliGRAM(s) Oral at bedtime  chlorhexidine 4% Liquid 1 Application(s) Topical <User Schedule>  cloNIDine 0.1 milliGRAM(s) Oral two times a day  clopidogrel Tablet 75 milliGRAM(s) Oral daily  dextrose 5%. 1000 milliLiter(s) (50 mL/Hr) IV Continuous <Continuous>  dextrose 50% Injectable 25 Gram(s) IV Push once  epoetin fawad-epbx (RETACRIT) Injectable 33078 Unit(s) IV Push <User Schedule>  glucagon  Injectable 1 milliGRAM(s) IntraMuscular once  heparin   Injectable 5000 Unit(s) SubCutaneous every 12 hours  imipramine 50 milliGRAM(s) Oral daily  insulin glargine Injectable (LANTUS) 12 Unit(s) SubCutaneous at bedtime  insulin lispro (ADMELOG) corrective regimen sliding scale   SubCutaneous three times a day before meals  insulin lispro (ADMELOG) corrective regimen sliding scale   SubCutaneous at bedtime  metoprolol tartrate 100 milliGRAM(s) Oral every 12 hours  Nephro-elvie 1 Tablet(s) Oral daily  pantoprazole    Tablet 40 milliGRAM(s) Oral before breakfast  risperiDONE   Tablet 0.5 milliGRAM(s) Oral two times a day  zinc sulfate 220 milliGRAM(s) Oral daily    MEDICATIONS  (PRN):  acetaminophen     Tablet .. 650 milliGRAM(s) Oral every 6 hours PRN Temp greater or equal to 38C (100.4F), Mild Pain (1 - 3)  aluminum hydroxide/magnesium hydroxide/simethicone Suspension 30 milliLiter(s) Oral every 4 hours PRN Dyspepsia  dextrose Oral Gel 15 Gram(s) Oral once PRN Blood Glucose LESS THAN 70 milliGRAM(s)/deciliter  melatonin 3 milliGRAM(s) Oral at bedtime PRN Insomnia  OLANZapine Injectable 2.5 milliGRAM(s) IntraMuscular every 6 hours PRN Acute agitation    Pertinent Labs: 07-07 Na139 mmol/L Glu 158 mg/dL<H> K+ 3.8 mmol/L Cr  3.50 mg/dL<H> BUN 26 mg/dL<H> 07-04 Phos 4.0 mg/dL 07-01 Alb 2.1 g/dL<L>     CAPILLARY BLOOD GLUCOSE      POCT Blood Glucose.: 157 mg/dL (07 Jul 2022 08:37)  POCT Blood Glucose.: 233 mg/dL (06 Jul 2022 21:00)  POCT Blood Glucose.: 287 mg/dL (06 Jul 2022 17:04)  POCT Blood Glucose.: 237 mg/dL (06 Jul 2022 13:56)  POCT Blood Glucose.: 160 mg/dL (06 Jul 2022 12:24)    Skin:     Estimated Needs:   [ ] no change since previous assessment  [ ] recalculated:     Previous Nutrition Diagnosis:   [ ] Inadequate Energy Intake [ ]Inadequate Oral Intake [ ] Excessive Energy Intake   [ ] Underweight [ ] Increased Nutrient Needs [ ] Overweight/Obesity   [ ] Altered GI Function [ ] Unintended Weight Loss [ ] Food & Nutrition Related Knowledge Deficit [ ] Malnutrition     Nutrition Diagnosis is [ ] ongoing  [ ] resolved [ ] not applicable     New Nutrition Diagnosis: [ ] not applicable       Interventions:   Recommend  [ ] Change Diet To:  [ ] Nutrition Supplement  [ ] Nutrition Support  [ ] Other:     Monitoring and Evaluation:   [ ] PO intake [ x ] Tolerance to diet prescription [ x ] weights [ x ] labs[ x ] follow up per protocol  [ ] other:

## 2022-07-07 NOTE — PROGRESS NOTE ADULT - PROBLEM SELECTOR PLAN 1
S/p Right hallux amputation, (DOS: 6/23/22)  - s/p RLE bypass graft  - Dressing changed: Acticoat and DSD.   - Continue IV abx per ID  - Continue vascular recs  - Continue med management   - OR culture Staph Aureus & E. faecalis  - OR pathology: right 1st met clean margin: Minimal chronic osteomyelitis  - Pt to partial weight bear to the right heel as tolerated with assisted device (pt has a history of falls)  - No further podiatric intervention at this time. Pt is stable from out standpoint    WOUND CARE INSTRUCTIONS  1. Please leave dressing clean, dry and intact until follow-up. Monitor for any signs of infection and present to the ED if they arise.  2. If the dressing gets wet, please change with dry, sterile dressing.  3. Patient to be partial weight bear to the right heel as tolerated with assisted device (pt has a history of falls)  4. Patient is to follow up [facility] with Dr. Lau or Dr. Pastor within 3-5 days upon discharge. Please call [phone] as soon as discharged and state that it is for a "post-op appointment".

## 2022-07-07 NOTE — PROGRESS NOTE ADULT - TIME BILLING
as above
Reviewing medical record including consultant recommendations, labs, vitals, medications, orders, imaging, and discussion of plan of care with patient, family, consultants, and nursing.
Reviewing medical record including consultant recommendations, labs, vitals, medications, orders, imaging, and discussion of plan of care with patient, family, consultants, and nursing.
as above
as abpve
as above
Meds, labs, vitals, f/u consult reviewed.
Meds, labs, vitals, f/u consult reviewed.
Patient seen and examined at bedside. Chart, meds, labs, vitals reviewed. Plan as above, d/w residents, consultants, SW/CM
Patient seen and examined at bedside. Meds, labs, vitals, f/u consult notes reviewed. Plan as above, d/w team
Reviewing medical record including consultant recommendations, labs, vitals, medications, orders, imaging, and discussion of plan of care with patient, family, consultants, and nursing.
as above
as above
Meds, labs, vitals, f/u consult reviewed.
Patient seen and examined at bedside. Meds, labs, vitals, f/u consult notes reviewed. Plan as above, d/w team
Patient seen and examined at bedside. Meds, labs, vitals, f/u consult notes reviewed. Plan as above, d/w team

## 2022-07-07 NOTE — PROGRESS NOTE ADULT - PROBLEM SELECTOR PLAN 2
- Continue med management
continue med management
- Continue med management
continue med management

## 2022-07-07 NOTE — PROGRESS NOTE ADULT - ASSESSMENT
70 yo woman with Hx ERSD, PVD, CAD, CABG admitted 6/16 with R hallux gangrene requiring R fem-pop bypass and partial ray amputation.  Course complicated by   episode of junctional bradycardia (likely med related), resulting in cardiogenic shock and encephalopathy with transfer to ICU, later resolved and transferred to  telemetry, hospital course complicated by  Afib with RVR, encephalopathy.     Problem/Plan :  ·  Problem: Altered Mental Status. Suspect  metabolic encephalopathy along with mild cognitive impairment and subtle dementia becoming more prominent in the hospital setting.  · Patient back to cognitive/mental baseline, but per RN still with periods of confusion   - Fall precautions   -Speech and swallow evaluation - on soft bitesized diet       Problem/Plan :  ·  Problem: Type2 Diabetes, s/p  Hypoglycemia.  ·  Plan: - Patient presented with c/o generalized weakness and fall and was found to have blood sugar in 30's  - Patient with hx of T2DM, on Lantus 40 units qhs not on any oral hypoglycemics per outpatient pharmacy   - Accu checks  - Adjust Insulin dose based upon blood sugar. Endo Dr. Perlman   - continue ISS        Problem/Plan :  ·  Problem: SIRS (systemic inflammatory response syndrome). probably 2/2 to Rt toe infection  ·  Plan: -- CXR: no clear evidence of PNA   - Hx of L medial malleolus growing klebsiella oxytoca/raoutella oxytoca, E. coli, MSSA. Recent blood cultures negative.  - S/P R fem-BK pop w/PTFE POD# 5, S/P Partial ray amputation of R foot  POD #3  - reactive leucocytosis WBC 11.9 (7/5)  - Bone culture growing MSSA  - Blood culture NGTD  - Unasyn per ID recs, to continue until July 26  - ID recs appreciated    Problem/Plan :  ·  Problem: Pleural effusion.   ·  Plan: - No Hx of pulmonary disease  - Patient does not c/o cough, fever or chills  - CT Cervical shows Bilateral pleural effusions and interlobular septal thickening due to interstitial edema.  - ID Dr. Tsai, consult appreciated      Problem/Plan :  ·  Problem: HFrEF (heart failure with reduced ejection fraction).   ·  Plan: - patient with hx of HFrEF  - last ECHO in 04/22 shows EF of 45%, moderate MR  - Admission labs show ProBNP of [id number]  - Likely elevated in the setting of ESRD  - Consult Dr. James, appreciate recs   - Avoid excessive fluids.     Problem/Plan :  ·  Problem: Elevated troponin.   ·  Plan: - Admission labs show elevated Troponin of 275.9  - Patient denies any chest pain, sob or palpitations  - EKG shows NSR with left axis deviation with non specific ST and T waves changes  - elevated troponin likely due to ESRD     Problem/Plan :  ·  Problem: ESRD on hemodialysis.   ·  Plan: - Patient with hx of ESRD  - On HD TTS schedule  - admission eGFR 9  - continue epoetin-fawad  - Follow Dr. Edwards      Problem/Plan :  ·  Problem: T2DM (type 2 diabetes mellitus).   ·  Plan: - Chronic stable  - home dose Lantus 40 qhs  - plan as above     Problem/Plan :  ·  Problem: Atrial fibrillation.   ·  Plan: - Patient with hx of P A.Fib  - Metoprolol 100 q12h  - Not on any AC because of hx of multiple falls   - EKG shows NSR with left axis deviation with non specific ST and T waves changes.     Problem/Plan :  ·  Problem: Benign essential HTN.   ·  Plan: - Chronic stable  - Continue clonidine 0.1mg BID  - Dash/Renal Diet  - continue to monitor routine hemodynamics.     Problem/Plan :  ·  Problem: HLD (hyperlipidemia).   ·  Plan; - Chronic stable  - On atorvastatin 40mg at home - will continue  - Dash/Renal diet.     Problem/Plan :  ·  Problem: Depression, major.   ·  Plan: - Chronic stable  - Continue imipramine 50qhs.     Problem/Plan :  Junctional bradycardia resulting in cardiogenic shock  - Transferred to ICU, now resolved  - Clonidine for htn  - Lopressor 100mg q12h per cardio recs        Problem/Plan :  ·  Problem: Need for prophylactic measure.   ·  Plan: - DVT Prophylaxis: Heparin 5000 units q12. [FreeTextEntry1] : Today I had the pleasure of meeting Kamryn Liu.  She is originally from [place] but moved to [place] after high school.  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  She has a son who works as a hip hop promoter.  She was healthy for most of her life until about age 48 when she developed hypertension.  She notes that her blood pressure was poorly controlled until about six years ago.  At that time she was notified about stage III CKD and established care with Dr. Cuellar.  By the patient's description Dr. Cuellar fixed her blood pressure by adjusting medications.  Soon after, she found out that she had breast cancer.  She was treated by a right mastectomy and chemotherapy.  She is now maintained on anastrazole.  Recently the patient reports that her blood pressure has actually been a bit low and her nifedipine needed to be tapered down.  We have blood work for her showing a baseline creatinine above 2.  Of note available imaging shows chronic right sided hydronephrosis as well as cystic lesions.  On evaluation today the patient denies all complaints.  She feels great.  In particular she has no dyspnea, no nausea, no chest pain, no fevers, no urinary difficulties.  \par \par 8/19/19 -- Since our last visit Kamryn is doing well.  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  Creatinine during that hospitalization was stable at her baseline from 2 to 2.7.  The patient feels very well today.  she has no complaints.  medications were reviewed.\par \par 12/6/19 -- on my evaluation today Kamryn is doing well.  she has no chest pain no shortness of breath.  no new medications.  she reports urination is stable.

## 2022-07-07 NOTE — CHART NOTE - NSCHARTNOTESELECT_GEN_ALL_CORE
Event Note
Nutrition Services
PGY-1/Rapid Response
Same Day Progress Note/Event Note
Vascular Surgery NP Note/Event Note

## 2022-07-07 NOTE — PROGRESS NOTE ADULT - ASSESSMENT
Patient is a 71 year old female with PMH of A.fib rate controlled not on AC for hx of multiple falls, T2DM, HTN, HLD, ESRD on HD, CHF, s/p CABG brought in by EMS for generalized weakness at home. Patient states that she was doing well when in the afternoon she tried to get up from her couch and felt weak in the LE and fell back in her couch. Denies any hx of head trauma or loss of consciousness. Denies any weakness or numbness. Denies any chest pain, SOB or palpitations. No fever, cough or chills. No hx of recent travel or sick contacts. At the time of EMS arrival patient was found to have POCBS of 50. EMS gave dextrose and on arrival to the ED patient blood sugar was found to be in 30's with hypothermia of 94.9.    ED course:   Vitals:   BP: 176/85  HR:76  Temp:94.2  RR:18, O2:96% on RA   Significant Labs:   CBC: WBC:16.82 Hb:13.2 , Platlets: 260, Neutro:89   CMP: Lytes: WNL, BUN/Creatinine:48/4.8, Glucose:134 , Alkaline Phos:128, AST, 41, eGFR: 9, HsTrop: 275.9, ProBNP: 700749, Lactate: 2  UA; negative  CXR: Heart and lung appear normal (self read)  CT BRAIN: No acute intracranial bleeding. Central volume loss, chronic microvascular ischemic changes, and chronic bilateral lacunar infarctions.  CT CERVICAL SPINE: No fracture. Grade 1 C4-C5 anterolisthesis. C6-C7 disc degeneration. Bilateral pleural effusions and interlobular septal thickening due to   interstitial edema.  EKG: NSR, left axis deviation, HR 71,   QT/QTc: 426/462  Patient received: Ceftriaxone 1 g x1, Dextrose 5% + NS 1L x1, Dextrose 50% IV X1, heating blanket, 2L NC   (16 Jun 2022 01:19)        esrd on hd plan for hd as order      Excess fluids and waste products will be removed from your blood; your electrolytes will be balanced; your blood pressure will be controlled.    ANEMIA PLAN:  Anemia of chronic disease:  We will continue epo  aiming for a HCT of 32-36 %.   We will monitor Iron stores, B12 and RBC folate .    BP monitoring,continue current antihypertensive meds, low salt diet  metoprolol tartrate 100 milliGRAM(s) Oral every 12 hours  cloNIDine 0.1 milliGRAM(s) Oral two times a day    f/u  blood and urine cx,serial lactate levels,monitor vitals closley,ivfs hydration,monitor urine output and renal profile,iv abx  ampicillin/sulbactam  IVPB 3 Gram(s) IV Intermittent every 24 hours    dvt heparin   Injectable 5000 Unit(s) SubCutaneous every 12 hours

## 2022-07-07 NOTE — CHART NOTE - NSCHARTNOTEFT_GEN_A_CORE
Nutr.re-assessment/follow up :     Factors impacting intake: [ ] none [ ] nausea  [ ] vomiting [ ] diarrhea [ ] constipation  [ ]chewing problems [ ] swallowing issues  [ ] other:     Diet Prescription: Diet, Renal Restrictions:   For patients receiving Renal Replacement - No Protein Restr, No Conc K, No Conc Phos, Low Sodium  Consistent Carbohydrate {No Snacks}  Soft and Bite Sized (SOFTBTSZ)  Mildly Thick Liquids (MILDTHICKLIQS)  Abel(7 Gm Arginine/7 Gm Glut/1.2 Gm HMB     Qty per Day:  BID  Supplement Feeding Modality:  Oral  Nepro Cans or Servings Per Day:  1       Frequency:  Two Times a day (07-04-22 @ 14:45)    Intake:     Current Weight:   % Weight Change    Pertinent Medications: MEDICATIONS  (STANDING):  ampicillin/sulbactam  IVPB 3 Gram(s) IV Intermittent every 24 hours  ampicillin/sulbactam  IVPB      aspirin enteric coated 81 milliGRAM(s) Oral daily  atorvastatin 40 milliGRAM(s) Oral at bedtime  chlorhexidine 4% Liquid 1 Application(s) Topical <User Schedule>  cloNIDine 0.1 milliGRAM(s) Oral two times a day  clopidogrel Tablet 75 milliGRAM(s) Oral daily  dextrose 5%. 1000 milliLiter(s) (50 mL/Hr) IV Continuous <Continuous>  dextrose 50% Injectable 25 Gram(s) IV Push once  epoetin fawad-epbx (RETACRIT) Injectable 39752 Unit(s) IV Push <User Schedule>  glucagon  Injectable 1 milliGRAM(s) IntraMuscular once  heparin   Injectable 5000 Unit(s) SubCutaneous every 12 hours  imipramine 50 milliGRAM(s) Oral daily  insulin glargine Injectable (LANTUS) 12 Unit(s) SubCutaneous at bedtime  insulin lispro (ADMELOG) corrective regimen sliding scale   SubCutaneous three times a day before meals  insulin lispro (ADMELOG) corrective regimen sliding scale   SubCutaneous at bedtime  metoprolol tartrate 100 milliGRAM(s) Oral every 12 hours  Nephro-elvie 1 Tablet(s) Oral daily  pantoprazole    Tablet 40 milliGRAM(s) Oral before breakfast  risperiDONE   Tablet 0.5 milliGRAM(s) Oral two times a day  zinc sulfate 220 milliGRAM(s) Oral daily    MEDICATIONS  (PRN):  acetaminophen     Tablet .. 650 milliGRAM(s) Oral every 6 hours PRN Temp greater or equal to 38C (100.4F), Mild Pain (1 - 3)  aluminum hydroxide/magnesium hydroxide/simethicone Suspension 30 milliLiter(s) Oral every 4 hours PRN Dyspepsia  dextrose Oral Gel 15 Gram(s) Oral once PRN Blood Glucose LESS THAN 70 milliGRAM(s)/deciliter  melatonin 3 milliGRAM(s) Oral at bedtime PRN Insomnia  OLANZapine Injectable 2.5 milliGRAM(s) IntraMuscular every 6 hours PRN Acute agitation    Pertinent Labs: 07-07 Na139 mmol/L Glu 158 mg/dL<H> K+ 3.8 mmol/L Cr  3.50 mg/dL<H> BUN 26 mg/dL<H> 07-04 Phos 4.0 mg/dL 07-01 Alb 2.1 g/dL<L>     CAPILLARY BLOOD GLUCOSE      POCT Blood Glucose.: 157 mg/dL (07 Jul 2022 08:37)  POCT Blood Glucose.: 233 mg/dL (06 Jul 2022 21:00)  POCT Blood Glucose.: 287 mg/dL (06 Jul 2022 17:04)  POCT Blood Glucose.: 237 mg/dL (06 Jul 2022 13:56)  POCT Blood Glucose.: 160 mg/dL (06 Jul 2022 12:24)    Skin:     Estimated Needs:   [ ] no change since previous assessment  [ ] recalculated:     Previous Nutrition Diagnosis:   [ ] Inadequate Energy Intake [ ]Inadequate Oral Intake [ ] Excessive Energy Intake   [ ] Underweight [ ] Increased Nutrient Needs [ ] Overweight/Obesity   [ ] Altered GI Function [ ] Unintended Weight Loss [ ] Food & Nutrition Related Knowledge Deficit [ ] Malnutrition     Nutrition Diagnosis is [ ] ongoing  [ ] resolved [ ] not applicable     New Nutrition Diagnosis: [ ] not applicable       Interventions:   Recommend  [ ] Change Diet To:  [ ] Nutrition Supplement  [ ] Nutrition Support  [ ] Other:     Monitoring and Evaluation:   [ ] PO intake [ x ] Tolerance to diet prescription [ x ] weights [ x ] labs[ x ] follow up per protocol  [ ] other: Nutr.re-assessment/follow up : 70 yo woman with Hx ERSD, PVD, CAD, CABG admitted 6/16 with R hallux gangrene requiring R fem-pop bypass and partial ray amputation.  Course complicated by   episode of junctional bradycardia (likely med related), resulting in cardiogenic shock and encephalopathy with transfer to ICU, later resolved and transferred to  telemetry, now Afib with RVR. \        Factors impacting intake: [ ] none [ ] nausea  [ ] vomiting [ ] diarrhea [ ] constipation  [ ]chewing problems [ ] swallowing issues  [ ] other:     Diet Prescription: Diet, Renal Restrictions:   For patients receiving Renal Replacement - No Protein Restr, No Conc K, No Conc Phos, Low Sodium  Consistent Carbohydrate {No Snacks}  Soft and Bite Sized (SOFTBTSZ)  Mildly Thick Liquids (MILDTHICKLIQS)  Abel(7 Gm Arginine/7 Gm Glut/1.2 Gm HMB     Qty per Day:  BID  Supplement Feeding Modality:  Oral  Nepro Cans or Servings Per Day:  1       Frequency:  Two Times a day (07-04-22 @ 14:45)    Intake:     Current Weight:   % Weight Change    Pertinent Medications: MEDICATIONS  (STANDING):  ampicillin/sulbactam  IVPB 3 Gram(s) IV Intermittent every 24 hours  ampicillin/sulbactam  IVPB      aspirin enteric coated 81 milliGRAM(s) Oral daily  atorvastatin 40 milliGRAM(s) Oral at bedtime  chlorhexidine 4% Liquid 1 Application(s) Topical <User Schedule>  cloNIDine 0.1 milliGRAM(s) Oral two times a day  clopidogrel Tablet 75 milliGRAM(s) Oral daily  dextrose 5%. 1000 milliLiter(s) (50 mL/Hr) IV Continuous <Continuous>  dextrose 50% Injectable 25 Gram(s) IV Push once  epoetin fawad-epbx (RETACRIT) Injectable 81629 Unit(s) IV Push <User Schedule>  glucagon  Injectable 1 milliGRAM(s) IntraMuscular once  heparin   Injectable 5000 Unit(s) SubCutaneous every 12 hours  imipramine 50 milliGRAM(s) Oral daily  insulin glargine Injectable (LANTUS) 12 Unit(s) SubCutaneous at bedtime  insulin lispro (ADMELOG) corrective regimen sliding scale   SubCutaneous three times a day before meals  insulin lispro (ADMELOG) corrective regimen sliding scale   SubCutaneous at bedtime  metoprolol tartrate 100 milliGRAM(s) Oral every 12 hours  Nephro-elvie 1 Tablet(s) Oral daily  pantoprazole    Tablet 40 milliGRAM(s) Oral before breakfast  risperiDONE   Tablet 0.5 milliGRAM(s) Oral two times a day  zinc sulfate 220 milliGRAM(s) Oral daily    MEDICATIONS  (PRN):  acetaminophen     Tablet .. 650 milliGRAM(s) Oral every 6 hours PRN Temp greater or equal to 38C (100.4F), Mild Pain (1 - 3)  aluminum hydroxide/magnesium hydroxide/simethicone Suspension 30 milliLiter(s) Oral every 4 hours PRN Dyspepsia  dextrose Oral Gel 15 Gram(s) Oral once PRN Blood Glucose LESS THAN 70 milliGRAM(s)/deciliter  melatonin 3 milliGRAM(s) Oral at bedtime PRN Insomnia  OLANZapine Injectable 2.5 milliGRAM(s) IntraMuscular every 6 hours PRN Acute agitation    Pertinent Labs: 07-07 Na139 mmol/L Glu 158 mg/dL<H> K+ 3.8 mmol/L Cr  3.50 mg/dL<H> BUN 26 mg/dL<H> 07-04 Phos 4.0 mg/dL 07-01 Alb 2.1 g/dL<L>     CAPILLARY BLOOD GLUCOSE      POCT Blood Glucose.: 157 mg/dL (07 Jul 2022 08:37)  POCT Blood Glucose.: 233 mg/dL (06 Jul 2022 21:00)  POCT Blood Glucose.: 287 mg/dL (06 Jul 2022 17:04)  POCT Blood Glucose.: 237 mg/dL (06 Jul 2022 13:56)  POCT Blood Glucose.: 160 mg/dL (06 Jul 2022 12:24)    Skin:     Estimated Needs:   [ ] no change since previous assessment  [ ] recalculated:     Previous Nutrition Diagnosis:   [ ] Inadequate Energy Intake [ ]Inadequate Oral Intake [ ] Excessive Energy Intake   [ ] Underweight [ ] Increased Nutrient Needs [ ] Overweight/Obesity   [ ] Altered GI Function [ ] Unintended Weight Loss [ ] Food & Nutrition Related Knowledge Deficit [ ] Malnutrition     Nutrition Diagnosis is [ ] ongoing  [ ] resolved [ ] not applicable     New Nutrition Diagnosis: [ ] not applicable       Interventions:   Recommend  [ ] Change Diet To:  [ ] Nutrition Supplement  [ ] Nutrition Support  [ ] Other:     Monitoring and Evaluation:   [ ] PO intake [ x ] Tolerance to diet prescription [ x ] weights [ x ] labs[ x ] follow up per protocol  [ ] other: Nutr.re-assessment/follow up : ( excerpt from MD progress note) 70 yo woman with Hx ERSD, PVD, CAD, CABG admitted 6/16 with R hallux gangrene requiring R fem-pop bypass and partial ray amputation.  Course complicated by episode of junctional bradycardia (likely med related), resulting in cardiogenic shock and encephalopathy with transfer to ICU, later resolved and transferred to  telemetry, now Afib with RVR.   Per nsg, po intake poor to fair at best.    Factors impacting intake: [ ] none [ ] nausea  [ ] vomiting [ ] diarrhea [ ] constipation  [ ]chewing problems [ X] swallowing issues  [ ] other:     Diet Prescription: Diet, Renal Restrictions:   For patients receiving Renal Replacement - No Protein Restr, No Conc K, No Conc Phos, Low Sodium  Consistent Carbohydrate {No Snacks}  Soft and Bite Sized (SOFTBTSZ)  Mildly Thick Liquids (MILDTHICKLIQS)  Abel(7 Gm Arginine/7 Gm Glut/1.2 Gm HMB     Qty per Day:  BID  Supplement Feeding Modality:  Oral  Nepro Cans or Servings Per Day:  1       Frequency:  Two Times a day (07-04-22 @ 14:45)    Intake: 26-50%    Current Weight:   7/7: 51.3 kg  7/6: 50.9 kg  7/4: pre HD 52.7 kg , post HD 52.2 kg ( 0.5 kg change)   7/3: 52.7 kg  7/2: 54 kg   7/1: pre HD 55.2 kg post HD 53.7 kg ( 1.5 kg change)   % Weight Change: wt fluctations btw HD treatments; likely scale accuracy component    Pertinent Medications: MEDICATIONS  (STANDING):  ampicillin/sulbactam  IVPB 3 Gram(s) IV Intermittent every 24 hours  ampicillin/sulbactam  IVPB      aspirin enteric coated 81 milliGRAM(s) Oral daily  atorvastatin 40 milliGRAM(s) Oral at bedtime  chlorhexidine 4% Liquid 1 Application(s) Topical <User Schedule>  cloNIDine 0.1 milliGRAM(s) Oral two times a day  clopidogrel Tablet 75 milliGRAM(s) Oral daily  dextrose 5%. 1000 milliLiter(s) (50 mL/Hr) IV Continuous <Continuous>  dextrose 50% Injectable 25 Gram(s) IV Push once  epoetin fawad-epbx (RETACRIT) Injectable 75318 Unit(s) IV Push <User Schedule>  glucagon  Injectable 1 milliGRAM(s) IntraMuscular once  heparin   Injectable 5000 Unit(s) SubCutaneous every 12 hours  imipramine 50 milliGRAM(s) Oral daily  insulin glargine Injectable (LANTUS) 12 Unit(s) SubCutaneous at bedtime  insulin lispro (ADMELOG) corrective regimen sliding scale   SubCutaneous three times a day before meals  insulin lispro (ADMELOG) corrective regimen sliding scale   SubCutaneous at bedtime  metoprolol tartrate 100 milliGRAM(s) Oral every 12 hours  Nephro-elvie 1 Tablet(s) Oral daily  pantoprazole    Tablet 40 milliGRAM(s) Oral before breakfast  risperiDONE   Tablet 0.5 milliGRAM(s) Oral two times a day  zinc sulfate 220 milliGRAM(s) Oral daily    MEDICATIONS  (PRN):  acetaminophen     Tablet .. 650 milliGRAM(s) Oral every 6 hours PRN Temp greater or equal to 38C (100.4F), Mild Pain (1 - 3)  aluminum hydroxide/magnesium hydroxide/simethicone Suspension 30 milliLiter(s) Oral every 4 hours PRN Dyspepsia  dextrose Oral Gel 15 Gram(s) Oral once PRN Blood Glucose LESS THAN 70 milliGRAM(s)/deciliter  melatonin 3 milliGRAM(s) Oral at bedtime PRN Insomnia  OLANZapine Injectable 2.5 milliGRAM(s) IntraMuscular every 6 hours PRN Acute agitation    Pertinent Labs: 07-07 Na139 mmol/L Glu 158 mg/dL<H> K+ 3.8 mmol/L Cr  3.50 mg/dL<H> BUN 26 mg/dL<H> 07-04 Phos 4.0 mg/dL 07-01 Alb 2.1 g/dL<L>     CAPILLARY BLOOD GLUCOSE      POCT Blood Glucose.: 157 mg/dL (07 Jul 2022 08:37)  POCT Blood Glucose.: 233 mg/dL (06 Jul 2022 21:00)  POCT Blood Glucose.: 287 mg/dL (06 Jul 2022 17:04)  POCT Blood Glucose.: 237 mg/dL (06 Jul 2022 13:56)  POCT Blood Glucose.: 160 mg/dL (06 Jul 2022 12:24)    Skin: stage IV L medial malleolus, stage II coccyx surrounded by stage I     Estimated Needs:   [ X] no change since previous assessment  [ ] recalculated:     Previous Nutrition Diagnosis:   [ ] Inadequate Energy Intake [ ]Inadequate Oral Intake [ ] Excessive Energy Intake   [ ] Underweight [ ] Increased Nutrient Needs [ ] Overweight/Obesity   [ ] Altered GI Function [ ] Unintended Weight Loss [ ] Food & Nutrition Related Knowledge Deficit [ X] Malnutrition     Nutrition Diagnosis is [ X] ongoing  [ ] resolved [ ] not applicable     New Nutrition Diagnosis: [ ] not applicable       Interventions:   Recommend  [ ] Change Diet To:  [ ] Nutrition Supplement  [ ] Nutrition Support  [X ] Other: cont POC, feeding assistance and encouragement     Monitoring and Evaluation:   [X ] PO intake [ x ] Tolerance to diet prescription [ x ] weights [ x ] labs[ x ] follow up per protocol  [ ] other:

## 2022-07-07 NOTE — PROGRESS NOTE ADULT - ATTENDING COMMENTS
70 yo woman with Hx ERSD, PVD, CAD, CABG admitted 6/16 with R hallux gangrene requiring R fem-pop bypass and partial ray amputation.  Course complicated by episode of junctional bradycardia (likely med related), resulting in cardiogenic shock and encephalopathy with transfer to ICU, later resolved and transferred to  telemetry, hospital course complicated by  Afib with RVR, encephalopathy. PT suggested MEGHAN. On Unasyn, ID suggested Vancomycin with HD upon discharge. Nephro Dr. Young made aware. Plan as above, d/w residents
72 yo woman with Hx ERSD, PVD, CAD, CABG admitted 6/16 with R hallux gangrene requiring R fem-pop bypass and partial ray amputation.  Course complicated by episode of junctional bradycardia (likely med related), resulting in cardiogenic shock and encephalopathy with transfer to ICU, later resolved and transferred to  telemetry, hospital course complicated by  Afib with RVR, encephalopathy.     Patient seen and examined at bedside. Patient AAOx3 on my exam today. States she feels well, just complains of generalized weakness. PT recommending MEGHAN, but patient states she would like to go home. Tried to explain to patient it is not safe for her to go home, patient states she would like to speak to her family. Discussed with SW, will reach out to family to get choices.
Agree with above. Has intact pulses in R PT, s/p fem pop bypass POD #1. Plan for OR tomorrow for amputation. Medically optimized. Continue Ceftriaxone for history of Klebsiella oxytoca/Raoutella oxytoca, E. coli, and MSSA wound infection. Remains HD stable. On cardiac regimen of aspirin, clopidogrel, metoprolol, diltiazem, amlodipine, and clonidine. Stable respiratory status. Has ESRD on dialysis, next due tomorrow. Continue Renal diet, NPO after midnight for OR. DVT ppx with HSQ. Continue Lantus and insulin coverage scale for DM2. Stable for transfer to floors with remote tele. Discussed with patient at bedside.
70 yo woman with Hx ERSD, PVD, CAD, CABG admitted 6/16 with R hallux gangrene requiring R fem-pop bypass and partial ray amputation.  Course complicated by episode of junctional bradycardia (likely med related), resulting in cardiogenic shock and encephalopathy with transfer to ICU, later resolved and transferred to  telemetry, hospital course complicated by  Afib with RVR, encephalopathy.     Patient seen and examined at bedside. Patient AAOx3 on my exam today. Patient to be discharged to [facility]. SW updated family. Please refer to updated d/c note for further details.
72 yo woman with Hx ERSD, PVD, CAD, CABG admitted 6/16 with R hallux gangrene requiring R fem-pop bypass and partial ray amputation.  Course complicated by episode of junctional bradycardia (likely med related), resulting in cardiogenic shock and encephalopathy with transfer to ICU, later resolved and transferred to  telemetry, now Afib with RVR. S/p Cardizem drip for Afib with RVR, Cardio increased dose of Oral Metoprolol, heart rate improved but had Code Stroke this morning for altered mental status, patient received Zyprexa earlier in the morning for agitation. Per RN unable to preform dysphagia screen due to mental  status, unable to take po meds, changed to IV Lopressor for now. Speech and swallow evaluation pending. Neuro and Psych also  consulted. Plan as above, d/w team
72 yo woman with Hx ERSD, PVD, CAD, CABG admitted 6/16 with R hallux gangrene requiring R fem-pop bypass and partial ray amputation.  Course complicated by episode of junctional bradycardia (likely med related), resulting in cardiogenic shock and encephalopathy with transfer to ICU, later resolved and transferred to  telemetry, now Afib with RVR. S/p IV Lopressor, on Po metoprolol also. D/w Cardio Dr. James, recommended IV Cardizem 5mg X 1, started on Cardizem drip eventually as per recommendations by Cardio since heart rate still very high. ICU Consulted as well. Plan d/w residents, Cardio  multiple times today.
70 yo woman with Hx ERSD, PVD, CAD, CABG admitted 6/16 with R hallux gangrene requiring R fem-pop bypass and partial ray amputation.  Course complicated by   episode of junctional bradycardia (likely med related), resulting in cardiogenic shock and encephalopathy.  Now resolved.      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 fecalis  continue unasyn  --DM2, hypoglycemic this am, will decrease lantus to 7 units and d/c premeal insulin, cont ISS  --plan discussed with pt  --stable for tele  --discussed with cardiology
Patient seen and examined at bedside. Plan as above, d/w residents. Altered mental status suspect multifactorial, hospital acquired delirium and acute encephalopathy. HD per schedule. On Cefepime.
Patient seen and examined at bedside. Plan as above, d/w residents. Altered mental status suspect multifactorial, hospital acquired delirium and acute encephalopathy. HD per schedule. On Cefepime.
Agree with above findings and plan

## 2022-07-07 NOTE — PROGRESS NOTE ADULT - REASON FOR ADMISSION
SIRS

## 2022-07-07 NOTE — PROGRESS NOTE ADULT - ASSESSMENT
pt with hypoglycemia  elevated troponin due to renal failure  ashd , s/p mi  s/p coronary stent  s/p cabg  chf-hfref  esrd - on hd  dm2  hypertension  paf -not on oac - fall risk  pvd - s/p stent  dyslipidemia   chf - compensated - recent coronary angiogram - patent graft -pt's cradiac status stable low  to intermediate risk for  pvd surgery and amputation of great  toe if needed  6/21  pt tolerated rt femoral -poplitael by-pass 6/21  cardiac status stable low risk for rt big toe amputation 6/23  pt tolerated amputation of left great toe 6/23  pt had bradycardia and hypotension- transferred to icu- had atropine and dopamine-metoprolol and cardizem dc 6/27  pt is in rsr rate 80/min - start po metoprolol-has had confusion

## 2022-07-07 NOTE — PROGRESS NOTE ADULT - NUTRITIONAL ASSESSMENT
MEDICATIONS  (STANDING):  ampicillin/sulbactam  IVPB 3 Gram(s) IV Intermittent every 24 hours  ampicillin/sulbactam  IVPB      aspirin enteric coated 81 milliGRAM(s) Oral daily  atorvastatin 40 milliGRAM(s) Oral at bedtime  chlorhexidine 4% Liquid 1 Application(s) Topical <User Schedule>  cloNIDine 0.1 milliGRAM(s) Oral two times a day  clopidogrel Tablet 75 milliGRAM(s) Oral daily  dextrose 5%. 1000 milliLiter(s) (50 mL/Hr) IV Continuous <Continuous>  dextrose 50% Injectable 25 Gram(s) IV Push once  epoetin fawad-epbx (RETACRIT) Injectable 92769 Unit(s) IV Push <User Schedule>  glucagon  Injectable 1 milliGRAM(s) IntraMuscular once  heparin   Injectable 5000 Unit(s) SubCutaneous every 12 hours  imipramine 50 milliGRAM(s) Oral daily  insulin glargine Injectable (LANTUS) 8 Unit(s) SubCutaneous at bedtime  insulin lispro (ADMELOG) corrective regimen sliding scale   SubCutaneous three times a day before meals  insulin lispro (ADMELOG) corrective regimen sliding scale   SubCutaneous at bedtime  metoprolol tartrate 100 milliGRAM(s) Oral every 12 hours  pantoprazole    Tablet 40 milliGRAM(s) Oral before breakfast  risperiDONE   Tablet 0.5 milliGRAM(s) Oral two times a day
MEDICATIONS  (STANDING):  aspirin enteric coated 81 milliGRAM(s) Oral daily  atorvastatin 40 milliGRAM(s) Oral at bedtime  calcium acetate 667 milliGRAM(s) Oral three times a day with meals  cefTRIAXone   IVPB 2000 milliGRAM(s) IV Intermittent every 24 hours  chlorhexidine 4% Liquid 1 Application(s) Topical <User Schedule>  cloNIDine 0.1 milliGRAM(s) Oral two times a day  clopidogrel Tablet 75 milliGRAM(s) Oral daily  dextrose 5%. 1000 milliLiter(s) (50 mL/Hr) IV Continuous <Continuous>  dextrose 50% Injectable 25 Gram(s) IV Push once  diltiazem    Tablet 60 milliGRAM(s) Oral every 8 hours  glucagon  Injectable 1 milliGRAM(s) IntraMuscular once  heparin   Injectable 5000 Unit(s) SubCutaneous every 12 hours  imipramine 50 milliGRAM(s) Oral daily  insulin glargine Injectable (LANTUS) 15 Unit(s) SubCutaneous at bedtime  insulin lispro (ADMELOG) corrective regimen sliding scale   SubCutaneous three times a day before meals  insulin lispro Injectable (ADMELOG) 3 Unit(s) SubCutaneous three times a day before meals  metoprolol tartrate 100 milliGRAM(s) Oral every 12 hours  pantoprazole    Tablet 40 milliGRAM(s) Oral before breakfast
MEDICATIONS  (STANDING):  aspirin enteric coated 81 milliGRAM(s) Oral daily  atorvastatin 40 milliGRAM(s) Oral at bedtime  calcium acetate 667 milliGRAM(s) Oral three times a day with meals  cefTRIAXone   IVPB 2000 milliGRAM(s) IV Intermittent every 24 hours  chlorhexidine 4% Liquid 1 Application(s) Topical <User Schedule>  cloNIDine 0.1 milliGRAM(s) Oral two times a day  clopidogrel Tablet 75 milliGRAM(s) Oral daily  dextrose 5%. 1000 milliLiter(s) (50 mL/Hr) IV Continuous <Continuous>  dextrose 50% Injectable 25 Gram(s) IV Push once  diltiazem    Tablet 60 milliGRAM(s) Oral every 8 hours  glucagon  Injectable 1 milliGRAM(s) IntraMuscular once  heparin   Injectable 5000 Unit(s) SubCutaneous every 12 hours  imipramine 50 milliGRAM(s) Oral daily  insulin glargine Injectable (LANTUS) 15 Unit(s) SubCutaneous at bedtime  insulin lispro (ADMELOG) corrective regimen sliding scale   SubCutaneous three times a day before meals  insulin lispro Injectable (ADMELOG) 3 Unit(s) SubCutaneous three times a day before meals  metoprolol tartrate 100 milliGRAM(s) Oral every 12 hours  pantoprazole    Tablet 40 milliGRAM(s) Oral before breakfast
MEDICATIONS  (STANDING):  aspirin enteric coated 81 milliGRAM(s) Oral daily  atorvastatin 40 milliGRAM(s) Oral at bedtime  calcium acetate 667 milliGRAM(s) Oral three times a day with meals  cefTRIAXone   IVPB 2000 milliGRAM(s) IV Intermittent every 24 hours  chlorhexidine 4% Liquid 1 Application(s) Topical <User Schedule>  cloNIDine 0.1 milliGRAM(s) Oral two times a day  clopidogrel Tablet 75 milliGRAM(s) Oral daily  dextrose 5%. 1000 milliLiter(s) (50 mL/Hr) IV Continuous <Continuous>  dextrose 50% Injectable 25 Gram(s) IV Push once  diltiazem    Tablet 60 milliGRAM(s) Oral every 8 hours  glucagon  Injectable 1 milliGRAM(s) IntraMuscular once  heparin   Injectable 5000 Unit(s) SubCutaneous every 12 hours  imipramine 50 milliGRAM(s) Oral daily  insulin glargine Injectable (LANTUS) 8 Unit(s) SubCutaneous at bedtime  insulin lispro (ADMELOG) corrective regimen sliding scale   SubCutaneous three times a day before meals  metoprolol tartrate 100 milliGRAM(s) Oral every 12 hours  pantoprazole    Tablet 40 milliGRAM(s) Oral before breakfast
MEDICATIONS  (STANDING):  aspirin enteric coated 81 milliGRAM(s) Oral daily  atorvastatin 40 milliGRAM(s) Oral at bedtime  calcium acetate 667 milliGRAM(s) Oral three times a day with meals  chlorhexidine 4% Liquid 1 Application(s) Topical <User Schedule>  cloNIDine 0.1 milliGRAM(s) Oral two times a day  clopidogrel Tablet 75 milliGRAM(s) Oral daily  dextrose 5%. 1000 milliLiter(s) (50 mL/Hr) IV Continuous <Continuous>  dextrose 50% Injectable 25 Gram(s) IV Push once  diltiazem    Tablet 60 milliGRAM(s) Oral every 8 hours  glucagon  Injectable 1 milliGRAM(s) IntraMuscular once  heparin   Injectable 5000 Unit(s) SubCutaneous every 12 hours  imipramine 50 milliGRAM(s) Oral daily  insulin glargine Injectable (LANTUS) 8 Unit(s) SubCutaneous at bedtime  insulin lispro (ADMELOG) corrective regimen sliding scale   SubCutaneous three times a day before meals  metoprolol tartrate 100 milliGRAM(s) Oral every 12 hours  pantoprazole    Tablet 40 milliGRAM(s) Oral before breakfast
MEDICATIONS  (STANDING):  aspirin enteric coated 81 milliGRAM(s) Oral daily  atorvastatin 40 milliGRAM(s) Oral at bedtime  calcium acetate 667 milliGRAM(s) Oral three times a day with meals  chlorhexidine 4% Liquid 1 Application(s) Topical <User Schedule>  cloNIDine 0.1 milliGRAM(s) Oral two times a day  clopidogrel Tablet 75 milliGRAM(s) Oral daily  dextrose 5%. 1000 milliLiter(s) (50 mL/Hr) IV Continuous <Continuous>  dextrose 50% Injectable 25 Gram(s) IV Push once  diltiazem    Tablet 60 milliGRAM(s) Oral every 8 hours  glucagon  Injectable 1 milliGRAM(s) IntraMuscular once  heparin   Injectable 5000 Unit(s) SubCutaneous every 12 hours  imipramine 50 milliGRAM(s) Oral daily  insulin glargine Injectable (LANTUS) 8 Unit(s) SubCutaneous at bedtime  insulin lispro (ADMELOG) corrective regimen sliding scale   SubCutaneous three times a day before meals  metoprolol tartrate 100 milliGRAM(s) Oral every 12 hours  pantoprazole    Tablet 40 milliGRAM(s) Oral before breakfast
This patient has been assessed with a concern for Malnutrition and has been determined to have a diagnosis/diagnoses of Severe protein-calorie malnutrition and Underweight (BMI < 19).      The following pending diet order is being considered for treatment of Severe protein-calorie malnutrition and Underweight (BMI < 19):  Diet Consistent Carbohydrate Renal w/Evening Snack-  Abel(7 Gm Arginine/7 Gm Glut/1.2 Gm HMB     Qty per Day:  1  No Carb Prosource (1pkg = 15gms Protein)     Qty per Day:  2  Supplement Feeding Modality:  Oral  Nepro Cans or Servings Per Day:  1       Frequency:  Two Times a day  Entered: Jun 28 2022 10:41AM  
This patient has been assessed with a concern for Malnutrition and has been determined to have a diagnosis/diagnoses of Severe protein-calorie malnutrition and Underweight (BMI < 19).    This patient is being managed with:   Diet Consistent Carbohydrate Renal w/Evening Snack-  Supplement Feeding Modality:  Oral  Nepro Cans or Servings Per Day:  1       Frequency:  Two Times a day  Entered: Jun 23 2022  5:52PM    
This patient has been assessed with a concern for Malnutrition and has been determined to have a diagnosis/diagnoses of Severe protein-calorie malnutrition and Underweight (BMI < 19).    This patient is being managed with:   Diet NPO-     Special Instructions for Nursing:  CODE STROKE; NPO until Dysphagia screen or Speech Bedside Swallow Evaluation completed and passed  Entered: Jun 30 2022  8:05AM    
This patient has been assessed with a concern for Malnutrition and has been determined to have a diagnosis/diagnoses of Severe protein-calorie malnutrition and Underweight (BMI < 19).    This patient is being managed with:   Diet Soft and Bite Sized-  Consistent Carbohydrate {No Snacks}  DASH/TLC {Sodium & Cholesterol Restricted}  Mildly Thick Liquids (MILDTHICKLIQS)  For patients receiving Renal Replacement - No Protein Restr No Conc K No Conc Phos Low Sodium (RENAL)  Entered: Jul 1 2022  9:37AM    
MEDICATIONS  (STANDING):  amLODIPine   Tablet 5 milliGRAM(s) Oral daily  aspirin enteric coated 81 milliGRAM(s) Oral daily  atorvastatin 40 milliGRAM(s) Oral at bedtime  calcium acetate 667 milliGRAM(s) Oral three times a day with meals  cefTRIAXone   IVPB 1000 milliGRAM(s) IV Intermittent every 24 hours  cloNIDine 0.1 milliGRAM(s) Oral two times a day  clopidogrel Tablet 75 milliGRAM(s) Oral daily  dextrose 5%. 1000 milliLiter(s) (100 mL/Hr) IV Continuous <Continuous>  dextrose 5%. 1000 milliLiter(s) (50 mL/Hr) IV Continuous <Continuous>  dextrose 50% Injectable 25 Gram(s) IV Push once  dextrose 50% Injectable 12.5 Gram(s) IV Push once  dextrose 50% Injectable 25 Gram(s) IV Push once  diltiazem    Tablet 60 milliGRAM(s) Oral every 8 hours  glucagon  Injectable 1 milliGRAM(s) IntraMuscular once  heparin   Injectable 5000 Unit(s) SubCutaneous every 12 hours  imipramine 50 milliGRAM(s) Oral daily  insulin glargine Injectable (LANTUS) 8 Unit(s) SubCutaneous at bedtime  insulin lispro (ADMELOG) corrective regimen sliding scale   SubCutaneous three times a day before meals  insulin lispro (ADMELOG) corrective regimen sliding scale   SubCutaneous at bedtime  metoprolol tartrate 100 milliGRAM(s) Oral every 12 hours  pantoprazole    Tablet 40 milliGRAM(s) Oral before breakfast  povidone iodine 10% Solution 1 Application(s) Topical daily
MEDICATIONS  (STANDING):  aspirin enteric coated 81 milliGRAM(s) Oral daily  atorvastatin 40 milliGRAM(s) Oral at bedtime  calcium acetate 667 milliGRAM(s) Oral three times a day with meals  cefTRIAXone   IVPB 2000 milliGRAM(s) IV Intermittent every 24 hours  chlorhexidine 4% Liquid 1 Application(s) Topical <User Schedule>  cloNIDine 0.1 milliGRAM(s) Oral two times a day  clopidogrel Tablet 75 milliGRAM(s) Oral daily  dextrose 5%. 1000 milliLiter(s) (50 mL/Hr) IV Continuous <Continuous>  dextrose 50% Injectable 25 Gram(s) IV Push once  diltiazem    Tablet 60 milliGRAM(s) Oral every 8 hours  glucagon  Injectable 1 milliGRAM(s) IntraMuscular once  heparin   Injectable 5000 Unit(s) SubCutaneous every 12 hours  imipramine 50 milliGRAM(s) Oral daily  insulin glargine Injectable (LANTUS) 15 Unit(s) SubCutaneous at bedtime  insulin lispro (ADMELOG) corrective regimen sliding scale   SubCutaneous three times a day before meals  insulin lispro Injectable (ADMELOG) 3 Unit(s) SubCutaneous three times a day before meals  metoprolol tartrate 100 milliGRAM(s) Oral every 12 hours  pantoprazole    Tablet 40 milliGRAM(s) Oral before breakfast
MEDICATIONS  (STANDING):  aspirin enteric coated 81 milliGRAM(s) Oral daily  atorvastatin 40 milliGRAM(s) Oral at bedtime  calcium acetate 667 milliGRAM(s) Oral three times a day with meals  cefTRIAXone   IVPB 2000 milliGRAM(s) IV Intermittent every 24 hours  chlorhexidine 4% Liquid 1 Application(s) Topical <User Schedule>  cloNIDine 0.1 milliGRAM(s) Oral two times a day  clopidogrel Tablet 75 milliGRAM(s) Oral daily  dextrose 5%. 1000 milliLiter(s) (50 mL/Hr) IV Continuous <Continuous>  dextrose 50% Injectable 25 Gram(s) IV Push once  diltiazem    Tablet 60 milliGRAM(s) Oral every 8 hours  glucagon  Injectable 1 milliGRAM(s) IntraMuscular once  heparin   Injectable 5000 Unit(s) SubCutaneous every 12 hours  imipramine 50 milliGRAM(s) Oral daily  insulin glargine Injectable (LANTUS) 15 Unit(s) SubCutaneous at bedtime  insulin lispro (ADMELOG) corrective regimen sliding scale   SubCutaneous three times a day before meals  insulin lispro Injectable (ADMELOG) 3 Unit(s) SubCutaneous three times a day before meals  metoprolol tartrate 100 milliGRAM(s) Oral every 12 hours  pantoprazole    Tablet 40 milliGRAM(s) Oral before breakfast
This patient has been assessed with a concern for Malnutrition and has been determined to have a diagnosis/diagnoses of Severe protein-calorie malnutrition and Underweight (BMI < 19).    This patient is being managed with:   Diet Consistent Carbohydrate Renal w/Evening Snack-  Supplement Feeding Modality:  Oral  Nepro Cans or Servings Per Day:  1       Frequency:  Two Times a day  Entered: Jun 23 2022  5:52PM    
This patient has been assessed with a concern for Malnutrition and has been determined to have a diagnosis/diagnoses of Severe protein-calorie malnutrition and Underweight (BMI < 19).    This patient is being managed with:   Diet Soft and Bite Sized-  Consistent Carbohydrate {No Snacks}  DASH/TLC {Sodium & Cholesterol Restricted}  Mildly Thick Liquids (MILDTHICKLIQS)  For patients receiving Renal Replacement - No Protein Restr No Conc K No Conc Phos Low Sodium (RENAL)  Entered: Jul 1 2022  9:37AM    
MEDICATIONS  (STANDING):  amLODIPine   Tablet 5 milliGRAM(s) Oral daily  aspirin enteric coated 81 milliGRAM(s) Oral daily  atorvastatin 40 milliGRAM(s) Oral at bedtime  calcium acetate 667 milliGRAM(s) Oral three times a day with meals  cefTRIAXone   IVPB 1000 milliGRAM(s) IV Intermittent every 24 hours  cloNIDine 0.1 milliGRAM(s) Oral two times a day  clopidogrel Tablet 75 milliGRAM(s) Oral daily  dextrose 5%. 1000 milliLiter(s) (100 mL/Hr) IV Continuous <Continuous>  dextrose 5%. 1000 milliLiter(s) (50 mL/Hr) IV Continuous <Continuous>  dextrose 50% Injectable 25 Gram(s) IV Push once  dextrose 50% Injectable 12.5 Gram(s) IV Push once  dextrose 50% Injectable 25 Gram(s) IV Push once  diltiazem    Tablet 60 milliGRAM(s) Oral every 8 hours  glucagon  Injectable 1 milliGRAM(s) IntraMuscular once  heparin   Injectable 5000 Unit(s) SubCutaneous every 12 hours  imipramine 50 milliGRAM(s) Oral daily  insulin glargine Injectable (LANTUS) 12 Unit(s) SubCutaneous at bedtime  insulin lispro (ADMELOG) corrective regimen sliding scale   SubCutaneous three times a day before meals  insulin lispro (ADMELOG) corrective regimen sliding scale   SubCutaneous at bedtime  metoprolol tartrate 100 milliGRAM(s) Oral every 12 hours  pantoprazole    Tablet 40 milliGRAM(s) Oral before breakfast  povidone iodine 10% Solution 1 Application(s) Topical daily
MEDICATIONS  (STANDING):  amLODIPine   Tablet 5 milliGRAM(s) Oral daily  aspirin enteric coated 81 milliGRAM(s) Oral daily  atorvastatin 40 milliGRAM(s) Oral at bedtime  calcium acetate 667 milliGRAM(s) Oral three times a day with meals  cefTRIAXone   IVPB 1000 milliGRAM(s) IV Intermittent every 24 hours  cloNIDine 0.1 milliGRAM(s) Oral two times a day  clopidogrel Tablet 75 milliGRAM(s) Oral daily  dextrose 5%. 1000 milliLiter(s) (100 mL/Hr) IV Continuous <Continuous>  dextrose 5%. 1000 milliLiter(s) (50 mL/Hr) IV Continuous <Continuous>  dextrose 50% Injectable 25 Gram(s) IV Push once  dextrose 50% Injectable 12.5 Gram(s) IV Push once  dextrose 50% Injectable 25 Gram(s) IV Push once  diltiazem    Tablet 60 milliGRAM(s) Oral every 8 hours  glucagon  Injectable 1 milliGRAM(s) IntraMuscular once  heparin   Injectable 5000 Unit(s) SubCutaneous every 12 hours  imipramine 50 milliGRAM(s) Oral daily  insulin glargine Injectable (LANTUS) 8 Unit(s) SubCutaneous at bedtime  insulin lispro (ADMELOG) corrective regimen sliding scale   SubCutaneous three times a day before meals  insulin lispro (ADMELOG) corrective regimen sliding scale   SubCutaneous at bedtime  metoprolol tartrate 100 milliGRAM(s) Oral every 12 hours  pantoprazole    Tablet 40 milliGRAM(s) Oral before breakfast  povidone iodine 10% Solution 1 Application(s) Topical daily
This patient has been assessed with a concern for Malnutrition and has been determined to have a diagnosis/diagnoses of Severe protein-calorie malnutrition and Underweight (BMI < 19).    This patient is being managed with:   Diet Consistent Carbohydrate Renal w/Evening Snack-  Supplement Feeding Modality:  Oral  Nepro Cans or Servings Per Day:  1       Frequency:  Two Times a day  Entered: Jun 23 2022  5:52PM    
This patient has been assessed with a concern for Malnutrition and has been determined to have a diagnosis/diagnoses of Severe protein-calorie malnutrition and Underweight (BMI < 19).    This patient is being managed with:   Diet Renal Restrictions-  For patients receiving Renal Replacement - No Protein Restr No Conc K No Conc Phos Low Sodium  Consistent Carbohydrate {No Snacks}  Soft and Bite Sized (SOFTBTSZ)  Mildly Thick Liquids (MILDTHICKLIQS)  Bael(7 Gm Arginine/7 Gm Glut/1.2 Gm HMB     Qty per Day:  BID  Supplement Feeding Modality:  Oral  Nepro Cans or Servings Per Day:  1       Frequency:  Two Times a day  Entered: Jul 4 2022  2:45PM    
This patient has been assessed with a concern for Malnutrition and has been determined to have a diagnosis/diagnoses of Severe protein-calorie malnutrition and Underweight (BMI < 19).    This patient is being managed with:   Diet Soft and Bite Sized-  Consistent Carbohydrate {No Snacks}  DASH/TLC {Sodium & Cholesterol Restricted}  Mildly Thick Liquids (MILDTHICKLIQS)  For patients receiving Renal Replacement - No Protein Restr No Conc K No Conc Phos Low Sodium (RENAL)  Entered: Jul 1 2022  9:37AM    
MEDICATIONS  (STANDING):  acetaminophen     Tablet .. 1000 milliGRAM(s) Oral every 6 hours  amLODIPine   Tablet 5 milliGRAM(s) Oral daily  aspirin enteric coated 81 milliGRAM(s) Oral daily  atorvastatin 40 milliGRAM(s) Oral at bedtime  calcium acetate 667 milliGRAM(s) Oral three times a day with meals  cefTRIAXone   IVPB 1000 milliGRAM(s) IV Intermittent every 24 hours  chlorhexidine 4% Liquid 1 Application(s) Topical <User Schedule>  cloNIDine 0.1 milliGRAM(s) Oral two times a day  clopidogrel Tablet 75 milliGRAM(s) Oral daily  dextrose 5%. 1000 milliLiter(s) (50 mL/Hr) IV Continuous <Continuous>  dextrose 5%. 1000 milliLiter(s) (100 mL/Hr) IV Continuous <Continuous>  dextrose 50% Injectable 25 Gram(s) IV Push once  dextrose 50% Injectable 12.5 Gram(s) IV Push once  dextrose 50% Injectable 25 Gram(s) IV Push once  diltiazem    Tablet 60 milliGRAM(s) Oral every 8 hours  glucagon  Injectable 1 milliGRAM(s) IntraMuscular once  heparin   Injectable 5000 Unit(s) SubCutaneous every 12 hours  imipramine 50 milliGRAM(s) Oral daily  insulin glargine Injectable (LANTUS) 8 Unit(s) SubCutaneous at bedtime  insulin lispro (ADMELOG) corrective regimen sliding scale   SubCutaneous three times a day before meals  insulin lispro (ADMELOG) corrective regimen sliding scale   SubCutaneous at bedtime  metoprolol tartrate 100 milliGRAM(s) Oral every 12 hours  pantoprazole    Tablet 40 milliGRAM(s) Oral before breakfast  povidone iodine 10% Solution 1 Application(s) Topical daily  sodium chloride 0.9%. 1000 milliLiter(s) (50 mL/Hr) IV Continuous <Continuous>
MEDICATIONS  (STANDING):  amLODIPine   Tablet 5 milliGRAM(s) Oral daily  aspirin enteric coated 81 milliGRAM(s) Oral daily  atorvastatin 40 milliGRAM(s) Oral at bedtime  calcium acetate 667 milliGRAM(s) Oral three times a day with meals  cefTRIAXone   IVPB 1000 milliGRAM(s) IV Intermittent every 24 hours  cloNIDine 0.1 milliGRAM(s) Oral two times a day  clopidogrel Tablet 75 milliGRAM(s) Oral daily  dextrose 5%. 1000 milliLiter(s) (100 mL/Hr) IV Continuous <Continuous>  dextrose 5%. 1000 milliLiter(s) (50 mL/Hr) IV Continuous <Continuous>  dextrose 50% Injectable 25 Gram(s) IV Push once  dextrose 50% Injectable 12.5 Gram(s) IV Push once  dextrose 50% Injectable 25 Gram(s) IV Push once  diltiazem    Tablet 60 milliGRAM(s) Oral every 8 hours  glucagon  Injectable 1 milliGRAM(s) IntraMuscular once  heparin   Injectable 5000 Unit(s) SubCutaneous every 12 hours  imipramine 50 milliGRAM(s) Oral daily  insulin glargine Injectable (LANTUS) 8 Unit(s) SubCutaneous at bedtime  insulin lispro (ADMELOG) corrective regimen sliding scale   SubCutaneous three times a day before meals  insulin lispro (ADMELOG) corrective regimen sliding scale   SubCutaneous at bedtime  metoprolol tartrate 100 milliGRAM(s) Oral every 12 hours  pantoprazole    Tablet 40 milliGRAM(s) Oral before breakfast  povidone iodine 10% Solution 1 Application(s) Topical daily
MEDICATIONS  (STANDING):  ampicillin/sulbactam  IVPB 3 Gram(s) IV Intermittent every 24 hours  ampicillin/sulbactam  IVPB      aspirin enteric coated 81 milliGRAM(s) Oral daily  atorvastatin 40 milliGRAM(s) Oral at bedtime  chlorhexidine 4% Liquid 1 Application(s) Topical <User Schedule>  cloNIDine 0.1 milliGRAM(s) Oral two times a day  clopidogrel Tablet 75 milliGRAM(s) Oral daily  dextrose 5%. 1000 milliLiter(s) (50 mL/Hr) IV Continuous <Continuous>  dextrose 50% Injectable 25 Gram(s) IV Push once  epoetin fawad-epbx (RETACRIT) Injectable 12393 Unit(s) IV Push <User Schedule>  glucagon  Injectable 1 milliGRAM(s) IntraMuscular once  heparin   Injectable 5000 Unit(s) SubCutaneous every 12 hours  imipramine 50 milliGRAM(s) Oral daily  insulin glargine Injectable (LANTUS) 8 Unit(s) SubCutaneous at bedtime  insulin lispro (ADMELOG) corrective regimen sliding scale   SubCutaneous three times a day before meals  insulin lispro (ADMELOG) corrective regimen sliding scale   SubCutaneous at bedtime  metoprolol tartrate 100 milliGRAM(s) Oral every 12 hours  pantoprazole    Tablet 40 milliGRAM(s) Oral before breakfast  risperiDONE   Tablet 0.5 milliGRAM(s) Oral two times a day
MEDICATIONS  (STANDING):  ampicillin/sulbactam  IVPB 3 Gram(s) IV Intermittent every 24 hours  ampicillin/sulbactam  IVPB      aspirin enteric coated 81 milliGRAM(s) Oral daily  atorvastatin 40 milliGRAM(s) Oral at bedtime  chlorhexidine 4% Liquid 1 Application(s) Topical <User Schedule>  cloNIDine 0.1 milliGRAM(s) Oral two times a day  clopidogrel Tablet 75 milliGRAM(s) Oral daily  dextrose 5%. 1000 milliLiter(s) (50 mL/Hr) IV Continuous <Continuous>  dextrose 50% Injectable 25 Gram(s) IV Push once  epoetin fawad-epbx (RETACRIT) Injectable 36445 Unit(s) IV Push <User Schedule>  glucagon  Injectable 1 milliGRAM(s) IntraMuscular once  heparin   Injectable 5000 Unit(s) SubCutaneous every 12 hours  imipramine 50 milliGRAM(s) Oral daily  insulin glargine Injectable (LANTUS) 8 Unit(s) SubCutaneous at bedtime  insulin lispro (ADMELOG) corrective regimen sliding scale   SubCutaneous three times a day before meals  insulin lispro (ADMELOG) corrective regimen sliding scale   SubCutaneous at bedtime  metoprolol tartrate 100 milliGRAM(s) Oral every 12 hours  pantoprazole    Tablet 40 milliGRAM(s) Oral before breakfast  risperiDONE   Tablet 0.5 milliGRAM(s) Oral two times a day
MEDICATIONS  (STANDING):  ampicillin/sulbactam  IVPB 3 Gram(s) IV Intermittent every 24 hours  ampicillin/sulbactam  IVPB      aspirin enteric coated 81 milliGRAM(s) Oral daily  atorvastatin 40 milliGRAM(s) Oral at bedtime  chlorhexidine 4% Liquid 1 Application(s) Topical <User Schedule>  cloNIDine 0.1 milliGRAM(s) Oral two times a day  clopidogrel Tablet 75 milliGRAM(s) Oral daily  dextrose 5%. 1000 milliLiter(s) (50 mL/Hr) IV Continuous <Continuous>  dextrose 50% Injectable 25 Gram(s) IV Push once  epoetin fawad-epbx (RETACRIT) Injectable 41157 Unit(s) IV Push <User Schedule>  glucagon  Injectable 1 milliGRAM(s) IntraMuscular once  heparin   Injectable 5000 Unit(s) SubCutaneous every 12 hours  imipramine 50 milliGRAM(s) Oral daily  insulin glargine Injectable (LANTUS) 8 Unit(s) SubCutaneous at bedtime  insulin lispro (ADMELOG) corrective regimen sliding scale   SubCutaneous three times a day before meals  insulin lispro (ADMELOG) corrective regimen sliding scale   SubCutaneous at bedtime  metoprolol tartrate 100 milliGRAM(s) Oral every 12 hours  pantoprazole    Tablet 40 milliGRAM(s) Oral before breakfast  risperiDONE   Tablet 0.5 milliGRAM(s) Oral two times a day
MEDICATIONS  (STANDING):  ampicillin/sulbactam  IVPB 3 Gram(s) IV Intermittent every 24 hours  ampicillin/sulbactam  IVPB      aspirin enteric coated 81 milliGRAM(s) Oral daily  atorvastatin 40 milliGRAM(s) Oral at bedtime  chlorhexidine 4% Liquid 1 Application(s) Topical <User Schedule>  cloNIDine 0.1 milliGRAM(s) Oral two times a day  clopidogrel Tablet 75 milliGRAM(s) Oral daily  dextrose 5%. 1000 milliLiter(s) (50 mL/Hr) IV Continuous <Continuous>  dextrose 50% Injectable 25 Gram(s) IV Push once  epoetin fawad-epbx (RETACRIT) Injectable 58291 Unit(s) IV Push <User Schedule>  glucagon  Injectable 1 milliGRAM(s) IntraMuscular once  heparin   Injectable 5000 Unit(s) SubCutaneous every 12 hours  imipramine 50 milliGRAM(s) Oral daily  insulin glargine Injectable (LANTUS) 8 Unit(s) SubCutaneous at bedtime  insulin lispro (ADMELOG) corrective regimen sliding scale   SubCutaneous three times a day before meals  insulin lispro (ADMELOG) corrective regimen sliding scale   SubCutaneous at bedtime  metoprolol tartrate 100 milliGRAM(s) Oral every 12 hours  pantoprazole    Tablet 40 milliGRAM(s) Oral before breakfast  risperiDONE   Tablet 0.5 milliGRAM(s) Oral two times a day
MEDICATIONS  (STANDING):  aspirin enteric coated 81 milliGRAM(s) Oral daily  atorvastatin 40 milliGRAM(s) Oral at bedtime  calcium acetate 667 milliGRAM(s) Oral three times a day with meals  cefTRIAXone   IVPB 2000 milliGRAM(s) IV Intermittent every 24 hours  chlorhexidine 4% Liquid 1 Application(s) Topical <User Schedule>  cloNIDine 0.1 milliGRAM(s) Oral two times a day  clopidogrel Tablet 75 milliGRAM(s) Oral daily  dextrose 5%. 1000 milliLiter(s) (50 mL/Hr) IV Continuous <Continuous>  dextrose 50% Injectable 25 Gram(s) IV Push once  diltiazem    Tablet 60 milliGRAM(s) Oral every 8 hours  glucagon  Injectable 1 milliGRAM(s) IntraMuscular once  heparin   Injectable 5000 Unit(s) SubCutaneous every 12 hours  imipramine 50 milliGRAM(s) Oral daily  insulin glargine Injectable (LANTUS) 15 Unit(s) SubCutaneous at bedtime  insulin lispro (ADMELOG) corrective regimen sliding scale   SubCutaneous three times a day before meals  insulin lispro Injectable (ADMELOG) 3 Unit(s) SubCutaneous three times a day before meals  metoprolol tartrate 100 milliGRAM(s) Oral every 12 hours  pantoprazole    Tablet 40 milliGRAM(s) Oral before breakfast
This patient has been assessed with a concern for Malnutrition and has been determined to have a diagnosis/diagnoses of Severe protein-calorie malnutrition and Underweight (BMI < 19).    This patient is being managed with:   Diet Consistent Carbohydrate Renal w/Evening Snack-  Abel(7 Gm Arginine/7 Gm Glut/1.2 Gm HMB     Qty per Day:  1  No Carb Prosource (1pkg = 15gms Protein)     Qty per Day:  2  Supplement Feeding Modality:  Oral  Nepro Cans or Servings Per Day:  1       Frequency:  Two Times a day  Entered: Jun 28 2022 10:41AM    
This patient has been assessed with a concern for Malnutrition and has been determined to have a diagnosis/diagnoses of Severe protein-calorie malnutrition and Underweight (BMI < 19).    This patient is being managed with:   Diet Consistent Carbohydrate Renal w/Evening Snack-  Abel(7 Gm Arginine/7 Gm Glut/1.2 Gm HMB     Qty per Day:  1  No Carb Prosource (1pkg = 15gms Protein)     Qty per Day:  2  Supplement Feeding Modality:  Oral  Nepro Cans or Servings Per Day:  1       Frequency:  Two Times a day  Entered: Jun 28 2022 10:41AM    
This patient has been assessed with a concern for Malnutrition and has been determined to have a diagnosis/diagnoses of Severe protein-calorie malnutrition and Underweight (BMI < 19).    This patient is being managed with:   Diet Consistent Carbohydrate Renal w/Evening Snack-  Supplement Feeding Modality:  Oral  Nepro Cans or Servings Per Day:  1       Frequency:  Two Times a day  Entered: Jun 22 2022  2:19PM    Diet NPO after Midnight-     NPO Start Date: 22-Jun-2022   NPO Start Time: 23:59  Except Medications  Entered: Jun 22 2022  9:23AM    
This patient has been assessed with a concern for Malnutrition and has been determined to have a diagnosis/diagnoses of Severe protein-calorie malnutrition and Underweight (BMI < 19).    This patient is being managed with:   Diet Consistent Carbohydrate Renal w/Evening Snack-  Supplement Feeding Modality:  Oral  Nepro Cans or Servings Per Day:  1       Frequency:  Two Times a day  Entered: Jun 23 2022  5:52PM    
This patient has been assessed with a concern for Malnutrition and has been determined to have a diagnosis/diagnoses of Severe protein-calorie malnutrition and Underweight (BMI < 19).    This patient is being managed with:   Diet Consistent Carbohydrate Renal w/Evening Snack-  Supplement Feeding Modality:  Oral  Nepro Cans or Servings Per Day:  1       Frequency:  Two Times a day  Entered: Jun 23 2022  5:52PM    
This patient has been assessed with a concern for Malnutrition and has been determined to have a diagnosis/diagnoses of Severe protein-calorie malnutrition and Underweight (BMI < 19).    This patient is being managed with:   Diet Renal Restrictions-  For patients receiving Renal Replacement - No Protein Restr No Conc K No Conc Phos Low Sodium  Consistent Carbohydrate {No Snacks}  Soft and Bite Sized (SOFTBTSZ)  Mildly Thick Liquids (MILDTHICKLIQS)  Abel(7 Gm Arginine/7 Gm Glut/1.2 Gm HMB     Qty per Day:  BID  Supplement Feeding Modality:  Oral  Nepro Cans or Servings Per Day:  1       Frequency:  Two Times a day  Entered: Jul 4 2022  2:45PM    
This patient has been assessed with a concern for Malnutrition and has been determined to have a diagnosis/diagnoses of Severe protein-calorie malnutrition and Underweight (BMI < 19).    This patient is being managed with:   Diet Renal Restrictions-  For patients receiving Renal Replacement - No Protein Restr No Conc K No Conc Phos Low Sodium  Consistent Carbohydrate {No Snacks}  Soft and Bite Sized (SOFTBTSZ)  Mildly Thick Liquids (MILDTHICKLIQS)  Abel(7 Gm Arginine/7 Gm Glut/1.2 Gm HMB     Qty per Day:  BID  Supplement Feeding Modality:  Oral  Nepro Cans or Servings Per Day:  1       Frequency:  Two Times a day  Entered: Jul 4 2022  2:45PM    
This patient has been assessed with a concern for Malnutrition and has been determined to have a diagnosis/diagnoses of Severe protein-calorie malnutrition and Underweight (BMI < 19).    This patient is being managed with:   Diet Soft and Bite Sized-  Consistent Carbohydrate {No Snacks}  DASH/TLC {Sodium & Cholesterol Restricted}  Mildly Thick Liquids (MILDTHICKLIQS)  For patients receiving Renal Replacement - No Protein Restr No Conc K No Conc Phos Low Sodium (RENAL)  Entered: Jul 1 2022  9:37AM    
This patient has been assessed with a concern for Malnutrition and has been determined to have a diagnosis/diagnoses of Severe protein-calorie malnutrition and Underweight (BMI < 19).    This patient is being managed with:   Diet NPO after Midnight-     NPO Start Date: 22-Jun-2022   NPO Start Time: 23:59  Entered: Jun 22 2022  6:23PM    Diet Consistent Carbohydrate Renal w/Evening Snack-  Supplement Feeding Modality:  Oral  Nepro Cans or Servings Per Day:  1       Frequency:  Two Times a day  Entered: Jun 22 2022  2:19PM    Diet NPO after Midnight-     NPO Start Date: 22-Jun-2022   NPO Start Time: 23:59  Except Medications  Entered: Jun 22 2022  9:23AM

## 2022-07-07 NOTE — PROGRESS NOTE ADULT - PROBLEM SELECTOR PROBLEM 1
Hypoglycemia
Gangrene of toe of right foot
Hypoglycemia
Hypoglycemia
Gangrene of toe of right foot
Hypoglycemia
PVD (peripheral vascular disease)
Atherosclerotic PVD with ulceration
Gangrene of toe of right foot
Gangrene of toe of right foot
Hypoglycemia
Gangrene of toe of right foot
Gangrene of toe of right foot
Hypoglycemia
Gangrene of toe of right foot
Hypoglycemia
Hypoglycemia
Gangrene of toe of right foot

## 2022-07-07 NOTE — PROGRESS NOTE ADULT - ATTENDING SUPERVISION STATEMENT
Resident

## 2022-07-07 NOTE — PROGRESS NOTE ADULT - PROBLEM SELECTOR PROBLEM 2
T2DM (type 2 diabetes mellitus)

## 2022-07-07 NOTE — DISCHARGE NOTE NURSING/CASE MANAGEMENT/SOCIAL WORK - NSDCPEFALRISK_GEN_ALL_CORE
For information on Fall & Injury Prevention, visit: [email or web address] OR  [email or web address] OR  https://www.cdc.gov/steadi/patient.html

## 2022-07-07 NOTE — DISCHARGE NOTE NURSING/CASE MANAGEMENT/SOCIAL WORK - PATIENT PORTAL LINK FT
You can access the FollowMyHealth Patient Portal offered by [facility] by registering at the following website: [email or web address]. By joining [facility]’s FollowMyHealth portal, you will also be able to view your health information using other applications (apps) compatible with our system.

## 2022-07-07 NOTE — DISCHARGE NOTE NURSING/CASE MANAGEMENT/SOCIAL WORK - NSDCVIVACCINE_GEN_ALL_CORE_FT
Tdap; 27-Nov-2015 19:03; Bob Kahn (LEV); [employer]; [id number]; IntraMuscular; Deltoid Left.; 0.5 milliLiter(s); VIS (VIS Published: 09-May-2013, VIS Presented: 27-Nov-2015);

## 2022-07-07 NOTE — PROGRESS NOTE ADULT - SUBJECTIVE AND OBJECTIVE BOX
Patient is a 71y old  Female who presents with a chief complaint of SIRS (07 Jul 2022 07:41)      INTERVAL HPI/OVERNIGHT EVENTS: Pt was seen and examined at bedside. Pt resting comfortably in bed, AOx2, disoriented to place. NAD, denies, cp, sob, abd pain, n/v/d, fever, chills, n/v/d. No new complaints at this time. Pt is still on unasyn, awaiting MEGHAN placement with HD.     MEDICATIONS  (STANDING):  ampicillin/sulbactam  IVPB 3 Gram(s) IV Intermittent every 24 hours  ampicillin/sulbactam  IVPB      aspirin enteric coated 81 milliGRAM(s) Oral daily  atorvastatin 40 milliGRAM(s) Oral at bedtime  chlorhexidine 4% Liquid 1 Application(s) Topical <User Schedule>  cloNIDine 0.1 milliGRAM(s) Oral two times a day  clopidogrel Tablet 75 milliGRAM(s) Oral daily  dextrose 5%. 1000 milliLiter(s) (50 mL/Hr) IV Continuous <Continuous>  dextrose 50% Injectable 25 Gram(s) IV Push once  epoetin fawad-epbx (RETACRIT) Injectable 10694 Unit(s) IV Push <User Schedule>  glucagon  Injectable 1 milliGRAM(s) IntraMuscular once  heparin   Injectable 5000 Unit(s) SubCutaneous every 12 hours  imipramine 50 milliGRAM(s) Oral daily  insulin glargine Injectable (LANTUS) 12 Unit(s) SubCutaneous at bedtime  insulin lispro (ADMELOG) corrective regimen sliding scale   SubCutaneous three times a day before meals  insulin lispro (ADMELOG) corrective regimen sliding scale   SubCutaneous at bedtime  metoprolol tartrate 100 milliGRAM(s) Oral every 12 hours  Nephro-elvie 1 Tablet(s) Oral daily  pantoprazole    Tablet 40 milliGRAM(s) Oral before breakfast  risperiDONE   Tablet 0.5 milliGRAM(s) Oral two times a day  zinc sulfate 220 milliGRAM(s) Oral daily    MEDICATIONS  (PRN):  acetaminophen     Tablet .. 650 milliGRAM(s) Oral every 6 hours PRN Temp greater or equal to 38C (100.4F), Mild Pain (1 - 3)  aluminum hydroxide/magnesium hydroxide/simethicone Suspension 30 milliLiter(s) Oral every 4 hours PRN Dyspepsia  dextrose Oral Gel 15 Gram(s) Oral once PRN Blood Glucose LESS THAN 70 milliGRAM(s)/deciliter  melatonin 3 milliGRAM(s) Oral at bedtime PRN Insomnia  OLANZapine Injectable 2.5 milliGRAM(s) IntraMuscular every 6 hours PRN Acute agitation      Allergies    latex (Unknown)  No Known Drug Allergies    Intolerances        REVIEW OF SYSTEMS:  CONSTITUTIONAL: No fever or chills  HEENT:  No headache, no sore throat  RESPIRATORY: No cough, wheezing, or shortness of breath  CARDIOVASCULAR: No chest pain, palpitations  GASTROINTESTINAL: No abd pain, nausea, vomiting, or diarrhea  GENITOURINARY: No dysuria, frequency, or hematuria  NEUROLOGICAL: no focal weakness or dizziness  MUSCULOSKELETAL: no myalgias     Vital Signs Last 24 Hrs  T(C): 36.9 (07 Jul 2022 04:39), Max: 37.1 (06 Jul 2022 21:00)  T(F): 98.4 (07 Jul 2022 04:39), Max: 98.7 (06 Jul 2022 21:00)  HR: 110 (07 Jul 2022 04:39) (76 - 110)  BP: 142/80 (07 Jul 2022 04:39) (93/53 - 149/65)  BP(mean): --  RR: 19 (07 Jul 2022 04:39) (18 - 19)  SpO2: 93% (07 Jul 2022 04:39) (93% - 98%)    PHYSICAL EXAM:  GENERAL: NAD, AOx2  HEENT:  anicteric, moist mucous membranes  CHEST/LUNG:  CTA b/l, no rales, wheezes, or rhonchi  HEART:  RRR, S1, S2  ABDOMEN:  BS+, soft, nontender, nondistended  EXTREMITIES: no edema, cyanosis, or calf tenderness  NERVOUS SYSTEM: answers questions and follows commands appropriately    LABS:                        9.1    11.42 )-----------( 424      ( 07 Jul 2022 06:20 )             29.5     CBC Full  -  ( 07 Jul 2022 06:20 )  WBC Count : 11.42 K/uL  Hemoglobin : 9.1 g/dL  Hematocrit : 29.5 %  Platelet Count - Automated : 424 K/uL  Mean Cell Volume : 93.4 fl  Mean Cell Hemoglobin : 28.8 pg  Mean Cell Hemoglobin Concentration : 30.8 gm/dL  Auto Neutrophil # : x  Auto Lymphocyte # : x  Auto Monocyte # : x  Auto Eosinophil # : x  Auto Basophil # : x  Auto Neutrophil % : x  Auto Lymphocyte % : x  Auto Monocyte % : x  Auto Eosinophil % : x  Auto Basophil % : x    07 Jul 2022 06:20    139    |  101    |  26     ----------------------------<  158    3.8     |  29     |  3.50     Ca    9.2        07 Jul 2022 06:20          CAPILLARY BLOOD GLUCOSE      POCT Blood Glucose.: 157 mg/dL (07 Jul 2022 08:37)  POCT Blood Glucose.: 233 mg/dL (06 Jul 2022 21:00)  POCT Blood Glucose.: 287 mg/dL (06 Jul 2022 17:04)  POCT Blood Glucose.: 237 mg/dL (06 Jul 2022 13:56)  POCT Blood Glucose.: 160 mg/dL (06 Jul 2022 12:24)          RADIOLOGY & ADDITIONAL TESTS:    Personally reviewed.     Consultant(s) Notes Reviewed:  [x] YES  [ ] NO

## 2022-07-07 NOTE — PROGRESS NOTE ADULT - SUBJECTIVE AND OBJECTIVE BOX
Neurology follow up note    SHILO [id number]      Interval History:    Patient feels ok no new complaints.    Allergies    latex (Unknown)  No Known Drug Allergies    Intolerances        MEDICATIONS    acetaminophen     Tablet .. 650 milliGRAM(s) Oral every 6 hours PRN  aluminum hydroxide/magnesium hydroxide/simethicone Suspension 30 milliLiter(s) Oral every 4 hours PRN  ampicillin/sulbactam  IVPB 3 Gram(s) IV Intermittent every 24 hours  ampicillin/sulbactam  IVPB      aspirin enteric coated 81 milliGRAM(s) Oral daily  atorvastatin 40 milliGRAM(s) Oral at bedtime  chlorhexidine 4% Liquid 1 Application(s) Topical <User Schedule>  cloNIDine 0.1 milliGRAM(s) Oral two times a day  clopidogrel Tablet 75 milliGRAM(s) Oral daily  dextrose 5%. 1000 milliLiter(s) IV Continuous <Continuous>  dextrose 50% Injectable 25 Gram(s) IV Push once  dextrose Oral Gel 15 Gram(s) Oral once PRN  epoetin fawad-epbx (RETACRIT) Injectable 43699 Unit(s) IV Push <User Schedule>  glucagon  Injectable 1 milliGRAM(s) IntraMuscular once  heparin   Injectable 5000 Unit(s) SubCutaneous every 12 hours  imipramine 50 milliGRAM(s) Oral daily  insulin glargine Injectable (LANTUS) 12 Unit(s) SubCutaneous at bedtime  insulin lispro (ADMELOG) corrective regimen sliding scale   SubCutaneous at bedtime  insulin lispro (ADMELOG) corrective regimen sliding scale   SubCutaneous three times a day before meals  melatonin 3 milliGRAM(s) Oral at bedtime PRN  metoprolol tartrate 100 milliGRAM(s) Oral every 12 hours  Nephro-elvie 1 Tablet(s) Oral daily  OLANZapine Injectable 2.5 milliGRAM(s) IntraMuscular every 6 hours PRN  pantoprazole    Tablet 40 milliGRAM(s) Oral before breakfast  risperiDONE   Tablet 0.5 milliGRAM(s) Oral two times a day  zinc sulfate 220 milliGRAM(s) Oral daily              Vital Signs Last 24 Hrs  T(C): 36.9 (07 Jul 2022 04:39), Max: 37.1 (06 Jul 2022 21:00)  T(F): 98.4 (07 Jul 2022 04:39), Max: 98.7 (06 Jul 2022 21:00)  HR: 110 (07 Jul 2022 04:39) (76 - 110)  BP: 142/80 (07 Jul 2022 04:39) (93/53 - 161/70)  BP(mean): --  RR: 19 (07 Jul 2022 04:39) (15 - 19)  SpO2: 93% (07 Jul 2022 04:39) (93% - 98%)      REVIEW OF SYSTEMS:  Slightly limited secondary to the patient being altered, but constitutionally, she denies fever, chills, or night sweats.  Head:  No headaches.  Eyes:  No double vision or blurry vision.  Ears:  No ringing in the ears.  Neck:  No neck pain.  Cardiovascular:  No chest pain.  Respiratory:  No shortness of breath.  Abdomen:  No nausea, vomiting, or abdominal pain.  Extremities/Neurological:  No numbness or tingling.  Musculoskeletal:  No joint pain.      PHYSICAL EXAMINATION:   HEENT:  Head:  Normocephalic, atraumatic.  Eyes:  No scleral icterus.  Ears:  Hearing appeared to be intact.  NECK:  Supple.  CARDIOVASCULAR:  S1 and S2 are heard.  RESPIRATORY:  Decreased breath sounds bilaterally, gives poor effort.  ABDOMEN:  Soft, nontender.  EXTREMITIES:  No clubbing or cyanosis was noted.      NEUROLOGIC:  The patient was arousable to verbal stimuli.  Location unknown, year was 2004, was able to name the number of fingers in each eye.  Pupils bilaterally were 2 mm, slightly reactive.  Speech was fluent.  Smile symmetric.  Motor, bilateral upper 4/5, bilateral lower 4-/5.  The patient, per previous note, does have significantly impaired proprioception, bilateral extremities, and knee jerks were trace, suspect this is from underlying diabetes.                 LABS:  CBC Full  -  ( 06 Jul 2022 06:10 )  WBC Count : 12.78 K/uL  RBC Count : 3.14 M/uL  Hemoglobin : 9.1 g/dL  Hematocrit : 29.1 %  Platelet Count - Automated : 505 K/uL  Mean Cell Volume : 92.7 fl  Mean Cell Hemoglobin : 29.0 pg  Mean Cell Hemoglobin Concentration : 31.3 gm/dL  Auto Neutrophil # : x  Auto Lymphocyte # : x  Auto Monocyte # : x  Auto Eosinophil # : x  Auto Basophil # : x  Auto Neutrophil % : x  Auto Lymphocyte % : x  Auto Monocyte % : x  Auto Eosinophil % : x  Auto Basophil % : x      07-06    138  |  98  |  39<H>  ----------------------------<  255<H>  4.2   |  29  |  5.20<H>    Ca    9.4      06 Jul 2022 06:10      Hemoglobin A1C:       Vitamin B12         RADIOLOGY        ANALYSIS AND PLAN:  This is a 71-year-old with episode of altered mental status.    For episode of altered mental status, suspect this is multifactorial secondary to underlying metabolic encephalopathy along with mild cognitive impairment and subtle dementia becoming more prominent in the hospital setting.  Examination was limited, but I see no clear focality to suggest a new cerebrovascular accident has ensued.  For history of ataxia and falls, most likely multifactorial secondary to age-related changes and neuropathy.  For history of diabetes, strict control of blood sugars.  For history of hypertension, monitor systolic blood pressure.  For hyperlipidemia, statin.  For mild cognitive impairment versus subtle dementia, for now supportive therapy will have episodes of on and off AMS  no new events     spoke to spouse, Geoffrey, at [phone] 7/1    Greater than 40 minutes of time was spent with the patient, plan of care, reviewing data, with greater than 50% of the visit was spent counseling and/or coordinating care with multidisciplinary healthcare team

## 2022-07-07 NOTE — PROGRESS NOTE ADULT - SUBJECTIVE AND OBJECTIVE BOX
72 y/o female seen at bedside. S/p R hallux amputation. Pt doing well, AAOx3. Pt resting in chair comfortably. Dressing to the right foot clean, dry, and intact.     MEDICATIONS  (STANDING):  ampicillin/sulbactam  IVPB 3 Gram(s) IV Intermittent every 24 hours  ampicillin/sulbactam  IVPB      aspirin enteric coated 81 milliGRAM(s) Oral daily  atorvastatin 40 milliGRAM(s) Oral at bedtime  chlorhexidine 4% Liquid 1 Application(s) Topical <User Schedule>  cloNIDine 0.1 milliGRAM(s) Oral two times a day  clopidogrel Tablet 75 milliGRAM(s) Oral daily  dextrose 5%. 1000 milliLiter(s) (50 mL/Hr) IV Continuous <Continuous>  dextrose 50% Injectable 25 Gram(s) IV Push once  epoetin fawad-epbx (RETACRIT) Injectable 85939 Unit(s) IV Push <User Schedule>  glucagon  Injectable 1 milliGRAM(s) IntraMuscular once  heparin   Injectable 5000 Unit(s) SubCutaneous every 12 hours  imipramine 50 milliGRAM(s) Oral daily  insulin glargine Injectable (LANTUS) 12 Unit(s) SubCutaneous at bedtime  insulin lispro (ADMELOG) corrective regimen sliding scale   SubCutaneous at bedtime  insulin lispro (ADMELOG) corrective regimen sliding scale   SubCutaneous three times a day before meals  metoprolol tartrate 100 milliGRAM(s) Oral every 12 hours  Nephro-elvie 1 Tablet(s) Oral daily  pantoprazole    Tablet 40 milliGRAM(s) Oral before breakfast  risperiDONE   Tablet 0.5 milliGRAM(s) Oral two times a day  zinc sulfate 220 milliGRAM(s) Oral daily    MEDICATIONS  (PRN):  acetaminophen     Tablet .. 650 milliGRAM(s) Oral every 6 hours PRN Temp greater or equal to 38C (100.4F), Mild Pain (1 - 3)  aluminum hydroxide/magnesium hydroxide/simethicone Suspension 30 milliLiter(s) Oral every 4 hours PRN Dyspepsia  dextrose Oral Gel 15 Gram(s) Oral once PRN Blood Glucose LESS THAN 70 milliGRAM(s)/deciliter  melatonin 3 milliGRAM(s) Oral at bedtime PRN Insomnia  OLANZapine Injectable 2.5 milliGRAM(s) IntraMuscular every 6 hours PRN Acute agitation    Vital Signs Last 24 Hrs  T(C): 36.9 (07 Jul 2022 04:39), Max: 37.1 (06 Jul 2022 21:00)  T(F): 98.4 (07 Jul 2022 04:39), Max: 98.7 (06 Jul 2022 21:00)  HR: 110 (07 Jul 2022 04:39) (83 - 110)  BP: 142/80 (07 Jul 2022 04:39) (136/68 - 149/65)  BP(mean): --  RR: 19 (07 Jul 2022 04:39) (18 - 19)  SpO2: 93% (07 Jul 2022 04:39) (93% - 94%)    CBC Full  -  ( 07 Jul 2022 06:20 )  WBC Count : 11.42 K/uL  RBC Count : 3.16 M/uL  Hemoglobin : 9.1 g/dL  Hematocrit : 29.5 %  Platelet Count - Automated : 424 K/uL  Mean Cell Volume : 93.4 fl  Mean Cell Hemoglobin : 28.8 pg  Mean Cell Hemoglobin Concentration : 30.8 gm/dL  Auto Neutrophil # : x  Auto Lymphocyte # : x  Auto Monocyte # : x  Auto Eosinophil # : x  Auto Basophil # : x  Auto Neutrophil % : x  Auto Lymphocyte % : x  Auto Monocyte % : x  Auto Eosinophil % : x  Auto Basophil % : x      Allergies    latex (Unknown)  No Known Drug Allergies    Intolerances      PHYSICAL EXAM:  Vascular: Right DP & PT dopplerable.  Capillary refill (CFT) 3 seconds. Digital hair absent bilaterally.   Neurological: Light touch sensation diminished bilaterally.  Musculoskeletal: s/p right hallux amputation. Strength in all quadrants bilaterally, AJ & STJ ROM absent b/l.   Dermatological: sutures to right hallux intact. No signs of wound dehiscence. No signs of infection.       Culture - Tissue with Gram Stain (collected 23 Jun 2022 15:35)  Source: .Tissue Other, right hallux bone culture  Gram Stain (24 Jun 2022 04:37):    Rare polymorphonuclear leukocytes seen per low power field    Few Gram positive cocci in pairs seen per oil power field        ACCESSION No:  [id number]  Patient:   SHILO KOHLER      Accession:                             [id number]    Collected Date/Time:                   6/23/2022 15:35 EDT  Received Date/Time:                    6/24/2022 07:44 EDT    Surgical Pathology Report - Auth (Verified)    Specimen(s) Submitted  1  Right hallux pathology  2  Right first metatarsal/clean margin pathology    Final Diagnosis  1. Right hallux  -Acute and chronic osteomyelitis.  -Gangrenous skin and soft tissue.    2. Right first metatarsal/clean margin:  -Minimal chronic osteomyelitis.    Verified by: Randall Rodriguez MD  (Electronic Signature)  Reported on: 06/27/22 12:18 EDT, [facility], [address] [place] [address]

## 2022-07-12 ENCOUNTER — EMERGENCY (EMERGENCY)
Facility: HOSPITAL | Age: 72
LOS: 1 days | Discharge: NURSING, REHAB OR LONG-TERM CARE | End: 2022-07-12
Attending: EMERGENCY MEDICINE | Admitting: EMERGENCY MEDICINE
Payer: MEDICARE

## 2022-07-12 VITALS
DIASTOLIC BLOOD PRESSURE: 65 MMHG | WEIGHT: 108.91 LBS | HEIGHT: 69 IN | SYSTOLIC BLOOD PRESSURE: 134 MMHG | RESPIRATION RATE: 20 BRPM | TEMPERATURE: 98 F | OXYGEN SATURATION: 100 % | HEART RATE: 82 BPM

## 2022-07-12 VITALS
OXYGEN SATURATION: 100 % | DIASTOLIC BLOOD PRESSURE: 69 MMHG | SYSTOLIC BLOOD PRESSURE: 137 MMHG | HEART RATE: 78 BPM | TEMPERATURE: 98 F | RESPIRATION RATE: 18 BRPM

## 2022-07-12 PROCEDURE — 72125 CT NECK SPINE W/O DYE: CPT | Mod: 26,MA

## 2022-07-12 PROCEDURE — 72125 CT NECK SPINE W/O DYE: CPT | Mod: MA

## 2022-07-12 PROCEDURE — 70450 CT HEAD/BRAIN W/O DYE: CPT | Mod: 26,MA

## 2022-07-12 PROCEDURE — 70450 CT HEAD/BRAIN W/O DYE: CPT | Mod: MA

## 2022-07-12 PROCEDURE — 99284 EMERGENCY DEPT VISIT MOD MDM: CPT | Mod: 25

## 2022-07-12 PROCEDURE — 99285 EMERGENCY DEPT VISIT HI MDM: CPT

## 2022-07-12 NOTE — ED PROVIDER NOTE - PROGRESS NOTE DETAILS
Reevaluated patient at bedside.  Patient feeling well.  Discussed the results of ct scans and copies of reports given.   An opportunity to ask questions was given.  Discussed the importance of prompt, close medical follow-up.  Patient will return with any changes, concerns or persistent / worsening symptoms.  Understanding of all instructions verbalized.

## 2022-07-12 NOTE — ED PROVIDER NOTE - CHPI ED SYMPTOMS NEG
no dizziness/no loss of consciousness/no nausea/no vomiting/no weakness/no change in level of consciousness

## 2022-07-12 NOTE — ED PROVIDER NOTE - CARE PROVIDER_API CALL
Diamond Larson (DO)  Internal Medicine  [address] [facility], [facility] [address]  [place] [address]  Phone: [phone]  Fax: [phone]  Follow Up Time: 1-3 Days

## 2022-07-12 NOTE — ED PROVIDER NOTE - OBJECTIVE STATEMENT
Patient brought in by EMS from rehab for CT head status post fall.  Patient relates that she was on the toilet was unable to get up and slid down to the ground.  Patient does not believe she hit her head, but because she is on antiplatelet agents she was sent for CT head.  Patient denies LOC headache neck or back pain arm or leg pain chest pain short of breath abdominal pain dizziness nausea vomiting focal weakness or numbness or any other complaints.  pmd - [facility] (outpt) [facility] (SNF)

## 2022-07-12 NOTE — ED PROVIDER NOTE - PATIENT PORTAL LINK FT
You can access the FollowMyHealth Patient Portal offered by [facility] by registering at the following website: [email or web address]. By joining [employer]’s FollowMyHealth portal, you will also be able to view your health information using other applications (apps) compatible with our system.

## 2022-07-12 NOTE — ED ADULT NURSE NOTE - OBJECTIVE STATEMENT
71 YOF A&OX3 with pmh of DM, frequent falls, ESRD, a-fib (on plavix), anemia brought in by EMS from [facility] s/p fall. pt was on toilet and slid down unable to get up from ground. pt denies LOC or pain. pt denies sob, chest pain, n/v/d, headaches, dizziness, blurry vision. safety maintained.

## 2022-07-12 NOTE — ED PROVIDER NOTE - SEVERITY
PAIN SCALE 0 OF 10.
F/U with PMD as noted. With any fevers, increased swelling, worsening redness, uncontrollable pain, inability to ambulate, lethargy, or any other concern, please return to ED. Continue with warm applications to site ~5x/day.

## 2022-07-12 NOTE — ED PROVIDER NOTE - CLINICAL SUMMARY MEDICAL DECISION MAKING FREE TEXT BOX
Recommended warm compresses twice daily. Patient brought in by EMS from skilled nursing facility/rehab for CT head due to fall while getting off the toilet this morning.  Patient denies any injuries or any complaints.    Plan CT

## 2022-07-12 NOTE — ED ADULT NURSE REASSESSMENT NOTE - NS ED NURSE REASSESS COMMENT FT1
spoke with transfer center arranged for pt to go back non-emerg; ETA next available. safety maintained.

## 2022-07-12 NOTE — ED PROVIDER NOTE - CPE EDP ENMT NORM
normal... Complex Repair And Burow's Graft Text: The defect edges were debeveled with a #15 scalpel blade.  The primary defect was closed partially with a complex linear closure.  Given the location of the defect, shape of the defect, the proximity to free margins and the presence of a standing cone deformity a Burow's graft was deemed most appropriate to repair the remaining defect.  The graft was trimmed to fit the size of the remaining defect.  The graft was then placed in the primary defect, oriented appropriately, and sutured into place.

## 2022-07-12 NOTE — ED ADULT NURSE REASSESSMENT NOTE - NS ED NURSE REASSESS COMMENT FT1
spoke with Jackeline from social work, pt able to go in non-emerg back to [facility] for discharge. safety maintained. spoke with Jackeline from social work, pt able to go in non-emerg back to [facility] for discharge. safety maintained.    confirmed with [facility] pt is resident of facility and informed of discharge.

## 2022-07-14 ENCOUNTER — INPATIENT (INPATIENT)
Facility: HOSPITAL | Age: 72
LOS: 4 days | Discharge: HOME | DRG: 564 | End: 2022-07-19
Attending: INTERNAL MEDICINE | Admitting: INTERNAL MEDICINE
Payer: MEDICARE

## 2022-07-14 VITALS
RESPIRATION RATE: 19 BRPM | TEMPERATURE: 98 F | HEART RATE: 78 BPM | DIASTOLIC BLOOD PRESSURE: 70 MMHG | OXYGEN SATURATION: 99 % | SYSTOLIC BLOOD PRESSURE: 136 MMHG | HEIGHT: 69 IN

## 2022-07-14 DIAGNOSIS — I10 ESSENTIAL (PRIMARY) HYPERTENSION: ICD-10-CM

## 2022-07-14 DIAGNOSIS — M86.10 OTHER ACUTE OSTEOMYELITIS, UNSPECIFIED SITE: ICD-10-CM

## 2022-07-14 DIAGNOSIS — Z29.9 ENCOUNTER FOR PROPHYLACTIC MEASURES, UNSPECIFIED: ICD-10-CM

## 2022-07-14 DIAGNOSIS — N18.6 END STAGE RENAL DISEASE: ICD-10-CM

## 2022-07-14 DIAGNOSIS — Z89.411 ACQUIRED ABSENCE OF RIGHT GREAT TOE: Chronic | ICD-10-CM

## 2022-07-14 DIAGNOSIS — L03.115 CELLULITIS OF RIGHT LOWER LIMB: ICD-10-CM

## 2022-07-14 DIAGNOSIS — L03.90 CELLULITIS, UNSPECIFIED: ICD-10-CM

## 2022-07-14 DIAGNOSIS — E11.9 TYPE 2 DIABETES MELLITUS WITHOUT COMPLICATIONS: ICD-10-CM

## 2022-07-14 LAB
ALBUMIN SERPL ELPH-MCNC: 2.8 G/DL — LOW (ref 3.3–5)
ALP SERPL-CCNC: 120 U/L — SIGNIFICANT CHANGE UP (ref 40–120)
ALT FLD-CCNC: 17 U/L — SIGNIFICANT CHANGE UP (ref 12–78)
ANION GAP SERPL CALC-SCNC: 6 MMOL/L — SIGNIFICANT CHANGE UP (ref 5–17)
APTT BLD: 27.8 SEC — SIGNIFICANT CHANGE UP (ref 27.5–35.5)
AST SERPL-CCNC: 24 U/L — SIGNIFICANT CHANGE UP (ref 15–37)
BASOPHILS # BLD AUTO: 0.09 K/UL — SIGNIFICANT CHANGE UP (ref 0–0.2)
BASOPHILS NFR BLD AUTO: 1 % — SIGNIFICANT CHANGE UP (ref 0–2)
BILIRUB SERPL-MCNC: 0.3 MG/DL — SIGNIFICANT CHANGE UP (ref 0.2–1.2)
BLD GP AB SCN SERPL QL: SIGNIFICANT CHANGE UP
BUN SERPL-MCNC: 40 MG/DL — HIGH (ref 7–23)
CALCIUM SERPL-MCNC: 9.9 MG/DL — SIGNIFICANT CHANGE UP (ref 8.5–10.1)
CHLORIDE SERPL-SCNC: 96 MMOL/L — SIGNIFICANT CHANGE UP (ref 96–108)
CO2 SERPL-SCNC: 36 MMOL/L — HIGH (ref 22–31)
CREAT SERPL-MCNC: 5.2 MG/DL — HIGH (ref 0.5–1.3)
EGFR: 8 ML/MIN/1.73M2 — LOW
EOSINOPHIL # BLD AUTO: 0.29 K/UL — SIGNIFICANT CHANGE UP (ref 0–0.5)
EOSINOPHIL NFR BLD AUTO: 3.2 % — SIGNIFICANT CHANGE UP (ref 0–6)
ERYTHROCYTE [SEDIMENTATION RATE] IN BLOOD: 39 MM/HR — HIGH (ref 0–20)
GLUCOSE SERPL-MCNC: 153 MG/DL — HIGH (ref 70–99)
HCT VFR BLD CALC: 34.2 % — LOW (ref 34.5–45)
HGB BLD-MCNC: 10.2 G/DL — LOW (ref 11.5–15.5)
IMM GRANULOCYTES NFR BLD AUTO: 1 % — SIGNIFICANT CHANGE UP (ref 0–1.5)
INR BLD: 0.92 RATIO — SIGNIFICANT CHANGE UP (ref 0.88–1.16)
LACTATE SERPL-SCNC: 1.3 MMOL/L — SIGNIFICANT CHANGE UP (ref 0.7–2)
LYMPHOCYTES # BLD AUTO: 1.27 K/UL — SIGNIFICANT CHANGE UP (ref 1–3.3)
LYMPHOCYTES # BLD AUTO: 14 % — SIGNIFICANT CHANGE UP (ref 13–44)
MCHC RBC-ENTMCNC: 28.4 PG — SIGNIFICANT CHANGE UP (ref 27–34)
MCHC RBC-ENTMCNC: 29.8 GM/DL — LOW (ref 32–36)
MCV RBC AUTO: 95.3 FL — SIGNIFICANT CHANGE UP (ref 80–100)
MONOCYTES # BLD AUTO: 1.36 K/UL — HIGH (ref 0–0.9)
MONOCYTES NFR BLD AUTO: 15 % — HIGH (ref 2–14)
NEUTROPHILS # BLD AUTO: 5.98 K/UL — SIGNIFICANT CHANGE UP (ref 1.8–7.4)
NEUTROPHILS NFR BLD AUTO: 65.8 % — SIGNIFICANT CHANGE UP (ref 43–77)
NRBC # BLD: 0 /100 WBCS — SIGNIFICANT CHANGE UP (ref 0–0)
PLATELET # BLD AUTO: 442 K/UL — HIGH (ref 150–400)
POTASSIUM SERPL-MCNC: 3.3 MMOL/L — LOW (ref 3.5–5.3)
POTASSIUM SERPL-SCNC: 3.3 MMOL/L — LOW (ref 3.5–5.3)
PROT SERPL-MCNC: 7.1 G/DL — SIGNIFICANT CHANGE UP (ref 6–8.3)
PROTHROM AB SERPL-ACNC: 10.7 SEC — SIGNIFICANT CHANGE UP (ref 10.5–13.4)
RBC # BLD: 3.59 M/UL — LOW (ref 3.8–5.2)
RBC # FLD: 18.9 % — HIGH (ref 10.3–14.5)
SARS-COV-2 RNA SPEC QL NAA+PROBE: SIGNIFICANT CHANGE UP
SODIUM SERPL-SCNC: 138 MMOL/L — SIGNIFICANT CHANGE UP (ref 135–145)
WBC # BLD: 9.08 K/UL — SIGNIFICANT CHANGE UP (ref 3.8–10.5)
WBC # FLD AUTO: 9.08 K/UL — SIGNIFICANT CHANGE UP (ref 3.8–10.5)

## 2022-07-14 PROCEDURE — 93010 ELECTROCARDIOGRAM REPORT: CPT

## 2022-07-14 PROCEDURE — 99284 EMERGENCY DEPT VISIT MOD MDM: CPT

## 2022-07-14 PROCEDURE — 73630 X-RAY EXAM OF FOOT: CPT | Mod: 26,RT

## 2022-07-14 PROCEDURE — 71045 X-RAY EXAM CHEST 1 VIEW: CPT | Mod: 26

## 2022-07-14 RX ORDER — RISPERIDONE 4 MG/1
0.5 TABLET ORAL
Refills: 0 | Status: DISCONTINUED | OUTPATIENT
Start: 2022-07-14 | End: 2022-07-19

## 2022-07-14 RX ORDER — LACTOBACILLUS ACIDOPHILUS 100MM CELL
1 CAPSULE ORAL
Refills: 0 | Status: DISCONTINUED | OUTPATIENT
Start: 2022-07-14 | End: 2022-07-19

## 2022-07-14 RX ORDER — ONDANSETRON 8 MG/1
4 TABLET, FILM COATED ORAL EVERY 8 HOURS
Refills: 0 | Status: DISCONTINUED | OUTPATIENT
Start: 2022-07-14 | End: 2022-07-19

## 2022-07-14 RX ORDER — SENNA PLUS 8.6 MG/1
2 TABLET ORAL AT BEDTIME
Refills: 0 | Status: DISCONTINUED | OUTPATIENT
Start: 2022-07-14 | End: 2022-07-19

## 2022-07-14 RX ORDER — ASPIRIN/CALCIUM CARB/MAGNESIUM 324 MG
81 TABLET ORAL DAILY
Refills: 0 | Status: DISCONTINUED | OUTPATIENT
Start: 2022-07-14 | End: 2022-07-19

## 2022-07-14 RX ORDER — LANOLIN ALCOHOL/MO/W.PET/CERES
3 CREAM (GRAM) TOPICAL AT BEDTIME
Refills: 0 | Status: DISCONTINUED | OUTPATIENT
Start: 2022-07-14 | End: 2022-07-19

## 2022-07-14 RX ORDER — CALCIUM ACETATE 667 MG
667 TABLET ORAL
Refills: 0 | Status: DISCONTINUED | OUTPATIENT
Start: 2022-07-14 | End: 2022-07-19

## 2022-07-14 RX ORDER — ATORVASTATIN CALCIUM 80 MG/1
40 TABLET, FILM COATED ORAL AT BEDTIME
Refills: 0 | Status: DISCONTINUED | OUTPATIENT
Start: 2022-07-14 | End: 2022-07-19

## 2022-07-14 RX ORDER — PANTOPRAZOLE SODIUM 20 MG/1
40 TABLET, DELAYED RELEASE ORAL
Refills: 0 | Status: DISCONTINUED | OUTPATIENT
Start: 2022-07-14 | End: 2022-07-19

## 2022-07-14 RX ORDER — TRAMADOL HYDROCHLORIDE 50 MG/1
50 TABLET ORAL THREE TIMES A DAY
Refills: 0 | Status: DISCONTINUED | OUTPATIENT
Start: 2022-07-14 | End: 2022-07-19

## 2022-07-14 RX ORDER — PIPERACILLIN AND TAZOBACTAM 4; .5 G/20ML; G/20ML
3.38 INJECTION, POWDER, LYOPHILIZED, FOR SOLUTION INTRAVENOUS EVERY 12 HOURS
Refills: 0 | Status: DISCONTINUED | OUTPATIENT
Start: 2022-07-15 | End: 2022-07-18

## 2022-07-14 RX ORDER — ACETAMINOPHEN 500 MG
650 TABLET ORAL EVERY 6 HOURS
Refills: 0 | Status: DISCONTINUED | OUTPATIENT
Start: 2022-07-14 | End: 2022-07-19

## 2022-07-14 RX ORDER — METOPROLOL TARTRATE 50 MG
100 TABLET ORAL EVERY 12 HOURS
Refills: 0 | Status: DISCONTINUED | OUTPATIENT
Start: 2022-07-14 | End: 2022-07-19

## 2022-07-14 RX ORDER — PIPERACILLIN AND TAZOBACTAM 4; .5 G/20ML; G/20ML
3.38 INJECTION, POWDER, LYOPHILIZED, FOR SOLUTION INTRAVENOUS ONCE
Refills: 0 | Status: COMPLETED | OUTPATIENT
Start: 2022-07-14 | End: 2022-07-14

## 2022-07-14 RX ORDER — ZINC SULFATE TAB 220 MG (50 MG ZINC EQUIVALENT) 220 (50 ZN) MG
220 TAB ORAL DAILY
Refills: 0 | Status: DISCONTINUED | OUTPATIENT
Start: 2022-07-14 | End: 2022-07-19

## 2022-07-14 RX ADMIN — ATORVASTATIN CALCIUM 40 MILLIGRAM(S): 80 TABLET, FILM COATED ORAL at 23:10

## 2022-07-14 RX ADMIN — PIPERACILLIN AND TAZOBACTAM 200 GRAM(S): 4; .5 INJECTION, POWDER, LYOPHILIZED, FOR SOLUTION INTRAVENOUS at 18:54

## 2022-07-14 RX ADMIN — SENNA PLUS 2 TABLET(S): 8.6 TABLET ORAL at 23:10

## 2022-07-14 NOTE — CONSULT NOTE ADULT - SUBJECTIVE AND OBJECTIVE BOX
Patient is a 71y Female whom presented to the hospital with esrd on hd     PAST MEDICAL & SURGICAL HISTORY:  Diabetes mellitus II      HTN (hypertension)      h/o Anxiety attack      Depression      h/o Myocardial infarct 2007      CAD (coronary artery disease)      h/o Hepatitis A 1969  currently resolved, no symptoms      PAD (peripheral artery disease)      Murmur, cardiac      h/o Smoking  quitted 3/2012      CRF (chronic renal failure), unspecified stage      Dialysis patient      Anemia secondary to renal failure      HTN (hypertension)      Osteomyelitis      ESRD on dialysis      Falls      Ataxia      coronary stent 2007      s/p Ovarian cyst removal      s/p surgical removal of benign Skin lesion epigastric area      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          MEDICATIONS  (STANDING):  aspirin enteric coated 81 milliGRAM(s) Oral daily  atorvastatin 40 milliGRAM(s) Oral at bedtime  calcium acetate 667 milliGRAM(s) Oral three times a day with meals  cloNIDine 0.1 milliGRAM(s) Oral two times a day  imipramine 50 milliGRAM(s) Oral daily  lactobacillus acidophilus 1 Tablet(s) Oral two times a day  metoprolol tartrate 100 milliGRAM(s) Oral every 12 hours  Nephro-elvie 1 Tablet(s) Oral daily  pantoprazole    Tablet 40 milliGRAM(s) Oral before breakfast  risperiDONE   Tablet 0.5 milliGRAM(s) Oral two times a day  senna 2 Tablet(s) Oral at bedtime  zinc sulfate 220 milliGRAM(s) Oral daily      Allergies    latex (Unknown)  No Known Drug Allergies    Intolerances        SOCIAL HISTORY:  Denies ETOh,Smoking,     FAMILY HISTORY:      REVIEW OF SYSTEMS:    CONSTITUTIONAL: No weakness, fevers or chills  RESPIRATORY: No cough, wheezing, hemoptysis; No shortness of breath  CARDIOVASCULAR: No chest pain or palpitations  GASTROINTESTINAL: No abdominal or epigastric pain. No nausea, vomiting,     No diarrhea or constipation. No melena   SKIN: dry      VITAL:  T(C): , Max: 36.8 (07-14-22 @ 16:07)  T(F): , Max: 98.2 (07-14-22 @ 16:07)  HR: 74 (07-14-22 @ 21:45)  BP: 137/72 (07-14-22 @ 21:45)  BP(mean): --  RR: 16 (07-14-22 @ 21:45)  SpO2: 100% (07-14-22 @ 21:45)  Wt(kg): --    I and O's:    Height (cm): 175.3 (07-14 @ 16:07)    PHYSICAL EXAM:    Constitutional: NAD  HEENT: conjunctive   clear   Neck:  No JVD  Respiratory: CTAB  Cardiovascular: S1 and S2  Gastrointestinal: BS+, soft, NT/ND  Extremities: right foot dressing     LABS:                        10.2   9.08  )-----------( 442      ( 14 Jul 2022 15:15 )             34.2     07-14    138  |  96  |  40<H>  ----------------------------<  153<H>  3.3<L>   |  36<H>  |  5.20<H>    Ca    9.9      14 Jul 2022 15:15    TPro  7.1  /  Alb  2.8<L>  /  TBili  0.3  /  DBili  x   /  AST  24  /  ALT  17  /  AlkPhos  120  07-14      Urine Studies:          RADIOLOGY & ADDITIONAL STUDIES:

## 2022-07-14 NOTE — H&P ADULT - PROBLEM SELECTOR PLAN 2
ESR 39 and CRP 14  MRSA PCR negative   6/21 s/p Femoral popliteal bypass with prosthetic graft  6/23 s/p R 1st toe ray amputation, Wound culture from OR with MSSA and enterococcus fecalis   Pathology showed OM in margines so need 6 weeks treatment from the start   Was on Unasyn to complete 6 weeks on 7/26.   No leukocytosis or fever on admission   Seen by ID CONS   Recommendations:  - Will follow blood cultures   - Wound culture has been sent x 2 , will follow   - Vascular surgery and podiatry consults   - Based on old cultures will continue zosyn or unasyn until wound culture is back

## 2022-07-14 NOTE — ED ADULT NURSE REASSESSMENT NOTE - NS ED NURSE REASSESS COMMENT FT1
Pt resting comfortably in bed at this time, offers no complaints.  No chest pain or SOB noted.  No n/v/d.  s/p right great toe amputation noted, purulent drainage- wound culture sent to lab.  Right AV fistula + bruit + thrill.  Awaiting medical bed.  Maintain comfort and safety.

## 2022-07-14 NOTE — ED PROVIDER NOTE - PROGRESS NOTE DETAILS
spoke with podiatry resident Dr. Fofana, case discussed, patient to be admitted will see patient in the am spoke with Dr. Parisi, covering for Dr. Larson, will admit

## 2022-07-14 NOTE — H&P ADULT - ASSESSMENT
71 year old female with PMH of Afib (not on AC for hx of multiple falls), T2DM, HTN, HLD, ESRD on HD (MWF), CHF, CAD s/p CABG, PAD S/P R-->L bifem-fem BK pop bypass, recent fempop bypass (6/21/2022), and partial ray amputation right great toe (6/22/2022) presented today from [facility] for wound checkup.  Patient states she noticed something wrong with her surgery site a week after since she has started putting weight on it.  She was recently discharged from  in 7/7 to [facility].  c/o pain 2 weeks ago especially if she puts pressure on her right foot.  Denies fever or chills .Admitted for podiatry evaluation and septic workup ,ID cons Palliative care consult requested ,to discuss advance directives and complete [facility] Pathology Final Diagnosis  06/23/22 1. Right hallux -Acute and chronic osteomyelitis. -Gangrenous skin and soft tissue. 2. Right first metatarsal/clean margin: -Minimal chronic osteomyelitis. Patient was on iv vancomycin with dialysis at [facility] . 71 year old female with PMH of Afib (not on AC for hx of multiple falls), T2DM, HTN, HLD, ESRD on HD (MWF), CHF, CAD s/p CABG, PAD S/P R-->L bifem-fem BK pop bypass, recent fempop bypass (6/21/2022), and partial ray amputation right great toe (6/22/2022) presented today from [facility] for wound checkup.  Patient states she noticed something wrong with her surgery site a week after since she has started putting weight on it.  She was recently discharged from  in 7/7 to [facility].  c/o pain 2 weeks ago especially if she puts pressure on her right foot.  Denies fever or chills .Admitted for podiatry evaluation and septic workup ,ID cons Palliative care consult requested ,to discuss advance directives and complete [facility] Pathology Final Diagnosis  06/23/22 1. Right hallux -Acute and chronic osteomyelitis. -Gangrenous skin and soft tissue. 2. Right first metatarsal/clean margin: -Minimal chronic osteomyelitis. Patient was on iv vancomycin with dialysis at [facility] .ESR 39 and CRP 14  MRSA PCR negative   6/21 s/p Femoral popliteal bypass with prosthetic graft  6/23 s/p R 1st toe ray amputation, Wound culture from OR with MSSA and enterococcus fecalis   Pathology showed OM in margines so need 6 weeks treatment from the start   Was on Unasyn to complete 6 weeks on 7/26.   No leukocytosis or fever on admission   Seen  by ID CONS   Recommendations:  - Will follow blood cultures   - Wound culture has been sent x 2 , will follow   - Vascular surgery and podiatry consults   - Based on old cultures will continue zosyn or unasyn until wound culture is back

## 2022-07-14 NOTE — ED ADULT TRIAGE NOTE - WEIGHT IN KG
chest wall non-tender, breathing is unlabored without accessory muscle use, normal breath sounds 60.8

## 2022-07-14 NOTE — H&P ADULT - NSICDXPASTSURGICALHX_GEN_ALL_CORE_FT
PAST SURGICAL HISTORY:  coronary stent 2007     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     s/p Ovarian cyst removal     s/p surgical removal of benign Skin lesion epigastric area

## 2022-07-14 NOTE — CONSULT NOTE ADULT - ASSESSMENT
71 year old female with PMH of Afib (not on AC for hx of multiple falls), T2DM, HTN, HLD, ESRD on HD (MWF), CHF, CAD s/p CABG, PAD S/P R-->L bifem-fem BK pop bypass, recent fempop bypass (6/21/2022), and partial ray amputation right great toe (6/22/2022) presented today from [facility] for wound checkup.  Patient states she noticed something wrong with her surgery site a week after since she has started putting weight on it.  She was recently discharged from  in 7/7 to [facility].  c/o pain 2 weeks ago especially if she puts pressure on her right foot.  Denies fever or chills .Admitted for podiatry evaluation and septic workup ,ID cons Palliative care consult requested ,to discuss advance directives and complete [facility] Pathology Final Diagnosis  06/23/22 1. Right hallux -Acute and chronic osteomyelitis. -Gangrenous skin and soft tissue. 2. Right first metatarsal/clean margin: -Minimal chronic osteomyelitis. Patient was on iv vancomycin with dialysis at [facility] . (14 Jul 2022 19:42)          esrd on hd plan for hd as order      Excess fluids and waste products will be removed from your blood; your electrolytes will be balanced; your blood pressure will be controlled.    ANEMIA PLAN:  Anemia of chronic disease:  We will continue epo  aiming for a HCT of 32-36 %.   We will monitor Iron stores, B12 and RBC folate .    BP monitoring,continue current antihypertensive meds, low salt diet  metoprolol tartrate 100 milliGRAM(s) Oral every 12 hours  cloNIDine 0.1 milliGRAM(s) Oral two times a day    f/u  blood and urine cx,serial lactate levels,monitor vitals closley,ivfs hydration,monitor urine output and renal profile,iv abx    dvt heparin   Injectable 5000 Unit(s) SubCutaneous every 12 hours

## 2022-07-14 NOTE — ED PROVIDER NOTE - OBJECTIVE STATEMENT
71 year old female with PMH of A.fib rate controlled not on AC for hx of multiple falls, T2DM, HTN, HLD, ESRD on HD, CHF, s/p CABG, PAD S/P R-->L bifem-fem BK pop bypass, recent fempop bypass (6/2022), and partial ray amputation right great toe (6/2022) presented today from [facility] for wound checkup. 71 year old female with PMH of A.fib rate controlled not on AC for hx of multiple falls, T2DM, HTN, HLD, ESRD on HD, CHF, CAD s/p CABG, PAD S/P R-->L bifem-fem BK pop bypass, recent fempop bypass (6/2022), and partial ray amputation right great toe (6/2022) presented today from [facility] for wound checkup. 71 year old female with PMH of A.fib rate controlled not on AC for hx of multiple falls, T2DM, HTN, HLD, ESRD on HD, CHF, CAD s/p CABG, PAD S/P R-->L bifem-fem BK pop bypass, recent fempop bypass (6/21/2022), and partial ray amputation right great toe (6/22/2022) presented today from [facility] for wound checkup.  Patient states she noticed something wrong with her surgery site a week after since she has started putting weight on it.  She was recently discharged from  in 7/7 to [facility].  c/o pain 2 weeks ago especially if she puts pressure on her right foot.  Denies fever or chills. 71 year old female with PMH of Afib (not on AC for hx of multiple falls), T2DM, HTN, HLD, ESRD on HD, CHF, CAD s/p CABG, PAD S/P R-->L bifem-fem BK pop bypass, recent fempop bypass (6/21/2022), and partial ray amputation right great toe (6/22/2022) presented today from [facility] for wound checkup.  Patient states she noticed something wrong with her surgery site a week after since she has started putting weight on it.  She was recently discharged from  in 7/7 to [facility].  c/o pain 2 weeks ago especially if she puts pressure on her right foot.  Denies fever or chills. 71 year old female with PMH of Afib (not on AC for hx of multiple falls), T2DM, HTN, HLD, ESRD on HD (MWF), CHF, CAD s/p CABG, PAD S/P R-->L bifem-fem BK pop bypass, recent fempop bypass (6/21/2022), and partial ray amputation right great toe (6/22/2022) presented today from [facility] for wound checkup.  Patient states she noticed something wrong with her surgery site a week after since she has started putting weight on it.  She was recently discharged from  in 7/7 to [facility].  c/o pain 2 weeks ago especially if she puts pressure on her right foot.  Denies fever or chills.

## 2022-07-14 NOTE — H&P ADULT - NSHPLABSRESULTS_GEN_ALL_CORE
< from: CT Head No Cont (07.12.22 @ 09:22) >    tatuspost fall    TECHNIQUE: Axial CT scanning of the brain and cervical spine were   obtained without the administration of intravenous contrast.  Images were   reformatted in the sagittal and coronal planes.    COMPARISON: CT brain 6/30/2022. CT cervical spine 6/15/2022    FINDINGS:    CT brain:    No hydrocephalus, mass effect, midline shift, acute intracranial   hemorrhage, or brain edema.    Moderate white matter microvascular ischemic disease. Chronic bilateral   basal ganglia infarcts.    No displaced calvarial fracture.    Visualized paranasal sinuses and mastoid air cells are clear.    CT cervical spine:    No acute fracture or traumatic subluxation. No prevertebral soft tissue   swelling.    Vertebral body height and facet alignment are maintained. Grade 1   anterolisthesis of C4 on C5. Maintenance of the normal cervical lordosis.   Fusion of C6 and C7 vertebral bodies.    Multilevel disc space narrowing.    Alignment at the craniocervical junction unremarkable.    Multilevel degenerative changes.    Visualized lung apices clear. Visualized soft tissues unremarkable.    IMPRESSION:    CT brain:  No hydrocephalus, acute intracranial hemorrhage, mass effect, or brain   edema.  No displaced calvarial fracture.    CT cervical spine:  No acute fracture or traumatic subluxation.  No prevertebral soft tissue swelling.  Degenerative changes.    < end of copied text >< from: TTE Echo Complete w/o Contrast w/ Doppler (04.08.22 @ 10:58) >    FINDINGS  Left Ventricle: Left ventricle was of normal size, moderate LV systolic   dysfunction EF 45%  Aortic Valve: Aortic sclerosis  Mitral Valve: Moderate mitral regurgitation  Tricuspid Valve: Trace tricuspid regurgitation  Pulmonic Valve: Normal  Left Atrium: Normal  Right Ventricle: Normal  Right Atrium: Normal  Diastolic Function: Normal  Pericardium/Pleura: Normal    < end of copied text >

## 2022-07-14 NOTE — H&P ADULT - TIME BILLING
75minutes spent on this visit, 50% visit time spent in care co-ordination with other attendings and counselling patient ,writing admission orders ( see complete and current orders and order section) ,requesting necessary consults ,informing family about status & plan of care .I have discussed care plan with [facility] wellness/admitting /nursing   department ,outpatient PCP , hospital consultants , ER physician & med staff .

## 2022-07-14 NOTE — H&P ADULT - HISTORY OF PRESENT ILLNESS
71 year old female with PMH of Afib (not on AC for hx of multiple falls), T2DM, HTN, HLD, ESRD on HD (MWF), CHF, CAD s/p CABG, PAD S/P R-->L bifem-fem BK pop bypass, recent fempop bypass (6/21/2022), and partial ray amputation right great toe (6/22/2022) presented today from [facility] for wound checkup.  Patient states she noticed something wrong with her surgery site a week after since she has started putting weight on it.  She was recently discharged from  in 7/7 to [facility].  c/o pain 2 weeks ago especially if she puts pressure on her right foot.  Denies fever or chills .Admitted for podiatry evaluation and septic workup ,ID cons Palliative care consult requested ,to discuss advance directives and complete [facility] Pathology Final Diagnosis  06/23/22 1. Right hallux -Acute and chronic osteomyelitis. -Gangrenous skin and soft tissue. 2. Right first metatarsal/clean margin: -Minimal chronic osteomyelitis. Patient was on iv vancomycin with dialysis at [facility] .

## 2022-07-14 NOTE — ED PROVIDER NOTE - NS ED ATTENDING STATEMENT MOD
This was a shared visit with the MURPHY. I reviewed and verified the documentation and independently performed the documented: Attending with

## 2022-07-14 NOTE — ED ADULT NURSE NOTE - OBJECTIVE STATEMENT
the patient presents to the er from [facility] for wound check right big toe following an amputation.

## 2022-07-14 NOTE — H&P ADULT - PROBLEM SELECTOR PLAN 1
seen by podiatry - 3 x 1 cm ulceration noted to the Right foot, 1st ray surgical site wound dehiscence, fibrogranular wound bed, no probe to bone, no periwound erythema, no fluctuance, no malodor, no proximal streaking at this time. Remainder of sutures to the 1st ray intact. seen by ID -

## 2022-07-14 NOTE — ED ADULT NURSE NOTE - CHIEF COMPLAINT QUOTE
héctor from [facility] for wound check right big toe.  patient underwent right big toe amputation and wound needs to be cleansed.

## 2022-07-14 NOTE — ED PROVIDER NOTE - CLINICAL SUMMARY MEDICAL DECISION MAKING FREE TEXT BOX
recent right great toe amputation, with drainage, pus from wound site, f/u labs, cultures, xrays, iv antibiotics, podiatry consult, admit

## 2022-07-14 NOTE — H&P ADULT - NSICDXPASTMEDICALHX_GEN_ALL_CORE_FT
PAST MEDICAL HISTORY:  Anemia secondary to renal failure     Ataxia     CAD (coronary artery disease)     CRF (chronic renal failure), unspecified stage     Depression     Diabetes mellitus II     Dialysis patient     ESRD on dialysis     Falls     h/o Anxiety attack     h/o Hepatitis A 1969 currently resolved, no symptoms    h/o Myocardial infarct 2007     h/o Smoking quitted 3/2012    HTN (hypertension)     HTN (hypertension)     Murmur, cardiac     Osteomyelitis     PAD (peripheral artery disease)

## 2022-07-14 NOTE — H&P ADULT - MUSCULOSKELETAL COMMENTS
3 x 1 cm ulceration noted to the Right foot, 1st ray surgical site wound dehiscence, fibrogranular wound bed, no probe to bone, no periwound erythema, no fluctuance, no malodor, no proximal streaking at this time. Remainder of sutures to the 1st ray intact.

## 2022-07-14 NOTE — ED PROVIDER NOTE - ATTENDING CONTRIBUTION TO CARE
71 female recent right great toe amputation, with drainage, pus from wound site, f/u labs, cultures, xrays, iv antibiotics, podiatry consult, admit

## 2022-07-14 NOTE — CONSULT NOTE ADULT - SUBJECTIVE AND OBJECTIVE BOX
[facility]     Patient is a 71y old  Female who presents with a chief complaint of R foot wound infection (14 Jul 2022 19:42)      HPI:  71 year old female with PMH of Afib (not on AC for hx of multiple falls), T2DM, HTN, HLD, ESRD on HD (MWF), CHF, CAD s/p CABG, PAD S/P R-->L bifem-fem BK pop bypass, recent fempop bypass (6/21/2022), and partial ray amputation right great toe (6/22/2022) presented today from [facility] for wound checkup.  Patient states she noticed something wrong with her surgery site a week after since she has started putting weight on it.  She was recently discharged from  in 7/7 to [facility].  c/o pain 2 weeks ago especially if she puts pressure on her right foot.  Denies fever or chills .Admitted for podiatry evaluation and septic workup ,ID cons Palliative care consult requested ,to discuss advance directives and complete MOLST Pathology Final Diagnosis  06/23/22 1. Right hallux -Acute and chronic osteomyelitis. -Gangrenous skin and soft tissue. 2. Right first metatarsal/clean margin: -Minimal chronic osteomyelitis. Patient was on iv vancomycin with dialysis at [facility] . (14 Jul 2022 19:42)      HPI:    71y Female for Cardiology Consult    PAST MEDICAL & SURGICAL HISTORY:  Diabetes mellitus II      HTN (hypertension)      h/o Anxiety attack      Depression      h/o Myocardial infarct 2007      CAD (coronary artery disease)      h/o Hepatitis A 1969  currently resolved, no symptoms      PAD (peripheral artery disease)      Murmur, cardiac      h/o Smoking  quitted 3/2012      CRF (chronic renal failure), unspecified stage      Dialysis patient      Anemia secondary to renal failure      HTN (hypertension)      Osteomyelitis      ESRD on dialysis      Falls      Ataxia      coronary stent 2007      s/p Ovarian cyst removal      s/p surgical removal of benign Skin lesion epigastric area      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          FAMILY HISTORY:      SOCIAL HISTORY:   Alcohol:  Smoking:    Allergies    latex (Unknown)  No Known Drug Allergies    Intolerances        MEDICATIONS  (STANDING):  aspirin enteric coated 81 milliGRAM(s) Oral daily  atorvastatin 40 milliGRAM(s) Oral at bedtime  calcium acetate 667 milliGRAM(s) Oral three times a day with meals  cloNIDine 0.1 milliGRAM(s) Oral two times a day  imipramine 50 milliGRAM(s) Oral daily  lactobacillus acidophilus 1 Tablet(s) Oral two times a day  metoprolol tartrate 100 milliGRAM(s) Oral every 12 hours  Nephro-elvie 1 Tablet(s) Oral daily  pantoprazole    Tablet 40 milliGRAM(s) Oral before breakfast  piperacillin/tazobactam IVPB.. 3.375 Gram(s) IV Intermittent every 12 hours  risperiDONE   Tablet 0.5 milliGRAM(s) Oral two times a day  senna 2 Tablet(s) Oral at bedtime  zinc sulfate 220 milliGRAM(s) Oral daily    MEDICATIONS  (PRN):  acetaminophen     Tablet .. 650 milliGRAM(s) Oral every 6 hours PRN Temp greater or equal to 38C (100.4F), Mild Pain (1 - 3)  aluminum hydroxide/magnesium hydroxide/simethicone Suspension 30 milliLiter(s) Oral every 4 hours PRN Dyspepsia  bisacodyl Suppository 10 milliGRAM(s) Rectal daily PRN Constipation  melatonin 3 milliGRAM(s) Oral at bedtime PRN Insomnia  ondansetron Injectable 4 milliGRAM(s) IV Push every 8 hours PRN Nausea and/or Vomiting  traMADol 50 milliGRAM(s) Oral three times a day PRN Moderate Pain (4 - 6)      REVIEW OF SYSTEMS:  CONSTITUTIONAL: No fever, weight loss, chills, shakes, or fat  RESPIRATORY: No cough, wheezing, hemoptysis, or shortness of breath  CARDIOVASCULAR: No chest pain, dyspnea, palpitations, dizziness, syncope, paroxysmal nocturnal dyspnea, orthopnea, or arm or leg swelling  GASTROINTESTINAL: No abdominal  or epigastric pain, nausea, vomiting, hematemesis, diarrhea, constipation, melena or bright red bloo  NEUROLOGICAL: No headaches, memory loss, slurred speech, limb weakness, loss of strength, numbness, or tremors  SKIN: No itching, burning, rashes, or lesions  ENDOCRINE: No heat or cold intolerance, or hair loss  MUSCULOSKELETAL: No joint pain or swelling, muscle, back, or extremity pain  HEME/LYMPH: No easy bruising or bleeding gums  ALLERY AND IMMUNOLOGIC: No hives or rash.    Vital Signs Last 24 Hrs  T(C): 36.5 (14 Jul 2022 21:45), Max: 36.8 (14 Jul 2022 16:07)  T(F): 97.7 (14 Jul 2022 21:45), Max: 98.2 (14 Jul 2022 16:07)  HR: 74 (14 Jul 2022 21:45) (74 - 78)  BP: 137/72 (14 Jul 2022 21:45) (136/70 - 137/72)  BP(mean): --  RR: 16 (14 Jul 2022 21:45) (16 - 19)  SpO2: 100% (14 Jul 2022 21:45) (99% - 100%)    Parameters below as of 14 Jul 2022 21:45  Patient On (Oxygen Delivery Method): room air        PHYSICAL EXAM:  HEAD:  Atraumatic, Normocephalic  EYES: EOMI, PERRLA, conjunctiva and sclera clear  NECK: Supple and normal thyroid.  No JVD or carotid bruit.   HEART: S1, S2 regular , 1/6 soft ejection systolic murmur in mitral area , no thrill and no gallops .  PULMONARY: Bilateral vesicular breathing , few scattered ronchi and few scattered rales are present .  ABDOMEN: Soft nontender and positive bowl sounds   EXTREMITIES:  No clubbing, cyanosis, or pedal  edema  NEUROLOGICAL: AAOX3 , no focal deficit .    LABS:                        10.2   9.08  )-----------( 442      ( 14 Jul 2022 15:15 )             34.2     07-14    138  |  96  |  40<H>  ----------------------------<  153<H>  3.3<L>   |  36<H>  |  5.20<H>    Ca    9.9      14 Jul 2022 15:15    TPro  7.1  /  Alb  2.8<L>  /  TBili  0.3  /  DBili  x   /  AST  24  /  ALT  17  /  AlkPhos  120  07-14        PT/INR - ( 14 Jul 2022 15:15 )   PT: 10.7 sec;   INR: 0.92 ratio         PTT - ( 14 Jul 2022 15:15 )  PTT:27.8 sec    BNP      EKG:  ECHO:  IMAGING:    Assessment and Plan :     Will continue to follow during hospital course and give further recommendations as needed . Thanks for your referral . if any questions please contact me at office ([phone])cell [phone])  [facility]     Patient is a 71y old  Female who presents with a chief complaint of R foot wound infection (14 Jul 2022 19:42)      · Chief Complaint: The patient is a 71y Female complaining of wound check.  · HPI Objective Statement: 71 year old female with PMH of Afib (not on AC for hx of multiple falls), T2DM, HTN, HLD, ESRD on HD (MWF), CHF, CAD s/p CABG, PAD S/P R-->L bifem-fem BK pop bypass, recent fempop bypass (6/21/2022), and partial ray amputation right great toe (6/22/2022) presented today from [facility] for wound checkup.  Patient states she noticed something wrong with her surgery site a week after since she has started putting weight on it.  She was recently discharged from  in 7/7 to [facility].  c/o pain 2 weeks ago especially if she puts pressure on her right foot.  Denies fever or chills.    HPI:  71 year old female with PMH of Afib (not on AC for hx of multiple falls), T2DM, HTN, HLD, ESRD on HD (MWF), CHF, CAD s/p CABG, PAD S/P R-->L bifem-fem BK pop bypass, recent fempop bypass (6/21/2022), and partial ray amputation right great toe (6/22/2022) presented today from [facility] for wound checkup.  Patient states she noticed something wrong with her surgery site a week after since she has started putting weight on it.  She was recently discharged from  in 7/7 to [facility].  c/o pain 2 weeks ago especially if she puts pressure on her right foot.  Denies fever or chills .Admitted for podiatry evaluation and septic workup ,ID cons Palliative care consult requested ,to discuss advance directives and complete MOLST Pathology Final Diagnosis  06/23/22 1. Right hallux -Acute and chronic osteomyelitis. -Gangrenous skin and soft tissue. 2. Right first metatarsal/clean margin: -Minimal chronic osteomyelitis. Patient was on iv vancomycin with dialysis at [facility] . (14 Jul 2022 19:42)      HPI:    71y Female for Cardiology Consult    PAST MEDICAL & SURGICAL HISTORY:  Diabetes mellitus II      HTN (hypertension)      h/o Anxiety attack      Depression      h/o Myocardial infarct 2007      CAD (coronary artery disease)      h/o Hepatitis A 1969  currently resolved, no symptoms      PAD (peripheral artery disease)      Murmur, cardiac      h/o Smoking  quitted 3/2012      CRF (chronic renal failure), unspecified stage      Dialysis patient      Anemia secondary to renal failure      HTN (hypertension)      Osteomyelitis      ESRD on dialysis      Falls      Ataxia      coronary stent 2007      s/p Ovarian cyst removal      s/p surgical removal of benign Skin lesion epigastric area      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          FAMILY HISTORY:      SOCIAL HISTORY:   Alcohol:  Smoking:    Allergies    latex (Unknown)  No Known Drug Allergies    Intolerances        MEDICATIONS  (STANDING):  aspirin enteric coated 81 milliGRAM(s) Oral daily  atorvastatin 40 milliGRAM(s) Oral at bedtime  calcium acetate 667 milliGRAM(s) Oral three times a day with meals  cloNIDine 0.1 milliGRAM(s) Oral two times a day  imipramine 50 milliGRAM(s) Oral daily  lactobacillus acidophilus 1 Tablet(s) Oral two times a day  metoprolol tartrate 100 milliGRAM(s) Oral every 12 hours  Nephro-elvie 1 Tablet(s) Oral daily  pantoprazole    Tablet 40 milliGRAM(s) Oral before breakfast  piperacillin/tazobactam IVPB.. 3.375 Gram(s) IV Intermittent every 12 hours  risperiDONE   Tablet 0.5 milliGRAM(s) Oral two times a day  senna 2 Tablet(s) Oral at bedtime  zinc sulfate 220 milliGRAM(s) Oral daily    MEDICATIONS  (PRN):  acetaminophen     Tablet .. 650 milliGRAM(s) Oral every 6 hours PRN Temp greater or equal to 38C (100.4F), Mild Pain (1 - 3)  aluminum hydroxide/magnesium hydroxide/simethicone Suspension 30 milliLiter(s) Oral every 4 hours PRN Dyspepsia  bisacodyl Suppository 10 milliGRAM(s) Rectal daily PRN Constipation  melatonin 3 milliGRAM(s) Oral at bedtime PRN Insomnia  ondansetron Injectable 4 milliGRAM(s) IV Push every 8 hours PRN Nausea and/or Vomiting  traMADol 50 milliGRAM(s) Oral three times a day PRN Moderate Pain (4 - 6)      REVIEW OF SYSTEMS:  CONSTITUTIONAL: No fever, weight loss, chills, shakes, or fat  RESPIRATORY: No cough, wheezing, hemoptysis, or shortness of breath  CARDIOVASCULAR: No chest pain, dyspnea, palpitations, dizziness, syncope, paroxysmal nocturnal dyspnea, orthopnea, or arm or leg swelling  GASTROINTESTINAL: No abdominal  or epigastric pain, nausea, vomiting, hematemesis, diarrhea, constipation, melena or bright red bloo  NEUROLOGICAL: No headaches, memory loss, slurred speech, limb weakness, loss of strength, numbness, or tremors  SKIN: No itching, burning, rashes, or lesions  ENDOCRINE: No heat or cold intolerance, or hair loss  MUSCULOSKELETAL: No joint pain or swelling, muscle, back, or extremity pain  HEME/LYMPH: No easy bruising or bleeding gums  ALLERY AND IMMUNOLOGIC: No hives or rash.    Vital Signs Last 24 Hrs  T(C): 36.5 (14 Jul 2022 21:45), Max: 36.8 (14 Jul 2022 16:07)  T(F): 97.7 (14 Jul 2022 21:45), Max: 98.2 (14 Jul 2022 16:07)  HR: 74 (14 Jul 2022 21:45) (74 - 78)  BP: 137/72 (14 Jul 2022 21:45) (136/70 - 137/72)  BP(mean): --  RR: 16 (14 Jul 2022 21:45) (16 - 19)  SpO2: 100% (14 Jul 2022 21:45) (99% - 100%)    Parameters below as of 14 Jul 2022 21:45  Patient On (Oxygen Delivery Method): room air        PHYSICAL EXAM:  HEAD:  Atraumatic, Normocephalic  EYES: EOMI, PERRLA, conjunctiva and sclera clear  NECK: Supple and normal thyroid.  No JVD or carotid bruit.   HEART: S1, S2 regular , 1/6 soft ejection systolic murmur in mitral area , no thrill and no gallops .  PULMONARY: Bilateral vesicular breathing , few scattered ronchi and few scattered rales are present .  ABDOMEN: Soft nontender and positive bowl sounds   EXTREMITIES:  No clubbing, cyanosis, or pedal  edema  NEUROLOGICAL: AAOX3 , no focal deficit .    LABS:                        10.2   9.08  )-----------( 442      ( 14 Jul 2022 15:15 )             34.2     07-14    138  |  96  |  40<H>  ----------------------------<  153<H>  3.3<L>   |  36<H>  |  5.20<H>    Ca    9.9      14 Jul 2022 15:15    TPro  7.1  /  Alb  2.8<L>  /  TBili  0.3  /  DBili  x   /  AST  24  /  ALT  17  /  AlkPhos  120  07-14        PT/INR - ( 14 Jul 2022 15:15 )   PT: 10.7 sec;   INR: 0.92 ratio         PTT - ( 14 Jul 2022 15:15 )  PTT:27.8 sec    BNP      EKG:  ECHO:  IMAGING:    Assessment and Plan :   FULL CONSULT DICTATED .  71 years old female with H/O CAD , CABG , atrial fibrillation , CHF , PVD has foot infection and continue I/V antibiotics . Full anticoagulation contraindicated due to risk of falls .   Will continue to follow during hospital course and give further recommendations as needed . Thanks for your referral . if any questions please contact me at office ([phone])cell [phone])  [facility] .[facility]     Patient is a 71y old  Female who presents with a chief complaint of R foot wound infection (14 Jul 2022 19:42)      · Chief Complaint: The patient is a 71y Female complaining of wound check.  · HPI Objective Statement: 71 year old female with PMH of Afib (not on AC for hx of multiple falls), T2DM, HTN, HLD, ESRD on HD (MWF), CHF, CAD s/p CABG, PAD S/P R-->L bifem-fem BK pop bypass, recent fempop bypass (6/21/2022), and partial ray amputation right great toe (6/22/2022) presented today from [facility] for wound checkup.  Patient states she noticed something wrong with her surgery site a week after since she has started putting weight on it.  She was recently discharged from  in 7/7 to [facility].  c/o pain 2 weeks ago especially if she puts pressure on her right foot.  Denies fever or chills.    HPI:  71 year old female with PMH of Afib (not on AC for hx of multiple falls), T2DM, HTN, HLD, ESRD on HD (MWF), CHF, CAD s/p CABG, PAD S/P R-->L bifem-fem BK pop bypass, recent fempop bypass (6/21/2022), and partial ray amputation right great toe (6/22/2022) presented today from [facility] for wound checkup.  Patient states she noticed something wrong with her surgery site a week after since she has started putting weight on it.  She was recently discharged from  in 7/7 to [facility].  c/o pain 2 weeks ago especially if she puts pressure on her right foot.  Denies fever or chills .Admitted for podiatry evaluation and septic workup ,ID cons Palliative care consult requested ,to discuss advance directives and complete MOLST Pathology Final Diagnosis  06/23/22 1. Right hallux -Acute and chronic osteomyelitis. -Gangrenous skin and soft tissue. 2. Right first metatarsal/clean margin: -Minimal chronic osteomyelitis. Patient was on iv vancomycin with dialysis at [facility] . (14 Jul 2022 19:42)      HPI:    71y Female for Cardiology Consult    PAST MEDICAL & SURGICAL HISTORY:  Diabetes mellitus II      HTN (hypertension)      h/o Anxiety attack      Depression      h/o Myocardial infarct 2007      CAD (coronary artery disease)      h/o Hepatitis A 1969  currently resolved, no symptoms      PAD (peripheral artery disease)      Murmur, cardiac      h/o Smoking  quitted 3/2012      CRF (chronic renal failure), unspecified stage      Dialysis patient      Anemia secondary to renal failure      HTN (hypertension)      Osteomyelitis      ESRD on dialysis      Falls      Ataxia      coronary stent 2007      s/p Ovarian cyst removal      s/p surgical removal of benign Skin lesion epigastric area      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          FAMILY HISTORY:      SOCIAL HISTORY:   Alcohol:  Smoking:    Allergies    latex (Unknown)  No Known Drug Allergies    Intolerances        MEDICATIONS  (STANDING):  aspirin enteric coated 81 milliGRAM(s) Oral daily  atorvastatin 40 milliGRAM(s) Oral at bedtime  calcium acetate 667 milliGRAM(s) Oral three times a day with meals  cloNIDine 0.1 milliGRAM(s) Oral two times a day  imipramine 50 milliGRAM(s) Oral daily  lactobacillus acidophilus 1 Tablet(s) Oral two times a day  metoprolol tartrate 100 milliGRAM(s) Oral every 12 hours  Nephro-elvie 1 Tablet(s) Oral daily  pantoprazole    Tablet 40 milliGRAM(s) Oral before breakfast  piperacillin/tazobactam IVPB.. 3.375 Gram(s) IV Intermittent every 12 hours  risperiDONE   Tablet 0.5 milliGRAM(s) Oral two times a day  senna 2 Tablet(s) Oral at bedtime  zinc sulfate 220 milliGRAM(s) Oral daily    MEDICATIONS  (PRN):  acetaminophen     Tablet .. 650 milliGRAM(s) Oral every 6 hours PRN Temp greater or equal to 38C (100.4F), Mild Pain (1 - 3)  aluminum hydroxide/magnesium hydroxide/simethicone Suspension 30 milliLiter(s) Oral every 4 hours PRN Dyspepsia  bisacodyl Suppository 10 milliGRAM(s) Rectal daily PRN Constipation  melatonin 3 milliGRAM(s) Oral at bedtime PRN Insomnia  ondansetron Injectable 4 milliGRAM(s) IV Push every 8 hours PRN Nausea and/or Vomiting  traMADol 50 milliGRAM(s) Oral three times a day PRN Moderate Pain (4 - 6)      REVIEW OF SYSTEMS:  CONSTITUTIONAL: No fever, weight loss, chills, shakes, or fat  RESPIRATORY: No cough, wheezing, hemoptysis, or shortness of breath  CARDIOVASCULAR: No chest pain, dyspnea, palpitations, dizziness, syncope, paroxysmal nocturnal dyspnea, orthopnea, or arm or leg swelling  GASTROINTESTINAL: No abdominal  or epigastric pain, nausea, vomiting, hematemesis, diarrhea, constipation, melena or bright red bloo  NEUROLOGICAL: No headaches, memory loss, slurred speech, limb weakness, loss of strength, numbness, or tremors  SKIN: No itching, burning, rashes, or lesions  ENDOCRINE: No heat or cold intolerance, or hair loss  MUSCULOSKELETAL: No joint pain or swelling, muscle, back, or extremity pain  HEME/LYMPH: No easy bruising or bleeding gums  ALLERY AND IMMUNOLOGIC: No hives or rash.    Vital Signs Last 24 Hrs  T(C): 36.5 (14 Jul 2022 21:45), Max: 36.8 (14 Jul 2022 16:07)  T(F): 97.7 (14 Jul 2022 21:45), Max: 98.2 (14 Jul 2022 16:07)  HR: 74 (14 Jul 2022 21:45) (74 - 78)  BP: 137/72 (14 Jul 2022 21:45) (136/70 - 137/72)  BP(mean): --  RR: 16 (14 Jul 2022 21:45) (16 - 19)  SpO2: 100% (14 Jul 2022 21:45) (99% - 100%)    Parameters below as of 14 Jul 2022 21:45  Patient On (Oxygen Delivery Method): room air        PHYSICAL EXAM:  HEAD:  Atraumatic, Normocephalic  EYES: EOMI, PERRLA, conjunctiva and sclera clear  NECK: Supple and normal thyroid.  No JVD or carotid bruit.   HEART: S1, S2 regular , 1/6 soft ejection systolic murmur in mitral area , no thrill and no gallops .  PULMONARY: Bilateral vesicular breathing , few scattered ronchi and few scattered rales are present .  ABDOMEN: Soft nontender and positive bowl sounds   EXTREMITIES:  No clubbing, cyanosis, or pedal  edema . Foot infection present .  NEUROLOGICAL: AAOX3 , no focal deficit .    LABS:                        10.2   9.08  )-----------( 442      ( 14 Jul 2022 15:15 )             34.2     07-14    138  |  96  |  40<H>  ----------------------------<  153<H>  3.3<L>   |  36<H>  |  5.20<H>    Ca    9.9      14 Jul 2022 15:15    TPro  7.1  /  Alb  2.8<L>  /  TBili  0.3  /  DBili  x   /  AST  24  /  ALT  17  /  AlkPhos  120  07-14        PT/INR - ( 14 Jul 2022 15:15 )   PT: 10.7 sec;   INR: 0.92 ratio         PTT - ( 14 Jul 2022 15:15 )  PTT:27.8 sec    BNP      EKG:  ECHO:  IMAGING:    Assessment and Plan :   FULL CONSULT DICTATED .  71 years old female with H/O CAD , CABG , atrial fibrillation , CHF , PVD has foot infection and continue I/V antibiotics . Full anticoagulation contraindicated due to risk of falls . Prognosis guarded . Patient is om hemodialysis .  Will continue to follow during hospital course and give further recommendations as needed . Thanks for your referral . if any questions please contact me at office ([phone])cell [phone])

## 2022-07-15 DIAGNOSIS — T81.30XA DISRUPTION OF WOUND, UNSPECIFIED, INITIAL ENCOUNTER: ICD-10-CM

## 2022-07-15 LAB
ALBUMIN SERPL ELPH-MCNC: 2.3 G/DL — LOW (ref 3.3–5)
ALP SERPL-CCNC: 109 U/L — SIGNIFICANT CHANGE UP (ref 40–120)
ALT FLD-CCNC: 14 U/L — SIGNIFICANT CHANGE UP (ref 12–78)
ANION GAP SERPL CALC-SCNC: 7 MMOL/L — SIGNIFICANT CHANGE UP (ref 5–17)
AST SERPL-CCNC: 20 U/L — SIGNIFICANT CHANGE UP (ref 15–37)
BASOPHILS # BLD AUTO: 0.12 K/UL — SIGNIFICANT CHANGE UP (ref 0–0.2)
BASOPHILS NFR BLD AUTO: 1.4 % — SIGNIFICANT CHANGE UP (ref 0–2)
BILIRUB SERPL-MCNC: 0.3 MG/DL — SIGNIFICANT CHANGE UP (ref 0.2–1.2)
BUN SERPL-MCNC: 47 MG/DL — HIGH (ref 7–23)
CALCIUM SERPL-MCNC: 9.5 MG/DL — SIGNIFICANT CHANGE UP (ref 8.5–10.1)
CHLORIDE SERPL-SCNC: 100 MMOL/L — SIGNIFICANT CHANGE UP (ref 96–108)
CHOLEST SERPL-MCNC: 174 MG/DL — SIGNIFICANT CHANGE UP
CO2 SERPL-SCNC: 32 MMOL/L — HIGH (ref 22–31)
CREAT SERPL-MCNC: 5.8 MG/DL — HIGH (ref 0.5–1.3)
CRP SERPL-MCNC: 14 MG/L — HIGH
EGFR: 7 ML/MIN/1.73M2 — LOW
EOSINOPHIL # BLD AUTO: 0.34 K/UL — SIGNIFICANT CHANGE UP (ref 0–0.5)
EOSINOPHIL NFR BLD AUTO: 4 % — SIGNIFICANT CHANGE UP (ref 0–6)
GLUCOSE SERPL-MCNC: 145 MG/DL — HIGH (ref 70–99)
HCT VFR BLD CALC: 29.4 % — LOW (ref 34.5–45)
HDLC SERPL-MCNC: 53 MG/DL — SIGNIFICANT CHANGE UP
HGB BLD-MCNC: 9 G/DL — LOW (ref 11.5–15.5)
IMM GRANULOCYTES NFR BLD AUTO: 1.3 % — SIGNIFICANT CHANGE UP (ref 0–1.5)
INR BLD: 0.92 RATIO — SIGNIFICANT CHANGE UP (ref 0.88–1.16)
LIPID PNL WITH DIRECT LDL SERPL: 87 MG/DL — SIGNIFICANT CHANGE UP
LYMPHOCYTES # BLD AUTO: 1.45 K/UL — SIGNIFICANT CHANGE UP (ref 1–3.3)
LYMPHOCYTES # BLD AUTO: 17 % — SIGNIFICANT CHANGE UP (ref 13–44)
MAGNESIUM SERPL-MCNC: 2.4 MG/DL — SIGNIFICANT CHANGE UP (ref 1.6–2.6)
MCHC RBC-ENTMCNC: 28.9 PG — SIGNIFICANT CHANGE UP (ref 27–34)
MCHC RBC-ENTMCNC: 30.6 GM/DL — LOW (ref 32–36)
MCV RBC AUTO: 94.5 FL — SIGNIFICANT CHANGE UP (ref 80–100)
MONOCYTES # BLD AUTO: 1.34 K/UL — HIGH (ref 0–0.9)
MONOCYTES NFR BLD AUTO: 15.7 % — HIGH (ref 2–14)
NEUTROPHILS # BLD AUTO: 5.15 K/UL — SIGNIFICANT CHANGE UP (ref 1.8–7.4)
NEUTROPHILS NFR BLD AUTO: 60.6 % — SIGNIFICANT CHANGE UP (ref 43–77)
NON HDL CHOLESTEROL: 121 MG/DL — SIGNIFICANT CHANGE UP
NRBC # BLD: 0 /100 WBCS — SIGNIFICANT CHANGE UP (ref 0–0)
PLATELET # BLD AUTO: 395 K/UL — SIGNIFICANT CHANGE UP (ref 150–400)
POTASSIUM SERPL-MCNC: 4.4 MMOL/L — SIGNIFICANT CHANGE UP (ref 3.5–5.3)
POTASSIUM SERPL-SCNC: 4.4 MMOL/L — SIGNIFICANT CHANGE UP (ref 3.5–5.3)
PROT SERPL-MCNC: 6.1 G/DL — SIGNIFICANT CHANGE UP (ref 6–8.3)
PROTHROM AB SERPL-ACNC: 10.7 SEC — SIGNIFICANT CHANGE UP (ref 10.5–13.4)
RBC # BLD: 3.11 M/UL — LOW (ref 3.8–5.2)
RBC # FLD: 18.5 % — HIGH (ref 10.3–14.5)
SODIUM SERPL-SCNC: 139 MMOL/L — SIGNIFICANT CHANGE UP (ref 135–145)
T4 FREE SERPL-MCNC: 1 NG/DL — SIGNIFICANT CHANGE UP (ref 0.9–1.8)
TRIGL SERPL-MCNC: 170 MG/DL — HIGH
TSH SERPL-MCNC: 1.35 UIU/ML — SIGNIFICANT CHANGE UP (ref 0.36–3.74)
WBC # BLD: 8.51 K/UL — SIGNIFICANT CHANGE UP (ref 3.8–10.5)
WBC # FLD AUTO: 8.51 K/UL — SIGNIFICANT CHANGE UP (ref 3.8–10.5)

## 2022-07-15 PROCEDURE — 99222 1ST HOSP IP/OBS MODERATE 55: CPT

## 2022-07-15 RX ORDER — HYDROGEN PEROXIDE 0.3 KG/100L
1 LIQUID TOPICAL ONCE
Refills: 0 | Status: DISCONTINUED | OUTPATIENT
Start: 2022-07-15 | End: 2022-07-17

## 2022-07-15 RX ORDER — HEPARIN SODIUM 5000 [USP'U]/ML
5000 INJECTION INTRAVENOUS; SUBCUTANEOUS EVERY 8 HOURS
Refills: 0 | Status: DISCONTINUED | OUTPATIENT
Start: 2022-07-15 | End: 2022-07-19

## 2022-07-15 RX ORDER — ERYTHROPOIETIN 10000 [IU]/ML
10000 INJECTION, SOLUTION INTRAVENOUS; SUBCUTANEOUS
Refills: 0 | Status: DISCONTINUED | OUTPATIENT
Start: 2022-07-15 | End: 2022-07-19

## 2022-07-15 RX ADMIN — Medication 1 TABLET(S): at 05:48

## 2022-07-15 RX ADMIN — Medication 100 MILLIGRAM(S): at 05:48

## 2022-07-15 RX ADMIN — ERYTHROPOIETIN 10000 UNIT(S): 10000 INJECTION, SOLUTION INTRAVENOUS; SUBCUTANEOUS at 17:50

## 2022-07-15 RX ADMIN — ZINC SULFATE TAB 220 MG (50 MG ZINC EQUIVALENT) 220 MILLIGRAM(S): 220 (50 ZN) TAB at 12:33

## 2022-07-15 RX ADMIN — Medication 667 MILLIGRAM(S): at 08:20

## 2022-07-15 RX ADMIN — HEPARIN SODIUM 5000 UNIT(S): 5000 INJECTION INTRAVENOUS; SUBCUTANEOUS at 21:19

## 2022-07-15 RX ADMIN — Medication 667 MILLIGRAM(S): at 19:52

## 2022-07-15 RX ADMIN — Medication 667 MILLIGRAM(S): at 12:33

## 2022-07-15 RX ADMIN — PANTOPRAZOLE SODIUM 40 MILLIGRAM(S): 20 TABLET, DELAYED RELEASE ORAL at 05:48

## 2022-07-15 RX ADMIN — PIPERACILLIN AND TAZOBACTAM 25 GRAM(S): 4; .5 INJECTION, POWDER, LYOPHILIZED, FOR SOLUTION INTRAVENOUS at 05:47

## 2022-07-15 RX ADMIN — Medication 50 MILLIGRAM(S): at 12:34

## 2022-07-15 RX ADMIN — Medication 0.1 MILLIGRAM(S): at 19:52

## 2022-07-15 RX ADMIN — Medication 1 TABLET(S): at 19:52

## 2022-07-15 RX ADMIN — ATORVASTATIN CALCIUM 40 MILLIGRAM(S): 80 TABLET, FILM COATED ORAL at 21:19

## 2022-07-15 RX ADMIN — SENNA PLUS 2 TABLET(S): 8.6 TABLET ORAL at 21:19

## 2022-07-15 RX ADMIN — HEPARIN SODIUM 5000 UNIT(S): 5000 INJECTION INTRAVENOUS; SUBCUTANEOUS at 05:48

## 2022-07-15 RX ADMIN — PIPERACILLIN AND TAZOBACTAM 25 GRAM(S): 4; .5 INJECTION, POWDER, LYOPHILIZED, FOR SOLUTION INTRAVENOUS at 19:53

## 2022-07-15 RX ADMIN — Medication 100 MILLIGRAM(S): at 19:52

## 2022-07-15 RX ADMIN — RISPERIDONE 0.5 MILLIGRAM(S): 4 TABLET ORAL at 05:48

## 2022-07-15 RX ADMIN — Medication 0.1 MILLIGRAM(S): at 05:48

## 2022-07-15 RX ADMIN — Medication 81 MILLIGRAM(S): at 12:34

## 2022-07-15 RX ADMIN — Medication 1 TABLET(S): at 12:34

## 2022-07-15 RX ADMIN — RISPERIDONE 0.5 MILLIGRAM(S): 4 TABLET ORAL at 19:53

## 2022-07-15 NOTE — CONSULT NOTE ADULT - PROBLEM SELECTOR RECOMMENDATION 9
Pt seen and evaluated  - Labs: WBC 8.51 with no left shift  - X-ray: Post-surgical changes. No fractures nor dislocation  - Right foot surgical site wound dehiscence. Wound bed in fibrotic. NO purulence, no erythema, no edema,  no malodor, no active signs of infection.   - Pt was recently admitted and discharge from the hospital for PAD & osteomyelitis. Pt has a history of chronic osteomyelitis to the right foot and is currently receiving IV abx during her dialysis sessions.   - No further podiatric care at this time. Patient to follow-up outpatient for wound care, vascular, ID, and nephro.     WOUND CARE INSTRUCTIONS  1. Please leave dressing clean, dry and intact until follow-up. Monitor for any signs of infection and present to the ED if they arise.  2. If the dressing gets wet, please change with dry, sterile dressing.  3. Patient to be partial weight bear to the right heel as tolerated with assisted device (pt has a history of falls)  4. Patient is to follow up in [facility] with Dr. Lau or Dr. Pastor within 3-5 days upon discharge. Please call [phone] as soon as discharged and state that it is for a "post-op appointment" Pt seen and evaluated  - Labs: WBC 8.51 with no left shift  - X-ray: Post-surgical changes. No fractures nor dislocation  - Right foot surgical site wound dehiscence. Wound bed in fibrotic. NO purulence, no erythema, no edema,  no malodor, no active signs of infection.   - Pt was recently admitted and discharge from the hospital for PAD & osteomyelitis. Pt has a history of chronic osteomyelitis to the right foot and is currently receiving IV abx during her dialysis sessions.   - No further podiatric care at this time. Patient to follow-up outpatient for wound care.     WOUND CARE INSTRUCTIONS  1. Please leave dressing clean, dry and intact until follow-up. Monitor for any signs of infection and present to the ED if they arise.  2. If the dressing gets wet, please change with dry, sterile dressing.  3. Patient to be partial weight bear to the right heel as tolerated with assisted device (pt has a history of falls)  4. Patient is to follow up in the [facility] with Dr. Lau or Dr. Pastor within 3-5 days upon discharge. Please call [phone] as soon as discharged and state that it is for a "post-op appointment"

## 2022-07-15 NOTE — CONSULT NOTE ADULT - SUBJECTIVE AND OBJECTIVE BOX
Vascular Attending:  Dr Miller      HPI:  71 year old female with PMH of Afib (not on AC for hx of multiple falls), T2DM, HTN, HLD, ESRD on HD (MWF), CHF, CAD s/p CABG, PAD S/P R-->L bifem-fem BK pop bypass, recent R fempop bypass (6/21/2022), and partial ray amputation right great toe (6/22/2022) presented [facility] from [facility] for wound checkup.  Patient states she noticed something wrong with her surgery site a week after since she has started putting weight on it.  She was recently discharged from  in 7/7 to [facility].  c/o pain 2 weeks ago especially if she puts pressure on her right foot.  Denies fever or chills .Admitted for podiatry evaluation and septic workup ,ID cons Palliative care consult requested ,to discuss advance directives and complete MOLST Pathology Final Diagnosis  06/23/22 1. Right hallux -Acute and chronic osteomyelitis. -Gangrenous skin and soft tissue. 2. Right first metatarsal/clean margin: -Minimal chronic osteomyelitis. Patient was on iv vancomycin with dialysis at [facility] . (14 Jul 2022 19:42)    Vascular consulted for post op assessment of R fem-pop bypass.    PAST MEDICAL & SURGICAL HISTORY:  Diabetes mellitus II  HTN (hypertension)  h/o Anxiety attack  Depression  h/o Myocardial infarct 2007  CAD (coronary artery disease)  h/o Hepatitis A 1969  PAD (peripheral artery disease)  Murmur, cardiac  h/o Smoking quit 3/2012  CRF (chronic renal failure), unspecified stage  Dialysis patient  Anemia secondary to renal failure  Osteomyelitis  Coronary stent 2007  s/p Ovarian cyst removal  s/p surgical removal of benign Skin lesion epigastric area  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    REVIEW OF SYSTEMS-negative except as above    MEDICATIONS  (STANDING):  aspirin enteric coated 81 milliGRAM(s) Oral daily  atorvastatin 40 milliGRAM(s) Oral at bedtime  calcium acetate 667 milliGRAM(s) Oral three times a day with meals  cloNIDine 0.1 milliGRAM(s) Oral two times a day  heparin   Injectable 5000 Unit(s) SubCutaneous every 8 hours  imipramine 50 milliGRAM(s) Oral daily  lactobacillus acidophilus 1 Tablet(s) Oral two times a day  metoprolol tartrate 100 milliGRAM(s) Oral every 12 hours  Nephro-elvie 1 Tablet(s) Oral daily  pantoprazole    Tablet 40 milliGRAM(s) Oral before breakfast  piperacillin/tazobactam IVPB.. 3.375 Gram(s) IV Intermittent every 12 hours  risperiDONE   Tablet 0.5 milliGRAM(s) Oral two times a day  senna 2 Tablet(s) Oral at bedtime  zinc sulfate 220 milliGRAM(s) Oral daily    MEDICATIONS  (PRN):  acetaminophen     Tablet .. 650 milliGRAM(s) Oral every 6 hours PRN Temp greater or equal to 38C (100.4F), Mild Pain (1 - 3)  aluminum hydroxide/magnesium hydroxide/simethicone Suspension 30 milliLiter(s) Oral every 4 hours PRN Dyspepsia  bisacodyl Suppository 10 milliGRAM(s) Rectal daily PRN Constipation  melatonin 3 milliGRAM(s) Oral at bedtime PRN Insomnia  ondansetron Injectable 4 milliGRAM(s) IV Push every 8 hours PRN Nausea and/or Vomiting  traMADol 50 milliGRAM(s) Oral three times a day PRN Moderate Pain (4 - 6)      Allergies    latex (Unknown)  No Known Drug Allergies      SOCIAL HISTORY: , currently at [facility]      Vital Signs Last 24 Hrs  T(C): 36.7 (15 Jul 2022 13:15), Max: 36.8 (14 Jul 2022 16:07)  T(F): 98 (15 Jul 2022 13:15), Max: 98.2 (14 Jul 2022 16:07)  HR: 74 (15 Jul 2022 13:15) (68 - 78)  BP: 131/65 (15 Jul 2022 13:15) (131/65 - 137/72)  BP(mean): --  RR: 17 (15 Jul 2022 13:15) (16 - 19)  SpO2: 98% (15 Jul 2022 13:15) (98% - 100%)    Parameters below as of 15 Jul 2022 13:15  Patient On (Oxygen Delivery Method): room air        PHYSICAL EXAM:  Constitutional: Thin frail, F in NAD  Eyes: PERRL  ENMT: WNL  Neck: No JVD  Respiratory: diminished at bases  Cardiovascular: normal S1, S2 with 2/6 systolic murmur  Gastrointestinal:soft, ND, NT, +BS  Extremities: R groin and leg incision without erythema and sutures in place  Neurological: Alert, oriented to self and year, MUHAMMAD X 4  Pulses:   Right:                                                                            FEM [x ]2+ [ ]1+ [ ]doppler                                               POP [ ]2+ [ ]1+ [ ]doppler                                                   DP [ ]2+ [ x]1+ [ ]doppler                                                   PT[ ]2+ [ ]1+ [ ]doppler                                                         LABS:                        9.0    8.51  )-----------( 395      ( 15 Jul 2022 06:12 )             29.4     07-15    139  |  100  |  47<H>  ----------------------------<  145<H>  4.4   |  32<H>  |  5.80<H>    Ca    9.5      15 Jul 2022 06:12  Mg     2.4     07-15    TPro  6.1  /  Alb  2.3<L>  /  TBili  0.3  /  DBili  x   /  AST  20  /  ALT  14  /  AlkPhos  109  07-15    PT/INR - ( 15 Jul 2022 06:12 )   PT: 10.7 sec;   INR: 0.92 ratio    PTT - ( 14 Jul 2022 15:15 )  PTT:27.8 sec      RADIOLOGY & ADDITIONAL STUDIES

## 2022-07-15 NOTE — CONSULT NOTE ADULT - ASSESSMENT
70 y/o F with PAD S/P R fem-pop bypass which appears to be patent  Admitted to medicine for possible infected foot wound  Will need sutures to be removed from RLE during this admission  Wound care as per podiatry  No vascular surgery interventions or testing indicated at this time  Discussed with Dr Miller   PAST MEDICAL HISTORY:  No pertinent past medical history

## 2022-07-15 NOTE — CONSULT NOTE ADULT - SUBJECTIVE AND OBJECTIVE BOX
[facility]  INFECTIOUS DISEASES   [address] [place] [address]  Tel: [phone]     Fax: [phone]  ======================================================  MD Zita Alejandra Kaushal, MD Cho, Michelle, MD   ======================================================    MRN-[id number]  SHILO KOHLER     CC: R foot wound infection     HPI:  70 yo woman with PMH of HTN, DM2, ESRD on HD, Afib, CHF, CAD s/p CABG, PAD S/P R-->L bifem-fem/Pop bypass, recent R fem/pop bypass on 6/21/2022 and partial ray amputation right great toe 6/23/2022   was admitted from [facility] for wound dehiscence and infection. She is known to me from past admissions.   Since there was OM on margines in pathology she was left on Unasyn to complete 6 weeks on 7/26.   No fever or any other symptoms.     PAST MEDICAL & SURGICAL HISTORY:  Diabetes mellitus II  HTN (hypertension)  h/o Anxiety attack  Depression  h/o Myocardial infarct 2007  CAD (coronary artery disease)  h/o Hepatitis A 1969  currently resolved, no symptoms  PAD (peripheral artery disease)  Murmur, cardiac  h/o Smoking  quitted 3/2012  CRF (chronic renal failure), unspecified stage  Dialysis patient  Anemia secondary to renal failure  HTN (hypertension)  coronary stent 2007  s/p Ovarian cyst removal  s/p surgical removal of benign Skin lesion epigastric area    Social Hx: no current smoking, ETOH or drugs     FAMILY HISTORY:  No pertinent family history in first degree relatives    Allergies  latex (Unknown)  No Known Drug Allergies    Antibiotics:  MEDICATIONS  (STANDING):  aspirin enteric coated 81 milliGRAM(s) Oral daily  atorvastatin 40 milliGRAM(s) Oral at bedtime  calcium acetate 667 milliGRAM(s) Oral three times a day with meals  cloNIDine 0.1 milliGRAM(s) Oral two times a day  heparin   Injectable 5000 Unit(s) SubCutaneous every 8 hours  imipramine 50 milliGRAM(s) Oral daily  lactobacillus acidophilus 1 Tablet(s) Oral two times a day  metoprolol tartrate 100 milliGRAM(s) Oral every 12 hours  Nephro-elvie 1 Tablet(s) Oral daily  pantoprazole    Tablet 40 milliGRAM(s) Oral before breakfast  piperacillin/tazobactam IVPB.. 3.375 Gram(s) IV Intermittent every 12 hours  risperiDONE   Tablet 0.5 milliGRAM(s) Oral two times a day  senna 2 Tablet(s) Oral at bedtime  zinc sulfate 220 milliGRAM(s) Oral daily    MEDICATIONS  (PRN):  acetaminophen     Tablet .. 650 milliGRAM(s) Oral every 6 hours PRN Temp greater or equal to 38C (100.4F), Mild Pain (1 - 3)  aluminum hydroxide/magnesium hydroxide/simethicone Suspension 30 milliLiter(s) Oral every 4 hours PRN Dyspepsia  bisacodyl Suppository 10 milliGRAM(s) Rectal daily PRN Constipation  melatonin 3 milliGRAM(s) Oral at bedtime PRN Insomnia  ondansetron Injectable 4 milliGRAM(s) IV Push every 8 hours PRN Nausea and/or Vomiting  traMADol 50 milliGRAM(s) Oral three times a day PRN Moderate Pain (4 - 6)       REVIEW OF SYSTEMS:  CONSTITUTIONAL:  No Fever or chills  HEENT:  No diplopia or blurred vision.  No sore throat or runny nose.  CARDIOVASCULAR:  No chest pain or SOB.  RESPIRATORY:  No cough, shortness of breath, PND or orthopnea.  GASTROINTESTINAL:  No nausea, vomiting or diarrhea.  GENITOURINARY:  No dysuria, frequency or urgency. No Blood in urine  MUSCULOSKELETAL:  no joint aches, no muscle pain  SKIN:  No change in skin, hair or nails.  NEUROLOGIC:  No paresthesias, fasciculations, seizures or weakness.  PSYCHIATRIC:  No disorder of thought or mood.  ENDOCRINE:  No heat or cold intolerance, polyuria or polydipsia.  HEMATOLOGICAL:  No easy bruising or bleeding.     Physical Exam:  Vital Signs Last 24 Hrs  T(C): 36.5 (15 Jul 2022 05:45), Max: 36.8 (14 Jul 2022 16:07)  T(F): 97.7 (15 Jul 2022 05:45), Max: 98.2 (14 Jul 2022 16:07)  HR: 68 (15 Jul 2022 05:45) (68 - 78)  BP: 137/62 (15 Jul 2022 05:45) (136/70 - 137/72)  BP(mean): --  RR: 16 (15 Jul 2022 05:45) (16 - 19)  SpO2: 100% (15 Jul 2022 05:45) (99% - 100%)  Parameters below as of 15 Jul 2022 05:45  Patient On (Oxygen Delivery Method): room air  Height (cm): 175.3 (07-14 @ 16:07)  GEN: NAD  HEENT: normocephalic and atraumatic. EOMI. PERRL.    NECK: Supple.  No lymphadenopathy   LUNGS: Clear to auscultation.  HEART: Regular rate and rhythm  ABDOMEN: Soft, nontender, and nondistended.  Positive bowel sounds.    : No CVA tenderness  EXTREMITIES: bypass wounds are healed, R 1st toe stump open with some purulent discharge, mild cellulitis   NEUROLOGIC: grossly intact.  PSYCHIATRIC: Appropriate affect .  SKIN: No rash     Labs:  07-15    139  |  100  |  47<H>  ----------------------------<  145<H>  4.4   |  32<H>  |  5.80<H>    Ca    9.5      15 Jul 2022 06:12  Mg     2.4     07-15    TPro  6.1  /  Alb  2.3<L>  /  TBili  0.3  /  DBili  x   /  AST  20  /  ALT  14  /  AlkPhos  109  07-15                        9.0    8.51  )-----------( 395      ( 15 Jul 2022 06:12 )             29.4     PT/INR - ( 15 Jul 2022 06:12 )   PT: 10.7 sec;   INR: 0.92 ratio    PTT - ( 14 Jul 2022 15:15 )  PTT:27.8 sec    LIVER FUNCTIONS - ( 15 Jul 2022 06:12 )  Alb: 2.3 g/dL / Pro: 6.1 g/dL / ALK PHOS: 109 U/L / ALT: 14 U/L / AST: 20 U/L / GGT: x           C-Reactive Protein, Serum: 14 mg/L (07-14-22 @ 15:15)    Sedimentation Rate, Erythrocyte: 39 mm/hr (07-14-22 @ 15:15)    COVID-19 PCR: NotDetec (07-14-22 @ 15:15)  COVID-19 PCR: NotDetec (07-06-22 @ 07:15)  COVID-19 PCR: NotDetec (07-02-22 @ 11:50)  COVID-19 PCR: NotDetec (06-28-22 @ 14:00)  COVID-19 PCR: NotDetec (06-23-22 @ 11:42)  COVID-19 PCR: NotDetec (06-20-22 @ 08:14)  SARS-CoV-2: NotDetec (06-15-22 @ 22:44)    All imaging and other data have been reviewed.  < from: Xray Foot AP + Lateral + Oblique, Right (07.14.22 @ 17:02) >  IMPRESSION: Soft tissue ulcer at the amputation site around the right   first metatarsal. Clear lungs at this time.    Assessment and Plan:   70 yo woman with PMH of HTN, DM2, ESRD on HD, Afib, CHF, CAD s/p CABG, PAD S/P R-->L bifem-fem/Pop bypass, recent R fem/pop bypass on 6/21/2022 and partial ray amputation right great toe 6/23/2022   was admitted from [facility] for wound dehiscence and infection.   ESR 39 and CRP 14  MRSA PCR negative   6/21 s/p Femoral popliteal bypass with prosthetic graft  6/23 s/p R 1st toe ray amputation, Wound culture from OR with MSSA and enterococcus fecalis   Pathology showed OM in margines so need 6 weeks treatment from the start   Was on Unasyn to complete 6 weeks on 7/26.   No leukocytosis or fever on admission     Recommendations:  - Will follow blood cultures   - Wound culture has been sent x 2 , will follow   - Vascular surgery and podiatry consults   - Based on old cultures will continue Unasyn 3gm daily until wound culture is back     Thank you for courtesy of this consult.     Will follow.  Discussed with the primary team.     Geovany Long MD  Division of Infectious Diseases   Please call ID service at [phone] with any question.      55 minutes spent on total encounter assessing patient, examination, chart reivew, counseling and coordinating care by the attending physician/nurse/care manager.   [facility]  INFECTIOUS DISEASES   [address] [place] [address]  Tel: [phone]     Fax: [phone]  ======================================================  MD Zita Alejandra Kaushal, MD Cho, Michelle, MD   ======================================================    MRN-[id number]  SHILO KOHLER     CC: R foot wound infection     HPI:  70 yo woman with PMH of HTN, DM2, ESRD on HD, Afib, CHF, CAD s/p CABG, PAD S/P R-->L bifem-fem/Pop bypass, recent R fem/pop bypass on 6/21/2022 and partial ray amputation right great toe 6/23/2022   was admitted from [facility] for wound dehiscence and infection. She is known to me from past admissions.   Since there was OM on margines in pathology she was left on Unasyn to complete 6 weeks on 7/26.   No fever or any other symptoms.     PAST MEDICAL & SURGICAL HISTORY:  Diabetes mellitus II  HTN (hypertension)  h/o Anxiety attack  Depression  h/o Myocardial infarct 2007  CAD (coronary artery disease)  h/o Hepatitis A 1969  currently resolved, no symptoms  PAD (peripheral artery disease)  Murmur, cardiac  h/o Smoking  quitted 3/2012  CRF (chronic renal failure), unspecified stage  Dialysis patient  Anemia secondary to renal failure  HTN (hypertension)  coronary stent 2007  s/p Ovarian cyst removal  s/p surgical removal of benign Skin lesion epigastric area    Social Hx: no current smoking, ETOH or drugs     FAMILY HISTORY:  No pertinent family history in first degree relatives    Allergies  latex (Unknown)  No Known Drug Allergies    Antibiotics:  MEDICATIONS  (STANDING):  aspirin enteric coated 81 milliGRAM(s) Oral daily  atorvastatin 40 milliGRAM(s) Oral at bedtime  calcium acetate 667 milliGRAM(s) Oral three times a day with meals  cloNIDine 0.1 milliGRAM(s) Oral two times a day  heparin   Injectable 5000 Unit(s) SubCutaneous every 8 hours  imipramine 50 milliGRAM(s) Oral daily  lactobacillus acidophilus 1 Tablet(s) Oral two times a day  metoprolol tartrate 100 milliGRAM(s) Oral every 12 hours  Nephro-elvie 1 Tablet(s) Oral daily  pantoprazole    Tablet 40 milliGRAM(s) Oral before breakfast  piperacillin/tazobactam IVPB.. 3.375 Gram(s) IV Intermittent every 12 hours  risperiDONE   Tablet 0.5 milliGRAM(s) Oral two times a day  senna 2 Tablet(s) Oral at bedtime  zinc sulfate 220 milliGRAM(s) Oral daily    MEDICATIONS  (PRN):  acetaminophen     Tablet .. 650 milliGRAM(s) Oral every 6 hours PRN Temp greater or equal to 38C (100.4F), Mild Pain (1 - 3)  aluminum hydroxide/magnesium hydroxide/simethicone Suspension 30 milliLiter(s) Oral every 4 hours PRN Dyspepsia  bisacodyl Suppository 10 milliGRAM(s) Rectal daily PRN Constipation  melatonin 3 milliGRAM(s) Oral at bedtime PRN Insomnia  ondansetron Injectable 4 milliGRAM(s) IV Push every 8 hours PRN Nausea and/or Vomiting  traMADol 50 milliGRAM(s) Oral three times a day PRN Moderate Pain (4 - 6)       REVIEW OF SYSTEMS:  CONSTITUTIONAL:  No Fever or chills  HEENT:  No diplopia or blurred vision.  No sore throat or runny nose.  CARDIOVASCULAR:  No chest pain or SOB.  RESPIRATORY:  No cough, shortness of breath, PND or orthopnea.  GASTROINTESTINAL:  No nausea, vomiting or diarrhea.  GENITOURINARY:  No dysuria, frequency or urgency. No Blood in urine  MUSCULOSKELETAL:  no joint aches, no muscle pain  SKIN:  No change in skin, hair or nails.  NEUROLOGIC:  No paresthesias, fasciculations, seizures or weakness.  PSYCHIATRIC:  No disorder of thought or mood.  ENDOCRINE:  No heat or cold intolerance, polyuria or polydipsia.  HEMATOLOGICAL:  No easy bruising or bleeding.     Physical Exam:  Vital Signs Last 24 Hrs  T(C): 36.5 (15 Jul 2022 05:45), Max: 36.8 (14 Jul 2022 16:07)  T(F): 97.7 (15 Jul 2022 05:45), Max: 98.2 (14 Jul 2022 16:07)  HR: 68 (15 Jul 2022 05:45) (68 - 78)  BP: 137/62 (15 Jul 2022 05:45) (136/70 - 137/72)  BP(mean): --  RR: 16 (15 Jul 2022 05:45) (16 - 19)  SpO2: 100% (15 Jul 2022 05:45) (99% - 100%)  Parameters below as of 15 Jul 2022 05:45  Patient On (Oxygen Delivery Method): room air  Height (cm): 175.3 (07-14 @ 16:07)  GEN: NAD  HEENT: normocephalic and atraumatic. EOMI. PERRL.    NECK: Supple.  No lymphadenopathy   LUNGS: Clear to auscultation.  HEART: Regular rate and rhythm  ABDOMEN: Soft, nontender, and nondistended.  Positive bowel sounds.    : No CVA tenderness  EXTREMITIES: bypass wounds are healed, R 1st toe stump open with some purulent discharge, mild cellulitis   NEUROLOGIC: grossly intact.  PSYCHIATRIC: Appropriate affect .  SKIN: No rash     Labs:  07-15    139  |  100  |  47<H>  ----------------------------<  145<H>  4.4   |  32<H>  |  5.80<H>    Ca    9.5      15 Jul 2022 06:12  Mg     2.4     07-15    TPro  6.1  /  Alb  2.3<L>  /  TBili  0.3  /  DBili  x   /  AST  20  /  ALT  14  /  AlkPhos  109  07-15                        9.0    8.51  )-----------( 395      ( 15 Jul 2022 06:12 )             29.4     PT/INR - ( 15 Jul 2022 06:12 )   PT: 10.7 sec;   INR: 0.92 ratio    PTT - ( 14 Jul 2022 15:15 )  PTT:27.8 sec    LIVER FUNCTIONS - ( 15 Jul 2022 06:12 )  Alb: 2.3 g/dL / Pro: 6.1 g/dL / ALK PHOS: 109 U/L / ALT: 14 U/L / AST: 20 U/L / GGT: x           C-Reactive Protein, Serum: 14 mg/L (07-14-22 @ 15:15)    Sedimentation Rate, Erythrocyte: 39 mm/hr (07-14-22 @ 15:15)    COVID-19 PCR: NotDetec (07-14-22 @ 15:15)  COVID-19 PCR: NotDetec (07-06-22 @ 07:15)  COVID-19 PCR: NotDetec (07-02-22 @ 11:50)  COVID-19 PCR: NotDetec (06-28-22 @ 14:00)  COVID-19 PCR: NotDetec (06-23-22 @ 11:42)  COVID-19 PCR: NotDetec (06-20-22 @ 08:14)  SARS-CoV-2: NotDetec (06-15-22 @ 22:44)    All imaging and other data have been reviewed.  < from: Xray Foot AP + Lateral + Oblique, Right (07.14.22 @ 17:02) >  IMPRESSION: Soft tissue ulcer at the amputation site around the right   first metatarsal. Clear lungs at this time.    Assessment and Plan:   70 yo woman with PMH of HTN, DM2, ESRD on HD, Afib, CHF, CAD s/p CABG, PAD S/P R-->L bifem-fem/Pop bypass, recent R fem/pop bypass on 6/21/2022 and partial ray amputation right great toe 6/23/2022   was admitted from [facility] for wound dehiscence and infection.   ESR 39 and CRP 14  MRSA PCR negative   6/21 s/p Femoral popliteal bypass with prosthetic graft  6/23 s/p R 1st toe ray amputation, Wound culture from OR with MSSA and enterococcus fecalis   Pathology showed OM in margines so need 6 weeks treatment from the start   Was on Unasyn to complete 6 weeks on 7/26.   No leukocytosis or fever on admission     Recommendations:  - Will follow blood cultures   - Wound culture has been sent x 2 , will follow   - Vascular surgery and podiatry consults   - Based on old cultures will continue zosyn or unasyn until wound culture is back     Thank you for courtesy of this consult.     Will follow.  Discussed with the primary team.     Geovany Long MD  Division of Infectious Diseases   Please call ID service at [phone] with any question.      55 minutes spent on total encounter assessing patient, examination, chart reivew, counseling and coordinating care by the attending physician/nurse/care manager.

## 2022-07-15 NOTE — CONSULT NOTE ADULT - SUBJECTIVE AND OBJECTIVE BOX
HPI:  71 year old female with PMH of Afib (not on AC for hx of multiple falls), T2DM, HTN, HLD, ESRD on HD (MWF), CHF, CAD s/p CABG, PAD S/P R-->L bifem-fem BK pop bypass, recent fempop bypass (6/21/2022), and partial ray amputation right great toe (6/22/2022) presented today from [facility] for wound checkup.  Patient states she noticed something wrong with her surgery site a week after since she has started putting weight on it.  She was recently discharged from  in 7/7 to [facility].  c/o pain 2 weeks ago especially if she puts pressure on her right foot.  Denies fever or chills .Admitted for podiatry evaluation and septic workup ,ID cons Palliative care consult requested ,to discuss advance directives and complete MOLST Pathology Final Diagnosis  06/23/22 1. Right hallux -Acute and chronic osteomyelitis. -Gangrenous skin and soft tissue. 2. Right first metatarsal/clean margin: -Minimal chronic osteomyelitis. Patient was on iv vancomycin with dialysis at [facility] . (14 Jul 2022 19:42)      71y year old Female seen at [facility] 40 for Right foot wound.  Denies any fever, chills, nausea, vomiting, chest pain, shortness of breath, or calf pain at this time.    REVIEW OF SYSTEMS    PAST MEDICAL & SURGICAL HISTORY:  Diabetes mellitus II      HTN (hypertension)      h/o Anxiety attack      Depression      h/o Myocardial infarct 2007      CAD (coronary artery disease)      h/o Hepatitis A 1969  currently resolved, no symptoms      PAD (peripheral artery disease)      Murmur, cardiac      h/o Smoking  quitted 3/2012      CRF (chronic renal failure), unspecified stage      Dialysis patient      Anemia secondary to renal failure      HTN (hypertension)      Osteomyelitis      ESRD on dialysis      Falls      Ataxia      coronary stent 2007      s/p Ovarian cyst removal      s/p surgical removal of benign Skin lesion epigastric area      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          Allergies    latex (Unknown)  No Known Drug Allergies    Intolerances        MEDICATIONS  (STANDING):  aspirin enteric coated 81 milliGRAM(s) Oral daily  atorvastatin 40 milliGRAM(s) Oral at bedtime  calcium acetate 667 milliGRAM(s) Oral three times a day with meals  cloNIDine 0.1 milliGRAM(s) Oral two times a day  heparin   Injectable 5000 Unit(s) SubCutaneous every 8 hours  imipramine 50 milliGRAM(s) Oral daily  lactobacillus acidophilus 1 Tablet(s) Oral two times a day  metoprolol tartrate 100 milliGRAM(s) Oral every 12 hours  Nephro-elvie 1 Tablet(s) Oral daily  pantoprazole    Tablet 40 milliGRAM(s) Oral before breakfast  piperacillin/tazobactam IVPB.. 3.375 Gram(s) IV Intermittent every 12 hours  risperiDONE   Tablet 0.5 milliGRAM(s) Oral two times a day  senna 2 Tablet(s) Oral at bedtime  zinc sulfate 220 milliGRAM(s) Oral daily    MEDICATIONS  (PRN):  acetaminophen     Tablet .. 650 milliGRAM(s) Oral every 6 hours PRN Temp greater or equal to 38C (100.4F), Mild Pain (1 - 3)  aluminum hydroxide/magnesium hydroxide/simethicone Suspension 30 milliLiter(s) Oral every 4 hours PRN Dyspepsia  bisacodyl Suppository 10 milliGRAM(s) Rectal daily PRN Constipation  melatonin 3 milliGRAM(s) Oral at bedtime PRN Insomnia  ondansetron Injectable 4 milliGRAM(s) IV Push every 8 hours PRN Nausea and/or Vomiting  traMADol 50 milliGRAM(s) Oral three times a day PRN Moderate Pain (4 - 6)      Social History:  [facility] .No ETOH or TOBACCO reported. (14 Jul 2022 19:42)      FAMILY HISTORY:      Vital Signs Last 24 Hrs  T(C): 36.5 (15 Jul 2022 05:45), Max: 36.8 (14 Jul 2022 16:07)  T(F): 97.7 (15 Jul 2022 05:45), Max: 98.2 (14 Jul 2022 16:07)  HR: 68 (15 Jul 2022 05:45) (68 - 78)  BP: 137/62 (15 Jul 2022 05:45) (136/70 - 137/72)  BP(mean): --  RR: 16 (15 Jul 2022 05:45) (16 - 19)  SpO2: 100% (15 Jul 2022 05:45) (99% - 100%)    Parameters below as of 15 Jul 2022 05:45  Patient On (Oxygen Delivery Method): room air        PHYSICAL EXAM:  Vascular: DP & PT non-palpable bilaterally, Capillary refill 3 seconds, Digital hair absent bilaterally  Neurological: Light touch sensation diminished bilaterally  Musculoskeletal: 5/5 strength in all quadrants bilaterally, AJ & STJ ROM intact  Dermatological: 3 x 1 cm ulceration noted to the Right foot, 1st ray surgical site, fibrogranular wound bed, no probe to bone, no periwound erythema, no fluctuance, no malodor, no proximal streaking at this time. Remainder of sutures to the 1st ray intact.     CBC Full  -  ( 15 Jul 2022 06:12 )  WBC Count : 8.51 K/uL  RBC Count : 3.11 M/uL  Hemoglobin : 9.0 g/dL  Hematocrit : 29.4 %  Platelet Count - Automated : 395 K/uL  Mean Cell Volume : 94.5 fl  Mean Cell Hemoglobin : 28.9 pg  Mean Cell Hemoglobin Concentration : 30.6 gm/dL  Auto Neutrophil # : 5.15 K/uL  Auto Lymphocyte # : 1.45 K/uL  Auto Monocyte # : 1.34 K/uL  Auto Eosinophil # : 0.34 K/uL  Auto Basophil # : 0.12 K/uL  Auto Neutrophil % : 60.6 %  Auto Lymphocyte % : 17.0 %  Auto Monocyte % : 15.7 %  Auto Eosinophil % : 4.0 %  Auto Basophil % : 1.4 %      ----------CHEM PANEL----------    PT/INR - ( 15 Jul 2022 06:12 )   PT: 10.7 sec;   INR: 0.92 ratio         PTT - ( 14 Jul 2022 15:15 )  PTT:27.8 sec        Imaging: ----------   HPI:  71 year old female with PMH of Afib (not on AC for hx of multiple falls), T2DM, HTN, HLD, ESRD on HD (MWF), CHF, CAD s/p CABG, PAD S/P R-->L bifem-fem BK pop bypass, recent fempop bypass (6/21/2022), and partial ray amputation right great toe (6/22/2022) presented today from [facility] for wound checkup.  Patient states she noticed something wrong with her surgery site a week after since she has started putting weight on it.  She was recently discharged from  in 7/7 to [facility].  c/o pain 2 weeks ago especially if she puts pressure on her right foot.  Denies fever or chills .Admitted for podiatry evaluation and septic workup ,ID cons Palliative care consult requested ,to discuss advance directives and complete MOLST Pathology Final Diagnosis  06/23/22 1. Right hallux -Acute and chronic osteomyelitis. -Gangrenous skin and soft tissue. 2. Right first metatarsal/clean margin: -Minimal chronic osteomyelitis. Patient was on iv vancomycin with dialysis at [facility] . (14 Jul 2022 19:42)      71y year old Female seen at [facility] for Right foot wound.  Denies any fever, chills, nausea, vomiting, chest pain, shortness of breath, or calf pain at this time.    REVIEW OF SYSTEMS    PAST MEDICAL & SURGICAL HISTORY:  Diabetes mellitus II      HTN (hypertension)      h/o Anxiety attack      Depression      h/o Myocardial infarct 2007      CAD (coronary artery disease)      h/o Hepatitis A 1969  currently resolved, no symptoms      PAD (peripheral artery disease)      Murmur, cardiac      h/o Smoking  quitted 3/2012      CRF (chronic renal failure), unspecified stage      Dialysis patient      Anemia secondary to renal failure      HTN (hypertension)      Osteomyelitis      ESRD on dialysis      Falls      Ataxia      coronary stent 2007      s/p Ovarian cyst removal      s/p surgical removal of benign Skin lesion epigastric area      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          Allergies    latex (Unknown)  No Known Drug Allergies    Intolerances        MEDICATIONS  (STANDING):  aspirin enteric coated 81 milliGRAM(s) Oral daily  atorvastatin 40 milliGRAM(s) Oral at bedtime  calcium acetate 667 milliGRAM(s) Oral three times a day with meals  cloNIDine 0.1 milliGRAM(s) Oral two times a day  heparin   Injectable 5000 Unit(s) SubCutaneous every 8 hours  imipramine 50 milliGRAM(s) Oral daily  lactobacillus acidophilus 1 Tablet(s) Oral two times a day  metoprolol tartrate 100 milliGRAM(s) Oral every 12 hours  Nephro-elvie 1 Tablet(s) Oral daily  pantoprazole    Tablet 40 milliGRAM(s) Oral before breakfast  piperacillin/tazobactam IVPB.. 3.375 Gram(s) IV Intermittent every 12 hours  risperiDONE   Tablet 0.5 milliGRAM(s) Oral two times a day  senna 2 Tablet(s) Oral at bedtime  zinc sulfate 220 milliGRAM(s) Oral daily    MEDICATIONS  (PRN):  acetaminophen     Tablet .. 650 milliGRAM(s) Oral every 6 hours PRN Temp greater or equal to 38C (100.4F), Mild Pain (1 - 3)  aluminum hydroxide/magnesium hydroxide/simethicone Suspension 30 milliLiter(s) Oral every 4 hours PRN Dyspepsia  bisacodyl Suppository 10 milliGRAM(s) Rectal daily PRN Constipation  melatonin 3 milliGRAM(s) Oral at bedtime PRN Insomnia  ondansetron Injectable 4 milliGRAM(s) IV Push every 8 hours PRN Nausea and/or Vomiting  traMADol 50 milliGRAM(s) Oral three times a day PRN Moderate Pain (4 - 6)      Social History:  Resident in [facility] .No ETOH or TOBACCO reported. (14 Jul 2022 19:42)      FAMILY HISTORY:      Vital Signs Last 24 Hrs  T(C): 36.5 (15 Jul 2022 05:45), Max: 36.8 (14 Jul 2022 16:07)  T(F): 97.7 (15 Jul 2022 05:45), Max: 98.2 (14 Jul 2022 16:07)  HR: 68 (15 Jul 2022 05:45) (68 - 78)  BP: 137/62 (15 Jul 2022 05:45) (136/70 - 137/72)  BP(mean): --  RR: 16 (15 Jul 2022 05:45) (16 - 19)  SpO2: 100% (15 Jul 2022 05:45) (99% - 100%)    Parameters below as of 15 Jul 2022 05:45  Patient On (Oxygen Delivery Method): room air        PHYSICAL EXAM:  Vascular: DP & PT non-palpable bilaterally, Capillary refill 3 seconds, Digital hair absent bilaterally  Neurological: Light touch sensation diminished bilaterally  Musculoskeletal: 5/5 strength in all quadrants bilaterally, AJ & STJ ROM intact  Dermatological: 3 x 1 cm ulceration noted to the Right foot, 1st ray surgical site wound dehiscence, fibrogranular wound bed, no probe to bone, no periwound erythema, no fluctuance, no malodor, no proximal streaking at this time. Remainder of sutures to the 1st ray intact.     CBC Full  -  ( 15 Jul 2022 06:12 )  WBC Count : 8.51 K/uL  RBC Count : 3.11 M/uL  Hemoglobin : 9.0 g/dL  Hematocrit : 29.4 %  Platelet Count - Automated : 395 K/uL  Mean Cell Volume : 94.5 fl  Mean Cell Hemoglobin : 28.9 pg  Mean Cell Hemoglobin Concentration : 30.6 gm/dL  Auto Neutrophil # : 5.15 K/uL  Auto Lymphocyte # : 1.45 K/uL  Auto Monocyte # : 1.34 K/uL  Auto Eosinophil # : 0.34 K/uL  Auto Basophil # : 0.12 K/uL  Auto Neutrophil % : 60.6 %  Auto Lymphocyte % : 17.0 %  Auto Monocyte % : 15.7 %  Auto Eosinophil % : 4.0 %  Auto Basophil % : 1.4 %      ----------CHEM PANEL----------    PT/INR - ( 15 Jul 2022 06:12 )   PT: 10.7 sec;   INR: 0.92 ratio         PTT - ( 14 Jul 2022 15:15 )  PTT:27.8 sec        Imaging: ----------

## 2022-07-15 NOTE — PROGRESS NOTE ADULT - SUBJECTIVE AND OBJECTIVE BOX
Patient is a 71y Female with a known history of :  Cellulitis [L03.90]    Acute on chronic osteomyelitis [M86.10]    DM (diabetes mellitus) [E11.9]    HTN (hypertension) [I10]    ESRD on hemodialysis [N18.6]    Prophylactic measure [Z29.9]    Dehiscence of wound [T81.30XA]      HPI:  71 year old female with PMH of Afib (not on AC for hx of multiple falls), T2DM, HTN, HLD, ESRD on HD (MWF), CHF, CAD s/p CABG, PAD S/P R-->L bifem-fem BK pop bypass, recent fempop bypass (6/21/2022), and partial ray amputation right great toe (6/22/2022) presented today from [facility] for wound checkup.  Patient states she noticed something wrong with her surgery site a week after since she has started putting weight on it.  She was recently discharged from  in 7/7 to [facility].  c/o pain 2 weeks ago especially if she puts pressure on her right foot.  Denies fever or chills .Admitted for podiatry evaluation and septic workup ,[facility] Palliative care consult requested ,to discuss advance directives and complete MOLST Pathology Final Diagnosis  06/23/22 1. Right hallux -Acute and chronic osteomyelitis. -Gangrenous skin and soft tissue. 2. Right first metatarsal/clean margin: -Minimal chronic osteomyelitis. Patient was on iv vancomycin with dialysis at [facility] . (14 Jul 2022 19:42)      REVIEW OF SYSTEMS:    CONSTITUTIONAL: No fever, weight loss, or fatigue  EYES: No eye pain, visual disturbances, or discharge  ENMT:  No difficulty hearing, tinnitus, vertigo; No sinus or throat pain  NECK: No pain or stiffness  BREASTS: No pain, masses, or nipple discharge  RESPIRATORY: No cough, wheezing, chills or hemoptysis; No shortness of breath  CARDIOVASCULAR: No chest pain, palpitations, dizziness, or leg swelling  GASTROINTESTINAL: No abdominal or epigastric pain. No nausea, vomiting, or hematemesis; No diarrhea or constipation. No melena or hematochezia.  GENITOURINARY: No dysuria, frequency, hematuria, or incontinence  NEUROLOGICAL: No headaches, memory loss, loss of strength, numbness, or tremors  SKIN: No itching, burning, rashes, or lesions   LYMPH NODES: No enlarged glands  ENDOCRINE: No heat or cold intolerance; No hair loss  MUSCULOSKELETAL: No joint pain or swelling; No muscle, back, or extremity pain  PSYCHIATRIC: No depression, anxiety, mood swings, or difficulty sleeping  HEME/LYMPH: No easy bruising, or bleeding gums  ALLERGY AND IMMUNOLOGIC: No hives or eczema    MEDICATIONS  (STANDING):  aspirin enteric coated 81 milliGRAM(s) Oral daily  atorvastatin 40 milliGRAM(s) Oral at bedtime  calcium acetate 667 milliGRAM(s) Oral three times a day with meals  cloNIDine 0.1 milliGRAM(s) Oral two times a day  epoetin fawad-epbx (RETACRIT) Injectable 43121 Unit(s) IV Push <User Schedule>  heparin   Injectable 5000 Unit(s) SubCutaneous every 8 hours  hydrogen peroxide 3% Solution 1 Application(s) Topical once  imipramine 50 milliGRAM(s) Oral daily  lactobacillus acidophilus 1 Tablet(s) Oral two times a day  metoprolol tartrate 100 milliGRAM(s) Oral every 12 hours  Nephro-elvie 1 Tablet(s) Oral daily  pantoprazole    Tablet 40 milliGRAM(s) Oral before breakfast  piperacillin/tazobactam IVPB.. 3.375 Gram(s) IV Intermittent every 12 hours  risperiDONE   Tablet 0.5 milliGRAM(s) Oral two times a day  senna 2 Tablet(s) Oral at bedtime  zinc sulfate 220 milliGRAM(s) Oral daily    MEDICATIONS  (PRN):  acetaminophen     Tablet .. 650 milliGRAM(s) Oral every 6 hours PRN Temp greater or equal to 38C (100.4F), Mild Pain (1 - 3)  aluminum hydroxide/magnesium hydroxide/simethicone Suspension 30 milliLiter(s) Oral every 4 hours PRN Dyspepsia  bisacodyl Suppository 10 milliGRAM(s) Rectal daily PRN Constipation  melatonin 3 milliGRAM(s) Oral at bedtime PRN Insomnia  ondansetron Injectable 4 milliGRAM(s) IV Push every 8 hours PRN Nausea and/or Vomiting  traMADol 50 milliGRAM(s) Oral three times a day PRN Moderate Pain (4 - 6)      ALLERGIES: latex (Unknown)  No Known Drug Allergies      FAMILY HISTORY:      PHYSICAL EXAMINATION:  -----------------------------  T(C): 37.1 (07-16-22 @ 04:35), Max: 37.1 (07-16-22 @ 04:35)  HR: 86 (07-16-22 @ 06:43) (73 - 96)  BP: 145/60 (07-16-22 @ 06:43) (113/67 - 162/62)  RR: 18 (07-16-22 @ 04:35) (16 - 18)  SpO2: 96% (07-16-22 @ 04:35) (96% - 100%)  Wt(kg): --    07-15 @ 07:01  -  07-16 @ 07:00  --------------------------------------------------------  IN:  Total IN: 0 mL    OUT:    Other (mL): 1500 mL  Total OUT: 1500 mL    Total NET: -1500 mL            VITALS  T(C): 37.1 (07-16-22 @ 04:35), Max: 37.1 (07-16-22 @ 04:35)  HR: 86 (07-16-22 @ 06:43) (73 - 96)  BP: 145/60 (07-16-22 @ 06:43) (113/67 - 162/62)  RR: 18 (07-16-22 @ 04:35) (16 - 18)  SpO2: 96% (07-16-22 @ 04:35) (96% - 100%)    Constitutional: well developed, normal appearance, well groomed, well nourished, no deformities and no acute distress.   Eyes: the conjunctiva exhibited no abnormalities and the eyelids demonstrated no xanthelasmas.   HEENT: normal oral mucosa, no oral pallor and no oral cyanosis.   Neck: normal jugular venous A waves present, normal jugular venous V waves present and no jugular venous wilson A waves.   Pulmonary: no respiratory distress, normal respiratory rhythm and effort, no accessory muscle use and lungs were clear to auscultation bilaterally.   Cardiovascular: heart rate and rhythm were normal, normal S1 and S2 and no murmur, gallop, rub, heave or thrill are present.   Abdomen: soft, non-tender, no hepato-splenomegaly and no abdominal mass palpated.   Musculoskeletal: the gait could not be assessed..   Extremities: no clubbing of the fingernails, no localized cyanosis, no petechial hemorrhages and no ischemic changes.   Skin: normal skin color and pigmentation, no rash, no venous stasis, no skin lesions, no skin ulcer and no xanthoma was observed.   Psychiatric: oriented to person, place, and time, the affect was normal, the mood was normal and not feeling anxious.   ESR 39 and CRP 14  MRSA PCR negative   6/21 s/p Femoral popliteal bypass with prosthetic graft  6/23 s/p R 1st toe ray amputation, Wound culture from OR with MSSA and enterococcus fecalis   Pathology showed OM in margines so need 6 weeks treatment from the start   Was on Unasyn to complete 6 weeks on 7/26.   No leukocytosis or fever on admission     Recommendations:  - Will follow blood cultures   - Wound culture has been sent x 2 , will follow   - Vascular surgery and podiatry consults   - Based on old cultures will continue zosyn or unasyn until wound culture is back  LABS:   --------  07-15    139  |  100  |  47<H>  ----------------------------<  145<H>  4.4   |  32<H>  |  5.80<H>    Ca    9.5      15 Jul 2022 06:12  Mg     2.4     07-15    TPro  6.1  /  Alb  2.3<L>  /  TBili  0.3  /  DBili  x   /  AST  20  /  ALT  14  /  AlkPhos  109  07-15                         9.0    8.51  )-----------( 395      ( 15 Jul 2022 06:12 )             29.4     PT/INR - ( 15 Jul 2022 06:12 )   PT: 10.7 sec;   INR: 0.92 ratio         PTT - ( 14 Jul 2022 15:15 )  PTT:27.8 sec      174 mg/dL, --, 53 mg/dL, 170 mg/dL<H>    Culture Results:   No growth to date. (07-14 @ 15:15)  Culture Results:   No growth to date. (07-14 @ 15:10)      25 minutes aggregate time was spent on this visit, 50% visit time spent in care co-ordination with other attendings and counselling patient .I have discussed care plan with patient / HCP/family member ,who expressed understanding of problems treatment and their effect and side effects, alternatives in details. I have asked if they have any questions and concerns and appropriately addressed them to best of my ability.

## 2022-07-15 NOTE — PROGRESS NOTE ADULT - SUBJECTIVE AND OBJECTIVE BOX
Patient is a 71y Female whom presented to the hospital with esrd on hd     PAST MEDICAL & SURGICAL HISTORY:  Diabetes mellitus II      HTN (hypertension)      h/o Anxiety attack      Depression      h/o Myocardial infarct 2007      CAD (coronary artery disease)      h/o Hepatitis A 1969  currently resolved, no symptoms      PAD (peripheral artery disease)      Murmur, cardiac      h/o Smoking  quitted 3/2012      CRF (chronic renal failure), unspecified stage      Dialysis patient      Anemia secondary to renal failure      HTN (hypertension)      Osteomyelitis      ESRD on dialysis      Falls      Ataxia      coronary stent 2007      s/p Ovarian cyst removal      s/p surgical removal of benign Skin lesion epigastric area      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          MEDICATIONS  (STANDING):  aspirin enteric coated 81 milliGRAM(s) Oral daily  atorvastatin 40 milliGRAM(s) Oral at bedtime  calcium acetate 667 milliGRAM(s) Oral three times a day with meals  cloNIDine 0.1 milliGRAM(s) Oral two times a day  imipramine 50 milliGRAM(s) Oral daily  lactobacillus acidophilus 1 Tablet(s) Oral two times a day  metoprolol tartrate 100 milliGRAM(s) Oral every 12 hours  Nephro-elvie 1 Tablet(s) Oral daily  pantoprazole    Tablet 40 milliGRAM(s) Oral before breakfast  risperiDONE   Tablet 0.5 milliGRAM(s) Oral two times a day  senna 2 Tablet(s) Oral at bedtime  zinc sulfate 220 milliGRAM(s) Oral daily      Allergies    latex (Unknown)  No Known Drug Allergies    Intolerances        SOCIAL HISTORY:  Denies ETOh,Smoking,     FAMILY HISTORY:      REVIEW OF SYSTEMS:    CONSTITUTIONAL: No weakness, fevers or chills  RESPIRATORY: No cough, wheezing, hemoptysis; No shortness of breath  CARDIOVASCULAR: No chest pain or palpitations  GASTROINTESTINAL: No abdominal or epigastric pain. No nausea, vomiting,     No diarrhea or constipation. No melena   SKIN: dry                              9.0    8.51  )-----------( 395      ( 15 Jul 2022 06:12 )             29.4       CBC Full  -  ( 15 Jul 2022 06:12 )  WBC Count : 8.51 K/uL  RBC Count : 3.11 M/uL  Hemoglobin : 9.0 g/dL  Hematocrit : 29.4 %  Platelet Count - Automated : 395 K/uL  Mean Cell Volume : 94.5 fl  Mean Cell Hemoglobin : 28.9 pg  Mean Cell Hemoglobin Concentration : 30.6 gm/dL  Auto Neutrophil # : 5.15 K/uL  Auto Lymphocyte # : 1.45 K/uL  Auto Monocyte # : 1.34 K/uL  Auto Eosinophil # : 0.34 K/uL  Auto Basophil # : 0.12 K/uL  Auto Neutrophil % : 60.6 %  Auto Lymphocyte % : 17.0 %  Auto Monocyte % : 15.7 %  Auto Eosinophil % : 4.0 %  Auto Basophil % : 1.4 %      07-15    139  |  100  |  47<H>  ----------------------------<  145<H>  4.4   |  32<H>  |  5.80<H>    Ca    9.5      15 Jul 2022 06:12  Mg     2.4     07-15    TPro  6.1  /  Alb  2.3<L>  /  TBili  0.3  /  DBili  x   /  AST  20  /  ALT  14  /  AlkPhos  109  07-15      CAPILLARY BLOOD GLUCOSE      POCT Blood Glucose.: 142 mg/dL (15 Jul 2022 17:21)      Vital Signs Last 24 Hrs  T(C): 36.8 (15 Jul 2022 20:31), Max: 36.8 (15 Jul 2022 15:05)  T(F): 98.3 (15 Jul 2022 20:31), Max: 98.3 (15 Jul 2022 20:31)  HR: 96 (15 Jul 2022 20:31) (68 - 96)  BP: 113/67 (15 Jul 2022 20:31) (113/67 - 159/69)  BP(mean): --  RR: 18 (15 Jul 2022 20:31) (16 - 18)  SpO2: 98% (15 Jul 2022 20:31) (98% - 100%)    Parameters below as of 15 Jul 2022 20:31  Patient On (Oxygen Delivery Method): room air            PT/INR - ( 15 Jul 2022 06:12 )   PT: 10.7 sec;   INR: 0.92 ratio         PTT - ( 14 Jul 2022 15:15 )  PTT:27.8 sec    PHYSICAL EXAM:    Constitutional: NAD  HEENT: conjunctive   clear   Neck:  No JVD  Respiratory: CTAB  Cardiovascular: S1 and S2  Gastrointestinal: BS+, soft, NT/ND  Extremities: right foot dressing

## 2022-07-15 NOTE — PROGRESS NOTE ADULT - SUBJECTIVE AND OBJECTIVE BOX
Patient is a 71y Female with a known history of : admitted with rt big toe dehicense - s/p partial amputation of rt big toe  Cellulitis [L03.90]    Acute on chronic osteomyelitis [M86.10]    DM (diabetes mellitus) [E11.9]    HTN (hypertension) [I10]    ESRD on hemodialysis [N18.6]    Prophylactic measure [Z29.9]      HPI:  71 year old female with PMH of Afib (not on AC for hx of multiple falls), T2DM, HTN, HLD, ESRD on HD (MWF), CHF, CAD s/p CABG, PAD S/P R-->L bifem-fem BK pop bypass, recent fempop bypass (6/21/2022), and partial ray amputation right great toe (6/22/2022) presented today from [facility] for wound checkup.  Patient states she noticed something wrong with her surgery site a week after since she has started putting weight on it.  She was recently discharged from  in 7/7 to [facility].  c/o pain 2 weeks ago especially if she puts pressure on her right foot.  Denies fever or chills .Admitted for podiatry evaluation and septic workup ,ID cons Palliative care consult requested ,to discuss advance directives and complete MOLST Pathology Final Diagnosis  06/23/22 1. Right hallux -Acute and chronic osteomyelitis. -Gangrenous skin and soft tissue. 2. Right first metatarsal/clean margin: -Minimal chronic osteomyelitis. Patient was on iv vancomycin with dialysis at [facility] . (14 Jul 2022 19:42)      REVIEW OF SYSTEMS:    CONSTITUTIONAL: No fever, weight loss, or fatigue  EYES: No eye pain, visual disturbances, or discharge  ENMT:  No difficulty hearing, tinnitus, vertigo; No sinus or throat pain  NECK: No pain or stiffness  BREASTS: No pain, masses, or nipple discharge  RESPIRATORY: No cough, wheezing, chills or hemoptysis; No shortness of breath  CARDIOVASCULAR: No chest pain, palpitations, dizziness, or leg swelling  GASTROINTESTINAL: No abdominal or epigastric pain. No nausea, vomiting, or hematemesis; No diarrhea or constipation. No melena or hematochezia.  GENITOURINARY: No dysuria, frequency, hematuria, or incontinence  NEUROLOGICAL: No headaches, memory loss, loss of strength, numbness, or tremors  SKIN: No itching, burning, rashes, or lesions   LYMPH NODES: No enlarged glands  ENDOCRINE: No heat or cold intolerance; No hair loss  MUSCULOSKELETAL: No joint pain or swelling; No muscle, back, or extremity pain  PSYCHIATRIC: No depression, anxiety, mood swings, or difficulty sleeping  HEME/LYMPH: No easy bruising, or bleeding gums  ALLERGY AND IMMUNOLOGIC: No hives or eczema    MEDICATIONS  (STANDING):  aspirin enteric coated 81 milliGRAM(s) Oral daily  atorvastatin 40 milliGRAM(s) Oral at bedtime  calcium acetate 667 milliGRAM(s) Oral three times a day with meals  cloNIDine 0.1 milliGRAM(s) Oral two times a day  heparin   Injectable 5000 Unit(s) SubCutaneous every 8 hours  imipramine 50 milliGRAM(s) Oral daily  lactobacillus acidophilus 1 Tablet(s) Oral two times a day  metoprolol tartrate 100 milliGRAM(s) Oral every 12 hours  Nephro-elvie 1 Tablet(s) Oral daily  pantoprazole    Tablet 40 milliGRAM(s) Oral before breakfast  piperacillin/tazobactam IVPB.. 3.375 Gram(s) IV Intermittent every 12 hours  risperiDONE   Tablet 0.5 milliGRAM(s) Oral two times a day  senna 2 Tablet(s) Oral at bedtime  zinc sulfate 220 milliGRAM(s) Oral daily    MEDICATIONS  (PRN):  acetaminophen     Tablet .. 650 milliGRAM(s) Oral every 6 hours PRN Temp greater or equal to 38C (100.4F), Mild Pain (1 - 3)  aluminum hydroxide/magnesium hydroxide/simethicone Suspension 30 milliLiter(s) Oral every 4 hours PRN Dyspepsia  bisacodyl Suppository 10 milliGRAM(s) Rectal daily PRN Constipation  melatonin 3 milliGRAM(s) Oral at bedtime PRN Insomnia  ondansetron Injectable 4 milliGRAM(s) IV Push every 8 hours PRN Nausea and/or Vomiting  traMADol 50 milliGRAM(s) Oral three times a day PRN Moderate Pain (4 - 6)      ALLERGIES: latex (Unknown)  No Known Drug Allergies      FAMILY HISTORY:      PHYSICAL EXAMINATION:  -----------------------------  T(C): 36.5 (07-15-22 @ 05:45), Max: 36.8 (07-14-22 @ 16:07)  HR: 68 (07-15-22 @ 05:45) (68 - 78)  BP: 137/62 (07-15-22 @ 05:45) (136/70 - 137/72)  RR: 16 (07-15-22 @ 05:45) (16 - 19)  SpO2: 100% (07-15-22 @ 05:45) (99% - 100%)  Wt(kg): --    Height (cm): 175.3 (07-14 @ 16:07)    VITALS  T(C): 36.5 (07-15-22 @ 05:45), Max: 36.8 (07-14-22 @ 16:07)  HR: 68 (07-15-22 @ 05:45) (68 - 78)  BP: 137/62 (07-15-22 @ 05:45) (136/70 - 137/72)  RR: 16 (07-15-22 @ 05:45) (16 - 19)  SpO2: 100% (07-15-22 @ 05:45) (99% - 100%)    Constitutional: well developed, normal appearance, well groomed, well nourished, no deformities and no acute distress.   Eyes: the conjunctiva exhibited no abnormalities and the eyelids demonstrated no xanthelasmas.   HEENT: normal oral mucosa, no oral pallor and no oral cyanosis.   Neck: normal jugular venous A waves present, normal jugular venous V waves present and no jugular venous wilson A waves.   Pulmonary: no respiratory distress, normal respiratory rhythm and effort, no accessory muscle use and lungs were clear to auscultation bilaterally.   Cardiovascular: heart rate and rhythm were normal, normal S1 and S2 and no murmur, gallop, rub, heave or thrill are present.   Abdomen: soft, non-tender, no hepato-splenomegaly and no abdominal mass palpated.   Musculoskeletal: the gait could not be assessed..   Extremities: no clubbing of the fingernails, no localized cyanosis, no petechial hemorrhages and no ischemic changes.   Skin: normal skin color and pigmentation, no rash, no venous stasis, no skin lesions, no skin ulcer and no xanthoma was observed.   Psychiatric: oriented to person, place, and time, the affect was normal, the mood was normal and not feeling anxious.     LABS:   --------  07-14    138  |  96  |  40<H>  ----------------------------<  153<H>  3.3<L>   |  36<H>  |  5.20<H>    Ca    9.9      14 Jul 2022 15:15  Mg     2.4     07-15    TPro  7.1  /  Alb  2.8<L>  /  TBili  0.3  /  DBili  x   /  AST  24  /  ALT  17  /  AlkPhos  120  07-14                         9.0    8.51  )-----------( 395      ( 15 Jul 2022 06:12 )             29.4     PT/INR - ( 15 Jul 2022 06:12 )   PT: 10.7 sec;   INR: 0.92 ratio         PTT - ( 14 Jul 2022 15:15 )  PTT:27.8 sec            RADIOLOGY:  -----------------    ECG:     ECHO:

## 2022-07-15 NOTE — PATIENT PROFILE ADULT - FALL HARM RISK - HARM RISK INTERVENTIONS
Assistance with ambulation/Assistance OOB with selected safe patient handling equipment/Communicate Risk of Fall with Harm to all staff/Discuss with provider need for PT consult/Monitor for mental status changes/Monitor gait and stability/Move patient closer to nurses' station/Provide patient with walking aids - walker, cane, crutches/Reinforce activity limits and safety measures with patient and family/Reorient to person, place and time as needed/Tailored Fall Risk Interventions/Toileting schedule using arm’s reach rule for commode and bathroom/Use of alarms - bed, chair and/or voice tab/Visual Cue: Yellow wristband and red socks/Bed in lowest position, wheels locked, appropriate side rails in place/Call bell, personal items and telephone in reach/Instruct patient to call for assistance before getting out of bed or chair/Non-slip footwear when patient is out of bed/[place] to call system/Physically safe environment - no spills, clutter or unnecessary equipment/Purposeful Proactive Rounding/Room/bathroom lighting operational, light cord in reach

## 2022-07-15 NOTE — CONSULT NOTE ADULT - ASSESSMENT
71 year old female with PMH of Afib (not on AC for hx of multiple falls), T2DM, HTN, HLD, ESRD on HD (MWF), CHF, CAD s/p CABG, PAD S/P R-->L bifem-fem BK pop bypass, recent fempop bypass (6/21/2022), and partial ray amputation right great toe (6/22/2022) seen for dehisced surgical site to the right foot, 1st ray

## 2022-07-16 PROCEDURE — 99232 SBSQ HOSP IP/OBS MODERATE 35: CPT

## 2022-07-16 RX ORDER — AMOXICILLIN 250 MG/5ML
500 SUSPENSION, RECONSTITUTED, ORAL (ML) ORAL THREE TIMES A DAY
Refills: 0 | Status: DISCONTINUED | OUTPATIENT
Start: 2022-07-16 | End: 2022-07-16

## 2022-07-16 RX ADMIN — RISPERIDONE 0.5 MILLIGRAM(S): 4 TABLET ORAL at 18:39

## 2022-07-16 RX ADMIN — PANTOPRAZOLE SODIUM 40 MILLIGRAM(S): 20 TABLET, DELAYED RELEASE ORAL at 06:39

## 2022-07-16 RX ADMIN — Medication 100 MILLIGRAM(S): at 18:39

## 2022-07-16 RX ADMIN — Medication 1 TABLET(S): at 06:40

## 2022-07-16 RX ADMIN — Medication 667 MILLIGRAM(S): at 09:10

## 2022-07-16 RX ADMIN — Medication 81 MILLIGRAM(S): at 12:57

## 2022-07-16 RX ADMIN — Medication 650 MILLIGRAM(S): at 23:00

## 2022-07-16 RX ADMIN — Medication 667 MILLIGRAM(S): at 12:57

## 2022-07-16 RX ADMIN — Medication 1 TABLET(S): at 18:39

## 2022-07-16 RX ADMIN — Medication 0.1 MILLIGRAM(S): at 18:39

## 2022-07-16 RX ADMIN — ZINC SULFATE TAB 220 MG (50 MG ZINC EQUIVALENT) 220 MILLIGRAM(S): 220 (50 ZN) TAB at 12:57

## 2022-07-16 RX ADMIN — PIPERACILLIN AND TAZOBACTAM 25 GRAM(S): 4; .5 INJECTION, POWDER, LYOPHILIZED, FOR SOLUTION INTRAVENOUS at 06:40

## 2022-07-16 RX ADMIN — PIPERACILLIN AND TAZOBACTAM 25 GRAM(S): 4; .5 INJECTION, POWDER, LYOPHILIZED, FOR SOLUTION INTRAVENOUS at 18:39

## 2022-07-16 RX ADMIN — RISPERIDONE 0.5 MILLIGRAM(S): 4 TABLET ORAL at 06:39

## 2022-07-16 RX ADMIN — Medication 667 MILLIGRAM(S): at 18:39

## 2022-07-16 RX ADMIN — SENNA PLUS 2 TABLET(S): 8.6 TABLET ORAL at 21:51

## 2022-07-16 RX ADMIN — ATORVASTATIN CALCIUM 40 MILLIGRAM(S): 80 TABLET, FILM COATED ORAL at 21:50

## 2022-07-16 RX ADMIN — Medication 0.1 MILLIGRAM(S): at 06:40

## 2022-07-16 RX ADMIN — HEPARIN SODIUM 5000 UNIT(S): 5000 INJECTION INTRAVENOUS; SUBCUTANEOUS at 06:39

## 2022-07-16 RX ADMIN — HEPARIN SODIUM 5000 UNIT(S): 5000 INJECTION INTRAVENOUS; SUBCUTANEOUS at 21:51

## 2022-07-16 RX ADMIN — Medication 3 MILLIGRAM(S): at 21:50

## 2022-07-16 RX ADMIN — Medication 100 MILLIGRAM(S): at 06:39

## 2022-07-16 RX ADMIN — Medication 1 TABLET(S): at 12:57

## 2022-07-16 RX ADMIN — Medication 50 MILLIGRAM(S): at 12:57

## 2022-07-16 RX ADMIN — HEPARIN SODIUM 5000 UNIT(S): 5000 INJECTION INTRAVENOUS; SUBCUTANEOUS at 13:07

## 2022-07-16 NOTE — PROGRESS NOTE ADULT - SUBJECTIVE AND OBJECTIVE BOX
Patient is a 71y Female with a known history of :  Cellulitis [L03.90]    Acute on chronic osteomyelitis [M86.10]    DM (diabetes mellitus) [E11.9]    HTN (hypertension) [I10]    ESRD on hemodialysis [N18.6]    Prophylactic measure [Z29.9]    Dehiscence of wound [T81.30XA]      HPI:  71 year old female with PMH of Afib (not on AC for hx of multiple falls), T2DM, HTN, HLD, ESRD on HD (MWF), CHF, CAD s/p CABG, PAD S/P R-->L bifem-fem BK pop bypass, recent fempop bypass (6/21/2022), and partial ray amputation right great toe (6/22/2022) presented today from [facility] for wound checkup.  Patient states she noticed something wrong with her surgery site a week after since she has started putting weight on it.  She was recently discharged from  in 7/7 to [facility].  c/o pain 2 weeks ago especially if she puts pressure on her right foot.  Denies fever or chills .Admitted for podiatry evaluation and septic workup ,[facility] Palliative care consult requested ,to discuss advance directives and complete MOLST Pathology Final Diagnosis  06/23/22 1. Right hallux -Acute and chronic osteomyelitis. -Gangrenous skin and soft tissue. 2. Right first metatarsal/clean margin: -Minimal chronic osteomyelitis. Patient was on iv vancomycin with dialysis at [facility] . (14 Jul 2022 19:42)      REVIEW OF SYSTEMS:    CONSTITUTIONAL: No fever, weight loss, or fatigue  EYES: No eye pain, visual disturbances, or discharge  ENMT:  No difficulty hearing, tinnitus, vertigo; No sinus or throat pain  NECK: No pain or stiffness  BREASTS: No pain, masses, or nipple discharge  RESPIRATORY: No cough, wheezing, chills or hemoptysis; No shortness of breath  CARDIOVASCULAR: No chest pain, palpitations, dizziness, or leg swelling  GASTROINTESTINAL: No abdominal or epigastric pain. No nausea, vomiting, or hematemesis; No diarrhea or constipation. No melena or hematochezia.  GENITOURINARY: No dysuria, frequency, hematuria, or incontinence  NEUROLOGICAL: No headaches, memory loss, loss of strength, numbness, or tremors  SKIN: No itching, burning, rashes, or lesions   LYMPH NODES: No enlarged glands  ENDOCRINE: No heat or cold intolerance; No hair loss  MUSCULOSKELETAL: No joint pain or swelling; No muscle, back, or extremity pain  PSYCHIATRIC: No depression, anxiety, mood swings, or difficulty sleeping  HEME/LYMPH: No easy bruising, or bleeding gums  ALLERGY AND IMMUNOLOGIC: No hives or eczema    MEDICATIONS  (STANDING):  aspirin enteric coated 81 milliGRAM(s) Oral daily  atorvastatin 40 milliGRAM(s) Oral at bedtime  calcium acetate 667 milliGRAM(s) Oral three times a day with meals  cloNIDine 0.1 milliGRAM(s) Oral two times a day  epoetin fawad-epbx (RETACRIT) Injectable 10818 Unit(s) IV Push <User Schedule>  heparin   Injectable 5000 Unit(s) SubCutaneous every 8 hours  hydrogen peroxide 3% Solution 1 Application(s) Topical once  imipramine 50 milliGRAM(s) Oral daily  lactobacillus acidophilus 1 Tablet(s) Oral two times a day  metoprolol tartrate 100 milliGRAM(s) Oral every 12 hours  Nephro-elvie 1 Tablet(s) Oral daily  pantoprazole    Tablet 40 milliGRAM(s) Oral before breakfast  piperacillin/tazobactam IVPB.. 3.375 Gram(s) IV Intermittent every 12 hours  risperiDONE   Tablet 0.5 milliGRAM(s) Oral two times a day  senna 2 Tablet(s) Oral at bedtime  zinc sulfate 220 milliGRAM(s) Oral daily    MEDICATIONS  (PRN):  acetaminophen     Tablet .. 650 milliGRAM(s) Oral every 6 hours PRN Temp greater or equal to 38C (100.4F), Mild Pain (1 - 3)  aluminum hydroxide/magnesium hydroxide/simethicone Suspension 30 milliLiter(s) Oral every 4 hours PRN Dyspepsia  bisacodyl Suppository 10 milliGRAM(s) Rectal daily PRN Constipation  melatonin 3 milliGRAM(s) Oral at bedtime PRN Insomnia  ondansetron Injectable 4 milliGRAM(s) IV Push every 8 hours PRN Nausea and/or Vomiting  traMADol 50 milliGRAM(s) Oral three times a day PRN Moderate Pain (4 - 6)      ALLERGIES: latex (Unknown)  No Known Drug Allergies      FAMILY HISTORY:      PHYSICAL EXAMINATION:  -----------------------------  T(C): 37.1 (07-16-22 @ 04:35), Max: 37.1 (07-16-22 @ 04:35)  HR: 86 (07-16-22 @ 06:43) (73 - 96)  BP: 145/60 (07-16-22 @ 06:43) (113/67 - 162/62)  RR: 18 (07-16-22 @ 04:35) (16 - 18)  SpO2: 96% (07-16-22 @ 04:35) (96% - 100%)  Wt(kg): --    07-15 @ 07:01  -  07-16 @ 07:00  --------------------------------------------------------  IN:  Total IN: 0 mL    OUT:    Other (mL): 1500 mL  Total OUT: 1500 mL    Total NET: -1500 mL            VITALS  T(C): 37.1 (07-16-22 @ 04:35), Max: 37.1 (07-16-22 @ 04:35)  HR: 86 (07-16-22 @ 06:43) (73 - 96)  BP: 145/60 (07-16-22 @ 06:43) (113/67 - 162/62)  RR: 18 (07-16-22 @ 04:35) (16 - 18)  SpO2: 96% (07-16-22 @ 04:35) (96% - 100%)    Constitutional: well developed, normal appearance, well groomed, well nourished, no deformities and no acute distress.   Eyes: the conjunctiva exhibited no abnormalities and the eyelids demonstrated no xanthelasmas.   HEENT: normal oral mucosa, no oral pallor and no oral cyanosis.   Neck: normal jugular venous A waves present, normal jugular venous V waves present and no jugular venous wilson A waves.   Pulmonary: no respiratory distress, normal respiratory rhythm and effort, no accessory muscle use and lungs were clear to auscultation bilaterally.   Cardiovascular: heart rate and rhythm were normal, normal S1 and S2 and no murmur, gallop, rub, heave or thrill are present.   Abdomen: soft, non-tender, no hepato-splenomegaly and no abdominal mass palpated.   Musculoskeletal: the gait could not be assessed..   Extremities: no clubbing of the fingernails, no localized cyanosis, no petechial hemorrhages and no ischemic changes.   Skin: normal skin color and pigmentation, no rash, no venous stasis, no skin lesions, no skin ulcer and no xanthoma was observed.   Psychiatric: oriented to person, place, and time, the affect was normal, the mood was normal and not feeling anxious.     LABS:   --------  07-15    139  |  100  |  47<H>  ----------------------------<  145<H>  4.4   |  32<H>  |  5.80<H>    Ca    9.5      15 Jul 2022 06:12  Mg     2.4     07-15    TPro  6.1  /  Alb  2.3<L>  /  TBili  0.3  /  DBili  x   /  AST  20  /  ALT  14  /  AlkPhos  109  07-15                         9.0    8.51  )-----------( 395      ( 15 Jul 2022 06:12 )             29.4     PT/INR - ( 15 Jul 2022 06:12 )   PT: 10.7 sec;   INR: 0.92 ratio         PTT - ( 14 Jul 2022 15:15 )  PTT:27.8 sec      174 mg/dL, --, 53 mg/dL, 170 mg/dL<H>    Culture Results:   No growth to date. (07-14 @ 15:15)  Culture Results:   No growth to date. (07-14 @ 15:10)      RADIOLOGY:  -----------------    ECG:     ECHO:

## 2022-07-16 NOTE — DIETITIAN INITIAL EVALUATION ADULT - ADD RECOMMEND
as po intake improves, consider adding consistent carb, renal diet restriction.   honor pt's food preferences  po intake encouraged.

## 2022-07-16 NOTE — DIETITIAN NUTRITION RISK NOTIFICATION - TREATMENT: THE FOLLOWING DIET HAS BEEN RECOMMENDED
Diet, DASH/TLC:   Sodium & Cholesterol Restricted  Consistent Carbohydrate {Evening Snack}  Soft and Bite Sized (SOFTBTSZ)  Mildly Thick Liquids (MILDTHICKLIQS)  For patients receiving Renal Replacement - No Protein Restr, No Conc K, No Conc Phos, Low Sodium (RENAL)  Supplement Feeding Modality:  Oral  Glucerna Shake Cans or Servings Per Day:  1       Frequency:  Daily (07-16-22 @ 07:40) [Active]

## 2022-07-16 NOTE — DIETITIAN INITIAL EVALUATION ADULT - PERTINENT MEDS FT
MEDICATIONS  (STANDING):  aspirin enteric coated 81 milliGRAM(s) Oral daily  atorvastatin 40 milliGRAM(s) Oral at bedtime  calcium acetate 667 milliGRAM(s) Oral three times a day with meals  cloNIDine 0.1 milliGRAM(s) Oral two times a day  epoetin fawad-epbx (RETACRIT) Injectable 14179 Unit(s) IV Push <User Schedule>  heparin   Injectable 5000 Unit(s) SubCutaneous every 8 hours  hydrogen peroxide 3% Solution 1 Application(s) Topical once  imipramine 50 milliGRAM(s) Oral daily  lactobacillus acidophilus 1 Tablet(s) Oral two times a day  metoprolol tartrate 100 milliGRAM(s) Oral every 12 hours  Nephro-elvie 1 Tablet(s) Oral daily  pantoprazole    Tablet 40 milliGRAM(s) Oral before breakfast  piperacillin/tazobactam IVPB.. 3.375 Gram(s) IV Intermittent every 12 hours  risperiDONE   Tablet 0.5 milliGRAM(s) Oral two times a day  senna 2 Tablet(s) Oral at bedtime  zinc sulfate 220 milliGRAM(s) Oral daily    MEDICATIONS  (PRN):  acetaminophen     Tablet .. 650 milliGRAM(s) Oral every 6 hours PRN Temp greater or equal to 38C (100.4F), Mild Pain (1 - 3)  aluminum hydroxide/magnesium hydroxide/simethicone Suspension 30 milliLiter(s) Oral every 4 hours PRN Dyspepsia  bisacodyl Suppository 10 milliGRAM(s) Rectal daily PRN Constipation  melatonin 3 milliGRAM(s) Oral at bedtime PRN Insomnia  ondansetron Injectable 4 milliGRAM(s) IV Push every 8 hours PRN Nausea and/or Vomiting  traMADol 50 milliGRAM(s) Oral three times a day PRN Moderate Pain (4 - 6)

## 2022-07-16 NOTE — PROGRESS NOTE ADULT - SUBJECTIVE AND OBJECTIVE BOX
Chief complaint and [email or web address] No new issues reported by medical staff . All above noted Patient is resting in a bed comfortably .Confused ,poor mentation .No distress noted   INTERVAL HPI/OVERNIGHT EVENTS:  T(C): 37.1 (07-16-22 @ 04:35), Max: 37.1 (07-16-22 @ 04:35)  HR: 86 (07-16-22 @ 06:43) (73 - 96)  BP: 145/60 (07-16-22 @ 06:43) (113/67 - 162/62)  RR: 18 (07-16-22 @ 04:35) (16 - 18)  SpO2: 96% (07-16-22 @ 04:35) (96% - 100%)  Wt(kg): --  I&O's Summary    15 Jul 2022 07:01  -  16 Jul 2022 07:00  --------------------------------------------------------  IN: 0 mL / OUT: 1500 mL / NET: -1500 mL    16 Jul 2022 07:01  -  16 Jul 2022 12:07  --------------------------------------------------------  IN: 240 mL / OUT: 0 mL / NET: 240 mL        LABS:                        9.0    8.51  )-----------( 395      ( 15 Jul 2022 06:12 )             29.4     07-15    139  |  100  |  47<H>  ----------------------------<  145<H>  4.4   |  32<H>  |  5.80<H>    Ca    9.5      15 Jul 2022 06:12  Mg     2.4     07-15    TPro  6.1  /  Alb  2.3<L>  /  TBili  0.3  /  DBili  x   /  AST  20  /  ALT  14  /  AlkPhos  109  07-15    PT/INR - ( 15 Jul 2022 06:12 )   PT: 10.7 sec;   INR: 0.92 ratio         PTT - ( 14 Jul 2022 15:15 )  PTT:27.8 sec    CAPILLARY BLOOD GLUCOSE      POCT Blood Glucose.: 142 mg/dL (15 Jul 2022 17:21)            MEDICATIONS  (STANDING):  aspirin enteric coated 81 milliGRAM(s) Oral daily  atorvastatin 40 milliGRAM(s) Oral at bedtime  calcium acetate 667 milliGRAM(s) Oral three times a day with meals  cloNIDine 0.1 milliGRAM(s) Oral two times a day  epoetin fawad-epbx (RETACRIT) Injectable 25673 Unit(s) IV Push <User Schedule>  heparin   Injectable 5000 Unit(s) SubCutaneous every 8 hours  hydrogen peroxide 3% Solution 1 Application(s) Topical once  imipramine 50 milliGRAM(s) Oral daily  lactobacillus acidophilus 1 Tablet(s) Oral two times a day  metoprolol tartrate 100 milliGRAM(s) Oral every 12 hours  Nephro-elvie 1 Tablet(s) Oral daily  pantoprazole    Tablet 40 milliGRAM(s) Oral before breakfast  piperacillin/tazobactam IVPB.. 3.375 Gram(s) IV Intermittent every 12 hours  risperiDONE   Tablet 0.5 milliGRAM(s) Oral two times a day  senna 2 Tablet(s) Oral at bedtime  zinc sulfate 220 milliGRAM(s) Oral daily    MEDICATIONS  (PRN):  acetaminophen     Tablet .. 650 milliGRAM(s) Oral every 6 hours PRN Temp greater or equal to 38C (100.4F), Mild Pain (1 - 3)  aluminum hydroxide/magnesium hydroxide/simethicone Suspension 30 milliLiter(s) Oral every 4 hours PRN Dyspepsia  bisacodyl Suppository 10 milliGRAM(s) Rectal daily PRN Constipation  melatonin 3 milliGRAM(s) Oral at bedtime PRN Insomnia  ondansetron Injectable 4 milliGRAM(s) IV Push every 8 hours PRN Nausea and/or Vomiting  traMADol 50 milliGRAM(s) Oral three times a day PRN Moderate Pain (4 - 6)      RADIOLOGY & ADDITIONAL TESTS:    Imaging Personally Reviewed:  [ ] YES  [ ] NO    Consultant(s) Notes Reviewed:  [ ] YES  [ ] NO    REVIEW OF SYSTEMS:  CONSTITUTIONAL: No fever, weight loss, or fatigue  EYES: No eye pain, visual disturbances, or discharge  ENMT:  No difficulty hearing, tinnitus, vertigo; No sinus or throat pain  NECK: No pain or stiffness  BREASTS: No pain, masses, or nipple discharge  RESPIRATORY: No cough, wheezing, chills or hemoptysis; No shortness of breath  CARDIOVASCULAR: No chest pain, palpitations, dizziness, or leg swelling  GASTROINTESTINAL: No abdominal pain. No nausea, vomiting; No diarrhea or constipation. No melena or hematochezia.  GENITOURINARY: No dysuria, frequency, hematuria, or incontinence  NEUROLOGICAL: No headaches, memory loss, loss of strength, numbness, or tremors  SKIN: No itching, burning, rashes, or lesions   LYMPH NODES: No enlarged glands  MUSCULOSKELETAL: No joint pain or swelling; No muscle, back, or extremity pain  HEME/LYMPH: No easy bruising, or bleeding gums  ALLERY AND IMMUNOLOGIC: No hives or eczema      PHYSICAL EXAM:  GENERAL: NAD, well-groomed, well-developed  HEAD:  Atraumatic, Normocephalic  EYES: EOMI, PERRLA, conjunctiva and sclera clear  ENMT: mucosal membranes are moist  NECK: Supple, No JVD, Normal thyroid  NERVOUS SYSTEM:  Alert & Oriented X 3, Good concentration;  CHEST/LUNG: Clear to percussion bilaterally; No rales, rhonchi, wheezing, or rubs  HEART: Regular rate and rhythm; No murmurs, rubs, or gallops  ABDOMEN: Soft, Nontender, Nondistended; Bowel sounds present  EXTREMITIES:  2+ Peripheral Pulses, No clubbing, cyanosis, or edema 3 x 1 cm ulceration noted to the Right foot, 1st ray surgical site wound dehiscence, fibrogranular wound bed, no probe to bone, no periwound erythema, no fluctuance, no malodor, no proximal streaking at this time. Remainder of sutures to the 1st ray intact. seen by ID -  LYMPH: No lymphadenopathy noted  SKIN: No rashes or lesions    Care Discussed with Consultants/Other Providers [x ] YES  [ ] NO 25 minutes aggregate time was spent on this visit, 50% visit time spent in care co-ordination with other attendings and counselling patient .I have discussed care plan with patient / HCP/family member ,who expressed understanding of problems treatment and their effect and side effects, alternatives in details. I have asked if they have any questions and concerns and appropriately addressed them to best of my ability.

## 2022-07-16 NOTE — DIETITIAN INITIAL EVALUATION ADULT - NS FNS DIET ORDER
Diet, DASH/TLC:   Sodium & Cholesterol Restricted  Consistent Carbohydrate {Evening Snack}  Soft and Bite Sized (SOFTBTSZ)  Mildly Thick Liquids (MILDTHICKLIQS)  For patients receiving Renal Replacement - No Protein Restr, No Conc K, No Conc Phos, Low Sodium (RENAL)  Supplement Feeding Modality:  Oral  Glucerna Shake Cans or Servings Per Day:  1       Frequency:  Daily (07-16-22 @ 07:40)

## 2022-07-16 NOTE — PROGRESS NOTE ADULT - SUBJECTIVE AND OBJECTIVE BOX
71y year old Female seen at [facility] 1EAS 113 D1 for Right foot surgical site wound dehiscence. Pt laying in bed comfortably in no acute distress. Denies any fever, chills, nausea, vomiting, chest pain, shortness of breath, or calf pain at this time.    Allergies    latex (Unknown)  No Known Drug Allergies    Intolerances        MEDICATIONS  (STANDING):  aspirin enteric coated 81 milliGRAM(s) Oral daily  atorvastatin 40 milliGRAM(s) Oral at bedtime  calcium acetate 667 milliGRAM(s) Oral three times a day with meals  cloNIDine 0.1 milliGRAM(s) Oral two times a day  epoetin fawad-epbx (RETACRIT) Injectable 32710 Unit(s) IV Push <User Schedule>  heparin   Injectable 5000 Unit(s) SubCutaneous every 8 hours  hydrogen peroxide 3% Solution 1 Application(s) Topical once  imipramine 50 milliGRAM(s) Oral daily  lactobacillus acidophilus 1 Tablet(s) Oral two times a day  metoprolol tartrate 100 milliGRAM(s) Oral every 12 hours  Nephro-elvie 1 Tablet(s) Oral daily  pantoprazole    Tablet 40 milliGRAM(s) Oral before breakfast  piperacillin/tazobactam IVPB.. 3.375 Gram(s) IV Intermittent every 12 hours  risperiDONE   Tablet 0.5 milliGRAM(s) Oral two times a day  senna 2 Tablet(s) Oral at bedtime  zinc sulfate 220 milliGRAM(s) Oral daily    MEDICATIONS  (PRN):  acetaminophen     Tablet .. 650 milliGRAM(s) Oral every 6 hours PRN Temp greater or equal to 38C (100.4F), Mild Pain (1 - 3)  aluminum hydroxide/magnesium hydroxide/simethicone Suspension 30 milliLiter(s) Oral every 4 hours PRN Dyspepsia  bisacodyl Suppository 10 milliGRAM(s) Rectal daily PRN Constipation  melatonin 3 milliGRAM(s) Oral at bedtime PRN Insomnia  ondansetron Injectable 4 milliGRAM(s) IV Push every 8 hours PRN Nausea and/or Vomiting  traMADol 50 milliGRAM(s) Oral three times a day PRN Moderate Pain (4 - 6)      Vital Signs Last 24 Hrs  T(C): 36.6 (16 Jul 2022 13:19), Max: 37.1 (16 Jul 2022 04:35)  T(F): 97.8 (16 Jul 2022 13:19), Max: 98.8 (16 Jul 2022 04:35)  HR: 85 (16 Jul 2022 13:19) (84 - 96)  BP: 107/67 (16 Jul 2022 13:19) (107/67 - 162/62)  BP(mean): --  RR: 18 (16 Jul 2022 13:19) (16 - 18)  SpO2: 98% (16 Jul 2022 13:19) (96% - 100%)    Parameters below as of 16 Jul 2022 13:19  Patient On (Oxygen Delivery Method): room air        PHYSICAL EXAM:  Vascular: DP & PT non-palpable bilaterally, Capillary refill 3 seconds, Digital hair absent bilaterally  Neurological: Light touch sensation diminished bilaterally  Musculoskeletal: 5/5 strength in all quadrants bilaterally, AJ & STJ ROM intact  Dermatological: 3 x 1 cm ulceration noted to the Right foot, 1st ray surgical site wound dehiscence, fibrogranular wound bed, no probe to bone, no periwound erythema, no fluctuance, no malodor, no proximal streaking at this time. Remainder of sutures to the 1st ray intact.     CBC Full  -  ( 15 Jul 2022 06:12 )  WBC Count : 8.51 K/uL  RBC Count : 3.11 M/uL  Hemoglobin : 9.0 g/dL  Hematocrit : 29.4 %  Platelet Count - Automated : 395 K/uL  Mean Cell Volume : 94.5 fl  Mean Cell Hemoglobin : 28.9 pg  Mean Cell Hemoglobin Concentration : 30.6 gm/dL  Auto Neutrophil # : 5.15 K/uL  Auto Lymphocyte # : 1.45 K/uL  Auto Monocyte # : 1.34 K/uL  Auto Eosinophil # : 0.34 K/uL  Auto Basophil # : 0.12 K/uL  Auto Neutrophil % : 60.6 %  Auto Lymphocyte % : 17.0 %  Auto Monocyte % : 15.7 %  Auto Eosinophil % : 4.0 %  Auto Basophil % : 1.4 %      ----------CHEM PANEL----------    PT/INR - ( 15 Jul 2022 06:12 )   PT: 10.7 sec;   INR: 0.92 ratio               Culture - Abscess with Gram Stain (collected 15 Jul 2022 07:15)  Source: .Abscess R foot  Preliminary Report (16 Jul 2022 12:29):    Numerous Enterobacter cloacae    Numerous Klebsiella pneumoniae    Culture - Blood (collected 14 Jul 2022 15:15)  Source: .Blood Blood-Peripheral  Preliminary Report (16 Jul 2022 01:02):    No growth to date.    Culture - Blood (collected 14 Jul 2022 15:10)  Source: .Blood Blood-Peripheral  Preliminary Report (16 Jul 2022 01:02):    No growth to date.        Imaging: ----------

## 2022-07-16 NOTE — DIETITIAN INITIAL EVALUATION ADULT - OTHER INFO
72 y/o female adm with cellulitis of right lower extremity. PMH ESRD on HD, HTN, osteomyelitis, PAD, CAD, MI, depression, HTN, Type 2 DM, anxiety. Pt visited at bedside this am. Pt reports, appetite is improving. Requesting nepro TID. As per pt, current wt is 93#, # 1 year ago. Pt contributing her wt loss due to dental issues she had in the past. Ht 5'9. Visible muscle depletion noted.

## 2022-07-16 NOTE — PROGRESS NOTE ADULT - SUBJECTIVE AND OBJECTIVE BOX
Patient is a 71y Female whom presented to the hospital with esrd on hd     PAST MEDICAL & SURGICAL HISTORY:  Diabetes mellitus II      HTN (hypertension)      h/o Anxiety attack      Depression      h/o Myocardial infarct 2007      CAD (coronary artery disease)      h/o Hepatitis A 1969  currently resolved, no symptoms      PAD (peripheral artery disease)      Murmur, cardiac      h/o Smoking  quitted 3/2012      CRF (chronic renal failure), unspecified stage      Dialysis patient      Anemia secondary to renal failure      HTN (hypertension)      Osteomyelitis      ESRD on dialysis      Falls      Ataxia      coronary stent 2007      s/p Ovarian cyst removal      s/p surgical removal of benign Skin lesion epigastric area      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          MEDICATIONS  (STANDING):  aspirin enteric coated 81 milliGRAM(s) Oral daily  atorvastatin 40 milliGRAM(s) Oral at bedtime  calcium acetate 667 milliGRAM(s) Oral three times a day with meals  cloNIDine 0.1 milliGRAM(s) Oral two times a day  imipramine 50 milliGRAM(s) Oral daily  lactobacillus acidophilus 1 Tablet(s) Oral two times a day  metoprolol tartrate 100 milliGRAM(s) Oral every 12 hours  Nephro-elvie 1 Tablet(s) Oral daily  pantoprazole    Tablet 40 milliGRAM(s) Oral before breakfast  risperiDONE   Tablet 0.5 milliGRAM(s) Oral two times a day  senna 2 Tablet(s) Oral at bedtime  zinc sulfate 220 milliGRAM(s) Oral daily      Allergies    latex (Unknown)  No Known Drug Allergies    Intolerances        SOCIAL HISTORY:  Denies ETOh,Smoking,     FAMILY HISTORY:      REVIEW OF SYSTEMS:    CONSTITUTIONAL: No weakness, fevers or chills  RESPIRATORY: No cough, wheezing, hemoptysis; No shortness of breath  CARDIOVASCULAR: No chest pain or palpitations  GASTROINTESTINAL: No abdominal or epigastric pain. No nausea, vomiting,     No diarrhea or constipation. No melena   SKIN: dry                                         9.0    8.51  )-----------( 395      ( 15 Jul 2022 06:12 )             29.4       CBC Full  -  ( 15 Jul 2022 06:12 )  WBC Count : 8.51 K/uL  RBC Count : 3.11 M/uL  Hemoglobin : 9.0 g/dL  Hematocrit : 29.4 %  Platelet Count - Automated : 395 K/uL  Mean Cell Volume : 94.5 fl  Mean Cell Hemoglobin : 28.9 pg  Mean Cell Hemoglobin Concentration : 30.6 gm/dL  Auto Neutrophil # : 5.15 K/uL  Auto Lymphocyte # : 1.45 K/uL  Auto Monocyte # : 1.34 K/uL  Auto Eosinophil # : 0.34 K/uL  Auto Basophil # : 0.12 K/uL  Auto Neutrophil % : 60.6 %  Auto Lymphocyte % : 17.0 %  Auto Monocyte % : 15.7 %  Auto Eosinophil % : 4.0 %  Auto Basophil % : 1.4 %      07-15    139  |  100  |  47<H>  ----------------------------<  145<H>  4.4   |  32<H>  |  5.80<H>    Ca    9.5      15 Jul 2022 06:12  Mg     2.4     07-15    TPro  6.1  /  Alb  2.3<L>  /  TBili  0.3  /  DBili  x   /  AST  20  /  ALT  14  /  AlkPhos  109  07-15      CAPILLARY BLOOD GLUCOSE          Vital Signs Last 24 Hrs  T(C): 36.5 (16 Jul 2022 18:46), Max: 37.1 (16 Jul 2022 04:35)  T(F): 97.7 (16 Jul 2022 18:46), Max: 98.8 (16 Jul 2022 04:35)  HR: 87 (16 Jul 2022 18:46) (84 - 96)  BP: 127/74 (16 Jul 2022 18:46) (107/67 - 162/62)  BP(mean): --  RR: 18 (16 Jul 2022 18:46) (18 - 18)  SpO2: 98% (16 Jul 2022 18:46) (96% - 98%)    Parameters below as of 16 Jul 2022 18:46  Patient On (Oxygen Delivery Method): room air            PT/INR - ( 15 Jul 2022 06:12 )   PT: 10.7 sec;   INR: 0.92 ratio                            PHYSICAL EXAM:    Constitutional: NAD  HEENT: conjunctive   clear   Neck:  No JVD  Respiratory: CTAB  Cardiovascular: S1 and S2  Gastrointestinal: BS+, soft, NT/ND  Extremities: right foot dressing

## 2022-07-16 NOTE — PROGRESS NOTE ADULT - SUBJECTIVE AND OBJECTIVE BOX
[facility]  INFECTIOUS DISEASES   [address] [place] [address]  Tel: [phone]     Fax: [phone]  ======================================================  MD Zita Alejandra Kaushal, MD Cho, Michelle, MD   ======================================================    [id number]  SHILO JOSE F     Follow up: R foot wound infection     No fever or any other symptoms. No overnight event. Sitting on bed, alert and oriented.     PAST MEDICAL & SURGICAL HISTORY:  Diabetes mellitus II  HTN (hypertension)  h/o Anxiety attack  Depression  h/o Myocardial infarct 2007  CAD (coronary artery disease)  h/o Hepatitis A 1969  currently resolved, no symptoms  PAD (peripheral artery disease)  Murmur, cardiac  h/o Smoking  quitted 3/2012  CRF (chronic renal failure), unspecified stage  Dialysis patient  Anemia secondary to renal failure  HTN (hypertension)  coronary stent 2007  s/p Ovarian cyst removal  s/p surgical removal of benign Skin lesion epigastric area    Social Hx: no current smoking, ETOH or drugs     FAMILY HISTORY:  No pertinent family history in first degree relatives    Allergies  latex (Unknown)  No Known Drug Allergies    Antibiotics:  MEDICATIONS  (STANDING):  aspirin enteric coated 81 milliGRAM(s) Oral daily  atorvastatin 40 milliGRAM(s) Oral at bedtime  calcium acetate 667 milliGRAM(s) Oral three times a day with meals  cloNIDine 0.1 milliGRAM(s) Oral two times a day  heparin   Injectable 5000 Unit(s) SubCutaneous every 8 hours  imipramine 50 milliGRAM(s) Oral daily  lactobacillus acidophilus 1 Tablet(s) Oral two times a day  metoprolol tartrate 100 milliGRAM(s) Oral every 12 hours  Nephro-elvie 1 Tablet(s) Oral daily  pantoprazole    Tablet 40 milliGRAM(s) Oral before breakfast  piperacillin/tazobactam IVPB.. 3.375 Gram(s) IV Intermittent every 12 hours  risperiDONE   Tablet 0.5 milliGRAM(s) Oral two times a day  senna 2 Tablet(s) Oral at bedtime  zinc sulfate 220 milliGRAM(s) Oral daily    MEDICATIONS  (PRN):  acetaminophen     Tablet .. 650 milliGRAM(s) Oral every 6 hours PRN Temp greater or equal to 38C (100.4F), Mild Pain (1 - 3)  aluminum hydroxide/magnesium hydroxide/simethicone Suspension 30 milliLiter(s) Oral every 4 hours PRN Dyspepsia  bisacodyl Suppository 10 milliGRAM(s) Rectal daily PRN Constipation  melatonin 3 milliGRAM(s) Oral at bedtime PRN Insomnia  ondansetron Injectable 4 milliGRAM(s) IV Push every 8 hours PRN Nausea and/or Vomiting  traMADol 50 milliGRAM(s) Oral three times a day PRN Moderate Pain (4 - 6)       REVIEW OF SYSTEMS:  CONSTITUTIONAL:  No Fever or chills  HEENT:  No diplopia or blurred vision.  No sore throat or runny nose.  CARDIOVASCULAR:  No chest pain or SOB.  RESPIRATORY:  No cough, shortness of breath, PND or orthopnea.  GASTROINTESTINAL:  No nausea, vomiting or diarrhea.  GENITOURINARY:  No dysuria, frequency or urgency. No Blood in urine  MUSCULOSKELETAL:  no joint aches, no muscle pain  SKIN:  No change in skin, hair or nails.  NEUROLOGIC:  No paresthesias, fasciculations, seizures or weakness.  PSYCHIATRIC:  No disorder of thought or mood.  ENDOCRINE:  No heat or cold intolerance, polyuria or polydipsia.  HEMATOLOGICAL:  No easy bruising or bleeding.     Physical Exam:  Vital Signs Last 24 Hrs  T(C): 36.6 (16 Jul 2022 13:19), Max: 37.1 (16 Jul 2022 04:35)  T(F): 97.8 (16 Jul 2022 13:19), Max: 98.8 (16 Jul 2022 04:35)  HR: 85 (16 Jul 2022 13:19) (84 - 96)  BP: 107/67 (16 Jul 2022 13:19) (107/67 - 162/62)  BP(mean): --  RR: 18 (16 Jul 2022 13:19) (16 - 18)  SpO2: 98% (16 Jul 2022 13:19) (96% - 100%)  Parameters below as of 16 Jul 2022 13:19  Patient On (Oxygen Delivery Method): room air  GEN: NAD  HEENT: normocephalic and atraumatic. EOMI. PERRL.    NECK: Supple.  No lymphadenopathy   LUNGS: Clear to auscultation.  HEART: Regular rate and rhythm  ABDOMEN: Soft, nontender, and nondistended.  Positive bowel sounds.    : No CVA tenderness  EXTREMITIES: bypass wounds are healed, R 1st toe stump open with some purulent discharge, mild cellulitis   NEUROLOGIC: grossly intact.  PSYCHIATRIC: Appropriate affect .  SKIN: No rash       Labs:                        9.0    8.51  )-----------( 395      ( 15 Jul 2022 06:12 )             29.4     07-15    139  |  100  |  47<H>  ----------------------------<  145<H>  4.4   |  32<H>  |  5.80<H>    Ca    9.5      15 Jul 2022 06:12  Mg     2.4     07-15    TPro  6.1  /  Alb  2.3<L>  /  TBili  0.3  /  DBili  x   /  AST  20  /  ALT  14  /  AlkPhos  109  07-15    Culture - Abscess with Gram Stain (collected 07-15-22 @ 07:15)  Source: .Abscess R foot    Culture - Blood (collected 07-14-22 @ 15:15)  Source: .Blood Blood-Peripheral    Culture - Blood (collected 07-14-22 @ 15:10)  Source: .Blood Blood-Peripheral      WBC Count: 8.51 K/uL (07-15-22 @ 06:12)  WBC Count: 9.08 K/uL (07-14-22 @ 15:15)    Creatinine, Serum: 5.80 mg/dL (07-15-22 @ 06:12)  Creatinine, Serum: 5.20 mg/dL (07-14-22 @ 15:15)    C-Reactive Protein, Serum: 14 mg/L (07-14-22 @ 15:15)    Sedimentation Rate, Erythrocyte: 39 mm/hr (07-14-22 @ 15:15)    COVID-19 PCR: NotDetec (07-14-22 @ 15:15)  COVID-19 PCR: NotDetec (07-06-22 @ 07:15)  COVID-19 PCR: NotDetec (07-02-22 @ 11:50)  COVID-19 PCR: NotDetec (06-28-22 @ 14:00)  COVID-19 PCR: NotDetec (06-23-22 @ 11:42)  COVID-19 PCR: NotDetec (06-20-22 @ 08:14)    All imaging and other data have been reviewed.  < from: Xray Foot AP + Lateral + Oblique, Right (07.14.22 @ 17:02) >  IMPRESSION: Soft tissue ulcer at the amputation site around the right   first metatarsal. Clear lungs at this time.    Assessment and Plan:   72 yo woman with PMH of HTN, DM2, ESRD on HD, Afib, CHF, CAD s/p CABG, PAD S/P R-->L bifem-fem/Pop bypass, recent R fem/pop bypass on 6/21/2022 and partial ray amputation right great toe 6/23/2022   was admitted from [facility] for wound dehiscence and infection.   ESR 39 and CRP 14  MRSA PCR negative   6/21 s/p Femoral popliteal bypass with prosthetic graft  6/23 s/p R 1st toe ray amputation, Wound culture from OR with MSSA and enterococcus fecalis   Pathology showed OM in margines so need 6 weeks treatment from the start   Was on Unasyn to complete 6 weeks on 7/26.   No leukocytosis or fever on admission     Recommendations:  - Will follow blood cultures NGTD  - Wound culture has with Klebsiella and enterobacter, will follow   - Vascular surgery and podiatry consults noted   - Based on old cultures will continue zosyn until wound culture is back     Will follow.    Geovany Long MD  Division of Infectious Diseases   Please call ID service at [phone] with any question.      35 minutes spent on total encounter assessing patient, examination, chart reivew, counseling and coordinating care by the attending physician/nurse/care manager.

## 2022-07-16 NOTE — DIETITIAN INITIAL EVALUATION ADULT - PERTINENT LABORATORY DATA
07-15    139  |  100  |  47<H>  ----------------------------<  145<H>  4.4   |  32<H>  |  5.80<H>    Ca    9.5      15 Jul 2022 06:12  Mg     2.4     07-15    TPro  6.1  /  Alb  2.3<L>  /  TBili  0.3  /  DBili  x   /  AST  20  /  ALT  14  /  AlkPhos  109  07-15  POCT Blood Glucose.: 142 mg/dL (07-15-22 @ 17:21)  A1C with Estimated Average Glucose Result: 9.0 % (06-17-22 @ 10:44)  A1C with Estimated Average Glucose Result: 8.1 % (04-23-22 @ 13:17)  A1C with Estimated Average Glucose Result: 8.3 % (04-06-22 @ 09:45)

## 2022-07-17 PROCEDURE — 99232 SBSQ HOSP IP/OBS MODERATE 35: CPT

## 2022-07-17 RX ORDER — INSULIN LISPRO 100/ML
VIAL (ML) SUBCUTANEOUS AT BEDTIME
Refills: 0 | Status: DISCONTINUED | OUTPATIENT
Start: 2022-07-17 | End: 2022-07-19

## 2022-07-17 RX ORDER — SODIUM CHLORIDE 9 MG/ML
1000 INJECTION, SOLUTION INTRAVENOUS
Refills: 0 | Status: DISCONTINUED | OUTPATIENT
Start: 2022-07-17 | End: 2022-07-19

## 2022-07-17 RX ORDER — DEXTROSE 50 % IN WATER 50 %
25 SYRINGE (ML) INTRAVENOUS ONCE
Refills: 0 | Status: DISCONTINUED | OUTPATIENT
Start: 2022-07-17 | End: 2022-07-19

## 2022-07-17 RX ORDER — INSULIN LISPRO 100/ML
VIAL (ML) SUBCUTANEOUS
Refills: 0 | Status: DISCONTINUED | OUTPATIENT
Start: 2022-07-17 | End: 2022-07-19

## 2022-07-17 RX ORDER — DEXTROSE 50 % IN WATER 50 %
15 SYRINGE (ML) INTRAVENOUS ONCE
Refills: 0 | Status: DISCONTINUED | OUTPATIENT
Start: 2022-07-17 | End: 2022-07-19

## 2022-07-17 RX ORDER — GLUCAGON INJECTION, SOLUTION 0.5 MG/.1ML
1 INJECTION, SOLUTION SUBCUTANEOUS ONCE
Refills: 0 | Status: DISCONTINUED | OUTPATIENT
Start: 2022-07-17 | End: 2022-07-19

## 2022-07-17 RX ORDER — DEXTROSE 50 % IN WATER 50 %
12.5 SYRINGE (ML) INTRAVENOUS ONCE
Refills: 0 | Status: DISCONTINUED | OUTPATIENT
Start: 2022-07-17 | End: 2022-07-19

## 2022-07-17 RX ADMIN — Medication 1 TABLET(S): at 17:32

## 2022-07-17 RX ADMIN — Medication 1 TABLET(S): at 05:40

## 2022-07-17 RX ADMIN — Medication 3: at 21:19

## 2022-07-17 RX ADMIN — Medication 667 MILLIGRAM(S): at 12:25

## 2022-07-17 RX ADMIN — Medication 3: at 17:37

## 2022-07-17 RX ADMIN — HEPARIN SODIUM 5000 UNIT(S): 5000 INJECTION INTRAVENOUS; SUBCUTANEOUS at 21:19

## 2022-07-17 RX ADMIN — Medication 650 MILLIGRAM(S): at 03:50

## 2022-07-17 RX ADMIN — RISPERIDONE 0.5 MILLIGRAM(S): 4 TABLET ORAL at 17:34

## 2022-07-17 RX ADMIN — Medication 1 TABLET(S): at 12:27

## 2022-07-17 RX ADMIN — Medication 81 MILLIGRAM(S): at 12:28

## 2022-07-17 RX ADMIN — SENNA PLUS 2 TABLET(S): 8.6 TABLET ORAL at 21:19

## 2022-07-17 RX ADMIN — PIPERACILLIN AND TAZOBACTAM 25 GRAM(S): 4; .5 INJECTION, POWDER, LYOPHILIZED, FOR SOLUTION INTRAVENOUS at 17:37

## 2022-07-17 RX ADMIN — RISPERIDONE 0.5 MILLIGRAM(S): 4 TABLET ORAL at 05:41

## 2022-07-17 RX ADMIN — Medication 667 MILLIGRAM(S): at 17:31

## 2022-07-17 RX ADMIN — HEPARIN SODIUM 5000 UNIT(S): 5000 INJECTION INTRAVENOUS; SUBCUTANEOUS at 05:40

## 2022-07-17 RX ADMIN — Medication 3 MILLIGRAM(S): at 21:18

## 2022-07-17 RX ADMIN — PANTOPRAZOLE SODIUM 40 MILLIGRAM(S): 20 TABLET, DELAYED RELEASE ORAL at 05:41

## 2022-07-17 RX ADMIN — HEPARIN SODIUM 5000 UNIT(S): 5000 INJECTION INTRAVENOUS; SUBCUTANEOUS at 17:25

## 2022-07-17 RX ADMIN — Medication 100 MILLIGRAM(S): at 17:37

## 2022-07-17 RX ADMIN — Medication 0.1 MILLIGRAM(S): at 17:32

## 2022-07-17 RX ADMIN — Medication 100 MILLIGRAM(S): at 05:41

## 2022-07-17 RX ADMIN — Medication 50 MILLIGRAM(S): at 12:28

## 2022-07-17 RX ADMIN — Medication 0.1 MILLIGRAM(S): at 05:41

## 2022-07-17 RX ADMIN — Medication 6: at 12:25

## 2022-07-17 RX ADMIN — PIPERACILLIN AND TAZOBACTAM 25 GRAM(S): 4; .5 INJECTION, POWDER, LYOPHILIZED, FOR SOLUTION INTRAVENOUS at 05:41

## 2022-07-17 RX ADMIN — ATORVASTATIN CALCIUM 40 MILLIGRAM(S): 80 TABLET, FILM COATED ORAL at 21:18

## 2022-07-17 RX ADMIN — ZINC SULFATE TAB 220 MG (50 MG ZINC EQUIVALENT) 220 MILLIGRAM(S): 220 (50 ZN) TAB at 12:27

## 2022-07-17 NOTE — PROGRESS NOTE ADULT - SUBJECTIVE AND OBJECTIVE BOX
Patient is a 71y Female whom presented to the hospital with esrd on hd     PAST MEDICAL & SURGICAL HISTORY:  Diabetes mellitus II      HTN (hypertension)      h/o Anxiety attack      Depression      h/o Myocardial infarct 2007      CAD (coronary artery disease)      h/o Hepatitis A 1969  currently resolved, no symptoms      PAD (peripheral artery disease)      Murmur, cardiac      h/o Smoking  quitted 3/2012      CRF (chronic renal failure), unspecified stage      Dialysis patient      Anemia secondary to renal failure      HTN (hypertension)      Osteomyelitis      ESRD on dialysis      Falls      Ataxia      coronary stent 2007      s/p Ovarian cyst removal      s/p surgical removal of benign Skin lesion epigastric area      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          MEDICATIONS  (STANDING):  aspirin enteric coated 81 milliGRAM(s) Oral daily  atorvastatin 40 milliGRAM(s) Oral at bedtime  calcium acetate 667 milliGRAM(s) Oral three times a day with meals  cloNIDine 0.1 milliGRAM(s) Oral two times a day  imipramine 50 milliGRAM(s) Oral daily  lactobacillus acidophilus 1 Tablet(s) Oral two times a day  metoprolol tartrate 100 milliGRAM(s) Oral every 12 hours  Nephro-elvie 1 Tablet(s) Oral daily  pantoprazole    Tablet 40 milliGRAM(s) Oral before breakfast  risperiDONE   Tablet 0.5 milliGRAM(s) Oral two times a day  senna 2 Tablet(s) Oral at bedtime  zinc sulfate 220 milliGRAM(s) Oral daily      Allergies    latex (Unknown)  No Known Drug Allergies    Intolerances        SOCIAL HISTORY:  Denies ETOh,Smoking,     FAMILY HISTORY:      REVIEW OF SYSTEMS:    CONSTITUTIONAL: No weakness, fevers or chills  RESPIRATORY: No cough, wheezing, hemoptysis; No shortness of breath  CARDIOVASCULAR: No chest pain or palpitations  GASTROINTESTINAL: No abdominal or epigastric pain. No nausea, vomiting,     No diarrhea or constipation. No melena   SKIN: dry                                                        CAPILLARY BLOOD GLUCOSE      POCT Blood Glucose.: 459 mg/dL (17 Jul 2022 11:48)      Vital Signs Last 24 Hrs  T(C): 36.3 (17 Jul 2022 13:07), Max: 37.1 (16 Jul 2022 21:20)  T(F): 97.4 (17 Jul 2022 13:07), Max: 98.7 (16 Jul 2022 21:20)  HR: 68 (17 Jul 2022 13:07) (68 - 87)  BP: 126/66 (17 Jul 2022 13:07) (126/66 - 155/77)  BP(mean): --  RR: 18 (17 Jul 2022 13:07) (18 - 18)  SpO2: 97% (17 Jul 2022 13:07) (97% - 99%)    Parameters below as of 17 Jul 2022 13:07  Patient On (Oxygen Delivery Method): room air                               PHYSICAL EXAM:    Constitutional: NAD  HEENT: conjunctive   clear   Neck:  No JVD  Respiratory: CTAB  Cardiovascular: S1 and S2  Gastrointestinal: BS+, soft, NT/ND  Extremities: right foot dressing

## 2022-07-17 NOTE — PROGRESS NOTE ADULT - SUBJECTIVE AND OBJECTIVE BOX
[facility]  INFECTIOUS DISEASES   [address] [place] [address]  Tel: [phone]     Fax: [phone]  ======================================================  MD Zita Alejandra Kaushal, MD Cho, Michelle, MD   ======================================================    MRN-[id number]  SHILO KOHLER     Follow up: R foot wound infection     No fever or any other symptoms. No overnight event. Resting in bed, alert and oriented.     PAST MEDICAL & SURGICAL HISTORY:  Diabetes mellitus II  HTN (hypertension)  h/o Anxiety attack  Depression  h/o Myocardial infarct 2007  CAD (coronary artery disease)  h/o Hepatitis A 1969  currently resolved, no symptoms  PAD (peripheral artery disease)  Murmur, cardiac  h/o Smoking  quitted 3/2012  CRF (chronic renal failure), unspecified stage  Dialysis patient  Anemia secondary to renal failure  HTN (hypertension)  coronary stent 2007  s/p Ovarian cyst removal  s/p surgical removal of benign Skin lesion epigastric area    Social Hx: no current smoking, ETOH or drugs     FAMILY HISTORY:  No pertinent family history in first degree relatives    Allergies  latex (Unknown)  No Known Drug Allergies    Antibiotics:  MEDICATIONS  (STANDING):  aspirin enteric coated 81 milliGRAM(s) Oral daily  atorvastatin 40 milliGRAM(s) Oral at bedtime  calcium acetate 667 milliGRAM(s) Oral three times a day with meals  cloNIDine 0.1 milliGRAM(s) Oral two times a day  heparin   Injectable 5000 Unit(s) SubCutaneous every 8 hours  imipramine 50 milliGRAM(s) Oral daily  lactobacillus acidophilus 1 Tablet(s) Oral two times a day  metoprolol tartrate 100 milliGRAM(s) Oral every 12 hours  Nephro-elvie 1 Tablet(s) Oral daily  pantoprazole    Tablet 40 milliGRAM(s) Oral before breakfast  piperacillin/tazobactam IVPB.. 3.375 Gram(s) IV Intermittent every 12 hours  risperiDONE   Tablet 0.5 milliGRAM(s) Oral two times a day  senna 2 Tablet(s) Oral at bedtime  zinc sulfate 220 milliGRAM(s) Oral daily    MEDICATIONS  (PRN):  acetaminophen     Tablet .. 650 milliGRAM(s) Oral every 6 hours PRN Temp greater or equal to 38C (100.4F), Mild Pain (1 - 3)  aluminum hydroxide/magnesium hydroxide/simethicone Suspension 30 milliLiter(s) Oral every 4 hours PRN Dyspepsia  bisacodyl Suppository 10 milliGRAM(s) Rectal daily PRN Constipation  melatonin 3 milliGRAM(s) Oral at bedtime PRN Insomnia  ondansetron Injectable 4 milliGRAM(s) IV Push every 8 hours PRN Nausea and/or Vomiting  traMADol 50 milliGRAM(s) Oral three times a day PRN Moderate Pain (4 - 6)       REVIEW OF SYSTEMS:  CONSTITUTIONAL:  No Fever or chills  HEENT:  No diplopia or blurred vision.  No sore throat or runny nose.  CARDIOVASCULAR:  No chest pain or SOB.  RESPIRATORY:  No cough, shortness of breath, PND or orthopnea.  GASTROINTESTINAL:  No nausea, vomiting or diarrhea.  GENITOURINARY:  No dysuria, frequency or urgency. No Blood in urine  MUSCULOSKELETAL:  no joint aches, no muscle pain  SKIN:  No change in skin, hair or nails.  NEUROLOGIC:  No paresthesias, fasciculations, seizures or weakness.  PSYCHIATRIC:  No disorder of thought or mood.  ENDOCRINE:  No heat or cold intolerance, polyuria or polydipsia.  HEMATOLOGICAL:  No easy bruising or bleeding.     Physical Exam:  Vital Signs Last 24 Hrs  T(C): 36.3 (17 Jul 2022 13:07), Max: 37.1 (16 Jul 2022 21:20)  T(F): 97.4 (17 Jul 2022 13:07), Max: 98.7 (16 Jul 2022 21:20)  HR: 68 (17 Jul 2022 13:07) (68 - 87)  BP: 126/66 (17 Jul 2022 13:07) (126/66 - 155/77)  BP(mean): --  RR: 18 (17 Jul 2022 13:07) (18 - 18)  SpO2: 97% (17 Jul 2022 13:07) (97% - 99%)  Parameters below as of 17 Jul 2022 13:07  Patient On (Oxygen Delivery Method): room air  GEN: NAD  HEENT: normocephalic and atraumatic. EOMI. PERRL.    NECK: Supple.  No lymphadenopathy   LUNGS: Clear to auscultation.  HEART: Regular rate and rhythm  ABDOMEN: Soft, nontender, and nondistended.  Positive bowel sounds.    : No CVA tenderness  EXTREMITIES: bypass wounds are healed, R 1st toe stump open with some purulent discharge, mild cellulitis   NEUROLOGIC: grossly intact.  PSYCHIATRIC: Appropriate affect .  SKIN: No rash     Labs:    Culture - Abscess with Gram Stain (collected 07-15-22 @ 07:15)  Source: .Abscess R foot    Culture - Other (collected 07-14-22 @ 20:05)  Source: .Other Other    Culture - Blood (collected 07-14-22 @ 15:15)  Source: .Blood Blood-Peripheral    Culture - Blood (collected 07-14-22 @ 15:10)  Source: .Blood Blood-Peripheral    WBC Count: 8.51 K/uL (07-15-22 @ 06:12)  WBC Count: 9.08 K/uL (07-14-22 @ 15:15)    Creatinine, Serum: 5.80 mg/dL (07-15-22 @ 06:12)  Creatinine, Serum: 5.20 mg/dL (07-14-22 @ 15:15)    C-Reactive Protein, Serum: 14 mg/L (07-14-22 @ 15:15)    Sedimentation Rate, Erythrocyte: 39 mm/hr (07-14-22 @ 15:15)    COVID-19 PCR: NotDetec (07-14-22 @ 15:15)  COVID-19 PCR: NotDetec (07-06-22 @ 07:15)  COVID-19 PCR: NotDetec (07-02-22 @ 11:50)  COVID-19 PCR: NotDetec (06-28-22 @ 14:00)  COVID-19 PCR: NotDetec (06-23-22 @ 11:42)  COVID-19 PCR: NotDetec (06-20-22 @ 08:14)    All imaging and other data have been reviewed.  < from: Xray Foot AP + Lateral + Oblique, Right (07.14.22 @ 17:02) >  IMPRESSION: Soft tissue ulcer at the amputation site around the right   first metatarsal. Clear lungs at this time.    Assessment and Plan:   72 yo woman with PMH of HTN, DM2, ESRD on HD, Afib, CHF, CAD s/p CABG, PAD S/P R-->L bifem-fem/Pop bypass, recent R fem/pop bypass on 6/21/2022 and partial ray amputation right great toe 6/23/2022   was admitted from [facility] for wound dehiscence and infection.   ESR 39 and CRP 14  MRSA PCR negative   6/21 s/p Femoral popliteal bypass with prosthetic graft  6/23 s/p R 1st toe ray amputation, Wound culture from OR with MSSA and enterococcus fecalis   Pathology showed OM in margines so need 6 weeks treatment from the start   Was on Unasyn to complete 6 weeks on 7/26.   No leukocytosis or fever on admission     Recommendations:  - Blood cultures NGTD  - Wound culture with Klebsiella and enterobacter, will follow sensitivities   - Vascular surgery and podiatry consults noted   - Based on old cultures will continue zosyn until sensitivity of the new wound culture is back     Will follow.    Geovany Long MD  Division of Infectious Diseases   Please call ID service at [phone] with any question.      35 minutes spent on total encounter assessing patient, examination, chart reivew, counseling and coordinating care by the attending physician/nurse/care manager.

## 2022-07-17 NOTE — PROGRESS NOTE ADULT - SUBJECTIVE AND OBJECTIVE BOX
PROGRESS NOTE  Patient is a 71y old  Female who presents with a chief complaint of R foot wound infection (17 Jul 2022 09:11)    Chart and available morning labs /imaging are reviewed electronically , urgent issues addressed . More information  is being added upon completion of rounds , when more information is collected and management discussed with consultants , medical staff and social service/case management on the floor   OVERNIGHT  No new issues reported by medical staff . All above noted Patient is resting in a bed comfortably .Confused ,poor mentation .No distress noted   Refused labs this am   HPI:  71 year old female with PMH of Afib (not on AC for hx of multiple falls), T2DM, HTN, HLD, ESRD on HD (MWF), CHF, CAD s/p CABG, PAD S/P R-->L bifem-fem BK pop bypass, recent fempop bypass (6/21/2022), and partial ray amputation right great toe (6/22/2022) presented today from [facility] for wound checkup.  Patient states she noticed something wrong with her surgery site a week after since she has started putting weight on it.  She was recently discharged from  in 7/7 to [facility].  c/o pain 2 weeks ago especially if she puts pressure on her right foot.  Denies fever or chills .Admitted for podiatry evaluation and septic workup ,ID cons Palliative care consult requested ,to discuss advance directives and complete [facility] Pathology Final Diagnosis  06/23/22 1. Right hallux -Acute and chronic osteomyelitis. -Gangrenous skin and soft tissue. 2. Right first metatarsal/clean margin: -Minimal chronic osteomyelitis. Patient was on iv vancomycin with dialysis at [facility] . (14 Jul 2022 19:42)    PAST MEDICAL & SURGICAL HISTORY:  Diabetes mellitus II      HTN (hypertension)      PAD (peripheral artery disease)      h/o Anxiety attack      Depression      h/o Myocardial infarct 2007      CAD (coronary artery disease)      h/o Hepatitis A 1969  currently resolved, no symptoms      Murmur, cardiac      h/o Smoking  quitted 3/2012      CRF (chronic renal failure), unspecified stage      Dialysis patient      Anemia secondary to renal failure      HTN (hypertension)      Osteomyelitis      ESRD on dialysis      Falls      Ataxia      coronary stent 2007      s/p Ovarian cyst removal      s/p surgical removal of benign Skin lesion epigastric area      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          MEDICATIONS  (STANDING):  aspirin enteric coated 81 milliGRAM(s) Oral daily  atorvastatin 40 milliGRAM(s) Oral at bedtime  calcium acetate 667 milliGRAM(s) Oral three times a day with meals  cloNIDine 0.1 milliGRAM(s) Oral two times a day  dextrose 5%. 1000 milliLiter(s) (50 mL/Hr) IV Continuous <Continuous>  dextrose 5%. 1000 milliLiter(s) (100 mL/Hr) IV Continuous <Continuous>  dextrose 50% Injectable 25 Gram(s) IV Push once  dextrose 50% Injectable 12.5 Gram(s) IV Push once  dextrose 50% Injectable 25 Gram(s) IV Push once  epoetin fawad-epbx (RETACRIT) Injectable 79684 Unit(s) IV Push <User Schedule>  glucagon  Injectable 1 milliGRAM(s) IntraMuscular once  heparin   Injectable 5000 Unit(s) SubCutaneous every 8 hours  hydrogen peroxide 3% Solution 1 Application(s) Topical once  imipramine 50 milliGRAM(s) Oral daily  insulin lispro (ADMELOG) corrective regimen sliding scale   SubCutaneous three times a day before meals  insulin lispro (ADMELOG) corrective regimen sliding scale   SubCutaneous at bedtime  lactobacillus acidophilus 1 Tablet(s) Oral two times a day  metoprolol tartrate 100 milliGRAM(s) Oral every 12 hours  Nephro-elvie 1 Tablet(s) Oral daily  pantoprazole    Tablet 40 milliGRAM(s) Oral before breakfast  piperacillin/tazobactam IVPB.. 3.375 Gram(s) IV Intermittent every 12 hours  risperiDONE   Tablet 0.5 milliGRAM(s) Oral two times a day  senna 2 Tablet(s) Oral at bedtime  zinc sulfate 220 milliGRAM(s) Oral daily    MEDICATIONS  (PRN):  acetaminophen     Tablet .. 650 milliGRAM(s) Oral every 6 hours PRN Temp greater or equal to 38C (100.4F), Mild Pain (1 - 3)  aluminum hydroxide/magnesium hydroxide/simethicone Suspension 30 milliLiter(s) Oral every 4 hours PRN Dyspepsia  bisacodyl Suppository 10 milliGRAM(s) Rectal daily PRN Constipation  dextrose Oral Gel 15 Gram(s) Oral once PRN Blood Glucose LESS THAN 70 milliGRAM(s)/deciliter  melatonin 3 milliGRAM(s) Oral at bedtime PRN Insomnia  ondansetron Injectable 4 milliGRAM(s) IV Push every 8 hours PRN Nausea and/or Vomiting  traMADol 50 milliGRAM(s) Oral three times a day PRN Moderate Pain (4 - 6)      OBJECTIVE    T(C): 36.4 (07-17-22 @ 05:13), Max: 37.1 (07-16-22 @ 21:20)  HR: 70 (07-17-22 @ 05:13) (70 - 87)  BP: 131/68 (07-17-22 @ 05:13) (107/67 - 155/77)  RR: 18 (07-17-22 @ 05:13) (18 - 18)  SpO2: 97% (07-17-22 @ 05:13) (97% - 99%)  Wt(kg): --  I&O's Summary    16 Jul 2022 07:01  -  17 Jul 2022 07:00  --------------------------------------------------------  IN: 480 mL / OUT: 0 mL / NET: 480 mL          REVIEW OF SYSTEMS:  CONSTITUTIONAL: No fever, weight loss, or fatigue  EYES: No eye pain, visual disturbances, or discharge  ENMT:   No sinus or throat pain  NECK: No pain or stiffness  RESPIRATORY: No cough, wheezing, chills or hemoptysis; No shortness of breath  CARDIOVASCULAR: No chest pain, palpitations, dizziness, or leg swelling  GASTROINTESTINAL: No abdominal pain. No nausea, vomiting; No diarrhea or constipation. No melena or hematochezia.  GENITOURINARY: No dysuria, frequency, hematuria, or incontinence  NEUROLOGICAL: No headaches, memory loss, loss of strength, numbness, or tremors  SKIN: No itching, burning, rashes, or lesions   MUSCULOSKELETAL: No joint pain or swelling; No muscle, back, or extremity pain    PHYSICAL EXAM:  Appearance: NAD. VS past 24 hrs -as above   HEENT:   Moist oral mucosa. Conjunctiva clear b/l.   Neck : supple  Respiratory: Lungs CTAB.  Gastrointestinal:  Soft, nontender. No rebound. No rigidity. BS present	  Cardiovascular: RRR ,S1S2 present  Neurologic: Non-focal. Moving all extremities.  Extremities: No edema. No erythema. No calf tenderness.  Skin: No rashes, No ecchymoses, No cyanosis.	  wounds ,skin lesions-See skin assesment flow sheet   LABS:          CAPILLARY BLOOD GLUCOSE              Culture - Abscess with Gram Stain (collected 15 Jul 2022 07:15)  Source: .Abscess R foot  Preliminary Report (16 Jul 2022 12:29):    Numerous Enterobacter cloacae    Numerous Klebsiella pneumoniae    Culture - Blood (collected 14 Jul 2022 15:15)  Source: .Blood Blood-Peripheral  Preliminary Report (16 Jul 2022 01:02):    No growth to date.    Culture - Blood (collected 14 Jul 2022 15:10)  Source: .Blood Blood-Peripheral  Preliminary Report (16 Jul 2022 01:02):    No growth to date.      RADIOLOGY & ADDITIONAL TESTS:   reviewed elctronically  ASSESSMENT/PLAN: 	    25 minutes aggregate time was spent on this visit, 50% visit time spent in care co-ordination with other attendings and counselling patient .I have discussed care plan with patient / HCP/family member ,who expressed understanding of problems treatment and their effect and side effects, alternatives in details. I have asked if they have any questions and concerns and appropriately addressed them to best of my ability.

## 2022-07-17 NOTE — PROGRESS NOTE ADULT - SUBJECTIVE AND OBJECTIVE BOX
Patient is a 71y Female with a known history of :  Cellulitis [L03.90]    Acute on chronic osteomyelitis [M86.10]    DM (diabetes mellitus) [E11.9]    HTN (hypertension) [I10]    ESRD on hemodialysis [N18.6]    Prophylactic measure [Z29.9]    Dehiscence of wound [T81.30XA]      HPI:  71 year old female with PMH of Afib (not on AC for hx of multiple falls), T2DM, HTN, HLD, ESRD on HD (MWF), CHF, CAD s/p CABG, PAD S/P R-->L bifem-fem BK pop bypass, recent fempop bypass (6/21/2022), and partial ray amputation right great toe (6/22/2022) presented today from [facility] for wound checkup.  Patient states she noticed something wrong with her surgery site a week after since she has started putting weight on it.  She was recently discharged from  in 7/7 to [facility].  c/o pain 2 weeks ago especially if she puts pressure on her right foot.  Denies fever or chills .Admitted for podiatry evaluation and septic workup ,[facility] Palliative care consult requested ,to discuss advance directives and complete [facility] Pathology Final Diagnosis  06/23/22 1. Right hallux -Acute and chronic osteomyelitis. -Gangrenous skin and soft tissue. 2. Right first metatarsal/clean margin: -Minimal chronic osteomyelitis. Patient was on iv vancomycin with dialysis at [facility] . (14 Jul 2022 19:42)      REVIEW OF SYSTEMS:    CONSTITUTIONAL: No fever, weight loss, or fatigue  EYES: No eye pain, visual disturbances, or discharge  ENMT:  No difficulty hearing, tinnitus, vertigo; No sinus or throat pain  NECK: No pain or stiffness  BREASTS: No pain, masses, or nipple discharge  RESPIRATORY: No cough, wheezing, chills or hemoptysis; No shortness of breath  CARDIOVASCULAR: No chest pain, palpitations, dizziness, or leg swelling  GASTROINTESTINAL: No abdominal or epigastric pain. No nausea, vomiting, or hematemesis; No diarrhea or constipation. No melena or hematochezia.  GENITOURINARY: No dysuria, frequency, hematuria, or incontinence  NEUROLOGICAL: No headaches, memory loss, loss of strength, numbness, or tremors  SKIN: No itching, burning, rashes, or lesions   LYMPH NODES: No enlarged glands  ENDOCRINE: No heat or cold intolerance; No hair loss  MUSCULOSKELETAL: No joint pain or swelling; No muscle, back, or extremity pain  PSYCHIATRIC: No depression, anxiety, mood swings, or difficulty sleeping  HEME/LYMPH: No easy bruising, or bleeding gums  ALLERGY AND IMMUNOLOGIC: No hives or eczema    MEDICATIONS  (STANDING):  aspirin enteric coated 81 milliGRAM(s) Oral daily  atorvastatin 40 milliGRAM(s) Oral at bedtime  calcium acetate 667 milliGRAM(s) Oral three times a day with meals  cloNIDine 0.1 milliGRAM(s) Oral two times a day  dextrose 5%. 1000 milliLiter(s) (50 mL/Hr) IV Continuous <Continuous>  dextrose 5%. 1000 milliLiter(s) (100 mL/Hr) IV Continuous <Continuous>  dextrose 50% Injectable 25 Gram(s) IV Push once  dextrose 50% Injectable 12.5 Gram(s) IV Push once  dextrose 50% Injectable 25 Gram(s) IV Push once  epoetin fawad-epbx (RETACRIT) Injectable 63514 Unit(s) IV Push <User Schedule>  glucagon  Injectable 1 milliGRAM(s) IntraMuscular once  heparin   Injectable 5000 Unit(s) SubCutaneous every 8 hours  hydrogen peroxide 3% Solution 1 Application(s) Topical once  imipramine 50 milliGRAM(s) Oral daily  insulin lispro (ADMELOG) corrective regimen sliding scale   SubCutaneous three times a day before meals  insulin lispro (ADMELOG) corrective regimen sliding scale   SubCutaneous at bedtime  lactobacillus acidophilus 1 Tablet(s) Oral two times a day  metoprolol tartrate 100 milliGRAM(s) Oral every 12 hours  Nephro-elvie 1 Tablet(s) Oral daily  pantoprazole    Tablet 40 milliGRAM(s) Oral before breakfast  piperacillin/tazobactam IVPB.. 3.375 Gram(s) IV Intermittent every 12 hours  risperiDONE   Tablet 0.5 milliGRAM(s) Oral two times a day  senna 2 Tablet(s) Oral at bedtime  zinc sulfate 220 milliGRAM(s) Oral daily    MEDICATIONS  (PRN):  acetaminophen     Tablet .. 650 milliGRAM(s) Oral every 6 hours PRN Temp greater or equal to 38C (100.4F), Mild Pain (1 - 3)  aluminum hydroxide/magnesium hydroxide/simethicone Suspension 30 milliLiter(s) Oral every 4 hours PRN Dyspepsia  bisacodyl Suppository 10 milliGRAM(s) Rectal daily PRN Constipation  dextrose Oral Gel 15 Gram(s) Oral once PRN Blood Glucose LESS THAN 70 milliGRAM(s)/deciliter  melatonin 3 milliGRAM(s) Oral at bedtime PRN Insomnia  ondansetron Injectable 4 milliGRAM(s) IV Push every 8 hours PRN Nausea and/or Vomiting  traMADol 50 milliGRAM(s) Oral three times a day PRN Moderate Pain (4 - 6)      ALLERGIES: latex (Unknown)  No Known Drug Allergies      FAMILY HISTORY:      PHYSICAL EXAMINATION:  -----------------------------  T(C): 36.4 (07-17-22 @ 05:13), Max: 37.1 (07-16-22 @ 21:20)  HR: 70 (07-17-22 @ 05:13) (70 - 87)  BP: 131/68 (07-17-22 @ 05:13) (107/67 - 155/77)  RR: 18 (07-17-22 @ 05:13) (18 - 18)  SpO2: 97% (07-17-22 @ 05:13) (97% - 99%)  Wt(kg): --    07-16 @ 07:01  -  07-17 @ 07:00  --------------------------------------------------------  IN:    Oral Fluid: 480 mL  Total IN: 480 mL    OUT:  Total OUT: 0 mL    Total NET: 480 mL            VITALS  T(C): 36.4 (07-17-22 @ 05:13), Max: 37.1 (07-16-22 @ 21:20)  HR: 70 (07-17-22 @ 05:13) (70 - 87)  BP: 131/68 (07-17-22 @ 05:13) (107/67 - 155/77)  RR: 18 (07-17-22 @ 05:13) (18 - 18)  SpO2: 97% (07-17-22 @ 05:13) (97% - 99%)    Constitutional: well developed, normal appearance, well groomed, well nourished, no deformities and no acute distress.   Eyes: the conjunctiva exhibited no abnormalities and the eyelids demonstrated no xanthelasmas.   HEENT: normal oral mucosa, no oral pallor and no oral cyanosis.   Neck: normal jugular venous A waves present, normal jugular venous V waves present and no jugular venous wilson A waves.   Pulmonary: no respiratory distress, normal respiratory rhythm and effort, no accessory muscle use and lungs were clear to auscultation bilaterally.   Cardiovascular: heart rate and rhythm were normal, normal S1 and S2 and no murmur, gallop, rub, heave or thrill are present.   Abdomen: soft, non-tender, no hepato-splenomegaly and no abdominal mass palpated.   Musculoskeletal: the gait could not be assessed..   Extremities: no clubbing of the fingernails, no localized cyanosis, no petechial hemorrhages and no ischemic changes.   Skin: normal skin color and pigmentation, no rash, no venous stasis, no skin lesions, no skin ulcer and no xanthoma was observed.   Psychiatric: oriented to person, place, and time, the affect was normal, the mood was normal and not feeling anxious.     LABS:   --------                     RADIOLOGY:  -----------------    ECG:     ECHO:

## 2022-07-18 DIAGNOSIS — E11.9 TYPE 2 DIABETES MELLITUS WITHOUT COMPLICATIONS: ICD-10-CM

## 2022-07-18 LAB
-  AMIKACIN: SIGNIFICANT CHANGE UP
-  AMIKACIN: SIGNIFICANT CHANGE UP
-  AMOXICILLIN/CLAVULANIC ACID: SIGNIFICANT CHANGE UP
-  AMOXICILLIN/CLAVULANIC ACID: SIGNIFICANT CHANGE UP
-  AMPICILLIN/SULBACTAM: SIGNIFICANT CHANGE UP
-  AMPICILLIN/SULBACTAM: SIGNIFICANT CHANGE UP
-  AMPICILLIN: SIGNIFICANT CHANGE UP
-  AMPICILLIN: SIGNIFICANT CHANGE UP
-  AZTREONAM: SIGNIFICANT CHANGE UP
-  AZTREONAM: SIGNIFICANT CHANGE UP
-  CEFAZOLIN: SIGNIFICANT CHANGE UP
-  CEFAZOLIN: SIGNIFICANT CHANGE UP
-  CEFEPIME: SIGNIFICANT CHANGE UP
-  CEFEPIME: SIGNIFICANT CHANGE UP
-  CEFOXITIN: SIGNIFICANT CHANGE UP
-  CEFOXITIN: SIGNIFICANT CHANGE UP
-  CEFTAZIDIME: SIGNIFICANT CHANGE UP
-  CEFTRIAXONE: SIGNIFICANT CHANGE UP
-  CEFTRIAXONE: SIGNIFICANT CHANGE UP
-  CIPROFLOXACIN: SIGNIFICANT CHANGE UP
-  CIPROFLOXACIN: SIGNIFICANT CHANGE UP
-  ERTAPENEM: SIGNIFICANT CHANGE UP
-  ERTAPENEM: SIGNIFICANT CHANGE UP
-  GENTAMICIN: SIGNIFICANT CHANGE UP
-  GENTAMICIN: SIGNIFICANT CHANGE UP
-  IMIPENEM: SIGNIFICANT CHANGE UP
-  IMIPENEM: SIGNIFICANT CHANGE UP
-  LEVOFLOXACIN: SIGNIFICANT CHANGE UP
-  MEROPENEM: SIGNIFICANT CHANGE UP
-  MEROPENEM: SIGNIFICANT CHANGE UP
-  PIPERACILLIN/TAZOBACTAM: SIGNIFICANT CHANGE UP
-  PIPERACILLIN/TAZOBACTAM: SIGNIFICANT CHANGE UP
-  TOBRAMYCIN: SIGNIFICANT CHANGE UP
-  TOBRAMYCIN: SIGNIFICANT CHANGE UP
-  TRIMETHOPRIM/SULFAMETHOXAZOLE: SIGNIFICANT CHANGE UP
ANION GAP SERPL CALC-SCNC: 9 MMOL/L — SIGNIFICANT CHANGE UP (ref 5–17)
BUN SERPL-MCNC: 69 MG/DL — HIGH (ref 7–23)
CALCIUM SERPL-MCNC: 9.5 MG/DL — SIGNIFICANT CHANGE UP (ref 8.5–10.1)
CHLORIDE SERPL-SCNC: 95 MMOL/L — LOW (ref 96–108)
CO2 SERPL-SCNC: 29 MMOL/L — SIGNIFICANT CHANGE UP (ref 22–31)
CREAT SERPL-MCNC: 5.8 MG/DL — HIGH (ref 0.5–1.3)
EGFR: 7 ML/MIN/1.73M2 — LOW
GLUCOSE SERPL-MCNC: 389 MG/DL — HIGH (ref 70–99)
HCT VFR BLD CALC: 26.6 % — LOW (ref 34.5–45)
HGB BLD-MCNC: 8.3 G/DL — LOW (ref 11.5–15.5)
MCHC RBC-ENTMCNC: 29.2 PG — SIGNIFICANT CHANGE UP (ref 27–34)
MCHC RBC-ENTMCNC: 31.2 GM/DL — LOW (ref 32–36)
MCV RBC AUTO: 93.7 FL — SIGNIFICANT CHANGE UP (ref 80–100)
METHOD TYPE: SIGNIFICANT CHANGE UP
NRBC # BLD: 0 /100 WBCS — SIGNIFICANT CHANGE UP (ref 0–0)
PLATELET # BLD AUTO: 365 K/UL — SIGNIFICANT CHANGE UP (ref 150–400)
POTASSIUM SERPL-MCNC: 4.2 MMOL/L — SIGNIFICANT CHANGE UP (ref 3.5–5.3)
POTASSIUM SERPL-SCNC: 4.2 MMOL/L — SIGNIFICANT CHANGE UP (ref 3.5–5.3)
RBC # BLD: 2.84 M/UL — LOW (ref 3.8–5.2)
RBC # FLD: 18.1 % — HIGH (ref 10.3–14.5)
SARS-COV-2 RNA SPEC QL NAA+PROBE: SIGNIFICANT CHANGE UP
SODIUM SERPL-SCNC: 133 MMOL/L — LOW (ref 135–145)
WBC # BLD: 11.76 K/UL — HIGH (ref 3.8–10.5)
WBC # FLD AUTO: 11.76 K/UL — HIGH (ref 3.8–10.5)

## 2022-07-18 PROCEDURE — 99232 SBSQ HOSP IP/OBS MODERATE 35: CPT

## 2022-07-18 RX ORDER — INSULIN LISPRO 100/ML
2 VIAL (ML) SUBCUTANEOUS
Refills: 0 | Status: DISCONTINUED | OUTPATIENT
Start: 2022-07-18 | End: 2022-07-19

## 2022-07-18 RX ORDER — CIPROFLOXACIN LACTATE 400MG/40ML
500 VIAL (ML) INTRAVENOUS DAILY
Refills: 0 | Status: DISCONTINUED | OUTPATIENT
Start: 2022-07-18 | End: 2022-07-19

## 2022-07-18 RX ORDER — INSULIN GLARGINE 100 [IU]/ML
10 INJECTION, SOLUTION SUBCUTANEOUS AT BEDTIME
Refills: 0 | Status: DISCONTINUED | OUTPATIENT
Start: 2022-07-18 | End: 2022-07-19

## 2022-07-18 RX ADMIN — Medication 5: at 07:51

## 2022-07-18 RX ADMIN — PIPERACILLIN AND TAZOBACTAM 25 GRAM(S): 4; .5 INJECTION, POWDER, LYOPHILIZED, FOR SOLUTION INTRAVENOUS at 05:22

## 2022-07-18 RX ADMIN — Medication 667 MILLIGRAM(S): at 12:04

## 2022-07-18 RX ADMIN — HEPARIN SODIUM 5000 UNIT(S): 5000 INJECTION INTRAVENOUS; SUBCUTANEOUS at 22:10

## 2022-07-18 RX ADMIN — Medication 50 MILLIGRAM(S): at 12:03

## 2022-07-18 RX ADMIN — Medication 100 MILLIGRAM(S): at 05:21

## 2022-07-18 RX ADMIN — Medication 2 UNIT(S): at 17:46

## 2022-07-18 RX ADMIN — Medication 1 TABLET(S): at 05:21

## 2022-07-18 RX ADMIN — Medication 0.1 MILLIGRAM(S): at 17:44

## 2022-07-18 RX ADMIN — PANTOPRAZOLE SODIUM 40 MILLIGRAM(S): 20 TABLET, DELAYED RELEASE ORAL at 05:22

## 2022-07-18 RX ADMIN — RISPERIDONE 0.5 MILLIGRAM(S): 4 TABLET ORAL at 17:45

## 2022-07-18 RX ADMIN — Medication 1: at 17:46

## 2022-07-18 RX ADMIN — Medication 81 MILLIGRAM(S): at 12:03

## 2022-07-18 RX ADMIN — Medication 3 MILLIGRAM(S): at 22:11

## 2022-07-18 RX ADMIN — Medication 0.1 MILLIGRAM(S): at 05:22

## 2022-07-18 RX ADMIN — ERYTHROPOIETIN 10000 UNIT(S): 10000 INJECTION, SOLUTION INTRAVENOUS; SUBCUTANEOUS at 15:47

## 2022-07-18 RX ADMIN — ATORVASTATIN CALCIUM 40 MILLIGRAM(S): 80 TABLET, FILM COATED ORAL at 22:11

## 2022-07-18 RX ADMIN — Medication 667 MILLIGRAM(S): at 17:44

## 2022-07-18 RX ADMIN — Medication 5: at 12:02

## 2022-07-18 RX ADMIN — Medication 650 MILLIGRAM(S): at 13:30

## 2022-07-18 RX ADMIN — RISPERIDONE 0.5 MILLIGRAM(S): 4 TABLET ORAL at 05:22

## 2022-07-18 RX ADMIN — Medication 1: at 22:11

## 2022-07-18 RX ADMIN — Medication 100 MILLIGRAM(S): at 17:45

## 2022-07-18 RX ADMIN — Medication 500 MILLIGRAM(S): at 17:44

## 2022-07-18 RX ADMIN — ZINC SULFATE TAB 220 MG (50 MG ZINC EQUIVALENT) 220 MILLIGRAM(S): 220 (50 ZN) TAB at 12:03

## 2022-07-18 RX ADMIN — Medication 650 MILLIGRAM(S): at 12:30

## 2022-07-18 RX ADMIN — HEPARIN SODIUM 5000 UNIT(S): 5000 INJECTION INTRAVENOUS; SUBCUTANEOUS at 05:21

## 2022-07-18 RX ADMIN — Medication 667 MILLIGRAM(S): at 07:51

## 2022-07-18 RX ADMIN — SENNA PLUS 2 TABLET(S): 8.6 TABLET ORAL at 22:11

## 2022-07-18 RX ADMIN — Medication 1 TABLET(S): at 12:03

## 2022-07-18 RX ADMIN — Medication 1 TABLET(S): at 17:45

## 2022-07-18 RX ADMIN — INSULIN GLARGINE 10 UNIT(S): 100 INJECTION, SOLUTION SUBCUTANEOUS at 22:16

## 2022-07-18 NOTE — SWALLOW BEDSIDE ASSESSMENT ADULT - SWALLOW EVAL: DIAGNOSIS
Pt self-administered PO trials of thin liquids, mildly thick liquids, puree, and regular solids. Pt p/w a functional oral phase marked by adequate retrieval and containment, timely manipulation and transfer, and adequate clearance post swallow. Mastication of solids was mildly prolonged 2/2 absent bottom dentition. Pharyngeal phase marked by suspected timely swallow onset, +laryngeal elevation to palpation, and no overt s/sx of aspiration. Recommend soft and bite sized with thin liquids +aspiration precautions. Discussed with Dr. Larson.

## 2022-07-18 NOTE — PROGRESS NOTE ADULT - SUBJECTIVE AND OBJECTIVE BOX
[facility]  INFECTIOUS DISEASES   [address] [place] [address]  Tel: [phone]     Fax: [phone]  ======================================================  MD Zita Alejandra Kaushal, MD Cho, Michelle, MD   ======================================================    MRN-[id number]  SHILO KOHLER     Follow up: R foot wound infection     No fever or any other symptoms. No overnight event.  No pain in foot, alert and oriented.     PAST MEDICAL & SURGICAL HISTORY:  Diabetes mellitus II  HTN (hypertension)  h/o Anxiety attack  Depression  h/o Myocardial infarct 2007  CAD (coronary artery disease)  h/o Hepatitis A 1969  currently resolved, no symptoms  PAD (peripheral artery disease)  Murmur, cardiac  h/o Smoking  quitted 3/2012  CRF (chronic renal failure), unspecified stage  Dialysis patient  Anemia secondary to renal failure  HTN (hypertension)  coronary stent 2007  s/p Ovarian cyst removal  s/p surgical removal of benign Skin lesion epigastric area    Social Hx: no current smoking, ETOH or drugs     FAMILY HISTORY:  No pertinent family history in first degree relatives    Allergies  latex (Unknown)  No Known Drug Allergies    Antibiotics:  MEDICATIONS  (STANDING):  aspirin enteric coated 81 milliGRAM(s) Oral daily  atorvastatin 40 milliGRAM(s) Oral at bedtime  calcium acetate 667 milliGRAM(s) Oral three times a day with meals  cloNIDine 0.1 milliGRAM(s) Oral two times a day  heparin   Injectable 5000 Unit(s) SubCutaneous every 8 hours  imipramine 50 milliGRAM(s) Oral daily  lactobacillus acidophilus 1 Tablet(s) Oral two times a day  metoprolol tartrate 100 milliGRAM(s) Oral every 12 hours  Nephro-elvie 1 Tablet(s) Oral daily  pantoprazole    Tablet 40 milliGRAM(s) Oral before breakfast  piperacillin/tazobactam IVPB.. 3.375 Gram(s) IV Intermittent every 12 hours  risperiDONE   Tablet 0.5 milliGRAM(s) Oral two times a day  senna 2 Tablet(s) Oral at bedtime  zinc sulfate 220 milliGRAM(s) Oral daily     REVIEW OF SYSTEMS:  CONSTITUTIONAL:  No Fever or chills  HEENT:  No diplopia or blurred vision.  No sore throat or runny nose.  CARDIOVASCULAR:  No chest pain or SOB.  RESPIRATORY:  No cough, shortness of breath, PND or orthopnea.  GASTROINTESTINAL:  No nausea, vomiting or diarrhea.  GENITOURINARY:  No dysuria, frequency or urgency. No Blood in urine  MUSCULOSKELETAL:  no joint aches, no muscle pain  SKIN:  No change in skin, hair or nails.  NEUROLOGIC:  No paresthesias, fasciculations, seizures or weakness.  PSYCHIATRIC:  No disorder of thought or mood.  ENDOCRINE:  No heat or cold intolerance, polyuria or polydipsia.  HEMATOLOGICAL:  No easy bruising or bleeding.     Physical Exam:  Vital Signs Last 24 Hrs  T(C): 37.1 (18 Jul 2022 05:16), Max: 37.1 (18 Jul 2022 05:16)  T(F): 98.7 (18 Jul 2022 05:16), Max: 98.7 (18 Jul 2022 05:16)  HR: 78 (18 Jul 2022 05:16) (68 - 83)  BP: 136/71 (18 Jul 2022 05:16) (126/66 - 147/69)  BP(mean): --  RR: 18 (18 Jul 2022 05:16) (18 - 18)  SpO2: 98% (18 Jul 2022 05:16) (96% - 98%)  Parameters below as of 18 Jul 2022 05:16  Patient On (Oxygen Delivery Method): room air  GEN: NAD  HEENT: normocephalic and atraumatic. EOMI. PERRL.    NECK: Supple.  No lymphadenopathy   LUNGS: Clear to auscultation.  HEART: Regular rate and rhythm  ABDOMEN: Soft, nontender, and nondistended.  Positive bowel sounds.    : No CVA tenderness  EXTREMITIES: bypass wounds are healed, R 1st toe stump open with some purulent discharge, mild cellulitis   NEUROLOGIC: grossly intact.  PSYCHIATRIC: Appropriate affect .  SKIN: No rash     Labs:  Culture - Abscess with Gram Stain (collected 07-15-22 @ 07:15)  Source: .Abscess R foot  Organism: Klebsiella pneumoniae (07-18-22 @ 10:45)  Organism: Klebsiella pneumoniae (07-18-22 @ 10:45)    Sensitivities:      -  Amikacin: S <=16      -  Amoxicillin/Clavulanic Acid: S <=8/4      -  Ampicillin: R >16 These ampicillin results predict results for amoxicillin      -  Ampicillin/Sulbactam: S <=4/2 Enterobacter, Klebsiella aerogenes, Citrobacter, and Serratia may develop resistance during prolonged therapy (3-4 days)      -  Aztreonam: S <=4      -  Cefazolin: S <=2 Enterobacter, Klebsiella aerogenes, Citrobacter, and Serratia may develop resistance during prolonged therapy (3-4 days)      -  Cefepime: S <=2      -  Cefoxitin: S <=8      -  Ceftriaxone: S <=1 Enterobacter, Klebsiella aerogenes, Citrobacter, and Serratia may develop resistance during prolonged therapy      -  Ciprofloxacin: S <=0.25      -  Ertapenem: S <=0.5      -  Gentamicin: S <=2      -  Imipenem: S <=1      -  Levofloxacin: S <=0.5      -  Meropenem: S <=1      -  Piperacillin/Tazobactam: S <=8      -  Tobramycin: S <=2      -  Trimethoprim/Sulfamethoxazole: S <=0.5/9.5      Method Type: SONIA    Culture - Other (collected 07-14-22 @ 20:05)  Source: .Other Other  Organism: Enterobacter cloacae complex (07-18-22 @ 09:56)  Organism: Enterobacter cloacae complex (07-18-22 @ 09:56)    Sensitivities:      -  Amikacin: S <=16      -  Amoxicillin/Clavulanic Acid: R >16/8      -  Ampicillin: R >16 These ampicillin results predict results for amoxicillin      -  Ampicillin/Sulbactam: R >16/8 Enterobacter, Klebsiella aerogenes, Citrobacter, and Serratia may develop resistance during prolonged therapy (3-4 days)      -  Aztreonam: R >16      -  Cefazolin: R >16 Enterobacter, Klebsiella aerogenes, Citrobacter, and Serratia may develop resistance during prolonged therapy (3-4 days)      -  Cefepime: S 8      -  Cefoxitin: R >16      -  Ceftriaxone: R >32 Enterobacter, Klebsiella aerogenes, Citrobacter, and Serratia may develop resistance during prolonged therapy      -  Ciprofloxacin: S <=0.25      -  Ertapenem: S <=0.5      -  Gentamicin: S <=2      -  Imipenem: S <=1      -  Levofloxacin: S <=0.5      -  Meropenem: S <=1      -  Piperacillin/Tazobactam: R >64      -  Tobramycin: S <=2      -  Trimethoprim/Sulfamethoxazole: S <=0.5/9.5      Method Type: SONIA    Culture - Blood (collected 07-14-22 @ 15:15)  Source: .Blood Blood-Peripheral    Culture - Blood (collected 07-14-22 @ 15:10)  Source: .Blood Blood-Peripheral    WBC Count: 8.51 K/uL (07-15-22 @ 06:12)  WBC Count: 9.08 K/uL (07-14-22 @ 15:15)    Creatinine, Serum: 5.80 mg/dL (07-15-22 @ 06:12)  Creatinine, Serum: 5.20 mg/dL (07-14-22 @ 15:15)    C-Reactive Protein, Serum: 14 mg/L (07-14-22 @ 15:15)    Sedimentation Rate, Erythrocyte: 39 mm/hr (07-14-22 @ 15:15)     COVID-19 PCR: NotDetec (07-14-22 @ 15:15)  COVID-19 PCR: NotDetec (07-06-22 @ 07:15)  COVID-19 PCR: NotDetec (07-02-22 @ 11:50)  COVID-19 PCR: NotDetec (06-28-22 @ 14:00)  COVID-19 PCR: NotDetec (06-23-22 @ 11:42)  COVID-19 PCR: NotDetec (06-20-22 @ 08:14)    All imaging and other data have been reviewed.  < from: Xray Foot AP + Lateral + Oblique, Right (07.14.22 @ 17:02) >  IMPRESSION: Soft tissue ulcer at the amputation site around the right   first metatarsal. Clear lungs at this time.    Assessment and Plan:   72 yo woman with PMH of HTN, DM2, ESRD on HD, Afib, CHF, CAD s/p CABG, PAD S/P R-->L bifem-fem/Pop bypass, recent R fem/pop bypass on 6/21/2022 and partial ray amputation right great toe 6/23/2022   was admitted from [facility] for wound dehiscence and infection.   ESR 39 and CRP 14  MRSA PCR negative   6/21 s/p Femoral popliteal bypass with prosthetic graft  6/23 s/p R 1st toe ray amputation, Wound culture from OR with MSSA and enterococcus fecalis   Pathology showed OM in margines so need 6 weeks treatment from the start   Was on Unasyn to complete 6 weeks on 7/26   Now growing Klebsiella and enterobacter (R to unasy and zosyn)    Recommendations:  - Blood cultures NGTD  - Wound culture with Klebsiella and enterobacter, both sensitive to cipro   - Vascular surgery and podiatry consults noted   - Will stop zosyn and start ciprofloxacin 500mg daily po, will treat for 2 weeks total and see her in wound center   - No need for vancomycin     Will follow PRN.    Geovany Long MD  Division of Infectious Diseases   Please call ID service at [phone] with any question.      35 minutes spent on total encounter assessing patient, examination, chart reivew, counseling and coordinating care by the attending physician/nurse/care manager.

## 2022-07-18 NOTE — PROGRESS NOTE ADULT - SUBJECTIVE AND OBJECTIVE BOX
71 year old Female seen at [facility] 1EAS 113 D1 for partially dehisced surgical site right 1st ray ulcer. Pt denies any fever, chills, nausea, vomiting, chest pain, shortness of breath, or calf pain at this time.    Allergies    latex (Unknown)  No Known Drug Allergies    Intolerances        MEDICATIONS  (STANDING):  aspirin enteric coated 81 milliGRAM(s) Oral daily  atorvastatin 40 milliGRAM(s) Oral at bedtime  calcium acetate 667 milliGRAM(s) Oral three times a day with meals  ciprofloxacin     Tablet 500 milliGRAM(s) Oral daily  cloNIDine 0.1 milliGRAM(s) Oral two times a day  dextrose 5%. 1000 milliLiter(s) (50 mL/Hr) IV Continuous <Continuous>  dextrose 5%. 1000 milliLiter(s) (100 mL/Hr) IV Continuous <Continuous>  dextrose 50% Injectable 25 Gram(s) IV Push once  dextrose 50% Injectable 12.5 Gram(s) IV Push once  dextrose 50% Injectable 25 Gram(s) IV Push once  epoetin fawad-epbx (RETACRIT) Injectable 62902 Unit(s) IV Push <User Schedule>  glucagon  Injectable 1 milliGRAM(s) IntraMuscular once  heparin   Injectable 5000 Unit(s) SubCutaneous every 8 hours  imipramine 50 milliGRAM(s) Oral daily  insulin glargine Injectable (LANTUS) 10 Unit(s) SubCutaneous at bedtime  insulin lispro (ADMELOG) corrective regimen sliding scale   SubCutaneous three times a day before meals  insulin lispro (ADMELOG) corrective regimen sliding scale   SubCutaneous at bedtime  insulin lispro Injectable (ADMELOG) 2 Unit(s) SubCutaneous three times a day before meals  lactobacillus acidophilus 1 Tablet(s) Oral two times a day  metoprolol tartrate 100 milliGRAM(s) Oral every 12 hours  Nephro-elvie 1 Tablet(s) Oral daily  pantoprazole    Tablet 40 milliGRAM(s) Oral before breakfast  risperiDONE   Tablet 0.5 milliGRAM(s) Oral two times a day  senna 2 Tablet(s) Oral at bedtime  zinc sulfate 220 milliGRAM(s) Oral daily    MEDICATIONS  (PRN):  acetaminophen     Tablet .. 650 milliGRAM(s) Oral every 6 hours PRN Temp greater or equal to 38C (100.4F), Mild Pain (1 - 3)  aluminum hydroxide/magnesium hydroxide/simethicone Suspension 30 milliLiter(s) Oral every 4 hours PRN Dyspepsia  bisacodyl Suppository 10 milliGRAM(s) Rectal daily PRN Constipation  dextrose Oral Gel 15 Gram(s) Oral once PRN Blood Glucose LESS THAN 70 milliGRAM(s)/deciliter  melatonin 3 milliGRAM(s) Oral at bedtime PRN Insomnia  ondansetron Injectable 4 milliGRAM(s) IV Push every 8 hours PRN Nausea and/or Vomiting  traMADol 50 milliGRAM(s) Oral three times a day PRN Moderate Pain (4 - 6)      Vital Signs Last 24 Hrs  T(C): 36.3 (18 Jul 2022 12:57), Max: 37.1 (18 Jul 2022 05:16)  T(F): 97.4 (18 Jul 2022 12:57), Max: 98.7 (18 Jul 2022 05:16)  HR: 68 (18 Jul 2022 12:57) (68 - 83)  BP: 117/61 (18 Jul 2022 12:57) (117/61 - 147/69)  BP(mean): --  RR: 18 (18 Jul 2022 12:57) (18 - 18)  SpO2: 96% (18 Jul 2022 12:57) (96% - 98%)    Parameters below as of 18 Jul 2022 12:57  Patient On (Oxygen Delivery Method): room air        PHYSICAL EXAM:  Vascular: DP & PT non-palpable bilaterally, Capillary refill 3 seconds, Digital hair absent bilaterally  Neurological: Light touch sensation diminished bilaterally  Musculoskeletal: 5/5 strength in all quadrants bilaterally, AJ & STJ ROM intact  Dermatological: 3 x 1 cm ulceration noted to the Right foot, 1st ray surgical site wound dehiscence, fibrogranular wound bed, no probe to bone, no periwound erythema, no fluctuance, no malodor, no proximal streaking at this time. Remainder of sutures to the 1st ray intact.         ----------CHEM PANEL----------

## 2022-07-18 NOTE — CHART NOTE - NSCHARTNOTEFT_GEN_A_CORE
Sutures removed from R groin and R calf  No evidence of infection  R DP signal  No further vascular surgical needs  Will sign off  Reconsult prn

## 2022-07-18 NOTE — CONSULT NOTE ADULT - CONSULT REQUESTED DATE/TIME
15-Jul-2022 01:38
14-Jul-2022
15-Jul-2022 11:13
18-Jul-2022 11:45
15-Jul-2022 11:15
15-Jul-2022 13:23

## 2022-07-18 NOTE — SWALLOW BEDSIDE ASSESSMENT ADULT - COMMENTS
Pt received sitting upright in chair, awake and cooperative, on room air. Pt was agreeable to assessment. Pt followed simple commands independently. Pt's vocal quality/intensity and speech production was WFL. Pt denied pain pre and post assessment.  RN reported pt is tolerating current diet w/o difficulty.    Pt known to this service from a previous admission. Clinical swallow assessment completed 7/1/22, at which time pt was recommended soft and bite sized with mildly thick liquids. Pt reported she has been consuming a regular diet with thin liquids PTA.    CXR 7/14: " Clear lungs at this time" Pt's WBC is WFL, no fever.

## 2022-07-18 NOTE — SWALLOW BEDSIDE ASSESSMENT ADULT - SLP PERTINENT HISTORY OF CURRENT PROBLEM
Per charting, "71 year old female with PMH of Afib (not on AC for hx of multiple falls), T2DM, HTN, HLD, ESRD on HD (MWF), CHF, CAD s/p CABG, PAD S/P R-->L bifem-fem BK pop bypass, recent fempop bypass (6/21/2022), and partial ray amputation right great toe (6/22/2022) presented today from [facility] for wound checkup.  Patient states she noticed something wrong with her surgery site a week after since she has started putting weight on it.  She was recently discharged from  in 7/7 to [facility].  c/o pain 2 weeks ago especially if she puts pressure on her right foot.  Denies fever or chills .Admitted for podiatry evaluation and septic workup"

## 2022-07-18 NOTE — CONSULT NOTE ADULT - ASSESSMENT
71 year old female with PMH of Afib (not on AC for hx of multiple falls), T2DM, HTN, HLD, ESRD on HD (MWF), CHF, CAD s/p CABG, PAD S/P R-->L bifem-fem BK pop bypass, recent fempop bypass (6/21/2022), and partial ray amputation right great toe (6/22/2022), admitted for treatment of osteomyelitis of right foot. Her diabetes is uncontrolled, with Hgb A1C of 9.0. Here she is currently on insulin lispro sliding scale and at bedtime, with her blood glucose levels from 277 to 459 in the past 24 hours. 71 year old female with PMH of Afib (not on AC for hx of multiple falls), T2DM, HTN, HLD, ESRD on HD (MWF), CHF, CAD s/p CABG, PAD S/P R-->L bifem-fem BK pop bypass, recent fempop bypass (6/21/2022), and partial ray amputation right great toe (6/22/2022), admitted for treatment of osteomyelitis of right foot. Her diabetes is uncontrolled, with Hgb A1C of 9.0. Here she is currently on insulin lispro sliding scale and at bedtime, with her blood glucose levels from 277 to 459 in the past 24 hours. She required 17 units of insulin in the last 24 hours.

## 2022-07-18 NOTE — PHYSICAL THERAPY INITIAL EVALUATION ADULT - PERTINENT HX OF CURRENT PROBLEM, REHAB EVAL
71F pmhx of afib,  T2DM, HTN, HLD, ESRD on HD (MWF), CHF, CAD s/p CABG, PAD S/P R-->L bifem-fem BK pop bypass, recent fempop bypass (6/21/2022), and partial ray amputation right great toe (6/22/2022) presented today from [facility] for wound dehiscence and infection.

## 2022-07-18 NOTE — PROGRESS NOTE ADULT - SUBJECTIVE AND OBJECTIVE BOX
Patient is a 71y Female whom presented to the hospital with esrd on hd     PAST MEDICAL & SURGICAL HISTORY:  Diabetes mellitus II      HTN (hypertension)      h/o Anxiety attack      Depression      h/o Myocardial infarct 2007      CAD (coronary artery disease)      h/o Hepatitis A 1969  currently resolved, no symptoms      PAD (peripheral artery disease)      Murmur, cardiac      h/o Smoking  quitted 3/2012      CRF (chronic renal failure), unspecified stage      Dialysis patient      Anemia secondary to renal failure      HTN (hypertension)      Osteomyelitis      ESRD on dialysis      Falls      Ataxia      coronary stent 2007      s/p Ovarian cyst removal      s/p surgical removal of benign Skin lesion epigastric area      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          MEDICATIONS  (STANDING):  aspirin enteric coated 81 milliGRAM(s) Oral daily  atorvastatin 40 milliGRAM(s) Oral at bedtime  calcium acetate 667 milliGRAM(s) Oral three times a day with meals  cloNIDine 0.1 milliGRAM(s) Oral two times a day  imipramine 50 milliGRAM(s) Oral daily  lactobacillus acidophilus 1 Tablet(s) Oral two times a day  metoprolol tartrate 100 milliGRAM(s) Oral every 12 hours  Nephro-elvie 1 Tablet(s) Oral daily  pantoprazole    Tablet 40 milliGRAM(s) Oral before breakfast  risperiDONE   Tablet 0.5 milliGRAM(s) Oral two times a day  senna 2 Tablet(s) Oral at bedtime  zinc sulfate 220 milliGRAM(s) Oral daily      Allergies    latex (Unknown)  No Known Drug Allergies    Intolerances        SOCIAL HISTORY:  Denies ETOh,Smoking,     FAMILY HISTORY:      REVIEW OF SYSTEMS:    CONSTITUTIONAL: No weakness, fevers or chills  RESPIRATORY: No cough, wheezing, hemoptysis; No shortness of breath  CARDIOVASCULAR: No chest pain or palpitations  GASTROINTESTINAL: No abdominal or epigastric pain. No nausea, vomiting,     No diarrhea or constipation. No melena   SKIN: dry                            8.3    11.76 )-----------( 365      ( 18 Jul 2022 14:00 )             26.6       CBC Full  -  ( 18 Jul 2022 14:00 )  WBC Count : 11.76 K/uL  RBC Count : 2.84 M/uL  Hemoglobin : 8.3 g/dL  Hematocrit : 26.6 %  Platelet Count - Automated : 365 K/uL  Mean Cell Volume : 93.7 fl  Mean Cell Hemoglobin : 29.2 pg  Mean Cell Hemoglobin Concentration : 31.2 gm/dL  Auto Neutrophil # : x  Auto Lymphocyte # : x  Auto Monocyte # : x  Auto Eosinophil # : x  Auto Basophil # : x  Auto Neutrophil % : x  Auto Lymphocyte % : x  Auto Monocyte % : x  Auto Eosinophil % : x  Auto Basophil % : x      07-18    133<L>  |  95<L>  |  69<H>  ----------------------------<  389<H>  4.2   |  29  |  5.80<H>    Ca    9.5      18 Jul 2022 14:00        CAPILLARY BLOOD GLUCOSE      POCT Blood Glucose.: 153 mg/dL (18 Jul 2022 17:23)  POCT Blood Glucose.: 155 mg/dL (18 Jul 2022 16:35)  POCT Blood Glucose.: 359 mg/dL (18 Jul 2022 11:59)  POCT Blood Glucose.: 357 mg/dL (18 Jul 2022 07:39)  POCT Blood Glucose.: 378 mg/dL (17 Jul 2022 20:54)      Vital Signs Last 24 Hrs  T(C): 36.5 (18 Jul 2022 13:45), Max: 37.1 (18 Jul 2022 05:16)  T(F): 97.7 (18 Jul 2022 13:45), Max: 98.7 (18 Jul 2022 05:16)  HR: 74 (18 Jul 2022 13:45) (68 - 83)  BP: 130/46 (18 Jul 2022 13:45) (117/61 - 147/69)  BP(mean): --  RR: 18 (18 Jul 2022 13:45) (18 - 18)  SpO2: 100% (18 Jul 2022 13:45) (96% - 100%)    Parameters below as of 18 Jul 2022 13:45  Patient On (Oxygen Delivery Method): room air                PHYSICAL EXAM:    Constitutional: NAD  HEENT: conjunctive   clear   Neck:  No JVD  Respiratory: CTAB  Cardiovascular: S1 and S2  Gastrointestinal: BS+, soft, NT/ND  Extremities: right foot dressing

## 2022-07-18 NOTE — CONSULT NOTE ADULT - REASON FOR ADMISSION
R foot wound infection

## 2022-07-18 NOTE — PROGRESS NOTE ADULT - SUBJECTIVE AND OBJECTIVE BOX
Patient is a 71y Female with a known history of :  Cellulitis [L03.90]    Acute on chronic osteomyelitis [M86.10]    DM (diabetes mellitus) [E11.9]    HTN (hypertension) [I10]    ESRD on hemodialysis [N18.6]    Prophylactic measure [Z29.9]    Dehiscence of wound [T81.30XA]      HPI:  71 year old female with PMH of Afib (not on AC for hx of multiple falls), T2DM, HTN, HLD, ESRD on HD (MWF), CHF, CAD s/p CABG, PAD S/P R-->L bifem-fem BK pop bypass, recent fempop bypass (6/21/2022), and partial ray amputation right great toe (6/22/2022) presented today from [facility] for wound checkup.  Patient states she noticed something wrong with her surgery site a week after since she has started putting weight on it.  She was recently discharged from  in 7/7 to [facility].  c/o pain 2 weeks ago especially if she puts pressure on her right foot.  Denies fever or chills .Admitted for podiatry evaluation and septic workup ,[facility] Palliative care consult requested ,to discuss advance directives and complete [facility] Pathology Final Diagnosis  06/23/22 1. Right hallux -Acute and chronic osteomyelitis. -Gangrenous skin and soft tissue. 2. Right first metatarsal/clean margin: -Minimal chronic osteomyelitis. Patient was on iv vancomycin with dialysis at [facility] . (14 Jul 2022 19:42)      REVIEW OF SYSTEMS:    CONSTITUTIONAL: No fever, weight loss, or fatigue  EYES: No eye pain, visual disturbances, or discharge  ENMT:  No difficulty hearing, tinnitus, vertigo; No sinus or throat pain  NECK: No pain or stiffness  BREASTS: No pain, masses, or nipple discharge  RESPIRATORY: No cough, wheezing, chills or hemoptysis; No shortness of breath  CARDIOVASCULAR: No chest pain, palpitations, dizziness, or leg swelling  GASTROINTESTINAL: No abdominal or epigastric pain. No nausea, vomiting, or hematemesis; No diarrhea or constipation. No melena or hematochezia.  GENITOURINARY: No dysuria, frequency, hematuria, or incontinence  NEUROLOGICAL: No headaches, memory loss, loss of strength, numbness, or tremors  SKIN: No itching, burning, rashes, or lesions   LYMPH NODES: No enlarged glands  ENDOCRINE: No heat or cold intolerance; No hair loss  MUSCULOSKELETAL: No joint pain or swelling; No muscle, back, or extremity pain  PSYCHIATRIC: No depression, anxiety, mood swings, or difficulty sleeping  HEME/LYMPH: No easy bruising, or bleeding gums  ALLERGY AND IMMUNOLOGIC: No hives or eczema    MEDICATIONS  (STANDING):  aspirin enteric coated 81 milliGRAM(s) Oral daily  atorvastatin 40 milliGRAM(s) Oral at bedtime  calcium acetate 667 milliGRAM(s) Oral three times a day with meals  cloNIDine 0.1 milliGRAM(s) Oral two times a day  dextrose 5%. 1000 milliLiter(s) (50 mL/Hr) IV Continuous <Continuous>  dextrose 5%. 1000 milliLiter(s) (100 mL/Hr) IV Continuous <Continuous>  dextrose 50% Injectable 25 Gram(s) IV Push once  dextrose 50% Injectable 12.5 Gram(s) IV Push once  dextrose 50% Injectable 25 Gram(s) IV Push once  epoetin fawad-epbx (RETACRIT) Injectable 97248 Unit(s) IV Push <User Schedule>  glucagon  Injectable 1 milliGRAM(s) IntraMuscular once  heparin   Injectable 5000 Unit(s) SubCutaneous every 8 hours  imipramine 50 milliGRAM(s) Oral daily  insulin lispro (ADMELOG) corrective regimen sliding scale   SubCutaneous three times a day before meals  insulin lispro (ADMELOG) corrective regimen sliding scale   SubCutaneous at bedtime  lactobacillus acidophilus 1 Tablet(s) Oral two times a day  metoprolol tartrate 100 milliGRAM(s) Oral every 12 hours  Nephro-elvie 1 Tablet(s) Oral daily  pantoprazole    Tablet 40 milliGRAM(s) Oral before breakfast  piperacillin/tazobactam IVPB.. 3.375 Gram(s) IV Intermittent every 12 hours  risperiDONE   Tablet 0.5 milliGRAM(s) Oral two times a day  senna 2 Tablet(s) Oral at bedtime  zinc sulfate 220 milliGRAM(s) Oral daily    MEDICATIONS  (PRN):  acetaminophen     Tablet .. 650 milliGRAM(s) Oral every 6 hours PRN Temp greater or equal to 38C (100.4F), Mild Pain (1 - 3)  aluminum hydroxide/magnesium hydroxide/simethicone Suspension 30 milliLiter(s) Oral every 4 hours PRN Dyspepsia  bisacodyl Suppository 10 milliGRAM(s) Rectal daily PRN Constipation  dextrose Oral Gel 15 Gram(s) Oral once PRN Blood Glucose LESS THAN 70 milliGRAM(s)/deciliter  melatonin 3 milliGRAM(s) Oral at bedtime PRN Insomnia  ondansetron Injectable 4 milliGRAM(s) IV Push every 8 hours PRN Nausea and/or Vomiting  traMADol 50 milliGRAM(s) Oral three times a day PRN Moderate Pain (4 - 6)      ALLERGIES: latex (Unknown)  No Known Drug Allergies      FAMILY HISTORY:      PHYSICAL EXAMINATION:  -----------------------------  T(C): 37.1 (07-18-22 @ 05:16), Max: 37.1 (07-18-22 @ 05:16)  HR: 78 (07-18-22 @ 05:16) (68 - 83)  BP: 136/71 (07-18-22 @ 05:16) (126/66 - 147/69)  RR: 18 (07-18-22 @ 05:16) (18 - 18)  SpO2: 98% (07-18-22 @ 05:16) (96% - 98%)  Wt(kg): --        VITALS  T(C): 37.1 (07-18-22 @ 05:16), Max: 37.1 (07-18-22 @ 05:16)  HR: 78 (07-18-22 @ 05:16) (68 - 83)  BP: 136/71 (07-18-22 @ 05:16) (126/66 - 147/69)  RR: 18 (07-18-22 @ 05:16) (18 - 18)  SpO2: 98% (07-18-22 @ 05:16) (96% - 98%)    Constitutional: well developed, normal appearance, well groomed, well nourished, no deformities and no acute distress.   Eyes: the conjunctiva exhibited no abnormalities and the eyelids demonstrated no xanthelasmas.   HEENT: normal oral mucosa, no oral pallor and no oral cyanosis.   Neck: normal jugular venous A waves present, normal jugular venous V waves present and no jugular venous wilson A waves.   Pulmonary: no respiratory distress, normal respiratory rhythm and effort, no accessory muscle use and lungs were clear to auscultation bilaterally.   Cardiovascular: heart rate and rhythm were normal, normal S1 and S2 and no murmur, gallop, rub, heave or thrill are present.   Abdomen: soft, non-tender, no hepato-splenomegaly and no abdominal mass palpated.   Musculoskeletal: the gait could not be assessed..   Extremities: no clubbing of the fingernails, no localized cyanosis, no petechial hemorrhages and no ischemic changes.   Skin: normal skin color and pigmentation, no rash, no venous stasis, no skin lesions, no skin ulcer and no xanthoma was observed.   Psychiatric: oriented to person, place, and time, the affect was normal, the mood was normal and not feeling anxious.     LABS:   --------                     RADIOLOGY:  -----------------    ECG:     ECHO:

## 2022-07-18 NOTE — PHYSICAL THERAPY INITIAL EVALUATION ADULT - ADDITIONAL COMMENTS
Patient admitted from [facility] (long term per SW). Patient reports that she was ambulating with RW and PT, short distances. Requires assist for all ADLs.

## 2022-07-18 NOTE — SWALLOW BEDSIDE ASSESSMENT ADULT - PHARYNGEAL PHASE
Within functional limits Principal Discharge DX:	Patella fracture  Instructions for follow-up, activity and diet:	Please follow up with Dr. Lugo on Monday in regards to your symptoms or as soon as you can next week.  The number for his clinic is [phone].  Please take tylenol, 1000mg, every 6 hours as needed for pain.  You may take tramadol, 50 mg every 6 hours as needed for breakthrough pain.  Drink at least 1 Liter or 32 Ounces of water each day.  Return for any persistent, worsening symptoms, or ANY concerns at all. Principal Discharge DX:	Patella fracture  Goal:	left patella, displaced  Instructions for follow-up, activity and diet:	Please follow up with Dr. Lugo on Monday in regards to your symptoms or as soon as you can next week.  The number for his clinic is [phone].  Please take tylenol, 1000mg, every 6 hours as needed for pain.  You may take tramadol, 50 mg every 6 hours as needed for breakthrough pain.  Drink at least 1 Liter or 32 Ounces of water each day.  Return for any persistent, worsening symptoms, or ANY concerns at all.

## 2022-07-18 NOTE — PROGRESS NOTE ADULT - SUBJECTIVE AND OBJECTIVE BOX
PROGRESS NOTE  Patient is a 71y old  Female who presents with a chief complaint of R foot wound infection (18 Jul 2022 13:02)  Chart and available morning labs /imaging are reviewed electronically , urgent issues addressed . More information  is being added upon completion of rounds , when more information is collected and management discussed with consultants , medical staff and social service/case management on the floor     OVERNIGHT  No new issues reported by medical staff . All above noted Patient is resting in a bed comfortably .Confused ,poor mentation .No distress noted   RLE sutures removed .Case d/w  -cleared for d/c   HPI:  71 year old female with PMH of Afib (not on AC for hx of multiple falls), T2DM, HTN, HLD, ESRD on HD (MWF), CHF, CAD s/p CABG, PAD S/P R-->L bifem-fem BK pop bypass, recent fempop bypass (6/21/2022), and partial ray amputation right great toe (6/22/2022) presented today from [facility] for wound checkup.  Patient states she noticed something wrong with her surgery site a week after since she has started putting weight on it.  She was recently discharged from  in 7/7 to [facility].  c/o pain 2 weeks ago especially if she puts pressure on her right foot.  Denies fever or chills .Admitted for podiatry evaluation and septic workup ,[facility] Palliative care consult requested ,to discuss advance directives and complete [facility] Pathology Final Diagnosis  06/23/22 1. Right hallux -Acute and chronic osteomyelitis. -Gangrenous skin and soft tissue. 2. Right first metatarsal/clean margin: -Minimal chronic osteomyelitis. Patient was on iv vancomycin with dialysis at [facility] . (14 Jul 2022 19:42)    PAST MEDICAL & SURGICAL HISTORY:  Diabetes mellitus II      HTN (hypertension)      PAD (peripheral artery disease)      h/o Anxiety attack      Depression      h/o Myocardial infarct 2007      CAD (coronary artery disease)      h/o Hepatitis A 1969  currently resolved, no symptoms      Murmur, cardiac      h/o Smoking  quitted 3/2012      CRF (chronic renal failure), unspecified stage      Dialysis patient      Anemia secondary to renal failure      HTN (hypertension)      Osteomyelitis      ESRD on dialysis      Falls      Ataxia      coronary stent 2007      s/p Ovarian cyst removal      s/p surgical removal of benign Skin lesion epigastric area      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          MEDICATIONS  (STANDING):  aspirin enteric coated 81 milliGRAM(s) Oral daily  atorvastatin 40 milliGRAM(s) Oral at bedtime  calcium acetate 667 milliGRAM(s) Oral three times a day with meals  ciprofloxacin     Tablet 500 milliGRAM(s) Oral daily  cloNIDine 0.1 milliGRAM(s) Oral two times a day  dextrose 5%. 1000 milliLiter(s) (50 mL/Hr) IV Continuous <Continuous>  dextrose 5%. 1000 milliLiter(s) (100 mL/Hr) IV Continuous <Continuous>  dextrose 50% Injectable 25 Gram(s) IV Push once  dextrose 50% Injectable 12.5 Gram(s) IV Push once  dextrose 50% Injectable 25 Gram(s) IV Push once  epoetin fawad-epbx (RETACRIT) Injectable 44063 Unit(s) IV Push <User Schedule>  glucagon  Injectable 1 milliGRAM(s) IntraMuscular once  heparin   Injectable 5000 Unit(s) SubCutaneous every 8 hours  imipramine 50 milliGRAM(s) Oral daily  insulin glargine Injectable (LANTUS) 10 Unit(s) SubCutaneous at bedtime  insulin lispro (ADMELOG) corrective regimen sliding scale   SubCutaneous three times a day before meals  insulin lispro (ADMELOG) corrective regimen sliding scale   SubCutaneous at bedtime  insulin lispro Injectable (ADMELOG) 2 Unit(s) SubCutaneous three times a day before meals  lactobacillus acidophilus 1 Tablet(s) Oral two times a day  metoprolol tartrate 100 milliGRAM(s) Oral every 12 hours  Nephro-elvie 1 Tablet(s) Oral daily  pantoprazole    Tablet 40 milliGRAM(s) Oral before breakfast  risperiDONE   Tablet 0.5 milliGRAM(s) Oral two times a day  senna 2 Tablet(s) Oral at bedtime  zinc sulfate 220 milliGRAM(s) Oral daily    MEDICATIONS  (PRN):  acetaminophen     Tablet .. 650 milliGRAM(s) Oral every 6 hours PRN Temp greater or equal to 38C (100.4F), Mild Pain (1 - 3)  aluminum hydroxide/magnesium hydroxide/simethicone Suspension 30 milliLiter(s) Oral every 4 hours PRN Dyspepsia  bisacodyl Suppository 10 milliGRAM(s) Rectal daily PRN Constipation  dextrose Oral Gel 15 Gram(s) Oral once PRN Blood Glucose LESS THAN 70 milliGRAM(s)/deciliter  melatonin 3 milliGRAM(s) Oral at bedtime PRN Insomnia  ondansetron Injectable 4 milliGRAM(s) IV Push every 8 hours PRN Nausea and/or Vomiting  traMADol 50 milliGRAM(s) Oral three times a day PRN Moderate Pain (4 - 6)      OBJECTIVE    T(C): 36.1 (07-18-22 @ 17:38), Max: 37.1 (07-18-22 @ 05:16)  HR: 77 (07-18-22 @ 17:38) (68 - 83)  BP: 134/71 (07-18-22 @ 17:38) (117/61 - 146/57)  RR: 18 (07-18-22 @ 17:38) (15 - 18)  SpO2: 96% (07-18-22 @ 17:38) (96% - 100%)  Wt(kg): --  I&O's Summary    18 Jul 2022 07:01  -  18 Jul 2022 20:11  --------------------------------------------------------  IN: 0 mL / OUT: 1500 mL / NET: -1500 mL          REVIEW OF SYSTEMS:  CONSTITUTIONAL: No fever, weight loss, or fatigue  EYES: No eye pain, visual disturbances, or discharge  ENMT:   No sinus or throat pain  NECK: No pain or stiffness  RESPIRATORY: No cough, wheezing, chills or hemoptysis; No shortness of breath  CARDIOVASCULAR: No chest pain, palpitations, dizziness, or leg swelling  GASTROINTESTINAL: No abdominal pain. No nausea, vomiting; No diarrhea or constipation. No melena or hematochezia.  GENITOURINARY: No dysuria, frequency, hematuria, or incontinence  NEUROLOGICAL: No headaches, memory loss, loss of strength, numbness, or tremors  SKIN: No itching, burning, rashes, or lesions   MUSCULOSKELETAL: No joint pain or swelling; No muscle, back, or extremity pain    PHYSICAL EXAM:  Appearance: NAD. VS past 24 hrs -as above   HEENT:   Moist oral mucosa. Conjunctiva clear b/l.   Neck : supple  Respiratory: Lungs CTAB.  Gastrointestinal:  Soft, nontender. No rebound. No rigidity. BS present	  Cardiovascular: RRR ,S1S2 present  Neurologic: Non-focal. Moving all extremities.  Extremities: No edema. No erythema. No calf tenderness.  Skin: No rashes, No ecchymoses, No cyanosis.	  wounds ,skin lesions-See skin assesment flow sheet   LABS:                        8.3    11.76 )-----------( 365      ( 18 Jul 2022 14:00 )             26.6     07-18    133<L>  |  95<L>  |  69<H>  ----------------------------<  389<H>  4.2   |  29  |  5.80<H>    Ca    9.5      18 Jul 2022 14:00      CAPILLARY BLOOD GLUCOSE      POCT Blood Glucose.: 153 mg/dL (18 Jul 2022 17:23)  POCT Blood Glucose.: 155 mg/dL (18 Jul 2022 16:35)  POCT Blood Glucose.: 359 mg/dL (18 Jul 2022 11:59)  POCT Blood Glucose.: 357 mg/dL (18 Jul 2022 07:39)  POCT Blood Glucose.: 378 mg/dL (17 Jul 2022 20:54)          Culture - Abscess with Gram Stain (collected 15 Jul 2022 07:15)  Source: .Abscess R foot  Preliminary Report (18 Jul 2022 10:59):    Numerous Enterobacter cloacae Susceptibility to follow.    Numerous Klebsiella pneumoniae    Rare Bacillus species not anthracis "Susceptibilities not performed"    Rare Stenotrophomonas maltophilia #2 Susceptibility to follow.  Organism: Klebsiella pneumoniae (18 Jul 2022 10:45)  Organism: Klebsiella pneumoniae (18 Jul 2022 10:45)    Culture - Other (collected 14 Jul 2022 20:05)  Source: .Other Other  Preliminary Report (17 Jul 2022 10:30):    Numerous Gram Negative Rods  Organism: Enterobacter cloacae complex (18 Jul 2022 09:56)  Organism: Enterobacter cloacae complex (18 Jul 2022 09:56)    Culture - Blood (collected 14 Jul 2022 15:15)  Source: .Blood Blood-Peripheral  Preliminary Report (16 Jul 2022 01:02):    No growth to date.    Culture - Blood (collected 14 Jul 2022 15:10)  Source: .Blood Blood-Peripheral  Preliminary Report (16 Jul 2022 01:02):    No growth to date.      RADIOLOGY & ADDITIONAL TESTS:   reviewed elctronically 25 minutes aggregate time was spent on this visit, 50% visit time spent in care co-ordination with other attendings and counselling patient .I have discussed care plan with patient / HCP/family member ,who expressed understanding of problems treatment and their effect and side effects, alternatives in details. I have asked if they have any questions and concerns and appropriately addressed them to best of my ability.   ASSESSMENT/PLAN:

## 2022-07-18 NOTE — CONSULT NOTE ADULT - PROBLEM SELECTOR RECOMMENDATION 9
- Continue current lispro sliding scale  - add lantus 10 units at bedtime  - add premeal 2 units of admelog  - continue diet: soft and bite sized consistent carbs   - order C-peptide and glutamic acid decarboxylate antibody tomorrow.  - goal blood glucose between 100-180.  - continue to monitor

## 2022-07-18 NOTE — CONSULT NOTE ADULT - SUBJECTIVE AND OBJECTIVE BOX
Patient is a 71y old  Female who presents with a chief complaint of R foot wound infection (18 Jul 2022 08:14)      Reason For Consult:     HPI:  71 year old female with PMH of Afib (not on AC for hx of multiple falls), T2DM, HTN, HLD, ESRD on HD (MWF), CHF, CAD s/p CABG, PAD S/P R-->L bifem-fem BK pop bypass, recent fempop bypass (6/21/2022), and partial ray amputation right great toe (6/22/2022) presented today from [facility] for wound checkup.  Patient states she noticed something wrong with her surgery site a week after since she has started putting weight on it.  She was recently discharged from  in 7/7 to [facility].  c/o pain 2 weeks ago especially if she puts pressure on her right foot.  Denies fever or chills .Admitted for podiatry evaluation and septic workup ,ID cons Palliative care consult requested ,to discuss advance directives and complete MOLST Pathology Final Diagnosis  06/23/22 1. Right hallux -Acute and chronic osteomyelitis. -Gangrenous skin and soft tissue. 2. Right first metatarsal/clean margin: -Minimal chronic osteomyelitis. Patient was on iv vancomycin with dialysis at [facility] . (14 Jul 2022 19:42)      PAST MEDICAL & SURGICAL HISTORY:  Diabetes mellitus II      HTN (hypertension)      h/o Anxiety attack      Depression      h/o Myocardial infarct 2007      CAD (coronary artery disease)      h/o Hepatitis A 1969  currently resolved, no symptoms      PAD (peripheral artery disease)      Murmur, cardiac      h/o Smoking  quitted 3/2012      CRF (chronic renal failure), unspecified stage      Dialysis patient      Anemia secondary to renal failure      HTN (hypertension)      Osteomyelitis      ESRD on dialysis      Falls      Ataxia      coronary stent 2007      s/p Ovarian cyst removal      s/p surgical removal of benign Skin lesion epigastric area      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          FAMILY HISTORY:        Social History:    MEDICATIONS  (STANDING):  aspirin enteric coated 81 milliGRAM(s) Oral daily  atorvastatin 40 milliGRAM(s) Oral at bedtime  calcium acetate 667 milliGRAM(s) Oral three times a day with meals  cloNIDine 0.1 milliGRAM(s) Oral two times a day  dextrose 5%. 1000 milliLiter(s) (50 mL/Hr) IV Continuous <Continuous>  dextrose 5%. 1000 milliLiter(s) (100 mL/Hr) IV Continuous <Continuous>  dextrose 50% Injectable 25 Gram(s) IV Push once  dextrose 50% Injectable 12.5 Gram(s) IV Push once  dextrose 50% Injectable 25 Gram(s) IV Push once  epoetin fawad-epbx (RETACRIT) Injectable 66675 Unit(s) IV Push <User Schedule>  glucagon  Injectable 1 milliGRAM(s) IntraMuscular once  heparin   Injectable 5000 Unit(s) SubCutaneous every 8 hours  imipramine 50 milliGRAM(s) Oral daily  insulin lispro (ADMELOG) corrective regimen sliding scale   SubCutaneous three times a day before meals  insulin lispro (ADMELOG) corrective regimen sliding scale   SubCutaneous at bedtime  lactobacillus acidophilus 1 Tablet(s) Oral two times a day  metoprolol tartrate 100 milliGRAM(s) Oral every 12 hours  Nephro-elvie 1 Tablet(s) Oral daily  pantoprazole    Tablet 40 milliGRAM(s) Oral before breakfast  piperacillin/tazobactam IVPB.. 3.375 Gram(s) IV Intermittent every 12 hours  risperiDONE   Tablet 0.5 milliGRAM(s) Oral two times a day  senna 2 Tablet(s) Oral at bedtime  zinc sulfate 220 milliGRAM(s) Oral daily    MEDICATIONS  (PRN):  acetaminophen     Tablet .. 650 milliGRAM(s) Oral every 6 hours PRN Temp greater or equal to 38C (100.4F), Mild Pain (1 - 3)  aluminum hydroxide/magnesium hydroxide/simethicone Suspension 30 milliLiter(s) Oral every 4 hours PRN Dyspepsia  bisacodyl Suppository 10 milliGRAM(s) Rectal daily PRN Constipation  dextrose Oral Gel 15 Gram(s) Oral once PRN Blood Glucose LESS THAN 70 milliGRAM(s)/deciliter  melatonin 3 milliGRAM(s) Oral at bedtime PRN Insomnia  ondansetron Injectable 4 milliGRAM(s) IV Push every 8 hours PRN Nausea and/or Vomiting  traMADol 50 milliGRAM(s) Oral three times a day PRN Moderate Pain (4 - 6)        T(C): 37.1 (07-18-22 @ 05:16), Max: 37.1 (07-18-22 @ 05:16)  HR: 78 (07-18-22 @ 05:16) (68 - 83)  BP: 136/71 (07-18-22 @ 05:16) (126/66 - 147/69)  RR: 18 (07-18-22 @ 05:16) (18 - 18)  SpO2: 98% (07-18-22 @ 05:16) (96% - 98%)  Wt(kg): --    PHYSICAL EXAM:  GENERAL: NAD, well-groomed, well-developed  HEAD:  Atraumatic, Normocephalic  NECK: Supple, No JVD, Normal thyroid  CHEST/LUNG: Clear to percussion bilaterally; No rales, rhonchi, wheezing, or rubs  HEART: Regular rate and rhythm; No murmurs, rubs, or gallops  ABDOMEN: Soft, Nontender, Nondistended; Bowel sounds present  EXTREMITIES:  2+ Peripheral Pulses, No clubbing, cyanosis, or edema  SKIN: No rashes or lesions    CAPILLARY BLOOD GLUCOSE      POCT Blood Glucose.: 357 mg/dL (18 Jul 2022 07:39)  POCT Blood Glucose.: 378 mg/dL (17 Jul 2022 20:54)  POCT Blood Glucose.: 277 mg/dL (17 Jul 2022 17:29)          CMP:      Thyroid Function Tests:      Diabetes Tests:       Radiology:                  Patient is a 71y old  Female who presents with a chief complaint of R foot wound infection (18 Jul 2022 08:14).  She was examined at the bedside. She states that her right foot still hurts, but otherwise has no complaints.   She denies headache, dizziness, fever, chills, SOB, chest pain, abd pain, N/V, dysuria, hematuria, leg swelling.      Reason For Consult: uncontrolled DMT2    HPI:  71 year old female with PMH of Afib (not on AC for hx of multiple falls), T2DM, HTN, HLD, ESRD on HD (MWF), CHF, CAD s/p CABG, PAD S/P R-->L bifem-fem BK pop bypass, recent fempop bypass (6/21/2022), and partial ray amputation right great toe (6/22/2022) presented today from [facility] for wound checkup.  Patient states she noticed something wrong with her surgery site a week after since she has started putting weight on it.  She was recently discharged from  in 7/7 to [facility].  c/o pain 2 weeks ago especially if she puts pressure on her right foot.  Denies fever or chills .Admitted for podiatry evaluation and septic workup ,ID cons Palliative care consult requested ,to discuss advance directives and complete MOLST Pathology Final Diagnosis  06/23/22 1. Right hallux -Acute and chronic osteomyelitis. -Gangrenous skin and soft tissue. 2. Right first metatarsal/clean margin: -Minimal chronic osteomyelitis. Patient was on iv vancomycin with dialysis at [facility] . (14 Jul 2022 19:42)      PAST MEDICAL & SURGICAL HISTORY:  Diabetes mellitus II      HTN (hypertension)      h/o Anxiety attack      Depression      h/o Myocardial infarct 2007      CAD (coronary artery disease)      h/o Hepatitis A 1969  currently resolved, no symptoms      PAD (peripheral artery disease)      Murmur, cardiac      h/o Smoking  quitted 3/2012      CRF (chronic renal failure), unspecified stage      Dialysis patient      Anemia secondary to renal failure      HTN (hypertension)      Osteomyelitis      ESRD on dialysis      Falls      Ataxia      coronary stent 2007      s/p Ovarian cyst removal      s/p surgical removal of benign Skin lesion epigastric area      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          FAMILY HISTORY:        Social History:    MEDICATIONS  (STANDING):  aspirin enteric coated 81 milliGRAM(s) Oral daily  atorvastatin 40 milliGRAM(s) Oral at bedtime  calcium acetate 667 milliGRAM(s) Oral three times a day with meals  cloNIDine 0.1 milliGRAM(s) Oral two times a day  dextrose 5%. 1000 milliLiter(s) (50 mL/Hr) IV Continuous <Continuous>  dextrose 5%. 1000 milliLiter(s) (100 mL/Hr) IV Continuous <Continuous>  dextrose 50% Injectable 25 Gram(s) IV Push once  dextrose 50% Injectable 12.5 Gram(s) IV Push once  dextrose 50% Injectable 25 Gram(s) IV Push once  epoetin fawad-epbx (RETACRIT) Injectable 44334 Unit(s) IV Push <User Schedule>  glucagon  Injectable 1 milliGRAM(s) IntraMuscular once  heparin   Injectable 5000 Unit(s) SubCutaneous every 8 hours  imipramine 50 milliGRAM(s) Oral daily  insulin lispro (ADMELOG) corrective regimen sliding scale   SubCutaneous three times a day before meals  insulin lispro (ADMELOG) corrective regimen sliding scale   SubCutaneous at bedtime  lactobacillus acidophilus 1 Tablet(s) Oral two times a day  metoprolol tartrate 100 milliGRAM(s) Oral every 12 hours  Nephro-elvie 1 Tablet(s) Oral daily  pantoprazole    Tablet 40 milliGRAM(s) Oral before breakfast  piperacillin/tazobactam IVPB.. 3.375 Gram(s) IV Intermittent every 12 hours  risperiDONE   Tablet 0.5 milliGRAM(s) Oral two times a day  senna 2 Tablet(s) Oral at bedtime  zinc sulfate 220 milliGRAM(s) Oral daily    MEDICATIONS  (PRN):  acetaminophen     Tablet .. 650 milliGRAM(s) Oral every 6 hours PRN Temp greater or equal to 38C (100.4F), Mild Pain (1 - 3)  aluminum hydroxide/magnesium hydroxide/simethicone Suspension 30 milliLiter(s) Oral every 4 hours PRN Dyspepsia  bisacodyl Suppository 10 milliGRAM(s) Rectal daily PRN Constipation  dextrose Oral Gel 15 Gram(s) Oral once PRN Blood Glucose LESS THAN 70 milliGRAM(s)/deciliter  melatonin 3 milliGRAM(s) Oral at bedtime PRN Insomnia  ondansetron Injectable 4 milliGRAM(s) IV Push every 8 hours PRN Nausea and/or Vomiting  traMADol 50 milliGRAM(s) Oral three times a day PRN Moderate Pain (4 - 6)        T(C): 37.1 (07-18-22 @ 05:16), Max: 37.1 (07-18-22 @ 05:16)  HR: 78 (07-18-22 @ 05:16) (68 - 83)  BP: 136/71 (07-18-22 @ 05:16) (126/66 - 147/69)  RR: 18 (07-18-22 @ 05:16) (18 - 18)  SpO2: 98% (07-18-22 @ 05:16) (96% - 98%)  Wt(kg): --    PHYSICAL EXAM:  GENERAL: NAD, awake and alert  HEAD:  Atraumatic, Normocephalic  CHEST/LUNG: No rales, rhonchi, wheezing, or rubs  HEART: Regular rate and rhythm; No murmurs, rubs, or gallops  ABDOMEN: Soft, Nontender, Nondistended; Bowel sounds present  EXTREMITIES: No clubbing, cyanosis, or edema. bandage in place on right foot  SKIN: No rashes or lesions    CAPILLARY BLOOD GLUCOSE    POCT Blood Glucose.: 357 mg/dL (18 Jul 2022 07:39)  POCT Blood Glucose.: 378 mg/dL (17 Jul 2022 20:54)  POCT Blood Glucose.: 277 mg/dL (17 Jul 2022 17:29)      CMP:        Thyroid Function Tests:      Diabetes Tests:       Radiology:       A/P:             Patient is a 71y old  Female who presents with a chief complaint of R foot wound infection (18 Jul 2022 08:14).  She was examined at the bedside. She states that her right foot still hurts, but otherwise has no complaints.   She denies headache, dizziness, fever, chills, SOB, chest pain, abd pain, N/V, dysuria, hematuria, leg swelling.      Reason For Consult: uncontrolled DMT2    HPI:  71 year old female with PMH of Afib (not on AC for hx of multiple falls), T2DM, HTN, HLD, ESRD on HD (MWF), CHF, CAD s/p CABG, PAD S/P R-->L bifem-fem BK pop bypass, recent fempop bypass (6/21/2022), and partial ray amputation right great toe (6/22/2022) presented today from [facility] for wound checkup.  Patient states she noticed something wrong with her surgery site a week after since she has started putting weight on it.  She was recently discharged from  in 7/7 to [facility].  c/o pain 2 weeks ago especially if she puts pressure on her right foot.  Denies fever or chills .Admitted for podiatry evaluation and septic workup ,ID cons Palliative care consult requested ,to discuss advance directives and complete MOLST Pathology Final Diagnosis  06/23/22 1. Right hallux -Acute and chronic osteomyelitis. -Gangrenous skin and soft tissue. 2. Right first metatarsal/clean margin: -Minimal chronic osteomyelitis. Patient was on iv vancomycin with dialysis at [facility] . (14 Jul 2022 19:42)      PAST MEDICAL & SURGICAL HISTORY:  Diabetes mellitus II      HTN (hypertension)      h/o Anxiety attack      Depression      h/o Myocardial infarct 2007      CAD (coronary artery disease)      h/o Hepatitis A 1969  currently resolved, no symptoms      PAD (peripheral artery disease)      Murmur, cardiac      h/o Smoking  quitted 3/2012      CRF (chronic renal failure), unspecified stage      Dialysis patient      Anemia secondary to renal failure      HTN (hypertension)      Osteomyelitis      ESRD on dialysis      Falls      Ataxia      coronary stent 2007      s/p Ovarian cyst removal      s/p surgical removal of benign Skin lesion epigastric area      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          FAMILY HISTORY:        Social History:    MEDICATIONS  (STANDING):  aspirin enteric coated 81 milliGRAM(s) Oral daily  atorvastatin 40 milliGRAM(s) Oral at bedtime  calcium acetate 667 milliGRAM(s) Oral three times a day with meals  cloNIDine 0.1 milliGRAM(s) Oral two times a day  dextrose 5%. 1000 milliLiter(s) (50 mL/Hr) IV Continuous <Continuous>  dextrose 5%. 1000 milliLiter(s) (100 mL/Hr) IV Continuous <Continuous>  dextrose 50% Injectable 25 Gram(s) IV Push once  dextrose 50% Injectable 12.5 Gram(s) IV Push once  dextrose 50% Injectable 25 Gram(s) IV Push once  epoetin fawad-epbx (RETACRIT) Injectable 35525 Unit(s) IV Push <User Schedule>  glucagon  Injectable 1 milliGRAM(s) IntraMuscular once  heparin   Injectable 5000 Unit(s) SubCutaneous every 8 hours  imipramine 50 milliGRAM(s) Oral daily  insulin lispro (ADMELOG) corrective regimen sliding scale   SubCutaneous three times a day before meals  insulin lispro (ADMELOG) corrective regimen sliding scale   SubCutaneous at bedtime  lactobacillus acidophilus 1 Tablet(s) Oral two times a day  metoprolol tartrate 100 milliGRAM(s) Oral every 12 hours  Nephro-elvie 1 Tablet(s) Oral daily  pantoprazole    Tablet 40 milliGRAM(s) Oral before breakfast  piperacillin/tazobactam IVPB.. 3.375 Gram(s) IV Intermittent every 12 hours  risperiDONE   Tablet 0.5 milliGRAM(s) Oral two times a day  senna 2 Tablet(s) Oral at bedtime  zinc sulfate 220 milliGRAM(s) Oral daily    MEDICATIONS  (PRN):  acetaminophen     Tablet .. 650 milliGRAM(s) Oral every 6 hours PRN Temp greater or equal to 38C (100.4F), Mild Pain (1 - 3)  aluminum hydroxide/magnesium hydroxide/simethicone Suspension 30 milliLiter(s) Oral every 4 hours PRN Dyspepsia  bisacodyl Suppository 10 milliGRAM(s) Rectal daily PRN Constipation  dextrose Oral Gel 15 Gram(s) Oral once PRN Blood Glucose LESS THAN 70 milliGRAM(s)/deciliter  melatonin 3 milliGRAM(s) Oral at bedtime PRN Insomnia  ondansetron Injectable 4 milliGRAM(s) IV Push every 8 hours PRN Nausea and/or Vomiting  traMADol 50 milliGRAM(s) Oral three times a day PRN Moderate Pain (4 - 6)        T(C): 37.1 (07-18-22 @ 05:16), Max: 37.1 (07-18-22 @ 05:16)  HR: 78 (07-18-22 @ 05:16) (68 - 83)  BP: 136/71 (07-18-22 @ 05:16) (126/66 - 147/69)  RR: 18 (07-18-22 @ 05:16) (18 - 18)  SpO2: 98% (07-18-22 @ 05:16) (96% - 98%)  Wt(kg): --    PHYSICAL EXAM:  GENERAL: NAD, awake and alert  HEAD:  Atraumatic, Normocephalic  CHEST/LUNG: No rales, rhonchi, wheezing, or rubs  HEART: Regular rate and rhythm; No murmurs, rubs, or gallops  ABDOMEN: Soft, Nontender, Nondistended; Bowel sounds present  EXTREMITIES: No clubbing, cyanosis, or edema. bandage in place on right foot  SKIN: No rashes or lesions    CAPILLARY BLOOD GLUCOSE    POCT Blood Glucose.: 357 mg/dL (18 Jul 2022 07:39)  POCT Blood Glucose.: 378 mg/dL (17 Jul 2022 20:54)  POCT Blood Glucose.: 277 mg/dL (17 Jul 2022 17:29)          Diabetes Tests:       Radiology:       A/P:  71 year old female with PMH of Afib (not on AC for hx of multiple falls), T2DM, HTN, HLD, ESRD on HD (MWF), CHF, CAD s/p CABG, PAD S/P R-->L bifem-fem BK pop bypass, recent fempop bypass (6/21/2022), and partial ray amputation right great toe (6/22/2022), admitted for treatment of osteomyelitis of right foot. Her diabetes is uncontrolled, with Hgb A1C of 9.0. Here she is currently on insulin lispro sliding scale and at bedtime, with her blood glucose levels from 277 to 459 in the past 24 hours.    #Type 2 Diabetes mellitus  - Continue current lispro sliding scale  - add lantus 10 units at bedtime  - add premeal 2 units of admelog  - get C-peptide and glutamic acid decarboxylate antibody tomorrow.  - goal blood glucose between 100-180.  - continue to monitor       Patient is a 71y old  Female who presents with a chief complaint of R foot wound infection (18 Jul 2022 08:14).  She was examined at the bedside. She states that her right foot still hurts, but otherwise has no complaints.   She denies headache, dizziness, fever, chills, SOB, chest pain, abd pain, N/V, dysuria, hematuria, leg swelling.      Reason For Consult: uncontrolled DMT2    HPI:  71 year old female with PMH of Afib (not on AC for hx of multiple falls), T2DM, HTN, HLD, ESRD on HD (MWF), CHF, CAD s/p CABG, PAD S/P R-->L bifem-fem BK pop bypass, recent fempop bypass (6/21/2022), and partial ray amputation right great toe (6/22/2022) presented today from [facility] for wound checkup.  Patient states she noticed something wrong with her surgery site a week after since she has started putting weight on it.  She was recently discharged from  in 7/7 to [facility].  c/o pain 2 weeks ago especially if she puts pressure on her right foot.  Denies fever or chills .Admitted for podiatry evaluation and septic workup ,ID cons Palliative care consult requested ,to discuss advance directives and complete MOLST Pathology Final Diagnosis  06/23/22 1. Right hallux -Acute and chronic osteomyelitis. -Gangrenous skin and soft tissue. 2. Right first metatarsal/clean margin: -Minimal chronic osteomyelitis. Patient was on iv vancomycin with dialysis at [facility] . (14 Jul 2022 19:42)      PAST MEDICAL & SURGICAL HISTORY:  Diabetes mellitus II    HTN (hypertension)    h/o Anxiety attack    Depression    h/o Myocardial infarct 2007    CAD (coronary artery disease)    h/o Hepatitis A 1969  currently resolved, no symptoms    PAD (peripheral artery disease)    Murmur, cardiac    h/o Smoking  quitted 3/2012    CRF (chronic renal failure), unspecified stage    Dialysis patient    Anemia secondary to renal failure    HTN (hypertension)    Osteomyelitis    ESRD on dialysis    Falls    Ataxia    coronary stent 2007    s/p Ovarian cyst removal    s/p surgical removal of benign Skin lesion epigastric area    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      FAMILY HISTORY:    Social History:    MEDICATIONS  (STANDING):  aspirin enteric coated 81 milliGRAM(s) Oral daily  atorvastatin 40 milliGRAM(s) Oral at bedtime  calcium acetate 667 milliGRAM(s) Oral three times a day with meals  cloNIDine 0.1 milliGRAM(s) Oral two times a day  dextrose 5%. 1000 milliLiter(s) (50 mL/Hr) IV Continuous <Continuous>  dextrose 5%. 1000 milliLiter(s) (100 mL/Hr) IV Continuous <Continuous>  dextrose 50% Injectable 25 Gram(s) IV Push once  dextrose 50% Injectable 12.5 Gram(s) IV Push once  dextrose 50% Injectable 25 Gram(s) IV Push once  epoetin fawad-epbx (RETACRIT) Injectable 37691 Unit(s) IV Push <User Schedule>  glucagon  Injectable 1 milliGRAM(s) IntraMuscular once  heparin   Injectable 5000 Unit(s) SubCutaneous every 8 hours  imipramine 50 milliGRAM(s) Oral daily  insulin lispro (ADMELOG) corrective regimen sliding scale   SubCutaneous three times a day before meals  insulin lispro (ADMELOG) corrective regimen sliding scale   SubCutaneous at bedtime  lactobacillus acidophilus 1 Tablet(s) Oral two times a day  metoprolol tartrate 100 milliGRAM(s) Oral every 12 hours  Nephro-elvie 1 Tablet(s) Oral daily  pantoprazole    Tablet 40 milliGRAM(s) Oral before breakfast  piperacillin/tazobactam IVPB.. 3.375 Gram(s) IV Intermittent every 12 hours  risperiDONE   Tablet 0.5 milliGRAM(s) Oral two times a day  senna 2 Tablet(s) Oral at bedtime  zinc sulfate 220 milliGRAM(s) Oral daily    MEDICATIONS  (PRN):  acetaminophen     Tablet .. 650 milliGRAM(s) Oral every 6 hours PRN Temp greater or equal to 38C (100.4F), Mild Pain (1 - 3)  aluminum hydroxide/magnesium hydroxide/simethicone Suspension 30 milliLiter(s) Oral every 4 hours PRN Dyspepsia  bisacodyl Suppository 10 milliGRAM(s) Rectal daily PRN Constipation  dextrose Oral Gel 15 Gram(s) Oral once PRN Blood Glucose LESS THAN 70 milliGRAM(s)/deciliter  melatonin 3 milliGRAM(s) Oral at bedtime PRN Insomnia  ondansetron Injectable 4 milliGRAM(s) IV Push every 8 hours PRN Nausea and/or Vomiting  traMADol 50 milliGRAM(s) Oral three times a day PRN Moderate Pain (4 - 6)        T(C): 37.1 (07-18-22 @ 05:16), Max: 37.1 (07-18-22 @ 05:16)  HR: 78 (07-18-22 @ 05:16) (68 - 83)  BP: 136/71 (07-18-22 @ 05:16) (126/66 - 147/69)  RR: 18 (07-18-22 @ 05:16) (18 - 18)  SpO2: 98% (07-18-22 @ 05:16) (96% - 98%)  Wt(kg): --    PHYSICAL EXAM:  GENERAL: NAD, awake and alert  HEAD:  Atraumatic, Normocephalic  CHEST/LUNG: No rales, rhonchi, wheezing, or rubs  HEART: Regular rate and rhythm; No murmurs, rubs, or gallops  ABDOMEN: Soft, Nontender, Nondistended; Bowel sounds present  EXTREMITIES: No clubbing, cyanosis, or edema. bandage in place on right foot  SKIN: No rashes or lesions    CAPILLARY BLOOD GLUCOSE    POCT Blood Glucose.: 357 mg/dL (18 Jul 2022 07:39)  POCT Blood Glucose.: 378 mg/dL (17 Jul 2022 20:54)  POCT Blood Glucose.: 277 mg/dL (17 Jul 2022 17:29)    Complete Blood Count + Automated Diff (07.15.22 @ 06:12)   WBC Count: 8.51 K/uL   RBC Count: 3.11 M/uL   Hemoglobin: 9.0 g/dL   Hematocrit: 29.4 %   Mean Cell Volume: 94.5 fl   Mean Cell Hemoglobin: 28.9 pg   Mean Cell Hemoglobin Conc: 30.6 gm/dL   Red Cell Distrib Width: 18.5 %   Platelet Count - Automated: 395 K/uL   Auto Neutrophil #: 5.15 K/uL   Auto Lymphocyte #: 1.45 K/uL   Auto Monocyte #: 1.34 K/uL   Auto Eosinophil #: 0.34 K/uL   Auto Basophil #: 0.12 K/uL   Auto Neutrophil %: 60.6: Differential percentages must be correlated with absolute numbers for   clinical significance. %   Auto Lymphocyte %: 17.0 %   Auto Monocyte %: 15.7 %   Auto Eosinophil %: 4.0 %   Auto Basophil %: 1.4 %   Auto Immature Granulocyte %: 1.3: (Includes meta, myelo and promyelocytes) %   Nucleated RBC: 0 /100 WBCs   Comprehensive Metabolic Panel (07.15.22 @ 06:12)   Sodium, Serum: 139 mmol/L   Potassium, Serum: 4.4 mmol/L   Chloride, Serum: 100 mmol/L   Carbon Dioxide, Serum: 32 mmol/L   Anion Gap, Serum: 7 mmol/L   Blood Urea Nitrogen, Serum: 47 mg/dL   Creatinine, Serum: 5.80 mg/dL   Glucose, Serum: 145 mg/dL   Calcium, Total Serum: 9.5 mg/dL   Protein Total, Serum: 6.1 g/dL   Albumin, Serum: 2.3 g/dL   Bilirubin Total, Serum: 0.3 mg/dL   Alkaline Phosphatase, Serum: 109 U/L   Aspartate Aminotransferase (AST/SGOT): 20 U/L   Alanine Aminotransferase (ALT/SGPT): 14 U/L   eGFR: 7: The estimated glomerular filtration rate (eGFR) is calculated using the 2021 CKD-EPI creatinine equation, which does not have a coefficient for race and is validated in individuals 18 years of age and older (N Engl J Med 2021; 385:0043-0449). Creatinine-based eGFR may be inaccurate in various situations including but not limited to extremes of muscle mass, altered dietary protein intake, or medications that affect renal tubular creatinine secretion. mL/min/1.73m2     Diabetes Tests:   Hemoglobin A1C (7/17/22): 9.0      Radiology:  < from: Xray Foot AP + Lateral + Oblique, Right (07.14.22 @ 17:02) >  ACC: [id number] EXAM:  XR FOOT COMP MIN 3 VIEWS RT                        ACC: [id number] EXAM:  XR CHEST AP OR PA 1V                          PROCEDURE DATE:  07/14/2022    INTERPRETATION:  Chest and right foot. Patient has a wound around the great toe.    AP semierect chest on July 14, 2022 at 4:52 PM.    Heart magnified by technique. On June 30 there were mild lower lung field effusions. Presently lungs are clear.    Right foot. 3 views.    Indication of the distal first metatarsalagain noted. There appears to be some soft tissue ulcer of the distal soft tissues increased from June 23. Arterial calcifications are noted.    IMPRESSION: Soft tissue ulcer at the amputation site around the right first metatarsal. Clear lungs at this time.       Patient is a 71y old  Female who presents with a chief complaint of R foot wound infection (18 Jul 2022 08:14).  She was examined at the bedside. She states that her right foot still hurts, but otherwise has no complaints.   She denies headache, dizziness, fever, chills, SOB, chest pain, abd pain, N/V, dysuria, hematuria, leg swelling.  Her outpatient Insulin regimen is Admelog Solostar before meals (unknown # units) and insulin glargine 12 units at bedtime.  The patient is unsure who her endocrinologist is.  She states she previously was on metformin, with no side effects.    Reason For Consult: uncontrolled DMT2    HPI:  71 year old female with PMH of Afib (not on AC for hx of multiple falls), T2DM, HTN, HLD, ESRD on HD (MWF), CHF, CAD s/p CABG, PAD S/P R-->L bifem-fem BK pop bypass, recent fempop bypass (6/21/2022), and partial ray amputation right great toe (6/22/2022) presented today from [facility] for wound checkup.  Patient states she noticed something wrong with her surgery site a week after since she has started putting weight on it.  She was recently discharged from  in 7/7 to [facility].  c/o pain 2 weeks ago especially if she puts pressure on her right foot.  Denies fever or chills .Admitted for podiatry evaluation and septic workup ,ID cons Palliative care consult requested ,to discuss advance directives and [facility] Final [facility]  06/23/22 1. Right hallux -Acute and chronic osteomyelitis. -Gangrenous skin and soft tissue. 2. Right first metatarsal/clean margin: -Minimal chronic osteomyelitis. Patient was on iv vancomycin with dialysis at [facility] . (14 Jul 2022 19:42)      PAST MEDICAL & SURGICAL HISTORY:  Diabetes mellitus II    HTN (hypertension)    h/o Anxiety attack    Depression    h/o Myocardial infarct 2007    CAD (coronary artery disease)    h/o Hepatitis A 1969  currently resolved, no symptoms    PAD (peripheral artery disease)    Murmur, cardiac    h/o Smoking  quitted 3/2012    CRF (chronic renal failure), unspecified stage    Dialysis patient    Anemia secondary to renal failure    HTN (hypertension)    Osteomyelitis    ESRD on dialysis    Falls    Ataxia    coronary stent 2007    s/p Ovarian cyst removal    s/p surgical removal of benign Skin lesion epigastric area    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      FAMILY HISTORY:    Social History:    MEDICATIONS  (STANDING):  aspirin enteric coated 81 milliGRAM(s) Oral daily  atorvastatin 40 milliGRAM(s) Oral at bedtime  calcium acetate 667 milliGRAM(s) Oral three times a day with meals  cloNIDine 0.1 milliGRAM(s) Oral two times a day  dextrose 5%. 1000 milliLiter(s) (50 mL/Hr) IV Continuous <Continuous>  dextrose 5%. 1000 milliLiter(s) (100 mL/Hr) IV Continuous <Continuous>  dextrose 50% Injectable 25 Gram(s) IV Push once  dextrose 50% Injectable 12.5 Gram(s) IV Push once  dextrose 50% Injectable 25 Gram(s) IV Push once  epoetin fawad-epbx (RETACRIT) Injectable 05096 Unit(s) IV Push <User Schedule>  glucagon  Injectable 1 milliGRAM(s) IntraMuscular once  heparin   Injectable 5000 Unit(s) SubCutaneous every 8 hours  imipramine 50 milliGRAM(s) Oral daily  insulin lispro (ADMELOG) corrective regimen sliding scale   SubCutaneous three times a day before meals  insulin lispro (ADMELOG) corrective regimen sliding scale   SubCutaneous at bedtime  lactobacillus acidophilus 1 Tablet(s) Oral two times a day  metoprolol tartrate 100 milliGRAM(s) Oral every 12 hours  Nephro-elvie 1 Tablet(s) Oral daily  pantoprazole    Tablet 40 milliGRAM(s) Oral before breakfast  piperacillin/tazobactam IVPB.. 3.375 Gram(s) IV Intermittent every 12 hours  risperiDONE   Tablet 0.5 milliGRAM(s) Oral two times a day  senna 2 Tablet(s) Oral at bedtime  zinc sulfate 220 milliGRAM(s) Oral daily    MEDICATIONS  (PRN):  acetaminophen     Tablet .. 650 milliGRAM(s) Oral every 6 hours PRN Temp greater or equal to 38C (100.4F), Mild Pain (1 - 3)  aluminum hydroxide/magnesium hydroxide/simethicone Suspension 30 milliLiter(s) Oral every 4 hours PRN Dyspepsia  bisacodyl Suppository 10 milliGRAM(s) Rectal daily PRN Constipation  dextrose Oral Gel 15 Gram(s) Oral once PRN Blood Glucose LESS THAN 70 milliGRAM(s)/deciliter  melatonin 3 milliGRAM(s) Oral at bedtime PRN Insomnia  ondansetron Injectable 4 milliGRAM(s) IV Push every 8 hours PRN Nausea and/or Vomiting  traMADol 50 milliGRAM(s) Oral three times a day PRN Moderate Pain (4 - 6)        T(C): 37.1 (07-18-22 @ 05:16), Max: 37.1 (07-18-22 @ 05:16)  HR: 78 (07-18-22 @ 05:16) (68 - 83)  BP: 136/71 (07-18-22 @ 05:16) (126/66 - 147/69)  RR: 18 (07-18-22 @ 05:16) (18 - 18)  SpO2: 98% (07-18-22 @ 05:16) (96% - 98%)  Wt(kg): --    PHYSICAL EXAM:  GENERAL: NAD, awake and alert  HEAD:  Atraumatic, Normocephalic  CHEST/LUNG: No rales, rhonchi, wheezing, or rubs  HEART: Regular rate and rhythm; No murmurs, rubs, or gallops  ABDOMEN: Soft, Nontender, Nondistended; Bowel sounds present  EXTREMITIES: No clubbing, cyanosis, or edema. bandage in place on right foot  SKIN: No rashes or lesions    CAPILLARY BLOOD GLUCOSE    POCT Blood Glucose.: 357 mg/dL (18 Jul 2022 07:39)  POCT Blood Glucose.: 378 mg/dL (17 Jul 2022 20:54)  POCT Blood Glucose.: 277 mg/dL (17 Jul 2022 17:29)    Complete Blood Count + Automated Diff (07.15.22 @ 06:12)   WBC Count: 8.51 K/uL   RBC Count: 3.11 M/uL   Hemoglobin: 9.0 g/dL   Hematocrit: 29.4 %   Mean Cell Volume: 94.5 fl   Mean Cell Hemoglobin: 28.9 pg   Mean Cell Hemoglobin Conc: 30.6 gm/dL   Red Cell Distrib Width: 18.5 %   Platelet Count - Automated: 395 K/uL   Auto Neutrophil #: 5.15 K/uL   Auto Lymphocyte #: 1.45 K/uL   Auto Monocyte #: 1.34 K/uL   Auto Eosinophil #: 0.34 K/uL   Auto Basophil #: 0.12 K/uL   Auto Neutrophil %: 60.6: Differential percentages must be correlated with absolute numbers for   clinical significance. %   Auto Lymphocyte %: 17.0 %   Auto Monocyte %: 15.7 %   Auto Eosinophil %: 4.0 %   Auto Basophil %: 1.4 %   Auto Immature Granulocyte %: 1.3: (Includes meta, myelo and promyelocytes) %   Nucleated RBC: 0 /100 WBCs   Comprehensive Metabolic Panel (07.15.22 @ 06:12)   Sodium, Serum: 139 mmol/L   Potassium, Serum: 4.4 mmol/L   Chloride, Serum: 100 mmol/L   Carbon Dioxide, Serum: 32 mmol/L   Anion Gap, Serum: 7 mmol/L   Blood Urea Nitrogen, Serum: 47 mg/dL   Creatinine, Serum: 5.80 mg/dL   Glucose, Serum: 145 mg/dL   Calcium, Total Serum: 9.5 mg/dL   Protein Total, Serum: 6.1 g/dL   Albumin, Serum: 2.3 g/dL   Bilirubin Total, Serum: 0.3 mg/dL   Alkaline Phosphatase, Serum: 109 U/L   Aspartate Aminotransferase (AST/SGOT): 20 U/L   Alanine Aminotransferase (ALT/SGPT): 14 U/L   eGFR: 7: The estimated glomerular filtration rate (eGFR) is calculated using the 2021 CKD-EPI creatinine equation, which does not have a coefficient for race and is validated in individuals 18 years of age and older (N Engl J Med 2021; 385:5014-6036). Creatinine-based eGFR may be inaccurate in various situations including but not limited to extremes of muscle mass, altered dietary protein intake, or medications that affect renal tubular creatinine secretion. mL/min/1.73m2     Diabetes Tests:   Hemoglobin A1C (7/17/22): 9.0      Radiology:  < from: Xray Foot AP + Lateral + Oblique, Right (07.14.22 @ 17:02) >  ACC: [id number] EXAM:  XR FOOT COMP MIN 3 VIEWS RT                        ACC: [id number] EXAM:  XR CHEST AP OR PA 1V                          PROCEDURE DATE:  07/14/2022    INTERPRETATION:  Chest and right foot. Patient has a wound around the great toe.    AP semierect chest on July 14, 2022 at 4:52 PM.    Heart magnified by technique. On June 30 there were mild lower lung field effusions. Presently lungs are clear.    Right foot. 3 views.    Indication of the distal first metatarsalagain noted. There appears to be some soft tissue ulcer of the distal soft tissues increased from June 23. Arterial calcifications are noted.    IMPRESSION: Soft tissue ulcer at the amputation site around the right first metatarsal. Clear lungs at this time.       Patient is a 71y old  Female who presents with a chief complaint of R foot wound infection (18 Jul 2022 08:14).  She was examined at the bedside. She states that her right foot still hurts, but otherwise has no complaints.   She denies headache, dizziness, fever, chills, SOB, chest pain, abd pain, N/V, dysuria, hematuria, leg swelling.  Her outpatient Insulin regimen is Admelog Solostar before meals (unknown # units) and insulin glargine 12 units at bedtime.  The patient is unsure who her endocrinologist is.  She states she previously was on metformin, with no side effects.  She was diagnosed with type 2 diabetes about 20 years ago.    Reason For Consult: uncontrolled DMT2    HPI:  71 year old female with PMH of Afib (not on AC for hx of multiple falls), T2DM, HTN, HLD, ESRD on HD (MWF), CHF, CAD s/p CABG, PAD S/P R-->L bifem-fem BK pop bypass, recent fempop bypass (6/21/2022), and partial ray amputation right great toe (6/22/2022) presented today from [facility] for wound checkup.  Patient states she noticed something wrong with her surgery site a week after since she has started putting weight on it.  She was recently discharged from  in 7/7 to [facility].  c/o pain 2 weeks ago especially if she puts pressure on her right foot.  Denies fever or chills .Admitted for podiatry evaluation and septic workup ,ID cons Palliative care consult requested ,to discuss advance directives and complete [facility] Pathology Final Diagnosis  06/23/22 1. Right hallux -Acute and chronic osteomyelitis. -Gangrenous skin and soft tissue. 2. Right first metatarsal/clean margin: -Minimal chronic osteomyelitis. Patient was on iv vancomycin with dialysis at [facility] . (14 Jul 2022 19:42)      PAST MEDICAL & SURGICAL HISTORY:  Diabetes mellitus II    HTN (hypertension)    h/o Anxiety attack    Depression    h/o Myocardial infarct 2007    CAD (coronary artery disease)    h/o Hepatitis A 1969  currently resolved, no symptoms    PAD (peripheral artery disease)    Murmur, cardiac    h/o Smoking  quitted 3/2012    CRF (chronic renal failure), unspecified stage    Dialysis patient    Anemia secondary to renal failure    HTN (hypertension)    Osteomyelitis    ESRD on dialysis    Falls    Ataxia    coronary stent 2007    s/p Ovarian cyst removal    s/p surgical removal of benign Skin lesion epigastric area    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      FAMILY HISTORY:    Social History:    MEDICATIONS  (STANDING):  aspirin enteric coated 81 milliGRAM(s) Oral daily  atorvastatin 40 milliGRAM(s) Oral at bedtime  calcium acetate 667 milliGRAM(s) Oral three times a day with meals  cloNIDine 0.1 milliGRAM(s) Oral two times a day  dextrose 5%. 1000 milliLiter(s) (50 mL/Hr) IV Continuous <Continuous>  dextrose 5%. 1000 milliLiter(s) (100 mL/Hr) IV Continuous <Continuous>  dextrose 50% Injectable 25 Gram(s) IV Push once  dextrose 50% Injectable 12.5 Gram(s) IV Push once  dextrose 50% Injectable 25 Gram(s) IV Push once  epoetin fawad-epbx (RETACRIT) Injectable 30490 Unit(s) IV Push <User Schedule>  glucagon  Injectable 1 milliGRAM(s) IntraMuscular once  heparin   Injectable 5000 Unit(s) SubCutaneous every 8 hours  imipramine 50 milliGRAM(s) Oral daily  insulin lispro (ADMELOG) corrective regimen sliding scale   SubCutaneous three times a day before meals  insulin lispro (ADMELOG) corrective regimen sliding scale   SubCutaneous at bedtime  lactobacillus acidophilus 1 Tablet(s) Oral two times a day  metoprolol tartrate 100 milliGRAM(s) Oral every 12 hours  Nephro-elvie 1 Tablet(s) Oral daily  pantoprazole    Tablet 40 milliGRAM(s) Oral before breakfast  piperacillin/tazobactam IVPB.. 3.375 Gram(s) IV Intermittent every 12 hours  risperiDONE   Tablet 0.5 milliGRAM(s) Oral two times a day  senna 2 Tablet(s) Oral at bedtime  zinc sulfate 220 milliGRAM(s) Oral daily    MEDICATIONS  (PRN):  acetaminophen     Tablet .. 650 milliGRAM(s) Oral every 6 hours PRN Temp greater or equal to 38C (100.4F), Mild Pain (1 - 3)  aluminum hydroxide/magnesium hydroxide/simethicone Suspension 30 milliLiter(s) Oral every 4 hours PRN Dyspepsia  bisacodyl Suppository 10 milliGRAM(s) Rectal daily PRN Constipation  dextrose Oral Gel 15 Gram(s) Oral once PRN Blood Glucose LESS THAN 70 milliGRAM(s)/deciliter  melatonin 3 milliGRAM(s) Oral at bedtime PRN Insomnia  ondansetron Injectable 4 milliGRAM(s) IV Push every 8 hours PRN Nausea and/or Vomiting  traMADol 50 milliGRAM(s) Oral three times a day PRN Moderate Pain (4 - 6)        T(C): 37.1 (07-18-22 @ 05:16), Max: 37.1 (07-18-22 @ 05:16)  HR: 78 (07-18-22 @ 05:16) (68 - 83)  BP: 136/71 (07-18-22 @ 05:16) (126/66 - 147/69)  RR: 18 (07-18-22 @ 05:16) (18 - 18)  SpO2: 98% (07-18-22 @ 05:16) (96% - 98%)  Wt(kg): --    PHYSICAL EXAM:  GENERAL: NAD, awake and alert  HEAD:  Atraumatic, Normocephalic  CHEST/LUNG: No rales, rhonchi, wheezing, or rubs  HEART: Regular rate and rhythm; No murmurs, rubs, or gallops  ABDOMEN: Soft, Nontender, Nondistended; Bowel sounds present  EXTREMITIES: No clubbing, cyanosis, or edema. bandage in place on right foot  SKIN: No rashes or lesions    CAPILLARY BLOOD GLUCOSE    POCT Blood Glucose.: 357 mg/dL (18 Jul 2022 07:39)  POCT Blood Glucose.: 378 mg/dL (17 Jul 2022 20:54)  POCT Blood Glucose.: 277 mg/dL (17 Jul 2022 17:29)    Complete Blood Count + Automated Diff (07.15.22 @ 06:12)   WBC Count: 8.51 K/uL   RBC Count: 3.11 M/uL   Hemoglobin: 9.0 g/dL   Hematocrit: 29.4 %   Mean Cell Volume: 94.5 fl   Mean Cell Hemoglobin: 28.9 pg   Mean Cell Hemoglobin Conc: 30.6 gm/dL   Red Cell Distrib Width: 18.5 %   Platelet Count - Automated: 395 K/uL   Auto Neutrophil #: 5.15 K/uL   Auto Lymphocyte #: 1.45 K/uL   Auto Monocyte #: 1.34 K/uL   Auto Eosinophil #: 0.34 K/uL   Auto Basophil #: 0.12 K/uL   Auto Neutrophil %: 60.6: Differential percentages must be correlated with absolute numbers for   clinical significance. %   Auto Lymphocyte %: 17.0 %   Auto Monocyte %: 15.7 %   Auto Eosinophil %: 4.0 %   Auto Basophil %: 1.4 %   Auto Immature Granulocyte %: 1.3: (Includes meta, myelo and promyelocytes) %   Nucleated RBC: 0 /100 WBCs   Comprehensive Metabolic Panel (07.15.22 @ 06:12)   Sodium, Serum: 139 mmol/L   Potassium, Serum: 4.4 mmol/L   Chloride, Serum: 100 mmol/L   Carbon Dioxide, Serum: 32 mmol/L   Anion Gap, Serum: 7 mmol/L   Blood Urea Nitrogen, Serum: 47 mg/dL   Creatinine, Serum: 5.80 mg/dL   Glucose, Serum: 145 mg/dL   Calcium, Total Serum: 9.5 mg/dL   Protein Total, Serum: 6.1 g/dL   Albumin, Serum: 2.3 g/dL   Bilirubin Total, Serum: 0.3 mg/dL   Alkaline Phosphatase, Serum: 109 U/L   Aspartate Aminotransferase (AST/SGOT): 20 U/L   Alanine Aminotransferase (ALT/SGPT): 14 U/L   eGFR: 7: The estimated glomerular filtration rate (eGFR) is calculated using the 2021 CKD-EPI creatinine equation, which does not have a coefficient for race and is validated in individuals 18 years of age and older (N Engl J Med 2021; 385:7597-3954). Creatinine-based eGFR may be inaccurate in various situations including but not limited to extremes of muscle mass, altered dietary protein intake, or medications that affect renal tubular creatinine secretion. mL/min/1.73m2     Diabetes Tests:   Hemoglobin A1C (7/17/22): 9.0      Radiology:  < from: Xray Foot AP + Lateral + Oblique, Right (07.14.22 @ 17:02) >  ACC: [id number] EXAM:  XR FOOT COMP MIN 3 VIEWS RT                        ACC: [id number] EXAM:  XR CHEST AP OR PA 1V                          PROCEDURE DATE:  07/14/2022    INTERPRETATION:  Chest and right foot. Patient has a wound around the great toe.    AP semierect chest on July 14, 2022 at 4:52 PM.    Heart magnified by technique. On June 30 there were mild lower lung field effusions. Presently lungs are clear.    Right foot. 3 views.    Indication of the distal first metatarsalagain noted. There appears to be some soft tissue ulcer of the distal soft tissues increased from June 23. Arterial calcifications are noted.    IMPRESSION: Soft tissue ulcer at the amputation site around the right first metatarsal. Clear lungs at this time.

## 2022-07-19 ENCOUNTER — TRANSCRIPTION ENCOUNTER (OUTPATIENT)
Age: 72
End: 2022-07-19

## 2022-07-19 VITALS
OXYGEN SATURATION: 98 % | TEMPERATURE: 99 F | DIASTOLIC BLOOD PRESSURE: 55 MMHG | RESPIRATION RATE: 18 BRPM | HEART RATE: 78 BPM | SYSTOLIC BLOOD PRESSURE: 142 MMHG

## 2022-07-19 DIAGNOSIS — L97.409 NON-PRESSURE CHRONIC ULCER OF UNSPECIFIED HEEL AND MIDFOOT WITH UNSPECIFIED SEVERITY: ICD-10-CM

## 2022-07-19 LAB
-  AMIKACIN: SIGNIFICANT CHANGE UP
-  AMOXICILLIN/CLAVULANIC ACID: SIGNIFICANT CHANGE UP
-  AMPICILLIN/SULBACTAM: SIGNIFICANT CHANGE UP
-  AMPICILLIN: SIGNIFICANT CHANGE UP
-  AZTREONAM: SIGNIFICANT CHANGE UP
-  CEFAZOLIN: SIGNIFICANT CHANGE UP
-  CEFEPIME: SIGNIFICANT CHANGE UP
-  CEFOXITIN: SIGNIFICANT CHANGE UP
-  CEFTAZIDIME/AVIBACTAM: SIGNIFICANT CHANGE UP
-  CEFTOLOZANE/TAZOBACTAM: SIGNIFICANT CHANGE UP
-  CEFTRIAXONE: SIGNIFICANT CHANGE UP
-  CIPROFLOXACIN: SIGNIFICANT CHANGE UP
-  ERTAPENEM: SIGNIFICANT CHANGE UP
-  GENTAMICIN: SIGNIFICANT CHANGE UP
-  IMIPENEM: SIGNIFICANT CHANGE UP
-  LEVOFLOXACIN: SIGNIFICANT CHANGE UP
-  LEVOFLOXACIN: SIGNIFICANT CHANGE UP
-  MEROPENEM: SIGNIFICANT CHANGE UP
-  PIPERACILLIN/TAZOBACTAM: SIGNIFICANT CHANGE UP
-  TOBRAMYCIN: SIGNIFICANT CHANGE UP
-  TRIMETHOPRIM/SULFAMETHOXAZOLE: SIGNIFICANT CHANGE UP
-  TRIMETHOPRIM/SULFAMETHOXAZOLE: SIGNIFICANT CHANGE UP
C PEPTIDE SERPL-MCNC: 6.3 NG/ML — HIGH (ref 1.1–4.4)
METHOD TYPE: SIGNIFICANT CHANGE UP
METHOD TYPE: SIGNIFICANT CHANGE UP

## 2022-07-19 PROCEDURE — 92610 EVALUATE SWALLOWING FUNCTION: CPT

## 2022-07-19 PROCEDURE — 85730 THROMBOPLASTIN TIME PARTIAL: CPT

## 2022-07-19 PROCEDURE — 97161 PT EVAL LOW COMPLEX 20 MIN: CPT

## 2022-07-19 PROCEDURE — 71045 X-RAY EXAM CHEST 1 VIEW: CPT

## 2022-07-19 PROCEDURE — 87070 CULTURE OTHR SPECIMN AEROBIC: CPT

## 2022-07-19 PROCEDURE — 80061 LIPID PANEL: CPT

## 2022-07-19 PROCEDURE — 86341 ISLET CELL ANTIBODY: CPT

## 2022-07-19 PROCEDURE — 80053 COMPREHEN METABOLIC PANEL: CPT

## 2022-07-19 PROCEDURE — 85610 PROTHROMBIN TIME: CPT

## 2022-07-19 PROCEDURE — 86901 BLOOD TYPING SEROLOGIC RH(D): CPT

## 2022-07-19 PROCEDURE — 86850 RBC ANTIBODY SCREEN: CPT

## 2022-07-19 PROCEDURE — 80048 BASIC METABOLIC PNL TOTAL CA: CPT

## 2022-07-19 PROCEDURE — 87077 CULTURE AEROBIC IDENTIFY: CPT

## 2022-07-19 PROCEDURE — 73630 X-RAY EXAM OF FOOT: CPT

## 2022-07-19 PROCEDURE — 86900 BLOOD TYPING SEROLOGIC ABO: CPT

## 2022-07-19 PROCEDURE — 85025 COMPLETE CBC W/AUTO DIFF WBC: CPT

## 2022-07-19 PROCEDURE — 36415 COLL VENOUS BLD VENIPUNCTURE: CPT

## 2022-07-19 PROCEDURE — 99285 EMERGENCY DEPT VISIT HI MDM: CPT

## 2022-07-19 PROCEDURE — 97162 PT EVAL MOD COMPLEX 30 MIN: CPT

## 2022-07-19 PROCEDURE — 84681 ASSAY OF C-PEPTIDE: CPT

## 2022-07-19 PROCEDURE — U0005: CPT

## 2022-07-19 PROCEDURE — U0003: CPT

## 2022-07-19 PROCEDURE — 99233 SBSQ HOSP IP/OBS HIGH 50: CPT

## 2022-07-19 PROCEDURE — 85027 COMPLETE CBC AUTOMATED: CPT

## 2022-07-19 PROCEDURE — 84443 ASSAY THYROID STIM HORMONE: CPT

## 2022-07-19 PROCEDURE — 82962 GLUCOSE BLOOD TEST: CPT

## 2022-07-19 PROCEDURE — 84439 ASSAY OF FREE THYROXINE: CPT

## 2022-07-19 PROCEDURE — 93005 ELECTROCARDIOGRAM TRACING: CPT

## 2022-07-19 PROCEDURE — 83605 ASSAY OF LACTIC ACID: CPT

## 2022-07-19 PROCEDURE — 86140 C-REACTIVE PROTEIN: CPT

## 2022-07-19 PROCEDURE — 99261: CPT

## 2022-07-19 PROCEDURE — 87205 SMEAR GRAM STAIN: CPT

## 2022-07-19 PROCEDURE — 87186 SC STD MICRODIL/AGAR DIL: CPT

## 2022-07-19 PROCEDURE — 87635 SARS-COV-2 COVID-19 AMP PRB: CPT

## 2022-07-19 PROCEDURE — 87040 BLOOD CULTURE FOR BACTERIA: CPT

## 2022-07-19 PROCEDURE — 85652 RBC SED RATE AUTOMATED: CPT

## 2022-07-19 PROCEDURE — 83735 ASSAY OF MAGNESIUM: CPT

## 2022-07-19 RX ORDER — INSULIN LISPRO 100/ML
0 VIAL (ML) SUBCUTANEOUS
Qty: 0 | Refills: 0 | DISCHARGE
Start: 2022-07-19

## 2022-07-19 RX ORDER — SENNA PLUS 8.6 MG/1
2 TABLET ORAL
Qty: 0 | Refills: 0 | DISCHARGE
Start: 2022-07-19

## 2022-07-19 RX ORDER — CIPROFLOXACIN LACTATE 400MG/40ML
1 VIAL (ML) INTRAVENOUS
Qty: 0 | Refills: 0 | DISCHARGE
Start: 2022-07-19

## 2022-07-19 RX ORDER — HEPARIN SODIUM 5000 [USP'U]/ML
5000 INJECTION INTRAVENOUS; SUBCUTANEOUS
Qty: 0 | Refills: 0 | DISCHARGE
Start: 2022-07-19

## 2022-07-19 RX ORDER — INSULIN LISPRO 100/ML
0 VIAL (ML) SUBCUTANEOUS
Qty: 0 | Refills: 0 | DISCHARGE

## 2022-07-19 RX ORDER — TRAMADOL HYDROCHLORIDE 50 MG/1
1 TABLET ORAL
Qty: 0 | Refills: 0 | DISCHARGE
Start: 2022-07-19

## 2022-07-19 RX ORDER — ACETAMINOPHEN 500 MG
2 TABLET ORAL
Qty: 0 | Refills: 0 | DISCHARGE
Start: 2022-07-19

## 2022-07-19 RX ORDER — LACTOBACILLUS ACIDOPHILUS 100MM CELL
2 CAPSULE ORAL
Qty: 0 | Refills: 0 | DISCHARGE
Start: 2022-07-19

## 2022-07-19 RX ADMIN — Medication 667 MILLIGRAM(S): at 11:48

## 2022-07-19 RX ADMIN — Medication 2: at 08:46

## 2022-07-19 RX ADMIN — Medication 1: at 17:29

## 2022-07-19 RX ADMIN — Medication 2 UNIT(S): at 17:30

## 2022-07-19 RX ADMIN — Medication 3: at 11:45

## 2022-07-19 RX ADMIN — Medication 2 UNIT(S): at 08:47

## 2022-07-19 RX ADMIN — HEPARIN SODIUM 5000 UNIT(S): 5000 INJECTION INTRAVENOUS; SUBCUTANEOUS at 06:08

## 2022-07-19 RX ADMIN — Medication 500 MILLIGRAM(S): at 11:47

## 2022-07-19 RX ADMIN — Medication 100 MILLIGRAM(S): at 06:08

## 2022-07-19 RX ADMIN — Medication 1 TABLET(S): at 11:46

## 2022-07-19 RX ADMIN — Medication 1 TABLET(S): at 06:08

## 2022-07-19 RX ADMIN — HEPARIN SODIUM 5000 UNIT(S): 5000 INJECTION INTRAVENOUS; SUBCUTANEOUS at 12:52

## 2022-07-19 RX ADMIN — Medication 667 MILLIGRAM(S): at 08:59

## 2022-07-19 RX ADMIN — RISPERIDONE 0.5 MILLIGRAM(S): 4 TABLET ORAL at 06:08

## 2022-07-19 RX ADMIN — Medication 0.1 MILLIGRAM(S): at 06:08

## 2022-07-19 RX ADMIN — PANTOPRAZOLE SODIUM 40 MILLIGRAM(S): 20 TABLET, DELAYED RELEASE ORAL at 06:08

## 2022-07-19 RX ADMIN — Medication 81 MILLIGRAM(S): at 11:47

## 2022-07-19 RX ADMIN — ZINC SULFATE TAB 220 MG (50 MG ZINC EQUIVALENT) 220 MILLIGRAM(S): 220 (50 ZN) TAB at 11:47

## 2022-07-19 RX ADMIN — Medication 50 MILLIGRAM(S): at 11:48

## 2022-07-19 RX ADMIN — Medication 2 UNIT(S): at 11:45

## 2022-07-19 NOTE — PROGRESS NOTE ADULT - REASON FOR ADMISSION
R foot wound infection

## 2022-07-19 NOTE — DISCHARGE NOTE PROVIDER - HOSPITAL COURSE
71 year old female with PMH of Afib (not on AC for hx of multiple falls), T2DM, HTN, HLD, ESRD on HD (MWF), CHF, CAD s/p CABG, PAD S/P R-->L bifem-fem BK pop bypass, recent fempop bypass (6/21/2022), and partial ray amputation right great toe (6/22/2022) presented today from [facility] for wound checkup.  Patient states she noticed something wrong with her surgery site a week after since she has started putting weight on it.  She was recently discharged from  in 7/7 to [facility].  c/o pain 2 weeks ago especially if she puts pressure on her right foot.  Denies fever or chills .Admitted for podiatry evaluation and septic workup ,ID cons Palliative care consult requested ,to discuss advance directives and complete [facility] Pathology Final Diagnosis  06/23/22 1. Right hallux -Acute and chronic osteomyelitis. -Gangrenous skin and soft tissue. 2. Right first metatarsal/clean margin: -Minimal chronic osteomyelitis. Patient was on iv vancomycin with dialysis at [facility] .ESR 39 and CRP 14  MRSA PCR negative   6/21 s/p Femoral popliteal bypass with prosthetic graft  6/23 s/p R 1st toe ray amputation, Wound culture from OR with MSSA and enterococcus fecalis   Pathology showed OM in margines so need 6 weeks treatment from the start   Was on Unasyn to complete 6 weeks on 7/26.   No leukocytosis or fever on admission   Seen  by ID CONS   Recommendations:  - Will follow blood cultures   - Wound culture has been sent x 2 , will follow   - Vascular surgery and podiatry consults   - Based on old cultures will continue zosyn or unasyn until wound culture is back    Nutritional Assessment:  · Nutritional Assessment  This patient has been assessed with a concern for Malnutrition and has been determined to have a diagnosis/diagnoses of Severe protein-calorie malnutrition and Underweight (BMI < 19).    This patient is being managed with:   Diet Soft and Bite Sized-  Consistent Carbohydrate {No Snacks}  For patients receiving Renal Replacement - No Protein Restr No Conc K No Conc Phos Low Sodium (RENAL)  Supplement Feeding Modality:  Oral  Nepro Cans or Servings Per Day:  1       Frequency:  Three Times a day  Entered: Jul 18 2022 10:35AM    ·  Problem: Cellulitis.   ·  Plan: seen by podiatry - 3 x 1 cm ulceration noted to the Right foot, 1st ray surgical site wound dehiscence, fibrogranular wound bed, no probe to bone, no periwound erythema, no fluctuance, no malodor, no proximal streaking at this time. Remainder of sutures to the 1st ray intact. seen by ID -.  ·  Problem: Acute on chronic osteomyelitis.   ·  Plan: ESR 39 and CRP 14  MRSA PCR negative   6/21 s/p Femoral popliteal bypass with prosthetic graft  6/23 s/p R 1st toe ray amputation, Wound culture from OR with MSSA and enterococcus fecalis   Pathology showed OM in margines so need 6 weeks treatment from the start   Was on Unasyn to complete 6 weeks on 7/26.   No leukocytosis or fever on admission   Seen by ID CONS   Recommendations:  - Will follow blood cultures   - Wound culture has been sent x 2 , will follow   - Vascular surgery and podiatry consults   - Based on old cultures will continue zosyn or unasyn until wound culture is back.  ·  Problem: DM (diabetes mellitus).   ·  Plan: Accuchecks monitoring and insulin corrective regimen  sliding scale coverage with short acting inslulin, add longacting insulin as needed ,no concentrated sweets diet, serial labs ,HbA1C,education.  ·  Problem: HTN (hypertension).   ·  Plan: continue home medications.  ·  Problem: ESRD on hemodialysis.   ·  Problem: Prophylactic measure.   ·  Plan: Gastrointestinal stress ulcer prophylaxis and DVT prophylaxis administered.

## 2022-07-19 NOTE — ED PROVIDER NOTE - SKIN WOUND TYPE
PRESSURE ULCER(S)
[FreeTextEntry1] : Very pleasant 89 yo F with PMH of HTN, PPM, left hip fracture s/p left hemiarthroplasty on 5/14/21 with subsequent dislocation (now wheelchair bound), chronic HFpEF, severe aortic stenosis s/p BAV 5/14/2021 who presents for follow up.  \par \par The patient had a mechanical fall in May 2021 which led to a left femoral hip fracture. While in the hospital, the patient was found to have severe aortic stenosis. The patient had a BAV on 5/14/2021 followed by a left hip hemiarthroplasty on 5/15/2021. She was discharged to SNF. While there, she continued to experience left hip pain and was found to have a dislocation of the prosthesis. Per the family, she was told by the orthopedic surgeon that she would be very high risk for a reoperation to fix the hip. Since then, she remains mostly in a wheelchair and dose not walk as any weight bearing is painful. She has a HHA 24/7 at home.\par  \par She denies any chest pain, SOB, STEPHENS, orthopnea, PND, dizziness, near syncope, syncope and palpitations. She mentions no significant change in her LE edema and would like to cut her lasix dose to every other day. She takes it daily for now. She notes improvement in her swelling with leg elevation. \par \par She used to work as RN. She is well read and reads daily. \par \par \par

## 2022-07-19 NOTE — PROGRESS NOTE ADULT - SUBJECTIVE AND OBJECTIVE BOX
Patient is a 71y Female whom presented to the hospital with esrd on hd     PAST MEDICAL & SURGICAL HISTORY:  Diabetes mellitus II      HTN (hypertension)      h/o Anxiety attack      Depression      h/o Myocardial infarct 2007      CAD (coronary artery disease)      h/o Hepatitis A 1969  currently resolved, no symptoms      PAD (peripheral artery disease)      Murmur, cardiac      h/o Smoking  quitted 3/2012      CRF (chronic renal failure), unspecified stage      Dialysis patient      Anemia secondary to renal failure      HTN (hypertension)      Osteomyelitis      ESRD on dialysis      Falls      Ataxia      coronary stent 2007      s/p Ovarian cyst removal      s/p surgical removal of benign Skin lesion epigastric area      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          MEDICATIONS  (STANDING):  aspirin enteric coated 81 milliGRAM(s) Oral daily  atorvastatin 40 milliGRAM(s) Oral at bedtime  calcium acetate 667 milliGRAM(s) Oral three times a day with meals  cloNIDine 0.1 milliGRAM(s) Oral two times a day  imipramine 50 milliGRAM(s) Oral daily  lactobacillus acidophilus 1 Tablet(s) Oral two times a day  metoprolol tartrate 100 milliGRAM(s) Oral every 12 hours  Nephro-elvie 1 Tablet(s) Oral daily  pantoprazole    Tablet 40 milliGRAM(s) Oral before breakfast  risperiDONE   Tablet 0.5 milliGRAM(s) Oral two times a day  senna 2 Tablet(s) Oral at bedtime  zinc sulfate 220 milliGRAM(s) Oral daily      Allergies    latex (Unknown)  No Known Drug Allergies    Intolerances        SOCIAL HISTORY:  Denies ETOh,Smoking,     FAMILY HISTORY:      REVIEW OF SYSTEMS:    CONSTITUTIONAL: No weakness, fevers or chills  RESPIRATORY: No cough, wheezing, hemoptysis; No shortness of breath  CARDIOVASCULAR: No chest pain or palpitations  GASTROINTESTINAL: No abdominal or epigastric pain. No nausea, vomiting,                                  8.3    11.76 )-----------( 365      ( 18 Jul 2022 14:00 )             26.6       CBC Full  -  ( 18 Jul 2022 14:00 )  WBC Count : 11.76 K/uL  RBC Count : 2.84 M/uL  Hemoglobin : 8.3 g/dL  Hematocrit : 26.6 %  Platelet Count - Automated : 365 K/uL  Mean Cell Volume : 93.7 fl  Mean Cell Hemoglobin : 29.2 pg  Mean Cell Hemoglobin Concentration : 31.2 gm/dL  Auto Neutrophil # : x  Auto Lymphocyte # : x  Auto Monocyte # : x  Auto Eosinophil # : x  Auto Basophil # : x  Auto Neutrophil % : x  Auto Lymphocyte % : x  Auto Monocyte % : x  Auto Eosinophil % : x  Auto Basophil % : x      07-18    133<L>  |  95<L>  |  69<H>  ----------------------------<  389<H>  4.2   |  29  |  5.80<H>    Ca    9.5      18 Jul 2022 14:00        CAPILLARY BLOOD GLUCOSE      POCT Blood Glucose.: 169 mg/dL (19 Jul 2022 17:27)  POCT Blood Glucose.: 274 mg/dL (19 Jul 2022 11:28)  POCT Blood Glucose.: 231 mg/dL (19 Jul 2022 08:44)  POCT Blood Glucose.: 250 mg/dL (19 Jul 2022 07:36)  POCT Blood Glucose.: 284 mg/dL (18 Jul 2022 21:56)      Vital Signs Last 24 Hrs  T(C): 37.1 (19 Jul 2022 17:58), Max: 37.1 (19 Jul 2022 17:58)  T(F): 98.8 (19 Jul 2022 17:58), Max: 98.8 (19 Jul 2022 17:58)  HR: 78 (19 Jul 2022 17:58) (70 - 78)  BP: 142/55 (19 Jul 2022 17:58) (123/54 - 142/55)  BP(mean): --  RR: 18 (19 Jul 2022 17:58) (18 - 19)  SpO2: 98% (19 Jul 2022 17:58) (97% - 99%)    Parameters below as of 19 Jul 2022 17:58  Patient On (Oxygen Delivery Method): room air              PHYSICAL EXAM:    Constitutional: NAD  HEENT: conjunctive   clear   Neck:  No JVD  Respiratory: CTAB  Cardiovascular: S1 and S2  Gastrointestinal: BS+, soft, NT/ND  Extremities: right foot dressing

## 2022-07-19 NOTE — PROGRESS NOTE ADULT - PROBLEM SELECTOR PLAN 5
HD as per nephrologist

## 2022-07-19 NOTE — PROGRESS NOTE ADULT - SUBJECTIVE AND OBJECTIVE BOX
PROGRESS NOTE  Patient is a 71y old  Female who presents with a chief complaint of R foot wound infection (19 Jul 2022 13:56)  Chart and available morning labs /imaging are reviewed electronically , urgent issues addressed . More information  is being added upon completion of rounds , when more information is collected and management discussed with consultants , medical staff and social service/case management on the floor     OVERNIGHT  No new issues reported by medical staff . All above noted Patient is resting in a bed comfortably .Confused ,poor mentation .No distress noted     HPI:  71 year old female with PMH of Afib (not on AC for hx of multiple falls), T2DM, HTN, HLD, ESRD on HD (MWF), CHF, CAD s/p CABG, PAD S/P R-->L bifem-fem BK pop bypass, recent fempop bypass (6/21/2022), and partial ray amputation right great toe (6/22/2022) presented today from [facility] for wound checkup.  Patient states she noticed something wrong with her surgery site a week after since she has started putting weight on it.  She was recently discharged from  in 7/7 to [facility].  c/o pain 2 weeks ago especially if she puts pressure on her right foot.  Denies fever or chills .Admitted for podiatry evaluation and septic workup ,ID cons Palliative care consult requested ,to discuss advance directives and [facility] Final Diagnosis  06/23/22 1. Right hallux -Acute and chronic osteomyelitis. -Gangrenous skin and soft tissue. 2. Right first metatarsal/clean margin: -Minimal chronic osteomyelitis. Patient was on iv vancomycin with dialysis at [facility] . (14 Jul 2022 19:42)    PAST MEDICAL & SURGICAL HISTORY:  Diabetes mellitus II      HTN (hypertension)      h/o Anxiety attack      Depression      h/o Myocardial infarct 2007      CAD (coronary artery disease)      h/o Hepatitis A 1969  currently resolved, no symptoms      PAD (peripheral artery disease)      Murmur, cardiac      h/o Smoking  quitted 3/2012      CRF (chronic renal failure), unspecified stage      Dialysis patient      Anemia secondary to renal failure      HTN (hypertension)      Osteomyelitis      ESRD on dialysis      Falls      Ataxia      coronary stent 2007      s/p Ovarian cyst removal      s/p surgical removal of benign Skin lesion epigastric area      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          MEDICATIONS  (STANDING):  aspirin enteric coated 81 milliGRAM(s) Oral daily  atorvastatin 40 milliGRAM(s) Oral at bedtime  calcium acetate 667 milliGRAM(s) Oral three times a day with meals  ciprofloxacin     Tablet 500 milliGRAM(s) Oral daily  cloNIDine 0.1 milliGRAM(s) Oral two times a day  dextrose 5%. 1000 milliLiter(s) (50 mL/Hr) IV Continuous <Continuous>  dextrose 5%. 1000 milliLiter(s) (100 mL/Hr) IV Continuous <Continuous>  dextrose 50% Injectable 25 Gram(s) IV Push once  dextrose 50% Injectable 12.5 Gram(s) IV Push once  dextrose 50% Injectable 25 Gram(s) IV Push once  epoetin fawad-epbx (RETACRIT) Injectable 83006 Unit(s) IV Push <User Schedule>  glucagon  Injectable 1 milliGRAM(s) IntraMuscular once  heparin   Injectable 5000 Unit(s) SubCutaneous every 8 hours  imipramine 50 milliGRAM(s) Oral daily  insulin glargine Injectable (LANTUS) 10 Unit(s) SubCutaneous at bedtime  insulin lispro (ADMELOG) corrective regimen sliding scale   SubCutaneous three times a day before meals  insulin lispro (ADMELOG) corrective regimen sliding scale   SubCutaneous at bedtime  insulin lispro Injectable (ADMELOG) 2 Unit(s) SubCutaneous three times a day before meals  lactobacillus acidophilus 1 Tablet(s) Oral two times a day  metoprolol tartrate 100 milliGRAM(s) Oral every 12 hours  Nephro-elvie 1 Tablet(s) Oral daily  pantoprazole    Tablet 40 milliGRAM(s) Oral before breakfast  risperiDONE   Tablet 0.5 milliGRAM(s) Oral two times a day  senna 2 Tablet(s) Oral at bedtime  zinc sulfate 220 milliGRAM(s) Oral daily    MEDICATIONS  (PRN):  acetaminophen     Tablet .. 650 milliGRAM(s) Oral every 6 hours PRN Temp greater or equal to 38C (100.4F), Mild Pain (1 - 3)  aluminum hydroxide/magnesium hydroxide/simethicone Suspension 30 milliLiter(s) Oral every 4 hours PRN Dyspepsia  bisacodyl Suppository 10 milliGRAM(s) Rectal daily PRN Constipation  dextrose Oral Gel 15 Gram(s) Oral once PRN Blood Glucose LESS THAN 70 milliGRAM(s)/deciliter  melatonin 3 milliGRAM(s) Oral at bedtime PRN Insomnia  ondansetron Injectable 4 milliGRAM(s) IV Push every 8 hours PRN Nausea and/or Vomiting  traMADol 50 milliGRAM(s) Oral three times a day PRN Moderate Pain (4 - 6)      OBJECTIVE    T(C): 36.7 (07-19-22 @ 13:06), Max: 36.8 (07-18-22 @ 20:10)  HR: 70 (07-19-22 @ 13:06) (70 - 75)  BP: 123/54 (07-19-22 @ 13:06) (123/54 - 134/65)  RR: 19 (07-19-22 @ 13:06) (18 - 19)  SpO2: 99% (07-19-22 @ 13:06) (97% - 99%)  Wt(kg): --  I&O's Summary    18 Jul 2022 07:01  -  19 Jul 2022 07:00  --------------------------------------------------------  IN: 0 mL / OUT: 1500 mL / NET: -1500 mL          REVIEW OF SYSTEMS:  CONSTITUTIONAL: No fever, weight loss, or fatigue  EYES: No eye pain, visual disturbances, or discharge  ENMT:   No sinus or throat pain  NECK: No pain or stiffness  RESPIRATORY: No cough, wheezing, chills or hemoptysis; No shortness of breath  CARDIOVASCULAR: No chest pain, palpitations, dizziness, or leg swelling  GASTROINTESTINAL: No abdominal pain. No nausea, vomiting; No diarrhea or constipation. No melena or hematochezia.  GENITOURINARY: No dysuria, frequency, hematuria, or incontinence  NEUROLOGICAL: No headaches, memory loss, loss of strength, numbness, or tremors  SKIN: No itching, burning, rashes, or lesions   MUSCULOSKELETAL: No joint pain or swelling; No muscle, back, or extremity pain    PHYSICAL EXAM:  Appearance: NAD. VS past 24 hrs -as above   HEENT:   Moist oral mucosa. Conjunctiva clear b/l.   Neck : supple  Respiratory: Lungs CTAB.  Gastrointestinal:  Soft, nontender. No rebound. No rigidity. BS present	  Cardiovascular: RRR ,S1S2 present  Neurologic: Non-focal. Moving all extremities.  Extremities: No edema. No erythema. No calf tenderness.  Skin: No rashes, No ecchymoses, No cyanosis.	  wounds ,skin lesions-See skin assesment flow sheet   - Right foot surgical site wound dehiscence. Wound bed in fibrotic with mild amount of serosanguinous drainage. NO purulence, no erythema, no edema,  no malodor. No active signs of infection. Sutures partially still intact.  - Vascular recs appreciated: No further intervention at this time.   - Continue IV abx per ID: Will stop zosyn and start ciprofloxacin 500mg daily po, will treat for 2 weeks total and see her in wound center.      - X-ray: Post-surgical changes. No fractures nor dislocation  - No further podiatric care at this time. Patient to follow-up outpatient for wound care where her sutures will be removed and further wound care instructions will be discussed.                        8.3    11.76 )-----------( 365      ( 18 Jul 2022 14:00 )             26.6     07-18    133<L>  |  95<L>  |  69<H>  ----------------------------<  389<H>  4.2   |  29  |  5.80<H>    Ca    9.5      18 Jul 2022 14:00      CAPILLARY BLOOD GLUCOSE      POCT Blood Glucose.: 169 mg/dL (19 Jul 2022 17:27)  POCT Blood Glucose.: 274 mg/dL (19 Jul 2022 11:28)  POCT Blood Glucose.: 231 mg/dL (19 Jul 2022 08:44)  POCT Blood Glucose.: 250 mg/dL (19 Jul 2022 07:36)  POCT Blood Glucose.: 284 mg/dL (18 Jul 2022 21:56)          Culture - Abscess with Gram Stain (collected 15 Jul 2022 07:15)  Source: .Abscess R foot  Preliminary Report (19 Jul 2022 10:01):    Numerous Enterobacter cloacae    Numerous Klebsiella pneumoniae    Rare Stenotrophomonas maltophilia    Rare Bacillus species not anthracis "Susceptibilities not performed"  Organism: Enterobacter cloacae  Klebsiella pneumoniae  Stenotrophomonas maltophilia (19 Jul 2022 10:00)  Organism: Stenotrophomonas maltophilia (19 Jul 2022 10:00)  Organism: Enterobacter cloacae (19 Jul 2022 09:58)  Organism: Klebsiella pneumoniae (18 Jul 2022 10:45)    Culture - Other (collected 14 Jul 2022 20:05)  Source: .Other Other  Preliminary Report (18 Jul 2022 23:36):    Numerous Stenotrophomonas maltophilia    Moderate Enterobacter cloacae complex    Normal kera isolated  Organism: Stenotrophomonas maltophilia  Enterobacter cloacae complex (18 Jul 2022 23:36)  Organism: Stenotrophomonas maltophilia (18 Jul 2022 23:36)  Organism: Enterobacter cloacae complex (18 Jul 2022 09:56)    Culture - Blood (collected 14 Jul 2022 15:15)  Source: .Blood Blood-Peripheral  Preliminary Report (16 Jul 2022 01:02):    No growth to date.    Culture - Blood (collected 14 Jul 2022 15:10)  Source: .Blood Blood-Peripheral  Preliminary Report (16 Jul 2022 01:02):    No growth to date.      RADIOLOGY & ADDITIONAL TESTS:   reviewed elctronically  ASSESSMENT/PLAN: 	    Patient was seen and examined on a day of discharge . Plan of care , discharge medications and recommendations discussed with consultants and clearance for discharge obtained .Social service , case management  and medical staff are aware of plan. Family is notified. Discharge summary  is  prepared electronically-see separate document prepared by me .75minutes spent on this visit, 50% visit time spent in care co-ordination with other attendings and counselling patient  I have discussed care plan with patient and HCP,expressed understanding of problems treatment and their effect and side effects, alternatives in detail,I have asked if they have any questions and concerns and appropriately addressed them to best of my ability

## 2022-07-19 NOTE — PROGRESS NOTE ADULT - SUBJECTIVE AND OBJECTIVE BOX
CAPILLARY BLOOD GLUCOSE      POCT Blood Glucose.: 284 mg/dL (18 Jul 2022 21:56)  POCT Blood Glucose.: 153 mg/dL (18 Jul 2022 17:23)  POCT Blood Glucose.: 155 mg/dL (18 Jul 2022 16:35)  POCT Blood Glucose.: 359 mg/dL (18 Jul 2022 11:59)  POCT Blood Glucose.: 357 mg/dL (18 Jul 2022 07:39)      Vital Signs Last 24 Hrs  T(C): 36.7 (19 Jul 2022 05:25), Max: 36.8 (18 Jul 2022 20:10)  T(F): 98.1 (19 Jul 2022 05:25), Max: 98.2 (18 Jul 2022 20:10)  HR: 71 (19 Jul 2022 05:25) (68 - 77)  BP: 134/65 (19 Jul 2022 05:25) (117/61 - 146/57)  BP(mean): --  RR: 18 (19 Jul 2022 05:25) (15 - 18)  SpO2: 97% (19 Jul 2022 05:25) (96% - 100%)    Parameters below as of 19 Jul 2022 05:25  Patient On (Oxygen Delivery Method): room air        Respiratory: CTA B/L  CV: RRR, S1S2, no murmurs, rubs or gallops  Abdominal: Soft, NT, ND +BS, Last BM  Extremities: No edema, + peripheral pulses     07-18    133<L>  |  95<L>  |  69<H>  ----------------------------<  389<H>  4.2   |  29  |  5.80<H>    Ca    9.5      18 Jul 2022 14:00        atorvastatin 40 milliGRAM(s) Oral at bedtime  dextrose 50% Injectable 12.5 Gram(s) IV Push once  dextrose 50% Injectable 25 Gram(s) IV Push once  dextrose 50% Injectable 25 Gram(s) IV Push once  dextrose Oral Gel 15 Gram(s) Oral once PRN  glucagon  Injectable 1 milliGRAM(s) IntraMuscular once  insulin glargine Injectable (LANTUS) 10 Unit(s) SubCutaneous at bedtime  insulin lispro (ADMELOG) corrective regimen sliding scale   SubCutaneous three times a day before meals  insulin lispro (ADMELOG) corrective regimen sliding scale   SubCutaneous at bedtime  insulin lispro Injectable (ADMELOG) 2 Unit(s) SubCutaneous three times a day before meals

## 2022-07-19 NOTE — DISCHARGE NOTE NURSING/CASE MANAGEMENT/SOCIAL WORK - NSDCPEFALRISK_GEN_ALL_CORE
For information on Fall & Injury Prevention, visit: [email or web address] OR  [email or web address] OR  https://www.cdc.gov/steadi/patient.html

## 2022-07-19 NOTE — DISCHARGE NOTE PROVIDER - CARE PROVIDER_API CALL
Pahlavan, Mohsen (MD)  Medicine  [address] [facility], [address] [place] [address]  Phone: [phone]  Fax: [phone]  Follow Up Time: 1 week

## 2022-07-19 NOTE — PROGRESS NOTE ADULT - PROBLEM SELECTOR PLAN 3
continue med management
Accuchecks monitoring and insulin corrective regimen  sliding scale coverage with short acting inslulin, add longacting insulin as needed ,no concentrated sweets diet, serial labs ,HbA1C,education
continue med management
Accuchecks monitoring and insulin corrective regimen  sliding scale coverage with short acting inslulin, add longacting insulin as needed ,no concentrated sweets diet, serial labs ,HbA1C,education
continue med management
Accuchecks monitoring and insulin corrective regimen  sliding scale coverage with short acting inslulin, add longacting insulin as needed ,no concentrated sweets diet, serial labs ,HbA1C,education

## 2022-07-19 NOTE — PROGRESS NOTE ADULT - PROBLEM SELECTOR PLAN 1
Pt seen and evaluated  - Right foot surgical site wound dehiscence. Wound bed in fibrotic with mild amount of serosanguinous drainage. NO purulence, no erythema, no edema,  no malodor. No active signs of infection. Sutures partially still intact.  - Vascular recs appreciated: No further intervention at this time.   - Continue IV abx per ID: Will stop zosyn and start ciprofloxacin 500mg daily po, will treat for 2 weeks total and see her in wound center.      - X-ray: Post-surgical changes. No fractures nor dislocation  - No further podiatric care at this time. Patient to follow-up outpatient for wound care where her sutures will be removed and further wound care instructions will be discussed.     WOUND CARE INSTRUCTIONS  1. Please remove the dressing with care and cleanse the wound with saline.   2. Apply Aquacell alginate or any alginate to the wound bed with dry, sterile dressing. Change daily.   3. Patient to be partial weight bear to the right heel as tolerated with assisted device (pt has a history of falls)  4. Patient is to follow up in the [facility] with Dr. Lau or Dr. Pastor within 3-5 days upon discharge. Please call [phone] as soon as discharged and state that it is for a "post-op appointment".
cont lantus 10 units qhs  cont admelog 2 units 3x/day before meals  cont admelog low dose corrective scale coverage qac/qhs  cont cons cho diet  goal bg 100-180 in hosp setting  f/u c peptide, dustin ab
seen by podiatry - 3 x 1 cm ulceration noted to the Right foot, 1st ray surgical site wound dehiscence, fibrogranular wound bed, no probe to bone, no periwound erythema, no fluctuance, no malodor, no proximal streaking at this time. Remainder of sutures to the 1st ray intact. seen by ID -
seen by podiatry - 3 x 1 cm ulceration noted to the Right foot, 1st ray surgical site wound dehiscence, fibrogranular wound bed, no probe to bone, no periwound erythema, no fluctuance, no malodor, no proximal streaking at this time. Remainder of sutures to the 1st ray intact. seen by ID -
Pt seen and evaluated  - X-ray: Post-surgical changes. No fractures nor dislocation  - Right foot surgical site wound dehiscence. Wound bed in fibrotic. NO purulence, no erythema, no edema,  no malodor. No active signs of infection.   - Pt was recently admitted and discharge from the hospital for PAD & osteomyelitis. Pt has a history of chronic osteomyelitis to the right foot and is currently receiving IV abx during her dialysis sessions.   - No further podiatric care at this time. Patient to follow-up outpatient for wound care.     WOUND CARE INSTRUCTIONS  1. Please leave dressing clean, dry and intact until follow-up. Monitor for any signs of infection and present to the ED if they arise.  2. If the dressing gets wet, please change with dry, sterile dressing.  3. Patient to be partial weight bear to the right heel as tolerated with assisted device (pt has a history of falls)  4. Patient is to follow up in the [facility] with Dr. Lau or Dr. Pastor within 3-5 days upon discharge. Please call [phone] as soon as discharged and state that it is for a "post-op appointment".
seen by podiatry - 3 x 1 cm ulceration noted to the Right foot, 1st ray surgical site wound dehiscence, fibrogranular wound bed, no probe to bone, no periwound erythema, no fluctuance, no malodor, no proximal streaking at this time. Remainder of sutures to the 1st ray intact. seen by ID -
Pt seen and evaluated  - Right foot surgical site wound dehiscence. Wound bed in fibrotic. NO purulence, no erythema, no edema,  no malodor. No active signs of infection. Sutures partially still intact.  - Vascular recs appreciated: No further intervention at this time.   - Continue IV abx per ID: Will stop zosyn and start ciprofloxacin 500mg daily po, will treat for 2 weeks total and see her in wound center.      - X-ray: Post-surgical changes. No fractures nor dislocation  - No further podiatric care at this time. Patient to follow-up outpatient for wound care where her sutures will be removed and further wound care instructions will be discussed.     WOUND CARE INSTRUCTIONS  1. Please leave dressing clean, dry and intact until follow-up. Monitor for any signs of infection and present to the ED if they arise.  2. If the dressing gets wet, please change with dry, sterile dressing.  3. Patient to be partial weight bear to the right heel as tolerated with assisted device (pt has a history of falls)  4. Patient is to follow up in the [facility] with Dr. Lau or Dr. Pastor within 3-5 days upon discharge. Please call [phone] as soon as discharged and state that it is for a "post-op appointment".

## 2022-07-19 NOTE — PROGRESS NOTE ADULT - PROBLEM SELECTOR PROBLEM 3
ESRD on hemodialysis
DM (diabetes mellitus)
ESRD on hemodialysis
ESRD on hemodialysis
DM (diabetes mellitus)

## 2022-07-19 NOTE — PROGRESS NOTE ADULT - NUTRITIONAL ASSESSMENT
This patient has been assessed with a concern for Malnutrition and has been determined to have a diagnosis/diagnoses of Severe protein-calorie malnutrition and Underweight (BMI < 19).    This patient is being managed with:   Diet Consistent Carbohydrate Renal w/Evening Snack-  Soft and Bite Sized (SOFTBTSZ)  Mildly Thick Liquids (MILDTHICKLIQS)  Supplement Feeding Modality:  Oral  Nepro Cans or Servings Per Day:  1       Frequency:  Three Times a day  Entered: Jul 16 2022 12:37PM    
MEDICATIONS  (STANDING):  aspirin enteric coated 81 milliGRAM(s) Oral daily  atorvastatin 40 milliGRAM(s) Oral at bedtime  calcium acetate 667 milliGRAM(s) Oral three times a day with meals  cloNIDine 0.1 milliGRAM(s) Oral two times a day  epoetin fawad-epbx (RETACRIT) Injectable 08531 Unit(s) IV Push <User Schedule>  heparin   Injectable 5000 Unit(s) SubCutaneous every 8 hours  hydrogen peroxide 3% Solution 1 Application(s) Topical once  imipramine 50 milliGRAM(s) Oral daily  lactobacillus acidophilus 1 Tablet(s) Oral two times a day  metoprolol tartrate 100 milliGRAM(s) Oral every 12 hours  Nephro-elvie 1 Tablet(s) Oral daily  pantoprazole    Tablet 40 milliGRAM(s) Oral before breakfast  piperacillin/tazobactam IVPB.. 3.375 Gram(s) IV Intermittent every 12 hours  risperiDONE   Tablet 0.5 milliGRAM(s) Oral two times a day  senna 2 Tablet(s) Oral at bedtime  zinc sulfate 220 milliGRAM(s) Oral daily
MEDICATIONS  (STANDING):  aspirin enteric coated 81 milliGRAM(s) Oral daily  atorvastatin 40 milliGRAM(s) Oral at bedtime  calcium acetate 667 milliGRAM(s) Oral three times a day with meals  cloNIDine 0.1 milliGRAM(s) Oral two times a day  epoetin fawad-epbx (RETACRIT) Injectable 56434 Unit(s) IV Push <User Schedule>  heparin   Injectable 5000 Unit(s) SubCutaneous every 8 hours  hydrogen peroxide 3% Solution 1 Application(s) Topical once  imipramine 50 milliGRAM(s) Oral daily  lactobacillus acidophilus 1 Tablet(s) Oral two times a day  metoprolol tartrate 100 milliGRAM(s) Oral every 12 hours  Nephro-elvie 1 Tablet(s) Oral daily  pantoprazole    Tablet 40 milliGRAM(s) Oral before breakfast  piperacillin/tazobactam IVPB.. 3.375 Gram(s) IV Intermittent every 12 hours  risperiDONE   Tablet 0.5 milliGRAM(s) Oral two times a day  senna 2 Tablet(s) Oral at bedtime  zinc sulfate 220 milliGRAM(s) Oral daily
MEDICATIONS  (STANDING):  aspirin enteric coated 81 milliGRAM(s) Oral daily  atorvastatin 40 milliGRAM(s) Oral at bedtime  calcium acetate 667 milliGRAM(s) Oral three times a day with meals  cloNIDine 0.1 milliGRAM(s) Oral two times a day  epoetin fawad-epbx (RETACRIT) Injectable 77423 Unit(s) IV Push <User Schedule>  heparin   Injectable 5000 Unit(s) SubCutaneous every 8 hours  hydrogen peroxide 3% Solution 1 Application(s) Topical once  imipramine 50 milliGRAM(s) Oral daily  lactobacillus acidophilus 1 Tablet(s) Oral two times a day  metoprolol tartrate 100 milliGRAM(s) Oral every 12 hours  Nephro-elvie 1 Tablet(s) Oral daily  pantoprazole    Tablet 40 milliGRAM(s) Oral before breakfast  piperacillin/tazobactam IVPB.. 3.375 Gram(s) IV Intermittent every 12 hours  risperiDONE   Tablet 0.5 milliGRAM(s) Oral two times a day  senna 2 Tablet(s) Oral at bedtime  zinc sulfate 220 milliGRAM(s) Oral daily
MEDICATIONS  (STANDING):  aspirin enteric coated 81 milliGRAM(s) Oral daily  atorvastatin 40 milliGRAM(s) Oral at bedtime  calcium acetate 667 milliGRAM(s) Oral three times a day with meals  cloNIDine 0.1 milliGRAM(s) Oral two times a day  epoetin fawad-epbx (RETACRIT) Injectable 77623 Unit(s) IV Push <User Schedule>  heparin   Injectable 5000 Unit(s) SubCutaneous every 8 hours  hydrogen peroxide 3% Solution 1 Application(s) Topical once  imipramine 50 milliGRAM(s) Oral daily  lactobacillus acidophilus 1 Tablet(s) Oral two times a day  metoprolol tartrate 100 milliGRAM(s) Oral every 12 hours  Nephro-elvie 1 Tablet(s) Oral daily  pantoprazole    Tablet 40 milliGRAM(s) Oral before breakfast  piperacillin/tazobactam IVPB.. 3.375 Gram(s) IV Intermittent every 12 hours  risperiDONE   Tablet 0.5 milliGRAM(s) Oral two times a day  senna 2 Tablet(s) Oral at bedtime  zinc sulfate 220 milliGRAM(s) Oral daily
This patient has been assessed with a concern for Malnutrition and has been determined to have a diagnosis/diagnoses of Severe protein-calorie malnutrition and Underweight (BMI < 19).    This patient is being managed with:   Diet Soft and Bite Sized-  Consistent Carbohydrate {No Snacks}  For patients receiving Renal Replacement - No Protein Restr No Conc K No Conc Phos Low Sodium (RENAL)  Supplement Feeding Modality:  Oral  Nepro Cans or Servings Per Day:  1       Frequency:  Three Times a day  Entered: Jul 18 2022 10:35AM    
This patient has been assessed with a concern for Malnutrition and has been determined to have a diagnosis/diagnoses of Severe protein-calorie malnutrition and Underweight (BMI < 19).    This patient is being managed with:   Diet Soft and Bite Sized-  Consistent Carbohydrate {No Snacks}  For patients receiving Renal Replacement - No Protein Restr No Conc K No Conc Phos Low Sodium (RENAL)  Supplement Feeding Modality:  Oral  Nepro Cans or Servings Per Day:  1       Frequency:  Three Times a day  Entered: Jul 18 2022 10:35AM

## 2022-07-19 NOTE — PROGRESS NOTE ADULT - SUBJECTIVE AND OBJECTIVE BOX
[facility]  INFECTIOUS DISEASES   [address] [place] [address]  Tel: [phone]     Fax: [phone]  ======================================================  MD Zita Alejandra Kaushal, MD Cho, Michelle, MD   ======================================================    MRN-[id number]  SHILO JOSE F     Follow up: R foot wound infection     No fever or any other symptoms. No overnight event.  No pain in foot, alert and oriented sitting on bed.     PAST MEDICAL & SURGICAL HISTORY:  Diabetes mellitus II  HTN (hypertension)  h/o Anxiety attack  Depression  h/o Myocardial infarct 2007  CAD (coronary artery disease)  h/o Hepatitis A 1969  currently resolved, no symptoms  PAD (peripheral artery disease)  Murmur, cardiac  h/o Smoking  quitted 3/2012  CRF (chronic renal failure), unspecified stage  Dialysis patient  Anemia secondary to renal failure  HTN (hypertension)  coronary stent 2007  s/p Ovarian cyst removal  s/p surgical removal of benign Skin lesion epigastric area    Social Hx: no current smoking, ETOH or drugs     FAMILY HISTORY:  No pertinent family history in first degree relatives    Allergies  latex (Unknown)  No Known Drug Allergies    Antibiotics:  MEDICATIONS  (STANDING):  aspirin enteric coated 81 milliGRAM(s) Oral daily  atorvastatin 40 milliGRAM(s) Oral at bedtime  calcium acetate 667 milliGRAM(s) Oral three times a day with meals  cloNIDine 0.1 milliGRAM(s) Oral two times a day  heparin   Injectable 5000 Unit(s) SubCutaneous every 8 hours  imipramine 50 milliGRAM(s) Oral daily  lactobacillus acidophilus 1 Tablet(s) Oral two times a day  metoprolol tartrate 100 milliGRAM(s) Oral every 12 hours  Nephro-elvie 1 Tablet(s) Oral daily  pantoprazole    Tablet 40 milliGRAM(s) Oral before breakfast  piperacillin/tazobactam IVPB.. 3.375 Gram(s) IV Intermittent every 12 hours  risperiDONE   Tablet 0.5 milliGRAM(s) Oral two times a day  senna 2 Tablet(s) Oral at bedtime  zinc sulfate 220 milliGRAM(s) Oral daily     REVIEW OF SYSTEMS:  CONSTITUTIONAL:  No Fever or chills  HEENT:  No diplopia or blurred vision.  No sore throat or runny nose.  CARDIOVASCULAR:  No chest pain or SOB.  RESPIRATORY:  No cough, shortness of breath, PND or orthopnea.  GASTROINTESTINAL:  No nausea, vomiting or diarrhea.  GENITOURINARY:  No dysuria, frequency or urgency. No Blood in urine  MUSCULOSKELETAL:  no joint aches, no muscle pain  SKIN:  No change in skin, hair or nails.  NEUROLOGIC:  No paresthesias, fasciculations, seizures or weakness.  PSYCHIATRIC:  No disorder of thought or mood.  ENDOCRINE:  No heat or cold intolerance, polyuria or polydipsia.  HEMATOLOGICAL:  No easy bruising or bleeding.     Physical Exam:  Vital Signs Last 24 Hrs  T(C): 36.7 (19 Jul 2022 13:06), Max: 36.8 (18 Jul 2022 20:10)  T(F): 98.1 (19 Jul 2022 13:06), Max: 98.2 (18 Jul 2022 20:10)  HR: 70 (19 Jul 2022 13:06) (70 - 77)  BP: 123/54 (19 Jul 2022 13:06) (123/54 - 146/57)  BP(mean): --  RR: 19 (19 Jul 2022 13:06) (15 - 19)  SpO2: 99% (19 Jul 2022 13:06) (96% - 99%)  Parameters below as of 19 Jul 2022 13:06  Patient On (Oxygen Delivery Method): room air  GEN: NAD  HEENT: normocephalic and atraumatic. EOMI. PERRL.    NECK: Supple.  No lymphadenopathy   LUNGS: Clear to auscultation.  HEART: Regular rate and rhythm  ABDOMEN: Soft, nontender, and nondistended.  Positive bowel sounds.    : No CVA tenderness  EXTREMITIES: bypass wounds are healed, R 1st toe stump open with some purulent discharge, mild cellulitis   NEUROLOGIC: grossly intact.  PSYCHIATRIC: Appropriate affect .  SKIN: No rash     Labs:                        8.3    11.76 )-----------( 365      ( 18 Jul 2022 14:00 )             26.6     07-18    133<L>  |  95<L>  |  69<H>  ----------------------------<  389<H>  4.2   |  29  |  5.80<H>    Ca    9.5      18 Jul 2022 14:00    Culture - Abscess with Gram Stain (collected 07-15-22 @ 07:15)  Source: .Abscess R foot  Organism: Enterobacter cloacae  Klebsiella pneumoniae  Stenotrophomonas maltophilia (07-19-22 @ 10:00)  Organism: Stenotrophomonas maltophilia (07-19-22 @ 10:00)    Sensitivities:      -  Levofloxacin: I 4      -  Trimethoprim/Sulfamethoxazole: S 1/19      Method Type: SONIA  Organism: Enterobacter cloacae (07-19-22 @ 09:58)    Sensitivities:      -  Amikacin: S <=16      -  Amoxicillin/Clavulanic Acid: R >16/8      -  Ampicillin: R >16 These ampicillin results predict results for amoxicillin      -  Ampicillin/Sulbactam: R >16/8 Enterobacter, Klebsiella aerogenes, Citrobacter, and Serratia may develop resistance during prolonged therapy (3-4 days)      -  Aztreonam: R >16      -  Cefazolin: R >16 Enterobacter, Klebsiella aerogenes, Citrobacter, and Serratia may develop resistance during prolonged therapy (3-4 days)      -  Cefepime: R >16      -  Cefoxitin: R >16      -  Ceftazidime/Avibactam: S <=4      -  Ceftolozane/tazobactam: R >8      -  Ceftriaxone: R >32 Enterobacter, Klebsiella aerogenes, Citrobacter, and Serratia may develop resistance during prolonged therapy      -  Ciprofloxacin: S <=0.25      -  Ertapenem: I 1      -  Gentamicin: S <=2      -  Imipenem: S <=1      -  Levofloxacin: S <=0.5      -  Meropenem: S <=1      -  Piperacillin/Tazobactam: R >64      -  Tobramycin: S <=2      -  Trimethoprim/Sulfamethoxazole: S 1/19      Method Type: SONIA  Organism: Klebsiella pneumoniae (07-18-22 @ 10:45)    Sensitivities:      -  Amikacin: S <=16      -  Amoxicillin/Clavulanic Acid: S <=8/4      -  Ampicillin: R >16 These ampicillin results predict results for amoxicillin      -  Ampicillin/Sulbactam: S <=4/2 Enterobacter, Klebsiella aerogenes, Citrobacter, and Serratia may develop resistance during prolonged therapy (3-4 days)      -  Aztreonam: S <=4      -  Cefazolin: S <=2 Enterobacter, Klebsiella aerogenes, Citrobacter, and Serratia may develop resistance during prolonged therapy (3-4 days)      -  Cefepime: S <=2      -  Cefoxitin: S <=8      -  Ceftriaxone: S <=1 Enterobacter, Klebsiella aerogenes, Citrobacter, and Serratia may develop resistance during prolonged therapy      -  Ciprofloxacin: S <=0.25      -  Ertapenem: S <=0.5      -  Gentamicin: S <=2      -  Imipenem: S <=1      -  Levofloxacin: S <=0.5      -  Meropenem: S <=1      -  Piperacillin/Tazobactam: S <=8      -  Tobramycin: S <=2      -  Trimethoprim/Sulfamethoxazole: S <=0.5/9.5      Method Type: SONIA    Culture - Other (collected 07-14-22 @ 20:05)  Source: .Other Other  Organism: Stenotrophomonas maltophilia  Enterobacter cloacae complex (07-18-22 @ 23:36)  Organism: Stenotrophomonas maltophilia (07-18-22 @ 23:36)    Sensitivities:      -  Ceftazidime: S 4      -  Levofloxacin: S <=0.5      -  Trimethoprim/Sulfamethoxazole: S <=0.5/9.5      Method Type: SONIA  Organism: Enterobacter cloacae complex (07-18-22 @ 09:56)    Sensitivities:      -  Amikacin: S <=16      -  Amoxicillin/Clavulanic Acid: R >16/8      -  Ampicillin: R >16 These ampicillin results predict results for amoxicillin      -  Ampicillin/Sulbactam: R >16/8 Enterobacter, Klebsiella aerogenes, Citrobacter, and Serratia may develop resistance during prolonged therapy (3-4 days)      -  Aztreonam: R >16      -  Cefazolin: R >16 Enterobacter, Klebsiella aerogenes, Citrobacter, and Serratia may develop resistance during prolonged therapy (3-4 days)      -  Cefepime: S 8      -  Cefoxitin: R >16      -  Ceftriaxone: R >32 Enterobacter, Klebsiella aerogenes, Citrobacter, and Serratia may develop resistance during prolonged therapy      -  Ciprofloxacin: S <=0.25      -  Ertapenem: S <=0.5      -  Gentamicin: S <=2      -  Imipenem: S <=1      -  Levofloxacin: S <=0.5      -  Meropenem: S <=1      -  Piperacillin/Tazobactam: R >64      -  Tobramycin: S <=2      -  Trimethoprim/Sulfamethoxazole: S <=0.5/9.5      Method Type: SONIA    Culture - Blood (collected 07-14-22 @ 15:15)  Source: .Blood Blood-Peripheral    Culture - Blood (collected 07-14-22 @ 15:10)  Source: .Blood Blood-Peripheral    WBC Count: 11.76 K/uL (07-18-22 @ 14:00)  WBC Count: 8.51 K/uL (07-15-22 @ 06:12)  WBC Count: 9.08 K/uL (07-14-22 @ 15:15)    Creatinine, Serum: 5.80 mg/dL (07-18-22 @ 14:00)  Creatinine, Serum: 5.80 mg/dL (07-15-22 @ 06:12)  Creatinine, Serum: 5.20 mg/dL (07-14-22 @ 15:15)    C-Reactive Protein, Serum: 14 mg/L (07-14-22 @ 15:15)    Sedimentation Rate, Erythrocyte: 39 mm/hr (07-14-22 @ 15:15)    COVID-19 PCR: NotDetec (07-18-22 @ 09:30)  COVID-19 PCR: NotDetec (07-14-22 @ 15:15)  COVID-19 PCR: NotDetec (07-06-22 @ 07:15)  COVID-19 PCR: NotDetec (07-02-22 @ 11:50)  COVID-19 PCR: NotDetec (06-28-22 @ 14:00)  COVID-19 PCR: NotDetec (06-23-22 @ 11:42)  COVID-19 PCR: NotDetec (06-20-22 @ 08:14)    All imaging and other data have been reviewed.  < from: Xray Foot AP + Lateral + Oblique, Right (07.14.22 @ 17:02) >  IMPRESSION: Soft tissue ulcer at the amputation site around the right   first metatarsal. Clear lungs at this time.    Assessment and Plan:   70 yo woman with PMH of HTN, DM2, ESRD on HD, Afib, CHF, CAD s/p CABG, PAD S/P R-->L bifem-fem/Pop bypass, recent R fem/pop bypass on 6/21/2022 and partial ray amputation right great toe 6/23/2022   was admitted from [facility] for wound dehiscence and infection.   ESR 39 and CRP 14  MRSA PCR negative   6/21 s/p Femoral popliteal bypass with prosthetic graft  6/23 s/p R 1st toe ray amputation, Wound culture from OR with MSSA and enterococcus fecalis   Pathology showed OM in margines so need 6 weeks treatment from the start   Was on Unasyn to complete 6 weeks on 7/26   Now growing Klebsiella and enterobacter and stenotrophomonas, not clear if colonization or real infection, wound is open for a while. Most likely needs wound vac.     Recommendations:  - Blood cultures NGTD  - Wound culture with Klebsiella, enterobacter, and also Stenotrophomonas but unclear if real infection   - Vascular surgery and podiatry follow up  - Continue ciprofloxacin 500mg daily po, will treat for 2 weeks total and see her in wound center, if no improvement, will try IV or adding other antibiotics     Will follow PRN.    Geovany Long MD  Division of Infectious Diseases   Please call ID service at [phone] with any question.      35 minutes spent on total encounter assessing patient, examination, chart reivew, counseling and coordinating care by the attending physician/nurse/care manager.

## 2022-07-19 NOTE — DISCHARGE NOTE PROVIDER - NSDCMRMEDTOKEN_GEN_ALL_CORE_FT
acetaminophen 325 mg oral tablet: 2 tab(s) orally every 6 hours, As needed, Temp greater or equal to 38C (100.4F), Mild Pain (1 - 3)  aspirin 81 mg oral delayed release tablet: 1 tab(s) orally once a day  atorvastatin 40 mg oral tablet: 1 tab(s) orally once a day (at bedtime)  bisacodyl 10 mg rectal suppository: 1 suppository(ies) rectal once a day, As needed, Constipation  calcium acetate 667 mg oral tablet: 1 tab(s) orally 3 times a day  Cavilon Emollient topical cream: Apply topically to affected area once a day  ciprofloxacin 500 mg oral tablet: 1 tab(s) orally once a day x 14 days   cloNIDine 0.1 mg oral tablet: 1 tab(s) orally 2 times a day  heparin: 5000 unit(s) subcutaneous 2 times a day  imipramine 50 mg oral tablet: 1 tab(s) orally once a day  insulin glargine 100 units/mL subcutaneous solution: 12 unit(s) subcutaneous once a day (at bedtime)  insulin lispro 100 units/mL injectable solution: injectable 3 times a day (before meals)  lactobacillus acidophilus oral tablet: 2 tab(s) orally once a day  melatonin 3 mg oral tablet: 1 tab(s) orally once a day (at bedtime), As needed, Insomnia  metoprolol tartrate 100 mg oral tablet: 1 tab(s) orally every 12 hours  Nephro-Alfie oral tablet: 1 tab(s) orally once a day  Nepro Carb Steady 0.08 gram-1.8 kcal/mL oral liquid: 240 milliliter(s) orally 2 times a day  pantoprazole 40 mg oral delayed release tablet: 1 tab(s) orally once a day (before a meal)  risperiDONE 0.5 mg oral tablet: 1 tab(s) orally 2 times a day  senna leaf extract oral tablet: 2 tab(s) orally once a day (at bedtime)  traMADol 50 mg oral tablet: 1 tab(s) orally 3 times a day, As needed, Moderate Pain (4 - 6)  zinc sulfate 220 mg oral capsule: 1 cap(s) orally once a day

## 2022-07-19 NOTE — DISCHARGE NOTE NURSING/CASE MANAGEMENT/SOCIAL WORK - NSDCVIVACCINE_GEN_ALL_CORE_FT
Tdap; 27-Nov-2015 19:03; Bob Kahn (LEV); [employer]; [id number]; IntraMuscular; Deltoid Left.; 0.5 milliLiter(s); VIS (VIS Published: 09-May-2013, VIS Presented: 27-Nov-2015);

## 2022-07-19 NOTE — PROGRESS NOTE ADULT - PROBLEM SELECTOR PROBLEM 5
ESRD on hemodialysis

## 2022-07-19 NOTE — PROGRESS NOTE ADULT - ASSESSMENT
71 year old female with PMH of Afib (not on AC for hx of multiple falls), T2DM, HTN, HLD, ESRD on HD (MWF), CHF, CAD s/p CABG, PAD S/P R-->L bifem-fem BK pop bypass, recent fempop bypass (6/21/2022), and partial ray amputation right great toe (6/22/2022) presented today from [facility] for wound checkup.  Patient states she noticed something wrong with her surgery site a week after since she has started putting weight on it.  She was recently discharged from  in 7/7 to [facility].  c/o pain 2 weeks ago especially if she puts pressure on her right foot.  Denies fever or chills .Admitted for podiatry evaluation and septic workup ,ID cons Palliative care consult requested ,to discuss advance directives and complete [facility] Pathology Final Diagnosis  06/23/22 1. Right hallux -Acute and chronic osteomyelitis. -Gangrenous skin and soft tissue. 2. Right first metatarsal/clean margin: -Minimal chronic osteomyelitis. Patient was on iv vancomycin with dialysis at [facility] . (14 Jul 2022 19:42)          esrd on hd plan for hd as order      Excess fluids and waste products will be removed from your blood; your electrolytes will be balanced; your blood pressure will be controlled.    ANEMIA PLAN:  Anemia of chronic disease:  We will continue epo  aiming for a HCT of 32-36 %.   We will monitor Iron stores, B12 and RBC folate .    BP monitoring,continue current antihypertensive meds, low salt diet  metoprolol tartrate 100 milliGRAM(s) Oral every 12 hours  cloNIDine 0.1 milliGRAM(s) Oral two times a day    f/u  blood and urine cx,serial lactate levels,monitor vitals closley,ivfs hydration,monitor urine output and renal profile,iv abx    dvt heparin   Injectable 5000 Unit(s) SubCutaneous every 12 hours    
  71 year old female with PMH of Afib (not on AC for hx of multiple falls), T2DM, HTN, HLD, ESRD on HD (MWF), CHF, CAD s/p CABG, PAD S/P R-->L bifem-fem BK pop bypass, recent fempop bypass (6/21/2022), and partial ray amputation right great toe (6/22/2022) presented today from [facility] for wound checkup.  Patient states she noticed something wrong with her surgery site a week after since she has started putting weight on it.  She was recently discharged from  in 7/7 to [facility].  c/o pain 2 weeks ago especially if she puts pressure on her right foot.  Denies fever or chills .Admitted for podiatry evaluation and septic workup ,ID cons Palliative care consult requested ,to discuss advance directives and complete [facility] Pathology Final Diagnosis  06/23/22 1. Right hallux -Acute and chronic osteomyelitis. -Gangrenous skin and soft tissue. 2. Right first metatarsal/clean margin: -Minimal chronic osteomyelitis. Patient was on iv vancomycin with dialysis at [facility] . (14 Jul 2022 19:42)          esrd on hd plan for hd as order      Excess fluids and waste products will be removed from your blood; your electrolytes will be balanced; your blood pressure will be controlled.    ANEMIA PLAN:  Anemia of chronic disease:  We will continue epo  aiming for a HCT of 32-36 %.   We will monitor Iron stores, B12 and RBC folate .    BP monitoring,continue current antihypertensive meds, low salt diet  metoprolol tartrate 100 milliGRAM(s) Oral every 12 hours  cloNIDine 0.1 milliGRAM(s) Oral two times a day    f/u  blood and urine cx,serial lactate levels,monitor vitals closley,ivfs hydration,monitor urine output and renal profile,iv abx    dvt heparin   Injectable 5000 Unit(s) SubCutaneous every 12 hours    
  71 year old female with PMH of Afib (not on AC for hx of multiple falls), T2DM, HTN, HLD, ESRD on HD (MWF), CHF, CAD s/p CABG, PAD S/P R-->L bifem-fem BK pop bypass, recent fempop bypass (6/21/2022), and partial ray amputation right great toe (6/22/2022) presented today from [facility] for wound checkup.  Patient states she noticed something wrong with her surgery site a week after since she has started putting weight on it.  She was recently discharged from  in 7/7 to [facility].  c/o pain 2 weeks ago especially if she puts pressure on her right foot.  Denies fever or chills .Admitted for podiatry evaluation and septic workup ,ID cons Palliative care consult requested ,to discuss advance directives and complete [facility] Pathology Final Diagnosis  06/23/22 1. Right hallux -Acute and chronic osteomyelitis. -Gangrenous skin and soft tissue. 2. Right first metatarsal/clean margin: -Minimal chronic osteomyelitis. Patient was on iv vancomycin with dialysis at [facility] . (14 Jul 2022 19:42)          esrd on hd plan for hd as order      Excess fluids and waste products will be removed from your blood; your electrolytes will be balanced; your blood pressure will be controlled.    ANEMIA PLAN:  Anemia of chronic disease:  We will continue epo  aiming for a HCT of 32-36 %.   We will monitor Iron stores, B12 and RBC folate .    BP monitoring,continue current antihypertensive meds, low salt diet  metoprolol tartrate 100 milliGRAM(s) Oral every 12 hours  cloNIDine 0.1 milliGRAM(s) Oral two times a day    f/u  blood and urine cx,serial lactate levels,monitor vitals closley,ivfs hydration,monitor urine output and renal profile,iv abx    dvt heparin   Injectable 5000 Unit(s) SubCutaneous every 12 hours    
  71 year old female with PMH of Afib (not on AC for hx of multiple falls), T2DM, HTN, HLD, ESRD on HD (MWF), CHF, CAD s/p CABG, PAD S/P R-->L bifem-fem BK pop bypass, recent fempop bypass (6/21/2022), and partial ray amputation right great toe (6/22/2022) presented today from [facility] for wound checkup.  Patient states she noticed something wrong with her surgery site a week after since she has started putting weight on it.  She was recently discharged from  in 7/7 to [facility].  c/o pain 2 weeks ago especially if she puts pressure on her right foot.  Denies fever or chills .Admitted for podiatry evaluation and septic workup ,ID cons Palliative care consult requested ,to discuss advance directives and complete [facility] Pathology Final Diagnosis  06/23/22 1. Right hallux -Acute and chronic osteomyelitis. -Gangrenous skin and soft tissue. 2. Right first metatarsal/clean margin: -Minimal chronic osteomyelitis. Patient was on iv vancomycin with dialysis at [facility] . (14 Jul 2022 19:42)          esrd on hd plan for hd as order      Excess fluids and waste products will be removed from your blood; your electrolytes will be balanced; your blood pressure will be controlled.    ANEMIA PLAN:  Anemia of chronic disease:  We will continue epo  aiming for a HCT of 32-36 %.   We will monitor Iron stores, B12 and RBC folate .    BP monitoring,continue current antihypertensive meds, low salt diet  metoprolol tartrate 100 milliGRAM(s) Oral every 12 hours  cloNIDine 0.1 milliGRAM(s) Oral two times a day    f/u  blood and urine cx,serial lactate levels,monitor vitals closley,ivfs hydration,monitor urine output and renal profile,iv abx    dvt heparin   Injectable 5000 Unit(s) SubCutaneous every 12 hours    
71 year old female with PMH of Afib (not on AC for hx of multiple falls), T2DM, HTN, HLD, ESRD on HD (MWF), CHF, CAD s/p CABG, PAD S/P R-->L bifem-fem BK pop bypass, recent fempop bypass (6/21/2022), and partial ray amputation right great toe (6/22/2022) presented today from [facility] for wound checkup.  Patient states she noticed something wrong with her surgery site a week after since she has started putting weight on it.  She was recently discharged from  in 7/7 to [facility].  c/o pain 2 weeks ago especially if she puts pressure on her right foot.  Denies fever or chills .Admitted for podiatry evaluation and septic workup ,ID cons Palliative care consult requested ,to discuss advance directives and complete [facility] Pathology Final Diagnosis  06/23/22 1. Right hallux -Acute and chronic osteomyelitis. -Gangrenous skin and soft tissue. 2. Right first metatarsal/clean margin: -Minimal chronic osteomyelitis. Patient was on iv vancomycin with dialysis at [facility] .ESR 39 and CRP 14  MRSA PCR negative   6/21 s/p Femoral popliteal bypass with prosthetic graft  6/23 s/p R 1st toe ray amputation, Wound culture from OR with MSSA and enterococcus fecalis   Pathology showed OM in margines so need 6 weeks treatment from the start   Was on Unasyn to complete 6 weeks on 7/26.   No leukocytosis or fever on admission   Seen  by ID CONS   Recommendations:  - Will follow blood cultures   - Wound culture has been sent x 2 , will follow   - Vascular surgery and podiatry consults   - Based on old cultures will continue zosyn or unasyn until wound culture is back
71 year old female with PMH of Afib (not on AC for hx of multiple falls), T2DM, HTN, HLD, ESRD on HD (MWF), CHF, CAD s/p CABG, PAD S/P R-->L bifem-fem BK pop bypass, recent fempop bypass (6/21/2022), and partial ray amputation right great toe (6/22/2022) presented today from [facility] for wound checkup.  Patient states she noticed something wrong with her surgery site a week after since she has started putting weight on it.  She was recently discharged from  in 7/7 to [facility].  c/o pain 2 weeks ago especially if she puts pressure on her right foot.  Denies fever or chills .Admitted for podiatry evaluation and septic workup ,ID cons Palliative care consult requested ,to discuss advance directives and complete [facility] Pathology Final Diagnosis  06/23/22 1. Right hallux -Acute and chronic osteomyelitis. -Gangrenous skin and soft tissue. 2. Right first metatarsal/clean margin: -Minimal chronic osteomyelitis. Patient was on iv vancomycin with dialysis at [facility] .ESR 39 and CRP 14  MRSA PCR negative   6/21 s/p Femoral popliteal bypass with prosthetic graft  6/23 s/p R 1st toe ray amputation, Wound culture from OR with MSSA and enterococcus fecalis   Pathology showed OM in margines so need 6 weeks treatment from the start   Was on Unasyn to complete 6 weeks on 7/26.   No leukocytosis or fever on admission   Seen  by ID CONS   Recommendations:  - Will follow blood cultures   - Wound culture has been sent x 2 , will follow   - Vascular surgery and podiatry consults   - Based on old cultures will continue zosyn or unasyn until wound culture is back
s/p partial  amputation of rt big toe-dehisence  on zosyn  pvd-s/p femoral- popliteal bypass  esrd- on hd  ashd- s/p cabg  paf-not on oac- frequent falls  dm2  dyslipidemia  hypertension
  71 year old female with PMH of Afib (not on AC for hx of multiple falls), T2DM, HTN, HLD, ESRD on HD (MWF), CHF, CAD s/p CABG, PAD S/P R-->L bifem-fem BK pop bypass, recent fempop bypass (6/21/2022), and partial ray amputation right great toe (6/22/2022) presented today from [facility] for wound checkup.  Patient states she noticed something wrong with her surgery site a week after since she has started putting weight on it.  She was recently discharged from  in 7/7 to [facility].  c/o pain 2 weeks ago especially if she puts pressure on her right foot.  Denies fever or chills .Admitted for podiatry evaluation and septic workup ,ID cons Palliative care consult requested ,to discuss advance directives and complete [facility] Pathology Final Diagnosis  06/23/22 1. Right hallux -Acute and chronic osteomyelitis. -Gangrenous skin and soft tissue. 2. Right first metatarsal/clean margin: -Minimal chronic osteomyelitis. Patient was on iv vancomycin with dialysis at [facility] . (14 Jul 2022 19:42)          esrd on hd plan for hd as order      Excess fluids and waste products will be removed from your blood; your electrolytes will be balanced; your blood pressure will be controlled.    ANEMIA PLAN:  Anemia of chronic disease:  We will continue epo  aiming for a HCT of 32-36 %.   We will monitor Iron stores, B12 and RBC folate .    BP monitoring,continue current antihypertensive meds, low salt diet  metoprolol tartrate 100 milliGRAM(s) Oral every 12 hours  cloNIDine 0.1 milliGRAM(s) Oral two times a day    f/u  blood and urine cx,serial lactate levels,monitor vitals closley,ivfs hydration,monitor urine output and renal profile,iv abx    dvt heparin   Injectable 5000 Unit(s) SubCutaneous every 12 hours    
71 year old female with PMH of Afib (not on AC for hx of multiple falls), T2DM, HTN, HLD, ESRD on HD (MWF), CHF, CAD s/p CABG, PAD S/P R-->L bifem-fem BK pop bypass, recent fempop bypass (6/21/2022), and partial ray amputation right great toe (6/22/2022) presented today from [facility] for wound checkup.  Patient states she noticed something wrong with her surgery site a week after since she has started putting weight on it.  She was recently discharged from  in 7/7 to [facility].  c/o pain 2 weeks ago especially if she puts pressure on her right foot.  Denies fever or chills .Admitted for podiatry evaluation and septic workup ,ID cons Palliative care consult requested ,to discuss advance directives and complete [facility] Pathology Final Diagnosis  06/23/22 1. Right hallux -Acute and chronic osteomyelitis. -Gangrenous skin and soft tissue. 2. Right first metatarsal/clean margin: -Minimal chronic osteomyelitis. Patient was on iv vancomycin with dialysis at [facility] .ESR 39 and CRP 14  MRSA PCR negative   6/21 s/p Femoral popliteal bypass with prosthetic graft  6/23 s/p R 1st toe ray amputation, Wound culture from OR with MSSA and enterococcus fecalis   Pathology showed OM in margines so need 6 weeks treatment from the start   Was on Unasyn to complete 6 weeks on 7/26.   No leukocytosis or fever on admission   Seen  by ID CONS   Recommendations:  - Will follow blood cultures   - Wound culture has been sent x 2 , will follow   - Vascular surgery and podiatry consults   - Based on old cultures will continue zosyn or unasyn until wound culture is back
s/p partial  amputation of rt big toe-dehisence  on zosyn  pvd-s/p femoral- popliteal bypass  esrd- on hd  ashd- s/p cabg  paf-not on oac- frequent falls  dm2  dyslipidemia  hypertension
71 year old female with PMH of Afib (not on AC for hx of multiple falls), T2DM, HTN, HLD, ESRD on HD (MWF), CHF, CAD s/p CABG, PAD S/P R-->L bifem-fem BK pop bypass, recent fempop bypass (6/21/2022), and partial ray amputation right great toe (6/22/2022) seen for dehisced surgical site to the right foot, 1st ray
71 year old female with PMH of Afib (not on AC for hx of multiple falls), T2DM, HTN, HLD, ESRD on HD (MWF), CHF, CAD s/p CABG, PAD S/P R-->L bifem-fem BK pop bypass, recent fempop bypass (6/21/2022), and partial ray amputation right great toe (6/22/2022) presented today from [facility] for wound checkup.  Patient states she noticed something wrong with her surgery site a week after since she has started putting weight on it.  She was recently discharged from  in 7/7 to [facility].  c/o pain 2 weeks ago especially if she puts pressure on her right foot.  Denies fever or chills .Admitted for podiatry evaluation and septic workup ,ID cons Palliative care consult requested ,to discuss advance directives and complete [facility] Pathology Final Diagnosis  06/23/22 1. Right hallux -Acute and chronic osteomyelitis. -Gangrenous skin and soft tissue. 2. Right first metatarsal/clean margin: -Minimal chronic osteomyelitis. Patient was on iv vancomycin with dialysis at [facility] .ESR 39 and CRP 14  MRSA PCR negative   6/21 s/p Femoral popliteal bypass with prosthetic graft  6/23 s/p R 1st toe ray amputation, Wound culture from OR with MSSA and enterococcus fecalis   Pathology showed OM in margines so need 6 weeks treatment from the start   Was on Unasyn to complete 6 weeks on 7/26.   No leukocytosis or fever on admission   Seen  by ID CONS   Recommendations:  - Will follow blood cultures   - Wound culture has been sent x 2 , will follow   - Vascular surgery and podiatry consults   - Based on old cultures will continue zosyn or unasyn until wound culture is back
71 year old female with PMH of Afib (not on AC for hx of multiple falls), T2DM, HTN, HLD, ESRD on HD (MWF), CHF, CAD s/p CABG, PAD S/P R-->L bifem-fem BK pop bypass, recent fempop bypass (6/21/2022), and partial ray amputation right great toe (6/22/2022) seen for dehisced surgical site to the right foot, 1st ray
71 year old female with PMH of Afib (not on AC for hx of multiple falls), T2DM, HTN, HLD, ESRD on HD (MWF), CHF, CAD s/p CABG, PAD S/P R-->L bifem-fem BK pop bypass, recent fempop bypass (6/21/2022), and partial ray amputation right great toe (6/22/2022) seen for dehisced surgical site to the right foot, 1st ray
71 year old female with PMH of Afib (not on AC for hx of multiple falls), T2DM, HTN, HLD, ESRD on HD (MWF), CHF, CAD s/p CABG, PAD S/P R-->L bifem-fem BK pop bypass, recent fempop bypass (6/21/2022), and partial ray amputation right great toe (6/22/2022) presented today from [facility] for wound checkup.  Patient states she noticed something wrong with her surgery site a week after since she has started putting weight on it.  She was recently discharged from  in 7/7 to [facility].  c/o pain 2 weeks ago especially if she puts pressure on her right foot.  Denies fever or chills .Admitted for podiatry evaluation and septic workup ,ID cons Palliative care consult requested ,to discuss advance directives and complete [facility] Pathology Final Diagnosis  06/23/22 1. Right hallux -Acute and chronic osteomyelitis. -Gangrenous skin and soft tissue. 2. Right first metatarsal/clean margin: -Minimal chronic osteomyelitis. Patient was on iv vancomycin with dialysis at [facility] .ESR 39 and CRP 14  MRSA PCR negative   6/21 s/p Femoral popliteal bypass with prosthetic graft  6/23 s/p R 1st toe ray amputation, Wound culture from OR with MSSA and enterococcus fecalis   Pathology showed OM in margines so need 6 weeks treatment from the start   Was on Unasyn to complete 6 weeks on 7/26.   No leukocytosis or fever on admission   Seen  by ID CONS   Recommendations:  - Will follow blood cultures   - Wound culture has been sent x 2 , will follow   - Vascular surgery and podiatry consults   - Based on old cultures will continue zosyn or unasyn until wound culture is back
FT Appropriate for gestational age  Encourage breast feeding  watch daily weights , feeding , voiding and stooling.  Well New Born care including Hearing screen ,  state screen , CCHD.  Maren Rodriguez MD  Attending Pediatric Hospitalist   [facility]

## 2022-07-19 NOTE — PROGRESS NOTE ADULT - PROBLEM SELECTOR PROBLEM 1
Dehiscence of wound
DM (diabetes mellitus)
Dehiscence of wound
Dehiscence of wound
Cellulitis

## 2022-07-19 NOTE — DISCHARGE NOTE PROVIDER - DETAILS OF MALNUTRITION DIAGNOSIS/DIAGNOSES
This patient has been assessed with a concern for Malnutrition and was treated during this hospitalization for the following Nutrition diagnosis/diagnoses:     -  07/16/2022: Severe protein-calorie malnutrition   -  07/16/2022: Underweight (BMI < 19)

## 2022-07-19 NOTE — PROGRESS NOTE ADULT - PROVIDER SPECIALTY LIST ADULT
Cardiology
Cardiology
Infectious Disease
Nephrology
Nephrology
Cardiology
Infectious Disease
Nephrology
Endocrinology
Podiatry
Hospitalist
Podiatry
Podiatry
Hospitalist
Hospitalist

## 2022-07-19 NOTE — PROGRESS NOTE ADULT - PROBLEM SELECTOR PLAN 7
1. Please remove the dressing with care and cleanse the wound with saline.   2. Apply Aquacell alginate or any alginate to the wound bed with dry, sterile dressing. Change daily.   3. Patient to be partial weight bear to the right heel as tolerated with assisted device (pt has a history of falls)  4. Patient is to follow up [facility] with Dr. Lau or Dr. Pastor within 3-5 days upon discharge. Please call [phone] as soon as discharged and state that it is for a "post-op appointmen

## 2022-07-19 NOTE — DISCHARGE NOTE NURSING/CASE MANAGEMENT/SOCIAL WORK - PATIENT PORTAL LINK FT
You can access the FollowMyHealth Patient Portal offered by [facility] by registering at the following website: [email or web address]. By joining [facility]’s FollowMyHealth portal, you will also be able to view your health information using other applications (apps) compatible with our system.

## 2022-07-19 NOTE — CHART NOTE - NSCHARTNOTEFT_GEN_A_CORE
Nutr.re-assessment/follow up:     Factors impacting intake: [ ] none [ ] nausea  [ ] vomiting [ ] diarrhea [ ] constipation  [ ]chewing problems [ ] swallowing issues  [ ] other:     Diet Prescription:   Intake:     Current Weight: Weight (kg): 49.396 (07-12 @ 08:55)  % Weight Change    Pertinent Medications: MEDICATIONS  (STANDING):  aspirin enteric coated 81 milliGRAM(s) Oral daily  atorvastatin 40 milliGRAM(s) Oral at bedtime  calcium acetate 667 milliGRAM(s) Oral three times a day with meals  ciprofloxacin     Tablet 500 milliGRAM(s) Oral daily  cloNIDine 0.1 milliGRAM(s) Oral two times a day  dextrose 5%. 1000 milliLiter(s) (50 mL/Hr) IV Continuous <Continuous>  dextrose 5%. 1000 milliLiter(s) (100 mL/Hr) IV Continuous <Continuous>  dextrose 50% Injectable 12.5 Gram(s) IV Push once  dextrose 50% Injectable 25 Gram(s) IV Push once  dextrose 50% Injectable 25 Gram(s) IV Push once  epoetin fawad-epbx (RETACRIT) Injectable 68428 Unit(s) IV Push <User Schedule>  glucagon  Injectable 1 milliGRAM(s) IntraMuscular once  heparin   Injectable 5000 Unit(s) SubCutaneous every 8 hours  imipramine 50 milliGRAM(s) Oral daily  insulin glargine Injectable (LANTUS) 10 Unit(s) SubCutaneous at bedtime  insulin lispro (ADMELOG) corrective regimen sliding scale   SubCutaneous three times a day before meals  insulin lispro (ADMELOG) corrective regimen sliding scale   SubCutaneous at bedtime  insulin lispro Injectable (ADMELOG) 2 Unit(s) SubCutaneous three times a day before meals  lactobacillus acidophilus 1 Tablet(s) Oral two times a day  metoprolol tartrate 100 milliGRAM(s) Oral every 12 hours  Nephro-elvie 1 Tablet(s) Oral daily  pantoprazole    Tablet 40 milliGRAM(s) Oral before breakfast  risperiDONE   Tablet 0.5 milliGRAM(s) Oral two times a day  senna 2 Tablet(s) Oral at bedtime  zinc sulfate 220 milliGRAM(s) Oral daily    MEDICATIONS  (PRN):  acetaminophen     Tablet .. 650 milliGRAM(s) Oral every 6 hours PRN Temp greater or equal to 38C (100.4F), Mild Pain (1 - 3)  aluminum hydroxide/magnesium hydroxide/simethicone Suspension 30 milliLiter(s) Oral every 4 hours PRN Dyspepsia  bisacodyl Suppository 10 milliGRAM(s) Rectal daily PRN Constipation  dextrose Oral Gel 15 Gram(s) Oral once PRN Blood Glucose LESS THAN 70 milliGRAM(s)/deciliter  melatonin 3 milliGRAM(s) Oral at bedtime PRN Insomnia  ondansetron Injectable 4 milliGRAM(s) IV Push every 8 hours PRN Nausea and/or Vomiting  traMADol 50 milliGRAM(s) Oral three times a day PRN Moderate Pain (4 - 6)    Pertinent Labs: 07-18 Na133 mmol/L<L> Glu 389 mg/dL<H> K+ 4.2 mmol/L Cr  5.80 mg/dL<H> BUN 69 mg/dL<H> 07-15 Alb 2.3 g/dL<L> 07-15 Chol 174 mg/dL LDL --    HDL 53 mg/dL Trig 170 mg/dL<H>     CAPILLARY BLOOD GLUCOSE      POCT Blood Glucose.: 250 mg/dL (19 Jul 2022 07:36)  POCT Blood Glucose.: 284 mg/dL (18 Jul 2022 21:56)  POCT Blood Glucose.: 153 mg/dL (18 Jul 2022 17:23)  POCT Blood Glucose.: 155 mg/dL (18 Jul 2022 16:35)  POCT Blood Glucose.: 359 mg/dL (18 Jul 2022 11:59)    Skin:     Estimated Needs:   [ ] no change since previous assessment  [ ] recalculated:     Previous Nutrition Diagnosis:   [ ] Inadequate Energy Intake [ ]Inadequate Oral Intake [ ] Excessive Energy Intake   [ ] Underweight [ ] Increased Nutrient Needs [ ] Overweight/Obesity   [ ] Altered GI Function [ ] Unintended Weight Loss [ ] Food & Nutrition Related Knowledge Deficit [ ] Malnutrition     Nutrition Diagnosis is [ ] ongoing  [ ] resolved [ ] not applicable     New Nutrition Diagnosis: [ ] not applicable       Interventions:   Recommend  [ ] Change Diet To:  [ ] Nutrition Supplement  [ ] Nutrition Support  [ ] Other:     Monitoring and Evaluation:   [ ] PO intake [ x ] Tolerance to diet prescription [ x ] weights [ x ] labs[ x ] follow up per protocol  [ ] other: Nutr.re-assessment/follow up: ( excerpt from [facility] progress note) 72 yo woman with PMH of HTN, DM2, ESRD on HD, Afib, CHF, CAD s/p CABG, PAD S/P R-->L bifem-fem/Pop bypass, recent R fem/pop bypass on 6/21/2022 and partial ray amputation right great toe 6/23/2022   was admitted from [facility] for wound dehiscence and infection. Pt reports good appetite and intake for meals. RD observed pt at breakfast this am and ate ~75% served. She reports she drinks three 8 oz Nepro supplements daily . She is unhappy that she is on a soft bite sized texture diet, wants regular. nsg aware. SLP saw pt yesterday and recommended current diet as she has no bottom teeth    Factors impacting intake: [ X] none [ ] nausea  [ ] vomiting [ ] diarrhea [ ] constipation  [ ]chewing problems [ ] swallowing issues  [ ] other:     Diet Prescription:   Intake:     Current Weight: Weight (kg): 49.396 (07-12 @ 08:55)  % Weight Change    Pertinent Medications: MEDICATIONS  (STANDING):  aspirin enteric coated 81 milliGRAM(s) Oral daily  atorvastatin 40 milliGRAM(s) Oral at bedtime  calcium acetate 667 milliGRAM(s) Oral three times a day with meals  ciprofloxacin     Tablet 500 milliGRAM(s) Oral daily  cloNIDine 0.1 milliGRAM(s) Oral two times a day  dextrose 5%. 1000 milliLiter(s) (50 mL/Hr) IV Continuous <Continuous>  dextrose 5%. 1000 milliLiter(s) (100 mL/Hr) IV Continuous <Continuous>  dextrose 50% Injectable 12.5 Gram(s) IV Push once  dextrose 50% Injectable 25 Gram(s) IV Push once  dextrose 50% Injectable 25 Gram(s) IV Push once  epoetin fawad-epbx (RETACRIT) Injectable 44592 Unit(s) IV Push <User Schedule>  glucagon  Injectable 1 milliGRAM(s) IntraMuscular once  heparin   Injectable 5000 Unit(s) SubCutaneous every 8 hours  imipramine 50 milliGRAM(s) Oral daily  insulin glargine Injectable (LANTUS) 10 Unit(s) SubCutaneous at bedtime  insulin lispro (ADMELOG) corrective regimen sliding scale   SubCutaneous three times a day before meals  insulin lispro (ADMELOG) corrective regimen sliding scale   SubCutaneous at bedtime  insulin lispro Injectable (ADMELOG) 2 Unit(s) SubCutaneous three times a day before meals  lactobacillus acidophilus 1 Tablet(s) Oral two times a day  metoprolol tartrate 100 milliGRAM(s) Oral every 12 hours  Nephro-elvie 1 Tablet(s) Oral daily  pantoprazole    Tablet 40 milliGRAM(s) Oral before breakfast  risperiDONE   Tablet 0.5 milliGRAM(s) Oral two times a day  senna 2 Tablet(s) Oral at bedtime  zinc sulfate 220 milliGRAM(s) Oral daily    MEDICATIONS  (PRN):  acetaminophen     Tablet .. 650 milliGRAM(s) Oral every 6 hours PRN Temp greater or equal to 38C (100.4F), Mild Pain (1 - 3)  aluminum hydroxide/magnesium hydroxide/simethicone Suspension 30 milliLiter(s) Oral every 4 hours PRN Dyspepsia  bisacodyl Suppository 10 milliGRAM(s) Rectal daily PRN Constipation  dextrose Oral Gel 15 Gram(s) Oral once PRN Blood Glucose LESS THAN 70 milliGRAM(s)/deciliter  melatonin 3 milliGRAM(s) Oral at bedtime PRN Insomnia  ondansetron Injectable 4 milliGRAM(s) IV Push every 8 hours PRN Nausea and/or Vomiting  traMADol 50 milliGRAM(s) Oral three times a day PRN Moderate Pain (4 - 6)    Pertinent Labs: 07-18 Na133 mmol/L<L> Glu 389 mg/dL<H> K+ 4.2 mmol/L Cr  5.80 mg/dL<H> BUN 69 mg/dL<H> 07-15 Alb 2.3 g/dL<L> 07-15 Chol 174 mg/dL LDL --    HDL 53 mg/dL Trig 170 mg/dL<H>     CAPILLARY BLOOD GLUCOSE      POCT Blood Glucose.: 250 mg/dL (19 Jul 2022 07:36)  POCT Blood Glucose.: 284 mg/dL (18 Jul 2022 21:56)  POCT Blood Glucose.: 153 mg/dL (18 Jul 2022 17:23)  POCT Blood Glucose.: 155 mg/dL (18 Jul 2022 16:35)  POCT Blood Glucose.: 359 mg/dL (18 Jul 2022 11:59)    Skin:     Estimated Needs:   [ ] no change since previous assessment  [ ] recalculated:     Previous Nutrition Diagnosis:   [ ] Inadequate Energy Intake [ ]Inadequate Oral Intake [ ] Excessive Energy Intake   [ ] Underweight [ ] Increased Nutrient Needs [ ] Overweight/Obesity   [ ] Altered GI Function [ ] Unintended Weight Loss [ ] Food & Nutrition Related Knowledge Deficit [ ] Malnutrition     Nutrition Diagnosis is [ ] ongoing  [ ] resolved [ ] not applicable     New Nutrition Diagnosis: [ ] not applicable       Interventions:   Recommend  [ ] Change Diet To:  [ ] Nutrition Supplement  [ ] Nutrition Support  [ ] Other:     Monitoring and Evaluation:   [ ] PO intake [ x ] Tolerance to diet prescription [ x ] weights [ x ] labs[ x ] follow up per protocol  [ ] other: Nutr.re-assessment/follow up: ( excerpt from [facility] progress note) 70 yo woman with PMH of HTN, DM2, ESRD on HD, Afib, CHF, CAD s/p CABG, PAD S/P R-->L bifem-fem/Pop bypass, recent R fem/pop bypass on 6/21/2022 and partial ray amputation right great toe 6/23/2022   was admitted from [facility] for wound dehiscence and infection. Pt reports good appetite and intake for meals. RD observed pt at breakfast this am and ate ~75% served. She reports she drinks three 8 oz Nepro supplements daily . She is unhappy that she is on a soft bite sized texture diet, wants regular. nsg aware. SLP saw pt yesterday and recommended current diet as she has no bottom teeth    Factors impacting intake: [ X] none [ ] nausea  [ ] vomiting [ ] diarrhea [ ] constipation  [ ]chewing problems [ ] swallowing issues  [ ] other:     Diet Prescription: soft bite sized consistent carbohydrate ( no snack) renal with Nepro  Intake:     Current Weight: Weight (kg): 49.396 (07-12 @ 08:55)  % Weight Change    Pertinent Medications: MEDICATIONS  (STANDING):  aspirin enteric coated 81 milliGRAM(s) Oral daily  atorvastatin 40 milliGRAM(s) Oral at bedtime  calcium acetate 667 milliGRAM(s) Oral three times a day with meals  ciprofloxacin     Tablet 500 milliGRAM(s) Oral daily  cloNIDine 0.1 milliGRAM(s) Oral two times a day  dextrose 5%. 1000 milliLiter(s) (50 mL/Hr) IV Continuous <Continuous>  dextrose 5%. 1000 milliLiter(s) (100 mL/Hr) IV Continuous <Continuous>  dextrose 50% Injectable 12.5 Gram(s) IV Push once  dextrose 50% Injectable 25 Gram(s) IV Push once  dextrose 50% Injectable 25 Gram(s) IV Push once  epoetin fawad-epbx (RETACRIT) Injectable 90077 Unit(s) IV Push <User Schedule>  glucagon  Injectable 1 milliGRAM(s) IntraMuscular once  heparin   Injectable 5000 Unit(s) SubCutaneous every 8 hours  imipramine 50 milliGRAM(s) Oral daily  insulin glargine Injectable (LANTUS) 10 Unit(s) SubCutaneous at bedtime  insulin lispro (ADMELOG) corrective regimen sliding scale   SubCutaneous three times a day before meals  insulin lispro (ADMELOG) corrective regimen sliding scale   SubCutaneous at bedtime  insulin lispro Injectable (ADMELOG) 2 Unit(s) SubCutaneous three times a day before meals  lactobacillus acidophilus 1 Tablet(s) Oral two times a day  metoprolol tartrate 100 milliGRAM(s) Oral every 12 hours  Nephro-elvie 1 Tablet(s) Oral daily  pantoprazole    Tablet 40 milliGRAM(s) Oral before breakfast  risperiDONE   Tablet 0.5 milliGRAM(s) Oral two times a day  senna 2 Tablet(s) Oral at bedtime  zinc sulfate 220 milliGRAM(s) Oral daily    MEDICATIONS  (PRN):  acetaminophen     Tablet .. 650 milliGRAM(s) Oral every 6 hours PRN Temp greater or equal to 38C (100.4F), Mild Pain (1 - 3)  aluminum hydroxide/magnesium hydroxide/simethicone Suspension 30 milliLiter(s) Oral every 4 hours PRN Dyspepsia  bisacodyl Suppository 10 milliGRAM(s) Rectal daily PRN Constipation  dextrose Oral Gel 15 Gram(s) Oral once PRN Blood Glucose LESS THAN 70 milliGRAM(s)/deciliter  melatonin 3 milliGRAM(s) Oral at bedtime PRN Insomnia  ondansetron Injectable 4 milliGRAM(s) IV Push every 8 hours PRN Nausea and/or Vomiting  traMADol 50 milliGRAM(s) Oral three times a day PRN Moderate Pain (4 - 6)    Pertinent Labs: 07-18 Na133 mmol/L<L> Glu 389 mg/dL<H> K+ 4.2 mmol/L Cr  5.80 mg/dL<H> BUN 69 mg/dL<H> 07-15 Alb 2.3 g/dL<L> 07-15 Chol 174 mg/dL LDL --    HDL 53 mg/dL Trig 170 mg/dL<H>     CAPILLARY BLOOD GLUCOSE      POCT Blood Glucose.: 250 mg/dL (19 Jul 2022 07:36)  POCT Blood Glucose.: 284 mg/dL (18 Jul 2022 21:56)  POCT Blood Glucose.: 153 mg/dL (18 Jul 2022 17:23)  POCT Blood Glucose.: 155 mg/dL (18 Jul 2022 16:35)  POCT Blood Glucose.: 359 mg/dL (18 Jul 2022 11:59)    Skin:     Estimated Needs:   [ ] no change since previous assessment  [ ] recalculated:     Previous Nutrition Diagnosis:   [ ] Inadequate Energy Intake [ ]Inadequate Oral Intake [ ] Excessive Energy Intake   [ ] Underweight [ ] Increased Nutrient Needs [ ] Overweight/Obesity   [ ] Altered GI Function [ ] Unintended Weight Loss [ ] Food & Nutrition Related Knowledge Deficit [ ] Malnutrition     Nutrition Diagnosis is [ ] ongoing  [ ] resolved [ ] not applicable     New Nutrition Diagnosis: [ ] not applicable       Interventions:   Recommend  [ ] Change Diet To:  [ ] Nutrition Supplement  [ ] Nutrition Support  [ ] Other:     Monitoring and Evaluation:   [ ] PO intake [ x ] Tolerance to diet prescription [ x ] weights [ x ] labs[ x ] follow up per protocol  [ ] other: Nutr.re-assessment/follow up: ( excerpt from [facility] progress note) 70 yo woman with PMH of HTN, DM2, ESRD on HD, Afib, CHF, CAD s/p CABG, PAD S/P R-->L bifem-fem/Pop bypass, recent R fem/pop bypass on 6/21/2022 and partial ray amputation right great toe 6/23/2022   was admitted from [facility] for wound dehiscence and infection. Pt reports good appetite and intake for meals. RD observed pt at breakfast this am and ate ~75% served. She reports she drinks three 8 oz Nepro supplements daily . She is unhappy that she is on a soft bite sized texture diet, wants regular. nsg aware. SLP saw pt yesterday and recommended current diet as she has no bottom teeth.    Factors impacting intake: [ X] none [ ] nausea  [ ] vomiting [ ] diarrhea [ ] constipation  [ ]chewing problems [ ] swallowing issues  [ ] other:     Diet Prescription: soft bite sized consistent carbohydrate ( no snack) renal with Nepro  ( 8 oz) 3 times daily   Intake: 75% diet, 100% supplements    Current Weight: Weight (kg): 49.396 (07-12 @ 08:55)  52.5 kg ( 7/18/22 post HD)  54 kg ( pre HD)   % Weight Change: wt fluctuations with scale accuracy and day/time ( HD or not HD day, pre and post HD)     Pertinent Medications: MEDICATIONS  (STANDING):  aspirin enteric coated 81 milliGRAM(s) Oral daily  atorvastatin 40 milliGRAM(s) Oral at bedtime  calcium acetate 667 milliGRAM(s) Oral three times a day with meals  ciprofloxacin     Tablet 500 milliGRAM(s) Oral daily  cloNIDine 0.1 milliGRAM(s) Oral two times a day  dextrose 5%. 1000 milliLiter(s) (50 mL/Hr) IV Continuous <Continuous>  dextrose 5%. 1000 milliLiter(s) (100 mL/Hr) IV Continuous <Continuous>  dextrose 50% Injectable 12.5 Gram(s) IV Push once  dextrose 50% Injectable 25 Gram(s) IV Push once  dextrose 50% Injectable 25 Gram(s) IV Push once  epoetin fawad-epbx (RETACRIT) Injectable 43766 Unit(s) IV Push <User Schedule>  glucagon  Injectable 1 milliGRAM(s) IntraMuscular once  heparin   Injectable 5000 Unit(s) SubCutaneous every 8 hours  imipramine 50 milliGRAM(s) Oral daily  insulin glargine Injectable (LANTUS) 10 Unit(s) SubCutaneous at bedtime  insulin lispro (ADMELOG) corrective regimen sliding scale   SubCutaneous three times a day before meals  insulin lispro (ADMELOG) corrective regimen sliding scale   SubCutaneous at bedtime  insulin lispro Injectable (ADMELOG) 2 Unit(s) SubCutaneous three times a day before meals  lactobacillus acidophilus 1 Tablet(s) Oral two times a day  metoprolol tartrate 100 milliGRAM(s) Oral every 12 hours  Nephro-elvie 1 Tablet(s) Oral daily  pantoprazole    Tablet 40 milliGRAM(s) Oral before breakfast  risperiDONE   Tablet 0.5 milliGRAM(s) Oral two times a day  senna 2 Tablet(s) Oral at bedtime  zinc sulfate 220 milliGRAM(s) Oral daily    MEDICATIONS  (PRN):  acetaminophen     Tablet .. 650 milliGRAM(s) Oral every 6 hours PRN Temp greater or equal to 38C (100.4F), Mild Pain (1 - 3)  aluminum hydroxide/magnesium hydroxide/simethicone Suspension 30 milliLiter(s) Oral every 4 hours PRN Dyspepsia  bisacodyl Suppository 10 milliGRAM(s) Rectal daily PRN Constipation  dextrose Oral Gel 15 Gram(s) Oral once PRN Blood Glucose LESS THAN 70 milliGRAM(s)/deciliter  melatonin 3 milliGRAM(s) Oral at bedtime PRN Insomnia  ondansetron Injectable 4 milliGRAM(s) IV Push every 8 hours PRN Nausea and/or Vomiting  traMADol 50 milliGRAM(s) Oral three times a day PRN Moderate Pain (4 - 6)    Pertinent Labs: 07-18 Na133 mmol/L<L> Glu 389 mg/dL<H> K+ 4.2 mmol/L Cr  5.80 mg/dL<H> BUN 69 mg/dL<H> 07-15 Alb 2.3 g/dL<L> 07-15 Chol 174 mg/dL LDL --    HDL 53 mg/dL Trig 170 mg/dL<H>     CAPILLARY BLOOD GLUCOSE      POCT Blood Glucose.: 250 mg/dL (19 Jul 2022 07:36)  POCT Blood Glucose.: 284 mg/dL (18 Jul 2022 21:56)  POCT Blood Glucose.: 153 mg/dL (18 Jul 2022 17:23)  POCT Blood Glucose.: 155 mg/dL (18 Jul 2022 16:35)  POCT Blood Glucose.: 359 mg/dL (18 Jul 2022 11:59)    Skin: stage II PI to sacrum  BM: last noted 7/15, pt denies feeling constipated  edema: 1+ R foot and ankle      Estimated Needs:   [X ] no change since previous assessment  [ ] recalculated:     Previous Nutrition Diagnosis:   [ ] Inadequate Energy Intake [ ]Inadequate Oral Intake [ ] Excessive Energy Intake   [ ] Underweight [ ] Increased Nutrient Needs [ ] Overweight/Obesity   [ ] Altered GI Function [ ] Unintended Weight Loss [ ] Food & Nutrition Related Knowledge Deficit [ X] Malnutrition     Nutrition Diagnosis is [X ] ongoing  [ ] resolved [ ] not applicable     New Nutrition Diagnosis: [ ] not applicable       Interventions:   Recommend  [ ] Change Diet To:  [ ] Nutrition Supplement  [ ] Nutrition Support  [X ] Other: cont POC for now until SLP re-evaluates for possible diet texture upgrade to regular     Monitoring and Evaluation:   [ X] PO intake [ x ] Tolerance to diet prescription [ x ] weights [ x ] labs[ x ] follow up per protocol  [ ] other:

## 2022-07-19 NOTE — PROGRESS NOTE ADULT - SUBJECTIVE AND OBJECTIVE BOX
Patient is a 71y Female with a known history of :  Cellulitis [L03.90]    Acute on chronic osteomyelitis [M86.10]    DM (diabetes mellitus) [E11.9]    HTN (hypertension) [I10]    ESRD on hemodialysis [N18.6]    Prophylactic measure [Z29.9]    Dehiscence of wound [T81.30XA]    Type 2 diabetes mellitus [E11.9]      HPI:  71 year old female with PMH of Afib (not on AC for hx of multiple falls), T2DM, HTN, HLD, ESRD on HD (MWF), CHF, CAD s/p CABG, PAD S/P R-->L bifem-fem BK pop bypass, recent fempop bypass (6/21/2022), and partial ray amputation right great toe (6/22/2022) presented today from [facility] for wound checkup.  Patient states she noticed something wrong with her surgery site a week after since she has started putting weight on it.  She was recently discharged from  in 7/7 to [facility].  c/o pain 2 weeks ago especially if she puts pressure on her right foot.  Denies fever or chills .Admitted for podiatry evaluation and septic workup ,[facility] Palliative care consult requested ,to discuss advance directives and [facility]  06/23/22 1. Right hallux -Acute and chronic osteomyelitis. -Gangrenous skin and soft tissue. 2. Right first metatarsal/clean margin: -Minimal chronic osteomyelitis. Patient was on iv vancomycin with dialysis at [facility] . (14 Jul 2022 19:42)      REVIEW OF SYSTEMS:    CONSTITUTIONAL: No fever, weight loss, or fatigue  EYES: No eye pain, visual disturbances, or discharge  ENMT:  No difficulty hearing, tinnitus, vertigo; No sinus or throat pain  NECK: No pain or stiffness  BREASTS: No pain, masses, or nipple discharge  RESPIRATORY: No cough, wheezing, chills or hemoptysis; No shortness of breath  CARDIOVASCULAR: No chest pain, palpitations, dizziness, or leg swelling  GASTROINTESTINAL: No abdominal or epigastric pain. No nausea, vomiting, or hematemesis; No diarrhea or constipation. No melena or hematochezia.  GENITOURINARY: No dysuria, frequency, hematuria, or incontinence  NEUROLOGICAL: No headaches, memory loss, loss of strength, numbness, or tremors  SKIN: No itching, burning, rashes, or lesions   LYMPH NODES: No enlarged glands  ENDOCRINE: No heat or cold intolerance; No hair loss  MUSCULOSKELETAL: No joint pain or swelling; No muscle, back, or extremity pain  PSYCHIATRIC: No depression, anxiety, mood swings, or difficulty sleeping  HEME/LYMPH: No easy bruising, or bleeding gums  ALLERGY AND IMMUNOLOGIC: No hives or eczema    MEDICATIONS  (STANDING):  aspirin enteric coated 81 milliGRAM(s) Oral daily  atorvastatin 40 milliGRAM(s) Oral at bedtime  calcium acetate 667 milliGRAM(s) Oral three times a day with meals  ciprofloxacin     Tablet 500 milliGRAM(s) Oral daily  cloNIDine 0.1 milliGRAM(s) Oral two times a day  dextrose 5%. 1000 milliLiter(s) (50 mL/Hr) IV Continuous <Continuous>  dextrose 5%. 1000 milliLiter(s) (100 mL/Hr) IV Continuous <Continuous>  dextrose 50% Injectable 25 Gram(s) IV Push once  dextrose 50% Injectable 12.5 Gram(s) IV Push once  dextrose 50% Injectable 25 Gram(s) IV Push once  epoetin fawad-epbx (RETACRIT) Injectable 88654 Unit(s) IV Push <User Schedule>  glucagon  Injectable 1 milliGRAM(s) IntraMuscular once  heparin   Injectable 5000 Unit(s) SubCutaneous every 8 hours  imipramine 50 milliGRAM(s) Oral daily  insulin glargine Injectable (LANTUS) 10 Unit(s) SubCutaneous at bedtime  insulin lispro (ADMELOG) corrective regimen sliding scale   SubCutaneous three times a day before meals  insulin lispro (ADMELOG) corrective regimen sliding scale   SubCutaneous at bedtime  insulin lispro Injectable (ADMELOG) 2 Unit(s) SubCutaneous three times a day before meals  lactobacillus acidophilus 1 Tablet(s) Oral two times a day  metoprolol tartrate 100 milliGRAM(s) Oral every 12 hours  Nephro-elvie 1 Tablet(s) Oral daily  pantoprazole    Tablet 40 milliGRAM(s) Oral before breakfast  risperiDONE   Tablet 0.5 milliGRAM(s) Oral two times a day  senna 2 Tablet(s) Oral at bedtime  zinc sulfate 220 milliGRAM(s) Oral daily    MEDICATIONS  (PRN):  acetaminophen     Tablet .. 650 milliGRAM(s) Oral every 6 hours PRN Temp greater or equal to 38C (100.4F), Mild Pain (1 - 3)  aluminum hydroxide/magnesium hydroxide/simethicone Suspension 30 milliLiter(s) Oral every 4 hours PRN Dyspepsia  bisacodyl Suppository 10 milliGRAM(s) Rectal daily PRN Constipation  dextrose Oral Gel 15 Gram(s) Oral once PRN Blood Glucose LESS THAN 70 milliGRAM(s)/deciliter  melatonin 3 milliGRAM(s) Oral at bedtime PRN Insomnia  ondansetron Injectable 4 milliGRAM(s) IV Push every 8 hours PRN Nausea and/or Vomiting  traMADol 50 milliGRAM(s) Oral three times a day PRN Moderate Pain (4 - 6)      ALLERGIES: latex (Unknown)  No Known Drug Allergies      FAMILY HISTORY:      PHYSICAL EXAMINATION:  -----------------------------  T(C): 36.7 (07-19-22 @ 05:25), Max: 36.8 (07-18-22 @ 20:10)  HR: 71 (07-19-22 @ 05:25) (68 - 77)  BP: 134/65 (07-19-22 @ 05:25) (117/61 - 146/57)  RR: 18 (07-19-22 @ 05:25) (15 - 18)  SpO2: 97% (07-19-22 @ 05:25) (96% - 100%)  Wt(kg): --    07-18 @ 07:01  -  07-19 @ 07:00  --------------------------------------------------------  IN:  Total IN: 0 mL    OUT:    Other (mL): 1500 mL  Total OUT: 1500 mL    Total NET: -1500 mL            VITALS  T(C): 36.7 (07-19-22 @ 05:25), Max: 36.8 (07-18-22 @ 20:10)  HR: 71 (07-19-22 @ 05:25) (68 - 77)  BP: 134/65 (07-19-22 @ 05:25) (117/61 - 146/57)  RR: 18 (07-19-22 @ 05:25) (15 - 18)  SpO2: 97% (07-19-22 @ 05:25) (96% - 100%)    Constitutional: well developed, normal appearance, well groomed, well nourished, no deformities and no acute distress.   Eyes: the conjunctiva exhibited no abnormalities and the eyelids demonstrated no xanthelasmas.   HEENT: normal oral mucosa, no oral pallor and no oral cyanosis.   Neck: normal jugular venous A waves present, normal jugular venous V waves present and no jugular venous wilson A waves.   Pulmonary: no respiratory distress, normal respiratory rhythm and effort, no accessory muscle use and lungs were clear to auscultation bilaterally.   Cardiovascular: heart rate and rhythm were normal, normal S1 and S2 and no murmur, gallop, rub, heave or thrill are present.   Abdomen: soft, non-tender, no hepato-splenomegaly and no abdominal mass palpated.   Musculoskeletal: the gait could not be assessed..   Extremities: no clubbing of the fingernails, no localized cyanosis, no petechial hemorrhages and no ischemic changes.   Skin: normal skin color and pigmentation, no rash, no venous stasis, no skin lesions, no skin ulcer and no xanthoma was observed.   Psychiatric: oriented to person, place, and time, the affect was normal, the mood was normal and not feeling anxious.     LABS:   --------  07-18    133<L>  |  95<L>  |  69<H>  ----------------------------<  389<H>  4.2   |  29  |  5.80<H>    Ca    9.5      18 Jul 2022 14:00                           8.3    11.76 )-----------( 365      ( 18 Jul 2022 14:00 )             26.6                 RADIOLOGY:  -----------------    ECG:     ECHO:

## 2022-07-19 NOTE — PROGRESS NOTE ADULT - PROBLEM SELECTOR PROBLEM 2
Acute on chronic osteomyelitis
DM (diabetes mellitus)
DM (diabetes mellitus)
Acute on chronic osteomyelitis
DM (diabetes mellitus)
Acute on chronic osteomyelitis

## 2022-07-19 NOTE — PROGRESS NOTE ADULT - PROBLEM SELECTOR PLAN 2
ESR 39 and CRP 14  MRSA PCR negative   6/21 s/p Femoral popliteal bypass with prosthetic graft  6/23 s/p R 1st toe ray amputation, Wound culture from OR with MSSA and enterococcus fecalis   Pathology showed OM in margines so need 6 weeks treatment from the start   Was on Unasyn to complete 6 weeks on 7/26.   No leukocytosis or fever on admission   Seen by ID CONS   Recommendations:  - Will follow blood cultures   - Wound culture has been sent x 2 , will follow   - Vascular surgery and podiatry consults   - Based on old cultures will continue zosyn or unasyn until wound culture is back
continue med management
ESR 39 and CRP 14  MRSA PCR negative   6/21 s/p Femoral popliteal bypass with prosthetic graft  6/23 s/p R 1st toe ray amputation, Wound culture from OR with MSSA and enterococcus fecalis   Pathology showed OM in margines so need 6 weeks treatment from the start   Was on Unasyn to complete 6 weeks on 7/26.   No leukocytosis or fever on admission   Seen by ID CONS   Recommendations:  - Will follow blood cultures   - Wound culture has been sent x 2 , will follow   - Vascular surgery and podiatry consults   - Based on old cultures will continue zosyn or unasyn until wound culture is back
continue med management
continue med management
ESR 39 and CRP 14  MRSA PCR negative   6/21 s/p Femoral popliteal bypass with prosthetic graft  6/23 s/p R 1st toe ray amputation, Wound culture from OR with MSSA and enterococcus fecalis   Pathology showed OM in margines so need 6 weeks treatment from the start   Was on Unasyn to complete 6 weeks on 7/26.   No leukocytosis or fever on admission   Seen by ID CONS   Recommendations:  - Will follow blood cultures   - Wound culture has been sent x 2 , will follow   - Vascular surgery and podiatry consults   - Based on old cultures will continue zosyn or unasyn until wound culture is back

## 2022-07-19 NOTE — PROGRESS NOTE ADULT - SUBJECTIVE AND OBJECTIVE BOX
MEDICATIONS  (STANDING):  aspirin enteric coated 81 milliGRAM(s) Oral daily  atorvastatin 40 milliGRAM(s) Oral at bedtime  calcium acetate 667 milliGRAM(s) Oral three times a day with meals  ciprofloxacin     Tablet 500 milliGRAM(s) Oral daily  cloNIDine 0.1 milliGRAM(s) Oral two times a day  dextrose 5%. 1000 milliLiter(s) (50 mL/Hr) IV Continuous <Continuous>  dextrose 5%. 1000 milliLiter(s) (100 mL/Hr) IV Continuous <Continuous>  dextrose 50% Injectable 25 Gram(s) IV Push once  dextrose 50% Injectable 12.5 Gram(s) IV Push once  dextrose 50% Injectable 25 Gram(s) IV Push once  epoetin fawad-epbx (RETACRIT) Injectable 77172 Unit(s) IV Push <User Schedule>  glucagon  Injectable 1 milliGRAM(s) IntraMuscular once  heparin   Injectable 5000 Unit(s) SubCutaneous every 8 hours  imipramine 50 milliGRAM(s) Oral daily  insulin glargine Injectable (LANTUS) 10 Unit(s) SubCutaneous at bedtime  insulin lispro (ADMELOG) corrective regimen sliding scale   SubCutaneous three times a day before meals  insulin lispro (ADMELOG) corrective regimen sliding scale   SubCutaneous at bedtime  insulin lispro Injectable (ADMELOG) 2 Unit(s) SubCutaneous three times a day before meals  lactobacillus acidophilus 1 Tablet(s) Oral two times a day  metoprolol tartrate 100 milliGRAM(s) Oral every 12 hours  Nephro-elvie 1 Tablet(s) Oral daily  pantoprazole    Tablet 40 milliGRAM(s) Oral before breakfast  risperiDONE   Tablet 0.5 milliGRAM(s) Oral two times a day  senna 2 Tablet(s) Oral at bedtime  zinc sulfate 220 milliGRAM(s) Oral daily    MEDICATIONS  (PRN):  acetaminophen     Tablet .. 650 milliGRAM(s) Oral every 6 hours PRN Temp greater or equal to 38C (100.4F), Mild Pain (1 - 3)  aluminum hydroxide/magnesium hydroxide/simethicone Suspension 30 milliLiter(s) Oral every 4 hours PRN Dyspepsia  bisacodyl Suppository 10 milliGRAM(s) Rectal daily PRN Constipation  dextrose Oral Gel 15 Gram(s) Oral once PRN Blood Glucose LESS THAN 70 milliGRAM(s)/deciliter  melatonin 3 milliGRAM(s) Oral at bedtime PRN Insomnia  ondansetron Injectable 4 milliGRAM(s) IV Push every 8 hours PRN Nausea and/or Vomiting  traMADol 50 milliGRAM(s) Oral three times a day PRN Moderate Pain (4 - 6)  71 year old Female seen at [facility] 1EAS 113 D1 for partially dehisced surgical site right 1st ray ulcer. Pt denies any fever, chills, nausea, vomiting, chest pain, shortness of breath, or calf pain at this time.    Allergies    latex (Unknown)  No Known Drug Allergies    Intolerances      Vital Signs Last 24 Hrs  T(C): 36.7 (19 Jul 2022 13:06), Max: 36.8 (18 Jul 2022 20:10)  T(F): 98.1 (19 Jul 2022 13:06), Max: 98.2 (18 Jul 2022 20:10)  HR: 70 (19 Jul 2022 13:06) (70 - 77)  BP: 123/54 (19 Jul 2022 13:06) (123/54 - 146/57)  BP(mean): --  RR: 19 (19 Jul 2022 13:06) (15 - 19)  SpO2: 99% (19 Jul 2022 13:06) (96% - 99%)    Parameters below as of 19 Jul 2022 13:06  Patient On (Oxygen Delivery Method): room air      CBC Full  -  ( 18 Jul 2022 14:00 )  WBC Count : 11.76 K/uL  RBC Count : 2.84 M/uL  Hemoglobin : 8.3 g/dL  Hematocrit : 26.6 %  Platelet Count - Automated : 365 K/uL  Mean Cell Volume : 93.7 fl  Mean Cell Hemoglobin : 29.2 pg  Mean Cell Hemoglobin Concentration : 31.2 gm/dL  Auto Neutrophil # : x  Auto Lymphocyte # : x  Auto Monocyte # : x  Auto Eosinophil # : x  Auto Basophil # : x  Auto Neutrophil % : x  Auto Lymphocyte % : x  Auto Monocyte % : x  Auto Eosinophil % : x  Auto Basophil % : x        PHYSICAL EXAM:  Vascular: DP & PT non-palpable bilaterally, Capillary refill 3 seconds, Digital hair absent bilaterally  Neurological: Light touch sensation diminished bilaterally  Musculoskeletal: 5/5 strength in all quadrants bilaterally, AJ & STJ ROM intact  Dermatological: 3 x 1 cm ulceration noted to the Right foot, 1st ray surgical site wound dehiscence, fibrogranular wound bed, no probe to bone, no periwound erythema, no fluctuance, no malodor, no proximal streaking at this time. Remainder of sutures to the 1st ray intact.                71 year old female with PMH of Afib (not on AC for hx of multiple falls), T2DM, HTN, HLD, ESRD on HD (MWF), CHF, CAD s/p CABG, PAD S/P R-->L bifem-fem BK pop bypass, recent fempop bypass (6/21/2022), and partial ray amputation right great toe (6/22/2022) seen for dehisced surgical site to the right foot, 1st ray      MEDICATIONS  (STANDING):  aspirin enteric coated 81 milliGRAM(s) Oral daily  atorvastatin 40 milliGRAM(s) Oral at bedtime  calcium acetate 667 milliGRAM(s) Oral three times a day with meals  ciprofloxacin     Tablet 500 milliGRAM(s) Oral daily  cloNIDine 0.1 milliGRAM(s) Oral two times a day  dextrose 5%. 1000 milliLiter(s) (50 mL/Hr) IV Continuous <Continuous>  dextrose 5%. 1000 milliLiter(s) (100 mL/Hr) IV Continuous <Continuous>  dextrose 50% Injectable 25 Gram(s) IV Push once  dextrose 50% Injectable 12.5 Gram(s) IV Push once  dextrose 50% Injectable 25 Gram(s) IV Push once  epoetin fawad-epbx (RETACRIT) Injectable 57315 Unit(s) IV Push <User Schedule>  glucagon  Injectable 1 milliGRAM(s) IntraMuscular once  heparin   Injectable 5000 Unit(s) SubCutaneous every 8 hours  imipramine 50 milliGRAM(s) Oral daily  insulin glargine Injectable (LANTUS) 10 Unit(s) SubCutaneous at bedtime  insulin lispro (ADMELOG) corrective regimen sliding scale   SubCutaneous three times a day before meals  insulin lispro (ADMELOG) corrective regimen sliding scale   SubCutaneous at bedtime  insulin lispro Injectable (ADMELOG) 2 Unit(s) SubCutaneous three times a day before meals  lactobacillus acidophilus 1 Tablet(s) Oral two times a day  metoprolol tartrate 100 milliGRAM(s) Oral every 12 hours  Nephro-elvie 1 Tablet(s) Oral daily  pantoprazole    Tablet 40 milliGRAM(s) Oral before breakfast  risperiDONE   Tablet 0.5 milliGRAM(s) Oral two times a day  senna 2 Tablet(s) Oral at bedtime  zinc sulfate 220 milliGRAM(s) Oral daily    MEDICATIONS  (PRN):  acetaminophen     Tablet .. 650 milliGRAM(s) Oral every 6 hours PRN Temp greater or equal to 38C (100.4F), Mild Pain (1 - 3)  aluminum hydroxide/magnesium hydroxide/simethicone Suspension 30 milliLiter(s) Oral every 4 hours PRN Dyspepsia  bisacodyl Suppository 10 milliGRAM(s) Rectal daily PRN Constipation  dextrose Oral Gel 15 Gram(s) Oral once PRN Blood Glucose LESS THAN 70 milliGRAM(s)/deciliter  melatonin 3 milliGRAM(s) Oral at bedtime PRN Insomnia  ondansetron Injectable 4 milliGRAM(s) IV Push every 8 hours PRN Nausea and/or Vomiting  traMADol 50 milliGRAM(s) Oral three times a day PRN Moderate Pain (4 - 6)  71 year old Female seen at [facility] 1EAS 113 D1 for partially dehisced surgical site right 1st ray ulcer. Pt denies any fever, chills, nausea, vomiting, chest pain, shortness of breath, or calf pain at this time.    Allergies    latex (Unknown)  No Known Drug Allergies    Intolerances      Vital Signs Last 24 Hrs  T(C): 36.7 (19 Jul 2022 13:06), Max: 36.8 (18 Jul 2022 20:10)  T(F): 98.1 (19 Jul 2022 13:06), Max: 98.2 (18 Jul 2022 20:10)  HR: 70 (19 Jul 2022 13:06) (70 - 77)  BP: 123/54 (19 Jul 2022 13:06) (123/54 - 146/57)  BP(mean): --  RR: 19 (19 Jul 2022 13:06) (15 - 19)  SpO2: 99% (19 Jul 2022 13:06) (96% - 99%)    Parameters below as of 19 Jul 2022 13:06  Patient On (Oxygen Delivery Method): room air      CBC Full  -  ( 18 Jul 2022 14:00 )  WBC Count : 11.76 K/uL  RBC Count : 2.84 M/uL  Hemoglobin : 8.3 g/dL  Hematocrit : 26.6 %  Platelet Count - Automated : 365 K/uL  Mean Cell Volume : 93.7 fl  Mean Cell Hemoglobin : 29.2 pg  Mean Cell Hemoglobin Concentration : 31.2 gm/dL  Auto Neutrophil # : x  Auto Lymphocyte # : x  Auto Monocyte # : x  Auto Eosinophil # : x  Auto Basophil # : x  Auto Neutrophil % : x  Auto Lymphocyte % : x  Auto Monocyte % : x  Auto Eosinophil % : x  Auto Basophil % : x        PHYSICAL EXAM:  Vascular: DP & PT non-palpable bilaterally, Capillary refill 3 seconds, Digital hair absent bilaterally  Neurological: Light touch sensation diminished bilaterally  Musculoskeletal: 5/5 strength in all quadrants bilaterally, AJ & STJ ROM intact  Dermatological: 3 x 1 cm ulceration noted to the Right foot, 1st ray surgical site wound dehiscence, fibrogranular wound bed, no probe to bone, no periwound erythema, no fluctuance, no malodor, no proximal streaking at this time. Remainder of sutures to the 1st ray intact.

## 2022-07-19 NOTE — DISCHARGE NOTE PROVIDER - NSDCCAREPROVSEEN_GEN_ALL_CORE_FT
Jacob, Joseluis Green, Adilene Barahona, Justine Long, Geovany Parisi, Tanmay Christensen, Dorothy Perlman, Trevin CASTELLON

## 2022-07-20 LAB
-  AMIKACIN: SIGNIFICANT CHANGE UP
-  AMOXICILLIN/CLAVULANIC ACID: SIGNIFICANT CHANGE UP
-  AMPICILLIN/SULBACTAM: SIGNIFICANT CHANGE UP
-  AMPICILLIN: SIGNIFICANT CHANGE UP
-  AZTREONAM: SIGNIFICANT CHANGE UP
-  CEFAZOLIN: SIGNIFICANT CHANGE UP
-  CEFEPIME: SIGNIFICANT CHANGE UP
-  CEFOXITIN: SIGNIFICANT CHANGE UP
-  CEFTAZIDIME/AVIBACTAM: SIGNIFICANT CHANGE UP
-  CEFTOLOZANE/TAZOBACTAM: SIGNIFICANT CHANGE UP
-  CEFTRIAXONE: SIGNIFICANT CHANGE UP
-  CIPROFLOXACIN: SIGNIFICANT CHANGE UP
-  ERTAPENEM: SIGNIFICANT CHANGE UP
-  GENTAMICIN: SIGNIFICANT CHANGE UP
-  IMIPENEM: SIGNIFICANT CHANGE UP
-  LEVOFLOXACIN: SIGNIFICANT CHANGE UP
-  MEROPENEM: SIGNIFICANT CHANGE UP
-  PIPERACILLIN/TAZOBACTAM: SIGNIFICANT CHANGE UP
-  TOBRAMYCIN: SIGNIFICANT CHANGE UP
-  TRIMETHOPRIM/SULFAMETHOXAZOLE: SIGNIFICANT CHANGE UP
CULTURE RESULTS: SIGNIFICANT CHANGE UP
METHOD TYPE: SIGNIFICANT CHANGE UP
ORGANISM # SPEC MICROSCOPIC CNT: SIGNIFICANT CHANGE UP
SPECIMEN SOURCE: SIGNIFICANT CHANGE UP

## 2022-07-20 NOTE — ED ADULT TRIAGE NOTE - HEIGHT IN FEET
5 Prednisone Pregnancy And Lactation Text: This medication is Pregnancy Category C and it isn't know if it is safe during pregnancy. This medication is excreted in breast milk.

## 2022-07-22 LAB — GAD65 AB SER-MCNC: 0 NMOL/L — SIGNIFICANT CHANGE UP

## 2022-07-22 NOTE — ED ADULT NURSE REASSESSMENT NOTE - NS ED NURSE REASSESS COMMENT FT1
result from core lab- culture specimen from 7/14/2022- positive for numerous stenotrophomonas maltophilia, moderate enterobacter cloacae complex, moderate enterobacter clocae carbopenum resistant, normal kera isolated- as said by domi leslie- core lab result from core lab- culture specimen from 7/14/2022- positive for numerous stenotrophomonas maltophilia, moderate enterobacter cloacae complex, moderate enterobacter clocae carbopenum resistant, normal kera isolated- as said by domi leslie- core lab  1545 hrs- spoke to Dr Larson, will follow up will follow up with  the result

## 2022-07-25 PROBLEM — R27.0 ATAXIA, UNSPECIFIED: Chronic | Status: ACTIVE | Noted: 2022-07-14

## 2022-07-25 PROBLEM — N18.6 END STAGE RENAL DISEASE: Chronic | Status: ACTIVE | Noted: 2022-07-14
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
## 2022-07-29 ENCOUNTER — APPOINTMENT (OUTPATIENT)
Dept: WOUND CARE | Facility: HOSPITAL | Age: 72
End: 2022-07-29

## 2022-07-29 ENCOUNTER — OUTPATIENT (OUTPATIENT)
Dept: OUTPATIENT SERVICES | Facility: HOSPITAL | Age: 72
LOS: 1 days | Discharge: HOME | End: 2022-07-29
Payer: MEDICARE

## 2022-07-29 ENCOUNTER — APPOINTMENT (OUTPATIENT)
Dept: WOUND CARE | Facility: HOSPITAL | Age: 72
End: 2022-07-29
Payer: MEDICARE

## 2022-07-29 VITALS
HEIGHT: 69 IN | DIASTOLIC BLOOD PRESSURE: 66 MMHG | WEIGHT: 140 LBS | OXYGEN SATURATION: 99 % | HEART RATE: 60 BPM | RESPIRATION RATE: 18 BRPM | BODY MASS INDEX: 20.73 KG/M2 | TEMPERATURE: 98.3 F | SYSTOLIC BLOOD PRESSURE: 120 MMHG

## 2022-07-29 DIAGNOSIS — I48.91 UNSPECIFIED ATRIAL FIBRILLATION: ICD-10-CM

## 2022-07-29 DIAGNOSIS — L97.309 TYPE 2 DIABETES MELLITUS WITH OTHER SKIN ULCER: ICD-10-CM

## 2022-07-29 DIAGNOSIS — Z95.1 PRESENCE OF AORTOCORONARY BYPASS GRAFT: ICD-10-CM

## 2022-07-29 DIAGNOSIS — Z86.59 PERSONAL HISTORY OF OTHER MENTAL AND BEHAVIORAL DISORDERS: ICD-10-CM

## 2022-07-29 DIAGNOSIS — L97.324: ICD-10-CM

## 2022-07-29 DIAGNOSIS — Z87.19 PERSONAL HISTORY OF OTHER DISEASES OF THE DIGESTIVE SYSTEM: ICD-10-CM

## 2022-07-29 DIAGNOSIS — Z89.411 ACQUIRED ABSENCE OF RIGHT GREAT TOE: Chronic | ICD-10-CM

## 2022-07-29 DIAGNOSIS — S91.309A UNSPECIFIED OPEN WOUND, UNSPECIFIED FOOT, INITIAL ENCOUNTER: ICD-10-CM

## 2022-07-29 DIAGNOSIS — E78.5 HYPERLIPIDEMIA, UNSPECIFIED: ICD-10-CM

## 2022-07-29 DIAGNOSIS — Z86.39 PERSONAL HISTORY OF OTHER ENDOCRINE, NUTRITIONAL AND METABOLIC DISEASE: ICD-10-CM

## 2022-07-29 DIAGNOSIS — E11.622 TYPE 2 DIABETES MELLITUS WITH OTHER SKIN ULCER: ICD-10-CM

## 2022-07-29 DIAGNOSIS — I50.9 HEART FAILURE, UNSPECIFIED: ICD-10-CM

## 2022-07-29 PROCEDURE — 99213 OFFICE O/P EST LOW 20 MIN: CPT

## 2022-07-29 PROCEDURE — 99024 POSTOP FOLLOW-UP VISIT: CPT

## 2022-07-29 PROCEDURE — G0463: CPT

## 2022-07-29 RX ORDER — TRAMADOL HYDROCHLORIDE 50 MG/1
50 TABLET, COATED ORAL
Qty: 15 | Refills: 0 | Status: DISCONTINUED | COMMUNITY
Start: 2022-04-12

## 2022-07-29 RX ORDER — METOPROLOL TARTRATE 50 MG/1
50 TABLET, FILM COATED ORAL
Refills: 0 | Status: ACTIVE | COMMUNITY

## 2022-07-29 RX ORDER — INSULIN DEGLUDEC INJECTION 100 U/ML
INJECTION, SOLUTION SUBCUTANEOUS AT BEDTIME
Refills: 0 | Status: DISCONTINUED | COMMUNITY
End: 2022-07-29

## 2022-07-29 RX ORDER — NUT.TX.GLUCOSE INTOLERANCE,SOY
BAR ORAL
Refills: 0 | Status: ACTIVE | COMMUNITY

## 2022-07-29 RX ORDER — ZOLPIDEM TARTRATE 10 MG/1
10 TABLET ORAL
Refills: 0 | Status: DISCONTINUED | COMMUNITY
End: 2022-07-29

## 2022-07-29 RX ORDER — INSULIN GLARGINE 100 [IU]/ML
100 INJECTION, SOLUTION SUBCUTANEOUS
Qty: 15 | Refills: 0 | Status: ACTIVE | COMMUNITY
Start: 2022-06-03

## 2022-07-29 RX ORDER — MELATONIN 3 MG
3 CAPSULE ORAL
Refills: 0 | Status: ACTIVE | COMMUNITY

## 2022-07-29 RX ORDER — SODIUM POLYSTYRENE SULFONATE 4.1 MEQ/G
POWDER, FOR SUSPENSION ORAL; RECTAL
Qty: 120 | Refills: 0 | Status: DISCONTINUED | COMMUNITY
Start: 2022-03-18

## 2022-07-29 RX ORDER — TRAMADOL HYDROCHLORIDE 50 MG/1
50 TABLET, COATED ORAL
Refills: 0 | Status: ACTIVE | COMMUNITY

## 2022-07-29 RX ORDER — METOPROLOL TARTRATE 100 MG/1
100 TABLET, FILM COATED ORAL
Qty: 60 | Refills: 0 | Status: DISCONTINUED | COMMUNITY
Start: 2022-06-03

## 2022-07-29 RX ORDER — DILTIAZEM HYDROCHLORIDE 60 MG/1
60 TABLET ORAL
Qty: 90 | Refills: 0 | Status: DISCONTINUED | COMMUNITY
Start: 2022-06-03

## 2022-07-29 RX ORDER — PEN NEEDLE, DIABETIC 29 G X1/2"
31G X 5 MM NEEDLE, DISPOSABLE MISCELLANEOUS
Qty: 4 | Refills: 0 | Status: DISCONTINUED | COMMUNITY
Start: 2022-06-03

## 2022-07-29 RX ORDER — CLOPIDOGREL BISULFATE 75 MG/1
75 TABLET, FILM COATED ORAL DAILY
Refills: 0 | Status: DISCONTINUED | COMMUNITY
End: 2022-07-29

## 2022-07-29 RX ORDER — RISPERIDONE 0.5 MG/1
0.5 TABLET, FILM COATED ORAL
Refills: 0 | Status: ACTIVE | COMMUNITY

## 2022-07-29 RX ORDER — PANTOPRAZOLE SODIUM 40 MG/1
40 GRANULE, DELAYED RELEASE ORAL DAILY
Refills: 0 | Status: DISCONTINUED | COMMUNITY
End: 2022-07-29

## 2022-07-29 RX ORDER — INSULIN ASPART INJECTION 100 [IU]/ML
INJECTION, SOLUTION SUBCUTANEOUS
Refills: 0 | Status: DISCONTINUED | COMMUNITY
End: 2022-07-29

## 2022-07-29 RX ORDER — CLONIDINE 0.1 MG/24H
0.1 PATCH, EXTENDED RELEASE TRANSDERMAL
Refills: 0 | Status: DISCONTINUED | COMMUNITY
End: 2022-07-29

## 2022-07-29 RX ORDER — CLONIDINE HYDROCHLORIDE 0.1 MG/1
0.1 TABLET ORAL
Qty: 60 | Refills: 0 | Status: ACTIVE | COMMUNITY
Start: 2022-06-03

## 2022-07-29 RX ORDER — INSULIN LISPRO 100 U/ML
100 INJECTION, SOLUTION INTRAVENOUS; SUBCUTANEOUS
Refills: 0 | Status: ACTIVE | COMMUNITY

## 2022-07-29 RX ORDER — CALCIUM ACETATE 667 MG/1
667 CAPSULE ORAL
Qty: 90 | Refills: 0 | Status: DISCONTINUED | COMMUNITY
Start: 2022-06-03

## 2022-07-29 RX ORDER — COLD-HOT PACK
EACH MISCELLANEOUS
Refills: 0 | Status: ACTIVE | COMMUNITY

## 2022-07-29 RX ORDER — HEPARIN SODIUM 5000 [USP'U]/.5ML
5000 INJECTION, SOLUTION INTRAVENOUS; SUBCUTANEOUS
Refills: 0 | Status: ACTIVE | COMMUNITY

## 2022-07-29 RX ORDER — PANTOPRAZOLE 40 MG/1
40 TABLET, DELAYED RELEASE ORAL
Qty: 30 | Refills: 0 | Status: ACTIVE | COMMUNITY
Start: 2022-06-03

## 2022-07-29 RX ORDER — SENNOSIDES 8.6 MG TABLETS 8.6 MG/1
8.6 TABLET ORAL
Refills: 0 | Status: ACTIVE | COMMUNITY

## 2022-07-29 RX ORDER — METOPROLOL SUCCINATE 50 MG/1
50 TABLET, EXTENDED RELEASE ORAL
Qty: 90 | Refills: 0 | Status: DISCONTINUED | COMMUNITY
Start: 2022-03-28

## 2022-07-29 RX ORDER — SEVELAMER HYDROCHLORIDE 800 MG/1
800 TABLET, FILM COATED ORAL
Refills: 0 | Status: DISCONTINUED | COMMUNITY
End: 2022-07-29

## 2022-07-29 RX ORDER — AMLODIPINE BESYLATE 5 MG/1
5 TABLET ORAL DAILY
Refills: 0 | Status: DISCONTINUED | COMMUNITY
End: 2022-07-29

## 2022-07-29 RX ORDER — PEN NEEDLE, DIABETIC 32GX 5/32"
32G X 4 MM NEEDLE, DISPOSABLE MISCELLANEOUS
Qty: 200 | Refills: 0 | Status: DISCONTINUED | COMMUNITY
Start: 2021-10-04

## 2022-07-29 RX ORDER — FOLIC ACID/VIT B COMPLEX AND C 0.8 MG
TABLET ORAL
Qty: 30 | Refills: 0 | Status: DISCONTINUED | COMMUNITY
Start: 2022-06-03

## 2022-07-29 RX ORDER — APIXABAN 2.5 MG/1
2.5 TABLET, FILM COATED ORAL
Qty: 30 | Refills: 0 | Status: DISCONTINUED | COMMUNITY
Start: 2022-04-12

## 2022-07-29 RX ORDER — LIDOCAINE AND PRILOCAINE 25; 25 MG/G; MG/G
2.5-2.5 CREAM TOPICAL
Qty: 30 | Refills: 0 | Status: DISCONTINUED | COMMUNITY
Start: 2021-08-17

## 2022-07-29 RX ORDER — INSULIN DEGLUDEC INJECTION 100 U/ML
100 INJECTION, SOLUTION SUBCUTANEOUS
Qty: 15 | Refills: 0 | Status: DISCONTINUED | COMMUNITY
Start: 2021-12-20

## 2022-07-29 RX ORDER — VIT B COMP NO.3/FOLIC/C/BIOTIN 1 MG-60 MG
1 TABLET ORAL
Qty: 90 | Refills: 0 | Status: DISCONTINUED | COMMUNITY
Start: 2021-09-14

## 2022-07-29 RX ORDER — INSULIN GLARGINE-YFGN 100 [IU]/ML
100 INJECTION, SOLUTION SUBCUTANEOUS
Refills: 0 | Status: ACTIVE | COMMUNITY

## 2022-08-02 NOTE — REVIEW OF SYSTEMS
[Joint Stiffness] : joint stiffness [Skin Wound] : skin wound [Fever] : no fever [Chills] : no chills [Eye Pain] : no eye pain [Loss Of Hearing] : no hearing loss [Shortness Of Breath] : no shortness of breath [Abdominal Pain] : no abdominal pain [Anxiety] : no anxiety [Easy Bleeding] : no tendency for easy bleeding [FreeTextEntry5] : HTN, HLD , PVD ( patient has had previous vascular interventions ) severe diabetic small vessel disease  [FreeTextEntry6] : Hx of long term smoking  [de-identified] : DFU 3 medial left ankle , probes to bone , DFU 2 distal hallux and 2nd toe skin, subcu , fat  [de-identified] : IDDM with neuropathy  [de-identified] : PJ

## 2022-08-02 NOTE — ASSESSMENT
[Verbal] : Verbal [Written] : Written [Demo] : Demo [Patient] : Patient [Fair - mild discomfort, physical impairment, low acceptance] : Fair - mild discomfort, physical impairment, low acceptance [Needs reinforcement] : needs reinforcement [Dressing changes] : dressing changes [Skin Care] : skin care [Signs and symptoms of infection] : sign and symptoms of infection [Surgery] : surgery [Nutrition] : nutrition [Arterial Disease] : arterial disease [How and When to Call] : how and when to call [Pain Management] : pain management [Patient responsibility to plan of care] : patient responsibility to plan of care [Glycemic Control] : glycemic control [] : Yes [Stable] : stable [LTC] : LTC [Wheelchair] : Wheelchair [Not Applicable - Long Term Care/Home Health Agency] : Long Term Care/Home Health Agency: Not Applicable [FreeTextEntry2] : Infection prevention\par Localized wound care \par Goal remaining pain free regarding wounds\par Promote optimal nutrition  [FreeTextEntry4] : Follow up in 1 week \par * See Dr. Duffy note for more details \par

## 2022-08-02 NOTE — PHYSICAL EXAM
[Normal Thyroid] : the thyroid was normal [Normal Breath Sounds] : Normal breath sounds [Normal Heart Sounds] : normal heart sounds [Normal Rate and Rhythm] : normal rate and rhythm [Alert] : alert [Oriented to Person] : oriented to person [Oriented to Place] : oriented to place [Oriented to Time] : oriented to time [Calm] : calm [Abdominal Pad] : Abdominal Pad [4 x 4] : 4 x 4  [JVD] : no jugular venous distention  [Ankle Swelling (On Exam)] : not present [] : not present [Abdomen Masses] : No abdominal massess [Abdomen Tenderness] : ~T ~M No abdominal tenderness [Tender] : nontender [Enlarged] : not enlarged [de-identified] : elderly WF, NAD, alert,Ox3. [FreeTextEntry1] : Right foot hallux, 1st Met head Amp - See Dr. Duffy note  [FreeTextEntry7] : Right medial leg - Fem pop surgical site(6/21/22)  [FreeTextEntry8] : 0.2 [FreeTextEntry9] : 0.7 [de-identified] : 0.2 [de-identified] : Serous [de-identified] : Silver alginate [de-identified] : Mechanically cleansed with sterile gauze and normal saline.\par Cloth tape  [de-identified] : Right lateral leg - dry eschar  [de-identified] : 1 [de-identified] : 2 [de-identified] : 0.1 [de-identified] : scant Serous/sanguinous [de-identified] : Dry dressing  [de-identified] : Mechanically cleansed with sterile gauze and normal saline.\par Cloth tape  [de-identified] : Dorsalis Pedis: 0\par Posterior Tibialis: 0\par Doppler pulses:  Present. \par Extremity color: normal \par Extremity temperature: cool \par Capillary refill: < 3 sec\par \par  [de-identified] : Moderate [de-identified] : Normal [de-identified] : None [de-identified] : None [de-identified] : >75% [de-identified] : No [de-identified] : 3x Weekly [de-identified] : Primary Dressing [de-identified] : Normal [de-identified] : None [de-identified] : None [de-identified] : No [de-identified] : 3x Weekly [de-identified] : Primary Dressing

## 2022-08-02 NOTE — HISTORY OF PRESENT ILLNESS
[FreeTextEntry1] : 72 yo WF, here for f/u following a right fem-pop bypass with Dr. Slim coles. Pt with a small wound dehiscence along with a right lateral knee wound. Clear fluid oozing out of the surgical incision. No signs of cellulitis/erythema/edema.

## 2022-08-03 DIAGNOSIS — L97.512 NON-PRESSURE CHRONIC ULCER OF OTHER PART OF RIGHT FOOT WITH FAT LAYER EXPOSED: ICD-10-CM

## 2022-08-03 DIAGNOSIS — Z87.891 PERSONAL HISTORY OF NICOTINE DEPENDENCE: ICD-10-CM

## 2022-08-03 DIAGNOSIS — Z79.899 OTHER LONG TERM (CURRENT) DRUG THERAPY: ICD-10-CM

## 2022-08-03 DIAGNOSIS — N19 UNSPECIFIED KIDNEY FAILURE: ICD-10-CM

## 2022-08-03 DIAGNOSIS — L97.322 NON-PRESSURE CHRONIC ULCER OF LEFT ANKLE WITH FAT LAYER EXPOSED: ICD-10-CM

## 2022-08-03 DIAGNOSIS — E11.622 TYPE 2 DIABETES MELLITUS WITH OTHER SKIN ULCER: ICD-10-CM

## 2022-08-03 DIAGNOSIS — Z82.0 FAMILY HISTORY OF EPILEPSY AND OTHER DISEASES OF THE NERVOUS SYSTEM: ICD-10-CM

## 2022-08-03 DIAGNOSIS — E11.40 TYPE 2 DIABETES MELLITUS WITH DIABETIC NEUROPATHY, UNSPECIFIED: ICD-10-CM
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
## 2022-08-03 DIAGNOSIS — Z82.49 FAMILY HISTORY OF ISCHEMIC HEART DISEASE AND OTHER DISEASES OF THE CIRCULATORY SYSTEM: ICD-10-CM
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
## 2022-08-03 DIAGNOSIS — Z89.411 ACQUIRED ABSENCE OF RIGHT GREAT TOE: ICD-10-CM

## 2022-08-03 DIAGNOSIS — E11.621 TYPE 2 DIABETES MELLITUS WITH FOOT ULCER: ICD-10-CM

## 2022-08-03 DIAGNOSIS — Z95.5 PRESENCE OF CORONARY ANGIOPLASTY IMPLANT AND GRAFT: ICD-10-CM

## 2022-08-03 DIAGNOSIS — T81.31XD DISRUPTION OF EXTERNAL OPERATION (SURGICAL) WOUND, NOT ELSEWHERE CLASSIFIED, SUBSEQUENT ENCOUNTER: ICD-10-CM

## 2022-08-03 DIAGNOSIS — E11.51 TYPE 2 DIABETES MELLITUS WITH DIABETIC PERIPHERAL ANGIOPATHY WITHOUT GANGRENE: ICD-10-CM

## 2022-08-03 DIAGNOSIS — Z91.040 LATEX ALLERGY STATUS: ICD-10-CM

## 2022-08-03 DIAGNOSIS — Z95.828 PRESENCE OF OTHER VASCULAR IMPLANTS AND GRAFTS: ICD-10-CM

## 2022-08-03 DIAGNOSIS — I10 ESSENTIAL (PRIMARY) HYPERTENSION: ICD-10-CM

## 2022-08-03 PROBLEM — L97.324: Status: ACTIVE | Noted: 2022-04-01

## 2022-08-03 NOTE — REVIEW OF SYSTEMS
[Joint Stiffness] : joint stiffness [Skin Wound] : skin wound [Fever] : no fever [Chills] : no chills [Eye Pain] : no eye pain [Loss Of Hearing] : no hearing loss [Shortness Of Breath] : no shortness of breath [Abdominal Pain] : no abdominal pain [Anxiety] : no anxiety [Easy Bleeding] : no tendency for easy bleeding [FreeTextEntry5] : HTN, HLD , PVD ( patient has had previous vascular interventions ) severe diabetic small vessel disease  [FreeTextEntry6] : Hx of long term smoking  [de-identified] : Right foot - Fibrotic wound base at the surgical site with dehiscence from the distal to proximal surgical site [de-identified] : IDDM with neuropathy  [de-identified] : PJ

## 2022-08-03 NOTE — PHYSICAL EXAM
[4 x 4] : 4 x 4  [Abdominal Pad] : Abdominal Pad [0] : left 0 [Skin Ulcer] : ulcer [Alert] : alert [Oriented to Person] : oriented to person [Oriented to Place] : oriented to place [Calm] : calm [Varicose Veins Of Lower Extremities] : not present [Purpura] : no purpura  [Petechiae] : no petechiae [de-identified] : calm [de-identified] : HTN, HLD , diabetic micro vascular disease and PVD  [de-identified] : hammer toes, s/p hallux amputation and partial 1st metatarsal resection  [de-identified] : Right foot - surgical site wound base fibrotic with dehiscence and mild sersanguinos drainage, (-) probe to bone  [FreeTextEntry1] : Right foot hallux , 1st Met head amp site (6/23/22) - sutures dehiscing  [FreeTextEntry2] : 4.5 [FreeTextEntry3] : 1.5 [FreeTextEntry4] : 1.5 [de-identified] : Serous/sanguinous [de-identified] : 100% [de-identified] : Silver alginate [de-identified] : Mechanically cleansed with sterile gauze and normal saline.\par Kerlix  [FreeTextEntry7] : Right lateral leg - See Dr. Johnson note  [de-identified] : Right medial leg - See Dr. Johnson note  [de-identified] : Dorsalis Pedis: 0\par Posterior Tibialis: 0\par Doppler pulses:  Present. \par Extremity color: normal \par Extremity temperature: cool \par Capillary refill: < 3 sec\par \par  [TWNoteComboBox4] : Moderate [de-identified] : Normal [de-identified] : None [de-identified] : None [de-identified] : None [de-identified] : Yes [de-identified] : 3x Weekly [de-identified] : Primary Dressing

## 2022-08-03 NOTE — ASSESSMENT
[Stable] : stable [LTC] : LTC [Wheelchair] : Wheelchair [Not Applicable - Long Term Care/Home Health Agency] : Long Term Care/Home Health Agency: Not Applicable [Verbal] : Verbal [Written] : Written [Demo] : Demo [Patient] : Patient [Fair - mild discomfort, physical impairment, low acceptance] : Fair - mild discomfort, physical impairment, low acceptance [Needs reinforcement] : needs reinforcement [Dressing changes] : dressing changes [Foot Care] : foot care [Skin Care] : skin care [Signs and symptoms of infection] : sign and symptoms of infection [Surgery] : surgery [Nutrition] : nutrition [Arterial Disease] : arterial disease [How and When to Call] : how and when to call [Pain Management] : pain management [Off-loading] : off-loading [Patient responsibility to plan of care] : patient responsibility to plan of care [Glycemic Control] : glycemic control [] : Yes [FreeTextEntry2] : Infection prevention\par Localized wound care \par Goal remaining pain free regarding wounds\par Low glycemic diet \par Offloading  [FreeTextEntry4] : auth submitted for HBOT. Explained the benefits of HBO. Pt states she wants to think about it. \par Auth submitted for debridement. \par Pt was followed by vascular while admitted in hospital. \par Supply scrips sent with patient for Rehab facility \par Vascular consult submitted \par Follow up in 1 week

## 2022-08-03 NOTE — HISTORY OF PRESENT ILLNESS
[FreeTextEntry1] : Pt is a 71 year old female and is a poor historian. Patient resides at [facility] in [place]. She was admitted to [facility] from 7/14/22 - 7/19/22. She received right hallux and 1st Met head amp 6/23/22 by Dr. Pastor and right lower extremity Fem Pop 6/21/22 by Dr. Miller. Pt is here for post op visit.

## 2022-08-03 NOTE — PLAN
[FreeTextEntry1] : Patient was seen and examined.  Removed sutures from the surgical site , wound dehiscence noted. Discussed with pt to stay partial weight bearing to the right heel in sx shoe. Discussed with pt the wound can get progressively worse, needs frequent dressing changes. Pt to follow up in 1 week and perform local wound care.  Spent 20 minutes for patient care and medical decision making.\par

## 2022-09-12 ENCOUNTER — EMERGENCY (EMERGENCY)
Facility: HOSPITAL | Age: 72
LOS: 1 days | Discharge: ANOTHER HOSPITAL | End: 2022-09-12
Attending: EMERGENCY MEDICINE | Admitting: EMERGENCY MEDICINE
Payer: MEDICARE

## 2022-09-12 ENCOUNTER — INPATIENT (INPATIENT)
Facility: HOSPITAL | Age: 72
LOS: 4 days | Discharge: NURSING, REHAB OR LONG-TERM CARE | DRG: 291 | End: 2022-09-17
Attending: INTERNAL MEDICINE | Admitting: INTERNAL MEDICINE
Payer: MEDICARE

## 2022-09-12 VITALS
SYSTOLIC BLOOD PRESSURE: 129 MMHG | TEMPERATURE: 98 F | HEART RATE: 78 BPM | DIASTOLIC BLOOD PRESSURE: 68 MMHG | RESPIRATION RATE: 18 BRPM | OXYGEN SATURATION: 99 % | HEIGHT: 69 IN | WEIGHT: 126.1 LBS

## 2022-09-12 VITALS
SYSTOLIC BLOOD PRESSURE: 162 MMHG | TEMPERATURE: 97 F | HEART RATE: 68 BPM | DIASTOLIC BLOOD PRESSURE: 71 MMHG | OXYGEN SATURATION: 98 % | RESPIRATION RATE: 18 BRPM

## 2022-09-12 VITALS
HEART RATE: 72 BPM | SYSTOLIC BLOOD PRESSURE: 150 MMHG | RESPIRATION RATE: 14 BRPM | OXYGEN SATURATION: 97 % | TEMPERATURE: 98 F | DIASTOLIC BLOOD PRESSURE: 70 MMHG | HEIGHT: 69 IN | WEIGHT: 125 LBS
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
## 2022-09-12 DIAGNOSIS — I73.9 PERIPHERAL VASCULAR DISEASE, UNSPECIFIED: ICD-10-CM

## 2022-09-12 DIAGNOSIS — E11.9 TYPE 2 DIABETES MELLITUS WITHOUT COMPLICATIONS: ICD-10-CM

## 2022-09-12 DIAGNOSIS — Z29.9 ENCOUNTER FOR PROPHYLACTIC MEASURES, UNSPECIFIED: ICD-10-CM

## 2022-09-12 DIAGNOSIS — N18.6 END STAGE RENAL DISEASE: ICD-10-CM

## 2022-09-12 DIAGNOSIS — Z89.411 ACQUIRED ABSENCE OF RIGHT GREAT TOE: Chronic | ICD-10-CM

## 2022-09-12 DIAGNOSIS — M86.10 OTHER ACUTE OSTEOMYELITIS, UNSPECIFIED SITE: ICD-10-CM

## 2022-09-12 DIAGNOSIS — R53.1 WEAKNESS: ICD-10-CM

## 2022-09-12 LAB
ALBUMIN SERPL ELPH-MCNC: 3.3 G/DL — SIGNIFICANT CHANGE UP (ref 3.3–5)
ALP SERPL-CCNC: 116 U/L — SIGNIFICANT CHANGE UP (ref 30–120)
ALT FLD-CCNC: 21 U/L DA — SIGNIFICANT CHANGE UP (ref 10–60)
ANION GAP SERPL CALC-SCNC: 9 MMOL/L — SIGNIFICANT CHANGE UP (ref 5–17)
AST SERPL-CCNC: 16 U/L — SIGNIFICANT CHANGE UP (ref 10–40)
BASOPHILS # BLD AUTO: 0.07 K/UL — SIGNIFICANT CHANGE UP (ref 0–0.2)
BASOPHILS NFR BLD AUTO: 0.7 % — SIGNIFICANT CHANGE UP (ref 0–2)
BILIRUB SERPL-MCNC: 0.3 MG/DL — SIGNIFICANT CHANGE UP (ref 0.2–1.2)
BUN SERPL-MCNC: 101 MG/DL — HIGH (ref 7–23)
CALCIUM SERPL-MCNC: 10 MG/DL — SIGNIFICANT CHANGE UP (ref 8.4–10.5)
CHLORIDE SERPL-SCNC: 94 MMOL/L — LOW (ref 96–108)
CO2 SERPL-SCNC: 32 MMOL/L — HIGH (ref 22–31)
CREAT SERPL-MCNC: 7.52 MG/DL — HIGH (ref 0.5–1.3)
EGFR: 5 ML/MIN/1.73M2 — LOW
EOSINOPHIL # BLD AUTO: 0.77 K/UL — HIGH (ref 0–0.5)
EOSINOPHIL NFR BLD AUTO: 7.2 % — HIGH (ref 0–6)
GLUCOSE SERPL-MCNC: 187 MG/DL — HIGH (ref 70–99)
HCT VFR BLD CALC: 29 % — LOW (ref 34.5–45)
HGB BLD-MCNC: 9.3 G/DL — LOW (ref 11.5–15.5)
IMM GRANULOCYTES NFR BLD AUTO: 0.5 % — SIGNIFICANT CHANGE UP (ref 0–1.5)
LYMPHOCYTES # BLD AUTO: 1.28 K/UL — SIGNIFICANT CHANGE UP (ref 1–3.3)
LYMPHOCYTES # BLD AUTO: 12 % — LOW (ref 13–44)
MCHC RBC-ENTMCNC: 30.4 PG — SIGNIFICANT CHANGE UP (ref 27–34)
MCHC RBC-ENTMCNC: 32.1 GM/DL — SIGNIFICANT CHANGE UP (ref 32–36)
MCV RBC AUTO: 94.8 FL — SIGNIFICANT CHANGE UP (ref 80–100)
MONOCYTES # BLD AUTO: 1.01 K/UL — HIGH (ref 0–0.9)
MONOCYTES NFR BLD AUTO: 9.5 % — SIGNIFICANT CHANGE UP (ref 2–14)
NEUTROPHILS # BLD AUTO: 7.49 K/UL — HIGH (ref 1.8–7.4)
NEUTROPHILS NFR BLD AUTO: 70.1 % — SIGNIFICANT CHANGE UP (ref 43–77)
NRBC # BLD: 0 /100 WBCS — SIGNIFICANT CHANGE UP (ref 0–0)
PLATELET # BLD AUTO: 208 K/UL — SIGNIFICANT CHANGE UP (ref 150–400)
POTASSIUM SERPL-MCNC: 5.5 MMOL/L — HIGH (ref 3.5–5.3)
POTASSIUM SERPL-SCNC: 5.5 MMOL/L — HIGH (ref 3.5–5.3)
PROT SERPL-MCNC: 7 G/DL — SIGNIFICANT CHANGE UP (ref 6–8.3)
RBC # BLD: 3.06 M/UL — LOW (ref 3.8–5.2)
RBC # FLD: 17.4 % — HIGH (ref 10.3–14.5)
SARS-COV-2 RNA SPEC QL NAA+PROBE: SIGNIFICANT CHANGE UP
SODIUM SERPL-SCNC: 135 MMOL/L — SIGNIFICANT CHANGE UP (ref 135–145)
WBC # BLD: 10.67 K/UL — HIGH (ref 3.8–10.5)
WBC # FLD AUTO: 10.67 K/UL — HIGH (ref 3.8–10.5)

## 2022-09-12 PROCEDURE — 70450 CT HEAD/BRAIN W/O DYE: CPT | Mod: MA

## 2022-09-12 PROCEDURE — 71045 X-RAY EXAM CHEST 1 VIEW: CPT | Mod: 26

## 2022-09-12 PROCEDURE — 99285 EMERGENCY DEPT VISIT HI MDM: CPT | Mod: 25

## 2022-09-12 PROCEDURE — 70450 CT HEAD/BRAIN W/O DYE: CPT | Mod: 26,MA

## 2022-09-12 PROCEDURE — 93010 ELECTROCARDIOGRAM REPORT: CPT

## 2022-09-12 PROCEDURE — 87635 SARS-COV-2 COVID-19 AMP PRB: CPT

## 2022-09-12 PROCEDURE — 80053 COMPREHEN METABOLIC PANEL: CPT

## 2022-09-12 PROCEDURE — 71045 X-RAY EXAM CHEST 1 VIEW: CPT

## 2022-09-12 PROCEDURE — 93005 ELECTROCARDIOGRAM TRACING: CPT

## 2022-09-12 PROCEDURE — 85025 COMPLETE CBC W/AUTO DIFF WBC: CPT

## 2022-09-12 PROCEDURE — 99285 EMERGENCY DEPT VISIT HI MDM: CPT | Mod: FS,25

## 2022-09-12 PROCEDURE — 99024 POSTOP FOLLOW-UP VISIT: CPT

## 2022-09-12 PROCEDURE — 36415 COLL VENOUS BLD VENIPUNCTURE: CPT

## 2022-09-12 RX ORDER — ZINC SULFATE TAB 220 MG (50 MG ZINC EQUIVALENT) 220 (50 ZN) MG
220 TAB ORAL DAILY
Refills: 0 | Status: DISCONTINUED | OUTPATIENT
Start: 2022-09-12 | End: 2022-09-17

## 2022-09-12 RX ORDER — ATORVASTATIN CALCIUM 80 MG/1
40 TABLET, FILM COATED ORAL AT BEDTIME
Refills: 0 | Status: DISCONTINUED | OUTPATIENT
Start: 2022-09-12 | End: 2022-09-17

## 2022-09-12 RX ORDER — INSULIN LISPRO 100/ML
VIAL (ML) SUBCUTANEOUS
Refills: 0 | Status: DISCONTINUED | OUTPATIENT
Start: 2022-09-12 | End: 2022-09-17

## 2022-09-12 RX ORDER — DEXTROSE 50 % IN WATER 50 %
15 SYRINGE (ML) INTRAVENOUS ONCE
Refills: 0 | Status: DISCONTINUED | OUTPATIENT
Start: 2022-09-12 | End: 2022-09-17

## 2022-09-12 RX ORDER — DEXTROSE 50 % IN WATER 50 %
12.5 SYRINGE (ML) INTRAVENOUS ONCE
Refills: 0 | Status: DISCONTINUED | OUTPATIENT
Start: 2022-09-12 | End: 2022-09-17

## 2022-09-12 RX ORDER — LANOLIN/MINERAL OIL
1 LOTION (ML) TOPICAL
Qty: 0 | Refills: 0 | DISCHARGE

## 2022-09-12 RX ORDER — SODIUM CHLORIDE 9 MG/ML
1000 INJECTION, SOLUTION INTRAVENOUS
Refills: 0 | Status: DISCONTINUED | OUTPATIENT
Start: 2022-09-12 | End: 2022-09-17

## 2022-09-12 RX ORDER — ASPIRIN/CALCIUM CARB/MAGNESIUM 324 MG
81 TABLET ORAL DAILY
Refills: 0 | Status: DISCONTINUED | OUTPATIENT
Start: 2022-09-12 | End: 2022-09-17

## 2022-09-12 RX ORDER — ACETAMINOPHEN 500 MG
650 TABLET ORAL EVERY 6 HOURS
Refills: 0 | Status: DISCONTINUED | OUTPATIENT
Start: 2022-09-12 | End: 2022-09-17

## 2022-09-12 RX ORDER — PANTOPRAZOLE SODIUM 20 MG/1
40 TABLET, DELAYED RELEASE ORAL
Refills: 0 | Status: DISCONTINUED | OUTPATIENT
Start: 2022-09-12 | End: 2022-09-17

## 2022-09-12 RX ORDER — DEXTROSE 50 % IN WATER 50 %
25 SYRINGE (ML) INTRAVENOUS ONCE
Refills: 0 | Status: DISCONTINUED | OUTPATIENT
Start: 2022-09-12 | End: 2022-09-17

## 2022-09-12 RX ORDER — RISPERIDONE 4 MG/1
0.5 TABLET ORAL
Refills: 0 | Status: DISCONTINUED | OUTPATIENT
Start: 2022-09-12 | End: 2022-09-17

## 2022-09-12 RX ORDER — GLUCAGON INJECTION, SOLUTION 0.5 MG/.1ML
1 INJECTION, SOLUTION SUBCUTANEOUS ONCE
Refills: 0 | Status: DISCONTINUED | OUTPATIENT
Start: 2022-09-12 | End: 2022-09-17

## 2022-09-12 RX ORDER — LANOLIN ALCOHOL/MO/W.PET/CERES
3 CREAM (GRAM) TOPICAL AT BEDTIME
Refills: 0 | Status: DISCONTINUED | OUTPATIENT
Start: 2022-09-12 | End: 2022-09-17

## 2022-09-12 RX ORDER — CALCIUM ACETATE 667 MG
667 TABLET ORAL
Refills: 0 | Status: DISCONTINUED | OUTPATIENT
Start: 2022-09-12 | End: 2022-09-17

## 2022-09-12 RX ORDER — LACTOBACILLUS ACIDOPHILUS 100MM CELL
1 CAPSULE ORAL THREE TIMES A DAY
Refills: 0 | Status: DISCONTINUED | OUTPATIENT
Start: 2022-09-12 | End: 2022-09-17

## 2022-09-12 RX ORDER — CALCIUM ACETATE 667 MG
667 TABLET ORAL
Refills: 0 | Status: DISCONTINUED | OUTPATIENT
Start: 2022-09-12 | End: 2022-09-12

## 2022-09-12 RX ORDER — ONDANSETRON 8 MG/1
4 TABLET, FILM COATED ORAL EVERY 8 HOURS
Refills: 0 | Status: DISCONTINUED | OUTPATIENT
Start: 2022-09-12 | End: 2022-09-17

## 2022-09-12 RX ORDER — METOPROLOL TARTRATE 50 MG
100 TABLET ORAL EVERY 12 HOURS
Refills: 0 | Status: DISCONTINUED | OUTPATIENT
Start: 2022-09-12 | End: 2022-09-17

## 2022-09-12 RX ORDER — HEPARIN SODIUM 5000 [USP'U]/ML
5000 INJECTION INTRAVENOUS; SUBCUTANEOUS
Refills: 0 | Status: DISCONTINUED | OUTPATIENT
Start: 2022-09-12 | End: 2022-09-14

## 2022-09-12 RX ORDER — SENNA PLUS 8.6 MG/1
2 TABLET ORAL AT BEDTIME
Refills: 0 | Status: DISCONTINUED | OUTPATIENT
Start: 2022-09-12 | End: 2022-09-17

## 2022-09-12 RX ORDER — TRAMADOL HYDROCHLORIDE 50 MG/1
50 TABLET ORAL THREE TIMES A DAY
Refills: 0 | Status: DISCONTINUED | OUTPATIENT
Start: 2022-09-12 | End: 2022-09-17

## 2022-09-12 RX ADMIN — Medication 3 MILLIGRAM(S): at 23:58

## 2022-09-12 RX ADMIN — Medication 1 TABLET(S): at 23:58

## 2022-09-12 RX ADMIN — Medication 50 MILLIGRAM(S): at 23:56

## 2022-09-12 RX ADMIN — ATORVASTATIN CALCIUM 40 MILLIGRAM(S): 80 TABLET, FILM COATED ORAL at 23:58

## 2022-09-12 RX ADMIN — Medication 100 MILLIGRAM(S): at 23:57

## 2022-09-12 RX ADMIN — Medication 0.1 MILLIGRAM(S): at 23:57

## 2022-09-12 RX ADMIN — Medication 1 TABLET(S): at 23:57

## 2022-09-12 RX ADMIN — Medication 81 MILLIGRAM(S): at 23:57

## 2022-09-12 RX ADMIN — Medication 1: at 23:56

## 2022-09-12 RX ADMIN — RISPERIDONE 0.5 MILLIGRAM(S): 4 TABLET ORAL at 23:57

## 2022-09-12 RX ADMIN — ZINC SULFATE TAB 220 MG (50 MG ZINC EQUIVALENT) 220 MILLIGRAM(S): 220 (50 ZN) TAB at 23:56

## 2022-09-12 NOTE — ED PROVIDER NOTE - CPE EDP PSYCH NORM
[Normal Growth] : growth [Normal Development] : development [None] : No known medical problems [No Elimination Concerns] : elimination [No Feeding Concerns] : feeding [No Skin Concerns] : skin [Normal Sleep Pattern] : sleep [School] : school [Development and Mental Health] : development and mental health [Nutrition and Physical Activity] : nutrition and physical activity [Oral Health] : oral health [Safety] : safety [No Medications] : ~He/She~ is not on any medications [Patient] : patient [FreeTextEntry1] : healthy male \par no concerns\par safety discussed\par return in fall for flu vaccine normal...

## 2022-09-12 NOTE — ED ADULT NURSE REASSESSMENT NOTE - NS ED NURSE REASSESS COMMENT FT1
Pt revisited Pt revisited. Pt condition remain stable . Pt for transfer to [facility] ER - report given to Nurse Perez - Awaiting for ambulance

## 2022-09-12 NOTE — CONSULT NOTE ADULT - SUBJECTIVE AND OBJECTIVE BOX
CARDIOLOGY CONSULT NOTE    Patient is a 71y Female with a known history of : admitted after fall on way to bath room-denies syncope- had loose bm    HPI:      REVIEW OF SYSTEMS:    CONSTITUTIONAL: No fever, weight loss, or fatigue  EYES: No eye pain, visual disturbances, or discharge  ENMT:  No difficulty hearing, tinnitus, vertigo; No sinus or throat pain  NECK: No pain or stiffness  BREASTS: No pain, masses, or nipple discharge  RESPIRATORY: No cough, wheezing, chills or hemoptysis; No shortness of breath  CARDIOVASCULAR: No chest pain, palpitations, dizziness, or leg swelling  GASTROINTESTINAL: No abdominal or epigastric pain. No nausea, vomiting, or hematemesis; No diarrhea or constipation. No melena or hematochezia.  GENITOURINARY: No dysuria, frequency, hematuria, or incontinence  NEUROLOGICAL: No headaches, memory loss, loss of strength, numbness, or tremors  SKIN: No itching, burning, rashes, or lesions   LYMPH NODES: No enlarged glands  ENDOCRINE: No heat or cold intolerance; No hair loss  MUSCULOSKELETAL: No joint pain or swelling; No muscle, back, or extremity pain  PSYCHIATRIC: No depression, anxiety, mood swings, or difficulty sleeping  HEME/LYMPH: No easy bruising, or bleeding gums  ALLERGY AND IMMUNOLOGIC: No hives or eczema    MEDICATIONS  (STANDING):  aspirin enteric coated 81 milliGRAM(s) Oral daily  atorvastatin 40 milliGRAM(s) Oral at bedtime  calcium acetate 667 milliGRAM(s) Oral four times a day with meals  cloNIDine 0.1 milliGRAM(s) Oral two times a day  dextrose 5%. 1000 milliLiter(s) (50 mL/Hr) IV Continuous <Continuous>  dextrose 5%. 1000 milliLiter(s) (100 mL/Hr) IV Continuous <Continuous>  dextrose 50% Injectable 25 Gram(s) IV Push once  dextrose 50% Injectable 12.5 Gram(s) IV Push once  dextrose 50% Injectable 25 Gram(s) IV Push once  glucagon  Injectable 1 milliGRAM(s) IntraMuscular once  heparin  Injectable (Preservative-Free) 5000 Unit(s) SubCutaneous two times a day  imipramine 50 milliGRAM(s) Oral daily  insulin lispro (ADMELOG) corrective regimen sliding scale   SubCutaneous three times a day before meals  lactobacillus acidophilus 1 Tablet(s) Oral three times a day  metoprolol tartrate 100 milliGRAM(s) Oral every 12 hours  multivitamin 1 Tablet(s) Oral daily  pantoprazole    Tablet 40 milliGRAM(s) Oral before breakfast  risperiDONE   Tablet 0.5 milliGRAM(s) Oral two times a day  senna 2 Tablet(s) Oral at bedtime  zinc sulfate 220 milliGRAM(s) Oral daily    MEDICATIONS  (PRN):  bisacodyl Suppository 10 milliGRAM(s) Rectal daily PRN Constipation  dextrose Oral Gel 15 Gram(s) Oral once PRN Blood Glucose LESS THAN 70 milliGRAM(s)/deciliter  melatonin 3 milliGRAM(s) Oral at bedtime PRN Insomnia  traMADol 50 milliGRAM(s) Oral three times a day PRN Moderate Pain (4 - 6)      ALLERGIES: latex (Unknown)  No Known Drug Allergies      Social History:     FAMILY HISTORY:      PAST MEDICAL & SURGICAL HISTORY:  Diabetes mellitus II      HTN (hypertension)      h/o Anxiety attack      Depression      h/o Myocardial infarct 2007      CAD (coronary artery disease)      h/o Hepatitis A 1969  currently resolved, no symptoms      PAD (peripheral artery disease)      Murmur, cardiac      h/o Smoking  quitted 3/2012      CRF (chronic renal failure), unspecified stage      Dialysis patient      Anemia secondary to renal failure      HTN (hypertension)      Osteomyelitis      ESRD on dialysis      Falls      Ataxia      coronary stent 2007      s/p Ovarian cyst removal      s/p surgical removal of benign Skin lesion epigastric area      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            PHYSICAL EXAMINATION:  -----------------------------  T(C): 36.5 (09-12-22 @ 15:44), Max: 36.7 (09-12-22 @ 08:19)  HR: 78 (09-12-22 @ 15:44) (68 - 78)  BP: 129/68 (09-12-22 @ 15:44) (129/68 - 162/71)  RR: 18 (09-12-22 @ 15:44) (14 - 18)  SpO2: 99% (09-12-22 @ 15:44) (97% - 99%)  Wt(kg): --    Height (cm): 175.3 (09-12 @ 15:44), 175.3 (09-12 @ 08:19)  Weight (kg): 57.2 (09-12 @ 15:44), 56.7 (09-12 @ 08:19)  BMI (kg/m2): 18.6 (09-12 @ 15:44), 18.5 (09-12 @ 08:19)  BSA (m2): 1.7 (09-12 @ 15:44), 1.69 (09-12 @ 08:19)    Constitutional: well developed, normal appearance, well groomed, well nourished, no deformities and no acute distress.   Eyes: the conjunctiva exhibited no abnormalities and the eyelids demonstrated no xanthelasmas.   HEENT: normal oral mucosa, no oral pallor and no oral cyanosis.   Neck: normal jugular venous A waves present, normal jugular venous V waves present and no jugular venous wilson A waves.   Pulmonary: no respiratory distress, normal respiratory rhythm and effort, no accessory muscle use and lungs were clear to auscultation bilaterally.   Cardiovascular: heart rate and rhythm were normal, normal S1 and S2 and no murmur, gallop, rub, heave or thrill are present.   Abdomen: soft, non-tender, no hepato-splenomegaly and no abdominal mass palpated.   Musculoskeletal: the gait could not be assessed..   Extremities: no clubbing of the fingernails, no localized cyanosis, no petechial hemorrhages and no ischemic changes.   Skin: normal skin color and pigmentation, no rash, no venous stasis, no skin lesions, no skin ulcer and no xanthoma was observed.   Psychiatric: oriented to person, place, and time, the affect was normal, the mood was normal and not feeling anxious.     LABS:   --------  09-12    135  |  94<L>  |  101<H>  ----------------------------<  187<H>  5.5<H>   |  32<H>  |  7.52<H>    Ca    10.0      12 Sep 2022 09:24    TPro  7.0  /  Alb  3.3  /  TBili  0.3  /  DBili  x   /  AST  16  /  ALT  21  /  AlkPhos  116  09-12                         9.3    10.67 )-----------( 208      ( 12 Sep 2022 09:24 )             29.0                 RADIOLOGY:  -----------------        ECG:     ECHO

## 2022-09-12 NOTE — ED PROVIDER NOTE - CARE PLAN
Principal Discharge DX:	Weakness  Secondary Diagnosis:	Uremia  Secondary Diagnosis:	Hyperkalemia   1

## 2022-09-12 NOTE — ED ADULT TRIAGE NOTE - CHIEF COMPLAINT QUOTE
Patient A/Ox4 BIBA from [place] ED for transfer to be admitted. Patient c/o weakness and diarrhea. Patient on hemodialysis M W F.

## 2022-09-12 NOTE — H&P ADULT - NSHPLABSRESULTS_GEN_ALL_CORE
FINDINGS:  *  HEMORRHAGE:  No evidence of intracranial hemorrhage.  *  BRAIN PARENCHYMA:  Moderate atrophy. Chronic small right cerebellar   infarct. Moderate periventricular white matter ischemic changes.  No mass   or mass effect.  *  VENTRICLES / SHIFT:  No hydrocephalus. No midline shift.  *  EXTRA-AXIAL / BASAL CISTERNS:  No extra-axial mass. Basal cisterns   preserved.  Atherosclerotic calcifications of the cavernous internal   carotid arteries.  *  CALVARIUM AND EXTRACRANIAL SOFT TISSUES:  No depressed calvarial   fracture.  *  SINUSES, ORBITS, MASTOIDS:  The visualized paranasal sinuses and   mastoid air cells are well aerated.    IMPRESSION:  NO EVIDENCE OF AN ACUTE TRAUMATIC INTRACRANIAL INJURY.    < end of copied text >

## 2022-09-12 NOTE — ED PROVIDER NOTE - OBJECTIVE STATEMENT
70 yo F PMHx Anemia secondary to renal failure, CAD, Depression, ESRD on HD  M/T/F as per pt, DM BIBEMS as a transfer from [place] for admission. Pt had fall earlier today 2/2 weakness, diarrheal episode. Pt found to be hyperkalemia, uremic, needs dialysis. Upon evaluation, pt has no acute complaint.

## 2022-09-12 NOTE — PHARMACOTHERAPY INTERVENTION NOTE - COMMENTS
Medication reconciliation was completed by pharmacy representative. The following discrepancies were discussed with Dr. James    Current order                                       Home Medication  Phoslo 667mg QID                                Phoslo 667mg TID    MD aware and would like to adjust medications to match home dose. Orders updated

## 2022-09-12 NOTE — H&P ADULT - ASSESSMENT
Patient was brought to [place] ED from [facility] for fall today.  As per pt, pt had to go to bathroom, but wheel chair was unreachable, so pt ambulated to bathroom, had one diarrheal episode , got weaker, and slid to the floor. pt denies any pain in the ED.  No head trauma, headache or neck pain.  pt is dnr .Recent admission 07/14/2022  -71 year old female with PMH of Afib (not on AC for hx of multiple falls), T2DM, HTN, HLD, ESRD on HD (MWF), CHF, CAD s/p CABG, PAD S/P R-->L bifem-fem BK pop bypass, recent fempop bypass (6/21/2022), and partial ray amputation right great toe (6/22/2022) Pathology Final Diagnosis  06/23/22 1. Right hallux -Acute and chronic osteomyelitis. -Gangrenous skin and soft tissue. 2. Right first metatarsal/clean margin: -Minimal chronic osteomyelitis. Was on Unasyn to complete 6 weeks on 7/26 - Wound culture with Klebsiella, enterobacter, and also Stenotrophomonas but unclear if real infection .Followed  by ID at [facility]   - Vascular surgery and podiatry follow up requested since patient was supposed to be scheduled for followup within next 1-2 weeks

## 2022-09-12 NOTE — ED ADULT NURSE NOTE - NS ED NURSE LEVEL OF CONSCIOUSNESS ORIENTATION
Pharmacy requesting refill of medication.  Medication has been loaded for review.    Please Fax to local pharmacy.     Comments: Request was received via fax from pharmacy  
Please inform Rx(s) has (have) been sent.  
Refill request from pharmacy for:  metFORMIN (GLUCOPHAGE-XR) 500 MG   escitalopram (LEXAPRO) 20 MG     Last refill: New patient never had prescribed  Last office visit: 11/12/19  Upcoming visit: 01/21/20    Sent to provider for review.  
Oriented - self; Oriented - place; Oriented - time

## 2022-09-12 NOTE — CONSULT NOTE ADULT - SUBJECTIVE AND OBJECTIVE BOX
Patient is a 71y Female whom presented to the hospital with     PAST MEDICAL & SURGICAL HISTORY:  Diabetes mellitus II      HTN (hypertension)      h/o Anxiety attack      Depression      h/o Myocardial infarct 2007      CAD (coronary artery disease)      h/o Hepatitis A 1969  currently resolved, no symptoms      PAD (peripheral artery disease)      Murmur, cardiac      h/o Smoking  quitted 3/2012      CRF (chronic renal failure), unspecified stage      Dialysis patient      Anemia secondary to renal failure      HTN (hypertension)      Osteomyelitis      ESRD on dialysis      Falls      Ataxia      coronary stent 2007      s/p Ovarian cyst removal      s/p surgical removal of benign Skin lesion epigastric area      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          MEDICATIONS  (STANDING):  aspirin enteric coated 81 milliGRAM(s) Oral daily  atorvastatin 40 milliGRAM(s) Oral at bedtime  calcium acetate 667 milliGRAM(s) Oral three times a day with meals  cloNIDine 0.1 milliGRAM(s) Oral two times a day  dextrose 5%. 1000 milliLiter(s) (50 mL/Hr) IV Continuous <Continuous>  dextrose 5%. 1000 milliLiter(s) (100 mL/Hr) IV Continuous <Continuous>  dextrose 50% Injectable 25 Gram(s) IV Push once  dextrose 50% Injectable 12.5 Gram(s) IV Push once  dextrose 50% Injectable 25 Gram(s) IV Push once  glucagon  Injectable 1 milliGRAM(s) IntraMuscular once  heparin  Injectable (Preservative-Free) 5000 Unit(s) SubCutaneous two times a day  imipramine 50 milliGRAM(s) Oral daily  insulin lispro (ADMELOG) corrective regimen sliding scale   SubCutaneous three times a day before meals  lactobacillus acidophilus 1 Tablet(s) Oral three times a day  metoprolol tartrate 100 milliGRAM(s) Oral every 12 hours  multivitamin 1 Tablet(s) Oral daily  pantoprazole    Tablet 40 milliGRAM(s) Oral before breakfast  risperiDONE   Tablet 0.5 milliGRAM(s) Oral two times a day  senna 2 Tablet(s) Oral at bedtime  zinc sulfate 220 milliGRAM(s) Oral daily      Allergies    latex (Unknown)  No Known Drug Allergies    Intolerances        SOCIAL HISTORY:  Denies ETOh,Smoking,     FAMILY HISTORY:      REVIEW OF SYSTEMS:    CONSTITUTIONAL: No weakness, fevers or chills  EYES/ENT: No visual changes;  no throat pain   NECK: No pain or stiffness  RESPIRATORY: No cough, wheezing, hemoptysis; No shortness of breath  CARDIOVASCULAR: No chest pain or palpitations  GASTROINTESTINAL: No abdominal or epigastric pain. No nausea, vomiting,     No diarrhea or constipation. No melena   GENITOURINARY: No dysuria, frequency or hematuria  NEUROLOGICAL: No numbness or weakness  SKIN: dry      VITAL:  T(C): , Max: 36.7 (09-12-22 @ 08:19)  T(F): , Max: 98.1 (09-12-22 @ 08:19)  HR: 78 (09-12-22 @ 15:44)  BP: 129/68 (09-12-22 @ 15:44)  BP(mean): --  RR: 18 (09-12-22 @ 15:44)  SpO2: 99% (09-12-22 @ 15:44)  Wt(kg): --    I and O's:    Height (cm): 175.3 (09-12 @ 15:44), 175.3 (09-12 @ 08:19)  Weight (kg): 57.2 (09-12 @ 15:44), 56.7 (09-12 @ 08:19)  BMI (kg/m2): 18.6 (09-12 @ 15:44), 18.5 (09-12 @ 08:19)  BSA (m2): 1.7 (09-12 @ 15:44), 1.69 (09-12 @ 08:19)    PHYSICAL EXAM:    Constitutional: NAD  HEENT: conjunctive   clear   Neck:  No JVD  Respiratory: CTAB  Cardiovascular: S1 and S2  Gastrointestinal: BS+, soft, NT/ND  Extremities: No peripheral edema  Neurological: A/O x 3, no focal deficits  Psychiatric: Normal mood, normal affect  : No Renteria  Skin: No rashes  Access: Not applicable    LABS:                        9.3    10.67 )-----------( 208      ( 12 Sep 2022 09:24 )             29.0     09-12    135  |  94<L>  |  101<H>  ----------------------------<  187<H>  5.5<H>   |  32<H>  |  7.52<H>    Ca    10.0      12 Sep 2022 09:24    TPro  7.0  /  Alb  3.3  /  TBili  0.3  /  DBili  x   /  AST  16  /  ALT  21  /  AlkPhos  116  09-12      Urine Studies:          RADIOLOGY & ADDITIONAL STUDIES:

## 2022-09-12 NOTE — ED ADULT NURSE NOTE - CHIEF COMPLAINT QUOTE
Patient A/Ox4 BIBA from [place] ED for transfer to be admitted. Patient c/o weakness and diarrhea. Patient on hemodialysis M W F.

## 2022-09-12 NOTE — ED ADULT NURSE NOTE - TEMPLATE
BRIEF OPERATIVE NOTE


Date:  Aug 27, 2018


Pre-Op Diagnosis


ESRD on HD


obesity


DM


Post-Op Diagnosis


same


Procedure Performed


l/s placement PD catheter


FABIANA


primary repair of umbilical and supraumbilical hernias


Surgeon


Scooter


Assistant


Sherrie Marion


Anesthesia Type:  General


Blood Loss


25cc


IV Fluid


550cc


Urine Output


100cc


Specimens Obtained


hernia sack with incarcerated contents


Findings


omental adhesions from previous open chandra, umbilical and supraumbilical 

hernias with incarcerated omentum


Complications


none


Operative Note


Wk # [id number]











ROXY DUPONT MD Aug 27, 2018 10:26 Fall

## 2022-09-12 NOTE — ED PROVIDER NOTE - CLINICAL SUMMARY MEDICAL DECISION MAKING FREE TEXT BOX
I, Rosendo Muñoz MD, have seen and examined the patient on the date of service.  I agree with the MURPHY's assessment as written, with exceptions or additions as noted below or in a separate note. Patient with past medical history of end-stage renal disease on dialysis reportedly Monday, Tuesday, Friday who makes small amount of urine, history diabetes, hypertension, hyperlipidemia who is presenting as a transfer from [facility] for admission for uremia, hyperkalemia and generalized weakness.  At outside facility showed a potassium of 5.5 and EKG was reportedly within normal limits.  Spoke with Dr. Larson who is excepting patient for admission here given we have hemodialysis capabilities.  No reported need for urgent hemodialysis at this time. EKG from OSH visualized, normal qtc, no peaked T waves. I, Rosendo Muñoz MD, have seen and examined the patient on the date of service.  I agree with the MURPHY's assessment as written, with exceptions or additions as noted below or in a separate note. Patient with past medical history of end-stage renal disease on dialysis reportedly Monday, Tuesday, Friday who makes small amount of urine, history diabetes, hypertension, hyperlipidemia who is presenting as a transfer from [facility] for admission for uremia, hyperkalemia and generalized weakness.  At outside facility showed a potassium of 5.5 and EKG was reportedly within normal limits.  PA Spoke with Dr. Larson who is accepting patient for admission here given we have hemodialysis capabilities.  No reported need for urgent hemodialysis at this time. EKG from OSH visualized, normal qtc, no peaked T waves.

## 2022-09-12 NOTE — ED PROVIDER NOTE - CARDIAC, MLM
[Medication Risks Reviewed] : Medication risks reviewed [Surgical risks reviewed] : Surgical risks reviewed [de-identified] : Because Pete has had progressive pain in the right shoulder for the last year unresponsive to a full nonoperative treatment program with radiographs and MRI showing end-stage osteoarthritis with an intact rotator cuff, I believe he is an excellent candidate for anatomic total shoulder arthroplasty.\par \par Using a shoulder model, I reviewed the anatomy of the shoulder and the definition of shoulder arthritis, characterized by the loss of articular cartilage resulting in bone on bone grinding, shoulder stiffness and pain. I reviewed the full nonoperative program of treating shoulder arthritis which includes activity modification and avoidance of those maneuvers that exacerbate pain, anti-inflammatory medications for those patients who can tolerate these medicines, and a gentle exercise program but does not increased pain. The success of nonoperative treatment is dependent on the extent of arthritic changes in the shoulder. I explained that for those patients in whom nonoperative treatment is unsuccessful, shoulder replacement is in excellent treatment for relief of pain and return of shoulder function.\par \par I reviewed that the indication for shoulder replacement is intractable pain, unresponsive to non-operative treatment, that compromises  activities of daily living and recreation. The procedure is performed under a regional scalene block anesthesia, and I reviewed that a three-inch anterior surgical incision is utilized and reviewed the details of replacing the ball and socket of the shoulder joint. I explained that the vast majority of patients with osteoarthritis of the glenohumeral joint have intact rotator cuff tendons, and  I recommend non-constrained shoulder arthroplasty in such cases. However, if at the time of surgery it is discovered that the rotator cuff tendons are torn or severely attenuated, I would then proceed with a different design of shoulder replacement that is indicated in such cases, i.e. a reverse shoulder replacement. I then reviewed the details and design principles of reverse shoulder arthroplasty.\par \par The majority of patients require only an overnight stay in the hospital, and the postoperative recovery was reviewed in detail, including the fact that clinical improvement proceeds for a total of 9-12 months postoperatively before final outcome can be assessed. Aproximately 90% of patients achieve excellent pain relief and return of overhead function provided that the rotator cuff tendons and muscles are intact. Patient who undergo a reverse total shoulder arthroplasty also have excellent pain relief and improved motion in 90% of patients.\par \par I then summarized the indications for surgery, the nature of the surgical procedure itself, the alternative methods of treatment, and the possible complications including instability, loosening of the components, nerve injury, and infection. I made sure that these issues were understood and then answered all questions.\par He will schedule right shoulder replacement at his convenience. Today his clinical right shoulder [employer] score is 30 on a scale of 100 indicating severe compromise of shoulder function. Normal rate, regular rhythm.  Heart sounds S1, S2.  No murmurs, rubs or gallops.

## 2022-09-12 NOTE — ED ADULT NURSE NOTE - NSIMPLEMENTINTERV_GEN_ALL_ED
Implemented All Fall with Harm Risk Interventions:  [place] to call system. Call bell, personal items and telephone within reach. Instruct patient to call for assistance. Room bathroom lighting operational. Non-slip footwear when patient is off stretcher. Physically safe environment: no spills, clutter or unnecessary equipment. Stretcher in lowest position, wheels locked, appropriate side rails in place. Provide visual cue, wrist band, yellow gown, etc. Monitor gait and stability. Monitor for mental status changes and reorient to person, place, and time. Review medications for side effects contributing to fall risk. Reinforce activity limits and safety measures with patient and family. Provide visual clues: red socks.

## 2022-09-12 NOTE — ED ADULT NURSE NOTE - OBJECTIVE STATEMENT
patient came in ED, transferred from [facility] for weakness, Dialysis patient M-[id number]. alert and oriented X 4. afebrile. non-labored respiration noted.  lungs clear and equal on auscultation. abdomen nondistended, nontender. on fall porecaution

## 2022-09-12 NOTE — ED PROVIDER NOTE - OBJECTIVE STATEMENT
BIBEMS for fall today.  As per pt, pt had to go to bathroom, but wheel chair was unreachable, so pt ambulated to bathroom, had one diarrheal episode , got weaker, and slid to the floor. pt denies any pain in the ED.  No head trauma, headache or neck pain.  pt is dnr

## 2022-09-12 NOTE — H&P ADULT - HISTORY OF PRESENT ILLNESS
Patient was brought to [place] ED from [facility] for fall today.  As per pt, pt had to go to bathroom, but wheel chair was unreachable, so pt ambulated to bathroom, had one diarrheal episode , got weaker, and slid to the floor. pt denies any pain in the ED.  No head trauma, headache or neck pain.  pt is dnr .Recent admission 07/14/2022  -71 year old female with PMH of Afib (not on AC for hx of multiple falls), T2DM, HTN, HLD, ESRD on HD (MWF), CHF, CAD s/p CABG, PAD S/P R-->L bifem-fem BK pop bypass, recent fempop bypass (6/21/2022), and partial ray amputation right great toe (6/22/2022) Pathology Final Diagnosis  06/23/22 1. Right hallux -Acute and chronic osteomyelitis. -Gangrenous skin and soft tissue. 2. Right first metatarsal/clean margin: -Minimal chronic osteomyelitis. Was on Unasyn to complete 6 weeks on 7/26 - Wound culture with Klebsiella, enterobacter, and also Stenotrophomonas but unclear if real infection .Followed  by ID at [facility]   - Vascular surgery and podiatry follow up requested since patient was supposed to be scheduled for followup within next 1-2 weeks

## 2022-09-12 NOTE — ED ADULT NURSE NOTE - OBJECTIVE STATEMENT
Pt BIBA from [facility] for a fall incident. Pt reported that she fell  - felt on her way back to her bed from the bathroom . pt denies any head injury , dizziness, LOC, vomiting but stated that she have diarrhea. Pt sent in for CT scan and medical evaluation Pt denies any pain related to the fall incident today. Pt is a dialysis pt AV shunt noted on the right distal forearm (+) for thrill and (+) bruit

## 2022-09-13 DIAGNOSIS — T14.8XXA OTHER INJURY OF UNSPECIFIED BODY REGION, INITIAL ENCOUNTER: ICD-10-CM

## 2022-09-13 LAB
A1C WITH ESTIMATED AVERAGE GLUCOSE RESULT: 9.3 % — HIGH (ref 4–5.6)
ALBUMIN SERPL ELPH-MCNC: 2.7 G/DL — LOW (ref 3.3–5)
ALP SERPL-CCNC: 111 U/L — SIGNIFICANT CHANGE UP (ref 40–120)
ALT FLD-CCNC: 15 U/L — SIGNIFICANT CHANGE UP (ref 12–78)
ANION GAP SERPL CALC-SCNC: 7 MMOL/L — SIGNIFICANT CHANGE UP (ref 5–17)
AST SERPL-CCNC: 14 U/L — LOW (ref 15–37)
BASOPHILS # BLD AUTO: 0.06 K/UL — SIGNIFICANT CHANGE UP (ref 0–0.2)
BASOPHILS NFR BLD AUTO: 0.6 % — SIGNIFICANT CHANGE UP (ref 0–2)
BILIRUB SERPL-MCNC: 0.4 MG/DL — SIGNIFICANT CHANGE UP (ref 0.2–1.2)
BUN SERPL-MCNC: 38 MG/DL — HIGH (ref 7–23)
CALCIUM SERPL-MCNC: 8.9 MG/DL — SIGNIFICANT CHANGE UP (ref 8.5–10.1)
CHLORIDE SERPL-SCNC: 93 MMOL/L — LOW (ref 96–108)
CO2 SERPL-SCNC: 32 MMOL/L — HIGH (ref 22–31)
CREAT SERPL-MCNC: 4.2 MG/DL — HIGH (ref 0.5–1.3)
EGFR: 11 ML/MIN/1.73M2 — LOW
EOSINOPHIL # BLD AUTO: 0.42 K/UL — SIGNIFICANT CHANGE UP (ref 0–0.5)
EOSINOPHIL NFR BLD AUTO: 4.2 % — SIGNIFICANT CHANGE UP (ref 0–6)
ESTIMATED AVERAGE GLUCOSE: 220 MG/DL — HIGH (ref 68–114)
GLUCOSE SERPL-MCNC: 223 MG/DL — HIGH (ref 70–99)
HCT VFR BLD CALC: 26.3 % — LOW (ref 34.5–45)
HGB BLD-MCNC: 8.7 G/DL — LOW (ref 11.5–15.5)
IMM GRANULOCYTES NFR BLD AUTO: 0.6 % — SIGNIFICANT CHANGE UP (ref 0–1.5)
INR BLD: 0.92 RATIO — SIGNIFICANT CHANGE UP (ref 0.88–1.16)
LYMPHOCYTES # BLD AUTO: 1.03 K/UL — SIGNIFICANT CHANGE UP (ref 1–3.3)
LYMPHOCYTES # BLD AUTO: 10.3 % — LOW (ref 13–44)
MCHC RBC-ENTMCNC: 30.6 PG — SIGNIFICANT CHANGE UP (ref 27–34)
MCHC RBC-ENTMCNC: 33.1 GM/DL — SIGNIFICANT CHANGE UP (ref 32–36)
MCV RBC AUTO: 92.6 FL — SIGNIFICANT CHANGE UP (ref 80–100)
MONOCYTES # BLD AUTO: 1.28 K/UL — HIGH (ref 0–0.9)
MONOCYTES NFR BLD AUTO: 12.8 % — SIGNIFICANT CHANGE UP (ref 2–14)
NEUTROPHILS # BLD AUTO: 7.18 K/UL — SIGNIFICANT CHANGE UP (ref 1.8–7.4)
NEUTROPHILS NFR BLD AUTO: 71.5 % — SIGNIFICANT CHANGE UP (ref 43–77)
NRBC # BLD: 0 /100 WBCS — SIGNIFICANT CHANGE UP (ref 0–0)
PLATELET # BLD AUTO: 209 K/UL — SIGNIFICANT CHANGE UP (ref 150–400)
POTASSIUM SERPL-MCNC: 5.6 MMOL/L — HIGH (ref 3.5–5.3)
POTASSIUM SERPL-SCNC: 5.6 MMOL/L — HIGH (ref 3.5–5.3)
PROT SERPL-MCNC: 5.9 G/DL — LOW (ref 6–8.3)
PROTHROM AB SERPL-ACNC: 10.7 SEC — SIGNIFICANT CHANGE UP (ref 10.5–13.4)
RBC # BLD: 2.84 M/UL — LOW (ref 3.8–5.2)
RBC # FLD: 17.2 % — HIGH (ref 10.3–14.5)
SODIUM SERPL-SCNC: 132 MMOL/L — LOW (ref 135–145)
WBC # BLD: 10.03 K/UL — SIGNIFICANT CHANGE UP (ref 3.8–10.5)
WBC # FLD AUTO: 10.03 K/UL — SIGNIFICANT CHANGE UP (ref 3.8–10.5)

## 2022-09-13 PROCEDURE — 93926 LOWER EXTREMITY STUDY: CPT | Mod: 26,RT

## 2022-09-13 PROCEDURE — 99221 1ST HOSP IP/OBS SF/LOW 40: CPT | Mod: 24

## 2022-09-13 RX ORDER — NYSTATIN CREAM 100000 [USP'U]/G
1 CREAM TOPICAL
Refills: 0 | Status: DISCONTINUED | OUTPATIENT
Start: 2022-09-13 | End: 2022-09-17

## 2022-09-13 RX ADMIN — Medication 1 TABLET(S): at 21:48

## 2022-09-13 RX ADMIN — HEPARIN SODIUM 5000 UNIT(S): 5000 INJECTION INTRAVENOUS; SUBCUTANEOUS at 17:46

## 2022-09-13 RX ADMIN — Medication 81 MILLIGRAM(S): at 12:18

## 2022-09-13 RX ADMIN — Medication 50 MILLIGRAM(S): at 12:18

## 2022-09-13 RX ADMIN — RISPERIDONE 0.5 MILLIGRAM(S): 4 TABLET ORAL at 17:41

## 2022-09-13 RX ADMIN — PANTOPRAZOLE SODIUM 40 MILLIGRAM(S): 20 TABLET, DELAYED RELEASE ORAL at 06:06

## 2022-09-13 RX ADMIN — RISPERIDONE 0.5 MILLIGRAM(S): 4 TABLET ORAL at 05:57

## 2022-09-13 RX ADMIN — ATORVASTATIN CALCIUM 40 MILLIGRAM(S): 80 TABLET, FILM COATED ORAL at 21:49

## 2022-09-13 RX ADMIN — ZINC SULFATE TAB 220 MG (50 MG ZINC EQUIVALENT) 220 MILLIGRAM(S): 220 (50 ZN) TAB at 12:18

## 2022-09-13 RX ADMIN — Medication 0.1 MILLIGRAM(S): at 05:58

## 2022-09-13 RX ADMIN — NYSTATIN CREAM 1 APPLICATION(S): 100000 CREAM TOPICAL at 06:06

## 2022-09-13 RX ADMIN — Medication 1 TABLET(S): at 13:44

## 2022-09-13 RX ADMIN — Medication 100 MILLIGRAM(S): at 05:58

## 2022-09-13 RX ADMIN — HEPARIN SODIUM 5000 UNIT(S): 5000 INJECTION INTRAVENOUS; SUBCUTANEOUS at 05:57

## 2022-09-13 RX ADMIN — Medication 667 MILLIGRAM(S): at 17:35

## 2022-09-13 RX ADMIN — NYSTATIN CREAM 1 APPLICATION(S): 100000 CREAM TOPICAL at 17:39

## 2022-09-13 RX ADMIN — SENNA PLUS 2 TABLET(S): 8.6 TABLET ORAL at 21:48

## 2022-09-13 RX ADMIN — Medication 0.1 MILLIGRAM(S): at 17:37

## 2022-09-13 RX ADMIN — Medication 2: at 08:27

## 2022-09-13 RX ADMIN — Medication 667 MILLIGRAM(S): at 12:18

## 2022-09-13 RX ADMIN — Medication 3 MILLIGRAM(S): at 21:50

## 2022-09-13 RX ADMIN — Medication 1: at 17:35

## 2022-09-13 RX ADMIN — Medication 667 MILLIGRAM(S): at 08:27

## 2022-09-13 RX ADMIN — Medication 650 MILLIGRAM(S): at 21:49

## 2022-09-13 RX ADMIN — Medication 100 MILLIGRAM(S): at 17:37

## 2022-09-13 RX ADMIN — Medication 1 TABLET(S): at 12:18

## 2022-09-13 RX ADMIN — Medication 1 TABLET(S): at 05:58

## 2022-09-13 NOTE — DIETITIAN INITIAL EVALUATION ADULT - ADD RECOMMEND
Recommend Nepro PO BID  Suggest speech evaluation as patient was previously on regular diet/ thin liquids with no issues

## 2022-09-13 NOTE — PROGRESS NOTE ADULT - SUBJECTIVE AND OBJECTIVE BOX
PROGRESS NOTE  Patient is a 71y old  Female who presents with a chief complaint of s/p fall (13 Sep 2022 12:38)  Chart and available morning labs /imaging are reviewed electronically , urgent issues addressed . More information  is being added upon completion of rounds , when more information is collected and management discussed with consultants , medical staff and social service/case management on the floor     OVERNIGHT  No new issues reported by medical staff . All above noted Patient is resting in a bed comfortably .Confused ,poor mentation .No distress noted     HPI:  Patient was brought to [place] ED from [facility] for fall today.  As per pt, pt had to go to bathroom, but wheel chair was unreachable, so pt ambulated to bathroom, had one diarrheal episode , got weaker, and slid to the floor. pt denies any pain in the ED.  No head trauma, headache or neck pain.  pt is dnr .Recent admission 07/14/2022  -71 year old female with PMH of Afib (not on AC for hx of multiple falls), T2DM, HTN, HLD, ESRD on HD (MWF), CHF, CAD s/p CABG, PAD S/P R-->L bifem-fem BK pop bypass, recent fempop bypass (6/21/2022), and partial ray amputation right great toe (6/22/2022) Pathology Final Diagnosis  06/23/22 1. Right hallux -Acute and chronic osteomyelitis. -Gangrenous skin and soft tissue. 2. Right first metatarsal/clean margin: -Minimal chronic osteomyelitis. Was on Unasyn to complete 6 weeks on 7/26 - Wound culture with Klebsiella, enterobacter, and also Stenotrophomonas but unclear if real infection .Followed  by ID at [facility]   - Vascular surgery and podiatry follow up requested since patient was supposed to be scheduled for followup within next 1-2 weeks    (12 Sep 2022 19:25)    PAST MEDICAL & SURGICAL HISTORY:  Diabetes mellitus II      HTN (hypertension)      h/o Anxiety attack      Depression      h/o Myocardial infarct 2007      CAD (coronary artery disease)      h/o Hepatitis A 1969  currently resolved, no symptoms      PAD (peripheral artery disease)      Murmur, cardiac      h/o Smoking  quitted 3/2012      CRF (chronic renal failure), unspecified stage      Dialysis patient      Anemia secondary to renal failure      HTN (hypertension)      Osteomyelitis      ESRD on dialysis      Falls      Ataxia      coronary stent 2007      s/p Ovarian cyst removal      s/p surgical removal of benign Skin lesion epigastric area      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          MEDICATIONS  (STANDING):  aspirin enteric coated 81 milliGRAM(s) Oral daily  atorvastatin 40 milliGRAM(s) Oral at bedtime  calcium acetate 667 milliGRAM(s) Oral three times a day with meals  cloNIDine 0.1 milliGRAM(s) Oral two times a day  dextrose 5%. 1000 milliLiter(s) (50 mL/Hr) IV Continuous <Continuous>  dextrose 5%. 1000 milliLiter(s) (100 mL/Hr) IV Continuous <Continuous>  dextrose 50% Injectable 25 Gram(s) IV Push once  dextrose 50% Injectable 12.5 Gram(s) IV Push once  dextrose 50% Injectable 25 Gram(s) IV Push once  glucagon  Injectable 1 milliGRAM(s) IntraMuscular once  heparin  Injectable (Preservative-Free) 5000 Unit(s) SubCutaneous two times a day  imipramine 50 milliGRAM(s) Oral daily  insulin lispro (ADMELOG) corrective regimen sliding scale   SubCutaneous three times a day before meals  lactobacillus acidophilus 1 Tablet(s) Oral three times a day  metoprolol tartrate 100 milliGRAM(s) Oral every 12 hours  multivitamin 1 Tablet(s) Oral daily  nystatin Powder 1 Application(s) Topical two times a day  pantoprazole    Tablet 40 milliGRAM(s) Oral before breakfast  risperiDONE   Tablet 0.5 milliGRAM(s) Oral two times a day  senna 2 Tablet(s) Oral at bedtime  zinc sulfate 220 milliGRAM(s) Oral daily    MEDICATIONS  (PRN):  acetaminophen     Tablet .. 650 milliGRAM(s) Oral every 6 hours PRN Temp greater or equal to 38C (100.4F), Mild Pain (1 - 3)  aluminum hydroxide/magnesium hydroxide/simethicone Suspension 30 milliLiter(s) Oral every 4 hours PRN Dyspepsia  bisacodyl Suppository 10 milliGRAM(s) Rectal daily PRN Constipation  dextrose Oral Gel 15 Gram(s) Oral once PRN Blood Glucose LESS THAN 70 milliGRAM(s)/deciliter  melatonin 3 milliGRAM(s) Oral at bedtime PRN Insomnia  ondansetron Injectable 4 milliGRAM(s) IV Push every 8 hours PRN Nausea and/or Vomiting  traMADol 50 milliGRAM(s) Oral three times a day PRN Moderate Pain (4 - 6)      OBJECTIVE    T(C): 37.4 (09-13-22 @ 20:23), Max: 37.6 (09-13-22 @ 04:44)  HR: 74 (09-13-22 @ 20:23) (65 - 92)  BP: 151/66 (09-13-22 @ 20:23) (137/70 - 177/74)  RR: 18 (09-13-22 @ 20:23) (16 - 18)  SpO2: 97% (09-13-22 @ 20:23) (94% - 97%)  Wt(kg): --  I&O's Summary    12 Sep 2022 07:01  -  13 Sep 2022 07:00  --------------------------------------------------------  IN: 0 mL / OUT: 1500 mL / NET: -1500 mL    13 Sep 2022 07:01  -  13 Sep 2022 20:55  --------------------------------------------------------  IN: 890 mL / OUT: 0 mL / NET: 890 mL          REVIEW OF SYSTEMS:  CONSTITUTIONAL: No fever, weight loss, or fatigue  EYES: No eye pain, visual disturbances, or discharge  ENMT:   No sinus or throat pain  NECK: No pain or stiffness  RESPIRATORY: No cough, wheezing, chills or hemoptysis; No shortness of breath  CARDIOVASCULAR: No chest pain, palpitations, dizziness, or leg swelling  GASTROINTESTINAL: No abdominal pain. No nausea, vomiting; No diarrhea or constipation. No melena or hematochezia.  GENITOURINARY: No dysuria, frequency, hematuria, or incontinence  NEUROLOGICAL: No headaches, memory loss, loss of strength, numbness, or tremors  SKIN: No itching, burning, rashes, or lesions   MUSCULOSKELETAL: No joint pain or swelling; No muscle, back, or extremity pain    PHYSICAL EXAM:  Appearance: NAD. VS past 24 hrs -as above   HEENT:   Moist oral mucosa. Conjunctiva clear b/l.   Neck : supple  Respiratory: Lungs CTAB.  Gastrointestinal:  Soft, nontender. No rebound. No rigidity. BS present	  Cardiovascular: RRR ,S1S2 present  Neurologic: Non-focal. Moving all extremities.  Extremities: No edema. No erythema. No calf tenderness.  Skin: No rashes, No ecchymoses, No cyanosis.	  wounds ,skin lesions-See skin assesment flow sheet   LABS:                        8.7    10.03 )-----------( 209      ( 13 Sep 2022 07:28 )             26.3     09-13    132<L>  |  93<L>  |  38<H>  ----------------------------<  223<H>  5.6<H>   |  32<H>  |  4.20<H>    Ca    8.9      13 Sep 2022 07:28    TPro  5.9<L>  /  Alb  2.7<L>  /  TBili  0.4  /  DBili  x   /  AST  14<L>  /  ALT  15  /  AlkPhos  111  09-13    CAPILLARY BLOOD GLUCOSE      POCT Blood Glucose.: 187 mg/dL (13 Sep 2022 17:14)  POCT Blood Glucose.: 150 mg/dL (13 Sep 2022 12:17)  POCT Blood Glucose.: 208 mg/dL (13 Sep 2022 08:09)  POCT Blood Glucose.: 176 mg/dL (12 Sep 2022 23:07)    PT/INR - ( 13 Sep 2022 07:28 )   PT: 10.7 sec;   INR: 0.92 ratio       25 minutes aggregate time was spent on this visit, 50% visit time spent in care co-ordination with other attendings and counselling patient .I have discussed care plan with patient / HCP/family member ,who expressed understanding of problems treatment and their effect and side effects, alternatives in details. I have asked if they have any questions and concerns and appropriately addressed them to best of my ability.         RADIOLOGY & ADDITIONAL TESTS:   reviewed elctronically  ASSESSMENT/PLAN:

## 2022-09-13 NOTE — PATIENT PROFILE ADULT - FALL HARM RISK - HARM RISK INTERVENTIONS
Assistance with ambulation/Assistance OOB with selected safe patient handling equipment/Communicate Risk of Fall with Harm to all staff/Discuss with provider need for PT consult/Monitor for mental status changes/Monitor gait and stability/Move patient closer to nurses' station/Provide patient with walking aids - walker, cane, crutches/Reinforce activity limits and safety measures with patient and family/Reorient to person, place and time as needed/Tailored Fall Risk Interventions/Toileting schedule using arm’s reach rule for commode and bathroom/Use of alarms - bed, chair and/or voice tab/Visual Cue: Yellow wristband and red socks/Bed in lowest position, wheels locked, appropriate side rails in place/Call bell, personal items and telephone in reach/Instruct patient to call for assistance before getting out of bed or chair/Non-slip footwear when patient is out of bed/[place] to call system/Physically safe environment - no spills, clutter or unnecessary equipment/Purposeful Proactive Rounding/Room/bathroom lighting operational, light cord in reach

## 2022-09-13 NOTE — PROGRESS NOTE ADULT - SUBJECTIVE AND OBJECTIVE BOX
Patient is a 71y Female with a known history of :  Fall [W19.XXXA]    ESRD on dialysis [N18.6]    Acute on chronic osteomyelitis [M86.10]    PVD (peripheral vascular disease) [I73.9]    DM (diabetes mellitus) [E11.9]    Prophylactic measure [Z29.9]      HPI:  Patient was brought to [place] ED from [facility] for fall today.  As per pt, pt had to go to bathroom, but wheel chair was unreachable, so pt ambulated to bathroom, had one diarrheal episode , got weaker, and slid to the floor. pt denies any pain in the ED.  No head trauma, headache or neck pain.  pt is dnr .Recent admission 07/14/2022  -71 year old female with PMH of Afib (not on AC for hx of multiple falls), T2DM, HTN, HLD, ESRD on HD (MWF), CHF, CAD s/p CABG, PAD S/P R-->L bifem-fem BK pop bypass, recent fempop bypass (6/21/2022), and partial ray amputation right great toe (6/22/2022) Pathology Final Diagnosis  06/23/22 1. Right hallux -Acute and chronic osteomyelitis. -Gangrenous skin and soft tissue. 2. Right first metatarsal/clean margin: -Minimal chronic osteomyelitis. Was on Unasyn to complete 6 weeks on 7/26 - Wound culture with Klebsiella, enterobacter, and also Stenotrophomonas but unclear if real infection .Followed  by ID at [facility]   - Vascular surgery and podiatry follow up requested since patient was supposed to be scheduled for followup within next 1-2 weeks    (12 Sep 2022 19:25)      REVIEW OF SYSTEMS:    CONSTITUTIONAL: No fever, weight loss, or fatigue  EYES: No eye pain, visual disturbances, or discharge  ENMT:  No difficulty hearing, tinnitus, vertigo; No sinus or throat pain  NECK: No pain or stiffness  BREASTS: No pain, masses, or nipple discharge  RESPIRATORY: No cough, wheezing, chills or hemoptysis; No shortness of breath  CARDIOVASCULAR: No chest pain, palpitations, dizziness, or leg swelling  GASTROINTESTINAL: No abdominal or epigastric pain. No nausea, vomiting, or hematemesis; No diarrhea or constipation. No melena or hematochezia.  GENITOURINARY: No dysuria, frequency, hematuria, or incontinence  NEUROLOGICAL: No headaches, memory loss, loss of strength, numbness, or tremors  SKIN: No itching, burning, rashes, or lesions   LYMPH NODES: No enlarged glands  ENDOCRINE: No heat or cold intolerance; No hair loss  MUSCULOSKELETAL: No joint pain or swelling; No muscle, back, or extremity pain  PSYCHIATRIC: No depression, anxiety, mood swings, or difficulty sleeping  HEME/LYMPH: No easy bruising, or bleeding gums  ALLERGY AND IMMUNOLOGIC: No hives or eczema    MEDICATIONS  (STANDING):  aspirin enteric coated 81 milliGRAM(s) Oral daily  atorvastatin 40 milliGRAM(s) Oral at bedtime  calcium acetate 667 milliGRAM(s) Oral three times a day with meals  cloNIDine 0.1 milliGRAM(s) Oral two times a day  dextrose 5%. 1000 milliLiter(s) (50 mL/Hr) IV Continuous <Continuous>  dextrose 5%. 1000 milliLiter(s) (100 mL/Hr) IV Continuous <Continuous>  dextrose 50% Injectable 25 Gram(s) IV Push once  dextrose 50% Injectable 12.5 Gram(s) IV Push once  dextrose 50% Injectable 25 Gram(s) IV Push once  glucagon  Injectable 1 milliGRAM(s) IntraMuscular once  heparin  Injectable (Preservative-Free) 5000 Unit(s) SubCutaneous two times a day  imipramine 50 milliGRAM(s) Oral daily  insulin lispro (ADMELOG) corrective regimen sliding scale   SubCutaneous three times a day before meals  lactobacillus acidophilus 1 Tablet(s) Oral three times a day  metoprolol tartrate 100 milliGRAM(s) Oral every 12 hours  multivitamin 1 Tablet(s) Oral daily  nystatin Powder 1 Application(s) Topical two times a day  pantoprazole    Tablet 40 milliGRAM(s) Oral before breakfast  risperiDONE   Tablet 0.5 milliGRAM(s) Oral two times a day  senna 2 Tablet(s) Oral at bedtime  zinc sulfate 220 milliGRAM(s) Oral daily    MEDICATIONS  (PRN):  acetaminophen     Tablet .. 650 milliGRAM(s) Oral every 6 hours PRN Temp greater or equal to 38C (100.4F), Mild Pain (1 - 3)  aluminum hydroxide/magnesium hydroxide/simethicone Suspension 30 milliLiter(s) Oral every 4 hours PRN Dyspepsia  bisacodyl Suppository 10 milliGRAM(s) Rectal daily PRN Constipation  dextrose Oral Gel 15 Gram(s) Oral once PRN Blood Glucose LESS THAN 70 milliGRAM(s)/deciliter  melatonin 3 milliGRAM(s) Oral at bedtime PRN Insomnia  ondansetron Injectable 4 milliGRAM(s) IV Push every 8 hours PRN Nausea and/or Vomiting  traMADol 50 milliGRAM(s) Oral three times a day PRN Moderate Pain (4 - 6)      ALLERGIES: latex (Unknown)  No Known Drug Allergies      FAMILY HISTORY:      PHYSICAL EXAMINATION:  -----------------------------  T(C): 37.6 (09-13-22 @ 04:44), Max: 37.6 (09-13-22 @ 04:44)  HR: 76 (09-13-22 @ 04:44) (62 - 92)  BP: 137/70 (09-13-22 @ 04:44) (129/68 - 180/72)  RR: 18 (09-13-22 @ 04:44) (16 - 18)  SpO2: 95% (09-13-22 @ 04:44) (94% - 99%)  Wt(kg): --    09-12 @ 07:01  -  09-13 @ 07:00  --------------------------------------------------------  IN:  Total IN: 0 mL    OUT:    Other (mL): 1500 mL  Total OUT: 1500 mL    Total NET: -1500 mL        Height (cm): 175.3 (09-12 @ 15:44)  Weight (kg): 57.2 (09-12 @ 15:44)  BMI (kg/m2): 18.6 (09-12 @ 15:44)  BSA (m2): 1.7 (09-12 @ 15:44)    VITALS  T(C): 37.6 (09-13-22 @ 04:44), Max: 37.6 (09-13-22 @ 04:44)  HR: 76 (09-13-22 @ 04:44) (62 - 92)  BP: 137/70 (09-13-22 @ 04:44) (129/68 - 180/72)  RR: 18 (09-13-22 @ 04:44) (16 - 18)  SpO2: 95% (09-13-22 @ 04:44) (94% - 99%)    Constitutional: well developed, normal appearance, well groomed, well nourished, no deformities and no acute distress.   Eyes: the conjunctiva exhibited no abnormalities and the eyelids demonstrated no xanthelasmas.   HEENT: normal oral mucosa, no oral pallor and no oral cyanosis.   Neck: normal jugular venous A waves present, normal jugular venous V waves present and no jugular venous wilson A waves.   Pulmonary: no respiratory distress, normal respiratory rhythm and effort, no accessory muscle use and lungs were clear to auscultation bilaterally.   Cardiovascular: heart rate and rhythm were normal, normal S1 and S2 and no murmur, gallop, rub, heave or thrill are present.   Abdomen: soft, non-tender, no hepato-splenomegaly and no abdominal mass palpated.   Musculoskeletal: the gait could not be assessed..   Extremities: no clubbing of the fingernails, no localized cyanosis, no petechial hemorrhages and no ischemic changes.   Skin: normal skin color and pigmentation, no rash, no venous stasis, no skin lesions, no skin ulcer and no xanthoma was observed.   Psychiatric: oriented to person, place, and time, the affect was normal, the mood was normal and not feeling anxious.     LABS:   --------  09-13    132<L>  |  93<L>  |  38<H>  ----------------------------<  223<H>  5.6<H>   |  32<H>  |  4.20<H>    Ca    8.9      13 Sep 2022 07:28    TPro  5.9<L>  /  Alb  2.7<L>  /  TBili  0.4  /  DBili  x   /  AST  14<L>  /  ALT  15  /  AlkPhos  111  09-13                         8.7    10.03 )-----------( 209      ( 13 Sep 2022 07:28 )             26.3                 RADIOLOGY:  -----------------    ECG:     ECHO:

## 2022-09-13 NOTE — CONSULT NOTE ADULT - ASSESSMENT
Patient was brought to [facility] from [facility] for fall today.  As per pt, pt had to go to bathroom, but wheel chair was unreachable, so pt ambulated to bathroom, had one diarrheal episode , got weaker, and slid to the floor. pt denies any pain in the ED.  No head trauma, headache or neck pain.  pt is dnr .Recent admission 07/14/2022  -71 year old female with PMH of Afib (not on AC for hx of multiple falls), T2DM, HTN, HLD, ESRD on HD (MWF), CHF, CAD s/p CABG, PAD S/P R-->L bifem-fem BK pop bypass, recent fempop bypass (6/21/2022), and partial ray amputation right great toe (6/22/2022) Pathology Final Diagnosis  06/23/22 1. Right hallux -Acute and chronic osteomyelitis. -Gangrenous skin and soft tissue. 2. Right first metatarsal/clean margin: -Minimal chronic osteomyelitis. Was on Unasyn to complete 6 weeks on 7/26 - Wound culture with Klebsiella, enterobacter, and also Stenotrophomonas but unclear if real infection .Followed  by ID at [facility]   - Vascular surgery and podiatry follow up requested since patient was supposed to be scheduled for followup within next 1-2 weeks    (12 Sep 2022 19:25)    esrd on hd   Excess fluids and waste products will be removed from your blood; your electrolytes will be balanced; your blood pressure will be controlled.    ANEMIA PLAN:  Anemia of chronic disease:  Well controlled by Aranesp  H and H subtherapeutic .  We will continue Aranesp aiming for a HCT of 32-36 %.   We will monitor Iron stores, B12 and RBC folate .     BP monitoring,continue current antihypertensive meds, low salt diet,followup with PMD in 1-2 weeks      
admiited with fall and weakness  esrd - on hd  ashd s/p cabg  s/p coronary stent  hypertension  dm2  dyslipidemia  pvd-s/p bypass   s/p amputation of rt great toe for osteomyelitis
70 y/o F well known to vascular surgery S/P R fem-pop bypass 6/21/22 who presents with fall  From vascular perspective re RLE it appears well perfused on exam  Will get RLE arterial duplex to further assess  Cont ASA and statin  DM management  Wound care as per podiatry  Discussed with Dr Miller  
left ankle wound   right post op open wound at the stump of the hallux  possible OM

## 2022-09-13 NOTE — DIETITIAN INITIAL EVALUATION ADULT - PERTINENT LABORATORY DATA
09-13    132<L>  |  93<L>  |  38<H>  ----------------------------<  223<H>  5.6<H>   |  32<H>  |  4.20<H>    Ca    8.9      13 Sep 2022 07:28    TPro  5.9<L>  /  Alb  2.7<L>  /  TBili  0.4  /  DBili  x   /  AST  14<L>  /  ALT  15  /  AlkPhos  111  09-13  POCT Blood Glucose.: 208 mg/dL (09-13-22 @ 08:09)  A1C with Estimated Average Glucose Result: 9.0 % (06-17-22 @ 10:44)  A1C with Estimated Average Glucose Result: 8.1 % (04-23-22 @ 13:17)  A1C with Estimated Average Glucose Result: 8.3 % (04-06-22 @ 09:45)

## 2022-09-13 NOTE — DIETITIAN INITIAL EVALUATION ADULT - DIET TYPE
low sodium/no concentrated potassium/no concentrated phosphorus/soft and bite-sized/consistent carbohydrate renal with evening snacks/mildly thick liquids

## 2022-09-13 NOTE — PROGRESS NOTE ADULT - SUBJECTIVE AND OBJECTIVE BOX
Patient is a 71y Female whom presented to the hospital with esrd on hd     PAST MEDICAL & SURGICAL HISTORY:  Diabetes mellitus II      HTN (hypertension)      h/o Anxiety attack      Depression      h/o Myocardial infarct 2007      CAD (coronary artery disease)      h/o Hepatitis A 1969  currently resolved, no symptoms      PAD (peripheral artery disease)      Murmur, cardiac      h/o Smoking  quitted 3/2012      CRF (chronic renal failure), unspecified stage      Dialysis patient      Anemia secondary to renal failure      HTN (hypertension)      Osteomyelitis      ESRD on dialysis      Falls      Ataxia      coronary stent 2007      s/p Ovarian cyst removal      s/p surgical removal of benign Skin lesion epigastric area      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          MEDICATIONS  (STANDING):  aspirin enteric coated 81 milliGRAM(s) Oral daily  atorvastatin 40 milliGRAM(s) Oral at bedtime  calcium acetate 667 milliGRAM(s) Oral three times a day with meals  cloNIDine 0.1 milliGRAM(s) Oral two times a day  dextrose 5%. 1000 milliLiter(s) (50 mL/Hr) IV Continuous <Continuous>  dextrose 5%. 1000 milliLiter(s) (100 mL/Hr) IV Continuous <Continuous>  dextrose 50% Injectable 25 Gram(s) IV Push once  dextrose 50% Injectable 12.5 Gram(s) IV Push once  dextrose 50% Injectable 25 Gram(s) IV Push once  glucagon  Injectable 1 milliGRAM(s) IntraMuscular once  heparin  Injectable (Preservative-Free) 5000 Unit(s) SubCutaneous two times a day  imipramine 50 milliGRAM(s) Oral daily  insulin lispro (ADMELOG) corrective regimen sliding scale   SubCutaneous three times a day before meals  lactobacillus acidophilus 1 Tablet(s) Oral three times a day  metoprolol tartrate 100 milliGRAM(s) Oral every 12 hours  multivitamin 1 Tablet(s) Oral daily  pantoprazole    Tablet 40 milliGRAM(s) Oral before breakfast  risperiDONE   Tablet 0.5 milliGRAM(s) Oral two times a day  senna 2 Tablet(s) Oral at bedtime  zinc sulfate 220 milliGRAM(s) Oral daily      Allergies    latex (Unknown)  No Known Drug Allergies    Intolerances        SOCIAL HISTORY:  Denies ETOh,Smoking,     FAMILY HISTORY:      REVIEW OF SYSTEMS:    CONSTITUTIONAL: No weakness, fevers or chills  RESPIRATORY: No cough, wheezing, hemoptysis; No shortness of breath  CARDIOVASCULAR: No chest pain or palpitations  GASTROINTESTINAL: No abdominal or epigastric pain. No nausea, vomiting,     No diarrhea or constipation. No melena   SKIN: dry      VITAL:  T(C): , Max: 36.7 (09-12-22 @ 08:19)  T(F): , Max: 98.1 (09-12-22 @ 08:19)  HR: 78 (09-12-22 @ 15:44)  BP: 129/68 (09-12-22 @ 15:44)  BP(mean): --  RR: 18 (09-12-22 @ 15:44)  SpO2: 99% (09-12-22 @ 15:44)  Wt(kg): --    I and O's:    Height (cm): 175.3 (09-12 @ 15:44), 175.3 (09-12 @ 08:19)  Weight (kg): 57.2 (09-12 @ 15:44), 56.7 (09-12 @ 08:19)  BMI (kg/m2): 18.6 (09-12 @ 15:44), 18.5 (09-12 @ 08:19)  BSA (m2): 1.7 (09-12 @ 15:44), 1.69 (09-12 @ 08:19)    PHYSICAL EXAM:    Constitutional: NAD  HEENT: conjunctive   clear   Neck:  No JVD  Respiratory: CTAB  Cardiovascular: S1 and S2  Gastrointestinal: BS+, soft, NT/ND  Extremities: No peripheral edema    LABS:                        9.3    10.67 )-----------( 208      ( 12 Sep 2022 09:24 )             29.0     09-12    135  |  94<L>  |  101<H>  ----------------------------<  187<H>  5.5<H>   |  32<H>  |  7.52<H>    Ca    10.0      12 Sep 2022 09:24    TPro  7.0  /  Alb  3.3  /  TBili  0.3  /  DBili  x   /  AST  16  /  ALT  21  /  AlkPhos  116  09-12      Urine Studies:          RADIOLOGY & ADDITIONAL STUDIES:

## 2022-09-13 NOTE — CONSULT NOTE ADULT - SUBJECTIVE AND OBJECTIVE BOX
HPI:  71y year old Female seen at [facility] TELE 317 W1 for left ankle wound and right foot wound. Patient states that the wound started 3 weeks ago. states that she has no pain in the wounds and that she has neuropathy in both feet. Denies any drainage or bleeding. Denies any fever, chills, nausea, vomiting, chest pain, shortness of breath, or calf pain at this time.    REVIEW OF SYSTEMS    PAST MEDICAL & SURGICAL HISTORY:  Diabetes mellitus II      HTN (hypertension)      h/o Anxiety attack      Depression      h/o Myocardial infarct 2007      CAD (coronary artery disease)      h/o Hepatitis A 1969  currently resolved, no symptoms      PAD (peripheral artery disease)      Murmur, cardiac      h/o Smoking  quitted 3/2012      CRF (chronic renal failure), unspecified stage      Dialysis patient      Anemia secondary to renal failure      HTN (hypertension)      Osteomyelitis      ESRD on dialysis      Falls      Ataxia      coronary stent 2007      s/p Ovarian cyst removal      s/p surgical removal of benign Skin lesion epigastric area      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          Allergies    Drug Allergies Not Recorded  latex (Hives)  latex (Unknown)  No Known Drug Allergies    Intolerances        MEDICATIONS  (STANDING):  aspirin enteric coated 81 milliGRAM(s) Oral daily  atorvastatin 40 milliGRAM(s) Oral at bedtime  calcium acetate 667 milliGRAM(s) Oral three times a day with meals  cloNIDine 0.1 milliGRAM(s) Oral two times a day  dextrose 5%. 1000 milliLiter(s) (50 mL/Hr) IV Continuous <Continuous>  dextrose 5%. 1000 milliLiter(s) (100 mL/Hr) IV Continuous <Continuous>  dextrose 50% Injectable 25 Gram(s) IV Push once  dextrose 50% Injectable 12.5 Gram(s) IV Push once  dextrose 50% Injectable 25 Gram(s) IV Push once  glucagon  Injectable 1 milliGRAM(s) IntraMuscular once  heparin  Injectable (Preservative-Free) 5000 Unit(s) SubCutaneous two times a day  imipramine 50 milliGRAM(s) Oral daily  insulin lispro (ADMELOG) corrective regimen sliding scale   SubCutaneous three times a day before meals  lactobacillus acidophilus 1 Tablet(s) Oral three times a day  metoprolol tartrate 100 milliGRAM(s) Oral every 12 hours  multivitamin 1 Tablet(s) Oral daily  nystatin Powder 1 Application(s) Topical two times a day  pantoprazole    Tablet 40 milliGRAM(s) Oral before breakfast  risperiDONE   Tablet 0.5 milliGRAM(s) Oral two times a day  senna 2 Tablet(s) Oral at bedtime  zinc sulfate 220 milliGRAM(s) Oral daily    MEDICATIONS  (PRN):  acetaminophen     Tablet .. 650 milliGRAM(s) Oral every 6 hours PRN Temp greater or equal to 38C (100.4F), Mild Pain (1 - 3)  aluminum hydroxide/magnesium hydroxide/simethicone Suspension 30 milliLiter(s) Oral every 4 hours PRN Dyspepsia  bisacodyl Suppository 10 milliGRAM(s) Rectal daily PRN Constipation  dextrose Oral Gel 15 Gram(s) Oral once PRN Blood Glucose LESS THAN 70 milliGRAM(s)/deciliter  melatonin 3 milliGRAM(s) Oral at bedtime PRN Insomnia  ondansetron Injectable 4 milliGRAM(s) IV Push every 8 hours PRN Nausea and/or Vomiting  traMADol 50 milliGRAM(s) Oral three times a day PRN Moderate Pain (4 - 6)      Social History:      FAMILY HISTORY:      Vital Signs Last 24 Hrs  T(C): 36.6 (13 Sep 2022 13:33), Max: 37.6 (13 Sep 2022 04:44)  T(F): 97.9 (13 Sep 2022 13:33), Max: 99.6 (13 Sep 2022 04:44)  HR: 65 (13 Sep 2022 13:33) (65 - 92)  BP: 177/74 (13 Sep 2022 17:47) (137/70 - 177/74)  BP(mean): --  RR: 17 (13 Sep 2022 13:33) (16 - 18)  SpO2: 95% (13 Sep 2022 13:33) (94% - 95%)    Parameters below as of 13 Sep 2022 13:33  Patient On (Oxygen Delivery Method): room air        PHYSICAL EXAM:  Vascular: DP & PT Non palpable bilaterally, Capillary refill 3 seconds, Digital hair present bilaterally  Neurological: Light touch sensation diminished bilaterally  Musculoskeletal: 4/5 strength in all quadrants bilaterally, AJ & STJ ROM intact  Dermatological :left foot: 2x1.3x0.9 cm ulceration noted to the medial left ankle, fibrotic wound bed, probe to bone, mild periwound erythema, mild fluctuance, no malodor, 1cc purulence was appreciated no proximal streaking at this time  Right wound: Open wound at the stump of the amputated hallux, right foot, 3x1,3x 1.2 cm, fibronecrotic base, probe to bone, mild periwound erythema, no fluctuance, no malodor, 1cc purulence was   Right upper leg wound 1x1 cm granular base.      CBC Full  -  ( 13 Sep 2022 07:28 )  WBC Count : 10.03 K/uL  RBC Count : 2.84 M/uL  Hemoglobin : 8.7 g/dL  Hematocrit : 26.3 %  Platelet Count - Automated : 209 K/uL  Mean Cell Volume : 92.6 fl  Mean Cell Hemoglobin : 30.6 pg  Mean Cell Hemoglobin Concentration : 33.1 gm/dL  Auto Neutrophil # : 7.18 K/uL  Auto Lymphocyte # : 1.03 K/uL  Auto Monocyte # : 1.28 K/uL  Auto Eosinophil # : 0.42 K/uL  Auto Basophil # : 0.06 K/uL  Auto Neutrophil % : 71.5 %  Auto Lymphocyte % : 10.3 %  Auto Monocyte % : 12.8 %  Auto Eosinophil % : 4.2 %  Auto Basophil % : 0.6 %        PT/INR - ( 13 Sep 2022 07:28 )   PT: 10.7 sec;   INR: 0.92 ratio                 Imaging:   pending

## 2022-09-13 NOTE — CONSULT NOTE ADULT - SUBJECTIVE AND OBJECTIVE BOX
Vascular Attending:  Dr Miller      HPI:  Patient was brought to [facility] ED from [facility] for fall today.  As per pt, pt had to go to bathroom, but wheel chair was unreachable, so pt ambulated to bathroom, had one diarrheal episode , got weaker, and slid to the floor. pt denies any pain in the ED.  No head trauma, headache or neck pain.  pt is dnr .Recent admission 07/14/2022  -71 year old female with PMH of Afib (not on AC for hx of multiple falls), T2DM, HTN, HLD, ESRD on HD (MWF), CHF, CAD s/p CABG, PAD S/P R-->L bifem-fem BK pop bypass, recent fempop bypass (6/21/2022), and partial ray amputation right great toe (6/22/2022) Pathology Final Diagnosis  06/23/22 1. Right hallux -Acute and chronic osteomyelitis. -Gangrenous skin and soft tissue. 2. Right first metatarsal/clean margin: -Minimal chronic osteomyelitis. Was on Unasyn to complete 6 weeks on 7/26 - Wound culture with Klebsiella, enterobacter, and also Stenotrophomonas but unclear if real infection .Followed  by ID at [facility]   - Vascular surgery and podiatry follow up requested since patient was supposed to be scheduled for followup within next 1-2 weeks    (12 Sep 2022 19:25)    Pt well known to vascular surgery and denies any R foot pain, headache, speech or visual disturbance. Vascular consulted for followup to RLE bypass suyrgery      PAST MEDICAL & SURGICAL HISTORY:  Diabetes mellitus II  HTN (hypertension)  h/o Anxiety attack  Depression  h/o Myocardial infarct 2007  CAD (coronary artery disease)  h/o Hepatitis A 1969  PAD (peripheral artery disease)  Murmur, cardiac  h/o Smoking  quitted 3/2012  CRF (chronic renal failure), unspecified stage  Dialysis patient  Anemia secondary to renal failure  HTN (hypertension)  Osteomyelitis  ESRD on dialysis  Falls  Ataxia  coronary stent 2007  s/p Ovarian cyst removal  s/p surgical removal of benign Skin lesion epigastric area  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  R fem-pop bypass        REVIEW OF SYSTEMS-as above otherwise negative      MEDICATIONS  (STANDING):  aspirin enteric coated 81 milliGRAM(s) Oral daily  atorvastatin 40 milliGRAM(s) Oral at bedtime  calcium acetate 667 milliGRAM(s) Oral three times a day with meals  cloNIDine 0.1 milliGRAM(s) Oral two times a day  dextrose 5%. 1000 milliLiter(s) (50 mL/Hr) IV Continuous <Continuous>  dextrose 5%. 1000 milliLiter(s) (100 mL/Hr) IV Continuous <Continuous>  dextrose 50% Injectable 25 Gram(s) IV Push once  dextrose 50% Injectable 12.5 Gram(s) IV Push once  dextrose 50% Injectable 25 Gram(s) IV Push once  glucagon  Injectable 1 milliGRAM(s) IntraMuscular once  heparin  Injectable (Preservative-Free) 5000 Unit(s) SubCutaneous two times a day  imipramine 50 milliGRAM(s) Oral daily  insulin lispro (ADMELOG) corrective regimen sliding scale   SubCutaneous three times a day before meals  lactobacillus acidophilus 1 Tablet(s) Oral three times a day  metoprolol tartrate 100 milliGRAM(s) Oral every 12 hours  multivitamin 1 Tablet(s) Oral daily  nystatin Powder 1 Application(s) Topical two times a day  pantoprazole    Tablet 40 milliGRAM(s) Oral before breakfast  risperiDONE   Tablet 0.5 milliGRAM(s) Oral two times a day  senna 2 Tablet(s) Oral at bedtime  zinc sulfate 220 milliGRAM(s) Oral daily    MEDICATIONS  (PRN):  acetaminophen     Tablet .. 650 milliGRAM(s) Oral every 6 hours PRN Temp greater or equal to 38C (100.4F), Mild Pain (1 - 3)  aluminum hydroxide/magnesium hydroxide/simethicone Suspension 30 milliLiter(s) Oral every 4 hours PRN Dyspepsia  bisacodyl Suppository 10 milliGRAM(s) Rectal daily PRN Constipation  dextrose Oral Gel 15 Gram(s) Oral once PRN Blood Glucose LESS THAN 70 milliGRAM(s)/deciliter  melatonin 3 milliGRAM(s) Oral at bedtime PRN Insomnia  ondansetron Injectable 4 milliGRAM(s) IV Push every 8 hours PRN Nausea and/or Vomiting  traMADol 50 milliGRAM(s) Oral three times a day PRN Moderate Pain (4 - 6)      Allergies  latex (Unknown)  No Known Drug Allergies    Vital Signs Last 24 Hrs  T(C): 37.6 (13 Sep 2022 04:44), Max: 37.6 (13 Sep 2022 04:44)  T(F): 99.6 (13 Sep 2022 04:44), Max: 99.6 (13 Sep 2022 04:44)  HR: 76 (13 Sep 2022 04:44) (62 - 92)  BP: 137/70 (13 Sep 2022 04:44) (129/68 - 180/72)  BP(mean): --  RR: 18 (13 Sep 2022 04:44) (16 - 18)  SpO2: 95% (13 Sep 2022 04:44) (94% - 99%)    Parameters below as of 13 Sep 2022 04:44  Patient On (Oxygen Delivery Method): room air        PHYSICAL EXAM:  Constitutional: Thin F in NAD  Eyes: PERRL  ENMT: WNL  Neck: No JVD  Respiratory: diminished at bases  Cardiovascular: normal S1, S2  Gastrointestinal: soft, ND, NT, + BS  Extremities: R groin and thigh incisions well healed; mild contracture of knee. Great toe amp site with fibrinous base, no drainage. + DP and PT signals  Neurological: Awake, MUHAMMAD X 4=    LABS:                        8.7    10.03 )-----------( 209      ( 13 Sep 2022 07:28 )             26.3     09-13    132<L>  |  93<L>  |  38<H>  ----------------------------<  223<H>  5.6<H>   |  32<H>  |  4.20<H>    Ca    8.9      13 Sep 2022 07:28    TPro  5.9<L>  /  Alb  2.7<L>  /  TBili  0.4  /  DBili  x   /  AST  14<L>  /  ALT  15  /  AlkPhos  111  09-13    PT/INR - ( 13 Sep 2022 07:28 )   PT: 10.7 sec;   INR: 0.92 ratio           RADIOLOGY & ADDITIONAL STUDIES    ACC: [id number] EXAM: CT BRAIN    PROCEDURE DATE: 09/12/2022        INTERPRETATION: CT HEAD    CLINICAL HISTORY: s/p fall    TECHNIQUE:  Noncontrast CT. Axial Acquisition. Sagittal and coronal reformations.    COMPARISON:  Compared to study dated 7/12/2022    FINDINGS:  * HEMORRHAGE: No evidence of intracranial hemorrhage.  * BRAIN PARENCHYMA: Moderate atrophy. Chronic small right cerebellar infarct. Moderate periventricular white matter ischemic changes. No mass or mass effect.  * VENTRICLES / SHIFT: No hydrocephalus. No midline shift.  * EXTRA-AXIAL / BASAL CISTERNS: No extra-axial mass. Basal cisterns preserved. Atherosclerotic calcifications of the cavernous internal carotid arteries.  * CALVARIUM AND EXTRACRANIAL SOFT TISSUES: No depressed calvarial fracture.  * SINUSES, ORBITS, MASTOIDS: The visualized paranasal sinuses and mastoid air cells are well aerated.    IMPRESSION:  NO EVIDENCE OF AN ACUTE TRAUMATIC INTRACRANIAL INJURY.  ACC: [id number] EXAM: CT BRAIN    PROCEDURE DATE: 09/12/2022        INTERPRETATION: CT HEAD    CLINICAL HISTORY: s/p fall    TECHNIQUE:  Noncontrast CT. Axial Acquisition. Sagittal and coronal reformations.    COMPARISON:  Compared to study dated 7/12/2022    FINDINGS:  * HEMORRHAGE: No evidence of intracranial hemorrhage.  * BRAIN PARENCHYMA: Moderate atrophy. Chronic small right cerebellar infarct. Moderate periventricular white matter ischemic changes. No mass or mass effect.  * VENTRICLES / SHIFT: No hydrocephalus. No midline shift.  * EXTRA-AXIAL / BASAL CISTERNS: No extra-axial mass. Basal cisterns preserved. Atherosclerotic calcifications of the cavernous internal carotid arteries.  * CALVARIUM AND EXTRACRANIAL SOFT TISSUES: No depressed calvarial fracture.  * SINUSES, ORBITS, MASTOIDS: The visualized paranasal sinuses and mastoid air cells are well aerated.    IMPRESSION:  NO EVIDENCE OF AN ACUTE TRAUMATIC INTRACRANIAL INJURY.     Vascular Attending:  Dr Miller      HPI:  Patient was brought to [place] ED from [facility] for fall today.  As per pt, pt had to go to bathroom, but wheel chair was unreachable, so pt ambulated to bathroom, had one diarrheal episode , got weaker, and slid to the floor. pt denies any pain in the ED.  No head trauma, headache or neck pain.  pt is dnr .Recent admission 07/14/2022  -71 year old female with PMH of Afib (not on AC for hx of multiple falls), T2DM, HTN, HLD, ESRD on HD (MWF), CHF, CAD s/p CABG, PAD S/P R-->L bifem-fem BK pop bypass, recent fempop bypass (6/21/2022), and partial ray amputation right great toe (6/22/2022) Pathology Final Diagnosis  06/23/22 1. Right hallux -Acute and chronic osteomyelitis. -Gangrenous skin and soft tissue. 2. Right first metatarsal/clean margin: -Minimal chronic osteomyelitis. Was on Unasyn to complete 6 weeks on 7/26 - Wound culture with Klebsiella, enterobacter, and also Stenotrophomonas but unclear if real infection .Followed  by ID at [facility]   - Vascular surgery and podiatry follow up requested since patient was supposed to be scheduled for followup within next 1-2 weeks    (12 Sep 2022 19:25)    Pt well known to vascular surgery and denies any R foot pain, headache, speech or visual disturbance. Vascular consulted for followup to RLE bypass suyrgery      PAST MEDICAL & SURGICAL HISTORY:  Diabetes mellitus II  HTN (hypertension)  h/o Anxiety attack  Depression  h/o Myocardial infarct 2007  CAD (coronary artery disease)  h/o Hepatitis A 1969  PAD (peripheral artery disease)  Murmur, cardiac  h/o Smoking  quitted 3/2012  CRF (chronic renal failure), unspecified stage  Dialysis patient  Anemia secondary to renal failure  HTN (hypertension)  Osteomyelitis  ESRD on dialysis  Falls  Ataxia  coronary stent 2007  s/p Ovarian cyst removal  s/p surgical removal of benign Skin lesion epigastric area  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  R fem-pop bypass        REVIEW OF SYSTEMS-as above otherwise negative      MEDICATIONS  (STANDING):  aspirin enteric coated 81 milliGRAM(s) Oral daily  atorvastatin 40 milliGRAM(s) Oral at bedtime  calcium acetate 667 milliGRAM(s) Oral three times a day with meals  cloNIDine 0.1 milliGRAM(s) Oral two times a day  dextrose 5%. 1000 milliLiter(s) (50 mL/Hr) IV Continuous <Continuous>  dextrose 5%. 1000 milliLiter(s) (100 mL/Hr) IV Continuous <Continuous>  dextrose 50% Injectable 25 Gram(s) IV Push once  dextrose 50% Injectable 12.5 Gram(s) IV Push once  dextrose 50% Injectable 25 Gram(s) IV Push once  glucagon  Injectable 1 milliGRAM(s) IntraMuscular once  heparin  Injectable (Preservative-Free) 5000 Unit(s) SubCutaneous two times a day  imipramine 50 milliGRAM(s) Oral daily  insulin lispro (ADMELOG) corrective regimen sliding scale   SubCutaneous three times a day before meals  lactobacillus acidophilus 1 Tablet(s) Oral three times a day  metoprolol tartrate 100 milliGRAM(s) Oral every 12 hours  multivitamin 1 Tablet(s) Oral daily  nystatin Powder 1 Application(s) Topical two times a day  pantoprazole    Tablet 40 milliGRAM(s) Oral before breakfast  risperiDONE   Tablet 0.5 milliGRAM(s) Oral two times a day  senna 2 Tablet(s) Oral at bedtime  zinc sulfate 220 milliGRAM(s) Oral daily    MEDICATIONS  (PRN):  acetaminophen     Tablet .. 650 milliGRAM(s) Oral every 6 hours PRN Temp greater or equal to 38C (100.4F), Mild Pain (1 - 3)  aluminum hydroxide/magnesium hydroxide/simethicone Suspension 30 milliLiter(s) Oral every 4 hours PRN Dyspepsia  bisacodyl Suppository 10 milliGRAM(s) Rectal daily PRN Constipation  dextrose Oral Gel 15 Gram(s) Oral once PRN Blood Glucose LESS THAN 70 milliGRAM(s)/deciliter  melatonin 3 milliGRAM(s) Oral at bedtime PRN Insomnia  ondansetron Injectable 4 milliGRAM(s) IV Push every 8 hours PRN Nausea and/or Vomiting  traMADol 50 milliGRAM(s) Oral three times a day PRN Moderate Pain (4 - 6)      Allergies  latex (Unknown)  No Known Drug Allergies    Vital Signs Last 24 Hrs  T(C): 37.6 (13 Sep 2022 04:44), Max: 37.6 (13 Sep 2022 04:44)  T(F): 99.6 (13 Sep 2022 04:44), Max: 99.6 (13 Sep 2022 04:44)  HR: 76 (13 Sep 2022 04:44) (62 - 92)  BP: 137/70 (13 Sep 2022 04:44) (129/68 - 180/72)  BP(mean): --  RR: 18 (13 Sep 2022 04:44) (16 - 18)  SpO2: 95% (13 Sep 2022 04:44) (94% - 99%)    Parameters below as of 13 Sep 2022 04:44  Patient On (Oxygen Delivery Method): room air        PHYSICAL EXAM:  Constitutional: Thin F in NAD  Eyes: PERRL  ENMT: WNL  Neck: No JVD  Respiratory: diminished at bases  Cardiovascular: normal S1, S2  Gastrointestinal: soft, ND, NT, + BS  Extremities: R groin and thigh incisions well healed; mild contracture of knee. Great toe amp site with fibrinous base, no drainage. + DP and PT signals  Neurological: Awake, MUHAMMAD X 4=    LABS:                        8.7    10.03 )-----------( 209      ( 13 Sep 2022 07:28 )             26.3     09-13    132<L>  |  93<L>  |  38<H>  ----------------------------<  223<H>  5.6<H>   |  32<H>  |  4.20<H>    Ca    8.9      13 Sep 2022 07:28    TPro  5.9<L>  /  Alb  2.7<L>  /  TBili  0.4  /  DBili  x   /  AST  14<L>  /  ALT  15  /  AlkPhos  111  09-13    PT/INR - ( 13 Sep 2022 07:28 )   PT: 10.7 sec;   INR: 0.92 ratio           RADIOLOGY & ADDITIONAL STUDIES    ACC: [id number] EXAM: CT BRAIN    PROCEDURE DATE: 09/12/2022        INTERPRETATION: CT HEAD    CLINICAL HISTORY: s/p fall    TECHNIQUE:  Noncontrast CT. Axial Acquisition. Sagittal and coronal reformations.    COMPARISON:  Compared to study dated 7/12/2022    FINDINGS:  * HEMORRHAGE: No evidence of intracranial hemorrhage.  * BRAIN PARENCHYMA: Moderate atrophy. Chronic small right cerebellar infarct. Moderate periventricular white matter ischemic changes. No mass or mass effect.  * VENTRICLES / SHIFT: No hydrocephalus. No midline shift.  * EXTRA-AXIAL / BASAL CISTERNS: No extra-axial mass. Basal cisterns preserved. Atherosclerotic calcifications of the cavernous internal carotid arteries.  * CALVARIUM AND EXTRACRANIAL SOFT TISSUES: No depressed calvarial fracture.  * SINUSES, ORBITS, MASTOIDS: The visualized paranasal sinuses and mastoid air cells are well aerated.    IMPRESSION:  NO EVIDENCE OF AN ACUTE TRAUMATIC INTRACRANIAL INJURY.

## 2022-09-13 NOTE — DIETITIAN INITIAL EVALUATION ADULT - PERTINENT MEDS FT
MEDICATIONS  (STANDING):  aspirin enteric coated 81 milliGRAM(s) Oral daily  atorvastatin 40 milliGRAM(s) Oral at bedtime  calcium acetate 667 milliGRAM(s) Oral three times a day with meals  cloNIDine 0.1 milliGRAM(s) Oral two times a day  dextrose 5%. 1000 milliLiter(s) (50 mL/Hr) IV Continuous <Continuous>  dextrose 5%. 1000 milliLiter(s) (100 mL/Hr) IV Continuous <Continuous>  dextrose 50% Injectable 25 Gram(s) IV Push once  dextrose 50% Injectable 12.5 Gram(s) IV Push once  dextrose 50% Injectable 25 Gram(s) IV Push once  glucagon  Injectable 1 milliGRAM(s) IntraMuscular once  heparin  Injectable (Preservative-Free) 5000 Unit(s) SubCutaneous two times a day  imipramine 50 milliGRAM(s) Oral daily  insulin lispro (ADMELOG) corrective regimen sliding scale   SubCutaneous three times a day before meals  lactobacillus acidophilus 1 Tablet(s) Oral three times a day  metoprolol tartrate 100 milliGRAM(s) Oral every 12 hours  multivitamin 1 Tablet(s) Oral daily  nystatin Powder 1 Application(s) Topical two times a day  pantoprazole    Tablet 40 milliGRAM(s) Oral before breakfast  risperiDONE   Tablet 0.5 milliGRAM(s) Oral two times a day  senna 2 Tablet(s) Oral at bedtime  zinc sulfate 220 milliGRAM(s) Oral daily    MEDICATIONS  (PRN):  acetaminophen     Tablet .. 650 milliGRAM(s) Oral every 6 hours PRN Temp greater or equal to 38C (100.4F), Mild Pain (1 - 3)  aluminum hydroxide/magnesium hydroxide/simethicone Suspension 30 milliLiter(s) Oral every 4 hours PRN Dyspepsia  bisacodyl Suppository 10 milliGRAM(s) Rectal daily PRN Constipation  dextrose Oral Gel 15 Gram(s) Oral once PRN Blood Glucose LESS THAN 70 milliGRAM(s)/deciliter  melatonin 3 milliGRAM(s) Oral at bedtime PRN Insomnia  ondansetron Injectable 4 milliGRAM(s) IV Push every 8 hours PRN Nausea and/or Vomiting  traMADol 50 milliGRAM(s) Oral three times a day PRN Moderate Pain (4 - 6)

## 2022-09-13 NOTE — DIETITIAN INITIAL EVALUATION ADULT - NS FNS REASON FOR WEIGHT CHANG
Patient reports having difficulty eating due to dental issues had in the past leading to 50 pound weight loss, but appetite has now improved./other (specify)

## 2022-09-13 NOTE — DIETITIAN INITIAL EVALUATION ADULT - NSFNSPHYEXAMSKINFT_GEN_A_CORE
Pressure Injury 1: Right:,medial,ankle, Stage II  Pressure Injury 2: Right:,knee,lateral, Stage II

## 2022-09-13 NOTE — DIETITIAN INITIAL EVALUATION ADULT - NS FNS DIET ORDER
Diet, Consistent Carbohydrate w/Evening Snack:   Soft and Bite Sized (SOFTBTSZ)  Mildly Thick Liquids (MILDTHICKLIQS)  For patients receiving Renal Replacement - No Protein Restr, No Conc K, No Conc Phos, Low Sodium (RENAL) (09-12-22 @ 17:33)

## 2022-09-13 NOTE — DIETITIAN INITIAL EVALUATION ADULT - PHYSICAL ASSESSMENT SCAPULA
----- Message from Gloria Pan sent at 1/5/2022 10:02 AM CST -----  Regarding: schedule procedure  Type:  Needs Medical Advice    Who Called: Sari Fernandez    Symptoms (please be specific): =-   How long has patient had these symptoms:  -  Pharmacy name and phone #:  -  Would the patient rather a call back or a response via [facility]?    Best Call Back Number: [phone]    Additional Information: pt needs to speak with Ms Rose about scheduling procedure          none

## 2022-09-13 NOTE — DIETITIAN INITIAL EVALUATION ADULT - OTHER INFO
PMH: Afib, TIIDM, HTN, HLD, ESRD on hemodialysis. CHF, recent bypass (6/21/22), partial ray amputation right great toe (6/22/22.)   Gangrenous skin and soft tissue, osteomylitis. Patient was on Unasyn to complete 6 weeks (7/26/22)  Wound culture with Klebsiella, enterobacter, and Stenotrophomonas   Anemia of chronic disease: well controlled. Monitoring B12, RBC and Folate

## 2022-09-14 DIAGNOSIS — D72.829 ELEVATED WHITE BLOOD CELL COUNT, UNSPECIFIED: ICD-10-CM

## 2022-09-14 DIAGNOSIS — T14.8XXA OTHER INJURY OF UNSPECIFIED BODY REGION, INITIAL ENCOUNTER: ICD-10-CM

## 2022-09-14 LAB
HBV SURFACE AB SER-ACNC: 69.2 MIU/ML — SIGNIFICANT CHANGE UP
HBV SURFACE AG SER-ACNC: SIGNIFICANT CHANGE UP
HCT VFR BLD CALC: 30.8 % — LOW (ref 34.5–45)
HCV AB S/CO SERPL IA: 0.22 S/CO — SIGNIFICANT CHANGE UP (ref 0–0.99)
HCV AB SERPL-IMP: SIGNIFICANT CHANGE UP
HGB BLD-MCNC: 10.1 G/DL — LOW (ref 11.5–15.5)
MCHC RBC-ENTMCNC: 30.6 PG — SIGNIFICANT CHANGE UP (ref 27–34)
MCHC RBC-ENTMCNC: 32.8 GM/DL — SIGNIFICANT CHANGE UP (ref 32–36)
MCV RBC AUTO: 93.3 FL — SIGNIFICANT CHANGE UP (ref 80–100)
NRBC # BLD: 0 /100 WBCS — SIGNIFICANT CHANGE UP (ref 0–0)
PLATELET # BLD AUTO: 215 K/UL — SIGNIFICANT CHANGE UP (ref 150–400)
RBC # BLD: 3.3 M/UL — LOW (ref 3.8–5.2)
RBC # FLD: 16.7 % — HIGH (ref 10.3–14.5)
WBC # BLD: 15.48 K/UL — HIGH (ref 3.8–10.5)
WBC # FLD AUTO: 15.48 K/UL — HIGH (ref 3.8–10.5)

## 2022-09-14 PROCEDURE — 99222 1ST HOSP IP/OBS MODERATE 55: CPT

## 2022-09-14 RX ORDER — INSULIN GLARGINE 100 [IU]/ML
8 INJECTION, SOLUTION SUBCUTANEOUS AT BEDTIME
Refills: 0 | Status: DISCONTINUED | OUTPATIENT
Start: 2022-09-14 | End: 2022-09-16

## 2022-09-14 RX ORDER — HEPARIN SODIUM 5000 [USP'U]/ML
5000 INJECTION INTRAVENOUS; SUBCUTANEOUS EVERY 12 HOURS
Refills: 0 | Status: DISCONTINUED | OUTPATIENT
Start: 2022-09-14 | End: 2022-09-17

## 2022-09-14 RX ORDER — ERYTHROPOIETIN 10000 [IU]/ML
10000 INJECTION, SOLUTION INTRAVENOUS; SUBCUTANEOUS
Refills: 0 | Status: DISCONTINUED | OUTPATIENT
Start: 2022-09-14 | End: 2022-09-17

## 2022-09-14 RX ADMIN — HEPARIN SODIUM 5000 UNIT(S): 5000 INJECTION INTRAVENOUS; SUBCUTANEOUS at 20:31

## 2022-09-14 RX ADMIN — ATORVASTATIN CALCIUM 40 MILLIGRAM(S): 80 TABLET, FILM COATED ORAL at 20:31

## 2022-09-14 RX ADMIN — Medication 81 MILLIGRAM(S): at 12:48

## 2022-09-14 RX ADMIN — Medication 100 MILLIGRAM(S): at 05:19

## 2022-09-14 RX ADMIN — Medication 667 MILLIGRAM(S): at 08:56

## 2022-09-14 RX ADMIN — PANTOPRAZOLE SODIUM 40 MILLIGRAM(S): 20 TABLET, DELAYED RELEASE ORAL at 05:20

## 2022-09-14 RX ADMIN — HEPARIN SODIUM 5000 UNIT(S): 5000 INJECTION INTRAVENOUS; SUBCUTANEOUS at 08:54

## 2022-09-14 RX ADMIN — Medication 1 TABLET(S): at 20:31

## 2022-09-14 RX ADMIN — Medication 2: at 08:55

## 2022-09-14 RX ADMIN — Medication 100 MILLIGRAM(S): at 20:31

## 2022-09-14 RX ADMIN — Medication 2: at 12:43

## 2022-09-14 RX ADMIN — Medication 1 TABLET(S): at 05:19

## 2022-09-14 RX ADMIN — Medication 0.1 MILLIGRAM(S): at 05:20

## 2022-09-14 RX ADMIN — Medication 650 MILLIGRAM(S): at 00:20

## 2022-09-14 RX ADMIN — NYSTATIN CREAM 1 APPLICATION(S): 100000 CREAM TOPICAL at 20:32

## 2022-09-14 RX ADMIN — Medication 0.1 MILLIGRAM(S): at 20:31

## 2022-09-14 RX ADMIN — Medication 1 TABLET(S): at 13:39

## 2022-09-14 RX ADMIN — SENNA PLUS 2 TABLET(S): 8.6 TABLET ORAL at 20:31

## 2022-09-14 RX ADMIN — Medication 667 MILLIGRAM(S): at 20:31

## 2022-09-14 RX ADMIN — ZINC SULFATE TAB 220 MG (50 MG ZINC EQUIVALENT) 220 MILLIGRAM(S): 220 (50 ZN) TAB at 12:48

## 2022-09-14 RX ADMIN — RISPERIDONE 0.5 MILLIGRAM(S): 4 TABLET ORAL at 20:31

## 2022-09-14 RX ADMIN — INSULIN GLARGINE 8 UNIT(S): 100 INJECTION, SOLUTION SUBCUTANEOUS at 22:25

## 2022-09-14 RX ADMIN — Medication 1 TABLET(S): at 12:48

## 2022-09-14 RX ADMIN — Medication 667 MILLIGRAM(S): at 12:48

## 2022-09-14 RX ADMIN — RISPERIDONE 0.5 MILLIGRAM(S): 4 TABLET ORAL at 05:20

## 2022-09-14 RX ADMIN — Medication 50 MILLIGRAM(S): at 12:48

## 2022-09-14 NOTE — PROGRESS NOTE ADULT - SUBJECTIVE AND OBJECTIVE BOX
Patient is a 71y Female whom presented to the hospital with esrd on hd     PAST MEDICAL & SURGICAL HISTORY:  Diabetes mellitus II      HTN (hypertension)      h/o Anxiety attack      Depression      h/o Myocardial infarct 2007      CAD (coronary artery disease)      h/o Hepatitis A 1969  currently resolved, no symptoms      PAD (peripheral artery disease)      Murmur, cardiac      h/o Smoking  quitted 3/2012      CRF (chronic renal failure), unspecified stage      Dialysis patient      Anemia secondary to renal failure      HTN (hypertension)      Osteomyelitis      ESRD on dialysis      Falls      Ataxia      coronary stent 2007      s/p Ovarian cyst removal      s/p surgical removal of benign Skin lesion epigastric area      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          MEDICATIONS  (STANDING):  aspirin enteric coated 81 milliGRAM(s) Oral daily  atorvastatin 40 milliGRAM(s) Oral at bedtime  calcium acetate 667 milliGRAM(s) Oral three times a day with meals  cloNIDine 0.1 milliGRAM(s) Oral two times a day  dextrose 5%. 1000 milliLiter(s) (50 mL/Hr) IV Continuous <Continuous>  dextrose 5%. 1000 milliLiter(s) (100 mL/Hr) IV Continuous <Continuous>  dextrose 50% Injectable 25 Gram(s) IV Push once  dextrose 50% Injectable 12.5 Gram(s) IV Push once  dextrose 50% Injectable 25 Gram(s) IV Push once  glucagon  Injectable 1 milliGRAM(s) IntraMuscular once  heparin  Injectable (Preservative-Free) 5000 Unit(s) SubCutaneous two times a day  imipramine 50 milliGRAM(s) Oral daily  insulin lispro (ADMELOG) corrective regimen sliding scale   SubCutaneous three times a day before meals  lactobacillus acidophilus 1 Tablet(s) Oral three times a day  metoprolol tartrate 100 milliGRAM(s) Oral every 12 hours  multivitamin 1 Tablet(s) Oral daily  pantoprazole    Tablet 40 milliGRAM(s) Oral before breakfast  risperiDONE   Tablet 0.5 milliGRAM(s) Oral two times a day  senna 2 Tablet(s) Oral at bedtime  zinc sulfate 220 milliGRAM(s) Oral daily      Allergies    latex (Unknown)  No Known Drug Allergies    Intolerances        SOCIAL HISTORY:  Denies ETOh,Smoking,     FAMILY HISTORY:      REVIEW OF SYSTEMS:    CONSTITUTIONAL: No weakness, fevers or chills  RESPIRATORY: No cough, wheezing, hemoptysis; No shortness of breath  CARDIOVASCULAR: No chest pain or palpitations  GASTROINTESTINAL: No abdominal or epigastric pain. No nausea, vomiting,     No diarrhea or constipation. No melena   SKIN: dry                              10.1   15.48 )-----------( 215      ( 14 Sep 2022 08:20 )             30.8       CBC Full  -  ( 14 Sep 2022 08:20 )  WBC Count : 15.48 K/uL  RBC Count : 3.30 M/uL  Hemoglobin : 10.1 g/dL  Hematocrit : 30.8 %  Platelet Count - Automated : 215 K/uL  Mean Cell Volume : 93.3 fl  Mean Cell Hemoglobin : 30.6 pg  Mean Cell Hemoglobin Concentration : 32.8 gm/dL  Auto Neutrophil # : x  Auto Lymphocyte # : x  Auto Monocyte # : x  Auto Eosinophil # : x  Auto Basophil # : x  Auto Neutrophil % : x  Auto Lymphocyte % : x  Auto Monocyte % : x  Auto Eosinophil % : x  Auto Basophil % : x      09-14    132<L>  |  93<L>  |  67<H>  ----------------------------<  209<H>  5.8<H>   |  32<H>  |  6.40<H>    Ca    9.7      14 Sep 2022 14:00    TPro  5.9<L>  /  Alb  2.7<L>  /  TBili  0.4  /  DBili  x   /  AST  14<L>  /  ALT  15  /  AlkPhos  111  09-13      CAPILLARY BLOOD GLUCOSE      POCT Blood Glucose.: 108 mg/dL (14 Sep 2022 17:45)  POCT Blood Glucose.: 230 mg/dL (14 Sep 2022 12:05)  POCT Blood Glucose.: 245 mg/dL (14 Sep 2022 08:12)  POCT Blood Glucose.: 279 mg/dL (13 Sep 2022 21:36)      Vital Signs Last 24 Hrs  T(C): 36.9 (14 Sep 2022 16:10), Max: 37.4 (13 Sep 2022 20:23)  T(F): 98.4 (14 Sep 2022 16:10), Max: 99.4 (13 Sep 2022 20:23)  HR: 70 (14 Sep 2022 16:10) (60 - 74)  BP: 121/41 (14 Sep 2022 16:10) (115/69 - 151/66)  BP(mean): --  RR: 17 (14 Sep 2022 16:10) (16 - 19)  SpO2: 97% (14 Sep 2022 16:10) (93% - 97%)    Parameters below as of 14 Sep 2022 16:10  Patient On (Oxygen Delivery Method): room air            PT/INR - ( 13 Sep 2022 07:28 )   PT: 10.7 sec;   INR: 0.92 ratio             PHYSICAL EXAM:    Constitutional: NAD  HEENT: conjunctive   clear   Neck:  No JVD  Respiratory: CTAB  Cardiovascular: S1 and S2  Gastrointestinal: BS+, soft, NT/ND  Extremities: No peripheral edema

## 2022-09-14 NOTE — PROGRESS NOTE ADULT - SUBJECTIVE AND OBJECTIVE BOX
PROGRESS NOTE  Patient is a 71y old  Female who presents with a chief complaint of s/p fall (14 Sep 2022 14:48)  Chart and available morning labs /imaging are reviewed electronically , urgent issues addressed . More information  is being added upon completion of rounds , when more information is collected and management discussed with consultants , medical staff and social service/case management on the floor     OVERNIGHT  No new issues reported by medical staff . All above noted Patient is resting in a bed comfortably .Confused ,poor mentation .No distress noted     HPI:  Patient was brought to [place] ED from [facility] for fall today.  As per pt, pt had to go to bathroom, but wheel chair was unreachable, so pt ambulated to bathroom, had one diarrheal episode , got weaker, and slid to the floor. pt denies any pain in the ED.  No head trauma, headache or neck pain.  pt is dnr .Recent admission 07/14/2022  -71 year old female with PMH of Afib (not on AC for hx of multiple falls), T2DM, HTN, HLD, ESRD on HD (MWF), CHF, CAD s/p CABG, PAD S/P R-->L bifem-fem BK pop bypass, recent fempop bypass (6/21/2022), and partial ray amputation right great toe (6/22/2022) Pathology Final Diagnosis  06/23/22 1. Right hallux -Acute and chronic osteomyelitis. -Gangrenous skin and soft tissue. 2. Right first metatarsal/clean margin: -Minimal chronic osteomyelitis. Was on Unasyn to complete 6 weeks on 7/26 - Wound culture with Klebsiella, enterobacter, and also Stenotrophomonas but unclear if real infection .Followed  by ID at [facility]   - Vascular surgery and podiatry follow up requested since patient was supposed to be scheduled for followup within next 1-2 weeks    (12 Sep 2022 19:25)    PAST MEDICAL & SURGICAL HISTORY:  Diabetes mellitus II      HTN (hypertension)      h/o Anxiety attack      Depression      h/o Myocardial infarct 2007      CAD (coronary artery disease)      h/o Hepatitis A 1969  currently resolved, no symptoms      PAD (peripheral artery disease)      Murmur, cardiac      h/o Smoking  quitted 3/2012      CRF (chronic renal failure), unspecified stage      Dialysis patient      Anemia secondary to renal failure      HTN (hypertension)      Osteomyelitis      ESRD on dialysis      Falls      Ataxia      coronary stent 2007      s/p Ovarian cyst removal      s/p surgical removal of benign Skin lesion epigastric area      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          MEDICATIONS  (STANDING):  aspirin enteric coated 81 milliGRAM(s) Oral daily  atorvastatin 40 milliGRAM(s) Oral at bedtime  calcium acetate 667 milliGRAM(s) Oral three times a day with meals  cloNIDine 0.1 milliGRAM(s) Oral two times a day  dextrose 5%. 1000 milliLiter(s) (50 mL/Hr) IV Continuous <Continuous>  dextrose 5%. 1000 milliLiter(s) (100 mL/Hr) IV Continuous <Continuous>  dextrose 50% Injectable 25 Gram(s) IV Push once  dextrose 50% Injectable 12.5 Gram(s) IV Push once  dextrose 50% Injectable 25 Gram(s) IV Push once  glucagon  Injectable 1 milliGRAM(s) IntraMuscular once  heparin   Injectable 5000 Unit(s) SubCutaneous every 12 hours  imipramine 50 milliGRAM(s) Oral daily  insulin lispro (ADMELOG) corrective regimen sliding scale   SubCutaneous three times a day before meals  lactobacillus acidophilus 1 Tablet(s) Oral three times a day  metoprolol tartrate 100 milliGRAM(s) Oral every 12 hours  multivitamin 1 Tablet(s) Oral daily  nystatin Powder 1 Application(s) Topical two times a day  pantoprazole    Tablet 40 milliGRAM(s) Oral before breakfast  risperiDONE   Tablet 0.5 milliGRAM(s) Oral two times a day  senna 2 Tablet(s) Oral at bedtime  zinc sulfate 220 milliGRAM(s) Oral daily    MEDICATIONS  (PRN):  acetaminophen     Tablet .. 650 milliGRAM(s) Oral every 6 hours PRN Temp greater or equal to 38C (100.4F), Mild Pain (1 - 3)  aluminum hydroxide/magnesium hydroxide/simethicone Suspension 30 milliLiter(s) Oral every 4 hours PRN Dyspepsia  bisacodyl Suppository 10 milliGRAM(s) Rectal daily PRN Constipation  dextrose Oral Gel 15 Gram(s) Oral once PRN Blood Glucose LESS THAN 70 milliGRAM(s)/deciliter  melatonin 3 milliGRAM(s) Oral at bedtime PRN Insomnia  ondansetron Injectable 4 milliGRAM(s) IV Push every 8 hours PRN Nausea and/or Vomiting  traMADol 50 milliGRAM(s) Oral three times a day PRN Moderate Pain (4 - 6)      OBJECTIVE    T(C): 36.9 (09-14-22 @ 16:10), Max: 37.4 (09-13-22 @ 20:23)  HR: 70 (09-14-22 @ 16:10) (60 - 74)  BP: 121/41 (09-14-22 @ 16:10) (115/69 - 151/66)  RR: 17 (09-14-22 @ 16:10) (16 - 19)  SpO2: 97% (09-14-22 @ 16:10) (93% - 97%)  Wt(kg): --  I&O's Summary    13 Sep 2022 07:01  -  14 Sep 2022 07:00  --------------------------------------------------------  IN: 890 mL / OUT: 0 mL / NET: 890 mL          REVIEW OF SYSTEMS:  CONSTITUTIONAL: No fever, weight loss, or fatigue  EYES: No eye pain, visual disturbances, or discharge  ENMT:   No sinus or throat pain  NECK: No pain or stiffness  RESPIRATORY: No cough, wheezing, chills or hemoptysis; No shortness of breath  CARDIOVASCULAR: No chest pain, palpitations, dizziness, or leg swelling  GASTROINTESTINAL: No abdominal pain. No nausea, vomiting; No diarrhea or constipation. No melena or hematochezia.  GENITOURINARY: No dysuria, frequency, hematuria, or incontinence  NEUROLOGICAL: No headaches, memory loss, loss of strength, numbness, or tremors  SKIN: No itching, burning, rashes, or lesions   MUSCULOSKELETAL: No joint pain or swelling; No muscle, back, or extremity pain    PHYSICAL EXAM:  Appearance: NAD. VS past 24 hrs -as above   HEENT:   Moist oral mucosa. Conjunctiva clear b/l.   Neck : supple  Respiratory: Lungs CTAB.  Gastrointestinal:  Soft, nontender. No rebound. No rigidity. BS present	  Cardiovascular: RRR ,S1S2 present  Neurologic: Non-focal. Moving all extremities.  Extremities: No edema. No erythema. No calf tenderness.  Skin: No rashes, No ecchymoses, No cyanosis.	  wounds ,skin lesions-See skin assesment flow sheet   LABS:                        10.1   15.48 )-----------( 215      ( 14 Sep 2022 08:20 )             30.8     09-14    132<L>  |  93<L>  |  67<H>  ----------------------------<  209<H>  5.8<H>   |  32<H>  |  6.40<H>    Ca    9.7      14 Sep 2022 14:00    TPro  5.9<L>  /  Alb  2.7<L>  /  TBili  0.4  /  DBili  x   /  AST  14<L>  /  ALT  15  /  AlkPhos  111  09-13    CAPILLARY BLOOD GLUCOSE      POCT Blood Glucose.: 108 mg/dL (14 Sep 2022 17:45)  POCT Blood Glucose.: 230 mg/dL (14 Sep 2022 12:05)  POCT Blood Glucose.: 245 mg/dL (14 Sep 2022 08:12)  POCT Blood Glucose.: 279 mg/dL (13 Sep 2022 21:36)    PT/INR - ( 13 Sep 2022 07:28 )   PT: 10.7 sec;   INR: 0.92 ratio               RADIOLOGY & ADDITIONAL TESTS:   reviewed elctronically  ASSESSMENT/PLAN: 	    25 minutes aggregate time was spent on this visit, 50% visit time spent in care co-ordination with other attendings and counselling patient .I have discussed care plan with patient / HCP/family member ,who expressed understanding of problems treatment and their effect and side effects, alternatives in details. I have asked if they have any questions and concerns and appropriately addressed them to best of my ability.

## 2022-09-14 NOTE — PROGRESS NOTE ADULT - PROBLEM SELECTOR PLAN 1
1)left ankle wound   2)right post op open wound at the stump of the hallux  with possible OM  ·  seen by podiatry and ID   Recommend MRI b/l foot   IV abx -TBD   Vascular on board-no interventions   Possible surgical intervention, pending  MRI result.

## 2022-09-14 NOTE — PROGRESS NOTE ADULT - SUBJECTIVE AND OBJECTIVE BOX
Patient is a 71y Female with a known history of :  Fall [W19.XXXA]    ESRD on dialysis [N18.6]    Acute on chronic osteomyelitis [M86.10]    PVD (peripheral vascular disease) [I73.9]    DM (diabetes mellitus) [E11.9]    Prophylactic measure [Z29.9]    Infected open wound [T14.8XXA]      HPI:  Patient was brought to [place] ED from [facility] for fall today.  As per pt, pt had to go to bathroom, but wheel chair was unreachable, so pt ambulated to bathroom, had one diarrheal episode , got weaker, and slid to the floor. pt denies any pain in the ED.  No head trauma, headache or neck pain.  pt is dnr .Recent admission 07/14/2022  -71 year old female with PMH of Afib (not on AC for hx of multiple falls), T2DM, HTN, HLD, ESRD on HD (MWF), CHF, CAD s/p CABG, PAD S/P R-->L bifem-fem BK pop bypass, recent fempop bypass (6/21/2022), and partial ray amputation right great toe (6/22/2022) Pathology Final Diagnosis  06/23/22 1. Right hallux -Acute and chronic osteomyelitis. -Gangrenous skin and soft tissue. 2. Right first metatarsal/clean margin: -Minimal chronic osteomyelitis. Was on Unasyn to complete 6 weeks on 7/26 - Wound culture with Klebsiella, enterobacter, and also Stenotrophomonas but unclear if real infection .Followed  by ID at [facility]   - Vascular surgery and podiatry follow up requested since patient was supposed to be scheduled for followup within next 1-2 weeks    (12 Sep 2022 19:25)      REVIEW OF SYSTEMS:    CONSTITUTIONAL: No fever, weight loss, or fatigue  EYES: No eye pain, visual disturbances, or discharge  ENMT:  No difficulty hearing, tinnitus, vertigo; No sinus or throat pain  NECK: No pain or stiffness  BREASTS: No pain, masses, or nipple discharge  RESPIRATORY: No cough, wheezing, chills or hemoptysis; No shortness of breath  CARDIOVASCULAR: No chest pain, palpitations, dizziness, or leg swelling  GASTROINTESTINAL: No abdominal or epigastric pain. No nausea, vomiting, or hematemesis; No diarrhea or constipation. No melena or hematochezia.  GENITOURINARY: No dysuria, frequency, hematuria, or incontinence  NEUROLOGICAL: No headaches, memory loss, loss of strength, numbness, or tremors  SKIN: No itching, burning, rashes, or lesions   LYMPH NODES: No enlarged glands  ENDOCRINE: No heat or cold intolerance; No hair loss  MUSCULOSKELETAL: No joint pain or swelling; No muscle, back, or extremity pain  PSYCHIATRIC: No depression, anxiety, mood swings, or difficulty sleeping  HEME/LYMPH: No easy bruising, or bleeding gums  ALLERGY AND IMMUNOLOGIC: No hives or eczema    MEDICATIONS  (STANDING):  aspirin enteric coated 81 milliGRAM(s) Oral daily  atorvastatin 40 milliGRAM(s) Oral at bedtime  calcium acetate 667 milliGRAM(s) Oral three times a day with meals  cloNIDine 0.1 milliGRAM(s) Oral two times a day  dextrose 5%. 1000 milliLiter(s) (50 mL/Hr) IV Continuous <Continuous>  dextrose 5%. 1000 milliLiter(s) (100 mL/Hr) IV Continuous <Continuous>  dextrose 50% Injectable 25 Gram(s) IV Push once  dextrose 50% Injectable 12.5 Gram(s) IV Push once  dextrose 50% Injectable 25 Gram(s) IV Push once  glucagon  Injectable 1 milliGRAM(s) IntraMuscular once  heparin   Injectable 5000 Unit(s) SubCutaneous every 12 hours  imipramine 50 milliGRAM(s) Oral daily  insulin lispro (ADMELOG) corrective regimen sliding scale   SubCutaneous three times a day before meals  lactobacillus acidophilus 1 Tablet(s) Oral three times a day  metoprolol tartrate 100 milliGRAM(s) Oral every 12 hours  multivitamin 1 Tablet(s) Oral daily  nystatin Powder 1 Application(s) Topical two times a day  pantoprazole    Tablet 40 milliGRAM(s) Oral before breakfast  risperiDONE   Tablet 0.5 milliGRAM(s) Oral two times a day  senna 2 Tablet(s) Oral at bedtime  zinc sulfate 220 milliGRAM(s) Oral daily    MEDICATIONS  (PRN):  acetaminophen     Tablet .. 650 milliGRAM(s) Oral every 6 hours PRN Temp greater or equal to 38C (100.4F), Mild Pain (1 - 3)  aluminum hydroxide/magnesium hydroxide/simethicone Suspension 30 milliLiter(s) Oral every 4 hours PRN Dyspepsia  bisacodyl Suppository 10 milliGRAM(s) Rectal daily PRN Constipation  dextrose Oral Gel 15 Gram(s) Oral once PRN Blood Glucose LESS THAN 70 milliGRAM(s)/deciliter  melatonin 3 milliGRAM(s) Oral at bedtime PRN Insomnia  ondansetron Injectable 4 milliGRAM(s) IV Push every 8 hours PRN Nausea and/or Vomiting  traMADol 50 milliGRAM(s) Oral three times a day PRN Moderate Pain (4 - 6)      ALLERGIES: Drug Allergies Not Recorded  latex (Hives)  latex (Unknown)  No Known Drug Allergies      FAMILY HISTORY:      PHYSICAL EXAMINATION:  -----------------------------  T(C): 36.6 (09-14-22 @ 04:58), Max: 37.4 (09-13-22 @ 20:23)  HR: 68 (09-14-22 @ 04:58) (65 - 74)  BP: 150/68 (09-14-22 @ 04:58) (145/65 - 177/74)  RR: 17 (09-14-22 @ 04:58) (17 - 18)  SpO2: 93% (09-14-22 @ 04:58) (93% - 97%)  Wt(kg): --    09-13 @ 07:01  -  09-14 @ 07:00  --------------------------------------------------------  IN:    Oral Fluid: 890 mL  Total IN: 890 mL    OUT:  Total OUT: 0 mL    Total NET: 890 mL            VITALS  T(C): 36.6 (09-14-22 @ 04:58), Max: 37.4 (09-13-22 @ 20:23)  HR: 68 (09-14-22 @ 04:58) (65 - 74)  BP: 150/68 (09-14-22 @ 04:58) (145/65 - 177/74)  RR: 17 (09-14-22 @ 04:58) (17 - 18)  SpO2: 93% (09-14-22 @ 04:58) (93% - 97%)    Constitutional: well developed, normal appearance, well groomed, well nourished, no deformities and no acute distress.   Eyes: the conjunctiva exhibited no abnormalities and the eyelids demonstrated no xanthelasmas.   HEENT: normal oral mucosa, no oral pallor and no oral cyanosis.   Neck: normal jugular venous A waves present, normal jugular venous V waves present and no jugular venous wilson A waves.   Pulmonary: no respiratory distress, normal respiratory rhythm and effort, no accessory muscle use and lungs were clear to auscultation bilaterally.   Cardiovascular: heart rate and rhythm were normal, normal S1 and S2 and no murmur, gallop, rub, heave or thrill are present.   Abdomen: soft, non-tender, no hepato-splenomegaly and no abdominal mass palpated.   Musculoskeletal: the gait could not be assessed..   Extremities: no clubbing of the fingernails, no localized cyanosis, no petechial hemorrhages and no ischemic changes.   Skin: normal skin color and pigmentation, no rash, no venous stasis, no skin lesions, no skin ulcer and no xanthoma was observed.   Psychiatric: oriented to person, place, and time, the affect was normal, the mood was normal and not feeling anxious.     LABS:   --------  09-13    132<L>  |  93<L>  |  38<H>  ----------------------------<  223<H>  5.6<H>   |  32<H>  |  4.20<H>    Ca    8.9      13 Sep 2022 07:28    TPro  5.9<L>  /  Alb  2.7<L>  /  TBili  0.4  /  DBili  x   /  AST  14<L>  /  ALT  15  /  AlkPhos  111  09-13                         8.7    10.03 )-----------( 209      ( 13 Sep 2022 07:28 )             26.3     PT/INR - ( 13 Sep 2022 07:28 )   PT: 10.7 sec;   INR: 0.92 ratio                     RADIOLOGY:  -----------------    ECG:     ECHO:

## 2022-09-14 NOTE — CONSULT NOTE ADULT - CONSULT REQUESTED DATE/TIME
12-Sep-2022 16:50
13-Sep-2022 12:40
13-Sep-2022 10:08
12-Sep-2022 21:02
14-Sep-2022 14:49
14-Sep-2022 18:04

## 2022-09-14 NOTE — PROGRESS NOTE ADULT - PROBLEM SELECTOR PLAN 3
1)left ankle wound   2)right post op open wound at the stump of the hallux  with possible OM-Pathology Final Diagnosis  06/23/22 with chronic osteomyelitis in margines.   ·  seen by podiatry and ID   Recommend MRI b/l foot   IV abx -TBD   Vascular on board-no interventions   Possible surgical intervention, pending  MRI result.

## 2022-09-14 NOTE — CONSULT NOTE ADULT - SUBJECTIVE AND OBJECTIVE BOX
[facility]  INFECTIOUS DISEASES   [address] [place] [address]  Tel: [phone]     Fax: [phone]  ======================================================  MD Zita Alejandra Kaushal, MD Cho, Michelle, MD   ======================================================    [id number]  SHILO KOHLER     CC: fall, had R foot wound infection     HPI:  72 yo woman with PMH of HTN, DM2, ESRD on HD, Afib, CHF, CAD s/p CABG, PAD S/P R-->L bifem-fem/Pop bypass, recent R fem/pop bypass on 6/21/2022 and partial ray amputation right great toe 6/23/2022   was admitted from [facility] after a fall. She states that tried to go to bathroom by herself, had diarrhea and felt weak and slid to the floor.   6/21 s/p Femoral popliteal bypass with prosthetic graft  6/23 s/p R 1st toe ray amputation, Wound culture from OR with MSSA and enterococcus fecalis   Pathology showed OM in margines so need 6 weeks treatment from the start   Was on Unasyn to complete 6 weeks on 7/26   Later grew Klebsiella and enterobacter and Stenotrophomonas not clear if colonization or real infection, wound is open for a while. Most likely needs wound vac.   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   Was on Unasyn to complete 6 weeks on 7/26.     PAST MEDICAL & SURGICAL HISTORY:  Diabetes mellitus II  HTN (hypertension)  h/o Anxiety attack  Depression  h/o Myocardial infarct 2007  CAD (coronary artery disease)  h/o Hepatitis A 1969  currently resolved, no symptoms  PAD (peripheral artery disease)  Murmur, cardiac  h/o Smoking  quitted 3/2012  CRF (chronic renal failure), unspecified stage  Dialysis patient  Anemia secondary to renal failure  HTN (hypertension)  coronary stent 2007  s/p Ovarian cyst removal  s/p surgical removal of benign Skin lesion epigastric area    Social Hx: no current smoking, ETOH or drugs     FAMILY HISTORY:  No pertinent family history in first degree relatives    Allergies  latex (Unknown)  No Known Drug Allergies    Antibiotics:  MEDICATIONS  (STANDING):  aspirin enteric coated 81 milliGRAM(s) Oral daily  atorvastatin 40 milliGRAM(s) Oral at bedtime  calcium acetate 667 milliGRAM(s) Oral three times a day with meals  cloNIDine 0.1 milliGRAM(s) Oral two times a day  heparin   Injectable 5000 Unit(s) SubCutaneous every 8 hours  imipramine 50 milliGRAM(s) Oral daily  lactobacillus acidophilus 1 Tablet(s) Oral two times a day  metoprolol tartrate 100 milliGRAM(s) Oral every 12 hours  Nephro-elvie 1 Tablet(s) Oral daily  pantoprazole    Tablet 40 milliGRAM(s) Oral before breakfast  piperacillin/tazobactam IVPB.. 3.375 Gram(s) IV Intermittent every 12 hours  risperiDONE   Tablet 0.5 milliGRAM(s) Oral two times a day  senna 2 Tablet(s) Oral at bedtime  zinc sulfate 220 milliGRAM(s) Oral daily     REVIEW OF SYSTEMS:  CONSTITUTIONAL:  No Fever or chills  HEENT:  No diplopia or blurred vision.  No sore throat or runny nose.  CARDIOVASCULAR:  No chest pain or SOB.  RESPIRATORY:  No cough, shortness of breath, PND or orthopnea.  GASTROINTESTINAL:  No nausea, vomiting or diarrhea.  GENITOURINARY:  No dysuria, frequency or urgency. No Blood in urine  MUSCULOSKELETAL:  no joint aches, no muscle pain  SKIN:  No change in skin, hair or nails.  NEUROLOGIC:  No paresthesias, fasciculations, seizures or weakness.  PSYCHIATRIC:  No disorder of thought or mood.  ENDOCRINE:  No heat or cold intolerance, polyuria or polydipsia.  HEMATOLOGICAL:  No easy bruising or bleeding.     Physical Exam:  Vital Signs Last 24 Hrs  T(C): 37.4 (14 Sep 2022 12:56), Max: 37.4 (13 Sep 2022 20:23)  T(F): 99.3 (14 Sep 2022 12:56), Max: 99.4 (13 Sep 2022 20:23)  HR: 60 (14 Sep 2022 12:56) (60 - 74)  BP: 115/69 (14 Sep 2022 12:56) (115/69 - 177/74)  BP(mean): --  RR: 19 (14 Sep 2022 12:56) (17 - 19)  SpO2: 94% (14 Sep 2022 12:56) (93% - 97%)  Parameters below as of 14 Sep 2022 12:56  Patient On (Oxygen Delivery Method): room air  GEN: NAD  HEENT: normocephalic and atraumatic. EOMI. PERRL.    NECK: Supple.  No lymphadenopathy   LUNGS: Clear to auscultation.  HEART: Regular rate and rhythm  ABDOMEN: Soft, nontender, and nondistended.  Positive bowel sounds.    : No CVA tenderness  EXTREMITIES: bypass wounds are healed, R 1st toe stump open with some discharge  NEUROLOGIC: grossly intact.  PSYCHIATRIC: Appropriate affect .  SKIN: No rash     Labs:                        10.1   15.48 )-----------( 215      ( 14 Sep 2022 08:20 )             30.8     09-14    132<L>  |  93<L>  |  67<H>  ----------------------------<  209<H>  5.8<H>   |  32<H>  |  6.40<H>    Ca    9.7      14 Sep 2022 14:00    TPro  5.9<L>  /  Alb  2.7<L>  /  TBili  0.4  /  DBili  x   /  AST  14<L>  /  ALT  15  /  AlkPhos  111  09-13    WBC Count: 15.48 K/uL (09-14-22 @ 08:20)  WBC Count: 10.03 K/uL (09-13-22 @ 07:28)  WBC Count: 10.67 K/uL (09-12-22 @ 09:24)    Creatinine, Serum: 6.40 mg/dL (09-14-22 @ 14:00)  Creatinine, Serum: 4.20 mg/dL (09-13-22 @ 07:28)  Creatinine, Serum: 7.52 mg/dL (09-12-22 @ 09:24)     COVID-19 PCR: NotDetec (09-12-22 @ 13:16)    All imaging and other data have been reviewed.  < from: Xray Foot AP + Lateral + Oblique, Right (07.14.22 @ 17:02) >  IMPRESSION: Soft tissue ulcer at the amputation site around the right   first metatarsal. Clear lungs at this time.    Assessment and Plan:   72 yo woman with PMH of HTN, DM2, ESRD on HD, Afib, CHF, CAD s/p CABG, PAD S/P R-->L bifem-fem/Pop bypass, recent R fem/pop bypass on 6/21/2022 and partial ray amputation right great toe 6/23/2022   was admitted from [facility] after a fall. She states that tried to go to bathroom by herself, had diarrhea and felt weak and slid to the floor.   6/21 s/p Femoral popliteal bypass with prosthetic graft  6/23 s/p R 1st toe ray amputation, Wound culture from OR with MSSA and enterococcus fecalis   Pathology showed OM in margines so need 6 weeks treatment from the start   Was on Unasyn to complete 6 weeks on 7/26   Later grew Klebsiella and enterobacter and Stenotrophomonas not clear if colonization or real infection, wound is open for a while. Most likely needs wound vac.   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   Was on Unasyn to complete 6 weeks on 7/26.     Tmax normal but WBC is 15k could be infectious or reactional.     Recommendations:  - Blood cultures x 2   - Monitor WBC and Tmax  - IF diarrhea recurs will send stool studies   - Will watch closely off ABx for now   -  Will start broad spectrum ABx if hemodynamically unstable   - Seen by podiatry recommending MRI or both feet     Will follow PRN.    Geovany Long MD  Division of Infectious Diseases   Please call ID service at [phone] with any question.      35 minutes spent on total encounter assessing patient, examination, chart reivew, counseling and coordinating care by the attending physician/nurse/care manager.

## 2022-09-14 NOTE — CONSULT NOTE ADULT - SUBJECTIVE AND OBJECTIVE BOX
Patient is a 71y old  Female who presents with a chief complaint of s/p fall (14 Sep 2022 14:48)      Reason For Consult: dm2 uncontrolled    HPI:  Patient was brought to [facility] ED from [facility] for fall today.  As per pt, pt had to go to bathroom, but wheel chair was unreachable, so pt ambulated to bathroom, had one diarrheal episode , got weaker, and slid to the floor. pt denies any pain in the ED.  No head trauma, headache or neck pain.  pt is dnr .Recent admission 07/14/2022  -71 year old female with PMH of Afib (not on AC for hx of multiple falls), T2DM, HTN, HLD, ESRD on HD (MWF), CHF, CAD s/p CABG, PAD S/P R-->L bifem-fem BK pop bypass, recent fempop bypass (6/21/2022), and partial ray amputation right great toe (6/22/2022) Pathology Final Diagnosis  06/23/22 1. Right hallux -Acute and chronic osteomyelitis. -Gangrenous skin and soft tissue. 2. Right first metatarsal/clean margin: -Minimal chronic osteomyelitis. Was on Unasyn to complete 6 weeks on 7/26 - Wound culture with Klebsiella, enterobacter, and also Stenotrophomonas but unclear if real infection .Followed  by ID at [facility]   - Vascular surgery and podiatry follow up requested since patient was supposed to be scheduled for followup within next 1-2 weeks    (12 Sep 2022 19:25)      PAST MEDICAL & SURGICAL HISTORY:  Diabetes mellitus II      HTN (hypertension)      h/o Anxiety attack      Depression      h/o Myocardial infarct 2007      CAD (coronary artery disease)      h/o Hepatitis A 1969  currently resolved, no symptoms      PAD (peripheral artery disease)      Murmur, cardiac      h/o Smoking  quitted 3/2012      CRF (chronic renal failure), unspecified stage      Dialysis patient      Anemia secondary to renal failure      HTN (hypertension)      Osteomyelitis      ESRD on dialysis      Falls      Ataxia      coronary stent 2007      s/p Ovarian cyst removal      s/p surgical removal of benign Skin lesion epigastric area      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          FAMILY HISTORY:        Social History:    MEDICATIONS  (STANDING):  aspirin enteric coated 81 milliGRAM(s) Oral daily  atorvastatin 40 milliGRAM(s) Oral at bedtime  calcium acetate 667 milliGRAM(s) Oral three times a day with meals  cloNIDine 0.1 milliGRAM(s) Oral two times a day  dextrose 5%. 1000 milliLiter(s) (50 mL/Hr) IV Continuous <Continuous>  dextrose 5%. 1000 milliLiter(s) (100 mL/Hr) IV Continuous <Continuous>  dextrose 50% Injectable 25 Gram(s) IV Push once  dextrose 50% Injectable 12.5 Gram(s) IV Push once  dextrose 50% Injectable 25 Gram(s) IV Push once  glucagon  Injectable 1 milliGRAM(s) IntraMuscular once  heparin   Injectable 5000 Unit(s) SubCutaneous every 12 hours  imipramine 50 milliGRAM(s) Oral daily  insulin lispro (ADMELOG) corrective regimen sliding scale   SubCutaneous three times a day before meals  lactobacillus acidophilus 1 Tablet(s) Oral three times a day  metoprolol tartrate 100 milliGRAM(s) Oral every 12 hours  multivitamin 1 Tablet(s) Oral daily  nystatin Powder 1 Application(s) Topical two times a day  pantoprazole    Tablet 40 milliGRAM(s) Oral before breakfast  risperiDONE   Tablet 0.5 milliGRAM(s) Oral two times a day  senna 2 Tablet(s) Oral at bedtime  zinc sulfate 220 milliGRAM(s) Oral daily    MEDICATIONS  (PRN):  acetaminophen     Tablet .. 650 milliGRAM(s) Oral every 6 hours PRN Temp greater or equal to 38C (100.4F), Mild Pain (1 - 3)  aluminum hydroxide/magnesium hydroxide/simethicone Suspension 30 milliLiter(s) Oral every 4 hours PRN Dyspepsia  bisacodyl Suppository 10 milliGRAM(s) Rectal daily PRN Constipation  dextrose Oral Gel 15 Gram(s) Oral once PRN Blood Glucose LESS THAN 70 milliGRAM(s)/deciliter  melatonin 3 milliGRAM(s) Oral at bedtime PRN Insomnia  ondansetron Injectable 4 milliGRAM(s) IV Push every 8 hours PRN Nausea and/or Vomiting  traMADol 50 milliGRAM(s) Oral three times a day PRN Moderate Pain (4 - 6)        T(C): 36.9 (09-14-22 @ 16:10), Max: 37.4 (09-13-22 @ 20:23)  HR: 70 (09-14-22 @ 16:10) (60 - 74)  BP: 121/41 (09-14-22 @ 16:10) (115/69 - 151/66)  RR: 17 (09-14-22 @ 16:10) (16 - 19)  SpO2: 97% (09-14-22 @ 16:10) (93% - 97%)  Wt(kg): --    PHYSICAL EXAM:  GENERAL: NAD, well-groomed, well-developed  HEAD:  Atraumatic, Normocephalic  NECK: Supple, No JVD, Normal thyroid  CHEST/LUNG: Clear to percussion bilaterally; No rales, rhonchi, wheezing, or rubs  HEART: Regular rate and rhythm; No murmurs, rubs, or gallops  ABDOMEN: Soft, Nontender, Nondistended; Bowel sounds present  EXTREMITIES:  2+ Peripheral Pulses, No clubbing, cyanosis, or edema  SKIN: No rashes or lesions    CAPILLARY BLOOD GLUCOSE      POCT Blood Glucose.: 108 mg/dL (14 Sep 2022 17:45)  POCT Blood Glucose.: 230 mg/dL (14 Sep 2022 12:05)  POCT Blood Glucose.: 245 mg/dL (14 Sep 2022 08:12)  POCT Blood Glucose.: 279 mg/dL (13 Sep 2022 21:36)                            10.1   15.48 )-----------( 215      ( 14 Sep 2022 08:20 )             30.8       CMP:  09-14 @ 14:00  SGPT --  Albumin --   Alk Phos --   Anion Gap 7   SGOT --   Total Bili --   BUN 67   Calcium Total 9.7   CO2 32   Chloride 93   Creatinine 6.40   eGFR if AA --   eGFR if non AA --   Glucose 209   Potassium 5.8   Protein --   Sodium 132      Thyroid Function Tests:      Diabetes Tests:       Radiology:

## 2022-09-15 LAB
ANION GAP SERPL CALC-SCNC: 4 MMOL/L — LOW (ref 5–17)
BUN SERPL-MCNC: 29 MG/DL — HIGH (ref 7–23)
CALCIUM SERPL-MCNC: 9.1 MG/DL — SIGNIFICANT CHANGE UP (ref 8.5–10.1)
CHLORIDE SERPL-SCNC: 99 MMOL/L — SIGNIFICANT CHANGE UP (ref 96–108)
CO2 SERPL-SCNC: 32 MMOL/L — HIGH (ref 22–31)
CREAT SERPL-MCNC: 3.7 MG/DL — HIGH (ref 0.5–1.3)
EGFR: 12 ML/MIN/1.73M2 — LOW
GLUCOSE SERPL-MCNC: 141 MG/DL — HIGH (ref 70–99)
HBV CORE AB SER-ACNC: REACTIVE
HCT VFR BLD CALC: 28.5 % — LOW (ref 34.5–45)
HGB BLD-MCNC: 9.1 G/DL — LOW (ref 11.5–15.5)
MCHC RBC-ENTMCNC: 30 PG — SIGNIFICANT CHANGE UP (ref 27–34)
MCHC RBC-ENTMCNC: 31.9 GM/DL — LOW (ref 32–36)
MCV RBC AUTO: 94.1 FL — SIGNIFICANT CHANGE UP (ref 80–100)
NRBC # BLD: 0 /100 WBCS — SIGNIFICANT CHANGE UP (ref 0–0)
PLATELET # BLD AUTO: 218 K/UL — SIGNIFICANT CHANGE UP (ref 150–400)
POTASSIUM SERPL-MCNC: 4.5 MMOL/L — SIGNIFICANT CHANGE UP (ref 3.5–5.3)
POTASSIUM SERPL-SCNC: 4.5 MMOL/L — SIGNIFICANT CHANGE UP (ref 3.5–5.3)
RBC # BLD: 3.03 M/UL — LOW (ref 3.8–5.2)
RBC # FLD: 16.8 % — HIGH (ref 10.3–14.5)
SODIUM SERPL-SCNC: 135 MMOL/L — SIGNIFICANT CHANGE UP (ref 135–145)
WBC # BLD: 8.93 K/UL — SIGNIFICANT CHANGE UP (ref 3.8–10.5)
WBC # FLD AUTO: 8.93 K/UL — SIGNIFICANT CHANGE UP (ref 3.8–10.5)

## 2022-09-15 PROCEDURE — 73721 MRI JNT OF LWR EXTRE W/O DYE: CPT | Mod: 26,LT

## 2022-09-15 PROCEDURE — 73718 MRI LOWER EXTREMITY W/O DYE: CPT | Mod: 26,RT

## 2022-09-15 PROCEDURE — 99232 SBSQ HOSP IP/OBS MODERATE 35: CPT | Mod: 24

## 2022-09-15 PROCEDURE — 99233 SBSQ HOSP IP/OBS HIGH 50: CPT

## 2022-09-15 RX ADMIN — HEPARIN SODIUM 5000 UNIT(S): 5000 INJECTION INTRAVENOUS; SUBCUTANEOUS at 17:20

## 2022-09-15 RX ADMIN — Medication 0.1 MILLIGRAM(S): at 17:18

## 2022-09-15 RX ADMIN — RISPERIDONE 0.5 MILLIGRAM(S): 4 TABLET ORAL at 17:38

## 2022-09-15 RX ADMIN — HEPARIN SODIUM 5000 UNIT(S): 5000 INJECTION INTRAVENOUS; SUBCUTANEOUS at 05:11

## 2022-09-15 RX ADMIN — Medication 100 MILLIGRAM(S): at 05:11

## 2022-09-15 RX ADMIN — Medication 81 MILLIGRAM(S): at 11:56

## 2022-09-15 RX ADMIN — ATORVASTATIN CALCIUM 40 MILLIGRAM(S): 80 TABLET, FILM COATED ORAL at 21:17

## 2022-09-15 RX ADMIN — PANTOPRAZOLE SODIUM 40 MILLIGRAM(S): 20 TABLET, DELAYED RELEASE ORAL at 05:11

## 2022-09-15 RX ADMIN — ZINC SULFATE TAB 220 MG (50 MG ZINC EQUIVALENT) 220 MILLIGRAM(S): 220 (50 ZN) TAB at 11:56

## 2022-09-15 RX ADMIN — Medication 1: at 11:56

## 2022-09-15 RX ADMIN — Medication 50 MILLIGRAM(S): at 11:56

## 2022-09-15 RX ADMIN — Medication 1: at 08:38

## 2022-09-15 RX ADMIN — Medication 1 TABLET(S): at 05:11

## 2022-09-15 RX ADMIN — Medication 667 MILLIGRAM(S): at 17:18

## 2022-09-15 RX ADMIN — Medication 667 MILLIGRAM(S): at 08:38

## 2022-09-15 RX ADMIN — SENNA PLUS 2 TABLET(S): 8.6 TABLET ORAL at 21:17

## 2022-09-15 RX ADMIN — RISPERIDONE 0.5 MILLIGRAM(S): 4 TABLET ORAL at 05:11

## 2022-09-15 RX ADMIN — Medication 667 MILLIGRAM(S): at 11:56

## 2022-09-15 RX ADMIN — INSULIN GLARGINE 8 UNIT(S): 100 INJECTION, SOLUTION SUBCUTANEOUS at 22:11

## 2022-09-15 RX ADMIN — NYSTATIN CREAM 1 APPLICATION(S): 100000 CREAM TOPICAL at 05:13

## 2022-09-15 RX ADMIN — NYSTATIN CREAM 1 APPLICATION(S): 100000 CREAM TOPICAL at 17:19

## 2022-09-15 RX ADMIN — Medication 0.1 MILLIGRAM(S): at 05:11

## 2022-09-15 RX ADMIN — Medication 100 MILLIGRAM(S): at 17:18

## 2022-09-15 RX ADMIN — Medication 1 TABLET(S): at 11:56

## 2022-09-15 RX ADMIN — Medication 1 TABLET(S): at 21:17

## 2022-09-15 NOTE — PROGRESS NOTE ADULT - SUBJECTIVE AND OBJECTIVE BOX
Patient is a 71y Female whom presented to the hospital with esrd on hd     PAST MEDICAL & SURGICAL HISTORY:  Diabetes mellitus II      HTN (hypertension)      h/o Anxiety attack      Depression      h/o Myocardial infarct 2007      CAD (coronary artery disease)      h/o Hepatitis A 1969  currently resolved, no symptoms      PAD (peripheral artery disease)      Murmur, cardiac      h/o Smoking  quitted 3/2012      CRF (chronic renal failure), unspecified stage      Dialysis patient      Anemia secondary to renal failure      HTN (hypertension)      Osteomyelitis      ESRD on dialysis      Falls      Ataxia      coronary stent 2007      s/p Ovarian cyst removal      s/p surgical removal of benign Skin lesion epigastric area      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          MEDICATIONS  (STANDING):  aspirin enteric coated 81 milliGRAM(s) Oral daily  atorvastatin 40 milliGRAM(s) Oral at bedtime  calcium acetate 667 milliGRAM(s) Oral three times a day with meals  cloNIDine 0.1 milliGRAM(s) Oral two times a day  dextrose 5%. 1000 milliLiter(s) (50 mL/Hr) IV Continuous <Continuous>  dextrose 5%. 1000 milliLiter(s) (100 mL/Hr) IV Continuous <Continuous>  dextrose 50% Injectable 25 Gram(s) IV Push once  dextrose 50% Injectable 12.5 Gram(s) IV Push once  dextrose 50% Injectable 25 Gram(s) IV Push once  glucagon  Injectable 1 milliGRAM(s) IntraMuscular once  heparin  Injectable (Preservative-Free) 5000 Unit(s) SubCutaneous two times a day  imipramine 50 milliGRAM(s) Oral daily  insulin lispro (ADMELOG) corrective regimen sliding scale   SubCutaneous three times a day before meals  lactobacillus acidophilus 1 Tablet(s) Oral three times a day  metoprolol tartrate 100 milliGRAM(s) Oral every 12 hours  multivitamin 1 Tablet(s) Oral daily  pantoprazole    Tablet 40 milliGRAM(s) Oral before breakfast  risperiDONE   Tablet 0.5 milliGRAM(s) Oral two times a day  senna 2 Tablet(s) Oral at bedtime  zinc sulfate 220 milliGRAM(s) Oral daily      Allergies    latex (Unknown)  No Known Drug Allergies    Intolerances        SOCIAL HISTORY:  Denies ETOh,Smoking,     FAMILY HISTORY:      REVIEW OF SYSTEMS:    CONSTITUTIONAL: No weakness, fevers or chills  RESPIRATORY: No cough, wheezing, hemoptysis; No shortness of breath  CARDIOVASCULAR: No chest pain or palpitations  GASTROINTESTINAL: No abdominal or epigastric pain. No nausea, vomiting,     No diarrhea or constipation. No melena   SKIN: dry                                       9.1    8.93  )-----------( 218      ( 15 Sep 2022 07:40 )             28.5       CBC Full  -  ( 15 Sep 2022 07:40 )  WBC Count : 8.93 K/uL  RBC Count : 3.03 M/uL  Hemoglobin : 9.1 g/dL  Hematocrit : 28.5 %  Platelet Count - Automated : 218 K/uL  Mean Cell Volume : 94.1 fl  Mean Cell Hemoglobin : 30.0 pg  Mean Cell Hemoglobin Concentration : 31.9 gm/dL  Auto Neutrophil # : x  Auto Lymphocyte # : x  Auto Monocyte # : x  Auto Eosinophil # : x  Auto Basophil # : x  Auto Neutrophil % : x  Auto Lymphocyte % : x  Auto Monocyte % : x  Auto Eosinophil % : x  Auto Basophil % : x      09-15    135  |  99  |  29<H>  ----------------------------<  141<H>  4.5   |  32<H>  |  3.70<H>    Ca    9.1      15 Sep 2022 07:40        CAPILLARY BLOOD GLUCOSE      POCT Blood Glucose.: 138 mg/dL (15 Sep 2022 16:57)  POCT Blood Glucose.: 173 mg/dL (15 Sep 2022 11:50)  POCT Blood Glucose.: 143 mg/dL (15 Sep 2022 08:05)  POCT Blood Glucose.: 170 mg/dL (14 Sep 2022 22:19)  POCT Blood Glucose.: 108 mg/dL (14 Sep 2022 17:45)      Vital Signs Last 24 Hrs  T(C): 36.7 (15 Sep 2022 13:01), Max: 36.8 (14 Sep 2022 20:27)  T(F): 98 (15 Sep 2022 13:01), Max: 98.3 (14 Sep 2022 20:27)  HR: 66 (15 Sep 2022 13:01) (60 - 75)  BP: 150/74 (15 Sep 2022 13:01) (136/61 - 150/74)  BP(mean): --  RR: 17 (15 Sep 2022 13:01) (17 - 18)  SpO2: 99% (15 Sep 2022 13:01) (92% - 99%)    Parameters below as of 15 Sep 2022 13:01  Patient On (Oxygen Delivery Method): room air                     PHYSICAL EXAM:    Constitutional: NAD  HEENT: conjunctive   clear   Neck:  No JVD  Respiratory: CTAB  Cardiovascular: S1 and S2  Gastrointestinal: BS+, soft, NT/ND  Extremities: No peripheral edema

## 2022-09-15 NOTE — PROGRESS NOTE ADULT - PROBLEM SELECTOR PLAN 1
Patient seen and evaluated   Dressed bilateral wounds with aquacel and DSD   Left ankle MRI: No OM, Right foot: +ve OM in  1 cm focus of osteomyelitis of the distal first metatarsal stump.  Discussed with patient for surgical intervention and bone biopsy, patient refused. Discussed the risks  and complications associated with refusal of surgical treatment and bone biopsy. Patient's recent MRI was positive  for OM . Patient only would prefer conservative treatment at this time. Patient also informed that there is still risk of  more severe infections, proximal amputations, loss of limb, sepsis and loss of life. Patient verbalized understanding  and will follow up in wound care clinic   Recommend PICC line IV antibiotics at this time  C/w IV abx per infectious disease      Wound Care Instructions:  -Keep dressing clean, dry, and intact to both foot  - Clen wounds aith normal saline. Milly it dry with gauze. Change the dressing with silver alginate to bilateral foot wound every other day  -Patient must remain Partial weight bearing as tolerated to the left heel in surgical shoe, full weight bearing to the right foot  -Monitor for any signs of infection including redness, swelling in the leg above the bandage, nausea/vomiting/fever/chills/chest pain/shortness of breath, if any are present patient must report to the emergency department immediately  -Patient is to follow up with  Dr. Duffy On Wednesday 9/22 after discharge at [facility] , please call before coming [phone] Patient seen and evaluated   Dressed bilateral wounds with aquacel and DSD   Left ankle MRI: No OM, Right foot: +ve OM in  1 cm focus of osteomyelitis of the distal first metatarsal stump.  Discussed with patient for surgical intervention and bone biopsy, patient refused. Discussed the risks  and complications associated with refusal of surgical treatment and bone biopsy. Patient's recent MRI was positive  for OM . Patient only would prefer conservative treatment at this time. Patient also informed that there is still risk of  more severe infections, proximal amputations, loss of limb, sepsis and loss of life. Patient verbalized understanding  and will follow up in wound care clinic   Recommend IV antibiotics per ID to treat the OM   C/w IV abx per infectious disease      Wound Care Instructions:  -Keep dressing clean, dry, and intact to both foot  - Clean wounds with normal saline. Milly it dry with gauze. Change the dressing with silver alginate to bilateral foot wound every other day  -Patient must remain Partial weight bearing as tolerated to the left heel in surgical shoe, full weight bearing to the right foot  -Monitor for any signs of infection including redness, swelling in the leg above the bandage, nausea/vomiting/fever/chills/chest pain/shortness of breath, if any are present patient must report to the emergency department immediately  -Patient is to follow up with  Dr. Duffy On Wednesday 9/22 after discharge at [facility] , please call before coming [phone]

## 2022-09-15 NOTE — PROGRESS NOTE ADULT - SUBJECTIVE AND OBJECTIVE BOX
71y year old Female seen at hospitals TELE 317 W1 for right foot surgical site wound s/p hallux and first metatarsal head resection (DOS 06/23/22) and left medial ankle wound. Patient was resting in bed comfortably without any distress. Patient was awake and denied any pain to the foot wounds.   Denies any fever, chills, nausea, vomiting, chest pain, shortness of breath, or calf pain at this time.    Allergies    Drug Allergies Not Recorded  latex (Hives)  latex (Unknown)  No Known Drug Allergies    Intolerances        MEDICATIONS  (STANDING):  aspirin enteric coated 81 milliGRAM(s) Oral daily  atorvastatin 40 milliGRAM(s) Oral at bedtime  calcium acetate 667 milliGRAM(s) Oral three times a day with meals  cloNIDine 0.1 milliGRAM(s) Oral two times a day  dextrose 5%. 1000 milliLiter(s) (50 mL/Hr) IV Continuous <Continuous>  dextrose 5%. 1000 milliLiter(s) (100 mL/Hr) IV Continuous <Continuous>  dextrose 50% Injectable 25 Gram(s) IV Push once  dextrose 50% Injectable 12.5 Gram(s) IV Push once  dextrose 50% Injectable 25 Gram(s) IV Push once  epoetin [id number]-epbx (RETACRIT) Injectable 69863 Unit(s) IV Push <User Schedule>  glucagon  Injectable 1 milliGRAM(s) IntraMuscular once  heparin   Injectable 5000 Unit(s) SubCutaneous every 12 hours  imipramine 50 milliGRAM(s) Oral daily  insulin glargine Injectable (LANTUS) 8 Unit(s) SubCutaneous at bedtime  insulin lispro (ADMELOG) corrective regimen sliding scale   SubCutaneous three times a day before meals  lactobacillus acidophilus 1 Tablet(s) Oral three times a day  metoprolol tartrate 100 milliGRAM(s) Oral every 12 hours  multivitamin 1 Tablet(s) Oral daily  nystatin Powder 1 Application(s) Topical two times a day  pantoprazole    Tablet 40 milliGRAM(s) Oral before breakfast  risperiDONE   Tablet 0.5 milliGRAM(s) Oral two times a day  senna 2 Tablet(s) Oral at bedtime  zinc sulfate 220 milliGRAM(s) Oral daily    MEDICATIONS  (PRN):  acetaminophen     Tablet .. 650 milliGRAM(s) Oral every 6 hours PRN Temp greater or equal to 38C (100.4F), Mild Pain (1 - 3)  aluminum hydroxide/magnesium hydroxide/simethicone Suspension 30 milliLiter(s) Oral every 4 hours PRN Dyspepsia  bisacodyl Suppository 10 milliGRAM(s) Rectal daily PRN Constipation  dextrose Oral Gel 15 Gram(s) Oral once PRN Blood Glucose LESS THAN 70 milliGRAM(s)/deciliter  melatonin 3 milliGRAM(s) Oral at bedtime PRN Insomnia  ondansetron Injectable 4 milliGRAM(s) IV Push every 8 hours PRN Nausea and/or Vomiting  traMADol 50 milliGRAM(s) Oral three times a day PRN Moderate Pain (4 - 6)      Vital Signs Last 24 Hrs  T(C): 36.8 (14 Sep 2022 20:27), Max: 37.4 (14 Sep 2022 12:56)  T(F): 98.3 (14 Sep 2022 20:27), Max: 99.3 (14 Sep 2022 12:56)  HR: 75 (14 Sep 2022 20:27) (60 - 75)  BP: 136/61 (14 Sep 2022 20:27) (115/69 - 150/68)  BP(mean): --  RR: 18 (14 Sep 2022 20:27) (16 - 19)  SpO2: 97% (14 Sep 2022 20:27) (93% - 98%)    Parameters below as of 14 Sep 2022 20:27  Patient On (Oxygen Delivery Method): room air    PHYSICAL EXAM:  Vascular: DP & PT Non palpable bilaterally, Capillary refill 3 seconds   Neurological: Light touch sensation diminished bilaterally  Musculoskeletal: 4/5 strength in all quadrants bilaterally, AJ & STJ ROM intact  Dermatological : Left foot: 2.0 x1.3x 0.9 cm ulceration noted to the medial left ankle, fibrotic wound bed, probe to bone, mild periwound erythema, mild fluctuance, no malodor, no purulence,  no proximal streaking at this time  Right wound: Open wound at the stump of the at the partial first resection  surgical site , 3.0 x1,3x 1.2 cm, fibronecrotic base, probe to bone, mild periwound erythema, no fluctuance, no malodor, no purulence   Right upper leg wound 1x1 cm granular base.                            10.1   15.48 )-----------( 215      ( 14 Sep 2022 08:20 )             30.8   09-14    132<L>  |  93<L>  |  67<H>  ----------------------------<  209<H>  5.8<H>   |  32<H>  |  6.40<H>    Ca    9.7      14 Sep 2022 14:00    TPro  5.9<L>  /  Alb  2.7<L>  /  TBili  0.4  /  DBili  x   /  AST  14<L>  /  ALT  15  /  AlkPhos  111  09-13      PT/INR - ( 13 Sep 2022 07:28 )   PT: 10.7 sec;   INR: 0.92 ratio                 Imaging: ----------    ACC: [id number] EXAM: XR FOOT COMP MIN 3 VIEWS RT  ACC: [id number] EXAM: XR CHEST AP OR PA 1V    PROCEDURE DATE: 07/14/2022        INTERPRETATION: Chest and right foot. Patient has a wound around the great toe.    AP semierect chest on July 14, 2022 at 4:52 PM.    Heart magnified by technique.    On June 30 there were mild lower lung field effusions.    Presently lungs are clear.    Right foot. 3 views.    Indication of the distal first metatarsal again noted.    There appears to be some soft tissue ulcer of the distal soft tissues increased from June 23. Arterial calcifications are noted.    IMPRESSION: Soft tissue ulcer at the amputation site around the right first metatarsal. Clear lungs at this time.    --- End of Report ---

## 2022-09-15 NOTE — PROGRESS NOTE ADULT - SUBJECTIVE AND OBJECTIVE BOX
[facility]  INFECTIOUS DISEASES   [address] [place] [address]  Tel: [phone]     Fax: [phone]  ======================================================  MD Zita Alejandra Kaushal, MD Cho, Michelle, MD   ======================================================    [id number]  SHILO KOHLER     CC: fall, had R foot wound     Awake and alert, no complaint, no fever,   No overnight event. Cultures so far negative.     PAST MEDICAL & SURGICAL HISTORY:  Diabetes mellitus II  HTN (hypertension)  h/o Anxiety attack  Depression  h/o Myocardial infarct 2007  CAD (coronary artery disease)  h/o Hepatitis A 1969  currently resolved, no symptoms  PAD (peripheral artery disease)  Murmur, cardiac  h/o Smoking  quitted 3/2012  CRF (chronic renal failure), unspecified stage  Dialysis patient  Anemia secondary to renal failure  HTN (hypertension)  coronary stent 2007  s/p Ovarian cyst removal  s/p surgical removal of benign Skin lesion epigastric area    Social Hx: no current smoking, ETOH or drugs     FAMILY HISTORY:  No pertinent family history in first degree relatives    Allergies  latex (Unknown)  No Known Drug Allergies    Antibiotics:  MEDICATIONS  (STANDING):  aspirin enteric coated 81 milliGRAM(s) Oral daily  atorvastatin 40 milliGRAM(s) Oral at bedtime  calcium acetate 667 milliGRAM(s) Oral three times a day with meals  cloNIDine 0.1 milliGRAM(s) Oral two times a day  heparin   Injectable 5000 Unit(s) SubCutaneous every 8 hours  imipramine 50 milliGRAM(s) Oral daily  lactobacillus acidophilus 1 Tablet(s) Oral two times a day  metoprolol tartrate 100 milliGRAM(s) Oral every 12 hours  Nephro-elvie 1 Tablet(s) Oral daily  pantoprazole    Tablet 40 milliGRAM(s) Oral before breakfast  piperacillin/tazobactam IVPB.. 3.375 Gram(s) IV Intermittent every 12 hours  risperiDONE   Tablet 0.5 milliGRAM(s) Oral two times a day  senna 2 Tablet(s) Oral at bedtime  zinc sulfate 220 milliGRAM(s) Oral daily     REVIEW OF SYSTEMS:  CONSTITUTIONAL:  No Fever or chills  HEENT:  No diplopia or blurred vision.  No sore throat or runny nose.  CARDIOVASCULAR:  No chest pain or SOB.  RESPIRATORY:  No cough, shortness of breath, PND or orthopnea.  GASTROINTESTINAL:  No nausea, vomiting or diarrhea.  GENITOURINARY:  No dysuria, frequency or urgency. No Blood in urine  MUSCULOSKELETAL:  no joint aches, no muscle pain  SKIN:  No change in skin, hair or nails.  NEUROLOGIC:  No paresthesias, fasciculations, seizures or weakness.  PSYCHIATRIC:  No disorder of thought or mood.  ENDOCRINE:  No heat or cold intolerance, polyuria or polydipsia.  HEMATOLOGICAL:  No easy bruising or bleeding.     Physical Exam:  Vital Signs Last 24 Hrs  T(C): 36.7 (15 Sep 2022 13:01), Max: 36.8 (14 Sep 2022 20:27)  T(F): 98 (15 Sep 2022 13:01), Max: 98.3 (14 Sep 2022 20:27)  HR: 66 (15 Sep 2022 13:01) (60 - 75)  BP: 150/74 (15 Sep 2022 13:01) (136/61 - 150/74)  BP(mean): --  RR: 17 (15 Sep 2022 13:01) (17 - 18)  SpO2: 99% (15 Sep 2022 13:01) (92% - 99%)  Parameters below as of 15 Sep 2022 13:01  Patient On (Oxygen Delivery Method): room air  GEN: NAD  HEENT: normocephalic and atraumatic. EOMI. PERRL.    NECK: Supple.  No lymphadenopathy   LUNGS: Clear to auscultation.  HEART: Regular rate and rhythm  ABDOMEN: Soft, nontender, and nondistended.  Positive bowel sounds.    : No CVA tenderness  EXTREMITIES: bypass wounds are healed, R 1st toe stump open with some discharge  NEUROLOGIC: grossly intact.  PSYCHIATRIC: Appropriate affect .  SKIN: No rash       Labs:                        9.1    8.93  )-----------( 218      ( 15 Sep 2022 07:40 )             28.5     09-15    135  |  99  |  29<H>  ----------------------------<  141<H>  4.5   |  32<H>  |  3.70<H>    Ca    9.1      15 Sep 2022 07:40    WBC Count: 8.93 K/uL (09-15-22 @ 07:40)  WBC Count: 15.48 K/uL (09-14-22 @ 08:20)  WBC Count: 10.03 K/uL (09-13-22 @ 07:28)  WBC Count: 10.67 K/uL (09-12-22 @ 09:24)    Creatinine, Serum: 3.70 mg/dL (09-15-22 @ 07:40)  Creatinine, Serum: 6.40 mg/dL (09-14-22 @ 14:00)  Creatinine, Serum: 4.20 mg/dL (09-13-22 @ 07:28)  Creatinine, Serum: 7.52 mg/dL (09-12-22 @ 09:24)    Sedimentation Rate, Erythrocyte: 41 mm/hr (09-14-22 @ 14:00)    COVID-19 PCR: NotDetec (09-12-22 @ 13:16)    All imaging and other data have been reviewed.  < from: Xray Foot AP + Lateral + Oblique, Right (07.14.22 @ 17:02) >  IMPRESSION: Soft tissue ulcer at the amputation site around the right   first metatarsal. Clear lungs at this time.    Assessment and Plan:   72 yo woman with PMH of HTN, DM2, ESRD on HD, Afib, CHF, CAD s/p CABG, PAD S/P R-->L bifem-fem/Pop bypass, recent R fem/pop bypass on 6/21/2022 and partial ray amputation right great toe 6/23/2022   was admitted from [facility] after a [facility]. She states that tried to go to bathroom by herself, had diarrhea and felt weak and slid to the floor.   6/21 s/p Femoral popliteal bypass with prosthetic graft  6/23 s/p R 1st toe ray amputation, Wound culture from OR with MSSA and enterococcus fecalis   Pathology showed OM in margines so need 6 weeks treatment from the start   Was on Unasyn to complete 6 weeks on 7/26   Later grew Klebsiella and enterobacter and Stenotrophomonas not clear if colonization or real infection, wound has improved healing and closing.   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   Was on Unasyn to complete 6 weeks on 7/26.     Tmax normal but WBC is 15k -->8.9 went back to normal, possibly reactional.     Recommendations:  - Blood cultures x 2 testing so far neg  - Monitor WBC and Tmax  - Will watch closely off ABx for now   - Will start broad spectrum ABx if hemodynamically unstable   - Seen by podiatry  - MRI of both feet no acute findings, OM responded to ABx     Will follow PRN.    Geovnay Long MD  Division of Infectious Diseases   Please call ID service at [phone] with any question.      35 minutes spent on total encounter assessing patient, examination, chart reivew, counseling and coordinating care by the attending physician/nurse/care manager.       [facility]  INFECTIOUS DISEASES   [address] [place] [address]  Tel: [phone]     Fax: [phone]  ======================================================  MD Zita Alejandra Kaushal, MD Cho, Michelle, MD   ======================================================    [id number]  SHILO KOHLER     CC: fall, had R foot wound     Awake and alert, no complaint, no fever,   No overnight event. Cultures so far negative.     PAST MEDICAL & SURGICAL HISTORY:  Diabetes mellitus II  HTN (hypertension)  h/o Anxiety attack  Depression  h/o Myocardial infarct 2007  CAD (coronary artery disease)  h/o Hepatitis A 1969  currently resolved, no symptoms  PAD (peripheral artery disease)  Murmur, cardiac  h/o Smoking  quitted 3/2012  CRF (chronic renal failure), unspecified stage  Dialysis patient  Anemia secondary to renal failure  HTN (hypertension)  coronary stent 2007  s/p Ovarian cyst removal  s/p surgical removal of benign Skin lesion epigastric area    Social Hx: no current smoking, ETOH or drugs     FAMILY HISTORY:  No pertinent family history in first degree relatives    Allergies  latex (Unknown)  No Known Drug Allergies    Antibiotics:  MEDICATIONS  (STANDING):  aspirin enteric coated 81 milliGRAM(s) Oral daily  atorvastatin 40 milliGRAM(s) Oral at bedtime  calcium acetate 667 milliGRAM(s) Oral three times a day with meals  cloNIDine 0.1 milliGRAM(s) Oral two times a day  heparin   Injectable 5000 Unit(s) SubCutaneous every 8 hours  imipramine 50 milliGRAM(s) Oral daily  lactobacillus acidophilus 1 Tablet(s) Oral two times a day  metoprolol tartrate 100 milliGRAM(s) Oral every 12 hours  Nephro-elvie 1 Tablet(s) Oral daily  pantoprazole    Tablet 40 milliGRAM(s) Oral before breakfast  piperacillin/tazobactam IVPB.. 3.375 Gram(s) IV Intermittent every 12 hours  risperiDONE   Tablet 0.5 milliGRAM(s) Oral two times a day  senna 2 Tablet(s) Oral at bedtime  zinc sulfate 220 milliGRAM(s) Oral daily     REVIEW OF SYSTEMS:  CONSTITUTIONAL:  No Fever or chills  HEENT:  No diplopia or blurred vision.  No sore throat or runny nose.  CARDIOVASCULAR:  No chest pain or SOB.  RESPIRATORY:  No cough, shortness of breath, PND or orthopnea.  GASTROINTESTINAL:  No nausea, vomiting or diarrhea.  GENITOURINARY:  No dysuria, frequency or urgency. No Blood in urine  MUSCULOSKELETAL:  no joint aches, no muscle pain  SKIN:  No change in skin, hair or nails.  NEUROLOGIC:  No paresthesias, fasciculations, seizures or weakness.  PSYCHIATRIC:  No disorder of thought or mood.  ENDOCRINE:  No heat or cold intolerance, polyuria or polydipsia.  HEMATOLOGICAL:  No easy bruising or bleeding.     Physical Exam:  Vital Signs Last 24 Hrs  T(C): 36.7 (15 Sep 2022 13:01), Max: 36.8 (14 Sep 2022 20:27)  T(F): 98 (15 Sep 2022 13:01), Max: 98.3 (14 Sep 2022 20:27)  HR: 66 (15 Sep 2022 13:01) (60 - 75)  BP: 150/74 (15 Sep 2022 13:01) (136/61 - 150/74)  BP(mean): --  RR: 17 (15 Sep 2022 13:01) (17 - 18)  SpO2: 99% (15 Sep 2022 13:01) (92% - 99%)  Parameters below as of 15 Sep 2022 13:01  Patient On (Oxygen Delivery Method): room air  GEN: NAD  HEENT: normocephalic and atraumatic. EOMI. PERRL.    NECK: Supple.  No lymphadenopathy   LUNGS: Clear to auscultation.  HEART: Regular rate and rhythm  ABDOMEN: Soft, nontender, and nondistended.  Positive bowel sounds.    : No CVA tenderness  EXTREMITIES: bypass wounds are healed, R 1st toe stump open with some discharge  NEUROLOGIC: grossly intact.  PSYCHIATRIC: Appropriate affect .  SKIN: No rash       Labs:                        9.1    8.93  )-----------( 218      ( 15 Sep 2022 07:40 )             28.5     09-15    135  |  99  |  29<H>  ----------------------------<  141<H>  4.5   |  32<H>  |  3.70<H>    Ca    9.1      15 Sep 2022 07:40    WBC Count: 8.93 K/uL (09-15-22 @ 07:40)  WBC Count: 15.48 K/uL (09-14-22 @ 08:20)  WBC Count: 10.03 K/uL (09-13-22 @ 07:28)  WBC Count: 10.67 K/uL (09-12-22 @ 09:24)    Creatinine, Serum: 3.70 mg/dL (09-15-22 @ 07:40)  Creatinine, Serum: 6.40 mg/dL (09-14-22 @ 14:00)  Creatinine, Serum: 4.20 mg/dL (09-13-22 @ 07:28)  Creatinine, Serum: 7.52 mg/dL (09-12-22 @ 09:24)    Sedimentation Rate, Erythrocyte: 41 mm/hr (09-14-22 @ 14:00)    COVID-19 PCR: NotDetec (09-12-22 @ 13:16)    All imaging and other data have been reviewed.  < from: Xray Foot AP + Lateral + Oblique, Right (07.14.22 @ 17:02) >  IMPRESSION: Soft tissue ulcer at the amputation site around the right   first metatarsal. Clear lungs at this time.    Assessment and Plan:   72 yo woman with PMH of HTN, DM2, ESRD on HD, Afib, CHF, CAD s/p CABG, PAD S/P R-->L bifem-fem/Pop bypass, recent R fem/pop bypass on 6/21/2022 and partial ray amputation right great toe 6/23/2022   was admitted from [facility] after a fall. She states that tried to go to bathroom by herself, had diarrhea and felt weak and slid to the floor.   6/21 s/p Femoral popliteal bypass with prosthetic graft  6/23 s/p R 1st toe ray amputation, Wound culture from OR with MSSA and enterococcus fecalis   Pathology showed OM in margines so need 6 weeks treatment from the start   Was on Unasyn to complete 6 weeks on 7/26   Later grew Klebsiella and enterobacter and Stenotrophomonas not clear if colonization or real infection, wound has improved healing and closing.   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   Was on Unasyn to complete 6 weeks on 7/26.     Tmax normal but WBC is 15k -->8.9 went back to normal, possibly reactional.     Recommendations:  - Blood cultures x 2 testing so far neg  - Monitor WBC and Tmax  - Will watch closely off ABx for now   - Will start broad spectrum ABx if hemodynamically unstable   - Seen by podiatry  - MRI of L foot no acute findings, OM responded to ABx, 1cm OM of R stump not clear if new or not    Will follow PRN.    Geovany Long MD  Division of Infectious Diseases   Please call ID service at [phone] with any question.      35 minutes spent on total encounter assessing patient, examination, chart reivew, counseling and coordinating care by the attending physician/nurse/care manager.

## 2022-09-15 NOTE — PROGRESS NOTE ADULT - SUBJECTIVE AND OBJECTIVE BOX
Patient is a 71y Female with a known history of :  Fall [W19.XXXA]    ESRD on dialysis [N18.6]    Acute on chronic osteomyelitis [M86.10]    PVD (peripheral vascular disease) [I73.9]    DM (diabetes mellitus) [E11.9]    Prophylactic measure [Z29.9]    Infected open wound [T14.8XXA]    Infected open wound [T14.8XXA]    Leucocytosis [D72.829]      HPI:  Patient was brought to [place] ED from [facility] for fall today.  As per pt, pt had to go to bathroom, but wheel chair was unreachable, so pt ambulated to bathroom, had one diarrheal episode , got weaker, and slid to the floor. pt denies any pain in the ED.  No head trauma, headache or neck pain.  pt is dnr .Recent admission 07/14/2022  -71 year old female with PMH of Afib (not on AC for hx of multiple falls), T2DM, HTN, HLD, ESRD on HD (MWF), CHF, CAD s/p CABG, PAD S/P R-->L bifem-fem BK pop bypass, recent fempop bypass (6/21/2022), and partial ray amputation right great toe (6/22/2022) Pathology Final Diagnosis  06/23/22 1. Right hallux -Acute and chronic osteomyelitis. -Gangrenous skin and soft tissue. 2. Right first metatarsal/clean margin: -Minimal chronic osteomyelitis. Was on Unasyn to complete 6 weeks on 7/26 - Wound culture with Klebsiella, enterobacter, and also Stenotrophomonas but unclear if real infection .Followed  by ID at [facility]   - Vascular surgery and podiatry follow up requested since patient was supposed to be scheduled for followup within next 1-2 weeks    (12 Sep 2022 19:25)      REVIEW OF SYSTEMS:    CONSTITUTIONAL: No fever, weight loss, or fatigue  EYES: No eye pain, visual disturbances, or discharge  ENMT:  No difficulty hearing, tinnitus, vertigo; No sinus or throat pain  NECK: No pain or stiffness  BREASTS: No pain, masses, or nipple discharge  RESPIRATORY: No cough, wheezing, chills or hemoptysis; No shortness of breath  CARDIOVASCULAR: No chest pain, palpitations, dizziness, or leg swelling  GASTROINTESTINAL: No abdominal or epigastric pain. No nausea, vomiting, or hematemesis; No diarrhea or constipation. No melena or hematochezia.  GENITOURINARY: No dysuria, frequency, hematuria, or incontinence  NEUROLOGICAL: No headaches, memory loss, loss of strength, numbness, or tremors  SKIN: No itching, burning, rashes, or lesions   LYMPH NODES: No enlarged glands  ENDOCRINE: No heat or cold intolerance; No hair loss  MUSCULOSKELETAL: No joint pain or swelling; No muscle, back, or extremity pain  PSYCHIATRIC: No depression, anxiety, mood swings, or difficulty sleeping  HEME/LYMPH: No easy bruising, or bleeding gums  ALLERGY AND IMMUNOLOGIC: No hives or eczema    MEDICATIONS  (STANDING):  aspirin enteric coated 81 milliGRAM(s) Oral daily  atorvastatin 40 milliGRAM(s) Oral at bedtime  calcium acetate 667 milliGRAM(s) Oral three times a day with meals  cloNIDine 0.1 milliGRAM(s) Oral two times a day  dextrose 5%. 1000 milliLiter(s) (50 mL/Hr) IV Continuous <Continuous>  dextrose 5%. 1000 milliLiter(s) (100 mL/Hr) IV Continuous <Continuous>  dextrose 50% Injectable 25 Gram(s) IV Push once  dextrose 50% Injectable 12.5 Gram(s) IV Push once  dextrose 50% Injectable 25 Gram(s) IV Push once  epoetin [id number]-epbx (RETACRIT) Injectable 73417 Unit(s) IV Push <User Schedule>  glucagon  Injectable 1 milliGRAM(s) IntraMuscular once  heparin   Injectable 5000 Unit(s) SubCutaneous every 12 hours  imipramine 50 milliGRAM(s) Oral daily  insulin glargine Injectable (LANTUS) 8 Unit(s) SubCutaneous at bedtime  insulin lispro (ADMELOG) corrective regimen sliding scale   SubCutaneous three times a day before meals  lactobacillus acidophilus 1 Tablet(s) Oral three times a day  metoprolol tartrate 100 milliGRAM(s) Oral every 12 hours  multivitamin 1 Tablet(s) Oral daily  nystatin Powder 1 Application(s) Topical two times a day  pantoprazole    Tablet 40 milliGRAM(s) Oral before breakfast  risperiDONE   Tablet 0.5 milliGRAM(s) Oral two times a day  senna 2 Tablet(s) Oral at bedtime  zinc sulfate 220 milliGRAM(s) Oral daily    MEDICATIONS  (PRN):  acetaminophen     Tablet .. 650 milliGRAM(s) Oral every 6 hours PRN Temp greater or equal to 38C (100.4F), Mild Pain (1 - 3)  aluminum hydroxide/magnesium hydroxide/simethicone Suspension 30 milliLiter(s) Oral every 4 hours PRN Dyspepsia  bisacodyl Suppository 10 milliGRAM(s) Rectal daily PRN Constipation  dextrose Oral Gel 15 Gram(s) Oral once PRN Blood Glucose LESS THAN 70 milliGRAM(s)/deciliter  melatonin 3 milliGRAM(s) Oral at bedtime PRN Insomnia  ondansetron Injectable 4 milliGRAM(s) IV Push every 8 hours PRN Nausea and/or Vomiting  traMADol 50 milliGRAM(s) Oral three times a day PRN Moderate Pain (4 - 6)      ALLERGIES: Drug Allergies Not Recorded  latex (Hives)  latex (Unknown)  No Known Drug Allergies      FAMILY HISTORY:      PHYSICAL EXAMINATION:  -----------------------------  T(C): 36.8 (09-15-22 @ 05:07), Max: 37.4 (09-14-22 @ 12:56)  HR: 60 (09-15-22 @ 05:07) (60 - 75)  BP: 147/68 (09-15-22 @ 05:07) (115/69 - 147/68)  RR: 18 (09-15-22 @ 05:07) (16 - 19)  SpO2: 92% (09-15-22 @ 05:07) (92% - 98%)  Wt(kg): --    09-14 @ 07:01  -  09-15 @ 07:00  --------------------------------------------------------  IN:  Total IN: 0 mL    OUT:    Other (mL): 2000 mL  Total OUT: 2000 mL    Total NET: -2000 mL            VITALS  T(C): 36.8 (09-15-22 @ 05:07), Max: 37.4 (09-14-22 @ 12:56)  HR: 60 (09-15-22 @ 05:07) (60 - 75)  BP: 147/68 (09-15-22 @ 05:07) (115/69 - 147/68)  RR: 18 (09-15-22 @ 05:07) (16 - 19)  SpO2: 92% (09-15-22 @ 05:07) (92% - 98%)    Constitutional: well developed, normal appearance, well groomed, well nourished, no deformities and no acute distress.   Eyes: the conjunctiva exhibited no abnormalities and the eyelids demonstrated no xanthelasmas.   HEENT: normal oral mucosa, no oral pallor and no oral cyanosis.   Neck: normal jugular venous A waves present, normal jugular venous V waves present and no jugular venous wilson A waves.   Pulmonary: no respiratory distress, normal respiratory rhythm and effort, no accessory muscle use and lungs were clear to auscultation bilaterally.   Cardiovascular: heart rate and rhythm were normal, normal S1 and S2 and no murmur, gallop, rub, heave or thrill are present.   Abdomen: soft, non-tender, no hepato-splenomegaly and no abdominal mass palpated.   Musculoskeletal: the gait could not be assessed..   Extremities: no clubbing of the fingernails, no localized cyanosis, no petechial hemorrhages and no ischemic changes.   Skin: normal skin color and pigmentation, no rash, no venous stasis, no skin lesions, no skin ulcer and no xanthoma was observed.   Psychiatric: oriented to person, place, and time, the affect was normal, the mood was normal and not feeling anxious.     LABS:   --------  09-14    132<L>  |  93<L>  |  67<H>  ----------------------------<  209<H>  5.8<H>   |  32<H>  |  6.40<H>    Ca    9.7      14 Sep 2022 14:00                           9.1    8.93  )-----------( 218      ( 15 Sep 2022 07:40 )             28.5                 RADIOLOGY:  -----------------    ECG:     ECHO:

## 2022-09-15 NOTE — PROGRESS NOTE ADULT - PROBLEM SELECTOR PLAN 1
1)left ankle wound   2)right post op open wound at the stump of the hallux  with possible OM  ·  seen by podiatry and ID   Recommend MRI b/l foot   IV abx -TBD   Vascular on board-no interventions   Possible surgical intervention, pending  MRI result. 1)left ankle wound -MRI 09/15/2022 OM resolved   2)right post op open wound at the stump of the hallux MRI 09/15/2022 Deep soft tissue wound at the site of prior transmetatarsal amputation of   the first ray, with approximately 1 cm focus of osteomyelitis of the   distal first metatarsal stump,   ·  seen by podiatry and ID    IV abx -TBD   Vascular on board-no interventions   Possible surgical intervention, pending  MRI result.

## 2022-09-15 NOTE — PROGRESS NOTE ADULT - SUBJECTIVE AND OBJECTIVE BOX
PROGRESS NOTE  Patient is a 71y old  Female who presents with a chief complaint of s/p fall (15 Sep 2022 07:53)    Chart and available morning labs /imaging are reviewed electronically , urgent issues addressed . More information  is being added upon completion of rounds , when more information is collected and management discussed with consultants , medical staff and social service/case management on the floor   OVERNIGHT      HPI:  Patient was brought to [facility] from [facility] for fall today.  As per pt, pt had to go to bathroom, but wheel chair was unreachable, so pt ambulated to bathroom, had one diarrheal episode , got weaker, and slid to the floor. pt denies any pain in the ED.  No head trauma, headache or neck pain.  pt is dnr .Recent admission 07/14/2022  -71 year old female with PMH of Afib (not on AC for hx of multiple falls), T2DM, HTN, HLD, ESRD on HD (MWF), CHF, CAD s/p CABG, PAD S/P R-->L bifem-fem BK pop bypass, recent fempop bypass (6/21/2022), and partial ray amputation right great toe (6/22/2022) Pathology Final Diagnosis  06/23/22 1. Right hallux -Acute and chronic osteomyelitis. -Gangrenous skin and soft tissue. 2. Right first metatarsal/clean margin: -Minimal chronic osteomyelitis. Was on Unasyn to complete 6 weeks on 7/26 - Wound culture with Klebsiella, enterobacter, and also Stenotrophomonas but unclear if real infection .Followed  by ID at [facility]   - Vascular surgery and podiatry follow up requested since patient was supposed to be scheduled for followup within next 1-2 weeks    (12 Sep 2022 19:25)    PAST MEDICAL & SURGICAL HISTORY:  Diabetes mellitus II      HTN (hypertension)      h/o Anxiety attack      Depression      h/o Myocardial infarct 2007      CAD (coronary artery disease)      h/o Hepatitis A 1969  currently resolved, no symptoms      PAD (peripheral artery disease)      Murmur, cardiac      h/o Smoking  quitted 3/2012      CRF (chronic renal failure), unspecified stage      Dialysis patient      Anemia secondary to renal failure      HTN (hypertension)      Osteomyelitis      ESRD on dialysis      Falls      Ataxia      coronary stent 2007      s/p Ovarian cyst removal      s/p surgical removal of benign Skin lesion epigastric area      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          MEDICATIONS  (STANDING):  aspirin enteric coated 81 milliGRAM(s) Oral daily  atorvastatin 40 milliGRAM(s) Oral at bedtime  calcium acetate 667 milliGRAM(s) Oral three times a day with meals  cloNIDine 0.1 milliGRAM(s) Oral two times a day  dextrose 5%. 1000 milliLiter(s) (50 mL/Hr) IV Continuous <Continuous>  dextrose 5%. 1000 milliLiter(s) (100 mL/Hr) IV Continuous <Continuous>  dextrose 50% Injectable 25 Gram(s) IV Push once  dextrose 50% Injectable 12.5 Gram(s) IV Push once  dextrose 50% Injectable 25 Gram(s) IV Push once  epoetin fawad-epbx (RETACRIT) Injectable 06042 Unit(s) IV Push <User Schedule>  glucagon  Injectable 1 milliGRAM(s) IntraMuscular once  heparin   Injectable 5000 Unit(s) SubCutaneous every 12 hours  imipramine 50 milliGRAM(s) Oral daily  insulin glargine Injectable (LANTUS) 8 Unit(s) SubCutaneous at bedtime  insulin lispro (ADMELOG) corrective regimen sliding scale   SubCutaneous three times a day before meals  lactobacillus acidophilus 1 Tablet(s) Oral three times a day  metoprolol tartrate 100 milliGRAM(s) Oral every 12 hours  multivitamin 1 Tablet(s) Oral daily  nystatin Powder 1 Application(s) Topical two times a day  pantoprazole    Tablet 40 milliGRAM(s) Oral before breakfast  risperiDONE   Tablet 0.5 milliGRAM(s) Oral two times a day  senna 2 Tablet(s) Oral at bedtime  zinc sulfate 220 milliGRAM(s) Oral daily    MEDICATIONS  (PRN):  acetaminophen     Tablet .. 650 milliGRAM(s) Oral every 6 hours PRN Temp greater or equal to 38C (100.4F), Mild Pain (1 - 3)  aluminum hydroxide/magnesium hydroxide/simethicone Suspension 30 milliLiter(s) Oral every 4 hours PRN Dyspepsia  bisacodyl Suppository 10 milliGRAM(s) Rectal daily PRN Constipation  dextrose Oral Gel 15 Gram(s) Oral once PRN Blood Glucose LESS THAN 70 milliGRAM(s)/deciliter  melatonin 3 milliGRAM(s) Oral at bedtime PRN Insomnia  ondansetron Injectable 4 milliGRAM(s) IV Push every 8 hours PRN Nausea and/or Vomiting  traMADol 50 milliGRAM(s) Oral three times a day PRN Moderate Pain (4 - 6)      OBJECTIVE    T(C): 36.8 (09-15-22 @ 05:07), Max: 37.4 (09-14-22 @ 12:56)  HR: 60 (09-15-22 @ 05:07) (60 - 75)  BP: 147/68 (09-15-22 @ 05:07) (115/69 - 147/68)  RR: 18 (09-15-22 @ 05:07) (16 - 19)  SpO2: 92% (09-15-22 @ 05:07) (92% - 98%)  Wt(kg): --  I&O's Summary    14 Sep 2022 07:01  -  15 Sep 2022 07:00  --------------------------------------------------------  IN: 0 mL / OUT: 2000 mL / NET: -2000 mL          REVIEW OF SYSTEMS:  CONSTITUTIONAL: No fever, weight loss, or fatigue  EYES: No eye pain, visual disturbances, or discharge  ENMT:   No sinus or throat pain  NECK: No pain or stiffness  RESPIRATORY: No cough, wheezing, chills or hemoptysis; No shortness of breath  CARDIOVASCULAR: No chest pain, palpitations, dizziness, or leg swelling  GASTROINTESTINAL: No abdominal pain. No nausea, vomiting; No diarrhea or constipation. No melena or hematochezia.  GENITOURINARY: No dysuria, frequency, hematuria, or incontinence  NEUROLOGICAL: No headaches, memory loss, loss of strength, numbness, or tremors  SKIN: No itching, burning, rashes, or lesions   MUSCULOSKELETAL: No joint pain or swelling; No muscle, back, or extremity pain    PHYSICAL EXAM:  Appearance: NAD. VS past 24 hrs -as above   HEENT:   Moist oral mucosa. Conjunctiva clear b/l.   Neck : supple  Respiratory: Lungs CTAB.  Gastrointestinal:  Soft, nontender. No rebound. No rigidity. BS present	  Cardiovascular: RRR ,S1S2 present  Neurologic: Non-focal. Moving all extremities.  Extremities: No edema. No erythema. No calf tenderness.  Skin: No rashes, No ecchymoses, No cyanosis.	  wounds ,skin lesions-See skin assesment flow sheet   LABS:                        9.1    8.93  )-----------( 218      ( 15 Sep 2022 07:40 )             28.5     09-15    135  |  99  |  29<H>  ----------------------------<  141<H>  4.5   |  32<H>  |  3.70<H>    Ca    9.1      15 Sep 2022 07:40      CAPILLARY BLOOD GLUCOSE      POCT Blood Glucose.: 143 mg/dL (15 Sep 2022 08:05)  POCT Blood Glucose.: 170 mg/dL (14 Sep 2022 22:19)  POCT Blood Glucose.: 108 mg/dL (14 Sep 2022 17:45)  POCT Blood Glucose.: 230 mg/dL (14 Sep 2022 12:05)          RADIOLOGY & ADDITIONAL TESTS:   reviewed elctronically  ASSESSMENT/PLAN: 	     PROGRESS NOTE  Patient is a 71y old  Female who presents with a chief complaint of s/p fall (15 Sep 2022 07:53)  Chart and available morning labs /imaging are reviewed electronically , urgent issues addressed . More information  is being added upon completion of rounds , when more information is collected and management discussed with consultants , medical staff and social service/case management on the floor   OVERNIGHT  No new issues reported by medical staff . All above noted Patient is resting in a bed comfortably .Confused ,poor mentation .No distress noted   HPI:  Patient was brought to [facility] ED from [facility] for fall today.  As per pt, pt had to go to bathroom, but wheel chair was unreachable, so pt ambulated to bathroom, had one diarrheal episode , got weaker, and slid to the floor. pt denies any pain in the ED.  No head trauma, headache or neck pain.  pt is dnr .Recent admission 07/14/2022  -71 year old female with PMH of Afib (not on AC for hx of multiple falls), T2DM, HTN, HLD, ESRD on HD (MWF), CHF, CAD s/p CABG, PAD S/P R-->L bifem-fem BK pop bypass, recent fempop bypass (6/21/2022), and partial ray amputation right great toe (6/22/2022) Pathology Final Diagnosis  06/23/22 1. Right hallux -Acute and chronic osteomyelitis. -Gangrenous skin and soft tissue. 2. Right first metatarsal/clean margin: -Minimal chronic osteomyelitis. Was on Unasyn to complete 6 weeks on 7/26 - Wound culture with Klebsiella, enterobacter, and also Stenotrophomonas but unclear if real infection .Followed  by ID at [facility]   - Vascular surgery and podiatry follow up requested since patient was supposed to be scheduled for followup within next 1-2 weeks    (12 Sep 2022 19:25)    PAST MEDICAL & SURGICAL HISTORY:  Diabetes mellitus II      HTN (hypertension)      h/o Anxiety attack      Depression      h/o Myocardial infarct 2007      CAD (coronary artery disease)      h/o Hepatitis A 1969  currently resolved, no symptoms      PAD (peripheral artery disease)      Murmur, cardiac      h/o Smoking  quitted 3/2012      CRF (chronic renal failure), unspecified stage      Dialysis patient      Anemia secondary to renal failure      HTN (hypertension)      Osteomyelitis      ESRD on dialysis      Falls      Ataxia      coronary stent 2007      s/p Ovarian cyst removal      s/p surgical removal of benign Skin lesion epigastric area      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          MEDICATIONS  (STANDING):  aspirin enteric coated 81 milliGRAM(s) Oral daily  atorvastatin 40 milliGRAM(s) Oral at bedtime  calcium acetate 667 milliGRAM(s) Oral three times a day with meals  cloNIDine 0.1 milliGRAM(s) Oral two times a day  dextrose 5%. 1000 milliLiter(s) (50 mL/Hr) IV Continuous <Continuous>  dextrose 5%. 1000 milliLiter(s) (100 mL/Hr) IV Continuous <Continuous>  dextrose 50% Injectable 25 Gram(s) IV Push once  dextrose 50% Injectable 12.5 Gram(s) IV Push once  dextrose 50% Injectable 25 Gram(s) IV Push once  epoetin fawad-epbx (RETACRIT) Injectable 89996 Unit(s) IV Push <User Schedule>  glucagon  Injectable 1 milliGRAM(s) IntraMuscular once  heparin   Injectable 5000 Unit(s) SubCutaneous every 12 hours  imipramine 50 milliGRAM(s) Oral daily  insulin glargine Injectable (LANTUS) 8 Unit(s) SubCutaneous at bedtime  insulin lispro (ADMELOG) corrective regimen sliding scale   SubCutaneous three times a day before meals  lactobacillus acidophilus 1 Tablet(s) Oral three times a day  metoprolol tartrate 100 milliGRAM(s) Oral every 12 hours  multivitamin 1 Tablet(s) Oral daily  nystatin Powder 1 Application(s) Topical two times a day  pantoprazole    Tablet 40 milliGRAM(s) Oral before breakfast  risperiDONE   Tablet 0.5 milliGRAM(s) Oral two times a day  senna 2 Tablet(s) Oral at bedtime  zinc sulfate 220 milliGRAM(s) Oral daily    MEDICATIONS  (PRN):  acetaminophen     Tablet .. 650 milliGRAM(s) Oral every 6 hours PRN Temp greater or equal to 38C (100.4F), Mild Pain (1 - 3)  aluminum hydroxide/magnesium hydroxide/simethicone Suspension 30 milliLiter(s) Oral every 4 hours PRN Dyspepsia  bisacodyl Suppository 10 milliGRAM(s) Rectal daily PRN Constipation  dextrose Oral Gel 15 Gram(s) Oral once PRN Blood Glucose LESS THAN 70 milliGRAM(s)/deciliter  melatonin 3 milliGRAM(s) Oral at bedtime PRN Insomnia  ondansetron Injectable 4 milliGRAM(s) IV Push every 8 hours PRN Nausea and/or Vomiting  traMADol 50 milliGRAM(s) Oral three times a day PRN Moderate Pain (4 - 6)      OBJECTIVE    T(C): 36.8 (09-15-22 @ 05:07), Max: 37.4 (09-14-22 @ 12:56)  HR: 60 (09-15-22 @ 05:07) (60 - 75)  BP: 147/68 (09-15-22 @ 05:07) (115/69 - 147/68)  RR: 18 (09-15-22 @ 05:07) (16 - 19)  SpO2: 92% (09-15-22 @ 05:07) (92% - 98%)  Wt(kg): --  I&O's Summary    14 Sep 2022 07:01  -  15 Sep 2022 07:00  --------------------------------------------------------  IN: 0 mL / OUT: 2000 mL / NET: -2000 mL          REVIEW OF SYSTEMS:  CONSTITUTIONAL: No fever, weight loss, or fatigue  EYES: No eye pain, visual disturbances, or discharge  ENMT:   No sinus or throat pain  NECK: No pain or stiffness  RESPIRATORY: No cough, wheezing, chills or hemoptysis; No shortness of breath  CARDIOVASCULAR: No chest pain, palpitations, dizziness, or leg swelling  GASTROINTESTINAL: No abdominal pain. No nausea, vomiting; No diarrhea or constipation. No melena or hematochezia.  GENITOURINARY: No dysuria, frequency, hematuria, or incontinence  NEUROLOGICAL: No headaches, memory loss, loss of strength, numbness, or tremors  SKIN: No itching, burning, rashes, or lesions   MUSCULOSKELETAL: No joint pain or swelling; No muscle, back, or extremity pain    PHYSICAL EXAM:  Appearance: NAD. VS past 24 hrs -as above   HEENT:   Moist oral mucosa. Conjunctiva clear b/l.   Neck : supple  Respiratory: Lungs CTAB.  Gastrointestinal:  Soft, nontender. No rebound. No rigidity. BS present	  Cardiovascular: RRR ,S1S2 present  Neurologic: Non-focal. Moving all extremities.  Extremities: No edema. No erythema. No calf tenderness.  Skin: No rashes, No ecchymoses, No cyanosis.	  wounds ,skin lesions-See skin assesment flow sheet   LABS:                        9.1    8.93  )-----------( 218      ( 15 Sep 2022 07:40 )             28.5     09-15    135  |  99  |  29<H>  ----------------------------<  141<H>  4.5   |  32<H>  |  3.70<H>    Ca    9.1      15 Sep 2022 07:40      CAPILLARY BLOOD GLUCOSE      POCT Blood Glucose.: 143 mg/dL (15 Sep 2022 08:05)  POCT Blood Glucose.: 170 mg/dL (14 Sep 2022 22:19)  POCT Blood Glucose.: 108 mg/dL (14 Sep 2022 17:45)  POCT Blood Glucose.: 230 mg/dL (14 Sep 2022 12:05)          RADIOLOGY & ADDITIONAL TESTS:< from: MR Ankle No Cont, Left (09.15.22 @ 14:54) >  ACC: [id number] EXAM:  MR ANKLE LT                          PROCEDURE DATE:  09/15/2022          INTERPRETATION:  EXAMINATION: MR ANKLE LEFT    CLINICAL INDICATION:Left ankle nonhealing wound    COMPARISON: Left ankle radiographs dated 4/22/2022 and MRI dated 4/8/2022    TECHNIQUE: Multiplanar, multi-sequence MRI of the left ankle was   performed without intravenous contrast.    INTERPRETATION:    Localizer: No additional findings.    Joint space: There are no tibiotalar or subtalar joint effusions.    Bones and articular cartilage: Since the previous MRI, the medial   malleolar edema has resolved, consistent with resolution of previous   osteomyelitis. There is no new MRI evidence for osteomyelitis on the   current exam.    Tendons and bursae: There is mild Achilles insertional enthesopathy. The   flexor, extensor, peroneal, and Achilles tendons are intact.  There is no   abnormal bursal distension.    Ligaments: The tibiofibular, talofibular, calcaneofibular, deltoid, and   spring ligaments are intact.    Plantar fascia: There is mild thickening and intermediate T2   hyperintensity of the medial cord of the plantar fascia with mild   adjacent, findings consistent with planter fasciitis.There is a plantar   spur.    Other: There is skin ulceration overlying the medial malleolus with   subcutaneous edema and T1 confluent hypointensity and STIR   hyperintensity. There is mild edema within the intrinsic muscles of the   foot which may be seen in neuropathy.    IMPRESSION:  1. Skin thickening with localized soft tissue wound at the medial   malleolus.    Interval resolution of previous medial malleolar osteomyelitis compared   to the prior 8/20/2022 MRI.    No MRI evidence for osteomyelitis or abscess.    2. Chronic findings of planter fasciitis and mild Achilles insertional   enthesopathy.    < end of copied text >  < from: MR Ankle No Cont, Left (09.15.22 @ 14:54) >  ACC: [id number] EXAM:  MR ANKLE LT                          PROCEDURE DATE:  09/15/2022          INTERPRETATION:  EXAMINATION: MR ANKLE LEFT    CLINICAL INDICATION:Left ankle nonhealing wound    COMPARISON: Left ankle radiographs dated 4/22/2022 and MRI dated 4/8/2022    TECHNIQUE: Multiplanar, multi-sequence MRI of the left ankle was   performed without intravenous contrast.    INTERPRETATION:    Localizer: No additional findings.    Joint space: There are no tibiotalar or subtalar joint effusions.    Bones and articular cartilage: Since the previous MRI, the medial   malleolar edema has resolved, consistent with resolution of previous   osteomyelitis. There is no new MRI evidence for osteomyelitis on the   current exam.    Tendons and bursae: There is mild Achilles insertional enthesopathy. The   flexor, extensor, peroneal, and Achilles tendons are intact.  There is no   abnormal bursal distension.    Ligaments: The tibiofibular, talofibular, calcaneofibular, deltoid, and   spring ligaments are intact.    Plantar fascia: There is mild thickening and intermediate T2   hyperintensity of the medial cord of the plantar fascia with mild   adjacent, findings consistent with planter fasciitis.There is a plantar   spur.    Other: There is skin ulceration overlying the medial malleolus with   subcutaneous edema and T1 confluent hypointensity and STIR   hyperintensity. There is mild edema within the intrinsic muscles of the   foot which may be seen in neuropathy.    IMPRESSION:  1. Skin thickening with localized soft tissue wound at the medial   malleolus.    Interval resolution of previous medial malleolar osteomyelitis compared   to the prior 8/20/2022 MRI.    No MRI evidence for osteomyelitis or abscess.    2. Chronic findings of planter fasciitis and mild Achilles insertional   enthesopathy.    < end of copied text >  < from: MR Foot No Cont, Right (09.15.22 @ 14:34) >  ACC: [id number] EXAM:  MR FOOT RT                          PROCEDURE DATE:  09/15/2022          INTERPRETATION:  EXAMINATION: MR FOOT RIGHT    CLINICAL INDICATION:Non-healing surgical wound    COMPARISON: Right foot radiographs dated 7/14/2022 and 6/23/2022 and   additional priors including MRI of 4/8/2022    TECHNIQUE: Multiplanar, multi-sequence MRI of the right foot was   performed without intravenous contrast.    INTERPRETATION:    BONES: There has been prior amputation of the first ray at the level of   the distal metatarsal shaft. At the distalmost lateral aspect of the   amputation stump there is a 1 cm focus of confluent T1 hypointense/STIR   hyperintense signal abnormality consistent with osteomyelitis.    There is no acute fracture.There are scattered mild/moderate   interphalangeal joint degenerative changes. There are no erosive changes   in the midfoot.    SOFT TISSUES: At the first metatarsal amputation site, there is skin   thickening and a deep soft tissue wound which tracks to the skin with   thickened T2 hyperintense, enhancing periphery, with a central   hypointense tract. The wound measures approximately 2.4 cm deep,   extending to the level of bone. There is no drainable abscess.    There is no acute ligament or tendon injury. Lisfranc ligament is intact.    IMPRESSION:  Deep soft tissue wound at the site of prior transmetatarsal amputation of   the first ray, with approximately 1 cm focus of osteomyelitis of the   distal first metatarsal stump, as detailed above.    < end of copied text >     reviewed elctronically  ASSESSMENT/PLAN: 	  25 minutes aggregate time was spent on this visit, 50% visit time spent in care co-ordination with other attendings and counselling patient .I have discussed care plan with patient / HCP/family member ,who expressed understanding of problems treatment and their effect and side effects, alternatives in details. I have asked if they have any questions and concerns and appropriately addressed them to best of my ability.

## 2022-09-15 NOTE — PROGRESS NOTE ADULT - SUBJECTIVE AND OBJECTIVE BOX
71y year old Female seen at [facility] TELE 317 W1 for right foot surgical site wound s/p hallux and first metatarsal head resection (DOS 06/23/22) and left medial ankle wound. Patient was resting in bed comfortably without any distress. Patient was awake and denied any pain to the foot wounds. Denies any fever, chills, nausea, vomiting, chest pain, shortness of breath, or calf pain at this time.    Allergies    Drug Allergies Not Recorded  latex (Hives)  latex (Unknown)  No Known Drug Allergies    Intolerances        MEDICATIONS  (STANDING):  aspirin enteric coated 81 milliGRAM(s) Oral daily  atorvastatin 40 milliGRAM(s) Oral at bedtime  calcium acetate 667 milliGRAM(s) Oral three times a day with meals  cloNIDine 0.1 milliGRAM(s) Oral two times a day  dextrose 5%. 1000 milliLiter(s) (50 mL/Hr) IV Continuous <Continuous>  dextrose 5%. 1000 milliLiter(s) (100 mL/Hr) IV Continuous <Continuous>  dextrose 50% Injectable 25 Gram(s) IV Push once  dextrose 50% Injectable 12.5 Gram(s) IV Push once  dextrose 50% Injectable 25 Gram(s) IV Push once  epoetin fawad-epbx (RETACRIT) Injectable 26630 Unit(s) IV Push <User Schedule>  glucagon  Injectable 1 milliGRAM(s) IntraMuscular once  heparin   Injectable 5000 Unit(s) SubCutaneous every 12 hours  imipramine 50 milliGRAM(s) Oral daily  insulin glargine Injectable (LANTUS) 8 Unit(s) SubCutaneous at bedtime  insulin lispro (ADMELOG) corrective regimen sliding scale   SubCutaneous three times a day before meals  lactobacillus acidophilus 1 Tablet(s) Oral three times a day  metoprolol tartrate 100 milliGRAM(s) Oral every 12 hours  multivitamin 1 Tablet(s) Oral daily  nystatin Powder 1 Application(s) Topical two times a day  pantoprazole    Tablet 40 milliGRAM(s) Oral before breakfast  risperiDONE   Tablet 0.5 milliGRAM(s) Oral two times a day  senna 2 Tablet(s) Oral at bedtime  zinc sulfate 220 milliGRAM(s) Oral daily    MEDICATIONS  (PRN):  acetaminophen     Tablet .. 650 milliGRAM(s) Oral every 6 hours PRN Temp greater or equal to 38C (100.4F), Mild Pain (1 - 3)  aluminum hydroxide/magnesium hydroxide/simethicone Suspension 30 milliLiter(s) Oral every 4 hours PRN Dyspepsia  bisacodyl Suppository 10 milliGRAM(s) Rectal daily PRN Constipation  dextrose Oral Gel 15 Gram(s) Oral once PRN Blood Glucose LESS THAN 70 milliGRAM(s)/deciliter  melatonin 3 milliGRAM(s) Oral at bedtime PRN Insomnia  ondansetron Injectable 4 milliGRAM(s) IV Push every 8 hours PRN Nausea and/or Vomiting  traMADol 50 milliGRAM(s) Oral three times a day PRN Moderate Pain (4 - 6)      Vital Signs Last 24 Hrs  T(C): 36.7 (15 Sep 2022 13:01), Max: 36.8 (14 Sep 2022 20:27)  T(F): 98 (15 Sep 2022 13:01), Max: 98.3 (14 Sep 2022 20:27)  HR: 66 (15 Sep 2022 13:01) (60 - 75)  BP: 150/74 (15 Sep 2022 13:01) (136/61 - 150/74)  BP(mean): --  RR: 17 (15 Sep 2022 13:01) (17 - 18)  SpO2: 99% (15 Sep 2022 13:01) (92% - 99%)    Parameters below as of 15 Sep 2022 13:01  Patient On (Oxygen Delivery Method): room air            PHYSICAL EXAM:  Vascular: DP & PT Non palpable bilaterally, Capillary refill 3 seconds   Neurological: Light touch sensation diminished bilaterally  Musculoskeletal: 4/5 strength in all quadrants bilaterally, AJ & STJ ROM intact  Dermatological : Left foot: 2.0 x1.3x 0.9 cm ulceration noted to the medial left ankle, fibrotic wound bed, probe to bone, mild periwound erythema, mild fluctuance, no malodor, no purulence,  no proximal streaking at this time  Right wound: Open wound at the stump of the at the partial first resection  surgical site , 3.0 x1,3x 1.2 cm, fibronecrotic base, probe to bone, mild periwound erythema, no fluctuance, no malodor, no purulence   Right upper leg wound 1x1 cm granular base.                                        9.1    8.93  )-----------( 218      ( 15 Sep 2022 07:40 )             28.5   09-15    135  |  99  |  29<H>  ----------------------------<  141<H>  4.5   |  32<H>  |  3.70<H>    Ca    9.1      15 Sep 2022 07:40                    Imaging: ----------    ACC: [id number] EXAM: XR FOOT COMP MIN 3 VIEWS RT  ACC: [id number] EXAM: XR CHEST AP OR PA 1V    PROCEDURE DATE: 07/14/2022        INTERPRETATION: Chest and right foot. Patient has a wound around the great toe.    AP semierect chest on July 14, 2022 at 4:52 PM.    Heart magnified by technique.    On June 30 there were mild lower lung field effusions.    Presently lungs are clear.    Right foot. 3 views.    Indication of the distal first metatarsal again noted.    There appears to be some soft tissue ulcer of the distal soft tissues increased from June 23. Arterial calcifications are noted.    IMPRESSION: Soft tissue ulcer at the amputation site around the right first metatarsal. Clear lungs at this time.    --- End of Report ---    < from: MR Ankle No Cont, Left (09.15.22 @ 14:54) >  CC: [id number] EXAM:  MR ANKLE LT                          PROCEDURE DATE:  09/15/2022          INTERPRETATION:  EXAMINATION: MR ANKLE LEFT    CLINICAL INDICATION:Left ankle nonhealing wound    COMPARISON: Left ankle radiographs dated 4/22/2022 and MRI dated 4/8/2022    TECHNIQUE: Multiplanar, multi-sequence MRI of the left ankle was   performed without intravenous contrast.    INTERPRETATION:    Localizer: No additional findings.    Joint space: There are no tibiotalar or subtalar joint effusions.    Bones and articular cartilage: Since the previous MRI, the medial   malleolar edema has resolved, consistent with resolution of previous   osteomyelitis. There is no new MRI evidence for osteomyelitis on the   current exam.    Tendons and bursae: There is mild Achilles insertional enthesopathy. The   flexor, extensor, peroneal, and Achilles tendons are intact.  There is no   abnormal bursal distension.    Ligaments: The tibiofibular, talofibular, calcaneofibular, deltoid, and   spring ligaments are intact.    Plantar fascia: There is mild thickening and intermediate T2   hyperintensity of the medial cord of the plantar fascia with mild   adjacent, findings consistent with planter fasciitis.There is a plantar   spur.    Other: There is skin ulceration overlying the medial malleolus with   subcutaneous edema and T1 confluent hypointensity and STIR   hyperintensity. There is mild edema within the intrinsic muscles of the   foot which may be seen in neuropathy.    IMPRESSION:  1. Skin thickening with localized soft tissue wound at the medial     < end of copied text >  < from: MR Foot No Cont, Right (09.15.22 @ 14:34) >    ACC: [id number] EXAM:  MR FOOT RT                          PROCEDURE DATE:  09/15/2022          INTERPRETATION:  EXAMINATION: MR FOOT RIGHT    CLINICAL INDICATION:Non-healing surgical wound    COMPARISON: Right foot radiographs dated 7/14/2022 and 6/23/2022 and   additional priors including MRI of 4/8/2022    TECHNIQUE: Multiplanar, multi-sequence MRI of the right foot was   performed without intravenous contrast.    INTERPRETATION:    BONES: There has been prior amputation of the first ray at the level of   the distal metatarsal shaft. At the distalmost lateral aspect of the   amputation stump there is a 1 cm focus of confluent T1 hypointense/STIR   hyperintense signal abnormality consistent with osteomyelitis.    There is no acute fracture.There are scattered mild/moderate   interphalangeal joint degenerative changes. There are no erosive changes   in the midfoot.    SOFT TISSUES: At the first metatarsal amputation site, there is skin   thickening and a deep soft tissue wound which tracks to the skin with   thickened T2 hyperintense, enhancing periphery, with a central   hypointense tract. The wound measures approximately 2.4 cm deep,   extending to the level of bone. There is no drainable abscess.    There is no acute ligament or tendon injury. Lisfranc ligament is intact.    IMPRESSION:  Deep soft tissue wound at the site of prior transmetatarsal amputation of   the first ray, with approximately 1 cm focus of osteomyelitis of the   distal first metatarsal stump, as detailed above.    --- End of Report ---              < end of copied text >

## 2022-09-15 NOTE — PROGRESS NOTE ADULT - PROBLEM SELECTOR PLAN 1
Patient seen and evaluated   Dressed bilateral wounds with aquacel and DSD   Patient will require possible surgical intervention during this admission pending MRI   Will discuss with patient and family tomorrow   C/w IV abx per infectious disease Patient seen and evaluated   Dressed bilateral wounds with aquacel and DSD   Patient will require possible surgical intervention during this admission pending MRI   Will discuss with patient and family    C/w IV abx per infectious disease

## 2022-09-16 LAB
ALBUMIN SERPL ELPH-MCNC: 2.6 G/DL — LOW (ref 3.3–5)
ANION GAP SERPL CALC-SCNC: 8 MMOL/L — SIGNIFICANT CHANGE UP (ref 5–17)
BUN SERPL-MCNC: 62 MG/DL — HIGH (ref 7–23)
CALCIUM SERPL-MCNC: 9.2 MG/DL — SIGNIFICANT CHANGE UP (ref 8.5–10.1)
CHLORIDE SERPL-SCNC: 97 MMOL/L — SIGNIFICANT CHANGE UP (ref 96–108)
CO2 SERPL-SCNC: 30 MMOL/L — SIGNIFICANT CHANGE UP (ref 22–31)
CREAT SERPL-MCNC: 6 MG/DL — HIGH (ref 0.5–1.3)
EGFR: 7 ML/MIN/1.73M2 — LOW
GLUCOSE SERPL-MCNC: 238 MG/DL — HIGH (ref 70–99)
PHOSPHATE SERPL-MCNC: 4 MG/DL — SIGNIFICANT CHANGE UP (ref 2.5–4.5)
POTASSIUM SERPL-MCNC: 4.6 MMOL/L — SIGNIFICANT CHANGE UP (ref 3.5–5.3)
POTASSIUM SERPL-SCNC: 4.6 MMOL/L — SIGNIFICANT CHANGE UP (ref 3.5–5.3)
SODIUM SERPL-SCNC: 135 MMOL/L — SIGNIFICANT CHANGE UP (ref 135–145)

## 2022-09-16 PROCEDURE — 99233 SBSQ HOSP IP/OBS HIGH 50: CPT

## 2022-09-16 RX ORDER — INSULIN GLARGINE 100 [IU]/ML
12 INJECTION, SOLUTION SUBCUTANEOUS AT BEDTIME
Refills: 0 | Status: DISCONTINUED | OUTPATIENT
Start: 2022-09-16 | End: 2022-09-17

## 2022-09-16 RX ADMIN — SENNA PLUS 2 TABLET(S): 8.6 TABLET ORAL at 22:50

## 2022-09-16 RX ADMIN — Medication 667 MILLIGRAM(S): at 17:21

## 2022-09-16 RX ADMIN — Medication 667 MILLIGRAM(S): at 13:56

## 2022-09-16 RX ADMIN — ATORVASTATIN CALCIUM 40 MILLIGRAM(S): 80 TABLET, FILM COATED ORAL at 21:24

## 2022-09-16 RX ADMIN — INSULIN GLARGINE 12 UNIT(S): 100 INJECTION, SOLUTION SUBCUTANEOUS at 22:49

## 2022-09-16 RX ADMIN — HEPARIN SODIUM 5000 UNIT(S): 5000 INJECTION INTRAVENOUS; SUBCUTANEOUS at 05:04

## 2022-09-16 RX ADMIN — Medication 3: at 17:22

## 2022-09-16 RX ADMIN — Medication 1 TABLET(S): at 05:05

## 2022-09-16 RX ADMIN — RISPERIDONE 0.5 MILLIGRAM(S): 4 TABLET ORAL at 17:22

## 2022-09-16 RX ADMIN — ERYTHROPOIETIN 10000 UNIT(S): 10000 INJECTION, SOLUTION INTRAVENOUS; SUBCUTANEOUS at 11:07

## 2022-09-16 RX ADMIN — Medication 667 MILLIGRAM(S): at 08:31

## 2022-09-16 RX ADMIN — Medication 81 MILLIGRAM(S): at 13:56

## 2022-09-16 RX ADMIN — Medication 1 TABLET(S): at 21:24

## 2022-09-16 RX ADMIN — Medication 50 MILLIGRAM(S): at 13:54

## 2022-09-16 RX ADMIN — Medication 2: at 08:30

## 2022-09-16 RX ADMIN — HEPARIN SODIUM 5000 UNIT(S): 5000 INJECTION INTRAVENOUS; SUBCUTANEOUS at 17:22

## 2022-09-16 RX ADMIN — Medication 1 TABLET(S): at 13:55

## 2022-09-16 RX ADMIN — ZINC SULFATE TAB 220 MG (50 MG ZINC EQUIVALENT) 220 MILLIGRAM(S): 220 (50 ZN) TAB at 13:55

## 2022-09-16 RX ADMIN — Medication 0.1 MILLIGRAM(S): at 05:05

## 2022-09-16 RX ADMIN — PANTOPRAZOLE SODIUM 40 MILLIGRAM(S): 20 TABLET, DELAYED RELEASE ORAL at 05:05

## 2022-09-16 RX ADMIN — Medication 1 TABLET(S): at 13:54

## 2022-09-16 RX ADMIN — NYSTATIN CREAM 1 APPLICATION(S): 100000 CREAM TOPICAL at 17:29

## 2022-09-16 RX ADMIN — NYSTATIN CREAM 1 APPLICATION(S): 100000 CREAM TOPICAL at 07:19

## 2022-09-16 RX ADMIN — RISPERIDONE 0.5 MILLIGRAM(S): 4 TABLET ORAL at 05:04

## 2022-09-16 RX ADMIN — Medication 0.1 MILLIGRAM(S): at 17:20

## 2022-09-16 RX ADMIN — Medication 1: at 13:52

## 2022-09-16 RX ADMIN — Medication 100 MILLIGRAM(S): at 17:21

## 2022-09-16 RX ADMIN — Medication 100 MILLIGRAM(S): at 05:05

## 2022-09-16 NOTE — PROGRESS NOTE ADULT - SUBJECTIVE AND OBJECTIVE BOX
Patient is a 71y Female with a known history of :  Fall [W19.XXXA]    ESRD on dialysis [N18.6]    Acute on chronic osteomyelitis [M86.10]    PVD (peripheral vascular disease) [I73.9]    DM (diabetes mellitus) [E11.9]    Prophylactic measure [Z29.9]    Infected open wound [T14.8XXA]    Infected open wound [T14.8XXA]    Leucocytosis [D72.829]      HPI:  Patient was brought to [facility] ED from [facility] for fall today.  As per pt, pt had to go to bathroom, but wheel chair was unreachable, so pt ambulated to bathroom, had one diarrheal episode , got weaker, and slid to the floor. pt denies any pain in the ED.  No head trauma, headache or neck pain.  pt is dnr .Recent admission 07/14/2022  -71 year old female with PMH of Afib (not on AC for hx of multiple falls), T2DM, HTN, HLD, ESRD on HD (MWF), CHF, CAD s/p CABG, PAD S/P R-->L bifem-fem BK pop bypass, recent fempop bypass (6/21/2022), and partial ray amputation right great toe (6/22/2022) Pathology Final Diagnosis  06/23/22 1. Right hallux -Acute and chronic osteomyelitis. -Gangrenous skin and soft tissue. 2. Right first metatarsal/clean margin: -Minimal chronic osteomyelitis. Was on Unasyn to complete 6 weeks on 7/26 - Wound culture with Klebsiella, enterobacter, and also Stenotrophomonas but unclear if real infection .Followed  by ID at [facility]   - Vascular surgery and podiatry follow up requested since patient was supposed to be scheduled for followup within next 1-2 weeks    (12 Sep 2022 19:25)      REVIEW OF SYSTEMS:    CONSTITUTIONAL: No fever, weight loss, or fatigue  EYES: No eye pain, visual disturbances, or discharge  ENMT:  No difficulty hearing, tinnitus, vertigo; No sinus or throat pain  NECK: No pain or stiffness  BREASTS: No pain, masses, or nipple discharge  RESPIRATORY: No cough, wheezing, chills or hemoptysis; No shortness of breath  CARDIOVASCULAR: No chest pain, palpitations, dizziness, or leg swelling  GASTROINTESTINAL: No abdominal or epigastric pain. No nausea, vomiting, or hematemesis; No diarrhea or constipation. No melena or hematochezia.  GENITOURINARY: No dysuria, frequency, hematuria, or incontinence  NEUROLOGICAL: No headaches, memory loss, loss of strength, numbness, or tremors  SKIN: No itching, burning, rashes, or lesions   LYMPH NODES: No enlarged glands  ENDOCRINE: No heat or cold intolerance; No hair loss  MUSCULOSKELETAL: No joint pain or swelling; No muscle, back, or extremity pain  PSYCHIATRIC: No depression, anxiety, mood swings, or difficulty sleeping  HEME/LYMPH: No easy bruising, or bleeding gums  ALLERGY AND IMMUNOLOGIC: No hives or eczema    MEDICATIONS  (STANDING):  aspirin enteric coated 81 milliGRAM(s) Oral daily  atorvastatin 40 milliGRAM(s) Oral at bedtime  calcium acetate 667 milliGRAM(s) Oral three times a day with meals  cloNIDine 0.1 milliGRAM(s) Oral two times a day  dextrose 5%. 1000 milliLiter(s) (100 mL/Hr) IV Continuous <Continuous>  dextrose 5%. 1000 milliLiter(s) (50 mL/Hr) IV Continuous <Continuous>  dextrose 50% Injectable 25 Gram(s) IV Push once  dextrose 50% Injectable 12.5 Gram(s) IV Push once  dextrose 50% Injectable 25 Gram(s) IV Push once  epoetin fawad-epbx (RETACRIT) Injectable 02307 Unit(s) IV Push <User Schedule>  glucagon  Injectable 1 milliGRAM(s) IntraMuscular once  heparin   Injectable 5000 Unit(s) SubCutaneous every 12 hours  imipramine 50 milliGRAM(s) Oral daily  insulin glargine Injectable (LANTUS) 12 Unit(s) SubCutaneous at bedtime  insulin lispro (ADMELOG) corrective regimen sliding scale   SubCutaneous three times a day before meals  lactobacillus acidophilus 1 Tablet(s) Oral three times a day  metoprolol tartrate 100 milliGRAM(s) Oral every 12 hours  multivitamin 1 Tablet(s) Oral daily  nystatin Powder 1 Application(s) Topical two times a day  pantoprazole    Tablet 40 milliGRAM(s) Oral before breakfast  risperiDONE   Tablet 0.5 milliGRAM(s) Oral two times a day  senna 2 Tablet(s) Oral at bedtime  zinc sulfate 220 milliGRAM(s) Oral daily    MEDICATIONS  (PRN):  acetaminophen     Tablet .. 650 milliGRAM(s) Oral every 6 hours PRN Temp greater or equal to 38C (100.4F), Mild Pain (1 - 3)  aluminum hydroxide/magnesium hydroxide/simethicone Suspension 30 milliLiter(s) Oral every 4 hours PRN Dyspepsia  bisacodyl Suppository 10 milliGRAM(s) Rectal daily PRN Constipation  dextrose Oral Gel 15 Gram(s) Oral once PRN Blood Glucose LESS THAN 70 milliGRAM(s)/deciliter  melatonin 3 milliGRAM(s) Oral at bedtime PRN Insomnia  ondansetron Injectable 4 milliGRAM(s) IV Push every 8 hours PRN Nausea and/or Vomiting  traMADol 50 milliGRAM(s) Oral three times a day PRN Moderate Pain (4 - 6)      ALLERGIES: Drug Allergies Not Recorded  latex (Hives)  latex (Unknown)  No Known Drug Allergies      FAMILY HISTORY:      PHYSICAL EXAMINATION:  -----------------------------  T(C): 37.2 (09-16-22 @ 04:39), Max: 37.4 (09-15-22 @ 20:10)  HR: 69 (09-16-22 @ 05:02) (66 - 76)  BP: 148/60 (09-16-22 @ 05:02) (101/47 - 163/70)  RR: 18 (09-16-22 @ 04:39) (17 - 18)  SpO2: 96% (09-16-22 @ 04:39) (91% - 99%)  Wt(kg): --    09-15 @ 07:01  -  09-16 @ 07:00  --------------------------------------------------------  IN:    Oral Fluid: 540 mL  Total IN: 540 mL    OUT:  Total OUT: 0 mL    Total NET: 540 mL            VITALS  T(C): 37.2 (09-16-22 @ 04:39), Max: 37.4 (09-15-22 @ 20:10)  HR: 69 (09-16-22 @ 05:02) (66 - 76)  BP: 148/60 (09-16-22 @ 05:02) (101/47 - 163/70)  RR: 18 (09-16-22 @ 04:39) (17 - 18)  SpO2: 96% (09-16-22 @ 04:39) (91% - 99%)    Constitutional: well developed, normal appearance, well groomed, well nourished, no deformities and no acute distress.   Eyes: the conjunctiva exhibited no abnormalities and the eyelids demonstrated no xanthelasmas.   HEENT: normal oral mucosa, no oral pallor and no oral cyanosis.   Neck: normal jugular venous A waves present, normal jugular venous V waves present and no jugular venous wilson A waves.   Pulmonary: no respiratory distress, normal respiratory rhythm and effort, no accessory muscle use and lungs were clear to auscultation bilaterally.   Cardiovascular: heart rate and rhythm were normal, normal S1 and S2 and no murmur, gallop, rub, heave or thrill are present.   Abdomen: soft, non-tender, no hepato-splenomegaly and no abdominal mass palpated.   Musculoskeletal: the gait could not be assessed..   Extremities: no clubbing of the fingernails, no localized cyanosis, no petechial hemorrhages and no ischemic changes.   Skin: normal skin color and pigmentation, no rash, no venous stasis, no skin lesions, no skin ulcer and no xanthoma was observed.   Psychiatric: oriented to person, place, and time, the affect was normal, the mood was normal and not feeling anxious.     LABS:   --------  09-15    135  |  99  |  29<H>  ----------------------------<  141<H>  4.5   |  32<H>  |  3.70<H>    Ca    9.1      15 Sep 2022 07:40                           9.1    8.93  )-----------( 218      ( 15 Sep 2022 07:40 )             28.5             Culture Results:   No growth to date. (09-14 @ 20:58)  Culture Results:   No growth to date. (09-14 @ 20:50)      RADIOLOGY:  -----------------    ECG:     ECHO:

## 2022-09-16 NOTE — PROGRESS NOTE ADULT - PROBLEM SELECTOR PLAN 3
1)left ankle wound -MRI 09/15/2022 OM resolved   2)right post op open wound at the stump of the hallux MRI 09/15/2022 Deep soft tissue wound at the site of prior transmetatarsal amputation of   the first ray, with approximately 1 cm focus of osteomyelitis of the   distal first metatarsal stump,   ·  seen by podiatry and ID    IV abx -TBD   Vascular on board-no interventions   Possible surgical intervention, pending  MRI result.

## 2022-09-16 NOTE — PROGRESS NOTE ADULT - SUBJECTIVE AND OBJECTIVE BOX
PROGRESS NOTE  Patient is a 71y old  Female who presents with a chief complaint of s/p fall (16 Sep 2022 12:33)    Chart and available morning labs /imaging are reviewed electronically , urgent issues addressed . More information  is being added upon completion of rounds , when more information is collected and management discussed with consultants , medical staff and social service/case management on the floor   OVERNIGHT  No new issues reported by medical staff . All above noted Patient is resting in a bed comfortably .Confused ,poor mentation .No distress noted MRI reviewed and d/w ID cons -will follow recommendations Podiatry input is appreciated   PATIENT IS REFUSING ANY SURGICAL INTERVENTIONS AND INSISTS ON D/C TO [facility] ON ABX [place] ONLY   HPI:  Patient was brought to [facility] ED from [facility] for fall today.  As per pt, pt had to go to bathroom, but wheel chair was unreachable, so pt ambulated to bathroom, had one diarrheal episode , got weaker, and slid to the floor. pt denies any pain in the ED.  No head trauma, headache or neck pain.  pt is dnr .Recent admission 07/14/2022  -71 year old female with PMH of Afib (not on AC for hx of multiple falls), T2DM, HTN, HLD, ESRD on HD (MWF), CHF, CAD s/p CABG, PAD S/P R-->L bifem-fem BK pop bypass, recent fempop bypass (6/21/2022), and partial ray amputation right great toe (6/22/2022) Pathology Final Diagnosis  06/23/22 1. Right hallux -Acute and chronic osteomyelitis. -Gangrenous skin and soft tissue. 2. Right first metatarsal/clean margin: -Minimal chronic osteomyelitis. Was on Unasyn to complete 6 weeks on 7/26 - Wound culture with Klebsiella, enterobacter, and also Stenotrophomonas but unclear if real infection .Followed  by ID at [facility]   - Vascular surgery and podiatry follow up requested since patient was supposed to be scheduled for followup within next 1-2 weeks    (12 Sep 2022 19:25)    PAST MEDICAL & SURGICAL HISTORY:  Diabetes mellitus II      HTN (hypertension)      h/o Anxiety attack      Depression      h/o Myocardial infarct 2007      CAD (coronary artery disease)      h/o Hepatitis A 1969  currently resolved, no symptoms      PAD (peripheral artery disease)      Murmur, cardiac      h/o Smoking  quitted 3/2012      CRF (chronic renal failure), unspecified stage      Dialysis patient      Anemia secondary to renal failure      HTN (hypertension)      Osteomyelitis      ESRD on dialysis      Falls      Ataxia      coronary stent 2007      s/p Ovarian cyst removal      s/p surgical removal of benign Skin lesion epigastric area      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          MEDICATIONS  (STANDING):  aspirin enteric coated 81 milliGRAM(s) Oral daily  atorvastatin 40 milliGRAM(s) Oral at bedtime  calcium acetate 667 milliGRAM(s) Oral three times a day with meals  cloNIDine 0.1 milliGRAM(s) Oral two times a day  dextrose 5%. 1000 milliLiter(s) (50 mL/Hr) IV Continuous <Continuous>  dextrose 5%. 1000 milliLiter(s) (100 mL/Hr) IV Continuous <Continuous>  dextrose 50% Injectable 25 Gram(s) IV Push once  dextrose 50% Injectable 12.5 Gram(s) IV Push once  dextrose 50% Injectable 25 Gram(s) IV Push once  epoetin fawad-epbx (RETACRIT) Injectable 90050 Unit(s) IV Push <User Schedule>  glucagon  Injectable 1 milliGRAM(s) IntraMuscular once  heparin   Injectable 5000 Unit(s) SubCutaneous every 12 hours  imipramine 50 milliGRAM(s) Oral daily  insulin glargine Injectable (LANTUS) 12 Unit(s) SubCutaneous at bedtime  insulin lispro (ADMELOG) corrective regimen sliding scale   SubCutaneous three times a day before meals  lactobacillus acidophilus 1 Tablet(s) Oral three times a day  metoprolol tartrate 100 milliGRAM(s) Oral every 12 hours  multivitamin 1 Tablet(s) Oral daily  nystatin Powder 1 Application(s) Topical two times a day  pantoprazole    Tablet 40 milliGRAM(s) Oral before breakfast  risperiDONE   Tablet 0.5 milliGRAM(s) Oral two times a day  senna 2 Tablet(s) Oral at bedtime  zinc sulfate 220 milliGRAM(s) Oral daily    MEDICATIONS  (PRN):  acetaminophen     Tablet .. 650 milliGRAM(s) Oral every 6 hours PRN Temp greater or equal to 38C (100.4F), Mild Pain (1 - 3)  aluminum hydroxide/magnesium hydroxide/simethicone Suspension 30 milliLiter(s) Oral every 4 hours PRN Dyspepsia  bisacodyl Suppository 10 milliGRAM(s) Rectal daily PRN Constipation  dextrose Oral Gel 15 Gram(s) Oral once PRN Blood Glucose LESS THAN 70 milliGRAM(s)/deciliter  melatonin 3 milliGRAM(s) Oral at bedtime PRN Insomnia  ondansetron Injectable 4 milliGRAM(s) IV Push every 8 hours PRN Nausea and/or Vomiting  traMADol 50 milliGRAM(s) Oral three times a day PRN Moderate Pain (4 - 6)      OBJECTIVE    T(C): 36.6 (09-16-22 @ 13:35), Max: 37.4 (09-15-22 @ 20:10)  HR: 74 (09-16-22 @ 16:35) (64 - 76)  BP: 131/63 (09-16-22 @ 16:35) (101/47 - 153/66)  RR: 16 (09-16-22 @ 16:35) (16 - 18)  SpO2: 97% (09-16-22 @ 16:35) (91% - 100%)  Wt(kg): --  I&O's Summary    15 Sep 2022 07:01  -  16 Sep 2022 07:00  --------------------------------------------------------  IN: 540 mL / OUT: 0 mL / NET: 540 mL    16 Sep 2022 07:01  -  16 Sep 2022 17:35  --------------------------------------------------------  IN: 0 mL / OUT: 1500 mL / NET: -1500 mL          REVIEW OF SYSTEMS:  CONSTITUTIONAL: No fever, weight loss, or fatigue  EYES: No eye pain, visual disturbances, or discharge  ENMT:   No sinus or throat pain  NECK: No pain or stiffness  RESPIRATORY: No cough, wheezing, chills or hemoptysis; No shortness of breath  CARDIOVASCULAR: No chest pain, palpitations, dizziness, or leg swelling  GASTROINTESTINAL: No abdominal pain. No nausea, vomiting; No diarrhea or constipation. No melena or hematochezia.  GENITOURINARY: No dysuria, frequency, hematuria, or incontinence  NEUROLOGICAL: No headaches, memory loss, loss of strength, numbness, or tremors  SKIN: No itching, burning, rashes, or lesions   MUSCULOSKELETAL: No joint pain or swelling; No muscle, back, or extremity pain    PHYSICAL EXAM:  Appearance: NAD. VS past 24 hrs -as above   HEENT:   Moist oral mucosa. Conjunctiva clear b/l.   Neck : supple  Respiratory: Lungs CTAB.  Gastrointestinal:  Soft, nontender. No rebound. No rigidity. BS present	  Cardiovascular: RRR ,S1S2 present  Neurologic: Non-focal. Moving all extremities.  Extremities: No edema. No erythema. No calf tenderness.  Skin: No rashes, No ecchymoses, No cyanosis.	  wounds ,skin lesions-See skin assesment flow sheet   LABS:                        9.1    8.93  )-----------( 218      ( 15 Sep 2022 07:40 )             28.5     09-16    135  |  97  |  62<H>  ----------------------------<  238<H>  4.6   |  30  |  6.00<H>    Ca    9.2      16 Sep 2022 10:10  Phos  4.0     09-16    TPro  x   /  Alb  2.6<L>  /  TBili  x   /  DBili  x   /  AST  x   /  ALT  x   /  AlkPhos  x   09-16    CAPILLARY BLOOD GLUCOSE      POCT Blood Glucose.: 252 mg/dL (16 Sep 2022 16:50)  POCT Blood Glucose.: 163 mg/dL (16 Sep 2022 13:26)  POCT Blood Glucose.: 160 mg/dL (16 Sep 2022 12:19)  POCT Blood Glucose.: 226 mg/dL (16 Sep 2022 07:59)  POCT Blood Glucose.: 205 mg/dL (15 Sep 2022 21:57)          Culture - Blood (collected 14 Sep 2022 20:58)  Source: .Blood Blood  Preliminary Report (16 Sep 2022 01:02):    No growth to date.    Culture - Blood (collected 14 Sep 2022 20:50)  Source: .Blood Blood  Preliminary Report (16 Sep 2022 01:02):    No growth to date.      RADIOLOGY & ADDITIONAL TESTS:   reviewed elctronically  ASSESSMENT/PLAN: 	    25 minutes aggregate time was spent on this visit, 50% visit time spent in care co-ordination with other attendings and counselling patient .I have discussed care plan with patient / HCP/family member ,who expressed understanding of problems treatment and their effect and side effects, alternatives in details. I have asked if they have any questions and concerns and appropriately addressed them to best of my ability.

## 2022-09-16 NOTE — PROGRESS NOTE ADULT - SUBJECTIVE AND OBJECTIVE BOX
Patient is a 71y Female whom presented to the hospital with esrd on hd     PAST MEDICAL & SURGICAL HISTORY:  Diabetes mellitus II      HTN (hypertension)      h/o Anxiety attack      Depression      h/o Myocardial infarct 2007      CAD (coronary artery disease)      h/o Hepatitis A 1969  currently resolved, no symptoms      PAD (peripheral artery disease)      Murmur, cardiac      h/o Smoking  quitted 3/2012      CRF (chronic renal failure), unspecified stage      Dialysis patient      Anemia secondary to renal failure      HTN (hypertension)      Osteomyelitis      ESRD on dialysis      Falls      Ataxia      coronary stent 2007      s/p Ovarian cyst removal      s/p surgical removal of benign Skin lesion epigastric area      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          MEDICATIONS  (STANDING):  aspirin enteric coated 81 milliGRAM(s) Oral daily  atorvastatin 40 milliGRAM(s) Oral at bedtime  calcium acetate 667 milliGRAM(s) Oral three times a day with meals  cloNIDine 0.1 milliGRAM(s) Oral two times a day  dextrose 5%. 1000 milliLiter(s) (50 mL/Hr) IV Continuous <Continuous>  dextrose 5%. 1000 milliLiter(s) (100 mL/Hr) IV Continuous <Continuous>  dextrose 50% Injectable 25 Gram(s) IV Push once  dextrose 50% Injectable 12.5 Gram(s) IV Push once  dextrose 50% Injectable 25 Gram(s) IV Push once  glucagon  Injectable 1 milliGRAM(s) IntraMuscular once  heparin  Injectable (Preservative-Free) 5000 Unit(s) SubCutaneous two times a day  imipramine 50 milliGRAM(s) Oral daily  insulin lispro (ADMELOG) corrective regimen sliding scale   SubCutaneous three times a day before meals  lactobacillus acidophilus 1 Tablet(s) Oral three times a day  metoprolol tartrate 100 milliGRAM(s) Oral every 12 hours  multivitamin 1 Tablet(s) Oral daily  pantoprazole    Tablet 40 milliGRAM(s) Oral before breakfast  risperiDONE   Tablet 0.5 milliGRAM(s) Oral two times a day  senna 2 Tablet(s) Oral at bedtime  zinc sulfate 220 milliGRAM(s) Oral daily      Allergies    latex (Unknown)  No Known Drug Allergies    Intolerances        SOCIAL HISTORY:  Denies ETOh,Smoking,     FAMILY HISTORY:      REVIEW OF SYSTEMS:    CONSTITUTIONAL: No weakness, fevers or chills  RESPIRATORY: No cough, wheezing, hemoptysis; No shortness of breath  CARDIOVASCULAR: No chest pain or palpitations  GASTROINTESTINAL: No abdominal or epigastric pain. No nausea, vomiting,     No diarrhea or constipation. No melena   SKIN: dry                                           9.1    8.93  )-----------( 218      ( 15 Sep 2022 07:40 )             28.5       CBC Full  -  ( 15 Sep 2022 07:40 )  WBC Count : 8.93 K/uL  RBC Count : 3.03 M/uL  Hemoglobin : 9.1 g/dL  Hematocrit : 28.5 %  Platelet Count - Automated : 218 K/uL  Mean Cell Volume : 94.1 fl  Mean Cell Hemoglobin : 30.0 pg  Mean Cell Hemoglobin Concentration : 31.9 gm/dL  Auto Neutrophil # : x  Auto Lymphocyte # : x  Auto Monocyte # : x  Auto Eosinophil # : x  Auto Basophil # : x  Auto Neutrophil % : x  Auto Lymphocyte % : x  Auto Monocyte % : x  Auto Eosinophil % : x  Auto Basophil % : x      09-16    135  |  97  |  62<H>  ----------------------------<  238<H>  4.6   |  30  |  6.00<H>    Ca    9.2      16 Sep 2022 10:10  Phos  4.0     09-16    TPro  x   /  Alb  2.6<L>  /  TBili  x   /  DBili  x   /  AST  x   /  ALT  x   /  AlkPhos  x   09-16      CAPILLARY BLOOD GLUCOSE      POCT Blood Glucose.: 252 mg/dL (16 Sep 2022 16:50)  POCT Blood Glucose.: 163 mg/dL (16 Sep 2022 13:26)  POCT Blood Glucose.: 160 mg/dL (16 Sep 2022 12:19)  POCT Blood Glucose.: 226 mg/dL (16 Sep 2022 07:59)  POCT Blood Glucose.: 205 mg/dL (15 Sep 2022 21:57)      Vital Signs Last 24 Hrs  T(C): 36.6 (16 Sep 2022 13:35), Max: 37.4 (15 Sep 2022 20:10)  T(F): 97.8 (16 Sep 2022 13:35), Max: 99.4 (15 Sep 2022 20:10)  HR: 74 (16 Sep 2022 16:35) (64 - 76)  BP: 131/63 (16 Sep 2022 16:35) (101/47 - 153/66)  BP(mean): --  RR: 16 (16 Sep 2022 16:35) (16 - 18)  SpO2: 97% (16 Sep 2022 16:35) (91% - 100%)    Parameters below as of 16 Sep 2022 16:35  Patient On (Oxygen Delivery Method): room air                       PHYSICAL EXAM:    Constitutional: NAD  HEENT: conjunctive   clear   Neck:  No JVD  Respiratory: CTAB  Cardiovascular: S1 and S2  Gastrointestinal: BS+, soft, NT/ND  Extremities: No peripheral edema

## 2022-09-16 NOTE — PROGRESS NOTE ADULT - PROBLEM SELECTOR PLAN 1
increase lantus 12 units qhs  cont admelog corrective scale coverage qac/qhs  cont cons cho diet  goal bg 100-180 in hosp setting

## 2022-09-16 NOTE — PROGRESS NOTE ADULT - SUBJECTIVE AND OBJECTIVE BOX
CAPILLARY BLOOD GLUCOSE      POCT Blood Glucose.: 226 mg/dL (16 Sep 2022 07:59)  POCT Blood Glucose.: 205 mg/dL (15 Sep 2022 21:57)  POCT Blood Glucose.: 138 mg/dL (15 Sep 2022 16:57)  POCT Blood Glucose.: 173 mg/dL (15 Sep 2022 11:50)  POCT Blood Glucose.: 143 mg/dL (15 Sep 2022 08:05)      Vital Signs Last 24 Hrs  T(C): 37.2 (16 Sep 2022 04:39), Max: 37.4 (15 Sep 2022 20:10)  T(F): 98.9 (16 Sep 2022 04:39), Max: 99.4 (15 Sep 2022 20:10)  HR: 69 (16 Sep 2022 05:02) (66 - 76)  BP: 148/60 (16 Sep 2022 05:02) (101/47 - 163/70)  BP(mean): --  RR: 18 (16 Sep 2022 04:39) (17 - 18)  SpO2: 96% (16 Sep 2022 04:39) (91% - 99%)    Parameters below as of 16 Sep 2022 04:39  Patient On (Oxygen Delivery Method): room air        General: WN/WD NAD  Respiratory: CTA B/L  CV: RRR, S1S2, no murmurs, rubs or gallops  Abdominal: Soft, NT, ND +BS, Last BM  Extremities: No edema, + peripheral pulses     09-15    135  |  99  |  29<H>  ----------------------------<  141<H>  4.5   |  32<H>  |  3.70<H>    Ca    9.1      15 Sep 2022 07:40        atorvastatin 40 milliGRAM(s) Oral at bedtime  dextrose 50% Injectable 25 Gram(s) IV Push once  dextrose 50% Injectable 12.5 Gram(s) IV Push once  dextrose 50% Injectable 25 Gram(s) IV Push once  dextrose Oral Gel 15 Gram(s) Oral once PRN  glucagon  Injectable 1 milliGRAM(s) IntraMuscular once  insulin glargine Injectable (LANTUS) 8 Unit(s) SubCutaneous at bedtime  insulin lispro (ADMELOG) corrective regimen sliding scale   SubCutaneous three times a day before meals

## 2022-09-16 NOTE — PROGRESS NOTE ADULT - SUBJECTIVE AND OBJECTIVE BOX
[facility]  INFECTIOUS DISEASES   [address] [place] [address]  Tel: [phone]     Fax: [phone]  ======================================================  MD Zita Alejandra Kaushal, MD Cho, Michelle, MD   ======================================================    [id number]  SHILO KOHLER     CC: fall, had R foot wound     Awake and alert, no complaint, no fever,   No overnight event. Cultures so far negative.   Refusing any surgery.     PAST MEDICAL & SURGICAL HISTORY:  Diabetes mellitus II  HTN (hypertension)  h/o Anxiety attack  Depression  h/o Myocardial infarct 2007  CAD (coronary artery disease)  h/o Hepatitis A 1969  currently resolved, no symptoms  PAD (peripheral artery disease)  Murmur, cardiac  h/o Smoking  quitted 3/2012  CRF (chronic renal failure), unspecified stage  Dialysis patient  Anemia secondary to renal failure  HTN (hypertension)  coronary stent 2007  s/p Ovarian cyst removal  s/p surgical removal of benign Skin lesion epigastric area    Social Hx: no current smoking, ETOH or drugs     FAMILY HISTORY:  No pertinent family history in first degree relatives    Allergies  latex (Unknown)  No Known Drug Allergies    Antibiotics:  MEDICATIONS  (STANDING):  aspirin enteric coated 81 milliGRAM(s) Oral daily  atorvastatin 40 milliGRAM(s) Oral at bedtime  calcium acetate 667 milliGRAM(s) Oral three times a day with meals  cloNIDine 0.1 milliGRAM(s) Oral two times a day  heparin   Injectable 5000 Unit(s) SubCutaneous every 8 hours  imipramine 50 milliGRAM(s) Oral daily  lactobacillus acidophilus 1 Tablet(s) Oral two times a day  metoprolol tartrate 100 milliGRAM(s) Oral every 12 hours  Nephro-elvie 1 Tablet(s) Oral daily  pantoprazole    Tablet 40 milliGRAM(s) Oral before breakfast  piperacillin/tazobactam IVPB.. 3.375 Gram(s) IV Intermittent every 12 hours  risperiDONE   Tablet 0.5 milliGRAM(s) Oral two times a day  senna 2 Tablet(s) Oral at bedtime  zinc sulfate 220 milliGRAM(s) Oral daily     REVIEW OF SYSTEMS:  CONSTITUTIONAL:  No Fever or chills  HEENT:  No diplopia or blurred vision.  No sore throat or runny nose.  CARDIOVASCULAR:  No chest pain or SOB.  RESPIRATORY:  No cough, shortness of breath, PND or orthopnea.  GASTROINTESTINAL:  No nausea, vomiting or diarrhea.  GENITOURINARY:  No dysuria, frequency or urgency. No Blood in urine  MUSCULOSKELETAL:  no joint aches, no muscle pain  SKIN:  No change in skin, hair or nails.  NEUROLOGIC:  No paresthesias, fasciculations, seizures or weakness.  PSYCHIATRIC:  No disorder of thought or mood.  ENDOCRINE:  No heat or cold intolerance, polyuria or polydipsia.  HEMATOLOGICAL:  No easy bruising or bleeding.     Physical Exam:  Vital Signs Last 24 Hrs  T(C): 36.6 (16 Sep 2022 10:05), Max: 37.4 (15 Sep 2022 20:10)  T(F): 97.9 (16 Sep 2022 10:05), Max: 99.4 (15 Sep 2022 20:10)  HR: 64 (16 Sep 2022 10:05) (64 - 76)  BP: 112/49 (16 Sep 2022 10:05) (101/47 - 163/70)  BP(mean): --  RR: 18 (16 Sep 2022 10:05) (17 - 18)  SpO2: 100% (16 Sep 2022 10:05) (91% - 100%)  Parameters below as of 16 Sep 2022 10:05  Patient On (Oxygen Delivery Method): room air  GEN: NAD  HEENT: normocephalic and atraumatic. EOMI. PERRL.    NECK: Supple.  No lymphadenopathy   LUNGS: Clear to auscultation.  HEART: Regular rate and rhythm  ABDOMEN: Soft, nontender, and nondistended.  Positive bowel sounds.    : No CVA tenderness  EXTREMITIES: bypass wounds are healed, R 1st toe stump open with some purulent malodorous discharge  R lateral heel with a small ulcer, crusted no discharge.   Left foot with an open ulcer on malleolus but no discharge or cellulitis.   NEUROLOGIC: grossly intact.  PSYCHIATRIC: Appropriate affect .  SKIN: No rash     Labs:                        9.1    8.93  )-----------( 218      ( 15 Sep 2022 07:40 )             28.5     09-16    135  |  97  |  62<H>  ----------------------------<  238<H>  4.6   |  30  |  6.00<H>    Ca    9.2      16 Sep 2022 10:10  Phos  4.0     09-16    TPro  x   /  Alb  2.6<L>  /  TBili  x   /  DBili  x   /  AST  x   /  ALT  x   /  AlkPhos  x   09-16    Culture - Blood (collected 09-14-22 @ 20:58)  Source: .Blood Blood    Culture - Blood (collected 09-14-22 @ 20:50)  Source: .Blood Blood    WBC Count: 8.93 K/uL (09-15-22 @ 07:40)  WBC Count: 15.48 K/uL (09-14-22 @ 08:20)  WBC Count: 10.03 K/uL (09-13-22 @ 07:28)  WBC Count: 10.67 K/uL (09-12-22 @ 09:24)    Creatinine, Serum: 6.00 mg/dL (09-16-22 @ 10:10)  Creatinine, Serum: 3.70 mg/dL (09-15-22 @ 07:40)  Creatinine, Serum: 6.40 mg/dL (09-14-22 @ 14:00)  Creatinine, Serum: 4.20 mg/dL (09-13-22 @ 07:28)  Creatinine, Serum: 7.52 mg/dL (09-12-22 @ 09:24)    Sedimentation Rate, Erythrocyte: 41 mm/hr (09-14-22 @ 14:00)     COVID-19 PCR: NotDetec (09-12-22 @ 13:16)    All imaging and other data have been reviewed.  < from: Xray Foot AP + Lateral + Oblique, Right (07.14.22 @ 17:02) >  IMPRESSION: Soft tissue ulcer at the amputation site around the right   first metatarsal. Clear lungs at this time.    Assessment and Plan:   72 yo woman with PMH of HTN, DM2, ESRD on HD, Afib, CHF, CAD s/p CABG, PAD S/P R-->L bifem-fem/Pop bypass, recent R fem/pop bypass on 6/21/2022 and partial ray amputation right great toe 6/23/2022   was [facility]. She states that tried to go to bathroom by herself, had diarrhea and felt weak and slid to the floor.   6/21 s/p Femoral popliteal bypass with prosthetic graft  6/23 s/p R 1st toe ray amputation, Wound culture from OR with MSSA and enterococcus fecalis   Pathology showed OM in margines so need 6 weeks treatment from the start   Was on Unasyn to complete 6 weeks on 7/26   Later grew Klebsiella and enterobacter and Stenotrophomonas not clear if colonization or real infection, wound has improved but still has some purulent malodorous discharge.   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   Was on Unasyn to complete 6 weeks on 7/26.   Due to positive OM in stump in R 1st metatarsal bone in MRI done on 9/15, podiatry recommended surgery but she is refusing any surgical intervention. So will start ABx again. She   agrees with the plan, discussed possible side effects.      Recommendations:  - Blood cultures NGTD  - Monitor WBC and Tmax both normal now    - Podiatry follow up noted  - Will start Levaquin 500mg stat followed by 250mg daily for total 6 weeks   - Will need ECG weekly (cQT 425, normal)    Will follow PRN.    Geovany Long MD  Division of Infectious Diseases   Please call ID service at [phone] with any question.      35 minutes spent on total encounter assessing patient, examination, chart reivew, counseling and coordinating care by the attending physician/nurse/care manager.

## 2022-09-17 ENCOUNTER — TRANSCRIPTION ENCOUNTER (OUTPATIENT)
Age: 72
End: 2022-09-17

## 2022-09-17 VITALS
RESPIRATION RATE: 17 BRPM | TEMPERATURE: 99 F | DIASTOLIC BLOOD PRESSURE: 77 MMHG | SYSTOLIC BLOOD PRESSURE: 146 MMHG | HEART RATE: 66 BPM | OXYGEN SATURATION: 96 %

## 2022-09-17 LAB
ANION GAP SERPL CALC-SCNC: 5 MMOL/L — SIGNIFICANT CHANGE UP (ref 5–17)
BUN SERPL-MCNC: 46 MG/DL — HIGH (ref 7–23)
CALCIUM SERPL-MCNC: 9.6 MG/DL — SIGNIFICANT CHANGE UP (ref 8.5–10.1)
CHLORIDE SERPL-SCNC: 99 MMOL/L — SIGNIFICANT CHANGE UP (ref 96–108)
CO2 SERPL-SCNC: 34 MMOL/L — HIGH (ref 22–31)
CREAT SERPL-MCNC: 4.7 MG/DL — HIGH (ref 0.5–1.3)
EGFR: 9 ML/MIN/1.73M2 — LOW
GLUCOSE SERPL-MCNC: 196 MG/DL — HIGH (ref 70–99)
HCT VFR BLD CALC: 29.2 % — LOW (ref 34.5–45)
HGB BLD-MCNC: 9.5 G/DL — LOW (ref 11.5–15.5)
MCHC RBC-ENTMCNC: 30.8 PG — SIGNIFICANT CHANGE UP (ref 27–34)
MCHC RBC-ENTMCNC: 32.5 GM/DL — SIGNIFICANT CHANGE UP (ref 32–36)
MCV RBC AUTO: 94.8 FL — SIGNIFICANT CHANGE UP (ref 80–100)
NRBC # BLD: 0 /100 WBCS — SIGNIFICANT CHANGE UP (ref 0–0)
PLATELET # BLD AUTO: 264 K/UL — SIGNIFICANT CHANGE UP (ref 150–400)
POTASSIUM SERPL-MCNC: 4.8 MMOL/L — SIGNIFICANT CHANGE UP (ref 3.5–5.3)
POTASSIUM SERPL-SCNC: 4.8 MMOL/L — SIGNIFICANT CHANGE UP (ref 3.5–5.3)
RBC # BLD: 3.08 M/UL — LOW (ref 3.8–5.2)
RBC # FLD: 16.5 % — HIGH (ref 10.3–14.5)
SARS-COV-2 RNA SPEC QL NAA+PROBE: SIGNIFICANT CHANGE UP
SODIUM SERPL-SCNC: 138 MMOL/L — SIGNIFICANT CHANGE UP (ref 135–145)
WBC # BLD: 9.87 K/UL — SIGNIFICANT CHANGE UP (ref 3.8–10.5)
WBC # FLD AUTO: 9.87 K/UL — SIGNIFICANT CHANGE UP (ref 3.8–10.5)

## 2022-09-17 PROCEDURE — 85027 COMPLETE CBC AUTOMATED: CPT

## 2022-09-17 PROCEDURE — 99261: CPT

## 2022-09-17 PROCEDURE — 80048 BASIC METABOLIC PNL TOTAL CA: CPT

## 2022-09-17 PROCEDURE — U0003: CPT

## 2022-09-17 PROCEDURE — 87040 BLOOD CULTURE FOR BACTERIA: CPT

## 2022-09-17 PROCEDURE — 86704 HEP B CORE ANTIBODY TOTAL: CPT

## 2022-09-17 PROCEDURE — 99285 EMERGENCY DEPT VISIT HI MDM: CPT | Mod: 25

## 2022-09-17 PROCEDURE — 83036 HEMOGLOBIN GLYCOSYLATED A1C: CPT

## 2022-09-17 PROCEDURE — 99232 SBSQ HOSP IP/OBS MODERATE 35: CPT | Mod: 24

## 2022-09-17 PROCEDURE — 85025 COMPLETE CBC W/AUTO DIFF WBC: CPT

## 2022-09-17 PROCEDURE — 82962 GLUCOSE BLOOD TEST: CPT

## 2022-09-17 PROCEDURE — 80053 COMPREHEN METABOLIC PANEL: CPT

## 2022-09-17 PROCEDURE — 85652 RBC SED RATE AUTOMATED: CPT

## 2022-09-17 PROCEDURE — 73721 MRI JNT OF LWR EXTRE W/O DYE: CPT

## 2022-09-17 PROCEDURE — 80069 RENAL FUNCTION PANEL: CPT

## 2022-09-17 PROCEDURE — U0005: CPT

## 2022-09-17 PROCEDURE — 87340 HEPATITIS B SURFACE AG IA: CPT

## 2022-09-17 PROCEDURE — 86803 HEPATITIS C AB TEST: CPT

## 2022-09-17 PROCEDURE — 93926 LOWER EXTREMITY STUDY: CPT

## 2022-09-17 PROCEDURE — 86706 HEP B SURFACE ANTIBODY: CPT

## 2022-09-17 PROCEDURE — 85610 PROTHROMBIN TIME: CPT

## 2022-09-17 PROCEDURE — 36415 COLL VENOUS BLD VENIPUNCTURE: CPT

## 2022-09-17 PROCEDURE — 73718 MRI LOWER EXTREMITY W/O DYE: CPT

## 2022-09-17 RX ORDER — LEVOFLOXACIN 5 MG/ML
1 INJECTION, SOLUTION INTRAVENOUS
Qty: 0 | Refills: 0 | DISCHARGE
Start: 2022-09-17

## 2022-09-17 RX ORDER — COLLAGENASE CLOSTRIDIUM HIST. 250 UNIT/G
1 OINTMENT (GRAM) TOPICAL
Qty: 0 | Refills: 0 | DISCHARGE

## 2022-09-17 RX ORDER — NYSTATIN CREAM 100000 [USP'U]/G
1 CREAM TOPICAL
Qty: 0 | Refills: 0 | DISCHARGE
Start: 2022-09-17

## 2022-09-17 RX ADMIN — ZINC SULFATE TAB 220 MG (50 MG ZINC EQUIVALENT) 220 MILLIGRAM(S): 220 (50 ZN) TAB at 11:29

## 2022-09-17 RX ADMIN — PANTOPRAZOLE SODIUM 40 MILLIGRAM(S): 20 TABLET, DELAYED RELEASE ORAL at 05:38

## 2022-09-17 RX ADMIN — Medication 667 MILLIGRAM(S): at 13:23

## 2022-09-17 RX ADMIN — Medication 3: at 13:23

## 2022-09-17 RX ADMIN — NYSTATIN CREAM 1 APPLICATION(S): 100000 CREAM TOPICAL at 05:47

## 2022-09-17 RX ADMIN — Medication 1 TABLET(S): at 05:37

## 2022-09-17 RX ADMIN — Medication 81 MILLIGRAM(S): at 11:29

## 2022-09-17 RX ADMIN — Medication 1: at 08:56

## 2022-09-17 RX ADMIN — HEPARIN SODIUM 5000 UNIT(S): 5000 INJECTION INTRAVENOUS; SUBCUTANEOUS at 05:38

## 2022-09-17 RX ADMIN — RISPERIDONE 0.5 MILLIGRAM(S): 4 TABLET ORAL at 05:37

## 2022-09-17 RX ADMIN — Medication 0.1 MILLIGRAM(S): at 05:38

## 2022-09-17 RX ADMIN — Medication 100 MILLIGRAM(S): at 05:38

## 2022-09-17 RX ADMIN — Medication 50 MILLIGRAM(S): at 11:28

## 2022-09-17 RX ADMIN — Medication 1 TABLET(S): at 11:29

## 2022-09-17 RX ADMIN — Medication 667 MILLIGRAM(S): at 08:55

## 2022-09-17 NOTE — DISCHARGE NOTE PROVIDER - NSDCCPCAREPLAN_GEN_ALL_CORE_FT
PRINCIPAL DISCHARGE DIAGNOSIS  Diagnosis: Acute on chronic osteomyelitis  Assessment and Plan of Treatment:       SECONDARY DISCHARGE DIAGNOSES  Diagnosis: ESRD on dialysis  Assessment and Plan of Treatment:     Diagnosis: Infected open wound  Assessment and Plan of Treatment:     Diagnosis: Uremia  Assessment and Plan of Treatment:     Diagnosis: Hyperkalemia  Assessment and Plan of Treatment:     Diagnosis: DM (diabetes mellitus)  Assessment and Plan of Treatment:     Diagnosis: Fall  Assessment and Plan of Treatment:     Diagnosis: PVD (peripheral vascular disease)  Assessment and Plan of Treatment:

## 2022-09-17 NOTE — PROGRESS NOTE ADULT - NUTRITIONAL ASSESSMENT
MEDICATIONS  (STANDING):  aspirin enteric coated 81 milliGRAM(s) Oral daily  atorvastatin 40 milliGRAM(s) Oral at bedtime  calcium acetate 667 milliGRAM(s) Oral three times a day with meals  cloNIDine 0.1 milliGRAM(s) Oral two times a day  dextrose 5%. 1000 milliLiter(s) (50 mL/Hr) IV Continuous <Continuous>  dextrose 5%. 1000 milliLiter(s) (100 mL/Hr) IV Continuous <Continuous>  dextrose 50% Injectable 25 Gram(s) IV Push once  dextrose 50% Injectable 12.5 Gram(s) IV Push once  dextrose 50% Injectable 25 Gram(s) IV Push once  glucagon  Injectable 1 milliGRAM(s) IntraMuscular once  heparin  Injectable (Preservative-Free) 5000 Unit(s) SubCutaneous two times a day  imipramine 50 milliGRAM(s) Oral daily  insulin lispro (ADMELOG) corrective regimen sliding scale   SubCutaneous three times a day before meals  lactobacillus acidophilus 1 Tablet(s) Oral three times a day  metoprolol tartrate 100 milliGRAM(s) Oral every 12 hours  multivitamin 1 Tablet(s) Oral daily  nystatin Powder 1 Application(s) Topical two times a day  pantoprazole    Tablet 40 milliGRAM(s) Oral before breakfast  risperiDONE   Tablet 0.5 milliGRAM(s) Oral two times a day  senna 2 Tablet(s) Oral at bedtime  zinc sulfate 220 milliGRAM(s) Oral daily

## 2022-09-17 NOTE — PROGRESS NOTE ADULT - PROBLEM SELECTOR PLAN 1
Patient seen and evaluated   Dressed bilateral wounds with aquacel and DSD   Left ankle MRI: No OM, Right foot: +ve OM in  1 cm focus of osteomyelitis of the distal first metatarsal stump.  Discussed with patient for surgical intervention and bone biopsy, patient refused. Discussed the risks  and complications associated with refusal of surgical treatment and bone biopsy. Patient's recent MRI was positive  for OM . Patient only would prefer conservative treatment at this time. Patient also informed that there is still risk of  more severe infections, proximal amputations, loss of limb, sepsis and loss of life. Patient verbalized understanding  and will follow up in wound care clinic   Recommend IV antibiotics per ID to treat the OM   C/w IV abx per infectious disease  No surgical intervention at this admission      Wound Care Instructions:  -Keep dressing clean, dry, and intact to both foot  - Clean wounds with normal saline. Milly it dry with gauze. Change the dressing with silver alginate to bilateral foot wound every other day  -Patient must remain Partial weight bearing as tolerated to the left heel in surgical shoe, full weight bearing to the right foot  -Monitor for any signs of infection including redness, swelling in the leg above the bandage, nausea/vomiting/fever/chills/chest pain/shortness of breath, if any are present patient must report to the emergency department immediately  -Patient is to follow up with  Dr. Duffy On Wednesday 9/22 after discharge at [facility] , please call before coming [phone]

## 2022-09-17 NOTE — PROGRESS NOTE ADULT - REASON FOR ADMISSION
s/p fall

## 2022-09-17 NOTE — DISCHARGE NOTE PROVIDER - DISCHARGE DIET
Soft and Bite-Sized Diet/Consistent Carbohydrate Diabetic Diets/Renal Diets (for dialysis)/Mildly Thick Liquids

## 2022-09-17 NOTE — PROGRESS NOTE ADULT - PROBLEM SELECTOR PROBLEM 6
DM (diabetes mellitus)
Prophylactic measure
DM (diabetes mellitus)

## 2022-09-17 NOTE — PROGRESS NOTE ADULT - PROBLEM SELECTOR PLAN 4
completed 6 weeks of abx on 7/26 for OM .   ELEVATION OF WBC  could be infectious or reactional. ID cons is appreciated   - Blood cultures x 2   - Monitor WBC and Tmax  - IF diarrhea recurs will send stool studies   - Will watch closely off ABx for now   -  Will start broad spectrum ABx if hemodynamically unstable   - Seen by podiatry recommending MRI or both feet

## 2022-09-17 NOTE — DISCHARGE NOTE PROVIDER - HOSPITAL COURSE
Patient was brought to [place] ED from [facility] for fall today.  As per pt, pt had to go to bathroom, but wheel chair was unreachable, so pt ambulated to bathroom, had one diarrheal episode , got weaker, and slid to the floor. pt denies any pain in the ED.  No head trauma, headache or neck pain.  pt is dnr .Recent admission 07/14/2022  -71 year old female with PMH of Afib (not on AC for hx of multiple falls), T2DM, HTN, HLD, ESRD on HD (MWF), CHF, CAD s/p CABG, PAD S/P R-->L bifem-fem BK pop bypass, recent fempop bypass (6/21/2022), and partial ray amputation right great toe (6/22/2022) Pathology Final Diagnosis  06/23/22 1. Right hallux -Acute and chronic osteomyelitis. -Gangrenous skin and soft tissue. 2. Right first metatarsal/clean margin: -Minimal chronic osteomyelitis. Was on Unasyn to complete 6 weeks on 7/26 - Wound culture with Klebsiella, enterobacter, and also Stenotrophomonas but unclear if real infection .Followed  by ID at [facility]   - Vascular surgery and podiatry follow up requested since patient was supposed to be scheduled for followup within next 1-2 weeks       Problem/Plan - 1:  ·  Problem: Infected open wound.   ·  Plan: 1)left ankle wound -MRI 09/15/2022 OM resolved   2)right post op open wound at the stump of the hallux MRI 09/15/2022 Deep soft tissue wound at the site of prior transmetatarsal amputation of   the first ray, with approximately 1 cm focus of osteomyelitis of the   distal first metatarsal stump,   ·  seen by podiatry and ID    IV abx -TBD   Vascular on board-no interventions   Possible surgical intervention, pending  MRI result.    Problem/Plan - 2:  ·  Problem: ESRD on dialysis.   ·  Plan: HD as per schedule.    Problem/Plan - 3:  ·  Problem: Acute on chronic osteomyelitis.   ·  Plan: 1)left ankle wound -MRI 09/15/2022 OM resolved   2)right post op open wound at the stump of the hallux MRI 09/15/2022 Deep soft tissue wound at the site of prior transmetatarsal amputation of   the first ray, with approximately 1 cm focus of osteomyelitis of the   distal first metatarsal stump,   ·  seen by podiatry and ID    IV abx -TBD   Vascular on board-no interventions   Possible surgical intervention, pending  MRI result.    Problem/Plan - 4:  ·  Problem: Leucocytosis.   ·  Plan: completed 6 weeks of abx on 7/26 for OM .   ELEVATION OF WBC  could be infectious or reactional. ID cons is appreciated   - Blood cultures x 2   - Monitor WBC and Tmax  - IF diarrhea recurs will send stool studies   - Will watch closely off ABx for now   -  Will start broad spectrum ABx if hemodynamically unstable   - Seen by podiatry recommending MRI or both feet.    Problem/Plan - 5:  ·  Problem: PVD (peripheral vascular disease).   ·  Plan: Seen by vascular  team - Arterial duplex reviewed  RLE bypass graft open  No further vascular surgery testing/intervention needed.    Problem/Plan - 6:  ·  Problem: DM (diabetes mellitus).   ·  Plan: Accu-Cheks monitoring and insulin corrective regimen  sliding scale coverage with short acting insulin, add long-acting insulin as needed ,no concentrated sweets diet, serial labs ,HbA1C,education.    Problem/Plan - 7:  ·  Problem: Fall.   ·  Plan: fall precautions ,PT/OT.    Problem/Plan - 8:  ·  Problem: Prophylactic measure.   ·  Plan: Gastrointestinal stress ulcer prophylaxis and DVT prophylaxis administered.

## 2022-09-17 NOTE — PROGRESS NOTE ADULT - SUBJECTIVE AND OBJECTIVE BOX
Patient is a 71y Female whom presented to the hospital with esrd on hd     PAST MEDICAL & SURGICAL HISTORY:  Diabetes mellitus II      HTN (hypertension)      h/o Anxiety attack      Depression      h/o Myocardial infarct 2007      CAD (coronary artery disease)      h/o Hepatitis A 1969  currently resolved, no symptoms      PAD (peripheral artery disease)      Murmur, cardiac      h/o Smoking  quitted 3/2012      CRF (chronic renal failure), unspecified stage      Dialysis patient      Anemia secondary to renal failure      HTN (hypertension)      Osteomyelitis      ESRD on dialysis      Falls      Ataxia      coronary stent 2007      s/p Ovarian cyst removal      s/p surgical removal of benign Skin lesion epigastric area      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          MEDICATIONS  (STANDING):  aspirin enteric coated 81 milliGRAM(s) Oral daily  atorvastatin 40 milliGRAM(s) Oral at bedtime  calcium acetate 667 milliGRAM(s) Oral three times a day with meals  cloNIDine 0.1 milliGRAM(s) Oral two times a day  dextrose 5%. 1000 milliLiter(s) (50 mL/Hr) IV Continuous <Continuous>  dextrose 5%. 1000 milliLiter(s) (100 mL/Hr) IV Continuous <Continuous>  dextrose 50% Injectable 25 Gram(s) IV Push once  dextrose 50% Injectable 12.5 Gram(s) IV Push once  dextrose 50% Injectable 25 Gram(s) IV Push once  glucagon  Injectable 1 milliGRAM(s) IntraMuscular once  heparin  Injectable (Preservative-Free) 5000 Unit(s) SubCutaneous two times a day  imipramine 50 milliGRAM(s) Oral daily  insulin lispro (ADMELOG) corrective regimen sliding scale   SubCutaneous three times a day before meals  lactobacillus acidophilus 1 Tablet(s) Oral three times a day  metoprolol tartrate 100 milliGRAM(s) Oral every 12 hours  multivitamin 1 Tablet(s) Oral daily  pantoprazole    Tablet 40 milliGRAM(s) Oral before breakfast  risperiDONE   Tablet 0.5 milliGRAM(s) Oral two times a day  senna 2 Tablet(s) Oral at bedtime  zinc sulfate 220 milliGRAM(s) Oral daily      Allergies    latex (Unknown)  No Known Drug Allergies    Intolerances        SOCIAL HISTORY:  Denies ETOh,Smoking,     FAMILY HISTORY:      REVIEW OF SYSTEMS:    CONSTITUTIONAL: No weakness, fevers or chills  RESPIRATORY: No cough, wheezing, hemoptysis; No shortness of breath  CARDIOVASCULAR: No chest pain or palpitations  GASTROINTESTINAL: No abdominal or epigastric pain. No nausea, vomiting,     No diarrhea or constipation. No melena   SKIN: dry                                                                   9.5    9.87  )-----------( 264      ( 17 Sep 2022 08:23 )             29.2       CBC Full  -  ( 17 Sep 2022 08:23 )  WBC Count : 9.87 K/uL  RBC Count : 3.08 M/uL  Hemoglobin : 9.5 g/dL  Hematocrit : 29.2 %  Platelet Count - Automated : 264 K/uL  Mean Cell Volume : 94.8 fl  Mean Cell Hemoglobin : 30.8 pg  Mean Cell Hemoglobin Concentration : 32.5 gm/dL  Auto Neutrophil # : x  Auto Lymphocyte # : x  Auto Monocyte # : x  Auto Eosinophil # : x  Auto Basophil # : x  Auto Neutrophil % : x  Auto Lymphocyte % : x  Auto Monocyte % : x  Auto Eosinophil % : x  Auto Basophil % : x      09-17    138  |  99  |  46<H>  ----------------------------<  196<H>  4.8   |  34<H>  |  4.70<H>    Ca    9.6      17 Sep 2022 08:23  Phos  4.0     09-16    TPro  x   /  Alb  2.6<L>  /  TBili  x   /  DBili  x   /  AST  x   /  ALT  x   /  AlkPhos  x   09-16      CAPILLARY BLOOD GLUCOSE      POCT Blood Glucose.: 284 mg/dL (17 Sep 2022 12:05)  POCT Blood Glucose.: 186 mg/dL (17 Sep 2022 08:06)  POCT Blood Glucose.: 225 mg/dL (16 Sep 2022 22:25)  POCT Blood Glucose.: 252 mg/dL (16 Sep 2022 16:50)  POCT Blood Glucose.: 163 mg/dL (16 Sep 2022 13:26)      Vital Signs Last 24 Hrs  T(C): 37.1 (17 Sep 2022 05:45), Max: 37.3 (17 Sep 2022 05:24)  T(F): 98.8 (17 Sep 2022 05:45), Max: 99.1 (17 Sep 2022 05:24)  HR: 68 (17 Sep 2022 05:45) (68 - 104)  BP: 151/67 (17 Sep 2022 05:45) (106/40 - 151/67)  BP(mean): --  RR: 16 (17 Sep 2022 05:45) (16 - 18)  SpO2: 96% (17 Sep 2022 05:45) (93% - 100%)    Parameters below as of 17 Sep 2022 05:45  Patient On (Oxygen Delivery Method): room air                           PHYSICAL EXAM:    Constitutional: NAD  HEENT: conjunctive   clear   Neck:  No JVD  Respiratory: CTAB  Cardiovascular: S1 and S2  Gastrointestinal: BS+, soft, NT/ND  Extremities: No peripheral edema

## 2022-09-17 NOTE — PROGRESS NOTE ADULT - PROBLEM SELECTOR PROBLEM 2
PVD (peripheral vascular disease)
ESRD on dialysis
PVD (peripheral vascular disease)
PVD (peripheral vascular disease)
ESRD on dialysis

## 2022-09-17 NOTE — DISCHARGE NOTE NURSING/CASE MANAGEMENT/SOCIAL WORK - NSDCFUADDAPPT_GEN_ALL_CORE_FT
1)PLEASE ORDER IN HOUSE PODIATRY ,WOUND CARE CONSULT UPON READMISSION TO [facility] 2) PLEASE ORDER EKG WEEKLY FOR 6 WEEKS WHILE ON LEVAQUIN TO MONITOR QT INTERVAL

## 2022-09-17 NOTE — DISCHARGE NOTE PROVIDER - NSDCFUSCHEDAPPT_GEN_ALL_CORE_FT
[facility]  VASCULAR [address] [facility] [address]  Scheduled Appointment: 09/21/2022    Adilene Green  [facility]  VASCULAR [address] [place] [address]  Scheduled Appointment: 09/21/2022

## 2022-09-17 NOTE — PROGRESS NOTE ADULT - PROBLEM SELECTOR PLAN 2
HD as per schedule
Arterial duplex reviewed  RLE bypass graft open  No further vascular surgery testing/intervention needed  Will sign off   Reconsult prn.
HD as per schedule
Arterial duplex reviewed  RLE bypass graft open  No further vascular surgery testing/intervention needed  Will sign off   Reconsult prn.
Arterial duplex reviewed  RLE bypass graft open  No further vascular surgery testing/intervention needed  Will sign off   Reconsult prn.
HD as per schedule
HD as per schedule

## 2022-09-17 NOTE — PROGRESS NOTE ADULT - PROBLEM SELECTOR PROBLEM 5
PVD (peripheral vascular disease)
DM (diabetes mellitus)
PVD (peripheral vascular disease)

## 2022-09-17 NOTE — PROGRESS NOTE ADULT - PROVIDER SPECIALTY LIST ADULT
Cardiology
Infectious Disease
Nephrology
Podiatry
Infectious Disease
Cardiology
Nephrology
Endocrinology
Hospitalist
Nephrology
Podiatry
Cardiology
Hospitalist
Podiatry
Hospitalist

## 2022-09-17 NOTE — DISCHARGE NOTE PROVIDER - NSDCMRMEDTOKEN_GEN_ALL_CORE_FT
acetaminophen 325 mg oral tablet: 2 tab(s) orally every 6 hours, As needed, Temp greater or equal to 38C (100.4F), Mild Pain (1 - 3)  Acidophilus oral capsule: 2 cap(s) orally once a day  aspirin 81 mg oral delayed release tablet: 1 tab(s) orally once a day (at bedtime)  atorvastatin 40 mg oral tablet: 1 tab(s) orally once a day (at bedtime)  bisacodyl 10 mg rectal suppository: 1 suppository(ies) rectal once a day, As Needed  calcium acetate 667 mg oral tablet: 1 tab(s) orally 3 times a day  cloNIDine 0.1 mg oral tablet: 1 tab(s) orally 2 times a day  imipramine 50 mg oral tablet: 1 tab(s) orally once a day  insulin glargine-yfgn 100 units/mL subcutaneous solution: 12 unit(s) subcutaneous once a day (at bedtime)  insulin lispro 100 units/mL injectable solution: injectable 3 times a day (before meals)  Melatonin 3 mg oral tablet: 1 tab(s) orally once a day (at bedtime), As Needed  metoprolol tartrate 100 mg oral tablet: 1 tab(s) orally every 12 hours  Nephro-Alfie oral tablet: 1 tab(s) orally once a day  pantoprazole 40 mg oral delayed release tablet: 1 tab(s) orally once a day (before a meal)  risperiDONE 0.5 mg oral tablet: 1 tab(s) orally once a day (at bedtime)  Santyl 250 units/g topical ointment: Apply topically to affected area on Right Distal Foot once a day, As Needed  Senna 8.6 mg oral tablet: 2 tab(s) orally once a day (at bedtime)  traMADol 50 mg oral tablet: 1 tab(s) orally 3 times a day, As Needed  zinc sulfate 220 mg oral capsule: 1 cap(s) orally once a day   acetaminophen 325 mg oral tablet: 2 tab(s) orally every 6 hours, As needed, Temp greater or equal to 38C (100.4F), Mild Pain (1 - 3)  Acidophilus oral capsule: 2 cap(s) orally once a day  aspirin 81 mg oral delayed release tablet: 1 tab(s) orally once a day (at bedtime)  atorvastatin 40 mg oral tablet: 1 tab(s) orally once a day (at bedtime)  bisacodyl 10 mg rectal suppository: 1 suppository(ies) rectal once a day, As Needed  calcium acetate 667 mg oral tablet: 1 tab(s) orally 3 times a day  cloNIDine 0.1 mg oral tablet: 1 tab(s) orally 2 times a day  imipramine 50 mg oral tablet: 1 tab(s) orally once a day  insulin glargine-yfgn 100 units/mL subcutaneous solution: 12 unit(s) subcutaneous once a day (at bedtime)  insulin lispro 100 units/mL injectable solution: injectable 3 times a day (before meals)  levoFLOXacin 250 mg oral tablet: 1 tab(s) orally every 48 hours x 6 weeks   Melatonin 3 mg oral tablet: 1 tab(s) orally once a day (at bedtime), As Needed  metoprolol tartrate 100 mg oral tablet: 1 tab(s) orally every 12 hours  Nephro-Alfie oral tablet: 1 tab(s) orally once a day  nystatin 100,000 units/g topical powder: 1 application topically 2 times a day  pantoprazole 40 mg oral delayed release tablet: 1 tab(s) orally once a day (before a meal)  risperiDONE 0.5 mg oral tablet: 1 tab(s) orally once a day (at bedtime)  Senna 8.6 mg oral tablet: 2 tab(s) orally once a day (at bedtime)  traMADol 50 mg oral tablet: 1 tab(s) orally 3 times a day, As Needed  zinc sulfate 220 mg oral capsule: 1 cap(s) orally once a day

## 2022-09-17 NOTE — PROGRESS NOTE ADULT - SUBJECTIVE AND OBJECTIVE BOX
PROGRESS NOTE  Patient is a 71y old  Female who presents with a chief complaint of s/p fall (16 Sep 2022 12:35)    Chart and available morning labs /imaging are reviewed electronically , urgent issues addressed . More information  is being added upon completion of rounds , when more information is collected and management discussed with consultants , medical staff and social service/case management on the floor   OVERNIGHT  No new issues reported by medical staff . All above noted Patient is resting in a bed comfortably  .No distress noted     HPI:  Patient was brought to [place] ED from [facility] for fall today.  As per pt, pt had to go to bathroom, but wheel chair was unreachable, so pt ambulated to bathroom, had one diarrheal episode , got weaker, and slid to the floor. pt denies any pain in the ED.  No head trauma, headache or neck pain.  pt is dnr .Recent admission 07/14/2022  -71 year old female with PMH of Afib (not on AC for hx of multiple falls), T2DM, HTN, HLD, ESRD on HD (MWF), CHF, CAD s/p CABG, PAD S/P R-->L bifem-fem BK pop bypass, recent fempop bypass (6/21/2022), and partial ray amputation right great toe (6/22/2022) Pathology Final Diagnosis  06/23/22 1. Right hallux -Acute and chronic osteomyelitis. -Gangrenous skin and soft tissue. 2. Right first metatarsal/clean margin: -Minimal chronic osteomyelitis. Was on Unasyn to complete 6 weeks on 7/26 - Wound culture with Klebsiella, enterobacter, and also Stenotrophomonas but unclear if real infection .Followed  by ID at [facility]   - Vascular surgery and podiatry follow up requested since patient was supposed to be scheduled for followup within next 1-2 weeks    (12 Sep 2022 19:25)    PAST MEDICAL & SURGICAL HISTORY:  Diabetes mellitus II      HTN (hypertension)      h/o Anxiety attack      Depression      h/o Myocardial infarct 2007      CAD (coronary artery disease)      h/o Hepatitis A 1969  currently resolved, no symptoms      PAD (peripheral artery disease)      Murmur, cardiac      h/o Smoking  quitted 3/2012      CRF (chronic renal failure), unspecified stage      Dialysis patient      Anemia secondary to renal failure      HTN (hypertension)      Osteomyelitis      ESRD on dialysis      Falls      Ataxia      coronary stent 2007      s/p Ovarian cyst removal      s/p surgical removal of benign Skin lesion epigastric area      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          MEDICATIONS  (STANDING):  aspirin enteric coated 81 milliGRAM(s) Oral daily  atorvastatin 40 milliGRAM(s) Oral at bedtime  calcium acetate 667 milliGRAM(s) Oral three times a day with meals  cloNIDine 0.1 milliGRAM(s) Oral two times a day  dextrose 5%. 1000 milliLiter(s) (50 mL/Hr) IV Continuous <Continuous>  dextrose 5%. 1000 milliLiter(s) (100 mL/Hr) IV Continuous <Continuous>  dextrose 50% Injectable 25 Gram(s) IV Push once  dextrose 50% Injectable 12.5 Gram(s) IV Push once  dextrose 50% Injectable 25 Gram(s) IV Push once  epoetin fawad-epbx (RETACRIT) Injectable 68445 Unit(s) IV Push <User Schedule>  glucagon  Injectable 1 milliGRAM(s) IntraMuscular once  heparin   Injectable 5000 Unit(s) SubCutaneous every 12 hours  imipramine 50 milliGRAM(s) Oral daily  insulin glargine Injectable (LANTUS) 12 Unit(s) SubCutaneous at bedtime  insulin lispro (ADMELOG) corrective regimen sliding scale   SubCutaneous three times a day before meals  lactobacillus acidophilus 1 Tablet(s) Oral three times a day  metoprolol tartrate 100 milliGRAM(s) Oral every 12 hours  multivitamin 1 Tablet(s) Oral daily  nystatin Powder 1 Application(s) Topical two times a day  pantoprazole    Tablet 40 milliGRAM(s) Oral before breakfast  risperiDONE   Tablet 0.5 milliGRAM(s) Oral two times a day  senna 2 Tablet(s) Oral at bedtime  zinc sulfate 220 milliGRAM(s) Oral daily    MEDICATIONS  (PRN):  acetaminophen     Tablet .. 650 milliGRAM(s) Oral every 6 hours PRN Temp greater or equal to 38C (100.4F), Mild Pain (1 - 3)  aluminum hydroxide/magnesium hydroxide/simethicone Suspension 30 milliLiter(s) Oral every 4 hours PRN Dyspepsia  bisacodyl Suppository 10 milliGRAM(s) Rectal daily PRN Constipation  dextrose Oral Gel 15 Gram(s) Oral once PRN Blood Glucose LESS THAN 70 milliGRAM(s)/deciliter  melatonin 3 milliGRAM(s) Oral at bedtime PRN Insomnia  ondansetron Injectable 4 milliGRAM(s) IV Push every 8 hours PRN Nausea and/or Vomiting  traMADol 50 milliGRAM(s) Oral three times a day PRN Moderate Pain (4 - 6)      OBJECTIVE    T(C): 37.1 (09-17-22 @ 05:45), Max: 37.3 (09-17-22 @ 05:24)  HR: 68 (09-17-22 @ 05:45) (64 - 104)  BP: 151/67 (09-17-22 @ 05:45) (106/40 - 151/67)  RR: 16 (09-17-22 @ 05:45) (16 - 18)  SpO2: 96% (09-17-22 @ 05:45) (93% - 100%)  Wt(kg): --  I&O's Summary    16 Sep 2022 07:01  -  17 Sep 2022 07:00  --------------------------------------------------------  IN: 0 mL / OUT: 1500 mL / NET: -1500 mL          REVIEW OF SYSTEMS:  CONSTITUTIONAL: No fever, weight loss, or fatigue  EYES: No eye pain, visual disturbances, or discharge  ENMT:   No sinus or throat pain  NECK: No pain or stiffness  RESPIRATORY: No cough, wheezing, chills or hemoptysis; No shortness of breath  CARDIOVASCULAR: No chest pain, palpitations, dizziness, or leg swelling  GASTROINTESTINAL: No abdominal pain. No nausea, vomiting; No diarrhea or constipation. No melena or hematochezia.  GENITOURINARY: No dysuria, frequency, hematuria, or incontinence  NEUROLOGICAL: No headaches, memory loss, loss of strength, numbness, or tremors  SKIN: No itching, burning, rashes, or lesions   MUSCULOSKELETAL: No joint pain or swelling; No muscle, back, or extremity pain    PHYSICAL EXAM:  Appearance: NAD. VS past 24 hrs -as above   HEENT:   Moist oral mucosa. Conjunctiva clear b/l.   Neck : supple  Respiratory: Lungs CTAB.  Gastrointestinal:  Soft, nontender. No rebound. No rigidity. BS present	  Cardiovascular: RRR ,S1S2 present  Neurologic: Non-focal. Moving all extremities.  Extremities: No edema. No erythema. No calf tenderness.  Skin: No rashes, No ecchymoses, No cyanosis.	  wounds ,skin lesions-See skin assesment flow sheet   LABS:    09-16    135  |  97  |  62<H>  ----------------------------<  238<H>  4.6   |  30  |  6.00<H>    Ca    9.2      16 Sep 2022 10:10  Phos  4.0     09-16    TPro  x   /  Alb  2.6<L>  /  TBili  x   /  DBili  x   /  AST  x   /  ALT  x   /  AlkPhos  x   09-16    CAPILLARY BLOOD GLUCOSE      POCT Blood Glucose.: 186 mg/dL (17 Sep 2022 08:06)  POCT Blood Glucose.: 225 mg/dL (16 Sep 2022 22:25)  POCT Blood Glucose.: 252 mg/dL (16 Sep 2022 16:50)  POCT Blood Glucose.: 163 mg/dL (16 Sep 2022 13:26)  POCT Blood Glucose.: 160 mg/dL (16 Sep 2022 12:19)          Culture - Blood (collected 14 Sep 2022 20:58)  Source: .Blood Blood  Preliminary Report (16 Sep 2022 01:02):    No growth to date.    Culture - Blood (collected 14 Sep 2022 20:50)  Source: .Blood Blood  Preliminary Report (16 Sep 2022 01:02):    No growth to date.      RADIOLOGY & ADDITIONAL TESTS:   reviewed elctronically  ASSESSMENT/PLAN: 	     PROGRESS NOTE  Patient is a 71y old  Female who presents with a chief complaint of s/p fall (16 Sep 2022 12:35)    Chart and available morning labs /imaging are reviewed electronically , urgent issues addressed . More information  is being added upon completion of rounds , when more information is collected and management discussed with consultants , medical staff and social service/case management on the floor   OVERNIGHT  No new issues reported by medical staff . All above noted Patient is resting in a bed comfortably  .No distress noted   Cleared by ID & PODIATRY FOR D/C   HPI:  Patient was brought to [place] ED from [facility] for fall today.  As per pt, pt had to go to bathroom, but wheel chair was unreachable, so pt ambulated to bathroom, had one diarrheal episode , got weaker, and slid to the floor. pt denies any pain in the ED.  No head trauma, headache or neck pain.  pt is dnr .Recent admission 07/14/2022  -71 year old female with PMH of Afib (not on AC for hx of multiple falls), T2DM, HTN, HLD, ESRD on HD (MWF), CHF, CAD s/p CABG, PAD S/P R-->L bifem-fem BK pop bypass, recent fempop bypass (6/21/2022), and partial ray amputation right great toe (6/22/2022) Pathology Final Diagnosis  06/23/22 1. Right hallux -Acute and chronic osteomyelitis. -Gangrenous skin and soft tissue. 2. Right first metatarsal/clean margin: -Minimal chronic osteomyelitis. Was on Unasyn to complete 6 weeks on 7/26 - Wound culture with Klebsiella, enterobacter, and also Stenotrophomonas but unclear if real infection .Followed  by ID at [facility]   - Vascular surgery and podiatry follow up requested since patient was supposed to be scheduled for followup within next 1-2 weeks    (12 Sep 2022 19:25)    PAST MEDICAL & SURGICAL HISTORY:  Diabetes mellitus II      HTN (hypertension)      h/o Anxiety attack      Depression      h/o Myocardial infarct 2007      CAD (coronary artery disease)      h/o Hepatitis A 1969  currently resolved, no symptoms      PAD (peripheral artery disease)      Murmur, cardiac      h/o Smoking  quitted 3/2012      CRF (chronic renal failure), unspecified stage      Dialysis patient      Anemia secondary to renal failure      HTN (hypertension)      Osteomyelitis      ESRD on dialysis      Falls      Ataxia      coronary stent 2007      s/p Ovarian cyst removal      s/p surgical removal of benign Skin lesion epigastric area      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          MEDICATIONS  (STANDING):  aspirin enteric coated 81 milliGRAM(s) Oral daily  atorvastatin 40 milliGRAM(s) Oral at bedtime  calcium acetate 667 milliGRAM(s) Oral three times a day with meals  cloNIDine 0.1 milliGRAM(s) Oral two times a day  dextrose 5%. 1000 milliLiter(s) (50 mL/Hr) IV Continuous <Continuous>  dextrose 5%. 1000 milliLiter(s) (100 mL/Hr) IV Continuous <Continuous>  dextrose 50% Injectable 25 Gram(s) IV Push once  dextrose 50% Injectable 12.5 Gram(s) IV Push once  dextrose 50% Injectable 25 Gram(s) IV Push once  epoetin fawad-epbx (RETACRIT) Injectable 08599 Unit(s) IV Push <User Schedule>  glucagon  Injectable 1 milliGRAM(s) IntraMuscular once  heparin   Injectable 5000 Unit(s) SubCutaneous every 12 hours  imipramine 50 milliGRAM(s) Oral daily  insulin glargine Injectable (LANTUS) 12 Unit(s) SubCutaneous at bedtime  insulin lispro (ADMELOG) corrective regimen sliding scale   SubCutaneous three times a day before meals  lactobacillus acidophilus 1 Tablet(s) Oral three times a day  metoprolol tartrate 100 milliGRAM(s) Oral every 12 hours  multivitamin 1 Tablet(s) Oral daily  nystatin Powder 1 Application(s) Topical two times a day  pantoprazole    Tablet 40 milliGRAM(s) Oral before breakfast  risperiDONE   Tablet 0.5 milliGRAM(s) Oral two times a day  senna 2 Tablet(s) Oral at bedtime  zinc sulfate 220 milliGRAM(s) Oral daily    MEDICATIONS  (PRN):  acetaminophen     Tablet .. 650 milliGRAM(s) Oral every 6 hours PRN Temp greater or equal to 38C (100.4F), Mild Pain (1 - 3)  aluminum hydroxide/magnesium hydroxide/simethicone Suspension 30 milliLiter(s) Oral every 4 hours PRN Dyspepsia  bisacodyl Suppository 10 milliGRAM(s) Rectal daily PRN Constipation  dextrose Oral Gel 15 Gram(s) Oral once PRN Blood Glucose LESS THAN 70 milliGRAM(s)/deciliter  melatonin 3 milliGRAM(s) Oral at bedtime PRN Insomnia  ondansetron Injectable 4 milliGRAM(s) IV Push every 8 hours PRN Nausea and/or Vomiting  traMADol 50 milliGRAM(s) Oral three times a day PRN Moderate Pain (4 - 6)      OBJECTIVE    T(C): 37.1 (09-17-22 @ 05:45), Max: 37.3 (09-17-22 @ 05:24)  HR: 68 (09-17-22 @ 05:45) (64 - 104)  BP: 151/67 (09-17-22 @ 05:45) (106/40 - 151/67)  RR: 16 (09-17-22 @ 05:45) (16 - 18)  SpO2: 96% (09-17-22 @ 05:45) (93% - 100%)  Wt(kg): --  I&O's Summary    16 Sep 2022 07:01  -  17 Sep 2022 07:00  --------------------------------------------------------  IN: 0 mL / OUT: 1500 mL / NET: -1500 mL          REVIEW OF SYSTEMS:  CONSTITUTIONAL: No fever, weight loss, or fatigue  EYES: No eye pain, visual disturbances, or discharge  ENMT:   No sinus or throat pain  NECK: No pain or stiffness  RESPIRATORY: No cough, wheezing, chills or hemoptysis; No shortness of breath  CARDIOVASCULAR: No chest pain, palpitations, dizziness, or leg swelling  GASTROINTESTINAL: No abdominal pain. No nausea, vomiting; No diarrhea or constipation. No melena or hematochezia.  GENITOURINARY: No dysuria, frequency, hematuria, or incontinence  NEUROLOGICAL: No headaches, memory loss, loss of strength, numbness, or tremors  SKIN: No itching, burning, rashes, or lesions   MUSCULOSKELETAL: No joint pain or swelling; No muscle, back, or extremity pain    PHYSICAL EXAM:  Appearance: NAD. VS past 24 hrs -as above   HEENT:   Moist oral mucosa. Conjunctiva clear b/l.   Neck : supple  Respiratory: Lungs CTAB.  Gastrointestinal:  Soft, nontender. No rebound. No rigidity. BS present	  Cardiovascular: RRR ,S1S2 present  Neurologic: Non-focal. Moving all extremities.  Extremities: No edema. No erythema. No calf tenderness.  Skin: No rashes, No ecchymoses, No cyanosis.	  wounds ,skin lesions-See skin assesment flow sheet   LABS:    09-16    135  |  97  |  62<H>  ----------------------------<  238<H>  4.6   |  30  |  6.00<H>    Ca    9.2      16 Sep 2022 10:10  Phos  4.0     09-16    TPro  x   /  Alb  2.6<L>  /  TBili  x   /  DBili  x   /  AST  x   /  ALT  x   /  AlkPhos  x   09-16    CAPILLARY BLOOD GLUCOSE      POCT Blood Glucose.: 186 mg/dL (17 Sep 2022 08:06)  POCT Blood Glucose.: 225 mg/dL (16 Sep 2022 22:25)  POCT Blood Glucose.: 252 mg/dL (16 Sep 2022 16:50)  POCT Blood Glucose.: 163 mg/dL (16 Sep 2022 13:26)  POCT Blood Glucose.: 160 mg/dL (16 Sep 2022 12:19)          Culture - Blood (collected 14 Sep 2022 20:58)  Source: .Blood Blood  Preliminary Report (16 Sep 2022 01:02):    No growth to date.    Culture - Blood (collected 14 Sep 2022 20:50)  Source: .Blood Blood  Preliminary Report (16 Sep 2022 01:02):    No growth to date.      RADIOLOGY & ADDITIONAL TESTS:   reviewed elctronically  ASSESSMENT/PLAN: 	  Patient was seen and examined on a day of discharge . Plan of care , discharge medications and recommendations discussed with consultants and clearance for discharge obtained .Social service , case management  and medical staff are aware of plan. Family is notified. Discharge summary  is  prepared electronically-see separate document prepared by me .75minutes spent on this visit, 50% visit time spent in care co-ordination with other attendings and counselling patient  I have discussed care plan with patient and HCP,expressed understanding of problems treatment and their effect and side effects, alternatives in detail,I have asked if they have any questions and concerns and appropriately addressed them to best of my ability

## 2022-09-17 NOTE — PROGRESS NOTE ADULT - SUBJECTIVE AND OBJECTIVE BOX
Patient is a 71y Female with a known history of :  Fall [W19.XXXA]    ESRD on dialysis [N18.6]    Acute on chronic osteomyelitis [M86.10]    PVD (peripheral vascular disease) [I73.9]    DM (diabetes mellitus) [E11.9]    Prophylactic measure [Z29.9]    Infected open wound [T14.8XXA]    Infected open wound [T14.8XXA]    Leucocytosis [D72.829]      HPI:  Patient was brought to [place] ED from [facility] for fall today.  As per pt, pt had to go to bathroom, but wheel chair was unreachable, so pt ambulated to bathroom, had one diarrheal episode , got weaker, and slid to the floor. pt denies any pain in the ED.  No head trauma, headache or neck pain.  pt is dnr .Recent admission 07/14/2022  -71 year old female with PMH of Afib (not on AC for hx of multiple falls), T2DM, HTN, HLD, ESRD on HD (MWF), CHF, CAD s/p CABG, PAD S/P R-->L bifem-fem BK pop bypass, recent fempop bypass (6/21/2022), and partial ray amputation right great toe (6/22/2022) Pathology Final Diagnosis  06/23/22 1. Right hallux -Acute and chronic osteomyelitis. -Gangrenous skin and soft tissue. 2. Right first metatarsal/clean margin: -Minimal chronic osteomyelitis. Was on Unasyn to complete 6 weeks on 7/26 - Wound culture with Klebsiella, enterobacter, and also Stenotrophomonas but unclear if real infection .Followed  by ID at [facility]   - Vascular surgery and podiatry follow up requested since patient was supposed to be scheduled for followup within next 1-2 weeks    (12 Sep 2022 19:25)      REVIEW OF SYSTEMS:    CONSTITUTIONAL: No fever, weight loss, or fatigue  EYES: No eye pain, visual disturbances, or discharge  ENMT:  No difficulty hearing, tinnitus, vertigo; No sinus or throat pain  NECK: No pain or stiffness  BREASTS: No pain, masses, or nipple discharge  RESPIRATORY: No cough, wheezing, chills or hemoptysis; No shortness of breath  CARDIOVASCULAR: No chest pain, palpitations, dizziness, or leg swelling  GASTROINTESTINAL: No abdominal or epigastric pain. No nausea, vomiting, or hematemesis; No diarrhea or constipation. No melena or hematochezia.  GENITOURINARY: No dysuria, frequency, hematuria, or incontinence  NEUROLOGICAL: No headaches, memory loss, loss of strength, numbness, or tremors  SKIN: No itching, burning, rashes, or lesions   LYMPH NODES: No enlarged glands  ENDOCRINE: No heat or cold intolerance; No hair loss  MUSCULOSKELETAL: No joint pain or swelling; No muscle, back, or extremity pain  PSYCHIATRIC: No depression, anxiety, mood swings, or difficulty sleeping  HEME/LYMPH: No easy bruising, or bleeding gums  ALLERGY AND IMMUNOLOGIC: No hives or eczema    MEDICATIONS  (STANDING):  aspirin enteric coated 81 milliGRAM(s) Oral daily  atorvastatin 40 milliGRAM(s) Oral at bedtime  calcium acetate 667 milliGRAM(s) Oral three times a day with meals  cloNIDine 0.1 milliGRAM(s) Oral two times a day  dextrose 5%. 1000 milliLiter(s) (50 mL/Hr) IV Continuous <Continuous>  dextrose 5%. 1000 milliLiter(s) (100 mL/Hr) IV Continuous <Continuous>  dextrose 50% Injectable 25 Gram(s) IV Push once  dextrose 50% Injectable 12.5 Gram(s) IV Push once  dextrose 50% Injectable 25 Gram(s) IV Push once  epoetin fawad-epbx (RETACRIT) Injectable 51865 Unit(s) IV Push <User Schedule>  glucagon  Injectable 1 milliGRAM(s) IntraMuscular once  heparin   Injectable 5000 Unit(s) SubCutaneous every 12 hours  imipramine 50 milliGRAM(s) Oral daily  insulin glargine Injectable (LANTUS) 12 Unit(s) SubCutaneous at bedtime  insulin lispro (ADMELOG) corrective regimen sliding scale   SubCutaneous three times a day before meals  lactobacillus acidophilus 1 Tablet(s) Oral three times a day  metoprolol tartrate 100 milliGRAM(s) Oral every 12 hours  multivitamin 1 Tablet(s) Oral daily  nystatin Powder 1 Application(s) Topical two times a day  pantoprazole    Tablet 40 milliGRAM(s) Oral before breakfast  risperiDONE   Tablet 0.5 milliGRAM(s) Oral two times a day  senna 2 Tablet(s) Oral at bedtime  zinc sulfate 220 milliGRAM(s) Oral daily    MEDICATIONS  (PRN):  acetaminophen     Tablet .. 650 milliGRAM(s) Oral every 6 hours PRN Temp greater or equal to 38C (100.4F), Mild Pain (1 - 3)  aluminum hydroxide/magnesium hydroxide/simethicone Suspension 30 milliLiter(s) Oral every 4 hours PRN Dyspepsia  bisacodyl Suppository 10 milliGRAM(s) Rectal daily PRN Constipation  dextrose Oral Gel 15 Gram(s) Oral once PRN Blood Glucose LESS THAN 70 milliGRAM(s)/deciliter  melatonin 3 milliGRAM(s) Oral at bedtime PRN Insomnia  ondansetron Injectable 4 milliGRAM(s) IV Push every 8 hours PRN Nausea and/or Vomiting  traMADol 50 milliGRAM(s) Oral three times a day PRN Moderate Pain (4 - 6)      ALLERGIES: Drug Allergies Not Recorded  latex (Hives)  latex (Unknown)  No Known Drug Allergies      FAMILY HISTORY:      PHYSICAL EXAMINATION:  -----------------------------  T(C): 37.1 (09-17-22 @ 05:45), Max: 37.3 (09-17-22 @ 05:24)  HR: 68 (09-17-22 @ 05:45) (68 - 104)  BP: 151/67 (09-17-22 @ 05:45) (106/40 - 151/67)  RR: 16 (09-17-22 @ 05:45) (16 - 18)  SpO2: 96% (09-17-22 @ 05:45) (93% - 100%)  Wt(kg): --    09-16 @ 07:01  -  09-17 @ 07:00  --------------------------------------------------------  IN:  Total IN: 0 mL    OUT:    Other (mL): 1500 mL  Total OUT: 1500 mL    Total NET: -1500 mL            VITALS  T(C): 37.1 (09-17-22 @ 05:45), Max: 37.3 (09-17-22 @ 05:24)  HR: 68 (09-17-22 @ 05:45) (68 - 104)  BP: 151/67 (09-17-22 @ 05:45) (106/40 - 151/67)  RR: 16 (09-17-22 @ 05:45) (16 - 18)  SpO2: 96% (09-17-22 @ 05:45) (93% - 100%)    Constitutional: well developed, normal appearance, well groomed, well nourished, no deformities and no acute distress.   Eyes: the conjunctiva exhibited no abnormalities and the eyelids demonstrated no xanthelasmas.   HEENT: normal oral mucosa, no oral pallor and no oral cyanosis.   Neck: normal jugular venous A waves present, normal jugular venous V waves present and no jugular venous wilson A waves.   Pulmonary: no respiratory distress, normal respiratory rhythm and effort, no accessory muscle use and lungs were clear to auscultation bilaterally.   Cardiovascular: heart rate and rhythm were normal, normal S1 and S2 and no murmur, gallop, rub, heave or thrill are present.   Abdomen: soft, non-tender, no hepato-splenomegaly and no abdominal mass palpated.   Musculoskeletal: the gait could not be assessed..   Extremities: no clubbing of the fingernails, no localized cyanosis, no petechial hemorrhages and no ischemic changes.   Skin: normal skin color and pigmentation, no rash, no venous stasis, no skin lesions, no skin ulcer and no xanthoma was observed.   Psychiatric: oriented to person, place, and time, the affect was normal, the mood was normal and not feeling anxious.     LABS:   --------  09-17    138  |  99  |  46<H>  ----------------------------<  196<H>  4.8   |  34<H>  |  4.70<H>    Ca    9.6      17 Sep 2022 08:23  Phos  4.0     09-16    TPro  x   /  Alb  2.6<L>  /  TBili  x   /  DBili  x   /  AST  x   /  ALT  x   /  AlkPhos  x   09-16                         9.5    9.87  )-----------( 264      ( 17 Sep 2022 08:23 )             29.2                 RADIOLOGY:  -----------------    ECG:     ECHO:

## 2022-09-17 NOTE — DISCHARGE NOTE PROVIDER - NSDCFUADDINST_GEN_ALL_CORE_FT
Wound Care Instructions:  -Keep dressing clean, dry, and intact to both foot  - Clean wounds with normal saline. Milly it dry with gauze. Change the dressing with silver alginate to bilateral foot wound every other day  -Patient must remain Partial weight bearing as tolerated to the left heel in surgical shoe, full weight bearing to the right foot  -Monitor for any signs of infection including redness, swelling in the leg above the bandage, nausea/vomiting/fever/chills/chest pain/shortness of breath, if any are present patient must report to the emergency department immediately  -Patient is to follow up with  Dr. Duffy On Wednesday 9/22 after discharge at [facility] , please call before coming [phone].

## 2022-09-17 NOTE — PROGRESS NOTE ADULT - SUBJECTIVE AND OBJECTIVE BOX
71y year old Female seen at [facility] TELE 317 W1 for right foot surgical site wound s/p hallux and first metatarsal head resection (DOS 06/23/22) and left medial ankle wound. Patient was resting in bed comfortably without any distress. Patient was awake and denied any pain to the foot wounds. Denies any fever, chills, nausea, vomiting, chest pain, shortness of breath, or calf pain at this time.    Allergies    Drug Allergies Not Recorded  latex (Hives)  latex (Unknown)  No Known Drug Allergies    Intolerances        MEDICATIONS  (STANDING):  aspirin enteric coated 81 milliGRAM(s) Oral daily  atorvastatin 40 milliGRAM(s) Oral at bedtime  calcium acetate 667 milliGRAM(s) Oral three times a day with meals  cloNIDine 0.1 milliGRAM(s) Oral two times a day  dextrose 5%. 1000 milliLiter(s) (50 mL/Hr) IV Continuous <Continuous>  dextrose 5%. 1000 milliLiter(s) (100 mL/Hr) IV Continuous <Continuous>  dextrose 50% Injectable 25 Gram(s) IV Push once  dextrose 50% Injectable 12.5 Gram(s) IV Push once  dextrose 50% Injectable 25 Gram(s) IV Push once  epoetin fawad-epbx (RETACRIT) Injectable 40217 Unit(s) IV Push <User Schedule>  glucagon  Injectable 1 milliGRAM(s) IntraMuscular once  heparin   Injectable 5000 Unit(s) SubCutaneous every 12 hours  imipramine 50 milliGRAM(s) Oral daily  insulin glargine Injectable (LANTUS) 8 Unit(s) SubCutaneous at bedtime  insulin lispro (ADMELOG) corrective regimen sliding scale   SubCutaneous three times a day before meals  lactobacillus acidophilus 1 Tablet(s) Oral three times a day  metoprolol tartrate 100 milliGRAM(s) Oral every 12 hours  multivitamin 1 Tablet(s) Oral daily  nystatin Powder 1 Application(s) Topical two times a day  pantoprazole    Tablet 40 milliGRAM(s) Oral before breakfast  risperiDONE   Tablet 0.5 milliGRAM(s) Oral two times a day  senna 2 Tablet(s) Oral at bedtime  zinc sulfate 220 milliGRAM(s) Oral daily    MEDICATIONS  (PRN):  acetaminophen     Tablet .. 650 milliGRAM(s) Oral every 6 hours PRN Temp greater or equal to 38C (100.4F), Mild Pain (1 - 3)  aluminum hydroxide/magnesium hydroxide/simethicone Suspension 30 milliLiter(s) Oral every 4 hours PRN Dyspepsia  bisacodyl Suppository 10 milliGRAM(s) Rectal daily PRN Constipation  dextrose Oral Gel 15 Gram(s) Oral once PRN Blood Glucose LESS THAN 70 milliGRAM(s)/deciliter  melatonin 3 milliGRAM(s) Oral at bedtime PRN Insomnia  ondansetron Injectable 4 milliGRAM(s) IV Push every 8 hours PRN Nausea and/or Vomiting  traMADol 50 milliGRAM(s) Oral three times a day PRN Moderate Pain (4 - 6)      Vital Signs Last 24 Hrs  T(C): 37.1 (17 Sep 2022 05:45), Max: 37.3 (17 Sep 2022 05:24)  T(F): 98.8 (17 Sep 2022 05:45), Max: 99.1 (17 Sep 2022 05:24)  HR: 68 (17 Sep 2022 05:45) (68 - 104)  BP: 151/67 (17 Sep 2022 05:45) (106/40 - 151/67)  BP(mean): --  RR: 16 (17 Sep 2022 05:45) (16 - 18)  SpO2: 96% (17 Sep 2022 05:45) (93% - 100%)    Parameters below as of 17 Sep 2022 05:45  Patient On (Oxygen Delivery Method): room air                PHYSICAL EXAM:  Vascular: DP & PT Non palpable bilaterally, Capillary refill 3 seconds   Neurological: Light touch sensation diminished bilaterally  Musculoskeletal: 4/5 strength in all quadrants bilaterally, AJ & STJ ROM intact  Dermatological : Left foot: 2.0 x1.3x 0.9 cm ulceration noted to the medial left ankle, fibrotic wound bed, probe to bone, mild periwound erythema, mild fluctuance, no malodor, no purulence,  no proximal streaking at this time  Right wound: Open wound at the stump of the at the partial first resection  surgical site , 3.0 x1,3x 1.2 cm, fibronecrotic base, probe to bone, mild periwound erythema, no fluctuance, no malodor, no purulence   Right upper leg wound 1x1 cm granular base.                                                   9.5    9.87  )-----------( 264      ( 17 Sep 2022 08:23 )             29.2   09-17    138  |  99  |  46<H>  ----------------------------<  196<H>  4.8   |  34<H>  |  4.70<H>    Ca    9.6      17 Sep 2022 08:23  Phos  4.0     09-16    TPro  x   /  Alb  2.6<L>  /  TBili  x   /  DBili  x   /  AST  x   /  ALT  x   /  AlkPhos  x   09-16                    Imaging: ----------    ACC: [id number] EXAM: XR FOOT COMP MIN 3 VIEWS RT  ACC: [id number] EXAM: XR CHEST AP OR PA 1V    PROCEDURE DATE: 07/14/2022        INTERPRETATION: Chest and right foot. Patient has a wound around the great toe.    AP semierect chest on July 14, 2022 at 4:52 PM.    Heart magnified by technique.    On June 30 there were mild lower lung field effusions.    Presently lungs are clear.    Right foot. 3 views.    Indication of the distal first metatarsal again noted.    There appears to be some soft tissue ulcer of the distal soft tissues increased from June 23. Arterial calcifications are noted.    IMPRESSION: Soft tissue ulcer at the amputation site around the right first metatarsal. Clear lungs at this time.    --- End of Report ---    < from: MR Ankle No Cont, Left (09.15.22 @ 14:54) >  CC: [id number] EXAM:  MR ANKLE LT                          PROCEDURE DATE:  09/15/2022          INTERPRETATION:  EXAMINATION: MR ANKLE LEFT    CLINICAL INDICATION:Left ankle nonhealing wound    COMPARISON: Left ankle radiographs dated 4/22/2022 and MRI dated 4/8/2022    TECHNIQUE: Multiplanar, multi-sequence MRI of the left ankle was   performed without intravenous contrast.    INTERPRETATION:    Localizer: No additional findings.    Joint space: There are no tibiotalar or subtalar joint effusions.    Bones and articular cartilage: Since the previous MRI, the medial   malleolar edema has resolved, consistent with resolution of previous   osteomyelitis. There is no new MRI evidence for osteomyelitis on the   current exam.    Tendons and bursae: There is mild Achilles insertional enthesopathy. The   flexor, extensor, peroneal, and Achilles tendons are intact.  There is no   abnormal bursal distension.    Ligaments: The tibiofibular, talofibular, calcaneofibular, deltoid, and   spring ligaments are intact.    Plantar fascia: There is mild thickening and intermediate T2   hyperintensity of the medial cord of the plantar fascia with mild   adjacent, findings consistent with planter fasciitis.There is a plantar   spur.    Other: There is skin ulceration overlying the medial malleolus with   subcutaneous edema and T1 confluent hypointensity and STIR   hyperintensity. There is mild edema within the intrinsic muscles of the   foot which may be seen in neuropathy.    IMPRESSION:  1. Skin thickening with localized soft tissue wound at the medial     < end of copied text >  < from: MR Foot No Cont, Right (09.15.22 @ 14:34) >    ACC: [id number] EXAM:  MR FOOT RT                          PROCEDURE DATE:  09/15/2022          INTERPRETATION:  EXAMINATION: MR FOOT RIGHT    CLINICAL INDICATION:Non-healing surgical wound    COMPARISON: Right foot radiographs dated 7/14/2022 and 6/23/2022 and   additional priors including MRI of 4/8/2022    TECHNIQUE: Multiplanar, multi-sequence MRI of the right foot was   performed without intravenous contrast.    INTERPRETATION:    BONES: There has been prior amputation of the first ray at the level of   the distal metatarsal shaft. At the distalmost lateral aspect of the   amputation stump there is a 1 cm focus of confluent T1 hypointense/STIR   hyperintense signal abnormality consistent with osteomyelitis.    There is no acute fracture.There are scattered mild/moderate   interphalangeal joint degenerative changes. There are no erosive changes   in the midfoot.    SOFT TISSUES: At the first metatarsal amputation site, there is skin   thickening and a deep soft tissue wound which tracks to the skin with   thickened T2 hyperintense, enhancing periphery, with a central   hypointense tract. The wound measures approximately 2.4 cm deep,   extending to the level of bone. There is no drainable abscess.    There is no acute ligament or tendon injury. Lisfranc ligament is intact.    IMPRESSION:  Deep soft tissue wound at the site of prior transmetatarsal amputation of   the first ray, with approximately 1 cm focus of osteomyelitis of the   distal first metatarsal stump, as detailed above.    --- End of Report ---              < end of copied text >

## 2022-09-17 NOTE — DISCHARGE NOTE PROVIDER - CARE PROVIDER_API CALL
Faisal Pastor (DPM)  Foot Surgery; Podiatric Medicine; Recon RearfootAnkle Surgery  [address] [place] [address]  Phone: [phone]  Fax: [phone]  Follow Up Time: 1 week

## 2022-09-17 NOTE — DISCHARGE NOTE PROVIDER - NSDCFUADDAPPT_GEN_ALL_CORE_FT
PLEASE ORDER IN HOUSE PODIATRY ,WOUND CARE CONSULT UPON READMISSION TO [facility]  1)PLEASE ORDER IN HOUSE PODIATRY ,WOUND CARE CONSULT UPON READMISSION TO [facility] 2) PLEASE ORDER EKG WEEKLY FOR 6 WEEKS WHILE ON LEVAQUIN TO MONITOR QT INTERVAL

## 2022-09-17 NOTE — PROGRESS NOTE ADULT - ASSESSMENT
Patient was brought to [place] ED from [facility] for fall today.  As per pt, pt had to go to bathroom, but wheel chair was unreachable, so pt ambulated to bathroom, had one diarrheal episode , got weaker, and slid to the floor. pt denies any pain in the ED.  No head trauma, headache or neck pain.  pt is dnr .Recent admission 07/14/2022  -71 year old female with PMH of Afib (not on AC for hx of multiple falls), T2DM, HTN, HLD, ESRD on HD (MWF), CHF, CAD s/p CABG, PAD S/P R-->L bifem-fem BK pop bypass, recent fempop bypass (6/21/2022), and partial ray amputation right great toe (6/22/2022) Pathology Final Diagnosis  06/23/22 1. Right hallux -Acute and chronic osteomyelitis. -Gangrenous skin and soft tissue. 2. Right first metatarsal/clean margin: -Minimal chronic osteomyelitis. Was on Unasyn to complete 6 weeks on 7/26 - Wound culture with Klebsiella, enterobacter, and also Stenotrophomonas but unclear if real infection .Followed  by ID at [facility]   - Vascular surgery and podiatry follow up requested since patient was supposed to be scheduled for followup within next 1-2 weeks    (12 Sep 2022 19:25)    esrd on hd   Excess fluids and waste products will be removed from your blood; your electrolytes will be balanced; your blood pressure will be controlled.    ANEMIA PLAN:  Anemia of chronic disease:  Well controlled by Aranesp  H and H subtherapeutic .  We will continue Aranesp aiming for a HCT of 32-36 %.   We will monitor Iron stores, B12 and RBC folate .     BP monitoring,continue current antihypertensive meds, low salt diet,followup with PMD in 1-2 weeks      
Patient was brought to [place] ED from [facility] for fall today.  As per pt, pt had to go to bathroom, but wheel chair was unreachable, so pt ambulated to bathroom, had one diarrheal episode , got weaker, and slid to the floor. pt denies any pain in the ED.  No head trauma, headache or neck pain.  pt is dnr .Recent admission 07/14/2022  -71 year old female with PMH of Afib (not on AC for hx of multiple falls), T2DM, HTN, HLD, ESRD on HD (MWF), CHF, CAD s/p CABG, PAD S/P R-->L bifem-fem BK pop bypass, recent fempop bypass (6/21/2022), and partial ray amputation right great toe (6/22/2022) Pathology Final Diagnosis  06/23/22 1. Right hallux -Acute and chronic osteomyelitis. -Gangrenous skin and soft tissue. 2. Right first metatarsal/clean margin: -Minimal chronic osteomyelitis. Was on Unasyn to complete 6 weeks on 7/26 - Wound culture with Klebsiella, enterobacter, and also Stenotrophomonas but unclear if real infection .Followed  by ID at [facility]   - Vascular surgery and podiatry follow up requested since patient was supposed to be scheduled for followup within next 1-2 weeks    (12 Sep 2022 19:25)    esrd on hd   Excess fluids and waste products will be removed from your blood; your electrolytes will be balanced; your blood pressure will be controlled.    ANEMIA PLAN:  Anemia of chronic disease:  Well controlled by Aranesp  H and H subtherapeutic .  We will continue Aranesp aiming for a HCT of 32-36 %.   We will monitor Iron stores, B12 and RBC folate .     BP monitoring,continue current antihypertensive meds, low salt diet,followup with PMD in 1-2 weeks      
admiited with fall and weakness  esrd - on hd  ashd s/p cabg  s/p coronary stent  hypertension  dm2  dyslipidemia  pvd-s/p bypass   s/p amputation of rt great toe for osteomyelitis
Patient was brought to [place] ED from [facility] for fall today.  As per pt, pt had to go to bathroom, but wheel chair was unreachable, so pt ambulated to bathroom, had one diarrheal episode , got weaker, and slid to the floor. pt denies any pain in the ED.  No head trauma, headache or neck pain.  pt is dnr .Recent admission 07/14/2022  -71 year old female with PMH of Afib (not on AC for hx of multiple falls), T2DM, HTN, HLD, ESRD on HD (MWF), CHF, CAD s/p CABG, PAD S/P R-->L bifem-fem BK pop bypass, recent fempop bypass (6/21/2022), and partial ray amputation right great toe (6/22/2022) Pathology Final Diagnosis  06/23/22 1. Right hallux -Acute and chronic osteomyelitis. -Gangrenous skin and soft tissue. 2. Right first metatarsal/clean margin: -Minimal chronic osteomyelitis. Was on Unasyn to complete 6 weeks on 7/26 - Wound culture with Klebsiella, enterobacter, and also Stenotrophomonas but unclear if real infection .Followed  by ID at [facility]   - Vascular surgery and podiatry follow up requested since patient was supposed to be scheduled for followup within next 1-2 weeks    (12 Sep 2022 19:25)    esrd on hd   Excess fluids and waste products will be removed from your blood; your electrolytes will be balanced; your blood pressure will be controlled.    ANEMIA PLAN:  Anemia of chronic disease:  Well controlled by Aranesp  H and H subtherapeutic .  We will continue Aranesp aiming for a HCT of 32-36 %.   We will monitor Iron stores, B12 and RBC folate .     BP monitoring,continue current antihypertensive meds, low salt diet,followup with PMD in 1-2 weeks      
admiited with fall and weakness  esrd - on hd  ashd s/p cabg  s/p coronary stent  hypertension  dm2  dyslipidemia  pvd-s/p bypass   s/p amputation of rt great toe for osteomyelitis
admiited with fall and weakness  esrd - on hd  ashd s/p cabg  s/p coronary stent  hypertension  dm2  dyslipidemia  pvd-s/p bypass   s/p amputation of rt great toe for osteomyelitis
left ankle wound   right post op open wound at the stump of the hallux  Right foot OM
Patient was brought to [place] ED from [facility] for fall today.  As per pt, pt had to go to bathroom, but wheel chair was unreachable, so pt ambulated to bathroom, had one diarrheal episode , got weaker, and slid to the floor. pt denies any pain in the ED.  No head trauma, headache or neck pain.  pt is dnr .Recent admission 07/14/2022  -71 year old female with PMH of Afib (not on AC for hx of multiple falls), T2DM, HTN, HLD, ESRD on HD (MWF), CHF, CAD s/p CABG, PAD S/P R-->L bifem-fem BK pop bypass, recent fempop bypass (6/21/2022), and partial ray amputation right great toe (6/22/2022) Pathology Final Diagnosis  06/23/22 1. Right hallux -Acute and chronic osteomyelitis. -Gangrenous skin and soft tissue. 2. Right first metatarsal/clean margin: -Minimal chronic osteomyelitis. Was on Unasyn to complete 6 weeks on 7/26 - Wound culture with Klebsiella, enterobacter, and also Stenotrophomonas but unclear if real infection .Followed  by ID at [facility]   - Vascular surgery and podiatry follow up requested since patient was supposed to be scheduled for followup within next 1-2 weeks    (12 Sep 2022 19:25)    esrd on hd   Excess fluids and waste products will be removed from your blood; your electrolytes will be balanced; your blood pressure will be controlled.    ANEMIA PLAN:  Anemia of chronic disease:  Well controlled by Aranesp  H and H subtherapeutic .  We will continue Aranesp aiming for a HCT of 32-36 %.   We will monitor Iron stores, B12 and RBC folate .     BP monitoring,continue current antihypertensive meds, low salt diet,followup with PMD in 1-2 weeks      
admiited with fall and weakness  esrd - on hd  ashd s/p cabg  s/p coronary stent  hypertension  dm2  dyslipidemia  pvd-s/p bypass   s/p amputation of rt great toe for osteomyelitis
Patient was brought to [place] ED from [facility] for fall today.  As per pt, pt had to go to bathroom, but wheel chair was unreachable, so pt ambulated to bathroom, had one diarrheal episode , got weaker, and slid to the floor. pt denies any pain in the ED.  No head trauma, headache or neck pain.  pt is dnr .Recent admission 07/14/2022  -71 year old female with PMH of Afib (not on AC for hx of multiple falls), T2DM, HTN, HLD, ESRD on HD (MWF), CHF, CAD s/p CABG, PAD S/P R-->L bifem-fem BK pop bypass, recent fempop bypass (6/21/2022), and partial ray amputation right great toe (6/22/2022) Pathology Final Diagnosis  06/23/22 1. Right hallux -Acute and chronic osteomyelitis. -Gangrenous skin and soft tissue. 2. Right first metatarsal/clean margin: -Minimal chronic osteomyelitis. Was on Unasyn to complete 6 weeks on 7/26 - Wound culture with Klebsiella, enterobacter, and also Stenotrophomonas but unclear if real infection .Followed  by ID at [facility]   - Vascular surgery and podiatry follow up requested since patient was supposed to be scheduled for followup within next 1-2 weeks    (12 Sep 2022 19:25)    esrd on hd   Excess fluids and waste products will be removed from your blood; your electrolytes will be balanced; your blood pressure will be controlled.    ANEMIA PLAN:  Anemia of chronic disease:  Well controlled by Aranesp  H and H subtherapeutic .  We will continue Aranesp aiming for a HCT of 32-36 %.   We will monitor Iron stores, B12 and RBC folate .     BP monitoring,continue current antihypertensive meds, low salt diet,followup with PMD in 1-2 weeks      
admiited with fall and weakness  esrd - on hd  ashd s/p cabg  s/p coronary stent  hypertension  dm2  dyslipidemia  pvd-s/p bypass   s/p amputation of rt great toe for osteomyelitis
left ankle wound   right post op open wound at the stump of the hallux  Right foot OM
Patient was brought to [place] ED from [facility] for fall today.  As per pt, pt had to go to bathroom, but wheel chair was unreachable, so pt ambulated to bathroom, had one diarrheal episode , got weaker, and slid to the floor. pt denies any pain in the ED.  No head trauma, headache or neck pain.  pt is dnr .Recent admission 07/14/2022  -71 year old female with PMH of Afib (not on AC for hx of multiple falls), T2DM, HTN, HLD, ESRD on HD (MWF), CHF, CAD s/p CABG, PAD S/P R-->L bifem-fem BK pop bypass, recent fempop bypass (6/21/2022), and partial ray amputation right great toe (6/22/2022) Pathology Final Diagnosis  06/23/22 1. Right hallux -Acute and chronic osteomyelitis. -Gangrenous skin and soft tissue. 2. Right first metatarsal/clean margin: -Minimal chronic osteomyelitis. Was on Unasyn to complete 6 weeks on 7/26 - Wound culture with Klebsiella, enterobacter, and also Stenotrophomonas but unclear if real infection .Followed  by ID at [facility]   - Vascular surgery and podiatry follow up requested since patient was supposed to be scheduled for followup within next 1-2 weeks   
Patient was brought to [place] ED from [facility] for fall today.  As per pt, pt had to go to bathroom, but wheel chair was unreachable, so pt ambulated to bathroom, had one diarrheal episode , got weaker, and slid to the floor. pt denies any pain in the ED.  No head trauma, headache or neck pain.  pt is dnr .Recent admission 07/14/2022  -71 year old female with PMH of Afib (not on AC for hx of multiple falls), T2DM, HTN, HLD, ESRD on HD (MWF), CHF, CAD s/p CABG, PAD S/P R-->L bifem-fem BK pop bypass, recent fempop bypass (6/21/2022), and partial ray amputation right great toe (6/22/2022) Pathology Final Diagnosis  06/23/22 1. Right hallux -Acute and chronic osteomyelitis. -Gangrenous skin and soft tissue. 2. Right first metatarsal/clean margin: -Minimal chronic osteomyelitis. Was on Unasyn to complete 6 weeks on 7/26 - Wound culture with Klebsiella, enterobacter, and also Stenotrophomonas but unclear if real infection .Followed  by ID at [facility]   - Vascular surgery and podiatry follow up requested since patient was supposed to be scheduled for followup within next 1-2 weeks   
Patient was brought to [place] ED from [facility] for fall today.  As per pt, pt had to go to bathroom, but wheel chair was unreachable, so pt ambulated to bathroom, had one diarrheal episode , got weaker, and slid to the floor. pt denies any pain in the ED.  No head trauma, headache or neck pain.  pt is dnr .Recent admission 07/14/2022  -71 year old female with PMH of Afib (not on AC for hx of multiple falls), T2DM, HTN, HLD, ESRD on HD (MWF), CHF, CAD s/p CABG, PAD S/P R-->L bifem-fem BK pop bypass, recent fempop bypass (6/21/2022), and partial ray amputation right great toe (6/22/2022) Pathology Final Diagnosis  06/23/22 1. Right hallux -Acute and chronic osteomyelitis. -Gangrenous skin and soft tissue. 2. Right first metatarsal/clean margin: -Minimal chronic osteomyelitis. Was on Unasyn to complete 6 weeks on 7/26 - Wound culture with Klebsiella, enterobacter, and also Stenotrophomonas but unclear if real infection .Followed  by ID at [facility]   - Vascular surgery and podiatry follow up requested since patient was supposed to be scheduled for followup within next 1-2 weeks   
left ankle wound   right post op open wound at the stump of the hallux  possible OM
Patient was brought to [place] ED from [facility] for fall today.  As per pt, pt had to go to bathroom, but wheel chair was unreachable, so pt ambulated to bathroom, had one diarrheal episode , got weaker, and slid to the floor. pt denies any pain in the ED.  No head trauma, headache or neck pain.  pt is dnr .Recent admission 07/14/2022  -71 year old female with PMH of Afib (not on AC for hx of multiple falls), T2DM, HTN, HLD, ESRD on HD (MWF), CHF, CAD s/p CABG, PAD S/P R-->L bifem-fem BK pop bypass, recent fempop bypass (6/21/2022), and partial ray amputation right great toe (6/22/2022) Pathology Final Diagnosis  06/23/22 1. Right hallux -Acute and chronic osteomyelitis. -Gangrenous skin and soft tissue. 2. Right first metatarsal/clean margin: -Minimal chronic osteomyelitis. Was on Unasyn to complete 6 weeks on 7/26 - Wound culture with Klebsiella, enterobacter, and also Stenotrophomonas but unclear if real infection .Followed  by ID at [facility]   - Vascular surgery and podiatry follow up requested since patient was supposed to be scheduled for followup within next 1-2 weeks   
Patient was brought to [place] ED from [facility] for fall today.  As per pt, pt had to go to bathroom, but wheel chair was unreachable, so pt ambulated to bathroom, had one diarrheal episode , got weaker, and slid to the floor. pt denies any pain in the ED.  No head trauma, headache or neck pain.  pt is dnr .Recent admission 07/14/2022  -71 year old female with PMH of Afib (not on AC for hx of multiple falls), T2DM, HTN, HLD, ESRD on HD (MWF), CHF, CAD s/p CABG, PAD S/P R-->L bifem-fem BK pop bypass, recent fempop bypass (6/21/2022), and partial ray amputation right great toe (6/22/2022) Pathology Final Diagnosis  06/23/22 1. Right hallux -Acute and chronic osteomyelitis. -Gangrenous skin and soft tissue. 2. Right first metatarsal/clean margin: -Minimal chronic osteomyelitis. Was on Unasyn to complete 6 weeks on 7/26 - Wound culture with Klebsiella, enterobacter, and also Stenotrophomonas but unclear if real infection .Followed  by ID at [facility]   - Vascular surgery and podiatry follow up requested since patient was supposed to be scheduled for followup within next 1-2 weeks

## 2022-09-17 NOTE — DISCHARGE NOTE PROVIDER - NSDCCAREPROVSEEN_GEN_ALL_CORE_FT
Jacob, Joseluis Duffy, Nancy Pastor, Faisal Long, Geovany Gutiérrez, Jamaal Wallace, Andrew Perlman, Trevin Larson, Elena A Weil, Roxana

## 2022-09-17 NOTE — DISCHARGE NOTE NURSING/CASE MANAGEMENT/SOCIAL WORK - NSDCPEFALRISK_GEN_ALL_CORE
For information on Fall & Injury Prevention, visit: [email or web address] OR  [email or web address] OR  https://www.cdc.gov/steadi/patient.html

## 2022-09-17 NOTE — PROGRESS NOTE ADULT - PROBLEM SELECTOR PROBLEM 3
Acute on chronic osteomyelitis

## 2022-09-17 NOTE — PROGRESS NOTE ADULT - PROBLEM SELECTOR PLAN 5
Seen by vascular  team - Arterial duplex reviewed  RLE bypass graft open  No further vascular surgery testing/intervention needed

## 2022-09-17 NOTE — DISCHARGE NOTE NURSING/CASE MANAGEMENT/SOCIAL WORK - NSDCVIVACCINE_GEN_ALL_CORE_FT
Tdap; 27-Nov-2015 19:03; Bob Kahn (LEV); [employer]; [id number]; IntraMuscular; Deltoid Left.; 0.5 milliLiter(s); VIS (VIS Published: 09-May-2013, VIS Presented: 27-Nov-2015);

## 2022-09-17 NOTE — PROGRESS NOTE ADULT - PROBLEM SELECTOR PROBLEM 1
DM (diabetes mellitus)
Infected open wound
Fall
Infected open wound
Infected open wound

## 2022-09-17 NOTE — DISCHARGE NOTE NURSING/CASE MANAGEMENT/SOCIAL WORK - PATIENT PORTAL LINK FT
You can access the FollowMyHealth Patient Portal offered by [facility] by registering at the following website: [email or web address]. By joining [facility]’s FollowMyHealth portal, you will also be able to view your health information using other applications (apps) compatible with our system.

## 2022-09-21 ENCOUNTER — APPOINTMENT (OUTPATIENT)
Dept: VASCULAR SURGERY | Facility: CLINIC | Age: 72
End: 2022-09-21

## 2022-10-31 NOTE — DISCHARGE NOTE PROVIDER - NSDCCPCAREPLAN_GEN_ALL_CORE_FT
PRINCIPAL DISCHARGE DIAGNOSIS  Diagnosis: Cellulitis of right foot  Assessment and Plan of Treatment:       SECONDARY DISCHARGE DIAGNOSES  Diagnosis: DM (diabetes mellitus)  Assessment and Plan of Treatment:     Diagnosis: HTN (hypertension)  Assessment and Plan of Treatment:     Diagnosis: ESRD on hemodialysis  Assessment and Plan of Treatment:     Diagnosis: Acute on chronic osteomyelitis  Assessment and Plan of Treatment:     
No

## 2022-11-07 NOTE — PATIENT PROFILE ADULT - OVER THE PAST TWO WEEKS, HAVE YOU FELT LITTLE INTEREST OR PLEASURE IN DOING THINGS?
For information on Fall & Injury Prevention, visit: [email or web address] OR  [email or web address] OR  https://www.cdc.gov/steadi/patient.html
no

## 2022-11-16 ENCOUNTER — NON-APPOINTMENT (OUTPATIENT)
Age: 72
End: 2022-11-16

## 2022-11-16 DIAGNOSIS — Z82.49 FAMILY HISTORY OF ISCHEMIC HEART DISEASE AND OTHER DISEASES OF THE CIRCULATORY SYSTEM: ICD-10-CM

## 2022-11-16 DIAGNOSIS — N18.6 END STAGE RENAL DISEASE: ICD-10-CM

## 2022-11-16 DIAGNOSIS — Z92.89 PERSONAL HISTORY OF OTHER MEDICAL TREATMENT: ICD-10-CM

## 2022-11-16 DIAGNOSIS — I25.2 OLD MYOCARDIAL INFARCTION: ICD-10-CM

## 2022-11-16 DIAGNOSIS — Z86.79 PERSONAL HISTORY OF OTHER DISEASES OF THE CIRCULATORY SYSTEM: ICD-10-CM

## 2022-11-16 RX ORDER — INSULIN ASPART INJECTION 100 [IU]/ML
INJECTION, SOLUTION SUBCUTANEOUS
Refills: 0 | Status: ACTIVE | COMMUNITY

## 2022-11-16 RX ORDER — EPOETIN ALFA 10000 [IU]/ML
10000 SOLUTION INTRAVENOUS; SUBCUTANEOUS
Refills: 0 | Status: ACTIVE | COMMUNITY

## 2022-11-16 RX ORDER — ACETAMINOPHEN 325 MG/1
325 TABLET ORAL EVERY 6 HOURS
Refills: 0 | Status: ACTIVE | COMMUNITY

## 2022-11-16 RX ORDER — ATORVASTATIN CALCIUM 10 MG/1
10 TABLET, FILM COATED ORAL
Refills: 0 | Status: ACTIVE | COMMUNITY

## 2022-11-16 RX ORDER — VIT B COMP NO.3/FOLIC/C/BIOTIN 1 MG-60 MG
TABLET ORAL DAILY
Refills: 0 | Status: ACTIVE | COMMUNITY

## 2022-11-16 RX ORDER — CALCIUM ACETATE 668 MG
TABLET ORAL 3 TIMES DAILY
Refills: 0 | Status: ACTIVE | COMMUNITY

## 2022-11-16 RX ORDER — INSULIN DEGLUDEC INJECTION 100 U/ML
INJECTION, SOLUTION SUBCUTANEOUS
Refills: 0 | Status: ACTIVE | COMMUNITY

## 2022-11-16 RX ORDER — TAMSULOSIN HYDROCHLORIDE 0.4 MG/1
0.4 CAPSULE ORAL
Refills: 0 | Status: ACTIVE | COMMUNITY

## 2022-11-16 RX ORDER — ENALAPRIL MALEATE 20 MG/1
20 TABLET ORAL DAILY
Refills: 0 | Status: ACTIVE | COMMUNITY

## 2022-11-19 NOTE — ED ADULT TRIAGE NOTE - HEIGHT IN FEET
GENERAL: NAD, lying in bed comfortably  HEAD:  Atraumatic, Normocephalic  CHEST/LUNG:No rales, rhonchi, wheezing, or rubs. Unlabored respirations  HEART: Regular rate and rhythm  ABDOMEN: Soft, Nontender, Nondistended  EXTREMITIES:  Right lower extremity 3+ pitting edema, erythema, tenderness  NERVOUS SYSTEM:  Alert & Oriented X3, speech clear. No deficits 5

## 2022-11-21 ENCOUNTER — NON-APPOINTMENT (OUTPATIENT)
Age: 72
End: 2022-11-21

## 2022-11-22 ENCOUNTER — APPOINTMENT (OUTPATIENT)
Dept: WOUND CARE | Facility: HOSPITAL | Age: 72
End: 2022-11-22
Payer: MEDICARE

## 2022-11-22 ENCOUNTER — OUTPATIENT (OUTPATIENT)
Dept: OUTPATIENT SERVICES | Facility: HOSPITAL | Age: 72
LOS: 1 days | Discharge: HOME | End: 2022-11-22
Payer: MEDICARE

## 2022-11-22 VITALS
WEIGHT: 140 LBS | HEIGHT: 69 IN | BODY MASS INDEX: 20.73 KG/M2 | SYSTOLIC BLOOD PRESSURE: 174 MMHG | RESPIRATION RATE: 20 BRPM | DIASTOLIC BLOOD PRESSURE: 83 MMHG | HEART RATE: 69 BPM | TEMPERATURE: 99.1 F | OXYGEN SATURATION: 98 %

## 2022-11-22 DIAGNOSIS — Z09 ENCOUNTER FOR FOLLOW-UP EXAMINATION AFTER COMPLETED TREATMENT FOR CONDITIONS OTHER THAN MALIGNANT NEOPLASM: ICD-10-CM

## 2022-11-22 DIAGNOSIS — E11.51 TYPE 2 DIABETES MELLITUS WITH DIABETIC PERIPHERAL ANGIOPATHY W/OUT GANGRENE: ICD-10-CM

## 2022-11-22 DIAGNOSIS — E11.40 TYPE 2 DIABETES MELLITUS WITH DIABETIC NEUROPATHY, UNSPECIFIED: ICD-10-CM

## 2022-11-22 DIAGNOSIS — Z89.411 ACQUIRED ABSENCE OF RIGHT GREAT TOE: Chronic | ICD-10-CM

## 2022-11-22 PROCEDURE — G0463: CPT

## 2022-11-22 PROCEDURE — 99213 OFFICE O/P EST LOW 20 MIN: CPT

## 2022-11-22 RX ORDER — CLOPIDOGREL BISULFATE 75 MG/1
75 TABLET, FILM COATED ORAL DAILY
Refills: 0 | Status: DISCONTINUED | COMMUNITY
End: 2022-11-22

## 2022-11-22 RX ORDER — METOPROLOL TARTRATE 100 MG/1
100 TABLET, FILM COATED ORAL
Refills: 0 | Status: ACTIVE | COMMUNITY

## 2022-11-22 RX ORDER — AMLODIPINE BESYLATE 5 MG/1
5 TABLET ORAL TWICE DAILY
Refills: 0 | Status: DISCONTINUED | COMMUNITY
End: 2022-11-22

## 2022-11-22 RX ORDER — SULFAMETHOXAZOLE AND TRIMETHOPRIM 800; 160 MG/1; MG/1
800-160 TABLET ORAL
Refills: 0 | Status: DISCONTINUED | COMMUNITY
End: 2022-11-22

## 2022-11-22 RX ORDER — ASPIRIN 81 MG
81 TABLET, DELAYED RELEASE (ENTERIC COATED) ORAL DAILY
Refills: 0 | Status: DISCONTINUED | COMMUNITY
End: 2022-11-22

## 2022-11-22 RX ORDER — YOHIMBE BARK 500 MG
CAPSULE ORAL
Refills: 0 | Status: DISCONTINUED | COMMUNITY
End: 2022-11-22

## 2022-11-22 RX ORDER — DILTIAZEM HYDROCHLORIDE 60 MG/1
60 TABLET ORAL EVERY 8 HOURS
Refills: 0 | Status: DISCONTINUED | COMMUNITY
End: 2022-11-22

## 2022-11-22 RX ORDER — ATENOLOL 50 MG/1
50 TABLET ORAL TWICE DAILY
Refills: 0 | Status: DISCONTINUED | COMMUNITY
End: 2022-11-22

## 2022-11-22 RX ORDER — LIDOCAINE AND PRILOCAINE 25; 25 MG/G; MG/G
CREAM TOPICAL
Refills: 0 | Status: DISCONTINUED | COMMUNITY
End: 2022-11-22

## 2022-11-22 RX ORDER — BISACODYL 5 MG/1
5 TABLET ORAL
Refills: 0 | Status: DISCONTINUED | COMMUNITY
End: 2022-11-22

## 2022-11-22 RX ORDER — APIXABAN 2.5 MG/1
2.5 TABLET, FILM COATED ORAL
Refills: 0 | Status: DISCONTINUED | COMMUNITY
End: 2022-11-22

## 2022-11-22 RX ORDER — ZOLPIDEM TARTRATE 10 MG/1
10 TABLET, FILM COATED ORAL
Refills: 0 | Status: DISCONTINUED | COMMUNITY
End: 2022-11-22

## 2022-11-22 NOTE — VITALS
[Pain related to present condition?] : The patient's  pain is not related to present condition. [] : No [de-identified] : 0/10

## 2022-11-22 NOTE — PHYSICAL EXAM
[4 x 4] : 4 x 4  [0] : left 0 [Skin Ulcer] : ulcer [Ankle Swelling (On Exam)] : not present [Varicose Veins Of Lower Extremities] : not present [] : not present [de-identified] : Calm [de-identified] : Status post right hallux and first metatarsal head amputation [de-identified] : Right foot incision site dehiscence with an open wound down to skin, subcutaneous tissue, and fat [de-identified] : Loss of light touch sensation bilaterally [FreeTextEntry1] : Hallux Amp Site [FreeTextEntry2] : 2.6 [FreeTextEntry3] : 0.9 [FreeTextEntry4] : 1.1 [de-identified] : serosanguineous [de-identified] : mildly with mild erythema [de-identified] : 100% [de-identified] : Santyl Collagenase [de-identified] : Mechanically cleansed with 0.9%normal saline and sterile gauze\par  [TWNoteComboBox1] : Right [TWNoteComboBox4] : Small [TWNoteComboBox5] : No [TWNoteComboBox6] : Surgical [de-identified] : No [de-identified] : Macerated [de-identified] : None [de-identified] : None [de-identified] : None [de-identified] : Yes [de-identified] : Daily [de-identified] : Primary Dressing

## 2022-11-22 NOTE — REVIEW OF SYSTEMS
[FreeTextEntry5] : CHF, CAD status post CABG, hypertension, hyperlipidemia, PVD [FreeTextEntry7] : GERD [FreeTextEntry8] : ESRD on HD [FreeTextEntry9] : S/p right hallux and first metatarsal head amputation [de-identified] : Right foot incision site dehiscence with an open wound down to skin, subcutaneous tissue, and fat [de-identified] : Type 2 diabetes

## 2022-11-22 NOTE — ASSESSMENT
[Verbal] : Verbal [Written] : Written [Demo] : Demo [Patient] : Patient [Family member] : Family member [Good - alert, interested, motivated] : Good - alert, interested, motivated [Needs reinforcement] : needs reinforcement [Dressing changes] : dressing changes [Foot Care] : foot care [Signs and symptoms of infection] : sign and symptoms of infection [Nutrition] : nutrition [How and When to Call] : how and when to call [Patient responsibility to plan of care] : patient responsibility to plan of care [Glycemic Control] : glycemic control [Stable] : stable [LTC] : LTC [Wheelchair] : Wheelchair [] : No [FreeTextEntry2] : Infection Prevention\par Local Wound Care\par Restore Skin Integrity\par Glycemic Control\par Foot Care\par Sharps debridement\par Delayed Primary Closure\par Enzymatic debridement\par F/U 2 weeks [FreeTextEntry3] : Initial visit  [FreeTextEntry4] : DPM ordered a sharps debridement, delayed primary closure, and a vascular consult.\par Authorization for a sharps debridement and delayed primary closure submitted.\par Request for a vascular consult submitted\par F/U 2 weeks\par  [FreeTextEntry1] : [place]- Instructions sent with patient

## 2022-11-22 NOTE — PLAN
[FreeTextEntry1] : Patient examined and evaluated at this time.\par Continue local wound care and offloading.\par We will auth for debridement with delayed primary closure.\par Patient will need to follow-up with Dr. Miller for vascular postoperative follow-up.\par Spent 30 minutes for patient care and medical decision making.\par Patient to follow up in 1 week.\par

## 2022-11-22 NOTE — HISTORY OF PRESENT ILLNESS
[FreeTextEntry1] : Patient was seen at the [facility] in July 2022 and never returned .She was admitted to [facility] s/p fall on 9/12/22. She was treated for an infected post op wound. She was recommended to followup for care at the [facility].

## 2022-11-22 NOTE — REASON FOR VISIT
[Consultation] : a consultation visit [Family Member] : family member [FreeTextEntry1] : Initial visit

## 2022-11-23 DIAGNOSIS — Z82.0 FAMILY HISTORY OF EPILEPSY AND OTHER DISEASES OF THE NERVOUS SYSTEM: ICD-10-CM

## 2022-11-23 DIAGNOSIS — I50.9 HEART FAILURE, UNSPECIFIED: ICD-10-CM

## 2022-11-23 DIAGNOSIS — Z79.4 LONG TERM (CURRENT) USE OF INSULIN: ICD-10-CM

## 2022-11-23 DIAGNOSIS — Z79.899 OTHER LONG TERM (CURRENT) DRUG THERAPY: ICD-10-CM

## 2022-11-23 DIAGNOSIS — Z89.411 ACQUIRED ABSENCE OF RIGHT GREAT TOE: ICD-10-CM

## 2022-11-23 DIAGNOSIS — E11.51 TYPE 2 DIABETES MELLITUS WITH DIABETIC PERIPHERAL ANGIOPATHY WITHOUT GANGRENE: ICD-10-CM

## 2022-11-23 DIAGNOSIS — E78.5 HYPERLIPIDEMIA, UNSPECIFIED: ICD-10-CM

## 2022-11-23 DIAGNOSIS — I13.2 HYPERTENSIVE HEART AND CHRONIC KIDNEY DISEASE WITH HEART FAILURE AND WITH STAGE 5 CHRONIC KIDNEY DISEASE, OR END STAGE RENAL DISEASE: ICD-10-CM

## 2022-11-23 DIAGNOSIS — Z95.1 PRESENCE OF AORTOCORONARY BYPASS GRAFT: ICD-10-CM

## 2022-11-23 DIAGNOSIS — Z87.891 PERSONAL HISTORY OF NICOTINE DEPENDENCE: ICD-10-CM

## 2022-11-23 DIAGNOSIS — Z95.5 PRESENCE OF CORONARY ANGIOPLASTY IMPLANT AND GRAFT: ICD-10-CM

## 2022-11-23 DIAGNOSIS — T87.89 OTHER COMPLICATIONS OF AMPUTATION STUMP: ICD-10-CM

## 2022-11-23 DIAGNOSIS — Z91.040 LATEX ALLERGY STATUS: ICD-10-CM

## 2022-11-23 DIAGNOSIS — Z95.828 PRESENCE OF OTHER VASCULAR IMPLANTS AND GRAFTS: ICD-10-CM

## 2022-11-23 DIAGNOSIS — N18.6 END STAGE RENAL DISEASE: ICD-10-CM

## 2022-11-23 DIAGNOSIS — Z98.890 OTHER SPECIFIED POSTPROCEDURAL STATES: ICD-10-CM
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
## 2022-11-23 DIAGNOSIS — E11.621 TYPE 2 DIABETES MELLITUS WITH FOOT ULCER: ICD-10-CM

## 2022-11-23 DIAGNOSIS — E11.22 TYPE 2 DIABETES MELLITUS WITH DIABETIC CHRONIC KIDNEY DISEASE: ICD-10-CM

## 2022-11-23 DIAGNOSIS — E11.40 TYPE 2 DIABETES MELLITUS WITH DIABETIC NEUROPATHY, UNSPECIFIED: ICD-10-CM

## 2022-11-23 DIAGNOSIS — Z99.2 DEPENDENCE ON RENAL DIALYSIS: ICD-10-CM

## 2022-11-23 DIAGNOSIS — Z82.49 FAMILY HISTORY OF ISCHEMIC HEART DISEASE AND OTHER DISEASES OF THE CIRCULATORY SYSTEM: ICD-10-CM

## 2022-11-23 DIAGNOSIS — L97.512 NON-PRESSURE CHRONIC ULCER OF OTHER PART OF RIGHT FOOT WITH FAT LAYER EXPOSED: ICD-10-CM
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
## 2022-12-05 ENCOUNTER — NON-APPOINTMENT (OUTPATIENT)
Age: 72
End: 2022-12-05

## 2022-12-06 ENCOUNTER — OUTPATIENT (OUTPATIENT)
Dept: OUTPATIENT SERVICES | Facility: HOSPITAL | Age: 72
LOS: 1 days | Discharge: HOME | End: 2022-12-06
Payer: MEDICARE

## 2022-12-06 ENCOUNTER — APPOINTMENT (OUTPATIENT)
Dept: WOUND CARE | Facility: HOSPITAL | Age: 72
End: 2022-12-06

## 2022-12-06 ENCOUNTER — NON-APPOINTMENT (OUTPATIENT)
Age: 72
End: 2022-12-06

## 2022-12-06 VITALS
BODY MASS INDEX: 20.73 KG/M2 | HEIGHT: 69 IN | RESPIRATION RATE: 20 BRPM | TEMPERATURE: 98.2 F | DIASTOLIC BLOOD PRESSURE: 69 MMHG | OXYGEN SATURATION: 99 % | WEIGHT: 140 LBS | SYSTOLIC BLOOD PRESSURE: 161 MMHG | HEART RATE: 75 BPM

## 2022-12-06 DIAGNOSIS — Z89.411 ACQUIRED ABSENCE OF RIGHT GREAT TOE: Chronic | ICD-10-CM

## 2022-12-06 DIAGNOSIS — Z09 ENCOUNTER FOR FOLLOW-UP EXAMINATION AFTER COMPLETED TREATMENT FOR CONDITIONS OTHER THAN MALIGNANT NEOPLASM: ICD-10-CM

## 2022-12-06 PROCEDURE — G0463: CPT

## 2022-12-06 PROCEDURE — 99213 OFFICE O/P EST LOW 20 MIN: CPT

## 2022-12-06 NOTE — VITALS
[Pain related to present condition?] : The patient's  pain is not related to present condition. [] : No [de-identified] : 0/10

## 2022-12-06 NOTE — HISTORY OF PRESENT ILLNESS
[FreeTextEntry1] : Patient was seen at the [facility] in July 2022 and never returned. She was admitted to [facility] s/p fall on 9/12/22. She was treated for an infected post op wound. She was recommended to followup for care at the [facility].\par \par 12/6/22: Patient seen for follow up s/p right hallux amputation. Pt currently in a facility at this time. Patient's family relates that the dressings may have not been changed as advised since her last visit here.

## 2022-12-06 NOTE — REVIEW OF SYSTEMS
[FreeTextEntry5] : CHF, CAD status post CABG, hypertension, hyperlipidemia, PVD [FreeTextEntry7] : GERD [FreeTextEntry8] : ESRD on HD [FreeTextEntry9] : S/p right hallux and first metatarsal head amputation [de-identified] : Right foot incision site dehiscence with an open wound down to skin, subcutaneous tissue, and fat, healing [de-identified] : Type 2 diabetes

## 2022-12-06 NOTE — VITALS
[Pain related to present condition?] : The patient's  pain is not related to present condition. [] : No [de-identified] : 0/10

## 2022-12-06 NOTE — REVIEW OF SYSTEMS
[FreeTextEntry5] : CHF, CAD status post CABG, hypertension, hyperlipidemia, PVD [FreeTextEntry7] : GERD [FreeTextEntry8] : ESRD on HD [FreeTextEntry9] : S/p right hallux and first metatarsal head amputation [de-identified] : Right foot incision site dehiscence with an open wound down to skin, subcutaneous tissue, and fat, healing [de-identified] : Type 2 diabetes

## 2022-12-06 NOTE — HISTORY OF PRESENT ILLNESS
[FreeTextEntry1] : Patient was seen at the [facility] in July 2022 and never returned. She was admitted to [facility] s/p fall on 9/12/22. She was treated for an infected post op wound. She was recommended to followup for care at the [facility].\par \par 12/6/22: Patient seen for follow up s/p right hallux amputation. Pt currently in a facility at this time. Patient's family relates that the dressings may have not been changed as advised since her last visit here.

## 2022-12-06 NOTE — ASSESSMENT
[Verbal] : Verbal [Written] : Written [Demo] : Demo [Patient] : Patient [Family member] : Family member [Good - alert, interested, motivated] : Good - alert, interested, motivated [Needs reinforcement] : needs reinforcement [Dressing changes] : dressing changes [Foot Care] : foot care [Signs and symptoms of infection] : sign and symptoms of infection [Nutrition] : nutrition [How and When to Call] : how and when to call [Patient responsibility to plan of care] : patient responsibility to plan of care [Glycemic Control] : glycemic control [Stable] : stable [LTC] : LTC [Wheelchair] : Wheelchair [] : No [FreeTextEntry2] : Infection Prevention\par Local Wound Care\par Restore Skin Integrity\par Glycemic Control\par Foot Care\par Autolytic debridement\par Vascular consult\par F/U 2 weeks [FreeTextEntry4] : DPM deferred sharps debridement and delayed primary closure due to wound progression \par Patient's son stated the patient can not make appointments on Monday's due to her dialysis schedule and his work schedule. The patient's son was provided with the contact information for the [place] office to schedule an appointment for a vascular consult with Dr. Miller.\par F/U 2 weeks\par  [FreeTextEntry1] : [place]- Instructions sent with patient

## 2022-12-06 NOTE — PHYSICAL EXAM
[4 x 4] : 4 x 4  [0] : left 0 [Skin Ulcer] : ulcer [Ankle Swelling (On Exam)] : not present [Varicose Veins Of Lower Extremities] : not present [] : not present [de-identified] : Calm [de-identified] : Status post right hallux and first metatarsal head amputation [de-identified] : Right foot incision site dehiscence with an open wound down to skin, subcutaneous tissue, and fat, healing [de-identified] : Loss of light touch sensation bilaterally [FreeTextEntry1] : Hallux Amp Site [FreeTextEntry2] : 1.6 [FreeTextEntry3] : 0.7 [FreeTextEntry4] : 0.5 [de-identified] : serosanguineous [de-identified] : callus [de-identified] : 100% [de-identified] : Medihoney [de-identified] : Mechanically cleansed with 0.9%normal saline and sterile gauze\par \par Post debridement measurements:\par \par  [FreeTextEntry7] : Foot 2nd Digit- Dry eschar [de-identified] : No treatment required  [de-identified] : Mechanically cleansed with 0.9%normal saline and sterile gauze\par  [TWNoteComboBox1] : Right [TWNoteComboBox4] : Small [TWNoteComboBox5] : No [TWNoteComboBox6] : Surgical [de-identified] : No [de-identified] : other [de-identified] : None [de-identified] : None [de-identified] : None [de-identified] : Yes [de-identified] : False [de-identified] : False [de-identified] : 3x Weekly [de-identified] : Primary Dressing [TWNoteComboBox9] : Right

## 2022-12-06 NOTE — ASSESSMENT
[Verbal] : Verbal [Written] : Written [Demo] : Demo [Patient] : Patient [Family member] : Family member [Good - alert, interested, motivated] : Good - alert, interested, motivated [Needs reinforcement] : needs reinforcement [Dressing changes] : dressing changes [Foot Care] : foot care [Signs and symptoms of infection] : sign and symptoms of infection [Nutrition] : nutrition [How and When to Call] : how and when to call [Patient responsibility to plan of care] : patient responsibility to plan of care [Glycemic Control] : glycemic control [Stable] : stable [LTC] : LTC [Wheelchair] : Wheelchair [] : No [FreeTextEntry2] : Infection Prevention\par Local Wound Care\par Restore Skin Integrity\par Glycemic Control\par Foot Care\par Autolytic debridement\par Vascular consult\par F/U 2 weeks [FreeTextEntry4] : DPM deferred sharps debridement and delayed primary closure due to wound progression \par Patient's son stated the patient can not make appointments on Monday's due to her dialysis schedule and his work schedule. The patient's son was provided with the contact information for the [place] office to schedule an appointment for a vascular consult with Dr. Miller.\par F/U 2 weeks\par  [FreeTextEntry1] : [place]- Instructions sent with patient

## 2022-12-06 NOTE — PLAN
[FreeTextEntry1] : Patient examined and evaluated at this time.\par Continue local wound care and offloading.\par Patient will need to follow-up with Dr. Miller for vascular postoperative follow-up.\par Spent 20 minutes for patient care and medical decision making.\par Patient to follow up in 1 week.\par

## 2022-12-09 DIAGNOSIS — Z91.040 LATEX ALLERGY STATUS: ICD-10-CM
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
## 2022-12-09 DIAGNOSIS — L97.512 NON-PRESSURE CHRONIC ULCER OF OTHER PART OF RIGHT FOOT WITH FAT LAYER EXPOSED: ICD-10-CM

## 2022-12-09 DIAGNOSIS — Z82.0 FAMILY HISTORY OF EPILEPSY AND OTHER DISEASES OF THE NERVOUS SYSTEM: ICD-10-CM

## 2022-12-09 DIAGNOSIS — Z95.5 PRESENCE OF CORONARY ANGIOPLASTY IMPLANT AND GRAFT: ICD-10-CM

## 2022-12-09 DIAGNOSIS — Z79.4 LONG TERM (CURRENT) USE OF INSULIN: ICD-10-CM

## 2022-12-09 DIAGNOSIS — Z98.890 OTHER SPECIFIED POSTPROCEDURAL STATES: ICD-10-CM

## 2022-12-09 DIAGNOSIS — T87.89 OTHER COMPLICATIONS OF AMPUTATION STUMP: ICD-10-CM

## 2022-12-09 DIAGNOSIS — E11.40 TYPE 2 DIABETES MELLITUS WITH DIABETIC NEUROPATHY, UNSPECIFIED: ICD-10-CM

## 2022-12-09 DIAGNOSIS — Z82.49 FAMILY HISTORY OF ISCHEMIC HEART DISEASE AND OTHER DISEASES OF THE CIRCULATORY SYSTEM: ICD-10-CM

## 2022-12-09 DIAGNOSIS — Z95.828 PRESENCE OF OTHER VASCULAR IMPLANTS AND GRAFTS: ICD-10-CM

## 2022-12-09 DIAGNOSIS — E11.22 TYPE 2 DIABETES MELLITUS WITH DIABETIC CHRONIC KIDNEY DISEASE: ICD-10-CM

## 2022-12-09 DIAGNOSIS — E11.621 TYPE 2 DIABETES MELLITUS WITH FOOT ULCER: ICD-10-CM

## 2022-12-09 DIAGNOSIS — Z79.899 OTHER LONG TERM (CURRENT) DRUG THERAPY: ICD-10-CM
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
## 2022-12-09 DIAGNOSIS — I13.2 HYPERTENSIVE HEART AND CHRONIC KIDNEY DISEASE WITH HEART FAILURE AND WITH STAGE 5 CHRONIC KIDNEY DISEASE, OR END STAGE RENAL DISEASE: ICD-10-CM

## 2022-12-09 DIAGNOSIS — Z89.411 ACQUIRED ABSENCE OF RIGHT GREAT TOE: ICD-10-CM

## 2022-12-09 DIAGNOSIS — N18.6 END STAGE RENAL DISEASE: ICD-10-CM

## 2022-12-09 DIAGNOSIS — Z99.2 DEPENDENCE ON RENAL DIALYSIS: ICD-10-CM

## 2022-12-09 DIAGNOSIS — I50.9 HEART FAILURE, UNSPECIFIED: ICD-10-CM

## 2022-12-09 DIAGNOSIS — Z87.891 PERSONAL HISTORY OF NICOTINE DEPENDENCE: ICD-10-CM

## 2022-12-09 DIAGNOSIS — E11.51 TYPE 2 DIABETES MELLITUS WITH DIABETIC PERIPHERAL ANGIOPATHY WITHOUT GANGRENE: ICD-10-CM

## 2022-12-09 DIAGNOSIS — Z95.1 PRESENCE OF AORTOCORONARY BYPASS GRAFT: ICD-10-CM

## 2022-12-09 DIAGNOSIS — E78.5 HYPERLIPIDEMIA, UNSPECIFIED: ICD-10-CM

## 2022-12-21 ENCOUNTER — OUTPATIENT (OUTPATIENT)
Dept: OUTPATIENT SERVICES | Facility: HOSPITAL | Age: 72
LOS: 1 days | Discharge: HOME | End: 2022-12-21
Payer: MEDICARE

## 2022-12-21 ENCOUNTER — APPOINTMENT (OUTPATIENT)
Dept: WOUND CARE | Facility: HOSPITAL | Age: 72
End: 2022-12-21
Payer: MEDICARE

## 2022-12-21 VITALS
OXYGEN SATURATION: 97 % | SYSTOLIC BLOOD PRESSURE: 102 MMHG | HEIGHT: 69 IN | BODY MASS INDEX: 20.73 KG/M2 | TEMPERATURE: 98 F | DIASTOLIC BLOOD PRESSURE: 73 MMHG | HEART RATE: 68 BPM | WEIGHT: 140 LBS | RESPIRATION RATE: 20 BRPM

## 2022-12-21 DIAGNOSIS — Z09 ENCOUNTER FOR FOLLOW-UP EXAMINATION AFTER COMPLETED TREATMENT FOR CONDITIONS OTHER THAN MALIGNANT NEOPLASM: ICD-10-CM

## 2022-12-21 DIAGNOSIS — Z89.411 ACQUIRED ABSENCE OF RIGHT GREAT TOE: Chronic | ICD-10-CM

## 2022-12-21 PROCEDURE — 99024 POSTOP FOLLOW-UP VISIT: CPT

## 2022-12-21 PROCEDURE — G0463: CPT

## 2022-12-28 ENCOUNTER — APPOINTMENT (OUTPATIENT)
Dept: VASCULAR SURGERY | Facility: CLINIC | Age: 72
End: 2022-12-28

## 2022-12-29 ENCOUNTER — APPOINTMENT (OUTPATIENT)
Dept: WOUND CARE | Facility: HOSPITAL | Age: 72
End: 2022-12-29
Payer: MEDICARE

## 2022-12-29 ENCOUNTER — OUTPATIENT (OUTPATIENT)
Dept: OUTPATIENT SERVICES | Facility: HOSPITAL | Age: 72
LOS: 1 days | Discharge: HOME | End: 2022-12-29
Payer: MEDICARE

## 2022-12-29 VITALS
OXYGEN SATURATION: 98 % | SYSTOLIC BLOOD PRESSURE: 109 MMHG | HEIGHT: 69 IN | DIASTOLIC BLOOD PRESSURE: 51 MMHG | BODY MASS INDEX: 20.73 KG/M2 | TEMPERATURE: 98.9 F | HEART RATE: 63 BPM | RESPIRATION RATE: 20 BRPM | WEIGHT: 140 LBS

## 2022-12-29 DIAGNOSIS — L97.512 NON-PRESSURE CHRONIC ULCER OF OTHER PART OF RIGHT FOOT WITH FAT LAYER EXPOSED: ICD-10-CM

## 2022-12-29 DIAGNOSIS — Z89.411 ACQUIRED ABSENCE OF RIGHT GREAT TOE: Chronic | ICD-10-CM

## 2022-12-29 DIAGNOSIS — T87.89 OTHER COMPLICATIONS OF AMPUTATION STUMP: ICD-10-CM

## 2022-12-29 DIAGNOSIS — Z89.411 ACQUIRED ABSENCE OF RIGHT GREAT TOE: ICD-10-CM

## 2022-12-29 DIAGNOSIS — L97.509 TYPE 2 DIABETES MELLITUS WITH FOOT ULCER: ICD-10-CM

## 2022-12-29 DIAGNOSIS — E11.621 TYPE 2 DIABETES MELLITUS WITH FOOT ULCER: ICD-10-CM

## 2022-12-29 PROCEDURE — 99213 OFFICE O/P EST LOW 20 MIN: CPT

## 2022-12-29 PROCEDURE — G0463: CPT

## 2023-01-03 DIAGNOSIS — Z98.890 OTHER SPECIFIED POSTPROCEDURAL STATES: ICD-10-CM

## 2023-01-03 DIAGNOSIS — T87.89 OTHER COMPLICATIONS OF AMPUTATION STUMP: ICD-10-CM

## 2023-01-03 DIAGNOSIS — Z79.4 LONG TERM (CURRENT) USE OF INSULIN: ICD-10-CM
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
## 2023-01-03 DIAGNOSIS — I50.9 HEART FAILURE, UNSPECIFIED: ICD-10-CM

## 2023-01-03 DIAGNOSIS — E11.51 TYPE 2 DIABETES MELLITUS WITH DIABETIC PERIPHERAL ANGIOPATHY WITHOUT GANGRENE: ICD-10-CM

## 2023-01-03 DIAGNOSIS — L97.512 NON-PRESSURE CHRONIC ULCER OF OTHER PART OF RIGHT FOOT WITH FAT LAYER EXPOSED: ICD-10-CM

## 2023-01-03 DIAGNOSIS — Z79.899 OTHER LONG TERM (CURRENT) DRUG THERAPY: ICD-10-CM

## 2023-01-03 DIAGNOSIS — Z99.2 DEPENDENCE ON RENAL DIALYSIS: ICD-10-CM

## 2023-01-03 DIAGNOSIS — Z89.411 ACQUIRED ABSENCE OF RIGHT GREAT TOE: ICD-10-CM
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
## 2023-01-03 DIAGNOSIS — E11.40 TYPE 2 DIABETES MELLITUS WITH DIABETIC NEUROPATHY, UNSPECIFIED: ICD-10-CM

## 2023-01-03 DIAGNOSIS — Z82.0 FAMILY HISTORY OF EPILEPSY AND OTHER DISEASES OF THE NERVOUS SYSTEM: ICD-10-CM

## 2023-01-03 DIAGNOSIS — Z82.49 FAMILY HISTORY OF ISCHEMIC HEART DISEASE AND OTHER DISEASES OF THE CIRCULATORY SYSTEM: ICD-10-CM

## 2023-01-03 DIAGNOSIS — Z95.828 PRESENCE OF OTHER VASCULAR IMPLANTS AND GRAFTS: ICD-10-CM

## 2023-01-03 DIAGNOSIS — E78.5 HYPERLIPIDEMIA, UNSPECIFIED: ICD-10-CM

## 2023-01-03 DIAGNOSIS — Z95.5 PRESENCE OF CORONARY ANGIOPLASTY IMPLANT AND GRAFT: ICD-10-CM

## 2023-01-03 DIAGNOSIS — Z87.891 PERSONAL HISTORY OF NICOTINE DEPENDENCE: ICD-10-CM

## 2023-01-03 DIAGNOSIS — N18.6 END STAGE RENAL DISEASE: ICD-10-CM

## 2023-01-03 DIAGNOSIS — Z95.1 PRESENCE OF AORTOCORONARY BYPASS GRAFT: ICD-10-CM

## 2023-01-03 DIAGNOSIS — E11.22 TYPE 2 DIABETES MELLITUS WITH DIABETIC CHRONIC KIDNEY DISEASE: ICD-10-CM

## 2023-01-03 DIAGNOSIS — Z91.040 LATEX ALLERGY STATUS: ICD-10-CM

## 2023-01-03 DIAGNOSIS — I13.2 HYPERTENSIVE HEART AND CHRONIC KIDNEY DISEASE WITH HEART FAILURE AND WITH STAGE 5 CHRONIC KIDNEY DISEASE, OR END STAGE RENAL DISEASE: ICD-10-CM

## 2023-01-03 DIAGNOSIS — E11.621 TYPE 2 DIABETES MELLITUS WITH FOOT ULCER: ICD-10-CM

## 2023-01-03 NOTE — PLAN
[FreeTextEntry1] : Patient examined and evaluated at this time. Patient has been at a facility and was unable to resent \par Continue local wound care and offloading. \par Patient will need to follow-up with Dr. Miller for vascular postoperative follow-up.\par Spent 20 minutes for patient care and medical decision making.\par Patient to follow up in 1 week.\par

## 2023-01-03 NOTE — HISTORY OF PRESENT ILLNESS
[FreeTextEntry1] : Patient is 72 year old female presenting for follow up s/p right hallux amputation. Patient has been in a facility and has recently started following up at the [facility]. Patient is accompanied by her son. Patient denies any pain to the right foot.

## 2023-01-03 NOTE — REVIEW OF SYSTEMS
[Fever] : no fever [Chills] : no chills [Eye Pain] : no eye pain [Earache] : no earache [Shortness Of Breath] : no shortness of breath [Abdominal Pain] : no abdominal pain [Vomiting] : no vomiting [FreeTextEntry5] : CHF, CAD status post CABG, hypertension, hyperlipidemia, PVD [FreeTextEntry7] : GERD [FreeTextEntry8] : ESRD on HD [FreeTextEntry9] : S/p right hallux and first metatarsal head amputation [de-identified] : Right foot incision site dehiscence with an open wound down to skin, subcutaneous tissue, and fat, healing [de-identified] : Type 2 diabetes

## 2023-01-03 NOTE — PHYSICAL EXAM
[4 x 4] : 4 x 4  [0] : left 0 [Ankle Swelling (On Exam)] : not present [Varicose Veins Of Lower Extremities] : not present [] : not present [Skin Ulcer] : ulcer [de-identified] : Calm [de-identified] : Status post right hallux and first metatarsal head amputation [de-identified] : Right foot incision site dehiscence with an open wound down to skin, subcutaneous tissue, and fat, healing [de-identified] : Loss of light touch sensation bilaterally [de-identified] : Mechanically cleansed with 0.9% normal saline and sterile gauze applied.\par dry dressing\par

## 2023-01-04 ENCOUNTER — APPOINTMENT (OUTPATIENT)
Dept: WOUND CARE | Facility: HOSPITAL | Age: 73
End: 2023-01-04

## 2023-01-10 DIAGNOSIS — Z87.891 PERSONAL HISTORY OF NICOTINE DEPENDENCE: ICD-10-CM
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
## 2023-01-10 DIAGNOSIS — Z79.4 LONG TERM (CURRENT) USE OF INSULIN: ICD-10-CM

## 2023-01-10 DIAGNOSIS — E11.40 TYPE 2 DIABETES MELLITUS WITH DIABETIC NEUROPATHY, UNSPECIFIED: ICD-10-CM

## 2023-01-10 DIAGNOSIS — I50.9 HEART FAILURE, UNSPECIFIED: ICD-10-CM

## 2023-01-10 DIAGNOSIS — E11.621 TYPE 2 DIABETES MELLITUS WITH FOOT ULCER: ICD-10-CM

## 2023-01-10 DIAGNOSIS — L97.512 NON-PRESSURE CHRONIC ULCER OF OTHER PART OF RIGHT FOOT WITH FAT LAYER EXPOSED: ICD-10-CM

## 2023-01-10 DIAGNOSIS — Z99.2 DEPENDENCE ON RENAL DIALYSIS: ICD-10-CM

## 2023-01-10 DIAGNOSIS — E78.5 HYPERLIPIDEMIA, UNSPECIFIED: ICD-10-CM

## 2023-01-10 DIAGNOSIS — Z91.040 LATEX ALLERGY STATUS: ICD-10-CM

## 2023-01-10 DIAGNOSIS — Z98.890 OTHER SPECIFIED POSTPROCEDURAL STATES: ICD-10-CM

## 2023-01-10 DIAGNOSIS — Z79.899 OTHER LONG TERM (CURRENT) DRUG THERAPY: ICD-10-CM

## 2023-01-10 DIAGNOSIS — Z95.828 PRESENCE OF OTHER VASCULAR IMPLANTS AND GRAFTS: ICD-10-CM

## 2023-01-10 DIAGNOSIS — Z89.411 ACQUIRED ABSENCE OF RIGHT GREAT TOE: ICD-10-CM

## 2023-01-10 DIAGNOSIS — T87.89 OTHER COMPLICATIONS OF AMPUTATION STUMP: ICD-10-CM

## 2023-01-10 DIAGNOSIS — Z95.5 PRESENCE OF CORONARY ANGIOPLASTY IMPLANT AND GRAFT: ICD-10-CM

## 2023-01-10 DIAGNOSIS — I13.2 HYPERTENSIVE HEART AND CHRONIC KIDNEY DISEASE WITH HEART FAILURE AND WITH STAGE 5 CHRONIC KIDNEY DISEASE, OR END STAGE RENAL DISEASE: ICD-10-CM

## 2023-01-10 DIAGNOSIS — E11.22 TYPE 2 DIABETES MELLITUS WITH DIABETIC CHRONIC KIDNEY DISEASE: ICD-10-CM

## 2023-01-10 DIAGNOSIS — E11.51 TYPE 2 DIABETES MELLITUS WITH DIABETIC PERIPHERAL ANGIOPATHY WITHOUT GANGRENE: ICD-10-CM

## 2023-01-10 DIAGNOSIS — N18.6 END STAGE RENAL DISEASE: ICD-10-CM

## 2023-01-10 DIAGNOSIS — Z82.0 FAMILY HISTORY OF EPILEPSY AND OTHER DISEASES OF THE NERVOUS SYSTEM: ICD-10-CM

## 2023-01-10 DIAGNOSIS — Z82.49 FAMILY HISTORY OF ISCHEMIC HEART DISEASE AND OTHER DISEASES OF THE CIRCULATORY SYSTEM: ICD-10-CM

## 2023-01-10 DIAGNOSIS — Z95.1 PRESENCE OF AORTOCORONARY BYPASS GRAFT: ICD-10-CM

## 2023-01-10 NOTE — REVIEW OF SYSTEMS
[Fever] : no fever [Chills] : no chills [Eye Pain] : no eye pain [Earache] : no earache [Shortness Of Breath] : no shortness of breath [Abdominal Pain] : no abdominal pain [Vomiting] : no vomiting [FreeTextEntry5] : CHF, CAD status post CABG, hypertension, hyperlipidemia, PVD [FreeTextEntry7] : GERD [FreeTextEntry8] : ESRD on HD [FreeTextEntry9] : S/p right hallux and first metatarsal head amputation [de-identified] : Right foot incision site dehiscence with an open wound down to skin, subcutaneous tissue, and fat, healing [de-identified] : Type 2 diabetes

## 2023-01-10 NOTE — PHYSICAL EXAM
[4 x 4] : 4 x 4  [0] : left 0 [Skin Ulcer] : ulcer [Ankle Swelling (On Exam)] : not present [Varicose Veins Of Lower Extremities] : not present [] : not present [de-identified] : Calm [de-identified] : Status post right hallux and first metatarsal head amputation [de-identified] : Right foot incision site dehiscence with an open wound down to skin, subcutaneous tissue, and fat, healing [de-identified] : Loss of light touch sensation bilaterally [de-identified] : Dry eschar removed by DPM [FreeTextEntry1] : Hallux Amp Site  [FreeTextEntry2] : 1.4 [FreeTextEntry3] : 0.5 [FreeTextEntry4] : 0.4 [de-identified] : 0.3cm @12 o'clock  [de-identified] : dry eschar  [de-identified] : 10% [de-identified] : 90% [de-identified] : Medihoney  [de-identified] : Mechanically cleansed with 0.9% normal saline and sterile gauze applied.\par \par  [FreeTextEntry7] : Foot 2nd Digit- Dry eschar  [de-identified] : Mechanically cleansed with 0.9%normal saline and sterile gauze\par  [TWNoteComboBox1] : Right [TWNoteComboBox4] : Small [TWNoteComboBox5] : No [TWNoteComboBox6] : Surgical [de-identified] : Yes [de-identified] : other [de-identified] : None [de-identified] : None [de-identified] : Yes [de-identified] : 3x Weekly [de-identified] : Primary Dressing [TWNoteComboBox9] : Right

## 2023-01-10 NOTE — PLAN
[FreeTextEntry1] : Patient examined and evaluated at this time. Patient has been at a facility and was unable to resent \par Continue local wound care and offloading. \par Patient to be scheduled for vascular follow up consult with  Dr. Miller\par Spent 20 minutes for patient care and medical decision making.\par Patient to follow up in 1 week.\par

## 2023-01-10 NOTE — ASSESSMENT
[Verbal] : Verbal [Written] : Written [Demo] : Demo [Patient] : Patient [Family member] : Family member [Good - alert, interested, motivated] : Good - alert, interested, motivated [Verbalizes knowledge/Understanding] : Verbalizes knowledge/understanding [Dressing changes] : dressing changes [Foot Care] : foot care [Signs and symptoms of infection] : sign and symptoms of infection [How and When to Call] : how and when to call [Patient responsibility to plan of care] : patient responsibility to plan of care [Glycemic Control] : glycemic control [Stable] : stable [Home] : Home [Wheelchair] : Wheelchair [] : No [FreeTextEntry2] : Infection Prevention\par restore Skin Integrity\par Autolytic debridement\par F/U 1 week  [FreeTextEntry4] : No s/s of infection \par F/U

## 2023-01-10 NOTE — VITALS
[Pain related to present condition?] : The patient's  pain is not related to present condition. [] : No [de-identified] : 0/10

## 2023-01-10 NOTE — HISTORY OF PRESENT ILLNESS
[FreeTextEntry1] : Patient is 72 year old female presenting for follow up s/p right hallux amputation with dehiscence down to skin subcutaneous tissue and fat. Patient has been in a facility and has recently started following up at the [facility]. Patient denies any pain to the right foot.

## 2023-01-11 ENCOUNTER — APPOINTMENT (OUTPATIENT)
Dept: VASCULAR SURGERY | Facility: CLINIC | Age: 73
End: 2023-01-11
Payer: MEDICARE

## 2023-01-11 VITALS
DIASTOLIC BLOOD PRESSURE: 93 MMHG | SYSTOLIC BLOOD PRESSURE: 207 MMHG | OXYGEN SATURATION: 100 % | WEIGHT: 127 LBS | BODY MASS INDEX: 18.81 KG/M2 | HEIGHT: 69 IN | HEART RATE: 81 BPM

## 2023-01-11 DIAGNOSIS — T81.31XD DISRUPTION OF EXTERNAL OPERATION (SURGICAL) WOUND, NOT ELSEWHERE CLASSIFIED, SUBSEQUENT ENCOUNTER: ICD-10-CM

## 2023-01-11 DIAGNOSIS — I73.9 PERIPHERAL VASCULAR DISEASE, UNSPECIFIED: ICD-10-CM

## 2023-01-11 DIAGNOSIS — T87.81 DEHISCENCE OF AMPUTATION STUMP: ICD-10-CM

## 2023-01-11 DIAGNOSIS — T87.89 OTHER COMPLICATIONS OF AMPUTATION STUMP: ICD-10-CM

## 2023-01-11 PROCEDURE — 93926 LOWER EXTREMITY STUDY: CPT

## 2023-01-11 PROCEDURE — 99213 OFFICE O/P EST LOW 20 MIN: CPT

## 2023-01-11 NOTE — HISTORY OF PRESENT ILLNESS
[FreeTextEntry1] : 71 year old female with PMH of Afib (not on AC for hx of multiple falls), T2DM, HTN, HLD, ESRD on HD (MWF), CHF, CAD s/p CABG, PAD S/P R-->L bifem-fem BK pop bypass, recent fempop bypass (6/21/2022), and partial ray amputation right great toe (6/22/2022) Pathology Final Diagnosis  06/23/22 1. Right hallux -Acute and chronic osteomyelitis. -Gangrenous skin and soft tissue. 2. Right first metatarsal/clean margin: -Minimal chronic osteomyelitis. Was on Unasyn to complete 6 weeks on 7/26 - Wound culture with Klebsiella, enterobacter, and also Stenotrophomonas but unclear if real infection .Followed  by ID at [facility]\par \par 1/11/2023: Ms. SHILO KOHLER is a 72 year F who presents for follow-up evaluation of fem-pop bypass on 6/21/22. Ms. KOHLER complains of pain in her right foot s/p great toe amputation. Pt states she does not ambulate.  Wound is still open with slight drainage and redness.\par \par \par

## 2023-01-11 NOTE — REVIEW OF SYSTEMS
[Limb Pain] : limb pain [Limb Swelling] : limb swelling [As Noted in HPI] : as noted in HPI [Skin Lesions] : skin lesion [Skin Wound] : skin wound [Negative] : Heme/Lymph [de-identified] : R foot

## 2023-01-11 NOTE — ASSESSMENT
[FreeTextEntry1] : Ms. KOHLER is doing well however still has pain and wound dehiscence of right 1st toe amputation.  Only doppler signals. Patients US from today and 2 m ago reveal a patent bypass but monophasic flow BK

## 2023-01-11 NOTE — PHYSICAL EXAM
[FreeTextEntry1] : R PT dopplerable 2+, DP dopplerable faint but present.\par L PT dopplerable monophasic, L DP dopplerable 1+ [de-identified] : R 1st digit amputated, wound is dishised with erythematous and flatuant skin around the opening. White appearance. Not able to express any discharge.

## 2023-01-17 ENCOUNTER — NON-APPOINTMENT (OUTPATIENT)
Age: 73
End: 2023-01-17

## 2023-01-27 ENCOUNTER — APPOINTMENT (OUTPATIENT)
Dept: CARDIOLOGY | Facility: CLINIC | Age: 73
End: 2023-01-27

## 2023-01-31 ENCOUNTER — OUTPATIENT (OUTPATIENT)
Dept: OUTPATIENT SERVICES | Facility: HOSPITAL | Age: 73
LOS: 1 days | End: 2023-01-31
Payer: MEDICARE

## 2023-01-31 VITALS
HEART RATE: 63 BPM | DIASTOLIC BLOOD PRESSURE: 84 MMHG | RESPIRATION RATE: 16 BRPM | OXYGEN SATURATION: 100 % | SYSTOLIC BLOOD PRESSURE: 149 MMHG | WEIGHT: 119.05 LBS | TEMPERATURE: 97 F

## 2023-01-31 DIAGNOSIS — Z01.818 ENCOUNTER FOR OTHER PREPROCEDURAL EXAMINATION: ICD-10-CM

## 2023-01-31 DIAGNOSIS — E11.9 TYPE 2 DIABETES MELLITUS WITHOUT COMPLICATIONS: ICD-10-CM

## 2023-01-31 DIAGNOSIS — I73.9 PERIPHERAL VASCULAR DISEASE, UNSPECIFIED: ICD-10-CM

## 2023-01-31 DIAGNOSIS — Z89.411 ACQUIRED ABSENCE OF RIGHT GREAT TOE: Chronic | ICD-10-CM

## 2023-01-31 DIAGNOSIS — I25.10 ATHEROSCLEROTIC HEART DISEASE OF NATIVE CORONARY ARTERY WITHOUT ANGINA PECTORIS: ICD-10-CM

## 2023-01-31 LAB
A1C WITH ESTIMATED AVERAGE GLUCOSE RESULT: 6.8 % — HIGH (ref 4–5.6)
ALBUMIN SERPL ELPH-MCNC: 3.8 G/DL — SIGNIFICANT CHANGE UP (ref 3.3–5)
ALP SERPL-CCNC: 120 U/L — SIGNIFICANT CHANGE UP (ref 40–120)
ALT FLD-CCNC: 12 U/L — SIGNIFICANT CHANGE UP (ref 12–78)
ANION GAP SERPL CALC-SCNC: 11 MMOL/L — SIGNIFICANT CHANGE UP (ref 5–17)
APTT BLD: 27.5 SEC — SIGNIFICANT CHANGE UP (ref 27.5–35.5)
AST SERPL-CCNC: 6 U/L — LOW (ref 15–37)
BILIRUB SERPL-MCNC: 0.4 MG/DL — SIGNIFICANT CHANGE UP (ref 0.2–1.2)
BUN SERPL-MCNC: 82 MG/DL — HIGH (ref 7–23)
CALCIUM SERPL-MCNC: 9.5 MG/DL — SIGNIFICANT CHANGE UP (ref 8.5–10.1)
CHLORIDE SERPL-SCNC: 94 MMOL/L — LOW (ref 96–108)
CO2 SERPL-SCNC: 25 MMOL/L — SIGNIFICANT CHANGE UP (ref 22–31)
CREAT SERPL-MCNC: 8.8 MG/DL — HIGH (ref 0.5–1.3)
EGFR: 4 ML/MIN/1.73M2 — LOW
ESTIMATED AVERAGE GLUCOSE: 148 MG/DL — HIGH (ref 68–114)
GLUCOSE SERPL-MCNC: 249 MG/DL — HIGH (ref 70–99)
HCT VFR BLD CALC: 40.6 % — SIGNIFICANT CHANGE UP (ref 34.5–45)
HGB BLD-MCNC: 13.1 G/DL — SIGNIFICANT CHANGE UP (ref 11.5–15.5)
INR BLD: 0.99 RATIO — SIGNIFICANT CHANGE UP (ref 0.88–1.16)
MCHC RBC-ENTMCNC: 30.9 PG — SIGNIFICANT CHANGE UP (ref 27–34)
MCHC RBC-ENTMCNC: 32.3 GM/DL — SIGNIFICANT CHANGE UP (ref 32–36)
MCV RBC AUTO: 95.8 FL — SIGNIFICANT CHANGE UP (ref 80–100)
NRBC # BLD: 0 /100 WBCS — SIGNIFICANT CHANGE UP (ref 0–0)
PLATELET # BLD AUTO: 218 K/UL — SIGNIFICANT CHANGE UP (ref 150–400)
POTASSIUM SERPL-MCNC: 6 MMOL/L — HIGH (ref 3.5–5.3)
POTASSIUM SERPL-SCNC: 6 MMOL/L — HIGH (ref 3.5–5.3)
PROT SERPL-MCNC: 7.9 G/DL — SIGNIFICANT CHANGE UP (ref 6–8.3)
PROTHROM AB SERPL-ACNC: 11.6 SEC — SIGNIFICANT CHANGE UP (ref 10.5–13.4)
RBC # BLD: 4.24 M/UL — SIGNIFICANT CHANGE UP (ref 3.8–5.2)
RBC # FLD: 14.6 % — HIGH (ref 10.3–14.5)
SODIUM SERPL-SCNC: 130 MMOL/L — LOW (ref 135–145)
WBC # BLD: 7.53 K/UL — SIGNIFICANT CHANGE UP (ref 3.8–10.5)
WBC # FLD AUTO: 7.53 K/UL — SIGNIFICANT CHANGE UP (ref 3.8–10.5)

## 2023-01-31 PROCEDURE — 85027 COMPLETE CBC AUTOMATED: CPT

## 2023-01-31 PROCEDURE — 83036 HEMOGLOBIN GLYCOSYLATED A1C: CPT

## 2023-01-31 PROCEDURE — 85730 THROMBOPLASTIN TIME PARTIAL: CPT

## 2023-01-31 PROCEDURE — 36415 COLL VENOUS BLD VENIPUNCTURE: CPT

## 2023-01-31 PROCEDURE — 80053 COMPREHEN METABOLIC PANEL: CPT

## 2023-01-31 PROCEDURE — G0463: CPT

## 2023-01-31 PROCEDURE — 85610 PROTHROMBIN TIME: CPT

## 2023-01-31 RX ORDER — SENNA PLUS 8.6 MG/1
2 TABLET ORAL
Qty: 0 | Refills: 0 | DISCHARGE

## 2023-01-31 RX ORDER — TRAMADOL HYDROCHLORIDE 50 MG/1
1 TABLET ORAL
Qty: 0 | Refills: 0 | DISCHARGE

## 2023-01-31 RX ORDER — LACTOBACILLUS ACIDOPHILUS 100MM CELL
2 CAPSULE ORAL
Qty: 0 | Refills: 0 | DISCHARGE

## 2023-01-31 RX ORDER — PANTOPRAZOLE SODIUM 20 MG/1
0 TABLET, DELAYED RELEASE ORAL
Qty: 0 | Refills: 0 | DISCHARGE

## 2023-01-31 RX ORDER — ZINC SULFATE TAB 220 MG (50 MG ZINC EQUIVALENT) 220 (50 ZN) MG
1 TAB ORAL
Qty: 0 | Refills: 0 | DISCHARGE

## 2023-01-31 NOTE — H&P PST ADULT - BLOOD TRANSFUSION, PREVIOUS, PROFILE
H/x of anemia, pt does not remember when/yes H/x of anemia, pt does not remember when she had the blood transfusion/yes

## 2023-01-31 NOTE — H&P PST ADULT - HISTORY OF PRESENT ILLNESS
PMH of Afib (not on AC for hx of multiple falls), T2DM, HTN, HLD, ESRD on HD (MWF), CHF, CAD s/p CABG, PAD S/P R-->L bifem-fem BK pop bypass, recent fempop bypass (6/21/2022), and partial ray amputation right great toe (6/22/2022) 63 y/o female with PMH of Afib (not on AC for hx of multiple falls), T2DM, HTN, HLD, ESRD on HD (M,Tues,F), CHF, CAD s/p CABG, PAD S/P R-->L bifem-fem BK pop bypass, recent fempop bypass (6/21/2022), and partial ray amputation right great toe (6/22/2022) here for PST. Pt still with open wound with white appearance with delayed healing. Pt denies pain of right foot. Pt with unsteady gait and mostly wheelchair bound. Pt electing for right lower extremity with anesthesia on 2/7/2023.  61 y/o female with PMH of Afib (not on AC for hx of multiple falls), T2DM, HTN, HLD, ESRD on HD (M,Tues,F), CHF, CAD s/p CABG, PAD S/P R-->L bifem-fem BK pop bypass, recent fempop bypass (6/21/2022), and partial ray amputation right great toe (6/22/2022) here for PST. Pt still with open wound with delayed healing. Pt denies pain of right foot. Pt with unsteady gait and mostly wheelchair bound. Pt electing for right lower extremity with anesthesia on 2/7/2023.

## 2023-01-31 NOTE — H&P PST ADULT - NSICDXFAMILYHX_GEN_ALL_CORE_FT
FAMILY HISTORY:  Father  Still living? No  FH: myocardial infarction, Age at diagnosis: Age Unknown  FH: myocardial infarction, Age at diagnosis: Age Unknown    Sibling  Still living? No  Family history of type 2 diabetes mellitus in brother, Age at diagnosis: Age Unknown

## 2023-01-31 NOTE — H&P PST ADULT - NSICDXPASTMEDICALHX_GEN_ALL_CORE_FT
PAST MEDICAL HISTORY:  Anemia secondary to renal failure     Ataxia     CAD (coronary artery disease)     CRF (chronic renal failure), unspecified stage     Depression     Dialysis patient     ESRD on dialysis     Falls     h/o Anxiety attack     h/o Hepatitis A 1969 currently resolved, no symptoms    h/o Myocardial infarct 2007     h/o Smoking quitted 3/2012    HTN (hypertension)     HTN (hypertension)     Murmur, cardiac     Osteomyelitis     PAD (peripheral artery disease)     Type 2 diabetes mellitus      PAST MEDICAL HISTORY:  Anemia secondary to renal failure     Ataxia     CAD (coronary artery disease)     Depression     ESRD on dialysis     Falls     h/o Anxiety attack     h/o Hepatitis A 1969 currently resolved, no symptoms    h/o Myocardial infarct 2007     h/o Smoking quitted 3/2012    HTN (hypertension)     Murmur, cardiac     Osteomyelitis     Peripheral vascular disease, unspecified     Type 2 diabetes mellitus      PAST MEDICAL HISTORY:  Anemia secondary to renal failure     Ataxia     CAD (coronary artery disease)     Depression     ESRD on dialysis     Falls     h/o Anxiety attack     h/o Hepatitis A 1969 currently resolved, no symptoms    h/o Myocardial infarct 2007     h/o Smoking quitted 3/2012    HTN (hypertension)     Murmur, cardiac     Peripheral vascular disease, unspecified     Type 2 diabetes mellitus

## 2023-01-31 NOTE — H&P PST ADULT - PROBLEM SELECTOR PLAN 1
Cardiac clearance needed. Instructed pt to continue baby aspirin until night before surgery. Pt verbalized understanding.

## 2023-01-31 NOTE — H&P PST ADULT - PROBLEM SELECTOR PLAN 4
CBC, Comprehensive panel, PT/PTT ordered. EKG and CXR from 9/12/22 in chart. Pre-op instructions and surgical scrubs given and pt verbalized understanding.

## 2023-01-31 NOTE — H&P PST ADULT - PROBLEM SELECTOR PLAN 3
Instructed pt of need for endocrine clearance if HgbA1c is 9 or greater. Take 16 units of Glargine night before surgery. No insulin morning of surgery. Pt verbalized understanding.

## 2023-01-31 NOTE — H&P PST ADULT - MUSCULOSKELETAL COMMENTS
(+) unsteady gait, mostly wheelchair bound Right 1st digit - amputated, wound dehiscence with white appearance

## 2023-02-08 NOTE — CONSULT NOTE ADULT - PROBLEM SELECTOR RECOMMENDATION 9
add lantus 8 units qhs  cont admelog corrective scale coverage qac/qhs  cont cons cho diet  goal bg 100-180 in hosp setting
Patient was seen and evaluated  labs reviewed   Recommend MRI b/l foot  Start IV abx  Recommend ID consult  Vascular on baord  Possible surgical intervention, pending Vascular recommendation and MRI result.
Calcipotriene Pregnancy And Lactation Text: This medication has not been proven safe during pregnancy. It is unknown if this medication is excreted in breast milk.

## 2023-02-18 PROBLEM — I25.10 ATHEROSCLEROTIC HEART DISEASE OF NATIVE CORONARY ARTERY WITHOUT ANGINA PECTORIS: Chronic | Status: ACTIVE | Noted: 2023-01-31

## 2023-02-18 PROBLEM — E11.9 TYPE 2 DIABETES MELLITUS WITHOUT COMPLICATIONS: Chronic | Status: ACTIVE | Noted: 2023-01-31

## 2023-02-18 PROBLEM — I73.9 PERIPHERAL VASCULAR DISEASE, UNSPECIFIED: Chronic | Status: ACTIVE | Noted: 2023-01-31

## 2023-02-22 ENCOUNTER — INPATIENT (INPATIENT)
Facility: HOSPITAL | Age: 73
LOS: 2 days | Discharge: HOME | DRG: 640 | End: 2023-02-25
Attending: INTERNAL MEDICINE | Admitting: INTERNAL MEDICINE
Payer: MEDICARE

## 2023-02-22 VITALS
HEIGHT: 69 IN | HEART RATE: 72 BPM | RESPIRATION RATE: 16 BRPM | WEIGHT: 117.07 LBS | SYSTOLIC BLOOD PRESSURE: 132 MMHG | DIASTOLIC BLOOD PRESSURE: 77 MMHG | OXYGEN SATURATION: 100 % | TEMPERATURE: 97 F

## 2023-02-22 DIAGNOSIS — Z89.411 ACQUIRED ABSENCE OF RIGHT GREAT TOE: Chronic | ICD-10-CM

## 2023-02-22 DIAGNOSIS — N18.6 END STAGE RENAL DISEASE: ICD-10-CM

## 2023-02-22 DIAGNOSIS — E87.5 HYPERKALEMIA: ICD-10-CM

## 2023-02-22 DIAGNOSIS — I10 ESSENTIAL (PRIMARY) HYPERTENSION: ICD-10-CM

## 2023-02-22 DIAGNOSIS — I50.42 CHRONIC COMBINED SYSTOLIC (CONGESTIVE) AND DIASTOLIC (CONGESTIVE) HEART FAILURE: ICD-10-CM

## 2023-02-22 DIAGNOSIS — I25.10 ATHEROSCLEROTIC HEART DISEASE OF NATIVE CORONARY ARTERY WITHOUT ANGINA PECTORIS: ICD-10-CM

## 2023-02-22 DIAGNOSIS — E11.9 TYPE 2 DIABETES MELLITUS WITHOUT COMPLICATIONS: ICD-10-CM

## 2023-02-22 DIAGNOSIS — Z29.9 ENCOUNTER FOR PROPHYLACTIC MEASURES, UNSPECIFIED: ICD-10-CM

## 2023-02-22 DIAGNOSIS — I73.9 PERIPHERAL VASCULAR DISEASE, UNSPECIFIED: ICD-10-CM

## 2023-02-22 DIAGNOSIS — N18.9 CHRONIC KIDNEY DISEASE, UNSPECIFIED: ICD-10-CM

## 2023-02-22 DIAGNOSIS — T82.590A OTHER MECHANICAL COMPLICATION OF SURGICALLY CREATED ARTERIOVENOUS FISTULA, INITIAL ENCOUNTER: ICD-10-CM

## 2023-02-22 DIAGNOSIS — I48.91 UNSPECIFIED ATRIAL FIBRILLATION: ICD-10-CM

## 2023-02-22 LAB
ALBUMIN SERPL ELPH-MCNC: 3.8 G/DL — SIGNIFICANT CHANGE UP (ref 3.3–5)
ALP SERPL-CCNC: 153 U/L — HIGH (ref 40–120)
ALT FLD-CCNC: 19 U/L — SIGNIFICANT CHANGE UP (ref 12–78)
ANION GAP SERPL CALC-SCNC: 10 MMOL/L — SIGNIFICANT CHANGE UP (ref 5–17)
APTT BLD: 28.2 SEC — SIGNIFICANT CHANGE UP (ref 27.5–35.5)
AST SERPL-CCNC: 12 U/L — LOW (ref 15–37)
BASE EXCESS BLDA CALC-SCNC: 2.5 MMOL/L — SIGNIFICANT CHANGE UP (ref -2–3)
BASOPHILS # BLD AUTO: 0.09 K/UL — SIGNIFICANT CHANGE UP (ref 0–0.2)
BASOPHILS NFR BLD AUTO: 1.1 % — SIGNIFICANT CHANGE UP (ref 0–2)
BILIRUB SERPL-MCNC: 0.4 MG/DL — SIGNIFICANT CHANGE UP (ref 0.2–1.2)
BLOOD GAS COMMENTS ARTERIAL: SIGNIFICANT CHANGE UP
BUN SERPL-MCNC: 81 MG/DL — HIGH (ref 7–23)
CALCIUM SERPL-MCNC: 10.1 MG/DL — SIGNIFICANT CHANGE UP (ref 8.5–10.1)
CHLORIDE SERPL-SCNC: 96 MMOL/L — SIGNIFICANT CHANGE UP (ref 96–108)
CO2 SERPL-SCNC: 26 MMOL/L — SIGNIFICANT CHANGE UP (ref 22–31)
CREAT SERPL-MCNC: 8.6 MG/DL — HIGH (ref 0.5–1.3)
EGFR: 4 ML/MIN/1.73M2 — LOW
EOSINOPHIL # BLD AUTO: 0.27 K/UL — SIGNIFICANT CHANGE UP (ref 0–0.5)
EOSINOPHIL NFR BLD AUTO: 3.3 % — SIGNIFICANT CHANGE UP (ref 0–6)
FLUAV AG NPH QL: SIGNIFICANT CHANGE UP
FLUBV AG NPH QL: SIGNIFICANT CHANGE UP
GLUCOSE SERPL-MCNC: 172 MG/DL — HIGH (ref 70–99)
HCO3 BLDA-SCNC: 27 MMOL/L — SIGNIFICANT CHANGE UP (ref 21–28)
HCT VFR BLD CALC: 48.4 % — HIGH (ref 34.5–45)
HGB BLD-MCNC: 15.3 G/DL — SIGNIFICANT CHANGE UP (ref 11.5–15.5)
IMM GRANULOCYTES NFR BLD AUTO: 0 % — SIGNIFICANT CHANGE UP (ref 0–0.9)
INR BLD: 0.91 RATIO — SIGNIFICANT CHANGE UP (ref 0.88–1.16)
LYMPHOCYTES # BLD AUTO: 1.93 K/UL — SIGNIFICANT CHANGE UP (ref 1–3.3)
LYMPHOCYTES # BLD AUTO: 23.5 % — SIGNIFICANT CHANGE UP (ref 13–44)
MCHC RBC-ENTMCNC: 30.7 PG — SIGNIFICANT CHANGE UP (ref 27–34)
MCHC RBC-ENTMCNC: 31.6 GM/DL — LOW (ref 32–36)
MCV RBC AUTO: 97 FL — SIGNIFICANT CHANGE UP (ref 80–100)
MONOCYTES # BLD AUTO: 1.15 K/UL — HIGH (ref 0–0.9)
MONOCYTES NFR BLD AUTO: 14 % — SIGNIFICANT CHANGE UP (ref 2–14)
NEUTROPHILS # BLD AUTO: 4.73 K/UL — SIGNIFICANT CHANGE UP (ref 1.8–7.4)
NEUTROPHILS NFR BLD AUTO: 57.6 % — SIGNIFICANT CHANGE UP (ref 43–77)
PCO2 BLDA: 44 MMHG — HIGH (ref 32–35)
PH BLDA: 7.4 — SIGNIFICANT CHANGE UP (ref 7.35–7.45)
PLATELET # BLD AUTO: 198 K/UL — SIGNIFICANT CHANGE UP (ref 150–400)
PO2 BLDA: 89 MMHG — SIGNIFICANT CHANGE UP (ref 83–108)
POTASSIUM SERPL-MCNC: 6.6 MMOL/L — CRITICAL HIGH (ref 3.5–5.3)
POTASSIUM SERPL-SCNC: 6.6 MMOL/L — CRITICAL HIGH (ref 3.5–5.3)
PROT SERPL-MCNC: 7.9 G/DL — SIGNIFICANT CHANGE UP (ref 6–8.3)
PROTHROM AB SERPL-ACNC: 10.6 SEC — SIGNIFICANT CHANGE UP (ref 10.5–13.4)
RBC # BLD: 4.99 M/UL — SIGNIFICANT CHANGE UP (ref 3.8–5.2)
RBC # FLD: 13.5 % — SIGNIFICANT CHANGE UP (ref 10.3–14.5)
RSV RNA NPH QL NAA+NON-PROBE: SIGNIFICANT CHANGE UP
SAO2 % BLDA: 99.3 % — HIGH (ref 94–98)
SARS-COV-2 RNA SPEC QL NAA+PROBE: SIGNIFICANT CHANGE UP
SODIUM SERPL-SCNC: 132 MMOL/L — LOW (ref 135–145)
WBC # BLD: 8.21 K/UL — SIGNIFICANT CHANGE UP (ref 3.8–10.5)
WBC # FLD AUTO: 8.21 K/UL — SIGNIFICANT CHANGE UP (ref 3.8–10.5)

## 2023-02-22 PROCEDURE — 99285 EMERGENCY DEPT VISIT HI MDM: CPT | Mod: FS

## 2023-02-22 PROCEDURE — 99223 1ST HOSP IP/OBS HIGH 75: CPT

## 2023-02-22 PROCEDURE — 71045 X-RAY EXAM CHEST 1 VIEW: CPT | Mod: 26

## 2023-02-22 PROCEDURE — 93010 ELECTROCARDIOGRAM REPORT: CPT

## 2023-02-22 DEVICE — SURGICEL FIBRILLAR 2 X 4": Type: IMPLANTABLE DEVICE | Site: RIGHT | Status: FUNCTIONAL

## 2023-02-22 DEVICE — SURGIFOAM PAD 8CM X 12.5CM X 10MM (100): Type: IMPLANTABLE DEVICE | Site: RIGHT | Status: FUNCTIONAL

## 2023-02-22 DEVICE — LIGATING CLIPS WECK HORIZON SMALL-WIDE (RED) 24: Type: IMPLANTABLE DEVICE | Site: RIGHT | Status: FUNCTIONAL

## 2023-02-22 RX ORDER — SODIUM POLYSTYRENE SULFONATE 4.1 MEQ/G
30 POWDER, FOR SUSPENSION ORAL ONCE
Refills: 0 | Status: DISCONTINUED | OUTPATIENT
Start: 2023-02-22 | End: 2023-02-22

## 2023-02-22 RX ORDER — SENNA PLUS 8.6 MG/1
1 TABLET ORAL DAILY
Refills: 0 | Status: DISCONTINUED | OUTPATIENT
Start: 2023-02-22 | End: 2023-02-25

## 2023-02-22 RX ORDER — GLUCAGON INJECTION, SOLUTION 0.5 MG/.1ML
1 INJECTION, SOLUTION SUBCUTANEOUS ONCE
Refills: 0 | Status: DISCONTINUED | OUTPATIENT
Start: 2023-02-22 | End: 2023-02-25

## 2023-02-22 RX ORDER — RISPERIDONE 4 MG/1
0.5 TABLET ORAL DAILY
Refills: 0 | Status: DISCONTINUED | OUTPATIENT
Start: 2023-02-22 | End: 2023-02-25

## 2023-02-22 RX ORDER — LANOLIN ALCOHOL/MO/W.PET/CERES
3 CREAM (GRAM) TOPICAL AT BEDTIME
Refills: 0 | Status: DISCONTINUED | OUTPATIENT
Start: 2023-02-22 | End: 2023-02-25

## 2023-02-22 RX ORDER — ACETAMINOPHEN 500 MG
650 TABLET ORAL EVERY 6 HOURS
Refills: 0 | Status: DISCONTINUED | OUTPATIENT
Start: 2023-02-22 | End: 2023-02-25

## 2023-02-22 RX ORDER — DEXTROSE 50 % IN WATER 50 %
25 SYRINGE (ML) INTRAVENOUS ONCE
Refills: 0 | Status: DISCONTINUED | OUTPATIENT
Start: 2023-02-22 | End: 2023-02-25

## 2023-02-22 RX ORDER — DEXTROSE 50 % IN WATER 50 %
15 SYRINGE (ML) INTRAVENOUS ONCE
Refills: 0 | Status: DISCONTINUED | OUTPATIENT
Start: 2023-02-22 | End: 2023-02-25

## 2023-02-22 RX ORDER — INSULIN HUMAN 100 [IU]/ML
10 INJECTION, SOLUTION SUBCUTANEOUS ONCE
Refills: 0 | Status: COMPLETED | OUTPATIENT
Start: 2023-02-22 | End: 2023-02-22

## 2023-02-22 RX ORDER — SODIUM ZIRCONIUM CYCLOSILICATE 10 G/10G
10 POWDER, FOR SUSPENSION ORAL ONCE
Refills: 0 | Status: COMPLETED | OUTPATIENT
Start: 2023-02-22 | End: 2023-02-22

## 2023-02-22 RX ORDER — INSULIN LISPRO 100/ML
VIAL (ML) SUBCUTANEOUS
Refills: 0 | Status: DISCONTINUED | OUTPATIENT
Start: 2023-02-22 | End: 2023-02-25

## 2023-02-22 RX ORDER — ATORVASTATIN CALCIUM 80 MG/1
40 TABLET, FILM COATED ORAL AT BEDTIME
Refills: 0 | Status: DISCONTINUED | OUTPATIENT
Start: 2023-02-22 | End: 2023-02-25

## 2023-02-22 RX ORDER — METOPROLOL TARTRATE 50 MG
100 TABLET ORAL AT BEDTIME
Refills: 0 | Status: DISCONTINUED | OUTPATIENT
Start: 2023-02-22 | End: 2023-02-25

## 2023-02-22 RX ORDER — DEXTROSE 50 % IN WATER 50 %
12.5 SYRINGE (ML) INTRAVENOUS ONCE
Refills: 0 | Status: DISCONTINUED | OUTPATIENT
Start: 2023-02-22 | End: 2023-02-25

## 2023-02-22 RX ORDER — DEXTROSE 50 % IN WATER 50 %
25 SYRINGE (ML) INTRAVENOUS ONCE
Refills: 0 | Status: COMPLETED | OUTPATIENT
Start: 2023-02-22 | End: 2023-02-22

## 2023-02-22 RX ORDER — PANTOPRAZOLE SODIUM 20 MG/1
40 TABLET, DELAYED RELEASE ORAL
Refills: 0 | Status: DISCONTINUED | OUTPATIENT
Start: 2023-02-22 | End: 2023-02-25

## 2023-02-22 RX ORDER — SODIUM CHLORIDE 9 MG/ML
1000 INJECTION, SOLUTION INTRAVENOUS
Refills: 0 | Status: DISCONTINUED | OUTPATIENT
Start: 2023-02-22 | End: 2023-02-25

## 2023-02-22 RX ORDER — SODIUM ZIRCONIUM CYCLOSILICATE 10 G/10G
10 POWDER, FOR SUSPENSION ORAL DAILY
Refills: 0 | Status: DISCONTINUED | OUTPATIENT
Start: 2023-02-22 | End: 2023-02-25

## 2023-02-22 RX ORDER — HEPARIN SODIUM 5000 [USP'U]/ML
5000 INJECTION INTRAVENOUS; SUBCUTANEOUS EVERY 12 HOURS
Refills: 0 | Status: DISCONTINUED | OUTPATIENT
Start: 2023-02-22 | End: 2023-02-25

## 2023-02-22 RX ORDER — ONDANSETRON 8 MG/1
4 TABLET, FILM COATED ORAL EVERY 8 HOURS
Refills: 0 | Status: DISCONTINUED | OUTPATIENT
Start: 2023-02-22 | End: 2023-02-25

## 2023-02-22 RX ORDER — ASPIRIN/CALCIUM CARB/MAGNESIUM 324 MG
81 TABLET ORAL DAILY
Refills: 0 | Status: DISCONTINUED | OUTPATIENT
Start: 2023-02-22 | End: 2023-02-25

## 2023-02-22 RX ADMIN — HEPARIN SODIUM 5000 UNIT(S): 5000 INJECTION INTRAVENOUS; SUBCUTANEOUS at 17:45

## 2023-02-22 RX ADMIN — Medication 0.1 MILLIGRAM(S): at 17:43

## 2023-02-22 RX ADMIN — SODIUM ZIRCONIUM CYCLOSILICATE 10 GRAM(S): 10 POWDER, FOR SUSPENSION ORAL at 17:45

## 2023-02-22 RX ADMIN — Medication 3 MILLIGRAM(S): at 22:06

## 2023-02-22 RX ADMIN — Medication 25 GRAM(S): at 17:46

## 2023-02-22 RX ADMIN — Medication 100 MILLIGRAM(S): at 22:06

## 2023-02-22 RX ADMIN — PANTOPRAZOLE SODIUM 40 MILLIGRAM(S): 20 TABLET, DELAYED RELEASE ORAL at 17:42

## 2023-02-22 RX ADMIN — INSULIN HUMAN 10 UNIT(S): 100 INJECTION, SOLUTION SUBCUTANEOUS at 17:59

## 2023-02-22 RX ADMIN — Medication 81 MILLIGRAM(S): at 18:43

## 2023-02-22 RX ADMIN — Medication 1 TABLET(S): at 17:58

## 2023-02-22 RX ADMIN — ATORVASTATIN CALCIUM 40 MILLIGRAM(S): 80 TABLET, FILM COATED ORAL at 22:05

## 2023-02-22 NOTE — PATIENT PROFILE ADULT - FALL HARM RISK - HARM RISK INTERVENTIONS
Assistance with ambulation/Assistance OOB with selected safe patient handling equipment/Communicate Risk of Fall with Harm to all staff/Discuss with provider need for PT consult/Monitor for mental status changes/Monitor gait and stability/Move patient closer to nurses' station/Provide patient with walking aids - walker, cane, crutches/Reinforce activity limits and safety measures with patient and family/Reorient to person, place and time as needed/Tailored Fall Risk Interventions/Toileting schedule using arm’s reach rule for commode and bathroom/Use of alarms - bed, chair and/or voice tab/Visual Cue: Yellow wristband and red socks/Bed in lowest position, wheels locked, appropriate side rails in place/Call bell, personal items and telephone in reach/Instruct patient to call for assistance before getting out of bed or chair/Non-slip footwear when patient is out of bed/[place] to call system/Physically safe environment - no spills, clutter or unnecessary equipment/Purposeful Proactive Rounding/Room/bathroom lighting operational, light cord in reach

## 2023-02-22 NOTE — CONSULT NOTE ADULT - SUBJECTIVE AND OBJECTIVE BOX
Patient is a 72y Female whom presented to the hospital with     PAST MEDICAL & SURGICAL HISTORY:  HTN (hypertension)      h/o Anxiety attack      Depression      h/o Myocardial infarct 2007      h/o Hepatitis A 1969  currently resolved, no symptoms      Murmur, cardiac      h/o Smoking  quitted 3/2012      Anemia secondary to renal failure      ESRD on dialysis      Falls      Ataxia      Type 2 diabetes mellitus      Peripheral vascular disease, unspecified      CAD (coronary artery disease)      coronary stent 2007      s/p Ovarian cyst removal      s/p surgical removal of benign Skin lesion epigastric area      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 1000 milliLiter(s) (50 mL/Hr) IV Continuous <Continuous>  dextrose 5%. 1000 milliLiter(s) (100 mL/Hr) IV Continuous <Continuous>  dextrose 50% Injectable 25 Gram(s) IV Push once  dextrose 50% Injectable 12.5 Gram(s) IV Push once  dextrose 50% Injectable 25 Gram(s) IV Push once  glucagon  Injectable 1 milliGRAM(s) IntraMuscular once  heparin   Injectable 5000 Unit(s) SubCutaneous every 12 hours  imipramine 50 milliGRAM(s) Oral daily  insulin lispro (ADMELOG) corrective regimen sliding scale   SubCutaneous three times a day before meals  melatonin 3 milliGRAM(s) Oral at bedtime  metoprolol tartrate 100 milliGRAM(s) Oral at bedtime  multivitamin 1 Tablet(s) Oral daily  pantoprazole    Tablet 40 milliGRAM(s) Oral before breakfast  risperiDONE   Tablet 0.5 milliGRAM(s) Oral daily  senna 1 Tablet(s) Oral daily      Allergies    latex (Hives)  No Known Drug Allergies    Intolerances        SOCIAL HISTORY:  Denies ETOh,Smoking,     FAMILY HISTORY:  FH: myocardial infarction (Father)    FH: myocardial infarction (Father)    Family history of type 2 diabetes mellitus in brother (Sibling)        REVIEW OF SYSTEMS:    CONSTITUTIONAL: No weakness, fevers or chills  EYES/ENT: No visual changes;  no throat pain   NECK: No pain or stiffness  RESPIRATORY: No cough, wheezing, hemoptysis; No shortness of breath  CARDIOVASCULAR: No chest pain or palpitations  GASTROINTESTINAL: No abdominal or epigastric pain. No nausea, vomiting,     No diarrhea or constipation. No melena   GENITOURINARY: No dysuria, frequency or hematuria  NEUROLOGICAL: No numbness or weakness  SKIN: dry      VITAL:  T(C): , Max: 36.7 (02-22-23 @ 12:51)  T(F): , Max: 98.1 (02-22-23 @ 12:51)  HR: 75 (02-22-23 @ 17:50)  BP: 145/67 (02-22-23 @ 17:50)  BP(mean): --  RR: 18 (02-22-23 @ 17:50)  SpO2: 97% (02-22-23 @ 17:50)  Wt(kg): --    I and O's:    Height (cm): 175.3 (02-22 @ 15:37)  Weight (kg): 53.1 (02-22 @ 15:37), 54 (02-22 @ 14:33)  BMI (kg/m2): 17.3 (02-22 @ 15:37)  BSA (m2): 1.65 (02-22 @ 15:37)    PHYSICAL EXAM:    Constitutional: NAD  HEENT: conjunctive   clear   Neck:  No JVD  Respiratory: CTAB  Cardiovascular: S1 and S2  Gastrointestinal: BS+, soft, NT/ND  Extremities: No peripheral edema  Neurological: A/O x 3, no focal deficits  Psychiatric: Normal mood, normal affect  : No Renteria  Skin: No rashes  Access: Not applicable    LABS:    02-22    x   |  x   |  x   ----------------------------<  x   6.5<HH>   |  x   |  x     Ca    10.2<H>      22 Feb 2023 13:20        Urine Studies:          RADIOLOGY & ADDITIONAL STUDIES:                   Patient is a 72y Female whom presented to the hospital with esrd on hd     PAST MEDICAL & SURGICAL HISTORY:  HTN (hypertension)      h/o Anxiety attack      Depression      h/o Myocardial infarct 2007      h/o Hepatitis A 1969  currently resolved, no symptoms      Murmur, cardiac      h/o Smoking  quitted 3/2012      Anemia secondary to renal failure      ESRD on dialysis      Falls      Ataxia      Type 2 diabetes mellitus      Peripheral vascular disease, unspecified      CAD (coronary artery disease)      coronary stent 2007      s/p Ovarian cyst removal      s/p surgical removal of benign Skin lesion epigastric area      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 1000 milliLiter(s) (50 mL/Hr) IV Continuous <Continuous>  dextrose 5%. 1000 milliLiter(s) (100 mL/Hr) IV Continuous <Continuous>  dextrose 50% Injectable 25 Gram(s) IV Push once  dextrose 50% Injectable 12.5 Gram(s) IV Push once  dextrose 50% Injectable 25 Gram(s) IV Push once  glucagon  Injectable 1 milliGRAM(s) IntraMuscular once  heparin   Injectable 5000 Unit(s) SubCutaneous every 12 hours  imipramine 50 milliGRAM(s) Oral daily  insulin lispro (ADMELOG) corrective regimen sliding scale   SubCutaneous three times a day before meals  melatonin 3 milliGRAM(s) Oral at bedtime  metoprolol tartrate 100 milliGRAM(s) Oral at bedtime  multivitamin 1 Tablet(s) Oral daily  pantoprazole    Tablet 40 milliGRAM(s) Oral before breakfast  risperiDONE   Tablet 0.5 milliGRAM(s) Oral daily  senna 1 Tablet(s) Oral daily      Allergies    latex (Hives)  No Known Drug Allergies    Intolerances        SOCIAL HISTORY:  Denies ETOh,Smoking,     FAMILY HISTORY:  FH: myocardial infarction (Father)    FH: myocardial infarction (Father)    Family history of type 2 diabetes mellitus in brother (Sibling)        REVIEW OF SYSTEMS:    CONSTITUTIONAL: No weakness, fevers or chills  RESPIRATORY: No cough, wheezing, hemoptysis; No shortness of breath  CARDIOVASCULAR: No chest pain or palpitations  GASTROINTESTINAL: No abdominal or epigastric pain. No nausea, vomiting,     No diarrhea or constipation. No melena   GENITOURINARY: No dysuria, frequency or hematuria  NEUROLOGICAL: No numbness or weakness  SKIN: dry      VITAL:  T(C): , Max: 36.7 (02-22-23 @ 12:51)  T(F): , Max: 98.1 (02-22-23 @ 12:51)  HR: 75 (02-22-23 @ 17:50)  BP: 145/67 (02-22-23 @ 17:50)  BP(mean): --  RR: 18 (02-22-23 @ 17:50)  SpO2: 97% (02-22-23 @ 17:50)  Wt(kg): --    I and O's:    Height (cm): 175.3 (02-22 @ 15:37)  Weight (kg): 53.1 (02-22 @ 15:37), 54 (02-22 @ 14:33)  BMI (kg/m2): 17.3 (02-22 @ 15:37)  BSA (m2): 1.65 (02-22 @ 15:37)    PHYSICAL EXAM:    Constitutional: NAD  HEENT: conjunctive   clear   Neck:  No JVD  Respiratory: CTAB  Cardiovascular: S1 and S2  Gastrointestinal: BS+, soft, NT/ND  Extremities: No peripheral edema  Neurological: A/O x 3, no focal deficits  Psychiatric: Normal mood, normal affect  : No Renteria  Skin: No rashes  Access: pos fistula     LABS:    02-22    x   |  x   |  x   ----------------------------<  x   6.5<HH>   |  x   |  x     Ca    10.2<H>      22 Feb 2023 13:20        Urine Studies:          RADIOLOGY & ADDITIONAL STUDIES:

## 2023-02-22 NOTE — H&P ADULT - NSICDXPASTMEDICALHX_GEN_ALL_CORE_FT
PAST MEDICAL HISTORY:  Anemia secondary to renal failure     Ataxia     CAD (coronary artery disease)     Depression     ESRD on dialysis     Falls     h/o Anxiety attack     h/o Hepatitis A 1969 currently resolved, no symptoms    h/o Myocardial infarct 2007     h/o Smoking quitted 3/2012    HTN (hypertension)     Murmur, cardiac     Peripheral vascular disease, unspecified     Type 2 diabetes mellitus

## 2023-02-22 NOTE — CONSULT NOTE ADULT - PROBLEM SELECTOR RECOMMENDATION 9
- Hyperkalemia to be addressed - received lokelma & kayexalate in ED  - Nephro consult for dialysis schedule  - Will order duplex of RUE to evaluate fistula  - Tentatively rebooked as an add on for the OR on Friday; will re-evaluate day of  - F/u AM labs  - Discussed w/ Dr. Webster

## 2023-02-22 NOTE — PATIENT PROFILE ADULT - FUNCTIONAL ASSESSMENT - DAILY ACTIVITY ASSESSMENT TYPE
Patient: Keegan Elizondo Date: 2019   : 1949 Attending: Lonnie Givens MD   69 year old male      Chief Complaint: abd pain    Subjective: Patient awake this morning, no complaints of pain today, did not sleep well last night and finally went to sleep at 5 AM    Problem List:   Patient Active Problem List   Diagnosis   • Other secondary thrombocytopenia   • Insomnia   • Redundant prepuce and phimosis   • Pre-operative cardiovascular examination   • Patient has active power of  for health care   • Dementia associated with alcoholism without behavioral disturbance (CMS/HCC)   • Malignant neoplasm of colon (CMS/HCC)   • ETOH abuse       Allergies: ALLERGIES:  No Known Allergies    Medications/Infusions: Reviewed    Review of Systems: All systems reviewed and negative except for those mentioned in the history of present illness                Vital Last Value 24 Hour Range   Temperature 97.4 °F (36.3 °C) (19) Temp  Min: 97.4 °F (36.3 °C)  Max: 98.4 °F (36.9 °C)   Pulse 65 (19) Pulse  Min: 60  Max: 66   Respiratory 16 (19) Resp  Min: 16  Max: 16   Non-Invasive  Blood Pressure 158/76 (19) BP  Min: 148/76  Max: 158/76   Pulse Oximetry 100 % (19) SpO2  Min: 99 %  Max: 100 %     Vital Today Admitted   Weight 71.8 kg (04/15/19 0655) Weight: 72 kg (19)   Height N/A Height: 5' 11\" (180.3 cm) (19)   BMI N/A BMI (Calculated): 22.14 (19)       Intake/Output:      Intake/Output Summary (Last 24 hours) at 2019 0957  Last data filed at 2019 0900  Gross per 24 hour   Intake 880 ml   Output 500 ml   Net 380 ml       Physical Exam:   GENERAL: ,not in distress or pain.   HEENT: atraumatic, normocephalic head.   Pink conjunctivae,anicteric sclerae. No oral thrush   LUNGS: good respiratory effort. Clear to auscultation bilaterally. No wheezes. No crackles.   HEART: Regular rate and rhythm. S1, S2 well heard. No  murmur,no rubs or gallops.   ABDOMEN: Flat, not distended, soft and tender. No guarding, no rigidity.   NEUROLOGIC: no acute focal deficits  SKIN:no Bruise hematoma or rash noted   PSYCHIATRY: normal affect, mood. Speech, memory, concentration      Laboratory Results:   Recent Labs   Lab 04/23/19  0507 04/22/19  0505   WBC 5.6 6.3   HCT 24.6* 24.3*   HGB 8.1* 7.9*   PLT 78* 76*   SODIUM  --  145   POTASSIUM  --  3.7   CHLORIDE  --  115*   CO2  --  24   CALCIUM  --  8.1*   GLUCOSE  --  219*   BUN  --  13   CREATININE  --  0.98   GFRNA  --  77       Imaging: No results found.      DVT/VTE Prophylaxis:  VTE Pharmacologic Prophylaxis: No pharmacologic Venous Thromboembolism prophylaxis due to Platelet count less than 50,000  VTE Mechanical Prophylaxis: Yes    Assessment & Plans/Recommendations:   70 yo M with h/o diabetes mellitus, hypertension, hyperlipidemia, alcohol abuse, colon cancer s/p laparoscopic subtotal colectomy, admitted from PCPs office  1. Alcohol abuse with intoxication: CIWA procotol  2.Transaminitis: h/o alcohol abuse, hepatitis C.   3. Colon cancer s/p partial colectomy: recent elevated CEA, unable to obtain consent from POA, colonoscopy cancelled  4. Diabetes mellitus: Continue metformin, start sliding scale insulin, monitor blood sugars.   5. Hyperlipidemia: Hold statin due to transaminitis.  6. Cognitive impairment: POA activated in Feb, neuropsych tresting and plan to keep activated at this time. Unable to get in touch with POA after multiple attempts.   7. Chronic thrombocytopenia: Hold aspirin, due to alcoholic liver disease, and hypersplenism.  8. Hypertension: on metoprolol  9. Abdominal pain: CT abdomen with ileus, chronic pancreatitis.     Left message for POA to further discuss workup needed, including colonoscopy    No DVT prophylaxis due to thrombocytopenia      Discharge    DONELL Expected Discharge Date: 04/17/19  Barriers: needs colonoscopy  Destination: TBD        Contact the Hospitalist  caring for the patient until 7:00pm. From 7:00pm to 7:00 am contact the Hospitalist on call       Admission

## 2023-02-22 NOTE — CONSULT NOTE ADULT - ASSESSMENT
pt with hyperkalemia- management as per renal   esrd - on hd  ashd   s/p mi  s/p coronary stent  s/p cabg  pvd- s/p bypass  hypertension  dyslipidemia  dm2  anemia-  no angina - no chf -after correction hyperkalemia- pt's cardisac status stable low  risk for repair of a- v fistula

## 2023-02-22 NOTE — H&P ADULT - NSHPLABSRESULTS_GEN_ALL_CORE
< from: TTE Echo Complete w/o Contrast w/ Doppler (04.08.22 @ 10:58) >    FINDINGS  Left Ventricle: Left ventricle was of normal size, moderate LV systolic   dysfunction EF 45%  Aortic Valve: Aortic sclerosis  Mitral Valve: Moderate mitral regurgitation  Tricuspid Valve: Trace tricuspid regurgitation  Pulmonic Valve: Normal  Left Atrium: Normal  Right Ventricle: Normal  Right Atrium: Normal  Diastolic Function: Normal  Pericardium/Pleura: Normal      IMPRESSION:  Left ventricle was of normal size, moderate LV systolic dysfunction EF 45%  Aortic sclerosis  Moderate mitral regurgitation  Trace tricuspid regurgitation    < end of copied text >        < end of copied text >

## 2023-02-22 NOTE — H&P ADULT - ASSESSMENT
71 year old female with PMH of Afib (not on AC for hx of multiple falls), T2DM, HTN, HLD, ESRD on HD (MWF), CHF, CAD s/p CABG, PAD S/P R-->L bifem-fem BK pop bypass, recent fempop bypass (6/21/2022), and partial ray amputation right great toe (6/22/2022) presented today from [facility] for fistulogram arranged by vascular sx , patient was not able to get intervention since she was found to be hyperkalemic K 6.5 and admitted for nephrology evaluation .Seen by cardiology cons .  Patient had podiatry sx surgery during previous hopsital admissions   Right hallux -Acute and chronic osteomyelitis. -Gangrenous skin and soft tissue. 2. Right first metatarsal/clean margin: -Minimal chronic osteomyelitis. Patient was on iv vancomycin with dialysis at [facility] and was followed by ID & PODIATRY at [facility]

## 2023-02-22 NOTE — ED PROVIDER NOTE - CONSTITUTIONAL, MLM
chronically ill appearing, awake, alert, oriented to person, place, time/situation and in no apparent distress. normal...

## 2023-02-22 NOTE — ED PROVIDER NOTE - CLINICAL SUMMARY MEDICAL DECISION MAKING FREE TEXT BOX
72-year-old female with history of end-stage renal disease on hemodialysis, hypertension, hyperlipidemia, type 2 diabetes, A-fib not on anticoagulation, CHF, CAD status post CABG, PAD presents with has been having difficulty with her right wrist fistula, was scheduled for fistulogram today at [facility].  Upon arrival patient had labs checked, and due to elevated potassium, they were unable to do the procedure.  Patient was then sent to the emergency room.  Patient otherwise in her usual state of health.  No chest pain or shortness of breath.  No fever or chills.  No nausea or vomiting.  Patient is unsure if she had her normal dialysis yesterday or the day before.  No cough/URI.  No known COVID exposures.  Patient previously vaccinated for COVID.  No aggravating or alleviating factors otherwise noted.  No other acute complaints at this time.  Exam: Nontoxic, well-appearing.  CTA BL, W/R/R.  Abdomen soft, nontender, nondistended.  No HSM.  Right wrist fistula with no palpable thrill.  Normal distal strength and sensation equal bilaterally.  2+ pulses.  Normal cap refill.  Normal proximal arm/elbow.  No C/C/E.  No other acute findings on exam.  Chronic kidney failure on dialysis with elevated potassium, otherwise asymptomatic.  We will give meds to reduce potassium, discussed with renal for consultation on urgent or emergent dialysis, admission.

## 2023-02-22 NOTE — ED PROVIDER NOTE - OBJECTIVE STATEMENT
Pt is a 71 year old female with PMH of Afib (not on AC for hx of multiple falls), T2DM, HTN, HLD, ESRD on HD (MWF), CHF, CAD s/p CABG, PAD S/P R-->L bifem-fem BK pop bypass, recent fempop bypass (6/21/2022), and partial ray amputation right great toe (6/22/2022) presented today from [facility] for fistulogram arranged by vascular sx , patient was not able to get intervention since she was found to be hyperkalemic K 6.5 and admitted for nephrology evaluation. Pt states used fistula yesterday denies any chest pain sob nvd fever chills.

## 2023-02-22 NOTE — ED ADULT NURSE NOTE - CHPI ED NUR SYMPTOMS NEG
Next appt 9/20/2022  Last appt 3/15/2022    Refill request for    furosemide (LASIX) 20 MG tablet    Unable to refill per protocol. Order pended and routed to provider to review.   no dizziness/no nausea/no tingling/no vomiting/no weakness

## 2023-02-22 NOTE — ED ADULT NURSE NOTE - OBJECTIVE STATEMENT
pt. is Aox4. arrived to the ER from PACU for having high K of 6.5. Denies HA/SOB/CP. Denies dizziness. Denies palpations. CM in place. Pending radiology and lab results. pt. is Aox4. arrived to the ER from PACU [facility] for having high K of 6.5. Denies HA/SOB/CP. Denies dizziness. Denies palpations. CM in place. Pending radiology and lab results.

## 2023-02-22 NOTE — CONSULT NOTE ADULT - SUBJECTIVE AND OBJECTIVE BOX
Vascular Surgery Consult Note:    CC: Patient is a 72y old  Female who presents with a chief complaint of hyperkalemia (22 Feb 2023 17:23)    HPI From ED: 71 year old female with PMH of Afib (not on AC for hx of multiple falls), T2DM, HTN, HLD, ESRD on HD (MWF), CHF, CAD s/p CABG, PAD S/P R-->L bifem-fem BK pop bypass, recent fempop bypass (6/21/2022), and partial ray amputation right great toe (6/22/2022) presented today from [facility] for fistulogram arranged by vascular sx , patient was not able to get intervention since she was found to be hyperkalemic K 6.5 and admitted for nephrology evaluation .Seen by cardiology cons .  [facility]   Right hallux -Acute and chronic osteomyelitis. -Gangrenous skin and soft tissue. 2. Right first metatarsal/clean margin: -Minimal chronic osteomyelitis. Patient was on iv vancomycin with dialysis at [facility] and was followed by ID & PODIATRY at [facility]  (22 Feb 2023 17:23)    Interval HPI: HPI as written above. Pt was scheduled for a fistulogram today w/ Dr. Webster, however her potassium was persistently elevated after three lab draws. Pt has no complaints at this time, besides for mild mid back pain and insomnia.     Past Medical & Surgical History:  HTN (hypertension)  h/o Anxiety attack  Depression  h/o Myocardial infarct 2007  h/o Hepatitis A 1969  currently resolved, no symptoms  Murmur, cardiac  h/o Smoking  quitted 3/2012  Anemia secondary to renal failure  ESRD on dialysis  Fall  Ataxia  Type 2 diabetes mellitus  Peripheral vascular disease, unspecified  CAD (coronary artery disease)  coronary stent 2007  s/p Ovarian cyst removal  s/p surgical removal of benign Skin lesion epigastric area  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    ROS:  General: denies fatigue/weakness, dizziness, fevers/chills  HEENT: no vertigo, throat pain or dysphagia; denies neck pain/stiffness, swelling/lumps or hoarseness   Respiratory: denies shortness of breath, wheezing  Cardiac: denies chest pain, palpitations  GI: denies abdominal pain, bloating/fullness; no nausea, vomiting  : no urinary complaints  Neuro: denies headache, syncope, paraesthesias, paralysis or tremors  Extremities: denies pain/swelling, arthralgias or weakness  Skin: denies pruritus, rashes  Psych: denies hallucinations, visual disturbances    Medications:  Home Medications:  aspirin 81 mg oral delayed release tablet: 1 tab(s) orally once a day (at bedtime) (22 Feb 2023 14:14)  atorvastatin 40 mg oral tablet: 1 tab(s) orally once a day (at bedtime) (22 Feb 2023 14:14)  CLONIDINE 0.1MG TAB: tab(s) orally 2 times a day (22 Feb 2023 14:14)  imipramine 50 mg oral tablet: 1 tab(s) orally once a day (22 Feb 2023 14:14)  insulin glargine-yfgn 100 units/mL subcutaneous solution: 20 unit(s) subcutaneous once a day (at bedtime) (22 Feb 2023 14:14)  insulin lispro 100 units/mL injectable solution: injectable 3 times a day (before meals) (22 Feb 2023 14:14)  Melatonin 3 mg oral tablet: 1 tab(s) orally once a day (at bedtime), As Needed (22 Feb 2023 14:14)  metoprolol tartrate 100 mg oral tablet: 1 tab(s) orally every 12 hours (22 Feb 2023 14:14)  Nephro-Alfie oral tablet: 1 tab(s) orally once a day (22 Feb 2023 14:14)  PANTOPRAZOLE 40MG DR TAB: tab(s) orally once a day (22 Feb 2023 14:14)  risperiDONE 0.5 mg oral tablet: 1 tab(s) orally once a day (at bedtime) (22 Feb 2023 14:14)  Senna 8.6 mg oral tablet: 1 tab(s) orally once a day (at bedtime) (22 Feb 2023 14:14)    MEDICATIONS  (STANDING):  aspirin enteric coated 81 milliGRAM(s) Oral daily  atorvastatin 40 milliGRAM(s) Oral at bedtime  cloNIDine 0.1 milliGRAM(s) Oral two times a day  dextrose 5%. 1000 milliLiter(s) (50 mL/Hr) IV Continuous <Continuous>  dextrose 5%. 1000 milliLiter(s) (100 mL/Hr) IV Continuous <Continuous>  dextrose 50% Injectable 25 Gram(s) IV Push once  dextrose 50% Injectable 25 Gram(s) IV Push once  dextrose 50% Injectable 12.5 Gram(s) IV Push once  dextrose 50% Injectable 25 Gram(s) IV Push once  glucagon  Injectable 1 milliGRAM(s) IntraMuscular once  heparin   Injectable 5000 Unit(s) SubCutaneous every 12 hours  imipramine 50 milliGRAM(s) Oral daily  insulin lispro (ADMELOG) corrective regimen sliding scale   SubCutaneous three times a day before meals  insulin regular  human recombinant. 10 Unit(s) IV Push once  melatonin 3 milliGRAM(s) Oral at bedtime  metoprolol tartrate 100 milliGRAM(s) Oral at bedtime  multivitamin 1 Tablet(s) Oral daily  pantoprazole    Tablet 40 milliGRAM(s) Oral before breakfast  risperiDONE   Tablet 0.5 milliGRAM(s) Oral daily  senna 1 Tablet(s) Oral daily  sodium zirconium cyclosilicate 10 Gram(s) Oral Once    MEDICATIONS  (PRN):  acetaminophen     Tablet .. 650 milliGRAM(s) Oral every 6 hours PRN Temp greater or equal to 38C (100.4F), Mild Pain (1 - 3)  dextrose Oral Gel 15 Gram(s) Oral once PRN Blood Glucose LESS THAN 70 milliGRAM(s)/deciliter  ondansetron Injectable 4 milliGRAM(s) IV Push every 8 hours PRN Nausea and/or Vomiting    Allergies:  ALLERGIES: latex (Hives)  No Known Drug Allergies  INTOLERANCES: None, unless indicated below    Social History:  [facility] .No ETOH or TOBACCO reported. (22 Feb 2023 17:23)    Family History:  FH: myocardial infarction (Father)  FH: myocardial infarction (Father)  Family history of type 2 diabetes mellitus in brother (Sibling)    Vitals:  Vital Signs Last 24 Hrs  T(C): 36.3 (22 Feb 2023 15:37), Max: 36.7 (22 Feb 2023 12:51)  T(F): 97.3 (22 Feb 2023 15:37), Max: 98.1 (22 Feb 2023 12:51)  HR: 72 (22 Feb 2023 15:37) (68 - 87)  BP: 132/77 (22 Feb 2023 15:37) (132/77 - 147/62)  RR: 16 (22 Feb 2023 15:37) (16 - 18)  SpO2: 100% (22 Feb 2023 15:37) (95% - 100%)    Parameters below as of 22 Feb 2023 15:37  Patient On (Oxygen Delivery Method): room air    Physical Exam:  General: no acute distress, appears comfortable  HEENT: normocephalic/atraumatic, vision grossly intact, hearing grossly intact, ears & nose symmetrical and atraumatic  Neck: supple  Chest: good inspiratory effort  Heart: heart rate and rhythm regular  Abdomen: soft, nontender, nondistended  Extremities: no edema, negative Sofia's sign; RUE fistula - very mild palpable thrill  Neuro: motor and sensory grossly intact  Skin: warm, dry, good turgor    Labs:    02-22    x   |  x   |  x   ----------------------------<  x   6.5<HH>   |  x   |  x     Ca    10.2<H>      22 Feb 2023 13:20

## 2023-02-22 NOTE — CONSULT NOTE ADULT - ASSESSMENT
70 y/o female with PMHx of A-fib (not on AC for hx of multiple falls), T2DM, HTN, HLD, ESRD on HD (MWF), CHF, CAD s/p CABG, PAD S/P R-->L bifem-fem BK pop bypass, recent fempop bypass (6/21/2022), and partial ray amputation right great toe (6/22/2022), now in the ED for hyperkalemia (was initially scheduled for a fistulogram with Dr. Webster today).

## 2023-02-22 NOTE — CONSULT NOTE ADULT - SUBJECTIVE AND OBJECTIVE BOX
Date/Time Patient Seen:  		  Referring MD:   Data Reviewed	       Patient is a 72y old  Female who presents with a chief complaint of hyperkalemia (22 Feb 2023 17:23)      Subjective/HPI   Patient with PMH of Afib (not on AC for hx of multiple falls), T2DM, HTN, HLD, ESRD on HD (MWF), CHF, CAD s/p CABG, PAD S/P R-->L bifem-fem BK pop bypass, recent fempop bypass (6/21/2022), and partial ray amputation right great toe (6/22/2022) presented today from [facility] for fistulogram arranged by vascular sx , patient was not able to get intervention since she was found to be hyperkalemic K 6.5 and admitted for nephrology evaluation.  PAST MEDICAL & SURGICAL HISTORY:  Diabetes mellitus II    HTN (hypertension)    h/o Anxiety attack    Depression    h/o Myocardial infarct 2007    CAD (coronary artery disease)    CAD (coronary artery disease)    h/o Hepatitis A 1969  currently resolved, no symptoms    PAD (peripheral artery disease)    Murmur, cardiac    h/o Smoking  quitted 3/2012    CRF (chronic renal failure), unspecified stage    Dialysis patient    Anemia secondary to renal failure    HTN (hypertension)    Osteomyelitis    ESRD on dialysis    Falls    Ataxia    Type 2 diabetes mellitus    Peripheral vascular disease, unspecified    CAD (coronary artery disease)    coronary stent 2007    s/p Ovarian cyst removal    s/p surgical removal of benign Skin lesion epigastric area    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    FAMILY HISTORY:  Father  Still living? No  FH: myocardial infarction, Age at diagnosis: Age Unknown  FH: myocardial infarction, Age at diagnosis: Age Unknown    Sibling  Still living? No  Family history of type 2 diabetes mellitus in brother, Age at diagnosis: Age Unknown.     Tobacco Usage:  · Tobacco Usage	Unknown if ever smoked    ALLERGIES AND HOME MEDICATIONS:   Allergies:        Allergies:  	No Known Drug Allergies:   	latex: Latex, Hives    Home Medications:   * Patient Currently Takes Medications as of 22-Feb-2023 14:14 documented in Structured Notes  · 	insulin lispro 100 units/mL injectable solution: injectable 3 times a day (before meals)  · 	metoprolol tartrate 100 mg oral tablet: 1 tab(s) orally every 12 hours  · 	aspirin 81 mg oral delayed release tablet: 1 tab(s) orally once a day (at bedtime)  · 	atorvastatin 40 mg oral tablet: 1 tab(s) orally once a day (at bedtime)  · 	insulin glargine-yfgn 100 units/mL subcutaneous solution: 20 unit(s) subcutaneous once a day (at bedtime)  · 	imipramine 50 mg oral tablet: 1 tab(s) orally once a day  · 	Nephro-Alfie oral tablet: 1 tab(s) orally once a day  · 	CLONIDINE 0.1MG TAB: tab(s) orally 2 times a day  · 	PANTOPRAZOLE 40MG DR TAB: tab(s) orally once a day  · 	Senna 8.6 mg oral tablet: 1 tab(s) orally once a day (at bedtime)  · 	Melatonin 3 mg oral tablet: 1 tab(s) orally once a day (at bedtime), As Needed  · 	risperiDONE 0.5 mg oral tablet: 1 tab(s) orally once a day (at bedtime)    REVIEW OF SYSTEMS:    Review of Systems:  · ROS STATEMENT: all other ROS negative except as per HPI    VITAL SIGNS ([place]):    ,,ED ADULT Flow Sheet:    22-Feb-2023 15:21  · COVID-19 Result COVID-19 Result: NEGATIVE  · COVID-19  Test Type COVID-19  Test Type: MOLECULAR PCR  · COVID-19 Ordering Facility COVID-19 Ordering Facility: [facility]  · COVID-19 Result Date/Time COVID-19 Result Date/Time: 18-Feb-2023 21:53    15:37  · Temp (F): 97.3  · Temp (C) Temp (C): 36.3  · Temp site Temp Site: forehead  · Heart Rate Heart Rate (beats/min): 72  · BP Systolic Systolic: 132  · BP Diastolic Diastolic (mm Hg): 77  · Respiration Rate (breaths/min) Respiration Rate (breaths/min): 16  · SpO2 (%) SpO2 (%): 100  · O2 Delivery/Oxygen Delivery Method Patient On (Oxygen Delivery Method): room air  · How was the weight captured? Weight Type/Method: stated  · Dosing Weight (KILOGRAMS) Dosing Weight (KILOGRAMS): 53.1  · Dosing Weight  (POUNDS) Dosing Weight (POUNDS): 117  · Height type Height Type: stated  · Height (FEET) Height (FEET): 5  · Height (INCHES) Height (INCHES): 9        Medication list         MEDICATIONS  (STANDING):  aspirin enteric coated 81 milliGRAM(s) Oral daily  atorvastatin 40 milliGRAM(s) Oral at bedtime  cloNIDine 0.1 milliGRAM(s) Oral two times a day  dextrose 5%. 1000 milliLiter(s) (50 mL/Hr) IV Continuous <Continuous>  dextrose 5%. 1000 milliLiter(s) (100 mL/Hr) IV Continuous <Continuous>  dextrose 50% Injectable 25 Gram(s) IV Push once  dextrose 50% Injectable 12.5 Gram(s) IV Push once  dextrose 50% Injectable 25 Gram(s) IV Push once  glucagon  Injectable 1 milliGRAM(s) IntraMuscular once  heparin   Injectable 5000 Unit(s) SubCutaneous every 12 hours  imipramine 50 milliGRAM(s) Oral daily  insulin lispro (ADMELOG) corrective regimen sliding scale   SubCutaneous three times a day before meals  melatonin 3 milliGRAM(s) Oral at bedtime  metoprolol tartrate 100 milliGRAM(s) Oral at bedtime  multivitamin 1 Tablet(s) Oral daily  pantoprazole    Tablet 40 milliGRAM(s) Oral before breakfast  risperiDONE   Tablet 0.5 milliGRAM(s) Oral daily  senna 1 Tablet(s) Oral daily    MEDICATIONS  (PRN):  acetaminophen     Tablet .. 650 milliGRAM(s) Oral every 6 hours PRN Temp greater or equal to 38C (100.4F), Mild Pain (1 - 3)  dextrose Oral Gel 15 Gram(s) Oral once PRN Blood Glucose LESS THAN 70 milliGRAM(s)/deciliter  ondansetron Injectable 4 milliGRAM(s) IV Push every 8 hours PRN Nausea and/or Vomiting         Vitals log        ICU Vital Signs Last 24 Hrs  T(C): 36.7 (22 Feb 2023 17:50), Max: 36.7 (22 Feb 2023 12:51)  T(F): 98.1 (22 Feb 2023 17:50), Max: 98.1 (22 Feb 2023 12:51)  HR: 75 (22 Feb 2023 17:50) (68 - 87)  BP: 145/67 (22 Feb 2023 17:50) (132/77 - 147/62)  BP(mean): --  ABP: --  ABP(mean): --  RR: 18 (22 Feb 2023 17:50) (16 - 18)  SpO2: 97% (22 Feb 2023 17:50) (95% - 100%)    O2 Parameters below as of 22 Feb 2023 15:37  Patient On (Oxygen Delivery Method): room air                 Input and Output:  I&O's Detail      Lab Data    02-22    x   |  x   |  x   ----------------------------<  x   6.5<HH>   |  x   |  x     Ca    10.2<H>      22 Feb 2023 13:20      ABG - ( 22 Feb 2023 17:35 )  pH, Arterial: 7.40  pH, Blood: x     /  pCO2: 44    /  pO2: 89    / HCO3: 27    / Base Excess: 2.5   /  SaO2: 99.3                    Review of Systems	  weakness      Objective     Physical Examination  heart s1s2  lung dec BS  head nc  awake  alert        Pertinent Lab findings & Imaging      Renteria:  NO   Adequate UO     I&O's Detail           Discussed with:     Cultures:	        Radiology    ACC: [id number] EXAM:  ECHO TTE WO CON COMP W DOPP                          PROCEDURE DATE:  04/08/2022          INTERPRETATION:  Ordering Physician:  AUGIE ALBRIGHT [id number]    Indication: LV function    Quality of study: Good  A complete echocardiographic study was performed utilizing standard   protocol including spectral and color Doppler in all echocardiographic   windows.    Height: 175.3 cm  Weight: 60.4 KG  BSA: 1.74 sq m  Blood Pressure: 162/68   MEASUREMENTS  IVS: 1.1cm  PWT: 1.1cm  LA: 3.1cm  AO: 2.6cm  AV Cusp Separation: cm  LVIDd: 4.9cm  LVIDs: 3.2cm  LVOT: 1.7cm    LVEF: 45%  RVSP: mmHg    FINDINGS  Left Ventricle: Left ventricle was of normal size, moderate LV systolic   dysfunction EF 45%  Aortic Valve: Aortic sclerosis  Mitral Valve: Moderate mitral regurgitation  Tricuspid Valve: Trace tricuspid regurgitation  Pulmonic Valve: Normal  Left Atrium: Normal  Right Ventricle: Normal  Right Atrium: Normal  Diastolic Function: Normal  Pericardium/Pleura: Normal      IMPRESSION:  Left ventricle was of normal size, moderate LV systolic dysfunction EF 45%  Aortic sclerosis  Moderate mitral regurgitation  Trace tricuspid regurgitation    --- End of Report ---             AUGIE ALBRIGHT MD; Attending Cardiologist  This document has been electronically signed. Apr 10 2022  9:31AM

## 2023-02-22 NOTE — CONSULT NOTE ADULT - SUBJECTIVE AND OBJECTIVE BOX
CARDIOLOGY CONSULT NOTE    Patient is a 72y Female with a known history of : admitted with hyperkalemia  pt with esrd - on hd  HPI:      REVIEW OF SYSTEMS:    CONSTITUTIONAL: No fever, weight loss, or fatigue  EYES: No eye pain, visual disturbances, or discharge  ENMT:  No difficulty hearing, tinnitus, vertigo; No sinus or throat pain  NECK: No pain or stiffness  BREASTS: No pain, masses, or nipple discharge  RESPIRATORY: No cough, wheezing, chills or hemoptysis; No shortness of breath  CARDIOVASCULAR: No chest pain, palpitations, dizziness, or leg swelling  GASTROINTESTINAL: No abdominal or epigastric pain. No nausea, vomiting, or hematemesis; No diarrhea or constipation. No melena or hematochezia.  GENITOURINARY: No dysuria, frequency, hematuria, or incontinence  NEUROLOGICAL: No headaches, memory loss, loss of strength, numbness, or tremors  SKIN: No itching, burning, rashes, or lesions   LYMPH NODES: No enlarged glands  ENDOCRINE: No heat or cold intolerance; No hair loss  MUSCULOSKELETAL: No joint pain or swelling; No muscle, back, or extremity pain  PSYCHIATRIC: No depression, anxiety, mood swings, or difficulty sleeping  HEME/LYMPH: No easy bruising, or bleeding gums  ALLERGY AND IMMUNOLOGIC: No hives or eczema    MEDICATIONS  (STANDING):  aspirin enteric coated 81 milliGRAM(s) Oral daily  atorvastatin 40 milliGRAM(s) Oral at bedtime  cloNIDine 0.1 milliGRAM(s) Oral two times a day  dextrose 5%. 1000 milliLiter(s) (50 mL/Hr) IV Continuous <Continuous>  dextrose 5%. 1000 milliLiter(s) (100 mL/Hr) IV Continuous <Continuous>  dextrose 50% Injectable 25 Gram(s) IV Push once  dextrose 50% Injectable 12.5 Gram(s) IV Push once  dextrose 50% Injectable 25 Gram(s) IV Push once  glucagon  Injectable 1 milliGRAM(s) IntraMuscular once  heparin   Injectable 5000 Unit(s) SubCutaneous every 12 hours  imipramine 50 milliGRAM(s) Oral daily  insulin lispro (ADMELOG) corrective regimen sliding scale   SubCutaneous three times a day before meals  melatonin 3 milliGRAM(s) Oral at bedtime  metoprolol tartrate 100 milliGRAM(s) Oral at bedtime  multivitamin 1 Tablet(s) Oral daily  pantoprazole    Tablet 40 milliGRAM(s) Oral before breakfast  risperiDONE   Tablet 0.5 milliGRAM(s) Oral daily  senna 1 Tablet(s) Oral daily    MEDICATIONS  (PRN):  dextrose Oral Gel 15 Gram(s) Oral once PRN Blood Glucose LESS THAN 70 milliGRAM(s)/deciliter      ALLERGIES: latex (Hives)  No Known Drug Allergies      Social History:     FAMILY HISTORY:  FH: myocardial infarction (Father)    FH: myocardial infarction (Father)    Family history of type 2 diabetes mellitus in brother (Sibling)        PAST MEDICAL & SURGICAL HISTORY:  HTN (hypertension)      h/o Anxiety attack      Depression      h/o Myocardial infarct 2007      h/o Hepatitis A 1969  currently resolved, no symptoms      Murmur, cardiac      h/o Smoking  quitted 3/2012      Anemia secondary to renal failure      ESRD on dialysis      Falls      Ataxia      Type 2 diabetes mellitus      Peripheral vascular disease, unspecified      CAD (coronary artery disease)      coronary stent 2007      s/p Ovarian cyst removal      s/p surgical removal of benign Skin lesion epigastric area      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            PHYSICAL EXAMINATION:  -----------------------------  T(C): 36.3 (02-22-23 @ 15:37), Max: 36.7 (02-22-23 @ 12:51)  HR: 72 (02-22-23 @ 15:37) (68 - 87)  BP: 132/77 (02-22-23 @ 15:37) (132/77 - 147/62)  RR: 16 (02-22-23 @ 15:37) (16 - 18)  SpO2: 100% (02-22-23 @ 15:37) (95% - 100%)  Wt(kg): --    Height (cm): 175.3 (02-22 @ 15:37)  Weight (kg): 53.1 (02-22 @ 15:37), 54 (02-22 @ 14:33)  BMI (kg/m2): 17.3 (02-22 @ 15:37)  BSA (m2): 1.65 (02-22 @ 15:37)    Constitutional: well developed, normal appearance, well groomed, well nourished, no deformities and no acute distress.   Eyes: the conjunctiva exhibited no abnormalities and the eyelids demonstrated no xanthelasmas.   HEENT: normal oral mucosa, no oral pallor and no oral cyanosis.   Neck: normal jugular venous A waves present, normal jugular venous V waves present and no jugular venous wilson A waves.   Pulmonary: no respiratory distress, normal respiratory rhythm and effort, no accessory muscle use and lungs were clear to auscultation bilaterally.   Cardiovascular: heart rate and rhythm were normal, normal S1 and S2 and no murmur, gallop, rub, heave or thrill are present.   Abdomen: soft, non-tender, no hepato-splenomegaly and no abdominal mass palpated.   Musculoskeletal: the gait could not be assessed..   Extremities: no clubbing of the fingernails, no localized cyanosis, no petechial hemorrhages and no ischemic changes.   Skin: normal skin color and pigmentation, no rash, no venous stasis, no skin lesions, no skin ulcer and no xanthoma was observed.   Psychiatric: oriented to person, place, and time, the affect was normal, the mood was normal and not feeling anxious.     LABS:   --------  02-22    x   |  x   |  x   ----------------------------<  x   6.5<HH>   |  x   |  x     Ca    10.2<H>      22 Feb 2023 13:20                   RADIOLOGY:  -----------------        ECG:     ECHO

## 2023-02-22 NOTE — CONSULT NOTE ADULT - ASSESSMENT
Patient with PMH of Afib (not on AC for hx of multiple falls), T2DM, HTN, HLD, ESRD on HD (MWF), CHF, CAD s/p CABG, PAD S/P R-->L bifem-fem BK pop bypass, recent fempop bypass (6/21/2022), and partial ray amputation right great toe (6/22/2022) presented today from [facility] for fistulogram arranged by vascular sx , patient was not able to get intervention since she was found to be hyperkalemic K 6.5 and admitted for nephrology evaluation.    esrd  electrolyte imbalance  anemia  falls  ataxic gait  OA  DM  HTN  HLD  CHF  CAD  PAD    blood gas noted  vascular note reviewed  cardio eval   renal eval  cvs rx regimen  correction of Lytes  monitor labs  monitor VS and HD  HD as per renal team  reassurance and supportive care  GOC discussion in progress - pt is unsure on what she wants in term of resuscitation, will follow

## 2023-02-22 NOTE — H&P ADULT - HISTORY OF PRESENT ILLNESS
71 year old female with PMH of Afib (not on AC for hx of multiple falls), T2DM, HTN, HLD, ESRD on HD (MWF), CHF, CAD s/p CABG, PAD S/P R-->L bifem-fem BK pop bypass, recent fempop bypass (6/21/2022), and partial ray amputation right great toe (6/22/2022) presented today from [facility] for fistulogram arranged by vascular sx , patient was not able to get intervention since she was found to be hyperkalemic K 6.5 and admitted for nephrology evaluation .Seen by cardiology cons .  Patient had podiatry sx surgery during previous hopsital admissions   Right hallux -Acute and chronic osteomyelitis. -Gangrenous skin and soft tissue. 2. Right first metatarsal/clean margin: -Minimal chronic osteomyelitis. Patient was on iv vancomycin with dialysis at [facility] and was followed by ID & PODIATRY at [facility]

## 2023-02-22 NOTE — ED PROVIDER NOTE - PROGRESS NOTE DETAILS
spoke with renal dr. Reina advised lokelma insulin/dectrose  seen by vascular tentative surgery scheduled for friday

## 2023-02-22 NOTE — CONSULT NOTE ADULT - CONSULT REASON
Planned fistulogram cancelled 2/2 hyperkalemia
cardiac evaluation
ESRD  HD  Hyperkalemia  GOC
esrd on hd

## 2023-02-22 NOTE — ED ADULT TRIAGE NOTE - CHIEF COMPLAINT QUOTE
pt presents from PST with high potassium. pt due to has sx on fistula today. potassium high, according to Md pt need dialysis prior to sx. presents with a 20g in L AC.

## 2023-02-22 NOTE — CONSULT NOTE ADULT - ASSESSMENT
71 year old female with PMH of Afib (not on AC for hx of multiple falls), T2DM, HTN, HLD, ESRD on HD (MWF), CHF, CAD s/p CABG, PAD S/P R-->L bifem-fem BK pop bypass, recent fempop bypass (6/21/2022), and partial ray amputation right great toe (6/22/2022) presented today from [facility] for fistulogram arranged by vascular sx , patient was not able to get intervention since she was found to be hyperkalemic K 6.5 and admitted for nephrology evaluation .Seen by cardiology cons .  Patient had podiatry sx surgery during previous hopsital admissions   Right hallux -Acute and chronic osteomyelitis. -Gangrenous skin and soft tissue. 2. Right first metatarsal/clean margin: -Minimal chronic osteomyelitis. Patient was on iv vancomycin with dialysis at [facility] and was followed by ID & PODIATRY at [facility]  (22 Feb 2023 17:23)    esrd on hd   Excess fluids and waste products will be removed from your blood; your electrolytes will be balanced; your blood pressure will be controlled.      ANEMIA PLAN:  Anemia of chronic disease:  Well controlled by Aranesp  H and H subtherapeutic .  We will continue Aranesp aiming for a HCT of 32-36 %.   We will monitor Iron stores, B12 and RBC folate .      BP monitoring,continue current antihypertensive meds, low salt diet,followup with PMD in 1-2 weeks     71 year old female with PMH of Afib (not on AC for hx of multiple falls), T2DM, HTN, HLD, ESRD on HD (MWF), CHF, CAD s/p CABG, PAD S/P R-->L bifem-fem BK pop bypass, recent fempop bypass (6/21/2022), and partial ray amputation right great toe (6/22/2022) presented today from [facility] for fistulogram arranged by vascular sx , patient was not able to get intervention since she was found to be hyperkalemic K 6.5 and admitted for nephrology evaluation .Seen by cardiology cons .  [facility]   Right hallux -Acute and chronic osteomyelitis. -Gangrenous skin and soft tissue. 2. Right first metatarsal/clean margin: -Minimal chronic osteomyelitis. Patient was on iv vancomycin with dialysis at [facility] and was followed by ID & PODIATRY at [facility]  (22 Feb 2023 17:23)    esrd on hd   Excess fluids and waste products will be removed from your blood; your electrolytes will be balanced; your blood pressure will be controlled.      ANEMIA PLAN:  Anemia of chronic disease:  Well controlled by Aranesp  H and H subtherapeutic .  We will continue Aranesp aiming for a HCT of 32-36 %.   We will monitor Iron stores, B12 and RBC folate .    hyperkalemia kayexalate   lokelma  repeat k     BP monitoring,continue current antihypertensive meds, low salt diet,followup with PMD in 1-2 weeks

## 2023-02-23 LAB
ALBUMIN SERPL ELPH-MCNC: 3.2 G/DL — LOW (ref 3.3–5)
ALP SERPL-CCNC: 133 U/L — HIGH (ref 40–120)
ALT FLD-CCNC: 20 U/L — SIGNIFICANT CHANGE UP (ref 12–78)
ANION GAP SERPL CALC-SCNC: 12 MMOL/L — SIGNIFICANT CHANGE UP (ref 5–17)
AST SERPL-CCNC: 9 U/L — LOW (ref 15–37)
BASOPHILS # BLD AUTO: 0.05 K/UL — SIGNIFICANT CHANGE UP (ref 0–0.2)
BASOPHILS NFR BLD AUTO: 0.7 % — SIGNIFICANT CHANGE UP (ref 0–2)
BILIRUB SERPL-MCNC: 0.4 MG/DL — SIGNIFICANT CHANGE UP (ref 0.2–1.2)
BUN SERPL-MCNC: 98 MG/DL — HIGH (ref 7–23)
CALCIUM SERPL-MCNC: 9.5 MG/DL — SIGNIFICANT CHANGE UP (ref 8.5–10.1)
CHLORIDE SERPL-SCNC: 95 MMOL/L — LOW (ref 96–108)
CO2 SERPL-SCNC: 24 MMOL/L — SIGNIFICANT CHANGE UP (ref 22–31)
CREAT SERPL-MCNC: 9.6 MG/DL — HIGH (ref 0.5–1.3)
EGFR: 4 ML/MIN/1.73M2 — LOW
EOSINOPHIL # BLD AUTO: 0.21 K/UL — SIGNIFICANT CHANGE UP (ref 0–0.5)
EOSINOPHIL NFR BLD AUTO: 2.7 % — SIGNIFICANT CHANGE UP (ref 0–6)
GLUCOSE SERPL-MCNC: 320 MG/DL — HIGH (ref 70–99)
HCT VFR BLD CALC: 43.2 % — SIGNIFICANT CHANGE UP (ref 34.5–45)
HGB BLD-MCNC: 14 G/DL — SIGNIFICANT CHANGE UP (ref 11.5–15.5)
IMM GRANULOCYTES NFR BLD AUTO: 0.7 % — SIGNIFICANT CHANGE UP (ref 0–0.9)
INR BLD: 0.82 RATIO — LOW (ref 0.88–1.16)
LYMPHOCYTES # BLD AUTO: 1.05 K/UL — SIGNIFICANT CHANGE UP (ref 1–3.3)
LYMPHOCYTES # BLD AUTO: 13.7 % — SIGNIFICANT CHANGE UP (ref 13–44)
MCHC RBC-ENTMCNC: 30.9 PG — SIGNIFICANT CHANGE UP (ref 27–34)
MCHC RBC-ENTMCNC: 32.4 GM/DL — SIGNIFICANT CHANGE UP (ref 32–36)
MCV RBC AUTO: 95.4 FL — SIGNIFICANT CHANGE UP (ref 80–100)
MONOCYTES # BLD AUTO: 0.72 K/UL — SIGNIFICANT CHANGE UP (ref 0–0.9)
MONOCYTES NFR BLD AUTO: 9.4 % — SIGNIFICANT CHANGE UP (ref 2–14)
NEUTROPHILS # BLD AUTO: 5.6 K/UL — SIGNIFICANT CHANGE UP (ref 1.8–7.4)
NEUTROPHILS NFR BLD AUTO: 72.8 % — SIGNIFICANT CHANGE UP (ref 43–77)
NRBC # BLD: 0 /100 WBCS — SIGNIFICANT CHANGE UP (ref 0–0)
PLATELET # BLD AUTO: 208 K/UL — SIGNIFICANT CHANGE UP (ref 150–400)
POTASSIUM SERPL-MCNC: 5.5 MMOL/L — HIGH (ref 3.5–5.3)
POTASSIUM SERPL-SCNC: 5.5 MMOL/L — HIGH (ref 3.5–5.3)
PROT SERPL-MCNC: 6.7 G/DL — SIGNIFICANT CHANGE UP (ref 6–8.3)
PROTHROM AB SERPL-ACNC: 9.6 SEC — LOW (ref 10.5–13.4)
RBC # BLD: 4.53 M/UL — SIGNIFICANT CHANGE UP (ref 3.8–5.2)
RBC # FLD: 13.2 % — SIGNIFICANT CHANGE UP (ref 10.3–14.5)
SODIUM SERPL-SCNC: 131 MMOL/L — LOW (ref 135–145)
WBC # BLD: 7.68 K/UL — SIGNIFICANT CHANGE UP (ref 3.8–10.5)
WBC # FLD AUTO: 7.68 K/UL — SIGNIFICANT CHANGE UP (ref 3.8–10.5)

## 2023-02-23 PROCEDURE — 93010 ELECTROCARDIOGRAM REPORT: CPT

## 2023-02-23 PROCEDURE — 93931 UPPER EXTREMITY STUDY: CPT | Mod: 26,RT

## 2023-02-23 PROCEDURE — 99233 SBSQ HOSP IP/OBS HIGH 50: CPT

## 2023-02-23 RX ORDER — INSULIN LISPRO 100/ML
VIAL (ML) SUBCUTANEOUS AT BEDTIME
Refills: 0 | Status: DISCONTINUED | OUTPATIENT
Start: 2023-02-23 | End: 2023-02-25

## 2023-02-23 RX ADMIN — Medication 0.1 MILLIGRAM(S): at 05:25

## 2023-02-23 RX ADMIN — HEPARIN SODIUM 5000 UNIT(S): 5000 INJECTION INTRAVENOUS; SUBCUTANEOUS at 05:26

## 2023-02-23 RX ADMIN — Medication 0.1 MILLIGRAM(S): at 17:51

## 2023-02-23 RX ADMIN — RISPERIDONE 0.5 MILLIGRAM(S): 4 TABLET ORAL at 14:48

## 2023-02-23 RX ADMIN — Medication 100 MILLIGRAM(S): at 21:42

## 2023-02-23 RX ADMIN — Medication 50 MILLIGRAM(S): at 14:48

## 2023-02-23 RX ADMIN — Medication 3 MILLIGRAM(S): at 21:42

## 2023-02-23 RX ADMIN — HEPARIN SODIUM 5000 UNIT(S): 5000 INJECTION INTRAVENOUS; SUBCUTANEOUS at 17:51

## 2023-02-23 RX ADMIN — Medication 1: at 08:16

## 2023-02-23 RX ADMIN — Medication 81 MILLIGRAM(S): at 14:48

## 2023-02-23 RX ADMIN — ATORVASTATIN CALCIUM 40 MILLIGRAM(S): 80 TABLET, FILM COATED ORAL at 21:42

## 2023-02-23 RX ADMIN — Medication 1 TABLET(S): at 14:48

## 2023-02-23 RX ADMIN — SODIUM ZIRCONIUM CYCLOSILICATE 10 GRAM(S): 10 POWDER, FOR SUSPENSION ORAL at 14:49

## 2023-02-23 RX ADMIN — SENNA PLUS 1 TABLET(S): 8.6 TABLET ORAL at 14:49

## 2023-02-23 RX ADMIN — PANTOPRAZOLE SODIUM 40 MILLIGRAM(S): 20 TABLET, DELAYED RELEASE ORAL at 05:25

## 2023-02-23 RX ADMIN — Medication 1: at 00:34

## 2023-02-23 RX ADMIN — Medication 3: at 17:51

## 2023-02-23 NOTE — DIETITIAN INITIAL EVALUATION ADULT - OTHER INFO
70 y/o female with PMHx of A-fib (not on AC for hx of multiple falls), T2DM, HTN, HLD, ESRD on HD (MWF), CHF, CAD s/p CABG, PAD S/P R-->L bifem-fem BK pop bypass, recent fempop bypass (6/21/2022), and partial ray amputation right great toe (6/22/2022), a/w hyperkalemia (was initially scheduled for a fistulogram with Dr. Webster 2/22).     Problem/Plan - 1:  ·  Problem: Dialysis AV fistula malfunction.   ·  Plan: Hyperkalemia (K 6.6) pt received lokelma, insulin/dectrose, kayexalate in ED yesterday. Will f/u AM labs  Appreciate nephro recs, dialysis   F/u duplex of RUE to evaluate fistula  Tentatively rebooked as an add on for the OR on Friday; will re-evaluate day of     70 y/o female [facility] resident  with PMHx of A-fib (not on AC for hx of multiple falls), T2DM, HTN, HLD, ESRD on HD (MWF), CHF, CAD s/p CABG, PAD S/P R-->L bifem-fem BK pop bypass, recent fempop bypass (6/21/2022), and partial ray amputation right great toe (6/22/2022), a/w hyperkalemia  - 6.5 (was initially scheduled for a fistulogram with Dr. Webster 2/22) and initiated for nephrology eval > pt received kayexalate   Problem/Plan - 1:  ·  Problem: Dialysis AV fistula malfunction.   ·  Plan: Hyperkalemia (K 6.6) pt received lokelma, insulin/dectrose, kayexalate in ED yesterday. Will f/u AM labs  Appreciate nephro recs, dialysis   F/u duplex of RUE to evaluate fistula  Tentatively rebooked as an add on for the OR on Friday; will re-evaluate day of     70 y/o female [facility] resident  with PMHx of A-fib (not on AC for hx of multiple falls), T2DM, HTN, HLD, ESRD on HD (MWF), CHF, CAD s/p CABG, PAD S/P R-->L bifem-fem BK pop bypass, recent fempop bypass (6/21/2022), and partial ray amputation right great toe (6/22/2022), a/w hyperkalemia  - 6.5 (was initially scheduled for a fistulogram with Dr. Webster 2/22) and initiated for nephrology eval . Pt received lokelma, insulin/dectrose, kayexalate in ED yesterday.will follow up with am labs . OR planned for tomorrow.  At time of RD visit to pts room this am , pt not present. Then went to see pt in HD suite. She was sleeping soundly, name called x 3 . Per nsg flowsheets, pt ate 51-75% of meal served this am .

## 2023-02-23 NOTE — PATIENT CHOICE NOTE. - NSPTCHOICESTATE_GEN_ALL_CORE

## 2023-02-23 NOTE — DIETITIAN INITIAL EVALUATION ADULT - DIET TYPE
1500ml/consistent carbohydrate renal with evening snacks soft and bite-sized/1500ml/consistent carbohydrate renal with evening snacks

## 2023-02-23 NOTE — PROGRESS NOTE ADULT - SUBJECTIVE AND OBJECTIVE BOX
S: Patient seen and examined at bedside.  No acute overnight events, evening labs significant for K 6.6.  Patient reports no new complaints at this time. Patient denies any fever, chills, chest pain, shortness of breath, nausea, vomiting, or urinary complaints.    MEDICATIONS:  acetaminophen     Tablet .. 650 milliGRAM(s) Oral every 6 hours PRN  aspirin enteric coated 81 milliGRAM(s) Oral daily  atorvastatin 40 milliGRAM(s) Oral at bedtime  cloNIDine 0.1 milliGRAM(s) Oral two times a day  dextrose 5%. 1000 milliLiter(s) IV Continuous <Continuous>  dextrose 5%. 1000 milliLiter(s) IV Continuous <Continuous>  dextrose 50% Injectable 25 Gram(s) IV Push once  dextrose 50% Injectable 12.5 Gram(s) IV Push once  dextrose 50% Injectable 25 Gram(s) IV Push once  dextrose Oral Gel 15 Gram(s) Oral once PRN  glucagon  Injectable 1 milliGRAM(s) IntraMuscular once  heparin   Injectable 5000 Unit(s) SubCutaneous every 12 hours  imipramine 50 milliGRAM(s) Oral daily  insulin lispro (ADMELOG) corrective regimen sliding scale   SubCutaneous three times a day before meals  insulin lispro (ADMELOG) corrective regimen sliding scale   SubCutaneous at bedtime  melatonin 3 milliGRAM(s) Oral at bedtime  metoprolol tartrate 100 milliGRAM(s) Oral at bedtime  multivitamin 1 Tablet(s) Oral daily  ondansetron Injectable 4 milliGRAM(s) IV Push every 8 hours PRN  pantoprazole    Tablet 40 milliGRAM(s) Oral before breakfast  risperiDONE   Tablet 0.5 milliGRAM(s) Oral daily  senna 1 Tablet(s) Oral daily  sodium zirconium cyclosilicate 10 Gram(s) Oral daily    O:  Vital Signs Last 24 Hrs  T(C): 36.6 (23 Feb 2023 04:10), Max: 36.9 (22 Feb 2023 22:10)  T(F): 97.9 (23 Feb 2023 04:10), Max: 98.5 (22 Feb 2023 22:10)  HR: 65 (23 Feb 2023 04:10) (65 - 87)  BP: 152/64 (23 Feb 2023 04:10) (132/77 - 152/64)  RR: 18 (23 Feb 2023 04:10) (16 - 18)  SpO2: 95% (23 Feb 2023 04:10) (95% - 100%)    Parameters below as of 23 Feb 2023 04:10  Patient On (Oxygen Delivery Method): room air    I&O SUMMARY:    02-22-23 @ 07:01  -  02-23-23 @ 07:00  --------------------------------------------------------  IN:    Oral Fluid: 120 mL  Total IN: 120 mL    OUT:  Total OUT: 0 mL    Total NET: 120 mL    PHYSICAL EXAM:  GENERAL: No acute distress, resting comfortably in bed  HEAD:  Atraumatic, Normocephalic  CHEST/LUNG: Non-labored respirations, no accessory muscle use  HEART: Regular rate and rhythm  EXT: R forearm distal AVF with very mild palpable thrill  NEUROLOGY: no focal deficits    LABS:  02-22    132<L>  |  96  |  81<H>  ----------------------------<  172<H>  6.6<HH>   |  26  |  8.60<H>    Ca    10.1      22 Feb 2023 17:50    TPro  7.9  /  Alb  3.8  /  TBili  0.4  /  DBili  x   /  AST  12<L>  /  ALT  19  /  AlkPhos  153<H>  02-22                          15.3   8.21  )-----------( 198      ( 22 Feb 2023 17:50 )             48.4    S: Patient seen and examined at bedside.  No acute overnight events, evening labs significant for K 6.6.  Patient reports no new complaints at this time. Patient denies any fever, chills, chest pain, shortness of breath, nausea, vomiting, or urinary complaints.    MEDICATIONS:  acetaminophen     Tablet .. 650 milliGRAM(s) Oral every 6 hours PRN  aspirin enteric coated 81 milliGRAM(s) Oral daily  atorvastatin 40 milliGRAM(s) Oral at bedtime  cloNIDine 0.1 milliGRAM(s) Oral two times a day  dextrose 5%. 1000 milliLiter(s) IV Continuous <Continuous>  dextrose 5%. 1000 milliLiter(s) IV Continuous <Continuous>  dextrose 50% Injectable 25 Gram(s) IV Push once  dextrose 50% Injectable 12.5 Gram(s) IV Push once  dextrose 50% Injectable 25 Gram(s) IV Push once  dextrose Oral Gel 15 Gram(s) Oral once PRN  glucagon  Injectable 1 milliGRAM(s) IntraMuscular once  heparin   Injectable 5000 Unit(s) SubCutaneous every 12 hours  imipramine 50 milliGRAM(s) Oral daily  insulin lispro (ADMELOG) corrective regimen sliding scale   SubCutaneous three times a day before meals  insulin lispro (ADMELOG) corrective regimen sliding scale   SubCutaneous at bedtime  melatonin 3 milliGRAM(s) Oral at bedtime  metoprolol tartrate 100 milliGRAM(s) Oral at bedtime  multivitamin 1 Tablet(s) Oral daily  ondansetron Injectable 4 milliGRAM(s) IV Push every 8 hours PRN  pantoprazole    Tablet 40 milliGRAM(s) Oral before breakfast  risperiDONE   Tablet 0.5 milliGRAM(s) Oral daily  senna 1 Tablet(s) Oral daily  sodium zirconium cyclosilicate 10 Gram(s) Oral daily    O:  Vital Signs Last 24 Hrs  T(C): 36.6 (23 Feb 2023 04:10), Max: 36.9 (22 Feb 2023 22:10)  T(F): 97.9 (23 Feb 2023 04:10), Max: 98.5 (22 Feb 2023 22:10)  HR: 65 (23 Feb 2023 04:10) (65 - 87)  BP: 152/64 (23 Feb 2023 04:10) (132/77 - 152/64)  RR: 18 (23 Feb 2023 04:10) (16 - 18)  SpO2: 95% (23 Feb 2023 04:10) (95% - 100%)    Parameters below as of 23 Feb 2023 04:10  Patient On (Oxygen Delivery Method): room air    I&O SUMMARY:    02-22-23 @ 07:01  -  02-23-23 @ 07:00  --------------------------------------------------------  IN:    Oral Fluid: 120 mL  Total IN: 120 mL    OUT:  Total OUT: 0 mL    Total NET: 120 mL    PHYSICAL EXAM:  GENERAL: No acute distress, resting comfortably in bed  HEAD:  Atraumatic, Normocephalic  CHEST/LUNG: Non-labored respirations, no accessory muscle use  HEART: Regular rate and rhythm  EXT: R forearm distal AVF with very mild palpable thrill  NEUROLOGY: no focal deficits    LABS:  AM labs pending     02-22    132<L>  |  96  |  81<H>  ----------------------------<  172<H>  6.6<HH>   |  26  |  8.60<H>    Ca    10.1      22 Feb 2023 17:50    TPro  7.9  /  Alb  3.8  /  TBili  0.4  /  DBili  x   /  AST  12<L>  /  ALT  19  /  AlkPhos  153<H>  02-22                          15.3   8.21  )-----------( 198      ( 22 Feb 2023 17:50 )             48.4

## 2023-02-23 NOTE — CARE COORDINATION ASSESSMENT. - OTHER PERTINENT DISCHARGE PLANNING INFORMATION:
Patient is a 72 year old female admitted from [facility] nursing where she receives hemo dialysis. I called spouse listed as NOK but per Silvia at [facility] he is also at [facility] for rehab and she have me son Fernie [phone], sw name role availability explained and he wants to be notified when she is ready to return, he wants her to return there where her spouse is as well. SW marked consult at completed discharge planning for 2/25/2023. I upated JAYE nurse to make her aware of possible dc on 2/25/2023.  Patient is a 72 year old female admitted from [facility] nursing where she receives hemo dialysis. I called spouse listed as NOK but per Silvia at [facility] he is also at [facility] for rehab and she have me son Fernie [phone], sw name role availability explained and he wants to be notified when she is ready to return, he wants her to return there where her spouse is as well. TORIN marked consult at completed discharge planning for 2/25/2023. I upated JAYE nurse to make her aware of possible dc on 2/25/2023. I reached out to attending per son who wanted update on patient's hospital course

## 2023-02-23 NOTE — PROGRESS NOTE ADULT - SUBJECTIVE AND OBJECTIVE BOX
PROGRESS NOTE  Patient is a 72y old  Female who presents with a chief complaint of Hyperkalemia     (23 Feb 2023 09:19)    Chart and available morning labs /imaging are reviewed electronically , urgent issues addressed . More information  is being added upon completion of rounds , when more information is collected and management discussed with consultants , medical staff and social service/case management on the floor   OVERNIGHT    No new issues reported by medical staff . All above noted Patient is resting in a bed comfortably  .No distress noted   HPI:  71 year old female with PMH of Afib (not on AC for hx of multiple falls), T2DM, HTN, HLD, ESRD on HD (MWF), CHF, CAD s/p CABG, PAD S/P R-->L bifem-fem BK pop bypass, recent fempop bypass (6/21/2022), and partial ray amputation right great toe (6/22/2022) presented today from [facility] for fistulogram arranged by vascular sx , patient was not able to get intervention since she was found to be hyperkalemic K 6.5 and admitted for nephrology evaluation .Seen by cardiology cons .  Patient had podiatry sx surgery during previous [facility] admissions   Right hallux -Acute and chronic osteomyelitis. -Gangrenous skin and soft tissue. 2. Right first metatarsal/clean margin: -Minimal chronic osteomyelitis. Patient was on iv vancomycin with dialysis at [facility] and was followed by ID & PODIATRY at [facility]  (22 Feb 2023 17:23)    PAST MEDICAL & SURGICAL HISTORY:  HTN (hypertension)      h/o Anxiety attack      Depression      h/o Myocardial infarct 2007      h/o Hepatitis A 1969  currently resolved, no symptoms      Murmur, cardiac      h/o Smoking  quitted 3/2012      Anemia secondary to renal failure      ESRD on dialysis      Falls      Ataxia      Type 2 diabetes mellitus      Peripheral vascular disease, unspecified      CAD (coronary artery disease)      coronary stent 2007      s/p Ovarian cyst removal      s/p surgical removal of benign Skin lesion epigastric area      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 1000 milliLiter(s) (50 mL/Hr) IV Continuous <Continuous>  dextrose 5%. 1000 milliLiter(s) (100 mL/Hr) IV Continuous <Continuous>  dextrose 50% Injectable 25 Gram(s) IV Push once  dextrose 50% Injectable 12.5 Gram(s) IV Push once  dextrose 50% Injectable 25 Gram(s) IV Push once  glucagon  Injectable 1 milliGRAM(s) IntraMuscular once  heparin   Injectable 5000 Unit(s) SubCutaneous every 12 hours  imipramine 50 milliGRAM(s) Oral daily  insulin lispro (ADMELOG) corrective regimen sliding scale   SubCutaneous three times a day before meals  insulin lispro (ADMELOG) corrective regimen sliding scale   SubCutaneous at bedtime  melatonin 3 milliGRAM(s) Oral at bedtime  metoprolol tartrate 100 milliGRAM(s) Oral at bedtime  multivitamin 1 Tablet(s) Oral daily  pantoprazole    Tablet 40 milliGRAM(s) Oral before breakfast  risperiDONE   Tablet 0.5 milliGRAM(s) Oral daily  senna 1 Tablet(s) Oral daily  sodium zirconium cyclosilicate 10 Gram(s) Oral daily    MEDICATIONS  (PRN):  acetaminophen     Tablet .. 650 milliGRAM(s) Oral every 6 hours PRN Temp greater or equal to 38C (100.4F), Mild Pain (1 - 3)  dextrose Oral Gel 15 Gram(s) Oral once PRN Blood Glucose LESS THAN 70 milliGRAM(s)/deciliter  ondansetron Injectable 4 milliGRAM(s) IV Push every 8 hours PRN Nausea and/or Vomiting      OBJECTIVE    T(C): 36.6 (02-23-23 @ 04:10), Max: 36.9 (02-22-23 @ 22:10)  HR: 65 (02-23-23 @ 04:10) (65 - 87)  BP: 152/64 (02-23-23 @ 04:10) (132/77 - 152/64)  RR: 18 (02-23-23 @ 04:10) (16 - 18)  SpO2: 95% (02-23-23 @ 04:10) (95% - 100%)  Wt(kg): --  I&O's Summary    22 Feb 2023 07:01  -  23 Feb 2023 07:00  --------------------------------------------------------  IN: 120 mL / OUT: 0 mL / NET: 120 mL          REVIEW OF SYSTEMS:  CONSTITUTIONAL: No fever, weight loss, or fatigue  EYES: No eye pain, visual disturbances, or discharge  ENMT:   No sinus or throat pain  NECK: No pain or stiffness  RESPIRATORY: No cough, wheezing, chills or hemoptysis; No shortness of breath  CARDIOVASCULAR: No chest pain, palpitations, dizziness, or leg swelling  GASTROINTESTINAL: No abdominal pain. No nausea, vomiting; No diarrhea or constipation. No melena or hematochezia.  GENITOURINARY: No dysuria, frequency, hematuria, or incontinence  NEUROLOGICAL: No headaches, memory loss, loss of strength, numbness, or tremors  SKIN: No itching, burning, rashes, or lesions   MUSCULOSKELETAL: No joint pain or swelling; No muscle, back, or extremity pain    PHYSICAL EXAM:  Appearance: NAD. VS past 24 hrs -as above   HEENT:   Moist oral mucosa. Conjunctiva clear b/l.   Neck : supple  Respiratory: Lungs CTAB.  Gastrointestinal:  Soft, nontender. No rebound. No rigidity. BS present	  Cardiovascular: RRR ,S1S2 present  Neurologic: Non-focal. Moving all extremities.  Extremities: No edema. No erythema. No calf tenderness.  Skin: No rashes, No ecchymoses, No cyanosis.	  wounds ,skin lesions-See skin assesment flow sheet   LABS:                        14.0   7.68  )-----------( 208      ( 23 Feb 2023 11:30 )             43.2     02-23    131<L>  |  95<L>  |  98<H>  ----------------------------<  320<H>  5.5<H>   |  24  |  9.60<H>    Ca    9.5      23 Feb 2023 11:30    TPro  6.7  /  Alb  3.2<L>  /  TBili  0.4  /  DBili  x   /  AST  9<L>  /  ALT  20  /  AlkPhos  133<H>  02-23    CAPILLARY BLOOD GLUCOSE      POCT Blood Glucose.: 140 mg/dL (23 Feb 2023 13:27)  POCT Blood Glucose.: 193 mg/dL (23 Feb 2023 08:08)  POCT Blood Glucose.: 268 mg/dL (23 Feb 2023 00:15)  POCT Blood Glucose.: 156 mg/dL (22 Feb 2023 17:49)    PT/INR - ( 22 Feb 2023 17:50 )   PT: 10.6 sec;   INR: 0.91 ratio         PTT - ( 22 Feb 2023 17:50 )  PTT:28.2 sec      RADIOLOGY & ADDITIONAL TESTS:   reviewed elctronically  ASSESSMENT/PLAN: 	     PROGRESS NOTE  Patient is a 72y old  Female who presents with a chief complaint of Hyperkalemia     (23 Feb 2023 09:19)    Chart and available morning labs /imaging are reviewed electronically , urgent issues addressed . More information  is being added upon completion of rounds , when more information is collected and management discussed with consultants , medical staff and social service/case management on the floor   OVERNIGHT    No new issues reported by medical staff . All above noted Patient is resting in a bed comfortably  .No distress noted   HPI:  71 year old female with PMH of Afib (not on AC for hx of multiple falls), T2DM, HTN, HLD, ESRD on HD (MWF), CHF, CAD s/p CABG, PAD S/P R-->L bifem-fem BK pop bypass, recent fempop bypass (6/21/2022), and partial ray amputation right great toe (6/22/2022) presented today from [facility] for fistulogram arranged by vascular sx , patient was not able to get intervention since she was found to be hyperkalemic K 6.5 and admitted for nephrology evaluation .Seen by cardiology cons .  Patient had podiatry sx surgery during previous [facility] admissions   Right hallux -Acute and chronic osteomyelitis. -Gangrenous skin and soft tissue. 2. Right first metatarsal/clean margin: -Minimal chronic osteomyelitis. Patient was on iv vancomycin with dialysis at [facility] and was followed by ID & PODIATRY at [facility]  (22 Feb 2023 17:23)    PAST MEDICAL & SURGICAL HISTORY:  HTN (hypertension)      h/o Anxiety attack      Depression      h/o Myocardial infarct 2007      h/o Hepatitis A 1969  currently resolved, no symptoms      Murmur, cardiac      h/o Smoking  quitted 3/2012      Anemia secondary to renal failure      ESRD on dialysis      Falls      Ataxia      Type 2 diabetes mellitus      Peripheral vascular disease, unspecified      CAD (coronary artery disease)      coronary stent 2007      s/p Ovarian cyst removal      s/p surgical removal of benign Skin lesion epigastric area      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 1000 milliLiter(s) (50 mL/Hr) IV Continuous <Continuous>  dextrose 5%. 1000 milliLiter(s) (100 mL/Hr) IV Continuous <Continuous>  dextrose 50% Injectable 25 Gram(s) IV Push once  dextrose 50% Injectable 12.5 Gram(s) IV Push once  dextrose 50% Injectable 25 Gram(s) IV Push once  glucagon  Injectable 1 milliGRAM(s) IntraMuscular once  heparin   Injectable 5000 Unit(s) SubCutaneous every 12 hours  imipramine 50 milliGRAM(s) Oral daily  insulin lispro (ADMELOG) corrective regimen sliding scale   SubCutaneous three times a day before meals  insulin lispro (ADMELOG) corrective regimen sliding scale   SubCutaneous at bedtime  melatonin 3 milliGRAM(s) Oral at bedtime  metoprolol tartrate 100 milliGRAM(s) Oral at bedtime  multivitamin 1 Tablet(s) Oral daily  pantoprazole    Tablet 40 milliGRAM(s) Oral before breakfast  risperiDONE   Tablet 0.5 milliGRAM(s) Oral daily  senna 1 Tablet(s) Oral daily  sodium zirconium cyclosilicate 10 Gram(s) Oral daily    MEDICATIONS  (PRN):  acetaminophen     Tablet .. 650 milliGRAM(s) Oral every 6 hours PRN Temp greater or equal to 38C (100.4F), Mild Pain (1 - 3)  dextrose Oral Gel 15 Gram(s) Oral once PRN Blood Glucose LESS THAN 70 milliGRAM(s)/deciliter  ondansetron Injectable 4 milliGRAM(s) IV Push every 8 hours PRN Nausea and/or Vomiting      OBJECTIVE    T(C): 36.6 (02-23-23 @ 04:10), Max: 36.9 (02-22-23 @ 22:10)  HR: 65 (02-23-23 @ 04:10) (65 - 87)  BP: 152/64 (02-23-23 @ 04:10) (132/77 - 152/64)  RR: 18 (02-23-23 @ 04:10) (16 - 18)  SpO2: 95% (02-23-23 @ 04:10) (95% - 100%)  Wt(kg): --  I&O's Summary    22 Feb 2023 07:01  -  23 Feb 2023 07:00  --------------------------------------------------------  IN: 120 mL / OUT: 0 mL / NET: 120 mL          REVIEW OF SYSTEMS:  CONSTITUTIONAL: No fever, weight loss, or fatigue  EYES: No eye pain, visual disturbances, or discharge  ENMT:   No sinus or throat pain  NECK: No pain or stiffness  RESPIRATORY: No cough, wheezing, chills or hemoptysis; No shortness of breath  CARDIOVASCULAR: No chest pain, palpitations, dizziness, or leg swelling  GASTROINTESTINAL: No abdominal pain. No nausea, vomiting; No diarrhea or constipation. No melena or hematochezia.  GENITOURINARY: No dysuria, frequency, hematuria, or incontinence  NEUROLOGICAL: No headaches, memory loss, loss of strength, numbness, or tremors  SKIN: No itching, burning, rashes, or lesions   MUSCULOSKELETAL: No joint pain or swelling; No muscle, back, or extremity pain    PHYSICAL EXAM:  Appearance: NAD. VS past 24 hrs -as above   HEENT:   Moist oral mucosa. Conjunctiva clear b/l.   Neck : supple  Respiratory: Lungs CTAB.  Gastrointestinal:  Soft, nontender. No rebound. No rigidity. BS present	  Cardiovascular: RRR ,S1S2 present  Neurologic: Non-focal. Moving all extremities.  Extremities: No edema. No erythema. No calf tenderness.  Skin: No rashes, No ecchymoses, No cyanosis.	  wounds ,skin lesions-See skin assesment flow sheet   LABS:                        14.0   7.68  )-----------( 208      ( 23 Feb 2023 11:30 )             43.2     02-23    131<L>  |  95<L>  |  98<H>  ----------------------------<  320<H>  5.5<H>   |  24  |  9.60<H>    Ca    9.5      23 Feb 2023 11:30    TPro  6.7  /  Alb  3.2<L>  /  TBili  0.4  /  DBili  x   /  AST  9<L>  /  ALT  20  /  AlkPhos  133<H>  02-23    CAPILLARY BLOOD GLUCOSE      POCT Blood Glucose.: 140 mg/dL (23 Feb 2023 13:27)  POCT Blood Glucose.: 193 mg/dL (23 Feb 2023 08:08)  POCT Blood Glucose.: 268 mg/dL (23 Feb 2023 00:15)  POCT Blood Glucose.: 156 mg/dL (22 Feb 2023 17:49)    PT/INR - ( 22 Feb 2023 17:50 )   PT: 10.6 sec;   INR: 0.91 ratio         PTT - ( 22 Feb 2023 17:50 )  PTT:28.2 sec      RADIOLOGY & ADDITIONAL TESTS:   reviewed elctronically  ASSESSMENT/PLAN: 	    45 minutes aggregate time was spent on this visit, 50% visit time spent in care co-ordination with other attendings and counselling patient .I have discussed care plan with patient / HCP/family member ,who expressed understanding of problems treatment and their effect and side effects, alternatives in details. I have asked if they have any questions and concerns and appropriately addressed them to best of my ability.

## 2023-02-23 NOTE — DIETITIAN INITIAL EVALUATION ADULT - ENERGY INTAKE
71 year old female with PMH of Afib (not on AC for hx of multiple falls), T2DM, HTN, HLD, ESRD on HD (MWF), CHF, CAD s/p CABG, PAD S/P R-->L bifem-fem BK pop bypass, recent fempop bypass (6/21/2022), and partial ray amputation right great toe (6/22/2022) presented today from [facility] for fistulogram arranged by vascular sx , patient was not able to get intervention since she was found to be hyperkalemic K 6.5 and admitted for nephrology evaluation .Seen by cardiology cons .  Patient had podiatry sx surgery during [facility] admissions   Right hallux -Acute and chronic osteomyelitis. -Gangrenous skin and soft tissue. 2. Right first metatarsal/clean margin: -Minimal chronic osteomyelitis. [facility] & [facility]OD[facility]ATRY at [facility]  (22 Feb 2023 17:23)    esrd on hd   Excess fluids and waste products will be removed from your blood; your electrolytes will be balanced; your blood pressure will be controlled.      ANEMIA PLAN:  Anemia of chronic disease:  Well controlled by Aranesp  H and H subtherapeutic .  We will continue Aranesp aiming for a HCT of 32-36 %.   We will monitor Iron stores, B12 and RBC folate .    hyperkalemia kayexalate   lokelma  repeat k      Fair (50-75%)

## 2023-02-23 NOTE — PROGRESS NOTE ADULT - SUBJECTIVE AND OBJECTIVE BOX
Patient is a 72y Female with a known history of :  Hyperkalemia [E87.5]    HTN (hypertension) [I10]    ESRD on dialysis [N18.6]    Type 2 diabetes mellitus [E11.9]    CAD (coronary artery disease) [I25.10]    Anemia secondary to renal failure [N18.9]    Dialysis AV fistula malfunction [T82.590A]    Prophylactic measure [Z29.9]    Afib [I48.91]    Chronic combined systolic and diastolic heart failure [I50.42]    PAD (peripheral artery disease) [I73.9]      HPI:  71 year old female with PMH of Afib (not on AC for hx of multiple falls), T2DM, HTN, HLD, ESRD on HD (MWF), CHF, CAD s/p CABG, PAD S/P R-->L bifem-fem BK pop bypass, recent fempop bypass (6/21/2022), and partial ray amputation right great toe (6/22/2022) presented today from [facility] for fistulogram arranged by vascular sx , patient was not able to get intervention since she was found to be hyperkalemic K 6.5 and admitted for nephrology evaluation .Seen by cardiology cons .  Patient had podiatry sx surgery during previous [facility] admissions   Right hallux -Acute and chronic osteomyelitis. -Gangrenous skin and soft tissue. 2. Right first metatarsal/clean margin: -Minimal chronic osteomyelitis. Patient was on iv vancomycin with dialysis at [facility] and was followed by ID & PODIATRY at [facility]  (22 Feb 2023 17:23)      REVIEW OF SYSTEMS:    CONSTITUTIONAL: No fever, weight loss, or fatigue  EYES: No eye pain, visual disturbances, or discharge  ENMT:  No difficulty hearing, tinnitus, vertigo; No sinus or throat pain  NECK: No pain or stiffness  BREASTS: No pain, masses, or nipple discharge  RESPIRATORY: No cough, wheezing, chills or hemoptysis; No shortness of breath  CARDIOVASCULAR: No chest pain, palpitations, dizziness, or leg swelling  GASTROINTESTINAL: No abdominal or epigastric pain. No nausea, vomiting, or hematemesis; No diarrhea or constipation. No melena or hematochezia.  GENITOURINARY: No dysuria, frequency, hematuria, or incontinence  NEUROLOGICAL: No headaches, memory loss, loss of strength, numbness, or tremors  SKIN: No itching, burning, rashes, or lesions   LYMPH NODES: No enlarged glands  ENDOCRINE: No heat or cold intolerance; No hair loss  MUSCULOSKELETAL: No joint pain or swelling; No muscle, back, or extremity pain  PSYCHIATRIC: No depression, anxiety, mood swings, or difficulty sleeping  HEME/LYMPH: No easy bruising, or bleeding gums  ALLERGY AND IMMUNOLOGIC: No hives or eczema    MEDICATIONS  (STANDING):  aspirin enteric coated 81 milliGRAM(s) Oral daily  atorvastatin 40 milliGRAM(s) Oral at bedtime  cloNIDine 0.1 milliGRAM(s) Oral two times a day  dextrose 5%. 1000 milliLiter(s) (50 mL/Hr) IV Continuous <Continuous>  dextrose 5%. 1000 milliLiter(s) (100 mL/Hr) IV Continuous <Continuous>  dextrose 50% Injectable 25 Gram(s) IV Push once  dextrose 50% Injectable 12.5 Gram(s) IV Push once  dextrose 50% Injectable 25 Gram(s) IV Push once  glucagon  Injectable 1 milliGRAM(s) IntraMuscular once  heparin   Injectable 5000 Unit(s) SubCutaneous every 12 hours  imipramine 50 milliGRAM(s) Oral daily  insulin lispro (ADMELOG) corrective regimen sliding scale   SubCutaneous three times a day before meals  insulin lispro (ADMELOG) corrective regimen sliding scale   SubCutaneous at bedtime  melatonin 3 milliGRAM(s) Oral at bedtime  metoprolol tartrate 100 milliGRAM(s) Oral at bedtime  multivitamin 1 Tablet(s) Oral daily  pantoprazole    Tablet 40 milliGRAM(s) Oral before breakfast  risperiDONE   Tablet 0.5 milliGRAM(s) Oral daily  senna 1 Tablet(s) Oral daily  sodium zirconium cyclosilicate 10 Gram(s) Oral daily    MEDICATIONS  (PRN):  acetaminophen     Tablet .. 650 milliGRAM(s) Oral every 6 hours PRN Temp greater or equal to 38C (100.4F), Mild Pain (1 - 3)  dextrose Oral Gel 15 Gram(s) Oral once PRN Blood Glucose LESS THAN 70 milliGRAM(s)/deciliter  ondansetron Injectable 4 milliGRAM(s) IV Push every 8 hours PRN Nausea and/or Vomiting      ALLERGIES: latex (Hives)  No Known Drug Allergies      FAMILY HISTORY:  FH: myocardial infarction (Father)    FH: myocardial infarction (Father)    Family history of type 2 diabetes mellitus in brother (Sibling)        PHYSICAL EXAMINATION:  -----------------------------  T(C): 36.6 (02-23-23 @ 04:10), Max: 36.9 (02-22-23 @ 22:10)  HR: 65 (02-23-23 @ 04:10) (65 - 87)  BP: 152/64 (02-23-23 @ 04:10) (132/77 - 152/64)  RR: 18 (02-23-23 @ 04:10) (16 - 18)  SpO2: 95% (02-23-23 @ 04:10) (95% - 100%)  Wt(kg): --    02-22 @ 07:01  -  02-23 @ 07:00  --------------------------------------------------------  IN:    Oral Fluid: 120 mL  Total IN: 120 mL    OUT:  Total OUT: 0 mL    Total NET: 120 mL        Height (cm): 175.3 (02-22 @ 15:37)  Weight (kg): 53.1 (02-22 @ 15:37), 54 (02-22 @ 14:33)  BMI (kg/m2): 17.3 (02-22 @ 15:37)  BSA (m2): 1.65 (02-22 @ 15:37)    VITALS  T(C): 36.6 (02-23-23 @ 04:10), Max: 36.9 (02-22-23 @ 22:10)  HR: 65 (02-23-23 @ 04:10) (65 - 87)  BP: 152/64 (02-23-23 @ 04:10) (132/77 - 152/64)  RR: 18 (02-23-23 @ 04:10) (16 - 18)  SpO2: 95% (02-23-23 @ 04:10) (95% - 100%)    Constitutional: well developed, normal appearance, well groomed, well nourished, no deformities and no acute distress.   Eyes: the conjunctiva exhibited no abnormalities and the eyelids demonstrated no xanthelasmas.   HEENT: normal oral mucosa, no oral pallor and no oral cyanosis.   Neck: normal jugular venous A waves present, normal jugular venous V waves present and no jugular venous wilson A waves.   Pulmonary: no respiratory distress, normal respiratory rhythm and effort, no accessory muscle use and lungs were clear to auscultation bilaterally.   Cardiovascular: heart rate and rhythm were normal, normal S1 and S2 and no murmur, gallop, rub, heave or thrill are present.   Abdomen: soft, non-tender, no hepato-splenomegaly and no abdominal mass palpated.   Musculoskeletal: the gait could not be assessed..   Extremities: no clubbing of the fingernails, no localized cyanosis, no petechial hemorrhages and no ischemic changes.   Skin: normal skin color and pigmentation, no rash, no venous stasis, no skin lesions, no skin ulcer and no xanthoma was observed.   Psychiatric: oriented to person, place, and time, the affect was normal, the mood was normal and not feeling anxious.     LABS:   --------  02-22    132<L>  |  96  |  81<H>  ----------------------------<  172<H>  6.6<HH>   |  26  |  8.60<H>    Ca    10.1      22 Feb 2023 17:50    TPro  7.9  /  Alb  3.8  /  TBili  0.4  /  DBili  x   /  AST  12<L>  /  ALT  19  /  AlkPhos  153<H>  02-22                         15.3   8.21  )-----------( 198      ( 22 Feb 2023 17:50 )             48.4     PT/INR - ( 22 Feb 2023 17:50 )   PT: 10.6 sec;   INR: 0.91 ratio         PTT - ( 22 Feb 2023 17:50 )  PTT:28.2 sec            RADIOLOGY:  -----------------    ECG:     ECHO:

## 2023-02-23 NOTE — PROGRESS NOTE ADULT - SUBJECTIVE AND OBJECTIVE BOX
Patient is a 72y Female whom presented to the hospital with esrd on hd     PAST MEDICAL & SURGICAL HISTORY:  HTN (hypertension)      h/o Anxiety attack      Depression      h/o Myocardial infarct 2007      h/o Hepatitis A 1969  currently resolved, no symptoms      Murmur, cardiac      h/o Smoking  quitted 3/2012      Anemia secondary to renal failure      ESRD on dialysis      Falls      Ataxia      Type 2 diabetes mellitus      Peripheral vascular disease, unspecified      CAD (coronary artery disease)      coronary stent 2007      s/p Ovarian cyst removal      s/p surgical removal of benign Skin lesion epigastric area      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 1000 milliLiter(s) (50 mL/Hr) IV Continuous <Continuous>  dextrose 5%. 1000 milliLiter(s) (100 mL/Hr) IV Continuous <Continuous>  dextrose 50% Injectable 25 Gram(s) IV Push once  dextrose 50% Injectable 12.5 Gram(s) IV Push once  dextrose 50% Injectable 25 Gram(s) IV Push once  glucagon  Injectable 1 milliGRAM(s) IntraMuscular once  heparin   Injectable 5000 Unit(s) SubCutaneous every 12 hours  imipramine 50 milliGRAM(s) Oral daily  insulin lispro (ADMELOG) corrective regimen sliding scale   SubCutaneous three times a day before meals  melatonin 3 milliGRAM(s) Oral at bedtime  metoprolol tartrate 100 milliGRAM(s) Oral at bedtime  multivitamin 1 Tablet(s) Oral daily  pantoprazole    Tablet 40 milliGRAM(s) Oral before breakfast  risperiDONE   Tablet 0.5 milliGRAM(s) Oral daily  senna 1 Tablet(s) Oral daily      Allergies    latex (Hives)  No Known Drug Allergies    Intolerances        SOCIAL HISTORY:  Denies ETOh,Smoking,     FAMILY HISTORY:  FH: myocardial infarction (Father)    FH: myocardial infarction (Father)    Family history of type 2 diabetes mellitus in brother (Sibling)        REVIEW OF SYSTEMS:    CONSTITUTIONAL: No weakness, fevers or chills  RESPIRATORY: No cough, wheezing, hemoptysis; No shortness of breath  CARDIOVASCULAR: No chest pain or palpitations  GASTROINTESTINAL: No abdominal or epigastric pain. No nausea, vomiting,     No diarrhea or constipation. No melena   GENITOURINARY: No dysuria, frequency or hematuria  NEUROLOGICAL: No numbness or weakness  SKIN: dry                            14.0   7.68  )-----------( 208      ( 23 Feb 2023 11:30 )             43.2       CBC Full  -  ( 23 Feb 2023 11:30 )  WBC Count : 7.68 K/uL  RBC Count : 4.53 M/uL  Hemoglobin : 14.0 g/dL  Hematocrit : 43.2 %  Platelet Count - Automated : 208 K/uL  Mean Cell Volume : 95.4 fl  Mean Cell Hemoglobin : 30.9 pg  Mean Cell Hemoglobin Concentration : 32.4 gm/dL  Auto Neutrophil # : 5.60 K/uL  Auto Lymphocyte # : 1.05 K/uL  Auto Monocyte # : 0.72 K/uL  Auto Eosinophil # : 0.21 K/uL  Auto Basophil # : 0.05 K/uL  Auto Neutrophil % : 72.8 %  Auto Lymphocyte % : 13.7 %  Auto Monocyte % : 9.4 %  Auto Eosinophil % : 2.7 %  Auto Basophil % : 0.7 %      02-23    131<L>  |  95<L>  |  98<H>  ----------------------------<  320<H>  5.5<H>   |  24  |  9.60<H>    Ca    9.5      23 Feb 2023 11:30    TPro  6.7  /  Alb  3.2<L>  /  TBili  0.4  /  DBili  x   /  AST  9<L>  /  ALT  20  /  AlkPhos  133<H>  02-23      CAPILLARY BLOOD GLUCOSE      POCT Blood Glucose.: 185 mg/dL (23 Feb 2023 21:23)  POCT Blood Glucose.: 268 mg/dL (23 Feb 2023 17:17)  POCT Blood Glucose.: 140 mg/dL (23 Feb 2023 13:27)  POCT Blood Glucose.: 193 mg/dL (23 Feb 2023 08:08)  POCT Blood Glucose.: 268 mg/dL (23 Feb 2023 00:15)      Vital Signs Last 24 Hrs  T(C): 36.4 (23 Feb 2023 20:19), Max: 36.7 (23 Feb 2023 14:15)  T(F): 97.6 (23 Feb 2023 20:19), Max: 98 (23 Feb 2023 14:15)  HR: 81 (23 Feb 2023 20:19) (65 - 81)  BP: 148/75 (23 Feb 2023 20:19) (141/70 - 158/72)  BP(mean): --  RR: 16 (23 Feb 2023 20:19) (16 - 18)  SpO2: 96% (23 Feb 2023 20:19) (95% - 100%)    Parameters below as of 23 Feb 2023 20:19  Patient On (Oxygen Delivery Method): room air            PT/INR - ( 23 Feb 2023 13:20 )   PT: 9.6 sec;   INR: 0.82 ratio         PTT - ( 22 Feb 2023 17:50 )  PTT:28.2 sec    PHYSICAL EXAM:    Constitutional: NAD  HEENT: conjunctive   clear   Neck:  No JVD  Respiratory: CTAB  Cardiovascular: S1 and S2  Gastrointestinal: BS+, soft, NT/ND  Extremities: No peripheral edema  Neurological: A/O x 3, no focal deficits  Psychiatric: Normal mood, normal affect  : No Renteria  Skin: No rashes  Access: pos fistula

## 2023-02-23 NOTE — CARE COORDINATION ASSESSMENT. - NSCAREPROVIDERS_GEN_ALL_CORE_FT
CARE PROVIDERS:  Accepting Physician: Diamond Larson  Administration: SaintCharo Lima  Administration: Hira Lacy  Admitting: Diamond Larson  Attending: Diamond Larson  Case Management: Francia Dejesus  Consultant: Bryan Daily  Consultant: Weil, Patricia  Consultant: Joseluis James  Consultant: Caitlyn Bourgeois  Consultant: Manuelito Oneill  Consultant: Sayra Christensen  Consultant: Isaias Webster  Consultant: Lex Rodney  Consultant: Barbara Rivera  ED ACP: Mani Smith  ED Attending: Lavon Mahmood  ED Nurse: Mirela Edwards  Emergency Medicine: Lavon Mahmood  Nurse: Kimberley Phelps  Nurse: Bud Enciso  Nurse: Turner Kennedy  Ordered: Doctor, Unknown  Ordered: ADM, User  Ordered: Pahlavan, Mohsen  Ordered: Schmuter, Diamond  Outpatient Provider: Dev Shepherd  Outpatient Provider: Diamond Larson  Outpatient Provider: Pahlavan, Mohsen  Outpatient Provider: Mark Perezsen  Override: Leatha Huerta  PCA/Nursing Assistant: Naomi Beckman  PCA/Nursing Assistant: Shine Frost  Primary Team: Juanita Coppola  Primary Team: Joseph Mera  Primary Team: Barbara Guthrie  Quality Review: Danii Wong  Registered Dietitian: Izzy Champion  : Sabrina Menjivar  : Dimitri Brewer

## 2023-02-23 NOTE — PROGRESS NOTE ADULT - PROBLEM SELECTOR PLAN 1
- HyperHyperkalemia to be addressed - received lokelma & kayexalate in ED  - Nephro consult for dialysis schedule  - Will order duplex of RUE to evaluate fistula  - Tentatively rebooked as an add on for the OR on Friday; will re-evaluate day of  - F/u AM labs  - Discussed w/ Dr. Webster. -Received lokelma & kayexalate in ED  - Nephro consult for dialysis schedule  - Will order duplex of RUE to evaluate fistula  - Tentatively rebooked as an add on for the OR on Friday; will re-evaluate day of  - F/u AM labs  - Discussed w/ Dr. Webster. Hyperkalemia (K 6.6) pt received lokelma, insulin/dectrose, kayexalate in ED yesterday. Will f/u AM labs  Appreciate nephro recs, dialysis   F/u duplex of RUE to evaluate fistula  Tentatively rebooked as an add on for the OR on Friday; will re-evaluate day of    To discuss w/ Dr. Webster.

## 2023-02-23 NOTE — ADVANCED PRACTICE NURSE CONSULT - ASSESSMENT
LEV Argueta assessed patient and Identified stage 1 pressure injury: Rn's are independent in the identification and management of stage 1 and stage 2 pressure injury

## 2023-02-23 NOTE — DIETITIAN INITIAL EVALUATION ADULT - NSFNSPHYEXAMSKINFT_GEN_A_CORE
Pressure Injury 1: sacrum, Stage I  Pressure Injury 2: none, none  Pressure Injury 3: none, none  Pressure Injury 4: none, none  Pressure Injury 5: none, none  Pressure Injury 6: none, none  Pressure Injury 7: none, none  Pressure Injury 8: none, none  Pressure Injury 9: none, none  Pressure Injury 10: none, none  Pressure Injury 11: none, none, Pressure Injury 1: medial,coccyx, Stage II  Pressure Injury 2: none, none  Pressure Injury 3: none, none  Pressure Injury 4: none, none  Pressure Injury 5: none, none  Pressure Injury 6: none, none  Pressure Injury 7: none, none  Pressure Injury 8: none, none  Pressure Injury 9: none, none  Pressure Injury 10: none, none  Pressure Injury 11: none, none  sacrum, Stage I, medial,coccyx, Stage II

## 2023-02-23 NOTE — DIETITIAN INITIAL EVALUATION ADULT - PERTINENT LABORATORY DATA
02-22    132<L>  |  96  |  81<H>  ----------------------------<  172<H>  6.6<HH>   |  26  |  8.60<H>    Ca    10.1      22 Feb 2023 17:50    TPro  7.9  /  Alb  3.8  /  TBili  0.4  /  DBili  x   /  AST  12<L>  /  ALT  19  /  AlkPhos  153<H>  02-22  POCT Blood Glucose.: 193 mg/dL (02-23-23 @ 08:08)  A1C with Estimated Average Glucose Result: 6.8 % (01-31-23 @ 13:48)  A1C with Estimated Average Glucose Result: 9.3 % (09-13-22 @ 07:28)  A1C with Estimated Average Glucose Result: 9.0 % (06-17-22 @ 10:44)

## 2023-02-23 NOTE — PROGRESS NOTE ADULT - SUBJECTIVE AND OBJECTIVE BOX
Date/Time Patient Seen:  		  Referring MD:   Data Reviewed	       Patient is a 72y old  Female who presents with a chief complaint of hyperkalemia (22 Feb 2023 19:45)      Subjective/HPI     PAST MEDICAL & SURGICAL HISTORY:  Diabetes mellitus II    HTN (hypertension)    h/o Anxiety attack    Depression    h/o Myocardial infarct 2007    CAD (coronary artery disease)    CAD (coronary artery disease)    h/o Hepatitis A 1969  currently resolved, no symptoms    PAD (peripheral artery disease)    Murmur, cardiac    h/o Smoking  quitted 3/2012    CRF (chronic renal failure), unspecified stage    Dialysis patient    Anemia secondary to renal failure    HTN (hypertension)    Osteomyelitis    ESRD on dialysis    Falls    Ataxia    Type 2 diabetes mellitus    Peripheral vascular disease, unspecified    CAD (coronary artery disease)    coronary stent 2007    s/p Ovarian cyst removal    s/p surgical removal of benign Skin lesion epigastric area    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          Medication list         MEDICATIONS  (STANDING):  aspirin enteric coated 81 milliGRAM(s) Oral daily  atorvastatin 40 milliGRAM(s) Oral at bedtime  cloNIDine 0.1 milliGRAM(s) Oral two times a day  dextrose 5%. 1000 milliLiter(s) (100 mL/Hr) IV Continuous <Continuous>  dextrose 5%. 1000 milliLiter(s) (50 mL/Hr) IV Continuous <Continuous>  dextrose 50% Injectable 25 Gram(s) IV Push once  dextrose 50% Injectable 12.5 Gram(s) IV Push once  dextrose 50% Injectable 25 Gram(s) IV Push once  glucagon  Injectable 1 milliGRAM(s) IntraMuscular once  heparin   Injectable 5000 Unit(s) SubCutaneous every 12 hours  imipramine 50 milliGRAM(s) Oral daily  insulin lispro (ADMELOG) corrective regimen sliding scale   SubCutaneous three times a day before meals  insulin lispro (ADMELOG) corrective regimen sliding scale   SubCutaneous at bedtime  melatonin 3 milliGRAM(s) Oral at bedtime  metoprolol tartrate 100 milliGRAM(s) Oral at bedtime  multivitamin 1 Tablet(s) Oral daily  pantoprazole    Tablet 40 milliGRAM(s) Oral before breakfast  risperiDONE   Tablet 0.5 milliGRAM(s) Oral daily  senna 1 Tablet(s) Oral daily  sodium zirconium cyclosilicate 10 Gram(s) Oral daily    MEDICATIONS  (PRN):  acetaminophen     Tablet .. 650 milliGRAM(s) Oral every 6 hours PRN Temp greater or equal to 38C (100.4F), Mild Pain (1 - 3)  dextrose Oral Gel 15 Gram(s) Oral once PRN Blood Glucose LESS THAN 70 milliGRAM(s)/deciliter  ondansetron Injectable 4 milliGRAM(s) IV Push every 8 hours PRN Nausea and/or Vomiting         Vitals log        ICU Vital Signs Last 24 Hrs  T(C): 36.6 (23 Feb 2023 04:10), Max: 36.9 (22 Feb 2023 22:10)  T(F): 97.9 (23 Feb 2023 04:10), Max: 98.5 (22 Feb 2023 22:10)  HR: 65 (23 Feb 2023 04:10) (65 - 87)  BP: 152/64 (23 Feb 2023 04:10) (132/77 - 152/64)  BP(mean): --  ABP: --  ABP(mean): --  RR: 18 (23 Feb 2023 04:10) (16 - 18)  SpO2: 95% (23 Feb 2023 04:10) (95% - 100%)    O2 Parameters below as of 23 Feb 2023 04:10  Patient On (Oxygen Delivery Method): room air                 Input and Output:  I&O's Detail    22 Feb 2023 07:01  -  23 Feb 2023 05:42  --------------------------------------------------------  IN:    Oral Fluid: 120 mL  Total IN: 120 mL    OUT:  Total OUT: 0 mL    Total NET: 120 mL          Lab Data                        15.3   8.21  )-----------( 198      ( 22 Feb 2023 17:50 )             48.4     02-22    132<L>  |  96  |  81<H>  ----------------------------<  172<H>  6.6<HH>   |  26  |  8.60<H>    Ca    10.1      22 Feb 2023 17:50    TPro  7.9  /  Alb  3.8  /  TBili  0.4  /  DBili  x   /  AST  12<L>  /  ALT  19  /  AlkPhos  153<H>  02-22    ABG - ( 22 Feb 2023 17:35 )  pH, Arterial: 7.40  pH, Blood: x     /  pCO2: 44    /  pO2: 89    / HCO3: 27    / Base Excess: 2.5   /  SaO2: 99.3                    Review of Systems	      Objective     Physical Examination    heart s1s2  lung dec BS      Pertinent Lab findings & Imaging      Dexter:  NO   Adequate UO     I&O's Detail    22 Feb 2023 07:01  -  23 Feb 2023 05:42  --------------------------------------------------------  IN:    Oral Fluid: 120 mL  Total IN: 120 mL    OUT:  Total OUT: 0 mL    Total NET: 120 mL               Discussed with:     Cultures:	        Radiology

## 2023-02-23 NOTE — DIETITIAN INITIAL EVALUATION ADULT - PERTINENT MEDS FT
MEDICATIONS  (STANDING):  aspirin enteric coated 81 milliGRAM(s) Oral daily  atorvastatin 40 milliGRAM(s) Oral at bedtime  cloNIDine 0.1 milliGRAM(s) Oral two times a day  dextrose 5%. 1000 milliLiter(s) (50 mL/Hr) IV Continuous <Continuous>  dextrose 5%. 1000 milliLiter(s) (100 mL/Hr) IV Continuous <Continuous>  dextrose 50% Injectable 25 Gram(s) IV Push once  dextrose 50% Injectable 12.5 Gram(s) IV Push once  dextrose 50% Injectable 25 Gram(s) IV Push once  glucagon  Injectable 1 milliGRAM(s) IntraMuscular once  heparin   Injectable 5000 Unit(s) SubCutaneous every 12 hours  imipramine 50 milliGRAM(s) Oral daily  insulin lispro (ADMELOG) corrective regimen sliding scale   SubCutaneous three times a day before meals  insulin lispro (ADMELOG) corrective regimen sliding scale   SubCutaneous at bedtime  melatonin 3 milliGRAM(s) Oral at bedtime  metoprolol tartrate 100 milliGRAM(s) Oral at bedtime  multivitamin 1 Tablet(s) Oral daily  pantoprazole    Tablet 40 milliGRAM(s) Oral before breakfast  risperiDONE   Tablet 0.5 milliGRAM(s) Oral daily  senna 1 Tablet(s) Oral daily  sodium zirconium cyclosilicate 10 Gram(s) Oral daily    MEDICATIONS  (PRN):  acetaminophen     Tablet .. 650 milliGRAM(s) Oral every 6 hours PRN Temp greater or equal to 38C (100.4F), Mild Pain (1 - 3)  dextrose Oral Gel 15 Gram(s) Oral once PRN Blood Glucose LESS THAN 70 milliGRAM(s)/deciliter  ondansetron Injectable 4 milliGRAM(s) IV Push every 8 hours PRN Nausea and/or Vomiting

## 2023-02-23 NOTE — DISCHARGE PLANNING COVID-19 SCREEN - COVID-19 SCREEN DISCHARGE DISPOSITION LOCATION
Post-Acute Facility- Sent documentation of criteria met with referral. Post-Acute receiving facility contacted to provide warm handoff.

## 2023-02-23 NOTE — DIETITIAN INITIAL EVALUATION ADULT - PHYSCIAL ASSESSMENT
PT is needing to change from cipro & it caused her to become jittery & just generally anxious..   debilitated

## 2023-02-23 NOTE — CARE COORDINATION ASSESSMENT. - NSPASTMEDSURGHISTORY_GEN_ALL_CORE_FT
PAST MEDICAL & SURGICAL HISTORY:  h/o Smoking  quitted 3/2012      Murmur, cardiac      h/o Hepatitis A 1969  currently resolved, no symptoms      h/o Myocardial infarct 2007      Depression      h/o Anxiety attack      HTN (hypertension)      s/p surgical removal of benign Skin lesion epigastric area      s/p Ovarian cyst removal      coronary stent 2007      Anemia secondary to renal failure      Ataxia      Falls      ESRD on dialysis      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      CAD (coronary artery disease)      Peripheral vascular disease, unspecified      Type 2 diabetes mellitus

## 2023-02-23 NOTE — DIETITIAN INITIAL EVALUATION ADULT - NS FNS DIET ORDER
Diet, Consistent Carbohydrate Renal/No Snacks:   DASH/TLC {Sodium & Cholesterol Restricted}  Soft and Bite Sized (SOFTBTSZ)  No Concentrated Potassium  Supplement Feeding Modality:  Oral  Nepro Cans or Servings Per Day:  1       Frequency:  Daily (02-22-23 @ 17:17)

## 2023-02-24 LAB
ANION GAP SERPL CALC-SCNC: 12 MMOL/L — SIGNIFICANT CHANGE UP (ref 5–17)
BUN SERPL-MCNC: 77 MG/DL — HIGH (ref 7–23)
CALCIUM SERPL-MCNC: 9.8 MG/DL — SIGNIFICANT CHANGE UP (ref 8.5–10.1)
CHLORIDE SERPL-SCNC: 95 MMOL/L — LOW (ref 96–108)
CO2 SERPL-SCNC: 26 MMOL/L — SIGNIFICANT CHANGE UP (ref 22–31)
CREAT SERPL-MCNC: 8.4 MG/DL — HIGH (ref 0.5–1.3)
EGFR: 5 ML/MIN/1.73M2 — LOW
GLUCOSE SERPL-MCNC: 216 MG/DL — HIGH (ref 70–99)
HCT VFR BLD CALC: 47.7 % — HIGH (ref 34.5–45)
HGB BLD-MCNC: 15.5 G/DL — SIGNIFICANT CHANGE UP (ref 11.5–15.5)
MCHC RBC-ENTMCNC: 31.3 PG — SIGNIFICANT CHANGE UP (ref 27–34)
MCHC RBC-ENTMCNC: 32.5 GM/DL — SIGNIFICANT CHANGE UP (ref 32–36)
MCV RBC AUTO: 96.4 FL — SIGNIFICANT CHANGE UP (ref 80–100)
NRBC # BLD: 0 /100 WBCS — SIGNIFICANT CHANGE UP (ref 0–0)
PLATELET # BLD AUTO: 213 K/UL — SIGNIFICANT CHANGE UP (ref 150–400)
POTASSIUM SERPL-MCNC: 4.7 MMOL/L — SIGNIFICANT CHANGE UP (ref 3.5–5.3)
POTASSIUM SERPL-SCNC: 4.7 MMOL/L — SIGNIFICANT CHANGE UP (ref 3.5–5.3)
RBC # BLD: 4.95 M/UL — SIGNIFICANT CHANGE UP (ref 3.8–5.2)
RBC # FLD: 13 % — SIGNIFICANT CHANGE UP (ref 10.3–14.5)
SODIUM SERPL-SCNC: 133 MMOL/L — LOW (ref 135–145)
WBC # BLD: 7.02 K/UL — SIGNIFICANT CHANGE UP (ref 3.8–10.5)
WBC # FLD AUTO: 7.02 K/UL — SIGNIFICANT CHANGE UP (ref 3.8–10.5)

## 2023-02-24 PROCEDURE — 99233 SBSQ HOSP IP/OBS HIGH 50: CPT

## 2023-02-24 RX ORDER — SODIUM CHLORIDE 9 MG/ML
1000 INJECTION, SOLUTION INTRAVENOUS
Refills: 0 | Status: DISCONTINUED | OUTPATIENT
Start: 2023-02-24 | End: 2023-02-24

## 2023-02-24 RX ADMIN — SODIUM CHLORIDE 25 MILLILITER(S): 9 INJECTION, SOLUTION INTRAVENOUS at 12:34

## 2023-02-24 RX ADMIN — Medication 0.1 MILLIGRAM(S): at 17:38

## 2023-02-24 RX ADMIN — PANTOPRAZOLE SODIUM 40 MILLIGRAM(S): 20 TABLET, DELAYED RELEASE ORAL at 05:15

## 2023-02-24 RX ADMIN — RISPERIDONE 0.5 MILLIGRAM(S): 4 TABLET ORAL at 12:33

## 2023-02-24 RX ADMIN — Medication 1 TABLET(S): at 12:33

## 2023-02-24 RX ADMIN — HEPARIN SODIUM 5000 UNIT(S): 5000 INJECTION INTRAVENOUS; SUBCUTANEOUS at 17:38

## 2023-02-24 RX ADMIN — ATORVASTATIN CALCIUM 40 MILLIGRAM(S): 80 TABLET, FILM COATED ORAL at 21:56

## 2023-02-24 RX ADMIN — Medication 0.1 MILLIGRAM(S): at 05:14

## 2023-02-24 RX ADMIN — HEPARIN SODIUM 5000 UNIT(S): 5000 INJECTION INTRAVENOUS; SUBCUTANEOUS at 05:15

## 2023-02-24 RX ADMIN — SENNA PLUS 1 TABLET(S): 8.6 TABLET ORAL at 12:33

## 2023-02-24 RX ADMIN — Medication 100 MILLIGRAM(S): at 21:56

## 2023-02-24 RX ADMIN — Medication 3 MILLIGRAM(S): at 21:57

## 2023-02-24 RX ADMIN — SODIUM ZIRCONIUM CYCLOSILICATE 10 GRAM(S): 10 POWDER, FOR SUSPENSION ORAL at 12:34

## 2023-02-24 RX ADMIN — Medication 50 MILLIGRAM(S): at 12:33

## 2023-02-24 RX ADMIN — Medication 2: at 21:49

## 2023-02-24 RX ADMIN — Medication 81 MILLIGRAM(S): at 12:34

## 2023-02-24 NOTE — PROGRESS NOTE ADULT - SUBJECTIVE AND OBJECTIVE BOX
Date/Time Patient Seen:  		  Referring MD:   Data Reviewed	       Patient is a 72y old  Female who presents with a chief complaint of hyperkalemia (23 Feb 2023 22:10)      Subjective/HPI     PAST MEDICAL & SURGICAL HISTORY:  Diabetes mellitus II    HTN (hypertension)    h/o Anxiety attack    Depression    h/o Myocardial infarct 2007    CAD (coronary artery disease)    CAD (coronary artery disease)    h/o Hepatitis A 1969  currently resolved, no symptoms    PAD (peripheral artery disease)    Murmur, cardiac    h/o Smoking  quitted 3/2012    CRF (chronic renal failure), unspecified stage    Dialysis patient    Anemia secondary to renal failure    HTN (hypertension)    Osteomyelitis    ESRD on dialysis    Falls    Ataxia    Type 2 diabetes mellitus    Peripheral vascular disease, unspecified    CAD (coronary artery disease)    coronary stent 2007    s/p Ovarian cyst removal    s/p surgical removal of benign Skin lesion epigastric area    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          Medication list         MEDICATIONS  (STANDING):  aspirin enteric coated 81 milliGRAM(s) Oral daily  atorvastatin 40 milliGRAM(s) Oral at bedtime  cloNIDine 0.1 milliGRAM(s) Oral two times a day  dextrose 5%. 1000 milliLiter(s) (50 mL/Hr) IV Continuous <Continuous>  dextrose 5%. 1000 milliLiter(s) (100 mL/Hr) IV Continuous <Continuous>  dextrose 50% Injectable 25 Gram(s) IV Push once  dextrose 50% Injectable 12.5 Gram(s) IV Push once  dextrose 50% Injectable 25 Gram(s) IV Push once  glucagon  Injectable 1 milliGRAM(s) IntraMuscular once  heparin   Injectable 5000 Unit(s) SubCutaneous every 12 hours  imipramine 50 milliGRAM(s) Oral daily  insulin lispro (ADMELOG) corrective regimen sliding scale   SubCutaneous three times a day before meals  insulin lispro (ADMELOG) corrective regimen sliding scale   SubCutaneous at bedtime  melatonin 3 milliGRAM(s) Oral at bedtime  metoprolol tartrate 100 milliGRAM(s) Oral at bedtime  multivitamin 1 Tablet(s) Oral daily  pantoprazole    Tablet 40 milliGRAM(s) Oral before breakfast  risperiDONE   Tablet 0.5 milliGRAM(s) Oral daily  senna 1 Tablet(s) Oral daily  sodium zirconium cyclosilicate 10 Gram(s) Oral daily    MEDICATIONS  (PRN):  acetaminophen     Tablet .. 650 milliGRAM(s) Oral every 6 hours PRN Temp greater or equal to 38C (100.4F), Mild Pain (1 - 3)  dextrose Oral Gel 15 Gram(s) Oral once PRN Blood Glucose LESS THAN 70 milliGRAM(s)/deciliter  ondansetron Injectable 4 milliGRAM(s) IV Push every 8 hours PRN Nausea and/or Vomiting         Vitals log        ICU Vital Signs Last 24 Hrs  T(C): 36.8 (24 Feb 2023 04:35), Max: 36.8 (24 Feb 2023 04:35)  T(F): 98.2 (24 Feb 2023 04:35), Max: 98.2 (24 Feb 2023 04:35)  HR: 63 (24 Feb 2023 04:35) (63 - 81)  BP: 114/60 (24 Feb 2023 04:35) (114/60 - 158/72)  BP(mean): --  ABP: --  ABP(mean): --  RR: 15 (24 Feb 2023 04:35) (15 - 18)  SpO2: 98% (24 Feb 2023 04:35) (96% - 100%)    O2 Parameters below as of 24 Feb 2023 04:35  Patient On (Oxygen Delivery Method): room air                 Input and Output:  I&O's Detail    22 Feb 2023 07:01  -  23 Feb 2023 07:00  --------------------------------------------------------  IN:    Oral Fluid: 120 mL  Total IN: 120 mL    OUT:  Total OUT: 0 mL    Total NET: 120 mL      23 Feb 2023 07:01  -  24 Feb 2023 05:40  --------------------------------------------------------  IN:  Total IN: 0 mL    OUT:    Other (mL): 1500 mL  Total OUT: 1500 mL    Total NET: -1500 mL          Lab Data                        14.0   7.68  )-----------( 208      ( 23 Feb 2023 11:30 )             43.2     02-23    131<L>  |  95<L>  |  98<H>  ----------------------------<  320<H>  5.5<H>   |  24  |  9.60<H>    Ca    9.5      23 Feb 2023 11:30    TPro  6.7  /  Alb  3.2<L>  /  TBili  0.4  /  DBili  x   /  AST  9<L>  /  ALT  20  /  AlkPhos  133<H>  02-23    ABG - ( 22 Feb 2023 17:35 )  pH, Arterial: 7.40  pH, Blood: x     /  pCO2: 44    /  pO2: 89    / HCO3: 27    / Base Excess: 2.5   /  SaO2: 99.3                    Review of Systems	      Objective     Physical Examination    heart s1s2  lung dec BS      Pertinent Lab findings & Imaging      Dexter:  NO   Adequate UO     I&O's Detail    22 Feb 2023 07:01  -  23 Feb 2023 07:00  --------------------------------------------------------  IN:    Oral Fluid: 120 mL  Total IN: 120 mL    OUT:  Total OUT: 0 mL    Total NET: 120 mL      23 Feb 2023 07:01  -  24 Feb 2023 05:40  --------------------------------------------------------  IN:  Total IN: 0 mL    OUT:    Other (mL): 1500 mL  Total OUT: 1500 mL    Total NET: -1500 mL               Discussed with:     Cultures:	        Radiology

## 2023-02-24 NOTE — PROGRESS NOTE ADULT - SUBJECTIVE AND OBJECTIVE BOX
Patient is a 72y Female with a known history of :  Hyperkalemia [E87.5]    HTN (hypertension) [I10]    ESRD on dialysis [N18.6]    Type 2 diabetes mellitus [E11.9]    CAD (coronary artery disease) [I25.10]    Anemia secondary to renal failure [N18.9]    Dialysis AV fistula malfunction [T82.590A]    Prophylactic measure [Z29.9]    Afib [I48.91]    Chronic combined systolic and diastolic heart failure [I50.42]    PAD (peripheral artery disease) [I73.9]      HPI:  71 year old female with PMH of Afib (not on AC for hx of multiple falls), T2DM, HTN, HLD, ESRD on HD (MWF), CHF, CAD s/p CABG, PAD S/P R-->L bifem-fem BK pop bypass, recent fempop bypass (6/21/2022), and partial ray amputation right great toe (6/22/2022) presented today from [facility] for fistulogram arranged by vascular sx , patient was not able to get intervention since she was found to be hyperkalemic K 6.5 and admitted for nephrology evaluation .Seen by cardiology cons .  Patient had podiatry sx surgery during previous [facility] [place] [facility]   Right hallux -Acute and chronic osteomyelitis. -Gangrenous skin and soft tissue. 2. Right first metatarsal/clean margin: -Minimal chronic osteomyelitis. Patient was on iv vancomycin with dialysis at Replaced by [facility] and was followed by ID & PODIATRY at [facility]  (22 Feb 2023 17:23)      REVIEW OF SYSTEMS:    CONSTITUTIONAL: No fever, weight loss, or fatigue  EYES: No eye pain, visual disturbances, or discharge  ENMT:  No difficulty hearing, tinnitus, vertigo; No sinus or throat pain  NECK: No pain or stiffness  BREASTS: No pain, masses, or nipple discharge  RESPIRATORY: No cough, wheezing, chills or hemoptysis; No shortness of breath  CARDIOVASCULAR: No chest pain, palpitations, dizziness, or leg swelling  GASTROINTESTINAL: No abdominal or epigastric pain. No nausea, vomiting, or hematemesis; No diarrhea or constipation. No melena or hematochezia.  GENITOURINARY: No dysuria, frequency, hematuria, or incontinence  NEUROLOGICAL: No headaches, memory loss, loss of strength, numbness, or tremors  SKIN: No itching, burning, rashes, or lesions   LYMPH NODES: No enlarged glands  ENDOCRINE: No heat or cold intolerance; No hair loss  MUSCULOSKELETAL: No joint pain or swelling; No muscle, back, or extremity pain  PSYCHIATRIC: No depression, anxiety, mood swings, or difficulty sleeping  HEME/LYMPH: No easy bruising, or bleeding gums  ALLERGY AND IMMUNOLOGIC: No hives or eczema    MEDICATIONS  (STANDING):  aspirin enteric coated 81 milliGRAM(s) Oral daily  atorvastatin 40 milliGRAM(s) Oral at bedtime  cloNIDine 0.1 milliGRAM(s) Oral two times a day  dextrose 5%. 1000 milliLiter(s) (50 mL/Hr) IV Continuous <Continuous>  dextrose 5%. 1000 milliLiter(s) (100 mL/Hr) IV Continuous <Continuous>  dextrose 50% Injectable 25 Gram(s) IV Push once  dextrose 50% Injectable 12.5 Gram(s) IV Push once  dextrose 50% Injectable 25 Gram(s) IV Push once  glucagon  Injectable 1 milliGRAM(s) IntraMuscular once  heparin   Injectable 5000 Unit(s) SubCutaneous every 12 hours  imipramine 50 milliGRAM(s) Oral daily  insulin lispro (ADMELOG) corrective regimen sliding scale   SubCutaneous three times a day before meals  insulin lispro (ADMELOG) corrective regimen sliding scale   SubCutaneous at bedtime  melatonin 3 milliGRAM(s) Oral at bedtime  metoprolol tartrate 100 milliGRAM(s) Oral at bedtime  multivitamin 1 Tablet(s) Oral daily  Nephro-elvie 1 Tablet(s) Oral daily  pantoprazole    Tablet 40 milliGRAM(s) Oral before breakfast  risperiDONE   Tablet 0.5 milliGRAM(s) Oral daily  senna 1 Tablet(s) Oral daily  sodium zirconium cyclosilicate 10 Gram(s) Oral daily    MEDICATIONS  (PRN):  acetaminophen     Tablet .. 650 milliGRAM(s) Oral every 6 hours PRN Temp greater or equal to 38C (100.4F), Mild Pain (1 - 3)  dextrose Oral Gel 15 Gram(s) Oral once PRN Blood Glucose LESS THAN 70 milliGRAM(s)/deciliter  ondansetron Injectable 4 milliGRAM(s) IV Push every 8 hours PRN Nausea and/or Vomiting      ALLERGIES: latex (Hives)  No Known Drug Allergies      FAMILY HISTORY:  FH: myocardial infarction (Father)    FH: myocardial infarction (Father)    Family history of type 2 diabetes mellitus in brother (Sibling)        PHYSICAL EXAMINATION:  -----------------------------  T(C): 36.8 (02-24-23 @ 04:35), Max: 36.8 (02-24-23 @ 04:35)  HR: 63 (02-24-23 @ 04:35) (63 - 81)  BP: 114/60 (02-24-23 @ 04:35) (114/60 - 158/72)  RR: 15 (02-24-23 @ 04:35) (15 - 18)  SpO2: 98% (02-24-23 @ 04:35) (96% - 100%)  Wt(kg): --    02-23 @ 07:01  -  02-24 @ 07:00  --------------------------------------------------------  IN:  Total IN: 0 mL    OUT:    Other (mL): 1500 mL  Total OUT: 1500 mL    Total NET: -1500 mL            VITALS  T(C): 36.8 (02-24-23 @ 04:35), Max: 36.8 (02-24-23 @ 04:35)  HR: 63 (02-24-23 @ 04:35) (63 - 81)  BP: 114/60 (02-24-23 @ 04:35) (114/60 - 158/72)  RR: 15 (02-24-23 @ 04:35) (15 - 18)  SpO2: 98% (02-24-23 @ 04:35) (96% - 100%)    Constitutional: well developed, normal appearance, well groomed, well nourished, no deformities and no acute distress.   Eyes: the conjunctiva exhibited no abnormalities and the eyelids demonstrated no xanthelasmas.   HEENT: normal oral mucosa, no oral pallor and no oral cyanosis.   Neck: normal jugular venous A waves present, normal jugular venous V waves present and no jugular venous wilson A waves.   Pulmonary: no respiratory distress, normal respiratory rhythm and effort, no accessory muscle use and lungs were clear to auscultation bilaterally.   Cardiovascular: heart rate and rhythm were normal, normal S1 and S2 and no murmur, gallop, rub, heave or thrill are present.   Abdomen: soft, non-tender, no hepato-splenomegaly and no abdominal mass palpated.   Musculoskeletal: the gait could not be assessed..   Extremities: no clubbing of the fingernails, no localized cyanosis, no petechial hemorrhages and no ischemic changes.   Skin: normal skin color and pigmentation, no rash, no venous stasis, no skin lesions, no skin ulcer and no xanthoma was observed.   Psychiatric: oriented to person, place, and time, the affect was normal, the mood was normal and not feeling anxious.     LABS:   --------  02-24    133<L>  |  95<L>  |  77<H>  ----------------------------<  216<H>  4.7   |  26  |  8.40<H>    Ca    9.8      24 Feb 2023 06:34    TPro  6.7  /  Alb  3.2<L>  /  TBili  0.4  /  DBili  x   /  AST  9<L>  /  ALT  20  /  AlkPhos  133<H>  02-23                         15.5   7.02  )-----------( 213      ( 24 Feb 2023 06:34 )             47.7     PT/INR - ( 23 Feb 2023 13:20 )   PT: 9.6 sec;   INR: 0.82 ratio         PTT - ( 22 Feb 2023 17:50 )  PTT:28.2 sec            RADIOLOGY:  -----------------    ECG:     ECHO:

## 2023-02-24 NOTE — PROGRESS NOTE ADULT - SUBJECTIVE AND OBJECTIVE BOX
Patient is a 72y Female whom presented to the hospital with esrd on hd     PAST MEDICAL & SURGICAL HISTORY:  HTN (hypertension)      h/o Anxiety attack      Depression      h/o Myocardial infarct 2007      h/o Hepatitis A 1969  currently resolved, no symptoms      Murmur, cardiac      h/o Smoking  quitted 3/2012      Anemia secondary to renal failure      ESRD on dialysis      Falls      Ataxia      Type 2 diabetes mellitus      Peripheral vascular disease, unspecified      CAD (coronary artery disease)      coronary stent 2007      s/p Ovarian cyst removal      s/p surgical removal of benign Skin lesion epigastric area      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 1000 milliLiter(s) (50 mL/Hr) IV Continuous <Continuous>  dextrose 5%. 1000 milliLiter(s) (100 mL/Hr) IV Continuous <Continuous>  dextrose 50% Injectable 25 Gram(s) IV Push once  dextrose 50% Injectable 12.5 Gram(s) IV Push once  dextrose 50% Injectable 25 Gram(s) IV Push once  glucagon  Injectable 1 milliGRAM(s) IntraMuscular once  heparin   Injectable 5000 Unit(s) SubCutaneous every 12 hours  imipramine 50 milliGRAM(s) Oral daily  insulin lispro (ADMELOG) corrective regimen sliding scale   SubCutaneous three times a day before meals  melatonin 3 milliGRAM(s) Oral at bedtime  metoprolol tartrate 100 milliGRAM(s) Oral at bedtime  multivitamin 1 Tablet(s) Oral daily  pantoprazole    Tablet 40 milliGRAM(s) Oral before breakfast  risperiDONE   Tablet 0.5 milliGRAM(s) Oral daily  senna 1 Tablet(s) Oral daily      Allergies    latex (Hives)  No Known Drug Allergies    Intolerances        SOCIAL HISTORY:  Denies ETOh,Smoking,     FAMILY HISTORY:  FH: myocardial infarction (Father)    FH: myocardial infarction (Father)    Family history of type 2 diabetes mellitus in brother (Sibling)        REVIEW OF SYSTEMS:    CONSTITUTIONAL: No weakness, fevers or chills  RESPIRATORY: No cough, wheezing, hemoptysis; No shortness of breath  CARDIOVASCULAR: No chest pain or palpitations  GASTROINTESTINAL: No abdominal or epigastric pain. No nausea, vomiting,     No diarrhea or constipation. No melena   GENITOURINARY: No dysuria, frequency or hematuria  NEUROLOGICAL: No numbness or weakness  SKIN: dry                                                  15.5   7.02  )-----------( 213      ( 24 Feb 2023 06:34 )             47.7       CBC Full  -  ( 24 Feb 2023 06:34 )  WBC Count : 7.02 K/uL  RBC Count : 4.95 M/uL  Hemoglobin : 15.5 g/dL  Hematocrit : 47.7 %  Platelet Count - Automated : 213 K/uL  Mean Cell Volume : 96.4 fl  Mean Cell Hemoglobin : 31.3 pg  Mean Cell Hemoglobin Concentration : 32.5 gm/dL  Auto Neutrophil # : x  Auto Lymphocyte # : x  Auto Monocyte # : x  Auto Eosinophil # : x  Auto Basophil # : x  Auto Neutrophil % : x  Auto Lymphocyte % : x  Auto Monocyte % : x  Auto Eosinophil % : x  Auto Basophil % : x      02-24    133<L>  |  95<L>  |  77<H>  ----------------------------<  216<H>  4.7   |  26  |  8.40<H>    Ca    9.8      24 Feb 2023 06:34    TPro  6.7  /  Alb  3.2<L>  /  TBili  0.4  /  DBili  x   /  AST  9<L>  /  ALT  20  /  AlkPhos  133<H>  02-23      CAPILLARY BLOOD GLUCOSE      POCT Blood Glucose.: 201 mg/dL (24 Feb 2023 12:07)  POCT Blood Glucose.: 212 mg/dL (24 Feb 2023 07:58)  POCT Blood Glucose.: 185 mg/dL (23 Feb 2023 21:23)  POCT Blood Glucose.: 268 mg/dL (23 Feb 2023 17:17)      Vital Signs Last 24 Hrs  T(C): 36.3 (24 Feb 2023 12:48), Max: 36.8 (24 Feb 2023 04:35)  T(F): 97.3 (24 Feb 2023 12:48), Max: 98.2 (24 Feb 2023 04:35)  HR: 70 (24 Feb 2023 12:48) (63 - 81)  BP: 128/77 (24 Feb 2023 12:48) (114/60 - 148/75)  BP(mean): --  RR: 15 (24 Feb 2023 12:48) (15 - 16)  SpO2: 97% (24 Feb 2023 12:48) (96% - 98%)    Parameters below as of 24 Feb 2023 12:48  Patient On (Oxygen Delivery Method): room air            PT/INR - ( 23 Feb 2023 13:20 )   PT: 9.6 sec;   INR: 0.82 ratio         PTT - ( 22 Feb 2023 17:50 )  PTT:28.2 sec  PHYSICAL EXAM:    Constitutional: NAD  HEENT: conjunctive   clear   Neck:  No JVD  Respiratory: CTAB  Cardiovascular: S1 and S2  Gastrointestinal: BS+, soft, NT/ND  Extremities: No peripheral edema  Neurological:  no focal deficits  Psychiatric: Normal mood, normal affect  : No Renteria  Skin: No rashes  Access: pos fistula

## 2023-02-24 NOTE — PROGRESS NOTE ADULT - SUBJECTIVE AND OBJECTIVE BOX
SUBJECTIVE:  Patient seen and examined at bedside. No overnight events. Patient reports no new complaints at this time, is wishing to go home. Initially for add-on today for fistulogram.  Patient denies any fever, chills, chest pain, shortness of breath, nausea, vomiting, or urinary complaints.    VITALS  Vital Signs Last 24 Hrs  T(C): 36.3 (24 Feb 2023 12:48), Max: 36.8 (24 Feb 2023 04:35)  T(F): 97.3 (24 Feb 2023 12:48), Max: 98.2 (24 Feb 2023 04:35)  HR: 70 (24 Feb 2023 12:48) (63 - 81)  BP: 128/77 (24 Feb 2023 12:48) (114/60 - 148/75)  BP(mean): --  RR: 15 (24 Feb 2023 12:48) (15 - 16)  SpO2: 97% (24 Feb 2023 12:48) (96% - 98%)    Parameters below as of 24 Feb 2023 12:48  Patient On (Oxygen Delivery Method): room air    PHYSICAL EXAM  GENERAL:  Well-nourished, well-developed Female lying comfortably in bed in [facility].  HEENT:  NC/AT. Sclera white. Mucous membranes moist.  CARDIO:  Regular rate and rhythm.  No murmur, gallop or rub appreciated.  RESPIRATORY:  Nonlabored breathing, no accessory muscle use. Lungs clear to auscultation bilaterally.  No wheezing, rales or rhonchi appreciated.  ABDOMEN:  Soft, nondistended, nontender. BS appreciated on auscultation.  No rebound tenderness or guarding.  EXTREMITIES: R forearm distal AVF with very mild palpable thrill.  SKIN:  No jaundice, pallor, or cyanosis  NEURO:  A&O x 3    INTAKE & OUTPUT  I&O's Summary    23 Feb 2023 07:01  -  24 Feb 2023 07:00  --------------------------------------------------------  IN: 0 mL / OUT: 1500 mL / NET: -1500 mL    I&O's Detail    23 Feb 2023 07:01  -  24 Feb 2023 07:00  --------------------------------------------------------  IN:  Total IN: 0 mL    OUT:    Other (mL): 1500 mL  Total OUT: 1500 mL    Total NET: -1500 mL    MEDICATIONS  MEDICATIONS  (STANDING):  aspirin enteric coated 81 milliGRAM(s) Oral daily  atorvastatin 40 milliGRAM(s) Oral at bedtime  cloNIDine 0.1 milliGRAM(s) Oral two times a day  dextrose 5% + sodium chloride 0.45%. 1000 milliLiter(s) (25 mL/Hr) IV Continuous <Continuous>  dextrose 5%. 1000 milliLiter(s) (100 mL/Hr) IV Continuous <Continuous>  dextrose 5%. 1000 milliLiter(s) (50 mL/Hr) IV Continuous <Continuous>  dextrose 50% Injectable 25 Gram(s) IV Push once  dextrose 50% Injectable 12.5 Gram(s) IV Push once  dextrose 50% Injectable 25 Gram(s) IV Push once  glucagon  Injectable 1 milliGRAM(s) IntraMuscular once  heparin   Injectable 5000 Unit(s) SubCutaneous every 12 hours  imipramine 50 milliGRAM(s) Oral daily  insulin lispro (ADMELOG) corrective regimen sliding scale   SubCutaneous three times a day before meals  insulin lispro (ADMELOG) corrective regimen sliding scale   SubCutaneous at bedtime  melatonin 3 milliGRAM(s) Oral at bedtime  metoprolol tartrate 100 milliGRAM(s) Oral at bedtime  multivitamin 1 Tablet(s) Oral daily  Nephro-elvie 1 Tablet(s) Oral daily  pantoprazole    Tablet 40 milliGRAM(s) Oral before breakfast  risperiDONE   Tablet 0.5 milliGRAM(s) Oral daily  senna 1 Tablet(s) Oral daily  sodium zirconium cyclosilicate 10 Gram(s) Oral daily    MEDICATIONS  (PRN):  acetaminophen     Tablet .. 650 milliGRAM(s) Oral every 6 hours PRN Temp greater or equal to 38C (100.4F), Mild Pain (1 - 3)  dextrose Oral Gel 15 Gram(s) Oral once PRN Blood Glucose LESS THAN 70 milliGRAM(s)/deciliter  ondansetron Injectable 4 milliGRAM(s) IV Push every 8 hours PRN Nausea and/or Vomiting    LABS:                      15.5   7.02  )-----------( 213      ( 24 Feb 2023 06:34 )             47.7     02-24    133<L>  |  95<L>  |  77<H>  ----------------------------<  216<H>  4.7   |  26  |  8.40<H>    Ca    9.8      24 Feb 2023 06:34    TPro  6.7  /  Alb  3.2<L>  /  TBili  0.4  /  DBili  x   /  AST  9<L>  /  ALT  20  /  AlkPhos  133<H>  02-23    PT/INR - ( 23 Feb 2023 13:20 )   PT: 9.6 sec;   INR: 0.82 ratio    PTT - ( 22 Feb 2023 17:50 )  PTT:28.2 sec    ASSESSMENT & PLAN  72 y/o female with PMHx of A-fib (not on AC for hx of multiple falls), T2DM, HTN, HLD, ESRD on HD (MWF), CHF, CAD s/p CABG, PAD S/P R-->L bifem-fem BK pop bypass, recent fempop bypass (6/21/2022), and partial ray amputation right great toe (6/22/2022), a/w hyperkalemia (was initially scheduled for a fistulogram with Dr. Webster 2/22).    Unable to add-on to OR schedule today, pt stable at present, okay to f/u outpt for fistulogram  Dc as per primary team  d/w Dr. Webster.

## 2023-02-25 ENCOUNTER — TRANSCRIPTION ENCOUNTER (OUTPATIENT)
Age: 73
End: 2023-02-25

## 2023-02-25 VITALS — SYSTOLIC BLOOD PRESSURE: 120 MMHG | DIASTOLIC BLOOD PRESSURE: 76 MMHG | HEART RATE: 94 BPM

## 2023-02-25 LAB
ANION GAP SERPL CALC-SCNC: 15 MMOL/L — SIGNIFICANT CHANGE UP (ref 5–17)
BUN SERPL-MCNC: 101 MG/DL — HIGH (ref 7–23)
CALCIUM SERPL-MCNC: 9.1 MG/DL — SIGNIFICANT CHANGE UP (ref 8.5–10.1)
CHLORIDE SERPL-SCNC: 94 MMOL/L — LOW (ref 96–108)
CO2 SERPL-SCNC: 21 MMOL/L — LOW (ref 22–31)
CREAT SERPL-MCNC: 10 MG/DL — HIGH (ref 0.5–1.3)
EGFR: 4 ML/MIN/1.73M2 — LOW
GLUCOSE SERPL-MCNC: 218 MG/DL — HIGH (ref 70–99)
HCT VFR BLD CALC: 48.1 % — HIGH (ref 34.5–45)
HGB BLD-MCNC: 15.1 G/DL — SIGNIFICANT CHANGE UP (ref 11.5–15.5)
MCHC RBC-ENTMCNC: 30.3 PG — SIGNIFICANT CHANGE UP (ref 27–34)
MCHC RBC-ENTMCNC: 31.4 GM/DL — LOW (ref 32–36)
MCV RBC AUTO: 96.4 FL — SIGNIFICANT CHANGE UP (ref 80–100)
NRBC # BLD: 0 /100 WBCS — SIGNIFICANT CHANGE UP (ref 0–0)
PLATELET # BLD AUTO: 187 K/UL — SIGNIFICANT CHANGE UP (ref 150–400)
POTASSIUM SERPL-MCNC: 5.3 MMOL/L — SIGNIFICANT CHANGE UP (ref 3.5–5.3)
POTASSIUM SERPL-SCNC: 5.3 MMOL/L — SIGNIFICANT CHANGE UP (ref 3.5–5.3)
RBC # BLD: 4.99 M/UL — SIGNIFICANT CHANGE UP (ref 3.8–5.2)
RBC # FLD: 13.2 % — SIGNIFICANT CHANGE UP (ref 10.3–14.5)
SARS-COV-2 RNA SPEC QL NAA+PROBE: SIGNIFICANT CHANGE UP
SODIUM SERPL-SCNC: 130 MMOL/L — LOW (ref 135–145)
WBC # BLD: 7.28 K/UL — SIGNIFICANT CHANGE UP (ref 3.8–10.5)
WBC # FLD AUTO: 7.28 K/UL — SIGNIFICANT CHANGE UP (ref 3.8–10.5)

## 2023-02-25 PROCEDURE — 93005 ELECTROCARDIOGRAM TRACING: CPT

## 2023-02-25 PROCEDURE — 71045 X-RAY EXAM CHEST 1 VIEW: CPT

## 2023-02-25 PROCEDURE — 36600 WITHDRAWAL OF ARTERIAL BLOOD: CPT

## 2023-02-25 PROCEDURE — 80048 BASIC METABOLIC PNL TOTAL CA: CPT

## 2023-02-25 PROCEDURE — 93931 UPPER EXTREMITY STUDY: CPT

## 2023-02-25 PROCEDURE — 80053 COMPREHEN METABOLIC PANEL: CPT

## 2023-02-25 PROCEDURE — 85025 COMPLETE CBC W/AUTO DIFF WBC: CPT

## 2023-02-25 PROCEDURE — 87637 SARSCOV2&INF A&B&RSV AMP PRB: CPT

## 2023-02-25 PROCEDURE — 99261: CPT

## 2023-02-25 PROCEDURE — 85610 PROTHROMBIN TIME: CPT

## 2023-02-25 PROCEDURE — 84132 ASSAY OF SERUM POTASSIUM: CPT

## 2023-02-25 PROCEDURE — 36415 COLL VENOUS BLD VENIPUNCTURE: CPT

## 2023-02-25 PROCEDURE — 82803 BLOOD GASES ANY COMBINATION: CPT

## 2023-02-25 PROCEDURE — 87635 SARS-COV-2 COVID-19 AMP PRB: CPT

## 2023-02-25 PROCEDURE — 85027 COMPLETE CBC AUTOMATED: CPT

## 2023-02-25 PROCEDURE — 96374 THER/PROPH/DIAG INJ IV PUSH: CPT

## 2023-02-25 PROCEDURE — 99285 EMERGENCY DEPT VISIT HI MDM: CPT

## 2023-02-25 PROCEDURE — 85730 THROMBOPLASTIN TIME PARTIAL: CPT

## 2023-02-25 PROCEDURE — 99233 SBSQ HOSP IP/OBS HIGH 50: CPT

## 2023-02-25 PROCEDURE — 82962 GLUCOSE BLOOD TEST: CPT

## 2023-02-25 PROCEDURE — 93971 EXTREMITY STUDY: CPT

## 2023-02-25 RX ORDER — METOPROLOL TARTRATE 50 MG
1 TABLET ORAL
Qty: 0 | Refills: 0 | DISCHARGE
Start: 2023-02-25

## 2023-02-25 RX ORDER — SODIUM ZIRCONIUM CYCLOSILICATE 10 G/10G
10 POWDER, FOR SUSPENSION ORAL
Qty: 0 | Refills: 0 | DISCHARGE
Start: 2023-02-25

## 2023-02-25 RX ORDER — ACETAMINOPHEN 500 MG
2 TABLET ORAL
Qty: 0 | Refills: 0 | DISCHARGE
Start: 2023-02-25

## 2023-02-25 RX ADMIN — Medication 1 TABLET(S): at 12:29

## 2023-02-25 RX ADMIN — HEPARIN SODIUM 5000 UNIT(S): 5000 INJECTION INTRAVENOUS; SUBCUTANEOUS at 06:07

## 2023-02-25 RX ADMIN — SODIUM ZIRCONIUM CYCLOSILICATE 10 GRAM(S): 10 POWDER, FOR SUSPENSION ORAL at 12:30

## 2023-02-25 RX ADMIN — Medication 81 MILLIGRAM(S): at 12:33

## 2023-02-25 RX ADMIN — Medication 2: at 08:26

## 2023-02-25 RX ADMIN — Medication 50 MILLIGRAM(S): at 12:29

## 2023-02-25 RX ADMIN — Medication 0.1 MILLIGRAM(S): at 19:22

## 2023-02-25 RX ADMIN — Medication 0.1 MILLIGRAM(S): at 06:06

## 2023-02-25 RX ADMIN — PANTOPRAZOLE SODIUM 40 MILLIGRAM(S): 20 TABLET, DELAYED RELEASE ORAL at 06:06

## 2023-02-25 RX ADMIN — Medication 1: at 12:37

## 2023-02-25 RX ADMIN — RISPERIDONE 0.5 MILLIGRAM(S): 4 TABLET ORAL at 12:30

## 2023-02-25 RX ADMIN — SENNA PLUS 1 TABLET(S): 8.6 TABLET ORAL at 12:30

## 2023-02-25 RX ADMIN — Medication 1 TABLET(S): at 12:30

## 2023-02-25 RX ADMIN — HEPARIN SODIUM 5000 UNIT(S): 5000 INJECTION INTRAVENOUS; SUBCUTANEOUS at 19:22

## 2023-02-25 NOTE — PROGRESS NOTE ADULT - NUTRITIONAL ASSESSMENT
MEDICATIONS  (STANDING):  aspirin enteric coated 81 milliGRAM(s) Oral daily  atorvastatin 40 milliGRAM(s) Oral at bedtime  cloNIDine 0.1 milliGRAM(s) Oral two times a day  dextrose 5%. 1000 milliLiter(s) (50 mL/Hr) IV Continuous <Continuous>  dextrose 5%. 1000 milliLiter(s) (100 mL/Hr) IV Continuous <Continuous>  dextrose 50% Injectable 25 Gram(s) IV Push once  dextrose 50% Injectable 12.5 Gram(s) IV Push once  dextrose 50% Injectable 25 Gram(s) IV Push once  glucagon  Injectable 1 milliGRAM(s) IntraMuscular once  heparin   Injectable 5000 Unit(s) SubCutaneous every 12 hours  imipramine 50 milliGRAM(s) Oral daily  insulin lispro (ADMELOG) corrective regimen sliding scale   SubCutaneous three times a day before meals  insulin lispro (ADMELOG) corrective regimen sliding scale   SubCutaneous at bedtime  melatonin 3 milliGRAM(s) Oral at bedtime  metoprolol tartrate 100 milliGRAM(s) Oral at bedtime  multivitamin 1 Tablet(s) Oral daily  pantoprazole    Tablet 40 milliGRAM(s) Oral before breakfast  risperiDONE   Tablet 0.5 milliGRAM(s) Oral daily  senna 1 Tablet(s) Oral daily  sodium zirconium cyclosilicate 10 Gram(s) Oral daily

## 2023-02-25 NOTE — PROGRESS NOTE ADULT - SUBJECTIVE AND OBJECTIVE BOX
Patient is a 72y Female with a known history of :  Hyperkalemia [E87.5]    HTN (hypertension) [I10]    ESRD on dialysis [N18.6]    Type 2 diabetes mellitus [E11.9]    CAD (coronary artery disease) [I25.10]    Anemia secondary to renal failure [N18.9]    Dialysis AV fistula malfunction [T82.590A]    Prophylactic measure [Z29.9]    Afib [I48.91]    Chronic combined systolic and diastolic heart failure [I50.42]    PAD (peripheral artery disease) [I73.9]      HPI:  71 year old female with PMH of Afib (not on AC for hx of multiple falls), T2DM, HTN, HLD, ESRD on HD (MWF), CHF, CAD s/p CABG, PAD S/P R-->L bifem-fem BK pop bypass, recent fempop bypass (6/21/2022), and partial ray amputation right great toe (6/22/2022) presented today from [facility] for fistulogram arranged by vascular sx , patient was not able to get intervention since she was found to be hyperkalemic K 6.5 and admitted for nephrology evaluation .Seen by cardiology cons .  Patient had podiatry sx surgery during previous [facility] admissions   Right hallux -Acute and chronic osteomyelitis. -Gangrenous skin and soft tissue. 2. Right first metatarsal/clean margin: -Minimal chronic osteomyelitis. Patient was on iv vancomycin with dialysis at [facility] and was followed by ID & PODIATRY at [facility]  (22 Feb 2023 17:23)      REVIEW OF SYSTEMS:    CONSTITUTIONAL: No fever, weight loss, or fatigue  EYES: No eye pain, visual disturbances, or discharge  ENMT:  No difficulty hearing, tinnitus, vertigo; No sinus or throat pain  NECK: No pain or stiffness  BREASTS: No pain, masses, or nipple discharge  RESPIRATORY: No cough, wheezing, chills or hemoptysis; No shortness of breath  CARDIOVASCULAR: No chest pain, palpitations, dizziness, or leg swelling  GASTROINTESTINAL: No abdominal or epigastric pain. No nausea, vomiting, or hematemesis; No diarrhea or constipation. No melena or hematochezia.  GENITOURINARY: No dysuria, frequency, hematuria, or incontinence  NEUROLOGICAL: No headaches, memory loss, loss of strength, numbness, or tremors  SKIN: No itching, burning, rashes, or lesions   LYMPH NODES: No enlarged glands  ENDOCRINE: No heat or cold intolerance; No hair loss  MUSCULOSKELETAL: No joint pain or swelling; No muscle, back, or extremity pain  PSYCHIATRIC: No depression, anxiety, mood swings, or difficulty sleeping  HEME/LYMPH: No easy bruising, or bleeding gums  ALLERGY AND IMMUNOLOGIC: No hives or eczema    MEDICATIONS  (STANDING):  aspirin enteric coated 81 milliGRAM(s) Oral daily  atorvastatin 40 milliGRAM(s) Oral at bedtime  cloNIDine 0.1 milliGRAM(s) Oral two times a day  dextrose 5%. 1000 milliLiter(s) (50 mL/Hr) IV Continuous <Continuous>  dextrose 5%. 1000 milliLiter(s) (100 mL/Hr) IV Continuous <Continuous>  dextrose 50% Injectable 25 Gram(s) IV Push once  dextrose 50% Injectable 25 Gram(s) IV Push once  dextrose 50% Injectable 12.5 Gram(s) IV Push once  glucagon  Injectable 1 milliGRAM(s) IntraMuscular once  heparin   Injectable 5000 Unit(s) SubCutaneous every 12 hours  imipramine 50 milliGRAM(s) Oral daily  insulin lispro (ADMELOG) corrective regimen sliding scale   SubCutaneous three times a day before meals  insulin lispro (ADMELOG) corrective regimen sliding scale   SubCutaneous at bedtime  melatonin 3 milliGRAM(s) Oral at bedtime  metoprolol tartrate 100 milliGRAM(s) Oral at bedtime  multivitamin 1 Tablet(s) Oral daily  Nephro-elvie 1 Tablet(s) Oral daily  pantoprazole    Tablet 40 milliGRAM(s) Oral before breakfast  risperiDONE   Tablet 0.5 milliGRAM(s) Oral daily  senna 1 Tablet(s) Oral daily  sodium zirconium cyclosilicate 10 Gram(s) Oral daily    MEDICATIONS  (PRN):  acetaminophen     Tablet .. 650 milliGRAM(s) Oral every 6 hours PRN Temp greater or equal to 38C (100.4F), Mild Pain (1 - 3)  dextrose Oral Gel 15 Gram(s) Oral once PRN Blood Glucose LESS THAN 70 milliGRAM(s)/deciliter  ondansetron Injectable 4 milliGRAM(s) IV Push every 8 hours PRN Nausea and/or Vomiting      ALLERGIES: latex (Hives)  No Known Drug Allergies      FAMILY HISTORY:  FH: myocardial infarction (Father)    FH: myocardial infarction (Father)    Family history of type 2 diabetes mellitus in brother (Sibling)        PHYSICAL EXAMINATION:  -----------------------------  T(C): 36.6 (02-25-23 @ 05:43), Max: 37.1 (02-24-23 @ 20:14)  HR: 67 (02-25-23 @ 05:43) (67 - 90)  BP: 135/74 (02-25-23 @ 05:43) (128/77 - 170/74)  RR: 18 (02-25-23 @ 05:43) (15 - 18)  SpO2: 95% (02-25-23 @ 05:43) (95% - 98%)  Wt(kg): --        VITALS  T(C): 36.6 (02-25-23 @ 05:43), Max: 37.1 (02-24-23 @ 20:14)  HR: 67 (02-25-23 @ 05:43) (67 - 90)  BP: 135/74 (02-25-23 @ 05:43) (128/77 - 170/74)  RR: 18 (02-25-23 @ 05:43) (15 - 18)  SpO2: 95% (02-25-23 @ 05:43) (95% - 98%)    Constitutional: well developed, normal appearance, well groomed, well nourished, no deformities and no acute distress.   Eyes: the conjunctiva exhibited no abnormalities and the eyelids demonstrated no xanthelasmas.   HEENT: normal oral mucosa, no oral pallor and no oral cyanosis.   Neck: normal jugular venous A waves present, normal jugular venous V waves present and no jugular venous wilson A waves.   Pulmonary: no respiratory distress, normal respiratory rhythm and effort, no accessory muscle use and lungs were clear to auscultation bilaterally.   Cardiovascular: heart rate and rhythm were normal, normal S1 and S2 and no murmur, gallop, rub, heave or thrill are present.   Abdomen: soft, non-tender, no hepato-splenomegaly and no abdominal mass palpated.   Musculoskeletal: the gait could not be assessed..   Extremities: no clubbing of the fingernails, no localized cyanosis, no petechial hemorrhages and no ischemic changes.   Skin: normal skin color and pigmentation, no rash, no venous stasis, no skin lesions, no skin ulcer and no xanthoma was observed.   Psychiatric: oriented to person, place, and time, the affect was normal, the mood was normal and not feeling anxious.     LABS:   --------  02-24    133<L>  |  95<L>  |  77<H>  ----------------------------<  216<H>  4.7   |  26  |  8.40<H>    Ca    9.8      24 Feb 2023 06:34    TPro  6.7  /  Alb  3.2<L>  /  TBili  0.4  /  DBili  x   /  AST  9<L>  /  ALT  20  /  AlkPhos  133<H>  02-23                         15.5   7.02  )-----------( 213      ( 24 Feb 2023 06:34 )             47.7     PT/INR - ( 23 Feb 2023 13:20 )   PT: 9.6 sec;   INR: 0.82 ratio                     RADIOLOGY:  -----------------    ECG:     ECHO:

## 2023-02-25 NOTE — PROGRESS NOTE ADULT - PROVIDER SPECIALTY LIST ADULT
Palliative Care
Cardiology
Hospitalist
Nephrology
Nephrology
Palliative Care
Vascular Surgery
Nephrology
Palliative Care
Cardiology
Cardiology
Vascular Surgery
Hospitalist

## 2023-02-25 NOTE — PROGRESS NOTE ADULT - REASON FOR ADMISSION
hyperkalemia

## 2023-02-25 NOTE — DISCHARGE NOTE PROVIDER - NSDCCPCAREPLAN_GEN_ALL_CORE_FT
PRINCIPAL DISCHARGE DIAGNOSIS  Diagnosis: Hyperkalemia  Assessment and Plan of Treatment:       SECONDARY DISCHARGE DIAGNOSES  Diagnosis: Hyperkalemia  Assessment and Plan of Treatment:     Diagnosis: ESRD on dialysis  Assessment and Plan of Treatment:     Diagnosis: Dialysis AV fistula malfunction  Assessment and Plan of Treatment:

## 2023-02-25 NOTE — DISCHARGE NOTE PROVIDER - NSDCFUSCHEDAPPT_GEN_ALL_CORE_FT
[facility]  VASCULAR ELDER [address]  Scheduled Appointment: 03/07/2023

## 2023-02-25 NOTE — DISCHARGE NOTE NURSING/CASE MANAGEMENT/SOCIAL WORK - PATIENT PORTAL LINK FT
You can access the FollowMyHealth Patient Portal offered by [facility] by registering at the following website: [email or web address]. By joining [employer]’s FollowMyHealth portal, you will also be able to view your health information using other applications (apps) compatible with our system.

## 2023-02-25 NOTE — DISCHARGE NOTE NURSING/CASE MANAGEMENT/SOCIAL WORK - NSDCPEFALRISK_GEN_ALL_CORE
For information on Fall & Injury Prevention, visit: [email or web address] OR  [email or web address] OR  https://www.cdc.gov/steadi/patient.html

## 2023-02-25 NOTE — PROGRESS NOTE ADULT - PROBLEM SELECTOR PROBLEM 6
Chronic combined systolic and diastolic heart failure
Chronic combined systolic and diastolic heart failure

## 2023-02-25 NOTE — DISCHARGE NOTE NURSING/CASE MANAGEMENT/SOCIAL WORK - NSDCVIVACCINE_GEN_ALL_CORE_FT
Tdap; 27-Nov-2015 19:03; Bob Kahn (LEV); [employer]; [id number]; IntraMuscular; Deltoid Left.; 0.5 milliLiter(s); VIS (VIS Published: 09-May-2013, VIS Presented: 27-Nov-2015);

## 2023-02-25 NOTE — DISCHARGE NOTE PROVIDER - NSDCMRMEDTOKEN_GEN_ALL_CORE_FT
acetaminophen 325 mg oral tablet: 2 tab(s) orally every 6 hours, As needed, Temp greater or equal to 38C (100.4F), Mild Pain (1 - 3)  aspirin 81 mg oral delayed release tablet: 1 tab(s) orally once a day (at bedtime)  atorvastatin 40 mg oral tablet: 1 tab(s) orally once a day (at bedtime)  cloNIDine 0.1 mg oral tablet: 1 tab(s) orally 2 times a day  imipramine 50 mg oral tablet: 1 tab(s) orally once a day  insulin lispro 100 units/mL injectable solution: injectable 3 times a day (before meals) sliding scale   Melatonin 3 mg oral tablet: 1 tab(s) orally once a day (at bedtime), As Needed  metoprolol tartrate 100 mg oral tablet: 1 tab(s) orally once a day (at bedtime)  Nephro-Alfie oral tablet: 1 tab(s) orally once a day  PANTOPRAZOLE 40MG DR TAB: tab(s) orally once a day  risperiDONE 0.5 mg oral tablet: 1 tab(s) orally once a day (at bedtime)  Senna 8.6 mg oral tablet: 1 tab(s) orally once a day (at bedtime)  sodium zirconium cyclosilicate: 10 milligram(s) orally 2 times a week Wednesdays and Saturdays

## 2023-02-25 NOTE — PROGRESS NOTE ADULT - ASSESSMENT
71 year old female with PMH of Afib (not on AC for hx of multiple falls), T2DM, HTN, HLD, ESRD on HD (MWF), CHF, CAD s/p CABG, PAD S/P R-->L bifem-fem BK pop bypass, recent fempop bypass (6/21/2022), and partial ray amputation right great toe (6/22/2022) presented today from [facility] for fistulogram arranged by vascular sx , patient was not able to get intervention since she was found to be hyperkalemic K 6.5 and admitted for nephrology evaluation .Seen by cardiology cons .  Patient had podiatry sx surgery during previous hopsital admissions   Right hallux -Acute and chronic osteomyelitis. -Gangrenous skin and soft tissue. 2. Right first metatarsal/clean margin: -Minimal chronic osteomyelitis. Patient was on iv vancomycin with dialysis at [facility] and was followed by ID & PODIATRY at [facility]  (22 Feb 2023 17:23)    esrd on hd   Excess fluids and waste products will be removed from your blood; your electrolytes will be balanced; your blood pressure will be controlled.      ANEMIA PLAN:  Anemia of chronic disease:  Well controlled by Aranesp  H and H subtherapeutic .  We will continue Aranesp aiming for a HCT of 32-36 %.   We will monitor Iron stores, B12 and RBC folate .    hyperkalemia improved     BP monitoring,continue current antihypertensive meds, low salt diet,followup with PMD in 1-2 weeks    
Patient with PMH of Afib (not on AC for hx of multiple falls), T2DM, HTN, HLD, ESRD on HD (MWF), CHF, CAD s/p CABG, PAD S/P R-->L bifem-fem BK pop bypass, recent fempop bypass (6/21/2022), and partial ray amputation right great toe (6/22/2022) presented today from [facility] for fistulogram arranged by vascular sx , patient was not able to get intervention since she was found to be hyperkalemic K 6.5 and admitted for nephrology evaluation.    esrd  electrolyte imbalance  anemia  falls  ataxic gait  OA  DM  HTN  HLD  CHF  CAD  PAD    plan for OR with Vascular - AV fistula malfunction    blood gas noted  vascular note reviewed  cardio eval   renal eval  cvs rx regimen  correction of Lytes  monitor labs  monitor VS and HD  HD as per renal team  reassurance and supportive care  GOC discussion in progress - pt is unsure on what she wants in term of resuscitation, will follow
71 year old female with PMH of Afib (not on AC for hx of multiple falls), T2DM, HTN, HLD, ESRD on HD (MWF), CHF, CAD s/p CABG, PAD S/P R-->L bifem-fem BK pop bypass, recent fempop bypass (6/21/2022), and partial ray amputation right great toe (6/22/2022) presented today from [facility] for fistulogram arranged by vascular sx , patient was not able to get intervention since she was found to be hyperkalemic K 6.5 and admitted for nephrology evaluation .Seen by cardiology cons .  Patient had podiatry sx surgery during previous hopsital admissions   Right hallux -Acute and chronic osteomyelitis. -Gangrenous skin and soft tissue. 2. Right first metatarsal/clean margin: -Minimal chronic osteomyelitis. Patient was on iv vancomycin with dialysis at [facility] and was followed by ID & PODIATRY at [facility]  (22 Feb 2023 17:23)    esrd on hd   Excess fluids and waste products will be removed from your blood; your electrolytes will be balanced; your blood pressure will be controlled.      ANEMIA PLAN:  Anemia of chronic disease:  Well controlled by Aranesp  H and H subtherapeutic .  We will continue Aranesp aiming for a HCT of 32-36 %.   We will monitor Iron stores, B12 and RBC folate .    hyperkalemia improved     BP monitoring,continue current antihypertensive meds, low salt diet,followup with PMD in 1-2 weeks    
71 year old female with PMH of Afib (not on AC for hx of multiple falls), T2DM, HTN, HLD, ESRD on HD (MWF), CHF, CAD s/p CABG, PAD S/P R-->L bifem-fem BK pop bypass, recent fempop bypass (6/21/2022), and partial ray amputation right great toe (6/22/2022) presented today from [facility] for fistulogram arranged by vascular sx , patient was not able to get intervention since she was found to be hyperkalemic K 6.5 and admitted for nephrology evaluation .Seen by cardiology cons .  Patient had podiatry sx surgery during previous hopsi\[facility] [place] [facility]"" admissions   Right hallux -Acute and chronic osteomyelitis. -Gangrenous skin and soft tissue. 2. Right first metatarsal/clean margin: -Minimal chronic osteomyelitis. Patient was on iv vancomycin with dialysis at [facility] and was followed by ID & PODIATRY at [facility]  (22 Feb 2023 17:23)    esrd on hd   Excess fluids and waste products will be removed from your blood; your electrolytes will be balanced; your blood pressure will be controlled.      ANEMIA PLAN:  Anemia of chronic disease:  Well controlled by Aranesp  H and H subtherapeutic .  We will continue Aranesp aiming for a HCT of 32-36 %.   We will monitor Iron stores, B12 and RBC folate .    hyperkalemia kayexalate   lokelma  repeat k     BP monitoring,continue current antihypertensive meds, low salt diet,followup with PMD in 1-2 weeks    
Patient with PMH of Afib (not on AC for hx of multiple falls), T2DM, HTN, HLD, ESRD on HD (MWF), CHF, CAD s/p CABG, PAD S/P R-->L bifem-fem BK pop bypass, recent fempop bypass (6/21/2022), and partial ray amputation right great toe (6/22/2022) presented today from [facility] for fistulogram arranged by vascular sx , patient was not able to get intervention since she was found to be hyperkalemic K 6.5 and admitted for nephrology evaluation.    esrd  electrolyte imbalance  anemia  falls  ataxic gait  OA  DM  HTN  HLD  CHF  CAD  PAD    blood gas noted  vascular note reviewed  cardio eval   renal eval  cvs rx regimen  correction of Lytes  monitor labs  monitor VS and HD  HD as per renal team  reassurance and supportive care  GOC discussion in progress - pt is unsure on what she wants in term of resuscitation, will follow  
Patient with PMH of Afib (not on AC for hx of multiple falls), T2DM, HTN, HLD, ESRD on HD (MWF), CHF, CAD s/p CABG, PAD S/P R-->L bifem-fem BK pop bypass, recent fempop bypass (6/21/2022), and partial ray amputation right great toe (6/22/2022) presented today from [facility] for fistulogram arranged by vascular sx , patient was not able to get intervention since she was found to be hyperkalemic K 6.5 and admitted for nephrology evaluation.    esrd  electrolyte imbalance  anemia  falls  ataxic gait  OA  DM  HTN  HLD  CHF  CAD  PAD    vs noted  vascular follow up    blood gas noted  vascular note reviewed  cardio eval   renal eval  cvs rx regimen  correction of Lytes  monitor labs  monitor VS and HD  HD as per renal team  reassurance and supportive care  GOC discussion in progress - pt is unsure on what she wants in term of resuscitation, will follow
72 y/o female with PMHx of A-fib (not on AC for hx of multiple falls), T2DM, HTN, HLD, ESRD on HD (MWF), CHF, CAD s/p CABG, PAD S/P R-->L bifem-fem BK pop bypass, recent fempop bypass (6/21/2022), and partial ray amputation right great toe (6/22/2022), a/w hyperkalemia (was initially scheduled for a fistulogram with Dr. Webster 2/22).
pt with hyperkalemia- management as per renal   esrd - on hd  ashd   s/p mi  s/p coronary stent  s/p cabg  pvd- s/p bypass  hypertension  dyslipidemia  dm2  anemia-  no angina - no chf -after correction hyperkalemia- pt's cardisac status stable low  risk for repair of a- v fistula
pt with hyperkalemia- management as per renal   esrd - on hd  ashd   s/p mi  s/p coronary stent  s/p cabg  pvd- s/p bypass  hypertension  dyslipidemia  dm2  anemia-  no angina - no chf -after correction hyperkalemia- pt's cardisac status stable low  risk for repair of a- v fistula 2/24  to have fistulogram as out pt 2/25
pt with hyperkalemia- management as per renal   esrd - on hd  ashd   s/p mi  s/p coronary stent  s/p cabg  pvd- s/p bypass  hypertension  dyslipidemia  dm2  anemia-  no angina - no chf -after correction hyperkalemia- pt's cardisac status stable low  risk for repair of a- v fistula 2/24
71 year old female with PMH of Afib (not on AC for hx of multiple falls), T2DM, HTN, HLD, ESRD on HD (MWF), CHF, CAD s/p CABG, PAD S/P R-->L bifem-fem BK pop bypass, recent fempop bypass (6/21/2022), and partial ray amputation right great toe (6/22/2022) presented today from [facility] for fistulogram arranged by vascular sx , patient was not able to get intervention since she was found to be hyperkalemic K 6.5 and admitted for nephrology evaluation .Seen by cardiology cons .  Patient had podiatry sx surgery during previous hopsital admissions   Right hallux -Acute and chronic osteomyelitis. -Gangrenous skin and soft tissue. 2. Right first metatarsal/clean margin: -Minimal chronic osteomyelitis. Patient was on iv vancomycin with dialysis at [facility] and was followed by ID & PODIATRY at [facility] 
71 year old female with PMH of Afib (not on AC for hx of multiple falls), T2DM, HTN, HLD, ESRD on HD (MWF), CHF, CAD s/p CABG, PAD S/P R-->L bifem-fem BK pop bypass, recent fempop bypass (6/21/2022), and partial ray amputation right great toe (6/22/2022) presented today from [facility] for fistulogram arranged by vascular sx , patient was not able to get intervention since she was found to be hyperkalemic K 6.5 and admitted for nephrology evaluation .Seen by cardiology cons .  Patient had podiatry sx surgery during previous hopsital admissions   Right hallux -Acute and chronic osteomyelitis. -Gangrenous skin and soft tissue. 2. Right first metatarsal/clean margin: -Minimal chronic osteomyelitis. Patient was on iv vancomycin with dialysis at [facility] and was followed by ID & PODIATRY at [facility]

## 2023-02-25 NOTE — DISCHARGE NOTE PROVIDER - HOSPITAL COURSE
71 year old female with PMH of Afib (not on AC for hx of multiple falls), T2DM, HTN, HLD, ESRD on HD (MWF), CHF, CAD s/p CABG, PAD S/P R-->L bifem-fem BK pop bypass, recent fempop bypass (6/21/2022), and partial ray amputation right great toe (6/22/2022) presented today from [facility] for fistulogram arranged by vascular sx , patient was not able to get intervention since she was found to be hyperkalemic K 6.5 and admitted for nephrology evaluation .Seen by cardiology cons .  Patient had podiatry [facility]x surgery [facility] [place] [facility]   Right hallux -Acute and chronic osteomyelitis. -Gangrenous skin and soft tissue. 2. Right first metatarsal/clean margin: -Minimal chronic osteomyelitis. Patient was on iv vancomycin with dialysis at [facility] and was followed by ID & PODIATRY at [facility]     Problem/Plan - 1:  ·  Problem: Hyperkalemia.   ·  Plan: tx as per nephrologist , serial bmp ,EKG ,seen by cardiologist.    Problem/Plan - 2:  ·  Problem: Dialysis AV fistula malfunction.   ·  Plan: vascular surgery evaluation.    Problem/Plan - 3:  ·  Problem: ESRD on dialysis.   ·  Plan: HD as per nephrologist.    Problem/Plan - 4:  ·  Problem: CAD (coronary artery disease).   ·  Plan: continue home medications ,cardiology consult.    Problem/Plan - 5:  ·  Problem: Afib.   ·  Plan: continue home medications ,cardiology f/up.    Problem/Plan - 6:  ·  Problem: Chronic combined systolic and diastolic heart failure.   ·  Plan: monitor fluid status closely ,ins/outs ,HD as per nephrologist.    Problem/Plan - 7:  ·  Problem: Type 2 diabetes mellitus.   ·  Plan: Accu-Cheks monitoring and insulin corrective regimen  sliding scale coverage with short acting insulin, add long-acting insulin as needed ,no concentrated sweets diet, serial labs ,HbA1C,education.    Problem/Plan - 8:  ·  Problem: HTN (hypertension).   ·  Plan: continue home medications.    Problem/Plan - 9:  ·  Problem: Anemia secondary to renal failure.   ·  Plan: serial cbc ,anemia workup.    Problem/Plan - 10:  ·  Problem: PAD (peripheral artery disease).   ·  Plan; continue home medications ,vascular cons.    Problem/Plan - 11:  ·  Problem: Prophylactic measure.   ·  Plan: Gastrointestinal stress ulcer prophylaxis and DVT prophylaxis administered.

## 2023-02-25 NOTE — DISCHARGE NOTE PROVIDER - PROVIDER TOKENS
PROVIDER:[TOKEN:[[id number]],FOLLOWUP:[1 week]],PROVIDER:[TOKEN:[[id number]],FOLLOWUP:[1-3 days]]

## 2023-02-25 NOTE — PROGRESS NOTE ADULT - SUBJECTIVE AND OBJECTIVE BOX
Date/Time Patient Seen:  		  Referring MD:   Data Reviewed	       Patient is a 72y old  Female who presents with a chief complaint of hyperkalemia (24 Feb 2023 16:00)      Subjective/HPI     PAST MEDICAL & SURGICAL HISTORY:  Diabetes mellitus II    HTN (hypertension)    h/o Anxiety attack    Depression    h/o Myocardial infarct 2007    CAD (coronary artery disease)    CAD (coronary artery disease)    h/o Hepatitis A 1969  currently resolved, no symptoms    PAD (peripheral artery disease)    Murmur, cardiac    h/o Smoking  quitted 3/2012    CRF (chronic renal failure), unspecified stage    Dialysis patient    Anemia secondary to renal failure    HTN (hypertension)    Osteomyelitis    ESRD on dialysis    Falls    Ataxia    Type 2 diabetes mellitus    Peripheral vascular disease, unspecified    CAD (coronary artery disease)    coronary stent 2007    s/p Ovarian cyst removal    s/p surgical removal of benign Skin lesion epigastric area    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          Medication list         MEDICATIONS  (STANDING):  aspirin enteric coated 81 milliGRAM(s) Oral daily  atorvastatin 40 milliGRAM(s) Oral at bedtime  cloNIDine 0.1 milliGRAM(s) Oral two times a day  dextrose 5%. 1000 milliLiter(s) (100 mL/Hr) IV Continuous <Continuous>  dextrose 5%. 1000 milliLiter(s) (50 mL/Hr) IV Continuous <Continuous>  dextrose 50% Injectable 25 Gram(s) IV Push once  dextrose 50% Injectable 12.5 Gram(s) IV Push once  dextrose 50% Injectable 25 Gram(s) IV Push once  glucagon  Injectable 1 milliGRAM(s) IntraMuscular once  heparin   Injectable 5000 Unit(s) SubCutaneous every 12 hours  imipramine 50 milliGRAM(s) Oral daily  insulin lispro (ADMELOG) corrective regimen sliding scale   SubCutaneous at bedtime  insulin lispro (ADMELOG) corrective regimen sliding scale   SubCutaneous three times a day before meals  melatonin 3 milliGRAM(s) Oral at bedtime  metoprolol tartrate 100 milliGRAM(s) Oral at bedtime  multivitamin 1 Tablet(s) Oral daily  Nephro-elvie 1 Tablet(s) Oral daily  pantoprazole    Tablet 40 milliGRAM(s) Oral before breakfast  risperiDONE   Tablet 0.5 milliGRAM(s) Oral daily  senna 1 Tablet(s) Oral daily  sodium zirconium cyclosilicate 10 Gram(s) Oral daily    MEDICATIONS  (PRN):  acetaminophen     Tablet .. 650 milliGRAM(s) Oral every 6 hours PRN Temp greater or equal to 38C (100.4F), Mild Pain (1 - 3)  dextrose Oral Gel 15 Gram(s) Oral once PRN Blood Glucose LESS THAN 70 milliGRAM(s)/deciliter  ondansetron Injectable 4 milliGRAM(s) IV Push every 8 hours PRN Nausea and/or Vomiting         Vitals log        ICU Vital Signs Last 24 Hrs  T(C): 36.6 (25 Feb 2023 05:43), Max: 37.1 (24 Feb 2023 20:14)  T(F): 97.9 (25 Feb 2023 05:43), Max: 98.7 (24 Feb 2023 20:14)  HR: 67 (25 Feb 2023 05:43) (67 - 90)  BP: 135/74 (25 Feb 2023 05:43) (128/77 - 170/74)  BP(mean): --  ABP: --  ABP(mean): --  RR: 18 (25 Feb 2023 05:43) (15 - 18)  SpO2: 95% (25 Feb 2023 05:43) (95% - 98%)    O2 Parameters below as of 25 Feb 2023 05:43  Patient On (Oxygen Delivery Method): room air                 Input and Output:  I&O's Detail    23 Feb 2023 07:01  -  24 Feb 2023 07:00  --------------------------------------------------------  IN:  Total IN: 0 mL    OUT:    Other (mL): 1500 mL  Total OUT: 1500 mL    Total NET: -1500 mL          Lab Data                        15.5   7.02  )-----------( 213      ( 24 Feb 2023 06:34 )             47.7     02-24    133<L>  |  95<L>  |  77<H>  ----------------------------<  216<H>  4.7   |  26  |  8.40<H>    Ca    9.8      24 Feb 2023 06:34    TPro  6.7  /  Alb  3.2<L>  /  TBili  0.4  /  DBili  x   /  AST  9<L>  /  ALT  20  /  AlkPhos  133<H>  02-23            Review of Systems	      Objective     Physical Examination    heart s1s2  lung dec BS      Pertinent Lab findings & Imaging      Dexter:  NO   Adequate UO     I&O's Detail    23 Feb 2023 07:01  -  24 Feb 2023 07:00  --------------------------------------------------------  IN:  Total IN: 0 mL    OUT:    Other (mL): 1500 mL  Total OUT: 1500 mL    Total NET: -1500 mL               Discussed with:     Cultures:	        Radiology

## 2023-02-25 NOTE — DISCHARGE NOTE PROVIDER - DISCHARGE DIET
DASH Diet/Soft and Bite-Sized Diet/Consistent Carbohydrate Diabetic Diets/Renal Diets (for dialysis)

## 2023-02-25 NOTE — PROGRESS NOTE ADULT - SUBJECTIVE AND OBJECTIVE BOX
PROGRESS NOTE  Patient is a 72y old  Female who presents with a chief complaint of hyperkalemia (25 Feb 2023 10:16)      OVERNIGHT      HPI:  71 year old female with PMH of Afib (not on AC for hx of multiple falls), T2DM, HTN, HLD, ESRD on HD (MWF), CHF, CAD s/p CABG, PAD S/P R-->L bifem-fem BK pop bypass, recent fempop bypass (6/21/2022), and partial ray amputation right great toe (6/22/2022) presented today from [facility] for fistulogram arranged by vascular sx , patient was not able to get intervention since she was found to be hyperkalemic K 6.5 and admitted for nephrology evaluation .Seen by cardiology cons .  [facility]   Right hallux -Acute and chronic osteomyelitis. -Gangrenous skin and soft tissue. 2. Right first metatarsal/clean margin: -Minimal chronic osteomyelitis. Patient was on iv vancomycin with dialysis at [facility] and was followed by ID & PODIATRY at [facility]  (22 Feb 2023 17:23)    PAST MEDICAL & SURGICAL HISTORY:  HTN (hypertension)      h/o Anxiety attack      Depression      h/o Myocardial infarct 2007      h/o Hepatitis A 1969  currently resolved, no symptoms      Murmur, cardiac      h/o Smoking  quitted 3/2012      Anemia secondary to renal failure      ESRD on dialysis      Falls      Ataxia      Type 2 diabetes mellitus      Peripheral vascular disease, unspecified      CAD (coronary artery disease)      coronary stent 2007      s/p Ovarian cyst removal      s/p surgical removal of benign Skin lesion epigastric area      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 1000 milliLiter(s) (50 mL/Hr) IV Continuous <Continuous>  dextrose 5%. 1000 milliLiter(s) (100 mL/Hr) IV Continuous <Continuous>  dextrose 50% Injectable 25 Gram(s) IV Push once  dextrose 50% Injectable 25 Gram(s) IV Push once  dextrose 50% Injectable 12.5 Gram(s) IV Push once  glucagon  Injectable 1 milliGRAM(s) IntraMuscular once  heparin   Injectable 5000 Unit(s) SubCutaneous every 12 hours  imipramine 50 milliGRAM(s) Oral daily  insulin lispro (ADMELOG) corrective regimen sliding scale   SubCutaneous three times a day before meals  insulin lispro (ADMELOG) corrective regimen sliding scale   SubCutaneous at bedtime  melatonin 3 milliGRAM(s) Oral at bedtime  metoprolol tartrate 100 milliGRAM(s) Oral at bedtime  multivitamin 1 Tablet(s) Oral daily  Nephro-elvie 1 Tablet(s) Oral daily  pantoprazole    Tablet 40 milliGRAM(s) Oral before breakfast  risperiDONE   Tablet 0.5 milliGRAM(s) Oral daily  senna 1 Tablet(s) Oral daily  sodium zirconium cyclosilicate 10 Gram(s) Oral daily    MEDICATIONS  (PRN):  acetaminophen     Tablet .. 650 milliGRAM(s) Oral every 6 hours PRN Temp greater or equal to 38C (100.4F), Mild Pain (1 - 3)  dextrose Oral Gel 15 Gram(s) Oral once PRN Blood Glucose LESS THAN 70 milliGRAM(s)/deciliter  ondansetron Injectable 4 milliGRAM(s) IV Push every 8 hours PRN Nausea and/or Vomiting      OBJECTIVE    T(C): 36.4 (02-25-23 @ 11:13), Max: 37.1 (02-24-23 @ 20:14)  HR: 63 (02-25-23 @ 11:13) (63 - 90)  BP: 121/73 (02-25-23 @ 11:13) (121/73 - 170/74)  RR: 18 (02-25-23 @ 11:13) (15 - 18)  SpO2: 96% (02-25-23 @ 11:13) (95% - 98%)  Wt(kg): --  I&O's Summary        REVIEW OF SYSTEMS:  CONSTITUTIONAL: No fever, weight loss, or fatigue  EYES: No eye pain, visual disturbances, or discharge  ENMT:   No sinus or throat pain  NECK: No pain or stiffness  RESPIRATORY: No cough, wheezing, chills or hemoptysis; No shortness of breath  CARDIOVASCULAR: No chest pain, palpitations, dizziness, or leg swelling  GASTROINTESTINAL: No abdominal pain. No nausea, vomiting; No diarrhea or constipation. No melena or hematochezia.  GENITOURINARY: No dysuria, frequency, hematuria, or incontinence  NEUROLOGICAL: No headaches, memory loss, loss of strength, numbness, or tremors  SKIN: No itching, burning, rashes, or lesions   MUSCULOSKELETAL: No joint pain or swelling; No muscle, back, or extremity pain    PHYSICAL EXAM:  Appearance: NAD. VS past 24 hrs -as above   HEENT:   Moist oral mucosa. Conjunctiva clear b/l.   Neck : supple  Respiratory: Lungs CTAB.  Gastrointestinal:  Soft, nontender. No rebound. No rigidity. BS present	  Cardiovascular: RRR ,S1S2 present  Neurologic: Non-focal. Moving all extremities.  Extremities: No edema. No erythema. No calf tenderness.  Skin: No rashes, No ecchymoses, No cyanosis.	  wounds ,skin lesions-See skin assesment flow sheet   LABS:                        15.1   7.28  )-----------( 187      ( 25 Feb 2023 10:00 )             48.1     02-25    130<L>  |  94<L>  |  101<H>  ----------------------------<  218<H>  5.3   |  21<L>  |  10.00<H>    Ca    9.1      25 Feb 2023 10:00      CAPILLARY BLOOD GLUCOSE      POCT Blood Glucose.: 218 mg/dL (25 Feb 2023 08:12)  POCT Blood Glucose.: 307 mg/dL (24 Feb 2023 20:53)  POCT Blood Glucose.: 176 mg/dL (24 Feb 2023 17:20)  POCT Blood Glucose.: 201 mg/dL (24 Feb 2023 12:07)    PT/INR - ( 23 Feb 2023 13:20 )   PT: 9.6 sec;   INR: 0.82 ratio               RADIOLOGY & ADDITIONAL TESTS:   reviewed elctronically  ASSESSMENT/PLAN: 	     PROGRESS NOTE  Patient is a 72y old  Female who presents with a chief complaint of hyperkalemia (25 Feb 2023 10:16)    Chart and available morning labs /imaging are reviewed electronically , urgent issues addressed . More information  is being added upon completion of rounds , when more information is collected and management discussed with consultants , medical staff and social service/case management on the floor   OVERNIGHT  No new issues reported by medical staff . All above noted Patient is resting in a bed comfortably . .No distress noted   I was informed by vascular team/IR yesterday around 5 pm that patient may be discharged and fitulogram was canceled , f/up with Dr Smalls in the office recommended ( see surg pa note ) D/w Dr Perez and he advised to d/c patient back to [facility] after HD session with f/up with vascular specialist available at facility or outside provider depending on availability   HPI:  71 year old female with PMH of Afib (not on AC for hx of multiple falls), T2DM, HTN, HLD, ESRD on HD (MWF), CHF, CAD s/p CABG, PAD S/P R-->L bifem-fem BK pop bypass, recent fempop bypass (6/21/2022), and partial ray amputation right great toe (6/22/2022) presented today from [facility] for fistulogram arranged by vascular sx , patient was not able to get intervention since she was found to be hyperkalemic K 6.5 and admitted for nephrology evaluation .Seen by cardiology cons .  Patient had podiatry sx surgery during previous [facility] admissions   Right hallux -Acute and chronic osteomyelitis. -Gangrenous skin and soft tissue. 2. Right first metatarsal/clean margin: -Minimal chronic osteomyelitis. Patient was on iv vancomycin with dialysis at [facility] and was followed by ID & PODIATRY at [facility]  (22 Feb 2023 17:23)    PAST MEDICAL & SURGICAL HISTORY:  HTN (hypertension)      h/o Anxiety attack      Depression      h/o Myocardial infarct 2007      h/o Hepatitis A 1969  currently resolved, no symptoms      Murmur, cardiac      h/o Smoking  quitted 3/2012      Anemia secondary to renal failure      ESRD on dialysis      Falls      Ataxia      Type 2 diabetes mellitus      Peripheral vascular disease, unspecified      CAD (coronary artery disease)      coronary stent 2007      s/p Ovarian cyst removal      s/p surgical removal of benign Skin lesion epigastric area      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 1000 milliLiter(s) (50 mL/Hr) IV Continuous <Continuous>  dextrose 5%. 1000 milliLiter(s) (100 mL/Hr) IV Continuous <Continuous>  dextrose 50% Injectable 25 Gram(s) IV Push once  dextrose 50% Injectable 25 Gram(s) IV Push once  dextrose 50% Injectable 12.5 Gram(s) IV Push once  glucagon  Injectable 1 milliGRAM(s) IntraMuscular once  heparin   Injectable 5000 Unit(s) SubCutaneous every 12 hours  imipramine 50 milliGRAM(s) Oral daily  insulin lispro (ADMELOG) corrective regimen sliding scale   SubCutaneous three times a day before meals  insulin lispro (ADMELOG) corrective regimen sliding scale   SubCutaneous at bedtime  melatonin 3 milliGRAM(s) Oral at bedtime  metoprolol tartrate 100 milliGRAM(s) Oral at bedtime  multivitamin 1 Tablet(s) Oral daily  Nephro-elvie 1 Tablet(s) Oral daily  pantoprazole    Tablet 40 milliGRAM(s) Oral before breakfast  risperiDONE   Tablet 0.5 milliGRAM(s) Oral daily  senna 1 Tablet(s) Oral daily  sodium zirconium cyclosilicate 10 Gram(s) Oral daily    MEDICATIONS  (PRN):  acetaminophen     Tablet .. 650 milliGRAM(s) Oral every 6 hours PRN Temp greater or equal to 38C (100.4F), Mild Pain (1 - 3)  dextrose Oral Gel 15 Gram(s) Oral once PRN Blood Glucose LESS THAN 70 milliGRAM(s)/deciliter  ondansetron Injectable 4 milliGRAM(s) IV Push every 8 hours PRN Nausea and/or Vomiting      OBJECTIVE    T(C): 36.4 (02-25-23 @ 11:13), Max: 37.1 (02-24-23 @ 20:14)  HR: 63 (02-25-23 @ 11:13) (63 - 90)  BP: 121/73 (02-25-23 @ 11:13) (121/73 - 170/74)  RR: 18 (02-25-23 @ 11:13) (15 - 18)  SpO2: 96% (02-25-23 @ 11:13) (95% - 98%)  Wt(kg): --  I&O's Summary        REVIEW OF SYSTEMS:  CONSTITUTIONAL: No fever, weight loss, or fatigue  EYES: No eye pain, visual disturbances, or discharge  ENMT:   No sinus or throat pain  NECK: No pain or stiffness  RESPIRATORY: No cough, wheezing, chills or hemoptysis; No shortness of breath  CARDIOVASCULAR: No chest pain, palpitations, dizziness, or leg swelling  GASTROINTESTINAL: No abdominal pain. No nausea, vomiting; No diarrhea or constipation. No melena or hematochezia.  GENITOURINARY: No dysuria, frequency, hematuria, or incontinence  NEUROLOGICAL: No headaches, memory loss, loss of strength, numbness, or tremors  SKIN: No itching, burning, rashes, or lesions   MUSCULOSKELETAL: No joint pain or swelling; No muscle, back, or extremity pain    PHYSICAL EXAM:  Appearance: NAD. VS past 24 hrs -as above   HEENT:   Moist oral mucosa. Conjunctiva clear b/l.   Neck : supple  Respiratory: Lungs CTAB.  Gastrointestinal:  Soft, nontender. No rebound. No rigidity. BS present	  Cardiovascular: RRR ,S1S2 present  Neurologic: Non-focal. Moving all extremities.  Extremities: No edema. No erythema. No calf tenderness.  Skin: No rashes, No ecchymoses, No cyanosis.	  wounds ,skin lesions-See skin assesment flow sheet   LABS:                        15.1   7.28  )-----------( 187      ( 25 Feb 2023 10:00 )             48.1     02-25    130<L>  |  94<L>  |  101<H>  ----------------------------<  218<H>  5.3   |  21<L>  |  10.00<H>    Ca    9.1      25 Feb 2023 10:00      CAPILLARY BLOOD GLUCOSE      POCT Blood Glucose.: 218 mg/dL (25 Feb 2023 08:12)  POCT Blood Glucose.: 307 mg/dL (24 Feb 2023 20:53)  POCT Blood Glucose.: 176 mg/dL (24 Feb 2023 17:20)  POCT Blood Glucose.: 201 mg/dL (24 Feb 2023 12:07)    PT/INR - ( 23 Feb 2023 13:20 )   PT: 9.6 sec;   INR: 0.82 ratio               RADIOLOGY & ADDITIONAL TESTS:   reviewed elctronically  ASSESSMENT/PLAN: 	    Patient was seen and examined on a day of discharge . Plan of care , discharge medications and recommendations discussed with consultants and clearance for discharge obtained .Social service , case management  and medical staff are aware of plan. Family is notified. Discharge summary  is  prepared electronically-see separate document prepared by me .75minutes spent on this visit, 50% visit time spent in care co-ordination with other attendings and counselling patient  I have discussed care plan with patient and HCP,expressed understanding of problems treatment and their effect and side effects, alternatives in detail,I have asked if they have any questions and concerns and appropriately addressed them to best of my ability Left a message for mino Decker

## 2023-02-25 NOTE — PROGRESS NOTE ADULT - SUBJECTIVE AND OBJECTIVE BOX
Patient is a 72y Female whom presented to the hospital with esrd on hd     PAST MEDICAL & SURGICAL HISTORY:  HTN (hypertension)      h/o Anxiety attack      Depression      h/o Myocardial infarct 2007      h/o Hepatitis A 1969  currently resolved, no symptoms      Murmur, cardiac      h/o Smoking  quitted 3/2012      Anemia secondary to renal failure      ESRD on dialysis      Falls      Ataxia      Type 2 diabetes mellitus      Peripheral vascular disease, unspecified      CAD (coronary artery disease)      coronary stent 2007      s/p Ovarian cyst removal      s/p surgical removal of benign Skin lesion epigastric area      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 1000 milliLiter(s) (50 mL/Hr) IV Continuous <Continuous>  dextrose 5%. 1000 milliLiter(s) (100 mL/Hr) IV Continuous <Continuous>  dextrose 50% Injectable 25 Gram(s) IV Push once  dextrose 50% Injectable 12.5 Gram(s) IV Push once  dextrose 50% Injectable 25 Gram(s) IV Push once  glucagon  Injectable 1 milliGRAM(s) IntraMuscular once  heparin   Injectable 5000 Unit(s) SubCutaneous every 12 hours  imipramine 50 milliGRAM(s) Oral daily  insulin lispro (ADMELOG) corrective regimen sliding scale   SubCutaneous three times a day before meals  melatonin 3 milliGRAM(s) Oral at bedtime  metoprolol tartrate 100 milliGRAM(s) Oral at bedtime  multivitamin 1 Tablet(s) Oral daily  pantoprazole    Tablet 40 milliGRAM(s) Oral before breakfast  risperiDONE   Tablet 0.5 milliGRAM(s) Oral daily  senna 1 Tablet(s) Oral daily      Allergies    latex (Hives)  No Known Drug Allergies    Intolerances        SOCIAL HISTORY:  Denies ETOh,Smoking,     FAMILY HISTORY:  FH: myocardial infarction (Father)    FH: myocardial infarction (Father)    Family history of type 2 diabetes mellitus in brother (Sibling)        REVIEW OF SYSTEMS:    CONSTITUTIONAL: No weakness, fevers or chills  RESPIRATORY: No cough, wheezing, hemoptysis; No shortness of breath  CARDIOVASCULAR: No chest pain or palpitations  GASTROINTESTINAL: No abdominal or epigastric pain. No nausea, vomiting,     No diarrhea or constipation. No melena   GENITOURINARY: No dysuria, frequency or hematuria  NEUROLOGICAL: No numbness or weakness  SKIN: dry                                     15.1   7.28  )-----------( 187      ( 25 Feb 2023 10:00 )             48.1       CBC Full  -  ( 25 Feb 2023 10:00 )  WBC Count : 7.28 K/uL  RBC Count : 4.99 M/uL  Hemoglobin : 15.1 g/dL  Hematocrit : 48.1 %  Platelet Count - Automated : 187 K/uL  Mean Cell Volume : 96.4 fl  Mean Cell Hemoglobin : 30.3 pg  Mean Cell Hemoglobin Concentration : 31.4 gm/dL  Auto Neutrophil # : x  Auto Lymphocyte # : x  Auto Monocyte # : x  Auto Eosinophil # : x  Auto Basophil # : x  Auto Neutrophil % : x  Auto Lymphocyte % : x  Auto Monocyte % : x  Auto Eosinophil % : x  Auto Basophil % : x      02-25    130<L>  |  94<L>  |  101<H>  ----------------------------<  218<H>  5.3   |  21<L>  |  10.00<H>    Ca    9.1      25 Feb 2023 10:00        CAPILLARY BLOOD GLUCOSE      POCT Blood Glucose.: 178 mg/dL (25 Feb 2023 12:01)  POCT Blood Glucose.: 218 mg/dL (25 Feb 2023 08:12)  POCT Blood Glucose.: 307 mg/dL (24 Feb 2023 20:53)  POCT Blood Glucose.: 176 mg/dL (24 Feb 2023 17:20)      Vital Signs Last 24 Hrs  T(C): 36.4 (25 Feb 2023 11:13), Max: 37.1 (24 Feb 2023 20:14)  T(F): 97.6 (25 Feb 2023 11:13), Max: 98.7 (24 Feb 2023 20:14)  HR: 63 (25 Feb 2023 11:13) (63 - 90)  BP: 121/73 (25 Feb 2023 11:13) (121/73 - 170/74)  BP(mean): --  RR: 18 (25 Feb 2023 11:13) (17 - 18)  SpO2: 96% (25 Feb 2023 11:13) (95% - 98%)    Parameters below as of 25 Feb 2023 11:13  Patient On (Oxygen Delivery Method): room air                                                           PHYSICAL EXAM:    Constitutional: NAD  HEENT: conjunctive   clear   Neck:  No JVD  Respiratory: CTAB  Cardiovascular: S1 and S2  Gastrointestinal: BS+, soft, NT/ND  Extremities: No peripheral edema  Neurological:  no focal deficits  Psychiatric: Normal mood, normal affect  : No Renteria  Skin: No rashes  Access: pos fistula

## 2023-02-25 NOTE — DISCHARGE NOTE PROVIDER - NSDCCAREPROVSEEN_GEN_ALL_CORE_FT
Jacob, Joseluis Alcala, Serenity Galindo, Sam Guthrie, Barbara Christensen, Sayra Coppola, Juanita Larson, Diamond Oneill, Manuelito Landeros, Art Webster, Isaias Monson, Lex Mehta, Stanley Weil, Roxana

## 2023-02-25 NOTE — PROGRESS NOTE ADULT - PROBLEM SELECTOR PLAN 7
Accu-Cheks monitoring and insulin corrective regimen  sliding scale coverage with short acting insulin, add long-acting insulin as needed ,no concentrated sweets diet, serial labs ,HbA1C,education
Accu-Cheks monitoring and insulin corrective regimen  sliding scale coverage with short acting insulin, add long-acting insulin as needed ,no concentrated sweets diet, serial labs ,HbA1C,education

## 2023-02-25 NOTE — PROGRESS NOTE ADULT - PROBLEM SELECTOR PLAN 6
monitor fluid status closely ,ins/outs ,HD as per nephrologist
monitor fluid status closely ,ins/outs ,HD as per nephrologist

## 2023-02-25 NOTE — SOCIAL WORK PROGRESS NOTE - NSSWPROGRESSNOTE_GEN_ALL_CORE
TORIN met with pt at bedside. Pt to be discharged back to [facility] today. Transport via [facility] at 4pm. TORIN left a message for pts son on phone.

## 2023-02-25 NOTE — PROGRESS NOTE ADULT - NSHPATTENDINGPLANDISCUSS_GEN_ALL_CORE
pt and Dr Perez about HD management
Pt, Renal and cardio.
Pt, renal, RN and resident.
Pt, RN and medical staff.
Yes

## 2023-02-25 NOTE — DISCHARGE NOTE PROVIDER - CARE PROVIDER_API CALL
Justine Rose)  Vascular Surgery  [address] [place] [address]  Phone: [phone]  Fax: [phone]  Follow Up Time: 1 week    Pahlavan, Mohsen (MD)  Medicine  [address] [place] [address], [address]  [place] [address]  Phone: [phone]  Fax: [phone]  Follow Up Time: 1-3 days

## 2023-02-25 NOTE — DISCHARGE NOTE NURSING/CASE MANAGEMENT/SOCIAL WORK - NSDPFAC_GEN_ALL_CORE
Patient to be discharged to [facility] at [facility]. # #[phone]

## 2023-03-01 RX ORDER — RISPERIDONE 4 MG/1
1 TABLET ORAL
Qty: 0 | Refills: 0 | DISCHARGE

## 2023-03-02 PROBLEM — E11.9 TYPE 2 DIABETES MELLITUS WITHOUT COMPLICATIONS: Chronic | Status: ACTIVE | Noted: 2023-03-01

## 2023-03-07 ENCOUNTER — APPOINTMENT (OUTPATIENT)
Dept: VASCULAR SURGERY | Facility: HOSPITAL | Age: 73
End: 2023-03-07

## 2023-03-07 ENCOUNTER — INPATIENT (INPATIENT)
Facility: HOSPITAL | Age: 73
LOS: 3 days | Discharge: HOME | DRG: 299 | End: 2023-03-11
Attending: INTERNAL MEDICINE | Admitting: INTERNAL MEDICINE
Payer: MEDICARE

## 2023-03-07 VITALS
TEMPERATURE: 98 F | HEART RATE: 84 BPM | WEIGHT: 110.89 LBS | SYSTOLIC BLOOD PRESSURE: 140 MMHG | RESPIRATION RATE: 18 BRPM | HEIGHT: 69 IN | OXYGEN SATURATION: 97 % | DIASTOLIC BLOOD PRESSURE: 60 MMHG

## 2023-03-07 DIAGNOSIS — Z29.9 ENCOUNTER FOR PROPHYLACTIC MEASURES, UNSPECIFIED: ICD-10-CM

## 2023-03-07 DIAGNOSIS — I10 ESSENTIAL (PRIMARY) HYPERTENSION: ICD-10-CM

## 2023-03-07 DIAGNOSIS — R27.0 ATAXIA, UNSPECIFIED: ICD-10-CM

## 2023-03-07 DIAGNOSIS — I48.91 UNSPECIFIED ATRIAL FIBRILLATION: ICD-10-CM

## 2023-03-07 DIAGNOSIS — F32.9 MAJOR DEPRESSIVE DISORDER, SINGLE EPISODE, UNSPECIFIED: ICD-10-CM

## 2023-03-07 DIAGNOSIS — Z89.411 ACQUIRED ABSENCE OF RIGHT GREAT TOE: Chronic | ICD-10-CM

## 2023-03-07 DIAGNOSIS — I73.9 PERIPHERAL VASCULAR DISEASE, UNSPECIFIED: ICD-10-CM

## 2023-03-07 DIAGNOSIS — N18.6 END STAGE RENAL DISEASE: ICD-10-CM

## 2023-03-07 DIAGNOSIS — I25.10 ATHEROSCLEROTIC HEART DISEASE OF NATIVE CORONARY ARTERY WITHOUT ANGINA PECTORIS: ICD-10-CM

## 2023-03-07 DIAGNOSIS — E11.9 TYPE 2 DIABETES MELLITUS WITHOUT COMPLICATIONS: ICD-10-CM

## 2023-03-07 LAB
ALBUMIN SERPL ELPH-MCNC: 3.2 G/DL — LOW (ref 3.3–5)
ALP SERPL-CCNC: 112 U/L — SIGNIFICANT CHANGE UP (ref 40–120)
ALT FLD-CCNC: 9 U/L — LOW (ref 12–78)
ANION GAP SERPL CALC-SCNC: 7 MMOL/L — SIGNIFICANT CHANGE UP (ref 5–17)
APTT BLD: 26.6 SEC — LOW (ref 27.5–35.5)
AST SERPL-CCNC: 17 U/L — SIGNIFICANT CHANGE UP (ref 15–37)
BASOPHILS # BLD AUTO: 0.05 K/UL — SIGNIFICANT CHANGE UP (ref 0–0.2)
BASOPHILS NFR BLD AUTO: 0.6 % — SIGNIFICANT CHANGE UP (ref 0–2)
BILIRUB SERPL-MCNC: 0.4 MG/DL — SIGNIFICANT CHANGE UP (ref 0.2–1.2)
BUN SERPL-MCNC: 92 MG/DL — HIGH (ref 7–23)
CALCIUM SERPL-MCNC: 8.7 MG/DL — SIGNIFICANT CHANGE UP (ref 8.5–10.1)
CHLORIDE SERPL-SCNC: 98 MMOL/L — SIGNIFICANT CHANGE UP (ref 96–108)
CO2 SERPL-SCNC: 27 MMOL/L — SIGNIFICANT CHANGE UP (ref 22–31)
CREAT SERPL-MCNC: 8.5 MG/DL — HIGH (ref 0.5–1.3)
EGFR: 5 ML/MIN/1.73M2 — LOW
EOSINOPHIL # BLD AUTO: 0.42 K/UL — SIGNIFICANT CHANGE UP (ref 0–0.5)
EOSINOPHIL NFR BLD AUTO: 4.9 % — SIGNIFICANT CHANGE UP (ref 0–6)
GLUCOSE SERPL-MCNC: 209 MG/DL — HIGH (ref 70–99)
HCT VFR BLD CALC: 33.5 % — LOW (ref 34.5–45)
HGB BLD-MCNC: 10.9 G/DL — LOW (ref 11.5–15.5)
IMM GRANULOCYTES NFR BLD AUTO: 0.8 % — SIGNIFICANT CHANGE UP (ref 0–0.9)
INR BLD: 0.95 RATIO — SIGNIFICANT CHANGE UP (ref 0.88–1.16)
LYMPHOCYTES # BLD AUTO: 0.97 K/UL — LOW (ref 1–3.3)
LYMPHOCYTES # BLD AUTO: 11.3 % — LOW (ref 13–44)
MCHC RBC-ENTMCNC: 30.7 PG — SIGNIFICANT CHANGE UP (ref 27–34)
MCHC RBC-ENTMCNC: 32.5 GM/DL — SIGNIFICANT CHANGE UP (ref 32–36)
MCV RBC AUTO: 94.4 FL — SIGNIFICANT CHANGE UP (ref 80–100)
MONOCYTES # BLD AUTO: 1.11 K/UL — HIGH (ref 0–0.9)
MONOCYTES NFR BLD AUTO: 12.9 % — SIGNIFICANT CHANGE UP (ref 2–14)
NEUTROPHILS # BLD AUTO: 5.99 K/UL — SIGNIFICANT CHANGE UP (ref 1.8–7.4)
NEUTROPHILS NFR BLD AUTO: 69.5 % — SIGNIFICANT CHANGE UP (ref 43–77)
NRBC # BLD: 0 /100 WBCS — SIGNIFICANT CHANGE UP (ref 0–0)
PLATELET # BLD AUTO: 166 K/UL — SIGNIFICANT CHANGE UP (ref 150–400)
POTASSIUM SERPL-MCNC: 4.9 MMOL/L — SIGNIFICANT CHANGE UP (ref 3.5–5.3)
POTASSIUM SERPL-SCNC: 4.9 MMOL/L — SIGNIFICANT CHANGE UP (ref 3.5–5.3)
PROT SERPL-MCNC: 6.8 G/DL — SIGNIFICANT CHANGE UP (ref 6–8.3)
PROTHROM AB SERPL-ACNC: 11.1 SEC — SIGNIFICANT CHANGE UP (ref 10.5–13.4)
RBC # BLD: 3.55 M/UL — LOW (ref 3.8–5.2)
RBC # FLD: 13 % — SIGNIFICANT CHANGE UP (ref 10.3–14.5)
SARS-COV-2 RNA SPEC QL NAA+PROBE: SIGNIFICANT CHANGE UP
SODIUM SERPL-SCNC: 132 MMOL/L — LOW (ref 135–145)
WBC # BLD: 8.61 K/UL — SIGNIFICANT CHANGE UP (ref 3.8–10.5)
WBC # FLD AUTO: 8.61 K/UL — SIGNIFICANT CHANGE UP (ref 3.8–10.5)

## 2023-03-07 PROCEDURE — 71045 X-RAY EXAM CHEST 1 VIEW: CPT | Mod: 26

## 2023-03-07 PROCEDURE — 99285 EMERGENCY DEPT VISIT HI MDM: CPT | Mod: FS

## 2023-03-07 RX ORDER — LANOLIN ALCOHOL/MO/W.PET/CERES
3 CREAM (GRAM) TOPICAL AT BEDTIME
Refills: 0 | Status: DISCONTINUED | OUTPATIENT
Start: 2023-03-07 | End: 2023-03-11

## 2023-03-07 RX ORDER — DEXTROSE 50 % IN WATER 50 %
12.5 SYRINGE (ML) INTRAVENOUS ONCE
Refills: 0 | Status: DISCONTINUED | OUTPATIENT
Start: 2023-03-07 | End: 2023-03-08

## 2023-03-07 RX ORDER — ASPIRIN/CALCIUM CARB/MAGNESIUM 324 MG
81 TABLET ORAL DAILY
Refills: 0 | Status: DISCONTINUED | OUTPATIENT
Start: 2023-03-07 | End: 2023-03-11

## 2023-03-07 RX ORDER — SENNA PLUS 8.6 MG/1
2 TABLET ORAL AT BEDTIME
Refills: 0 | Status: DISCONTINUED | OUTPATIENT
Start: 2023-03-07 | End: 2023-03-11

## 2023-03-07 RX ORDER — TRAMADOL HYDROCHLORIDE 50 MG/1
50 TABLET ORAL
Refills: 0 | Status: DISCONTINUED | OUTPATIENT
Start: 2023-03-07 | End: 2023-03-11

## 2023-03-07 RX ORDER — INSULIN LISPRO 100/ML
VIAL (ML) SUBCUTANEOUS
Refills: 0 | Status: DISCONTINUED | OUTPATIENT
Start: 2023-03-07 | End: 2023-03-08

## 2023-03-07 RX ORDER — DEXTROSE 50 % IN WATER 50 %
15 SYRINGE (ML) INTRAVENOUS ONCE
Refills: 0 | Status: DISCONTINUED | OUTPATIENT
Start: 2023-03-07 | End: 2023-03-08

## 2023-03-07 RX ORDER — ATORVASTATIN CALCIUM 80 MG/1
40 TABLET, FILM COATED ORAL AT BEDTIME
Refills: 0 | Status: DISCONTINUED | OUTPATIENT
Start: 2023-03-07 | End: 2023-03-11

## 2023-03-07 RX ORDER — ERYTHROPOIETIN 10000 [IU]/ML
10000 INJECTION, SOLUTION INTRAVENOUS; SUBCUTANEOUS
Refills: 0 | Status: DISCONTINUED | OUTPATIENT
Start: 2023-03-08 | End: 2023-03-11

## 2023-03-07 RX ORDER — DEXTROSE 50 % IN WATER 50 %
25 SYRINGE (ML) INTRAVENOUS ONCE
Refills: 0 | Status: DISCONTINUED | OUTPATIENT
Start: 2023-03-07 | End: 2023-03-08

## 2023-03-07 RX ORDER — METOPROLOL TARTRATE 50 MG
100 TABLET ORAL AT BEDTIME
Refills: 0 | Status: DISCONTINUED | OUTPATIENT
Start: 2023-03-07 | End: 2023-03-11

## 2023-03-07 RX ORDER — ONDANSETRON 8 MG/1
4 TABLET, FILM COATED ORAL EVERY 8 HOURS
Refills: 0 | Status: DISCONTINUED | OUTPATIENT
Start: 2023-03-07 | End: 2023-03-11

## 2023-03-07 RX ORDER — GLUCAGON INJECTION, SOLUTION 0.5 MG/.1ML
1 INJECTION, SOLUTION SUBCUTANEOUS ONCE
Refills: 0 | Status: DISCONTINUED | OUTPATIENT
Start: 2023-03-07 | End: 2023-03-08

## 2023-03-07 RX ORDER — SODIUM CHLORIDE 9 MG/ML
1000 INJECTION, SOLUTION INTRAVENOUS
Refills: 0 | Status: DISCONTINUED | OUTPATIENT
Start: 2023-03-07 | End: 2023-03-08

## 2023-03-07 RX ORDER — INSULIN GLARGINE 100 [IU]/ML
5 INJECTION, SOLUTION SUBCUTANEOUS AT BEDTIME
Refills: 0 | Status: DISCONTINUED | OUTPATIENT
Start: 2023-03-07 | End: 2023-03-11

## 2023-03-07 RX ORDER — INSULIN LISPRO 100/ML
VIAL (ML) SUBCUTANEOUS AT BEDTIME
Refills: 0 | Status: DISCONTINUED | OUTPATIENT
Start: 2023-03-07 | End: 2023-03-08

## 2023-03-07 RX ORDER — ACETAMINOPHEN 500 MG
650 TABLET ORAL EVERY 6 HOURS
Refills: 0 | Status: DISCONTINUED | OUTPATIENT
Start: 2023-03-07 | End: 2023-03-11

## 2023-03-07 RX ORDER — PANTOPRAZOLE SODIUM 20 MG/1
40 TABLET, DELAYED RELEASE ORAL
Refills: 0 | Status: DISCONTINUED | OUTPATIENT
Start: 2023-03-07 | End: 2023-03-11

## 2023-03-07 RX ADMIN — ATORVASTATIN CALCIUM 40 MILLIGRAM(S): 80 TABLET, FILM COATED ORAL at 22:32

## 2023-03-07 RX ADMIN — Medication 3 MILLIGRAM(S): at 22:39

## 2023-03-07 RX ADMIN — Medication 100 MILLIGRAM(S): at 22:32

## 2023-03-07 RX ADMIN — Medication 0.1 MILLIGRAM(S): at 17:13

## 2023-03-07 RX ADMIN — SENNA PLUS 2 TABLET(S): 8.6 TABLET ORAL at 22:32

## 2023-03-07 RX ADMIN — Medication 2: at 17:13

## 2023-03-07 RX ADMIN — INSULIN GLARGINE 5 UNIT(S): 100 INJECTION, SOLUTION SUBCUTANEOUS at 22:32

## 2023-03-07 NOTE — H&P ADULT - ASSESSMENT
72-year-old female with history of A-fib, diabetes, hypertension, hyperlipidemia, end-stage renal disease hemodialysis, CHF, CAD/CABG, PVD sent in from [facility] for admission for right lower extremity angiogram ,patient is scheduled by vascular team for intervention tomorrow and requires cardiology clearance ,labs ,EKG and chest xray .  Patient otherwise feeling well and has no complaints. Last dialysis - yesterday. Patient was recently hospitaliazed with malfunctioning HD fistula , nephrologist contacted . Seen  by vascular teeam 02/24 during previous admission PAD S/P R-->L bifem-fem BK pop bypass, recent fempop bypass (6/21/2022), and partial ray amputation right great toe (6/22/2022), a/w hyperkalemia (was initially scheduled for a fistulogram with Dr. Webster 2/22).

## 2023-03-07 NOTE — ED PROVIDER NOTE - NSICDXPASTMEDICALHX_GEN_ALL_CORE_FT
PAST MEDICAL HISTORY:  Anemia secondary to renal failure     Ataxia     CAD (coronary artery disease)     Depression     Diabetes     ESRD on dialysis     Falls     h/o Anxiety attack     h/o Hepatitis A 1969 currently resolved, no symptoms    h/o Myocardial infarct 2007     h/o Smoking quitted 3/2012    HTN (hypertension)     Murmur, cardiac     Peripheral vascular disease, unspecified     Type 2 diabetes mellitus

## 2023-03-07 NOTE — ED ADULT NURSE NOTE - BEFAST ARM NUMBNESS
Azithromycin Counseling:  I discussed with the patient the risks of azithromycin including but not limited to GI upset, allergic reaction, drug rash, diarrhea, and yeast infections. Detail Level: Detailed Minocycline Counseling: Patient advised regarding possible photosensitivity and discoloration of the teeth, skin, lips, tongue and gums.  Patient instructed to avoid sunlight, if possible.  When exposed to sunlight, patients should wear protective clothing, sunglasses, and sunscreen.  The patient was instructed to call the office immediately if the following severe adverse effects occur:  hearing changes, easy bruising/bleeding, severe headache, or vision changes.  The patient verbalized understanding of the proper use and possible adverse effects of minocycline.  All of the patient's questions and concerns were addressed. Erythromycin Pregnancy And Lactation Text: This medication is Pregnancy Category B and is considered safe during pregnancy. It is also excreted in breast milk. Tetracycline Pregnancy And Lactation Text: This medication is Pregnancy Category D and not consider safe during pregnancy. It is also excreted in breast milk. Isotretinoin Pregnancy And Lactation Text: This medication is Pregnancy Category X and is considered extremely dangerous during pregnancy. It is unknown if it is excreted in breast milk. Topical Clindamycin Pregnancy And Lactation Text: This medication is Pregnancy Category B and is considered safe during pregnancy. It is unknown if it is excreted in breast milk. No Use Enhanced Medication Counseling?: No Azithromycin Pregnancy And Lactation Text: This medication is considered safe during pregnancy and is also secreted in breast milk. Bactrim Pregnancy And Lactation Text: This medication is Pregnancy Category D and is known to cause fetal risk.  It is also excreted in breast milk. Topical Clindamycin Counseling: Patient counseled that this medication may cause skin irritation or allergic reactions.  In the event of skin irritation, the patient was advised to reduce the amount of the drug applied or use it less frequently.   The patient verbalized understanding of the proper use and possible adverse effects of clindamycin.  All of the patient's questions and concerns were addressed. Benzoyl Peroxide Pregnancy And Lactation Text: This medication is Pregnancy Category C. It is unknown if benzoyl peroxide is excreted in breast milk. High Dose Vitamin A Counseling: Side effects reviewed, pt to contact office should one occur. Topical Sulfur Applications Counseling: Topical Sulfur Counseling: Patient counseled that this medication may cause skin irritation or allergic reactions.  In the event of skin irritation, the patient was advised to reduce the amount of the drug applied or use it less frequently.   The patient verbalized understanding of the proper use and possible adverse effects of topical sulfur application.  All of the patient's questions and concerns were addressed. Tazorac Pregnancy And Lactation Text: This medication is not safe during pregnancy. It is unknown if this medication is excreted in breast milk. Sarecycline Counseling: Patient advised regarding possible photosensitivity and discoloration of the teeth, skin, lips, tongue and gums.  Patient instructed to avoid sunlight, if possible.  When exposed to sunlight, patients should wear protective clothing, sunglasses, and sunscreen.  The patient was instructed to call the office immediately if the following severe adverse effects occur:  hearing changes, easy bruising/bleeding, severe headache, or vision changes.  The patient verbalized understanding of the proper use and possible adverse effects of sarecycline.  All of the patient's questions and concerns were addressed. High Dose Vitamin A Pregnancy And Lactation Text: High dose vitamin A therapy is contraindicated during pregnancy and breast feeding. Dapsone Pregnancy And Lactation Text: This medication is Pregnancy Category C and is not considered safe during pregnancy or breast feeding. Erythromycin Counseling:  I discussed with the patient the risks of erythromycin including but not limited to GI upset, allergic reaction, drug rash, diarrhea, increase in liver enzymes, and yeast infections. Doxycycline Counseling:  Patient counseled regarding possible photosensitivity and increased risk for sunburn.  Patient instructed to avoid sunlight, if possible.  When exposed to sunlight, patients should wear protective clothing, sunglasses, and sunscreen.  The patient was instructed to call the office immediately if the following severe adverse effects occur:  hearing changes, easy bruising/bleeding, severe headache, or vision changes.  The patient verbalized understanding of the proper use and possible adverse effects of doxycycline.  All of the patient's questions and concerns were addressed. Spironolactone Pregnancy And Lactation Text: This medication can cause feminization of the male fetus and should be avoided during pregnancy. The active metabolite is also found in breast milk. Birth Control Pills Pregnancy And Lactation Text: This medication should be avoided if pregnant and for the first 30 days post-partum. Topical Retinoid counseling:  Patient advised to apply a pea-sized amount only at bedtime and wait 30 minutes after washing their face before applying.  If too drying, patient may add a non-comedogenic moisturizer. The patient verbalized understanding of the proper use and possible adverse effects of retinoids.  All of the patient's questions and concerns were addressed. Bactrim Counseling:  I discussed with the patient the risks of sulfa antibiotics including but not limited to GI upset, allergic reaction, drug rash, diarrhea, dizziness, photosensitivity, and yeast infections.  Rarely, more serious reactions can occur including but not limited to aplastic anemia, agranulocytosis, methemoglobinemia, blood dyscrasias, liver or kidney failure, lung infiltrates or desquamative/blistering drug rashes. Tetracycline Counseling: Patient counseled regarding possible photosensitivity and increased risk for sunburn.  Patient instructed to avoid sunlight, if possible.  When exposed to sunlight, patients should wear protective clothing, sunglasses, and sunscreen.  The patient was instructed to call the office immediately if the following severe adverse effects occur:  hearing changes, easy bruising/bleeding, severe headache, or vision changes.  The patient verbalized understanding of the proper use and possible adverse effects of tetracycline.  All of the patient's questions and concerns were addressed. Patient understands to avoid pregnancy while on therapy due to potential birth defects. Birth Control Pills Counseling: Birth Control Pill Counseling: I discussed with the patient the potential side effects of OCPs including but not limited to increased risk of stroke, heart attack, thrombophlebitis, deep venous thrombosis, hepatic adenomas, breast changes, GI upset, headaches, and depression.  The patient verbalized understanding of the proper use and possible adverse effects of OCPs. All of the patient's questions and concerns were addressed. Dapsone Counseling: I discussed with the patient the risks of dapsone including but not limited to hemolytic anemia, agranulocytosis, rashes, methemoglobinemia, kidney failure, peripheral neuropathy, headaches, GI upset, and liver toxicity.  Patients who start dapsone require monitoring including baseline LFTs and weekly CBCs for the first month, then every month thereafter.  The patient verbalized understanding of the proper use and possible adverse effects of dapsone.  All of the patient's questions and concerns were addressed. Benzoyl Peroxide Counseling: Patient counseled that medicine may cause skin irritation and bleach clothing.  In the event of skin irritation, the patient was advised to reduce the amount of the drug applied or use it less frequently.   The patient verbalized understanding of the proper use and possible adverse effects of benzoyl peroxide.  All of the patient's questions and concerns were addressed. Topical Retinoid Pregnancy And Lactation Text: This medication is Pregnancy Category C. It is unknown if this medication is excreted in breast milk. Spironolactone Counseling: Patient advised regarding risks of diarrhea, abdominal pain, hyperkalemia, birth defects (for female patients), liver toxicity and renal toxicity. The patient may need blood work to monitor liver and kidney function and potassium levels while on therapy. The patient verbalized understanding of the proper use and possible adverse effects of spironolactone.  All of the patient's questions and concerns were addressed. Tazorac Counseling:  Patient advised that medication is irritating and drying.  Patient may need to apply sparingly and wash off after an hour before eventually leaving it on overnight.  The patient verbalized understanding of the proper use and possible adverse effects of tazorac.  All of the patient's questions and concerns were addressed. Topical Sulfur Applications Pregnancy And Lactation Text: This medication is Pregnancy Category C and has an unknown safety profile during pregnancy. It is unknown if this topical medication is excreted in breast milk. Isotretinoin Counseling: Patient should get monthly blood tests, not donate blood, not drive at night if vision affected, not share medication, and not undergo elective surgery for 6 months after tx completed. Side effects reviewed, pt to contact office should one occur. Doxycycline Pregnancy And Lactation Text: This medication is Pregnancy Category D and not consider safe during pregnancy. It is also excreted in breast milk but is considered safe for shorter treatment courses.

## 2023-03-07 NOTE — CONSULT NOTE ADULT - SUBJECTIVE AND OBJECTIVE BOX
Patient is a 72y Female whom presented to the hospital with     PAST MEDICAL & SURGICAL HISTORY:  HTN (hypertension)      h/o Anxiety attack      Depression      h/o Myocardial infarct 2007      h/o Hepatitis A 1969  currently resolved, no symptoms      Murmur, cardiac      h/o Smoking  quitted 3/2012      Anemia secondary to renal failure      ESRD on dialysis      Falls      Ataxia      Type 2 diabetes mellitus      Peripheral vascular disease, unspecified      CAD (coronary artery disease)      Diabetes      coronary stent 2007      s/p Ovarian cyst removal      s/p surgical removal of benign Skin lesion epigastric area      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 1000 milliLiter(s) (50 mL/Hr) IV Continuous <Continuous>  dextrose 5%. 1000 milliLiter(s) (100 mL/Hr) IV Continuous <Continuous>  dextrose 50% Injectable 25 Gram(s) IV Push once  dextrose 50% Injectable 12.5 Gram(s) IV Push once  dextrose 50% Injectable 25 Gram(s) IV Push once  glucagon  Injectable 1 milliGRAM(s) IntraMuscular once  imipramine 50 milliGRAM(s) Oral daily  insulin glargine Injectable (LANTUS) 5 Unit(s) SubCutaneous at bedtime  insulin lispro (ADMELOG) corrective regimen sliding scale   SubCutaneous three times a day before meals  insulin lispro (ADMELOG) corrective regimen sliding scale   SubCutaneous at bedtime  metoprolol tartrate 100 milliGRAM(s) Oral at bedtime  multivitamin 1 Tablet(s) Oral daily  pantoprazole    Tablet 40 milliGRAM(s) Oral before breakfast  senna 2 Tablet(s) Oral at bedtime      Allergies    latex (Hives)  No Known Drug Allergies    Intolerances        SOCIAL HISTORY:  Denies ETOh,Smoking,     FAMILY HISTORY:  FH: myocardial infarction (Father)    FH: myocardial infarction (Father)    Family history of type 2 diabetes mellitus in brother (Sibling)        REVIEW OF SYSTEMS:    CONSTITUTIONAL: No weakness, fevers or chills  EYES/ENT: No visual changes;  no throat pain   NECK: No pain or stiffness  RESPIRATORY: No cough, wheezing, hemoptysis; No shortness of breath  CARDIOVASCULAR: No chest pain or palpitations  GASTROINTESTINAL: No abdominal or epigastric pain. No nausea, vomiting,     No diarrhea or constipation. No melena   GENITOURINARY: No dysuria, frequency or hematuria  NEUROLOGICAL: No numbness or weakness  SKIN: dry      VITAL:  T(C): , Max: 36.7 (03-07-23 @ 12:53)  T(F): , Max: 98 (03-07-23 @ 12:53)  HR: 76 (03-07-23 @ 19:33)  BP: 169/72 (03-07-23 @ 19:33)  BP(mean): --  RR: 16 (03-07-23 @ 19:33)  SpO2: 99% (03-07-23 @ 19:33)  Wt(kg): --    I and O's:    Height (cm): 175.3 (03-07 @ 12:53)  Weight (kg): 50.3 (03-07 @ 12:53)  BMI (kg/m2): 16.4 (03-07 @ 12:53)  BSA (m2): 1.61 (03-07 @ 12:53)    PHYSICAL EXAM:    Constitutional: NAD  HEENT: conjunctive   clear   Neck:  No JVD  Respiratory: CTAB  Cardiovascular: S1 and S2  Gastrointestinal: BS+, soft, NT/ND  Extremities: No peripheral edema  Neurological: A/O x 3, no focal deficits  Psychiatric: Normal mood, normal affect  : No Renteria  Skin: No rashes  Access: Not applicable    LABS:                        10.9   8.61  )-----------( 166      ( 07 Mar 2023 13:50 )             33.5     03-07    132<L>  |  98  |  92<H>  ----------------------------<  209<H>  4.9   |  27  |  8.50<H>    Ca    8.7      07 Mar 2023 13:50    TPro  6.8  /  Alb  3.2<L>  /  TBili  0.4  /  DBili  x   /  AST  17  /  ALT  9<L>  /  AlkPhos  112  03-07      Urine Studies:          RADIOLOGY & ADDITIONAL STUDIES:                   Patient is a 72y Female whom presented to the hospital with esrd on hd     PAST MEDICAL & SURGICAL HISTORY:  HTN (hypertension)      h/o Anxiety attack      Depression      h/o Myocardial infarct 2007      h/o Hepatitis A 1969  currently resolved, no symptoms      Murmur, cardiac      h/o Smoking  quitted 3/2012      Anemia secondary to renal failure      ESRD on dialysis      Falls      Ataxia      Type 2 diabetes mellitus      Peripheral vascular disease, unspecified      CAD (coronary artery disease)      Diabetes      coronary stent 2007      s/p Ovarian cyst removal      s/p surgical removal of benign Skin lesion epigastric area      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 1000 milliLiter(s) (50 mL/Hr) IV Continuous <Continuous>  dextrose 5%. 1000 milliLiter(s) (100 mL/Hr) IV Continuous <Continuous>  dextrose 50% Injectable 25 Gram(s) IV Push once  dextrose 50% Injectable 12.5 Gram(s) IV Push once  dextrose 50% Injectable 25 Gram(s) IV Push once  glucagon  Injectable 1 milliGRAM(s) IntraMuscular once  imipramine 50 milliGRAM(s) Oral daily  insulin glargine Injectable (LANTUS) 5 Unit(s) SubCutaneous at bedtime  insulin lispro (ADMELOG) corrective regimen sliding scale   SubCutaneous three times a day before meals  insulin lispro (ADMELOG) corrective regimen sliding scale   SubCutaneous at bedtime  metoprolol tartrate 100 milliGRAM(s) Oral at bedtime  multivitamin 1 Tablet(s) Oral daily  pantoprazole    Tablet 40 milliGRAM(s) Oral before breakfast  senna 2 Tablet(s) Oral at bedtime      Allergies    latex (Hives)  No Known Drug Allergies    Intolerances        SOCIAL HISTORY:  Denies ETOh,Smoking,     FAMILY HISTORY:  FH: myocardial infarction (Father)    FH: myocardial infarction (Father)    Family history of type 2 diabetes mellitus in brother (Sibling)        REVIEW OF SYSTEMS:    CONSTITUTIONAL: No weakness, fevers or chills  RESPIRATORY: No cough, wheezing, hemoptysis; No shortness of breath  CARDIOVASCULAR: No chest pain or palpitations  GASTROINTESTINAL: No abdominal or epigastric pain. No nausea, vomiting,     No diarrhea or constipation. No melena   GENITOURINARY: No dysuria, frequency or hematuria  NEUROLOGICAL: No numbness or weakness  SKIN: dry      VITAL:  T(C): , Max: 36.7 (03-07-23 @ 12:53)  T(F): , Max: 98 (03-07-23 @ 12:53)  HR: 76 (03-07-23 @ 19:33)  BP: 169/72 (03-07-23 @ 19:33)  BP(mean): --  RR: 16 (03-07-23 @ 19:33)  SpO2: 99% (03-07-23 @ 19:33)  Wt(kg): --    I and O's:    Height (cm): 175.3 (03-07 @ 12:53)  Weight (kg): 50.3 (03-07 @ 12:53)  BMI (kg/m2): 16.4 (03-07 @ 12:53)  BSA (m2): 1.61 (03-07 @ 12:53)    PHYSICAL EXAM:    Constitutional: NAD  HEENT: conjunctive   clear   Neck:  No JVD  Respiratory: CTAB  Cardiovascular: S1 and S2  Gastrointestinal: BS+, soft, NT/ND  Extremities: No peripheral edema  Neurological: A/O x 3, no focal deficits  Psychiatric: Normal mood, normal affect  : No Renteria  Skin: No rashes  Access: pos right arm  fistula   partial ray amputation of right great toe    LABS:                        10.9   8.61  )-----------( 166      ( 07 Mar 2023 13:50 )             33.5     03-07    132<L>  |  98  |  92<H>  ----------------------------<  209<H>  4.9   |  27  |  8.50<H>    Ca    8.7      07 Mar 2023 13:50    TPro  6.8  /  Alb  3.2<L>  /  TBili  0.4  /  DBili  x   /  AST  17  /  ALT  9<L>  /  AlkPhos  112  03-07      Urine Studies:          RADIOLOGY & ADDITIONAL STUDIES:

## 2023-03-07 NOTE — ED ADULT NURSE REASSESSMENT NOTE - NS ED NURSE REASSESS COMMENT FT1
Pt resting comfortably in bed at this time, offers no complaints.  Denies any chest pain or SOB.  No n/v/d.  Pt awaiting medical bed.  Pt to have RLE angiogram tomorrow 3/8/23.  Maintain comfort and safety.

## 2023-03-07 NOTE — ED PROVIDER NOTE - OBJECTIVE STATEMENT
72-year-old female with history of A-fib, diabetes, hypertension, hyperlipidemia, end-stage renal disease hemodialysis, CHF, CAD/CABG, PAD 72-year-old female with history of A-fib, diabetes, hypertension, hyperlipidemia, end-stage renal disease hemodialysis, CHF, CAD/CABG, PVD sent in from [facility] for admission for right lower extremity angiogram.  Patient otherwise feeling well and has no complaints. Last dialysis - yesterday.     pmd: Dr. Larson

## 2023-03-07 NOTE — ED ADULT NURSE NOTE - OBJECTIVE STATEMENT
Pt states she was sent in for a scheduled angiogram of her right leg. Pt denies pain to right leg, SOB, CB, fever and chills. Pt in no acute distress. Pt updated on plan of care.

## 2023-03-07 NOTE — ED PROVIDER NOTE - PROGRESS NOTE DETAILS
Case discussed with vascular NP Adilene, will see patient tomorrow. Agrees with plan of admission to medicine service for medical and cardiac clearance

## 2023-03-07 NOTE — CONSULT NOTE ADULT - SUBJECTIVE AND OBJECTIVE BOX
SHILO KOHLER    PLV APER 03    Allergies    latex (Hives)  No Known Drug Allergies    Intolerances        PAST MEDICAL & SURGICAL HISTORY:  HTN (hypertension)      h/o Anxiety attack      Depression      h/o Myocardial infarct 2007      h/o Hepatitis A 1969  currently resolved, no symptoms      Murmur, cardiac      h/o Smoking  quitted 3/2012      Anemia secondary to renal failure      ESRD on dialysis      Falls      Ataxia      Type 2 diabetes mellitus      Peripheral vascular disease, unspecified      CAD (coronary artery disease)      Diabetes      coronary stent 2007      s/p Ovarian cyst removal      s/p surgical removal of benign Skin lesion epigastric area      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          FAMILY HISTORY:  FH: myocardial infarction (Father)    FH: myocardial infarction (Father)    Family history of type 2 diabetes mellitus in brother (Sibling)        Home Medications:  acetaminophen 325 mg oral tablet: 2 tab(s) orally every 6 hours, As needed, Temp greater or equal to 38C (100.4F), Mild Pain (1 - 3) (07 Mar 2023 13:43)  Admelog SoloStar 100 units/mL injectable solution:  (07 Mar 2023 13:43)  aspirin 81 mg oral delayed release tablet: 1 tab(s) orally once a day (at bedtime) (07 Mar 2023 13:43)  atorvastatin 40 mg oral tablet: 1 tab(s) orally once a day (at bedtime) (07 Mar 2023 13:43)  cloNIDine 0.1 mg oral tablet: 1 tab(s) orally 2 times a day (07 Mar 2023 13:43)  imipramine 50 mg oral tablet: 1 tab(s) orally once a day (07 Mar 2023 13:43)  insulin glargine-yfgn 100 units/mL subcutaneous solution: 20 unit(s) subcutaneous once a day (at bedtime) (01 Mar 2023 11:00)  LPS critical care: 17 gram(s) orally 3 times a day (01 Mar 2023 11:00)  Melatonin 3 mg oral tablet: 1 tab(s) orally once a day (at bedtime), As Needed (01 Mar 2023 11:00)  metoprolol tartrate 100 mg oral tablet: 1 tab(s) orally once a day (at bedtime) (01 Mar 2023 11:00)  nephro carb steady: 0.08 gram-1.8 kcal/ml (01 Mar 2023 11:00)  Nephro-Alfie oral tablet: 1 tab(s) orally once a day (01 Mar 2023 11:00)  PANTOPRAZOLE 40MG DR TAB: tab(s) orally once a day (01 Mar 2023 11:00)  Senna 8.6 mg oral tablet: 1 tab(s) orally once a day (at bedtime) (01 Mar 2023 11:00)      MEDICATIONS  (STANDING):  aspirin enteric coated 81 milliGRAM(s) Oral daily  atorvastatin 40 milliGRAM(s) Oral at bedtime  cloNIDine 0.1 milliGRAM(s) Oral two times a day  dextrose 5%. 1000 milliLiter(s) (50 mL/Hr) IV Continuous <Continuous>  dextrose 5%. 1000 milliLiter(s) (100 mL/Hr) IV Continuous <Continuous>  dextrose 50% Injectable 25 Gram(s) IV Push once  dextrose 50% Injectable 12.5 Gram(s) IV Push once  dextrose 50% Injectable 25 Gram(s) IV Push once  glucagon  Injectable 1 milliGRAM(s) IntraMuscular once  imipramine 50 milliGRAM(s) Oral daily  insulin glargine Injectable (LANTUS) 5 Unit(s) SubCutaneous at bedtime  insulin lispro (ADMELOG) corrective regimen sliding scale   SubCutaneous three times a day before meals  insulin lispro (ADMELOG) corrective regimen sliding scale   SubCutaneous at bedtime  metoprolol tartrate 100 milliGRAM(s) Oral at bedtime  multivitamin 1 Tablet(s) Oral daily  pantoprazole    Tablet 40 milliGRAM(s) Oral before breakfast  senna 2 Tablet(s) Oral at bedtime    MEDICATIONS  (PRN):  acetaminophen     Tablet .. 650 milliGRAM(s) Oral every 6 hours PRN Temp greater or equal to 38C (100.4F), Mild Pain (1 - 3)  aluminum hydroxide/magnesium hydroxide/simethicone Suspension 30 milliLiter(s) Oral every 4 hours PRN Dyspepsia  dextrose Oral Gel 15 Gram(s) Oral once PRN Blood Glucose LESS THAN 70 milliGRAM(s)/deciliter  melatonin 3 milliGRAM(s) Oral at bedtime PRN Insomnia  ondansetron Injectable 4 milliGRAM(s) IV Push every 8 hours PRN Nausea and/or Vomiting  traMADol 50 milliGRAM(s) Oral four times a day PRN Moderate Pain (4 - 6)      Diet, NPO after Midnight:      NPO Start Date: 07-Mar-2023,   NPO Start Time: 23:59 (03-07-23 @ 16:10) [Active]  Diet, Consistent Carbohydrate Renal w/Evening Snack:   Soft and Bite Sized (SOFTBTSZ)  1500mL Fluid Restriction ([id number])  For patients receiving Renal Replacement - No Protein Restr, No Conc K, No Conc Phos, Low Sodium (RENAL)  Supplement Feeding Modality:  Oral  Nepro Cans or Servings Per Day:  1       Frequency:  Two Times a day (03-07-23 @ 16:04) [Active]          Vital Signs Last 24 Hrs  T(C): 36.7 (07 Mar 2023 12:53), Max: 36.7 (07 Mar 2023 12:53)  T(F): 98 (07 Mar 2023 12:53), Max: 98 (07 Mar 2023 12:53)  HR: 84 (07 Mar 2023 12:53) (84 - 84)  BP: 140/60 (07 Mar 2023 12:53) (140/60 - 140/60)  BP(mean): --  RR: 18 (07 Mar 2023 12:53) (18 - 18)  SpO2: 97% (07 Mar 2023 12:53) (97% - 97%)                  LABS:                        10.9   8.61  )-----------( 166      ( 07 Mar 2023 13:50 )             33.5     03-07    132<L>  |  98  |  92<H>  ----------------------------<  209<H>  4.9   |  27  |  8.50<H>    Ca    8.7      07 Mar 2023 13:50    TPro  6.8  /  Alb  3.2<L>  /  TBili  0.4  /  DBili  x   /  AST  17  /  ALT  9<L>  /  AlkPhos  112  03-07    PT/INR - ( 07 Mar 2023 15:00 )   PT: 11.1 sec;   INR: 0.95 ratio         PTT - ( 07 Mar 2023 15:00 )  PTT:26.6 sec          WBC:  WBC Count: 8.61 K/uL (03-07 @ 13:50)      MICROBIOLOGY:  RECENT CULTURES:              PT/INR - ( 07 Mar 2023 15:00 )   PT: 11.1 sec;   INR: 0.95 ratio         PTT - ( 07 Mar 2023 15:00 )  PTT:26.6 sec    Sodium:  Sodium, Serum: 132 mmol/L (03-07 @ 13:50)      8.50 mg/dL 03-07 @ 13:50      Hemoglobin:  Hemoglobin: 10.9 g/dL (03-07 @ 13:50)      Platelets: Platelet Count - Automated: 166 K/uL (03-07 @ 13:50)      LIVER FUNCTIONS - ( 07 Mar 2023 13:50 )  Alb: 3.2 g/dL / Pro: 6.8 g/dL / ALK PHOS: 112 U/L / ALT: 9 U/L / AST: 17 U/L / GGT: x                 RADIOLOGY & ADDITIONAL STUDIES:      MICROBIOLOGY:  RECENT CULTURES:

## 2023-03-07 NOTE — ED PROVIDER NOTE - ATTENDING APP SHARED VISIT CONTRIBUTION OF CARE
73yo female sent from [facility] for angiogram, pt has PVD and needs her RLE evaluated, pt has no complaints  exam:o edema, neuro intact  plan: labs, cta r/o stenosis, vascular and poss admit  agree with assessment and plan of PA

## 2023-03-07 NOTE — PATIENT PROFILE ADULT - FALL HARM RISK - HARM RISK INTERVENTIONS
Assistance with ambulation/Assistance OOB with selected safe patient handling equipment/Communicate Risk of Fall with Harm to all staff/Discuss with provider need for PT consult/Monitor gait and stability/Provide patient with walking aids - walker, cane, crutches/Reinforce activity limits and safety measures with patient and family/Review medications for side effects contributing to fall risk/Sit up slowly, dangle for a short time, stand at bedside before walking/Tailored Fall Risk Interventions/Use of alarms - bed, chair and/or voice tab/Visual Cue: Yellow wristband and red socks/Bed in lowest position, wheels locked, appropriate side rails in place/Call bell, personal items and telephone in reach/Instruct patient to call for assistance before getting out of bed or chair/Non-slip footwear when patient is out of bed/[place] to call system/Physically safe environment - no spills, clutter or unnecessary equipment/Purposeful Proactive Rounding/Room/bathroom lighting operational, light cord in reach

## 2023-03-07 NOTE — PATIENT PROFILE ADULT - FUNCTIONAL ASSESSMENT - BASIC MOBILITY 6.
Not able to assess (calculate score using AMPAC averaging method) 2-calculated by average/Not able to assess (calculate score using [facility] averaging method)

## 2023-03-07 NOTE — H&P ADULT - PROBLEM SELECTOR PLAN 1
Seen  by vascular teem 02/24 during previous admission PAD S/P R-->L bifem-fem BK pop bypass, recent fempop bypass (6/21/2022), and partial ray amputation right great toe (6/22/2022), Further imaging /workup as per vacular team ,booked for angiogram 03/08/23

## 2023-03-07 NOTE — ED ADULT TRIAGE NOTE - MODE OF ARRIVAL OTHER
care giver Inflammation Suggestive Of Cancer Camouflage Histology Text: There was a dense lymphocytic infiltrate which prevented adequate histologic evaluation of adjacent structures.

## 2023-03-07 NOTE — H&P ADULT - HISTORY OF PRESENT ILLNESS
72-year-old female with history of A-fib, diabetes, hypertension, hyperlipidemia, end-stage renal disease hemodialysis, CHF, CAD/CABG, PVD sent in from [facility] for admission for right lower extremity angiogram ,patient is scheduled by vascular team for intervention tomorrow and requires cardiology clearance ,labs ,EKG and chest xray .  Patient otherwise feeling well and has no complaints. Last dialysis - yesterday. Patient was recently hospitaliazed with malfunctioning HD fistula , nephrologist contacted .

## 2023-03-07 NOTE — H&P ADULT - NSHPLABSRESULTS_GEN_ALL_CORE
< from: Xray Chest 1 View- PORTABLE-Urgent (03.07.23 @ 13:59) >    INTERPRETATION:  AP semierect chest on March 7, 2023 1:36 PM. Patient is   scheduled for admission.    Heart magnified by technique. Left coronary stent again noted.    Lungs remain clear.    Chest is similar to February 22 of this year.    IMPRESSION: No acute finding or change.    < end of copied text >    < from: TTE Echo Complete w/o Contrast w/ Doppler (04.08.22 @ 10:58) >      Height: 175.3 cm  Weight: 60.4 KG  BSA: 1.74 sq m  Blood Pressure: 162/68   MEASUREMENTS  IVS: 1.1cm  PWT: 1.1cm  LA: 3.1cm  AO: 2.6cm  AV Cusp Separation: cm  LVIDd: 4.9cm  LVIDs: 3.2cm  LVOT: 1.7cm    LVEF: 45%  RVSP: mmHg    FINDINGS  Left Ventricle: Left ventricle was of normal size, moderate LV systolic   dysfunction EF 45%  Aortic Valve: Aortic sclerosis  Mitral Valve: Moderate mitral regurgitation  Tricuspid Valve: Trace tricuspid regurgitation  Pulmonic Valve: Normal  Left Atrium: Normal  Right Ventricle: Normal  Right Atrium: Normal  Diastolic Function: Normal  Pericardium/Pleura: Normal      IMPRESSION:  Left ventricle was of normal size, moderate LV systolic dysfunction EF 45%  Aortic sclerosis  Moderate mitral regurgitation  Trace tricuspid regurgitation    < end of copied text >

## 2023-03-07 NOTE — ED PROVIDER NOTE - CONSTITUTIONAL, MLM
[facility] History and Physical  PCP:  Khushbu Navarrete MD    Chief Complaint:  Abdominal pain      History Of Present Illness  Aliyah Rosas is a 81 year old female, patient of Khushbu Navarrete MD, with past medical history of GERD presented to the ED for abdominal pain started yesterday associated with nausea but no vomiting.  Patient reported that he started experiencing abdominal pain p.m. yesterday after eating sausage sandwich.  Patient bedside.  Abdominal pain is constant and epigastric.  As well as the right and left upper quadrant radiating to the back.  Pain is 10 out of 10.  Patient denies any chest pain, shortness of breath, cough fever, vomiting, urinary symptoms diarrhea or constipation.      Upon arrival the patient's vitals were  weight is 81.6 kg (180 lb). Her oral temperature is 99 °F (37.2 °C). Her blood pressure is 132/68 and her pulse is 85. Her respiration is 16 and oxygen saturation is 94%. Labs noted sodium 131, glucose 142, and WBC 13.3. CT A/P showed cholelithiasis within a hydropic gallbladder, with possible mild wall thickening/edema and minimal pericholecystic fluid, concerning for acute cholecystitis. Mild common bile duct and moderate intrahepatic biliary ductal dilation, could be from a distal obstructing calculus or lesion. Hiatal hernia, containing a small amount of proximal stomach and a moderate amount of mesenteric fat. Moderate bibasilar atelectasis with mild superimposed groundglass opacities that were also likely atelectasis. Aspiration or infection were considered in proper clinical context. Diverticulosis and hepatic steatosis were also noted. US Gallbladder showed cholelithiasis and mild gallbladder distention. An EKG showed sinus rhythm, borderline prolonged LA interval, left axis deviation, low voltage, precordial leads, and prolonged QT interval. While in the ED, the patient was provided IV Zosyn morphine, and NS bolus. General surgery was consulted.  She was admitted for further management and evaluation.       Review of Systems  Review of Systems   All other systems reviewed and are negative.  As in HPI    Past Medical History  Past Medical History:   Diagnosis Date   • GERD (gastroesophageal reflux disease)         Surgical History  No past surgical history on file.     Social History  Social History     Tobacco Use   • Smoking status: Never Smoker   • Smokeless tobacco: Never Used   Substance Use Topics   • Alcohol use: Never     Frequency: Never   • Drug use: Never       Family History  No family history on file.     Allergies  ALLERGIES:  No Known Allergies    Medications  (Not in a hospital admission)         Last Recorded Vitals  Visit Vitals  /68 (BP Location: LUE - Left upper extremity, Patient Position: Sitting)   Pulse 85   Temp 99 °F (37.2 °C) (Oral)   Resp 16   Wt 81.6 kg (180 lb)   SpO2 94%   Breastfeeding No       Physical Exam  Constitutional:       General: She is not in acute distress.     Appearance: Normal appearance. She is obese. She is not ill-appearing.   HENT:      Head: Normocephalic and atraumatic.      Nose: Nose normal.      Mouth/Throat:      Mouth: Mucous membranes are moist.   Eyes:      Extraocular Movements: Extraocular movements intact.   Cardiovascular:      Rate and Rhythm: Normal rate and regular rhythm.      Heart sounds: Normal heart sounds. No murmur.   Pulmonary:      Effort: Pulmonary effort is normal. No respiratory distress.      Breath sounds: Normal breath sounds. No wheezing.   Abdominal:      General: Abdomen is flat. Bowel sounds are normal.      Palpations: Abdomen is soft.      Comments: Tenderness noted across the upper part of the abdomen was on the epigastric and right upper quadrant.   Musculoskeletal:         General: No swelling.      Right lower leg: No edema.      Left lower leg: No edema.   Neurological:      General: No focal deficit present.      Mental Status: She is alert and oriented to  person, place, and time.   Psychiatric:         Mood and Affect: Mood normal.         Behavior: Behavior normal.          Imaging  US GALLBLADDER   Final Result       Cholelithiasis and mild gallbladder distention.         Electronically Signed by: RUBY WHITEHEAD MD    Signed on: 7/10/2020 12:46 PM          CT ABDOMEN PELVIS W CONTRAST - IV contrast only   Final Result   1.   Cholelithiasis within a hydropic gallbladder, with possible mild wall thickening/edema and minimal pericholecystic fluid.  This is concerning for acute cholecystitis, and can be initially further evaluated with right upper quadrant abdominal    ultrasound.   2.   Mild common bile duct and moderate intrahepatic biliary ductal dilation.  This could be from a distal obstructing calculus or lesion.   3.   Hiatal hernia, containing a small amount of the proximal stomach and a moderate amount of mesenteric fat.   4.   Moderate bibasilar atelectasis, with mild superimposed groundglass opacities that are also likely atelectasis.  Aspiration or infection are considered in the proper clinical context.   5.   Diverticulosis.   6.   Hepatic steatosis          Electronically Signed by: PREETHI MATHEW M.D.    Signed on: 7/10/2020 11:55 AM              Labs   Admission on 07/10/2020   Component Date Value Ref Range Status   • Ventricular Rate EKG/Min (BPM) 07/10/2020 85   Preliminary   • Atrial Rate (BPM) 07/10/2020 85   Preliminary   • UT-Interval (MSEC) 07/10/2020 217   Preliminary   • QRS-Interval (MSEC) 07/10/2020 97   Preliminary   • QT-Interval (MSEC) 07/10/2020 439   Preliminary   • QTc 07/10/2020 522   Preliminary   • P Axis (Degrees) 07/10/2020 63   Preliminary   • R Axis (Degrees) 07/10/2020 -31   Preliminary   • T Axis (Degrees) 07/10/2020 16   Preliminary   • REPORT TEXT 07/10/2020    Preliminary                    Value:Sinus rhythm  Borderline prolonged UT interval  Left axis deviation  Low voltage, precordial leads  Prolonged QT interval      • WBC 07/10/2020 13.3* 4.2 - 11.0 K/mcL Final   • RBC 07/10/2020 4.53  4.00 - 5.20 mil/mcL Final   • HGB 07/10/2020 13.5  12.0 - 15.5 g/dL Final   • HCT 07/10/2020 39.6  36.0 - 46.5 % Final   • MCV 07/10/2020 87.4  78.0 - 100.0 fl Final   • MCH 07/10/2020 29.8  26.0 - 34.0 pg Final   • MCHC 07/10/2020 34.1  32.0 - 36.5 g/dL Final   • RDW-CV 07/10/2020 13.1  11.0 - 15.0 % Final   • PLT 07/10/2020 346  140 - 450 K/mcL Final   • NRBC 07/10/2020 0  0 /100 WBC Final   • DIFF TYPE 07/10/2020 AUTOMATED DIFFERENTIAL   Final   • Neutrophil 07/10/2020 86  % Final   • LYMPH 07/10/2020 8  % Final   • MONO 07/10/2020 5  % Final   • EOSIN 07/10/2020 0  % Final   • BASO 07/10/2020 0  % Final   • Percent Immature Granuloctyes 07/10/2020 1  % Final   • Absolute Neutrophil 07/10/2020 11.6* 1.8 - 7.7 K/mcL Final   • Absolute Lymph 07/10/2020 1.0  1.0 - 4.0 K/mcL Final   • Absolute Mono 07/10/2020 0.6  0.3 - 0.9 K/mcL Final   • Absolute Eos 07/10/2020 0.0* 0.1 - 0.5 K/mcL Final   • Absolute Baso 07/10/2020 0.1  0.0 - 0.3 K/mcL Final   • Absolute Immature Granulocytes 07/10/2020 0.1  0 - 0.2 K/mcl Final   • Sodium 07/10/2020 131* 135 - 145 mmol/L Final   • Potassium 07/10/2020 3.6  3.4 - 5.1 mmol/L Final   • Chloride 07/10/2020 101  98 - 107 mmol/L Final   • Carbon Dioxide 07/10/2020 25  21 - 32 mmol/L Final   • Anion Gap 07/10/2020 9* 10 - 20 mmol/L Final   • Glucose 07/10/2020 142* 65 - 99 mg/dL Final   • BUN 07/10/2020 28* 6 - 20 mg/dL Final   • Creatinine 07/10/2020 0.91  0.51 - 0.95 mg/dL Final   • GFR Estimate,  07/10/2020 68   Final    eGFR 60 - 89 mL/min/1.73m2 = Mild decrease in kidney function.   • GFR Estimate, Non  07/10/2020 58   Final    eGFR 30-59 mL/min/1.73m2 = Moderate decrease in kidney function. Stage 3 CKD (chronic kidney disease) or moderate kidney disease.   • BUN/Creatinine Ratio 07/10/2020 31* 7 - 25 Final   • CALCIUM 07/10/2020 9.0  8.4 - 10.2 mg/dL Final   • TOTAL BILIRUBIN  07/10/2020 0.5  0.2 - 1.0 mg/dL Final   • AST/SGOT 07/10/2020 17  <38 Units/L Final   • ALT/SGPT 07/10/2020 21  <64 Units/L Final   • ALK PHOSPHATASE 07/10/2020 64  45 - 117 Units/L Final   • TOTAL PROTEIN 07/10/2020 8.4* 6.4 - 8.2 g/dL Final   • Albumin 07/10/2020 3.8  3.6 - 5.1 g/dL Final   • GLOBULIN 07/10/2020 4.6* 2.0 - 4.0 g/dL Final   • A/G Ratio, Serum 07/10/2020 0.8* 1.0 - 2.4 Final   • Lipase 07/10/2020 90  73 - 393 Units/L Final   • TROPONIN I 07/10/2020 <0.02  <0.05 ng/mL Final         Assessment and Plan:  Acute cholelithiasis with concern for acute cholecystitis   CT A/P (7/10) showed showed cholelithiasis within a hydropic gallbladder, with possible mild wall thickening/edema and minimal pericholecystic fluid, concerning for acute cholecystitis. Mild common bile duct and moderate intrahepatic biliary ductal dilation, could be from a distal obstructing calculus or lesion. Moderate bibasilar atelectasis with mild superimposed groundglass opacities that were also likely atelectasis. Aspiration or infection were considered in proper clinical context. Diverticulosis and hepatic steatosis were also noted.   US Gallbladder (7/10) showed cholelithiasis and mild gallbladder distention  S/p morphine, 0.5L NS bolus in ED   - COVID-19 pending            -We will continue pain control, keep patient n.p.o., IV fluids.            -Patient will need MRCP to rule out common bile duct stone.  Will leave it up to general surgery            -Continue Zosyn   - GenSx to consult, appreciate reccs    SIRS criteria with concern for sepsis likely secondary to acute cholecystitis  S/p IV zosyn   - As evidenced by tachypnea (labile) and leukocytosis (WBC 13.3 on 7/10)   - COVID-19 pending   -Continue management as an above    Hyponatremia: Continue IV fluid    Prolonged QTC: We will avoid QTC prolonging medication.  Tigan for nausea vomiting.    As symptomatic hiatal hernia  CT A/P noted hiatal hernia, containing a small amount  of proximal stomach and a moderate amount of mesenteric fat  Will need to follow-up with surgery as an outpatient.    Leukocytosis - WBC 13.3 (7/10): Management as an above    GERD -IV famotidine ,nopt not on medication  ?Primary hypertension - SBP 130s-170s on arrival, patient takes lisinopril and metoprolol at home, will resume p.o. medications once is okay with general surgery    Code Status    Code Status: Not on file    DVT Prophylaxis  None.  Lovenox, hold is going for surgery and resume once okay with general surgery    Disposition:  Pending clinical improvement and GenSx input    INPATIENT CERTIFICATION OF MEDICAL NECESSITY  I certify that hospital services are reasonable and necessary for this patient and will be  appropriately provided as inpatient services in accordance with the CMS 2-midnight benchmark  under 42 .3(e).  This patient requires inpatient hospital services for medical treatment or medically required  inpatient diagnostic study. The reason this patient requires hospital services is because of  above condition.    This patient’s length of stay is estimated to be >2 midnights.  The plans for this patient’s post-hospital care are expected to be:  __ Home - no services  __ Home Health Care  __ Skilled Nursing Facility  X To be determined  __ Other ______________      Primary Care Physician  Khushbu Navarrete MD        All patient questions answered  All labs and imaging reviewed    Charting performed by maris Gutierrez for Dr. Stoney Doyle.      All medical record entries made by the carlosibmelly were at my direction. I have reviewed the chart  and agree that the record accurately reflects my personal performance of the history, physical  exam, hospital course, and assessment and plan.   normal... Well appearing elderly female, awake, alert, oriented to person, place, time/situation and in no apparent distress.

## 2023-03-08 DIAGNOSIS — E87.5 HYPERKALEMIA: ICD-10-CM

## 2023-03-08 LAB
ALBUMIN SERPL ELPH-MCNC: 2.7 G/DL — LOW (ref 3.3–5)
ALP SERPL-CCNC: 91 U/L — SIGNIFICANT CHANGE UP (ref 40–120)
ALT FLD-CCNC: <6 U/L — LOW (ref 12–78)
ANION GAP SERPL CALC-SCNC: 10 MMOL/L — SIGNIFICANT CHANGE UP (ref 5–17)
AST SERPL-CCNC: 7 U/L — LOW (ref 15–37)
BASOPHILS # BLD AUTO: 0.05 K/UL — SIGNIFICANT CHANGE UP (ref 0–0.2)
BASOPHILS NFR BLD AUTO: 0.7 % — SIGNIFICANT CHANGE UP (ref 0–2)
BILIRUB SERPL-MCNC: 0.3 MG/DL — SIGNIFICANT CHANGE UP (ref 0.2–1.2)
BUN SERPL-MCNC: 111 MG/DL — HIGH (ref 7–23)
CALCIUM SERPL-MCNC: 8.6 MG/DL — SIGNIFICANT CHANGE UP (ref 8.5–10.1)
CHLORIDE SERPL-SCNC: 99 MMOL/L — SIGNIFICANT CHANGE UP (ref 96–108)
CO2 SERPL-SCNC: 26 MMOL/L — SIGNIFICANT CHANGE UP (ref 22–31)
CREAT SERPL-MCNC: 9.8 MG/DL — HIGH (ref 0.5–1.3)
EGFR: 4 ML/MIN/1.73M2 — LOW
EOSINOPHIL # BLD AUTO: 0.48 K/UL — SIGNIFICANT CHANGE UP (ref 0–0.5)
EOSINOPHIL NFR BLD AUTO: 7.1 % — HIGH (ref 0–6)
GLUCOSE SERPL-MCNC: 209 MG/DL — HIGH (ref 70–99)
HCT VFR BLD CALC: 29.2 % — LOW (ref 34.5–45)
HGB BLD-MCNC: 9.6 G/DL — LOW (ref 11.5–15.5)
IMM GRANULOCYTES NFR BLD AUTO: 1.3 % — HIGH (ref 0–0.9)
INR BLD: 0.99 RATIO — SIGNIFICANT CHANGE UP (ref 0.88–1.16)
LYMPHOCYTES # BLD AUTO: 1 K/UL — SIGNIFICANT CHANGE UP (ref 1–3.3)
LYMPHOCYTES # BLD AUTO: 14.9 % — SIGNIFICANT CHANGE UP (ref 13–44)
MCHC RBC-ENTMCNC: 30.9 PG — SIGNIFICANT CHANGE UP (ref 27–34)
MCHC RBC-ENTMCNC: 32.9 GM/DL — SIGNIFICANT CHANGE UP (ref 32–36)
MCV RBC AUTO: 93.9 FL — SIGNIFICANT CHANGE UP (ref 80–100)
MONOCYTES # BLD AUTO: 0.85 K/UL — SIGNIFICANT CHANGE UP (ref 0–0.9)
MONOCYTES NFR BLD AUTO: 12.6 % — SIGNIFICANT CHANGE UP (ref 2–14)
NEUTROPHILS # BLD AUTO: 4.26 K/UL — SIGNIFICANT CHANGE UP (ref 1.8–7.4)
NEUTROPHILS NFR BLD AUTO: 63.4 % — SIGNIFICANT CHANGE UP (ref 43–77)
NRBC # BLD: 0 /100 WBCS — SIGNIFICANT CHANGE UP (ref 0–0)
PLATELET # BLD AUTO: 159 K/UL — SIGNIFICANT CHANGE UP (ref 150–400)
POTASSIUM SERPL-MCNC: 5 MMOL/L — SIGNIFICANT CHANGE UP (ref 3.5–5.3)
POTASSIUM SERPL-SCNC: 5 MMOL/L — SIGNIFICANT CHANGE UP (ref 3.5–5.3)
PROT SERPL-MCNC: 5.7 G/DL — LOW (ref 6–8.3)
PROTHROM AB SERPL-ACNC: 11.6 SEC — SIGNIFICANT CHANGE UP (ref 10.5–13.4)
RBC # BLD: 3.11 M/UL — LOW (ref 3.8–5.2)
RBC # FLD: 12.8 % — SIGNIFICANT CHANGE UP (ref 10.3–14.5)
SODIUM SERPL-SCNC: 135 MMOL/L — SIGNIFICANT CHANGE UP (ref 135–145)
WBC # BLD: 6.73 K/UL — SIGNIFICANT CHANGE UP (ref 3.8–10.5)
WBC # FLD AUTO: 6.73 K/UL — SIGNIFICANT CHANGE UP (ref 3.8–10.5)

## 2023-03-08 PROCEDURE — 99223 1ST HOSP IP/OBS HIGH 75: CPT

## 2023-03-08 PROCEDURE — 99221 1ST HOSP IP/OBS SF/LOW 40: CPT

## 2023-03-08 RX ORDER — SODIUM CHLORIDE 9 MG/ML
1000 INJECTION, SOLUTION INTRAVENOUS
Refills: 0 | Status: DISCONTINUED | OUTPATIENT
Start: 2023-03-08 | End: 2023-03-11

## 2023-03-08 RX ORDER — RISPERIDONE 4 MG/1
0.5 TABLET ORAL AT BEDTIME
Refills: 0 | Status: DISCONTINUED | OUTPATIENT
Start: 2023-03-08 | End: 2023-03-11

## 2023-03-08 RX ORDER — INSULIN LISPRO 100/ML
VIAL (ML) SUBCUTANEOUS EVERY 6 HOURS
Refills: 0 | Status: DISCONTINUED | OUTPATIENT
Start: 2023-03-08 | End: 2023-03-09

## 2023-03-08 RX ORDER — DEXTROSE 50 % IN WATER 50 %
12.5 SYRINGE (ML) INTRAVENOUS ONCE
Refills: 0 | Status: DISCONTINUED | OUTPATIENT
Start: 2023-03-08 | End: 2023-03-11

## 2023-03-08 RX ORDER — HEPARIN SODIUM 5000 [USP'U]/ML
5000 INJECTION INTRAVENOUS; SUBCUTANEOUS EVERY 12 HOURS
Refills: 0 | Status: DISCONTINUED | OUTPATIENT
Start: 2023-03-07 | End: 2023-03-11

## 2023-03-08 RX ORDER — DEXTROSE 50 % IN WATER 50 %
25 SYRINGE (ML) INTRAVENOUS ONCE
Refills: 0 | Status: DISCONTINUED | OUTPATIENT
Start: 2023-03-08 | End: 2023-03-11

## 2023-03-08 RX ORDER — DEXTROSE 50 % IN WATER 50 %
15 SYRINGE (ML) INTRAVENOUS ONCE
Refills: 0 | Status: DISCONTINUED | OUTPATIENT
Start: 2023-03-08 | End: 2023-03-11

## 2023-03-08 RX ORDER — PANTOPRAZOLE SODIUM 20 MG/1
0 TABLET, DELAYED RELEASE ORAL
Qty: 0 | Refills: 0 | DISCHARGE

## 2023-03-08 RX ORDER — GLUCAGON INJECTION, SOLUTION 0.5 MG/.1ML
1 INJECTION, SOLUTION SUBCUTANEOUS ONCE
Refills: 0 | Status: DISCONTINUED | OUTPATIENT
Start: 2023-03-08 | End: 2023-03-11

## 2023-03-08 RX ORDER — SODIUM ZIRCONIUM CYCLOSILICATE 10 G/10G
10 POWDER, FOR SUSPENSION ORAL
Refills: 0 | Status: DISCONTINUED | OUTPATIENT
Start: 2023-03-08 | End: 2023-03-11

## 2023-03-08 RX ADMIN — Medication 50 MILLIGRAM(S): at 18:51

## 2023-03-08 RX ADMIN — Medication 4: at 18:55

## 2023-03-08 RX ADMIN — SENNA PLUS 2 TABLET(S): 8.6 TABLET ORAL at 21:58

## 2023-03-08 RX ADMIN — Medication 3 MILLIGRAM(S): at 21:58

## 2023-03-08 RX ADMIN — ERYTHROPOIETIN 10000 UNIT(S): 10000 INJECTION, SOLUTION INTRAVENOUS; SUBCUTANEOUS at 12:13

## 2023-03-08 RX ADMIN — Medication 100 MILLIGRAM(S): at 21:58

## 2023-03-08 RX ADMIN — Medication 3: at 06:15

## 2023-03-08 RX ADMIN — SODIUM ZIRCONIUM CYCLOSILICATE 10 GRAM(S): 10 POWDER, FOR SUSPENSION ORAL at 18:54

## 2023-03-08 RX ADMIN — Medication 81 MILLIGRAM(S): at 18:51

## 2023-03-08 RX ADMIN — Medication 0.1 MILLIGRAM(S): at 18:55

## 2023-03-08 RX ADMIN — ATORVASTATIN CALCIUM 40 MILLIGRAM(S): 80 TABLET, FILM COATED ORAL at 21:58

## 2023-03-08 RX ADMIN — PANTOPRAZOLE SODIUM 40 MILLIGRAM(S): 20 TABLET, DELAYED RELEASE ORAL at 06:19

## 2023-03-08 RX ADMIN — INSULIN GLARGINE 5 UNIT(S): 100 INJECTION, SOLUTION SUBCUTANEOUS at 21:59

## 2023-03-08 RX ADMIN — RISPERIDONE 0.5 MILLIGRAM(S): 4 TABLET ORAL at 21:58

## 2023-03-08 RX ADMIN — Medication 1 TABLET(S): at 18:55

## 2023-03-08 RX ADMIN — Medication 0.1 MILLIGRAM(S): at 05:44

## 2023-03-08 NOTE — DIETITIAN INITIAL EVALUATION ADULT - OTHER INFO
Pt is a "72-year-old female with history of A-fib, diabetes, hypertension, hyperlipidemia, end-stage renal disease hemodialysis, CHF, CAD/CABG, PVD sent in from [facility] for admission for right lower extremity angiogram."    Visited pt at bedside this am. Pt resting during visit. Pt NPO this am for procedure/test. Diet order now active for soft/bite size, con cho, renal diet with 1500ml FR. Receiving Nepro BID. Pt denies chewing/swallowing difficulties, although reports missing lower dentures. No food allergies noted. Denies N/V/D/C. No BMs thus far. CBW on admission 110#. Pt reports weight loss over the past several months, previous wt of 135#. No edema noted. Skin ecchymotic. Pt with hx of DM, A1c of 6.8% obtained. Diet education not appropriate at this time. Recommend assistance/encouragement at meal times as needed.

## 2023-03-08 NOTE — CONSULT NOTE ADULT - ADDITIONAL PE
Right foot- No palpable or dopplerable DP, AT, or mid arch.  +Dopplerable PT.  Foot appears cool to touch. Amputation of "big" toe.

## 2023-03-08 NOTE — CONSULT NOTE ADULT - CARDIOVASCULAR
Patient is a 63-year-old white female status post right hip fractures, status post surgery for fall.  Transferred to swing bed yesterday.  Other than pain and hip has no other complaints or problems.    Hospital course:  The patient is in no acute distress and is undergoing physical therapy.  She can ambulate and get out of bed but with pain.  There has been no fever evidence infection drainage or redness around the wound area.      On physical exam:  She is alert orient no acute distress and ambulatory with assistance physical therapy     Head and neck:  Normal     Lungs: Clear     Heart:  Normal sinus rhythm     Abdomen:  Soft bowel sounds normal     Musculoskeletal:  Right lower extremity shows no drainage discharge redness or evidence infection.  Neurovascular is intact     Impression:  Right hip fracture status post surgery    Plan: Continue physical therapy occupational therapy   normal/regular rate and rhythm/S1 S2 present/no gallops/no rub/no murmur negative

## 2023-03-08 NOTE — DIETITIAN INITIAL EVALUATION ADULT - NS FNS DIET ORDER
Diet, Consistent Carbohydrate Renal w/Evening Snack:   Soft and Bite Sized (SOFTBTSZ)  1500mL Fluid Restriction ([id number])  For patients receiving Renal Replacement - No Protein Restr, No Conc K, No Conc Phos, Low Sodium (RENAL)  Supplement Feeding Modality:  Oral  Nepro Cans or Servings Per Day:  1       Frequency:  Two Times a day (03-08-23 @ 11:35)

## 2023-03-08 NOTE — CONSULT NOTE ADULT - SUBJECTIVE AND OBJECTIVE BOX
HPI    HPI:  72-year-old female with history of A-fib, diabetes, hypertension, hyperlipidemia, end-stage renal disease hemodialysis, CHF, CAD/CABG, PVD sent in from [facility] for admission for right lower extremity angiogram ,patient is scheduled by vascular team for intervention tomorrow and requires cardiology clearance ,labs ,EKG and chest xray .  Patient otherwise feeling well and has no complaints. Last dialysis - yesterday. Patient was recently hospitaliazed with malfunctioning HD fistula , nephrologist contacted . (07 Mar 2023 16:13) On admission  skin is warm, dry and intact; no rashes, wounds, or scars      PAST MEDICAL & SURGICAL HISTORY:  HTN (hypertension)      h/o Anxiety attack      Depression      h/o Myocardial infarct 2007      h/o Hepatitis A 1969  currently resolved, no symptoms      Murmur, cardiac      h/o Smoking  quit 3/2012      Anemia secondary to renal failure      ESRD on dialysis      Falls      Ataxia      Type 2 diabetes mellitus      Peripheral vascular disease, unspecified      CAD (coronary artery disease)      Diabetes      coronary stent 2007      s/p Ovarian cyst removal      s/p surgical removal of benign Skin lesion epigastric area      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      REVIEW OF SYSTEMS  CONSTITUTIONAL: +weakness, no fevers or chills  HEENT: denies blurred visions, sore throat  SKIN: denies new lesions, rash  CARDIOVASCULAR: Denie chest pain, palpitations  RESPIRATORY: denies shortness of breath, sputum production  GASTROINTESTINAL: denies nausea, vomiting, diarrhea, abdominal pain  all other ROS is negative unless indicated above        MEDICATIONS  (STANDING):  aspirin enteric coated 81 milliGRAM(s) Oral daily  atorvastatin 40 milliGRAM(s) Oral at bedtime  cloNIDine 0.1 milliGRAM(s) Oral two times a day  dextrose 5%. 1000 milliLiter(s) (50 mL/Hr) IV Continuous <Continuous>  dextrose 5%. 1000 milliLiter(s) (100 mL/Hr) IV Continuous <Continuous>  dextrose 50% Injectable 25 Gram(s) IV Push once  dextrose 50% Injectable 12.5 Gram(s) IV Push once  dextrose 50% Injectable 25 Gram(s) IV Push once  epoetin fawad-epbx (RETACRIT) Injectable 15987 Unit(s) IV Push <User Schedule>  glucagon  Injectable 1 milliGRAM(s) IntraMuscular once  imipramine 50 milliGRAM(s) Oral daily  insulin glargine Injectable (LANTUS) 5 Unit(s) SubCutaneous at bedtime  insulin lispro (ADMELOG) corrective regimen sliding scale   SubCutaneous every 6 hours  metoprolol tartrate 100 milliGRAM(s) Oral at bedtime  multivitamin 1 Tablet(s) Oral daily  pantoprazole    Tablet 40 milliGRAM(s) Oral before breakfast  senna 2 Tablet(s) Oral at bedtime    MEDICATIONS  (PRN):  acetaminophen     Tablet .. 650 milliGRAM(s) Oral every 6 hours PRN Temp greater or equal to 38C (100.4F), Mild Pain (1 - 3)  aluminum hydroxide/magnesium hydroxide/simethicone Suspension 30 milliLiter(s) Oral every 4 hours PRN Dyspepsia  dextrose Oral Gel 15 Gram(s) Oral once PRN Blood Glucose LESS THAN 70 milliGRAM(s)/deciliter  melatonin 3 milliGRAM(s) Oral at bedtime PRN Insomnia  ondansetron Injectable 4 milliGRAM(s) IV Push every 8 hours PRN Nausea and/or Vomiting  traMADol 50 milliGRAM(s) Oral four times a day PRN Moderate Pain (4 - 6)      Allergies    latex (Hives)  No Known Drug Allergies    Intolerances        SOCIAL HISTORY: Lives in [facility]    FAMILY HISTORY:  FH: myocardial infarction (Father)    FH: myocardial infarction (Father)    Family history of type 2 diabetes mellitus in brother (Sibling)        Vital Signs Last 24 Hrs  T(C): 36.4 (08 Mar 2023 05:21), Max: 36.7 (07 Mar 2023 12:53)  T(F): 97.5 (08 Mar 2023 05:21), Max: 98 (07 Mar 2023 12:53)  HR: 68 (08 Mar 2023 05:21) (68 - 84)  BP: 146/76 (08 Mar 2023 05:21) (140/60 - 169/72)  BP(mean): --  RR: 18 (08 Mar 2023 05:21) (16 - 18)  SpO2: 96% (08 Mar 2023 05:21) (96% - 99%)    Parameters below as of 08 Mar 2023 05:21  Patient On (Oxygen Delivery Method): room air        NAD /   A&Ox3/ Alert  within defined normal limits  Low air loss bed                        9.6    6.73  )-----------( 159      ( 08 Mar 2023 07:33 )             29.2     03-07    132<L>  |  98  |  92<H>  ----------------------------<  209<H>  4.9   |  27  |  8.50<H>    Ca    8.7      07 Mar 2023 13:50    TPro  6.8  /  Alb  3.2<L>  /  TBili  0.4  /  DBili  x   /  AST  17  /  ALT  9<L>  /  AlkPhos  112  03-07    Auto Neutrophil #: 4.26 K/uL (03-08-23 @ 07:33)  Auto Neutrophil #: 5.99 K/uL (03-07-23 @ 13:50)    A1C with Estimated Average Glucose Result: 6.8 % (01-31-23 @ 13:48)      PHYSICAL EXAM:    General: resting comfortably in bed; NAD  ENT: no nasal discharge; hearing intact  Neck: Supple  Extremities: no clubbing or cyanosis; no Lower ext edema : no gross deformities, able to move all four extremities,   Dermatologic: skin warm, dry and intact; no rashes, wounds, or scars  Neurologic: awake, alert,  Can follow commands  Musculoskeletal/Vascular: ROM intact, no deformities/ contractures

## 2023-03-08 NOTE — PROGRESS NOTE ADULT - SUBJECTIVE AND OBJECTIVE BOX
SHILO KOHLER    PLV 1EAS 114 D1    Allergies    latex (Hives)  No Known Drug Allergies    Intolerances        PAST MEDICAL & SURGICAL HISTORY:  HTN (hypertension)      h/o Anxiety attack      Depression      h/o Myocardial infarct 2007      h/o Hepatitis A 1969  currently resolved, no symptoms      Murmur, cardiac      h/o Smoking  quitted 3/2012      Anemia secondary to renal failure      ESRD on dialysis      Falls      Ataxia      Type 2 diabetes mellitus      Peripheral vascular disease, unspecified      CAD (coronary artery disease)      Diabetes      coronary stent 2007      s/p Ovarian cyst removal      s/p surgical removal of benign Skin lesion epigastric area      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          FAMILY HISTORY:  FH: myocardial infarction (Father)    FH: myocardial infarction (Father)    Family history of type 2 diabetes mellitus in brother (Sibling)        Home Medications:  acetaminophen 325 mg oral tablet: 2 tab(s) orally every 6 hours, As needed, Temp greater or equal to 38C (100.4F), Mild Pain (1 - 3) (07 Mar 2023 13:43)  Admelog SoloStar 100 units/mL injectable solution:  (07 Mar 2023 13:43)  aspirin 81 mg oral delayed release tablet: 1 tab(s) orally once a day (at bedtime) (07 Mar 2023 13:43)  atorvastatin 40 mg oral tablet: 1 tab(s) orally once a day (at bedtime) (07 Mar 2023 13:43)  cloNIDine 0.1 mg oral tablet: 1 tab(s) orally 2 times a day (07 Mar 2023 13:43)  imipramine 50 mg oral tablet: 1 tab(s) orally once a day (07 Mar 2023 13:43)  insulin glargine-yfgn 100 units/mL subcutaneous solution: 20 unit(s) subcutaneous once a day (at bedtime) (01 Mar 2023 11:00)  LPS critical care: 17 gram(s) orally 3 times a day (01 Mar 2023 11:00)  Melatonin 3 mg oral tablet: 1 tab(s) orally once a day (at bedtime), As Needed (01 Mar 2023 11:00)  metoprolol tartrate 100 mg oral tablet: 1 tab(s) orally once a day (at bedtime) (01 Mar 2023 11:00)  nephro carb steady: 0.08 gram-1.8 kcal/ml (01 Mar 2023 11:00)  Nephro-Alfie oral tablet: 1 tab(s) orally once a day (01 Mar 2023 11:00)  PANTOPRAZOLE 40MG DR TAB: tab(s) orally once a day (01 Mar 2023 11:00)  Senna 8.6 mg oral tablet: 1 tab(s) orally once a day (at bedtime) (01 Mar 2023 11:00)      MEDICATIONS  (STANDING):  aspirin enteric coated 81 milliGRAM(s) Oral daily  atorvastatin 40 milliGRAM(s) Oral at bedtime  cloNIDine 0.1 milliGRAM(s) Oral two times a day  dextrose 5%. 1000 milliLiter(s) (50 mL/Hr) IV Continuous <Continuous>  dextrose 5%. 1000 milliLiter(s) (100 mL/Hr) IV Continuous <Continuous>  dextrose 50% Injectable 25 Gram(s) IV Push once  dextrose 50% Injectable 12.5 Gram(s) IV Push once  dextrose 50% Injectable 25 Gram(s) IV Push once  epoetin fawad-epbx (RETACRIT) Injectable 55369 Unit(s) IV Push <User Schedule>  glucagon  Injectable 1 milliGRAM(s) IntraMuscular once  imipramine 50 milliGRAM(s) Oral daily  insulin glargine Injectable (LANTUS) 5 Unit(s) SubCutaneous at bedtime  insulin lispro (ADMELOG) corrective regimen sliding scale   SubCutaneous every 6 hours  metoprolol tartrate 100 milliGRAM(s) Oral at bedtime  multivitamin 1 Tablet(s) Oral daily  pantoprazole    Tablet 40 milliGRAM(s) Oral before breakfast  senna 2 Tablet(s) Oral at bedtime    MEDICATIONS  (PRN):  acetaminophen     Tablet .. 650 milliGRAM(s) Oral every 6 hours PRN Temp greater or equal to 38C (100.4F), Mild Pain (1 - 3)  aluminum hydroxide/magnesium hydroxide/simethicone Suspension 30 milliLiter(s) Oral every 4 hours PRN Dyspepsia  dextrose Oral Gel 15 Gram(s) Oral once PRN Blood Glucose LESS THAN 70 milliGRAM(s)/deciliter  melatonin 3 milliGRAM(s) Oral at bedtime PRN Insomnia  ondansetron Injectable 4 milliGRAM(s) IV Push every 8 hours PRN Nausea and/or Vomiting  traMADol 50 milliGRAM(s) Oral four times a day PRN Moderate Pain (4 - 6)      Diet, NPO:   Except Medications (03-08-23 @ 01:04) [Active]  Diet, NPO after Midnight:      NPO Start Date: 07-Mar-2023,   NPO Start Time: 23:59 (03-07-23 @ 16:10) [Active]          Vital Signs Last 24 Hrs  T(C): 36.4 (08 Mar 2023 05:21), Max: 36.7 (07 Mar 2023 12:53)  T(F): 97.5 (08 Mar 2023 05:21), Max: 98 (07 Mar 2023 12:53)  HR: 68 (08 Mar 2023 05:21) (68 - 84)  BP: 146/76 (08 Mar 2023 05:21) (140/60 - 169/72)  BP(mean): --  RR: 18 (08 Mar 2023 05:21) (16 - 18)  SpO2: 96% (08 Mar 2023 05:21) (96% - 99%)    Parameters below as of 08 Mar 2023 05:21  Patient On (Oxygen Delivery Method): room air                  LABS:                        10.9   8.61  )-----------( 166      ( 07 Mar 2023 13:50 )             33.5     03-07    132<L>  |  98  |  92<H>  ----------------------------<  209<H>  4.9   |  27  |  8.50<H>    Ca    8.7      07 Mar 2023 13:50    TPro  6.8  /  Alb  3.2<L>  /  TBili  0.4  /  DBili  x   /  AST  17  /  ALT  9<L>  /  AlkPhos  112  03-07    PT/INR - ( 07 Mar 2023 15:00 )   PT: 11.1 sec;   INR: 0.95 ratio         PTT - ( 07 Mar 2023 15:00 )  PTT:26.6 sec          WBC:  WBC Count: 8.61 K/uL (03-07 @ 13:50)      MICROBIOLOGY:  RECENT CULTURES:              PT/INR - ( 07 Mar 2023 15:00 )   PT: 11.1 sec;   INR: 0.95 ratio         PTT - ( 07 Mar 2023 15:00 )  PTT:26.6 sec    Sodium:  Sodium, Serum: 132 mmol/L (03-07 @ 13:50)      8.50 mg/dL 03-07 @ 13:50      Hemoglobin:  Hemoglobin: 10.9 g/dL (03-07 @ 13:50)      Platelets: Platelet Count - Automated: 166 K/uL (03-07 @ 13:50)      LIVER FUNCTIONS - ( 07 Mar 2023 13:50 )  Alb: 3.2 g/dL / Pro: 6.8 g/dL / ALK PHOS: 112 U/L / ALT: 9 U/L / AST: 17 U/L / GGT: x                 RADIOLOGY & ADDITIONAL STUDIES:      MICROBIOLOGY:  RECENT CULTURES:

## 2023-03-08 NOTE — DIETITIAN INITIAL EVALUATION ADULT - ADD RECOMMEND
1) Continue current diet as ordered  2) Recommend NephroVite daily  3) Monitor po intake, diet tolerance, weight trends, labs, GI function, skin integrity

## 2023-03-08 NOTE — CONSULT NOTE ADULT - RESPIRATORY
Sunscreen Recommendations: Sunscreen with a minimum of 30 SPF, zinc or titanium such as Neutrogena or [employer]. Detail Level: Generalized Detail Level: Zone normal/clear to auscultation bilaterally/no wheezes/no rales/no rhonchi Detail Level: Simple

## 2023-03-08 NOTE — PROGRESS NOTE ADULT - SUBJECTIVE AND OBJECTIVE BOX
Patient is a 72y Female whom presented to the hospital with esrd on hd     PAST MEDICAL & SURGICAL HISTORY:  HTN (hypertension)      h/o Anxiety attack      Depression      h/o Myocardial infarct 2007      h/o Hepatitis A 1969  currently resolved, no symptoms      Murmur, cardiac      h/o Smoking  quitted 3/2012      Anemia secondary to renal failure      ESRD on dialysis      Falls      Ataxia      Type 2 diabetes mellitus      Peripheral vascular disease, unspecified      CAD (coronary artery disease)      Diabetes      coronary stent 2007      s/p Ovarian cyst removal      s/p surgical removal of benign Skin lesion epigastric area      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 1000 milliLiter(s) (50 mL/Hr) IV Continuous <Continuous>  dextrose 5%. 1000 milliLiter(s) (100 mL/Hr) IV Continuous <Continuous>  dextrose 50% Injectable 25 Gram(s) IV Push once  dextrose 50% Injectable 12.5 Gram(s) IV Push once  dextrose 50% Injectable 25 Gram(s) IV Push once  glucagon  Injectable 1 milliGRAM(s) IntraMuscular once  imipramine 50 milliGRAM(s) Oral daily  insulin glargine Injectable (LANTUS) 5 Unit(s) SubCutaneous at bedtime  insulin lispro (ADMELOG) corrective regimen sliding scale   SubCutaneous three times a day before meals  insulin lispro (ADMELOG) corrective regimen sliding scale   SubCutaneous at bedtime  metoprolol tartrate 100 milliGRAM(s) Oral at bedtime  multivitamin 1 Tablet(s) Oral daily  pantoprazole    Tablet 40 milliGRAM(s) Oral before breakfast  senna 2 Tablet(s) Oral at bedtime      Allergies    latex (Hives)  No Known Drug Allergies    Intolerances        SOCIAL HISTORY:  Denies ETOh,Smoking,     FAMILY HISTORY:  FH: myocardial infarction (Father)    FH: myocardial infarction (Father)    Family history of type 2 diabetes mellitus in brother (Sibling)        REVIEW OF SYSTEMS:    CONSTITUTIONAL: No weakness, fevers or chills  RESPIRATORY: No cough, wheezing, hemoptysis; No shortness of breath  CARDIOVASCULAR: No chest pain or palpitations  GASTROINTESTINAL: No abdominal or epigastric pain. No nausea, vomiting,     No diarrhea or constipation. No melena   GENITOURINARY: No dysuria, frequency or hematuria  NEUROLOGICAL: No numbness or weakness  SKIN: dry      MEDICATIONS  (STANDING):  aspirin enteric coated 81 milliGRAM(s) Oral daily  atorvastatin 40 milliGRAM(s) Oral at bedtime  cloNIDine 0.1 milliGRAM(s) Oral two times a day  dextrose 5%. 1000 milliLiter(s) (50 mL/Hr) IV Continuous <Continuous>  dextrose 5%. 1000 milliLiter(s) (100 mL/Hr) IV Continuous <Continuous>  dextrose 50% Injectable 25 Gram(s) IV Push once  dextrose 50% Injectable 12.5 Gram(s) IV Push once  dextrose 50% Injectable 25 Gram(s) IV Push once  epoetin fawad-epbx (RETACRIT) Injectable 83694 Unit(s) IV Push <User Schedule>  glucagon  Injectable 1 milliGRAM(s) IntraMuscular once  imipramine 50 milliGRAM(s) Oral daily  insulin glargine Injectable (LANTUS) 5 Unit(s) SubCutaneous at bedtime  insulin lispro (ADMELOG) corrective regimen sliding scale   SubCutaneous every 6 hours  metoprolol tartrate 100 milliGRAM(s) Oral at bedtime  multivitamin 1 Tablet(s) Oral daily  pantoprazole    Tablet 40 milliGRAM(s) Oral before breakfast  risperiDONE   Tablet 0.5 milliGRAM(s) Oral at bedtime  senna 2 Tablet(s) Oral at bedtime  sodium zirconium cyclosilicate 10 Gram(s) Oral <User Schedule>                            9.6    6.73  )-----------( 159      ( 08 Mar 2023 07:33 )             29.2       CBC Full  -  ( 08 Mar 2023 07:33 )  WBC Count : 6.73 K/uL  RBC Count : 3.11 M/uL  Hemoglobin : 9.6 g/dL  Hematocrit : 29.2 %  Platelet Count - Automated : 159 K/uL  Mean Cell Volume : 93.9 fl  Mean Cell Hemoglobin : 30.9 pg  Mean Cell Hemoglobin Concentration : 32.9 gm/dL  Auto Neutrophil # : 4.26 K/uL  Auto Lymphocyte # : 1.00 K/uL  Auto Monocyte # : 0.85 K/uL  Auto Eosinophil # : 0.48 K/uL  Auto Basophil # : 0.05 K/uL  Auto Neutrophil % : 63.4 %  Auto Lymphocyte % : 14.9 %  Auto Monocyte % : 12.6 %  Auto Eosinophil % : 7.1 %  Auto Basophil % : 0.7 %      03-08    135  |  99  |  111<H>  ----------------------------<  209<H>  5.0   |  26  |  9.80<H>    Ca    8.6      08 Mar 2023 07:33    TPro  5.7<L>  /  Alb  2.7<L>  /  TBili  0.3  /  DBili  x   /  AST  7<L>  /  ALT  <6<L>  /  AlkPhos  91  03-08      CAPILLARY BLOOD GLUCOSE      POCT Blood Glucose.: 109 mg/dL (08 Mar 2023 11:45)  POCT Blood Glucose.: 292 mg/dL (08 Mar 2023 06:03)  POCT Blood Glucose.: 226 mg/dL (07 Mar 2023 21:26)  POCT Blood Glucose.: 194 mg/dL (07 Mar 2023 16:54)      Vital Signs Last 24 Hrs  T(C): 36.4 (08 Mar 2023 10:55), Max: 36.4 (07 Mar 2023 19:33)  T(F): 97.5 (08 Mar 2023 10:55), Max: 97.5 (07 Mar 2023 19:33)  HR: 62 (08 Mar 2023 10:55) (62 - 79)  BP: 132/63 (08 Mar 2023 10:55) (132/63 - 169/72)  BP(mean): --  RR: 18 (08 Mar 2023 10:55) (16 - 18)  SpO2: 99% (08 Mar 2023 10:55) (96% - 99%)    Parameters below as of 08 Mar 2023 10:55  Patient On (Oxygen Delivery Method): room air            PT/INR - ( 08 Mar 2023 07:33 )   PT: 11.6 sec;   INR: 0.99 ratio         PTT - ( 07 Mar 2023 15:00 )  PTT:26.6 sec      PHYSICAL EXAM:    Constitutional: NAD  HEENT: conjunctive   clear   Neck:  No JVD  Respiratory: CTAB  Cardiovascular: S1 and S2  Gastrointestinal: BS+, soft, NT/ND  Extremities: No peripheral edema  Neurological: A/O x 3, no focal deficits  Psychiatric: Normal mood, normal affect  : No Renteria  Skin: No rashes  Access: pos right arm  fistula   partial ray amputation of right great toe

## 2023-03-08 NOTE — PROGRESS NOTE ADULT - PROBLEM SELECTOR PLAN 5
continue home medications ,cardi clearance requested on lokelma biw as per nephrologist recommendations

## 2023-03-08 NOTE — CONSULT NOTE ADULT - SUBJECTIVE AND OBJECTIVE BOX
VASCULAR SURGERY CONSULT NOTE    Patient is a 72y old  Female who presents with a chief complaint of Peripheral vascular disease     (08 Mar 2023 14:05)      HPI:  72-year-old female with history of A-fib, diabetes, hypertension, hyperlipidemia, end-stage renal disease hemodialysis, CHF, CAD/CABG, PVD sent in from [facility] for admission for right lower extremity angiogram ,patient is scheduled by vascular team for intervention tomorrow and requires cardiology clearance ,labs ,EKG and chest xray .  Patient otherwise feeling well and has no complaints. Last dialysis - yesterday. Patient was recently hospitaliazed with malfunctioning HD fistula , nephrologist contacted . (07 Mar 2023 16:13)    CURRENT:   Pt seen at bedside.  Tolerating diet. Plan for angio on friday, patient aware.  No complaints at this time.        PAST MEDICAL & SURGICAL HISTORY:  HTN (hypertension)      h/o Anxiety attack      Depression      h/o Myocardial infarct 2007      h/o Hepatitis A 1969  currently resolved, no symptoms      Murmur, cardiac      h/o Smoking  quitted 3/2012      Anemia secondary to renal failure      ESRD on dialysis      Falls      Ataxia      Type 2 diabetes mellitus      Peripheral vascular disease, unspecified      CAD (coronary artery disease)      Diabetes      coronary stent 2007      s/p Ovarian cyst removal      s/p surgical removal of benign Skin lesion epigastric area      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          Review of Systems:    I have reviewed 9 systems with the patient and the only positive findings were     MEDICATIONS  (STANDING):  aspirin enteric coated 81 milliGRAM(s) Oral daily  atorvastatin 40 milliGRAM(s) Oral at bedtime  cloNIDine 0.1 milliGRAM(s) Oral two times a day  dextrose 5%. 1000 milliLiter(s) (50 mL/Hr) IV Continuous <Continuous>  dextrose 5%. 1000 milliLiter(s) (100 mL/Hr) IV Continuous <Continuous>  dextrose 50% Injectable 25 Gram(s) IV Push once  dextrose 50% Injectable 12.5 Gram(s) IV Push once  dextrose 50% Injectable 25 Gram(s) IV Push once  epoetin fawad-epbx (RETACRIT) Injectable 21336 Unit(s) IV Push <User Schedule>  glucagon  Injectable 1 milliGRAM(s) IntraMuscular once  imipramine 50 milliGRAM(s) Oral daily  insulin glargine Injectable (LANTUS) 5 Unit(s) SubCutaneous at bedtime  insulin lispro (ADMELOG) corrective regimen sliding scale   SubCutaneous every 6 hours  metoprolol tartrate 100 milliGRAM(s) Oral at bedtime  multivitamin 1 Tablet(s) Oral daily  pantoprazole    Tablet 40 milliGRAM(s) Oral before breakfast  risperiDONE   Tablet 0.5 milliGRAM(s) Oral at bedtime  senna 2 Tablet(s) Oral at bedtime  sodium zirconium cyclosilicate 10 Gram(s) Oral <User Schedule>    MEDICATIONS  (PRN):  acetaminophen     Tablet .. 650 milliGRAM(s) Oral every 6 hours PRN Temp greater or equal to 38C (100.4F), Mild Pain (1 - 3)  aluminum hydroxide/magnesium hydroxide/simethicone Suspension 30 milliLiter(s) Oral every 4 hours PRN Dyspepsia  dextrose Oral Gel 15 Gram(s) Oral once PRN Blood Glucose LESS THAN 70 milliGRAM(s)/deciliter  melatonin 3 milliGRAM(s) Oral at bedtime PRN Insomnia  ondansetron Injectable 4 milliGRAM(s) IV Push every 8 hours PRN Nausea and/or Vomiting  traMADol 50 milliGRAM(s) Oral four times a day PRN Moderate Pain (4 - 6)      Allergies    latex (Hives)  No Known Drug Allergies    Intolerances        SOCIAL HISTORY          Smoking: Yes [ ]  No [ X]   ______pk yrs          ETOH  Yes [ ]  No X[ ]  Social [ ]          DRUGS:  Yes [ ]  No [ X]  if so what______________    FAMILY HISTORY:  FH: myocardial infarction (Father)    FH: myocardial infarction (Father)    Family history of type 2 diabetes mellitus in brother (Sibling)        Vital Signs Last 24 Hrs  T(C): 36.4 (08 Mar 2023 10:55), Max: 36.4 (07 Mar 2023 19:33)  T(F): 97.5 (08 Mar 2023 10:55), Max: 97.5 (07 Mar 2023 19:33)  HR: 62 (08 Mar 2023 10:55) (62 - 79)  BP: 132/63 (08 Mar 2023 10:55) (132/63 - 169/72)  BP(mean): --  RR: 18 (08 Mar 2023 10:55) (16 - 18)  SpO2: 99% (08 Mar 2023 10:55) (96% - 99%)    Parameters below as of 08 Mar 2023 10:55  Patient On (Oxygen Delivery Method): room air        LABS:                        9.6    6.73  )-----------( 159      ( 08 Mar 2023 07:33 )             29.2     03-08    135  |  99  |  111<H>  ----------------------------<  209<H>  5.0   |  26  |  9.80<H>    Ca    8.6      08 Mar 2023 07:33    TPro  5.7<L>  /  Alb  2.7<L>  /  TBili  0.3  /  DBili  x   /  AST  7<L>  /  ALT  <6<L>  /  AlkPhos  91  03-08    PT/INR - ( 08 Mar 2023 07:33 )   PT: 11.6 sec;   INR: 0.99 ratio         PTT - ( 07 Mar 2023 15:00 )  PTT:26.6 sec      RADIOLOGY & ADDITIONAL STUDIES:    Risks, benefits, and alternatives to treatment discussed. All questions answered with understanding.

## 2023-03-08 NOTE — CONSULT NOTE ADULT - SUBJECTIVE AND OBJECTIVE BOX
CARDIOLOGY CONSULT NOTE    Patient is a 72y Female with a known history of : pt with pvd -admitted for angiogram of rt leg  PAD (peripheral artery disease) [I73.9]    HTN (hypertension) [I10]    DM2 (diabetes mellitus, type 2) [E11.9]    ESRD on hemodialysis [N18.6]    Afib [I48.91]    CAD (coronary artery disease) [I25.10]    Prophylactic measure [Z29.9]    MDD (major depressive disorder) [F32.9]    Ataxia [R27.0]      HPI:  72-year-old female with history of A-fib, diabetes, hypertension, hyperlipidemia, end-stage renal disease hemodialysis, CHF, CAD/CABG, PVD sent in from [facility] for admission for right lower extremity angiogram ,patient is scheduled by vascular team for intervention tomorrow and requires cardiology clearance ,labs ,EKG and chest xray .  Patient otherwise feeling well and has no complaints. Last dialysis - yesterday. Patient was recently hospitaliazed with malfunctioning HD fistula , nephrologist contacted . (07 Mar 2023 16:13)      REVIEW OF SYSTEMS:    CONSTITUTIONAL: No fever, weight loss, or fatigue  EYES: No eye pain, visual disturbances, or discharge  ENMT:  No difficulty hearing, tinnitus, vertigo; No sinus or throat pain  NECK: No pain or stiffness  BREASTS: No pain, masses, or nipple discharge  RESPIRATORY: No cough, wheezing, chills or hemoptysis; No shortness of breath  CARDIOVASCULAR: No chest pain, palpitations, dizziness, or leg swelling  GASTROINTESTINAL: No abdominal or epigastric pain. No nausea, vomiting, or hematemesis; No diarrhea or constipation. No melena or hematochezia.  GENITOURINARY: No dysuria, frequency, hematuria, or incontinence  NEUROLOGICAL: No headaches, memory loss, loss of strength, numbness, or tremors  SKIN: No itching, burning, rashes, or lesions   LYMPH NODES: No enlarged glands  ENDOCRINE: No heat or cold intolerance; No hair loss  MUSCULOSKELETAL: No joint pain or swelling; No muscle, back, or extremity pain  PSYCHIATRIC: No depression, anxiety, mood swings, or difficulty sleeping  HEME/LYMPH: No easy bruising, or bleeding gums  ALLERGY AND IMMUNOLOGIC: No hives or eczema    MEDICATIONS  (STANDING):  aspirin enteric coated 81 milliGRAM(s) Oral daily  atorvastatin 40 milliGRAM(s) Oral at bedtime  cloNIDine 0.1 milliGRAM(s) Oral two times a day  dextrose 5%. 1000 milliLiter(s) (50 mL/Hr) IV Continuous <Continuous>  dextrose 5%. 1000 milliLiter(s) (100 mL/Hr) IV Continuous <Continuous>  dextrose 50% Injectable 25 Gram(s) IV Push once  dextrose 50% Injectable 12.5 Gram(s) IV Push once  dextrose 50% Injectable 25 Gram(s) IV Push once  epoetin fawad-epbx (RETACRIT) Injectable 64637 Unit(s) IV Push <User Schedule>  glucagon  Injectable 1 milliGRAM(s) IntraMuscular once  imipramine 50 milliGRAM(s) Oral daily  insulin glargine Injectable (LANTUS) 5 Unit(s) SubCutaneous at bedtime  insulin lispro (ADMELOG) corrective regimen sliding scale   SubCutaneous every 6 hours  metoprolol tartrate 100 milliGRAM(s) Oral at bedtime  multivitamin 1 Tablet(s) Oral daily  pantoprazole    Tablet 40 milliGRAM(s) Oral before breakfast  senna 2 Tablet(s) Oral at bedtime    MEDICATIONS  (PRN):  acetaminophen     Tablet .. 650 milliGRAM(s) Oral every 6 hours PRN Temp greater or equal to 38C (100.4F), Mild Pain (1 - 3)  aluminum hydroxide/magnesium hydroxide/simethicone Suspension 30 milliLiter(s) Oral every 4 hours PRN Dyspepsia  dextrose Oral Gel 15 Gram(s) Oral once PRN Blood Glucose LESS THAN 70 milliGRAM(s)/deciliter  melatonin 3 milliGRAM(s) Oral at bedtime PRN Insomnia  ondansetron Injectable 4 milliGRAM(s) IV Push every 8 hours PRN Nausea and/or Vomiting  traMADol 50 milliGRAM(s) Oral four times a day PRN Moderate Pain (4 - 6)      ALLERGIES: latex (Hives)  No Known Drug Allergies      Social History:     FAMILY HISTORY:  FH: myocardial infarction (Father)    FH: myocardial infarction (Father)    Family history of type 2 diabetes mellitus in brother (Sibling)        PAST MEDICAL & SURGICAL HISTORY:  HTN (hypertension)      h/o Anxiety attack      Depression      h/o Myocardial infarct 2007      h/o Hepatitis A 1969  currently resolved, no symptoms      Murmur, cardiac      h/o Smoking  quitted 3/2012      Anemia secondary to renal failure      ESRD on dialysis      Falls      Ataxia      Type 2 diabetes mellitus      Peripheral vascular disease, unspecified      CAD (coronary artery disease)      Diabetes      coronary stent 2007      s/p Ovarian cyst removal      s/p surgical removal of benign Skin lesion epigastric area      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            PHYSICAL EXAMINATION:  -----------------------------  T(C): 36.4 (03-08-23 @ 05:21), Max: 36.7 (03-07-23 @ 12:53)  HR: 68 (03-08-23 @ 05:21) (68 - 84)  BP: 146/76 (03-08-23 @ 05:21) (140/60 - 169/72)  RR: 18 (03-08-23 @ 05:21) (16 - 18)  SpO2: 96% (03-08-23 @ 05:21) (96% - 99%)  Wt(kg): --    Height (cm): 175.3 (03-07 @ 12:53)  Weight (kg): 50.3 (03-07 @ 12:53)  BMI (kg/m2): 16.4 (03-07 @ 12:53)  BSA (m2): 1.61 (03-07 @ 12:53)    Constitutional: well developed, normal appearance, well groomed, well nourished, no deformities and no acute distress.   Eyes: the conjunctiva exhibited no abnormalities and the eyelids demonstrated no xanthelasmas.   HEENT: normal oral mucosa, no oral pallor and no oral cyanosis.   Neck: normal jugular venous A waves present, normal jugular venous V waves present and no jugular venous wilson A waves.   Pulmonary: no respiratory distress, normal respiratory rhythm and effort, no accessory muscle use and lungs were clear to auscultation bilaterally.   Cardiovascular: heart rate and rhythm were normal, normal S1 and S2 and no murmur, gallop, rub, heave or thrill are present.   Abdomen: soft, non-tender, no hepato-splenomegaly and no abdominal mass palpated.   Musculoskeletal: the gait could not be assessed..   Extremities: no clubbing of the fingernails, no localized cyanosis, no petechial hemorrhages and no ischemic changes.   Skin: normal skin color and pigmentation, no rash, no venous stasis, no skin lesions, no skin ulcer and no xanthoma was observed.   Psychiatric: oriented to person, place, and time, the affect was normal, the mood was normal and not feeling anxious.     LABS:   --------  03-07    132<L>  |  98  |  92<H>  ----------------------------<  209<H>  4.9   |  27  |  8.50<H>    Ca    8.7      07 Mar 2023 13:50    TPro  6.8  /  Alb  3.2<L>  /  TBili  0.4  /  DBili  x   /  AST  17  /  ALT  9<L>  /  AlkPhos  112  03-07                         10.9   8.61  )-----------( 166      ( 07 Mar 2023 13:50 )             33.5     PT/INR - ( 07 Mar 2023 15:00 )   PT: 11.1 sec;   INR: 0.95 ratio         PTT - ( 07 Mar 2023 15:00 )  PTT:26.6 sec            RADIOLOGY:  -----------------        ECG:     ECHO

## 2023-03-08 NOTE — DIETITIAN NUTRITION RISK NOTIFICATION - TREATMENT: THE FOLLOWING DIET HAS BEEN RECOMMENDED
Diet, Consistent Carbohydrate Renal w/Evening Snack:   Soft and Bite Sized (SOFTBTSZ)  1500mL Fluid Restriction ([id number])  For patients receiving Renal Replacement - No Protein Restr, No Conc K, No Conc Phos, Low Sodium (RENAL)  Supplement Feeding Modality:  Oral  Nepro Cans or Servings Per Day:  1       Frequency:  Two Times a day (03-08-23 @ 11:35) [Active]

## 2023-03-08 NOTE — PHARMACOTHERAPY INTERVENTION NOTE - COMMENTS
Medication reconciliation was completed by pharmacy representative. The following discrepancies were discussed with Dr. Larson.    Facility Med	Current Order/Not Ordered  Risperidone 0.5mg 1T HS	Not ordered   Lokelma 10g 1 pkt Wed and Sat 	Not ordered    MD aware and would like to start both medications inpatient

## 2023-03-08 NOTE — DIETITIAN INITIAL EVALUATION ADULT - PERTINENT LABORATORY DATA
03-08    135  |  99  |  111<H>  ----------------------------<  209<H>  5.0   |  26  |  9.80<H>    Ca    8.6      08 Mar 2023 07:33    TPro  5.7<L>  /  Alb  2.7<L>  /  TBili  0.3  /  DBili  x   /  AST  7<L>  /  ALT  <6<L>  /  AlkPhos  91  03-08  POCT Blood Glucose.: 109 mg/dL (03-08-23 @ 11:45)  A1C with Estimated Average Glucose Result: 6.8 % (01-31-23 @ 13:48)  A1C with Estimated Average Glucose Result: 9.3 % (09-13-22 @ 07:28)  A1C with Estimated Average Glucose Result: 9.0 % (06-17-22 @ 10:44)

## 2023-03-08 NOTE — CONSULT NOTE ADULT - MUSCULOSKELETAL
negative ROM intact/strength 5/5 bilateral upper extremities/strength 5/5 bilateral lower extremities

## 2023-03-08 NOTE — DIETITIAN INITIAL EVALUATION ADULT - ORAL INTAKE PTA/DIET HISTORY
Pt admitted from [facility]. Reviewed transfer documents, pt was on regular consistency renal, NCS diet. Nepro supplement 4x/day. LPS 30ml TID. Pt reports eating fairly well PTA. Reports overall decrease in po intake PTA 2/2 dislike of food at facility. Typically consumes 3 small portion meals/day.

## 2023-03-08 NOTE — CONSULT NOTE ADULT - PROBLEM SELECTOR RECOMMENDATION 9
Plan for angio on friday  Continue current medical treatment per primary team   Will pre op tomorrow 3/9  Will need medical and cardiac clearance prior to procedure.   Will follow  DIscussed with Dr. Corley/Vascular team

## 2023-03-08 NOTE — CONSULT NOTE ADULT - REASON FOR ADMISSION
admitted for RLE angiogram

## 2023-03-08 NOTE — PROGRESS NOTE ADULT - SUBJECTIVE AND OBJECTIVE BOX
PROGRESS NOTE  Patient is a 72y old  Female who presents with a chief complaint of Peripheral vascular disease     (08 Mar 2023 14:05)    Chart and available morning labs /imaging are reviewed electronically , urgent issues addressed . More information  is being added upon completion of rounds , when more information is collected and management discussed with consultants , medical staff and social service/case management on the floor   OVERNIGHT  No new issues reported by medical staff . All above noted Patient is resting in a bed comfortably .Confused ,poor mentation .No distress noted     HPI:  72-year-old female with history of A-fib, diabetes, hypertension, hyperlipidemia, end-stage renal disease hemodialysis, CHF, CAD/CABG, PVD sent in from [facility] for admission for right lower extremity angiogram ,patient is scheduled by vascular team for intervention tomorrow and requires cardiology clearance ,labs ,EKG and chest xray .  Patient otherwise feeling well and has no complaints. Last dialysis - yesterday. Patient was recently hospitaliazed with malfunctioning HD fistula , nephrologist contacted . (07 Mar 2023 16:13)    PAST MEDICAL & SURGICAL HISTORY:  HTN (hypertension)      h/o Anxiety attack      Depression      h/o Myocardial infarct 2007      h/o Hepatitis A 1969  currently resolved, no symptoms      Murmur, cardiac      h/o Smoking  quitted 3/2012      Anemia secondary to renal failure      ESRD on dialysis      Falls      Ataxia      Type 2 diabetes mellitus      Peripheral vascular disease, unspecified      CAD (coronary artery disease)      Diabetes      coronary stent 2007      s/p Ovarian cyst removal      s/p surgical removal of benign Skin lesion epigastric area      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 1000 milliLiter(s) (50 mL/Hr) IV Continuous <Continuous>  dextrose 5%. 1000 milliLiter(s) (100 mL/Hr) IV Continuous <Continuous>  dextrose 50% Injectable 25 Gram(s) IV Push once  dextrose 50% Injectable 12.5 Gram(s) IV Push once  dextrose 50% Injectable 25 Gram(s) IV Push once  epoetin fawad-epbx (RETACRIT) Injectable 22713 Unit(s) IV Push <User Schedule>  glucagon  Injectable 1 milliGRAM(s) IntraMuscular once  heparin   Injectable 5000 Unit(s) SubCutaneous every 12 hours  imipramine 50 milliGRAM(s) Oral daily  insulin glargine Injectable (LANTUS) 5 Unit(s) SubCutaneous at bedtime  insulin lispro (ADMELOG) corrective regimen sliding scale   SubCutaneous every 6 hours  metoprolol tartrate 100 milliGRAM(s) Oral at bedtime  multivitamin 1 Tablet(s) Oral daily  pantoprazole    Tablet 40 milliGRAM(s) Oral before breakfast  risperiDONE   Tablet 0.5 milliGRAM(s) Oral at bedtime  senna 2 Tablet(s) Oral at bedtime  sodium zirconium cyclosilicate 10 Gram(s) Oral <User Schedule>    MEDICATIONS  (PRN):  acetaminophen     Tablet .. 650 milliGRAM(s) Oral every 6 hours PRN Temp greater or equal to 38C (100.4F), Mild Pain (1 - 3)  aluminum hydroxide/magnesium hydroxide/simethicone Suspension 30 milliLiter(s) Oral every 4 hours PRN Dyspepsia  dextrose Oral Gel 15 Gram(s) Oral once PRN Blood Glucose LESS THAN 70 milliGRAM(s)/deciliter  melatonin 3 milliGRAM(s) Oral at bedtime PRN Insomnia  ondansetron Injectable 4 milliGRAM(s) IV Push every 8 hours PRN Nausea and/or Vomiting  traMADol 50 milliGRAM(s) Oral four times a day PRN Moderate Pain (4 - 6)      OBJECTIVE    T(C): 36.4 (03-08-23 @ 10:55), Max: 36.4 (03-07-23 @ 19:33)  HR: 62 (03-08-23 @ 10:55) (62 - 79)  BP: 132/63 (03-08-23 @ 10:55) (132/63 - 169/72)  RR: 18 (03-08-23 @ 10:55) (16 - 18)  SpO2: 99% (03-08-23 @ 10:55) (96% - 99%)  Wt(kg): --  I&O's Summary        REVIEW OF SYSTEMS:  Patient is  unable to provide any information/ROS  due to baseline mental status.     PHYSICAL EXAM:  Appearance: NAD. VS past 24 hrs -as above   HEENT:   Moist oral mucosa. Conjunctiva clear b/l.   Neck : supple  Respiratory: Lungs CTAB.  Gastrointestinal:  Soft, nontender. No rebound. No rigidity. BS present	  Cardiovascular: RRR ,S1S2 present  Neurologic: Non-focal. Moving all extremities.  Extremities: No edema. No erythema. No calf tenderness.  Skin: No rashes, No ecchymoses, No cyanosis.	  wounds ,skin lesions-See skin assesment flow sheet   LABS:                        9.6    6.73  )-----------( 159      ( 08 Mar 2023 07:33 )             29.2     03-08    135  |  99  |  111<H>  ----------------------------<  209<H>  5.0   |  26  |  9.80<H>    Ca    8.6      08 Mar 2023 07:33    TPro  5.7<L>  /  Alb  2.7<L>  /  TBili  0.3  /  DBili  x   /  AST  7<L>  /  ALT  <6<L>  /  AlkPhos  91  03-08    CAPILLARY BLOOD GLUCOSE      POCT Blood Glucose.: 109 mg/dL (08 Mar 2023 11:45)  POCT Blood Glucose.: 292 mg/dL (08 Mar 2023 06:03)  POCT Blood Glucose.: 226 mg/dL (07 Mar 2023 21:26)  POCT Blood Glucose.: 194 mg/dL (07 Mar 2023 16:54)    PT/INR - ( 08 Mar 2023 07:33 )   PT: 11.6 sec;   INR: 0.99 ratio         PTT - ( 07 Mar 2023 15:00 )  PTT:26.6 sec      RADIOLOGY & ADDITIONAL TESTS:   reviewed elctronically  ASSESSMENT/PLAN: 	    25 minutes aggregate time was spent on this visit, 50% visit time spent in care co-ordination with other attendings and counselling patient .I have discussed care plan with patient / HCP/family member ,who expressed understanding of problems treatment and their effect and side effects, alternatives in details. I have asked if they have any questions and concerns and appropriately addressed them to best of my ability.

## 2023-03-08 NOTE — PROGRESS NOTE ADULT - PROBLEM SELECTOR PLAN 1
Seen  by vascular teem 02/24 during previous admission PAD S/P R-->L bifem-fem BK pop bypass, recent fempop bypass (6/21/2022), and partial ray amputation right great toe (6/22/2022), Further imaging /workup as per vacular team ,booked for angiogram 03/08/23 Seen  by vascular teem 02/24 during previous admission PAD S/P R-->L bifem-fem BK pop bypass, recent fempop bypass (6/21/2022), and partial ray amputation right great toe (6/22/2022), Further imaging /workup as per vacular team ,booked for angiogram 03/10/23-Patient is optimized from medical and cardiovascular standpoint to proceed with planned vascular interventions( angiogram/angioplasty)  with routine hemodynamic monitoring.

## 2023-03-08 NOTE — DIETITIAN INITIAL EVALUATION ADULT - ETIOLOGY
related to decreased ability to consume sufficient energy related to increased demand for nutrient (energy/protein/vitamins)

## 2023-03-08 NOTE — DIETITIAN NUTRITION RISK NOTIFICATION - PHYSICAL ASSESSMENT TEMPLES
[FreeTextEntry1] : DM2 with obesity and HTN, HLD. Had bariatric surgery and lost 100 lbs. \par \par Dm2:  a1c 7.2 due to recent steroids courses  \par continue synjardy 12.5 / 1000 mg BID\par continue ozempic to 1 mg weekly.\par microalb spot normal. \par continue walking daily 2 miles \par Bgs 2x day \par eye exam utd \par \par Bp: at target usually.  \par \par hypoT/Hashimoto's:  continue lT4 125 mcg qd and Sundays 1/2 pill. \par \par HLD: lipids excellent. continue pravastatin 20 mg qd \par low fat/low cholesterol diet\par \par obesity: s/p bariatric surgery. reviewed diet and exercise.\par \par vitamin d defic: continue vitamin d 4000 u qd \par \par all lab results reviewed and discussed with patient with extensive discussion. All questions answered\par Laboratory tests ordered today\par Diagnosis and prognosis discussed\par Continue other current medications \par \par \par \par \par \par \par \par \par \par  [Exercise/Effect on Glucose] : exercise/effect on glucose [Retinopathy Screening] : Patient was referred to ophthalmology for retinopathy screening moderate

## 2023-03-08 NOTE — CONSULT NOTE ADULT - ASSESSMENT
Initial evaluation/Pulmonary Critical Care consultation requested on  3/7/2023 by Dr PRESTON   from Dr Landeros   Patient examined chart reviewed    HOSPITAL ADMISSION   PATIENT CAME  FROM (if information available)        REVIEW OF SYMPTOMS      Able to give ROS  Yes     RELIABLE +/-   CONSTITUTIONAL Weakness Yes  Chills No   ENDOCRINE  No heat or cold intolerance    ALLERGY No hives  Sore throat No stridor  RESP Coughing blood no  Shortness of breath YES   NEURO No Headache  Confusion Pain neck No   CARDIAC No Chest pain No Palpitations   GI  Pain abdomen NO   Vomiting NO     NOTABLE FINDINGS    PHYSICAL EXAM    HEENT Unremarkable  atraumatic   RESP Fair air entry EXP prolonged    Harsh breath sound Resp distres mild   CARDIAC S1 S2 No S3     NO JVD    ABDOMEN SOFT BS PRESENT NOT DISTENDED No hepatosplenomegaly PEDAL EDEMA present No calf tenderness  NO rash       GENERAL DATA .   GOC.   3/7/2023 full code  ALLGY.  latex                      WT.  3/7/2023 50   BMI.     3/7/2023 16             ICU STAY. .. none  COVID. ..      BEST PRACTICE ISSUES.    HOB ELEVATN. Yes  DVT PPLX. ..      ELDER PPLX. .. 3/7/2023 protonix 40      INFN PPLX. ..    SP SW AMINAH.         DIET.  .. 3/7/2023 cons carb renal    IV fl...        ABGS.    VS/ PO/IO/ VENT/ DRIPS.   3/7/2023 afeb 84 140/60   3/7/2023 ra 97%        PATIENT PRESENTATION.  72-year-old female with history of A-fib, diabetes, hypertension, hyperlipidemia, end-stage renal disease hemodialysis, CHF, CAD/CABG, PVD sent in from [facility] for admission for right lower extremity angiogram.  Patient otherwise feeling well and has no complaints. Last dialysis - 3/6/2023  Pulm consulted 3/7/2023 for periop care .         AGE SEX DOA C/C.  72 f   3/7/2023   Preop angio lower extremity     OUTSIDE PULM MD.   3/7/2023 None   3/7/2023 Pt from [facility]     PM .  CAD.  .. HISTORY ho cabg  A fib  .. 3/7/2023 Not on ac sec multiple falls  PAD  .. sp R-> L bifem - fem BK pop bypass   .. Fem pop bypass 6/21/2022   .. Partial ray amputation r great toe 6/22/2022   ESRD   .. On HD     HOME MEDS include  .. Clonidine .1 x 2   .. ASA 81   .. atorvastat 40   .. Metoprolol 100   .. protonix   .. Imipramine 50   .     PROBLEM/ASSESSMENT/PLAN.  INFECTION.  .. W 3/7/2023 w 8.6  .. cxr 3/7/2023 cxr napd   .. No active infection suspecetd   CAD.  .. HISTORY ho cabg  .. EKG 3/7/2023 nsr st t abn consider lateral ischemia qtc  .. 3/7/2023 ASA 81   .. 3/7/2023 atorvastat 40   .. 3/7/2023 Metoprolol 100   .. continue   Hypertension.  .. 3/7/2023 clonidine .1 x 2   .. under control  A fib  .. 3/7/2023 ASA 81   .. 3/7/2023 Metoprolol 100   .. 3/7/2023 Not on ac sec multiple falls  .. rate control  Hemat.  .. Hb 3/7/2023 Hb 10.9  .. Mcv 3/7/2023 mcv 94   .. Inr 3/7/2023 inr .95  .. monitor   Renal.  .. Na 3/7/2023 Na 132   .. Cr 3/7/2023 Cr 8.5  ESRD.  .. HD done 3/6   R arm av fistula.  .. [facility] Duplex rue 2/23/2023 Narrowing outflow cephalic vein sec to local thrombus mid forearm and decreased flow volumes   PAD  .. 3/7/2023 For angio  PREOP  .. 3/7/2023 In optimal pulm status for planned angio to evaluate pad  .       OVERALL .  A fib  .. 3/7/2023 Not on ac sec multiple falls  ESRD  .. On HD   PREOP  .. 3/7/2023 In optimal pulm status for planned angio to evaluate pad  .       TIME SPENT   Over 55 minutes aggregate care time spent on encounter; activities included   direct patient care, counseling and/or coordinating care reviewing notes, lab data/ imaging , discussion with multidisciplinary team/ patient  /family and explaining in detail risks, benefits, alternatives  of the recommendations     JOSE F LAO 71 f 3/7/2023 1948 DR HARRY ALBRIGHT     
  72-year-old female with history of A-fib, diabetes, hypertension, hyperlipidemia, end-stage renal disease hemodialysis, CHF, CAD/CABG, PVD sent in from [facility] for admission for right lower extremity angiogram ,patient is scheduled by vascular team for intervention tomorrow and requires cardiology clearance ,labs ,EKG and chest xray .  Patient otherwise feeling well and has no complaints. Last dialysis - yesterday. Patient was recently hospitaliazed with malfunctioning HD fistula , nephrologist contacted . (07 Mar 2023 16:13)    esrd on hd , need to optimized  fistula    Excess fluids and waste products will be removed from your blood; your electrolytes will be balanced; your blood pressure will be controlled.    BP monitoring,continue current antihypertensive meds, low salt diet,followup with PMD in 1-2 weeks    Accuchecks monitoring and insulin sliding scale coverage, no concentrated sweets diet, serial labs and f/up with PMD, monitor HB A 1 C every 3-4 mnth    ANEMIA PLAN:  Anemia of chronic disease:    We will continue epo aiming for a HCT of 32-36 %.   We will monitor Iron stores, B12 and RBC folate .        
Patient presents with  skin warm, dry and intact; no rashes, wounds, or scars  At risk for altered tissue perfusion YES  Impaired perfusion of peripheral tissue YES  Continue  Nutrition (as tolerated)  Continue  Offloading   Continue Pericare  Apply cair boots at all times while in bed.   Provide skin checks and foot placement q8h.  Care as per medicine will follow w/ you   Findings and recommendations discussed with LEV Radford   Thank you for this consult  Sayra Christensen NP, [facility] [phone]
pt with pvd-scheduled for angiogram  ashd  s/p cabg  recent coronary  angiogram -patent by- pass graft  chf - compensated  esrd- on hd  dm2  hypertension  dyslipidemia  cardiac status stable low risk for bypass rt leg if needed 3/8
71 yo female with pmhx of  A-fib, diabetes, hypertension, hyperlipidemia, end-stage renal disease hemodialysis, CHF, CAD/CABG, PVD sent in from [facility] for admission for right lower extremity angiogram. Plan for angio on friday with Vascular team.

## 2023-03-08 NOTE — DIETITIAN INITIAL EVALUATION ADULT - SIGNS/SYMPTOMS
as evidenced by ~20% wt loss x 1 year, NFPE findings- mild/moderate muscle depletion/fat loss. as evidenced by ESRD on HD.

## 2023-03-08 NOTE — DIETITIAN INITIAL EVALUATION ADULT - PERTINENT MEDS FT
MEDICATIONS  (STANDING):  aspirin enteric coated 81 milliGRAM(s) Oral daily  atorvastatin 40 milliGRAM(s) Oral at bedtime  cloNIDine 0.1 milliGRAM(s) Oral two times a day  dextrose 5%. 1000 milliLiter(s) (50 mL/Hr) IV Continuous <Continuous>  dextrose 5%. 1000 milliLiter(s) (100 mL/Hr) IV Continuous <Continuous>  dextrose 50% Injectable 25 Gram(s) IV Push once  dextrose 50% Injectable 12.5 Gram(s) IV Push once  dextrose 50% Injectable 25 Gram(s) IV Push once  epoetin fawad-epbx (RETACRIT) Injectable 14318 Unit(s) IV Push <User Schedule>  glucagon  Injectable 1 milliGRAM(s) IntraMuscular once  imipramine 50 milliGRAM(s) Oral daily  insulin glargine Injectable (LANTUS) 5 Unit(s) SubCutaneous at bedtime  insulin lispro (ADMELOG) corrective regimen sliding scale   SubCutaneous every 6 hours  metoprolol tartrate 100 milliGRAM(s) Oral at bedtime  multivitamin 1 Tablet(s) Oral daily  pantoprazole    Tablet 40 milliGRAM(s) Oral before breakfast  risperiDONE   Tablet 0.5 milliGRAM(s) Oral at bedtime  senna 2 Tablet(s) Oral at bedtime  sodium zirconium cyclosilicate 10 Gram(s) Oral <User Schedule>    MEDICATIONS  (PRN):  acetaminophen     Tablet .. 650 milliGRAM(s) Oral every 6 hours PRN Temp greater or equal to 38C (100.4F), Mild Pain (1 - 3)  aluminum hydroxide/magnesium hydroxide/simethicone Suspension 30 milliLiter(s) Oral every 4 hours PRN Dyspepsia  dextrose Oral Gel 15 Gram(s) Oral once PRN Blood Glucose LESS THAN 70 milliGRAM(s)/deciliter  melatonin 3 milliGRAM(s) Oral at bedtime PRN Insomnia  ondansetron Injectable 4 milliGRAM(s) IV Push every 8 hours PRN Nausea and/or Vomiting  traMADol 50 milliGRAM(s) Oral four times a day PRN Moderate Pain (4 - 6)

## 2023-03-09 ENCOUNTER — TRANSCRIPTION ENCOUNTER (OUTPATIENT)
Age: 73
End: 2023-03-09

## 2023-03-09 LAB
ANION GAP SERPL CALC-SCNC: 6 MMOL/L — SIGNIFICANT CHANGE UP (ref 5–17)
BUN SERPL-MCNC: 50 MG/DL — HIGH (ref 7–23)
CALCIUM SERPL-MCNC: 8.9 MG/DL — SIGNIFICANT CHANGE UP (ref 8.5–10.1)
CHLORIDE SERPL-SCNC: 98 MMOL/L — SIGNIFICANT CHANGE UP (ref 96–108)
CO2 SERPL-SCNC: 31 MMOL/L — SIGNIFICANT CHANGE UP (ref 22–31)
CREAT SERPL-MCNC: 5.5 MG/DL — HIGH (ref 0.5–1.3)
EGFR: 8 ML/MIN/1.73M2 — LOW
GLUCOSE SERPL-MCNC: 185 MG/DL — HIGH (ref 70–99)
POTASSIUM SERPL-MCNC: 4.2 MMOL/L — SIGNIFICANT CHANGE UP (ref 3.5–5.3)
POTASSIUM SERPL-SCNC: 4.2 MMOL/L — SIGNIFICANT CHANGE UP (ref 3.5–5.3)
SODIUM SERPL-SCNC: 135 MMOL/L — SIGNIFICANT CHANGE UP (ref 135–145)

## 2023-03-09 PROCEDURE — 99232 SBSQ HOSP IP/OBS MODERATE 35: CPT

## 2023-03-09 PROCEDURE — 99233 SBSQ HOSP IP/OBS HIGH 50: CPT

## 2023-03-09 RX ORDER — INSULIN LISPRO 100/ML
VIAL (ML) SUBCUTANEOUS EVERY 6 HOURS
Refills: 0 | Status: DISCONTINUED | OUTPATIENT
Start: 2023-03-09 | End: 2023-03-11

## 2023-03-09 RX ORDER — LANOLIN ALCOHOL/MO/W.PET/CERES
5 CREAM (GRAM) TOPICAL ONCE
Refills: 0 | Status: DISCONTINUED | OUTPATIENT
Start: 2023-03-09 | End: 2023-03-09

## 2023-03-09 RX ORDER — PANTOPRAZOLE SODIUM 20 MG/1
40 TABLET, DELAYED RELEASE ORAL ONCE
Refills: 0 | Status: DISCONTINUED | OUTPATIENT
Start: 2023-03-09 | End: 2023-03-10

## 2023-03-09 RX ORDER — SODIUM CHLORIDE 9 MG/ML
1000 INJECTION INTRAMUSCULAR; INTRAVENOUS; SUBCUTANEOUS
Refills: 0 | Status: DISCONTINUED | OUTPATIENT
Start: 2023-03-10 | End: 2023-03-10

## 2023-03-09 RX ORDER — CEFAZOLIN SODIUM 1 G
2000 VIAL (EA) INJECTION ONCE
Refills: 0 | Status: COMPLETED | OUTPATIENT
Start: 2023-03-10 | End: 2023-03-10

## 2023-03-09 RX ADMIN — Medication 5: at 11:38

## 2023-03-09 RX ADMIN — Medication 4: at 17:37

## 2023-03-09 RX ADMIN — Medication 1: at 06:31

## 2023-03-09 RX ADMIN — Medication 81 MILLIGRAM(S): at 11:37

## 2023-03-09 RX ADMIN — Medication 0.1 MILLIGRAM(S): at 06:31

## 2023-03-09 RX ADMIN — Medication 2: at 00:43

## 2023-03-09 RX ADMIN — Medication 50 MILLIGRAM(S): at 12:15

## 2023-03-09 RX ADMIN — PANTOPRAZOLE SODIUM 40 MILLIGRAM(S): 20 TABLET, DELAYED RELEASE ORAL at 06:30

## 2023-03-09 RX ADMIN — ATORVASTATIN CALCIUM 40 MILLIGRAM(S): 80 TABLET, FILM COATED ORAL at 21:16

## 2023-03-09 RX ADMIN — Medication 0.1 MILLIGRAM(S): at 17:37

## 2023-03-09 RX ADMIN — RISPERIDONE 0.5 MILLIGRAM(S): 4 TABLET ORAL at 21:16

## 2023-03-09 RX ADMIN — Medication 3 MILLIGRAM(S): at 21:16

## 2023-03-09 RX ADMIN — Medication 1 TABLET(S): at 11:37

## 2023-03-09 RX ADMIN — SENNA PLUS 2 TABLET(S): 8.6 TABLET ORAL at 21:16

## 2023-03-09 RX ADMIN — INSULIN GLARGINE 5 UNIT(S): 100 INJECTION, SOLUTION SUBCUTANEOUS at 22:06

## 2023-03-09 RX ADMIN — Medication 100 MILLIGRAM(S): at 21:16

## 2023-03-09 NOTE — PATIENT CHOICE NOTE. - NSPTCHOICESTATE_GEN_ALL_CORE

## 2023-03-09 NOTE — SWALLOW BEDSIDE ASSESSMENT ADULT - ASR SWALLOW DENTITION
Per patient report, has been consuming regular solids without lower denture for a "long time"/upper dentures

## 2023-03-09 NOTE — SWALLOW BEDSIDE ASSESSMENT ADULT - SWALLOW EVAL: RECOMMENDED FEEDING/EATING TECHNIQUES
allow for swallow between intakes/alternate food with liquid/check mouth frequently for oral residue/pocketing/hard swallow w/ each bite or sip/maintain upright posture during/after eating for 30 mins/oral hygiene/position upright (90 degrees)/small sips/bites

## 2023-03-09 NOTE — SWALLOW BEDSIDE ASSESSMENT ADULT - SWALLOW EVAL: DIAGNOSIS
1. Functional oral phase for puree, regular solids and thin liquids marked by adequate acceptance and containment, adequate mastication of solids, adequate oral transit and adequate oral clearance. 2. Functional pharyngeal phase for aforementioned consistencies marked by hyolaryngeal excursion present upon palpation and no overt s/s of penetration/aspiration evidenced.

## 2023-03-09 NOTE — CARE COORDINATION ASSESSMENT. - NSCAREPROVIDERS_GEN_ALL_CORE_FT
CARE PROVIDERS:  Accepting Physician: Diamond Larson  Administration: Asael Hernandez  Administration: Hira Lacy  Administration: Nicolasa Lima  Admitting: Diamond Larson  Attending: Diamond Larson  Case Management: Stanford Wilkerson  Consultant: Weil, Patricia  Consultant: Sayra Christensen  Consultant: Art Landeros  Consultant: Justine Barahona  Consultant: Joseluis James  Consultant: Ana Luisa Neal  Consultant: Faisal Pastor  Consultant: Rito Mehta  Covering Team: Eloisa Pierre  ED ACP: Chichi Reyes  ED Attending: Dena Small  ED Nurse: Jessica Weiner  Nurse: Roxana Blank  Nurse: Romulo Bean  Nurse: Ar Landaverde  Nurse: Joana Gutiérrez  Ordered: Doctor, Unknown  Ordered: ADM, User  Ordered: Pahlavan, Mohsen  Outpatient Provider: Dev Shepherd  Outpatient Provider: Diamond Larson  Outpatient Provider: Pahlavan, Mohsen  Outpatient Provider: Pahlavan, Mohsen  PCA/Nursing Assistant: Dena Calderon  Physical Therapy: Yovanny Rivero  Primary Team: Adilene Green  Registered Dietitian: Sis Junior  : Nichole Sullivan  Speech Pathology: Lupe Vera  Student: Yaneth Mao

## 2023-03-09 NOTE — PROGRESS NOTE ADULT - SUBJECTIVE AND OBJECTIVE BOX
Patient is a 72y Female whom presented to the hospital with esrd on hd     PAST MEDICAL & SURGICAL HISTORY:  HTN (hypertension)      h/o Anxiety attack      Depression      h/o Myocardial infarct 2007      h/o Hepatitis A 1969  currently resolved, no symptoms      Murmur, cardiac      h/o Smoking  quitted 3/2012      Anemia secondary to renal failure      ESRD on dialysis      Falls      Ataxia      Type 2 diabetes mellitus      Peripheral vascular disease, unspecified      CAD (coronary artery disease)      Diabetes      coronary stent 2007      s/p Ovarian cyst removal      s/p surgical removal of benign Skin lesion epigastric area      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 1000 milliLiter(s) (50 mL/Hr) IV Continuous <Continuous>  dextrose 5%. 1000 milliLiter(s) (100 mL/Hr) IV Continuous <Continuous>  dextrose 50% Injectable 25 Gram(s) IV Push once  dextrose 50% Injectable 12.5 Gram(s) IV Push once  dextrose 50% Injectable 25 Gram(s) IV Push once  glucagon  Injectable 1 milliGRAM(s) IntraMuscular once  imipramine 50 milliGRAM(s) Oral daily  insulin glargine Injectable (LANTUS) 5 Unit(s) SubCutaneous at bedtime  insulin lispro (ADMELOG) corrective regimen sliding scale   SubCutaneous three times a day before meals  insulin lispro (ADMELOG) corrective regimen sliding scale   SubCutaneous at bedtime  metoprolol tartrate 100 milliGRAM(s) Oral at bedtime  multivitamin 1 Tablet(s) Oral daily  pantoprazole    Tablet 40 milliGRAM(s) Oral before breakfast  senna 2 Tablet(s) Oral at bedtime      Allergies    latex (Hives)  No Known Drug Allergies    Intolerances        SOCIAL HISTORY:  Denies ETOh,Smoking,     FAMILY HISTORY:  FH: myocardial infarction (Father)    FH: myocardial infarction (Father)    Family history of type 2 diabetes mellitus in brother (Sibling)        REVIEW OF SYSTEMS:    CONSTITUTIONAL: No weakness, fevers or chills  RESPIRATORY: No cough, wheezing, hemoptysis; No shortness of breath  CARDIOVASCULAR: No chest pain or palpitations  GASTROINTESTINAL: No abdominal or epigastric pain. No nausea, vomiting,     No diarrhea or constipation. No melena   GENITOURINARY: No dysuria, frequency or hematuria  NEUROLOGICAL: No numbness or weakness  SKIN: dry      MEDICATIONS  (STANDING):  aspirin enteric coated 81 milliGRAM(s) Oral daily  atorvastatin 40 milliGRAM(s) Oral at bedtime  cloNIDine 0.1 milliGRAM(s) Oral two times a day  dextrose 5%. 1000 milliLiter(s) (50 mL/Hr) IV Continuous <Continuous>  dextrose 5%. 1000 milliLiter(s) (100 mL/Hr) IV Continuous <Continuous>  dextrose 50% Injectable 25 Gram(s) IV Push once  dextrose 50% Injectable 12.5 Gram(s) IV Push once  dextrose 50% Injectable 25 Gram(s) IV Push once  epoetin fawad-epbx (RETACRIT) Injectable 71307 Unit(s) IV Push <User Schedule>  glucagon  Injectable 1 milliGRAM(s) IntraMuscular once  imipramine 50 milliGRAM(s) Oral daily  insulin glargine Injectable (LANTUS) 5 Unit(s) SubCutaneous at bedtime  insulin lispro (ADMELOG) corrective regimen sliding scale   SubCutaneous every 6 hours  metoprolol tartrate 100 milliGRAM(s) Oral at bedtime  multivitamin 1 Tablet(s) Oral daily  pantoprazole    Tablet 40 milliGRAM(s) Oral before breakfast  risperiDONE   Tablet 0.5 milliGRAM(s) Oral at bedtime  senna 2 Tablet(s) Oral at bedtime  sodium zirconium cyclosilicate 10 Gram(s) Oral <User Schedule>                                            10.9   10.14 )-----------( 191      ( 09 Mar 2023 08:30 )             32.9       CBC Full  -  ( 09 Mar 2023 08:30 )  WBC Count : 10.14 K/uL  RBC Count : 3.46 M/uL  Hemoglobin : 10.9 g/dL  Hematocrit : 32.9 %  Platelet Count - Automated : 191 K/uL  Mean Cell Volume : 95.1 fl  Mean Cell Hemoglobin : 31.5 pg  Mean Cell Hemoglobin Concentration : 33.1 gm/dL  Auto Neutrophil # : x  Auto Lymphocyte # : x  Auto Monocyte # : x  Auto Eosinophil # : x  Auto Basophil # : x  Auto Neutrophil % : x  Auto Lymphocyte % : x  Auto Monocyte % : x  Auto Eosinophil % : x  Auto Basophil % : x      03-09    135  |  98  |  50<H>  ----------------------------<  185<H>  4.2   |  31  |  5.50<H>    Ca    8.9      09 Mar 2023 07:05    TPro  5.7<L>  /  Alb  2.7<L>  /  TBili  0.3  /  DBili  x   /  AST  7<L>  /  ALT  <6<L>  /  AlkPhos  91  03-08      CAPILLARY BLOOD GLUCOSE      POCT Blood Glucose.: 236 mg/dL (09 Mar 2023 17:04)  POCT Blood Glucose.: 365 mg/dL (09 Mar 2023 11:34)  POCT Blood Glucose.: 183 mg/dL (09 Mar 2023 06:25)  POCT Blood Glucose.: 232 mg/dL (09 Mar 2023 00:37)      Vital Signs Last 24 Hrs  T(C): 36.4 (09 Mar 2023 13:00), Max: 37.3 (08 Mar 2023 20:33)  T(F): 97.5 (09 Mar 2023 13:00), Max: 99.1 (08 Mar 2023 20:33)  HR: 70 (09 Mar 2023 17:32) (68 - 89)  BP: 136/74 (09 Mar 2023 17:32) (136/74 - 151/75)  BP(mean): --  RR: 18 (09 Mar 2023 13:00) (18 - 18)  SpO2: 91% (09 Mar 2023 13:00) (91% - 98%)    Parameters below as of 09 Mar 2023 13:00  Patient On (Oxygen Delivery Method): room air            PT/INR - ( 08 Mar 2023 07:33 )   PT: 11.6 sec;   INR: 0.99 ratio             PHYSICAL EXAM:    Constitutional: NAD  HEENT: conjunctive   clear   Neck:  No JVD  Respiratory: CTAB  Cardiovascular: S1 and S2  Gastrointestinal: BS+, soft, NT/ND  Extremities: No peripheral edema  Neurological: A/O x 3, no focal deficits  Psychiatric: Normal mood, normal affect  : No Renteria  Skin: No rashes  Access: pos right arm  fistula   partial ray amputation of right great toe

## 2023-03-09 NOTE — PROGRESS NOTE ADULT - PROBLEM SELECTOR PLAN 1
Seen  by vascular teem 02/24 during previous admission PAD S/P R-->L bifem-fem BK pop bypass, recent fempop bypass (6/21/2022), and partial ray amputation right great toe (6/22/2022), Further imaging /workup as per vacular team ,booked for angiogram 03/10/23-Patient is optimized from medical and cardiovascular standpoint to proceed with planned vascular interventions( angiogram/angioplasty)  with routine hemodynamic monitoring.

## 2023-03-09 NOTE — PROGRESS NOTE ADULT - SUBJECTIVE AND OBJECTIVE BOX
PROGRESS NOTE  Patient is a 72y old  Female who presents with a chief complaint of admitted for RLE angiogram (09 Mar 2023 08:25)    Chart and available morning labs /imaging are reviewed electronically , urgent issues addressed . More information  is being added upon completion of rounds , when more information is collected and management discussed with consultants , medical staff and social service/case management on the floor   OVERNIGHT      HPI:  72-year-old female with history of A-fib, diabetes, hypertension, hyperlipidemia, end-stage renal disease hemodialysis, CHF, CAD/CABG, PVD sent in from [facility] for admission for right lower extremity angiogram ,patient is scheduled by vascular team for intervention tomorrow and requires cardiology clearance ,labs ,EKG and chest xray .  Patient otherwise feeling well and has no complaints. Last dialysis - yesterday. Patient was recently hospitaliazed with malfunctioning HD fistula , nephrologist contacted . (07 Mar 2023 16:13)    PAST MEDICAL & SURGICAL HISTORY:  HTN (hypertension)      h/o Anxiety attack      Depression      h/o Myocardial infarct 2007      h/o Hepatitis A 1969  currently resolved, no symptoms      Murmur, cardiac      h/o Smoking  quitted 3/2012      Anemia secondary to renal failure      ESRD on dialysis      Falls      Ataxia      Type 2 diabetes mellitus      Peripheral vascular disease, unspecified      CAD (coronary artery disease)      Diabetes      coronary stent 2007      s/p Ovarian cyst removal      s/p surgical removal of benign Skin lesion epigastric area      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          MEDICATIONS  (STANDING):  aspirin enteric coated 81 milliGRAM(s) Oral daily  atorvastatin 40 milliGRAM(s) Oral at bedtime  ceFAZolin   IVPB 2000 milliGRAM(s) IV Intermittent once  cloNIDine 0.1 milliGRAM(s) Oral two times a day  dextrose 5%. 1000 milliLiter(s) (50 mL/Hr) IV Continuous <Continuous>  dextrose 5%. 1000 milliLiter(s) (100 mL/Hr) IV Continuous <Continuous>  dextrose 50% Injectable 25 Gram(s) IV Push once  dextrose 50% Injectable 12.5 Gram(s) IV Push once  dextrose 50% Injectable 25 Gram(s) IV Push once  epoetin fawad-epbx (RETACRIT) Injectable 16849 Unit(s) IV Push <User Schedule>  glucagon  Injectable 1 milliGRAM(s) IntraMuscular once  heparin   Injectable 5000 Unit(s) SubCutaneous every 12 hours  imipramine 50 milliGRAM(s) Oral daily  insulin glargine Injectable (LANTUS) 5 Unit(s) SubCutaneous at bedtime  insulin lispro (ADMELOG) corrective regimen sliding scale   SubCutaneous every 6 hours  metoprolol tartrate 100 milliGRAM(s) Oral at bedtime  multivitamin 1 Tablet(s) Oral daily  pantoprazole    Tablet 40 milliGRAM(s) Oral before breakfast  pantoprazole  Injectable 40 milliGRAM(s) IV Push once  risperiDONE   Tablet 0.5 milliGRAM(s) Oral at bedtime  senna 2 Tablet(s) Oral at bedtime  sodium zirconium cyclosilicate 10 Gram(s) Oral <User Schedule>    MEDICATIONS  (PRN):  acetaminophen     Tablet .. 650 milliGRAM(s) Oral every 6 hours PRN Temp greater or equal to 38C (100.4F), Mild Pain (1 - 3)  aluminum hydroxide/magnesium hydroxide/simethicone Suspension 30 milliLiter(s) Oral every 4 hours PRN Dyspepsia  dextrose Oral Gel 15 Gram(s) Oral once PRN Blood Glucose LESS THAN 70 milliGRAM(s)/deciliter  melatonin 3 milliGRAM(s) Oral at bedtime PRN Insomnia  ondansetron Injectable 4 milliGRAM(s) IV Push every 8 hours PRN Nausea and/or Vomiting  traMADol 50 milliGRAM(s) Oral four times a day PRN Moderate Pain (4 - 6)      OBJECTIVE    T(C): 36.4 (03-09-23 @ 05:10), Max: 37.3 (03-08-23 @ 20:33)  HR: 68 (03-09-23 @ 05:10) (62 - 89)  BP: 137/75 (03-09-23 @ 05:10) (125/57 - 151/75)  RR: 18 (03-09-23 @ 05:10) (18 - 18)  SpO2: 98% (03-09-23 @ 05:10) (95% - 99%)  Wt(kg): --  I&O's Summary    08 Mar 2023 07:01  -  09 Mar 2023 07:00  --------------------------------------------------------  IN: 0 mL / OUT: 1000 mL / NET: -1000 mL          REVIEW OF SYSTEMS:  CONSTITUTIONAL: No fever, weight loss, or fatigue  EYES: No eye pain, visual disturbances, or discharge  ENMT:   No sinus or throat pain  NECK: No pain or stiffness  RESPIRATORY: No cough, wheezing, chills or hemoptysis; No shortness of breath  CARDIOVASCULAR: No chest pain, palpitations, dizziness, or leg swelling  GASTROINTESTINAL: No abdominal pain. No nausea, vomiting; No diarrhea or constipation. No melena or hematochezia.  GENITOURINARY: No dysuria, frequency, hematuria, or incontinence  NEUROLOGICAL: No headaches, memory loss, loss of strength, numbness, or tremors  SKIN: No itching, burning, rashes, or lesions   MUSCULOSKELETAL: No joint pain or swelling; No muscle, back, or extremity pain    PHYSICAL EXAM:  Appearance: NAD. VS past 24 hrs -as above   HEENT:   Moist oral mucosa. Conjunctiva clear b/l.   Neck : supple  Respiratory: Lungs CTAB.  Gastrointestinal:  Soft, nontender. No rebound. No rigidity. BS present	  Cardiovascular: RRR ,S1S2 present  Neurologic: Non-focal. Moving all extremities.  Extremities: No edema. No erythema. No calf tenderness.  Skin: No rashes, No ecchymoses, No cyanosis.	  wounds ,skin lesions-See skin assesment flow sheet   LABS:                        9.6    6.73  )-----------( 159      ( 08 Mar 2023 07:33 )             29.2     03-09    135  |  98  |  50<H>  ----------------------------<  185<H>  4.2   |  31  |  5.50<H>    Ca    8.9      09 Mar 2023 07:05    TPro  5.7<L>  /  Alb  2.7<L>  /  TBili  0.3  /  DBili  x   /  AST  7<L>  /  ALT  <6<L>  /  AlkPhos  91  03-08    CAPILLARY BLOOD GLUCOSE      POCT Blood Glucose.: 183 mg/dL (09 Mar 2023 06:25)  POCT Blood Glucose.: 232 mg/dL (09 Mar 2023 00:37)  POCT Blood Glucose.: 324 mg/dL (08 Mar 2023 18:41)  POCT Blood Glucose.: 109 mg/dL (08 Mar 2023 11:45)    PT/INR - ( 08 Mar 2023 07:33 )   PT: 11.6 sec;   INR: 0.99 ratio         PTT - ( 07 Mar 2023 15:00 )  PTT:26.6 sec      RADIOLOGY & ADDITIONAL TESTS:   reviewed elctronically  ASSESSMENT/PLAN: 	     PROGRESS NOTE  Patient is a 72y old  Female who presents with a chief complaint of admitted for RLE angiogram (09 Mar 2023 08:25)    Chart and available morning labs /imaging are reviewed electronically , urgent issues addressed . More information  is being added upon completion of rounds , when more information is collected and management discussed with consultants , medical staff and social service/case management on the floor   OVERNIGHT  No new issues reported by medical staff . All above noted Patient is resting in a bed comfortably .Confused ,poor mentation .No distress noted   Angiogram is booked for tomorrow Patient denies complains   HPI:  72-year-old female with history of A-fib, diabetes, hypertension, hyperlipidemia, end-stage renal disease hemodialysis, CHF, CAD/CABG, PVD sent in from [facility] for admission for right lower extremity angiogram ,patient is scheduled by vascular team for intervention tomorrow and requires cardiology clearance ,labs ,EKG and chest xray .  Patient otherwise feeling well and has no complaints. Last dialysis - yesterday. Patient was recently hospitaliazed with malfunctioning HD fistula , nephrologist contacted . (07 Mar 2023 16:13)    PAST MEDICAL & SURGICAL HISTORY:  HTN (hypertension)      h/o Anxiety attack      Depression      h/o Myocardial infarct 2007      h/o Hepatitis A 1969  currently resolved, no symptoms      Murmur, cardiac      h/o Smoking  quitted 3/2012      Anemia secondary to renal failure      ESRD on dialysis      Falls      Ataxia      Type 2 diabetes mellitus      Peripheral vascular disease, unspecified      CAD (coronary artery disease)      Diabetes      coronary stent 2007      s/p Ovarian cyst removal      s/p surgical removal of benign Skin lesion epigastric area      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          MEDICATIONS  (STANDING):  aspirin enteric coated 81 milliGRAM(s) Oral daily  atorvastatin 40 milliGRAM(s) Oral at bedtime  ceFAZolin   IVPB 2000 milliGRAM(s) IV Intermittent once  cloNIDine 0.1 milliGRAM(s) Oral two times a day  dextrose 5%. 1000 milliLiter(s) (50 mL/Hr) IV Continuous <Continuous>  dextrose 5%. 1000 milliLiter(s) (100 mL/Hr) IV Continuous <Continuous>  dextrose 50% Injectable 25 Gram(s) IV Push once  dextrose 50% Injectable 12.5 Gram(s) IV Push once  dextrose 50% Injectable 25 Gram(s) IV Push once  epoetin fwaad-epbx (RETACRIT) Injectable 58231 Unit(s) IV Push <User Schedule>  glucagon  Injectable 1 milliGRAM(s) IntraMuscular once  heparin   Injectable 5000 Unit(s) SubCutaneous every 12 hours  imipramine 50 milliGRAM(s) Oral daily  insulin glargine Injectable (LANTUS) 5 Unit(s) SubCutaneous at bedtime  insulin lispro (ADMELOG) corrective regimen sliding scale   SubCutaneous every 6 hours  metoprolol tartrate 100 milliGRAM(s) Oral at bedtime  multivitamin 1 Tablet(s) Oral daily  pantoprazole    Tablet 40 milliGRAM(s) Oral before breakfast  pantoprazole  Injectable 40 milliGRAM(s) IV Push once  risperiDONE   Tablet 0.5 milliGRAM(s) Oral at bedtime  senna 2 Tablet(s) Oral at bedtime  sodium zirconium cyclosilicate 10 Gram(s) Oral <User Schedule>    MEDICATIONS  (PRN):  acetaminophen     Tablet .. 650 milliGRAM(s) Oral every 6 hours PRN Temp greater or equal to 38C (100.4F), Mild Pain (1 - 3)  aluminum hydroxide/magnesium hydroxide/simethicone Suspension 30 milliLiter(s) Oral every 4 hours PRN Dyspepsia  dextrose Oral Gel 15 Gram(s) Oral once PRN Blood Glucose LESS THAN 70 milliGRAM(s)/deciliter  melatonin 3 milliGRAM(s) Oral at bedtime PRN Insomnia  ondansetron Injectable 4 milliGRAM(s) IV Push every 8 hours PRN Nausea and/or Vomiting  traMADol 50 milliGRAM(s) Oral four times a day PRN Moderate Pain (4 - 6)      OBJECTIVE    T(C): 36.4 (03-09-23 @ 05:10), Max: 37.3 (03-08-23 @ 20:33)  HR: 68 (03-09-23 @ 05:10) (62 - 89)  BP: 137/75 (03-09-23 @ 05:10) (125/57 - 151/75)  RR: 18 (03-09-23 @ 05:10) (18 - 18)  SpO2: 98% (03-09-23 @ 05:10) (95% - 99%)  Wt(kg): --  I&O's Summary    08 Mar 2023 07:01  -  09 Mar 2023 07:00  --------------------------------------------------------  IN: 0 mL / OUT: 1000 mL / NET: -1000 mL          REVIEW OF SYSTEMS:  A 10-point review of systems was obtained.   Review of systems otherwise unchanged compared to prior visit except as previously noted   PHYSICAL EXAM:  Appearance: NAD. VS past 24 hrs -as above   HEENT:   Moist oral mucosa. Conjunctiva clear b/l.   Neck : supple  Respiratory: Lungs CTAB.  Gastrointestinal:  Soft, nontender. No rebound. No rigidity. BS present	  Cardiovascular: RRR ,S1S2 present  Neurologic: Non-focal. Moving all extremities.  Extremities: No edema. No erythema. No calf tenderness.  Skin: No rashes, No ecchymoses, No cyanosis.	  wounds ,skin lesions-See skin assesment flow sheet   LABS:                        9.6    6.73  )-----------( 159      ( 08 Mar 2023 07:33 )             29.2     03-09    135  |  98  |  50<H>  ----------------------------<  185<H>  4.2   |  31  |  5.50<H>    Ca    8.9      09 Mar 2023 07:05    TPro  5.7<L>  /  Alb  2.7<L>  /  TBili  0.3  /  DBili  x   /  AST  7<L>  /  ALT  <6<L>  /  AlkPhos  91  03-08    CAPILLARY BLOOD GLUCOSE      POCT Blood Glucose.: 183 mg/dL (09 Mar 2023 06:25)  POCT Blood Glucose.: 232 mg/dL (09 Mar 2023 00:37)  POCT Blood Glucose.: 324 mg/dL (08 Mar 2023 18:41)  POCT Blood Glucose.: 109 mg/dL (08 Mar 2023 11:45)    PT/INR - ( 08 Mar 2023 07:33 )   PT: 11.6 sec;   INR: 0.99 ratio         PTT - ( 07 Mar 2023 15:00 )  PTT:26.6 sec      RADIOLOGY & ADDITIONAL TESTS:   reviewed elctronically  ASSESSMENT/PLAN: 	    25 minutes aggregate time was spent on this visit, 50% visit time spent in care co-ordination with other attendings and counselling patient .I have discussed care plan with patient / HCP/family member ,who expressed understanding of problems treatment and their effect and side effects, alternatives in details. I have asked if they have any questions and concerns and appropriately addressed them to best of my ability.

## 2023-03-09 NOTE — PROGRESS NOTE ADULT - SUBJECTIVE AND OBJECTIVE BOX
Patient is a 72y Female with a known history of :  PAD (peripheral artery disease) [I73.9]    HTN (hypertension) [I10]    DM2 (diabetes mellitus, type 2) [E11.9]    ESRD on hemodialysis [N18.6]    Afib [I48.91]    CAD (coronary artery disease) [I25.10]    Prophylactic measure [Z29.9]    MDD (major depressive disorder) [F32.9]    Ataxia [R27.0]    Hyperkalemia [E87.5]      HPI:  72-year-old female with history of A-fib, diabetes, hypertension, hyperlipidemia, end-stage renal disease hemodialysis, CHF, CAD/CABG, PVD sent in from [facility] for admission for right lower extremity angiogram ,patient is scheduled by vascular team for intervention tomorrow and requires cardiology clearance ,labs ,EKG and chest xray .  Patient otherwise feeling well and has no complaints. Last dialysis - yesterday. Patient was recently hospitaliazed with malfunctioning HD fistula , nephrologist contacted . (07 Mar 2023 16:13)      REVIEW OF SYSTEMS:    CONSTITUTIONAL: No fever, weight loss, or fatigue  EYES: No eye pain, visual disturbances, or discharge  ENMT:  No difficulty hearing, tinnitus, vertigo; No sinus or throat pain  NECK: No pain or stiffness  BREASTS: No pain, masses, or nipple discharge  RESPIRATORY: No cough, wheezing, chills or hemoptysis; No shortness of breath  CARDIOVASCULAR: No chest pain, palpitations, dizziness, or leg swelling  GASTROINTESTINAL: No abdominal or epigastric pain. No nausea, vomiting, or hematemesis; No diarrhea or constipation. No melena or hematochezia.  GENITOURINARY: No dysuria, frequency, hematuria, or incontinence  NEUROLOGICAL: No headaches, memory loss, loss of strength, numbness, or tremors  SKIN: No itching, burning, rashes, or lesions   LYMPH NODES: No enlarged glands  ENDOCRINE: No heat or cold intolerance; No hair loss  MUSCULOSKELETAL: No joint pain or swelling; No muscle, back, or extremity pain  PSYCHIATRIC: No depression, anxiety, mood swings, or difficulty sleeping  HEME/LYMPH: No easy bruising, or bleeding gums  ALLERGY AND IMMUNOLOGIC: No hives or eczema    MEDICATIONS  (STANDING):  aspirin enteric coated 81 milliGRAM(s) Oral daily  atorvastatin 40 milliGRAM(s) Oral at bedtime  ceFAZolin   IVPB 2000 milliGRAM(s) IV Intermittent once  cloNIDine 0.1 milliGRAM(s) Oral two times a day  dextrose 5%. 1000 milliLiter(s) (50 mL/Hr) IV Continuous <Continuous>  dextrose 5%. 1000 milliLiter(s) (100 mL/Hr) IV Continuous <Continuous>  dextrose 50% Injectable 25 Gram(s) IV Push once  dextrose 50% Injectable 12.5 Gram(s) IV Push once  dextrose 50% Injectable 25 Gram(s) IV Push once  epoetin fawad-epbx (RETACRIT) Injectable 56672 Unit(s) IV Push <User Schedule>  glucagon  Injectable 1 milliGRAM(s) IntraMuscular once  heparin   Injectable 5000 Unit(s) SubCutaneous every 12 hours  imipramine 50 milliGRAM(s) Oral daily  insulin glargine Injectable (LANTUS) 5 Unit(s) SubCutaneous at bedtime  insulin lispro (ADMELOG) corrective regimen sliding scale   SubCutaneous every 6 hours  metoprolol tartrate 100 milliGRAM(s) Oral at bedtime  multivitamin 1 Tablet(s) Oral daily  pantoprazole    Tablet 40 milliGRAM(s) Oral before breakfast  pantoprazole  Injectable 40 milliGRAM(s) IV Push once  risperiDONE   Tablet 0.5 milliGRAM(s) Oral at bedtime  senna 2 Tablet(s) Oral at bedtime  sodium zirconium cyclosilicate 10 Gram(s) Oral <User Schedule>    MEDICATIONS  (PRN):  acetaminophen     Tablet .. 650 milliGRAM(s) Oral every 6 hours PRN Temp greater or equal to 38C (100.4F), Mild Pain (1 - 3)  aluminum hydroxide/magnesium hydroxide/simethicone Suspension 30 milliLiter(s) Oral every 4 hours PRN Dyspepsia  dextrose Oral Gel 15 Gram(s) Oral once PRN Blood Glucose LESS THAN 70 milliGRAM(s)/deciliter  melatonin 3 milliGRAM(s) Oral at bedtime PRN Insomnia  ondansetron Injectable 4 milliGRAM(s) IV Push every 8 hours PRN Nausea and/or Vomiting  traMADol 50 milliGRAM(s) Oral four times a day PRN Moderate Pain (4 - 6)      ALLERGIES: latex (Hives)  No Known Drug Allergies      FAMILY HISTORY:  FH: myocardial infarction (Father)    FH: myocardial infarction (Father)    Family history of type 2 diabetes mellitus in brother (Sibling)        PHYSICAL EXAMINATION:  -----------------------------  T(C): 36.4 (03-09-23 @ 05:10), Max: 37.3 (03-08-23 @ 20:33)  HR: 68 (03-09-23 @ 05:10) (62 - 89)  BP: 137/75 (03-09-23 @ 05:10) (125/57 - 151/75)  RR: 18 (03-09-23 @ 05:10) (18 - 18)  SpO2: 98% (03-09-23 @ 05:10) (95% - 99%)  Wt(kg): --    03-08 @ 07:01  -  03-09 @ 07:00  --------------------------------------------------------  IN:  Total IN: 0 mL    OUT:    Other (mL): 1000 mL  Total OUT: 1000 mL    Total NET: -1000 mL            VITALS  T(C): 36.4 (03-09-23 @ 05:10), Max: 37.3 (03-08-23 @ 20:33)  HR: 68 (03-09-23 @ 05:10) (62 - 89)  BP: 137/75 (03-09-23 @ 05:10) (125/57 - 151/75)  RR: 18 (03-09-23 @ 05:10) (18 - 18)  SpO2: 98% (03-09-23 @ 05:10) (95% - 99%)    Constitutional: well developed, normal appearance, well groomed, well nourished, no deformities and no acute distress.   Eyes: the conjunctiva exhibited no abnormalities and the eyelids demonstrated no xanthelasmas.   HEENT: normal oral mucosa, no oral pallor and no oral cyanosis.   Neck: normal jugular venous A waves present, normal jugular venous V waves present and no jugular venous wilson A waves.   Pulmonary: no respiratory distress, normal respiratory rhythm and effort, no accessory muscle use and lungs were clear to auscultation bilaterally.   Cardiovascular: heart rate and rhythm were normal, normal S1 and S2 and no murmur, gallop, rub, heave or thrill are present.   Abdomen: soft, non-tender, no hepato-splenomegaly and no abdominal mass palpated.   Musculoskeletal: the gait could not be assessed..   Extremities: no clubbing of the fingernails, no localized cyanosis, no petechial hemorrhages and no ischemic changes.   Skin: normal skin color and pigmentation, no rash, no venous stasis, no skin lesions, no skin ulcer and no xanthoma was observed.   Psychiatric: oriented to person, place, and time, the affect was normal, the mood was normal and not feeling anxious.     LABS:   --------  03-09    135  |  98  |  50<H>  ----------------------------<  185<H>  4.2   |  31  |  5.50<H>    Ca    8.9      09 Mar 2023 07:05    TPro  5.7<L>  /  Alb  2.7<L>  /  TBili  0.3  /  DBili  x   /  AST  7<L>  /  ALT  <6<L>  /  AlkPhos  91  03-08                         9.6    6.73  )-----------( 159      ( 08 Mar 2023 07:33 )             29.2     PT/INR - ( 08 Mar 2023 07:33 )   PT: 11.6 sec;   INR: 0.99 ratio         PTT - ( 07 Mar 2023 15:00 )  PTT:26.6 sec            RADIOLOGY:  -----------------    ECG:     ECHO:

## 2023-03-09 NOTE — CARE COORDINATION ASSESSMENT. - OTHER PERTINENT DISCHARGE PLANNING INFORMATION:
Office Consent to Operation/Invasive Procedure    Name: Aaliyah Lizama   YOB: 1955   MRN: [id number]   1. AUTHORIZATION:  I authorize performance on the patient of the following surgical operation/invasive procedure under the direction, supervision and authority of Alina Mayfield MD.  Description of operation(s):     2. NATURE OF TREATMENT:  The nature of my condition, the nature and purpose of the operation/procedure, possible alternative methods of treatment, risks involved, and the possibility of complications have been explained to me. I have had an opportunity to discuss this operation/procedure with the physician and other doctors concerned, and I have received answers to all questions I asked and do hereby assume all risks involved. No guarantee or assurance has been given to me by anyone as to the results that may be obtained.     3. ADDITIONAL PROCEDURES:  I consent to the performance of operations and procedures in addition to or different from those now contemplated, whether or not arising from presently unforeseen conditions, which the above-named doctor or his/her associate or assistants may consider necessary or advisable in the course of the operation.    4. OTHER QUALIFIED PRACTITIONERS:  I have been advised, and I agree, that the operation/procedure may be performed by a team of doctors including one or more attending, doctors, residents and medical students. I consent to these other qualified medical practitioners, who are not physicians, performing important parts of the operation/procedure or the administration of anesthetic agents.    5. ANESTHETIC AGENTS: I understand that anesthetic agents may have serious and even fatal complications. I consent to the administration of such anesthetics/sedatives/medications as may be considered necessary or advisable by the physician responsible for the service.      6. OBSERVATION:  For the purpose of advancing the educational programs of the  facility I consent to the admittance of qualified observers.    7. PHOTOGRAPHY: I consent to the taking and publication of photographs and other reproductions in the course of the operation or procedure for the purpose of advancing education provided that the identify of the patient is not revealed.    8. TISSUE USE:  I authorize the facility to preserve and use for scientific or teaching purposes or otherwise dispose of any dismembered tissue resulting from the procedure and treatment authorized above.    9. INDEPENDENT PHYSICIAN SERVICES: I have been informed and I acknowledge and fully understand that the Physician(s) providing services to me in this facility, such as my personal Physician(s),  Specialty Physician(s), Radiology, Pathology, consulting, and other allied health physicians, are independent contractors and are not employees or agents of this facility, unless otherwise specifically stated. My decision to seek medical care at the facility is not based upon any understanding, representation, advertisement, media campaign, inference, implication or reliance that the physicians who are or will be treating me are employees or agents of the facility.     DO NOT SIGN IF YOU HAVE ANY QUESTIONS  ________________________________________________________________________________________________________________  My signature below constitutes my acknowledgement and agreement that I have read and understand the above, was given an opportunity to discuss this form and ask questions, that all questions were answered to my satisfaction, and I am satisfied I understand the form's contents and significance.    Date: __________________ Time: _________________ Signed: ______________________________________________________        Patient/Legally Authorized Representative ([employer] appropriate)     Date: __________________   Time: _________________   Signed: ______________________________________________________  Witness    Certificate of Interpretation:  I certify that I have read the foregoing to the signor hearof in the ____________________________________________________ language.     Date: ________________ Time: __________________ Signed: _________________________________________________________     Affirmation by Responsible Physician  I have informed the above named patient or Legally Authorized Representative of the medical condition requiring surgical treatment and/or the further diagnostic procedures referred to above. I have explained, consistent with accepted medical judgment, the nature and purposes of the treatment or procedures, the reasonable (1) possible alternatives (2) risks/complications and (3) consequences in the treatment or procedure consented to. I have also given the opportunity to ask questions and have answered any such questions.     Date:________________ Time: __________________ Signed: _________________________________________________________      TORIN met with this pt at bedside- pt admitted from [facility]. Pt lethargic, responded to her name but did nto answer any other questions. Message left for pts spouse, called pts son who answered. SW discussed name role elos and dc planning. PT has been at [facility] since august, son happy for her to return on DC. PT also gets dialysis bedside at [facility] 3x a week. SW to follow for safe dc and emotional support.

## 2023-03-09 NOTE — SWALLOW BEDSIDE ASSESSMENT ADULT - ASR SWALLOW RECOMMEND DIAG
Objective testing is NOT indicated given functional swallow for regular solids and thin liquids and clear chest imaging

## 2023-03-09 NOTE — PHYSICAL THERAPY INITIAL EVALUATION ADULT - ADDITIONAL COMMENTS
Patient is a long term resident and reports she primarily uses wheelchair for locomotion, but is able to ambulate with RW with assist, and requires assistance with ADLs

## 2023-03-09 NOTE — CARE COORDINATION ASSESSMENT. - NSPASTMEDSURGHISTORY_GEN_ALL_CORE_FT
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

## 2023-03-09 NOTE — SWALLOW BEDSIDE ASSESSMENT ADULT - COMMENTS
MD orders received. Chart reviewed. Second attempt made to complete clinical swallow evaluation this PM at which time pt remains off unit at dialysis. This dept to continue to f/u as schedule permits for clinical assessment.
Order received for swallow evaluation. SLP arrived to room and RN reporting patient is at dialysis and will be back in a few hours. Will follow up to reattempt swallow evaluation.
As per Hospitalist note dated 3/9/23, "72-year-old female with history of A-fib, diabetes, hypertension, hyperlipidemia, end-stage renal disease hemodialysis, CHF, CAD/CABG, PVD sent in from [facility] for admission for right lower extremity angiogram ,patient is scheduled by vascular team for intervention tomorrow and requires cardiology clearance ,labs ,EKG and chest xray .  Patient otherwise feeling well and has no complaints. Last dialysis - yesterday. Patient was recently hospitaliazed with malfunctioning HD fistula , nephrologist contacted."     CXR 3/7/23 "IMPRESSION: No acute finding or change."    As per Dietary note dated 3/8/23, "Pt admitted from [facility]. Reviewed transfer documents, pt was on regular consistency renal, NCS diet."    Patient visited at bedside for clinical swallow evaluation. Per RN, patient requesting regular consistency diet. Patient presents as awake and alert, sitting edge of bed. Patient reports eating regular solids at baseline. Patient demonstrates the ability to follow 1-step directions and make basic wants/needs known.

## 2023-03-09 NOTE — PHYSICAL THERAPY INITIAL EVALUATION ADULT - GAIT TRAINING, PT EVAL
Patient will ambulate x 50 feet with RW with min A x 1 in 2 weeks in order to increase mobility at SNF

## 2023-03-09 NOTE — PHYSICAL THERAPY INITIAL EVALUATION ADULT - BED MOBILITY TRAINING, PT EVAL
Patient will perform all bed mobility with supervision in 2 weeks in order to increase mobility at SNF

## 2023-03-09 NOTE — PHYSICAL THERAPY INITIAL EVALUATION ADULT - PERTINENT HX OF CURRENT PROBLEM, REHAB EVAL
72-year-old female with history of A-fib, diabetes, hypertension, hyperlipidemia, end-stage renal disease hemodialysis, CHF, CAD/CABG, PVD sent in from [facility] for admission for right lower extremity angiogram ,patient is scheduled by vascular team for intervention tomorrow and requires cardiology clearance ,labs ,EKG and chest xray .  Patient otherwise feeling well and has no complaints. Last dialysis - yesterday. Patient was recently hospitaliazed with malfunctioning HD fistula , nephrologist contacted

## 2023-03-09 NOTE — ED ADULT NURSE NOTE - NS_ED_NURSE_TEACHING_TOPIC_ED_A_ED
fall Purse String (Simple) Text: Given the location of the defect and the characteristics of the surrounding skin a purse string closure was deemed most appropriate.  Undermining was performed circumferentially around the surgical defect.  A purse string suture was then placed and tightened.

## 2023-03-09 NOTE — SWALLOW BEDSIDE ASSESSMENT ADULT - ADDITIONAL RECOMMENDATIONS
2. This service to follow-up as schedule permits for diet tolerance. 3. Medical team further advised to reconsult this department with any change in medical status and/or observed change in tolerance of recommended PO diet.

## 2023-03-09 NOTE — PROGRESS NOTE ADULT - SUBJECTIVE AND OBJECTIVE BOX
Patient is a 72y old  Female who presents with a chief complaint of admitted for RLE angiogram (09 Mar 2023 10:05)  Pt without c/o pain at this time  No acute events overnight    MEDICATIONS  (STANDING):  aspirin enteric coated 81 milliGRAM(s) Oral daily  atorvastatin 40 milliGRAM(s) Oral at bedtime  ceFAZolin   IVPB 2000 milliGRAM(s) IV Intermittent once  cloNIDine 0.1 milliGRAM(s) Oral two times a day  dextrose 5%. 1000 milliLiter(s) (50 mL/Hr) IV Continuous <Continuous>  dextrose 5%. 1000 milliLiter(s) (100 mL/Hr) IV Continuous <Continuous>  dextrose 50% Injectable 25 Gram(s) IV Push once  dextrose 50% Injectable 12.5 Gram(s) IV Push once  dextrose 50% Injectable 25 Gram(s) IV Push once  epoetin fawad-epbx (RETACRIT) Injectable 20054 Unit(s) IV Push <User Schedule>  glucagon  Injectable 1 milliGRAM(s) IntraMuscular once  heparin   Injectable 5000 Unit(s) SubCutaneous every 12 hours  imipramine 50 milliGRAM(s) Oral daily  insulin glargine Injectable (LANTUS) 5 Unit(s) SubCutaneous at bedtime  insulin lispro (ADMELOG) corrective regimen sliding scale   SubCutaneous every 6 hours  metoprolol tartrate 100 milliGRAM(s) Oral at bedtime  multivitamin 1 Tablet(s) Oral daily  pantoprazole    Tablet 40 milliGRAM(s) Oral before breakfast  pantoprazole  Injectable 40 milliGRAM(s) IV Push once  risperiDONE   Tablet 0.5 milliGRAM(s) Oral at bedtime  senna 2 Tablet(s) Oral at bedtime  sodium zirconium cyclosilicate 10 Gram(s) Oral <User Schedule>    MEDICATIONS  (PRN):  acetaminophen     Tablet .. 650 milliGRAM(s) Oral every 6 hours PRN Temp greater or equal to 38C (100.4F), Mild Pain (1 - 3)  aluminum hydroxide/magnesium hydroxide/simethicone Suspension 30 milliLiter(s) Oral every 4 hours PRN Dyspepsia  dextrose Oral Gel 15 Gram(s) Oral once PRN Blood Glucose LESS THAN 70 milliGRAM(s)/deciliter  melatonin 3 milliGRAM(s) Oral at bedtime PRN Insomnia  ondansetron Injectable 4 milliGRAM(s) IV Push every 8 hours PRN Nausea and/or Vomiting  traMADol 50 milliGRAM(s) Oral four times a day PRN Moderate Pain (4 - 6)    Allergies  latex (Hives)  No Known Drug Allergies    Vital Signs Last 24 Hrs  T(C): 36.4 (09 Mar 2023 05:10), Max: 37.3 (08 Mar 2023 20:33)  T(F): 97.5 (09 Mar 2023 05:10), Max: 99.1 (08 Mar 2023 20:33)  HR: 68 (09 Mar 2023 05:10) (68 - 89)  BP: 137/75 (09 Mar 2023 05:10) (125/57 - 151/75)  BP(mean): --  RR: 18 (09 Mar 2023 05:10) (18 - 18)  SpO2: 98% (09 Mar 2023 05:10) (95% - 99%)    Parameters below as of 09 Mar 2023 05:10  Patient On (Oxygen Delivery Method): room air      I&O's Detail    08 Mar 2023 07:01  -  09 Mar 2023 07:00  --------------------------------------------------------  IN:  Total IN: 0 mL    OUT:    Other (mL): 1000 mL  Total OUT: 1000 mL    Total NET: -1000 mL    Physical Exam:  General: NAD, resting comfortably in bed  Pulmonary: Nonlabored breathing, no respiratory distress, diminished at bases  Cardiovascular: Normal S1, S2  Abdominal: soft, NT/ND  Extremities: R hallux amp site with dry eschar, no periwound erythema or edema; 2nd R toe distal tip with dry eschar and mild periwound erythema  Pulses:   Right:                                                                            Left:  FEM [x ]2+ [ ]1+ [ ]doppler                                             FEM [x ]2+ [ ]1+ [ ]doppler    POP [ ]2+ [ ]1+ [ ]doppler                                               POP [ ]2+ [ ]1+ [ ]doppler    DP [ ]2+ [ ]1+ [x ]doppler                                                DP [ ]2+ [ ]1+ [ ]doppler  PT[ ]2+ [ ]1+ [ ]doppler                                                  PT [ ]2+ [ ]1+ [ ]doppler      LABS:                        10.9   10.14 )-----------( 191      ( 09 Mar 2023 08:30 )             32.9     03-09    135  |  98  |  50<H>  ----------------------------<  185<H>  4.2   |  31  |  5.50<H>    Ca    8.9      09 Mar 2023 07:05    TPro  5.7<L>  /  Alb  2.7<L>  /  TBili  0.3  /  DBili  x   /  AST  7<L>  /  ALT  <6<L>  /  AlkPhos  91  03-08    PT/INR - ( 08 Mar 2023 07:33 )   PT: 11.6 sec;   INR: 0.99 ratio    PTT - ( 07 Mar 2023 15:00 )  PTT:26.6 sec    CAPILLARY BLOOD GLUCOSE  POCT Blood Glucose.: 183 mg/dL (09 Mar 2023 06:25)  POCT Blood Glucose.: 232 mg/dL (09 Mar 2023 00:37)  POCT Blood Glucose.: 324 mg/dL (08 Mar 2023 18:41)  POCT Blood Glucose.: 109 mg/dL (08 Mar 2023 11:45)    RECENT CULTURES:        Radiology and Additional Studies:

## 2023-03-09 NOTE — PROGRESS NOTE ADULT - SUBJECTIVE AND OBJECTIVE BOX
SHILO KOHLER    PLV 1EAS 114 D1    Allergies    latex (Hives)  No Known Drug Allergies    Intolerances        PAST MEDICAL & SURGICAL HISTORY:  HTN (hypertension)      h/o Anxiety attack      Depression      h/o Myocardial infarct 2007      h/o Hepatitis A 1969  currently resolved, no symptoms      Murmur, cardiac      h/o Smoking  quitted 3/2012      Anemia secondary to renal failure      ESRD on dialysis      Falls      Ataxia      Type 2 diabetes mellitus      Peripheral vascular disease, unspecified      CAD (coronary artery disease)      Diabetes      coronary stent 2007      s/p Ovarian cyst removal      s/p surgical removal of benign Skin lesion epigastric area      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          FAMILY HISTORY:  FH: myocardial infarction (Father)    FH: myocardial infarction (Father)    Family history of type 2 diabetes mellitus in brother (Sibling)        Home Medications:  acetaminophen 325 mg oral tablet: 2 tab(s) orally every 6 hours, As needed, Temp greater or equal to 38C (100.4F), Mild Pain (1 - 3) (08 Mar 2023 12:30)  Admelog SoloStar 100 units/mL injectable solution: inject subcutaneous route 3 times a day per sliding scale  (08 Mar 2023 12:30)  aspirin 81 mg oral delayed release tablet: 1 tab(s) orally once a day (at bedtime) (08 Mar 2023 12:30)  atorvastatin 40 mg oral tablet: 1 tab(s) orally once a day (at bedtime) (08 Mar 2023 12:30)  cloNIDine 0.1 mg oral tablet: 1 tab(s) orally 2 times a day (08 Mar 2023 12:30)  imipramine 50 mg oral tablet: 1 tab(s) orally once a day (08 Mar 2023 12:30)  Lantus Solostar Pen 100 units/mL subcutaneous solution: 20 unit(s) subcutaneous once a day (at bedtime) (08 Mar 2023 12:30)  Lokelma 10 g oral powder for reconstitution: 10 gram(s) orally 2 times a week on Wed and Sat  (08 Mar 2023 12:30)  Melatonin 3 mg oral tablet: 1 tab(s) orally once a day (at bedtime), As Needed (08 Mar 2023 12:30)  metoprolol tartrate 100 mg oral tablet: 1 tab(s) orally once a day (at bedtime) (08 Mar 2023 12:30)  Nephro-Alfie oral tablet: 1 tab(s) orally once a day (08 Mar 2023 12:30)  PANTOPRAZOLE 40MG DR TAB: 1 tab(s) orally once a day (08 Mar 2023 12:30)  risperiDONE 0.5 mg oral tablet: 1 tab(s) orally once a day (at bedtime) (08 Mar 2023 12:30)  Senna 8.6 mg oral tablet: 1 tab(s) orally once a day (at bedtime) (08 Mar 2023 12:30)  silver sulfADIAZINE 1% topical cream: Apply topically to affected area once a day (08 Mar 2023 12:30)      MEDICATIONS  (STANDING):  aspirin enteric coated 81 milliGRAM(s) Oral daily  atorvastatin 40 milliGRAM(s) Oral at bedtime  ceFAZolin   IVPB 2000 milliGRAM(s) IV Intermittent once  cloNIDine 0.1 milliGRAM(s) Oral two times a day  dextrose 5%. 1000 milliLiter(s) (50 mL/Hr) IV Continuous <Continuous>  dextrose 5%. 1000 milliLiter(s) (100 mL/Hr) IV Continuous <Continuous>  dextrose 50% Injectable 25 Gram(s) IV Push once  dextrose 50% Injectable 12.5 Gram(s) IV Push once  dextrose 50% Injectable 25 Gram(s) IV Push once  epoetin fawad-epbx (RETACRIT) Injectable 21934 Unit(s) IV Push <User Schedule>  glucagon  Injectable 1 milliGRAM(s) IntraMuscular once  heparin   Injectable 5000 Unit(s) SubCutaneous every 12 hours  imipramine 50 milliGRAM(s) Oral daily  insulin glargine Injectable (LANTUS) 5 Unit(s) SubCutaneous at bedtime  insulin lispro (ADMELOG) corrective regimen sliding scale   SubCutaneous every 6 hours  metoprolol tartrate 100 milliGRAM(s) Oral at bedtime  multivitamin 1 Tablet(s) Oral daily  pantoprazole    Tablet 40 milliGRAM(s) Oral before breakfast  pantoprazole  Injectable 40 milliGRAM(s) IV Push once  risperiDONE   Tablet 0.5 milliGRAM(s) Oral at bedtime  senna 2 Tablet(s) Oral at bedtime  sodium zirconium cyclosilicate 10 Gram(s) Oral <User Schedule>    MEDICATIONS  (PRN):  acetaminophen     Tablet .. 650 milliGRAM(s) Oral every 6 hours PRN Temp greater or equal to 38C (100.4F), Mild Pain (1 - 3)  aluminum hydroxide/magnesium hydroxide/simethicone Suspension 30 milliLiter(s) Oral every 4 hours PRN Dyspepsia  dextrose Oral Gel 15 Gram(s) Oral once PRN Blood Glucose LESS THAN 70 milliGRAM(s)/deciliter  melatonin 3 milliGRAM(s) Oral at bedtime PRN Insomnia  ondansetron Injectable 4 milliGRAM(s) IV Push every 8 hours PRN Nausea and/or Vomiting  traMADol 50 milliGRAM(s) Oral four times a day PRN Moderate Pain (4 - 6)      Diet, NPO after Midnight:      NPO Start Date: 09-Mar-2023,   NPO Start Time: 23:59  Except Medications (03-09-23 @ 07:35) [Active]  Diet, Consistent Carbohydrate Renal w/Evening Snack:   Soft and Bite Sized (SOFTBTSZ)  1500mL Fluid Restriction ([id number])  For patients receiving Renal Replacement - No Protein Restr, No Conc K, No Conc Phos, Low Sodium (RENAL)  Supplement Feeding Modality:  Oral  Nepro Cans or Servings Per Day:  1       Frequency:  Two Times a day (03-08-23 @ 11:35) [Active]          Vital Signs Last 24 Hrs  T(C): 36.4 (09 Mar 2023 05:10), Max: 37.3 (08 Mar 2023 20:33)  T(F): 97.5 (09 Mar 2023 05:10), Max: 99.1 (08 Mar 2023 20:33)  HR: 68 (09 Mar 2023 05:10) (62 - 89)  BP: 137/75 (09 Mar 2023 05:10) (125/57 - 151/75)  BP(mean): --  RR: 18 (09 Mar 2023 05:10) (18 - 18)  SpO2: 98% (09 Mar 2023 05:10) (95% - 99%)    Parameters below as of 09 Mar 2023 05:10  Patient On (Oxygen Delivery Method): room air          03-08-23 @ 07:01  -  03-09-23 @ 07:00  --------------------------------------------------------  IN: 0 mL / OUT: 1000 mL / NET: -1000 mL              LABS:                        10.9   10.14 )-----------( 191      ( 09 Mar 2023 08:30 )             32.9     03-09    135  |  98  |  50<H>  ----------------------------<  185<H>  4.2   |  31  |  5.50<H>    Ca    8.9      09 Mar 2023 07:05    TPro  5.7<L>  /  Alb  2.7<L>  /  TBili  0.3  /  DBili  x   /  AST  7<L>  /  ALT  <6<L>  /  AlkPhos  91  03-08    PT/INR - ( 08 Mar 2023 07:33 )   PT: 11.6 sec;   INR: 0.99 ratio         PTT - ( 07 Mar 2023 15:00 )  PTT:26.6 sec          WBC:  WBC Count: 10.14 K/uL (03-09 @ 08:30)  WBC Count: 6.73 K/uL (03-08 @ 07:33)  WBC Count: 8.61 K/uL (03-07 @ 13:50)      MICROBIOLOGY:  RECENT CULTURES:              PT/INR - ( 08 Mar 2023 07:33 )   PT: 11.6 sec;   INR: 0.99 ratio         PTT - ( 07 Mar 2023 15:00 )  PTT:26.6 sec    Sodium:  Sodium, Serum: 135 mmol/L (03-09 @ 07:05)  Sodium, Serum: 135 mmol/L (03-08 @ 07:33)  Sodium, Serum: 132 mmol/L (03-07 @ 13:50)      5.50 mg/dL 03-09 @ 07:05  9.80 mg/dL 03-08 @ 07:33  8.50 mg/dL 03-07 @ 13:50      Hemoglobin:  Hemoglobin: 10.9 g/dL (03-09 @ 08:30)  Hemoglobin: 9.6 g/dL (03-08 @ 07:33)  Hemoglobin: 10.9 g/dL (03-07 @ 13:50)      Platelets: Platelet Count - Automated: 191 K/uL (03-09 @ 08:30)  Platelet Count - Automated: 159 K/uL (03-08 @ 07:33)  Platelet Count - Automated: 166 K/uL (03-07 @ 13:50)      LIVER FUNCTIONS - ( 08 Mar 2023 07:33 )  Alb: 2.7 g/dL / Pro: 5.7 g/dL / ALK PHOS: 91 U/L / ALT: <6 U/L / AST: 7 U/L / GGT: x                 RADIOLOGY & ADDITIONAL STUDIES:      MICROBIOLOGY:  RECENT CULTURES:

## 2023-03-10 ENCOUNTER — TRANSCRIPTION ENCOUNTER (OUTPATIENT)
Age: 73
End: 2023-03-10

## 2023-03-10 LAB
ANION GAP SERPL CALC-SCNC: 7 MMOL/L — SIGNIFICANT CHANGE UP (ref 5–17)
BUN SERPL-MCNC: 72 MG/DL — HIGH (ref 7–23)
CALCIUM SERPL-MCNC: 8.9 MG/DL — SIGNIFICANT CHANGE UP (ref 8.5–10.1)
CHLORIDE SERPL-SCNC: 97 MMOL/L — SIGNIFICANT CHANGE UP (ref 96–108)
CO2 SERPL-SCNC: 32 MMOL/L — HIGH (ref 22–31)
CREAT SERPL-MCNC: 7.3 MG/DL — HIGH (ref 0.5–1.3)
EGFR: 5 ML/MIN/1.73M2 — LOW
GLUCOSE SERPL-MCNC: 189 MG/DL — HIGH (ref 70–99)
HCT VFR BLD CALC: 31.8 % — LOW (ref 34.5–45)
HGB BLD-MCNC: 10.1 G/DL — LOW (ref 11.5–15.5)
MCHC RBC-ENTMCNC: 30.3 PG — SIGNIFICANT CHANGE UP (ref 27–34)
MCHC RBC-ENTMCNC: 31.8 GM/DL — LOW (ref 32–36)
MCV RBC AUTO: 95.5 FL — SIGNIFICANT CHANGE UP (ref 80–100)
NRBC # BLD: 0 /100 WBCS — SIGNIFICANT CHANGE UP (ref 0–0)
PLATELET # BLD AUTO: 195 K/UL — SIGNIFICANT CHANGE UP (ref 150–400)
POTASSIUM SERPL-MCNC: 4.6 MMOL/L — SIGNIFICANT CHANGE UP (ref 3.5–5.3)
POTASSIUM SERPL-SCNC: 4.6 MMOL/L — SIGNIFICANT CHANGE UP (ref 3.5–5.3)
RBC # BLD: 3.33 M/UL — LOW (ref 3.8–5.2)
RBC # FLD: 12.8 % — SIGNIFICANT CHANGE UP (ref 10.3–14.5)
SODIUM SERPL-SCNC: 136 MMOL/L — SIGNIFICANT CHANGE UP (ref 135–145)
WBC # BLD: 9.12 K/UL — SIGNIFICANT CHANGE UP (ref 3.8–10.5)
WBC # FLD AUTO: 9.12 K/UL — SIGNIFICANT CHANGE UP (ref 3.8–10.5)

## 2023-03-10 PROCEDURE — 36245 INS CATH ABD/L-EXT ART 1ST: CPT | Mod: RT

## 2023-03-10 PROCEDURE — 99233 SBSQ HOSP IP/OBS HIGH 50: CPT

## 2023-03-10 PROCEDURE — 76937 US GUIDE VASCULAR ACCESS: CPT | Mod: 26

## 2023-03-10 PROCEDURE — 75710 ARTERY X-RAYS ARM/LEG: CPT | Mod: 26

## 2023-03-10 PROCEDURE — 99232 SBSQ HOSP IP/OBS MODERATE 35: CPT

## 2023-03-10 DEVICE — CATH OMNI FLSH 0.035IN 5FRX65: Type: IMPLANTABLE DEVICE | Site: RIGHT | Status: FUNCTIONAL

## 2023-03-10 DEVICE — KIT ACCESS MAK MINI 5FX10CM .018 X 60CM NIT/PALL TIP: Type: IMPLANTABLE DEVICE | Site: RIGHT | Status: FUNCTIONAL

## 2023-03-10 DEVICE — SHEATH INTRODUCER TERUMO PINNACLE RADIOPAQUE 5FR X 10CM: Type: IMPLANTABLE DEVICE | Site: RIGHT | Status: FUNCTIONAL

## 2023-03-10 DEVICE — GWIRE BENT STRT 0.035INX150CM: Type: IMPLANTABLE DEVICE | Site: RIGHT | Status: FUNCTIONAL

## 2023-03-10 RX ORDER — ONDANSETRON 8 MG/1
4 TABLET, FILM COATED ORAL ONCE
Refills: 0 | Status: DISCONTINUED | OUTPATIENT
Start: 2023-03-10 | End: 2023-03-11

## 2023-03-10 RX ORDER — FENTANYL CITRATE 50 UG/ML
25 INJECTION INTRAVENOUS
Refills: 0 | Status: DISCONTINUED | OUTPATIENT
Start: 2023-03-10 | End: 2023-03-11

## 2023-03-10 RX ORDER — SODIUM CHLORIDE 9 MG/ML
1000 INJECTION INTRAMUSCULAR; INTRAVENOUS; SUBCUTANEOUS
Refills: 0 | Status: DISCONTINUED | OUTPATIENT
Start: 2023-03-10 | End: 2023-03-11

## 2023-03-10 RX ADMIN — INSULIN GLARGINE 5 UNIT(S): 100 INJECTION, SOLUTION SUBCUTANEOUS at 21:57

## 2023-03-10 RX ADMIN — RISPERIDONE 0.5 MILLIGRAM(S): 4 TABLET ORAL at 21:57

## 2023-03-10 RX ADMIN — Medication 4: at 00:30

## 2023-03-10 RX ADMIN — SENNA PLUS 2 TABLET(S): 8.6 TABLET ORAL at 21:57

## 2023-03-10 RX ADMIN — Medication 2: at 07:41

## 2023-03-10 RX ADMIN — Medication 0.1 MILLIGRAM(S): at 06:01

## 2023-03-10 RX ADMIN — SODIUM CHLORIDE 30 MILLILITER(S): 9 INJECTION INTRAMUSCULAR; INTRAVENOUS; SUBCUTANEOUS at 07:20

## 2023-03-10 RX ADMIN — Medication 0.1 MILLIGRAM(S): at 21:25

## 2023-03-10 RX ADMIN — ATORVASTATIN CALCIUM 40 MILLIGRAM(S): 80 TABLET, FILM COATED ORAL at 21:57

## 2023-03-10 RX ADMIN — PANTOPRAZOLE SODIUM 40 MILLIGRAM(S): 20 TABLET, DELAYED RELEASE ORAL at 06:02

## 2023-03-10 RX ADMIN — SODIUM CHLORIDE 5 MILLILITER(S): 9 INJECTION INTRAMUSCULAR; INTRAVENOUS; SUBCUTANEOUS at 19:45

## 2023-03-10 RX ADMIN — Medication 100 MILLIGRAM(S): at 21:57

## 2023-03-10 NOTE — SOCIAL WORK PROGRESS NOTE - NSSWPROGRESSNOTE_GEN_ALL_CORE
TORIN spoke with this pt at bedside, pts son, [facility] and MD. Plan for DC tomorrow back to [facility]- transportation arranged for 3pm  via medicaid ambulette as pt is alert and oriented and ambulatory. TORIN spoke with [facility], they will see pt early tomorrow and aware of DC planning. Pt, family, team in agreement with dc. SW remains available.

## 2023-03-10 NOTE — BRIEF OPERATIVE NOTE - NSICDXBRIEFPROCEDURE_GEN_ALL_CORE_FT
PROCEDURES:  Angiogram, lower extremity, fluoroscopic, with topical ultrasound 10-Mar-2023 18:41:53  Jeannette Subramanian

## 2023-03-10 NOTE — PROGRESS NOTE ADULT - SUBJECTIVE AND OBJECTIVE BOX
Patient is a 72y Female whom presented to the hospital with esrd on hd     PAST MEDICAL & SURGICAL HISTORY:  HTN (hypertension)      h/o Anxiety attack      Depression      h/o Myocardial infarct 2007      h/o Hepatitis A 1969  currently resolved, no symptoms      Murmur, cardiac      h/o Smoking  quitted 3/2012      Anemia secondary to renal failure      ESRD on dialysis      Falls      Ataxia      Type 2 diabetes mellitus      Peripheral vascular disease, unspecified      CAD (coronary artery disease)      Diabetes      coronary stent 2007      s/p Ovarian cyst removal      s/p surgical removal of benign Skin lesion epigastric area      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 1000 milliLiter(s) (50 mL/Hr) IV Continuous <Continuous>  dextrose 5%. 1000 milliLiter(s) (100 mL/Hr) IV Continuous <Continuous>  dextrose 50% Injectable 25 Gram(s) IV Push once  dextrose 50% Injectable 12.5 Gram(s) IV Push once  dextrose 50% Injectable 25 Gram(s) IV Push once  glucagon  Injectable 1 milliGRAM(s) IntraMuscular once  imipramine 50 milliGRAM(s) Oral daily  insulin glargine Injectable (LANTUS) 5 Unit(s) SubCutaneous at bedtime  insulin lispro (ADMELOG) corrective regimen sliding scale   SubCutaneous three times a day before meals  insulin lispro (ADMELOG) corrective regimen sliding scale   SubCutaneous at bedtime  metoprolol tartrate 100 milliGRAM(s) Oral at bedtime  multivitamin 1 Tablet(s) Oral daily  pantoprazole    Tablet 40 milliGRAM(s) Oral before breakfast  senna 2 Tablet(s) Oral at bedtime      Allergies    latex (Hives)  No Known Drug Allergies    Intolerances        SOCIAL HISTORY:  Denies ETOh,Smoking,     FAMILY HISTORY:  FH: myocardial infarction (Father)    FH: myocardial infarction (Father)    Family history of type 2 diabetes mellitus in brother (Sibling)        REVIEW OF SYSTEMS:    CONSTITUTIONAL: No weakness, fevers or chills  RESPIRATORY: No cough, wheezing, hemoptysis; No shortness of breath  CARDIOVASCULAR: No chest pain or palpitations  GASTROINTESTINAL: No abdominal or epigastric pain. No nausea, vomiting,     No diarrhea or constipation. No melena   GENITOURINARY: No dysuria, frequency or hematuria  NEUROLOGICAL: No numbness or weakness  SKIN: dry      MEDICATIONS  (STANDING):  aspirin enteric coated 81 milliGRAM(s) Oral daily  atorvastatin 40 milliGRAM(s) Oral at bedtime  cloNIDine 0.1 milliGRAM(s) Oral two times a day  dextrose 5%. 1000 milliLiter(s) (50 mL/Hr) IV Continuous <Continuous>  dextrose 5%. 1000 milliLiter(s) (100 mL/Hr) IV Continuous <Continuous>  dextrose 50% Injectable 25 Gram(s) IV Push once  dextrose 50% Injectable 12.5 Gram(s) IV Push once  dextrose 50% Injectable 25 Gram(s) IV Push once  epoetin fawad-epbx (RETACRIT) Injectable 04044 Unit(s) IV Push <User Schedule>  glucagon  Injectable 1 milliGRAM(s) IntraMuscular once  imipramine 50 milliGRAM(s) Oral daily  insulin glargine Injectable (LANTUS) 5 Unit(s) SubCutaneous at bedtime  insulin lispro (ADMELOG) corrective regimen sliding scale   SubCutaneous every 6 hours  metoprolol tartrate 100 milliGRAM(s) Oral at bedtime  multivitamin 1 Tablet(s) Oral daily  pantoprazole    Tablet 40 milliGRAM(s) Oral before breakfast  risperiDONE   Tablet 0.5 milliGRAM(s) Oral at bedtime  senna 2 Tablet(s) Oral at bedtime  sodium zirconium cyclosilicate 10 Gram(s) Oral <User Schedule>                                                                  10.1   9.12  )-----------( 195      ( 10 Mar 2023 08:17 )             31.8       CBC Full  -  ( 10 Mar 2023 08:17 )  WBC Count : 9.12 K/uL  RBC Count : 3.33 M/uL  Hemoglobin : 10.1 g/dL  Hematocrit : 31.8 %  Platelet Count - Automated : 195 K/uL  Mean Cell Volume : 95.5 fl  Mean Cell Hemoglobin : 30.3 pg  Mean Cell Hemoglobin Concentration : 31.8 gm/dL  Auto Neutrophil # : x  Auto Lymphocyte # : x  Auto Monocyte # : x  Auto Eosinophil # : x  Auto Basophil # : x  Auto Neutrophil % : x  Auto Lymphocyte % : x  Auto Monocyte % : x  Auto Eosinophil % : x  Auto Basophil % : x      03-10    136  |  97  |  72<H>  ----------------------------<  189<H>  4.6   |  32<H>  |  7.30<H>    Ca    8.9      10 Mar 2023 08:17        CAPILLARY BLOOD GLUCOSE      POCT Blood Glucose.: 164 mg/dL (10 Mar 2023 15:54)  POCT Blood Glucose.: 163 mg/dL (10 Mar 2023 11:48)  POCT Blood Glucose.: 197 mg/dL (10 Mar 2023 07:22)  POCT Blood Glucose.: 230 mg/dL (09 Mar 2023 23:59)  POCT Blood Glucose.: 251 mg/dL (09 Mar 2023 21:43)  POCT Blood Glucose.: 236 mg/dL (09 Mar 2023 17:04)      Vital Signs Last 24 Hrs  T(C): 36.6 (10 Mar 2023 15:33), Max: 36.8 (09 Mar 2023 21:29)  T(F): 97.9 (10 Mar 2023 15:33), Max: 98.2 (09 Mar 2023 21:29)  HR: 71 (10 Mar 2023 15:33) (62 - 79)  BP: 145/53 (10 Mar 2023 15:33) (136/74 - 151/70)  BP(mean): --  RR: 14 (10 Mar 2023 15:33) (14 - 18)  SpO2: 100% (10 Mar 2023 15:33) (93% - 100%)    Parameters below as of 10 Mar 2023 13:13  Patient On (Oxygen Delivery Method): room air                   PHYSICAL EXAM:    Constitutional: NAD  HEENT: conjunctive   clear   Neck:  No JVD  Respiratory: CTAB  Cardiovascular: S1 and S2  Gastrointestinal: BS+, soft, NT/ND  Extremities: No peripheral edema  Neurological: A/O x 3, no focal deficits  Psychiatric: Normal mood, normal affect  : No Renteria  Skin: No rashes  Access: pos right arm  fistula   partial ray amputation of right great toe

## 2023-03-10 NOTE — DISCHARGE NOTE NURSING/CASE MANAGEMENT/SOCIAL WORK - NSDCVIVACCINE_GEN_ALL_CORE_FT
Tdap; 27-Nov-2015 19:03; Bob Kahn (LEV); [employer]; [id number]; IntraMuscular; Deltoid Left.; 0.5 milliLiter(s); VIS (VIS Published: 09-May-2013, VIS Presented: 27-Nov-2015);

## 2023-03-10 NOTE — PROVIDER CONTACT NOTE (OTHER) - SITUATION
Pt NPO x meds IVF NS @ 30cc/hr ordered. RN discussed with NP pt with hx HTN & DM. If D5-NS would be preferred. Pt scheduled for angiogram today Posterior Auricular Interpolation Flap Text: A decision was made to reconstruct the defect utilizing an interpolation axial flap and a staged reconstruction.  A telfa template was made of the defect.  This telfa template was then used to outline the posterior auricular interpolation flap.  The donor area for the pedicle flap was then injected with anesthesia.  The flap was excised through the skin and subcutaneous tissue down to the layer of the underlying musculature.  The pedicle flap was carefully excised within this deep plane to maintain its blood supply.  The edges of the donor site were undermined.   The donor site was closed in a primary fashion.  The pedicle was then rotated into position and sutured.  Once the tube was sutured into place, adequate blood supply was confirmed with blanching and refill.  The pedicle was then wrapped with xeroform gauze and dressed appropriately with a telfa and gauze bandage to ensure continued blood supply and protect the attached pedicle.

## 2023-03-10 NOTE — PROGRESS NOTE ADULT - SUBJECTIVE AND OBJECTIVE BOX
Patient is a 72y Female with a known history of :  PAD (peripheral artery disease) [I73.9]    HTN (hypertension) [I10]    DM2 (diabetes mellitus, type 2) [E11.9]    ESRD on hemodialysis [N18.6]    Afib [I48.91]    CAD (coronary artery disease) [I25.10]    Prophylactic measure [Z29.9]    MDD (major depressive disorder) [F32.9]    Ataxia [R27.0]    Hyperkalemia [E87.5]      HPI:  72-year-old female with history of A-fib, diabetes, hypertension, hyperlipidemia, end-stage renal disease hemodialysis, CHF, CAD/CABG, PVD sent in from [facility] for admission for right lower extremity angiogram ,patient is scheduled by vascular team for intervention tomorrow and requires cardiology clearance ,labs ,EKG and chest xray .  Patient otherwise feeling well and has no complaints. Last dialysis - yesterday. Patient was recently hospitaliazed with malfunctioning HD fistula , nephrologist contacted . (07 Mar 2023 16:13)      REVIEW OF SYSTEMS:    CONSTITUTIONAL: No fever, weight loss, or fatigue  EYES: No eye pain, visual disturbances, or discharge  ENMT:  No difficulty hearing, tinnitus, vertigo; No sinus or throat pain  NECK: No pain or stiffness  BREASTS: No pain, masses, or nipple discharge  RESPIRATORY: No cough, wheezing, chills or hemoptysis; No shortness of breath  CARDIOVASCULAR: No chest pain, palpitations, dizziness, or leg swelling  GASTROINTESTINAL: No abdominal or epigastric pain. No nausea, vomiting, or hematemesis; No diarrhea or constipation. No melena or hematochezia.  GENITOURINARY: No dysuria, frequency, hematuria, or incontinence  NEUROLOGICAL: No headaches, memory loss, loss of strength, numbness, or tremors  SKIN: No itching, burning, rashes, or lesions   LYMPH NODES: No enlarged glands  ENDOCRINE: No heat or cold intolerance; No hair loss  MUSCULOSKELETAL: No joint pain or swelling; No muscle, back, or extremity pain  PSYCHIATRIC: No depression, anxiety, mood swings, or difficulty sleeping  HEME/LYMPH: No easy bruising, or bleeding gums  ALLERGY AND IMMUNOLOGIC: No hives or eczema    MEDICATIONS  (STANDING):  aspirin enteric coated 81 milliGRAM(s) Oral daily  atorvastatin 40 milliGRAM(s) Oral at bedtime  ceFAZolin   IVPB 2000 milliGRAM(s) IV Intermittent once  cloNIDine 0.1 milliGRAM(s) Oral two times a day  dextrose 5%. 1000 milliLiter(s) (50 mL/Hr) IV Continuous <Continuous>  dextrose 5%. 1000 milliLiter(s) (100 mL/Hr) IV Continuous <Continuous>  dextrose 50% Injectable 25 Gram(s) IV Push once  dextrose 50% Injectable 12.5 Gram(s) IV Push once  dextrose 50% Injectable 25 Gram(s) IV Push once  epoetin fawad-epbx (RETACRIT) Injectable 81132 Unit(s) IV Push <User Schedule>  glucagon  Injectable 1 milliGRAM(s) IntraMuscular once  heparin   Injectable 5000 Unit(s) SubCutaneous every 12 hours  imipramine 50 milliGRAM(s) Oral daily  insulin glargine Injectable (LANTUS) 5 Unit(s) SubCutaneous at bedtime  insulin lispro (ADMELOG) corrective regimen sliding scale   SubCutaneous every 6 hours  metoprolol tartrate 100 milliGRAM(s) Oral at bedtime  multivitamin 1 Tablet(s) Oral daily  pantoprazole    Tablet 40 milliGRAM(s) Oral before breakfast  risperiDONE   Tablet 0.5 milliGRAM(s) Oral at bedtime  senna 2 Tablet(s) Oral at bedtime  sodium chloride 0.9%. 1000 milliLiter(s) (30 mL/Hr) IV Continuous <Continuous>  sodium zirconium cyclosilicate 10 Gram(s) Oral <User Schedule>    MEDICATIONS  (PRN):  acetaminophen     Tablet .. 650 milliGRAM(s) Oral every 6 hours PRN Temp greater or equal to 38C (100.4F), Mild Pain (1 - 3)  aluminum hydroxide/magnesium hydroxide/simethicone Suspension 30 milliLiter(s) Oral every 4 hours PRN Dyspepsia  dextrose Oral Gel 15 Gram(s) Oral once PRN Blood Glucose LESS THAN 70 milliGRAM(s)/deciliter  melatonin 3 milliGRAM(s) Oral at bedtime PRN Insomnia  ondansetron Injectable 4 milliGRAM(s) IV Push every 8 hours PRN Nausea and/or Vomiting  traMADol 50 milliGRAM(s) Oral four times a day PRN Moderate Pain (4 - 6)      ALLERGIES: latex (Hives)  No Known Drug Allergies      FAMILY HISTORY:  FH: myocardial infarction (Father)    FH: myocardial infarction (Father)    Family history of type 2 diabetes mellitus in brother (Sibling)        PHYSICAL EXAMINATION:  -----------------------------  T(C): 36.7 (03-10-23 @ 05:10), Max: 36.8 (03-09-23 @ 21:29)  HR: 64 (03-10-23 @ 05:10) (64 - 79)  BP: 151/66 (03-10-23 @ 05:10) (136/74 - 151/66)  RR: 17 (03-10-23 @ 05:10) (17 - 18)  SpO2: 97% (03-10-23 @ 05:10) (91% - 97%)  Wt(kg): --        VITALS  T(C): 36.7 (03-10-23 @ 05:10), Max: 36.8 (03-09-23 @ 21:29)  HR: 64 (03-10-23 @ 05:10) (64 - 79)  BP: 151/66 (03-10-23 @ 05:10) (136/74 - 151/66)  RR: 17 (03-10-23 @ 05:10) (17 - 18)  SpO2: 97% (03-10-23 @ 05:10) (91% - 97%)    Constitutional: well developed, normal appearance, well groomed, well nourished, no deformities and no acute distress.   Eyes: the conjunctiva exhibited no abnormalities and the eyelids demonstrated no xanthelasmas.   HEENT: normal oral mucosa, no oral pallor and no oral cyanosis.   Neck: normal jugular venous A waves present, normal jugular venous V waves present and no jugular venous wilson A waves.   Pulmonary: no respiratory distress, normal respiratory rhythm and effort, no accessory muscle use and lungs were clear to auscultation bilaterally.   Cardiovascular: heart rate and rhythm were normal, normal S1 and S2 and no murmur, gallop, rub, heave or thrill are present.   Abdomen: soft, non-tender, no hepato-splenomegaly and no abdominal mass palpated.   Musculoskeletal: the gait could not be assessed..   Extremities: no clubbing of the fingernails, no localized cyanosis, no petechial hemorrhages and no ischemic changes.   Skin: normal skin color and pigmentation, no rash, no venous stasis, no skin lesions, no skin ulcer and no xanthoma was observed.   Psychiatric: oriented to person, place, and time, the affect was normal, the mood was normal and not feeling anxious.     LABS:   --------  03-09    135  |  98  |  50<H>  ----------------------------<  185<H>  4.2   |  31  |  5.50<H>    Ca    8.9      09 Mar 2023 07:05                           10.9   10.14 )-----------( 191      ( 09 Mar 2023 08:30 )             32.9                 RADIOLOGY:  -----------------    ECG:     ECHO:

## 2023-03-10 NOTE — PROGRESS NOTE ADULT - SUBJECTIVE AND OBJECTIVE BOX
POST OPERATIVE NOTE  Patient: SHILO KOHLER 72y (1950) Female   MRN: [id number]  Visit: 03-07-23 Inpatient  Date: 03-10-23 @ 21:10    Procedure: S/P RLE angiogram    Subjective: Patient seen and examined at bedside, sleeping. Minimally responsive to ROS but appears comfortable.    Objective:  Vitals: T(F): 97.5 (03-10-23 @ 19:59), Max: 98.2 (03-09-23 @ 21:29)  HR: 62 (03-10-23 @ 20:29)  BP: 173/58 (03-10-23 @ 19:59) (138/53 - 173/58)  RR: 12 (03-10-23 @ 20:29)  SpO2: 100% (03-10-23 @ 20:29)  Vent Settings:     In:   IV Fluids: dextrose 5%. 1000 milliLiter(s) (50 mL/Hr) IV Continuous <Continuous>  dextrose 5%. 1000 milliLiter(s) (100 mL/Hr) IV Continuous <Continuous>  multivitamin 1 Tablet(s) Oral daily  sodium chloride 0.9%. 1000 milliLiter(s) (5 mL/Hr) IV Continuous <Continuous>    Out:     Physical Exam:  GENERAL: NAD, resting comfortably in bed  HEENT:  Atraumatic, normocephalic; neck supple  CHEST/LUNG: good inspiratory effort  HEART: Rate & rhythm regular; +S1/S2  ABDOMEN: Soft, nondistended  L GROIN: clean and dry drsg in place, no palpable mass or fluctuance  RLE: DP signal present via doppler, warm and dry  SKIN: Warm & dry    Medications: [Standing]  acetaminophen     Tablet .. 650 milliGRAM(s) Oral every 6 hours PRN  aluminum hydroxide/magnesium hydroxide/simethicone Suspension 30 milliLiter(s) Oral every 4 hours PRN  aspirin enteric coated 81 milliGRAM(s) Oral daily  atorvastatin 40 milliGRAM(s) Oral at bedtime  cloNIDine 0.1 milliGRAM(s) Oral two times a day  dextrose 5%. 1000 milliLiter(s) IV Continuous <Continuous>  dextrose 5%. 1000 milliLiter(s) IV Continuous <Continuous>  dextrose 50% Injectable 25 Gram(s) IV Push once  dextrose 50% Injectable 12.5 Gram(s) IV Push once  dextrose 50% Injectable 25 Gram(s) IV Push once  dextrose Oral Gel 15 Gram(s) Oral once PRN  epoetin fawad-epbx (RETACRIT) Injectable 75254 Unit(s) IV Push <User Schedule>  fentaNYL    Injectable 25 MICROGram(s) IV Push every 15 minutes PRN  glucagon  Injectable 1 milliGRAM(s) IntraMuscular once  heparin   Injectable 5000 Unit(s) SubCutaneous every 12 hours  imipramine 50 milliGRAM(s) Oral daily  insulin glargine Injectable (LANTUS) 5 Unit(s) SubCutaneous at bedtime  insulin lispro (ADMELOG) corrective regimen sliding scale   SubCutaneous every 6 hours  melatonin 3 milliGRAM(s) Oral at bedtime PRN  metoprolol tartrate 100 milliGRAM(s) Oral at bedtime  multivitamin 1 Tablet(s) Oral daily  ondansetron Injectable 4 milliGRAM(s) IV Push every 8 hours PRN  ondansetron Injectable 4 milliGRAM(s) IV Push once PRN  pantoprazole    Tablet 40 milliGRAM(s) Oral before breakfast  risperiDONE   Tablet 0.5 milliGRAM(s) Oral at bedtime  senna 2 Tablet(s) Oral at bedtime  sodium chloride 0.9%. 1000 milliLiter(s) IV Continuous <Continuous>  sodium zirconium cyclosilicate 10 Gram(s) Oral <User Schedule>  traMADol 50 milliGRAM(s) Oral four times a day PRN    Medications: [PRN]  acetaminophen     Tablet .. 650 milliGRAM(s) Oral every 6 hours PRN  aluminum hydroxide/magnesium hydroxide/simethicone Suspension 30 milliLiter(s) Oral every 4 hours PRN  aspirin enteric coated 81 milliGRAM(s) Oral daily  atorvastatin 40 milliGRAM(s) Oral at bedtime  cloNIDine 0.1 milliGRAM(s) Oral two times a day  dextrose 5%. 1000 milliLiter(s) IV Continuous <Continuous>  dextrose 5%. 1000 milliLiter(s) IV Continuous <Continuous>  dextrose 50% Injectable 25 Gram(s) IV Push once  dextrose 50% Injectable 12.5 Gram(s) IV Push once  dextrose 50% Injectable 25 Gram(s) IV Push once  dextrose Oral Gel 15 Gram(s) Oral once PRN  epoetin fawad-epbx (RETACRIT) Injectable 24932 Unit(s) IV Push <User Schedule>  fentaNYL    Injectable 25 MICROGram(s) IV Push every 15 minutes PRN  glucagon  Injectable 1 milliGRAM(s) IntraMuscular once  heparin   Injectable 5000 Unit(s) SubCutaneous every 12 hours  imipramine 50 milliGRAM(s) Oral daily  insulin glargine Injectable (LANTUS) 5 Unit(s) SubCutaneous at bedtime  insulin lispro (ADMELOG) corrective regimen sliding scale   SubCutaneous every 6 hours  melatonin 3 milliGRAM(s) Oral at bedtime PRN  metoprolol tartrate 100 milliGRAM(s) Oral at bedtime  multivitamin 1 Tablet(s) Oral daily  ondansetron Injectable 4 milliGRAM(s) IV Push every 8 hours PRN  ondansetron Injectable 4 milliGRAM(s) IV Push once PRN  pantoprazole    Tablet 40 milliGRAM(s) Oral before breakfast  risperiDONE   Tablet 0.5 milliGRAM(s) Oral at bedtime  senna 2 Tablet(s) Oral at bedtime  sodium chloride 0.9%. 1000 milliLiter(s) IV Continuous <Continuous>  sodium zirconium cyclosilicate 10 Gram(s) Oral <User Schedule>  traMADol 50 milliGRAM(s) Oral four times a day PRN      Labs:                        10.1   9.12  )-----------( 195      ( 10 Mar 2023 08:17 )             31.8     03-10    136  |  97  |  72<H>  ----------------------------<  189<H>  4.6   |  32<H>  |  7.30<H>    Ca    8.9      10 Mar 2023 08:17      Imaging:  No post-op imaging studies    Assessment:  72y Female patient S/P RLE angiogram.    Plan:  - Continue current care, diet  - Pain control PRN, supportive care  - May ASA and SQH resume in AM  - Plan for HD tomorrow 3/11/23, nephro following  - Will continue to monitor    Surgical Team: [id number]   POST OPERATIVE NOTE  Patient: SHILO KOHLER 72y (1950) Female   MRN: [id number]  Visit: 03-07-23 Inpatient  Date: 03-10-23 @ 21:10    Procedure: S/P RLE angiogram    Subjective: Patient seen and examined at bedside, sleeping. Minimally responsive to ROS but appears comfortable.    Objective:  Vitals: T(F): 97.5 (03-10-23 @ 19:59), Max: 98.2 (03-09-23 @ 21:29)  HR: 62 (03-10-23 @ 20:29)  BP: 173/58 (03-10-23 @ 19:59) (138/53 - 173/58)  RR: 12 (03-10-23 @ 20:29)  SpO2: 100% (03-10-23 @ 20:29)  Vent Settings:     In:   IV Fluids: dextrose 5%. 1000 milliLiter(s) (50 mL/Hr) IV Continuous <Continuous>  dextrose 5%. 1000 milliLiter(s) (100 mL/Hr) IV Continuous <Continuous>  multivitamin 1 Tablet(s) Oral daily  sodium chloride 0.9%. 1000 milliLiter(s) (5 mL/Hr) IV Continuous <Continuous>    Out:     Physical Exam:  GENERAL: NAD, resting comfortably in bed  HEENT:  Atraumatic, normocephalic; neck supple  CHEST/LUNG: good inspiratory effort  HEART: Rate & rhythm regular; +S1/S2  ABDOMEN: Soft, nondistended  L GROIN: clean and dry drsg in place, no palpable mass or fluctuance  RLE: DP signal present via doppler, warm and dry  SKIN: Warm & dry    Medications: [Standing]  acetaminophen     Tablet .. 650 milliGRAM(s) Oral every 6 hours PRN  aluminum hydroxide/magnesium hydroxide/simethicone Suspension 30 milliLiter(s) Oral every 4 hours PRN  aspirin enteric coated 81 milliGRAM(s) Oral daily  atorvastatin 40 milliGRAM(s) Oral at bedtime  cloNIDine 0.1 milliGRAM(s) Oral two times a day  dextrose 5%. 1000 milliLiter(s) IV Continuous <Continuous>  dextrose 5%. 1000 milliLiter(s) IV Continuous <Continuous>  dextrose 50% Injectable 25 Gram(s) IV Push once  dextrose 50% Injectable 12.5 Gram(s) IV Push once  dextrose 50% Injectable 25 Gram(s) IV Push once  dextrose Oral Gel 15 Gram(s) Oral once PRN  epoetin fawad-epbx (RETACRIT) Injectable 37861 Unit(s) IV Push <User Schedule>  fentaNYL    Injectable 25 MICROGram(s) IV Push every 15 minutes PRN  glucagon  Injectable 1 milliGRAM(s) IntraMuscular once  heparin   Injectable 5000 Unit(s) SubCutaneous every 12 hours  imipramine 50 milliGRAM(s) Oral daily  insulin glargine Injectable (LANTUS) 5 Unit(s) SubCutaneous at bedtime  insulin lispro (ADMELOG) corrective regimen sliding scale   SubCutaneous every 6 hours  melatonin 3 milliGRAM(s) Oral at bedtime PRN  metoprolol tartrate 100 milliGRAM(s) Oral at bedtime  multivitamin 1 Tablet(s) Oral daily  ondansetron Injectable 4 milliGRAM(s) IV Push every 8 hours PRN  ondansetron Injectable 4 milliGRAM(s) IV Push once PRN  pantoprazole    Tablet 40 milliGRAM(s) Oral before breakfast  risperiDONE   Tablet 0.5 milliGRAM(s) Oral at bedtime  senna 2 Tablet(s) Oral at bedtime  sodium chloride 0.9%. 1000 milliLiter(s) IV Continuous <Continuous>  sodium zirconium cyclosilicate 10 Gram(s) Oral <User Schedule>  traMADol 50 milliGRAM(s) Oral four times a day PRN    Medications: [PRN]  acetaminophen     Tablet .. 650 milliGRAM(s) Oral every 6 hours PRN  aluminum hydroxide/magnesium hydroxide/simethicone Suspension 30 milliLiter(s) Oral every 4 hours PRN  aspirin enteric coated 81 milliGRAM(s) Oral daily  atorvastatin 40 milliGRAM(s) Oral at bedtime  cloNIDine 0.1 milliGRAM(s) Oral two times a day  dextrose 5%. 1000 milliLiter(s) IV Continuous <Continuous>  dextrose 5%. 1000 milliLiter(s) IV Continuous <Continuous>  dextrose 50% Injectable 25 Gram(s) IV Push once  dextrose 50% Injectable 12.5 Gram(s) IV Push once  dextrose 50% Injectable 25 Gram(s) IV Push once  dextrose Oral Gel 15 Gram(s) Oral once PRN  epoetin fawad-epbx (RETACRIT) Injectable 58341 Unit(s) IV Push <User Schedule>  fentaNYL    Injectable 25 MICROGram(s) IV Push every 15 minutes PRN  glucagon  Injectable 1 milliGRAM(s) IntraMuscular once  heparin   Injectable 5000 Unit(s) SubCutaneous every 12 hours  imipramine 50 milliGRAM(s) Oral daily  insulin glargine Injectable (LANTUS) 5 Unit(s) SubCutaneous at bedtime  insulin lispro (ADMELOG) corrective regimen sliding scale   SubCutaneous every 6 hours  melatonin 3 milliGRAM(s) Oral at bedtime PRN  metoprolol tartrate 100 milliGRAM(s) Oral at bedtime  multivitamin 1 Tablet(s) Oral daily  ondansetron Injectable 4 milliGRAM(s) IV Push every 8 hours PRN  ondansetron Injectable 4 milliGRAM(s) IV Push once PRN  pantoprazole    Tablet 40 milliGRAM(s) Oral before breakfast  risperiDONE   Tablet 0.5 milliGRAM(s) Oral at bedtime  senna 2 Tablet(s) Oral at bedtime  sodium chloride 0.9%. 1000 milliLiter(s) IV Continuous <Continuous>  sodium zirconium cyclosilicate 10 Gram(s) Oral <User Schedule>  traMADol 50 milliGRAM(s) Oral four times a day PRN      Labs:                        10.1   9.12  )-----------( 195      ( 10 Mar 2023 08:17 )             31.8     03-10    136  |  97  |  72<H>  ----------------------------<  189<H>  4.6   |  32<H>  |  7.30<H>    Ca    8.9      10 Mar 2023 08:17      Imaging:  No post-op imaging studies    Assessment:  72y Female patient S/P RLE angiogram.    Plan:  - Continue current care, diet  - Pain control PRN, supportive care  - May resume ASA and SQH in AM  - Plan for HD tomorrow 3/11/23, nephro following  - Will continue to monitor    Surgical Team: [id number]

## 2023-03-10 NOTE — BRIEF OPERATIVE NOTE - TYPE OF ANESTHESIA
Called mom. Left VM message that this writer forgot to give her the physical form. Informed her that it will be ready for  at the  [place] office.    [facility]

## 2023-03-10 NOTE — PROGRESS NOTE ADULT - SUBJECTIVE AND OBJECTIVE BOX
SHILO KOHLER    PLV 1EAS 114 D1    Allergies    latex (Hives)  No Known Drug Allergies    Intolerances        PAST MEDICAL & SURGICAL HISTORY:  HTN (hypertension)      h/o Anxiety attack      Depression      h/o Myocardial infarct 2007      h/o Hepatitis A 1969  currently resolved, no symptoms      Murmur, cardiac      h/o Smoking  quitted 3/2012      Anemia secondary to renal failure      ESRD on dialysis      Falls      Ataxia      Type 2 diabetes mellitus      Peripheral vascular disease, unspecified      CAD (coronary artery disease)      Diabetes      coronary stent 2007      s/p Ovarian cyst removal      s/p surgical removal of benign Skin lesion epigastric area      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          FAMILY HISTORY:  FH: myocardial infarction (Father)    FH: myocardial infarction (Father)    Family history of type 2 diabetes mellitus in brother (Sibling)        Home Medications:  acetaminophen 325 mg oral tablet: 2 tab(s) orally every 6 hours, As needed, Temp greater or equal to 38C (100.4F), Mild Pain (1 - 3) (08 Mar 2023 12:30)  Admelog SoloStar 100 units/mL injectable solution: inject subcutaneous route 3 times a day per sliding scale  (08 Mar 2023 12:30)  aspirin 81 mg oral delayed release tablet: 1 tab(s) orally once a day (at bedtime) (08 Mar 2023 12:30)  atorvastatin 40 mg oral tablet: 1 tab(s) orally once a day (at bedtime) (08 Mar 2023 12:30)  cloNIDine 0.1 mg oral tablet: 1 tab(s) orally 2 times a day (08 Mar 2023 12:30)  imipramine 50 mg oral tablet: 1 tab(s) orally once a day (08 Mar 2023 12:30)  Lantus Solostar Pen 100 units/mL subcutaneous solution: 20 unit(s) subcutaneous once a day (at bedtime) (08 Mar 2023 12:30)  Lokelma 10 g oral powder for reconstitution: 10 gram(s) orally 2 times a week on Wed and Sat  (08 Mar 2023 12:30)  Melatonin 3 mg oral tablet: 1 tab(s) orally once a day (at bedtime), As Needed (08 Mar 2023 12:30)  metoprolol tartrate 100 mg oral tablet: 1 tab(s) orally once a day (at bedtime) (08 Mar 2023 12:30)  Nephro-Alfie oral tablet: 1 tab(s) orally once a day (08 Mar 2023 12:30)  PANTOPRAZOLE 40MG DR TAB: 1 tab(s) orally once a day (08 Mar 2023 12:30)  risperiDONE 0.5 mg oral tablet: 1 tab(s) orally once a day (at bedtime) (08 Mar 2023 12:30)  Senna 8.6 mg oral tablet: 1 tab(s) orally once a day (at bedtime) (08 Mar 2023 12:30)  silver sulfADIAZINE 1% topical cream: Apply topically to affected area once a day (08 Mar 2023 12:30)      MEDICATIONS  (STANDING):  aspirin enteric coated 81 milliGRAM(s) Oral daily  atorvastatin 40 milliGRAM(s) Oral at bedtime  ceFAZolin   IVPB 2000 milliGRAM(s) IV Intermittent once  cloNIDine 0.1 milliGRAM(s) Oral two times a day  dextrose 5%. 1000 milliLiter(s) (50 mL/Hr) IV Continuous <Continuous>  dextrose 5%. 1000 milliLiter(s) (100 mL/Hr) IV Continuous <Continuous>  dextrose 50% Injectable 25 Gram(s) IV Push once  dextrose 50% Injectable 12.5 Gram(s) IV Push once  dextrose 50% Injectable 25 Gram(s) IV Push once  epoetin fawad-epbx (RETACRIT) Injectable 35416 Unit(s) IV Push <User Schedule>  glucagon  Injectable 1 milliGRAM(s) IntraMuscular once  heparin   Injectable 5000 Unit(s) SubCutaneous every 12 hours  imipramine 50 milliGRAM(s) Oral daily  insulin glargine Injectable (LANTUS) 5 Unit(s) SubCutaneous at bedtime  insulin lispro (ADMELOG) corrective regimen sliding scale   SubCutaneous every 6 hours  metoprolol tartrate 100 milliGRAM(s) Oral at bedtime  multivitamin 1 Tablet(s) Oral daily  pantoprazole    Tablet 40 milliGRAM(s) Oral before breakfast  risperiDONE   Tablet 0.5 milliGRAM(s) Oral at bedtime  senna 2 Tablet(s) Oral at bedtime  sodium chloride 0.9%. 1000 milliLiter(s) (30 mL/Hr) IV Continuous <Continuous>  sodium zirconium cyclosilicate 10 Gram(s) Oral <User Schedule>    MEDICATIONS  (PRN):  acetaminophen     Tablet .. 650 milliGRAM(s) Oral every 6 hours PRN Temp greater or equal to 38C (100.4F), Mild Pain (1 - 3)  aluminum hydroxide/magnesium hydroxide/simethicone Suspension 30 milliLiter(s) Oral every 4 hours PRN Dyspepsia  dextrose Oral Gel 15 Gram(s) Oral once PRN Blood Glucose LESS THAN 70 milliGRAM(s)/deciliter  melatonin 3 milliGRAM(s) Oral at bedtime PRN Insomnia  ondansetron Injectable 4 milliGRAM(s) IV Push every 8 hours PRN Nausea and/or Vomiting  traMADol 50 milliGRAM(s) Oral four times a day PRN Moderate Pain (4 - 6)      Diet, NPO after Midnight:      NPO Start Date: 09-Mar-2023,   NPO Start Time: 23:59  Except Medications (03-09-23 @ 07:35) [Active]  Diet, Consistent Carbohydrate Renal w/Evening Snack:   Soft and Bite Sized (SOFTBTSZ)  1500mL Fluid Restriction ([id number])  For patients receiving Renal Replacement - No Protein Restr, No Conc K, No Conc Phos, Low Sodium (RENAL)  Supplement Feeding Modality:  Oral  Nepro Cans or Servings Per Day:  1       Frequency:  Two Times a day (03-08-23 @ 11:35) [Active]          Vital Signs Last 24 Hrs  T(C): 36.7 (10 Mar 2023 05:10), Max: 36.8 (09 Mar 2023 21:29)  T(F): 98.1 (10 Mar 2023 05:10), Max: 98.2 (09 Mar 2023 21:29)  HR: 64 (10 Mar 2023 05:10) (64 - 79)  BP: 151/66 (10 Mar 2023 05:10) (136/74 - 151/66)  BP(mean): --  RR: 17 (10 Mar 2023 05:10) (17 - 18)  SpO2: 97% (10 Mar 2023 05:10) (91% - 97%)    Parameters below as of 10 Mar 2023 05:10  Patient On (Oxygen Delivery Method): room air                  LABS:                        10.9   10.14 )-----------( 191      ( 09 Mar 2023 08:30 )             32.9     03-09    135  |  98  |  50<H>  ----------------------------<  185<H>  4.2   |  31  |  5.50<H>    Ca    8.9      09 Mar 2023 07:05                WBC:  WBC Count: 10.14 K/uL (03-09 @ 08:30)  WBC Count: 6.73 K/uL (03-08 @ 07:33)  WBC Count: 8.61 K/uL (03-07 @ 13:50)      MICROBIOLOGY:  RECENT CULTURES:                  Sodium:  Sodium, Serum: 135 mmol/L (03-09 @ 07:05)  Sodium, Serum: 135 mmol/L (03-08 @ 07:33)  Sodium, Serum: 132 mmol/L (03-07 @ 13:50)      5.50 mg/dL 03-09 @ 07:05  9.80 mg/dL 03-08 @ 07:33  8.50 mg/dL 03-07 @ 13:50      Hemoglobin:  Hemoglobin: 10.9 g/dL (03-09 @ 08:30)  Hemoglobin: 9.6 g/dL (03-08 @ 07:33)  Hemoglobin: 10.9 g/dL (03-07 @ 13:50)      Platelets: Platelet Count - Automated: 191 K/uL (03-09 @ 08:30)  Platelet Count - Automated: 159 K/uL (03-08 @ 07:33)  Platelet Count - Automated: 166 K/uL (03-07 @ 13:50)              RADIOLOGY & ADDITIONAL STUDIES:      MICROBIOLOGY:  RECENT CULTURES:

## 2023-03-10 NOTE — PROGRESS NOTE ADULT - SUBJECTIVE AND OBJECTIVE BOX
Patient is a 72y old  Female who presents with a chief complaint of admitted for RLE angiogram (10 Mar 2023 08:27)  Sleepy today  Arousable and offers no complaints  Preop for RLE angio today    MEDICATIONS  (STANDING):  aspirin enteric coated 81 milliGRAM(s) Oral daily  atorvastatin 40 milliGRAM(s) Oral at bedtime  ceFAZolin   IVPB 2000 milliGRAM(s) IV Intermittent once  cloNIDine 0.1 milliGRAM(s) Oral two times a day  dextrose 5%. 1000 milliLiter(s) (50 mL/Hr) IV Continuous <Continuous>  dextrose 5%. 1000 milliLiter(s) (100 mL/Hr) IV Continuous <Continuous>  dextrose 50% Injectable 25 Gram(s) IV Push once  dextrose 50% Injectable 12.5 Gram(s) IV Push once  dextrose 50% Injectable 25 Gram(s) IV Push once  epoetin fawad-epbx (RETACRIT) Injectable 33057 Unit(s) IV Push <User Schedule>  glucagon  Injectable 1 milliGRAM(s) IntraMuscular once  heparin   Injectable 5000 Unit(s) SubCutaneous every 12 hours  imipramine 50 milliGRAM(s) Oral daily  insulin glargine Injectable (LANTUS) 5 Unit(s) SubCutaneous at bedtime  insulin lispro (ADMELOG) corrective regimen sliding scale   SubCutaneous every 6 hours  metoprolol tartrate 100 milliGRAM(s) Oral at bedtime  multivitamin 1 Tablet(s) Oral daily  pantoprazole    Tablet 40 milliGRAM(s) Oral before breakfast  risperiDONE   Tablet 0.5 milliGRAM(s) Oral at bedtime  senna 2 Tablet(s) Oral at bedtime  sodium chloride 0.9%. 1000 milliLiter(s) (30 mL/Hr) IV Continuous <Continuous>  sodium zirconium cyclosilicate 10 Gram(s) Oral <User Schedule>    MEDICATIONS  (PRN):  acetaminophen     Tablet .. 650 milliGRAM(s) Oral every 6 hours PRN Temp greater or equal to 38C (100.4F), Mild Pain (1 - 3)  aluminum hydroxide/magnesium hydroxide/simethicone Suspension 30 milliLiter(s) Oral every 4 hours PRN Dyspepsia  dextrose Oral Gel 15 Gram(s) Oral once PRN Blood Glucose LESS THAN 70 milliGRAM(s)/deciliter  melatonin 3 milliGRAM(s) Oral at bedtime PRN Insomnia  ondansetron Injectable 4 milliGRAM(s) IV Push every 8 hours PRN Nausea and/or Vomiting  traMADol 50 milliGRAM(s) Oral four times a day PRN Moderate Pain (4 - 6)    Allergies  latex (Hives)  No Known Drug Allergies    Vital Signs Last 24 Hrs  T(C): 36.6 (10 Mar 2023 09:19), Max: 36.8 (09 Mar 2023 21:29)  T(F): 97.8 (10 Mar 2023 09:19), Max: 98.2 (09 Mar 2023 21:29)  HR: 62 (10 Mar 2023 09:19) (62 - 79)  BP: 138/53 (10 Mar 2023 09:19) (136/74 - 151/66)  BP(mean): --  RR: 18 (10 Mar 2023 09:19) (17 - 18)  SpO2: 95% (10 Mar 2023 09:19) (91% - 97%)    Parameters below as of 10 Mar 2023 09:19  Patient On (Oxygen Delivery Method): room air      I&O's Detail    Physical Exam:  General: NAD, resting comfortably in bed  Pulmonary: Nonlabored breathing, no respiratory distress, diminished at bases  Cardiovascular: Normal S1, S2  Abdominal: soft, NT/ND  Extremities: R hallux amp site with dry eschar, no periwound erythema or edema; 2nd R toe distal tip with dry eschar and mild periwound erythema  Pulses:   Right:                                                                            Left:  FEM [x ]2+ [ ]1+ [ ]doppler                                             FEM [x ]2+ [ ]1+ [ ]doppler    POP [ ]2+ [ ]1+ [ ]doppler                                               POP [ ]2+ [ ]1+ [ ]doppler    DP [ ]2+ [ ]1+ [x ]doppler                                                DP [ ]2+ [ ]1+ [ ]doppler  PT[ ]2+ [ ]1+ [ ]doppler                                                  PT [ ]2+ [ ]1+ [ ]doppler      LABS:                        10.1   9.12  )-----------( 195      ( 10 Mar 2023 08:17 )             31.8     03-10    136  |  97  |  72<H>  ----------------------------<  189<H>  4.6   |  32<H>  |  7.30<H>    Ca    8.9      10 Mar 2023 08:17      CAPILLARY BLOOD GLUCOSE  POCT Blood Glucose.: 197 mg/dL (10 Mar 2023 07:22)  POCT Blood Glucose.: 230 mg/dL (09 Mar 2023 23:59)  POCT Blood Glucose.: 251 mg/dL (09 Mar 2023 21:43)  POCT Blood Glucose.: 236 mg/dL (09 Mar 2023 17:04)  POCT Blood Glucose.: 365 mg/dL (09 Mar 2023 11:34)    Radiology and Additional Studies:

## 2023-03-10 NOTE — PROGRESS NOTE ADULT - SUBJECTIVE AND OBJECTIVE BOX
PROGRESS NOTE  Patient is a 72y old  Female who presents with a chief complaint of admitted for RLE angiogram (10 Mar 2023 10:56)    Chart and available morning labs /imaging are reviewed electronically , urgent issues addressed . More information  is being added upon completion of rounds , when more information is collected and management discussed with consultants , medical staff and social service/case management on the floor   OVERNIGHT  No new issues reported by medical staff . All above noted Patient is resting in a bed comfortably .Confused ,poor mentation .No distress noted     HPI:  72-year-old female with history of A-fib, diabetes, hypertension, hyperlipidemia, end-stage renal disease hemodialysis, CHF, CAD/CABG, PVD sent in from [facility] for admission for right lower extremity angiogram ,patient is scheduled by vascular team for intervention tomorrow and requires cardiology clearance ,labs ,EKG and chest xray .  Patient otherwise feeling well and has no complaints. Last dialysis - yesterday. Patient was recently hospitaliazed with malfunctioning HD fistula , nephrologist contacted . (07 Mar 2023 16:13)    PAST MEDICAL & SURGICAL HISTORY:  HTN (hypertension)      h/o Anxiety attack      Depression      h/o Myocardial infarct 2007      h/o Hepatitis A 1969  currently resolved, no symptoms      Murmur, cardiac      h/o Smoking  quitted 3/2012      Anemia secondary to renal failure      ESRD on dialysis      Falls      Ataxia      Type 2 diabetes mellitus      Peripheral vascular disease, unspecified      CAD (coronary artery disease)      Diabetes      coronary stent 2007      s/p Ovarian cyst removal      s/p surgical removal of benign Skin lesion epigastric area      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 1000 milliLiter(s) (50 mL/Hr) IV Continuous <Continuous>  dextrose 5%. 1000 milliLiter(s) (100 mL/Hr) IV Continuous <Continuous>  dextrose 50% Injectable 25 Gram(s) IV Push once  dextrose 50% Injectable 12.5 Gram(s) IV Push once  dextrose 50% Injectable 25 Gram(s) IV Push once  epoetin fawad-epbx (RETACRIT) Injectable 87269 Unit(s) IV Push <User Schedule>  glucagon  Injectable 1 milliGRAM(s) IntraMuscular once  heparin   Injectable 5000 Unit(s) SubCutaneous every 12 hours  imipramine 50 milliGRAM(s) Oral daily  insulin glargine Injectable (LANTUS) 5 Unit(s) SubCutaneous at bedtime  insulin lispro (ADMELOG) corrective regimen sliding scale   SubCutaneous every 6 hours  metoprolol tartrate 100 milliGRAM(s) Oral at bedtime  multivitamin 1 Tablet(s) Oral daily  pantoprazole    Tablet 40 milliGRAM(s) Oral before breakfast  risperiDONE   Tablet 0.5 milliGRAM(s) Oral at bedtime  senna 2 Tablet(s) Oral at bedtime  sodium chloride 0.9%. 1000 milliLiter(s) (30 mL/Hr) IV Continuous <Continuous>  sodium zirconium cyclosilicate 10 Gram(s) Oral <User Schedule>    MEDICATIONS  (PRN):  acetaminophen     Tablet .. 650 milliGRAM(s) Oral every 6 hours PRN Temp greater or equal to 38C (100.4F), Mild Pain (1 - 3)  aluminum hydroxide/magnesium hydroxide/simethicone Suspension 30 milliLiter(s) Oral every 4 hours PRN Dyspepsia  dextrose Oral Gel 15 Gram(s) Oral once PRN Blood Glucose LESS THAN 70 milliGRAM(s)/deciliter  melatonin 3 milliGRAM(s) Oral at bedtime PRN Insomnia  ondansetron Injectable 4 milliGRAM(s) IV Push every 8 hours PRN Nausea and/or Vomiting  traMADol 50 milliGRAM(s) Oral four times a day PRN Moderate Pain (4 - 6)      OBJECTIVE    T(C): 36.6 (03-10-23 @ 15:33), Max: 36.8 (03-09-23 @ 21:29)  HR: 71 (03-10-23 @ 15:33) (62 - 79)  BP: 145/53 (03-10-23 @ 15:33) (136/74 - 151/70)  RR: 14 (03-10-23 @ 15:33) (14 - 18)  SpO2: 100% (03-10-23 @ 15:33) (93% - 100%)  Wt(kg): --  I&O's Summary        REVIEW OF SYSTEMS:  CONSTITUTIONAL: No fever, weight loss, or fatigue  EYES: No eye pain, visual disturbances, or discharge  ENMT:   No sinus or throat pain  NECK: No pain or stiffness  RESPIRATORY: No cough, wheezing, chills or hemoptysis; No shortness of breath  CARDIOVASCULAR: No chest pain, palpitations, dizziness, or leg swelling  GASTROINTESTINAL: No abdominal pain. No nausea, vomiting; No diarrhea or constipation. No melena or hematochezia.  GENITOURINARY: No dysuria, frequency, hematuria, or incontinence  NEUROLOGICAL: No headaches, memory loss, loss of strength, numbness, or tremors  SKIN: No itching, burning, rashes, or lesions   MUSCULOSKELETAL: No joint pain or swelling; No muscle, back, or extremity pain    PHYSICAL EXAM:  Appearance: NAD. VS past 24 hrs -as above   HEENT:   Moist oral mucosa. Conjunctiva clear b/l.   Neck : supple  Respiratory: Lungs CTAB.  Gastrointestinal:  Soft, nontender. No rebound. No rigidity. BS present	  Cardiovascular: RRR ,S1S2 present  Neurologic: Non-focal. Moving all extremities.  Extremities: No edema. No erythema. No calf tenderness.  Skin: No rashes, No ecchymoses, No cyanosis.	  wounds ,skin lesions-See skin assesment flow sheet   LABS:                        10.1   9.12  )-----------( 195      ( 10 Mar 2023 08:17 )             31.8     03-10    136  |  97  |  72<H>  ----------------------------<  189<H>  4.6   |  32<H>  |  7.30<H>    Ca    8.9      10 Mar 2023 08:17      CAPILLARY BLOOD GLUCOSE      POCT Blood Glucose.: 164 mg/dL (10 Mar 2023 15:54)  POCT Blood Glucose.: 163 mg/dL (10 Mar 2023 11:48)  POCT Blood Glucose.: 197 mg/dL (10 Mar 2023 07:22)  POCT Blood Glucose.: 230 mg/dL (09 Mar 2023 23:59)  POCT Blood Glucose.: 251 mg/dL (09 Mar 2023 21:43)  POCT Blood Glucose.: 236 mg/dL (09 Mar 2023 17:04)          RADIOLOGY & ADDITIONAL TESTS:   reviewed elctronically  ASSESSMENT/PLAN: 	    25 minutes aggregate time was spent on this visit, 50% visit time spent in care co-ordination with other attendings and counselling patient .I have discussed care plan with patient / HCP/family member ,who expressed understanding of problems treatment and their effect and side effects, alternatives in details. I have asked if they have any questions and concerns and appropriately addressed them to best of my ability.

## 2023-03-10 NOTE — DISCHARGE NOTE NURSING/CASE MANAGEMENT/SOCIAL WORK - PATIENT PORTAL LINK FT
You can access the FollowMyHealth Patient Portal offered by [facility] by registering at the following website: [email or web address]. By joining [employer]’s FollowMyHealth portal, you will also be able to view your health information using other applications (apps) compatible with our system.

## 2023-03-10 NOTE — DISCHARGE NOTE NURSING/CASE MANAGEMENT/SOCIAL WORK - NSDCPEFALRISK_GEN_ALL_CORE
For information on Fall & Injury Prevention, visit: [email or web address] OR  [email or web address] OR  https://www.cdc.gov/steadi/patient.html

## 2023-03-10 NOTE — PROGRESS NOTE ADULT - NSPROGADDITIONALINFOA_GEN_ALL_CORE
Patient is optimized from medical and cardiovascular standpoint to proceed with planned vascular interventions( angiogram/angioplasty)  with routine hemodynamic monitoring.

## 2023-03-11 ENCOUNTER — TRANSCRIPTION ENCOUNTER (OUTPATIENT)
Age: 73
End: 2023-03-11

## 2023-03-11 VITALS
SYSTOLIC BLOOD PRESSURE: 137 MMHG | RESPIRATION RATE: 18 BRPM | HEART RATE: 84 BPM | OXYGEN SATURATION: 92 % | DIASTOLIC BLOOD PRESSURE: 67 MMHG | TEMPERATURE: 99 F

## 2023-03-11 LAB
ANION GAP SERPL CALC-SCNC: 10 MMOL/L — SIGNIFICANT CHANGE UP (ref 5–17)
BUN SERPL-MCNC: 86 MG/DL — HIGH (ref 7–23)
CALCIUM SERPL-MCNC: 8.8 MG/DL — SIGNIFICANT CHANGE UP (ref 8.5–10.1)
CHLORIDE SERPL-SCNC: 97 MMOL/L — SIGNIFICANT CHANGE UP (ref 96–108)
CO2 SERPL-SCNC: 29 MMOL/L — SIGNIFICANT CHANGE UP (ref 22–31)
CREAT SERPL-MCNC: 8.7 MG/DL — HIGH (ref 0.5–1.3)
EGFR: 4 ML/MIN/1.73M2 — LOW
GLUCOSE SERPL-MCNC: 174 MG/DL — HIGH (ref 70–99)
HCT VFR BLD CALC: 30.9 % — LOW (ref 34.5–45)
HGB BLD-MCNC: 10.2 G/DL — LOW (ref 11.5–15.5)
MCHC RBC-ENTMCNC: 31.6 PG — SIGNIFICANT CHANGE UP (ref 27–34)
MCHC RBC-ENTMCNC: 33 GM/DL — SIGNIFICANT CHANGE UP (ref 32–36)
MCV RBC AUTO: 95.7 FL — SIGNIFICANT CHANGE UP (ref 80–100)
NRBC # BLD: 0 /100 WBCS — SIGNIFICANT CHANGE UP (ref 0–0)
PLATELET # BLD AUTO: 222 K/UL — SIGNIFICANT CHANGE UP (ref 150–400)
POTASSIUM SERPL-MCNC: 4.8 MMOL/L — SIGNIFICANT CHANGE UP (ref 3.5–5.3)
POTASSIUM SERPL-SCNC: 4.8 MMOL/L — SIGNIFICANT CHANGE UP (ref 3.5–5.3)
RBC # BLD: 3.23 M/UL — LOW (ref 3.8–5.2)
RBC # FLD: 12.8 % — SIGNIFICANT CHANGE UP (ref 10.3–14.5)
SARS-COV-2 RNA SPEC QL NAA+PROBE: SIGNIFICANT CHANGE UP
SODIUM SERPL-SCNC: 136 MMOL/L — SIGNIFICANT CHANGE UP (ref 135–145)
WBC # BLD: 9.94 K/UL — SIGNIFICANT CHANGE UP (ref 3.8–10.5)
WBC # FLD AUTO: 9.94 K/UL — SIGNIFICANT CHANGE UP (ref 3.8–10.5)

## 2023-03-11 PROCEDURE — 93925 LOWER EXTREMITY STUDY: CPT

## 2023-03-11 PROCEDURE — C1769: CPT

## 2023-03-11 PROCEDURE — 82962 GLUCOSE BLOOD TEST: CPT

## 2023-03-11 PROCEDURE — 86900 BLOOD TYPING SEROLOGIC ABO: CPT

## 2023-03-11 PROCEDURE — 85027 COMPLETE CBC AUTOMATED: CPT

## 2023-03-11 PROCEDURE — 86901 BLOOD TYPING SEROLOGIC RH(D): CPT

## 2023-03-11 PROCEDURE — 93005 ELECTROCARDIOGRAM TRACING: CPT

## 2023-03-11 PROCEDURE — 87635 SARS-COV-2 COVID-19 AMP PRB: CPT

## 2023-03-11 PROCEDURE — U0003: CPT

## 2023-03-11 PROCEDURE — 99232 SBSQ HOSP IP/OBS MODERATE 35: CPT

## 2023-03-11 PROCEDURE — C1887: CPT

## 2023-03-11 PROCEDURE — 71045 X-RAY EXAM CHEST 1 VIEW: CPT

## 2023-03-11 PROCEDURE — 86850 RBC ANTIBODY SCREEN: CPT

## 2023-03-11 PROCEDURE — 85610 PROTHROMBIN TIME: CPT

## 2023-03-11 PROCEDURE — 80048 BASIC METABOLIC PNL TOTAL CA: CPT

## 2023-03-11 PROCEDURE — 97162 PT EVAL MOD COMPLEX 30 MIN: CPT

## 2023-03-11 PROCEDURE — 85730 THROMBOPLASTIN TIME PARTIAL: CPT

## 2023-03-11 PROCEDURE — 92610 EVALUATE SWALLOWING FUNCTION: CPT

## 2023-03-11 PROCEDURE — 76000 FLUOROSCOPY <1 HR PHYS/QHP: CPT

## 2023-03-11 PROCEDURE — C1894: CPT

## 2023-03-11 PROCEDURE — 99261: CPT

## 2023-03-11 PROCEDURE — 99285 EMERGENCY DEPT VISIT HI MDM: CPT | Mod: 25

## 2023-03-11 PROCEDURE — 36415 COLL VENOUS BLD VENIPUNCTURE: CPT

## 2023-03-11 PROCEDURE — 99233 SBSQ HOSP IP/OBS HIGH 50: CPT

## 2023-03-11 PROCEDURE — 85025 COMPLETE CBC W/AUTO DIFF WBC: CPT

## 2023-03-11 PROCEDURE — 80053 COMPREHEN METABOLIC PANEL: CPT

## 2023-03-11 PROCEDURE — U0005: CPT

## 2023-03-11 RX ORDER — INSULIN GLARGINE 100 [IU]/ML
20 INJECTION, SOLUTION SUBCUTANEOUS
Qty: 0 | Refills: 0 | DISCHARGE

## 2023-03-11 RX ADMIN — PANTOPRAZOLE SODIUM 40 MILLIGRAM(S): 20 TABLET, DELAYED RELEASE ORAL at 06:04

## 2023-03-11 RX ADMIN — Medication 2: at 06:36

## 2023-03-11 RX ADMIN — Medication 50 MILLIGRAM(S): at 13:21

## 2023-03-11 RX ADMIN — Medication 0.1 MILLIGRAM(S): at 06:05

## 2023-03-11 RX ADMIN — Medication 1 TABLET(S): at 13:20

## 2023-03-11 RX ADMIN — Medication 2: at 00:08

## 2023-03-11 RX ADMIN — ERYTHROPOIETIN 10000 UNIT(S): 10000 INJECTION, SOLUTION INTRAVENOUS; SUBCUTANEOUS at 12:03

## 2023-03-11 RX ADMIN — Medication 81 MILLIGRAM(S): at 13:21

## 2023-03-11 NOTE — DISCHARGE NOTE PROVIDER - NSDCMRMEDTOKEN_GEN_ALL_CORE_FT
acetaminophen 325 mg oral tablet: 2 tab(s) orally every 6 hours, As needed, Temp greater or equal to 38C (100.4F), Mild Pain (1 - 3)  Admelog SoloStar 100 units/mL injectable solution: injectable 3 times a day (before meals)sliding scale   aspirin 81 mg oral delayed release tablet: 1 tab(s) orally once a day (at bedtime)  atorvastatin 40 mg oral tablet: 1 tab(s) orally once a day (at bedtime)  cloNIDine 0.1 mg oral tablet: 1 tab(s) orally 2 times a day  imipramine 50 mg oral tablet: 1 tab(s) orally once a day  Lokelma 10 g oral powder for reconstitution: 10 gram(s) orally 2 times a week on Wed and Sat   Melatonin 3 mg oral tablet: 1 tab(s) orally once a day (at bedtime), As Needed  metoprolol tartrate 100 mg oral tablet: 1 tab(s) orally once a day (at bedtime)  Nephro-Alfie oral tablet: 1 tab(s) orally once a day  PANTOPRAZOLE 40MG DR TAB: 1 tab(s) orally once a day  risperiDONE 0.5 mg oral tablet: 1 tab(s) orally once a day (at bedtime)  Senna 8.6 mg oral tablet: 1 tab(s) orally once a day (at bedtime)  silver sulfADIAZINE 1% topical cream: Apply topically to affected area once a day

## 2023-03-11 NOTE — PROGRESS NOTE ADULT - SUBJECTIVE AND OBJECTIVE BOX
Patient is a 72y old  Female who presents with a chief complaint of admitted for RLE angiogram (11 Mar 2023 07:20)  S/P RLE angiogram(diagnostic)  Pt without complaints this am    MEDICATIONS  (STANDING):  aspirin enteric coated 81 milliGRAM(s) Oral daily  atorvastatin 40 milliGRAM(s) Oral at bedtime  cloNIDine 0.1 milliGRAM(s) Oral two times a day  dextrose 5%. 1000 milliLiter(s) (50 mL/Hr) IV Continuous <Continuous>  dextrose 5%. 1000 milliLiter(s) (100 mL/Hr) IV Continuous <Continuous>  dextrose 50% Injectable 25 Gram(s) IV Push once  dextrose 50% Injectable 12.5 Gram(s) IV Push once  dextrose 50% Injectable 25 Gram(s) IV Push once  epoetin fawad-epbx (RETACRIT) Injectable 05557 Unit(s) IV Push <User Schedule>  glucagon  Injectable 1 milliGRAM(s) IntraMuscular once  heparin   Injectable 5000 Unit(s) SubCutaneous every 12 hours  imipramine 50 milliGRAM(s) Oral daily  insulin glargine Injectable (LANTUS) 5 Unit(s) SubCutaneous at bedtime  insulin lispro (ADMELOG) corrective regimen sliding scale   SubCutaneous every 6 hours  metoprolol tartrate 100 milliGRAM(s) Oral at bedtime  multivitamin 1 Tablet(s) Oral daily  pantoprazole    Tablet 40 milliGRAM(s) Oral before breakfast  risperiDONE   Tablet 0.5 milliGRAM(s) Oral at bedtime  senna 2 Tablet(s) Oral at bedtime  sodium zirconium cyclosilicate 10 Gram(s) Oral <User Schedule>    MEDICATIONS  (PRN):  acetaminophen     Tablet .. 650 milliGRAM(s) Oral every 6 hours PRN Temp greater or equal to 38C (100.4F), Mild Pain (1 - 3)  aluminum hydroxide/magnesium hydroxide/simethicone Suspension 30 milliLiter(s) Oral every 4 hours PRN Dyspepsia  dextrose Oral Gel 15 Gram(s) Oral once PRN Blood Glucose LESS THAN 70 milliGRAM(s)/deciliter  melatonin 3 milliGRAM(s) Oral at bedtime PRN Insomnia  ondansetron Injectable 4 milliGRAM(s) IV Push every 8 hours PRN Nausea and/or Vomiting  traMADol 50 milliGRAM(s) Oral four times a day PRN Moderate Pain (4 - 6)    Allergies  latex (Hives)  No Known Drug Allergies    Vital Signs Last 24 Hrs  T(C): 36.7 (11 Mar 2023 05:15), Max: 36.8 (10 Mar 2023 22:00)  T(F): 98.1 (11 Mar 2023 05:15), Max: 98.2 (10 Mar 2023 22:00)  HR: 70 (11 Mar 2023 05:15) (62 - 71)  BP: 150/64 (11 Mar 2023 05:15) (138/53 - 189/76)  BP(mean): --  RR: 17 (11 Mar 2023 05:15) (10 - 18)  SpO2: 92% (11 Mar 2023 05:15) (92% - 100%)    Parameters below as of 11 Mar 2023 05:15  Patient On (Oxygen Delivery Method): room air      I&O's Detail    Physical Exam:  General: NAD, resting comfortably in bed  Pulmonary: Nonlabored breathing, no respiratory distress, diminished at bases  Cardiovascular: Normal S1, S2  Abdominal: soft, NT/ND  Extremities: R hallux amp site with dry eschar, no periwound erythema or edema; 2nd R toe distal tip with dry eschar and mild periwound erythema. L FA access site without hematoma or ecchymosis  Pulses:   Right:                                                                            Left:  FEM [x ]2+ [ ]1+ [ ]doppler                                             FEM [x ]2+ [ ]1+ [ ]doppler    POP [ ]2+ [ ]1+ [ ]doppler                                               POP [ ]2+ [ ]1+ [ ]doppler    DP [ ]2+ [ ]1+ [x ]doppler                                                DP [ ]2+ [ ]1+ [ ]doppler  PT[ ]2+ [ ]1+ [ ]doppler                                                  PT [ ]2+ [ ]1+ [ ]doppler      LABS:                        10.2   9.94  )-----------( 222      ( 11 Mar 2023 06:48 )             30.9     03-11    136  |  97  |  86<H>  ----------------------------<  174<H>  4.8   |  29  |  8.70<H>    Ca    8.8      11 Mar 2023 06:48        CAPILLARY BLOOD GLUCOSE  POCT Blood Glucose.: 164 mg/dL (11 Mar 2023 06:14)  POCT Blood Glucose.: 200 mg/dL (10 Mar 2023 23:53)  POCT Blood Glucose.: 129 mg/dL (10 Mar 2023 21:56)  POCT Blood Glucose.: 150 mg/dL (10 Mar 2023 17:55)  POCT Blood Glucose.: 164 mg/dL (10 Mar 2023 15:54)  POCT Blood Glucose.: 163 mg/dL (10 Mar 2023 11:48)    Radiology and Additional Studies:

## 2023-03-11 NOTE — PROGRESS NOTE ADULT - SUBJECTIVE AND OBJECTIVE BOX
Patient is a 72y Female whom presented to the hospital with esrd on hd     PAST MEDICAL & SURGICAL HISTORY:  HTN (hypertension)      h/o Anxiety attack      Depression      h/o Myocardial infarct 2007      h/o Hepatitis A 1969  currently resolved, no symptoms      Murmur, cardiac      h/o Smoking  quitted 3/2012      Anemia secondary to renal failure      ESRD on dialysis      Falls      Ataxia      Type 2 diabetes mellitus      Peripheral vascular disease, unspecified      CAD (coronary artery disease)      Diabetes      coronary stent 2007      s/p Ovarian cyst removal      s/p surgical removal of benign Skin lesion epigastric area      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 1000 milliLiter(s) (50 mL/Hr) IV Continuous <Continuous>  dextrose 5%. 1000 milliLiter(s) (100 mL/Hr) IV Continuous <Continuous>  dextrose 50% Injectable 25 Gram(s) IV Push once  dextrose 50% Injectable 12.5 Gram(s) IV Push once  dextrose 50% Injectable 25 Gram(s) IV Push once  glucagon  Injectable 1 milliGRAM(s) IntraMuscular once  imipramine 50 milliGRAM(s) Oral daily  insulin glargine Injectable (LANTUS) 5 Unit(s) SubCutaneous at bedtime  insulin lispro (ADMELOG) corrective regimen sliding scale   SubCutaneous three times a day before meals  insulin lispro (ADMELOG) corrective regimen sliding scale   SubCutaneous at bedtime  metoprolol tartrate 100 milliGRAM(s) Oral at bedtime  multivitamin 1 Tablet(s) Oral daily  pantoprazole    Tablet 40 milliGRAM(s) Oral before breakfast  senna 2 Tablet(s) Oral at bedtime      Allergies    latex (Hives)  No Known Drug Allergies    Intolerances        SOCIAL HISTORY:  Denies ETOh,Smoking,     FAMILY HISTORY:  FH: myocardial infarction (Father)    FH: myocardial infarction (Father)    Family history of type 2 diabetes mellitus in brother (Sibling)        REVIEW OF SYSTEMS:    CONSTITUTIONAL: No weakness, fevers or chills  RESPIRATORY: No cough, wheezing, hemoptysis; No shortness of breath  CARDIOVASCULAR: No chest pain or palpitations  GASTROINTESTINAL: No abdominal or epigastric pain. No nausea, vomiting,     No diarrhea or constipation. No melena   GENITOURINARY: No dysuria, frequency or hematuria  NEUROLOGICAL: No numbness or weakness  SKIN: dry                            10.2   9.94  )-----------( 222      ( 11 Mar 2023 06:48 )             30.9       CBC Full  -  ( 11 Mar 2023 06:48 )  WBC Count : 9.94 K/uL  RBC Count : 3.23 M/uL  Hemoglobin : 10.2 g/dL  Hematocrit : 30.9 %  Platelet Count - Automated : 222 K/uL  Mean Cell Volume : 95.7 fl  Mean Cell Hemoglobin : 31.6 pg  Mean Cell Hemoglobin Concentration : 33.0 gm/dL  Auto Neutrophil # : x  Auto Lymphocyte # : x  Auto Monocyte # : x  Auto Eosinophil # : x  Auto Basophil # : x  Auto Neutrophil % : x  Auto Lymphocyte % : x  Auto Monocyte % : x  Auto Eosinophil % : x  Auto Basophil % : x      03-11    136  |  97  |  86<H>  ----------------------------<  174<H>  4.8   |  29  |  8.70<H>    Ca    8.8      11 Mar 2023 06:48        CAPILLARY BLOOD GLUCOSE      POCT Blood Glucose.: 149 mg/dL (11 Mar 2023 12:29)  POCT Blood Glucose.: 153 mg/dL (11 Mar 2023 11:41)  POCT Blood Glucose.: 164 mg/dL (11 Mar 2023 06:14)  POCT Blood Glucose.: 200 mg/dL (10 Mar 2023 23:53)  POCT Blood Glucose.: 129 mg/dL (10 Mar 2023 21:56)  POCT Blood Glucose.: 150 mg/dL (10 Mar 2023 17:55)  POCT Blood Glucose.: 164 mg/dL (10 Mar 2023 15:54)      Vital Signs Last 24 Hrs  T(C): 37.1 (11 Mar 2023 14:20), Max: 37.1 (11 Mar 2023 14:20)  T(F): 98.8 (11 Mar 2023 14:20), Max: 98.8 (11 Mar 2023 14:20)  HR: 84 (11 Mar 2023 14:20) (62 - 84)  BP: 137/67 (11 Mar 2023 14:20) (137/67 - 189/76)  BP(mean): --  RR: 18 (11 Mar 2023 14:20) (10 - 18)  SpO2: 92% (11 Mar 2023 14:20) (92% - 100%)    Parameters below as of 11 Mar 2023 14:20  Patient On (Oxygen Delivery Method): room air                       PHYSICAL EXAM:    Constitutional: NAD  HEENT: conjunctive   clear   Neck:  No JVD  Respiratory: CTAB  Cardiovascular: S1 and S2  Gastrointestinal: BS+, soft, NT/ND  Extremities: No peripheral edema  Neurological: A/O x 3, no focal deficits  Psychiatric: Normal mood, normal affect  : No Renteria  Skin: No rashes  Access: pos right arm  fistula   partial ray amputation of right great toe

## 2023-03-11 NOTE — DISCHARGE NOTE PROVIDER - HOSPITAL COURSE
·  Problem: PAD (peripheral artery disease).   ·  Plan: Seen  by vascular teem 02/24 during previous admission PAD S/P R-->L bifem-fem BK pop bypass, recent fempop bypass (6/21/2022), and partial ray amputation right great toe (6/22/2022), Further imaging /workup as per vacular team ,booked for angiogram 03/10/23-Patient is optimized from medical and cardiovascular standpoint to proceed with planned vascular interventions( angiogram/angioplasty)  with routine hemodynamic monitoring. 03/11- S/P RLE angiogram with incomplete visualization distal runoff  Pt with R fem-Pop bypass and difficult angiogram  Will obtain RLE arterial duplex for further evaluation  If sig below knee disease may need distal access approach for angiogram Patient is seen in HD unit , she is aware of plan and adamantly refusing to stay in a hospital to continue workup ,requests to go back to [facility] to see her  .Son Jeronimo is aware and is in agreement ,he requests further testing to be done as outpatient ,doesn't want inpatient admission if possible .D/w vascular PA -ok to discharge with outpatient f/up ,d/w med staff .RN ,SW.    Problem/Plan - 2:  ·  Problem: HTN (hypertension).   ·  Plan: continue home medications.    Problem/Plan - 3:  ·  Problem: DM2 (diabetes mellitus, type 2).   ·  Plan: Accuchecks monitoring and insulin corrective regimen  sliding scale coverage with short acting inslulin, add longacting insulin as needed ,no concentrated sweets diet, serial labs ,HbA1C,education.    Problem/Plan - 4:  ·  Problem: ESRD on hemodialysis.   ·  Plan: HD as per nephrologist.    Problem/Plan - 5:  ·  Problem: Hyperkalemia.   ·  Plan: on lokelma biw as per nephrologist recommendations.    Problem/Plan - 6:  ·  Problem: Afib.   ·  Plan: continue home medications ,cardi clearance requested.    Problem/Plan - 7:  ·  Problem: CAD (coronary artery disease).   ·  Plan: continue home medications ,cardi clearance requested.    Problem/Plan - 8:  ·  Problem: Ataxia.   ·  Plan: PT/OT ,fall precautions.    Problem/Plan - 9:  ·  Problem: MDD (major depressive disorder).   ·  Plan: continue home medications.    Problem/Plan - 10:  ·  Problem: Prophylactic measure.   ·  Plan; Gastrointestinal stress ulcer prophylaxis and DVT prophylaxis administered.

## 2023-03-11 NOTE — DISCHARGE NOTE PROVIDER - CARE PROVIDER_API CALL
Justine Rose)  Vascular Surgery  [address] [place] [address]  Phone: [phone]  Fax: [phone]  Follow Up Time: 1 week

## 2023-03-11 NOTE — PROGRESS NOTE ADULT - SUBJECTIVE AND OBJECTIVE BOX
Patient is a 72y Female with a known history of :  PAD (peripheral artery disease) [I73.9]    HTN (hypertension) [I10]    DM2 (diabetes mellitus, type 2) [E11.9]    ESRD on hemodialysis [N18.6]    Afib [I48.91]    CAD (coronary artery disease) [I25.10]    Prophylactic measure [Z29.9]    MDD (major depressive disorder) [F32.9]    Ataxia [R27.0]    Hyperkalemia [E87.5]      HPI:  72-year-old female with history of A-fib, diabetes, hypertension, hyperlipidemia, end-stage renal disease hemodialysis, CHF, CAD/CABG, PVD sent in from [facility] for admission for right lower extremity angiogram ,patient is scheduled by vascular team for intervention tomorrow and requires cardiology clearance ,labs ,EKG and chest xray .  Patient otherwise feeling well and has no complaints. Last dialysis - yesterday. Patient was recently hospitaliazed with malfunctioning HD fistula , nephrologist contacted . (07 Mar 2023 16:13)      REVIEW OF SYSTEMS:    CONSTITUTIONAL: No fever, weight loss, or fatigue  EYES: No eye pain, visual disturbances, or discharge  ENMT:  No difficulty hearing, tinnitus, vertigo; No sinus or throat pain  NECK: No pain or stiffness  BREASTS: No pain, masses, or nipple discharge  RESPIRATORY: No cough, wheezing, chills or hemoptysis; No shortness of breath  CARDIOVASCULAR: No chest pain, palpitations, dizziness, or leg swelling  GASTROINTESTINAL: No abdominal or epigastric pain. No nausea, vomiting, or hematemesis; No diarrhea or constipation. No melena or hematochezia.  GENITOURINARY: No dysuria, frequency, hematuria, or incontinence  NEUROLOGICAL: No headaches, memory loss, loss of strength, numbness, or tremors  SKIN: No itching, burning, rashes, or lesions   LYMPH NODES: No enlarged glands  ENDOCRINE: No heat or cold intolerance; No hair loss  MUSCULOSKELETAL: No joint pain or swelling; No muscle, back, or extremity pain  PSYCHIATRIC: No depression, anxiety, mood swings, or difficulty sleeping  HEME/LYMPH: No easy bruising, or bleeding gums  ALLERGY AND IMMUNOLOGIC: No hives or eczema    MEDICATIONS  (STANDING):  aspirin enteric coated 81 milliGRAM(s) Oral daily  atorvastatin 40 milliGRAM(s) Oral at bedtime  cloNIDine 0.1 milliGRAM(s) Oral two times a day  dextrose 5%. 1000 milliLiter(s) (50 mL/Hr) IV Continuous <Continuous>  dextrose 5%. 1000 milliLiter(s) (100 mL/Hr) IV Continuous <Continuous>  dextrose 50% Injectable 25 Gram(s) IV Push once  dextrose 50% Injectable 12.5 Gram(s) IV Push once  dextrose 50% Injectable 25 Gram(s) IV Push once  epoetin fawad-epbx (RETACRIT) Injectable 50568 Unit(s) IV Push <User Schedule>  glucagon  Injectable 1 milliGRAM(s) IntraMuscular once  heparin   Injectable 5000 Unit(s) SubCutaneous every 12 hours  imipramine 50 milliGRAM(s) Oral daily  insulin glargine Injectable (LANTUS) 5 Unit(s) SubCutaneous at bedtime  insulin lispro (ADMELOG) corrective regimen sliding scale   SubCutaneous every 6 hours  metoprolol tartrate 100 milliGRAM(s) Oral at bedtime  multivitamin 1 Tablet(s) Oral daily  pantoprazole    Tablet 40 milliGRAM(s) Oral before breakfast  risperiDONE   Tablet 0.5 milliGRAM(s) Oral at bedtime  senna 2 Tablet(s) Oral at bedtime  sodium chloride 0.9%. 1000 milliLiter(s) (5 mL/Hr) IV Continuous <Continuous>  sodium zirconium cyclosilicate 10 Gram(s) Oral <User Schedule>    MEDICATIONS  (PRN):  acetaminophen     Tablet .. 650 milliGRAM(s) Oral every 6 hours PRN Temp greater or equal to 38C (100.4F), Mild Pain (1 - 3)  aluminum hydroxide/magnesium hydroxide/simethicone Suspension 30 milliLiter(s) Oral every 4 hours PRN Dyspepsia  dextrose Oral Gel 15 Gram(s) Oral once PRN Blood Glucose LESS THAN 70 milliGRAM(s)/deciliter  fentaNYL    Injectable 25 MICROGram(s) IV Push every 15 minutes PRN Severe Pain (7 - 10)  melatonin 3 milliGRAM(s) Oral at bedtime PRN Insomnia  ondansetron Injectable 4 milliGRAM(s) IV Push every 8 hours PRN Nausea and/or Vomiting  ondansetron Injectable 4 milliGRAM(s) IV Push once PRN Nausea and/or Vomiting  traMADol 50 milliGRAM(s) Oral four times a day PRN Moderate Pain (4 - 6)      ALLERGIES: latex (Hives)  No Known Drug Allergies      FAMILY HISTORY:  FH: myocardial infarction (Father)    FH: myocardial infarction (Father)    Family history of type 2 diabetes mellitus in brother (Sibling)        PHYSICAL EXAMINATION:  -----------------------------  T(C): 36.7 (03-11-23 @ 05:15), Max: 36.8 (03-10-23 @ 22:00)  HR: 70 (03-11-23 @ 05:15) (62 - 71)  BP: 150/64 (03-11-23 @ 05:15) (138/53 - 189/76)  RR: 17 (03-11-23 @ 05:15) (10 - 18)  SpO2: 92% (03-11-23 @ 05:15) (92% - 100%)  Wt(kg): --        VITALS  T(C): 36.7 (03-11-23 @ 05:15), Max: 36.8 (03-10-23 @ 22:00)  HR: 70 (03-11-23 @ 05:15) (62 - 71)  BP: 150/64 (03-11-23 @ 05:15) (138/53 - 189/76)  RR: 17 (03-11-23 @ 05:15) (10 - 18)  SpO2: 92% (03-11-23 @ 05:15) (92% - 100%)    Constitutional: well developed, normal appearance, well groomed, well nourished, no deformities and no acute distress.   Eyes: the conjunctiva exhibited no abnormalities and the eyelids demonstrated no xanthelasmas.   HEENT: normal oral mucosa, no oral pallor and no oral cyanosis.   Neck: normal jugular venous A waves present, normal jugular venous V waves present and no jugular venous wilson A waves.   Pulmonary: no respiratory distress, normal respiratory rhythm and effort, no accessory muscle use and lungs were clear to auscultation bilaterally.   Cardiovascular: heart rate and rhythm were normal, normal S1 and S2 and no murmur, gallop, rub, heave or thrill are present.   Abdomen: soft, non-tender, no hepato-splenomegaly and no abdominal mass palpated.   Musculoskeletal: the gait could not be assessed..   Extremities: no clubbing of the fingernails, no localized cyanosis, no petechial hemorrhages and no ischemic changes.   Skin: normal skin color and pigmentation, no rash, no venous stasis, no skin lesions, no skin ulcer and no xanthoma was observed.   Psychiatric: oriented to person, place, and time, the affect was normal, the mood was normal and not feeling anxious.     LABS:   --------  03-10    136  |  97  |  72<H>  ----------------------------<  189<H>  4.6   |  32<H>  |  7.30<H>    Ca    8.9      10 Mar 2023 08:17                           10.1   9.12  )-----------( 195      ( 10 Mar 2023 08:17 )             31.8                 RADIOLOGY:  -----------------    ECG:     ECHO:

## 2023-03-11 NOTE — PROGRESS NOTE ADULT - SUBJECTIVE AND OBJECTIVE BOX
PROGRESS NOTE  Patient is a 72y old  Female who presents with a chief complaint of admitted for RLE angiogram (11 Mar 2023 09:18)  Chart and available morning labs /imaging are reviewed electronically , urgent issues addressed . More information  is being added upon completion of rounds , when more information is collected and management discussed with consultants , medical staff and social service/case management on the floor     OVERNIGHT  No new issues reported by medical staff . All above noted Patient is resting in a bed comfortably .Confused ,poor mentation .No distress noted   S/P RLE angiogram with incomplete visualization distal runoff  Pt with R fem-Pop bypass and difficult angiogram  Will obtain RLE arterial duplex for further evaluation  If sig below knee disease may need distal access approach for angiogram Patient is seen in HD unit , she is aware of plan and adamantly refusing to stay in a hospital to continue workup ,requests to go back to [facility] to see her  .Son Jeronimo is aware and is in agreement ,he requests further testing to be done as outpatient ,doesn't want inpatient admission if possible .D/w vascular PA -ok to discharge with outpatient f/up ,d/w med staff .RN ,SW  HPI:  72-year-old female with history of A-fib, diabetes, hypertension, hyperlipidemia, end-stage renal disease hemodialysis, CHF, CAD/CABG, PVD sent in from [facility] for admission for right lower extremity angiogram ,patient is scheduled by vascular team for intervention tomorrow and requires cardiology clearance ,labs ,EKG and chest xray .  Patient otherwise feeling well and has no complaints. Last dialysis - yesterday. Patient was recently hospitaliazed with malfunctioning HD fistula , nephrologist contacted . (07 Mar 2023 16:13)    PAST MEDICAL & SURGICAL HISTORY:  HTN (hypertension)      h/o Anxiety attack      Depression      h/o Myocardial infarct 2007      h/o Hepatitis A 1969  currently resolved, no symptoms      Murmur, cardiac      h/o Smoking  quitted 3/2012      Anemia secondary to renal failure      ESRD on dialysis      Falls      Ataxia      Type 2 diabetes mellitus      Peripheral vascular disease, unspecified      CAD (coronary artery disease)      Diabetes      coronary stent 2007      s/p Ovarian cyst removal      s/p surgical removal of benign Skin lesion epigastric area      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 1000 milliLiter(s) (50 mL/Hr) IV Continuous <Continuous>  dextrose 5%. 1000 milliLiter(s) (100 mL/Hr) IV Continuous <Continuous>  dextrose 50% Injectable 25 Gram(s) IV Push once  dextrose 50% Injectable 12.5 Gram(s) IV Push once  dextrose 50% Injectable 25 Gram(s) IV Push once  epoetin fawad-epbx (RETACRIT) Injectable 21894 Unit(s) IV Push <User Schedule>  glucagon  Injectable 1 milliGRAM(s) IntraMuscular once  heparin   Injectable 5000 Unit(s) SubCutaneous every 12 hours  imipramine 50 milliGRAM(s) Oral daily  insulin glargine Injectable (LANTUS) 5 Unit(s) SubCutaneous at bedtime  insulin lispro (ADMELOG) corrective regimen sliding scale   SubCutaneous every 6 hours  metoprolol tartrate 100 milliGRAM(s) Oral at bedtime  multivitamin 1 Tablet(s) Oral daily  pantoprazole    Tablet 40 milliGRAM(s) Oral before breakfast  risperiDONE   Tablet 0.5 milliGRAM(s) Oral at bedtime  senna 2 Tablet(s) Oral at bedtime  sodium zirconium cyclosilicate 10 Gram(s) Oral <User Schedule>    MEDICATIONS  (PRN):  acetaminophen     Tablet .. 650 milliGRAM(s) Oral every 6 hours PRN Temp greater or equal to 38C (100.4F), Mild Pain (1 - 3)  aluminum hydroxide/magnesium hydroxide/simethicone Suspension 30 milliLiter(s) Oral every 4 hours PRN Dyspepsia  dextrose Oral Gel 15 Gram(s) Oral once PRN Blood Glucose LESS THAN 70 milliGRAM(s)/deciliter  melatonin 3 milliGRAM(s) Oral at bedtime PRN Insomnia  ondansetron Injectable 4 milliGRAM(s) IV Push every 8 hours PRN Nausea and/or Vomiting  traMADol 50 milliGRAM(s) Oral four times a day PRN Moderate Pain (4 - 6)      OBJECTIVE    T(C): 36.9 (03-11-23 @ 09:35), Max: 36.9 (03-11-23 @ 09:35)  HR: 74 (03-11-23 @ 09:35) (62 - 74)  BP: 138/57 (03-11-23 @ 09:35) (138/57 - 189/76)  RR: 18 (03-11-23 @ 09:35) (10 - 18)  SpO2: 98% (03-11-23 @ 09:35) (92% - 100%)  Wt(kg): --  I&O's Summary    11 Mar 2023 06:01  -  11 Mar 2023 11:05  --------------------------------------------------------  IN: 240 mL / OUT: 0 mL / NET: 240 mL            PHYSICAL EXAM:  Appearance: NAD. VS past 24 hrs -as above   HEENT:   Moist oral mucosa. Conjunctiva clear b/l.   Neck : supple  Respiratory: Lungs CTAB.  Gastrointestinal:  Soft, nontender. No rebound. No rigidity. BS present	  Cardiovascular: RRR ,S1S2 present  Neurologic: Non-focal. Moving all extremities.  Extremities: No edema. No erythema. No calf tenderness.  Skin: No rashes, No ecchymoses, No cyanosis.	  wounds ,skin lesions-See skin assesment flow sheet   LABS:                        10.2   9.94  )-----------( 222      ( 11 Mar 2023 06:48 )             30.9     03-11    136  |  97  |  86<H>  ----------------------------<  174<H>  4.8   |  29  |  8.70<H>    Ca    8.8      11 Mar 2023 06:48      CAPILLARY BLOOD GLUCOSE      POCT Blood Glucose.: 164 mg/dL (11 Mar 2023 06:14)  POCT Blood Glucose.: 200 mg/dL (10 Mar 2023 23:53)  POCT Blood Glucose.: 129 mg/dL (10 Mar 2023 21:56)  POCT Blood Glucose.: 150 mg/dL (10 Mar 2023 17:55)  POCT Blood Glucose.: 164 mg/dL (10 Mar 2023 15:54)  POCT Blood Glucose.: 163 mg/dL (10 Mar 2023 11:48)          RADIOLOGY & ADDITIONAL TESTS:   reviewed elctronically  ASSESSMENT/PLAN: 	  Patient was seen and examined on a day of discharge . Plan of care , discharge medications and recommendations discussed with consultants and clearance for discharge obtained .Social service , case management  and medical staff are aware of plan. Family is notified. Discharge summary  is  prepared electronically-see separate document prepared by me .75minutes spent on this visit, 50% visit time spent in care co-ordination with other attendings and counselling patient  I have discussed care plan with patient and HCP,expressed understanding of problems treatment and their effect and side effects, alternatives in detail,I have asked if they have any questions and concerns and appropriately addressed them to best of my ability

## 2023-03-11 NOTE — PROGRESS NOTE ADULT - NUTRITIONAL ASSESSMENT
9.6    6.73  )-----------( 159      ( 08 Mar 2023 07:33 )             29.2       CBC Full  -  ( 08 Mar 2023 07:33 )  WBC Count : 6.73 K/uL  RBC Count : 3.11 M/uL  Hemoglobin : 9.6 g/dL  Hematocrit : 29.2 %  Platelet Count - Automated : 159 K/uL  Mean Cell Volume : 93.9 fl  Mean Cell Hemoglobin : 30.9 pg  Mean Cell Hemoglobin Concentration : 32.9 gm/dL  Auto Neutrophil # : 4.26 K/uL  Auto Lymphocyte # : 1.00 K/uL  Auto Monocyte # : 0.85 K/uL  Auto Eosinophil # : 0.48 K/uL  Auto Basophil # : 0.05 K/uL  Auto Neutrophil % : 63.4 %  Auto Lymphocyte % : 14.9 %  Auto Monocyte % : 12.6 %  Auto Eosinophil % : 7.1 %  Auto Basophil % : 0.7 %      03-08    135  |  99  |  111<H>  ----------------------------<  209<H>  5.0   |  26  |  9.80<H>    Ca    8.6      08 Mar 2023 07:33    TPro  5.7<L>  /  Alb  2.7<L>  /  TBili  0.3  /  DBili  x   /  AST  7<L>  /  ALT  <6<L>  /  AlkPhos  91  03-08      CAPILLARY BLOOD GLUCOSE      POCT Blood Glucose.: 109 mg/dL (08 Mar 2023 11:45)  POCT Blood Glucose.: 292 mg/dL (08 Mar 2023 06:03)  POCT Blood Glucose.: 226 mg/dL (07 Mar 2023 21:26)  POCT Blood Glucose.: 194 mg/dL (07 Mar 2023 16:54)      Vital Signs Last 24 Hrs  T(C): 36.4 (08 Mar 2023 10:55), Max: 36.4 (07 Mar 2023 19:33)  T(F): 97.5 (08 Mar 2023 10:55), Max: 97.5 (07 Mar 2023 19:33)  HR: 62 (08 Mar 2023 10:55) (62 - 79)  BP: 132/63 (08 Mar 2023 10:55) (132/63 - 169/72)  BP(mean): --  RR: 18 (08 Mar 2023 10:55) (16 - 18)  SpO2: 99% (08 Mar 2023 10:55) (96% - 99%)    Parameters below as of 08 Mar 2023 10:55  Patient On (Oxygen Delivery Method): room air            PT/INR - ( 08 Mar 2023 07:33 )   PT: 11.6 sec;   INR: 0.99 ratio         PTT - ( 07 Mar 2023 15:00 )  PTT:26.6 sec
This patient has been assessed with a concern for Malnutrition and has been determined to have a diagnosis/diagnoses of Moderate protein-calorie malnutrition and Underweight (BMI < 19).    This patient is being managed with:   Diet NPO after Midnight-     NPO Start Date: 09-Mar-2023   NPO Start Time: 23:59  Except Medications  Entered: Mar  9 2023  7:32AM    Diet Consistent Carbohydrate Renal w/Evening Snack-  Soft and Bite Sized (SOFTBTSZ)  1500mL Fluid Restriction ([id number])  For patients receiving Renal Replacement - No Protein Restr No Conc K No Conc Phos Low Sodium (RENAL)  Supplement Feeding Modality:  Oral  Nepro Cans or Servings Per Day:  1       Frequency:  Two Times a day  Entered: Mar  8 2023 11:34AM    
This patient has been assessed with a concern for Malnutrition and has been determined to have a diagnosis/diagnoses of Moderate protein-calorie malnutrition and Underweight (BMI < 19).    This patient is being managed with:   Diet NPO after Midnight-     NPO Start Date: 09-Mar-2023   NPO Start Time: 23:59  Except Medications  Entered: Mar  9 2023  7:32AM    Diet Consistent Carbohydrate Renal w/Evening Snack-  Soft and Bite Sized (SOFTBTSZ)  1500mL Fluid Restriction ([id number])  For patients receiving Renal Replacement - No Protein Restr No Conc K No Conc Phos Low Sodium (RENAL)  Supplement Feeding Modality:  Oral  Nepro Cans or Servings Per Day:  1       Frequency:  Two Times a day  Entered: Mar  8 2023 11:34AM    
9.6    6.73  )-----------( 159      ( 08 Mar 2023 07:33 )             29.2       CBC Full  -  ( 08 Mar 2023 07:33 )  WBC Count : 6.73 K/uL  RBC Count : 3.11 M/uL  Hemoglobin : 9.6 g/dL  Hematocrit : 29.2 %  Platelet Count - Automated : 159 K/uL  Mean Cell Volume : 93.9 fl  Mean Cell Hemoglobin : 30.9 pg  Mean Cell Hemoglobin Concentration : 32.9 gm/dL  Auto Neutrophil # : 4.26 K/uL  Auto Lymphocyte # : 1.00 K/uL  Auto Monocyte # : 0.85 K/uL  Auto Eosinophil # : 0.48 K/uL  Auto Basophil # : 0.05 K/uL  Auto Neutrophil % : 63.4 %  Auto Lymphocyte % : 14.9 %  Auto Monocyte % : 12.6 %  Auto Eosinophil % : 7.1 %  Auto Basophil % : 0.7 %      03-08    135  |  99  |  111<H>  ----------------------------<  209<H>  5.0   |  26  |  9.80<H>    Ca    8.6      08 Mar 2023 07:33    TPro  5.7<L>  /  Alb  2.7<L>  /  TBili  0.3  /  DBili  x   /  AST  7<L>  /  ALT  <6<L>  /  AlkPhos  91  03-08      CAPILLARY BLOOD GLUCOSE      POCT Blood Glucose.: 109 mg/dL (08 Mar 2023 11:45)  POCT Blood Glucose.: 292 mg/dL (08 Mar 2023 06:03)  POCT Blood Glucose.: 226 mg/dL (07 Mar 2023 21:26)  POCT Blood Glucose.: 194 mg/dL (07 Mar 2023 16:54)      Vital Signs Last 24 Hrs  T(C): 36.4 (08 Mar 2023 10:55), Max: 36.4 (07 Mar 2023 19:33)  T(F): 97.5 (08 Mar 2023 10:55), Max: 97.5 (07 Mar 2023 19:33)  HR: 62 (08 Mar 2023 10:55) (62 - 79)  BP: 132/63 (08 Mar 2023 10:55) (132/63 - 169/72)  BP(mean): --  RR: 18 (08 Mar 2023 10:55) (16 - 18)  SpO2: 99% (08 Mar 2023 10:55) (96% - 99%)    Parameters below as of 08 Mar 2023 10:55  Patient On (Oxygen Delivery Method): room air            PT/INR - ( 08 Mar 2023 07:33 )   PT: 11.6 sec;   INR: 0.99 ratio         PTT - ( 07 Mar 2023 15:00 )  PTT:26.6 sec
This patient has been assessed with a concern for Malnutrition and has been determined to have a diagnosis/diagnoses of Moderate protein-calorie malnutrition and Underweight (BMI < 19).    This patient is being managed with:   Diet Consistent Carbohydrate Renal w/Evening Snack-  Soft and Bite Sized (SOFTBTSZ)  1500mL Fluid Restriction ([id number])  For patients receiving Renal Replacement - No Protein Restr No Conc K No Conc Phos Low Sodium (RENAL)  Supplement Feeding Modality:  Oral  Nepro Cans or Servings Per Day:  1       Frequency:  Two Times a day  Entered: Mar  8 2023 11:34AM    
9.6    6.73  )-----------( 159      ( 08 Mar 2023 07:33 )             29.2       CBC Full  -  ( 08 Mar 2023 07:33 )  WBC Count : 6.73 K/uL  RBC Count : 3.11 M/uL  Hemoglobin : 9.6 g/dL  Hematocrit : 29.2 %  Platelet Count - Automated : 159 K/uL  Mean Cell Volume : 93.9 fl  Mean Cell Hemoglobin : 30.9 pg  Mean Cell Hemoglobin Concentration : 32.9 gm/dL  Auto Neutrophil # : 4.26 K/uL  Auto Lymphocyte # : 1.00 K/uL  Auto Monocyte # : 0.85 K/uL  Auto Eosinophil # : 0.48 K/uL  Auto Basophil # : 0.05 K/uL  Auto Neutrophil % : 63.4 %  Auto Lymphocyte % : 14.9 %  Auto Monocyte % : 12.6 %  Auto Eosinophil % : 7.1 %  Auto Basophil % : 0.7 %      03-08    135  |  99  |  111<H>  ----------------------------<  209<H>  5.0   |  26  |  9.80<H>    Ca    8.6      08 Mar 2023 07:33    TPro  5.7<L>  /  Alb  2.7<L>  /  TBili  0.3  /  DBili  x   /  AST  7<L>  /  ALT  <6<L>  /  AlkPhos  91  03-08      CAPILLARY BLOOD GLUCOSE      POCT Blood Glucose.: 109 mg/dL (08 Mar 2023 11:45)  POCT Blood Glucose.: 292 mg/dL (08 Mar 2023 06:03)  POCT Blood Glucose.: 226 mg/dL (07 Mar 2023 21:26)  POCT Blood Glucose.: 194 mg/dL (07 Mar 2023 16:54)      Vital Signs Last 24 Hrs  T(C): 36.4 (08 Mar 2023 10:55), Max: 36.4 (07 Mar 2023 19:33)  T(F): 97.5 (08 Mar 2023 10:55), Max: 97.5 (07 Mar 2023 19:33)  HR: 62 (08 Mar 2023 10:55) (62 - 79)  BP: 132/63 (08 Mar 2023 10:55) (132/63 - 169/72)  BP(mean): --  RR: 18 (08 Mar 2023 10:55) (16 - 18)  SpO2: 99% (08 Mar 2023 10:55) (96% - 99%)    Parameters below as of 08 Mar 2023 10:55  Patient On (Oxygen Delivery Method): room air            PT/INR - ( 08 Mar 2023 07:33 )   PT: 11.6 sec;   INR: 0.99 ratio         PTT - ( 07 Mar 2023 15:00 )  PTT:26.6 sec
9.6    6.73  )-----------( 159      ( 08 Mar 2023 07:33 )             29.2       CBC Full  -  ( 08 Mar 2023 07:33 )  WBC Count : 6.73 K/uL  RBC Count : 3.11 M/uL  Hemoglobin : 9.6 g/dL  Hematocrit : 29.2 %  Platelet Count - Automated : 159 K/uL  Mean Cell Volume : 93.9 fl  Mean Cell Hemoglobin : 30.9 pg  Mean Cell Hemoglobin Concentration : 32.9 gm/dL  Auto Neutrophil # : 4.26 K/uL  Auto Lymphocyte # : 1.00 K/uL  Auto Monocyte # : 0.85 K/uL  Auto Eosinophil # : 0.48 K/uL  Auto Basophil # : 0.05 K/uL  Auto Neutrophil % : 63.4 %  Auto Lymphocyte % : 14.9 %  Auto Monocyte % : 12.6 %  Auto Eosinophil % : 7.1 %  Auto Basophil % : 0.7 %      03-08    135  |  99  |  111<H>  ----------------------------<  209<H>  5.0   |  26  |  9.80<H>    Ca    8.6      08 Mar 2023 07:33    TPro  5.7<L>  /  Alb  2.7<L>  /  TBili  0.3  /  DBili  x   /  AST  7<L>  /  ALT  <6<L>  /  AlkPhos  91  03-08      CAPILLARY BLOOD GLUCOSE      POCT Blood Glucose.: 109 mg/dL (08 Mar 2023 11:45)  POCT Blood Glucose.: 292 mg/dL (08 Mar 2023 06:03)  POCT Blood Glucose.: 226 mg/dL (07 Mar 2023 21:26)  POCT Blood Glucose.: 194 mg/dL (07 Mar 2023 16:54)      Vital Signs Last 24 Hrs  T(C): 36.4 (08 Mar 2023 10:55), Max: 36.4 (07 Mar 2023 19:33)  T(F): 97.5 (08 Mar 2023 10:55), Max: 97.5 (07 Mar 2023 19:33)  HR: 62 (08 Mar 2023 10:55) (62 - 79)  BP: 132/63 (08 Mar 2023 10:55) (132/63 - 169/72)  BP(mean): --  RR: 18 (08 Mar 2023 10:55) (16 - 18)  SpO2: 99% (08 Mar 2023 10:55) (96% - 99%)    Parameters below as of 08 Mar 2023 10:55  Patient On (Oxygen Delivery Method): room air            PT/INR - ( 08 Mar 2023 07:33 )   PT: 11.6 sec;   INR: 0.99 ratio         PTT - ( 07 Mar 2023 15:00 )  PTT:26.6 sec
This patient has been assessed with a concern for Malnutrition and has been determined to have a diagnosis/diagnoses of Moderate protein-calorie malnutrition and Underweight (BMI < 19).    This patient is being managed with:   Diet DASH/TLC-  Sodium & Cholesterol Restricted  Consistent Carbohydrate {No Snacks}  1000mL Fluid Restriction ([id number])  Entered: Mar 10 2023  6:24PM    
This patient has been assessed with a concern for Malnutrition and has been determined to have a diagnosis/diagnoses of Moderate protein-calorie malnutrition and Underweight (BMI < 19).    This patient is being managed with:   Diet DASH/TLC-  Sodium & Cholesterol Restricted  Consistent Carbohydrate {No Snacks}  1000mL Fluid Restriction ([id number])  Entered: Mar 10 2023  6:24PM    
This patient has been assessed with a concern for Malnutrition and has been determined to have a diagnosis/diagnoses of Moderate protein-calorie malnutrition and Underweight (BMI < 19).    This patient is being managed with:   Diet NPO after Midnight-     NPO Start Date: 09-Mar-2023   NPO Start Time: 23:59  Except Medications  Entered: Mar  9 2023  7:32AM    Diet Consistent Carbohydrate Renal w/Evening Snack-  Soft and Bite Sized (SOFTBTSZ)  1500mL Fluid Restriction ([id number])  For patients receiving Renal Replacement - No Protein Restr No Conc K No Conc Phos Low Sodium (RENAL)  Supplement Feeding Modality:  Oral  Nepro Cans or Servings Per Day:  1       Frequency:  Two Times a day  Entered: Mar  8 2023 11:34AM    
This patient has been assessed with a concern for Malnutrition and has been determined to have a diagnosis/diagnoses of Moderate protein-calorie malnutrition and Underweight (BMI < 19).    This patient is being managed with:   Diet NPO after Midnight-     NPO Start Date: 09-Mar-2023   NPO Start Time: 23:59  Except Medications  Entered: Mar  9 2023  7:32AM    Diet Consistent Carbohydrate Renal w/Evening Snack-  Soft and Bite Sized (SOFTBTSZ)  1500mL Fluid Restriction ([id number])  For patients receiving Renal Replacement - No Protein Restr No Conc K No Conc Phos Low Sodium (RENAL)  Supplement Feeding Modality:  Oral  Nepro Cans or Servings Per Day:  1       Frequency:  Two Times a day  Entered: Mar  8 2023 11:34AM

## 2023-03-11 NOTE — PROGRESS NOTE ADULT - REASON FOR ADMISSION
admitted for RLE angiogram

## 2023-03-11 NOTE — PROGRESS NOTE ADULT - ASSESSMENT
72-year-old female with history of A-fib, diabetes, hypertension, hyperlipidemia, end-stage renal disease hemodialysis, CHF, CAD/CABG, PVD sent in from [facility] for admission for right lower extremity angiogram ,patient is scheduled by vascular team for intervention tomorrow and requires cardiology clearance ,labs ,EKG and chest xray .  Patient otherwise feeling well and has no complaints. Last dialysis - yesterday. Patient was recently hospitaliazed with malfunctioning HD fistula , nephrologist contacted . Seen  by vascular teeam 02/24 during previous admission PAD S/P R-->L bifem-fem BK pop bypass, recent fempop bypass (6/21/2022), and partial ray amputation right great toe (6/22/2022), a/w hyperkalemia (was initially scheduled for a fistulogram with Dr. Webster 2/22).    
72-year-old female with history of A-fib, diabetes, hypertension, hyperlipidemia, end-stage renal disease hemodialysis, CHF, CAD/CABG, PVD sent in from [facility] for admission for right lower extremity angiogram ,patient is scheduled by vascular team for intervention tomorrow and requires cardiology clearance ,labs ,EKG and chest xray .  Patient otherwise feeling well and has no complaints. Last dialysis - yesterday. Patient was recently hospitaliazed with malfunctioning HD fistula , nephrologist contacted . Seen  by vascular teeam 02/24 during previous admission PAD S/P R-->L bifem-fem BK pop bypass, recent fempop bypass (6/21/2022), and partial ray amputation right great toe (6/22/2022), a/w hyperkalemia (was initially scheduled for a fistulogram with Dr. Webster 2/22).    
73 yo female with pmhx of  A-fib, diabetes, hypertension, hyperlipidemia, end-stage renal disease hemodialysis, CHF, CAD/CABG, PVD sent in from [facility] for admission for right lower extremity angiogram.      S/P RLE angiogram with incomplete visualization distal runoff  Pt with R fem-Pop bypass and difficult angiogram  Will obtain RLE arterial duplex for further evaluation  If sig below knee disease may need distal access approach for angiogram  HD today  Cont current medical management
  72-year-old female with history of A-fib, diabetes, hypertension, hyperlipidemia, end-stage renal disease hemodialysis, CHF, CAD/CABG, PVD sent in from [facility] for admission for right lower extremity angiogram ,patient is scheduled by vascular team for intervention tomorrow and requires cardiology clearance ,labs ,EKG and chest xray .  Patient otherwise feeling well and has no complaints. Last dialysis - yesterday. Patient was recently hospitaliazed with malfunctioning HD fistula , nephrologist contacted . (07 Mar 2023 16:13)    esrd on hd , need to optimized  fistula    Excess fluids and waste products will be removed from your blood; your electrolytes will be balanced; your blood pressure will be controlled.    BP monitoring,continue current antihypertensive meds, low salt diet,followup with PMD in 1-2 weeks    Accuchecks monitoring and insulin sliding scale coverage, no concentrated sweets diet, serial labs and f/up with PMD, monitor HB A 1 C every 3-4 mnth    ANEMIA PLAN:  Anemia of chronic disease:    We will continue epo aiming for a HCT of 32-36 %.   We will monitor Iron stores, B12 and RBC folate .        
pt with pvd-scheduled for angiogram 3/10  ashd  s/p cabg  recent coronary  angiogram -patent by- pass graft  chf - compensated  esrd- on hd  dm2  hypertension  dyslipidemia  cardiac status stable low risk for bypass rt leg if needed 3/10  pt tolerated femoral- femoral bypass 3/10
  72-year-old female with history of A-fib, diabetes, hypertension, hyperlipidemia, end-stage renal disease hemodialysis, CHF, CAD/CABG, PVD sent in from [facility] for admission for right lower extremity angiogram ,patient is scheduled by vascular team for intervention tomorrow and requires cardiology clearance ,labs ,EKG and chest xray .  Patient otherwise feeling well and has no complaints. Last dialysis - yesterday. Patient was recently hospitaliazed with malfunctioning HD fistula , nephrologist contacted . (07 Mar 2023 16:13)    esrd on hd , need to optimized  fistula    Excess fluids and waste products will be removed from your blood; your electrolytes will be balanced; your blood pressure will be controlled.    BP monitoring,continue current antihypertensive meds, low salt diet,followup with PMD in 1-2 weeks    Accuchecks monitoring and insulin sliding scale coverage, no concentrated sweets diet, serial labs and f/up with PMD, monitor HB A 1 C every 3-4 mnth    ANEMIA PLAN:  Anemia of chronic disease:    We will continue epo aiming for a HCT of 32-36 %.   We will monitor Iron stores, B12 and RBC folate .        
73 yo female with pmhx of  A-fib, diabetes, hypertension, hyperlipidemia, end-stage renal disease hemodialysis, CHF, CAD/CABG, PVD sent in from [facility] for admission for right lower extremity angiogram. Plan for angio on friday with Vascular team.     Problem/Recommendation - 1:  ·  Problem: PAD (peripheral artery disease).   ·  Recommendation: Plan for angio today 3/10/23  Continue current medical treatment per primary team   Appreciate medical and cardiac clearance    NPO p MN  HD to be done Saturday after angio 3/11/23  
  72-year-old female with history of A-fib, diabetes, hypertension, hyperlipidemia, end-stage renal disease hemodialysis, CHF, CAD/CABG, PVD sent in from [facility] for admission for right lower extremity angiogram ,patient is scheduled by vascular team for intervention tomorrow and requires cardiology clearance ,labs ,EKG and chest xray .  Patient otherwise feeling well and has no complaints. Last dialysis - yesterday. Patient was recently hospitaliazed with malfunctioning HD fistula , nephrologist contacted . (07 Mar 2023 16:13)    esrd on hd , need to optimized  fistula    Excess fluids and waste products will be removed from your blood; your electrolytes will be balanced; your blood pressure will be controlled.    BP monitoring,continue current antihypertensive meds, low salt diet,followup with PMD in 1-2 weeks    Accuchecks monitoring and insulin sliding scale coverage, no concentrated sweets diet, serial labs and f/up with PMD, monitor HB A 1 C every 3-4 mnth    ANEMIA PLAN:  Anemia of chronic disease:    We will continue epo aiming for a HCT of 32-36 %.   We will monitor Iron stores, B12 and RBC folate .        
  72-year-old female with history of A-fib, diabetes, hypertension, hyperlipidemia, end-stage renal disease hemodialysis, CHF, CAD/CABG, PVD sent in from [facility] for admission for right lower extremity angiogram ,patient is scheduled by vascular team for intervention tomorrow and requires cardiology clearance ,labs ,EKG and chest xray .  Patient otherwise feeling well and has no complaints. Last dialysis - yesterday. Patient was recently hospitaliazed with malfunctioning HD fistula , nephrologist contacted . (07 Mar 2023 16:13)    esrd on hd , need to optimized  fistula    Excess fluids and waste products will be removed from your blood; your electrolytes will be balanced; your blood pressure will be controlled.    BP monitoring,continue current antihypertensive meds, low salt diet,followup with PMD in 1-2 weeks    Accuchecks monitoring and insulin sliding scale coverage, no concentrated sweets diet, serial labs and f/up with PMD, monitor HB A 1 C every 3-4 mnth    ANEMIA PLAN:  Anemia of chronic disease:    We will continue epo aiming for a HCT of 32-36 %.   We will monitor Iron stores, B12 and RBC folate .        
  REVIEW OF SYMPTOMS      Able to give ROS  Yes     RELIABLE +/-   CONSTITUTIONAL Weakness Yes  Chills No   ENDOCRINE  No heat or cold intolerance    ALLERGY No hives  Sore throat No stridor  RESP Coughing blood no  Shortness of breath YES   NEURO No Headache  Confusion Pain neck No   CARDIAC No Chest pain No Palpitations   GI  Pain abdomen NO   Vomiting NO     NOTABLE FINDINGS    PHYSICAL EXAM    HEENT Unremarkable  atraumatic   RESP Fair air entry EXP prolonged    Harsh breath sound Resp distres mild   CARDIAC S1 S2 No S3     NO JVD    ABDOMEN SOFT BS PRESENT NOT DISTENDED No hepatosplenomegaly PEDAL EDEMA present No calf tenderness  NO rash       GENERAL DATA .   GOC.   3/7/2023 full code  ALLGY.  latex                      WT.  3/7/2023 50   BMI.     3/7/2023 16             ICU STAY. .. none  COVID. ..      BEST PRACTICE ISSUES.    HOB ELEVATN. Yes  DVT PPLX. ..      ELDER PPLX. .. 3/7/2023 protonix 40      INFN PPLX. ..    SP SW AMINAH. -> 3/9/2023 reg solids thin liq         DIET.  .. 3/7/2023 cons carb renal    IV fl...        ABGS.    VS/ PO/IO/ VENT/ DRIPS.   3/10/2023 afeb 62 130/50   3/10/2023 ra 95%     AGE SEX DOA C/C.  72 f   3/7/2023   Preop angio lower extremity     OUTSIDE PULM MD.   3/7/2023 None   3/7/2023 Pt from [facility]     PMH .  CAD.  .. HISTORY ho cabg  A fib  .. 3/7/2023 Not on ac sec multiple falls  PAD  .. sp R-> L bifem - fem BK pop bypass   .. Fem pop bypass 6/21/2022   .. Partial ray amputation r great toe 6/22/2022   ESRD   .. On HD     HOME MEDS include  .. Clonidine .1 x 2   .. ASA 81   .. atorvastat 40   .. Metoprolol 100   .. protonix   .. Imipramine 50   .     PROBLEM/ASSESSMENT/PLAN.  INFECTION.  .. W 3/7-3/9/2023 w 8.6- 10  .. cxr 3/7/2023 cxr napd   .. No active infection suspecetd   CAD.  .. HISTORY ho cabg  .. EKG 3/7/2023 nsr st t abn consider lateral ischemia qtc  .. 3/7/2023 ASA 81   .. 3/7/2023 atorvastat 40   .. 3/7/2023 Metoprolol 100   .. continue   Hypertension.  .. 3/7/2023 clonidine .1 x 2   .. under control  A fib  .. 3/7/2023 ASA 81   .. 3/7/2023 Metoprolol 100   .. 3/7/2023 Not on ac sec multiple falls  .. rate control  Hemat.  .. Hb 3/7-3/8/2023 Hb 10.9 - 6.7   .. Mcv 3/7/2023 mcv 94   .. Inr 3/7/2023 inr .95  .. monitor   Renal.  .. Na 3/7/2023 Na 132   .. Cr 3/7/2023 Cr 8.5  ESRD.  .. HD done 3/6   R arm av fistula.  .. VA Duplex rue 2/23/2023 Narrowing outflow cephalic vein sec to local thrombus mid forearm and decreased flow volumes   PAD  .. 3/7/2023 For angio  PREOP  .. 3/7/2023 In optimal pulm status for planned angio to evaluate pad  .       OVERALL .  A fib  .. 3/7/2023 Not on ac sec multiple falls  ESRD  .. On HD   PREOP  .. 3/7/2023 In optimal pulm status for planned angio to evaluate pad  .     TIME SPENT   Over 25 minutes aggregate care time spent on encounter; activities included   direct patient care, counseling and/or coordinating care reviewing notes, lab data/ imaging , discussion with multidisciplinary team/ patient  /family and explaining in detail risks, benefits, alternatives  of the recommendations     JOSE F LAO 71 f 3/7/2023 1948 DR HARRY ALBRIGHT 
  REVIEW OF SYMPTOMS      Able to give ROS  Yes     RELIABLE +/-   CONSTITUTIONAL Weakness Yes  Chills No   ENDOCRINE  No heat or cold intolerance    ALLERGY No hives  Sore throat No stridor  RESP Coughing blood no  Shortness of breath YES   NEURO No Headache  Confusion Pain neck No   CARDIAC No Chest pain No Palpitations   GI  Pain abdomen NO   Vomiting NO     NOTABLE FINDINGS    PHYSICAL EXAM    HEENT Unremarkable  atraumatic   RESP Fair air entry EXP prolonged    Harsh breath sound Resp distres mild   CARDIAC S1 S2 No S3     NO JVD    ABDOMEN SOFT BS PRESENT NOT DISTENDED No hepatosplenomegaly PEDAL EDEMA present No calf tenderness  NO rash       GENERAL DATA .   GOC.   3/7/2023 full code  ALLGY.  latex                      WT.  3/7/2023 50   BMI.     3/7/2023 16             ICU STAY. .. none  COVID. ..      BEST PRACTICE ISSUES.    HOB ELEVATN. Yes  DVT PPLX. ..      ELDER PPLX. .. 3/7/2023 protonix 40      INFN PPLX. ..    SP SW AMINAH. -> 3/9/2023 reg solids thin liq         DIET.  .. 3/7/2023 cons carb renal    IV fl...        ABGS.    VS/ PO/IO/ VENT/ DRIPS.   3/11/2023 afeb 76 140/60   3/11/2023 ra 100%     AGE SEX DOA C/C.  72 f   3/7/2023   Preop angio lower extremity     OUTSIDE PULM MD.   3/7/2023 None   3/7/2023 Pt from [facility]     PMH .  CAD.  .. HISTORY ho cabg  A fib  .. 3/7/2023 Not on ac sec multiple falls  PAD  .. sp R-> L bifem - fem BK pop bypass   .. Fem pop bypass 6/21/2022   .. Partial ray amputation r great toe 6/22/2022   ESRD   .. On HD     HOME MEDS include  .. Clonidine .1 x 2   .. ASA 81   .. atorvastat 40   .. Metoprolol 100   .. protonix   .. Imipramine 50   .     PROBLEM/ASSESSMENT/PLAN.  INFECTION.  .. W 3/7-3/9/2023 w 8.6- 10  .. cxr 3/7/2023 cxr napd   .. No active infection suspecetd   CAD.  .. HISTORY ho cabg  .. EKG 3/7/2023 nsr st t abn consider lateral ischemia qtc  .. 3/7/2023 ASA 81   .. 3/7/2023 atorvastat 40   .. 3/7/2023 Metoprolol 100   .. continue   Hypertension.  .. 3/7/2023 clonidine .1 x 2   .. under control  A fib  .. 3/7/2023 ASA 81   .. 3/7/2023 Metoprolol 100   .. 3/7/2023 Not on ac sec multiple falls  .. rate control  Hemat.  .. Hb 3/7-3/8/2023 Hb 10.9 - 6.7   .. Mcv 3/7/2023 mcv 94   .. Inr 3/7/2023 inr .95  .. monitor   Renal.  .. Na 3/7/2023 Na 132   .. Cr 3/7/2023 Cr 8.5  ESRD.  .. HD done 3/6   R arm av fistula.  .. VA Duplex rue 2/23/2023 Narrowing outflow cephalic vein sec to local thrombus mid forearm and decreased flow volumes   PAD  .. 3/7/2023 For angio    OVERALL .  A fib  .. 3/7/2023 Not on ac sec multiple falls  ESRD  .. On HD   VASC  .. 3/7/2023 In optimal pulm status for planned angio to evaluate pad  .. 3/10 angio was suboptimal Further saenz to be planned electively   .     TIME SPENT   Over 25 minutes aggregate care time spent on encounter; activities included   direct patient care, counseling and/or coordinating care reviewing notes, lab data/ imaging , discussion with multidisciplinary team/ patient  /family and explaining in detail risks, benefits, alternatives  of the recommendations     JOSE F LAO 71 f 3/7/2023 1948 DR HARRY ALBRIGHT 
  REVIEW OF SYMPTOMS      Able to give ROS  Yes     RELIABLE +/-   CONSTITUTIONAL Weakness Yes  Chills No   ENDOCRINE  No heat or cold intolerance    ALLERGY No hives  Sore throat No stridor  RESP Coughing blood no  Shortness of breath YES   NEURO No Headache  Confusion Pain neck No   CARDIAC No Chest pain No Palpitations   GI  Pain abdomen NO   Vomiting NO     NOTABLE FINDINGS    PHYSICAL EXAM    HEENT Unremarkable  atraumatic   RESP Fair air entry EXP prolonged    Harsh breath sound Resp distres mild   CARDIAC S1 S2 No S3     NO JVD    ABDOMEN SOFT BS PRESENT NOT DISTENDED No hepatosplenomegaly PEDAL EDEMA present No calf tenderness  NO rash       GENERAL DATA .   GOC.   3/7/2023 full code  ALLGY.  latex                      WT.  3/7/2023 50   BMI.     3/7/2023 16             ICU STAY. .. none  COVID. ..      BEST PRACTICE ISSUES.    HOB ELEVATN. Yes  DVT PPLX. ..      ELDER PPLX. .. 3/7/2023 protonix 40      INFN PPLX. ..    SP SW AMINAH. -> 3/9/2023 reg solids thin liq         DIET.  .. 3/7/2023 cons carb renal    IV fl...        AGE SEX DOA C/C.  72 f   3/7/2023   Preop angio lower extremity     OUTSIDE PULM MD.   3/7/2023 None   3/7/2023 Pt from [facility]     PMH .  CAD.  .. HISTORY ho cabg  A fib  .. 3/7/2023 Not on ac sec multiple falls  PAD  .. sp R-> L bifem - fem BK pop bypass   .. Fem pop bypass 6/21/2022   .. Partial ray amputation r great toe 6/22/2022   ESRD   .. On HD     HOME MEDS include  .. Clonidine .1 x 2   .. ASA 81   .. atorvastat 40   .. Metoprolol 100   .. protonix   .. Imipramine 50   .     PROBLEM/ASSESSMENT/PLAN.  INFECTION.  .. W 3/7-3/9/2023 w 8.6- 10  .. cxr 3/7/2023 cxr napd   .. No active infection suspecetd   CAD.  .. HISTORY ho cabg  .. EKG 3/7/2023 nsr st t abn consider lateral ischemia qtc  .. 3/7/2023 ASA 81   .. 3/7/2023 atorvastat 40   .. 3/7/2023 Metoprolol 100   .. continue   Hypertension.  .. 3/7/2023 clonidine .1 x 2   .. under control  A fib  .. 3/7/2023 ASA 81   .. 3/7/2023 Metoprolol 100   .. 3/7/2023 Not on ac sec multiple falls  .. rate control  Hemat.  .. Hb 3/7-3/8/2023 Hb 10.9 - 6.7   .. Mcv 3/7/2023 mcv 94   .. Inr 3/7/2023 inr .95  .. monitor   Renal.  .. Na 3/7/2023 Na 132   .. Cr 3/7/2023 Cr 8.5  ESRD.  .. HD done 3/6   R arm av fistula.  .. VA Duplex rue 2/23/2023 Narrowing outflow cephalic vein sec to local thrombus mid forearm and decreased flow volumes   PAD  .. 3/7/2023 For angio  PREOP  .. 3/7/2023 In optimal pulm status for planned angio to evaluate pad  .       OVERALL .  A fib  .. 3/7/2023 Not on ac sec multiple falls  ESRD  .. On HD   PREOP  .. 3/7/2023 In optimal pulm status for planned angio to evaluate pad  .     TIME SPENT   Over 25 minutes aggregate care time spent on encounter; activities included   direct patient care, counseling and/or coordinating care reviewing notes, lab data/ imaging , discussion with multidisciplinary team/ patient  /family and explaining in detail risks, benefits, alternatives  of the recommendations     JOSE F SHILO 71 f 3/7/2023 1948 DR HARRY ALBRIGHT 
  REVIEW OF SYMPTOMS      Able to give ROS  Yes     RELIABLE +/-   CONSTITUTIONAL Weakness Yes  Chills No   ENDOCRINE  No heat or cold intolerance    ALLERGY No hives  Sore throat No stridor  RESP Coughing blood no  Shortness of breath YES   NEURO No Headache  Confusion Pain neck No   CARDIAC No Chest pain No Palpitations   GI  Pain abdomen NO   Vomiting NO     NOTABLE FINDINGS    PHYSICAL EXAM    HEENT Unremarkable  atraumatic   RESP Fair air entry EXP prolonged    Harsh breath sound Resp distres mild   CARDIAC S1 S2 No S3     NO JVD    ABDOMEN SOFT BS PRESENT NOT DISTENDED No hepatosplenomegaly PEDAL EDEMA present No calf tenderness  NO rash       GENERAL DATA .   GOC.   3/7/2023 full code  ALLGY.  latex                      WT.  3/7/2023 50   BMI.     3/7/2023 16             ICU STAY. .. none  COVID. ..      BEST PRACTICE ISSUES.    HOB ELEVATN. Yes  DVT PPLX. ..      ELEDR PPLX. .. 3/7/2023 protonix 40      INFN PPLX. ..    SP SW AMINAH.         DIET.  .. 3/7/2023 cons carb renal    IV fl...     ABGS.    VS/ PO/IO/ VENT/ DRIPS.   3/8/2023 99f 76 120/50   3/8/2023 ra 99%     AGE SEX DOA C/C.  72 f   3/7/2023   Preop angio lower extremity     OUTSIDE PULKOJO HOUGH.   3/7/2023 None   3/7/2023 Pt from [facility]     PMH .  CAD.  .. HISTORY ho cabg  A fib  .. 3/7/2023 Not on ac sec multiple falls  PAD  .. sp R-> L bifem - fem BK pop bypass   .. Fem pop bypass 6/21/2022   .. Partial ray amputation r great toe 6/22/2022   ESRD   .. On HD     HOME MEDS include  .. Clonidine .1 x 2   .. ASA 81   .. atorvastat 40   .. Metoprolol 100   .. protonix   .. Imipramine 50   .     PROBLEM/ASSESSMENT/PLAN.  INFECTION.  .. W 3/7/2023 w 8.6  .. cxr 3/7/2023 cxr napd   .. No active infection suspecetd   CAD.  .. HISTORY ho cabg  .. EKG 3/7/2023 nsr st t abn consider lateral ischemia qtc  .. 3/7/2023 ASA 81   .. 3/7/2023 atorvastat 40   .. 3/7/2023 Metoprolol 100   .. continue   Hypertension.  .. 3/7/2023 clonidine .1 x 2   .. under control  A fib  .. 3/7/2023 ASA 81   .. 3/7/2023 Metoprolol 100   .. 3/7/2023 Not on ac sec multiple falls  .. rate control  Hemat.  .. Hb 3/7-3/8/2023 Hb 10.9 - 6.7   .. Mcv 3/7/2023 mcv 94   .. Inr 3/7/2023 inr .95  .. monitor   Renal.  .. Na 3/7/2023 Na 132   .. Cr 3/7/2023 Cr 8.5  ESRD.  .. HD done 3/6   R arm av fistula.  .. VA Duplex rue 2/23/2023 Narrowing outflow cephalic vein sec to local thrombus mid forearm and decreased flow volumes   PAD  .. 3/7/2023 For angio  PREOP  .. 3/7/2023 In optimal pulm status for planned angio to evaluate pad  .     OVERALL .  A fib  .. 3/7/2023 Not on ac sec multiple falls  ESRD  .. On HD   PREOP  .. 3/7/2023 In optimal pulm status for planned angio to evaluate pad  .     TIME SPENT   Over 25 minutes aggregate care time spent on encounter; activities included   direct patient care, counseling and/or coordinating care reviewing notes, lab data/ imaging , discussion with multidisciplinary team/ patient  /family and explaining in detail risks, benefits, alternatives  of the recommendations     JOSE F LAO 71 f 3/7/2023 1948 DR HARRY ALBRIGHT 
pt with pvd-scheduled for angiogram 3/10  ashd  s/p cabg  recent coronary  angiogram -patent by- pass graft  chf - compensated  esrd- on hd  dm2  hypertension  dyslipidemia  cardiac status stable low risk for bypass rt leg if needed 3/9
73 yo female with pmhx of  A-fib, diabetes, hypertension, hyperlipidemia, end-stage renal disease hemodialysis, CHF, CAD/CABG, PVD sent in from [facility] for admission for right lower extremity angiogram. Plan for angio on friday with Vascular team.     Problem/Recommendation - 1:  ·  Problem: PAD (peripheral artery disease).   ·  Recommendation: Plan for angio on friday 3/10/23  Continue current medical treatment per primary team   Appreciate medical and cardiac clearance    NPO p MN  HD to be done Saturday after angio 3/11/23      
pt with pvd-scheduled for angiogram 3/10  ashd  s/p cabg  recent coronary  angiogram -patent by- pass graft  chf - compensated  esrd- on hd  dm2  hypertension  dyslipidemia  cardiac status stable low risk for bypass rt leg if needed 3/10
72-year-old female with history of A-fib, diabetes, hypertension, hyperlipidemia, end-stage renal disease hemodialysis, CHF, CAD/CABG, PVD sent in from [facility] for admission for right lower extremity angiogram ,patient is scheduled by vascular team for intervention tomorrow and requires cardiology clearance ,labs ,EKG and chest xray .  Patient otherwise feeling well and has no complaints. Last dialysis - yesterday. Patient was recently hospitaliazed with malfunctioning HD fistula , nephrologist contacted . Seen  by vascular teeam 02/24 during previous admission PAD S/P R-->L bifem-fem BK pop bypass, recent fempop bypass (6/21/2022), and partial ray amputation right great toe (6/22/2022), a/w hyperkalemia (was initially scheduled for a fistulogram with Dr. Webster 2/22).    
72-year-old female with history of A-fib, diabetes, hypertension, hyperlipidemia, end-stage renal disease hemodialysis, CHF, CAD/CABG, PVD sent in from [facility] for admission for right lower extremity angiogram ,patient is scheduled by vascular team for intervention tomorrow and requires cardiology clearance ,labs ,EKG and chest xray .  Patient otherwise feeling well and has no complaints. Last dialysis - yesterday. Patient was recently hospitaliazed with malfunctioning HD fistula , nephrologist contacted . Seen  by vascular teeam 02/24 during previous admission PAD S/P R-->L bifem-fem BK pop bypass, recent fempop bypass (6/21/2022), and partial ray amputation right great toe (6/22/2022), a/w hyperkalemia (was initially scheduled for a fistulogram with Dr. Webster 2/22).

## 2023-03-11 NOTE — CHART NOTE - NSCHARTNOTEFT_GEN_A_CORE
Nutrition follow up ( chart reviewed, events noted) Brief hx:      Factors impacting intake: [ ] none [ ] nausea  [ ] vomiting [ ] diarrhea [ ] constipation  [ ]chewing problems [ ] swallowing issues  [ ] other:     Diet Presciption: Diet, DASH/TLC:   Sodium & Cholesterol Restricted  Consistent Carbohydrate {No Snacks}  1000mL Fluid Restriction ([id number]) (03-10-23 @ 18:24)    Intake:     Current Weight: Weight (kg): 50.3 (03-07 @ 12:53)  % Weight Change    Pertinent Medications: MEDICATIONS  (STANDING):  aspirin enteric coated 81 milliGRAM(s) Oral daily  atorvastatin 40 milliGRAM(s) Oral at bedtime  cloNIDine 0.1 milliGRAM(s) Oral two times a day  dextrose 5%. 1000 milliLiter(s) (50 mL/Hr) IV Continuous <Continuous>  dextrose 5%. 1000 milliLiter(s) (100 mL/Hr) IV Continuous <Continuous>  dextrose 50% Injectable 25 Gram(s) IV Push once  dextrose 50% Injectable 12.5 Gram(s) IV Push once  dextrose 50% Injectable 25 Gram(s) IV Push once  epoetin fawad-epbx (RETACRIT) Injectable 46124 Unit(s) IV Push <User Schedule>  glucagon  Injectable 1 milliGRAM(s) IntraMuscular once  heparin   Injectable 5000 Unit(s) SubCutaneous every 12 hours  imipramine 50 milliGRAM(s) Oral daily  insulin glargine Injectable (LANTUS) 5 Unit(s) SubCutaneous at bedtime  insulin lispro (ADMELOG) corrective regimen sliding scale   SubCutaneous every 6 hours  metoprolol tartrate 100 milliGRAM(s) Oral at bedtime  multivitamin 1 Tablet(s) Oral daily  pantoprazole    Tablet 40 milliGRAM(s) Oral before breakfast  risperiDONE   Tablet 0.5 milliGRAM(s) Oral at bedtime  senna 2 Tablet(s) Oral at bedtime  sodium zirconium cyclosilicate 10 Gram(s) Oral <User Schedule>    MEDICATIONS  (PRN):  acetaminophen     Tablet .. 650 milliGRAM(s) Oral every 6 hours PRN Temp greater or equal to 38C (100.4F), Mild Pain (1 - 3)  aluminum hydroxide/magnesium hydroxide/simethicone Suspension 30 milliLiter(s) Oral every 4 hours PRN Dyspepsia  dextrose Oral Gel 15 Gram(s) Oral once PRN Blood Glucose LESS THAN 70 milliGRAM(s)/deciliter  melatonin 3 milliGRAM(s) Oral at bedtime PRN Insomnia  ondansetron Injectable 4 milliGRAM(s) IV Push every 8 hours PRN Nausea and/or Vomiting  traMADol 50 milliGRAM(s) Oral four times a day PRN Moderate Pain (4 - 6)    Pertinent Labs: 03-11 Na136 mmol/L Glu 174 mg/dL<H> K+ 4.8 mmol/L Cr  8.70 mg/dL<H> BUN 86 mg/dL<H> 03-08 Alb 2.7 g/dL<L>     CAPILLARY BLOOD GLUCOSE      POCT Blood Glucose.: 164 mg/dL (11 Mar 2023 06:14)  POCT Blood Glucose.: 200 mg/dL (10 Mar 2023 23:53)  POCT Blood Glucose.: 129 mg/dL (10 Mar 2023 21:56)  POCT Blood Glucose.: 150 mg/dL (10 Mar 2023 17:55)  POCT Blood Glucose.: 164 mg/dL (10 Mar 2023 15:54)  POCT Blood Glucose.: 163 mg/dL (10 Mar 2023 11:48)    Skin:     Estimated Needs:   [ ] no change since previous assessment  [ ] recalculated:     Previous Nutrition Diagnosis:   [ ] Inadequate Energy Intake [ ]Inadequate Oral Intake [ ] Excessive Energy Intake   [ ] Underweight [ ] Increased Nutrient Needs [ ] Overweight/Obesity   [ ] Altered GI Function [ ] Unintended Weight Loss [ ] Food & Nutrition Related Knowledge Deficit [ ] Malnutrition     Nutrition Diagnosis is [ ] ongoing  [ ] resolved [ ] not applicable     New Nutrition Diagnosis: [ ] not applicable       Interventions:   Recommend  [ ] Change Diet To:  [ ] Nutrition Supplement  [ ] Nutrition Support  [ ] Other:     Monitoring and Evaluation:   [ ] PO intake [ x ] Tolerance to diet prescription [ x ] weights [ x ] labs[ x ] follow up per protocol  [ ] other: Nutrition follow up ( chart reviewed, events noted) Brief hx:  Pt admitted from [facility]. Reviewed transfer documents, pt was on regular consistency renal, NCS diet. Nepro supplement 4x/day. LPS 30ml TID. Pt reports eating fairly well PTA. Reports overall decrease in po intake PTA 2/2 dislike of food at facility. Typically consumes 3 small portion meals/day.  difficulty chewing  Pt is a "72-year-old female with history of A-fib, diabetes, hypertension, hyperlipidemia, end-stage renal disease hemodialysis, CHF, CAD/CABG, PVD sent in from [facility] for admission for right lower extremity angiogram."    Visited pt at bedside this am. Pt resting during visit. Pt NPO this am for procedure/test. Diet order now active for soft/bite size, con cho, renal diet with 1500ml FR. Receiving Nepro BID. Pt denies chewing/swallowing difficulties, although reports missing lower dentures. No food allergies noted. Denies N/V/D/C. No BMs thus far. CBW on admission 110#. Pt reports weight loss over the past several months, previous wt of 135#. No edema noted. Skin ecchymotic. Pt with hx of DM, A1c of 6.8% obtained. Diet education not appropriate at this time. Recommend assistance/encouragement at meal times as needed.      Factors impacting intake: [ ] none [ ] nausea  [ ] vomiting [ ] diarrhea [ ] constipation  [ ]chewing problems [ ] swallowing issues  [ ] other:     Diet Presciption: Diet, DASH/TLC:   Sodium & Cholesterol Restricted  Consistent Carbohydrate {No Snacks}  1000mL Fluid Restriction ([id number]) (03-10-23 @ 18:24)    Intake:     Current Weight: Weight (kg): 50.3 (03-07 @ 12:53)  % Weight Change    Pertinent Medications: MEDICATIONS  (STANDING):  aspirin enteric coated 81 milliGRAM(s) Oral daily  atorvastatin 40 milliGRAM(s) Oral at bedtime  cloNIDine 0.1 milliGRAM(s) Oral two times a day  dextrose 5%. 1000 milliLiter(s) (50 mL/Hr) IV Continuous <Continuous>  dextrose 5%. 1000 milliLiter(s) (100 mL/Hr) IV Continuous <Continuous>  dextrose 50% Injectable 25 Gram(s) IV Push once  dextrose 50% Injectable 12.5 Gram(s) IV Push once  dextrose 50% Injectable 25 Gram(s) IV Push once  epoetin fawad-epbx (RETACRIT) Injectable 29493 Unit(s) IV Push <User Schedule>  glucagon  Injectable 1 milliGRAM(s) IntraMuscular once  heparin   Injectable 5000 Unit(s) SubCutaneous every 12 hours  imipramine 50 milliGRAM(s) Oral daily  insulin glargine Injectable (LANTUS) 5 Unit(s) SubCutaneous at bedtime  insulin lispro (ADMELOG) corrective regimen sliding scale   SubCutaneous every 6 hours  metoprolol tartrate 100 milliGRAM(s) Oral at bedtime  multivitamin 1 Tablet(s) Oral daily  pantoprazole    Tablet 40 milliGRAM(s) Oral before breakfast  risperiDONE   Tablet 0.5 milliGRAM(s) Oral at bedtime  senna 2 Tablet(s) Oral at bedtime  sodium zirconium cyclosilicate 10 Gram(s) Oral <User Schedule>    MEDICATIONS  (PRN):  acetaminophen     Tablet .. 650 milliGRAM(s) Oral every 6 hours PRN Temp greater or equal to 38C (100.4F), Mild Pain (1 - 3)  aluminum hydroxide/magnesium hydroxide/simethicone Suspension 30 milliLiter(s) Oral every 4 hours PRN Dyspepsia  dextrose Oral Gel 15 Gram(s) Oral once PRN Blood Glucose LESS THAN 70 milliGRAM(s)/deciliter  melatonin 3 milliGRAM(s) Oral at bedtime PRN Insomnia  ondansetron Injectable 4 milliGRAM(s) IV Push every 8 hours PRN Nausea and/or Vomiting  traMADol 50 milliGRAM(s) Oral four times a day PRN Moderate Pain (4 - 6)    Pertinent Labs: 03-11 Na136 mmol/L Glu 174 mg/dL<H> K+ 4.8 mmol/L Cr  8.70 mg/dL<H> BUN 86 mg/dL<H> 03-08 Alb 2.7 g/dL<L>     CAPILLARY BLOOD GLUCOSE      POCT Blood Glucose.: 164 mg/dL (11 Mar 2023 06:14)  POCT Blood Glucose.: 200 mg/dL (10 Mar 2023 23:53)  POCT Blood Glucose.: 129 mg/dL (10 Mar 2023 21:56)  POCT Blood Glucose.: 150 mg/dL (10 Mar 2023 17:55)  POCT Blood Glucose.: 164 mg/dL (10 Mar 2023 15:54)  POCT Blood Glucose.: 163 mg/dL (10 Mar 2023 11:48)    Skin:     Estimated Needs:   [ ] no change since previous assessment  [ ] recalculated:     Previous Nutrition Diagnosis:   [ ] Inadequate Energy Intake [ ]Inadequate Oral Intake [ ] Excessive Energy Intake   [ ] Underweight [ ] Increased Nutrient Needs [ ] Overweight/Obesity   [ ] Altered GI Function [ ] Unintended Weight Loss [ ] Food & Nutrition Related Knowledge Deficit [ ] Malnutrition     Nutrition Diagnosis is [ ] ongoing  [ ] resolved [ ] not applicable     New Nutrition Diagnosis: [ ] not applicable       Interventions:   Recommend  [ ] Change Diet To:  [ ] Nutrition Supplement  [ ] Nutrition Support  [ ] Other:     Monitoring and Evaluation:   [ ] PO intake [ x ] Tolerance to diet prescription [ x ] weights [ x ] labs[ x ] follow up per protocol  [ ] other: Nutrition follow up ( chart reviewed, events noted) Brief hx: ( vascular surgery NP today 3/11) 71 yo female with pmhx of  A-fib, diabetes, hypertension, hyperlipidemia, end-stage renal disease hemodialysis, CHF, CAD/CABG, PVD sent in from [facility] for admission for right lower extremity angiogram.      S/P RLE angiogram with incomplete visualization distal runoff  Pt with R fem-Pop bypass and difficult angiogram  Will obtain RLE arterial duplex for further evaluation  If sig below knee disease may need distal access approach for angiogram  HD today         Pt admitted from [facility]. Reviewed transfer documents, pt was on regular consistency renal, NCS diet. Nepro supplement 4x/day. LPS 30ml TID. Pt reports eating fairly well PTA. Reports overall decrease in po intake PTA 2/2 dislike of food at facility. Typically consumes 3 small portion meals/day.  difficulty chewing  Pt is a "72-year-old female with history of A-fib, diabetes, hypertension, hyperlipidemia, end-stage renal disease hemodialysis, CHF, CAD/CABG, PVD sent in from [facility] for admission for right lower extremity angiogram."    Visited pt at bedside this am. Pt resting during visit. Pt NPO this am for procedure/test. Diet order now active for soft/bite size, con cho, renal diet with 1500ml FR. Receiving Nepro BID. Pt denies chewing/swallowing difficulties, although reports missing lower dentures. No food allergies noted. Denies N/V/D/C. No BMs thus far. CBW on admission 110#. Pt reports weight loss over the past several months, previous wt of 135#. No edema noted. Skin ecchymotic. Pt with hx of DM, A1c of 6.8% obtained. Diet education not appropriate at this time. Recommend assistance/encouragement at meal times as needed.      Factors impacting intake: [ ] none [ ] nausea  [ ] vomiting [ ] diarrhea [ ] constipation  [ ]chewing problems [ ] swallowing issues  [ ] other:     Diet Presciption: Diet, DASH/TLC:   Sodium & Cholesterol Restricted  Consistent Carbohydrate {No Snacks}  1000mL Fluid Restriction ([id number]) (03-10-23 @ 18:24)    Intake:     Current Weight: Weight (kg): 50.3 (03-07 @ 12:53)  % Weight Change    Pertinent Medications: MEDICATIONS  (STANDING):  aspirin enteric coated 81 milliGRAM(s) Oral daily  atorvastatin 40 milliGRAM(s) Oral at bedtime  cloNIDine 0.1 milliGRAM(s) Oral two times a day  dextrose 5%. 1000 milliLiter(s) (50 mL/Hr) IV Continuous <Continuous>  dextrose 5%. 1000 milliLiter(s) (100 mL/Hr) IV Continuous <Continuous>  dextrose 50% Injectable 25 Gram(s) IV Push once  dextrose 50% Injectable 12.5 Gram(s) IV Push once  dextrose 50% Injectable 25 Gram(s) IV Push once  epoetin fawad-epbx (RETACRIT) Injectable 20782 Unit(s) IV Push <User Schedule>  glucagon  Injectable 1 milliGRAM(s) IntraMuscular once  heparin   Injectable 5000 Unit(s) SubCutaneous every 12 hours  imipramine 50 milliGRAM(s) Oral daily  insulin glargine Injectable (LANTUS) 5 Unit(s) SubCutaneous at bedtime  insulin lispro (ADMELOG) corrective regimen sliding scale   SubCutaneous every 6 hours  metoprolol tartrate 100 milliGRAM(s) Oral at bedtime  multivitamin 1 Tablet(s) Oral daily  pantoprazole    Tablet 40 milliGRAM(s) Oral before breakfast  risperiDONE   Tablet 0.5 milliGRAM(s) Oral at bedtime  senna 2 Tablet(s) Oral at bedtime  sodium zirconium cyclosilicate 10 Gram(s) Oral <User Schedule>    MEDICATIONS  (PRN):  acetaminophen     Tablet .. 650 milliGRAM(s) Oral every 6 hours PRN Temp greater or equal to 38C (100.4F), Mild Pain (1 - 3)  aluminum hydroxide/magnesium hydroxide/simethicone Suspension 30 milliLiter(s) Oral every 4 hours PRN Dyspepsia  dextrose Oral Gel 15 Gram(s) Oral once PRN Blood Glucose LESS THAN 70 milliGRAM(s)/deciliter  melatonin 3 milliGRAM(s) Oral at bedtime PRN Insomnia  ondansetron Injectable 4 milliGRAM(s) IV Push every 8 hours PRN Nausea and/or Vomiting  traMADol 50 milliGRAM(s) Oral four times a day PRN Moderate Pain (4 - 6)    Pertinent Labs: 03-11 Na136 mmol/L Glu 174 mg/dL<H> K+ 4.8 mmol/L Cr  8.70 mg/dL<H> BUN 86 mg/dL<H> 03-08 Alb 2.7 g/dL<L>     CAPILLARY BLOOD GLUCOSE      POCT Blood Glucose.: 164 mg/dL (11 Mar 2023 06:14)  POCT Blood Glucose.: 200 mg/dL (10 Mar 2023 23:53)  POCT Blood Glucose.: 129 mg/dL (10 Mar 2023 21:56)  POCT Blood Glucose.: 150 mg/dL (10 Mar 2023 17:55)  POCT Blood Glucose.: 164 mg/dL (10 Mar 2023 15:54)  POCT Blood Glucose.: 163 mg/dL (10 Mar 2023 11:48)    Skin:     Estimated Needs:   [ ] no change since previous assessment  [ ] recalculated:     Previous Nutrition Diagnosis:   [ ] Inadequate Energy Intake [ ]Inadequate Oral Intake [ ] Excessive Energy Intake   [ ] Underweight [ ] Increased Nutrient Needs [ ] Overweight/Obesity   [ ] Altered GI Function [ ] Unintended Weight Loss [ ] Food & Nutrition Related Knowledge Deficit [ ] Malnutrition     Nutrition Diagnosis is [ ] ongoing  [ ] resolved [ ] not applicable     New Nutrition Diagnosis: [ ] not applicable       Interventions:   Recommend  [ ] Change Diet To:  [ ] Nutrition Supplement  [ ] Nutrition Support  [ ] Other:     Monitoring and Evaluation:   [ ] PO intake [ x ] Tolerance to diet prescription [ x ] weights [ x ] labs[ x ] follow up per protocol  [ ] other: Nutrition follow up ( chart reviewed, events noted) Brief hx: ( vascular surgery NP today 3/11) 73 yo female with pmhx of  A-fib, diabetes, hypertension, hyperlipidemia, end-stage renal disease hemodialysis, CHF, CAD/CABG, PVD sent in from [facility] for admission for right lower extremity angiogram . Now POD#1 femoral-femoral bypass    Pt not in room at time of RD visit. Pts bkfst tray still in room- plate waste study reveals pt at all of pancakes. per nsg flowsheet, consistently eats % of meals served      Factors impacting intake: [X ] none [ ] nausea  [ ] vomiting [ ] diarrhea [ ] constipation  [ ]chewing problems [ ] swallowing issues  [ ] other:     Diet Presciption: Diet, DASH/TLC:   Sodium & Cholesterol Restricted  Consistent Carbohydrate {No Snacks}  1000mL Fluid Restriction ([id number]) (03-10-23 @ 18:24)    Intake: %    Current Weight: Weight (kg): 50.3 (03-07 @ 12:53)    Pertinent Medications: MEDICATIONS  (STANDING):  aspirin enteric coated 81 milliGRAM(s) Oral daily  atorvastatin 40 milliGRAM(s) Oral at bedtime  cloNIDine 0.1 milliGRAM(s) Oral two times a day  dextrose 5%. 1000 milliLiter(s) (50 mL/Hr) IV Continuous <Continuous>  dextrose 5%. 1000 milliLiter(s) (100 mL/Hr) IV Continuous <Continuous>  dextrose 50% Injectable 25 Gram(s) IV Push once  dextrose 50% Injectable 12.5 Gram(s) IV Push once  dextrose 50% Injectable 25 Gram(s) IV Push once  epoetin fawad-epbx (RETACRIT) Injectable 98238 Unit(s) IV Push <User Schedule>  glucagon  Injectable 1 milliGRAM(s) IntraMuscular once  heparin   Injectable 5000 Unit(s) SubCutaneous every 12 hours  imipramine 50 milliGRAM(s) Oral daily  insulin glargine Injectable (LANTUS) 5 Unit(s) SubCutaneous at bedtime  insulin lispro (ADMELOG) corrective regimen sliding scale   SubCutaneous every 6 hours  metoprolol tartrate 100 milliGRAM(s) Oral at bedtime  multivitamin 1 Tablet(s) Oral daily  pantoprazole    Tablet 40 milliGRAM(s) Oral before breakfast  risperiDONE   Tablet 0.5 milliGRAM(s) Oral at bedtime  senna 2 Tablet(s) Oral at bedtime  sodium zirconium cyclosilicate 10 Gram(s) Oral <User Schedule>    MEDICATIONS  (PRN):  acetaminophen     Tablet .. 650 milliGRAM(s) Oral every 6 hours PRN Temp greater or equal to 38C (100.4F), Mild Pain (1 - 3)  aluminum hydroxide/magnesium hydroxide/simethicone Suspension 30 milliLiter(s) Oral every 4 hours PRN Dyspepsia  dextrose Oral Gel 15 Gram(s) Oral once PRN Blood Glucose LESS THAN 70 milliGRAM(s)/deciliter  melatonin 3 milliGRAM(s) Oral at bedtime PRN Insomnia  ondansetron Injectable 4 milliGRAM(s) IV Push every 8 hours PRN Nausea and/or Vomiting  traMADol 50 milliGRAM(s) Oral four times a day PRN Moderate Pain (4 - 6)    Pertinent Labs: 03-11 Na136 mmol/L Glu 174 mg/dL<H> K+ 4.8 mmol/L Cr  8.70 mg/dL<H> BUN 86 mg/dL<H> 03-08 Alb 2.7 g/dL<L>     CAPILLARY BLOOD GLUCOSE      POCT Blood Glucose.: 164 mg/dL (11 Mar 2023 06:14)  POCT Blood Glucose.: 200 mg/dL (10 Mar 2023 23:53)  POCT Blood Glucose.: 129 mg/dL (10 Mar 2023 21:56)  POCT Blood Glucose.: 150 mg/dL (10 Mar 2023 17:55)  POCT Blood Glucose.: 164 mg/dL (10 Mar 2023 15:54)  POCT Blood Glucose.: 163 mg/dL (10 Mar 2023 11:48)    Skin: R 2nd toe diabetic ulcer  BM: 3/9  edema: no    Estimated Needs:   [X ] no change since previous assessment, based on IBW of 65.7 kg ( 30-35 kcals/kg) 0885-5613 kcals and ( 1.2-1.4 gm pro/kg)        Previous Nutrition Diagnosis:   [ ] Inadequate Energy Intake [ ]Inadequate Oral Intake [ ] Excessive Energy Intake   [ ] Underweight [ ] Increased Nutrient Needs [ ] Overweight/Obesity   [ ] Altered GI Function [ ] Unintended Weight Loss [ ] Food & Nutrition Related Knowledge Deficit [X ] Malnutrition , moderate in context of chronic illness    Nutrition Diagnosis is [ X] ongoing  [ ] resolved [ ] not applicable     New Nutrition Diagnosis: [ ] not applicable     Interventions:   Recommend  [ ] Change Diet To:  [ ] Nutrition Supplement  [ ] Nutrition Support  [ ] Other:     Monitoring and Evaluation:   [ ] PO intake [ x ] Tolerance to diet prescription [ x ] weights [ x ] labs[ x ] follow up per protocol  [ ] other: Nutrition follow up ( chart reviewed, events noted) Brief hx: ( vascular surgery NP today 3/11) 71 yo female with pmhx of  A-fib, diabetes, hypertension, hyperlipidemia, end-stage renal disease hemodialysis, CHF, CAD/CABG, PVD sent in from [facility] for admission for right lower extremity angiogram . Now POD#1 femoral-femoral bypass. Since last nutrition assessment, SLP evaluated and upgraded to regular texture.    Pt not in room at time of RD visit. Pts bkfst tray still in room- plate waste study reveals pt at all of pancakes. per nsg flowsheet, consistently eats % of meals served      Factors impacting intake: [X ] none [ ] nausea  [ ] vomiting [ ] diarrhea [ ] constipation  [ ]chewing problems [ ] swallowing issues  [ ] other:     Diet Prescription: Diet, DASH/TLC:   Sodium & Cholesterol Restricted  Consistent Carbohydrate {No Snacks}  1000mL Fluid Restriction ([id number]) (03-10-23 @ 18:24)    Intake: %    Current Weight: Weight (kg): 50.3 (03-07 @ 12:53)    Pertinent Medications: MEDICATIONS  (STANDING):  aspirin enteric coated 81 milliGRAM(s) Oral daily  atorvastatin 40 milliGRAM(s) Oral at bedtime  cloNIDine 0.1 milliGRAM(s) Oral two times a day  dextrose 5%. 1000 milliLiter(s) (50 mL/Hr) IV Continuous <Continuous>  dextrose 5%. 1000 milliLiter(s) (100 mL/Hr) IV Continuous <Continuous>  dextrose 50% Injectable 25 Gram(s) IV Push once  dextrose 50% Injectable 12.5 Gram(s) IV Push once  dextrose 50% Injectable 25 Gram(s) IV Push once  epoetin fawad-epbx (RETACRIT) Injectable 95831 Unit(s) IV Push <User Schedule>  glucagon  Injectable 1 milliGRAM(s) IntraMuscular once  heparin   Injectable 5000 Unit(s) SubCutaneous every 12 hours  imipramine 50 milliGRAM(s) Oral daily  insulin glargine Injectable (LANTUS) 5 Unit(s) SubCutaneous at bedtime  insulin lispro (ADMELOG) corrective regimen sliding scale   SubCutaneous every 6 hours  metoprolol tartrate 100 milliGRAM(s) Oral at bedtime  multivitamin 1 Tablet(s) Oral daily  pantoprazole    Tablet 40 milliGRAM(s) Oral before breakfast  risperiDONE   Tablet 0.5 milliGRAM(s) Oral at bedtime  senna 2 Tablet(s) Oral at bedtime  sodium zirconium cyclosilicate 10 Gram(s) Oral <User Schedule>    MEDICATIONS  (PRN):  acetaminophen     Tablet .. 650 milliGRAM(s) Oral every 6 hours PRN Temp greater or equal to 38C (100.4F), Mild Pain (1 - 3)  aluminum hydroxide/magnesium hydroxide/simethicone Suspension 30 milliLiter(s) Oral every 4 hours PRN Dyspepsia  dextrose Oral Gel 15 Gram(s) Oral once PRN Blood Glucose LESS THAN 70 milliGRAM(s)/deciliter  melatonin 3 milliGRAM(s) Oral at bedtime PRN Insomnia  ondansetron Injectable 4 milliGRAM(s) IV Push every 8 hours PRN Nausea and/or Vomiting  traMADol 50 milliGRAM(s) Oral four times a day PRN Moderate Pain (4 - 6)    Pertinent Labs: 03-11 Na136 mmol/L Glu 174 mg/dL<H> K+ 4.8 mmol/L Cr  8.70 mg/dL<H> BUN 86 mg/dL<H> 03-08 Alb 2.7 g/dL<L>     CAPILLARY BLOOD GLUCOSE      POCT Blood Glucose.: 164 mg/dL (11 Mar 2023 06:14)  POCT Blood Glucose.: 200 mg/dL (10 Mar 2023 23:53)  POCT Blood Glucose.: 129 mg/dL (10 Mar 2023 21:56)  POCT Blood Glucose.: 150 mg/dL (10 Mar 2023 17:55)  POCT Blood Glucose.: 164 mg/dL (10 Mar 2023 15:54)  POCT Blood Glucose.: 163 mg/dL (10 Mar 2023 11:48)    Skin: R 2nd toe diabetic ulcer  BM: 3/9  edema: no    Estimated Needs:   [X ] no change since previous assessment, based on IBW of 65.7 kg ( 30-35 kcals/kg) 0678-2388 kcals and ( 1.2-1.4 gm pro/kg)        Previous Nutrition Diagnosis:   [ ] Inadequate Energy Intake [ ]Inadequate Oral Intake [ ] Excessive Energy Intake   [ ] Underweight [ ] Increased Nutrient Needs [ ] Overweight/Obesity   [ ] Altered GI Function [ ] Unintended Weight Loss [ ] Food & Nutrition Related Knowledge Deficit [X ] Malnutrition , moderate in context of chronic illness    Nutrition Diagnosis is [ X] ongoing  [ ] resolved [ ] not applicable     New Nutrition Diagnosis: [ ] not applicable     Interventions:   Recommend  [X ] Change Diet To: consistent CHO ( evening snack) renal 1.5 L FR with nepro 8 oz po BID  [ ] Nutrition Supplement  [ ] Nutrition Support  [ ] Other:     Monitoring and Evaluation:   [X ] PO intake [ x ] Tolerance to diet prescription [ x ] weights [ x ] labs[ x ] follow up per protocol  [X ] other: skin integrity

## 2023-03-11 NOTE — PROGRESS NOTE ADULT - PROBLEM SELECTOR PLAN 3
Accuchecks monitoring and insulin corrective regimen  sliding scale coverage with short acting inslulin, add longacting insulin as needed ,no concentrated sweets diet, serial labs ,HbA1C,education

## 2023-03-11 NOTE — PROGRESS NOTE ADULT - SUBJECTIVE AND OBJECTIVE BOX
SHILO KOHLER    PLV 1EAS 114 D1    Allergies    latex (Hives)  No Known Drug Allergies    Intolerances        PAST MEDICAL & SURGICAL HISTORY:  HTN (hypertension)      h/o Anxiety attack      Depression      h/o Myocardial infarct 2007      h/o Hepatitis A 1969  currently resolved, no symptoms      Murmur, cardiac      h/o Smoking  quitted 3/2012      Anemia secondary to renal failure      ESRD on dialysis      Falls      Ataxia      Type 2 diabetes mellitus      Peripheral vascular disease, unspecified      CAD (coronary artery disease)      Diabetes      coronary stent 2007      s/p Ovarian cyst removal      s/p surgical removal of benign Skin lesion epigastric area      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          FAMILY HISTORY:  FH: myocardial infarction (Father)    FH: myocardial infarction (Father)    Family history of type 2 diabetes mellitus in brother (Sibling)        Home Medications:  acetaminophen 325 mg oral tablet: 2 tab(s) orally every 6 hours, As needed, Temp greater or equal to 38C (100.4F), Mild Pain (1 - 3) (08 Mar 2023 12:30)  Admelog SoloStar 100 units/mL injectable solution: inject subcutaneous route 3 times a day per sliding scale  (08 Mar 2023 12:30)  aspirin 81 mg oral delayed release tablet: 1 tab(s) orally once a day (at bedtime) (08 Mar 2023 12:30)  atorvastatin 40 mg oral tablet: 1 tab(s) orally once a day (at bedtime) (08 Mar 2023 12:30)  cloNIDine 0.1 mg oral tablet: 1 tab(s) orally 2 times a day (08 Mar 2023 12:30)  imipramine 50 mg oral tablet: 1 tab(s) orally once a day (08 Mar 2023 12:30)  Lantus Solostar Pen 100 units/mL subcutaneous solution: 20 unit(s) subcutaneous once a day (at bedtime) (08 Mar 2023 12:30)  Lokelma 10 g oral powder for reconstitution: 10 gram(s) orally 2 times a week on Wed and Sat  (08 Mar 2023 12:30)  Melatonin 3 mg oral tablet: 1 tab(s) orally once a day (at bedtime), As Needed (08 Mar 2023 12:30)  metoprolol tartrate 100 mg oral tablet: 1 tab(s) orally once a day (at bedtime) (08 Mar 2023 12:30)  Nephro-Alfie oral tablet: 1 tab(s) orally once a day (08 Mar 2023 12:30)  PANTOPRAZOLE 40MG DR TAB: 1 tab(s) orally once a day (08 Mar 2023 12:30)  risperiDONE 0.5 mg oral tablet: 1 tab(s) orally once a day (at bedtime) (08 Mar 2023 12:30)  Senna 8.6 mg oral tablet: 1 tab(s) orally once a day (at bedtime) (08 Mar 2023 12:30)  silver sulfADIAZINE 1% topical cream: Apply topically to affected area once a day (08 Mar 2023 12:30)      MEDICATIONS  (STANDING):  aspirin enteric coated 81 milliGRAM(s) Oral daily  atorvastatin 40 milliGRAM(s) Oral at bedtime  cloNIDine 0.1 milliGRAM(s) Oral two times a day  dextrose 5%. 1000 milliLiter(s) (50 mL/Hr) IV Continuous <Continuous>  dextrose 5%. 1000 milliLiter(s) (100 mL/Hr) IV Continuous <Continuous>  dextrose 50% Injectable 25 Gram(s) IV Push once  dextrose 50% Injectable 12.5 Gram(s) IV Push once  dextrose 50% Injectable 25 Gram(s) IV Push once  epoetin fawad-epbx (RETACRIT) Injectable 99199 Unit(s) IV Push <User Schedule>  glucagon  Injectable 1 milliGRAM(s) IntraMuscular once  heparin   Injectable 5000 Unit(s) SubCutaneous every 12 hours  imipramine 50 milliGRAM(s) Oral daily  insulin glargine Injectable (LANTUS) 5 Unit(s) SubCutaneous at bedtime  insulin lispro (ADMELOG) corrective regimen sliding scale   SubCutaneous every 6 hours  metoprolol tartrate 100 milliGRAM(s) Oral at bedtime  multivitamin 1 Tablet(s) Oral daily  pantoprazole    Tablet 40 milliGRAM(s) Oral before breakfast  risperiDONE   Tablet 0.5 milliGRAM(s) Oral at bedtime  senna 2 Tablet(s) Oral at bedtime  sodium zirconium cyclosilicate 10 Gram(s) Oral <User Schedule>    MEDICATIONS  (PRN):  acetaminophen     Tablet .. 650 milliGRAM(s) Oral every 6 hours PRN Temp greater or equal to 38C (100.4F), Mild Pain (1 - 3)  aluminum hydroxide/magnesium hydroxide/simethicone Suspension 30 milliLiter(s) Oral every 4 hours PRN Dyspepsia  dextrose Oral Gel 15 Gram(s) Oral once PRN Blood Glucose LESS THAN 70 milliGRAM(s)/deciliter  melatonin 3 milliGRAM(s) Oral at bedtime PRN Insomnia  ondansetron Injectable 4 milliGRAM(s) IV Push every 8 hours PRN Nausea and/or Vomiting  traMADol 50 milliGRAM(s) Oral four times a day PRN Moderate Pain (4 - 6)      Diet, DASH/TLC:   Sodium & Cholesterol Restricted  Consistent Carbohydrate No Snacks  1000mL Fluid Restriction ([id number]) (03-10-23 @ 18:24) [Active]          Vital Signs Last 24 Hrs  T(C): 36.7 (11 Mar 2023 05:15), Max: 36.8 (10 Mar 2023 22:00)  T(F): 98.1 (11 Mar 2023 05:15), Max: 98.2 (10 Mar 2023 22:00)  HR: 70 (11 Mar 2023 05:15) (62 - 71)  BP: 150/64 (11 Mar 2023 05:15) (138/53 - 189/76)  BP(mean): --  RR: 17 (11 Mar 2023 05:15) (10 - 18)  SpO2: 92% (11 Mar 2023 05:15) (92% - 100%)    Parameters below as of 11 Mar 2023 05:15  Patient On (Oxygen Delivery Method): room air                  LABS:                        10.2   9.94  )-----------( 222      ( 11 Mar 2023 06:48 )             30.9     03-11    136  |  97  |  86<H>  ----------------------------<  174<H>  4.8   |  29  |  8.70<H>    Ca    8.8      11 Mar 2023 06:48                WBC:  WBC Count: 9.94 K/uL (03-11 @ 06:48)  WBC Count: 9.12 K/uL (03-10 @ 08:17)  WBC Count: 10.14 K/uL (03-09 @ 08:30)  WBC Count: 6.73 K/uL (03-08 @ 07:33)  WBC Count: 8.61 K/uL (03-07 @ 13:50)      MICROBIOLOGY:  RECENT CULTURES:                  Sodium:  Sodium, Serum: 136 mmol/L (03-11 @ 06:48)  Sodium, Serum: 136 mmol/L (03-10 @ 08:17)  Sodium, Serum: 135 mmol/L (03-09 @ 07:05)  Sodium, Serum: 135 mmol/L (03-08 @ 07:33)  Sodium, Serum: 132 mmol/L (03-07 @ 13:50)      8.70 mg/dL 03-11 @ 06:48  7.30 mg/dL 03-10 @ 08:17  5.50 mg/dL 03-09 @ 07:05  9.80 mg/dL 03-08 @ 07:33  8.50 mg/dL 03-07 @ 13:50      Hemoglobin:  Hemoglobin: 10.2 g/dL (03-11 @ 06:48)  Hemoglobin: 10.1 g/dL (03-10 @ 08:17)  Hemoglobin: 10.9 g/dL (03-09 @ 08:30)  Hemoglobin: 9.6 g/dL (03-08 @ 07:33)  Hemoglobin: 10.9 g/dL (03-07 @ 13:50)      Platelets: Platelet Count - Automated: 222 K/uL (03-11 @ 06:48)  Platelet Count - Automated: 195 K/uL (03-10 @ 08:17)  Platelet Count - Automated: 191 K/uL (03-09 @ 08:30)  Platelet Count - Automated: 159 K/uL (03-08 @ 07:33)  Platelet Count - Automated: 166 K/uL (03-07 @ 13:50)              RADIOLOGY & ADDITIONAL STUDIES:      MICROBIOLOGY:  RECENT CULTURES:

## 2023-03-11 NOTE — DISCHARGE NOTE PROVIDER - DETAILS OF MALNUTRITION DIAGNOSIS/DIAGNOSES
This patient has been assessed with a concern for Malnutrition and was treated during this hospitalization for the following Nutrition diagnosis/diagnoses:     -  03/08/2023: Moderate protein-calorie malnutrition   -  03/08/2023: Underweight (BMI < 19)

## 2023-03-11 NOTE — DISCHARGE NOTE PROVIDER - NSDCCPCAREPLAN_GEN_ALL_CORE_FT
PRINCIPAL DISCHARGE DIAGNOSIS  Diagnosis: Peripheral vascular disease  Assessment and Plan of Treatment:       SECONDARY DISCHARGE DIAGNOSES  Diagnosis: HTN (hypertension)  Assessment and Plan of Treatment:     Diagnosis: DM2 (diabetes mellitus, type 2)  Assessment and Plan of Treatment:     Diagnosis: Hyperkalemia  Assessment and Plan of Treatment:     Diagnosis: Afib  Assessment and Plan of Treatment:     Diagnosis: CAD (coronary artery disease)  Assessment and Plan of Treatment:     Diagnosis: Afib  Assessment and Plan of Treatment:     Diagnosis: ESRD on hemodialysis  Assessment and Plan of Treatment:     Diagnosis: PAD (peripheral artery disease)  Assessment and Plan of Treatment:

## 2023-03-11 NOTE — PROGRESS NOTE ADULT - PROVIDER SPECIALTY LIST ADULT
Nephrology
Nephrology
Pulmonology
Cardiology
Pulmonology
Pulmonology
Cardiology
Nephrology
Pulmonology
Vascular Surgery
Cardiology
Vascular Surgery
Nephrology
Hospitalist

## 2023-03-11 NOTE — DISCHARGE NOTE PROVIDER - NSDCCAREPROVSEEN_GEN_ALL_CORE_FT
Jacob, Joseluis Green, Adilene Barahona, Justine Pierre, Eloisa Larson, Diamond Landeros, Art Christensen, Sayra Mehta, Stanley Weil, Roxana

## 2023-03-11 NOTE — PROGRESS NOTE ADULT - PROBLEM SELECTOR PLAN 6
continue home medications ,cardi clearance requested

## 2023-03-11 NOTE — PROGRESS NOTE ADULT - PROBLEM SELECTOR PLAN 1
Seen  by vascular teem 02/24 during previous admission PAD S/P R-->L bifem-fem BK pop bypass, recent fempop bypass (6/21/2022), and partial ray amputation right great toe (6/22/2022), Further imaging /workup as per vacular team ,booked for angiogram 03/10/23-Patient is optimized from medical and cardiovascular standpoint to proceed with planned vascular interventions( angiogram/angioplasty)  with routine hemodynamic monitoring. 03/11- S/P RLE angiogram with incomplete visualization distal runoff  Pt with R fem-Pop bypass and difficult angiogram  Will obtain RLE arterial duplex for further evaluation  If sig below knee disease may need distal access approach for angiogram Patient is seen in HD unit , she is aware of plan and adamantly refusing to stay in a hospital to continue workup ,requests to go back to [facility] asa to see her  .Son Jeronimo is aware and is in agreement ,he requests further testing to be done as outpatient ,doesn't want inpatient admission if possible .D/w vascular PA -ok to discharge with outpatient f/up ,d/w med staff .RN ,SW

## 2023-03-11 NOTE — PROGRESS NOTE ADULT - NS ATTEND AMEND GEN_ALL_CORE FT
Agree with above. Will obtain arterial US to better eval tibial vessels  ( unable to eval during angio due to presence of fem fem and fem pop bypass)

## 2023-03-11 NOTE — DISCHARGE NOTE PROVIDER - NSDCCPTREATMENT_GEN_ALL_CORE_FT
PRINCIPAL PROCEDURE  Procedure: Angiogram, lower extremity, fluoroscopic, with topical ultrasound  Findings and Treatment:

## 2023-04-25 ENCOUNTER — EMERGENCY (EMERGENCY)
Facility: HOSPITAL | Age: 73
LOS: 1 days | Discharge: HOME | End: 2023-04-25
Attending: EMERGENCY MEDICINE | Admitting: EMERGENCY MEDICINE
Payer: MEDICARE

## 2023-04-25 VITALS
SYSTOLIC BLOOD PRESSURE: 146 MMHG | DIASTOLIC BLOOD PRESSURE: 81 MMHG | TEMPERATURE: 98 F | OXYGEN SATURATION: 97 % | HEART RATE: 86 BPM | HEIGHT: 69 IN | RESPIRATION RATE: 18 BRPM

## 2023-04-25 VITALS
HEART RATE: 75 BPM | SYSTOLIC BLOOD PRESSURE: 164 MMHG | RESPIRATION RATE: 18 BRPM | TEMPERATURE: 98 F | DIASTOLIC BLOOD PRESSURE: 79 MMHG | OXYGEN SATURATION: 100 %

## 2023-04-25 DIAGNOSIS — Z89.411 ACQUIRED ABSENCE OF RIGHT GREAT TOE: Chronic | ICD-10-CM

## 2023-04-25 PROCEDURE — 70450 CT HEAD/BRAIN W/O DYE: CPT | Mod: 26,MA

## 2023-04-25 PROCEDURE — 82962 GLUCOSE BLOOD TEST: CPT

## 2023-04-25 PROCEDURE — 72125 CT NECK SPINE W/O DYE: CPT | Mod: MA

## 2023-04-25 PROCEDURE — 70450 CT HEAD/BRAIN W/O DYE: CPT | Mod: MA

## 2023-04-25 PROCEDURE — 99284 EMERGENCY DEPT VISIT MOD MDM: CPT | Mod: 25

## 2023-04-25 PROCEDURE — 72125 CT NECK SPINE W/O DYE: CPT | Mod: 26,MA

## 2023-04-25 PROCEDURE — 99284 EMERGENCY DEPT VISIT MOD MDM: CPT

## 2023-04-25 NOTE — ED PROVIDER NOTE - PATIENT PORTAL LINK FT
You can access the FollowMyHealth Patient Portal offered by [facility] by registering at the following website: [email or web address]. By joining [employer]’s FollowMyHealth portal, you will also be able to view your health information using other applications (apps) compatible with our system.

## 2023-04-25 NOTE — ED ADULT NURSE NOTE - OBJECTIVE STATEMENT
pt BIBEMS from [facility] SNF/rehab s/p unwitnessed fall, on aspirin 81mg. pt found on floor at facility. pt a&ox2, clear speech, +ROM UE, equal strength b/l. pt oriented to self/time, re-oriented pt to place/location, pt c/o neck pain on & off, skin appropriate for ethnicity, no obvious ecchymosis/lacs/bleeding/swelling noted. pt respirations even/unlabored, 92% on RA, placed on 2L NC.

## 2023-04-25 NOTE — ED PROVIDER NOTE - NSFOLLOWUPINSTRUCTIONS_ED_ALL_ED_FT
-- You should update your primary care physician on your Emergency Department visit and follow up with them.  If you do not have a physician or have difficulty following up, please call: [phone] to obtain a [facility] doctor or specialist who can provide follow up.    -- Return to the ER for worsening or persistent symptoms, and/or ANY NEW OR CONCERNING SYMPTOMS.

## 2023-04-25 NOTE — ED PROVIDER NOTE - OBJECTIVE STATEMENT
pt from SNF s/p witnessed fall today, + head trauma, pt unable to provide reliable HPI. h/o ESRD on dialysis Tu, Th, Sa

## 2023-04-25 NOTE — ED PROVIDER NOTE - PHYSICAL EXAMINATION
Gen: alert, NAD  HEENT:  NC/AT, PERR, no exophthalmos  CV:  well perfused, rrr   Pulm:  normal RR, breathing comfortably, CTA b/l  Abd: s/nt/nd  MSK: moving all extremities, no signs of traumatic injury  Neuro:  non-focal  Skin:  visualized areas intact  Psych: AOx1

## 2023-04-25 NOTE — ED ADULT NURSE NOTE - NSIMPLEMENTINTERV_GEN_ALL_ED
Implemented All Fall with Harm Risk Interventions:  [place] to call system. Call bell, personal items and telephone within reach. Instruct patient to call for assistance. Room bathroom lighting operational. Non-slip footwear when patient is off stretcher. Physically safe environment: no spills, clutter or unnecessary equipment. Stretcher in lowest position, wheels locked, appropriate side rails in place. Provide visual cue, wrist band, yellow gown, etc. Monitor gait and stability. Monitor for mental status changes and reorient to person, place, and time. Review medications for side effects contributing to fall risk. Reinforce activity limits and safety measures with patient and family. Provide visual clues: red socks.

## 2023-04-25 NOTE — ED ADULT TRIAGE NOTE - CHIEF COMPLAINT QUOTE
BIBA ([facility]) from [facility] for an "unwitnessed fall".  takes asa daily.  Per EMS, [facility] states she hit her head in the fall (but is was not witnessed).  no visible laceration.  no complaints of pain.  patient is confused at baseline

## 2023-04-25 NOTE — ED ADULT NURSE NOTE - CHIEF COMPLAINT QUOTE
BIBA ([facility]) from [facility] for an "unwitnessed fall".  takes asa daily.  Per EMS, [facility] states she hit her head in the fall (but is was not witnessed).  no visible laceration.  no complaints of pain.  patient is confused at baseline

## 2023-05-01 NOTE — PROGRESS NOTE ADULT - PROBLEM SELECTOR PLAN 8
Frequent falls at home ,home situation seems to be not safe .patient lives with her elderly  and has history of noncompliance and missing dialysis . Admitted with falls and weakness .After improvement of symptoms and stabilization  patient will be required to be assessed   for rehabilitation . Social service input requested for MEGHAN placement . Patient is medically stable to be transferred to rehabilitation facility ,when the bed is available and arranged by social service .
Frequent falls at home ,home situation seems to be not safe .patient lives with her elderly  and has history of noncompliance and missing dialysis . Admitted with falls and weakness .After improvement of symptoms and stabilization  patient will be required to be assessed   for rehabilitation . Social service input requested for MEGHAN placement . Patient is medically stable to be transferred to rehabilitation facility ,when the bed is available and arranged by social service .
Subjective:   Marianna Cruz was seen today for New Patient    Patient has history of hypertension for last 10 years. Blood pressures been stable on amlodipine 5 mg once a day and losartan 50 mg once a day. Patient is also on aspirin 81 mg once a day. Patient also has history of hyperlipidemia for about 10 years. As per patient cholesterol has been stable on simvastatin 40 mg once a day. History of IBS. Patient gives history of chronic genital herpes. Patient is stable on  Valtrex 500 mg once a day without any flareups. Patient has history of hep C positive in 1997, treated by infectious disease. Patient had colonoscopy done 3 years ago. As per patient it was normal, to be repeated in 10 years. Patient smokes 1 pack/day for more than 30 years. Does not take alcohol or do drugs. Patient was admitted to consultants. Patient exercises regularly, walks about 2 to 3 miles a day. Patient history of right popliteal bypass in 2009. Since then patient has bilateral trace pedal edema right more than left. Currently patient does not have any chest pain or shortness of breath or palpitations. No pulmonary/GI symptoms. No neurovascular complaints. 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    Hepatitis C     Herpes genitalia     Hypertension        Past Surgical History:   Procedure Laterality Date    APPENDECTOMY      ARTERIAL BYPASS SURGRY Right     2010       Family History   Problem Relation Age of Onset    Heart Disease Mother     Dementia Mother     Prostate Cancer Father        Social History     Socioeconomic History    Marital status:       Spouse name: Not on file    Number of children: Not on file    Years of education: Not on file    Highest education level: Not on file   Occupational History    Not on file   Tobacco Use    Smoking status: Every Day     Types: Cigarettes    Smokeless tobacco: Current   Vaping Use    Vaping
Yolanda Lion presents today for   Chief Complaint   Patient presents with    New Patient                 1. \"Have you been to the ER, urgent care clinic since your last visit? Hospitalized since your last visit? \" no    2. \"Have you seen or consulted any other health care providers outside of the [address] [place] [address] since your last visit? \" no     3. For patients aged 39-70: Has the patient had a colonoscopy / FIT/ Cologuard? Yes - no Care Gap present      If the patient is female:    4. For patients aged 41-77: Has the patient had a mammogram within the past 2 years? NA - based on age or sex      11. For patients aged 21-65: Has the patient had a pap smear?  NA - based on age or sex
Frequent falls at home ,home situation seems to be not safe .patient lives with her elderly  and has history of noncompliance and missing dialysis . Admitted with falls and weakness .After improvement of symptoms and stabilization  patient will be required to be assessed   for rehabilitation . Social service input requested for MEGHAN placement . Patient is medically stable to be transferred to rehabilitation facility ,when the bed is available and arranged by social service .
COUGH/FEVER

## 2023-05-06 NOTE — PATIENT PROFILE ADULT. - VISION (WITH CORRECTIVE LENSES IF THE PATIENT USUALLY WEARS THEM):
Normal vision: sees adequately in most situations; can see medication labels, newsprint VTE Assessment already completed for this visit

## 2023-05-10 NOTE — PROGRESS NOTE ADULT - SUBJECTIVE AND OBJECTIVE BOX
Patient is a 71y old  Female who presents with a chief complaint of SIRS (27 Jun 2022 10:34)      INTERVAL History of Present Illness/OVERNIGHT EVENTS: RRT this AM for bradycardia   Tx to ICU for Dopamine drip and further monitoring.    MEDICATIONS  (STANDING):  aspirin enteric coated 81 milliGRAM(s) Oral daily  atorvastatin 40 milliGRAM(s) Oral at bedtime  calcium acetate 667 milliGRAM(s) Oral three times a day with meals  cefTRIAXone   IVPB 2000 milliGRAM(s) IV Intermittent every 24 hours  chlorhexidine 4% Liquid 1 Application(s) Topical <User Schedule>  cloNIDine 0.1 milliGRAM(s) Oral two times a day  clopidogrel Tablet 75 milliGRAM(s) Oral daily  dextrose 5%. 1000 milliLiter(s) (50 mL/Hr) IV Continuous <Continuous>  dextrose 50% Injectable 25 Gram(s) IV Push once  DOPamine Infusion 5 MICROgram(s)/kG/Min (10.1 mL/Hr) IV Continuous <Continuous>  epoetin fawad-epbx (RETACRIT) Injectable 49065 Unit(s) IV Push <User Schedule>  glucagon  Injectable 1 milliGRAM(s) IntraMuscular once  heparin   Injectable 5000 Unit(s) SubCutaneous every 12 hours  imipramine 50 milliGRAM(s) Oral daily  insulin glargine Injectable (LANTUS) 15 Unit(s) SubCutaneous at bedtime  insulin lispro (ADMELOG) corrective regimen sliding scale   SubCutaneous three times a day before meals  insulin lispro Injectable (ADMELOG) 3 Unit(s) SubCutaneous three times a day before meals  pantoprazole    Tablet 40 milliGRAM(s) Oral before breakfast    MEDICATIONS  (PRN):  acetaminophen     Tablet .. 650 milliGRAM(s) Oral every 6 hours PRN Temp greater or equal to 38C (100.4F), Mild Pain (1 - 3)  aluminum hydroxide/magnesium hydroxide/simethicone Suspension 30 milliLiter(s) Oral every 4 hours PRN Dyspepsia  dextrose Oral Gel 15 Gram(s) Oral once PRN Blood Glucose LESS THAN 70 milliGRAM(s)/deciliter  diphenhydrAMINE Injectable 25 milliGRAM(s) IV Push <User Schedule> PRN Combative behavior  melatonin 3 milliGRAM(s) Oral at bedtime PRN Insomnia      Allergies    latex (Unknown)  No Known Drug Allergies    Intolerances        REVIEW OF SYSTEMS:  Negative unless otherwise specified above.    Vital Signs Last 24 Hrs  T(C): 36.1 (27 Jun 2022 12:00), Max: 37.7 (26 Jun 2022 21:37)  T(F): 97 (27 Jun 2022 12:00), Max: 99.9 (26 Jun 2022 21:37)  HR: 68 (27 Jun 2022 12:50) (39 - 85)  BP: 102/49 (27 Jun 2022 12:50) (81/40 - 150/62)  BP(mean): 70 (27 Jun 2022 12:50) (57 - 74)  RR: 14 (27 Jun 2022 12:50) (14 - 26)  SpO2: 95% (27 Jun 2022 12:50) (91% - 98%)        PHYSICAL EXAM:  GENERAL: No apparent distress, appears drowsy  HEAD:  Atraumatic, Normocephalic  EYES: Conjunctiva and sclera clear, no discharge  NECK: Supple, no JVD  CHEST/LUNG: Air entry bilaterally  HEART: Regular rhythm, no rubs or gallops  ABDOMEN: Soft, Nontender, Nondistended  EXTREMITIES:  No clubbing, cyanosis or edema  SKIN: Right 1st toe amputation, stitches right thigh site  NERVOUS SYSTEM:  Alert & Oriented; Bilateral Lower extremity mobile, sensation to light touch intact      LABS:                        8.7    17.10 )-----------( 318      ( 27 Jun 2022 11:40 )             27.6     27 Jun 2022 11:40    129    |  92     |  73     ----------------------------<  264    4.5     |  27     |  6.30     Ca    12.3       27 Jun 2022 11:40  Phos  2.2       27 Jun 2022 11:40  Mg     2.9       27 Jun 2022 11:40    TPro  6.5    /  Alb  2.3    /  TBili  0.6    /  DBili  x      /  AST  32     /  ALT  10     /  AlkPhos  105    27 Jun 2022 11:40        CAPILLARY BLOOD GLUCOSE      POCT Blood Glucose.: 254 mg/dL (27 Jun 2022 11:46)  POCT Blood Glucose.: 257 mg/dL (27 Jun 2022 09:57)  POCT Blood Glucose.: 283 mg/dL (27 Jun 2022 07:27)  POCT Blood Glucose.: 319 mg/dL (26 Jun 2022 21:02)  POCT Blood Glucose.: 359 mg/dL (26 Jun 2022 16:51)      RADIOLOGY & ADDITIONAL TESTS:      Images reviewed personally    Consultant Notes Reviewed and Care Discussed with relevant Consultants. n/a

## 2023-05-17 ENCOUNTER — INPATIENT (INPATIENT)
Facility: HOSPITAL | Age: 73
LOS: 6 days | Discharge: NURSING, REHAB OR LONG-TERM CARE | DRG: 193 | End: 2023-05-24
Attending: INTERNAL MEDICINE | Admitting: INTERNAL MEDICINE
Payer: MEDICARE

## 2023-05-17 VITALS
RESPIRATION RATE: 20 BRPM | HEIGHT: 69 IN | HEART RATE: 134 BPM | TEMPERATURE: 98 F | DIASTOLIC BLOOD PRESSURE: 61 MMHG | SYSTOLIC BLOOD PRESSURE: 149 MMHG | WEIGHT: 130.07 LBS | OXYGEN SATURATION: 91 %

## 2023-05-17 DIAGNOSIS — I48.91 UNSPECIFIED ATRIAL FIBRILLATION: ICD-10-CM

## 2023-05-17 DIAGNOSIS — Z89.411 ACQUIRED ABSENCE OF RIGHT GREAT TOE: Chronic | ICD-10-CM

## 2023-05-17 DIAGNOSIS — F32.9 MAJOR DEPRESSIVE DISORDER, SINGLE EPISODE, UNSPECIFIED: ICD-10-CM

## 2023-05-17 DIAGNOSIS — I10 ESSENTIAL (PRIMARY) HYPERTENSION: ICD-10-CM

## 2023-05-17 DIAGNOSIS — R62.7 ADULT FAILURE TO THRIVE: ICD-10-CM

## 2023-05-17 DIAGNOSIS — J18.9 PNEUMONIA, UNSPECIFIED ORGANISM: ICD-10-CM

## 2023-05-17 DIAGNOSIS — I50.42 CHRONIC COMBINED SYSTOLIC (CONGESTIVE) AND DIASTOLIC (CONGESTIVE) HEART FAILURE: ICD-10-CM

## 2023-05-17 DIAGNOSIS — N18.6 END STAGE RENAL DISEASE: ICD-10-CM

## 2023-05-17 DIAGNOSIS — Z29.9 ENCOUNTER FOR PROPHYLACTIC MEASURES, UNSPECIFIED: ICD-10-CM

## 2023-05-17 DIAGNOSIS — B34.9 VIRAL INFECTION, UNSPECIFIED: ICD-10-CM

## 2023-05-17 LAB
ALBUMIN SERPL ELPH-MCNC: 3.1 G/DL — LOW (ref 3.3–5)
ALP SERPL-CCNC: 119 U/L — SIGNIFICANT CHANGE UP (ref 40–120)
ALT FLD-CCNC: 21 U/L — SIGNIFICANT CHANGE UP (ref 12–78)
ANION GAP SERPL CALC-SCNC: 3 MMOL/L — LOW (ref 5–17)
APTT BLD: 27.9 SEC — SIGNIFICANT CHANGE UP (ref 27.5–35.5)
AST SERPL-CCNC: 10 U/L — LOW (ref 15–37)
BASOPHILS # BLD AUTO: 0.1 K/UL — SIGNIFICANT CHANGE UP (ref 0–0.2)
BASOPHILS NFR BLD AUTO: 0.8 % — SIGNIFICANT CHANGE UP (ref 0–2)
BILIRUB SERPL-MCNC: 0.8 MG/DL — SIGNIFICANT CHANGE UP (ref 0.2–1.2)
BUN SERPL-MCNC: 28 MG/DL — HIGH (ref 7–23)
CALCIUM SERPL-MCNC: 9.6 MG/DL — SIGNIFICANT CHANGE UP (ref 8.5–10.1)
CHLORIDE SERPL-SCNC: 99 MMOL/L — SIGNIFICANT CHANGE UP (ref 96–108)
CO2 SERPL-SCNC: 31 MMOL/L — SIGNIFICANT CHANGE UP (ref 22–31)
CREAT SERPL-MCNC: 4.6 MG/DL — HIGH (ref 0.5–1.3)
EGFR: 9 ML/MIN/1.73M2 — LOW
EOSINOPHIL # BLD AUTO: 0.25 K/UL — SIGNIFICANT CHANGE UP (ref 0–0.5)
EOSINOPHIL NFR BLD AUTO: 2.1 % — SIGNIFICANT CHANGE UP (ref 0–6)
GLUCOSE SERPL-MCNC: 118 MG/DL — HIGH (ref 70–99)
GLUCOSE SERPL-MCNC: 157 MG/DL — HIGH (ref 70–99)
HCT VFR BLD CALC: 33.3 % — LOW (ref 34.5–45)
HGB BLD-MCNC: 10.5 G/DL — LOW (ref 11.5–15.5)
IMM GRANULOCYTES NFR BLD AUTO: 0.8 % — SIGNIFICANT CHANGE UP (ref 0–0.9)
INR BLD: 1.25 RATIO — HIGH (ref 0.88–1.16)
LACTATE SERPL-SCNC: 1 MMOL/L — SIGNIFICANT CHANGE UP (ref 0.7–2)
LYMPHOCYTES # BLD AUTO: 0.85 K/UL — LOW (ref 1–3.3)
LYMPHOCYTES # BLD AUTO: 7 % — LOW (ref 13–44)
MCHC RBC-ENTMCNC: 30.7 PG — SIGNIFICANT CHANGE UP (ref 27–34)
MCHC RBC-ENTMCNC: 31.5 GM/DL — LOW (ref 32–36)
MCV RBC AUTO: 97.4 FL — SIGNIFICANT CHANGE UP (ref 80–100)
MONOCYTES # BLD AUTO: 1.45 K/UL — HIGH (ref 0–0.9)
MONOCYTES NFR BLD AUTO: 11.9 % — SIGNIFICANT CHANGE UP (ref 2–14)
NEUTROPHILS # BLD AUTO: 9.43 K/UL — HIGH (ref 1.8–7.4)
NEUTROPHILS NFR BLD AUTO: 77.4 % — HIGH (ref 43–77)
NRBC # BLD: 0 /100 WBCS — SIGNIFICANT CHANGE UP (ref 0–0)
PLATELET # BLD AUTO: 354 K/UL — SIGNIFICANT CHANGE UP (ref 150–400)
POTASSIUM SERPL-MCNC: 3.9 MMOL/L — SIGNIFICANT CHANGE UP (ref 3.5–5.3)
POTASSIUM SERPL-SCNC: 3.9 MMOL/L — SIGNIFICANT CHANGE UP (ref 3.5–5.3)
PROCALCITONIN SERPL-MCNC: 0.9 NG/ML — HIGH
PROT SERPL-MCNC: 7 G/DL — SIGNIFICANT CHANGE UP (ref 6–8.3)
PROTHROM AB SERPL-ACNC: 14.6 SEC — HIGH (ref 10.5–13.4)
RAPID RVP RESULT: DETECTED
RBC # BLD: 3.42 M/UL — LOW (ref 3.8–5.2)
RBC # FLD: 15.4 % — HIGH (ref 10.3–14.5)
RV+EV RNA SPEC QL NAA+PROBE: DETECTED
SARS-COV-2 RNA SPEC QL NAA+PROBE: SIGNIFICANT CHANGE UP
SODIUM SERPL-SCNC: 133 MMOL/L — LOW (ref 135–145)
WBC # BLD: 12.18 K/UL — HIGH (ref 3.8–10.5)
WBC # FLD AUTO: 12.18 K/UL — HIGH (ref 3.8–10.5)

## 2023-05-17 PROCEDURE — 71045 X-RAY EXAM CHEST 1 VIEW: CPT | Mod: 26

## 2023-05-17 PROCEDURE — 71250 CT THORAX DX C-: CPT | Mod: 26,MF

## 2023-05-17 PROCEDURE — 99222 1ST HOSP IP/OBS MODERATE 55: CPT

## 2023-05-17 PROCEDURE — 99285 EMERGENCY DEPT VISIT HI MDM: CPT

## 2023-05-17 PROCEDURE — G1004: CPT

## 2023-05-17 PROCEDURE — 99223 1ST HOSP IP/OBS HIGH 75: CPT

## 2023-05-17 RX ORDER — HEPARIN SODIUM 5000 [USP'U]/ML
5000 INJECTION INTRAVENOUS; SUBCUTANEOUS EVERY 12 HOURS
Refills: 0 | Status: DISCONTINUED | OUTPATIENT
Start: 2023-05-17 | End: 2023-05-19

## 2023-05-17 RX ORDER — PIPERACILLIN AND TAZOBACTAM 4; .5 G/20ML; G/20ML
3.38 INJECTION, POWDER, LYOPHILIZED, FOR SOLUTION INTRAVENOUS ONCE
Refills: 0 | Status: COMPLETED | OUTPATIENT
Start: 2023-05-17 | End: 2023-05-17

## 2023-05-17 RX ORDER — SENNA PLUS 8.6 MG/1
1 TABLET ORAL
Qty: 0 | Refills: 0 | DISCHARGE

## 2023-05-17 RX ORDER — DILTIAZEM HCL 120 MG
5 CAPSULE, EXT RELEASE 24 HR ORAL
Qty: 125 | Refills: 0 | Status: DISCONTINUED | OUTPATIENT
Start: 2023-05-17 | End: 2023-05-19

## 2023-05-17 RX ORDER — LANOLIN ALCOHOL/MO/W.PET/CERES
1 CREAM (GRAM) TOPICAL
Qty: 0 | Refills: 0 | DISCHARGE

## 2023-05-17 RX ORDER — DEXTROSE 50 % IN WATER 50 %
25 SYRINGE (ML) INTRAVENOUS ONCE
Refills: 0 | Status: DISCONTINUED | OUTPATIENT
Start: 2023-05-17 | End: 2023-05-24

## 2023-05-17 RX ORDER — PANTOPRAZOLE SODIUM 20 MG/1
40 TABLET, DELAYED RELEASE ORAL
Refills: 0 | Status: DISCONTINUED | OUTPATIENT
Start: 2023-05-17 | End: 2023-05-21

## 2023-05-17 RX ORDER — SODIUM CHLORIDE 9 MG/ML
1000 INJECTION, SOLUTION INTRAVENOUS
Refills: 0 | Status: DISCONTINUED | OUTPATIENT
Start: 2023-05-17 | End: 2023-05-24

## 2023-05-17 RX ORDER — DILTIAZEM HCL 120 MG
5 CAPSULE, EXT RELEASE 24 HR ORAL
Qty: 125 | Refills: 0 | Status: DISCONTINUED | OUTPATIENT
Start: 2023-05-17 | End: 2023-05-17

## 2023-05-17 RX ORDER — VANCOMYCIN HCL 1 G
1000 VIAL (EA) INTRAVENOUS ONCE
Refills: 0 | Status: COMPLETED | OUTPATIENT
Start: 2023-05-17 | End: 2023-05-17

## 2023-05-17 RX ORDER — CEFTRIAXONE 500 MG/1
1000 INJECTION, POWDER, FOR SOLUTION INTRAMUSCULAR; INTRAVENOUS EVERY 24 HOURS
Refills: 0 | Status: COMPLETED | OUTPATIENT
Start: 2023-05-17 | End: 2023-05-21

## 2023-05-17 RX ORDER — SODIUM CHLORIDE 9 MG/ML
1000 INJECTION INTRAMUSCULAR; INTRAVENOUS; SUBCUTANEOUS ONCE
Refills: 0 | Status: COMPLETED | OUTPATIENT
Start: 2023-05-17 | End: 2023-05-17

## 2023-05-17 RX ORDER — ONDANSETRON 8 MG/1
4 TABLET, FILM COATED ORAL EVERY 8 HOURS
Refills: 0 | Status: DISCONTINUED | OUTPATIENT
Start: 2023-05-17 | End: 2023-05-24

## 2023-05-17 RX ORDER — DEXTROSE 50 % IN WATER 50 %
12.5 SYRINGE (ML) INTRAVENOUS ONCE
Refills: 0 | Status: DISCONTINUED | OUTPATIENT
Start: 2023-05-17 | End: 2023-05-24

## 2023-05-17 RX ORDER — DILTIAZEM HCL 120 MG
10 CAPSULE, EXT RELEASE 24 HR ORAL ONCE
Refills: 0 | Status: COMPLETED | OUTPATIENT
Start: 2023-05-17 | End: 2023-05-17

## 2023-05-17 RX ORDER — RISPERIDONE 4 MG/1
1 TABLET ORAL
Qty: 0 | Refills: 0 | DISCHARGE

## 2023-05-17 RX ORDER — RISPERIDONE 4 MG/1
0.5 TABLET ORAL AT BEDTIME
Refills: 0 | Status: DISCONTINUED | OUTPATIENT
Start: 2023-05-17 | End: 2023-05-19

## 2023-05-17 RX ORDER — INSULIN LISPRO 100/ML
VIAL (ML) SUBCUTANEOUS
Refills: 0 | Status: DISCONTINUED | OUTPATIENT
Start: 2023-05-17 | End: 2023-05-24

## 2023-05-17 RX ORDER — METOPROLOL TARTRATE 50 MG
50 TABLET ORAL
Refills: 0 | Status: DISCONTINUED | OUTPATIENT
Start: 2023-05-17 | End: 2023-05-17

## 2023-05-17 RX ORDER — ACETAMINOPHEN 500 MG
650 TABLET ORAL EVERY 6 HOURS
Refills: 0 | Status: DISCONTINUED | OUTPATIENT
Start: 2023-05-17 | End: 2023-05-24

## 2023-05-17 RX ORDER — SENNA PLUS 8.6 MG/1
2 TABLET ORAL AT BEDTIME
Refills: 0 | Status: DISCONTINUED | OUTPATIENT
Start: 2023-05-17 | End: 2023-05-24

## 2023-05-17 RX ORDER — DILTIAZEM HCL 120 MG
30 CAPSULE, EXT RELEASE 24 HR ORAL THREE TIMES A DAY
Refills: 0 | Status: DISCONTINUED | OUTPATIENT
Start: 2023-05-17 | End: 2023-05-17

## 2023-05-17 RX ORDER — GLUCAGON INJECTION, SOLUTION 0.5 MG/.1ML
1 INJECTION, SOLUTION SUBCUTANEOUS ONCE
Refills: 0 | Status: DISCONTINUED | OUTPATIENT
Start: 2023-05-17 | End: 2023-05-24

## 2023-05-17 RX ORDER — PIPERACILLIN AND TAZOBACTAM 4; .5 G/20ML; G/20ML
3.38 INJECTION, POWDER, LYOPHILIZED, FOR SOLUTION INTRAVENOUS EVERY 12 HOURS
Refills: 0 | Status: DISCONTINUED | OUTPATIENT
Start: 2023-05-17 | End: 2023-05-17

## 2023-05-17 RX ORDER — METOPROLOL TARTRATE 50 MG
100 TABLET ORAL
Refills: 0 | Status: DISCONTINUED | OUTPATIENT
Start: 2023-05-17 | End: 2023-05-24

## 2023-05-17 RX ORDER — GABAPENTIN 400 MG/1
100 CAPSULE ORAL AT BEDTIME
Refills: 0 | Status: DISCONTINUED | OUTPATIENT
Start: 2023-05-17 | End: 2023-05-19

## 2023-05-17 RX ORDER — DEXTROSE 50 % IN WATER 50 %
15 SYRINGE (ML) INTRAVENOUS ONCE
Refills: 0 | Status: DISCONTINUED | OUTPATIENT
Start: 2023-05-17 | End: 2023-05-24

## 2023-05-17 RX ORDER — LANOLIN ALCOHOL/MO/W.PET/CERES
3 CREAM (GRAM) TOPICAL AT BEDTIME
Refills: 0 | Status: DISCONTINUED | OUTPATIENT
Start: 2023-05-17 | End: 2023-05-19

## 2023-05-17 RX ORDER — ATORVASTATIN CALCIUM 80 MG/1
40 TABLET, FILM COATED ORAL AT BEDTIME
Refills: 0 | Status: DISCONTINUED | OUTPATIENT
Start: 2023-05-17 | End: 2023-05-19

## 2023-05-17 RX ORDER — ASPIRIN/CALCIUM CARB/MAGNESIUM 324 MG
81 TABLET ORAL DAILY
Refills: 0 | Status: DISCONTINUED | OUTPATIENT
Start: 2023-05-17 | End: 2023-05-24

## 2023-05-17 RX ORDER — DILTIAZEM HCL 120 MG
5 CAPSULE, EXT RELEASE 24 HR ORAL ONCE
Refills: 0 | Status: COMPLETED | OUTPATIENT
Start: 2023-05-17 | End: 2023-05-17

## 2023-05-17 RX ADMIN — Medication 250 MILLIGRAM(S): at 11:31

## 2023-05-17 RX ADMIN — Medication 100 MILLIGRAM(S): at 17:55

## 2023-05-17 RX ADMIN — SODIUM CHLORIDE 1000 MILLILITER(S): 9 INJECTION INTRAMUSCULAR; INTRAVENOUS; SUBCUTANEOUS at 11:30

## 2023-05-17 RX ADMIN — Medication 3 MILLIGRAM(S): at 21:30

## 2023-05-17 RX ADMIN — Medication 10 MILLIGRAM(S): at 10:56

## 2023-05-17 RX ADMIN — Medication 5 MILLIGRAM(S): at 17:09

## 2023-05-17 RX ADMIN — SODIUM CHLORIDE 1000 MILLILITER(S): 9 INJECTION INTRAMUSCULAR; INTRAVENOUS; SUBCUTANEOUS at 09:39

## 2023-05-17 RX ADMIN — ATORVASTATIN CALCIUM 40 MILLIGRAM(S): 80 TABLET, FILM COATED ORAL at 21:30

## 2023-05-17 RX ADMIN — Medication 0.1 MILLIGRAM(S): at 17:55

## 2023-05-17 RX ADMIN — GABAPENTIN 100 MILLIGRAM(S): 400 CAPSULE ORAL at 21:30

## 2023-05-17 RX ADMIN — CEFTRIAXONE 100 MILLIGRAM(S): 500 INJECTION, POWDER, FOR SOLUTION INTRAMUSCULAR; INTRAVENOUS at 22:33

## 2023-05-17 RX ADMIN — Medication 5 MG/HR: at 23:33

## 2023-05-17 RX ADMIN — PIPERACILLIN AND TAZOBACTAM 200 GRAM(S): 4; .5 INJECTION, POWDER, LYOPHILIZED, FOR SOLUTION INTRAVENOUS at 10:57

## 2023-05-17 RX ADMIN — Medication 30 MILLIGRAM(S): at 15:53

## 2023-05-17 RX ADMIN — RISPERIDONE 0.5 MILLIGRAM(S): 4 TABLET ORAL at 22:34

## 2023-05-17 RX ADMIN — HEPARIN SODIUM 5000 UNIT(S): 5000 INJECTION INTRAVENOUS; SUBCUTANEOUS at 17:55

## 2023-05-17 RX ADMIN — Medication 10 MILLIGRAM(S): at 09:30

## 2023-05-17 RX ADMIN — SENNA PLUS 2 TABLET(S): 8.6 TABLET ORAL at 21:28

## 2023-05-17 NOTE — ED ADULT TRIAGE NOTE - CHIEF COMPLAINT QUOTE
Patient BIBA from [facility] for CT of lungs. Per EMS, facility states that MD Larson is expecting the patients and wants the CT lungs to R/o PNA. Patient BIBA from [facility] for CT of lungs. Per EMS, facility states that MD Larson is expecting the patients and wants the CT lungs to R/o PNA. Patient c/o cough. RUE limb alert bracelet placed for dialysis fistula. Patient doesn't remember last hemodialysis.

## 2023-05-17 NOTE — H&P ADULT - PROBLEM SELECTOR PLAN 2
Admit to monitored unit for cardiac monitoring, obtain echo to evaluate LVEF, intravenous diuresis prn as per card consult , monitor ins/outs, monitor renal profile and electrolytes closely ,send 3 sets of enzymes, O2 supply, serial chest xrays, monitor weights and oral intake of fluids, nutritionist consult

## 2023-05-17 NOTE — CONSULT NOTE ADULT - SUBJECTIVE AND OBJECTIVE BOX
[facility]  INFECTIOUS DISEASES   [address] [place] [address]  Tel: [phone]     Fax: [phone]  ========================================================  MD Zita Alejandra Kaushal, MD Cho, Michelle, MD Sunjit, Jaspal, MD  ========================================================    [id number]  SHILO JOSE F     CC: Patient is a 72y old  Female who presents with a chief complaint of   HPI:  71yo woman with PMH of HTN, DM2, ESRD on HD, CAD, depression/anxiety and former smoker was seen in ED with cough for 2 days.   She is awake and alert, no SOB or chest pain, On O2 with Spo2 of 100%. She had dry cough with no sputum or fever.  In ED had RVP that was positive for enterovirus and also chest CT that showed bilateral effusion more in left with compressive atelectasis or   possible consolidation.   ID has been called for possible pneumonia.     PAST MEDICAL & SURGICAL HISTORY:  HTN (hypertension)  h/o Anxiety attack  Depression  h/o Myocardial infarct 2007  h/o Hepatitis A 1969  currently resolved, no symptoms  Murmur, cardiac  h/o Smoking  quitted 3/2012  Anemia secondary to renal failure  ESRD on dialysis  Falls  Ataxia  Type 2 diabetes mellitus  Peripheral vascular disease, unspecified  CAD (coronary artery disease)  Diabetes  coronary stent 2007  s/p Ovarian cyst removal  s/p surgical removal of benign Skin lesion epigastric area  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    Social Hx: Former smoker, no ETOH or drugs     FAMILY HISTORY:  FH: myocardial infarction (Father)    FH: myocardial infarction (Father)    Family history of type 2 diabetes mellitus in brother (Sibling)        Allergies    No Known Drug Allergies  latex (Hives)    Intolerances        Antibiotics:  MEDICATIONS  (STANDING):  aspirin enteric coated 81 milliGRAM(s) Oral daily  atorvastatin 40 milliGRAM(s) Oral at bedtime  cloNIDine 0.1 milliGRAM(s) Oral two times a day  dextrose 5%. 1000 milliLiter(s) (50 mL/Hr) IV Continuous <Continuous>  dextrose 5%. 1000 milliLiter(s) (100 mL/Hr) IV Continuous <Continuous>  dextrose 50% Injectable 12.5 Gram(s) IV Push once  dextrose 50% Injectable 25 Gram(s) IV Push once  dextrose 50% Injectable 25 Gram(s) IV Push once  diltiazem Infusion 5 mG/Hr (5 mL/Hr) IV Continuous <Continuous>  diltiazem Injectable 5 milliGRAM(s) IV Push once  gabapentin 100 milliGRAM(s) Oral at bedtime  glucagon  Injectable 1 milliGRAM(s) IntraMuscular once  heparin   Injectable 5000 Unit(s) SubCutaneous every 12 hours  imipramine 50 milliGRAM(s) Oral daily  insulin lispro (ADMELOG) corrective regimen sliding scale   SubCutaneous three times a day before meals  melatonin 3 milliGRAM(s) Oral at bedtime  metoprolol tartrate 100 milliGRAM(s) Oral two times a day  multivitamin 1 Tablet(s) Oral daily  pantoprazole    Tablet 40 milliGRAM(s) Oral before breakfast  piperacillin/tazobactam IVPB.. 3.375 Gram(s) IV Intermittent every 12 hours  risperiDONE   Tablet 0.5 milliGRAM(s) Oral at bedtime  senna 2 Tablet(s) Oral at bedtime    MEDICATIONS  (PRN):  acetaminophen     Tablet .. 650 milliGRAM(s) Oral every 6 hours PRN Temp greater or equal to 38C (100.4F), Mild Pain (1 - 3)  aluminum hydroxide/magnesium hydroxide/simethicone Suspension 30 milliLiter(s) Oral every 4 hours PRN Dyspepsia  dextrose Oral Gel 15 Gram(s) Oral once PRN Blood Glucose LESS THAN 70 milliGRAM(s)/deciliter  ondansetron Injectable 4 milliGRAM(s) IV Push every 8 hours PRN Nausea and/or Vomiting       REVIEW OF SYSTEMS:  CONSTITUTIONAL:  No Fever or chills  HEENT:  No diplopia or blurred vision.  No sore throat or runny nose.  CARDIOVASCULAR:  No chest pain or SOB.  RESPIRATORY:  No cough, shortness of breath, PND or orthopnea.  GASTROINTESTINAL:  No nausea, vomiting or diarrhea.  GENITOURINARY:  No dysuria, frequency or urgency. No Blood in urine  MUSCULOSKELETAL:  no joint aches, no muscle pain  SKIN:  No change in skin, hair or nails.  NEUROLOGIC:  No paresthesias or weakness.  PSYCHIATRIC:  No disorder of thought or mood.  ENDOCRINE:  No heat or cold intolerance, polyuria or polydipsia.  HEMATOLOGICAL:  No easy bruising or bleeding.     Physical Exam:  Vital Signs Last 24 Hrs  T(C): 37.3 (17 May 2023 08:55), Max: 37.3 (17 May 2023 08:55)  T(F): 99.1 (17 May 2023 08:55), Max: 99.1 (17 May 2023 08:55)  HR: 126 (17 May 2023 16:02) (99 - 134)  BP: 154/86 (17 May 2023 16:02) (148/67 - 156/74)  BP(mean): --  RR: 20 (17 May 2023 16:02) (16 - 20)  SpO2: 99% (17 May 2023 16:02) (91% - 99%)  Parameters below as of 17 May 2023 16:02  Patient On (Oxygen Delivery Method): nasal cannula  O2 Flow (L/min): 3  Height (cm): 175.3 (05-17 @ 08:32)  Weight (kg): 59 (05-17 @ 08:32)  BMI (kg/m2): 19.2 (05-17 @ 08:32)  BSA (m2): 1.72 (05-17 @ 08:32)  GEN: NAD  HEENT: normocephalic and atraumatic. EOMI. PERRL.    NECK: Supple.  No lymphadenopathy   LUNGS: Decreased breath sounds bilaterally more in lower lobes  with some crackles both side   HEART: Regular rate and rhythm   ABDOMEN: Soft, nontender, and nondistended.  Positive bowel sounds.    : No CVA tenderness  EXTREMITIES: Without edema.  NEUROLOGIC: grossly intact.  PSYCHIATRIC: Appropriate affect .  SKIN: No rash     Labs:  05-17    x   |  x   |  x   ----------------------------<  157<H>  x    |  x   |  x     Ca    9.6      17 May 2023 09:40    TPro  7.0  /  Alb  3.1<L>  /  TBili  0.8  /  DBili  x   /  AST  10<L>  /  ALT  21  /  AlkPhos  119  05-17                        10.5   12.18 )-----------( 354      ( 17 May 2023 09:40 )             33.3     PT/INR - ( 17 May 2023 09:40 )   PT: 14.6 sec;   INR: 1.25 ratio    PTT - ( 17 May 2023 09:40 )  PTT:27.9 sec    LIVER FUNCTIONS - ( 17 May 2023 09:40 )  Alb: 3.1 g/dL / Pro: 7.0 g/dL / ALK PHOS: 119 U/L / ALT: 21 U/L / AST: 10 U/L / GGT: x           Procalcitonin, Serum: 0.90 ng/mL (05-17-23 @ 09:40)    SARS-CoV-2: NotDetec (05-17-23 @ 09:40)    RECENT CULTURES:  05-17 @ 09:40      Detected    All imaging and other data have been reviewed.  < from: CT Chest No Cont (05.17.23 @ 10:15) >  IMPRESSION:  Small to moderate right-sided pleural effusion and associated compressive   atelectasis.  Moderate left-sided pleural effusion with atelectasis/airspace   consolidation left lower lobe.  Groundglass and airspace consolidation posterior segment left upper lobe,   could reflect edema or superimposed infection.  Other findings as discussed above.    Assessment and Plan:   71yo woman with PMH of HTN, DM2, ESRD on HD, CAD, depression/anxiety and former smoker was seen in ED with cough for 2 days.   RVP positive for enterovirus  chest CT that showed bilateral effusion more in left with compressive atelectasis vs consolidation.   Procalcitonin 0.9  WBC on admission 12k  Tmax 99.1  This could be just viral infection causing cough but since Chest CT has questionable consolidations, will cover for CAP.   Also procalcitonin is slightly high due to ESRD.     Recommendations:  - Will follow Labs and cultures   - Will start on ceftriaxone 1gm daily  - Will send Legionella U ag and MRSA PCR  - Cardiology/pulmonary consults    Thank you for courtesy of this consult.     Will follow.  Discussed with the primary team.     Geovany Long MD  Division of Infectious Diseases   Please call ID service at [phone] with any question.    75 minutes spent on total encounter assessing patient, examination, chart review, counseling and coordinating care by the attending physician/nurse/care manager.

## 2023-05-17 NOTE — CONSULT NOTE ADULT - SUBJECTIVE AND OBJECTIVE BOX
Patient is a 72y Female whom presented to the hospital with esrd on hd     PAST MEDICAL & SURGICAL HISTORY:  HTN (hypertension)      h/o Anxiety attack      Depression      h/o Myocardial infarct 2007      h/o Hepatitis A 1969  currently resolved, no symptoms      Murmur, cardiac      h/o Smoking  quitted 3/2012      Anemia secondary to renal failure      ESRD on dialysis      Falls      Ataxia      Type 2 diabetes mellitus      Peripheral vascular disease, unspecified      CAD (coronary artery disease)      Diabetes      coronary stent 2007      s/p Ovarian cyst removal      s/p surgical removal of benign Skin lesion epigastric area      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          MEDICATIONS  (STANDING):  aspirin enteric coated 81 milliGRAM(s) Oral daily  atorvastatin 40 milliGRAM(s) Oral at bedtime  cefTRIAXone   IVPB 1000 milliGRAM(s) IV Intermittent every 24 hours  cloNIDine 0.1 milliGRAM(s) Oral two times a day  dextrose 5%. 1000 milliLiter(s) (50 mL/Hr) IV Continuous <Continuous>  dextrose 5%. 1000 milliLiter(s) (100 mL/Hr) IV Continuous <Continuous>  dextrose 50% Injectable 12.5 Gram(s) IV Push once  dextrose 50% Injectable 25 Gram(s) IV Push once  dextrose 50% Injectable 25 Gram(s) IV Push once  diltiazem Infusion 5 mG/Hr (5 mL/Hr) IV Continuous <Continuous>  gabapentin 100 milliGRAM(s) Oral at bedtime  glucagon  Injectable 1 milliGRAM(s) IntraMuscular once  heparin   Injectable 5000 Unit(s) SubCutaneous every 12 hours  imipramine 50 milliGRAM(s) Oral daily  insulin lispro (ADMELOG) corrective regimen sliding scale   SubCutaneous three times a day before meals  melatonin 3 milliGRAM(s) Oral at bedtime  metoprolol tartrate 100 milliGRAM(s) Oral two times a day  multivitamin 1 Tablet(s) Oral daily  pantoprazole    Tablet 40 milliGRAM(s) Oral before breakfast  risperiDONE   Tablet 0.5 milliGRAM(s) Oral at bedtime  senna 2 Tablet(s) Oral at bedtime      Allergies    No Known Drug Allergies  latex (Hives)    Intolerances        SOCIAL HISTORY:  Denies ETOh,Smoking,     FAMILY HISTORY:  FH: myocardial infarction (Father)    FH: myocardial infarction (Father)    Family history of type 2 diabetes mellitus in brother (Sibling)        REVIEW OF SYSTEMS:    CONSTITUTIONAL: No weakness, fevers or chills  EYES/ENT: No visual changes;  no throat pain   NECK: No pain or stiffness  RESPIRATORY: No cough, wheezing, hemoptysis; No shortness of breath  CARDIOVASCULAR: No chest pain or palpitations  GASTROINTESTINAL: No abdominal or epigastric pain. No nausea, vomiting,     No diarrhea or constipation. No melena   GENITOURINARY: No dysuria, frequency or hematuria  NEUROLOGICAL: No numbness or weakness  SKIN: dry      VITAL:  T(C): , Max: 37.3 (05-17-23 @ 08:55)  T(F): , Max: 99.1 (05-17-23 @ 08:55)  HR: 85 (05-17-23 @ 18:45)  BP: 154/68 (05-17-23 @ 18:45)  BP(mean): --  RR: 18 (05-17-23 @ 18:45)  SpO2: 99% (05-17-23 @ 18:45)  Wt(kg): --    I and O's:    Height (cm): 175.3 (05-17 @ 08:32)  Weight (kg): 59 (05-17 @ 08:32)  BMI (kg/m2): 19.2 (05-17 @ 08:32)  BSA (m2): 1.72 (05-17 @ 08:32)    PHYSICAL EXAM:    Constitutional: NAD  HEENT: conjunctive   clear   Neck:  No JVD  Respiratory: CTAB  Cardiovascular: S1 and S2  Gastrointestinal: BS+, soft, NT/ND  Extremities: No peripheral edema  Neurological: A/O x 3, no focal deficits  Psychiatric: Normal mood, normal affect  : No Renteria  Skin: No rashes  Access: Not applicable    LABS:                        10.5   12.18 )-----------( 354      ( 17 May 2023 09:40 )             33.3     05-17    x   |  x   |  x   ----------------------------<  157<H>  x    |  x   |  x     Ca    9.6      17 May 2023 09:40    TPro  7.0  /  Alb  3.1<L>  /  TBili  0.8  /  DBili  x   /  AST  10<L>  /  ALT  21  /  AlkPhos  119  05-17      Urine Studies:          RADIOLOGY & ADDITIONAL STUDIES:

## 2023-05-17 NOTE — H&P ADULT - NSHPLABSRESULTS_GEN_ALL_CORE
< from: CT Chest No Cont (05.17.23 @ 10:15) >    FINDINGS:    LUNGS AND AIRWAYS: PLEURA:  There is a small to moderate right-sided pleural effusion and associated   compressive atelectasis right lower lobe.  There is a moderate size left-sided pleural effusion with compressive   atelectasis/airspace consolidation left lower lobe.  There is patchy groundglass and airspace consolidationposterior left   upper lobe, extending along the fissural surface.    The central airways are patent.    MEDIASTINUM AND ZOHAIB:  Pretracheal lymph node measuring 1.1 cm short axis.    VESSELS:  Atherosclerotic changes thoracic aorta and coronary artery   calcification/stent.    HEART:  Cardiomegaly.   Trace pericardial effusion/thickening.    CHEST WALL AND LOWER NECK: Within normal limits.    VISUALIZED UPPER ABDOMEN: Within normal limits.    BONES: Degenerative changes.    IMPRESSION:    Small to moderate right-sided pleural effusion and associated compressive   atelectasis.    Moderate left-sided pleural effusion with atelectasis/airspace   consolidation left lower lobe.  Groundglass and airspace consolidation posterior segment left upper lobe,   could reflect edema or superimposed infection.    Other findings as discussed above.    < end of copied text >    < from: Xray Chest 1 View-PORTABLE IMMEDIATE (05.17.23 @ 10:12) >    FINDINGS: The heart size cannot be adequately assessed on this single   view. There is a new small to moderate left pleural effusion. There is   haziness of the left lung which may represent asymmetric pulmonary edema.   No focal consolidation is seen. The hilar and mediastinal structures   appear unremarkable.    IMPRESSION: New small to moderate left pleural effusion with evidence of   probable asymmetric pulmonary edema involving the left lung. No focal   consolidation is seen.    < end of copied text >    < from: TTE Echo Complete w/o Contrast w/ Doppler (04.08.22 @ 10:58) >    Left Ventricle: Left ventricle was of normal size, moderate LV systolic   dysfunction EF 45%  Aortic Valve: Aortic sclerosis  Mitral Valve: Moderate mitral regurgitation  Tricuspid Valve: Trace tricuspid regurgitation  Pulmonic Valve: Normal  Left Atrium: Normal  Right Ventricle: Normal  Right Atrium: Normal  Diastolic Function: Normal  Pericardium/Pleura: Normal      IMPRESSION:  Left ventricle was of normal size, moderate LV systolic dysfunction EF 45%  Aortic sclerosis  Moderate mitral regurgitation  Trace tricuspid regurgitation    < end of copied text >

## 2023-05-17 NOTE — ED ADULT NURSE NOTE - CHIEF COMPLAINT QUOTE
Patient BIBA from [facility] for CT of lungs. Per EMS, facility states that MD Larson is expecting the patients and wants the CT lungs to R/o PNA. Patient c/o cough. RUE limb alert bracelet placed for dialysis fistula. Patient doesn't remember last hemodialysis.

## 2023-05-17 NOTE — ED ADULT NURSE REASSESSMENT NOTE - PAIN RATING/NUMBER SCALE (0-10): ACTIVITY
Discussed with Dr Familia Contreras that heart rate was up to 120-130's after sitting up in chair for one hour, up with lane lift. 3 (mild pain)

## 2023-05-17 NOTE — PROVIDER CONTACT NOTE (CRITICAL VALUE NOTIFICATION) - NAME OF MD/NP/PA/DO NOTIFIED:
Xrays of spine and hips showed mild to moderate degenerative change.    I recommend initial trial of PT at [facility] for his Left hip and hip pain.   Leticia

## 2023-05-17 NOTE — ED ADULT NURSE NOTE - HISTORY OF COVID-19 VACCINATION
Return to the emergency department as needed for worsening of symptoms.  
developmental training/functional activities/parent/caregiver education & training/strengthening/vestibular stimulation/visual motor/perceptual training
Vaccine status unknown

## 2023-05-17 NOTE — ED ADULT NURSE REASSESSMENT NOTE - NSFALLHARMRISKINTERV_ED_ALL_ED

## 2023-05-17 NOTE — H&P ADULT - ASSESSMENT
73yo female bib ems with cough for 2 days. pt c/o dry cough, no fever, chills no sob, pt states she got dialysis yesterday, +smoking hx no other complaintsRVP positive for enterovirus /Chest xray at [facility] showed L lung opacification .In ER found to have rapid afib and seen by cardiologist Admitted  to telemetry unit for monitoring , send 3 sets of cardiac enzymes to rule out acute coronary event, obtain ECHO to evaluate LVEF, cardiology consult  ,continue current management, O2 supply, anticoagulation plan as per cardiology consult  chest CT that showed bilateral effusion more in left with compressive atelectasis vs consolidation. Admit to monitored unit for cardiac monitoring, obtain echo to evaluate LVEF, intravenous diuresis as per card consult , monitor ins/outs, monitor renal profile and electrolytes closely ,send 3 sets of enzymes, O2 supply, serial chest xrays, monitor weights and oral intake of fluids, nutritionist consult .Seen b y pulmonologist and ID consult Procalcitonin 0.9 WBC on admission 12k Tmax 99.1 This could be just viral infection causing cough but since Chest CT has questionable consolidations, will cover for CAP. Also procalcitonin is slightly high due to ESRD. Palliative care consult requested ,to discuss advance directives and complete MOLST

## 2023-05-17 NOTE — H&P ADULT - PROBLEM SELECTOR PROBLEM 3
Received report from Edison Nagel RN. Pt walked to bathroom with steady gait, denies blood in his urine, only with a BM. Pt denies pain/discomforts, WF admit orders. Viral syndrome

## 2023-05-17 NOTE — ED PROVIDER NOTE - OBJECTIVE STATEMENT
73yo female bib ems with cough for 2 days. pt c/o dry cough, no fever, chills no sob, pt states she got dialysis yesterday, +smoking hx no other complaints

## 2023-05-17 NOTE — ED ADULT NURSE NOTE - NSFALLRISKINTERV_ED_ALL_ED
Assistance with ambulation/Communicate fall risk and risk factors to all staff, patient, and family/Provide patient with walking aids/Provide visual cue: yellow wristband, yellow gown, etc/Reinforce activity limits and safety measures with patient and family/Call bell, personal items and telephone in reach/Instruct patient to call for assistance before getting out of bed/chair/stretcher/Non-slip footwear applied when patient is off stretcher/[place] to call system/Physically safe environment - no spills, clutter or unnecessary equipment/Purposeful Proactive Rounding/Room/bathroom lighting operational, light cord in reach

## 2023-05-17 NOTE — CONSULT NOTE ADULT - ASSESSMENT
Initial evaluation/Pulmonary Critical Care consultation requested on 5/17/2023  by Dr PRESTON     from Dr Landeros   Patient examined chart reviewed    HOSPITAL ADMISSION   PATIENT CAME  FROM (if information available)      BEST PRACTICE ISSUES.    HOB ELEVATN.   .. Yes  DVT PPLX.   ..    5/17/2023 HPSC   ELDER PPLX.   ..    5/17/2023 PROTONIX 40   INFN PPLX.   ..    SP SW AMINAH.    ..        DIET.    ..  5/17/2023 RENAL   IV fl.  ..     DRIPS.  .. CARD 5/17     ABGS.    VS/ PO/IO/ VENT/ DRIPS.   5/17/2023 126 150/80   5/17/2023 3l 99%       PROBLEM/ASSESSMENT/PLAN.  PULM.  . GAS EXCHANGE.  .. Target po 90-95%   . PLEURAL EFFUSION.   .. cxr cw 3/7/2023 new sm to mod l effsn   .. ct ch nc 5/17/2023 ct ch  .... Sm to mod r effs  .... mod l pl effs with atelectasis consoliatn l lo lobe   .... ground glass and airspace consolidn post segment chaz   .. 5/17/2023 Pl effsn may be sec to esrd chf or parapneumonia   .. 5/17/2023 Thoracentesis ordered     INFECTION.  . ENTERORHINOVIRUS INFECTION RESP TRACT .  . PNEUMONIA.    .. W 5/17/2023 w 12.1   .. pr 5/17/2023 pr .9   .. rvp 5/17/2023 enterorhinovirus   .. 5/17/2023 ROCEPHIN   .. Manage viral infection with supp care   .. Empiric antibio started by id to cover superimposed bact pneumonia cap or aspiration in setting of esrd    CARDIAC.  .. HYTN.  .. 5/17/2023 CLONIDINE .1 X 2   . CAD.  .. tr 5/17/2023 tr 56  .. 5/17/2023 ASA 81   .. 5/17/2023 ATORVASTAT 40   . A FIB RVR 5/17/2023   .. 5/17/2023 4P CARDIZEM DRIP 125  5/H  .. 5/17/2023 METOPROLOL 100.2   .. Pt is not on ac as freq falls   . CHF.  .. bnp bnp 175k     HEMAT.  .. Hb 5/17/2023 Hb 10.5  .. inr 5/17/2023 inr 125     GI.    RENAL.  .. Na 5/17/2023 na 133   .. cr 5/17/2023 cr 4.6     NEURO.  .. 5/17/2023 GABAPENTIN 100  .. 5/17/2023 IMIPRAMINE 50   .. 5/17/2023 RISPERIDAL .5       OVERALL.  . 72-year-old female former smoker with history of A-fib, diabetes, hypertension, hyperlipidemia, end-stage renal disease hemodialysis, CHF, CAD/CABG, PVD was sent from [facility] 5/17/2023   with cough for 2 days. pt c/o dry cough, no fever, chills no sob, pt states she got dialysis 5/16/2023 ,   .. Pt was recently admitted 3/7-3/11/2023 and before that 2/22-2/25/2023   .. 5/17/2023 No antibio use last 3 m   .. Pt admitted 5/17/2023 Pulm consulted     PROBLEMS .  . ENTERORHINOVIRUS INFECTION RESP TRACT    . PLEURAL EFFUSION   .. Thoracentesis requested   . PNEUMONIA 5/17/2023  .... 5/17/2023 JOSE ALEJANDRO CHEEK   . CAD.  . HYTN.   . CHF.  . ESRD.      PMH.   CAD.  .. HISTORY ho cabg  A fib  .. 3/7/2023 Not on ac sec multiple falls  PAD  .. sp R-> L bifem - fem BK pop bypass   .. Fem pop bypass 6/21/2022   .. Partial ray amputation r great toe 6/22/2022   ESRD   .. On HD     CURRENT ISSUES.  .. RO PNEUMONIA Rocephin startd 5/17/2023   .. LARGE L EFFSN 5/17/2023 Thorac ordered  .. AFRVR 5/17/2023 Cardizem drip startd     .    TIME SPENT   Over 55 minutes aggregate care time spent on encounter; activities included   direct patient care, counseling and/or coordinating care reviewing notes, lab data/ imaging , discussion with multidisciplinary team/ patient  /family and explaining in detail risks, benefits, alternatives  of the recommendations     JOSE F LAO 72 f 5/17/2023 1948 DR HARRY ALBRIGHT

## 2023-05-17 NOTE — CONSULT NOTE ADULT - SUBJECTIVE AND OBJECTIVE BOX
CHIEF COMPLAINT/ REASON FOR VISIT  .. Patient was seen to address the  issue listed under PROBLEM LIST which is located toward bottom of this note     SHILO KOHLER    PLV APER 23    Allergies    No Known Drug Allergies  latex (Hives)    Intolerances        PAST MEDICAL & SURGICAL HISTORY:  HTN (hypertension)      h/o Anxiety attack      Depression      h/o Myocardial infarct 2007      h/o Hepatitis A 1969  currently resolved, no symptoms      Murmur, cardiac      h/o Smoking  quitted 3/2012      Anemia secondary to renal failure      ESRD on dialysis      Falls      Ataxia      Type 2 diabetes mellitus      Peripheral vascular disease, unspecified      CAD (coronary artery disease)      Diabetes      coronary stent 2007      s/p Ovarian cyst removal      s/p surgical removal of benign Skin lesion epigastric area      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          FAMILY HISTORY:  FH: myocardial infarction (Father)    FH: myocardial infarction (Father)    Family history of type 2 diabetes mellitus in brother (Sibling)        Home Medications:  acetaminophen 325 mg oral tablet: 2 tab(s) orally every 6 hours, As needed, Temp greater or equal to 38C (100.4F), Mild Pain (1 - 3) (17 May 2023 14:45)  Admelog SoloStar 100 units/mL injectable solution: injectable 3 times a day (before meals)sliding scale  (17 May 2023 14:45)  aspirin 81 mg oral delayed release tablet: 1 tab(s) orally once a day (at bedtime) (17 May 2023 14:45)  atorvastatin 40 mg oral tablet: 1 tab(s) orally once a day (at bedtime) (17 May 2023 14:45)  Basaglar KwikPen 100 units/mL subcutaneous solution: 18 unit(s) subcutaneous once a day (at bedtime) (17 May 2023 14:42)  cloNIDine 0.1 mg oral tablet: 1 tab(s) orally 2 times a day (17 May 2023 14:45)  gabapentin 100 mg oral capsule: 1 cap(s) orally once a day (at bedtime) (17 May 2023 14:41)  imipramine 50 mg oral tablet: 1 tab(s) orally once a day (17 May 2023 14:45)  Lokelma 10 g oral powder for reconstitution: 10 gram(s) orally 2 times a week on Wed and Sat  (17 May 2023 14:45)  metoprolol tartrate 100 mg oral tablet: 1 tab(s) orally once a day (at bedtime) (17 May 2023 14:45)  Mucinex 600 mg oral tablet, extended release: 1 tab(s) orally 2 times a day (17 May 2023 14:43)  Nephro-Alfie oral tablet: 1 tab(s) orally once a day (17 May 2023 14:45)  PANTOPRAZOLE 40MG DR TAB: 1 tab(s) orally once a day (17 May 2023 14:45)      MEDICATIONS  (STANDING):  aspirin enteric coated 81 milliGRAM(s) Oral daily  atorvastatin 40 milliGRAM(s) Oral at bedtime  cloNIDine 0.1 milliGRAM(s) Oral two times a day  dextrose 5%. 1000 milliLiter(s) (50 mL/Hr) IV Continuous <Continuous>  dextrose 5%. 1000 milliLiter(s) (100 mL/Hr) IV Continuous <Continuous>  dextrose 50% Injectable 12.5 Gram(s) IV Push once  dextrose 50% Injectable 25 Gram(s) IV Push once  dextrose 50% Injectable 25 Gram(s) IV Push once  diltiazem    Tablet 30 milliGRAM(s) Oral three times a day  glucagon  Injectable 1 milliGRAM(s) IntraMuscular once  heparin   Injectable 5000 Unit(s) SubCutaneous every 12 hours  insulin lispro (ADMELOG) corrective regimen sliding scale   SubCutaneous three times a day before meals  melatonin 3 milliGRAM(s) Oral at bedtime  metoprolol tartrate 100 milliGRAM(s) Oral two times a day  multivitamin 1 Tablet(s) Oral daily  pantoprazole    Tablet 40 milliGRAM(s) Oral before breakfast  risperiDONE   Tablet 0.5 milliGRAM(s) Oral at bedtime  senna 2 Tablet(s) Oral at bedtime    MEDICATIONS  (PRN):  dextrose Oral Gel 15 Gram(s) Oral once PRN Blood Glucose LESS THAN 70 milliGRAM(s)/deciliter      Diet, Renal Restrictions:   For patients receiving Renal Replacement - No Protein Restr, No Conc K, No Conc Phos, Low Sodium (05-17-23 @ 13:46) [Active]          Vital Signs Last 24 Hrs  T(C): 37.3 (17 May 2023 08:55), Max: 37.3 (17 May 2023 08:55)  T(F): 99.1 (17 May 2023 08:55), Max: 99.1 (17 May 2023 08:55)  HR: 99 (17 May 2023 11:31) (99 - 134)  BP: 148/67 (17 May 2023 11:31) (148/67 - 156/74)  BP(mean): --  RR: 18 (17 May 2023 11:31) (16 - 20)  SpO2: 98% (17 May 2023 11:31) (91% - 99%)    Parameters below as of 17 May 2023 11:31  Patient On (Oxygen Delivery Method): nasal cannula  O2 Flow (L/min): 2                LABS:                        10.5   12.18 )-----------( 354      ( 17 May 2023 09:40 )             33.3     05-17    x   |  x   |  x   ----------------------------<  157<H>  x    |  x   |  x     Ca    9.6      17 May 2023 09:40    TPro  7.0  /  Alb  3.1<L>  /  TBili  0.8  /  DBili  x   /  AST  10<L>  /  ALT  21  /  AlkPhos  119  05-17    PT/INR - ( 17 May 2023 09:40 )   PT: 14.6 sec;   INR: 1.25 ratio         PTT - ( 17 May 2023 09:40 )  PTT:27.9 sec          WBC:  WBC Count: 12.18 K/uL (05-17 @ 09:40)      MICROBIOLOGY:  RECENT CULTURES:              PT/INR - ( 17 May 2023 09:40 )   PT: 14.6 sec;   INR: 1.25 ratio         PTT - ( 17 May 2023 09:40 )  PTT:27.9 sec    Sodium:  Sodium, Serum: 133 mmol/L (05-17 @ 09:40)      4.60 mg/dL 05-17 @ 09:40      Hemoglobin:  Hemoglobin: 10.5 g/dL (05-17 @ 09:40)      Platelets: Platelet Count - Automated: 354 K/uL (05-17 @ 09:40)      LIVER FUNCTIONS - ( 17 May 2023 09:40 )  Alb: 3.1 g/dL / Pro: 7.0 g/dL / ALK PHOS: 119 U/L / ALT: 21 U/L / AST: 10 U/L / GGT: x                 RADIOLOGY & ADDITIONAL STUDIES:      MICROBIOLOGY:  RECENT CULTURES:

## 2023-05-17 NOTE — CONSULT NOTE ADULT - ASSESSMENT
cugj - pn - had zosyn and vancomycin  ashd - atrial; fib - rvr - had cardizem  ashd- s/p mi  s/p cabg  pvd- s/p bypass  left pl effusion- thoracentasis ?  dm2  esrd - on hd

## 2023-05-17 NOTE — ED ADULT NURSE REASSESSMENT NOTE - NS ED NURSE REASSESS COMMENT FT1
pt 's a-fib. called/spoke to Dr Larson, cardio to be consulted and cardizem IVP orders pending. pt respirations even/unlabored, pt denies pain or any new/worsening sx. will continue to monitor.

## 2023-05-17 NOTE — ED ADULT NURSE NOTE - NSFALLLASTSIX_ED_ALL_ED
Gomez Lombardo was seen and treated in our emergency department on 11/30/2022  Diagnosis:     Wenceslao Torres  may return to school on return date  He may return on this date: 12/05/2022         If you have any questions or concerns, please don't hesitate to call        Shahana Corrales DO    ______________________________           _______________          _______________  Hospital Representative                              Date                                Time Unable to determine.

## 2023-05-17 NOTE — CONSULT NOTE ADULT - SUBJECTIVE AND OBJECTIVE BOX
Patient is a 72y Female with a known history of : admitted with cough for 3 days- no sob , no chest pain    HPI:      REVIEW OF SYSTEMS:    CONSTITUTIONAL: No fever, weight loss, or fatigue  EYES: No eye pain, visual disturbances, or discharge  ENMT:  No difficulty hearing, tinnitus, vertigo; No sinus or throat pain  NECK: No pain or stiffness  BREASTS: No pain, masses, or nipple discharge  RESPIRATORY: No cough, wheezing, chills or hemoptysis; No shortness of breath  CARDIOVASCULAR: No chest pain, palpitations, dizziness, or leg swelling  GASTROINTESTINAL: No abdominal or epigastric pain. No nausea, vomiting, or hematemesis; No diarrhea or constipation. No melena or hematochezia.  GENITOURINARY: No dysuria, frequency, hematuria, or incontinence  NEUROLOGICAL: No headaches, memory loss, loss of strength, numbness, or tremors  SKIN: No itching, burning, rashes, or lesions   LYMPH NODES: No enlarged glands  ENDOCRINE: No heat or cold intolerance; No hair loss  MUSCULOSKELETAL: No joint pain or swelling; No muscle, back, or extremity pain  PSYCHIATRIC: No depression, anxiety, mood swings, or difficulty sleeping  HEME/LYMPH: No easy bruising, or bleeding gums  ALLERGY AND IMMUNOLOGIC: No hives or eczema    MEDICATIONS  (STANDING):  aspirin enteric coated 81 milliGRAM(s) Oral daily  atorvastatin 40 milliGRAM(s) Oral at bedtime  cloNIDine 0.1 milliGRAM(s) Oral two times a day  dextrose 5%. 1000 milliLiter(s) (50 mL/Hr) IV Continuous <Continuous>  dextrose 5%. 1000 milliLiter(s) (100 mL/Hr) IV Continuous <Continuous>  dextrose 50% Injectable 25 Gram(s) IV Push once  dextrose 50% Injectable 12.5 Gram(s) IV Push once  dextrose 50% Injectable 25 Gram(s) IV Push once  glucagon  Injectable 1 milliGRAM(s) IntraMuscular once  heparin   Injectable 5000 Unit(s) SubCutaneous every 12 hours  insulin lispro (ADMELOG) corrective regimen sliding scale   SubCutaneous three times a day before meals  melatonin 3 milliGRAM(s) Oral at bedtime  metoprolol tartrate 50 milliGRAM(s) Oral two times a day  multivitamin 1 Tablet(s) Oral daily  pantoprazole    Tablet 40 milliGRAM(s) Oral before breakfast  risperiDONE   Tablet 0.5 milliGRAM(s) Oral at bedtime  senna 2 Tablet(s) Oral at bedtime    MEDICATIONS  (PRN):  dextrose Oral Gel 15 Gram(s) Oral once PRN Blood Glucose LESS THAN 70 milliGRAM(s)/deciliter      ALLERGIES: No Known Drug Allergies  latex (Hives)      FAMILY HISTORY:  FH: myocardial infarction (Father)    FH: myocardial infarction (Father)    Family history of type 2 diabetes mellitus in brother (Sibling)        PHYSICAL EXAMINATION:  -----------------------------  T(C): 37.3 (05-17-23 @ 08:55), Max: 37.3 (05-17-23 @ 08:55)  HR: 99 (05-17-23 @ 11:31) (99 - 134)  BP: 148/67 (05-17-23 @ 11:31) (148/67 - 156/74)  RR: 18 (05-17-23 @ 11:31) (16 - 20)  SpO2: 98% (05-17-23 @ 11:31) (91% - 99%)  Wt(kg): --    Height (cm): 175.3 (05-17 @ 08:32)  Weight (kg): 59 (05-17 @ 08:32)  BMI (kg/m2): 19.2 (05-17 @ 08:32)  BSA (m2): 1.72 (05-17 @ 08:32)    VITALS  T(C): 37.3 (05-17-23 @ 08:55), Max: 37.3 (05-17-23 @ 08:55)  HR: 99 (05-17-23 @ 11:31) (99 - 134)  BP: 148/67 (05-17-23 @ 11:31) (148/67 - 156/74)  RR: 18 (05-17-23 @ 11:31) (16 - 20)  SpO2: 98% (05-17-23 @ 11:31) (91% - 99%)    Constitutional: well developed, normal appearance, well groomed, well nourished, no deformities and no acute distress.   Eyes: the conjunctiva exhibited no abnormalities and the eyelids demonstrated no xanthelasmas.   HEENT: normal oral mucosa, no oral pallor and no oral cyanosis.   Neck: normal jugular venous A waves present, normal jugular venous V waves present and no jugular venous wilson A waves.   Pulmonary: no respiratory distress, normal respiratory rhythm and effort, no accessory muscle use and lungs were clear to auscultation bilaterally.   Cardiovascular: heart rate and rhythm were normal, normal S1 and S2 and no murmur, gallop, rub, heave or thrill are present.   Abdomen: soft, non-tender, no hepato-splenomegaly and no abdominal mass palpated.   Musculoskeletal: the gait could not be assessed..   Extremities: no clubbing of the fingernails, no localized cyanosis, no petechial hemorrhages and no ischemic changes.   Skin: normal skin color and pigmentation, no rash, no venous stasis, no skin lesions, no skin ulcer and no xanthoma was observed.   Psychiatric: oriented to person, place, and time, the affect was normal, the mood was normal and not feeling anxious.     LABS:   --------  05-17    133<L>  |  99  |  28<H>  ----------------------------<  118<H>  3.9   |  31  |  4.60<H>    Ca    9.6      17 May 2023 09:40    TPro  7.0  /  Alb  3.1<L>  /  TBili  0.8  /  DBili  x   /  AST  10<L>  /  ALT  21  /  AlkPhos  119  05-17                         10.5   12.18 )-----------( 354      ( 17 May 2023 09:40 )             33.3     PT/INR - ( 17 May 2023 09:40 )   PT: 14.6 sec;   INR: 1.25 ratio         PTT - ( 17 May 2023 09:40 )  PTT:27.9 sec            RADIOLOGY:  -----------------    ECG:     ECHO:

## 2023-05-17 NOTE — H&P ADULT - HISTORY OF PRESENT ILLNESS
71yo female bib ems with cough for 2 days. pt c/o dry cough, no fever, chills no sob, pt states she got dialysis yesterday, +smoking hx no other complaintsRVP positive for enterovirus /Chest xray at [facility] showed L lung opacification .In ER found to have rapid afib and seen by cardiologist Admitted  to telemetry unit for monitoring , send 3 sets of cardiac enzymes to rule out acute coronary event, obtain ECHO to evaluate LVEF, cardiology consult  ,continue current management, O2 supply, anticoagulation plan as per cardiology consult  chest CT that showed bilateral effusion more in left with compressive atelectasis vs consolidation. Admit to monitored unit for cardiac monitoring, obtain echo to evaluate LVEF, intravenous diuresis as per card consult , monitor ins/outs, monitor renal profile and electrolytes closely ,send 3 sets of enzymes, O2 supply, serial chest xrays, monitor weights and oral intake of fluids, nutritionist consult .Seen b y pulmonologist and ID consult Procalcitonin 0.9 WBC on admission 12k Tmax 99.1 This could be just viral infection causing cough but since Chest CT has questionable consolidations, will cover for CAP. Also procalcitonin is slightly high due to ESRD. Palliative care consult requested ,to discuss advance directives and complete MOLST

## 2023-05-17 NOTE — CONSULT NOTE ADULT - ASSESSMENT
esrd on hd   Excess fluids and waste products will be removed from your blood; your electrolytes will be balanced; your blood pressure will be controlled.      ANEMIA PLAN:  Anemia of chronic disease:  Well controlled by Aranesp  H and H subtherapeutic .  We will continue Aranesp aiming for a HCT of 32-36 %.   We will monitor Iron stores, B12 and RBC folate .      BP monitoring,continue current antihypertensive meds, low salt diet,followup with PMD in 1-2 weeks   71yo female bib ems with cough for 2 days. pt c/o dry cough, no fever, chills no sob, pt states she got dialysis yesterday, +smoking hx no other complaintsRVP positive for enterovirus /Chest xray at [facility] showed L lung opacification .In ER found to have rapid afib and seen by cardiologist Admitted  to telemetry unit for monitoring , send 3 sets of cardiac enzymes to rule out acute coronary event, obtain ECHO to evaluate LVEF, cardiology consult  ,continue current management, O2 supply, anticoagulation plan as per cardiology consult  chest CT that showed bilateral effusion more in left with compressive atelectasis vs consolidation. Admit to monitored unit for cardiac monitoring, obtain echo to evaluate LVEF, intravenous diuresis as per card consult , monitor ins/outs, monitor renal profile and electrolytes closely ,send 3 sets of enzymes, O2 supply, serial chest xrays, monitor weights and oral intake of fluids, nutritionist consult .Seen b y pulmonologist and ID consult Procalcitonin 0.9 WBC on admission 12k Tmax 99.1 This could be just viral infection causing cough but since Chest CT has questionable consolidations, will cover for CAP. Also procalcitonin is slightly high due to ESRD. Palliative care consult requested ,to discuss advance directives and complete MOLST  (17 May 2023 14:01)        esrd on hd   Excess fluids and waste products will be removed from your blood; your electrolytes will be balanced; your blood pressure will be controlled.      ANEMIA PLAN:  Anemia of chronic disease:  Well controlled by Aranesp  H and H subtherapeutic .  We will continue Aranesp aiming for a HCT of 32-36 %.   We will monitor Iron stores, B12 and RBC folate .      BP monitoring,continue current antihypertensive meds, low salt diet,followup with PMD in 1-2 weeks

## 2023-05-17 NOTE — ED PROVIDER NOTE - CONSTITUTIONAL, MLM
What Type Of Note Output Would You Prefer (Optional)?: Bullet Format How Severe Is Your Rash?: mild Is This A New Presentation, Or A Follow-Up?: Rash normal... Well appearing, awake, alert, oriented to person, place, time/situation and in no apparent distress.

## 2023-05-17 NOTE — ED ADULT NURSE REASSESSMENT NOTE - NS ED NURSE REASSESS COMMENT FT1
Received Pt a/ox4 resting comfortably in bed, no signs of acute distress noted.  Awaiting radiology results.

## 2023-05-17 NOTE — ED ADULT NURSE NOTE - OBJECTIVE STATEMENT
72y F BIBEMS from [facility] for cough/fever x2days. pt alert, oriented to self, unaware of situation/location, poor historian. 99.1 rectal temp. placed on CM, A-fib HR in 130's. 72y F BIBEMS from [facility] for cough/fever x2days. pt alert, oriented to self, unaware of situation/location, poor historian. 99.1 rectal temp. placed on CM, EKG performed at bedside, A-fib HR in 130's. 91% O2 on RA upon arrival, placed on 3L NC, 98% O2 sat now, respirations even, not visibly labored. wet cough noted. #20 IV placed left FA, labs drawn, medicated per orders.

## 2023-05-17 NOTE — H&P ADULT - TIME BILLING
75minutes spent on this visit, 50% visit time spent in care co-ordination with other attendings and counselling patient ,writing admission orders ( see complete and current orders and order section) ,requesting necessary consults ,informing family about status & plan of care .I have discussed care plan with [facility] wellness/admitting /nursing   department ,outpatient PCP , hospital consultants , ER physician & med staff .

## 2023-05-18 DIAGNOSIS — J90 PLEURAL EFFUSION, NOT ELSEWHERE CLASSIFIED: ICD-10-CM

## 2023-05-18 LAB
A1C WITH ESTIMATED AVERAGE GLUCOSE RESULT: 7.1 % — HIGH (ref 4–5.6)
ALBUMIN FLD-MCNC: 1.9 G/DL — SIGNIFICANT CHANGE UP
ALBUMIN SERPL ELPH-MCNC: 2.7 G/DL — LOW (ref 3.3–5)
ALP SERPL-CCNC: 104 U/L — SIGNIFICANT CHANGE UP (ref 40–120)
ALT FLD-CCNC: 17 U/L — SIGNIFICANT CHANGE UP (ref 12–78)
ANION GAP SERPL CALC-SCNC: 8 MMOL/L — SIGNIFICANT CHANGE UP (ref 5–17)
AST SERPL-CCNC: 21 U/L — SIGNIFICANT CHANGE UP (ref 15–37)
B PERT IGG+IGM PNL SER: ABNORMAL
BASE EXCESS BLDA CALC-SCNC: 2.2 MMOL/L — SIGNIFICANT CHANGE UP (ref -2–3)
BASOPHILS # BLD AUTO: 0.13 K/UL — SIGNIFICANT CHANGE UP (ref 0–0.2)
BASOPHILS NFR BLD AUTO: 0.8 % — SIGNIFICANT CHANGE UP (ref 0–2)
BILIRUB SERPL-MCNC: 0.7 MG/DL — SIGNIFICANT CHANGE UP (ref 0.2–1.2)
BLOOD GAS COMMENTS ARTERIAL: SIGNIFICANT CHANGE UP
BUN SERPL-MCNC: 39 MG/DL — HIGH (ref 7–23)
CALCIUM SERPL-MCNC: 9.1 MG/DL — SIGNIFICANT CHANGE UP (ref 8.5–10.1)
CHLORIDE SERPL-SCNC: 100 MMOL/L — SIGNIFICANT CHANGE UP (ref 96–108)
CO2 SERPL-SCNC: 26 MMOL/L — SIGNIFICANT CHANGE UP (ref 22–31)
COLOR FLD: SIGNIFICANT CHANGE UP
CREAT SERPL-MCNC: 5.4 MG/DL — HIGH (ref 0.5–1.3)
EGFR: 8 ML/MIN/1.73M2 — LOW
EOSINOPHIL # BLD AUTO: 0.42 K/UL — SIGNIFICANT CHANGE UP (ref 0–0.5)
EOSINOPHIL # FLD: 1 % — SIGNIFICANT CHANGE UP
EOSINOPHIL NFR BLD AUTO: 2.7 % — SIGNIFICANT CHANGE UP (ref 0–6)
ESTIMATED AVERAGE GLUCOSE: 157 MG/DL — HIGH (ref 68–114)
FLUID INTAKE SUBSTANCE CLASS: SIGNIFICANT CHANGE UP
GAS PNL BLDA: SIGNIFICANT CHANGE UP
GLUCOSE FLD-MCNC: 131 MG/DL — SIGNIFICANT CHANGE UP
GLUCOSE SERPL-MCNC: 114 MG/DL — HIGH (ref 70–99)
GRAM STN FLD: SIGNIFICANT CHANGE UP
HCO3 BLDA-SCNC: 27 MMOL/L — SIGNIFICANT CHANGE UP (ref 21–28)
HCT VFR BLD CALC: 33.7 % — LOW (ref 34.5–45)
HGB BLD-MCNC: 10.6 G/DL — LOW (ref 11.5–15.5)
IMM GRANULOCYTES NFR BLD AUTO: 0.8 % — SIGNIFICANT CHANGE UP (ref 0–0.9)
INR BLD: 1.33 RATIO — HIGH (ref 0.88–1.16)
LDH SERPL L TO P-CCNC: 102 U/L — SIGNIFICANT CHANGE UP
LYMPHOCYTES # BLD AUTO: 0.65 K/UL — LOW (ref 1–3.3)
LYMPHOCYTES # BLD AUTO: 4.2 % — LOW (ref 13–44)
LYMPHOCYTES # FLD: 13 % — SIGNIFICANT CHANGE UP
MAGNESIUM SERPL-MCNC: 2.2 MG/DL — SIGNIFICANT CHANGE UP (ref 1.6–2.6)
MCHC RBC-ENTMCNC: 31.2 PG — SIGNIFICANT CHANGE UP (ref 27–34)
MCHC RBC-ENTMCNC: 31.5 GM/DL — LOW (ref 32–36)
MCV RBC AUTO: 99.1 FL — SIGNIFICANT CHANGE UP (ref 80–100)
MESOTHL CELL # FLD: 8 % — SIGNIFICANT CHANGE UP
MONOCYTES # BLD AUTO: 1.49 K/UL — HIGH (ref 0–0.9)
MONOCYTES NFR BLD AUTO: 9.6 % — SIGNIFICANT CHANGE UP (ref 2–14)
MONOS+MACROS # FLD: 73 % — SIGNIFICANT CHANGE UP
NEUTROPHILS # BLD AUTO: 12.69 K/UL — HIGH (ref 1.8–7.4)
NEUTROPHILS NFR BLD AUTO: 81.9 % — HIGH (ref 43–77)
NEUTROPHILS-BODY FLUID: 5 % — SIGNIFICANT CHANGE UP
NRBC # BLD: 0 /100 WBCS — SIGNIFICANT CHANGE UP (ref 0–0)
PCO2 BLDA: 45 MMHG — HIGH (ref 32–35)
PH BLDA: 7.39 — SIGNIFICANT CHANGE UP (ref 7.35–7.45)
PH FLD: 7.6 — SIGNIFICANT CHANGE UP
PHOSPHATE SERPL-MCNC: 4.8 MG/DL — HIGH (ref 2.5–4.5)
PLATELET # BLD AUTO: 296 K/UL — SIGNIFICANT CHANGE UP (ref 150–400)
PO2 BLDA: 65 MMHG — LOW (ref 83–108)
POTASSIUM SERPL-MCNC: 5 MMOL/L — SIGNIFICANT CHANGE UP (ref 3.5–5.3)
POTASSIUM SERPL-SCNC: 5 MMOL/L — SIGNIFICANT CHANGE UP (ref 3.5–5.3)
PROCALCITONIN SERPL-MCNC: 0.86 NG/ML — HIGH
PROT FLD-MCNC: 2.6 G/DL — SIGNIFICANT CHANGE UP
PROT SERPL-MCNC: 6.4 G/DL — SIGNIFICANT CHANGE UP (ref 6–8.3)
PROTHROM AB SERPL-ACNC: 15.6 SEC — HIGH (ref 10.5–13.4)
RBC # BLD: 3.4 M/UL — LOW (ref 3.8–5.2)
RBC # FLD: 15.5 % — HIGH (ref 10.3–14.5)
RCV VOL RI: HIGH /UL (ref 0–0)
SAO2 % BLDA: 94 % — SIGNIFICANT CHANGE UP (ref 94–98)
SODIUM SERPL-SCNC: 134 MMOL/L — LOW (ref 135–145)
SPECIMEN SOURCE: SIGNIFICANT CHANGE UP
TOTAL NUCLEATED CELL COUNT, BODY FLUID: 362 /UL — SIGNIFICANT CHANGE UP
TRIGL FLD-MCNC: 20 MG/DL — SIGNIFICANT CHANGE UP
TSH SERPL-MCNC: 0.96 UIU/ML — SIGNIFICANT CHANGE UP (ref 0.36–3.74)
TUBE TYPE: SIGNIFICANT CHANGE UP
WBC # BLD: 15.5 K/UL — HIGH (ref 3.8–10.5)
WBC # FLD AUTO: 15.5 K/UL — HIGH (ref 3.8–10.5)

## 2023-05-18 PROCEDURE — 88108 CYTOPATH CONCENTRATE TECH: CPT | Mod: 26

## 2023-05-18 PROCEDURE — 99233 SBSQ HOSP IP/OBS HIGH 50: CPT

## 2023-05-18 PROCEDURE — 88305 TISSUE EXAM BY PATHOLOGIST: CPT | Mod: 26

## 2023-05-18 PROCEDURE — 32555 ASPIRATE PLEURA W/ IMAGING: CPT | Mod: LT

## 2023-05-18 PROCEDURE — 71045 X-RAY EXAM CHEST 1 VIEW: CPT | Mod: 26

## 2023-05-18 RX ORDER — DRONABINOL 2.5 MG
2.5 CAPSULE ORAL
Refills: 0 | Status: DISCONTINUED | OUTPATIENT
Start: 2023-05-18 | End: 2023-05-24

## 2023-05-18 RX ORDER — MIRTAZAPINE 45 MG/1
7.5 TABLET, ORALLY DISINTEGRATING ORAL AT BEDTIME
Refills: 0 | Status: DISCONTINUED | OUTPATIENT
Start: 2023-05-18 | End: 2023-05-24

## 2023-05-18 RX ADMIN — PANTOPRAZOLE SODIUM 40 MILLIGRAM(S): 20 TABLET, DELAYED RELEASE ORAL at 05:16

## 2023-05-18 RX ADMIN — Medication 100 MILLIGRAM(S): at 19:07

## 2023-05-18 RX ADMIN — HEPARIN SODIUM 5000 UNIT(S): 5000 INJECTION INTRAVENOUS; SUBCUTANEOUS at 05:16

## 2023-05-18 RX ADMIN — Medication 0.1 MILLIGRAM(S): at 19:07

## 2023-05-18 RX ADMIN — SENNA PLUS 2 TABLET(S): 8.6 TABLET ORAL at 21:33

## 2023-05-18 RX ADMIN — ATORVASTATIN CALCIUM 40 MILLIGRAM(S): 80 TABLET, FILM COATED ORAL at 21:33

## 2023-05-18 RX ADMIN — CEFTRIAXONE 100 MILLIGRAM(S): 500 INJECTION, POWDER, FOR SOLUTION INTRAMUSCULAR; INTRAVENOUS at 21:38

## 2023-05-18 RX ADMIN — Medication 5 MG/HR: at 19:59

## 2023-05-18 RX ADMIN — Medication 100 MILLIGRAM(S): at 05:17

## 2023-05-18 RX ADMIN — RISPERIDONE 0.5 MILLIGRAM(S): 4 TABLET ORAL at 21:32

## 2023-05-18 RX ADMIN — GABAPENTIN 100 MILLIGRAM(S): 400 CAPSULE ORAL at 21:38

## 2023-05-18 RX ADMIN — HEPARIN SODIUM 5000 UNIT(S): 5000 INJECTION INTRAVENOUS; SUBCUTANEOUS at 19:07

## 2023-05-18 RX ADMIN — MIRTAZAPINE 7.5 MILLIGRAM(S): 45 TABLET, ORALLY DISINTEGRATING ORAL at 22:12

## 2023-05-18 RX ADMIN — Medication 81 MILLIGRAM(S): at 11:29

## 2023-05-18 RX ADMIN — Medication 0.1 MILLIGRAM(S): at 05:16

## 2023-05-18 RX ADMIN — Medication 50 MILLIGRAM(S): at 11:28

## 2023-05-18 RX ADMIN — Medication 1 TABLET(S): at 11:29

## 2023-05-18 RX ADMIN — Medication 3 MILLIGRAM(S): at 21:34

## 2023-05-18 NOTE — CARE COORDINATION ASSESSMENT. - OTHER PERTINENT REFERRAL INFORMATION
TORIN spoke with RN at [facility] as pt. is a long term resident there, to complete assessment, introduce self and explain role with good understanding. Pt is from [facility] where she resides with her spouse, as per RN pt. ambulates with a walker and is currently confused but alert at baseline when she is feeling well. Pt. reportedly uses oxygen PRN and receives dialysis Monday, Thursday and Friday onsite. Pt. to return to SNF upon d/c. TORIN left a voicemail for pt. son Jeronimo as well. SW to follow.

## 2023-05-18 NOTE — PROGRESS NOTE ADULT - ASSESSMENT
esrd on hd   Excess fluids and waste products will be removed from your blood; your electrolytes will be balanced; your blood pressure will be controlled.      ANEMIA PLAN:  Anemia of chronic disease:  Well controlled by Aranesp  H and H subtherapeutic .  We will continue Aranesp aiming for a HCT of 32-36 %.   We will monitor Iron stores, B12 and RBC folate .      BP monitoring,continue current antihypertensive meds, low salt diet,followup with PMD in 1-2 weeks     HPI:  71yo female bib ems with cough for 2 days. pt c/o dry cough, no fever, chills no sob, pt states she got dialysis yesterday, +smoking hx no other complaintsRVP positive for enterovirus /Chest xray at [facility] showed L lung opacification .In ER found to have rapid afib and seen by cardiologist Admitted  to telemetry unit for monitoring , send 3 sets of cardiac enzymes to rule out acute coronary event, obtain ECHO to evaluate LVEF, cardiology consult  ,continue current management, O2 supply, anticoagulation plan as per cardiology consult  chest CT that showed bilateral effusion more in left with compressive atelectasis vs consolidation. Admit to monitored unit for cardiac monitoring, obtain echo to evaluate LVEF, intravenous diuresis as per card consult , monitor ins/outs, monitor renal profile and electrolytes closely ,send 3 sets of enzymes, O2 supply, serial chest xrays, monitor weights and oral intake of fluids, nutritionist consult .Seen b y pulmonologist and ID consult Procalcitonin 0.9 WBC on admission 12k Tmax 99.1 This could be just viral infection causing cough but since Chest CT has questionable consolidations, will cover for CAP. Also procalcitonin is slightly high due to ESRD. Palliative care consult requested ,to discuss advance directives and complete MOLST  (17 May 2023 14:01)      esrd on hd   Excess fluids and waste products will be removed from your blood; your electrolytes will be balanced; your blood pressure will be controlled.      ANEMIA PLAN:  Anemia of chronic disease:  Well controlled by Aranesp  H and H subtherapeutic .  We will continue Aranesp aiming for a HCT of 32-36 %.   We will monitor Iron stores, B12 and RBC folate .      BP monitoring,continue current antihypertensive meds, low salt diet,followup with PMD in 1-2 weeks

## 2023-05-18 NOTE — PROGRESS NOTE ADULT - ASSESSMENT
cugj - pn - had zosyn and vancomycin  ashd - atrial; fib - rvr - had cardizem  ashd- s/p mi  s/p cabg  pvd- s/p bypass  left pl effusion- thoracentasis ?  dm2  esrd - on hd  atrial fib - on cardizem drip- rate controlled - may need oac - to discuss with family- to continue iv cardizem 5/18

## 2023-05-18 NOTE — DIETITIAN INITIAL EVALUATION ADULT - PHYSCIAL ASSESSMENT
other (specify) BMI 19  Pt sleeping soundly during visit; unable to complete full NFPE  Observable signs of muscle depletion/fat loss noted below

## 2023-05-18 NOTE — DIETITIAN INITIAL EVALUATION ADULT - PERTINENT LABORATORY DATA
05-18    134<L>  |  100  |  39<H>  ----------------------------<  114<H>  5.0   |  26  |  5.40<H>    Ca    9.1      18 May 2023 06:30  Phos  4.8     05-18  Mg     2.2     05-18    TPro  6.4  /  Alb  2.7<L>  /  TBili  0.7  /  DBili  x   /  AST  21  /  ALT  17  /  AlkPhos  104  05-18  POCT Blood Glucose.: 118 mg/dL (05-18-23 @ 07:52)  A1C with Estimated Average Glucose Result: 6.8 % (01-31-23 @ 13:48)  A1C with Estimated Average Glucose Result: 9.3 % (09-13-22 @ 07:28)  A1C with Estimated Average Glucose Result: 9.0 % (06-17-22 @ 10:44)

## 2023-05-18 NOTE — CARE COORDINATION ASSESSMENT. - NSCAREPROVIDERS_GEN_ALL_CORE_FT
CARE PROVIDERS:  Accepting Physician: Diamond Larson  Administration: Asael Hernandez  Admitting: Diamond Larson  Attending: Diamond Larson  Case Management: Dena Chowdary  Consultant: Geovany Long  Consultant: Art Landeros  Consultant: Joseluis James  Consultant: Weil, Patricia  Consultant: Manuelito Oneill  Covering Team: Heath, Deirdre  ED Attending: Dena Small  ED Nurse: Eboni Miles  Nurse: Liz Hodgson  Nurse: Romulo Bean  Nurse: Nohemi García  Nurse: Courtney Gongora  Nurse: Alisa Terry  Ordered: ServiceAccount, [facility] CenterMLM  Ordered: Doctor, Unknown  Ordered: ADM, User  Ordered: Pahlavan, Mohsen  Ordered: Caitlyn Bourgeois  Outpatient Provider: Diamond Larson  Outpatient Provider: Pahlavan, Mohsen  Outpatient Provider: Pahlavan, Mohsen  Outpatient Provider: Dev Shepherd  Override: Lucia Earl  PCA/Nursing Assistant: Vilma Munoz  PCA/Nursing Assistant: Melany Dejesus  Registered Dietitian: Sis Junior  Respiratory Therapy: Kalee Sanchez  Respiratory Therapy: Yves Javed  : Bushra Bui  Speech Pathology: Aissatou Oneal  Speech Pathology: Nicolasa Lima

## 2023-05-18 NOTE — PROGRESS NOTE ADULT - ASSESSMENT
71yo female bib ems with cough for 2 days. pt c/o dry cough, no fever, chills no sob, pt states she got dialysis yesterday, +smoking hx no other complaintsRVP positive for enterovirus /Chest xray at [facility] showed L lung opacification .In ER found to have rapid afib and seen by cardiologist Admitted  to telemetry unit for monitoring , send 3 sets of cardiac enzymes to rule out acute coronary event, obtain ECHO to evaluate LVEF, cardiology consult  ,continue current management, O2 supply, anticoagulation plan as per cardiology consult  chest CT that showed bilateral effusion more in left with compressive atelectasis vs consolidation. Admit to monitored unit for cardiac monitoring, obtain echo to evaluate LVEF, intravenous diuresis as per card consult , monitor ins/outs, monitor renal profile and electrolytes closely ,send 3 sets of enzymes, O2 supply, serial chest xrays, monitor weights and oral intake of fluids, nutritionist consult .Seen b y pulmonologist and ID consult Procalcitonin 0.9 WBC on admission 12k Tmax 99.1 This could be just viral infection causing cough but since Chest CT has questionable consolidations, will cover for CAP. Also procalcitonin is slightly high due to ESRD. Palliative care consult requested ,to discuss advance directives and complete MOLST

## 2023-05-18 NOTE — PROGRESS NOTE ADULT - ASSESSMENT
REASON FOR VISIT  .. Management of problems listed below      NOTEWORTHY FINDINGS.    REVIEW OF SYMPTOMS   Able to give ROS  Yes     RELIABILITY +/-   CONSTITUTIONAL Weakness Yes    ENDOCRINE  No heat or cold intolerance    ALLERGY No hives  Sore throat No stridor  RESP Shortness of breath YES   NEURO New weakness No   CARDIAC   Palpitations No         PHYSICAL EXAM    HEENT Unremarkable  atraumatic   RESP Fair air entry  Harsh breath sound   CARDIAC S1 S2 No S3     NO JVD    ABDOMEN No hepatosplenomegaly   PEDAL EDEMA present No calf tenderness  NO rash     BEST PRACTICE ISSUES.    HOB ELEVATN.   .. Yes  DVT PPLX.   ..    5/17/2023 HPSC   ELDER PPLX.   ..    5/17/2023 PROTONIX 40     DIET.    ..  5/17/2023 RENAL   IV fl.  ..      PROCEDURES.  .. 5/18/2023 l thoracentesis 1.5l removd sharan colored   PAST PROCEDURES.    ..     GENERAL DATA .   GOC.    ..      ALLGY.  ..    latex                     WT.  ..  5/17/2023 59  BMI.  ..   5/17/2023 19                 ICU STAY.   .. none    DRIPS.  .. CARD 5/17     ABGS.    VS/ PO/IO/ VENT/ DRIPS.   5/18/2023 afeb 78 108 150/76  5/18/2023 3l 96%     OVERALL.  . 72-year-old female former smoker with history of A-fib (not on ac sec fall risk, diabetes, hypertension, hyperlipidemia, ESRD hemodialysis, CHF, CAD/CABG, PVD was sent from [facility] 5/17/2023 with cough for 2 days. pt c/o dry cough, no fever, chills no sob, pt states she got dialysis 5/16/2023 ,   .. Pt was recently admitted 3/7-3/11/2023 and before that 2/22-2/25/2023   .. 5/17/2023 No antibio use last 3 m   .. Pt admitted 5/17/2023 Pulm consulted   COURSE   . Found to have enterorhinovirus and large l effs  . 5/18/2023 l thora done      PROBLEMS/PLAN .  . ENTERORHINOVIRUS INFECTION RESP TRACT  .. Supportive Rx      . PLEURAL EFFUSION   .. Thoracentesis done 5/18/2023 l side   . PNEUMONIA 5/17/2023  .... 5/17/2023 JOSE ALEJANDRO CHEEK   . CAD.  . HYTN.   . CHF.  . ESRD.      PMH.   CAD.  .. HISTORY ho cabg  A fib  .. 3/7/2023 Not on ac sec multiple falls  PAD  .. sp R-> L bifem - fem BK pop bypass   .. Fem pop bypass 6/21/2022   .. Partial ray amputation r great toe 6/22/2022   ESRD   .. On HD     CURRENT ISSUES.  .. RO PNEUMONIA Rocephin startd 5/17/2023   .. LARGE L EFFSN 5/18/2023 l Thorac done 1.5l sharan   .. AFRVR 5/17/2023 Cardizem drip startd     .      TIME SPENT   Over 25 minutes aggregate care time spent on encounter; activities included   direct patient care, counseling and/or coordinating care reviewing notes, lab data/ imaging , discussion with multidisciplinary team/ patient  /family and explaining in detail risks, benefits, alternatives  of the recommendations     JOSE F LAO 72 f 5/17/2023 1948 DR HARRY ALBRIGHT

## 2023-05-18 NOTE — DIETITIAN INITIAL EVALUATION ADULT - NSFNSPHYEXAMSKINFT_GEN_A_CORE
Pressure Injury 1: sacrum, Stage II  Pressure Injury 2: none, none  Pressure Injury 3: none, none  Pressure Injury 4: none, none  Pressure Injury 5: none, none  Pressure Injury 6: none, none  Pressure Injury 7: none, none  Pressure Injury 8: none, none  Pressure Injury 9: none, none  Pressure Injury 10: none, none  Pressure Injury 11: none, none Pressure Injury 1: sacrum, Stage II

## 2023-05-18 NOTE — PATIENT CHOICE NOTE. - NSPTCHOICESTATE_GEN_ALL_CORE

## 2023-05-18 NOTE — DIETITIAN NUTRITION RISK NOTIFICATION - ETIOLOGY-BASIS
Chronic illness Detail Level: Simple Additional Notes: Patient instructed that if she wants the cyst removed, she should see a hand surgeon.

## 2023-05-18 NOTE — PATIENT PROFILE ADULT - NSPROIMPLANTSMEDDEV_GEN_A_NUR
Daily Note     Today's date: 2019  Patient name: Ambika Miller  : 1962  MRN: [id number]  Referring provider: Daphne Lafleur DPM  Dx:   Encounter Diagnosis     ICD-10-CM    1  Posterior tibial tendinitis of left lower extremity [id number]                   Subjective: Patient states he has no pain  He reports feeling pretty much fine since before he even came in for his evaluation  Objective: See treatment diary below             Diagnosis: L post  Tib  Tendonitis    Precautions: -   Manuals          IASTM   KLS         TCJ mobs                                         Exercise Diary             Bike   5 min                        gastroc S   :20x5         soleous S   :20x5                        Eccentric heel raises   70# 2x10                                                                                                                                                                                  Modalities             CP PRN   def                                            Assessment:Initiated outlined program  Decrease tenderness with IASTM to posterior tendon  Cued to maintain proper technique  He reports no complaints post session  Plan: Progress treatment as tolerated 
PT Discharge    Today's date: 2019  Patient name: Marcellus Heimlich  : 1962  MRN: [id number]  Referring provider: Lety Burton DPM  Dx:   Encounter Diagnosis     ICD-10-CM    1  Posterior tibial tendinitis of left lower extremity [id number]        Start Time: 1200  Stop Time: 1235  Total time in clinic (min): 35 minutes    Assessment/Plan    Subjective    Objective    Flowsheet Rows      Most Recent Value   PT/OT G-Codes   Current Score  99   Projected Score  79             Pt states he feels better and wishes to be d/c from PT at this time 
Vascular stents/Clips

## 2023-05-18 NOTE — PROGRESS NOTE ADULT - SUBJECTIVE AND OBJECTIVE BOX
PROGRESS NOTE  Patient is a 72y old  Female who presents with a chief complaint of ABNORMAL XRAY OF CHEST (18 May 2023 15:13)    Chart and available morning labs /imaging are reviewed electronically , urgent issues addressed . More information  is being added upon completion of rounds , when more information is collected and management discussed with consultants , medical staff and social service/case management on the floor   OVERNIGHT  No new issues reported by medical staff . All above noted Patient is resting in a bed comfortably . .No distress noted   Thoracocenthesis is ordered by Dr Landeros ,HD is scheduled today   HPI:  71yo female bib ems with cough for 2 days. pt c/o dry cough, no fever, chills no sob, pt states she got dialysis yesterday, +smoking hx no other complaintsRVP positive for enterovirus /[facility] showed L lung opacification .In ER found to have rapid afib and seen by cardiologist Admitted  to telemetry unit for monitoring , send 3 sets of cardiac enzymes to rule out acute coronary event, obtain ECHO to evaluate LVEF, cardiology consult  ,continue current management, O2 supply, anticoagulation plan as per cardiology consult  chest CT that showed bilateral effusion more in left with compressive atelectasis vs consolidation. Admit to monitored unit for cardiac monitoring, obtain echo to evaluate LVEF, intravenous diuresis as per card consult , monitor ins/outs, monitor renal profile and electrolytes closely ,send 3 sets of enzymes, O2 supply, serial chest xrays, monitor weights and oral intake of fluids, nutritionist consult .Seen b y pulmonologist and ID consult Procalcitonin 0.9 WBC on admission 12k Tmax 99.1 This could be just viral infection causing cough but since Chest CT has questionable consolidations, will cover for CAP. Also procalcitonin is slightly high due to ESRD. Palliative care consult requested ,to discuss advance directives and complete MOLST  (17 May 2023 14:01)    PAST MEDICAL & SURGICAL HISTORY:  HTN (hypertension)      h/o Anxiety attack      Depression      h/o Myocardial infarct 2007      h/o Hepatitis A 1969  currently resolved, no symptoms      Murmur, cardiac      h/o Smoking  quitted 3/2012      Anemia secondary to renal failure      ESRD on dialysis      Falls      Ataxia      Type 2 diabetes mellitus      Peripheral vascular disease, unspecified      CAD (coronary artery disease)      Diabetes      coronary stent 2007      s/p Ovarian cyst removal      s/p surgical removal of benign Skin lesion epigastric area      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          MEDICATIONS  (STANDING):  aspirin enteric coated 81 milliGRAM(s) Oral daily  atorvastatin 40 milliGRAM(s) Oral at bedtime  cefTRIAXone   IVPB 1000 milliGRAM(s) IV Intermittent every 24 hours  cloNIDine 0.1 milliGRAM(s) Oral two times a day  dextrose 5%. 1000 milliLiter(s) (50 mL/Hr) IV Continuous <Continuous>  dextrose 5%. 1000 milliLiter(s) (100 mL/Hr) IV Continuous <Continuous>  dextrose 50% Injectable 12.5 Gram(s) IV Push once  dextrose 50% Injectable 25 Gram(s) IV Push once  dextrose 50% Injectable 25 Gram(s) IV Push once  diltiazem Infusion 5 mG/Hr (5 mL/Hr) IV Continuous <Continuous>  dronabinol 2.5 milliGRAM(s) Oral two times a day before meals  gabapentin 100 milliGRAM(s) Oral at bedtime  glucagon  Injectable 1 milliGRAM(s) IntraMuscular once  heparin   Injectable 5000 Unit(s) SubCutaneous every 12 hours  imipramine 50 milliGRAM(s) Oral daily  insulin lispro (ADMELOG) corrective regimen sliding scale   SubCutaneous three times a day before meals  melatonin 3 milliGRAM(s) Oral at bedtime  metoprolol tartrate 100 milliGRAM(s) Oral two times a day  multivitamin 1 Tablet(s) Oral daily  pantoprazole    Tablet 40 milliGRAM(s) Oral before breakfast  risperiDONE   Tablet 0.5 milliGRAM(s) Oral at bedtime  senna 2 Tablet(s) Oral at bedtime    MEDICATIONS  (PRN):  acetaminophen     Tablet .. 650 milliGRAM(s) Oral every 6 hours PRN Temp greater or equal to 38C (100.4F), Mild Pain (1 - 3)  aluminum hydroxide/magnesium hydroxide/simethicone Suspension 30 milliLiter(s) Oral every 4 hours PRN Dyspepsia  dextrose Oral Gel 15 Gram(s) Oral once PRN Blood Glucose LESS THAN 70 milliGRAM(s)/deciliter  ondansetron Injectable 4 milliGRAM(s) IV Push every 8 hours PRN Nausea and/or Vomiting      OBJECTIVE    T(C): 36.3 (05-18-23 @ 14:50), Max: 37.2 (05-18-23 @ 07:45)  HR: 77 (05-18-23 @ 14:50) (77 - 111)  BP: 118/61 (05-18-23 @ 14:50) (115/62 - 159/90)  RR: 20 (05-18-23 @ 14:50) (18 - 24)  SpO2: 99% (05-18-23 @ 14:50) (95% - 100%)  Wt(kg): --  I&O's Summary    17 May 2023 07:01  -  18 May 2023 07:00  --------------------------------------------------------  IN: 30 mL / OUT: 0 mL / NET: 30 mL    18 May 2023 07:01  -  18 May 2023 16:25  --------------------------------------------------------  IN: 0 mL / OUT: 1310 mL / NET: -1310 mL          REVIEW OF SYSTEMS:  CONSTITUTIONAL: No fever, weight loss, or fatigue  EYES: No eye pain, visual disturbances, or discharge  ENMT:   No sinus or throat pain  NECK: No pain or stiffness  RESPIRATORY: No cough, wheezing, chills or hemoptysis; No shortness of breath  CARDIOVASCULAR: No chest pain, palpitations, dizziness, or leg swelling  GASTROINTESTINAL: No abdominal pain. No nausea, vomiting; No diarrhea or constipation. No melena or hematochezia.  GENITOURINARY: No dysuria, frequency, hematuria, or incontinence  NEUROLOGICAL: No headaches, memory loss, loss of strength, numbness, or tremors  SKIN: No itching, burning, rashes, or lesions   MUSCULOSKELETAL: No joint pain or swelling; No muscle, back, or extremity pain    PHYSICAL EXAM:  Appearance: NAD. VS past 24 hrs -as above   HEENT:   Moist oral mucosa. Conjunctiva clear b/l.   Neck : supple  Respiratory: Lungs CTAB.  Gastrointestinal:  Soft, nontender. No rebound. No rigidity. BS present	  Cardiovascular: RRR ,S1S2 present  Neurologic: Non-focal. Moving all extremities.  Extremities: No edema. No erythema. No calf tenderness.  Skin: No rashes, No ecchymoses, No cyanosis.	  wounds ,skin lesions-See skin assesment flow sheet   LABS:                        10.6   15.50 )-----------( 296      ( 18 May 2023 06:30 )             33.7     05-18    134<L>  |  100  |  39<H>  ----------------------------<  114<H>  5.0   |  26  |  5.40<H>    Ca    9.1      18 May 2023 06:30  Phos  4.8     05-18  Mg     2.2     05-18    TPro  6.4  /  Alb  2.7<L>  /  TBili  0.7  /  DBili  x   /  AST  21  /  ALT  17  /  AlkPhos  104  05-18    CAPILLARY BLOOD GLUCOSE      POCT Blood Glucose.: 126 mg/dL (18 May 2023 11:57)  POCT Blood Glucose.: 118 mg/dL (18 May 2023 07:52)  POCT Blood Glucose.: 130 mg/dL (17 May 2023 21:17)  POCT Blood Glucose.: 142 mg/dL (17 May 2023 17:00)    PT/INR - ( 18 May 2023 06:30 )   PT: 15.6 sec;   INR: 1.33 ratio         PTT - ( 17 May 2023 09:40 )  PTT:27.9 sec      RADIOLOGY & ADDITIONAL TESTS:   reviewed elctronically  ASSESSMENT/PLAN: 	  45 minutes aggregate time was spent on this visit, 50% visit time spent in care co-ordination with other attendings and counselling patient .I have discussed care plan with patient / HCP/family member ,who expressed understanding of problems treatment and their effect and side effects, alternatives in details. I have asked if they have any questions and concerns and appropriately addressed them to best of my ability.

## 2023-05-18 NOTE — PROGRESS NOTE ADULT - SUBJECTIVE AND OBJECTIVE BOX
[facility]  INFECTIOUS DISEASES   [address] [place] [address]  Tel: [phone]     Fax: [phone]  ========================================================  MD Zita Alejandra Kaushal, MD Cho, Michelle, MD Sunjit, Jaspal, MD  ========================================================    [id number]  SHILO KOHLER     Follow up: Pneumonia    Lying in bed, looks comfortable, awake and alert, NAD but on o2 with NC.   Had thoracentesis today.     PAST MEDICAL & SURGICAL HISTORY:  HTN (hypertension)  h/o Anxiety attack  Depression  h/o Myocardial infarct 2007  h/o Hepatitis A 1969  currently resolved, no symptoms  Murmur, cardiac  h/o Smoking  quitted 3/2012  Anemia secondary to renal failure  ESRD on dialysis  Falls  Ataxia  Type 2 diabetes mellitus  Peripheral vascular disease, unspecified  CAD (coronary artery disease)  Diabetes  coronary stent 2007  s/p Ovarian cyst removal  s/p surgical removal of benign Skin lesion epigastric area  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    Social Hx: Former smoker, no ETOH or drugs     FAMILY HISTORY:  FH: myocardial infarction (Father)    FH: myocardial infarction (Father)    Family history of type 2 diabetes mellitus in brother (Sibling)    Allergies  No Known Drug Allergies  latex (Hives)    MEDICATIONS  (STANDING):  aspirin enteric coated 81 milliGRAM(s) Oral daily  atorvastatin 40 milliGRAM(s) Oral at bedtime  cefTRIAXone   IVPB 1000 milliGRAM(s) IV Intermittent every 24 hours  cloNIDine 0.1 milliGRAM(s) Oral two times a day  dextrose 5%. 1000 milliLiter(s) (50 mL/Hr) IV Continuous <Continuous>  dextrose 5%. 1000 milliLiter(s) (100 mL/Hr) IV Continuous <Continuous>  dextrose 50% Injectable 12.5 Gram(s) IV Push once  dextrose 50% Injectable 25 Gram(s) IV Push once  dextrose 50% Injectable 25 Gram(s) IV Push once  diltiazem Infusion 5 mG/Hr (5 mL/Hr) IV Continuous <Continuous>  dronabinol 2.5 milliGRAM(s) Oral two times a day before meals  gabapentin 100 milliGRAM(s) Oral at bedtime  glucagon  Injectable 1 milliGRAM(s) IntraMuscular once  heparin   Injectable 5000 Unit(s) SubCutaneous every 12 hours  imipramine 50 milliGRAM(s) Oral daily  insulin lispro (ADMELOG) corrective regimen sliding scale   SubCutaneous three times a day before meals  melatonin 3 milliGRAM(s) Oral at bedtime  metoprolol tartrate 100 milliGRAM(s) Oral two times a day  multivitamin 1 Tablet(s) Oral daily  pantoprazole    Tablet 40 milliGRAM(s) Oral before breakfast  risperiDONE   Tablet 0.5 milliGRAM(s) Oral at bedtime  senna 2 Tablet(s) Oral at bedtime     REVIEW OF SYSTEMS:  CONSTITUTIONAL:  No Fever or chills  HEENT:  No diplopia or blurred vision.  No sore throat or runny nose.  CARDIOVASCULAR:  No chest pain or SOB.  RESPIRATORY:  No cough, shortness of breath, PND or orthopnea.  GASTROINTESTINAL:  No nausea, vomiting or diarrhea.  GENITOURINARY:  No dysuria, frequency or urgency. No Blood in urine  MUSCULOSKELETAL:  no joint aches, no muscle pain  SKIN:  No change in skin, hair or nails.  NEUROLOGIC:  No paresthesias or weakness.  PSYCHIATRIC:  No disorder of thought or mood.  ENDOCRINE:  No heat or cold intolerance, polyuria or polydipsia.  HEMATOLOGICAL:  No easy bruising or bleeding.     Physical Exam:  Vital Signs Last 24 Hrs  T(C): 36.8 (18 May 2023 13:56), Max: 37.2 (18 May 2023 07:45)  T(F): 98.2 (18 May 2023 13:56), Max: 98.9 (18 May 2023 07:45)  HR: 85 (18 May 2023 13:56) (78 - 126)  BP: 120/71 (18 May 2023 13:56) (115/62 - 159/90)  BP(mean): --  RR: 21 (18 May 2023 13:56) (18 - 24)  SpO2: 99% (18 May 2023 13:56) (95% - 100%)  Parameters below as of 18 May 2023 13:56  Patient On (Oxygen Delivery Method): nasal cannula  O2 Flow (L/min): 3  GEN: NAD  HEENT: normocephalic and atraumatic. EOMI. PERRL.    NECK: Supple.  No lymphadenopathy   LUNGS: Decreased breath sounds bilaterally more in lower lobes  with some crackles both side   HEART: Regular rate and rhythm   ABDOMEN: Soft, nontender, and nondistended.  Positive bowel sounds.    : No CVA tenderness  EXTREMITIES: Without edema.  NEUROLOGIC: grossly intact.  PSYCHIATRIC: Appropriate affect .  SKIN: No rash     Labs:                        10.6   15.50 )-----------( 296      ( 18 May 2023 06:30 )             33.7     05-18    134<L>  |  100  |  39<H>  ----------------------------<  114<H>  5.0   |  26  |  5.40<H>    Ca    9.1      18 May 2023 06:30  Phos  4.8     05-18  Mg     2.2     05-18    TPro  6.4  /  Alb  2.7<L>  /  TBili  0.7  /  DBili  x   /  AST  21  /  ALT  17  /  AlkPhos  104  05-18    WBC Count: 15.50 K/uL (05-18-23 @ 06:30)  WBC Count: 12.18 K/uL (05-17-23 @ 09:40)    Creatinine, Serum: 5.40 mg/dL (05-18-23 @ 06:30)  Creatinine, Serum: 4.60 mg/dL (05-17-23 @ 09:40)    Procalcitonin, Serum: 0.86 ng/mL (05-18-23 @ 06:30)  Procalcitonin, Serum: 0.90 ng/mL (05-17-23 @ 09:40)     SARS-CoV-2: NotDetec (05-17-23 @ 09:40)    All imaging and other data have been reviewed.  < from: CT Chest No Cont (05.17.23 @ 10:15) >  IMPRESSION:  Small to moderate right-sided pleural effusion and associated compressive   atelectasis.  Moderate left-sided pleural effusion with atelectasis/airspace   consolidation left lower lobe.  Groundglass and airspace consolidation posterior segment left upper lobe,   could reflect edema or superimposed infection.  Other findings as discussed above.    Assessment and Plan:   73yo woman with PMH of HTN, DM2, ESRD on HD, CAD, depression/anxiety and former smoker was seen in ED with cough for 2 days.   RVP positive for enterovirus  chest CT that showed bilateral effusion more in left with compressive atelectasis vs consolidation.   Procalcitonin 0.9  WBC on admission 12k  Tmax 99.1  RVP enterovirus +   5/18 left thoracentesis     Recommendations:  - Leukocytosis slightly higher today 12-->15k, will monitor  - Will follow cultures   - Continue ceftriaxone 1gm daily  - Follow up Legionella U ag and MRSA PCR  - Will follow pleural fluid analysis and culture, most likely transudate     Will follow.    Geovany Long MD  Division of Infectious Diseases   Please call ID service at [phone] with any question.    35 minutes spent on total encounter assessing patient, examination, chart review, counseling and coordinating care by the attending physician/nurse/care manager.

## 2023-05-18 NOTE — DIETITIAN INITIAL EVALUATION ADULT - PERTINENT MEDS FT
MEDICATIONS  (STANDING):  aspirin enteric coated 81 milliGRAM(s) Oral daily  atorvastatin 40 milliGRAM(s) Oral at bedtime  cefTRIAXone   IVPB 1000 milliGRAM(s) IV Intermittent every 24 hours  cloNIDine 0.1 milliGRAM(s) Oral two times a day  dextrose 5%. 1000 milliLiter(s) (100 mL/Hr) IV Continuous <Continuous>  dextrose 5%. 1000 milliLiter(s) (50 mL/Hr) IV Continuous <Continuous>  dextrose 50% Injectable 12.5 Gram(s) IV Push once  dextrose 50% Injectable 25 Gram(s) IV Push once  dextrose 50% Injectable 25 Gram(s) IV Push once  diltiazem Infusion 5 mG/Hr (5 mL/Hr) IV Continuous <Continuous>  gabapentin 100 milliGRAM(s) Oral at bedtime  glucagon  Injectable 1 milliGRAM(s) IntraMuscular once  heparin   Injectable 5000 Unit(s) SubCutaneous every 12 hours  imipramine 50 milliGRAM(s) Oral daily  insulin lispro (ADMELOG) corrective regimen sliding scale   SubCutaneous three times a day before meals  melatonin 3 milliGRAM(s) Oral at bedtime  metoprolol tartrate 100 milliGRAM(s) Oral two times a day  multivitamin 1 Tablet(s) Oral daily  pantoprazole    Tablet 40 milliGRAM(s) Oral before breakfast  risperiDONE   Tablet 0.5 milliGRAM(s) Oral at bedtime  senna 2 Tablet(s) Oral at bedtime    MEDICATIONS  (PRN):  acetaminophen     Tablet .. 650 milliGRAM(s) Oral every 6 hours PRN Temp greater or equal to 38C (100.4F), Mild Pain (1 - 3)  aluminum hydroxide/magnesium hydroxide/simethicone Suspension 30 milliLiter(s) Oral every 4 hours PRN Dyspepsia  dextrose Oral Gel 15 Gram(s) Oral once PRN Blood Glucose LESS THAN 70 milliGRAM(s)/deciliter  ondansetron Injectable 4 milliGRAM(s) IV Push every 8 hours PRN Nausea and/or Vomiting

## 2023-05-18 NOTE — DIETITIAN INITIAL EVALUATION ADULT - SIGNS/SYMPTOMS
as evidenced by ESRD on HD, stage II pressure ulcer to sacrum.  as evidenced by NFPE findings- mild/moderate muscle depletion and fat loss.

## 2023-05-18 NOTE — DIETITIAN NUTRITION RISK NOTIFICATION - TREATMENT: THE FOLLOWING DIET HAS BEEN RECOMMENDED
Diet, Renal Restrictions:   For patients receiving Renal Replacement - No Protein Restr, No Conc K, No Conc Phos, Low Sodium (05-17-23 @ 13:46) [Active]

## 2023-05-18 NOTE — DIETITIAN INITIAL EVALUATION ADULT - ADD RECOMMEND
1) Recommend renal, consistent carbohydrate diet with 1500ml fluid restriction  2) Recommend Nepro TID  3) Recommend Nephrovite bid  4) Monitor po intake, diet tolerance, weight trends, labs, GI function, skin integrity

## 2023-05-18 NOTE — PROCEDURE NOTE - PROCEDURE FINDINGS AND DETAILS
1310cc of sharan pleuritic fluid removed from left thorax under sterile conditions and ultrasound guidance.  Post procedure CXR was ordered.

## 2023-05-18 NOTE — DIETITIAN INITIAL EVALUATION ADULT - NS FNS DIET ORDER
Diet, Renal Restrictions:   For patients receiving Renal Replacement - No Protein Restr, No Conc K, No Conc Phos, Low Sodium (05-17-23 @ 13:46)

## 2023-05-18 NOTE — PROGRESS NOTE ADULT - SUBJECTIVE AND OBJECTIVE BOX
Patient is a 72y Female whom presented to the hospital with esrd on hd     PAST MEDICAL & SURGICAL HISTORY:  HTN (hypertension)      h/o Anxiety attack      Depression      h/o Myocardial infarct 2007      h/o Hepatitis A 1969  currently resolved, no symptoms      Murmur, cardiac      h/o Smoking  quitted 3/2012      Anemia secondary to renal failure      ESRD on dialysis      Falls      Ataxia      Type 2 diabetes mellitus      Peripheral vascular disease, unspecified      CAD (coronary artery disease)      Diabetes      coronary stent 2007      s/p Ovarian cyst removal      s/p surgical removal of benign Skin lesion epigastric area      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          MEDICATIONS  (STANDING):  aspirin enteric coated 81 milliGRAM(s) Oral daily  atorvastatin 40 milliGRAM(s) Oral at bedtime  cefTRIAXone   IVPB 1000 milliGRAM(s) IV Intermittent every 24 hours  cloNIDine 0.1 milliGRAM(s) Oral two times a day  dextrose 5%. 1000 milliLiter(s) (50 mL/Hr) IV Continuous <Continuous>  dextrose 5%. 1000 milliLiter(s) (100 mL/Hr) IV Continuous <Continuous>  dextrose 50% Injectable 12.5 Gram(s) IV Push once  dextrose 50% Injectable 25 Gram(s) IV Push once  dextrose 50% Injectable 25 Gram(s) IV Push once  diltiazem Infusion 5 mG/Hr (5 mL/Hr) IV Continuous <Continuous>  gabapentin 100 milliGRAM(s) Oral at bedtime  glucagon  Injectable 1 milliGRAM(s) IntraMuscular once  heparin   Injectable 5000 Unit(s) SubCutaneous every 12 hours  imipramine 50 milliGRAM(s) Oral daily  insulin lispro (ADMELOG) corrective regimen sliding scale   SubCutaneous three times a day before meals  melatonin 3 milliGRAM(s) Oral at bedtime  metoprolol tartrate 100 milliGRAM(s) Oral two times a day  multivitamin 1 Tablet(s) Oral daily  pantoprazole    Tablet 40 milliGRAM(s) Oral before breakfast  risperiDONE   Tablet 0.5 milliGRAM(s) Oral at bedtime  senna 2 Tablet(s) Oral at bedtime      Allergies    No Known Drug Allergies  latex (Hives)    Intolerances        SOCIAL HISTORY:  Denies ETOh,Smoking,     FAMILY HISTORY:  FH: myocardial infarction (Father)    FH: myocardial infarction (Father)    Family history of type 2 diabetes mellitus in brother (Sibling)        REVIEW OF SYSTEMS:    CONSTITUTIONAL: No weakness, fevers or chills  EYES/ENT: No visual changes;  no throat pain   NECK: No pain or stiffness  RESPIRATORY: No cough, wheezing, hemoptysis; No shortness of breath  CARDIOVASCULAR: No chest pain or palpitations  GASTROINTESTINAL: No abdominal or epigastric pain. No nausea, vomiting,     No diarrhea or constipation. No melena   GENITOURINARY: No dysuria, frequency or hematuria  NEUROLOGICAL: No numbness or weakness  SKIN: dry          MEDICATIONS  (STANDING):  aspirin enteric coated 81 milliGRAM(s) Oral daily  atorvastatin 40 milliGRAM(s) Oral at bedtime  cefTRIAXone   IVPB 1000 milliGRAM(s) IV Intermittent every 24 hours  cloNIDine 0.1 milliGRAM(s) Oral two times a day  dextrose 5%. 1000 milliLiter(s) (50 mL/Hr) IV Continuous <Continuous>  dextrose 5%. 1000 milliLiter(s) (100 mL/Hr) IV Continuous <Continuous>  dextrose 50% Injectable 12.5 Gram(s) IV Push once  dextrose 50% Injectable 25 Gram(s) IV Push once  dextrose 50% Injectable 25 Gram(s) IV Push once  diltiazem Infusion 5 mG/Hr (5 mL/Hr) IV Continuous <Continuous>  dronabinol 2.5 milliGRAM(s) Oral two times a day before meals  gabapentin 100 milliGRAM(s) Oral at bedtime  glucagon  Injectable 1 milliGRAM(s) IntraMuscular once  heparin   Injectable 5000 Unit(s) SubCutaneous every 12 hours  imipramine 50 milliGRAM(s) Oral daily  insulin lispro (ADMELOG) corrective regimen sliding scale   SubCutaneous three times a day before meals  melatonin 3 milliGRAM(s) Oral at bedtime  metoprolol tartrate 100 milliGRAM(s) Oral two times a day  mirtazapine 7.5 milliGRAM(s) Oral at bedtime  multivitamin 1 Tablet(s) Oral daily  pantoprazole    Tablet 40 milliGRAM(s) Oral before breakfast  risperiDONE   Tablet 0.5 milliGRAM(s) Oral at bedtime  senna 2 Tablet(s) Oral at bedtime                              10.6   15.50 )-----------( 296      ( 18 May 2023 06:30 )             33.7       CBC Full  -  ( 18 May 2023 06:30 )  WBC Count : 15.50 K/uL  RBC Count : 3.40 M/uL  Hemoglobin : 10.6 g/dL  Hematocrit : 33.7 %  Platelet Count - Automated : 296 K/uL  Mean Cell Volume : 99.1 fl  Mean Cell Hemoglobin : 31.2 pg  Mean Cell Hemoglobin Concentration : 31.5 gm/dL  Auto Neutrophil # : 12.69 K/uL  Auto Lymphocyte # : 0.65 K/uL  Auto Monocyte # : 1.49 K/uL  Auto Eosinophil # : 0.42 K/uL  Auto Basophil # : 0.13 K/uL  Auto Neutrophil % : 81.9 %  Auto Lymphocyte % : 4.2 %  Auto Monocyte % : 9.6 %  Auto Eosinophil % : 2.7 %  Auto Basophil % : 0.8 %      05-18    134<L>  |  100  |  39<H>  ----------------------------<  114<H>  5.0   |  26  |  5.40<H>    Ca    9.1      18 May 2023 06:30  Phos  4.8     05-18  Mg     2.2     05-18    TPro  6.4  /  Alb  2.7<L>  /  TBili  0.7  /  DBili  x   /  AST  21  /  ALT  17  /  AlkPhos  104  05-18      CAPILLARY BLOOD GLUCOSE      POCT Blood Glucose.: 102 mg/dL (18 May 2023 17:13)  POCT Blood Glucose.: 126 mg/dL (18 May 2023 11:57)  POCT Blood Glucose.: 118 mg/dL (18 May 2023 07:52)  POCT Blood Glucose.: 130 mg/dL (17 May 2023 21:17)      Vital Signs Last 24 Hrs  T(C): 36.7 (18 May 2023 20:00), Max: 37.2 (18 May 2023 07:45)  T(F): 98 (18 May 2023 20:00), Max: 98.9 (18 May 2023 07:45)  HR: 85 (18 May 2023 20:00) (76 - 111)  BP: 149/77 (18 May 2023 20:00) (98/57 - 159/90)  BP(mean): --  RR: 20 (18 May 2023 20:00) (18 - 24)  SpO2: 99% (18 May 2023 20:00) (95% - 100%)    Parameters below as of 18 May 2023 20:00  Patient On (Oxygen Delivery Method): nasal cannula  O2 Flow (L/min): 3          PT/INR - ( 18 May 2023 06:30 )   PT: 15.6 sec;   INR: 1.33 ratio         PTT - ( 17 May 2023 09:40 )  PTT:27.9 sec            BSA (m2): 1.72 (05-17 @ 08:32)    PHYSICAL EXAM:    Constitutional: NAD  HEENT: conjunctive   clear   Neck:  No JVD  Respiratory: CTAB  Cardiovascular: S1 and S2  Gastrointestinal: BS+, soft, NT/ND  Extremities: No peripheral edema  Neurological: A/O x 3, no focal deficits  Psychiatric: Normal mood, normal affect  : No Renteria  Skin: No rashes  Access: Not applicable    LABS:                        10.5   12.18 )-----------( 354      ( 17 May 2023 09:40 )             33.3     05-17    x   |  x   |  x   ----------------------------<  157<H>  x    |  x   |  x     Ca    9.6      17 May 2023 09:40    TPro  7.0  /  Alb  3.1<L>  /  TBili  0.8  /  DBili  x   /  AST  10<L>  /  ALT  21  /  AlkPhos  119  05-17      Urine Studies:          RADIOLOGY & ADDITIONAL STUDIES:                   Patient is a 72y Female whom presented to the hospital with esrd on hd     PAST MEDICAL & SURGICAL HISTORY:  HTN (hypertension)      h/o Anxiety attack      Depression      h/o Myocardial infarct 2007      h/o Hepatitis A 1969  currently resolved, no symptoms      Murmur, cardiac      h/o Smoking  quitted 3/2012      Anemia secondary to renal failure      ESRD on dialysis      Falls      Ataxia      Type 2 diabetes mellitus      Peripheral vascular disease, unspecified      CAD (coronary artery disease)      Diabetes      coronary stent 2007      s/p Ovarian cyst removal      s/p surgical removal of benign Skin lesion epigastric area      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          MEDICATIONS  (STANDING):  aspirin enteric coated 81 milliGRAM(s) Oral daily  atorvastatin 40 milliGRAM(s) Oral at bedtime  cefTRIAXone   IVPB 1000 milliGRAM(s) IV Intermittent every 24 hours  cloNIDine 0.1 milliGRAM(s) Oral two times a day  dextrose 5%. 1000 milliLiter(s) (50 mL/Hr) IV Continuous <Continuous>  dextrose 5%. 1000 milliLiter(s) (100 mL/Hr) IV Continuous <Continuous>  dextrose 50% Injectable 12.5 Gram(s) IV Push once  dextrose 50% Injectable 25 Gram(s) IV Push once  dextrose 50% Injectable 25 Gram(s) IV Push once  diltiazem Infusion 5 mG/Hr (5 mL/Hr) IV Continuous <Continuous>  gabapentin 100 milliGRAM(s) Oral at bedtime  glucagon  Injectable 1 milliGRAM(s) IntraMuscular once  heparin   Injectable 5000 Unit(s) SubCutaneous every 12 hours  imipramine 50 milliGRAM(s) Oral daily  insulin lispro (ADMELOG) corrective regimen sliding scale   SubCutaneous three times a day before meals  melatonin 3 milliGRAM(s) Oral at bedtime  metoprolol tartrate 100 milliGRAM(s) Oral two times a day  multivitamin 1 Tablet(s) Oral daily  pantoprazole    Tablet 40 milliGRAM(s) Oral before breakfast  risperiDONE   Tablet 0.5 milliGRAM(s) Oral at bedtime  senna 2 Tablet(s) Oral at bedtime      Allergies    No Known Drug Allergies  latex (Hives)    Intolerances        SOCIAL HISTORY:  Denies ETOh,Smoking,     FAMILY HISTORY:  FH: myocardial infarction (Father)    FH: myocardial infarction (Father)    Family history of type 2 diabetes mellitus in brother (Sibling)        REVIEW OF SYSTEMS:    CONSTITUTIONAL: No weakness, fevers or chills  EYES/ENT: No visual changes;  no throat pain   NECK: No pain or stiffness  RESPIRATORY: No cough, wheezing, hemoptysis; No shortness of breath  CARDIOVASCULAR: No chest pain or palpitations  GASTROINTESTINAL: No abdominal or epigastric pain. No nausea, vomiting,     No diarrhea or constipation. No melena   GENITOURINARY: No dysuria, frequency or hematuria  NEUROLOGICAL: No numbness or weakness  SKIN: dry          MEDICATIONS  (STANDING):  aspirin enteric coated 81 milliGRAM(s) Oral daily  atorvastatin 40 milliGRAM(s) Oral at bedtime  cefTRIAXone   IVPB 1000 milliGRAM(s) IV Intermittent every 24 hours  cloNIDine 0.1 milliGRAM(s) Oral two times a day  dextrose 5%. 1000 milliLiter(s) (50 mL/Hr) IV Continuous <Continuous>  dextrose 5%. 1000 milliLiter(s) (100 mL/Hr) IV Continuous <Continuous>  dextrose 50% Injectable 12.5 Gram(s) IV Push once  dextrose 50% Injectable 25 Gram(s) IV Push once  dextrose 50% Injectable 25 Gram(s) IV Push once  diltiazem Infusion 5 mG/Hr (5 mL/Hr) IV Continuous <Continuous>  dronabinol 2.5 milliGRAM(s) Oral two times a day before meals  gabapentin 100 milliGRAM(s) Oral at bedtime  glucagon  Injectable 1 milliGRAM(s) IntraMuscular once  heparin   Injectable 5000 Unit(s) SubCutaneous every 12 hours  imipramine 50 milliGRAM(s) Oral daily  insulin lispro (ADMELOG) corrective regimen sliding scale   SubCutaneous three times a day before meals  melatonin 3 milliGRAM(s) Oral at bedtime  metoprolol tartrate 100 milliGRAM(s) Oral two times a day  mirtazapine 7.5 milliGRAM(s) Oral at bedtime  multivitamin 1 Tablet(s) Oral daily  pantoprazole    Tablet 40 milliGRAM(s) Oral before breakfast  risperiDONE   Tablet 0.5 milliGRAM(s) Oral at bedtime  senna 2 Tablet(s) Oral at bedtime                          9.7    12.09 )-----------( 299      ( 19 May 2023 12:12 )             30.6       CBC Full  -  ( 19 May 2023 12:12 )  WBC Count : 12.09 K/uL  RBC Count : 3.18 M/uL  Hemoglobin : 9.7 g/dL  Hematocrit : 30.6 %  Platelet Count - Automated : 299 K/uL  Mean Cell Volume : 96.2 fl  Mean Cell Hemoglobin : 30.5 pg  Mean Cell Hemoglobin Concentration : 31.7 gm/dL  Auto Neutrophil # : x  Auto Lymphocyte # : x  Auto Monocyte # : x  Auto Eosinophil # : x  Auto Basophil # : x  Auto Neutrophil % : x  Auto Lymphocyte % : x  Auto Monocyte % : x  Auto Eosinophil % : x  Auto Basophil % : x      05-19    133<L>  |  102  |  24<H>  ----------------------------<  170<H>  3.5   |  29  |  3.90<H>    Ca    8.9      19 May 2023 12:12  Phos  4.8     05-18  Mg     2.2     05-18    TPro  6.4  /  Alb  2.7<L>  /  TBili  0.7  /  DBili  x   /  AST  21  /  ALT  17  /  AlkPhos  104  05-18      CAPILLARY BLOOD GLUCOSE      POCT Blood Glucose.: 151 mg/dL (19 May 2023 18:03)  POCT Blood Glucose.: 187 mg/dL (19 May 2023 16:27)  POCT Blood Glucose.: 163 mg/dL (19 May 2023 12:18)  POCT Blood Glucose.: 211 mg/dL (19 May 2023 08:04)  POCT Blood Glucose.: 187 mg/dL (18 May 2023 22:15)      Vital Signs Last 24 Hrs  T(C): 36.4 (19 May 2023 17:28), Max: 36.8 (19 May 2023 04:37)  T(F): 97.5 (19 May 2023 17:28), Max: 98.3 (19 May 2023 04:37)  HR: 71 (19 May 2023 17:28) (62 - 82)  BP: 150/65 (19 May 2023 17:28) (143/74 - 159/85)  BP(mean): --  RR: 18 (19 May 2023 17:28) (18 - 18)  SpO2: 98% (19 May 2023 17:28) (98% - 99%)    Parameters below as of 19 May 2023 17:28  Patient On (Oxygen Delivery Method): nasal cannula  O2 Flow (L/min): 3          PT/INR - ( 18 May 2023 06:30 )   PT: 15.6 sec;   INR: 1.33 ratio                                     PHYSICAL EXAM:    Constitutional: NAD  HEENT: conjunctive   clear   Neck:  No JVD  Respiratory: CTAB  Cardiovascular: S1 and S2  Gastrointestinal: BS+, soft, NT/ND  Extremities: No peripheral edema  Neurological: A/O x 3, no focal deficits  Psychiatric: Normal mood, normal affect  : No Renteria  Skin: No rashes  Access: Not applicable

## 2023-05-18 NOTE — DIETITIAN INITIAL EVALUATION ADULT - OTHER INFO
right normal/left pulsatile mass and hematoma w ecchimotic skin changes Pt is a "71 yo woman with PMH of HTN, DM2, ESRD on HD, CAD, depression/anxiety and former smoker was seen in ED with cough for 2 days."    Visited pt at bedside this am. Pt sleeping soundly during visit. Observed breakfast tray at pt's bedside; pt consumed <25% of meal. No chewing/swallowing difficulties noted. No food allergies. No N/V/D/C per chart review. No BMs documented thus far; bowel regimen rx. CBW on admission 130#. No edema noted. Skin: stage II pressure ulcer to sacrum. Pt currently on renal diet. Pt with hx of DM. Recommend adding consistent carbohydrate restriction to current diet order. Will provide Nepro supplements TID for addtl kcal/protein. Diet education not appropriate at this time.

## 2023-05-18 NOTE — PROGRESS NOTE ADULT - SUBJECTIVE AND OBJECTIVE BOX
CHIEF COMPLAINT/ REASON FOR VISIT  .. Patient was seen to address the  issue listed under PROBLEM LIST which is located toward bottom of this note     SHILO KOHLER    [facility] TELE 309 D1    Allergies    No Known Drug Allergies  latex (Hives)    Intolerances        PAST MEDICAL & SURGICAL HISTORY:  HTN (hypertension)      h/o Anxiety attack      Depression      h/o Myocardial infarct 2007      h/o Hepatitis A 1969  currently resolved, no symptoms      Murmur, cardiac      h/o Smoking  quitted 3/2012      Anemia secondary to renal failure      ESRD on dialysis      Falls      Ataxia      Type 2 diabetes mellitus      Peripheral vascular disease, unspecified      CAD (coronary artery disease)      Diabetes      coronary stent 2007      s/p Ovarian cyst removal      s/p surgical removal of benign Skin lesion epigastric area      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          FAMILY HISTORY:  FH: myocardial infarction (Father)    FH: myocardial infarction (Father)    Family history of type 2 diabetes mellitus in brother (Sibling)        Home Medications:  acetaminophen 325 mg oral tablet: 2 tab(s) orally every 6 hours, As needed, Temp greater or equal to 38C (100.4F), Mild Pain (1 - 3) (17 May 2023 14:45)  Admelog SoloStar 100 units/mL injectable solution: injectable 3 times a day (before meals)sliding scale  (17 May 2023 14:45)  aspirin 81 mg oral delayed release tablet: 1 tab(s) orally once a day (at bedtime) (17 May 2023 14:45)  atorvastatin 40 mg oral tablet: 1 tab(s) orally once a day (at bedtime) (17 May 2023 14:45)  Basaglar KwikPen 100 units/mL subcutaneous solution: 18 unit(s) subcutaneous once a day (at bedtime) (17 May 2023 14:42)  cloNIDine 0.1 mg oral tablet: 1 tab(s) orally 2 times a day (17 May 2023 14:45)  gabapentin 100 mg oral capsule: 1 cap(s) orally once a day (at bedtime) (17 May 2023 14:41)  imipramine 50 mg oral tablet: 1 tab(s) orally once a day (17 May 2023 14:45)  Lokelma 10 g oral powder for reconstitution: 10 gram(s) orally 2 times a week on Wed and Sat  (17 May 2023 14:45)  metoprolol tartrate 100 mg oral tablet: 1 tab(s) orally once a day (at bedtime) (17 May 2023 14:45)  Mucinex 600 mg oral tablet, extended release: 1 tab(s) orally 2 times a day (17 May 2023 14:43)  Nephro-Alfie oral tablet: 1 tab(s) orally once a day (17 May 2023 14:45)  PANTOPRAZOLE 40MG DR TAB: 1 tab(s) orally once a day (17 May 2023 14:45)      MEDICATIONS  (STANDING):  aspirin enteric coated 81 milliGRAM(s) Oral daily  atorvastatin 40 milliGRAM(s) Oral at bedtime  cefTRIAXone   IVPB 1000 milliGRAM(s) IV Intermittent every 24 hours  cloNIDine 0.1 milliGRAM(s) Oral two times a day  dextrose 5%. 1000 milliLiter(s) (50 mL/Hr) IV Continuous <Continuous>  dextrose 5%. 1000 milliLiter(s) (100 mL/Hr) IV Continuous <Continuous>  dextrose 50% Injectable 12.5 Gram(s) IV Push once  dextrose 50% Injectable 25 Gram(s) IV Push once  dextrose 50% Injectable 25 Gram(s) IV Push once  diltiazem Infusion 5 mG/Hr (5 mL/Hr) IV Continuous <Continuous>  gabapentin 100 milliGRAM(s) Oral at bedtime  glucagon  Injectable 1 milliGRAM(s) IntraMuscular once  heparin   Injectable 5000 Unit(s) SubCutaneous every 12 hours  imipramine 50 milliGRAM(s) Oral daily  insulin lispro (ADMELOG) corrective regimen sliding scale   SubCutaneous three times a day before meals  melatonin 3 milliGRAM(s) Oral at bedtime  metoprolol tartrate 100 milliGRAM(s) Oral two times a day  multivitamin 1 Tablet(s) Oral daily  pantoprazole    Tablet 40 milliGRAM(s) Oral before breakfast  risperiDONE   Tablet 0.5 milliGRAM(s) Oral at bedtime  senna 2 Tablet(s) Oral at bedtime    MEDICATIONS  (PRN):  acetaminophen     Tablet .. 650 milliGRAM(s) Oral every 6 hours PRN Temp greater or equal to 38C (100.4F), Mild Pain (1 - 3)  aluminum hydroxide/magnesium hydroxide/simethicone Suspension 30 milliLiter(s) Oral every 4 hours PRN Dyspepsia  dextrose Oral Gel 15 Gram(s) Oral once PRN Blood Glucose LESS THAN 70 milliGRAM(s)/deciliter  ondansetron Injectable 4 milliGRAM(s) IV Push every 8 hours PRN Nausea and/or Vomiting      Diet, Renal Restrictions:   For patients receiving Renal Replacement - No Protein Restr, No Conc K, No Conc Phos, Low Sodium (05-17-23 @ 13:46) [Active]          Vital Signs Last 24 Hrs  T(C): 37 (18 May 2023 10:10), Max: 37.2 (18 May 2023 07:45)  T(F): 98.6 (18 May 2023 10:10), Max: 98.9 (18 May 2023 07:45)  HR: 95 (18 May 2023 10:10) (78 - 126)  BP: 126/78 (18 May 2023 10:10) (115/62 - 159/90)  BP(mean): --  RR: 20 (18 May 2023 10:10) (18 - 20)  SpO2: 97% (18 May 2023 10:10) (95% - 99%)    Parameters below as of 18 May 2023 10:10  Patient On (Oxygen Delivery Method): nasal cannula          05-17-23 @ 07:01  -  05-18-23 @ 07:00  --------------------------------------------------------  IN: 30 mL / OUT: 0 mL / NET: 30 mL              LABS:                        10.6   15.50 )-----------( 296      ( 18 May 2023 06:30 )             33.7     05-18    134<L>  |  100  |  39<H>  ----------------------------<  114<H>  5.0   |  26  |  5.40<H>    Ca    9.1      18 May 2023 06:30  Phos  4.8     05-18  Mg     2.2     05-18    TPro  6.4  /  Alb  2.7<L>  /  TBili  0.7  /  DBili  x   /  AST  21  /  ALT  17  /  AlkPhos  104  05-18    PT/INR - ( 18 May 2023 06:30 )   PT: 15.6 sec;   INR: 1.33 ratio         PTT - ( 17 May 2023 09:40 )  PTT:27.9 sec      ABG - ( 18 May 2023 05:00 )  pH, Arterial: 7.39  pH, Blood: x     /  pCO2: 45    /  pO2: 65    / HCO3: 27    / Base Excess: 2.2   /  SaO2: 94.0                WBC:  WBC Count: 15.50 K/uL (05-18 @ 06:30)  WBC Count: 12.18 K/uL (05-17 @ 09:40)      MICROBIOLOGY:  RECENT CULTURES:              PT/INR - ( 18 May 2023 06:30 )   PT: 15.6 sec;   INR: 1.33 ratio         PTT - ( 17 May 2023 09:40 )  PTT:27.9 sec    Sodium:  Sodium, Serum: 134 mmol/L (05-18 @ 06:30)  Sodium, Serum: 133 mmol/L (05-17 @ 09:40)      5.40 mg/dL 05-18 @ 06:30  4.60 mg/dL 05-17 @ 09:40      Hemoglobin:  Hemoglobin: 10.6 g/dL (05-18 @ 06:30)  Hemoglobin: 10.5 g/dL (05-17 @ 09:40)      Platelets: Platelet Count - Automated: 296 K/uL (05-18 @ 06:30)  Platelet Count - Automated: 354 K/uL (05-17 @ 09:40)      LIVER FUNCTIONS - ( 18 May 2023 06:30 )  Alb: 2.7 g/dL / Pro: 6.4 g/dL / ALK PHOS: 104 U/L / ALT: 17 U/L / AST: 21 U/L / GGT: x                 RADIOLOGY & ADDITIONAL STUDIES:      MICROBIOLOGY:  RECENT CULTURES:

## 2023-05-18 NOTE — DIETITIAN INITIAL EVALUATION ADULT - ETIOLOGY
related to increased demand for nutrient (kcal/protein, vit/min) related to decreased ability to consume sufficient energy

## 2023-05-18 NOTE — PROGRESS NOTE ADULT - SUBJECTIVE AND OBJECTIVE BOX
Patient is a 72y Female with a known history of :  Rapid atrial fibrillation [I48.91]    Chronic combined systolic and diastolic heart failure [I50.42]    ESRD on dialysis [N18.6]    MDD (major depressive disorder) [F32.9]    FTT (failure to thrive) in adult [R62.7]    Prophylactic measure [Z29.9]    Viral syndrome [B34.9]    Pneumonia [J18.9]    HTN (hypertension) [I10]      HPI:  73yo female bib ems with cough for 2 days. pt c/o dry cough, no fever, chills no sob, pt states she got dialysis yesterday, +smoking hx no other complaintsRVP positive for enterovirus /Chest [facility] at [facility] showed L lung opacification .In ER found to have rapid afib and seen by cardiologist Admitted  to telemetry unit for monitoring , send 3 sets of cardiac enzymes to rule out acute coronary event, obtain ECHO to evaluate LVEF, cardiology consult  ,continue current management, O2 supply, anticoagulation plan as per cardiology consult  chest CT that showed bilateral effusion more in left with compressive atelectasis vs consolidation. Admit to monitored unit for cardiac monitoring, obtain echo to evaluate LVEF, intravenous diuresis as per card consult , monitor ins/outs, monitor renal profile and electrolytes closely ,send 3 sets of enzymes, O2 supply, serial chest xrays, monitor weights and oral intake of fluids, nutritionist consult .Seen b y pulmonologist and ID consult Procalcitonin 0.9 WBC on admission 12k Tmax 99.1 This could be just viral infection causing cough but since Chest CT has questionable consolidations, will cover for CAP. Also procalcitonin is slightly high due to ESRD. Palliative care consult requested ,to discuss advance directives and complete MOLST  (17 May 2023 14:01)      REVIEW OF SYSTEMS:    CONSTITUTIONAL: No fever, weight loss, or fatigue  EYES: No eye pain, visual disturbances, or discharge  ENMT:  No difficulty hearing, tinnitus, vertigo; No sinus or throat pain  NECK: No pain or stiffness  BREASTS: No pain, masses, or nipple discharge  RESPIRATORY: No cough, wheezing, chills or hemoptysis; No shortness of breath  CARDIOVASCULAR: No chest pain, palpitations, dizziness, or leg swelling  GASTROINTESTINAL: No abdominal or epigastric pain. No nausea, vomiting, or hematemesis; No diarrhea or constipation. No melena or hematochezia.  GENITOURINARY: No dysuria, frequency, hematuria, or incontinence  NEUROLOGICAL: No headaches, memory loss, loss of strength, numbness, or tremors  SKIN: No itching, burning, rashes, or lesions   LYMPH NODES: No enlarged glands  ENDOCRINE: No heat or cold intolerance; No hair loss  MUSCULOSKELETAL: No joint pain or swelling; No muscle, back, or extremity pain  PSYCHIATRIC: No depression, anxiety, mood swings, or difficulty sleeping  HEME/LYMPH: No easy bruising, or bleeding gums  ALLERGY AND IMMUNOLOGIC: No hives or eczema    MEDICATIONS  (STANDING):  aspirin enteric coated 81 milliGRAM(s) Oral daily  atorvastatin 40 milliGRAM(s) Oral at bedtime  cefTRIAXone   IVPB 1000 milliGRAM(s) IV Intermittent every 24 hours  cloNIDine 0.1 milliGRAM(s) Oral two times a day  dextrose 5%. 1000 milliLiter(s) (50 mL/Hr) IV Continuous <Continuous>  dextrose 5%. 1000 milliLiter(s) (100 mL/Hr) IV Continuous <Continuous>  dextrose 50% Injectable 12.5 Gram(s) IV Push once  dextrose 50% Injectable 25 Gram(s) IV Push once  dextrose 50% Injectable 25 Gram(s) IV Push once  diltiazem Infusion 5 mG/Hr (5 mL/Hr) IV Continuous <Continuous>  gabapentin 100 milliGRAM(s) Oral at bedtime  glucagon  Injectable 1 milliGRAM(s) IntraMuscular once  heparin   Injectable 5000 Unit(s) SubCutaneous every 12 hours  imipramine 50 milliGRAM(s) Oral daily  insulin lispro (ADMELOG) corrective regimen sliding scale   SubCutaneous three times a day before meals  melatonin 3 milliGRAM(s) Oral at bedtime  metoprolol tartrate 100 milliGRAM(s) Oral two times a day  multivitamin 1 Tablet(s) Oral daily  pantoprazole    Tablet 40 milliGRAM(s) Oral before breakfast  risperiDONE   Tablet 0.5 milliGRAM(s) Oral at bedtime  senna 2 Tablet(s) Oral at bedtime    MEDICATIONS  (PRN):  acetaminophen     Tablet .. 650 milliGRAM(s) Oral every 6 hours PRN Temp greater or equal to 38C (100.4F), Mild Pain (1 - 3)  aluminum hydroxide/magnesium hydroxide/simethicone Suspension 30 milliLiter(s) Oral every 4 hours PRN Dyspepsia  dextrose Oral Gel 15 Gram(s) Oral once PRN Blood Glucose LESS THAN 70 milliGRAM(s)/deciliter  ondansetron Injectable 4 milliGRAM(s) IV Push every 8 hours PRN Nausea and/or Vomiting      ALLERGIES: No Known Drug Allergies  latex (Hives)      FAMILY HISTORY:  FH: myocardial infarction (Father)    FH: myocardial infarction (Father)    Family history of type 2 diabetes mellitus in brother (Sibling)        PHYSICAL EXAMINATION:  -----------------------------  T(C): 37.2 (05-18-23 @ 07:45), Max: 37.3 (05-17-23 @ 08:55)  HR: 78 (05-18-23 @ 07:45) (78 - 128)  BP: 115/62 (05-18-23 @ 07:45) (115/62 - 159/90)  RR: 18 (05-18-23 @ 07:45) (16 - 20)  SpO2: 96% (05-18-23 @ 07:45) (95% - 99%)  Wt(kg): --    05-17 @ 07:01  -  05-18 @ 07:00  --------------------------------------------------------  IN:    Diltiazem: 30 mL  Total IN: 30 mL    OUT:  Total OUT: 0 mL    Total NET: 30 mL            VITALS  T(C): 37.2 (05-18-23 @ 07:45), Max: 37.3 (05-17-23 @ 08:55)  HR: 78 (05-18-23 @ 07:45) (78 - 128)  BP: 115/62 (05-18-23 @ 07:45) (115/62 - 159/90)  RR: 18 (05-18-23 @ 07:45) (16 - 20)  SpO2: 96% (05-18-23 @ 07:45) (95% - 99%)    Constitutional: well developed, normal appearance, well groomed, well nourished, no deformities and no acute distress.   Eyes: the conjunctiva exhibited no abnormalities and the eyelids demonstrated no xanthelasmas.   HEENT: normal oral mucosa, no oral pallor and no oral cyanosis.   Neck: normal jugular venous A waves present, normal jugular venous V waves present and no jugular venous wilson A waves.   Pulmonary: no respiratory distress, normal respiratory rhythm and effort, no accessory muscle use and lungs were clear to auscultation bilaterally.   Cardiovascular: heart rate and rhythm were normal, normal S1 and S2 and no murmur, gallop, rub, heave or thrill are present.   Abdomen: soft, non-tender, no hepato-splenomegaly and no abdominal mass palpated.   Musculoskeletal: the gait could not be assessed..   Extremities: no clubbing of the fingernails, no localized cyanosis, no petechial hemorrhages and no ischemic changes.   Skin: normal skin color and pigmentation, no rash, no venous stasis, no skin lesions, no skin ulcer and no xanthoma was observed.   Psychiatric: oriented to person, place, and time, the affect was normal, the mood was normal and not feeling anxious.     LABS:   --------  05-18    134<L>  |  100  |  39<H>  ----------------------------<  114<H>  5.0   |  26  |  5.40<H>    Ca    9.1      18 May 2023 06:30    TPro  x   /  Alb  2.7<L>  /  TBili  x   /  DBili  x   /  AST  21  /  ALT  17  /  AlkPhos  x   05-18                         10.6   15.50 )-----------( 296      ( 18 May 2023 06:30 )             33.7     PT/INR - ( 17 May 2023 09:40 )   PT: 14.6 sec;   INR: 1.25 ratio         PTT - ( 17 May 2023 09:40 )  PTT:27.9 sec            RADIOLOGY:  -----------------    ECG:     ECHO:

## 2023-05-18 NOTE — DIETITIAN INITIAL EVALUATION ADULT - ORAL INTAKE PTA/DIET HISTORY
Pt admitted from [facility]. Reviewed transfer documents, pt was on a renal, NCS, regular consistency diet with thin liquids PTA. Was receiving LPS 30mL daily, and Nepro shake 4x/day.  Pt admitted from [facility]. Reviewed transfer documents, pt was on a renal, NCS, regular consistency diet with thin liquids PTA. 1500ml FR. Was receiving LPS 30mL daily, and Nepro shake 4x/day.

## 2023-05-18 NOTE — PATIENT PROFILE ADULT - OVER THE PAST TWO WEEKS, HAVE YOU FELT LITTLE INTEREST OR PLEASURE IN DOING THINGS?
Discussed with Dr Leach   plan is for OR today  Pain control - oxycodone 5mg, will also have Tylenol ATC and dilaudid IV prn  Check Vitamin D level - pending no

## 2023-05-18 NOTE — DIETITIAN INITIAL EVALUATION ADULT - PROBLEM SELECTOR PLAN 8
Admit for iv hydration,monitor renal profile and urine output,nutritionist consult,prealbumin level,serial bmp,nutritional supplements,multivitamins,palliative care evaluuation regarding MOLST completion and artificial nutrition discussion 98.4

## 2023-05-19 LAB
ANION GAP SERPL CALC-SCNC: 2 MMOL/L — LOW (ref 5–17)
BUN SERPL-MCNC: 24 MG/DL — HIGH (ref 7–23)
CALCIUM SERPL-MCNC: 8.9 MG/DL — SIGNIFICANT CHANGE UP (ref 8.5–10.1)
CHLORIDE SERPL-SCNC: 102 MMOL/L — SIGNIFICANT CHANGE UP (ref 96–108)
CO2 SERPL-SCNC: 29 MMOL/L — SIGNIFICANT CHANGE UP (ref 22–31)
CREAT SERPL-MCNC: 3.9 MG/DL — HIGH (ref 0.5–1.3)
EGFR: 12 ML/MIN/1.73M2 — LOW
GLUCOSE SERPL-MCNC: 170 MG/DL — HIGH (ref 70–99)
HCT VFR BLD CALC: 30.6 % — LOW (ref 34.5–45)
HGB BLD-MCNC: 9.7 G/DL — LOW (ref 11.5–15.5)
LDH SERPL L TO P-CCNC: 268 U/L — HIGH (ref 50–242)
MCHC RBC-ENTMCNC: 30.5 PG — SIGNIFICANT CHANGE UP (ref 27–34)
MCHC RBC-ENTMCNC: 31.7 GM/DL — LOW (ref 32–36)
MCV RBC AUTO: 96.2 FL — SIGNIFICANT CHANGE UP (ref 80–100)
NIGHT BLUE STAIN TISS: SIGNIFICANT CHANGE UP
NRBC # BLD: 0 /100 WBCS — SIGNIFICANT CHANGE UP (ref 0–0)
PLATELET # BLD AUTO: 299 K/UL — SIGNIFICANT CHANGE UP (ref 150–400)
POTASSIUM SERPL-MCNC: 3.5 MMOL/L — SIGNIFICANT CHANGE UP (ref 3.5–5.3)
POTASSIUM SERPL-SCNC: 3.5 MMOL/L — SIGNIFICANT CHANGE UP (ref 3.5–5.3)
RBC # BLD: 3.18 M/UL — LOW (ref 3.8–5.2)
RBC # FLD: 15.4 % — HIGH (ref 10.3–14.5)
SODIUM SERPL-SCNC: 133 MMOL/L — LOW (ref 135–145)
SPECIMEN SOURCE: SIGNIFICANT CHANGE UP
WBC # BLD: 12.09 K/UL — HIGH (ref 3.8–10.5)
WBC # FLD AUTO: 12.09 K/UL — HIGH (ref 3.8–10.5)

## 2023-05-19 PROCEDURE — 99233 SBSQ HOSP IP/OBS HIGH 50: CPT

## 2023-05-19 PROCEDURE — 99232 SBSQ HOSP IP/OBS MODERATE 35: CPT

## 2023-05-19 RX ORDER — DILTIAZEM HCL 120 MG
60 CAPSULE, EXT RELEASE 24 HR ORAL THREE TIMES A DAY
Refills: 0 | Status: DISCONTINUED | OUTPATIENT
Start: 2023-05-19 | End: 2023-05-24

## 2023-05-19 RX ORDER — APIXABAN 2.5 MG/1
2.5 TABLET, FILM COATED ORAL
Refills: 0 | Status: DISCONTINUED | OUTPATIENT
Start: 2023-05-19 | End: 2023-05-24

## 2023-05-19 RX ADMIN — Medication 1: at 18:13

## 2023-05-19 RX ADMIN — Medication 2: at 08:12

## 2023-05-19 RX ADMIN — Medication 1 TABLET(S): at 11:22

## 2023-05-19 RX ADMIN — HEPARIN SODIUM 5000 UNIT(S): 5000 INJECTION INTRAVENOUS; SUBCUTANEOUS at 06:01

## 2023-05-19 RX ADMIN — Medication 100 MILLIGRAM(S): at 17:33

## 2023-05-19 RX ADMIN — Medication 81 MILLIGRAM(S): at 11:22

## 2023-05-19 RX ADMIN — Medication 60 MILLIGRAM(S): at 21:25

## 2023-05-19 RX ADMIN — APIXABAN 2.5 MILLIGRAM(S): 2.5 TABLET, FILM COATED ORAL at 17:32

## 2023-05-19 RX ADMIN — Medication 2.5 MILLIGRAM(S): at 06:01

## 2023-05-19 RX ADMIN — PANTOPRAZOLE SODIUM 40 MILLIGRAM(S): 20 TABLET, DELAYED RELEASE ORAL at 06:01

## 2023-05-19 RX ADMIN — Medication 0.1 MILLIGRAM(S): at 17:32

## 2023-05-19 RX ADMIN — MIRTAZAPINE 7.5 MILLIGRAM(S): 45 TABLET, ORALLY DISINTEGRATING ORAL at 21:25

## 2023-05-19 RX ADMIN — Medication 0.1 MILLIGRAM(S): at 05:15

## 2023-05-19 RX ADMIN — CEFTRIAXONE 100 MILLIGRAM(S): 500 INJECTION, POWDER, FOR SOLUTION INTRAMUSCULAR; INTRAVENOUS at 21:26

## 2023-05-19 RX ADMIN — Medication 1: at 12:29

## 2023-05-19 RX ADMIN — SENNA PLUS 2 TABLET(S): 8.6 TABLET ORAL at 21:25

## 2023-05-19 RX ADMIN — Medication 50 MILLIGRAM(S): at 11:22

## 2023-05-19 RX ADMIN — Medication 100 MILLIGRAM(S): at 05:16

## 2023-05-19 RX ADMIN — Medication 2.5 MILLIGRAM(S): at 17:32

## 2023-05-19 NOTE — PROGRESS NOTE ADULT - SUBJECTIVE AND OBJECTIVE BOX
CHIEF COMPLAINT/ REASON FOR VISIT  .. Patient was seen to address the  issue listed under PROBLEM LIST which is located toward bottom of this note     SHILO KOHLER    PLV TELE 309 D1    Allergies    No Known Drug Allergies  latex (Hives)    Intolerances        PAST MEDICAL & SURGICAL HISTORY:  HTN (hypertension)      h/o Anxiety attack      Depression      h/o Myocardial infarct 2007      h/o Hepatitis A 1969  currently resolved, no symptoms      Murmur, cardiac      h/o Smoking  quitted 3/2012      Anemia secondary to renal failure      ESRD on dialysis      Falls      Ataxia      Type 2 diabetes mellitus      Peripheral vascular disease, unspecified      CAD (coronary artery disease)      Diabetes      coronary stent 2007      s/p Ovarian cyst removal      s/p surgical removal of benign Skin lesion epigastric area      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          FAMILY HISTORY:  FH: myocardial infarction (Father)    FH: myocardial infarction (Father)    Family history of type 2 diabetes mellitus in brother (Sibling)        Home Medications:  acetaminophen 325 mg oral tablet: 2 tab(s) orally every 6 hours, As needed, Temp greater or equal to 38C (100.4F), Mild Pain (1 - 3) (17 May 2023 14:45)  Admelog SoloStar 100 units/mL injectable solution: injectable 3 times a day (before meals)sliding scale  (17 May 2023 14:45)  aspirin 81 mg oral delayed release tablet: 1 tab(s) orally once a day (at bedtime) (17 May 2023 14:45)  atorvastatin 40 mg oral tablet: 1 tab(s) orally once a day (at bedtime) (17 May 2023 14:45)  Basaglar KwikPen 100 units/mL subcutaneous solution: 18 unit(s) subcutaneous once a day (at bedtime) (17 May 2023 14:42)  cloNIDine 0.1 mg oral tablet: 1 tab(s) orally 2 times a day (17 May 2023 14:45)  gabapentin 100 mg oral capsule: 1 cap(s) orally once a day (at bedtime) (17 May 2023 14:41)  imipramine 50 mg oral tablet: 1 tab(s) orally once a day (17 May 2023 14:45)  Lokelma 10 g oral powder for reconstitution: 10 gram(s) orally 2 times a week on Wed and Sat  (17 May 2023 14:45)  metoprolol tartrate 100 mg oral tablet: 1 tab(s) orally once a day (at bedtime) (17 May 2023 14:45)  Mucinex 600 mg oral tablet, extended release: 1 tab(s) orally 2 times a day (17 May 2023 14:43)  Nephro-Alfie oral tablet: 1 tab(s) orally once a day (17 May 2023 14:45)  PANTOPRAZOLE 40MG DR TAB: 1 tab(s) orally once a day (17 May 2023 14:45)      MEDICATIONS  (STANDING):  aspirin enteric coated 81 milliGRAM(s) Oral daily  atorvastatin 40 milliGRAM(s) Oral at bedtime  cefTRIAXone   IVPB 1000 milliGRAM(s) IV Intermittent every 24 hours  cloNIDine 0.1 milliGRAM(s) Oral two times a day  dextrose 5%. 1000 milliLiter(s) (50 mL/Hr) IV Continuous <Continuous>  dextrose 5%. 1000 milliLiter(s) (100 mL/Hr) IV Continuous <Continuous>  dextrose 50% Injectable 12.5 Gram(s) IV Push once  dextrose 50% Injectable 25 Gram(s) IV Push once  dextrose 50% Injectable 25 Gram(s) IV Push once  diltiazem Infusion 5 mG/Hr (5 mL/Hr) IV Continuous <Continuous>  dronabinol 2.5 milliGRAM(s) Oral two times a day before meals  gabapentin 100 milliGRAM(s) Oral at bedtime  glucagon  Injectable 1 milliGRAM(s) IntraMuscular once  heparin   Injectable 5000 Unit(s) SubCutaneous every 12 hours  imipramine 50 milliGRAM(s) Oral daily  insulin lispro (ADMELOG) corrective regimen sliding scale   SubCutaneous three times a day before meals  melatonin 3 milliGRAM(s) Oral at bedtime  metoprolol tartrate 100 milliGRAM(s) Oral two times a day  mirtazapine 7.5 milliGRAM(s) Oral at bedtime  multivitamin 1 Tablet(s) Oral daily  pantoprazole    Tablet 40 milliGRAM(s) Oral before breakfast  risperiDONE   Tablet 0.5 milliGRAM(s) Oral at bedtime  senna 2 Tablet(s) Oral at bedtime    MEDICATIONS  (PRN):  acetaminophen     Tablet .. 650 milliGRAM(s) Oral every 6 hours PRN Temp greater or equal to 38C (100.4F), Mild Pain (1 - 3)  aluminum hydroxide/magnesium hydroxide/simethicone Suspension 30 milliLiter(s) Oral every 4 hours PRN Dyspepsia  dextrose Oral Gel 15 Gram(s) Oral once PRN Blood Glucose LESS THAN 70 milliGRAM(s)/deciliter  ondansetron Injectable 4 milliGRAM(s) IV Push every 8 hours PRN Nausea and/or Vomiting      Diet, Consistent Carbohydrate w/Evening Snack:   For patients receiving Renal Replacement - No Protein Restr, No Conc K, No Conc Phos, Low Sodium (RENAL)  Supplement Feeding Modality:  Oral  Nepro Cans or Servings Per Day:  1       Frequency:  Three Times a day (05-18-23 @ 11:26) [Active]          Vital Signs Last 24 Hrs  T(C): 36.8 (19 May 2023 04:37), Max: 37 (18 May 2023 10:10)  T(F): 98.3 (19 May 2023 04:37), Max: 98.6 (18 May 2023 10:10)  HR: 82 (19 May 2023 04:37) (76 - 98)  BP: 146/74 (19 May 2023 04:37) (98/57 - 149/77)  BP(mean): --  RR: 18 (19 May 2023 04:37) (18 - 24)  SpO2: 98% (19 May 2023 04:37) (96% - 100%)    Parameters below as of 19 May 2023 04:37  Patient On (Oxygen Delivery Method): nasal cannula  O2 Flow (L/min): 3        05-18-23 @ 07:01  -  05-19-23 @ 07:00  --------------------------------------------------------  IN: 0 mL / OUT: 2810 mL / NET: -2810 mL              LABS:                        10.6   15.50 )-----------( 296      ( 18 May 2023 06:30 )             33.7     05-18    134<L>  |  100  |  39<H>  ----------------------------<  114<H>  5.0   |  26  |  5.40<H>    Ca    9.1      18 May 2023 06:30  Phos  4.8     05-18  Mg     2.2     05-18    TPro  6.4  /  Alb  2.7<L>  /  TBili  0.7  /  DBili  x   /  AST  21  /  ALT  17  /  AlkPhos  104  05-18    PT/INR - ( 18 May 2023 06:30 )   PT: 15.6 sec;   INR: 1.33 ratio         PTT - ( 17 May 2023 09:40 )  PTT:27.9 sec      ABG - ( 18 May 2023 05:00 )  pH, Arterial: 7.39  pH, Blood: x     /  pCO2: 45    /  pO2: 65    / HCO3: 27    / Base Excess: 2.2   /  SaO2: 94.0                WBC:  WBC Count: 15.50 K/uL (05-18 @ 06:30)  WBC Count: 12.18 K/uL (05-17 @ 09:40)      MICROBIOLOGY:  RECENT CULTURES:  05-18 Pleural Fl Pleural Fluid XXXX   polymorphonuclear leukocytes seen  No organisms seen  by cytocentrifuge   Testing in progress    05-17 .Blood Blood-Peripheral XXXX XXXX   No growth to date.    05-17 .Blood Blood-Peripheral XXXX XXXX   No growth to date.                PT/INR - ( 18 May 2023 06:30 )   PT: 15.6 sec;   INR: 1.33 ratio         PTT - ( 17 May 2023 09:40 )  PTT:27.9 sec    Sodium:  Sodium, Serum: 134 mmol/L (05-18 @ 06:30)  Sodium, Serum: 133 mmol/L (05-17 @ 09:40)      5.40 mg/dL 05-18 @ 06:30  4.60 mg/dL 05-17 @ 09:40      Hemoglobin:  Hemoglobin: 10.6 g/dL (05-18 @ 06:30)  Hemoglobin: 10.5 g/dL (05-17 @ 09:40)      Platelets: Platelet Count - Automated: 296 K/uL (05-18 @ 06:30)  Platelet Count - Automated: 354 K/uL (05-17 @ 09:40)      LIVER FUNCTIONS - ( 18 May 2023 06:30 )  Alb: 2.7 g/dL / Pro: 6.4 g/dL / ALK PHOS: 104 U/L / ALT: 17 U/L / AST: 21 U/L / GGT: x                 RADIOLOGY & ADDITIONAL STUDIES:      MICROBIOLOGY:  RECENT CULTURES:  05-18 Pleural Fl Pleural Fluid XXXX   polymorphonuclear leukocytes seen  No organisms seen  by cytocentrifuge   Testing in progress    05-17 .Blood Blood-Peripheral XXXX XXXX   No growth to date.    05-17 .Blood Blood-Peripheral XXXX XXXX   No growth to date.

## 2023-05-19 NOTE — PROVIDER CONTACT NOTE (OTHER) - SITUATION
pt lethargic, responsive to only painful stimuli and sternal rub. Pt is due for Cardizem at this time but is sustaining HR 60-63.

## 2023-05-19 NOTE — SWALLOW BEDSIDE ASSESSMENT ADULT - ADDITIONAL RECOMMENDATIONS
1. NPO with short-term vs. long term non-oral means of nutrition/hydration per pt/family wishes   2. MD to reconsult when becomes medically optimized for oral diet program and is able/ready to participate in assessment of swallow function.   3. Aspiration precautions-if any s/s penetration/aspiration noted d/c PO & initiate NPO w/ SLP to re-assess  3. RD to ensure adequate caloric intake 1. NPO with short-term vs. long term non-oral means of nutrition/hydration per pt/family wishes   2. MD to reconsult when becomes medically optimized for oral diet program and is able/ready to participate in assessment of swallow function.   3. Aspiration precautions-if any s/s penetration/aspiration noted d/c PO & initiate NPO w/ SLP to re-assess  4. RD to ensure adequate caloric intake

## 2023-05-19 NOTE — PROGRESS NOTE ADULT - SUBJECTIVE AND OBJECTIVE BOX
PROGRESS NOTE  Patient is a 72y old  Female who presents with a chief complaint of ABNORMAL XRAY OF CHEST (19 May 2023 12:01)  Chart and available morning labs /imaging are reviewed electronically , urgent issues addressed . More information  is being added upon completion of rounds , when more information is collected and management discussed with consultants , medical staff and social service/case management on the floor   OVERNIGHT  HR in 60s  reported by medical staff ,converted to SR , cardizem drip stopped by Dr James  . All above noted Patient is resting in a bed comfortably .Confused ,poor mentation .No distress noted   Increased lethargy reported later , Remeron was added by psychiatrist yesterday ,will d/c risperidone ,melatonin and gabapentin qhs Neurology eval requested   HPI:  73yo female bib ems with cough for 2 days. pt c/o dry cough, no fever, chills no sob, pt states she got dialysis yesterday, +smoking hx no other complaintsRVP positive for enterovirus /Chest xray at [facility] showed L lung opacification .In ER found to have rapid afib and seen by cardiologist Admitted  to telemetry unit for monitoring , send 3 sets of cardiac enzymes to rule out acute coronary event, obtain ECHO to evaluate LVEF, cardiology consult  ,continue current management, O2 supply, anticoagulation plan as per cardiology consult  chest CT that showed bilateral effusion more in left with compressive atelectasis vs consolidation. Admit to monitored unit for cardiac monitoring, obtain echo to evaluate LVEF, intravenous diuresis as per card consult , monitor ins/outs, monitor renal profile and electrolytes closely ,send 3 sets of enzymes, O2 supply, serial chest xrays, monitor weights and oral intake of fluids, nutritionist consult .Seen b y pulmonologist and ID consult Procalcitonin 0.9 WBC on admission 12k Tmax 99.1 This could be just viral infection causing cough but since Chest CT has questionable consolidations, will cover for CAP. Also procalcitonin is slightly high due to ESRD. Palliative care consult requested ,to discuss advance directives and complete MOLST  (17 May 2023 14:01)    PAST MEDICAL & SURGICAL HISTORY:  HTN (hypertension)      h/o Anxiety attack      Depression      h/o Myocardial infarct 2007      h/o Hepatitis A 1969  currently resolved, no symptoms      Murmur, cardiac      h/o Smoking  quitted 3/2012      Anemia secondary to renal failure      ESRD on dialysis      Falls      Ataxia      Type 2 diabetes mellitus      Peripheral vascular disease, unspecified      CAD (coronary artery disease)      Diabetes      coronary stent 2007      s/p Ovarian cyst removal      s/p surgical removal of benign Skin lesion epigastric area      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          MEDICATIONS  (STANDING):  apixaban 2.5 milliGRAM(s) Oral two times a day  aspirin enteric coated 81 milliGRAM(s) Oral daily  cefTRIAXone   IVPB 1000 milliGRAM(s) IV Intermittent every 24 hours  cloNIDine 0.1 milliGRAM(s) Oral two times a day  dextrose 5%. 1000 milliLiter(s) (100 mL/Hr) IV Continuous <Continuous>  dextrose 5%. 1000 milliLiter(s) (50 mL/Hr) IV Continuous <Continuous>  dextrose 50% Injectable 12.5 Gram(s) IV Push once  dextrose 50% Injectable 25 Gram(s) IV Push once  dextrose 50% Injectable 25 Gram(s) IV Push once  diltiazem    Tablet 60 milliGRAM(s) Oral three times a day  dronabinol 2.5 milliGRAM(s) Oral two times a day before meals  glucagon  Injectable 1 milliGRAM(s) IntraMuscular once  imipramine 50 milliGRAM(s) Oral daily  insulin lispro (ADMELOG) corrective regimen sliding scale   SubCutaneous three times a day before meals  metoprolol tartrate 100 milliGRAM(s) Oral two times a day  mirtazapine 7.5 milliGRAM(s) Oral at bedtime  multivitamin 1 Tablet(s) Oral daily  pantoprazole    Tablet 40 milliGRAM(s) Oral before breakfast  senna 2 Tablet(s) Oral at bedtime    MEDICATIONS  (PRN):  acetaminophen     Tablet .. 650 milliGRAM(s) Oral every 6 hours PRN Temp greater or equal to 38C (100.4F), Mild Pain (1 - 3)  aluminum hydroxide/magnesium hydroxide/simethicone Suspension 30 milliLiter(s) Oral every 4 hours PRN Dyspepsia  dextrose Oral Gel 15 Gram(s) Oral once PRN Blood Glucose LESS THAN 70 milliGRAM(s)/deciliter  ondansetron Injectable 4 milliGRAM(s) IV Push every 8 hours PRN Nausea and/or Vomiting      OBJECTIVE    T(C): 36.3 (05-19-23 @ 13:30), Max: 36.8 (05-18-23 @ 18:30)  HR: 62 (05-19-23 @ 13:30) (62 - 87)  BP: 143/74 (05-19-23 @ 13:30) (98/57 - 159/85)  RR: 18 (05-19-23 @ 13:30) (18 - 20)  SpO2: 99% (05-19-23 @ 13:30) (98% - 99%)  Wt(kg): --  I&O's Summary    18 May 2023 07:01  -  19 May 2023 07:00  --------------------------------------------------------  IN: 0 mL / OUT: 2810 mL / NET: -2810 mL          REVIEW OF SYSTEMS:  Patient is  unable to provide any information/ROS  due to baseline mental status.   PHYSICAL EXAM:  Appearance: NAD. VS past 24 hrs -as above   HEENT:   Moist oral mucosa. Conjunctiva clear b/l.   Neck : supple  Respiratory: Lungs CTAB.  Gastrointestinal:  Soft, nontender. No rebound. No rigidity. BS present	  Cardiovascular: RRR ,S1S2 present  Neurologic: Non-focal. Moving all extremities.  Extremities: No edema. No erythema. No calf tenderness.  Skin: No rashes, No ecchymoses, No cyanosis.	  wounds ,skin lesions-See skin assesment flow sheet   LABS:                        9.7    12.09 )-----------( 299      ( 19 May 2023 12:12 )             30.6     05-19    133<L>  |  102  |  24<H>  ----------------------------<  170<H>  3.5   |  29  |  3.90<H>    Ca    8.9      19 May 2023 12:12  Phos  4.8     05-18  Mg     2.2     05-18    TPro  6.4  /  Alb  2.7<L>  /  TBili  0.7  /  DBili  x   /  AST  21  /  ALT  17  /  AlkPhos  104  05-18    CAPILLARY BLOOD GLUCOSE      POCT Blood Glucose.: 163 mg/dL (19 May 2023 12:18)  POCT Blood Glucose.: 211 mg/dL (19 May 2023 08:04)  POCT Blood Glucose.: 187 mg/dL (18 May 2023 22:15)  POCT Blood Glucose.: 102 mg/dL (18 May 2023 17:13)    PT/INR - ( 18 May 2023 06:30 )   PT: 15.6 sec;   INR: 1.33 ratio               Culture - Fungal, Body Fluid (collected 18 May 2023 13:10)  Source: Pleural Fl Pleural Fluid  Preliminary Report (19 May 2023 07:39):    Testing in progress    Culture - Body Fluid with Gram Stain (collected 18 May 2023 13:10)  Source: Pleural Fl Pleural Fluid  Gram Stain (18 May 2023 22:17):    polymorphonuclear leukocytes seen    No organisms seen    by cytocentrifuge    Culture - Blood (collected 17 May 2023 09:42)  Source: .Blood Blood-Peripheral  Preliminary Report (18 May 2023 18:02):    No growth to date.    Culture - Blood (collected 17 May 2023 09:40)  Source: .Blood Blood-Peripheral  Preliminary Report (18 May 2023 18:02):    No growth to date.      RADIOLOGY & ADDITIONAL TESTS:   reviewed elctronically  ASSESSMENT/PLAN: 	    25 minutes aggregate time was spent on this visit, 50% visit time spent in care co-ordination with other attendings and counselling patient .I have discussed care plan with patient / HCP/family member ,who expressed understanding of problems treatment and their effect and side effects, alternatives in details. I have asked if they have any questions and concerns and appropriately addressed them to best of my ability.

## 2023-05-19 NOTE — PROGRESS NOTE ADULT - SUBJECTIVE AND OBJECTIVE BOX
Patient is a 72y Female with a known history of :  Rapid atrial fibrillation [I48.91]    Chronic combined systolic and diastolic heart failure [I50.42]    ESRD on dialysis [N18.6]    MDD (major depressive disorder) [F32.9]    FTT (failure to thrive) in adult [R62.7]    Prophylactic measure [Z29.9]    Viral syndrome [B34.9]    Pneumonia [J18.9]    HTN (hypertension) [I10]    Pleural effusion, left [J90]      HPI:  71yo female bib ems with cough for 2 days. pt c/o dry cough, no fever, chills no sob, pt states she got dialysis yesterday, +smoking hx no other complaintsRVP positive for enterovirus /C[facility] at [facility] showed L lung opacification .In ER found to have rapid afib and seen by cardiologist Admitted  to telemetry unit for monitoring , send 3 sets of cardiac enzymes to rule out acute coronary event, obtain ECHO to evaluate LVEF, cardiology consult  ,continue current management, O2 supply, anticoagulation plan as per cardiology consult  chest CT that showed bilateral effusion more in left with compressive atelectasis vs consolidation. Admit to monitored unit for cardiac monitoring, obtain echo to evaluate LVEF, intravenous diuresis as per card consult , monitor ins/outs, monitor renal profile and electrolytes closely ,send 3 sets of enzymes, O2 supply, serial chest xrays, monitor weights and oral intake of fluids, nutritionist consult .Seen b y pulmonologist and ID consult Procalcitonin 0.9 WBC on admission 12k Tmax 99.1 This could be just viral infection causing cough but since Chest CT has questionable consolidations, will cover for CAP. Also procalcitonin is slightly high due to ESRD. Palliative care consult requested ,to discuss advance directives and complete MOLST  (17 May 2023 14:01)      REVIEW OF SYSTEMS:    CONSTITUTIONAL: No fever, weight loss, or fatigue  EYES: No eye pain, visual disturbances, or discharge  ENMT:  No difficulty hearing, tinnitus, vertigo; No sinus or throat pain  NECK: No pain or stiffness  BREASTS: No pain, masses, or nipple discharge  RESPIRATORY: No cough, wheezing, chills or hemoptysis; No shortness of breath  CARDIOVASCULAR: No chest pain, palpitations, dizziness, or leg swelling  GASTROINTESTINAL: No abdominal or epigastric pain. No nausea, vomiting, or hematemesis; No diarrhea or constipation. No melena or hematochezia.  GENITOURINARY: No dysuria, frequency, hematuria, or incontinence  NEUROLOGICAL: No headaches, memory loss, loss of strength, numbness, or tremors  SKIN: No itching, burning, rashes, or lesions   LYMPH NODES: No enlarged glands  ENDOCRINE: No heat or cold intolerance; No hair loss  MUSCULOSKELETAL: No joint pain or swelling; No muscle, back, or extremity pain  PSYCHIATRIC: No depression, anxiety, mood swings, or difficulty sleeping  HEME/LYMPH: No easy bruising, or bleeding gums  ALLERGY AND IMMUNOLOGIC: No hives or eczema    MEDICATIONS  (STANDING):  apixaban 2.5 milliGRAM(s) Oral two times a day  aspirin enteric coated 81 milliGRAM(s) Oral daily  atorvastatin 40 milliGRAM(s) Oral at bedtime  cefTRIAXone   IVPB 1000 milliGRAM(s) IV Intermittent every 24 hours  cloNIDine 0.1 milliGRAM(s) Oral two times a day  dextrose 5%. 1000 milliLiter(s) (50 mL/Hr) IV Continuous <Continuous>  dextrose 5%. 1000 milliLiter(s) (100 mL/Hr) IV Continuous <Continuous>  dextrose 50% Injectable 12.5 Gram(s) IV Push once  dextrose 50% Injectable 25 Gram(s) IV Push once  dextrose 50% Injectable 25 Gram(s) IV Push once  diltiazem    Tablet 60 milliGRAM(s) Oral three times a day  dronabinol 2.5 milliGRAM(s) Oral two times a day before meals  gabapentin 100 milliGRAM(s) Oral at bedtime  glucagon  Injectable 1 milliGRAM(s) IntraMuscular once  imipramine 50 milliGRAM(s) Oral daily  insulin lispro (ADMELOG) corrective regimen sliding scale   SubCutaneous three times a day before meals  melatonin 3 milliGRAM(s) Oral at bedtime  metoprolol tartrate 100 milliGRAM(s) Oral two times a day  mirtazapine 7.5 milliGRAM(s) Oral at bedtime  multivitamin 1 Tablet(s) Oral daily  pantoprazole    Tablet 40 milliGRAM(s) Oral before breakfast  risperiDONE   Tablet 0.5 milliGRAM(s) Oral at bedtime  senna 2 Tablet(s) Oral at bedtime    MEDICATIONS  (PRN):  acetaminophen     Tablet .. 650 milliGRAM(s) Oral every 6 hours PRN Temp greater or equal to 38C (100.4F), Mild Pain (1 - 3)  aluminum hydroxide/magnesium hydroxide/simethicone Suspension 30 milliLiter(s) Oral every 4 hours PRN Dyspepsia  dextrose Oral Gel 15 Gram(s) Oral once PRN Blood Glucose LESS THAN 70 milliGRAM(s)/deciliter  ondansetron Injectable 4 milliGRAM(s) IV Push every 8 hours PRN Nausea and/or Vomiting      ALLERGIES: No Known Drug Allergies  latex (Hives)      FAMILY HISTORY:  FH: myocardial infarction (Father)    FH: myocardial infarction (Father)    Family history of type 2 diabetes mellitus in brother (Sibling)        PHYSICAL EXAMINATION:  -----------------------------  T(C): 36.8 (05-19-23 @ 04:37), Max: 37 (05-18-23 @ 10:10)  HR: 82 (05-19-23 @ 04:37) (76 - 98)  BP: 146/74 (05-19-23 @ 04:37) (98/57 - 149/77)  RR: 18 (05-19-23 @ 04:37) (18 - 24)  SpO2: 98% (05-19-23 @ 04:37) (96% - 100%)  Wt(kg): --    05-18 @ 07:01  -  05-19 @ 07:00  --------------------------------------------------------  IN:  Total IN: 0 mL    OUT:    Other (mL): 1310 mL    Other (mL): 1500 mL  Total OUT: 2810 mL    Total NET: -2810 mL            VITALS  T(C): 36.8 (05-19-23 @ 04:37), Max: 37 (05-18-23 @ 10:10)  HR: 82 (05-19-23 @ 04:37) (76 - 98)  BP: 146/74 (05-19-23 @ 04:37) (98/57 - 149/77)  RR: 18 (05-19-23 @ 04:37) (18 - 24)  SpO2: 98% (05-19-23 @ 04:37) (96% - 100%)    Constitutional: well developed, normal appearance, well groomed, well nourished, no deformities and no acute distress.   Eyes: the conjunctiva exhibited no abnormalities and the eyelids demonstrated no xanthelasmas.   HEENT: normal oral mucosa, no oral pallor and no oral cyanosis.   Neck: normal jugular venous A waves present, normal jugular venous V waves present and no jugular venous wilson A waves.   Pulmonary: no respiratory distress, normal respiratory rhythm and effort, no accessory muscle use and lungs were clear to auscultation bilaterally.   Cardiovascular: heart rate and rhythm were normal, normal S1 and S2 and no murmur, gallop, rub, heave or thrill are present.   Abdomen: soft, non-tender, no hepato-splenomegaly and no abdominal mass palpated.   Musculoskeletal: the gait could not be assessed..   Extremities: no clubbing of the fingernails, no localized cyanosis, no petechial hemorrhages and no ischemic changes.   Skin: normal skin color and pigmentation, no rash, no venous stasis, no skin lesions, no skin ulcer and no xanthoma was observed.   Psychiatric: oriented to person, place, and time, the affect was normal, the mood was normal and not feeling anxious.     LABS:   --------  05-18    134<L>  |  100  |  39<H>  ----------------------------<  114<H>  5.0   |  26  |  5.40<H>    Ca    9.1      18 May 2023 06:30  Phos  4.8     05-18  Mg     2.2     05-18    TPro  6.4  /  Alb  2.7<L>  /  TBili  0.7  /  DBili  x   /  AST  21  /  ALT  17  /  AlkPhos  104  05-18                         10.6   15.50 )-----------( 296      ( 18 May 2023 06:30 )             33.7     PT/INR - ( 18 May 2023 06:30 )   PT: 15.6 sec;   INR: 1.33 ratio         PTT - ( 17 May 2023 09:40 )  PTT:27.9 sec        Culture Results:   Testing in progress (05-18 @ 13:10)  Culture Results:   No growth to date. (05-17 @ 09:42)  Culture Results:   No growth to date. (05-17 @ 09:40)      RADIOLOGY:  -----------------    ECG:     ECHO:

## 2023-05-19 NOTE — PROGRESS NOTE ADULT - ASSESSMENT
73yo female bib ems with cough for 2 days. pt c/o dry cough, no fever, chills no sob, pt states she got dialysis yesterday, +smoking hx no other complaintsRVP positive for enterovirus /C[facility] at [facility] showed L lung opacification .In ER found to have rapid afib and seen by cardiologist Admitted  to telemetry unit for monitoring , send 3 sets of cardiac enzymes to rule out acute coronary event, obtain ECHO to evaluate LVEF, cardiology consult  ,continue current management, O2 supply, anticoagulation plan as per cardiology consult  chest CT that showed bilateral effusion more in left with compressive atelectasis vs consolidation. Admit to monitored unit for cardiac monitoring, obtain echo to evaluate LVEF, intravenous diuresis as per card consult , monitor ins/outs, monitor renal profile and electrolytes closely ,send 3 sets of enzymes, O2 supply, serial chest xrays, monitor weights and oral intake of fluids, nutritionist consult .Seen b y pulmonologist and ID consult Procalcitonin 0.9 WBC on admission 12k Tmax 99.1 This could be just viral infection causing cough but since Chest CT has questionable consolidations, will cover for CAP. Also procalcitonin is slightly high due to ESRD. Palliative care consult requested ,to discuss advance directives and complete MOLST  (17 May 2023 14:01)        esrd on hd   Excess fluids and waste products will be removed from your blood; your electrolytes will be balanced; your blood pressure will be controlled.      ANEMIA PLAN:  Anemia of chronic disease:  Well controlled by Aranesp  H and H subtherapeutic .  We will continue Aranesp aiming for a HCT of 32-36 %.   We will monitor Iron stores, B12 and RBC folate .      BP monitoring,continue current antihypertensive meds, low salt diet,followup with PMD in 1-2 weeks

## 2023-05-19 NOTE — PROGRESS NOTE ADULT - ASSESSMENT
REASON FOR VISIT  .. Management of problems listed below      NOTEWORTHY FINDINGS.     PHYSICAL EXAM    HEENT Unremarkable  atraumatic   RESP Fair air entry  Harsh breath sound   CARDIAC S1 S2 No S3     NO JVD    ABDOMEN No hepatosplenomegaly   PEDAL EDEMA present No calf tenderness  NO rash       BEST PRACTICE ISSUES.    HOB ELEVATN.   .. Yes  DVT PPLX.   .. 5/19/2023 apixaba 2.5 x 2 (af)   ..    5/17-5/19/2023 HPSC   ELDER PPLX.   ..    5/17/2023 PROTONIX 40   INFN PPLX.   ..    SP SW AMINAH.    ..  5/19/2023 aminah npo       DIET.    ..  5/17/2023 RENAL   IV fl.  ..      PROCEDURES.  .. 5/18/2023 l thoracentesis 1.5l removd sharan colored   PAST PROCEDURES.    ..     GENERAL DATA .   GOC.    ..    5/19/2023 dnr   ALLGY.  ..    latex                     WT.  ..  5/17/2023 59  BMI.  ..   5/17/2023 19                 ICU STAY.   .. none    DRIPS.  .. CARD 5/- 5/19     ABGS.    VS/ PO/IO/ VENT/ DRIPS.   5/19/2023 afeb 62 140/70   5/19/2023 2l 98%     PROBLEM/ASSESSMENT/PLAN.  PULM.  . GAS EXCHANGE.  .. Target po 90-95%   . PLEURAL EFFUSION.   . 5/18/2023 s prot 6.4   .. cxr cw 3/7/2023 new sm to mod l effsn   .. ct ch nc 5/17/2023 ct ch  .... Sm to mod r effs  .... mod l pl effs with atelectasis consoliatn l lo lobe   .... ground glass and airspace consolidn post segment chaz   .. 5/17/2023 Pl effsn may be sec to esrd chf or parapneumonia   .. 5/17/2023 Thoracentesis ordered   . PLEURAL FLUID ANALYSIS.  .. 5/18/2023 l thorac 1.5 l   ..5/18 pl fl l 13 m 73 p 5 afb sm (-) g 131 l 102/268 (.38) ph 7.6 pr 2.6/6.4 (.4)  .. Fluid transudate    INFECTION.  . ENTERORHINOVIRUS INFECTION RESP TRACT .  . PNEUMONIA.    .. W 5/17-5/18-5/19/2023 w 12.1-15.5 - 12   .. pr 5/17/2023 pr .9   .. rvp 5/17/2023 enterorhinovirus   .. 5/17/2023 ROCEPHIN x 5d Dr CHEEK   .. Manage viral infection with supp care   .. Empiric antibio started by id to cover superimposed bact pneumonia cap or aspiration in setting of esrd    CARDIAC.  .. HYTN.  .. 5/17/2023 CLONIDINE .1 X 2   . CAD.  .. tr 5/17/2023 tr 56  .. 5/17/2023 ASA 81   .. 5/17/2023 ATORVASTAT 40   . A FIB RVR 5/17/2023   .. 5/19/2023 CARDIZEM 60.3   .. 5/17-5/19/2023 4P CARDIZEM DRIP 125  5/H  .. 5/17/2023 METOPROLOL 100.2   .. 5/19/2023 apixaba 2.5 x 2 (af) Dr Yun   . CHF.  .. bnp bnp 175k     HEMAT.  . ANEMIA  .. Hb 5/17-5/18-5/19/2023 Hb 10.5- 10.6 - 9.7   .. inr 5/17/2023 inr 125     GI.  . DYSPHAGIA     RENAL.  . ESRD  .. Na 5/17/2023 na 133   .. cr 5/17/2023 cr 4.6   .. HD     NEURO.  .. 5/17/2023 GABAPENTIN 100  .. 5/17/2023 IMIPRAMINE 50   .. 5/17/2023 RISPERIDAL .5       OVERALL.  . 72-year-old female former smoker with history of A-fib (not on ac sec fall risk, diabetes, hypertension, hyperlipidemia, ESRD hemodialysis, CHF, CAD/CABG, PVD was sent from [facility] 5/17/2023 with cough for 2 days. pt c/o dry cough, no fever, chills no sob, pt states she got dialysis 5/16/2023 ,   .. Pt was recently admitted 3/7-3/11/2023 and before that 2/22-2/25/2023   .. 5/17/2023 No antibio use last 3 m   .. Pt admitted 5/17/2023 Pulm consulted     COURSE   . Found to have enterorhinovirus and large l effs  . 5/18/2023 l thora done      ACTIVE PROBLEMS .  . ENTERORHINOVIRUS INFECTION RESP TRACT 5/17  . PLEURAL EFFUSION SP l THORA 5/18 TRANSUDATE   . PNEUMONIA 5/17/2023  .... 5/17/2023 JOSE ALEJANDRO CHEEK   . AF RVR 5/17-5/19/2023 Cardizem drip    . DYSPHAGIA 5/19/2023     OTHER PROBLEMS.  . CAD.  . HYTN.   . CHF.  . ESRD.      PMH.   CAD.  .. HISTORY ho cabg  A fib  .. 3/7/2023 Not on ac sec multiple falls  PAD  .. sp R-> L bifem - fem BK pop bypass   .. Fem pop bypass 6/21/2022   .. Partial ray amputation r great toe 6/22/2022   ESRD   .. On HD       TIME SPENT   Over 25 minutes aggregate care time spent on encounter; activities included   direct patient care, counseling and/or coordinating care reviewing notes, lab data/ imaging , discussion with multidisciplinary team/ patient  /family and explaining in detail risks, benefits, alternatives  of the recommendations     JOSE F LAO 72 f 5/17/2023 1948 DR HARRY ALBRIGHT

## 2023-05-19 NOTE — PROGRESS NOTE ADULT - ASSESSMENT
cugj - pn - had zosyn and vancomycin  ashd - atrial; fib - rvr - had cardizem  ashd- s/p mi  s/p cabg  pvd- s/p bypass  left pl effusion- thoracentasis ?  dm2  esrd - on hd  atrial fib - on cardizem drip- rate controlled - may need oac - to discuss with family- to continue iv cardizem 5/18  pt had thoracentasis 1100 cc fluid - pt is converted to rsr   discussed with mino reese  risks and benefits of noac- pt is started on eliquis  dc cardizem - start po cardizem 5/19

## 2023-05-19 NOTE — PROGRESS NOTE ADULT - SUBJECTIVE AND OBJECTIVE BOX
Patient is a 72y Female whom presented to the hospital with esrd on hd     PAST MEDICAL & SURGICAL HISTORY:  HTN (hypertension)      h/o Anxiety attack      Depression      h/o Myocardial infarct 2007      h/o Hepatitis A 1969  currently resolved, no symptoms      Murmur, cardiac      h/o Smoking  quitted 3/2012      Anemia secondary to renal failure      ESRD on dialysis      Falls      Ataxia      Type 2 diabetes mellitus      Peripheral vascular disease, unspecified      CAD (coronary artery disease)      Diabetes      coronary stent 2007      s/p Ovarian cyst removal      s/p surgical removal of benign Skin lesion epigastric area      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          MEDICATIONS  (STANDING):  aspirin enteric coated 81 milliGRAM(s) Oral daily  atorvastatin 40 milliGRAM(s) Oral at bedtime  cefTRIAXone   IVPB 1000 milliGRAM(s) IV Intermittent every 24 hours  cloNIDine 0.1 milliGRAM(s) Oral two times a day  dextrose 5%. 1000 milliLiter(s) (50 mL/Hr) IV Continuous <Continuous>  dextrose 5%. 1000 milliLiter(s) (100 mL/Hr) IV Continuous <Continuous>  dextrose 50% Injectable 12.5 Gram(s) IV Push once  dextrose 50% Injectable 25 Gram(s) IV Push once  dextrose 50% Injectable 25 Gram(s) IV Push once  diltiazem Infusion 5 mG/Hr (5 mL/Hr) IV Continuous <Continuous>  gabapentin 100 milliGRAM(s) Oral at bedtime  glucagon  Injectable 1 milliGRAM(s) IntraMuscular once  heparin   Injectable 5000 Unit(s) SubCutaneous every 12 hours  imipramine 50 milliGRAM(s) Oral daily  insulin lispro (ADMELOG) corrective regimen sliding scale   SubCutaneous three times a day before meals  melatonin 3 milliGRAM(s) Oral at bedtime  metoprolol tartrate 100 milliGRAM(s) Oral two times a day  multivitamin 1 Tablet(s) Oral daily  pantoprazole    Tablet 40 milliGRAM(s) Oral before breakfast  risperiDONE   Tablet 0.5 milliGRAM(s) Oral at bedtime  senna 2 Tablet(s) Oral at bedtime      Allergies    No Known Drug Allergies  latex (Hives)    Intolerances        SOCIAL HISTORY:  Denies ETOh,Smoking,     FAMILY HISTORY:  FH: myocardial infarction (Father)    FH: myocardial infarction (Father)    Family history of type 2 diabetes mellitus in brother (Sibling)        REVIEW OF SYSTEMS:    CONSTITUTIONAL: No weakness, fevers or chills  EYES/ENT: No visual changes;  no throat pain   NECK: No pain or stiffness  RESPIRATORY: No cough, wheezing, hemoptysis; No shortness of breath  CARDIOVASCULAR: No chest pain or palpitations  GASTROINTESTINAL: No abdominal or epigastric pain. No nausea, vomiting,     No diarrhea or constipation. No melena   GENITOURINARY: No dysuria, frequency or hematuria  NEUROLOGICAL: No numbness or weakness  SKIN: dry                              9.7    12.09 )-----------( 299      ( 19 May 2023 12:12 )             30.6       CBC Full  -  ( 19 May 2023 12:12 )  WBC Count : 12.09 K/uL  RBC Count : 3.18 M/uL  Hemoglobin : 9.7 g/dL  Hematocrit : 30.6 %  Platelet Count - Automated : 299 K/uL  Mean Cell Volume : 96.2 fl  Mean Cell Hemoglobin : 30.5 pg  Mean Cell Hemoglobin Concentration : 31.7 gm/dL  Auto Neutrophil # : x  Auto Lymphocyte # : x  Auto Monocyte # : x  Auto Eosinophil # : x  Auto Basophil # : x  Auto Neutrophil % : x  Auto Lymphocyte % : x  Auto Monocyte % : x  Auto Eosinophil % : x  Auto Basophil % : x      05-19    133<L>  |  102  |  24<H>  ----------------------------<  170<H>  3.5   |  29  |  3.90<H>    Ca    8.9      19 May 2023 12:12  Phos  4.8     05-18  Mg     2.2     05-18    TPro  6.4  /  Alb  2.7<L>  /  TBili  0.7  /  DBili  x   /  AST  21  /  ALT  17  /  AlkPhos  104  05-18      CAPILLARY BLOOD GLUCOSE      POCT Blood Glucose.: 151 mg/dL (19 May 2023 18:03)  POCT Blood Glucose.: 187 mg/dL (19 May 2023 16:27)  POCT Blood Glucose.: 163 mg/dL (19 May 2023 12:18)  POCT Blood Glucose.: 211 mg/dL (19 May 2023 08:04)  POCT Blood Glucose.: 187 mg/dL (18 May 2023 22:15)      Vital Signs Last 24 Hrs  T(C): 36.4 (19 May 2023 17:28), Max: 36.8 (19 May 2023 04:37)  T(F): 97.5 (19 May 2023 17:28), Max: 98.3 (19 May 2023 04:37)  HR: 71 (19 May 2023 17:28) (62 - 82)  BP: 150/65 (19 May 2023 17:28) (143/74 - 159/85)  BP(mean): --  RR: 18 (19 May 2023 17:28) (18 - 18)  SpO2: 98% (19 May 2023 17:28) (98% - 99%)    Parameters below as of 19 May 2023 17:28  Patient On (Oxygen Delivery Method): nasal cannula  O2 Flow (L/min): 3          PT/INR - ( 18 May 2023 06:30 )   PT: 15.6 sec;   INR: 1.33 ratio                     PHYSICAL EXAM:    Constitutional: NAD  HEENT: conjunctive   clear   Neck:  No JVD  Respiratory: CTAB  Cardiovascular: S1 and S2  Gastrointestinal: BS+, soft, NT/ND  Extremities: No peripheral edema  Neurological: A/O x 3, no focal deficits  Psychiatric: Normal mood, normal affect  : No Renteria  Skin: No rashes  Access: Not applicable    LABS:                        10.5   12.18 )-----------( 354      ( 17 May 2023 09:40 )             33.3     05-17    x   |  x   |  x   ----------------------------<  157<H>  x    |  x   |  x     Ca    9.6      17 May 2023 09:40    TPro  7.0  /  Alb  3.1<L>  /  TBili  0.8  /  DBili  x   /  AST  10<L>  /  ALT  21  /  AlkPhos  119  05-17      Urine Studies:          RADIOLOGY & ADDITIONAL STUDIES:

## 2023-05-19 NOTE — PROGRESS NOTE ADULT - SUBJECTIVE AND OBJECTIVE BOX
[facility]  INFECTIOUS DISEASES   [address] [place] [address]  Tel: [phone]     Fax: [phone]  ========================================================  MD Zita Alejandra Kaushal, MD Cho, Michelle, MD Sunjit, Jaspal, MD  ========================================================    [id number]  SHILO KOHLER     Follow up: Pneumonia    Lying in bed, looks comfortable, awake and alert, NAD but on o2 with NC.   Had thoracentesis yesterday, looks transudate.     PAST MEDICAL & SURGICAL HISTORY:  HTN (hypertension)  h/o Anxiety attack  Depression  h/o Myocardial infarct 2007  h/o Hepatitis A 1969  currently resolved, no symptoms  Murmur, cardiac  h/o Smoking  quitted 3/2012  Anemia secondary to renal failure  ESRD on dialysis  Falls  Ataxia  Type 2 diabetes mellitus  Peripheral vascular disease, unspecified  CAD (coronary artery disease)  Diabetes  coronary stent 2007  s/p Ovarian cyst removal  s/p surgical removal of benign Skin lesion epigastric area  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    Social Hx: Former smoker, no ETOH or drugs     FAMILY HISTORY:  FH: myocardial infarction (Father)    FH: myocardial infarction (Father)    Family history of type 2 diabetes mellitus in brother (Sibling)    Allergies  No Known Drug Allergies  latex (Hives)    MEDICATIONS  (STANDING):  aspirin enteric coated 81 milliGRAM(s) Oral daily  atorvastatin 40 milliGRAM(s) Oral at bedtime  cefTRIAXone   IVPB 1000 milliGRAM(s) IV Intermittent every 24 hours  cloNIDine 0.1 milliGRAM(s) Oral two times a day  dextrose 5%. 1000 milliLiter(s) (50 mL/Hr) IV Continuous <Continuous>  dextrose 5%. 1000 milliLiter(s) (100 mL/Hr) IV Continuous <Continuous>  dextrose 50% Injectable 12.5 Gram(s) IV Push once  dextrose 50% Injectable 25 Gram(s) IV Push once  dextrose 50% Injectable 25 Gram(s) IV Push once  diltiazem Infusion 5 mG/Hr (5 mL/Hr) IV Continuous <Continuous>  dronabinol 2.5 milliGRAM(s) Oral two times a day before meals  gabapentin 100 milliGRAM(s) Oral at bedtime  glucagon  Injectable 1 milliGRAM(s) IntraMuscular once  heparin   Injectable 5000 Unit(s) SubCutaneous every 12 hours  imipramine 50 milliGRAM(s) Oral daily  insulin lispro (ADMELOG) corrective regimen sliding scale   SubCutaneous three times a day before meals  melatonin 3 milliGRAM(s) Oral at bedtime  metoprolol tartrate 100 milliGRAM(s) Oral two times a day  multivitamin 1 Tablet(s) Oral daily  pantoprazole    Tablet 40 milliGRAM(s) Oral before breakfast  risperiDONE   Tablet 0.5 milliGRAM(s) Oral at bedtime  senna 2 Tablet(s) Oral at bedtime     REVIEW OF SYSTEMS:  CONSTITUTIONAL:  No Fever or chills  HEENT:  No diplopia or blurred vision.  No sore throat or runny nose.  CARDIOVASCULAR:  No chest pain or SOB.  RESPIRATORY:  No cough, shortness of breath, PND or orthopnea.  GASTROINTESTINAL:  No nausea, vomiting or diarrhea.  GENITOURINARY:  No dysuria, frequency or urgency. No Blood in urine  MUSCULOSKELETAL:  no joint aches, no muscle pain  SKIN:  No change in skin, hair or nails.  NEUROLOGIC:  No paresthesias or weakness.  PSYCHIATRIC:  No disorder of thought or mood.  ENDOCRINE:  No heat or cold intolerance, polyuria or polydipsia.  HEMATOLOGICAL:  No easy bruising or bleeding.     Physical Exam:  Vital Signs Last 24 Hrs  T(C): 36.4 (19 May 2023 11:56), Max: 36.8 (18 May 2023 13:56)  T(F): 97.6 (19 May 2023 11:56), Max: 98.3 (19 May 2023 04:37)  HR: 67 (19 May 2023 11:56) (67 - 98)  BP: 159/85 (19 May 2023 11:56) (98/57 - 159/85)  BP(mean): --  RR: 18 (19 May 2023 11:56) (18 - 24)  SpO2: 99% (19 May 2023 11:56) (96% - 100%)  Parameters below as of 19 May 2023 11:56  Patient On (Oxygen Delivery Method): nasal cannula  O2 Flow (L/min): 3  GEN: NAD  HEENT: normocephalic and atraumatic. EOMI. PERRL.    NECK: Supple.  No lymphadenopathy   LUNGS: Decreased breath sounds bilaterally more in lower lobes  with some crackles both side   HEART: Regular rate and rhythm   ABDOMEN: Soft, nontender, and nondistended.  Positive bowel sounds.    : No CVA tenderness  EXTREMITIES: Without edema.  NEUROLOGIC: grossly intact.  PSYCHIATRIC: Appropriate affect .  SKIN: No rash       Labs:                        10.6   15.50 )-----------( 296      ( 18 May 2023 06:30 )             33.7     05-18    134<L>  |  100  |  39<H>  ----------------------------<  114<H>  5.0   |  26  |  5.40<H>    Ca    9.1      18 May 2023 06:30  Phos  4.8     05-18  Mg     2.2     05-18    TPro  6.4  /  Alb  2.7<L>  /  TBili  0.7  /  DBili  x   /  AST  21  /  ALT  17  /  AlkPhos  104  05-18    Culture - Fungal, Body Fluid (collected 05-18-23 @ 13:10)  Source: Pleural Fl Pleural Fluid    Culture - Body Fluid with Gram Stain (collected 05-18-23 @ 13:10)  Source: Pleural Fl Pleural Fluid  Gram Stain (05-18-23 @ 22:17):    polymorphonuclear leukocytes seen    No organisms seen    by cytocentrifuge    Culture - Blood (collected 05-17-23 @ 09:42)  Source: .Blood Blood-Peripheral    Culture - Blood (collected 05-17-23 @ 09:40)  Source: .Blood Blood-Peripheral    WBC Count: 15.50 K/uL (05-18-23 @ 06:30)  WBC Count: 12.18 K/uL (05-17-23 @ 09:40)    Creatinine, Serum: 5.40 mg/dL (05-18-23 @ 06:30)  Creatinine, Serum: 4.60 mg/dL (05-17-23 @ 09:40)    Procalcitonin, Serum: 0.86 ng/mL (05-18-23 @ 06:30)  Procalcitonin, Serum: 0.90 ng/mL (05-17-23 @ 09:40)     SARS-CoV-2: NotDetec (05-17-23 @ 09:40)    All imaging and other data have been reviewed.  < from: CT Chest No Cont (05.17.23 @ 10:15) >  IMPRESSION:  Small to moderate right-sided pleural effusion and associated compressive   atelectasis.  Moderate left-sided pleural effusion with atelectasis/airspace   consolidation left lower lobe.  Groundglass and airspace consolidation posterior segment left upper lobe,   could reflect edema or superimposed infection.  Other findings as discussed above.    Assessment and Plan:   71yo woman with PMH of HTN, DM2, ESRD on HD, CAD, depression/anxiety and former smoker was seen in ED with cough for 2 days.   RVP positive for enterovirus  chest CT that showed bilateral effusion more in left with compressive atelectasis vs consolidation.   Procalcitonin 0.9  WBC on admission 12k  Tmax 99.1  RVP enterovirus +   5/18 left thoracentesis looks transudate, culture NGTD    Recommendations:  - Leukocytosis was 15 yesterday, will monitor today labs pending   - Blood cultures NGTD  - Pleural fluid culture NGTD  - Continue ceftriaxone 1gm daily  - Follow up Legionella U ag and MRSA PCR  - Will treat for total 5days if remains stable     Will follow PRN.    Geovany Long MD  Division of Infectious Diseases   Please call ID service at [phone] with any question.    35 minutes spent on total encounter assessing patient, examination, chart review, counseling and coordinating care by the attending physician/nurse/care manager.

## 2023-05-19 NOTE — PROGRESS NOTE ADULT - ASSESSMENT
71yo female bib ems with cough for 2 days. pt c/o dry cough, no fever, chills no sob, pt states she got dialysis yesterday, +smoking hx no other complaintsRVP positive for enterovirus /Chest xray at [facility] showed L lung opacification .In ER found to have rapid afib and seen by cardiologist Admitted  to telemetry unit for monitoring , send 3 sets of cardiac enzymes to rule out acute coronary event, obtain ECHO to evaluate LVEF, cardiology consult  ,continue current management, O2 supply, anticoagulation plan as per cardiology consult  chest CT that showed bilateral effusion more in left with compressive atelectasis vs consolidation. Admit to monitored unit for cardiac monitoring, obtain echo to evaluate LVEF, intravenous diuresis as per card consult , monitor ins/outs, monitor renal profile and electrolytes closely ,send 3 sets of enzymes, O2 supply, serial chest xrays, monitor weights and oral intake of fluids, nutritionist consult .Seen b y pulmonologist and ID consult Procalcitonin 0.9 WBC on admission 12k Tmax 99.1 This could be just viral infection causing cough but since Chest CT has questionable consolidations, will cover for CAP. Also procalcitonin is slightly high due to ESRD. Palliative care consult requested ,to discuss advance directives and complete MOLST

## 2023-05-19 NOTE — CONSULT NOTE ADULT - ASSESSMENT
71yo female bib ems with cough for 2 days. pt c/o dry cough, no fever, chills no sob, pt states she got dialysis yesterday, +smoking hx no other complaints RVP positive for enterovirus /Chest xray at [facility] showed L lung opacification    pleural eff  atelectasis  esrd  anemia  OP  OA  URI  HTN  CAD  Smoker   71yo female bib ems with cough for 2 days. pt c/o dry cough, no fever, chills no sob, pt states she got dialysis yesterday, +smoking hx no other complaints RVP positive for enterovirus /Chest xray at [facility] showed L lung opacification    pleural eff  atelectasis  esrd  anemia  OP  OA  URI  HTN  CAD  Smoker    pt is DNR DNI  no feeding tube  ok for Dialysis  MOLST filled out - signed - updated

## 2023-05-19 NOTE — CONSULT NOTE ADULT - SUBJECTIVE AND OBJECTIVE BOX
Date/Time Patient Seen:  		  Referring MD:   Data Reviewed	       Patient is a 72y old  Female who presents with a chief complaint of ABNORMAL XRAY OF CHEST (18 May 2023 20:35)      Subjective/HPI   71yo female bib ems with cough for 2 days. pt c/o dry cough, no fever, chills no sob, pt states she got dialysis yesterday, +smoking hx no other complaintsRVP positive for enterovirus /Chest xray at [facility] showed L lung opacification .In ER found to have rapid afib and seen by cardiologist Admitted  to telemetry unit for monitoring , send 3 sets of cardiac enzymes to rule out acute coronary event, obtain ECHO to evaluate LVEF, cardiology consult  ,continue current management, O2 supply, anticoagulation plan as per cardiology consult  chest CT that showed bilateral effusion more in left with compressive atelectasis vs consolidation. Admit to monitored unit for cardiac monitoring, obtain echo to evaluate LVEF, intravenous diuresis as per card consult , monitor ins/outs, monitor renal profile and electrolytes closely ,send 3 sets of enzymes, O2 supply, serial chest xrays, monitor weights and oral intake of fluids, nutritionist consult .Seen b y pulmonologist and ID consult Procalcitonin 0.9 WBC on admission 12k Tmax 99.1 This could be just viral infection causing cough but since Chest CT has questionable consolidations, will cover for CAP. Also procalcitonin is slightly high due to ESRD. Palliative care consult requested ,to discuss advance directives and complete MOLST   PAST MEDICAL & SURGICAL HISTORY:  Diabetes mellitus II    HTN (hypertension)    h/o Anxiety attack    Depression    h/o Myocardial infarct 2007    CAD (coronary artery disease)    CAD (coronary artery disease)    h/o Hepatitis A 1969  currently resolved, no symptoms    PAD (peripheral artery disease)    Murmur, cardiac    h/o Smoking  quitted 3/2012    CRF (chronic renal failure), unspecified stage    Dialysis patient    Anemia secondary to renal failure    HTN (hypertension)    Osteomyelitis    ESRD on dialysis    Falls    Ataxia    Type 2 diabetes mellitus    Peripheral vascular disease, unspecified    CAD (coronary artery disease)    Diabetes    coronary stent 2007    s/p Ovarian cyst removal    s/p surgical removal of benign Skin lesion epigastric area    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    FAMILY HISTORY:  Father  Still living? No  FH: myocardial infarction, Age at diagnosis: Age Unknown  FH: myocardial infarction, Age at diagnosis: Age Unknown    Sibling  Still living? No  Family history of type 2 diabetes mellitus in brother, Age at diagnosis: Age Unknown.     Social History:  · Substance use	No     Tobacco Screening:  · Core Measure Site	Yes  · Has the patient used tobacco in the past 30 days?	No    Risk Assessment:    Present on Admission:  Deep Venous Thrombosis	no  Pulmonary Embolus	no     HIV Screening:  · In accordance with [place] State law, we offer every patient who comes to our ED an HIV test. Would you like to be tested today?	Previously Declined (within the last year)        Medication list         MEDICATIONS  (STANDING):  aspirin enteric coated 81 milliGRAM(s) Oral daily  atorvastatin 40 milliGRAM(s) Oral at bedtime  cefTRIAXone   IVPB 1000 milliGRAM(s) IV Intermittent every 24 hours  cloNIDine 0.1 milliGRAM(s) Oral two times a day  dextrose 5%. 1000 milliLiter(s) (50 mL/Hr) IV Continuous <Continuous>  dextrose 5%. 1000 milliLiter(s) (100 mL/Hr) IV Continuous <Continuous>  dextrose 50% Injectable 12.5 Gram(s) IV Push once  dextrose 50% Injectable 25 Gram(s) IV Push once  dextrose 50% Injectable 25 Gram(s) IV Push once  diltiazem Infusion 5 mG/Hr (5 mL/Hr) IV Continuous <Continuous>  dronabinol 2.5 milliGRAM(s) Oral two times a day before meals  gabapentin 100 milliGRAM(s) Oral at bedtime  glucagon  Injectable 1 milliGRAM(s) IntraMuscular once  heparin   Injectable 5000 Unit(s) SubCutaneous every 12 hours  imipramine 50 milliGRAM(s) Oral daily  insulin lispro (ADMELOG) corrective regimen sliding scale   SubCutaneous three times a day before meals  melatonin 3 milliGRAM(s) Oral at bedtime  metoprolol tartrate 100 milliGRAM(s) Oral two times a day  mirtazapine 7.5 milliGRAM(s) Oral at bedtime  multivitamin 1 Tablet(s) Oral daily  pantoprazole    Tablet 40 milliGRAM(s) Oral before breakfast  risperiDONE   Tablet 0.5 milliGRAM(s) Oral at bedtime  senna 2 Tablet(s) Oral at bedtime    MEDICATIONS  (PRN):  acetaminophen     Tablet .. 650 milliGRAM(s) Oral every 6 hours PRN Temp greater or equal to 38C (100.4F), Mild Pain (1 - 3)  aluminum hydroxide/magnesium hydroxide/simethicone Suspension 30 milliLiter(s) Oral every 4 hours PRN Dyspepsia  dextrose Oral Gel 15 Gram(s) Oral once PRN Blood Glucose LESS THAN 70 milliGRAM(s)/deciliter  ondansetron Injectable 4 milliGRAM(s) IV Push every 8 hours PRN Nausea and/or Vomiting         Vitals log        ICU Vital Signs Last 24 Hrs  T(C): 36.8 (19 May 2023 04:37), Max: 37.2 (18 May 2023 07:45)  T(F): 98.3 (19 May 2023 04:37), Max: 98.9 (18 May 2023 07:45)  HR: 82 (19 May 2023 04:37) (76 - 98)  BP: 146/74 (19 May 2023 04:37) (98/57 - 149/77)  BP(mean): --  ABP: --  ABP(mean): --  RR: 18 (19 May 2023 04:37) (18 - 24)  SpO2: 98% (19 May 2023 04:37) (96% - 100%)    O2 Parameters below as of 19 May 2023 04:37  Patient On (Oxygen Delivery Method): nasal cannula  O2 Flow (L/min): 3               Input and Output:  I&O's Detail    17 May 2023 07:01  -  18 May 2023 07:00  --------------------------------------------------------  IN:    Diltiazem: 30 mL  Total IN: 30 mL    OUT:  Total OUT: 0 mL    Total NET: 30 mL      18 May 2023 07:01  -  19 May 2023 05:55  --------------------------------------------------------  IN:  Total IN: 0 mL    OUT:    Other (mL): 1310 mL    Other (mL): 1500 mL  Total OUT: 2810 mL    Total NET: -2810 mL          Lab Data                        10.6   15.50 )-----------( 296      ( 18 May 2023 06:30 )             33.7     05-18    134<L>  |  100  |  39<H>  ----------------------------<  114<H>  5.0   |  26  |  5.40<H>    Ca    9.1      18 May 2023 06:30  Phos  4.8     05-18  Mg     2.2     05-18    TPro  6.4  /  Alb  2.7<L>  /  TBili  0.7  /  DBili  x   /  AST  21  /  ALT  17  /  AlkPhos  104  05-18    ABG - ( 18 May 2023 05:00 )  pH, Arterial: 7.39  pH, Blood: x     /  pCO2: 45    /  pO2: 65    / HCO3: 27    / Base Excess: 2.2   /  SaO2: 94.0                    Review of Systems	  weakness  sob  aldana      Objective     Physical Examination        Pertinent Lab findings & Imaging      Dexter:  NO   Adequate UO     I&O's Detail    17 May 2023 07:01  -  18 May 2023 07:00  --------------------------------------------------------  IN:    Diltiazem: 30 mL  Total IN: 30 mL    OUT:  Total OUT: 0 mL    Total NET: 30 mL      18 May 2023 07:01  -  19 May 2023 05:55  --------------------------------------------------------  IN:  Total IN: 0 mL    OUT:    Other (mL): 1310 mL    Other (mL): 1500 mL  Total OUT: 2810 mL    Total NET: -2810 mL               Discussed with:     Cultures:	        Radiology                             Date/Time Patient Seen:  		  Referring MD:   Data Reviewed	       Patient is a 72y old  Female who presents with a chief complaint of ABNORMAL XRAY OF CHEST (18 May 2023 20:35)      Subjective/HPI   71yo female bib ems with cough for 2 days. pt c/o dry cough, no fever, chills no sob, pt states she got dialysis yesterday, +smoking hx no other complaintsRVP positive for enterovirus /Chest xray at [facility] showed L lung opacification .In ER found to have rapid afib and seen by cardiologist Admitted  to telemetry unit for monitoring , send 3 sets of cardiac enzymes to rule out acute coronary event, obtain ECHO to evaluate LVEF, cardiology consult  ,continue current management, O2 supply, anticoagulation plan as per cardiology consult  chest CT that showed bilateral effusion more in left with compressive atelectasis vs consolidation. Admit to monitored unit for cardiac monitoring, obtain echo to evaluate LVEF, intravenous diuresis as per card consult , monitor ins/outs, monitor renal profile and electrolytes closely ,send 3 sets of enzymes, O2 supply, serial chest xrays, monitor weights and oral intake of fluids, nutritionist consult .Seen b y pulmonologist and ID consult Procalcitonin 0.9 WBC on admission 12k Tmax 99.1 This could be just viral infection causing cough but since Chest CT has questionable consolidations, will cover for CAP. Also procalcitonin is slightly high due to ESRD. Palliative care consult requested ,to discuss advance directives and complete MOLST   PAST MEDICAL & SURGICAL HISTORY:  Diabetes mellitus II    HTN (hypertension)    h/o Anxiety attack    Depression    h/o Myocardial infarct 2007    CAD (coronary artery disease)    CAD (coronary artery disease)    h/o Hepatitis A 1969  currently resolved, no symptoms    PAD (peripheral artery disease)    Murmur, cardiac    h/o Smoking  quitted 3/2012    CRF (chronic renal failure), unspecified stage    Dialysis patient    Anemia secondary to renal failure    HTN (hypertension)    Osteomyelitis    ESRD on dialysis    Falls    Ataxia    Type 2 diabetes mellitus    Peripheral vascular disease, unspecified    CAD (coronary artery disease)    Diabetes    coronary stent 2007    s/p Ovarian cyst removal    s/p surgical removal of benign Skin lesion epigastric area    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    FAMILY HISTORY:  Father  Still living? No  FH: myocardial infarction, Age at diagnosis: Age Unknown  FH: myocardial infarction, Age at diagnosis: Age Unknown    Sibling  Still living? No  Family history of type 2 diabetes mellitus in brother, Age at diagnosis: Age Unknown.     Social History:  · Substance use	No     Tobacco Screening:  · Core Measure Site	Yes  · Has the patient used tobacco in the past 30 days?	No    Risk Assessment:    Present on Admission:  Deep Venous Thrombosis	no  Pulmonary Embolus	no     HIV Screening:  · In accordance with [place] law, we offer every patient who comes to our ED an HIV test. Would you like to be tested today?	Previously Declined (within the last year)        Medication list         MEDICATIONS  (STANDING):  aspirin enteric coated 81 milliGRAM(s) Oral daily  atorvastatin 40 milliGRAM(s) Oral at bedtime  cefTRIAXone   IVPB 1000 milliGRAM(s) IV Intermittent every 24 hours  cloNIDine 0.1 milliGRAM(s) Oral two times a day  dextrose 5%. 1000 milliLiter(s) (50 mL/Hr) IV Continuous <Continuous>  dextrose 5%. 1000 milliLiter(s) (100 mL/Hr) IV Continuous <Continuous>  dextrose 50% Injectable 12.5 Gram(s) IV Push once  dextrose 50% Injectable 25 Gram(s) IV Push once  dextrose 50% Injectable 25 Gram(s) IV Push once  diltiazem Infusion 5 mG/Hr (5 mL/Hr) IV Continuous <Continuous>  dronabinol 2.5 milliGRAM(s) Oral two times a day before meals  gabapentin 100 milliGRAM(s) Oral at bedtime  glucagon  Injectable 1 milliGRAM(s) IntraMuscular once  heparin   Injectable 5000 Unit(s) SubCutaneous every 12 hours  imipramine 50 milliGRAM(s) Oral daily  insulin lispro (ADMELOG) corrective regimen sliding scale   SubCutaneous three times a day before meals  melatonin 3 milliGRAM(s) Oral at bedtime  metoprolol tartrate 100 milliGRAM(s) Oral two times a day  mirtazapine 7.5 milliGRAM(s) Oral at bedtime  multivitamin 1 Tablet(s) Oral daily  pantoprazole    Tablet 40 milliGRAM(s) Oral before breakfast  risperiDONE   Tablet 0.5 milliGRAM(s) Oral at bedtime  senna 2 Tablet(s) Oral at bedtime    MEDICATIONS  (PRN):  acetaminophen     Tablet .. 650 milliGRAM(s) Oral every 6 hours PRN Temp greater or equal to 38C (100.4F), Mild Pain (1 - 3)  aluminum hydroxide/magnesium hydroxide/simethicone Suspension 30 milliLiter(s) Oral every 4 hours PRN Dyspepsia  dextrose Oral Gel 15 Gram(s) Oral once PRN Blood Glucose LESS THAN 70 milliGRAM(s)/deciliter  ondansetron Injectable 4 milliGRAM(s) IV Push every 8 hours PRN Nausea and/or Vomiting         Vitals log        ICU Vital Signs Last 24 Hrs  T(C): 36.8 (19 May 2023 04:37), Max: 37.2 (18 May 2023 07:45)  T(F): 98.3 (19 May 2023 04:37), Max: 98.9 (18 May 2023 07:45)  HR: 82 (19 May 2023 04:37) (76 - 98)  BP: 146/74 (19 May 2023 04:37) (98/57 - 149/77)  BP(mean): --  ABP: --  ABP(mean): --  RR: 18 (19 May 2023 04:37) (18 - 24)  SpO2: 98% (19 May 2023 04:37) (96% - 100%)    O2 Parameters below as of 19 May 2023 04:37  Patient On (Oxygen Delivery Method): nasal cannula  O2 Flow (L/min): 3               Input and Output:  I&O's Detail    17 May 2023 07:01  -  18 May 2023 07:00  --------------------------------------------------------  IN:    Diltiazem: 30 mL  Total IN: 30 mL    OUT:  Total OUT: 0 mL    Total NET: 30 mL      18 May 2023 07:01  -  19 May 2023 05:55  --------------------------------------------------------  IN:  Total IN: 0 mL    OUT:    Other (mL): 1310 mL    Other (mL): 1500 mL  Total OUT: 2810 mL    Total NET: -2810 mL          Lab Data                        10.6   15.50 )-----------( 296      ( 18 May 2023 06:30 )             33.7     05-18    134<L>  |  100  |  39<H>  ----------------------------<  114<H>  5.0   |  26  |  5.40<H>    Ca    9.1      18 May 2023 06:30  Phos  4.8     05-18  Mg     2.2     05-18    TPro  6.4  /  Alb  2.7<L>  /  TBili  0.7  /  DBili  x   /  AST  21  /  ALT  17  /  AlkPhos  104  05-18    ABG - ( 18 May 2023 05:00 )  pH, Arterial: 7.39  pH, Blood: x     /  pCO2: 45    /  pO2: 65    / HCO3: 27    / Base Excess: 2.2   /  SaO2: 94.0                    Review of Systems	  weakness  sob  aldana      Objective     Physical Examination    heart s1s2  lung dec bS  head nc  on o2 support    Pertinent Lab findings & Imaging      Dexter:  NO   Adequate UO     I&O's Detail    17 May 2023 07:01  -  18 May 2023 07:00  --------------------------------------------------------  IN:    Diltiazem: 30 mL  Total IN: 30 mL    OUT:  Total OUT: 0 mL    Total NET: 30 mL      18 May 2023 07:01  -  19 May 2023 05:55  --------------------------------------------------------  IN:  Total IN: 0 mL    OUT:    Other (mL): 1310 mL    Other (mL): 1500 mL  Total OUT: 2810 mL    Total NET: -2810 mL               Discussed with:     Cultures:	        Radiology

## 2023-05-20 DIAGNOSIS — G93.41 METABOLIC ENCEPHALOPATHY: ICD-10-CM

## 2023-05-20 LAB
ANION GAP SERPL CALC-SCNC: 3 MMOL/L — LOW (ref 5–17)
BUN SERPL-MCNC: 34 MG/DL — HIGH (ref 7–23)
CALCIUM SERPL-MCNC: 9.3 MG/DL — SIGNIFICANT CHANGE UP (ref 8.5–10.1)
CHLORIDE SERPL-SCNC: 101 MMOL/L — SIGNIFICANT CHANGE UP (ref 96–108)
CO2 SERPL-SCNC: 32 MMOL/L — HIGH (ref 22–31)
CREAT SERPL-MCNC: 5.3 MG/DL — HIGH (ref 0.5–1.3)
EGFR: 8 ML/MIN/1.73M2 — LOW
GLUCOSE SERPL-MCNC: 199 MG/DL — HIGH (ref 70–99)
HCT VFR BLD CALC: 34.6 % — SIGNIFICANT CHANGE UP (ref 34.5–45)
HGB BLD-MCNC: 10.3 G/DL — LOW (ref 11.5–15.5)
LDH SERPL L TO P-CCNC: 259 U/L — HIGH (ref 50–242)
MCHC RBC-ENTMCNC: 29.8 GM/DL — LOW (ref 32–36)
MCHC RBC-ENTMCNC: 30.7 PG — SIGNIFICANT CHANGE UP (ref 27–34)
MCV RBC AUTO: 103.3 FL — HIGH (ref 80–100)
NRBC # BLD: 0 /100 WBCS — SIGNIFICANT CHANGE UP (ref 0–0)
PLATELET # BLD AUTO: 317 K/UL — SIGNIFICANT CHANGE UP (ref 150–400)
POTASSIUM SERPL-MCNC: 3.7 MMOL/L — SIGNIFICANT CHANGE UP (ref 3.5–5.3)
POTASSIUM SERPL-SCNC: 3.7 MMOL/L — SIGNIFICANT CHANGE UP (ref 3.5–5.3)
RBC # BLD: 3.35 M/UL — LOW (ref 3.8–5.2)
RBC # FLD: 15.6 % — HIGH (ref 10.3–14.5)
SODIUM SERPL-SCNC: 136 MMOL/L — SIGNIFICANT CHANGE UP (ref 135–145)
WBC # BLD: 13.77 K/UL — HIGH (ref 3.8–10.5)
WBC # FLD AUTO: 13.77 K/UL — HIGH (ref 3.8–10.5)

## 2023-05-20 PROCEDURE — 99233 SBSQ HOSP IP/OBS HIGH 50: CPT

## 2023-05-20 PROCEDURE — 93010 ELECTROCARDIOGRAM REPORT: CPT

## 2023-05-20 RX ORDER — SODIUM CHLORIDE 9 MG/ML
1000 INJECTION, SOLUTION INTRAVENOUS
Refills: 0 | Status: DISCONTINUED | OUTPATIENT
Start: 2023-05-20 | End: 2023-05-21

## 2023-05-20 RX ORDER — SODIUM CHLORIDE 9 MG/ML
1000 INJECTION, SOLUTION INTRAVENOUS
Refills: 0 | Status: DISCONTINUED | OUTPATIENT
Start: 2023-05-20 | End: 2023-05-20

## 2023-05-20 RX ADMIN — Medication 1 TABLET(S): at 17:25

## 2023-05-20 RX ADMIN — Medication 2.5 MILLIGRAM(S): at 17:25

## 2023-05-20 RX ADMIN — Medication 50 MILLIGRAM(S): at 14:25

## 2023-05-20 RX ADMIN — SODIUM CHLORIDE 50 MILLILITER(S): 9 INJECTION, SOLUTION INTRAVENOUS at 18:06

## 2023-05-20 RX ADMIN — Medication 100 MILLIGRAM(S): at 05:07

## 2023-05-20 RX ADMIN — Medication 1: at 17:25

## 2023-05-20 RX ADMIN — Medication 0.1 MILLIGRAM(S): at 05:06

## 2023-05-20 RX ADMIN — Medication 2: at 08:04

## 2023-05-20 RX ADMIN — MIRTAZAPINE 7.5 MILLIGRAM(S): 45 TABLET, ORALLY DISINTEGRATING ORAL at 23:04

## 2023-05-20 RX ADMIN — Medication 60 MILLIGRAM(S): at 14:26

## 2023-05-20 RX ADMIN — SODIUM CHLORIDE 50 MILLILITER(S): 9 INJECTION, SOLUTION INTRAVENOUS at 23:03

## 2023-05-20 RX ADMIN — PANTOPRAZOLE SODIUM 40 MILLIGRAM(S): 20 TABLET, DELAYED RELEASE ORAL at 05:06

## 2023-05-20 RX ADMIN — Medication 81 MILLIGRAM(S): at 14:25

## 2023-05-20 RX ADMIN — Medication 60 MILLIGRAM(S): at 05:08

## 2023-05-20 RX ADMIN — Medication 60 MILLIGRAM(S): at 23:04

## 2023-05-20 RX ADMIN — CEFTRIAXONE 100 MILLIGRAM(S): 500 INJECTION, POWDER, FOR SOLUTION INTRAMUSCULAR; INTRAVENOUS at 23:02

## 2023-05-20 RX ADMIN — APIXABAN 2.5 MILLIGRAM(S): 2.5 TABLET, FILM COATED ORAL at 17:25

## 2023-05-20 RX ADMIN — SENNA PLUS 2 TABLET(S): 8.6 TABLET ORAL at 23:04

## 2023-05-20 RX ADMIN — Medication 100 MILLIGRAM(S): at 17:25

## 2023-05-20 RX ADMIN — Medication 0.1 MILLIGRAM(S): at 17:25

## 2023-05-20 RX ADMIN — APIXABAN 2.5 MILLIGRAM(S): 2.5 TABLET, FILM COATED ORAL at 05:06

## 2023-05-20 RX ADMIN — Medication 200 MILLIGRAM(S): at 01:11

## 2023-05-20 RX ADMIN — Medication 2.5 MILLIGRAM(S): at 05:06

## 2023-05-20 NOTE — PROGRESS NOTE ADULT - ASSESSMENT
71yo female bib ems with cough for 2 days. pt c/o dry cough, no fever, chills no sob, pt states she got dialysis yesterday, +smoking hx no other complaintsRVP positive for enterovirus /Chest xray at [facility] showed L lung opacification .In ER found to have rapid afib and seen by cardiologist Admitted  to telemetry unit for monitoring , send 3 sets of cardiac enzymes to rule out acute coronary event, obtain ECHO to evaluate LVEF, cardiology consult  ,continue current management, O2 supply, anticoagulation plan as per cardiology consult  chest CT that showed bilateral effusion more in left with compressive atelectasis vs consolidation. Admit to monitored unit for cardiac monitoring, obtain echo to evaluate LVEF, intravenous diuresis as per card consult , monitor ins/outs, monitor renal profile and electrolytes closely ,send 3 sets of enzymes, O2 supply, serial chest xrays, monitor weights and oral intake of fluids, nutritionist consult .Seen b y pulmonologist and ID consult Procalcitonin 0.9 WBC on admission 12k Tmax 99.1 This could be just viral infection causing cough but since Chest CT has questionable consolidations, will cover for CAP. Also procalcitonin is slightly high due to ESRD. Palliative care consult requested ,to discuss advance directives and complete MOLST  (17 May 2023 14:01)        esrd on hd   Excess fluids and waste products will be removed from your blood; your electrolytes will be balanced; your blood pressure will be controlled.      ANEMIA PLAN:  Anemia of chronic disease:  Well controlled by Aranesp  H and H subtherapeutic .  We will continue Aranesp aiming for a HCT of 32-36 %.   We will monitor Iron stores, B12 and RBC folate .      BP monitoring,continue current antihypertensive meds, low salt diet,followup with PMD in 1-2 weeks  cloNIDine 0.1 milliGRAM(s) Oral two times a day

## 2023-05-20 NOTE — PROGRESS NOTE ADULT - SUBJECTIVE AND OBJECTIVE BOX
Date/Time Patient Seen:  		  Referring MD:   Data Reviewed	       Patient is a 72y old  Female who presents with a chief complaint of ABNORMAL XRAY OF CHEST (19 May 2023 12:01)      Subjective/HPI     PAST MEDICAL & SURGICAL HISTORY:  Diabetes mellitus II    HTN (hypertension)    h/o Anxiety attack    Depression    h/o Myocardial infarct 2007    CAD (coronary artery disease)    CAD (coronary artery disease)    h/o Hepatitis A 1969  currently resolved, no symptoms    PAD (peripheral artery disease)    Murmur, cardiac    h/o Smoking  quitted 3/2012    CRF (chronic renal failure), unspecified stage    Dialysis patient    Anemia secondary to renal failure    HTN (hypertension)    Osteomyelitis    ESRD on dialysis    Falls    Ataxia    Type 2 diabetes mellitus    Peripheral vascular disease, unspecified    CAD (coronary artery disease)    Diabetes    coronary stent 2007    s/p Ovarian cyst removal    s/p surgical removal of benign Skin lesion epigastric area    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          Medication list         MEDICATIONS  (STANDING):  apixaban 2.5 milliGRAM(s) Oral two times a day  aspirin enteric coated 81 milliGRAM(s) Oral daily  cefTRIAXone   IVPB 1000 milliGRAM(s) IV Intermittent every 24 hours  cloNIDine 0.1 milliGRAM(s) Oral two times a day  dextrose 5%. 1000 milliLiter(s) (50 mL/Hr) IV Continuous <Continuous>  dextrose 5%. 1000 milliLiter(s) (100 mL/Hr) IV Continuous <Continuous>  dextrose 50% Injectable 12.5 Gram(s) IV Push once  dextrose 50% Injectable 25 Gram(s) IV Push once  dextrose 50% Injectable 25 Gram(s) IV Push once  diltiazem    Tablet 60 milliGRAM(s) Oral three times a day  dronabinol 2.5 milliGRAM(s) Oral two times a day before meals  glucagon  Injectable 1 milliGRAM(s) IntraMuscular once  imipramine 50 milliGRAM(s) Oral daily  insulin lispro (ADMELOG) corrective regimen sliding scale   SubCutaneous three times a day before meals  metoprolol tartrate 100 milliGRAM(s) Oral two times a day  mirtazapine 7.5 milliGRAM(s) Oral at bedtime  multivitamin 1 Tablet(s) Oral daily  pantoprazole    Tablet 40 milliGRAM(s) Oral before breakfast  senna 2 Tablet(s) Oral at bedtime    MEDICATIONS  (PRN):  acetaminophen     Tablet .. 650 milliGRAM(s) Oral every 6 hours PRN Temp greater or equal to 38C (100.4F), Mild Pain (1 - 3)  aluminum hydroxide/magnesium hydroxide/simethicone Suspension 30 milliLiter(s) Oral every 4 hours PRN Dyspepsia  dextrose Oral Gel 15 Gram(s) Oral once PRN Blood Glucose LESS THAN 70 milliGRAM(s)/deciliter  ondansetron Injectable 4 milliGRAM(s) IV Push every 8 hours PRN Nausea and/or Vomiting         Vitals log        ICU Vital Signs Last 24 Hrs  T(C): 36.8 (20 May 2023 05:22), Max: 36.8 (20 May 2023 05:22)  T(F): 98.2 (20 May 2023 05:22), Max: 98.2 (20 May 2023 05:22)  HR: 91 (20 May 2023 05:22) (62 - 91)  BP: 175/74 (20 May 2023 05:22) (143/74 - 175/74)  BP(mean): --  ABP: --  ABP(mean): --  RR: 18 (20 May 2023 05:22) (18 - 18)  SpO2: 96% (20 May 2023 05:22) (96% - 100%)    O2 Parameters below as of 20 May 2023 05:22  Patient On (Oxygen Delivery Method): nasal cannula                 Input and Output:  I&O's Detail    18 May 2023 07:01  -  19 May 2023 07:00  --------------------------------------------------------  IN:  Total IN: 0 mL    OUT:    Other (mL): 1310 mL    Other (mL): 1500 mL  Total OUT: 2810 mL    Total NET: -2810 mL          Lab Data                        9.7    12.09 )-----------( 299      ( 19 May 2023 12:12 )             30.6     05-19    133<L>  |  102  |  24<H>  ----------------------------<  170<H>  3.5   |  29  |  3.90<H>    Ca    8.9      19 May 2023 12:12  Phos  4.8     05-18  Mg     2.2     05-18    TPro  6.4  /  Alb  2.7<L>  /  TBili  0.7  /  DBili  x   /  AST  21  /  ALT  17  /  AlkPhos  104  05-18            Review of Systems	      Objective     Physical Examination    heart s1s2  lung dc BS  head nc      Pertinent Lab findings & Imaging      Dexter:  NO   Adequate UO     I&O's Detail    18 May 2023 07:01  -  19 May 2023 07:00  --------------------------------------------------------  IN:  Total IN: 0 mL    OUT:    Other (mL): 1310 mL    Other (mL): 1500 mL  Total OUT: 2810 mL    Total NET: -2810 mL               Discussed with:     Cultures:	        Radiology

## 2023-05-20 NOTE — PROGRESS NOTE ADULT - ASSESSMENT
cugj - pn - had zosyn and vancomycin  ashd - atrial; fib - rvr - had cardizem  ashd- s/p mi  s/p cabg  pvd- s/p bypass  left pl effusion- thoracentasis ?  dm2  esrd - on hd  atrial fib - on cardizem drip- rate controlled - may need oac - to discuss with family- to continue iv cardizem 5/18  pt had thoracentasis 1100 cc fluid - pt is converted to rsr   discussed with mino reese  risks and benefits of noac- pt is started on eliquis  dc cardizem - start po cardizem 5/19  discussed with Jenni hernández  alyce- other  health care proxy 5/19

## 2023-05-20 NOTE — PROGRESS NOTE ADULT - SUBJECTIVE AND OBJECTIVE BOX
Patient is a 72y Female with a known history of :  Rapid atrial fibrillation [I48.91]    Chronic combined systolic and diastolic heart failure [I50.42]    ESRD on dialysis [N18.6]    MDD (major depressive disorder) [F32.9]    FTT (failure to thrive) in adult [R62.7]    Prophylactic measure [Z29.9]    Viral syndrome [B34.9]    Pneumonia [J18.9]    HTN (hypertension) [I10]    Pleural effusion, left [J90]      HPI:  71yo female bib ems with cough for 2 days. pt c/o dry cough, no fever, chills no sob, pt states she got dialysis yesterday, +smoking hx no other complaintsRVP positive for enterovirus /Chest xray at [facility] showed L lung opacification .In ER found to have rapid afib and seen by cardiologist Admitted  to telemetry unit for monitoring , send 3 sets of cardiac enzymes to rule out acute coronary event, obtain ECHO to evaluate LVEF, cardiology consult  ,continue current management, O2 supply, anticoagulation plan as per cardiology consult  chest CT that showed bilateral effusion more in left with compressive atelectasis vs consolidation. Admit to monitored unit for cardiac monitoring, obtain echo to evaluate LVEF, intravenous diuresis as per card consult , monitor ins/outs, monitor renal profile and electrolytes closely ,send 3 sets of enzymes, O2 supply, serial chest xrays, monitor weights and oral intake of fluids, nutritionist consult .Seen b y pulmonologist and ID consult Procalcitonin 0.9 WBC on admission 12k Tmax 99.1 This could be just viral infection causing cough but since Chest CT has questionable consolidations, will cover for CAP. Also procalcitonin is slightly high due to ESRD. Palliative care consult requested ,to discuss advance directives and complete MOLST  (17 May 2023 14:01)      REVIEW OF SYSTEMS:    CONSTITUTIONAL: No fever, weight loss, or fatigue  EYES: No eye pain, visual disturbances, or discharge  ENMT:  No difficulty hearing, tinnitus, vertigo; No sinus or throat pain  NECK: No pain or stiffness  BREASTS: No pain, masses, or nipple discharge  RESPIRATORY: No cough, wheezing, chills or hemoptysis; No shortness of breath  CARDIOVASCULAR: No chest pain, palpitations, dizziness, or leg swelling  GASTROINTESTINAL: No abdominal or epigastric pain. No nausea, vomiting, or hematemesis; No diarrhea or constipation. No melena or hematochezia.  GENITOURINARY: No dysuria, frequency, hematuria, or incontinence  NEUROLOGICAL: No headaches, memory loss, loss of strength, numbness, or tremors  SKIN: No itching, burning, rashes, or lesions   LYMPH NODES: No enlarged glands  ENDOCRINE: No heat or cold intolerance; No hair loss  MUSCULOSKELETAL: No joint pain or swelling; No muscle, back, or extremity pain  PSYCHIATRIC: No depression, anxiety, mood swings, or difficulty sleeping  HEME/LYMPH: No easy bruising, or bleeding gums  ALLERGY AND IMMUNOLOGIC: No hives or eczema    MEDICATIONS  (STANDING):  apixaban 2.5 milliGRAM(s) Oral two times a day  aspirin enteric coated 81 milliGRAM(s) Oral daily  cefTRIAXone   IVPB 1000 milliGRAM(s) IV Intermittent every 24 hours  cloNIDine 0.1 milliGRAM(s) Oral two times a day  dextrose 5%. 1000 milliLiter(s) (100 mL/Hr) IV Continuous <Continuous>  dextrose 5%. 1000 milliLiter(s) (50 mL/Hr) IV Continuous <Continuous>  dextrose 50% Injectable 12.5 Gram(s) IV Push once  dextrose 50% Injectable 25 Gram(s) IV Push once  dextrose 50% Injectable 25 Gram(s) IV Push once  diltiazem    Tablet 60 milliGRAM(s) Oral three times a day  dronabinol 2.5 milliGRAM(s) Oral two times a day before meals  glucagon  Injectable 1 milliGRAM(s) IntraMuscular once  imipramine 50 milliGRAM(s) Oral daily  insulin lispro (ADMELOG) corrective regimen sliding scale   SubCutaneous three times a day before meals  metoprolol tartrate 100 milliGRAM(s) Oral two times a day  mirtazapine 7.5 milliGRAM(s) Oral at bedtime  multivitamin 1 Tablet(s) Oral daily  pantoprazole    Tablet 40 milliGRAM(s) Oral before breakfast  senna 2 Tablet(s) Oral at bedtime    MEDICATIONS  (PRN):  acetaminophen     Tablet .. 650 milliGRAM(s) Oral every 6 hours PRN Temp greater or equal to 38C (100.4F), Mild Pain (1 - 3)  aluminum hydroxide/magnesium hydroxide/simethicone Suspension 30 milliLiter(s) Oral every 4 hours PRN Dyspepsia  dextrose Oral Gel 15 Gram(s) Oral once PRN Blood Glucose LESS THAN 70 milliGRAM(s)/deciliter  ondansetron Injectable 4 milliGRAM(s) IV Push every 8 hours PRN Nausea and/or Vomiting      ALLERGIES: No Known Drug Allergies  latex (Hives)      FAMILY HISTORY:  FH: myocardial infarction (Father)    FH: myocardial infarction (Father)    Family history of type 2 diabetes mellitus in brother (Sibling)        PHYSICAL EXAMINATION:  -----------------------------  T(C): 36.8 (05-20-23 @ 05:22), Max: 36.8 (05-20-23 @ 05:22)  HR: 91 (05-20-23 @ 05:22) (62 - 91)  BP: 175/74 (05-20-23 @ 05:22) (143/74 - 175/74)  RR: 18 (05-20-23 @ 05:22) (18 - 18)  SpO2: 96% (05-20-23 @ 05:22) (96% - 100%)  Wt(kg): --    05-18 @ 07:01  -  05-19 @ 07:00  --------------------------------------------------------  IN:  Total IN: 0 mL    OUT:    Other (mL): 1310 mL    Other (mL): 1500 mL  Total OUT: 2810 mL    Total NET: -2810 mL            VITALS  T(C): 36.8 (05-20-23 @ 05:22), Max: 36.8 (05-20-23 @ 05:22)  HR: 91 (05-20-23 @ 05:22) (62 - 91)  BP: 175/74 (05-20-23 @ 05:22) (143/74 - 175/74)  RR: 18 (05-20-23 @ 05:22) (18 - 18)  SpO2: 96% (05-20-23 @ 05:22) (96% - 100%)    Constitutional: well developed, normal appearance, well groomed, well nourished, no deformities and no acute distress.   Eyes: the conjunctiva exhibited no abnormalities and the eyelids demonstrated no xanthelasmas.   HEENT: normal oral mucosa, no oral pallor and no oral cyanosis.   Neck: normal jugular venous A waves present, normal jugular venous V waves present and no jugular venous wilson A waves.   Pulmonary: no respiratory distress, normal respiratory rhythm and effort, no accessory muscle use and lungs were clear to auscultation bilaterally.   Cardiovascular: heart rate and rhythm were normal, normal S1 and S2 and no murmur, gallop, rub, heave or thrill are present.   Abdomen: soft, non-tender, no hepato-splenomegaly and no abdominal mass palpated.   Musculoskeletal: the gait could not be assessed..   Extremities: no clubbing of the fingernails, no localized cyanosis, no petechial hemorrhages and no ischemic changes.   Skin: normal skin color and pigmentation, no rash, no venous stasis, no skin lesions, no skin ulcer and no xanthoma was observed.   Psychiatric: oriented to person, place, and time, the affect was normal, the mood was normal and not feeling anxious.     LABS:   --------  05-19    133<L>  |  102  |  24<H>  ----------------------------<  170<H>  3.5   |  29  |  3.90<H>    Ca    8.9      19 May 2023 12:12                           9.7    12.09 )-----------( 299      ( 19 May 2023 12:12 )             30.6             Culture Results:   No growth (05-18 @ 13:10)  Culture Results:   Testing in progress (05-18 @ 13:10)      RADIOLOGY:  -----------------    ECG:     ECHO:

## 2023-05-20 NOTE — PROGRESS NOTE ADULT - SUBJECTIVE AND OBJECTIVE BOX
CHIEF COMPLAINT/ REASON FOR VISIT  .. Patient was seen to address the  issue listed under PROBLEM LIST which is located toward bottom of this note     SHILO KOHLER    PL[facility] TELE 309 D1    Allergies    No Known Drug Allergies  latex (Hives)    Intolerances        PAST MEDICAL & SURGICAL HISTORY:  HTN (hypertension)      h/o Anxiety attack      Depression      h/o Myocardial infarct 2007      h/o Hepatitis A 1969  currently resolved, no symptoms      Murmur, cardiac      h/o Smoking  quitted 3/2012      Anemia secondary to renal failure      ESRD on dialysis      Falls      Ataxia      Type 2 diabetes mellitus      Peripheral vascular disease, unspecified      CAD (coronary artery disease)      Diabetes      coronary stent 2007      s/p Ovarian cyst removal      s/p surgical removal of benign Skin lesion epigastric area      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          FAMILY HISTORY:  FH: myocardial infarction (Father)    FH: myocardial infarction (Father)    Family history of type 2 diabetes mellitus in brother (Sibling)        Home Medications:  acetaminophen 325 mg oral tablet: 2 tab(s) orally every 6 hours, As needed, Temp greater or equal to 38C (100.4F), Mild Pain (1 - 3) (17 May 2023 14:45)  Admelog SoloStar 100 units/mL injectable solution: injectable 3 times a day (before meals)sliding scale  (17 May 2023 14:45)  aspirin 81 mg oral delayed release tablet: 1 tab(s) orally once a day (at bedtime) (17 May 2023 14:45)  atorvastatin 40 mg oral tablet: 1 tab(s) orally once a day (at bedtime) (17 May 2023 14:45)  Basaglar KwikPen 100 units/mL subcutaneous solution: 18 unit(s) subcutaneous once a day (at bedtime) (17 May 2023 14:42)  cloNIDine 0.1 mg oral tablet: 1 tab(s) orally 2 times a day (17 May 2023 14:45)  gabapentin 100 mg oral capsule: 1 cap(s) orally once a day (at bedtime) (17 May 2023 14:41)  imipramine 50 mg oral tablet: 1 tab(s) orally once a day (17 May 2023 14:45)  Lokelma 10 g oral powder for reconstitution: 10 gram(s) orally 2 times a week on Wed and Sat  (17 May 2023 14:45)  metoprolol tartrate 100 mg oral tablet: 1 tab(s) orally once a day (at bedtime) (17 May 2023 14:45)  Mucinex 600 mg oral tablet, extended release: 1 tab(s) orally 2 times a day (17 May 2023 14:43)  Nephro-Alfie oral tablet: 1 tab(s) orally once a day (17 May 2023 14:45)  PANTOPRAZOLE 40MG DR TAB: 1 tab(s) orally once a day (17 May 2023 14:45)      MEDICATIONS  (STANDING):  apixaban 2.5 milliGRAM(s) Oral two times a day  aspirin enteric coated 81 milliGRAM(s) Oral daily  cefTRIAXone   IVPB 1000 milliGRAM(s) IV Intermittent every 24 hours  cloNIDine 0.1 milliGRAM(s) Oral two times a day  dextrose 5%. 1000 milliLiter(s) (50 mL/Hr) IV Continuous <Continuous>  dextrose 5%. 1000 milliLiter(s) (100 mL/Hr) IV Continuous <Continuous>  dextrose 50% Injectable 12.5 Gram(s) IV Push once  dextrose 50% Injectable 25 Gram(s) IV Push once  dextrose 50% Injectable 25 Gram(s) IV Push once  diltiazem    Tablet 60 milliGRAM(s) Oral three times a day  dronabinol 2.5 milliGRAM(s) Oral two times a day before meals  glucagon  Injectable 1 milliGRAM(s) IntraMuscular once  imipramine 50 milliGRAM(s) Oral daily  insulin lispro (ADMELOG) corrective regimen sliding scale   SubCutaneous three times a day before meals  metoprolol tartrate 100 milliGRAM(s) Oral two times a day  mirtazapine 7.5 milliGRAM(s) Oral at bedtime  multivitamin 1 Tablet(s) Oral daily  pantoprazole    Tablet 40 milliGRAM(s) Oral before breakfast  senna 2 Tablet(s) Oral at bedtime    MEDICATIONS  (PRN):  acetaminophen     Tablet .. 650 milliGRAM(s) Oral every 6 hours PRN Temp greater or equal to 38C (100.4F), Mild Pain (1 - 3)  aluminum hydroxide/magnesium hydroxide/simethicone Suspension 30 milliLiter(s) Oral every 4 hours PRN Dyspepsia  dextrose Oral Gel 15 Gram(s) Oral once PRN Blood Glucose LESS THAN 70 milliGRAM(s)/deciliter  ondansetron Injectable 4 milliGRAM(s) IV Push every 8 hours PRN Nausea and/or Vomiting      Diet, Consistent Carbohydrate w/Evening Snack:   For patients receiving Renal Replacement - No Protein Restr, No Conc K, No Conc Phos, Low Sodium (RENAL)  Supplement Feeding Modality:  Oral  Nepro Cans or Servings Per Day:  1       Frequency:  Three Times a day (05-18-23 @ 11:26) [Active]          Vital Signs Last 24 Hrs  T(C): 36.8 (20 May 2023 05:22), Max: 36.8 (20 May 2023 05:22)  T(F): 98.2 (20 May 2023 05:22), Max: 98.2 (20 May 2023 05:22)  HR: 91 (20 May 2023 05:22) (62 - 91)  BP: 175/74 (20 May 2023 05:22) (143/74 - 175/74)  BP(mean): --  RR: 18 (20 May 2023 05:22) (18 - 18)  SpO2: 96% (20 May 2023 05:22) (96% - 100%)    Parameters below as of 20 May 2023 05:22  Patient On (Oxygen Delivery Method): nasal cannula                  LABS:                        10.3   13.77 )-----------( 317      ( 20 May 2023 07:52 )             34.6     05-20    136  |  101  |  34<H>  ----------------------------<  199<H>  3.7   |  32<H>  |  5.30<H>    Ca    9.3      20 May 2023 08:59                WBC:  WBC Count: 13.77 K/uL (05-20 @ 07:52)  WBC Count: 12.09 K/uL (05-19 @ 12:12)  WBC Count: 15.50 K/uL (05-18 @ 06:30)  WBC Count: 12.18 K/uL (05-17 @ 09:40)      MICROBIOLOGY:  RECENT CULTURES:  05-18 Pleural Fl Pleural Fluid XXXX   polymorphonuclear leukocytes seen  No organisms seen  by cytocentrifuge   No growth    05-17 .Blood Blood-Peripheral XXXX XXXX   No growth to date.    05-17 .Blood Blood-Peripheral XXXX XXXX   No growth to date.                    Sodium:  Sodium, Serum: 136 mmol/L (05-20 @ 08:59)  Sodium, Serum: 133 mmol/L (05-19 @ 12:12)  Sodium, Serum: 134 mmol/L (05-18 @ 06:30)  Sodium, Serum: 133 mmol/L (05-17 @ 09:40)      5.30 mg/dL 05-20 @ 08:59  3.90 mg/dL 05-19 @ 12:12  5.40 mg/dL 05-18 @ 06:30  4.60 mg/dL 05-17 @ 09:40      Hemoglobin:  Hemoglobin: 10.3 g/dL (05-20 @ 07:52)  Hemoglobin: 9.7 g/dL (05-19 @ 12:12)  Hemoglobin: 10.6 g/dL (05-18 @ 06:30)  Hemoglobin: 10.5 g/dL (05-17 @ 09:40)      Platelets: Platelet Count - Automated: 317 K/uL (05-20 @ 07:52)  Platelet Count - Automated: 299 K/uL (05-19 @ 12:12)  Platelet Count - Automated: 296 K/uL (05-18 @ 06:30)  Platelet Count - Automated: 354 K/uL (05-17 @ 09:40)              RADIOLOGY & ADDITIONAL STUDIES:      MICROBIOLOGY:  RECENT CULTURES:  05-18 Pleural Fl Pleural Fluid XXXX   polymorphonuclear leukocytes seen  No organisms seen  by cytocentrifuge   No growth    05-17 .Blood Blood-Peripheral XXXX XXXX   No growth to date.    05-17 .Blood Blood-Peripheral XXXX XXXX   No growth to date.

## 2023-05-20 NOTE — PROGRESS NOTE ADULT - ASSESSMENT
73yo female bib ems with cough for 2 days. pt c/o dry cough, no fever, chills no sob, pt states she got dialysis yesterday, +smoking hx no other complaintsRVP positive for enterovirus /Chest xray at [facility] showed L lung opacification .In ER found to have rapid afib and seen by cardiologist Admitted  to telemetry unit for monitoring , send 3 sets of cardiac enzymes to rule out acute coronary event, obtain ECHO to evaluate LVEF, cardiology consult  ,continue current management, O2 supply, anticoagulation plan as per cardiology consult  chest CT that showed bilateral effusion more in left with compressive atelectasis vs consolidation. Admit to monitored unit for cardiac monitoring, obtain echo to evaluate LVEF, intravenous diuresis as per card consult , monitor ins/outs, monitor renal profile and electrolytes closely ,send 3 sets of enzymes, O2 supply, serial chest xrays, monitor weights and oral intake of fluids, nutritionist consult .Seen b y pulmonologist and ID consult Procalcitonin 0.9 WBC on admission 12k Tmax 99.1 This could be just viral infection causing cough but since Chest CT has questionable consolidations, will cover for CAP. Also procalcitonin is slightly high due to ESRD. Palliative care consult requested ,to discuss advance directives and complete MOLST

## 2023-05-20 NOTE — PROGRESS NOTE ADULT - SUBJECTIVE AND OBJECTIVE BOX
PROGRESS NOTE  Patient is a 72y old  Female who presents with a chief complaint of ABNORMAL XRAY OF CHEST (20 May 2023 11:20)  Chart and available morning labs /imaging are reviewed electronically , urgent issues addressed . More information  is being added upon completion of rounds , when more information is collected and management discussed with consultants , medical staff and social service/case management on the floor     OVERNIGHT  No new issues reported by medical staff . All above noted Patient is resting in a bed comfortably .Confused ,poor mentation .No distress noted     HPI:  71yo female bib ems with cough for 2 days. pt c/o dry cough, no fever, chills no sob, pt states she got dialysis yesterday, +smoking hx no other complaintsRVP positive for enterovirus /Chest [facility] at [facility] showed L lung opacification .In ER found to have rapid afib and seen by cardiologist Admitted  to telemetry unit for monitoring , send 3 sets of cardiac enzymes to rule out acute coronary event, obtain ECHO to evaluate LVEF, cardiology consult  ,continue current management, O2 supply, anticoagulation plan as per cardiology consult  chest CT that showed bilateral effusion more in left with compressive atelectasis vs consolidation. Admit to monitored unit for cardiac monitoring, obtain echo to evaluate LVEF, intravenous diuresis as per card consult , monitor ins/outs, monitor renal profile and electrolytes closely ,send 3 sets of enzymes, O2 supply, serial chest xrays, monitor weights and oral intake of fluids, nutritionist consult .Seen b y pulmonologist and ID consult Procalcitonin 0.9 WBC on admission 12k Tmax 99.1 This could be just viral infection causing cough but since Chest CT has questionable consolidations, will cover for CAP. Also procalcitonin is slightly high due to ESRD. Palliative care consult requested ,to discuss advance directives and complete MOLST  (17 May 2023 14:01)    PAST MEDICAL & SURGICAL HISTORY:  HTN (hypertension)      h/o Anxiety attack      Depression      h/o Myocardial infarct 2007      h/o Hepatitis A 1969  currently resolved, no symptoms      Murmur, cardiac      h/o Smoking  quitted 3/2012      Anemia secondary to renal failure      ESRD on dialysis      Falls      Ataxia      Type 2 diabetes mellitus      Peripheral vascular disease, unspecified      CAD (coronary artery disease)      Diabetes      coronary stent 2007      s/p Ovarian cyst removal      s/p surgical removal of benign Skin lesion epigastric area      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          MEDICATIONS  (STANDING):  apixaban 2.5 milliGRAM(s) Oral two times a day  aspirin enteric coated 81 milliGRAM(s) Oral daily  cefTRIAXone   IVPB 1000 milliGRAM(s) IV Intermittent every 24 hours  cloNIDine 0.1 milliGRAM(s) Oral two times a day  dextrose 5%. 1000 milliLiter(s) (50 mL/Hr) IV Continuous <Continuous>  dextrose 5%. 1000 milliLiter(s) (100 mL/Hr) IV Continuous <Continuous>  dextrose 50% Injectable 12.5 Gram(s) IV Push once  dextrose 50% Injectable 25 Gram(s) IV Push once  dextrose 50% Injectable 25 Gram(s) IV Push once  diltiazem    Tablet 60 milliGRAM(s) Oral three times a day  dronabinol 2.5 milliGRAM(s) Oral two times a day before meals  glucagon  Injectable 1 milliGRAM(s) IntraMuscular once  imipramine 50 milliGRAM(s) Oral daily  insulin lispro (ADMELOG) corrective regimen sliding scale   SubCutaneous three times a day before meals  metoprolol tartrate 100 milliGRAM(s) Oral two times a day  mirtazapine 7.5 milliGRAM(s) Oral at bedtime  multivitamin 1 Tablet(s) Oral daily  pantoprazole    Tablet 40 milliGRAM(s) Oral before breakfast  senna 2 Tablet(s) Oral at bedtime    MEDICATIONS  (PRN):  acetaminophen     Tablet .. 650 milliGRAM(s) Oral every 6 hours PRN Temp greater or equal to 38C (100.4F), Mild Pain (1 - 3)  aluminum hydroxide/magnesium hydroxide/simethicone Suspension 30 milliLiter(s) Oral every 4 hours PRN Dyspepsia  dextrose Oral Gel 15 Gram(s) Oral once PRN Blood Glucose LESS THAN 70 milliGRAM(s)/deciliter  ondansetron Injectable 4 milliGRAM(s) IV Push every 8 hours PRN Nausea and/or Vomiting      OBJECTIVE    T(C): 36.7 (05-20-23 @ 14:26), Max: 36.9 (05-20-23 @ 10:00)  HR: 79 (05-20-23 @ 14:26) (67 - 91)  BP: 147/72 (05-20-23 @ 14:26) (104/54 - 175/74)  RR: 18 (05-20-23 @ 14:26) (18 - 18)  SpO2: 97% (05-20-23 @ 14:26) (96% - 100%)  Wt(kg): --  I&O's Summary    20 May 2023 07:01  -  20 May 2023 14:30  --------------------------------------------------------  IN: 0 mL / OUT: 1500 mL / NET: -1500 mL          REVIEW OF SYSTEMS:  CONSTITUTIONAL: No fever, weight loss, or fatigue  EYES: No eye pain, visual disturbances, or discharge  ENMT:   No sinus or throat pain  NECK: No pain or stiffness  RESPIRATORY: No cough, wheezing, chills or hemoptysis; No shortness of breath  CARDIOVASCULAR: No chest pain, palpitations, dizziness, or leg swelling  GASTROINTESTINAL: No abdominal pain. No nausea, vomiting; No diarrhea or constipation. No melena or hematochezia.  GENITOURINARY: No dysuria, frequency, hematuria, or incontinence  NEUROLOGICAL: No headaches, memory loss, loss of strength, numbness, or tremors  SKIN: No itching, burning, rashes, or lesions   MUSCULOSKELETAL: No joint pain or swelling; No muscle, back, or extremity pain    PHYSICAL EXAM:  Appearance: NAD. VS past 24 hrs -as above   HEENT:   Moist oral mucosa. Conjunctiva clear b/l.   Neck : supple  Respiratory: Lungs CTAB.  Gastrointestinal:  Soft, nontender. No rebound. No rigidity. BS present	  Cardiovascular: RRR ,S1S2 present  Neurologic: Non-focal. Moving all extremities.  Extremities: No edema. No erythema. No calf tenderness.  Skin: No rashes, No ecchymoses, No cyanosis.	  wounds ,skin lesions-See skin assesment flow sheet   LABS:                        10.3   13.77 )-----------( 317      ( 20 May 2023 07:52 )             34.6     05-20    136  |  101  |  34<H>  ----------------------------<  199<H>  3.7   |  32<H>  |  5.30<H>    Ca    9.3      20 May 2023 08:59      CAPILLARY BLOOD GLUCOSE      POCT Blood Glucose.: 112 mg/dL (20 May 2023 13:41)  POCT Blood Glucose.: 218 mg/dL (20 May 2023 07:58)  POCT Blood Glucose.: 193 mg/dL (19 May 2023 21:20)  POCT Blood Glucose.: 151 mg/dL (19 May 2023 18:03)  POCT Blood Glucose.: 187 mg/dL (19 May 2023 16:27)          Culture - Fungal, Body Fluid (collected 18 May 2023 13:10)  Source: Pleural Fl Pleural Fluid  Preliminary Report (19 May 2023 07:39):    Testing in progress    Culture - Body Fluid with Gram Stain (collected 18 May 2023 13:10)  Source: Pleural Fl Pleural Fluid  Gram Stain (18 May 2023 22:17):    polymorphonuclear leukocytes seen    No organisms seen    by cytocentrifuge  Preliminary Report (19 May 2023 16:08):    No growth    Culture - Acid Fast - Body Fluid w/Smear (collected 18 May 2023 13:10)  Source: Pleural Fl Pleural Fluid    Culture - Blood (collected 17 May 2023 09:42)  Source: .Blood Blood-Peripheral  Preliminary Report (18 May 2023 18:02):    No growth to date.    Culture - Blood (collected 17 May 2023 09:40)  Source: .Blood Blood-Peripheral  Preliminary Report (18 May 2023 18:02):    No growth to date.      RADIOLOGY & ADDITIONAL TESTS:   reviewed elctronically  ASSESSMENT/PLAN: 	    25 minutes aggregate time was spent on this visit, 50% visit time spent in care co-ordination with other attendings and counselling patient .I have discussed care plan with patient / HCP/family member ,who expressed understanding of problems treatment and their effect and side effects, alternatives in details. I have asked if they have any questions and concerns and appropriately addressed them to best of my ability.

## 2023-05-20 NOTE — PROGRESS NOTE ADULT - ASSESSMENT
73yo female bib ems with cough for 2 days. pt c/o dry cough, no fever, chills no sob, pt states she got dialysis yesterday, +smoking hx no other complaints RVP positive for enterovirus /Chest xray at [facility] showed L lung opacification    pleural eff  atelectasis  esrd  anemia  OP  OA  URI  HTN  CAD  Smoker    SLP note reviewed  PO diet  on ABX  vs noted    pt is DNR DNI  no feeding tube  ok for Dialysis  MOLST filled out - signed - updated

## 2023-05-20 NOTE — PROGRESS NOTE ADULT - ASSESSMENT
REASON FOR VISIT  .. Management of problems listed below      NOTEWORTHY FINDINGS.     PHYSICAL EXAM    HEENT Unremarkable  atraumatic   RESP Fair air entry  Harsh breath sound   CARDIAC S1 S2 No S3     NO JVD    ABDOMEN No hepatosplenomegaly   PEDAL EDEMA present No calf tenderness  NO rash       BEST PRACTICE ISSUES.    HOB ELEVATN.   .. Yes  DVT PPLX.   .. 5/19/2023 apixaba 2.5 x 2 (af)   ..    5/17-5/19/2023 HPSC   ELDER PPLX.   ..    5/17/2023 PROTONIX 40   INFN PPLX.   ..    SP SW AMINAH.    ..  5/19/2023 aminah npo       DIET.    ..  5/17/2023 RENAL   IV fl.  ..      PROCEDURES.  .. 5/18/2023 l thoracentesis 1.5l removd sharan colored   PAST PROCEDURES.    ..     GENERAL DATA .   GOC.    ..    5/19/2023 dnr   ALLGY.  ..    latex                     WT.  ..  5/17/2023 59  BMI.  ..   5/17/2023 19                 ICU STAY.   .. none    DRIPS.  .. CARD 5/- 5/19     ABGS.    VS/ PO/IO/ VENT/ DRIPS.  5/20/2023 afeb 67 100/50   5/20/2023 3l 97%      PROBLEM/ASSESSMENT/PLAN.  PULM.  . GAS EXCHANGE.  .. Target po 90-95%   . PLEURAL EFFUSION.   . 5/18/2023 s prot 6.4   .. cxr cw 3/7/2023 new sm to mod l effsn   .. ct ch nc 5/17/2023 ct ch  .... Sm to mod r effs  .... mod l pl effs with atelectasis consoliatn l lo lobe   .... ground glass and airspace consolidn post segment chaz   .. 5/17/2023 Pl effsn may be sec to esrd chf or parapneumonia   .. cxr 5/20/2023 improvd l aeratn   .. effusn likely sec to esrd  . PLEURAL FLUID ANALYSIS.  .. 5/18/2023 l thorac 1.5 l   ..5/18 pl fl l 13 m 73 p 5 afb sm (-) g 131 l 102/268 (.38) ph 7.6 pr 2.6/6.4 (.4)  .. Fluid transudate    INFECTION.  . ENTERORHINOVIRUS INFECTION RESP TRACT .  . PNEUMONIA.    .. W 5/17-5/18-5/19/2023 w 12.1-15.5 - 12   .. pr 5/17/2023 pr .9   .. rvp 5/17/2023 enterorhinovirus   .. 5/18 pl fl c (-)   . 5/17 bc (-)   .. 5/17/2023 ROCEPHIN x 5d Dr CHEEK    .. Manage viral infection with supp care   .. Empiric antibio started by id to cover superimposed bact pneumonia cap or aspiration in setting of esrd    CARDIAC.  .. HYTN.  .. 5/17/2023 CLONIDINE .1 X 2   . CAD.  .. tr 5/17/2023 tr 56  .. 5/17/2023 ASA 81   .. 5/17/2023 ATORVASTAT 40   . A FIB RVR 5/17/2023   .. 5/19/2023 CARDIZEM 60.3   .. 5/17-5/19/2023 4P CARDIZEM DRIP 125  5/H  .. 5/17/2023 METOPROLOL 100.2   .. 5/19/2023 apixaba 2.5 x 2 (af) Dr Yun   . CHF.  .. bnp bnp 175k     HEMAT.  . ANEMIA  .. Hb 5/17-5/18-5/19/2023 Hb 10.5- 10.6 - 9.7   .. inr 5/17/2023 inr 125     GI.  . DYSPHAGIA     RENAL.  . ESRD  .. Na 5/17/2023 na 133   .. cr 5/17/2023 cr 4.6   .. HD     NEURO.  .. 5/17/2023 GABAPENTIN 100  .. 5/17/2023 IMIPRAMINE 50   .. 5/17/2023 RISPERIDAL .5       OVERALL.  . 72-year-old female former smoker with history of A-fib (not on ac sec fall risk, diabetes, hypertension, hyperlipidemia, ESRD hemodialysis, CHF, CAD/CABG, PVD was sent from [facility] 5/17/2023 with cough for 2 days. pt c/o dry cough, no fever, chills no sob, pt states she got dialysis 5/16/2023 ,   .. Pt was recently admitted 3/7-3/11/2023 and before that 2/22-2/25/2023   .. 5/17/2023 No antibio use last 3 m   .. Pt admitted 5/17/2023 Pulm consulted     COURSE   . Found to have enterorhinovirus and large l effs  . 5/18/2023 l thora done      ACTIVE PROBLEMS .  . ENTERORHINOVIRUS INFECTION RESP TRACT 5/17  . PLEURAL EFFUSION SP l THORA 5/18 TRANSUDATE   . PNEUMONIA 5/17/2023  .... 5/17/2023 JOSE ALEJANDRO CHEEK   . AF RVR 5/17-5/19/2023 Cardizem drip  5/19 apixaba  . DYSPHAGIA 5/19/2023   . ESRD HD     OTHER PROBLEMS.  . CAD.  . HYTN.   . CHF.  . ESRD.      PMH.   CAD.  .. HISTORY ho cabg  A fib  .. 3/7/2023 Not on ac sec multiple falls  PAD  .. sp R-> L bifem - fem BK pop bypass   .. Fem pop bypass 6/21/2022   .. Partial ray amputation r great toe 6/22/2022   ESRD   .. On HD     TIME SPENT   Over 25 minutes aggregate care time spent on encounter; activities included   direct patient care, counseling and/or coordinating care reviewing notes, lab data/ imaging , discussion with multidisciplinary team/ patient  /family and explaining in detail risks, benefits, alternatives  of the recommendations     JOSE F LAO 72 f 5/17/2023 1948 DR HARRY ALBRIGHT

## 2023-05-20 NOTE — PROGRESS NOTE ADULT - SUBJECTIVE AND OBJECTIVE BOX
Patient is a 72y Female whom presented to the hospital with esrd on hd     PAST MEDICAL & SURGICAL HISTORY:  HTN (hypertension)      h/o Anxiety attack      Depression      h/o Myocardial infarct 2007      h/o Hepatitis A 1969  currently resolved, no symptoms      Murmur, cardiac      h/o Smoking  quitted 3/2012      Anemia secondary to renal failure      ESRD on dialysis      Falls      Ataxia      Type 2 diabetes mellitus      Peripheral vascular disease, unspecified      CAD (coronary artery disease)      Diabetes      coronary stent 2007      s/p Ovarian cyst removal      s/p surgical removal of benign Skin lesion epigastric area      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          MEDICATIONS  (STANDING):  aspirin enteric coated 81 milliGRAM(s) Oral daily  atorvastatin 40 milliGRAM(s) Oral at bedtime  cefTRIAXone   IVPB 1000 milliGRAM(s) IV Intermittent every 24 hours  cloNIDine 0.1 milliGRAM(s) Oral two times a day  dextrose 5%. 1000 milliLiter(s) (50 mL/Hr) IV Continuous <Continuous>  dextrose 5%. 1000 milliLiter(s) (100 mL/Hr) IV Continuous <Continuous>  dextrose 50% Injectable 12.5 Gram(s) IV Push once  dextrose 50% Injectable 25 Gram(s) IV Push once  dextrose 50% Injectable 25 Gram(s) IV Push once  diltiazem Infusion 5 mG/Hr (5 mL/Hr) IV Continuous <Continuous>  gabapentin 100 milliGRAM(s) Oral at bedtime  glucagon  Injectable 1 milliGRAM(s) IntraMuscular once  heparin   Injectable 5000 Unit(s) SubCutaneous every 12 hours  imipramine 50 milliGRAM(s) Oral daily  insulin lispro (ADMELOG) corrective regimen sliding scale   SubCutaneous three times a day before meals  melatonin 3 milliGRAM(s) Oral at bedtime  metoprolol tartrate 100 milliGRAM(s) Oral two times a day  multivitamin 1 Tablet(s) Oral daily  pantoprazole    Tablet 40 milliGRAM(s) Oral before breakfast  risperiDONE   Tablet 0.5 milliGRAM(s) Oral at bedtime  senna 2 Tablet(s) Oral at bedtime      Allergies    No Known Drug Allergies  latex (Hives)    Intolerances        SOCIAL HISTORY:  Denies ETOh,Smoking,     FAMILY HISTORY:  FH: myocardial infarction (Father)    FH: myocardial infarction (Father)    Family history of type 2 diabetes mellitus in brother (Sibling)        REVIEW OF SYSTEMS:    CONSTITUTIONAL: No weakness, fevers or chills  EYES/ENT: No visual changes;  no throat pain   NECK: No pain or stiffness  RESPIRATORY: No cough, wheezing, hemoptysis; No shortness of breath  CARDIOVASCULAR: No chest pain or palpitations  GASTROINTESTINAL: No abdominal or epigastric pain. No nausea, vomiting,     No diarrhea or constipation. No melena   GENITOURINARY: No dysuria, frequency or hematuria  NEUROLOGICAL: No numbness or weakness  SKIN: dry                                                    10.3   13.77 )-----------( 317      ( 20 May 2023 07:52 )             34.6       CBC Full  -  ( 20 May 2023 07:52 )  WBC Count : 13.77 K/uL  RBC Count : 3.35 M/uL  Hemoglobin : 10.3 g/dL  Hematocrit : 34.6 %  Platelet Count - Automated : 317 K/uL  Mean Cell Volume : 103.3 fl  Mean Cell Hemoglobin : 30.7 pg  Mean Cell Hemoglobin Concentration : 29.8 gm/dL  Auto Neutrophil # : x  Auto Lymphocyte # : x  Auto Monocyte # : x  Auto Eosinophil # : x  Auto Basophil # : x  Auto Neutrophil % : x  Auto Lymphocyte % : x  Auto Monocyte % : x  Auto Eosinophil % : x  Auto Basophil % : x      05-20    136  |  101  |  34<H>  ----------------------------<  199<H>  3.7   |  32<H>  |  5.30<H>    Ca    9.3      20 May 2023 08:59        CAPILLARY BLOOD GLUCOSE      POCT Blood Glucose.: 112 mg/dL (20 May 2023 13:41)  POCT Blood Glucose.: 218 mg/dL (20 May 2023 07:58)  POCT Blood Glucose.: 193 mg/dL (19 May 2023 21:20)  POCT Blood Glucose.: 151 mg/dL (19 May 2023 18:03)      Vital Signs Last 24 Hrs  T(C): 36.7 (20 May 2023 14:26), Max: 36.9 (20 May 2023 10:00)  T(F): 98 (20 May 2023 14:26), Max: 98.4 (20 May 2023 10:00)  HR: 79 (20 May 2023 14:26) (67 - 91)  BP: 147/72 (20 May 2023 14:26) (104/54 - 175/74)  BP(mean): --  RR: 18 (20 May 2023 14:26) (18 - 18)  SpO2: 97% (20 May 2023 14:26) (96% - 100%)    Parameters below as of 20 May 2023 14:26  Patient On (Oxygen Delivery Method): nasal cannula  O2 Flow (L/min): 3                      PHYSICAL EXAM:    Constitutional: NAD  HEENT: conjunctive   clear   Neck:  No JVD  Respiratory: CTAB  Cardiovascular: S1 and S2  Gastrointestinal: BS+, soft, NT/ND  Extremities: No peripheral edema  Neurological:  no focal deficits  Psychiatric: Normal mood, normal affect  : No Renteria  Skin: No rashes  Access: Not applicable

## 2023-05-21 DIAGNOSIS — R13.10 DYSPHAGIA, UNSPECIFIED: ICD-10-CM

## 2023-05-21 LAB
ADENOSINE DEAMINASE PLR-CCNC: 4 U/L — SIGNIFICANT CHANGE UP (ref 0–30)
ANION GAP SERPL CALC-SCNC: 6 MMOL/L — SIGNIFICANT CHANGE UP (ref 5–17)
BUN SERPL-MCNC: 22 MG/DL — SIGNIFICANT CHANGE UP (ref 7–23)
CALCIUM SERPL-MCNC: 8.4 MG/DL — LOW (ref 8.5–10.1)
CHLORIDE SERPL-SCNC: 99 MMOL/L — SIGNIFICANT CHANGE UP (ref 96–108)
CO2 SERPL-SCNC: 29 MMOL/L — SIGNIFICANT CHANGE UP (ref 22–31)
CREAT SERPL-MCNC: 3.5 MG/DL — HIGH (ref 0.5–1.3)
EGFR: 13 ML/MIN/1.73M2 — LOW
GLUCOSE SERPL-MCNC: 307 MG/DL — HIGH (ref 70–99)
HCT VFR BLD CALC: 31.4 % — LOW (ref 34.5–45)
HGB BLD-MCNC: 9.6 G/DL — LOW (ref 11.5–15.5)
MCHC RBC-ENTMCNC: 30.4 PG — SIGNIFICANT CHANGE UP (ref 27–34)
MCHC RBC-ENTMCNC: 30.6 GM/DL — LOW (ref 32–36)
MCV RBC AUTO: 99.4 FL — SIGNIFICANT CHANGE UP (ref 80–100)
NRBC # BLD: 0 /100 WBCS — SIGNIFICANT CHANGE UP (ref 0–0)
PLATELET # BLD AUTO: 355 K/UL — SIGNIFICANT CHANGE UP (ref 150–400)
POTASSIUM SERPL-MCNC: 3.6 MMOL/L — SIGNIFICANT CHANGE UP (ref 3.5–5.3)
POTASSIUM SERPL-SCNC: 3.6 MMOL/L — SIGNIFICANT CHANGE UP (ref 3.5–5.3)
RBC # BLD: 3.16 M/UL — LOW (ref 3.8–5.2)
RBC # FLD: 15.4 % — HIGH (ref 10.3–14.5)
SODIUM SERPL-SCNC: 134 MMOL/L — LOW (ref 135–145)
TSH SERPL-MCNC: 1.65 UIU/ML — SIGNIFICANT CHANGE UP (ref 0.36–3.74)
WBC # BLD: 11.69 K/UL — HIGH (ref 3.8–10.5)
WBC # FLD AUTO: 11.69 K/UL — HIGH (ref 3.8–10.5)

## 2023-05-21 PROCEDURE — 99233 SBSQ HOSP IP/OBS HIGH 50: CPT

## 2023-05-21 RX ORDER — PANTOPRAZOLE SODIUM 20 MG/1
40 TABLET, DELAYED RELEASE ORAL
Refills: 0 | Status: DISCONTINUED | OUTPATIENT
Start: 2023-05-21 | End: 2023-05-24

## 2023-05-21 RX ADMIN — Medication 60 MILLIGRAM(S): at 05:33

## 2023-05-21 RX ADMIN — APIXABAN 2.5 MILLIGRAM(S): 2.5 TABLET, FILM COATED ORAL at 17:12

## 2023-05-21 RX ADMIN — Medication 60 MILLIGRAM(S): at 21:47

## 2023-05-21 RX ADMIN — Medication 50 MILLIGRAM(S): at 11:24

## 2023-05-21 RX ADMIN — Medication 0.1 MILLIGRAM(S): at 05:34

## 2023-05-21 RX ADMIN — Medication 2.5 MILLIGRAM(S): at 17:12

## 2023-05-21 RX ADMIN — APIXABAN 2.5 MILLIGRAM(S): 2.5 TABLET, FILM COATED ORAL at 05:33

## 2023-05-21 RX ADMIN — Medication 2.5 MILLIGRAM(S): at 05:56

## 2023-05-21 RX ADMIN — SENNA PLUS 2 TABLET(S): 8.6 TABLET ORAL at 21:47

## 2023-05-21 RX ADMIN — Medication 2: at 17:14

## 2023-05-21 RX ADMIN — PANTOPRAZOLE SODIUM 40 MILLIGRAM(S): 20 TABLET, DELAYED RELEASE ORAL at 05:55

## 2023-05-21 RX ADMIN — Medication 60 MILLIGRAM(S): at 13:57

## 2023-05-21 RX ADMIN — MIRTAZAPINE 7.5 MILLIGRAM(S): 45 TABLET, ORALLY DISINTEGRATING ORAL at 21:47

## 2023-05-21 RX ADMIN — Medication 81 MILLIGRAM(S): at 11:24

## 2023-05-21 RX ADMIN — Medication 3: at 11:25

## 2023-05-21 RX ADMIN — Medication 1 TABLET(S): at 11:24

## 2023-05-21 RX ADMIN — Medication 100 MILLIGRAM(S): at 05:33

## 2023-05-21 RX ADMIN — Medication 3: at 07:48

## 2023-05-21 RX ADMIN — Medication 0.1 MILLIGRAM(S): at 17:09

## 2023-05-21 RX ADMIN — Medication 100 MILLIGRAM(S): at 17:09

## 2023-05-21 NOTE — SWALLOW BEDSIDE ASSESSMENT ADULT - SWALLOW EVAL: DIAGNOSIS
Pt oral and pharyngeal swallow unable to be assessed d/t lethargy and inability to maintain level of arousal required for safe PO intake.
1. Pt p/w functional oral-pharyngeal swallow with purees, minced and moist solids and mildly thick liquids marked by bolus acceptance, bolus containment, timely AP transit, and adequate hyo-laryngeal elevation with no overt s/s of airway penetration/aspiration noted at bedside.     2. Pt p/w mild-moderate oral-pharyngeal dysphagia with soft and bite size solids marked by prolonged mastication of bolus, decreased AP movement of bolus, delayed oral transit time and delayed pharyngeal swallow with no overt s/s of airway penetration/aspiration noted at bedside.      3. Pt p/w moderate oral-pharyngeal dysphagia with thin liquids marked by suspected posterior loss, decreased laryngeal elevation, multiple swallows, cough post oral intake, and wet vocal quality post oral intake, suggestive of airway penetration/aspiration at bedside.

## 2023-05-21 NOTE — PROGRESS NOTE ADULT - ASSESSMENT
73yo female bib ems with cough for 2 days. pt c/o dry cough, no fever, chills no sob, pt states she got dialysis yesterday, +smoking hx no other complaintsRVP positive for enterovirus /Chest [facility] at [facility] showed L lung opacification .In ER found to have rapid afib and seen by cardiologist Admitted  to telemetry unit for monitoring , send 3 sets of cardiac enzymes to rule out acute coronary event, obtain ECHO to evaluate LVEF, cardiology consult  ,continue current management, O2 supply, anticoagulation plan as per cardiology consult  chest CT that showed bilateral effusion more in left with compressive atelectasis vs consolidation. Admit to monitored unit for cardiac monitoring, obtain echo to evaluate LVEF, intravenous diuresis as per card consult , monitor ins/outs, monitor renal profile and electrolytes closely ,send 3 sets of enzymes, O2 supply, serial chest xrays, monitor weights and oral intake of fluids, nutritionist consult .Seen b y pulmonologist and ID consult Procalcitonin 0.9 WBC on admission 12k Tmax 99.1 This could be just viral infection causing cough but since Chest CT has questionable consolidations, will cover for CAP. Also procalcitonin is slightly high due to ESRD. Palliative care consult requested ,to discuss advance directives and complete MOLST  (17 May 2023 14:01)        esrd on hd   Excess fluids and waste products will be removed from your blood; your electrolytes will be balanced; your blood pressure will be controlled.      ANEMIA PLAN:  Anemia of chronic disease:  Well controlled by Aranesp  H and H subtherapeutic .  We will continue Aranesp aiming for a HCT of 32-36 %.   We will monitor Iron stores, B12 and RBC folate .      BP monitoring,continue current antihypertensive meds, low salt diet,followup with PMD in 1-2 weeks  cloNIDine 0.1 milliGRAM(s) Oral two times a day

## 2023-05-21 NOTE — SWALLOW BEDSIDE ASSESSMENT ADULT - ORAL PHASE
Within functional limits suspected posterior loss Decreased anterior-posterior movement of the bolus/Delayed oral transit time/Lingual stasis

## 2023-05-21 NOTE — PROGRESS NOTE ADULT - SUBJECTIVE AND OBJECTIVE BOX
Patient is a 72y Female with a known history of :  Rapid atrial fibrillation [I48.91]    Chronic combined systolic and diastolic heart failure [I50.42]    ESRD on dialysis [N18.6]    MDD (major depressive disorder) [F32.9]    FTT (failure to thrive) in adult [R62.7]    Prophylactic measure [Z29.9]    Viral syndrome [B34.9]    Pneumonia [J18.9]    HTN (hypertension) [I10]    Pleural effusion, left [J90]    Acute metabolic encephalopathy [G93.41]      HPI:  73yo female bib ems with cough for 2 days. pt c/o dry cough, no fever, chills no sob, pt states she got dialysis yesterday, +smoking hx no other complaintsRVP positive for enterovirus /C[facility] at [facility] showed L lung opacification .In ER found to have rapid afib and seen by cardiologist Admitted  to telemetry unit for monitoring , send 3 sets of cardiac enzymes to rule out acute coronary event, obtain ECHO to evaluate LVEF, cardiology consult  ,continue current management, O2 supply, anticoagulation plan as per cardiology consult  chest CT that showed bilateral effusion more in left with compressive atelectasis vs consolidation. Admit to monitored unit for cardiac monitoring, obtain echo to evaluate LVEF, intravenous diuresis as per card consult , monitor ins/outs, monitor renal profile and electrolytes closely ,send 3 sets of enzymes, O2 supply, serial chest xrays, monitor weights and oral intake of fluids, nutritionist consult .Seen b y pulmonologist and ID consult Procalcitonin 0.9 WBC on admission 12k Tmax 99.1 This could be just viral infection causing cough but since Chest CT has questionable consolidations, will cover for CAP. Also procalcitonin is slightly high due to ESRD. Palliative care consult requested ,to discuss advance directives and complete MOLST  (17 May 2023 14:01)      REVIEW OF SYSTEMS:    CONSTITUTIONAL: No fever, weight loss, or fatigue  EYES: No eye pain, visual disturbances, or discharge  ENMT:  No difficulty hearing, tinnitus, vertigo; No sinus or throat pain  NECK: No pain or stiffness  BREASTS: No pain, masses, or nipple discharge  RESPIRATORY: No cough, wheezing, chills or hemoptysis; No shortness of breath  CARDIOVASCULAR: No chest pain, palpitations, dizziness, or leg swelling  GASTROINTESTINAL: No abdominal or epigastric pain. No nausea, vomiting, or hematemesis; No diarrhea or constipation. No melena or hematochezia.  GENITOURINARY: No dysuria, frequency, hematuria, or incontinence  NEUROLOGICAL: No headaches, memory loss, loss of strength, numbness, or tremors  SKIN: No itching, burning, rashes, or lesions   LYMPH NODES: No enlarged glands  ENDOCRINE: No heat or cold intolerance; No hair loss  MUSCULOSKELETAL: No joint pain or swelling; No muscle, back, or extremity pain  PSYCHIATRIC: No depression, anxiety, mood swings, or difficulty sleeping  HEME/LYMPH: No easy bruising, or bleeding gums  ALLERGY AND IMMUNOLOGIC: No hives or eczema    MEDICATIONS  (STANDING):  apixaban 2.5 milliGRAM(s) Oral two times a day  aspirin enteric coated 81 milliGRAM(s) Oral daily  cefTRIAXone   IVPB 1000 milliGRAM(s) IV Intermittent every 24 hours  cloNIDine 0.1 milliGRAM(s) Oral two times a day  dextrose 5%. 1000 milliLiter(s) (50 mL/Hr) IV Continuous <Continuous>  dextrose 5%. 1000 milliLiter(s) (100 mL/Hr) IV Continuous <Continuous>  dextrose 50% Injectable 12.5 Gram(s) IV Push once  dextrose 50% Injectable 25 Gram(s) IV Push once  dextrose 50% Injectable 25 Gram(s) IV Push once  diltiazem    Tablet 60 milliGRAM(s) Oral three times a day  dronabinol 2.5 milliGRAM(s) Oral two times a day before meals  glucagon  Injectable 1 milliGRAM(s) IntraMuscular once  imipramine 50 milliGRAM(s) Oral daily  insulin lispro (ADMELOG) corrective regimen sliding scale   SubCutaneous three times a day before meals  metoprolol tartrate 100 milliGRAM(s) Oral two times a day  mirtazapine 7.5 milliGRAM(s) Oral at bedtime  multivitamin 1 Tablet(s) Oral daily  pantoprazole   Suspension 40 milliGRAM(s) Oral before breakfast  senna 2 Tablet(s) Oral at bedtime  sodium chloride 0.45%. 1000 milliLiter(s) (50 mL/Hr) IV Continuous <Continuous>    MEDICATIONS  (PRN):  acetaminophen     Tablet .. 650 milliGRAM(s) Oral every 6 hours PRN Temp greater or equal to 38C (100.4F), Mild Pain (1 - 3)  aluminum hydroxide/magnesium hydroxide/simethicone Suspension 30 milliLiter(s) Oral every 4 hours PRN Dyspepsia  dextrose Oral Gel 15 Gram(s) Oral once PRN Blood Glucose LESS THAN 70 milliGRAM(s)/deciliter  ondansetron Injectable 4 milliGRAM(s) IV Push every 8 hours PRN Nausea and/or Vomiting      ALLERGIES: No Known Drug Allergies  latex (Hives)      FAMILY HISTORY:  FH: myocardial infarction (Father)    FH: myocardial infarction (Father)    Family history of type 2 diabetes mellitus in brother (Sibling)        PHYSICAL EXAMINATION:  -----------------------------  T(C): 36.6 (05-21-23 @ 09:56), Max: 37.2 (05-20-23 @ 20:27)  HR: 68 (05-21-23 @ 09:56) (68 - 83)  BP: 151/64 (05-21-23 @ 09:56) (145/70 - 163/72)  RR: 18 (05-21-23 @ 09:56) (18 - 18)  SpO2: 94% (05-21-23 @ 09:56) (94% - 100%)  Wt(kg): --    05-20 @ 07:01  -  05-21 @ 07:00  --------------------------------------------------------  IN:    Oral Fluid: 370 mL    sodium chloride 0.45%: 550 mL  Total IN: 920 mL    OUT:    Other (mL): 1500 mL  Total OUT: 1500 mL    Total NET: -580 mL            VITALS  T(C): 36.6 (05-21-23 @ 09:56), Max: 37.2 (05-20-23 @ 20:27)  HR: 68 (05-21-23 @ 09:56) (68 - 83)  BP: 151/64 (05-21-23 @ 09:56) (145/70 - 163/72)  RR: 18 (05-21-23 @ 09:56) (18 - 18)  SpO2: 94% (05-21-23 @ 09:56) (94% - 100%)    Constitutional: well developed, normal appearance, well groomed, well nourished, no deformities and no acute distress.   Eyes: the conjunctiva exhibited no abnormalities and the eyelids demonstrated no xanthelasmas.   HEENT: normal oral mucosa, no oral pallor and no oral cyanosis.   Neck: normal jugular venous A waves present, normal jugular venous V waves present and no jugular venous wilson A waves.   Pulmonary: no respiratory distress, normal respiratory rhythm and effort, no accessory muscle use and lungs were clear to auscultation bilaterally.   Cardiovascular: heart rate and rhythm were normal, normal S1 and S2 and no murmur, gallop, rub, heave or thrill are present.   Abdomen: soft, non-tender, no hepato-splenomegaly and no abdominal mass palpated.   Musculoskeletal: the gait could not be assessed..   Extremities: no clubbing of the fingernails, no localized cyanosis, no petechial hemorrhages and no ischemic changes.   Skin: normal skin color and pigmentation, no rash, no venous stasis, no skin lesions, no skin ulcer and no xanthoma was observed.   Psychiatric: oriented to person, place, and time, the affect was normal, the mood was normal and not feeling anxious.     LABS:   --------  05-20    136  |  101  |  34<H>  ----------------------------<  199<H>  3.7   |  32<H>  |  5.30<H>    Ca    9.3      20 May 2023 08:59                           10.3   13.77 )-----------( 317      ( 20 May 2023 07:52 )             34.6                 RADIOLOGY:  -----------------    ECG:     ECHO:

## 2023-05-21 NOTE — SWALLOW BEDSIDE ASSESSMENT ADULT - DIET PRIOR TO ADMI
regular solids, thin liquids per charting
regular. thin per transfer paperwork

## 2023-05-21 NOTE — PROGRESS NOTE ADULT - ASSESSMENT
REASON FOR VISIT  .. Management of problems listed below      NOTEWORTHY FINDINGS.     PHYSICAL EXAM    HEENT Unremarkable  atraumatic   RESP Fair air entry  Harsh breath sound   CARDIAC S1 S2 No S3     NO JVD    ABDOMEN No hepatosplenomegaly   PEDAL EDEMA present No calf tenderness  NO rash       BEST PRACTICE ISSUES.    HOB ELEVATN.   .. Yes  DVT PPLX.   .. 5/19/2023 apixaba 2.5 x 2 (af)   ..    5/17-5/19/2023 HPSC   ELDER PPLX.   ..    5/17/2023 PROTONIX 40   INFN PPLX.   ..    SP SW AMINAH.    .. 5/21/2023 aminah mince moist   ..  5/19/2023 aminah npo       DIET.    .. 5/21/2023 mince moist   ..  5/17/2023 RENAL   IV fl.  ..      PROCEDURES.  .. 5/18/2023 l thoracentesis 1.5l removd sharan colored   PAST PROCEDURES.    ..     GENERAL DATA .   GOC.    ..    5/19/2023 dnr   ALLGY.  ..    latex                     WT.  ..  5/17/2023 59  BMI.  ..   5/17/2023 19                 ICU STAY.   .. none    DRIPS.  .. CARD 5/- 5/19     ABGS.    VS/ PO/IO/ VENT/ DRIPS.  5/21/2023 afeb 68 150/60   5/21/2023 2l 94%    PROBLEM/ASSESSMENT/PLAN.  PULM.  . GAS EXCHANGE.  .. Target po 90-95%   . PLEURAL EFFUSION.   . 5/18/2023 s prot 6.4   .. cxr cw 3/7/2023 new sm to mod l effsn   .. ct ch nc 5/17/2023 ct ch  .... Sm to mod r effs  .... mod l pl effs with atelectasis consoliatn l lo lobe   .... ground glass and airspace consolidn post segment chaz   .. 5/17/2023 Pl effsn may be sec to esrd chf or parapneumonia   .. cxr 5/20/2023 improvd l aeratn   .. effusn likely sec to esrd  . PLEURAL FLUID ANALYSIS.  .. 5/18/2023 l thorac 1.5 l   ..5/18 pl fl l 13 m 73 p 5 afb sm (-) g 131 l 102/268 (.38) ph 7.6 pr 2.6/6.4 (.4)  .. Fluid transudate    INFECTION.  . ENTERORHINOVIRUS INFECTION RESP TRACT .  . PNEUMONIA.    .. W 5/17-5/18-5/19-5/21/2023 w 12.1-15.5 - 12 - 11  .. pr 5/17/2023 pr .9   .. rvp 5/17/2023 enterorhinovirus   .. 5/18 pl fl c (-)   . 5/17 bc (-)   .. 5/17/2023 ROCEPHIN x 5d Dr CHEEK    .. Manage viral infection with supp care   .. Empiric antibio started by id to cover superimposed bact pneumonia cap or aspiration in setting of esrd    CARDIAC.  .. HYTN.  .. 5/17/2023 CLONIDINE .1 X 2   . CAD.  .. tr 5/17/2023 tr 56  .. 5/17/2023 ASA 81   .. 5/17/2023 ATORVASTAT 40   . A FIB RVR 5/17/2023   .. 5/19/2023 CARDIZEM 60.3   .. 5/17-5/19/2023 4P CARDIZEM DRIP 125  5/H  .. 5/17/2023 METOPROLOL 100.2   .. 5/19/2023 apixaba 2.5 x 2 (af) Dr Yun   . CHF.  .. bnp bnp 175k     HEMAT.  . ANEMIA  .. Hb 5/17-5/18-5/19-5/21/2023 Hb 10.5- 10.6 - 9.7 - 9.6  .. inr 5/17/2023 inr 125     GI.  . DYSPHAGIA   .. Sp 5/21/2023 mince moist aminah    RENAL.  . ESRD  .. Na 5/17/2023 na 133   .. cr 5/17/2023 cr 4.6   .. HD     NEURO.  .. 5/17/2023 GABAPENTIN 100  .. 5/17/2023 IMIPRAMINE 50   .. 5/17/2023 RISPERIDAL .5       OVERALL.  . 72-year-old female former smoker with history of A-fib (not on ac sec fall risk, diabetes, hypertension, hyperlipidemia, ESRD hemodialysis, CHF, CAD/CABG, PVD was sent from [facility] 5/17/2023 with cough for 2 days. pt c/o dry cough, no fever, chills no sob, pt states she got dialysis 5/16/2023 ,   .. Pt was recently admitted 3/7-3/11/2023 and before that 2/22-2/25/2023   .. 5/17/2023 No antibio use last 3 m   .. Pt admitted 5/17/2023 Pulm consulted     COURSE   . Found to have enterorhinovirus and large l effs  . 5/18/2023 l thora done      ACTIVE PROBLEMS .  . ENTERORHINOVIRUS INFECTION RESP TRACT 5/17  . PLEURAL EFFUSION SP l THORA 5/18 TRANSUDATE   . PNEUMONIA 5/17/2023  .... 5/17/2023 JOSE ALEJANDRO CHEEK   . AF RVR 5/17-5/19/2023 Cardizem drip  5/19 apixaba  . ESRD HD     OTHER PROBLEMS.  . CAD.  . HYTN.   . CHF.  . ESRD.    . DYSPHAGIA 5/19/2023   .. 5/21/2023 sp aminah mince moist    PMH.   CAD.  .. HISTORY ho cabg  A fib  .. 3/7/2023 Not on ac sec multiple falls  PAD  .. sp R-> L bifem - fem BK pop bypass   .. Fem pop bypass 6/21/2022   .. Partial ray amputation r great toe 6/22/2022   ESRD   .. On HD       TIME SPENT   Over 25 minutes aggregate care time spent on encounter; activities included   direct patient care, counseling and/or coordinating care reviewing notes, lab data/ imaging , discussion with multidisciplinary team/ patient  /family and explaining in detail risks, benefits, alternatives  of the recommendations     JOSE F LAO 72 f 5/17/2023 1948 DR HARRY ALBRIGHT

## 2023-05-21 NOTE — CONSULT NOTE ADULT - ASSESSMENT
dysphagia    failed s/s eval  molst in chart reviewed  clearly states no feeding tube  she is DNR  consider pleasure feeds

## 2023-05-21 NOTE — PROGRESS NOTE ADULT - ASSESSMENT
71yo female bib ems with cough for 2 days. pt c/o dry cough, no fever, chills no sob, pt states she got dialysis yesterday, +smoking hx no other complaints RVP positive for enterovirus /Chest xray at [facility] showed L lung opacification    pleural eff  atelectasis  esrd  anemia  OP  OA  URI  HTN  CAD  Smoker    NPO  on ABX  vs noted    pt is DNR DNI  no feeding tube  ok for Dialysis  MOLST filled out - signed - updated

## 2023-05-21 NOTE — PROGRESS NOTE ADULT - SUBJECTIVE AND OBJECTIVE BOX
Date/Time Patient Seen:  		  Referring MD:   Data Reviewed	       Patient is a 72y old  Female who presents with a chief complaint of ABNORMAL XRAY OF CHEST (20 May 2023 16:52)      Subjective/HPI     PAST MEDICAL & SURGICAL HISTORY:  Diabetes mellitus II    HTN (hypertension)    h/o Anxiety attack    Depression    h/o Myocardial infarct 2007    CAD (coronary artery disease)    CAD (coronary artery disease)    h/o Hepatitis A 1969  currently resolved, no symptoms    PAD (peripheral artery disease)    Murmur, cardiac    h/o Smoking  quitted 3/2012    CRF (chronic renal failure), unspecified stage    Dialysis patient    Anemia secondary to renal failure    HTN (hypertension)    Osteomyelitis    ESRD on dialysis    Falls    Ataxia    Type 2 diabetes mellitus    Peripheral vascular disease, unspecified    CAD (coronary artery disease)    Diabetes    coronary stent 2007    s/p Ovarian cyst removal    s/p surgical removal of benign Skin lesion epigastric area    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          Medication list         MEDICATIONS  (STANDING):  apixaban 2.5 milliGRAM(s) Oral two times a day  aspirin enteric coated 81 milliGRAM(s) Oral daily  cefTRIAXone   IVPB 1000 milliGRAM(s) IV Intermittent every 24 hours  cloNIDine 0.1 milliGRAM(s) Oral two times a day  dextrose 5%. 1000 milliLiter(s) (100 mL/Hr) IV Continuous <Continuous>  dextrose 5%. 1000 milliLiter(s) (50 mL/Hr) IV Continuous <Continuous>  dextrose 50% Injectable 12.5 Gram(s) IV Push once  dextrose 50% Injectable 25 Gram(s) IV Push once  dextrose 50% Injectable 25 Gram(s) IV Push once  diltiazem    Tablet 60 milliGRAM(s) Oral three times a day  dronabinol 2.5 milliGRAM(s) Oral two times a day before meals  glucagon  Injectable 1 milliGRAM(s) IntraMuscular once  imipramine 50 milliGRAM(s) Oral daily  insulin lispro (ADMELOG) corrective regimen sliding scale   SubCutaneous three times a day before meals  metoprolol tartrate 100 milliGRAM(s) Oral two times a day  mirtazapine 7.5 milliGRAM(s) Oral at bedtime  multivitamin 1 Tablet(s) Oral daily  pantoprazole   Suspension 40 milliGRAM(s) Oral before breakfast  senna 2 Tablet(s) Oral at bedtime  sodium chloride 0.45%. 1000 milliLiter(s) (50 mL/Hr) IV Continuous <Continuous>    MEDICATIONS  (PRN):  acetaminophen     Tablet .. 650 milliGRAM(s) Oral every 6 hours PRN Temp greater or equal to 38C (100.4F), Mild Pain (1 - 3)  aluminum hydroxide/magnesium hydroxide/simethicone Suspension 30 milliLiter(s) Oral every 4 hours PRN Dyspepsia  dextrose Oral Gel 15 Gram(s) Oral once PRN Blood Glucose LESS THAN 70 milliGRAM(s)/deciliter  ondansetron Injectable 4 milliGRAM(s) IV Push every 8 hours PRN Nausea and/or Vomiting         Vitals log        ICU Vital Signs Last 24 Hrs  T(C): 37 (21 May 2023 05:00), Max: 37.2 (20 May 2023 20:27)  T(F): 98.6 (21 May 2023 05:00), Max: 99 (20 May 2023 20:27)  HR: 76 (21 May 2023 05:00) (67 - 83)  BP: 154/84 (21 May 2023 05:00) (104/54 - 163/72)  BP(mean): --  ABP: --  ABP(mean): --  RR: 18 (21 May 2023 05:00) (18 - 18)  SpO2: 96% (21 May 2023 05:00) (96% - 100%)    O2 Parameters below as of 21 May 2023 05:00  Patient On (Oxygen Delivery Method): nasal cannula  O2 Flow (L/min): 3               Input and Output:  I&O's Detail    20 May 2023 07:01  -  21 May 2023 06:07  --------------------------------------------------------  IN:    Oral Fluid: 370 mL  Total IN: 370 mL    OUT:    Other (mL): 1500 mL  Total OUT: 1500 mL    Total NET: -1130 mL          Lab Data                        10.3   13.77 )-----------( 317      ( 20 May 2023 07:52 )             34.6     05-20    136  |  101  |  34<H>  ----------------------------<  199<H>  3.7   |  32<H>  |  5.30<H>    Ca    9.3      20 May 2023 08:59              Review of Systems	      Objective     Physical Examination    heart s1s2  lung dc BS  head nc      Pertinent Lab findings & Imaging      Dexter:  NO   Adequate UO     I&O's Detail    20 May 2023 07:01  -  21 May 2023 06:07  --------------------------------------------------------  IN:    Oral Fluid: 370 mL  Total IN: 370 mL    OUT:    Other (mL): 1500 mL  Total OUT: 1500 mL    Total NET: -1130 mL               Discussed with:     Cultures:	        Radiology

## 2023-05-21 NOTE — SWALLOW BEDSIDE ASSESSMENT ADULT - SWALLOW EVAL: CURRENT DIET
regular solids, thin liquids per charting
NPO

## 2023-05-21 NOTE — SWALLOW BEDSIDE ASSESSMENT ADULT - SWALLOW EVAL: RECOMMENDED DIET
NPO with short-term vs. long term non-oral means of nutrition/hydration per pt/family wishes
Minced and moist solids, mildly thick liquids

## 2023-05-21 NOTE — CONSULT NOTE ADULT - SUBJECTIVE AND OBJECTIVE BOX
[facility]  Frandy Strong PA-C  [address] [place] [id number] [phone]      Chief Complaint:  Patient is a 72y old  Female who presents with a chief complaint of ABNORMAL XRAY OF CHEST (21 May 2023 06:07)      HPI:71yo female bib ems with cough for 2 days. pt c/o dry cough, no fever, chills no sob, pt states she got dialysis yesterday, +smoking hx no other complaintsRVP positive for enterovirus /Chest xray at [facility] showed L lung opacification .In ER found to have rapid afib and seen by cardiologist Admitted  to telemetry unit for monitoring , send 3 sets of cardiac enzymes to rule out acute coronary event, obtain ECHO to evaluate LVEF, cardiology consult  ,continue current management, O2 supply, anticoagulation plan as per cardiology consult  chest CT that showed bilateral effusion more in left with compressive atelectasis vs consolidation. Admit to monitored unit for cardiac monitoring, obtain echo to evaluate LVEF, intravenous diuresis as per card consult , monitor ins/outs, monitor renal profile and electrolytes closely ,send 3 sets of enzymes, O2 supply, serial chest xrays, monitor weights and oral intake of fluids, nutritionist consult .Seen b y pulmonologist and ID consult Procalcitonin 0.9 WBC on admission 12k Tmax 99.1 This could be just viral infection causing cough but since Chest CT has questionable consolidations, will cover for CAP. Also procalcitonin is slightly high due to ESRD. Palliative care consult requested ,to discuss advance directives and complete MOLST  (17 May 2023 14:01)      Allergies:  No Known Drug Allergies  latex (Hives)      Medications:  acetaminophen     Tablet .. 650 milliGRAM(s) Oral every 6 hours PRN  aluminum hydroxide/magnesium hydroxide/simethicone Suspension 30 milliLiter(s) Oral every 4 hours PRN  apixaban 2.5 milliGRAM(s) Oral two times a day  aspirin enteric coated 81 milliGRAM(s) Oral daily  cefTRIAXone   IVPB 1000 milliGRAM(s) IV Intermittent every 24 hours  cloNIDine 0.1 milliGRAM(s) Oral two times a day  dextrose 5%. 1000 milliLiter(s) IV Continuous <Continuous>  dextrose 5%. 1000 milliLiter(s) IV Continuous <Continuous>  dextrose 50% Injectable 12.5 Gram(s) IV Push once  dextrose 50% Injectable 25 Gram(s) IV Push once  dextrose 50% Injectable 25 Gram(s) IV Push once  dextrose Oral Gel 15 Gram(s) Oral once PRN  diltiazem    Tablet 60 milliGRAM(s) Oral three times a day  dronabinol 2.5 milliGRAM(s) Oral two times a day before meals  glucagon  Injectable 1 milliGRAM(s) IntraMuscular once  imipramine 50 milliGRAM(s) Oral daily  insulin lispro (ADMELOG) corrective regimen sliding scale   SubCutaneous three times a day before meals  metoprolol tartrate 100 milliGRAM(s) Oral two times a day  mirtazapine 7.5 milliGRAM(s) Oral at bedtime  multivitamin 1 Tablet(s) Oral daily  ondansetron Injectable 4 milliGRAM(s) IV Push every 8 hours PRN  pantoprazole   Suspension 40 milliGRAM(s) Oral before breakfast  senna 2 Tablet(s) Oral at bedtime  sodium chloride 0.45%. 1000 milliLiter(s) IV Continuous <Continuous>      PMHX/PSHX:  Diabetes mellitus II    HTN (hypertension)    h/o Anxiety attack    Depression    h/o Myocardial infarct     CAD (coronary artery disease)    CAD (coronary artery disease)    h/o Hepatitis A     PAD (peripheral artery disease)    Murmur, cardiac    h/o Smoking    CRF (chronic renal failure), unspecified stage    Dialysis patient    Anemia secondary to renal failure    HTN (hypertension)    Osteomyelitis    ESRD on dialysis    Falls    Ataxia    Type 2 diabetes mellitus    Peripheral vascular disease, unspecified    CAD (coronary artery disease)    Diabetes    coronary stent     s/p Ovarian cyst removal    s/p surgical removal of benign Skin lesion epigastric area    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        Family history:  No pertinent family history in first degree relatives    No pertinent family history in first degree relatives    FH: myocardial infarction (Father)    FH: myocardial infarction (Father)    Family history of type 2 diabetes mellitus in brother (Sibling)        Social History:     ROS:   unable to obtain        PHYSICAL EXAM:   Vital Signs:  Vital Signs Last 24 Hrs  T(C): 37 (21 May 2023 05:00), Max: 37.2 (20 May 2023 20:27)  T(F): 98.6 (21 May 2023 05:00), Max: 99 (20 May 2023 20:27)  HR: 76 (21 May 2023 05:00) (67 - 83)  BP: 154/84 (21 May 2023 05:00) (104/54 - 163/72)  BP(mean): --  RR: 18 (21 May 2023 05:00) (18 - 18)  SpO2: 96% (21 May 2023 05:00) (96% - 100%)    Parameters below as of 21 May 2023 05:00  Patient On (Oxygen Delivery Method): nasal cannula  O2 Flow (L/min): 3    Daily     Daily Weight in k.1 (21 May 2023 05:00)    nad  confused  frail  non toxic  soft, nt  no edema      LABS:                        10.3   13.77 )-----------( 317      ( 20 May 2023 07:52 )             34.6     05-20    136  |  101  |  34<H>  ----------------------------<  199<H>  3.7   |  32<H>  |  5.30<H>    Ca    9.3      20 May 2023 08:59                Imaging:

## 2023-05-21 NOTE — SWALLOW BEDSIDE ASSESSMENT ADULT - PHARYNGEAL PHASE
Decreased laryngeal elevation/Wet vocal quality post oral intake/Cough post oral intake/Multiple swallows Within functional limits Delayed pharyngeal swallow

## 2023-05-21 NOTE — PROGRESS NOTE ADULT - ASSESSMENT
73yo female bib ems with cough for 2 days. pt c/o dry cough, no fever, chills no sob, pt states she got dialysis yesterday, +smoking hx no other complaintsRVP positive for enterovirus /Chest xray at [facility] showed L lung opacification .In ER found to have rapid afib and seen by cardiologist Admitted  to telemetry unit for monitoring , send 3 sets of cardiac enzymes to rule out acute coronary event, obtain ECHO to evaluate LVEF, cardiology consult  ,continue current management, O2 supply, anticoagulation plan as per cardiology consult  chest CT that showed bilateral effusion more in left with compressive atelectasis vs consolidation. Admit to monitored unit for cardiac monitoring, obtain echo to evaluate LVEF, intravenous diuresis as per card consult , monitor ins/outs, monitor renal profile and electrolytes closely ,send 3 sets of enzymes, O2 supply, serial chest xrays, monitor weights and oral intake of fluids, nutritionist consult .Seen b y pulmonologist and ID consult Procalcitonin 0.9 WBC on admission 12k Tmax 99.1 This could be just viral infection causing cough but since Chest CT has questionable consolidations, will cover for CAP. Also procalcitonin is slightly high due to ESRD. Palliative care consult requested ,to discuss advance directives and complete MOLST

## 2023-05-21 NOTE — PROGRESS NOTE ADULT - SUBJECTIVE AND OBJECTIVE BOX
PROGRESS NOTE  Patient is a 72y old  Female who presents with a chief complaint of ABNORMAL XRAY OF CHEST (21 May 2023 10:19)    Chart and available morning labs /imaging are reviewed electronically , urgent issues addressed . More information  is being added upon completion of rounds , when more information is collected and management discussed with consultants , medical staff and social service/case management on the floor   OVERNIGHT  No new issues reported by medical staff . All above noted Patient is resting in a bed comfortably .Confused ,poor mentation .No distress noted   Passed slp   HPI:  73yo female bib ems with cough for 2 days. pt c/o dry cough, no fever, chills no sob, pt states she got dialysis yesterday, +smoking hx no other complaintsRVP positive for enterovirus /Chest xray at [facility] showed L lung opacification .In ER found to have rapid afib and seen by cardiologist Admitted  to telemetry unit for monitoring , send 3 sets of cardiac enzymes to rule out acute coronary event, obtain ECHO to evaluate LVEF, cardiology consult  ,continue current management, O2 supply, anticoagulation plan as per cardiology consult  chest CT that showed bilateral effusion more in left with compressive atelectasis vs consolidation. Admit to monitored unit for cardiac monitoring, obtain echo to evaluate LVEF, intravenous diuresis as per card consult , monitor ins/outs, monitor renal profile and electrolytes closely ,send 3 sets of enzymes, O2 supply, serial chest xrays, monitor weights and oral intake of fluids, nutritionist consult .Seen b y pulmonologist and ID consult Procalcitonin 0.9 WBC on admission 12k Tmax 99.1 This could be just viral infection causing cough but since Chest CT has questionable consolidations, will cover for CAP. Also procalcitonin is slightly high due to ESRD. Palliative care consult requested ,to discuss advance directives and complete MOLST  (17 May 2023 14:01)    PAST MEDICAL & SURGICAL HISTORY:  HTN (hypertension)      h/o Anxiety attack      Depression      h/o Myocardial infarct 2007      h/o Hepatitis A 1969  currently resolved, no symptoms      Murmur, cardiac      h/o Smoking  quitted 3/2012      Anemia secondary to renal failure      ESRD on dialysis      Falls      Ataxia      Type 2 diabetes mellitus      Peripheral vascular disease, unspecified      CAD (coronary artery disease)      Diabetes      coronary stent 2007      s/p Ovarian cyst removal      s/p surgical removal of benign Skin lesion epigastric area      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          MEDICATIONS  (STANDING):  apixaban 2.5 milliGRAM(s) Oral two times a day  aspirin enteric coated 81 milliGRAM(s) Oral daily  cefTRIAXone   IVPB 1000 milliGRAM(s) IV Intermittent every 24 hours  cloNIDine 0.1 milliGRAM(s) Oral two times a day  dextrose 5%. 1000 milliLiter(s) (50 mL/Hr) IV Continuous <Continuous>  dextrose 5%. 1000 milliLiter(s) (100 mL/Hr) IV Continuous <Continuous>  dextrose 50% Injectable 12.5 Gram(s) IV Push once  dextrose 50% Injectable 25 Gram(s) IV Push once  dextrose 50% Injectable 25 Gram(s) IV Push once  diltiazem    Tablet 60 milliGRAM(s) Oral three times a day  dronabinol 2.5 milliGRAM(s) Oral two times a day before meals  glucagon  Injectable 1 milliGRAM(s) IntraMuscular once  imipramine 50 milliGRAM(s) Oral daily  insulin lispro (ADMELOG) corrective regimen sliding scale   SubCutaneous three times a day before meals  metoprolol tartrate 100 milliGRAM(s) Oral two times a day  mirtazapine 7.5 milliGRAM(s) Oral at bedtime  multivitamin 1 Tablet(s) Oral daily  pantoprazole   Suspension 40 milliGRAM(s) Oral before breakfast  senna 2 Tablet(s) Oral at bedtime    MEDICATIONS  (PRN):  acetaminophen     Tablet .. 650 milliGRAM(s) Oral every 6 hours PRN Temp greater or equal to 38C (100.4F), Mild Pain (1 - 3)  aluminum hydroxide/magnesium hydroxide/simethicone Suspension 30 milliLiter(s) Oral every 4 hours PRN Dyspepsia  dextrose Oral Gel 15 Gram(s) Oral once PRN Blood Glucose LESS THAN 70 milliGRAM(s)/deciliter  ondansetron Injectable 4 milliGRAM(s) IV Push every 8 hours PRN Nausea and/or Vomiting      OBJECTIVE    T(C): 36.6 (05-21-23 @ 09:56), Max: 37.2 (05-20-23 @ 20:27)  HR: 68 (05-21-23 @ 09:56) (68 - 83)  BP: 151/64 (05-21-23 @ 09:56) (145/70 - 163/72)  RR: 18 (05-21-23 @ 09:56) (18 - 18)  SpO2: 94% (05-21-23 @ 09:56) (94% - 100%)  Wt(kg): --  I&O's Summary    20 May 2023 07:01  -  21 May 2023 07:00  --------------------------------------------------------  IN: 920 mL / OUT: 1500 mL / NET: -580 mL    21 May 2023 07:01  -  21 May 2023 12:17  --------------------------------------------------------  IN: 200 mL / OUT: 0 mL / NET: 200 mL          REVIEW OF SYSTEMS:  Patient is  unable to provide any information/ROS  due to baseline mental status.   PHYSICAL EXAM:  Appearance: NAD. VS past 24 hrs -as above   HEENT:   Moist oral mucosa. Conjunctiva clear b/l.   Neck : supple  Respiratory: Lungs CTAB.  Gastrointestinal:  Soft, nontender. No rebound. No rigidity. BS present	  Cardiovascular: RRR ,S1S2 present  Neurologic: Non-focal. Moving all extremities.  Extremities: No edema. No erythema. No calf tenderness.  Skin: No rashes, No ecchymoses, No cyanosis.	  wounds ,skin lesions-See skin assesment flow sheet   LABS:                        9.6    11.69 )-----------( 355      ( 21 May 2023 11:13 )             31.4     05-21    134<L>  |  99  |  22  ----------------------------<  307<H>  3.6   |  29  |  3.50<H>    Ca    8.4<L>      21 May 2023 11:13      CAPILLARY BLOOD GLUCOSE      POCT Blood Glucose.: 282 mg/dL (21 May 2023 11:12)  POCT Blood Glucose.: 253 mg/dL (21 May 2023 07:37)  POCT Blood Glucose.: 384 mg/dL (20 May 2023 21:23)  POCT Blood Glucose.: 173 mg/dL (20 May 2023 17:06)  POCT Blood Glucose.: 112 mg/dL (20 May 2023 13:41)          Culture - Fungal, Body Fluid (collected 18 May 2023 13:10)  Source: Pleural Fl Pleural Fluid  Preliminary Report (20 May 2023 15:03):    Culture is being performed. Fungal cultures are held for 4 weeks.    Culture - Body Fluid with Gram Stain (collected 18 May 2023 13:10)  Source: Pleural Fl Pleural Fluid  Gram Stain (18 May 2023 22:17):    polymorphonuclear leukocytes seen    No organisms seen    by cytocentrifuge  Preliminary Report (19 May 2023 16:08):    No growth    Culture - Acid Fast - Body Fluid w/Smear (collected 18 May 2023 13:10)  Source: Pleural Fl Pleural Fluid  Preliminary Report (20 May 2023 15:08):    Culture is being performed.    Culture - Blood (collected 17 May 2023 09:42)  Source: .Blood Blood-Peripheral  Preliminary Report (18 May 2023 18:02):    No growth to date.    Culture - Blood (collected 17 May 2023 09:40)  Source: .Blood Blood-Peripheral  Preliminary Report (18 May 2023 18:02):    No growth to date.      RADIOLOGY & ADDITIONAL TESTS: Observations:  · General Observations	Awake and cooperative with confusion noted throughout    Oral Musculature Evaluation:  · Oral Musculature	generally intact  · Structural Abnormalities	none present  · Dentition	upper dentures; missing lower dentures  · Secretion Management	baseline cough  · Mandibular Strength and Mobility	intact  · Labial Strength, Mobility and Agility	within functional limits  · Lingual Strength, Mobility and Agility	within functional limits  · Buccal Strength and Mobility	intact    Bedside Swallow: Trial 1  · Consistencies administered	pureed  · Mode of Presentation	spoon  · Positioning	upright (90 degrees)  · Oral Preparatory Phase	Within functional limits  · Oral Phase	Within functional limits  · Pharyngeal Phase	Within functional limits    Bedside Swallow: Trial 2  · Consistencies administered	minced & moist  · Mode of Presentation	spoon  · Positioning	upright (90 degrees)  · Oral Preparatory Phase	Within functional limits  · Oral Phase	Within functional limits  · Pharyngeal Phase	Within functional limits    Bedside Swallow: Trial 3  · Consistencies administered	soft & bite-sized  · Mode of Presentation	spoon; fed by clinician  · Positioning	upright (90 degrees)  · Oral Preparatory Phase	Within functional limits  · Oral Phase	Decreased anterior-posterior movement of the bolus; Delayed oral transit time; Lingual stasis  · Pharyngeal Phase	Delayed pharyngeal swallow    Bedside Swallow: Trial 4  · Consistencies administered	mildly thick  · Mode of Presentation	cup; self fed  · Positioning	upright (90 degrees)  · Oral Preparatory Phase	Within functional limits  · Oral Phase	Within functional limits  · Pharyngeal Phase	Within functional limits    Bedside Swallow: Trial 5  · Consistencies administered	thin liquid  · Mode of Presentation	self fed; cup  · Oral Preparatory Phase	Within functional limits  · Oral Phase	suspected posterior loss  · Pharyngeal Phase	Decreased laryngeal elevation; Wet vocal quality post oral intake; Cough post oral intake; Multiple swallows      45 minutes aggregate time was spent on this visit, 50% visit time spent in care co-ordination with other attendings and counselling patient .I have discussed care plan with patient / HCP/family member ,who expressed understanding of problems treatment and their effect and side effects, alternatives in details. I have asked if they have any questions and concerns and appropriately addressed them to best of my ability.

## 2023-05-21 NOTE — PROGRESS NOTE ADULT - SUBJECTIVE AND OBJECTIVE BOX
Patient is a 72y Female whom presented to the hospital with esrd on hd     PAST MEDICAL & SURGICAL HISTORY:  HTN (hypertension)      h/o Anxiety attack      Depression      h/o Myocardial infarct 2007      h/o Hepatitis A 1969  currently resolved, no symptoms      Murmur, cardiac      h/o Smoking  quitted 3/2012      Anemia secondary to renal failure      ESRD on dialysis      Falls      Ataxia      Type 2 diabetes mellitus      Peripheral vascular disease, unspecified      CAD (coronary artery disease)      Diabetes      coronary stent 2007      s/p Ovarian cyst removal      s/p surgical removal of benign Skin lesion epigastric area      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          MEDICATIONS  (STANDING):  aspirin enteric coated 81 milliGRAM(s) Oral daily  atorvastatin 40 milliGRAM(s) Oral at bedtime  cefTRIAXone   IVPB 1000 milliGRAM(s) IV Intermittent every 24 hours  cloNIDine 0.1 milliGRAM(s) Oral two times a day  dextrose 5%. 1000 milliLiter(s) (50 mL/Hr) IV Continuous <Continuous>  dextrose 5%. 1000 milliLiter(s) (100 mL/Hr) IV Continuous <Continuous>  dextrose 50% Injectable 12.5 Gram(s) IV Push once  dextrose 50% Injectable 25 Gram(s) IV Push once  dextrose 50% Injectable 25 Gram(s) IV Push once  diltiazem Infusion 5 mG/Hr (5 mL/Hr) IV Continuous <Continuous>  gabapentin 100 milliGRAM(s) Oral at bedtime  glucagon  Injectable 1 milliGRAM(s) IntraMuscular once  heparin   Injectable 5000 Unit(s) SubCutaneous every 12 hours  imipramine 50 milliGRAM(s) Oral daily  insulin lispro (ADMELOG) corrective regimen sliding scale   SubCutaneous three times a day before meals  melatonin 3 milliGRAM(s) Oral at bedtime  metoprolol tartrate 100 milliGRAM(s) Oral two times a day  multivitamin 1 Tablet(s) Oral daily  pantoprazole    Tablet 40 milliGRAM(s) Oral before breakfast  risperiDONE   Tablet 0.5 milliGRAM(s) Oral at bedtime  senna 2 Tablet(s) Oral at bedtime      Allergies    No Known Drug Allergies  latex (Hives)    Intolerances        SOCIAL HISTORY:  Denies ETOh,Smoking,     FAMILY HISTORY:  FH: myocardial infarction (Father)    FH: myocardial infarction (Father)    Family history of type 2 diabetes mellitus in brother (Sibling)        REVIEW OF SYSTEMS:    CONSTITUTIONAL: No weakness, fevers or chills  EYES/ENT: No visual changes;  no throat pain   NECK: No pain or stiffness  RESPIRATORY: No cough, wheezing, hemoptysis; No shortness of breath  CARDIOVASCULAR: No chest pain or palpitations  GASTROINTESTINAL: No abdominal or epigastric pain. No nausea, vomiting,     No diarrhea or constipation. No melena   GENITOURINARY: No dysuria, frequency or hematuria  NEUROLOGICAL: No numbness or weakness  SKIN: dry                                                                    9.6    11.69 )-----------( 355      ( 21 May 2023 11:13 )             31.4       CBC Full  -  ( 21 May 2023 11:13 )  WBC Count : 11.69 K/uL  RBC Count : 3.16 M/uL  Hemoglobin : 9.6 g/dL  Hematocrit : 31.4 %  Platelet Count - Automated : 355 K/uL  Mean Cell Volume : 99.4 fl  Mean Cell Hemoglobin : 30.4 pg  Mean Cell Hemoglobin Concentration : 30.6 gm/dL  Auto Neutrophil # : x  Auto Lymphocyte # : x  Auto Monocyte # : x  Auto Eosinophil # : x  Auto Basophil # : x  Auto Neutrophil % : x  Auto Lymphocyte % : x  Auto Monocyte % : x  Auto Eosinophil % : x  Auto Basophil % : x      05-21    134<L>  |  99  |  22  ----------------------------<  307<H>  3.6   |  29  |  3.50<H>    Ca    8.4<L>      21 May 2023 11:13        CAPILLARY BLOOD GLUCOSE      POCT Blood Glucose.: 282 mg/dL (21 May 2023 11:12)  POCT Blood Glucose.: 253 mg/dL (21 May 2023 07:37)  POCT Blood Glucose.: 384 mg/dL (20 May 2023 21:23)  POCT Blood Glucose.: 173 mg/dL (20 May 2023 17:06)  POCT Blood Glucose.: 112 mg/dL (20 May 2023 13:41)      Vital Signs Last 24 Hrs  T(C): 36.6 (21 May 2023 09:56), Max: 37.2 (20 May 2023 20:27)  T(F): 97.9 (21 May 2023 09:56), Max: 99 (20 May 2023 20:27)  HR: 68 (21 May 2023 09:56) (68 - 83)  BP: 151/64 (21 May 2023 09:56) (145/70 - 163/72)  BP(mean): --  RR: 18 (21 May 2023 09:56) (18 - 18)  SpO2: 94% (21 May 2023 09:56) (94% - 100%)    Parameters below as of 21 May 2023 09:56  Patient On (Oxygen Delivery Method): nasal cannula  O2 Flow (L/min): 3                  PHYSICAL EXAM:    Constitutional: NAD  HEENT: conjunctive   clear   Neck:  No JVD  Respiratory: CTAB  Cardiovascular: S1 and S2  Gastrointestinal: BS+, soft, NT/ND  Extremities: No peripheral edema  Neurological:  no focal deficits  Psychiatric: Normal mood, normal affect  : No Renteria  Skin: No rashes  Access: Not applicable

## 2023-05-21 NOTE — PROGRESS NOTE ADULT - SUBJECTIVE AND OBJECTIVE BOX
CHIEF COMPLAINT/ REASON FOR VISIT  .. Patient was seen to address the  issue listed under PROBLEM LIST which is located toward bottom of this note     SHILO KOHLER    [facility] TELE 309 D1    Allergies    No Known Drug Allergies  latex (Hives)    Intolerances        PAST MEDICAL & SURGICAL HISTORY:  HTN (hypertension)      h/o Anxiety attack      Depression      h/o Myocardial infarct 2007      h/o Hepatitis A 1969  currently resolved, no symptoms      Murmur, cardiac      h/o Smoking  quitted 3/2012      Anemia secondary to renal failure      ESRD on dialysis      Falls      Ataxia      Type 2 diabetes mellitus      Peripheral vascular disease, unspecified      CAD (coronary artery disease)      Diabetes      coronary stent 2007      s/p Ovarian cyst removal      s/p surgical removal of benign Skin lesion epigastric area      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          FAMILY HISTORY:  FH: myocardial infarction (Father)    FH: myocardial infarction (Father)    Family history of type 2 diabetes mellitus in brother (Sibling)        Home Medications:  acetaminophen 325 mg oral tablet: 2 tab(s) orally every 6 hours, As needed, Temp greater or equal to 38C (100.4F), Mild Pain (1 - 3) (17 May 2023 14:45)  Admelog SoloStar 100 units/mL injectable solution: injectable 3 times a day (before meals)sliding scale  (17 May 2023 14:45)  aspirin 81 mg oral delayed release tablet: 1 tab(s) orally once a day (at bedtime) (17 May 2023 14:45)  atorvastatin 40 mg oral tablet: 1 tab(s) orally once a day (at bedtime) (17 May 2023 14:45)  Basaglar KwikPen 100 units/mL subcutaneous solution: 18 unit(s) subcutaneous once a day (at bedtime) (17 May 2023 14:42)  cloNIDine 0.1 mg oral tablet: 1 tab(s) orally 2 times a day (17 May 2023 14:45)  gabapentin 100 mg oral capsule: 1 cap(s) orally once a day (at bedtime) (17 May 2023 14:41)  imipramine 50 mg oral tablet: 1 tab(s) orally once a day (17 May 2023 14:45)  Lokelma 10 g oral powder for reconstitution: 10 gram(s) orally 2 times a week on Wed and Sat  (17 May 2023 14:45)  metoprolol tartrate 100 mg oral tablet: 1 tab(s) orally once a day (at bedtime) (17 May 2023 14:45)  Mucinex 600 mg oral tablet, extended release: 1 tab(s) orally 2 times a day (17 May 2023 14:43)  Nephro-Alfie oral tablet: 1 tab(s) orally once a day (17 May 2023 14:45)  PANTOPRAZOLE 40MG DR TAB: 1 tab(s) orally once a day (17 May 2023 14:45)      MEDICATIONS  (STANDING):  apixaban 2.5 milliGRAM(s) Oral two times a day  aspirin enteric coated 81 milliGRAM(s) Oral daily  cefTRIAXone   IVPB 1000 milliGRAM(s) IV Intermittent every 24 hours  cloNIDine 0.1 milliGRAM(s) Oral two times a day  dextrose 5%. 1000 milliLiter(s) (100 mL/Hr) IV Continuous <Continuous>  dextrose 5%. 1000 milliLiter(s) (50 mL/Hr) IV Continuous <Continuous>  dextrose 50% Injectable 12.5 Gram(s) IV Push once  dextrose 50% Injectable 25 Gram(s) IV Push once  dextrose 50% Injectable 25 Gram(s) IV Push once  diltiazem    Tablet 60 milliGRAM(s) Oral three times a day  dronabinol 2.5 milliGRAM(s) Oral two times a day before meals  glucagon  Injectable 1 milliGRAM(s) IntraMuscular once  imipramine 50 milliGRAM(s) Oral daily  insulin lispro (ADMELOG) corrective regimen sliding scale   SubCutaneous three times a day before meals  metoprolol tartrate 100 milliGRAM(s) Oral two times a day  mirtazapine 7.5 milliGRAM(s) Oral at bedtime  multivitamin 1 Tablet(s) Oral daily  pantoprazole   Suspension 40 milliGRAM(s) Oral before breakfast  senna 2 Tablet(s) Oral at bedtime  sodium chloride 0.45%. 1000 milliLiter(s) (50 mL/Hr) IV Continuous <Continuous>    MEDICATIONS  (PRN):  acetaminophen     Tablet .. 650 milliGRAM(s) Oral every 6 hours PRN Temp greater or equal to 38C (100.4F), Mild Pain (1 - 3)  aluminum hydroxide/magnesium hydroxide/simethicone Suspension 30 milliLiter(s) Oral every 4 hours PRN Dyspepsia  dextrose Oral Gel 15 Gram(s) Oral once PRN Blood Glucose LESS THAN 70 milliGRAM(s)/deciliter  ondansetron Injectable 4 milliGRAM(s) IV Push every 8 hours PRN Nausea and/or Vomiting      Diet, NPO:   Except Medications (05-20-23 @ 17:40) [Active]          Vital Signs Last 24 Hrs  T(C): 36.6 (21 May 2023 09:56), Max: 37.2 (20 May 2023 20:27)  T(F): 97.9 (21 May 2023 09:56), Max: 99 (20 May 2023 20:27)  HR: 68 (21 May 2023 09:56) (68 - 83)  BP: 151/64 (21 May 2023 09:56) (145/70 - 163/72)  BP(mean): --  RR: 18 (21 May 2023 09:56) (18 - 18)  SpO2: 94% (21 May 2023 09:56) (94% - 100%)    Parameters below as of 21 May 2023 09:56  Patient On (Oxygen Delivery Method): nasal cannula  O2 Flow (L/min): 3        05-20-23 @ 07:01  -  05-21-23 @ 07:00  --------------------------------------------------------  IN: 920 mL / OUT: 1500 mL / NET: -580 mL              LABS:                        10.3   13.77 )-----------( 317      ( 20 May 2023 07:52 )             34.6     05-20    136  |  101  |  34<H>  ----------------------------<  199<H>  3.7   |  32<H>  |  5.30<H>    Ca    9.3      20 May 2023 08:59                WBC:  WBC Count: 13.77 K/uL (05-20 @ 07:52)  WBC Count: 12.09 K/uL (05-19 @ 12:12)  WBC Count: 15.50 K/uL (05-18 @ 06:30)      MICROBIOLOGY:  RECENT CULTURES:  05-18 Pleural Fl Pleural Fluid XXXX   polymorphonuclear leukocytes seen  No organisms seen  by cytocentrifuge   Culture is being performed.    05-17 .Blood Blood-Peripheral XXXX XXXX   No growth to date.    05-17 .Blood Blood-Peripheral XXXX XXXX   No growth to date.                    Sodium:  Sodium, Serum: 136 mmol/L (05-20 @ 08:59)  Sodium, Serum: 133 mmol/L (05-19 @ 12:12)  Sodium, Serum: 134 mmol/L (05-18 @ 06:30)      5.30 mg/dL 05-20 @ 08:59  3.90 mg/dL 05-19 @ 12:12  5.40 mg/dL 05-18 @ 06:30      Hemoglobin:  Hemoglobin: 10.3 g/dL (05-20 @ 07:52)  Hemoglobin: 9.7 g/dL (05-19 @ 12:12)  Hemoglobin: 10.6 g/dL (05-18 @ 06:30)      Platelets: Platelet Count - Automated: 317 K/uL (05-20 @ 07:52)  Platelet Count - Automated: 299 K/uL (05-19 @ 12:12)  Platelet Count - Automated: 296 K/uL (05-18 @ 06:30)              RADIOLOGY & ADDITIONAL STUDIES:      MICROBIOLOGY:  RECENT CULTURES:  05-18 Pleural Fl Pleural Fluid XXXX   polymorphonuclear leukocytes seen  No organisms seen  by cytocentrifuge   Culture is being performed.    05-17 .Blood Blood-Peripheral XXXX XXXX   No growth to date.    05-17 .Blood Blood-Peripheral XXXX XXXX   No growth to date.

## 2023-05-21 NOTE — SWALLOW BEDSIDE ASSESSMENT ADULT - ADDITIONAL RECOMMENDATIONS
1. Minced and moist solids, mildly thick liquids.  2. This service to follow and assess diet tolerance as schedule permits.   3. Trial swallow therapy.   4. Aspiration precautions-if any s/s penetration/aspiration noted d/c PO & initiate NPO w/ SLP to re-assess

## 2023-05-21 NOTE — SWALLOW BEDSIDE ASSESSMENT ADULT - COMMENTS
Consult received. Chart reviewed. SLP attemptedx3 to see pt for assessment of swallow function. Pt received in bed on supplemental oxygen via nasal canula. Upon arrival, pt in lethargic state, consistent with [facility] [place] report. SLP attempted to arouse pt with sternal rub, facial touch, verbal prompts, and bed adjust but was not successful. Therefore, the oral and pharyngeal stage of swallow is unable to be assessed at this time. Recommending NPO at this time as pt is unable to follow simple one step commands and maintain level of alertness required for safe PO intake. Defer diet to MD at this time. MD advised to reconsult when pt status improves and pt able to maintain level of alertness required to participate.     Per charting, "71yo female bib ems with cough for 2 days. pt c/o dry cough, no fever, chills no sob, pt states she got dialysis yesterday, +smoking hx no other complaints RVP positive for enterovirus /Chest xray at [facility] showed L lung opacification .In ER found to have rapid afib and seen by cardiologist Admitted  to telemetry unit for monitoring , send 3 sets of cardiac enzymes to rule out acute coronary event, obtain ECHO to evaluate LVEF, cardiology consult, continue current management, O2 supply, anticoagulation plan as per cardiology consult chest CT that showed bilateral effusion more in left with compressive atelectasis vs consolidation. Palliative care consult requested ,to discuss advance directives and complete MOLST"    Chest CT: "Small to moderate right-sided pleural effusion and associated compressive   atelectasis. Moderate left-sided pleural effusion with atelectasis/airspace consolidation left lower lobe. Groundglass and airspace consolidation posterior segment left upper lobe, could reflect edema or superimposed infection."    Chest X-ray: "New small to moderate left pleural effusion with evidence of probable asymmetric pulmonary edema involving the left lung. No focal consolidation is seen."
Consult received. Chart reviewed. SLP attempted to see ptx2 for assessment of swallow function. Upon arrival, pt being transported off unit for dialysis. This service to assess swallow/diet as schedule permits.    Per charting, "71yo female bib ems with cough for 2 days. pt c/o dry cough, no fever, chills no sob, pt states she got dialysis yesterday, +smoking hx no other complaints RVP positive for enterovirus /Chest xray at [facility] showed L lung opacification .In ER found to have rapid afib and seen by cardiologist Admitted  to telemetry unit for monitoring , send 3 sets of cardiac enzymes to rule out acute coronary event, obtain ECHO to evaluate LVEF, cardiology consult, continue current management, O2 supply, anticoagulation plan as per cardiology consult chest CT that showed bilateral effusion more in left with compressive atelectasis vs consolidation. Palliative care consult requested ,to discuss advance directives and complete MOLST"    Chest CT: "Small to moderate right-sided pleural effusion and associated compressive atelectasis. Moderate left-sided pleural effusion with atelectasis/airspace consolidation left lower lobe. Groundglass and airspace consolidation posterior segment left upper lobe, could reflect edema or superimposed infection."    Chest X-ray: "New small to moderate left pleural effusion with evidence of probable asymmetric pulmonary edema involving the left lung. No focal consolidation is seen."
Consult received. Chart reviewed. SLP attempted to see pt for assessment of swallow function. Per RN Liz, pt off unit at IR and will be going for dialysis later on in the day. This service to assess swallow/diet as schedule permits.    Per charting, "71yo female bib ems with cough for 2 days. pt c/o dry cough, no fever, chills no sob, pt states she got dialysis yesterday, +smoking hx no other complaints RVP positive for enterovirus /Chest xray at [facility] showed L lung opacification .In ER found to have rapid afib and seen by cardiologist Admitted  to telemetry unit for monitoring , send 3 sets of cardiac enzymes to rule out acute coronary event, obtain ECHO to evaluate LVEF, cardiology consult, continue current management, O2 supply, anticoagulation plan as per cardiology consult chest CT that showed bilateral effusion more in left with compressive atelectasis vs consolidation. Palliative care consult requested ,to discuss advance directives and complete MOLST"    Chest CT: "Small to moderate right-sided pleural effusion and associated compressive   atelectasis. Moderate left-sided pleural effusion with atelectasis/airspace consolidation left lower lobe. Groundglass and airspace consolidation posterior segment left upper lobe, could reflect edema or superimposed infection."    Chest X-ray: "New small to moderate left pleural effusion with evidence of probable asymmetric pulmonary edema involving the left lung. No focal consolidation is seen."
Consult received, chart reviewed. Pt seen for assessment of swallow function this AM. Pt received awake in bed on supplemental oxygen via nasal canula, AxAxO-1 w/ cousin (Ludy) present. Pt p/w increased confusion noted throughout and demonstrated ability to follow simple commands. She reported 0/10 pain pre/post assessment. SLP discussed recommendations with pt, RN and MD notified. Bed lowered, pt left as received.     Per charting, "71yo female bib ems with cough for 2 days. pt c/o dry cough, no fever, chills no sob, pt states she got dialysis yesterday, +smoking hx no other complaints RVP positive for enterovirus /Chest xray at [facility] showed L lung opacification .In ER found to have rapid afib and seen by cardiologist Admitted  to telemetry unit for monitoring , send 3 sets of cardiac enzymes to rule out acute coronary event, obtain ECHO to evaluate LVEF, cardiology consult, continue current management, O2 supply, anticoagulation plan as per cardiology consult chest CT that showed bilateral effusion more in left with compressive atelectasis vs consolidation. Palliative care consult requested ,to discuss advance directives and complete MOLST"    Chest CT: "Small to moderate right-sided pleural effusion and associated compressive atelectasis. Moderate left-sided pleural effusion with atelectasis/airspace consolidation left lower lobe. Groundglass and airspace consolidation posterior segment left upper lobe, could reflect edema or superimposed infection."

## 2023-05-21 NOTE — SWALLOW BEDSIDE ASSESSMENT ADULT - ASR SWALLOW RECOMMEND DIAG
Not warranted at this time d/t overt s/s noted at bedside
Not indicated at this time as pt is unable to maintain level of alertness required to participate

## 2023-05-21 NOTE — PROGRESS NOTE ADULT - ASSESSMENT
cugj - pn - had zosyn and vancomycin  ashd - atrial; fib - rvr - had cardizem  ashd- s/p mi  s/p cabg  pvd- s/p bypass  left pl effusion- thoracentasis ?  dm2  esrd - on hd  atrial fib - on cardizem drip- rate controlled - may need oac - to discuss with family- to continue iv cardizem 5/18  pt had thoracentasis 1100 cc fluid - pt is converted to rsr   discussed with mino reese  risks and benefits of noac- pt is started on eliquis  dc cardizem - start po cardizem 5/19  discussed with Jenni  ref  alyce- other  health care proxy 5/19  dysphagia - gi consult

## 2023-05-22 PROCEDURE — 99233 SBSQ HOSP IP/OBS HIGH 50: CPT

## 2023-05-22 RX ADMIN — APIXABAN 2.5 MILLIGRAM(S): 2.5 TABLET, FILM COATED ORAL at 17:04

## 2023-05-22 RX ADMIN — Medication 50 MILLIGRAM(S): at 11:45

## 2023-05-22 RX ADMIN — PANTOPRAZOLE SODIUM 40 MILLIGRAM(S): 20 TABLET, DELAYED RELEASE ORAL at 05:11

## 2023-05-22 RX ADMIN — SENNA PLUS 2 TABLET(S): 8.6 TABLET ORAL at 21:28

## 2023-05-22 RX ADMIN — Medication 3: at 12:35

## 2023-05-22 RX ADMIN — Medication 0.1 MILLIGRAM(S): at 05:11

## 2023-05-22 RX ADMIN — Medication 60 MILLIGRAM(S): at 11:43

## 2023-05-22 RX ADMIN — Medication 1 TABLET(S): at 11:43

## 2023-05-22 RX ADMIN — Medication 1: at 16:55

## 2023-05-22 RX ADMIN — Medication 81 MILLIGRAM(S): at 11:43

## 2023-05-22 RX ADMIN — MIRTAZAPINE 7.5 MILLIGRAM(S): 45 TABLET, ORALLY DISINTEGRATING ORAL at 21:28

## 2023-05-22 RX ADMIN — Medication 2: at 07:39

## 2023-05-22 RX ADMIN — Medication 60 MILLIGRAM(S): at 05:11

## 2023-05-22 RX ADMIN — APIXABAN 2.5 MILLIGRAM(S): 2.5 TABLET, FILM COATED ORAL at 05:11

## 2023-05-22 RX ADMIN — Medication 60 MILLIGRAM(S): at 21:28

## 2023-05-22 RX ADMIN — Medication 100 MILLIGRAM(S): at 17:00

## 2023-05-22 RX ADMIN — Medication 2.5 MILLIGRAM(S): at 17:00

## 2023-05-22 RX ADMIN — Medication 0.1 MILLIGRAM(S): at 17:04

## 2023-05-22 RX ADMIN — Medication 2.5 MILLIGRAM(S): at 05:12

## 2023-05-22 RX ADMIN — Medication 100 MILLIGRAM(S): at 05:11

## 2023-05-22 NOTE — CHART NOTE - NSCHARTNOTEFT_GEN_A_CORE
Assessment: Pt seen for nutrition follow-up. Chart reviewed, hospital course noted.    Brief hx: Pt is a "71yo woman with PMH of HTN, DM2, ESRD on HD, CAD, depression/anxiety and former smoker was seen in ED with cough for 2 days."    Visited pt at bedside this am. Pt alert/confused during visit. Pt previously NPO. Seen for swallow evaluation this am- SLP recommended Minced and moist solids, mildly thick liquids. Diet now advanced. No po intakes thus far. No N/V/D/C per chart review. No BMs thus far; bowel regimen rx. S/p HD yesterday 5/20; K WNL, last phos high (4.8) on 5/18. Recommend assistance and encouragement at meal times. Will provide Nepro shakes for additional kcal/protein.     Factors impacting intake: [ ] none [ ] nausea  [ ] vomiting [ ] diarrhea [ ] constipation  [ ]chewing problems [ ] swallowing issues  [ ] other:     Diet Prescription: Diet, Minced and Moist:   Consistent Carbohydrate {Evening Snack}  Mildly Thick Liquids (MILDTHICKLIQS)  For patients receiving Renal Replacement - No Protein Restr, No Conc K, No Conc Phos, Low Sodium (RENAL) (05-21-23 @ 12:07) [Active]    Intake: diet advanced today    Current Weight: Weight (kg): 59 (05-17 @ 08:32), 5/21 123.6# (none noted)  % Weight Change    Pertinent Medications: MEDICATIONS  (STANDING):  apixaban 2.5 milliGRAM(s) Oral two times a day  aspirin enteric coated 81 milliGRAM(s) Oral daily  cefTRIAXone   IVPB 1000 milliGRAM(s) IV Intermittent every 24 hours  cloNIDine 0.1 milliGRAM(s) Oral two times a day  dextrose 5%. 1000 milliLiter(s) (100 mL/Hr) IV Continuous <Continuous>  dextrose 5%. 1000 milliLiter(s) (50 mL/Hr) IV Continuous <Continuous>  dextrose 50% Injectable 12.5 Gram(s) IV Push once  dextrose 50% Injectable 25 Gram(s) IV Push once  dextrose 50% Injectable 25 Gram(s) IV Push once  diltiazem    Tablet 60 milliGRAM(s) Oral three times a day  dronabinol 2.5 milliGRAM(s) Oral two times a day before meals  glucagon  Injectable 1 milliGRAM(s) IntraMuscular once  imipramine 50 milliGRAM(s) Oral daily  insulin lispro (ADMELOG) corrective regimen sliding scale   SubCutaneous three times a day before meals  metoprolol tartrate 100 milliGRAM(s) Oral two times a day  mirtazapine 7.5 milliGRAM(s) Oral at bedtime  multivitamin 1 Tablet(s) Oral daily  pantoprazole   Suspension 40 milliGRAM(s) Oral before breakfast  senna 2 Tablet(s) Oral at bedtime    MEDICATIONS  (PRN):  acetaminophen     Tablet .. 650 milliGRAM(s) Oral every 6 hours PRN Temp greater or equal to 38C (100.4F), Mild Pain (1 - 3)  aluminum hydroxide/magnesium hydroxide/simethicone Suspension 30 milliLiter(s) Oral every 4 hours PRN Dyspepsia  dextrose Oral Gel 15 Gram(s) Oral once PRN Blood Glucose LESS THAN 70 milliGRAM(s)/deciliter  ondansetron Injectable 4 milliGRAM(s) IV Push every 8 hours PRN Nausea and/or Vomiting    Pertinent Labs: 05-21 Na134 mmol/L<L> Glu 307 mg/dL<H> K+ 3.6 mmol/L Cr  3.50 mg/dL<H> BUN 22 mg/dL 05-18 Phos 4.8 mg/dL<H> 05-18 Alb 2.7 g/dL<L>    CAPILLARY BLOOD GLUCOSE  POCT Blood Glucose.: 282 mg/dL (21 May 2023 11:12)  POCT Blood Glucose.: 253 mg/dL (21 May 2023 07:37)  POCT Blood Glucose.: 384 mg/dL (20 May 2023 21:23)  POCT Blood Glucose.: 173 mg/dL (20 May 2023 17:06)  POCT Blood Glucose.: 112 mg/dL (20 May 2023 13:41)    Skin: stage II pressure ulcer to sacrum    Estimated Needs:   [X] no change since previous assessment: based on #/58.9kg  30-35kcal/kg (1767-2061kcal)  1.2-1.4g pro/kg (71-82gm protein)  [ ] recalculated:     Previous Nutrition Diagnosis:   [ ] Inadequate Energy Intake [ ]Inadequate Oral Intake [ ] Excessive Energy Intake   [ ] Underweight [X] Increased Nutrient Needs [ ] Overweight/Obesity   [ ] Altered GI Function [ ] Unintended Weight Loss [ ] Food & Nutrition Related Knowledge Deficit [X] Malnutrition (moderate, chronic)    Nutrition Diagnosis is [X] ongoing  [ ] resolved [ ] not applicable     New Nutrition Diagnosis: [X] not applicable     Interventions:   Recommend  [ ] Change Diet To:  [X] Nutrition Supplement: Nepro TID  [ ] Nutrition Support  [X] Other: Continue current diet as ordered; assistance/encouragement at meal times  Recommend NephroVite daily     Monitoring and Evaluation:   [ ] PO intake [ x ] Tolerance to diet prescription [ x ] weights [ x ] labs[ x ] follow up per protocol  [X] other: s/s GI distress, bowel function, skin integrity/ edema
- RN called informing that patient IV access got blocked and unable to give IV med.  - RN tried to gain another access but was unsuccessful,  ED nurse was requested who failed as well  - ICU nurse tried to gain access and was unsuccessful and lastly ICU PA tried under ultrasound guidance and was unsuccessful.    - Patient is on 5 day course of Ceftriaxone for Pneumonia and was suppose to get his last dose last night.   - Patient likely will require PICC line to gain IV access and to complete the last dose of Ceftriaxone.

## 2023-05-22 NOTE — PROGRESS NOTE ADULT - SUBJECTIVE AND OBJECTIVE BOX
[facility]  INFECTIOUS DISEASES   [address] [place] [address]  Tel: [phone]     Fax: [phone]  ========================================================  MD Zita Alejandra Kaushal, MD Cho, Michelle, MD Sunjit, Jaspal, MD  ========================================================    [id number]  SHILO KOHLER     Follow up: Pneumonia    Lying in bed, looks comfortable, awake and alert, NAD. No complaint.  Had thoracentesis on 5/18, transudate.     PAST MEDICAL & SURGICAL HISTORY:  HTN (hypertension)  h/o Anxiety attack  Depression  h/o Myocardial infarct 2007  h/o Hepatitis A 1969  currently resolved, no symptoms  Murmur, cardiac  h/o Smoking  quitted 3/2012  Anemia secondary to renal failure  ESRD on dialysis  Falls  Ataxia  Type 2 diabetes mellitus  Peripheral vascular disease, unspecified  CAD (coronary artery disease)  Diabetes  coronary stent 2007  s/p Ovarian cyst removal  s/p surgical removal of benign Skin lesion epigastric area  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    Social Hx: Former smoker, no ETOH or drugs     FAMILY HISTORY:  FH: myocardial infarction (Father)    FH: myocardial infarction (Father)    Family history of type 2 diabetes mellitus in brother (Sibling)    Allergies  No Known Drug Allergies  latex (Hives)    MEDICATIONS  (STANDING):  aspirin enteric coated 81 milliGRAM(s) Oral daily  atorvastatin 40 milliGRAM(s) Oral at bedtime  cefTRIAXone   IVPB 1000 milliGRAM(s) IV Intermittent every 24 hours  cloNIDine 0.1 milliGRAM(s) Oral two times a day  dextrose 5%. 1000 milliLiter(s) (50 mL/Hr) IV Continuous <Continuous>  dextrose 5%. 1000 milliLiter(s) (100 mL/Hr) IV Continuous <Continuous>  dextrose 50% Injectable 12.5 Gram(s) IV Push once  dextrose 50% Injectable 25 Gram(s) IV Push once  dextrose 50% Injectable 25 Gram(s) IV Push once  diltiazem Infusion 5 mG/Hr (5 mL/Hr) IV Continuous <Continuous>  dronabinol 2.5 milliGRAM(s) Oral two times a day before meals  gabapentin 100 milliGRAM(s) Oral at bedtime  glucagon  Injectable 1 milliGRAM(s) IntraMuscular once  heparin   Injectable 5000 Unit(s) SubCutaneous every 12 hours  imipramine 50 milliGRAM(s) Oral daily  insulin lispro (ADMELOG) corrective regimen sliding scale   SubCutaneous three times a day before meals  melatonin 3 milliGRAM(s) Oral at bedtime  metoprolol tartrate 100 milliGRAM(s) Oral two times a day  multivitamin 1 Tablet(s) Oral daily  pantoprazole    Tablet 40 milliGRAM(s) Oral before breakfast  risperiDONE   Tablet 0.5 milliGRAM(s) Oral at bedtime  senna 2 Tablet(s) Oral at bedtime     REVIEW OF SYSTEMS:  CONSTITUTIONAL:  No Fever or chills  HEENT:  No diplopia or blurred vision.  No sore throat or runny nose.  CARDIOVASCULAR:  No chest pain or SOB.  RESPIRATORY:  No cough, shortness of breath, PND or orthopnea.  GASTROINTESTINAL:  No nausea, vomiting or diarrhea.  GENITOURINARY:  No dysuria, frequency or urgency. No Blood in urine  MUSCULOSKELETAL:  no joint aches, no muscle pain  SKIN:  No change in skin, hair or nails.  NEUROLOGIC:  No paresthesias or weakness.  PSYCHIATRIC:  No disorder of thought or mood.  ENDOCRINE:  No heat or cold intolerance, polyuria or polydipsia.  HEMATOLOGICAL:  No easy bruising or bleeding.     Physical Exam:  Vital Signs Last 24 Hrs  T(C): 36.7 (22 May 2023 10:36), Max: 36.7 (22 May 2023 10:36)  T(F): 98 (22 May 2023 10:36), Max: 98 (22 May 2023 10:36)  HR: 83 (22 May 2023 10:50) (63 - 83)  BP: 154/74 (22 May 2023 10:50) (135/71 - 154/74)  BP(mean): --  RR: 19 (22 May 2023 10:36) (18 - 19)  SpO2: 94% (22 May 2023 10:50) (89% - 97%)  Parameters below as of 22 May 2023 10:50  Patient On (Oxygen Delivery Method): nasal cannula  O2 Flow (L/min): 3  GEN: NAD  HEENT: normocephalic and atraumatic. EOMI. PERRL.    NECK: Supple.  No lymphadenopathy   LUNGS: Decreased breath sounds bilaterally more in lower lobes  with some crackles both side   HEART: Regular rate and rhythm   ABDOMEN: Soft, nontender, and nondistended.  Positive bowel sounds.    : No CVA tenderness  EXTREMITIES: Without edema.  NEUROLOGIC: grossly intact.  PSYCHIATRIC: Appropriate affect .  SKIN: No rash     Labs:                        9.6    11.69 )-----------( 355      ( 21 May 2023 11:13 )             31.4     05-21    134<L>  |  99  |  22  ----------------------------<  307<H>  3.6   |  29  |  3.50<H>    Ca    8.4<L>      21 May 2023 11:13    Culture - Fungal, Body Fluid (collected 05-18-23 @ 13:10)  Source: Pleural Fl Pleural Fluid    Culture - Body Fluid with Gram Stain (collected 05-18-23 @ 13:10)  Source: Pleural Fl Pleural Fluid  Gram Stain (05-18-23 @ 22:17):    polymorphonuclear leukocytes seen    No organisms seen    by cytocentrifuge    Culture - Acid Fast - Body Fluid w/Smear (collected 05-18-23 @ 13:10)  Source: Pleural Fl Pleural Fluid    Culture - Blood (collected 05-17-23 @ 09:42)  Source: .Blood Blood-Peripheral    Culture - Blood (collected 05-17-23 @ 09:40)  Source: .Blood Blood-Peripheral    WBC Count: 11.69 K/uL (05-21-23 @ 11:13)  WBC Count: 13.77 K/uL (05-20-23 @ 07:52)  WBC Count: 12.09 K/uL (05-19-23 @ 12:12)  WBC Count: 15.50 K/uL (05-18-23 @ 06:30)    Creatinine, Serum: 3.50 mg/dL (05-21-23 @ 11:13)  Creatinine, Serum: 5.30 mg/dL (05-20-23 @ 08:59)  Creatinine, Serum: 3.90 mg/dL (05-19-23 @ 12:12)  Creatinine, Serum: 5.40 mg/dL (05-18-23 @ 06:30)    Procalcitonin, Serum: 0.86 ng/mL (05-18-23 @ 06:30)  Procalcitonin, Serum: 0.90 ng/mL (05-17-23 @ 09:40)     SARS-CoV-2: NotDetec (05-17-23 @ 09:40)    All imaging and other data have been reviewed.  < from: CT Chest No Cont (05.17.23 @ 10:15) >  IMPRESSION:  Small to moderate right-sided pleural effusion and associated compressive   atelectasis.  Moderate left-sided pleural effusion with atelectasis/airspace   consolidation left lower lobe.  Groundglass and airspace consolidation posterior segment left upper lobe,   could reflect edema or superimposed infection.  Other findings as discussed above.    Assessment and Plan:   71yo woman with PMH of HTN, DM2, ESRD on HD, CAD, depression/anxiety and former smoker was seen in ED with cough for 2 days.   RVP positive for enterovirus  chest CT that showed bilateral effusion more in left with compressive atelectasis vs consolidation.   Procalcitonin 0.9  WBC on admission 12k  Tmax 99.1  RVP enterovirus +   5/18 left thoracentesis looks transudate, culture NGTD    Afebrile, no complaint, still on o2 with NC 3L. No new event in last few days.     Recommendations:  - Leukocytosis improving 15-->11 will monitor    - Blood cultures NGTD  - Pleural fluid culture NGTD  - Completed 5days of ceftriaxone 1gm daily on 5/21, can watch off antibiotics for now.    - Will treat for total 5days if remains stable     Will follow PRN.    Geovany Long MD  Division of Infectious Diseases   Please call ID service at [phone] with any question.    35 minutes spent on total encounter assessing patient, examination, chart review, counseling and coordinating care by the attending physician/nurse/care manager.

## 2023-05-22 NOTE — PROGRESS NOTE ADULT - SUBJECTIVE AND OBJECTIVE BOX
Patient is a 72y Female whom presented to the hospital with esrd on hd     PAST MEDICAL & SURGICAL HISTORY:  HTN (hypertension)      h/o Anxiety attack      Depression      h/o Myocardial infarct 2007      h/o Hepatitis A 1969  currently resolved, no symptoms      Murmur, cardiac      h/o Smoking  quitted 3/2012      Anemia secondary to renal failure      ESRD on dialysis      Falls      Ataxia      Type 2 diabetes mellitus      Peripheral vascular disease, unspecified      CAD (coronary artery disease)      Diabetes      coronary stent 2007      s/p Ovarian cyst removal      s/p surgical removal of benign Skin lesion epigastric area      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          MEDICATIONS  (STANDING):  aspirin enteric coated 81 milliGRAM(s) Oral daily  atorvastatin 40 milliGRAM(s) Oral at bedtime  cefTRIAXone   IVPB 1000 milliGRAM(s) IV Intermittent every 24 hours  cloNIDine 0.1 milliGRAM(s) Oral two times a day  dextrose 5%. 1000 milliLiter(s) (50 mL/Hr) IV Continuous <Continuous>  dextrose 5%. 1000 milliLiter(s) (100 mL/Hr) IV Continuous <Continuous>  dextrose 50% Injectable 12.5 Gram(s) IV Push once  dextrose 50% Injectable 25 Gram(s) IV Push once  dextrose 50% Injectable 25 Gram(s) IV Push once  diltiazem Infusion 5 mG/Hr (5 mL/Hr) IV Continuous <Continuous>  gabapentin 100 milliGRAM(s) Oral at bedtime  glucagon  Injectable 1 milliGRAM(s) IntraMuscular once  heparin   Injectable 5000 Unit(s) SubCutaneous every 12 hours  imipramine 50 milliGRAM(s) Oral daily  insulin lispro (ADMELOG) corrective regimen sliding scale   SubCutaneous three times a day before meals  melatonin 3 milliGRAM(s) Oral at bedtime  metoprolol tartrate 100 milliGRAM(s) Oral two times a day  multivitamin 1 Tablet(s) Oral daily  pantoprazole    Tablet 40 milliGRAM(s) Oral before breakfast  risperiDONE   Tablet 0.5 milliGRAM(s) Oral at bedtime  senna 2 Tablet(s) Oral at bedtime      Allergies    No Known Drug Allergies  latex (Hives)    Intolerances        SOCIAL HISTORY:  Denies ETOh,Smoking,     FAMILY HISTORY:  FH: myocardial infarction (Father)    FH: myocardial infarction (Father)    Family history of type 2 diabetes mellitus in brother (Sibling)        REVIEW OF SYSTEMS:    CONSTITUTIONAL: No weakness, fevers or chills  EYES/ENT: No visual changes;  no throat pain   NECK: No pain or stiffness  RESPIRATORY: No cough, wheezing, hemoptysis; No shortness of breath  CARDIOVASCULAR: No chest pain or palpitations  GASTROINTESTINAL: No abdominal or epigastric pain. No nausea, vomiting,     No diarrhea or constipation. No melena   GENITOURINARY: No dysuria, frequency or hematuria  NEUROLOGICAL: No numbness or weakness  SKIN: dry                                                                    9.6    11.69 )-----------( 355      ( 21 May 2023 11:13 )             31.4       CBC Full  -  ( 21 May 2023 11:13 )  WBC Count : 11.69 K/uL  RBC Count : 3.16 M/uL  Hemoglobin : 9.6 g/dL  Hematocrit : 31.4 %  Platelet Count - Automated : 355 K/uL  Mean Cell Volume : 99.4 fl  Mean Cell Hemoglobin : 30.4 pg  Mean Cell Hemoglobin Concentration : 30.6 gm/dL  Auto Neutrophil # : x  Auto Lymphocyte # : x  Auto Monocyte # : x  Auto Eosinophil # : x  Auto Basophil # : x  Auto Neutrophil % : x  Auto Lymphocyte % : x  Auto Monocyte % : x  Auto Eosinophil % : x  Auto Basophil % : x      05-21    134<L>  |  99  |  22  ----------------------------<  307<H>  3.6   |  29  |  3.50<H>    Ca    8.4<L>      21 May 2023 11:13        CAPILLARY BLOOD GLUCOSE      POCT Blood Glucose.: 282 mg/dL (21 May 2023 11:12)  POCT Blood Glucose.: 253 mg/dL (21 May 2023 07:37)  POCT Blood Glucose.: 384 mg/dL (20 May 2023 21:23)  POCT Blood Glucose.: 173 mg/dL (20 May 2023 17:06)  POCT Blood Glucose.: 112 mg/dL (20 May 2023 13:41)      Vital Signs Last 24 Hrs  T(C): 36.6 (21 May 2023 09:56), Max: 37.2 (20 May 2023 20:27)  T(F): 97.9 (21 May 2023 09:56), Max: 99 (20 May 2023 20:27)  HR: 68 (21 May 2023 09:56) (68 - 83)  BP: 151/64 (21 May 2023 09:56) (145/70 - 163/72)  BP(mean): --  RR: 18 (21 May 2023 09:56) (18 - 18)  SpO2: 94% (21 May 2023 09:56) (94% - 100%)    Parameters below as of 21 May 2023 09:56  Patient On (Oxygen Delivery Method): nasal cannula  O2 Flow (L/min): 3                  PHYSICAL EXAM:    Constitutional: NAD  HEENT: conjunctive   clear   Neck:  No JVD  Respiratory: CTAB  Cardiovascular: S1 and S2  Gastrointestinal: BS+, soft, NT/ND  Extremities: No peripheral edema  Neurological:  no focal deficits  Psychiatric: Normal mood, normal affect  : No Renteria  Skin: No rashes  Access: Not applicable       Patient is a 72y Female whom presented to the hospital with esrd on hd ,    PAST MEDICAL & SURGICAL HISTORY:  HTN (hypertension)      h/o Anxiety attack      Depression      h/o Myocardial infarct 2007      h/o Hepatitis A 1969  currently resolved, no symptoms      Murmur, cardiac      h/o Smoking  quitted 3/2012      Anemia secondary to renal failure      ESRD on dialysis      Falls      Ataxia      Type 2 diabetes mellitus      Peripheral vascular disease, unspecified      CAD (coronary artery disease)      Diabetes      coronary stent 2007      s/p Ovarian cyst removal      s/p surgical removal of benign Skin lesion epigastric area      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          MEDICATIONS  (STANDING):  aspirin enteric coated 81 milliGRAM(s) Oral daily  atorvastatin 40 milliGRAM(s) Oral at bedtime  cefTRIAXone   IVPB 1000 milliGRAM(s) IV Intermittent every 24 hours  cloNIDine 0.1 milliGRAM(s) Oral two times a day  dextrose 5%. 1000 milliLiter(s) (50 mL/Hr) IV Continuous <Continuous>  dextrose 5%. 1000 milliLiter(s) (100 mL/Hr) IV Continuous <Continuous>  dextrose 50% Injectable 12.5 Gram(s) IV Push once  dextrose 50% Injectable 25 Gram(s) IV Push once  dextrose 50% Injectable 25 Gram(s) IV Push once  diltiazem Infusion 5 mG/Hr (5 mL/Hr) IV Continuous <Continuous>  gabapentin 100 milliGRAM(s) Oral at bedtime  glucagon  Injectable 1 milliGRAM(s) IntraMuscular once  heparin   Injectable 5000 Unit(s) SubCutaneous every 12 hours  imipramine 50 milliGRAM(s) Oral daily  insulin lispro (ADMELOG) corrective regimen sliding scale   SubCutaneous three times a day before meals  melatonin 3 milliGRAM(s) Oral at bedtime  metoprolol tartrate 100 milliGRAM(s) Oral two times a day  multivitamin 1 Tablet(s) Oral daily  pantoprazole    Tablet 40 milliGRAM(s) Oral before breakfast  risperiDONE   Tablet 0.5 milliGRAM(s) Oral at bedtime  senna 2 Tablet(s) Oral at bedtime      Allergies    No Known Drug Allergies  latex (Hives)    Intolerances        SOCIAL HISTORY:  Denies ETOh,Smoking,     FAMILY HISTORY:  FH: myocardial infarction (Father)    FH: myocardial infarction (Father)    Family history of type 2 diabetes mellitus in brother (Sibling)        REVIEW OF SYSTEMS:    CONSTITUTIONAL: No weakness, fevers or chills  EYES/ENT: No visual changes;  no throat pain   NECK: No pain or stiffness  RESPIRATORY: No cough, wheezing, hemoptysis; No shortness of breath  CARDIOVASCULAR: No chest pain or palpitations  GASTROINTESTINAL: No abdominal or epigastric pain. No nausea, vomiting,     No diarrhea or constipation. No melena   GENITOURINARY: No dysuria, frequency or hematuria  NEUROLOGICAL: No numbness or weakness  SKIN: dry                                                                    9.6    11.69 )-----------( 355      ( 21 May 2023 11:13 )             31.4       CBC Full  -  ( 21 May 2023 11:13 )  WBC Count : 11.69 K/uL  RBC Count : 3.16 M/uL  Hemoglobin : 9.6 g/dL  Hematocrit : 31.4 %  Platelet Count - Automated : 355 K/uL  Mean Cell Volume : 99.4 fl  Mean Cell Hemoglobin : 30.4 pg  Mean Cell Hemoglobin Concentration : 30.6 gm/dL  Auto Neutrophil # : x  Auto Lymphocyte # : x  Auto Monocyte # : x  Auto Eosinophil # : x  Auto Basophil # : x  Auto Neutrophil % : x  Auto Lymphocyte % : x  Auto Monocyte % : x  Auto Eosinophil % : x  Auto Basophil % : x      05-21    134<L>  |  99  |  22  ----------------------------<  307<H>  3.6   |  29  |  3.50<H>    Ca    8.4<L>      21 May 2023 11:13        CAPILLARY BLOOD GLUCOSE      POCT Blood Glucose.: 282 mg/dL (21 May 2023 11:12)  POCT Blood Glucose.: 253 mg/dL (21 May 2023 07:37)  POCT Blood Glucose.: 384 mg/dL (20 May 2023 21:23)  POCT Blood Glucose.: 173 mg/dL (20 May 2023 17:06)  POCT Blood Glucose.: 112 mg/dL (20 May 2023 13:41)      Vital Signs Last 24 Hrs  T(C): 36.6 (21 May 2023 09:56), Max: 37.2 (20 May 2023 20:27)  T(F): 97.9 (21 May 2023 09:56), Max: 99 (20 May 2023 20:27)  HR: 68 (21 May 2023 09:56) (68 - 83)  BP: 151/64 (21 May 2023 09:56) (145/70 - 163/72)  BP(mean): --  RR: 18 (21 May 2023 09:56) (18 - 18)  SpO2: 94% (21 May 2023 09:56) (94% - 100%)    Parameters below as of 21 May 2023 09:56  Patient On (Oxygen Delivery Method): nasal cannula  O2 Flow (L/min): 3                  PHYSICAL EXAM:    Constitutional: NAD  HEENT: conjunctive   clear   Neck:  No JVD  Respiratory: CTAB  Cardiovascular: S1 and S2  Gastrointestinal: BS+, soft, NT/ND  Extremities: No peripheral edema  Neurological:  no focal deficits  Psychiatric: Normal mood, normal affect  : No Renteria  Skin: No rashes  Access: Not applicable

## 2023-05-22 NOTE — PHYSICAL THERAPY INITIAL EVALUATION ADULT - CRITERIA FOR SKILLED THERAPEUTIC INTERVENTIONS
impairments found/anticipated discharge recommendation impairments found/functional limitations in following categories/rehab potential/therapy frequency

## 2023-05-22 NOTE — PHYSICAL THERAPY INITIAL EVALUATION ADULT - SITTING BALANCE: STATIC
Patient Education     Benign Prostatic Hyperplasia    The prostate is a small gland that in men that makes a fluid that goes into semen. As you age, the prostate grows. If it becomes too big, it may cause problems with urination. This condition is called benign prostatic hyperplasia (BPH). BPH is not a cancer.  Symptoms of BPH  BPH is common in men over age 60. That’s because the prostate grows bigger during a man’s life. As it grows, it presses against the urethra. The urethra is the tube that carries urine out of your body from your bladder through your penis. Your bladder may also weaken as you age. It may not empty completely after you urinate.  Men with BPH may have these symptoms:  · The urge to frequently urinate, especially at night  · Leaking or dribbling of urine  · A weak stream of urine  · Not able to urinate, or having trouble starting to urinate  Diagnosing BPH  BPH can hurt your bladder and kidneys. It can also lead to bladder stones and urinary tract infections. If you think you may have BPH, talk with your healthcare provider. Early treatment can prevent problems.  Several tests can diagnose BPH. These include:  · Digital rectal exam. During this procedure, your provider puts a gloved, greased (lubricated) finger into your rectum to check the size of your prostate.  · Imaging tests. X-rays and other imaging tests can find problems in your kidneys or bladder.  · Cystoscopy. This test uses a flexible tube with a camera (called a scope). The scope is passed up the urethra to look inside your urinary tract.  · Urine flow study. This test uses a special device to see how fast urine leaves your body.  · Prostate ultrasound. This test uses sound waves to view the size and inside of the prostate gland.  Treating BPH  If you have mild symptoms, you may not need treatment. You may be able to control your BPH with lifestyle changes. Some men feel better if they limit or avoid alcohol and caffeinated drinks  like coffee. Not drinking too many fluids at night can also help. Increasing your physical activity may ease symptoms, too.  Kegel exercises may also help. They strengthen the pelvic muscle to prevent urine from leaking. While urinating, contract your pelvic muscle to stop or slow down the flow of urine. Hold for 10 seconds. Repeat at least 5 times. Do the exercise 3 to 5 times each day.  Certain medicines can worsen BPH symptoms. These include medicines for congestion, allergies, and depression. Medicines that increase your urine flow (diuretics or water pills) can also worsen BPH symptoms. If you take any of these, talk with your provider. You may need to take another medicine or change how much you take.  BPH symptoms often get worse as the prostate grows. So at some point you may need treatment. Your provider may prescribe medicine to shrink the prostate or stop its growth. Other treatments can make the urethra wider to let urine flow more easily. There are also some minimally invasive techniques to remove prostate tissue.  If your BPH is severe, your healthcare provider may recommend surgery. Surgery takes out enlarged parts of the prostate gland. Your healthcare provider can figure out the best option for you based on your age, overall health, and other factors.  BPH and prostate cancer  BPH and prostate cancer share some symptoms. That’s why it’s important to talk with your provider about your symptoms. Men with BPH aren’t more likely to develop this cancer. But they may have higher levels of the prostate-specific antigen (PSA). A higher PSA level may also be a sign of prostate cancer. Certain tests help tell BPH from prostate cancer. They include prostate ultrasound and biopsy.  Date Last Reviewed: 6/1/2017 © 2000-2018 [employer]. [address] [place] [address], [place] [address]. All rights reserved. This information is not intended as a substitute for professional medical care. Always follow your  healthcare professional's instructions.            good balance

## 2023-05-22 NOTE — OCCUPATIONAL THERAPY INITIAL EVALUATION ADULT - ADDITIONAL COMMENTS
Pt is an unreliable historian. As per EMR, patient is a LTC resident at [facility]. Pt ambulated using a rolling walker with assistance and required assistance with ADLs.  Pt performed sit to stand and ambulated 4' using RW with mod assist x 1 +1. Pt requires assistance with ADL's and transfers due to decreased strength, impaired cognition, decreased endurance and impaired sitting/standing balance.

## 2023-05-22 NOTE — PROGRESS NOTE ADULT - ASSESSMENT
REASON FOR VISIT  .. Management of problems listed below      NOTEWORTHY FINDINGS.     PHYSICAL EXAM    HEENT Unremarkable  atraumatic   RESP Fair air entry  Harsh breath sound   CARDIAC S1 S2 No S3     NO JVD    ABDOMEN No hepatosplenomegaly   PEDAL EDEMA present No calf tenderness  NO rash       BEST PRACTICE ISSUES.    HOB ELEVATN.   .. Yes  DVT PPLX.   .. 5/19/2023 apixaba 2.5 x 2 (af)   ..    5/17-5/19/2023 HPSC   ELDER PPLX.   ..    5/17/2023 PROTONIX 40   INFN PPLX.   ..    SP SW AMINAH.    .. 5/21/2023 aminah mince moist   ..  5/19/2023 aminah npo       DIET.    .. 5/21/2023 mince moist   ..  5/17/2023 RENAL   IV fl.  ..      PROCEDURES.  .. 5/18/2023 l thoracentesis 1.5l removd sharan colored   PAST PROCEDURES.    ..     GENERAL DATA .   GOC.    ..    5/19/2023 dnr   ALLGY.  ..    latex                     WT.  ..  5/17/2023 59  BMI.  ..   5/17/2023 19                 ICU STAY.   .. none    DRIPS.  .. CARD 5/- 5/19     ABGS.    VS/ PO/IO/ VENT/ DRIPS.  5/22/2023 afeb 70 150/70   5/22/2023 3l 94%    PROBLEM/ASSESSMENT/PLAN.  PULM.  . GAS EXCHANGE.  .. Target po 90-95%   . PLEURAL EFFUSION.   . 5/18/2023 s prot 6.4   .. cxr cw 3/7/2023 new sm to mod l effsn   .. ct ch nc 5/17/2023 ct ch  .... Sm to mod r effs  .... mod l pl effs with atelectasis consoliatn l lo lobe   .... ground glass and airspace consolidn post segment chaz   .. 5/17/2023 Pl effsn may be sec to esrd chf or parapneumonia   .. cxr 5/20/2023 improvd l aeratn   .. effusn likely sec to esrd  . PLEURAL FLUID ANALYSIS.  .. 5/18/2023 l thorac 1.5 l   ..5/18 pl fl l 13 m 73 p 5 afb sm (-) g 131 l 102/268 (.38) ph 7.6 pr 2.6/6.4 (.4)  .. Fluid transudate    INFECTION.  . ENTERORHINOVIRUS INFECTION RESP TRACT .  . PNEUMONIA.    .. W 5/17-5/18-5/19-5/21/2023 w 12.1-15.5 - 12 - 11  .. pr 5/17/2023 pr .9   .. rvp 5/17/2023 enterorhinovirus   .. 5/18 pl fl c (-)   . 5/17 bc (-)   .. 5/17/2023 ROCEPHIN x 5d Dr CHEEK    .. Manage viral infection with supp care   .. Empiric antibio started by id to cover superimposed bact pneumonia cap or aspiration in setting of esrd    CARDIAC.  .. HYTN.  .. 5/17/2023 CLONIDINE .1 X 2   . CAD.  .. tr 5/17/2023 tr 56  .. 5/17/2023 ASA 81   .. 5/17/2023 ATORVASTAT 40   . A FIB RVR 5/17/2023   .. 5/19/2023 CARDIZEM 60.3   .. 5/17-5/19/2023 4P CARDIZEM DRIP 125  5/H  .. 5/17/2023 METOPROLOL 100.2   .. 5/19/2023 apixaba 2.5 x 2 (af) Dr Yun   . CHF.  .. bnp bnp 175k     HEMAT.  . ANEMIA  .. Hb 5/17-5/18-5/19-5/21/2023 Hb 10.5- 10.6 - 9.7 - 9.6  .. inr 5/17/2023 inr 125     GI.  . DYSPHAGIA   .. Sp 5/21/2023 mince moist aminah    RENAL.  . ESRD  .. Na 5/17/2023 na 133   .. cr 5/17/2023 cr 4.6   .. HD     NEURO.  .. 5/17/2023 GABAPENTIN 100  .. 5/17/2023 IMIPRAMINE 50   .. 5/17/2023 RISPERIDAL .5       OVERALL.  . 72-year-old female former smoker with history of A-fib (not on ac sec fall risk, diabetes, hypertension, hyperlipidemia, ESRD hemodialysis, CHF, CAD/CABG, PVD was sent from [facility] 5/17/2023 with cough for 2 days. pt c/o dry cough, no fever, chills no sob, pt states she got dialysis 5/16/2023 ,   .. Pt was recently admitted 3/7-3/11/2023 and before that 2/22-2/25/2023   .. 5/17/2023 No antibio use last 3 m   .. Pt admitted 5/17/2023 Pulm consulted     COURSE   . Found to have enterorhinovirus and large l effs  . 5/18/2023 l thora done      ACTIVE PROBLEMS .  . ENTERORHINOVIRUS INFECTION RESP TRACT 5/17  . PLEURAL EFFUSION SP l THORA 5/18 TRANSUDATE   . PNEUMONIA 5/17/2023  .... 5/17/2023 JOSE ALEJANDRO CHEEK   . AF RVR 5/17-5/19/2023 Cardizem drip  5/19 apixaba  . ESRD HD     OTHER PROBLEMS.  . CAD.  . HYTN.   . CHF.  . ESRD.    . DYSPHAGIA 5/19/2023   .. 5/21/2023 sp aminah mince moist    PMH.   CAD.  .. HISTORY ho cabg  A fib  .. 3/7/2023 Not on ac sec multiple falls  PAD  .. sp R-> L bifem - fem BK pop bypass   .. Fem pop bypass 6/21/2022   .. Partial ray amputation r great toe 6/22/2022   ESRD   .. On HD       TIME SPENT   Over 25 minutes aggregate care time spent on encounter; activities included   direct patient care, counseling and/or coordinating care reviewing notes, lab data/ imaging , discussion with multidisciplinary team/ patient  /family and explaining in detail risks, benefits, alternatives  of the recommendations     JOSE F LAO 72 f 5/17/2023 1948 DR HARRY ALBRIGHT

## 2023-05-22 NOTE — PROGRESS NOTE ADULT - SUBJECTIVE AND OBJECTIVE BOX
Date/Time Patient Seen:  		  Referring MD:   Data Reviewed	       Patient is a 72y old  Female who presents with a chief complaint of ABNORMAL XRAY OF CHEST (21 May 2023 13:20)      Subjective/HPI     PAST MEDICAL & SURGICAL HISTORY:  Diabetes mellitus II    HTN (hypertension)    h/o Anxiety attack    Depression    h/o Myocardial infarct 2007    CAD (coronary artery disease)    CAD (coronary artery disease)    h/o Hepatitis A 1969  currently resolved, no symptoms    PAD (peripheral artery disease)    Murmur, cardiac    h/o Smoking  quitted 3/2012    CRF (chronic renal failure), unspecified stage    Dialysis patient    Anemia secondary to renal failure    HTN (hypertension)    Osteomyelitis    ESRD on dialysis    Falls    Ataxia    Type 2 diabetes mellitus    Peripheral vascular disease, unspecified    CAD (coronary artery disease)    Diabetes    coronary stent 2007    s/p Ovarian cyst removal    s/p surgical removal of benign Skin lesion epigastric area    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          Medication list         MEDICATIONS  (STANDING):  apixaban 2.5 milliGRAM(s) Oral two times a day  aspirin enteric coated 81 milliGRAM(s) Oral daily  cloNIDine 0.1 milliGRAM(s) Oral two times a day  dextrose 5%. 1000 milliLiter(s) (100 mL/Hr) IV Continuous <Continuous>  dextrose 5%. 1000 milliLiter(s) (50 mL/Hr) IV Continuous <Continuous>  dextrose 50% Injectable 12.5 Gram(s) IV Push once  dextrose 50% Injectable 25 Gram(s) IV Push once  dextrose 50% Injectable 25 Gram(s) IV Push once  diltiazem    Tablet 60 milliGRAM(s) Oral three times a day  dronabinol 2.5 milliGRAM(s) Oral two times a day before meals  glucagon  Injectable 1 milliGRAM(s) IntraMuscular once  imipramine 50 milliGRAM(s) Oral daily  insulin lispro (ADMELOG) corrective regimen sliding scale   SubCutaneous three times a day before meals  metoprolol tartrate 100 milliGRAM(s) Oral two times a day  mirtazapine 7.5 milliGRAM(s) Oral at bedtime  multivitamin 1 Tablet(s) Oral daily  pantoprazole   Suspension 40 milliGRAM(s) Oral before breakfast  senna 2 Tablet(s) Oral at bedtime    MEDICATIONS  (PRN):  acetaminophen     Tablet .. 650 milliGRAM(s) Oral every 6 hours PRN Temp greater or equal to 38C (100.4F), Mild Pain (1 - 3)  aluminum hydroxide/magnesium hydroxide/simethicone Suspension 30 milliLiter(s) Oral every 4 hours PRN Dyspepsia  dextrose Oral Gel 15 Gram(s) Oral once PRN Blood Glucose LESS THAN 70 milliGRAM(s)/deciliter  ondansetron Injectable 4 milliGRAM(s) IV Push every 8 hours PRN Nausea and/or Vomiting         Vitals log        ICU Vital Signs Last 24 Hrs  T(C): 36.3 (22 May 2023 04:29), Max: 36.6 (21 May 2023 09:56)  T(F): 97.4 (22 May 2023 04:29), Max: 97.9 (21 May 2023 09:56)  HR: 67 (22 May 2023 04:29) (63 - 68)  BP: 152/81 (22 May 2023 04:29) (135/71 - 152/81)  BP(mean): --  ABP: --  ABP(mean): --  RR: 18 (22 May 2023 04:29) (18 - 18)  SpO2: 97% (22 May 2023 04:29) (91% - 97%)    O2 Parameters below as of 21 May 2023 20:03  Patient On (Oxygen Delivery Method): nasal cannula  O2 Flow (L/min): 3               Input and Output:  I&O's Detail    20 May 2023 07:01  -  21 May 2023 07:00  --------------------------------------------------------  IN:    Oral Fluid: 370 mL    sodium chloride 0.45%: 550 mL  Total IN: 920 mL    OUT:    Other (mL): 1500 mL  Total OUT: 1500 mL    Total NET: -580 mL      21 May 2023 07:01  -  22 May 2023 05:51  --------------------------------------------------------  IN:    Oral Fluid: 530 mL    sodium chloride 0.45%: 200 mL  Total IN: 730 mL    OUT:  Total OUT: 0 mL    Total NET: 730 mL          Lab Data                        9.6    11.69 )-----------( 355      ( 21 May 2023 11:13 )             31.4     05-21    134<L>  |  99  |  22  ----------------------------<  307<H>  3.6   |  29  |  3.50<H>    Ca    8.4<L>      21 May 2023 11:13              Review of Systems	      Objective     Physical Examination    heart s1s2  lung dec BS  head nc      Pertinent Lab findings & Imaging      Dexter:  NO   Adequate UO     I&O's Detail    20 May 2023 07:01  -  21 May 2023 07:00  --------------------------------------------------------  IN:    Oral Fluid: 370 mL    sodium chloride 0.45%: 550 mL  Total IN: 920 mL    OUT:    Other (mL): 1500 mL  Total OUT: 1500 mL    Total NET: -580 mL      21 May 2023 07:01  -  22 May 2023 05:51  --------------------------------------------------------  IN:    Oral Fluid: 530 mL    sodium chloride 0.45%: 200 mL  Total IN: 730 mL    OUT:  Total OUT: 0 mL    Total NET: 730 mL               Discussed with:     Cultures:	        Radiology

## 2023-05-22 NOTE — OCCUPATIONAL THERAPY INITIAL EVALUATION ADULT - RANGE OF MOTION EXAMINATION, UPPER EXTREMITY
except right hand gross grasp ~1/2 range. Pt is left hand dominant. Fine motor skills: WFL's left UE, minimally impaired RUE due to arthritic changes. Pt reports trigger finger R digit #3./bilateral UE Active ROM was WFL  (within functional limits)

## 2023-05-22 NOTE — PROGRESS NOTE ADULT - SUBJECTIVE AND OBJECTIVE BOX
CHIEF COMPLAINT/ REASON FOR VISIT  .. Patient was seen to address the  issue listed under PROBLEM LIST which is located toward bottom of this note     SHILO KOHLER    [facility] TELE 309 D1    Allergies    No Known Drug Allergies  latex (Hives)    Intolerances        PAST MEDICAL & SURGICAL HISTORY:  HTN (hypertension)      h/o Anxiety attack      Depression      h/o Myocardial infarct 2007      h/o Hepatitis A 1969  currently resolved, no symptoms      Murmur, cardiac      h/o Smoking  quitted 3/2012      Anemia secondary to renal failure      ESRD on dialysis      Falls      Ataxia      Type 2 diabetes mellitus      Peripheral vascular disease, unspecified      CAD (coronary artery disease)      Diabetes      coronary stent 2007      s/p Ovarian cyst removal      s/p surgical removal of benign Skin lesion epigastric area      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          FAMILY HISTORY:  FH: myocardial infarction (Father)    FH: myocardial infarction (Father)    Family history of type 2 diabetes mellitus in brother (Sibling)        Home Medications:  acetaminophen 325 mg oral tablet: 2 tab(s) orally every 6 hours, As needed, Temp greater or equal to 38C (100.4F), Mild Pain (1 - 3) (17 May 2023 14:45)  Admelog SoloStar 100 units/mL injectable solution: injectable 3 times a day (before meals)sliding scale  (17 May 2023 14:45)  aspirin 81 mg oral delayed release tablet: 1 tab(s) orally once a day (at bedtime) (17 May 2023 14:45)  atorvastatin 40 mg oral tablet: 1 tab(s) orally once a day (at bedtime) (17 May 2023 14:45)  Basaglar KwikPen 100 units/mL subcutaneous solution: 18 unit(s) subcutaneous once a day (at bedtime) (17 May 2023 14:42)  cloNIDine 0.1 mg oral tablet: 1 tab(s) orally 2 times a day (17 May 2023 14:45)  gabapentin 100 mg oral capsule: 1 cap(s) orally once a day (at bedtime) (17 May 2023 14:41)  imipramine 50 mg oral tablet: 1 tab(s) orally once a day (17 May 2023 14:45)  Lokelma 10 g oral powder for reconstitution: 10 gram(s) orally 2 times a week on Wed and Sat  (17 May 2023 14:45)  metoprolol tartrate 100 mg oral tablet: 1 tab(s) orally once a day (at bedtime) (17 May 2023 14:45)  Mucinex 600 mg oral tablet, extended release: 1 tab(s) orally 2 times a day (17 May 2023 14:43)  Nephro-Alfie oral tablet: 1 tab(s) orally once a day (17 May 2023 14:45)  PANTOPRAZOLE 40MG DR TAB: 1 tab(s) orally once a day (17 May 2023 14:45)      MEDICATIONS  (STANDING):  apixaban 2.5 milliGRAM(s) Oral two times a day  aspirin enteric coated 81 milliGRAM(s) Oral daily  cloNIDine 0.1 milliGRAM(s) Oral two times a day  dextrose 5%. 1000 milliLiter(s) (50 mL/Hr) IV Continuous <Continuous>  dextrose 5%. 1000 milliLiter(s) (100 mL/Hr) IV Continuous <Continuous>  dextrose 50% Injectable 12.5 Gram(s) IV Push once  dextrose 50% Injectable 25 Gram(s) IV Push once  dextrose 50% Injectable 25 Gram(s) IV Push once  diltiazem    Tablet 60 milliGRAM(s) Oral three times a day  dronabinol 2.5 milliGRAM(s) Oral two times a day before meals  glucagon  Injectable 1 milliGRAM(s) IntraMuscular once  imipramine 50 milliGRAM(s) Oral daily  insulin lispro (ADMELOG) corrective regimen sliding scale   SubCutaneous three times a day before meals  metoprolol tartrate 100 milliGRAM(s) Oral two times a day  mirtazapine 7.5 milliGRAM(s) Oral at bedtime  multivitamin 1 Tablet(s) Oral daily  pantoprazole   Suspension 40 milliGRAM(s) Oral before breakfast  senna 2 Tablet(s) Oral at bedtime    MEDICATIONS  (PRN):  acetaminophen     Tablet .. 650 milliGRAM(s) Oral every 6 hours PRN Temp greater or equal to 38C (100.4F), Mild Pain (1 - 3)  aluminum hydroxide/magnesium hydroxide/simethicone Suspension 30 milliLiter(s) Oral every 4 hours PRN Dyspepsia  dextrose Oral Gel 15 Gram(s) Oral once PRN Blood Glucose LESS THAN 70 milliGRAM(s)/deciliter  ondansetron Injectable 4 milliGRAM(s) IV Push every 8 hours PRN Nausea and/or Vomiting              Vital Signs Last 24 Hrs  T(C): 36.3 (22 May 2023 04:29), Max: 36.6 (21 May 2023 09:56)  T(F): 97.4 (22 May 2023 04:29), Max: 97.9 (21 May 2023 09:56)  HR: 67 (22 May 2023 04:29) (63 - 68)  BP: 152/81 (22 May 2023 04:29) (135/71 - 152/81)  BP(mean): --  RR: 18 (22 May 2023 04:29) (18 - 18)  SpO2: 97% (22 May 2023 04:29) (91% - 97%)    Parameters below as of 21 May 2023 20:03  Patient On (Oxygen Delivery Method): nasal cannula  O2 Flow (L/min): 3        05-21-23 @ 07:01  -  05-22-23 @ 07:00  --------------------------------------------------------  IN: 730 mL / OUT: 0 mL / NET: 730 mL              LABS:                        9.6    11.69 )-----------( 355      ( 21 May 2023 11:13 )             31.4     05-21    134<L>  |  99  |  22  ----------------------------<  307<H>  3.6   |  29  |  3.50<H>    Ca    8.4<L>      21 May 2023 11:13                WBC:  WBC Count: 11.69 K/uL (05-21 @ 11:13)  WBC Count: 13.77 K/uL (05-20 @ 07:52)  WBC Count: 12.09 K/uL (05-19 @ 12:12)      MICROBIOLOGY:  RECENT CULTURES:  05-18 Pleural Fl Pleural Fluid XXXX   polymorphonuclear leukocytes seen  No organisms seen  by cytocentrifuge   Culture is being performed.    05-17 .Blood Blood-Peripheral XXXX XXXX   No growth to date.    05-17 .Blood Blood-Peripheral XXXX XXXX   No growth to date.                    Sodium:  Sodium, Serum: 134 mmol/L (05-21 @ 11:13)  Sodium, Serum: 136 mmol/L (05-20 @ 08:59)  Sodium, Serum: 133 mmol/L (05-19 @ 12:12)      3.50 mg/dL 05-21 @ 11:13  5.30 mg/dL 05-20 @ 08:59  3.90 mg/dL 05-19 @ 12:12      Hemoglobin:  Hemoglobin: 9.6 g/dL (05-21 @ 11:13)  Hemoglobin: 10.3 g/dL (05-20 @ 07:52)  Hemoglobin: 9.7 g/dL (05-19 @ 12:12)      Platelets: Platelet Count - Automated: 355 K/uL (05-21 @ 11:13)  Platelet Count - Automated: 317 K/uL (05-20 @ 07:52)  Platelet Count - Automated: 299 K/uL (05-19 @ 12:12)              RADIOLOGY & ADDITIONAL STUDIES:      MICROBIOLOGY:  RECENT CULTURES:  05-18 Pleural Fl Pleural Fluid XXXX   polymorphonuclear leukocytes seen  No organisms seen  by cytocentrifuge   Culture is being performed.    05-17 .Blood Blood-Peripheral XXXX XXXX   No growth to date.    05-17 .Blood Blood-Peripheral XXXX XXXX   No growth to date.

## 2023-05-22 NOTE — OCCUPATIONAL THERAPY INITIAL EVALUATION ADULT - GENERAL OBSERVATIONS, REHAB EVAL
Patient found supine in bed with +IV lock, +telemonitor and supplemental 02. Patient appeared mildly confused though cooperative during evaluation.

## 2023-05-22 NOTE — PHYSICAL THERAPY INITIAL EVALUATION ADULT - PERTINENT HX OF CURRENT PROBLEM, REHAB EVAL
73yo female bib ems with cough for 2 days. pt c/o dry cough, no fever, chills no sob, pt states she got dialysis yesterday, +smoking hx no other complaintsRVP positive for enterovirus /Chest xray at [facility] showed L lung opacification .In ER found to have rapid afib and seen by cardiologist Admitted  to telemetry unit for monitoring , send 3 sets of cardiac enzymes to rule out acute coronary event, obtain ECHO to evaluate LVEF, cardiology consult  ,continue current management, O2 supply, anticoagulation plan as per cardiology consult  chest CT that showed bilateral effusion more in left with compressive atelectasis vs consolidation. Admit to monitored unit for cardiac monitoring, obtain echo to evaluate LVEF, intravenous diuresis as per card consult , monitor ins/outs, monitor renal profile and electrolytes closely ,send 3 sets of enzymes, O2 supply, serial chest xrays, monitor weights and oral intake of fluids, nutritionist consult .Seen b y pulmonologist and ID consult Procalcitonin 0.9 WBC on admission 12k Tmax 99.1

## 2023-05-22 NOTE — PROGRESS NOTE ADULT - ASSESSMENT
71yo female bib ems with cough for 2 days. pt c/o dry cough, no fever, chills no sob, pt states she got dialysis yesterday, +smoking hx no other complaintsRVP positive for enterovirus /Chest xray at [facility] showed L lung opacification .In ER found to have rapid afib and seen by cardiologist Admitted  to telemetry unit for monitoring , send 3 sets of cardiac enzymes to rule out acute coronary event, obtain ECHO to evaluate LVEF, cardiology consult  ,continue current management, O2 supply, anticoagulation plan as per cardiology consult  chest CT that showed bilateral effusion more in left with compressive atelectasis vs consolidation. Admit to monitored unit for cardiac monitoring, obtain echo to evaluate LVEF, intravenous diuresis as per card consult , monitor ins/outs, monitor renal profile and electrolytes closely ,send 3 sets of enzymes, O2 supply, serial chest xrays, monitor weights and oral intake of fluids, nutritionist consult .Seen b y pulmonologist and ID consult Procalcitonin 0.9 WBC on admission 12k Tmax 99.1 This could be just viral infection causing cough but since Chest CT has questionable consolidations, will cover for CAP. Also procalcitonin is slightly high due to ESRD. Palliative care consult requested ,to discuss advance directives and complete MOLST

## 2023-05-22 NOTE — PROGRESS NOTE ADULT - SUBJECTIVE AND OBJECTIVE BOX
PROGRESS NOTE  Patient is a 72y old  Female who presents with a chief complaint of ABNORMAL XRAY OF CHEST (22 May 2023 12:20)    Chart and available morning labs /imaging are reviewed electronically , urgent issues addressed . More information  is being added upon completion of rounds , when more information is collected and management discussed with consultants , medical staff and social service/case management on the floor   OVERNIGHT  Patient is resting in a bed comfortably .Confused ,poor mentation .No distress noted     HPI:  73yo female bib ems with cough for 2 days. pt c/o dry cough, no fever, chills no sob, pt states she got dialysis yesterday, +smoking hx no other complaintsRVP positive for enterovirus /Chest [facility] at [facility] showed L lung opacification .In ER found to have rapid afib and seen by cardiologist Admitted  to telemetry unit for monitoring , send 3 sets of cardiac enzymes to rule out acute coronary event, obtain ECHO to evaluate LVEF, cardiology consult  ,continue current management, O2 supply, anticoagulation plan as per cardiology consult  chest CT that showed bilateral effusion more in left with compressive atelectasis vs consolidation. Admit to monitored unit for cardiac monitoring, obtain echo to evaluate LVEF, intravenous diuresis as per card consult , monitor ins/outs, monitor renal profile and electrolytes closely ,send 3 sets of enzymes, O2 supply, serial chest xrays, monitor weights and oral intake of fluids, nutritionist consult .Seen b y pulmonologist and ID consult Procalcitonin 0.9 WBC on admission 12k Tmax 99.1 This could be just viral infection causing cough but since Chest CT has questionable consolidations, will cover for CAP. Also procalcitonin is slightly high due to ESRD. Palliative care consult requested ,to discuss advance directives and complete MOLST  (17 May 2023 14:01)    PAST MEDICAL & SURGICAL HISTORY:  HTN (hypertension)      h/o Anxiety attack      Depression      h/o Myocardial infarct 2007      h/o Hepatitis A 1969  currently resolved, no symptoms      Murmur, cardiac      h/o Smoking  quitted 3/2012      Anemia secondary to renal failure      ESRD on dialysis      Falls      Ataxia      Type 2 diabetes mellitus      Peripheral vascular disease, unspecified      CAD (coronary artery disease)      Diabetes      coronary stent 2007      s/p Ovarian cyst removal      s/p surgical removal of benign Skin lesion epigastric area      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          MEDICATIONS  (STANDING):  apixaban 2.5 milliGRAM(s) Oral two times a day  aspirin enteric coated 81 milliGRAM(s) Oral daily  cloNIDine 0.1 milliGRAM(s) Oral two times a day  dextrose 5%. 1000 milliLiter(s) (50 mL/Hr) IV Continuous <Continuous>  dextrose 5%. 1000 milliLiter(s) (100 mL/Hr) IV Continuous <Continuous>  dextrose 50% Injectable 12.5 Gram(s) IV Push once  dextrose 50% Injectable 25 Gram(s) IV Push once  dextrose 50% Injectable 25 Gram(s) IV Push once  diltiazem    Tablet 60 milliGRAM(s) Oral three times a day  dronabinol 2.5 milliGRAM(s) Oral two times a day before meals  glucagon  Injectable 1 milliGRAM(s) IntraMuscular once  imipramine 50 milliGRAM(s) Oral daily  insulin lispro (ADMELOG) corrective regimen sliding scale   SubCutaneous three times a day before meals  metoprolol tartrate 100 milliGRAM(s) Oral two times a day  mirtazapine 7.5 milliGRAM(s) Oral at bedtime  multivitamin 1 Tablet(s) Oral daily  pantoprazole   Suspension 40 milliGRAM(s) Oral before breakfast  senna 2 Tablet(s) Oral at bedtime    MEDICATIONS  (PRN):  acetaminophen     Tablet .. 650 milliGRAM(s) Oral every 6 hours PRN Temp greater or equal to 38C (100.4F), Mild Pain (1 - 3)  aluminum hydroxide/magnesium hydroxide/simethicone Suspension 30 milliLiter(s) Oral every 4 hours PRN Dyspepsia  dextrose Oral Gel 15 Gram(s) Oral once PRN Blood Glucose LESS THAN 70 milliGRAM(s)/deciliter  ondansetron Injectable 4 milliGRAM(s) IV Push every 8 hours PRN Nausea and/or Vomiting      OBJECTIVE    T(C): 36.7 (05-22-23 @ 10:36), Max: 36.7 (05-22-23 @ 10:36)  HR: 83 (05-22-23 @ 10:50) (63 - 83)  BP: 154/74 (05-22-23 @ 10:50) (135/71 - 154/74)  RR: 19 (05-22-23 @ 10:36) (18 - 19)  SpO2: 94% (05-22-23 @ 10:50) (89% - 97%)  Wt(kg): --  I&O's Summary    21 May 2023 07:01  -  22 May 2023 07:00  --------------------------------------------------------  IN: 730 mL / OUT: 0 mL / NET: 730 mL          REVIEW OF SYSTEMS:  CONSTITUTIONAL: No fever, weight loss, or fatigue  EYES: No eye pain, visual disturbances, or discharge  ENMT:   No sinus or throat pain  NECK: No pain or stiffness  RESPIRATORY: No cough, wheezing, chills or hemoptysis; No shortness of breath  CARDIOVASCULAR: No chest pain, palpitations, dizziness, or leg swelling  GASTROINTESTINAL: No abdominal pain. No nausea, vomiting; No diarrhea or constipation. No melena or hematochezia.  GENITOURINARY: No dysuria, frequency, hematuria, or incontinence  NEUROLOGICAL: No headaches, memory loss, loss of strength, numbness, or tremors  SKIN: No itching, burning, rashes, or lesions   MUSCULOSKELETAL: No joint pain or swelling; No muscle, back, or extremity pain    PHYSICAL EXAM:  Appearance: NAD. VS past 24 hrs -as above   HEENT:   Moist oral mucosa. Conjunctiva clear b/l.   Neck : supple  Respiratory: Lungs CTAB.  Gastrointestinal:  Soft, nontender. No rebound. No rigidity. BS present	  Cardiovascular: RRR ,S1S2 present  Neurologic: Non-focal. Moving all extremities.  Extremities: No edema. No erythema. No calf tenderness.  Skin: No rashes, No ecchymoses, No cyanosis.	  wounds ,skin lesions-See skin assesment flow sheet   LABS:                        9.6    11.69 )-----------( 355      ( 21 May 2023 11:13 )             31.4     05-21    134<L>  |  99  |  22  ----------------------------<  307<H>  3.6   |  29  |  3.50<H>    Ca    8.4<L>      21 May 2023 11:13      CAPILLARY BLOOD GLUCOSE      POCT Blood Glucose.: 187 mg/dL (22 May 2023 16:47)  POCT Blood Glucose.: 279 mg/dL (22 May 2023 12:27)  POCT Blood Glucose.: 222 mg/dL (22 May 2023 07:27)  POCT Blood Glucose.: 223 mg/dL (22 May 2023 01:48)          Culture - Acid Fast - Body Fluid w/Smear (collected 18 May 2023 13:10)  Source: Pleural Fl Pleural Fluid  Preliminary Report (20 May 2023 15:08):    Culture is being performed.    Culture - Body Fluid with Gram Stain (collected 18 May 2023 13:10)  Source: Pleural Fl Pleural Fluid  Gram Stain (18 May 2023 22:17):    polymorphonuclear leukocytes seen    No organisms seen    by cytocentrifuge  Preliminary Report (19 May 2023 16:08):    No growth    Culture - Fungal, Body Fluid (collected 18 May 2023 13:10)  Source: Pleural Fl Pleural Fluid  Preliminary Report (20 May 2023 15:03):    Culture is being performed. Fungal cultures are held for 4 weeks.    Culture - Blood (collected 17 May 2023 09:42)  Source: .Blood Blood-Peripheral  Final Report (22 May 2023 18:00):    No Growth Final    Culture - Blood (collected 17 May 2023 09:40)  Source: .Blood Blood-Peripheral  Final Report (22 May 2023 18:00):    No Growth Final      RADIOLOGY & ADDITIONAL TESTS:   reviewed elctronically  ASSESSMENT/PLAN: 	    25 minutes aggregate time was spent on this visit, 50% visit time spent in care co-ordination with other attendings and counselling patient .I have discussed care plan with patient / HCP/family member ,who expressed understanding of problems treatment and their effect and side effects, alternatives in details. I have asked if they have any questions and concerns and appropriately addressed them to best of my ability.

## 2023-05-22 NOTE — PHYSICAL THERAPY INITIAL EVALUATION ADULT - GENERAL OBSERVATIONS, REHAB EVAL
Patient received supine in bed. Chart reviewed, events noted. Patient agreeable to work with PT. Patient tolerated treatment without complaints.

## 2023-05-22 NOTE — PROGRESS NOTE ADULT - SUBJECTIVE AND OBJECTIVE BOX
Neurology follow up note    SHILO [id number]      Interval History:    Patient feels ok no new complaints.    Allergies    No Known Drug Allergies  latex (Hives)    Intolerances        MEDICATIONS    acetaminophen     Tablet .. 650 milliGRAM(s) Oral every 6 hours PRN  aluminum hydroxide/magnesium hydroxide/simethicone Suspension 30 milliLiter(s) Oral every 4 hours PRN  apixaban 2.5 milliGRAM(s) Oral two times a day  aspirin enteric coated 81 milliGRAM(s) Oral daily  cloNIDine 0.1 milliGRAM(s) Oral two times a day  dextrose 5%. 1000 milliLiter(s) IV Continuous <Continuous>  dextrose 5%. 1000 milliLiter(s) IV Continuous <Continuous>  dextrose 50% Injectable 12.5 Gram(s) IV Push once  dextrose 50% Injectable 25 Gram(s) IV Push once  dextrose 50% Injectable 25 Gram(s) IV Push once  dextrose Oral Gel 15 Gram(s) Oral once PRN  diltiazem    Tablet 60 milliGRAM(s) Oral three times a day  dronabinol 2.5 milliGRAM(s) Oral two times a day before meals  glucagon  Injectable 1 milliGRAM(s) IntraMuscular once  imipramine 50 milliGRAM(s) Oral daily  insulin lispro (ADMELOG) corrective regimen sliding scale   SubCutaneous three times a day before meals  metoprolol tartrate 100 milliGRAM(s) Oral two times a day  mirtazapine 7.5 milliGRAM(s) Oral at bedtime  multivitamin 1 Tablet(s) Oral daily  ondansetron Injectable 4 milliGRAM(s) IV Push every 8 hours PRN  pantoprazole   Suspension 40 milliGRAM(s) Oral before breakfast  senna 2 Tablet(s) Oral at bedtime              Vital Signs Last 24 Hrs  T(C): 36.3 (22 May 2023 04:29), Max: 36.6 (21 May 2023 09:56)  T(F): 97.4 (22 May 2023 04:29), Max: 97.9 (21 May 2023 09:56)  HR: 67 (22 May 2023 04:29) (63 - 68)  BP: 152/81 (22 May 2023 04:29) (135/71 - 152/81)  BP(mean): --  RR: 18 (22 May 2023 04:29) (18 - 18)  SpO2: 97% (22 May 2023 04:29) (91% - 97%)    Parameters below as of 21 May 2023 20:03  Patient On (Oxygen Delivery Method): nasal cannula  O2 Flow (L/min): 3      REVIEW OF SYSTEMS:  Constitutional:  The patient denies fever, chills or night sweats.  Head:  No headache.  Eyes:  No double vision or blurry vision.  Ears:  No ringing in the ears.  Neck:  No neck pain.  Cardiovascular:  No chest pain.  Respiratory:  Slight shortness of breath.  Abdomen:  No nausea, vomiting or abdominal pain.  Extremities/Neurological:  No numbness and tingling.  Musculoskeletal:  Occasional joint pain.    PHYSICAL EXAMINATION:   HEENT:  Head:  Normocephalic, atraumatic.  Eyes:  No scleral icterus.  Ears:  Hearing decreased bilaterally.  ABDOMEN:  Soft, nontender.  EXTREMITIES:  No clubbing or cyanosis were noted.  NEUROLOGIC:  The patient is arousable to verbal stimuli.  Location was [facility], year was 2023, five nickels in a quarter, dog backwards spells god.  Upon conversing with the patient, she did seem forgetful, was able to name number of fingers in each eye.  Speech was fluent.  Smile symmetric, was able to follow complex commands, took right hand and touch left ear.  Motor:  Bilateral upper 4/5, bilateral lower 3-/5.  Sensory:  Bilateral upper and lower intact to light touch.         LABS:  CBC Full  -  ( 21 May 2023 11:13 )  WBC Count : 11.69 K/uL  RBC Count : 3.16 M/uL  Hemoglobin : 9.6 g/dL  Hematocrit : 31.4 %  Platelet Count - Automated : 355 K/uL  Mean Cell Volume : 99.4 fl  Mean Cell Hemoglobin : 30.4 pg  Mean Cell Hemoglobin Concentration : 30.6 gm/dL  Auto Neutrophil # : x  Auto Lymphocyte # : x  Auto Monocyte # : x  Auto Eosinophil # : x  Auto Basophil # : x  Auto Neutrophil % : x  Auto Lymphocyte % : x  Auto Monocyte % : x  Auto Eosinophil % : x  Auto Basophil % : x      05-21    134<L>  |  99  |  22  ----------------------------<  307<H>  3.6   |  29  |  3.50<H>    Ca    8.4<L>      21 May 2023 11:13      Hemoglobin A1C:       Vitamin B12         RADIOLOGY  ANALYSIS AND PLAN:  This is a 72-year-old with episode of altered mental status in regards to lethargy.  1.For episode of altered mental status in regards to lethargy, I suspect most likely metabolic encephalopathy secondary to underlying respiratory issues.  I would recommend to check carbon dioxide levels.  2.For history of hypertension, monitor systolic blood pressure.  3.For history of hyperlipidemia, continue the patient on her statin.  4.For history of diabetes, strict control of blood sugars.  5.For mild cognitive impairment, would check TSH, vitamin 12 and folate.  6.For history of neuropathy, continue the patient on her gabapentin.    Greater than 45 minutes of time was spent with the patient, plan of care, reviewing data, with greater than 50% of the visit was spent counseling and/or coordinating care with multidisciplinary healthcare team   Neurology follow up note    SHILO [id number]      Interval History:    Patient feels ok no new complaints.    Allergies    No Known Drug Allergies  latex (Hives)    Intolerances        MEDICATIONS    acetaminophen     Tablet .. 650 milliGRAM(s) Oral every 6 hours PRN  aluminum hydroxide/magnesium hydroxide/simethicone Suspension 30 milliLiter(s) Oral every 4 hours PRN  apixaban 2.5 milliGRAM(s) Oral two times a day  aspirin enteric coated 81 milliGRAM(s) Oral daily  cloNIDine 0.1 milliGRAM(s) Oral two times a day  dextrose 5%. 1000 milliLiter(s) IV Continuous <Continuous>  dextrose 5%. 1000 milliLiter(s) IV Continuous <Continuous>  dextrose 50% Injectable 12.5 Gram(s) IV Push once  dextrose 50% Injectable 25 Gram(s) IV Push once  dextrose 50% Injectable 25 Gram(s) IV Push once  dextrose Oral Gel 15 Gram(s) Oral once PRN  diltiazem    Tablet 60 milliGRAM(s) Oral three times a day  dronabinol 2.5 milliGRAM(s) Oral two times a day before meals  glucagon  Injectable 1 milliGRAM(s) IntraMuscular once  imipramine 50 milliGRAM(s) Oral daily  insulin lispro (ADMELOG) corrective regimen sliding scale   SubCutaneous three times a day before meals  metoprolol tartrate 100 milliGRAM(s) Oral two times a day  mirtazapine 7.5 milliGRAM(s) Oral at bedtime  multivitamin 1 Tablet(s) Oral daily  ondansetron Injectable 4 milliGRAM(s) IV Push every 8 hours PRN  pantoprazole   Suspension 40 milliGRAM(s) Oral before breakfast  senna 2 Tablet(s) Oral at bedtime              Vital Signs Last 24 Hrs  T(C): 36.3 (22 May 2023 04:29), Max: 36.6 (21 May 2023 09:56)  T(F): 97.4 (22 May 2023 04:29), Max: 97.9 (21 May 2023 09:56)  HR: 67 (22 May 2023 04:29) (63 - 68)  BP: 152/81 (22 May 2023 04:29) (135/71 - 152/81)  BP(mean): --  RR: 18 (22 May 2023 04:29) (18 - 18)  SpO2: 97% (22 May 2023 04:29) (91% - 97%)    Parameters below as of 21 May 2023 20:03  Patient On (Oxygen Delivery Method): nasal cannula  O2 Flow (L/min): 3      REVIEW OF SYSTEMS:  Constitutional:  The patient denies fever, chills or night sweats.  Head:  No headache.  Eyes:  No double vision or blurry vision.  Ears:  No ringing in the ears.  Neck:  No neck pain.  Cardiovascular:  No chest pain.  Respiratory:  Slight shortness of breath.  Abdomen:  No nausea, vomiting or abdominal pain.  Extremities/Neurological:  No numbness and tingling.  Musculoskeletal:  Occasional joint pain.    PHYSICAL EXAMINATION:   HEENT:  Head:  Normocephalic, atraumatic.  Eyes:  No scleral icterus.  Ears:  Hearing decreased bilaterally.  ABDOMEN:  Soft, nontender.  EXTREMITIES:  No clubbing or cyanosis were noted.  NEUROLOGIC:  The patient is arousable to verbal stimuli.  Location was [facility], year was 2023, Upon conversing with the patient, she did seem forgetful, was able to name number of fingers in each eye.  Speech was fluent.  Smile symmetric, was able to follow complex commands, took right hand and touch left ear.  Motor:  Bilateral upper 4/5, bilateral lower 3-/5.  Sensory:  Bilateral upper and lower intact to light touch.  able to follow complex commands          LABS:  CBC Full  -  ( 21 May 2023 11:13 )  WBC Count : 11.69 K/uL  RBC Count : 3.16 M/uL  Hemoglobin : 9.6 g/dL  Hematocrit : 31.4 %  Platelet Count - Automated : 355 K/uL  Mean Cell Volume : 99.4 fl  Mean Cell Hemoglobin : 30.4 pg  Mean Cell Hemoglobin Concentration : 30.6 gm/dL  Auto Neutrophil # : x  Auto Lymphocyte # : x  Auto Monocyte # : x  Auto Eosinophil # : x  Auto Basophil # : x  Auto Neutrophil % : x  Auto Lymphocyte % : x  Auto Monocyte % : x  Auto Eosinophil % : x  Auto Basophil % : x      05-21    134<L>  |  99  |  22  ----------------------------<  307<H>  3.6   |  29  |  3.50<H>    Ca    8.4<L>      21 May 2023 11:13      Hemoglobin A1C:       Vitamin B12         RADIOLOGY  ANALYSIS AND PLAN:  This is a 72-year-old with episode of altered mental status in regards to lethargy.  1.For episode of altered mental status in regards to lethargy, I suspect most likely metabolic encephalopathy secondary to underlying respiratory issues.  I would recommend to check carbon dioxide levels as needed  2.For history of hypertension, monitor systolic blood pressure.  3.For history of hyperlipidemia, continue the patient on her statin.  4.For history of diabetes, strict control of blood sugars.  5.For mild cognitive impairment supportive treatment   6.For history of neuropathy, continue the patient on her gabapentin.    Greater than 45 minutes of time was spent with the patient, plan of care, reviewing data, with greater than 50% of the visit was spent counseling and/or coordinating care with multidisciplinary healthcare team

## 2023-05-22 NOTE — PROGRESS NOTE ADULT - SUBJECTIVE AND OBJECTIVE BOX
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 pt c/o dry cough, no fever, chills no sob, pt states she got dialysis yesterday, +smoking hx no other complaintsRVP positive for enterovirus /[facility] showed L lung opacification .In ER found to have rapid afib and seen by cardiologist Admitted  to telemetry unit for monitoring , send 3 sets of cardiac enzymes to rule out acute coronary event, obtain ECHO to evaluate LVEF, cardiology consult  ,continue current management, O2 supply, anticoagulation plan as per cardiology consult  chest CT that showed bilateral effusion more in left with compressive atelectasis vs consolidation. Admit to monitored unit for cardiac monitoring, obtain echo to evaluate LVEF, intravenous diuresis as per card consult , monitor ins/outs, monitor renal profile and electrolytes closely ,send 3 sets of enzymes, O2 supply, serial chest xrays, monitor weights and oral intake of fluids, nutritionist consult .Seen b y pulmonologist and ID consult Procalcitonin 0.9 WBC on admission 12k Tmax 99.1 This could be just viral infection causing cough but since Chest CT has questionable consolidations, will cover for CAP. Also procalcitonin is slightly high due to ESRD. Palliative care consult requested ,to discuss advance directives and complete MOLST  (17 May 2023 14:01)      REVIEW OF SYSTEMS:    CONSTITUTIONAL: No fever, weight loss, or fatigue  EYES: No eye pain, visual disturbances, or discharge  ENMT:  No difficulty hearing, tinnitus, vertigo; No sinus or throat pain  NECK: No pain or stiffness  BREASTS: No pain, masses, or nipple discharge  RESPIRATORY: No cough, wheezing, chills or hemoptysis; No shortness of breath  CARDIOVASCULAR: No chest pain, palpitations, dizziness, or leg swelling  GASTROINTESTINAL: No abdominal or epigastric pain. No nausea, vomiting, or hematemesis; No diarrhea or constipation. No melena or hematochezia.  GENITOURINARY: No dysuria, frequency, hematuria, or incontinence  NEUROLOGICAL: No headaches, memory loss, loss of strength, numbness, or tremors  SKIN: No itching, burning, rashes, or lesions   LYMPH NODES: No enlarged glands  ENDOCRINE: No heat or cold intolerance; No hair loss  MUSCULOSKELETAL: No joint pain or swelling; No muscle, back, or extremity pain  PSYCHIATRIC: No depression, anxiety, mood swings, or difficulty sleeping  HEME/LYMPH: No easy bruising, or bleeding gums  ALLERGY AND IMMUNOLOGIC: No hives or eczema    MEDICATIONS  (STANDING):  apixaban 2.5 milliGRAM(s) Oral two times a day  aspirin enteric coated 81 milliGRAM(s) Oral daily  cloNIDine 0.1 milliGRAM(s) Oral two times a day  dextrose 5%. 1000 milliLiter(s) (100 mL/Hr) IV Continuous <Continuous>  dextrose 5%. 1000 milliLiter(s) (50 mL/Hr) IV Continuous <Continuous>  dextrose 50% Injectable 12.5 Gram(s) IV Push once  dextrose 50% Injectable 25 Gram(s) IV Push once  dextrose 50% Injectable 25 Gram(s) IV Push once  diltiazem    Tablet 60 milliGRAM(s) Oral three times a day  dronabinol 2.5 milliGRAM(s) Oral two times a day before meals  glucagon  Injectable 1 milliGRAM(s) IntraMuscular once  imipramine 50 milliGRAM(s) Oral daily  insulin lispro (ADMELOG) corrective regimen sliding scale   SubCutaneous three times a day before meals  metoprolol tartrate 100 milliGRAM(s) Oral two times a day  mirtazapine 7.5 milliGRAM(s) Oral at bedtime  multivitamin 1 Tablet(s) Oral daily  pantoprazole   Suspension 40 milliGRAM(s) Oral before breakfast  senna 2 Tablet(s) Oral at bedtime    MEDICATIONS  (PRN):  acetaminophen     Tablet .. 650 milliGRAM(s) Oral every 6 hours PRN Temp greater or equal to 38C (100.4F), Mild Pain (1 - 3)  aluminum hydroxide/magnesium hydroxide/simethicone Suspension 30 milliLiter(s) Oral every 4 hours PRN Dyspepsia  dextrose Oral Gel 15 Gram(s) Oral once PRN Blood Glucose LESS THAN 70 milliGRAM(s)/deciliter  ondansetron Injectable 4 milliGRAM(s) IV Push every 8 hours PRN Nausea and/or Vomiting      ALLERGIES: No Known Drug Allergies  latex (Hives)      FAMILY HISTORY:  FH: myocardial infarction (Father)    FH: myocardial infarction (Father)    Family history of type 2 diabetes mellitus in brother (Sibling)        PHYSICAL EXAMINATION:  -----------------------------  T(C): 36.3 (05-22-23 @ 04:29), Max: 36.6 (05-21-23 @ 09:56)  HR: 67 (05-22-23 @ 04:29) (63 - 68)  BP: 152/81 (05-22-23 @ 04:29) (135/71 - 152/81)  RR: 18 (05-22-23 @ 04:29) (18 - 18)  SpO2: 97% (05-22-23 @ 04:29) (91% - 97%)  Wt(kg): --    05-21 @ 07:01  -  05-22 @ 07:00  --------------------------------------------------------  IN:    Oral Fluid: 530 mL    sodium chloride 0.45%: 200 mL  Total IN: 730 mL    OUT:  Total OUT: 0 mL    Total NET: 730 mL            VITALS  T(C): 36.3 (05-22-23 @ 04:29), Max: 36.6 (05-21-23 @ 09:56)  HR: 67 (05-22-23 @ 04:29) (63 - 68)  BP: 152/81 (05-22-23 @ 04:29) (135/71 - 152/81)  RR: 18 (05-22-23 @ 04:29) (18 - 18)  SpO2: 97% (05-22-23 @ 04:29) (91% - 97%)    Constitutional: well developed, normal appearance, well groomed, well nourished, no deformities and no acute distress.   Eyes: the conjunctiva exhibited no abnormalities and the eyelids demonstrated no xanthelasmas.   HEENT: normal oral mucosa, no oral pallor and no oral cyanosis.   Neck: normal jugular venous A waves present, normal jugular venous V waves present and no jugular venous wilson A waves.   Pulmonary: no respiratory distress, normal respiratory rhythm and effort, no accessory muscle use and lungs were clear to auscultation bilaterally.   Cardiovascular: heart rate and rhythm were normal, normal S1 and S2 and no murmur, gallop, rub, heave or thrill are present.   Abdomen: soft, non-tender, no hepato-splenomegaly and no abdominal mass palpated.   Musculoskeletal: the gait could not be assessed..   Extremities: no clubbing of the fingernails, no localized cyanosis, no petechial hemorrhages and no ischemic changes.   Skin: normal skin color and pigmentation, no rash, no venous stasis, no skin lesions, no skin ulcer and no xanthoma was observed.   Psychiatric: oriented to person, place, and time, the affect was normal, the mood was normal and not feeling anxious.     LABS:   --------  05-21    134<L>  |  99  |  22  ----------------------------<  307<H>  3.6   |  29  |  3.50<H>    Ca    8.4<L>      21 May 2023 11:13                           9.6    11.69 )-----------( 355      ( 21 May 2023 11:13 )             31.4                 RADIOLOGY:  -----------------    ECG:     ECHO:

## 2023-05-22 NOTE — PROGRESS NOTE ADULT - ASSESSMENT
71yo female bib ems with cough for 2 days. pt c/o dry cough, no fever, chills no sob, pt states she got dialysis yesterday, +smoking hx no other complaints RVP positive for enterovirus /Chest xray at [facility] showed L lung opacification    pleural eff  atelectasis  esrd  anemia  OP  OA  URI  HTN  CAD  Smoker    GI eval noted  overnight issues with IV Access    pt is DNR DNI  no feeding tube  ok for Dialysis  MOLST filled out - signed - updated

## 2023-05-22 NOTE — OCCUPATIONAL THERAPY INITIAL EVALUATION ADULT - PERTINENT HX OF CURRENT PROBLEM, REHAB EVAL
71 y/o female bib ems with cough for 2 days. pt c/o dry cough, no fever, chills no sob, pt states she got dialysis yesterday, +smoking hx no other complaints, RVP positive for enterovirus /Chest xray at [facility] showed L lung opacification. In ER found to have rapid afib and seen by cardiologist. Pt admitted 5/17/23 with atrial fib and to telemetry unit for monitoring, send 3 sets of cardiac enzymes to rule out acute coronary event, obtain ECHO to evaluate LVEF, cardiology consult  ,continue current management, O2 supply, anticoagulation plan as per cardiology consult. Chest CT that showed bilateral effusion more in left with compressive atelectasis vs consolidation. Pt s/p left thoracentesis 5/18/23.

## 2023-05-22 NOTE — PROGRESS NOTE ADULT - ASSESSMENT
73yo female bib ems with cough for 2 days. pt c/o dry cough, no fever, chills no sob, pt states she got dialysis yesterday, +smoking hx no other complaintsRVP positive for enterovirus /Chest xray at [facility] showed L lung opacification .In ER found to have rapid afib and seen by cardiologist Admitted  to telemetry unit for monitoring , send 3 sets of cardiac enzymes to rule out acute coronary event, obtain ECHO to evaluate LVEF, cardiology consult  ,continue current management, O2 supply, anticoagulation plan as per cardiology consult  chest CT that showed bilateral effusion more in left with compressive atelectasis vs consolidation. Admit to monitored unit for cardiac monitoring, obtain echo to evaluate LVEF, intravenous diuresis as per card consult , monitor ins/outs, monitor renal profile and electrolytes closely ,send 3 sets of enzymes, O2 supply, serial chest xrays, monitor weights and oral intake of fluids, nutritionist consult .Seen b y pulmonologist and ID consult Procalcitonin 0.9 WBC on admission 12k Tmax 99.1 This could be just viral infection causing cough but since Chest CT has questionable consolidations, will cover for CAP. Also procalcitonin is slightly high due to ESRD. Palliative care consult requested ,to discuss advance directives and complete MOLST  (17 May 2023 14:01)        esrd on hd   Excess fluids and waste products will be removed from your blood; your electrolytes will be balanced; your blood pressure will be controlled.      ANEMIA PLAN:  Anemia of chronic disease:  Well controlled by Aranesp  H and H subtherapeutic .  We will continue Aranesp aiming for a HCT of 32-36 %.   We will monitor Iron stores, B12 and RBC folate .      BP monitoring,continue current antihypertensive meds, low salt diet,followup with PMD in 1-2 weeks  cloNIDine 0.1 milliGRAM(s) Oral two times a day

## 2023-05-22 NOTE — PROGRESS NOTE ADULT - SUBJECTIVE AND OBJECTIVE BOX
[facility]  Frandy Strong PA-C  [address] [place] [address]  [phone]      INTERVAL HPI/OVERNIGHT EVENTS:  Pt s/e  No GI events    MEDICATIONS  (STANDING):  apixaban 2.5 milliGRAM(s) Oral two times a day  aspirin enteric coated 81 milliGRAM(s) Oral daily  cloNIDine 0.1 milliGRAM(s) Oral two times a day  dextrose 5%. 1000 milliLiter(s) (50 mL/Hr) IV Continuous <Continuous>  dextrose 5%. 1000 milliLiter(s) (100 mL/Hr) IV Continuous <Continuous>  dextrose 50% Injectable 12.5 Gram(s) IV Push once  dextrose 50% Injectable 25 Gram(s) IV Push once  dextrose 50% Injectable 25 Gram(s) IV Push once  diltiazem    Tablet 60 milliGRAM(s) Oral three times a day  dronabinol 2.5 milliGRAM(s) Oral two times a day before meals  glucagon  Injectable 1 milliGRAM(s) IntraMuscular once  imipramine 50 milliGRAM(s) Oral daily  insulin lispro (ADMELOG) corrective regimen sliding scale   SubCutaneous three times a day before meals  metoprolol tartrate 100 milliGRAM(s) Oral two times a day  mirtazapine 7.5 milliGRAM(s) Oral at bedtime  multivitamin 1 Tablet(s) Oral daily  pantoprazole   Suspension 40 milliGRAM(s) Oral before breakfast  senna 2 Tablet(s) Oral at bedtime    MEDICATIONS  (PRN):  acetaminophen     Tablet .. 650 milliGRAM(s) Oral every 6 hours PRN Temp greater or equal to 38C (100.4F), Mild Pain (1 - 3)  aluminum hydroxide/magnesium hydroxide/simethicone Suspension 30 milliLiter(s) Oral every 4 hours PRN Dyspepsia  dextrose Oral Gel 15 Gram(s) Oral once PRN Blood Glucose LESS THAN 70 milliGRAM(s)/deciliter  ondansetron Injectable 4 milliGRAM(s) IV Push every 8 hours PRN Nausea and/or Vomiting      Allergies    No Known Drug Allergies  latex (Hives)      PHYSICAL EXAM:   Vital Signs:  Vital Signs Last 24 Hrs  T(C): 36.7 (22 May 2023 10:36), Max: 36.7 (22 May 2023 10:36)  T(F): 98 (22 May 2023 10:36), Max: 98 (22 May 2023 10:36)  HR: 83 (22 May 2023 10:50) (63 - 83)  BP: 154/74 (22 May 2023 10:50) (135/71 - 154/74)  BP(mean): --  RR: 19 (22 May 2023 10:36) (18 - 19)  SpO2: 94% (22 May 2023 10:50) (89% - 97%)    Parameters below as of 22 May 2023 10:50  Patient On (Oxygen Delivery Method): nasal cannula  O2 Flow (L/min): 3    Daily     Daily Weight in k.4 (22 May 2023 04:48)    GENERAL:  Appears stated age  HEENT:  NC/AT  CHEST:  Full & symmetric excursion  HEART:  Regular rhythm  ABDOMEN:  Soft, non-tender, non-distended  EXTEREMITIES:  no cyanosis  SKIN:  No rash  NEURO:  Alert      LABS:                        9.6    11.69 )-----------( 355      ( 21 May 2023 11:13 )             31.4     05-21    134<L>  |  99  |  22  ----------------------------<  307<H>  3.6   |  29  |  3.50<H>    Ca    8.4<L>      21 May 2023 11:13

## 2023-05-22 NOTE — PROGRESS NOTE ADULT - ASSESSMENT
dysphagia    molst in chart reviewed  clearly states no feeding tube  she is DNR  repeat S/S; cleared for minced/moist  cont diet per SLP recs    I reviewed the overnight course of events on the unit, re-confirming the patient history. I discussed the care with the patient and their family  Differential diagnosis and plan of care discussed with patient after the evaluation  40 minutes spent on total encounter of which more than fifty percent of the encounter was spent counseling and/or coordinating care by the attending physician.  Advanced care planning was discussed with patient and family.  Advanced care planning forms were reviewed and discussed.  Risks, benefits and alternatives of gastroenterologic procedures were discussed in detail and all questions were answered.

## 2023-05-23 ENCOUNTER — TRANSCRIPTION ENCOUNTER (OUTPATIENT)
Age: 73
End: 2023-05-23

## 2023-05-23 LAB
ANION GAP SERPL CALC-SCNC: 3 MMOL/L — LOW (ref 5–17)
BUN SERPL-MCNC: 40 MG/DL — HIGH (ref 7–23)
CALCIUM SERPL-MCNC: 9.6 MG/DL — SIGNIFICANT CHANGE UP (ref 8.5–10.1)
CHLORIDE SERPL-SCNC: 101 MMOL/L — SIGNIFICANT CHANGE UP (ref 96–108)
CO2 SERPL-SCNC: 30 MMOL/L — SIGNIFICANT CHANGE UP (ref 22–31)
CREAT SERPL-MCNC: 6.3 MG/DL — HIGH (ref 0.5–1.3)
CULTURE RESULTS: SIGNIFICANT CHANGE UP
EGFR: 6 ML/MIN/1.73M2 — LOW
GLUCOSE SERPL-MCNC: 262 MG/DL — HIGH (ref 70–99)
HCT VFR BLD CALC: 35.2 % — SIGNIFICANT CHANGE UP (ref 34.5–45)
HGB BLD-MCNC: 10.9 G/DL — LOW (ref 11.5–15.5)
MCHC RBC-ENTMCNC: 30.2 PG — SIGNIFICANT CHANGE UP (ref 27–34)
MCHC RBC-ENTMCNC: 31 GM/DL — LOW (ref 32–36)
MCV RBC AUTO: 97.5 FL — SIGNIFICANT CHANGE UP (ref 80–100)
NRBC # BLD: 0 /100 WBCS — SIGNIFICANT CHANGE UP (ref 0–0)
PLATELET # BLD AUTO: 459 K/UL — HIGH (ref 150–400)
POTASSIUM SERPL-MCNC: 4.4 MMOL/L — SIGNIFICANT CHANGE UP (ref 3.5–5.3)
POTASSIUM SERPL-SCNC: 4.4 MMOL/L — SIGNIFICANT CHANGE UP (ref 3.5–5.3)
RBC # BLD: 3.61 M/UL — LOW (ref 3.8–5.2)
RBC # FLD: 15.6 % — HIGH (ref 10.3–14.5)
SODIUM SERPL-SCNC: 134 MMOL/L — LOW (ref 135–145)
SPECIMEN SOURCE: SIGNIFICANT CHANGE UP
WBC # BLD: 12.01 K/UL — HIGH (ref 3.8–10.5)
WBC # FLD AUTO: 12.01 K/UL — HIGH (ref 3.8–10.5)

## 2023-05-23 PROCEDURE — 99233 SBSQ HOSP IP/OBS HIGH 50: CPT

## 2023-05-23 PROCEDURE — 99232 SBSQ HOSP IP/OBS MODERATE 35: CPT

## 2023-05-23 RX ORDER — GABAPENTIN 400 MG/1
1 CAPSULE ORAL
Refills: 0 | DISCHARGE

## 2023-05-23 RX ORDER — DRONABINOL 2.5 MG
1 CAPSULE ORAL
Qty: 0 | Refills: 0 | DISCHARGE
Start: 2023-05-23

## 2023-05-23 RX ORDER — SODIUM ZIRCONIUM CYCLOSILICATE 10 G/10G
10 POWDER, FOR SUSPENSION ORAL
Qty: 0 | Refills: 0 | DISCHARGE

## 2023-05-23 RX ORDER — METOPROLOL TARTRATE 50 MG
1 TABLET ORAL
Qty: 0 | Refills: 0 | DISCHARGE
Start: 2023-05-23

## 2023-05-23 RX ORDER — DILTIAZEM HCL 120 MG
1 CAPSULE, EXT RELEASE 24 HR ORAL
Qty: 0 | Refills: 0 | DISCHARGE
Start: 2023-05-23

## 2023-05-23 RX ORDER — SENNA PLUS 8.6 MG/1
2 TABLET ORAL
Qty: 0 | Refills: 0 | DISCHARGE
Start: 2023-05-23

## 2023-05-23 RX ORDER — MIRTAZAPINE 45 MG/1
1 TABLET, ORALLY DISINTEGRATING ORAL
Qty: 0 | Refills: 0 | DISCHARGE
Start: 2023-05-23

## 2023-05-23 RX ORDER — APIXABAN 2.5 MG/1
1 TABLET, FILM COATED ORAL
Qty: 0 | Refills: 0 | DISCHARGE
Start: 2023-05-23

## 2023-05-23 RX ADMIN — Medication 2: at 11:56

## 2023-05-23 RX ADMIN — Medication 1 TABLET(S): at 11:11

## 2023-05-23 RX ADMIN — PANTOPRAZOLE SODIUM 40 MILLIGRAM(S): 20 TABLET, DELAYED RELEASE ORAL at 06:23

## 2023-05-23 RX ADMIN — Medication 100 MILLIGRAM(S): at 21:24

## 2023-05-23 RX ADMIN — Medication 650 MILLIGRAM(S): at 03:46

## 2023-05-23 RX ADMIN — Medication 2.5 MILLIGRAM(S): at 06:24

## 2023-05-23 RX ADMIN — Medication 2.5 MILLIGRAM(S): at 15:37

## 2023-05-23 RX ADMIN — Medication 50 MILLIGRAM(S): at 11:11

## 2023-05-23 RX ADMIN — Medication 0.1 MILLIGRAM(S): at 06:14

## 2023-05-23 RX ADMIN — APIXABAN 2.5 MILLIGRAM(S): 2.5 TABLET, FILM COATED ORAL at 06:14

## 2023-05-23 RX ADMIN — Medication 650 MILLIGRAM(S): at 04:00

## 2023-05-23 RX ADMIN — Medication 81 MILLIGRAM(S): at 11:11

## 2023-05-23 RX ADMIN — SENNA PLUS 2 TABLET(S): 8.6 TABLET ORAL at 21:24

## 2023-05-23 RX ADMIN — Medication 100 MILLIGRAM(S): at 04:28

## 2023-05-23 RX ADMIN — Medication 0.1 MILLIGRAM(S): at 21:24

## 2023-05-23 RX ADMIN — Medication 2: at 07:48

## 2023-05-23 RX ADMIN — Medication 60 MILLIGRAM(S): at 06:13

## 2023-05-23 RX ADMIN — Medication 60 MILLIGRAM(S): at 13:24

## 2023-05-23 RX ADMIN — Medication 100 MILLIGRAM(S): at 06:13

## 2023-05-23 RX ADMIN — MIRTAZAPINE 7.5 MILLIGRAM(S): 45 TABLET, ORALLY DISINTEGRATING ORAL at 21:24

## 2023-05-23 RX ADMIN — Medication 60 MILLIGRAM(S): at 21:23

## 2023-05-23 RX ADMIN — APIXABAN 2.5 MILLIGRAM(S): 2.5 TABLET, FILM COATED ORAL at 21:24

## 2023-05-23 NOTE — DISCHARGE NOTE PROVIDER - CARE PROVIDER_API CALL
Pahlavan, Mohsen (MD)  Medicine  [address] [facility], [address] [place] [address]  Phone: [phone]  Fax: [phone]  Follow Up Time: 1 week

## 2023-05-23 NOTE — DISCHARGE NOTE PROVIDER - NSDCCAREPROVSEEN_GEN_ALL_CORE_FT
Hans, Rita James, Joseluis Shepherd, Dev Larson, Diamond Oneill, Manuelito Landeros, Art Callahan, Negrito Shepherd, Abdul Weil, Patricia

## 2023-05-23 NOTE — DISCHARGE NOTE NURSING/CASE MANAGEMENT/SOCIAL WORK - PATIENT PORTAL LINK FT
You can access the FollowMyHealth Patient Portal offered by [facility] by registering at the following website: [email or web address]. By joining [employer]’s FollowMyHealth portal, you will also be able to view your health information using other applications (apps) compatible with our system.

## 2023-05-23 NOTE — DISCHARGE NOTE PROVIDER - NSDCMRMEDTOKEN_GEN_ALL_CORE_FT
acetaminophen 325 mg oral tablet: 2 tab(s) orally every 6 hours, As needed, Temp greater or equal to 38C (100.4F), Mild Pain (1 - 3)  Admelog SoloStar 100 units/mL injectable solution: injectable 3 times a day (before meals)sliding scale   apixaban 2.5 mg oral tablet: 1 tab(s) orally 2 times a day  aspirin 81 mg oral delayed release tablet: 1 tab(s) orally once a day (at bedtime)  Basaglar KwikPen 100 units/mL subcutaneous solution: 10 unit(s) subcutaneous once a day (at bedtime)  cloNIDine 0.1 mg oral tablet: 1 tab(s) orally 2 times a day  dilTIAZem 60 mg oral tablet: 1 tab(s) orally 3 times a day  droNABinol 2.5 mg oral capsule: 1 cap(s) orally 2 times a day (before meals)  imipramine 50 mg oral tablet: 1 tab(s) orally once a day  metoprolol tartrate 100 mg oral tablet: 1 tab(s) orally 2 times a day  mirtazapine 7.5 mg oral tablet: 1 tab(s) orally once a day (at bedtime)  Mucinex 600 mg oral tablet, extended release: 1 tab(s) orally 2 times a day  Nephro-Alfie oral tablet: 1 tab(s) orally once a day  PANTOPRAZOLE 40MG DR TAB: 1 tab(s) orally once a day  senna leaf extract oral tablet: 2 tab(s) orally once a day (at bedtime)

## 2023-05-23 NOTE — DISCHARGE NOTE NURSING/CASE MANAGEMENT/SOCIAL WORK - NSDCPEFALRISK_GEN_ALL_CORE
For information on Fall & Injury Prevention, visit: [email or web address] OR  [email or web address] OR  https://www.cdc.gov/steadi/patient.html

## 2023-05-23 NOTE — PROGRESS NOTE ADULT - SUBJECTIVE AND OBJECTIVE BOX
CHIEF COMPLAINT/ REASON FOR VISIT  .. Patient was seen to address the  issue listed under PROBLEM LIST which is located toward bottom of this note     SHILO KOHLER    PLV TELE 309 D1    Allergies    No Known Drug Allergies  latex (Hives)    Intolerances        PAST MEDICAL & SURGICAL HISTORY:  HTN (hypertension)      h/o Anxiety attack      Depression      h/o Myocardial infarct 2007      h/o Hepatitis A 1969  currently resolved, no symptoms      Murmur, cardiac      h/o Smoking  quitted 3/2012      Anemia secondary to renal failure      ESRD on dialysis      Falls      Ataxia      Type 2 diabetes mellitus      Peripheral vascular disease, unspecified      CAD (coronary artery disease)      Diabetes      coronary stent 2007      s/p Ovarian cyst removal      s/p surgical removal of benign Skin lesion epigastric area      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          FAMILY HISTORY:  FH: myocardial infarction (Father)    FH: myocardial infarction (Father)    Family history of type 2 diabetes mellitus in brother (Sibling)        Home Medications:  acetaminophen 325 mg oral tablet: 2 tab(s) orally every 6 hours, As needed, Temp greater or equal to 38C (100.4F), Mild Pain (1 - 3) (17 May 2023 14:45)  Admelog SoloStar 100 units/mL injectable solution: injectable 3 times a day (before meals)sliding scale  (17 May 2023 14:45)  aspirin 81 mg oral delayed release tablet: 1 tab(s) orally once a day (at bedtime) (17 May 2023 14:45)  atorvastatin 40 mg oral tablet: 1 tab(s) orally once a day (at bedtime) (17 May 2023 14:45)  Basaglar KwikPen 100 units/mL subcutaneous solution: 18 unit(s) subcutaneous once a day (at bedtime) (17 May 2023 14:42)  cloNIDine 0.1 mg oral tablet: 1 tab(s) orally 2 times a day (17 May 2023 14:45)  gabapentin 100 mg oral capsule: 1 cap(s) orally once a day (at bedtime) (17 May 2023 14:41)  imipramine 50 mg oral tablet: 1 tab(s) orally once a day (17 May 2023 14:45)  Lokelma 10 g oral powder for reconstitution: 10 gram(s) orally 2 times a week on Wed and Sat  (17 May 2023 14:45)  metoprolol tartrate 100 mg oral tablet: 1 tab(s) orally once a day (at bedtime) (17 May 2023 14:45)  Mucinex 600 mg oral tablet, extended release: 1 tab(s) orally 2 times a day (17 May 2023 14:43)  Nephro-Alfie oral tablet: 1 tab(s) orally once a day (17 May 2023 14:45)  PANTOPRAZOLE 40MG DR TAB: 1 tab(s) orally once a day (17 May 2023 14:45)      MEDICATIONS  (STANDING):  apixaban 2.5 milliGRAM(s) Oral two times a day  aspirin enteric coated 81 milliGRAM(s) Oral daily  cloNIDine 0.1 milliGRAM(s) Oral two times a day  dextrose 5%. 1000 milliLiter(s) (100 mL/Hr) IV Continuous <Continuous>  dextrose 5%. 1000 milliLiter(s) (50 mL/Hr) IV Continuous <Continuous>  dextrose 50% Injectable 25 Gram(s) IV Push once  dextrose 50% Injectable 12.5 Gram(s) IV Push once  dextrose 50% Injectable 25 Gram(s) IV Push once  diltiazem    Tablet 60 milliGRAM(s) Oral three times a day  dronabinol 2.5 milliGRAM(s) Oral two times a day before meals  glucagon  Injectable 1 milliGRAM(s) IntraMuscular once  imipramine 50 milliGRAM(s) Oral daily  insulin lispro (ADMELOG) corrective regimen sliding scale   SubCutaneous three times a day before meals  metoprolol tartrate 100 milliGRAM(s) Oral two times a day  mirtazapine 7.5 milliGRAM(s) Oral at bedtime  multivitamin 1 Tablet(s) Oral daily  pantoprazole   Suspension 40 milliGRAM(s) Oral before breakfast  senna 2 Tablet(s) Oral at bedtime    MEDICATIONS  (PRN):  acetaminophen     Tablet .. 650 milliGRAM(s) Oral every 6 hours PRN Temp greater or equal to 38C (100.4F), Mild Pain (1 - 3)  aluminum hydroxide/magnesium hydroxide/simethicone Suspension 30 milliLiter(s) Oral every 4 hours PRN Dyspepsia  benzonatate 100 milliGRAM(s) Oral every 8 hours PRN Cough  dextrose Oral Gel 15 Gram(s) Oral once PRN Blood Glucose LESS THAN 70 milliGRAM(s)/deciliter  ondansetron Injectable 4 milliGRAM(s) IV Push every 8 hours PRN Nausea and/or Vomiting              Vital Signs Last 24 Hrs  T(C): 36.5 (23 May 2023 09:47), Max: 37 (23 May 2023 04:32)  T(F): 97.7 (23 May 2023 09:47), Max: 98.6 (23 May 2023 04:32)  HR: 70 (23 May 2023 09:47) (70 - 83)  BP: 137/52 (23 May 2023 09:47) (137/52 - 163/71)  BP(mean): --  RR: 18 (23 May 2023 09:47) (18 - 19)  SpO2: 96% (23 May 2023 09:47) (89% - 98%)    Parameters below as of 23 May 2023 09:47  Patient On (Oxygen Delivery Method): nasal cannula  O2 Flow (L/min): 3                LABS:                        9.6    11.69 )-----------( 355      ( 21 May 2023 11:13 )             31.4     05-21    134<L>  |  99  |  22  ----------------------------<  307<H>  3.6   |  29  |  3.50<H>    Ca    8.4<L>      21 May 2023 11:13                WBC:  WBC Count: 11.69 K/uL (05-21 @ 11:13)  WBC Count: 13.77 K/uL (05-20 @ 07:52)  WBC Count: 12.09 K/uL (05-19 @ 12:12)      MICROBIOLOGY:  RECENT CULTURES:  05-18 Pleural Fl Pleural Fluid XXXX   polymorphonuclear leukocytes seen  No organisms seen  by cytocentrifuge   Culture is being performed.    05-17 .Blood Blood-Peripheral XXXX XXXX   No Growth Final    05-17 .Blood Blood-Peripheral XXXX XXXX   No Growth Final                    Sodium:  Sodium, Serum: 134 mmol/L (05-21 @ 11:13)  Sodium, Serum: 136 mmol/L (05-20 @ 08:59)  Sodium, Serum: 133 mmol/L (05-19 @ 12:12)      3.50 mg/dL 05-21 @ 11:13  5.30 mg/dL 05-20 @ 08:59  3.90 mg/dL 05-19 @ 12:12      Hemoglobin:  Hemoglobin: 9.6 g/dL (05-21 @ 11:13)  Hemoglobin: 10.3 g/dL (05-20 @ 07:52)  Hemoglobin: 9.7 g/dL (05-19 @ 12:12)      Platelets: Platelet Count - Automated: 355 K/uL (05-21 @ 11:13)  Platelet Count - Automated: 317 K/uL (05-20 @ 07:52)  Platelet Count - Automated: 299 K/uL (05-19 @ 12:12)              RADIOLOGY & ADDITIONAL STUDIES:      MICROBIOLOGY:  RECENT CULTURES:  05-18 Pleural Fl Pleural Fluid XXXX   polymorphonuclear leukocytes seen  No organisms seen  by cytocentrifuge   Culture is being performed.    05-17 .Blood Blood-Peripheral XXXX XXXX   No Growth Final    05-17 .Blood Blood-Peripheral XXXX XXXX   No Growth Final

## 2023-05-23 NOTE — PROGRESS NOTE ADULT - SUBJECTIVE AND OBJECTIVE BOX
Patient is a 72y Female with a known history of :  Rapid atrial fibrillation [I48.91]    Chronic combined systolic and diastolic heart failure [I50.42]    ESRD on dialysis [N18.6]    MDD (major depressive disorder) [F32.9]    FTT (failure to thrive) in adult [R62.7]    Prophylactic measure [Z29.9]    Viral syndrome [B34.9]    Pneumonia [J18.9]    HTN (hypertension) [I10]    Pleural effusion, left [J90]    Acute metabolic encephalopathy [G93.41]    Dysphagia [R13.10]      HPI:  73yo female bib ems with cough for 2 days. pt c/o dry cough, no fever, chills no sob, pt states she got dialysis yesterday, +smoking hx no other complaintsRVP positive for enterovirus /Chest xray at [facility] showed L lung opacification .In ER found to have rapid afib and seen by cardiologist Admitted  to telemetry unit for monitoring , send 3 sets of cardiac enzymes to rule out acute coronary event, obtain ECHO to evaluate LVEF, cardiology consult  ,continue current management, O2 supply, anticoagulation plan as per cardiology consult  chest CT that showed bilateral effusion more in left with compressive atelectasis vs consolidation. Admit to monitored unit for cardiac monitoring, obtain echo to evaluate LVEF, intravenous diuresis as per card consult , monitor ins/outs, monitor renal profile and electrolytes closely ,send 3 sets of enzymes, O2 supply, serial chest xrays, monitor weights and oral intake of fluids, nutritionist consult .Seen b y pulmonologist and ID consult Procalcitonin 0.9 WBC on admission 12k Tmax 99.1 This could be just viral infection causing cough but since Chest CT has questionable consolidations, will cover for CAP. Also procalcitonin is slightly high due to ESRD. Palliative care consult requested ,to discuss advance directives and complete MOLST  (17 May 2023 14:01)      REVIEW OF SYSTEMS:    CONSTITUTIONAL: No fever, weight loss, or fatigue  EYES: No eye pain, visual disturbances, or discharge  ENMT:  No difficulty hearing, tinnitus, vertigo; No sinus or throat pain  NECK: No pain or stiffness  BREASTS: No pain, masses, or nipple discharge  RESPIRATORY: No cough, wheezing, chills or hemoptysis; No shortness of breath  CARDIOVASCULAR: No chest pain, palpitations, dizziness, or leg swelling  GASTROINTESTINAL: No abdominal or epigastric pain. No nausea, vomiting, or hematemesis; No diarrhea or constipation. No melena or hematochezia.  GENITOURINARY: No dysuria, frequency, hematuria, or incontinence  NEUROLOGICAL: No headaches, memory loss, loss of strength, numbness, or tremors  SKIN: No itching, burning, rashes, or lesions   LYMPH NODES: No enlarged glands  ENDOCRINE: No heat or cold intolerance; No hair loss  MUSCULOSKELETAL: No joint pain or swelling; No muscle, back, or extremity pain  PSYCHIATRIC: No depression, anxiety, mood swings, or difficulty sleeping  HEME/LYMPH: No easy bruising, or bleeding gums  ALLERGY AND IMMUNOLOGIC: No hives or eczema    MEDICATIONS  (STANDING):  apixaban 2.5 milliGRAM(s) Oral two times a day  aspirin enteric coated 81 milliGRAM(s) Oral daily  cloNIDine 0.1 milliGRAM(s) Oral two times a day  dextrose 5%. 1000 milliLiter(s) (100 mL/Hr) IV Continuous <Continuous>  dextrose 5%. 1000 milliLiter(s) (50 mL/Hr) IV Continuous <Continuous>  dextrose 50% Injectable 25 Gram(s) IV Push once  dextrose 50% Injectable 12.5 Gram(s) IV Push once  dextrose 50% Injectable 25 Gram(s) IV Push once  diltiazem    Tablet 60 milliGRAM(s) Oral three times a day  dronabinol 2.5 milliGRAM(s) Oral two times a day before meals  glucagon  Injectable 1 milliGRAM(s) IntraMuscular once  imipramine 50 milliGRAM(s) Oral daily  insulin lispro (ADMELOG) corrective regimen sliding scale   SubCutaneous three times a day before meals  metoprolol tartrate 100 milliGRAM(s) Oral two times a day  mirtazapine 7.5 milliGRAM(s) Oral at bedtime  multivitamin 1 Tablet(s) Oral daily  pantoprazole   Suspension 40 milliGRAM(s) Oral before breakfast  senna 2 Tablet(s) Oral at bedtime    MEDICATIONS  (PRN):  acetaminophen     Tablet .. 650 milliGRAM(s) Oral every 6 hours PRN Temp greater or equal to 38C (100.4F), Mild Pain (1 - 3)  aluminum hydroxide/magnesium hydroxide/simethicone Suspension 30 milliLiter(s) Oral every 4 hours PRN Dyspepsia  benzonatate 100 milliGRAM(s) Oral every 8 hours PRN Cough  dextrose Oral Gel 15 Gram(s) Oral once PRN Blood Glucose LESS THAN 70 milliGRAM(s)/deciliter  ondansetron Injectable 4 milliGRAM(s) IV Push every 8 hours PRN Nausea and/or Vomiting      ALLERGIES: No Known Drug Allergies  latex (Hives)      FAMILY HISTORY:  FH: myocardial infarction (Father)    FH: myocardial infarction (Father)    Family history of type 2 diabetes mellitus in brother (Sibling)        PHYSICAL EXAMINATION:  -----------------------------  T(C): 37 (05-23-23 @ 04:32), Max: 37 (05-23-23 @ 04:32)  HR: 82 (05-23-23 @ 04:32) (70 - 83)  BP: 163/71 (05-23-23 @ 04:32) (154/74 - 163/71)  RR: 19 (05-23-23 @ 04:32) (18 - 19)  SpO2: 92% (05-23-23 @ 04:32) (89% - 98%)  Wt(kg): --        VITALS  T(C): 37 (05-23-23 @ 04:32), Max: 37 (05-23-23 @ 04:32)  HR: 82 (05-23-23 @ 04:32) (70 - 83)  BP: 163/71 (05-23-23 @ 04:32) (154/74 - 163/71)  RR: 19 (05-23-23 @ 04:32) (18 - 19)  SpO2: 92% (05-23-23 @ 04:32) (89% - 98%)    Constitutional: well developed, normal appearance, well groomed, well nourished, no deformities and no acute distress.   Eyes: the conjunctiva exhibited no abnormalities and the eyelids demonstrated no xanthelasmas.   HEENT: normal oral mucosa, no oral pallor and no oral cyanosis.   Neck: normal jugular venous A waves present, normal jugular venous V waves present and no jugular venous wilson A waves.   Pulmonary: no respiratory distress, normal respiratory rhythm and effort, no accessory muscle use and lungs were clear to auscultation bilaterally.   Cardiovascular: heart rate and rhythm were normal, normal S1 and S2 and no murmur, gallop, rub, heave or thrill are present.   Abdomen: soft, non-tender, no hepato-splenomegaly and no abdominal mass palpated.   Musculoskeletal: the gait could not be assessed..   Extremities: no clubbing of the fingernails, no localized cyanosis, no petechial hemorrhages and no ischemic changes.   Skin: normal skin color and pigmentation, no rash, no venous stasis, no skin lesions, no skin ulcer and no xanthoma was observed.   Psychiatric: oriented to person, place, and time, the affect was normal, the mood was normal and not feeling anxious.     LABS:   --------  05-21    134<L>  |  99  |  22  ----------------------------<  307<H>  3.6   |  29  |  3.50<H>    Ca    8.4<L>      21 May 2023 11:13                           9.6    11.69 )-----------( 355      ( 21 May 2023 11:13 )             31.4                 RADIOLOGY:  -----------------    ECG:     ECHO:

## 2023-05-23 NOTE — PROGRESS NOTE ADULT - ASSESSMENT
73yo female bib ems with cough for 2 days. pt c/o dry cough, no fever, chills no sob, pt states she got dialysis yesterday, +smoking hx no other complaints RVP positive for enterovirus /Chest xray at [facility] showed L lung opacification    pleural eff  atelectasis  esrd  anemia  OP  OA  URI  HTN  CAD  Smoker    GI eval noted  VS noted  Labs reviewed    pt is DNR DNI  no feeding tube  ok for Dialysis  MOLST filled out - signed - updated

## 2023-05-23 NOTE — DISCHARGE NOTE NURSING/CASE MANAGEMENT/SOCIAL WORK - NSDCVIVACCINE_GEN_ALL_CORE_FT
Tdap; 27-Nov-2015 19:03; Bob Kahn (LEV); [employer]; [id number]; IntraMuscular; Deltoid Left.; 0.5 milliLiter(s); VIS (VIS Published: 09-May-2013, VIS Presented: 27-Nov-2015);

## 2023-05-23 NOTE — SOCIAL WORK PROGRESS NOTE - NSSWPROGRESSNOTE_GEN_ALL_CORE
in anticipation of pending dc to [facility], sw faxed JAYE and notes for review this am. sixto to continue to follow for transition back to [facility] when stable.

## 2023-05-23 NOTE — SOCIAL WORK PROGRESS NOTE - NSSWPROGRESSNOTE_GEN_ALL_CORE
pt for dc today to [facility] after HD treatment. moe coordinated for dc at 6 pm. [facility] aware, confirmed pts bed is available for return today. met with pt at bedside and left message for pts son regarding dc.

## 2023-05-23 NOTE — PROGRESS NOTE ADULT - SUBJECTIVE AND OBJECTIVE BOX
[facility]  INFECTIOUS DISEASES   [address] [place] [address]  Tel: [phone]     Fax: [phone]  ========================================================  MD Zita Alejandra Kaushal, MD Cho, Michelle, MD Sunjit, Jaspal, MD  ========================================================    [id number]  SHILO KOHLER     Follow up: Pneumonia    Lying in bed, looks comfortable, awake and alert, NAD. No complaint.  Had thoracentesis on 5/18, transudate.     PAST MEDICAL & SURGICAL HISTORY:  HTN (hypertension)  h/o Anxiety attack  Depression  h/o Myocardial infarct 2007  h/o Hepatitis A 1969  currently resolved, no symptoms  Murmur, cardiac  h/o Smoking  quitted 3/2012  Anemia secondary to renal failure  ESRD on dialysis  Falls  Ataxia  Type 2 diabetes mellitus  Peripheral vascular disease, unspecified  CAD (coronary artery disease)  Diabetes  coronary stent 2007  s/p Ovarian cyst removal  s/p surgical removal of benign Skin lesion epigastric area  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    Social Hx: Former smoker, no ETOH or drugs     FAMILY HISTORY:  FH: myocardial infarction (Father)    FH: myocardial infarction (Father)    Family history of type 2 diabetes mellitus in brother (Sibling)    Allergies  No Known Drug Allergies  latex (Hives)    MEDICATIONS  (STANDING):  apixaban 2.5 milliGRAM(s) Oral two times a day  aspirin enteric coated 81 milliGRAM(s) Oral daily  cloNIDine 0.1 milliGRAM(s) Oral two times a day  dextrose 5%. 1000 milliLiter(s) (100 mL/Hr) IV Continuous <Continuous>  dextrose 5%. 1000 milliLiter(s) (50 mL/Hr) IV Continuous <Continuous>  dextrose 50% Injectable 25 Gram(s) IV Push once  dextrose 50% Injectable 12.5 Gram(s) IV Push once  dextrose 50% Injectable 25 Gram(s) IV Push once  diltiazem    Tablet 60 milliGRAM(s) Oral three times a day  dronabinol 2.5 milliGRAM(s) Oral two times a day before meals  glucagon  Injectable 1 milliGRAM(s) IntraMuscular once  imipramine 50 milliGRAM(s) Oral daily  insulin lispro (ADMELOG) corrective regimen sliding scale   SubCutaneous three times a day before meals  metoprolol tartrate 100 milliGRAM(s) Oral two times a day  mirtazapine 7.5 milliGRAM(s) Oral at bedtime  multivitamin 1 Tablet(s) Oral daily  pantoprazole   Suspension 40 milliGRAM(s) Oral before breakfast  senna 2 Tablet(s) Oral at bedtime     REVIEW OF SYSTEMS:  CONSTITUTIONAL:  No Fever or chills  HEENT:  No diplopia or blurred vision.  No sore throat or runny nose.  CARDIOVASCULAR:  No chest pain or SOB.  RESPIRATORY:  No cough, shortness of breath, PND or orthopnea.  GASTROINTESTINAL:  No nausea, vomiting or diarrhea.  GENITOURINARY:  No dysuria, frequency or urgency. No Blood in urine  MUSCULOSKELETAL:  no joint aches, no muscle pain  SKIN:  No change in skin, hair or nails.  NEUROLOGIC:  No paresthesias or weakness.  PSYCHIATRIC:  No disorder of thought or mood.  ENDOCRINE:  No heat or cold intolerance, polyuria or polydipsia.  HEMATOLOGICAL:  No easy bruising or bleeding.     Physical Exam:  Vital Signs Last 24 Hrs  T(C): 36.5 (23 May 2023 09:47), Max: 37 (23 May 2023 04:32)  T(F): 97.7 (23 May 2023 09:47), Max: 98.6 (23 May 2023 04:32)  HR: 70 (23 May 2023 09:47) (70 - 82)  BP: 137/52 (23 May 2023 09:47) (137/52 - 163/71)  BP(mean): --  RR: 18 (23 May 2023 09:47) (18 - 19)  SpO2: 96% (23 May 2023 09:47) (92% - 98%)  Parameters below as of 23 May 2023 09:47  Patient On (Oxygen Delivery Method): nasal cannula  O2 Flow (L/min): 3  GEN: NAD  HEENT: normocephalic and atraumatic. EOMI. PERRL.    NECK: Supple.  No lymphadenopathy   LUNGS: Decreased breath sounds bilaterally more in lower lobes  with some crackles both side   HEART: Regular rate and rhythm   ABDOMEN: Soft, nontender, and nondistended.  Positive bowel sounds.    : No CVA tenderness  EXTREMITIES: Without edema.  NEUROLOGIC: grossly intact.  PSYCHIATRIC: Appropriate affect .  SKIN: No rash     Labs:                        10.9   12.01 )-----------( 459      ( 23 May 2023 10:50 )             35.2     05-23    134<L>  |  101  |  40<H>  ----------------------------<  262<H>  4.4   |  30  |  6.30<H>    Ca    9.6      23 May 2023 10:50    Culture - Acid Fast - Body Fluid w/Smear (collected 05-18-23 @ 13:10)  Source: Pleural Fl Pleural Fluid    Culture - Body Fluid with Gram Stain (collected 05-18-23 @ 13:10)  Source: Pleural Fl Pleural Fluid  Gram Stain (05-18-23 @ 22:17):    polymorphonuclear leukocytes seen    No organisms seen    by cytocentrifuge    Culture - Fungal, Body Fluid (collected 05-18-23 @ 13:10)  Source: Pleural Fl Pleural Fluid    Culture - Blood (collected 05-17-23 @ 09:42)  Source: .Blood Blood-Peripheral  Final Report (05-22-23 @ 18:00):    No Growth Final    Culture - Blood (collected 05-17-23 @ 09:40)  Source: .Blood Blood-Peripheral  Final Report (05-22-23 @ 18:00):    No Growth Final    WBC Count: 12.01 K/uL (05-23-23 @ 10:50)  WBC Count: 11.69 K/uL (05-21-23 @ 11:13)  WBC Count: 13.77 K/uL (05-20-23 @ 07:52)  WBC Count: 12.09 K/uL (05-19-23 @ 12:12)    Creatinine, Serum: 6.30 mg/dL (05-23-23 @ 10:50)  Creatinine, Serum: 3.50 mg/dL (05-21-23 @ 11:13)  Creatinine, Serum: 5.30 mg/dL (05-20-23 @ 08:59)  Creatinine, Serum: 3.90 mg/dL (05-19-23 @ 12:12)    Procalcitonin, Serum: 0.86 ng/mL (05-18-23 @ 06:30)  Procalcitonin, Serum: 0.90 ng/mL (05-17-23 @ 09:40)     SARS-CoV-2: NotDetec (05-17-23 @ 09:40)    All imaging and other data have been reviewed.  < from: CT Chest No Cont (05.17.23 @ 10:15) >  IMPRESSION:  Small to moderate right-sided pleural effusion and associated compressive   atelectasis.  Moderate left-sided pleural effusion with atelectasis/airspace   consolidation left lower lobe.  Groundglass and airspace consolidation posterior segment left upper lobe,   could reflect edema or superimposed infection.  Other findings as discussed above.    Assessment and Plan:   71yo woman with PMH of HTN, DM2, ESRD on HD, CAD, depression/anxiety and former smoker was seen in ED with cough for 2 days.   RVP positive for enterovirus  chest CT that showed bilateral effusion more in left with compressive atelectasis vs consolidation.   Procalcitonin 0.9  WBC on admission 12k  Tmax 99.1  RVP enterovirus +   5/18 left thoracentesis looks transudate, culture NGTD    Afebrile, no complaint, o2 with NC 3L. No new event in last few days.     Recommendations:  - Blood cultures NGTD  - Pleural fluid culture NGTD  - Completed 5days of ceftriaxone 1gm daily on 5/21, can watch off antibiotics for now.      Will sign off please call with any question.     Geovany Long MD  Division of Infectious Diseases   Please call ID service at [phone] with any question.    35 minutes spent on total encounter assessing patient, examination, chart review, counseling and coordinating care by the attending physician/nurse/care manager.

## 2023-05-23 NOTE — SOCIAL WORK PROGRESS NOTE - NSSWPROGRESSNOTE_GEN_ALL_CORE
pt dc delayed as HD session hasn't started yet. sw notified [facility], aware. transition now scheduled for tmrw am. moe coordinated with constantin, spoke with Nelia, dispatch who is aware of cancelled dc for today and coordinated moe for tmrw wednesday 5/24 at 11.

## 2023-05-23 NOTE — PROGRESS NOTE ADULT - SUBJECTIVE AND OBJECTIVE BOX
[facility]  Frandy Strong PA-C  [address] [place] [address]  [phone]      INTERVAL HPI/OVERNIGHT EVENTS:  Pt s/e  No reported GI events  Passed MBS    MEDICATIONS  (STANDING):  apixaban 2.5 milliGRAM(s) Oral two times a day  aspirin enteric coated 81 milliGRAM(s) Oral daily  cloNIDine 0.1 milliGRAM(s) Oral two times a day  dextrose 5%. 1000 milliLiter(s) (100 mL/Hr) IV Continuous <Continuous>  dextrose 5%. 1000 milliLiter(s) (50 mL/Hr) IV Continuous <Continuous>  dextrose 50% Injectable 25 Gram(s) IV Push once  dextrose 50% Injectable 12.5 Gram(s) IV Push once  dextrose 50% Injectable 25 Gram(s) IV Push once  diltiazem    Tablet 60 milliGRAM(s) Oral three times a day  dronabinol 2.5 milliGRAM(s) Oral two times a day before meals  glucagon  Injectable 1 milliGRAM(s) IntraMuscular once  imipramine 50 milliGRAM(s) Oral daily  insulin lispro (ADMELOG) corrective regimen sliding scale   SubCutaneous three times a day before meals  metoprolol tartrate 100 milliGRAM(s) Oral two times a day  mirtazapine 7.5 milliGRAM(s) Oral at bedtime  multivitamin 1 Tablet(s) Oral daily  pantoprazole   Suspension 40 milliGRAM(s) Oral before breakfast  senna 2 Tablet(s) Oral at bedtime    MEDICATIONS  (PRN):  acetaminophen     Tablet .. 650 milliGRAM(s) Oral every 6 hours PRN Temp greater or equal to 38C (100.4F), Mild Pain (1 - 3)  aluminum hydroxide/magnesium hydroxide/simethicone Suspension 30 milliLiter(s) Oral every 4 hours PRN Dyspepsia  benzonatate 100 milliGRAM(s) Oral every 8 hours PRN Cough  dextrose Oral Gel 15 Gram(s) Oral once PRN Blood Glucose LESS THAN 70 milliGRAM(s)/deciliter  ondansetron Injectable 4 milliGRAM(s) IV Push every 8 hours PRN Nausea and/or Vomiting      Allergies    No Known Drug Allergies  latex (Hives)        PHYSICAL EXAM:   Vital Signs:  Vital Signs Last 24 Hrs  T(C): 36.5 (23 May 2023 09:47), Max: 37 (23 May 2023 04:32)  T(F): 97.7 (23 May 2023 09:47), Max: 98.6 (23 May 2023 04:32)  HR: 70 (23 May 2023 09:47) (70 - 82)  BP: 137/52 (23 May 2023 09:47) (137/52 - 163/71)  BP(mean): --  RR: 18 (23 May 2023 09:47) (18 - 19)  SpO2: 96% (23 May 2023 09:47) (92% - 98%)    Parameters below as of 23 May 2023 09:47  Patient On (Oxygen Delivery Method): nasal cannula  O2 Flow (L/min): 3    Daily     Daily Weight in k.6 (23 May 2023 04:32)    GENERAL:  Appears stated age  HEENT:  NC/AT  CHEST:  Full & symmetric excursion  HEART:  Regular rhythm  ABDOMEN:  Soft, non-tender, non-distended  EXTEREMITIES:  no cyanosis  SKIN:  No rash  NEURO:  Alert      LABS:                        10.9   12.01 )-----------( 459      ( 23 May 2023 10:50 )             35.2     05-23    134<L>  |  101  |  40<H>  ----------------------------<  262<H>  4.4   |  30  |  6.30<H>    Ca    9.6      23 May 2023 10:50

## 2023-05-23 NOTE — PROGRESS NOTE ADULT - ASSESSMENT
73yo female bib ems with cough for 2 days. pt c/o dry cough, no fever, chills no sob, pt states she got dialysis yesterday, +smoking hx no other complaintsRVP positive for enterovirus /Chest xray at [facility] showed L lung opacification .In ER found to have rapid afib and seen by cardiologist Admitted  to telemetry unit for monitoring , send 3 sets of cardiac enzymes to rule out acute coronary event, obtain ECHO to evaluate LVEF, cardiology consult  ,continue current management, O2 supply, anticoagulation plan as per cardiology consult  chest CT that showed bilateral effusion more in left with compressive atelectasis vs consolidation. Admit to monitored unit for cardiac monitoring, obtain echo to evaluate LVEF, intravenous diuresis as per card consult , monitor ins/outs, monitor renal profile and electrolytes closely ,send 3 sets of enzymes, O2 supply, serial chest xrays, monitor weights and oral intake of fluids, nutritionist consult .Seen b y pulmonologist and ID consult Procalcitonin 0.9 WBC on admission 12k Tmax 99.1 This could be just viral infection causing cough but since Chest CT has questionable consolidations, will cover for CAP. Also procalcitonin is slightly high due to ESRD. Palliative care consult requested ,to discuss advance directives and complete MOLST  (17 May 2023 14:01)        esrd on hd   Excess fluids and waste products will be removed from your blood; your electrolytes will be balanced; your blood pressure will be controlled.      ANEMIA PLAN:  Anemia of chronic disease:  Well controlled by Aranesp  H and H subtherapeutic .  We will continue Aranesp aiming for a HCT of 32-36 %.   We will monitor Iron stores, B12 and RBC folate .      BP monitoring,continue current antihypertensive meds, low salt diet,followup with PMD in 1-2 weeks  cloNIDine 0.1 milliGRAM(s) Oral two times a day

## 2023-05-23 NOTE — DISCHARGE NOTE PROVIDER - ATTENDING DISCHARGE PHYSICAL EXAM:
Pharmacy requesting vitamin d  Last refill 1/12/23    LOV 3/13/23  Next appt          Attending Discharge Physical Examination:

## 2023-05-23 NOTE — PROGRESS NOTE ADULT - ASSESSMENT
REASON FOR VISIT  .. Management of problems listed below      NOTEWORTHY FINDINGS.    REVIEW OF SYMPTOMS   Able to give ROS  Yes     RELIABILITY +/-   CONSTITUTIONAL Weakness Yes    ENDOCRINE  No heat or cold intolerance    ALLERGY No hives  Sore throat No stridor  RESP Shortness of breath YES   NEURO New weakness No   CARDIAC   Palpitations No         PHYSICAL EXAM    HEENT Unremarkable  atraumatic   RESP Fair air entry  Harsh breath sound   CARDIAC S1 S2 No S3     NO JVD    ABDOMEN No hepatosplenomegaly   PEDAL EDEMA present No calf tenderness  NO rash       BEST PRACTICE ISSUES.    HOB ELEVATN.   .. Yes  DVT PPLX.   .. 5/19/2023 apixaba 2.5 x 2 (af)   ..    5/17-5/19/2023 HPSC   ELDER PPLX.   ..    5/17/2023 PROTONIX 40   INFN PPLX.   ..    SP SW AMINAH.    .. 5/21/2023 aminah mince moist   ..  5/19/2023 aminah npo       DIET.    .. 5/21/2023 mince moist   ..  5/17/2023 RENAL   IV fl.  ..      PROCEDURES.  .. 5/18/2023 l thoracentesis 1.5l removd sharan colored   PAST PROCEDURES.    ..     GENERAL DATA .   GOC.    ..    5/19/2023 dnr   ALLGY.  ..    latex                     WT.  ..  5/17/2023 59  BMI.  ..   5/17/2023 19                 ICU STAY.   .. none    PROBLEM/ASSESSMENT/PLAN.  PULM.  . GAS EXCHANGE.  .. Target po 90-95%   . PLEURAL EFFUSION.   . 5/18/2023 s prot 6.4   .. cxr cw 3/7/2023 new sm to mod l effsn   .. ct ch nc 5/17/2023 ct ch  .... Sm to mod r effs  .... mod l pl effs with atelectasis consoliatn l lo lobe   .... ground glass and airspace consolidn post segment chaz   .. 5/17/2023 Pl effsn may be sec to esrd chf or parapneumonia   .. cxr 5/20/2023 improvd l aeratn   .. effusn likely sec to esrd  . PLEURAL FLUID ANALYSIS.  .. 5/18/2023 l thorac 1.5 l   ..5/18 pl fl l 13 m 73 p 5 afb sm (-) g 131 l 102/268 (.38) ph 7.6 pr 2.6/6.4 (.4)  .. Fluid transudate    INFECTION.  . ENTERORHINOVIRUS INFECTION RESP TRACT .  . PNEUMONIA.    .. W 5/17-5/18-5/19-5/21-5/23/2023 w 12.1-15.5 - 12 - 11-12  .. pr 5/17/2023 pr .9   . MICRO.  .. rvp 5/17/2023 enterorhinovirus   .. 5/18 pl fl c (-)   . 5/17 bc (-)   . ABIO.  .. 5/17/2023 ROCEPHIN x 5d Dr CHEEK    .. Manage viral infection with supp care   .. Empiric antibio started by id to cover superimposed bact pneumonia cap or aspiration in setting of esrd    CARDIAC.  .. HYTN.  .. 5/17/2023 CLONIDINE .1 X 2   . CAD.  .. tr 5/17/2023 tr 56  .. 5/17/2023 ASA 81   .. 5/17/2023 ATORVASTAT 40   . A FIB RVR 5/17/2023   .. 5/19/2023 CARDIZEM 60.3   .. 5/17-5/19/2023 4P CARDIZEM DRIP 125  5/H  .. 5/17/2023 METOPROLOL 100.2   .. 5/19/2023 apixaba 2.5 x 2 (af) Dr Yun   . CHF.  .. bnp bnp 175k     HEMAT.  . ANEMIA  .. Hb 5/17-5/18-5/19-5/21-5/23/2023 Hb 10.5- 10.6 - 9.7 - 9.6- 10.9   .. inr 5/17/2023 inr 125     GI.  . DYSPHAGIA   .. Sp 5/21/2023 mince moist aminah    RENAL.  . ESRD  .. Na 5/17/2023 na 133   .. cr 5/17/2023 cr 4.6   .. HD     NEURO.  .. 5/17/2023 GABAPENTIN 100  .. 5/17/2023 IMIPRAMINE 50   .. 5/17/2023 RISPERIDAL .5       OVERALL.  . 72-year-old female former smoker with history of A-fib (not on ac sec fall risk, diabetes, hypertension, hyperlipidemia, ESRD hemodialysis, CHF, CAD/CABG, PVD was sent from [facility] 5/17/2023 with cough for 2 days. pt c/o dry cough, no fever, chills no sob, pt states she got dialysis 5/16/2023 ,   .. Pt was recently admitted 3/7-3/11/2023 and before that 2/22-2/25/2023   .. 5/17/2023 No antibio use last 3 m   .. Pt admitted 5/17/2023 Pulm consulted     COURSE   . Found to have enterorhinovirus and large l effs  . 5/18/2023 l thora done      ACTIVE PROBLEMS .  . ENTERORHINOVIRUS INFECTION RESP TRACT 5/17  . PLEURAL EFFUSION SP l THORA 5/18 TRANSUDATE   . PNEUMONIA 5/17/2023  .... 5/17/2023 JOSE ALEJANDRO CHEEK   . AF RVR 5/17-5/19/2023 Cardizem drip  5/19 apixaba  . ESRD HD     OTHER PROBLEMS.  . CAD.  . HYTN.   . CHF.  . ESRD.    . DYSPHAGIA 5/19/2023   .. 5/21/2023 sp aminah mince moist    PMH.   CAD.  .. HISTORY ho cabg

## 2023-05-23 NOTE — DISCHARGE NOTE PROVIDER - HOSPITAL COURSE
71yo female bib ems with cough for 2 days. pt c/o dry cough, no fever, chills no sob, pt states she got dialysis yesterday, +smoking hx no other complaintsRVP positive for enterovirus /[facility] at [facility] showed L lung opacification .In ER found to have rapid afib and seen by cardiologist Admitted  to telemetry unit for monitoring , send 3 sets of cardiac enzymes to rule out acute coronary event, obtain ECHO to evaluate LVEF, cardiology consult  ,continue current management, O2 supply, anticoagulation plan as per cardiology consult  chest CT that showed bilateral effusion more in left with compressive atelectasis vs consolidation. Admit to monitored unit for cardiac monitoring, obtain echo to evaluate LVEF, intravenous diuresis as per card consult , monitor ins/outs, monitor renal profile and electrolytes closely ,send 3 sets of enzymes, O2 supply, serial chest xrays, monitor weights and oral intake of fluids, nutritionist consult .Seen b y pulmonologist and ID consult Procalcitonin 0.9 WBC on admission 12k Tmax 99.1 This could be just viral infection causing cough but since Chest CT has questionable consolidations, will cover for CAP. Also procalcitonin is slightly high due to ESRD. Palliative care consult requested ,to discuss advance directives and complete MOLST  ·  Problem: Acute metabolic encephalopathy.   ·  Plan: 2/2 to  viral syndrome ,chf and pneumonia ,poa .ESRD& progression of dementia  is contributing factor.  ·  Problem: Rapid atrial fibrillation.   ·  Plan: Admitted  to telemetry unit for monitoring , send 3 sets of cardiac enzymes to rule out acute coronary event, obtain ECHO to evaluate LVEF, cardiology consult  ,continue current management, O2 supply, anticoagulation plan as per cardiology consult.  ·  Problem: Chronic combined systolic and diastolic heart failure.   ·  Plan: Admit to monitored unit for cardiac monitoring, obtain echo to evaluate LVEF, intravenous diuresis prn as per card consult , monitor ins/outs, monitor renal profile and electrolytes closely ,send 3 sets of enzymes, O2 supply, serial chest xrays, monitor weights and oral intake of fluids, nutritionist consult.  ·  Problem: Viral syndrome.   ·  Plan: symptomatic therapy ,antitussives.  ·  Problem: Pneumonia.   ·  Plan: septic workup , abx as per ID , oxygen supply ,pulmonology cons ,SLP eval ,aspiration precautions.  ·  Problem: Pleural effusion, left.   ·  Plan: < from: CT Chest No Cont (05.17.23 @ 10:15) >  Moderate left-sided pleural effusion with atelectasis/airspace   consolidation left lower lobe.  Groundglass and airspace consolidation posterior segment left upper lobe,   could reflect edema or superimposed infection.   THORACOCENTESIS 05/18.  ·  Problem: ESRD on dialysis.   ·  Plan: HD as per nephrologist.  ·  Problem: Dysphagia.   ·  Plan: SLP is following ,aspiration precautions.  ·  Problem: HTN (hypertension).   ·  Plan: continue home medications.  ·  Problem: MDD (major depressive disorder).   ·  Plan; continue home medications.  ·  Problem: FTT (failure to thrive) in adult.   ·  Plan: Admit for iv hydration,monitor renal profile and urine output,nutritionist consult,prealbumin level,serial bmp,nutritional supplements,multivitamins,palliative care evaluuation regarding MOLST completion and artificial nutrition discussion.  ·  Problem: Prophylactic measure.   ·  Plan: Gastrointestinal stress ulcer prophylaxis and DVT prophylaxis administered.

## 2023-05-23 NOTE — DISCHARGE NOTE PROVIDER - NSDCCPCAREPLAN_GEN_ALL_CORE_FT
PRINCIPAL DISCHARGE DIAGNOSIS  Diagnosis: New onset atrial fibrillation  Assessment and Plan of Treatment:       SECONDARY DISCHARGE DIAGNOSES  Diagnosis: Acute metabolic encephalopathy  Assessment and Plan of Treatment:     Diagnosis: Rapid atrial fibrillation  Assessment and Plan of Treatment:     Diagnosis: Viral syndrome  Assessment and Plan of Treatment:     Diagnosis: Pleural effusion, left  Assessment and Plan of Treatment:     Diagnosis: Chronic combined systolic and diastolic heart failure  Assessment and Plan of Treatment:     Diagnosis: ESRD on dialysis  Assessment and Plan of Treatment:     Diagnosis: Dysphagia  Assessment and Plan of Treatment:     Diagnosis: Pneumonia  Assessment and Plan of Treatment:     Diagnosis: MDD (major depressive disorder)  Assessment and Plan of Treatment:     Diagnosis: FTT (failure to thrive) in adult  Assessment and Plan of Treatment:     Diagnosis: HTN (hypertension)  Assessment and Plan of Treatment:

## 2023-05-23 NOTE — PROGRESS NOTE ADULT - SUBJECTIVE AND OBJECTIVE BOX
Date/Time Patient Seen:  		  Referring MD:   Data Reviewed	       Patient is a 72y old  Female who presents with a chief complaint of ABNORMAL XRAY OF CHEST (22 May 2023 12:20)      Subjective/HPI     PAST MEDICAL & SURGICAL HISTORY:  Diabetes mellitus II    HTN (hypertension)    h/o Anxiety attack    Depression    h/o Myocardial infarct 2007    CAD (coronary artery disease)    CAD (coronary artery disease)    h/o Hepatitis A 1969  currently resolved, no symptoms    PAD (peripheral artery disease)    Murmur, cardiac    h/o Smoking  quitted 3/2012    CRF (chronic renal failure), unspecified stage    Dialysis patient    Anemia secondary to renal failure    HTN (hypertension)    Osteomyelitis    ESRD on dialysis    Falls    Ataxia    Type 2 diabetes mellitus    Peripheral vascular disease, unspecified    CAD (coronary artery disease)    Diabetes    coronary stent 2007    s/p Ovarian cyst removal    s/p surgical removal of benign Skin lesion epigastric area    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          Medication list         MEDICATIONS  (STANDING):  apixaban 2.5 milliGRAM(s) Oral two times a day  aspirin enteric coated 81 milliGRAM(s) Oral daily  cloNIDine 0.1 milliGRAM(s) Oral two times a day  dextrose 5%. 1000 milliLiter(s) (100 mL/Hr) IV Continuous <Continuous>  dextrose 5%. 1000 milliLiter(s) (50 mL/Hr) IV Continuous <Continuous>  dextrose 50% Injectable 25 Gram(s) IV Push once  dextrose 50% Injectable 12.5 Gram(s) IV Push once  dextrose 50% Injectable 25 Gram(s) IV Push once  diltiazem    Tablet 60 milliGRAM(s) Oral three times a day  dronabinol 2.5 milliGRAM(s) Oral two times a day before meals  glucagon  Injectable 1 milliGRAM(s) IntraMuscular once  imipramine 50 milliGRAM(s) Oral daily  insulin lispro (ADMELOG) corrective regimen sliding scale   SubCutaneous three times a day before meals  metoprolol tartrate 100 milliGRAM(s) Oral two times a day  mirtazapine 7.5 milliGRAM(s) Oral at bedtime  multivitamin 1 Tablet(s) Oral daily  pantoprazole   Suspension 40 milliGRAM(s) Oral before breakfast  senna 2 Tablet(s) Oral at bedtime    MEDICATIONS  (PRN):  acetaminophen     Tablet .. 650 milliGRAM(s) Oral every 6 hours PRN Temp greater or equal to 38C (100.4F), Mild Pain (1 - 3)  aluminum hydroxide/magnesium hydroxide/simethicone Suspension 30 milliLiter(s) Oral every 4 hours PRN Dyspepsia  benzonatate 100 milliGRAM(s) Oral every 8 hours PRN Cough  dextrose Oral Gel 15 Gram(s) Oral once PRN Blood Glucose LESS THAN 70 milliGRAM(s)/deciliter  ondansetron Injectable 4 milliGRAM(s) IV Push every 8 hours PRN Nausea and/or Vomiting         Vitals log        ICU Vital Signs Last 24 Hrs  T(C): 37 (23 May 2023 04:32), Max: 37 (23 May 2023 04:32)  T(F): 98.6 (23 May 2023 04:32), Max: 98.6 (23 May 2023 04:32)  HR: 82 (23 May 2023 04:32) (70 - 83)  BP: 163/71 (23 May 2023 04:32) (154/74 - 163/71)  BP(mean): --  ABP: --  ABP(mean): --  RR: 19 (23 May 2023 04:32) (18 - 19)  SpO2: 92% (23 May 2023 04:32) (89% - 98%)    O2 Parameters below as of 23 May 2023 04:32  Patient On (Oxygen Delivery Method): nasal cannula  O2 Flow (L/min): 3               Input and Output:  I&O's Detail    21 May 2023 07:01  -  22 May 2023 07:00  --------------------------------------------------------  IN:    Oral Fluid: 530 mL    sodium chloride 0.45%: 200 mL  Total IN: 730 mL    OUT:  Total OUT: 0 mL    Total NET: 730 mL          Lab Data                        9.6    11.69 )-----------( 355      ( 21 May 2023 11:13 )             31.4     05-21    134<L>  |  99  |  22  ----------------------------<  307<H>  3.6   |  29  |  3.50<H>    Ca    8.4<L>      21 May 2023 11:13              Review of Systems	      Objective     Physical Examination    heart s1s2  lung dec BS  head nc      Pertinent Lab findings & Imaging      Dexter:  NO   Adequate UO     I&O's Detail    21 May 2023 07:01  -  22 May 2023 07:00  --------------------------------------------------------  IN:    Oral Fluid: 530 mL    sodium chloride 0.45%: 200 mL  Total IN: 730 mL    OUT:  Total OUT: 0 mL    Total NET: 730 mL               Discussed with:     Cultures:	        Radiology

## 2023-05-23 NOTE — PROGRESS NOTE ADULT - SUBJECTIVE AND OBJECTIVE BOX
Patient is a 72y Female whom presented to the hospital with esrd on hd ,    PAST MEDICAL & SURGICAL HISTORY:  HTN (hypertension)      h/o Anxiety attack      Depression      h/o Myocardial infarct 2007      h/o Hepatitis A 1969  currently resolved, no symptoms      Murmur, cardiac      h/o Smoking  quitted 3/2012      Anemia secondary to renal failure      ESRD on dialysis      Falls      Ataxia      Type 2 diabetes mellitus      Peripheral vascular disease, unspecified      CAD (coronary artery disease)      Diabetes      coronary stent 2007      s/p Ovarian cyst removal      s/p surgical removal of benign Skin lesion epigastric area      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          MEDICATIONS  (STANDING):  aspirin enteric coated 81 milliGRAM(s) Oral daily  atorvastatin 40 milliGRAM(s) Oral at bedtime  cefTRIAXone   IVPB 1000 milliGRAM(s) IV Intermittent every 24 hours  cloNIDine 0.1 milliGRAM(s) Oral two times a day  dextrose 5%. 1000 milliLiter(s) (50 mL/Hr) IV Continuous <Continuous>  dextrose 5%. 1000 milliLiter(s) (100 mL/Hr) IV Continuous <Continuous>  dextrose 50% Injectable 12.5 Gram(s) IV Push once  dextrose 50% Injectable 25 Gram(s) IV Push once  dextrose 50% Injectable 25 Gram(s) IV Push once  diltiazem Infusion 5 mG/Hr (5 mL/Hr) IV Continuous <Continuous>  gabapentin 100 milliGRAM(s) Oral at bedtime  glucagon  Injectable 1 milliGRAM(s) IntraMuscular once  heparin   Injectable 5000 Unit(s) SubCutaneous every 12 hours  imipramine 50 milliGRAM(s) Oral daily  insulin lispro (ADMELOG) corrective regimen sliding scale   SubCutaneous three times a day before meals  melatonin 3 milliGRAM(s) Oral at bedtime  metoprolol tartrate 100 milliGRAM(s) Oral two times a day  multivitamin 1 Tablet(s) Oral daily  pantoprazole    Tablet 40 milliGRAM(s) Oral before breakfast  risperiDONE   Tablet 0.5 milliGRAM(s) Oral at bedtime  senna 2 Tablet(s) Oral at bedtime      Allergies    No Known Drug Allergies  latex (Hives)    Intolerances        SOCIAL HISTORY:  Denies ETOh,Smoking,     FAMILY HISTORY:  FH: myocardial infarction (Father)    FH: myocardial infarction (Father)    Family history of type 2 diabetes mellitus in brother (Sibling)        REVIEW OF SYSTEMS:    CONSTITUTIONAL: No weakness, fevers or chills  EYES/ENT: No visual changes;  no throat pain   NECK: No pain or stiffness  RESPIRATORY: No cough, wheezing, hemoptysis; No shortness of breath  CARDIOVASCULAR: No chest pain or palpitations  GASTROINTESTINAL: No abdominal or epigastric pain. No nausea, vomiting,     No diarrhea or constipation. No melena                         10.9   12.01 )-----------( 459      ( 23 May 2023 10:50 )             35.2       CBC Full  -  ( 23 May 2023 10:50 )  WBC Count : 12.01 K/uL  RBC Count : 3.61 M/uL  Hemoglobin : 10.9 g/dL  Hematocrit : 35.2 %  Platelet Count - Automated : 459 K/uL  Mean Cell Volume : 97.5 fl  Mean Cell Hemoglobin : 30.2 pg  Mean Cell Hemoglobin Concentration : 31.0 gm/dL  Auto Neutrophil # : x  Auto Lymphocyte # : x  Auto Monocyte # : x  Auto Eosinophil # : x  Auto Basophil # : x  Auto Neutrophil % : x  Auto Lymphocyte % : x  Auto Monocyte % : x  Auto Eosinophil % : x  Auto Basophil % : x      05-23    134<L>  |  101  |  40<H>  ----------------------------<  262<H>  4.4   |  30  |  6.30<H>    Ca    9.6      23 May 2023 10:50        CAPILLARY BLOOD GLUCOSE      POCT Blood Glucose.: 243 mg/dL (23 May 2023 11:49)  POCT Blood Glucose.: 228 mg/dL (23 May 2023 07:39)  POCT Blood Glucose.: 187 mg/dL (22 May 2023 16:47)      Vital Signs Last 24 Hrs  T(C): 36.9 (23 May 2023 16:24), Max: 37 (23 May 2023 04:32)  T(F): 98.4 (23 May 2023 16:24), Max: 98.6 (23 May 2023 04:32)  HR: 75 (23 May 2023 16:24) (70 - 82)  BP: 159/67 (23 May 2023 16:24) (137/52 - 163/71)  BP(mean): --  RR: 18 (23 May 2023 16:24) (18 - 19)  SpO2: 92% (23 May 2023 16:24) (92% - 98%)    Parameters below as of 23 May 2023 16:24  Patient On (Oxygen Delivery Method): nasal cannula  O2 Flow (L/min): 2                  PHYSICAL EXAM:    Constitutional: NAD  HEENT: conjunctive   clear   Neck:  No JVD  Respiratory: CTAB  Cardiovascular: S1 and S2  Gastrointestinal: BS+, soft, NT/ND  Extremities: No peripheral edema     Patient is a 72y Female whom presented to the hospital with esrd on hd ,.     PAST MEDICAL & SURGICAL HISTORY:  HTN (hypertension)      h/o Anxiety attack      Depression      h/o Myocardial infarct 2007      h/o Hepatitis A 1969  currently resolved, no symptoms      Murmur, cardiac      h/o Smoking  quitted 3/2012      Anemia secondary to renal failure      ESRD on dialysis      Falls      Ataxia      Type 2 diabetes mellitus      Peripheral vascular disease, unspecified      CAD (coronary artery disease)      Diabetes      coronary stent 2007      s/p Ovarian cyst removal      s/p surgical removal of benign Skin lesion epigastric area      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          MEDICATIONS  (STANDING):  aspirin enteric coated 81 milliGRAM(s) Oral daily  atorvastatin 40 milliGRAM(s) Oral at bedtime  cefTRIAXone   IVPB 1000 milliGRAM(s) IV Intermittent every 24 hours  cloNIDine 0.1 milliGRAM(s) Oral two times a day  dextrose 5%. 1000 milliLiter(s) (50 mL/Hr) IV Continuous <Continuous>  dextrose 5%. 1000 milliLiter(s) (100 mL/Hr) IV Continuous <Continuous>  dextrose 50% Injectable 12.5 Gram(s) IV Push once  dextrose 50% Injectable 25 Gram(s) IV Push once  dextrose 50% Injectable 25 Gram(s) IV Push once  diltiazem Infusion 5 mG/Hr (5 mL/Hr) IV Continuous <Continuous>  gabapentin 100 milliGRAM(s) Oral at bedtime  glucagon  Injectable 1 milliGRAM(s) IntraMuscular once  heparin   Injectable 5000 Unit(s) SubCutaneous every 12 hours  imipramine 50 milliGRAM(s) Oral daily  insulin lispro (ADMELOG) corrective regimen sliding scale   SubCutaneous three times a day before meals  melatonin 3 milliGRAM(s) Oral at bedtime  metoprolol tartrate 100 milliGRAM(s) Oral two times a day  multivitamin 1 Tablet(s) Oral daily  pantoprazole    Tablet 40 milliGRAM(s) Oral before breakfast  risperiDONE   Tablet 0.5 milliGRAM(s) Oral at bedtime  senna 2 Tablet(s) Oral at bedtime      Allergies    No Known Drug Allergies  latex (Hives)    Intolerances        SOCIAL HISTORY:  Denies ETOh,Smoking,     FAMILY HISTORY:  FH: myocardial infarction (Father)    FH: myocardial infarction (Father)    Family history of type 2 diabetes mellitus in brother (Sibling)        REVIEW OF SYSTEMS:    CONSTITUTIONAL: No weakness, fevers or chills  EYES/ENT: No visual changes;  no throat pain   NECK: No pain or stiffness  RESPIRATORY: No cough, wheezing, hemoptysis; No shortness of breath  CARDIOVASCULAR: No chest pain or palpitations  GASTROINTESTINAL: No abdominal or epigastric pain. No nausea, vomiting,     No diarrhea or constipation. No melena                         10.9   12.01 )-----------( 459      ( 23 May 2023 10:50 )             35.2       CBC Full  -  ( 23 May 2023 10:50 )  WBC Count : 12.01 K/uL  RBC Count : 3.61 M/uL  Hemoglobin : 10.9 g/dL  Hematocrit : 35.2 %  Platelet Count - Automated : 459 K/uL  Mean Cell Volume : 97.5 fl  Mean Cell Hemoglobin : 30.2 pg  Mean Cell Hemoglobin Concentration : 31.0 gm/dL  Auto Neutrophil # : x  Auto Lymphocyte # : x  Auto Monocyte # : x  Auto Eosinophil # : x  Auto Basophil # : x  Auto Neutrophil % : x  Auto Lymphocyte % : x  Auto Monocyte % : x  Auto Eosinophil % : x  Auto Basophil % : x      05-23    134<L>  |  101  |  40<H>  ----------------------------<  262<H>  4.4   |  30  |  6.30<H>    Ca    9.6      23 May 2023 10:50        CAPILLARY BLOOD GLUCOSE      POCT Blood Glucose.: 243 mg/dL (23 May 2023 11:49)  POCT Blood Glucose.: 228 mg/dL (23 May 2023 07:39)  POCT Blood Glucose.: 187 mg/dL (22 May 2023 16:47)      Vital Signs Last 24 Hrs  T(C): 36.9 (23 May 2023 16:24), Max: 37 (23 May 2023 04:32)  T(F): 98.4 (23 May 2023 16:24), Max: 98.6 (23 May 2023 04:32)  HR: 75 (23 May 2023 16:24) (70 - 82)  BP: 159/67 (23 May 2023 16:24) (137/52 - 163/71)  BP(mean): --  RR: 18 (23 May 2023 16:24) (18 - 19)  SpO2: 92% (23 May 2023 16:24) (92% - 98%)    Parameters below as of 23 May 2023 16:24  Patient On (Oxygen Delivery Method): nasal cannula  O2 Flow (L/min): 2                  PHYSICAL EXAM:    Constitutional: NAD  HEENT: conjunctive   clear   Neck:  No JVD  Respiratory: CTAB  Cardiovascular: S1 and S2  Gastrointestinal: BS+, soft, NT/ND  Extremities: No peripheral edema

## 2023-05-23 NOTE — PROGRESS NOTE ADULT - SUBJECTIVE AND OBJECTIVE BOX
PROGRESS NOTE  Patient is a 72y old  Female who presents with a chief complaint of ABNORMAL XRAY OF CHEST (23 May 2023 14:35)    Chart and available morning labs /imaging are reviewed electronically , urgent issues addressed . More information  is being added upon completion of rounds , when more information is collected and management discussed with consultants , medical staff and social service/case management on the floor   OVERNIGHT    No new issues reported by medical staff . All above noted Patient is resting in a bed comfortably .Confused ,poor mentation .No distress noted   HPI:  71yo female bib ems with cough for 2 days. pt c/o dry cough, no fever, chills no sob, pt states she got dialysis yesterday, +smoking hx no other complaintsRVP positive for enterovirus /[facility] L lung opacification .In ER found to have rapid afib and seen by cardiologist Admitted  to telemetry unit for monitoring , send 3 sets of cardiac enzymes to rule out acute coronary event, obtain ECHO to evaluate LVEF, cardiology consult  ,continue current management, O2 supply, anticoagulation plan as per cardiology consult  chest CT that showed bilateral effusion more in left with compressive atelectasis vs consolidation. Admit to monitored unit for cardiac monitoring, obtain echo to evaluate LVEF, intravenous diuresis as per card consult , monitor ins/outs, monitor renal profile and electrolytes closely ,send 3 sets of enzymes, O2 supply, serial chest xrays, monitor weights and oral intake of fluids, nutritionist consult .Seen b y pulmonologist and ID consult Procalcitonin 0.9 WBC on admission 12k Tmax 99.1 This could be just viral infection causing cough but since Chest CT has questionable consolidations, will cover for CAP. Also procalcitonin is slightly high due to ESRD. Palliative care consult requested ,to discuss advance directives and complete MOLST  (17 May 2023 14:01)    PAST MEDICAL & SURGICAL HISTORY:  HTN (hypertension)      h/o Anxiety attack      Depression      h/o Myocardial infarct 2007      h/o Hepatitis A 1969  currently resolved, no symptoms      Murmur, cardiac      h/o Smoking  quitted 3/2012      Anemia secondary to renal failure      ESRD on dialysis      Falls      Ataxia      Type 2 diabetes mellitus      Peripheral vascular disease, unspecified      CAD (coronary artery disease)      Diabetes      coronary stent 2007      s/p Ovarian cyst removal      s/p surgical removal of benign Skin lesion epigastric area      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          MEDICATIONS  (STANDING):  apixaban 2.5 milliGRAM(s) Oral two times a day  aspirin enteric coated 81 milliGRAM(s) Oral daily  cloNIDine 0.1 milliGRAM(s) Oral two times a day  dextrose 5%. 1000 milliLiter(s) (100 mL/Hr) IV Continuous <Continuous>  dextrose 5%. 1000 milliLiter(s) (50 mL/Hr) IV Continuous <Continuous>  dextrose 50% Injectable 25 Gram(s) IV Push once  dextrose 50% Injectable 12.5 Gram(s) IV Push once  dextrose 50% Injectable 25 Gram(s) IV Push once  diltiazem    Tablet 60 milliGRAM(s) Oral three times a day  dronabinol 2.5 milliGRAM(s) Oral two times a day before meals  glucagon  Injectable 1 milliGRAM(s) IntraMuscular once  imipramine 50 milliGRAM(s) Oral daily  insulin lispro (ADMELOG) corrective regimen sliding scale   SubCutaneous three times a day before meals  metoprolol tartrate 100 milliGRAM(s) Oral two times a day  mirtazapine 7.5 milliGRAM(s) Oral at bedtime  multivitamin 1 Tablet(s) Oral daily  pantoprazole   Suspension 40 milliGRAM(s) Oral before breakfast  senna 2 Tablet(s) Oral at bedtime    MEDICATIONS  (PRN):  acetaminophen     Tablet .. 650 milliGRAM(s) Oral every 6 hours PRN Temp greater or equal to 38C (100.4F), Mild Pain (1 - 3)  aluminum hydroxide/magnesium hydroxide/simethicone Suspension 30 milliLiter(s) Oral every 4 hours PRN Dyspepsia  benzonatate 100 milliGRAM(s) Oral every 8 hours PRN Cough  dextrose Oral Gel 15 Gram(s) Oral once PRN Blood Glucose LESS THAN 70 milliGRAM(s)/deciliter  ondansetron Injectable 4 milliGRAM(s) IV Push every 8 hours PRN Nausea and/or Vomiting      OBJECTIVE    T(C): 36.5 (05-23-23 @ 09:47), Max: 37 (05-23-23 @ 04:32)  HR: 70 (05-23-23 @ 09:47) (70 - 82)  BP: 137/52 (05-23-23 @ 09:47) (137/52 - 163/71)  RR: 18 (05-23-23 @ 09:47) (18 - 19)  SpO2: 96% (05-23-23 @ 09:47) (92% - 98%)  Wt(kg): --  I&O's Summary    23 May 2023 07:01  -  23 May 2023 14:47  --------------------------------------------------------  IN: 200 mL / OUT: 0 mL / NET: 200 mL          REVIEW OF SYSTEMS:  CONSTITUTIONAL: No fever, weight loss, or fatigue  EYES: No eye pain, visual disturbances, or discharge  ENMT:   No sinus or throat pain  NECK: No pain or stiffness  RESPIRATORY: No cough, wheezing, chills or hemoptysis; No shortness of breath  CARDIOVASCULAR: No chest pain, palpitations, dizziness, or leg swelling  GASTROINTESTINAL: No abdominal pain. No nausea, vomiting; No diarrhea or constipation. No melena or hematochezia.  GENITOURINARY: No dysuria, frequency, hematuria, or incontinence  NEUROLOGICAL: No headaches, memory loss, loss of strength, numbness, or tremors  SKIN: No itching, burning, rashes, or lesions   MUSCULOSKELETAL: No joint pain or swelling; No muscle, back, or extremity pain    PHYSICAL EXAM:  Appearance: NAD. VS past 24 hrs -as above   HEENT:   Moist oral mucosa. Conjunctiva clear b/l.   Neck : supple  Respiratory: Lungs CTAB.  Gastrointestinal:  Soft, nontender. No rebound. No rigidity. BS present	  Cardiovascular: RRR ,S1S2 present  Neurologic: Non-focal. Moving all extremities.  Extremities: No edema. No erythema. No calf tenderness.  Skin: No rashes, No ecchymoses, No cyanosis.	  wounds ,skin lesions-See skin assesment flow sheet   LABS:                        10.9   12.01 )-----------( 459      ( 23 May 2023 10:50 )             35.2     05-23    134<L>  |  101  |  40<H>  ----------------------------<  262<H>  4.4   |  30  |  6.30<H>    Ca    9.6      23 May 2023 10:50      CAPILLARY BLOOD GLUCOSE      POCT Blood Glucose.: 243 mg/dL (23 May 2023 11:49)  POCT Blood Glucose.: 228 mg/dL (23 May 2023 07:39)  POCT Blood Glucose.: 187 mg/dL (22 May 2023 16:47)          Culture - Acid Fast - Body Fluid w/Smear (collected 18 May 2023 13:10)  Source: Pleural Fl Pleural Fluid  Preliminary Report (20 May 2023 15:08):    Culture is being performed.    Culture - Body Fluid with Gram Stain (collected 18 May 2023 13:10)  Source: Pleural Fl Pleural Fluid  Gram Stain (18 May 2023 22:17):    polymorphonuclear leukocytes seen    No organisms seen    by cytocentrifuge  Preliminary Report (19 May 2023 16:08):    No growth    Culture - Fungal, Body Fluid (collected 18 May 2023 13:10)  Source: Pleural Fl Pleural Fluid  Preliminary Report (20 May 2023 15:03):    Culture is being performed. Fungal cultures are held for 4 weeks.    Culture - Blood (collected 17 May 2023 09:42)  Source: .Blood Blood-Peripheral  Final Report (22 May 2023 18:00):    No Growth Final    Culture - Blood (collected 17 May 2023 09:40)  Source: .Blood Blood-Peripheral  Final Report (22 May 2023 18:00):    No Growth Final      RADIOLOGY & ADDITIONAL TESTS:   reviewed elctronically  ASSESSMENT/PLAN: 	    Patient was seen and examined on a day of discharge . Plan of care , discharge medications and recommendations discussed with consultants and clearance for discharge obtained .Social service , case management  and medical staff are aware of plan. Family is notified. Discharge summary  is  prepared electronically-see separate document prepared by me .75minutes spent on this visit, 50% visit time spent in care co-ordination with other attendings and counselling patient  I have discussed care plan with patient and HCP,expressed understanding of problems treatment and their effect and side effects, alternatives in detail,I have asked if they have any questions and concerns and appropriately addressed them to best of my ability

## 2023-05-23 NOTE — PROGRESS NOTE ADULT - SUBJECTIVE AND OBJECTIVE BOX
Neurology follow up note    SHILO [id number]      Interval History:    Patient feels ok no new complaints.    Allergies    No Known Drug Allergies  latex (Hives)    Intolerances        MEDICATIONS    acetaminophen     Tablet .. 650 milliGRAM(s) Oral every 6 hours PRN  aluminum hydroxide/magnesium hydroxide/simethicone Suspension 30 milliLiter(s) Oral every 4 hours PRN  apixaban 2.5 milliGRAM(s) Oral two times a day  aspirin enteric coated 81 milliGRAM(s) Oral daily  benzonatate 100 milliGRAM(s) Oral every 8 hours PRN  cloNIDine 0.1 milliGRAM(s) Oral two times a day  dextrose 5%. 1000 milliLiter(s) IV Continuous <Continuous>  dextrose 5%. 1000 milliLiter(s) IV Continuous <Continuous>  dextrose 50% Injectable 25 Gram(s) IV Push once  dextrose 50% Injectable 12.5 Gram(s) IV Push once  dextrose 50% Injectable 25 Gram(s) IV Push once  dextrose Oral Gel 15 Gram(s) Oral once PRN  diltiazem    Tablet 60 milliGRAM(s) Oral three times a day  dronabinol 2.5 milliGRAM(s) Oral two times a day before meals  glucagon  Injectable 1 milliGRAM(s) IntraMuscular once  imipramine 50 milliGRAM(s) Oral daily  insulin lispro (ADMELOG) corrective regimen sliding scale   SubCutaneous three times a day before meals  metoprolol tartrate 100 milliGRAM(s) Oral two times a day  mirtazapine 7.5 milliGRAM(s) Oral at bedtime  multivitamin 1 Tablet(s) Oral daily  ondansetron Injectable 4 milliGRAM(s) IV Push every 8 hours PRN  pantoprazole   Suspension 40 milliGRAM(s) Oral before breakfast  senna 2 Tablet(s) Oral at bedtime              Vital Signs Last 24 Hrs  T(C): 37 (23 May 2023 04:32), Max: 37 (23 May 2023 04:32)  T(F): 98.6 (23 May 2023 04:32), Max: 98.6 (23 May 2023 04:32)  HR: 82 (23 May 2023 04:32) (70 - 83)  BP: 163/71 (23 May 2023 04:32) (154/74 - 163/71)  BP(mean): --  RR: 19 (23 May 2023 04:32) (18 - 19)  SpO2: 92% (23 May 2023 04:32) (89% - 98%)    Parameters below as of 23 May 2023 04:32  Patient On (Oxygen Delivery Method): nasal cannula  O2 Flow (L/min): 3        REVIEW OF SYSTEMS:  Constitutional:  The patient denies fever, chills or night sweats.  Head:  No headache.  Eyes:  No double vision or blurry vision.  Ears:  No ringing in the ears.  Neck:  No neck pain.  Cardiovascular:  No chest pain.  Respiratory:  Slight shortness of breath.  Abdomen:  No nausea, vomiting or abdominal pain.  Extremities/Neurological:  No numbness and tingling.  Musculoskeletal:  Occasional joint pain.    PHYSICAL EXAMINATION:   HEENT:  Head:  Normocephalic, atraumatic.  Eyes:  No scleral icterus.  Ears:  Hearing decreased bilaterally.  ABDOMEN:  Soft, nontender.  EXTREMITIES:  No clubbing or cyanosis were noted.  NEUROLOGIC:  The patient is arousable to verbal stimuli.  Location was [facility], year was 2023, Upon conversing with the patient, she did seem forgetful, was able to name number of fingers in each eye.  Speech was fluent.  Smile symmetric, was able to follow complex commands, took right hand and touch left ear.  Motor:  Bilateral upper 4/5, bilateral lower 3-/5.  Sensory:  Bilateral upper and lower intact to light touch.  able to follow complex commands      LABS:  CBC Full  -  ( 21 May 2023 11:13 )  WBC Count : 11.69 K/uL  RBC Count : 3.16 M/uL  Hemoglobin : 9.6 g/dL  Hematocrit : 31.4 %  Platelet Count - Automated : 355 K/uL  Mean Cell Volume : 99.4 fl  Mean Cell Hemoglobin : 30.4 pg  Mean Cell Hemoglobin Concentration : 30.6 gm/dL  Auto Neutrophil # : x  Auto Lymphocyte # : x  Auto Monocyte # : x  Auto Eosinophil # : x  Auto Basophil # : x  Auto Neutrophil % : x  Auto Lymphocyte % : x  Auto Monocyte % : x  Auto Eosinophil % : x  Auto Basophil % : x      05-21    134<L>  |  99  |  22  ----------------------------<  307<H>  3.6   |  29  |  3.50<H>    Ca    8.4<L>      21 May 2023 11:13      Hemoglobin A1C:       Vitamin B12         RADIOLOGY  ANALYSIS AND PLAN:  This is a 72-year-old with episode of altered mental status in regards to lethargy.  1.For episode of altered mental status in regards to lethargy, I suspect most likely metabolic encephalopathy secondary to underlying respiratory issues.  I would recommend to check carbon dioxide levels as needed  2.For history of hypertension, monitor systolic blood pressure.  3.For history of hyperlipidemia, continue the patient on her statin.  4.For history of diabetes, strict control of blood sugars.  5.For mild cognitive impairment supportive treatment   6.For history of neuropathy, continue the patient on her gabapentin.    Greater than 40 minutes of time was spent with the patient, plan of care, reviewing data, with greater than 50% of the visit was spent counseling and/or coordinating care with multidisciplinary healthcare team       Neurology follow up note    SHILO [id number]      Interval History:    Patient feels ok no new complaints.    Allergies    No Known Drug Allergies  latex (Hives)    Intolerances        MEDICATIONS    acetaminophen     Tablet .. 650 milliGRAM(s) Oral every 6 hours PRN  aluminum hydroxide/magnesium hydroxide/simethicone Suspension 30 milliLiter(s) Oral every 4 hours PRN  apixaban 2.5 milliGRAM(s) Oral two times a day  aspirin enteric coated 81 milliGRAM(s) Oral daily  benzonatate 100 milliGRAM(s) Oral every 8 hours PRN  cloNIDine 0.1 milliGRAM(s) Oral two times a day  dextrose 5%. 1000 milliLiter(s) IV Continuous <Continuous>  dextrose 5%. 1000 milliLiter(s) IV Continuous <Continuous>  dextrose 50% Injectable 25 Gram(s) IV Push once  dextrose 50% Injectable 12.5 Gram(s) IV Push once  dextrose 50% Injectable 25 Gram(s) IV Push once  dextrose Oral Gel 15 Gram(s) Oral once PRN  diltiazem    Tablet 60 milliGRAM(s) Oral three times a day  dronabinol 2.5 milliGRAM(s) Oral two times a day before meals  glucagon  Injectable 1 milliGRAM(s) IntraMuscular once  imipramine 50 milliGRAM(s) Oral daily  insulin lispro (ADMELOG) corrective regimen sliding scale   SubCutaneous three times a day before meals  metoprolol tartrate 100 milliGRAM(s) Oral two times a day  mirtazapine 7.5 milliGRAM(s) Oral at bedtime  multivitamin 1 Tablet(s) Oral daily  ondansetron Injectable 4 milliGRAM(s) IV Push every 8 hours PRN  pantoprazole   Suspension 40 milliGRAM(s) Oral before breakfast  senna 2 Tablet(s) Oral at bedtime              Vital Signs Last 24 Hrs  T(C): 37 (23 May 2023 04:32), Max: 37 (23 May 2023 04:32)  T(F): 98.6 (23 May 2023 04:32), Max: 98.6 (23 May 2023 04:32)  HR: 82 (23 May 2023 04:32) (70 - 83)  BP: 163/71 (23 May 2023 04:32) (154/74 - 163/71)  BP(mean): --  RR: 19 (23 May 2023 04:32) (18 - 19)  SpO2: 92% (23 May 2023 04:32) (89% - 98%)    Parameters below as of 23 May 2023 04:32  Patient On (Oxygen Delivery Method): nasal cannula  O2 Flow (L/min): 3        REVIEW OF SYSTEMS:  Constitutional:  The patient denies fever, chills or night sweats.  Head:  No headache.  Eyes:  No double vision or blurry vision.  Ears:  No ringing in the ears.  Neck:  No neck pain.  Cardiovascular:  No chest pain.  Respiratory:  Slight shortness of breath.  Abdomen:  No nausea, vomiting or abdominal pain.  Extremities/Neurological:  No numbness and tingling.  Musculoskeletal:  Occasional joint pain.    PHYSICAL EXAMINATION:   HEENT:  Head:  Normocephalic, atraumatic.  Eyes:  No scleral icterus.  Ears:  Hearing decreased bilaterally.  ABDOMEN:  Soft, nontender.  EXTREMITIES:  No clubbing or cyanosis were noted.  NEUROLOGIC:  The patient is arousable to verbal stimuli.  Location was [facility], year was 2023, Upon conversing with the patient, she did seem forgetful, was able to name number of fingers in each eye.  Speech was fluent.  Smile symmetric, was able to follow complex commands, took right hand and touch left ear.  Motor:  Bilateral upper 4/5, bilateral lower 3-/5.  Sensory:  Bilateral upper and lower intact to light touch.  able to follow complex commands      LABS:  CBC Full  -  ( 21 May 2023 11:13 )  WBC Count : 11.69 K/uL  RBC Count : 3.16 M/uL  Hemoglobin : 9.6 g/dL  Hematocrit : 31.4 %  Platelet Count - Automated : 355 K/uL  Mean Cell Volume : 99.4 fl  Mean Cell Hemoglobin : 30.4 pg  Mean Cell Hemoglobin Concentration : 30.6 gm/dL  Auto Neutrophil # : x  Auto Lymphocyte # : x  Auto Monocyte # : x  Auto Eosinophil # : x  Auto Basophil # : x  Auto Neutrophil % : x  Auto Lymphocyte % : x  Auto Monocyte % : x  Auto Eosinophil % : x  Auto Basophil % : x      05-21    134<L>  |  99  |  22  ----------------------------<  307<H>  3.6   |  29  |  3.50<H>    Ca    8.4<L>      21 May 2023 11:13      Hemoglobin A1C:       Vitamin B12         RADIOLOGY  ANALYSIS AND PLAN:  This is a 72-year-old with episode of altered mental status in regards to lethargy.  1.For episode of altered mental status in regards to lethargy, I suspect most likely metabolic encephalopathy secondary to underlying respiratory issues.  I would recommend to check carbon dioxide levels as needed  2.For history of hypertension, monitor systolic blood pressure.  3.For history of hyperlipidemia, continue the patient on her statin.  4.For history of diabetes, strict control of blood sugars.  5.For mild cognitive impairment supportive treatment   6.For history of neuropathy, continue the patient on her gabapentin.    Greater than 37 minutes of time was spent with the patient, plan of care, reviewing data, with greater than 50% of the visit was spent counseling and/or coordinating care with multidisciplinary healthcare team

## 2023-05-23 NOTE — PROGRESS NOTE ADULT - ASSESSMENT
71yo female bib ems with cough for 2 days. pt c/o dry cough, no fever, chills no sob, pt states she got dialysis yesterday, +smoking hx no other complaintsRVP positive for enterovirus /Chest xray at [facility] showed L lung opacification .In ER found to have rapid afib and seen by cardiologist Admitted  to telemetry unit for monitoring , send 3 sets of cardiac enzymes to rule out acute coronary event, obtain ECHO to evaluate LVEF, cardiology consult  ,continue current management, O2 supply, anticoagulation plan as per cardiology consult  chest CT that showed bilateral effusion more in left with compressive atelectasis vs consolidation. Admit to monitored unit for cardiac monitoring, obtain echo to evaluate LVEF, intravenous diuresis as per card consult , monitor ins/outs, monitor renal profile and electrolytes closely ,send 3 sets of enzymes, O2 supply, serial chest xrays, monitor weights and oral intake of fluids, nutritionist consult .Seen b y pulmonologist and ID consult Procalcitonin 0.9 WBC on admission 12k Tmax 99.1 This could be just viral infection causing cough but since Chest CT has questionable consolidations, will cover for CAP. Also procalcitonin is slightly high due to ESRD. Palliative care consult requested ,to discuss advance directives and complete MOLST

## 2023-05-24 VITALS
HEART RATE: 72 BPM | OXYGEN SATURATION: 92 % | TEMPERATURE: 98 F | SYSTOLIC BLOOD PRESSURE: 165 MMHG | RESPIRATION RATE: 18 BRPM | DIASTOLIC BLOOD PRESSURE: 70 MMHG

## 2023-05-24 PROCEDURE — 83735 ASSAY OF MAGNESIUM: CPT

## 2023-05-24 PROCEDURE — 80053 COMPREHEN METABOLIC PANEL: CPT

## 2023-05-24 PROCEDURE — 85730 THROMBOPLASTIN TIME PARTIAL: CPT

## 2023-05-24 PROCEDURE — 83605 ASSAY OF LACTIC ACID: CPT

## 2023-05-24 PROCEDURE — 84443 ASSAY THYROID STIM HORMONE: CPT

## 2023-05-24 PROCEDURE — 71045 X-RAY EXAM CHEST 1 VIEW: CPT

## 2023-05-24 PROCEDURE — 88305 TISSUE EXAM BY PATHOLOGIST: CPT

## 2023-05-24 PROCEDURE — G1004: CPT

## 2023-05-24 PROCEDURE — 84100 ASSAY OF PHOSPHORUS: CPT

## 2023-05-24 PROCEDURE — 87205 SMEAR GRAM STAIN: CPT

## 2023-05-24 PROCEDURE — 89051 BODY FLUID CELL COUNT: CPT

## 2023-05-24 PROCEDURE — 84157 ASSAY OF PROTEIN OTHER: CPT

## 2023-05-24 PROCEDURE — 32555 ASPIRATE PLEURA W/ IMAGING: CPT

## 2023-05-24 PROCEDURE — C1729: CPT

## 2023-05-24 PROCEDURE — 83036 HEMOGLOBIN GLYCOSYLATED A1C: CPT

## 2023-05-24 PROCEDURE — 83986 ASSAY PH BODY FLUID NOS: CPT

## 2023-05-24 PROCEDURE — 93005 ELECTROCARDIOGRAM TRACING: CPT

## 2023-05-24 PROCEDURE — 82042 OTHER SOURCE ALBUMIN QUAN EA: CPT

## 2023-05-24 PROCEDURE — 87070 CULTURE OTHR SPECIMN AEROBIC: CPT

## 2023-05-24 PROCEDURE — 36415 COLL VENOUS BLD VENIPUNCTURE: CPT

## 2023-05-24 PROCEDURE — 97162 PT EVAL MOD COMPLEX 30 MIN: CPT

## 2023-05-24 PROCEDURE — 88108 CYTOPATH CONCENTRATE TECH: CPT

## 2023-05-24 PROCEDURE — 97166 OT EVAL MOD COMPLEX 45 MIN: CPT

## 2023-05-24 PROCEDURE — 87075 CULTR BACTERIA EXCEPT BLOOD: CPT

## 2023-05-24 PROCEDURE — 83880 ASSAY OF NATRIURETIC PEPTIDE: CPT

## 2023-05-24 PROCEDURE — 87206 SMEAR FLUORESCENT/ACID STAI: CPT

## 2023-05-24 PROCEDURE — 82947 ASSAY GLUCOSE BLOOD QUANT: CPT

## 2023-05-24 PROCEDURE — 96374 THER/PROPH/DIAG INJ IV PUSH: CPT

## 2023-05-24 PROCEDURE — 96376 TX/PRO/DX INJ SAME DRUG ADON: CPT

## 2023-05-24 PROCEDURE — 99261: CPT

## 2023-05-24 PROCEDURE — 85025 COMPLETE CBC W/AUTO DIFF WBC: CPT

## 2023-05-24 PROCEDURE — 96375 TX/PRO/DX INJ NEW DRUG ADDON: CPT

## 2023-05-24 PROCEDURE — 87040 BLOOD CULTURE FOR BACTERIA: CPT

## 2023-05-24 PROCEDURE — 83615 LACTATE (LD) (LDH) ENZYME: CPT

## 2023-05-24 PROCEDURE — 82945 GLUCOSE OTHER FLUID: CPT

## 2023-05-24 PROCEDURE — 87102 FUNGUS ISOLATION CULTURE: CPT

## 2023-05-24 PROCEDURE — 82803 BLOOD GASES ANY COMBINATION: CPT

## 2023-05-24 PROCEDURE — 80048 BASIC METABOLIC PNL TOTAL CA: CPT

## 2023-05-24 PROCEDURE — 82962 GLUCOSE BLOOD TEST: CPT

## 2023-05-24 PROCEDURE — 71250 CT THORAX DX C-: CPT | Mod: MF

## 2023-05-24 PROCEDURE — 87116 MYCOBACTERIA CULTURE: CPT

## 2023-05-24 PROCEDURE — 99233 SBSQ HOSP IP/OBS HIGH 50: CPT

## 2023-05-24 PROCEDURE — 84145 PROCALCITONIN (PCT): CPT

## 2023-05-24 PROCEDURE — 85027 COMPLETE CBC AUTOMATED: CPT

## 2023-05-24 PROCEDURE — 0225U NFCT DS DNA&RNA 21 SARSCOV2: CPT

## 2023-05-24 PROCEDURE — 84484 ASSAY OF TROPONIN QUANT: CPT

## 2023-05-24 PROCEDURE — 85610 PROTHROMBIN TIME: CPT

## 2023-05-24 PROCEDURE — 87015 SPECIMEN INFECT AGNT CONCNTJ: CPT

## 2023-05-24 PROCEDURE — 99285 EMERGENCY DEPT VISIT HI MDM: CPT | Mod: 25

## 2023-05-24 PROCEDURE — 84311 SPECTROPHOTOMETRY: CPT

## 2023-05-24 PROCEDURE — 92610 EVALUATE SWALLOWING FUNCTION: CPT

## 2023-05-24 PROCEDURE — 84478 ASSAY OF TRIGLYCERIDES: CPT

## 2023-05-24 RX ADMIN — Medication 2.5 MILLIGRAM(S): at 05:20

## 2023-05-24 RX ADMIN — Medication 100 MILLIGRAM(S): at 05:18

## 2023-05-24 RX ADMIN — Medication 0.1 MILLIGRAM(S): at 05:18

## 2023-05-24 RX ADMIN — APIXABAN 2.5 MILLIGRAM(S): 2.5 TABLET, FILM COATED ORAL at 05:18

## 2023-05-24 RX ADMIN — Medication 60 MILLIGRAM(S): at 05:18

## 2023-05-24 RX ADMIN — PANTOPRAZOLE SODIUM 40 MILLIGRAM(S): 20 TABLET, DELAYED RELEASE ORAL at 05:18

## 2023-05-24 RX ADMIN — Medication 1: at 08:16

## 2023-05-24 NOTE — PROGRESS NOTE ADULT - PROBLEM SELECTOR PROBLEM 3
Viral syndrome
Chronic combined systolic and diastolic heart failure
Viral syndrome
Chronic combined systolic and diastolic heart failure

## 2023-05-24 NOTE — PROGRESS NOTE ADULT - PROBLEM SELECTOR PROBLEM 1
Acute metabolic encephalopathy
Rapid atrial fibrillation
Rapid atrial fibrillation
Acute metabolic encephalopathy
Acute metabolic encephalopathy

## 2023-05-24 NOTE — PROGRESS NOTE ADULT - SUBJECTIVE AND OBJECTIVE BOX
CHIEF COMPLAINT/ REASON FOR VISIT  .. Patient was seen to address the  issue listed under PROBLEM LIST which is located toward bottom of this note     SHILO KOHLER    PLV TELE 309 D1    Allergies    No Known Drug Allergies  latex (Hives)    Intolerances        PAST MEDICAL & SURGICAL HISTORY:  HTN (hypertension)      h/o Anxiety attack      Depression      h/o Myocardial infarct 2007      h/o Hepatitis A 1969  currently resolved, no symptoms      Murmur, cardiac      h/o Smoking  quitted 3/2012      Anemia secondary to renal failure      ESRD on dialysis      Falls      Ataxia      Type 2 diabetes mellitus      Peripheral vascular disease, unspecified      CAD (coronary artery disease)      Diabetes      coronary stent 2007      s/p Ovarian cyst removal      s/p surgical removal of benign Skin lesion epigastric area      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          FAMILY HISTORY:  FH: myocardial infarction (Father)    FH: myocardial infarction (Father)    Family history of type 2 diabetes mellitus in brother (Sibling)        Home Medications:  acetaminophen 325 mg oral tablet: 2 tab(s) orally every 6 hours, As needed, Temp greater or equal to 38C (100.4F), Mild Pain (1 - 3) (17 May 2023 14:45)  Admelog SoloStar 100 units/mL injectable solution: injectable 3 times a day (before meals)sliding scale  (17 May 2023 14:45)  apixaban 2.5 mg oral tablet: 1 tab(s) orally 2 times a day (23 May 2023 14:39)  aspirin 81 mg oral delayed release tablet: 1 tab(s) orally once a day (at bedtime) (17 May 2023 14:45)  Basaglar KwikPen 100 units/mL subcutaneous solution: 10 unit(s) subcutaneous once a day (at bedtime) (23 May 2023 14:39)  cloNIDine 0.1 mg oral tablet: 1 tab(s) orally 2 times a day (17 May 2023 14:45)  dilTIAZem 60 mg oral tablet: 1 tab(s) orally 3 times a day (23 May 2023 14:39)  droNABinol 2.5 mg oral capsule: 1 cap(s) orally 2 times a day (before meals) (23 May 2023 14:39)  imipramine 50 mg oral tablet: 1 tab(s) orally once a day (17 May 2023 14:45)  metoprolol tartrate 100 mg oral tablet: 1 tab(s) orally 2 times a day (23 May 2023 14:39)  mirtazapine 7.5 mg oral tablet: 1 tab(s) orally once a day (at bedtime) (23 May 2023 14:39)  Mucinex 600 mg oral tablet, extended release: 1 tab(s) orally 2 times a day (23 May 2023 14:39)  Nephro-Alfie oral tablet: 1 tab(s) orally once a day (17 May 2023 14:45)  PANTOPRAZOLE 40MG DR TAB: 1 tab(s) orally once a day (17 May 2023 14:45)  senna leaf extract oral tablet: 2 tab(s) orally once a day (at bedtime) (23 May 2023 14:39)      MEDICATIONS  (STANDING):  apixaban 2.5 milliGRAM(s) Oral two times a day  aspirin enteric coated 81 milliGRAM(s) Oral daily  cloNIDine 0.1 milliGRAM(s) Oral two times a day  dextrose 5%. 1000 milliLiter(s) (100 mL/Hr) IV Continuous <Continuous>  dextrose 5%. 1000 milliLiter(s) (50 mL/Hr) IV Continuous <Continuous>  dextrose 50% Injectable 25 Gram(s) IV Push once  dextrose 50% Injectable 12.5 Gram(s) IV Push once  dextrose 50% Injectable 25 Gram(s) IV Push once  diltiazem    Tablet 60 milliGRAM(s) Oral three times a day  dronabinol 2.5 milliGRAM(s) Oral two times a day before meals  glucagon  Injectable 1 milliGRAM(s) IntraMuscular once  imipramine 50 milliGRAM(s) Oral daily  insulin lispro (ADMELOG) corrective regimen sliding scale   SubCutaneous three times a day before meals  metoprolol tartrate 100 milliGRAM(s) Oral two times a day  mirtazapine 7.5 milliGRAM(s) Oral at bedtime  multivitamin 1 Tablet(s) Oral daily  pantoprazole   Suspension 40 milliGRAM(s) Oral before breakfast  senna 2 Tablet(s) Oral at bedtime    MEDICATIONS  (PRN):  acetaminophen     Tablet .. 650 milliGRAM(s) Oral every 6 hours PRN Temp greater or equal to 38C (100.4F), Mild Pain (1 - 3)  aluminum hydroxide/magnesium hydroxide/simethicone Suspension 30 milliLiter(s) Oral every 4 hours PRN Dyspepsia  benzonatate 100 milliGRAM(s) Oral every 8 hours PRN Cough  dextrose Oral Gel 15 Gram(s) Oral once PRN Blood Glucose LESS THAN 70 milliGRAM(s)/deciliter  ondansetron Injectable 4 milliGRAM(s) IV Push every 8 hours PRN Nausea and/or Vomiting              Vital Signs Last 24 Hrs  T(C): 36.8 (24 May 2023 04:01), Max: 37.1 (23 May 2023 20:13)  T(F): 98.3 (24 May 2023 04:01), Max: 98.8 (23 May 2023 20:13)  HR: 79 (24 May 2023 04:01) (70 - 92)  BP: 183/76 (24 May 2023 04:01) (137/52 - 183/76)  BP(mean): --  RR: 16 (24 May 2023 04:01) (16 - 18)  SpO2: 98% (24 May 2023 04:01) (92% - 100%)    Parameters below as of 24 May 2023 04:01  Patient On (Oxygen Delivery Method): nasal cannula  O2 Flow (L/min): 2        05-23-23 @ 07:01  -  05-24-23 @ 07:00  --------------------------------------------------------  IN: 570 mL / OUT: 1500 mL / NET: -930 mL              LABS:                        10.9   12.01 )-----------( 459      ( 23 May 2023 10:50 )             35.2     05-23    134<L>  |  101  |  40<H>  ----------------------------<  262<H>  4.4   |  30  |  6.30<H>    Ca    9.6      23 May 2023 10:50                WBC:  WBC Count: 12.01 K/uL (05-23 @ 10:50)  WBC Count: 11.69 K/uL (05-21 @ 11:13)  WBC Count: 13.77 K/uL (05-20 @ 07:52)      MICROBIOLOGY:  RECENT CULTURES:  05-18 Pleural Fl Pleural Fluid XXXX   polymorphonuclear leukocytes seen  No organisms seen  by cytocentrifuge   Culture is being performed.    05-17 .Blood Blood-Peripheral XXXX XXXX   No Growth Final    05-17 .Blood Blood-Peripheral XXXX XXXX   No Growth Final                    Sodium:  Sodium, Serum: 134 mmol/L (05-23 @ 10:50)  Sodium, Serum: 134 mmol/L (05-21 @ 11:13)  Sodium, Serum: 136 mmol/L (05-20 @ 08:59)      6.30 mg/dL 05-23 @ 10:50  3.50 mg/dL 05-21 @ 11:13  5.30 mg/dL 05-20 @ 08:59      Hemoglobin:  Hemoglobin: 10.9 g/dL (05-23 @ 10:50)  Hemoglobin: 9.6 g/dL (05-21 @ 11:13)  Hemoglobin: 10.3 g/dL (05-20 @ 07:52)      Platelets: Platelet Count - Automated: 459 K/uL (05-23 @ 10:50)  Platelet Count - Automated: 355 K/uL (05-21 @ 11:13)  Platelet Count - Automated: 317 K/uL (05-20 @ 07:52)              RADIOLOGY & ADDITIONAL STUDIES:      MICROBIOLOGY:  RECENT CULTURES:  05-18 Pleural Fl Pleural Fluid XXXX   polymorphonuclear leukocytes seen  No organisms seen  by cytocentrifuge   Culture is being performed.    05-17 .Blood Blood-Peripheral XXXX XXXX   No Growth Final    05-17 .Blood Blood-Peripheral XXXX XXXX   No Growth Final

## 2023-05-24 NOTE — PROGRESS NOTE ADULT - SUBJECTIVE AND OBJECTIVE BOX
Patient is a 72y Female with a known history of :  Rapid atrial fibrillation [I48.91]    Chronic combined systolic and diastolic heart failure [I50.42]    ESRD on dialysis [N18.6]    MDD (major depressive disorder) [F32.9]    FTT (failure to thrive) in adult [R62.7]    Prophylactic measure [Z29.9]    Viral syndrome [B34.9]    Pneumonia [J18.9]    HTN (hypertension) [I10]    Pleural effusion, left [J90]    Acute metabolic encephalopathy [G93.41]    Dysphagia [R13.10]      HPI:  73yo female bib ems with cough for 2 days. pt c/o dry cough, no fever, chills no sob, pt states she got dialysis yesterday, +smoking hx no other complaintsRVP positive for enterovirus /C[facility] at [facility] showed L lung opacification .In ER found to have rapid afib and seen by cardiologist Admitted  to telemetry unit for monitoring , send 3 sets of cardiac enzymes to rule out acute coronary event, obtain ECHO to evaluate LVEF, cardiology consult  ,continue current management, O2 supply, anticoagulation plan as per cardiology consult  chest CT that showed bilateral effusion more in left with compressive atelectasis vs consolidation. Admit to monitored unit for cardiac monitoring, obtain echo to evaluate LVEF, intravenous diuresis as per card consult , monitor ins/outs, monitor renal profile and electrolytes closely ,send 3 sets of enzymes, O2 supply, serial chest xrays, monitor weights and oral intake of fluids, nutritionist consult .Seen b y pulmonologist and ID consult Procalcitonin 0.9 WBC on admission 12k Tmax 99.1 This could be just viral infection causing cough but since Chest CT has questionable consolidations, will cover for CAP. Also procalcitonin is slightly high due to ESRD. Palliative care consult requested ,to discuss advance directives and complete MOLST  (17 May 2023 14:01)      REVIEW OF SYSTEMS:    CONSTITUTIONAL: No fever, weight loss, or fatigue  EYES: No eye pain, visual disturbances, or discharge  ENMT:  No difficulty hearing, tinnitus, vertigo; No sinus or throat pain  NECK: No pain or stiffness  BREASTS: No pain, masses, or nipple discharge  RESPIRATORY: No cough, wheezing, chills or hemoptysis; No shortness of breath  CARDIOVASCULAR: No chest pain, palpitations, dizziness, or leg swelling  GASTROINTESTINAL: No abdominal or epigastric pain. No nausea, vomiting, or hematemesis; No diarrhea or constipation. No melena or hematochezia.  GENITOURINARY: No dysuria, frequency, hematuria, or incontinence  NEUROLOGICAL: No headaches, memory loss, loss of strength, numbness, or tremors  SKIN: No itching, burning, rashes, or lesions   LYMPH NODES: No enlarged glands  ENDOCRINE: No heat or cold intolerance; No hair loss  MUSCULOSKELETAL: No joint pain or swelling; No muscle, back, or extremity pain  PSYCHIATRIC: No depression, anxiety, mood swings, or difficulty sleeping  HEME/LYMPH: No easy bruising, or bleeding gums  ALLERGY AND IMMUNOLOGIC: No hives or eczema    MEDICATIONS  (STANDING):  apixaban 2.5 milliGRAM(s) Oral two times a day  aspirin enteric coated 81 milliGRAM(s) Oral daily  cloNIDine 0.1 milliGRAM(s) Oral two times a day  dextrose 5%. 1000 milliLiter(s) (100 mL/Hr) IV Continuous <Continuous>  dextrose 5%. 1000 milliLiter(s) (50 mL/Hr) IV Continuous <Continuous>  dextrose 50% Injectable 25 Gram(s) IV Push once  dextrose 50% Injectable 12.5 Gram(s) IV Push once  dextrose 50% Injectable 25 Gram(s) IV Push once  diltiazem    Tablet 60 milliGRAM(s) Oral three times a day  dronabinol 2.5 milliGRAM(s) Oral two times a day before meals  glucagon  Injectable 1 milliGRAM(s) IntraMuscular once  imipramine 50 milliGRAM(s) Oral daily  insulin lispro (ADMELOG) corrective regimen sliding scale   SubCutaneous three times a day before meals  metoprolol tartrate 100 milliGRAM(s) Oral two times a day  mirtazapine 7.5 milliGRAM(s) Oral at bedtime  multivitamin 1 Tablet(s) Oral daily  pantoprazole   Suspension 40 milliGRAM(s) Oral before breakfast  senna 2 Tablet(s) Oral at bedtime    MEDICATIONS  (PRN):  acetaminophen     Tablet .. 650 milliGRAM(s) Oral every 6 hours PRN Temp greater or equal to 38C (100.4F), Mild Pain (1 - 3)  aluminum hydroxide/magnesium hydroxide/simethicone Suspension 30 milliLiter(s) Oral every 4 hours PRN Dyspepsia  benzonatate 100 milliGRAM(s) Oral every 8 hours PRN Cough  dextrose Oral Gel 15 Gram(s) Oral once PRN Blood Glucose LESS THAN 70 milliGRAM(s)/deciliter  ondansetron Injectable 4 milliGRAM(s) IV Push every 8 hours PRN Nausea and/or Vomiting      ALLERGIES: No Known Drug Allergies  latex (Hives)      FAMILY HISTORY:  FH: myocardial infarction (Father)    FH: myocardial infarction (Father)    Family history of type 2 diabetes mellitus in brother (Sibling)        PHYSICAL EXAMINATION:  -----------------------------  T(C): 36.8 (05-24-23 @ 04:01), Max: 37.1 (05-23-23 @ 20:13)  HR: 79 (05-24-23 @ 04:01) (70 - 92)  BP: 183/76 (05-24-23 @ 04:01) (137/52 - 183/76)  RR: 16 (05-24-23 @ 04:01) (16 - 18)  SpO2: 98% (05-24-23 @ 04:01) (92% - 100%)  Wt(kg): --    05-23 @ 07:01  -  05-24 @ 07:00  --------------------------------------------------------  IN:    Oral Fluid: 570 mL  Total IN: 570 mL    OUT:    Other (mL): 1500 mL  Total OUT: 1500 mL    Total NET: -930 mL            VITALS  T(C): 36.8 (05-24-23 @ 04:01), Max: 37.1 (05-23-23 @ 20:13)  HR: 79 (05-24-23 @ 04:01) (70 - 92)  BP: 183/76 (05-24-23 @ 04:01) (137/52 - 183/76)  RR: 16 (05-24-23 @ 04:01) (16 - 18)  SpO2: 98% (05-24-23 @ 04:01) (92% - 100%)    Constitutional: well developed, normal appearance, well groomed, well nourished, no deformities and no acute distress.   Eyes: the conjunctiva exhibited no abnormalities and the eyelids demonstrated no xanthelasmas.   HEENT: normal oral mucosa, no oral pallor and no oral cyanosis.   Neck: normal jugular venous A waves present, normal jugular venous V waves present and no jugular venous wilson A waves.   Pulmonary: no respiratory distress, normal respiratory rhythm and effort, no accessory muscle use and lungs were clear to auscultation bilaterally.   Cardiovascular: heart rate and rhythm were normal, normal S1 and S2 and no murmur, gallop, rub, heave or thrill are present.   Abdomen: soft, non-tender, no hepato-splenomegaly and no abdominal mass palpated.   Musculoskeletal: the gait could not be assessed..   Extremities: no clubbing of the fingernails, no localized cyanosis, no petechial hemorrhages and no ischemic changes.   Skin: normal skin color and pigmentation, no rash, no venous stasis, no skin lesions, no skin ulcer and no xanthoma was observed.   Psychiatric: oriented to person, place, and time, the affect was normal, the mood was normal and not feeling anxious.     LABS:   --------  05-23    134<L>  |  101  |  40<H>  ----------------------------<  262<H>  4.4   |  30  |  6.30<H>    Ca    9.6      23 May 2023 10:50                           10.9   12.01 )-----------( 459      ( 23 May 2023 10:50 )             35.2                 RADIOLOGY:  -----------------    ECG:     ECHO:

## 2023-05-24 NOTE — PROGRESS NOTE ADULT - ASSESSMENT
73yo female bib ems with cough for 2 days. pt c/o dry cough, no fever, chills no sob, pt states she got dialysis yesterday, +smoking hx no other complaints RVP positive for enterovirus /Chest xray at [facility] showed L lung opacification    pleural eff  atelectasis  esrd  anemia  OP  OA  URI  HTN  CAD  Smoker    GI eval noted  VS noted  Labs reviewed  HD as per RENAL    pt is DNR DNI  no feeding tube  ok for Dialysis  MOLST filled out - signed - updated

## 2023-05-24 NOTE — PROGRESS NOTE ADULT - PROBLEM SELECTOR PLAN 1
2/2 yp viral syndrome ,chf and pneumonia ,poa
2/2 to  viral syndrome ,chf and pneumonia ,poa .ESRD& progression of dementia  is contributing factor
2/2 to  viral syndrome ,chf and pneumonia ,poa .ESRD& progression of dementia  is contributing factor
Admitted  to telemetry unit for monitoring , send 3 sets of cardiac enzymes to rule out acute coronary event, obtain ECHO to evaluate LVEF, cardiology consult  ,continue current management, O2 supply, anticoagulation plan as per cardiology consult
2/2 yp viral syndrome ,chf and pneumonia ,poa
Admitted  to telemetry unit for monitoring , send 3 sets of cardiac enzymes to rule out acute coronary event, obtain ECHO to evaluate LVEF, cardiology consult  ,continue current management, O2 supply, anticoagulation plan as per cardiology consult
2/2 to  viral syndrome ,chf and pneumonia ,poa .ESRD& progression of dementia  is contributing factor

## 2023-05-24 NOTE — PROGRESS NOTE ADULT - ASSESSMENT
REASON FOR VISIT  .. Management of problems listed below      NOTEWORTHY FINDINGS.     PHYSICAL EXAM    HEENT Unremarkable  atraumatic   RESP Fair air entry  Harsh breath sound   CARDIAC S1 S2 No S3     NO JVD    ABDOMEN No hepatosplenomegaly   PEDAL EDEMA present No calf tenderness  NO rash       BEST PRACTICE ISSUES.    HOB ELEVATN.   .. Yes  DVT PPLX.   .. 5/19/2023 apixaba 2.5 x 2 (af)   ..    5/17-5/19/2023 HPSC   ELDER PPLX.   ..    5/17/2023 PROTONIX 40   INFN PPLX.   ..    SP SW AMINAH.    .. 5/21/2023 aminah mince moist   ..  5/19/2023 aminah npo       DIET.    .. 5/21/2023 mince moist   ..  5/17/2023 RENAL   IV fl.  ..      PROCEDURES.  .. 5/18/2023 l thoracentesis 1.5l removd sharan colored   PAST PROCEDURES.    ..     GENERAL DATA .   GOC.    ..    5/19/2023 dnr   ALLGY.  ..    latex                     WT.  ..  5/17/2023 59  BMI.  ..   5/17/2023 19                 ICU STAY.   .. none    DRIPS.  .. CARD 5/- 5/19     ABGS.    VS/ PO/IO/ VENT/ DRIPS.  5/24/2023 afeb 79 180/70   5/24/2023 ra 92%     PROBLEM/ASSESSMENT/PLAN.  PULM.  . GAS EXCHANGE.  .. Target po 90-95%   . PLEURAL EFFUSION.   . 5/18/2023 s prot 6.4   .. cxr cw 3/7/2023 new sm to mod l effsn   .. ct ch nc 5/17/2023 ct ch  .... Sm to mod r effs  .... mod l pl effs with atelectasis consoliatn l lo lobe   .... ground glass and airspace consolidn post segment chaz   .. 5/17/2023 Pl effsn may be sec to esrd chf or parapneumonia   .. cxr 5/20/2023 improvd l aeratn   .. effusn likely sec to esrd  . PLEURAL FLUID ANALYSIS.  .. 5/18/2023 l thorac 1.5 l   ..5/18 pl fl l 13 m 73 p 5 afb sm (-) g 131 l 102/268 (.38) ph 7.6 pr 2.6/6.4 (.4)  .. Fluid transudate    INFECTION.  . ENTERORHINOVIRUS INFECTION RESP TRACT .  . PNEUMONIA.    .. W 5/17-5/18-5/19-5/21-5/23/2023 w 12.1-15.5 - 12 - 11-12  .. pr 5/17/2023 pr .9   . MICRO.  .. rvp 5/17/2023 enterorhinovirus   .. 5/18 pl fl c (-)   . 5/17 bc (-)   . ABIO.  .. 5/17/2023 ROCEPHIN x 5d Dr CHEEK    .. Manage viral infection with supp care   .. Empiric antibio started by id to cover superimposed bact pneumonia cap or aspiration in setting of esrd    CARDIAC.  .. HYTN.  .. 5/17/2023 CLONIDINE .1 X 2   . CAD.  .. tr 5/17/2023 tr 56  .. 5/17/2023 ASA 81   .. 5/17/2023 ATORVASTAT 40   . A FIB RVR 5/17/2023   .. 5/19/2023 CARDIZEM 60.3   .. 5/17-5/19/2023 4P CARDIZEM DRIP 125  5/H  .. 5/17/2023 METOPROLOL 100.2   .. 5/19/2023 apixaba 2.5 x 2 (af) Dr Yun   . CHF.  .. bnp bnp 175k     HEMAT.  . ANEMIA  .. Hb 5/17-5/18-5/19-5/21-5/23/2023 Hb 10.5- 10.6 - 9.7 - 9.6- 10.9   .. inr 5/17/2023 inr 125     GI.  . DYSPHAGIA   .. Sp 5/21/2023 mince moist aminah    RENAL.  . ESRD  .. Na 5/17/2023 na 133   .. cr 5/17/2023 cr 4.6   .. HD     NEURO.  .. 5/17/2023 GABAPENTIN 100  .. 5/17/2023 IMIPRAMINE 50   .. 5/17/2023 RISPERIDAL .5     OVERALL.  . 72-year-old female former smoker with history of A-fib (not on ac sec fall risk, diabetes, hypertension, hyperlipidemia, ESRD hemodialysis, CHF, CAD/CABG, PVD was sent from [facility] 5/17/2023 with cough for 2 days. pt c/o dry cough, no fever, chills no sob, pt states she got dialysis 5/16/2023 ,   .. Pt was recently admitted 3/7-3/11/2023 and before that 2/22-2/25/2023   .. 5/17/2023 No antibio use last 3 m   .. Pt admitted 5/17/2023 Pulm consulted     COURSE   . Found to have enterorhinovirus and large l effs  . 5/18/2023 l thora done      ACTIVE PROBLEMS .  . ENTERORHINOVIRUS INFECTION RESP TRACT 5/17  . PLEURAL EFFUSION SP l THORA 5/18 TRANSUDATE   . PNEUMONIA 5/17/2023  .... 5/17/2023 JOSE ALEJANDRO CHEEK   . AF RVR 5/17-5/19/2023 Cardizem drip  5/19 apixaba  . ESRD HD     OTHER PROBLEMS.  . CAD.  . HYTN.   . CHF.  . ESRD.    . DYSPHAGIA 5/19/2023   .. 5/21/2023 sp aminah mince moist    PMH.   CAD.  .. HISTORY ho cabg  A fib  .. 3/7/2023 Not on ac sec multiple falls  PAD  .. sp R-> L bifem - fem BK pop bypass   .. Fem pop bypass 6/21/2022   .. Partial ray amputation r great toe 6/22/2022   ESRD   .. On HD       TIME SPENT   Over 25 minutes aggregate care time spent on encounter; activities included   direct patient care, counseling and/or coordinating care reviewing notes, lab data/ imaging , discussion with multidisciplinary team/ patient  /family and explaining in detail risks, benefits, alternatives  of the recommendations     JOSE F LAO 72 f 5/17/2023 1948 DR HARRY ALBRIGHT

## 2023-05-24 NOTE — PROGRESS NOTE ADULT - PROBLEM SELECTOR PLAN 2
Admitted  to telemetry unit for monitoring , send 3 sets of cardiac enzymes to rule out acute coronary event, obtain ECHO to evaluate LVEF, cardiology consult  ,continue current management, O2 supply, anticoagulation plan as per cardiology consult
Admit to monitored unit for cardiac monitoring, obtain echo to evaluate LVEF, intravenous diuresis prn as per card consult , monitor ins/outs, monitor renal profile and electrolytes closely ,send 3 sets of enzymes, O2 supply, serial chest xrays, monitor weights and oral intake of fluids, nutritionist consult
Admit to monitored unit for cardiac monitoring, obtain echo to evaluate LVEF, intravenous diuresis prn as per card consult , monitor ins/outs, monitor renal profile and electrolytes closely ,send 3 sets of enzymes, O2 supply, serial chest xrays, monitor weights and oral intake of fluids, nutritionist consult
Admitted  to telemetry unit for monitoring , send 3 sets of cardiac enzymes to rule out acute coronary event, obtain ECHO to evaluate LVEF, cardiology consult  ,continue current management, O2 supply, anticoagulation plan as per cardiology consult

## 2023-05-24 NOTE — PROGRESS NOTE ADULT - PROVIDER SPECIALTY LIST ADULT
Cardiology
Nephrology
Neurology
Palliative Care
Pulmonology
Cardiology
Gastroenterology
Infectious Disease
Infectious Disease
Nephrology
Neurology
Neurology
Pulmonology
Pulmonology
Gastroenterology
Infectious Disease
Infectious Disease
Nephrology
Palliative Care
Pulmonology
Nephrology
Palliative Care
Cardiology
Cardiology
Nephrology
Hospitalist
Cardiology
Hospitalist

## 2023-05-24 NOTE — PROGRESS NOTE ADULT - SUBJECTIVE AND OBJECTIVE BOX
Patient is a 72y Female whom presented to the hospital with esrd on hd ,.     PAST MEDICAL & SURGICAL HISTORY:  HTN (hypertension)      h/o Anxiety attack      Depression      h/o Myocardial infarct 2007      h/o Hepatitis A 1969  currently resolved, no symptoms      Murmur, cardiac      h/o Smoking  quitted 3/2012      Anemia secondary to renal failure      ESRD on dialysis      Falls      Ataxia      Type 2 diabetes mellitus      Peripheral vascular disease, unspecified      CAD (coronary artery disease)      Diabetes      coronary stent 2007      s/p Ovarian cyst removal      s/p surgical removal of benign Skin lesion epigastric area      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          MEDICATIONS  (STANDING):  aspirin enteric coated 81 milliGRAM(s) Oral daily  atorvastatin 40 milliGRAM(s) Oral at bedtime  cefTRIAXone   IVPB 1000 milliGRAM(s) IV Intermittent every 24 hours  cloNIDine 0.1 milliGRAM(s) Oral two times a day  dextrose 5%. 1000 milliLiter(s) (50 mL/Hr) IV Continuous <Continuous>  dextrose 5%. 1000 milliLiter(s) (100 mL/Hr) IV Continuous <Continuous>  dextrose 50% Injectable 12.5 Gram(s) IV Push once  dextrose 50% Injectable 25 Gram(s) IV Push once  dextrose 50% Injectable 25 Gram(s) IV Push once  diltiazem Infusion 5 mG/Hr (5 mL/Hr) IV Continuous <Continuous>  gabapentin 100 milliGRAM(s) Oral at bedtime  glucagon  Injectable 1 milliGRAM(s) IntraMuscular once  heparin   Injectable 5000 Unit(s) SubCutaneous every 12 hours  imipramine 50 milliGRAM(s) Oral daily  insulin lispro (ADMELOG) corrective regimen sliding scale   SubCutaneous three times a day before meals  melatonin 3 milliGRAM(s) Oral at bedtime  metoprolol tartrate 100 milliGRAM(s) Oral two times a day  multivitamin 1 Tablet(s) Oral daily  pantoprazole    Tablet 40 milliGRAM(s) Oral before breakfast  risperiDONE   Tablet 0.5 milliGRAM(s) Oral at bedtime  senna 2 Tablet(s) Oral at bedtime      Allergies    No Known Drug Allergies  latex (Hives)    Intolerances        SOCIAL HISTORY:  Denies ETOh,Smoking,     FAMILY HISTORY:  FH: myocardial infarction (Father)    FH: myocardial infarction (Father)    Family history of type 2 diabetes mellitus in brother (Sibling)        REVIEW OF SYSTEMS:    CONSTITUTIONAL: No weakness, fevers or chills  EYES/ENT: No visual changes;  no throat pain   NECK: No pain or stiffness  RESPIRATORY: No cough, wheezing, hemoptysis; No shortness of breath  CARDIOVASCULAR: No chest pain or palpitations  GASTROINTESTINAL: No abdominal or epigastric pain. No nausea, vomiting,     No diarrhea or constipation. No melena                                               10.9   12.01 )-----------( 459      ( 23 May 2023 10:50 )             35.2       CBC Full  -  ( 23 May 2023 10:50 )  WBC Count : 12.01 K/uL  RBC Count : 3.61 M/uL  Hemoglobin : 10.9 g/dL  Hematocrit : 35.2 %  Platelet Count - Automated : 459 K/uL  Mean Cell Volume : 97.5 fl  Mean Cell Hemoglobin : 30.2 pg  Mean Cell Hemoglobin Concentration : 31.0 gm/dL  Auto Neutrophil # : x  Auto Lymphocyte # : x  Auto Monocyte # : x  Auto Eosinophil # : x  Auto Basophil # : x  Auto Neutrophil % : x  Auto Lymphocyte % : x  Auto Monocyte % : x  Auto Eosinophil % : x  Auto Basophil % : x      05-23    134<L>  |  101  |  40<H>  ----------------------------<  262<H>  4.4   |  30  |  6.30<H>    Ca    9.6      23 May 2023 10:50        CAPILLARY BLOOD GLUCOSE      POCT Blood Glucose.: 175 mg/dL (24 May 2023 07:46)  POCT Blood Glucose.: 120 mg/dL (23 May 2023 21:10)  POCT Blood Glucose.: 135 mg/dL (23 May 2023 18:30)  POCT Blood Glucose.: 243 mg/dL (23 May 2023 11:49)      Vital Signs Last 24 Hrs  T(C): 36.8 (24 May 2023 09:20), Max: 37.1 (23 May 2023 20:13)  T(F): 98.2 (24 May 2023 09:20), Max: 98.8 (23 May 2023 20:13)  HR: 72 (24 May 2023 09:20) (70 - 92)  BP: 165/70 (24 May 2023 09:20) (137/52 - 183/76)  BP(mean): --  RR: 18 (24 May 2023 09:20) (16 - 18)  SpO2: 92% (24 May 2023 09:20) (92% - 100%)    Parameters below as of 24 May 2023 09:20  Patient On (Oxygen Delivery Method): room air                    PHYSICAL EXAM:    Constitutional: NAD  HEENT: conjunctive   clear   Neck:  No JVD  Respiratory: CTAB  Cardiovascular: S1 and S2  Gastrointestinal: BS+, soft, NT/ND  Extremities: No peripheral edema

## 2023-05-24 NOTE — PROGRESS NOTE ADULT - ASSESSMENT
73yo female bib ems with cough for 2 days. pt c/o dry cough, no fever, chills no sob, pt states she got dialysis yesterday, +smoking hx no other complaintsRVP positive for enterovirus /Chest xray at [facility] showed L lung opacification .In ER found to have rapid afib and seen by cardiologist Admitted  to telemetry unit for monitoring , send 3 sets of cardiac enzymes to rule out acute coronary event, obtain ECHO to evaluate LVEF, cardiology consult  ,continue current management, O2 supply, anticoagulation plan as per cardiology consult  chest CT that showed bilateral effusion more in left with compressive atelectasis vs consolidation. Admit to monitored unit for cardiac monitoring, obtain echo to evaluate LVEF, intravenous diuresis as per card consult , monitor ins/outs, monitor renal profile and electrolytes closely ,send 3 sets of enzymes, O2 supply, serial chest xrays, monitor weights and oral intake of fluids, nutritionist consult .Seen b y pulmonologist and ID consult Procalcitonin 0.9 WBC on admission 12k Tmax 99.1 This could be just viral infection causing cough but since Chest CT has questionable consolidations, will cover for CAP. Also procalcitonin is slightly high due to ESRD. Palliative care consult requested ,to discuss advance directives and complete MOLST  (17 May 2023 14:01)        esrd on hd   Excess fluids and waste products will be removed from your blood; your electrolytes will be balanced; your blood pressure will be controlled.      ANEMIA PLAN:  Anemia of chronic disease:  Well controlled by Aranesp  H and H subtherapeutic .  We will continue Aranesp aiming for a HCT of 32-36 %.   We will monitor Iron stores, B12 and RBC folate .      BP monitoring,continue current antihypertensive meds, low salt diet,followup with PMD in 1-2 weeks  cloNIDine 0.1 milliGRAM(s) Oral two times a day

## 2023-05-24 NOTE — PROGRESS NOTE ADULT - PROBLEM SELECTOR PLAN 9
continue home medications
continue home medications
Admit for iv hydration,monitor renal profile and urine output,nutritionist consult,prealbumin level,serial bmp,nutritional supplements,multivitamins,palliative care evaluuation regarding MOLST completion and artificial nutrition discussion
Admit for iv hydration,monitor renal profile and urine output,nutritionist consult,prealbumin level,serial bmp,nutritional supplements,multivitamins,palliative care evaluuation regarding MOLST completion and artificial nutrition discussion
continue home medications

## 2023-05-24 NOTE — PROGRESS NOTE ADULT - ASSESSMENT
71yo female bib ems with cough for 2 days. pt c/o dry cough, no fever, chills no sob, pt states she got dialysis yesterday, +smoking hx no other complaintsRVP positive for enterovirus /C[facility] at [facility] showed L lung opacification .In ER found to have rapid afib and seen by cardiologist Admitted  to telemetry unit for monitoring , send 3 sets of cardiac enzymes to rule out acute coronary event, obtain ECHO to evaluate LVEF, cardiology consult  ,continue current management, O2 supply, anticoagulation plan as per cardiology consult  chest CT that showed bilateral effusion more in left with compressive atelectasis vs consolidation. Admit to monitored unit for cardiac monitoring, obtain echo to evaluate LVEF, intravenous diuresis as per card consult , monitor ins/outs, monitor renal profile and electrolytes closely ,send 3 sets of enzymes, O2 supply, serial chest xrays, monitor weights and oral intake of fluids, nutritionist consult .Seen b y pulmonologist and ID consult Procalcitonin 0.9 WBC on admission 12k Tmax 99.1 This could be just viral infection causing cough but since Chest CT has questionable consolidations, will cover for CAP. Also procalcitonin is slightly high due to ESRD. Palliative care consult requested ,to discuss advance directives and complete MOLST

## 2023-05-24 NOTE — PROGRESS NOTE ADULT - PROBLEM SELECTOR PROBLEM 8
MDD (major depressive disorder)
Dysphagia
Dysphagia
MDD (major depressive disorder)
Dysphagia
HTN (hypertension)
Dysphagia

## 2023-05-24 NOTE — PROGRESS NOTE ADULT - SUBJECTIVE AND OBJECTIVE BOX
PROGRESS NOTE  Patient is a 72y old  Female who presents with a chief complaint of ABNORMAL XRAY OF CHEST (24 May 2023 09:27)    Chart and available morning labs /imaging are reviewed electronically , urgent issues addressed . More information  is being added upon completion of rounds , when more information is collected and management discussed with consultants , medical staff and social service/case management on the floor   OVERNIGHT  No new issues reported by medical staff . All above noted Patient is resting in a bed comfortably .Confused ,poor mentation .No distress noted     HPI:  71yo female bib ems with cough for 2 days. pt c/o dry cough, no fever, chills no sob, pt states she got dialysis yesterday, +smoking hx no other complaintsRVP positive for enterovirus /Chest [facility] at [facility] showed L lung opacification .In ER found to have rapid afib and seen by cardiologist Admitted  to telemetry unit for monitoring , send 3 sets of cardiac enzymes to rule out acute coronary event, obtain ECHO to evaluate LVEF, cardiology consult  ,continue current management, O2 supply, anticoagulation plan as per cardiology consult  chest CT that showed bilateral effusion more in left with compressive atelectasis vs consolidation. Admit to monitored unit for cardiac monitoring, obtain echo to evaluate LVEF, intravenous diuresis as per card consult , monitor ins/outs, monitor renal profile and electrolytes closely ,send 3 sets of enzymes, O2 supply, serial chest xrays, monitor weights and oral intake of fluids, nutritionist consult .Seen b y pulmonologist and ID consult Procalcitonin 0.9 WBC on admission 12k Tmax 99.1 This could be just viral infection causing cough but since Chest CT has questionable consolidations, will cover for CAP. Also procalcitonin is slightly high due to ESRD. Palliative care consult requested ,to discuss advance directives and complete MOLST  (17 May 2023 14:01)    PAST MEDICAL & SURGICAL HISTORY:  HTN (hypertension)      h/o Anxiety attack      Depression      h/o Myocardial infarct 2007      h/o Hepatitis A 1969  currently resolved, no symptoms      Murmur, cardiac      h/o Smoking  quitted 3/2012      Anemia secondary to renal failure      ESRD on dialysis      Falls      Ataxia      Type 2 diabetes mellitus      Peripheral vascular disease, unspecified      CAD (coronary artery disease)      Diabetes      coronary stent 2007      s/p Ovarian cyst removal      s/p surgical removal of benign Skin lesion epigastric area      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          MEDICATIONS  (STANDING):  apixaban 2.5 milliGRAM(s) Oral two times a day  aspirin enteric coated 81 milliGRAM(s) Oral daily  cloNIDine 0.1 milliGRAM(s) Oral two times a day  dextrose 5%. 1000 milliLiter(s) (100 mL/Hr) IV Continuous <Continuous>  dextrose 5%. 1000 milliLiter(s) (50 mL/Hr) IV Continuous <Continuous>  dextrose 50% Injectable 25 Gram(s) IV Push once  dextrose 50% Injectable 12.5 Gram(s) IV Push once  dextrose 50% Injectable 25 Gram(s) IV Push once  diltiazem    Tablet 60 milliGRAM(s) Oral three times a day  dronabinol 2.5 milliGRAM(s) Oral two times a day before meals  glucagon  Injectable 1 milliGRAM(s) IntraMuscular once  imipramine 50 milliGRAM(s) Oral daily  insulin lispro (ADMELOG) corrective regimen sliding scale   SubCutaneous three times a day before meals  metoprolol tartrate 100 milliGRAM(s) Oral two times a day  mirtazapine 7.5 milliGRAM(s) Oral at bedtime  multivitamin 1 Tablet(s) Oral daily  pantoprazole   Suspension 40 milliGRAM(s) Oral before breakfast  senna 2 Tablet(s) Oral at bedtime    MEDICATIONS  (PRN):  acetaminophen     Tablet .. 650 milliGRAM(s) Oral every 6 hours PRN Temp greater or equal to 38C (100.4F), Mild Pain (1 - 3)  aluminum hydroxide/magnesium hydroxide/simethicone Suspension 30 milliLiter(s) Oral every 4 hours PRN Dyspepsia  benzonatate 100 milliGRAM(s) Oral every 8 hours PRN Cough  dextrose Oral Gel 15 Gram(s) Oral once PRN Blood Glucose LESS THAN 70 milliGRAM(s)/deciliter  ondansetron Injectable 4 milliGRAM(s) IV Push every 8 hours PRN Nausea and/or Vomiting      OBJECTIVE    T(C): 36.8 (05-24-23 @ 09:20), Max: 37.1 (05-23-23 @ 20:13)  HR: 72 (05-24-23 @ 09:20) (72 - 92)  BP: 165/70 (05-24-23 @ 09:20) (159/67 - 183/76)  RR: 18 (05-24-23 @ 09:20) (16 - 18)  SpO2: 92% (05-24-23 @ 09:20) (92% - 100%)  Wt(kg): --  I&O's Summary    23 May 2023 07:01  -  24 May 2023 07:00  --------------------------------------------------------  IN: 570 mL / OUT: 1500 mL / NET: -930 mL          REVIEW OF SYSTEMS:  CONSTITUTIONAL: No fever, weight loss, or fatigue  EYES: No eye pain, visual disturbances, or discharge  ENMT:   No sinus or throat pain  NECK: No pain or stiffness  RESPIRATORY: No cough, wheezing, chills or hemoptysis; No shortness of breath  CARDIOVASCULAR: No chest pain, palpitations, dizziness, or leg swelling  GASTROINTESTINAL: No abdominal pain. No nausea, vomiting; No diarrhea or constipation. No melena or hematochezia.  GENITOURINARY: No dysuria, frequency, hematuria, or incontinence  NEUROLOGICAL: No headaches, memory loss, loss of strength, numbness, or tremors  SKIN: No itching, burning, rashes, or lesions   MUSCULOSKELETAL: No joint pain or swelling; No muscle, back, or extremity pain    PHYSICAL EXAM:  Appearance: NAD. VS past 24 hrs -as above   HEENT:   Moist oral mucosa. Conjunctiva clear b/l.   Neck : supple  Respiratory: Lungs CTAB.  Gastrointestinal:  Soft, nontender. No rebound. No rigidity. BS present	  Cardiovascular: RRR ,S1S2 present  Neurologic: Non-focal. Moving all extremities.  Extremities: No edema. No erythema. No calf tenderness.  Skin: No rashes, No ecchymoses, No cyanosis.	  wounds ,skin lesions-See skin assesment flow sheet   LABS:                        10.9   12.01 )-----------( 459      ( 23 May 2023 10:50 )             35.2     05-23    134<L>  |  101  |  40<H>  ----------------------------<  262<H>  4.4   |  30  |  6.30<H>    Ca    9.6      23 May 2023 10:50      CAPILLARY BLOOD GLUCOSE      POCT Blood Glucose.: 175 mg/dL (24 May 2023 07:46)  POCT Blood Glucose.: 120 mg/dL (23 May 2023 21:10)  POCT Blood Glucose.: 135 mg/dL (23 May 2023 18:30)          Culture - Fungal, Body Fluid (collected 18 May 2023 13:10)  Source: Pleural Fl Pleural Fluid  Preliminary Report (20 May 2023 15:03):    Culture is being performed. Fungal cultures are held for 4 weeks.    Culture - Body Fluid with Gram Stain (collected 18 May 2023 13:10)  Source: Pleural Fl Pleural Fluid  Gram Stain (18 May 2023 22:17):    polymorphonuclear leukocytes seen    No organisms seen    by cytocentrifuge  Final Report (23 May 2023 15:26):    No growth at 5 days    Culture - Acid Fast - Body Fluid w/Smear (collected 18 May 2023 13:10)  Source: Pleural Fl Pleural Fluid  Preliminary Report (20 May 2023 15:08):    Culture is being performed.    Culture - Blood (collected 17 May 2023 09:42)  Source: .Blood Blood-Peripheral  Final Report (22 May 2023 18:00):    No Growth Final    Culture - Blood (collected 17 May 2023 09:40)  Source: .Blood Blood-Peripheral  Final Report (22 May 2023 18:00):    No Growth Final      RADIOLOGY & ADDITIONAL TESTS:   reviewed elctronically  ASSESSMENT/PLAN: 	    Patient was seen and examined on a day of discharge . Plan of care , discharge medications and recommendations discussed with consultants and clearance for discharge obtained .Social service , case management  and medical staff are aware of plan. Family is notified. Discharge summary  is  prepared electronically-see separate document prepared by me .75minutes spent on this visit, 50% visit time spent in care co-ordination with other attendings and counselling patient  I have discussed care plan with patient and HCP,expressed understanding of problems treatment and their effect and side effects, alternatives in detail,I have asked if they have any questions and concerns and appropriately addressed them to best of my ability

## 2023-05-24 NOTE — PROGRESS NOTE ADULT - SUBJECTIVE AND OBJECTIVE BOX
Date/Time Patient Seen:  		  Referring MD:   Data Reviewed	       Patient is a 72y old  Female who presents with a chief complaint of ABNORMAL XRAY OF CHEST (23 May 2023 14:35)      Subjective/HPI     PAST MEDICAL & SURGICAL HISTORY:  Diabetes mellitus II    HTN (hypertension)    h/o Anxiety attack    Depression    h/o Myocardial infarct 2007    CAD (coronary artery disease)    CAD (coronary artery disease)    h/o Hepatitis A 1969  currently resolved, no symptoms    PAD (peripheral artery disease)    Murmur, cardiac    h/o Smoking  quitted 3/2012    CRF (chronic renal failure), unspecified stage    Dialysis patient    Anemia secondary to renal failure    HTN (hypertension)    Osteomyelitis    ESRD on dialysis    Falls    Ataxia    Type 2 diabetes mellitus    Peripheral vascular disease, unspecified    CAD (coronary artery disease)    Diabetes    coronary stent 2007    s/p Ovarian cyst removal    s/p surgical removal of benign Skin lesion epigastric area    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          Medication list         MEDICATIONS  (STANDING):  apixaban 2.5 milliGRAM(s) Oral two times a day  aspirin enteric coated 81 milliGRAM(s) Oral daily  cloNIDine 0.1 milliGRAM(s) Oral two times a day  dextrose 5%. 1000 milliLiter(s) (100 mL/Hr) IV Continuous <Continuous>  dextrose 5%. 1000 milliLiter(s) (50 mL/Hr) IV Continuous <Continuous>  dextrose 50% Injectable 25 Gram(s) IV Push once  dextrose 50% Injectable 12.5 Gram(s) IV Push once  dextrose 50% Injectable 25 Gram(s) IV Push once  diltiazem    Tablet 60 milliGRAM(s) Oral three times a day  dronabinol 2.5 milliGRAM(s) Oral two times a day before meals  glucagon  Injectable 1 milliGRAM(s) IntraMuscular once  imipramine 50 milliGRAM(s) Oral daily  insulin lispro (ADMELOG) corrective regimen sliding scale   SubCutaneous three times a day before meals  metoprolol tartrate 100 milliGRAM(s) Oral two times a day  mirtazapine 7.5 milliGRAM(s) Oral at bedtime  multivitamin 1 Tablet(s) Oral daily  pantoprazole   Suspension 40 milliGRAM(s) Oral before breakfast  senna 2 Tablet(s) Oral at bedtime    MEDICATIONS  (PRN):  acetaminophen     Tablet .. 650 milliGRAM(s) Oral every 6 hours PRN Temp greater or equal to 38C (100.4F), Mild Pain (1 - 3)  aluminum hydroxide/magnesium hydroxide/simethicone Suspension 30 milliLiter(s) Oral every 4 hours PRN Dyspepsia  benzonatate 100 milliGRAM(s) Oral every 8 hours PRN Cough  dextrose Oral Gel 15 Gram(s) Oral once PRN Blood Glucose LESS THAN 70 milliGRAM(s)/deciliter  ondansetron Injectable 4 milliGRAM(s) IV Push every 8 hours PRN Nausea and/or Vomiting         Vitals log        ICU Vital Signs Last 24 Hrs  T(C): 36.8 (24 May 2023 04:01), Max: 37.1 (23 May 2023 20:13)  T(F): 98.3 (24 May 2023 04:01), Max: 98.8 (23 May 2023 20:13)  HR: 79 (24 May 2023 04:01) (70 - 92)  BP: 183/76 (24 May 2023 04:01) (137/52 - 183/76)  BP(mean): --  ABP: --  ABP(mean): --  RR: 16 (24 May 2023 04:01) (16 - 18)  SpO2: 98% (24 May 2023 04:01) (92% - 100%)    O2 Parameters below as of 24 May 2023 04:01  Patient On (Oxygen Delivery Method): nasal cannula  O2 Flow (L/min): 2               Input and Output:  I&O's Detail    23 May 2023 07:01  -  24 May 2023 05:40  --------------------------------------------------------  IN:    Oral Fluid: 570 mL  Total IN: 570 mL    OUT:    Other (mL): 1500 mL  Total OUT: 1500 mL    Total NET: -930 mL          Lab Data                        10.9   12.01 )-----------( 459      ( 23 May 2023 10:50 )             35.2     05-23    134<L>  |  101  |  40<H>  ----------------------------<  262<H>  4.4   |  30  |  6.30<H>    Ca    9.6      23 May 2023 10:50              Review of Systems	      Objective     Physical Examination    heart s1s2  lung dec BS  head nc      Pertinent Lab findings & Imaging      Dexter:  NO   Adequate UO     I&O's Detail    23 May 2023 07:01  -  24 May 2023 05:40  --------------------------------------------------------  IN:    Oral Fluid: 570 mL  Total IN: 570 mL    OUT:    Other (mL): 1500 mL  Total OUT: 1500 mL    Total NET: -930 mL               Discussed with:     Cultures:	        Radiology

## 2023-05-24 NOTE — PROGRESS NOTE ADULT - PROBLEM SELECTOR PLAN 8
continue home medications
SLP is following ,aspiration precautions
SLP is following ,aspiration precautions
continue home medications
SLP is following ,aspiration precautions
continue home medications
SLP is following ,aspiration precautions

## 2023-05-24 NOTE — PROGRESS NOTE ADULT - PROBLEM SELECTOR PROBLEM 9
FTT (failure to thrive) in adult
FTT (failure to thrive) in adult
HTN (hypertension)
MDD (major depressive disorder)

## 2023-05-24 NOTE — PROGRESS NOTE ADULT - PROBLEM SELECTOR PLAN 6
< from: CT Chest No Cont (05.17.23 @ 10:15) >  Moderate left-sided pleural effusion with atelectasis/airspace   consolidation left lower lobe.  Groundglass and airspace consolidation posterior segment left upper lobe,   could reflect edema or superimposed infection.   THORACOCENTESIS 05/18
< from: CT Chest No Cont (05.17.23 @ 10:15) >  Moderate left-sided pleural effusion with atelectasis/airspace   consolidation left lower lobe.  Groundglass and airspace consolidation posterior segment left upper lobe,   could reflect edema or superimposed infection.   THORACOCENTESIS 05/18
HD as per nephrologist
< from: CT Chest No Cont (05.17.23 @ 10:15) >  Moderate left-sided pleural effusion with atelectasis/airspace   consolidation left lower lobe.  Groundglass and airspace consolidation posterior segment left upper lobe,   could reflect edema or superimposed infection.   THORACOCENTESIS 05/18
HD as per nephrologist
< from: CT Chest No Cont (05.17.23 @ 10:15) >  Moderate left-sided pleural effusion with atelectasis/airspace   consolidation left lower lobe.  Groundglass and airspace consolidation posterior segment left upper lobe,   could reflect edema or superimposed infection.   THORACOCENTESIS 05/18
< from: CT Chest No Cont (05.17.23 @ 10:15) >  Moderate left-sided pleural effusion with atelectasis/airspace   consolidation left lower lobe.  Groundglass and airspace consolidation posterior segment left upper lobe,   could reflect edema or superimposed infection.   THORACOCENTESIS 05/18

## 2023-05-24 NOTE — PROGRESS NOTE ADULT - PROBLEM SELECTOR PROBLEM 2
Rapid atrial fibrillation
Chronic combined systolic and diastolic heart failure
Rapid atrial fibrillation
Chronic combined systolic and diastolic heart failure

## 2023-05-24 NOTE — PROGRESS NOTE ADULT - PROBLEM SELECTOR PROBLEM 6
Pleural effusion, left
ESRD on dialysis
Pleural effusion, left
ESRD on dialysis
Pleural effusion, left

## 2023-05-24 NOTE — PROGRESS NOTE ADULT - NUTRITIONAL ASSESSMENT
MEDICATIONS  (STANDING):  apixaban 2.5 milliGRAM(s) Oral two times a day  aspirin enteric coated 81 milliGRAM(s) Oral daily  cefTRIAXone   IVPB 1000 milliGRAM(s) IV Intermittent every 24 hours  cloNIDine 0.1 milliGRAM(s) Oral two times a day  dextrose 5%. 1000 milliLiter(s) (100 mL/Hr) IV Continuous <Continuous>  dextrose 5%. 1000 milliLiter(s) (50 mL/Hr) IV Continuous <Continuous>  dextrose 50% Injectable 12.5 Gram(s) IV Push once  dextrose 50% Injectable 25 Gram(s) IV Push once  dextrose 50% Injectable 25 Gram(s) IV Push once  diltiazem    Tablet 60 milliGRAM(s) Oral three times a day  dronabinol 2.5 milliGRAM(s) Oral two times a day before meals  glucagon  Injectable 1 milliGRAM(s) IntraMuscular once  imipramine 50 milliGRAM(s) Oral daily  insulin lispro (ADMELOG) corrective regimen sliding scale   SubCutaneous three times a day before meals  metoprolol tartrate 100 milliGRAM(s) Oral two times a day  mirtazapine 7.5 milliGRAM(s) Oral at bedtime  multivitamin 1 Tablet(s) Oral daily  pantoprazole    Tablet 40 milliGRAM(s) Oral before breakfast  senna 2 Tablet(s) Oral at bedtime
MEDICATIONS  (STANDING):  apixaban 2.5 milliGRAM(s) Oral two times a day  aspirin enteric coated 81 milliGRAM(s) Oral daily  cefTRIAXone   IVPB 1000 milliGRAM(s) IV Intermittent every 24 hours  cloNIDine 0.1 milliGRAM(s) Oral two times a day  dextrose 5%. 1000 milliLiter(s) (100 mL/Hr) IV Continuous <Continuous>  dextrose 5%. 1000 milliLiter(s) (50 mL/Hr) IV Continuous <Continuous>  dextrose 50% Injectable 12.5 Gram(s) IV Push once  dextrose 50% Injectable 25 Gram(s) IV Push once  dextrose 50% Injectable 25 Gram(s) IV Push once  diltiazem    Tablet 60 milliGRAM(s) Oral three times a day  dronabinol 2.5 milliGRAM(s) Oral two times a day before meals  glucagon  Injectable 1 milliGRAM(s) IntraMuscular once  imipramine 50 milliGRAM(s) Oral daily  insulin lispro (ADMELOG) corrective regimen sliding scale   SubCutaneous three times a day before meals  metoprolol tartrate 100 milliGRAM(s) Oral two times a day  mirtazapine 7.5 milliGRAM(s) Oral at bedtime  multivitamin 1 Tablet(s) Oral daily  pantoprazole    Tablet 40 milliGRAM(s) Oral before breakfast  senna 2 Tablet(s) Oral at bedtime
This patient has been assessed with a concern for Malnutrition and has been determined to have a diagnosis/diagnoses of Moderate protein-calorie malnutrition.    This patient is being managed with:   Diet Minced and Moist-  Consistent Carbohydrate {Evening Snack}  Mildly Thick Liquids (MILDTHICKLIQS)  For patients receiving Renal Replacement - No Protein Restr No Conc K No Conc Phos Low Sodium (RENAL)  Supplement Feeding Modality:  Oral  Nepro Cans or Servings Per Day:  1       Frequency:  Three Times a day  Entered: May 22 2023  1:07PM    Diet Minced and Moist-  Consistent Carbohydrate {Evening Snack}  Mildly Thick Liquids (MILDTHICKLIQS)  For patients receiving Renal Replacement - No Protein Restr No Conc K No Conc Phos Low Sodium (RENAL)  Supplement Feeding Modality:  Oral  Nepro Cans or Servings Per Day:  1       Frequency:  Daily  Entered: May 21 2023  1:13PM    The following pending diet order is being considered for treatment of Moderate protein-calorie malnutrition:null
MEDICATIONS  (STANDING):  apixaban 2.5 milliGRAM(s) Oral two times a day  aspirin enteric coated 81 milliGRAM(s) Oral daily  cefTRIAXone   IVPB 1000 milliGRAM(s) IV Intermittent every 24 hours  cloNIDine 0.1 milliGRAM(s) Oral two times a day  dextrose 5%. 1000 milliLiter(s) (100 mL/Hr) IV Continuous <Continuous>  dextrose 5%. 1000 milliLiter(s) (50 mL/Hr) IV Continuous <Continuous>  dextrose 50% Injectable 12.5 Gram(s) IV Push once  dextrose 50% Injectable 25 Gram(s) IV Push once  dextrose 50% Injectable 25 Gram(s) IV Push once  diltiazem    Tablet 60 milliGRAM(s) Oral three times a day  dronabinol 2.5 milliGRAM(s) Oral two times a day before meals  glucagon  Injectable 1 milliGRAM(s) IntraMuscular once  imipramine 50 milliGRAM(s) Oral daily  insulin lispro (ADMELOG) corrective regimen sliding scale   SubCutaneous three times a day before meals  metoprolol tartrate 100 milliGRAM(s) Oral two times a day  mirtazapine 7.5 milliGRAM(s) Oral at bedtime  multivitamin 1 Tablet(s) Oral daily  pantoprazole    Tablet 40 milliGRAM(s) Oral before breakfast  senna 2 Tablet(s) Oral at bedtime
This patient has been assessed with a concern for Malnutrition and has been determined to have a diagnosis/diagnoses of Moderate protein-calorie malnutrition.    This patient is being managed with:   Diet Consistent Carbohydrate w/Evening Snack-  For patients receiving Renal Replacement - No Protein Restr No Conc K No Conc Phos Low Sodium (RENAL)  Supplement Feeding Modality:  Oral  Nepro Cans or Servings Per Day:  1       Frequency:  Three Times a day  Entered: May 18 2023 11:26AM  
This patient has been assessed with a concern for Malnutrition and has been determined to have a diagnosis/diagnoses of Moderate protein-calorie malnutrition.    This patient is being managed with:   Diet Minced and Moist-  Consistent Carbohydrate {Evening Snack}  Mildly Thick Liquids (MILDTHICKLIQS)  For patients receiving Renal Replacement - No Protein Restr No Conc K No Conc Phos Low Sodium (RENAL)  Supplement Feeding Modality:  Oral  Nepro Cans or Servings Per Day:  1       Frequency:  Three Times a day  Entered: May 22 2023  1:07PM    Diet Minced and Moist-  Consistent Carbohydrate {Evening Snack}  Mildly Thick Liquids (MILDTHICKLIQS)  For patients receiving Renal Replacement - No Protein Restr No Conc K No Conc Phos Low Sodium (RENAL)  Supplement Feeding Modality:  Oral  Nepro Cans or Servings Per Day:  1       Frequency:  Daily  Entered: May 21 2023  1:13PM    The following pending diet order is being considered for treatment of Moderate protein-calorie malnutrition:null
This patient has been assessed with a concern for Malnutrition and has been determined to have a diagnosis/diagnoses of Moderate protein-calorie malnutrition.    This patient is being managed with:   Diet Minced and Moist-  Consistent Carbohydrate {Evening Snack}  Mildly Thick Liquids (MILDTHICKLIQS)  For patients receiving Renal Replacement - No Protein Restr No Conc K No Conc Phos Low Sodium (RENAL)  Entered: May 21 2023 12:06PM  
This patient has been assessed with a concern for Malnutrition and has been determined to have a diagnosis/diagnoses of Moderate protein-calorie malnutrition.    This patient is being managed with:   Diet Minced and Moist-  Consistent Carbohydrate {Evening Snack}  Mildly Thick Liquids (MILDTHICKLIQS)  For patients receiving Renal Replacement - No Protein Restr No Conc K No Conc Phos Low Sodium (RENAL)  Supplement Feeding Modality:  Oral  Nepro Cans or Servings Per Day:  1       Frequency:  Three Times a day  Entered: May 22 2023  1:07PM    Diet Minced and Moist-  Consistent Carbohydrate {Evening Snack}  Mildly Thick Liquids (MILDTHICKLIQS)  For patients receiving Renal Replacement - No Protein Restr No Conc K No Conc Phos Low Sodium (RENAL)  Supplement Feeding Modality:  Oral  Nepro Cans or Servings Per Day:  1       Frequency:  Daily  Entered: May 21 2023  1:13PM    The following pending diet order is being considered for treatment of Moderate protein-calorie malnutrition:null
This patient has been assessed with a concern for Malnutrition and has been determined to have a diagnosis/diagnoses of Moderate protein-calorie malnutrition.    This patient is being managed with:   Diet Consistent Carbohydrate w/Evening Snack-  For patients receiving Renal Replacement - No Protein Restr No Conc K No Conc Phos Low Sodium (RENAL)  Supplement Feeding Modality:  Oral  Nepro Cans or Servings Per Day:  1       Frequency:  Three Times a day  Entered: May 18 2023 11:26AM  
This patient has been assessed with a concern for Malnutrition and has been determined to have a diagnosis/diagnoses of Moderate protein-calorie malnutrition.    This patient is being managed with:   Diet Consistent Carbohydrate w/Evening Snack-  For patients receiving Renal Replacement - No Protein Restr No Conc K No Conc Phos Low Sodium (RENAL)  Supplement Feeding Modality:  Oral  Nepro Cans or Servings Per Day:  1       Frequency:  Three Times a day  Entered: May 18 2023 11:26AM    This patient has been assessed with a concern for Malnutrition and has been determined to have a diagnosis/diagnoses of Moderate protein-calorie malnutrition.    This patient is being managed with:   Diet Consistent Carbohydrate w/Evening Snack-  For patients receiving Renal Replacement - No Protein Restr No Conc K No Conc Phos Low Sodium (RENAL)  Supplement Feeding Modality:  Oral  Nepro Cans or Servings Per Day:  1       Frequency:  Three Times a day  Entered: May 18 2023 11:26AM

## 2023-05-24 NOTE — PROGRESS NOTE ADULT - PROBLEM SELECTOR PROBLEM 11
FTT (failure to thrive) in adult
FTT (failure to thrive) in adult
Prophylactic measure
FTT (failure to thrive) in adult
FTT (failure to thrive) in adult

## 2023-05-24 NOTE — PROGRESS NOTE ADULT - PROBLEM SELECTOR PROBLEM 7
HTN (hypertension)
ESRD on dialysis
HTN (hypertension)
ESRD on dialysis
ESRD on dialysis

## 2023-05-24 NOTE — PROGRESS NOTE ADULT - PROBLEM SELECTOR PLAN 4
symptomatic therapy ,antitussives
septic workup , abx as per ID , oxygen supply ,pulmonology cons ,SLP eval ,aspiration precautions
septic workup , abx as per ID , oxygen supply ,pulmonology cons ,SLP eval ,aspiration precautions

## 2023-05-24 NOTE — PROGRESS NOTE ADULT - PROBLEM SELECTOR PLAN 11
Admit for iv hydration,monitor renal profile and urine output,nutritionist consult,prealbumin level,serial bmp,nutritional supplements,multivitamins,palliative care evaluuation regarding MOLST completion and artificial nutrition discussion
Gastrointestinal stress ulcer prophylaxis and DVT prophylaxis administered
Admit for iv hydration,monitor renal profile and urine output,nutritionist consult,prealbumin level,serial bmp,nutritional supplements,multivitamins,palliative care evaluuation regarding MOLST completion and artificial nutrition discussion

## 2023-05-24 NOTE — PROGRESS NOTE ADULT - PROBLEM SELECTOR PLAN 5
< from: CT Chest No Cont (05.17.23 @ 10:15) >  Moderate left-sided pleural effusion with atelectasis/airspace   consolidation left lower lobe.  Groundglass and airspace consolidation posterior segment left upper lobe,   could reflect edema or superimposed infection.   THORACOCENTESIS 05/18
septic workup , abx as per ID , oxygen supply ,pulmonology cons ,SLP eval ,aspiration precautions
septic workup , abx as per ID , oxygen supply ,pulmonology cons ,SLP eval ,aspiration precautions
< from: CT Chest No Cont (05.17.23 @ 10:15) >  Moderate left-sided pleural effusion with atelectasis/airspace   consolidation left lower lobe.  Groundglass and airspace consolidation posterior segment left upper lobe,   could reflect edema or superimposed infection.   THORACOCENTESIS 05/18
septic workup , abx as per ID , oxygen supply ,pulmonology cons ,SLP eval ,aspiration precautions

## 2023-05-24 NOTE — PROGRESS NOTE ADULT - SUBJECTIVE AND OBJECTIVE BOX
Neurology follow up note    SHILO [id number]      Interval History:    Patient feels ok no new complaints.    Allergies    No Known Drug Allergies  latex (Hives)    Intolerances        MEDICATIONS    acetaminophen     Tablet .. 650 milliGRAM(s) Oral every 6 hours PRN  aluminum hydroxide/magnesium hydroxide/simethicone Suspension 30 milliLiter(s) Oral every 4 hours PRN  apixaban 2.5 milliGRAM(s) Oral two times a day  aspirin enteric coated 81 milliGRAM(s) Oral daily  benzonatate 100 milliGRAM(s) Oral every 8 hours PRN  cloNIDine 0.1 milliGRAM(s) Oral two times a day  dextrose 5%. 1000 milliLiter(s) IV Continuous <Continuous>  dextrose 5%. 1000 milliLiter(s) IV Continuous <Continuous>  dextrose 50% Injectable 25 Gram(s) IV Push once  dextrose 50% Injectable 12.5 Gram(s) IV Push once  dextrose 50% Injectable 25 Gram(s) IV Push once  dextrose Oral Gel 15 Gram(s) Oral once PRN  diltiazem    Tablet 60 milliGRAM(s) Oral three times a day  dronabinol 2.5 milliGRAM(s) Oral two times a day before meals  glucagon  Injectable 1 milliGRAM(s) IntraMuscular once  imipramine 50 milliGRAM(s) Oral daily  insulin lispro (ADMELOG) corrective regimen sliding scale   SubCutaneous three times a day before meals  metoprolol tartrate 100 milliGRAM(s) Oral two times a day  mirtazapine 7.5 milliGRAM(s) Oral at bedtime  multivitamin 1 Tablet(s) Oral daily  ondansetron Injectable 4 milliGRAM(s) IV Push every 8 hours PRN  pantoprazole   Suspension 40 milliGRAM(s) Oral before breakfast  senna 2 Tablet(s) Oral at bedtime              Vital Signs Last 24 Hrs  T(C): 36.8 (24 May 2023 04:01), Max: 37.1 (23 May 2023 20:13)  T(F): 98.3 (24 May 2023 04:01), Max: 98.8 (23 May 2023 20:13)  HR: 79 (24 May 2023 04:01) (70 - 92)  BP: 183/76 (24 May 2023 04:01) (137/52 - 183/76)  BP(mean): --  RR: 16 (24 May 2023 04:01) (16 - 18)  SpO2: 98% (24 May 2023 04:01) (92% - 100%)    Parameters below as of 24 May 2023 04:01  Patient On (Oxygen Delivery Method): nasal cannula  O2 Flow (L/min): 2      REVIEW OF SYSTEMS:  Constitutional:  The patient denies fever, chills or night sweats.  Head:  No headache.  Eyes:  No double vision or blurry vision.  Ears:  No ringing in the ears.  Neck:  No neck pain.  Cardiovascular:  No chest pain.  Respiratory:  Slight shortness of breath.  Abdomen:  No nausea, vomiting or abdominal pain.  Extremities/Neurological:  No numbness and tingling.  Musculoskeletal:  Occasional joint pain.    PHYSICAL EXAMINATION:   HEENT:  Head:  Normocephalic, atraumatic.  Eyes:  No scleral icterus.  Ears:  Hearing decreased bilaterally.  ABDOMEN:  Soft, nontender.  EXTREMITIES:  No clubbing or cyanosis were noted.      NEUROLOGIC:  The patient is awake alert  Location was hospital, year was 2023, Upon conversing with the patient, she did seem forgetful, was able to name number of fingers in each eye.  Speech was fluent.  Smile symmetric, was able to follow complex commands, took right hand and touch left ear.  Motor:  Bilateral upper 4/5, bilateral lower 3-/5.  Sensory:  Bilateral upper and lower intact to light touch.  able to follow complex commands                    LABS:  CBC Full  -  ( 23 May 2023 10:50 )  WBC Count : 12.01 K/uL  RBC Count : 3.61 M/uL  Hemoglobin : 10.9 g/dL  Hematocrit : 35.2 %  Platelet Count - Automated : 459 K/uL  Mean Cell Volume : 97.5 fl  Mean Cell Hemoglobin : 30.2 pg  Mean Cell Hemoglobin Concentration : 31.0 gm/dL  Auto Neutrophil # : x  Auto Lymphocyte # : x  Auto Monocyte # : x  Auto Eosinophil # : x  Auto Basophil # : x  Auto Neutrophil % : x  Auto Lymphocyte % : x  Auto Monocyte % : x  Auto Eosinophil % : x  Auto Basophil % : x      05-23    134<L>  |  101  |  40<H>  ----------------------------<  262<H>  4.4   |  30  |  6.30<H>    Ca    9.6      23 May 2023 10:50      Hemoglobin A1C:       Vitamin B12         RADIOLOGY      ANALYSIS AND PLAN:  This is a 72-year-old with episode of altered mental status in regards to lethargy.  1.For episode of altered mental status in regards to lethargy, I suspect most likely metabolic encephalopathy secondary to underlying respiratory issues.  I would recommend to check carbon dioxide levels as needed  2.For history of hypertension, monitor systolic blood pressure.  3.For history of hyperlipidemia, continue the patient on her statin.  4.For history of diabetes, strict control of blood sugars.  5.For mild cognitive impairment supportive treatment   6.For history of neuropathy, continue the patient on her gabapentin.  7 fall precautions     Greater than 33 minutes of time was spent with the patient, plan of care, reviewing data, with greater than 50% of the visit was spent counseling and/or coordinating care with multidisciplinary healthcare team     Neurology follow up note    SHILO [id number]      Interval History:    Patient feels ok no new complaints seen with AID having breakfast     Allergies    No Known Drug Allergies  latex (Hives)    Intolerances        MEDICATIONS    acetaminophen     Tablet .. 650 milliGRAM(s) Oral every 6 hours PRN  aluminum hydroxide/magnesium hydroxide/simethicone Suspension 30 milliLiter(s) Oral every 4 hours PRN  apixaban 2.5 milliGRAM(s) Oral two times a day  aspirin enteric coated 81 milliGRAM(s) Oral daily  benzonatate 100 milliGRAM(s) Oral every 8 hours PRN  cloNIDine 0.1 milliGRAM(s) Oral two times a day  dextrose 5%. 1000 milliLiter(s) IV Continuous <Continuous>  dextrose 5%. 1000 milliLiter(s) IV Continuous <Continuous>  dextrose 50% Injectable 25 Gram(s) IV Push once  dextrose 50% Injectable 12.5 Gram(s) IV Push once  dextrose 50% Injectable 25 Gram(s) IV Push once  dextrose Oral Gel 15 Gram(s) Oral once PRN  diltiazem    Tablet 60 milliGRAM(s) Oral three times a day  dronabinol 2.5 milliGRAM(s) Oral two times a day before meals  glucagon  Injectable 1 milliGRAM(s) IntraMuscular once  imipramine 50 milliGRAM(s) Oral daily  insulin lispro (ADMELOG) corrective regimen sliding scale   SubCutaneous three times a day before meals  metoprolol tartrate 100 milliGRAM(s) Oral two times a day  mirtazapine 7.5 milliGRAM(s) Oral at bedtime  multivitamin 1 Tablet(s) Oral daily  ondansetron Injectable 4 milliGRAM(s) IV Push every 8 hours PRN  pantoprazole   Suspension 40 milliGRAM(s) Oral before breakfast  senna 2 Tablet(s) Oral at bedtime              Vital Signs Last 24 Hrs  T(C): 36.8 (24 May 2023 04:01), Max: 37.1 (23 May 2023 20:13)  T(F): 98.3 (24 May 2023 04:01), Max: 98.8 (23 May 2023 20:13)  HR: 79 (24 May 2023 04:01) (70 - 92)  BP: 183/76 (24 May 2023 04:01) (137/52 - 183/76)  BP(mean): --  RR: 16 (24 May 2023 04:01) (16 - 18)  SpO2: 98% (24 May 2023 04:01) (92% - 100%)    Parameters below as of 24 May 2023 04:01  Patient On (Oxygen Delivery Method): nasal cannula  O2 Flow (L/min): 2      REVIEW OF SYSTEMS:  Constitutional:  The patient denies fever, chills or night sweats.  Head:  No headache.  Eyes:  No double vision or blurry vision.  Ears:  No ringing in the ears.  Neck:  No neck pain.  Cardiovascular:  No chest pain.  Respiratory:  Slight shortness of breath.  Abdomen:  No nausea, vomiting or abdominal pain.  Extremities/Neurological:  No numbness and tingling.  Musculoskeletal:  Occasional joint pain.    PHYSICAL EXAMINATION:   HEENT:  Head:  Normocephalic, atraumatic.  Eyes:  No scleral icterus.  Ears:  Hearing decreased bilaterally.  ABDOMEN:  Soft, nontender.  EXTREMITIES:  No clubbing or cyanosis were noted.      NEUROLOGIC:  The patient is awake alert  Location was hospital, year was 2023, Upon conversing with the patient, she did seem forgetful, was able to name number of fingers in each eye.  Speech was fluent.  Smile symmetric, was able to follow complex commands, took right hand and touch left ear.  Motor:  Bilateral upper 4/5, bilateral lower 3-/5.  Sensory:  Bilateral upper and lower intact to light touch.  able to follow complex commands                    LABS:  CBC Full  -  ( 23 May 2023 10:50 )  WBC Count : 12.01 K/uL  RBC Count : 3.61 M/uL  Hemoglobin : 10.9 g/dL  Hematocrit : 35.2 %  Platelet Count - Automated : 459 K/uL  Mean Cell Volume : 97.5 fl  Mean Cell Hemoglobin : 30.2 pg  Mean Cell Hemoglobin Concentration : 31.0 gm/dL  Auto Neutrophil # : x  Auto Lymphocyte # : x  Auto Monocyte # : x  Auto Eosinophil # : x  Auto Basophil # : x  Auto Neutrophil % : x  Auto Lymphocyte % : x  Auto Monocyte % : x  Auto Eosinophil % : x  Auto Basophil % : x      05-23    134<L>  |  101  |  40<H>  ----------------------------<  262<H>  4.4   |  30  |  6.30<H>    Ca    9.6      23 May 2023 10:50      Hemoglobin A1C:       Vitamin B12         RADIOLOGY      ANALYSIS AND PLAN:  This is a 72-year-old with episode of altered mental status in regards to lethargy.  1.For episode of altered mental status in regards to lethargy, I suspect most likely metabolic encephalopathy secondary to underlying respiratory issues.  I would recommend to check carbon dioxide levels as needed  2.For history of hypertension, monitor systolic blood pressure.  3.For history of hyperlipidemia, continue the patient on her statin.  4.For history of diabetes, strict control of blood sugars.  5.For mild cognitive impairment supportive treatment   6.For history of neuropathy, continue the patient on her gabapentin.  7 fall precautions   8 monitor renal function as needed     Greater than 33 minutes of time was spent with the patient, plan of care, reviewing data, with greater than 50% of the visit was spent counseling and/or coordinating care with multidisciplinary healthcare team

## 2023-05-24 NOTE — PROGRESS NOTE ADULT - PROBLEM SELECTOR PLAN 3
Admit to monitored unit for cardiac monitoring, obtain echo to evaluate LVEF, intravenous diuresis prn as per card consult , monitor ins/outs, monitor renal profile and electrolytes closely ,send 3 sets of enzymes, O2 supply, serial chest xrays, monitor weights and oral intake of fluids, nutritionist consult
symptomatic therapy ,antitussives
symptomatic therapy ,antitussives
Admit to monitored unit for cardiac monitoring, obtain echo to evaluate LVEF, intravenous diuresis prn as per card consult , monitor ins/outs, monitor renal profile and electrolytes closely ,send 3 sets of enzymes, O2 supply, serial chest xrays, monitor weights and oral intake of fluids, nutritionist consult

## 2023-05-24 NOTE — PROGRESS NOTE ADULT - PROBLEM SELECTOR PLAN 10
Admit for iv hydration,monitor renal profile and urine output,nutritionist consult,prealbumin level,serial bmp,nutritional supplements,multivitamins,palliative care evaluuation regarding MOLST completion and artificial nutrition discussion
Gastrointestinal stress ulcer prophylaxis and DVT prophylaxis administered
continue home medications
Gastrointestinal stress ulcer prophylaxis and DVT prophylaxis administered
continue home medications

## 2023-05-24 NOTE — PROGRESS NOTE ADULT - PROBLEM SELECTOR PROBLEM 10
Prophylactic measure
Prophylactic measure
FTT (failure to thrive) in adult
MDD (major depressive disorder)

## 2023-05-24 NOTE — PROGRESS NOTE ADULT - PROBLEM SELECTOR PLAN 7
HD as per nephrologist
continue home medications
HD as per nephrologist
continue home medications
HD as per nephrologist

## 2023-06-07 ENCOUNTER — TRANSCRIPTION ENCOUNTER (OUTPATIENT)
Age: 73
End: 2023-06-07

## 2023-06-14 ENCOUNTER — TRANSCRIPTION ENCOUNTER (OUTPATIENT)
Age: 73
End: 2023-06-14

## 2023-06-17 LAB
CULTURE RESULTS: SIGNIFICANT CHANGE UP
SPECIMEN SOURCE: SIGNIFICANT CHANGE UP

## 2023-06-21 ENCOUNTER — TRANSCRIPTION ENCOUNTER (OUTPATIENT)
Age: 73
End: 2023-06-21

## 2023-07-01 ENCOUNTER — INPATIENT (INPATIENT)
Facility: HOSPITAL | Age: 73
LOS: 15 days | Discharge: NURSING, REHAB OR LONG-TERM CARE | DRG: 535 | End: 2023-07-17
Attending: INTERNAL MEDICINE | Admitting: INTERNAL MEDICINE
Payer: MEDICARE

## 2023-07-01 VITALS
DIASTOLIC BLOOD PRESSURE: 71 MMHG | SYSTOLIC BLOOD PRESSURE: 135 MMHG | OXYGEN SATURATION: 96 % | WEIGHT: 119.93 LBS | HEIGHT: 69 IN | RESPIRATION RATE: 18 BRPM | HEART RATE: 66 BPM | TEMPERATURE: 98 F

## 2023-07-01 DIAGNOSIS — S32.599A OTHER SPECIFIED FRACTURE OF UNSPECIFIED PUBIS, INITIAL ENCOUNTER FOR CLOSED FRACTURE: ICD-10-CM

## 2023-07-01 DIAGNOSIS — Z89.411 ACQUIRED ABSENCE OF RIGHT GREAT TOE: Chronic | ICD-10-CM

## 2023-07-01 DIAGNOSIS — S32.409A UNSPECIFIED FRACTURE OF UNSPECIFIED ACETABULUM, INITIAL ENCOUNTER FOR CLOSED FRACTURE: ICD-10-CM

## 2023-07-01 DIAGNOSIS — S32.401A UNSPECIFIED FRACTURE OF RIGHT ACETABULUM, INITIAL ENCOUNTER FOR CLOSED FRACTURE: ICD-10-CM
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
## 2023-07-01 DIAGNOSIS — I10 ESSENTIAL (PRIMARY) HYPERTENSION: ICD-10-CM

## 2023-07-01 DIAGNOSIS — N18.6 END STAGE RENAL DISEASE: ICD-10-CM

## 2023-07-01 DIAGNOSIS — Z29.9 ENCOUNTER FOR PROPHYLACTIC MEASURES, UNSPECIFIED: ICD-10-CM

## 2023-07-01 DIAGNOSIS — S32.9XXA FRACTURE OF UNSPECIFIED PARTS OF LUMBOSACRAL SPINE AND PELVIS, INITIAL ENCOUNTER FOR CLOSED FRACTURE: ICD-10-CM

## 2023-07-01 DIAGNOSIS — S72.009A FRACTURE OF UNSPECIFIED PART OF NECK OF UNSPECIFIED FEMUR, INITIAL ENCOUNTER FOR CLOSED FRACTURE: ICD-10-CM

## 2023-07-01 LAB
ALBUMIN SERPL ELPH-MCNC: 3.1 G/DL — LOW (ref 3.3–5)
ALP SERPL-CCNC: 136 U/L — HIGH (ref 40–120)
ALT FLD-CCNC: 28 U/L — SIGNIFICANT CHANGE UP (ref 12–78)
ANION GAP SERPL CALC-SCNC: 8 MMOL/L — SIGNIFICANT CHANGE UP (ref 5–17)
APTT BLD: 31.8 SEC — SIGNIFICANT CHANGE UP (ref 27.5–35.5)
AST SERPL-CCNC: 25 U/L — SIGNIFICANT CHANGE UP (ref 15–37)
BASOPHILS # BLD AUTO: 0 K/UL — SIGNIFICANT CHANGE UP (ref 0–0.2)
BASOPHILS NFR BLD AUTO: 0 % — SIGNIFICANT CHANGE UP (ref 0–2)
BILIRUB SERPL-MCNC: 0.4 MG/DL — SIGNIFICANT CHANGE UP (ref 0.2–1.2)
BUN SERPL-MCNC: 47 MG/DL — HIGH (ref 7–23)
CALCIUM SERPL-MCNC: 9.7 MG/DL — SIGNIFICANT CHANGE UP (ref 8.5–10.1)
CHLORIDE SERPL-SCNC: 98 MMOL/L — SIGNIFICANT CHANGE UP (ref 96–108)
CO2 SERPL-SCNC: 29 MMOL/L — SIGNIFICANT CHANGE UP (ref 22–31)
CREAT SERPL-MCNC: 5 MG/DL — HIGH (ref 0.5–1.3)
EGFR: 9 ML/MIN/1.73M2 — LOW
EOSINOPHIL # BLD AUTO: 0.16 K/UL — SIGNIFICANT CHANGE UP (ref 0–0.5)
EOSINOPHIL NFR BLD AUTO: 1 % — SIGNIFICANT CHANGE UP (ref 0–6)
GLUCOSE SERPL-MCNC: 155 MG/DL — HIGH (ref 70–99)
HCT VFR BLD CALC: 40.8 % — SIGNIFICANT CHANGE UP (ref 34.5–45)
HGB BLD-MCNC: 13.1 G/DL — SIGNIFICANT CHANGE UP (ref 11.5–15.5)
INR BLD: 1.27 RATIO — HIGH (ref 0.88–1.16)
LYMPHOCYTES # BLD AUTO: 1.25 K/UL — SIGNIFICANT CHANGE UP (ref 1–3.3)
LYMPHOCYTES # BLD AUTO: 8 % — LOW (ref 13–44)
MCHC RBC-ENTMCNC: 30.9 PG — SIGNIFICANT CHANGE UP (ref 27–34)
MCHC RBC-ENTMCNC: 32.1 GM/DL — SIGNIFICANT CHANGE UP (ref 32–36)
MCV RBC AUTO: 96.2 FL — SIGNIFICANT CHANGE UP (ref 80–100)
MONOCYTES # BLD AUTO: 1.87 K/UL — HIGH (ref 0–0.9)
MONOCYTES NFR BLD AUTO: 12 % — SIGNIFICANT CHANGE UP (ref 2–14)
NEUTROPHILS # BLD AUTO: 12.3 K/UL — HIGH (ref 1.8–7.4)
NEUTROPHILS NFR BLD AUTO: 79 % — HIGH (ref 43–77)
NRBC # BLD: SIGNIFICANT CHANGE UP /100 WBCS (ref 0–0)
PLATELET # BLD AUTO: 281 K/UL — SIGNIFICANT CHANGE UP (ref 150–400)
POTASSIUM SERPL-MCNC: 5.1 MMOL/L — SIGNIFICANT CHANGE UP (ref 3.5–5.3)
POTASSIUM SERPL-SCNC: 5.1 MMOL/L — SIGNIFICANT CHANGE UP (ref 3.5–5.3)
PROT SERPL-MCNC: 7.7 G/DL — SIGNIFICANT CHANGE UP (ref 6–8.3)
PROTHROM AB SERPL-ACNC: 14.9 SEC — HIGH (ref 10.5–13.4)
RBC # BLD: 4.24 M/UL — SIGNIFICANT CHANGE UP (ref 3.8–5.2)
RBC # FLD: 16.5 % — HIGH (ref 10.3–14.5)
SODIUM SERPL-SCNC: 135 MMOL/L — SIGNIFICANT CHANGE UP (ref 135–145)
WBC # BLD: 15.57 K/UL — HIGH (ref 3.8–10.5)
WBC # FLD AUTO: 15.57 K/UL — HIGH (ref 3.8–10.5)

## 2023-07-01 PROCEDURE — 93010 ELECTROCARDIOGRAM REPORT: CPT

## 2023-07-01 PROCEDURE — 71045 X-RAY EXAM CHEST 1 VIEW: CPT | Mod: 26

## 2023-07-01 PROCEDURE — 70450 CT HEAD/BRAIN W/O DYE: CPT | Mod: 26,MA

## 2023-07-01 PROCEDURE — 76376 3D RENDER W/INTRP POSTPROCES: CPT | Mod: 26

## 2023-07-01 PROCEDURE — 99223 1ST HOSP IP/OBS HIGH 75: CPT

## 2023-07-01 PROCEDURE — 99285 EMERGENCY DEPT VISIT HI MDM: CPT

## 2023-07-01 PROCEDURE — 72125 CT NECK SPINE W/O DYE: CPT | Mod: 26,MA

## 2023-07-01 PROCEDURE — 72192 CT PELVIS W/O DYE: CPT | Mod: 26,MA

## 2023-07-01 PROCEDURE — 73502 X-RAY EXAM HIP UNI 2-3 VIEWS: CPT | Mod: 26,RT

## 2023-07-01 PROCEDURE — 73552 X-RAY EXAM OF FEMUR 2/>: CPT | Mod: 26,RT

## 2023-07-01 PROCEDURE — 71250 CT THORAX DX C-: CPT | Mod: 26,MA

## 2023-07-01 RX ORDER — SODIUM CHLORIDE 9 MG/ML
1000 INJECTION, SOLUTION INTRAVENOUS
Refills: 0 | Status: DISCONTINUED | OUTPATIENT
Start: 2023-07-01 | End: 2023-07-17

## 2023-07-01 RX ORDER — GLUCAGON INJECTION, SOLUTION 0.5 MG/.1ML
1 INJECTION, SOLUTION SUBCUTANEOUS ONCE
Refills: 0 | Status: DISCONTINUED | OUTPATIENT
Start: 2023-07-01 | End: 2023-07-17

## 2023-07-01 RX ORDER — DEXTROSE 50 % IN WATER 50 %
25 SYRINGE (ML) INTRAVENOUS ONCE
Refills: 0 | Status: DISCONTINUED | OUTPATIENT
Start: 2023-07-01 | End: 2023-07-17

## 2023-07-01 RX ORDER — PANTOPRAZOLE SODIUM 20 MG/1
40 TABLET, DELAYED RELEASE ORAL
Refills: 0 | Status: DISCONTINUED | OUTPATIENT
Start: 2023-07-01 | End: 2023-07-17

## 2023-07-01 RX ORDER — LIDOCAINE 4 G/100G
1 CREAM TOPICAL DAILY
Refills: 0 | Status: DISCONTINUED | OUTPATIENT
Start: 2023-07-02 | End: 2023-07-17

## 2023-07-01 RX ORDER — TRAMADOL HYDROCHLORIDE 50 MG/1
25 TABLET ORAL EVERY 6 HOURS
Refills: 0 | Status: DISCONTINUED | OUTPATIENT
Start: 2023-07-01 | End: 2023-07-01

## 2023-07-01 RX ORDER — ACETAMINOPHEN 500 MG
650 TABLET ORAL ONCE
Refills: 0 | Status: COMPLETED | OUTPATIENT
Start: 2023-07-01 | End: 2023-07-01

## 2023-07-01 RX ORDER — ASPIRIN/CALCIUM CARB/MAGNESIUM 324 MG
81 TABLET ORAL DAILY
Refills: 0 | Status: DISCONTINUED | OUTPATIENT
Start: 2023-07-01 | End: 2023-07-12

## 2023-07-01 RX ORDER — APIXABAN 2.5 MG/1
2.5 TABLET, FILM COATED ORAL
Refills: 0 | Status: DISCONTINUED | OUTPATIENT
Start: 2023-07-01 | End: 2023-07-01

## 2023-07-01 RX ORDER — POLYETHYLENE GLYCOL 3350 17 G/17G
17 POWDER, FOR SOLUTION ORAL DAILY
Refills: 0 | Status: DISCONTINUED | OUTPATIENT
Start: 2023-07-01 | End: 2023-07-17

## 2023-07-01 RX ORDER — MIRTAZAPINE 45 MG/1
7.5 TABLET, ORALLY DISINTEGRATING ORAL AT BEDTIME
Refills: 0 | Status: DISCONTINUED | OUTPATIENT
Start: 2023-07-01 | End: 2023-07-17

## 2023-07-01 RX ORDER — DEXTROSE 50 % IN WATER 50 %
15 SYRINGE (ML) INTRAVENOUS ONCE
Refills: 0 | Status: DISCONTINUED | OUTPATIENT
Start: 2023-07-01 | End: 2023-07-17

## 2023-07-01 RX ORDER — APIXABAN 2.5 MG/1
2.5 TABLET, FILM COATED ORAL
Refills: 0 | Status: DISCONTINUED | OUTPATIENT
Start: 2023-07-01 | End: 2023-07-02

## 2023-07-01 RX ORDER — TRAMADOL HYDROCHLORIDE 50 MG/1
50 TABLET ORAL EVERY 8 HOURS
Refills: 0 | Status: DISCONTINUED | OUTPATIENT
Start: 2023-07-01 | End: 2023-07-06

## 2023-07-01 RX ORDER — SENNA PLUS 8.6 MG/1
2 TABLET ORAL AT BEDTIME
Refills: 0 | Status: DISCONTINUED | OUTPATIENT
Start: 2023-07-01 | End: 2023-07-17

## 2023-07-01 RX ORDER — ACETAMINOPHEN 500 MG
650 TABLET ORAL EVERY 6 HOURS
Refills: 0 | Status: DISCONTINUED | OUTPATIENT
Start: 2023-07-01 | End: 2023-07-03

## 2023-07-01 RX ORDER — DILTIAZEM HCL 120 MG
60 CAPSULE, EXT RELEASE 24 HR ORAL THREE TIMES A DAY
Refills: 0 | Status: DISCONTINUED | OUTPATIENT
Start: 2023-07-01 | End: 2023-07-17

## 2023-07-01 RX ORDER — MORPHINE SULFATE 50 MG/1
2 CAPSULE, EXTENDED RELEASE ORAL EVERY 6 HOURS
Refills: 0 | Status: DISCONTINUED | OUTPATIENT
Start: 2023-07-01 | End: 2023-07-02

## 2023-07-01 RX ORDER — SODIUM CHLORIDE 9 MG/ML
1000 INJECTION, SOLUTION INTRAVENOUS
Refills: 0 | Status: DISCONTINUED | OUTPATIENT
Start: 2023-07-01 | End: 2023-07-02

## 2023-07-01 RX ORDER — METOPROLOL TARTRATE 50 MG
100 TABLET ORAL
Refills: 0 | Status: DISCONTINUED | OUTPATIENT
Start: 2023-07-01 | End: 2023-07-13

## 2023-07-01 RX ORDER — INSULIN LISPRO 100/ML
VIAL (ML) SUBCUTANEOUS
Refills: 0 | Status: DISCONTINUED | OUTPATIENT
Start: 2023-07-01 | End: 2023-07-02

## 2023-07-01 RX ORDER — DRONABINOL 2.5 MG
2.5 CAPSULE ORAL
Refills: 0 | Status: DISCONTINUED | OUTPATIENT
Start: 2023-07-01 | End: 2023-07-08

## 2023-07-01 RX ORDER — DEXTROSE 50 % IN WATER 50 %
12.5 SYRINGE (ML) INTRAVENOUS ONCE
Refills: 0 | Status: DISCONTINUED | OUTPATIENT
Start: 2023-07-01 | End: 2023-07-17

## 2023-07-01 RX ADMIN — SENNA PLUS 2 TABLET(S): 8.6 TABLET ORAL at 22:52

## 2023-07-01 RX ADMIN — Medication 650 MILLIGRAM(S): at 09:15

## 2023-07-01 RX ADMIN — Medication 100 MILLIGRAM(S): at 20:03

## 2023-07-01 RX ADMIN — Medication 60 MILLIGRAM(S): at 22:52

## 2023-07-01 RX ADMIN — Medication 0.1 MILLIGRAM(S): at 20:03

## 2023-07-01 RX ADMIN — Medication 650 MILLIGRAM(S): at 08:57

## 2023-07-01 RX ADMIN — TRAMADOL HYDROCHLORIDE 25 MILLIGRAM(S): 50 TABLET ORAL at 20:52

## 2023-07-01 RX ADMIN — TRAMADOL HYDROCHLORIDE 25 MILLIGRAM(S): 50 TABLET ORAL at 13:54

## 2023-07-01 RX ADMIN — Medication 650 MILLIGRAM(S): at 20:03

## 2023-07-01 RX ADMIN — Medication 650 MILLIGRAM(S): at 20:48

## 2023-07-01 RX ADMIN — TRAMADOL HYDROCHLORIDE 25 MILLIGRAM(S): 50 TABLET ORAL at 14:35

## 2023-07-01 RX ADMIN — MIRTAZAPINE 7.5 MILLIGRAM(S): 45 TABLET, ORALLY DISINTEGRATING ORAL at 22:52

## 2023-07-01 RX ADMIN — Medication 2.5 MILLIGRAM(S): at 20:03

## 2023-07-01 RX ADMIN — Medication 60 MILLIGRAM(S): at 14:58

## 2023-07-01 NOTE — ED PROVIDER NOTE - PATIENT PORTAL LINK FT
You can access the FollowMyHealth Patient Portal offered by [facility] by registering at the following website: [email or web address]. By joining [facility]’s FollowMyHealth portal, you will also be able to view your health information using other applications (apps) compatible with our system.

## 2023-07-01 NOTE — CONSULT NOTE ADULT - ASSESSMENT
fx rt pelvis with hematoma- hold eliquis  ashd - s/p cabg  s/p mi  s/p stent  esrd - on hd  dm2  hypertension  paf  depression  discussed with spouse 7/1

## 2023-07-01 NOTE — CONSULT NOTE ADULT - ASSESSMENT
Patient is a 72F who presents to [place] ED for R hip pain following fall yesterday with CT scan revealing comminuted R acetabular fx with superior and inferior pubic rami. Given her medical comorbidities and low ambulatory baseline, nonoperative treatment with non-weightbearing status is indicated. She will be admitted to medicine for bleeding observation with H/H checks and rehab placement. She can continue Eliquis at this time and follow up with Dr. Narayanan in office in 2 weeks.

## 2023-07-01 NOTE — CONSULT NOTE ADULT - ASSESSMENT
. 7/1/2023 72-year-old female former smoker quit 3/2012  with significant past medical history for hypertension, CAD sp cabg  PVD  diabetes, dementia, end-stage renal disease on hemodialysis   a fib on eliquis sp recent hosp stay 5/17-5/24/2023  admitted 3/7-3/11/2023 and before that 2/22-2/25/2023   . During 5/2023 admission she had   . ENTERORHINOVIRUS INFECTION RESP TRACT 5/17  . PLEURAL EFFUSION SP l THORA 5/18 TRANSUDATE     . Patient now  presented to the ED 7/1/2023 status post unwitnessed fall from [facility].  Patient states that she heard some voices then rolled out of bed.  Patient complaining of right hip pain.  Unknown head trauma.  + Eliquis     She was found to have acetabular fracture which Ortho was contacted and they plan non operative management Pt was admitted to [facility] for bleed as she was on apixaban and was noted to have pl effsn    . 7/1/2023  I was asked to admit pt                      (01 Jul 2023 13:32)      esrd on hd tues thurs sat     ANEMIA PLAN:  Anemia of chronic disease:  Well controlled by Aranesp  H and H subtherapeutic .  We will continue Aranesp aiming for a HCT of 32-36 %.   We will monitor Iron stores, B12 and RBC folate .      BP monitoring,continue current antihypertensive meds, low salt diet,followup with PMD in 1-2 weeks      Accuchecks monitoring and insulin sliding scale coverage, no concentrated sweets diet, serial labs and f/up with PMD, monitor HB A 1 C every 3-4 mnth

## 2023-07-01 NOTE — ED PROVIDER NOTE - NSFOLLOWUPINSTRUCTIONS_ED_ALL_ED_FT
CT scan of head and neck identified no new evidence of intracranial bleed, fracture.    CT scan of the chest indicates a moderate left pleural effusion decreased from previous exam on a past CT scan for which patient required a thoracentesis.    Please continue outpatient follow-up for pleural effusion return to the ED with any increased work of breathing, shortness of breath.

## 2023-07-01 NOTE — ED ADULT NURSE NOTE - NSFALLHARMRISKINTERV_ED_ALL_ED
Assistance OOB with selected safe patient handling equipment if applicable/Assistance with ambulation/Communicate risk of Fall with Harm to all staff, patient, and family/Monitor gait and stability/Provide visual cue: red socks, yellow wristband, yellow gown, etc/Reinforce activity limits and safety measures with patient and family/Toileting schedule using arm’s reach rule for commode and bathroom/Bed in lowest position, wheels locked, appropriate side rails in place/Call bell, personal items and telephone in reach/Instruct patient to call for assistance before getting out of bed/chair/stretcher/Non-slip footwear applied when patient is off stretcher/[place] to call system/Physically safe environment - no spills, clutter or unnecessary equipment/Purposeful Proactive Rounding/Room/bathroom lighting operational, light cord in reach

## 2023-07-01 NOTE — ED ADULT NURSE NOTE - CHIEF COMPLAINT QUOTE
Pt sent from [facility] for unwitnessed fall out of bed. Pt thinks she hit head and c/o pelvis pain.

## 2023-07-01 NOTE — CONSULT NOTE ADULT - SUBJECTIVE AND OBJECTIVE BOX
Patient is a 72y Female whom presented to the hospital with esrd on hd     PAST MEDICAL & SURGICAL HISTORY:  HTN (hypertension)      h/o Anxiety attack      Depression      h/o Myocardial infarct 2007      h/o Hepatitis A 1969  currently resolved, no symptoms      Murmur, cardiac      h/o Smoking  quitted 3/2012      Anemia secondary to renal failure      ESRD on dialysis      Falls      Ataxia      Type 2 diabetes mellitus      Peripheral vascular disease, unspecified      CAD (coronary artery disease)      Diabetes      coronary stent 2007      s/p Ovarian cyst removal      s/p surgical removal of benign Skin lesion epigastric area      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 650 milliGRAM(s) Oral every 6 hours  aspirin enteric coated 81 milliGRAM(s) Oral daily  cloNIDine 0.1 milliGRAM(s) Oral two times a day  dextrose 5%. 1000 milliLiter(s) (50 mL/Hr) IV Continuous <Continuous>  dextrose 5%. 1000 milliLiter(s) (100 mL/Hr) IV Continuous <Continuous>  dextrose 50% Injectable 25 Gram(s) IV Push once  dextrose 50% Injectable 12.5 Gram(s) IV Push once  dextrose 50% Injectable 25 Gram(s) IV Push once  diltiazem    Tablet 60 milliGRAM(s) Oral three times a day  dronabinol 2.5 milliGRAM(s) Oral two times a day before meals  glucagon  Injectable 1 milliGRAM(s) IntraMuscular once  imipramine 50 milliGRAM(s) Oral daily  insulin lispro (ADMELOG) corrective regimen sliding scale   SubCutaneous three times a day before meals  metoprolol tartrate 100 milliGRAM(s) Oral two times a day  mirtazapine 7.5 milliGRAM(s) Oral at bedtime  multivitamin 1 Tablet(s) Oral daily  pantoprazole    Tablet 40 milliGRAM(s) Oral before breakfast  senna 2 Tablet(s) Oral at bedtime  sodium chloride 0.45%. 1000 milliLiter(s) (50 mL/Hr) IV Continuous <Continuous>      Allergies    No Known Drug Allergies  latex (Hives)    Intolerances        SOCIAL HISTORY:  Denies ETOh,Smoking,     FAMILY HISTORY:  FH: myocardial infarction (Father)    FH: myocardial infarction (Father)    Family history of type 2 diabetes mellitus in brother (Sibling)        REVIEW OF SYSTEMS:    CONSTITUTIONAL: No weakness, fevers or chills  RESPIRATORY: No cough, wheezing, hemoptysis; No shortness of breath  CARDIOVASCULAR: No chest pain or palpitations  GASTROINTESTINAL: No abdominal or epigastric pain. No nausea, vomiting,     No diarrhea or constipation. No melena   SKIN: dry      VITAL:  T(C): , Max: 36.4 (07-01-23 @ 06:57)  T(F): , Max: 97.6 (07-01-23 @ 06:57)  HR: 66 (07-01-23 @ 06:57)  BP: 135/71 (07-01-23 @ 06:57)  BP(mean): --  RR: 18 (07-01-23 @ 06:57)  SpO2: 96% (07-01-23 @ 06:57)  Wt(kg): --    I and O's:    Height (cm): 175.3 (07-01 @ 06:57)  Weight (kg): 54.4 (07-01 @ 06:57)  BMI (kg/m2): 17.7 (07-01 @ 06:57)  BSA (m2): 1.66 (07-01 @ 06:57)    PHYSICAL EXAM:    Constitutional: NAD  HEENT: conjunctive   clear   Neck:  No JVD  Respiratory: CTAB  Cardiovascular: S1 and S2  Gastrointestinal: BS+, soft, NT/ND  Extremities: No peripheral edema  Neurological:  no focal deficits  Psychiatric: Normal mood, normal affect  : No Renteria  Skin: No rashes  Access: pos fistula     LABS:                        13.1   15.57 )-----------( 281      ( 01 Jul 2023 10:00 )             40.8     07-01    135  |  98  |  47<H>  ----------------------------<  155<H>  5.1   |  29  |  5.00<H>    Ca    9.7      01 Jul 2023 10:00    TPro  7.7  /  Alb  3.1<L>  /  TBili  0.4  /  DBili  x   /  AST  25  /  ALT  28  /  AlkPhos  136<H>  07-01      Urine Studies:  Urinalysis Basic - ( 01 Jul 2023 10:00 )    Color: x / Appearance: x / SG: x / pH: x  Gluc: 155 mg/dL / Ketone: x  / Bili: x / Urobili: x   Blood: x / Protein: x / Nitrite: x   Leuk Esterase: x / RBC: x / WBC x   Sq Epi: x / Non Sq Epi: x / Bacteria: x            RADIOLOGY & ADDITIONAL STUDIES:

## 2023-07-01 NOTE — ED PROVIDER NOTE - CARE PLAN
Principal Discharge DX:	Head contusion  Secondary Diagnosis:	Pleural effusion   1 Principal Discharge DX:	Acetabular fracture  Secondary Diagnosis:	Pleural effusion

## 2023-07-01 NOTE — CONSULT NOTE ADULT - SUBJECTIVE AND OBJECTIVE BOX
CARDIOLOGY CONSULT NOTE    Patient is a 72y Female with a known history of : admitted after fall with fx of pelvis rt and hematoma    HPI:      REVIEW OF SYSTEMS:    CONSTITUTIONAL: No fever, weight loss, or fatigue  EYES: No eye pain, visual disturbances, or discharge  ENMT:  No difficulty hearing, tinnitus, vertigo; No sinus or throat pain  NECK: No pain or stiffness  BREASTS: No pain, masses, or nipple discharge  RESPIRATORY: No cough, wheezing, chills or hemoptysis; No shortness of breath  CARDIOVASCULAR: No chest pain, palpitations, dizziness, or leg swelling  GASTROINTESTINAL: No abdominal or epigastric pain. No nausea, vomiting, or hematemesis; No diarrhea or constipation. No melena or hematochezia.  GENITOURINARY: No dysuria, frequency, hematuria, or incontinence  NEUROLOGICAL: No headaches, memory loss, loss of strength, numbness, or tremors  SKIN: No itching, burning, rashes, or lesions   LYMPH NODES: No enlarged glands  ENDOCRINE: No heat or cold intolerance; No hair loss  MUSCULOSKELETAL: No joint pain or swelling; No muscle, back, or extremity pain  PSYCHIATRIC: No depression, anxiety, mood swings, or difficulty sleeping  HEME/LYMPH: No easy bruising, or bleeding gums  ALLERGY AND IMMUNOLOGIC: No hives or eczema    MEDICATIONS  (STANDING):  acetaminophen     Tablet .. 650 milliGRAM(s) Oral every 6 hours  aspirin enteric coated 81 milliGRAM(s) Oral daily  cloNIDine 0.1 milliGRAM(s) Oral two times a day  dextrose 5%. 1000 milliLiter(s) (50 mL/Hr) IV Continuous <Continuous>  dextrose 5%. 1000 milliLiter(s) (100 mL/Hr) IV Continuous <Continuous>  dextrose 50% Injectable 25 Gram(s) IV Push once  dextrose 50% Injectable 12.5 Gram(s) IV Push once  dextrose 50% Injectable 25 Gram(s) IV Push once  diltiazem    Tablet 60 milliGRAM(s) Oral three times a day  dronabinol 2.5 milliGRAM(s) Oral two times a day before meals  glucagon  Injectable 1 milliGRAM(s) IntraMuscular once  imipramine 50 milliGRAM(s) Oral daily  insulin lispro (ADMELOG) corrective regimen sliding scale   SubCutaneous three times a day before meals  metoprolol tartrate 100 milliGRAM(s) Oral two times a day  mirtazapine 7.5 milliGRAM(s) Oral at bedtime  multivitamin 1 Tablet(s) Oral daily  pantoprazole    Tablet 40 milliGRAM(s) Oral before breakfast  senna 2 Tablet(s) Oral at bedtime    MEDICATIONS  (PRN):  dextrose Oral Gel 15 Gram(s) Oral once PRN Blood Glucose LESS THAN 70 milliGRAM(s)/deciliter      ALLERGIES: No Known Drug Allergies  latex (Hives)      Social History:     FAMILY HISTORY:  FH: myocardial infarction (Father)    FH: myocardial infarction (Father)    Family history of type 2 diabetes mellitus in brother (Sibling)        PAST MEDICAL & SURGICAL HISTORY:  HTN (hypertension)      h/o Anxiety attack      Depression      h/o Myocardial infarct 2007      h/o Hepatitis A 1969  currently resolved, no symptoms      Murmur, cardiac      h/o Smoking  quitted 3/2012      Anemia secondary to renal failure      ESRD on dialysis      Falls      Ataxia      Type 2 diabetes mellitus      Peripheral vascular disease, unspecified      CAD (coronary artery disease)      Diabetes      coronary stent 2007      s/p Ovarian cyst removal      s/p surgical removal of benign Skin lesion epigastric area      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            PHYSICAL EXAMINATION:  -----------------------------  T(C): 36.4 (07-01-23 @ 06:57), Max: 36.4 (07-01-23 @ 06:57)  HR: 66 (07-01-23 @ 06:57) (66 - 66)  BP: 135/71 (07-01-23 @ 06:57) (135/71 - 135/71)  RR: 18 (07-01-23 @ 06:57) (18 - 18)  SpO2: 96% (07-01-23 @ 06:57) (96% - 96%)  Wt(kg): --    Height (cm): 175.3 (07-01 @ 06:57)  Weight (kg): 54.4 (07-01 @ 06:57)  BMI (kg/m2): 17.7 (07-01 @ 06:57)  BSA (m2): 1.66 (07-01 @ 06:57)    Constitutional: well developed, normal appearance, well groomed, well nourished, no deformities and no acute distress.   Eyes: the conjunctiva exhibited no abnormalities and the eyelids demonstrated no xanthelasmas.   HEENT: normal oral mucosa, no oral pallor and no oral cyanosis.   Neck: normal jugular venous A waves present, normal jugular venous V waves present and no jugular venous wilson A waves.   Pulmonary: no respiratory distress, normal respiratory rhythm and effort, no accessory muscle use and lungs were clear to auscultation bilaterally.   Cardiovascular: heart rate and rhythm were normal, normal S1 and S2 and no murmur, gallop, rub, heave or thrill are present.   Abdomen: soft, non-tender, no hepato-splenomegaly and no abdominal mass palpated.   Musculoskeletal: the gait could not be assessed..   Extremities: no clubbing of the fingernails, no localized cyanosis, no petechial hemorrhages and no ischemic changes.   Skin: normal skin color and pigmentation, no rash, no venous stasis, no skin lesions, no skin ulcer and no xanthoma was observed.   Psychiatric: oriented to person, place, and time, the affect was normal, the mood was normal and not feeling anxious.     LABS:   --------  07-01    135  |  98  |  47<H>  ----------------------------<  155<H>  5.1   |  29  |  5.00<H>    Ca    9.7      01 Jul 2023 10:00    TPro  7.7  /  Alb  3.1<L>  /  TBili  0.4  /  DBili  x   /  AST  25  /  ALT  28  /  AlkPhos  136<H>  07-01                         13.1   15.57 )-----------( 281      ( 01 Jul 2023 10:00 )             40.8     PT/INR - ( 01 Jul 2023 10:00 )   PT: 14.9 sec;   INR: 1.27 ratio         PTT - ( 01 Jul 2023 10:00 )  PTT:31.8 sec            RADIOLOGY:  -----------------        ECG:     ECHO

## 2023-07-01 NOTE — ED PROVIDER NOTE - NS_EDPROVIDERDISPOUSERTYPE_ED_A_ED
[facility]  GI Progress Note      Bonita Jarrett Patient Status:  Inpatient    1953 MRN [id number]   Location [facility] ENDOSCOPY LAB SUITES Attending Derek Uriostegui MD   Hosp Day # 0 PCP Mp Gomez MD       The patient came to the GI lab for planned EGD. She is complaining of shortness of breath. Anesthesia evaluated her and feels she is volume overloaded and wishes to cancel the procedure. She will be transferred back to her room. PCP notified. They will address her dyspnea.     Kimberley Haney MD Attending Attestation (For Attendings USE Only)...

## 2023-07-01 NOTE — H&P ADULT - NSVTERISKASSESS_GEN_ALL_CORE FT
Medical Assessment Completed on: 01-Jul-2023 13:33
Medical Assessment Completed on: 01-Jul-2023 13:33

## 2023-07-01 NOTE — ED PROVIDER NOTE - CLINICAL SUMMARY MEDICAL DECISION MAKING FREE TEXT BOX
72-year-old female status post unwitnessed fall with history of dementia CT head neck and pelvis rule out fracture or intracranial pathology.  Pain control.

## 2023-07-01 NOTE — H&P ADULT - MUSCULOSKELETAL
not applicable r leg pain/no joint swelling/no joint erythema/no joint warmth/no calf tenderness/no chest wall tenderness

## 2023-07-01 NOTE — H&P ADULT - TIME BILLING
55minutes spent on this visit, 50% visit time spent in care co-ordination with other attendings and counselling patient ,writing admission orders ( see complete and current orders and order section) ,requesting necessary consults ,informing family about status & plan of care .I have discussed care plan with [facility] wellness/admitting /nursing   department ,outpatient PCP , hospital consultants , ER physician & med staff .

## 2023-07-01 NOTE — H&P ADULT - NSICDXPASTSURGICALHX_GEN_ALL_CORE_FT
PAST SURGICAL HISTORY:  coronary stent 2007     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     s/p Ovarian cyst removal     s/p surgical removal of benign Skin lesion epigastric area     
PAST SURGICAL HISTORY:  coronary stent 2007     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     s/p Ovarian cyst removal     s/p surgical removal of benign Skin lesion epigastric area

## 2023-07-01 NOTE — ED ADULT TRIAGE NOTE - CHIEF COMPLAINT QUOTE
Pt sent from [facility] for unwitnessed fall out of bed. Pt thinks she hit head and c/o pelvis pain.

## 2023-07-01 NOTE — CONSULT NOTE ADULT - ASSESSMENT
Initial evaluation/Pulmonary Critical Care consultation requested on   by Dr     from Dr Landeros   Patient examined chart reviewed    HOSPITAL ADMISSION   PATIENT CAME  FROM (if information available)      REASON FOR VISIT  .. Management of problems listed below      NOTEWORTHY FINDINGS.    REVIEW OF SYMPTOMS   Able to give ROS  Yes     RELIABILITY +/-   CONSTITUTIONAL Weakness Yes    ENDOCRINE  No heat or cold intolerance    ALLERGY No hives  Sore throat No stridor  RESP Shortness of breath YES   NEURO New weakness No   CARDIAC   Palpitations No         PHYSICAL EXAM    HEENT Unremarkable  atraumatic   RESP Fair air entry  Harsh breath sound   CARDIAC S1 S2 No S3     NO JVD    ABDOMEN No hepatosplenomegaly   PEDAL EDEMA present No calf tenderness  NO rash       GENERAL DATA .     GOC.      ICU STAY.   .. none  COVID.   ..      BEST PRACTICE ISSUES.    HOB ELEVATN.   .. Yes  DVT PPLX.   ..      STRESS ULCER PPLX.   ..      INFECTION PPLX.   ..    SP SW AMINAH.    ..        DIET.    ..  7/1/2023 renal restrns   IV fl.  ..      PROCEDURES.  ..   PAST PROCEDURES.    ..     GENERAL DATA .     ALLGY.  ..     latex                     WT.  ..  7/1/2023 54  BMI.  ..    7/1/2023 17       ABGS.    VS/ PO/IO/ VENT/ DRIPS.   7/1/2023 afeb 66 130/70   7/1/2023 ra 96%       CC/DOA.        . 7/1/2023 Pt sent from [facility] for unwitnessed fall out of bed. Pt thinks she hit head and c/o pelvis pain.       .  PRESENTATION.   . 7/1/2023 72-year-old female former smoker quit 3/2012  with significant past medical history for hypertension, CAD sp cabg  PVD  diabetes, dementia, end-stage renal disease on hemodialysis   a fib on eliquis sp recent hosp stay 5/17-5/24/2023  admitted 3/7-3/11/2023 and before that 2/22-2/25/2023   . During 5/2023 admission she had   . ENTERORHINOVIRUS INFECTION RESP TRACT 5/17  . PLEURAL EFFUSION SP l THORA 5/18 TRANSUDATE     . Patient now  presented to the ED 7/1/2023 status post unwitnessed fall from [facility].  Patient states that she heard some voices then rolled out of bed.  Patient complaining of right hip pain.  Unknown head trauma.  + Eliquis     She was found to have acetabular fracture which Ortho was contacted and they plan non operative management Pt was admitted to [facility] for bleed as she was on apixaban and was noted to have pl effsn    . I was asked to admit pt                        .     PMH.   CAD.  .. HISTORY ho cabg  A fib  .. 3/7/2023 Not on ac sec multiple falls  PAD  .. sp R-> L bifem - fem BK pop bypass   .. Fem pop bypass 6/21/2022   .. Partial ray amputation r great toe 6/22/2022   ESRD   .. On HD   HOME MEDS.   . cardizem 60.3 metoprolol 100.2 dronabinol 2.5 x 2 apixaba 2.5 x 2 imipramine 50 clonidine .1 x 2 protonix 40 insulin     PROBLEMS/ASSESSMENT/RECOMMENDATIONS (A/R).  PULMONARY.  . GAS EXCHANGE.  .. A/R.   .. Monitor pulse ox and target po 90-95%    . PLEURAL EFFSN.  .. RELEVANT PAST HISTORU  .. 5/18/2023 l thorac 1.5 l   .. 5/18 pl fl l 13 m 73 p 5 afb sm (-) g 131 l 102/268 (.38) ph 7.6 pr 2.6/6.4 (.4)  .. Fluid transudate  .. WORKUP   .. CXR 7/1/2023 cxr Mod large l pl effs or lower zone airspace consolidation/atelecatsis   .. CT ch 7/1/2023 Ct ch   .... Decreased mod l effs since 5/17/2023     ID.  .. WORKUP.  .. W 7/1/2023 W 15  .. A/R   .. Leukocytosis likely sec to stress of hip fx  .. 7/1/2023 Checlk blood and urine cs     CARDIO.  . CAD.  .. 7/1/2023 ASA 81   .. A/R  .. cont rx    . A fib.  .. 7/1/2023 CARDIZEM 60.3   .. AR  .. Cont rx    HEMAT.  .. WORKUP.  .. Hb 7/1/2023 Hb 13   .. INR 7/1/2023    .. A/R  .. As pt was on apixaba will monitorserially     RENAL.  . ESRD.  .. WORKUP  .. NA 7/1/2023    .. K 7/1/2023 K 5.1   .. CR 7/1/2023 CR 5    ORTHO.  . FALL 7/1/2023.  .. CT head C sp 7/1/2023 CT HC (-)     . FRACTURE ACETABULUM r INFERIOR PUBIC RAMUS r 7/1/2023   .. IMAGING.  .. XR Pelvis and r hip 7/1/2023 XR Fractures r acetabulum and inf r pubic ramus   .. 7/1/2023 ct pelvix   .... comminuted r acetabular fx involving r sup and inf pubic rami medial wallacetabulum sup acetabulum and post wall acetabulum   .... small hematoma r pelvic sidewall   .. ORTHO.  .. 7/1/2023 DW Dr Dias She spoke to Ortho Dr Jenkins group and plan is for nonsurgical management  .. A/R  .. Monitor serial Hb ro bleed       ROBLEMS.  . PLEURAL EFFSN  . ESRD  . HIP FRACTURE POA 7/1/2023   .....       PLAN SYNOPSIS.  .  HIP Fx poa 7/1/2023   .... Ortho Dr Jenkins grouprecommends nonsurgical management   .  . PT ON APIXABA ON ADMISSION 7/1/2023   .... Hold apixaba Monitor Hb serially    . Pleural effsn  .... will follow to determine need for thorac       DISCHARGE  PLANNING.  . ESRD patient Will need HD       TIME SPENT   About 55 minutes aggregate care time spent on encounter; activities included   direct patient care, counseling and/or coordinating care reviewing notes, lab data/ imaging , discussion with multidisciplinary team/ patient  /family and explaining in detail risks, benefits, alternatives  of the recommendations     JOSE F LAO 72 f7/1/2023 1948 DR HARRY ALBRIGHT

## 2023-07-01 NOTE — H&P ADULT - NSICDXFAMILYHX_GEN_ALL_CORE_FT
FAMILY HISTORY:  Father  Still living? No  FH: myocardial infarction, Age at diagnosis: Age Unknown  FH: myocardial infarction, Age at diagnosis: Age Unknown    Sibling  Still living? No  Family history of type 2 diabetes mellitus in brother, Age at diagnosis: Age Unknown    
FAMILY HISTORY:  Father  Still living? No  FH: myocardial infarction, Age at diagnosis: Age Unknown  FH: myocardial infarction, Age at diagnosis: Age Unknown    Sibling  Still living? No  Family history of type 2 diabetes mellitus in brother, Age at diagnosis: Age Unknown

## 2023-07-01 NOTE — ED PROVIDER NOTE - OBJECTIVE STATEMENT
72-year-old female with significant past medical history for hypertension, diabetes, dementia, end-stage renal disease on hemodialysis presents to the ED status post unwitnessed fall from [facility].  Patient states that she heard some voices then rolled out of bed.  Patient complaining of right hip pain.  Unknown head trauma.  No blood thinners. 72-year-old female with significant past medical history for hypertension, diabetes, dementia, end-stage renal disease on hemodialysis presents to the ED status post unwitnessed fall from [facility].  Patient states that she heard some voices then rolled out of bed.  Patient complaining of right hip pain.  Unknown head trauma.  + Eliquis

## 2023-07-01 NOTE — ED ADULT NURSE NOTE - NS PRO AD PATIENT TYPE
DNR/DNI in chart/Health Care Proxy (HCP)/Do Not Resuscitate (DNR)/Medical Orders for Life-Sustaining Treatment (MOLST)

## 2023-07-01 NOTE — H&P ADULT - HIV OFFER
Unable to answer due to medical condition/unresponsive/etc...
Previously Declined (within the last year)

## 2023-07-01 NOTE — H&P ADULT - NSHPLABSRESULTS_GEN_ALL_CORE
< from: Xray Femur 2 Views, Right (07.01.23 @ 11:09) >    INTERPRETATION:  Pelvis AP and right hip 2 views and right femur 2 views    CLINICAL HISTORY: Status post fall, right acetabular fracture    COMPARISON: CT imaging of the same day    FINDINGS: The bone is demineralized. Right medial wall acetabular   fracture is seen and is described on CT imaging. Fracture of the right   inferior pubic ramus is apparent    The remaining bones are intact with no additional fractures are seen.    Vascular calcifications are present.    IMPRESSION: Fractures including the medial wall of the acetabulum and   inferior right pubic ramus    < end of copied text >    < from: Xray Chest 1 View- PORTABLE-Urgent (Xray Chest 1 View- PORTABLE-Urgent .) (07.01.23 @ 11:08) >      TECHNIQUE:  Portable  AP chest radiograph.    COMPARISON: 5/18/2023 chest x-ray .    FINDINGS:  CATHETERS AND TUBES: None    PULMONARY: Moderate to large LEFT pleural effusion and/or lower zone   airspace consolidation obscuring diaphragm contour. RIGHT lung parenchyma   clear.  No pneumothorax.    HEART/VASCULAR: The heart is mildly enlarged in transverse diameter.  .    BONES: Visualized osseous thorax intact.    IMPRESSION:   Moderate-large LEFT effusion and/or lower zone airspace   consolidation/atelectasis..    < end of copied text >      FINDINGS  Left Ventricle: Left ventricle was of normal size, moderate LV systolic   dysfunction EF 45%  Aortic Valve: Aortic sclerosis  Mitral Valve: Moderate mitral regurgitation  Tricuspid Valve: Trace tricuspid regurgitation  Pulmonic Valve: Normal  Left Atrium: Normal  Right Ventricle: Normal  Right Atrium: Normal  Diastolic Function: Normal  Pericardium/Pleura: Normal      IMPRESSION:  Left ventricle was of normal size, moderate LV systolic dysfunction EF 45%  Aortic sclerosis  Moderate mitral regurgitation  Trace tricuspid regurgitation    < end of copied text >

## 2023-07-01 NOTE — H&P ADULT - HISTORY OF PRESENT ILLNESS
72-year-old female with significant past medical history for hypertension, diabetes, dementia, end-stage renal disease on hemodialysis presents to the ED status post unwitnessed fall from [facility].  Patient states that she heard some voices then rolled out of bed.  Patient complaining of right hip pain.  Unknown head trauma.  + Eliquis < from: Xray Femur showed  Fractures including the medial wall of the acetabulum and inferior right pubic ramus Admitted for pain management ,PT/OT       
  . 7/1/2023 72-year-old female former smoker quit 3/2012  with significant past medical history for hypertension, CAD sp cabg  PVD  diabetes, dementia, end-stage renal disease on hemodialysis   a fib on eliquis sp recent hosp stay 5/17-5/24/2023  admitted 3/7-3/11/2023 and before that 2/22-2/25/2023   . During 5/2023 admission she had   . ENTERORHINOVIRUS INFECTION RESP TRACT 5/17  . PLEURAL EFFUSION SP l THORA 5/18 TRANSUDATE     . Patient now  presented to the ED 7/1/2023 status post unwitnessed fall from [facility].  Patient states that she heard some voices then rolled out of bed.  Patient complaining of right hip pain.  Unknown head trauma.  + Eliquis     She was found to have acetabular fracture which Ortho was contacted and they plan non operative management Pt was admitted to [facility] for bleed as she was on apixaban and was noted to have pl effsn    . 7/1/2023  I was asked to admit pt

## 2023-07-01 NOTE — H&P ADULT - ASSESSMENT
72-year-old female with significant past medical history for hypertension, diabetes, dementia, end-stage renal disease on hemodialysis presents to the ED status post unwitnessed fall from [facility].  Patient states that she heard some voices then rolled out of bed.  Patient complaining of right hip pain.  Unknown head trauma.  + Eliquis < from: Xray Femur showed  Fractures including the medial wall of the acetabulum and inferior right pubic ramus Admitted for pain management ,PT/OT

## 2023-07-01 NOTE — ED ADULT NURSE NOTE - OBJECTIVE STATEMENT
Pt presents with R rear hip pain sp fall out of bed. PT states she hit her head, denies head or neck pain. No deformities on head examination. Moves head and neck freely wo pain. Denies dizziness or changes in vision. Neuro intact, PERRLA, not motor deficit to UE or LE BL. Pt has PMH of DMII, blood sugar 123 on admission, received dialysis 3x per week, on ELIQUIS daily

## 2023-07-01 NOTE — H&P ADULT - EYES
Call Rapid Response after talking to Dr. Adam Robles, respirations continued to increase, patient stating \"I'm going to pass away\". Staff and physicians responded to room. Dr. Arnold Bales assessed patient, see new orders. ABGs drawn at 1903.
PERRL/EOMI/conjunctiva clear/normal

## 2023-07-01 NOTE — CONSULT NOTE ADULT - SUBJECTIVE AND OBJECTIVE BOX
Patient is a 72F who presents to [place] ED for right hip pain after a fall yesterday. She is a poor historian. To note, she is on Eliquis. Ambulates with walker at baseline. States that she had an unwitnessed fall yesterday and has been experiencing pain in her right hip that does not radiate. Unclear if she hit her head or had LOC. No other injuries noted. denies numbness or tingling otherwise. Upon arrival to the ED, she was found to have a right acetabular fracture on CT scan.     RLE  General: she has an abrasion on her R knee    Motor: did not test        Sensory  L2 L3 L4 L5 S1  2   2   2   2  2    LLE  Motor:  L2 L3 L4 L5 S1  4/5 4/5 4/5 4/5 4/5    Sensory:  L2 L3 L4 L5 S1  2   2   2   2  2      CT Bony Pelvis 7/1/23  IMPRESSION:  1.  Comminuted right acetabular fracture involving the right superior and  inferior pubic rami, medial wall the acetabulum, superior aspect of the  acetabulum, and posterior wall the acetabulum.  2.  Small hematoma along the right pelvic sidewall from adjacent acetabular fracture.  3.  Degenerative changes of the lower lumbar spine and pelvis. Patient is a 72F who presents to [place] ED for right hip pain after a fall yesterday. She is a poor historian with a medical history of CAD, CKD on HD, and takes Eliquis daily. Ambulates with walker at baseline. States that she had an unwitnessed fall yesterday and has been experiencing pain in her right hip that does not radiate. Unclear if she hit her head or had LOC. No other injuries noted. Denies numbness or tingling down the extremities. Denies any orthopedic surgical history. No other orthopedic complaints at this time.      RLE  General: she has an abrasion on her R knee    Motor: did not test        Sensory  L2 L3 L4 L5 S1  2   2   2   2  2    LLE  Motor:  L2 L3 L4 L5 S1  4/5 4/5 4/5 4/5 4/5    Sensory:  L2 L3 L4 L5 S1  2   2   2   2  2      CT Bony Pelvis 7/1/23  IMPRESSION:  1.  Comminuted right acetabular fracture involving the right superior and  inferior pubic rami, medial wall the acetabulum, superior aspect of the  acetabulum, and posterior wall the acetabulum.  2.  Small hematoma along the right pelvic sidewall from adjacent acetabular fracture.  3.  Degenerative changes of the lower lumbar spine and pelvis. Patient is a 72F who presents to [place] ED for right hip pain after a fall yesterday. She is a poor historian with a medical history of CAD, CKD on HD, and takes Eliquis daily. Ambulates with walker at baseline. States that she had an unwitnessed fall yesterday and has been experiencing pain in her right hip that does not radiate. Unclear if she hit her head or had LOC. No other injuries noted. Denies numbness or tingling down the extremities. Denies any orthopedic surgical history. No other orthopedic complaints at this time.    Vital Signs (24 Hrs):  T(C): 36.4 (07-01-23 @ 06:57), Max: 36.4 (07-01-23 @ 06:57)  HR: 66 (07-01-23 @ 06:57) (66 - 66)  BP: 135/71 (07-01-23 @ 06:57) (135/71 - 135/71)  RR: 18 (07-01-23 @ 06:57) (18 - 18)  SpO2: 96% (07-01-23 @ 06:57) (96% - 96%)  Wt(kg): --    LABS:                          13.1   15.57 )-----------( 281      ( 01 Jul 2023 10:00 )             40.8     07-01    135  |  98  |  47<H>  ----------------------------<  155<H>  5.1   |  29  |  5.00<H>    Ca    9.7      01 Jul 2023 10:00    TPro  7.7  /  Alb  3.1<L>  /  TBili  0.4  /  DBili  x   /  AST  25  /  ALT  28  /  AlkPhos  136<H>  07-01    LIVER FUNCTIONS - ( 01 Jul 2023 10:00 )  Alb: 3.1 g/dL / Pro: 7.7 g/dL / ALK PHOS: 136 U/L / ALT: 28 U/L / AST: 25 U/L / GGT: x           PT/INR - ( 01 Jul 2023 10:00 )   PT: 14.9 sec;   INR: 1.27 ratio         PTT - ( 01 Jul 2023 10:00 )  PTT:31.8 sec    Exam:  General: NAD    RLE:   Superficial abrasion on her R knee  No open skin  TTP around R groin/hip, nowhere else along RLE  Hip ROM limited due to pain  Motor: +Gsc/TA/EHL/FHL  SILT: +SPN/DPN/Tiffanie/Saph/Tib  2+ DP pulse  Compartments soft and compressible  Calves non-tender    Secondary Assessment:  NC/AT, NTTP of clavicles, NTTP of C-,T-,L-Spine,  UEs: NTTP of Shoulders, Elbows, Wrists, Hands; NT with AROM/PROM of Shoulders, Elbows, Wrists, Hands; AIN/PIN/Med/Uln/Msc/Rad/Ax intact  LLE: Able to SLR, NT with Log Roll, NT with Heel Strike, NTTP of Hip, Knee, Ankle, foot; NT with AROM/PROM of Hip, Knee, Ankle, Foot; Q/H/Gsc/TA/EHL/FHL intact    Imaging:  CT Bony Pelvis 7/1/23:  1.  Comminuted right acetabular fracture involving the right superior and  inferior pubic rami, medial wall the acetabulum, superior aspect of the  acetabulum, and posterior wall the acetabulum.  2.  Small hematoma along the right pelvic sidewall from adjacent acetabular fracture.  3.  Degenerative changes of the lower lumbar spine and pelvis.    A/p:    Patient is a 72F who presents to for R hip pain following fall yesterday with CT scan revealing comminuted R acetabular fx with superior and inferior pubic rami.    -Given her medical comorbidities and low ambulatory baseline, nonoperative treatment with non-weightbearing of the RLE is recommended  -NWB of RLE, can ambulate with walker otherwise  -PT/OT daily while admitted  -Can continue taking Eliquis from the orthopedic standpoint unless otherwise medically contraindicated   -Recommend regular H/H checks due to high bleeding risk of pelvic fracture and Eliquis   -Pain control as needed  -Medical management per primary team  -Dispo pending PT eval  -Follow up with Dr. Narayanan in office in 2 weeks whether she is discharged to rehab or home  -Discussed above with Dr. Narayanan and Dr. Jenkins, who both agree with plan  Patient is a 72F who presents to [place] ED for right hip pain after a fall yesterday. She is a poor historian with a medical history of CAD, CKD on HD, and takes Eliquis daily. Ambulates with walker at baseline. States that she had an unwitnessed fall yesterday and has been experiencing pain in her right hip that does not radiate. Unclear if she hit her head or had LOC. No other injuries noted. Denies numbness or tingling down the extremities. Denies any orthopedic surgical history. No other orthopedic complaints at this time.    Vital Signs (24 Hrs):  T(C): 36.4 (07-01-23 @ 06:57), Max: 36.4 (07-01-23 @ 06:57)  HR: 66 (07-01-23 @ 06:57) (66 - 66)  BP: 135/71 (07-01-23 @ 06:57) (135/71 - 135/71)  RR: 18 (07-01-23 @ 06:57) (18 - 18)  SpO2: 96% (07-01-23 @ 06:57) (96% - 96%)  Wt(kg): --    LABS:                          13.1   15.57 )-----------( 281      ( 01 Jul 2023 10:00 )             40.8     07-01    135  |  98  |  47<H>  ----------------------------<  155<H>  5.1   |  29  |  5.00<H>    Ca    9.7      01 Jul 2023 10:00    TPro  7.7  /  Alb  3.1<L>  /  TBili  0.4  /  DBili  x   /  AST  25  /  ALT  28  /  AlkPhos  136<H>  07-01    LIVER FUNCTIONS - ( 01 Jul 2023 10:00 )  Alb: 3.1 g/dL / Pro: 7.7 g/dL / ALK PHOS: 136 U/L / ALT: 28 U/L / AST: 25 U/L / GGT: x           PT/INR - ( 01 Jul 2023 10:00 )   PT: 14.9 sec;   INR: 1.27 ratio         PTT - ( 01 Jul 2023 10:00 )  PTT:31.8 sec    Exam:  General: NAD    RLE:   Superficial abrasion on her R knee  No open skin  TTP around R groin/hip, nowhere else along RLE  Hip ROM limited due to pain  Motor: +Gsc/TA; pt had partial amp of big toe can't asses EHL/FHL; pt able to wiggle remaining toes   SILT: +SPN/DPN/Tiffanie/Saph/Tib  2+ DP pulse  Compartments soft and compressible  Calves non-tender    Secondary Assessment:  NC/AT, NTTP of clavicles, NTTP of C-,T-,L-Spine,  UEs: NTTP of Shoulders, Elbows, Wrists, Hands; NT with AROM/PROM of Shoulders, Elbows, Wrists, Hands; AIN/PIN/Med/Uln/Msc/Rad/Ax intact  LLE: Able to SLR, NT with Log Roll, NT with Heel Strike, NTTP of Hip, Knee, Ankle, foot; NT with AROM/PROM of Hip, Knee, Ankle, Foot; Q/H/Gsc/TA/EHL/FHL intact    Imaging:  CT Bony Pelvis 7/1/23:  1.  Comminuted right acetabular fracture involving the right superior and  inferior pubic rami, medial wall the acetabulum, superior aspect of the  acetabulum, and posterior wall the acetabulum.  2.  Small hematoma along the right pelvic sidewall from adjacent acetabular fracture.  3.  Degenerative changes of the lower lumbar spine and pelvis.    A/p:    Patient is a 72F who presents to for R hip pain following fall yesterday with CT scan revealing comminuted R acetabular fx with superior and inferior pubic rami.    -Given her medical comorbidities and low ambulatory baseline, nonoperative treatment with non-weightbearing of the RLE is recommended  -NWB of RLE, can ambulate with walker otherwise  -PT/OT daily while admitted  -Can continue taking Eliquis from the orthopedic standpoint unless otherwise medically contraindicated   -Recommend regular H/H checks due to high bleeding risk of pelvic fracture and Eliquis   -Pain control as needed  -Medical management per primary team  -Dispo pending PT eval  -Follow up with Dr. Narayanan in office in 2 weeks whether she is discharged to rehab or home  -Discussed above with Dr. Narayanan and Dr. Jenkins, who both agree with plan

## 2023-07-01 NOTE — CONSULT NOTE ADULT - SUBJECTIVE AND OBJECTIVE BOX
CHIEF COMPLAINT/ REASON FOR VISIT  .. Patient was seen to address the  issue listed under PROBLEM LIST which is located toward bottom of this note     SHILO KOHLER    PL[facility] APER 01    Allergies    No Known Drug Allergies  latex (Hives)    Intolerances        PAST MEDICAL & SURGICAL HISTORY:  HTN (hypertension)      h/o Anxiety attack      Depression      h/o Myocardial infarct 2007      h/o Hepatitis A 1969  currently resolved, no symptoms      Murmur, cardiac      h/o Smoking  quitted 3/2012      Anemia secondary to renal failure      ESRD on dialysis      Falls      Ataxia      Type 2 diabetes mellitus      Peripheral vascular disease, unspecified      CAD (coronary artery disease)      Diabetes      coronary stent 2007      s/p Ovarian cyst removal      s/p surgical removal of benign Skin lesion epigastric area      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          FAMILY HISTORY:  FH: myocardial infarction (Father)    FH: myocardial infarction (Father)    Family history of type 2 diabetes mellitus in brother (Sibling)        Home Medications:  acetaminophen 325 mg oral tablet: 2 tab(s) orally every 6 hours, As needed, Temp greater or equal to 38C (100.4F), Mild Pain (1 - 3) (17 May 2023 14:45)  Admelog SoloStar 100 units/mL injectable solution: injectable 3 times a day (before meals)sliding scale  (17 May 2023 14:45)  apixaban 2.5 mg oral tablet: 1 tab(s) orally 2 times a day (23 May 2023 14:39)  aspirin 81 mg oral delayed release tablet: 1 tab(s) orally once a day (at bedtime) (17 May 2023 14:45)  Basaglar KwikPen 100 units/mL subcutaneous solution: 10 unit(s) subcutaneous once a day (at bedtime) (23 May 2023 14:39)  cloNIDine 0.1 mg oral tablet: 1 tab(s) orally 2 times a day (17 May 2023 14:45)  dilTIAZem 60 mg oral tablet: 1 tab(s) orally 3 times a day (23 May 2023 14:39)  droNABinol 2.5 mg oral capsule: 1 cap(s) orally 2 times a day (before meals) (23 May 2023 14:39)  imipramine 50 mg oral tablet: 1 tab(s) orally once a day (17 May 2023 14:45)  metoprolol tartrate 100 mg oral tablet: 1 tab(s) orally 2 times a day (23 May 2023 14:39)  mirtazapine 7.5 mg oral tablet: 1 tab(s) orally once a day (at bedtime) (23 May 2023 14:39)  Mucinex 600 mg oral tablet, extended release: 1 tab(s) orally 2 times a day (23 May 2023 14:39)  Nephro-Alfie oral tablet: 1 tab(s) orally once a day (17 May 2023 14:45)  PANTOPRAZOLE 40MG DR TAB: 1 tab(s) orally once a day (17 May 2023 14:45)  senna leaf extract oral tablet: 2 tab(s) orally once a day (at bedtime) (23 May 2023 14:39)      MEDICATIONS  (STANDING):  acetaminophen     Tablet .. 650 milliGRAM(s) Oral every 6 hours  aspirin enteric coated 81 milliGRAM(s) Oral daily  cloNIDine 0.1 milliGRAM(s) Oral two times a day  dextrose 5%. 1000 milliLiter(s) (50 mL/Hr) IV Continuous <Continuous>  dextrose 5%. 1000 milliLiter(s) (100 mL/Hr) IV Continuous <Continuous>  dextrose 50% Injectable 25 Gram(s) IV Push once  dextrose 50% Injectable 12.5 Gram(s) IV Push once  dextrose 50% Injectable 25 Gram(s) IV Push once  diltiazem    Tablet 60 milliGRAM(s) Oral three times a day  dronabinol 2.5 milliGRAM(s) Oral two times a day before meals  glucagon  Injectable 1 milliGRAM(s) IntraMuscular once  imipramine 50 milliGRAM(s) Oral daily  insulin lispro (ADMELOG) corrective regimen sliding scale   SubCutaneous three times a day before meals  metoprolol tartrate 100 milliGRAM(s) Oral two times a day  mirtazapine 7.5 milliGRAM(s) Oral at bedtime  multivitamin 1 Tablet(s) Oral daily  pantoprazole    Tablet 40 milliGRAM(s) Oral before breakfast  senna 2 Tablet(s) Oral at bedtime  sodium chloride 0.45%. 1000 milliLiter(s) (50 mL/Hr) IV Continuous <Continuous>    MEDICATIONS  (PRN):  dextrose Oral Gel 15 Gram(s) Oral once PRN Blood Glucose LESS THAN 70 milliGRAM(s)/deciliter  traMADol 25 milliGRAM(s) Oral every 6 hours PRN Moderate Pain (4 - 6)      Diet, Renal Restrictions:   For patients receiving Renal Replacement - No Protein Restr, No Conc K, No Conc Phos, Low Sodium (07-01-23 @ 13:16) [Active]          Vital Signs Last 24 Hrs  T(C): 36.4 (01 Jul 2023 06:57), Max: 36.4 (01 Jul 2023 06:57)  T(F): 97.6 (01 Jul 2023 06:57), Max: 97.6 (01 Jul 2023 06:57)  HR: 71 (01 Jul 2023 14:58) (66 - 71)  BP: 158/80 (01 Jul 2023 14:58) (135/71 - 158/80)  BP(mean): --  RR: 18 (01 Jul 2023 14:58) (18 - 18)  SpO2: 96% (01 Jul 2023 14:58) (96% - 96%)    Parameters below as of 01 Jul 2023 14:58  Patient On (Oxygen Delivery Method): room air                  LABS:                        13.1   15.57 )-----------( 281      ( 01 Jul 2023 10:00 )             40.8     07-01    135  |  98  |  47<H>  ----------------------------<  155<H>  5.1   |  29  |  5.00<H>    Ca    9.7      01 Jul 2023 10:00    TPro  7.7  /  Alb  3.1<L>  /  TBili  0.4  /  DBili  x   /  AST  25  /  ALT  28  /  AlkPhos  136<H>  07-01    PT/INR - ( 01 Jul 2023 10:00 )   PT: 14.9 sec;   INR: 1.27 ratio         PTT - ( 01 Jul 2023 10:00 )  PTT:31.8 sec  Urinalysis Basic - ( 01 Jul 2023 10:00 )    Color: x / Appearance: x / SG: x / pH: x  Gluc: 155 mg/dL / Ketone: x  / Bili: x / Urobili: x   Blood: x / Protein: x / Nitrite: x   Leuk Esterase: x / RBC: x / WBC x   Sq Epi: x / Non Sq Epi: x / Bacteria: x            WBC:  WBC Count: 15.57 K/uL (07-01 @ 10:00)      MICROBIOLOGY:  RECENT CULTURES:              PT/INR - ( 01 Jul 2023 10:00 )   PT: 14.9 sec;   INR: 1.27 ratio         PTT - ( 01 Jul 2023 10:00 )  PTT:31.8 sec    Sodium:  Sodium: 135 mmol/L (07-01 @ 10:00)      5.00 mg/dL 07-01 @ 10:00      Hemoglobin:  Hemoglobin: 13.1 g/dL (07-01 @ 10:00)      Platelets: Platelet Count - Automated: 281 K/uL (07-01 @ 10:00)      LIVER FUNCTIONS - ( 01 Jul 2023 10:00 )  Alb: 3.1 g/dL / Pro: 7.7 g/dL / ALK PHOS: 136 U/L / ALT: 28 U/L / AST: 25 U/L / GGT: x             Urinalysis Basic - ( 01 Jul 2023 10:00 )    Color: x / Appearance: x / SG: x / pH: x  Gluc: 155 mg/dL / Ketone: x  / Bili: x / Urobili: x   Blood: x / Protein: x / Nitrite: x   Leuk Esterase: x / RBC: x / WBC x   Sq Epi: x / Non Sq Epi: x / Bacteria: x        RADIOLOGY & ADDITIONAL STUDIES:      MICROBIOLOGY:  RECENT CULTURES:

## 2023-07-01 NOTE — H&P ADULT - NSICDXPASTMEDICALHX_GEN_ALL_CORE_FT
PAST MEDICAL HISTORY:  Anemia secondary to renal failure     Ataxia     CAD (coronary artery disease)     Depression     Diabetes     ESRD on dialysis     Falls     h/o Anxiety attack     h/o Hepatitis A 1969 currently resolved, no symptoms    h/o Myocardial infarct 2007     h/o Smoking quitted 3/2012    HTN (hypertension)     Murmur, cardiac     Peripheral vascular disease, unspecified     Type 2 diabetes mellitus     
PAST MEDICAL HISTORY:  Anemia secondary to renal failure     Ataxia     CAD (coronary artery disease)     Depression     Diabetes     ESRD on dialysis     Falls     h/o Anxiety attack     h/o Hepatitis A 1969 currently resolved, no symptoms    h/o Myocardial infarct 2007     h/o Smoking quitted 3/2012    HTN (hypertension)     Murmur, cardiac     Peripheral vascular disease, unspecified     Type 2 diabetes mellitus

## 2023-07-02 LAB
ANION GAP SERPL CALC-SCNC: 9 MMOL/L — SIGNIFICANT CHANGE UP (ref 5–17)
BUN SERPL-MCNC: 39 MG/DL — HIGH (ref 7–23)
CALCIUM SERPL-MCNC: 9.1 MG/DL — SIGNIFICANT CHANGE UP (ref 8.5–10.1)
CHLORIDE SERPL-SCNC: 97 MMOL/L — SIGNIFICANT CHANGE UP (ref 96–108)
CO2 SERPL-SCNC: 26 MMOL/L — SIGNIFICANT CHANGE UP (ref 22–31)
CREAT SERPL-MCNC: 4.1 MG/DL — HIGH (ref 0.5–1.3)
EGFR: 11 ML/MIN/1.73M2 — LOW
GLUCOSE SERPL-MCNC: 188 MG/DL — HIGH (ref 70–99)
HBV CORE AB SER-ACNC: REACTIVE
HBV SURFACE AB SER-ACNC: 59.9 MIU/ML — SIGNIFICANT CHANGE UP
HBV SURFACE AG SER-ACNC: SIGNIFICANT CHANGE UP
HCT VFR BLD CALC: 37 % — SIGNIFICANT CHANGE UP (ref 34.5–45)
HCV AB S/CO SERPL IA: 0.15 S/CO — SIGNIFICANT CHANGE UP (ref 0–0.99)
HCV AB SERPL-IMP: SIGNIFICANT CHANGE UP
HGB BLD-MCNC: 11.8 G/DL — SIGNIFICANT CHANGE UP (ref 11.5–15.5)
MCHC RBC-ENTMCNC: 31.1 PG — SIGNIFICANT CHANGE UP (ref 27–34)
MCHC RBC-ENTMCNC: 31.9 GM/DL — LOW (ref 32–36)
MCV RBC AUTO: 97.4 FL — SIGNIFICANT CHANGE UP (ref 80–100)
NRBC # BLD: 0 /100 WBCS — SIGNIFICANT CHANGE UP (ref 0–0)
PLATELET # BLD AUTO: 265 K/UL — SIGNIFICANT CHANGE UP (ref 150–400)
POTASSIUM SERPL-MCNC: 5 MMOL/L — SIGNIFICANT CHANGE UP (ref 3.5–5.3)
POTASSIUM SERPL-SCNC: 5 MMOL/L — SIGNIFICANT CHANGE UP (ref 3.5–5.3)
RBC # BLD: 3.8 M/UL — SIGNIFICANT CHANGE UP (ref 3.8–5.2)
RBC # FLD: 16.8 % — HIGH (ref 10.3–14.5)
SODIUM SERPL-SCNC: 132 MMOL/L — LOW (ref 135–145)
WBC # BLD: 14.36 K/UL — HIGH (ref 3.8–10.5)
WBC # FLD AUTO: 14.36 K/UL — HIGH (ref 3.8–10.5)

## 2023-07-02 PROCEDURE — 99233 SBSQ HOSP IP/OBS HIGH 50: CPT

## 2023-07-02 RX ORDER — INSULIN GLARGINE 100 [IU]/ML
10 INJECTION, SOLUTION SUBCUTANEOUS
Qty: 0 | Refills: 0 | DISCHARGE

## 2023-07-02 RX ORDER — INSULIN LISPRO 100/ML
0 VIAL (ML) SUBCUTANEOUS
Qty: 0 | Refills: 0 | DISCHARGE

## 2023-07-02 RX ORDER — DILTIAZEM HCL 120 MG
1 CAPSULE, EXT RELEASE 24 HR ORAL
Refills: 0 | DISCHARGE

## 2023-07-02 RX ORDER — ACETAMINOPHEN 500 MG
2 TABLET ORAL
Refills: 0 | DISCHARGE

## 2023-07-02 RX ORDER — INSULIN LISPRO 100/ML
VIAL (ML) SUBCUTANEOUS EVERY 6 HOURS
Refills: 0 | Status: DISCONTINUED | OUTPATIENT
Start: 2023-07-02 | End: 2023-07-03

## 2023-07-02 RX ORDER — PANTOPRAZOLE SODIUM 20 MG/1
1 TABLET, DELAYED RELEASE ORAL
Qty: 0 | Refills: 0 | DISCHARGE

## 2023-07-02 RX ORDER — INSULIN GLARGINE 100 [IU]/ML
8 INJECTION, SOLUTION SUBCUTANEOUS
Refills: 0 | DISCHARGE

## 2023-07-02 RX ORDER — MIRTAZAPINE 45 MG/1
1 TABLET, ORALLY DISINTEGRATING ORAL
Refills: 0 | DISCHARGE

## 2023-07-02 RX ADMIN — APIXABAN 2.5 MILLIGRAM(S): 2.5 TABLET, FILM COATED ORAL at 05:29

## 2023-07-02 RX ADMIN — Medication 1: at 09:09

## 2023-07-02 RX ADMIN — Medication 2: at 12:15

## 2023-07-02 RX ADMIN — Medication 81 MILLIGRAM(S): at 12:16

## 2023-07-02 RX ADMIN — Medication 2: at 17:31

## 2023-07-02 RX ADMIN — TRAMADOL HYDROCHLORIDE 50 MILLIGRAM(S): 50 TABLET ORAL at 04:05

## 2023-07-02 RX ADMIN — Medication 650 MILLIGRAM(S): at 12:16

## 2023-07-02 RX ADMIN — Medication 2.5 MILLIGRAM(S): at 06:34

## 2023-07-02 RX ADMIN — Medication 60 MILLIGRAM(S): at 06:34

## 2023-07-02 RX ADMIN — Medication 650 MILLIGRAM(S): at 17:31

## 2023-07-02 RX ADMIN — Medication 0.1 MILLIGRAM(S): at 17:31

## 2023-07-02 RX ADMIN — TRAMADOL HYDROCHLORIDE 25 MILLIGRAM(S): 50 TABLET ORAL at 01:14

## 2023-07-02 RX ADMIN — POLYETHYLENE GLYCOL 3350 17 GRAM(S): 17 POWDER, FOR SOLUTION ORAL at 12:18

## 2023-07-02 RX ADMIN — Medication 60 MILLIGRAM(S): at 21:17

## 2023-07-02 RX ADMIN — Medication 2.5 MILLIGRAM(S): at 18:33

## 2023-07-02 RX ADMIN — Medication 100 MILLIGRAM(S): at 17:31

## 2023-07-02 RX ADMIN — LIDOCAINE 1 PATCH: 4 CREAM TOPICAL at 12:18

## 2023-07-02 RX ADMIN — Medication 650 MILLIGRAM(S): at 00:14

## 2023-07-02 RX ADMIN — TRAMADOL HYDROCHLORIDE 50 MILLIGRAM(S): 50 TABLET ORAL at 03:05

## 2023-07-02 RX ADMIN — Medication 60 MILLIGRAM(S): at 12:16

## 2023-07-02 RX ADMIN — MIRTAZAPINE 7.5 MILLIGRAM(S): 45 TABLET, ORALLY DISINTEGRATING ORAL at 21:17

## 2023-07-02 RX ADMIN — Medication 650 MILLIGRAM(S): at 05:29

## 2023-07-02 RX ADMIN — Medication 0.1 MILLIGRAM(S): at 05:29

## 2023-07-02 RX ADMIN — Medication 100 MILLIGRAM(S): at 05:28

## 2023-07-02 RX ADMIN — MORPHINE SULFATE 2 MILLIGRAM(S): 50 CAPSULE, EXTENDED RELEASE ORAL at 22:30

## 2023-07-02 RX ADMIN — Medication 1 TABLET(S): at 12:16

## 2023-07-02 RX ADMIN — Medication 3: at 22:02

## 2023-07-02 RX ADMIN — MORPHINE SULFATE 2 MILLIGRAM(S): 50 CAPSULE, EXTENDED RELEASE ORAL at 21:32

## 2023-07-02 RX ADMIN — Medication 50 MILLIGRAM(S): at 12:16

## 2023-07-02 RX ADMIN — Medication 650 MILLIGRAM(S): at 23:27

## 2023-07-02 RX ADMIN — PANTOPRAZOLE SODIUM 40 MILLIGRAM(S): 20 TABLET, DELAYED RELEASE ORAL at 05:29

## 2023-07-02 RX ADMIN — SENNA PLUS 2 TABLET(S): 8.6 TABLET ORAL at 21:17

## 2023-07-02 NOTE — PROGRESS NOTE ADULT - ASSESSMENT
REASON FOR VISIT  .. Management of problems listed below      NOTEWORTHY FINDINGS.    REVIEW OF SYMPTOMS   Able to give ROS  Yes     RELIABILITY +/-   CONSTITUTIONAL Weakness Yes    ENDOCRINE  No heat or cold intolerance    ALLERGY No hives  Sore throat No stridor  RESP Shortness of breath YES   NEURO New weakness No   CARDIAC   Palpitations No         PHYSICAL EXAM    HEENT Unremarkable  atraumatic   RESP Fair air entry  Harsh breath sound   CARDIAC S1 S2 No S3     NO JVD    ABDOMEN No hepatosplenomegaly   PEDAL EDEMA present No calf tenderness  NO rash       GENERAL DATA .     GOC.      ICU STAY.   .. none  COVID.   ..      BEST PRACTICE ISSUES.    HOB ELEVATN.   .. Yes  DVT PPLX.   ..    7/2/2023 Apixaba 2.5 x2 held for thorac   STRESS ULCER PPLX.   ..      INFECTION PPLX.   ..    SP SW AMINAH.    ..        DIET.    ..  7/1/2023 renal restrns   IV fl.  ..      PROCEDURES.  ..   PAST PROCEDURES.    ..     GENERAL DATA .     ALLGY.  ..     latex                     WT.  ..  7/1/2023 54  BMI.  ..    7/1/2023 17       ABGS.    VS/ PO/IO/ VENT/ DRIPS.   7/2/2023 afeb 72 140/60   7/2/2023 ra 92%    CC/DOA.        . 7/1/2023 Pt sent from [facility] for unwitnessed fall out of bed. Pt thinks she hit head and c/o pelvis pain.       .  PRESENTATION.   . 7/1/2023 72-year-old female former smoker quit 3/2012  with significant past medical history for hypertension, CAD sp cabg  PVD  diabetes, dementia, end-stage renal disease on hemodialysis   a fib on eliquis sp recent hosp stay 5/17-5/24/2023  admitted 3/7-3/11/2023 and before that 2/22-2/25/2023   . During 5/2023 admission she had   . ENTERORHINOVIRUS INFECTION RESP TRACT 5/17  . PLEURAL EFFUSION SP l THORA 5/18 TRANSUDATE     . Patient now  presented to the ED 7/1/2023 status post unwitnessed fall from [facility].  Patient states that she heard some voices then rolled out of bed.  Patient complaining of right hip pain.  Unknown head trauma.  + Maricruz     She was found to have acetabular fracture which Ortho was contacted and they plan non operative management Pt was [facility] to [facility] for bleed as she was on apixaban and was noted to have pl effsn    . I was asked to admit pt                        .     PMH.   CAD.  .. HISTORY ho cabg  A fib  .. 3/7/2023 Not on ac sec multiple falls  PAD  .. sp R-> L bifem - fem BK pop bypass   .. Fem pop bypass 6/21/2022   .. Partial ray amputation r great toe 6/22/2022   ESRD   .. On HD   HOME MEDS.   . cardizem 60.3 metoprolol 100.2 dronabinol 2.5 x 2 apixaba 2.5 x 2 imipramine 50 clonidine .1 x 2 protonix 40 insulin       PROBLEMS/ASSESSMENT/RECOMMENDATIONS (A/R).  PULMONARY.  . GAS EXCHANGE.  .. A/R.   .. Monitor pulse ox and target po 90-95%    . PLEURAL EFFSN.  .. RELEVANT PAST HISTORU  .. 5/18/2023 l thorac 1.5 l   .. 5/18 pl fl l 13 m 73 p 5 afb sm (-) g 131 l 102/268 (.38) ph 7.6 pr 2.6/6.4 (.4)  .. Fluid transudate  .. WORKUP   .. CXR 7/1/2023 cxr Mod large l pl effs or lower zone airspace consolidation/atelecatsis   .. CT ch 7/1/2023 Ct ch   .... Decreased mod l effs since 5/17/2023   .. EVENTS  .. 7/2/2023 DW pt and dw son consensus is that they want fluid remived   .. A/R  ..  7/2/2023 will hold apixab and order thorac     ID.  .. WORKUP.  .. W 7/1-7/2/2023 W 15 - 14   .. A/R   .. Leukocytosis likely sec to stress of hip fx  .. 7/1/2023 Checlk blood and urine cs     CARDIO.  . CAD.  .. 7/1/2023 ASA 81   .. A/R  .. cont rx    . A fib.  .. 7/1/2023 CARDIZEM 60.3   .. AR  .. Cont rx    HEMAT.  .. WORKUP.  .. Hb 7/1-7/2/2023 Hb 13 - 11.8   .. INR 7/1/2023    .. A/R  .. As pt was on apixaba will monitorserially     RENAL.  . ESRD.  .. WORKUP  .. NA 7/1/2023    .. K 7/1/2023 K 5.1   .. CR 7/1/2023 CR 5    ORTHO.  . FALL 7/1/2023.  .. CT head C sp 7/1/2023 CT [facility] (-)     . FRACTURE ACETABULUM r INFERIOR PUBIC RAMUS r 7/1/2023   .. IMAGING.  .. XR Pelvis and r hip 7/1/2023 XR Fractures r acetabulum and inf r pubic ramus   .. 7/1/2023 ct pelvix   .... comminuted r acetabular fx involving r sup and inf pubic rami medial wallacetabulum sup acetabulum and post wall acetabulum   .... small hematoma r pelvic sidewall   .. ORTHO.  .. 7/1/2023 DW Dr Dias She spoke to Ortho Dr Jenkins group and plan is for nonsurgical management  .. A/R  .. Monitor serial Hb ro bleed       ROBLEMS.  . PLEURAL EFFSN  . ESRD  . HIP FRACTURE POA 7/1/2023   .....       PLAN SYNOPSIS.  .  HIP Fx poa 7/1/2023   .... Ortho Dr Jenkins grouprecommends nonsurgical management   .  . PT ON APIXABA ON ADMISSION 7/1/2023   .... Hold apixaba Monitor Hb serially    . Pleural effsn  ....  7/2/2023 thora ordered apixa held     DISCHARGE  PLANNING.  . ESRD patient Will need HD       TIME SPENT   About 36 minutes aggregate care time spent on encounter; activities included   direct patient care, counseling and/or coordinating care reviewing notes, lab data/ imaging , discussion with multidisciplinary team/ patient  /family and explaining in detail risks, benefits, alternatives  of the recommendations     JOSE F LAO 72 f7/1/2023 1948 DR HARRY ALBRIGHT

## 2023-07-02 NOTE — PROGRESS NOTE ADULT - SUBJECTIVE AND OBJECTIVE BOX
Patient seen and examined at bedside. Patient is a 72F with a R superior/inferior rami fracture and R comminuted acetabular fracture confirmed by CT scan, with no acute overnight events. No complaints of pain or other orthopedic complaints.     Vital Signs (24 Hrs):  T(C): 36.8 (07-02-23 @ 04:57), Max: 36.9 (07-02-23 @ 02:05)  HR: 71 (07-02-23 @ 04:57) (62 - 78)  BP: 165/74 (07-02-23 @ 04:57) (96/61 - 174/61)  RR: 16 (07-02-23 @ 04:57) (16 - 18)  SpO2: 92% (07-02-23 @ 04:57) (92% - 98%)  Wt(kg): --    LABS:                          13.1   15.57 )-----------( 281      ( 01 Jul 2023 10:00 )             40.8     07-01    135  |  98  |  47<H>  ----------------------------<  155<H>  5.1   |  29  |  5.00<H>    Ca    9.7      01 Jul 2023 10:00    TPro  7.7  /  Alb  3.1<L>  /  TBili  0.4  /  DBili  x   /  AST  25  /  ALT  28  /  AlkPhos  136<H>  07-01    LIVER FUNCTIONS - ( 01 Jul 2023 10:00 )  Alb: 3.1 g/dL / Pro: 7.7 g/dL / ALK PHOS: 136 U/L / ALT: 28 U/L / AST: 25 U/L / GGT: x           PT/INR - ( 01 Jul 2023 10:00 )   PT: 14.9 sec;   INR: 1.27 ratio         PTT - ( 01 Jul 2023 10:00 )  PTT:31.8 sec    Physical  General: NAD    RLE:   superficial abrasion on right knee  No open skin  TTP a round R hip/groin  Hip ROM limited due to pain  Dorsiflexion and plantar flexion intact  SILT: +SPN/DPN/Tiffanie/Tib  Motor:+Gsc/TA, EHL/FHL unable to be examined given R big toe partial amputation  SILT: +SPN/DPN/Tiffanie/Saph/Tib  2+ DP pulse  Compartments soft and compressible  Calf non-tender    A/P:  Patient is a 72F with a R superior/inferior rami fracture and R comminuted acetabular fracture confirmed by CT scan.    -Given her medical comorbidities and low ambulatory baseline, nonoperative treatment with non-weightbearing of the RLE is recommended  -NWB of RLE  -PT/OT daily  -c/w Eliquis from the orthopedic standpoint unless otherwise medically contraindicated   - H/H checks due to high bleeding risk of pelvic fracture and Eliquis  -Pain control PRN  -Medical management per primary team  -Discussed above with Dr. Narayanan and Dr. Jenkins, who both agree with plan  -Dispo pending PT eval Patient seen and examined at bedside. Patient is a 72F with a R superior/inferior rami fracture and R comminuted acetabular fracture confirmed by CT scan, with no acute overnight events. No complaints of pain or other orthopedic complaints.     Vital Signs (24 Hrs):  T(C): 36.8 (07-02-23 @ 04:57), Max: 36.9 (07-02-23 @ 02:05)  HR: 71 (07-02-23 @ 04:57) (62 - 78)  BP: 165/74 (07-02-23 @ 04:57) (96/61 - 174/61)  RR: 16 (07-02-23 @ 04:57) (16 - 18)  SpO2: 92% (07-02-23 @ 04:57) (92% - 98%)  Wt(kg): --    LABS:                          13.1   15.57 )-----------( 281      ( 01 Jul 2023 10:00 )             40.8     07-01    135  |  98  |  47<H>  ----------------------------<  155<H>  5.1   |  29  |  5.00<H>    Ca    9.7      01 Jul 2023 10:00    TPro  7.7  /  Alb  3.1<L>  /  TBili  0.4  /  DBili  x   /  AST  25  /  ALT  28  /  AlkPhos  136<H>  07-01    LIVER FUNCTIONS - ( 01 Jul 2023 10:00 )  Alb: 3.1 g/dL / Pro: 7.7 g/dL / ALK PHOS: 136 U/L / ALT: 28 U/L / AST: 25 U/L / GGT: x           PT/INR - ( 01 Jul 2023 10:00 )   PT: 14.9 sec;   INR: 1.27 ratio         PTT - ( 01 Jul 2023 10:00 )  PTT:31.8 sec    Physical  General: NAD    RLE:   superficial abrasion on right knee  No open skin  TTP around R hip/groin, nowhere else throughout RLE  Hip ROM limited due to pain  SILT: +SPN/DPN/Tiffanie/Tib  Motor: +Gsc/TA; EHL/FHL unable to be examined given R big toe partial amputation, able to wiggle remaining toes  SILT: +SPN/DPN/Tiffanie/Saph/Tib  2+ DP pulse  Compartments soft and compressible  Calf non-tender    A/P:  Patient is a 72F with a R superior/inferior rami fracture and R comminuted acetabular fracture confirmed by CT scan.    -Given her medical comorbidities and low ambulatory baseline, nonoperative treatment with non-weightbearing of the RLE is recommended  -NWB of RLE  -PT/OT daily  -c/w Eliquis from the orthopedic standpoint unless otherwise medically contraindicated   - H/H checks due to high bleeding risk of pelvic fracture and Eliquis  -Pain control PRN  -Medical management per primary team  -Dispo pending PT eval  -Follow up with Dr. Narayanan in office in 2 weeks whether she is discharged to rehab or home  -Discussed above with Dr. Narayanan and Dr. Jenkins, who both agree with plan

## 2023-07-02 NOTE — PROGRESS NOTE ADULT - SUBJECTIVE AND OBJECTIVE BOX
Patient is a 72y Female whom presented to the hospital with esrd on hd     PAST MEDICAL & SURGICAL HISTORY:  HTN (hypertension)      h/o Anxiety attack      Depression      h/o Myocardial infarct 2007      h/o Hepatitis A 1969  currently resolved, no symptoms      Murmur, cardiac      h/o Smoking  quitted 3/2012      Anemia secondary to renal failure      ESRD on dialysis      Falls      Ataxia      Type 2 diabetes mellitus      Peripheral vascular disease, unspecified      CAD (coronary artery disease)      Diabetes      coronary stent 2007      s/p Ovarian cyst removal      s/p surgical removal of benign Skin lesion epigastric area      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 650 milliGRAM(s) Oral every 6 hours  aspirin enteric coated 81 milliGRAM(s) Oral daily  cloNIDine 0.1 milliGRAM(s) Oral two times a day  dextrose 5%. 1000 milliLiter(s) (50 mL/Hr) IV Continuous <Continuous>  dextrose 5%. 1000 milliLiter(s) (100 mL/Hr) IV Continuous <Continuous>  dextrose 50% Injectable 25 Gram(s) IV Push once  dextrose 50% Injectable 12.5 Gram(s) IV Push once  dextrose 50% Injectable 25 Gram(s) IV Push once  diltiazem    Tablet 60 milliGRAM(s) Oral three times a day  dronabinol 2.5 milliGRAM(s) Oral two times a day before meals  glucagon  Injectable 1 milliGRAM(s) IntraMuscular once  imipramine 50 milliGRAM(s) Oral daily  insulin lispro (ADMELOG) corrective regimen sliding scale   SubCutaneous three times a day before meals  metoprolol tartrate 100 milliGRAM(s) Oral two times a day  mirtazapine 7.5 milliGRAM(s) Oral at bedtime  multivitamin 1 Tablet(s) Oral daily  pantoprazole    Tablet 40 milliGRAM(s) Oral before breakfast  senna 2 Tablet(s) Oral at bedtime  sodium chloride 0.45%. 1000 milliLiter(s) (50 mL/Hr) IV Continuous <Continuous>      Allergies    No Known Drug Allergies  latex (Hives)    Intolerances        SOCIAL HISTORY:  Denies ETOh,Smoking,     FAMILY HISTORY:  FH: myocardial infarction (Father)    FH: myocardial infarction (Father)    Family history of type 2 diabetes mellitus in brother (Sibling)        REVIEW OF SYSTEMS:    CONSTITUTIONAL: No weakness, fevers or chills  RESPIRATORY: No cough, wheezing, hemoptysis; No shortness of breath  CARDIOVASCULAR: No chest pain or palpitations  GASTROINTESTINAL: No abdominal or epigastric pain. No nausea, vomiting,     No diarrhea or constipation. No melena   SKIN: dry                          11.8   14.36 )-----------( 265      ( 02 Jul 2023 09:01 )             37.0       CBC Full  -  ( 02 Jul 2023 09:01 )  WBC Count : 14.36 K/uL  RBC Count : 3.80 M/uL  Hemoglobin : 11.8 g/dL  Hematocrit : 37.0 %  Platelet Count - Automated : 265 K/uL  Mean Cell Volume : 97.4 fl  Mean Cell Hemoglobin : 31.1 pg  Mean Cell Hemoglobin Concentration : 31.9 gm/dL  Auto Neutrophil # : x  Auto Lymphocyte # : x  Auto Monocyte # : x  Auto Eosinophil # : x  Auto Basophil # : x  Auto Neutrophil % : x  Auto Lymphocyte % : x  Auto Monocyte % : x  Auto Eosinophil % : x  Auto Basophil % : x      07-02    132<L>  |  97  |  39<H>  ----------------------------<  188<H>  5.0   |  26  |  4.10<H>    Ca    9.1      02 Jul 2023 09:01    TPro  7.7  /  Alb  3.1<L>  /  TBili  0.4  /  DBili  x   /  AST  25  /  ALT  28  /  AlkPhos  136<H>  07-01      CAPILLARY BLOOD GLUCOSE      POCT Blood Glucose.: 217 mg/dL (02 Jul 2023 12:13)  POCT Blood Glucose.: 178 mg/dL (02 Jul 2023 08:18)  POCT Blood Glucose.: 118 mg/dL (01 Jul 2023 18:51)  POCT Blood Glucose.: 152 mg/dL (01 Jul 2023 15:01)      Vital Signs Last 24 Hrs  T(C): 37.3 (02 Jul 2023 12:06), Max: 37.3 (02 Jul 2023 12:06)  T(F): 99.1 (02 Jul 2023 12:06), Max: 99.1 (02 Jul 2023 12:06)  HR: 78 (02 Jul 2023 12:06) (62 - 78)  BP: 109/69 (02 Jul 2023 12:06) (96/61 - 174/61)  BP(mean): --  RR: 18 (02 Jul 2023 12:06) (16 - 18)  SpO2: 95% (02 Jul 2023 12:06) (92% - 98%)    Parameters below as of 02 Jul 2023 12:06  Patient On (Oxygen Delivery Method): room air        Urinalysis Basic - ( 02 Jul 2023 09:01 )    Color: x / Appearance: x / SG: x / pH: x  Gluc: 188 mg/dL / Ketone: x  / Bili: x / Urobili: x   Blood: x / Protein: x / Nitrite: x   Leuk Esterase: x / RBC: x / WBC x   Sq Epi: x / Non Sq Epi: x / Bacteria: x        PT/INR - ( 01 Jul 2023 10:00 )   PT: 14.9 sec;   INR: 1.27 ratio         PTT - ( 01 Jul 2023 10:00 )  PTT:31.8 sec  PHYSICAL EXAM:    Constitutional: NAD  HEENT: conjunctive   clear   Neck:  No JVD  Respiratory: CTAB  Cardiovascular: S1 and S2  Gastrointestinal: BS+, soft, NT/ND  Extremities: No peripheral edema  Neurological:  no focal deficits  pos fistula

## 2023-07-02 NOTE — PROGRESS NOTE ADULT - SUBJECTIVE AND OBJECTIVE BOX
Patient is a 72y Female with a known history of :  Hip fracture [S72.009A]    Closed pelvic fracture [S32.9XXA]    Pubic ramus fracture [S32.599A]    Right acetabular fracture [S32.401A]    ESRD on dialysis [N18.6]    Prophylactic measure [Z29.9]    HTN (hypertension) [I10]    Fall [W19.XXXA]      HPI:  72-year-old female with significant past medical history for hypertension, diabetes, dementia, end-stage renal disease on hemodialysis presents to the ED status post unwitnessed fall from [facility].  Patient states that she heard some voices then rolled out of bed.  Patient complaining of right hip pain.  Unknown head trauma.  + Eliquis < from: Xray Femur showed  Fractures including the medial wall of the acetabulum and inferior right pubic ramus Admitted for pain management ,PT/OT        (01 Jul 2023 15:24)      REVIEW OF SYSTEMS:    CONSTITUTIONAL: No fever, weight loss, or fatigue  EYES: No eye pain, visual disturbances, or discharge  ENMT:  No difficulty hearing, tinnitus, vertigo; No sinus or throat pain  NECK: No pain or stiffness  BREASTS: No pain, masses, or nipple discharge  RESPIRATORY: No cough, wheezing, chills or hemoptysis; No shortness of breath  CARDIOVASCULAR: No chest pain, palpitations, dizziness, or leg swelling  GASTROINTESTINAL: No abdominal or epigastric pain. No nausea, vomiting, or hematemesis; No diarrhea or constipation. No melena or hematochezia.  GENITOURINARY: No dysuria, frequency, hematuria, or incontinence  NEUROLOGICAL: No headaches, memory loss, loss of strength, numbness, or tremors  SKIN: No itching, burning, rashes, or lesions   LYMPH NODES: No enlarged glands  ENDOCRINE: No heat or cold intolerance; No hair loss  MUSCULOSKELETAL: No joint pain or swelling; No muscle, back, or extremity pain  PSYCHIATRIC: No depression, anxiety, mood swings, or difficulty sleeping  HEME/LYMPH: No easy bruising, or bleeding gums  ALLERGY AND IMMUNOLOGIC: No hives or eczema    MEDICATIONS  (STANDING):  acetaminophen     Tablet .. 650 milliGRAM(s) Oral every 6 hours  apixaban 2.5 milliGRAM(s) Oral two times a day  aspirin enteric coated 81 milliGRAM(s) Oral daily  cloNIDine 0.1 milliGRAM(s) Oral two times a day  dextrose 5%. 1000 milliLiter(s) (100 mL/Hr) IV Continuous <Continuous>  dextrose 5%. 1000 milliLiter(s) (50 mL/Hr) IV Continuous <Continuous>  dextrose 50% Injectable 12.5 Gram(s) IV Push once  dextrose 50% Injectable 25 Gram(s) IV Push once  dextrose 50% Injectable 25 Gram(s) IV Push once  diltiazem    Tablet 60 milliGRAM(s) Oral three times a day  dronabinol 2.5 milliGRAM(s) Oral two times a day before meals  glucagon  Injectable 1 milliGRAM(s) IntraMuscular once  imipramine 50 milliGRAM(s) Oral daily  insulin lispro (ADMELOG) corrective regimen sliding scale   SubCutaneous three times a day before meals  lidocaine   4% Patch 1 Patch Transdermal daily  metoprolol tartrate 100 milliGRAM(s) Oral two times a day  mirtazapine 7.5 milliGRAM(s) Oral at bedtime  multivitamin 1 Tablet(s) Oral daily  pantoprazole    Tablet 40 milliGRAM(s) Oral before breakfast  polyethylene glycol 3350 17 Gram(s) Oral daily  senna 2 Tablet(s) Oral at bedtime    MEDICATIONS  (PRN):  dextrose Oral Gel 15 Gram(s) Oral once PRN Blood Glucose LESS THAN 70 milliGRAM(s)/deciliter  morphine  - Injectable 2 milliGRAM(s) IV Push every 6 hours PRN Severe Pain (7 - 10)  traMADol 25 milliGRAM(s) Oral every 6 hours PRN Moderate Pain (4 - 6)  traMADol 50 milliGRAM(s) Oral every 8 hours PRN Severe Pain (7 - 10)      ALLERGIES: No Known Drug Allergies  latex (Hives)      FAMILY HISTORY:  FH: myocardial infarction (Father)    FH: myocardial infarction (Father)    Family history of type 2 diabetes mellitus in brother (Sibling)        PHYSICAL EXAMINATION:  -----------------------------  T(C): 36.8 (07-02-23 @ 04:57), Max: 36.9 (07-02-23 @ 02:05)  HR: 71 (07-02-23 @ 04:57) (62 - 78)  BP: 165/74 (07-02-23 @ 04:57) (96/61 - 174/61)  RR: 16 (07-02-23 @ 04:57) (16 - 18)  SpO2: 92% (07-02-23 @ 04:57) (92% - 98%)  Wt(kg): --    07-01 @ 07:01  -  07-02 @ 07:00  --------------------------------------------------------  IN:  Total IN: 0 mL    OUT:    Other (mL): 1500 mL    Voided (mL): 400 mL  Total OUT: 1900 mL    Total NET: -1900 mL            VITALS  T(C): 36.8 (07-02-23 @ 04:57), Max: 36.9 (07-02-23 @ 02:05)  HR: 71 (07-02-23 @ 04:57) (62 - 78)  BP: 165/74 (07-02-23 @ 04:57) (96/61 - 174/61)  RR: 16 (07-02-23 @ 04:57) (16 - 18)  SpO2: 92% (07-02-23 @ 04:57) (92% - 98%)    Constitutional: well developed, normal appearance, well groomed, well nourished, no deformities and no acute distress.   Eyes: the conjunctiva exhibited no abnormalities and the eyelids demonstrated no xanthelasmas.   HEENT: normal oral mucosa, no oral pallor and no oral cyanosis.   Neck: normal jugular venous A waves present, normal jugular venous V waves present and no jugular venous wilson A waves.   Pulmonary: no respiratory distress, normal respiratory rhythm and effort, no accessory muscle use and lungs were clear to auscultation bilaterally.   Cardiovascular: heart rate and rhythm were normal, normal S1 and S2 and no murmur, gallop, rub, heave or thrill are present.   Abdomen: soft, non-tender, no hepato-splenomegaly and no abdominal mass palpated.   Musculoskeletal: the gait could not be assessed..   Extremities: no clubbing of the fingernails, no localized cyanosis, no petechial hemorrhages and no ischemic changes.   Skin: normal skin color and pigmentation, no rash, no venous stasis, no skin lesions, no skin ulcer and no xanthoma was observed.   Psychiatric: oriented to person, place, and time, the affect was normal, the mood was normal and not feeling anxious.     LABS:   --------  07-01    135  |  98  |  47<H>  ----------------------------<  155<H>  5.1   |  29  |  5.00<H>    Ca    9.7      01 Jul 2023 10:00    TPro  7.7  /  Alb  3.1<L>  /  TBili  0.4  /  DBili  x   /  AST  25  /  ALT  28  /  AlkPhos  136<H>  07-01                         13.1   15.57 )-----------( 281      ( 01 Jul 2023 10:00 )             40.8     PT/INR - ( 01 Jul 2023 10:00 )   PT: 14.9 sec;   INR: 1.27 ratio         PTT - ( 01 Jul 2023 10:00 )  PTT:31.8 sec            RADIOLOGY:  -----------------    ECG:     ECHO:

## 2023-07-02 NOTE — PROGRESS NOTE ADULT - SUBJECTIVE AND OBJECTIVE BOX
PROGRESS NOTE  Patient is a 72y old  Female who presents with a chief complaint of s/p fall (02 Jul 2023 12:58)    Chart and available morning labs /imaging are reviewed electronically , urgent issues addressed . More information  is being added upon completion of rounds , when more information is collected and management discussed with consultants , medical staff and social service/case management on the floor   OVERNIGHT  No new issues reported by medical staff . All above noted Patient is resting in a bed comfortably .Confused ,poor mentation .No distress noted   Pain is well controlled   HPI:  72-year-old female with significant past medical history for hypertension, diabetes, dementia, end-stage renal disease on hemodialysis presents to the ED status post unwitnessed fall from [facility].  Patient states that she heard some voices then rolled out of bed.  Patient complaining of right hip pain.  Unknown head trauma.  + Eliquis < from: Xray Femur showed  Fractures including the medial wall of the acetabulum and inferior right pubic ramus Admitted for pain management ,PT/OT        (01 Jul 2023 15:24)    PAST MEDICAL & SURGICAL HISTORY:  HTN (hypertension)      h/o Anxiety attack      Depression      h/o Myocardial infarct 2007      h/o Hepatitis A 1969  currently resolved, no symptoms      Murmur, cardiac      h/o Smoking  quitted 3/2012      Anemia secondary to renal failure      ESRD on dialysis      Falls      Ataxia      Type 2 diabetes mellitus      Peripheral vascular disease, unspecified      CAD (coronary artery disease)      Diabetes      coronary stent 2007      s/p Ovarian cyst removal      s/p surgical removal of benign Skin lesion epigastric area      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 650 milliGRAM(s) Oral every 6 hours  apixaban 2.5 milliGRAM(s) Oral two times a day  aspirin enteric coated 81 milliGRAM(s) Oral daily  cloNIDine 0.1 milliGRAM(s) Oral two times a day  dextrose 5%. 1000 milliLiter(s) (50 mL/Hr) IV Continuous <Continuous>  dextrose 5%. 1000 milliLiter(s) (100 mL/Hr) IV Continuous <Continuous>  dextrose 50% Injectable 12.5 Gram(s) IV Push once  dextrose 50% Injectable 25 Gram(s) IV Push once  dextrose 50% Injectable 25 Gram(s) IV Push once  diltiazem    Tablet 60 milliGRAM(s) Oral three times a day  dronabinol 2.5 milliGRAM(s) Oral two times a day before meals  glucagon  Injectable 1 milliGRAM(s) IntraMuscular once  imipramine 50 milliGRAM(s) Oral daily  insulin lispro (ADMELOG) corrective regimen sliding scale   SubCutaneous three times a day before meals  lidocaine   4% Patch 1 Patch Transdermal daily  metoprolol tartrate 100 milliGRAM(s) Oral two times a day  mirtazapine 7.5 milliGRAM(s) Oral at bedtime  multivitamin 1 Tablet(s) Oral daily  pantoprazole    Tablet 40 milliGRAM(s) Oral before breakfast  polyethylene glycol 3350 17 Gram(s) Oral daily  senna 2 Tablet(s) Oral at bedtime    MEDICATIONS  (PRN):  dextrose Oral Gel 15 Gram(s) Oral once PRN Blood Glucose LESS THAN 70 milliGRAM(s)/deciliter  morphine  - Injectable 2 milliGRAM(s) IV Push every 6 hours PRN Severe Pain (7 - 10)  traMADol 50 milliGRAM(s) Oral every 8 hours PRN Severe Pain (7 - 10)  traMADol 25 milliGRAM(s) Oral every 6 hours PRN Moderate Pain (4 - 6)      OBJECTIVE    T(C): 36.9 (07-02-23 @ 12:06), Max: 36.9 (07-02-23 @ 02:05)  HR: 59 (07-02-23 @ 12:06) (59 - 78)  BP: 147/69 (07-02-23 @ 12:06) (96/61 - 174/61)  RR: 18 (07-02-23 @ 12:06) (16 - 18)  SpO2: 92% (07-02-23 @ 12:06) (92% - 98%)  Wt(kg): --  I&O's Summary    01 Jul 2023 07:01  -  02 Jul 2023 07:00  --------------------------------------------------------  IN: 0 mL / OUT: 1900 mL / NET: -1900 mL          REVIEW OF SYSTEMS:  CONSTITUTIONAL: No fever, weight loss, or fatigue  EYES: No eye pain, visual disturbances, or discharge  ENMT:   No sinus or throat pain  NECK: No pain or stiffness  RESPIRATORY: No cough, wheezing, chills or hemoptysis; No shortness of breath  CARDIOVASCULAR: No chest pain, palpitations, dizziness, or leg swelling  GASTROINTESTINAL: No abdominal pain. No nausea, vomiting; No diarrhea or constipation. No melena or hematochezia.  GENITOURINARY: No dysuria, frequency, hematuria, or incontinence  NEUROLOGICAL: No headaches, memory loss, loss of strength, numbness, or tremors  SKIN: No itching, burning, rashes, or lesions   MUSCULOSKELETAL: No joint pain or swelling; No muscle, back, or extremity pain    PHYSICAL EXAM:  Appearance: NAD. VS past 24 hrs -as above   HEENT:   Moist oral mucosa. Conjunctiva clear b/l.   Neck : supple  Respiratory: Lungs CTAB.  Gastrointestinal:  Soft, nontender. No rebound. No rigidity. BS present	  Cardiovascular: RRR ,S1S2 present  Neurologic: Non-focal. Moving all extremities.  Extremities: No edema. No erythema. No calf tenderness.  Skin: No rashes, No ecchymoses, No cyanosis.	  wounds ,skin lesions-See skin assesment flow sheet   LABS:                        11.8   14.36 )-----------( 265      ( 02 Jul 2023 09:01 )             37.0     07-02    132<L>  |  97  |  39<H>  ----------------------------<  188<H>  5.0   |  26  |  4.10<H>    Ca    9.1      02 Jul 2023 09:01    TPro  7.7  /  Alb  3.1<L>  /  TBili  0.4  /  DBili  x   /  AST  25  /  ALT  28  /  AlkPhos  136<H>  07-01    CAPILLARY BLOOD GLUCOSE      POCT Blood Glucose.: 217 mg/dL (02 Jul 2023 12:13)  POCT Blood Glucose.: 178 mg/dL (02 Jul 2023 08:18)  POCT Blood Glucose.: 118 mg/dL (01 Jul 2023 18:51)  POCT Blood Glucose.: 152 mg/dL (01 Jul 2023 15:01)    PT/INR - ( 01 Jul 2023 10:00 )   PT: 14.9 sec;   INR: 1.27 ratio         PTT - ( 01 Jul 2023 10:00 )  PTT:31.8 sec  Urinalysis Basic - ( 02 Jul 2023 09:01 )    Color: x / Appearance: x / SG: x / pH: x  Gluc: 188 mg/dL / Ketone: x  / Bili: x / Urobili: x   Blood: x / Protein: x / Nitrite: x   Leuk Esterase: x / RBC: x / WBC x   Sq Epi: x / Non Sq Epi: x / Bacteria: x        RADIOLOGY & ADDITIONAL TESTS:   reviewed elctronically  ASSESSMENT/PLAN: 	    25 minutes aggregate time was spent on this visit, 50% visit time spent in care co-ordination with other attendings and counselling patient .I have discussed care plan with patient / HCP/family member ,who expressed understanding of problems treatment and their effect and side effects, alternatives in details. I have asked if they have any questions and concerns and appropriately addressed them to best of my ability.

## 2023-07-02 NOTE — PHYSICAL THERAPY INITIAL EVALUATION ADULT - CRITERIA FOR SKILLED THERAPEUTIC INTERVENTIONS
impairments found/functional limitations in following categories/rehab potential/therapy frequency
SYNCOPE

## 2023-07-02 NOTE — PROGRESS NOTE ADULT - ASSESSMENT
72-year-old female with significant past medical history for hypertension, diabetes, dementia, end-stage renal disease on hemodialysis presents to the ED status post unwitnessed fall from [facility].  Patient states that she heard some voices then rolled out of bed.  Patient complaining of right hip pain.  Unknown head trauma.  + Eliquis < from: Xray Femur showed  Fractures including the medial wall of the acetabulum and inferior right pubic ramus Admitted for pain management ,PT/OT

## 2023-07-02 NOTE — PHYSICAL THERAPY INITIAL EVALUATION ADULT - TRANSFER TRAINING, PT EVAL
Patient will transfer from all surfaces with mod A x 2 in 2 weeks in order to increase mobility at home

## 2023-07-02 NOTE — PHYSICAL THERAPY INITIAL EVALUATION ADULT - BED MOBILITY TRAINING, PT EVAL
Patient will perform all bed mobility with min A x 1 in 2 weeks in order to increase mobility at home

## 2023-07-02 NOTE — PROGRESS NOTE ADULT - ASSESSMENT
. 7/1/2023 72-year-old female former smoker quit 3/2012  with significant past medical history for hypertension, CAD sp cabg  PVD  diabetes, dementia, end-stage renal disease on hemodialysis   a fib on eliquis sp recent hosp stay 5/17-5/24/2023  admitted 3/7-3/11/2023 and before that 2/22-2/25/2023   . During 5/2023 admission she had   . ENTERORHINOVIRUS INFECTION RESP TRACT 5/17  . PLEURAL EFFUSION SP l THORA 5/18 TRANSUDATE     . Patient now  presented to the ED 7/1/2023 status post unwitnessed fall from [facility].  Patient states that she heard some voices then rolled out of bed.  Patient complaining of right hip pain.  Unknown head trauma.  + Eliquis     She was found to have acetabular fracture which Ortho was contacted and they plan non operative management Pt was admitted to [facility] for bleed as she was on apixaban and was noted to have pl effsn    . 7/1/2023  I was asked to admit pt                      (01 Jul 2023 13:32)      esrd on hd tues thurs sat     ANEMIA PLAN:  Anemia of chronic disease:  Well controlled by Aranesp  H and H subtherapeutic .  We will continue Aranesp aiming for a HCT of 32-36 %.   We will monitor Iron stores, B12 and RBC folate .      BP monitoring,continue current antihypertensive meds, low salt diet,followup with PMD in 1-2 weeks      Accuchecks monitoring and insulin sliding scale coverage, no concentrated sweets diet, serial labs and f/up with PMD, monitor HB A 1 C every 3-4 mnth

## 2023-07-02 NOTE — PROGRESS NOTE ADULT - ASSESSMENT
fx rt pelvis with hematoma- hold eliquis  s/p fall  ashd - s/p cabg  s/p mi  s/p stent  esrd - on hd  dm2  hypertension  paf  depression  pvd  discussed with spouse 7/1

## 2023-07-02 NOTE — PATIENT PROFILE ADULT - HOW PATIENT ADDRESSED, PROFILE
Mario Damon is a 3 year old female presenting with ear pain  Denies known Latex allergy or symptoms of Latex sensitivity.  Medications verified, no changes.  Health Maintenance Summary     Topic Due On Due Status Completed On    IMMUNIZATION - IPV  Completed Aug 17, 2016    IMMUNIZATION - MMR  Completed Aug 17, 2016    IMMUNIZATION - VARICELLA  Completed Aug 17, 2016    IMMUNIZATION - HEPATITIS B  Completed Aug 30, 2012    IMMUNIZATION - ROTAVIRUS  Completed Aug 30, 2012    IMMUNIZATION - HEPATITIS A  Completed Aug 5, 2015    IMMUNIZATION - MENINGITIS Jan 24, 2023 Not Due     IMMUNIZATION - DTaP/Tdap/Td Jan 24, 2023 Not Due Aug 17, 2016    Immunization-Influenza Sep 1, 2017 Overdue           Patient is due for topics as listed above, she wishes to discuss with provider .               Sun

## 2023-07-02 NOTE — CONSULT NOTE ADULT - SUBJECTIVE AND OBJECTIVE BOX
HPI:  73YO F PMH hypertension, diabetes, dementia, end-stage renal disease on hemodialysis presents to the ED status post unwitnessed fall from [facility]- rolled out of bed.  Patient complaining of right hip pain.   Xray Femur showed Fractures including the medial wall of the acetabulum and inferior right pubic ramus Admitted for pain management. WBc 14k No fever chills n/v/d CP SOB abd pain urinary symptoms.    Infectious Disease consult was called to evaluate pt.    Past Medical & Surgical Hx:  PAST MEDICAL & SURGICAL HISTORY:  HTN (hypertension)  h/o Anxiety attack  Depression  h/o Myocardial infarct 2007  h/o Hepatitis A 1969  currently resolved, no symptoms  Murmur, cardiac  h/o Smoking  quitted 3/2012  Anemia secondary to renal failure  ESRD on dialysis  Falls  Ataxia  Type 2 diabetes mellitus  Peripheral vascular disease, unspecified  CAD (coronary artery disease)  Diabetes  coronary stent 2007  s/p Ovarian cyst removal  s/p surgical removal of benign Skin lesion epigastric area  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      Social History--  EtOH: denies   Tobacco: denies  Drug Use: denies     FAMILY HISTORY:  FH: myocardial infarction (Father)    FH: myocardial infarction (Father)    Family history of type 2 diabetes mellitus in brother (Sibling)      Allergies  No Known Drug Allergies  latex (Hives)    Intolerances  NONE      Home Medications:  acetaminophen 325 mg oral tablet: 2 tab(s) orally every 6 hours as needed (02 Jul 2023 15:24)  Admelog SoloStar 100 units/mL injectable solution: injectable 3 times a day (before meals)sliding scale  (02 Jul 2023 15:24)  apixaban 2.5 mg oral tablet: 1 tab(s) orally 2 times a day (02 Jul 2023 15:24)  aspirin 81 mg oral delayed release tablet: 1 tab(s) orally once a day (at bedtime) (02 Jul 2023 15:24)  atorvastatin 20 mg oral tablet: 1 tab(s) orally once a day (at bedtime) (02 Jul 2023 15:24)  bacitracin 500 units/g topical ointment: Apply topically to affected area once a day to right knee abrasion (02 Jul 2023 11:54)  Basaglar KwikPen 100 units/mL subcutaneous solution: 8 international unit(s) subcutaneous once a day (at bedtime) (02 Jul 2023 15:24)  cloNIDine 0.1 mg oral tablet: 1 tab(s) orally 2 times a day (02 Jul 2023 15:24)  DilTIAZem (Eqv-Cardizem CD) 180 mg/24 hours oral capsule, extended release: 1 cap(s) orally once a day (02 Jul 2023 15:24)  imipramine 50 mg oral tablet: 1 tab(s) orally once a day (in the evening) (02 Jul 2023 15:24)  metoprolol tartrate 100 mg oral tablet: 1 tab(s) orally 2 times a day (02 Jul 2023 15:24)  mirtazapine 7.5 mg oral tablet: 1 tab(s) orally once a day (at bedtime) (02 Jul 2023 15:24)  Nephro-Alfie oral tablet: 1 tab(s) orally once a day (02 Jul 2023 15:24)  PANTOPRAZOLE 40MG DR TAB: 1 tab(s) orally once a day (02 Jul 2023 15:24)  senna leaf extract oral tablet: 2 tab(s) orally once a day (at bedtime) (02 Jul 2023 15:24)      Current Inpatient Medications :    ANTIBIOTICS:       OTHER RELEVANT MEDICATIONS :  acetaminophen     Tablet .. 650 milliGRAM(s) Oral every 6 hours  aspirin enteric coated 81 milliGRAM(s) Oral daily  cloNIDine 0.1 milliGRAM(s) Oral two times a day  dextrose 5%. 1000 milliLiter(s) IV Continuous <Continuous>  dextrose 5%. 1000 milliLiter(s) IV Continuous <Continuous>  dextrose 50% Injectable 12.5 Gram(s) IV Push once  dextrose 50% Injectable 25 Gram(s) IV Push once  dextrose 50% Injectable 25 Gram(s) IV Push once  dextrose Oral Gel 15 Gram(s) Oral once PRN  diltiazem    Tablet 60 milliGRAM(s) Oral three times a day  dronabinol 2.5 milliGRAM(s) Oral two times a day before meals  glucagon  Injectable 1 milliGRAM(s) IntraMuscular once  imipramine 50 milliGRAM(s) Oral daily  insulin lispro (ADMELOG) corrective regimen sliding scale   SubCutaneous every 6 hours  lidocaine   4% Patch 1 Patch Transdermal daily  metoprolol tartrate 100 milliGRAM(s) Oral two times a day  mirtazapine 7.5 milliGRAM(s) Oral at bedtime  morphine  - Injectable 2 milliGRAM(s) IV Push every 6 hours PRN  multivitamin 1 Tablet(s) Oral daily  pantoprazole    Tablet 40 milliGRAM(s) Oral before breakfast  polyethylene glycol 3350 17 Gram(s) Oral daily  senna 2 Tablet(s) Oral at bedtime  traMADol 25 milliGRAM(s) Oral every 6 hours PRN  traMADol 50 milliGRAM(s) Oral every 8 hours PRN      ROS:  Unable to obtain due to : poor historian    I&O's Detail    01 Jul 2023 07:01  -  02 Jul 2023 07:00  --------------------------------------------------------  IN:  Total IN: 0 mL    OUT:    Other (mL): 1500 mL    Voided (mL): 400 mL  Total OUT: 1900 mL    Total NET: -1900 mL    Physical Exam:  Vital Signs Last 24 Hrs  T(C): 37.1 (02 Jul 2023 20:23), Max: 37.1 (02 Jul 2023 20:23)  T(F): 98.7 (02 Jul 2023 20:23), Max: 98.7 (02 Jul 2023 20:23)  HR: 81 (02 Jul 2023 21:50) (59 - 81)  BP: 130/66 (02 Jul 2023 21:50) (130/66 - 174/61)  BP(mean): --  RR: 17 (02 Jul 2023 20:23) (16 - 18)  SpO2: 91% (02 Jul 2023 20:23) (91% - 96%)    Parameters below as of 02 Jul 2023 20:23  Patient On (Oxygen Delivery Method): room air          General: well developed well nourished, in no acute distress  Neck: supple, trachea midline  Lungs: clear, no wheeze/rhonchi  Cardiovascular: regular rate and rhythm, S1 S2  Abdomen: soft, nontender, ND, bowel sounds normal  Neurological:  awake 0x 2  Skin: no rash  Extremities: no cyanosis/clubbing/edema    Labs:                         11.8   14.36 )-----------( 265      ( 02 Jul 2023 09:01 )             37.0   07-02    132<L>  |  97  |  39<H>  ----------------------------<  188<H>  5.0   |  26  |  4.10<H>    Ca    9.1      02 Jul 2023 09:01    TPro  7.7  /  Alb  3.1<L>  /  TBili  0.4  /  DBili  x   /  AST  25  /  ALT  28  /  AlkPhos  136<H>  07-01      RECENT CULTURES:          RADIOLOGY & ADDITIONAL STUDIES:    ACC: [id number] EXAM:  XR PELVIS COMPLETE MIN 3 VIEWS   ORDERED BY: RAMANA WINSTON     ACC: [id number] EXAM:  XR FEMUR 2 VIEWS RT   ORDERED BY: RAMANA WINSTON     ACC: [id number] EXAM:  XR HIP 2-3V RT   ORDERED BY: RAMANA WINSTON     PROCEDURE DATE:  07/01/2023         INTERPRETATION:  Pelvis AP and right hip 2 views and right femur 2 views    CLINICAL HISTORY: Status post fall, right acetabular fracture    COMPARISON: CT imaging of the same day    FINDINGS: The bone is demineralized. Right medial wall acetabular   fracture is seen and is described on CT imaging. Fracture of the right   inferior pubic ramus is apparent    The remaining bones are intact with no additional fractures are seen.    Vascular calcifications are present.    IMPRESSION: Fractures including the medial wall of the acetabulum and   inferior right pubic ramus    Assessment :   73YO F PMH hypertension, diabetes, dementia, end-stage renal disease on hemodialysis presents to the ED status post unwitnessed fall from [facility]- rolled out of bed.  Patient complaining of right hip pain.   Xray Femur showed Fractures including the medial wall of the acetabulum and inferior right pubic ramus Admitted for pain management. WBc 14k  Leukocytosis reative  No s/s of infectious process     Plan :   Monitor off antibiotics  Trend temps and cbc  Fu cultures  Pain management  Asp precautions  Stable from ID standpoint    Continue with present regiment .  Approptiate use of antibiotics and adverse effects reviewed.      > 45 minutes spent in direct patient care reviewing  the notes, lab data/ imaging , discussion with multidisciplinary team. All questions were addressed and answered to the best of my capacity .    Thank you for allowing me to participate in the care of your patient .      Parul Napier MD  Infectious Disease  [phone]

## 2023-07-02 NOTE — PHYSICAL THERAPY INITIAL EVALUATION ADULT - PERTINENT HX OF CURRENT PROBLEM, REHAB EVAL
72-year-old female with significant past medical history for hypertension, diabetes, dementia, end-stage renal disease on hemodialysis presents to the ED status post unwitnessed fall from [facility].  Patient states that she heard some voices then rolled out of bed.  Patient complaining of right hip pain.  Unknown head trauma.  + Eliquis < from: Xray Femur showed  Fractures including the medial wall of the acetabulum and inferior right pubic ramus Admitted for pain management ,PT/OT

## 2023-07-02 NOTE — PATIENT PROFILE ADULT - FALL HARM RISK - HARM RISK INTERVENTIONS

## 2023-07-02 NOTE — PROGRESS NOTE ADULT - SUBJECTIVE AND OBJECTIVE BOX
CHIEF COMPLAINT/ REASON FOR VISIT  .. Patient was seen to address the  issue listed under PROBLEM LIST which is located toward bottom of this note     SHILO KOHLER    PLV [id number] D1    Allergies    No Known Drug Allergies  latex (Hives)    Intolerances        PAST MEDICAL & SURGICAL HISTORY:  HTN (hypertension)      h/o Anxiety attack      Depression      h/o Myocardial infarct 2007      h/o Hepatitis A 1969  currently resolved, no symptoms      Murmur, cardiac      h/o Smoking  quitted 3/2012      Anemia secondary to renal failure      ESRD on dialysis      Falls      Ataxia      Type 2 diabetes mellitus      Peripheral vascular disease, unspecified      CAD (coronary artery disease)      Diabetes      coronary stent 2007      s/p Ovarian cyst removal      s/p surgical removal of benign Skin lesion epigastric area      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          FAMILY HISTORY:  FH: myocardial infarction (Father)    FH: myocardial infarction (Father)    Family history of type 2 diabetes mellitus in brother (Sibling)        Home Medications:  acetaminophen 325 mg oral tablet: 2 tab(s) orally every 6 hours, As needed, Temp greater or equal to 38C (100.4F), Mild Pain (1 - 3) (17 May 2023 14:45)  Admelog SoloStar 100 units/mL injectable solution: injectable 3 times a day (before meals)sliding scale  (17 May 2023 14:45)  apixaban 2.5 mg oral tablet: 1 tab(s) orally 2 times a day (23 May 2023 14:39)  aspirin 81 mg oral delayed release tablet: 1 tab(s) orally once a day (at bedtime) (17 May 2023 14:45)  Basaglar KwikPen 100 units/mL subcutaneous solution: 10 unit(s) subcutaneous once a day (at bedtime) (23 May 2023 14:39)  cloNIDine 0.1 mg oral tablet: 1 tab(s) orally 2 times a day (17 May 2023 14:45)  dilTIAZem 60 mg oral tablet: 1 tab(s) orally 3 times a day (23 May 2023 14:39)  droNABinol 2.5 mg oral capsule: 1 cap(s) orally 2 times a day (before meals) (23 May 2023 14:39)  imipramine 50 mg oral tablet: 1 tab(s) orally once a day (17 May 2023 14:45)  metoprolol tartrate 100 mg oral tablet: 1 tab(s) orally 2 times a day (23 May 2023 14:39)  mirtazapine 7.5 mg oral tablet: 1 tab(s) orally once a day (at bedtime) (23 May 2023 14:39)  Mucinex 600 mg oral tablet, extended release: 1 tab(s) orally 2 times a day (23 May 2023 14:39)  Nephro-Alfie oral tablet: 1 tab(s) orally once a day (17 May 2023 14:45)  PANTOPRAZOLE 40MG DR TAB: 1 tab(s) orally once a day (17 May 2023 14:45)  senna leaf extract oral tablet: 2 tab(s) orally once a day (at bedtime) (23 May 2023 14:39)      MEDICATIONS  (STANDING):  acetaminophen     Tablet .. 650 milliGRAM(s) Oral every 6 hours  apixaban 2.5 milliGRAM(s) Oral two times a day  aspirin enteric coated 81 milliGRAM(s) Oral daily  cloNIDine 0.1 milliGRAM(s) Oral two times a day  dextrose 5%. 1000 milliLiter(s) (100 mL/Hr) IV Continuous <Continuous>  dextrose 5%. 1000 milliLiter(s) (50 mL/Hr) IV Continuous <Continuous>  dextrose 50% Injectable 12.5 Gram(s) IV Push once  dextrose 50% Injectable 25 Gram(s) IV Push once  dextrose 50% Injectable 25 Gram(s) IV Push once  diltiazem    Tablet 60 milliGRAM(s) Oral three times a day  dronabinol 2.5 milliGRAM(s) Oral two times a day before meals  glucagon  Injectable 1 milliGRAM(s) IntraMuscular once  imipramine 50 milliGRAM(s) Oral daily  insulin lispro (ADMELOG) corrective regimen sliding scale   SubCutaneous three times a day before meals  lidocaine   4% Patch 1 Patch Transdermal daily  metoprolol tartrate 100 milliGRAM(s) Oral two times a day  mirtazapine 7.5 milliGRAM(s) Oral at bedtime  multivitamin 1 Tablet(s) Oral daily  pantoprazole    Tablet 40 milliGRAM(s) Oral before breakfast  polyethylene glycol 3350 17 Gram(s) Oral daily  senna 2 Tablet(s) Oral at bedtime    MEDICATIONS  (PRN):  dextrose Oral Gel 15 Gram(s) Oral once PRN Blood Glucose LESS THAN 70 milliGRAM(s)/deciliter  morphine  - Injectable 2 milliGRAM(s) IV Push every 6 hours PRN Severe Pain (7 - 10)  traMADol 25 milliGRAM(s) Oral every 6 hours PRN Moderate Pain (4 - 6)  traMADol 50 milliGRAM(s) Oral every 8 hours PRN Severe Pain (7 - 10)              Vital Signs Last 24 Hrs  T(C): 36.8 (02 Jul 2023 04:57), Max: 36.9 (02 Jul 2023 02:05)  T(F): 98.2 (02 Jul 2023 04:57), Max: 98.5 (02 Jul 2023 02:05)  HR: 71 (02 Jul 2023 04:57) (62 - 78)  BP: 165/74 (02 Jul 2023 04:57) (96/61 - 174/61)  BP(mean): --  RR: 16 (02 Jul 2023 04:57) (16 - 18)  SpO2: 92% (02 Jul 2023 04:57) (92% - 98%)    Parameters below as of 02 Jul 2023 04:57  Patient On (Oxygen Delivery Method): room air          07-01-23 @ 07:01  -  07-02-23 @ 06:55  --------------------------------------------------------  IN: 0 mL / OUT: 1900 mL / NET: -1900 mL              LABS:                        13.1   15.57 )-----------( 281      ( 01 Jul 2023 10:00 )             40.8     07-01    135  |  98  |  47<H>  ----------------------------<  155<H>  5.1   |  29  |  5.00<H>    Ca    9.7      01 Jul 2023 10:00    TPro  7.7  /  Alb  3.1<L>  /  TBili  0.4  /  DBili  x   /  AST  25  /  ALT  28  /  AlkPhos  136<H>  07-01    PT/INR - ( 01 Jul 2023 10:00 )   PT: 14.9 sec;   INR: 1.27 ratio         PTT - ( 01 Jul 2023 10:00 )  PTT:31.8 sec  Urinalysis Basic - ( 01 Jul 2023 10:00 )    Color: x / Appearance: x / SG: x / pH: x  Gluc: 155 mg/dL / Ketone: x  / Bili: x / Urobili: x   Blood: x / Protein: x / Nitrite: x   Leuk Esterase: x / RBC: x / WBC x   Sq Epi: x / Non Sq Epi: x / Bacteria: x            WBC:  WBC Count: 15.57 K/uL (07-01 @ 10:00)      MICROBIOLOGY:  RECENT CULTURES:              PT/INR - ( 01 Jul 2023 10:00 )   PT: 14.9 sec;   INR: 1.27 ratio         PTT - ( 01 Jul 2023 10:00 )  PTT:31.8 sec    Sodium:  Sodium: 135 mmol/L (07-01 @ 10:00)      5.00 mg/dL 07-01 @ 10:00      Hemoglobin:  Hemoglobin: 13.1 g/dL (07-01 @ 10:00)      Platelets: Platelet Count - Automated: 281 K/uL (07-01 @ 10:00)      LIVER FUNCTIONS - ( 01 Jul 2023 10:00 )  Alb: 3.1 g/dL / Pro: 7.7 g/dL / ALK PHOS: 136 U/L / ALT: 28 U/L / AST: 25 U/L / GGT: x             Urinalysis Basic - ( 01 Jul 2023 10:00 )    Color: x / Appearance: x / SG: x / pH: x  Gluc: 155 mg/dL / Ketone: x  / Bili: x / Urobili: x   Blood: x / Protein: x / Nitrite: x   Leuk Esterase: x / RBC: x / WBC x   Sq Epi: x / Non Sq Epi: x / Bacteria: x        RADIOLOGY & ADDITIONAL STUDIES:      MICROBIOLOGY:  RECENT CULTURES:

## 2023-07-03 DIAGNOSIS — J90 PLEURAL EFFUSION, NOT ELSEWHERE CLASSIFIED: ICD-10-CM

## 2023-07-03 LAB
A1C WITH ESTIMATED AVERAGE GLUCOSE RESULT: 7 % — HIGH (ref 4–5.6)
ALBUMIN SERPL ELPH-MCNC: 2.8 G/DL — LOW (ref 3.3–5)
ALP SERPL-CCNC: 116 U/L — SIGNIFICANT CHANGE UP (ref 40–120)
ALT FLD-CCNC: 17 U/L — SIGNIFICANT CHANGE UP (ref 12–78)
ANION GAP SERPL CALC-SCNC: 9 MMOL/L — SIGNIFICANT CHANGE UP (ref 5–17)
AST SERPL-CCNC: 14 U/L — LOW (ref 15–37)
BASOPHILS # BLD AUTO: 0.07 K/UL — SIGNIFICANT CHANGE UP (ref 0–0.2)
BASOPHILS NFR BLD AUTO: 0.5 % — SIGNIFICANT CHANGE UP (ref 0–2)
BILIRUB SERPL-MCNC: 0.4 MG/DL — SIGNIFICANT CHANGE UP (ref 0.2–1.2)
BUN SERPL-MCNC: 67 MG/DL — HIGH (ref 7–23)
CALCIUM SERPL-MCNC: 9.1 MG/DL — SIGNIFICANT CHANGE UP (ref 8.5–10.1)
CHLORIDE SERPL-SCNC: 94 MMOL/L — LOW (ref 96–108)
CO2 SERPL-SCNC: 30 MMOL/L — SIGNIFICANT CHANGE UP (ref 22–31)
CREAT SERPL-MCNC: 5.8 MG/DL — HIGH (ref 0.5–1.3)
EGFR: 7 ML/MIN/1.73M2 — LOW
EOSINOPHIL # BLD AUTO: 0.31 K/UL — SIGNIFICANT CHANGE UP (ref 0–0.5)
EOSINOPHIL NFR BLD AUTO: 2.2 % — SIGNIFICANT CHANGE UP (ref 0–6)
ESTIMATED AVERAGE GLUCOSE: 154 MG/DL — HIGH (ref 68–114)
GLUCOSE SERPL-MCNC: 237 MG/DL — HIGH (ref 70–99)
HCT VFR BLD CALC: 35.4 % — SIGNIFICANT CHANGE UP (ref 34.5–45)
HGB BLD-MCNC: 11.2 G/DL — LOW (ref 11.5–15.5)
IMM GRANULOCYTES NFR BLD AUTO: 1.6 % — HIGH (ref 0–0.9)
INR BLD: 1.1 RATIO — SIGNIFICANT CHANGE UP (ref 0.88–1.16)
LDH SERPL L TO P-CCNC: 191 U/L — SIGNIFICANT CHANGE UP (ref 50–242)
LYMPHOCYTES # BLD AUTO: 0.77 K/UL — LOW (ref 1–3.3)
LYMPHOCYTES # BLD AUTO: 5.5 % — LOW (ref 13–44)
MCHC RBC-ENTMCNC: 30.9 PG — SIGNIFICANT CHANGE UP (ref 27–34)
MCHC RBC-ENTMCNC: 31.6 GM/DL — LOW (ref 32–36)
MCV RBC AUTO: 97.8 FL — SIGNIFICANT CHANGE UP (ref 80–100)
MONOCYTES # BLD AUTO: 1.62 K/UL — HIGH (ref 0–0.9)
MONOCYTES NFR BLD AUTO: 11.6 % — SIGNIFICANT CHANGE UP (ref 2–14)
NEUTROPHILS # BLD AUTO: 10.95 K/UL — HIGH (ref 1.8–7.4)
NEUTROPHILS NFR BLD AUTO: 78.6 % — HIGH (ref 43–77)
NRBC # BLD: 0 /100 WBCS — SIGNIFICANT CHANGE UP (ref 0–0)
PLATELET # BLD AUTO: 274 K/UL — SIGNIFICANT CHANGE UP (ref 150–400)
POTASSIUM SERPL-MCNC: 4.9 MMOL/L — SIGNIFICANT CHANGE UP (ref 3.5–5.3)
POTASSIUM SERPL-SCNC: 4.9 MMOL/L — SIGNIFICANT CHANGE UP (ref 3.5–5.3)
PROCALCITONIN SERPL-MCNC: 1.64 NG/ML — HIGH
PROT SERPL-MCNC: 7.1 G/DL — SIGNIFICANT CHANGE UP (ref 6–8.3)
PROTHROM AB SERPL-ACNC: 12.9 SEC — SIGNIFICANT CHANGE UP (ref 10.5–13.4)
RBC # BLD: 3.62 M/UL — LOW (ref 3.8–5.2)
RBC # FLD: 16.6 % — HIGH (ref 10.3–14.5)
SODIUM SERPL-SCNC: 133 MMOL/L — LOW (ref 135–145)
WBC # BLD: 13.94 K/UL — HIGH (ref 3.8–10.5)
WBC # FLD AUTO: 13.94 K/UL — HIGH (ref 3.8–10.5)

## 2023-07-03 PROCEDURE — 99233 SBSQ HOSP IP/OBS HIGH 50: CPT

## 2023-07-03 PROCEDURE — 71045 X-RAY EXAM CHEST 1 VIEW: CPT | Mod: 26

## 2023-07-03 PROCEDURE — 99232 SBSQ HOSP IP/OBS MODERATE 35: CPT

## 2023-07-03 RX ORDER — APIXABAN 2.5 MG/1
2.5 TABLET, FILM COATED ORAL
Refills: 0 | Status: DISCONTINUED | OUTPATIENT
Start: 2023-07-03 | End: 2023-07-10

## 2023-07-03 RX ORDER — ACETAMINOPHEN 500 MG
650 TABLET ORAL EVERY 6 HOURS
Refills: 0 | Status: DISCONTINUED | OUTPATIENT
Start: 2023-07-03 | End: 2023-07-17

## 2023-07-03 RX ORDER — ACETAMINOPHEN 500 MG
650 TABLET ORAL ONCE
Refills: 0 | Status: COMPLETED | OUTPATIENT
Start: 2023-07-03 | End: 2023-07-03

## 2023-07-03 RX ORDER — INSULIN LISPRO 100/ML
VIAL (ML) SUBCUTANEOUS AT BEDTIME
Refills: 0 | Status: DISCONTINUED | OUTPATIENT
Start: 2023-07-03 | End: 2023-07-11

## 2023-07-03 RX ORDER — INSULIN LISPRO 100/ML
VIAL (ML) SUBCUTANEOUS
Refills: 0 | Status: DISCONTINUED | OUTPATIENT
Start: 2023-07-03 | End: 2023-07-08

## 2023-07-03 RX ADMIN — Medication 650 MILLIGRAM(S): at 21:40

## 2023-07-03 RX ADMIN — Medication 650 MILLIGRAM(S): at 06:19

## 2023-07-03 RX ADMIN — Medication 650 MILLIGRAM(S): at 00:00

## 2023-07-03 RX ADMIN — Medication 100 MILLIGRAM(S): at 05:20

## 2023-07-03 RX ADMIN — Medication 2.5 MILLIGRAM(S): at 05:21

## 2023-07-03 RX ADMIN — Medication 100 MILLIGRAM(S): at 18:25

## 2023-07-03 RX ADMIN — Medication 2.5 MILLIGRAM(S): at 18:27

## 2023-07-03 RX ADMIN — PANTOPRAZOLE SODIUM 40 MILLIGRAM(S): 20 TABLET, DELAYED RELEASE ORAL at 05:20

## 2023-07-03 RX ADMIN — Medication 650 MILLIGRAM(S): at 23:00

## 2023-07-03 RX ADMIN — Medication 2: at 08:35

## 2023-07-03 RX ADMIN — Medication 60 MILLIGRAM(S): at 05:20

## 2023-07-03 RX ADMIN — Medication 650 MILLIGRAM(S): at 18:26

## 2023-07-03 RX ADMIN — Medication 60 MILLIGRAM(S): at 21:12

## 2023-07-03 RX ADMIN — Medication 2: at 12:37

## 2023-07-03 RX ADMIN — Medication 0.1 MILLIGRAM(S): at 05:20

## 2023-07-03 RX ADMIN — Medication 0.1 MILLIGRAM(S): at 18:30

## 2023-07-03 RX ADMIN — Medication 60 MILLIGRAM(S): at 14:25

## 2023-07-03 RX ADMIN — Medication 650 MILLIGRAM(S): at 05:19

## 2023-07-03 RX ADMIN — Medication 81 MILLIGRAM(S): at 18:30

## 2023-07-03 RX ADMIN — APIXABAN 2.5 MILLIGRAM(S): 2.5 TABLET, FILM COATED ORAL at 18:28

## 2023-07-03 RX ADMIN — SENNA PLUS 2 TABLET(S): 8.6 TABLET ORAL at 21:12

## 2023-07-03 RX ADMIN — LIDOCAINE 1 PATCH: 4 CREAM TOPICAL at 19:15

## 2023-07-03 RX ADMIN — LIDOCAINE 1 PATCH: 4 CREAM TOPICAL at 12:38

## 2023-07-03 RX ADMIN — Medication 3: at 22:01

## 2023-07-03 NOTE — DIETITIAN INITIAL EVALUATION ADULT - OTHER INFO
Reason for Admission: s/p fall  History of Present Illness:   72-year-old female with significant past medical history for hypertension, diabetes, dementia, end-stage renal disease on hemodialysis presents to the ED status post unwitnessed fall from [facility].  Patient states that she heard some voices then rolled out of bed.  Patient complaining of right hip pain.  Unknown head trauma.  + Eliquis < from: Xray Femur showed  Fractures including the medial wall of the acetabulum and inferior right pubic ramus Admitted for pain management ,PT/OT   7/3 thoracentesis  7/3 fecal incontinence   weight 119# this admit 120.8# transfer papers   weight 130# May admit this year

## 2023-07-03 NOTE — PROGRESS NOTE ADULT - SUBJECTIVE AND OBJECTIVE BOX
[facility]  INFECTIOUS DISEASES - [address], [place] [address]  Tel: [phone]     Fax: [phone]  =======================================================    SHILO KOHLER [id number]    Follow up: No fevers. Poor historian, but denied any SOB.    Allergies:  No Known Drug Allergies  latex (Hives)      Antibiotics:  acetaminophen     Tablet .. 650 milliGRAM(s) Oral every 6 hours  apixaban 2.5 milliGRAM(s) Oral two times a day  aspirin enteric coated 81 milliGRAM(s) Oral daily  cloNIDine 0.1 milliGRAM(s) Oral two times a day  dextrose 5%. 1000 milliLiter(s) IV Continuous <Continuous>  dextrose 5%. 1000 milliLiter(s) IV Continuous <Continuous>  dextrose 50% Injectable 12.5 Gram(s) IV Push once  dextrose 50% Injectable 25 Gram(s) IV Push once  dextrose 50% Injectable 25 Gram(s) IV Push once  dextrose Oral Gel 15 Gram(s) Oral once PRN  diltiazem    Tablet 60 milliGRAM(s) Oral three times a day  dronabinol 2.5 milliGRAM(s) Oral two times a day before meals  glucagon  Injectable 1 milliGRAM(s) IntraMuscular once  imipramine 50 milliGRAM(s) Oral daily  insulin lispro (ADMELOG) corrective regimen sliding scale   SubCutaneous every 6 hours  lidocaine   4% Patch 1 Patch Transdermal daily  metoprolol tartrate 100 milliGRAM(s) Oral two times a day  mirtazapine 7.5 milliGRAM(s) Oral at bedtime  morphine  - Injectable 2 milliGRAM(s) IV Push every 6 hours PRN  multivitamin 1 Tablet(s) Oral daily  pantoprazole    Tablet 40 milliGRAM(s) Oral before breakfast  polyethylene glycol 3350 17 Gram(s) Oral daily  senna 2 Tablet(s) Oral at bedtime  traMADol 50 milliGRAM(s) Oral every 8 hours PRN  traMADol 25 milliGRAM(s) Oral every 6 hours PRN       REVIEW OF SYSTEMS:  unable to obtain 2/2 dementia     Physical Exam:  ICU Vital Signs Last 24 Hrs  T(C): 37.2 (03 Jul 2023 13:36), Max: 37.2 (03 Jul 2023 13:36)  T(F): 98.9 (03 Jul 2023 13:36), Max: 98.9 (03 Jul 2023 13:36)  HR: 66 (03 Jul 2023 13:36) (63 - 86)  BP: 157/70 (03 Jul 2023 13:36) (130/66 - 180/84)  BP(mean): --  ABP: --  ABP(mean): --  RR: 17 (03 Jul 2023 13:36) (17 - 17)  SpO2: 92% (03 Jul 2023 13:36) (91% - 92%)    O2 Parameters below as of 03 Jul 2023 13:36  Patient On (Oxygen Delivery Method): room air           GEN: NAD  HEENT: normocephalic and atraumatic.  NECK: Supple.  LUNGS: Clear to auscultation.  HEART: Regular rate and rhythm   ABDOMEN: Soft, nontender, and nondistended.    EXTREMITIES: No leg edema.  NEUROLOGIC: unable to assess 2/2 dementia    Labs:  07-03    133<L>  |  94<L>  |  67<H>  ----------------------------<  237<H>  4.9   |  30  |  5.80<H>    Ca    9.1      03 Jul 2023 07:30    TPro  7.1  /  Alb  2.8<L>  /  TBili  0.4  /  DBili  x   /  AST  14<L>  /  ALT  17  /  AlkPhos  116  07-03                          11.2   13.94 )-----------( 274      ( 03 Jul 2023 07:30 )             35.4     PT/INR - ( 03 Jul 2023 07:30 )   PT: 12.9 sec;   INR: 1.10 ratio           Urinalysis Basic - ( 03 Jul 2023 07:30 )    Color: x / Appearance: x / SG: x / pH: x  Gluc: 237 mg/dL / Ketone: x  / Bili: x / Urobili: x   Blood: x / Protein: x / Nitrite: x   Leuk Esterase: x / RBC: x / WBC x   Sq Epi: x / Non Sq Epi: x / Bacteria: x      LIVER FUNCTIONS - ( 03 Jul 2023 07:30 )  Alb: 2.8 g/dL / Pro: 7.1 g/dL / ALK PHOS: 116 U/L / ALT: 17 U/L / AST: 14 U/L / GGT: x             RECENT CULTURES:        All imaging and data are reviewed.     CXR   FINDINGS:  CATHETERS AND TUBES: None    PULMONARY: Moderate to large LEFT pleural effusion and/or lower zone   airspace consolidation obscuring diaphragm contour. RIGHT lung parenchyma   clear.  No pneumothorax.    HEART/VASCULAR: The heart is mildly enlarged in transverse diameter.  .    BONES: Visualized osseous thorax intact.    IMPRESSION:   Moderate-large LEFT effusion and/or lower zone airspace   consolidation/atelectasis..

## 2023-07-03 NOTE — PROGRESS NOTE ADULT - SUBJECTIVE AND OBJECTIVE BOX
Patient seen and examined at bedside. Patient is a 72F with a R superior/inferior rami fracture and R comminuted acetabular fracture confirmed by CT scan, with no acute overnight events. No complaints of pain or other orthopedic complaints.     Vital Signs (24 Hrs):  T(C): 37.1 (07-03-23 @ 04:57), Max: 37.1 (07-02-23 @ 20:23)  HR: 84 (07-03-23 @ 05:57) (59 - 86)  BP: 151/71 (07-03-23 @ 05:57) (130/66 - 180/84)  RR: 17 (07-03-23 @ 04:57) (17 - 18)  SpO2: 92% (07-03-23 @ 04:57) (91% - 92%)  Wt(kg): --    LABS:                          11.8   14.36 )-----------( 265      ( 02 Jul 2023 09:01 )             37.0     07-02    132<L>  |  97  |  39<H>  ----------------------------<  188<H>  5.0   |  26  |  4.10<H>    Ca    9.1      02 Jul 2023 09:01    TPro  7.7  /  Alb  3.1<L>  /  TBili  0.4  /  DBili  x   /  AST  25  /  ALT  28  /  AlkPhos  136<H>  07-01    LIVER FUNCTIONS - ( 01 Jul 2023 10:00 )  Alb: 3.1 g/dL / Pro: 7.7 g/dL / ALK PHOS: 136 U/L / ALT: 28 U/L / AST: 25 U/L / GGT: x           PT/INR - ( 01 Jul 2023 10:00 )   PT: 14.9 sec;   INR: 1.27 ratio         PTT - ( 01 Jul 2023 10:00 )  PTT:31.8 sec    Physical  General: NAD    RLE:   superficial abrasion on right knee  No open skin  TTP around R hip/groin, nowhere else throughout RLE  Hip ROM limited due to pain  SILT: +SPN/DPN/Tiffanie/Tib  Motor: +Gsc/TA; EHL/FHL unable to be examined given R big toe partial amputation, able to wiggle remaining toes  SILT: +SPN/DPN/Tiffanie/Saph/Tib  2+ DP pulse  Compartments soft and compressible  Calf non-tender    A/P:  Patient is a 72F with a R superior/inferior rami fracture and R comminuted acetabular fracture confirmed by CT scan.    -Given her medical comorbidities and low ambulatory baseline, nonoperative treatment with non-weightbearing of the RLE is recommended  -NWB of RLE  -PT/OT daily  -c/w Eliquis from the orthopedic standpoint unless otherwise medically contraindicated   -H/H checks due to high bleeding risk of pelvic fracture and Eliquis  -Pain control PRN  -Medical management per primary team  -Dispo pending PT eval  -Follow up with Dr. Narayanan in office in 2 weeks whether she is discharged to rehab or home  -Discussed above with Dr. Narayanan and Dr. Jenkins, who both agree with plan Patient seen and examined at bedside. Patient is a 72F with a R superior/inferior rami fracture and R comminuted acetabular fracture confirmed by CT scan, with no acute overnight events. No complaints of pain or other orthopedic complaints. Denies any numbness or tingling.    Vital Signs (24 Hrs):  T(C): 37.1 (07-03-23 @ 04:57), Max: 37.1 (07-02-23 @ 20:23)  HR: 84 (07-03-23 @ 05:57) (59 - 86)  BP: 151/71 (07-03-23 @ 05:57) (130/66 - 180/84)  RR: 17 (07-03-23 @ 04:57) (17 - 18)  SpO2: 92% (07-03-23 @ 04:57) (91% - 92%)  Wt(kg): --    LABS:                          11.8   14.36 )-----------( 265      ( 02 Jul 2023 09:01 )             37.0     07-02    132<L>  |  97  |  39<H>  ----------------------------<  188<H>  5.0   |  26  |  4.10<H>    Ca    9.1      02 Jul 2023 09:01    TPro  7.7  /  Alb  3.1<L>  /  TBili  0.4  /  DBili  x   /  AST  25  /  ALT  28  /  AlkPhos  136<H>  07-01    LIVER FUNCTIONS - ( 01 Jul 2023 10:00 )  Alb: 3.1 g/dL / Pro: 7.7 g/dL / ALK PHOS: 136 U/L / ALT: 28 U/L / AST: 25 U/L / GGT: x           PT/INR - ( 01 Jul 2023 10:00 )   PT: 14.9 sec;   INR: 1.27 ratio         PTT - ( 01 Jul 2023 10:00 )  PTT:31.8 sec    Physical  General: NAD    RLE:   superficial abrasion on right knee  No open skin  TTP around R hip/groin, nowhere else throughout RLE  Hip ROM limited due to pain  SILT: +SPN/DPN/Tiffanie/Tib  Motor: +Gsc/TA; EHL/FHL unable to be examined given R big toe partial amputation, able to wiggle remaining toes  SILT: +SPN/DPN/Tiffanie/Saph/Tib  2+ DP pulse  Compartments soft and compressible  Calf non-tender    A/P:  Patient is a 72F with a R superior/inferior rami fracture and R comminuted acetabular fracture confirmed by CT scan.    -Given her medical comorbidities and low ambulatory baseline, nonoperative treatment with non-weightbearing of the RLE is recommended  -NWB of RLE  -PT/OT daily  -c/w Eliquis from the orthopedic standpoint unless otherwise medically contraindicated   -H/H checks due to high bleeding risk of pelvic fracture and Eliquis  -Pain control PRN  -Medical management per primary team  -Dispo pending PT eval  -Follow up with Dr. Narayanan in office in 2 weeks whether she is discharged to rehab or home  -Discussed above with Dr. Narayanan and Dr. Jenkins, who both agree with plan Patient seen and examined at bedside. Patient is a 72F with a R superior/inferior rami fracture and R comminuted acetabular fracture confirmed by CT scan, with no acute overnight events. No complaints of pain or other orthopedic complaints. Denies any numbness or tingling.    Vital Signs (24 Hrs):  T(C): 37.1 (07-03-23 @ 04:57), Max: 37.1 (07-02-23 @ 20:23)  HR: 84 (07-03-23 @ 05:57) (59 - 86)  BP: 151/71 (07-03-23 @ 05:57) (130/66 - 180/84)  RR: 17 (07-03-23 @ 04:57) (17 - 18)  SpO2: 92% (07-03-23 @ 04:57) (91% - 92%)  Wt(kg): --    LABS:                          11.8   14.36 )-----------( 265      ( 02 Jul 2023 09:01 )             37.0     07-02    132<L>  |  97  |  39<H>  ----------------------------<  188<H>  5.0   |  26  |  4.10<H>    Ca    9.1      02 Jul 2023 09:01    TPro  7.7  /  Alb  3.1<L>  /  TBili  0.4  /  DBili  x   /  AST  25  /  ALT  28  /  AlkPhos  136<H>  07-01    LIVER FUNCTIONS - ( 01 Jul 2023 10:00 )  Alb: 3.1 g/dL / Pro: 7.7 g/dL / ALK PHOS: 136 U/L / ALT: 28 U/L / AST: 25 U/L / GGT: x           PT/INR - ( 01 Jul 2023 10:00 )   PT: 14.9 sec;   INR: 1.27 ratio         PTT - ( 01 Jul 2023 10:00 )  PTT:31.8 sec    Physical  General: NAD    RLE:   superficial abrasion on right knee  No open skin  TTP around R hip/groin, nowhere else throughout RLE  Hip ROM limited due to pain  SILT: +SPN/DPN/Tiffanie/Tib  Motor: +Gsc/TA; EHL/FHL unable to be examined given R big toe partial amputation, able to wiggle remaining toes  SILT: +SPN/DPN/Tiffanie/Saph/Tib  2+ DP pulse  Compartments soft and compressible  Calf non-tender    A/P:  Patient is a 72F with a R superior/inferior rami fracture and R comminuted acetabular fracture confirmed by CT scan.    -Given her medical comorbidities and low ambulatory baseline, nonoperative treatment with non-weightbearing of the RLE is recommended  -NWB of RLE  -PT/OT daily  -c/w Eliquis from the orthopedic standpoint unless otherwise medically contraindicated   -H/H checks due to high bleeding risk of pelvic fracture and Eliquis  -Pain control PRN  -Medical management per primary team  -Dispo pending PT eval  -Follow up with Dr. Narayanan in office in 2 weeks whether she is discharged to rehab or home  -No further orthopedic surgical interventions indicated at this time  -Ortho stable for DC if otherwise medically stable   -Discussed above with Dr. Narayanan and Dr. Jenkins, who both agree with plan

## 2023-07-03 NOTE — CARE COORDINATION ASSESSMENT. - PRIMARY CARE PROVIDER FOR, PROFILE
no one Partially impaired: cannot see medication labels or newsprint, but can see obstacles in path, and the surrounding layout; can count fingers at arm's length

## 2023-07-03 NOTE — PROGRESS NOTE ADULT - SUBJECTIVE AND OBJECTIVE BOX
Patient is a 72y Female with a known history of :  Hip fracture [S72.009A]    Closed pelvic fracture [S32.9XXA]    Pubic ramus fracture [S32.599A]    Right acetabular fracture [S32.401A]    ESRD on dialysis [N18.6]    Prophylactic measure [Z29.9]    HTN (hypertension) [I10]    Fall [W19.XXXA]      HPI:  72-year-old female with significant past medical history for hypertension, diabetes, dementia, end-stage renal disease on hemodialysis presents to the ED status post unwitnessed fall from [facility].  Patient states that she heard some voices then rolled out of bed.  Patient complaining of right hip pain.  Unknown head trauma.  + Eliquis < from: Xray Femur showed  Fractures including the medial wall of the acetabulum and inferior right pubic ramus Admitted for pain management ,PT/OT        (01 Jul 2023 15:24)      REVIEW OF SYSTEMS:    CONSTITUTIONAL: No fever, weight loss, or fatigue  EYES: No eye pain, visual disturbances, or discharge  ENMT:  No difficulty hearing, tinnitus, vertigo; No sinus or throat pain  NECK: No pain or stiffness  BREASTS: No pain, masses, or nipple discharge  RESPIRATORY: No cough, wheezing, chills or hemoptysis; No shortness of breath  CARDIOVASCULAR: No chest pain, palpitations, dizziness, or leg swelling  GASTROINTESTINAL: No abdominal or epigastric pain. No nausea, vomiting, or hematemesis; No diarrhea or constipation. No melena or hematochezia.  GENITOURINARY: No dysuria, frequency, hematuria, or incontinence  NEUROLOGICAL: No headaches, memory loss, loss of strength, numbness, or tremors  SKIN: No itching, burning, rashes, or lesions   LYMPH NODES: No enlarged glands  ENDOCRINE: No heat or cold intolerance; No hair loss  MUSCULOSKELETAL: No joint pain or swelling; No muscle, back, or extremity pain  PSYCHIATRIC: No depression, anxiety, mood swings, or difficulty sleeping  HEME/LYMPH: No easy bruising, or bleeding gums  ALLERGY AND IMMUNOLOGIC: No hives or eczema    MEDICATIONS  (STANDING):  acetaminophen     Tablet .. 650 milliGRAM(s) Oral every 6 hours  aspirin enteric coated 81 milliGRAM(s) Oral daily  cloNIDine 0.1 milliGRAM(s) Oral two times a day  dextrose 5%. 1000 milliLiter(s) (100 mL/Hr) IV Continuous <Continuous>  dextrose 5%. 1000 milliLiter(s) (50 mL/Hr) IV Continuous <Continuous>  dextrose 50% Injectable 12.5 Gram(s) IV Push once  dextrose 50% Injectable 25 Gram(s) IV Push once  dextrose 50% Injectable 25 Gram(s) IV Push once  diltiazem    Tablet 60 milliGRAM(s) Oral three times a day  dronabinol 2.5 milliGRAM(s) Oral two times a day before meals  glucagon  Injectable 1 milliGRAM(s) IntraMuscular once  imipramine 50 milliGRAM(s) Oral daily  insulin lispro (ADMELOG) corrective regimen sliding scale   SubCutaneous every 6 hours  lidocaine   4% Patch 1 Patch Transdermal daily  metoprolol tartrate 100 milliGRAM(s) Oral two times a day  mirtazapine 7.5 milliGRAM(s) Oral at bedtime  multivitamin 1 Tablet(s) Oral daily  pantoprazole    Tablet 40 milliGRAM(s) Oral before breakfast  polyethylene glycol 3350 17 Gram(s) Oral daily  senna 2 Tablet(s) Oral at bedtime    MEDICATIONS  (PRN):  dextrose Oral Gel 15 Gram(s) Oral once PRN Blood Glucose LESS THAN 70 milliGRAM(s)/deciliter  morphine  - Injectable 2 milliGRAM(s) IV Push every 6 hours PRN Severe Pain (7 - 10)  traMADol 25 milliGRAM(s) Oral every 6 hours PRN Moderate Pain (4 - 6)  traMADol 50 milliGRAM(s) Oral every 8 hours PRN Severe Pain (7 - 10)      ALLERGIES: No Known Drug Allergies  latex (Hives)      FAMILY HISTORY:  FH: myocardial infarction (Father)    FH: myocardial infarction (Father)    Family history of type 2 diabetes mellitus in brother (Sibling)        PHYSICAL EXAMINATION:  -----------------------------  T(C): 37.1 (07-03-23 @ 04:57), Max: 37.1 (07-02-23 @ 20:23)  HR: 84 (07-03-23 @ 05:57) (59 - 86)  BP: 151/71 (07-03-23 @ 05:57) (130/66 - 180/84)  RR: 17 (07-03-23 @ 04:57) (17 - 18)  SpO2: 92% (07-03-23 @ 04:57) (91% - 92%)  Wt(kg): --        VITALS  T(C): 37.1 (07-03-23 @ 04:57), Max: 37.1 (07-02-23 @ 20:23)  HR: 84 (07-03-23 @ 05:57) (59 - 86)  BP: 151/71 (07-03-23 @ 05:57) (130/66 - 180/84)  RR: 17 (07-03-23 @ 04:57) (17 - 18)  SpO2: 92% (07-03-23 @ 04:57) (91% - 92%)    Constitutional: well developed, normal appearance, well groomed, well nourished, no deformities and no acute distress.   Eyes: the conjunctiva exhibited no abnormalities and the eyelids demonstrated no xanthelasmas.   HEENT: normal oral mucosa, no oral pallor and no oral cyanosis.   Neck: normal jugular venous A waves present, normal jugular venous V waves present and no jugular venous wilson A waves.   Pulmonary: no respiratory distress, normal respiratory rhythm and effort, no accessory muscle use and lungs were clear to auscultation bilaterally.   Cardiovascular: heart rate and rhythm were normal, normal S1 and S2 and no murmur, gallop, rub, heave or thrill are present.   Abdomen: soft, non-tender, no hepato-splenomegaly and no abdominal mass palpated.   Musculoskeletal: the gait could not be assessed..   Extremities: no clubbing of the fingernails, no localized cyanosis, no petechial hemorrhages and no ischemic changes.   Skin: normal skin color and pigmentation, no rash, no venous stasis, no skin lesions, no skin ulcer and no xanthoma was observed.   Psychiatric: oriented to person, place, and time, the affect was normal, the mood was normal and not feeling anxious.     LABS:   --------  07-02    132<L>  |  97  |  39<H>  ----------------------------<  188<H>  5.0   |  26  |  4.10<H>    Ca    9.1      02 Jul 2023 09:01    TPro  7.7  /  Alb  3.1<L>  /  TBili  0.4  /  DBili  x   /  AST  25  /  ALT  28  /  AlkPhos  136<H>  07-01                         11.2   13.94 )-----------( 274      ( 03 Jul 2023 07:30 )             35.4     PT/INR - ( 03 Jul 2023 07:30 )   PT: 12.9 sec;   INR: 1.10 ratio         PTT - ( 01 Jul 2023 10:00 )  PTT:31.8 sec            RADIOLOGY:  -----------------    ECG:     ECHO:

## 2023-07-03 NOTE — CARE COORDINATION ASSESSMENT. - NSCAREPROVIDERS_GEN_ALL_CORE_FT
CARE PROVIDERS:  Accepting Physician: Diamond Larson  Administration: Asael Hernandez  Administration: Hira Lacy  Administration: Roxana Cornejo  Administration: Alda De La Torre  Admitting: Diamond Larson  Attending: Diamond Larson  Consultant: Art Landeros  Consultant: Joseluis James  Consultant: Lius Moore  Consultant: Shyam Sanchez  Consultant: Parul Napier  Consultant: Weil, Patricia  Consultant: Lupe Tsai  Consultant: Bob Sanders  Consultant: Tony Thompson  Consultant: Taylor Chanel  Covering Team: Agus Gilman  ED Attending: Kyra Dias  ED Nurse: Kaye Reyes  ED Nurse 2: Raji Conroy  HIM/Billing & Coding: Leanne Durbin  Nurse: Leanne Main  Nurse: Romulo Bean  Nurse: Serenity Cash  Ordered: Pahlavan, Mohsen  Ordered: ADM, User  Ordered: Doctor, Unknown  Outpatient Provider: Pahlavan, Mohsen  Outpatient Provider: Pahlavan, Mohsen  Outpatient Provider: Dev Shepherd  Outpatient Provider: Diamond Larson  Override: Yadira Menjivar  PCA/Nursing Assistant: Katiana Queen  PCA/Nursing Assistant: Yadira Menjivar  Primary Team: Nirav Pierre  Primary Team: Maryan Camejo  Quality Review: Lise Mandel  Registered Dietitian: Ilana Salas  : Nichole Sullivan  : Aissatou Harper

## 2023-07-03 NOTE — PROGRESS NOTE ADULT - SUBJECTIVE AND OBJECTIVE BOX
CHIEF COMPLAINT/ REASON FOR VISIT  .. Patient was seen to address the  issue listed under PROBLEM LIST which is located toward bottom of this note     SHILO KOHLER    [id number] [id number] D1    Allergies    No Known Drug Allergies  latex (Hives)    Intolerances        PAST MEDICAL & SURGICAL HISTORY:  HTN (hypertension)      h/o Anxiety attack      Depression      h/o Myocardial infarct 2007      h/o Hepatitis A 1969  currently resolved, no symptoms      Murmur, cardiac      h/o Smoking  quitted 3/2012      Anemia secondary to renal failure      ESRD on dialysis      Falls      Ataxia      Type 2 diabetes mellitus      Peripheral vascular disease, unspecified      CAD (coronary artery disease)      Diabetes      coronary stent 2007      s/p Ovarian cyst removal      s/p surgical removal of benign Skin lesion epigastric area      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          FAMILY HISTORY:  FH: myocardial infarction (Father)    FH: myocardial infarction (Father)    Family history of type 2 diabetes mellitus in brother (Sibling)        Home Medications:  acetaminophen 325 mg oral tablet: 2 tab(s) orally every 6 hours as needed (02 Jul 2023 15:24)  Admelog SoloStar 100 units/mL injectable solution: injectable 3 times a day (before meals)sliding scale  (02 Jul 2023 15:24)  apixaban 2.5 mg oral tablet: 1 tab(s) orally 2 times a day (02 Jul 2023 15:24)  aspirin 81 mg oral delayed release tablet: 1 tab(s) orally once a day (at bedtime) (02 Jul 2023 15:24)  atorvastatin 20 mg oral tablet: 1 tab(s) orally once a day (at bedtime) (02 Jul 2023 15:24)  bacitracin 500 units/g topical ointment: Apply topically to affected area once a day to right knee abrasion (02 Jul 2023 11:54)  Basaglar KwikPen 100 units/mL subcutaneous solution: 8 international unit(s) subcutaneous once a day (at bedtime) (02 Jul 2023 15:24)  cloNIDine 0.1 mg oral tablet: 1 tab(s) orally 2 times a day (02 Jul 2023 15:24)  DilTIAZem (Eqv-Cardizem CD) 180 mg/24 hours oral capsule, extended release: 1 cap(s) orally once a day (02 Jul 2023 15:24)  imipramine 50 mg oral tablet: 1 tab(s) orally once a day (in the evening) (02 Jul 2023 15:24)  metoprolol tartrate 100 mg oral tablet: 1 tab(s) orally 2 times a day (02 Jul 2023 15:24)  mirtazapine 7.5 mg oral tablet: 1 tab(s) orally once a day (at bedtime) (02 Jul 2023 15:24)  Nephro-Alfie oral tablet: 1 tab(s) orally once a day (02 Jul 2023 15:24)  PANTOPRAZOLE 40MG DR TAB: 1 tab(s) orally once a day (02 Jul 2023 15:24)  senna leaf extract oral tablet: 2 tab(s) orally once a day (at bedtime) (02 Jul 2023 15:24)      MEDICATIONS  (STANDING):  acetaminophen     Tablet .. 650 milliGRAM(s) Oral every 6 hours  aspirin enteric coated 81 milliGRAM(s) Oral daily  cloNIDine 0.1 milliGRAM(s) Oral two times a day  dextrose 5%. 1000 milliLiter(s) (100 mL/Hr) IV Continuous <Continuous>  dextrose 5%. 1000 milliLiter(s) (50 mL/Hr) IV Continuous <Continuous>  dextrose 50% Injectable 12.5 Gram(s) IV Push once  dextrose 50% Injectable 25 Gram(s) IV Push once  dextrose 50% Injectable 25 Gram(s) IV Push once  diltiazem    Tablet 60 milliGRAM(s) Oral three times a day  dronabinol 2.5 milliGRAM(s) Oral two times a day before meals  glucagon  Injectable 1 milliGRAM(s) IntraMuscular once  imipramine 50 milliGRAM(s) Oral daily  insulin lispro (ADMELOG) corrective regimen sliding scale   SubCutaneous every 6 hours  lidocaine   4% Patch 1 Patch Transdermal daily  metoprolol tartrate 100 milliGRAM(s) Oral two times a day  mirtazapine 7.5 milliGRAM(s) Oral at bedtime  multivitamin 1 Tablet(s) Oral daily  pantoprazole    Tablet 40 milliGRAM(s) Oral before breakfast  polyethylene glycol 3350 17 Gram(s) Oral daily  senna 2 Tablet(s) Oral at bedtime    MEDICATIONS  (PRN):  dextrose Oral Gel 15 Gram(s) Oral once PRN Blood Glucose LESS THAN 70 milliGRAM(s)/deciliter  morphine  - Injectable 2 milliGRAM(s) IV Push every 6 hours PRN Severe Pain (7 - 10)  traMADol 50 milliGRAM(s) Oral every 8 hours PRN Severe Pain (7 - 10)  traMADol 25 milliGRAM(s) Oral every 6 hours PRN Moderate Pain (4 - 6)      Diet, NPO after Midnight:      NPO Start Date: 02-Jul-2023,   NPO Start Time: 23:59 (07-02-23 @ 14:13) [Active]          Vital Signs Last 24 Hrs  T(C): 37.1 (03 Jul 2023 04:57), Max: 37.1 (02 Jul 2023 20:23)  T(F): 98.8 (03 Jul 2023 04:57), Max: 98.8 (03 Jul 2023 04:57)  HR: 86 (03 Jul 2023 04:57) (59 - 86)  BP: 180/84 (03 Jul 2023 04:57) (130/66 - 180/84)  BP(mean): --  RR: 17 (03 Jul 2023 04:57) (17 - 18)  SpO2: 92% (03 Jul 2023 04:57) (91% - 92%)    Parameters below as of 03 Jul 2023 04:57  Patient On (Oxygen Delivery Method): room air          07-01-23 @ 07:01  -  07-02-23 @ 07:00  --------------------------------------------------------  IN: 0 mL / OUT: 1900 mL / NET: -1900 mL              LABS:                        11.8   14.36 )-----------( 265      ( 02 Jul 2023 09:01 )             37.0     07-02    132<L>  |  97  |  39<H>  ----------------------------<  188<H>  5.0   |  26  |  4.10<H>    Ca    9.1      02 Jul 2023 09:01    TPro  7.7  /  Alb  3.1<L>  /  TBili  0.4  /  DBili  x   /  AST  25  /  ALT  28  /  AlkPhos  136<H>  07-01    PT/INR - ( 01 Jul 2023 10:00 )   PT: 14.9 sec;   INR: 1.27 ratio         PTT - ( 01 Jul 2023 10:00 )  PTT:31.8 sec  Urinalysis Basic - ( 02 Jul 2023 09:01 )    Color: x / Appearance: x / SG: x / pH: x  Gluc: 188 mg/dL / Ketone: x  / Bili: x / Urobili: x   Blood: x / Protein: x / Nitrite: x   Leuk Esterase: x / RBC: x / WBC x   Sq Epi: x / Non Sq Epi: x / Bacteria: x            WBC:  WBC Count: 14.36 K/uL (07-02 @ 09:01)  WBC Count: 15.57 K/uL (07-01 @ 10:00)      MICROBIOLOGY:  RECENT CULTURES:              PT/INR - ( 01 Jul 2023 10:00 )   PT: 14.9 sec;   INR: 1.27 ratio         PTT - ( 01 Jul 2023 10:00 )  PTT:31.8 sec    Sodium:  Sodium: 132 mmol/L (07-02 @ 09:01)  Sodium: 135 mmol/L (07-01 @ 10:00)      4.10 mg/dL 07-02 @ 09:01  5.00 mg/dL 07-01 @ 10:00      Hemoglobin:  Hemoglobin: 11.8 g/dL (07-02 @ 09:01)  Hemoglobin: 13.1 g/dL (07-01 @ 10:00)      Platelets: Platelet Count - Automated: 265 K/uL (07-02 @ 09:01)  Platelet Count - Automated: 281 K/uL (07-01 @ 10:00)      LIVER FUNCTIONS - ( 01 Jul 2023 10:00 )  Alb: 3.1 g/dL / Pro: 7.7 g/dL / ALK PHOS: 136 U/L / ALT: 28 U/L / AST: 25 U/L / GGT: x             Urinalysis Basic - ( 02 Jul 2023 09:01 )    Color: x / Appearance: x / SG: x / pH: x  Gluc: 188 mg/dL / Ketone: x  / Bili: x / Urobili: x   Blood: x / Protein: x / Nitrite: x   Leuk Esterase: x / RBC: x / WBC x   Sq Epi: x / Non Sq Epi: x / Bacteria: x        RADIOLOGY & ADDITIONAL STUDIES:      MICROBIOLOGY:  RECENT CULTURES:

## 2023-07-03 NOTE — PROGRESS NOTE ADULT - ASSESSMENT
REASON FOR VISIT  .. Management of problems listed below      NOTEWORTHY FINDINGS.     PHYSICAL EXAM    HEENT Unremarkable  atraumatic   RESP Fair air entry  Harsh breath sound   CARDIAC S1 S2 No S3     NO JVD    ABDOMEN No hepatosplenomegaly   PEDAL EDEMA present No calf tenderness  NO rash       GENERAL DATA .     GOC.      ICU STAY.   .. none  COVID.   ..      BEST PRACTICE ISSUES.    HOB ELEVATN.   .. Yes  DVT PPLX.   .. 7/3/2023 apixa 2.5 x2 restarted as IR feels we should tap fluid only if she develops shortness of breath  ..    7/2/2023 Apixaba 2.5 x2 held for thorac   STRESS ULCER PPLX.   ..      INFECTION PPLX.   ..    SP SW AMINAH.    ..        DIET.    ..  7/1/2023 renal restrns   IV fl.  ..      PROCEDURES.  ..   PAST PROCEDURES.    ..     GENERAL DATA .     ALLGY.  ..     latex                     WT.  ..  7/1/2023 54  BMI.  ..    7/1/2023 17     ABGS.    VS/ PO/IO/ VENT/ DRIPS.   7/3/2023 afeb 84 150/70   7/3/2023 ra 91%     CC/DOA.        . 7/1/2023 [facility]itness[facility] out of bed. Pt thinks she hit head and c/o pelvis pain.       .  PRESENTATION.   . 7/1/2023 72-year-old female former smoker quit 3/2012  with significant past medical history for hypertension, CAD sp cabg  PVD  diabetes, dementia, end-stage renal disease on hemodialysis   a fib on eliquis sp recent hosp stay 5/17-5/24/2023  admitted 3/7-3/11/2023 and before that 2/22-2/25/2023   . During 5/2023 admission she had   . ENTERORHINOVIRUS INFECTION RESP TRACT 5/17  . PLEURAL EFFUSION SP l THORA 5/18 TRANSUDATE     . Patient now  presented to the ED 7/1/2023 status post unwitnessed fall from [facility].  Patient states that she heard some voices then rolled out of bed.  Patient complaining of right hip pain.  Unknown head trauma.  + Eliquis     She was found to have acetabular fracture which Ortho was contacted and they plan non operative management Pt was admitted to [facility] for bleed as she was on apixaban and was noted to have pl effsn    . I was asked to admit pt                        .     PMH.   CAD.  .. HISTORY ho cabg  A fib  .. 3/7/2023 Not on ac sec multiple falls  PAD  .. sp R-> L bifem - fem BK pop bypass   .. Fem pop bypass 6/21/2022   .. Partial ray amputation r great toe 6/22/2022   ESRD   .. On HD   HOME MEDS.   . cardizem 60.3 metoprolol 100.2 dronabinol 2.5 x 2 apixaba 2.5 x 2 imipramine 50 clonidine .1 x 2 protonix 40 insulin       PROBLEMS/ASSESSMENT/RECOMMENDATIONS (A/R).  PULMONARY.  . GAS EXCHANGE.  .. A/R.   .. Monitor pulse ox and target po 90-95%    . PLEURAL EFFSN.  .. RELEVANT PAST HISTORU  .. 5/18/2023 l thorac 1.5 l   .. 5/18 pl fl l 13 m 73 p 5 afb sm (-) g 131 l 102/268 (.38) ph 7.6 pr 2.6/6.4 (.4)  .. Fluid transudate  .. WORKUP   .. CXR 7/1/2023 cxr Mod large l pl effs or lower zone airspace consolidation/atelecatsis   .. CT ch 7/1/2023 Ct ch   .... Decreased mod l effs since 5/17/2023   .. EVENTS  .. 7/2/2023 DW pt and dw son consensus is that they want fluid remived   .. 7/3/2023 dw ir plan changed plan is to tap if she becomes symptimatic   .. A/R  .. 7/3/2023 Thora cancelled as pt is comfortable will pan thora if she becomes symptomatic     ID.  .. WORKUP.  .. W 7/1-7/2-7/3/2023 W 15 - 14 - 13  .. A/R   .. Leukocytosis likely sec to stress of hip fx  .. 7/1/2023 Checlk blood and urine cs     CARDIO.  . CAD.  .. 7/1/2023 ASA 81   .. A/R  .. cont rx    . A fib.  .. 7/1/2023 CARDIZEM 60.3   .. 7/3/2023 apixaba 2.5 x2   .. AR  .. Cont rx    HEMAT.  .. WORKUP.  .. Hb 7/1-7/2-7/3/2023 Hb 13 - 11.8 - 11.2   .. INR 7/1/2023    .. A/R  .. As pt was on apixaba will monitorserially     RENAL.  . ESRD.  .. WORKUP  .. NA 7/1/2023    .. K 7/1/2023 K 5.1   .. CR 7/1/2023 CR 5    ORTHO.  . FALL 7/1/2023.  .. CT head C sp 7/1/2023 CT HC (-)     . FRACTURE ACETABULUM r INFERIOR PUBIC RAMUS r 7/1/2023   .. IMAGING.  .. XR Pelvis and r hip 7/1/2023 XR Fractures r acetabulum and inf r pubic ramus   .. 7/1/2023 ct pelvix   .... comminuted r acetabular fx involving r sup and inf pubic rami medial wallacetabulum sup acetabulum and post wall acetabulum   .... small hematoma r pelvic sidewall   .. ORTHO.  .. 7/1/2023 DW Dr Dias She spoke to Ortho Dr Jenkins group and plan is for nonsurgical management  .. A/R  .. Monitor serial Hb ro bleed     ROBLEMS.  . PLEURAL EFFSN  . ESRD  . HIP FRACTURE POA 7/1/2023   .....       PLAN SYNOPSIS.  .  HIP Fx poa 7/1/2023   .... Ortho Dr Jenkins grouprecommends nonsurgical management   .  . PT ON APIXABA ON ADMISSION 7/1/2023   .... Hold apixaba Monitor Hb serially    . Pleural effsn  ....  7/3/2023 dw ir plan to defer tap till pt becomes symptomatic     DISCHARGE  PLANNING.  . ESRD patient Will need HD       TIME SPENT   About 36 minutes aggregate care time spent on encounter; activities included   direct patient care, counseling and/or coordinating care reviewing notes, lab data/ imaging , discussion with multidisciplinary team/ patient  /family and explaining in detail risks, benefits, alternatives  of the recommendations     JOSE F LAO 72 f7/1/2023 1948 DR HARRY ALBRIGHT

## 2023-07-03 NOTE — PROVIDER CONTACT NOTE (OTHER) - SITUATION
Pt had an oral temp of 101. Pt does not have PRN Tylenol ordered because it is ordered standing Q6 hours.

## 2023-07-03 NOTE — PROGRESS NOTE ADULT - ASSESSMENT
72-year-old female with significant past medical history for hypertension, diabetes, dementia, end-stage renal disease on hemodialysis presents to the ED status post unwitnessed fall from [facility].  Patient states that she heard some voices then rolled out of bed.  Patient complaining of right hip pain.  Unknown head trauma.  + Eliquis < from: Xray Femur showed  Fractures including the medial wall of the acetabulum and inferior right pubic ramus Admitted for pain management ,PT/OT

## 2023-07-03 NOTE — DIETITIAN INITIAL EVALUATION ADULT - ORAL INTAKE PTA/DIET HISTORY
patient seen in bed. patient NPO today per CNA. patient confused with history dementia unable to provide history.  RD attempted to contact family with no answer at this time  MOLST no tube feeding in place   patient from [facility] on renal NCS chopped diet thin liquid 1500ml fluid restriction with nepro four times per day and LPS 30 ml daily    education not appropriate from facility

## 2023-07-03 NOTE — PROGRESS NOTE ADULT - SUBJECTIVE AND OBJECTIVE BOX
Patient is a 72y Female whom presented to the hospital with esrd on hd     PAST MEDICAL & SURGICAL HISTORY:  HTN (hypertension)      h/o Anxiety attack      Depression      h/o Myocardial infarct 2007      h/o Hepatitis A 1969  currently resolved, no symptoms      Murmur, cardiac      h/o Smoking  quitted 3/2012      Anemia secondary to renal failure      ESRD on dialysis      Falls      Ataxia      Type 2 diabetes mellitus      Peripheral vascular disease, unspecified      CAD (coronary artery disease)      Diabetes      coronary stent 2007      s/p Ovarian cyst removal      s/p surgical removal of benign Skin lesion epigastric area      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 650 milliGRAM(s) Oral every 6 hours  aspirin enteric coated 81 milliGRAM(s) Oral daily  cloNIDine 0.1 milliGRAM(s) Oral two times a day  dextrose 5%. 1000 milliLiter(s) (50 mL/Hr) IV Continuous <Continuous>  dextrose 5%. 1000 milliLiter(s) (100 mL/Hr) IV Continuous <Continuous>  dextrose 50% Injectable 25 Gram(s) IV Push once  dextrose 50% Injectable 12.5 Gram(s) IV Push once  dextrose 50% Injectable 25 Gram(s) IV Push once  diltiazem    Tablet 60 milliGRAM(s) Oral three times a day  dronabinol 2.5 milliGRAM(s) Oral two times a day before meals  glucagon  Injectable 1 milliGRAM(s) IntraMuscular once  imipramine 50 milliGRAM(s) Oral daily  insulin lispro (ADMELOG) corrective regimen sliding scale   SubCutaneous three times a day before meals  metoprolol tartrate 100 milliGRAM(s) Oral two times a day  mirtazapine 7.5 milliGRAM(s) Oral at bedtime  multivitamin 1 Tablet(s) Oral daily  pantoprazole    Tablet 40 milliGRAM(s) Oral before breakfast  senna 2 Tablet(s) Oral at bedtime  sodium chloride 0.45%. 1000 milliLiter(s) (50 mL/Hr) IV Continuous <Continuous>      Allergies    No Known Drug Allergies  latex (Hives)    Intolerances        SOCIAL HISTORY:  Denies ETOh,Smoking,     FAMILY HISTORY:  FH: myocardial infarction (Father)    FH: myocardial infarction (Father)    Family history of type 2 diabetes mellitus in brother (Sibling)        REVIEW OF SYSTEMS:    CONSTITUTIONAL: No weakness, fevers or chills  RESPIRATORY: No cough, wheezing, hemoptysis; No shortness of breath  CARDIOVASCULAR: No chest pain or palpitations  GASTROINTESTINAL: No abdominal or epigastric pain. No nausea, vomiting,     No diarrhea or constipation. No melena   SKIN: dry                            11.2   13.94 )-----------( 274      ( 03 Jul 2023 07:30 )             35.4       CBC Full  -  ( 03 Jul 2023 07:30 )  WBC Count : 13.94 K/uL  RBC Count : 3.62 M/uL  Hemoglobin : 11.2 g/dL  Hematocrit : 35.4 %  Platelet Count - Automated : 274 K/uL  Mean Cell Volume : 97.8 fl  Mean Cell Hemoglobin : 30.9 pg  Mean Cell Hemoglobin Concentration : 31.6 gm/dL  Auto Neutrophil # : 10.95 K/uL  Auto Lymphocyte # : 0.77 K/uL  Auto Monocyte # : 1.62 K/uL  Auto Eosinophil # : 0.31 K/uL  Auto Basophil # : 0.07 K/uL  Auto Neutrophil % : 78.6 %  Auto Lymphocyte % : 5.5 %  Auto Monocyte % : 11.6 %  Auto Eosinophil % : 2.2 %  Auto Basophil % : 0.5 %      07-03    133<L>  |  94<L>  |  67<H>  ----------------------------<  237<H>  4.9   |  30  |  5.80<H>    Ca    9.1      03 Jul 2023 07:30    TPro  7.1  /  Alb  2.8<L>  /  TBili  0.4  /  DBili  x   /  AST  14<L>  /  ALT  17  /  AlkPhos  116  07-03      CAPILLARY BLOOD GLUCOSE      POCT Blood Glucose.: 150 mg/dL (03 Jul 2023 17:32)  POCT Blood Glucose.: 203 mg/dL (03 Jul 2023 12:05)  POCT Blood Glucose.: 239 mg/dL (03 Jul 2023 07:46)  POCT Blood Glucose.: 271 mg/dL (02 Jul 2023 21:37)      Vital Signs Last 24 Hrs  T(C): 37.2 (03 Jul 2023 13:36), Max: 37.2 (03 Jul 2023 13:36)  T(F): 98.9 (03 Jul 2023 13:36), Max: 98.9 (03 Jul 2023 13:36)  HR: 82 (03 Jul 2023 18:20) (63 - 86)  BP: 166/57 (03 Jul 2023 18:20) (130/66 - 180/84)  BP(mean): --  RR: 17 (03 Jul 2023 13:36) (17 - 17)  SpO2: 92% (03 Jul 2023 13:36) (91% - 92%)    Parameters below as of 03 Jul 2023 13:36  Patient On (Oxygen Delivery Method): room air        Urinalysis Basic - ( 03 Jul 2023 07:30 )    Color: x / Appearance: x / SG: x / pH: x  Gluc: 237 mg/dL / Ketone: x  / Bili: x / Urobili: x   Blood: x / Protein: x / Nitrite: x   Leuk Esterase: x / RBC: x / WBC x   Sq Epi: x / Non Sq Epi: x / Bacteria: x        PT/INR - ( 03 Jul 2023 07:30 )   PT: 12.9 sec;   INR: 1.10 ratio                     PHYSICAL EXAM:    Constitutional: NAD  HEENT: conjunctive   clear   Neck:  No JVD  Respiratory: CTAB  Cardiovascular: S1 and S2  Gastrointestinal: BS+, soft, NT/ND  Extremities: No peripheral edema  Neurological:  no focal deficits  pos fistula

## 2023-07-03 NOTE — DIETITIAN NUTRITION RISK NOTIFICATION - TREATMENT: THE FOLLOWING DIET HAS BEEN RECOMMENDED
Diet, Minced and Moist:   Consistent Carbohydrate {Evening Snack}  DASH/TLC {Sodium & Cholesterol Restricted}  Mildly Thick Liquids (MILDTHICKLIQS)  For patients receiving Renal Replacement - No Protein Restr, No Conc K, No Conc Phos, Low Sodium (RENAL)  Supplement Feeding Modality:  Oral  Nepro Cans or Servings Per Day:  1       Frequency:  Daily (07-01-23 @ 15:45) [Active]

## 2023-07-03 NOTE — CHART NOTE - NSCHARTNOTEFT_GEN_A_CORE
Called by RN for patient with fever 101F  - as per chart review; this is new fever  - patient seen and examined at bedside; only c/o sacral pain  - denies fever, chills, cp. sob, abd pain, n/v/d   - will give additional dose of PO tylenol for fever  - currently ordered for standing tylenol will change to prn as to not mask any fevers   - blood cx already drawn from few hours ago today; will check UA with urine culture   - CXR ordered; will hold off on antibx for now unless patient spikes another fever   - RN to call if changes

## 2023-07-03 NOTE — PROGRESS NOTE ADULT - ASSESSMENT
fx rt pelvis with hematoma- hold eliquis  s/p fall  ashd - s/p cabg  s/p mi  s/p stent  esrd - on hd  dm2  hypertension  paf  depression  pvd  discussed with spouse 7/1  to have thoracentasis- restart eliquis after thoracentasis 7/3

## 2023-07-03 NOTE — CARE COORDINATION ASSESSMENT. - OTHER PERTINENT REFERRAL INFORMATION
SW attempted to speak with pt son Jeronimo without success; left VM to confirm info. Per previous CCA, pt is a long term resident at [facility]. Pt resides with spouse at [facility]. Pt has a RW and uses O2 PRN. When medically cleared, pt to return back to [facility].

## 2023-07-03 NOTE — DIETITIAN INITIAL EVALUATION ADULT - PERTINENT MEDS FT
MEDICATIONS  (STANDING):  acetaminophen     Tablet .. 650 milliGRAM(s) Oral every 6 hours  apixaban 2.5 milliGRAM(s) Oral two times a day  aspirin enteric coated 81 milliGRAM(s) Oral daily  cloNIDine 0.1 milliGRAM(s) Oral two times a day  dextrose 5%. 1000 milliLiter(s) (100 mL/Hr) IV Continuous <Continuous>  dextrose 5%. 1000 milliLiter(s) (50 mL/Hr) IV Continuous <Continuous>  dextrose 50% Injectable 12.5 Gram(s) IV Push once  dextrose 50% Injectable 25 Gram(s) IV Push once  dextrose 50% Injectable 25 Gram(s) IV Push once  diltiazem    Tablet 60 milliGRAM(s) Oral three times a day  dronabinol 2.5 milliGRAM(s) Oral two times a day before meals  glucagon  Injectable 1 milliGRAM(s) IntraMuscular once  imipramine 50 milliGRAM(s) Oral daily  insulin lispro (ADMELOG) corrective regimen sliding scale   SubCutaneous every 6 hours  lidocaine   4% Patch 1 Patch Transdermal daily  metoprolol tartrate 100 milliGRAM(s) Oral two times a day  mirtazapine 7.5 milliGRAM(s) Oral at bedtime  multivitamin 1 Tablet(s) Oral daily  pantoprazole    Tablet 40 milliGRAM(s) Oral before breakfast  polyethylene glycol 3350 17 Gram(s) Oral daily  senna 2 Tablet(s) Oral at bedtime    MEDICATIONS  (PRN):  dextrose Oral Gel 15 Gram(s) Oral once PRN Blood Glucose LESS THAN 70 milliGRAM(s)/deciliter  morphine  - Injectable 2 milliGRAM(s) IV Push every 6 hours PRN Severe Pain (7 - 10)  traMADol 50 milliGRAM(s) Oral every 8 hours PRN Severe Pain (7 - 10)  traMADol 25 milliGRAM(s) Oral every 6 hours PRN Moderate Pain (4 - 6)

## 2023-07-03 NOTE — PROGRESS NOTE ADULT - ASSESSMENT
. 7/1/2023 72-year-old female former smoker quit 3/2012  with significant past medical history for hypertension, CAD sp cabg  PVD  diabetes, dementia, end-stage renal disease on hemodialysis   a fib on eliquis sp recent hosp stay 5/17-5/24/2023  admitted 3/7-3/11/2023 and before that 2/22-2/25/2023   . During 5/2023 admission she had   . ENTERORHINOVIRUS INFECTION RESP TRACT 5/17  . PLEURAL EFFUSION SP l THORA 5/18 TRANSUDATE     . Patient now  presented to the ED 7/1/2023 status post unwitnessed fall from [facility].  Patient states that she heard some voices then rolled out of bed.  Patient complaining of right hip pain.  Unknown head trauma.  + Eliquis     She was found to have acetabular fracture which Ortho was contacted and they plan non operative management Pt was admitted to [facility] for bleed as she was on apixaban and was noted to have pl effsn    . 7/1/2023  I was asked to admit pt                      (01 Jul 2023 13:32)      esrd on hd tues thurs sat     ANEMIA PLAN:  Anemia of chronic disease:  Well controlled by Aranesp  H and H subtherapeutic .  We will continue Aranesp aiming for a HCT of 32-36 %.   We will monitor Iron stores, B12 and RBC folate .      BP monitoring,continue current antihypertensive meds, low salt diet,followup with PMD in 1-2 weeks      Accuchecks monitoring and insulin sliding scale coverage, no concentrated sweets diet, serial labs and f/up with PMD, monitor HB A 1 C every 3-4 mnth

## 2023-07-03 NOTE — DIETITIAN INITIAL EVALUATION ADULT - ADD RECOMMEND
1. recommend change to renal MVI 2. recommend minced and moist consistent cho renal diet with nepro daily 3. consider speech evaluation currently on mild thick . thin liquids PTA

## 2023-07-03 NOTE — SOCIAL WORK PROGRESS NOTE - NSSWPROGRESSNOTE_GEN_ALL_CORE
SW attempted to speak with pt's spouse and son to review CCA questions without success; left VM for spouse/son and is awaiting returned call.  SW attempted to speak with pt's spouse and son to review CCA questions without success; pt has hx of dementia. SW left VM for spouse/son and is awaiting returned call.

## 2023-07-03 NOTE — DIETITIAN INITIAL EVALUATION ADULT - PERTINENT LABORATORY DATA
07-03    133<L>  |  94<L>  |  67<H>  ----------------------------<  237<H>  4.9   |  30  |  5.80<H>    Ca    9.1      03 Jul 2023 07:30    TPro  7.1  /  Alb  2.8<L>  /  TBili  0.4  /  DBili  x   /  AST  14<L>  /  ALT  17  /  AlkPhos  116  07-03  POCT Blood Glucose.: 203 mg/dL (07-03-23 @ 12:05)  A1C with Estimated Average Glucose Result: 7.1 % (05-18-23 @ 06:30)  A1C with Estimated Average Glucose Result: 6.8 % (01-31-23 @ 13:48)  A1C with Estimated Average Glucose Result: 9.3 % (09-13-22 @ 07:28)

## 2023-07-03 NOTE — PROGRESS NOTE ADULT - ASSESSMENT
71YO F PMH hypertension, diabetes, dementia, end-stage renal disease on hemodialysis, who presented status post unwitnessed fall from [facility]- rolled out of bed. Patient complained of right hip pain--found to have fractures including the medial wall of the acetabulum and inferior right pubic ramus.    Patient with leukocytosis, WBC 13.9, likely reactive 2/2 hip fracture. No fevers and stable off antibiotics, no obvious signs of infection. CXR shows moderate-large L pleural effusion--although on room air and no obvious respiratory symptoms.    -observe off antibiotics  -check blood cultures   -aspiration precautions  -discussed with Dr. Marsha Tsai MD  Division of infectious Diseases  Cell [phone] between 8am and 6pm  After 6pm and over the weekends please call ID service line at [phone].

## 2023-07-03 NOTE — DIETITIAN INITIAL EVALUATION ADULT - DIET TYPE
minced and moist/supplement (specify)/consistent carbohydrate renal with evening snacks/mildly thick liquids

## 2023-07-03 NOTE — PROGRESS NOTE ADULT - SUBJECTIVE AND OBJECTIVE BOX
PROGRESS NOTE  Patient is a 72y old  Female who presents with a chief complaint of s/p fall (03 Jul 2023 15:19)    Chart and available morning labs /imaging are reviewed electronically , urgent issues addressed . More information  is being added upon completion of rounds , when more information is collected and management discussed with consultants , medical staff and social service/case management on the floor   OVERNIGHT  No new issues reported by medical staff . All above noted Patient is resting in a bed comfortably .Confused ,poor mentation .No distress noted     HPI:  72-year-old female with significant past medical history for hypertension, diabetes, dementia, end-stage renal disease on hemodialysis presents to the ED status post unwitnessed fall from [facility].  Patient states that she heard some voices then rolled out of bed.  Patient complaining of right hip pain.  Unknown head trauma.  + Eliquis < from: Xray Femur showed  Fractures including the medial wall of the acetabulum and inferior right pubic ramus Admitted for pain management ,PT/OT        (01 Jul 2023 15:24)    PAST MEDICAL & SURGICAL HISTORY:  HTN (hypertension)      h/o Anxiety attack      Depression      h/o Myocardial infarct 2007      h/o Hepatitis A 1969  currently resolved, no symptoms      Murmur, cardiac      h/o Smoking  quitted 3/2012      Anemia secondary to renal failure      ESRD on dialysis      Falls      Ataxia      Type 2 diabetes mellitus      Peripheral vascular disease, unspecified      CAD (coronary artery disease)      Diabetes      coronary stent 2007      s/p Ovarian cyst removal      s/p surgical removal of benign Skin lesion epigastric area      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 650 milliGRAM(s) Oral every 6 hours  apixaban 2.5 milliGRAM(s) Oral two times a day  aspirin enteric coated 81 milliGRAM(s) Oral daily  cloNIDine 0.1 milliGRAM(s) Oral two times a day  dextrose 5%. 1000 milliLiter(s) (50 mL/Hr) IV Continuous <Continuous>  dextrose 5%. 1000 milliLiter(s) (100 mL/Hr) IV Continuous <Continuous>  dextrose 50% Injectable 12.5 Gram(s) IV Push once  dextrose 50% Injectable 25 Gram(s) IV Push once  dextrose 50% Injectable 25 Gram(s) IV Push once  diltiazem    Tablet 60 milliGRAM(s) Oral three times a day  dronabinol 2.5 milliGRAM(s) Oral two times a day before meals  glucagon  Injectable 1 milliGRAM(s) IntraMuscular once  imipramine 50 milliGRAM(s) Oral daily  insulin lispro (ADMELOG) corrective regimen sliding scale   SubCutaneous every 6 hours  lidocaine   4% Patch 1 Patch Transdermal daily  metoprolol tartrate 100 milliGRAM(s) Oral two times a day  mirtazapine 7.5 milliGRAM(s) Oral at bedtime  multivitamin 1 Tablet(s) Oral daily  pantoprazole    Tablet 40 milliGRAM(s) Oral before breakfast  polyethylene glycol 3350 17 Gram(s) Oral daily  senna 2 Tablet(s) Oral at bedtime    MEDICATIONS  (PRN):  dextrose Oral Gel 15 Gram(s) Oral once PRN Blood Glucose LESS THAN 70 milliGRAM(s)/deciliter  morphine  - Injectable 2 milliGRAM(s) IV Push every 6 hours PRN Severe Pain (7 - 10)  traMADol 25 milliGRAM(s) Oral every 6 hours PRN Moderate Pain (4 - 6)  traMADol 50 milliGRAM(s) Oral every 8 hours PRN Severe Pain (7 - 10)      OBJECTIVE    T(C): 37.2 (07-03-23 @ 13:36), Max: 37.2 (07-03-23 @ 13:36)  HR: 66 (07-03-23 @ 13:36) (63 - 86)  BP: 157/70 (07-03-23 @ 13:36) (130/66 - 180/84)  RR: 17 (07-03-23 @ 13:36) (17 - 17)  SpO2: 92% (07-03-23 @ 13:36) (91% - 92%)  Wt(kg): --  I&O's Summary        REVIEW OF SYSTEMS:  Patient is  unable to provide any information/ROS  due to baseline mental status.     PHYSICAL EXAM:  Appearance: NAD. VS past 24 hrs -as above   HEENT:   Moist oral mucosa. Conjunctiva clear b/l.   Neck : supple  Respiratory: Lungs CTAB.  Gastrointestinal:  Soft, nontender. No rebound. No rigidity. BS present	  Cardiovascular: RRR ,S1S2 present  Neurologic: Non-focal. Moving all extremities.  Extremities: No edema. No erythema. No calf tenderness.  Skin: No rashes, No ecchymoses, No cyanosis.	  wounds ,skin lesions-See skin assesment flow sheet   LABS:                        11.2   13.94 )-----------( 274      ( 03 Jul 2023 07:30 )             35.4     07-03    133<L>  |  94<L>  |  67<H>  ----------------------------<  237<H>  4.9   |  30  |  5.80<H>    Ca    9.1      03 Jul 2023 07:30    TPro  7.1  /  Alb  2.8<L>  /  TBili  0.4  /  DBili  x   /  AST  14<L>  /  ALT  17  /  AlkPhos  116  07-03    CAPILLARY BLOOD GLUCOSE      POCT Blood Glucose.: 150 mg/dL (03 Jul 2023 17:32)  POCT Blood Glucose.: 203 mg/dL (03 Jul 2023 12:05)  POCT Blood Glucose.: 239 mg/dL (03 Jul 2023 07:46)  POCT Blood Glucose.: 271 mg/dL (02 Jul 2023 21:37)    PT/INR - ( 03 Jul 2023 07:30 )   PT: 12.9 sec;   INR: 1.10 ratio           Urinalysis Basic - ( 03 Jul 2023 07:30 )    Color: x / Appearance: x / SG: x / pH: x  Gluc: 237 mg/dL / Ketone: x  / Bili: x / Urobili: x   Blood: x / Protein: x / Nitrite: x   Leuk Esterase: x / RBC: x / WBC x   Sq Epi: x / Non Sq Epi: x / Bacteria: x        RADIOLOGY & ADDITIONAL TESTS:   reviewed elctronically  ASSESSMENT/PLAN: 	  25 minutes aggregate time was spent on this visit, 50% visit time spent in care co-ordination with other attendings and counselling patient .I have discussed care plan with patient / HCP/family member ,who expressed understanding of problems treatment and their effect and side effects, alternatives in details. I have asked if they have any questions and concerns and appropriately addressed them to best of my ability. ACP-Advance care planning was discussed , pallitaive care issues ,CMO ,GOC ,MOLST  form ,advance directives were reviewed .All questions were answered to the best of my knowledge - 25 min spent.

## 2023-07-04 LAB
APPEARANCE UR: ABNORMAL
BILIRUB UR-MCNC: NEGATIVE — SIGNIFICANT CHANGE UP
COLOR SPEC: SIGNIFICANT CHANGE UP
DIFF PNL FLD: ABNORMAL
FLUAV AG NPH QL: SIGNIFICANT CHANGE UP
FLUBV AG NPH QL: SIGNIFICANT CHANGE UP
GLUCOSE UR QL: NEGATIVE MG/DL — SIGNIFICANT CHANGE UP
KETONES UR-MCNC: NEGATIVE MG/DL — SIGNIFICANT CHANGE UP
LEUKOCYTE ESTERASE UR-ACNC: ABNORMAL
NITRITE UR-MCNC: NEGATIVE — SIGNIFICANT CHANGE UP
PH UR: 7 — SIGNIFICANT CHANGE UP (ref 5–8)
PROT UR-MCNC: 300 MG/DL
RSV RNA NPH QL NAA+NON-PROBE: SIGNIFICANT CHANGE UP
SARS-COV-2 RNA SPEC QL NAA+PROBE: SIGNIFICANT CHANGE UP
SP GR SPEC: 1.01 — SIGNIFICANT CHANGE UP (ref 1–1.03)
UROBILINOGEN FLD QL: 0.2 MG/DL — SIGNIFICANT CHANGE UP (ref 0.2–1)

## 2023-07-04 PROCEDURE — 99233 SBSQ HOSP IP/OBS HIGH 50: CPT

## 2023-07-04 RX ORDER — CEFEPIME 1 G/1
1000 INJECTION, POWDER, FOR SOLUTION INTRAMUSCULAR; INTRAVENOUS EVERY 24 HOURS
Refills: 0 | Status: DISCONTINUED | OUTPATIENT
Start: 2023-07-05 | End: 2023-07-08

## 2023-07-04 RX ORDER — CEFEPIME 1 G/1
1000 INJECTION, POWDER, FOR SOLUTION INTRAMUSCULAR; INTRAVENOUS ONCE
Refills: 0 | Status: COMPLETED | OUTPATIENT
Start: 2023-07-04 | End: 2023-07-04

## 2023-07-04 RX ORDER — VANCOMYCIN HCL 1 G
750 VIAL (EA) INTRAVENOUS ONCE
Refills: 0 | Status: COMPLETED | OUTPATIENT
Start: 2023-07-04 | End: 2023-07-04

## 2023-07-04 RX ORDER — CEFEPIME 1 G/1
INJECTION, POWDER, FOR SOLUTION INTRAMUSCULAR; INTRAVENOUS
Refills: 0 | Status: DISCONTINUED | OUTPATIENT
Start: 2023-07-04 | End: 2023-07-08

## 2023-07-04 RX ADMIN — LIDOCAINE 1 PATCH: 4 CREAM TOPICAL at 19:02

## 2023-07-04 RX ADMIN — Medication 4: at 12:44

## 2023-07-04 RX ADMIN — POLYETHYLENE GLYCOL 3350 17 GRAM(S): 17 POWDER, FOR SOLUTION ORAL at 11:06

## 2023-07-04 RX ADMIN — Medication 60 MILLIGRAM(S): at 05:47

## 2023-07-04 RX ADMIN — Medication 1 TABLET(S): at 11:07

## 2023-07-04 RX ADMIN — LIDOCAINE 1 PATCH: 4 CREAM TOPICAL at 04:20

## 2023-07-04 RX ADMIN — CEFEPIME 100 MILLIGRAM(S): 1 INJECTION, POWDER, FOR SOLUTION INTRAMUSCULAR; INTRAVENOUS at 12:52

## 2023-07-04 RX ADMIN — MIRTAZAPINE 7.5 MILLIGRAM(S): 45 TABLET, ORALLY DISINTEGRATING ORAL at 04:20

## 2023-07-04 RX ADMIN — SENNA PLUS 2 TABLET(S): 8.6 TABLET ORAL at 22:06

## 2023-07-04 RX ADMIN — LIDOCAINE 1 PATCH: 4 CREAM TOPICAL at 23:47

## 2023-07-04 RX ADMIN — Medication 0.1 MILLIGRAM(S): at 05:47

## 2023-07-04 RX ADMIN — Medication 1: at 22:05

## 2023-07-04 RX ADMIN — LIDOCAINE 1 PATCH: 4 CREAM TOPICAL at 11:07

## 2023-07-04 RX ADMIN — Medication 100 MILLIGRAM(S): at 06:48

## 2023-07-04 RX ADMIN — MIRTAZAPINE 7.5 MILLIGRAM(S): 45 TABLET, ORALLY DISINTEGRATING ORAL at 22:06

## 2023-07-04 RX ADMIN — APIXABAN 2.5 MILLIGRAM(S): 2.5 TABLET, FILM COATED ORAL at 05:47

## 2023-07-04 RX ADMIN — PANTOPRAZOLE SODIUM 40 MILLIGRAM(S): 20 TABLET, DELAYED RELEASE ORAL at 05:47

## 2023-07-04 RX ADMIN — Medication 2: at 09:18

## 2023-07-04 RX ADMIN — Medication 60 MILLIGRAM(S): at 22:05

## 2023-07-04 RX ADMIN — Medication 81 MILLIGRAM(S): at 11:06

## 2023-07-04 RX ADMIN — Medication 50 MILLIGRAM(S): at 11:07

## 2023-07-04 RX ADMIN — Medication 2.5 MILLIGRAM(S): at 06:48

## 2023-07-04 RX ADMIN — APIXABAN 2.5 MILLIGRAM(S): 2.5 TABLET, FILM COATED ORAL at 17:21

## 2023-07-04 RX ADMIN — Medication 100 MILLIGRAM(S): at 17:21

## 2023-07-04 RX ADMIN — Medication 250 MILLIGRAM(S): at 17:20

## 2023-07-04 RX ADMIN — Medication 0.1 MILLIGRAM(S): at 17:21

## 2023-07-04 NOTE — PROGRESS NOTE ADULT - ASSESSMENT
72-year-old female with significant past medical history for hypertension, diabetes, dementia, end-stage renal disease on hemodialysis presents to the ED status post unwitnessed fall from [facility].  Patient states that she heard some voices then rolled out of bed.  Patient complaining of right hip pain.  Unknown head trauma.  + Eliquis < from: Xray Femur showed  Fractures including the medial wall of the acetabulum and inferior right pubic ramus Admitted for pain management ,PT/OT

## 2023-07-04 NOTE — PROGRESS NOTE ADULT - SUBJECTIVE AND OBJECTIVE BOX
[facility]  INFECTIOUS DISEASES - [address], [place] [address]  Tel: [phone]     Fax: [phone]  =======================================================    SHILO KOHLER [id number]    Follow up: Fever of 101 overnight. Denies any pain, cough or SOB. Denies any headache, back pain, abdominal pain, nausea or diarrhea.    Allergies:  No Known Drug Allergies  latex (Hives)      Antibiotics:  acetaminophen     Tablet .. 650 milliGRAM(s) Oral every 6 hours PRN  apixaban 2.5 milliGRAM(s) Oral two times a day  aspirin enteric coated 81 milliGRAM(s) Oral daily  cefepime   IVPB      cloNIDine 0.1 milliGRAM(s) Oral two times a day  dextrose 5%. 1000 milliLiter(s) IV Continuous <Continuous>  dextrose 5%. 1000 milliLiter(s) IV Continuous <Continuous>  dextrose 50% Injectable 12.5 Gram(s) IV Push once  dextrose 50% Injectable 25 Gram(s) IV Push once  dextrose 50% Injectable 25 Gram(s) IV Push once  dextrose Oral Gel 15 Gram(s) Oral once PRN  diltiazem    Tablet 60 milliGRAM(s) Oral three times a day  dronabinol 2.5 milliGRAM(s) Oral two times a day before meals  glucagon  Injectable 1 milliGRAM(s) IntraMuscular once  imipramine 50 milliGRAM(s) Oral daily  insulin lispro (ADMELOG) corrective regimen sliding scale   SubCutaneous three times a day before meals  insulin lispro (ADMELOG) corrective regimen sliding scale   SubCutaneous at bedtime  lidocaine   4% Patch 1 Patch Transdermal daily  metoprolol tartrate 100 milliGRAM(s) Oral two times a day  mirtazapine 7.5 milliGRAM(s) Oral at bedtime  morphine  - Injectable 2 milliGRAM(s) IV Push every 6 hours PRN  multivitamin 1 Tablet(s) Oral daily  pantoprazole    Tablet 40 milliGRAM(s) Oral before breakfast  polyethylene glycol 3350 17 Gram(s) Oral daily  senna 2 Tablet(s) Oral at bedtime  traMADol 25 milliGRAM(s) Oral every 6 hours PRN  traMADol 50 milliGRAM(s) Oral every 8 hours PRN  vancomycin  IVPB 750 milliGRAM(s) IV Intermittent once       REVIEW OF SYSTEMS:  limited 2/2 dementia, as per HPI     Physical Exam:  ICU Vital Signs Last 24 Hrs  T(C): 36.8 (2023 05:25), Max: 38.3 (2023 20:51)  T(F): 98.2 (2023 05:25), Max: 101 (2023 20:51)  HR: 80 (2023 05:25) (66 - 85)  BP: 151/69 (2023 05:25) (144/54 - 166/57)  BP(mean): --  ABP: --  ABP(mean): --  RR: 17 (2023 05:25) (17 - 17)  SpO2: 94% (2023 05:25) (91% - 94%)    O2 Parameters below as of 2023 05:25  Patient On (Oxygen Delivery Method): room air           GEN: NAD  HEENT: normocephalic and atraumatic.  NECK: Supple.    LUNGS: Clear to auscultation.  HEART: Regular rate and rhythm   ABDOMEN: Soft, nontender, and nondistended.   EXTREMITIES: No leg edema.  NEUROLOGIC: AO x 2  SKIN: stage 2 sacral ulcer    Labs:      133<L>  |  94<L>  |  67<H>  ----------------------------<  237<H>  4.9   |  30  |  5.80<H>    Ca    9.1      2023 07:30    TPro  7.1  /  Alb  2.8<L>  /  TBili  0.4  /  DBili  x   /  AST  14<L>  /  ALT  17  /  AlkPhos  116                            11.2   13.94 )-----------( 274      ( 2023 07:30 )             35.4     PT/INR - ( 2023 07:30 )   PT: 12.9 sec;   INR: 1.10 ratio           Urinalysis Basic - ( 2023 21:20 )    Color: Dark Yellow / Appearance: Turbid / S.015 / pH: x  Gluc: x / Ketone: Negative mg/dL  / Bili: Negative / Urobili: 0.2 mg/dL   Blood: x / Protein: 300 mg/dL / Nitrite: Negative   Leuk Esterase: Large / RBC: 12 /HPF / WBC 5 /HPF   Sq Epi: x / Non Sq Epi: x / Bacteria: Too Numerous to count /HPF      LIVER FUNCTIONS - ( 2023 07:30 )  Alb: 2.8 g/dL / Pro: 7.1 g/dL / ALK PHOS: 116 U/L / ALT: 17 U/L / AST: 14 U/L / GGT: x             RECENT CULTURES:        All imaging and data are reviewed.     CXR:  FINDINGS/  IMPRESSION:    HEART:difficult to access in this projection  LUNGS: Left effusion/infiltrate, similar to prior.  BONES: degenerative changes

## 2023-07-04 NOTE — PROGRESS NOTE ADULT - SUBJECTIVE AND OBJECTIVE BOX
PROGRESS NOTE  Patient is a 72y old  Female who presents with a chief complaint of s/p fall (2023 12:26)    Chart and available morning labs /imaging are reviewed electronically , urgent issues addressed . More information  is being added upon completion of rounds , when more information is collected and management discussed with consultants , medical staff and social service/case management on the floor   OVERNIGHT    No new issues reported by medical staff . All above noted Patient is resting in a bed comfortably . .No distress noted Planned for thoracocenthesis in am  HPI:  72-year-old female with significant past medical history for hypertension, diabetes, dementia, end-stage renal disease on hemodialysis presents to the ED status post unwitnessed fall from [facility].  Patient states that she heard some voices then rolled out of bed.  Patient complaining of right hip pain.  Unknown head trauma.  + Eliquis < from: Xray Femur showed  Fractures including the medial wall of the acetabulum and inferior right pubic ramus Admitted for pain management ,PT/OT        (2023 15:24)    PAST MEDICAL & SURGICAL HISTORY:  HTN (hypertension)      h/o Anxiety attack      Depression      h/o Myocardial infarct       h/o Hepatitis A 1969  currently resolved, no symptoms      Murmur, cardiac      h/o Smoking  quitted 3/2012      Anemia secondary to renal failure      ESRD on dialysis      Falls      Ataxia      Type 2 diabetes mellitus      Peripheral vascular disease, unspecified      CAD (coronary artery disease)      Diabetes      coronary stent       s/p Ovarian cyst removal      s/p surgical removal of benign Skin lesion epigastric area      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          MEDICATIONS  (STANDING):  apixaban 2.5 milliGRAM(s) Oral two times a day  aspirin enteric coated 81 milliGRAM(s) Oral daily  cefepime   IVPB      cloNIDine 0.1 milliGRAM(s) Oral two times a day  dextrose 5%. 1000 milliLiter(s) (50 mL/Hr) IV Continuous <Continuous>  dextrose 5%. 1000 milliLiter(s) (100 mL/Hr) IV Continuous <Continuous>  dextrose 50% Injectable 12.5 Gram(s) IV Push once  dextrose 50% Injectable 25 Gram(s) IV Push once  dextrose 50% Injectable 25 Gram(s) IV Push once  diltiazem    Tablet 60 milliGRAM(s) Oral three times a day  dronabinol 2.5 milliGRAM(s) Oral two times a day before meals  glucagon  Injectable 1 milliGRAM(s) IntraMuscular once  imipramine 50 milliGRAM(s) Oral daily  insulin lispro (ADMELOG) corrective regimen sliding scale   SubCutaneous three times a day before meals  insulin lispro (ADMELOG) corrective regimen sliding scale   SubCutaneous at bedtime  lidocaine   4% Patch 1 Patch Transdermal daily  metoprolol tartrate 100 milliGRAM(s) Oral two times a day  mirtazapine 7.5 milliGRAM(s) Oral at bedtime  multivitamin 1 Tablet(s) Oral daily  pantoprazole    Tablet 40 milliGRAM(s) Oral before breakfast  polyethylene glycol 3350 17 Gram(s) Oral daily  senna 2 Tablet(s) Oral at bedtime  vancomycin  IVPB 750 milliGRAM(s) IV Intermittent once    MEDICATIONS  (PRN):  acetaminophen     Tablet .. 650 milliGRAM(s) Oral every 6 hours PRN Temp greater or equal to 38C (100.4F), Mild Pain (1 - 3)  dextrose Oral Gel 15 Gram(s) Oral once PRN Blood Glucose LESS THAN 70 milliGRAM(s)/deciliter  morphine  - Injectable 2 milliGRAM(s) IV Push every 6 hours PRN Severe Pain (7 - 10)  traMADol 25 milliGRAM(s) Oral every 6 hours PRN Moderate Pain (4 - 6)  traMADol 50 milliGRAM(s) Oral every 8 hours PRN Severe Pain (7 - 10)      OBJECTIVE    T(C): 37.2 (23 @ 11:56), Max: 38.3 (23 @ 20:51)  HR: 71 (23 @ 11:56) (66 - 85)  BP: 155/81 (23 @ 11:56) (144/54 - 166/57)  RR: 19 (23 @ 11:56) (17 - 19)  SpO2: 91% (23 @ 11:56) (91% - 94%)  Wt(kg): --  I&O's Summary        REVIEW OF SYSTEMS:  CONSTITUTIONAL: No fever, weight loss, or fatigue  EYES: No eye pain, visual disturbances, or discharge  ENMT:   No sinus or throat pain  NECK: No pain or stiffness  RESPIRATORY: No cough, wheezing, chills or hemoptysis; No shortness of breath  CARDIOVASCULAR: No chest pain, palpitations, dizziness, or leg swelling  GASTROINTESTINAL: No abdominal pain. No nausea, vomiting; No diarrhea or constipation. No melena or hematochezia.  GENITOURINARY: No dysuria, frequency, hematuria, or incontinence  NEUROLOGICAL: No headaches, memory loss, loss of strength, numbness, or tremors  SKIN: No itching, burning, rashes, or lesions   MUSCULOSKELETAL: No joint pain or swelling; No muscle, back, or extremity pain    PHYSICAL EXAM:  Appearance: NAD. VS past 24 hrs -as above   HEENT:   Moist oral mucosa. Conjunctiva clear b/l.   Neck : supple  Respiratory: Lungs CTAB.  Gastrointestinal:  Soft, nontender. No rebound. No rigidity. BS present	  Cardiovascular: RRR ,S1S2 present  Neurologic: Non-focal. Moving all extremities.  Extremities: No edema. No erythema. No calf tenderness.  Skin: No rashes, No ecchymoses, No cyanosis.	  wounds ,skin lesions-See skin assesment flow sheet   LABS:                        11.2   13.94 )-----------( 274      ( 2023 07:30 )             35.4     07-03    133<L>  |  94<L>  |  67<H>  ----------------------------<  237<H>  4.9   |  30  |  5.80<H>    Ca    9.1      2023 07:30    TPro  7.1  /  Alb  2.8<L>  /  TBili  0.4  /  DBili  x   /  AST  14<L>  /  ALT  17  /  AlkPhos  116  07-03    CAPILLARY BLOOD GLUCOSE      POCT Blood Glucose.: 321 mg/dL (2023 12:41)  POCT Blood Glucose.: 235 mg/dL (2023 08:48)  POCT Blood Glucose.: 269 mg/dL (2023 21:43)  POCT Blood Glucose.: 150 mg/dL (2023 17:32)    PT/INR - ( 2023 07:30 )   PT: 12.9 sec;   INR: 1.10 ratio           Urinalysis Basic - ( 2023 21:20 )    Color: Dark Yellow / Appearance: Turbid / S.015 / pH: x  Gluc: x / Ketone: Negative mg/dL  / Bili: Negative / Urobili: 0.2 mg/dL   Blood: x / Protein: 300 mg/dL / Nitrite: Negative   Leuk Esterase: Large / RBC: 12 /HPF / WBC 5 /HPF   Sq Epi: x / Non Sq Epi: x / Bacteria: Too Numerous to count /HPF        RADIOLOGY & ADDITIONAL TESTS:   reviewed elctronically  ASSESSMENT/PLAN: 	    25 minutes aggregate time was spent on this visit, 50% visit time spent in care co-ordination with other attendings and counselling patient .I have discussed care plan with patient / HCP/family member ,who expressed understanding of problems treatment and their effect and side effects, alternatives in details. I have asked if they have any questions and concerns and appropriately addressed them to best of my ability.

## 2023-07-04 NOTE — PROGRESS NOTE ADULT - ASSESSMENT
71YO F PMH hypertension, diabetes, dementia, end-stage renal disease on hemodialysis, who presented status post unwitnessed fall from [facility]- rolled out of bed. Patient complained of right hip pain--found to have fractures including the medial wall of the acetabulum and inferior right pubic ramus.    Patient found to have leukocytosis, probably reactive 2/2 hip fracture. She had fever overnight of unclear etiology, will start empiric antibiotics pending cultures. Otherwise no obvious signs/symptoms of infection, CXR shows moderate-large L pleural effusion--lower concern that this is infected given on room air and no obvious respiratory symptoms. Urinalysis without pyuria.    -start cefepime 1g IV q24h  -suggest vancomycin 750mg IV x 1  -suggest COVID + flu test  -follow blood cultures   -aspiration precautions  -wound care  -discussed with Dr. Marsha Tsai MD  Division of infectious Diseases  Cell [phone] between 8am and 6pm  After 6pm and over the weekends please call ID service line at [phone].

## 2023-07-04 NOTE — SWALLOW BEDSIDE ASSESSMENT ADULT - COMMENTS
Consult received, chart reviewed. Pt known to this service from prior admissions. Pt seen for bedside swallow assessment. Pt fatigued, AxAxO-1, on RA. Pt agreeable to assessment, readjusted in bed for purpose of PO trials. LEV Moy provided verbal approval prior to administering trials. Assessment completed. Recommending minced and moist solids with moderately thick liquids. LEV Moy and pt notified at bedside. Pt left as received, confirming 0/10 pain pre/post assessment. Called out to MD Larson.     Per charting, "7/1/2023 72-year-old female former smoker quit 3/2012  with significant past medical history for hypertension, CAD sp cabg  PVD diabetes, dementia, end-stage renal disease on hemodialysis a fib on eliquis sp recent hosp stay 5/17-5/24/2023  admitted 3/7-3/11/2023 and before that 2/22-2/25/2023"    7/3 Chest Xray: Left effusion/infiltrate, similar to prior  7/1 chest xray: Moderate-large LEFT effusion and/or lower zone airspace consolidation/atelectasis.

## 2023-07-04 NOTE — PROGRESS NOTE ADULT - ASSESSMENT
"  CC/DOA.        . 7/1/2023 Pt sent from [facility] for unwitnessed fall out of bed.       .  PRESENTATION.   . 7/1/2023 72-year-old female former smoker quit 3/2012  with PMH for hypertension, CAD sp cabg  PVD   sp R-> L bifem - fem BK pop bypass  Fem pop bypass 6/21/2022  Partial ray amputation r great toe 6/22/2022  diabetes, dementia, ESRD on hemodialysis   a fib on eliquis sp recent hosp stay 5/17-5/24/2023     . ENTERORHINOVIRUS INFECTION RESP TRACT 5/17  . PLEURAL EFFUSION SP l THORA 5/18 TRANSUDATE     COURSE   . ACETABULAR Fx Ortho recommended non surgical mgmt  . PL EFFSN Was decided to hold off thora unless she develops sob    PROBLEMS/ASSESSMENT/RECOMMENDATIONS (A/R).  PULMONARY.  . GAS EXCHANGE.  .. A/R.   .. Monitor pulse ox and target po 90-95%    . PLEURAL EFFSN.  .. RELEVANT PAST HISTORU  .. 5/18/2023 l thorac 1.5 l   .. 5/18 pl fl l 13 m 73 p 5 afb sm (-) g 131 l 102/268 (.38) ph 7.6 pr 2.6/6.4 (.4)  .. Fluid transudate  .. WORKUP   .. CXR 7/1/2023 cxr Mod large l pl effs or lower zone airspace consolidation/atelecatsis   .. CT ch 7/1/2023 Ct ch   .... Decreased mod l effs since 5/17/2023   .. EVENTS  .. 7/2/2023 DW pt and dw son consensus is that they want fluid remived   .. 7/3/2023 dw ir plan changed plan is to tap if she becomes symptimatic   .. A/R  .. 7/3/2023 Thora cancelled as pt is comfortable will pan thora if she becomes symptomatic     ID.  .. WORKUP.  .. W 7/1-7/2-7/3/2023 W 15 - 14 - 13  .. A/R   .. Leukocytosis likely sec to stress of hip fx  .. 7/1/2023 Checlk blood and urine cs     CARDIO.  . CAD.  .. 7/1/2023 ASA 81   .. A/R  .. cont rx    . A fib.  .. 7/1/2023 CARDIZEM 60.3   .. 7/3/2023 apixaba 2.5 x2   .. AR  .. Cont rx    HEMAT.  .. WORKUP.  .. Hb 7/1-7/2-7/3/2023 Hb 13 - 11.8 - 11.2   .. INR 7/1/2023    .. A/R  .. As pt was on apixaba will monitorserially     RENAL.  . ESRD.  .. WORKUP  .. NA 7/1/2023    .. K 7/1/2023 K 5.1   .. CR 7/1/2023 CR 5    ORTHO.  . FALL 7/1/2023.  .. CT head C sp 7/1/2023 CT HC (-)     . FRACTURE ACETABULUM r INFERIOR PUBIC RAMUS r 7/1/2023   .. IMAGING.  .. XR Pelvis and r hip 7/1/2023 XR Fractures r acetabulum and inf r pubic ramus   .. 7/1/2023 ct pelvix   .... comminuted r acetabular fx involving r sup and inf pubic rami medial wallacetabulum sup acetabulum and post wall acetabulum   .... small hematoma r pelvic sidewall   .. ORTHO.  .. 7/1/2023 DW Dr Dias She spoke to Ortho Dr Jenkins group and plan is for nonsurgical management  .. A/R  .. Monitor serial Hb ro bleed     . MAIN ISSUES  .. FX acetabulum on conservative rx  . A fib on doac  . ESRD   . Pl effs for thora when sob    TIME SPENT   . About 36 minutes aggregate care time spent on encounter; activities included   direct patient care, counseling and/or coordinating care reviewing notes, lab data/ imaging , discussion with multidisciplinary team/ patient  /family and explaining in detail risks, benefits, alternatives  of the recommendations     JOSE F LAO 72 f7/1/2023 1948 DR HARRY ALBRIGHT "

## 2023-07-04 NOTE — SWALLOW BEDSIDE ASSESSMENT ADULT - ADDITIONAL RECOMMENDATIONS
1. Moderately thick liquids, minced and moist solids.  2. Aspiration precautions-if any s/s penetration/aspiration noted d/c PO & initiate NPO w/ SLP to re-assess.   3. This service to follow up as schedule permits. 1. Moderately thick liquids, minced and moist solids.  2. Aspiration precautions-if any s/s penetration/aspiration noted d/c PO & initiate NPO w/ SLP to re-assess.   3. This service to follow up as schedule permits.  4. RD to ensure adequate caloric intake   5. Neuro for deviating tongue tip

## 2023-07-04 NOTE — PROGRESS NOTE ADULT - SUBJECTIVE AND OBJECTIVE BOX
Patient is a 72y Female with a known history of :  Hip fracture [S72.009A]    Closed pelvic fracture [S32.9XXA]    Pubic ramus fracture [S32.599A]    Right acetabular fracture [S32.401A]    ESRD on dialysis [N18.6]    Prophylactic measure [Z29.9]    HTN (hypertension) [I10]    Fall [W19.XXXA]    Pleural effusion [J90]      HPI:  72-year-old female with significant past medical history for hypertension, diabetes, dementia, end-stage renal disease on hemodialysis presents to the ED status post unwitnessed fall from [facility].  Patient states that she heard some voices then rolled out of bed.  Patient complaining of right hip pain.  Unknown head trauma.  + Eliquis < from: Xray Femur showed  Fractures including the medial wall of the acetabulum and inferior right pubic ramus Admitted for pain management ,PT/OT        (01 Jul 2023 15:24)      REVIEW OF SYSTEMS:    CONSTITUTIONAL: No fever, weight loss, or fatigue  EYES: No eye pain, visual disturbances, or discharge  ENMT:  No difficulty hearing, tinnitus, vertigo; No sinus or throat pain  NECK: No pain or stiffness  BREASTS: No pain, masses, or nipple discharge  RESPIRATORY: No cough, wheezing, chills or hemoptysis; No shortness of breath  CARDIOVASCULAR: No chest pain, palpitations, dizziness, or leg swelling  GASTROINTESTINAL: No abdominal or epigastric pain. No nausea, vomiting, or hematemesis; No diarrhea or constipation. No melena or hematochezia.  GENITOURINARY: No dysuria, frequency, hematuria, or incontinence  NEUROLOGICAL: No headaches, memory loss, loss of strength, numbness, or tremors  SKIN: No itching, burning, rashes, or lesions   LYMPH NODES: No enlarged glands  ENDOCRINE: No heat or cold intolerance; No hair loss  MUSCULOSKELETAL: No joint pain or swelling; No muscle, back, or extremity pain  PSYCHIATRIC: No depression, anxiety, mood swings, or difficulty sleeping  HEME/LYMPH: No easy bruising, or bleeding gums  ALLERGY AND IMMUNOLOGIC: No hives or eczema    MEDICATIONS  (STANDING):  apixaban 2.5 milliGRAM(s) Oral two times a day  aspirin enteric coated 81 milliGRAM(s) Oral daily  cloNIDine 0.1 milliGRAM(s) Oral two times a day  dextrose 5%. 1000 milliLiter(s) (50 mL/Hr) IV Continuous <Continuous>  dextrose 5%. 1000 milliLiter(s) (100 mL/Hr) IV Continuous <Continuous>  dextrose 50% Injectable 12.5 Gram(s) IV Push once  dextrose 50% Injectable 25 Gram(s) IV Push once  dextrose 50% Injectable 25 Gram(s) IV Push once  diltiazem    Tablet 60 milliGRAM(s) Oral three times a day  dronabinol 2.5 milliGRAM(s) Oral two times a day before meals  glucagon  Injectable 1 milliGRAM(s) IntraMuscular once  imipramine 50 milliGRAM(s) Oral daily  insulin lispro (ADMELOG) corrective regimen sliding scale   SubCutaneous three times a day before meals  insulin lispro (ADMELOG) corrective regimen sliding scale   SubCutaneous at bedtime  lidocaine   4% Patch 1 Patch Transdermal daily  metoprolol tartrate 100 milliGRAM(s) Oral two times a day  mirtazapine 7.5 milliGRAM(s) Oral at bedtime  multivitamin 1 Tablet(s) Oral daily  pantoprazole    Tablet 40 milliGRAM(s) Oral before breakfast  polyethylene glycol 3350 17 Gram(s) Oral daily  senna 2 Tablet(s) Oral at bedtime    MEDICATIONS  (PRN):  acetaminophen     Tablet .. 650 milliGRAM(s) Oral every 6 hours PRN Temp greater or equal to 38C (100.4F), Mild Pain (1 - 3)  dextrose Oral Gel 15 Gram(s) Oral once PRN Blood Glucose LESS THAN 70 milliGRAM(s)/deciliter  morphine  - Injectable 2 milliGRAM(s) IV Push every 6 hours PRN Severe Pain (7 - 10)  traMADol 25 milliGRAM(s) Oral every 6 hours PRN Moderate Pain (4 - 6)  traMADol 50 milliGRAM(s) Oral every 8 hours PRN Severe Pain (7 - 10)      ALLERGIES: No Known Drug Allergies  latex (Hives)      FAMILY HISTORY:  FH: myocardial infarction (Father)    FH: myocardial infarction (Father)    Family history of type 2 diabetes mellitus in brother (Sibling)        PHYSICAL EXAMINATION:  -----------------------------  T(C): 36.8 (07-04-23 @ 05:25), Max: 38.3 (07-03-23 @ 20:51)  HR: 80 (07-04-23 @ 05:25) (66 - 85)  BP: 151/69 (07-04-23 @ 05:25) (144/54 - 166/57)  RR: 17 (07-04-23 @ 05:25) (17 - 17)  SpO2: 94% (07-04-23 @ 05:25) (91% - 94%)  Wt(kg): --        VITALS  T(C): 36.8 (07-04-23 @ 05:25), Max: 38.3 (07-03-23 @ 20:51)  HR: 80 (07-04-23 @ 05:25) (66 - 85)  BP: 151/69 (07-04-23 @ 05:25) (144/54 - 166/57)  RR: 17 (07-04-23 @ 05:25) (17 - 17)  SpO2: 94% (07-04-23 @ 05:25) (91% - 94%)    Constitutional: well developed, normal appearance, well groomed, well nourished, no deformities and no acute distress.   Eyes: the conjunctiva exhibited no abnormalities and the eyelids demonstrated no xanthelasmas.   HEENT: normal oral mucosa, no oral pallor and no oral cyanosis.   Neck: normal jugular venous A waves present, normal jugular venous V waves present and no jugular venous wilson A waves.   Pulmonary: no respiratory distress, normal respiratory rhythm and effort, no accessory muscle use and lungs were clear to auscultation bilaterally.   Cardiovascular: heart rate and rhythm were normal, normal S1 and S2 and no murmur, gallop, rub, heave or thrill are present.   Abdomen: soft, non-tender, no hepato-splenomegaly and no abdominal mass palpated.   Musculoskeletal: the gait could not be assessed..   Extremities: no clubbing of the fingernails, no localized cyanosis, no petechial hemorrhages and no ischemic changes.   Skin: normal skin color and pigmentation, no rash, no venous stasis, no skin lesions, no skin ulcer and no xanthoma was observed.   Psychiatric: oriented to person, place, and time, the affect was normal, the mood was normal and not feeling anxious.     LABS:   --------  07-03    133<L>  |  94<L>  |  67<H>  ----------------------------<  237<H>  4.9   |  30  |  5.80<H>    Ca    9.1      03 Jul 2023 07:30    TPro  7.1  /  Alb  2.8<L>  /  TBili  0.4  /  DBili  x   /  AST  14<L>  /  ALT  17  /  AlkPhos  116  07-03                         11.2   13.94 )-----------( 274      ( 03 Jul 2023 07:30 )             35.4     PT/INR - ( 03 Jul 2023 07:30 )   PT: 12.9 sec;   INR: 1.10 ratio                     RADIOLOGY:  -----------------    ECG:     ECHO:

## 2023-07-04 NOTE — PROGRESS NOTE ADULT - SUBJECTIVE AND OBJECTIVE BOX
CHIEF COMPLAINT/ REASON FOR VISIT  .. Patient was seen to address the  issue listed under PROBLEM LIST which is located toward bottom of this note     SHILO KOHLER    [facility] 3WES [id number] D1    Allergies    No Known Drug Allergies  latex (Hives)    Intolerances        PAST MEDICAL & SURGICAL HISTORY:  HTN (hypertension)      h/o Anxiety attack      Depression      h/o Myocardial infarct       h/o Hepatitis A 1969  currently resolved, no symptoms      Murmur, cardiac      h/o Smoking  quitted 3/2012      Anemia secondary to renal failure      ESRD on dialysis      Falls      Ataxia      Type 2 diabetes mellitus      Peripheral vascular disease, unspecified      CAD (coronary artery disease)      Diabetes      coronary stent       s/p Ovarian cyst removal      s/p surgical removal of benign Skin lesion epigastric area      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          FAMILY HISTORY:  FH: myocardial infarction (Father)    FH: myocardial infarction (Father)    Family history of type 2 diabetes mellitus in brother (Sibling)        Home Medications:  acetaminophen 325 mg oral tablet: 2 tab(s) orally every 6 hours as needed (2023 15:24)  Admelog SoloStar 100 units/mL injectable solution: injectable 3 times a day (before meals)sliding scale  (2023 15:24)  apixaban 2.5 mg oral tablet: 1 tab(s) orally 2 times a day (2023 15:24)  aspirin 81 mg oral delayed release tablet: 1 tab(s) orally once a day (at bedtime) (2023 15:24)  atorvastatin 20 mg oral tablet: 1 tab(s) orally once a day (at bedtime) (2023 15:24)  bacitracin 500 units/g topical ointment: Apply topically to affected area once a day to right knee abrasion (2023 11:54)  Basaglar KwikPen 100 units/mL subcutaneous solution: 8 international unit(s) subcutaneous once a day (at bedtime) (2023 15:24)  cloNIDine 0.1 mg oral tablet: 1 tab(s) orally 2 times a day (2023 15:24)  DilTIAZem (Eqv-Cardizem CD) 180 mg/24 hours oral capsule, extended release: 1 cap(s) orally once a day (2023 15:24)  imipramine 50 mg oral tablet: 1 tab(s) orally once a day (in the evening) (2023 15:24)  metoprolol tartrate 100 mg oral tablet: 1 tab(s) orally 2 times a day (2023 15:24)  mirtazapine 7.5 mg oral tablet: 1 tab(s) orally once a day (at bedtime) (2023 15:24)  Nephro-Alfie oral tablet: 1 tab(s) orally once a day (2023 15:24)  PANTOPRAZOLE 40MG DR TAB: 1 tab(s) orally once a day (2023 15:24)  senna leaf extract oral tablet: 2 tab(s) orally once a day (at bedtime) (2023 15:24)      MEDICATIONS  (STANDING):  apixaban 2.5 milliGRAM(s) Oral two times a day  aspirin enteric coated 81 milliGRAM(s) Oral daily  cloNIDine 0.1 milliGRAM(s) Oral two times a day  dextrose 5%. 1000 milliLiter(s) (50 mL/Hr) IV Continuous <Continuous>  dextrose 5%. 1000 milliLiter(s) (100 mL/Hr) IV Continuous <Continuous>  dextrose 50% Injectable 12.5 Gram(s) IV Push once  dextrose 50% Injectable 25 Gram(s) IV Push once  dextrose 50% Injectable 25 Gram(s) IV Push once  diltiazem    Tablet 60 milliGRAM(s) Oral three times a day  dronabinol 2.5 milliGRAM(s) Oral two times a day before meals  glucagon  Injectable 1 milliGRAM(s) IntraMuscular once  imipramine 50 milliGRAM(s) Oral daily  insulin lispro (ADMELOG) corrective regimen sliding scale   SubCutaneous three times a day before meals  insulin lispro (ADMELOG) corrective regimen sliding scale   SubCutaneous at bedtime  lidocaine   4% Patch 1 Patch Transdermal daily  metoprolol tartrate 100 milliGRAM(s) Oral two times a day  mirtazapine 7.5 milliGRAM(s) Oral at bedtime  multivitamin 1 Tablet(s) Oral daily  pantoprazole    Tablet 40 milliGRAM(s) Oral before breakfast  polyethylene glycol 3350 17 Gram(s) Oral daily  senna 2 Tablet(s) Oral at bedtime    MEDICATIONS  (PRN):  acetaminophen     Tablet .. 650 milliGRAM(s) Oral every 6 hours PRN Temp greater or equal to 38C (100.4F), Mild Pain (1 - 3)  dextrose Oral Gel 15 Gram(s) Oral once PRN Blood Glucose LESS THAN 70 milliGRAM(s)/deciliter  morphine  - Injectable 2 milliGRAM(s) IV Push every 6 hours PRN Severe Pain (7 - 10)  traMADol 25 milliGRAM(s) Oral every 6 hours PRN Moderate Pain (4 - 6)  traMADol 50 milliGRAM(s) Oral every 8 hours PRN Severe Pain (7 - 10)              Vital Signs Last 24 Hrs  T(C): 36.8 (2023 05:25), Max: 38.3 (2023 20:51)  T(F): 98.2 (2023 05:25), Max: 101 (2023 20:51)  HR: 80 (2023 05:25) (66 - 85)  BP: 151/69 (2023 05:25) (144/54 - 166/57)  BP(mean): --  RR: 17 (2023 05:25) (17 - 17)  SpO2: 94% (2023 05:25) (91% - 94%)    Parameters below as of 2023 05:25  Patient On (Oxygen Delivery Method): room air                  LABS:                        11.2   13.94 )-----------( 274      ( 2023 07:30 )             35.4     07-03    133<L>  |  94<L>  |  67<H>  ----------------------------<  237<H>  4.9   |  30  |  5.80<H>    Ca    9.1      2023 07:30    TPro  7.1  /  Alb  2.8<L>  /  TBili  0.4  /  DBili  x   /  AST  14<L>  /  ALT  17  /  AlkPhos  116  07-03    PT/INR - ( 2023 07:30 )   PT: 12.9 sec;   INR: 1.10 ratio           Urinalysis Basic - ( 2023 21:20 )    Color: Dark Yellow / Appearance: Turbid / S.015 / pH: x  Gluc: x / Ketone: Negative mg/dL  / Bili: Negative / Urobili: 0.2 mg/dL   Blood: x / Protein: 300 mg/dL / Nitrite: Negative   Leuk Esterase: Large / RBC: 12 /HPF / WBC 5 /HPF   Sq Epi: x / Non Sq Epi: x / Bacteria: Too Numerous to count /HPF            WBC:  WBC Count: 13.94 K/uL ( @ 07:30)  WBC Count: 14.36 K/uL ( @ 09:01)  WBC Count: 15.57 K/uL ( @ 10:00)      MICROBIOLOGY:  RECENT CULTURES:              PT/INR - ( 2023 07:30 )   PT: 12.9 sec;   INR: 1.10 ratio             Sodium:  Sodium: 133 mmol/L ( @ 07:30)  Sodium: 132 mmol/L ( @ 09:)  Sodium: 135 mmol/L ( @ 10:00)      5.80 mg/dL  @ 07:30  4.10 mg/dL  @ 09:01  5.00 mg/dL  @ 10:00      Hemoglobin:  Hemoglobin: 11.2 g/dL ( @ 07:30)  Hemoglobin: 11.8 g/dL ( @ 09:01)  Hemoglobin: 13.1 g/dL ( @ 10:00)      Platelets: Platelet Count - Automated: 274 K/uL ( @ 07:30)  Platelet Count - Automated: 265 K/uL ( @ 09:01)  Platelet Count - Automated: 281 K/uL ( @ 10:00)      LIVER FUNCTIONS - ( 2023 07:30 )  Alb: 2.8 g/dL / Pro: 7.1 g/dL / ALK PHOS: 116 U/L / ALT: 17 U/L / AST: 14 U/L / GGT: x             Urinalysis Basic - ( 2023 21:20 )    Color: Dark Yellow / Appearance: Turbid / S.015 / pH: x  Gluc: x / Ketone: Negative mg/dL  / Bili: Negative / Urobili: 0.2 mg/dL   Blood: x / Protein: 300 mg/dL / Nitrite: Negative   Leuk Esterase: Large / RBC: 12 /HPF / WBC 5 /HPF   Sq Epi: x / Non Sq Epi: x / Bacteria: Too Numerous to count /HPF        RADIOLOGY & ADDITIONAL STUDIES:      MICROBIOLOGY:  RECENT CULTURES:

## 2023-07-04 NOTE — PROGRESS NOTE ADULT - ASSESSMENT
. 7/1/2023 72-year-old female former smoker quit 3/2012  with significant past medical history for hypertension, CAD sp cabg  PVD  diabetes, dementia, end-stage renal disease on hemodialysis   a fib on eliquis sp recent hosp stay 5/17-5/24/2023  admitted 3/7-3/11/2023 and before that 2/22-2/25/2023   . During 5/2023 admission she had   . ENTERORHINOVIRUS INFECTION RESP TRACT 5/17  . PLEURAL EFFUSION SP l THORA 5/18 TRANSUDATE     . Patient now  presented to the ED 7/1/2023 status post unwitnessed fall from [facility].  Patient states that she heard some voices then rolled out of bed.  Patient complaining of right hip pain.  Unknown head trauma.  + Eliquis     She was found to have acetabular fracture which Ortho was contacted and they plan non operative management Pt was admitted to [facility] for bleed as she was on apixaban and was noted to have pl effsn    . 7/1/2023  I was asked to admit pt                      (01 Jul 2023 13:32)      esrd on hd tues thurs sat     ANEMIA PLAN:  Anemia of chronic disease:  Well controlled by Aranesp  H and H subtherapeutic .  We will continue Aranesp aiming for a HCT of 32-36 %.   We will monitor Iron stores, B12 and RBC folate .      BP monitoring,continue current antihypertensive meds, low salt diet,followup with PMD in 1-2 weeks      Accuchecks monitoring and insulin sliding scale coverage, no concentrated sweets diet, serial labs and f/up with PMD, monitor HB A 1 C every 3-4 mnth

## 2023-07-04 NOTE — PROGRESS NOTE ADULT - SUBJECTIVE AND OBJECTIVE BOX
Patient is a 72y Female whom presented to the hospital with esrd on hd     PAST MEDICAL & SURGICAL HISTORY:  HTN (hypertension)      h/o Anxiety attack      Depression      h/o Myocardial infarct       h/o Hepatitis A 1969  currently resolved, no symptoms      Murmur, cardiac      h/o Smoking  quitted 3/2012      Anemia secondary to renal failure      ESRD on dialysis      Falls      Ataxia      Type 2 diabetes mellitus      Peripheral vascular disease, unspecified      CAD (coronary artery disease)      Diabetes      coronary stent       s/p Ovarian cyst removal      s/p surgical removal of benign Skin lesion epigastric area      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 650 milliGRAM(s) Oral every 6 hours  aspirin enteric coated 81 milliGRAM(s) Oral daily  cloNIDine 0.1 milliGRAM(s) Oral two times a day  dextrose 5%. 1000 milliLiter(s) (50 mL/Hr) IV Continuous <Continuous>  dextrose 5%. 1000 milliLiter(s) (100 mL/Hr) IV Continuous <Continuous>  dextrose 50% Injectable 25 Gram(s) IV Push once  dextrose 50% Injectable 12.5 Gram(s) IV Push once  dextrose 50% Injectable 25 Gram(s) IV Push once  diltiazem    Tablet 60 milliGRAM(s) Oral three times a day  dronabinol 2.5 milliGRAM(s) Oral two times a day before meals  glucagon  Injectable 1 milliGRAM(s) IntraMuscular once  imipramine 50 milliGRAM(s) Oral daily  insulin lispro (ADMELOG) corrective regimen sliding scale   SubCutaneous three times a day before meals  metoprolol tartrate 100 milliGRAM(s) Oral two times a day  mirtazapine 7.5 milliGRAM(s) Oral at bedtime  multivitamin 1 Tablet(s) Oral daily  pantoprazole    Tablet 40 milliGRAM(s) Oral before breakfast  senna 2 Tablet(s) Oral at bedtime  sodium chloride 0.45%. 1000 milliLiter(s) (50 mL/Hr) IV Continuous <Continuous>      Allergies    No Known Drug Allergies  latex (Hives)    Intolerances        SOCIAL HISTORY:  Denies ETOh,Smoking,     FAMILY HISTORY:  FH: myocardial infarction (Father)    FH: myocardial infarction (Father)    Family history of type 2 diabetes mellitus in brother (Sibling)        REVIEW OF SYSTEMS:    CONSTITUTIONAL: No weakness, fevers or chills  RESPIRATORY: No cough, wheezing, hemoptysis; No shortness of breath  CARDIOVASCULAR: No chest pain or palpitations  GASTROINTESTINAL: No abdominal or epigastric pain. No nausea, vomiting,                           11.2   13.94 )-----------( 274      ( 2023 07:30 )             35.4       CBC Full  -  ( 2023 07:30 )  WBC Count : 13.94 K/uL  RBC Count : 3.62 M/uL  Hemoglobin : 11.2 g/dL  Hematocrit : 35.4 %  Platelet Count - Automated : 274 K/uL  Mean Cell Volume : 97.8 fl  Mean Cell Hemoglobin : 30.9 pg  Mean Cell Hemoglobin Concentration : 31.6 gm/dL  Auto Neutrophil # : 10.95 K/uL  Auto Lymphocyte # : 0.77 K/uL  Auto Monocyte # : 1.62 K/uL  Auto Eosinophil # : 0.31 K/uL  Auto Basophil # : 0.07 K/uL  Auto Neutrophil % : 78.6 %  Auto Lymphocyte % : 5.5 %  Auto Monocyte % : 11.6 %  Auto Eosinophil % : 2.2 %  Auto Basophil % : 0.5 %      0703    133<L>  |  94<L>  |  67<H>  ----------------------------<  237<H>  4.9   |  30  |  5.80<H>    Ca    9.1      2023 07:30    TPro  7.1  /  Alb  2.8<L>  /  TBili  0.4  /  DBili  x   /  AST  14<L>  /  ALT  17  /  AlkPhos  116  07-03      CAPILLARY BLOOD GLUCOSE      POCT Blood Glucose.: 235 mg/dL (2023 08:48)  POCT Blood Glucose.: 269 mg/dL (2023 21:43)  POCT Blood Glucose.: 150 mg/dL (2023 17:32)      Vital Signs Last 24 Hrs  T(C): 37.2 (2023 11:56), Max: 38.3 (2023 20:51)  T(F): 98.9 (2023 11:56), Max: 101 (2023 20:51)  HR: 71 (2023 11:56) (66 - 85)  BP: 155/81 (2023 11:56) (144/54 - 166/57)  BP(mean): --  RR: 19 (2023 11:56) (17 - 19)  SpO2: 91% (2023 11:56) (91% - 94%)    Parameters below as of 2023 11:56  Patient On (Oxygen Delivery Method): room air        Urinalysis Basic - ( 2023 21:20 )    Color: Dark Yellow / Appearance: Turbid / S.015 / pH: x  Gluc: x / Ketone: Negative mg/dL  / Bili: Negative / Urobili: 0.2 mg/dL   Blood: x / Protein: 300 mg/dL / Nitrite: Negative   Leuk Esterase: Large / RBC: 12 /HPF / WBC 5 /HPF   Sq Epi: x / Non Sq Epi: x / Bacteria: Too Numerous to count /HPF        PT/INR - ( 2023 07:30 )   PT: 12.9 sec;   INR: 1.10 ratio                        PHYSICAL EXAM:    Constitutional: NAD  HEENT: conjunctive   clear   Neck:  No JVD  Respiratory: CTAB  Cardiovascular: S1 and S2  Gastrointestinal: BS+, soft, NT/ND  Extremities: No peripheral edema  Neurological:  no focal deficits  pos fistula

## 2023-07-05 DIAGNOSIS — R50.9 FEVER, UNSPECIFIED: ICD-10-CM

## 2023-07-05 LAB
ANION GAP SERPL CALC-SCNC: 13 MMOL/L — SIGNIFICANT CHANGE UP (ref 5–17)
BUN SERPL-MCNC: 54 MG/DL — HIGH (ref 7–23)
CALCIUM SERPL-MCNC: 9.7 MG/DL — SIGNIFICANT CHANGE UP (ref 8.5–10.1)
CHLORIDE SERPL-SCNC: 94 MMOL/L — LOW (ref 96–108)
CO2 SERPL-SCNC: 22 MMOL/L — SIGNIFICANT CHANGE UP (ref 22–31)
CREAT SERPL-MCNC: 5.2 MG/DL — HIGH (ref 0.5–1.3)
CULTURE RESULTS: SIGNIFICANT CHANGE UP
EGFR: 8 ML/MIN/1.73M2 — LOW
GLUCOSE SERPL-MCNC: 291 MG/DL — HIGH (ref 70–99)
HCT VFR BLD CALC: 34 % — LOW (ref 34.5–45)
HGB BLD-MCNC: 11.2 G/DL — LOW (ref 11.5–15.5)
INR BLD: 1.42 RATIO — HIGH (ref 0.88–1.16)
MCHC RBC-ENTMCNC: 31.6 PG — SIGNIFICANT CHANGE UP (ref 27–34)
MCHC RBC-ENTMCNC: 32.9 GM/DL — SIGNIFICANT CHANGE UP (ref 32–36)
MCV RBC AUTO: 96 FL — SIGNIFICANT CHANGE UP (ref 80–100)
NRBC # BLD: 0 /100 WBCS — SIGNIFICANT CHANGE UP (ref 0–0)
PLATELET # BLD AUTO: 326 K/UL — SIGNIFICANT CHANGE UP (ref 150–400)
POTASSIUM SERPL-MCNC: 5.1 MMOL/L — SIGNIFICANT CHANGE UP (ref 3.5–5.3)
POTASSIUM SERPL-SCNC: 5.1 MMOL/L — SIGNIFICANT CHANGE UP (ref 3.5–5.3)
PROTHROM AB SERPL-ACNC: 16.7 SEC — HIGH (ref 10.5–13.4)
RBC # BLD: 3.54 M/UL — LOW (ref 3.8–5.2)
RBC # FLD: 16.6 % — HIGH (ref 10.3–14.5)
SODIUM SERPL-SCNC: 129 MMOL/L — LOW (ref 135–145)
SPECIMEN SOURCE: SIGNIFICANT CHANGE UP
WBC # BLD: 18.89 K/UL — HIGH (ref 3.8–10.5)
WBC # FLD AUTO: 18.89 K/UL — HIGH (ref 3.8–10.5)

## 2023-07-05 PROCEDURE — 99232 SBSQ HOSP IP/OBS MODERATE 35: CPT

## 2023-07-05 PROCEDURE — 99233 SBSQ HOSP IP/OBS HIGH 50: CPT

## 2023-07-05 RX ADMIN — Medication 60 MILLIGRAM(S): at 21:14

## 2023-07-05 RX ADMIN — Medication 1 TABLET(S): at 11:44

## 2023-07-05 RX ADMIN — LIDOCAINE 1 PATCH: 4 CREAM TOPICAL at 23:49

## 2023-07-05 RX ADMIN — LIDOCAINE 1 PATCH: 4 CREAM TOPICAL at 19:47

## 2023-07-05 RX ADMIN — LIDOCAINE 1 PATCH: 4 CREAM TOPICAL at 11:45

## 2023-07-05 RX ADMIN — PANTOPRAZOLE SODIUM 40 MILLIGRAM(S): 20 TABLET, DELAYED RELEASE ORAL at 06:22

## 2023-07-05 RX ADMIN — MIRTAZAPINE 7.5 MILLIGRAM(S): 45 TABLET, ORALLY DISINTEGRATING ORAL at 21:15

## 2023-07-05 RX ADMIN — Medication 60 MILLIGRAM(S): at 13:05

## 2023-07-05 RX ADMIN — TRAMADOL HYDROCHLORIDE 50 MILLIGRAM(S): 50 TABLET ORAL at 08:04

## 2023-07-05 RX ADMIN — Medication 2.5 MILLIGRAM(S): at 17:23

## 2023-07-05 RX ADMIN — TRAMADOL HYDROCHLORIDE 50 MILLIGRAM(S): 50 TABLET ORAL at 07:34

## 2023-07-05 RX ADMIN — Medication 0.1 MILLIGRAM(S): at 17:23

## 2023-07-05 RX ADMIN — Medication 60 MILLIGRAM(S): at 06:23

## 2023-07-05 RX ADMIN — Medication 81 MILLIGRAM(S): at 11:44

## 2023-07-05 RX ADMIN — Medication 0.1 MILLIGRAM(S): at 06:23

## 2023-07-05 RX ADMIN — Medication 50 MILLIGRAM(S): at 11:44

## 2023-07-05 RX ADMIN — Medication 2: at 08:18

## 2023-07-05 RX ADMIN — Medication 2: at 17:25

## 2023-07-05 RX ADMIN — Medication 100 MILLIGRAM(S): at 17:25

## 2023-07-05 RX ADMIN — SENNA PLUS 2 TABLET(S): 8.6 TABLET ORAL at 21:15

## 2023-07-05 RX ADMIN — APIXABAN 2.5 MILLIGRAM(S): 2.5 TABLET, FILM COATED ORAL at 06:23

## 2023-07-05 RX ADMIN — POLYETHYLENE GLYCOL 3350 17 GRAM(S): 17 POWDER, FOR SOLUTION ORAL at 11:45

## 2023-07-05 RX ADMIN — Medication 4: at 12:51

## 2023-07-05 RX ADMIN — Medication 100 MILLIGRAM(S): at 06:23

## 2023-07-05 RX ADMIN — CEFEPIME 100 MILLIGRAM(S): 1 INJECTION, POWDER, FOR SOLUTION INTRAMUSCULAR; INTRAVENOUS at 11:44

## 2023-07-05 RX ADMIN — APIXABAN 2.5 MILLIGRAM(S): 2.5 TABLET, FILM COATED ORAL at 17:23

## 2023-07-05 RX ADMIN — Medication 2.5 MILLIGRAM(S): at 06:26

## 2023-07-05 NOTE — PROGRESS NOTE ADULT - SUBJECTIVE AND OBJECTIVE BOX
Patient is a 72y Female whom presented to the hospital with esrd on hd     PAST MEDICAL & SURGICAL HISTORY:  HTN (hypertension)      h/o Anxiety attack      Depression      h/o Myocardial infarct 2007      h/o Hepatitis A 1969  currently resolved, no symptoms      Murmur, cardiac      h/o Smoking  quitted 3/2012      Anemia secondary to renal failure      ESRD on dialysis      Falls      Ataxia      Type 2 diabetes mellitus      Peripheral vascular disease, unspecified      CAD (coronary artery disease)      Diabetes      coronary stent 2007      s/p Ovarian cyst removal      s/p surgical removal of benign Skin lesion epigastric area      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 650 milliGRAM(s) Oral every 6 hours  aspirin enteric coated 81 milliGRAM(s) Oral daily  cloNIDine 0.1 milliGRAM(s) Oral two times a day  dextrose 5%. 1000 milliLiter(s) (50 mL/Hr) IV Continuous <Continuous>  dextrose 5%. 1000 milliLiter(s) (100 mL/Hr) IV Continuous <Continuous>  dextrose 50% Injectable 25 Gram(s) IV Push once  dextrose 50% Injectable 12.5 Gram(s) IV Push once  dextrose 50% Injectable 25 Gram(s) IV Push once  diltiazem    Tablet 60 milliGRAM(s) Oral three times a day  dronabinol 2.5 milliGRAM(s) Oral two times a day before meals  glucagon  Injectable 1 milliGRAM(s) IntraMuscular once  imipramine 50 milliGRAM(s) Oral daily  insulin lispro (ADMELOG) corrective regimen sliding scale   SubCutaneous three times a day before meals  metoprolol tartrate 100 milliGRAM(s) Oral two times a day  mirtazapine 7.5 milliGRAM(s) Oral at bedtime  multivitamin 1 Tablet(s) Oral daily  pantoprazole    Tablet 40 milliGRAM(s) Oral before breakfast  senna 2 Tablet(s) Oral at bedtime  sodium chloride 0.45%. 1000 milliLiter(s) (50 mL/Hr) IV Continuous <Continuous>      Allergies    No Known Drug Allergies  latex (Hives)    Intolerances        SOCIAL HISTORY:  Denies ETOh,Smoking,     FAMILY HISTORY:  FH: myocardial infarction (Father)    FH: myocardial infarction (Father)    Family history of type 2 diabetes mellitus in brother (Sibling)        REVIEW OF SYSTEMS:    CONSTITUTIONAL: No weakness, fevers or chills  RESPIRATORY: No cough, wheezing, hemoptysis; No shortness of breath  CARDIOVASCULAR: No chest pain or palpitations  GASTROINTESTINAL: No abdominal or epigastric pain. No nausea, vomiting,                                             11.2   18.89 )-----------( 326      ( 05 Jul 2023 08:28 )             34.0       CBC Full  -  ( 05 Jul 2023 08:28 )  WBC Count : 18.89 K/uL  RBC Count : 3.54 M/uL  Hemoglobin : 11.2 g/dL  Hematocrit : 34.0 %  Platelet Count - Automated : 326 K/uL  Mean Cell Volume : 96.0 fl  Mean Cell Hemoglobin : 31.6 pg  Mean Cell Hemoglobin Concentration : 32.9 gm/dL  Auto Neutrophil # : x  Auto Lymphocyte # : x  Auto Monocyte # : x  Auto Eosinophil # : x  Auto Basophil # : x  Auto Neutrophil % : x  Auto Lymphocyte % : x  Auto Monocyte % : x  Auto Eosinophil % : x  Auto Basophil % : x      07-05    129<L>  |  94<L>  |  54<H>  ----------------------------<  291<H>  5.1   |  22  |  5.20<H>    Ca    9.7      05 Jul 2023 08:28        CAPILLARY BLOOD GLUCOSE      POCT Blood Glucose.: 318 mg/dL (05 Jul 2023 12:10)  POCT Blood Glucose.: 250 mg/dL (05 Jul 2023 08:10)  POCT Blood Glucose.: 285 mg/dL (04 Jul 2023 21:19)  POCT Blood Glucose.: 139 mg/dL (04 Jul 2023 16:22)      Vital Signs Last 24 Hrs  T(C): 36.6 (05 Jul 2023 11:57), Max: 37.7 (04 Jul 2023 21:03)  T(F): 97.9 (05 Jul 2023 11:57), Max: 99.8 (04 Jul 2023 21:03)  HR: 68 (05 Jul 2023 11:57) (68 - 88)  BP: 155/76 (05 Jul 2023 11:57) (132/55 - 180/88)  BP(mean): --  RR: 18 (05 Jul 2023 11:57) (18 - 18)  SpO2: 91% (05 Jul 2023 11:57) (91% - 98%)    Parameters below as of 05 Jul 2023 11:57  Patient On (Oxygen Delivery Method): room air        Urinalysis Basic - ( 05 Jul 2023 08:28 )    Color: x / Appearance: x / SG: x / pH: x  Gluc: 291 mg/dL / Ketone: x  / Bili: x / Urobili: x   Blood: x / Protein: x / Nitrite: x   Leuk Esterase: x / RBC: x / WBC x   Sq Epi: x / Non Sq Epi: x / Bacteria: x        PT/INR - ( 05 Jul 2023 08:28 )   PT: 16.7 sec;   INR: 1.42 ratio               PT/INR - ( 03 Jul 2023 07:30 )   PT: 12.9 sec;   INR: 1.10 ratio                        PHYSICAL EXAM:    Constitutional: NAD  HEENT: conjunctive   clear   Neck:  No JVD  Respiratory: CTAB  Cardiovascular: S1 and S2  Gastrointestinal: BS+, soft, NT/ND  Extremities: No peripheral edema  Neurological:  no focal deficits  pos fistula

## 2023-07-05 NOTE — PROGRESS NOTE ADULT - SUBJECTIVE AND OBJECTIVE BOX
PROGRESS NOTE  Patient is a 72y old  Female who presents with a chief complaint of s/p fall (05 Jul 2023 14:11)    Chart and available morning labs /imaging are reviewed electronically , urgent issues addressed . More information  is being added upon completion of rounds , when more information is collected and management discussed with consultants , medical staff and social service/case management on the floor   OVERNIGHT    No new issues reported by medical staff . All above noted Patient is resting in a bed comfortably .Confused ,poor mentation .No distress noted Refused thoracocenthesis  HPI:  72-year-old female with significant past medical history for hypertension, diabetes, dementia, end-stage renal disease on hemodialysis presents to the ED status post unwitnessed fall from [facility].  Patient states that she heard some voices then rolled out of bed.  Patient complaining of right hip pain.  Unknown head trauma.  + Eliquis < from: Xray Femur showed  Fractures including the medial wall of the acetabulum and inferior right pubic ramus Admitted for pain management ,PT/OT        (01 Jul 2023 15:24)    PAST MEDICAL & SURGICAL HISTORY:  HTN (hypertension)      h/o Anxiety attack      Depression      h/o Myocardial infarct 2007      h/o Hepatitis A 1969  currently resolved, no symptoms      Murmur, cardiac      h/o Smoking  quitted 3/2012      Anemia secondary to renal failure      ESRD on dialysis      Falls      Ataxia      Type 2 diabetes mellitus      Peripheral vascular disease, unspecified      CAD (coronary artery disease)      Diabetes      coronary stent 2007      s/p Ovarian cyst removal      s/p surgical removal of benign Skin lesion epigastric area      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 1000 milliLiter(s) (50 mL/Hr) IV Continuous <Continuous>  dextrose 5%. 1000 milliLiter(s) (100 mL/Hr) IV Continuous <Continuous>  dextrose 50% Injectable 12.5 Gram(s) IV Push once  dextrose 50% Injectable 25 Gram(s) IV Push once  dextrose 50% Injectable 25 Gram(s) IV Push once  diltiazem    Tablet 60 milliGRAM(s) Oral three times a day  dronabinol 2.5 milliGRAM(s) Oral two times a day before meals  glucagon  Injectable 1 milliGRAM(s) IntraMuscular once  imipramine 50 milliGRAM(s) Oral daily  insulin lispro (ADMELOG) corrective regimen sliding scale   SubCutaneous three times a day before meals  insulin lispro (ADMELOG) corrective regimen sliding scale   SubCutaneous at bedtime  lidocaine   4% Patch 1 Patch Transdermal daily  metoprolol tartrate 100 milliGRAM(s) Oral two times a day  mirtazapine 7.5 milliGRAM(s) Oral at bedtime  multivitamin 1 Tablet(s) Oral daily  pantoprazole    Tablet 40 milliGRAM(s) Oral before breakfast  polyethylene glycol 3350 17 Gram(s) Oral daily  senna 2 Tablet(s) Oral at bedtime    MEDICATIONS  (PRN):  acetaminophen     Tablet .. 650 milliGRAM(s) Oral every 6 hours PRN Temp greater or equal to 38C (100.4F), Mild Pain (1 - 3)  dextrose Oral Gel 15 Gram(s) Oral once PRN Blood Glucose LESS THAN 70 milliGRAM(s)/deciliter  morphine  - Injectable 2 milliGRAM(s) IV Push every 6 hours PRN Severe Pain (7 - 10)  traMADol 25 milliGRAM(s) Oral every 6 hours PRN Moderate Pain (4 - 6)  traMADol 50 milliGRAM(s) Oral every 8 hours PRN Severe Pain (7 - 10)      OBJECTIVE    T(C): 36.6 (07-05-23 @ 11:57), Max: 37.7 (07-04-23 @ 21:03)  HR: 68 (07-05-23 @ 11:57) (68 - 88)  BP: 155/76 (07-05-23 @ 11:57) (137/73 - 180/88)  RR: 18 (07-05-23 @ 11:57) (18 - 18)  SpO2: 91% (07-05-23 @ 11:57) (91% - 93%)  Wt(kg): --  I&O's Summary    04 Jul 2023 07:01  -  05 Jul 2023 07:00  --------------------------------------------------------  IN: 0 mL / OUT: 1500 mL / NET: -1500 mL          REVIEW OF SYSTEMS:  CONSTITUTIONAL: No fever, weight loss, or fatigue  EYES: No eye pain, visual disturbances, or discharge  ENMT:   No sinus or throat pain  NECK: No pain or stiffness  RESPIRATORY: No cough, wheezing, chills or hemoptysis; No shortness of breath  CARDIOVASCULAR: No chest pain, palpitations, dizziness, or leg swelling  GASTROINTESTINAL: No abdominal pain. No nausea, vomiting; No diarrhea or constipation. No melena or hematochezia.  GENITOURINARY: No dysuria, frequency, hematuria, or incontinence  NEUROLOGICAL: No headaches, memory loss, loss of strength, numbness, or tremors  SKIN: No itching, burning, rashes, or lesions   MUSCULOSKELETAL: No joint pain or swelling; No muscle, back, or extremity pain    PHYSICAL EXAM:  Appearance: NAD. VS past 24 hrs -as above   HEENT:   Moist oral mucosa. Conjunctiva clear b/l.   Neck : supple  Respiratory: Lungs CTAB.  Gastrointestinal:  Soft, nontender. No rebound. No rigidity. BS present	  Cardiovascular: RRR ,S1S2 present  Neurologic: Non-focal. Moving all extremities.  Extremities: No edema. No erythema. No calf tenderness.  Skin: No rashes, No ecchymoses, No cyanosis.	  wounds ,skin lesions-See skin assesment flow sheet   LABS:                        11.2   18.89 )-----------( 326      ( 05 Jul 2023 08:28 )             34.0     07-05    129<L>  |  94<L>  |  54<H>  ----------------------------<  291<H>  5.1   |  22  |  5.20<H>    Ca    9.7      05 Jul 2023 08:28      CAPILLARY BLOOD GLUCOSE      POCT Blood Glucose.: 239 mg/dL (05 Jul 2023 17:21)  POCT Blood Glucose.: 318 mg/dL (05 Jul 2023 12:10)  POCT Blood Glucose.: 250 mg/dL (05 Jul 2023 08:10)  POCT Blood Glucose.: 285 mg/dL (04 Jul 2023 21:19)    PT/INR - ( 05 Jul 2023 08:28 )   PT: 16.7 sec;   INR: 1.42 ratio           Urinalysis Basic - ( 05 Jul 2023 08:28 )    Color: x / Appearance: x / SG: x / pH: x  Gluc: 291 mg/dL / Ketone: x  / Bili: x / Urobili: x   Blood: x / Protein: x / Nitrite: x   Leuk Esterase: x / RBC: x / WBC x   Sq Epi: x / Non Sq Epi: x / Bacteria: x        Culture - Urine (collected 03 Jul 2023 21:20)  Source: Clean Catch Clean Catch (Midstream)  Preliminary Report (05 Jul 2023 17:11):    >100,000 CFU/ml Gram Negative Rods    Culture - Blood (collected 03 Jul 2023 16:10)  Source: .Blood Blood  Preliminary Report (05 Jul 2023 01:03):    No growth at 24 hours    Culture - Blood (collected 03 Jul 2023 16:05)  Source: .Blood Blood  Preliminary Report (05 Jul 2023 01:03):    No growth at 24 hours      RADIOLOGY & ADDITIONAL TESTS:   reviewed elctronically  ASSESSMENT/PLAN: 	  25 minutes aggregate time was spent on this visit, 50% visit time spent in care co-ordination with other attendings and counselling patient .I have discussed care plan with patient / HCP/family member ,who expressed understanding of problems treatment and their effect and side effects, alternatives in details. I have asked if they have any questions and concerns and appropriately addressed them to best of my ability.

## 2023-07-05 NOTE — SOCIAL WORK PROGRESS NOTE - NSSWPROGRESSNOTE_GEN_ALL_CORE
SW spoke with this pt at bedside and spoke with pts son- discussed DC planning. Pt from [facility], will return on DC. SW to follow.

## 2023-07-05 NOTE — PROGRESS NOTE ADULT - SUBJECTIVE AND OBJECTIVE BOX
[facility]  INFECTIOUS DISEASES - [address], [place] [address]  Tel: [phone]     Fax: [phone]  =======================================================    SHILO KOHLER [id number]    Follow up: No fevers. Patient poor historian, initially denied pain then said "I have pain all over". Unable to specify where. Denies any SOB.    Allergies:  No Known Drug Allergies  latex (Hives)      Antibiotics:  acetaminophen     Tablet .. 650 milliGRAM(s) Oral every 6 hours PRN  apixaban 2.5 milliGRAM(s) Oral two times a day  aspirin enteric coated 81 milliGRAM(s) Oral daily  cefepime   IVPB      cefepime   IVPB 1000 milliGRAM(s) IV Intermittent every 24 hours  cloNIDine 0.1 milliGRAM(s) Oral two times a day  dextrose 5%. 1000 milliLiter(s) IV Continuous <Continuous>  dextrose 5%. 1000 milliLiter(s) IV Continuous <Continuous>  dextrose 50% Injectable 12.5 Gram(s) IV Push once  dextrose 50% Injectable 25 Gram(s) IV Push once  dextrose 50% Injectable 25 Gram(s) IV Push once  dextrose Oral Gel 15 Gram(s) Oral once PRN  diltiazem    Tablet 60 milliGRAM(s) Oral three times a day  dronabinol 2.5 milliGRAM(s) Oral two times a day before meals  glucagon  Injectable 1 milliGRAM(s) IntraMuscular once  imipramine 50 milliGRAM(s) Oral daily  insulin lispro (ADMELOG) corrective regimen sliding scale   SubCutaneous three times a day before meals  insulin lispro (ADMELOG) corrective regimen sliding scale   SubCutaneous at bedtime  lidocaine   4% Patch 1 Patch Transdermal daily  metoprolol tartrate 100 milliGRAM(s) Oral two times a day  mirtazapine 7.5 milliGRAM(s) Oral at bedtime  morphine  - Injectable 2 milliGRAM(s) IV Push every 6 hours PRN  multivitamin 1 Tablet(s) Oral daily  pantoprazole    Tablet 40 milliGRAM(s) Oral before breakfast  polyethylene glycol 3350 17 Gram(s) Oral daily  senna 2 Tablet(s) Oral at bedtime  traMADol 50 milliGRAM(s) Oral every 8 hours PRN  traMADol 25 milliGRAM(s) Oral every 6 hours PRN       REVIEW OF SYSTEMS:  Limited 2/2 dementia, as per HPI     Physical Exam:  ICU Vital Signs Last 24 Hrs  T(C): 36.6 (05 Jul 2023 11:57), Max: 37.7 (04 Jul 2023 21:03)  T(F): 97.9 (05 Jul 2023 11:57), Max: 99.8 (04 Jul 2023 21:03)  HR: 68 (05 Jul 2023 11:57) (68 - 88)  BP: 155/76 (05 Jul 2023 11:57) (132/55 - 180/88)  BP(mean): --  ABP: --  ABP(mean): --  RR: 18 (05 Jul 2023 11:57) (18 - 18)  SpO2: 91% (05 Jul 2023 11:57) (91% - 98%)    O2 Parameters below as of 05 Jul 2023 11:57  Patient On (Oxygen Delivery Method): room air      GEN: asleep but easily arousable, not in apparent distress  HEENT: normocephalic and atraumatic.  NECK: Supple.    LUNGS: Normal respiratoty effort  HEART: Regular rate and rhythm   ABDOMEN: Soft, nontender, and nondistended.   EXTREMITIES: No leg edema. no noted RLE edema, RUE AV fistula  NEUROLOGIC: AO x 1  SKIN: stage 2 sacral ulcer    Labs:  07-05    129<L>  |  94<L>  |  54<H>  ----------------------------<  291<H>  5.1   |  22  |  5.20<H>    Ca    9.7      05 Jul 2023 08:28                            11.2   18.89 )-----------( 326      ( 05 Jul 2023 08:28 )             34.0     PT/INR - ( 05 Jul 2023 08:28 )   PT: 16.7 sec;   INR: 1.42 ratio           Urinalysis Basic - ( 05 Jul 2023 08:28 )    Color: x / Appearance: x / SG: x / pH: x  Gluc: 291 mg/dL / Ketone: x  / Bili: x / Urobili: x   Blood: x / Protein: x / Nitrite: x   Leuk Esterase: x / RBC: x / WBC x   Sq Epi: x / Non Sq Epi: x / Bacteria: x          RECENT CULTURES:  07-03 @ 16:10 .Blood Blood     No growth at 24 hours        07-03 @ 16:05 .Blood Blood     No growth at 24 hours              All imaging and data are reviewed.

## 2023-07-05 NOTE — PROGRESS NOTE ADULT - ASSESSMENT
72-year-old female with significant past medical history for hypertension, diabetes, dementia, end-stage renal disease on hemodialysis presents to the ED status post unwitnessed fall from [facility].  Patient states that she heard some voices then rolled out of bed.  Patient complaining of right hip pain.  Unknown head trauma.  + Eliquis < from: Xray Femur showed  Fractures including the medial wall of the acetabulum and inferior right pubic ramus Admitted for pain management ,PT/OT

## 2023-07-05 NOTE — PATIENT CHOICE NOTE. - NSPTCHOICESTATE_GEN_ALL_CORE

## 2023-07-05 NOTE — PROGRESS NOTE ADULT - ASSESSMENT
73YO F PMH hypertension, diabetes, dementia, end-stage renal disease on hemodialysis, who presented status post unwitnessed fall from [facility]- rolled out of bed. Patient complained of right hip pain--found to have fractures including the medial wall of the acetabulum and inferior right pubic ramus.    Patient found to have leukocytosis, could be reactive 2/2 hip fracture, although started on empiric antibiotics on 7/4 given new fever. WBC increased to 18 today, patient poor historian but no obvious signs/symptoms of infection. Blood cultures currently no growth. CXR shows moderate-large L pleural effusion--although she remains on room air and no obvious respiratory symptoms. Urinalysis without pyuria.    #Leukocytosis  #Fever    -continue cefepime 1g IV q24h  -hold off on vancomycin  -follow blood cultures to completion  -monitor WBC--if increasing consider CT chest and A/P  -aspiration precautions  -wound care  -discussed with Dr. Marsha Tsai MD  Division of infectious Diseases  Cell [phone] between 8am and 6pm  After 6pm and over the weekends please call ID service line at [phone].

## 2023-07-05 NOTE — PROGRESS NOTE ADULT - ASSESSMENT
. 7/1/2023 72-year-old female former smoker quit 3/2012  with significant past medical history for hypertension, CAD sp cabg  PVD  diabetes, dementia, end-stage renal disease on hemodialysis   a fib on eliquis sp recent hosp stay 5/17-5/24/2023  admitted 3/7-3/11/2023 and before that 2/22-2/25/2023   . During 5/2023 admission she had   . ENTERORHINOVIRUS INFECTION RESP TRACT 5/17  . PLEURAL EFFUSION SP l THORA 5/18 TRANSUDATE     . Patient now  presented to the ED 7/1/2023 status post unwitnessed fall from [facility].  Patient states that she heard some voices then rolled out of bed.  Patient complaining of right hip pain.  Unknown head trauma.  + Eliquis     She was found to have acetabular fracture which Ortho was contacted and they plan non operative management Pt was admitted to [facility] for bleed as she was on apixaban and was noted to have pl effsn    . 7/1/2023  I was asked to admit pt                      (01 Jul 2023 13:32)      esrd on hd tues thurs sat     ANEMIA PLAN:  Anemia of chronic disease:  Well controlled by Aranesp  H and H subtherapeutic .  We will continue Aranesp aiming for a HCT of 32-36 %.   We will monitor Iron stores, B12 and RBC folate .      BP monitoring,continue current antihypertensive meds, low salt diet,followup with PMD in 1-2 weeks      Accuchecks monitoring and insulin sliding scale coverage, no concentrated sweets diet, serial labs and f/up with PMD, monitor HB A 1 C every 3-4 mnth

## 2023-07-05 NOTE — PROGRESS NOTE ADULT - SUBJECTIVE AND OBJECTIVE BOX
Patient is a 72y Female with a known history of :  Hip fracture [S72.009A]    Closed pelvic fracture [S32.9XXA]    Pubic ramus fracture [S32.599A]    Right acetabular fracture [S32.401A]    ESRD on dialysis [N18.6]    Prophylactic measure [Z29.9]    HTN (hypertension) [I10]    Fall [W19.XXXA]    Pleural effusion [J90]      HPI:  72-year-old female with significant past medical history for hypertension, diabetes, dementia, end-stage renal disease on hemodialysis presents to the ED status post unwitnessed fall from [facility].  Patient states that she heard some voices then rolled out of bed.  Patient complaining of right hip pain.  Unknown head trauma.  + Eliquis < from: Xray Femur showed  Fractures including the medial wall of the acetabulum and inferior right pubic ramus Admitted for pain management ,PT/OT        (01 Jul 2023 15:24)      REVIEW OF SYSTEMS:    CONSTITUTIONAL: No fever, weight loss, or fatigue  EYES: No eye pain, visual disturbances, or discharge  ENMT:  No difficulty hearing, tinnitus, vertigo; No sinus or throat pain  NECK: No pain or stiffness  BREASTS: No pain, masses, or nipple discharge  RESPIRATORY: No cough, wheezing, chills or hemoptysis; No shortness of breath  CARDIOVASCULAR: No chest pain, palpitations, dizziness, or leg swelling  GASTROINTESTINAL: No abdominal or epigastric pain. No nausea, vomiting, or hematemesis; No diarrhea or constipation. No melena or hematochezia.  GENITOURINARY: No dysuria, frequency, hematuria, or incontinence  NEUROLOGICAL: No headaches, memory loss, loss of strength, numbness, or tremors  SKIN: No itching, burning, rashes, or lesions   LYMPH NODES: No enlarged glands  ENDOCRINE: No heat or cold intolerance; No hair loss  MUSCULOSKELETAL: No joint pain or swelling; No muscle, back, or extremity pain  PSYCHIATRIC: No depression, anxiety, mood swings, or difficulty sleeping  HEME/LYMPH: No easy bruising, or bleeding gums  ALLERGY AND IMMUNOLOGIC: No hives or eczema    MEDICATIONS  (STANDING):  apixaban 2.5 milliGRAM(s) Oral two times a day  aspirin enteric coated 81 milliGRAM(s) Oral daily  cefepime   IVPB 1000 milliGRAM(s) IV Intermittent every 24 hours  cefepime   IVPB      cloNIDine 0.1 milliGRAM(s) Oral two times a day  dextrose 5%. 1000 milliLiter(s) (50 mL/Hr) IV Continuous <Continuous>  dextrose 5%. 1000 milliLiter(s) (100 mL/Hr) IV Continuous <Continuous>  dextrose 50% Injectable 12.5 Gram(s) IV Push once  dextrose 50% Injectable 25 Gram(s) IV Push once  dextrose 50% Injectable 25 Gram(s) IV Push once  diltiazem    Tablet 60 milliGRAM(s) Oral three times a day  dronabinol 2.5 milliGRAM(s) Oral two times a day before meals  glucagon  Injectable 1 milliGRAM(s) IntraMuscular once  imipramine 50 milliGRAM(s) Oral daily  insulin lispro (ADMELOG) corrective regimen sliding scale   SubCutaneous three times a day before meals  insulin lispro (ADMELOG) corrective regimen sliding scale   SubCutaneous at bedtime  lidocaine   4% Patch 1 Patch Transdermal daily  metoprolol tartrate 100 milliGRAM(s) Oral two times a day  mirtazapine 7.5 milliGRAM(s) Oral at bedtime  multivitamin 1 Tablet(s) Oral daily  pantoprazole    Tablet 40 milliGRAM(s) Oral before breakfast  polyethylene glycol 3350 17 Gram(s) Oral daily  senna 2 Tablet(s) Oral at bedtime    MEDICATIONS  (PRN):  acetaminophen     Tablet .. 650 milliGRAM(s) Oral every 6 hours PRN Temp greater or equal to 38C (100.4F), Mild Pain (1 - 3)  dextrose Oral Gel 15 Gram(s) Oral once PRN Blood Glucose LESS THAN 70 milliGRAM(s)/deciliter  morphine  - Injectable 2 milliGRAM(s) IV Push every 6 hours PRN Severe Pain (7 - 10)  traMADol 25 milliGRAM(s) Oral every 6 hours PRN Moderate Pain (4 - 6)  traMADol 50 milliGRAM(s) Oral every 8 hours PRN Severe Pain (7 - 10)      ALLERGIES: No Known Drug Allergies  latex (Hives)      FAMILY HISTORY:  FH: myocardial infarction (Father)    FH: myocardial infarction (Father)    Family history of type 2 diabetes mellitus in brother (Sibling)        PHYSICAL EXAMINATION:  -----------------------------  T(C): 37.1 (07-05-23 @ 05:20), Max: 37.7 (07-04-23 @ 21:03)  HR: 88 (07-05-23 @ 05:20) (71 - 88)  BP: 137/73 (07-05-23 @ 05:20) (132/55 - 180/88)  RR: 18 (07-05-23 @ 05:20) (18 - 19)  SpO2: 93% (07-05-23 @ 05:20) (91% - 98%)  Wt(kg): --    07-04 @ 07:01  -  07-05 @ 07:00  --------------------------------------------------------  IN:  Total IN: 0 mL    OUT:    Other (mL): 1500 mL  Total OUT: 1500 mL    Total NET: -1500 mL            VITALS  T(C): 37.1 (07-05-23 @ 05:20), Max: 37.7 (07-04-23 @ 21:03)  HR: 88 (07-05-23 @ 05:20) (71 - 88)  BP: 137/73 (07-05-23 @ 05:20) (132/55 - 180/88)  RR: 18 (07-05-23 @ 05:20) (18 - 19)  SpO2: 93% (07-05-23 @ 05:20) (91% - 98%)    Constitutional: well developed, normal appearance, well groomed, well nourished, no deformities and no acute distress.   Eyes: the conjunctiva exhibited no abnormalities and the eyelids demonstrated no xanthelasmas.   HEENT: normal oral mucosa, no oral pallor and no oral cyanosis.   Neck: normal jugular venous A waves present, normal jugular venous V waves present and no jugular venous wilson A waves.   Pulmonary: no respiratory distress, normal respiratory rhythm and effort, no accessory muscle use and lungs were clear to auscultation bilaterally.   Cardiovascular: heart rate and rhythm were normal, normal S1 and S2 and no murmur, gallop, rub, heave or thrill are present.   Abdomen: soft, non-tender, no hepato-splenomegaly and no abdominal mass palpated.   Musculoskeletal: the gait could not be assessed..   Extremities: no clubbing of the fingernails, no localized cyanosis, no petechial hemorrhages and no ischemic changes.   Skin: normal skin color and pigmentation, no rash, no venous stasis, no skin lesions, no skin ulcer and no xanthoma was observed.   Psychiatric: oriented to person, place, and time, the affect was normal, the mood was normal and not feeling anxious.     LABS:   --------                 Culture Results:   No growth at 24 hours (07-03 @ 16:10)  Culture Results:   No growth at 24 hours (07-03 @ 16:05)      RADIOLOGY:  -----------------    ECG:     ECHO:

## 2023-07-05 NOTE — PROGRESS NOTE ADULT - SUBJECTIVE AND OBJECTIVE BOX
CHIEF COMPLAINT/ REASON FOR VISIT  .. Patient was seen to address the  issue listed under PROBLEM LIST which is located toward bottom of this note     SHILO KOHLER    PLSHARON 3WES 366 D1    Allergies    No Known Drug Allergies  latex (Hives)    Intolerances        PAST MEDICAL & SURGICAL HISTORY:  HTN (hypertension)      h/o Anxiety attack      Depression      h/o Myocardial infarct       h/o Hepatitis A 1969  currently resolved, no symptoms      Murmur, cardiac      h/o Smoking  quitted 3/2012      Anemia secondary to renal failure      ESRD on dialysis      Falls      Ataxia      Type 2 diabetes mellitus      Peripheral vascular disease, unspecified      CAD (coronary artery disease)      Diabetes      coronary stent       s/p Ovarian cyst removal      s/p surgical removal of benign Skin lesion epigastric area      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          FAMILY HISTORY:  FH: myocardial infarction (Father)    FH: myocardial infarction (Father)    Family history of type 2 diabetes mellitus in brother (Sibling)        Home Medications:  acetaminophen 325 mg oral tablet: 2 tab(s) orally every 6 hours as needed (2023 15:24)  Admelog SoloStar 100 units/mL injectable solution: injectable 3 times a day (before meals)sliding scale  (2023 15:24)  apixaban 2.5 mg oral tablet: 1 tab(s) orally 2 times a day (2023 15:24)  aspirin 81 mg oral delayed release tablet: 1 tab(s) orally once a day (at bedtime) (2023 15:24)  atorvastatin 20 mg oral tablet: 1 tab(s) orally once a day (at bedtime) (2023 15:24)  bacitracin 500 units/g topical ointment: Apply topically to affected area once a day to right knee abrasion (2023 11:54)  Basaglar KwikPen 100 units/mL subcutaneous solution: 8 international unit(s) subcutaneous once a day (at bedtime) (2023 15:24)  cloNIDine 0.1 mg oral tablet: 1 tab(s) orally 2 times a day (2023 15:24)  DilTIAZem (Eqv-Cardizem CD) 180 mg/24 hours oral capsule, extended release: 1 cap(s) orally once a day (2023 15:24)  imipramine 50 mg oral tablet: 1 tab(s) orally once a day (in the evening) (2023 15:24)  metoprolol tartrate 100 mg oral tablet: 1 tab(s) orally 2 times a day (2023 15:24)  mirtazapine 7.5 mg oral tablet: 1 tab(s) orally once a day (at bedtime) (2023 15:24)  Nephro-Alfie oral tablet: 1 tab(s) orally once a day (2023 15:24)  PANTOPRAZOLE 40MG DR TAB: 1 tab(s) orally once a day (2023 15:24)  senna leaf extract oral tablet: 2 tab(s) orally once a day (at bedtime) (2023 15:24)      MEDICATIONS  (STANDING):  apixaban 2.5 milliGRAM(s) Oral two times a day  aspirin enteric coated 81 milliGRAM(s) Oral daily  cefepime   IVPB      cefepime   IVPB 1000 milliGRAM(s) IV Intermittent every 24 hours  cloNIDine 0.1 milliGRAM(s) Oral two times a day  dextrose 5%. 1000 milliLiter(s) (50 mL/Hr) IV Continuous <Continuous>  dextrose 5%. 1000 milliLiter(s) (100 mL/Hr) IV Continuous <Continuous>  dextrose 50% Injectable 12.5 Gram(s) IV Push once  dextrose 50% Injectable 25 Gram(s) IV Push once  dextrose 50% Injectable 25 Gram(s) IV Push once  diltiazem    Tablet 60 milliGRAM(s) Oral three times a day  dronabinol 2.5 milliGRAM(s) Oral two times a day before meals  glucagon  Injectable 1 milliGRAM(s) IntraMuscular once  imipramine 50 milliGRAM(s) Oral daily  insulin lispro (ADMELOG) corrective regimen sliding scale   SubCutaneous three times a day before meals  insulin lispro (ADMELOG) corrective regimen sliding scale   SubCutaneous at bedtime  lidocaine   4% Patch 1 Patch Transdermal daily  metoprolol tartrate 100 milliGRAM(s) Oral two times a day  mirtazapine 7.5 milliGRAM(s) Oral at bedtime  multivitamin 1 Tablet(s) Oral daily  pantoprazole    Tablet 40 milliGRAM(s) Oral before breakfast  polyethylene glycol 3350 17 Gram(s) Oral daily  senna 2 Tablet(s) Oral at bedtime    MEDICATIONS  (PRN):  acetaminophen     Tablet .. 650 milliGRAM(s) Oral every 6 hours PRN Temp greater or equal to 38C (100.4F), Mild Pain (1 - 3)  dextrose Oral Gel 15 Gram(s) Oral once PRN Blood Glucose LESS THAN 70 milliGRAM(s)/deciliter  morphine  - Injectable 2 milliGRAM(s) IV Push every 6 hours PRN Severe Pain (7 - 10)  traMADol 25 milliGRAM(s) Oral every 6 hours PRN Moderate Pain (4 - 6)  traMADol 50 milliGRAM(s) Oral every 8 hours PRN Severe Pain (7 - 10)              Vital Signs Last 24 Hrs  T(C): 37.1 (2023 05:20), Max: 37.7 (2023 21:03)  T(F): 98.8 (2023 05:20), Max: 99.8 (2023 21:03)  HR: 88 (2023 05:20) (71 - 88)  BP: 137/73 (2023 05:20) (132/55 - 180/88)  BP(mean): --  RR: 18 (2023 05:20) (18 - 19)  SpO2: 93% (2023 05:20) (91% - 98%)    Parameters below as of 2023 05:20  Patient On (Oxygen Delivery Method): room air          23 @ 07:01  -  23 @ 05:45  --------------------------------------------------------  IN: 0 mL / OUT: 1500 mL / NET: -1500 mL              LABS:                        11.2   13.94 )-----------( 274      ( 2023 07:30 )             35.4     07-03    133<L>  |  94<L>  |  67<H>  ----------------------------<  237<H>  4.9   |  30  |  5.80<H>    Ca    9.1      2023 07:30    TPro  7.1  /  Alb  2.8<L>  /  TBili  0.4  /  DBili  x   /  AST  14<L>  /  ALT  17  /  AlkPhos  116      PT/INR - ( 2023 07:30 )   PT: 12.9 sec;   INR: 1.10 ratio           Urinalysis Basic - ( 2023 21:20 )    Color: Dark Yellow / Appearance: Turbid / S.015 / pH: x  Gluc: x / Ketone: Negative mg/dL  / Bili: Negative / Urobili: 0.2 mg/dL   Blood: x / Protein: 300 mg/dL / Nitrite: Negative   Leuk Esterase: Large / RBC: 12 /HPF / WBC 5 /HPF   Sq Epi: x / Non Sq Epi: x / Bacteria: Too Numerous to count /HPF            WBC:  WBC Count: 13.94 K/uL ( @ 07:30)  WBC Count: 14.36 K/uL ( @ 09:01)  WBC Count: 15.57 K/uL ( @ 10:00)      MICROBIOLOGY:  RECENT CULTURES:   .Blood Blood XXXX XXXX   No growth at 24 hours     .Blood Blood XXXX XXXX   No growth at 24 hours                PT/INR - ( 2023 07:30 )   PT: 12.9 sec;   INR: 1.10 ratio             Sodium:  Sodium: 133 mmol/L ( @ 07:30)  Sodium: 132 mmol/L ( @ 09:01)  Sodium: 135 mmol/L ( @ 10:00)      5.80 mg/dL  @ 07:30  4.10 mg/dL  @ 09:01  5.00 mg/dL  @ 10:00      Hemoglobin:  Hemoglobin: 11.2 g/dL ( @ 07:30)  Hemoglobin: 11.8 g/dL ( @ 09:01)  Hemoglobin: 13.1 g/dL ( @ 10:00)      Platelets: Platelet Count - Automated: 274 K/uL ( @ 07:30)  Platelet Count - Automated: 265 K/uL ( @ 09:01)  Platelet Count - Automated: 281 K/uL ( @ 10:00)      LIVER FUNCTIONS - ( 2023 07:30 )  Alb: 2.8 g/dL / Pro: 7.1 g/dL / ALK PHOS: 116 U/L / ALT: 17 U/L / AST: 14 U/L / GGT: x             Urinalysis Basic - ( 2023 21:20 )    Color: Dark Yellow / Appearance: Turbid / S.015 / pH: x  Gluc: x / Ketone: Negative mg/dL  / Bili: Negative / Urobili: 0.2 mg/dL   Blood: x / Protein: 300 mg/dL / Nitrite: Negative   Leuk Esterase: Large / RBC: 12 /HPF / WBC 5 /HPF   Sq Epi: x / Non Sq Epi: x / Bacteria: Too Numerous to count /HPF        RADIOLOGY & ADDITIONAL STUDIES:      MICROBIOLOGY:  RECENT CULTURES:   .Blood Blood XXXX XXXX   No growth at 24 hours     .Blood Blood XXXX XXXX   No growth at 24 hours

## 2023-07-05 NOTE — PROGRESS NOTE ADULT - ASSESSMENT
REASON FOR VISIT  .. Management of problems listed below     NOTEWORTHY FINDINGS.     PHYSICAL EXAM   HEENT Unremarkable atraumatic   RESP Fair air entry Harsh breath sound   CARDIAC S1 S2 No S3 NO JVD   ABDOMEN No hepatosplenomegaly   PEDAL EDEMA present No calf tenderness  NO rash       GENERAL DATA .     GOC.     ICU STAY.   .. none  COVID.   .. 7/4 scv2 (-)     BEST PRACTICE ISSUES.   HOB ELEVATN.   .. Yes  DVT PPLX.   .. 7/3/2023 apixa 2.5 x2 restarted as IR feels we should tap fluid only if she develops shortness of breath  .. 7/2/2023 Apixaba 2.5 x2 held for thorac   STRESS ULCER PPLX.   ..   INFECTION PPLX.   ..   SP SW AMINAH.   ..   DIET.   .. 7/1/2023 renal restrns   IV fl.  ..   PROCEDURES.  ..   PAST PROCEDURES.   ..     GENERAL DATA .     ALLGY.  .. latex   WT.  .. 7/1/2023 54  BMI.  .. 7/1/2023 17       ABGS.    VS/ PO/IO/ VENT/ DRIPS.   7/5/2023 afeb 88 130/70   7/5/2023 ra 91%     CC/DOA.   . 7/1/2023 Pt sent from [facility] for unwitnessed fall out of bed.  .  PRESENTATION.   . 7/1/2023 72-year-old female former smoker quit 3/2012 with PMH for hypertension, CAD sp cabg PVD  sp R-> L bifem - fem BK pop bypass Fem pop bypass 6/21/2022 Partial ray amputation r great toe 6/22/2022 diabetes, dementia, ESRD on hemodialysis   a fib on eliquis sp recent hosp stay 5/17-5/24/2023   . ENTERORHINOVIRUS INFECTION RESP TRACT 5/17  . PLEURAL EFFUSION SP l THORA 5/18 TRANSUDATE     COURSE   . ACETABULAR Fx Ortho recommended non surgical mgmt  . PL EFFSN Was decided to hold off thora unless she develops sob    PROBLEMS/ASSESSMENT/RECOMMENDATIONS (A/R).  PULMONARY.  . GAS EXCHANGE.  .. A/R.   .. Monitor pulse ox and target po 90-95%    . PLEURAL EFFSN.  .. RELEVANT PAST HISTORU  .. 5/18/2023 l thorac 1.5 l   .. 5/18 pl fl l 13 m 73 p 5 afb sm (-) g 131 l 102/268 (.38) ph 7.6 pr 2.6/6.4 (.4)  .. Fluid transudate  .. WORKUP   .. CXR 7/1/2023 cxr Mod large l pl effs or lower zone airspace consolidation/atelecatsis   .. CT ch 7/1/2023 Ct ch   .... Decreased mod l effs since 5/17/2023   .. EVENTS  .. 7/2/2023 DW pt and dw son consensus is that they want fluid remived   .. 7/3/2023 dw ir plan changed plan is to tap if she becomes symptimatic   .. A/R  .. 7/3/2023 Thora cancelled as pt is comfortable will pan thora if she becomes symptomatic     ID.  .. WORKUP.  .. W 7/1-7/2-7/3-7/5/2023 W 15 - 14 - 13 - 18   .. cxr 7/3 l effsn infiltr  .. MICRO.  .. Flu ab 7/5/2023 (-)   .. rsv 7/5/2023 (-)   .. bc 7/3 (-)   .. uc 7/3 100K gnr   .. ABIO.  .. 7/4 cefepime   .. A/R   .. Leukocytosis likely sec to stress of hip fx  .. was startd on cefepime by id   .. await id of      CARDIO.  . CAD.  .. 7/1/2023 ASA 81   .. A/R  .. cont rx    . A fib.  .. 7/1/2023 CARDIZEM 60.3   .. 7/3/2023 apixaba 2.5 x2   .. AR  .. Cont rx    HEMAT.  .. WORKUP.  .. Hb 7/1-7/2-7/3-7/5/2023 Hb 13 - 11.8 - 11.2 - 11.2   .. INR 7/1/2023    .. A/R  .. As pt was on apixaba will monitorserially     RENAL.  . ESRD.  .. WORKUP  .. NA 7/1/2023    .. K 7/1/2023 K 5.1   .. CR 7/1/2023 CR 5    ORTHO.  . FALL 7/1/2023.  .. CT head C sp 7/1/2023 CT HC (-)     . FRACTURE ACETABULUM r INFERIOR PUBIC RAMUS r 7/1/2023   .. IMAGING.  .. XR Pelvis and r hip 7/1/2023 XR Fractures r acetabulum and inf r pubic ramus   .. 7/1/2023 ct pelvix   .... comminuted r acetabular fx involving r sup and inf pubic rami medial wallacetabulum sup acetabulum and post wall acetabulum   .... small hematoma r pelvic sidewall   .. ORTHO.  .. 7/1/2023 DW Dr Dias She spoke to Ortho Dr Jenkins group and plan is for nonsurgical management  .. A/R  .. Monitor serial Hb ro bleed     . MAIN ISSUES  .. FX acetabulum on conservative rx  . A fib on doac  . ESRD   . Pl effs for thora when sob  . empiric abio started 7/4    TIME SPENT   . About 36 minutes aggregate care time spent on encounter; activities included direct patient care, counseling and/or coordinating care reviewing notes, lab data/ imaging , discussion with multidisciplinary team/ patient /family and explaining in detail risks, benefits, alternatives of the recommendations     JOSE F LAO 72 f7/1/2023 1948 DR HARRY ALBRIGHT

## 2023-07-06 ENCOUNTER — TRANSCRIPTION ENCOUNTER (OUTPATIENT)
Age: 73
End: 2023-07-06

## 2023-07-06 LAB
ANION GAP SERPL CALC-SCNC: 13 MMOL/L — SIGNIFICANT CHANGE UP (ref 5–17)
BUN SERPL-MCNC: 88 MG/DL — HIGH (ref 7–23)
CALCIUM SERPL-MCNC: 9.7 MG/DL — SIGNIFICANT CHANGE UP (ref 8.5–10.1)
CHLORIDE SERPL-SCNC: 94 MMOL/L — LOW (ref 96–108)
CO2 SERPL-SCNC: 24 MMOL/L — SIGNIFICANT CHANGE UP (ref 22–31)
CREAT SERPL-MCNC: 6.7 MG/DL — HIGH (ref 0.5–1.3)
EGFR: 6 ML/MIN/1.73M2 — LOW
GLUCOSE SERPL-MCNC: 279 MG/DL — HIGH (ref 70–99)
HCT VFR BLD CALC: 34.6 % — SIGNIFICANT CHANGE UP (ref 34.5–45)
HGB BLD-MCNC: 11.1 G/DL — LOW (ref 11.5–15.5)
MCHC RBC-ENTMCNC: 30.7 PG — SIGNIFICANT CHANGE UP (ref 27–34)
MCHC RBC-ENTMCNC: 32.1 GM/DL — SIGNIFICANT CHANGE UP (ref 32–36)
MCV RBC AUTO: 95.8 FL — SIGNIFICANT CHANGE UP (ref 80–100)
NRBC # BLD: 0 /100 WBCS — SIGNIFICANT CHANGE UP (ref 0–0)
PLATELET # BLD AUTO: 364 K/UL — SIGNIFICANT CHANGE UP (ref 150–400)
POTASSIUM SERPL-MCNC: 5.2 MMOL/L — SIGNIFICANT CHANGE UP (ref 3.5–5.3)
POTASSIUM SERPL-SCNC: 5.2 MMOL/L — SIGNIFICANT CHANGE UP (ref 3.5–5.3)
RBC # BLD: 3.61 M/UL — LOW (ref 3.8–5.2)
RBC # FLD: 16.4 % — HIGH (ref 10.3–14.5)
SODIUM SERPL-SCNC: 131 MMOL/L — LOW (ref 135–145)
WBC # BLD: 13.16 K/UL — HIGH (ref 3.8–10.5)
WBC # FLD AUTO: 13.16 K/UL — HIGH (ref 3.8–10.5)

## 2023-07-06 PROCEDURE — 99232 SBSQ HOSP IP/OBS MODERATE 35: CPT

## 2023-07-06 PROCEDURE — 99233 SBSQ HOSP IP/OBS HIGH 50: CPT

## 2023-07-06 RX ADMIN — SENNA PLUS 2 TABLET(S): 8.6 TABLET ORAL at 21:13

## 2023-07-06 RX ADMIN — CEFEPIME 100 MILLIGRAM(S): 1 INJECTION, POWDER, FOR SOLUTION INTRAMUSCULAR; INTRAVENOUS at 15:02

## 2023-07-06 RX ADMIN — Medication 100 MILLIGRAM(S): at 05:21

## 2023-07-06 RX ADMIN — PANTOPRAZOLE SODIUM 40 MILLIGRAM(S): 20 TABLET, DELAYED RELEASE ORAL at 05:22

## 2023-07-06 RX ADMIN — Medication 3: at 08:11

## 2023-07-06 RX ADMIN — Medication 1: at 17:49

## 2023-07-06 RX ADMIN — TRAMADOL HYDROCHLORIDE 50 MILLIGRAM(S): 50 TABLET ORAL at 10:07

## 2023-07-06 RX ADMIN — Medication 2.5 MILLIGRAM(S): at 17:53

## 2023-07-06 RX ADMIN — MIRTAZAPINE 7.5 MILLIGRAM(S): 45 TABLET, ORALLY DISINTEGRATING ORAL at 21:13

## 2023-07-06 RX ADMIN — Medication 60 MILLIGRAM(S): at 05:21

## 2023-07-06 RX ADMIN — Medication 2.5 MILLIGRAM(S): at 05:21

## 2023-07-06 RX ADMIN — APIXABAN 2.5 MILLIGRAM(S): 2.5 TABLET, FILM COATED ORAL at 05:21

## 2023-07-06 RX ADMIN — Medication 2: at 21:59

## 2023-07-06 RX ADMIN — Medication 60 MILLIGRAM(S): at 21:13

## 2023-07-06 RX ADMIN — TRAMADOL HYDROCHLORIDE 50 MILLIGRAM(S): 50 TABLET ORAL at 11:07

## 2023-07-06 RX ADMIN — Medication 0.1 MILLIGRAM(S): at 17:49

## 2023-07-06 RX ADMIN — Medication 100 MILLIGRAM(S): at 17:49

## 2023-07-06 RX ADMIN — Medication 0.1 MILLIGRAM(S): at 05:21

## 2023-07-06 RX ADMIN — APIXABAN 2.5 MILLIGRAM(S): 2.5 TABLET, FILM COATED ORAL at 17:49

## 2023-07-06 NOTE — PROGRESS NOTE ADULT - SUBJECTIVE AND OBJECTIVE BOX
CHIEF COMPLAINT/ REASON FOR VISIT  .. Patient was seen to address the  issue listed under PROBLEM LIST which is located toward bottom of this note     SHILO KOHLER    PLSHARON 3WES 366 D1    Allergies    No Known Drug Allergies  latex (Hives)    Intolerances        PAST MEDICAL & SURGICAL HISTORY:  HTN (hypertension)      h/o Anxiety attack      Depression      h/o Myocardial infarct 2007      h/o Hepatitis A 1969  currently resolved, no symptoms      Murmur, cardiac      h/o Smoking  quitted 3/2012      Anemia secondary to renal failure      ESRD on dialysis      Falls      Ataxia      Type 2 diabetes mellitus      Peripheral vascular disease, unspecified      CAD (coronary artery disease)      Diabetes      coronary stent 2007      s/p Ovarian cyst removal      s/p surgical removal of benign Skin lesion epigastric area      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          FAMILY HISTORY:  FH: myocardial infarction (Father)    FH: myocardial infarction (Father)    Family history of type 2 diabetes mellitus in brother (Sibling)        Home Medications:  acetaminophen 325 mg oral tablet: 2 tab(s) orally every 6 hours as needed (02 Jul 2023 15:24)  Admelog SoloStar 100 units/mL injectable solution: injectable 3 times a day (before meals)sliding scale  (02 Jul 2023 15:24)  apixaban 2.5 mg oral tablet: 1 tab(s) orally 2 times a day (02 Jul 2023 15:24)  aspirin 81 mg oral delayed release tablet: 1 tab(s) orally once a day (at bedtime) (02 Jul 2023 15:24)  atorvastatin 20 mg oral tablet: 1 tab(s) orally once a day (at bedtime) (02 Jul 2023 15:24)  bacitracin 500 units/g topical ointment: Apply topically to affected area once a day to right knee abrasion (02 Jul 2023 11:54)  Basaglar KwikPen 100 units/mL subcutaneous solution: 8 international unit(s) subcutaneous once a day (at bedtime) (02 Jul 2023 15:24)  cloNIDine 0.1 mg oral tablet: 1 tab(s) orally 2 times a day (02 Jul 2023 15:24)  DilTIAZem (Eqv-Cardizem CD) 180 mg/24 hours oral capsule, extended release: 1 cap(s) orally once a day (02 Jul 2023 15:24)  imipramine 50 mg oral tablet: 1 tab(s) orally once a day (in the evening) (02 Jul 2023 15:24)  metoprolol tartrate 100 mg oral tablet: 1 tab(s) orally 2 times a day (02 Jul 2023 15:24)  mirtazapine 7.5 mg oral tablet: 1 tab(s) orally once a day (at bedtime) (02 Jul 2023 15:24)  Nephro-Alfie oral tablet: 1 tab(s) orally once a day (02 Jul 2023 15:24)  PANTOPRAZOLE 40MG DR TAB: 1 tab(s) orally once a day (02 Jul 2023 15:24)  senna leaf extract oral tablet: 2 tab(s) orally once a day (at bedtime) (02 Jul 2023 15:24)      MEDICATIONS  (STANDING):  apixaban 2.5 milliGRAM(s) Oral two times a day  aspirin enteric coated 81 milliGRAM(s) Oral daily  cefepime   IVPB      cefepime   IVPB 1000 milliGRAM(s) IV Intermittent every 24 hours  cloNIDine 0.1 milliGRAM(s) Oral two times a day  dextrose 5%. 1000 milliLiter(s) (100 mL/Hr) IV Continuous <Continuous>  dextrose 5%. 1000 milliLiter(s) (50 mL/Hr) IV Continuous <Continuous>  dextrose 50% Injectable 12.5 Gram(s) IV Push once  dextrose 50% Injectable 25 Gram(s) IV Push once  dextrose 50% Injectable 25 Gram(s) IV Push once  diltiazem    Tablet 60 milliGRAM(s) Oral three times a day  dronabinol 2.5 milliGRAM(s) Oral two times a day before meals  glucagon  Injectable 1 milliGRAM(s) IntraMuscular once  imipramine 50 milliGRAM(s) Oral daily  insulin lispro (ADMELOG) corrective regimen sliding scale   SubCutaneous three times a day before meals  insulin lispro (ADMELOG) corrective regimen sliding scale   SubCutaneous at bedtime  lidocaine   4% Patch 1 Patch Transdermal daily  metoprolol tartrate 100 milliGRAM(s) Oral two times a day  mirtazapine 7.5 milliGRAM(s) Oral at bedtime  multivitamin 1 Tablet(s) Oral daily  pantoprazole    Tablet 40 milliGRAM(s) Oral before breakfast  polyethylene glycol 3350 17 Gram(s) Oral daily  senna 2 Tablet(s) Oral at bedtime    MEDICATIONS  (PRN):  acetaminophen     Tablet .. 650 milliGRAM(s) Oral every 6 hours PRN Temp greater or equal to 38C (100.4F), Mild Pain (1 - 3)  dextrose Oral Gel 15 Gram(s) Oral once PRN Blood Glucose LESS THAN 70 milliGRAM(s)/deciliter  morphine  - Injectable 2 milliGRAM(s) IV Push every 6 hours PRN Severe Pain (7 - 10)  traMADol 25 milliGRAM(s) Oral every 6 hours PRN Moderate Pain (4 - 6)  traMADol 50 milliGRAM(s) Oral every 8 hours PRN Severe Pain (7 - 10)              Vital Signs Last 24 Hrs  T(C): 37 (06 Jul 2023 04:54), Max: 37 (06 Jul 2023 04:54)  T(F): 98.6 (06 Jul 2023 04:54), Max: 98.6 (06 Jul 2023 04:54)  HR: 79 (06 Jul 2023 04:54) (68 - 93)  BP: 149/64 (06 Jul 2023 04:54) (149/64 - 160/78)  BP(mean): --  RR: 18 (06 Jul 2023 04:54) (18 - 18)  SpO2: 93% (06 Jul 2023 04:54) (91% - 97%)    Parameters below as of 06 Jul 2023 04:54  Patient On (Oxygen Delivery Method): room air          07-04-23 @ 07:01  -  07-05-23 @ 07:00  --------------------------------------------------------  IN: 0 mL / OUT: 1500 mL / NET: -1500 mL              LABS:                        11.2   18.89 )-----------( 326      ( 05 Jul 2023 08:28 )             34.0     07-05    129<L>  |  94<L>  |  54<H>  ----------------------------<  291<H>  5.1   |  22  |  5.20<H>    Ca    9.7      05 Jul 2023 08:28      PT/INR - ( 05 Jul 2023 08:28 )   PT: 16.7 sec;   INR: 1.42 ratio           Urinalysis Basic - ( 05 Jul 2023 08:28 )    Color: x / Appearance: x / SG: x / pH: x  Gluc: 291 mg/dL / Ketone: x  / Bili: x / Urobili: x   Blood: x / Protein: x / Nitrite: x   Leuk Esterase: x / RBC: x / WBC x   Sq Epi: x / Non Sq Epi: x / Bacteria: x            WBC:  WBC Count: 18.89 K/uL (07-05 @ 08:28)  WBC Count: 13.94 K/uL (07-03 @ 07:30)  WBC Count: 14.36 K/uL (07-02 @ 09:01)      MICROBIOLOGY:  RECENT CULTURES:  07-03 Clean Catch Clean Catch (Midstream) XXXX XXXX   >100,000 CFU/ml Pseudomonas aeruginosa  50,000 - 99,000 CFU/mL Klebsiella pneumoniae    07-03 .Blood Blood XXXX XXXX   No growth at 48 Hours    07-03 .Blood Blood XXXX XXXX   No growth at 48 Hours                PT/INR - ( 05 Jul 2023 08:28 )   PT: 16.7 sec;   INR: 1.42 ratio             Sodium:  Sodium: 129 mmol/L (07-05 @ 08:28)  Sodium: 133 mmol/L (07-03 @ 07:30)  Sodium: 132 mmol/L (07-02 @ 09:01)      5.20 mg/dL 07-05 @ 08:28  5.80 mg/dL 07-03 @ 07:30  4.10 mg/dL 07-02 @ 09:01      Hemoglobin:  Hemoglobin: 11.2 g/dL (07-05 @ 08:28)  Hemoglobin: 11.2 g/dL (07-03 @ 07:30)  Hemoglobin: 11.8 g/dL (07-02 @ 09:01)      Platelets: Platelet Count - Automated: 326 K/uL (07-05 @ 08:28)  Platelet Count - Automated: 274 K/uL (07-03 @ 07:30)  Platelet Count - Automated: 265 K/uL (07-02 @ 09:01)          Urinalysis Basic - ( 05 Jul 2023 08:28 )    Color: x / Appearance: x / SG: x / pH: x  Gluc: 291 mg/dL / Ketone: x  / Bili: x / Urobili: x   Blood: x / Protein: x / Nitrite: x   Leuk Esterase: x / RBC: x / WBC x   Sq Epi: x / Non Sq Epi: x / Bacteria: x        RADIOLOGY & ADDITIONAL STUDIES:      MICROBIOLOGY:  RECENT CULTURES:  07-03 Clean Catch Clean Catch (Midstream) XXXX XXXX   >100,000 CFU/ml Pseudomonas aeruginosa  50,000 - 99,000 CFU/mL Klebsiella pneumoniae    07-03 .Blood Blood XXXX XXXX   No growth at 48 Hours    07-03 .Blood Blood XXXX XXXX   No growth at 48 Hours

## 2023-07-06 NOTE — DISCHARGE NOTE PROVIDER - CARE PROVIDER_API CALL
Kwaku Bridges  Infectious Disease  [address] [place] [address]  Phone: [phone]  Fax: [phone]  Follow Up Time: 1 week    Manuelito Oneill)  Critical Care Medicine; HospicePalliative Medicine; Internal Medicine; Pulmonary Disease  [address] [place] [address]  Phone: [phone]  Fax: [phone]  Follow Up Time: 1 week    Joseluis James  Cardiovascular Disease  [address] [facility], [address] [place] [id number]  Phone: [phone]  Fax: [phone]  Follow Up Time: 1 month

## 2023-07-06 NOTE — PROGRESS NOTE ADULT - ASSESSMENT
72-year-old female with significant past medical history for hypertension, diabetes, dementia, end-stage renal disease on hemodialysis presents to the ED status post unwitnessed fall from [facility].  Patient states that she heard some voices then rolled out of bed.  Patient complaining of right hip pain.  Unknown head trauma.  + Eliquis < from: Xray Femur showed  Fractures including the medial wall of the acetabulum and inferior right pubic ramus Admitted for pain management ,PT/OT

## 2023-07-06 NOTE — CHART NOTE - NSCHARTNOTEFT_GEN_A_CORE
Nutrition follow up ( chart reviewed, events noted) Brief hx :      Factors impacting intake: [ ] none [ ] nausea  [ ] vomiting [ ] diarrhea [ ] constipation  [ ]chewing problems [ ] swallowing issues  [ ] other:     Diet Presciption:   Intake:     Current Weight: Weight (kg): 54.4 (07-01 @ 06:57)  % Weight Change    Pertinent Medications: MEDICATIONS  (STANDING):  apixaban 2.5 milliGRAM(s) Oral two times a day  aspirin enteric coated 81 milliGRAM(s) Oral daily  cefepime   IVPB      cefepime   IVPB 1000 milliGRAM(s) IV Intermittent every 24 hours  cloNIDine 0.1 milliGRAM(s) Oral two times a day  dextrose 5%. 1000 milliLiter(s) (50 mL/Hr) IV Continuous <Continuous>  dextrose 5%. 1000 milliLiter(s) (100 mL/Hr) IV Continuous <Continuous>  dextrose 50% Injectable 12.5 Gram(s) IV Push once  dextrose 50% Injectable 25 Gram(s) IV Push once  dextrose 50% Injectable 25 Gram(s) IV Push once  diltiazem    Tablet 60 milliGRAM(s) Oral three times a day  dronabinol 2.5 milliGRAM(s) Oral two times a day before meals  glucagon  Injectable 1 milliGRAM(s) IntraMuscular once  imipramine 50 milliGRAM(s) Oral daily  insulin lispro (ADMELOG) corrective regimen sliding scale   SubCutaneous three times a day before meals  insulin lispro (ADMELOG) corrective regimen sliding scale   SubCutaneous at bedtime  lidocaine   4% Patch 1 Patch Transdermal daily  metoprolol tartrate 100 milliGRAM(s) Oral two times a day  mirtazapine 7.5 milliGRAM(s) Oral at bedtime  multivitamin 1 Tablet(s) Oral daily  pantoprazole    Tablet 40 milliGRAM(s) Oral before breakfast  polyethylene glycol 3350 17 Gram(s) Oral daily  senna 2 Tablet(s) Oral at bedtime    MEDICATIONS  (PRN):  acetaminophen     Tablet .. 650 milliGRAM(s) Oral every 6 hours PRN Temp greater or equal to 38C (100.4F), Mild Pain (1 - 3)  dextrose Oral Gel 15 Gram(s) Oral once PRN Blood Glucose LESS THAN 70 milliGRAM(s)/deciliter  morphine  - Injectable 2 milliGRAM(s) IV Push every 6 hours PRN Severe Pain (7 - 10)  traMADol 25 milliGRAM(s) Oral every 6 hours PRN Moderate Pain (4 - 6)  traMADol 50 milliGRAM(s) Oral every 8 hours PRN Severe Pain (7 - 10)    Pertinent Labs: 07-06 Na131 mmol/L<L> Glu 279 mg/dL<H> K+ 5.2 mmol/L Cr  6.70 mg/dL<H> BUN 88 mg/dL<H> 07-03 Alb 2.8 g/dL<L>     CAPILLARY BLOOD GLUCOSE      POCT Blood Glucose.: 274 mg/dL (06 Jul 2023 07:56)  POCT Blood Glucose.: 209 mg/dL (05 Jul 2023 21:51)  POCT Blood Glucose.: 239 mg/dL (05 Jul 2023 17:21)  POCT Blood Glucose.: 318 mg/dL (05 Jul 2023 12:10)    Skin:     Estimated Needs:   [ ] no change since previous assessment  [ ] recalculated:     Previous Nutrition Diagnosis:   [ ] Inadequate Energy Intake [ ]Inadequate Oral Intake [ ] Excessive Energy Intake   [ ] Underweight [ ] Increased Nutrient Needs [ ] Overweight/Obesity   [ ] Altered GI Function [ ] Unintended Weight Loss [ ] Food & Nutrition Related Knowledge Deficit [ ] Malnutrition     Nutrition Diagnosis is [ ] ongoing  [ ] resolved [ ] not applicable     New Nutrition Diagnosis: [ ] not applicable       Interventions:   Recommend  [ ] Change Diet To:  [ ] Nutrition Supplement  [ ] Nutrition Support  [ ] Other:     Monitoring and Evaluation:   [ ] PO intake [ x ] Tolerance to diet prescription [ x ] weights [ x ] labs[ x ] follow up per protocol  [ ] other: Nutrition follow up ( chart reviewed, events noted) Brief hx :  · Assessment	  72-year-old female with significant past medical history for hypertension, diabetes, dementia, end-stage renal disease on hemodialysis presents to the ED status post unwitnessed fall from [facility].  Patient states that she heard some voices then rolled out of bed.  Patient complaining of right hip pain.  Unknown head trauma.  + Eliquis < from: Xray Femur showed  Fractures including the medial wall of the acetabulum and inferior right pubic ramus Admitted for pain management ,PT/OT        Nutritional Assessment:  · Nutritional Assessment	This patient has been assessed with a concern for Malnutrition and has been determined to have a diagnosis/diagnoses of Moderate protein-calorie malnutrition.    This patient is being managed with:   Diet Minced and Moist-  Consistent Carbohydrate {Evening Snack}  Moderately Thick Liquids (MODTHICKLIQS)  For patients receiving Renal Replacement - No Protein Restr No Conc K No Conc Phos Low Sodium (RENAL)  Supplement Feeding Modality:  Oral  Nepro Cans or Servings Per Day:  1       Frequency:  Daily  Entered: Jul 4 2023  1:16PM     Problem/Plan - 1:  ·  Problem: Acute febrile illness.   ·  Plan: Blood cultures currently no growth. CXR shows moderate-large L pleural effusion--although she remains on room air and no obvious respiratory symptoms. Urinalysis without pyuria.  -continue cefepime 1g IV q24h ,s/p vanco  -follow blood cultures to completion  -monitor WBC--if increasing consider CT chest and A/P  -aspiration precautions  -wound care  -discussed with Dr. Tsai ,ID cons.     Problem/Plan - 2:  ·  Problem: Fall.   ·  Plan: Fall precautions .,PT/OT ,NWB RLE.     Problem/Plan - 3:  ·  Problem: Closed pelvic fracture.   ·  Plan: pain management , pt /ot ,no WB RLE ,case d/w ortho team -ok to resume OAC , monitor h/h closely.     Problem/Plan - 4:  ·  Problem: Pubic ramus fracture.   ·  Plan: Pain management ,OT/PT.     Problem/Plan - 5:  ·  Problem: Right acetabular fracture.   ·  Plan: pain management ,PT/OT.     Problem/Plan - 6:  ·  Problem: HTN (hypertension).   ·  Plan: continue home medications.     Problem/Plan - 7:  ·  Problem: ESRD on dialysis.   ·  Plan: HD as per nephrologist.     Problem/Plan - 8:  ·  Problem: Pleural effusion.   ·  Plan: thoracocentesis if symptomatic -as per pulm cons.     Problem/Plan - 9:  ·  Problem: Prophylactic measure.   ·  Plan: Gastrointestinal stress ulcer prophylaxis and DVT prophylaxis administered.      Electronic Signatures:  Marsha,    Factors impacting intake: [ ] none [ ] nausea  [ ] vomiting [ ] diarrhea [ ] constipation  [ ]chewing problems [ ] swallowing issues  [ ] other:     Diet Presciption:   Intake:     Current Weight: Weight (kg): 54.4 (07-01 @ 06:57)  % Weight Change    Pertinent Medications: MEDICATIONS  (STANDING):  apixaban 2.5 milliGRAM(s) Oral two times a day  aspirin enteric coated 81 milliGRAM(s) Oral daily  cefepime   IVPB      cefepime   IVPB 1000 milliGRAM(s) IV Intermittent every 24 hours  cloNIDine 0.1 milliGRAM(s) Oral two times a day  dextrose 5%. 1000 milliLiter(s) (50 mL/Hr) IV Continuous <Continuous>  dextrose 5%. 1000 milliLiter(s) (100 mL/Hr) IV Continuous <Continuous>  dextrose 50% Injectable 12.5 Gram(s) IV Push once  dextrose 50% Injectable 25 Gram(s) IV Push once  dextrose 50% Injectable 25 Gram(s) IV Push once  diltiazem    Tablet 60 milliGRAM(s) Oral three times a day  dronabinol 2.5 milliGRAM(s) Oral two times a day before meals  glucagon  Injectable 1 milliGRAM(s) IntraMuscular once  imipramine 50 milliGRAM(s) Oral daily  insulin lispro (ADMELOG) corrective regimen sliding scale   SubCutaneous three times a day before meals  insulin lispro (ADMELOG) corrective regimen sliding scale   SubCutaneous at bedtime  lidocaine   4% Patch 1 Patch Transdermal daily  metoprolol tartrate 100 milliGRAM(s) Oral two times a day  mirtazapine 7.5 milliGRAM(s) Oral at bedtime  multivitamin 1 Tablet(s) Oral daily  pantoprazole    Tablet 40 milliGRAM(s) Oral before breakfast  polyethylene glycol 3350 17 Gram(s) Oral daily  senna 2 Tablet(s) Oral at bedtime    MEDICATIONS  (PRN):  acetaminophen     Tablet .. 650 milliGRAM(s) Oral every 6 hours PRN Temp greater or equal to 38C (100.4F), Mild Pain (1 - 3)  dextrose Oral Gel 15 Gram(s) Oral once PRN Blood Glucose LESS THAN 70 milliGRAM(s)/deciliter  morphine  - Injectable 2 milliGRAM(s) IV Push every 6 hours PRN Severe Pain (7 - 10)  traMADol 25 milliGRAM(s) Oral every 6 hours PRN Moderate Pain (4 - 6)  traMADol 50 milliGRAM(s) Oral every 8 hours PRN Severe Pain (7 - 10)    Pertinent Labs: 07-06 Na131 mmol/L<L> Glu 279 mg/dL<H> K+ 5.2 mmol/L Cr  6.70 mg/dL<H> BUN 88 mg/dL<H> 07-03 Alb 2.8 g/dL<L>     CAPILLARY BLOOD GLUCOSE      POCT Blood Glucose.: 274 mg/dL (06 Jul 2023 07:56)  POCT Blood Glucose.: 209 mg/dL (05 Jul 2023 21:51)  POCT Blood Glucose.: 239 mg/dL (05 Jul 2023 17:21)  POCT Blood Glucose.: 318 mg/dL (05 Jul 2023 12:10)    Skin:     Estimated Needs:   [ ] no change since previous assessment  [ ] recalculated:     Previous Nutrition Diagnosis:   [ ] Inadequate Energy Intake [ ]Inadequate Oral Intake [ ] Excessive Energy Intake   [ ] Underweight [ ] Increased Nutrient Needs [ ] Overweight/Obesity   [ ] Altered GI Function [ ] Unintended Weight Loss [ ] Food & Nutrition Related Knowledge Deficit [ ] Malnutrition     Nutrition Diagnosis is [ ] ongoing  [ ] resolved [ ] not applicable     New Nutrition Diagnosis: [ ] not applicable       Interventions:   Recommend  [ ] Change Diet To:  [ ] Nutrition Supplement  [ ] Nutrition Support  [ ] Other:     Monitoring and Evaluation:   [ ] PO intake [ x ] Tolerance to diet prescription [ x ] weights [ x ] labs[ x ] follow up per protocol  [ ] other: Nutrition follow up ( chart reviewed, events noted) Brief hx :  72-year-old female with significant past medical history for hypertension, diabetes, dementia, end-stage renal disease on hemodialysis presents to the ED status [facility].  Patient states that she heard some voices then rolled out of bed.  Patient complaining of right hip pain.  Unknown head trauma.  + Eliquis < from: Xray Femur showed  Fractures including the medial wall of the acetabulum and inferior right pubic ramus Admitted for pain management ,PT/OT  dx with acute febrile illness. CXR shows moderate-large L pleural effusion. Urinalysis without pyuria.    At time of RD visit bedside this am , pt sleeping soundly in contracted position . Per nsg, pt receives total feeds and is eating very well    Factors impacting intake: [ ] none [ ] nausea  [ ] vomiting [ ] diarrhea [ ] constipation  [ ]chewing problems [X ] swallowing issues  [X ] other: total feed assistance    Diet Prescription: consistent CHO ( evening snack) renal mod thick liquids with Nepro 8 oz po daily   Intake: %    Current Weight: Weight (kg): 54.4 (07-01 @ 06:57) 56.9 kg ( 7/5/23)     pertinent Medications: MEDICATIONS  (STANDING):  apixaban 2.5 milliGRAM(s) Oral two times a day  aspirin enteric coated 81 milliGRAM(s) Oral daily  cefepime   IVPB      cefepime   IVPB 1000 milliGRAM(s) IV Intermittent every 24 hours  cloNIDine 0.1 milliGRAM(s) Oral two times a day  dextrose 5%. 1000 milliLiter(s) (50 mL/Hr) IV Continuous <Continuous>  dextrose 5%. 1000 milliLiter(s) (100 mL/Hr) IV Continuous <Continuous>  dextrose 50% Injectable 12.5 Gram(s) IV Push once  dextrose 50% Injectable 25 Gram(s) IV Push once  dextrose 50% Injectable 25 Gram(s) IV Push once  diltiazem    Tablet 60 milliGRAM(s) Oral three times a day  dronabinol 2.5 milliGRAM(s) Oral two times a day before meals  glucagon  Injectable 1 milliGRAM(s) IntraMuscular once  imipramine 50 milliGRAM(s) Oral daily  insulin lispro (ADMELOG) corrective regimen sliding scale   SubCutaneous three times a day before meals  insulin lispro (ADMELOG) corrective regimen sliding scale   SubCutaneous at bedtime  lidocaine   4% Patch 1 Patch Transdermal daily  metoprolol tartrate 100 milliGRAM(s) Oral two times a day  mirtazapine 7.5 milliGRAM(s) Oral at bedtime  multivitamin 1 Tablet(s) Oral daily  pantoprazole    Tablet 40 milliGRAM(s) Oral before breakfast  polyethylene glycol 3350 17 Gram(s) Oral daily  senna 2 Tablet(s) Oral at bedtime    MEDICATIONS  (PRN):  acetaminophen     Tablet .. 650 milliGRAM(s) Oral every 6 hours PRN Temp greater or equal to 38C (100.4F), Mild Pain (1 - 3)  dextrose Oral Gel 15 Gram(s) Oral once PRN Blood Glucose LESS THAN 70 milliGRAM(s)/deciliter  morphine  - Injectable 2 milliGRAM(s) IV Push every 6 hours PRN Severe Pain (7 - 10)  traMADol 25 milliGRAM(s) Oral every 6 hours PRN Moderate Pain (4 - 6)  traMADol 50 milliGRAM(s) Oral every 8 hours PRN Severe Pain (7 - 10)    Pertinent Labs: 07-06 Na131 mmol/L<L> Glu 279 mg/dL<H> K+ 5.2 mmol/L Cr  6.70 mg/dL<H> BUN 88 mg/dL<H> 07-03 Alb 2.8 g/dL<L>     CAPILLARY BLOOD GLUCOSE      POCT Blood Glucose.: 274 mg/dL (06 Jul 2023 07:56)  POCT Blood Glucose.: 209 mg/dL (05 Jul 2023 21:51)  POCT Blood Glucose.: 239 mg/dL (05 Jul 2023 17:21)  POCT Blood Glucose.: 318 mg/dL (05 Jul 2023 12:10)    Skin: stage II coccyx PI  edema: none  BM : 7/5/23    Estimated Needs:   [X ] no change since previous assessment, based on dry wt of ~ 53.3 kg ( 30-35 kcals/kg) 5124-1833 kcals and ( 1.2-1.4 gm pro/kg) 65-75 gm pro       Previous Nutrition Diagnosis:    [X ] Increased Nutrient Needs ( in view of HD and ^ needs for wound healing )  [ X] Malnutrition , moderate in context of chronic illness    Nutrition Diagnosis is [X ] ongoing  [ ] resolved [ ] not applicable     New Nutrition Diagnosis: [X ] not applicable       Interventions:   Recommend  [ ] Change Diet To:  [ ] Nutrition Supplement  [ ] Nutrition Support  [ x] Other: D/C multivitamin w/ minerals 1 tab po daily, order nephrovite 1 tab po daily    Monitoring and Evaluation:   [X ] PO intake [ x ] Tolerance to diet prescription [ x ] weights [ x ] labs[ x ] follow up per protocol  [ X] other:skin integrity

## 2023-07-06 NOTE — DISCHARGE NOTE PROVIDER - DETAILS OF MALNUTRITION DIAGNOSIS/DIAGNOSES
This patient has been assessed with a concern for Malnutrition and was treated during this hospitalization for the following Nutrition diagnosis/diagnoses:     -  07/03/2023: Moderate protein-calorie malnutrition

## 2023-07-06 NOTE — PROGRESS NOTE ADULT - SUBJECTIVE AND OBJECTIVE BOX
[facility]  INFECTIOUS DISEASES - [address], [place] [address]  Tel: [phone]     Fax: [phone]  =======================================================    SHILO KOHLER [id number]    Follow up: No fevers. Seen earlier today. When asked, reported pain "all over" but unable to quantify where.    Allergies:  No Known Drug Allergies  latex (Hives)      Antibiotics:  acetaminophen     Tablet .. 650 milliGRAM(s) Oral every 6 hours PRN  apixaban 2.5 milliGRAM(s) Oral two times a day  aspirin enteric coated 81 milliGRAM(s) Oral daily  cefepime   IVPB      cefepime   IVPB 1000 milliGRAM(s) IV Intermittent every 24 hours  cloNIDine 0.1 milliGRAM(s) Oral two times a day  dextrose 5%. 1000 milliLiter(s) IV Continuous <Continuous>  dextrose 5%. 1000 milliLiter(s) IV Continuous <Continuous>  dextrose 50% Injectable 12.5 Gram(s) IV Push once  dextrose 50% Injectable 25 Gram(s) IV Push once  dextrose 50% Injectable 25 Gram(s) IV Push once  dextrose Oral Gel 15 Gram(s) Oral once PRN  diltiazem    Tablet 60 milliGRAM(s) Oral three times a day  dronabinol 2.5 milliGRAM(s) Oral two times a day before meals  glucagon  Injectable 1 milliGRAM(s) IntraMuscular once  imipramine 50 milliGRAM(s) Oral daily  insulin lispro (ADMELOG) corrective regimen sliding scale   SubCutaneous three times a day before meals  insulin lispro (ADMELOG) corrective regimen sliding scale   SubCutaneous at bedtime  lidocaine   4% Patch 1 Patch Transdermal daily  metoprolol tartrate 100 milliGRAM(s) Oral two times a day  mirtazapine 7.5 milliGRAM(s) Oral at bedtime  morphine  - Injectable 2 milliGRAM(s) IV Push every 6 hours PRN  pantoprazole    Tablet 40 milliGRAM(s) Oral before breakfast  polyethylene glycol 3350 17 Gram(s) Oral daily  senna 2 Tablet(s) Oral at bedtime  traMADol 25 milliGRAM(s) Oral every 6 hours PRN  traMADol 50 milliGRAM(s) Oral every 8 hours PRN       REVIEW OF SYSTEMS:  unable to obtain 2/2 dementia     Physical Exam:  ICU Vital Signs Last 24 Hrs  T(C): 37.4 (06 Jul 2023 20:51), Max: 37.4 (06 Jul 2023 20:51)  T(F): 99.3 (06 Jul 2023 20:51), Max: 99.3 (06 Jul 2023 20:51)  HR: 84 (06 Jul 2023 20:51) (71 - 84)  BP: 170/79 (06 Jul 2023 20:51) (149/64 - 178/-)  BP(mean): --  ABP: --  ABP(mean): --  RR: 18 (06 Jul 2023 20:51) (18 - 18)  SpO2: 95% (06 Jul 2023 20:51) (93% - 98%)    O2 Parameters below as of 06 Jul 2023 20:51  Patient On (Oxygen Delivery Method): room air      GEN: awake, not in distress  HEENT: normocephalic and atraumatic.  NECK: Supple.    LUNGS: Normal respiratoty effort  HEART: Regular rate and rhythm   ABDOMEN: Soft, nontender, and nondistended.   EXTREMITIES: No leg edema, RUE AV fistula  NEUROLOGIC: AO x 1      Labs:  07-06    131<L>  |  94<L>  |  88<H>  ----------------------------<  279<H>  5.2   |  24  |  6.70<H>    Ca    9.7      06 Jul 2023 07:50                            11.1   13.16 )-----------( 364      ( 06 Jul 2023 07:50 )             34.6     PT/INR - ( 05 Jul 2023 08:28 )   PT: 16.7 sec;   INR: 1.42 ratio           Urinalysis Basic - ( 06 Jul 2023 07:50 )    Color: x / Appearance: x / SG: x / pH: x  Gluc: 279 mg/dL / Ketone: x  / Bili: x / Urobili: x   Blood: x / Protein: x / Nitrite: x   Leuk Esterase: x / RBC: x / WBC x   Sq Epi: x / Non Sq Epi: x / Bacteria: x          RECENT CULTURES:  07-03 @ 21:20 Clean Catch Clean Catch (Midstream)     >100,000 CFU/ml Pseudomonas aeruginosa  50,000 - 99,000 CFU/mL Klebsiella pneumoniae        07-03 @ 16:10 .Blood Blood     No growth at 72 Hours        07-03 @ 16:05 .Blood Blood     No growth at 72 Hours              All imaging and data are reviewed.

## 2023-07-06 NOTE — PROGRESS NOTE ADULT - SUBJECTIVE AND OBJECTIVE BOX
PROGRESS NOTE  Patient is a 72y old  Female who presents with a chief complaint of s/p fall (06 Jul 2023 08:26)    Chart and available morning labs /imaging are reviewed electronically , urgent issues addressed . More information  is being added upon completion of rounds , when more information is collected and management discussed with consultants , medical staff and social service/case management on the floor   OVERNIGHT  No new issues reported by medical staff . All above noted   Patient is resting in a bed comfortably .Confused ,poor mentation .No distress noted   HPI:  72-year-old female with significant past medical history for hypertension, diabetes, dementia, end-stage renal disease on hemodialysis presents to the ED status post unwitnessed fall from [facility].  Patient states that she heard some voices then rolled out of bed.  Patient complaining of right hip pain.  Unknown head trauma.  + Eliquis < from: Xray Femur showed  Fractures including the medial wall of the acetabulum and inferior right pubic ramus Admitted for pain management ,PT/OT        (01 Jul 2023 15:24)    PAST MEDICAL & SURGICAL HISTORY:  HTN (hypertension)      h/o Anxiety attack      Depression      h/o Myocardial infarct 2007      h/o Hepatitis A 1969  currently resolved, no symptoms      Murmur, cardiac      h/o Smoking  quitted 3/2012      Anemia secondary to renal failure      ESRD on dialysis      Falls      Ataxia      Type 2 diabetes mellitus      Peripheral vascular disease, unspecified      CAD (coronary artery disease)      Diabetes      coronary stent 2007      s/p Ovarian cyst removal      s/p surgical removal of benign Skin lesion epigastric area      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 1000 milliLiter(s) (50 mL/Hr) IV Continuous <Continuous>  dextrose 5%. 1000 milliLiter(s) (100 mL/Hr) IV Continuous <Continuous>  dextrose 50% Injectable 12.5 Gram(s) IV Push once  dextrose 50% Injectable 25 Gram(s) IV Push once  dextrose 50% Injectable 25 Gram(s) IV Push once  diltiazem    Tablet 60 milliGRAM(s) Oral three times a day  dronabinol 2.5 milliGRAM(s) Oral two times a day before meals  glucagon  Injectable 1 milliGRAM(s) IntraMuscular once  imipramine 50 milliGRAM(s) Oral daily  insulin lispro (ADMELOG) corrective regimen sliding scale   SubCutaneous three times a day before meals  insulin lispro (ADMELOG) corrective regimen sliding scale   SubCutaneous at bedtime  lidocaine   4% Patch 1 Patch Transdermal daily  metoprolol tartrate 100 milliGRAM(s) Oral two times a day  mirtazapine 7.5 milliGRAM(s) Oral at bedtime  pantoprazole    Tablet 40 milliGRAM(s) Oral before breakfast  polyethylene glycol 3350 17 Gram(s) Oral daily  senna 2 Tablet(s) Oral at bedtime    MEDICATIONS  (PRN):  acetaminophen     Tablet .. 650 milliGRAM(s) Oral every 6 hours PRN Temp greater or equal to 38C (100.4F), Mild Pain (1 - 3)  dextrose Oral Gel 15 Gram(s) Oral once PRN Blood Glucose LESS THAN 70 milliGRAM(s)/deciliter  morphine  - Injectable 2 milliGRAM(s) IV Push every 6 hours PRN Severe Pain (7 - 10)  traMADol 50 milliGRAM(s) Oral every 8 hours PRN Severe Pain (7 - 10)  traMADol 25 milliGRAM(s) Oral every 6 hours PRN Moderate Pain (4 - 6)      OBJECTIVE    T(C): 37 (07-06-23 @ 10:40), Max: 37.2 (07-06-23 @ 10:32)  HR: 75 (07-06-23 @ 10:40) (71 - 93)  BP: 178/- (07-06-23 @ 10:40) (149/64 - 178/-)  RR: 18 (07-06-23 @ 10:40) (18 - 18)  SpO2: 98% (07-06-23 @ 10:40) (93% - 98%)  Wt(kg): --  I&O's Summary        REVIEW OF SYSTEMS:  CONSTITUTIONAL: No fever, weight loss, or fatigue  EYES: No eye pain, visual disturbances, or discharge  ENMT:   No sinus or throat pain  NECK: No pain or stiffness  RESPIRATORY: No cough, wheezing, chills or hemoptysis; No shortness of breath  CARDIOVASCULAR: No chest pain, palpitations, dizziness, or leg swelling  GASTROINTESTINAL: No abdominal pain. No nausea, vomiting; No diarrhea or constipation. No melena or hematochezia.  GENITOURINARY: No dysuria, frequency, hematuria, or incontinence  NEUROLOGICAL: No headaches, memory loss, loss of strength, numbness, or tremors  SKIN: No itching, burning, rashes, or lesions   MUSCULOSKELETAL: No joint pain or swelling; No muscle, back, or extremity pain    PHYSICAL EXAM:  Appearance: NAD. VS past 24 hrs -as above   HEENT:   Moist oral mucosa. Conjunctiva clear b/l.   Neck : supple  Respiratory: Lungs CTAB.  Gastrointestinal:  Soft, nontender. No rebound. No rigidity. BS present	  Cardiovascular: RRR ,S1S2 present  Neurologic: Non-focal. Moving all extremities.  Extremities: No edema. No erythema. No calf tenderness.  Skin: No rashes, No ecchymoses, No cyanosis.	  wounds ,skin lesions-See skin assesment flow sheet   LABS:                        11.1   13.16 )-----------( 364      ( 06 Jul 2023 07:50 )             34.6     07-06    131<L>  |  94<L>  |  88<H>  ----------------------------<  279<H>  5.2   |  24  |  6.70<H>    Ca    9.7      06 Jul 2023 07:50      CAPILLARY BLOOD GLUCOSE      POCT Blood Glucose.: 148 mg/dL (06 Jul 2023 13:14)  POCT Blood Glucose.: 274 mg/dL (06 Jul 2023 07:56)  POCT Blood Glucose.: 209 mg/dL (05 Jul 2023 21:51)  POCT Blood Glucose.: 239 mg/dL (05 Jul 2023 17:21)    PT/INR - ( 05 Jul 2023 08:28 )   PT: 16.7 sec;   INR: 1.42 ratio           Urinalysis Basic - ( 06 Jul 2023 07:50 )    Color: x / Appearance: x / SG: x / pH: x  Gluc: 279 mg/dL / Ketone: x  / Bili: x / Urobili: x   Blood: x / Protein: x / Nitrite: x   Leuk Esterase: x / RBC: x / WBC x   Sq Epi: x / Non Sq Epi: x / Bacteria: x        Culture - Urine (collected 03 Jul 2023 21:20)  Source: Clean Catch Clean Catch (Midstream)  Preliminary Report (05 Jul 2023 23:22):    >100,000 CFU/ml Pseudomonas aeruginosa    50,000 - 99,000 CFU/mL Klebsiella pneumoniae    Culture - Blood (collected 03 Jul 2023 16:10)  Source: .Blood Blood  Preliminary Report (06 Jul 2023 01:02):    No growth at 48 Hours    Culture - Blood (collected 03 Jul 2023 16:05)  Source: .Blood Blood  Preliminary Report (06 Jul 2023 01:02):    No growth at 48 Hours      RADIOLOGY & ADDITIONAL TESTS:   reviewed elctronically  ASSESSMENT/PLAN: 	    25 minutes aggregate time was spent on this visit, 50% visit time spent in care co-ordination with other attendings and counselling patient .I have discussed care plan with patient / HCP/family member son on a phone  ,who expressed understanding of problems treatment and their effect and side effects, alternatives in details. I have asked if they have any questions and concerns and appropriately addressed them to best of my ability. ACP-Advance care planning was discussed , pallitaive care issues ,CMO ,GOC ,MOLST  form ,advance directives were reviewed .All questions were answered to the best of my knowledge - 25 min spent.

## 2023-07-06 NOTE — DISCHARGE NOTE PROVIDER - HOSPITAL COURSE
Problem/Plan - 1:  ·  Problem: Acute febrile illness.   ·  Plan: Blood cultures currently no growth. CXR shows moderate-large L pleural effusion--although she remains on room air and no obvious respiratory symptoms. Urinalysis without pyuria.  -continue cefepime 1g IV q24h ,s/p vanco  -follow blood cultures to completion  -monitor WBC--if increasing consider CT chest and A/P  -aspiration precautions  -wound care  -discussed with Dr. Tsai ,ID cons.    Problem/Plan - 2:  ·  Problem: Fall.   ·  Plan: Fall precautions .,PT/OT ,NWB RLE.    Problem/Plan - 3:  ·  Problem: Closed pelvic fracture.   ·  Plan: pain management , pt /ot ,no WB RLE ,case d/w ortho team -ok to resume OAC , monitor h/h closely.    Problem/Plan - 4:  ·  Problem: Pubic ramus fracture.   ·  Plan: Pain management ,OT/PT.    Problem/Plan - 5:  ·  Problem: Right acetabular fracture.   ·  Plan: pain management ,PT/OT.    Problem/Plan - 6:  ·  Problem: HTN (hypertension).   ·  Plan: continue home medications.    Problem/Plan - 7:  ·  Problem: ESRD on dialysis.   ·  Plan: HD as per nephrologist.    Problem/Plan - 8:  ·  Problem: Pleural effusion.   ·  Plan: thoracocentesis if symptomatic -as per pulm cons.    Problem/Plan - 9:  ·  Problem: Prophylactic measure.

## 2023-07-06 NOTE — DISCHARGE NOTE PROVIDER - NSDCMRMEDTOKEN_GEN_ALL_CORE_FT
acetaminophen 325 mg oral tablet: 2 tab(s) orally every 6 hours as needed  Admelog SoloStar 100 units/mL injectable solution: injectable 3 times a day (before meals)sliding scale   apixaban 2.5 mg oral tablet: 1 tab(s) orally 2 times a day  aspirin 81 mg oral delayed release tablet: 1 tab(s) orally once a day (at bedtime)  atorvastatin 20 mg oral tablet: 1 tab(s) orally once a day (at bedtime)  bacitracin 500 units/g topical ointment: Apply topically to affected area once a day to right knee abrasion  Basaglar KwikPen 100 units/mL subcutaneous solution: 8 international unit(s) subcutaneous once a day (at bedtime)  cloNIDine 0.1 mg oral tablet: 1 tab(s) orally 2 times a day  DilTIAZem (Eqv-Cardizem CD) 180 mg/24 hours oral capsule, extended release: 1 cap(s) orally once a day  imipramine 50 mg oral tablet: 1 tab(s) orally once a day (in the evening)  metoprolol tartrate 100 mg oral tablet: 1 tab(s) orally 2 times a day  mirtazapine 7.5 mg oral tablet: 1 tab(s) orally once a day (at bedtime)  Nephro-Alfie oral tablet: 1 tab(s) orally once a day  PANTOPRAZOLE 40MG DR TAB: 1 tab(s) orally once a day  senna leaf extract oral tablet: 2 tab(s) orally once a day (at bedtime)   acetaminophen 325 mg oral tablet: 2 tab(s) orally every 6 hours as needed  Admelog SoloStar 100 units/mL injectable solution: injectable 3 times a day (before meals)sliding scale   aspirin 81 mg oral delayed release tablet: 1 tab(s) orally once a day (at bedtime)  Basaglar KwikPen 100 units/mL subcutaneous solution: 8 international unit(s) subcutaneous once a day (at bedtime)  DilTIAZem (Eqv-Cardizem CD) 180 mg/24 hours oral capsule, extended release: 1 cap(s) orally once a day  droNABinol 2.5 mg oral capsule: 1 cap(s) orally 2 times a day (before meals)  imipramine 50 mg oral tablet: 1 tab(s) orally once a day (in the evening)  metoprolol tartrate 100 mg oral tablet: 1 tab(s) orally 2 times a day  midodrine 5 mg oral tablet: 1 tab(s) orally every 8 hours  mirtazapine 7.5 mg oral tablet: 1 tab(s) orally once a day (at bedtime)  Nephro-Alfie oral tablet: 1 tab(s) orally once a day  ocular lubricant ophthalmic solution: 1 drop(s) to each affected eye every 12 hours  PANTOPRAZOLE 40MG DR TAB: 1 tab(s) orally once a day  polyethylene glycol 3350 oral powder for reconstitution: 17 gram(s) orally once a day  senna leaf extract oral tablet: 2 tab(s) orally once a day (at bedtime)  traMADol 50 mg oral tablet: 0.5 tab(s) orally 3 times a day As needed Moderate Pain (4 - 6)  traMADol 50 mg oral tablet: 1 tab(s) orally 2 times a day As needed Severe Pain (7 - 10)

## 2023-07-06 NOTE — DISCHARGE NOTE PROVIDER - NSDCCAREPROVSEEN_GEN_ALL_CORE_FT
Jacob, Joseluis Gilman, Agus Pierre, Nirav Sanders, Bob Larson, Diamond Landeros, Art Chanel, Dang Khoa Weil, Patricia

## 2023-07-06 NOTE — DISCHARGE NOTE PROVIDER - PROVIDER TOKENS
PROVIDER:[TOKEN:[[id number]],FOLLOWUP:[1 week]],PROVIDER:[TOKEN:[[id number]],FOLLOWUP:[1 week]],PROVIDER:[TOKEN:[[id number]],FOLLOWUP:[1 month]]

## 2023-07-06 NOTE — PROGRESS NOTE ADULT - ASSESSMENT
73YO F PMH hypertension, diabetes, dementia, end-stage renal disease on hemodialysis, who presented status post unwitnessed fall from [facility] [place]- rolled out of bed. Patient complained of right hip pain--found to have fractures including the medial wall of the acetabulum and inferior right pubic ramus.    Patient found to have leukocytosis, could be reactive 2/2 hip fracture, although started on empiric antibiotics on 7/4 given new fever. She remains afebrile and WBC improved to 13 today. Urine culture growing klebsiella and pseudomonas--potential source of fever/leukocytosis (?). Blood cultures remain no growth.    #Leukocytosis  #Fever    -continue cefepime 1g IV q24h pending pseudomonas sensitivities  -follow cultures to completion  -monitor WBC  -aspiration precautions  -wound care  -discussed with Dr. Marsha Tsai MD  Division of infectious Diseases  Cell [phone] between 8am and 6pm  After 6pm and over the weekends please call ID service line at [phone].

## 2023-07-06 NOTE — PROGRESS NOTE ADULT - SUBJECTIVE AND OBJECTIVE BOX
Patient is a 72y Female whom presented to the hospital with esrd on hd     PAST MEDICAL & SURGICAL HISTORY:  HTN (hypertension)      h/o Anxiety attack      Depression      h/o Myocardial infarct 2007      h/o Hepatitis A 1969  currently resolved, no symptoms      Murmur, cardiac      h/o Smoking  quitted 3/2012      Anemia secondary to renal failure      ESRD on dialysis      Falls      Ataxia      Type 2 diabetes mellitus      Peripheral vascular disease, unspecified      CAD (coronary artery disease)      Diabetes      coronary stent 2007      s/p Ovarian cyst removal      s/p surgical removal of benign Skin lesion epigastric area      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 650 milliGRAM(s) Oral every 6 hours  aspirin enteric coated 81 milliGRAM(s) Oral daily  cloNIDine 0.1 milliGRAM(s) Oral two times a day  dextrose 5%. 1000 milliLiter(s) (50 mL/Hr) IV Continuous <Continuous>  dextrose 5%. 1000 milliLiter(s) (100 mL/Hr) IV Continuous <Continuous>  dextrose 50% Injectable 25 Gram(s) IV Push once  dextrose 50% Injectable 12.5 Gram(s) IV Push once  dextrose 50% Injectable 25 Gram(s) IV Push once  diltiazem    Tablet 60 milliGRAM(s) Oral three times a day  dronabinol 2.5 milliGRAM(s) Oral two times a day before meals  glucagon  Injectable 1 milliGRAM(s) IntraMuscular once  imipramine 50 milliGRAM(s) Oral daily  insulin lispro (ADMELOG) corrective regimen sliding scale   SubCutaneous three times a day before meals  metoprolol tartrate 100 milliGRAM(s) Oral two times a day  mirtazapine 7.5 milliGRAM(s) Oral at bedtime  multivitamin 1 Tablet(s) Oral daily  pantoprazole    Tablet 40 milliGRAM(s) Oral before breakfast  senna 2 Tablet(s) Oral at bedtime  sodium chloride 0.45%. 1000 milliLiter(s) (50 mL/Hr) IV Continuous <Continuous>      Allergies    No Known Drug Allergies  latex (Hives)    Intolerances        SOCIAL HISTORY:  Denies ETOh,Smoking,     FAMILY HISTORY:  FH: myocardial infarction (Father)    FH: myocardial infarction (Father)    Family history of type 2 diabetes mellitus in brother (Sibling)        REVIEW OF SYSTEMS:    CONSTITUTIONAL: No weakness, fevers or chills  RESPIRATORY: No cough, wheezing, hemoptysis; No shortness of breath  CARDIOVASCULAR: No chest pain or palpitations  GASTROINTESTINAL: No abdominal or epigastric pain. No nausea, vomiting,                                        11.1   13.16 )-----------( 364      ( 06 Jul 2023 07:50 )             34.6       CBC Full  -  ( 06 Jul 2023 07:50 )  WBC Count : 13.16 K/uL  RBC Count : 3.61 M/uL  Hemoglobin : 11.1 g/dL  Hematocrit : 34.6 %  Platelet Count - Automated : 364 K/uL  Mean Cell Volume : 95.8 fl  Mean Cell Hemoglobin : 30.7 pg  Mean Cell Hemoglobin Concentration : 32.1 gm/dL  Auto Neutrophil # : x  Auto Lymphocyte # : x  Auto Monocyte # : x  Auto Eosinophil # : x  Auto Basophil # : x  Auto Neutrophil % : x  Auto Lymphocyte % : x  Auto Monocyte % : x  Auto Eosinophil % : x  Auto Basophil % : x      07-06    131<L>  |  94<L>  |  88<H>  ----------------------------<  279<H>  5.2   |  24  |  6.70<H>    Ca    9.7      06 Jul 2023 07:50        CAPILLARY BLOOD GLUCOSE      POCT Blood Glucose.: 200 mg/dL (06 Jul 2023 17:06)  POCT Blood Glucose.: 148 mg/dL (06 Jul 2023 13:14)  POCT Blood Glucose.: 274 mg/dL (06 Jul 2023 07:56)  POCT Blood Glucose.: 209 mg/dL (05 Jul 2023 21:51)      Vital Signs Last 24 Hrs  T(C): 37 (06 Jul 2023 10:40), Max: 37.2 (06 Jul 2023 10:32)  T(F): 98.6 (06 Jul 2023 10:40), Max: 98.9 (06 Jul 2023 10:32)  HR: 75 (06 Jul 2023 10:40) (71 - 93)  BP: 178/- (06 Jul 2023 10:40) (149/64 - 178/-)  BP(mean): --  RR: 18 (06 Jul 2023 10:40) (18 - 18)  SpO2: 98% (06 Jul 2023 10:40) (93% - 98%)    Parameters below as of 06 Jul 2023 10:40  Patient On (Oxygen Delivery Method): room air        Urinalysis Basic - ( 06 Jul 2023 07:50 )    Color: x / Appearance: x / SG: x / pH: x  Gluc: 279 mg/dL / Ketone: x  / Bili: x / Urobili: x   Blood: x / Protein: x / Nitrite: x   Leuk Esterase: x / RBC: x / WBC x   Sq Epi: x / Non Sq Epi: x / Bacteria: x        PT/INR - ( 05 Jul 2023 08:28 )   PT: 16.7 sec;   INR: 1.42 ratio                   PHYSICAL EXAM:    Constitutional: NAD  HEENT: conjunctive   clear   Neck:  No JVD  Respiratory: CTAB  Cardiovascular: S1 and S2  Gastrointestinal: BS+, soft, NT/ND  Extremities: No peripheral edema  Neurological:  no focal deficits  pos fistula

## 2023-07-06 NOTE — PROGRESS NOTE ADULT - ASSESSMENT
REASON FOR VISIT  .. Management of problems listed below      NOTEWORTHY FINDINGS.     PHYSICAL EXAM    HEENT Unremarkable  atraumatic   RESP Fair air entry  Harsh breath sound   CARDIAC S1 S2 No S3     NO JVD    ABDOMEN No hepatosplenomegaly   PEDAL EDEMA present No calf tenderness  NO rash       GENERAL DATA .     GOC.      ICU STAY.   .. none  COVID.   ..      BEST PRACTICE ISSUES.    HOB ELEVATN.   .. Yes  DVT PPLX.   .. 7/3/2023 apixa 2.5 x2 restarted as IR feels we should tap fluid only if she develops shortness of breath  ..    7/2/2023 Apixaba 2.5 x2 held for thorac   STRESS ULCER PPLX.   ..      INFECTION PPLX.   ..    SP SW AMINAH.    ..        DIET.    ..  7/1/2023 renal restrns   IV fl.  ..      PROCEDURES.  ..   PAST PROCEDURES.    ..     GENERAL DATA .     ALLGY.  ..     latex                     WT.  ..  7/1/2023 54  BMI.  ..    7/1/2023 17     ABGS.    VS/ PO/IO/ VENT/ DRIPS.   7/6/2023 99f 75 160/70   7/6/2023 ra 98%    CC/DOA.        . 7/1/2023 Pt sent from [facility] for unwitnessed fall out of bed.       .  PRESENTATION.   . 7/1/2023 72-year-old female former smoker quit 3/2012  with PMH for hypertension, CAD sp cabg  PVD   sp R-> L bifem - fem BK pop bypass  Fem pop bypass 6/21/2022  Partial ray amputation r great toe 6/22/2022  diabetes, dementia, ESRD on hemodialysis   a fib on eliquis sp recent hosp stay 5/17-5/24/2023     . ENTERORHINOVIRUS INFECTION RESP TRACT 5/17  . PLEURAL EFFUSION SP l THORA 5/18 TRANSUDATE     COURSE   . ACETABULAR Fx Ortho recommended non surgical mgmt  . PL EFFSN Was decided to hold off thora unless she develops sob    PROBLEMS/ASSESSMENT/RECOMMENDATIONS (A/R).  PULMONARY.  . GAS EXCHANGE.  .. A/R.   .. Monitor pulse ox and target po 90-95%    . PLEURAL EFFSN.  .. RELEVANT PAST HISTORU  .. 5/18/2023 l thorac 1.5 l   .. 5/18 pl fl l 13 m 73 p 5 afb sm (-) g 131 l 102/268 (.38) ph 7.6 pr 2.6/6.4 (.4)  .. Fluid transudate  .. WORKUP   .. CXR 7/1/2023 cxr Mod large l pl effs or lower zone airspace consolidation/atelecatsis   .. CT ch 7/1/2023 Ct ch   .... Decreased mod l effs since 5/17/2023   .. EVENTS  .. 7/2/2023 DW pt and dw son consensus is that they want fluid remived   .. 7/3/2023 dw ir plan changed plan is to tap if she becomes symptimatic   .. A/R  .. 7/3/2023 Thora cancelled as pt is comfortable will pan thora if she becomes symptomatic     ID.  .. WORKUP.  .. W 7/1-7/2-7/3-7/6/2023 W 15 - 14 - 13- 13  .. A/R   .. Leukocytosis likely sec to stress of hip fx  .. 7/1/2023 Checlk blood and urine cs     CARDIO.  . CAD.  .. 7/1/2023 ASA 81   .. A/R  .. cont rx    . A fib.  .. 7/1/2023 CARDIZEM 60.3   .. 7/3/2023 apixaba 2.5 x2   .. AR  .. Cont rx    HEMAT.  .. WORKUP.  .. Hb 7/1-7/2-7/3-7/6/2023 Hb 13 - 11.8 - 11.2 - 11  .. INR 7/1/2023    .. A/R  .. As pt was on apixaba will monitorserially     RENAL.  . ESRD.  .. WORKUP  .. NA 7/1/2023    .. K 7/1/2023 K 5.1   .. CR 7/1/2023 CR 5    ORTHO.  . FALL 7/1/2023.  .. CT head C sp 7/1/2023 CT HC (-)     . FRACTURE ACETABULUM r INFERIOR PUBIC RAMUS r 7/1/2023   .. IMAGING.  .. XR Pelvis and r hip 7/1/2023 XR Fractures r acetabulum and inf r pubic ramus   .. 7/1/2023 ct pelvix   .... comminuted r acetabular fx involving r sup and inf pubic rami medial wallacetabulum sup acetabulum and post wall acetabulum   .... small hematoma r pelvic sidewall   .. ORTHO.  .. 7/1/2023 KWABENA Dias She spoke to Ortho Dr Jenkins group and plan is for nonsurgical management  .. A/R  .. Monitor serial Hb ro bleed     . MAIN ISSUES  .. FX acetabulum on conservative rx  . A fib on doac  . ESRD   . Pl effs for thora when sob    TIME SPENT   . About 36 minutes aggregate care time spent on encounter; activities included   direct patient care, counseling and/or coordinating care reviewing notes, lab data/ imaging , discussion with multidisciplinary team/ patient  /family and explaining in detail risks, benefits, alternatives  of the recommendations     JOSE F LAO 72 f7/1/2023 1948 DR HARRY ALBRIGHT

## 2023-07-06 NOTE — PROGRESS NOTE ADULT - SUBJECTIVE AND OBJECTIVE BOX
Patient is a 72y Female with a known history of :  Hip fracture [S72.009A]    Closed pelvic fracture [S32.9XXA]    Pubic ramus fracture [S32.599A]    Right acetabular fracture [S32.401A]    ESRD on dialysis [N18.6]    Prophylactic measure [Z29.9]    HTN (hypertension) [I10]    Fall [W19.XXXA]    Pleural effusion [J90]    Acute febrile illness [R50.9]      HPI:  72-year-old female with significant past medical history for hypertension, diabetes, dementia, end-stage renal disease on hemodialysis presents to the ED status post unwitnessed fall from [facility].  Patient states that she heard some voices then rolled out of bed.  Patient complaining of right hip pain.  Unknown head trauma.  + Eliquis < from: Xray Femur showed  Fractures including the medial wall of the acetabulum and inferior right pubic ramus Admitted for pain management ,PT/OT        (01 Jul 2023 15:24)      REVIEW OF SYSTEMS:    CONSTITUTIONAL: No fever, weight loss, or fatigue  EYES: No eye pain, visual disturbances, or discharge  ENMT:  No difficulty hearing, tinnitus, vertigo; No sinus or throat pain  NECK: No pain or stiffness  BREASTS: No pain, masses, or nipple discharge  RESPIRATORY: No cough, wheezing, chills or hemoptysis; No shortness of breath  CARDIOVASCULAR: No chest pain, palpitations, dizziness, or leg swelling  GASTROINTESTINAL: No abdominal or epigastric pain. No nausea, vomiting, or hematemesis; No diarrhea or constipation. No melena or hematochezia.  GENITOURINARY: No dysuria, frequency, hematuria, or incontinence  NEUROLOGICAL: No headaches, memory loss, loss of strength, numbness, or tremors  SKIN: No itching, burning, rashes, or lesions   LYMPH NODES: No enlarged glands  ENDOCRINE: No heat or cold intolerance; No hair loss  MUSCULOSKELETAL: No joint pain or swelling; No muscle, back, or extremity pain  PSYCHIATRIC: No depression, anxiety, mood swings, or difficulty sleeping  HEME/LYMPH: No easy bruising, or bleeding gums  ALLERGY AND IMMUNOLOGIC: No hives or eczema    MEDICATIONS  (STANDING):  apixaban 2.5 milliGRAM(s) Oral two times a day  aspirin enteric coated 81 milliGRAM(s) Oral daily  cefepime   IVPB      cefepime   IVPB 1000 milliGRAM(s) IV Intermittent every 24 hours  cloNIDine 0.1 milliGRAM(s) Oral two times a day  dextrose 5%. 1000 milliLiter(s) (100 mL/Hr) IV Continuous <Continuous>  dextrose 5%. 1000 milliLiter(s) (50 mL/Hr) IV Continuous <Continuous>  dextrose 50% Injectable 12.5 Gram(s) IV Push once  dextrose 50% Injectable 25 Gram(s) IV Push once  dextrose 50% Injectable 25 Gram(s) IV Push once  diltiazem    Tablet 60 milliGRAM(s) Oral three times a day  dronabinol 2.5 milliGRAM(s) Oral two times a day before meals  glucagon  Injectable 1 milliGRAM(s) IntraMuscular once  imipramine 50 milliGRAM(s) Oral daily  insulin lispro (ADMELOG) corrective regimen sliding scale   SubCutaneous three times a day before meals  insulin lispro (ADMELOG) corrective regimen sliding scale   SubCutaneous at bedtime  lidocaine   4% Patch 1 Patch Transdermal daily  metoprolol tartrate 100 milliGRAM(s) Oral two times a day  mirtazapine 7.5 milliGRAM(s) Oral at bedtime  multivitamin 1 Tablet(s) Oral daily  pantoprazole    Tablet 40 milliGRAM(s) Oral before breakfast  polyethylene glycol 3350 17 Gram(s) Oral daily  senna 2 Tablet(s) Oral at bedtime    MEDICATIONS  (PRN):  acetaminophen     Tablet .. 650 milliGRAM(s) Oral every 6 hours PRN Temp greater or equal to 38C (100.4F), Mild Pain (1 - 3)  dextrose Oral Gel 15 Gram(s) Oral once PRN Blood Glucose LESS THAN 70 milliGRAM(s)/deciliter  morphine  - Injectable 2 milliGRAM(s) IV Push every 6 hours PRN Severe Pain (7 - 10)  traMADol 25 milliGRAM(s) Oral every 6 hours PRN Moderate Pain (4 - 6)  traMADol 50 milliGRAM(s) Oral every 8 hours PRN Severe Pain (7 - 10)      ALLERGIES: No Known Drug Allergies  latex (Hives)      FAMILY HISTORY:  FH: myocardial infarction (Father)    FH: myocardial infarction (Father)    Family history of type 2 diabetes mellitus in brother (Sibling)        PHYSICAL EXAMINATION:  -----------------------------  T(C): 37 (07-06-23 @ 04:54), Max: 37 (07-06-23 @ 04:54)  HR: 79 (07-06-23 @ 04:54) (68 - 93)  BP: 149/64 (07-06-23 @ 04:54) (149/64 - 160/78)  RR: 18 (07-06-23 @ 04:54) (18 - 18)  SpO2: 93% (07-06-23 @ 04:54) (91% - 97%)  Wt(kg): --        VITALS  T(C): 37 (07-06-23 @ 04:54), Max: 37 (07-06-23 @ 04:54)  HR: 79 (07-06-23 @ 04:54) (68 - 93)  BP: 149/64 (07-06-23 @ 04:54) (149/64 - 160/78)  RR: 18 (07-06-23 @ 04:54) (18 - 18)  SpO2: 93% (07-06-23 @ 04:54) (91% - 97%)    Constitutional: well developed, normal appearance, well groomed, well nourished, no deformities and no acute distress.   Eyes: the conjunctiva exhibited no abnormalities and the eyelids demonstrated no xanthelasmas.   HEENT: normal oral mucosa, no oral pallor and no oral cyanosis.   Neck: normal jugular venous A waves present, normal jugular venous V waves present and no jugular venous wilson A waves.   Pulmonary: no respiratory distress, normal respiratory rhythm and effort, no accessory muscle use and lungs were clear to auscultation bilaterally.   Cardiovascular: heart rate and rhythm were normal, normal S1 and S2 and no murmur, gallop, rub, heave or thrill are present.   Abdomen: soft, non-tender, no hepato-splenomegaly and no abdominal mass palpated.   Musculoskeletal: the gait could not be assessed..   Extremities: no clubbing of the fingernails, no localized cyanosis, no petechial hemorrhages and no ischemic changes.   Skin: normal skin color and pigmentation, no rash, no venous stasis, no skin lesions, no skin ulcer and no xanthoma was observed.   Psychiatric: oriented to person, place, and time, the affect was normal, the mood was normal and not feeling anxious.     LABS:   --------  07-05    129<L>  |  94<L>  |  54<H>  ----------------------------<  291<H>  5.1   |  22  |  5.20<H>    Ca    9.7      05 Jul 2023 08:28                           11.1   13.16 )-----------( 364      ( 06 Jul 2023 07:50 )             34.6     PT/INR - ( 05 Jul 2023 08:28 )   PT: 16.7 sec;   INR: 1.42 ratio                     RADIOLOGY:  -----------------    ECG:     ECHO:

## 2023-07-06 NOTE — PROGRESS NOTE ADULT - ASSESSMENT
. 7/1/2023 72-year-old female former smoker quit 3/2012  with significant past medical history for hypertension, CAD sp cabg  PVD  diabetes, dementia, end-stage renal disease on hemodialysis   a fib on eliquis sp recent hosp stay 5/17-5/24/2023  admitted 3/7-3/11/2023 and before that 2/22-2/25/2023   . During 5/2023 admission she had   . ENTERORHINOVIRUS INFECTION RESP TRACT 5/17  . PLEURAL EFFUSION SP l THORA 5/18 TRANSUDATE     . Patient now  presented to the ED 7/1/2023 status post unwitnessed fall from [facility].  Patient states that she heard some voices then rolled out of bed.  Patient complaining of right hip pain.  Unknown head trauma.  + Eliquis     She was found to have acetabular fracture which Ortho was contacted and they plan non operative management Pt was admitted to [facility] for bleed as she was on apixaban and was noted to have pl effsn    . 7/1/2023  I was asked to admit pt                      (01 Jul 2023 13:32)      esrd on hd tues thurs sat     ANEMIA PLAN:  Anemia of chronic disease:  Well controlled by Aranesp  H and H subtherapeutic .  We will continue Aranesp aiming for a HCT of 32-36 %.   We will monitor Iron stores, B12 and RBC folate .      BP monitoring,continue current antihypertensive meds, low salt diet,followup with PMD in 1-2 weeks      Accuchecks monitoring and insulin sliding scale coverage, no concentrated sweets diet, serial labs and f/up with PMD, monitor HB A 1 C every 3-4 mnth

## 2023-07-06 NOTE — DISCHARGE NOTE PROVIDER - NSDCCPCAREPLAN_GEN_ALL_CORE_FT
PRINCIPAL DISCHARGE DIAGNOSIS  Diagnosis: Closed pelvic fracture  Assessment and Plan of Treatment:       SECONDARY DISCHARGE DIAGNOSES  Diagnosis: Fall  Assessment and Plan of Treatment:     Diagnosis: Pubic ramus fracture  Assessment and Plan of Treatment:     Diagnosis: Closed pelvic fracture  Assessment and Plan of Treatment:     Diagnosis: Acute febrile illness  Assessment and Plan of Treatment:     Diagnosis: ESRD on dialysis  Assessment and Plan of Treatment:     Diagnosis: HTN (hypertension)  Assessment and Plan of Treatment:     Diagnosis: Pleural effusion  Assessment and Plan of Treatment:

## 2023-07-07 DIAGNOSIS — E11.9 TYPE 2 DIABETES MELLITUS WITHOUT COMPLICATIONS: ICD-10-CM

## 2023-07-07 LAB
-  AMIKACIN: SIGNIFICANT CHANGE UP
-  AMIKACIN: SIGNIFICANT CHANGE UP
-  AMOXICILLIN/CLAVULANIC ACID: SIGNIFICANT CHANGE UP
-  AMPICILLIN/SULBACTAM: SIGNIFICANT CHANGE UP
-  AMPICILLIN: SIGNIFICANT CHANGE UP
-  AZTREONAM: SIGNIFICANT CHANGE UP
-  AZTREONAM: SIGNIFICANT CHANGE UP
-  CEFAZOLIN: SIGNIFICANT CHANGE UP
-  CEFEPIME: SIGNIFICANT CHANGE UP
-  CEFEPIME: SIGNIFICANT CHANGE UP
-  CEFTAZIDIME/AVIBACTAM: SIGNIFICANT CHANGE UP
-  CEFTAZIDIME: SIGNIFICANT CHANGE UP
-  CEFTOLOZANE/TAZOBACTAM: SIGNIFICANT CHANGE UP
-  CEFTRIAXONE: SIGNIFICANT CHANGE UP
-  CEFUROXIME: SIGNIFICANT CHANGE UP
-  CIPROFLOXACIN: SIGNIFICANT CHANGE UP
-  CIPROFLOXACIN: SIGNIFICANT CHANGE UP
-  ERTAPENEM: SIGNIFICANT CHANGE UP
-  GENTAMICIN: SIGNIFICANT CHANGE UP
-  GENTAMICIN: SIGNIFICANT CHANGE UP
-  IMIPENEM: SIGNIFICANT CHANGE UP
-  IMIPENEM: SIGNIFICANT CHANGE UP
-  LEVOFLOXACIN: SIGNIFICANT CHANGE UP
-  LEVOFLOXACIN: SIGNIFICANT CHANGE UP
-  MEROPENEM: SIGNIFICANT CHANGE UP
-  MEROPENEM: SIGNIFICANT CHANGE UP
-  NITROFURANTOIN: SIGNIFICANT CHANGE UP
-  PIPERACILLIN/TAZOBACTAM: SIGNIFICANT CHANGE UP
-  PIPERACILLIN/TAZOBACTAM: SIGNIFICANT CHANGE UP
-  TOBRAMYCIN: SIGNIFICANT CHANGE UP
-  TOBRAMYCIN: SIGNIFICANT CHANGE UP
-  TRIMETHOPRIM/SULFAMETHOXAZOLE: SIGNIFICANT CHANGE UP
ANION GAP SERPL CALC-SCNC: 14 MMOL/L — SIGNIFICANT CHANGE UP (ref 5–17)
BLANDM BLD POS QL PROBE: SIGNIFICANT CHANGE UP
BUN SERPL-MCNC: 61 MG/DL — HIGH (ref 7–23)
CALCIUM SERPL-MCNC: 10.2 MG/DL — HIGH (ref 8.5–10.1)
CHLORIDE SERPL-SCNC: 94 MMOL/L — LOW (ref 96–108)
CO2 SERPL-SCNC: 23 MMOL/L — SIGNIFICANT CHANGE UP (ref 22–31)
CREAT SERPL-MCNC: 5.3 MG/DL — HIGH (ref 0.5–1.3)
CULTURE RESULTS: SIGNIFICANT CHANGE UP
EGFR: 8 ML/MIN/1.73M2 — LOW
GLUCOSE SERPL-MCNC: 294 MG/DL — HIGH (ref 70–99)
HCT VFR BLD CALC: 37.5 % — SIGNIFICANT CHANGE UP (ref 34.5–45)
HGB BLD-MCNC: 12.3 G/DL — SIGNIFICANT CHANGE UP (ref 11.5–15.5)
MCHC RBC-ENTMCNC: 31.5 PG — SIGNIFICANT CHANGE UP (ref 27–34)
MCHC RBC-ENTMCNC: 32.8 GM/DL — SIGNIFICANT CHANGE UP (ref 32–36)
MCV RBC AUTO: 96.2 FL — SIGNIFICANT CHANGE UP (ref 80–100)
METHOD TYPE: SIGNIFICANT CHANGE UP
NRBC # BLD: 0 /100 WBCS — SIGNIFICANT CHANGE UP (ref 0–0)
ORGANISM # SPEC MICROSCOPIC CNT: SIGNIFICANT CHANGE UP
PLATELET # BLD AUTO: 442 K/UL — HIGH (ref 150–400)
POTASSIUM SERPL-MCNC: 4.6 MMOL/L — SIGNIFICANT CHANGE UP (ref 3.5–5.3)
POTASSIUM SERPL-SCNC: 4.6 MMOL/L — SIGNIFICANT CHANGE UP (ref 3.5–5.3)
RBC # BLD: 3.9 M/UL — SIGNIFICANT CHANGE UP (ref 3.8–5.2)
RBC # FLD: 16.4 % — HIGH (ref 10.3–14.5)
SODIUM SERPL-SCNC: 131 MMOL/L — LOW (ref 135–145)
SPECIMEN SOURCE: SIGNIFICANT CHANGE UP
WBC # BLD: 15.89 K/UL — HIGH (ref 3.8–10.5)
WBC # FLD AUTO: 15.89 K/UL — HIGH (ref 3.8–10.5)

## 2023-07-07 PROCEDURE — 99233 SBSQ HOSP IP/OBS HIGH 50: CPT

## 2023-07-07 PROCEDURE — 71250 CT THORAX DX C-: CPT | Mod: 26

## 2023-07-07 PROCEDURE — 99221 1ST HOSP IP/OBS SF/LOW 40: CPT

## 2023-07-07 PROCEDURE — 74176 CT ABD & PELVIS W/O CONTRAST: CPT | Mod: 26

## 2023-07-07 RX ORDER — HUMAN INSULIN 100 [IU]/ML
6 INJECTION, SUSPENSION SUBCUTANEOUS ONCE
Refills: 0 | Status: DISCONTINUED | OUTPATIENT
Start: 2023-07-07 | End: 2023-07-07

## 2023-07-07 RX ORDER — INSULIN GLARGINE 100 [IU]/ML
6 INJECTION, SOLUTION SUBCUTANEOUS AT BEDTIME
Refills: 0 | Status: DISCONTINUED | OUTPATIENT
Start: 2023-07-07 | End: 2023-07-08

## 2023-07-07 RX ADMIN — CEFEPIME 100 MILLIGRAM(S): 1 INJECTION, POWDER, FOR SOLUTION INTRAMUSCULAR; INTRAVENOUS at 15:20

## 2023-07-07 RX ADMIN — APIXABAN 2.5 MILLIGRAM(S): 2.5 TABLET, FILM COATED ORAL at 06:00

## 2023-07-07 RX ADMIN — Medication 0.1 MILLIGRAM(S): at 06:00

## 2023-07-07 RX ADMIN — Medication 60 MILLIGRAM(S): at 15:19

## 2023-07-07 RX ADMIN — Medication 3: at 17:51

## 2023-07-07 RX ADMIN — INSULIN GLARGINE 6 UNIT(S): 100 INJECTION, SOLUTION SUBCUTANEOUS at 21:59

## 2023-07-07 RX ADMIN — Medication 2.5 MILLIGRAM(S): at 06:00

## 2023-07-07 RX ADMIN — Medication 0.1 MILLIGRAM(S): at 17:50

## 2023-07-07 RX ADMIN — PANTOPRAZOLE SODIUM 40 MILLIGRAM(S): 20 TABLET, DELAYED RELEASE ORAL at 06:01

## 2023-07-07 RX ADMIN — Medication 60 MILLIGRAM(S): at 06:00

## 2023-07-07 RX ADMIN — Medication 50 MILLIGRAM(S): at 11:57

## 2023-07-07 RX ADMIN — SENNA PLUS 2 TABLET(S): 8.6 TABLET ORAL at 21:44

## 2023-07-07 RX ADMIN — APIXABAN 2.5 MILLIGRAM(S): 2.5 TABLET, FILM COATED ORAL at 17:50

## 2023-07-07 RX ADMIN — Medication 81 MILLIGRAM(S): at 11:57

## 2023-07-07 RX ADMIN — POLYETHYLENE GLYCOL 3350 17 GRAM(S): 17 POWDER, FOR SOLUTION ORAL at 11:57

## 2023-07-07 RX ADMIN — Medication 100 MILLIGRAM(S): at 06:00

## 2023-07-07 RX ADMIN — Medication 1: at 21:58

## 2023-07-07 RX ADMIN — Medication 6: at 12:27

## 2023-07-07 RX ADMIN — Medication 100 MILLIGRAM(S): at 17:50

## 2023-07-07 RX ADMIN — Medication 60 MILLIGRAM(S): at 21:44

## 2023-07-07 RX ADMIN — LIDOCAINE 1 PATCH: 4 CREAM TOPICAL at 11:57

## 2023-07-07 RX ADMIN — MIRTAZAPINE 7.5 MILLIGRAM(S): 45 TABLET, ORALLY DISINTEGRATING ORAL at 21:44

## 2023-07-07 RX ADMIN — Medication 2.5 MILLIGRAM(S): at 17:50

## 2023-07-07 NOTE — PROGRESS NOTE ADULT - ASSESSMENT
72-year-old female with significant past medical history for hypertension, diabetes, dementia, end-stage renal disease on hemodialysis presents to the ED status post unwitnessed fall from [facility].  Patient states that she heard some voices then rolled out of bed.  Patient complaining of right hip pain.  Unknown head trauma.  + Eliquis < from: Xray Femur showed  Fractures including the medial wall of the acetabulum and inferior right pubic ramus Admitted for pain management ,PT/OT

## 2023-07-07 NOTE — CONSULT NOTE ADULT - SUBJECTIVE AND OBJECTIVE BOX
Patient is a 72y old  Female who presents with a chief complaint of s/p fall (07 Jul 2023 16:32)    pt a&Ox1, disoriented to time, place, situation, left message for [facility] rehab, obtained history from chart.   Type 2 DM, complications ESRD on HD. managed by Dr. Joseluis James, Rx home Basaglar 8 units @ HS. pt eating >50% of meals, refused insulin subq injection this AM according to RN. add Lantus 6 units @ HS, c/w COURTNEY    HPI:  72-year-old female with significant past medical history for hypertension, diabetes, dementia, end-stage renal disease on hemodialysis presents to the ED status post unwitnessed fall from [facility].  Patient states that she heard some voices then rolled out of bed.  Patient complaining of right hip pain.  Unknown head trauma.  + Eliquis < from: Xray Femur showed  Fractures including the medial wall of the acetabulum and inferior right pubic ramus Admitted for pain management ,PT/OT        (01 Jul 2023 15:24)      PAST MEDICAL & SURGICAL HISTORY:  HTN (hypertension)      h/o Anxiety attack      Depression      h/o Myocardial infarct 2007      h/o Hepatitis A 1969  currently resolved, no symptoms      Murmur, cardiac      h/o Smoking  quitted 3/2012      Anemia secondary to renal failure      ESRD on dialysis      Falls      Ataxia      Type 2 diabetes mellitus      Peripheral vascular disease, unspecified      CAD (coronary artery disease)      Diabetes      coronary stent 2007      s/p Ovarian cyst removal      s/p surgical removal of benign Skin lesion epigastric area      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 1000 milliLiter(s) (50 mL/Hr) IV Continuous <Continuous>  dextrose 5%. 1000 milliLiter(s) (100 mL/Hr) IV Continuous <Continuous>  dextrose 50% Injectable 12.5 Gram(s) IV Push once  dextrose 50% Injectable 25 Gram(s) IV Push once  dextrose 50% Injectable 25 Gram(s) IV Push once  diltiazem    Tablet 60 milliGRAM(s) Oral three times a day  dronabinol 2.5 milliGRAM(s) Oral two times a day before meals  glucagon  Injectable 1 milliGRAM(s) IntraMuscular once  imipramine 50 milliGRAM(s) Oral daily  insulin glargine Injectable (LANTUS) 6 Unit(s) SubCutaneous at bedtime  insulin lispro (ADMELOG) corrective regimen sliding scale   SubCutaneous three times a day before meals  insulin lispro (ADMELOG) corrective regimen sliding scale   SubCutaneous at bedtime  lidocaine   4% Patch 1 Patch Transdermal daily  metoprolol tartrate 100 milliGRAM(s) Oral two times a day  mirtazapine 7.5 milliGRAM(s) Oral at bedtime  pantoprazole    Tablet 40 milliGRAM(s) Oral before breakfast  polyethylene glycol 3350 17 Gram(s) Oral daily  senna 2 Tablet(s) Oral at bedtime

## 2023-07-07 NOTE — PROGRESS NOTE ADULT - SUBJECTIVE AND OBJECTIVE BOX
Date of Service: 07-07-23 @ 13:14    Patient is a 72y old  Female who presents with a chief complaint of s/p fall (07 Jul 2023 07:59)      INTERVAL HPI/OVERNIGHT EVENTS: Patient seen and examined. NAD. No complaints.    Vital Signs Last 24 Hrs  T(C): 37.2 (07 Jul 2023 11:51), Max: 37.4 (06 Jul 2023 20:51)  T(F): 98.9 (07 Jul 2023 11:51), Max: 99.3 (06 Jul 2023 20:51)  HR: 82 (07 Jul 2023 11:51) (82 - 96)  BP: 153/62 (07 Jul 2023 11:51) (136/67 - 170/79)  BP(mean): --  RR: 16 (07 Jul 2023 11:51) (16 - 18)  SpO2: 94% (07 Jul 2023 11:51) (94% - 96%)    Parameters below as of 07 Jul 2023 11:51  Patient On (Oxygen Delivery Method): room air        07-07    131<L>  |  94<L>  |  61<H>  ----------------------------<  294<H>  4.6   |  23  |  5.30<H>    Ca    10.2<H>      07 Jul 2023 07:27                            12.3   15.89 )-----------( 442      ( 07 Jul 2023 07:27 )             37.5       CAPILLARY BLOOD GLUCOSE      POCT Blood Glucose.: 440 mg/dL (07 Jul 2023 12:23)  POCT Blood Glucose.: 301 mg/dL (07 Jul 2023 08:22)  POCT Blood Glucose.: 313 mg/dL (06 Jul 2023 21:56)  POCT Blood Glucose.: 200 mg/dL (06 Jul 2023 17:06)    Urinalysis Basic - ( 07 Jul 2023 07:27 )    Color: x / Appearance: x / SG: x / pH: x  Gluc: 294 mg/dL / Ketone: x  / Bili: x / Urobili: x   Blood: x / Protein: x / Nitrite: x   Leuk Esterase: x / RBC: x / WBC x   Sq Epi: x / Non Sq Epi: x / Bacteria: x              acetaminophen     Tablet .. 650 milliGRAM(s) Oral every 6 hours PRN  apixaban 2.5 milliGRAM(s) Oral two times a day  aspirin enteric coated 81 milliGRAM(s) Oral daily  cefepime   IVPB      cefepime   IVPB 1000 milliGRAM(s) IV Intermittent every 24 hours  cloNIDine 0.1 milliGRAM(s) Oral two times a day  dextrose 5%. 1000 milliLiter(s) IV Continuous <Continuous>  dextrose 5%. 1000 milliLiter(s) IV Continuous <Continuous>  dextrose 50% Injectable 12.5 Gram(s) IV Push once  dextrose 50% Injectable 25 Gram(s) IV Push once  dextrose 50% Injectable 25 Gram(s) IV Push once  dextrose Oral Gel 15 Gram(s) Oral once PRN  diltiazem    Tablet 60 milliGRAM(s) Oral three times a day  dronabinol 2.5 milliGRAM(s) Oral two times a day before meals  glucagon  Injectable 1 milliGRAM(s) IntraMuscular once  imipramine 50 milliGRAM(s) Oral daily  insulin glargine Injectable (LANTUS) 6 Unit(s) SubCutaneous at bedtime  insulin lispro (ADMELOG) corrective regimen sliding scale   SubCutaneous three times a day before meals  insulin lispro (ADMELOG) corrective regimen sliding scale   SubCutaneous at bedtime  lidocaine   4% Patch 1 Patch Transdermal daily  metoprolol tartrate 100 milliGRAM(s) Oral two times a day  mirtazapine 7.5 milliGRAM(s) Oral at bedtime  morphine  - Injectable 2 milliGRAM(s) IV Push every 6 hours PRN  pantoprazole    Tablet 40 milliGRAM(s) Oral before breakfast  polyethylene glycol 3350 17 Gram(s) Oral daily  senna 2 Tablet(s) Oral at bedtime  traMADol 50 milliGRAM(s) Oral every 8 hours PRN  traMADol 25 milliGRAM(s) Oral every 6 hours PRN              REVIEW OF SYSTEMS:  CONSTITUTIONAL: No fever, no weight loss, or no fatigue  NECK: No pain, no stiffness  RESPIRATORY: No cough, no wheezing, no chills, no hemoptysis, No shortness of breath  CARDIOVASCULAR: No chest pain, no palpitations, no dizziness, no leg swelling  GASTROINTESTINAL: No abdominal pain. No nausea, no vomiting, no hematemesis; No diarrhea, no constipation. No melena, no hematochezia.  GENITOURINARY: No dysuria, no frequency, no hematuria, no incontinence  NEUROLOGICAL: No headaches, no loss of strength, no numbness, no tremors  SKIN: No itching, no burning  MUSCULOSKELETAL: No joint pain, no swelling; No muscle, no back, no extremity pain  PSYCHIATRIC: No depression, no mood swings,   HEME/LYMPH: No easy bruising, no bleeding gums  ALLERY AND IMMUNOLOGIC: No hives       Consultant(s) Notes Reviewed:  [X] YES  [ ] NO    PHYSICAL EXAM:  GENERAL: NAD  HEAD:  Atraumatic, Normocephalic  EYES: EOMI, PERRLA, conjunctiva and sclera clear  ENMT: No tonsillar erythema, exudates, or enlargement; Moist mucous membranes  NECK: Supple, No JVD  NERVOUS SYSTEM:  Awake & alert  CHEST/LUNG: Clear to auscultation bilaterally; No rales, rhonchi, wheezing,  HEART: Regular rate and rhythm  ABDOMEN: Soft, Nontender, Nondistended; Bowel sounds present  EXTREMITIES:  No clubbing, cyanosis, or edema  LYMPH: No lymphadenopathy noted  SKIN: No rashes      Advanced care planning discussed with patient/family [X] YES   [ ] NO    Advanced care planning discussed with patient/family. Patient's health status was discussed. All appropriate changes have been made regarding patient's end-of-life care. Advanced care planning forms reviewed/discussed/completed.  20 minutes spent.

## 2023-07-07 NOTE — CONSULT NOTE ADULT - ASSESSMENT
Physical Exam:   Vital Signs Last 24 Hrs  T(C): 37.2 (07 Jul 2023 11:51), Max: 37.4 (06 Jul 2023 20:51)  T(F): 98.9 (07 Jul 2023 11:51), Max: 99.3 (06 Jul 2023 20:51)  HR: 82 (07 Jul 2023 11:51) (82 - 96)  BP: 153/62 (07 Jul 2023 11:51) (136/67 - 170/79)  BP(mean): --  RR: 16 (07 Jul 2023 11:51) (16 - 18)  SpO2: 94% (07 Jul 2023 11:51) (94% - 96%)    Parameters below as of 07 Jul 2023 11:51  Patient On (Oxygen Delivery Method): room air         CAPILLARY BLOOD GLUCOSE      POCT Blood Glucose.: 440 mg/dL (07 Jul 2023 12:23)  POCT Blood Glucose.: 301 mg/dL (07 Jul 2023 08:22)  POCT Blood Glucose.: 313 mg/dL (06 Jul 2023 21:56)      Cholesterol, Serum: 113 mg/dL (05.19.21 @ 08:36)     HDL Cholesterol, Serum: 22 mg/dL (05.19.21 @ 08:36)     LDL Cholesterol Calculated: 66 mg/dL (05.19.21 @ 08:36)     DIET: CC  >50%

## 2023-07-07 NOTE — PROGRESS NOTE ADULT - SUBJECTIVE AND OBJECTIVE BOX
Patient is a 72y Female whom presented to the hospital with esrd on hd     PAST MEDICAL & SURGICAL HISTORY:  HTN (hypertension)      h/o Anxiety attack      Depression      h/o Myocardial infarct 2007      h/o Hepatitis A 1969  currently resolved, no symptoms      Murmur, cardiac      h/o Smoking  quitted 3/2012      Anemia secondary to renal failure      ESRD on dialysis      Falls      Ataxia      Type 2 diabetes mellitus      Peripheral vascular disease, unspecified      CAD (coronary artery disease)      Diabetes      coronary stent 2007      s/p Ovarian cyst removal      s/p surgical removal of benign Skin lesion epigastric area      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 650 milliGRAM(s) Oral every 6 hours  aspirin enteric coated 81 milliGRAM(s) Oral daily  cloNIDine 0.1 milliGRAM(s) Oral two times a day  dextrose 5%. 1000 milliLiter(s) (50 mL/Hr) IV Continuous <Continuous>  dextrose 5%. 1000 milliLiter(s) (100 mL/Hr) IV Continuous <Continuous>  dextrose 50% Injectable 25 Gram(s) IV Push once  dextrose 50% Injectable 12.5 Gram(s) IV Push once  dextrose 50% Injectable 25 Gram(s) IV Push once  diltiazem    Tablet 60 milliGRAM(s) Oral three times a day  dronabinol 2.5 milliGRAM(s) Oral two times a day before meals  glucagon  Injectable 1 milliGRAM(s) IntraMuscular once  imipramine 50 milliGRAM(s) Oral daily  insulin lispro (ADMELOG) corrective regimen sliding scale   SubCutaneous three times a day before meals  metoprolol tartrate 100 milliGRAM(s) Oral two times a day  mirtazapine 7.5 milliGRAM(s) Oral at bedtime  multivitamin 1 Tablet(s) Oral daily  pantoprazole    Tablet 40 milliGRAM(s) Oral before breakfast  senna 2 Tablet(s) Oral at bedtime  sodium chloride 0.45%. 1000 milliLiter(s) (50 mL/Hr) IV Continuous <Continuous>      Allergies    No Known Drug Allergies  latex (Hives)    Intolerances                          12.3   15.89 )-----------( 442      ( 07 Jul 2023 07:27 )             37.5       CBC Full  -  ( 07 Jul 2023 07:27 )  WBC Count : 15.89 K/uL  RBC Count : 3.90 M/uL  Hemoglobin : 12.3 g/dL  Hematocrit : 37.5 %  Platelet Count - Automated : 442 K/uL  Mean Cell Volume : 96.2 fl  Mean Cell Hemoglobin : 31.5 pg  Mean Cell Hemoglobin Concentration : 32.8 gm/dL  Auto Neutrophil # : x  Auto Lymphocyte # : x  Auto Monocyte # : x  Auto Eosinophil # : x  Auto Basophil # : x  Auto Neutrophil % : x  Auto Lymphocyte % : x  Auto Monocyte % : x  Auto Eosinophil % : x  Auto Basophil % : x      07-07    131<L>  |  94<L>  |  61<H>  ----------------------------<  294<H>  4.6   |  23  |  5.30<H>    Ca    10.2<H>      07 Jul 2023 07:27        CAPILLARY BLOOD GLUCOSE      POCT Blood Glucose.: 440 mg/dL (07 Jul 2023 12:23)  POCT Blood Glucose.: 301 mg/dL (07 Jul 2023 08:22)  POCT Blood Glucose.: 313 mg/dL (06 Jul 2023 21:56)  POCT Blood Glucose.: 200 mg/dL (06 Jul 2023 17:06)      Vital Signs Last 24 Hrs  T(C): 37.2 (07 Jul 2023 11:51), Max: 37.4 (06 Jul 2023 20:51)  T(F): 98.9 (07 Jul 2023 11:51), Max: 99.3 (06 Jul 2023 20:51)  HR: 82 (07 Jul 2023 11:51) (82 - 96)  BP: 153/62 (07 Jul 2023 11:51) (136/67 - 170/79)  BP(mean): --  RR: 16 (07 Jul 2023 11:51) (16 - 18)  SpO2: 94% (07 Jul 2023 11:51) (94% - 96%)    Parameters below as of 07 Jul 2023 11:51  Patient On (Oxygen Delivery Method): room air        Urinalysis Basic - ( 07 Jul 2023 07:27 )    Color: x / Appearance: x / SG: x / pH: x  Gluc: 294 mg/dL / Ketone: x  / Bili: x / Urobili: x   Blood: x / Protein: x / Nitrite: x   Leuk Esterase: x / RBC: x / WBC x   Sq Epi: x / Non Sq Epi: x / Bacteria: x            SOCIAL HISTORY:  Denies ETOh,Smoking,     FAMILY HISTORY:  FH: myocardial infarction (Father)    FH: myocardial infarction (Father)    Family history of type 2 diabetes mellitus in brother (Sibling)        REVIEW OF SYSTEMS:    CONSTITUTIONAL: No weakness, fevers or chills  RESPIRATORY: No cough, wheezing, hemoptysis; No shortness of breath  CARDIOVASCULAR: No chest pain or palpitations  GASTROINTESTINAL: No abdominal or epigastric pain. No nausea, vomiting,                                      PHYSICAL EXAM:    Constitutional: NAD  HEENT: conjunctive   clear   Neck:  No JVD  Respiratory: CTAB  Cardiovascular: S1 and S2  Gastrointestinal: BS+, soft, NT/ND  Extremities: No peripheral edema  Neurological:  no focal deficits  pos fistula

## 2023-07-07 NOTE — PROGRESS NOTE ADULT - ASSESSMENT
. 7/1/2023 72-year-old female former smoker quit 3/2012  with significant past medical history for hypertension, CAD sp cabg  PVD  diabetes, dementia, end-stage renal disease on hemodialysis   a fib on eliquis sp recent hosp stay 5/17-5/24/2023  admitted 3/7-3/11/2023 and before that 2/22-2/25/2023   . During 5/2023 admission she had   . ENTERORHINOVIRUS INFECTION RESP TRACT 5/17  . PLEURAL EFFUSION SP l THORA 5/18 TRANSUDATE     . Patient now  presented to the ED 7/1/2023 status post unwitnessed fall from [facility].  Patient states that she heard some voices then rolled out of bed.  Patient complaining of right hip pain.  Unknown head trauma.  + Eliquis     She was found to have acetabular fracture which Ortho was contacted and they plan non operative management Pt was admitted to [facility] for bleed as she was on apixaban and was noted to have pl effsn    . 7/1/2023  I was asked to admit pt                      (01 Jul 2023 13:32)      esrd on hd tues thurs sat     ANEMIA PLAN:  Anemia of chronic disease:  Well controlled by Aranesp  H and H subtherapeutic .  We will continue Aranesp aiming for a HCT of 32-36 %.   We will monitor Iron stores, B12 and RBC folate .      BP monitoring,continue current antihypertensive meds, low salt diet,followup with PMD in 1-2 weeks      Accuchecks monitoring and insulin sliding scale coverage, no concentrated sweets diet, serial labs and f/up with PMD, monitor HB A 1 C every 3-4 mnth

## 2023-07-07 NOTE — CONSULT NOTE ADULT - CONSULT REASON
72y A1C with Estimated Average Glucose Result: 7.0 % (07-03-23 @ 09:00)  A1C with Estimated Average Glucose Result: 7.1 % (05-18-23 @ 06:30)   diabetes mellitus uncontrolled type 2

## 2023-07-07 NOTE — PROGRESS NOTE ADULT - SUBJECTIVE AND OBJECTIVE BOX
Patient is a 72y Female with a known history of :  Hip fracture [S72.009A]    Closed pelvic fracture [S32.9XXA]    Pubic ramus fracture [S32.599A]    Right acetabular fracture [S32.401A]    ESRD on dialysis [N18.6]    Prophylactic measure [Z29.9]    HTN (hypertension) [I10]    Fall [W19.XXXA]    Pleural effusion [J90]    Acute febrile illness [R50.9]      HPI:  72-year-old female with significant past medical history for hypertension, diabetes, dementia, end-stage renal disease on hemodialysis presents to the ED status post unwitnessed fall from [facility].  Patient states that she heard some voices then rolled out of bed.  Patient complaining of right hip pain.  Unknown head trauma.  + Eliquis < from: Xray Femur showed  Fractures including the medial wall of the acetabulum and inferior right pubic ramus Admitted for pain management ,PT/OT        (01 Jul 2023 15:24)      REVIEW OF SYSTEMS:    CONSTITUTIONAL: No fever, weight loss, or fatigue  EYES: No eye pain, visual disturbances, or discharge  ENMT:  No difficulty hearing, tinnitus, vertigo; No sinus or throat pain  NECK: No pain or stiffness  BREASTS: No pain, masses, or nipple discharge  RESPIRATORY: No cough, wheezing, chills or hemoptysis; No shortness of breath  CARDIOVASCULAR: No chest pain, palpitations, dizziness, or leg swelling  GASTROINTESTINAL: No abdominal or epigastric pain. No nausea, vomiting, or hematemesis; No diarrhea or constipation. No melena or hematochezia.  GENITOURINARY: No dysuria, frequency, hematuria, or incontinence  NEUROLOGICAL: No headaches, memory loss, loss of strength, numbness, or tremors  SKIN: No itching, burning, rashes, or lesions   LYMPH NODES: No enlarged glands  ENDOCRINE: No heat or cold intolerance; No hair loss  MUSCULOSKELETAL: No joint pain or swelling; No muscle, back, or extremity pain  PSYCHIATRIC: No depression, anxiety, mood swings, or difficulty sleeping  HEME/LYMPH: No easy bruising, or bleeding gums  ALLERGY AND IMMUNOLOGIC: No hives or eczema    MEDICATIONS  (STANDING):  apixaban 2.5 milliGRAM(s) Oral two times a day  aspirin enteric coated 81 milliGRAM(s) Oral daily  cefepime   IVPB      cefepime   IVPB 1000 milliGRAM(s) IV Intermittent every 24 hours  cloNIDine 0.1 milliGRAM(s) Oral two times a day  dextrose 5%. 1000 milliLiter(s) (50 mL/Hr) IV Continuous <Continuous>  dextrose 5%. 1000 milliLiter(s) (100 mL/Hr) IV Continuous <Continuous>  dextrose 50% Injectable 12.5 Gram(s) IV Push once  dextrose 50% Injectable 25 Gram(s) IV Push once  dextrose 50% Injectable 25 Gram(s) IV Push once  diltiazem    Tablet 60 milliGRAM(s) Oral three times a day  dronabinol 2.5 milliGRAM(s) Oral two times a day before meals  glucagon  Injectable 1 milliGRAM(s) IntraMuscular once  imipramine 50 milliGRAM(s) Oral daily  insulin lispro (ADMELOG) corrective regimen sliding scale   SubCutaneous three times a day before meals  insulin lispro (ADMELOG) corrective regimen sliding scale   SubCutaneous at bedtime  lidocaine   4% Patch 1 Patch Transdermal daily  metoprolol tartrate 100 milliGRAM(s) Oral two times a day  mirtazapine 7.5 milliGRAM(s) Oral at bedtime  pantoprazole    Tablet 40 milliGRAM(s) Oral before breakfast  polyethylene glycol 3350 17 Gram(s) Oral daily  senna 2 Tablet(s) Oral at bedtime    MEDICATIONS  (PRN):  acetaminophen     Tablet .. 650 milliGRAM(s) Oral every 6 hours PRN Temp greater or equal to 38C (100.4F), Mild Pain (1 - 3)  dextrose Oral Gel 15 Gram(s) Oral once PRN Blood Glucose LESS THAN 70 milliGRAM(s)/deciliter  morphine  - Injectable 2 milliGRAM(s) IV Push every 6 hours PRN Severe Pain (7 - 10)  traMADol 25 milliGRAM(s) Oral every 6 hours PRN Moderate Pain (4 - 6)  traMADol 50 milliGRAM(s) Oral every 8 hours PRN Severe Pain (7 - 10)      ALLERGIES: No Known Drug Allergies  latex (Hives)      FAMILY HISTORY:  FH: myocardial infarction (Father)    FH: myocardial infarction (Father)    Family history of type 2 diabetes mellitus in brother (Sibling)        PHYSICAL EXAMINATION:  -----------------------------  T(C): 37.3 (07-07-23 @ 04:54), Max: 37.4 (07-06-23 @ 20:51)  HR: 96 (07-07-23 @ 04:54) (71 - 96)  BP: 136/67 (07-07-23 @ 04:54) (136/67 - 178/-)  RR: 16 (07-07-23 @ 04:54) (16 - 18)  SpO2: 96% (07-07-23 @ 04:54) (95% - 98%)  Wt(kg): --        VITALS  T(C): 37.3 (07-07-23 @ 04:54), Max: 37.4 (07-06-23 @ 20:51)  HR: 96 (07-07-23 @ 04:54) (71 - 96)  BP: 136/67 (07-07-23 @ 04:54) (136/67 - 178/-)  RR: 16 (07-07-23 @ 04:54) (16 - 18)  SpO2: 96% (07-07-23 @ 04:54) (95% - 98%)    Constitutional: well developed, normal appearance, well groomed, well nourished, no deformities and no acute distress.   Eyes: the conjunctiva exhibited no abnormalities and the eyelids demonstrated no xanthelasmas.   HEENT: normal oral mucosa, no oral pallor and no oral cyanosis.   Neck: normal jugular venous A waves present, normal jugular venous V waves present and no jugular venous wilson A waves.   Pulmonary: no respiratory distress, normal respiratory rhythm and effort, no accessory muscle use and lungs were clear to auscultation bilaterally.   Cardiovascular: heart rate and rhythm were normal, normal S1 and S2 and no murmur, gallop, rub, heave or thrill are present.   Abdomen: soft, non-tender, no hepato-splenomegaly and no abdominal mass palpated.   Musculoskeletal: the gait could not be assessed..   Extremities: no clubbing of the fingernails, no localized cyanosis, no petechial hemorrhages and no ischemic changes.   Skin: normal skin color and pigmentation, no rash, no venous stasis, no skin lesions, no skin ulcer and no xanthoma was observed.   Psychiatric: oriented to person, place, and time, the affect was normal, the mood was normal and not feeling anxious.     LABS:   --------  07-06    131<L>  |  94<L>  |  88<H>  ----------------------------<  279<H>  5.2   |  24  |  6.70<H>    Ca    9.7      06 Jul 2023 07:50                           11.1   13.16 )-----------( 364      ( 06 Jul 2023 07:50 )             34.6     PT/INR - ( 05 Jul 2023 08:28 )   PT: 16.7 sec;   INR: 1.42 ratio                     RADIOLOGY:  -----------------    ECG:     ECHO:

## 2023-07-07 NOTE — PROGRESS NOTE ADULT - SUBJECTIVE AND OBJECTIVE BOX
CHIEF COMPLAINT/ REASON FOR VISIT  .. Patient was seen to address the  issue listed under PROBLEM LIST which is located toward bottom of this note     SHILO KOHLER    PLSHARON 3WES 366 D1    Allergies    No Known Drug Allergies  latex (Hives)    Intolerances        PAST MEDICAL & SURGICAL HISTORY:  HTN (hypertension)      h/o Anxiety attack      Depression      h/o Myocardial infarct 2007      h/o Hepatitis A 1969  currently resolved, no symptoms      Murmur, cardiac      h/o Smoking  quitted 3/2012      Anemia secondary to renal failure      ESRD on dialysis      Falls      Ataxia      Type 2 diabetes mellitus      Peripheral vascular disease, unspecified      CAD (coronary artery disease)      Diabetes      coronary stent 2007      s/p Ovarian cyst removal      s/p surgical removal of benign Skin lesion epigastric area      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          FAMILY HISTORY:  FH: myocardial infarction (Father)    FH: myocardial infarction (Father)    Family history of type 2 diabetes mellitus in brother (Sibling)        Home Medications:  acetaminophen 325 mg oral tablet: 2 tab(s) orally every 6 hours as needed (02 Jul 2023 15:24)  Admelog SoloStar 100 units/mL injectable solution: injectable 3 times a day (before meals)sliding scale  (02 Jul 2023 15:24)  apixaban 2.5 mg oral tablet: 1 tab(s) orally 2 times a day (02 Jul 2023 15:24)  aspirin 81 mg oral delayed release tablet: 1 tab(s) orally once a day (at bedtime) (02 Jul 2023 15:24)  atorvastatin 20 mg oral tablet: 1 tab(s) orally once a day (at bedtime) (02 Jul 2023 15:24)  bacitracin 500 units/g topical ointment: Apply topically to affected area once a day to right knee abrasion (02 Jul 2023 11:54)  Basaglar KwikPen 100 units/mL subcutaneous solution: 8 international unit(s) subcutaneous once a day (at bedtime) (02 Jul 2023 15:24)  cloNIDine 0.1 mg oral tablet: 1 tab(s) orally 2 times a day (02 Jul 2023 15:24)  DilTIAZem (Eqv-Cardizem CD) 180 mg/24 hours oral capsule, extended release: 1 cap(s) orally once a day (02 Jul 2023 15:24)  imipramine 50 mg oral tablet: 1 tab(s) orally once a day (in the evening) (02 Jul 2023 15:24)  metoprolol tartrate 100 mg oral tablet: 1 tab(s) orally 2 times a day (02 Jul 2023 15:24)  mirtazapine 7.5 mg oral tablet: 1 tab(s) orally once a day (at bedtime) (02 Jul 2023 15:24)  Nephro-Alfie oral tablet: 1 tab(s) orally once a day (02 Jul 2023 15:24)  PANTOPRAZOLE 40MG DR TAB: 1 tab(s) orally once a day (02 Jul 2023 15:24)  senna leaf extract oral tablet: 2 tab(s) orally once a day (at bedtime) (02 Jul 2023 15:24)      MEDICATIONS  (STANDING):  apixaban 2.5 milliGRAM(s) Oral two times a day  aspirin enteric coated 81 milliGRAM(s) Oral daily  cefepime   IVPB      cefepime   IVPB 1000 milliGRAM(s) IV Intermittent every 24 hours  cloNIDine 0.1 milliGRAM(s) Oral two times a day  dextrose 5%. 1000 milliLiter(s) (50 mL/Hr) IV Continuous <Continuous>  dextrose 5%. 1000 milliLiter(s) (100 mL/Hr) IV Continuous <Continuous>  dextrose 50% Injectable 12.5 Gram(s) IV Push once  dextrose 50% Injectable 25 Gram(s) IV Push once  dextrose 50% Injectable 25 Gram(s) IV Push once  diltiazem    Tablet 60 milliGRAM(s) Oral three times a day  dronabinol 2.5 milliGRAM(s) Oral two times a day before meals  glucagon  Injectable 1 milliGRAM(s) IntraMuscular once  imipramine 50 milliGRAM(s) Oral daily  insulin lispro (ADMELOG) corrective regimen sliding scale   SubCutaneous three times a day before meals  insulin lispro (ADMELOG) corrective regimen sliding scale   SubCutaneous at bedtime  lidocaine   4% Patch 1 Patch Transdermal daily  metoprolol tartrate 100 milliGRAM(s) Oral two times a day  mirtazapine 7.5 milliGRAM(s) Oral at bedtime  pantoprazole    Tablet 40 milliGRAM(s) Oral before breakfast  polyethylene glycol 3350 17 Gram(s) Oral daily  senna 2 Tablet(s) Oral at bedtime    MEDICATIONS  (PRN):  acetaminophen     Tablet .. 650 milliGRAM(s) Oral every 6 hours PRN Temp greater or equal to 38C (100.4F), Mild Pain (1 - 3)  dextrose Oral Gel 15 Gram(s) Oral once PRN Blood Glucose LESS THAN 70 milliGRAM(s)/deciliter  morphine  - Injectable 2 milliGRAM(s) IV Push every 6 hours PRN Severe Pain (7 - 10)  traMADol 25 milliGRAM(s) Oral every 6 hours PRN Moderate Pain (4 - 6)  traMADol 50 milliGRAM(s) Oral every 8 hours PRN Severe Pain (7 - 10)              Vital Signs Last 24 Hrs  T(C): 37.3 (07 Jul 2023 04:54), Max: 37.4 (06 Jul 2023 20:51)  T(F): 99.1 (07 Jul 2023 04:54), Max: 99.3 (06 Jul 2023 20:51)  HR: 96 (07 Jul 2023 04:54) (71 - 96)  BP: 136/67 (07 Jul 2023 04:54) (136/67 - 178/-)  BP(mean): --  RR: 16 (07 Jul 2023 04:54) (16 - 18)  SpO2: 96% (07 Jul 2023 04:54) (95% - 98%)    Parameters below as of 07 Jul 2023 04:54  Patient On (Oxygen Delivery Method): room air                  LABS:                        11.1   13.16 )-----------( 364      ( 06 Jul 2023 07:50 )             34.6     07-06    131<L>  |  94<L>  |  88<H>  ----------------------------<  279<H>  5.2   |  24  |  6.70<H>    Ca    9.7      06 Jul 2023 07:50      PT/INR - ( 05 Jul 2023 08:28 )   PT: 16.7 sec;   INR: 1.42 ratio           Urinalysis Basic - ( 06 Jul 2023 07:50 )    Color: x / Appearance: x / SG: x / pH: x  Gluc: 279 mg/dL / Ketone: x  / Bili: x / Urobili: x   Blood: x / Protein: x / Nitrite: x   Leuk Esterase: x / RBC: x / WBC x   Sq Epi: x / Non Sq Epi: x / Bacteria: x            WBC:  WBC Count: 13.16 K/uL (07-06 @ 07:50)  WBC Count: 18.89 K/uL (07-05 @ 08:28)  WBC Count: 13.94 K/uL (07-03 @ 07:30)      MICROBIOLOGY:  RECENT CULTURES:  07-03 Clean Catch Clean Catch (Midstream) XXXX XXXX   >100,000 CFU/ml Pseudomonas aeruginosa  50,000 - 99,000 CFU/mL Klebsiella pneumoniae    07-03 .Blood Blood XXXX XXXX   No growth at 72 Hours    07-03 .Blood Blood XXXX XXXX   No growth at 72 Hours                PT/INR - ( 05 Jul 2023 08:28 )   PT: 16.7 sec;   INR: 1.42 ratio             Sodium:  Sodium: 131 mmol/L (07-06 @ 07:50)  Sodium: 129 mmol/L (07-05 @ 08:28)  Sodium: 133 mmol/L (07-03 @ 07:30)      6.70 mg/dL 07-06 @ 07:50  5.20 mg/dL 07-05 @ 08:28  5.80 mg/dL 07-03 @ 07:30      Hemoglobin:  Hemoglobin: 11.1 g/dL (07-06 @ 07:50)  Hemoglobin: 11.2 g/dL (07-05 @ 08:28)  Hemoglobin: 11.2 g/dL (07-03 @ 07:30)      Platelets: Platelet Count - Automated: 364 K/uL (07-06 @ 07:50)  Platelet Count - Automated: 326 K/uL (07-05 @ 08:28)  Platelet Count - Automated: 274 K/uL (07-03 @ 07:30)          Urinalysis Basic - ( 06 Jul 2023 07:50 )    Color: x / Appearance: x / SG: x / pH: x  Gluc: 279 mg/dL / Ketone: x  / Bili: x / Urobili: x   Blood: x / Protein: x / Nitrite: x   Leuk Esterase: x / RBC: x / WBC x   Sq Epi: x / Non Sq Epi: x / Bacteria: x        RADIOLOGY & ADDITIONAL STUDIES:      MICROBIOLOGY:  RECENT CULTURES:  07-03 Clean Catch Clean Catch (Midstream) XXXX XXXX   >100,000 CFU/ml Pseudomonas aeruginosa  50,000 - 99,000 CFU/mL Klebsiella pneumoniae    07-03 .Blood Blood XXXX XXXX   No growth at 72 Hours    07-03 .Blood Blood XXXX XXXX   No growth at 72 Hours

## 2023-07-07 NOTE — PROGRESS NOTE ADULT - ASSESSMENT
REASON FOR VISIT  .. Management of problems listed below      NOTEWORTHY FINDINGS.     PHYSICAL EXAM    HEENT Unremarkable  atraumatic   RESP Fair air entry  Harsh breath sound   CARDIAC S1 S2 No S3     NO JVD    ABDOMEN No hepatosplenomegaly   PEDAL EDEMA present No calf tenderness  NO rash       GENERAL DATA .     GOC.      ICU STAY.   .. none  COVID.   ..      BEST PRACTICE ISSUES.    HOB ELEVATN.   .. Yes  DVT PPLX.   .. 7/3/2023 apixa 2.5 x2 restarted as IR feels we should tap fluid only if she develops shortness of breath  ..    7/2/2023 Apixaba 2.5 x2 held for thorac   STRESS ULCER PPLX.   ..      INFECTION PPLX.   ..    SP SW AMINAH.    ..        DIET.    ..  7/1/2023 renal restrns   IV fl.  ..      PROCEDURES.  ..   PAST PROCEDURES.    ..     GENERAL DATA .     ALLGY.  ..     latex                     WT.  ..  7/1/2023 54  BMI.  ..    7/1/2023 17     ABGS.    VS/ PO/IO/ VENT/ DRIPS.   7/7/2023 afeb 96 130/60   7/7/2023 ra 95%     CC/DOA.        . 7/1/2023 Pt sent from [facility] for unwitnessed fall out of bed.       .  PRESENTATION.   . 7/1/2023 72-year-old female former smoker quit 3/2012  with PMH for hypertension, CAD sp cabg  PVD   sp R-> L bifem - fem BK pop bypass  Fem pop bypass 6/21/2022  Partial ray amputation r great toe 6/22/2022  diabetes, dementia, ESRD on hemodialysis   a fib on eliquis sp recent hosp stay 5/17-5/24/2023     . ENTERORHINOVIRUS INFECTION RESP TRACT 5/17  . PLEURAL EFFUSION SP l THORA 5/18 TRANSUDATE     COURSE   . ACETABULAR Fx Ortho recommended non surgical mgmt  . PL EFFSN Was decided to hold off thora unless she develops sob    PROBLEMS/ASSESSMENT/RECOMMENDATIONS (A/R).  PULMONARY.  . GAS EXCHANGE.  .. A/R.   .. Monitor pulse ox and target po 90-95%    . PLEURAL EFFSN.  .. RELEVANT PAST HISTORU  .. 5/18/2023 l thorac 1.5 l   .. 5/18 pl fl l 13 m 73 p 5 afb sm (-) g 131 l 102/268 (.38) ph 7.6 pr 2.6/6.4 (.4)  .. Fluid transudate  .. WORKUP   .. CXR 7/1/2023 cxr Mod large l pl effs or lower zone airspace consolidation/atelecatsis   .. CT ch 7/1/2023 Ct ch   .... Decreased mod l effs since 5/17/2023   .. EVENTS  .. 7/2/2023 DW pt and dw son consensus is that they want fluid remived   .. 7/3/2023 dw ir plan changed plan is to tap if she becomes symptimatic   .. A/R  .. 7/3/2023 Thora cancelled as pt is comfortable will pan thora if she becomes symptomatic     ID.  .. WORKUP.  .. W 7/1-7/2-7/3-7/6-7/7/2023 W 15 - 14 - 13- 13 - 15  .. A/R   .. Leukocytosis likely sec to stress of hip fx  .. 7/1/2023 Checlk blood and urine cs     CARDIO.  . CAD.  .. 7/1/2023 ASA 81   .. A/R  .. cont rx    . A fib.  .. 7/1/2023 CARDIZEM 60.3   .. 7/3/2023 apixaba 2.5 x2   .. AR  .. Cont rx    HEMAT.  .. WORKUP.  .. Hb 7/1-7/2-7/3-7/6/2023 Hb 13 - 11.8 - 11.2 - 11  .. INR 7/1/2023    .. A/R  .. As pt was on apixaba will monitorserially     RENAL.  . ESRD.  .. WORKUP  .. NA 7/1/2023    .. K 7/1/2023 K 5.1   .. CR 7/1/2023 CR 5    ORTHO.  . FALL 7/1/2023.  .. CT head C sp 7/1/2023 CT HC (-)     . FRACTURE ACETABULUM r INFERIOR PUBIC RAMUS r 7/1/2023   .. IMAGING.  .. XR Pelvis and r hip 7/1/2023 XR Fractures r acetabulum and inf r pubic ramus   .. 7/1/2023 ct pelvix   .... comminuted r acetabular fx involving r sup and inf pubic rami medial wallacetabulum sup acetabulum and post wall acetabulum   .... small hematoma r pelvic sidewall   .. ORTHO.  .. 7/1/2023 DW Dr Dias She spoke to Ortho Dr Jenkins group and plan is for nonsurgical management  .. A/R  .. Monitor serial Hb ro bleed     . MAIN ISSUES  .. FX acetabulum on conservative rx  . A fib on doac  . ESRD   . Pl effs for thora when sob    TIME SPENT   . About 36 minutes aggregate care time spent on encounter; activities included   direct patient care, counseling and/or coordinating care reviewing notes, lab data/ imaging , discussion with multidisciplinary team/ patient  /family and explaining in detail risks, benefits, alternatives  of the recommendations     JOSE F LAO 72 f7/1/2023 1948 DR HARRY ALBRIGHT

## 2023-07-07 NOTE — PROGRESS NOTE ADULT - ASSESSMENT
73YO F PMH hypertension, diabetes, dementia, end-stage renal disease on hemodialysis, who presented status post unwitnessed fall from [facility]- rolled out of bed. Patient complained of right hip pain--found to have fractures including the medial wall of the acetabulum and inferior right pubic ramus.    Patient found to have leukocytosis, likely reactive 2/2 hip fracture, although started on empiric antibiotics on 7/4 given new fever. WBC increased to 15 today, but otherwise remains afebrile. Blood remain currently no growth. Urine culture growing pseudomonas and klebsiella. CT chest and A/P showed known L pleural effusion but no other evidence of infection.    #Leukocytosis  #Possible UTI    -continue cefepime 1g IV q24h pending pseudomonas sensitivities--can complete 5-7 day course of antibiotics  -follow cultures to completion  -aspiration precautions  -wound care  -monitor WBC  -discussed with Dr. Toussaint  -I will be covered by Dr. Long this weekend, 7/8-7/9/23    Lupe Tsai MD  Division of infectious Diseases  Cell [phone] between 8am and 6pm  After 6pm and over the weekends please call ID service line at [phone].    73YO F PMH hypertension, diabetes, dementia, end-stage renal disease on hemodialysis, who presented status post unwitnessed fall from [facility]- rolled out of bed. Patient complained of right hip pain--found to have fractures including the medial wall of the acetabulum and inferior right pubic ramus.    Patient found to have leukocytosis, likely reactive 2/2 hip fracture, although started on empiric antibiotics on 7/4 given new fever. WBC increased to 15 today, but otherwise remains afebrile. Blood remain currently no growth. Urine culture growing pseudomonas and klebsiella--although concern that these represent colonization in this instance in the absence of pyuria and in patient with ESRD. CT chest and A/P showed known L pleural effusion but no other evidence of infection.    #Leukocytosis  #Positive urine culture    -continue cefepime 1g IV q24h  -follow cultures to completion  -aspiration precautions  -wound care  -monitor WBC  -discussed with Dr. Toussaint  -I will be covered by Dr. Long this weekend, 7/8-7/9/23    Lupe Tsai MD  Division of infectious Diseases  Cell [phone] between 8am and 6pm  After 6pm and over the weekends please call ID service line at [phone].

## 2023-07-07 NOTE — PROGRESS NOTE ADULT - SUBJECTIVE AND OBJECTIVE BOX
[facility]  INFECTIOUS DISEASES - [address], [place] [address]  Tel: [phone]     Fax: [phone]  =======================================================    SHILO KOHLER [id number]    Follow up: No fevers. Denies any pain,, but unable to provide any further history.    Allergies:  No Known Drug Allergies  latex (Hives)      Antibiotics:  acetaminophen     Tablet .. 650 milliGRAM(s) Oral every 6 hours PRN  apixaban 2.5 milliGRAM(s) Oral two times a day  aspirin enteric coated 81 milliGRAM(s) Oral daily  cefepime   IVPB      cefepime   IVPB 1000 milliGRAM(s) IV Intermittent every 24 hours  cloNIDine 0.1 milliGRAM(s) Oral two times a day  dextrose 5%. 1000 milliLiter(s) IV Continuous <Continuous>  dextrose 5%. 1000 milliLiter(s) IV Continuous <Continuous>  dextrose 50% Injectable 12.5 Gram(s) IV Push once  dextrose 50% Injectable 25 Gram(s) IV Push once  dextrose 50% Injectable 25 Gram(s) IV Push once  dextrose Oral Gel 15 Gram(s) Oral once PRN  diltiazem    Tablet 60 milliGRAM(s) Oral three times a day  dronabinol 2.5 milliGRAM(s) Oral two times a day before meals  glucagon  Injectable 1 milliGRAM(s) IntraMuscular once  imipramine 50 milliGRAM(s) Oral daily  insulin glargine Injectable (LANTUS) 6 Unit(s) SubCutaneous at bedtime  insulin lispro (ADMELOG) corrective regimen sliding scale   SubCutaneous three times a day before meals  insulin lispro (ADMELOG) corrective regimen sliding scale   SubCutaneous at bedtime  lidocaine   4% Patch 1 Patch Transdermal daily  metoprolol tartrate 100 milliGRAM(s) Oral two times a day  mirtazapine 7.5 milliGRAM(s) Oral at bedtime  morphine  - Injectable 2 milliGRAM(s) IV Push every 6 hours PRN  pantoprazole    Tablet 40 milliGRAM(s) Oral before breakfast  polyethylene glycol 3350 17 Gram(s) Oral daily  senna 2 Tablet(s) Oral at bedtime  traMADol 25 milliGRAM(s) Oral every 6 hours PRN  traMADol 50 milliGRAM(s) Oral every 8 hours PRN       REVIEW OF SYSTEMS:  unable to obtain 2/2 dementia     Physical Exam:  ICU Vital Signs Last 24 Hrs  T(C): 37.2 (07 Jul 2023 11:51), Max: 37.4 (06 Jul 2023 20:51)  T(F): 98.9 (07 Jul 2023 11:51), Max: 99.3 (06 Jul 2023 20:51)  HR: 82 (07 Jul 2023 11:51) (82 - 96)  BP: 153/62 (07 Jul 2023 11:51) (136/67 - 170/79)  BP(mean): --  ABP: --  ABP(mean): --  RR: 16 (07 Jul 2023 11:51) (16 - 18)  SpO2: 94% (07 Jul 2023 11:51) (94% - 96%)    O2 Parameters below as of 07 Jul 2023 11:51  Patient On (Oxygen Delivery Method): room air        GEN: asleep but easily arousable, not in apparent distress  HEENT: normocephalic and atraumatic.  NECK: Supple.    LUNGS: Normal respiratoty effort  HEART: Regular rate and rhythm   ABDOMEN: Soft, nontender, and nondistended.   EXTREMITIES: No leg edema. no noted RLE edema, RUE AV fistula  NEUROLOGIC: AO x 1  SKIN: stage 2 sacral ulcer; no erythema/swelling on both feet      Labs:  07-07    131<L>  |  94<L>  |  61<H>  ----------------------------<  294<H>  4.6   |  23  |  5.30<H>    Ca    10.2<H>      07 Jul 2023 07:27                            12.3   15.89 )-----------( 442      ( 07 Jul 2023 07:27 )             37.5       Urinalysis Basic - ( 07 Jul 2023 07:27 )    Color: x / Appearance: x / SG: x / pH: x  Gluc: 294 mg/dL / Ketone: x  / Bili: x / Urobili: x   Blood: x / Protein: x / Nitrite: x   Leuk Esterase: x / RBC: x / WBC x   Sq Epi: x / Non Sq Epi: x / Bacteria: x          RECENT CULTURES:  07-03 @ 21:20 Clean Catch Clean Catch (Midstream)     >100,000 CFU/ml Pseudomonas aeruginosa  50,000 - 99,000 CFU/mL Klebsiella pneumoniae        07-03 @ 16:10 .Blood Blood     No growth at 72 Hours        07-03 @ 16:05 .Blood Blood     No growth at 72 Hours              All imaging and data are reviewed.       < from: CT Chest No Cont (07.07.23 @ 11:25) >  FINDINGS:    CHEST:    LUNGS AND LARGE AIRWAYS: PLEURA:    Persistent moderate left-sided pleural effusion with extensive left lower   lobe partial atelectasis.    No pneumothorax.    The central airways are patent.    VESSELS:  Atherosclerotic changes thoracic aorta and coronary artery calcification.    HEART: Heart size is normal. No pericardial effusion.    MEDIASTINUM AND ZOHAIB:  No enlarged lymphadenopathy.    CHEST WALL AND LOWER NECK: Within normal limits.    ABDOMEN AND PELVIS:    Patient rotated and artifact degrades image quality.    The evaluation of the solid organ parenchyma is limited without   intravenous contrast.    LIVER: Coarse capsular calcification posterior right hepatic lobe.  BILE DUCTS: Normal caliber.  GALLBLADDER: Within normal limits.  SPLEEN: Within normal limits.  PANCREAS: Within normal limits.  ADRENALS: 1.5 cm left adrenal adenoma.  KIDNEYS/URETERS:  Prominent bilateral intrarenal vascular calcifications.  No hydronephrosis.    BLADDER: Within normal limits.  REPRODUCTIVE ORGANS: No pelvic mass.    BOWEL:   Colonic fecal retention; no bowel obstruction.  PERITONEUM: No ascites.    Vasculature:  Extensive atherosclerotic calcification, abdominal aorta normal in   caliber.  Femoral bypass graft.    RETROPERITONEUM/LYMPH NODES: No lymphadenopathy.    ABDOMINAL WALL:  Mild presacral stranding.    BONES:  Comminuted right acetabular fracture extending into the pubic acetabular   junction, superior and inferior pubic rami.  Probable hematoma extending along the right iliac fossa and right   obturator internus pelvic sidewall.    IMPRESSION:    Moderate left-sided pleural effusion with extensive partial atelectasis   left lower lobe.    No acute abdominal pathology, given limitations of noncontrast exam.    Comminuted right acetabular and pelvic fractures.  See separate report from CT scan pelvis 7/1/2023.    Other findings as discussed above.    < end of copied text >

## 2023-07-08 PROCEDURE — 99233 SBSQ HOSP IP/OBS HIGH 50: CPT

## 2023-07-08 RX ORDER — MIDODRINE HYDROCHLORIDE 2.5 MG/1
10 TABLET ORAL
Refills: 0 | Status: DISCONTINUED | OUTPATIENT
Start: 2023-07-08 | End: 2023-07-12

## 2023-07-08 RX ORDER — INSULIN GLARGINE 100 [IU]/ML
8 INJECTION, SOLUTION SUBCUTANEOUS AT BEDTIME
Refills: 0 | Status: DISCONTINUED | OUTPATIENT
Start: 2023-07-08 | End: 2023-07-10

## 2023-07-08 RX ORDER — MEROPENEM 1 G/30ML
500 INJECTION INTRAVENOUS EVERY 24 HOURS
Refills: 0 | Status: COMPLETED | OUTPATIENT
Start: 2023-07-08 | End: 2023-07-10

## 2023-07-08 RX ORDER — INSULIN LISPRO 100/ML
VIAL (ML) SUBCUTANEOUS
Refills: 0 | Status: DISCONTINUED | OUTPATIENT
Start: 2023-07-08 | End: 2023-07-11

## 2023-07-08 RX ADMIN — POLYETHYLENE GLYCOL 3350 17 GRAM(S): 17 POWDER, FOR SOLUTION ORAL at 11:10

## 2023-07-08 RX ADMIN — LIDOCAINE 1 PATCH: 4 CREAM TOPICAL at 11:11

## 2023-07-08 RX ADMIN — Medication 100 MILLIGRAM(S): at 05:29

## 2023-07-08 RX ADMIN — Medication 81 MILLIGRAM(S): at 11:10

## 2023-07-08 RX ADMIN — APIXABAN 2.5 MILLIGRAM(S): 2.5 TABLET, FILM COATED ORAL at 05:28

## 2023-07-08 RX ADMIN — SENNA PLUS 2 TABLET(S): 8.6 TABLET ORAL at 21:52

## 2023-07-08 RX ADMIN — CEFEPIME 100 MILLIGRAM(S): 1 INJECTION, POWDER, FOR SOLUTION INTRAMUSCULAR; INTRAVENOUS at 11:01

## 2023-07-08 RX ADMIN — INSULIN GLARGINE 8 UNIT(S): 100 INJECTION, SOLUTION SUBCUTANEOUS at 21:58

## 2023-07-08 RX ADMIN — Medication 5: at 08:28

## 2023-07-08 RX ADMIN — LIDOCAINE 1 PATCH: 4 CREAM TOPICAL at 23:41

## 2023-07-08 RX ADMIN — LIDOCAINE 1 PATCH: 4 CREAM TOPICAL at 19:41

## 2023-07-08 RX ADMIN — Medication 50 MILLIGRAM(S): at 11:10

## 2023-07-08 RX ADMIN — Medication 5: at 12:43

## 2023-07-08 RX ADMIN — Medication 4: at 18:48

## 2023-07-08 RX ADMIN — APIXABAN 2.5 MILLIGRAM(S): 2.5 TABLET, FILM COATED ORAL at 18:48

## 2023-07-08 RX ADMIN — Medication 60 MILLIGRAM(S): at 15:59

## 2023-07-08 RX ADMIN — Medication 0.1 MILLIGRAM(S): at 05:28

## 2023-07-08 RX ADMIN — Medication 60 MILLIGRAM(S): at 21:41

## 2023-07-08 RX ADMIN — MEROPENEM 100 MILLIGRAM(S): 1 INJECTION INTRAVENOUS at 18:48

## 2023-07-08 RX ADMIN — MIRTAZAPINE 7.5 MILLIGRAM(S): 45 TABLET, ORALLY DISINTEGRATING ORAL at 21:52

## 2023-07-08 RX ADMIN — Medication 2.5 MILLIGRAM(S): at 19:05

## 2023-07-08 RX ADMIN — Medication 2: at 21:48

## 2023-07-08 RX ADMIN — Medication 60 MILLIGRAM(S): at 05:29

## 2023-07-08 NOTE — PROGRESS NOTE ADULT - ASSESSMENT
REASON FOR VISIT  .. Management of problems listed below      NOTEWORTHY FINDINGS.     PHYSICAL EXAM    HEENT Unremarkable  atraumatic   RESP Fair air entry  Harsh breath sound   CARDIAC S1 S2 No S3     NO JVD    ABDOMEN No hepatosplenomegaly   PEDAL EDEMA present No calf tenderness  NO rash       GENERAL DATA .     GOC.      ICU STAY.   .. none  COVID.   ..      BEST PRACTICE ISSUES.    HOB ELEVATN.   .. Yes  DVT PPLX.   .. 7/3/2023 apixa 2.5 x2 restarted as IR feels we should tap fluid only if she develops shortness of breath  ..    7/2/2023 Apixaba 2.5 x2 held for thorac   STRESS ULCER PPLX.   ..      INFECTION PPLX.   ..    SP SW AMINAH.    ..        DIET.    ..  7/1/2023 renal restrns   IV fl.  ..      PROCEDURES.  ..   PAST PROCEDURES.    ..     GENERAL DATA .     ALLGY.  ..     latex                     WT.  ..  7/1/2023 54  BMI.  ..    7/1/2023 17       ABGS.    VS/ PO/IO/ VENT/ DRIPS.  7/8/2023 afeb 59 120/40   7/8/2023 ra 98%      CC/DOA.        . 7/1/2023 Pt sent from [facility] for unwitnessed fall out of bed.       .  PRESENTATION.   . 7/1/2023 72-year-old female former smoker quit 3/2012  with PMH for hypertension, CAD sp cabg  PVD   sp R-> L bifem - fem BK pop bypass  Fem pop bypass 6/21/2022  Partial ray amputation r great toe 6/22/2022  diabetes, dementia, ESRD on hemodialysis   a fib on eliquis sp recent hosp stay 5/17-5/24/2023     . ENTERORHINOVIRUS INFECTION RESP TRACT 5/17  . PLEURAL EFFUSION SP l THORA 5/18 TRANSUDATE     COURSE   . ACETABULAR Fx Ortho recommended non surgical mgmt  . PL EFFSN Was decided to hold off thora unless she develops sob    PROBLEMS/ASSESSMENT/RECOMMENDATIONS (A/R).  PULMONARY.  . GAS EXCHANGE.  .. A/R.   .. Monitor pulse ox and target po 90-95%    . PLEURAL EFFSN.  .. RELEVANT PAST HISTORU  .. 5/18/2023 l thorac 1.5 l   .. 5/18 pl fl l 13 m 73 p 5 afb sm (-) g 131 l 102/268 (.38) ph 7.6 pr 2.6/6.4 (.4)  .. Fluid transudate  .. WORKUP   .. CXR 7/1/2023 cxr Mod large l pl effs or lower zone airspace consolidation/atelecatsis   .. CT ch 7/1/2023 Ct ch   .... Decreased mod l effs since 5/17/2023   .. EVENTS  .. 7/2/2023 DW pt and dw son consensus is that they want fluid remived   .. 7/3/2023 dw ir plan changed plan is to tap if she becomes symptimatic   .. A/R  .. 7/3/2023 Thora cancelled as pt is comfortable will pan thora if she becomes symptomatic     ID.  .. WORKUP.  .. W 7/1-7/2-7/3-7/6-7/7/2023 W 15 - 14 - 13- 13 - 15  .. EVENTS.   .. 7/1/2023 Checlk blood and urine cs   .. uc 7/3/2023 100 k pseudomonas carbapenem resist    .. uc 7/3/2023 50-99 Klebs esbl   .. ABIO.  .. 7/8/2023 meropenem 500 x 3d Dr ALCAZAR.     CARDIO.  . HEMODYNAMICS.  .. 7/8/2023 midodrine 10.3 Dr CLIFFORD     . CAD.  .. 7/1/2023 ASA 81   .. A/R  .. cont rx    . A fib.  .. 7/1/2023 CARDIZEM 60.3   .. 7/3/2023 apixaba 2.5 x2   .. AR  .. Cont rx    HEMAT.  .. WORKUP.  .. Hb 7/1-7/2-7/3-7/6/2023 Hb 13 - 11.8 - 11.2 - 11  .. INR 7/1/2023    .. A/R  .. As pt was on apixaba will monitorserially     RENAL.  . ESRD.  .. WORKUP  .. NA 7/1/2023    .. K 7/1/2023 K 5.1   .. CR 7/1/2023 CR 5    ORTHO.  . FALL 7/1/2023.  .. CT head C sp 7/1/2023 CT HC (-)     . FRACTURE ACETABULUM r INFERIOR PUBIC RAMUS r 7/1/2023   .. IMAGING.  .. XR Pelvis and r hip 7/1/2023 XR Fractures r acetabulum and inf r pubic ramus   .. 7/1/2023 ct pelvix   .... comminuted r acetabular fx involving r sup and inf pubic rami medial wallacetabulum sup acetabulum and post wall acetabulum   .... small hematoma r pelvic sidewall   .. ORTHO.  .. 7/1/2023 DW Dr Dias She spoke to Ortho Dr Jenkins group and plan is for nonsurgical management  .. A/R  .. Monitor serial Hb ro bleed     . MAIN ISSUES  .. FX acetabulum on conservative rx  . A fib on doac  . ESRD   . Pl effs for thora when sob  . 7/8/2023 started on meropenem by ID (UTI)     TIME SPENT   . About 36 minutes aggregate care time spent on encounter; activities included   direct patient care, counseling and/or coordinating care reviewing notes, lab data/ imaging , discussion with multidisciplinary team/ patient  /family and explaining in detail risks, benefits, alternatives  of the recommendations     JOSE F LAO 72 f7/1/2023 1948 DR HARRY ALBRIGHT

## 2023-07-08 NOTE — PROGRESS NOTE ADULT - SUBJECTIVE AND OBJECTIVE BOX
Patient is a 72y Female with a known history of :  Hip fracture [S72.009A]    Closed pelvic fracture [S32.9XXA]    Pubic ramus fracture [S32.599A]    Right acetabular fracture [S32.401A]    ESRD on dialysis [N18.6]    Prophylactic measure [Z29.9]    HTN (hypertension) [I10]    Fall [W19.XXXA]    Pleural effusion [J90]    Acute febrile illness [R50.9]    Type 2 diabetes mellitus [E11.9]      HPI:  72-year-old female with significant past medical history for hypertension, diabetes, dementia, end-stage renal disease on hemodialysis presents to the ED status post unwitnessed fall from [facility].  Patient states that she heard some voices then rolled out of bed.  Patient complaining of right hip pain.  Unknown head trauma.  + Eliquis < from: Xray Femur showed  Fractures including the medial wall of the acetabulum and inferior right pubic ramus Admitted for pain management ,PT/OT        (01 Jul 2023 15:24)      REVIEW OF SYSTEMS:    CONSTITUTIONAL: No fever, weight loss, or fatigue  EYES: No eye pain, visual disturbances, or discharge  ENMT:  No difficulty hearing, tinnitus, vertigo; No sinus or throat pain  NECK: No pain or stiffness  BREASTS: No pain, masses, or nipple discharge  RESPIRATORY: No cough, wheezing, chills or hemoptysis; No shortness of breath  CARDIOVASCULAR: No chest pain, palpitations, dizziness, or leg swelling  GASTROINTESTINAL: No abdominal or epigastric pain. No nausea, vomiting, or hematemesis; No diarrhea or constipation. No melena or hematochezia.  GENITOURINARY: No dysuria, frequency, hematuria, or incontinence  NEUROLOGICAL: No headaches, memory loss, loss of strength, numbness, or tremors  SKIN: No itching, burning, rashes, or lesions   LYMPH NODES: No enlarged glands  ENDOCRINE: No heat or cold intolerance; No hair loss  MUSCULOSKELETAL: No joint pain or swelling; No muscle, back, or extremity pain  PSYCHIATRIC: No depression, anxiety, mood swings, or difficulty sleeping  HEME/LYMPH: No easy bruising, or bleeding gums  ALLERGY AND IMMUNOLOGIC: No hives or eczema    MEDICATIONS  (STANDING):  apixaban 2.5 milliGRAM(s) Oral two times a day  aspirin enteric coated 81 milliGRAM(s) Oral daily  cefepime   IVPB      cefepime   IVPB 1000 milliGRAM(s) IV Intermittent every 24 hours  cloNIDine 0.1 milliGRAM(s) Oral two times a day  dextrose 5%. 1000 milliLiter(s) (50 mL/Hr) IV Continuous <Continuous>  dextrose 5%. 1000 milliLiter(s) (100 mL/Hr) IV Continuous <Continuous>  dextrose 50% Injectable 12.5 Gram(s) IV Push once  dextrose 50% Injectable 25 Gram(s) IV Push once  dextrose 50% Injectable 25 Gram(s) IV Push once  diltiazem    Tablet 60 milliGRAM(s) Oral three times a day  dronabinol 2.5 milliGRAM(s) Oral two times a day before meals  glucagon  Injectable 1 milliGRAM(s) IntraMuscular once  imipramine 50 milliGRAM(s) Oral daily  insulin glargine Injectable (LANTUS) 6 Unit(s) SubCutaneous at bedtime  insulin lispro (ADMELOG) corrective regimen sliding scale   SubCutaneous three times a day before meals  insulin lispro (ADMELOG) corrective regimen sliding scale   SubCutaneous at bedtime  lidocaine   4% Patch 1 Patch Transdermal daily  metoprolol tartrate 100 milliGRAM(s) Oral two times a day  mirtazapine 7.5 milliGRAM(s) Oral at bedtime  pantoprazole    Tablet 40 milliGRAM(s) Oral before breakfast  polyethylene glycol 3350 17 Gram(s) Oral daily  senna 2 Tablet(s) Oral at bedtime    MEDICATIONS  (PRN):  acetaminophen     Tablet .. 650 milliGRAM(s) Oral every 6 hours PRN Temp greater or equal to 38C (100.4F), Mild Pain (1 - 3)  dextrose Oral Gel 15 Gram(s) Oral once PRN Blood Glucose LESS THAN 70 milliGRAM(s)/deciliter  morphine  - Injectable 2 milliGRAM(s) IV Push every 6 hours PRN Severe Pain (7 - 10)  traMADol 25 milliGRAM(s) Oral every 6 hours PRN Moderate Pain (4 - 6)  traMADol 50 milliGRAM(s) Oral every 8 hours PRN Severe Pain (7 - 10)      ALLERGIES: No Known Drug Allergies  latex (Hives)      FAMILY HISTORY:  FH: myocardial infarction (Father)    FH: myocardial infarction (Father)    Family history of type 2 diabetes mellitus in brother (Sibling)        PHYSICAL EXAMINATION:  -----------------------------  T(C): 37.4 (07-08-23 @ 05:14), Max: 37.4 (07-08-23 @ 05:14)  HR: 95 (07-08-23 @ 05:14) (77 - 95)  BP: 157/76 (07-08-23 @ 05:14) (105/66 - 157/76)  RR: 16 (07-08-23 @ 05:14) (16 - 16)  SpO2: 94% (07-08-23 @ 05:14) (94% - 94%)  Wt(kg): --        VITALS  T(C): 37.4 (07-08-23 @ 05:14), Max: 37.4 (07-08-23 @ 05:14)  HR: 95 (07-08-23 @ 05:14) (77 - 95)  BP: 157/76 (07-08-23 @ 05:14) (105/66 - 157/76)  RR: 16 (07-08-23 @ 05:14) (16 - 16)  SpO2: 94% (07-08-23 @ 05:14) (94% - 94%)    Constitutional: well developed, normal appearance, well groomed, well nourished, no deformities and no acute distress.   Eyes: the conjunctiva exhibited no abnormalities and the eyelids demonstrated no xanthelasmas.   HEENT: normal oral mucosa, no oral pallor and no oral cyanosis.   Neck: normal jugular venous A waves present, normal jugular venous V waves present and no jugular venous wilson A waves.   Pulmonary: no respiratory distress, normal respiratory rhythm and effort, no accessory muscle use and lungs were clear to auscultation bilaterally.   Cardiovascular: heart rate and rhythm were normal, normal S1 and S2 and no murmur, gallop, rub, heave or thrill are present.   Abdomen: soft, non-tender, no hepato-splenomegaly and no abdominal mass palpated.   Musculoskeletal: the gait could not be assessed..   Extremities: no clubbing of the fingernails, no localized cyanosis, no petechial hemorrhages and no ischemic changes.   Skin: normal skin color and pigmentation, no rash, no venous stasis, no skin lesions, no skin ulcer and no xanthoma was observed.   Psychiatric: oriented to person, place, and time, the affect was normal, the mood was normal and not feeling anxious.     LABS:   --------  07-07    131<L>  |  94<L>  |  61<H>  ----------------------------<  294<H>  4.6   |  23  |  5.30<H>    Ca    10.2<H>      07 Jul 2023 07:27                           12.3   15.89 )-----------( 442      ( 07 Jul 2023 07:27 )             37.5                 RADIOLOGY:  -----------------    ECG:     ECHO:

## 2023-07-08 NOTE — PROGRESS NOTE ADULT - SUBJECTIVE AND OBJECTIVE BOX
Date of Service: 07-08-23 @ 12:21    Patient is a 72y old  Female who presents with a chief complaint of s/p fall (08 Jul 2023 11:29)      INTERVAL HPI/OVERNIGHT EVENTS: Patient seen and examined. NAD. No complaints.     Vital Signs Last 24 Hrs  T(C): 37.4 (08 Jul 2023 05:14), Max: 37.4 (08 Jul 2023 05:14)  T(F): 99.3 (08 Jul 2023 05:14), Max: 99.3 (08 Jul 2023 05:14)  HR: 95 (08 Jul 2023 05:14) (77 - 95)  BP: 157/76 (08 Jul 2023 05:14) (105/66 - 157/76)  BP(mean): --  RR: 16 (08 Jul 2023 05:14) (16 - 16)  SpO2: 94% (08 Jul 2023 05:14) (94% - 94%)    Parameters below as of 08 Jul 2023 05:14  Patient On (Oxygen Delivery Method): room air        07-07    131<L>  |  94<L>  |  61<H>  ----------------------------<  294<H>  4.6   |  23  |  5.30<H>    Ca    10.2<H>      07 Jul 2023 07:27                            12.3   15.89 )-----------( 442      ( 07 Jul 2023 07:27 )             37.5       CAPILLARY BLOOD GLUCOSE      POCT Blood Glucose.: 366 mg/dL (08 Jul 2023 12:05)  POCT Blood Glucose.: 395 mg/dL (08 Jul 2023 07:57)  POCT Blood Glucose.: 276 mg/dL (07 Jul 2023 21:55)  POCT Blood Glucose.: 287 mg/dL (07 Jul 2023 17:19)  POCT Blood Glucose.: 440 mg/dL (07 Jul 2023 12:23)    Urinalysis Basic - ( 07 Jul 2023 07:27 )    Color: x / Appearance: x / SG: x / pH: x  Gluc: 294 mg/dL / Ketone: x  / Bili: x / Urobili: x   Blood: x / Protein: x / Nitrite: x   Leuk Esterase: x / RBC: x / WBC x   Sq Epi: x / Non Sq Epi: x / Bacteria: x    < from: CT Abdomen and Pelvis No Cont (07.07.23 @ 11:25) >    ACC: [id number] EXAM:  CT ABDOMEN AND PELVIS   ORDERED BY: DUC CANSECO     ACC: [id number] EXAM:  CT CHEST   ORDERED BY: DUC CANSECO     PROCEDURE DATE:  07/07/2023          INTERPRETATION:  CLINICAL INFORMATION: Leukocytosis.  Patient admitted with fall with right acetabulum pelvic fractures.    COMPARISON: CT scan chest 7/1/2023.    CONTRAST/COMPLICATIONS:  IV Contrast: NONE  Oral Contrast: NONE  Complications: None reported at time of study completion    PROCEDURE:  CT of the Chest, Abdomen and Pelvis was performed.  Sagittal and coronal reformats were performed.    FINDINGS:    CHEST:    LUNGS AND LARGE AIRWAYS: PLEURA:    Persistent moderate left-sided pleural effusion with extensive left lower   lobe partial atelectasis.    No pneumothorax.    The central airways are patent.    VESSELS:  Atherosclerotic changes thoracic aorta and coronary artery calcification.    HEART: Heart size is normal. No pericardial effusion.    MEDIASTINUM AND ZOHAIB:  No enlarged lymphadenopathy.    CHEST WALL AND LOWER NECK: Within normal limits.    ABDOMEN AND PELVIS:    Patient rotated and artifact degrades image quality.    The evaluation of the solid organ parenchyma is limited without   intravenous contrast.    LIVER: Coarse capsular calcification posterior right hepatic lobe.  BILE DUCTS: Normal caliber.  GALLBLADDER: Within normal limits.  SPLEEN: Within normal limits.  PANCREAS: Within normal limits.  ADRENALS: 1.5 cm left adrenal adenoma.  KIDNEYS/URETERS:  Prominent bilateral intrarenal vascular calcifications.  No hydronephrosis.    BLADDER: Within normal limits.  REPRODUCTIVE ORGANS: No pelvic mass.    BOWEL:   Colonic fecal retention; no bowel obstruction.  PERITONEUM: No ascites.    Vasculature:  Extensive atherosclerotic calcification, abdominal aorta normal in   caliber.  Femoral bypass graft.    RETROPERITONEUM/LYMPH NODES: No lymphadenopathy.    ABDOMINAL WALL:  Mild presacral stranding.    BONES:  Comminuted right acetabular fracture extending into the pubic acetabular   junction, superior and inferior pubic rami.  Probable hematoma extending along the right iliac fossa and right   obturator internus pelvic sidewall.    IMPRESSION:    Moderate left-sided pleural effusion with extensive partial atelectasis   left lower lobe.    No acute abdominal pathology, given limitations of noncontrast exam.    Comminuted right acetabular and pelvic fractures.  See separate report from CT scan pelvis 7/1/2023.    Other findings as discussed above.    --- End of Report ---            RICHY DHILLON MD; Attending Radiologist  This document has been electronically signed. Jul 7 2023  3:12PM    < end of copied text >    Culture - Urine (07.03.23 @ 21:20)   - Amikacin: S <=16  - Amikacin: S <=16  - Amoxicillin/Clavulanic Acid: I 16/8  - Ampicillin: R >16 These ampicillin results predict results for amoxicillin  - Ampicillin/Sulbactam: R >16/8 Enterobacter, Klebsiella aerogenes, Citrobacter, and Serratia may develop resistance during prolonged therapy (3-4 days)  - Aztreonam: R >16  - Aztreonam: R >16  - Cefazolin: R >16 For uncomplicated UTI with K. pneumoniae, E. coli, or P. mirablis: SONIA <=16 is sensitive and SONIA >=32 is resistant. This also predicts results for oral agents cefaclor, cefdinir, cefpodoxime, cefprozil, cefuroxime axetil, cephalexin and locarbef for uncomplicated UTI. Note that some isolates may be susceptible to these agents while testing resistant to cefazolin.  - Cefepime: R >16  - Cefepime: I 16  - Ceftazidime: S 8  - Cefuroxime: R >16  - Ciprofloxacin: R >2  - Ciprofloxacin: R >2  - Ceftriaxone: R >32 Enterobacter, Klebsiella aerogenes, Citrobacter, and Serratia may develop resistance during prolonged therapy  - Ertapenem: S <=0.5  - Gentamicin: R >8  - Gentamicin: S <=2  - Imipenem: R >8  - Imipenem: S <=1  - Levofloxacin: R >4  - Levofloxacin: R >4  - Meropenem: R >8  - Meropenem: S <=1  - Piperacillin/Tazobactam: R 64  - Piperacillin/Tazobactam: S 16  - Tobramycin: S <=2  - Tobramycin: R >8  - Trimethoprim/Sulfamethoxazole: R >2/38  - Nitrofurantoin: R >64 Should not be used to treat pyelonephritis  - Ceftolozane/tazobactam: S <=2  - Ceftazidime/Avibactam: S 8  - Resistance Gene to Carbapenem: Nondet  Specimen Source: Clean Catch Clean Catch (Midstream)  Culture Results:   >100,000 CFU/ml Pseudomonas aeruginosa (Carbapenem Resistant)   50,000 - 99,000 CFU/mL Klebsiella pneumoniae ESBL  Organism Identification: Pseudomonas aeruginosa (Carbapenem Resistant)   Klebsiella pneumoniae ESBL  Organism: Pseudomonas aeruginosa (Carbapenem Resistant)  Organism: Pseudomonas aeruginosa (Carbapenem Resistant)  Organism: Klebsiella pneumoniae ESBL  Method Type: CarbaR  Method Type: SONIA  Method Type: SONIA            acetaminophen     Tablet .. 650 milliGRAM(s) Oral every 6 hours PRN  apixaban 2.5 milliGRAM(s) Oral two times a day  aspirin enteric coated 81 milliGRAM(s) Oral daily  cefepime   IVPB      cefepime   IVPB 1000 milliGRAM(s) IV Intermittent every 24 hours  cloNIDine 0.1 milliGRAM(s) Oral two times a day  dextrose 5%. 1000 milliLiter(s) IV Continuous <Continuous>  dextrose 5%. 1000 milliLiter(s) IV Continuous <Continuous>  dextrose 50% Injectable 12.5 Gram(s) IV Push once  dextrose 50% Injectable 25 Gram(s) IV Push once  dextrose 50% Injectable 25 Gram(s) IV Push once  dextrose Oral Gel 15 Gram(s) Oral once PRN  diltiazem    Tablet 60 milliGRAM(s) Oral three times a day  dronabinol 2.5 milliGRAM(s) Oral two times a day before meals  glucagon  Injectable 1 milliGRAM(s) IntraMuscular once  imipramine 50 milliGRAM(s) Oral daily  insulin glargine Injectable (LANTUS) 6 Unit(s) SubCutaneous at bedtime  insulin lispro (ADMELOG) corrective regimen sliding scale   SubCutaneous three times a day before meals  insulin lispro (ADMELOG) corrective regimen sliding scale   SubCutaneous at bedtime  lidocaine   4% Patch 1 Patch Transdermal daily  metoprolol tartrate 100 milliGRAM(s) Oral two times a day  midodrine 10 milliGRAM(s) Oral <User Schedule> PRN  mirtazapine 7.5 milliGRAM(s) Oral at bedtime  morphine  - Injectable 2 milliGRAM(s) IV Push every 6 hours PRN  pantoprazole    Tablet 40 milliGRAM(s) Oral before breakfast  polyethylene glycol 3350 17 Gram(s) Oral daily  senna 2 Tablet(s) Oral at bedtime  traMADol 25 milliGRAM(s) Oral every 6 hours PRN  traMADol 50 milliGRAM(s) Oral every 8 hours PRN              REVIEW OF SYSTEMS:  CONSTITUTIONAL: No fever, no weight loss, or no fatigue  NECK: No pain, no stiffness  RESPIRATORY: No cough, no wheezing, no chills, no hemoptysis, No shortness of breath  CARDIOVASCULAR: No chest pain, no palpitations, no dizziness, no leg swelling  GASTROINTESTINAL: No abdominal pain. No nausea, no vomiting, no hematemesis; No diarrhea, no constipation. No melena, no hematochezia.  GENITOURINARY: No dysuria, no frequency, no hematuria, no incontinence  NEUROLOGICAL: No headaches, no loss of strength, no numbness, no tremors  SKIN: No itching, no burning  MUSCULOSKELETAL: No joint pain, no swelling; No muscle, no back, no extremity pain  PSYCHIATRIC: No depression, no mood swings,   HEME/LYMPH: No easy bruising, no bleeding gums  ALLERY AND IMMUNOLOGIC: No hives       Consultant(s) Notes Reviewed:  [X] YES  [ ] NO    PHYSICAL EXAM:  GENERAL: NAD  HEAD:  Atraumatic, Normocephalic  EYES: EOMI, PERRLA, conjunctiva and sclera clear  ENMT: No tonsillar erythema, exudates, or enlargement; Moist mucous membranes  NECK: Supple, No JVD  NERVOUS SYSTEM:  Awake & alert  CHEST/LUNG: Clear to auscultation bilaterally; No rales, rhonchi, wheezing,  HEART: Regular rate and rhythm  ABDOMEN: Soft, Nontender, Nondistended; Bowel sounds present  EXTREMITIES:  No clubbing, cyanosis, or edema  LYMPH: No lymphadenopathy noted  SKIN: No rashes      Advanced care planning discussed with patient/family [X] YES   [ ] NO    Advanced care planning discussed with patient/family. Patient's health status was discussed. All appropriate changes have been made regarding patient's end-of-life care. Advanced care planning forms reviewed/discussed/completed.  20 minutes spent.

## 2023-07-08 NOTE — PROGRESS NOTE ADULT - SUBJECTIVE AND OBJECTIVE BOX
CHIEF COMPLAINT/ REASON FOR VISIT  .. Patient was seen to address the  issue listed under PROBLEM LIST which is located toward bottom of this note     SHILO KOHLER    PLSHARON [id number] [id number] D1    Allergies    No Known Drug Allergies  latex (Hives)    Intolerances        PAST MEDICAL & SURGICAL HISTORY:  HTN (hypertension)      h/o Anxiety attack      Depression      h/o Myocardial infarct 2007      h/o Hepatitis A 1969  currently resolved, no symptoms      Murmur, cardiac      h/o Smoking  quitted 3/2012      Anemia secondary to renal failure      ESRD on dialysis      Falls      Ataxia      Type 2 diabetes mellitus      Peripheral vascular disease, unspecified      CAD (coronary artery disease)      Diabetes      coronary stent 2007      s/p Ovarian cyst removal      s/p surgical removal of benign Skin lesion epigastric area      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          FAMILY HISTORY:  FH: myocardial infarction (Father)    FH: myocardial infarction (Father)    Family history of type 2 diabetes mellitus in brother (Sibling)        Home Medications:  acetaminophen 325 mg oral tablet: 2 tab(s) orally every 6 hours as needed (02 Jul 2023 15:24)  Admelog SoloStar 100 units/mL injectable solution: injectable 3 times a day (before meals)sliding scale  (02 Jul 2023 15:24)  apixaban 2.5 mg oral tablet: 1 tab(s) orally 2 times a day (02 Jul 2023 15:24)  aspirin 81 mg oral delayed release tablet: 1 tab(s) orally once a day (at bedtime) (02 Jul 2023 15:24)  atorvastatin 20 mg oral tablet: 1 tab(s) orally once a day (at bedtime) (02 Jul 2023 15:24)  bacitracin 500 units/g topical ointment: Apply topically to affected area once a day to right knee abrasion (02 Jul 2023 11:54)  Basaglar KwikPen 100 units/mL subcutaneous solution: 8 international unit(s) subcutaneous once a day (at bedtime) (02 Jul 2023 15:24)  cloNIDine 0.1 mg oral tablet: 1 tab(s) orally 2 times a day (02 Jul 2023 15:24)  DilTIAZem (Eqv-Cardizem CD) 180 mg/24 hours oral capsule, extended release: 1 cap(s) orally once a day (02 Jul 2023 15:24)  imipramine 50 mg oral tablet: 1 tab(s) orally once a day (in the evening) (02 Jul 2023 15:24)  metoprolol tartrate 100 mg oral tablet: 1 tab(s) orally 2 times a day (02 Jul 2023 15:24)  mirtazapine 7.5 mg oral tablet: 1 tab(s) orally once a day (at bedtime) (02 Jul 2023 15:24)  Nephro-Alfie oral tablet: 1 tab(s) orally once a day (02 Jul 2023 15:24)  PANTOPRAZOLE 40MG DR TAB: 1 tab(s) orally once a day (02 Jul 2023 15:24)  senna leaf extract oral tablet: 2 tab(s) orally once a day (at bedtime) (02 Jul 2023 15:24)      MEDICATIONS  (STANDING):  apixaban 2.5 milliGRAM(s) Oral two times a day  aspirin enteric coated 81 milliGRAM(s) Oral daily  cefepime   IVPB      cefepime   IVPB 1000 milliGRAM(s) IV Intermittent every 24 hours  cloNIDine 0.1 milliGRAM(s) Oral two times a day  dextrose 5%. 1000 milliLiter(s) (50 mL/Hr) IV Continuous <Continuous>  dextrose 5%. 1000 milliLiter(s) (100 mL/Hr) IV Continuous <Continuous>  dextrose 50% Injectable 12.5 Gram(s) IV Push once  dextrose 50% Injectable 25 Gram(s) IV Push once  dextrose 50% Injectable 25 Gram(s) IV Push once  diltiazem    Tablet 60 milliGRAM(s) Oral three times a day  dronabinol 2.5 milliGRAM(s) Oral two times a day before meals  glucagon  Injectable 1 milliGRAM(s) IntraMuscular once  imipramine 50 milliGRAM(s) Oral daily  insulin glargine Injectable (LANTUS) 6 Unit(s) SubCutaneous at bedtime  insulin lispro (ADMELOG) corrective regimen sliding scale   SubCutaneous three times a day before meals  insulin lispro (ADMELOG) corrective regimen sliding scale   SubCutaneous at bedtime  lidocaine   4% Patch 1 Patch Transdermal daily  metoprolol tartrate 100 milliGRAM(s) Oral two times a day  mirtazapine 7.5 milliGRAM(s) Oral at bedtime  pantoprazole    Tablet 40 milliGRAM(s) Oral before breakfast  polyethylene glycol 3350 17 Gram(s) Oral daily  senna 2 Tablet(s) Oral at bedtime    MEDICATIONS  (PRN):  acetaminophen     Tablet .. 650 milliGRAM(s) Oral every 6 hours PRN Temp greater or equal to 38C (100.4F), Mild Pain (1 - 3)  dextrose Oral Gel 15 Gram(s) Oral once PRN Blood Glucose LESS THAN 70 milliGRAM(s)/deciliter  morphine  - Injectable 2 milliGRAM(s) IV Push every 6 hours PRN Severe Pain (7 - 10)  traMADol 25 milliGRAM(s) Oral every 6 hours PRN Moderate Pain (4 - 6)  traMADol 50 milliGRAM(s) Oral every 8 hours PRN Severe Pain (7 - 10)              Vital Signs Last 24 Hrs  T(C): 37.4 (08 Jul 2023 05:14), Max: 37.4 (08 Jul 2023 05:14)  T(F): 99.3 (08 Jul 2023 05:14), Max: 99.3 (08 Jul 2023 05:14)  HR: 95 (08 Jul 2023 05:14) (77 - 95)  BP: 157/76 (08 Jul 2023 05:14) (105/66 - 157/76)  BP(mean): --  RR: 16 (08 Jul 2023 05:14) (16 - 16)  SpO2: 94% (08 Jul 2023 05:14) (94% - 94%)    Parameters below as of 08 Jul 2023 05:14  Patient On (Oxygen Delivery Method): room air                  LABS:                        12.3   15.89 )-----------( 442      ( 07 Jul 2023 07:27 )             37.5     07-07    131<L>  |  94<L>  |  61<H>  ----------------------------<  294<H>  4.6   |  23  |  5.30<H>    Ca    10.2<H>      07 Jul 2023 07:27        Urinalysis Basic - ( 07 Jul 2023 07:27 )    Color: x / Appearance: x / SG: x / pH: x  Gluc: 294 mg/dL / Ketone: x  / Bili: x / Urobili: x   Blood: x / Protein: x / Nitrite: x   Leuk Esterase: x / RBC: x / WBC x   Sq Epi: x / Non Sq Epi: x / Bacteria: x            WBC:  WBC Count: 15.89 K/uL (07-07 @ 07:27)  WBC Count: 13.16 K/uL (07-06 @ 07:50)  WBC Count: 18.89 K/uL (07-05 @ 08:28)      MICROBIOLOGY:  RECENT CULTURES:  07-03 Clean Catch Clean Catch (Midstream) Pseudomonas aeruginosa (Carbapenem Resistant)  Klebsiella pneumoniae ESBL XXXX   >100,000 CFU/ml Pseudomonas aeruginosa (Carbapenem Resistant)  50,000 - 99,000 CFU/mL Klebsiella pneumoniae ESBL    07-03 .Blood Blood XXXX XXXX   No growth at 4 days    07-03 .Blood Blood XXXX XXXX   No growth at 4 days                    Sodium:  Sodium: 131 mmol/L (07-07 @ 07:27)  Sodium: 131 mmol/L (07-06 @ 07:50)  Sodium: 129 mmol/L (07-05 @ 08:28)      5.30 mg/dL 07-07 @ 07:27  6.70 mg/dL 07-06 @ 07:50  5.20 mg/dL 07-05 @ 08:28      Hemoglobin:  Hemoglobin: 12.3 g/dL (07-07 @ 07:27)  Hemoglobin: 11.1 g/dL (07-06 @ 07:50)  Hemoglobin: 11.2 g/dL (07-05 @ 08:28)      Platelets: Platelet Count - Automated: 442 K/uL (07-07 @ 07:27)  Platelet Count - Automated: 364 K/uL (07-06 @ 07:50)  Platelet Count - Automated: 326 K/uL (07-05 @ 08:28)          Urinalysis Basic - ( 07 Jul 2023 07:27 )    Color: x / Appearance: x / SG: x / pH: x  Gluc: 294 mg/dL / Ketone: x  / Bili: x / Urobili: x   Blood: x / Protein: x / Nitrite: x   Leuk Esterase: x / RBC: x / WBC x   Sq Epi: x / Non Sq Epi: x / Bacteria: x        RADIOLOGY & ADDITIONAL STUDIES:      MICROBIOLOGY:  RECENT CULTURES:  07-03 Clean Catch Clean Catch (Midstream) Pseudomonas aeruginosa (Carbapenem Resistant)  Klebsiella pneumoniae ESBL XXXX   >100,000 CFU/ml Pseudomonas aeruginosa (Carbapenem Resistant)  50,000 - 99,000 CFU/mL Klebsiella pneumoniae ESBL    07-03 .Blood Blood XXXX XXXX   No growth at 4 days    07-03 .Blood Blood XXXX XXXX   No growth at 4 days

## 2023-07-08 NOTE — PROGRESS NOTE ADULT - ASSESSMENT
. 7/1/2023 72-year-old female former smoker quit 3/2012  with significant past medical history for hypertension, CAD sp cabg  PVD  diabetes, dementia, end-stage renal disease on hemodialysis   a fib on eliquis sp recent hosp stay 5/17-5/24/2023  admitted 3/7-3/11/2023 and before that 2/22-2/25/2023   . During 5/2023 admission she had   . ENTERORHINOVIRUS INFECTION RESP TRACT 5/17  . PLEURAL EFFUSION SP l THORA 5/18 TRANSUDATE     . Patient now  presented to the ED 7/1/2023 status post unwitnessed fall from [facility].  Patient states that she heard some voices then rolled out of bed.  Patient complaining of right hip pain.  Unknown head trauma.  + Eliquis     She was found to have acetabular fracture which Ortho was contacted and they plan non operative management Pt was admitted to [facility] for bleed as she was on apixaban and was noted to have pl effsn    . 7/1/2023  I was asked to admit pt                      (01 Jul 2023 13:32)      esrd on hd tues thurs sat     ANEMIA PLAN:  Anemia of chronic disease:  Well controlled by Aranesp  H and H subtherapeutic .  We will continue Aranesp aiming for a HCT of 32-36 %.   We will monitor Iron stores, B12 and RBC folate .      BP monitoring,continue current antihypertensive meds, low salt diet,followup with PMD in 1-2 weeks      Accuchecks monitoring and insulin sliding scale coverage, no concentrated sweets diet, serial labs and f/up with PMD, monitor HB A 1 C every 3-4 mnth

## 2023-07-08 NOTE — CONSULT NOTE ADULT - SUBJECTIVE AND OBJECTIVE BOX
Patient is a 72y old  Female who presents with a chief complaint of s/p fall (08 Jul 2023 15:08)      Reason For Consult: dm2 uncontrolled    HPI:  72-year-old female with significant past medical history for hypertension, diabetes, dementia, end-stage renal disease on hemodialysis presents to the ED status post unwitnessed fall from [facility].  Patient states that she heard some voices then rolled out of bed.  Patient complaining of right hip pain.  Unknown head trauma.  + Eliquis < from: Xray Femur showed  Fractures including the medial wall of the acetabulum and inferior right pubic ramus Admitted for pain management ,PT/OT        (01 Jul 2023 15:24)      PAST MEDICAL & SURGICAL HISTORY:  HTN (hypertension)      h/o Anxiety attack      Depression      h/o Myocardial infarct 2007      h/o Hepatitis A 1969  currently resolved, no symptoms      Murmur, cardiac      h/o Smoking  quitted 3/2012      Anemia secondary to renal failure      ESRD on dialysis      Falls      Ataxia      Type 2 diabetes mellitus      Peripheral vascular disease, unspecified      CAD (coronary artery disease)      Diabetes      coronary stent 2007      s/p Ovarian cyst removal      s/p surgical removal of benign Skin lesion epigastric area      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          FAMILY HISTORY:  FH: myocardial infarction (Father)    FH: myocardial infarction (Father)    Family history of type 2 diabetes mellitus in brother (Sibling)          Social History:    MEDICATIONS  (STANDING):  apixaban 2.5 milliGRAM(s) Oral two times a day  aspirin enteric coated 81 milliGRAM(s) Oral daily  cloNIDine 0.1 milliGRAM(s) Oral two times a day  dextrose 5%. 1000 milliLiter(s) (100 mL/Hr) IV Continuous <Continuous>  dextrose 5%. 1000 milliLiter(s) (50 mL/Hr) IV Continuous <Continuous>  dextrose 50% Injectable 12.5 Gram(s) IV Push once  dextrose 50% Injectable 25 Gram(s) IV Push once  dextrose 50% Injectable 25 Gram(s) IV Push once  diltiazem    Tablet 60 milliGRAM(s) Oral three times a day  glucagon  Injectable 1 milliGRAM(s) IntraMuscular once  imipramine 50 milliGRAM(s) Oral daily  insulin glargine Injectable (LANTUS) 6 Unit(s) SubCutaneous at bedtime  insulin lispro (ADMELOG) corrective regimen sliding scale   SubCutaneous three times a day before meals  insulin lispro (ADMELOG) corrective regimen sliding scale   SubCutaneous at bedtime  lidocaine   4% Patch 1 Patch Transdermal daily  meropenem  IVPB 500 milliGRAM(s) IV Intermittent every 24 hours  metoprolol tartrate 100 milliGRAM(s) Oral two times a day  mirtazapine 7.5 milliGRAM(s) Oral at bedtime  pantoprazole    Tablet 40 milliGRAM(s) Oral before breakfast  polyethylene glycol 3350 17 Gram(s) Oral daily  senna 2 Tablet(s) Oral at bedtime    MEDICATIONS  (PRN):  acetaminophen     Tablet .. 650 milliGRAM(s) Oral every 6 hours PRN Temp greater or equal to 38C (100.4F), Mild Pain (1 - 3)  dextrose Oral Gel 15 Gram(s) Oral once PRN Blood Glucose LESS THAN 70 milliGRAM(s)/deciliter  midodrine 10 milliGRAM(s) Oral <User Schedule> PRN b/p  morphine  - Injectable 2 milliGRAM(s) IV Push every 6 hours PRN Severe Pain (7 - 10)  traMADol 25 milliGRAM(s) Oral every 6 hours PRN Moderate Pain (4 - 6)  traMADol 50 milliGRAM(s) Oral every 8 hours PRN Severe Pain (7 - 10)        T(C): 36.2 (07-08-23 @ 20:41), Max: 37.4 (07-08-23 @ 05:14)  HR: 89 (07-08-23 @ 20:41) (59 - 95)  BP: 100/65 (07-08-23 @ 20:41) (99/70 - 157/76)  RR: 17 (07-08-23 @ 20:41) (16 - 17)  SpO2: 98% (07-08-23 @ 20:41) (93% - 98%)  Wt(kg): --    PHYSICAL EXAM:  GENERAL: NAD, well-groomed, well-developed  HEAD:  Atraumatic, Normocephalic  NECK: Supple, No JVD, Normal thyroid  CHEST/LUNG: Clear to percussion bilaterally; No rales, rhonchi, wheezing, or rubs  HEART: Regular rate and rhythm; No murmurs, rubs, or gallops  ABDOMEN: Soft, Nontender, Nondistended; Bowel sounds present  EXTREMITIES:  2+ Peripheral Pulses, No clubbing, cyanosis, or edema  SKIN: No rashes or lesions    CAPILLARY BLOOD GLUCOSE      POCT Blood Glucose.: 301 mg/dL (08 Jul 2023 18:36)  POCT Blood Glucose.: 142 mg/dL (08 Jul 2023 15:43)  POCT Blood Glucose.: 366 mg/dL (08 Jul 2023 12:05)  POCT Blood Glucose.: 395 mg/dL (08 Jul 2023 07:57)  POCT Blood Glucose.: 276 mg/dL (07 Jul 2023 21:55)                            12.3   15.89 )-----------( 442      ( 07 Jul 2023 07:27 )             37.5       CMP:  07-07 @ 07:27  SGPT --  Albumin --   Alk Phos --   Anion Gap 14   SGOT --   Total Bili --   BUN 61   Calcium Total 10.2   CO2 23   Chloride 94   Creatinine 5.30   eGFR if AA --   eGFR if non AA --   Glucose 294   Potassium 4.6   Protein --   Sodium 131      Thyroid Function Tests:      Diabetes Tests:       Radiology:

## 2023-07-08 NOTE — PROGRESS NOTE ADULT - ASSESSMENT
72-year-old female with significant past medical history for hypertension, diabetes, dementia, end-stage renal disease on hemodialysis presents to the ED status post unwitnessed fall from [facility].  Patient states that she heard some voices then rolled out of bed.  Patient complaining of right hip pain.  Unknown head trauma.  + Eliquis < from: Xray Femur showed  Fractures including the medial wall of the acetabulum and inferior right pubic ramus Admitted for pain management ,PT/OT

## 2023-07-08 NOTE — PROGRESS NOTE ADULT - SUBJECTIVE AND OBJECTIVE BOX
[facility]   INFECTIOUS DISEASES   [address] [place] [address]  Tel: [phone]     Fax: [phone]  =========================================================  MD Zita Alejandra Kaushal, MD Cho, Michelle, MD Sunjit, Jaspal, MD  =========================================================    SHILO KOHLER [id number]    Follow up: UTI     Pt seen during HD, lying in bed, looks comfortable.   No fever. NAD.     Allergies:  No Known Drug Allergies  latex (Hives)      Antibiotics:  acetaminophen     Tablet .. 650 milliGRAM(s) Oral every 6 hours PRN  apixaban 2.5 milliGRAM(s) Oral two times a day  aspirin enteric coated 81 milliGRAM(s) Oral daily  cloNIDine 0.1 milliGRAM(s) Oral two times a day  dextrose 5%. 1000 milliLiter(s) IV Continuous <Continuous>  dextrose 5%. 1000 milliLiter(s) IV Continuous <Continuous>  dextrose 50% Injectable 25 Gram(s) IV Push once  dextrose 50% Injectable 25 Gram(s) IV Push once  dextrose 50% Injectable 12.5 Gram(s) IV Push once  dextrose Oral Gel 15 Gram(s) Oral once PRN  diltiazem    Tablet 60 milliGRAM(s) Oral three times a day  dronabinol 2.5 milliGRAM(s) Oral two times a day before meals  glucagon  Injectable 1 milliGRAM(s) IntraMuscular once  imipramine 50 milliGRAM(s) Oral daily  insulin glargine Injectable (LANTUS) 6 Unit(s) SubCutaneous at bedtime  insulin lispro (ADMELOG) corrective regimen sliding scale   SubCutaneous three times a day before meals  insulin lispro (ADMELOG) corrective regimen sliding scale   SubCutaneous at bedtime  lidocaine   4% Patch 1 Patch Transdermal daily  meropenem  IVPB 500 milliGRAM(s) IV Intermittent every 24 hours  metoprolol tartrate 100 milliGRAM(s) Oral two times a day  midodrine 10 milliGRAM(s) Oral <User Schedule> PRN  mirtazapine 7.5 milliGRAM(s) Oral at bedtime  morphine  - Injectable 2 milliGRAM(s) IV Push every 6 hours PRN  pantoprazole    Tablet 40 milliGRAM(s) Oral before breakfast  polyethylene glycol 3350 17 Gram(s) Oral daily  senna 2 Tablet(s) Oral at bedtime  traMADol 25 milliGRAM(s) Oral every 6 hours PRN  traMADol 50 milliGRAM(s) Oral every 8 hours PRN       REVIEW OF SYSTEMS:  CONSTITUTIONAL:  No Fever or chills  HEENT:   No diplopia or blurred vision.  No earache, sore throat or runny nose.  CARDIOVASCULAR:  No chest pain or SOB  RESPIRATORY:  No cough, shortness of breath, PND or orthopnea.  GASTROINTESTINAL:  No nausea, vomiting or diarrhea.  GENITOURINARY:  No dysuria, frequency or urgency. No Blood in urine  MUSCULOSKELETAL:  no joint aches, no muscle pain  SKIN:  No change in skin, hair or nails.  NEUROLOGIC:  No paresthesias or weakness.  PSYCHIATRIC:  No disorder of thought or mood.  ENDOCRINE:  No heat or cold intolerance, polyuria or polydipsia.  HEMATOLOGICAL:  No easy bruising or bleeding.      Physical Exam:  ICU Vital Signs Last 24 Hrs  T(C): 36.7 (08 Jul 2023 12:52), Max: 37.4 (08 Jul 2023 05:14)  T(F): 98.1 (08 Jul 2023 12:52), Max: 99.3 (08 Jul 2023 05:14)  HR: 88 (08 Jul 2023 12:52) (77 - 95)  BP: 131/77 (08 Jul 2023 12:52) (105/66 - 157/76)  BP(mean): --  ABP: --  ABP(mean): --  RR: 16 (08 Jul 2023 12:52) (16 - 16)  SpO2: 93% (08 Jul 2023 12:52) (93% - 94%)  O2 Parameters below as of 08 Jul 2023 12:52  Patient On (Oxygen Delivery Method): room air  GEN: NAD  HEENT: normocephalic and atraumatic. EOMI. PERICO.    NECK: Supple.  No lymphadenopathy   LUNGS: Clear to auscultation.  HEART: Regular rate and rhythm without murmur.  ABDOMEN: Soft, nontender, and nondistended.  Positive bowel sounds.    : No CVA tenderness  EXTREMITIES: Without edema.  MSK: no joint swelling  NEUROLOGIC: grossly intact.  PSYCHIATRIC: Appropriate affect .  SKIN: No rash     Labs:  07-07    131<L>  |  94<L>  |  61<H>  ----------------------------<  294<H>  4.6   |  23  |  5.30<H>    Ca    10.2<H>      07 Jul 2023 07:27                        12.3   15.89 )-----------( 442      ( 07 Jul 2023 07:27 )             37.5     Urinalysis Basic - ( 07 Jul 2023 07:27 )    Color: x / Appearance: x / SG: x / pH: x  Gluc: 294 mg/dL / Ketone: x  / Bili: x / Urobili: x   Blood: x / Protein: x / Nitrite: x   Leuk Esterase: x / RBC: x / WBC x   Sq Epi: x / Non Sq Epi: x / Bacteria: x    RECENT CULTURES:  07-03 @ 21:20 Clean Catch Clean Catch (Midstream) Pseudomonas aeruginosa (Carbapenem Resistant)  Klebsiella pneumoniae ESBL    >100,000 CFU/ml Pseudomonas aeruginosa (Carbapenem Resistant)  50,000 - 99,000 CFU/mL Klebsiella pneumoniae ESBL    07-03 @ 16:10 .Blood Blood     No growth at 4 days    07-03 @ 16:05 .Blood Blood     No growth at 4 days    All imaging and data are reviewed.     Assessment and Plan:   71YO F PMH hypertension, diabetes, dementia, end-stage renal disease on hemodialysis, who presented status post unwitnessed fall from [facility]- rolled out of bed. Patient complained of right hip pain--found to have fractures including the medial wall of the acetabulum and inferior right pubic ramus.    Patient found to have leukocytosis, likely reactive 2/2 hip fracture, although started on empiric antibiotics on 7/4 given new fever. CT chest and A/P showed known L pleural effusion but no other evidence of infection.  WBC 15 today, but otherwise remains afebrile. Blood remain currently no growth. Urine culture growing Low CFU CRE pseudomonas and >100,000 ESBL klebsiella--although concern that these represent colonization in this instance in the absence of pyuria and in patient with ESRD.   Has been on cefepime for few days but due to ESBL will switch to meropenem to cover for possible true UTI.     #Leukocytosis  #Positive urine culture     - Stop Cefepime  - Start 3 days of meropenem for now 500mg daily   - Follow cultures to completion  - Contact isolation for CRE   - Aspiration precautions  - Wound care  - Monitor WBC    Will follow.     Geovany Long MD  Division of Infectious Diseases   Please call ID service at [phone] with any question.      35 minutes spent on total encounter assessing patient, examination, chart review, counseling and coordinating care by the attending physician/nurse/care manager.

## 2023-07-08 NOTE — PROGRESS NOTE ADULT - SUBJECTIVE AND OBJECTIVE BOX
Patient is a 72y Female whom presented to the hospital with esrd on hd     PAST MEDICAL & SURGICAL HISTORY:  HTN (hypertension)      h/o Anxiety attack      Depression      h/o Myocardial infarct 2007      h/o Hepatitis A 1969  currently resolved, no symptoms      Murmur, cardiac      h/o Smoking  quitted 3/2012      Anemia secondary to renal failure      ESRD on dialysis      Falls      Ataxia      Type 2 diabetes mellitus      Peripheral vascular disease, unspecified      CAD (coronary artery disease)      Diabetes      coronary stent 2007      s/p Ovarian cyst removal      s/p surgical removal of benign Skin lesion epigastric area      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 650 milliGRAM(s) Oral every 6 hours  aspirin enteric coated 81 milliGRAM(s) Oral daily  cloNIDine 0.1 milliGRAM(s) Oral two times a day  dextrose 5%. 1000 milliLiter(s) (50 mL/Hr) IV Continuous <Continuous>  dextrose 5%. 1000 milliLiter(s) (100 mL/Hr) IV Continuous <Continuous>  dextrose 50% Injectable 25 Gram(s) IV Push once  dextrose 50% Injectable 12.5 Gram(s) IV Push once  dextrose 50% Injectable 25 Gram(s) IV Push once  diltiazem    Tablet 60 milliGRAM(s) Oral three times a day  dronabinol 2.5 milliGRAM(s) Oral two times a day before meals  glucagon  Injectable 1 milliGRAM(s) IntraMuscular once  imipramine 50 milliGRAM(s) Oral daily  insulin lispro (ADMELOG) corrective regimen sliding scale   SubCutaneous three times a day before meals  metoprolol tartrate 100 milliGRAM(s) Oral two times a day  mirtazapine 7.5 milliGRAM(s) Oral at bedtime  multivitamin 1 Tablet(s) Oral daily  pantoprazole    Tablet 40 milliGRAM(s) Oral before breakfast  senna 2 Tablet(s) Oral at bedtime  sodium chloride 0.45%. 1000 milliLiter(s) (50 mL/Hr) IV Continuous <Continuous>      Allergies    No Known Drug Allergies  latex (Hives)    Intolerances                                  SOCIAL HISTORY:  Denies ETOh,Smoking,     FAMILY HISTORY:  FH: myocardial infarction (Father)    FH: myocardial infarction (Father)    Family history of type 2 diabetes mellitus in brother (Sibling)        REVIEW OF SYSTEMS:    unable to obtained a good review system                            12.3   15.89 )-----------( 442      ( 07 Jul 2023 07:27 )             37.5       CBC Full  -  ( 07 Jul 2023 07:27 )  WBC Count : 15.89 K/uL  RBC Count : 3.90 M/uL  Hemoglobin : 12.3 g/dL  Hematocrit : 37.5 %  Platelet Count - Automated : 442 K/uL  Mean Cell Volume : 96.2 fl  Mean Cell Hemoglobin : 31.5 pg  Mean Cell Hemoglobin Concentration : 32.8 gm/dL  Auto Neutrophil # : x  Auto Lymphocyte # : x  Auto Monocyte # : x  Auto Eosinophil # : x  Auto Basophil # : x  Auto Neutrophil % : x  Auto Lymphocyte % : x  Auto Monocyte % : x  Auto Eosinophil % : x  Auto Basophil % : x      07-07    131<L>  |  94<L>  |  61<H>  ----------------------------<  294<H>  4.6   |  23  |  5.30<H>    Ca    10.2<H>      07 Jul 2023 07:27        CAPILLARY BLOOD GLUCOSE      POCT Blood Glucose.: 395 mg/dL (08 Jul 2023 07:57)  POCT Blood Glucose.: 276 mg/dL (07 Jul 2023 21:55)  POCT Blood Glucose.: 287 mg/dL (07 Jul 2023 17:19)  POCT Blood Glucose.: 440 mg/dL (07 Jul 2023 12:23)      Vital Signs Last 24 Hrs  T(C): 37.4 (08 Jul 2023 05:14), Max: 37.4 (08 Jul 2023 05:14)  T(F): 99.3 (08 Jul 2023 05:14), Max: 99.3 (08 Jul 2023 05:14)  HR: 95 (08 Jul 2023 05:14) (77 - 95)  BP: 157/76 (08 Jul 2023 05:14) (105/66 - 157/76)  BP(mean): --  RR: 16 (08 Jul 2023 05:14) (16 - 16)  SpO2: 94% (08 Jul 2023 05:14) (94% - 94%)    Parameters below as of 08 Jul 2023 05:14  Patient On (Oxygen Delivery Method): room air        Urinalysis Basic - ( 07 Jul 2023 07:27 )    Color: x / Appearance: x / SG: x / pH: x  Gluc: 294 mg/dL / Ketone: x  / Bili: x / Urobili: x   Blood: x / Protein: x / Nitrite: x   Leuk Esterase: x / RBC: x / WBC x   Sq Epi: x / Non Sq Epi: x / Bacteria: x                            PHYSICAL EXAM:    Constitutional: NAD  HEENT: conjunctive   clear   Neck:  No JVD  Respiratory: CTAB  Cardiovascular: S1 and S2  Gastrointestinal: BS+, soft, NT/ND  Extremities: No peripheral edema  Neurological:  no focal deficits  pos fistula

## 2023-07-09 LAB
ANION GAP SERPL CALC-SCNC: 14 MMOL/L — SIGNIFICANT CHANGE UP (ref 5–17)
BUN SERPL-MCNC: 91 MG/DL — HIGH (ref 7–23)
CALCIUM SERPL-MCNC: 10.7 MG/DL — HIGH (ref 8.5–10.1)
CHLORIDE SERPL-SCNC: 93 MMOL/L — LOW (ref 96–108)
CO2 SERPL-SCNC: 26 MMOL/L — SIGNIFICANT CHANGE UP (ref 22–31)
CREAT SERPL-MCNC: 6.4 MG/DL — HIGH (ref 0.5–1.3)
CULTURE RESULTS: SIGNIFICANT CHANGE UP
CULTURE RESULTS: SIGNIFICANT CHANGE UP
EGFR: 6 ML/MIN/1.73M2 — LOW
GLUCOSE SERPL-MCNC: 327 MG/DL — HIGH (ref 70–99)
HCT VFR BLD CALC: 37.9 % — SIGNIFICANT CHANGE UP (ref 34.5–45)
HGB BLD-MCNC: 12.3 G/DL — SIGNIFICANT CHANGE UP (ref 11.5–15.5)
MAGNESIUM SERPL-MCNC: 2.8 MG/DL — HIGH (ref 1.6–2.6)
MCHC RBC-ENTMCNC: 31.1 PG — SIGNIFICANT CHANGE UP (ref 27–34)
MCHC RBC-ENTMCNC: 32.5 GM/DL — SIGNIFICANT CHANGE UP (ref 32–36)
MCV RBC AUTO: 95.9 FL — SIGNIFICANT CHANGE UP (ref 80–100)
NRBC # BLD: 0 /100 WBCS — SIGNIFICANT CHANGE UP (ref 0–0)
PLATELET # BLD AUTO: 461 K/UL — HIGH (ref 150–400)
POTASSIUM SERPL-MCNC: 5.7 MMOL/L — HIGH (ref 3.5–5.3)
POTASSIUM SERPL-SCNC: 5.7 MMOL/L — HIGH (ref 3.5–5.3)
RBC # BLD: 3.95 M/UL — SIGNIFICANT CHANGE UP (ref 3.8–5.2)
RBC # FLD: 16.3 % — HIGH (ref 10.3–14.5)
SODIUM SERPL-SCNC: 133 MMOL/L — LOW (ref 135–145)
SPECIMEN SOURCE: SIGNIFICANT CHANGE UP
SPECIMEN SOURCE: SIGNIFICANT CHANGE UP
WBC # BLD: 18.01 K/UL — HIGH (ref 3.8–10.5)
WBC # FLD AUTO: 18.01 K/UL — HIGH (ref 3.8–10.5)

## 2023-07-09 PROCEDURE — 99233 SBSQ HOSP IP/OBS HIGH 50: CPT

## 2023-07-09 PROCEDURE — 99232 SBSQ HOSP IP/OBS MODERATE 35: CPT

## 2023-07-09 RX ORDER — ACETAMINOPHEN 500 MG
1000 TABLET ORAL ONCE
Refills: 0 | Status: COMPLETED | OUTPATIENT
Start: 2023-07-09 | End: 2023-07-09

## 2023-07-09 RX ORDER — SODIUM ZIRCONIUM CYCLOSILICATE 10 G/10G
5 POWDER, FOR SUSPENSION ORAL DAILY
Refills: 0 | Status: DISCONTINUED | OUTPATIENT
Start: 2023-07-09 | End: 2023-07-17

## 2023-07-09 RX ADMIN — Medication 81 MILLIGRAM(S): at 13:18

## 2023-07-09 RX ADMIN — INSULIN GLARGINE 8 UNIT(S): 100 INJECTION, SOLUTION SUBCUTANEOUS at 22:30

## 2023-07-09 RX ADMIN — LIDOCAINE 1 PATCH: 4 CREAM TOPICAL at 12:36

## 2023-07-09 RX ADMIN — APIXABAN 2.5 MILLIGRAM(S): 2.5 TABLET, FILM COATED ORAL at 05:21

## 2023-07-09 RX ADMIN — Medication 60 MILLIGRAM(S): at 22:29

## 2023-07-09 RX ADMIN — APIXABAN 2.5 MILLIGRAM(S): 2.5 TABLET, FILM COATED ORAL at 17:28

## 2023-07-09 RX ADMIN — MIRTAZAPINE 7.5 MILLIGRAM(S): 45 TABLET, ORALLY DISINTEGRATING ORAL at 22:29

## 2023-07-09 RX ADMIN — Medication 4: at 17:35

## 2023-07-09 RX ADMIN — SENNA PLUS 2 TABLET(S): 8.6 TABLET ORAL at 22:29

## 2023-07-09 RX ADMIN — SODIUM ZIRCONIUM CYCLOSILICATE 5 GRAM(S): 10 POWDER, FOR SUSPENSION ORAL at 11:21

## 2023-07-09 RX ADMIN — Medication 400 MILLIGRAM(S): at 01:59

## 2023-07-09 RX ADMIN — Medication 50 MILLIGRAM(S): at 14:10

## 2023-07-09 RX ADMIN — Medication 8: at 08:40

## 2023-07-09 RX ADMIN — Medication 0.1 MILLIGRAM(S): at 05:21

## 2023-07-09 RX ADMIN — PANTOPRAZOLE SODIUM 40 MILLIGRAM(S): 20 TABLET, DELAYED RELEASE ORAL at 05:21

## 2023-07-09 RX ADMIN — Medication 100 MILLIGRAM(S): at 17:29

## 2023-07-09 RX ADMIN — Medication 0.1 MILLIGRAM(S): at 17:29

## 2023-07-09 RX ADMIN — Medication 60 MILLIGRAM(S): at 13:18

## 2023-07-09 RX ADMIN — LIDOCAINE 1 PATCH: 4 CREAM TOPICAL at 22:21

## 2023-07-09 RX ADMIN — MEROPENEM 100 MILLIGRAM(S): 1 INJECTION INTRAVENOUS at 18:40

## 2023-07-09 RX ADMIN — Medication 60 MILLIGRAM(S): at 05:21

## 2023-07-09 RX ADMIN — Medication 6: at 12:36

## 2023-07-09 RX ADMIN — Medication 1000 MILLIGRAM(S): at 02:29

## 2023-07-09 RX ADMIN — Medication 100 MILLIGRAM(S): at 05:21

## 2023-07-09 NOTE — PROGRESS NOTE ADULT - SUBJECTIVE AND OBJECTIVE BOX
Patient is a 72y Female with a known history of :  Hip fracture [S72.009A]    Closed pelvic fracture [S32.9XXA]    Pubic ramus fracture [S32.599A]    Right acetabular fracture [S32.401A]    ESRD on dialysis [N18.6]    Prophylactic measure [Z29.9]    HTN (hypertension) [I10]    Fall [W19.XXXA]    Pleural effusion [J90]    Acute febrile illness [R50.9]    Type 2 diabetes mellitus [E11.9]      HPI:  72-year-old female with significant past medical history for hypertension, diabetes, dementia, end-stage renal disease on hemodialysis presents to the ED status post unwitnessed fall from [facility].  Patient states that she heard some voices then rolled out of bed.  Patient complaining of right hip pain.  Unknown head trauma.  + Eliquis < from: Xray Femur showed  Fractures including the medial wall of the acetabulum and inferior right pubic ramus Admitted for pain management ,PT/OT        (01 Jul 2023 15:24)      REVIEW OF SYSTEMS:    CONSTITUTIONAL: No fever, weight loss, or fatigue  EYES: No eye pain, visual disturbances, or discharge  ENMT:  No difficulty hearing, tinnitus, vertigo; No sinus or throat pain  NECK: No pain or stiffness  BREASTS: No pain, masses, or nipple discharge  RESPIRATORY: No cough, wheezing, chills or hemoptysis; No shortness of breath  CARDIOVASCULAR: No chest pain, palpitations, dizziness, or leg swelling  GASTROINTESTINAL: No abdominal or epigastric pain. No nausea, vomiting, or hematemesis; No diarrhea or constipation. No melena or hematochezia.  GENITOURINARY: No dysuria, frequency, hematuria, or incontinence  NEUROLOGICAL: No headaches, memory loss, loss of strength, numbness, or tremors  SKIN: No itching, burning, rashes, or lesions   LYMPH NODES: No enlarged glands  ENDOCRINE: No heat or cold intolerance; No hair loss  MUSCULOSKELETAL: No joint pain or swelling; No muscle, back, or extremity pain  PSYCHIATRIC: No depression, anxiety, mood swings, or difficulty sleeping  HEME/LYMPH: No easy bruising, or bleeding gums  ALLERGY AND IMMUNOLOGIC: No hives or eczema    MEDICATIONS  (STANDING):  apixaban 2.5 milliGRAM(s) Oral two times a day  aspirin enteric coated 81 milliGRAM(s) Oral daily  cloNIDine 0.1 milliGRAM(s) Oral two times a day  dextrose 5%. 1000 milliLiter(s) (100 mL/Hr) IV Continuous <Continuous>  dextrose 5%. 1000 milliLiter(s) (50 mL/Hr) IV Continuous <Continuous>  dextrose 50% Injectable 12.5 Gram(s) IV Push once  dextrose 50% Injectable 25 Gram(s) IV Push once  dextrose 50% Injectable 25 Gram(s) IV Push once  diltiazem    Tablet 60 milliGRAM(s) Oral three times a day  glucagon  Injectable 1 milliGRAM(s) IntraMuscular once  imipramine 50 milliGRAM(s) Oral daily  insulin glargine Injectable (LANTUS) 8 Unit(s) SubCutaneous at bedtime  insulin lispro (ADMELOG) corrective regimen sliding scale   SubCutaneous three times a day before meals  insulin lispro (ADMELOG) corrective regimen sliding scale   SubCutaneous at bedtime  lidocaine   4% Patch 1 Patch Transdermal daily  meropenem  IVPB 500 milliGRAM(s) IV Intermittent every 24 hours  metoprolol tartrate 100 milliGRAM(s) Oral two times a day  mirtazapine 7.5 milliGRAM(s) Oral at bedtime  pantoprazole    Tablet 40 milliGRAM(s) Oral before breakfast  polyethylene glycol 3350 17 Gram(s) Oral daily  senna 2 Tablet(s) Oral at bedtime    MEDICATIONS  (PRN):  acetaminophen     Tablet .. 650 milliGRAM(s) Oral every 6 hours PRN Temp greater or equal to 38C (100.4F), Mild Pain (1 - 3)  dextrose Oral Gel 15 Gram(s) Oral once PRN Blood Glucose LESS THAN 70 milliGRAM(s)/deciliter  midodrine 10 milliGRAM(s) Oral <User Schedule> PRN b/p      ALLERGIES: No Known Drug Allergies  latex (Hives)      FAMILY HISTORY:  FH: myocardial infarction (Father)    FH: myocardial infarction (Father)    Family history of type 2 diabetes mellitus in brother (Sibling)        PHYSICAL EXAMINATION:  -----------------------------  T(C): 36.8 (07-09-23 @ 05:06), Max: 36.8 (07-09-23 @ 05:06)  HR: 101 (07-09-23 @ 05:06) (59 - 101)  BP: 163/61 (07-09-23 @ 05:06) (99/70 - 163/61)  RR: 17 (07-09-23 @ 05:06) (16 - 17)  SpO2: 96% (07-09-23 @ 05:06) (93% - 98%)  Wt(kg): --    07-08 @ 07:01  -  07-09 @ 07:00  --------------------------------------------------------  IN:  Total IN: 0 mL    OUT:    Other (mL): 1000 mL  Total OUT: 1000 mL    Total NET: -1000 mL            VITALS  T(C): 36.8 (07-09-23 @ 05:06), Max: 36.8 (07-09-23 @ 05:06)  HR: 101 (07-09-23 @ 05:06) (59 - 101)  BP: 163/61 (07-09-23 @ 05:06) (99/70 - 163/61)  RR: 17 (07-09-23 @ 05:06) (16 - 17)  SpO2: 96% (07-09-23 @ 05:06) (93% - 98%)    Constitutional: well developed, normal appearance, well groomed, well nourished, no deformities and no acute distress.   Eyes: the conjunctiva exhibited no abnormalities and the eyelids demonstrated no xanthelasmas.   HEENT: normal oral mucosa, no oral pallor and no oral cyanosis.   Neck: normal jugular venous A waves present, normal jugular venous V waves present and no jugular venous wilson A waves.   Pulmonary: no respiratory distress, normal respiratory rhythm and effort, no accessory muscle use and lungs were clear to auscultation bilaterally.   Cardiovascular: heart rate and rhythm were normal, normal S1 and S2 and no murmur, gallop, rub, heave or thrill are present.   Abdomen: soft, non-tender, no hepato-splenomegaly and no abdominal mass palpated.   Musculoskeletal: the gait could not be assessed..   Extremities: no clubbing of the fingernails, no localized cyanosis, no petechial hemorrhages and no ischemic changes.   Skin: normal skin color and pigmentation, no rash, no venous stasis, no skin lesions, no skin ulcer and no xanthoma was observed.   Psychiatric: oriented to person, place, and time, the affect was normal, the mood was normal and not feeling anxious.     LABS:   --------  07-09    133<L>  |  93<L>  |  91<H>  ----------------------------<  327<H>  5.7<H>   |  26  |  6.40<H>    Ca    10.7<H>      09 Jul 2023 07:07  Mg     2.8     07-09                           12.3   18.01 )-----------( 461      ( 09 Jul 2023 07:07 )             37.9                 RADIOLOGY:  -----------------    ECG:     ECHO:

## 2023-07-09 NOTE — PROGRESS NOTE ADULT - SUBJECTIVE AND OBJECTIVE BOX
CHIEF COMPLAINT/ REASON FOR VISIT  .. Patient was seen to address the  issue listed under PROBLEM LIST which is located toward bottom of this note     SHILO KOHLER    PLSHARON 3WES [id number] D1    Allergies    No Known Drug Allergies  latex (Hives)    Intolerances        PAST MEDICAL & SURGICAL HISTORY:  HTN (hypertension)      h/o Anxiety attack      Depression      h/o Myocardial infarct 2007      h/o Hepatitis A 1969  currently resolved, no symptoms      Murmur, cardiac      h/o Smoking  quitted 3/2012      Anemia secondary to renal failure      ESRD on dialysis      Falls      Ataxia      Type 2 diabetes mellitus      Peripheral vascular disease, unspecified      CAD (coronary artery disease)      Diabetes      coronary stent 2007      s/p Ovarian cyst removal      s/p surgical removal of benign Skin lesion epigastric area      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          FAMILY HISTORY:  FH: myocardial infarction (Father)    FH: myocardial infarction (Father)    Family history of type 2 diabetes mellitus in brother (Sibling)        Home Medications:  acetaminophen 325 mg oral tablet: 2 tab(s) orally every 6 hours as needed (02 Jul 2023 15:24)  Admelog SoloStar 100 units/mL injectable solution: injectable 3 times a day (before meals)sliding scale  (02 Jul 2023 15:24)  apixaban 2.5 mg oral tablet: 1 tab(s) orally 2 times a day (02 Jul 2023 15:24)  aspirin 81 mg oral delayed release tablet: 1 tab(s) orally once a day (at bedtime) (02 Jul 2023 15:24)  atorvastatin 20 mg oral tablet: 1 tab(s) orally once a day (at bedtime) (02 Jul 2023 15:24)  bacitracin 500 units/g topical ointment: Apply topically to affected area once a day to right knee abrasion (02 Jul 2023 11:54)  Basaglar KwikPen 100 units/mL subcutaneous solution: 8 international unit(s) subcutaneous once a day (at bedtime) (02 Jul 2023 15:24)  cloNIDine 0.1 mg oral tablet: 1 tab(s) orally 2 times a day (02 Jul 2023 15:24)  DilTIAZem (Eqv-Cardizem CD) 180 mg/24 hours oral capsule, extended release: 1 cap(s) orally once a day (02 Jul 2023 15:24)  imipramine 50 mg oral tablet: 1 tab(s) orally once a day (in the evening) (02 Jul 2023 15:24)  metoprolol tartrate 100 mg oral tablet: 1 tab(s) orally 2 times a day (02 Jul 2023 15:24)  mirtazapine 7.5 mg oral tablet: 1 tab(s) orally once a day (at bedtime) (02 Jul 2023 15:24)  Nephro-Alfie oral tablet: 1 tab(s) orally once a day (02 Jul 2023 15:24)  PANTOPRAZOLE 40MG DR TAB: 1 tab(s) orally once a day (02 Jul 2023 15:24)  senna leaf extract oral tablet: 2 tab(s) orally once a day (at bedtime) (02 Jul 2023 15:24)      MEDICATIONS  (STANDING):  apixaban 2.5 milliGRAM(s) Oral two times a day  aspirin enteric coated 81 milliGRAM(s) Oral daily  cloNIDine 0.1 milliGRAM(s) Oral two times a day  dextrose 5%. 1000 milliLiter(s) (100 mL/Hr) IV Continuous <Continuous>  dextrose 5%. 1000 milliLiter(s) (50 mL/Hr) IV Continuous <Continuous>  dextrose 50% Injectable 12.5 Gram(s) IV Push once  dextrose 50% Injectable 25 Gram(s) IV Push once  dextrose 50% Injectable 25 Gram(s) IV Push once  diltiazem    Tablet 60 milliGRAM(s) Oral three times a day  glucagon  Injectable 1 milliGRAM(s) IntraMuscular once  imipramine 50 milliGRAM(s) Oral daily  insulin glargine Injectable (LANTUS) 8 Unit(s) SubCutaneous at bedtime  insulin lispro (ADMELOG) corrective regimen sliding scale   SubCutaneous three times a day before meals  insulin lispro (ADMELOG) corrective regimen sliding scale   SubCutaneous at bedtime  lidocaine   4% Patch 1 Patch Transdermal daily  meropenem  IVPB 500 milliGRAM(s) IV Intermittent every 24 hours  metoprolol tartrate 100 milliGRAM(s) Oral two times a day  mirtazapine 7.5 milliGRAM(s) Oral at bedtime  pantoprazole    Tablet 40 milliGRAM(s) Oral before breakfast  polyethylene glycol 3350 17 Gram(s) Oral daily  senna 2 Tablet(s) Oral at bedtime    MEDICATIONS  (PRN):  acetaminophen     Tablet .. 650 milliGRAM(s) Oral every 6 hours PRN Temp greater or equal to 38C (100.4F), Mild Pain (1 - 3)  dextrose Oral Gel 15 Gram(s) Oral once PRN Blood Glucose LESS THAN 70 milliGRAM(s)/deciliter  midodrine 10 milliGRAM(s) Oral <User Schedule> PRN b/p              Vital Signs Last 24 Hrs  T(C): 36.8 (09 Jul 2023 05:06), Max: 36.8 (09 Jul 2023 05:06)  T(F): 98.2 (09 Jul 2023 05:06), Max: 98.2 (09 Jul 2023 05:06)  HR: 101 (09 Jul 2023 05:06) (59 - 101)  BP: 163/61 (09 Jul 2023 05:06) (99/70 - 163/61)  BP(mean): --  RR: 17 (09 Jul 2023 05:06) (16 - 17)  SpO2: 96% (09 Jul 2023 05:06) (93% - 98%)    Parameters below as of 09 Jul 2023 05:06  Patient On (Oxygen Delivery Method): room air          07-08-23 @ 07:01  -  07-09-23 @ 07:00  --------------------------------------------------------  IN: 0 mL / OUT: 1000 mL / NET: -1000 mL              LABS:                        12.3   18.01 )-----------( 461      ( 09 Jul 2023 07:07 )             37.9       Mg     2.8     07-09                WBC:  WBC Count: 18.01 K/uL (07-09 @ 07:07)  WBC Count: 15.89 K/uL (07-07 @ 07:27)  WBC Count: 13.16 K/uL (07-06 @ 07:50)      MICROBIOLOGY:  RECENT CULTURES:  07-03 Clean Catch Clean Catch (Midstream) Pseudomonas aeruginosa (Carbapenem Resistant)  Klebsiella pneumoniae ESBL XXXX   >100,000 CFU/ml Pseudomonas aeruginosa (Carbapenem Resistant)  50,000 - 99,000 CFU/mL Klebsiella pneumoniae ESBL    07-03 .Blood Blood XXXX XXXX   No growth at 5 days    07-03 .Blood Blood XXXX XXXX   No growth at 5 days                    Sodium:  Sodium: 131 mmol/L (07-07 @ 07:27)  Sodium: 131 mmol/L (07-06 @ 07:50)      5.30 mg/dL 07-07 @ 07:27  6.70 mg/dL 07-06 @ 07:50      Hemoglobin:  Hemoglobin: 12.3 g/dL (07-09 @ 07:07)  Hemoglobin: 12.3 g/dL (07-07 @ 07:27)  Hemoglobin: 11.1 g/dL (07-06 @ 07:50)      Platelets: Platelet Count - Automated: 461 K/uL (07-09 @ 07:07)  Platelet Count - Automated: 442 K/uL (07-07 @ 07:27)  Platelet Count - Automated: 364 K/uL (07-06 @ 07:50)              RADIOLOGY & ADDITIONAL STUDIES:      MICROBIOLOGY:  RECENT CULTURES:  07-03 Clean Catch Clean Catch (Midstream) Pseudomonas aeruginosa (Carbapenem Resistant)  Klebsiella pneumoniae ESBL XXXX   >100,000 CFU/ml Pseudomonas aeruginosa (Carbapenem Resistant)  50,000 - 99,000 CFU/mL Klebsiella pneumoniae ESBL    07-03 .Blood Blood XXXX XXXX   No growth at 5 days    07-03 .Blood Blood XXXX XXXX   No growth at 5 days

## 2023-07-09 NOTE — PROGRESS NOTE ADULT - SUBJECTIVE AND OBJECTIVE BOX
Date of Service: 07-09-23 @ 13:52    Patient is a 72y old  Female who presents with a chief complaint of s/p fall (09 Jul 2023 09:59)      INTERVAL HPI/OVERNIGHT EVENTS: Patient seen and examined. NAD. No complaints.    Vital Signs Last 24 Hrs  T(C): 36.8 (09 Jul 2023 11:52), Max: 36.8 (09 Jul 2023 05:06)  T(F): 98.2 (09 Jul 2023 11:52), Max: 98.2 (09 Jul 2023 05:06)  HR: 98 (09 Jul 2023 13:10) (59 - 101)  BP: 165/78 (09 Jul 2023 13:10) (99/70 - 165/78)  BP(mean): --  RR: 17 (09 Jul 2023 11:52) (16 - 17)  SpO2: 92% (09 Jul 2023 11:52) (92% - 98%)    Parameters below as of 09 Jul 2023 11:52  Patient On (Oxygen Delivery Method): room air        07-09    133<L>  |  93<L>  |  91<H>  ----------------------------<  327<H>  5.7<H>   |  26  |  6.40<H>    Ca    10.7<H>      09 Jul 2023 07:07  Mg     2.8     07-09                            12.3   18.01 )-----------( 461      ( 09 Jul 2023 07:07 )             37.9       CAPILLARY BLOOD GLUCOSE      POCT Blood Glucose.: 263 mg/dL (09 Jul 2023 12:19)  POCT Blood Glucose.: 312 mg/dL (09 Jul 2023 08:17)  POCT Blood Glucose.: 317 mg/dL (08 Jul 2023 21:33)  POCT Blood Glucose.: 301 mg/dL (08 Jul 2023 18:36)  POCT Blood Glucose.: 142 mg/dL (08 Jul 2023 15:43)    Urinalysis Basic - ( 09 Jul 2023 07:07 )    Color: x / Appearance: x / SG: x / pH: x  Gluc: 327 mg/dL / Ketone: x  / Bili: x / Urobili: x   Blood: x / Protein: x / Nitrite: x   Leuk Esterase: x / RBC: x / WBC x   Sq Epi: x / Non Sq Epi: x / Bacteria: x              acetaminophen     Tablet .. 650 milliGRAM(s) Oral every 6 hours PRN  apixaban 2.5 milliGRAM(s) Oral two times a day  aspirin enteric coated 81 milliGRAM(s) Oral daily  cloNIDine 0.1 milliGRAM(s) Oral two times a day  dextrose 5%. 1000 milliLiter(s) IV Continuous <Continuous>  dextrose 5%. 1000 milliLiter(s) IV Continuous <Continuous>  dextrose 50% Injectable 12.5 Gram(s) IV Push once  dextrose 50% Injectable 25 Gram(s) IV Push once  dextrose 50% Injectable 25 Gram(s) IV Push once  dextrose Oral Gel 15 Gram(s) Oral once PRN  diltiazem    Tablet 60 milliGRAM(s) Oral three times a day  glucagon  Injectable 1 milliGRAM(s) IntraMuscular once  imipramine 50 milliGRAM(s) Oral daily  insulin glargine Injectable (LANTUS) 8 Unit(s) SubCutaneous at bedtime  insulin lispro (ADMELOG) corrective regimen sliding scale   SubCutaneous three times a day before meals  insulin lispro (ADMELOG) corrective regimen sliding scale   SubCutaneous at bedtime  lidocaine   4% Patch 1 Patch Transdermal daily  meropenem  IVPB 500 milliGRAM(s) IV Intermittent every 24 hours  metoprolol tartrate 100 milliGRAM(s) Oral two times a day  midodrine 10 milliGRAM(s) Oral <User Schedule> PRN  mirtazapine 7.5 milliGRAM(s) Oral at bedtime  pantoprazole    Tablet 40 milliGRAM(s) Oral before breakfast  polyethylene glycol 3350 17 Gram(s) Oral daily  senna 2 Tablet(s) Oral at bedtime  sodium zirconium cyclosilicate 5 Gram(s) Oral daily              REVIEW OF SYSTEMS:  CONSTITUTIONAL: No fever, no weight loss, or no fatigue  NECK: No pain, no stiffness  RESPIRATORY: No cough, no wheezing, no chills, no hemoptysis, No shortness of breath  CARDIOVASCULAR: No chest pain, no palpitations, no dizziness, no leg swelling  GASTROINTESTINAL: No abdominal pain. No nausea, no vomiting, no hematemesis; No diarrhea, no constipation. No melena, no hematochezia.  GENITOURINARY: No dysuria, no frequency, no hematuria, no incontinence  NEUROLOGICAL: No headaches, no loss of strength, no numbness, no tremors  SKIN: No itching, no burning  MUSCULOSKELETAL: No joint pain, no swelling; No muscle, no back, no extremity pain  PSYCHIATRIC: No depression, no mood swings,   HEME/LYMPH: No easy bruising, no bleeding gums  ALLERY AND IMMUNOLOGIC: No hives       Consultant(s) Notes Reviewed:  [X] YES  [ ] NO    PHYSICAL EXAM:  GENERAL: NAD  HEAD:  Atraumatic, Normocephalic  EYES: EOMI, PERRLA, conjunctiva and sclera clear  ENMT: No tonsillar erythema, exudates, or enlargement; Moist mucous membranes  NECK: Supple, No JVD  NERVOUS SYSTEM:  Awake & alert  CHEST/LUNG: Clear to auscultation bilaterally; No rales, rhonchi, wheezing,  HEART: Regular rate and rhythm  ABDOMEN: Soft, Nontender, Nondistended; Bowel sounds present  EXTREMITIES:  No clubbing, cyanosis, or edema  LYMPH: No lymphadenopathy noted  SKIN: No rashes      Advanced care planning discussed with patient/family [X] YES   [ ] NO    Advanced care planning discussed with patient/family. Patient's health status was discussed. All appropriate changes have been made regarding patient's end-of-life care. Advanced care planning forms reviewed/discussed/completed.  20 minutes spent.

## 2023-07-09 NOTE — PROGRESS NOTE ADULT - PROBLEM SELECTOR PLAN 6
pain management , pt /ot ,no WB RLE ,case d/w ortho team -ok to resume OAC , monitor h/h closely Pain management prn, PT/OT  NWB RLE  Case d/w ortho team -ok to resume OAC , monitor h/h closely

## 2023-07-09 NOTE — PROGRESS NOTE ADULT - ASSESSMENT
72-year-old female with significant past medical history for hypertension, diabetes, dementia, end-stage renal disease on hemodialysis presents to the ED status post unwitnessed fall from [facility].  Patient states that she heard some voices then rolled out of bed.  Patient complaining of right hip pain.  Unknown head trauma.  + Eliquis < from: Xray Femur showed  Fractures including the medial wall of the acetabulum and inferior right pubic ramus Admitted for pain management ,PT/OT

## 2023-07-09 NOTE — CHART NOTE - NSCHARTNOTEFT_GEN_A_CORE
Assessment: patient seen for malnutrition follow up  72y old  Female who presents with a chief complaint of s/p fall  HD patient   MOLST no tube feeding   7/9 BM  per RN patient with varying PO ate lunch well yesterday did not do as well with dinner      Factors impacting intake: [ ] none [ ] nausea  [ ] vomiting [ ] diarrhea [ ] constipation  [ ]chewing problems [x ] swallowing issues  [ ] other: minced and moist moderately thick per speech    Diet Prescription: minced and moist moderately thick renal consistent cho  nepro daily   Intake: up to 100% per flow sheet but varying    Current Weight: POST HD 7/8 wt 117#   no edema     Pertinent Medications: MEDICATIONS  (STANDING):  apixaban 2.5 milliGRAM(s) Oral two times a day  aspirin enteric coated 81 milliGRAM(s) Oral daily  cloNIDine 0.1 milliGRAM(s) Oral two times a day  dextrose 5%. 1000 milliLiter(s) (50 mL/Hr) IV Continuous <Continuous>  dextrose 5%. 1000 milliLiter(s) (100 mL/Hr) IV Continuous <Continuous>  dextrose 50% Injectable 12.5 Gram(s) IV Push once  dextrose 50% Injectable 25 Gram(s) IV Push once  dextrose 50% Injectable 25 Gram(s) IV Push once  diltiazem    Tablet 60 milliGRAM(s) Oral three times a day  glucagon  Injectable 1 milliGRAM(s) IntraMuscular once  imipramine 50 milliGRAM(s) Oral daily  insulin glargine Injectable (LANTUS) 8 Unit(s) SubCutaneous at bedtime  insulin lispro (ADMELOG) corrective regimen sliding scale   SubCutaneous three times a day before meals  insulin lispro (ADMELOG) corrective regimen sliding scale   SubCutaneous at bedtime  lidocaine   4% Patch 1 Patch Transdermal daily  meropenem  IVPB 500 milliGRAM(s) IV Intermittent every 24 hours  metoprolol tartrate 100 milliGRAM(s) Oral two times a day  mirtazapine 7.5 milliGRAM(s) Oral at bedtime  pantoprazole    Tablet 40 milliGRAM(s) Oral before breakfast  polyethylene glycol 3350 17 Gram(s) Oral daily  senna 2 Tablet(s) Oral at bedtime  sodium zirconium cyclosilicate 5 Gram(s) Oral daily    MEDICATIONS  (PRN):  acetaminophen     Tablet .. 650 milliGRAM(s) Oral every 6 hours PRN Temp greater or equal to 38C (100.4F), Mild Pain (1 - 3)  dextrose Oral Gel 15 Gram(s) Oral once PRN Blood Glucose LESS THAN 70 milliGRAM(s)/deciliter  midodrine 10 milliGRAM(s) Oral <User Schedule> PRN b/p    Pertinent Labs: POCT 312, 317 endocrine following on insulin regiment, K 5.7 on renal diet BUN 91 Creat 6.4   Skin: coccyx stage 1 per RN    Estimated Needs:   [x ] no change since previous assessment based on 53.3kg dry wt 30-35kcals/kg 1620-1885kcals and 1.2-1.4gms protein/kg 64-75gms protein   [ ] recalculated:     Previous Nutrition Diagnosis:   [ ] Inadequate Energy Intake [ ]Inadequate Oral Intake [ ] Excessive Energy Intake   [ ] Underweight [ x] Increased Nutrient Needs [ ] Overweight/Obesity   [ ] Altered GI Function [ ] Unintended Weight Loss [ ] Food & Nutrition Related Knowledge Deficit [x ] Malnutrition     Nutrition Diagnosis is [ x] ongoing  [ ] resolved [ ] not applicable     New Nutrition Diagnosis: [ x] not applicable       Interventions:   Recommend  [ ] Change Diet To:  [ ] Nutrition Supplement  [ ] Nutrition Support  [x ] Other: continue diet and supplement as ordered , trend blood sugars (100-180 in hospital setting)     Monitoring and Evaluation:   [x ] PO intake [ x ] Tolerance to diet prescription [ x ] weights [ x ] labs[ x ] follow up per protocol  [ ] other:

## 2023-07-09 NOTE — PROGRESS NOTE ADULT - ASSESSMENT
72-year-old female former smoker quit 3/2012  with significant past medical history for hypertension, CAD sp cabg  PVD  diabetes, dementia, end-stage renal disease on hemodialysis   a fib on eliquis sp recent hosp stay 5/17-5/24/2023  admitted 3/7-3/11/2023 and before that 2/22-2/25/2023   . During 5/2023 admission she had   . ENTERORHINOVIRUS INFECTION RESP TRACT 5/17  . PLEURAL EFFUSION SP l THORA 5/18 TRANSUDATE     . Patient now  presented to the ED 7/1/2023 status post unwitnessed fall from [facility].  Patient states that she heard some voices then rolled out of bed.  Patient complaining of right hip pain.  Unknown head trauma.  + [place]le[place]o     She was found to have acetabular fracture which Ortho was contacted and they plan non operative management Pt was admitted to [facility] for bleed as she was on apixaban and was noted to have pl effsn    . 7/1/2023  I was asked to admit pt                      (01 Jul 2023 13:32)      esrd on hd tues thurs sat     ANEMIA PLAN:  Anemia of chronic disease:  We will continue epo aiming for a HCT of 32-36 %.   We will monitor Iron stores, B12 and RBC folate .      BP monitoring,continue current antihypertensive meds, low salt diet,followup with PMD in 1-2 weeks      Accuchecks monitoring and insulin sliding scale coverage, no concentrated sweets diet, serial labs and f/up with PMD, monitor HB A 1 C every 3-4 mnth

## 2023-07-09 NOTE — PROGRESS NOTE ADULT - PROBLEM SELECTOR PLAN 4
Uncontrolled DM  Started on Lantus with RISS  Endocrine consult called Uncontrolled DM  Started on Lantus with RISS  Endocrine consult noted

## 2023-07-09 NOTE — PROGRESS NOTE ADULT - SUBJECTIVE AND OBJECTIVE BOX
[facility]   INFECTIOUS DISEASES   [address] [place] [address]  Tel: [phone]     Fax: [phone]  =========================================================  MD Zita Alejandra Kaushal, MD Cho, Michelle, MD Sunjit, Jaspal, MD  =========================================================    SHILO KOHLER [id number]    Follow up: UTI     Pt seen today, lying in bed, looks comfortable.   No fever. NAD.     Allergies:  No Known Drug Allergies  latex (Hives)    Antibiotics:  acetaminophen     Tablet .. 650 milliGRAM(s) Oral every 6 hours PRN  apixaban 2.5 milliGRAM(s) Oral two times a day  aspirin enteric coated 81 milliGRAM(s) Oral daily  cloNIDine 0.1 milliGRAM(s) Oral two times a day  dextrose 5%. 1000 milliLiter(s) IV Continuous <Continuous>  dextrose 5%. 1000 milliLiter(s) IV Continuous <Continuous>  dextrose 50% Injectable 25 Gram(s) IV Push once  dextrose 50% Injectable 25 Gram(s) IV Push once  dextrose 50% Injectable 12.5 Gram(s) IV Push once  dextrose Oral Gel 15 Gram(s) Oral once PRN  diltiazem    Tablet 60 milliGRAM(s) Oral three times a day  dronabinol 2.5 milliGRAM(s) Oral two times a day before meals  glucagon  Injectable 1 milliGRAM(s) IntraMuscular once  imipramine 50 milliGRAM(s) Oral daily  insulin glargine Injectable (LANTUS) 6 Unit(s) SubCutaneous at bedtime  insulin lispro (ADMELOG) corrective regimen sliding scale   SubCutaneous three times a day before meals  insulin lispro (ADMELOG) corrective regimen sliding scale   SubCutaneous at bedtime  lidocaine   4% Patch 1 Patch Transdermal daily  meropenem  IVPB 500 milliGRAM(s) IV Intermittent every 24 hours  metoprolol tartrate 100 milliGRAM(s) Oral two times a day  midodrine 10 milliGRAM(s) Oral <User Schedule> PRN  mirtazapine 7.5 milliGRAM(s) Oral at bedtime  morphine  - Injectable 2 milliGRAM(s) IV Push every 6 hours PRN  pantoprazole    Tablet 40 milliGRAM(s) Oral before breakfast  polyethylene glycol 3350 17 Gram(s) Oral daily  senna 2 Tablet(s) Oral at bedtime  traMADol 25 milliGRAM(s) Oral every 6 hours PRN  traMADol 50 milliGRAM(s) Oral every 8 hours PRN       REVIEW OF SYSTEMS:  CONSTITUTIONAL:  No Fever or chills  HEENT:   No diplopia or blurred vision.  No earache, sore throat or runny nose.  CARDIOVASCULAR:  No chest pain or SOB  RESPIRATORY:  No cough, shortness of breath, PND or orthopnea.  GASTROINTESTINAL:  No nausea, vomiting or diarrhea.  GENITOURINARY:  No dysuria, frequency or urgency. No Blood in urine  MUSCULOSKELETAL:  no joint aches, no muscle pain  SKIN:  No change in skin, hair or nails.  NEUROLOGIC:  No paresthesias or weakness.  PSYCHIATRIC:  No disorder of thought or mood.  ENDOCRINE:  No heat or cold intolerance, polyuria or polydipsia.  HEMATOLOGICAL:  No easy bruising or bleeding.      Physical Exam:  Vital Signs Last 24 Hrs  T(C): 36.8 (09 Jul 2023 11:52), Max: 36.8 (09 Jul 2023 05:06)  T(F): 98.2 (09 Jul 2023 11:52), Max: 98.2 (09 Jul 2023 05:06)  HR: 98 (09 Jul 2023 13:10) (59 - 101)  BP: 165/78 (09 Jul 2023 13:10) (99/70 - 165/78)  BP(mean): --  RR: 17 (09 Jul 2023 11:52) (16 - 17)  SpO2: 92% (09 Jul 2023 11:52) (92% - 98%)  Parameters below as of 09 Jul 2023 11:52  Patient On (Oxygen Delivery Method): room air  GEN: NAD  HEENT: normocephalic and atraumatic. EOMI. PERICO.    NECK: Supple.  No lymphadenopathy   LUNGS: Clear to auscultation.  HEART: Regular rate and rhythm without murmur.  ABDOMEN: Soft, nontender, and nondistended.  Positive bowel sounds.    : No CVA tenderness  EXTREMITIES: Without edema.  MSK: no joint swelling  NEUROLOGIC: grossly intact.  PSYCHIATRIC: Appropriate affect .  SKIN: No rash       Labs:                        12.3   18.01 )-----------( 461      ( 09 Jul 2023 07:07 )             37.9     07-09    133<L>  |  93<L>  |  91<H>  ----------------------------<  327<H>  5.7<H>   |  26  |  6.40<H>    Ca    10.7<H>      09 Jul 2023 07:07  Mg     2.8     07-09        Culture - Urine (collected 07-03-23 @ 21:20)  Source: Clean Catch Clean Catch (Midstream)  Final Report (07-07-23 @ 20:15):    >100,000 CFU/ml Pseudomonas aeruginosa (Carbapenem Resistant)    50,000 - 99,000 CFU/mL Klebsiella pneumoniae ESBL  Organism: Pseudomonas aeruginosa (Carbapenem Resistant)  Klebsiella pneumoniae ESBL (07-07-23 @ 20:15)  Organism: Klebsiella pneumoniae ESBL (07-07-23 @ 20:15)    Sensitivities:      Method Type: SONIA      -  Amikacin: S <=16      -  Amoxicillin/Clavulanic Acid: I 16/8      -  Ampicillin: R >16 These ampicillin results predict results for amoxicillin      -  Ampicillin/Sulbactam: R >16/8 Enterobacter, Klebsiella aerogenes, Citrobacter, and Serratia may develop resistance during prolonged therapy (3-4 days)      -  Aztreonam: R >16      -  Cefazolin: R >16 For uncomplicated UTI with K. pneumoniae, E. coli, or P. mirablis: SONIA <=16 is sensitive and SONIA >=32 is resistant. This also predicts results for oral agents cefaclor, cefdinir, cefpodoxime, cefprozil, cefuroxime axetil, cephalexin and locarbef for uncomplicated UTI. Note that some isolates may be susceptible to these agents while testing resistant to cefazolin.      -  Cefepime: R >16      -  Ceftriaxone: R >32 Enterobacter, Klebsiella aerogenes, Citrobacter, and Serratia may develop resistance during prolonged therapy      -  Cefuroxime: R >16      -  Ciprofloxacin: R >2      -  Ertapenem: S <=0.5      -  Gentamicin: R >8      -  Imipenem: S <=1      -  Levofloxacin: R >4      -  Meropenem: S <=1      -  Nitrofurantoin: R >64 Should not be used to treat pyelonephritis      -  Piperacillin/Tazobactam: R 64      -  Tobramycin: R >8      -  Trimethoprim/Sulfamethoxazole: R >2/38  Organism: Pseudomonas aeruginosa (Carbapenem Resistant) (07-07-23 @ 20:15)    Sensitivities:      Method Type: CarbaR      -  Resistance Gene to Carbapenem: Nondet  Organism: Pseudomonas aeruginosa (Carbapenem Resistant) (07-07-23 @ 20:15)    Sensitivities:      Method Type: SONIA      -  Amikacin: S <=16      -  Aztreonam: R >16      -  Cefepime: I 16      -  Ceftazidime: S 8      -  Ceftazidime/Avibactam: S 8      -  Ceftolozane/tazobactam: S <=2      -  Ciprofloxacin: R >2      -  Gentamicin: S <=2      -  Imipenem: R >8      -  Levofloxacin: R >4      -  Meropenem: R >8      -  Piperacillin/Tazobactam: S 16      -  Tobramycin: S <=2    Culture - Blood (collected 07-03-23 @ 16:10)  Source: .Blood Blood  Final Report (07-09-23 @ 01:01):    No growth at 5 days    Culture - Blood (collected 07-03-23 @ 16:05)  Source: .Blood Blood  Final Report (07-09-23 @ 01:01):    No growth at 5 days    WBC Count: 18.01 K/uL (07-09-23 @ 07:07)  WBC Count: 15.89 K/uL (07-07-23 @ 07:27)  WBC Count: 13.16 K/uL (07-06-23 @ 07:50)  WBC Count: 18.89 K/uL (07-05-23 @ 08:28)    Creatinine: 6.40 mg/dL (07-09-23 @ 07:07)  Creatinine: 5.30 mg/dL (07-07-23 @ 07:27)  Creatinine: 6.70 mg/dL (07-06-23 @ 07:50)  Creatinine: 5.20 mg/dL (07-05-23 @ 08:28)    Procalcitonin, Serum: 1.64 ng/mL (07-03-23 @ 07:30)     SARS-CoV-2 Result: NotDetec (07-04-23 @ 12:30)    All imaging and data are reviewed.   < from: CT Abdomen and Pelvis No Cont (07.07.23 @ 11:25) >  IMPRESSION:  Moderate left-sided pleural effusion with extensive partial atelectasis   left lower lobe.  No acute abdominal pathology, given limitations of noncontrast exam.  Comminuted right acetabular and pelvic fractures.  See separate report from CT scan pelvis 7/1/2023.  Other findings as discussed above.      Assessment and Plan:   71YO F PMH hypertension, diabetes, dementia, end-stage renal disease on hemodialysis, who presented status post unwitnessed fall from [facility]- rolled out of bed. Patient complained of right hip pain--found to have fractures including the medial wall of the acetabulum and inferior right pubic ramus.    Patient found to have leukocytosis, likely reactive 2/2 hip fracture, although started on empiric antibiotics on 7/4 given new fever. CT chest and A/P showed known L pleural effusion but no other evidence of infection.  WBC 18 today, but otherwise remains afebrile. Blood remain currently no growth. Urine culture growing Low CFU CRE pseudomonas and >100,000 ESBL klebsiella--although concern that these represent colonization in this instance in the absence of pyuria and in patient with ESRD.   Has been on cefepime for few days but due to ESBL will switch to meropenem to cover for possible true klebsiella UTI.     #Leukocytosis  #Positive urine culture     - Continue meropenem for now 500mg daily   - Follow cultures to completion  - Contact isolation for CRE   - Aspiration precautions  - Wound care  - Monitor WBC today slightly higher   - CT chest and abdomen from 7/7 just showed a moderate L effusion     Dr. Tsai will follow tomorrow.     Geovany Long MD  Division of Infectious Diseases   Please call ID service at [phone] with any question.      35 minutes spent on total encounter assessing patient, examination, chart review, counseling and coordinating care by the attending physician/nurse/care manager.

## 2023-07-09 NOTE — PROGRESS NOTE ADULT - SUBJECTIVE AND OBJECTIVE BOX
Patient is a 72y Female whom presented to the hospital with esrd on hd     PAST MEDICAL & SURGICAL HISTORY:  HTN (hypertension)      h/o Anxiety attack      Depression      h/o Myocardial infarct 2007      h/o Hepatitis A 1969  currently resolved, no symptoms      Murmur, cardiac      h/o Smoking  quitted 3/2012      Anemia secondary to renal failure      ESRD on dialysis      Falls      Ataxia      Type 2 diabetes mellitus      Peripheral vascular disease, unspecified      CAD (coronary artery disease)      Diabetes      coronary stent 2007      s/p Ovarian cyst removal      s/p surgical removal of benign Skin lesion epigastric area      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 650 milliGRAM(s) Oral every 6 hours  aspirin enteric coated 81 milliGRAM(s) Oral daily  cloNIDine 0.1 milliGRAM(s) Oral two times a day  dextrose 5%. 1000 milliLiter(s) (50 mL/Hr) IV Continuous <Continuous>  dextrose 5%. 1000 milliLiter(s) (100 mL/Hr) IV Continuous <Continuous>  dextrose 50% Injectable 25 Gram(s) IV Push once  dextrose 50% Injectable 12.5 Gram(s) IV Push once  dextrose 50% Injectable 25 Gram(s) IV Push once  diltiazem    Tablet 60 milliGRAM(s) Oral three times a day  dronabinol 2.5 milliGRAM(s) Oral two times a day before meals  glucagon  Injectable 1 milliGRAM(s) IntraMuscular once  imipramine 50 milliGRAM(s) Oral daily  insulin lispro (ADMELOG) corrective regimen sliding scale   SubCutaneous three times a day before meals  metoprolol tartrate 100 milliGRAM(s) Oral two times a day  mirtazapine 7.5 milliGRAM(s) Oral at bedtime  multivitamin 1 Tablet(s) Oral daily  pantoprazole    Tablet 40 milliGRAM(s) Oral before breakfast  senna 2 Tablet(s) Oral at bedtime  sodium chloride 0.45%. 1000 milliLiter(s) (50 mL/Hr) IV Continuous <Continuous>      Allergies    No Known Drug Allergies  latex (Hives)    Intolerances                                  SOCIAL HISTORY:  Denies ETOh,Smoking,     FAMILY HISTORY:  FH: myocardial infarction (Father)    FH: myocardial infarction (Father)    Family history of type 2 diabetes mellitus in brother (Sibling)        REVIEW OF SYSTEMS:    unable to obtained a good review system                                               12.3   18.01 )-----------( 461      ( 09 Jul 2023 07:07 )             37.9       CBC Full  -  ( 09 Jul 2023 07:07 )  WBC Count : 18.01 K/uL  RBC Count : 3.95 M/uL  Hemoglobin : 12.3 g/dL  Hematocrit : 37.9 %  Platelet Count - Automated : 461 K/uL  Mean Cell Volume : 95.9 fl  Mean Cell Hemoglobin : 31.1 pg  Mean Cell Hemoglobin Concentration : 32.5 gm/dL  Auto Neutrophil # : x  Auto Lymphocyte # : x  Auto Monocyte # : x  Auto Eosinophil # : x  Auto Basophil # : x  Auto Neutrophil % : x  Auto Lymphocyte % : x  Auto Monocyte % : x  Auto Eosinophil % : x  Auto Basophil % : x      07-09    133<L>  |  93<L>  |  91<H>  ----------------------------<  327<H>  5.7<H>   |  26  |  6.40<H>    Ca    10.7<H>      09 Jul 2023 07:07  Mg     2.8     07-09        CAPILLARY BLOOD GLUCOSE      POCT Blood Glucose.: 312 mg/dL (09 Jul 2023 08:17)  POCT Blood Glucose.: 317 mg/dL (08 Jul 2023 21:33)  POCT Blood Glucose.: 301 mg/dL (08 Jul 2023 18:36)  POCT Blood Glucose.: 142 mg/dL (08 Jul 2023 15:43)  POCT Blood Glucose.: 366 mg/dL (08 Jul 2023 12:05)      Vital Signs Last 24 Hrs  T(C): 36.8 (09 Jul 2023 05:06), Max: 36.8 (09 Jul 2023 05:06)  T(F): 98.2 (09 Jul 2023 05:06), Max: 98.2 (09 Jul 2023 05:06)  HR: 101 (09 Jul 2023 05:06) (59 - 101)  BP: 163/61 (09 Jul 2023 05:06) (99/70 - 163/61)  BP(mean): --  RR: 17 (09 Jul 2023 05:06) (16 - 17)  SpO2: 96% (09 Jul 2023 05:06) (93% - 98%)    Parameters below as of 09 Jul 2023 05:06  Patient On (Oxygen Delivery Method): room air        Urinalysis Basic - ( 09 Jul 2023 07:07 )    Color: x / Appearance: x / SG: x / pH: x  Gluc: 327 mg/dL / Ketone: x  / Bili: x / Urobili: x   Blood: x / Protein: x / Nitrite: x   Leuk Esterase: x / RBC: x / WBC x   Sq Epi: x / Non Sq Epi: x / Bacteria: x                            PHYSICAL EXAM:    Constitutional: NAD  HEENT: conjunctive   clear   Neck:  No JVD  Respiratory: CTAB  Cardiovascular: S1 and S2  Gastrointestinal: BS+, soft, NT/ND  Extremities: No peripheral edema  Neurological:  no focal deficits  pos fistula

## 2023-07-09 NOTE — PROGRESS NOTE ADULT - ASSESSMENT
REASON FOR VISIT  .. Management of problems listed below      NOTEWORTHY FINDINGS.     PHYSICAL EXAM    HEENT Unremarkable  atraumatic   RESP Fair air entry  Harsh breath sound   CARDIAC S1 S2 No S3     NO JVD    ABDOMEN No hepatosplenomegaly   PEDAL EDEMA present No calf tenderness  NO rash       GENERAL DATA .     GOC.      ICU STAY.   .. none  COVID.   ..      BEST PRACTICE ISSUES.    HOB ELEVATN.   .. Yes  DVT PPLX.   .. 7/3/2023 apixa 2.5 x2 restarted as IR feels we should tap fluid only if she develops shortness of breath  ..    7/2/2023 Apixaba 2.5 x2 held for thorac   STRESS ULCER PPLX.   ..      INFECTION PPLX.   ..    SP SW AMINAH.    ..        DIET.    ..  7/1/2023 renal restrns   IV fl.  ..      PROCEDURES.  ..   PAST PROCEDURES.    ..     GENERAL DATA .     ALLGY.  ..     latex                     WT.  ..  7/1/2023 54  BMI.  ..    7/1/2023 17     ABGS.    VS/ PO/IO/ VENT/ DRIPS.  7/9/2023 afeb 90 160/70   7/9/2023 ra 96%     CC/DOA.        . 7/1/2023 Pt sent from [facility] for unwitnessed fall out of bed.       .  PRESENTATION.   . 7/1/2023 72-year-old female former smoker quit 3/2012  with PMH for hypertension, CAD sp cabg  PVD   sp R-> L bifem - fem BK pop bypass  Fem pop bypass 6/21/2022  Partial ray amputation r great toe 6/22/2022  diabetes, dementia, ESRD on hemodialysis   a fib on eliquis sp recent hosp stay 5/17-5/24/2023     . ENTERORHINOVIRUS INFECTION RESP TRACT 5/17  . PLEURAL EFFUSION SP l THORA 5/18 TRANSUDATE     COURSE   . ACETABULAR Fx Ortho recommended non surgical mgmt  . PL EFFSN Was decided to hold off thora unless she develops sob    PROBLEMS/ASSESSMENT/RECOMMENDATIONS (A/R).  PULMONARY.  . GAS EXCHANGE.  .. A/R.   .. Monitor pulse ox and target po 90-95%    . PLEURAL EFFSN.  .. RELEVANT PAST HISTORU  .. 5/18/2023 l thorac 1.5 l   .. 5/18 pl fl l 13 m 73 p 5 afb sm (-) g 131 l 102/268 (.38) ph 7.6 pr 2.6/6.4 (.4)  .. Fluid transudate  .. WORKUP   .. CXR 7/1/2023 cxr Mod large l pl effs or lower zone airspace consolidation/atelecatsis   .. CT ch 7/1/2023 Ct ch   .... Decreased mod l effs since 5/17/2023   .. EVENTS  .. 7/2/2023 DW pt and dw son consensus is that they want fluid remived   .. 7/3/2023 dw ir plan changed plan is to tap if she becomes symptimatic   .. A/R  .. 7/3/2023 Thora cancelled as pt is comfortable will pan thora if she becomes symptomatic     ID.  .. WORKUP.  .. W 7/1-7/2-7/3-7/6-7/7-7/9/2023 W 15 - 14 - 13- 13 - 15- 18   .. EVENTS.   .. 7/1/2023 Checlk blood and urine cs   .. uc 7/3/2023 100 k pseudomonas carbapenem resist    .. uc 7/3/2023 50-99 Klebs esbl   .. ABIO.  .. 7/8/2023 meropenem 500 x 3d Dr KAYE     CARDIO.  . HEMODYNAMICS.  .. 7/8/2023 midodrine 10.3 Dr CLIFFORD     . CAD.  .. 7/1/2023 ASA 81   .. A/R  .. cont rx    . A fib.  .. 7/1/2023 CARDIZEM 60.3   .. 7/3/2023 apixaba 2.5 x2   .. AR  .. Cont rx    HEMAT.  .. WORKUP.  .. Hb 7/1-7/2-7/3-7/6-7/9/2023 Hb 13 - 11.8 - 11.2 - 11- 12   .. INR 7/1/2023    .. A/R  .. As pt was on apixaba will monitorserially     RENAL.  . ESRD.  .. WORKUP  .. NA 7/1/2023    .. K 7/1/2023 K 5.1   .. CR 7/1/2023 CR 5    ORTHO.  . FALL 7/1/2023.  .. CT head C sp 7/1/2023 CT HC (-)     . FRACTURE ACETABULUM r INFERIOR PUBIC RAMUS r 7/1/2023   .. IMAGING.  .. XR Pelvis and r hip 7/1/2023 XR Fractures r acetabulum and inf r pubic ramus   .. 7/1/2023 ct pelvix   .... comminuted r acetabular fx involving r sup and inf pubic rami medial wallacetabulum sup acetabulum and post wall acetabulum   .... small hematoma r pelvic sidewall   .. ORTHO.  .. 7/1/2023 DW Dr Dias She spoke to Ortho Dr Jenkins group and plan is for nonsurgical management  .. A/R  .. Monitor serial Hb ro bleed     . MAIN ISSUES  .. FX acetabulum on conservative rx  . A fib on doac  . ESRD   . Pl effs for thora when sob  . 7/8/2023 started on meropenem by ID (UTI)     TIME SPENT   . About 36 minutes aggregate care time spent on encounter; activities included   direct patient care, counseling and/or coordinating care reviewing notes, lab data/ imaging , discussion with multidisciplinary team/ patient  /family and explaining in detail risks, benefits, alternatives  of the recommendations     JOSE F LAO 72 f7/1/2023 1948 DR HARRY ALBRIGHT

## 2023-07-10 LAB
ANION GAP SERPL CALC-SCNC: 14 MMOL/L — SIGNIFICANT CHANGE UP (ref 5–17)
BUN SERPL-MCNC: 113 MG/DL — HIGH (ref 7–23)
CALCIUM SERPL-MCNC: 10.4 MG/DL — HIGH (ref 8.5–10.1)
CHLORIDE SERPL-SCNC: 96 MMOL/L — SIGNIFICANT CHANGE UP (ref 96–108)
CO2 SERPL-SCNC: 23 MMOL/L — SIGNIFICANT CHANGE UP (ref 22–31)
CREAT SERPL-MCNC: 8.1 MG/DL — HIGH (ref 0.5–1.3)
EGFR: 5 ML/MIN/1.73M2 — LOW
GLUCOSE SERPL-MCNC: 342 MG/DL — HIGH (ref 70–99)
HCT VFR BLD CALC: 36.7 % — SIGNIFICANT CHANGE UP (ref 34.5–45)
HGB BLD-MCNC: 11.5 G/DL — SIGNIFICANT CHANGE UP (ref 11.5–15.5)
MAGNESIUM SERPL-MCNC: 2.9 MG/DL — HIGH (ref 1.6–2.6)
MCHC RBC-ENTMCNC: 30.6 PG — SIGNIFICANT CHANGE UP (ref 27–34)
MCHC RBC-ENTMCNC: 31.3 GM/DL — LOW (ref 32–36)
MCV RBC AUTO: 97.6 FL — SIGNIFICANT CHANGE UP (ref 80–100)
NRBC # BLD: 0 /100 WBCS — SIGNIFICANT CHANGE UP (ref 0–0)
PLATELET # BLD AUTO: 465 K/UL — HIGH (ref 150–400)
POTASSIUM SERPL-MCNC: 5.9 MMOL/L — HIGH (ref 3.5–5.3)
POTASSIUM SERPL-SCNC: 5.9 MMOL/L — HIGH (ref 3.5–5.3)
PROCALCITONIN SERPL-MCNC: 6.32 NG/ML — HIGH
RBC # BLD: 3.76 M/UL — LOW (ref 3.8–5.2)
RBC # FLD: 16.3 % — HIGH (ref 10.3–14.5)
SODIUM SERPL-SCNC: 133 MMOL/L — LOW (ref 135–145)
WBC # BLD: 19.81 K/UL — HIGH (ref 3.8–10.5)
WBC # FLD AUTO: 19.81 K/UL — HIGH (ref 3.8–10.5)

## 2023-07-10 PROCEDURE — 99233 SBSQ HOSP IP/OBS HIGH 50: CPT

## 2023-07-10 PROCEDURE — 99232 SBSQ HOSP IP/OBS MODERATE 35: CPT

## 2023-07-10 RX ORDER — INSULIN GLARGINE 100 [IU]/ML
15 INJECTION, SOLUTION SUBCUTANEOUS AT BEDTIME
Refills: 0 | Status: DISCONTINUED | OUTPATIENT
Start: 2023-07-10 | End: 2023-07-11

## 2023-07-10 RX ADMIN — APIXABAN 2.5 MILLIGRAM(S): 2.5 TABLET, FILM COATED ORAL at 17:39

## 2023-07-10 RX ADMIN — Medication 100 MILLIGRAM(S): at 17:39

## 2023-07-10 RX ADMIN — Medication 8: at 08:57

## 2023-07-10 RX ADMIN — SODIUM ZIRCONIUM CYCLOSILICATE 5 GRAM(S): 10 POWDER, FOR SUSPENSION ORAL at 11:19

## 2023-07-10 RX ADMIN — PANTOPRAZOLE SODIUM 40 MILLIGRAM(S): 20 TABLET, DELAYED RELEASE ORAL at 06:18

## 2023-07-10 RX ADMIN — APIXABAN 2.5 MILLIGRAM(S): 2.5 TABLET, FILM COATED ORAL at 06:18

## 2023-07-10 RX ADMIN — Medication 0.1 MILLIGRAM(S): at 18:29

## 2023-07-10 RX ADMIN — Medication 4: at 17:39

## 2023-07-10 RX ADMIN — SENNA PLUS 2 TABLET(S): 8.6 TABLET ORAL at 21:40

## 2023-07-10 RX ADMIN — LIDOCAINE 1 PATCH: 4 CREAM TOPICAL at 23:25

## 2023-07-10 RX ADMIN — Medication 50 MILLIGRAM(S): at 11:19

## 2023-07-10 RX ADMIN — INSULIN GLARGINE 15 UNIT(S): 100 INJECTION, SOLUTION SUBCUTANEOUS at 21:40

## 2023-07-10 RX ADMIN — Medication 2: at 12:29

## 2023-07-10 RX ADMIN — MEROPENEM 100 MILLIGRAM(S): 1 INJECTION INTRAVENOUS at 18:41

## 2023-07-10 RX ADMIN — Medication 60 MILLIGRAM(S): at 06:19

## 2023-07-10 RX ADMIN — Medication 60 MILLIGRAM(S): at 21:40

## 2023-07-10 RX ADMIN — Medication 0.1 MILLIGRAM(S): at 06:18

## 2023-07-10 RX ADMIN — MIRTAZAPINE 7.5 MILLIGRAM(S): 45 TABLET, ORALLY DISINTEGRATING ORAL at 21:40

## 2023-07-10 RX ADMIN — Medication 60 MILLIGRAM(S): at 13:27

## 2023-07-10 RX ADMIN — LIDOCAINE 1 PATCH: 4 CREAM TOPICAL at 19:20

## 2023-07-10 RX ADMIN — Medication 100 MILLIGRAM(S): at 06:17

## 2023-07-10 RX ADMIN — Medication 81 MILLIGRAM(S): at 11:19

## 2023-07-10 RX ADMIN — POLYETHYLENE GLYCOL 3350 17 GRAM(S): 17 POWDER, FOR SOLUTION ORAL at 11:20

## 2023-07-10 RX ADMIN — LIDOCAINE 1 PATCH: 4 CREAM TOPICAL at 11:20

## 2023-07-10 NOTE — PROGRESS NOTE ADULT - SUBJECTIVE AND OBJECTIVE BOX
Date of Service: 07-10-23 @ 15:46    Patient is a 72y old  Female who presents with a chief complaint of s/p fall (10 Jul 2023 15:04)      INTERVAL HPI/OVERNIGHT EVENTS: Patient seen and examined. NAD.     Vital Signs Last 24 Hrs  T(C): 36.6 (10 Jul 2023 11:24), Max: 36.8 (10 Jul 2023 04:59)  T(F): 97.9 (10 Jul 2023 11:24), Max: 98.2 (10 Jul 2023 04:59)  HR: 92 (10 Jul 2023 13:30) (78 - 93)  BP: 159/84 (10 Jul 2023 13:30) (117/60 - 159/84)  BP(mean): --  RR: 18 (10 Jul 2023 11:24) (18 - 18)  SpO2: 97% (10 Jul 2023 11:24) (93% - 97%)    Parameters below as of 10 Jul 2023 11:24  Patient On (Oxygen Delivery Method): room air        07-10    133<L>  |  96  |  113<H>  ----------------------------<  342<H>  5.9<H>   |  23  |  8.10<H>    Ca    10.4<H>      10 Jul 2023 09:00  Mg     2.9     07-10                            11.5   19.81 )-----------( 465      ( 10 Jul 2023 09:00 )             36.7       CAPILLARY BLOOD GLUCOSE      POCT Blood Glucose.: 200 mg/dL (10 Jul 2023 12:25)  POCT Blood Glucose.: 331 mg/dL (10 Jul 2023 08:14)  POCT Blood Glucose.: 242 mg/dL (09 Jul 2023 22:16)  POCT Blood Glucose.: 243 mg/dL (09 Jul 2023 17:29)    Urinalysis Basic - ( 10 Jul 2023 09:00 )    Color: x / Appearance: x / SG: x / pH: x  Gluc: 342 mg/dL / Ketone: x  / Bili: x / Urobili: x   Blood: x / Protein: x / Nitrite: x   Leuk Esterase: x / RBC: x / WBC x   Sq Epi: x / Non Sq Epi: x / Bacteria: x              acetaminophen     Tablet .. 650 milliGRAM(s) Oral every 6 hours PRN  apixaban 2.5 milliGRAM(s) Oral two times a day  aspirin enteric coated 81 milliGRAM(s) Oral daily  cloNIDine 0.1 milliGRAM(s) Oral two times a day  dextrose 5%. 1000 milliLiter(s) IV Continuous <Continuous>  dextrose 5%. 1000 milliLiter(s) IV Continuous <Continuous>  dextrose 50% Injectable 12.5 Gram(s) IV Push once  dextrose 50% Injectable 25 Gram(s) IV Push once  dextrose 50% Injectable 25 Gram(s) IV Push once  dextrose Oral Gel 15 Gram(s) Oral once PRN  diltiazem    Tablet 60 milliGRAM(s) Oral three times a day  glucagon  Injectable 1 milliGRAM(s) IntraMuscular once  imipramine 50 milliGRAM(s) Oral daily  insulin glargine Injectable (LANTUS) 15 Unit(s) SubCutaneous at bedtime  insulin lispro (ADMELOG) corrective regimen sliding scale   SubCutaneous three times a day before meals  insulin lispro (ADMELOG) corrective regimen sliding scale   SubCutaneous at bedtime  lidocaine   4% Patch 1 Patch Transdermal daily  meropenem  IVPB 500 milliGRAM(s) IV Intermittent every 24 hours  metoprolol tartrate 100 milliGRAM(s) Oral two times a day  midodrine 10 milliGRAM(s) Oral <User Schedule> PRN  mirtazapine 7.5 milliGRAM(s) Oral at bedtime  pantoprazole    Tablet 40 milliGRAM(s) Oral before breakfast  polyethylene glycol 3350 17 Gram(s) Oral daily  senna 2 Tablet(s) Oral at bedtime  sodium zirconium cyclosilicate 5 Gram(s) Oral daily              REVIEW OF SYSTEMS: unobtainable      Consultant(s) Notes Reviewed:  [X] YES  [ ] NO    PHYSICAL EXAM:  GENERAL: NAD  HEAD:  Atraumatic, Normocephalic  EYES: EOMI, PERRLA, conjunctiva and sclera clear  ENMT: No tonsillar erythema, exudates, or enlargement; Moist mucous membranes  NECK: Supple, No JVD  NERVOUS SYSTEM:  Awake & alert  CHEST/LUNG: Clear to auscultation bilaterally; No rales, rhonchi, wheezing,  HEART: Regular rate and rhythm  ABDOMEN: Soft, Nontender, Nondistended; Bowel sounds present  EXTREMITIES:  No clubbing, cyanosis, or edema  LYMPH: No lymphadenopathy noted  SKIN: No rashes      Advanced care planning discussed with patient/family [X] YES   [ ] NO    Advanced care planning discussed with patient/family. Patient's health status was discussed. All appropriate changes have been made regarding patient's end-of-life care. Advanced care planning forms reviewed/discussed/completed.  20 minutes spent.

## 2023-07-10 NOTE — PROGRESS NOTE ADULT - ASSESSMENT
72-year-old female with significant past medical history for hypertension, diabetes, dementia, end-stage renal disease on hemodialysis presents to the ED status post unwitnessed fall from [facility].  Patient states that she heard some voices then rolled out of bed.  Patient complaining of right hip pain.  Unknown head trauma.  + Eliquis < from: Xray Femur showed  Fractures including the medial wall of the acetabulum and inferior right pubic ramus Admitted for pain management ,PT/OT

## 2023-07-10 NOTE — PROGRESS NOTE ADULT - SUBJECTIVE AND OBJECTIVE BOX
Patient is a 72y Female whom presented to the hospital with esrd on hd     PAST MEDICAL & SURGICAL HISTORY:  HTN (hypertension)      h/o Anxiety attack      Depression      h/o Myocardial infarct 2007      h/o Hepatitis A 1969  currently resolved, no symptoms      Murmur, cardiac      h/o Smoking  quitted 3/2012      Anemia secondary to renal failure      ESRD on dialysis      Falls      Ataxia      Type 2 diabetes mellitus      Peripheral vascular disease, unspecified      CAD (coronary artery disease)      Diabetes      coronary stent 2007      s/p Ovarian cyst removal      s/p surgical removal of benign Skin lesion epigastric area      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 650 milliGRAM(s) Oral every 6 hours  aspirin enteric coated 81 milliGRAM(s) Oral daily  cloNIDine 0.1 milliGRAM(s) Oral two times a day  dextrose 5%. 1000 milliLiter(s) (50 mL/Hr) IV Continuous <Continuous>  dextrose 5%. 1000 milliLiter(s) (100 mL/Hr) IV Continuous <Continuous>  dextrose 50% Injectable 25 Gram(s) IV Push once  dextrose 50% Injectable 12.5 Gram(s) IV Push once  dextrose 50% Injectable 25 Gram(s) IV Push once  diltiazem    Tablet 60 milliGRAM(s) Oral three times a day  dronabinol 2.5 milliGRAM(s) Oral two times a day before meals  glucagon  Injectable 1 milliGRAM(s) IntraMuscular once  imipramine 50 milliGRAM(s) Oral daily  insulin lispro (ADMELOG) corrective regimen sliding scale   SubCutaneous three times a day before meals  metoprolol tartrate 100 milliGRAM(s) Oral two times a day  mirtazapine 7.5 milliGRAM(s) Oral at bedtime  multivitamin 1 Tablet(s) Oral daily  pantoprazole    Tablet 40 milliGRAM(s) Oral before breakfast  senna 2 Tablet(s) Oral at bedtime  sodium chloride 0.45%. 1000 milliLiter(s) (50 mL/Hr) IV Continuous <Continuous>      Allergies    No Known Drug Allergies  latex (Hives)    Intolerances                                  SOCIAL HISTORY:  Denies ETOh,Smoking,     FAMILY HISTORY:  FH: myocardial infarction (Father)    FH: myocardial infarction (Father)    Family history of type 2 diabetes mellitus in brother (Sibling)        REVIEW OF SYSTEMS:    unable to obtained a good review system                                                                11.5   19.81 )-----------( 465      ( 10 Jul 2023 09:00 )             36.7       CBC Full  -  ( 10 Jul 2023 09:00 )  WBC Count : 19.81 K/uL  RBC Count : 3.76 M/uL  Hemoglobin : 11.5 g/dL  Hematocrit : 36.7 %  Platelet Count - Automated : 465 K/uL  Mean Cell Volume : 97.6 fl  Mean Cell Hemoglobin : 30.6 pg  Mean Cell Hemoglobin Concentration : 31.3 gm/dL  Auto Neutrophil # : x  Auto Lymphocyte # : x  Auto Monocyte # : x  Auto Eosinophil # : x  Auto Basophil # : x  Auto Neutrophil % : x  Auto Lymphocyte % : x  Auto Monocyte % : x  Auto Eosinophil % : x  Auto Basophil % : x      07-10    133<L>  |  96  |  113<H>  ----------------------------<  342<H>  5.9<H>   |  23  |  8.10<H>    Ca    10.4<H>      10 Jul 2023 09:00  Mg     2.9     07-10        CAPILLARY BLOOD GLUCOSE      POCT Blood Glucose.: 331 mg/dL (10 Jul 2023 08:14)  POCT Blood Glucose.: 242 mg/dL (09 Jul 2023 22:16)  POCT Blood Glucose.: 243 mg/dL (09 Jul 2023 17:29)  POCT Blood Glucose.: 263 mg/dL (09 Jul 2023 12:19)      Vital Signs Last 24 Hrs  T(C): 36.8 (10 Jul 2023 04:59), Max: 36.8 (09 Jul 2023 11:52)  T(F): 98.2 (10 Jul 2023 04:59), Max: 98.2 (09 Jul 2023 11:52)  HR: 93 (10 Jul 2023 04:59) (78 - 98)  BP: 138/83 (10 Jul 2023 04:59) (138/83 - 165/78)  BP(mean): --  RR: 18 (10 Jul 2023 04:59) (17 - 18)  SpO2: 93% (10 Jul 2023 04:59) (92% - 93%)    Parameters below as of 10 Jul 2023 04:59  Patient On (Oxygen Delivery Method): room air        Urinalysis Basic - ( 10 Jul 2023 09:00 )    Color: x / Appearance: x / SG: x / pH: x  Gluc: 342 mg/dL / Ketone: x  / Bili: x / Urobili: x   Blood: x / Protein: x / Nitrite: x   Leuk Esterase: x / RBC: x / WBC x   Sq Epi: x / Non Sq Epi: x / Bacteria: x                                PHYSICAL EXAM:    Constitutional: NAD  HEENT: conjunctive   clear   Neck:  No JVD  Respiratory: CTAB  Cardiovascular: S1 and S2  Gastrointestinal: BS+, soft, NT/ND  Extremities: No peripheral edema  Neurological:  no focal deficits  pos fistula

## 2023-07-10 NOTE — PROGRESS NOTE ADULT - SUBJECTIVE AND OBJECTIVE BOX
[facility]  INFECTIOUS DISEASES - [address], [place] [address]  Tel: [phone]     Fax: [phone]  =======================================================    SHILO KOHLER [id number]    Follow up: No fevers. No reported diarrhea.    Allergies:  No Known Drug Allergies  latex (Hives)      Antibiotics:  acetaminophen     Tablet .. 650 milliGRAM(s) Oral every 6 hours PRN  apixaban 2.5 milliGRAM(s) Oral two times a day  aspirin enteric coated 81 milliGRAM(s) Oral daily  cloNIDine 0.1 milliGRAM(s) Oral two times a day  dextrose 5%. 1000 milliLiter(s) IV Continuous <Continuous>  dextrose 5%. 1000 milliLiter(s) IV Continuous <Continuous>  dextrose 50% Injectable 12.5 Gram(s) IV Push once  dextrose 50% Injectable 25 Gram(s) IV Push once  dextrose 50% Injectable 25 Gram(s) IV Push once  dextrose Oral Gel 15 Gram(s) Oral once PRN  diltiazem    Tablet 60 milliGRAM(s) Oral three times a day  glucagon  Injectable 1 milliGRAM(s) IntraMuscular once  imipramine 50 milliGRAM(s) Oral daily  insulin glargine Injectable (LANTUS) 15 Unit(s) SubCutaneous at bedtime  insulin lispro (ADMELOG) corrective regimen sliding scale   SubCutaneous three times a day before meals  insulin lispro (ADMELOG) corrective regimen sliding scale   SubCutaneous at bedtime  lidocaine   4% Patch 1 Patch Transdermal daily  meropenem  IVPB 500 milliGRAM(s) IV Intermittent every 24 hours  metoprolol tartrate 100 milliGRAM(s) Oral two times a day  midodrine 10 milliGRAM(s) Oral <User Schedule> PRN  mirtazapine 7.5 milliGRAM(s) Oral at bedtime  pantoprazole    Tablet 40 milliGRAM(s) Oral before breakfast  polyethylene glycol 3350 17 Gram(s) Oral daily  senna 2 Tablet(s) Oral at bedtime  sodium zirconium cyclosilicate 5 Gram(s) Oral daily       REVIEW OF SYSTEMS:  unable to obtain 2/2 dementia     Physical Exam:  ICU Vital Signs Last 24 Hrs  T(C): 36.6 (10 Jul 2023 11:24), Max: 36.8 (10 Jul 2023 04:59)  T(F): 97.9 (10 Jul 2023 11:24), Max: 98.2 (10 Jul 2023 04:59)  HR: 92 (10 Jul 2023 13:30) (78 - 93)  BP: 159/84 (10 Jul 2023 13:30) (117/60 - 159/84)  BP(mean): --  ABP: --  ABP(mean): --  RR: 18 (10 Jul 2023 11:24) (18 - 18)  SpO2: 97% (10 Jul 2023 11:24) (93% - 97%)    O2 Parameters below as of 10 Jul 2023 11:24  Patient On (Oxygen Delivery Method): room air      GEN: asleep but easily arousable, not in apparent distress  HEENT: normocephalic and atraumatic.  NECK: Supple.    LUNGS: Normal respiratoty effort  HEART: Regular rate and rhythm   ABDOMEN: Soft, nontender, and nondistended.   EXTREMITIES: No leg edema. no noted RLE edema, RUE AV fistula  NEUROLOGIC: AO x 1      Labs:  07-10    133<L>  |  96  |  113<H>  ----------------------------<  342<H>  5.9<H>   |  23  |  8.10<H>    Ca    10.4<H>      10 Jul 2023 09:00  Mg     2.9     07-10                            11.5   19.81 )-----------( 465      ( 10 Jul 2023 09:00 )             36.7       Urinalysis Basic - ( 10 Jul 2023 09:00 )    Color: x / Appearance: x / SG: x / pH: x  Gluc: 342 mg/dL / Ketone: x  / Bili: x / Urobili: x   Blood: x / Protein: x / Nitrite: x   Leuk Esterase: x / RBC: x / WBC x   Sq Epi: x / Non Sq Epi: x / Bacteria: x          RECENT CULTURES:  07-03 @ 21:20 Clean Catch Clean Catch (Midstream) Pseudomonas aeruginosa (Carbapenem Resistant)  Klebsiella pneumoniae ESBL    >100,000 CFU/ml Pseudomonas aeruginosa (Carbapenem Resistant)  50,000 - 99,000 CFU/mL Klebsiella pneumoniae ESBL        07-03 @ 16:10 .Blood Blood     No growth at 5 days        07-03 @ 16:05 .Blood Blood     No growth at 5 days              All imaging and data are reviewed.

## 2023-07-10 NOTE — PROGRESS NOTE ADULT - ASSESSMENT
REASON FOR VISIT  .. Management of problems listed below      NOTEWORTHY FINDINGS.     PHYSICAL EXAM    HEENT Unremarkable  atraumatic   RESP Fair air entry  Harsh breath sound   CARDIAC S1 S2 No S3     NO JVD    ABDOMEN No hepatosplenomegaly   PEDAL EDEMA present No calf tenderness  NO rash       GENERAL DATA .     GOC.      ICU STAY.   .. none  COVID.   ..      BEST PRACTICE ISSUES.    HOB ELEVATN.   .. Yes  DVT PPLX.   .. 7/3/2023 apixa 2.5 x2 restarted as IR feels we should tap fluid only if she develops shortness of breath  ..    7/2/2023 Apixaba 2.5 x2 held for thorac   STRESS ULCER PPLX.   ..      INFECTION PPLX.   ..    SP SW AMINAH.    ..        DIET.    ..  7/1/2023 renal restrns   IV fl.  ..      PROCEDURES.  ..   PAST PROCEDURES.    ..     GENERAL DATA .     ALLGY.  ..     latex                     WT.  ..  7/1/2023 54  BMI.  ..    7/1/2023 17     ABGS.    VS/ PO/IO/ VENT/ DRIPS  7/10/2023 afeb 90 130/80   7/10/2023 ra 97%     CC/DOA.        . 7/1/2023 Pt sent from [facility] for unwitnessed fall out of bed.       .  PRESENTATION.   . 7/1/2023 72-year-old female former smoker quit 3/2012  with PMH for hypertension, CAD sp cabg  PVD   sp R-> L bifem - fem BK pop bypass  Fem pop bypass 6/21/2022  Partial ray amputation r great toe 6/22/2022  diabetes, dementia, ESRD on hemodialysis   a fib on eliquis sp recent hosp stay 5/17-5/24/2023     . ENTERORHINOVIRUS INFECTION RESP TRACT 5/17  . PLEURAL EFFUSION SP l THORA 5/18 TRANSUDATE     COURSE   . ACETABULAR Fx Ortho recommended non surgical mgmt  . PL EFFSN Was decided to hold off thora unless she develops sob    PROBLEMS/ASSESSMENT/RECOMMENDATIONS (A/R).  PULMONARY.  . GAS EXCHANGE.  .. A/R.   .. Monitor pulse ox and target po 90-95%    . PLEURAL EFFSN.  .. RELEVANT PAST HISTORU  .. 5/18/2023 l thorac 1.5 l   .. 5/18 pl fl l 13 m 73 p 5 afb sm (-) g 131 l 102/268 (.38) ph 7.6 pr 2.6/6.4 (.4)  .. Fluid transudate  .. WORKUP   .. CXR 7/1/2023 cxr Mod large l pl effs or lower zone airspace consolidation/atelecatsis   .. CT ch 7/1/2023 Ct ch   .... Decreased mod l effs since 5/17/2023   .. EVENTS  .. 7/2/2023 DW pt and dw son consensus is that they want fluid remived   .. 7/3/2023 dw ir plan changed plan is to tap if she becomes symptimatic   .. A/R  .. 7/3/2023 Thora cancelled as pt is comfortable will pan thora if she becomes symptomatic   .. 7/10/2023 as pt has persistent leukocytosis if no impovement will need thora so apixa 2.5 bid staoppd     ID.  .. WORKUP.  .. W 7/1-7/2-7/3-7/6-7/7-7/9-7/10/2023 W 15 - 14 - 13- 13 - 15- 18 - 19   .. EVENTS.   .. 7/1/2023 Checlk blood and urine cs   .. uc 7/3/2023 100 k pseudomonas carbapenem resist    ..  7/3/2023 50-99 Klebs esbl   .. ABIO.  .. 7/8/2023 meropenem 500 x 3d Dr KAYE     CARDIO.  . HEMODYNAMICS.  .. 7/8/2023 midodrine 10.3 Dr CLIFFORD     . CAD.  .. 7/1/2023 ASA 81   .. A/R  .. cont rx    . A fib.  .. 7/1/2023 CARDIZEM 60.3   .. 7/3/2023 apixaba 2.5 x2   .. AR  .. Cont rx    HEMAT.  .. WORKUP.  .. Hb 7/1-7/2-7/3-7/6-7/9/2023 Hb 13 - 11.8 - 11.2 - 11- 12   .. INR 7/1/2023    .. A/R  .. As pt was on apixaba will monitorserially     RENAL.  . ESRD.  .. WORKUP  .. NA 7/1/2023    .. K 7/1/2023 K 5.1   .. CR 7/1/2023 CR 5    ORTHO.  . FALL 7/1/2023.  .. CT head C sp 7/1/2023 CT HC (-)     . FRACTURE ACETABULUM r INFERIOR PUBIC RAMUS r 7/1/2023   .. IMAGING.  .. XR Pelvis and r hip 7/1/2023 XR Fractures r acetabulum and inf r pubic ramus   .. 7/1/2023 ct pelvix   .... comminuted r acetabular fx involving r sup and inf pubic rami medial wallacetabulum sup acetabulum and post wall acetabulum   .... small hematoma r pelvic sidewall   .. ORTHO.  .. 7/1/2023 DW Dr Dias She spoke to Ortho Dr Jenkins group and plan is for nonsurgical management  .. A/R  .. Monitor serial Hb ro bleed     . MAIN ISSUES  .. FX acetabulum on conservative rx  . A fib on doac  . ESRD   . Pl effs for thora when sob  . 7/8/2023 started on meropenem by ID (UTI)   . 7/10/2023 If leukocytosis persists will plan thorac apixa stoppd     TIME SPENT   . About 36 minutes aggregate care time spent on encounter; activities included   direct patient care, counseling and/or coordinating care reviewing notes, lab data/ imaging , discussion with multidisciplinary team/ patient  /family and explaining in detail risks, benefits, alternatives  of the recommendations     JOSE F LAO 72 f7/1/2023 1948 DR HARRY ALBRIGHT

## 2023-07-10 NOTE — PROGRESS NOTE ADULT - ASSESSMENT
72-year-old female former smoker quit 3/2012  with significant past medical history for hypertension, CAD sp cabg  PVD  diabetes, dementia, end-stage renal disease on hemodialysis   a fib on eliquis sp recent hosp stay 5/17-5/24/2023  admitted 3/7-3/11/2023 and before that 2/22-2/25/2023   . During 5/2023 admission she had   . ENTERORHINOVIRUS INFECTION RESP TRACT 5/17  . PLEURAL EFFUSION SP l THORA 5/18 TRANSUDATE     . Patient now  presented to the ED 7/1/2023 status post unwitnessed fall from [facility].  Patient states that she heard some voices then rolled out of bed.  Patient complaining of right hip pain.  Unknown head trauma.  + Ashleedeangelo     She was found to have acetabular fracture which Ortho was contacted and they plan non operative management Pt was [facility] to [facility] for bleed as she was on apixaban and was noted to have pl effsn    . 7/1/2023  I was asked to admit pt                      (01 Jul 2023 13:32)      esrd on hd tues thurs sat     ANEMIA PLAN:  Anemia of chronic disease:  We will continue epo aiming for a HCT of 32-36 %.   We will monitor Iron stores, B12 and RBC folate .      BP monitoring,continue current antihypertensive meds, low salt diet,followup with PMD in 1-2 weeks      Accuchecks monitoring and insulin sliding scale coverage, no concentrated sweets diet, serial labs and f/up with PMD, monitor HB A 1 C every 3-4 mnth

## 2023-07-10 NOTE — PROGRESS NOTE ADULT - ASSESSMENT
71YO F PMH hypertension, diabetes, dementia, end-stage renal disease on hemodialysis, who presented status post unwitnessed fall from [place]- rolled out of bed. Patient complained of right hip pain--found to have fractures including the medial wall of the acetabulum and inferior right pubic ramus.    Patient found to have leukocytosis, likely reactive 2/2 hip fracture, although started on empiric antibiotics on 7/4 given new fever. She has no fever although leukocytosis is persistent. Currently being treated for ESBL klebsiella in urine. Urine culture also grew CRE pseudomonas, probably colonizer but will monitor. CT chest and A/P showed known L pleural effusion but no other evidence of infection.    #Leukocytosis  #Positive urine culture    -continue meropenem (renally dosed)--if any fevers or worsening will broaden to ceftolozane-tazobactam (Zerbaxa)  -will repeat blood cultures  -aspiration precautions  -wound care  -monitor WBC--if worsening consider thoracentesis  -discussed with Dr. Norbert Tsai MD  Division of infectious Diseases  Cell [phone] between 8am and 6pm  After 6pm and over the weekends please call ID service line at [phone].      71YO F PMH hypertension, diabetes, dementia, end-stage renal disease on hemodialysis, who presented status post unwitnessed fall from [facility]- rolled out of bed. Patient complained of right hip pain--found to have fractures including the medial wall of the acetabulum and inferior right pubic ramus. Patient found to have leukocytosis, likely reactive 2/2 hip fracture, although started on empiric antibiotics on 7/4 given new fever.     She has no fever and is on room air. although leukocytosis is persistent. Serum procalcitonin elevated although she is already on antibiotics and difficult to interpret in patient with ESRD. Currently being treated for ESBL klebsiella in urine. Urine culture also grew CRE pseudomonas, probably colonizer but will monitor. CT chest and A/P showed known L pleural effusion but no other evidence of infection.    #Leukocytosis  #Positive urine culture    -continue meropenem (renally dosed)--if any fevers or worsening will broaden to ceftolozane-tazobactam (Zerbaxa)  -will repeat blood cultures  -aspiration precautions  -wound care  -monitor WBC--if worsening consider thoracentesis  -discussed with Dr. Norbert Tsai MD  Division of infectious Diseases  Cell [phone] between 8am and 6pm  After 6pm and over the weekends please call ID service line at [phone].

## 2023-07-10 NOTE — PROGRESS NOTE ADULT - SUBJECTIVE AND OBJECTIVE BOX
CHIEF COMPLAINT/ REASON FOR VISIT  .. Patient was seen to address the  issue listed under PROBLEM LIST which is located toward bottom of this note     SHILO KOHLER    [facility]AR[facility] 3WES 366 D1    Allergies    No Known Drug Allergies  latex (Hives)    Intolerances        PAST MEDICAL & SURGICAL HISTORY:  HTN (hypertension)      h/o Anxiety attack      Depression      h/o Myocardial infarct 2007      h/o Hepatitis A 1969  currently resolved, no symptoms      Murmur, cardiac      h/o Smoking  quitted 3/2012      Anemia secondary to renal failure      ESRD on dialysis      Falls      Ataxia      Type 2 diabetes mellitus      Peripheral vascular disease, unspecified      CAD (coronary artery disease)      Diabetes      coronary stent 2007      s/p Ovarian cyst removal      s/p surgical removal of benign Skin lesion epigastric area      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          FAMILY HISTORY:  FH: myocardial infarction (Father)    FH: myocardial infarction (Father)    Family history of type 2 diabetes mellitus in brother (Sibling)        Home Medications:  acetaminophen 325 mg oral tablet: 2 tab(s) orally every 6 hours as needed (02 Jul 2023 15:24)  Admelog SoloStar 100 units/mL injectable solution: injectable 3 times a day (before meals)sliding scale  (02 Jul 2023 15:24)  apixaban 2.5 mg oral tablet: 1 tab(s) orally 2 times a day (02 Jul 2023 15:24)  aspirin 81 mg oral delayed release tablet: 1 tab(s) orally once a day (at bedtime) (02 Jul 2023 15:24)  atorvastatin 20 mg oral tablet: 1 tab(s) orally once a day (at bedtime) (02 Jul 2023 15:24)  bacitracin 500 units/g topical ointment: Apply topically to affected area once a day to right knee abrasion (02 Jul 2023 11:54)  Basaglar KwikPen 100 units/mL subcutaneous solution: 8 international unit(s) subcutaneous once a day (at bedtime) (02 Jul 2023 15:24)  cloNIDine 0.1 mg oral tablet: 1 tab(s) orally 2 times a day (02 Jul 2023 15:24)  DilTIAZem (Eqv-Cardizem CD) 180 mg/24 hours oral capsule, extended release: 1 cap(s) orally once a day (02 Jul 2023 15:24)  imipramine 50 mg oral tablet: 1 tab(s) orally once a day (in the evening) (02 Jul 2023 15:24)  metoprolol tartrate 100 mg oral tablet: 1 tab(s) orally 2 times a day (02 Jul 2023 15:24)  mirtazapine 7.5 mg oral tablet: 1 tab(s) orally once a day (at bedtime) (02 Jul 2023 15:24)  Nephro-Alfie oral tablet: 1 tab(s) orally once a day (02 Jul 2023 15:24)  PANTOPRAZOLE 40MG DR TAB: 1 tab(s) orally once a day (02 Jul 2023 15:24)  senna leaf extract oral tablet: 2 tab(s) orally once a day (at bedtime) (02 Jul 2023 15:24)      MEDICATIONS  (STANDING):  apixaban 2.5 milliGRAM(s) Oral two times a day  aspirin enteric coated 81 milliGRAM(s) Oral daily  cloNIDine 0.1 milliGRAM(s) Oral two times a day  dextrose 5%. 1000 milliLiter(s) (100 mL/Hr) IV Continuous <Continuous>  dextrose 5%. 1000 milliLiter(s) (50 mL/Hr) IV Continuous <Continuous>  dextrose 50% Injectable 12.5 Gram(s) IV Push once  dextrose 50% Injectable 25 Gram(s) IV Push once  dextrose 50% Injectable 25 Gram(s) IV Push once  diltiazem    Tablet 60 milliGRAM(s) Oral three times a day  glucagon  Injectable 1 milliGRAM(s) IntraMuscular once  imipramine 50 milliGRAM(s) Oral daily  insulin glargine Injectable (LANTUS) 8 Unit(s) SubCutaneous at bedtime  insulin lispro (ADMELOG) corrective regimen sliding scale   SubCutaneous three times a day before meals  insulin lispro (ADMELOG) corrective regimen sliding scale   SubCutaneous at bedtime  lidocaine   4% Patch 1 Patch Transdermal daily  meropenem  IVPB 500 milliGRAM(s) IV Intermittent every 24 hours  metoprolol tartrate 100 milliGRAM(s) Oral two times a day  mirtazapine 7.5 milliGRAM(s) Oral at bedtime  pantoprazole    Tablet 40 milliGRAM(s) Oral before breakfast  polyethylene glycol 3350 17 Gram(s) Oral daily  senna 2 Tablet(s) Oral at bedtime  sodium zirconium cyclosilicate 5 Gram(s) Oral daily    MEDICATIONS  (PRN):  acetaminophen     Tablet .. 650 milliGRAM(s) Oral every 6 hours PRN Temp greater or equal to 38C (100.4F), Mild Pain (1 - 3)  dextrose Oral Gel 15 Gram(s) Oral once PRN Blood Glucose LESS THAN 70 milliGRAM(s)/deciliter  midodrine 10 milliGRAM(s) Oral <User Schedule> PRN b/p              Vital Signs Last 24 Hrs  T(C): 36.8 (10 Jul 2023 04:59), Max: 36.8 (09 Jul 2023 11:52)  T(F): 98.2 (10 Jul 2023 04:59), Max: 98.2 (09 Jul 2023 11:52)  HR: 93 (10 Jul 2023 04:59) (78 - 98)  BP: 138/83 (10 Jul 2023 04:59) (138/83 - 165/78)  BP(mean): --  RR: 18 (10 Jul 2023 04:59) (17 - 18)  SpO2: 93% (10 Jul 2023 04:59) (92% - 93%)    Parameters below as of 10 Jul 2023 04:59  Patient On (Oxygen Delivery Method): room air                  LABS:                        12.3   18.01 )-----------( 461      ( 09 Jul 2023 07:07 )             37.9     07-09    133<L>  |  93<L>  |  91<H>  ----------------------------<  327<H>  5.7<H>   |  26  |  6.40<H>    Ca    10.7<H>      09 Jul 2023 07:07  Mg     2.8     07-09        Urinalysis Basic - ( 09 Jul 2023 07:07 )    Color: x / Appearance: x / SG: x / pH: x  Gluc: 327 mg/dL / Ketone: x  / Bili: x / Urobili: x   Blood: x / Protein: x / Nitrite: x   Leuk Esterase: x / RBC: x / WBC x   Sq Epi: x / Non Sq Epi: x / Bacteria: x            WBC:  WBC Count: 18.01 K/uL (07-09 @ 07:07)  WBC Count: 15.89 K/uL (07-07 @ 07:27)  WBC Count: 13.16 K/uL (07-06 @ 07:50)      MICROBIOLOGY:  RECENT CULTURES:  07-03 Clean Catch Clean Catch (Midstream) Pseudomonas aeruginosa (Carbapenem Resistant)  Klebsiella pneumoniae ESBL XXXX   >100,000 CFU/ml Pseudomonas aeruginosa (Carbapenem Resistant)  50,000 - 99,000 CFU/mL Klebsiella pneumoniae ESBL    07-03 .Blood Blood XXXX XXXX   No growth at 5 days    07-03 .Blood Blood XXXX XXXX   No growth at 5 days                    Sodium:  Sodium: 133 mmol/L (07-09 @ 07:07)  Sodium: 131 mmol/L (07-07 @ 07:27)  Sodium: 131 mmol/L (07-06 @ 07:50)      6.40 mg/dL 07-09 @ 07:07  5.30 mg/dL 07-07 @ 07:27  6.70 mg/dL 07-06 @ 07:50      Hemoglobin:  Hemoglobin: 12.3 g/dL (07-09 @ 07:07)  Hemoglobin: 12.3 g/dL (07-07 @ 07:27)  Hemoglobin: 11.1 g/dL (07-06 @ 07:50)      Platelets: Platelet Count - Automated: 461 K/uL (07-09 @ 07:07)  Platelet Count - Automated: 442 K/uL (07-07 @ 07:27)  Platelet Count - Automated: 364 K/uL (07-06 @ 07:50)          Urinalysis Basic - ( 09 Jul 2023 07:07 )    Color: x / Appearance: x / SG: x / pH: x  Gluc: 327 mg/dL / Ketone: x  / Bili: x / Urobili: x   Blood: x / Protein: x / Nitrite: x   Leuk Esterase: x / RBC: x / WBC x   Sq Epi: x / Non Sq Epi: x / Bacteria: x        RADIOLOGY & ADDITIONAL STUDIES:      MICROBIOLOGY:  RECENT CULTURES:  07-03 Clean Catch Clean Catch (Midstream) Pseudomonas aeruginosa (Carbapenem Resistant)  Klebsiella pneumoniae ESBL XXXX   >100,000 CFU/ml Pseudomonas aeruginosa (Carbapenem Resistant)  50,000 - 99,000 CFU/mL Klebsiella pneumoniae ESBL    07-03 .Blood Blood XXXX XXXX   No growth at 5 days    07-03 .Blood Blood XXXX XXXX   No growth at 5 days

## 2023-07-10 NOTE — PROGRESS NOTE ADULT - SUBJECTIVE AND OBJECTIVE BOX
Patient is a 72y Female with a known history of :  Hip fracture [S72.009A]    Closed pelvic fracture [S32.9XXA]    Pubic ramus fracture [S32.599A]    Right acetabular fracture [S32.401A]    ESRD on dialysis [N18.6]    Prophylactic measure [Z29.9]    HTN (hypertension) [I10]    Fall [W19.XXXA]    Pleural effusion [J90]    Acute febrile illness [R50.9]    Type 2 diabetes mellitus [E11.9]      HPI:  72-year-old female with significant past medical history for hypertension, diabetes, dementia, end-stage renal disease on hemodialysis presents to the ED status post unwitnessed fall from [facility].  Patient states that she heard some voices then rolled out of bed.  Patient complaining of right hip pain.  Unknown head trauma.  + Eliquis < from: Xray Femur showed  Fractures including the medial wall of the acetabulum and inferior right pubic ramus Admitted for pain management ,PT/OT        (01 Jul 2023 15:24)      REVIEW OF SYSTEMS:    CONSTITUTIONAL: No fever, weight loss, or fatigue  EYES: No eye pain, visual disturbances, or discharge  ENMT:  No difficulty hearing, tinnitus, vertigo; No sinus or throat pain  NECK: No pain or stiffness  BREASTS: No pain, masses, or nipple discharge  RESPIRATORY: No cough, wheezing, chills or hemoptysis; No shortness of breath  CARDIOVASCULAR: No chest pain, palpitations, dizziness, or leg swelling  GASTROINTESTINAL: No abdominal or epigastric pain. No nausea, vomiting, or hematemesis; No diarrhea or constipation. No melena or hematochezia.  GENITOURINARY: No dysuria, frequency, hematuria, or incontinence  NEUROLOGICAL: No headaches, memory loss, loss of strength, numbness, or tremors  SKIN: No itching, burning, rashes, or lesions   LYMPH NODES: No enlarged glands  ENDOCRINE: No heat or cold intolerance; No hair loss  MUSCULOSKELETAL: No joint pain or swelling; No muscle, back, or extremity pain  PSYCHIATRIC: No depression, anxiety, mood swings, or difficulty sleeping  HEME/LYMPH: No easy bruising, or bleeding gums  ALLERGY AND IMMUNOLOGIC: No hives or eczema    MEDICATIONS  (STANDING):  apixaban 2.5 milliGRAM(s) Oral two times a day  aspirin enteric coated 81 milliGRAM(s) Oral daily  cloNIDine 0.1 milliGRAM(s) Oral two times a day  dextrose 5%. 1000 milliLiter(s) (100 mL/Hr) IV Continuous <Continuous>  dextrose 5%. 1000 milliLiter(s) (50 mL/Hr) IV Continuous <Continuous>  dextrose 50% Injectable 12.5 Gram(s) IV Push once  dextrose 50% Injectable 25 Gram(s) IV Push once  dextrose 50% Injectable 25 Gram(s) IV Push once  diltiazem    Tablet 60 milliGRAM(s) Oral three times a day  glucagon  Injectable 1 milliGRAM(s) IntraMuscular once  imipramine 50 milliGRAM(s) Oral daily  insulin glargine Injectable (LANTUS) 8 Unit(s) SubCutaneous at bedtime  insulin lispro (ADMELOG) corrective regimen sliding scale   SubCutaneous three times a day before meals  insulin lispro (ADMELOG) corrective regimen sliding scale   SubCutaneous at bedtime  lidocaine   4% Patch 1 Patch Transdermal daily  meropenem  IVPB 500 milliGRAM(s) IV Intermittent every 24 hours  metoprolol tartrate 100 milliGRAM(s) Oral two times a day  mirtazapine 7.5 milliGRAM(s) Oral at bedtime  pantoprazole    Tablet 40 milliGRAM(s) Oral before breakfast  polyethylene glycol 3350 17 Gram(s) Oral daily  senna 2 Tablet(s) Oral at bedtime  sodium zirconium cyclosilicate 5 Gram(s) Oral daily    MEDICATIONS  (PRN):  acetaminophen     Tablet .. 650 milliGRAM(s) Oral every 6 hours PRN Temp greater or equal to 38C (100.4F), Mild Pain (1 - 3)  dextrose Oral Gel 15 Gram(s) Oral once PRN Blood Glucose LESS THAN 70 milliGRAM(s)/deciliter  midodrine 10 milliGRAM(s) Oral <User Schedule> PRN b/p      ALLERGIES: No Known Drug Allergies  latex (Hives)      FAMILY HISTORY:  FH: myocardial infarction (Father)    FH: myocardial infarction (Father)    Family history of type 2 diabetes mellitus in brother (Sibling)        PHYSICAL EXAMINATION:  -----------------------------  T(C): 36.8 (07-10-23 @ 04:59), Max: 36.8 (07-09-23 @ 11:52)  HR: 93 (07-10-23 @ 04:59) (78 - 98)  BP: 138/83 (07-10-23 @ 04:59) (138/83 - 165/78)  RR: 18 (07-10-23 @ 04:59) (17 - 18)  SpO2: 93% (07-10-23 @ 04:59) (92% - 93%)  Wt(kg): --        VITALS  T(C): 36.8 (07-10-23 @ 04:59), Max: 36.8 (07-09-23 @ 11:52)  HR: 93 (07-10-23 @ 04:59) (78 - 98)  BP: 138/83 (07-10-23 @ 04:59) (138/83 - 165/78)  RR: 18 (07-10-23 @ 04:59) (17 - 18)  SpO2: 93% (07-10-23 @ 04:59) (92% - 93%)    Constitutional: well developed, normal appearance, well groomed, well nourished, no deformities and no acute distress.   Eyes: the conjunctiva exhibited no abnormalities and the eyelids demonstrated no xanthelasmas.   HEENT: normal oral mucosa, no oral pallor and no oral cyanosis.   Neck: normal jugular venous A waves present, normal jugular venous V waves present and no jugular venous wilson A waves.   Pulmonary: no respiratory distress, normal respiratory rhythm and effort, no accessory muscle use and lungs were clear to auscultation bilaterally.   Cardiovascular: heart rate and rhythm were normal, normal S1 and S2 and no murmur, gallop, rub, heave or thrill are present.   Abdomen: soft, non-tender, no hepato-splenomegaly and no abdominal mass palpated.   Musculoskeletal: the gait could not be assessed..   Extremities: no clubbing of the fingernails, no localized cyanosis, no petechial hemorrhages and no ischemic changes.   Skin: normal skin color and pigmentation, no rash, no venous stasis, no skin lesions, no skin ulcer and no xanthoma was observed.   Psychiatric: oriented to person, place, and time, the affect was normal, the mood was normal and not feeling anxious.     LABS:   --------  07-09    133<L>  |  93<L>  |  91<H>  ----------------------------<  327<H>  5.7<H>   |  26  |  6.40<H>    Ca    10.7<H>      09 Jul 2023 07:07  Mg     2.8     07-09                           12.3   18.01 )-----------( 461      ( 09 Jul 2023 07:07 )             37.9                 RADIOLOGY:  -----------------    ECG:     ECHO:

## 2023-07-10 NOTE — PROGRESS NOTE ADULT - SUBJECTIVE AND OBJECTIVE BOX
CAPILLARY BLOOD GLUCOSE      POCT Blood Glucose.: 331 mg/dL (10 Jul 2023 08:14)  POCT Blood Glucose.: 242 mg/dL (09 Jul 2023 22:16)  POCT Blood Glucose.: 243 mg/dL (09 Jul 2023 17:29)  POCT Blood Glucose.: 263 mg/dL (09 Jul 2023 12:19)      Vital Signs Last 24 Hrs  T(C): 36.8 (10 Jul 2023 04:59), Max: 36.8 (09 Jul 2023 11:52)  T(F): 98.2 (10 Jul 2023 04:59), Max: 98.2 (09 Jul 2023 11:52)  HR: 93 (10 Jul 2023 04:59) (78 - 98)  BP: 138/83 (10 Jul 2023 04:59) (138/83 - 165/78)  BP(mean): --  RR: 18 (10 Jul 2023 04:59) (17 - 18)  SpO2: 93% (10 Jul 2023 04:59) (92% - 93%)    Parameters below as of 10 Jul 2023 04:59  Patient On (Oxygen Delivery Method): room air        General: WN/WD NAD  Respiratory: CTA B/L  CV: RRR, S1S2, no murmurs, rubs or gallops  Abdominal: Soft, NT, ND +BS, Last BM  Extremities: No edema, + peripheral pulses     07-09    133<L>  |  93<L>  |  91<H>  ----------------------------<  327<H>  5.7<H>   |  26  |  6.40<H>    Ca    10.7<H>      09 Jul 2023 07:07  Mg     2.8     07-09        dextrose 50% Injectable 12.5 Gram(s) IV Push once  dextrose 50% Injectable 25 Gram(s) IV Push once  dextrose 50% Injectable 25 Gram(s) IV Push once  dextrose Oral Gel 15 Gram(s) Oral once PRN  glucagon  Injectable 1 milliGRAM(s) IntraMuscular once  insulin glargine Injectable (LANTUS) 8 Unit(s) SubCutaneous at bedtime  insulin lispro (ADMELOG) corrective regimen sliding scale   SubCutaneous three times a day before meals  insulin lispro (ADMELOG) corrective regimen sliding scale   SubCutaneous at bedtime

## 2023-07-11 LAB
ALBUMIN SERPL ELPH-MCNC: 2.5 G/DL — LOW (ref 3.3–5)
ALP SERPL-CCNC: 123 U/L — HIGH (ref 40–120)
ALT FLD-CCNC: 22 U/L — SIGNIFICANT CHANGE UP (ref 12–78)
AMMONIA BLD-MCNC: 32 UMOL/L — SIGNIFICANT CHANGE UP (ref 11–32)
ANION GAP SERPL CALC-SCNC: 19 MMOL/L — HIGH (ref 5–17)
ANION GAP SERPL CALC-SCNC: 19 MMOL/L — HIGH (ref 5–17)
APTT BLD: 21.3 SEC — LOW (ref 27.5–35.5)
AST SERPL-CCNC: 37 U/L — SIGNIFICANT CHANGE UP (ref 15–37)
BASE EXCESS BLDA CALC-SCNC: -1.7 MMOL/L — SIGNIFICANT CHANGE UP (ref -2–3)
BILIRUB SERPL-MCNC: 0.5 MG/DL — SIGNIFICANT CHANGE UP (ref 0.2–1.2)
BLOOD GAS COMMENTS ARTERIAL: SIGNIFICANT CHANGE UP
BUN SERPL-MCNC: 137 MG/DL — HIGH (ref 7–23)
BUN SERPL-MCNC: 96 MG/DL — HIGH (ref 7–23)
CALCIUM SERPL-MCNC: 10.2 MG/DL — HIGH (ref 8.5–10.1)
CALCIUM SERPL-MCNC: 9.3 MG/DL — SIGNIFICANT CHANGE UP (ref 8.5–10.1)
CHLORIDE SERPL-SCNC: 94 MMOL/L — LOW (ref 96–108)
CHLORIDE SERPL-SCNC: 97 MMOL/L — SIGNIFICANT CHANGE UP (ref 96–108)
CK MB BLD-MCNC: 2.9 % — SIGNIFICANT CHANGE UP (ref 0–3.5)
CK MB CFR SERPL CALC: 12.7 NG/ML — HIGH (ref 0–3.6)
CK SERPL-CCNC: 439 U/L — HIGH (ref 26–192)
CO2 SERPL-SCNC: 19 MMOL/L — LOW (ref 22–31)
CO2 SERPL-SCNC: 22 MMOL/L — SIGNIFICANT CHANGE UP (ref 22–31)
CREAT SERPL-MCNC: 6.6 MG/DL — HIGH (ref 0.5–1.3)
CREAT SERPL-MCNC: 9.3 MG/DL — HIGH (ref 0.5–1.3)
EGFR: 4 ML/MIN/1.73M2 — LOW
EGFR: 6 ML/MIN/1.73M2 — LOW
GAS PNL BLDA: SIGNIFICANT CHANGE UP
GLUCOSE SERPL-MCNC: 256 MG/DL — HIGH (ref 70–99)
GLUCOSE SERPL-MCNC: 316 MG/DL — HIGH (ref 70–99)
HCO3 BLDA-SCNC: 23 MMOL/L — SIGNIFICANT CHANGE UP (ref 21–28)
HCT VFR BLD CALC: 33.8 % — LOW (ref 34.5–45)
HCT VFR BLD CALC: 38.5 % — SIGNIFICANT CHANGE UP (ref 34.5–45)
HGB BLD-MCNC: 11.2 G/DL — LOW (ref 11.5–15.5)
HGB BLD-MCNC: 12.4 G/DL — SIGNIFICANT CHANGE UP (ref 11.5–15.5)
HOROWITZ INDEX BLDA+IHG-RTO: 32 — SIGNIFICANT CHANGE UP
INR BLD: 1.2 RATIO — HIGH (ref 0.88–1.16)
LACTATE SERPL-SCNC: 0.7 MMOL/L — SIGNIFICANT CHANGE UP (ref 0.7–2)
MAGNESIUM SERPL-MCNC: 3.1 MG/DL — HIGH (ref 1.6–2.6)
MCHC RBC-ENTMCNC: 31 PG — SIGNIFICANT CHANGE UP (ref 27–34)
MCHC RBC-ENTMCNC: 31.3 PG — SIGNIFICANT CHANGE UP (ref 27–34)
MCHC RBC-ENTMCNC: 32.2 GM/DL — SIGNIFICANT CHANGE UP (ref 32–36)
MCHC RBC-ENTMCNC: 33.1 GM/DL — SIGNIFICANT CHANGE UP (ref 32–36)
MCV RBC AUTO: 94.4 FL — SIGNIFICANT CHANGE UP (ref 80–100)
MCV RBC AUTO: 96.3 FL — SIGNIFICANT CHANGE UP (ref 80–100)
NRBC # BLD: 0 /100 WBCS — SIGNIFICANT CHANGE UP (ref 0–0)
NRBC # BLD: SIGNIFICANT CHANGE UP /100 WBCS (ref 0–0)
NT-PROBNP SERPL-SCNC: HIGH PG/ML (ref 0–125)
PCO2 BLDA: 34 MMHG — SIGNIFICANT CHANGE UP (ref 32–35)
PH BLDA: 7.43 — SIGNIFICANT CHANGE UP (ref 7.35–7.45)
PLATELET # BLD AUTO: 490 K/UL — HIGH (ref 150–400)
PLATELET # BLD AUTO: 520 K/UL — HIGH (ref 150–400)
PO2 BLDA: 182 MMHG — HIGH (ref 83–108)
POTASSIUM SERPL-MCNC: 4 MMOL/L — SIGNIFICANT CHANGE UP (ref 3.5–5.3)
POTASSIUM SERPL-MCNC: 5.6 MMOL/L — HIGH (ref 3.5–5.3)
POTASSIUM SERPL-SCNC: 4 MMOL/L — SIGNIFICANT CHANGE UP (ref 3.5–5.3)
POTASSIUM SERPL-SCNC: 5.6 MMOL/L — HIGH (ref 3.5–5.3)
PROT SERPL-MCNC: 7.3 G/DL — SIGNIFICANT CHANGE UP (ref 6–8.3)
PROTHROM AB SERPL-ACNC: 14.1 SEC — HIGH (ref 10.5–13.4)
RBC # BLD: 3.58 M/UL — LOW (ref 3.8–5.2)
RBC # BLD: 4 M/UL — SIGNIFICANT CHANGE UP (ref 3.8–5.2)
RBC # FLD: 16 % — HIGH (ref 10.3–14.5)
RBC # FLD: 16.3 % — HIGH (ref 10.3–14.5)
SAO2 % BLDA: 100 % — HIGH (ref 94–98)
SODIUM SERPL-SCNC: 135 MMOL/L — SIGNIFICANT CHANGE UP (ref 135–145)
SODIUM SERPL-SCNC: 135 MMOL/L — SIGNIFICANT CHANGE UP (ref 135–145)
TROPONIN I, HIGH SENSITIVITY RESULT: 94.5 NG/L — HIGH
TROPONIN I, HIGH SENSITIVITY RESULT: 96.9 NG/L — HIGH
WBC # BLD: 19.09 K/UL — HIGH (ref 3.8–10.5)
WBC # BLD: 26.51 K/UL — HIGH (ref 3.8–10.5)
WBC # FLD AUTO: 19.09 K/UL — HIGH (ref 3.8–10.5)
WBC # FLD AUTO: 26.51 K/UL — HIGH (ref 3.8–10.5)

## 2023-07-11 PROCEDURE — 93010 ELECTROCARDIOGRAM REPORT: CPT | Mod: 76

## 2023-07-11 PROCEDURE — 70450 CT HEAD/BRAIN W/O DYE: CPT | Mod: 26

## 2023-07-11 PROCEDURE — 99233 SBSQ HOSP IP/OBS HIGH 50: CPT

## 2023-07-11 PROCEDURE — 70496 CT ANGIOGRAPHY HEAD: CPT | Mod: 26

## 2023-07-11 PROCEDURE — 0042T: CPT

## 2023-07-11 PROCEDURE — 70498 CT ANGIOGRAPHY NECK: CPT | Mod: 26

## 2023-07-11 PROCEDURE — 73701 CT LOWER EXTREMITY W/DYE: CPT | Mod: 26,RT

## 2023-07-11 PROCEDURE — 70450 CT HEAD/BRAIN W/O DYE: CPT | Mod: 26,77,59

## 2023-07-11 PROCEDURE — 71045 X-RAY EXAM CHEST 1 VIEW: CPT | Mod: 26

## 2023-07-11 PROCEDURE — 71045 X-RAY EXAM CHEST 1 VIEW: CPT | Mod: 26,77

## 2023-07-11 RX ORDER — DRONABINOL 2.5 MG
2.5 CAPSULE ORAL
Refills: 0 | Status: DISCONTINUED | OUTPATIENT
Start: 2023-07-11 | End: 2023-07-17

## 2023-07-11 RX ORDER — INSULIN LISPRO 100/ML
VIAL (ML) SUBCUTANEOUS EVERY 6 HOURS
Refills: 0 | Status: DISCONTINUED | OUTPATIENT
Start: 2023-07-11 | End: 2023-07-13

## 2023-07-11 RX ORDER — CHLORHEXIDINE GLUCONATE 213 G/1000ML
1 SOLUTION TOPICAL
Refills: 0 | Status: DISCONTINUED | OUTPATIENT
Start: 2023-07-11 | End: 2023-07-14

## 2023-07-11 RX ORDER — LEVETIRACETAM 250 MG/1
1000 TABLET, FILM COATED ORAL ONCE
Refills: 0 | Status: COMPLETED | OUTPATIENT
Start: 2023-07-11 | End: 2023-07-11

## 2023-07-11 RX ORDER — CEFTOLOZANE AND TAZOBACTAM 1; .5 G/10ML; G/10ML
150 INJECTION, POWDER, LYOPHILIZED, FOR SOLUTION INTRAVENOUS EVERY 8 HOURS
Refills: 0 | Status: COMPLETED | OUTPATIENT
Start: 2023-07-12 | End: 2023-07-12

## 2023-07-11 RX ORDER — VANCOMYCIN HCL 1 G
750 VIAL (EA) INTRAVENOUS ONCE
Refills: 0 | Status: COMPLETED | OUTPATIENT
Start: 2023-07-11 | End: 2023-07-11

## 2023-07-11 RX ORDER — CEFTOLOZANE AND TAZOBACTAM 1; .5 G/10ML; G/10ML
750 INJECTION, POWDER, LYOPHILIZED, FOR SOLUTION INTRAVENOUS ONCE
Refills: 0 | Status: COMPLETED | OUTPATIENT
Start: 2023-07-11 | End: 2023-07-11

## 2023-07-11 RX ORDER — INSULIN GLARGINE 100 [IU]/ML
10 INJECTION, SOLUTION SUBCUTANEOUS AT BEDTIME
Refills: 0 | Status: DISCONTINUED | OUTPATIENT
Start: 2023-07-11 | End: 2023-07-12

## 2023-07-11 RX ADMIN — MIRTAZAPINE 7.5 MILLIGRAM(S): 45 TABLET, ORALLY DISINTEGRATING ORAL at 23:07

## 2023-07-11 RX ADMIN — Medication 60 MILLIGRAM(S): at 14:40

## 2023-07-11 RX ADMIN — LEVETIRACETAM 440 MILLIGRAM(S): 250 TABLET, FILM COATED ORAL at 23:07

## 2023-07-11 RX ADMIN — SENNA PLUS 2 TABLET(S): 8.6 TABLET ORAL at 22:22

## 2023-07-11 RX ADMIN — LIDOCAINE 1 PATCH: 4 CREAM TOPICAL at 11:14

## 2023-07-11 RX ADMIN — Medication 2.5 MILLIGRAM(S): at 22:46

## 2023-07-11 RX ADMIN — Medication 100 MILLIGRAM(S): at 22:45

## 2023-07-11 RX ADMIN — Medication 0.1 MILLIGRAM(S): at 05:12

## 2023-07-11 RX ADMIN — SODIUM ZIRCONIUM CYCLOSILICATE 5 GRAM(S): 10 POWDER, FOR SUSPENSION ORAL at 11:14

## 2023-07-11 RX ADMIN — Medication 650 MILLIGRAM(S): at 00:57

## 2023-07-11 RX ADMIN — Medication 60 MILLIGRAM(S): at 05:10

## 2023-07-11 RX ADMIN — Medication 8: at 23:38

## 2023-07-11 RX ADMIN — Medication 650 MILLIGRAM(S): at 01:27

## 2023-07-11 RX ADMIN — LIDOCAINE 1 PATCH: 4 CREAM TOPICAL at 22:31

## 2023-07-11 RX ADMIN — Medication 100 MILLIGRAM(S): at 05:10

## 2023-07-11 RX ADMIN — Medication 6: at 08:56

## 2023-07-11 RX ADMIN — Medication 50 MILLIGRAM(S): at 11:14

## 2023-07-11 RX ADMIN — Medication 250 MILLIGRAM(S): at 23:40

## 2023-07-11 RX ADMIN — Medication 81 MILLIGRAM(S): at 11:14

## 2023-07-11 RX ADMIN — Medication 2.5 MILLIGRAM(S): at 09:57

## 2023-07-11 RX ADMIN — Medication 0.1 MILLIGRAM(S): at 22:46

## 2023-07-11 RX ADMIN — Medication 60 MILLIGRAM(S): at 22:21

## 2023-07-11 RX ADMIN — POLYETHYLENE GLYCOL 3350 17 GRAM(S): 17 POWDER, FOR SOLUTION ORAL at 11:14

## 2023-07-11 RX ADMIN — PANTOPRAZOLE SODIUM 40 MILLIGRAM(S): 20 TABLET, DELAYED RELEASE ORAL at 05:09

## 2023-07-11 RX ADMIN — Medication 1 DROP(S): at 22:22

## 2023-07-11 RX ADMIN — INSULIN GLARGINE 10 UNIT(S): 100 INJECTION, SOLUTION SUBCUTANEOUS at 22:44

## 2023-07-11 RX ADMIN — Medication 8: at 12:58

## 2023-07-11 NOTE — PROGRESS NOTE ADULT - ASSESSMENT
72-year-old female with significant past medical history for hypertension, diabetes, dementia, end-stage renal disease on hemodialysis presents to the ED status post unwitnessed fall from [facility].  Patient states that she heard some voices then rolled out of bed.  Patient complaining of right hip pain.  Unknown head trauma.  + Eliquis < from: Xray Femur showed  Fractures including the medial wall of the acetabulum and inferior right pubic ramus Admitted for pain management ,PT/OT

## 2023-07-11 NOTE — PROCEDURE NOTE - NSPROCDETAILS_GEN_ALL_CORE
Ultrasound used to identify vessel and cannulate/location identified, draped/prepped, sterile technique used/blood seen on insertion/dressing applied/flushes easily/secured in place/sterile technique, catheter placed

## 2023-07-11 NOTE — CHART NOTE - NSCHARTNOTEFT_GEN_A_CORE
Rapid Response Note:    Rapid response was called at 16:56 for unresponsiveness during dialysis     Events leading up to Rapid Response:     Patient was seen and examined at the bedside by the rapid response team. ICU PA and Gladys NP at bedside. Rapid called for unresponsiveness during dialysis session. Brief episode. Patient become  responsive, at baseline AAOx1 , able to response to few questions with yes/ no. Noticed L arm weakness, not present before. Patient here s/p fall. Has ESRD on HD, BP stable, 30 mins session remaining       Rapid Response Vital Signs:   BP: 141/72  HR: 77  RR: 14  SpO2: 91 % on  RA  Temp: 99F  FS: 229    T(C): 36.6 (07-11-23 @ 12:33), Max: 36.8 (07-11-23 @ 05:07)  HR: 80 (07-11-23 @ 14:43) (80 - 90)  BP: 132/70 (07-11-23 @ 14:43) (104/81 - 137/74)  RR: 17 (07-11-23 @ 12:33) (16 - 18)  SpO2: 95% (07-11-23 @ 12:33) (95% - 97%)    Physical Exam:   Gen: weak, frail appearing female  HEENT: NCAT, PEERLA b/l, EOMI b/l   Cardio: regular rate and rhythm, +s1s2, no murmurs, rubs, or gallops   Pulm: CTA b/l, no wheezes, rales or rhonchi   Abdomen: soft, nontender, nondistended, +BS x4 quadrants, no guarding   Extremities: no cyanosis or edema, +2 pedal pulses   Neuro: AAOx1 at baseline, LUE weakness noticed , no sensory loss.   Skin: warm and dry       Assessment/Plan:  72-year-old female with significant past medical history for hypertension, diabetes, dementia, end-stage renal disease on hemodialysis presents to the ED status post unwitnessed fall from [facility].  Patient states that she heard some voices then rolled out of bed.  Patient complaining of right hip pain.  Unknown head trauma.  + Eliquis < from: Xray Femur showed  Fractures including the medial wall of the acetabulum and inferior right pubic ramus Admitted for pain management ,PT/OT  Rapid response called for brief episode of unresponsiveness during dialysis session and new onset LUE weakness.  - Brief episode of unresponsiveness 2/2 hypoperfusion due to HD session    - For new onset LUE weakness , stroke work up order  - CT angio head / neck r/o stroke   - CT brain stroke protocol   - CXR STAT   - Patient is on HD ESRD , Per ICU and Gladys CORONADO ok to give contrast IV for scan   -RN to call if any changes.   -Discussed with Dr. Blackwell , agrees with above plan   -Family updated by gladys CORONADO

## 2023-07-11 NOTE — PROVIDER CONTACT NOTE (CRITICAL VALUE NOTIFICATION) - TEST AND RESULT REPORTED:
Troponin high sensitivity 96.9
>100,000 pseudomonas aeruginosa 50,000-99,000 Klebsiella pneumoniae ESBL

## 2023-07-11 NOTE — PROGRESS NOTE ADULT - ASSESSMENT
72-year-old female former smoker quit 3/2012  with significant past medical history for hypertension, CAD sp cabg  PVD  diabetes, dementia, end-stage renal disease on hemodialysis   a fib on eliquis sp recent hosp stay 5/17-5/24/2023  admitted 3/7-3/11/2023 and before that 2/22-2/25/2023   . During 5/2023 admission she had   . ENTERORHINOVIRUS INFECTION RESP TRACT 5/17  . PLEURAL EFFUSION SP l THORA 5/18 TRANSUDATE   Patient now  presented to the ED 7/1/2023 status post unwitnessed fall from [facility].  Patient states that she heard some voices then rolled out of bed.  Patient complaining of right hip pain.  Unknown head trauma.  + Ashleedeangelo     She was found to have acetabular fracture which Ortho was contacted and they plan non operative management Pt was admitted to [facility] for bleed as she was on apixaban and was noted to have pl effsn    . 7/1/2023  I was asked to admit pt                      (01 Jul 2023 13:32)      esrd on hd tues thurs sat     ANEMIA PLAN:  Anemia of chronic disease:  We will continue epo aiming for a HCT of 32-36 %.   We will monitor Iron stores, B12 and RBC folate .      BP monitoring,continue current antihypertensive meds, low salt diet,followup with PMD in 1-2 weeks      Accuchecks monitoring and insulin sliding scale coverage, no concentrated sweets diet, serial labs and f/up with PMD, monitor HB A 1 C every 3-4 mnth

## 2023-07-11 NOTE — PROGRESS NOTE ADULT - SUBJECTIVE AND OBJECTIVE BOX
Patient is a 72y Female with a known history of :  Hip fracture [S72.009A]    Closed pelvic fracture [S32.9XXA]    Pubic ramus fracture [S32.599A]    Right acetabular fracture [S32.401A]    ESRD on dialysis [N18.6]    Prophylactic measure [Z29.9]    HTN (hypertension) [I10]    Fall [W19.XXXA]    Pleural effusion [J90]    Acute febrile illness [R50.9]    Type 2 diabetes mellitus [E11.9]      HPI:  72-year-old female with significant past medical history for hypertension, diabetes, dementia, end-stage renal disease on hemodialysis presents to the ED status post unwitnessed fall from [facility].  Patient states that she heard some voices then rolled out of bed.  Patient complaining of right hip pain.  Unknown head trauma.  + Eliquis < from: Xray Femur showed  Fractures including the medial wall of the acetabulum and inferior right pubic ramus Admitted for pain management ,PT/OT        (01 Jul 2023 15:24)      REVIEW OF SYSTEMS:    CONSTITUTIONAL: No fever, weight loss, or fatigue  EYES: No eye pain, visual disturbances, or discharge  ENMT:  No difficulty hearing, tinnitus, vertigo; No sinus or throat pain  NECK: No pain or stiffness  BREASTS: No pain, masses, or nipple discharge  RESPIRATORY: No cough, wheezing, chills or hemoptysis; No shortness of breath  CARDIOVASCULAR: No chest pain, palpitations, dizziness, or leg swelling  GASTROINTESTINAL: No abdominal or epigastric pain. No nausea, vomiting, or hematemesis; No diarrhea or constipation. No melena or hematochezia.  GENITOURINARY: No dysuria, frequency, hematuria, or incontinence  NEUROLOGICAL: No headaches, memory loss, loss of strength, numbness, or tremors  SKIN: No itching, burning, rashes, or lesions   LYMPH NODES: No enlarged glands  ENDOCRINE: No heat or cold intolerance; No hair loss  MUSCULOSKELETAL: No joint pain or swelling; No muscle, back, or extremity pain  PSYCHIATRIC: No depression, anxiety, mood swings, or difficulty sleeping  HEME/LYMPH: No easy bruising, or bleeding gums  ALLERGY AND IMMUNOLOGIC: No hives or eczema    MEDICATIONS  (STANDING):  aspirin enteric coated 81 milliGRAM(s) Oral daily  cloNIDine 0.1 milliGRAM(s) Oral two times a day  dextrose 5%. 1000 milliLiter(s) (100 mL/Hr) IV Continuous <Continuous>  dextrose 5%. 1000 milliLiter(s) (50 mL/Hr) IV Continuous <Continuous>  dextrose 50% Injectable 12.5 Gram(s) IV Push once  dextrose 50% Injectable 25 Gram(s) IV Push once  dextrose 50% Injectable 25 Gram(s) IV Push once  diltiazem    Tablet 60 milliGRAM(s) Oral three times a day  glucagon  Injectable 1 milliGRAM(s) IntraMuscular once  imipramine 50 milliGRAM(s) Oral daily  insulin glargine Injectable (LANTUS) 15 Unit(s) SubCutaneous at bedtime  insulin lispro (ADMELOG) corrective regimen sliding scale   SubCutaneous three times a day before meals  insulin lispro (ADMELOG) corrective regimen sliding scale   SubCutaneous at bedtime  lidocaine   4% Patch 1 Patch Transdermal daily  metoprolol tartrate 100 milliGRAM(s) Oral two times a day  mirtazapine 7.5 milliGRAM(s) Oral at bedtime  pantoprazole    Tablet 40 milliGRAM(s) Oral before breakfast  polyethylene glycol 3350 17 Gram(s) Oral daily  senna 2 Tablet(s) Oral at bedtime  sodium zirconium cyclosilicate 5 Gram(s) Oral daily    MEDICATIONS  (PRN):  acetaminophen     Tablet .. 650 milliGRAM(s) Oral every 6 hours PRN Temp greater or equal to 38C (100.4F), Mild Pain (1 - 3)  dextrose Oral Gel 15 Gram(s) Oral once PRN Blood Glucose LESS THAN 70 milliGRAM(s)/deciliter  midodrine 10 milliGRAM(s) Oral <User Schedule> PRN b/p      ALLERGIES: No Known Drug Allergies  latex (Hives)      FAMILY HISTORY:  FH: myocardial infarction (Father)    FH: myocardial infarction (Father)    Family history of type 2 diabetes mellitus in brother (Sibling)        PHYSICAL EXAMINATION:  -----------------------------  T(C): 36.8 (07-11-23 @ 05:07), Max: 36.8 (07-11-23 @ 05:07)  HR: 83 (07-11-23 @ 05:07) (80 - 92)  BP: 134/77 (07-11-23 @ 05:07) (104/81 - 159/84)  RR: 16 (07-11-23 @ 05:07) (16 - 18)  SpO2: 97% (07-11-23 @ 05:07) (95% - 97%)  Wt(kg): --        VITALS  T(C): 36.8 (07-11-23 @ 05:07), Max: 36.8 (07-11-23 @ 05:07)  HR: 83 (07-11-23 @ 05:07) (80 - 92)  BP: 134/77 (07-11-23 @ 05:07) (104/81 - 159/84)  RR: 16 (07-11-23 @ 05:07) (16 - 18)  SpO2: 97% (07-11-23 @ 05:07) (95% - 97%)    Constitutional: well developed, normal appearance, well groomed, well nourished, no deformities and no acute distress.   Eyes: the conjunctiva exhibited no abnormalities and the eyelids demonstrated no xanthelasmas.   HEENT: normal oral mucosa, no oral pallor and no oral cyanosis.   Neck: normal jugular venous A waves present, normal jugular venous V waves present and no jugular venous wilson A waves.   Pulmonary: no respiratory distress, normal respiratory rhythm and effort, no accessory muscle use and lungs were clear to auscultation bilaterally.   Cardiovascular: heart rate and rhythm were normal, normal S1 and S2 and no murmur, gallop, rub, heave or thrill are present.   Abdomen: soft, non-tender, no hepato-splenomegaly and no abdominal mass palpated.   Musculoskeletal: the gait could not be assessed..   Extremities: no clubbing of the fingernails, no localized cyanosis, no petechial hemorrhages and no ischemic changes.   Skin: normal skin color and pigmentation, no rash, no venous stasis, no skin lesions, no skin ulcer and no xanthoma was observed.   Psychiatric: oriented to person, place, and time, the affect was normal, the mood was normal and not feeling anxious.     LABS:   --------  07-10    133<L>  |  96  |  113<H>  ----------------------------<  342<H>  5.9<H>   |  23  |  8.10<H>    Ca    10.4<H>      10 Jul 2023 09:00  Mg     2.9     07-10                           11.5   19.81 )-----------( 465      ( 10 Jul 2023 09:00 )             36.7                 RADIOLOGY:  -----------------    ECG:     ECHO:

## 2023-07-11 NOTE — PROGRESS NOTE ADULT - SUBJECTIVE AND OBJECTIVE BOX
[facility]  INFECTIOUS DISEASES - [address], [place] [address]  Tel: [phone]     Fax: [phone]  =======================================================    SHILO KOHLER [id number]    Follow up: No fevers. Seen earlier today. Denied any pain. No reported diarrhea. Later in the day developed unresponsiveness during HD, found to have hemorrhage in L parietal lobe on CT.    Allergies:  No Known Drug Allergies  latex (Hives)      Antibiotics:  acetaminophen     Tablet .. 650 milliGRAM(s) Oral every 6 hours PRN  aspirin enteric coated 81 milliGRAM(s) Oral daily  cloNIDine 0.1 milliGRAM(s) Oral two times a day  dextrose 5%. 1000 milliLiter(s) IV Continuous <Continuous>  dextrose 5%. 1000 milliLiter(s) IV Continuous <Continuous>  dextrose 50% Injectable 12.5 Gram(s) IV Push once  dextrose 50% Injectable 25 Gram(s) IV Push once  dextrose 50% Injectable 25 Gram(s) IV Push once  dextrose Oral Gel 15 Gram(s) Oral once PRN  diltiazem    Tablet 60 milliGRAM(s) Oral three times a day  dronabinol 2.5 milliGRAM(s) Oral two times a day before meals  glucagon  Injectable 1 milliGRAM(s) IntraMuscular once  imipramine 50 milliGRAM(s) Oral daily  insulin glargine Injectable (LANTUS) 15 Unit(s) SubCutaneous at bedtime  insulin lispro (ADMELOG) corrective regimen sliding scale   SubCutaneous at bedtime  insulin lispro (ADMELOG) corrective regimen sliding scale   SubCutaneous three times a day before meals  lidocaine   4% Patch 1 Patch Transdermal daily  metoprolol tartrate 100 milliGRAM(s) Oral two times a day  midodrine 10 milliGRAM(s) Oral <User Schedule> PRN  mirtazapine 7.5 milliGRAM(s) Oral at bedtime  pantoprazole    Tablet 40 milliGRAM(s) Oral before breakfast  polyethylene glycol 3350 17 Gram(s) Oral daily  senna 2 Tablet(s) Oral at bedtime  sodium zirconium cyclosilicate 5 Gram(s) Oral daily       REVIEW OF SYSTEMS:  unable to obtain 2/2 dementia     Physical Exam:  ICU Vital Signs Last 24 Hrs  T(C): 36.6 (11 Jul 2023 12:33), Max: 36.8 (11 Jul 2023 05:07)  T(F): 97.9 (11 Jul 2023 12:33), Max: 98.3 (11 Jul 2023 05:07)  HR: 80 (11 Jul 2023 14:43) (80 - 89)  BP: 132/70 (11 Jul 2023 14:43) (128/81 - 137/74)  BP(mean): --  ABP: --  ABP(mean): --  RR: 17 (11 Jul 2023 12:33) (16 - 18)  SpO2: 95% (11 Jul 2023 12:33) (95% - 97%)    O2 Parameters below as of 11 Jul 2023 12:33  Patient On (Oxygen Delivery Method): room air           GEN: NAD  HEENT: normocephalic and atraumatic.  NECK: Supple.  No lymphadenopathy   LUNGS: Normal respiratory effort  HEART: Regular rate and rhythm   ABDOMEN: Soft, nontender, and nondistended.    EXTREMITIES: No leg edema.   NEUROLOGIC: AO x 1  PSYCHIATRIC: Appropriate affect .  SKIN: stage 2 ulcer on coccyx--no drainage, odor, or surrounding tenderness    Labs:  07-11    135  |  97  |  137<H>  ----------------------------<  316<H>  5.6<H>   |  19<L>  |  9.30<H>    Ca    10.2<H>      11 Jul 2023 08:33  Mg     3.1     07-11                            11.2   26.51 )-----------( 520      ( 11 Jul 2023 19:12 )             33.8       Urinalysis Basic - ( 11 Jul 2023 08:33 )    Color: x / Appearance: x / SG: x / pH: x  Gluc: 316 mg/dL / Ketone: x  / Bili: x / Urobili: x   Blood: x / Protein: x / Nitrite: x   Leuk Esterase: x / RBC: x / WBC x   Sq Epi: x / Non Sq Epi: x / Bacteria: x          RECENT CULTURES:  07-03 @ 21:20 Clean Catch Clean Catch (Midstream) Pseudomonas aeruginosa (Carbapenem Resistant)  Klebsiella pneumoniae ESBL    >100,000 CFU/ml Pseudomonas aeruginosa (Carbapenem Resistant)  50,000 - 99,000 CFU/mL Klebsiella pneumoniae ESBL        07-03 @ 16:10 .Blood Blood     No growth at 5 days        07-03 @ 16:05 .Blood Blood     No growth at 5 days              All imaging and data are reviewed.     < from: CT Hip w/ IV Cont, Right (07.11.23 @ 12:24) >  FINDINGS:    Subacute comminuted dual column right acetabular fractureincluding mild   to moderate displacement and impaction of osteochondral fragments arising   from the medial wall and roof resulting in ill-defined approximately 2.1   x 3.4 cm region of articular surface discontinuity and 0.7 cm   intra-articular cortical step-off along the posterior half of the   weightbearing surface. No solid internal or external osseous bridging of   the dominant subchondral fragments at this time.    Subacute mildly displaced/impacted oblique transverse fracture through   the superior pubic ramus at its junction with intra-articular extension,   but without significant cortical step-off along the anterior acetabular   column. Subacute mildly displaced/impacted oblique transverse fracture at   the junction of the inferior pubic and ischial rami. Sacroiliac joints   and pubic symphysis are maintained with mild arthrosis. Bilateral femoral   heads are well located and adequately covered. Mild to moderate right   worse than left hip arthrosis. Mineralization within normal limits. No   aggressive osseous neoplasm.    Soft tissues: Soft tissue swelling surrounding right acetabulum most   prominent in the region of the iliacus muscle body, but without discrete   sizable hyperattenuating hematoma or drainable encapsulated fluid   collection. Small volume right hip joint effusion. No pelvic free fluid.   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    IMPRESSION:    1.  Subacute mildly to moderately displaced/impacted dual column right   acetabular fracture resulting in approximately 2.1 x 3.4 cm region of   significant discontinuity/cortical step-off along the posterior half of   the weightbearing articular surface.  2.  No sizable hematoma or drainable encapsulated fluid collection.    < end of copied text >

## 2023-07-11 NOTE — CONSULT NOTE ADULT - ASSESSMENT
72-year-old female with significant past medical history for hypertension, diabetes, dementia, end-stage renal disease on hemodialysis, pAfib on eliquis a/w R superior/inferior rami fracture and R comminuted acetabular fracture; medically managed.  Course complicated by suspected L parietal hemorrhage.  Admit to ICU for q1h neurochecks.    Intracerebral hemorrhage  Altered mental status  ESRD on HD  R superior/inferior rami fracture and R comminuted acetabular fracture   Afib  CRE Pseudomonas UTI    #Neuro  Alert, confused; this is her baseline  Suspected L parietal hemorrhage, although study confounded by prior contrast administration  Altered mental status possibly due to seizure like activity  Neuro/Neurosurgery without acute interventions  Levetiracetam  Will need transfer to tertiary hospital for continuous EEG monitoring     #CV  pAfib on eliquis; hold for now  Hemodynamically stable  C/w oral antihypertensives   Goal SBP less than 140    #Pulm  - Supplemental oxygen to maintain saturations greater than 92%  -Aggressive chest PT and suctioning  - Aspiration precautions    #GI  - Stress ulcer prophylaxis with pantoprazole  - NPO except medications for now    #Renal  - ESRD on HD  - Strict I&Os    #ID  - CRE Pseudomonas UTI on zerbaxa   - also on vanc by level by primary team  - Will continue for now  - ID following  - Trend WBC and monitor for fever    #Heme  - DVT prophylaxis with SCDs  - Monitor for s/s of bleeding    #Endo  - POCT of glucose to maintain BG < 180 mg/dL  - Correctional insulin sliding scale q6h while NPO    #Dispo  - critically ill requiring ICU level of care  - Case discussed with EICU attending 72-year-old female with significant past medical history for hypertension, diabetes, dementia, end-stage renal disease on hemodialysis, pAfib on eliquis a/w R superior/inferior rami fracture and R comminuted acetabular fracture; medically managed.  Course complicated by suspected L parietal hemorrhage.  Admit to ICU for q1h neurochecks.    Intracerebral hemorrhage  Altered mental status  ESRD on HD  R superior/inferior rami fracture and R comminuted acetabular fracture   Afib  CRE Pseudomonas UTI    #Neuro  Alert, confused; this is her baseline  Suspected L parietal hemorrhage, although study confounded by prior contrast administration  Altered mental status possibly due to seizure like activity  Neuro/Neurosurgery without acute interventions  Levetiracetam  Will need transfer to tertiary hospital for continuous EEG monitoring     #CV  pAfib on eliquis; hold for now  Hemodynamically stable  C/w oral antihypertensives   Goal SBP less than 140    #Pulm  - Supplemental oxygen to maintain saturations greater than 92%  -Aggressive chest PT and suctioning  - Aspiration precautions    #GI  - Stress ulcer prophylaxis with pantoprazole  - NPO except medications for now    #Renal  - ESRD on HD  - Strict I&Os    #ID  - CRE Pseudomonas UTI on zerbaxa   - also on vanc by level by primary team  - Will continue for now  - ID following  - Trend WBC and monitor for fever    #Heme  - DVT prophylaxis with SCDs  - Monitor for s/s of bleeding    #Endo  - POCT of glucose to maintain BG < 180 mg/dL  - Correctional insulin sliding scale q6h while NPO    #Dispo  - critically ill requiring ICU level of care.  DNR/I.  - Case discussed with EICU attending

## 2023-07-11 NOTE — SOCIAL WORK PROGRESS NOTE - NSSWPROGRESSNOTE_GEN_ALL_CORE
Pt discussed during rounds, pt remains acute at this time. SW will continue to follow for appropriate dc back to [facility].

## 2023-07-11 NOTE — PROGRESS NOTE ADULT - SUBJECTIVE AND OBJECTIVE BOX
Date of Service: 07-11-23 @ 14:35    Patient is a 72y old  Female who presents with a chief complaint of s/p fall (11 Jul 2023 11:28)      INTERVAL HPI/OVERNIGHT EVENTS: Patient seen and examined. NAD. No complaints.    Vital Signs Last 24 Hrs  T(C): 36.6 (11 Jul 2023 12:33), Max: 36.8 (11 Jul 2023 05:07)  T(F): 97.9 (11 Jul 2023 12:33), Max: 98.3 (11 Jul 2023 05:07)  HR: 86 (11 Jul 2023 12:33) (80 - 90)  BP: 128/81 (11 Jul 2023 12:33) (104/81 - 137/74)  BP(mean): --  RR: 17 (11 Jul 2023 12:33) (16 - 18)  SpO2: 95% (11 Jul 2023 12:33) (95% - 97%)    Parameters below as of 11 Jul 2023 12:33  Patient On (Oxygen Delivery Method): room air        07-11    135  |  97  |  137<H>  ----------------------------<  316<H>  5.6<H>   |  19<L>  |  9.30<H>    Ca    10.2<H>      11 Jul 2023 08:33  Mg     3.1     07-11                            12.4   19.09 )-----------( 490      ( 11 Jul 2023 08:33 )             38.5       CAPILLARY BLOOD GLUCOSE      POCT Blood Glucose.: 340 mg/dL (11 Jul 2023 12:56)  POCT Blood Glucose.: 328 mg/dL (11 Jul 2023 11:51)  POCT Blood Glucose.: 282 mg/dL (11 Jul 2023 08:46)  POCT Blood Glucose.: 240 mg/dL (10 Jul 2023 21:22)  POCT Blood Glucose.: 201 mg/dL (10 Jul 2023 16:48)    Urinalysis Basic - ( 11 Jul 2023 08:33 )    Color: x / Appearance: x / SG: x / pH: x  Gluc: 316 mg/dL / Ketone: x  / Bili: x / Urobili: x   Blood: x / Protein: x / Nitrite: x   Leuk Esterase: x / RBC: x / WBC x   Sq Epi: x / Non Sq Epi: x / Bacteria: x              acetaminophen     Tablet .. 650 milliGRAM(s) Oral every 6 hours PRN  aspirin enteric coated 81 milliGRAM(s) Oral daily  cloNIDine 0.1 milliGRAM(s) Oral two times a day  dextrose 5%. 1000 milliLiter(s) IV Continuous <Continuous>  dextrose 5%. 1000 milliLiter(s) IV Continuous <Continuous>  dextrose 50% Injectable 12.5 Gram(s) IV Push once  dextrose 50% Injectable 25 Gram(s) IV Push once  dextrose 50% Injectable 25 Gram(s) IV Push once  dextrose Oral Gel 15 Gram(s) Oral once PRN  diltiazem    Tablet 60 milliGRAM(s) Oral three times a day  dronabinol 2.5 milliGRAM(s) Oral two times a day before meals  glucagon  Injectable 1 milliGRAM(s) IntraMuscular once  imipramine 50 milliGRAM(s) Oral daily  insulin glargine Injectable (LANTUS) 15 Unit(s) SubCutaneous at bedtime  insulin lispro (ADMELOG) corrective regimen sliding scale   SubCutaneous three times a day before meals  insulin lispro (ADMELOG) corrective regimen sliding scale   SubCutaneous at bedtime  lidocaine   4% Patch 1 Patch Transdermal daily  metoprolol tartrate 100 milliGRAM(s) Oral two times a day  midodrine 10 milliGRAM(s) Oral <User Schedule> PRN  mirtazapine 7.5 milliGRAM(s) Oral at bedtime  pantoprazole    Tablet 40 milliGRAM(s) Oral before breakfast  polyethylene glycol 3350 17 Gram(s) Oral daily  senna 2 Tablet(s) Oral at bedtime  sodium zirconium cyclosilicate 5 Gram(s) Oral daily              REVIEW OF SYSTEMS:  CONSTITUTIONAL: No fever, no weight loss, or no fatigue  NECK: No pain, no stiffness  RESPIRATORY: No cough, no wheezing, no chills, no hemoptysis, No shortness of breath  CARDIOVASCULAR: No chest pain, no palpitations, no dizziness, no leg swelling  GASTROINTESTINAL: No abdominal pain. No nausea, no vomiting, no hematemesis; No diarrhea, no constipation. No melena, no hematochezia.  GENITOURINARY: No dysuria, no frequency, no hematuria, no incontinence  NEUROLOGICAL: No headaches, no loss of strength, no numbness, no tremors  SKIN: No itching, no burning  MUSCULOSKELETAL: No joint pain, no swelling; No muscle, no back, no extremity pain  PSYCHIATRIC: No depression, no mood swings,   HEME/LYMPH: No easy bruising, no bleeding gums  ALLERY AND IMMUNOLOGIC: No hives       Consultant(s) Notes Reviewed:  [X] YES  [ ] NO    PHYSICAL EXAM:  GENERAL: NAD  HEAD:  Atraumatic, Normocephalic  EYES: EOMI, PERRLA, conjunctiva and sclera clear  ENMT: No tonsillar erythema, exudates, or enlargement; Moist mucous membranes  NECK: Supple, No JVD  NERVOUS SYSTEM:  Awake & alert  CHEST/LUNG: Clear to auscultation bilaterally; No rales, rhonchi, wheezing,  HEART: Regular rate and rhythm  ABDOMEN: Soft, Nontender, Nondistended; Bowel sounds present  EXTREMITIES:  No clubbing, cyanosis, or edema  LYMPH: No lymphadenopathy noted  SKIN: No rashes      Advanced care planning discussed with patient/family [X] YES   [ ] NO    Advanced care planning discussed with patient/family. Patient's health status was discussed. All appropriate changes have been made regarding patient's end-of-life care. Advanced care planning forms reviewed/discussed/completed.  20 minutes spent.

## 2023-07-11 NOTE — CHART NOTE - NSCHARTNOTEFT_GEN_A_CORE
Resident Rapid Response Note    Rapid response was called at 19:00 for unresponsiveness    Events leading up to Rapid Response: Patient had RR called earlier in the afternoon for brief period of unresponsiveness during hemodialysis. Patient awoke, but appeared very lethargic and had apparent limb strength discrepancy. CT stroke protocol was initiated revealed: Small focus increased density has developed since 7/1/2023 likely interval hemorrhage.  This aligns with the subcortical white matter within the left inferior parietal lobe (axial image 18). Patient returned to HD to complete course, however developed another episode of unresponsiveness after about 30min.     Patient was seen and examined at the bedside by the rapid response team and ICU PA at bedside. Upon arrival, patient was in Trendelenburg position with eyes open, and unlabored breathing. Patient awoke to sternal rub and verbal stimulation.      Rapid Response Vital Signs:  BP: 159/76  HR: 92  RR: 14  SpO2: 94% on 5L NC  Temp: 97.8  FS: 254        Physical Exam:  Gen: chronically-ill appearing female  HEENT: NCAT, PEERLA b/l, EOMI b/l   Cardio: regular rate and rhythm, +s1s2, no murmurs, rubs, or gallops   Pulm: CTA b/l, no wheezes, rales or rhonchi   Abdomen: soft, nontender, nondistended, +BS x4 quadrants, no guarding   Extremities: no cyanosis or edema, +2 pedal pulses   Neuro: AOx0, lethargic but arouses to pain, 4/5 strength in BUE, 0/5 strength in BLE, no sensory loss.   Skin: warm and dry           Assessment/Plan:  72-year-old female with significant past medical history for hypertension, diabetes, dementia, end-stage renal disease on hemodialysis presents to the ED status post unwitnessed fall from [facility].  Patient states that she heard some voices then rolled out of bed.  Patient complaining of right hip pain.  Unknown head trauma.  + Eliquis < from: Xray Femur showed  Fractures including the medial wall of the acetabulum and inferior right pubic ramus Admitted for pain management ,PT/OT  Rapid response called for brief episode of unresponsiveness during dialysis session and new onset LUE weakness.      -RN to call if any changes.  -Discussed with  _______, agrees with above plan  -Family updated by ____ Resident Rapid Response Note    Rapid response was called at 19:00 for unresponsiveness    Events leading up to Rapid Response: Patient had RR called earlier in the afternoon for brief period of unresponsiveness during hemodialysis. Patient awoke, but appeared very lethargic and had apparent limb strength discrepancy. CT stroke protocol was initiated revealed: Small focus increased density has developed since 7/1/2023 likely interval hemorrhage.  This aligns with the subcortical white matter within the left inferior parietal lobe (axial image 18). Patient returned to HD to complete course, however developed another episode of unresponsiveness after about 30min.     Patient was seen and examined at the bedside by the rapid response team and ICU PA at bedside. Upon arrival, patient was in Trendelenburg position with eyes open, and unlabored breathing. Patient awoke to sternal rub with movement of UE and opening of eyes.    Rapid Response Vital Signs:  BP: 159/76  HR: 92  RR: 14  SpO2: 94% on 5L NC  Temp: 97.8  FS: 254    Physical Exam:  Gen: chronically-ill appearing female  HEENT: NCAT, PEERLA b/l, EOMI b/l   Cardio: regular rate and rhythm, +s1s2, no murmurs, rubs, or gallops   Pulm: CTA b/l, no wheezes, rales or rhonchi   Abdomen: soft, nontender, nondistended, +BS x4 quadrants, no guarding   Extremities: no cyanosis or edema, +2 pedal pulses   Neuro: AOx0, lethargic but arouses to pain, 4/5 strength in BUE, 0/5 strength in BLE, no sensory loss.   Skin: warm and dry       Assessment/Plan:  72y F pmhx HTN, T2DM, dementia, ESRD on HD, admitted with hip fractures and UTI s/p fall at [place]. Course complicated by two episodes of unresponsiveness during dialysis and CT brain imaging showing "Small focus increased density developed in the left parietal lobe since 7/1/2023, likely interval brain parenchymal hemorrhage. contrast-enhanced CT was performed earlier today and residual enhancement within a vascular structure could mimic this imaging appearance. Further evaluation by MR brain recommended".  - stat ABG and ammonia unremarkable; Hgb stable  - increase in leukocytosis; ID escalated patient to ceftolozane-tazobactam  - patient continues with AMS/unresponsiveness; there concern for intracranial hemorrhage vs seizure/post-ictal state  - discussed with Neurology Dr. Shepherd recommending repeat CT brain @ 10pm for interval change  - discussed with transfer center on call Neuro: hemorrhage is very small, recommending repeat CT scan tonight, MRI and EEG when able  - discussed with transfer center on call Neurosurgeon: hemorrhage not seen by on call neurosurgeon; recommending work up for other causes of AMS including MRI and EEG. No acute need for neuro-surgical intervention.   - Discussed with ICU PA who will accept patient for close neurological  monitoring  - will give stat dose Keppra; repeat CT brain 10pm; plan for MRI and EEG when able. Patient may need transfer for EEG if unavailable here   - discussed with family via telephone aware and agree with plan Resident Rapid Response Note    Rapid response was called at 19:00 for unresponsiveness    Events leading up to Rapid Response: Patient had RR called earlier in the afternoon for brief period of unresponsiveness during hemodialysis. Patient awoke, but appeared very lethargic and had apparent limb strength discrepancy. CT stroke protocol was initiated revealed: Small focus increased density has developed since 7/1/2023 likely interval hemorrhage.  This aligns with the subcortical white matter within the left inferior parietal lobe (axial image 18). Patient returned to HD to complete course, however developed another episode of unresponsiveness after about 30min.     Patient was seen and examined at the bedside by the rapid response team and ICU PA at bedside. Upon arrival, patient was in Trendelenburg position with eyes open, and unlabored breathing. Patient awoke to sternal rub with movement of UE and opening of eyes.    Rapid Response Vital Signs:  BP: 159/76  HR: 92  RR: 14  SpO2: 94% on 5L NC  Temp: 97.8  FS: 254    Physical Exam:  Gen: chronically-ill appearing female  HEENT: NCAT, PEERLA b/l, EOMI b/l   Cardio: regular rate and rhythm, +s1s2, no murmurs, rubs, or gallops   Pulm: CTA b/l, no wheezes, rales or rhonchi   Abdomen: soft, nontender, nondistended, +BS x4 quadrants, no guarding   Extremities: no cyanosis or edema, +2 pedal pulses   Neuro: AOx0, somnolent but arouses to pain, 4/5 strength in BUE, 0/5 strength in BLE, no sensory loss.   Skin: warm and dry       Assessment/Plan:  72y F pmhx HTN, T2DM, dementia, ESRD on HD, admitted with hip fractures and UTI s/p fall at [place]. Course complicated by two episodes of unresponsiveness during dialysis and CT brain imaging showing "Small focus increased density developed in the left parietal lobe since 7/1/2023, likely interval brain parenchymal hemorrhage. contrast-enhanced CT was performed earlier today and residual enhancement within a vascular structure could mimic this imaging appearance. Further evaluation by MR brain recommended".  - stat ABG and ammonia unremarkable; Hgb stable  - increase in leukocytosis; ID escalated patient to ceftolozane-tazobactam  - patient continues with AMS/unresponsiveness; there concern for intracranial hemorrhage vs seizure/post-ictal state  - discussed with Neurology Dr. Shepherd recommending repeat CT brain @ 10pm for interval change  - discussed with transfer center on call Neuro: hemorrhage is very small, recommending repeat CT scan tonight, MRI and EEG when able  - discussed with transfer center on call Neurosurgeon: hemorrhage not seen by on call neurosurgeon; recommending work up for other causes of AMS including MRI and EEG. No acute need for neuro-surgical intervention.   - Discussed with ICU PA who will accept patient for close neurological  monitoring  - will give stat dose Keppra; repeat CT brain 10pm; plan for MRI and EEG when able. Patient may need transfer for EEG if unavailable here   - discussed with family via telephone aware and agree with plan Resident Rapid Response Note    Rapid response was called at 19:00 for unresponsiveness    Events leading up to Rapid Response: Patient had RR called earlier in the afternoon for brief period of unresponsiveness during hemodialysis. Patient awoke, but appeared very lethargic and had apparent limb strength discrepancy. CT stroke protocol was initiated revealed: Small focus increased density has developed since 7/1/2023 likely interval hemorrhage.  This aligns with the subcortical white matter within the left inferior parietal lobe (axial image 18). Patient returned to HD to complete course, however developed another episode of unresponsiveness after about 30min.     Patient was seen and examined at the bedside by the rapid response team and ICU PA at bedside. Upon arrival, patient was in Trendelenburg position with eyes open, and unlabored breathing. Patient awoke to sternal rub with movement of UE and opening of eyes.    Rapid Response Vital Signs:  BP: 159/76  HR: 92  RR: 14  SpO2: 94% on 5L NC  Temp: 97.8  FS: 254    Physical Exam:  Gen: chronically-ill appearing female  HEENT: NCAT, PEERLA b/l, EOMI b/l   Cardio: regular rate and rhythm, +s1s2, no murmurs, rubs, or gallops   Pulm: CTA b/l, no wheezes, rales or rhonchi   Abdomen: soft, nontender, nondistended, +BS x4 quadrants, no guarding   Extremities: no cyanosis or edema, +2 pedal pulses   Neuro: AOx0, somnolent but arouses to pain, 4/5 strength in BUE, 0/5 strength in BLE, no sensory loss.   Skin: warm and dry       Assessment/Plan:  72y F pmhx HTN, T2DM, dementia, ESRD on HD, admitted with hip fractures and UTI s/p fall at [place]. Course complicated by two episodes of unresponsiveness during dialysis and CT brain imaging showing "Small focus increased density developed in the left parietal lobe since 7/1/2023, likely interval brain parenchymal hemorrhage. contrast-enhanced CT was performed earlier today and residual enhancement within a vascular structure could mimic this imaging appearance. Further evaluation by MR brain recommended".  - stat ABG and ammonia unremarkable; Hgb stable  - increase in leukocytosis; ID escalated patient to ceftolozane-tazobactam  - patient continues with AMS/unresponsiveness; there concern for intracranial hemorrhage vs seizure/post-ictal state  - discussed with Neurology Dr. Shepherd recommending repeat CT brain @ 10pm for interval change  - discussed with transfer center on call Neuro Dr. Radha Umanzorni: hemorrhage is very small, recommending repeat CT scan tonight, MRI and EEG when able  - discussed with transfer center on call Neurosurgeon: hemorrhage not seen by on call neurosurgeon; recommending work up for other causes of AMS including MRI and EEG. No acute need for neuro-surgical intervention.   - Discussed with ICU PA who will accept patient for close neurological  monitoring  - will give stat dose Keppra; repeat CT brain 10pm; plan for MRI and EEG when able. Patient may need transfer for EEG if unavailable here   - discussed with family via telephone aware and agree with plan

## 2023-07-11 NOTE — PROGRESS NOTE ADULT - ASSESSMENT
REASON FOR VISIT  .. Management of problems listed below      NOTEWORTHY FINDINGS.     PHYSICAL EXAM    HEENT Unremarkable  atraumatic   RESP Fair air entry  Harsh breath sound   CARDIAC S1 S2 No S3     NO JVD    ABDOMEN No hepatosplenomegaly   PEDAL EDEMA present No calf tenderness  NO rash       GENERAL DATA .     GOC.    . prior  ICU STAY.   .. none  COVID.   ..    BEST PRACTICE ISSUES.    HOB ELEVATN.   .. Yes  DVT PPLX.   . 7/10 apixa held for thora  .. 7/3/2023 apixa 2.5 x2 restarted as IR feels we should tap fluid only if she develops shortness of breath  ..    7/2/2023 Apixaba 2.5 x2 held for thorac   STRESS ULCER PPLX.   ..      INFECTION PPLX.   ..    SP SW AMINAH.    ..        DIET.    ..  7/4/2023 mince moist  IV fl.  ..      PROCEDURES.  ..   PAST PROCEDURES.    ..     GENERAL DATA .     ALLGY.  ..     latex                     WT.  ..  7/1/2023 54  BMI.  ..    7/1/2023 17     ABGS.    VS/ PO/IO/ VENT/ DRIPS  7/11/2023 afeb 80 130/70   7/11/2023 ra 95%     CC/DOA.        . 7/1/2023 Pt sent from [facility] for unwitnessed fall out of bed.       .  PRESENTATION.   . 7/1/2023 72-year-old female former smoker quit 3/2012  with PMH for hypertension, CAD sp cabg  PVD   sp R-> L bifem - fem BK pop bypass  Fem pop bypass 6/21/2022  Partial ray amputation r great toe 6/22/2022  diabetes, dementia, ESRD on hemodialysis   a fib on eliquis sp recent hosp stay 5/17-5/24/2023     . ENTERORHINOVIRUS INFECTION RESP TRACT 5/17  . PLEURAL EFFUSION SP l THORA 5/18 TRANSUDATE     COURSE   . ACETABULAR Fx Ortho recommended non surgical mgmt  . PL EFFSN Was decided to hold off thora unless she develops sob  . LEUKOCYTOSIS 7/11/2023 dw ID will plan thorac to exclude infecn     PROBLEMS/ASSESSMENT/RECOMMENDATIONS (A/R).  PULMONARY.  . GAS EXCHANGE.  .. A/R.   .. Monitor pulse ox and target po 90-95%    . PLEURAL EFFSN.  .. RELEVANT PAST HISTORU  .. 5/18/2023 l thorac 1.5 l   .. 5/18 pl fl l 13 m 73 p 5 afb sm (-) g 131 l 102/268 (.38) ph 7.6 pr 2.6/6.4 (.4)  .. Fluid transudate  .. WORKUP   .. CXR 7/1/2023 cxr Mod large l pl effs or lower zone airspace consolidation/atelecatsis   .. CT ch 7/1/2023 Ct ch   .... Decreased mod l effs since 5/17/2023   .. EVENTS  .. 7/2/2023 DW pt and dw son consensus is that they want fluid remived   .. 7/3/2023 dw ir plan changed plan is to tap if she becomes symptimatic   .. 7/3/2023 Thora cancelled as pt is comfortable will pan thora if she becomes symptomatic   .. 7/10/2023 as pt has persistent leukocytosis if no impovement will need thora so apixa 2.5 bid staoppd   .. A/R.  . 7/11/2023  thora diagn orderd    ID.  . ESBL UTI.  .. WORKUP.  .. W 7/1-7/2-7/3-7/6-7/7-7/9-7/10-7/11/2023 W 15 - 14 - 13- 13 - 15- 18 - 19 - 19   .. EVENTS.   .. 7/1/2023 Checlk blood and urine cs   .. uc 7/3/2023 100 k pseudomonas carbapenem resist    .. uc 7/3/2023 50-99 Klebs esbl   .. ABIO.  .. 7/8/2023 meropenem 500 x 3d Dr KAYE     . PERSISTENT LEUKOCYTOSIS.  .. w 7/11/2023 w 19  .. ct hip r 7/11/2023   .... no hematoma or fluid collectn   .... r acetabular fc 2.1 x 3.4 cm cortical stepoff   .. A/R.  .. 7/11/2023 dw id  will plan thorac     CARDIO.  . HEMODYNAMICS.  .. 7/8/2023 midodrine 10.3 Dr CLIFFORD     . CAD.  .. 7/1/2023 ASA 81   .. A/R  .. cont rx    . A fib.  .. 7/1/2023 CARDIZEM 60.3   .. 7/3/2023 apixaba 2.5 x2   .. 7/10 apixa held  .. AR  .. Cont rx    HEMAT.  .. WORKUP.  .. Hb 7/1-7/2-7/3-7/6-7/9-7/11/2023 Hb 13 - 11.8 - 11.2 - 11- 12 -12.4   .. INR 7/1/2023    .. A/R  .. As pt was on apixaba will monitorserially     RENAL.  . ESRD.  .. WORKUP  .. NA 7/1/2023    .. K 7/1/2023 K 5.1   .. CR 7/1/2023 CR 5    ORTHO.  . FALL 7/1/2023.  .. CT head C sp 7/1/2023 CT HC (-)     . FRACTURE ACETABULUM r INFERIOR PUBIC RAMUS r 7/1/2023   .. IMAGING.  .. XR Pelvis and r hip 7/1/2023 XR Fractures r acetabulum and inf r pubic ramus   .. 7/1/2023 ct pelvix   .... comminuted r acetabular fx involving r sup and inf pubic rami medial wallacetabulum sup acetabulum and post wall acetabulum   .... small hematoma r pelvic sidewall   .. ORTHO.  .. 7/1/2023 DW Dr Dias She spoke to Ortho Dr Jenkins group and plan is for nonsurgical management  .. A/R  .. Monitor serial Hb ro bleed     . MAIN ISSUES  .. FX acetabulum on conservative rx  . A fib on doac  . ESRD   . UTI 7/8/2023 started on meropenem by ID (UTI)   . 7/10/2023 If leukocytosis persists will plan thorac apixa stoppd   . LEUKOCYTOSIS 7/11/2023 dw ID will plan thorac to exclude infecn     TIME SPENT   . About 36 minutes aggregate care time spent on encounter; activities included   direct patient care, counseling and/or coordinating care reviewing notes, lab data/ imaging , discussion with multidisciplinary team/ patient  /family and explaining in detail risks, benefits, alternatives  of the recommendations     JOSE F LAO 72 f7/1/2023 1948 DR HARRY ALBRIGHT

## 2023-07-11 NOTE — CHART NOTE - NSCHARTNOTEFT_GEN_A_CORE
Patient is a 72y old  Female who presents with a chief complaint of s/p fall (11 Jul 2023 14:33)      BRIEF HOSPITAL COURSE-  71 y/o Female with PMH for Hypertension, DM, Dementia, ESRD (On HD TTS) presents to  ED s/p unwitnessed fall from [facility]. States that she heard some voices, and then rolled out of bed. Reports associated right hip pain. Unable to obtain further history. Admitted to Medicine for pain management.    Events last 24 hours:   RRT called this evening for acute episode of unresponsiveness. On arrival, patient alert but minimally interactive with fixed gaze. Eliquis held, with known LE DVT. Concern for possible CVA. Transitioned to Code Stroke.       Review of Systems:    [X] Unable to obtain secondary to current mental status/ medical condition.       PAST MEDICAL & SURGICAL HISTORY-  HTN (hypertension)      h/o Anxiety attack      Depression      h/o Myocardial infarct 2007      h/o Hepatitis A 1969  currently resolved, no symptoms      Murmur, cardiac      h/o Smoking  quitted 3/2012      Anemia secondary to renal failure      ESRD on dialysis      Falls      Ataxia      Type 2 diabetes mellitus      Peripheral vascular disease, unspecified      CAD (coronary artery disease)      Diabetes      coronary stent 2007      s/p Ovarian cyst removal      s/p surgical removal of benign Skin lesion epigastric area      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          Medications:    cloNIDine 0.1 milliGRAM(s) Oral two times a day  diltiazem    Tablet 60 milliGRAM(s) Oral three times a day  metoprolol tartrate 100 milliGRAM(s) Oral two times a day  midodrine 10 milliGRAM(s) Oral <User Schedule> PRN      acetaminophen     Tablet .. 650 milliGRAM(s) Oral every 6 hours PRN  dronabinol 2.5 milliGRAM(s) Oral two times a day before meals  imipramine 50 milliGRAM(s) Oral daily  mirtazapine 7.5 milliGRAM(s) Oral at bedtime      aspirin enteric coated 81 milliGRAM(s) Oral daily    pantoprazole    Tablet 40 milliGRAM(s) Oral before breakfast  polyethylene glycol 3350 17 Gram(s) Oral daily  senna 2 Tablet(s) Oral at bedtime      dextrose 50% Injectable 12.5 Gram(s) IV Push once  dextrose 50% Injectable 25 Gram(s) IV Push once  dextrose 50% Injectable 25 Gram(s) IV Push once  dextrose Oral Gel 15 Gram(s) Oral once PRN  glucagon  Injectable 1 milliGRAM(s) IntraMuscular once  insulin glargine Injectable (LANTUS) 15 Unit(s) SubCutaneous at bedtime  insulin lispro (ADMELOG) corrective regimen sliding scale   SubCutaneous three times a day before meals  insulin lispro (ADMELOG) corrective regimen sliding scale   SubCutaneous at bedtime    dextrose 5%. 1000 milliLiter(s) IV Continuous <Continuous>  dextrose 5%. 1000 milliLiter(s) IV Continuous <Continuous>      lidocaine   4% Patch 1 Patch Transdermal daily    sodium zirconium cyclosilicate 5 Gram(s) Oral daily        ICU Vital Signs Last 24 Hrs  T(C): 36.6 (11 Jul 2023 12:33), Max: 36.8 (11 Jul 2023 05:07)  T(F): 97.9 (11 Jul 2023 12:33), Max: 98.3 (11 Jul 2023 05:07)  HR: 80 (11 Jul 2023 14:43) (80 - 89)  BP: 132/70 (11 Jul 2023 14:43) (128/81 - 137/74)  BP(mean): --  ABP: --  ABP(mean): --  RR: 17 (11 Jul 2023 12:33) (16 - 18)  SpO2: 95% (11 Jul 2023 12:33) (95% - 97%)    O2 Parameters below as of 11 Jul 2023 12:33  Patient On (Oxygen Delivery Method): room air        I&O's Detail          LABS:                        12.4   19.09 )-----------( 490      ( 11 Jul 2023 08:33 )             38.5     07-11    135  |  97  |  137<H>  ----------------------------<  316<H>  5.6<H>   |  19<L>  |  9.30<H>    Ca    10.2<H>      11 Jul 2023 08:33  Mg     3.1     07-11        CAPILLARY BLOOD GLUCOSE  POCT Blood Glucose.: 229 mg/dL (11 Jul 2023 17:02)        Urinalysis Basic - ( 11 Jul 2023 08:33 )  Color: x / Appearance: x / SG: x / pH: x  Gluc: 316 mg/dL / Ketone: x  / Bili: x / Urobili: x   Blood: x / Protein: x / Nitrite: x   Leuk Esterase: x / RBC: x / WBC x   Sq Epi: x / Non Sq Epi: x / Bacteria: x        CULTURES:      Physical Examination:  General: Elderly cachetic female, chronically ill appearing. In no acute distress.    HEENT: Pupils equal, reactive to light. Symmetric. Fixed gaze.   PULM: Clear to auscultation bilaterally, no adventitious sounds. No significant sputum production.  NECK: Supple, no lymphadenopathy, trachea midline.  CVS: Regular rate and rhythm. No murmurs, rubs, or gallops. S1, S2 intact.   ABD: Soft, nondistended, nontender, normoactive bowel sounds. No masses palpable.  EXT: No edema, nontender.  SKIN: Warm and well perfused, no rashes noted.  NEURO: Alert, but lethargic. Minimally interactive, spontaneously moving UE, able to follow some commands. Strength on RUE >LUE.  DEVICES:         RADIOLOGY-    < from: CT Brain Stroke Protocol (07.11.23 @ 17:29) >    ACC: [id number] EXAM:  CT BRAIN STROKE PROTOCOL   ORDERED BY: ANGEL HOOKER     *** ADDENDUM # 1 ***    CRITICAL VALUE:    I discussed this case with Dr. Parisi at  7/11/2023   6:10 PM.  Hospital policies for critical values including read back   policy were followed.  The verbal communication of the critical value   supplements this written report.    --- End of Report ---    *** END OF ADDENDUM # 1 ***      PROCEDURE DATE:  07/11/2023      INTERPRETATION:  CT head without IV contrast:   Stroke Code Protocol    CLINICAL INFORMATION:     CVA/stroke.   Code Stroke  PCT  Admitting Dxs:   S32.409A UNSP FRACTURE OF UNSP ACETABULUM, INIT FOR CLOS FX    TECHNIQUE:  Contiguous axial 3 mm thick images were acquired.  This data   set was reconstructed in the sagittal and coronal planes.  Contrast was   not administered for this examination.    DOSE INFORMATION:   This scan was performed using automatic exposure   control (radiation dose reduction software) to obtain a diagnostic image   quality scan with patient dose as low as reasonably achievable.   Total   DLP for this examination is estimated at 813 mGy*cm.    COMPARISON:   CT head 7/1/2023 available for review.    FINDINGS:    BRAIN:  Small focus increased density has developed since 7/1/2023 likely   interval hemorrhage.  This aligns with the subcortical white matter   within the left inferior parietal lobe (axial image 18).  The overlying   cerebral cortex appears to remain intact. No other interval changes   recognized since 7/1/2023. Again noted are moderately extensive patchy   and confluent regions of diminished attenuation typical for ischemic   white matter disease. No cerebral cortical lesion has developed.     Cerebral cortical gray-white matter differentiation is maintained.  No   acute cerebral cortical infarct is seen.  No subdural subarachnoid or   intraventricular hemorrhage is found.  No mass effect is found in the   brain.    CSF SPACES:  The ventricles, sulci and basal cisterns  appear moderately   dilated reflecting diffuse brain volume loss.   No differential cerebral   cortical atrophy is recognized.    VESSELS:   Intracranial vasculature demonstrates no asymmetric or   segmental hyperdensity that is suspicious for embolus.  Atherosclerotic   calcifications involve the cavernous and clinoid segments of the internal   carotid arteries. There is also involvement each vertebral artery. Right   vertebral artery appears dominant caliber. The posterior circulation is   tortuous.    HEAD AND NECK STRUCTURES:  The orbits are unremarkable.  The paranasal   sinuses  are clear. The right frontal sinus is hypoplastic. The nasal   cavity appears intact.  The nasopharynx is symmetric.  The central skull   base is intact.  The temporal bones demonstratepatent petrous air cells.    The calvarium appears unremarkable.      IMPRESSION:    Small focus increased density has developed in the left parietal lobe   since 7/1/2023 likely interval brain parenchymal hemorrhage. Note that a   contrast-enhanced CT was performed earlier today and residual enhancement   within a vascular structure could mimic this imaging appearance. Further   evaluation by MR brain recommended.      CRITICAL VALUE:   This was reported to the nursing floor at  7/11/2023   5:34PM.    --- End of Report ---    ***Please see the addendum at the top of this report. It may contain   additional important information or changes.****      MARY HALEY MD; Attending Radiologist  This document has been electronically signed. Jul 11 2023  5:57PM  1st Addendum: MARY HALEY MD; Attending Radiologist  The first addendum was electronically signed on: Jul 11 2023  6:14PM.    < end of copied text >      < from: CT Angio Neck w/ IV Cont (07.11.23 @ 17:29) >    ACC: [id number] EXAM:  CT PERFUSION W MAPS IC   ORDERED BY: ANGEL HOOKER     ACC: [id number] EXAM:  CT ANGIO BRAIN (W)AW IC   ORDERED BY: ANGEL HOOKER     ACC: [id number] EXAM:  CT ANGIO NECK (W)AW IC   ORDERED BY: ANGEL HOOKER     PROCEDURE DATE:07/11/2023          INTERPRETATION:  Three examinations were performed on this patient:  1.  CT Angiography of the carotid arteries with IV contrast (and without,   if performed)  2.  CT Angiography of the intracranial circulation with IV contrast (and   without, if performed)  3.  CT brain perfusion:   CT head with and without IV contrast    CLINICAL INFORMATION:      CVA/STROKE    TECHNIQUE (CTAexaminations):   CT angiography images at 1 mm thickness   were acquired from the skull base to the skull vertex as a single helical   acquisition.   Images were acquired during rapid bolus intravenous   administration of  nonionic contrast  volume 0 cc administered (accession   [id number]), 60 cc administered (accession [id number]), 80 cc administered   (accession [id number])  (or default volume 100 mL).  This data set was   reconstructed sagittal and coronal images.  3D MIP reconstruction images   were obtained.    Post processing angiographic reconstruction of images   was performed. This data set was  reconstructed  and reviewed in multiple   projections to evaluate carotid morphology and intracranial vessels.     The magnitude of stenosis was evaluated based on the diameter of an   intact distal of the internal carotid artery using NASCET criteria.    TECHNIQUE (CT brain perfusion):  Multiple sequential acquisitions through   the head during dynamic rapid bolus administration of intravenous   contrast administration 0 cc administered (accession [id number]), 60 cc   administered (accession [id number]), 80 cc administered (accession   [id number]).   Helical 10 mm images were obtained at multiple time points   Perfusion data is reconstructed as cerebral perfusion, cerebral blood   volume, time to maximum perfusion and mean transit time color-coded   images at 10 mm thick sections.    ARTIFICIAL INTELLIGENCE:   This study was analyzed with AI algorithms   including deep machine learning to assist in the evaluation of this brain   perfusion data set.    DOSE INFORMATION:   This scan was performed using automatic exposure   control (radiation dose reduction software) to obtain a diagnostic image   quality scan with patient dose as low as reasonably achievable.   Total   DLP for this examination is estimated at mGy-cm.    COMPARISON:    Concurrent CT head stroke code is available for review.      FINDINGS:    CTA CAROTID CIRCULATION:    The thoracic aortic arch demonstrates calcified and non-calcified   atherosclerotic plaque.  The great vessel origins remain patent.    The right carotid circulation demonstrates mild calcified atherosclerotic   plaque in the proximal and distal common carotid artery. No stenosis   results. The carotid bulb demonstrates extensive predominantly calcified   atherosclerotic plaque causing luminal irregularity and narrowing. This   narrowing remains less than 50%, without hemodynamically significant   stenosis.  The internal carotid also demonstrates calcified   atherosclerotic plaque in its initial one third without critical   stenosis, patent to the skull base.    The left carotid circulation demonstrates patchy calcified   atherosclerotic plaque along the common carotid artery. No stenosis   results. The carotid bulb demonstrates calcified and noncalcified   atherosclerotic plaque causing luminal irregularity and narrowing. This   remains less than 50% of the more intact distal caliber, without   hemodynamically significant stenosis.  The internal carotid also   demonstrates calcified atherosclerotic plaque in this initial segment   with acritical stenosis, patent to the skull base.    The vertebral arteries are patent. Mild calcified atherosclerotic plaque   is noted at the ostium of the left vertebral artery without critical   stenosis. The left vertebral artery also noted is calcified   atherosclerotic plaque in its initial foraminal V2 segment without   stenosis. The degree of vertebral asymmetry is within physiologic limits.   Each vertebral artery ascends in the neck with near constant caliber.    CTA INTRACRANIAL CIRCULATION:    The ANTERIOR circulation demonstrates intact inflow from the ascending   cervical segment to the petrous segment of each internal carotid artery.   The cavernous and clinoid segments demonstrate extensive calcified   atherosclerotic plaque throughout. This causes luminal irregularity and   low grade narrowing. On the right this appears to remain less than 50%.   At the left clinoid segment this may exceed 50% but does not appear   hemodynamically significant   The ophthalmic arteries are demonstrated as   patent vessels on each side.    The anterior cerebral arteries are patent   and symmetric.  An anterior communicating artery is present. The A2   segments are patent to peripheral branching. The right middle cerebral   artery demonstrates an intact initial M1 segment and patent peripheral   anterior and posterior division sylvian branches.  The left middle   cerebral artery demonstrates an intact initial M1 segment and patent   peripheral anterior and posterior division sylvian branches.    The POSTERIOR circulation demonstrates intact inflow from each vertebral   artery.   The right vertebral artery demonstrates calcified   atherosclerotic plaque with luminal narrowing that appears to remain less   than 50%.    PICA arteries are patent on each side. Vertebrobasilar   circulation is tortuous. The basilar artery appears intact.  Superior   cerebellar arteries are demonstrated.  Posterior communicating arteries   are demonstrated on each side. On the right the posterior communicating   and P1 segments are nearly equal in caliber, smaller caliber on the left.   Each posterior cerebral artery is patent to peripheral branching.    The principal dural venous sinuses are patent.  This includes the   superior sagittal sinus anterior and posterior limbs.  Each transverse   sinus is patent to sigmoid sinuses and patent jugular veins.  The early   arterial phase of imaging and low venous enhancement somewhat limits this   evaluation.    No intracranial aneurysm or vascular malformation is identified.  No   anomalous vascularity is found in the left parietal lobe where a focal   lesion was identified by noncontrast CT.    CT BRAIN PERFUSION:    The arterial input function is rapid and physiologic in appearance. The   venous output function follows at an appropriate delay and higher peak,   physiologic.  The cursors for these data acquisitions appear   appropriately positioned.    Cerebral perfusion images demonstrate no significant asymmetric region of   diminished in-flow perfusion.  The volume of brain parenchyma receiving   cerebral blood flow diminished further than 30% is 0 mL.    Cerebral blood volume images demonstrate no significant asymmetric region   of abnormal blood volume.    Mean transit time of brain perfusion and time to maximum perfusion images   demonstrate no significant asymmetric delay.  The volume of brain   parenchyma demonstrating delay in maximum enhancement greater than 6   seconds is 0 mL. At the lower threshold of 4 seconds delay to time to   maximum perfusion within each cerebral hemisphere there are patchy   regions of delayed perfusion within the anterior to posterior   parasagittal distribution corresponding to the end arterial watershed   zones between the arterial distributions of the principal anterior middle   and posterior cerebral arteries, physiologic versus low level ischemia at   these locations.    ADDITIONAL FINDINGS:   Large left pleural effusion is present.   Degenerative arthritis is present in the cervical spine. There is grade 1   anterolisthesis  C4 on C5, more mild C5 on C6. There appears to be   acquired ankylosis of C6 and C7. There are osteoarthritic changes at the   articulation of the anterior ring of C1 and the odontoid process of C2.    This arthropathy includes marginal osteophytes, subchondral sclerosis and   joint space narrowing.      IMPRESSION:    1.   Right carotid system:    Atherosclerotic plaque without    hemodynamically significant stenosis.    2.   Left carotid system:     Atherosclerotic plaque without    hemodynamically significant stenosis.    3.   Vertebral circulation:    Atherosclerotic plaque ostium left   vertebral artery without hemodynamically significant stenosis    4.  Anterior intracranial circulation:     Atherosclerotic plaque   cavernous and clinoid segments of the internal carotid arteries,   mild-to-moderate on the right and moderate on the left.    5.  Posterior intracranial circulation:    Intracranial atherosclerosis   right vertebral artery, mild tomoderate    6.  No large vessel occlusion    7.  Brain perfusion:   No acute infarction of the brain is convincingly   demonstrated.      < end of copied text >        CRITICAL CARE TIME SPENT: 45 minutes assessing presenting problems of acute illness, which pose high probability of life threatening deterioration or end organ damage/dysfunction, as well as medical decision making including initiating plan of care, reviewing data, reviewing radiologic exams, discussing with multidisciplinary team,  discussing goals of care with patient/family, and writing this note.  Non-inclusive of procedures performed,        ASSESSMENT-  71 y/o Female with PMH for Hypertension, DM, Dementia, ESRD (On HD TTS) presents to [facility] s/p unwitnessed fall from [facility]      1. Acute Metabolic Encephalopathy   2. R/O Acute CVA        -RRT called during HD this afternoon secondary to altered mental status. On arrival, patient alert however with fixed gaze. Initially unresponsive, however with time improved mental status. At baseline patient AOx1 per staff. Withdrawals to pain B/L, able to follow some commands, strength intact on RUE and BL LE however with reduced strength on LUE.   -Changed to CODE Stroke. CT brain protocol ordered. Patient with history of LE DVT however AC held for thoracentesis.   -Concern for acute CVA v seizure. Per staff, no witnessed tonic clonic activity. Will order Lactate/ Prolactin to r/o acute seizure event.

## 2023-07-11 NOTE — PROVIDER CONTACT NOTE (CRITICAL VALUE NOTIFICATION) - SITUATION
Pt's blood sugar was 440. Dr. Toussaint notified. Insulin per sliding scale given
troponin high sensitivity 96.9

## 2023-07-11 NOTE — PROGRESS NOTE ADULT - SUBJECTIVE AND OBJECTIVE BOX
CHIEF COMPLAINT/ REASON FOR VISIT  .. Patient was seen to address the  issue listed under PROBLEM LIST which is located toward bottom of this note     SHILO KOHLER    PLSHARON 3WES 366 D1    Allergies    No Known Drug Allergies  latex (Hives)    Intolerances        PAST MEDICAL & SURGICAL HISTORY:  HTN (hypertension)      h/o Anxiety attack      Depression      h/o Myocardial infarct 2007      h/o Hepatitis A 1969  currently resolved, no symptoms      Murmur, cardiac      h/o Smoking  quitted 3/2012      Anemia secondary to renal failure      ESRD on dialysis      Falls      Ataxia      Type 2 diabetes mellitus      Peripheral vascular disease, unspecified      CAD (coronary artery disease)      Diabetes      coronary stent 2007      s/p Ovarian cyst removal      s/p surgical removal of benign Skin lesion epigastric area      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          FAMILY HISTORY:  FH: myocardial infarction (Father)    FH: myocardial infarction (Father)    Family history of type 2 diabetes mellitus in brother (Sibling)        Home Medications:  acetaminophen 325 mg oral tablet: 2 tab(s) orally every 6 hours as needed (02 Jul 2023 15:24)  Admelog SoloStar 100 units/mL injectable solution: injectable 3 times a day (before meals)sliding scale  (02 Jul 2023 15:24)  apixaban 2.5 mg oral tablet: 1 tab(s) orally 2 times a day (02 Jul 2023 15:24)  aspirin 81 mg oral delayed release tablet: 1 tab(s) orally once a day (at bedtime) (02 Jul 2023 15:24)  atorvastatin 20 mg oral tablet: 1 tab(s) orally once a day (at bedtime) (02 Jul 2023 15:24)  bacitracin 500 units/g topical ointment: Apply topically to affected area once a day to right knee abrasion (02 Jul 2023 11:54)  Basaglar KwikPen 100 units/mL subcutaneous solution: 8 international unit(s) subcutaneous once a day (at bedtime) (02 Jul 2023 15:24)  cloNIDine 0.1 mg oral tablet: 1 tab(s) orally 2 times a day (02 Jul 2023 15:24)  DilTIAZem (Eqv-Cardizem CD) 180 mg/24 hours oral capsule, extended release: 1 cap(s) orally once a day (02 Jul 2023 15:24)  imipramine 50 mg oral tablet: 1 tab(s) orally once a day (in the evening) (02 Jul 2023 15:24)  metoprolol tartrate 100 mg oral tablet: 1 tab(s) orally 2 times a day (02 Jul 2023 15:24)  mirtazapine 7.5 mg oral tablet: 1 tab(s) orally once a day (at bedtime) (02 Jul 2023 15:24)  Nephro-Alfie oral tablet: 1 tab(s) orally once a day (02 Jul 2023 15:24)  PANTOPRAZOLE 40MG DR TAB: 1 tab(s) orally once a day (02 Jul 2023 15:24)  senna leaf extract oral tablet: 2 tab(s) orally once a day (at bedtime) (02 Jul 2023 15:24)      MEDICATIONS  (STANDING):  aspirin enteric coated 81 milliGRAM(s) Oral daily  cloNIDine 0.1 milliGRAM(s) Oral two times a day  dextrose 5%. 1000 milliLiter(s) (100 mL/Hr) IV Continuous <Continuous>  dextrose 5%. 1000 milliLiter(s) (50 mL/Hr) IV Continuous <Continuous>  dextrose 50% Injectable 25 Gram(s) IV Push once  dextrose 50% Injectable 25 Gram(s) IV Push once  dextrose 50% Injectable 12.5 Gram(s) IV Push once  diltiazem    Tablet 60 milliGRAM(s) Oral three times a day  glucagon  Injectable 1 milliGRAM(s) IntraMuscular once  imipramine 50 milliGRAM(s) Oral daily  insulin glargine Injectable (LANTUS) 15 Unit(s) SubCutaneous at bedtime  insulin lispro (ADMELOG) corrective regimen sliding scale   SubCutaneous three times a day before meals  insulin lispro (ADMELOG) corrective regimen sliding scale   SubCutaneous at bedtime  lidocaine   4% Patch 1 Patch Transdermal daily  metoprolol tartrate 100 milliGRAM(s) Oral two times a day  mirtazapine 7.5 milliGRAM(s) Oral at bedtime  pantoprazole    Tablet 40 milliGRAM(s) Oral before breakfast  polyethylene glycol 3350 17 Gram(s) Oral daily  senna 2 Tablet(s) Oral at bedtime  sodium zirconium cyclosilicate 5 Gram(s) Oral daily    MEDICATIONS  (PRN):  acetaminophen     Tablet .. 650 milliGRAM(s) Oral every 6 hours PRN Temp greater or equal to 38C (100.4F), Mild Pain (1 - 3)  dextrose Oral Gel 15 Gram(s) Oral once PRN Blood Glucose LESS THAN 70 milliGRAM(s)/deciliter  midodrine 10 milliGRAM(s) Oral <User Schedule> PRN b/p              Vital Signs Last 24 Hrs  T(C): 36.8 (11 Jul 2023 05:07), Max: 36.8 (11 Jul 2023 05:07)  T(F): 98.3 (11 Jul 2023 05:07), Max: 98.3 (11 Jul 2023 05:07)  HR: 83 (11 Jul 2023 05:07) (80 - 92)  BP: 134/77 (11 Jul 2023 05:07) (104/81 - 159/84)  BP(mean): --  RR: 16 (11 Jul 2023 05:07) (16 - 18)  SpO2: 97% (11 Jul 2023 05:07) (95% - 97%)    Parameters below as of 11 Jul 2023 05:07  Patient On (Oxygen Delivery Method): room air                  LABS:                        11.5   19.81 )-----------( 465      ( 10 Jul 2023 09:00 )             36.7     07-10    133<L>  |  96  |  113<H>  ----------------------------<  342<H>  5.9<H>   |  23  |  8.10<H>    Ca    10.4<H>      10 Jul 2023 09:00  Mg     2.9     07-10        Urinalysis Basic - ( 10 Jul 2023 09:00 )    Color: x / Appearance: x / SG: x / pH: x  Gluc: 342 mg/dL / Ketone: x  / Bili: x / Urobili: x   Blood: x / Protein: x / Nitrite: x   Leuk Esterase: x / RBC: x / WBC x   Sq Epi: x / Non Sq Epi: x / Bacteria: x            WBC:  WBC Count: 19.81 K/uL (07-10 @ 09:00)  WBC Count: 18.01 K/uL (07-09 @ 07:07)  WBC Count: 15.89 K/uL (07-07 @ 07:27)      MICROBIOLOGY:  RECENT CULTURES:                  Sodium:  Sodium: 133 mmol/L (07-10 @ 09:00)  Sodium: 133 mmol/L (07-09 @ 07:07)  Sodium: 131 mmol/L (07-07 @ 07:27)      8.10 mg/dL 07-10 @ 09:00  6.40 mg/dL 07-09 @ 07:07  5.30 mg/dL 07-07 @ 07:27      Hemoglobin:  Hemoglobin: 11.5 g/dL (07-10 @ 09:00)  Hemoglobin: 12.3 g/dL (07-09 @ 07:07)  Hemoglobin: 12.3 g/dL (07-07 @ 07:27)      Platelets: Platelet Count - Automated: 465 K/uL (07-10 @ 09:00)  Platelet Count - Automated: 461 K/uL (07-09 @ 07:07)  Platelet Count - Automated: 442 K/uL (07-07 @ 07:27)          Urinalysis Basic - ( 10 Jul 2023 09:00 )    Color: x / Appearance: x / SG: x / pH: x  Gluc: 342 mg/dL / Ketone: x  / Bili: x / Urobili: x   Blood: x / Protein: x / Nitrite: x   Leuk Esterase: x / RBC: x / WBC x   Sq Epi: x / Non Sq Epi: x / Bacteria: x        RADIOLOGY & ADDITIONAL STUDIES:      MICROBIOLOGY:  RECENT CULTURES:

## 2023-07-11 NOTE — PROGRESS NOTE ADULT - ASSESSMENT
fx rt pelvis with hematoma- hold eliquis  s/p fall  ashd - s/p cabg  s/p mi  s/p stent  esrd - on hd  dm2  hypertension  paf  depression  pvd  discussed with spouse 7/1  repeat xray chest follow up effusion 7/11

## 2023-07-11 NOTE — PROGRESS NOTE ADULT - ASSESSMENT
73YO F PMH hypertension, diabetes, dementia, end-stage renal disease on hemodialysis, who presented status post unwitnessed fall from [facility]- rolled out of bed. Patient complained of right hip pain--found to have fractures including the medial wall of the acetabulum and inferior right pubic ramus. Patient found to have leukocytosis, likely reactive 2/2 hip fracture, although started on empiric cefepime on 7/4 given new fever.    Patient being evaluated for persistent leukocytosis, WBC increased to 26. Today noted to have L parietal parenchymal hemorrhage, leukocytosis could be reactive from this. no evidence of hip hematoma or collection. Urine culture agrew CRE pseudomonas and ESBL klebsiella, although no improvement in WBC with meropenem.    #Leukocytosis  #Positive urine culture    -will give a dose of vancomycin and start empiric ceftolozane-tazobactam (renally dosed)  -follow repeat blood cultures  -will check MRSA nasal PCR  -aspiration precautions  -wound care  -plan for thoracentesis  -discussed with Dr. Norbert Tsai MD  Division of infectious Diseases  Cell [phone] between 8am and 6pm  After 6pm and over the weekends please call ID service line at [phone].

## 2023-07-11 NOTE — PROGRESS NOTE ADULT - SUBJECTIVE AND OBJECTIVE BOX
Patient is a 72y Female whom presented to the hospital with esrd on hd     PAST MEDICAL & SURGICAL HISTORY:  HTN (hypertension)      h/o Anxiety attack      Depression      h/o Myocardial infarct 2007      h/o Hepatitis A 1969  currently resolved, no symptoms      Murmur, cardiac      h/o Smoking  quitted 3/2012      Anemia secondary to renal failure      ESRD on dialysis      Falls      Ataxia      Type 2 diabetes mellitus      Peripheral vascular disease, unspecified      CAD (coronary artery disease)      Diabetes      coronary stent 2007      s/p Ovarian cyst removal      s/p surgical removal of benign Skin lesion epigastric area      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 650 milliGRAM(s) Oral every 6 hours  aspirin enteric coated 81 milliGRAM(s) Oral daily  cloNIDine 0.1 milliGRAM(s) Oral two times a day  dextrose 5%. 1000 milliLiter(s) (50 mL/Hr) IV Continuous <Continuous>  dextrose 5%. 1000 milliLiter(s) (100 mL/Hr) IV Continuous <Continuous>  dextrose 50% Injectable 25 Gram(s) IV Push once  dextrose 50% Injectable 12.5 Gram(s) IV Push once  dextrose 50% Injectable 25 Gram(s) IV Push once  diltiazem    Tablet 60 milliGRAM(s) Oral three times a day  dronabinol 2.5 milliGRAM(s) Oral two times a day before meals  glucagon  Injectable 1 milliGRAM(s) IntraMuscular once  imipramine 50 milliGRAM(s) Oral daily  insulin lispro (ADMELOG) corrective regimen sliding scale   SubCutaneous three times a day before meals  metoprolol tartrate 100 milliGRAM(s) Oral two times a day  mirtazapine 7.5 milliGRAM(s) Oral at bedtime  multivitamin 1 Tablet(s) Oral daily  pantoprazole    Tablet 40 milliGRAM(s) Oral before breakfast  senna 2 Tablet(s) Oral at bedtime  sodium chloride 0.45%. 1000 milliLiter(s) (50 mL/Hr) IV Continuous <Continuous>      Allergies    No Known Drug Allergies  latex (Hives)    Intolerances                                  SOCIAL HISTORY:  Denies ETOh,Smoking,     FAMILY HISTORY:  FH: myocardial infarction (Father)    FH: myocardial infarction (Father)    Family history of type 2 diabetes mellitus in brother (Sibling)        REVIEW OF SYSTEMS:    unable to obtained a good review system                                               12.4   19.09 )-----------( 490      ( 11 Jul 2023 08:33 )             38.5       CBC Full  -  ( 11 Jul 2023 08:33 )  WBC Count : 19.09 K/uL  RBC Count : 4.00 M/uL  Hemoglobin : 12.4 g/dL  Hematocrit : 38.5 %  Platelet Count - Automated : 490 K/uL  Mean Cell Volume : 96.3 fl  Mean Cell Hemoglobin : 31.0 pg  Mean Cell Hemoglobin Concentration : 32.2 gm/dL  Auto Neutrophil # : x  Auto Lymphocyte # : x  Auto Monocyte # : x  Auto Eosinophil # : x  Auto Basophil # : x  Auto Neutrophil % : x  Auto Lymphocyte % : x  Auto Monocyte % : x  Auto Eosinophil % : x  Auto Basophil % : x      07-11    135  |  97  |  137<H>  ----------------------------<  316<H>  5.6<H>   |  19<L>  |  9.30<H>    Ca    10.2<H>      11 Jul 2023 08:33  Mg     3.1     07-11        CAPILLARY BLOOD GLUCOSE      POCT Blood Glucose.: 282 mg/dL (11 Jul 2023 08:46)  POCT Blood Glucose.: 240 mg/dL (10 Jul 2023 21:22)  POCT Blood Glucose.: 201 mg/dL (10 Jul 2023 16:48)  POCT Blood Glucose.: 200 mg/dL (10 Jul 2023 12:25)      Vital Signs Last 24 Hrs  T(C): 36.8 (11 Jul 2023 05:07), Max: 36.8 (11 Jul 2023 05:07)  T(F): 98.3 (11 Jul 2023 05:07), Max: 98.3 (11 Jul 2023 05:07)  HR: 83 (11 Jul 2023 05:07) (80 - 92)  BP: 134/77 (11 Jul 2023 05:07) (104/81 - 159/84)  BP(mean): --  RR: 16 (11 Jul 2023 05:07) (16 - 18)  SpO2: 97% (11 Jul 2023 05:07) (95% - 97%)    Parameters below as of 11 Jul 2023 05:07  Patient On (Oxygen Delivery Method): room air        Urinalysis Basic - ( 11 Jul 2023 08:33 )    Color: x / Appearance: x / SG: x / pH: x  Gluc: 316 mg/dL / Ketone: x  / Bili: x / Urobili: x   Blood: x / Protein: x / Nitrite: x   Leuk Esterase: x / RBC: x / WBC x   Sq Epi: x / Non Sq Epi: x / Bacteria: x                                  PHYSICAL EXAM:    Constitutional: NAD  HEENT: conjunctive   clear   Neck:  No JVD  Respiratory: CTAB  Cardiovascular: S1 and S2  Gastrointestinal: BS+, soft, NT/ND  Extremities: No peripheral edema  Neurological:  no focal deficits  pos fistula

## 2023-07-12 DIAGNOSIS — L89.154 PRESSURE ULCER OF SACRAL REGION, STAGE 4: ICD-10-CM

## 2023-07-12 DIAGNOSIS — G93.40 ENCEPHALOPATHY, UNSPECIFIED: ICD-10-CM

## 2023-07-12 DIAGNOSIS — I61.9 NONTRAUMATIC INTRACEREBRAL HEMORRHAGE, UNSPECIFIED: ICD-10-CM

## 2023-07-12 LAB
ALBUMIN SERPL ELPH-MCNC: 2.8 G/DL — LOW (ref 3.3–5)
ALP SERPL-CCNC: 128 U/L — HIGH (ref 40–120)
ALT FLD-CCNC: 21 U/L — SIGNIFICANT CHANGE UP (ref 12–78)
ANION GAP SERPL CALC-SCNC: 14 MMOL/L — SIGNIFICANT CHANGE UP (ref 5–17)
AST SERPL-CCNC: 32 U/L — SIGNIFICANT CHANGE UP (ref 15–37)
BILIRUB SERPL-MCNC: 0.5 MG/DL — SIGNIFICANT CHANGE UP (ref 0.2–1.2)
BUN SERPL-MCNC: 111 MG/DL — HIGH (ref 7–23)
CALCIUM SERPL-MCNC: 10.3 MG/DL — HIGH (ref 8.5–10.1)
CHLORIDE SERPL-SCNC: 93 MMOL/L — LOW (ref 96–108)
CO2 SERPL-SCNC: 26 MMOL/L — SIGNIFICANT CHANGE UP (ref 22–31)
CREAT SERPL-MCNC: 7.9 MG/DL — HIGH (ref 0.5–1.3)
EGFR: 5 ML/MIN/1.73M2 — LOW
GLUCOSE SERPL-MCNC: 222 MG/DL — HIGH (ref 70–99)
HCT VFR BLD CALC: 35.1 % — SIGNIFICANT CHANGE UP (ref 34.5–45)
HGB BLD-MCNC: 11.4 G/DL — LOW (ref 11.5–15.5)
INR BLD: 1.25 RATIO — HIGH (ref 0.88–1.16)
LDH SERPL L TO P-CCNC: 287 U/L — HIGH (ref 50–242)
MCHC RBC-ENTMCNC: 31.1 PG — SIGNIFICANT CHANGE UP (ref 27–34)
MCHC RBC-ENTMCNC: 32.5 GM/DL — SIGNIFICANT CHANGE UP (ref 32–36)
MCV RBC AUTO: 95.9 FL — SIGNIFICANT CHANGE UP (ref 80–100)
MRSA PCR RESULT.: DETECTED
MRSA PCR RESULT.: DETECTED
NRBC # BLD: 0 /100 WBCS — SIGNIFICANT CHANGE UP (ref 0–0)
PLATELET # BLD AUTO: 544 K/UL — HIGH (ref 150–400)
POTASSIUM SERPL-MCNC: 5.3 MMOL/L — SIGNIFICANT CHANGE UP (ref 3.5–5.3)
POTASSIUM SERPL-SCNC: 5.3 MMOL/L — SIGNIFICANT CHANGE UP (ref 3.5–5.3)
PROLACTIN SERPL-MCNC: 53.2 NG/ML — HIGH (ref 3.4–24.1)
PROT SERPL-MCNC: 7.7 G/DL — SIGNIFICANT CHANGE UP (ref 6–8.3)
PROTHROM AB SERPL-ACNC: 14.6 SEC — HIGH (ref 10.5–13.4)
RBC # BLD: 3.66 M/UL — LOW (ref 3.8–5.2)
RBC # FLD: 16.1 % — HIGH (ref 10.3–14.5)
S AUREUS DNA NOSE QL NAA+PROBE: DETECTED
S AUREUS DNA NOSE QL NAA+PROBE: DETECTED
SODIUM SERPL-SCNC: 133 MMOL/L — LOW (ref 135–145)
WBC # BLD: 20.17 K/UL — HIGH (ref 3.8–10.5)
WBC # FLD AUTO: 20.17 K/UL — HIGH (ref 3.8–10.5)

## 2023-07-12 PROCEDURE — 99232 SBSQ HOSP IP/OBS MODERATE 35: CPT

## 2023-07-12 PROCEDURE — 99291 CRITICAL CARE FIRST HOUR: CPT

## 2023-07-12 PROCEDURE — 99233 SBSQ HOSP IP/OBS HIGH 50: CPT

## 2023-07-12 PROCEDURE — 70551 MRI BRAIN STEM W/O DYE: CPT | Mod: 26

## 2023-07-12 RX ORDER — INSULIN GLARGINE 100 [IU]/ML
15 INJECTION, SOLUTION SUBCUTANEOUS AT BEDTIME
Refills: 0 | Status: DISCONTINUED | OUTPATIENT
Start: 2023-07-12 | End: 2023-07-13

## 2023-07-12 RX ORDER — MUPIROCIN 20 MG/G
1 OINTMENT TOPICAL
Refills: 0 | Status: COMPLETED | OUTPATIENT
Start: 2023-07-12 | End: 2023-07-16

## 2023-07-12 RX ORDER — ASPIRIN/CALCIUM CARB/MAGNESIUM 324 MG
81 TABLET ORAL DAILY
Refills: 0 | Status: DISCONTINUED | OUTPATIENT
Start: 2023-07-12 | End: 2023-07-14

## 2023-07-12 RX ORDER — CEFTOLOZANE AND TAZOBACTAM 1; .5 G/10ML; G/10ML
150 INJECTION, POWDER, LYOPHILIZED, FOR SOLUTION INTRAVENOUS EVERY 8 HOURS
Refills: 0 | Status: COMPLETED | OUTPATIENT
Start: 2023-07-12 | End: 2023-07-17

## 2023-07-12 RX ORDER — MIDODRINE HYDROCHLORIDE 2.5 MG/1
10 TABLET ORAL THREE TIMES A DAY
Refills: 0 | Status: DISCONTINUED | OUTPATIENT
Start: 2023-07-12 | End: 2023-07-16

## 2023-07-12 RX ORDER — SODIUM CHLORIDE 9 MG/ML
500 INJECTION, SOLUTION INTRAVENOUS ONCE
Refills: 0 | Status: COMPLETED | OUTPATIENT
Start: 2023-07-12 | End: 2023-07-12

## 2023-07-12 RX ADMIN — LIDOCAINE 1 PATCH: 4 CREAM TOPICAL at 23:13

## 2023-07-12 RX ADMIN — Medication 2.5 MILLIGRAM(S): at 06:00

## 2023-07-12 RX ADMIN — Medication 2: at 05:32

## 2023-07-12 RX ADMIN — SODIUM ZIRCONIUM CYCLOSILICATE 5 GRAM(S): 10 POWDER, FOR SUSPENSION ORAL at 12:00

## 2023-07-12 RX ADMIN — MUPIROCIN 1 APPLICATION(S): 20 OINTMENT TOPICAL at 17:40

## 2023-07-12 RX ADMIN — INSULIN GLARGINE 15 UNIT(S): 100 INJECTION, SOLUTION SUBCUTANEOUS at 23:07

## 2023-07-12 RX ADMIN — Medication 4: at 12:09

## 2023-07-12 RX ADMIN — CEFTOLOZANE AND TAZOBACTAM 100 MILLIGRAM(S): 1; .5 INJECTION, POWDER, LYOPHILIZED, FOR SOLUTION INTRAVENOUS at 06:01

## 2023-07-12 RX ADMIN — CEFTOLOZANE AND TAZOBACTAM 100 MILLIGRAM(S): 1; .5 INJECTION, POWDER, LYOPHILIZED, FOR SOLUTION INTRAVENOUS at 00:29

## 2023-07-12 RX ADMIN — CHLORHEXIDINE GLUCONATE 1 APPLICATION(S): 213 SOLUTION TOPICAL at 05:34

## 2023-07-12 RX ADMIN — LIDOCAINE 1 PATCH: 4 CREAM TOPICAL at 21:39

## 2023-07-12 RX ADMIN — SENNA PLUS 2 TABLET(S): 8.6 TABLET ORAL at 21:32

## 2023-07-12 RX ADMIN — CEFTOLOZANE AND TAZOBACTAM 100 MILLIGRAM(S): 1; .5 INJECTION, POWDER, LYOPHILIZED, FOR SOLUTION INTRAVENOUS at 15:46

## 2023-07-12 RX ADMIN — MUPIROCIN 1 APPLICATION(S): 20 OINTMENT TOPICAL at 14:34

## 2023-07-12 RX ADMIN — Medication 50 MILLIGRAM(S): at 14:34

## 2023-07-12 RX ADMIN — SODIUM CHLORIDE 500 MILLILITER(S): 9 INJECTION, SOLUTION INTRAVENOUS at 11:23

## 2023-07-12 RX ADMIN — Medication 100 MILLIGRAM(S): at 05:57

## 2023-07-12 RX ADMIN — Medication 2.5 MILLIGRAM(S): at 16:47

## 2023-07-12 RX ADMIN — MIDODRINE HYDROCHLORIDE 10 MILLIGRAM(S): 2.5 TABLET ORAL at 16:47

## 2023-07-12 RX ADMIN — MIDODRINE HYDROCHLORIDE 10 MILLIGRAM(S): 2.5 TABLET ORAL at 12:35

## 2023-07-12 RX ADMIN — PANTOPRAZOLE SODIUM 40 MILLIGRAM(S): 20 TABLET, DELAYED RELEASE ORAL at 05:31

## 2023-07-12 RX ADMIN — Medication 1 DROP(S): at 05:28

## 2023-07-12 RX ADMIN — Medication 1 DROP(S): at 17:41

## 2023-07-12 RX ADMIN — CEFTOLOZANE AND TAZOBACTAM 100 MILLIGRAM(S): 1; .5 INJECTION, POWDER, LYOPHILIZED, FOR SOLUTION INTRAVENOUS at 23:12

## 2023-07-12 RX ADMIN — MIRTAZAPINE 7.5 MILLIGRAM(S): 45 TABLET, ORALLY DISINTEGRATING ORAL at 21:31

## 2023-07-12 RX ADMIN — Medication 60 MILLIGRAM(S): at 21:31

## 2023-07-12 RX ADMIN — LIDOCAINE 1 PATCH: 4 CREAM TOPICAL at 12:00

## 2023-07-12 RX ADMIN — Medication 81 MILLIGRAM(S): at 12:01

## 2023-07-12 NOTE — PROGRESS NOTE ADULT - ASSESSMENT
Patient is 73F with a PMH of HTN, DM, dementia, ESRD on HD who presented with suspicion for a brain bleed due to CT head demonstrating an area of potential hemorrhage and was admitted to the ICU for intensive BP monitoring.    Neuro:  -r/o brain hemorrhage  -MRI ordered to further evaluate and r/o hemorrhage  -continue aspirin per neurology    CV:  -history of HTN  -goal blood pressure achieved  -continue anti-hypertensive medications: clonidine metoprolol and diltiazem    Pulm:  -stable condition    GI:  -NPO diet    Renal:  -ESRD on HD  -HD per renal    ID:  -Carbepenem resistant Pesudonomas/Klebsiella ESBL growth in UC  -Continue Zebraxa  -d/c vancomycin  -f/u ID recs    Heme:  -SCDs for DVT ppx    Endo:  -DM  -Continue Lantus and ISS  -Continue to monitor glucose levels

## 2023-07-12 NOTE — CONSULT NOTE ADULT - CONSULT REQUESTED BY NAME
Amadeo
dr edda castro
DR PRESTON
KATI Larson
dr castro
Dr Diamond Larson
ED
Dr. EUGENE Larson
Dr. Diamond Larson

## 2023-07-12 NOTE — PROGRESS NOTE ADULT - SUBJECTIVE AND OBJECTIVE BOX
Date of Service: 07-12-23 @ 16:10    Patient is a 72y old  Female who presents with a chief complaint of s/p fall (12 Jul 2023 14:20)      INTERVAL HPI/OVERNIGHT EVENTS: Events noted    Vital Signs Last 24 Hrs  T(C): 37.3 (12 Jul 2023 11:47), Max: 37.4 (12 Jul 2023 04:44)  T(F): 99.1 (12 Jul 2023 11:47), Max: 99.3 (12 Jul 2023 04:44)  HR: 80 (12 Jul 2023 16:00) (77 - 99)  BP: 110/52 (12 Jul 2023 16:00) (76/57 - 187/87)  BP(mean): 70 (12 Jul 2023 16:00) (58 - 95)  RR: 17 (12 Jul 2023 16:00) (16 - 51)  SpO2: 98% (12 Jul 2023 16:00) (94% - 100%)    Parameters below as of 12 Jul 2023 08:00  Patient On (Oxygen Delivery Method): room air        07-12    133<L>  |  93<L>  |  111<H>  ----------------------------<  222<H>  5.3   |  26  |  7.90<H>    Ca    10.3<H>      12 Jul 2023 05:42  Mg     3.1     07-11    TPro  7.7  /  Alb  2.8<L>  /  TBili  0.5  /  DBili  x   /  AST  32  /  ALT  21  /  AlkPhos  128<H>  07-12                          11.4   20.17 )-----------( 544      ( 12 Jul 2023 05:42 )             35.1     PT/INR - ( 12 Jul 2023 05:42 )   PT: 14.6 sec;   INR: 1.25 ratio         PTT - ( 11 Jul 2023 17:09 )  PTT:21.3 sec  CAPILLARY BLOOD GLUCOSE      POCT Blood Glucose.: 241 mg/dL (12 Jul 2023 12:06)  POCT Blood Glucose.: 195 mg/dL (12 Jul 2023 05:30)  POCT Blood Glucose.: 326 mg/dL (11 Jul 2023 23:37)  POCT Blood Glucose.: 304 mg/dL (11 Jul 2023 22:43)  POCT Blood Glucose.: 254 mg/dL (11 Jul 2023 19:10)  POCT Blood Glucose.: 229 mg/dL (11 Jul 2023 17:02)    Urinalysis Basic - ( 12 Jul 2023 05:42 )    Color: x / Appearance: x / SG: x / pH: x  Gluc: 222 mg/dL / Ketone: x  / Bili: x / Urobili: x   Blood: x / Protein: x / Nitrite: x   Leuk Esterase: x / RBC: x / WBC x   Sq Epi: x / Non Sq Epi: x / Bacteria: x              acetaminophen     Tablet .. 650 milliGRAM(s) Oral every 6 hours PRN  artificial  tears Solution 1 Drop(s) Both EYES every 12 hours  aspirin enteric coated 81 milliGRAM(s) Oral daily  ceftolozane/tazobactam IVPB 150 milliGRAM(s) IV Intermittent every 8 hours  chlorhexidine 2% Cloths 1 Application(s) Topical <User Schedule>  dextrose 5%. 1000 milliLiter(s) IV Continuous <Continuous>  dextrose 5%. 1000 milliLiter(s) IV Continuous <Continuous>  dextrose 50% Injectable 12.5 Gram(s) IV Push once  dextrose 50% Injectable 25 Gram(s) IV Push once  dextrose 50% Injectable 25 Gram(s) IV Push once  dextrose Oral Gel 15 Gram(s) Oral once PRN  diltiazem    Tablet 60 milliGRAM(s) Oral three times a day  dronabinol 2.5 milliGRAM(s) Oral two times a day before meals  glucagon  Injectable 1 milliGRAM(s) IntraMuscular once  imipramine 50 milliGRAM(s) Oral daily  insulin glargine Injectable (LANTUS) 15 Unit(s) SubCutaneous at bedtime  insulin lispro (ADMELOG) corrective regimen sliding scale   SubCutaneous every 6 hours  lidocaine   4% Patch 1 Patch Transdermal daily  metoprolol tartrate 100 milliGRAM(s) Oral two times a day  midodrine. 10 milliGRAM(s) Oral three times a day  mirtazapine 7.5 milliGRAM(s) Oral at bedtime  mupirocin 2% Nasal 1 Application(s) Both Nostrils two times a day  pantoprazole    Tablet 40 milliGRAM(s) Oral before breakfast  polyethylene glycol 3350 17 Gram(s) Oral daily  senna 2 Tablet(s) Oral at bedtime  sodium zirconium cyclosilicate 5 Gram(s) Oral daily              REVIEW OF SYSTEMS: unobtainable      Consultant(s) Notes Reviewed:  [X] YES  [ ] NO    PHYSICAL EXAM:  GENERAL: NAD  HEAD:  Atraumatic, Normocephalic  NECK: Supple, No JVD  CHEST/LUNG: decreased BS b/l  HEART: Regular rate and rhythm  ABDOMEN: Soft, Nontender, Nondistended; Bowel sounds present  EXTREMITIES:  No clubbing, cyanosis, or edema        Advanced care planning discussed with patient/family [X] YES   [ ] NO    Advanced care planning discussed with patient/family. Patient's health status was discussed. All appropriate changes have been made regarding patient's end-of-life care. Advanced care planning forms reviewed/discussed/completed.  20 minutes spent.

## 2023-07-12 NOTE — CONSULT NOTE ADULT - CONSULT REQUESTED DATE/TIME
01-Jul-2023 11:00
01-Jul-2023
01-Jul-2023 13:20
01-Jul-2023 14:20
02-Jul-2023 14:37
11-Jul-2023 20:56
12-Jul-2023
08-Jul-2023 20:57
07-Jul-2023 17:01

## 2023-07-12 NOTE — PHARMACOTHERAPY INTERVENTION NOTE - COMMENTS
Blood Glucose remains > target of 140-180.  Discussed with Dr. Ag on rounds.  Dr Ag changed to 15 units HS lantus from 10 units.  Note: Endocrine Dr. Perlman had previously put patient on 15 units on 7/10.
MRSA swab positive result.  Discussed with Dr. Romeo in ICU.  Took verbal order for mupirocin 2% nasal ointment for 5 day course for decolonization.

## 2023-07-12 NOTE — PROGRESS NOTE ADULT - ASSESSMENT
REASON FOR VISIT  .. Management of problems listed below      NOTEWORTHY FINDINGS.     PHYSICAL EXAM    HEENT Unremarkable  atraumatic   RESP Fair air entry  Harsh breath sound   CARDIAC S1 S2 No S3     NO JVD    ABDOMEN No hepatosplenomegaly   PEDAL EDEMA present No calf tenderness  NO rash       GENERAL DATA .     GOC.    . prior  ICU STAY.   .. 7/11/2023   COVID.   ..    BEST PRACTICE ISSUES.    HOB ELEVATN.   .. Yes  DVT PPLX.   .. 7/10 apixa held for thora  .. 7/3/2023 apixa 2.5 x2 restarted as IR feels we should tap fluid only if she develops shortness of breath  ..    7/2/2023 Apixaba 2.5 x2 held for thorac   STRESS ULCER PPLX.   ..      INFECTION PPLX.   ..  7/11 chlorhexidine 2%  SP SW AMINAH.    ..        DIET.    .. 7/11 npo   ..  7/4/2023 mince moist dc ed   IV fl.  ..     PROCEDURES.  ..   PAST PROCEDURES.    ..   GENERAL DATA .   ALLGY.  ..     latex                     WT.  ..  7/1/2023 54  BMI.  ..    7/1/2023 17     ABGS.  . 7/11/2023 7p 3l 743/34/182     VS/ PO/IO/ VENT/ DRIPS  7/12/2023 99 f 86/40     CC/DOA.        . 7/1/2023 Pt sent from [facility] for unwitnessed fall out of bed.       .  PRESENTATION.   . 7/1/2023 72-year-old female former smoker quit 3/2012  with PMH for hypertension, CAD sp cabg  PVD   sp R-> L bifem - fem BK pop bypass  Fem pop bypass 6/21/2022  Partial ray amputation r great toe 6/22/2022  diabetes, dementia, ESRD on hemodialysis   a fib on eliquis sp recent hosp stay 5/17-5/24/2023     . ENTERORHINOVIRUS INFECTION RESP TRACT 5/17  . PLEURAL EFFUSION SP l THORA 5/18 TRANSUDATE     COURSE   . ACETABULAR Fx Ortho recommended non surgical mgmt  . PL EFFSN Was initially decided to hold off thora unless she develops sob  . LEUKOCYTOSIS 7/11/2023 dw ID will plan thorac to exclude infecn  . ALTERED MENTAL STATE 7/11 Tr to icu poss ic bleed     PROBLEMS/ASSESSMENT/RECOMMENDATIONS (A/R).  PULMONARY.  . GAS EXCHANGE.  .. A/R.   .. Monitor pulse ox and target po 90-95%    . PLEURAL EFFSN.  .. RELEVANT PAST HISTORU  .. 5/18/2023 l thorac 1.5 l   .. 5/18 pl fl l 13 m 73 p 5 afb sm (-) g 131 l 102/268 (.38) ph 7.6 pr 2.6/6.4 (.4)  .. Fluid transudate  .. WORKUP   .. CXR 7/1/2023 cxr Mod large l pl effs or lower zone airspace consolidation/atelecatsis   .. CT ch 7/1/2023 Ct ch   .... Decreased mod l effs since 5/17/2023   .. EVENTS  .. 7/2/2023 DW pt and dw son consensus is that they want fluid remived   .. 7/3/2023 dw ir plan changed plan is to tap if she becomes symptimatic   .. 7/3/2023 Thora cancelled as pt is comfortable will pan thora if she becomes symptomatic   .. 7/10/2023 as pt has persistent leukocytosis if no impovement will need thora so apixa 2.5 bid staoppd   .. A/R.  . 7/11/2023  thora diagn orderd    ID.  . ESBL UTI.  .. WORKUP.  .. W 7/1-7/2-7/3-7/6-7/7-7/9-7/10-7/11-7/12/2023 W 15 - 14 - 13- 13 - 15- 18 - 19 - 19 - 20   .. EVENTS.   .. 7/1/2023 Checlk blood and urine cs   .. MICRO.  ..  7/3/2023 100 k pseudomonas carbapenem resist    ..  7/3/2023 50-99 Klebs esbl   .. bc 7/10 (-)   .. ABIO.  .. 7/8/2023 meropenem 500 x 3d Dr ALCAZAR dced   .. 7/11 ceftolozane tazobactam 150.3 zerbaxa x 2 do     . PERSISTENT LEUKOCYTOSIS.  .. w 7/11/2023 w 19  .. ct hip r 7/11/2023   .... no hematoma or fluid collectn   .... r acetabular fc 2.1 x 3.4 cm cortical stepoff   .. EVENTS.  .. 7/11/2023 dw id  will plan thorac   .. 7/11 ceftolozane tazobactam 150.3 zerbaxa x 2 do   .. A/R.  .. 7/12/2023 pt now in icu sec possible ic bleed   .. 7/12/2023 Thoracentesis scheduling will be decided by icu     CARDIO.  . HEMODYNAMICS.  .. 7/8/2023 midodrine 10.3 Dr CLIFFORD   .. target map 65 (+)     . CAD.  .. 7/1/2023 ASA 81   .. A/R  .. cont rx    . A fib.  .. 7/1/2023 CARDIZEM 60.3   .. 7/3/2023 apixaba 2.5 x2   .. 7/10 apixa held  .. AR  .. Cont rx    HEMAT.  .. WORKUP.  .. Hb 7/1-7/2-7/3-7/6-7/9-7/11-7/12/2023 Hb 13 - 11.8 - 11.2 - 11- 12 -12.4- 11.4    .. INR 7/1/2023    .. A/R  .. As pt was on apixaba will monitorserially     RENAL.  . ESRD.  .. WORKUP  .. NA 7/1/2023    .. K 7/1/2023 K 5.1   .. CR 7/1/2023 CR 5  .. HD dependent     ORTHO.  . FALL 7/1/2023.  .. CT head C sp 7/1/2023 CT HC (-)     . FRACTURE ACETABULUM r INFERIOR PUBIC RAMUS r 7/1/2023   .. IMAGING.  .. XR Pelvis and r hip 7/1/2023 XR Fractures r acetabulum and inf r pubic ramus   .. 7/1/2023 ct pelvix   .... comminuted r acetabular fx involving r sup and inf pubic rami medial wallacetabulum sup acetabulum and post wall acetabulum   .... small hematoma r pelvic sidewall   .. ORTHO.  .. 7/1/2023 KWABENA Dias She spoke to Ortho Dr Jenkins group and plan is for nonsurgical management  .. A/R  .. Monitor serial Hb ro bleed     . IC BLEED 7/11.  .. ct head 7/11 cw cta h 7/11   .... 5 mm hyperattenuating curvilinear focus l parietal lobe may be sl increasd from 3 mm on 7/11 520p may represent small focus of hrrge v contrast training in ac infacrt  .. may need transfer tertiary center      . ALTERED MENTAL STATE 7/11  .. 7/11/2023  Tr to icu poss ic bleed for q h neurochecl     . MAIN ISSUES  .. FX acetabulum on conservative rx  . A fib on doac  . ESRD   . UTI 7/8/2023 started on meropenem by ID (UTI)   . 7/10/2023 If leukocytosis persists will plan thorac apixa stoppd   . LEUKOCYTOSIS 7/11/2023 dw ID will plan thorac to exclude infecn  7/11 ceftolozane/tazobactam 150.3 x 2 do   . ALTERED MENTAL STATE 7/11 Tr to icu poss ic bleed tr to tertiary being considered     TIME SPENT   . About 36 minutes aggregate care time spent on encounter; activities included   direct patient care, counseling and/or coordinating care reviewing notes, lab data/ imaging , discussion with multidisciplinary team/ patient  /family and explaining in detail risks, benefits, alternatives  of the recommendations     JOSE F LAO 72 f7/1/2023 1948 DR HARRY ALBRIGHT

## 2023-07-12 NOTE — PROGRESS NOTE ADULT - ASSESSMENT
72-year-old female former smoker quit 3/2012  with significant past medical history for hypertension, CAD sp cabg  PVD  diabetes, dementia, end-stage renal disease on hemodialysis   a fib on eliquis sp recent hosp stay 5/17-5/24/2023  admitted 3/7-3/11/2023 and before that 2/22-2/25/2023   . During 5/2023 admission she had   . ENTERORHINOVIRUS INFECTION RESP TRACT 5/17  . PLEURAL EFFUSION SP l THORA 5/18 TRANSUDATE   Patient now  presented to the ED 7/1/2023 status post unwitnessed fall from [facility].  Patient states that she heard some voices then rolled out of bed.  Patient complaining of right hip pain.  Unknown head trauma.  + Ashleealdois     She was found to have acetabular fracture which Ortho was contacted and they plan non operative management Pt was admitted to [facility] for bleed as she was on apixaban and was noted to have pl effsn    . 7/1/2023  I was asked to admit pt                      (01 Jul 2023 13:32)      esrd on hd plan for hd in am pt transfer to icu       ANEMIA PLAN:  Anemia of chronic disease:  We will continue epo aiming for a HCT of 32-36 %.   We will monitor Iron stores, B12 and RBC folate .      BP monitoring,continue current midodrine ,    Accuchecks monitoring and insulin sliding scale coverage, no concentrated sweets diet, serial labs and f/up with PMD, monitor HB A 1 C every 3-4 mnth

## 2023-07-12 NOTE — PROGRESS NOTE ADULT - ASSESSMENT
72-year-old female with significant past medical history for hypertension, diabetes, dementia, end-stage renal disease on hemodialysis presents to the ED status post unwitnessed fall from [facility].  Patient states that she heard some voices then rolled out of bed.  Patient complaining of right hip pain.  Unknown head trauma.  + Eliquis < from: Xray Femur showed  Fractures including the medial wall of the acetabulum and inferior right pubic ramus Admitted for pain management ,PT/OT

## 2023-07-12 NOTE — PROGRESS NOTE ADULT - PROBLEM SELECTOR PLAN 2
Blood cultures currently no growth. CXR shows moderate-large L pleural effusion--although she remains on room air and no obvious respiratory symptoms.   -No fever but persistent leukocytosis  -CT no hematoma  -may need thoracentesis  -now on Zerbaxa  -monitor WBC  -aspiration precautions  -wound care

## 2023-07-12 NOTE — PROGRESS NOTE ADULT - SUBJECTIVE AND OBJECTIVE BOX
CAPILLARY BLOOD GLUCOSE      POCT Blood Glucose.: 134 mg/dL (12 Jul 2023 17:43)  POCT Blood Glucose.: 241 mg/dL (12 Jul 2023 12:06)  POCT Blood Glucose.: 195 mg/dL (12 Jul 2023 05:30)  POCT Blood Glucose.: 326 mg/dL (11 Jul 2023 23:37)  POCT Blood Glucose.: 304 mg/dL (11 Jul 2023 22:43)      Vital Signs Last 24 Hrs  T(C): 36.7 (12 Jul 2023 16:30), Max: 37.4 (12 Jul 2023 04:44)  T(F): 98.1 (12 Jul 2023 16:30), Max: 99.3 (12 Jul 2023 04:44)  HR: 92 (12 Jul 2023 19:30) (77 - 99)  BP: 126/64 (12 Jul 2023 19:05) (76/57 - 173/110)  BP(mean): 78 (12 Jul 2023 19:05) (58 - 132)  RR: 28 (12 Jul 2023 19:30) (17 - 51)  SpO2: 98% (12 Jul 2023 19:30) (94% - 100%)    Parameters below as of 12 Jul 2023 08:00  Patient On (Oxygen Delivery Method): room air        Respiratory: CTA B/L  CV: RRR, S1S2, no murmurs, rubs or gallops  Abdominal: Soft, NT, ND +BS, Last BM  Extremities: No edema, + peripheral pulses     07-12    133<L>  |  93<L>  |  111<H>  ----------------------------<  222<H>  5.3   |  26  |  7.90<H>    Ca    10.3<H>      12 Jul 2023 05:42  Mg     3.1     07-11    TPro  7.7  /  Alb  2.8<L>  /  TBili  0.5  /  DBili  x   /  AST  32  /  ALT  21  /  AlkPhos  128<H>  07-12      dextrose 50% Injectable 12.5 Gram(s) IV Push once  dextrose 50% Injectable 25 Gram(s) IV Push once  dextrose 50% Injectable 25 Gram(s) IV Push once  dextrose Oral Gel 15 Gram(s) Oral once PRN  glucagon  Injectable 1 milliGRAM(s) IntraMuscular once  insulin glargine Injectable (LANTUS) 15 Unit(s) SubCutaneous at bedtime  insulin lispro (ADMELOG) corrective regimen sliding scale   SubCutaneous every 6 hours

## 2023-07-12 NOTE — CONSULT NOTE ADULT - PROBLEM SELECTOR RECOMMENDATION 9
Type 2 A1c 7.0% adm fall  add lantus 6 units @ HS  c/w COURTNEY  c/w CC diet and accucheck ACHS  Recommend endocrine-Perlman onconsult  FU appt: TBA  DSC recommendations: return to home regimen and glucose monitoring  diabetes education provided as documented above  Diabetes support info and cell # [phone] given   Goal 100-180 mg/dL; 140-180 mg/dL in critical care areas
increase lantus 8 units qhs  change mod dose admelog corrective scale coverage qac/qhs  cont cons cho diet  goal bg 100-180 in hosp setting
offload; frequent rotation/change of position  julia gillette, RAFA, abd pad qod

## 2023-07-12 NOTE — CONSULT NOTE ADULT - SUBJECTIVE AND OBJECTIVE BOX
Chief Complaint: sacral press. ulcer    HPI: 71 yo WF, admitted on 7/1/23 following a fall and found to have a fx involving right femur/acetabulum/pubic ramus. Pt seen in the ICU. Asked to see pt regarding a sacral pressure ulcer.    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 1000 milliLiter(s) (100 mL/Hr) IV Continuous <Continuous>  dextrose 5%. 1000 milliLiter(s) (50 mL/Hr) IV Continuous <Continuous>  dextrose 50% Injectable 12.5 Gram(s) IV Push once  dextrose 50% Injectable 25 Gram(s) IV Push once  dextrose 50% Injectable 25 Gram(s) IV Push once  diltiazem    Tablet 60 milliGRAM(s) Oral three times a day  dronabinol 2.5 milliGRAM(s) Oral two times a day before meals  glucagon  Injectable 1 milliGRAM(s) IntraMuscular once  imipramine 50 milliGRAM(s) Oral daily  insulin glargine Injectable (LANTUS) 15 Unit(s) SubCutaneous at bedtime  insulin lispro (ADMELOG) corrective regimen sliding scale   SubCutaneous every 6 hours  lidocaine   4% Patch 1 Patch Transdermal daily  metoprolol tartrate 100 milliGRAM(s) Oral two times a day  midodrine. 10 milliGRAM(s) Oral three times a day  mirtazapine 7.5 milliGRAM(s) Oral at bedtime  pantoprazole    Tablet 40 milliGRAM(s) Oral before breakfast  polyethylene glycol 3350 17 Gram(s) Oral daily  senna 2 Tablet(s) Oral at bedtime  sodium zirconium cyclosilicate 5 Gram(s) Oral daily    MEDICATIONS  (PRN):  acetaminophen     Tablet .. 650 milliGRAM(s) Oral every 6 hours PRN Temp greater or equal to 38C (100.4F), Mild Pain (1 - 3)  dextrose Oral Gel 15 Gram(s) Oral once PRN Blood Glucose LESS THAN 70 milliGRAM(s)/deciliter      FAMILY HISTORY:  FH: myocardial infarction (Father)    FH: myocardial infarction (Father)    Family history of type 2 diabetes mellitus in brother (Sibling)            ROS:     Unobtainable    PHYSICAL EXAM-    Height (cm): 165.1 (07-11 @ 21:45)  Weight (kg): 50.1 (07-11 @ 21:45)  BMI (kg/m2): 18.4 (07-11 @ 21:45)  Vital Signs Last 24 Hrs  T(C): 37.3 (12 Jul 2023 11:47), Max: 37.4 (12 Jul 2023 04:44)  T(F): 99.1 (12 Jul 2023 11:47), Max: 99.3 (12 Jul 2023 04:44)  HR: 77 (12 Jul 2023 12:30) (77 - 99)  BP: 122/59 (12 Jul 2023 12:30) (76/57 - 187/87)  BP(mean): 82 (12 Jul 2023 12:30) (58 - 95)  RR: 27 (12 Jul 2023 12:30) (16 - 51)  SpO2: 98% (12 Jul 2023 12:20) (94% - 100%)    Parameters below as of 12 Jul 2023 08:00  Patient On (Oxygen Delivery Method): room air        Constitutional: well developed, well nourished, no apparent distress, alert, in wrist restraints     Bilateral shoulders, knees, buttocks, heels and right hip-skin intact; no ulcers  Left hip -unable to examine/visualize due to fx/pain; according to RN, no press. ulcer present.  Sacral- 4x3cm x 5mm ulcer with pale red base; muscle exposed; no cellulitis/edema/erythema                            11.4   20.17 )-----------( 544      ( 12 Jul 2023 05:42 )             35.1     07-12    133<L>  |  93<L>  |  111<H>  ----------------------------<  222<H>  5.3   |  26  |  7.90<H>    Ca    10.3<H>      12 Jul 2023 05:42  Mg     3.1     07-11    TPro  7.7  /  Alb  2.8<L>  /  TBili  0.5  /  DBili  x   /  AST  32  /  ALT  21  /  AlkPhos  128<H>  07-12      Radiology

## 2023-07-12 NOTE — PROGRESS NOTE ADULT - SUBJECTIVE AND OBJECTIVE BOX
CHIEF COMPLAINT/ REASON FOR VISIT  .. Patient was seen to address the  issue listed under PROBLEM LIST which is located toward bottom of this note     SHILO KOHLER    [facility] ICU1 01A    Allergies    No Known Drug Allergies  latex (Hives)    Intolerances        PAST MEDICAL & SURGICAL HISTORY:  HTN (hypertension)      h/o Anxiety attack      Depression      h/o Myocardial infarct 2007      h/o Hepatitis A 1969  currently resolved, no symptoms      Murmur, cardiac      h/o Smoking  quitted 3/2012      Anemia secondary to renal failure      ESRD on dialysis      Falls      Ataxia      Type 2 diabetes mellitus      Peripheral vascular disease, unspecified      CAD (coronary artery disease)      Diabetes      coronary stent 2007      s/p Ovarian cyst removal      s/p surgical removal of benign Skin lesion epigastric area      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          FAMILY HISTORY:  FH: myocardial infarction (Father)    FH: myocardial infarction (Father)    Family history of type 2 diabetes mellitus in brother (Sibling)        Home Medications:  acetaminophen 325 mg oral tablet: 2 tab(s) orally every 6 hours as needed (02 Jul 2023 15:24)  Admelog SoloStar 100 units/mL injectable solution: injectable 3 times a day (before meals)sliding scale  (02 Jul 2023 15:24)  apixaban 2.5 mg oral tablet: 1 tab(s) orally 2 times a day (02 Jul 2023 15:24)  aspirin 81 mg oral delayed release tablet: 1 tab(s) orally once a day (at bedtime) (02 Jul 2023 15:24)  atorvastatin 20 mg oral tablet: 1 tab(s) orally once a day (at bedtime) (02 Jul 2023 15:24)  bacitracin 500 units/g topical ointment: Apply topically to affected area once a day to right knee abrasion (02 Jul 2023 11:54)  Basaglar KwikPen 100 units/mL subcutaneous solution: 8 international unit(s) subcutaneous once a day (at bedtime) (02 Jul 2023 15:24)  cloNIDine 0.1 mg oral tablet: 1 tab(s) orally 2 times a day (02 Jul 2023 15:24)  DilTIAZem (Eqv-Cardizem CD) 180 mg/24 hours oral capsule, extended release: 1 cap(s) orally once a day (02 Jul 2023 15:24)  imipramine 50 mg oral tablet: 1 tab(s) orally once a day (in the evening) (02 Jul 2023 15:24)  metoprolol tartrate 100 mg oral tablet: 1 tab(s) orally 2 times a day (02 Jul 2023 15:24)  mirtazapine 7.5 mg oral tablet: 1 tab(s) orally once a day (at bedtime) (02 Jul 2023 15:24)  Nephro-Alfie oral tablet: 1 tab(s) orally once a day (02 Jul 2023 15:24)  PANTOPRAZOLE 40MG DR TAB: 1 tab(s) orally once a day (02 Jul 2023 15:24)  senna leaf extract oral tablet: 2 tab(s) orally once a day (at bedtime) (02 Jul 2023 15:24)      MEDICATIONS  (STANDING):  artificial  tears Solution 1 Drop(s) Both EYES every 12 hours  aspirin enteric coated 81 milliGRAM(s) Oral daily  ceftolozane/tazobactam IVPB 150 milliGRAM(s) IV Intermittent every 8 hours  chlorhexidine 2% Cloths 1 Application(s) Topical <User Schedule>  cloNIDine 0.1 milliGRAM(s) Oral two times a day  dextrose 5%. 1000 milliLiter(s) (50 mL/Hr) IV Continuous <Continuous>  dextrose 5%. 1000 milliLiter(s) (100 mL/Hr) IV Continuous <Continuous>  dextrose 50% Injectable 25 Gram(s) IV Push once  dextrose 50% Injectable 12.5 Gram(s) IV Push once  dextrose 50% Injectable 25 Gram(s) IV Push once  diltiazem    Tablet 60 milliGRAM(s) Oral three times a day  dronabinol 2.5 milliGRAM(s) Oral two times a day before meals  glucagon  Injectable 1 milliGRAM(s) IntraMuscular once  imipramine 50 milliGRAM(s) Oral daily  insulin glargine Injectable (LANTUS) 10 Unit(s) SubCutaneous at bedtime  insulin lispro (ADMELOG) corrective regimen sliding scale   SubCutaneous every 6 hours  lidocaine   4% Patch 1 Patch Transdermal daily  metoprolol tartrate 100 milliGRAM(s) Oral two times a day  mirtazapine 7.5 milliGRAM(s) Oral at bedtime  pantoprazole    Tablet 40 milliGRAM(s) Oral before breakfast  polyethylene glycol 3350 17 Gram(s) Oral daily  senna 2 Tablet(s) Oral at bedtime  sodium zirconium cyclosilicate 5 Gram(s) Oral daily    MEDICATIONS  (PRN):  acetaminophen     Tablet .. 650 milliGRAM(s) Oral every 6 hours PRN Temp greater or equal to 38C (100.4F), Mild Pain (1 - 3)  dextrose Oral Gel 15 Gram(s) Oral once PRN Blood Glucose LESS THAN 70 milliGRAM(s)/deciliter  midodrine 10 milliGRAM(s) Oral <User Schedule> PRN b/p      Diet, NPO:   Except Medications     Special Instructions for Nursing:  Except Medications (07-11-23 @ 21:32) [Active]          Vital Signs Last 24 Hrs  T(C): 37.4 (12 Jul 2023 04:44), Max: 37.4 (12 Jul 2023 04:44)  T(F): 99.3 (12 Jul 2023 04:44), Max: 99.3 (12 Jul 2023 04:44)  HR: 86 (12 Jul 2023 05:00) (80 - 99)  BP: 92/51 (12 Jul 2023 05:00) (76/57 - 187/87)  BP(mean): 69 (12 Jul 2023 05:00) (62 - 92)  RR: 20 (12 Jul 2023 05:00) (16 - 51)  SpO2: 98% (12 Jul 2023 05:00) (95% - 100%)    Parameters below as of 11 Jul 2023 22:00  Patient On (Oxygen Delivery Method): nasal cannula  O2 Flow (L/min): 3        07-11-23 @ 07:01  -  07-12-23 @ 05:10  --------------------------------------------------------  IN: 450 mL / OUT: 0 mL / NET: 450 mL              LABS:                        11.2   26.51 )-----------( 520      ( 11 Jul 2023 19:12 )             33.8     07-11    135  |  94<L>  |  96<H>  ----------------------------<  256<H>  4.0   |  22  |  6.60<H>    Ca    9.3      11 Jul 2023 17:09  Mg     3.1     07-11    TPro  7.3  /  Alb  2.5<L>  /  TBili  0.5  /  DBili  x   /  AST  37  /  ALT  22  /  AlkPhos  123<H>  07-11    PT/INR - ( 11 Jul 2023 17:09 )   PT: 14.1 sec;   INR: 1.20 ratio         PTT - ( 11 Jul 2023 17:09 )  PTT:21.3 sec  Urinalysis Basic - ( 11 Jul 2023 17:09 )    Color: x / Appearance: x / SG: x / pH: x  Gluc: 256 mg/dL / Ketone: x  / Bili: x / Urobili: x   Blood: x / Protein: x / Nitrite: x   Leuk Esterase: x / RBC: x / WBC x   Sq Epi: x / Non Sq Epi: x / Bacteria: x        ABG - ( 11 Jul 2023 19:48 )  pH, Arterial: 7.43  pH, Blood: x     /  pCO2: 34    /  pO2: 182   / HCO3: 23    / Base Excess: -1.7  /  SaO2: 100.0               WBC:  WBC Count: 26.51 K/uL (07-11 @ 19:12)  WBC Count: 19.09 K/uL (07-11 @ 08:33)  WBC Count: 19.81 K/uL (07-10 @ 09:00)  WBC Count: 18.01 K/uL (07-09 @ 07:07)      MICROBIOLOGY:  RECENT CULTURES:  07-10 .Blood Blood XXXX XXXX   No growth at 24 hours    07-10 .Blood Blood XXXX XXXX   No growth at 24 hours          CARDIAC MARKERS ( 11 Jul 2023 17:09 )  x     / x     / 439 U/L / x     / 12.7 ng/mL        PT/INR - ( 11 Jul 2023 17:09 )   PT: 14.1 sec;   INR: 1.20 ratio         PTT - ( 11 Jul 2023 17:09 )  PTT:21.3 sec    Sodium:  Sodium: 135 mmol/L (07-11 @ 17:09)  Sodium: 135 mmol/L (07-11 @ 08:33)  Sodium: 133 mmol/L (07-10 @ 09:00)  Sodium: 133 mmol/L (07-09 @ 07:07)      6.60 mg/dL 07-11 @ 17:09  9.30 mg/dL 07-11 @ 08:33  8.10 mg/dL 07-10 @ 09:00  6.40 mg/dL 07-09 @ 07:07      Hemoglobin:  Hemoglobin: 11.2 g/dL (07-11 @ 19:12)  Hemoglobin: 12.4 g/dL (07-11 @ 08:33)  Hemoglobin: 11.5 g/dL (07-10 @ 09:00)  Hemoglobin: 12.3 g/dL (07-09 @ 07:07)      Platelets: Platelet Count - Automated: 520 K/uL (07-11 @ 19:12)  Platelet Count - Automated: 490 K/uL (07-11 @ 08:33)  Platelet Count - Automated: 465 K/uL (07-10 @ 09:00)  Platelet Count - Automated: 461 K/uL (07-09 @ 07:07)      LIVER FUNCTIONS - ( 11 Jul 2023 17:09 )  Alb: 2.5 g/dL / Pro: 7.3 g/dL / ALK PHOS: 123 U/L / ALT: 22 U/L / AST: 37 U/L / GGT: x             Urinalysis Basic - ( 11 Jul 2023 17:09 )    Color: x / Appearance: x / SG: x / pH: x  Gluc: 256 mg/dL / Ketone: x  / Bili: x / Urobili: x   Blood: x / Protein: x / Nitrite: x   Leuk Esterase: x / RBC: x / WBC x   Sq Epi: x / Non Sq Epi: x / Bacteria: x        RADIOLOGY & ADDITIONAL STUDIES:      MICROBIOLOGY:  RECENT CULTURES:  07-10 .Blood Blood XXXX XXXX   No growth at 24 hours    07-10 .Blood Blood XXXX XXXX   No growth at 24 hours

## 2023-07-12 NOTE — PROGRESS NOTE ADULT - ATTENDING COMMENTS
72 year old female with history of HTN, DM2, ESRD on HD, paroxysmal atrial fibrillation here with right superior/inferior rami fracture and right comminuted acetabular fracture being managed medically. Course c/b acute encephalopathy and suspicion for a small left parietal intracerebral hemorrhage. Admitted to ICU for Q1H neurochecks.    NEURO: Acute metabolic encephalopathy due to sepsis / UTI. Possible bleed. MRI pending for further eval. Neuro follow up. Continue ASA.  CVS: Start midodrine 10 mg TID for hypotension. Rate control with metoprolol and diltiazem.  PULM: Small left pleural effusion on bedside US. Saturating well on 2 L NC.   GI: NPO except for meds.  RENAL: HD as per nephrology. Lokelma.  ENDO: ISS  ID: Continue Zerbaxa for suspected UTI. ID follow up.  HEME: Hold therapeutic AC for now.  PPX: SCDs  DNR/DNI  Prognosis guarded 72 year old female with history of HTN, DM2, ESRD on HD, paroxysmal atrial fibrillation presented here following unwitnessed fall. Found to have right superior/inferior rami fracture and right comminuted acetabular fracture being managed medically. Course c/b acute encephalopathy and suspicion for a small left parietal intracerebral hemorrhage. Admitted to ICU for Q1H neurochecks.    NEURO: Acute metabolic encephalopathy due to sepsis / UTI. Possible bleed. MRI pending for further eval. Neuro follow up. Continue ASA.  CVS: Start midodrine 10 mg TID for hypotension. Rate control with metoprolol and diltiazem.  PULM: Small left pleural effusion on bedside US. Saturating well on 2 L NC.   GI: NPO except for meds.  RENAL: HD as per nephrology. Lokelma.  ENDO: ISS  ID: Continue Zerbaxa for suspected UTI. ID follow up. Wound care consult for stage 4 sacral ulcer.  HEME: Hold therapeutic AC for now.  PPX: SCDs  DNR/DNI  Prognosis guarded

## 2023-07-12 NOTE — PROGRESS NOTE ADULT - SUBJECTIVE AND OBJECTIVE BOX
Patient is a 72y Female with a known history of :  Hip fracture [S72.009A]    Closed pelvic fracture [S32.9XXA]    Pubic ramus fracture [S32.599A]    Right acetabular fracture [S32.401A]    ESRD on dialysis [N18.6]    Prophylactic measure [Z29.9]    HTN (hypertension) [I10]    Fall [W19.XXXA]    Pleural effusion [J90]    Acute febrile illness [R50.9]    Type 2 diabetes mellitus [E11.9]      HPI:  72-year-old female with significant past medical history for hypertension, diabetes, dementia, end-stage renal disease on hemodialysis presents to the ED status post unwitnessed fall from [facility].  Patient states that she heard some voices then rolled out of bed.  Patient complaining of right hip pain.  Unknown head trauma.  + Eliquis < from: Xray Femur showed  Fractures including the medial wall of the acetabulum and inferior right pubic ramus Admitted for pain management ,PT/OT        (01 Jul 2023 15:24)      REVIEW OF SYSTEMS:    CONSTITUTIONAL: No fever, weight loss, or fatigue  EYES: No eye pain, visual disturbances, or discharge  ENMT:  No difficulty hearing, tinnitus, vertigo; No sinus or throat pain  NECK: No pain or stiffness  BREASTS: No pain, masses, or nipple discharge  RESPIRATORY: No cough, wheezing, chills or hemoptysis; No shortness of breath  CARDIOVASCULAR: No chest pain, palpitations, dizziness, or leg swelling  GASTROINTESTINAL: No abdominal or epigastric pain. No nausea, vomiting, or hematemesis; No diarrhea or constipation. No melena or hematochezia.  GENITOURINARY: No dysuria, frequency, hematuria, or incontinence  NEUROLOGICAL: No headaches, memory loss, loss of strength, numbness, or tremors  SKIN: No itching, burning, rashes, or lesions   LYMPH NODES: No enlarged glands  ENDOCRINE: No heat or cold intolerance; No hair loss  MUSCULOSKELETAL: No joint pain or swelling; No muscle, back, or extremity pain  PSYCHIATRIC: No depression, anxiety, mood swings, or difficulty sleeping  HEME/LYMPH: No easy bruising, or bleeding gums  ALLERGY AND IMMUNOLOGIC: No hives or eczema    MEDICATIONS  (STANDING):  artificial  tears Solution 1 Drop(s) Both EYES every 12 hours  ceftolozane/tazobactam IVPB 150 milliGRAM(s) IV Intermittent every 8 hours  chlorhexidine 2% Cloths 1 Application(s) Topical <User Schedule>  cloNIDine 0.1 milliGRAM(s) Oral two times a day  dextrose 5%. 1000 milliLiter(s) (50 mL/Hr) IV Continuous <Continuous>  dextrose 5%. 1000 milliLiter(s) (100 mL/Hr) IV Continuous <Continuous>  dextrose 50% Injectable 12.5 Gram(s) IV Push once  dextrose 50% Injectable 25 Gram(s) IV Push once  dextrose 50% Injectable 25 Gram(s) IV Push once  diltiazem    Tablet 60 milliGRAM(s) Oral three times a day  dronabinol 2.5 milliGRAM(s) Oral two times a day before meals  glucagon  Injectable 1 milliGRAM(s) IntraMuscular once  imipramine 50 milliGRAM(s) Oral daily  insulin glargine Injectable (LANTUS) 15 Unit(s) SubCutaneous at bedtime  insulin lispro (ADMELOG) corrective regimen sliding scale   SubCutaneous every 6 hours  lidocaine   4% Patch 1 Patch Transdermal daily  metoprolol tartrate 100 milliGRAM(s) Oral two times a day  mirtazapine 7.5 milliGRAM(s) Oral at bedtime  pantoprazole    Tablet 40 milliGRAM(s) Oral before breakfast  polyethylene glycol 3350 17 Gram(s) Oral daily  senna 2 Tablet(s) Oral at bedtime  sodium zirconium cyclosilicate 5 Gram(s) Oral daily    MEDICATIONS  (PRN):  acetaminophen     Tablet .. 650 milliGRAM(s) Oral every 6 hours PRN Temp greater or equal to 38C (100.4F), Mild Pain (1 - 3)  dextrose Oral Gel 15 Gram(s) Oral once PRN Blood Glucose LESS THAN 70 milliGRAM(s)/deciliter  midodrine 10 milliGRAM(s) Oral <User Schedule> PRN b/p      ALLERGIES: No Known Drug Allergies  latex (Hives)      FAMILY HISTORY:  FH: myocardial infarction (Father)    FH: myocardial infarction (Father)    Family history of type 2 diabetes mellitus in brother (Sibling)        PHYSICAL EXAMINATION:  -----------------------------  T(C): 37.2 (07-12-23 @ 08:11), Max: 37.4 (07-12-23 @ 04:44)  HR: 79 (07-12-23 @ 09:00) (79 - 99)  BP: 90/43 (07-12-23 @ 09:00) (76/57 - 187/87)  RR: 23 (07-12-23 @ 09:00) (16 - 51)  SpO2: 100% (07-12-23 @ 09:00) (94% - 100%)  Wt(kg): --    07-11 @ 07:01  -  07-12 @ 07:00  --------------------------------------------------------  IN:    IV PiggyBack: 250 mL    IV PiggyBack: 100 mL    IV PiggyBack: 100 mL  Total IN: 450 mL    OUT:  Total OUT: 0 mL    Total NET: 450 mL        Height (cm): 165.1 (07-11 @ 21:45)  Weight (kg): 50.1 (07-11 @ 21:45)  BMI (kg/m2): 18.4 (07-11 @ 21:45)  BSA (m2): 1.54 (07-11 @ 21:45)    VITALS  T(C): 37.2 (07-12-23 @ 08:11), Max: 37.4 (07-12-23 @ 04:44)  HR: 79 (07-12-23 @ 09:00) (79 - 99)  BP: 90/43 (07-12-23 @ 09:00) (76/57 - 187/87)  RR: 23 (07-12-23 @ 09:00) (16 - 51)  SpO2: 100% (07-12-23 @ 09:00) (94% - 100%)    Constitutional: well developed, normal appearance, well groomed, well nourished, no deformities and no acute distress.   Eyes: the conjunctiva exhibited no abnormalities and the eyelids demonstrated no xanthelasmas.   HEENT: normal oral mucosa, no oral pallor and no oral cyanosis.   Neck: normal jugular venous A waves present, normal jugular venous V waves present and no jugular venous wilson A waves.   Pulmonary: no respiratory distress, normal respiratory rhythm and effort, no accessory muscle use and lungs were clear to auscultation bilaterally.   Cardiovascular: heart rate and rhythm were normal, normal S1 and S2 and no murmur, gallop, rub, heave or thrill are present.   Abdomen: soft, non-tender, no hepato-splenomegaly and no abdominal mass palpated.   Musculoskeletal: the gait could not be assessed..   Extremities: no clubbing of the fingernails, no localized cyanosis, no petechial hemorrhages and no ischemic changes.   Skin: normal skin color and pigmentation, no rash, no venous stasis, no skin lesions, no skin ulcer and no xanthoma was observed.   Psychiatric: oriented to person, place, and time, the affect was normal, the mood was normal and not feeling anxious.     LABS:   --------  07-12    133<L>  |  93<L>  |  111<H>  ----------------------------<  222<H>  5.3   |  26  |  7.90<H>    Ca    10.3<H>      12 Jul 2023 05:42  Mg     3.1     07-11    TPro  7.7  /  Alb  2.8<L>  /  TBili  0.5  /  DBili  x   /  AST  32  /  ALT  21  /  AlkPhos  128<H>  07-12                         11.4   20.17 )-----------( 544      ( 12 Jul 2023 05:42 )             35.1     PT/INR - ( 12 Jul 2023 05:42 )   PT: 14.6 sec;   INR: 1.25 ratio         PTT - ( 11 Jul 2023 17:09 )  PTT:21.3 sec        Culture Results:   No growth at 24 hours (07-10 @ 16:25)  Culture Results:   No growth at 24 hours (07-10 @ 16:20)      RADIOLOGY:  -----------------    ECG:     ECHO:

## 2023-07-12 NOTE — PROGRESS NOTE ADULT - SUBJECTIVE AND OBJECTIVE BOX
Patient is a 72y Female whom presented to the hospital with esrd on hd     PAST MEDICAL & SURGICAL HISTORY:  HTN (hypertension)      h/o Anxiety attack      Depression      h/o Myocardial infarct 2007      h/o Hepatitis A 1969  currently resolved, no symptoms      Murmur, cardiac      h/o Smoking  quitted 3/2012      Anemia secondary to renal failure      ESRD on dialysis      Falls      Ataxia      Type 2 diabetes mellitus      Peripheral vascular disease, unspecified      CAD (coronary artery disease)      Diabetes      coronary stent 2007      s/p Ovarian cyst removal      s/p surgical removal of benign Skin lesion epigastric area      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 650 milliGRAM(s) Oral every 6 hours  aspirin enteric coated 81 milliGRAM(s) Oral daily  cloNIDine 0.1 milliGRAM(s) Oral two times a day  dextrose 5%. 1000 milliLiter(s) (50 mL/Hr) IV Continuous <Continuous>  dextrose 5%. 1000 milliLiter(s) (100 mL/Hr) IV Continuous <Continuous>  dextrose 50% Injectable 25 Gram(s) IV Push once  dextrose 50% Injectable 12.5 Gram(s) IV Push once  dextrose 50% Injectable 25 Gram(s) IV Push once  diltiazem    Tablet 60 milliGRAM(s) Oral three times a day  dronabinol 2.5 milliGRAM(s) Oral two times a day before meals  glucagon  Injectable 1 milliGRAM(s) IntraMuscular once  imipramine 50 milliGRAM(s) Oral daily  insulin lispro (ADMELOG) corrective regimen sliding scale   SubCutaneous three times a day before meals  metoprolol tartrate 100 milliGRAM(s) Oral two times a day  mirtazapine 7.5 milliGRAM(s) Oral at bedtime  multivitamin 1 Tablet(s) Oral daily  pantoprazole    Tablet 40 milliGRAM(s) Oral before breakfast  senna 2 Tablet(s) Oral at bedtime  sodium chloride 0.45%. 1000 milliLiter(s) (50 mL/Hr) IV Continuous <Continuous>      Allergies    No Known Drug Allergies  latex (Hives)    Intolerances                                  SOCIAL HISTORY:  Denies ETOh,Smoking,     FAMILY HISTORY:  FH: myocardial infarction (Father)    FH: myocardial infarction (Father)    Family history of type 2 diabetes mellitus in brother (Sibling)        REVIEW OF SYSTEMS:    unable to obtained a good review system                          11.4   20.17 )-----------( 544      ( 12 Jul 2023 05:42 )             35.1       CBC Full  -  ( 12 Jul 2023 05:42 )  WBC Count : 20.17 K/uL  RBC Count : 3.66 M/uL  Hemoglobin : 11.4 g/dL  Hematocrit : 35.1 %  Platelet Count - Automated : 544 K/uL  Mean Cell Volume : 95.9 fl  Mean Cell Hemoglobin : 31.1 pg  Mean Cell Hemoglobin Concentration : 32.5 gm/dL  Auto Neutrophil # : x  Auto Lymphocyte # : x  Auto Monocyte # : x  Auto Eosinophil # : x  Auto Basophil # : x  Auto Neutrophil % : x  Auto Lymphocyte % : x  Auto Monocyte % : x  Auto Eosinophil % : x  Auto Basophil % : x      07-12    133<L>  |  93<L>  |  111<H>  ----------------------------<  222<H>  5.3   |  26  |  7.90<H>    Ca    10.3<H>      12 Jul 2023 05:42  Mg     3.1     07-11    TPro  7.7  /  Alb  2.8<L>  /  TBili  0.5  /  DBili  x   /  AST  32  /  ALT  21  /  AlkPhos  128<H>  07-12      CAPILLARY BLOOD GLUCOSE      POCT Blood Glucose.: 241 mg/dL (12 Jul 2023 12:06)  POCT Blood Glucose.: 195 mg/dL (12 Jul 2023 05:30)  POCT Blood Glucose.: 326 mg/dL (11 Jul 2023 23:37)  POCT Blood Glucose.: 304 mg/dL (11 Jul 2023 22:43)  POCT Blood Glucose.: 254 mg/dL (11 Jul 2023 19:10)  POCT Blood Glucose.: 229 mg/dL (11 Jul 2023 17:02)      Vital Signs Last 24 Hrs  T(C): 36.7 (12 Jul 2023 16:30), Max: 37.4 (12 Jul 2023 04:44)  T(F): 98.1 (12 Jul 2023 16:30), Max: 99.3 (12 Jul 2023 04:44)  HR: 80 (12 Jul 2023 16:00) (77 - 99)  BP: 110/52 (12 Jul 2023 16:00) (76/57 - 187/87)  BP(mean): 70 (12 Jul 2023 16:00) (58 - 95)  RR: 17 (12 Jul 2023 16:00) (16 - 51)  SpO2: 98% (12 Jul 2023 16:00) (94% - 100%)    Parameters below as of 12 Jul 2023 08:00  Patient On (Oxygen Delivery Method): room air        Urinalysis Basic - ( 12 Jul 2023 05:42 )    Color: x / Appearance: x / SG: x / pH: x  Gluc: 222 mg/dL / Ketone: x  / Bili: x / Urobili: x   Blood: x / Protein: x / Nitrite: x   Leuk Esterase: x / RBC: x / WBC x   Sq Epi: x / Non Sq Epi: x / Bacteria: x        PT/INR - ( 12 Jul 2023 05:42 )   PT: 14.6 sec;   INR: 1.25 ratio         PTT - ( 11 Jul 2023 17:09 )  PTT:21.3 sec                    PHYSICAL EXAM:    Constitutional: NAD  HEENT: conjunctive   clear   Neck:  No JVD  Respiratory: CTAB  Cardiovascular: S1 and S2  Gastrointestinal: BS+, soft, NT/ND  Extremities: No peripheral edema  Neurological:  no focal deficits  pos fistula

## 2023-07-12 NOTE — PROGRESS NOTE ADULT - SUBJECTIVE AND OBJECTIVE BOX
[facility]  INFECTIOUS DISEASES - [address], [place] [address]  Tel: [phone]     Fax: [phone]  =======================================================    SHILO KOHLER [id number]    Follow up: No fevers. Transferred to ICU overnight. Patient arousable, but unable to provide more history.     Allergies:  No Known Drug Allergies  latex (Hives)      Antibiotics:  acetaminophen     Tablet .. 650 milliGRAM(s) Oral every 6 hours PRN  artificial  tears Solution 1 Drop(s) Both EYES every 12 hours  aspirin enteric coated 81 milliGRAM(s) Oral daily  ceftolozane/tazobactam IVPB 150 milliGRAM(s) IV Intermittent every 8 hours  ceftolozane/tazobactam IVPB 150 milliGRAM(s) IV Intermittent every 8 hours  chlorhexidine 2% Cloths 1 Application(s) Topical <User Schedule>  dextrose 5%. 1000 milliLiter(s) IV Continuous <Continuous>  dextrose 5%. 1000 milliLiter(s) IV Continuous <Continuous>  dextrose 50% Injectable 12.5 Gram(s) IV Push once  dextrose 50% Injectable 25 Gram(s) IV Push once  dextrose 50% Injectable 25 Gram(s) IV Push once  dextrose Oral Gel 15 Gram(s) Oral once PRN  diltiazem    Tablet 60 milliGRAM(s) Oral three times a day  dronabinol 2.5 milliGRAM(s) Oral two times a day before meals  glucagon  Injectable 1 milliGRAM(s) IntraMuscular once  imipramine 50 milliGRAM(s) Oral daily  insulin glargine Injectable (LANTUS) 15 Unit(s) SubCutaneous at bedtime  insulin lispro (ADMELOG) corrective regimen sliding scale   SubCutaneous every 6 hours  lidocaine   4% Patch 1 Patch Transdermal daily  metoprolol tartrate 100 milliGRAM(s) Oral two times a day  midodrine. 10 milliGRAM(s) Oral three times a day  mirtazapine 7.5 milliGRAM(s) Oral at bedtime  mupirocin 2% Nasal 1 Application(s) Both Nostrils two times a day  pantoprazole    Tablet 40 milliGRAM(s) Oral before breakfast  polyethylene glycol 3350 17 Gram(s) Oral daily  senna 2 Tablet(s) Oral at bedtime  sodium zirconium cyclosilicate 5 Gram(s) Oral daily       REVIEW OF SYSTEMS:  unable to obtain 2/2 dementia     Physical Exam:  ICU Vital Signs Last 24 Hrs  T(C): 37.3 (12 Jul 2023 11:47), Max: 37.4 (12 Jul 2023 04:44)  T(F): 99.1 (12 Jul 2023 11:47), Max: 99.3 (12 Jul 2023 04:44)  HR: 80 (12 Jul 2023 13:00) (77 - 99)  BP: 112/56 (12 Jul 2023 13:00) (76/57 - 187/87)  BP(mean): 80 (12 Jul 2023 13:00) (58 - 95)  ABP: --  ABP(mean): --  RR: 42 (12 Jul 2023 13:00) (16 - 51)  SpO2: 98% (12 Jul 2023 12:20) (94% - 100%)    O2 Parameters below as of 12 Jul 2023 08:00  Patient On (Oxygen Delivery Method): room air        GEN: NAD  HEENT: normocephalic and atraumatic.  NECK: Supple.  No lymphadenopathy   LUNGS: Normal respiratory effort  HEART: Regular rate and rhythm   ABDOMEN: Soft, nontender, and nondistended.    EXTREMITIES: No leg edema.   SKIN: (+) ulcer on coccyx    Labs:  07-12    133<L>  |  93<L>  |  111<H>  ----------------------------<  222<H>  5.3   |  26  |  7.90<H>    Ca    10.3<H>      12 Jul 2023 05:42  Mg     3.1     07-11    TPro  7.7  /  Alb  2.8<L>  /  TBili  0.5  /  DBili  x   /  AST  32  /  ALT  21  /  AlkPhos  128<H>  07-12                          11.4   20.17 )-----------( 544      ( 12 Jul 2023 05:42 )             35.1     PT/INR - ( 12 Jul 2023 05:42 )   PT: 14.6 sec;   INR: 1.25 ratio         PTT - ( 11 Jul 2023 17:09 )  PTT:21.3 sec  Urinalysis Basic - ( 12 Jul 2023 05:42 )    Color: x / Appearance: x / SG: x / pH: x  Gluc: 222 mg/dL / Ketone: x  / Bili: x / Urobili: x   Blood: x / Protein: x / Nitrite: x   Leuk Esterase: x / RBC: x / WBC x   Sq Epi: x / Non Sq Epi: x / Bacteria: x      LIVER FUNCTIONS - ( 12 Jul 2023 05:42 )  Alb: 2.8 g/dL / Pro: 7.7 g/dL / ALK PHOS: 128 U/L / ALT: 21 U/L / AST: 32 U/L / GGT: x             RECENT CULTURES:  07-10 @ 16:25 .Blood Blood     No growth at 24 hours        07-10 @ 16:20 .Blood Blood     No growth at 24 hours        07-03 @ 21:20 Clean Catch Clean Catch (Midstream) Pseudomonas aeruginosa (Carbapenem Resistant)  Klebsiella pneumoniae ESBL    >100,000 CFU/ml Pseudomonas aeruginosa (Carbapenem Resistant)  50,000 - 99,000 CFU/mL Klebsiella pneumoniae ESBL        07-03 @ 16:10 .Blood Blood     No growth at 5 days        07-03 @ 16:05 .Blood Blood     No growth at 5 days              All imaging and data are reviewed.

## 2023-07-12 NOTE — PROGRESS NOTE ADULT - SUBJECTIVE AND OBJECTIVE BOX
INTERVAL HPI/OVERNIGHT EVENTS: Pt hypotensive overnight with a low BP of 86/42 and tachypneic with an RR max of 51.    SUBJECTIVE: Seen and examined pt at bedside. Has no acute complaints at this time.    Review of Systems:  Constitutional: No fever, fatigue  Neuro: No headache  Resp: No cough, shortness of breath  CVS: No chest pain  GI: No nausea, vomiting, diarrhea        ICU Vital Signs Last 24 Hrs  T(C): 37.2 (12 Jul 2023 08:11), Max: 37.4 (12 Jul 2023 04:44)  T(F): 99 (12 Jul 2023 08:11), Max: 99.3 (12 Jul 2023 04:44)  HR: 79 (12 Jul 2023 10:00) (79 - 99)  BP: 85/44 (12 Jul 2023 10:00) (76/57 - 187/87)  BP(mean): 60 (12 Jul 2023 10:00) (60 - 95)  ABP: --  ABP(mean): --  RR: 23 (12 Jul 2023 10:00) (16 - 51)  SpO2: 99% (12 Jul 2023 10:00) (94% - 100%)    O2 Parameters below as of 12 Jul 2023 08:00  Patient On (Oxygen Delivery Method): room air              07-11-23 @ 07:01  -  07-12-23 @ 07:00  --------------------------------------------------------  IN: 450 mL / OUT: 0 mL / NET: 450 mL        CAPILLARY BLOOD GLUCOSE      POCT Blood Glucose.: 195 mg/dL (12 Jul 2023 05:30)      I&O's Summary    11 Jul 2023 07:01  -  12 Jul 2023 07:00  --------------------------------------------------------  IN: 450 mL / OUT: 0 mL / NET: 450 mL        PHYSICAL EXAM:  Gen: NAD  Neuro: A&Ox2  HEENT: NCAT  Resp: CTA BL, no WRR  Cardio: +S1, S2, no MRG  Abd: soft and nontender to palpation  Ext: no lower extremity swelling  Skin: WWP    Meds:  ceftolozane/tazobactam IVPB IV Intermittent    cloNIDine Oral  diltiazem    Tablet Oral  metoprolol tartrate Oral  midodrine Oral PRN    dextrose 50% Injectable IV Push  dextrose 50% Injectable IV Push  dextrose 50% Injectable IV Push  dextrose Oral Gel Oral PRN  glucagon  Injectable IntraMuscular  insulin glargine Injectable (LANTUS) SubCutaneous  insulin lispro (ADMELOG) corrective regimen sliding scale SubCutaneous      acetaminophen     Tablet .. Oral PRN  dronabinol Oral  imipramine Oral  mirtazapine Oral      aspirin enteric coated Oral    pantoprazole    Tablet Oral  polyethylene glycol 3350 Oral  senna Oral      dextrose 5%. IV Continuous  dextrose 5%. IV Continuous      artificial  tears Solution Both EYES  chlorhexidine 2% Cloths Topical  lidocaine   4% Patch Transdermal    sodium zirconium cyclosilicate Oral                            11.4   20.17 )-----------( 544      ( 12 Jul 2023 05:42 )             35.1     Bands 1.0    07-12    133<L>  |  93<L>  |  111<H>  ----------------------------<  222<H>  5.3   |  26  |  7.90<H>    Ca    10.3<H>      12 Jul 2023 05:42  Mg     3.1     07-11    TPro  7.7  /  Alb  2.8<L>  /  TBili  0.5  /  DBili  x   /  AST  32  /  ALT  21  /  AlkPhos  128<H>  07-12    Lactate 0.7           07-11 @ 19:47      CARDIAC MARKERS ( 11 Jul 2023 17:09 )  x     / x     / 439 U/L / x     / 12.7 ng/mL      PT/INR - ( 12 Jul 2023 05:42 )   PT: 14.6 sec;   INR: 1.25 ratio         PTT - ( 11 Jul 2023 17:09 )  PTT:21.3 sec  Urinalysis Basic - ( 12 Jul 2023 05:42 )    Color: x / Appearance: x / SG: x / pH: x  Gluc: 222 mg/dL / Ketone: x  / Bili: x / Urobili: x   Blood: x / Protein: x / Nitrite: x   Leuk Esterase: x / RBC: x / WBC x   Sq Epi: x / Non Sq Epi: x / Bacteria: x      .Blood Blood   No growth at 24 hours -- 07-10 @ 16:25  .Blood Blood   No growth at 24 hours -- 07-10 @ 16:20    Radiology:  CT Head:  IMPRESSION:  Persistent IV contrast opacification of the cerebral vasculature from   prior CTA head/neck.    A 5 mm curvilinear hyperattenuating focus in the left parietal lobe may   be slightly increased from 3 mm on 07/11/2023 at 5:21 PM.  This may   reflect a small focus of hemorrhage versus contrast staining in an acute   infarct.  MRI may be obtained for further evaluation.  I discussed the   findings with provider IVORY Ramos on 07/11/2023 at 11:05 PM.    CT Angio Head:  IMPRESSION:    1.   Right carotid system:    Atherosclerotic plaque without    hemodynamically significant stenosis.    2.   Left carotid system:     Atherosclerotic plaque without    hemodynamically significant stenosis.    3.   Vertebral circulation:    Atherosclerotic plaque ostium left   vertebral artery without hemodynamically significant stenosis    4.  Anterior intracranial circulation:     Atherosclerotic plaque   cavernous and clinoid segments of the internal carotid arteries,   mild-to-moderate on the right and moderate on the left.    5.  Posterior intracranial circulation:    Intracranial atherosclerosis   right vertebral artery, mild tomoderate    6.  No large vessel occlusion    7.  Brain perfusion:   No acute infarction of the brain is convincingly   demonstrated.    8. No anomalous vascularity found at the location of suspected   parenchymal hemorrhage on noncontrast CT head stroke recently performed.      CT Brain:  IMPRESSION:    Small focus increased density has developed in the left parietal lobe   since 7/1/2023 likely interval brain parenchymal hemorrhage. Note that a   contrast-enhanced CT was performed earlier today and residual enhancement   within a vascular structure could mimic this imaging appearance. Further   evaluation by MR brain recommended.    CT Hip with IV Contrast  IMPRESSION:    1.  Subacute mildly to moderately displaced/impacted dual column right   acetabular fracture resulting in approximately 2.1 x 3.4 cm region of   significant discontinuity/cortical step-off along the posterior half of   the weightbearing articular surface.  2.  No sizable hematoma or drainable encapsulated fluid collection.      Tubes/Lines: Right Forearm AVF      GLOBAL ISSUE/BEST PRACTICE:  Analgesia: Y  Sedation: N  HOB elevation: Y  Stress ulcer prophylaxis: Y  VTE prophylaxis: Y, SCDs  Glycemic control: Y  Nutrition: Y, NPO    CODE STATUS: DNR/DNI

## 2023-07-12 NOTE — PROGRESS NOTE ADULT - ASSESSMENT
73YO F PMH hypertension, diabetes, dementia, end-stage renal disease on hemodialysis, who presented status post unwitnessed fall from [employer]- rolled out of bed. Patient complained of right hip pain--found to have fractures including the medial wall of the acetabulum and inferior right pubic ramus. Patient found to have leukocytosis, initially thought to be reactive 2/2 hip fracture.    7/4: started on empiric cefepime on 7/4 given new fever  7/7: CT chest and A/P done, showed L pleural effusion but no signs of infection  7/8: Broadened to meropenem given ESBL klebsiella in urine  7/11: RRT called for unresponsiveness, CT head showed potential hemorrhage; given worsening leukocytosis to 26 of unclear etiology, started on Zerbaxa to cover CRE pseudomonas in urine  7/12: Now in ICU, with episodes of hypotension; no fever and leukocytosis improving, repeat blood cultures currently no growth    #Leukocytosis  #Positive urine culture    -continue ceftolozane-tazobactam (renally dosed)  -can hold off on vancomycin  -follow cultures to completion  -aspiration precautions  -wound care  -discussed with Dr. Kimberli Tsai MD  Division of infectious Diseases  Cell [phone] between 8am and 6pm  After 6pm and over the weekends please call ID service line at [phone].

## 2023-07-12 NOTE — CONSULT NOTE ADULT - PROVIDER SPECIALTY LIST ADULT
Critical Care
Infectious Disease
Endocrinology
Cardiology
Pulmonology
Wound Care
Nephrology
Orthopedics
Diabetes

## 2023-07-13 LAB
ALBUMIN SERPL ELPH-MCNC: 2.3 G/DL — LOW (ref 3.3–5)
ALP SERPL-CCNC: 121 U/L — HIGH (ref 40–120)
ALT FLD-CCNC: 18 U/L — SIGNIFICANT CHANGE UP (ref 12–78)
ANION GAP SERPL CALC-SCNC: 16 MMOL/L — SIGNIFICANT CHANGE UP (ref 5–17)
AST SERPL-CCNC: 30 U/L — SIGNIFICANT CHANGE UP (ref 15–37)
BASOPHILS # BLD AUTO: 0.16 K/UL — SIGNIFICANT CHANGE UP (ref 0–0.2)
BASOPHILS NFR BLD AUTO: 0.9 % — SIGNIFICANT CHANGE UP (ref 0–2)
BILIRUB SERPL-MCNC: 0.5 MG/DL — SIGNIFICANT CHANGE UP (ref 0.2–1.2)
BUN SERPL-MCNC: 125 MG/DL — HIGH (ref 7–23)
CALCIUM SERPL-MCNC: 9.8 MG/DL — SIGNIFICANT CHANGE UP (ref 8.5–10.1)
CHLORIDE SERPL-SCNC: 95 MMOL/L — LOW (ref 96–108)
CO2 SERPL-SCNC: 19 MMOL/L — LOW (ref 22–31)
CREAT SERPL-MCNC: 8.6 MG/DL — HIGH (ref 0.5–1.3)
EGFR: 4 ML/MIN/1.73M2 — LOW
EOSINOPHIL # BLD AUTO: 0.36 K/UL — SIGNIFICANT CHANGE UP (ref 0–0.5)
EOSINOPHIL NFR BLD AUTO: 1.9 % — SIGNIFICANT CHANGE UP (ref 0–6)
GLUCOSE SERPL-MCNC: 94 MG/DL — SIGNIFICANT CHANGE UP (ref 70–99)
HCT VFR BLD CALC: 37 % — SIGNIFICANT CHANGE UP (ref 34.5–45)
HGB BLD-MCNC: 11.9 G/DL — SIGNIFICANT CHANGE UP (ref 11.5–15.5)
IMM GRANULOCYTES NFR BLD AUTO: 4.6 % — HIGH (ref 0–0.9)
LYMPHOCYTES # BLD AUTO: 1.33 K/UL — SIGNIFICANT CHANGE UP (ref 1–3.3)
LYMPHOCYTES # BLD AUTO: 7.1 % — LOW (ref 13–44)
MAGNESIUM SERPL-MCNC: 2.6 MG/DL — SIGNIFICANT CHANGE UP (ref 1.6–2.6)
MCHC RBC-ENTMCNC: 31.3 PG — SIGNIFICANT CHANGE UP (ref 27–34)
MCHC RBC-ENTMCNC: 32.2 GM/DL — SIGNIFICANT CHANGE UP (ref 32–36)
MCV RBC AUTO: 97.4 FL — SIGNIFICANT CHANGE UP (ref 80–100)
MONOCYTES # BLD AUTO: 2.26 K/UL — HIGH (ref 0–0.9)
MONOCYTES NFR BLD AUTO: 12.1 % — SIGNIFICANT CHANGE UP (ref 2–14)
NEUTROPHILS # BLD AUTO: 13.71 K/UL — HIGH (ref 1.8–7.4)
NEUTROPHILS NFR BLD AUTO: 73.4 % — SIGNIFICANT CHANGE UP (ref 43–77)
NRBC # BLD: 0 /100 WBCS — SIGNIFICANT CHANGE UP (ref 0–0)
PHOSPHATE SERPL-MCNC: 8.2 MG/DL — HIGH (ref 2.5–4.5)
PLATELET # BLD AUTO: 451 K/UL — HIGH (ref 150–400)
POTASSIUM SERPL-MCNC: 5.2 MMOL/L — SIGNIFICANT CHANGE UP (ref 3.5–5.3)
POTASSIUM SERPL-SCNC: 5.2 MMOL/L — SIGNIFICANT CHANGE UP (ref 3.5–5.3)
PROT SERPL-MCNC: 7.1 G/DL — SIGNIFICANT CHANGE UP (ref 6–8.3)
RBC # BLD: 3.8 M/UL — SIGNIFICANT CHANGE UP (ref 3.8–5.2)
RBC # FLD: 16.2 % — HIGH (ref 10.3–14.5)
SODIUM SERPL-SCNC: 130 MMOL/L — LOW (ref 135–145)
WBC # BLD: 18.67 K/UL — HIGH (ref 3.8–10.5)
WBC # FLD AUTO: 18.67 K/UL — HIGH (ref 3.8–10.5)

## 2023-07-13 PROCEDURE — 99233 SBSQ HOSP IP/OBS HIGH 50: CPT | Mod: GC

## 2023-07-13 PROCEDURE — 99222 1ST HOSP IP/OBS MODERATE 55: CPT

## 2023-07-13 PROCEDURE — 99233 SBSQ HOSP IP/OBS HIGH 50: CPT

## 2023-07-13 PROCEDURE — 99232 SBSQ HOSP IP/OBS MODERATE 35: CPT

## 2023-07-13 RX ORDER — ALBUMIN HUMAN 25 %
50 VIAL (ML) INTRAVENOUS ONCE
Refills: 0 | Status: COMPLETED | OUTPATIENT
Start: 2023-07-13 | End: 2023-07-13

## 2023-07-13 RX ORDER — INSULIN LISPRO 100/ML
VIAL (ML) SUBCUTANEOUS
Refills: 0 | Status: DISCONTINUED | OUTPATIENT
Start: 2023-07-13 | End: 2023-07-17

## 2023-07-13 RX ORDER — INSULIN GLARGINE 100 [IU]/ML
13 INJECTION, SOLUTION SUBCUTANEOUS AT BEDTIME
Refills: 0 | Status: DISCONTINUED | OUTPATIENT
Start: 2023-07-13 | End: 2023-07-17

## 2023-07-13 RX ORDER — INSULIN LISPRO 100/ML
VIAL (ML) SUBCUTANEOUS AT BEDTIME
Refills: 0 | Status: DISCONTINUED | OUTPATIENT
Start: 2023-07-13 | End: 2023-07-17

## 2023-07-13 RX ORDER — METOPROLOL TARTRATE 50 MG
100 TABLET ORAL
Refills: 0 | Status: DISCONTINUED | OUTPATIENT
Start: 2023-07-13 | End: 2023-07-17

## 2023-07-13 RX ORDER — ALBUMIN HUMAN 25 %
50 VIAL (ML) INTRAVENOUS
Refills: 0 | Status: DISCONTINUED | OUTPATIENT
Start: 2023-07-13 | End: 2023-07-17

## 2023-07-13 RX ADMIN — MIDODRINE HYDROCHLORIDE 10 MILLIGRAM(S): 2.5 TABLET ORAL at 17:45

## 2023-07-13 RX ADMIN — Medication 60 MILLIGRAM(S): at 21:56

## 2023-07-13 RX ADMIN — Medication 50 MILLIGRAM(S): at 11:35

## 2023-07-13 RX ADMIN — MUPIROCIN 1 APPLICATION(S): 20 OINTMENT TOPICAL at 18:06

## 2023-07-13 RX ADMIN — Medication 50 MILLILITER(S): at 11:21

## 2023-07-13 RX ADMIN — Medication 60 MILLIGRAM(S): at 15:09

## 2023-07-13 RX ADMIN — Medication 2.5 MILLIGRAM(S): at 17:45

## 2023-07-13 RX ADMIN — SENNA PLUS 2 TABLET(S): 8.6 TABLET ORAL at 21:56

## 2023-07-13 RX ADMIN — MIRTAZAPINE 7.5 MILLIGRAM(S): 45 TABLET, ORALLY DISINTEGRATING ORAL at 21:56

## 2023-07-13 RX ADMIN — CEFTOLOZANE AND TAZOBACTAM 100 MILLIGRAM(S): 1; .5 INJECTION, POWDER, LYOPHILIZED, FOR SOLUTION INTRAVENOUS at 06:16

## 2023-07-13 RX ADMIN — CHLORHEXIDINE GLUCONATE 1 APPLICATION(S): 213 SOLUTION TOPICAL at 05:22

## 2023-07-13 RX ADMIN — CEFTOLOZANE AND TAZOBACTAM 100 MILLIGRAM(S): 1; .5 INJECTION, POWDER, LYOPHILIZED, FOR SOLUTION INTRAVENOUS at 23:39

## 2023-07-13 RX ADMIN — CEFTOLOZANE AND TAZOBACTAM 100 MILLIGRAM(S): 1; .5 INJECTION, POWDER, LYOPHILIZED, FOR SOLUTION INTRAVENOUS at 15:09

## 2023-07-13 RX ADMIN — MIDODRINE HYDROCHLORIDE 10 MILLIGRAM(S): 2.5 TABLET ORAL at 05:22

## 2023-07-13 RX ADMIN — LIDOCAINE 1 PATCH: 4 CREAM TOPICAL at 19:15

## 2023-07-13 RX ADMIN — Medication 100 MILLIGRAM(S): at 17:45

## 2023-07-13 RX ADMIN — SODIUM ZIRCONIUM CYCLOSILICATE 5 GRAM(S): 10 POWDER, FOR SUSPENSION ORAL at 11:34

## 2023-07-13 RX ADMIN — Medication 4: at 17:45

## 2023-07-13 RX ADMIN — MIDODRINE HYDROCHLORIDE 10 MILLIGRAM(S): 2.5 TABLET ORAL at 11:35

## 2023-07-13 RX ADMIN — MUPIROCIN 1 APPLICATION(S): 20 OINTMENT TOPICAL at 05:22

## 2023-07-13 RX ADMIN — INSULIN GLARGINE 13 UNIT(S): 100 INJECTION, SOLUTION SUBCUTANEOUS at 22:25

## 2023-07-13 RX ADMIN — POLYETHYLENE GLYCOL 3350 17 GRAM(S): 17 POWDER, FOR SOLUTION ORAL at 11:35

## 2023-07-13 RX ADMIN — Medication 1 DROP(S): at 18:07

## 2023-07-13 RX ADMIN — LIDOCAINE 1 PATCH: 4 CREAM TOPICAL at 11:33

## 2023-07-13 RX ADMIN — Medication 81 MILLIGRAM(S): at 11:35

## 2023-07-13 RX ADMIN — Medication 1 DROP(S): at 06:16

## 2023-07-13 RX ADMIN — PANTOPRAZOLE SODIUM 40 MILLIGRAM(S): 20 TABLET, DELAYED RELEASE ORAL at 06:42

## 2023-07-13 NOTE — PROGRESS NOTE ADULT - SUBJECTIVE AND OBJECTIVE BOX
[facility]  INFECTIOUS DISEASES - [address], [place] [address]  Tel: [phone]     Fax: [phone]  =======================================================    SHILO KOHLER [id number]    Follow up:    Allergies:  No Known Drug Allergies  latex (Hives)      Antibiotics:  acetaminophen     Tablet .. 650 milliGRAM(s) Oral every 6 hours PRN  albumin human 25% IVPB 50 milliLiter(s) IV Intermittent <User Schedule>  artificial  tears Solution 1 Drop(s) Both EYES every 12 hours  aspirin enteric coated 81 milliGRAM(s) Oral daily  ceftolozane/tazobactam IVPB 150 milliGRAM(s) IV Intermittent every 8 hours  chlorhexidine 2% Cloths 1 Application(s) Topical <User Schedule>  dextrose 5%. 1000 milliLiter(s) IV Continuous <Continuous>  dextrose 5%. 1000 milliLiter(s) IV Continuous <Continuous>  dextrose 50% Injectable 12.5 Gram(s) IV Push once  dextrose 50% Injectable 25 Gram(s) IV Push once  dextrose 50% Injectable 25 Gram(s) IV Push once  dextrose Oral Gel 15 Gram(s) Oral once PRN  diltiazem    Tablet 60 milliGRAM(s) Oral three times a day  dronabinol 2.5 milliGRAM(s) Oral two times a day before meals  glucagon  Injectable 1 milliGRAM(s) IntraMuscular once  imipramine 50 milliGRAM(s) Oral daily  insulin glargine Injectable (LANTUS) 13 Unit(s) SubCutaneous at bedtime  insulin lispro (ADMELOG) corrective regimen sliding scale   SubCutaneous every 6 hours  lidocaine   4% Patch 1 Patch Transdermal daily  metoprolol tartrate 100 milliGRAM(s) Oral two times a day  midodrine. 10 milliGRAM(s) Oral three times a day  mirtazapine 7.5 milliGRAM(s) Oral at bedtime  mupirocin 2% Nasal 1 Application(s) Both Nostrils two times a day  pantoprazole    Tablet 40 milliGRAM(s) Oral before breakfast  polyethylene glycol 3350 17 Gram(s) Oral daily  senna 2 Tablet(s) Oral at bedtime  sodium zirconium cyclosilicate 5 Gram(s) Oral daily       REVIEW OF SYSTEMS:  CONSTITUTIONAL:  No Fever or chills  HEENT:   No sore throat or runny nose.  CARDIOVASCULAR:  No chest pain or SOB  RESPIRATORY:  No cough, shortness of breath  GASTROINTESTINAL:  No nausea, vomiting or diarrhea.  GENITOURINARY:  No dysuria, frequency or urgency  MUSCULOSKELETAL:  no joint aches, no muscle pain  SKIN:  No change in skin, hair or nails.  NEUROLOGIC:  No headache or dizziness  PSYCHIATRIC:  No disorder of thought or mood.     Physical Exam:  ICU Vital Signs Last 24 Hrs  T(C): 36.9 (13 Jul 2023 13:15), Max: 37.2 (12 Jul 2023 20:51)  T(F): 98.4 (13 Jul 2023 13:15), Max: 99 (12 Jul 2023 20:51)  HR: 104 (13 Jul 2023 13:15) (77 - 108)  BP: 121/58 (13 Jul 2023 13:15) (87/42 - 173/110)  BP(mean): 84 (13 Jul 2023 13:15) (60 - 132)  ABP: --  ABP(mean): --  RR: 21 (13 Jul 2023 13:15) (15 - 39)  SpO2: 99% (13 Jul 2023 13:15) (90% - 99%)    O2 Parameters below as of 13 Jul 2023 13:15  Patient On (Oxygen Delivery Method): room air           GEN: NAD  HEENT: normocephalic and atraumatic.  NECK: Supple.  No lymphadenopathy   LUNGS: Clear to auscultation.  HEART: Regular rate and rhythm   ABDOMEN: Soft, nontender, and nondistended.    : No CVA tenderness  EXTREMITIES: No leg edema.  MSK: no joint swelling  NEUROLOGIC: grossly intact.  PSYCHIATRIC: Appropriate affect .  SKIN: No ulceration or induration present.      Labs:  07-13    130<L>  |  95<L>  |  125<H>  ----------------------------<  94  5.2   |  19<L>  |  8.60<H>    Ca    9.8      13 Jul 2023 08:22  Phos  8.2     07-13  Mg     2.6     07-13    TPro  7.1  /  Alb  2.3<L>  /  TBili  0.5  /  DBili  x   /  AST  30  /  ALT  18  /  AlkPhos  121<H>  07-13                          11.9   18.67 )-----------( 451      ( 13 Jul 2023 08:22 )             37.0     PT/INR - ( 12 Jul 2023 05:42 )   PT: 14.6 sec;   INR: 1.25 ratio         PTT - ( 11 Jul 2023 17:09 )  PTT:21.3 sec  Urinalysis Basic - ( 13 Jul 2023 08:22 )    Color: x / Appearance: x / SG: x / pH: x  Gluc: 94 mg/dL / Ketone: x  / Bili: x / Urobili: x   Blood: x / Protein: x / Nitrite: x   Leuk Esterase: x / RBC: x / WBC x   Sq Epi: x / Non Sq Epi: x / Bacteria: x      LIVER FUNCTIONS - ( 13 Jul 2023 08:22 )  Alb: 2.3 g/dL / Pro: 7.1 g/dL / ALK PHOS: 121 U/L / ALT: 18 U/L / AST: 30 U/L / GGT: x             RECENT CULTURES:  07-10 @ 16:25 .Blood Blood     No growth at 48 Hours        07-10 @ 16:20 .Blood Blood     No growth at 48 Hours        07-03 @ 21:20 Clean Catch Clean Catch (Midstream) Pseudomonas aeruginosa (Carbapenem Resistant)  Klebsiella pneumoniae ESBL    >100,000 CFU/ml Pseudomonas aeruginosa (Carbapenem Resistant)  50,000 - 99,000 CFU/mL Klebsiella pneumoniae ESBL        07-03 @ 16:10 .Blood Blood     No growth at 5 days        07-03 @ 16:05 .Blood Blood     No growth at 5 days              All imaging and data are reviewed.    [facility]  INFECTIOUS DISEASES - [address], [place] [address]  Tel: [phone]     Fax: [phone]  =======================================================    SHILO KOHLER [id number]    Follow up: No fevers. Awake but not engaging in conversation.    Allergies:  No Known Drug Allergies  latex (Hives)      Antibiotics:  acetaminophen     Tablet .. 650 milliGRAM(s) Oral every 6 hours PRN  albumin human 25% IVPB 50 milliLiter(s) IV Intermittent <User Schedule>  artificial  tears Solution 1 Drop(s) Both EYES every 12 hours  aspirin enteric coated 81 milliGRAM(s) Oral daily  ceftolozane/tazobactam IVPB 150 milliGRAM(s) IV Intermittent every 8 hours  chlorhexidine 2% Cloths 1 Application(s) Topical <User Schedule>  dextrose 5%. 1000 milliLiter(s) IV Continuous <Continuous>  dextrose 5%. 1000 milliLiter(s) IV Continuous <Continuous>  dextrose 50% Injectable 12.5 Gram(s) IV Push once  dextrose 50% Injectable 25 Gram(s) IV Push once  dextrose 50% Injectable 25 Gram(s) IV Push once  dextrose Oral Gel 15 Gram(s) Oral once PRN  diltiazem    Tablet 60 milliGRAM(s) Oral three times a day  dronabinol 2.5 milliGRAM(s) Oral two times a day before meals  glucagon  Injectable 1 milliGRAM(s) IntraMuscular once  imipramine 50 milliGRAM(s) Oral daily  insulin glargine Injectable (LANTUS) 13 Unit(s) SubCutaneous at bedtime  insulin lispro (ADMELOG) corrective regimen sliding scale   SubCutaneous every 6 hours  lidocaine   4% Patch 1 Patch Transdermal daily  metoprolol tartrate 100 milliGRAM(s) Oral two times a day  midodrine. 10 milliGRAM(s) Oral three times a day  mirtazapine 7.5 milliGRAM(s) Oral at bedtime  mupirocin 2% Nasal 1 Application(s) Both Nostrils two times a day  pantoprazole    Tablet 40 milliGRAM(s) Oral before breakfast  polyethylene glycol 3350 17 Gram(s) Oral daily  senna 2 Tablet(s) Oral at bedtime  sodium zirconium cyclosilicate 5 Gram(s) Oral daily       REVIEW OF SYSTEMS:  unable to obtain 2/2 dementia     Physical Exam:  ICU Vital Signs Last 24 Hrs  T(C): 36.9 (13 Jul 2023 13:15), Max: 37.2 (12 Jul 2023 20:51)  T(F): 98.4 (13 Jul 2023 13:15), Max: 99 (12 Jul 2023 20:51)  HR: 104 (13 Jul 2023 13:15) (77 - 108)  BP: 121/58 (13 Jul 2023 13:15) (87/42 - 173/110)  BP(mean): 84 (13 Jul 2023 13:15) (60 - 132)  ABP: --  ABP(mean): --  RR: 21 (13 Jul 2023 13:15) (15 - 39)  SpO2: 99% (13 Jul 2023 13:15) (90% - 99%)    O2 Parameters below as of 13 Jul 2023 13:15  Patient On (Oxygen Delivery Method): room air        GEN: NAD  HEENT: normocephalic and atraumatic.  NECK: Supple.  No lymphadenopathy   LUNGS: Normal respiratory effort  HEART: Regular rate and rhythm   ABDOMEN: Soft, nontender, and nondistended.    EXTREMITIES: No leg edema.   SKIN: (+) ulcer on coccyx    Labs:  07-13    130<L>  |  95<L>  |  125<H>  ----------------------------<  94  5.2   |  19<L>  |  8.60<H>    Ca    9.8      13 Jul 2023 08:22  Phos  8.2     07-13  Mg     2.6     07-13    TPro  7.1  /  Alb  2.3<L>  /  TBili  0.5  /  DBili  x   /  AST  30  /  ALT  18  /  AlkPhos  121<H>  07-13                          11.9   18.67 )-----------( 451      ( 13 Jul 2023 08:22 )             37.0     PT/INR - ( 12 Jul 2023 05:42 )   PT: 14.6 sec;   INR: 1.25 ratio         PTT - ( 11 Jul 2023 17:09 )  PTT:21.3 sec  Urinalysis Basic - ( 13 Jul 2023 08:22 )    Color: x / Appearance: x / SG: x / pH: x  Gluc: 94 mg/dL / Ketone: x  / Bili: x / Urobili: x   Blood: x / Protein: x / Nitrite: x   Leuk Esterase: x / RBC: x / WBC x   Sq Epi: x / Non Sq Epi: x / Bacteria: x      LIVER FUNCTIONS - ( 13 Jul 2023 08:22 )  Alb: 2.3 g/dL / Pro: 7.1 g/dL / ALK PHOS: 121 U/L / ALT: 18 U/L / AST: 30 U/L / GGT: x             RECENT CULTURES:  07-10 @ 16:25 .Blood Blood     No growth at 48 Hours        07-10 @ 16:20 .Blood Blood     No growth at 48 Hours        07-03 @ 21:20 Clean Catch Clean Catch (Midstream) Pseudomonas aeruginosa (Carbapenem Resistant)  Klebsiella pneumoniae ESBL    >100,000 CFU/ml Pseudomonas aeruginosa (Carbapenem Resistant)  50,000 - 99,000 CFU/mL Klebsiella pneumoniae ESBL        07-03 @ 16:10 .Blood Blood     No growth at 5 days        07-03 @ 16:05 .Blood Blood     No growth at 5 days              All imaging and data are reviewed.

## 2023-07-13 NOTE — PROGRESS NOTE ADULT - SUBJECTIVE AND OBJECTIVE BOX
Patient is a 72y Female whom presented to the hospital with esrd on hd     PAST MEDICAL & SURGICAL HISTORY:  HTN (hypertension)      h/o Anxiety attack      Depression      h/o Myocardial infarct 2007      h/o Hepatitis A 1969  currently resolved, no symptoms      Murmur, cardiac      h/o Smoking  quitted 3/2012      Anemia secondary to renal failure      ESRD on dialysis      Falls      Ataxia      Type 2 diabetes mellitus      Peripheral vascular disease, unspecified      CAD (coronary artery disease)      Diabetes      coronary stent 2007      s/p Ovarian cyst removal      s/p surgical removal of benign Skin lesion epigastric area      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 650 milliGRAM(s) Oral every 6 hours  aspirin enteric coated 81 milliGRAM(s) Oral daily  cloNIDine 0.1 milliGRAM(s) Oral two times a day  dextrose 5%. 1000 milliLiter(s) (50 mL/Hr) IV Continuous <Continuous>  dextrose 5%. 1000 milliLiter(s) (100 mL/Hr) IV Continuous <Continuous>  dextrose 50% Injectable 25 Gram(s) IV Push once  dextrose 50% Injectable 12.5 Gram(s) IV Push once  dextrose 50% Injectable 25 Gram(s) IV Push once  diltiazem    Tablet 60 milliGRAM(s) Oral three times a day  dronabinol 2.5 milliGRAM(s) Oral two times a day before meals  glucagon  Injectable 1 milliGRAM(s) IntraMuscular once  imipramine 50 milliGRAM(s) Oral daily  insulin lispro (ADMELOG) corrective regimen sliding scale   SubCutaneous three times a day before meals  metoprolol tartrate 100 milliGRAM(s) Oral two times a day  mirtazapine 7.5 milliGRAM(s) Oral at bedtime  multivitamin 1 Tablet(s) Oral daily  pantoprazole    Tablet 40 milliGRAM(s) Oral before breakfast  senna 2 Tablet(s) Oral at bedtime  sodium chloride 0.45%. 1000 milliLiter(s) (50 mL/Hr) IV Continuous <Continuous>      Allergies    No Known Drug Allergies  latex (Hives)                          11.9   18.67 )-----------( 451      ( 13 Jul 2023 08:22 )             37.0       CBC Full  -  ( 13 Jul 2023 08:22 )  WBC Count : 18.67 K/uL  RBC Count : 3.80 M/uL  Hemoglobin : 11.9 g/dL  Hematocrit : 37.0 %  Platelet Count - Automated : 451 K/uL  Mean Cell Volume : 97.4 fl  Mean Cell Hemoglobin : 31.3 pg  Mean Cell Hemoglobin Concentration : 32.2 gm/dL  Auto Neutrophil # : 13.71 K/uL  Auto Lymphocyte # : 1.33 K/uL  Auto Monocyte # : 2.26 K/uL  Auto Eosinophil # : 0.36 K/uL  Auto Basophil # : 0.16 K/uL  Auto Neutrophil % : 73.4 %  Auto Lymphocyte % : 7.1 %  Auto Monocyte % : 12.1 %  Auto Eosinophil % : 1.9 %  Auto Basophil % : 0.9 %      07-13    130<L>  |  95<L>  |  125<H>  ----------------------------<  94  5.2   |  19<L>  |  8.60<H>    Ca    9.8      13 Jul 2023 08:22  Phos  8.2     07-13  Mg     2.6     07-13    TPro  7.1  /  Alb  2.3<L>  /  TBili  0.5  /  DBili  x   /  AST  30  /  ALT  18  /  AlkPhos  121<H>  07-13      CAPILLARY BLOOD GLUCOSE      POCT Blood Glucose.: 218 mg/dL (13 Jul 2023 17:12)  POCT Blood Glucose.: 96 mg/dL (13 Jul 2023 11:38)  POCT Blood Glucose.: 96 mg/dL (13 Jul 2023 10:15)  POCT Blood Glucose.: 76 mg/dL (13 Jul 2023 05:43)  POCT Blood Glucose.: 144 mg/dL (12 Jul 2023 22:58)      Vital Signs Last 24 Hrs  T(C): 37.2 (13 Jul 2023 20:29), Max: 37.2 (13 Jul 2023 20:29)  T(F): 99 (13 Jul 2023 20:29), Max: 99 (13 Jul 2023 20:29)  HR: 94 (13 Jul 2023 20:29) (84 - 108)  BP: 159/68 (13 Jul 2023 20:29) (87/42 - 167/76)  BP(mean): 80 (13 Jul 2023 16:00) (60 - 105)  RR: 19 (13 Jul 2023 20:29) (15 - 39)  SpO2: 97% (13 Jul 2023 20:29) (90% - 99%)    Parameters below as of 13 Jul 2023 20:29  Patient On (Oxygen Delivery Method): room air        Urinalysis Basic - ( 13 Jul 2023 08:22 )    Color: x / Appearance: x / SG: x / pH: x  Gluc: 94 mg/dL / Ketone: x  / Bili: x / Urobili: x   Blood: x / Protein: x / Nitrite: x   Leuk Esterase: x / RBC: x / WBC x   Sq Epi: x / Non Sq Epi: x / Bacteria: x        PT/INR - ( 12 Jul 2023 05:42 )   PT: 14.6 sec;   INR: 1.25 ratio         Intolerances                                  SOCIAL HISTORY:  Denies ETOh,Smoking,     FAMILY HISTORY:  FH: myocardial infarction (Father)    FH: myocardial infarction (Father)    Family history of type 2 diabetes mellitus in brother (Sibling)        REVIEW OF SYSTEMS:    unable to obtained a good review system                     PHYSICAL EXAM:    Constitutional: NAD  HEENT: conjunctive   clear   Neck:  No JVD  Respiratory: CTAB  Cardiovascular: S1 and S2  Gastrointestinal: BS+, soft, NT/ND  Extremities: No peripheral edema  Neurological:  no focal deficits  pos fistula

## 2023-07-13 NOTE — PROGRESS NOTE ADULT - ASSESSMENT
72-year-old female with significant past medical history for hypertension, diabetes, dementia, end-stage renal disease on hemodialysis presents to the ED status post unwitnessed fall from [facility].  Patient states that she heard some voices then rolled out of bed.  Patient complaining of right hip pain.  Unknown head trauma.  + Eliquis < from: Xray Femur showed  Fractures including the medial wall of the acetabulum and inferior right pubic ramus Admitted for pain management ,PT/OT

## 2023-07-13 NOTE — PROGRESS NOTE ADULT - ASSESSMENT
72-year-old female former smoker quit 3/2012  with significant past medical history for hypertension, CAD sp cabg  PVD  diabetes, dementia, end-stage renal disease on hemodialysis   a fib on eliquis sp recent hosp stay 5/17-5/24/2023  admitted 3/7-3/11/2023 and before that 2/22-2/25/2023   . During 5/2023 admission she had   . ENTERORHINOVIRUS INFECTION RESP TRACT 5/17  . PLEURAL EFFUSION SP l THORA 5/18 TRANSUDATE   Patient now  presented to the ED 7/1/2023 status post unwitnessed fall from [facility].  Patient states that she heard some voices then rolled out of bed.  Patient complaining of right hip pain.  Unknown head trauma.  + Ashleedeangelo     She was found to have acetabular fracture which Ortho was contacted and they plan non operative management Pt was admitted to [facility] for bleed as she was on apixaban and was noted to have pl effsn    . 7/1/2023  I was asked to admit pt                      (01 Jul 2023 13:32)      esrd on hd plan for hd today        ANEMIA PLAN:  Anemia of chronic disease:  We will continue epo aiming for a HCT of 32-36 %.   We will monitor Iron stores, B12 and RBC folate .      BP monitoring,continue current midodrine ,    Accuchecks monitoring and insulin sliding scale coverage, no concentrated sweets diet, serial labs and f/up with PMD, monitor HB A 1 C every 3-4 mnth

## 2023-07-13 NOTE — SWALLOW BEDSIDE ASSESSMENT ADULT - ASR SWALLOW ASPIRATION MONITOR
change of breathing pattern/oral hygiene/position upright (90Y)/cough/gurgly voice/fever/pneumonia/throat clearing/upper respiratory infection
if any s/s penetration/aspiration noted, d/c PO & initiate NPO with SLP to reassess/change of breathing pattern/oral hygiene/position upright (90Y)/cough/gurgly voice/fever/pneumonia/throat clearing/upper respiratory infection

## 2023-07-13 NOTE — PROGRESS NOTE ADULT - SUBJECTIVE AND OBJECTIVE BOX
Neurology follow up note    SHILO [id number]      Interval History:    Patient resting in bed     Allergies    No Known Drug Allergies  latex (Hives)    Intolerances        MEDICATIONS    acetaminophen     Tablet .. 650 milliGRAM(s) Oral every 6 hours PRN  artificial  tears Solution 1 Drop(s) Both EYES every 12 hours  aspirin enteric coated 81 milliGRAM(s) Oral daily  ceftolozane/tazobactam IVPB 150 milliGRAM(s) IV Intermittent every 8 hours  chlorhexidine 2% Cloths 1 Application(s) Topical <User Schedule>  dextrose 5%. 1000 milliLiter(s) IV Continuous <Continuous>  dextrose 5%. 1000 milliLiter(s) IV Continuous <Continuous>  dextrose 50% Injectable 12.5 Gram(s) IV Push once  dextrose 50% Injectable 25 Gram(s) IV Push once  dextrose 50% Injectable 25 Gram(s) IV Push once  dextrose Oral Gel 15 Gram(s) Oral once PRN  diltiazem    Tablet 60 milliGRAM(s) Oral three times a day  dronabinol 2.5 milliGRAM(s) Oral two times a day before meals  glucagon  Injectable 1 milliGRAM(s) IntraMuscular once  imipramine 50 milliGRAM(s) Oral daily  insulin glargine Injectable (LANTUS) 15 Unit(s) SubCutaneous at bedtime  insulin lispro (ADMELOG) corrective regimen sliding scale   SubCutaneous every 6 hours  lidocaine   4% Patch 1 Patch Transdermal daily  metoprolol tartrate 100 milliGRAM(s) Oral two times a day  midodrine. 10 milliGRAM(s) Oral three times a day  mirtazapine 7.5 milliGRAM(s) Oral at bedtime  mupirocin 2% Nasal 1 Application(s) Both Nostrils two times a day  pantoprazole    Tablet 40 milliGRAM(s) Oral before breakfast  polyethylene glycol 3350 17 Gram(s) Oral daily  senna 2 Tablet(s) Oral at bedtime  sodium zirconium cyclosilicate 5 Gram(s) Oral daily              Vital Signs Last 24 Hrs  T(C): 36.6 (13 Jul 2023 07:57), Max: 37.3 (12 Jul 2023 11:47)  T(F): 97.9 (13 Jul 2023 07:57), Max: 99.1 (12 Jul 2023 11:47)  HR: 92 (13 Jul 2023 08:00) (77 - 96)  BP: 122/56 (13 Jul 2023 08:00) (81/53 - 173/110)  BP(mean): 81 (13 Jul 2023 08:00) (58 - 132)  RR: 43 (13 Jul 2023 08:00) (15 - 43)  SpO2: 97% (13 Jul 2023 08:00) (94% - 100%)    Parameters below as of 13 Jul 2023 08:00  Patient On (Oxygen Delivery Method): room air      REVIEW OF SYSTEMS:  Could not be obtained secondary to the patient being lethargic, but as per my conversation with spouse, she is wheelchair bound.  For the last few weeks, she has been having limited verbal output.    PHYSICAL EXAMINATION:   HEENT:  Head:  Normocephalic, atraumatic.  Eyes:  No scleral icterus.  Ears:  Hearing hard to evaluate secondary to the patient being lethargic.  NECK:  Supple.  RESPIRATORY:  Air entry bilaterally.  CARDIOVASCULAR:  S1 and S2 heard.  ABDOMEN:  Soft and nontender.  EXTREMITIES:  No clubbing or cyanosis was noted.      NEUROLOGIC:  The patient was arousable to verbal stimuli, opens eyes.  Pupils bilaterally were 4 mm, slight reactivity, on-off blink to visual threat.  To painful stimuli, the patient would say the word "ok"  but would not put sentences together.  As per my conversation with spouse, lately she is becoming less and less verbal.  Motor:  With painful stimuli, elevated bilateral upper extremities with a slow drop, would say after 5 seconds, both fell to the ground, bilateral lower extremities were in a contracted position.  The patient does have a right hip flexor, is wheelchair bound, increased tone, slight movement of the feet with stimuli.    LABS:  CBC Full  -  ( 13 Jul 2023 08:22 )  WBC Count : 18.67 K/uL  RBC Count : 3.80 M/uL  Hemoglobin : 11.9 g/dL  Hematocrit : 37.0 %  Platelet Count - Automated : 451 K/uL  Mean Cell Volume : 97.4 fl  Mean Cell Hemoglobin : 31.3 pg  Mean Cell Hemoglobin Concentration : 32.2 gm/dL  Auto Neutrophil # : 13.71 K/uL  Auto Lymphocyte # : 1.33 K/uL  Auto Monocyte # : 2.26 K/uL  Auto Eosinophil # : 0.36 K/uL  Auto Basophil # : 0.16 K/uL  Auto Neutrophil % : 73.4 %  Auto Lymphocyte % : 7.1 %  Auto Monocyte % : 12.1 %  Auto Eosinophil % : 1.9 %  Auto Basophil % : 0.9 %    Urinalysis Basic - ( 12 Jul 2023 05:42 )    Color: x / Appearance: x / SG: x / pH: x  Gluc: 222 mg/dL / Ketone: x  / Bili: x / Urobili: x   Blood: x / Protein: x / Nitrite: x   Leuk Esterase: x / RBC: x / WBC x   Sq Epi: x / Non Sq Epi: x / Bacteria: x      07-12    133<L>  |  93<L>  |  111<H>  ----------------------------<  222<H>  5.3   |  26  |  7.90<H>    Ca    10.3<H>      12 Jul 2023 05:42  Mg     2.6     07-13    TPro  7.7  /  Alb  2.8<L>  /  TBili  0.5  /  DBili  x   /  AST  32  /  ALT  21  /  AlkPhos  128<H>  07-12    Hemoglobin A1C:     LIVER FUNCTIONS - ( 12 Jul 2023 05:42 )  Alb: 2.8 g/dL / Pro: 7.7 g/dL / ALK PHOS: 128 U/L / ALT: 21 U/L / AST: 32 U/L / GGT: x           Vitamin B12   PT/INR - ( 12 Jul 2023 05:42 )   PT: 14.6 sec;   INR: 1.25 ratio         PTT - ( 11 Jul 2023 17:09 )  PTT:21.3 sec      RADIOLOGY    ANALYSIS AND PLAN:  This is a 72-year-old with episode of unresponsiveness and altered mental status.  For episode of unresponsiveness and altered mental status, suspect most likely metabolic encephalopathy secondary to underlying infectious type process, possibly urinary tract, also questionable the patient had an event a few weeks ago.  As per previous notes a few months ago, the patient was more verbal and interactive, but as per my conversation with spouse, he states for the last few weeks, she has been speaking less with less interaction, questionable any type of new central nervous system event occurred a few weeks ago.  For history of underlying mild cognitive impairment versus subtle dementia secondary to the patient appears to be advanced, would recommend supportive therapy.  For history of hypertension, monitor systolic blood pressure, avoid hypotension if possible.  Please maintain a systolic blood pressure between 140 and 100.  today more interactive  mri reviewed no significant changes if needed cleared for AC risk vs benefits     Spoke with spouse, Damir, at [phone] 7/12 called today went to [facility] 7/13 902am    Greater than 45 minutes of time was spent with the patient, plan of care, reviewing data, with greater than 50% of the visit was spent counseling and/or coordinating care with multidisciplinary healthcare team

## 2023-07-13 NOTE — PROGRESS NOTE ADULT - ASSESSMENT
73YO F PMH hypertension, diabetes, dementia, end-stage renal disease on hemodialysis, who presented status post unwitnessed fall from [employer]- rolled out of bed. Patient complained of right hip pain--found to have fractures including the medial wall of the acetabulum and inferior right pubic ramus. Patient found to have leukocytosis, initially thought to be reactive 2/2 hip fracture.    7/4: started on empiric cefepime on 7/4 given new fever  7/7: CT chest and A/P done, showed L pleural effusion but no signs of infection  7/8: Broadened to meropenem given ESBL klebsiella in urine  7/11: RRT called for unresponsiveness, CT head showed potential hemorrhage; given worsening leukocytosis to 26 of unclear etiology, started on Zerbaxa to cover CRE pseudomonas in urine  7/12: transferred to ICU  7/13: no fever and leukocytosis improving, repeat blood cultures remain no growth; MRSA nasal PCR positive but low suspicion for MRSA infection, seen by Wound Care and no signs of wound infection    #Leukocytosis  #Positive urine culture    -continue ceftolozane-tazobactam (renally dosed)--plan to complete 5-7 day course  -nasal mupirocin x 5 days  -monitor WBC  -follow cultures to completion  -aspiration precautions  -wound care  -discussed with Dr. Kimberli Tsai MD  Division of infectious Diseases  Cell [phone] between 8am and 6pm  After 6pm and over the weekends please call ID service line at [phone].

## 2023-07-13 NOTE — SWALLOW BEDSIDE ASSESSMENT ADULT - SLP PERTINENT HISTORY OF CURRENT PROBLEM
+ history of dysphagia
Pt seen this admission for swallow eval 7/4/23 rx Minced & moist with thin liquids, see report for details.

## 2023-07-13 NOTE — PROGRESS NOTE ADULT - SUBJECTIVE AND OBJECTIVE BOX
Patient is a 72y Female with a known history of :  Hip fracture [S72.009A]    Closed pelvic fracture [S32.9XXA]    Pubic ramus fracture [S32.599A]    Right acetabular fracture [S32.401A]    ESRD on dialysis [N18.6]    Prophylactic measure [Z29.9]    HTN (hypertension) [I10]    Fall [W19.XXXA]    Pleural effusion [J90]    Acute febrile illness [R50.9]    Type 2 diabetes mellitus [E11.9]    Sacral decubitus ulcer, stage IV [L89.154]    ICH (intracerebral hemorrhage) [I61.9]    Encephalopathy, unspecified [G93.40]      HPI:  72-year-old female with significant past medical history for hypertension, diabetes, dementia, end-stage renal disease on hemodialysis presents to the ED status post unwitnessed fall from [facility].  Patient states that she heard some voices then rolled out of bed.  Patient complaining of right hip pain.  Unknown head trauma.  + Eliquis < from: Xray Femur showed  Fractures including the medial wall of the acetabulum and inferior right pubic ramus Admitted for pain management ,PT/OT        (01 Jul 2023 15:24)      REVIEW OF SYSTEMS:    CONSTITUTIONAL: No fever, weight loss, or fatigue  EYES: No eye pain, visual disturbances, or discharge  ENMT:  No difficulty hearing, tinnitus, vertigo; No sinus or throat pain  NECK: No pain or stiffness  BREASTS: No pain, masses, or nipple discharge  RESPIRATORY: No cough, wheezing, chills or hemoptysis; No shortness of breath  CARDIOVASCULAR: No chest pain, palpitations, dizziness, or leg swelling  GASTROINTESTINAL: No abdominal or epigastric pain. No nausea, vomiting, or hematemesis; No diarrhea or constipation. No melena or hematochezia.  GENITOURINARY: No dysuria, frequency, hematuria, or incontinence  NEUROLOGICAL: No headaches, memory loss, loss of strength, numbness, or tremors  SKIN: No itching, burning, rashes, or lesions   LYMPH NODES: No enlarged glands  ENDOCRINE: No heat or cold intolerance; No hair loss  MUSCULOSKELETAL: No joint pain or swelling; No muscle, back, or extremity pain  PSYCHIATRIC: No depression, anxiety, mood swings, or difficulty sleeping  HEME/LYMPH: No easy bruising, or bleeding gums  ALLERGY AND IMMUNOLOGIC: No hives or eczema    MEDICATIONS  (STANDING):  artificial  tears Solution 1 Drop(s) Both EYES every 12 hours  aspirin enteric coated 81 milliGRAM(s) Oral daily  ceftolozane/tazobactam IVPB 150 milliGRAM(s) IV Intermittent every 8 hours  chlorhexidine 2% Cloths 1 Application(s) Topical <User Schedule>  dextrose 5%. 1000 milliLiter(s) (50 mL/Hr) IV Continuous <Continuous>  dextrose 5%. 1000 milliLiter(s) (100 mL/Hr) IV Continuous <Continuous>  dextrose 50% Injectable 12.5 Gram(s) IV Push once  dextrose 50% Injectable 25 Gram(s) IV Push once  dextrose 50% Injectable 25 Gram(s) IV Push once  diltiazem    Tablet 60 milliGRAM(s) Oral three times a day  dronabinol 2.5 milliGRAM(s) Oral two times a day before meals  glucagon  Injectable 1 milliGRAM(s) IntraMuscular once  imipramine 50 milliGRAM(s) Oral daily  insulin glargine Injectable (LANTUS) 15 Unit(s) SubCutaneous at bedtime  insulin lispro (ADMELOG) corrective regimen sliding scale   SubCutaneous every 6 hours  lidocaine   4% Patch 1 Patch Transdermal daily  metoprolol tartrate 100 milliGRAM(s) Oral two times a day  midodrine. 10 milliGRAM(s) Oral three times a day  mirtazapine 7.5 milliGRAM(s) Oral at bedtime  mupirocin 2% Nasal 1 Application(s) Both Nostrils two times a day  pantoprazole    Tablet 40 milliGRAM(s) Oral before breakfast  polyethylene glycol 3350 17 Gram(s) Oral daily  senna 2 Tablet(s) Oral at bedtime  sodium zirconium cyclosilicate 5 Gram(s) Oral daily    MEDICATIONS  (PRN):  acetaminophen     Tablet .. 650 milliGRAM(s) Oral every 6 hours PRN Temp greater or equal to 38C (100.4F), Mild Pain (1 - 3)  dextrose Oral Gel 15 Gram(s) Oral once PRN Blood Glucose LESS THAN 70 milliGRAM(s)/deciliter      ALLERGIES: No Known Drug Allergies  latex (Hives)      FAMILY HISTORY:  FH: myocardial infarction (Father)    FH: myocardial infarction (Father)    Family history of type 2 diabetes mellitus in brother (Sibling)        PHYSICAL EXAMINATION:  -----------------------------  T(C): 36.6 (07-13-23 @ 07:57), Max: 37.3 (07-12-23 @ 11:47)  HR: 90 (07-13-23 @ 09:00) (77 - 96)  BP: 107/67 (07-13-23 @ 09:00) (81/53 - 173/110)  RR: 16 (07-13-23 @ 09:00) (15 - 39)  SpO2: 98% (07-13-23 @ 09:00) (94% - 99%)  Wt(kg): --    07-12 @ 07:01  -  07-13 @ 07:00  --------------------------------------------------------  IN:    IV PiggyBack: 300 mL    Lactated Ringers Bolus: 500 mL  Total IN: 800 mL    OUT:    Voided (mL): 0 mL  Total OUT: 0 mL    Total NET: 800 mL            VITALS  T(C): 36.6 (07-13-23 @ 07:57), Max: 37.3 (07-12-23 @ 11:47)  HR: 90 (07-13-23 @ 09:00) (77 - 96)  BP: 107/67 (07-13-23 @ 09:00) (81/53 - 173/110)  RR: 16 (07-13-23 @ 09:00) (15 - 39)  SpO2: 98% (07-13-23 @ 09:00) (94% - 99%)    Constitutional: well developed, normal appearance, well groomed, well nourished, no deformities and no acute distress.   Eyes: the conjunctiva exhibited no abnormalities and the eyelids demonstrated no xanthelasmas.   HEENT: normal oral mucosa, no oral pallor and no oral cyanosis.   Neck: normal jugular venous A waves present, normal jugular venous V waves present and no jugular venous wilson A waves.   Pulmonary: no respiratory distress, normal respiratory rhythm and effort, no accessory muscle use and lungs were clear to auscultation bilaterally.   Cardiovascular: heart rate and rhythm were normal, normal S1 and S2 and no murmur, gallop, rub, heave or thrill are present.   Abdomen: soft, non-tender, no hepato-splenomegaly and no abdominal mass palpated.   Musculoskeletal: the gait could not be assessed..   Extremities: no clubbing of the fingernails, no localized cyanosis, no petechial hemorrhages and no ischemic changes.   Skin: normal skin color and pigmentation, no rash, no venous stasis, no skin lesions, no skin ulcer and no xanthoma was observed.   Psychiatric: oriented to person, place, and time, the affect was normal, the mood was normal and not feeling anxious.     LABS:   --------  07-13    130<L>  |  95<L>  |  125<H>  ----------------------------<  94  5.2   |  19<L>  |  8.60<H>    Ca    9.8      13 Jul 2023 08:22  Phos  8.2     07-13  Mg     2.6     07-13    TPro  7.1  /  Alb  2.3<L>  /  TBili  0.5  /  DBili  x   /  AST  30  /  ALT  18  /  AlkPhos  121<H>  07-13                         11.9   18.67 )-----------( 451      ( 13 Jul 2023 08:22 )             37.0     PT/INR - ( 12 Jul 2023 05:42 )   PT: 14.6 sec;   INR: 1.25 ratio         PTT - ( 11 Jul 2023 17:09 )  PTT:21.3 sec            RADIOLOGY:  -----------------    ECG:     ECHO:

## 2023-07-13 NOTE — CHART NOTE - NSCHARTNOTEFT_GEN_A_CORE
Assessment/Follow up: Pt lethargic at visit. NPO past 3 days. SLP evaluation pending. GI wdl, last BM 7/11. Bowel regimen rx. Hx uncontrolled DM, on 15units lantus q hs plus ss covg. Previously with hyperglycemia and insulin increased from 10 units to 15units qhs. Now with episode hypoglycemia BS 76 (7/13). Consider reducing insulin to prevent further hypoglycemia. Recommend D5 per MD order while NPO. Worsening hyponatremia. Elevated K 5.2, phos 8.2(7/13). Plan for HD treatment today. Recommend phosphate binders per MD order. Per MOLST pt DNR/DNI/No Tube Feeding. Will remain available for po diet pending SLP evaluation. Recommend renal, consistent carbohydrate diet with 1L po FR  (diet texture/liquid consistency per SLP). Recommend nepro 8oz po BID as medically feasible.     Factors impacting intake: [ ] none [ ] nausea  [ ] vomiting [ ] diarrhea [ ] constipation  [ ]chewing problems [x ] swallowing issues  [x ] other: lethargy    Diet Presciption: Diet, NPO:   Except Medications     Special Instructions for Nursing:  Except Medications (07-11-23 @ 21:32)    Intake: NPO past 3 days    Current Weight: Weight (kg): 50.1 (07-11 @ 21:45); 114.1lbs(7/13), left upper arm 2+edema      Pertinent Medications: MEDICATIONS  (STANDING):  artificial  tears Solution 1 Drop(s) Both EYES every 12 hours  aspirin enteric coated 81 milliGRAM(s) Oral daily  ceftolozane/tazobactam IVPB 150 milliGRAM(s) IV Intermittent every 8 hours  chlorhexidine 2% Cloths 1 Application(s) Topical <User Schedule>  dextrose 5%. 1000 milliLiter(s) (100 mL/Hr) IV Continuous <Continuous>  dextrose 5%. 1000 milliLiter(s) (50 mL/Hr) IV Continuous <Continuous>  dextrose 50% Injectable 12.5 Gram(s) IV Push once  dextrose 50% Injectable 25 Gram(s) IV Push once  dextrose 50% Injectable 25 Gram(s) IV Push once  diltiazem    Tablet 60 milliGRAM(s) Oral three times a day  dronabinol 2.5 milliGRAM(s) Oral two times a day before meals  glucagon  Injectable 1 milliGRAM(s) IntraMuscular once  imipramine 50 milliGRAM(s) Oral daily  insulin glargine Injectable (LANTUS) 15 Unit(s) SubCutaneous at bedtime  insulin lispro (ADMELOG) corrective regimen sliding scale   SubCutaneous every 6 hours  lidocaine   4% Patch 1 Patch Transdermal daily  metoprolol tartrate 100 milliGRAM(s) Oral two times a day  midodrine. 10 milliGRAM(s) Oral three times a day  mirtazapine 7.5 milliGRAM(s) Oral at bedtime  mupirocin 2% Nasal 1 Application(s) Both Nostrils two times a day  pantoprazole    Tablet 40 milliGRAM(s) Oral before breakfast  polyethylene glycol 3350 17 Gram(s) Oral daily  senna 2 Tablet(s) Oral at bedtime  sodium zirconium cyclosilicate 5 Gram(s) Oral daily    MEDICATIONS  (PRN):  acetaminophen     Tablet .. 650 milliGRAM(s) Oral every 6 hours PRN Temp greater or equal to 38C (100.4F), Mild Pain (1 - 3)  dextrose Oral Gel 15 Gram(s) Oral once PRN Blood Glucose LESS THAN 70 milliGRAM(s)/deciliter    Pertinent Labs: 07-13 Na130 mmol/L<L> Glu 94 mg/dL K+ 5.2 mmol/L Cr  8.60 mg/dL<H>  mg/dL<H> 07-13 Phos 8.2 mg/dL<H> 07-13 Alb 2.3 g/dL<L>     CAPILLARY BLOOD GLUCOSE      POCT Blood Glucose.: 76 mg/dL (13 Jul 2023 05:43)  POCT Blood Glucose.: 144 mg/dL (12 Jul 2023 22:58)  POCT Blood Glucose.: 134 mg/dL (12 Jul 2023 17:43)  POCT Blood Glucose.: 241 mg/dL (12 Jul 2023 12:06)    Skin: coccyx unstageable, left medical malleolus SDTI. Jaskaran 11.     Estimated Needs:   [x ] no change since previous assessment  [ ] recalculated:     Previous Nutrition Diagnosis:   [ ] Inadequate Energy Intake [ ]Inadequate Oral Intake [ ] Excessive Energy Intake   [ ] Underweight [x ] Increased Nutrient Needs [ ] Overweight/Obesity   [ ] Altered GI Function [ ] Unintended Weight Loss [ ] Food & Nutrition Related Knowledge Deficit [x ] Malnutrition     Nutrition Diagnosis is [x ] ongoing  [ ] resolved [ ] not applicable     New Nutrition Diagnosis: [ x] not applicable       Interventions:   Recommend  [x ] Change Diet To: Po diet pending SLP evaluation. As feasible consistent carbohydrate, renal diet with 1Lpo FR ( recommendations per SLP on diet texture/liquid consistency).  [x ] Nutrition Supplement: Nepro 8oz BID as medically feasible.   [ ] Nutrition Support  [x ] Other: Nephrovite daily    Monitoring and Evaluation:   [ x] PO intake [ x ] Tolerance to diet prescription [ x ] weights [ x ] labs[ x ] follow up per protocol  [ ] other: Assessment/Follow up: Pt lethargic at visit. NPO past 3 days. SLP evaluation pending. GI wdl, last BM 7/11. Bowel regimen rx. Hx uncontrolled DM, on 15units lantus q hs plus ss covg. Previously with hyperglycemia and insulin increased from 10 units to 15units qhs. Now with episode hypoglycemia BS 76 (7/13). Consider reducing insulin to prevent further hypoglycemia. Recommend D5 per MD order while NPO. Worsening hyponatremia. Elevated K 5.2, phos 8.2(7/13). Plan for HD treatment today. Recommend phosphate binders per MD order. Per MOLST pt DNR/DNI/No Tube Feeding. Will remain available for po diet pending SLP evaluation. Recommend renal, consistent carbohydrate diet with 1L po FR  (diet texture/liquid consistency per SLP). Recommend nepro 8oz po BID as medically feasible.     Factors impacting intake: [ ] none [ ] nausea  [ ] vomiting [ ] diarrhea [ ] constipation  [ ]chewing problems [x ] swallowing issues  [x ] other: lethargy    Diet Presciption: Diet, NPO:   Except Medications     Special Instructions for Nursing:  Except Medications (07-11-23 @ 21:32)    Intake: NPO past 3 days    Current Weight: Weight (kg): 50.1 (07-11 @ 21:45); 114.1lbs(7/13), left upper arm 2+edema      Pertinent Medications: MEDICATIONS  (STANDING):  artificial  tears Solution 1 Drop(s) Both EYES every 12 hours  aspirin enteric coated 81 milliGRAM(s) Oral daily  ceftolozane/tazobactam IVPB 150 milliGRAM(s) IV Intermittent every 8 hours  chlorhexidine 2% Cloths 1 Application(s) Topical <User Schedule>  dextrose 5%. 1000 milliLiter(s) (100 mL/Hr) IV Continuous <Continuous>  dextrose 5%. 1000 milliLiter(s) (50 mL/Hr) IV Continuous <Continuous>  dextrose 50% Injectable 12.5 Gram(s) IV Push once  dextrose 50% Injectable 25 Gram(s) IV Push once  dextrose 50% Injectable 25 Gram(s) IV Push once  diltiazem    Tablet 60 milliGRAM(s) Oral three times a day  dronabinol 2.5 milliGRAM(s) Oral two times a day before meals  glucagon  Injectable 1 milliGRAM(s) IntraMuscular once  imipramine 50 milliGRAM(s) Oral daily  insulin glargine Injectable (LANTUS) 15 Unit(s) SubCutaneous at bedtime  insulin lispro (ADMELOG) corrective regimen sliding scale   SubCutaneous every 6 hours  lidocaine   4% Patch 1 Patch Transdermal daily  metoprolol tartrate 100 milliGRAM(s) Oral two times a day  midodrine. 10 milliGRAM(s) Oral three times a day  mirtazapine 7.5 milliGRAM(s) Oral at bedtime  mupirocin 2% Nasal 1 Application(s) Both Nostrils two times a day  pantoprazole    Tablet 40 milliGRAM(s) Oral before breakfast  polyethylene glycol 3350 17 Gram(s) Oral daily  senna 2 Tablet(s) Oral at bedtime  sodium zirconium cyclosilicate 5 Gram(s) Oral daily    MEDICATIONS  (PRN):  acetaminophen     Tablet .. 650 milliGRAM(s) Oral every 6 hours PRN Temp greater or equal to 38C (100.4F), Mild Pain (1 - 3)  dextrose Oral Gel 15 Gram(s) Oral once PRN Blood Glucose LESS THAN 70 milliGRAM(s)/deciliter    Pertinent Labs: 07-13 Na130 mmol/L<L> Glu 94 mg/dL K+ 5.2 mmol/L Cr  8.60 mg/dL<H>  mg/dL<H> 07-13 Phos 8.2 mg/dL<H> 07-13 Alb 2.3 g/dL<L>     CAPILLARY BLOOD GLUCOSE      POCT Blood Glucose.: 76 mg/dL (13 Jul 2023 05:43)  POCT Blood Glucose.: 144 mg/dL (12 Jul 2023 22:58)  POCT Blood Glucose.: 134 mg/dL (12 Jul 2023 17:43)  POCT Blood Glucose.: 241 mg/dL (12 Jul 2023 12:06)    Skin: coccyx unstageable, left medical malleolus SDTI. Jaskaran 11.     Estimated Needs:   [x ] no change since previous assessment  [ ] recalculated:     Previous Nutrition Diagnosis:   [ ] Inadequate Energy Intake [ ]Inadequate Oral Intake [ ] Excessive Energy Intake   [ ] Underweight [x ] Increased Nutrient Needs [ ] Overweight/Obesity   [ ] Altered GI Function [ ] Unintended Weight Loss [ ] Food & Nutrition Related Knowledge Deficit [x ] Malnutrition     Nutrition Diagnosis is [x ] ongoing  [ ] resolved [ ] not applicable     New Nutrition Diagnosis: [ x] not applicable       Interventions:   Recommend  [x ] Change Diet To: Po diet pending SLP evaluation. As feasible consistent carbohydrate, renal diet with 1Lpo FR ( recommendations per SLP on diet texture/liquid consistency).  [x ] Nutrition Supplement: Nepro 8oz BID as medically feasible.   [ ] Nutrition Support  [x ] Other: Nephrovite daily. D5 per MD order while NPO. Insulin adjustments to prevent hypoglycemia.     Monitoring and Evaluation:   [ x] PO intake [ x ] Tolerance to diet prescription [ x ] weights [ x ] labs[ x ] follow up per protocol  [ ] other:

## 2023-07-13 NOTE — SWALLOW BEDSIDE ASSESSMENT ADULT - SLP GENERAL OBSERVATIONS
Pt received upright in bed A&A Ox1, reduced cognition, on RA, pain scale 0/10 pre & post eval
Pt AxA, fatigued but cooperative throughout

## 2023-07-13 NOTE — SWALLOW BEDSIDE ASSESSMENT ADULT - DIET PRIOR TO ADMI
Chopped with Thin per transfer paperwork from [facility]
Minced moist with mildly thick as per transfer paperwork from [facility]

## 2023-07-13 NOTE — PROGRESS NOTE ADULT - SUBJECTIVE AND OBJECTIVE BOX
CHIEF COMPLAINT/ REASON FOR VISIT  .. Patient was seen to address the  issue listed under PROBLEM LIST which is located toward bottom of this note     SHILO KOHLER    [facility] 01A    Allergies    No Known Drug Allergies  latex (Hives)    Intolerances        PAST MEDICAL & SURGICAL HISTORY:  HTN (hypertension)      h/o Anxiety attack      Depression      h/o Myocardial infarct 2007      h/o Hepatitis A 1969  currently resolved, no symptoms      Murmur, cardiac      h/o Smoking  quitted 3/2012      Anemia secondary to renal failure      ESRD on dialysis      Falls      Ataxia      Type 2 diabetes mellitus      Peripheral vascular disease, unspecified      CAD (coronary artery disease)      Diabetes      coronary stent 2007      s/p Ovarian cyst removal      s/p surgical removal of benign Skin lesion epigastric area      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          FAMILY HISTORY:  FH: myocardial infarction (Father)    FH: myocardial infarction (Father)    Family history of type 2 diabetes mellitus in brother (Sibling)        Home Medications:  acetaminophen 325 mg oral tablet: 2 tab(s) orally every 6 hours as needed (02 Jul 2023 15:24)  Admelog SoloStar 100 units/mL injectable solution: injectable 3 times a day (before meals)sliding scale  (02 Jul 2023 15:24)  apixaban 2.5 mg oral tablet: 1 tab(s) orally 2 times a day (02 Jul 2023 15:24)  aspirin 81 mg oral delayed release tablet: 1 tab(s) orally once a day (at bedtime) (02 Jul 2023 15:24)  atorvastatin 20 mg oral tablet: 1 tab(s) orally once a day (at bedtime) (02 Jul 2023 15:24)  bacitracin 500 units/g topical ointment: Apply topically to affected area once a day to right knee abrasion (02 Jul 2023 11:54)  Basaglar KwikPen 100 units/mL subcutaneous solution: 8 international unit(s) subcutaneous once a day (at bedtime) (02 Jul 2023 15:24)  cloNIDine 0.1 mg oral tablet: 1 tab(s) orally 2 times a day (02 Jul 2023 15:24)  DilTIAZem (Eqv-Cardizem CD) 180 mg/24 hours oral capsule, extended release: 1 cap(s) orally once a day (02 Jul 2023 15:24)  imipramine 50 mg oral tablet: 1 tab(s) orally once a day (in the evening) (02 Jul 2023 15:24)  metoprolol tartrate 100 mg oral tablet: 1 tab(s) orally 2 times a day (02 Jul 2023 15:24)  mirtazapine 7.5 mg oral tablet: 1 tab(s) orally once a day (at bedtime) (02 Jul 2023 15:24)  Nephro-Alfie oral tablet: 1 tab(s) orally once a day (02 Jul 2023 15:24)  PANTOPRAZOLE 40MG DR TAB: 1 tab(s) orally once a day (02 Jul 2023 15:24)  senna leaf extract oral tablet: 2 tab(s) orally once a day (at bedtime) (02 Jul 2023 15:24)      MEDICATIONS  (STANDING):  artificial  tears Solution 1 Drop(s) Both EYES every 12 hours  aspirin enteric coated 81 milliGRAM(s) Oral daily  ceftolozane/tazobactam IVPB 150 milliGRAM(s) IV Intermittent every 8 hours  chlorhexidine 2% Cloths 1 Application(s) Topical <User Schedule>  dextrose 5%. 1000 milliLiter(s) (50 mL/Hr) IV Continuous <Continuous>  dextrose 5%. 1000 milliLiter(s) (100 mL/Hr) IV Continuous <Continuous>  dextrose 50% Injectable 12.5 Gram(s) IV Push once  dextrose 50% Injectable 25 Gram(s) IV Push once  dextrose 50% Injectable 25 Gram(s) IV Push once  diltiazem    Tablet 60 milliGRAM(s) Oral three times a day  dronabinol 2.5 milliGRAM(s) Oral two times a day before meals  glucagon  Injectable 1 milliGRAM(s) IntraMuscular once  imipramine 50 milliGRAM(s) Oral daily  insulin glargine Injectable (LANTUS) 15 Unit(s) SubCutaneous at bedtime  insulin lispro (ADMELOG) corrective regimen sliding scale   SubCutaneous every 6 hours  lidocaine   4% Patch 1 Patch Transdermal daily  metoprolol tartrate 100 milliGRAM(s) Oral two times a day  midodrine. 10 milliGRAM(s) Oral three times a day  mirtazapine 7.5 milliGRAM(s) Oral at bedtime  mupirocin 2% Nasal 1 Application(s) Both Nostrils two times a day  pantoprazole    Tablet 40 milliGRAM(s) Oral before breakfast  polyethylene glycol 3350 17 Gram(s) Oral daily  senna 2 Tablet(s) Oral at bedtime  sodium zirconium cyclosilicate 5 Gram(s) Oral daily    MEDICATIONS  (PRN):  acetaminophen     Tablet .. 650 milliGRAM(s) Oral every 6 hours PRN Temp greater or equal to 38C (100.4F), Mild Pain (1 - 3)  dextrose Oral Gel 15 Gram(s) Oral once PRN Blood Glucose LESS THAN 70 milliGRAM(s)/deciliter      Diet, NPO:   Except Medications     Special Instructions for Nursing:  Except Medications (07-11-23 @ 21:32) [Active]          Vital Signs Last 24 Hrs  T(C): 36.6 (13 Jul 2023 07:57), Max: 37.3 (12 Jul 2023 11:47)  T(F): 97.9 (13 Jul 2023 07:57), Max: 99.1 (12 Jul 2023 11:47)  HR: 86 (13 Jul 2023 07:00) (77 - 96)  BP: 128/56 (13 Jul 2023 07:00) (81/53 - 173/110)  BP(mean): 81 (13 Jul 2023 07:00) (58 - 132)  RR: 22 (13 Jul 2023 07:00) (15 - 39)  SpO2: 97% (13 Jul 2023 07:00) (94% - 100%)    Parameters below as of 12 Jul 2023 08:00  Patient On (Oxygen Delivery Method): room air          07-12-23 @ 07:01  -  07-13-23 @ 07:00  --------------------------------------------------------  IN: 800 mL / OUT: 0 mL / NET: 800 mL              LABS:                        11.4   20.17 )-----------( 544      ( 12 Jul 2023 05:42 )             35.1     07-12    133<L>  |  93<L>  |  111<H>  ----------------------------<  222<H>  5.3   |  26  |  7.90<H>    Ca    10.3<H>      12 Jul 2023 05:42  Mg     3.1     07-11    TPro  7.7  /  Alb  2.8<L>  /  TBili  0.5  /  DBili  x   /  AST  32  /  ALT  21  /  AlkPhos  128<H>  07-12    PT/INR - ( 12 Jul 2023 05:42 )   PT: 14.6 sec;   INR: 1.25 ratio         PTT - ( 11 Jul 2023 17:09 )  PTT:21.3 sec  Urinalysis Basic - ( 12 Jul 2023 05:42 )    Color: x / Appearance: x / SG: x / pH: x  Gluc: 222 mg/dL / Ketone: x  / Bili: x / Urobili: x   Blood: x / Protein: x / Nitrite: x   Leuk Esterase: x / RBC: x / WBC x   Sq Epi: x / Non Sq Epi: x / Bacteria: x        ABG - ( 11 Jul 2023 19:48 )  pH, Arterial: 7.43  pH, Blood: x     /  pCO2: 34    /  pO2: 182   / HCO3: 23    / Base Excess: -1.7  /  SaO2: 100.0               WBC:  WBC Count: 20.17 K/uL (07-12 @ 05:42)  WBC Count: 26.51 K/uL (07-11 @ 19:12)  WBC Count: 19.09 K/uL (07-11 @ 08:33)  WBC Count: 19.81 K/uL (07-10 @ 09:00)      MICROBIOLOGY:  RECENT CULTURES:  07-10 .Blood Blood XXXX XXXX   No growth at 48 Hours    07-10 .Blood Blood XXXX XXXX   No growth at 48 Hours          CARDIAC MARKERS ( 11 Jul 2023 17:09 )  x     / x     / 439 U/L / x     / 12.7 ng/mL        PT/INR - ( 12 Jul 2023 05:42 )   PT: 14.6 sec;   INR: 1.25 ratio         PTT - ( 11 Jul 2023 17:09 )  PTT:21.3 sec    Sodium:  Sodium: 133 mmol/L (07-12 @ 05:42)  Sodium: 135 mmol/L (07-11 @ 17:09)  Sodium: 135 mmol/L (07-11 @ 08:33)  Sodium: 133 mmol/L (07-10 @ 09:00)      7.90 mg/dL 07-12 @ 05:42  6.60 mg/dL 07-11 @ 17:09  9.30 mg/dL 07-11 @ 08:33  8.10 mg/dL 07-10 @ 09:00      Hemoglobin:  Hemoglobin: 11.4 g/dL (07-12 @ 05:42)  Hemoglobin: 11.2 g/dL (07-11 @ 19:12)  Hemoglobin: 12.4 g/dL (07-11 @ 08:33)  Hemoglobin: 11.5 g/dL (07-10 @ 09:00)      Platelets: Platelet Count - Automated: 544 K/uL (07-12 @ 05:42)  Platelet Count - Automated: 520 K/uL (07-11 @ 19:12)  Platelet Count - Automated: 490 K/uL (07-11 @ 08:33)  Platelet Count - Automated: 465 K/uL (07-10 @ 09:00)      LIVER FUNCTIONS - ( 12 Jul 2023 05:42 )  Alb: 2.8 g/dL / Pro: 7.7 g/dL / ALK PHOS: 128 U/L / ALT: 21 U/L / AST: 32 U/L / GGT: x             Urinalysis Basic - ( 12 Jul 2023 05:42 )    Color: x / Appearance: x / SG: x / pH: x  Gluc: 222 mg/dL / Ketone: x  / Bili: x / Urobili: x   Blood: x / Protein: x / Nitrite: x   Leuk Esterase: x / RBC: x / WBC x   Sq Epi: x / Non Sq Epi: x / Bacteria: x        RADIOLOGY & ADDITIONAL STUDIES:      MICROBIOLOGY:  RECENT CULTURES:  07-10 .Blood Blood XXXX XXXX   No growth at 48 Hours    07-10 .Blood Blood XXXX XXXX   No growth at 48 Hours

## 2023-07-13 NOTE — SWALLOW BEDSIDE ASSESSMENT ADULT - COMMENTS
Chart reviewed new order received for swallow eval.  Pt s/p RRT, code stroke 7/11//23.  Pt received upright in bed A&A Ox1, reduced cognition, on RA, pain scale 0/10 pre & post eval.  Swallow eval completed see below for details.  Pt left as received NAD LEV Jimenez & ICU PA notified.  Will follow.     Per charting, pt is a "72 year old female with history of HTN, DM2, ESRD on HD, paroxysmal atrial fibrillation presented here following unwitnessed fall. Found to have right superior/inferior rami fracture and right comminuted acetabular fracture being managed medically. Course c/b acute encephalopathy and suspicion for a small left parietal intracerebral hemorrhage."    Chest xray 7/11/23: "Normal pulmonary vasculature, unchanged. Moderate left pleural effusion/adjacent atelectasis, unchanged"  MRI Head 7/12/23: "1. Ischemic white matter disease upper range typical for age 2. Diffuse brain volume loss typical for age 3. Right parietal remote petechial hemorrhage, inconspicuous by the CT technique 4. Small structure identified on CT 7/11/2023 within the left parietal lobe within cerebral cortex and subcortical white matter remains indeterminate, not clearly demonstrated by MR imaging.  No infarct or hemorrhage is recognized at this location. Abscess previously noted, the   finding is not evident on prior CT 7/1/2023. The differential diagnosis   remains between retained contrast within a vascular structure versus   interval development of calcification versus interval petechial hemorrhage" Chart reviewed new order received for swallow eval.  Pt s/p RRT, code stroke 7/11//23.  Pt received upright in bed A&A Ox1, reduced cognition consistent with pt's baseline per charting, on RA, pain scale 0/10 pre & post eval.  Swallow eval completed see below for details.  Pt left as received NAD LEV Jimenez & ICU PA notified.  Will follow.     Per charting, pt is a "72 year old female with history of HTN, DM2, ESRD on HD, paroxysmal atrial fibrillation presented here following unwitnessed fall. Found to have right superior/inferior rami fracture and right comminuted acetabular fracture being managed medically. Course c/b acute encephalopathy and suspicion for a small left parietal intracerebral hemorrhage."    Chest xray 7/11/23: "Normal pulmonary vasculature, unchanged. Moderate left pleural effusion/adjacent atelectasis, unchanged"  MRI Head 7/12/23: "1. Ischemic white matter disease upper range typical for age 2. Diffuse brain volume loss typical for age 3. Right parietal remote petechial hemorrhage, inconspicuous by the CT technique 4. Small structure identified on CT 7/11/2023 within the left parietal lobe within cerebral cortex and subcortical white matter remains indeterminate, not clearly demonstrated by MR imaging.  No infarct or hemorrhage is recognized at this location. Abscess previously noted, the   finding is not evident on prior CT 7/1/2023. The differential diagnosis   remains between retained contrast within a vascular structure versus   interval development of calcification versus interval petechial hemorrhage"

## 2023-07-13 NOTE — PROGRESS NOTE ADULT - SUBJECTIVE AND OBJECTIVE BOX
INTERVAL HPI/OVERNIGHT EVENTS: Patient was hypertensive overnight with a BPmax: 173/110.     SUBJECTIVE: Seen and examined pt at bedside. Has no acute complaints at this time.    Review of Systems:  Constitutional: No fever, fatigue  Neuro: No headache  Resp: No cough, shortness of breath  CVS: No chest pain  GI: No nausea, vomiting, diarrhea       ICU Vital Signs Last 24 Hrs  T(C): 36.6 (13 Jul 2023 07:57), Max: 37.3 (12 Jul 2023 11:47)  T(F): 97.9 (13 Jul 2023 07:57), Max: 99.1 (12 Jul 2023 11:47)  HR: 90 (13 Jul 2023 09:00) (77 - 96)  BP: 107/67 (13 Jul 2023 09:00) (81/53 - 173/110)  BP(mean): 81 (13 Jul 2023 09:00) (58 - 132)  ABP: --  ABP(mean): --  RR: 16 (13 Jul 2023 09:00) (15 - 39)  SpO2: 98% (13 Jul 2023 09:00) (94% - 98%)    O2 Parameters below as of 13 Jul 2023 08:00  Patient On (Oxygen Delivery Method): room air        07-12-23 @ 07:01  -  07-13-23 @ 07:00  --------------------------------------------------------  IN: 800 mL / OUT: 0 mL / NET: 800 mL        CAPILLARY BLOOD GLUCOSE      POCT Blood Glucose.: 76 mg/dL (13 Jul 2023 05:43)      I&O's Summary    12 Jul 2023 07:01  -  13 Jul 2023 07:00  --------------------------------------------------------  IN: 800 mL / OUT: 0 mL / NET: 800 mL      PHYSICAL EXAM:  Gen: NAD  Neuro: A&Ox2  HEENT: NCAT  Resp: CTA BL, no WRR  Cardio: +S1, S2, no MRG  Abd: soft and nontender to palpation  Ext: no lower extremity swelling  Skin: WWP      Meds:  ceftolozane/tazobactam IVPB IV Intermittent    diltiazem    Tablet Oral  metoprolol tartrate Oral  midodrine. Oral    dextrose 50% Injectable IV Push  dextrose 50% Injectable IV Push  dextrose 50% Injectable IV Push  dextrose Oral Gel Oral PRN  glucagon  Injectable IntraMuscular  insulin glargine Injectable (LANTUS) SubCutaneous  insulin lispro (ADMELOG) corrective regimen sliding scale SubCutaneous      acetaminophen     Tablet .. Oral PRN  dronabinol Oral  imipramine Oral  mirtazapine Oral      aspirin enteric coated Oral    pantoprazole    Tablet Oral  polyethylene glycol 3350 Oral  senna Oral      dextrose 5%. IV Continuous  dextrose 5%. IV Continuous      artificial  tears Solution Both EYES  chlorhexidine 2% Cloths Topical  lidocaine   4% Patch Transdermal  mupirocin 2% Nasal Both Nostrils    sodium zirconium cyclosilicate Oral                            11.9   18.67 )-----------( 451      ( 13 Jul 2023 08:22 )             37.0       07-13    130<L>  |  95<L>  |  125<H>  ----------------------------<  94  5.2   |  19<L>  |  8.60<H>    Ca    9.8      13 Jul 2023 08:22  Phos  8.2     07-13  Mg     2.6     07-13    TPro  7.1  /  Alb  2.3<L>  /  TBili  0.5  /  DBili  x   /  AST  30  /  ALT  18  /  AlkPhos  121<H>  07-13      CARDIAC MARKERS ( 11 Jul 2023 17:09 )  x     / x     / 439 U/L / x     / 12.7 ng/mL      PT/INR - ( 12 Jul 2023 05:42 )   PT: 14.6 sec;   INR: 1.25 ratio         PTT - ( 11 Jul 2023 17:09 )  PTT:21.3 sec  Urinalysis Basic - ( 13 Jul 2023 08:22 )    Color: x / Appearance: x / SG: x / pH: x  Gluc: 94 mg/dL / Ketone: x  / Bili: x / Urobili: x   Blood: x / Protein: x / Nitrite: x   Leuk Esterase: x / RBC: x / WBC x   Sq Epi: x / Non Sq Epi: x / Bacteria: x      .Blood Blood   No growth at 48 Hours -- 07-10 @ 16:25  .Blood Blood   No growth at 48 Hours -- 07-10 @ 16:20        Radiology:  IMPRESSION:    1. Ischemic white matter disease upper range typical for age    2. Diffuse brain volume loss typical for age    3. Right parietal remote petechial hemorrhage, inconspicuous by the CT   technique    4. Small structure identified on CT 7/11/2023 within the left parietal   lobe within cerebral cortex and subcortical white matter remains   indeterminate, not clearly demonstrated by MR imaging.  No infarct or   hemorrhage is recognized at this location. Abscess previously noted, the   finding is not evident on prior CT 7/1/2023. The differential diagnosis   remains between retained contrast within a vascular structure versus   interval development of calcification versus interval petechial hemorrhage      GLOBAL ISSUE/BEST PRACTICE:  Analgesia: Y  Sedation: N  HOB elevation: Y  Stress ulcer prophylaxis: Y  VTE prophylaxis: Y  Glycemic control: Y  Nutrition: Y, NPO    CODE STATUS: DNR/DNI       INTERVAL HPI/OVERNIGHT EVENTS: Patient was hypertensive overnight with a BPmax: 173/110.     SUBJECTIVE: Seen and examined pt at bedside. Has no acute complaints at this time.    Review of Systems:  Unable to fully assess due to poor mentation. Intermittently answering questions.       ICU Vital Signs Last 24 Hrs  T(C): 36.6 (13 Jul 2023 07:57), Max: 37.3 (12 Jul 2023 11:47)  T(F): 97.9 (13 Jul 2023 07:57), Max: 99.1 (12 Jul 2023 11:47)  HR: 90 (13 Jul 2023 09:00) (77 - 96)  BP: 107/67 (13 Jul 2023 09:00) (81/53 - 173/110)  BP(mean): 81 (13 Jul 2023 09:00) (58 - 132)  ABP: --  ABP(mean): --  RR: 16 (13 Jul 2023 09:00) (15 - 39)  SpO2: 98% (13 Jul 2023 09:00) (94% - 98%)    O2 Parameters below as of 13 Jul 2023 08:00  Patient On (Oxygen Delivery Method): room air        07-12-23 @ 07:01  -  07-13-23 @ 07:00  --------------------------------------------------------  IN: 800 mL / OUT: 0 mL / NET: 800 mL        CAPILLARY BLOOD GLUCOSE      POCT Blood Glucose.: 76 mg/dL (13 Jul 2023 05:43)      I&O's Summary    12 Jul 2023 07:01  -  13 Jul 2023 07:00  --------------------------------------------------------  IN: 800 mL / OUT: 0 mL / NET: 800 mL      PHYSICAL EXAM:  Gen: NAD  Neuro: A&Ox2  HEENT: NCAT  Resp: CTA BL, no WRR  Cardio: +S1, S2, no MRG  Abd: soft and nontender to palpation  Ext: no lower extremity swelling  Skin: WWP      Meds:  ceftolozane/tazobactam IVPB IV Intermittent    diltiazem    Tablet Oral  metoprolol tartrate Oral  midodrine. Oral    dextrose 50% Injectable IV Push  dextrose 50% Injectable IV Push  dextrose 50% Injectable IV Push  dextrose Oral Gel Oral PRN  glucagon  Injectable IntraMuscular  insulin glargine Injectable (LANTUS) SubCutaneous  insulin lispro (ADMELOG) corrective regimen sliding scale SubCutaneous      acetaminophen     Tablet .. Oral PRN  dronabinol Oral  imipramine Oral  mirtazapine Oral      aspirin enteric coated Oral    pantoprazole    Tablet Oral  polyethylene glycol 3350 Oral  senna Oral      dextrose 5%. IV Continuous  dextrose 5%. IV Continuous      artificial  tears Solution Both EYES  chlorhexidine 2% Cloths Topical  lidocaine   4% Patch Transdermal  mupirocin 2% Nasal Both Nostrils    sodium zirconium cyclosilicate Oral                            11.9   18.67 )-----------( 451      ( 13 Jul 2023 08:22 )             37.0       07-13    130<L>  |  95<L>  |  125<H>  ----------------------------<  94  5.2   |  19<L>  |  8.60<H>    Ca    9.8      13 Jul 2023 08:22  Phos  8.2     07-13  Mg     2.6     07-13    TPro  7.1  /  Alb  2.3<L>  /  TBili  0.5  /  DBili  x   /  AST  30  /  ALT  18  /  AlkPhos  121<H>  07-13      CARDIAC MARKERS ( 11 Jul 2023 17:09 )  x     / x     / 439 U/L / x     / 12.7 ng/mL      PT/INR - ( 12 Jul 2023 05:42 )   PT: 14.6 sec;   INR: 1.25 ratio         PTT - ( 11 Jul 2023 17:09 )  PTT:21.3 sec  Urinalysis Basic - ( 13 Jul 2023 08:22 )    Color: x / Appearance: x / SG: x / pH: x  Gluc: 94 mg/dL / Ketone: x  / Bili: x / Urobili: x   Blood: x / Protein: x / Nitrite: x   Leuk Esterase: x / RBC: x / WBC x   Sq Epi: x / Non Sq Epi: x / Bacteria: x      .Blood Blood   No growth at 48 Hours -- 07-10 @ 16:25  .Blood Blood   No growth at 48 Hours -- 07-10 @ 16:20        Radiology:  IMPRESSION:    1. Ischemic white matter disease upper range typical for age    2. Diffuse brain volume loss typical for age    3. Right parietal remote petechial hemorrhage, inconspicuous by the CT   technique    4. Small structure identified on CT 7/11/2023 within the left parietal   lobe within cerebral cortex and subcortical white matter remains   indeterminate, not clearly demonstrated by MR imaging.  No infarct or   hemorrhage is recognized at this location. Abscess previously noted, the   finding is not evident on prior CT 7/1/2023. The differential diagnosis   remains between retained contrast within a vascular structure versus   interval development of calcification versus interval petechial hemorrhage      GLOBAL ISSUE/BEST PRACTICE:  Analgesia: Y  Sedation: N  HOB elevation: Y  Stress ulcer prophylaxis: Y  VTE prophylaxis: Y  Glycemic control: Y  Nutrition: Y, NPO    CODE STATUS: DNR/DNI

## 2023-07-13 NOTE — SWALLOW BEDSIDE ASSESSMENT ADULT - SWALLOW EVAL: RECOMMENDED FEEDING/EATING TECHNIQUES
allow for swallow between intakes/check mouth frequently for oral residue/pocketing/crush medication (when feasible)/maintain upright posture during/after eating for 30 mins/no straws/oral hygiene/position upright (90 degrees)/small sips/bites
allow for swallow between intakes/check mouth frequently for oral residue/pocketing/crush medication (when feasible)/maintain upright posture during/after eating for 30 mins/oral hygiene/position upright (90 degrees)/small sips/bites

## 2023-07-13 NOTE — PROGRESS NOTE ADULT - ASSESSMENT
Patient is 73F with a PMH of HTN, DM, dementia, ESRD on HD who presented with suspicion for a brain bleed due to CT head demonstrating an area of potential hemorrhage and was admitted to the ICU for intensive BP monitoring.    Neuro:  -r/o brain hemorrhage  -f/u results of MRI to r/o brain bleed  -Q4 neuro checks  -continue aspirin per neurology    CV:  -HTN  -continue anti-hypertensive medications: clonidine metoprolol and diltiazem    Pulm:  -stable condition    GI:  -NPO diet  -Swallow evaluation ordered    Renal:  -ESRD on HD  -HD per renal  -Lokelma for tx of hyperkalemia    ID:  -Carbepenem resistant Pesudonomas/Klebsiella ESBL growth in UC  -Continue Zebraxa  -+MRSA PCR, started on Bactroban  -f/u ID recs    Heme:  -SCDs for DVT ppx    Endo:  -DM  -Continue Lantus and ISS  -Continue to monitor glucose levels    Patient to be transferred to MED floor.

## 2023-07-13 NOTE — PROGRESS NOTE ADULT - ASSESSMENT
REASON FOR VISIT  .. Management of problems listed below      NOTEWORTHY FINDINGS.     PHYSICAL EXAM    HEENT Unremarkable  atraumatic   RESP Fair air entry  Harsh breath sound   CARDIAC S1 S2 No S3     NO JVD    ABDOMEN No hepatosplenomegaly   PEDAL EDEMA present No calf tenderness  NO rash       GENERAL DATA .     GOC.    . prior  ICU STAY.   .. 7/11/2023   COVID.   ..    BEST PRACTICE ISSUES.    HOB ELEVATN.   .. Yes  DVT PPLX.   .. 7/10 apixa held for thora  .. 7/3/2023 apixa 2.5 x2 restarted as IR feels we should tap fluid only if she develops shortness of breath  ..    7/2/2023 Apixaba 2.5 x2 held for thorac   STRESS ULCER PPLX.   ..      INFECTION PPLX.   ..  7/11 chlorhexidine 2%  SP SW AMINAH.    ..      7/13/2023 puree thin  DIET.    .. 7/11 npo   ..  7/4/2023 mince moist dc ed   IV fl.  ..     PROCEDURES.  ..   PAST PROCEDURES.    ..   GENERAL DATA .   ALLGY.  ..     latex                     WT.  ..  7/1/2023 54  BMI.  ..    7/1/2023 17     ABGS.  . 7/11/2023 7p 3l 743/34/182     VS/ PO/IO/ VENT/ DRIPS  7/13/2023 afeb 92 150/70   7/13/2023 ra 97%      CC/DOA.        . 7/1/2023 Pt sent from [facility] for unwitnessed fall out of bed.       .  PRESENTATION.   . 7/1/2023 72-year-old female former smoker quit 3/2012  with PMH for hypertension, CAD sp cabg  PVD   sp R-> L bifem - fem BK pop bypass  Fem pop bypass 6/21/2022  Partial ray amputation r great toe 6/22/2022  diabetes, dementia, ESRD on hemodialysis   a fib on eliquis sp recent hosp stay 5/17-5/24/2023     . ENTERORHINOVIRUS INFECTION RESP TRACT 5/17  . PLEURAL EFFUSION SP l THORA 5/18 TRANSUDATE     COURSE   . ACETABULAR Fx Ortho recommended non surgical mgmt  . PL EFFSN Was initially decided to hold off thora unless she develops sob  . LEUKOCYTOSIS 7/11/2023 dw ID will plan thorac to exclude infecn  . ALTERED MENTAL STATE 7/11 Tr to icu poss ic bleed     PROBLEMS/ASSESSMENT/RECOMMENDATIONS (A/R).  PULMONARY.  . GAS EXCHANGE.  .. A/R.   .. Monitor pulse ox and target po 90-95%    . PLEURAL EFFSN.  .. RELEVANT PAST HISTORU  .. 5/18/2023 l thorac 1.5 l   .. 5/18 pl fl l 13 m 73 p 5 afb sm (-) g 131 l 102/268 (.38) ph 7.6 pr 2.6/6.4 (.4)  .. Fluid transudate  .. WORKUP   .. CXR 7/1/2023 cxr Mod large l pl effs or lower zone airspace consolidation/atelecatsis   .. CT ch 7/1/2023 Ct ch   .... Decreased mod l effs since 5/17/2023   .. EVENTS  .. 7/2/2023 DW pt and dw son consensus is that they want fluid remived   .. 7/3/2023 dw ir plan changed plan is to tap if she becomes symptimatic   .. 7/3/2023 Thora cancelled as pt is comfortable will pan thora if she becomes symptomatic   .. 7/10/2023 as pt has persistent leukocytosis if no impovement will need thora so apixa 2.5 bid staoppd   .. A/R.  . 7/11/2023  thora diagn orderd    ID.  . ESBL UTI.7/3  .. WORKUP.  .. W 7/1-7/2-7/3-7/6-7/7-7/9-7/10-7/11-7/12-7/13/2023 W 15 - 14 - 13- 13 - 15- 18 - 19 - 19 - 20- 18    .. EVENTS.   .. 7/1/2023 Checlk blood and urine cs   .. MICRO.  .. uc 7/3/2023 100 k pseudomonas carbapenem resist    ..  7/3/2023 50-99 Klebs esbl   .. bc 7/10 (-)   .. mrsa 7/12 (+)   .. ABIO.  .. 7/8/2023 meropenem 500 x 3d Dr ALCAZAR dced   .. 7/11 ceftolozane tazobactam 150.3 zerbaxa x 2 do     . PERSISTENT LEUKOCYTOSIS.  .. w 7/11/2023 w 19  .. ct hip r 7/11/2023   .... no hematoma or fluid collectn   .... r acetabular fc 2.1 x 3.4 cm cortical stepoff   .. EVENTS.  .. 7/11/2023 dw id  will plan thorac   .. 7/11 ceftolozane tazobactam 150.3 zerbaxa x 2 do   .. A/R.  .. 7/12/2023 pt now in icu sec possible ic bleed   .. 7/12/2023 Thoracentesis scheduling will be decided by icu     CARDIO.  . HEMODYNAMICS.  .. 7/8/2023 midodrine 10.3 Dr CLIFFORD   .. target map 65 (+)     . CAD.  .. 7/1/2023 ASA 81   .. A/R  .. cont rx    . A fib.  .. 7/1/2023 CARDIZEM 60.3   .. 7/3/2023 apixaba 2.5 x2   .. 7/10 apixa held  .. AR  .. Cont rx    HEMAT.  .. WORKUP.  .. Hb 7/1-7/2-7/3-7/6-7/9-7/11-7/12/2023 Hb 13 - 11.8 - 11.2 - 11- 12 -12.4- 11.4    .. INR 7/1/2023    .. A/R  .. As pt was on apixaba will monitorserially     RENAL.  . ESRD.  .. WORKUP  .. NA 7/1/2023    .. K 7/1/2023 K 5.1   .. CR 7/1/2023 CR 5  .. HD dependent     ORTHO.  . FALL 7/1/2023.  .. CT head C sp 7/1/2023 CT HC (-)     . FRACTURE ACETABULUM r INFERIOR PUBIC RAMUS r 7/1/2023   .. IMAGING.  .. XR Pelvis and r hip 7/1/2023 XR Fractures r acetabulum and inf r pubic ramus   .. 7/1/2023 ct pelvix   .... comminuted r acetabular fx involving r sup and inf pubic rami medial wallacetabulum sup acetabulum and post wall acetabulum   .... small hematoma r pelvic sidewall   .. ORTHO.  .. 7/1/2023 DW Dr Dias She spoke to Ortho Dr Jenkins group and plan is for nonsurgical management  .. A/R  .. Monitor serial Hb ro bleed     . IC BLEED 7/11.  .. ct head 7/11 cw cta h 7/11   .... 5 mm hyperattenuating curvilinear focus l parietal lobe may be sl increasd from 3 mm on 7/11 520p may represent small focus of hrrge v contrast training in ac infacrt  .. may need transfer tertiary center    .. mr brain no contr 7/12   .... r parietal remote petechial hrrge   .... l parietal petechial hrrge no excluded   .. Follow with neuro     . ALTERED MENTAL STATE 7/11  .. 7/11/2023  Tr to icu poss ic bleed for q h neurochecl     . MAIN ISSUES  .. FX acetabulum on conservative rx  . A fib on doac  . ESRD   . UTI 7/8/2023 started on meropenem by ID (UTI)   . 7/10/2023 If leukocytosis persists will plan thorac apixa stoppd   . LEUKOCYTOSIS 7/11/2023 dw ID will plan thorac to exclude infecn  7/11 ceftolozane/tazobactam 150.3 x 2 do   . ALTERED MENTAL STATE 7/11 Tr to icu poss ic bleed tr to tertiary being considered     TIME SPENT   . About 36 minutes aggregate care time spent on encounter; activities included   direct patient care, counseling and/or coordinating care reviewing notes, lab data/ imaging , discussion with multidisciplinary team/ patient  /family and explaining in detail risks, benefits, alternatives  of the recommendations     JOSE F LAO 72 f7/1/2023 1948 DR HARRY ALBRIGHT

## 2023-07-13 NOTE — PROGRESS NOTE ADULT - ASSESSMENT
fx rt pelvis with hematoma- hold eliquis  s/p fall  ashd - s/p cabg  s/p mi  s/p stent  esrd - on hd  dm2  hypertension  paf  depression  pvd  discussed with spouse 7/1  repeat xray chest follow up effusion 7/11  hypotension - trnasferred to icu- bp improved 7/13

## 2023-07-13 NOTE — PROGRESS NOTE ADULT - ATTENDING COMMENTS
72 year old female with history of HTN, DM2, ESRD on HD, paroxysmal atrial fibrillation presented here following unwitnessed fall. Found to have right superior/inferior rami fracture and right comminuted acetabular fracture being managed medically. Course c/b acute encephalopathy and suspicion for a small left parietal intracerebral hemorrhage.     NEURO: Acute metabolic encephalopathy due to sepsis / UTI. Possible bleed. MRI noted. Neuro follow up. Continue ASA.   CVS: Titrate down on midodrine for hypotension. Rate control with metoprolol and diltiazem. Restart Eliquis.  PULM: Small left pleural effusion on bedside US. Saturating well on 2 L NC.   GI: NPO except for meds.  RENAL: HD as per nephrology. Lokelma.  ENDO: ISS  ID: Continue Zerbaxa for suspected UTI. ID follow up. Wound care consult for stage 4 sacral ulcer noted.  HEME: Restart therapeutic AC  DNR/DNI  Eligible for transfer to medicine

## 2023-07-14 LAB
ANION GAP SERPL CALC-SCNC: 14 MMOL/L — SIGNIFICANT CHANGE UP (ref 5–17)
BUN SERPL-MCNC: 80 MG/DL — HIGH (ref 7–23)
CALCIUM SERPL-MCNC: 9.8 MG/DL — SIGNIFICANT CHANGE UP (ref 8.5–10.1)
CHLORIDE SERPL-SCNC: 94 MMOL/L — LOW (ref 96–108)
CO2 SERPL-SCNC: 26 MMOL/L — SIGNIFICANT CHANGE UP (ref 22–31)
CREAT SERPL-MCNC: 7 MG/DL — HIGH (ref 0.5–1.3)
EGFR: 6 ML/MIN/1.73M2 — LOW
GLUCOSE SERPL-MCNC: 236 MG/DL — HIGH (ref 70–99)
HCT VFR BLD CALC: 35.4 % — SIGNIFICANT CHANGE UP (ref 34.5–45)
HGB BLD-MCNC: 11.2 G/DL — LOW (ref 11.5–15.5)
MAGNESIUM SERPL-MCNC: 2.7 MG/DL — HIGH (ref 1.6–2.6)
MCHC RBC-ENTMCNC: 31.1 PG — SIGNIFICANT CHANGE UP (ref 27–34)
MCHC RBC-ENTMCNC: 31.6 GM/DL — LOW (ref 32–36)
MCV RBC AUTO: 98.3 FL — SIGNIFICANT CHANGE UP (ref 80–100)
NRBC # BLD: 0 /100 WBCS — SIGNIFICANT CHANGE UP (ref 0–0)
PHOSPHATE SERPL-MCNC: 7.4 MG/DL — HIGH (ref 2.5–4.5)
PLATELET # BLD AUTO: 519 K/UL — HIGH (ref 150–400)
POTASSIUM SERPL-MCNC: 5.3 MMOL/L — SIGNIFICANT CHANGE UP (ref 3.5–5.3)
POTASSIUM SERPL-SCNC: 5.3 MMOL/L — SIGNIFICANT CHANGE UP (ref 3.5–5.3)
RBC # BLD: 3.6 M/UL — LOW (ref 3.8–5.2)
RBC # FLD: 15.8 % — HIGH (ref 10.3–14.5)
SODIUM SERPL-SCNC: 134 MMOL/L — LOW (ref 135–145)
WBC # BLD: 15.59 K/UL — HIGH (ref 3.8–10.5)
WBC # FLD AUTO: 15.59 K/UL — HIGH (ref 3.8–10.5)

## 2023-07-14 PROCEDURE — 99232 SBSQ HOSP IP/OBS MODERATE 35: CPT

## 2023-07-14 PROCEDURE — 99233 SBSQ HOSP IP/OBS HIGH 50: CPT

## 2023-07-14 RX ORDER — ASPIRIN/CALCIUM CARB/MAGNESIUM 324 MG
81 TABLET ORAL DAILY
Refills: 0 | Status: DISCONTINUED | OUTPATIENT
Start: 2023-07-14 | End: 2023-07-17

## 2023-07-14 RX ADMIN — SENNA PLUS 2 TABLET(S): 8.6 TABLET ORAL at 23:38

## 2023-07-14 RX ADMIN — Medication 81 MILLIGRAM(S): at 13:24

## 2023-07-14 RX ADMIN — Medication 60 MILLIGRAM(S): at 05:40

## 2023-07-14 RX ADMIN — Medication 60 MILLIGRAM(S): at 23:37

## 2023-07-14 RX ADMIN — Medication 50 MILLIGRAM(S): at 13:24

## 2023-07-14 RX ADMIN — CEFTOLOZANE AND TAZOBACTAM 100 MILLIGRAM(S): 1; .5 INJECTION, POWDER, LYOPHILIZED, FOR SOLUTION INTRAVENOUS at 23:36

## 2023-07-14 RX ADMIN — PANTOPRAZOLE SODIUM 40 MILLIGRAM(S): 20 TABLET, DELAYED RELEASE ORAL at 05:44

## 2023-07-14 RX ADMIN — LIDOCAINE 1 PATCH: 4 CREAM TOPICAL at 11:21

## 2023-07-14 RX ADMIN — Medication 100 MILLIGRAM(S): at 05:40

## 2023-07-14 RX ADMIN — Medication 1 DROP(S): at 17:10

## 2023-07-14 RX ADMIN — Medication 2: at 17:09

## 2023-07-14 RX ADMIN — Medication 1 DROP(S): at 05:40

## 2023-07-14 RX ADMIN — LIDOCAINE 1 PATCH: 4 CREAM TOPICAL at 19:21

## 2023-07-14 RX ADMIN — INSULIN GLARGINE 13 UNIT(S): 100 INJECTION, SOLUTION SUBCUTANEOUS at 23:37

## 2023-07-14 RX ADMIN — Medication 2.5 MILLIGRAM(S): at 05:47

## 2023-07-14 RX ADMIN — Medication 100 MILLIGRAM(S): at 17:09

## 2023-07-14 RX ADMIN — MIDODRINE HYDROCHLORIDE 10 MILLIGRAM(S): 2.5 TABLET ORAL at 05:40

## 2023-07-14 RX ADMIN — MUPIROCIN 1 APPLICATION(S): 20 OINTMENT TOPICAL at 17:10

## 2023-07-14 RX ADMIN — MIRTAZAPINE 7.5 MILLIGRAM(S): 45 TABLET, ORALLY DISINTEGRATING ORAL at 23:53

## 2023-07-14 RX ADMIN — MIDODRINE HYDROCHLORIDE 10 MILLIGRAM(S): 2.5 TABLET ORAL at 13:25

## 2023-07-14 RX ADMIN — CEFTOLOZANE AND TAZOBACTAM 100 MILLIGRAM(S): 1; .5 INJECTION, POWDER, LYOPHILIZED, FOR SOLUTION INTRAVENOUS at 13:24

## 2023-07-14 RX ADMIN — MIDODRINE HYDROCHLORIDE 10 MILLIGRAM(S): 2.5 TABLET ORAL at 17:09

## 2023-07-14 RX ADMIN — Medication 2: at 07:57

## 2023-07-14 RX ADMIN — CEFTOLOZANE AND TAZOBACTAM 100 MILLIGRAM(S): 1; .5 INJECTION, POWDER, LYOPHILIZED, FOR SOLUTION INTRAVENOUS at 06:22

## 2023-07-14 RX ADMIN — LIDOCAINE 1 PATCH: 4 CREAM TOPICAL at 00:21

## 2023-07-14 RX ADMIN — Medication 2.5 MILLIGRAM(S): at 17:09

## 2023-07-14 RX ADMIN — POLYETHYLENE GLYCOL 3350 17 GRAM(S): 17 POWDER, FOR SOLUTION ORAL at 13:23

## 2023-07-14 RX ADMIN — Medication 60 MILLIGRAM(S): at 13:24

## 2023-07-14 RX ADMIN — Medication 2: at 11:58

## 2023-07-14 RX ADMIN — SODIUM ZIRCONIUM CYCLOSILICATE 5 GRAM(S): 10 POWDER, FOR SUSPENSION ORAL at 11:21

## 2023-07-14 RX ADMIN — MUPIROCIN 1 APPLICATION(S): 20 OINTMENT TOPICAL at 05:40

## 2023-07-14 NOTE — PROGRESS NOTE ADULT - SUBJECTIVE AND OBJECTIVE BOX
Date of Service: 07-14-23 @ 11:26    Patient is a 72y old  Female who presents with a chief complaint of s/p fall (14 Jul 2023 09:08)      INTERVAL HPI/OVERNIGHT EVENTS: Patient seen and examined. NAD. No complaints.    Vital Signs Last 24 Hrs  T(C): 36.7 (14 Jul 2023 05:46), Max: 37.2 (13 Jul 2023 20:29)  T(F): 98.1 (14 Jul 2023 05:46), Max: 99 (13 Jul 2023 20:29)  HR: 81 (14 Jul 2023 05:46) (75 - 107)  BP: 129/87 (14 Jul 2023 05:46) (95/59 - 167/76)  BP(mean): 80 (13 Jul 2023 16:00) (71 - 105)  RR: 18 (14 Jul 2023 05:46) (18 - 26)  SpO2: 99% (14 Jul 2023 05:46) (90% - 99%)    Parameters below as of 14 Jul 2023 00:30  Patient On (Oxygen Delivery Method): room air        07-13    130<L>  |  95<L>  |  125<H>  ----------------------------<  94  5.2   |  19<L>  |  8.60<H>    Ca    9.8      13 Jul 2023 08:22  Phos  8.2     07-13  Mg     2.6     07-13    TPro  7.1  /  Alb  2.3<L>  /  TBili  0.5  /  DBili  x   /  AST  30  /  ALT  18  /  AlkPhos  121<H>  07-13                          11.9   18.67 )-----------( 451      ( 13 Jul 2023 08:22 )             37.0       CAPILLARY BLOOD GLUCOSE      POCT Blood Glucose.: 206 mg/dL (14 Jul 2023 07:40)  POCT Blood Glucose.: 211 mg/dL (13 Jul 2023 21:41)  POCT Blood Glucose.: 218 mg/dL (13 Jul 2023 17:12)  POCT Blood Glucose.: 96 mg/dL (13 Jul 2023 11:38)    Urinalysis Basic - ( 13 Jul 2023 08:22 )    Color: x / Appearance: x / SG: x / pH: x  Gluc: 94 mg/dL / Ketone: x  / Bili: x / Urobili: x   Blood: x / Protein: x / Nitrite: x   Leuk Esterase: x / RBC: x / WBC x   Sq Epi: x / Non Sq Epi: x / Bacteria: x              acetaminophen     Tablet .. 650 milliGRAM(s) Oral every 6 hours PRN  albumin human 25% IVPB 50 milliLiter(s) IV Intermittent <User Schedule>  artificial  tears Solution 1 Drop(s) Both EYES every 12 hours  aspirin  chewable 81 milliGRAM(s) Oral daily  ceftolozane/tazobactam IVPB 150 milliGRAM(s) IV Intermittent every 8 hours  dextrose 5%. 1000 milliLiter(s) IV Continuous <Continuous>  dextrose 5%. 1000 milliLiter(s) IV Continuous <Continuous>  dextrose 50% Injectable 12.5 Gram(s) IV Push once  dextrose 50% Injectable 25 Gram(s) IV Push once  dextrose 50% Injectable 25 Gram(s) IV Push once  dextrose Oral Gel 15 Gram(s) Oral once PRN  diltiazem    Tablet 60 milliGRAM(s) Oral three times a day  dronabinol 2.5 milliGRAM(s) Oral two times a day before meals  glucagon  Injectable 1 milliGRAM(s) IntraMuscular once  imipramine 50 milliGRAM(s) Oral daily  insulin glargine Injectable (LANTUS) 13 Unit(s) SubCutaneous at bedtime  insulin lispro (ADMELOG) corrective regimen sliding scale   SubCutaneous three times a day before meals  insulin lispro (ADMELOG) corrective regimen sliding scale   SubCutaneous at bedtime  lidocaine   4% Patch 1 Patch Transdermal daily  metoprolol tartrate 100 milliGRAM(s) Oral two times a day  midodrine. 10 milliGRAM(s) Oral three times a day  mirtazapine 7.5 milliGRAM(s) Oral at bedtime  mupirocin 2% Nasal 1 Application(s) Both Nostrils two times a day  pantoprazole    Tablet 40 milliGRAM(s) Oral before breakfast  polyethylene glycol 3350 17 Gram(s) Oral daily  senna 2 Tablet(s) Oral at bedtime  sodium zirconium cyclosilicate 5 Gram(s) Oral daily              REVIEW OF SYSTEMS:  CONSTITUTIONAL: No fever, no weight loss, or no fatigue  NECK: No pain, no stiffness  RESPIRATORY: No cough, no wheezing, no chills, no hemoptysis, No shortness of breath  CARDIOVASCULAR: No chest pain, no palpitations, no dizziness, no leg swelling  GASTROINTESTINAL: No abdominal pain. No nausea, no vomiting, no hematemesis; No diarrhea, no constipation. No melena, no hematochezia.  GENITOURINARY: No dysuria, no frequency, no hematuria, no incontinence  NEUROLOGICAL: No headaches, no loss of strength, no numbness, no tremors  SKIN: No itching, no burning  MUSCULOSKELETAL: No joint pain, no swelling; No muscle, no back, no extremity pain  PSYCHIATRIC: No depression, no mood swings,   HEME/LYMPH: No easy bruising, no bleeding gums  ALLERY AND IMMUNOLOGIC: No hives       Consultant(s) Notes Reviewed:  [X] YES  [ ] NO    PHYSICAL EXAM:  GENERAL: NAD  HEAD:  Atraumatic, Normocephalic  EYES: EOMI, PERRLA, conjunctiva and sclera clear  ENMT: No tonsillar erythema, exudates, or enlargement; Moist mucous membranes  NECK: Supple, No JVD  NERVOUS SYSTEM:  Awake & alert  CHEST/LUNG: Clear to auscultation bilaterally; No rales, rhonchi, wheezing,  HEART: Regular rate and rhythm  ABDOMEN: Soft, Nontender, Nondistended; Bowel sounds present  EXTREMITIES:  No clubbing, cyanosis, or edema  LYMPH: No lymphadenopathy noted  SKIN: No rashes      Advanced care planning discussed with patient/family [X] YES   [ ] NO    Advanced care planning discussed with patient/family. Patient's health status was discussed. All appropriate changes have been made regarding patient's end-of-life care. Advanced care planning forms reviewed/discussed/completed.  20 minutes spent.

## 2023-07-14 NOTE — PROGRESS NOTE ADULT - SUBJECTIVE AND OBJECTIVE BOX
CHIEF COMPLAINT/ REASON FOR VISIT  .. Patient was seen to address the  issue listed under PROBLEM LIST which is located toward bottom of this note     SHILO KOHLER    [facility] [address]    Allergies    No Known Drug Allergies  latex (Hives)    Intolerances        PAST MEDICAL & SURGICAL HISTORY:  HTN (hypertension)      h/o Anxiety attack      Depression      h/o Myocardial infarct 2007      h/o Hepatitis A 1969  currently resolved, no symptoms      Murmur, cardiac      h/o Smoking  quitted 3/2012      Anemia secondary to renal failure      ESRD on dialysis      Falls      Ataxia      Type 2 diabetes mellitus      Peripheral vascular disease, unspecified      CAD (coronary artery disease)      Diabetes      coronary stent 2007      s/p Ovarian cyst removal      s/p surgical removal of benign Skin lesion epigastric area      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          FAMILY HISTORY:  FH: myocardial infarction (Father)    FH: myocardial infarction (Father)    Family history of type 2 diabetes mellitus in brother (Sibling)        Home Medications:  acetaminophen 325 mg oral tablet: 2 tab(s) orally every 6 hours as needed (02 Jul 2023 15:24)  Admelog SoloStar 100 units/mL injectable solution: injectable 3 times a day (before meals)sliding scale  (02 Jul 2023 15:24)  apixaban 2.5 mg oral tablet: 1 tab(s) orally 2 times a day (02 Jul 2023 15:24)  aspirin 81 mg oral delayed release tablet: 1 tab(s) orally once a day (at bedtime) (02 Jul 2023 15:24)  atorvastatin 20 mg oral tablet: 1 tab(s) orally once a day (at bedtime) (02 Jul 2023 15:24)  bacitracin 500 units/g topical ointment: Apply topically to affected area once a day to right knee abrasion (02 Jul 2023 11:54)  Basaglar KwikPen 100 units/mL subcutaneous solution: 8 international unit(s) subcutaneous once a day (at bedtime) (02 Jul 2023 15:24)  cloNIDine 0.1 mg oral tablet: 1 tab(s) orally 2 times a day (02 Jul 2023 15:24)  DilTIAZem (Eqv-Cardizem CD) 180 mg/24 hours oral capsule, extended release: 1 cap(s) orally once a day (02 Jul 2023 15:24)  imipramine 50 mg oral tablet: 1 tab(s) orally once a day (in the evening) (02 Jul 2023 15:24)  metoprolol tartrate 100 mg oral tablet: 1 tab(s) orally 2 times a day (02 Jul 2023 15:24)  mirtazapine 7.5 mg oral tablet: 1 tab(s) orally once a day (at bedtime) (02 Jul 2023 15:24)  Nephro-Alfie oral tablet: 1 tab(s) orally once a day (02 Jul 2023 15:24)  PANTOPRAZOLE 40MG DR TAB: 1 tab(s) orally once a day (02 Jul 2023 15:24)  senna leaf extract oral tablet: 2 tab(s) orally once a day (at bedtime) (02 Jul 2023 15:24)      MEDICATIONS  (STANDING):  albumin human 25% IVPB 50 milliLiter(s) IV Intermittent <User Schedule>  artificial  tears Solution 1 Drop(s) Both EYES every 12 hours  aspirin enteric coated 81 milliGRAM(s) Oral daily  ceftolozane/tazobactam IVPB 150 milliGRAM(s) IV Intermittent every 8 hours  dextrose 5%. 1000 milliLiter(s) (50 mL/Hr) IV Continuous <Continuous>  dextrose 5%. 1000 milliLiter(s) (100 mL/Hr) IV Continuous <Continuous>  dextrose 50% Injectable 12.5 Gram(s) IV Push once  dextrose 50% Injectable 25 Gram(s) IV Push once  dextrose 50% Injectable 25 Gram(s) IV Push once  diltiazem    Tablet 60 milliGRAM(s) Oral three times a day  dronabinol 2.5 milliGRAM(s) Oral two times a day before meals  glucagon  Injectable 1 milliGRAM(s) IntraMuscular once  imipramine 50 milliGRAM(s) Oral daily  insulin glargine Injectable (LANTUS) 13 Unit(s) SubCutaneous at bedtime  insulin lispro (ADMELOG) corrective regimen sliding scale   SubCutaneous three times a day before meals  insulin lispro (ADMELOG) corrective regimen sliding scale   SubCutaneous at bedtime  lidocaine   4% Patch 1 Patch Transdermal daily  metoprolol tartrate 100 milliGRAM(s) Oral two times a day  midodrine. 10 milliGRAM(s) Oral three times a day  mirtazapine 7.5 milliGRAM(s) Oral at bedtime  mupirocin 2% Nasal 1 Application(s) Both Nostrils two times a day  pantoprazole    Tablet 40 milliGRAM(s) Oral before breakfast  polyethylene glycol 3350 17 Gram(s) Oral daily  senna 2 Tablet(s) Oral at bedtime  sodium zirconium cyclosilicate 5 Gram(s) Oral daily    MEDICATIONS  (PRN):  acetaminophen     Tablet .. 650 milliGRAM(s) Oral every 6 hours PRN Temp greater or equal to 38C (100.4F), Mild Pain (1 - 3)  dextrose Oral Gel 15 Gram(s) Oral once PRN Blood Glucose LESS THAN 70 milliGRAM(s)/deciliter      Diet, Pureed:   Moderately Thick Liquids (MODTHICKLIQS) (07-13-23 @ 15:22) [Active]          Vital Signs Last 24 Hrs  T(C): 36.7 (14 Jul 2023 05:46), Max: 37.2 (13 Jul 2023 20:29)  T(F): 98.1 (14 Jul 2023 05:46), Max: 99 (13 Jul 2023 20:29)  HR: 81 (14 Jul 2023 05:46) (75 - 108)  BP: 129/87 (14 Jul 2023 05:46) (87/42 - 167/76)  BP(mean): 80 (13 Jul 2023 16:00) (60 - 105)  RR: 18 (14 Jul 2023 05:46) (16 - 30)  SpO2: 99% (14 Jul 2023 05:46) (90% - 99%)    Parameters below as of 14 Jul 2023 00:30  Patient On (Oxygen Delivery Method): room air          07-12-23 @ 07:01  -  07-13-23 @ 07:00  --------------------------------------------------------  IN: 800 mL / OUT: 0 mL / NET: 800 mL    07-13-23 @ 07:01  -  07-14-23 @ 05:56  --------------------------------------------------------  IN: 100 mL / OUT: 500 mL / NET: -400 mL              LABS:                        11.9   18.67 )-----------( 451      ( 13 Jul 2023 08:22 )             37.0     07-13    130<L>  |  95<L>  |  125<H>  ----------------------------<  94  5.2   |  19<L>  |  8.60<H>    Ca    9.8      13 Jul 2023 08:22  Phos  8.2     07-13  Mg     2.6     07-13    TPro  7.1  /  Alb  2.3<L>  /  TBili  0.5  /  DBili  x   /  AST  30  /  ALT  18  /  AlkPhos  121<H>  07-13      Urinalysis Basic - ( 13 Jul 2023 08:22 )    Color: x / Appearance: x / SG: x / pH: x  Gluc: 94 mg/dL / Ketone: x  / Bili: x / Urobili: x   Blood: x / Protein: x / Nitrite: x   Leuk Esterase: x / RBC: x / WBC x   Sq Epi: x / Non Sq Epi: x / Bacteria: x            WBC:  WBC Count: 18.67 K/uL (07-13 @ 08:22)  WBC Count: 20.17 K/uL (07-12 @ 05:42)  WBC Count: 26.51 K/uL (07-11 @ 19:12)  WBC Count: 19.09 K/uL (07-11 @ 08:33)  WBC Count: 19.81 K/uL (07-10 @ 09:00)      MICROBIOLOGY:  RECENT CULTURES:  07-10 .Blood Blood XXXX XXXX   No growth at 72 Hours    07-10 .Blood Blood XXXX XXXX   No growth at 72 Hours                    Sodium:  Sodium: 130 mmol/L (07-13 @ 08:22)  Sodium: 133 mmol/L (07-12 @ 05:42)  Sodium: 135 mmol/L (07-11 @ 17:09)  Sodium: 135 mmol/L (07-11 @ 08:33)  Sodium: 133 mmol/L (07-10 @ 09:00)      8.60 mg/dL 07-13 @ 08:22  7.90 mg/dL 07-12 @ 05:42  6.60 mg/dL 07-11 @ 17:09  9.30 mg/dL 07-11 @ 08:33  8.10 mg/dL 07-10 @ 09:00      Hemoglobin:  Hemoglobin: 11.9 g/dL (07-13 @ 08:22)  Hemoglobin: 11.4 g/dL (07-12 @ 05:42)  Hemoglobin: 11.2 g/dL (07-11 @ 19:12)  Hemoglobin: 12.4 g/dL (07-11 @ 08:33)  Hemoglobin: 11.5 g/dL (07-10 @ 09:00)      Platelets: Platelet Count - Automated: 451 K/uL (07-13 @ 08:22)  Platelet Count - Automated: 544 K/uL (07-12 @ 05:42)  Platelet Count - Automated: 520 K/uL (07-11 @ 19:12)  Platelet Count - Automated: 490 K/uL (07-11 @ 08:33)  Platelet Count - Automated: 465 K/uL (07-10 @ 09:00)      LIVER FUNCTIONS - ( 13 Jul 2023 08:22 )  Alb: 2.3 g/dL / Pro: 7.1 g/dL / ALK PHOS: 121 U/L / ALT: 18 U/L / AST: 30 U/L / GGT: x             Urinalysis Basic - ( 13 Jul 2023 08:22 )    Color: x / Appearance: x / SG: x / pH: x  Gluc: 94 mg/dL / Ketone: x  / Bili: x / Urobili: x   Blood: x / Protein: x / Nitrite: x   Leuk Esterase: x / RBC: x / WBC x   Sq Epi: x / Non Sq Epi: x / Bacteria: x        RADIOLOGY & ADDITIONAL STUDIES:      MICROBIOLOGY:  RECENT CULTURES:  07-10 .Blood Blood XXXX XXXX   No growth at 72 Hours    07-10 .Blood Blood XXXX XXXX   No growth at 72 Hours

## 2023-07-14 NOTE — PROGRESS NOTE ADULT - ASSESSMENT
72-year-old female with significant past medical history for hypertension, diabetes, dementia, end-stage renal disease on hemodialysis presents to the ED status post unwitnessed fall from [facility].  Patient states that she heard some voices then rolled out of bed.  Patient complaining of right hip pain.  Unknown head trauma.  + Eliquis < from: Xray Femur showed  Fractures including the medial wall of the acetabulum and inferior right pubic ramus Admitted for pain management ,PT/OT

## 2023-07-14 NOTE — PROGRESS NOTE ADULT - SUBJECTIVE AND OBJECTIVE BOX
Patient is a 72y Female whom presented to the hospital with esrd on hd     PAST MEDICAL & SURGICAL HISTORY:  HTN (hypertension)      h/o Anxiety attack      Depression      h/o Myocardial infarct 2007      h/o Hepatitis A 1969  currently resolved, no symptoms      Murmur, cardiac      h/o Smoking  quitted 3/2012      Anemia secondary to renal failure      ESRD on dialysis      Falls      Ataxia      Type 2 diabetes mellitus      Peripheral vascular disease, unspecified      CAD (coronary artery disease)      Diabetes      coronary stent 2007      s/p Ovarian cyst removal      s/p surgical removal of benign Skin lesion epigastric area      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 650 milliGRAM(s) Oral every 6 hours  aspirin enteric coated 81 milliGRAM(s) Oral daily  cloNIDine 0.1 milliGRAM(s) Oral two times a day  dextrose 5%. 1000 milliLiter(s) (50 mL/Hr) IV Continuous <Continuous>  dextrose 5%. 1000 milliLiter(s) (100 mL/Hr) IV Continuous <Continuous>  dextrose 50% Injectable 25 Gram(s) IV Push once  dextrose 50% Injectable 12.5 Gram(s) IV Push once  dextrose 50% Injectable 25 Gram(s) IV Push once  diltiazem    Tablet 60 milliGRAM(s) Oral three times a day  dronabinol 2.5 milliGRAM(s) Oral two times a day before meals  glucagon  Injectable 1 milliGRAM(s) IntraMuscular once  imipramine 50 milliGRAM(s) Oral daily  insulin lispro (ADMELOG) corrective regimen sliding scale   SubCutaneous three times a day before meals  metoprolol tartrate 100 milliGRAM(s) Oral two times a day  mirtazapine 7.5 milliGRAM(s) Oral at bedtime  multivitamin 1 Tablet(s) Oral daily  pantoprazole    Tablet 40 milliGRAM(s) Oral before breakfast  senna 2 Tablet(s) Oral at bedtime  sodium chloride 0.45%. 1000 milliLiter(s) (50 mL/Hr) IV Continuous <Continuous>      Allergies    No Known Drug Allergies  latex (Hives)                                                11.2   15.59 )-----------( 519      ( 14 Jul 2023 11:40 )             35.4       CBC Full  -  ( 14 Jul 2023 11:40 )  WBC Count : 15.59 K/uL  RBC Count : 3.60 M/uL  Hemoglobin : 11.2 g/dL  Hematocrit : 35.4 %  Platelet Count - Automated : 519 K/uL  Mean Cell Volume : 98.3 fl  Mean Cell Hemoglobin : 31.1 pg  Mean Cell Hemoglobin Concentration : 31.6 gm/dL  Auto Neutrophil # : x  Auto Lymphocyte # : x  Auto Monocyte # : x  Auto Eosinophil # : x  Auto Basophil # : x  Auto Neutrophil % : x  Auto Lymphocyte % : x  Auto Monocyte % : x  Auto Eosinophil % : x  Auto Basophil % : x      07-14    134<L>  |  94<L>  |  80<H>  ----------------------------<  236<H>  5.3   |  26  |  7.00<H>    Ca    9.8      14 Jul 2023 11:40  Phos  7.4     07-14  Mg     2.7     07-14    TPro  7.1  /  Alb  2.3<L>  /  TBili  0.5  /  DBili  x   /  AST  30  /  ALT  18  /  AlkPhos  121<H>  07-13      CAPILLARY BLOOD GLUCOSE      POCT Blood Glucose.: 234 mg/dL (14 Jul 2023 11:30)  POCT Blood Glucose.: 206 mg/dL (14 Jul 2023 07:40)  POCT Blood Glucose.: 211 mg/dL (13 Jul 2023 21:41)  POCT Blood Glucose.: 218 mg/dL (13 Jul 2023 17:12)      Vital Signs Last 24 Hrs  T(C): 36.7 (14 Jul 2023 12:32), Max: 37.2 (13 Jul 2023 20:29)  T(F): 98.1 (14 Jul 2023 12:32), Max: 99 (13 Jul 2023 20:29)  HR: 69 (14 Jul 2023 13:22) (69 - 103)  BP: 166/79 (14 Jul 2023 13:22) (103/66 - 166/79)  BP(mean): 80 (13 Jul 2023 16:00) (80 - 80)  RR: 20 (14 Jul 2023 12:32) (18 - 21)  SpO2: 92% (14 Jul 2023 12:32) (92% - 99%)    Parameters below as of 14 Jul 2023 12:32  Patient On (Oxygen Delivery Method): room air        Urinalysis Basic - ( 14 Jul 2023 11:40 )    Color: x / Appearance: x / SG: x / pH: x  Gluc: 236 mg/dL / Ketone: x  / Bili: x / Urobili: x   Blood: x / Protein: x / Nitrite: x   Leuk Esterase: x / RBC: x / WBC x   Sq Epi: x / Non Sq Epi: x / Bacteria: x        Intolerances                                  SOCIAL HISTORY:  Denies ETOh,Smoking,     FAMILY HISTORY:  FH: myocardial infarction (Father)    FH: myocardial infarction (Father)    Family history of type 2 diabetes mellitus in brother (Sibling)        REVIEW OF SYSTEMS:    unable to obtained a good review system                     PHYSICAL EXAM:    Constitutional: NAD  HEENT: conjunctive   clear   Neck:  No JVD  Respiratory: CTAB  Cardiovascular: S1 and S2  Gastrointestinal: BS+, soft, NT/ND  Extremities: No peripheral edema  Neurological:  no focal deficits  pos fistula

## 2023-07-14 NOTE — PROGRESS NOTE ADULT - ASSESSMENT
72-year-old female former smoker quit 3/2012  with significant past medical history for hypertension, CAD sp cabg  PVD  diabetes, dementia, end-stage renal disease on hemodialysis   a fib on eliquis sp recent hosp stay 5/17-5/24/2023  admitted 3/7-3/11/2023 and before that 2/22-2/25/2023   . During 5/2023 admission she had   . ENTERORHINOVIRUS INFECTION RESP TRACT 5/17  . PLEURAL EFFUSION SP l THORA 5/18 TRANSUDATE   Patient now  presented to the ED 7/1/2023 status post unwitnessed fall from [facility].  Patient states that she heard some voices then rolled out of bed.  Patient complaining of right hip pain.  Unknown head trauma.  + Ashleedeangelo     She was found to have acetabular fracture which Ortho was contacted and they plan non operative management Pt was admitted to [facility] for bleed as she was on apixaban and was noted to have pl effsn    . 7/1/2023  I was asked to admit pt                      (01 Jul 2023 13:32)      esrd on hd plan for hd today        ANEMIA PLAN:  Anemia of chronic disease:  We will continue epo aiming for a HCT of 32-36 %.   We will monitor Iron stores, B12 and RBC folate .      BP monitoring,continue current midodrine ,    Accuchecks monitoring and insulin sliding scale coverage, no concentrated sweets diet, serial labs and f/up with PMD, monitor HB A 1 C every 3-4 mnth

## 2023-07-14 NOTE — PROGRESS NOTE ADULT - ASSESSMENT
REASON FOR VISIT  .. Management of problems listed below      NOTEWORTHY FINDINGS.     PHYSICAL EXAM    HEENT Unremarkable  atraumatic   RESP Fair air entry  Harsh breath sound   CARDIAC S1 S2 No S3     NO JVD    ABDOMEN No hepatosplenomegaly   PEDAL EDEMA present No calf tenderness  NO rash       GENERAL DATA .     GOC.    . prior  ICU STAY.   .. 7/11/2023   COVID.   ..    BEST PRACTICE ISSUES.    HOB ELEVATN.   .. Yes  DVT PPLX.   .. 7/10 apixa held for thora  .. 7/3/2023 apixa 2.5 x2 restarted as IR feels we should tap fluid only if she develops shortness of breath  ..    7/2/2023 Apixaba 2.5 x2 held for thorac   STRESS ULCER PPLX.   ..      INFECTION PPLX.   ..  7/11 chlorhexidine 2%  .. 7/12 mupirocin 2%   SP SW AMINAH.    ..      7/13/2023 puree thin  DIET.    .. 7/11 npo   ..  7/4/2023 mince moist dc ed   IV fl.  ..     PROCEDURES.  ..   PAST PROCEDURES.    ..   GENERAL DATA .   ALLGY.  ..     latex                     WT.  ..  7/1/2023 54  BMI.  ..    7/1/2023 17     ABGS.  . 7/11/2023 7p 3l 743/34/182     VS/ PO/IO/ VENT/ DRIPS  7/14/2023 afeb 90 160/60     CC/DOA.        . 7/1/2023 Pt sent from [facility] for unwitnessed fall out of bed.       .  PRESENTATION.   . 7/1/2023 72-year-old female former smoker quit 3/2012  with PMH for hypertension, CAD sp cabg  PVD   sp R-> L bifem - fem BK pop bypass  Fem pop bypass 6/21/2022  Partial ray amputation r great toe 6/22/2022  diabetes, dementia, ESRD on hemodialysis   a fib on eliquis sp recent hosp stay 5/17-5/24/2023     . ENTERORHINOVIRUS INFECTION RESP TRACT 5/17  . PLEURAL EFFUSION SP l THORA 5/18 TRANSUDATE     COURSE   . ACETABULAR Fx Ortho recommended non surgical mgmt  . PL EFFSN Was initially decided to hold off thora unless she develops sob  . LEUKOCYTOSIS 7/11/2023 dw ID will plan thorac to exclude infecn  . ALTERED MENTAL STATE 7/11 Tr to icu poss ic bleed 7/13 tr out of icu      PROBLEMS/ASSESSMENT/RECOMMENDATIONS (A/R).  PULMONARY.  . GAS EXCHANGE.  .. A/R.   .. Monitor pulse ox and target po 90-95%    . PLEURAL EFFSN.  .. RELEVANT PAST HISTORU  .. 5/18/2023 l thorac 1.5 l   .. 5/18 pl fl l 13 m 73 p 5 afb sm (-) g 131 l 102/268 (.38) ph 7.6 pr 2.6/6.4 (.4)  .. Fluid transudate  .. WORKUP   .. CXR 7/1/2023 cxr Mod large l pl effs or lower zone airspace consolidation/atelecatsis   .. CT ch 7/1/2023 Ct ch   .... Decreased mod l effs since 5/17/2023   .. EVENTS  .. 7/2/2023 DW pt and dw son consensus is that they want fluid remived   .. 7/3/2023 dw ir plan changed plan is to tap if she becomes symptimatic   .. 7/3/2023 Thora cancelled as pt is comfortable will pan thora if she becomes symptomatic   .. 7/10/2023 as pt has persistent leukocytosis if no impovement will need thora so apixa 2.5 bid staoppd   . 7/11/2023  thora diagn orderd  . 7/11 Thora deferred  .. A/R  .. 7/14/2023 Will reorer thora if  ID recommends     ID.  . ESBL UTI.7/3  .. WORKUP.  .. W 7/1-7/2-7/3-7/6-7/7-7/9-7/10-7/11-7/12-7/13/2023 W 15 - 14 - 13- 13 - 15- 18 - 19 - 19 - 20- 18    .. EVENTS.   .. 7/1/2023 Checlk blood and urine cs   .. MICRO.  .. uc 7/3/2023 100 k pseudomonas carbapenem resist    ..  7/3/2023 50-99 Klebs esbl   .. bc 7/10 (-)   .. mrsa 7/12 (+)   .. ABIO.  .. 7/8/2023 meropenem 500 x 3d Dr ALCAZAR dced   .. 7/11 ceftolozane tazobactam 150.3 zerbaxa x 2 do     . PERSISTENT LEUKOCYTOSIS.  .. w 7/11/2023 w 19  .. ct hip r 7/11/2023   .... no hematoma or fluid collectn   .... r acetabular fc 2.1 x 3.4 cm cortical stepoff   .. EVENTS.  .. 7/11/2023 dw id  will plan thorac   .. 7/11 ceftolozane tazobactam 150.3 zerbaxa x 2 do   .. A/R.  .. 7/12/2023 pt now in icu sec possible ic bleed   .. 7/12/2023 Thoracentesis scheduling will be decided by icu     CARDIO.  . HEMODYNAMICS.  .. 7/8/2023 midodrine 10.3 Dr CLIFFORD   .. target map 65 (+)     . CAD.  .. 7/1/2023 ASA 81   .. A/R  .. cont rx    . A fib.  .. 7/1/2023 CARDIZEM 60.3   .. 7/3/2023 apixaba 2.5 x2   .. 7/10 apixa held  .. AR  .. Cont rx  .. apixa to be restarted when cleared by neuro    HEMAT.  .. WORKUP.  .. Hb 7/1-7/2-7/3-7/6-7/9-7/11-7/12/2023 Hb 13 - 11.8 - 11.2 - 11- 12 -12.4- 11.4    .. INR 7/1/2023    .. A/R  .. As pt was on apixaba will monitorserially     RENAL.  . ESRD.  .. WORKUP  .. NA 7/1/2023    .. K 7/1/2023 K 5.1   .. CR 7/1/2023 CR 5  .. HD dependent     ORTHO.  . FALL 7/1/2023.  .. CT head C sp 7/1/2023 CT HC (-)     . FRACTURE ACETABULUM r INFERIOR PUBIC RAMUS r 7/1/2023   .. IMAGING.  .. XR Pelvis and r hip 7/1/2023 XR Fractures r acetabulum and inf r pubic ramus   .. 7/1/2023 ct pelvix   .... comminuted r acetabular fx involving r sup and inf pubic rami medial wallacetabulum sup acetabulum and post wall acetabulum   .... small hematoma r pelvic sidewall   .. ORTHO.  .. 7/1/2023 DW Dr Dias She spoke to Ortho Dr Jenkins group and plan is for nonsurgical management  .. A/R  .. Monitor serial Hb ro bleed     . IC BLEED 7/11.  .. ct head 7/11 cw cta h 7/11   .... 5 mm hyperattenuating curvilinear focus l parietal lobe may be sl increasd from 3 mm on 7/11 520p may represent small focus of hrrge v contrast training in ac infacrt  .. may need transfer tertiary center    .. mr brain no contr 7/12   .... r parietal remote petechial hrrge   .... l parietal petechial hrrge no excluded   .. Follow with neuro     . ALTERED MENTAL STATE 7/11  .. 7/11/2023  Tr to icu poss ic bleed for q h neurochecl   .. 7/13 tr out of icu    . MAIN ISSUES  .. FX acetabulum on conservative rx  . A fib  7/14/2023 doac to be restarted when cleared by neuro   . ESRD   . UTI 7/8/2023 started on meropenem by ID (UTI)   . LEUKOCYTOSIS 7/11/2023 dw ID will plan thorac to exclude infecn  7/11 ceftolozane/tazobactam 150.3 x 2 do thorac deferred   . ALTERED MENTAL STATE 7/11 Tr to icu poss ic bleed tr to tertiary being considered 7/13 tr out of icu      TIME SPENT   . About 36 minutes aggregate care time spent on encounter; activities included   direct patient care, counseling and/or coordinating care reviewing notes, lab data/ imaging , discussion with multidisciplinary team/ patient  /family and explaining in detail risks, benefits, alternatives  of the recommendations     JOSE F LAO 72 f7/1/2023 1948 DR HARRY ALBRIGHT

## 2023-07-14 NOTE — PROGRESS NOTE ADULT - SUBJECTIVE AND OBJECTIVE BOX
[facility]  INFECTIOUS DISEASES - [address], [place] [address]  Tel: [phone]     Fax: [phone]  =======================================================    SHILO KOHLER [id number]    Follow up: No fevers. Transferred out of ICU. Denied any pain or SOB at the time of my exam.    Allergies:  No Known Drug Allergies  latex (Hives)      Antibiotics:  acetaminophen     Tablet .. 650 milliGRAM(s) Oral every 6 hours PRN  albumin human 25% IVPB 50 milliLiter(s) IV Intermittent <User Schedule>  artificial  tears Solution 1 Drop(s) Both EYES every 12 hours  aspirin  chewable 81 milliGRAM(s) Oral daily  ceftolozane/tazobactam IVPB 150 milliGRAM(s) IV Intermittent every 8 hours  dextrose 5%. 1000 milliLiter(s) IV Continuous <Continuous>  dextrose 5%. 1000 milliLiter(s) IV Continuous <Continuous>  dextrose 50% Injectable 12.5 Gram(s) IV Push once  dextrose 50% Injectable 25 Gram(s) IV Push once  dextrose 50% Injectable 25 Gram(s) IV Push once  dextrose Oral Gel 15 Gram(s) Oral once PRN  diltiazem    Tablet 60 milliGRAM(s) Oral three times a day  dronabinol 2.5 milliGRAM(s) Oral two times a day before meals  glucagon  Injectable 1 milliGRAM(s) IntraMuscular once  imipramine 50 milliGRAM(s) Oral daily  insulin glargine Injectable (LANTUS) 13 Unit(s) SubCutaneous at bedtime  insulin lispro (ADMELOG) corrective regimen sliding scale   SubCutaneous three times a day before meals  insulin lispro (ADMELOG) corrective regimen sliding scale   SubCutaneous at bedtime  lidocaine   4% Patch 1 Patch Transdermal daily  metoprolol tartrate 100 milliGRAM(s) Oral two times a day  midodrine. 10 milliGRAM(s) Oral three times a day  mirtazapine 7.5 milliGRAM(s) Oral at bedtime  mupirocin 2% Nasal 1 Application(s) Both Nostrils two times a day  pantoprazole    Tablet 40 milliGRAM(s) Oral before breakfast  polyethylene glycol 3350 17 Gram(s) Oral daily  senna 2 Tablet(s) Oral at bedtime  sodium zirconium cyclosilicate 5 Gram(s) Oral daily       REVIEW OF SYSTEMS:  limited 2/2 dementia     Physical Exam:  ICU Vital Signs Last 24 Hrs  T(C): 36.7 (14 Jul 2023 12:32), Max: 37.2 (13 Jul 2023 20:29)  T(F): 98.1 (14 Jul 2023 12:32), Max: 99 (13 Jul 2023 20:29)  HR: 69 (14 Jul 2023 13:22) (69 - 103)  BP: 166/79 (14 Jul 2023 13:22) (103/66 - 166/79)  BP(mean): 80 (13 Jul 2023 16:00) (80 - 80)  ABP: --  ABP(mean): --  RR: 20 (14 Jul 2023 12:32) (18 - 21)  SpO2: 92% (14 Jul 2023 12:32) (92% - 99%)    O2 Parameters below as of 14 Jul 2023 12:32  Patient On (Oxygen Delivery Method): room air             GEN: NAD  HEENT: normocephalic and atraumatic.  NECK: Supple.  No lymphadenopathy   LUNGS: Normal respiratory effort  HEART: Regular rate and rhythm   ABDOMEN: Soft, nontender, and nondistended.    EXTREMITIES: No leg edema.   SKIN: (+) ulcer on coccyx  NEURO: AO x 1-2    Labs:  07-14    134<L>  |  94<L>  |  80<H>  ----------------------------<  236<H>  5.3   |  26  |  7.00<H>    Ca    9.8      14 Jul 2023 11:40  Phos  7.4     07-14  Mg     2.7     07-14    TPro  7.1  /  Alb  2.3<L>  /  TBili  0.5  /  DBili  x   /  AST  30  /  ALT  18  /  AlkPhos  121<H>  07-13                          11.2   15.59 )-----------( 519      ( 14 Jul 2023 11:40 )             35.4       Urinalysis Basic - ( 14 Jul 2023 11:40 )    Color: x / Appearance: x / SG: x / pH: x  Gluc: 236 mg/dL / Ketone: x  / Bili: x / Urobili: x   Blood: x / Protein: x / Nitrite: x   Leuk Esterase: x / RBC: x / WBC x   Sq Epi: x / Non Sq Epi: x / Bacteria: x      LIVER FUNCTIONS - ( 13 Jul 2023 08:22 )  Alb: 2.3 g/dL / Pro: 7.1 g/dL / ALK PHOS: 121 U/L / ALT: 18 U/L / AST: 30 U/L / GGT: x             RECENT CULTURES:  07-10 @ 16:25 .Blood Blood     No growth at 72 Hours        07-10 @ 16:20 .Blood Blood     No growth at 72 Hours        07-03 @ 21:20 Clean Catch Clean Catch (Midstream) Pseudomonas aeruginosa (Carbapenem Resistant)  Klebsiella pneumoniae ESBL    >100,000 CFU/ml Pseudomonas aeruginosa (Carbapenem Resistant)  50,000 - 99,000 CFU/mL Klebsiella pneumoniae ESBL        07-03 @ 16:10 .Blood Blood     No growth at 5 days        07-03 @ 16:05 .Blood Blood     No growth at 5 days              All imaging and data are reviewed.

## 2023-07-14 NOTE — PROGRESS NOTE ADULT - SUBJECTIVE AND OBJECTIVE BOX
CAPILLARY BLOOD GLUCOSE      POCT Blood Glucose.: 206 mg/dL (14 Jul 2023 07:40)  POCT Blood Glucose.: 211 mg/dL (13 Jul 2023 21:41)  POCT Blood Glucose.: 218 mg/dL (13 Jul 2023 17:12)  POCT Blood Glucose.: 96 mg/dL (13 Jul 2023 11:38)  POCT Blood Glucose.: 96 mg/dL (13 Jul 2023 10:15)      Vital Signs Last 24 Hrs  T(C): 36.7 (14 Jul 2023 05:46), Max: 37.2 (13 Jul 2023 20:29)  T(F): 98.1 (14 Jul 2023 05:46), Max: 99 (13 Jul 2023 20:29)  HR: 81 (14 Jul 2023 05:46) (75 - 108)  BP: 129/87 (14 Jul 2023 05:46) (87/42 - 167/76)  BP(mean): 80 (13 Jul 2023 16:00) (60 - 105)  RR: 18 (14 Jul 2023 05:46) (16 - 30)  SpO2: 99% (14 Jul 2023 05:46) (90% - 99%)    Parameters below as of 14 Jul 2023 00:30  Patient On (Oxygen Delivery Method): room air        General: WN/WD NAD  Respiratory: CTA B/L  CV: RRR, S1S2, no murmurs, rubs or gallops  Abdominal: Soft, NT, ND +BS, Last BM  Extremities: No edema, + peripheral pulses     07-13    130<L>  |  95<L>  |  125<H>  ----------------------------<  94  5.2   |  19<L>  |  8.60<H>    Ca    9.8      13 Jul 2023 08:22  Phos  8.2     07-13  Mg     2.6     07-13    TPro  7.1  /  Alb  2.3<L>  /  TBili  0.5  /  DBili  x   /  AST  30  /  ALT  18  /  AlkPhos  121<H>  07-13      dextrose 50% Injectable 12.5 Gram(s) IV Push once  dextrose 50% Injectable 25 Gram(s) IV Push once  dextrose 50% Injectable 25 Gram(s) IV Push once  dextrose Oral Gel 15 Gram(s) Oral once PRN  glucagon  Injectable 1 milliGRAM(s) IntraMuscular once  insulin glargine Injectable (LANTUS) 13 Unit(s) SubCutaneous at bedtime  insulin lispro (ADMELOG) corrective regimen sliding scale   SubCutaneous three times a day before meals  insulin lispro (ADMELOG) corrective regimen sliding scale   SubCutaneous at bedtime

## 2023-07-14 NOTE — PROGRESS NOTE ADULT - SUBJECTIVE AND OBJECTIVE BOX
Patient is a 72y Female with a known history of :  Hip fracture [S72.009A]    Closed pelvic fracture [S32.9XXA]    Pubic ramus fracture [S32.599A]    Right acetabular fracture [S32.401A]    ESRD on dialysis [N18.6]    Prophylactic measure [Z29.9]    HTN (hypertension) [I10]    Fall [W19.XXXA]    Pleural effusion [J90]    Acute febrile illness [R50.9]    Type 2 diabetes mellitus [E11.9]    Sacral decubitus ulcer, stage IV [L89.154]    ICH (intracerebral hemorrhage) [I61.9]    Encephalopathy, unspecified [G93.40]      HPI:  72-year-old female with significant past medical history for hypertension, diabetes, dementia, end-stage renal disease on hemodialysis presents to the ED status post unwitnessed fall from [facility].  Patient states that she heard some voices then rolled out of bed.  Patient complaining of right hip pain.  Unknown head trauma.  + Eliquis < from: Xray Femur showed  Fractures including the medial wall of the acetabulum and inferior right pubic ramus Admitted for pain management ,PT/OT        (01 Jul 2023 15:24)      REVIEW OF SYSTEMS:    CONSTITUTIONAL: No fever, weight loss, or fatigue  EYES: No eye pain, visual disturbances, or discharge  ENMT:  No difficulty hearing, tinnitus, vertigo; No sinus or throat pain  NECK: No pain or stiffness  BREASTS: No pain, masses, or nipple discharge  RESPIRATORY: No cough, wheezing, chills or hemoptysis; No shortness of breath  CARDIOVASCULAR: No chest pain, palpitations, dizziness, or leg swelling  GASTROINTESTINAL: No abdominal or epigastric pain. No nausea, vomiting, or hematemesis; No diarrhea or constipation. No melena or hematochezia.  GENITOURINARY: No dysuria, frequency, hematuria, or incontinence  NEUROLOGICAL: No headaches, memory loss, loss of strength, numbness, or tremors  SKIN: No itching, burning, rashes, or lesions   LYMPH NODES: No enlarged glands  ENDOCRINE: No heat or cold intolerance; No hair loss  MUSCULOSKELETAL: No joint pain or swelling; No muscle, back, or extremity pain  PSYCHIATRIC: No depression, anxiety, mood swings, or difficulty sleeping  HEME/LYMPH: No easy bruising, or bleeding gums  ALLERGY AND IMMUNOLOGIC: No hives or eczema    MEDICATIONS  (STANDING):  albumin human 25% IVPB 50 milliLiter(s) IV Intermittent <User Schedule>  artificial  tears Solution 1 Drop(s) Both EYES every 12 hours  aspirin enteric coated 81 milliGRAM(s) Oral daily  ceftolozane/tazobactam IVPB 150 milliGRAM(s) IV Intermittent every 8 hours  dextrose 5%. 1000 milliLiter(s) (50 mL/Hr) IV Continuous <Continuous>  dextrose 5%. 1000 milliLiter(s) (100 mL/Hr) IV Continuous <Continuous>  dextrose 50% Injectable 12.5 Gram(s) IV Push once  dextrose 50% Injectable 25 Gram(s) IV Push once  dextrose 50% Injectable 25 Gram(s) IV Push once  diltiazem    Tablet 60 milliGRAM(s) Oral three times a day  dronabinol 2.5 milliGRAM(s) Oral two times a day before meals  glucagon  Injectable 1 milliGRAM(s) IntraMuscular once  imipramine 50 milliGRAM(s) Oral daily  insulin glargine Injectable (LANTUS) 13 Unit(s) SubCutaneous at bedtime  insulin lispro (ADMELOG) corrective regimen sliding scale   SubCutaneous three times a day before meals  insulin lispro (ADMELOG) corrective regimen sliding scale   SubCutaneous at bedtime  lidocaine   4% Patch 1 Patch Transdermal daily  metoprolol tartrate 100 milliGRAM(s) Oral two times a day  midodrine. 10 milliGRAM(s) Oral three times a day  mirtazapine 7.5 milliGRAM(s) Oral at bedtime  mupirocin 2% Nasal 1 Application(s) Both Nostrils two times a day  pantoprazole    Tablet 40 milliGRAM(s) Oral before breakfast  polyethylene glycol 3350 17 Gram(s) Oral daily  senna 2 Tablet(s) Oral at bedtime  sodium zirconium cyclosilicate 5 Gram(s) Oral daily    MEDICATIONS  (PRN):  acetaminophen     Tablet .. 650 milliGRAM(s) Oral every 6 hours PRN Temp greater or equal to 38C (100.4F), Mild Pain (1 - 3)  dextrose Oral Gel 15 Gram(s) Oral once PRN Blood Glucose LESS THAN 70 milliGRAM(s)/deciliter      ALLERGIES: No Known Drug Allergies  latex (Hives)      FAMILY HISTORY:  FH: myocardial infarction (Father)    FH: myocardial infarction (Father)    Family history of type 2 diabetes mellitus in brother (Sibling)        PHYSICAL EXAMINATION:  -----------------------------  T(C): 36.7 (07-14-23 @ 05:46), Max: 37.2 (07-13-23 @ 20:29)  HR: 81 (07-14-23 @ 05:46) (75 - 108)  BP: 129/87 (07-14-23 @ 05:46) (87/42 - 167/76)  RR: 18 (07-14-23 @ 05:46) (16 - 30)  SpO2: 99% (07-14-23 @ 05:46) (90% - 99%)  Wt(kg): --    07-13 @ 07:01  -  07-14 @ 07:00  --------------------------------------------------------  IN:    IV PiggyBack: 100 mL  Total IN: 100 mL    OUT:    Other (mL): 500 mL  Total OUT: 500 mL    Total NET: -400 mL            VITALS  T(C): 36.7 (07-14-23 @ 05:46), Max: 37.2 (07-13-23 @ 20:29)  HR: 81 (07-14-23 @ 05:46) (75 - 108)  BP: 129/87 (07-14-23 @ 05:46) (87/42 - 167/76)  RR: 18 (07-14-23 @ 05:46) (16 - 30)  SpO2: 99% (07-14-23 @ 05:46) (90% - 99%)    Constitutional: well developed, normal appearance, well groomed, well nourished, no deformities and no acute distress.   Eyes: the conjunctiva exhibited no abnormalities and the eyelids demonstrated no xanthelasmas.   HEENT: normal oral mucosa, no oral pallor and no oral cyanosis.   Neck: normal jugular venous A waves present, normal jugular venous V waves present and no jugular venous wilson A waves.   Pulmonary: no respiratory distress, normal respiratory rhythm and effort, no accessory muscle use and lungs were clear to auscultation bilaterally.   Cardiovascular: heart rate and rhythm were normal, normal S1 and S2 and no murmur, gallop, rub, heave or thrill are present.   Abdomen: soft, non-tender, no hepato-splenomegaly and no abdominal mass palpated.   Musculoskeletal: the gait could not be assessed..   Extremities: no clubbing of the fingernails, no localized cyanosis, no petechial hemorrhages and no ischemic changes.   Skin: normal skin color and pigmentation, no rash, no venous stasis, no skin lesions, no skin ulcer and no xanthoma was observed.   Psychiatric: oriented to person, place, and time, the affect was normal, the mood was normal and not feeling anxious.     LABS:   --------  07-13    130<L>  |  95<L>  |  125<H>  ----------------------------<  94  5.2   |  19<L>  |  8.60<H>    Ca    9.8      13 Jul 2023 08:22  Phos  8.2     07-13  Mg     2.6     07-13    TPro  7.1  /  Alb  2.3<L>  /  TBili  0.5  /  DBili  x   /  AST  30  /  ALT  18  /  AlkPhos  121<H>  07-13                         11.9   18.67 )-----------( 451      ( 13 Jul 2023 08:22 )             37.0                 RADIOLOGY:  -----------------    ECG:     ECHO:

## 2023-07-14 NOTE — PROGRESS NOTE ADULT - SUBJECTIVE AND OBJECTIVE BOX
Neurology follow up note    SHILO [id number]      Interval History:    Patient feels ok no new complaints.    Allergies    No Known Drug Allergies  latex (Hives)    Intolerances        MEDICATIONS    acetaminophen     Tablet .. 650 milliGRAM(s) Oral every 6 hours PRN  albumin human 25% IVPB 50 milliLiter(s) IV Intermittent <User Schedule>  artificial  tears Solution 1 Drop(s) Both EYES every 12 hours  aspirin enteric coated 81 milliGRAM(s) Oral daily  ceftolozane/tazobactam IVPB 150 milliGRAM(s) IV Intermittent every 8 hours  dextrose 5%. 1000 milliLiter(s) IV Continuous <Continuous>  dextrose 5%. 1000 milliLiter(s) IV Continuous <Continuous>  dextrose 50% Injectable 12.5 Gram(s) IV Push once  dextrose 50% Injectable 25 Gram(s) IV Push once  dextrose 50% Injectable 25 Gram(s) IV Push once  dextrose Oral Gel 15 Gram(s) Oral once PRN  diltiazem    Tablet 60 milliGRAM(s) Oral three times a day  dronabinol 2.5 milliGRAM(s) Oral two times a day before meals  glucagon  Injectable 1 milliGRAM(s) IntraMuscular once  imipramine 50 milliGRAM(s) Oral daily  insulin glargine Injectable (LANTUS) 13 Unit(s) SubCutaneous at bedtime  insulin lispro (ADMELOG) corrective regimen sliding scale   SubCutaneous three times a day before meals  insulin lispro (ADMELOG) corrective regimen sliding scale   SubCutaneous at bedtime  lidocaine   4% Patch 1 Patch Transdermal daily  metoprolol tartrate 100 milliGRAM(s) Oral two times a day  midodrine. 10 milliGRAM(s) Oral three times a day  mirtazapine 7.5 milliGRAM(s) Oral at bedtime  mupirocin 2% Nasal 1 Application(s) Both Nostrils two times a day  pantoprazole    Tablet 40 milliGRAM(s) Oral before breakfast  polyethylene glycol 3350 17 Gram(s) Oral daily  senna 2 Tablet(s) Oral at bedtime  sodium zirconium cyclosilicate 5 Gram(s) Oral daily              Vital Signs Last 24 Hrs  T(C): 36.7 (14 Jul 2023 05:46), Max: 37.2 (13 Jul 2023 20:29)  T(F): 98.1 (14 Jul 2023 05:46), Max: 99 (13 Jul 2023 20:29)  HR: 81 (14 Jul 2023 05:46) (75 - 108)  BP: 129/87 (14 Jul 2023 05:46) (87/42 - 167/76)  BP(mean): 80 (13 Jul 2023 16:00) (60 - 105)  RR: 18 (14 Jul 2023 05:46) (17 - 30)  SpO2: 99% (14 Jul 2023 05:46) (90% - 99%)    Parameters below as of 14 Jul 2023 00:30  Patient On (Oxygen Delivery Method): room air    REVIEW OF SYSTEMS:  Could not be obtained secondary to the patient being lethargic, but as per my conversation with spouse, she is wheelchair bound.  For the last few weeks, she has been having limited verbal output.    PHYSICAL EXAMINATION:   HEENT:  Head:  Normocephalic, atraumatic.  Eyes:  No scleral icterus.  Ears:  Hearing hard to evaluate secondary to the patient being lethargic.  NECK:  Supple.  RESPIRATORY:  Air entry bilaterally.  CARDIOVASCULAR:  S1 and S2 heard.  ABDOMEN:  Soft and nontender.  EXTREMITIES:  No clubbing or cyanosis was noted.      NEUROLOGIC:  The patient was arousable to verbal stimuli, opens eyes.  Pupils bilaterally were 4 mm, slight reactivity, on-off blink to visual threat.  To painful stimuli, the patient would say the word "ok"  now putting sentences together.  As per my conversation with spouse, lately she is becoming less and less verbal.  Motor:  With painful stimuli, elevated bilateral upper extremities with a slow drop, would say after 5 seconds, both fell to the ground, bilateral lower extremities were in a contracted position.  The patient does have a right hip flexor, is wheelchair bound, increased tone, slight movement of the feet with stimuli.    LABS:  CBC Full  -  ( 13 Jul 2023 08:22 )  WBC Count : 18.67 K/uL  RBC Count : 3.80 M/uL  Hemoglobin : 11.9 g/dL  Hematocrit : 37.0 %  Platelet Count - Automated : 451 K/uL  Mean Cell Volume : 97.4 fl  Mean Cell Hemoglobin : 31.3 pg  Mean Cell Hemoglobin Concentration : 32.2 gm/dL  Auto Neutrophil # : 13.71 K/uL  Auto Lymphocyte # : 1.33 K/uL  Auto Monocyte # : 2.26 K/uL  Auto Eosinophil # : 0.36 K/uL  Auto Basophil # : 0.16 K/uL  Auto Neutrophil % : 73.4 %  Auto Lymphocyte % : 7.1 %  Auto Monocyte % : 12.1 %  Auto Eosinophil % : 1.9 %  Auto Basophil % : 0.9 %    Urinalysis Basic - ( 13 Jul 2023 08:22 )    Color: x / Appearance: x / SG: x / pH: x  Gluc: 94 mg/dL / Ketone: x  / Bili: x / Urobili: x   Blood: x / Protein: x / Nitrite: x   Leuk Esterase: x / RBC: x / WBC x   Sq Epi: x / Non Sq Epi: x / Bacteria: x      07-13    130<L>  |  95<L>  |  125<H>  ----------------------------<  94  5.2   |  19<L>  |  8.60<H>    Ca    9.8      13 Jul 2023 08:22  Phos  8.2     07-13  Mg     2.6     07-13    TPro  7.1  /  Alb  2.3<L>  /  TBili  0.5  /  DBili  x   /  AST  30  /  ALT  18  /  AlkPhos  121<H>  07-13    Hemoglobin A1C:     LIVER FUNCTIONS - ( 13 Jul 2023 08:22 )  Alb: 2.3 g/dL / Pro: 7.1 g/dL / ALK PHOS: 121 U/L / ALT: 18 U/L / AST: 30 U/L / GGT: x           Vitamin B12         RADIOLOGY        ANALYSIS AND PLAN:  This is a 72-year-old with episode of unresponsiveness and altered mental status.  For episode of unresponsiveness and altered mental status, suspect most likely metabolic encephalopathy secondary to underlying infectious type process, possibly urinary tract, also questionable the patient had an event a few weeks ago.  As per previous notes a few months ago, the patient was more verbal and interactive, but as per my conversation with spouse, he states for the last few weeks, she has been speaking less with less interaction, questionable any type of new central nervous system event occurred a few weeks ago.  For history of underlying mild cognitive impairment versus subtle dementia secondary to the patient appears to be advanced, would recommend supportive therapy.  For history of hypertension, monitor systolic blood pressure, avoid hypotension if possible.  Please maintain a systolic blood pressure between 140 and 100.  mri reviewed no significant changes if needed cleared for AC risk vs benefits   overall more awake and interactive today 7/14    Spoke with spouse, Damir, at [phone] 7/12 called today went to voicemail 7/13 902am 7/14 911 am    Greater than 45 minutes of time was spent with the patient, plan of care, reviewing data, with greater than 50% of the visit was spent counseling and/or coordinating care with multidisciplinary healthcare team

## 2023-07-14 NOTE — PROGRESS NOTE ADULT - ASSESSMENT
73YO F PMH hypertension, diabetes, dementia, end-stage renal disease on hemodialysis, who presented status post unwitnessed fall from [facility]- rolled out of bed. Patient complained of right hip pain--found to have fractures including the medial wall of the acetabulum and inferior right pubic ramus. Patient found to have leukocytosis, initially thought to be reactive 2/2 hip fracture.    7/4: started on empiric cefepime on 7/4 given new fever  7/7: CT chest and A/P done, showed L pleural effusion but no signs of infection  7/8: Broadened to meropenem given ESBL klebsiella in urine  7/11: RRT called for unresponsiveness, CT head showed potential hemorrhage; given worsening leukocytosis to 26 of unclear etiology, started on Zerbaxa to cover CRE pseudomonas in urine  7/12: transferred to ICU  7/13: MRSA nasal PCR positive but low suspicion for MRSA infection, seen by Wound Care and no signs of wound infection  7/14: no fever and leukocytosis improving, WBC down to 15, repeat blood cultures remain no growth; transferred out of ICU    #Leukocytosis  #Positive urine culture    -continue ceftolozane-tazobactam (renally dosed) until 7/17  -nasal mupirocin x 5 days  -follow cultures to completion  -aspiration precautions  -wound care  -discussed with Dr. Norbert Tsai MD  Division of infectious Diseases  Cell [phone] between 8am and 6pm  After 6pm and over the weekends please call ID service line at [phone].

## 2023-07-15 LAB
ANION GAP SERPL CALC-SCNC: 12 MMOL/L — SIGNIFICANT CHANGE UP (ref 5–17)
BUN SERPL-MCNC: 86 MG/DL — HIGH (ref 7–23)
CALCIUM SERPL-MCNC: 9.4 MG/DL — SIGNIFICANT CHANGE UP (ref 8.5–10.1)
CHLORIDE SERPL-SCNC: 96 MMOL/L — SIGNIFICANT CHANGE UP (ref 96–108)
CO2 SERPL-SCNC: 19 MMOL/L — LOW (ref 22–31)
CREAT SERPL-MCNC: 8 MG/DL — HIGH (ref 0.5–1.3)
EGFR: 5 ML/MIN/1.73M2 — LOW
GLUCOSE SERPL-MCNC: 172 MG/DL — HIGH (ref 70–99)
HCT VFR BLD CALC: 33.5 % — LOW (ref 34.5–45)
HGB BLD-MCNC: 10.6 G/DL — LOW (ref 11.5–15.5)
MAGNESIUM SERPL-MCNC: 2.6 MG/DL — SIGNIFICANT CHANGE UP (ref 1.6–2.6)
MCHC RBC-ENTMCNC: 31.5 PG — SIGNIFICANT CHANGE UP (ref 27–34)
MCHC RBC-ENTMCNC: 31.6 GM/DL — LOW (ref 32–36)
MCV RBC AUTO: 99.4 FL — SIGNIFICANT CHANGE UP (ref 80–100)
NRBC # BLD: 0 /100 WBCS — SIGNIFICANT CHANGE UP (ref 0–0)
PLATELET # BLD AUTO: 384 K/UL — SIGNIFICANT CHANGE UP (ref 150–400)
POTASSIUM SERPL-MCNC: 5.3 MMOL/L — SIGNIFICANT CHANGE UP (ref 3.5–5.3)
POTASSIUM SERPL-SCNC: 5.3 MMOL/L — SIGNIFICANT CHANGE UP (ref 3.5–5.3)
RBC # BLD: 3.37 M/UL — LOW (ref 3.8–5.2)
RBC # FLD: 15.7 % — HIGH (ref 10.3–14.5)
SODIUM SERPL-SCNC: 127 MMOL/L — LOW (ref 135–145)
WBC # BLD: 17.51 K/UL — HIGH (ref 3.8–10.5)
WBC # FLD AUTO: 17.51 K/UL — HIGH (ref 3.8–10.5)

## 2023-07-15 PROCEDURE — 99233 SBSQ HOSP IP/OBS HIGH 50: CPT

## 2023-07-15 RX ADMIN — Medication 60 MILLIGRAM(S): at 14:57

## 2023-07-15 RX ADMIN — LIDOCAINE 1 PATCH: 4 CREAM TOPICAL at 11:27

## 2023-07-15 RX ADMIN — MUPIROCIN 1 APPLICATION(S): 20 OINTMENT TOPICAL at 05:53

## 2023-07-15 RX ADMIN — Medication 1: at 12:35

## 2023-07-15 RX ADMIN — Medication 1 DROP(S): at 05:53

## 2023-07-15 RX ADMIN — CEFTOLOZANE AND TAZOBACTAM 100 MILLIGRAM(S): 1; .5 INJECTION, POWDER, LYOPHILIZED, FOR SOLUTION INTRAVENOUS at 14:57

## 2023-07-15 RX ADMIN — Medication 60 MILLIGRAM(S): at 22:53

## 2023-07-15 RX ADMIN — POLYETHYLENE GLYCOL 3350 17 GRAM(S): 17 POWDER, FOR SOLUTION ORAL at 11:25

## 2023-07-15 RX ADMIN — SENNA PLUS 2 TABLET(S): 8.6 TABLET ORAL at 22:53

## 2023-07-15 RX ADMIN — MIDODRINE HYDROCHLORIDE 10 MILLIGRAM(S): 2.5 TABLET ORAL at 17:11

## 2023-07-15 RX ADMIN — MIRTAZAPINE 7.5 MILLIGRAM(S): 45 TABLET, ORALLY DISINTEGRATING ORAL at 22:53

## 2023-07-15 RX ADMIN — Medication 60 MILLIGRAM(S): at 05:53

## 2023-07-15 RX ADMIN — Medication 100 MILLIGRAM(S): at 05:52

## 2023-07-15 RX ADMIN — MIDODRINE HYDROCHLORIDE 10 MILLIGRAM(S): 2.5 TABLET ORAL at 05:54

## 2023-07-15 RX ADMIN — Medication 81 MILLIGRAM(S): at 11:26

## 2023-07-15 RX ADMIN — INSULIN GLARGINE 13 UNIT(S): 100 INJECTION, SOLUTION SUBCUTANEOUS at 22:52

## 2023-07-15 RX ADMIN — Medication 50 MILLIGRAM(S): at 11:26

## 2023-07-15 RX ADMIN — PANTOPRAZOLE SODIUM 40 MILLIGRAM(S): 20 TABLET, DELAYED RELEASE ORAL at 05:53

## 2023-07-15 RX ADMIN — Medication 2: at 08:45

## 2023-07-15 RX ADMIN — Medication 1 DROP(S): at 17:10

## 2023-07-15 RX ADMIN — MUPIROCIN 1 APPLICATION(S): 20 OINTMENT TOPICAL at 17:12

## 2023-07-15 RX ADMIN — SODIUM ZIRCONIUM CYCLOSILICATE 5 GRAM(S): 10 POWDER, FOR SUSPENSION ORAL at 11:29

## 2023-07-15 RX ADMIN — CEFTOLOZANE AND TAZOBACTAM 100 MILLIGRAM(S): 1; .5 INJECTION, POWDER, LYOPHILIZED, FOR SOLUTION INTRAVENOUS at 05:52

## 2023-07-15 RX ADMIN — Medication 2.5 MILLIGRAM(S): at 17:12

## 2023-07-15 RX ADMIN — Medication 1: at 17:11

## 2023-07-15 RX ADMIN — Medication 100 MILLIGRAM(S): at 17:10

## 2023-07-15 RX ADMIN — Medication 50 MILLILITER(S): at 18:35

## 2023-07-15 RX ADMIN — CEFTOLOZANE AND TAZOBACTAM 100 MILLIGRAM(S): 1; .5 INJECTION, POWDER, LYOPHILIZED, FOR SOLUTION INTRAVENOUS at 22:50

## 2023-07-15 RX ADMIN — Medication 2.5 MILLIGRAM(S): at 05:53

## 2023-07-15 RX ADMIN — LIDOCAINE 1 PATCH: 4 CREAM TOPICAL at 00:22

## 2023-07-15 NOTE — PROGRESS NOTE ADULT - SUBJECTIVE AND OBJECTIVE BOX
Patient is a 72y Female whom presented to the hospital with esrd on hd     PAST MEDICAL & SURGICAL HISTORY:  HTN (hypertension)      h/o Anxiety attack      Depression      h/o Myocardial infarct 2007      h/o Hepatitis A 1969  currently resolved, no symptoms      Murmur, cardiac      h/o Smoking  quitted 3/2012      Anemia secondary to renal failure      ESRD on dialysis      Falls      Ataxia      Type 2 diabetes mellitus      Peripheral vascular disease, unspecified      CAD (coronary artery disease)      Diabetes      coronary stent 2007      s/p Ovarian cyst removal      s/p surgical removal of benign Skin lesion epigastric area      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 650 milliGRAM(s) Oral every 6 hours  aspirin enteric coated 81 milliGRAM(s) Oral daily  cloNIDine 0.1 milliGRAM(s) Oral two times a day  dextrose 5%. 1000 milliLiter(s) (50 mL/Hr) IV Continuous <Continuous>  dextrose 5%. 1000 milliLiter(s) (100 mL/Hr) IV Continuous <Continuous>  dextrose 50% Injectable 25 Gram(s) IV Push once  dextrose 50% Injectable 12.5 Gram(s) IV Push once  dextrose 50% Injectable 25 Gram(s) IV Push once  diltiazem    Tablet 60 milliGRAM(s) Oral three times a day  dronabinol 2.5 milliGRAM(s) Oral two times a day before meals  glucagon  Injectable 1 milliGRAM(s) IntraMuscular once  imipramine 50 milliGRAM(s) Oral daily  insulin lispro (ADMELOG) corrective regimen sliding scale   SubCutaneous three times a day before meals  metoprolol tartrate 100 milliGRAM(s) Oral two times a day  mirtazapine 7.5 milliGRAM(s) Oral at bedtime  multivitamin 1 Tablet(s) Oral daily  pantoprazole    Tablet 40 milliGRAM(s) Oral before breakfast  senna 2 Tablet(s) Oral at bedtime  sodium chloride 0.45%. 1000 milliLiter(s) (50 mL/Hr) IV Continuous <Continuous>      Allergies    No Known Drug Allergies  latex (Hives)                                  SOCIAL HISTORY:  Denies ETOh,Smoking,     FAMILY HISTORY:  FH: myocardial infarction (Father)    FH: myocardial infarction (Father)    Family history of type 2 diabetes mellitus in brother (Sibling)        REVIEW OF SYSTEMS:    unable to obtained a good review system                                   10.6   17.51 )-----------( 384      ( 15 Jul 2023 08:30 )             33.5       CBC Full  -  ( 15 Jul 2023 08:30 )  WBC Count : 17.51 K/uL  RBC Count : 3.37 M/uL  Hemoglobin : 10.6 g/dL  Hematocrit : 33.5 %  Platelet Count - Automated : 384 K/uL  Mean Cell Volume : 99.4 fl  Mean Cell Hemoglobin : 31.5 pg  Mean Cell Hemoglobin Concentration : 31.6 gm/dL  Auto Neutrophil # : x  Auto Lymphocyte # : x  Auto Monocyte # : x  Auto Eosinophil # : x  Auto Basophil # : x  Auto Neutrophil % : x  Auto Lymphocyte % : x  Auto Monocyte % : x  Auto Eosinophil % : x  Auto Basophil % : x      07-15    127<L>  |  96  |  86<H>  ----------------------------<  172<H>  5.3   |  19<L>  |  8.00<H>    Ca    9.4      15 Jul 2023 08:30  Phos  7.4     07-14  Mg     2.6     07-15        CAPILLARY BLOOD GLUCOSE      POCT Blood Glucose.: 205 mg/dL (15 Jul 2023 08:23)  POCT Blood Glucose.: 210 mg/dL (14 Jul 2023 21:12)  POCT Blood Glucose.: 243 mg/dL (14 Jul 2023 16:34)  POCT Blood Glucose.: 234 mg/dL (14 Jul 2023 11:30)      Vital Signs Last 24 Hrs  T(C): 36.7 (15 Jul 2023 05:56), Max: 36.7 (14 Jul 2023 12:32)  T(F): 98.1 (15 Jul 2023 05:56), Max: 98.1 (14 Jul 2023 12:32)  HR: 78 (15 Jul 2023 05:56) (69 - 90)  BP: 125/87 (15 Jul 2023 05:56) (125/87 - 166/79)  BP(mean): --  RR: 20 (14 Jul 2023 20:01) (20 - 20)  SpO2: 95% (15 Jul 2023 05:56) (92% - 97%)    Parameters below as of 14 Jul 2023 20:01  Patient On (Oxygen Delivery Method): room air        Urinalysis Basic - ( 15 Jul 2023 08:30 )    Color: x / Appearance: x / SG: x / pH: x  Gluc: 172 mg/dL / Ketone: x  / Bili: x / Urobili: x   Blood: x / Protein: x / Nitrite: x   Leuk Esterase: x / RBC: x / WBC x   Sq Epi: x / Non Sq Epi: x / Bacteria: x                PHYSICAL EXAM:    Constitutional: NAD  HEENT: conjunctive   clear   Neck:  No JVD  Respiratory: CTAB  Cardiovascular: S1 and S2  Gastrointestinal: BS+, soft, NT/ND  Extremities: No peripheral edema  Neurological:  no focal deficits  pos fistula

## 2023-07-15 NOTE — PROGRESS NOTE ADULT - ASSESSMENT
72-year-old female with significant past medical history for hypertension, diabetes, dementia, end-stage renal disease on hemodialysis presents to the ED status post unwitnessed fall from [facility].  Patient states that she heard some voices then rolled out of bed.  Patient complaining of right hip pain.  Unknown head trauma.  + Eliquis < from: Xray Femur showed  Fractures including the medial wall of the acetabulum and inferior right pubic ramus Admitted for pain management ,PT/OT

## 2023-07-15 NOTE — PROGRESS NOTE ADULT - SUBJECTIVE AND OBJECTIVE BOX
CHIEF COMPLAINT/ REASON FOR VISIT  .. Patient was seen to address the  issue listed under PROBLEM LIST which is located toward bottom of this note     SHILO KOHLER    PLV 2EAS 201 W1    Allergies    No Known Drug Allergies  latex (Hives)    Intolerances        PAST MEDICAL & SURGICAL HISTORY:  HTN (hypertension)      h/o Anxiety attack      Depression      h/o Myocardial infarct 2007      h/o Hepatitis A 1969  currently resolved, no symptoms      Murmur, cardiac      h/o Smoking  quitted 3/2012      Anemia secondary to renal failure      ESRD on dialysis      Falls      Ataxia      Type 2 diabetes mellitus      Peripheral vascular disease, unspecified      CAD (coronary artery disease)      Diabetes      coronary stent 2007      s/p Ovarian cyst removal      s/p surgical removal of benign Skin lesion epigastric area      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          FAMILY HISTORY:  FH: myocardial infarction (Father)    FH: myocardial infarction (Father)    Family history of type 2 diabetes mellitus in brother (Sibling)        Home Medications:  acetaminophen 325 mg oral tablet: 2 tab(s) orally every 6 hours as needed (02 Jul 2023 15:24)  Admelog SoloStar 100 units/mL injectable solution: injectable 3 times a day (before meals)sliding scale  (02 Jul 2023 15:24)  apixaban 2.5 mg oral tablet: 1 tab(s) orally 2 times a day (02 Jul 2023 15:24)  aspirin 81 mg oral delayed release tablet: 1 tab(s) orally once a day (at bedtime) (02 Jul 2023 15:24)  atorvastatin 20 mg oral tablet: 1 tab(s) orally once a day (at bedtime) (02 Jul 2023 15:24)  bacitracin 500 units/g topical ointment: Apply topically to affected area once a day to right knee abrasion (02 Jul 2023 11:54)  Basaglar KwikPen 100 units/mL subcutaneous solution: 8 international unit(s) subcutaneous once a day (at bedtime) (02 Jul 2023 15:24)  cloNIDine 0.1 mg oral tablet: 1 tab(s) orally 2 times a day (02 Jul 2023 15:24)  DilTIAZem (Eqv-Cardizem CD) 180 mg/24 hours oral capsule, extended release: 1 cap(s) orally once a day (02 Jul 2023 15:24)  imipramine 50 mg oral tablet: 1 tab(s) orally once a day (in the evening) (02 Jul 2023 15:24)  metoprolol tartrate 100 mg oral tablet: 1 tab(s) orally 2 times a day (02 Jul 2023 15:24)  mirtazapine 7.5 mg oral tablet: 1 tab(s) orally once a day (at bedtime) (02 Jul 2023 15:24)  Nephro-Alfie oral tablet: 1 tab(s) orally once a day (02 Jul 2023 15:24)  PANTOPRAZOLE 40MG DR TAB: 1 tab(s) orally once a day (02 Jul 2023 15:24)  senna leaf extract oral tablet: 2 tab(s) orally once a day (at bedtime) (02 Jul 2023 15:24)      MEDICATIONS  (STANDING):  albumin human 25% IVPB 50 milliLiter(s) IV Intermittent <User Schedule>  artificial  tears Solution 1 Drop(s) Both EYES every 12 hours  aspirin  chewable 81 milliGRAM(s) Oral daily  ceftolozane/tazobactam IVPB 150 milliGRAM(s) IV Intermittent every 8 hours  dextrose 5%. 1000 milliLiter(s) (50 mL/Hr) IV Continuous <Continuous>  dextrose 5%. 1000 milliLiter(s) (100 mL/Hr) IV Continuous <Continuous>  dextrose 50% Injectable 12.5 Gram(s) IV Push once  dextrose 50% Injectable 25 Gram(s) IV Push once  dextrose 50% Injectable 25 Gram(s) IV Push once  diltiazem    Tablet 60 milliGRAM(s) Oral three times a day  dronabinol 2.5 milliGRAM(s) Oral two times a day before meals  glucagon  Injectable 1 milliGRAM(s) IntraMuscular once  imipramine 50 milliGRAM(s) Oral daily  insulin glargine Injectable (LANTUS) 13 Unit(s) SubCutaneous at bedtime  insulin lispro (ADMELOG) corrective regimen sliding scale   SubCutaneous three times a day before meals  insulin lispro (ADMELOG) corrective regimen sliding scale   SubCutaneous at bedtime  lidocaine   4% Patch 1 Patch Transdermal daily  metoprolol tartrate 100 milliGRAM(s) Oral two times a day  midodrine. 10 milliGRAM(s) Oral three times a day  mirtazapine 7.5 milliGRAM(s) Oral at bedtime  mupirocin 2% Nasal 1 Application(s) Both Nostrils two times a day  pantoprazole    Tablet 40 milliGRAM(s) Oral before breakfast  polyethylene glycol 3350 17 Gram(s) Oral daily  senna 2 Tablet(s) Oral at bedtime  sodium zirconium cyclosilicate 5 Gram(s) Oral daily    MEDICATIONS  (PRN):  acetaminophen     Tablet .. 650 milliGRAM(s) Oral every 6 hours PRN Temp greater or equal to 38C (100.4F), Mild Pain (1 - 3)  dextrose Oral Gel 15 Gram(s) Oral once PRN Blood Glucose LESS THAN 70 milliGRAM(s)/deciliter      Diet, Pureed:   Moderately Thick Liquids (MODTHICKLIQS) (07-13-23 @ 15:22) [Active]          Vital Signs Last 24 Hrs  T(C): 36.7 (15 Jul 2023 05:56), Max: 36.7 (14 Jul 2023 12:32)  T(F): 98.1 (15 Jul 2023 05:56), Max: 98.1 (14 Jul 2023 12:32)  HR: 78 (15 Jul 2023 05:56) (69 - 90)  BP: 125/87 (15 Jul 2023 05:56) (125/87 - 166/79)  BP(mean): --  RR: 20 (14 Jul 2023 20:01) (20 - 20)  SpO2: 95% (15 Jul 2023 05:56) (92% - 97%)    Parameters below as of 14 Jul 2023 20:01  Patient On (Oxygen Delivery Method): room air          07-13-23 @ 07:01  -  07-14-23 @ 07:00  --------------------------------------------------------  IN: 100 mL / OUT: 500 mL / NET: -400 mL    07-14-23 @ 07:01  -  07-15-23 @ 06:41  --------------------------------------------------------  IN: 460 mL / OUT: 0 mL / NET: 460 mL              LABS:                        11.2   15.59 )-----------( 519      ( 14 Jul 2023 11:40 )             35.4     07-14    134<L>  |  94<L>  |  80<H>  ----------------------------<  236<H>  5.3   |  26  |  7.00<H>    Ca    9.8      14 Jul 2023 11:40  Phos  7.4     07-14  Mg     2.7     07-14    TPro  7.1  /  Alb  2.3<L>  /  TBili  0.5  /  DBili  x   /  AST  30  /  ALT  18  /  AlkPhos  121<H>  07-13      Urinalysis Basic - ( 14 Jul 2023 11:40 )    Color: x / Appearance: x / SG: x / pH: x  Gluc: 236 mg/dL / Ketone: x  / Bili: x / Urobili: x   Blood: x / Protein: x / Nitrite: x   Leuk Esterase: x / RBC: x / WBC x   Sq Epi: x / Non Sq Epi: x / Bacteria: x            WBC:  WBC Count: 15.59 K/uL (07-14 @ 11:40)  WBC Count: 18.67 K/uL (07-13 @ 08:22)  WBC Count: 20.17 K/uL (07-12 @ 05:42)  WBC Count: 26.51 K/uL (07-11 @ 19:12)  WBC Count: 19.09 K/uL (07-11 @ 08:33)      MICROBIOLOGY:  RECENT CULTURES:  07-10 .Blood Blood XXXX XXXX   No growth at 4 days    07-10 .Blood Blood XXXX XXXX   No growth at 4 days                    Sodium:  Sodium: 134 mmol/L (07-14 @ 11:40)  Sodium: 130 mmol/L (07-13 @ 08:22)  Sodium: 133 mmol/L (07-12 @ 05:42)  Sodium: 135 mmol/L (07-11 @ 17:09)  Sodium: 135 mmol/L (07-11 @ 08:33)      7.00 mg/dL 07-14 @ 11:40  8.60 mg/dL 07-13 @ 08:22  7.90 mg/dL 07-12 @ 05:42  6.60 mg/dL 07-11 @ 17:09  9.30 mg/dL 07-11 @ 08:33      Hemoglobin:  Hemoglobin: 11.2 g/dL (07-14 @ 11:40)  Hemoglobin: 11.9 g/dL (07-13 @ 08:22)  Hemoglobin: 11.4 g/dL (07-12 @ 05:42)  Hemoglobin: 11.2 g/dL (07-11 @ 19:12)  Hemoglobin: 12.4 g/dL (07-11 @ 08:33)      Platelets: Platelet Count - Automated: 519 K/uL (07-14 @ 11:40)  Platelet Count - Automated: 451 K/uL (07-13 @ 08:22)  Platelet Count - Automated: 544 K/uL (07-12 @ 05:42)  Platelet Count - Automated: 520 K/uL (07-11 @ 19:12)  Platelet Count - Automated: 490 K/uL (07-11 @ 08:33)      LIVER FUNCTIONS - ( 13 Jul 2023 08:22 )  Alb: 2.3 g/dL / Pro: 7.1 g/dL / ALK PHOS: 121 U/L / ALT: 18 U/L / AST: 30 U/L / GGT: x             Urinalysis Basic - ( 14 Jul 2023 11:40 )    Color: x / Appearance: x / SG: x / pH: x  Gluc: 236 mg/dL / Ketone: x  / Bili: x / Urobili: x   Blood: x / Protein: x / Nitrite: x   Leuk Esterase: x / RBC: x / WBC x   Sq Epi: x / Non Sq Epi: x / Bacteria: x        RADIOLOGY & ADDITIONAL STUDIES:      MICROBIOLOGY:  RECENT CULTURES:  07-10 .Blood Blood XXXX XXXX   No growth at 4 days    07-10 .Blood Blood XXXX XXXX   No growth at 4 days

## 2023-07-15 NOTE — PROGRESS NOTE ADULT - SUBJECTIVE AND OBJECTIVE BOX
Date of Service: 07-15-23 @ 11:00    Patient is a 72y old  Female who presents with a chief complaint of s/p fall (15 Jul 2023 07:52)      INTERVAL HPI/OVERNIGHT EVENTS: Patient seen and examined. NAD. No complaints.    Vital Signs Last 24 Hrs  T(C): 36.7 (15 Jul 2023 05:56), Max: 36.7 (14 Jul 2023 12:32)  T(F): 98.1 (15 Jul 2023 05:56), Max: 98.1 (14 Jul 2023 12:32)  HR: 78 (15 Jul 2023 05:56) (69 - 90)  BP: 125/87 (15 Jul 2023 05:56) (125/87 - 166/79)  BP(mean): --  RR: 20 (14 Jul 2023 20:01) (20 - 20)  SpO2: 95% (15 Jul 2023 05:56) (92% - 97%)    Parameters below as of 14 Jul 2023 20:01  Patient On (Oxygen Delivery Method): room air        07-15    127<L>  |  96  |  86<H>  ----------------------------<  172<H>  5.3   |  19<L>  |  8.00<H>    Ca    9.4      15 Jul 2023 08:30  Phos  7.4     07-14  Mg     2.6     07-15                            10.6   17.51 )-----------( 384      ( 15 Jul 2023 08:30 )             33.5       CAPILLARY BLOOD GLUCOSE      POCT Blood Glucose.: 205 mg/dL (15 Jul 2023 08:23)  POCT Blood Glucose.: 210 mg/dL (14 Jul 2023 21:12)  POCT Blood Glucose.: 243 mg/dL (14 Jul 2023 16:34)  POCT Blood Glucose.: 234 mg/dL (14 Jul 2023 11:30)    Urinalysis Basic - ( 15 Jul 2023 08:30 )    Color: x / Appearance: x / SG: x / pH: x  Gluc: 172 mg/dL / Ketone: x  / Bili: x / Urobili: x   Blood: x / Protein: x / Nitrite: x   Leuk Esterase: x / RBC: x / WBC x   Sq Epi: x / Non Sq Epi: x / Bacteria: x              acetaminophen     Tablet .. 650 milliGRAM(s) Oral every 6 hours PRN  albumin human 25% IVPB 50 milliLiter(s) IV Intermittent <User Schedule>  artificial  tears Solution 1 Drop(s) Both EYES every 12 hours  aspirin  chewable 81 milliGRAM(s) Oral daily  ceftolozane/tazobactam IVPB 150 milliGRAM(s) IV Intermittent every 8 hours  dextrose 5%. 1000 milliLiter(s) IV Continuous <Continuous>  dextrose 5%. 1000 milliLiter(s) IV Continuous <Continuous>  dextrose 50% Injectable 12.5 Gram(s) IV Push once  dextrose 50% Injectable 25 Gram(s) IV Push once  dextrose 50% Injectable 25 Gram(s) IV Push once  dextrose Oral Gel 15 Gram(s) Oral once PRN  diltiazem    Tablet 60 milliGRAM(s) Oral three times a day  dronabinol 2.5 milliGRAM(s) Oral two times a day before meals  glucagon  Injectable 1 milliGRAM(s) IntraMuscular once  imipramine 50 milliGRAM(s) Oral daily  insulin glargine Injectable (LANTUS) 13 Unit(s) SubCutaneous at bedtime  insulin lispro (ADMELOG) corrective regimen sliding scale   SubCutaneous three times a day before meals  insulin lispro (ADMELOG) corrective regimen sliding scale   SubCutaneous at bedtime  lidocaine   4% Patch 1 Patch Transdermal daily  metoprolol tartrate 100 milliGRAM(s) Oral two times a day  midodrine. 10 milliGRAM(s) Oral three times a day  mirtazapine 7.5 milliGRAM(s) Oral at bedtime  mupirocin 2% Nasal 1 Application(s) Both Nostrils two times a day  pantoprazole    Tablet 40 milliGRAM(s) Oral before breakfast  polyethylene glycol 3350 17 Gram(s) Oral daily  senna 2 Tablet(s) Oral at bedtime  sodium zirconium cyclosilicate 5 Gram(s) Oral daily              REVIEW OF SYSTEMS:  CONSTITUTIONAL: No fever, no weight loss, or no fatigue  NECK: No pain, no stiffness  RESPIRATORY: No cough, no wheezing, no chills, no hemoptysis, No shortness of breath  CARDIOVASCULAR: No chest pain, no palpitations, no dizziness, no leg swelling  GASTROINTESTINAL: No abdominal pain. No nausea, no vomiting, no hematemesis; No diarrhea, no constipation. No melena, no hematochezia.  GENITOURINARY: No dysuria, no frequency, no hematuria, no incontinence  NEUROLOGICAL: No headaches, no loss of strength, no numbness, no tremors  SKIN: No itching, no burning  MUSCULOSKELETAL: No joint pain, no swelling; No muscle, no back, no extremity pain  PSYCHIATRIC: No depression, no mood swings,   HEME/LYMPH: No easy bruising, no bleeding gums  ALLERY AND IMMUNOLOGIC: No hives       Consultant(s) Notes Reviewed:  [X] YES  [ ] NO    PHYSICAL EXAM:  GENERAL: NAD  HEAD:  Atraumatic, Normocephalic  EYES: EOMI, PERRLA, conjunctiva and sclera clear  ENMT: No tonsillar erythema, exudates, or enlargement; Moist mucous membranes  NECK: Supple, No JVD  NERVOUS SYSTEM:  Awake & alert  CHEST/LUNG: Clear to auscultation bilaterally; No rales, rhonchi, wheezing,  HEART: Regular rate and rhythm  ABDOMEN: Soft, Nontender, Nondistended; Bowel sounds present  EXTREMITIES:  No clubbing, cyanosis, or edema  LYMPH: No lymphadenopathy noted  SKIN: No rashes      Advanced care planning discussed with patient/family [X] YES   [ ] NO    Advanced care planning discussed with patient/family. Patient's health status was discussed. All appropriate changes have been made regarding patient's end-of-life care. Advanced care planning forms reviewed/discussed/completed.  20 minutes spent.

## 2023-07-15 NOTE — PROGRESS NOTE ADULT - ASSESSMENT
REASON FOR VISIT  .. Management of problems listed below      NOTEWORTHY FINDINGS.     PHYSICAL EXAM    HEENT Unremarkable  atraumatic   RESP Fair air entry  Harsh breath sound   CARDIAC S1 S2 No S3     NO JVD    ABDOMEN No hepatosplenomegaly   PEDAL EDEMA present No calf tenderness  NO rash       GENERAL DATA .     GOC.    . prior  ICU STAY.   .. 7/11/2023   COVID.   ..    BEST PRACTICE ISSUES.    HOB ELEVATN.   .. Yes  DVT PPLX.   .. 7/10 apixa held for thora  .. 7/3/2023 apixa 2.5 x2 restarted as IR feels we should tap fluid only if she develops shortness of breath  ..    7/2/2023 Apixaba 2.5 x2 held for thorac   STRESS ULCER PPLX.   ..      INFECTION PPLX.   ..  7/11 chlorhexidine 2%  .. 7/12 mupirocin 2%   SP SW AMINAH.    ..      7/13/2023 puree thin  DIET.    .. 7/11 npo   ..  7/4/2023 mince moist dc ed   IV fl.  ..     PROCEDURES.  ..   PAST PROCEDURES.    ..   GENERAL DATA .   ALLGY.  ..     latex                     WT.  ..  7/1/2023 54  BMI.  ..    7/1/2023 17     ABGS.  . 7/11/2023 7p 3l 743/34/182     VS/ PO/IO/ VENT/ DRIPS.    7/15/2023 afeb 87 90/50     CC/DOA.        . 7/1/2023 Pt sent from [facility] for unwitnessed fall out of bed.       .  PRESENTATION.   . 7/1/2023 72-year-old female former smoker quit 3/2012  with PMH for hypertension, CAD sp cabg  PVD   sp R-> L bifem - fem BK pop bypass  Fem pop bypass 6/21/2022  Partial ray amputation r great toe 6/22/2022  diabetes, dementia, ESRD on hemodialysis   a fib on eliquis sp recent hosp stay 5/17-5/24/2023     . ENTERORHINOVIRUS INFECTION RESP TRACT 5/17  . PLEURAL EFFUSION SP l THORA 5/18 TRANSUDATE     COURSE   . ACETABULAR Fx Ortho recommended non surgical mgmt  . PL EFFSN Was initially decided to hold off thora unless she develops sob  . LEUKOCYTOSIS 7/11/2023 dw ID will plan thorac to exclude infecn  . ALTERED MENTAL STATE 7/11 Tr to icu poss ic bleed 7/13 tr out of icu      PROBLEMS/ASSESSMENT/RECOMMENDATIONS (A/R).  PULMONARY.  . GAS EXCHANGE.  .. A/R.   .. Monitor pulse ox and target po 90-95%    . PLEURAL EFFSN.  .. RELEVANT PAST HISTORU  .. 5/18/2023 l thorac 1.5 l   .. 5/18 pl fl l 13 m 73 p 5 afb sm (-) g 131 l 102/268 (.38) ph 7.6 pr 2.6/6.4 (.4)  .. Fluid transudate  .. WORKUP   .. CXR 7/1/2023 cxr Mod large l pl effs or lower zone airspace consolidation/atelecatsis   .. CT ch 7/1/2023 Ct ch   .... Decreased mod l effs since 5/17/2023   .. EVENTS  .. 7/2/2023 DW pt and dw son consensus is that they want fluid remived   .. 7/3/2023 dw ir plan changed plan is to tap if she becomes symptimatic   .. 7/3/2023 Thora cancelled as pt is comfortable will pan thora if she becomes symptomatic   .. 7/10/2023 as pt has persistent leukocytosis if no impovement will need thora so apixa 2.5 bid staoppd   . 7/11/2023  thora diagn orderd  . 7/11 Thora deferred  .. A/R  .. 7/14/2023 Will reorer thora if  ID recommends     ID.  . ESBL UTI.7/3  .. WORKUP.  .. W 7/1-7/2-7/3-7/6-7/7-7/9-7/10-7/11-7/12-7/13-7/15/2023   ..W 15 - 14 - 13- 13 - 15- 18 - 19 - 19 - 20- 18  - 17  .. EVENTS.   .. 7/1/2023 Checlk blood and urine cs   .. MICRO.  .. uc 7/3/2023 100 k pseudomonas carbapenem resist    ..  7/3/2023 50-99 Klebs esbl   .. bc 7/10 (-)   .. mrsa 7/12 (+)   .. ABIO.  .. 7/8/2023 meropenem 500 x 3d Dr ALCAZAR dced   .. 7/11 ceftolozane tazobactam 150.3 zerbaxa x 2 do     . PERSISTENT LEUKOCYTOSIS.  .. w 7/11/2023 w 19  .. ct hip r 7/11/2023   .... no hematoma or fluid collectn   .... r acetabular fc 2.1 x 3.4 cm cortical stepoff   .. EVENTS.  .. 7/11/2023 dw id  will plan thorac   .. 7/11 ceftolozane tazobactam 150.3 zerbaxa x 2 do   .. A/R.  .. 7/12/2023 pt now in icu sec possible ic bleed   .. 7/12/2023 Thoracentesis scheduling will be decided by icu     CARDIO.  . HEMODYNAMICS.  .. 7/8/2023 midodrine 10.3 Dr CLIFFORD   .. target map 65 (+)     . CAD.  .. 7/1/2023 ASA 81   .. A/R  .. cont rx    . A fib.  .. 7/1/2023 CARDIZEM 60.3   .. 7/3/2023 apixaba 2.5 x2   .. 7/10 apixa held  .. AR  .. Cont rx  .. apixa to be restarted when cleared by neuro    HEMAT.  .. WORKUP.  .. Hb 7/1-7/2-7/3-7/6-7/9-7/11-7/12/2023 Hb 13 - 11.8 - 11.2 - 11- 12 -12.4- 11.4    .. INR 7/1/2023    .. A/R  .. As pt was on apixaba will monitorserially     RENAL.  . ESRD.  .. WORKUP  .. NA 7/1/2023    .. K 7/1/2023 K 5.1   .. CR 7/1/2023 CR 5  .. HD dependent     ORTHO.  . FALL 7/1/2023.  .. CT head C sp 7/1/2023 CT HC (-)     . FRACTURE ACETABULUM r INFERIOR PUBIC RAMUS r 7/1/2023   .. IMAGING.  .. XR Pelvis and r hip 7/1/2023 XR Fractures r acetabulum and inf r pubic ramus   .. 7/1/2023 ct pelvix   .... comminuted r acetabular fx involving r sup and inf pubic rami medial wallacetabulum sup acetabulum and post wall acetabulum   .... small hematoma r pelvic sidewall   .. ORTHO.  .. 7/1/2023 DW Dr Dias She spoke to Ortho Dr Jenkins group and plan is for nonsurgical management  .. A/R  .. Monitor serial Hb ro bleed     . IC BLEED 7/11.  .. ct head 7/11 cw cta h 7/11   .... 5 mm hyperattenuating curvilinear focus l parietal lobe may be sl increasd from 3 mm on 7/11 520p may represent small focus of hrrge v contrast training in ac infacrt  .. may need transfer tertiary center    .. mr brain no contr 7/12   .... r parietal remote petechial hrrge   .... l parietal petechial hrrge no excluded   .. Follow with neuro     . ALTERED MENTAL STATE 7/11  .. 7/11/2023  Tr to icu poss ic bleed for q h neurochecl   .. 7/13 tr out of icu    . MAIN ISSUES  .. FX acetabulum on conservative rx  . A fib  7/14/2023 doac to be restarted when cleared by neuro   . ESRD   . UTI 7/8/2023 started on meropenem by ID (UTI)   . LEUKOCYTOSIS 7/11/2023 dw ID will plan thorac to exclude infecn  7/11 ceftolozane/tazobactam 150.3 x 2 do thorac deferred   . ALTERED MENTAL STATE 7/11 Tr to icu poss ic bleed tr to tertiary being considered 7/13 tr out of icu      TIME SPENT   . About 36 minutes aggregate care time spent on encounter; activities included   direct patient care, counseling and/or coordinating care reviewing notes, lab data/ imaging , discussion with multidisciplinary team/ patient  /family and explaining in detail risks, benefits, alternatives  of the recommendations     JOSE F LAO 72 f7/1/2023 1948 DR HARRY ALBRIGHT

## 2023-07-15 NOTE — PROGRESS NOTE ADULT - ASSESSMENT
72-year-old female former smoker quit 3/2012  with significant past medical history for hypertension, CAD sp cabg  PVD  diabetes, dementia, end-stage renal disease on hemodialysis   a fib on eliquis sp recent hosp stay 5/17-5/24/2023  admitted 3/7-3/11/2023 and before that 2/22-2/25/2023   . During 5/2023 admission she had   . ENTERORHINOVIRUS INFECTION RESP TRACT 5/17  . PLEURAL EFFUSION SP l THORA 5/18 TRANSUDATE   Patient now  presented to the ED 7/1/2023 status post unwitnessed fall from [facility].  Patient states that she heard some voices then rolled out of bed.  Patient complaining of right hip pain.  Unknown head trauma.  + Ashleedeangelo     She was found to have acetabular fracture which Ortho was contacted and they plan non operative management Pt was admitted to [facility] for bleed as she was on apixaban and was noted to have pl effsn    . 7/1/2023  I was asked to admit pt                      (01 Jul 2023 13:32)      esrd on hd plan for hd today        ANEMIA PLAN:  Anemia of chronic disease:  We will continue epo aiming for a HCT of 32-36 %.   We will monitor Iron stores, B12 and RBC folate .      BP monitoring,continue current midodrine ,    Accuchecks monitoring and insulin sliding scale coverage, no concentrated sweets diet, serial labs and f/up with PMD, monitor HB A 1 C every 3-4 mnth

## 2023-07-15 NOTE — PROGRESS NOTE ADULT - SUBJECTIVE AND OBJECTIVE BOX
Neurology follow up note    SHILO [id number]      Interval History:    Patient feels ok no new complaints.    Allergies    No Known Drug Allergies  latex (Hives)    Intolerances        MEDICATIONS    acetaminophen     Tablet .. 650 milliGRAM(s) Oral every 6 hours PRN  albumin human 25% IVPB 50 milliLiter(s) IV Intermittent <User Schedule>  artificial  tears Solution 1 Drop(s) Both EYES every 12 hours  aspirin  chewable 81 milliGRAM(s) Oral daily  ceftolozane/tazobactam IVPB 150 milliGRAM(s) IV Intermittent every 8 hours  dextrose 5%. 1000 milliLiter(s) IV Continuous <Continuous>  dextrose 5%. 1000 milliLiter(s) IV Continuous <Continuous>  dextrose 50% Injectable 12.5 Gram(s) IV Push once  dextrose 50% Injectable 25 Gram(s) IV Push once  dextrose 50% Injectable 25 Gram(s) IV Push once  dextrose Oral Gel 15 Gram(s) Oral once PRN  diltiazem    Tablet 60 milliGRAM(s) Oral three times a day  dronabinol 2.5 milliGRAM(s) Oral two times a day before meals  glucagon  Injectable 1 milliGRAM(s) IntraMuscular once  imipramine 50 milliGRAM(s) Oral daily  insulin glargine Injectable (LANTUS) 13 Unit(s) SubCutaneous at bedtime  insulin lispro (ADMELOG) corrective regimen sliding scale   SubCutaneous three times a day before meals  insulin lispro (ADMELOG) corrective regimen sliding scale   SubCutaneous at bedtime  lidocaine   4% Patch 1 Patch Transdermal daily  metoprolol tartrate 100 milliGRAM(s) Oral two times a day  midodrine. 10 milliGRAM(s) Oral three times a day  mirtazapine 7.5 milliGRAM(s) Oral at bedtime  mupirocin 2% Nasal 1 Application(s) Both Nostrils two times a day  pantoprazole    Tablet 40 milliGRAM(s) Oral before breakfast  polyethylene glycol 3350 17 Gram(s) Oral daily  senna 2 Tablet(s) Oral at bedtime  sodium zirconium cyclosilicate 5 Gram(s) Oral daily              Vital Signs Last 24 Hrs  T(C): 36.7 (15 Jul 2023 05:56), Max: 36.7 (14 Jul 2023 12:32)  T(F): 98.1 (15 Jul 2023 05:56), Max: 98.1 (14 Jul 2023 12:32)  HR: 78 (15 Jul 2023 05:56) (69 - 90)  BP: 125/87 (15 Jul 2023 05:56) (125/87 - 166/79)  BP(mean): --  RR: 20 (14 Jul 2023 20:01) (20 - 20)  SpO2: 95% (15 Jul 2023 05:56) (92% - 97%)    Parameters below as of 14 Jul 2023 20:01  Patient On (Oxygen Delivery Method): room air      REVIEW OF SYSTEMS:  Could not be obtained secondary to the patient being lethargic, but as per my conversation with spouse, she is wheelchair bound.  For the last few weeks, she has been having limited verbal output.    PHYSICAL EXAMINATION:   HEENT:  Head:  Normocephalic, atraumatic.  Eyes:  No scleral icterus.  Ears:  Hearing hard to evaluate secondary to the patient being lethargic.  NECK:  Supple.  RESPIRATORY:  Air entry bilaterally.  CARDIOVASCULAR:  S1 and S2 heard.  ABDOMEN:  Soft and nontender.  EXTREMITIES:  No clubbing or cyanosis was noted.      NEUROLOGIC:  The patient was arousable to verbal stimuli, opens eyes.  Pupils bilaterally were 4 mm, slight reactivity, on-off blink to visual threat.  To painful stimuli, the patient would say the word "ok"  now putting sentences together.  As per my conversation with spouse, lately she is becoming less and less verbal.  Motor:  With painful stimuli, elevated bilateral upper extremities with a slow drop, would say after 5 seconds, both fell to the ground, bilateral lower extremities were in a contracted position.  The patient does have a right hip flexor, is wheelchair bound, increased tone, slight movement of the feet with stimuli.      LABS:  CBC Full  -  ( 14 Jul 2023 11:40 )  WBC Count : 15.59 K/uL  RBC Count : 3.60 M/uL  Hemoglobin : 11.2 g/dL  Hematocrit : 35.4 %  Platelet Count - Automated : 519 K/uL  Mean Cell Volume : 98.3 fl  Mean Cell Hemoglobin : 31.1 pg  Mean Cell Hemoglobin Concentration : 31.6 gm/dL  Auto Neutrophil # : x  Auto Lymphocyte # : x  Auto Monocyte # : x  Auto Eosinophil # : x  Auto Basophil # : x  Auto Neutrophil % : x  Auto Lymphocyte % : x  Auto Monocyte % : x  Auto Eosinophil % : x  Auto Basophil % : x    Urinalysis Basic - ( 14 Jul 2023 11:40 )    Color: x / Appearance: x / SG: x / pH: x  Gluc: 236 mg/dL / Ketone: x  / Bili: x / Urobili: x   Blood: x / Protein: x / Nitrite: x   Leuk Esterase: x / RBC: x / WBC x   Sq Epi: x / Non Sq Epi: x / Bacteria: x      07-14    134<L>  |  94<L>  |  80<H>  ----------------------------<  236<H>  5.3   |  26  |  7.00<H>    Ca    9.8      14 Jul 2023 11:40  Phos  7.4     07-14  Mg     2.7     07-14      Hemoglobin A1C:       Vitamin B12         RADIOLOGY      ANALYSIS AND PLAN:  This is a 72-year-old with episode of unresponsiveness and altered mental status.  For episode of unresponsiveness and altered mental status, suspect most likely metabolic encephalopathy secondary to underlying infectious type process, possibly urinary tract, also questionable the patient had an event a few weeks ago.  As per previous notes a few months ago, the patient was more verbal and interactive, but as per my conversation with spouse, he states for the last few weeks, she has been speaking less with less interaction, questionable any type of new central nervous system event occurred a few weeks ago.  For history of underlying mild cognitive impairment versus subtle dementia secondary to the patient appears to be advanced, would recommend supportive therapy.  For history of hypertension, monitor systolic blood pressure, avoid hypotension if possible.  Please maintain a systolic blood pressure between 140 and 100.  mri reviewed no significant changes if needed cleared for AC risk vs benefits   overall more awake and interactive today 7/15    Spoke with spouse, Damir, at [phone] 7/12 called today went to [employer] 7/13 902am 7/14 911 am    Greater than 45 minutes of time was spent with the patient, plan of care, reviewing data, with greater than 50% of the visit was spent counseling and/or coordinating care with multidisciplinary healthcare team

## 2023-07-16 LAB
CULTURE RESULTS: SIGNIFICANT CHANGE UP
CULTURE RESULTS: SIGNIFICANT CHANGE UP
HCT VFR BLD CALC: 33.7 % — LOW (ref 34.5–45)
HGB BLD-MCNC: 10.8 G/DL — LOW (ref 11.5–15.5)
MCHC RBC-ENTMCNC: 31.9 PG — SIGNIFICANT CHANGE UP (ref 27–34)
MCHC RBC-ENTMCNC: 32 GM/DL — SIGNIFICANT CHANGE UP (ref 32–36)
MCV RBC AUTO: 99.4 FL — SIGNIFICANT CHANGE UP (ref 80–100)
NRBC # BLD: 0 /100 WBCS — SIGNIFICANT CHANGE UP (ref 0–0)
PLATELET # BLD AUTO: 441 K/UL — HIGH (ref 150–400)
RBC # BLD: 3.39 M/UL — LOW (ref 3.8–5.2)
RBC # FLD: 15.7 % — HIGH (ref 10.3–14.5)
SPECIMEN SOURCE: SIGNIFICANT CHANGE UP
SPECIMEN SOURCE: SIGNIFICANT CHANGE UP
WBC # BLD: 16.04 K/UL — HIGH (ref 3.8–10.5)
WBC # FLD AUTO: 16.04 K/UL — HIGH (ref 3.8–10.5)

## 2023-07-16 PROCEDURE — 99233 SBSQ HOSP IP/OBS HIGH 50: CPT

## 2023-07-16 RX ORDER — MIDODRINE HYDROCHLORIDE 2.5 MG/1
10 TABLET ORAL THREE TIMES A DAY
Refills: 0 | Status: DISCONTINUED | OUTPATIENT
Start: 2023-07-16 | End: 2023-07-17

## 2023-07-16 RX ADMIN — LIDOCAINE 1 PATCH: 4 CREAM TOPICAL at 23:00

## 2023-07-16 RX ADMIN — Medication 100 MILLIGRAM(S): at 06:10

## 2023-07-16 RX ADMIN — Medication 60 MILLIGRAM(S): at 14:25

## 2023-07-16 RX ADMIN — Medication 1: at 17:01

## 2023-07-16 RX ADMIN — Medication 81 MILLIGRAM(S): at 11:41

## 2023-07-16 RX ADMIN — Medication 100 MILLIGRAM(S): at 17:02

## 2023-07-16 RX ADMIN — Medication 50 MILLIGRAM(S): at 11:41

## 2023-07-16 RX ADMIN — Medication 60 MILLIGRAM(S): at 21:41

## 2023-07-16 RX ADMIN — Medication 1 DROP(S): at 17:02

## 2023-07-16 RX ADMIN — Medication 60 MILLIGRAM(S): at 06:09

## 2023-07-16 RX ADMIN — MIDODRINE HYDROCHLORIDE 10 MILLIGRAM(S): 2.5 TABLET ORAL at 06:11

## 2023-07-16 RX ADMIN — SENNA PLUS 2 TABLET(S): 8.6 TABLET ORAL at 21:41

## 2023-07-16 RX ADMIN — LIDOCAINE 1 PATCH: 4 CREAM TOPICAL at 11:40

## 2023-07-16 RX ADMIN — Medication 2.5 MILLIGRAM(S): at 17:00

## 2023-07-16 RX ADMIN — CEFTOLOZANE AND TAZOBACTAM 100 MILLIGRAM(S): 1; .5 INJECTION, POWDER, LYOPHILIZED, FOR SOLUTION INTRAVENOUS at 06:08

## 2023-07-16 RX ADMIN — SODIUM ZIRCONIUM CYCLOSILICATE 5 GRAM(S): 10 POWDER, FOR SUSPENSION ORAL at 11:41

## 2023-07-16 RX ADMIN — POLYETHYLENE GLYCOL 3350 17 GRAM(S): 17 POWDER, FOR SOLUTION ORAL at 11:41

## 2023-07-16 RX ADMIN — Medication 1: at 21:40

## 2023-07-16 RX ADMIN — Medication 1 DROP(S): at 06:08

## 2023-07-16 RX ADMIN — PANTOPRAZOLE SODIUM 40 MILLIGRAM(S): 20 TABLET, DELAYED RELEASE ORAL at 08:19

## 2023-07-16 RX ADMIN — MIRTAZAPINE 7.5 MILLIGRAM(S): 45 TABLET, ORALLY DISINTEGRATING ORAL at 21:42

## 2023-07-16 RX ADMIN — Medication 1: at 11:41

## 2023-07-16 RX ADMIN — MUPIROCIN 1 APPLICATION(S): 20 OINTMENT TOPICAL at 06:12

## 2023-07-16 RX ADMIN — LIDOCAINE 1 PATCH: 4 CREAM TOPICAL at 19:59

## 2023-07-16 RX ADMIN — INSULIN GLARGINE 13 UNIT(S): 100 INJECTION, SOLUTION SUBCUTANEOUS at 21:39

## 2023-07-16 RX ADMIN — CEFTOLOZANE AND TAZOBACTAM 100 MILLIGRAM(S): 1; .5 INJECTION, POWDER, LYOPHILIZED, FOR SOLUTION INTRAVENOUS at 21:39

## 2023-07-16 RX ADMIN — MUPIROCIN 1 APPLICATION(S): 20 OINTMENT TOPICAL at 17:02

## 2023-07-16 RX ADMIN — CEFTOLOZANE AND TAZOBACTAM 100 MILLIGRAM(S): 1; .5 INJECTION, POWDER, LYOPHILIZED, FOR SOLUTION INTRAVENOUS at 14:25

## 2023-07-16 RX ADMIN — Medication 2.5 MILLIGRAM(S): at 08:18

## 2023-07-16 NOTE — PROGRESS NOTE ADULT - ASSESSMENT
72-year-old female former smoker quit 3/2012  with significant past medical history for hypertension, CAD sp cabg  PVD  diabetes, dementia, end-stage renal disease on hemodialysis   a fib on eliquis sp recent hosp stay 5/17-5/24/2023  admitted 3/7-3/11/2023 and before that 2/22-2/25/2023   . During 5/2023 admission she had   . ENTERORHINOVIRUS INFECTION RESP TRACT 5/17  . PLEURAL EFFUSION SP l THORA 5/18 TRANSUDATE   Patient now  presented to the ED 7/1/2023 status post unwitnessed fall from [facility].  Patient states that she heard some voices then rolled out of bed.  Patient complaining of right hip pain.  Unknown head trauma.  + Ashleedeangelo     She was found to have acetabular fracture which Ortho was contacted and they plan non operative management Pt was admitted to [facility] for bleed as she was on apixaban and was noted to have pl effsn    . 7/1/2023  I was asked to admit pt                      (01 Jul 2023 13:32)      esrd on hd         ANEMIA PLAN:  Anemia of chronic disease:  We will continue epo aiming for a HCT of 32-36 %.   We will monitor Iron stores, B12 and RBC folate .      BP monitoring,continue current midodrine ,     monitoring and insulin sliding scale coverage, no concentrated sweets diet, serial labs and f/up with PMD, monitor HB A 1 C every 3-4 mnth

## 2023-07-16 NOTE — PROGRESS NOTE ADULT - SUBJECTIVE AND OBJECTIVE BOX
CHIEF COMPLAINT/ REASON FOR VISIT  .. Patient was seen to address the  issue listed under PROBLEM LIST which is located toward bottom of this note     SHILO KOHLER    [facility] 2EAS 201 W1    Allergies    No Known Drug Allergies  latex (Hives)    Intolerances        PAST MEDICAL & SURGICAL HISTORY:  HTN (hypertension)      h/o Anxiety attack      Depression      h/o Myocardial infarct 2007      h/o Hepatitis A 1969  currently resolved, no symptoms      Murmur, cardiac      h/o Smoking  quitted 3/2012      Anemia secondary to renal failure      ESRD on dialysis      Falls      Ataxia      Type 2 diabetes mellitus      Peripheral vascular disease, unspecified      CAD (coronary artery disease)      Diabetes      coronary stent 2007      s/p Ovarian cyst removal      s/p surgical removal of benign Skin lesion epigastric area      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          FAMILY HISTORY:  FH: myocardial infarction (Father)    FH: myocardial infarction (Father)    Family history of type 2 diabetes mellitus in brother (Sibling)        Home Medications:  acetaminophen 325 mg oral tablet: 2 tab(s) orally every 6 hours as needed (02 Jul 2023 15:24)  Admelog SoloStar 100 units/mL injectable solution: injectable 3 times a day (before meals)sliding scale  (02 Jul 2023 15:24)  apixaban 2.5 mg oral tablet: 1 tab(s) orally 2 times a day (02 Jul 2023 15:24)  aspirin 81 mg oral delayed release tablet: 1 tab(s) orally once a day (at bedtime) (02 Jul 2023 15:24)  atorvastatin 20 mg oral tablet: 1 tab(s) orally once a day (at bedtime) (02 Jul 2023 15:24)  bacitracin 500 units/g topical ointment: Apply topically to affected area once a day to right knee abrasion (02 Jul 2023 11:54)  Basaglar KwikPen 100 units/mL subcutaneous solution: 8 international unit(s) subcutaneous once a day (at bedtime) (02 Jul 2023 15:24)  cloNIDine 0.1 mg oral tablet: 1 tab(s) orally 2 times a day (02 Jul 2023 15:24)  DilTIAZem (Eqv-Cardizem CD) 180 mg/24 hours oral capsule, extended release: 1 cap(s) orally once a day (02 Jul 2023 15:24)  imipramine 50 mg oral tablet: 1 tab(s) orally once a day (in the evening) (02 Jul 2023 15:24)  metoprolol tartrate 100 mg oral tablet: 1 tab(s) orally 2 times a day (02 Jul 2023 15:24)  mirtazapine 7.5 mg oral tablet: 1 tab(s) orally once a day (at bedtime) (02 Jul 2023 15:24)  Nephro-Alfie oral tablet: 1 tab(s) orally once a day (02 Jul 2023 15:24)  PANTOPRAZOLE 40MG DR TAB: 1 tab(s) orally once a day (02 Jul 2023 15:24)  senna leaf extract oral tablet: 2 tab(s) orally once a day (at bedtime) (02 Jul 2023 15:24)      MEDICATIONS  (STANDING):  albumin human 25% IVPB 50 milliLiter(s) IV Intermittent <User Schedule>  artificial  tears Solution 1 Drop(s) Both EYES every 12 hours  aspirin  chewable 81 milliGRAM(s) Oral daily  ceftolozane/tazobactam IVPB 150 milliGRAM(s) IV Intermittent every 8 hours  dextrose 5%. 1000 milliLiter(s) (50 mL/Hr) IV Continuous <Continuous>  dextrose 5%. 1000 milliLiter(s) (100 mL/Hr) IV Continuous <Continuous>  dextrose 50% Injectable 12.5 Gram(s) IV Push once  dextrose 50% Injectable 25 Gram(s) IV Push once  dextrose 50% Injectable 25 Gram(s) IV Push once  diltiazem    Tablet 60 milliGRAM(s) Oral three times a day  dronabinol 2.5 milliGRAM(s) Oral two times a day before meals  glucagon  Injectable 1 milliGRAM(s) IntraMuscular once  imipramine 50 milliGRAM(s) Oral daily  insulin glargine Injectable (LANTUS) 13 Unit(s) SubCutaneous at bedtime  insulin lispro (ADMELOG) corrective regimen sliding scale   SubCutaneous three times a day before meals  insulin lispro (ADMELOG) corrective regimen sliding scale   SubCutaneous at bedtime  lidocaine   4% Patch 1 Patch Transdermal daily  metoprolol tartrate 100 milliGRAM(s) Oral two times a day  midodrine. 10 milliGRAM(s) Oral three times a day  mirtazapine 7.5 milliGRAM(s) Oral at bedtime  mupirocin 2% Nasal 1 Application(s) Both Nostrils two times a day  pantoprazole    Tablet 40 milliGRAM(s) Oral before breakfast  polyethylene glycol 3350 17 Gram(s) Oral daily  senna 2 Tablet(s) Oral at bedtime  sodium zirconium cyclosilicate 5 Gram(s) Oral daily    MEDICATIONS  (PRN):  acetaminophen     Tablet .. 650 milliGRAM(s) Oral every 6 hours PRN Temp greater or equal to 38C (100.4F), Mild Pain (1 - 3)  dextrose Oral Gel 15 Gram(s) Oral once PRN Blood Glucose LESS THAN 70 milliGRAM(s)/deciliter      Diet, Pureed:   Moderately Thick Liquids (MODTHICKLIQS) (07-13-23 @ 15:22) [Active]          Vital Signs Last 24 Hrs  T(C): 37.1 (16 Jul 2023 05:17), Max: 37.1 (16 Jul 2023 05:17)  T(F): 98.7 (16 Jul 2023 05:17), Max: 98.7 (16 Jul 2023 05:17)  HR: 83 (16 Jul 2023 05:17) (66 - 87)  BP: 123/60 (16 Jul 2023 05:17) (92/59 - 157/67)  BP(mean): --  RR: 18 (16 Jul 2023 05:17) (18 - 18)  SpO2: 95% (16 Jul 2023 05:17) (94% - 99%)    Parameters below as of 16 Jul 2023 05:17  Patient On (Oxygen Delivery Method): nasal cannula          07-14-23 @ 07:01  -  07-15-23 @ 07:00  --------------------------------------------------------  IN: 460 mL / OUT: 0 mL / NET: 460 mL    07-15-23 @ 07:01  -  07-16-23 @ 06:34  --------------------------------------------------------  IN: 200 mL / OUT: 1000 mL / NET: -800 mL              LABS:                        10.6   17.51 )-----------( 384      ( 15 Jul 2023 08:30 )             33.5     07-15    127<L>  |  96  |  86<H>  ----------------------------<  172<H>  5.3   |  19<L>  |  8.00<H>    Ca    9.4      15 Jul 2023 08:30  Phos  7.4     07-14  Mg     2.6     07-15        Urinalysis Basic - ( 15 Jul 2023 08:30 )    Color: x / Appearance: x / SG: x / pH: x  Gluc: 172 mg/dL / Ketone: x  / Bili: x / Urobili: x   Blood: x / Protein: x / Nitrite: x   Leuk Esterase: x / RBC: x / WBC x   Sq Epi: x / Non Sq Epi: x / Bacteria: x            WBC:  WBC Count: 17.51 K/uL (07-15 @ 08:30)  WBC Count: 15.59 K/uL (07-14 @ 11:40)  WBC Count: 18.67 K/uL (07-13 @ 08:22)      MICROBIOLOGY:  RECENT CULTURES:  07-10 .Blood Blood XXXX XXXX   No growth at 5 days    07-10 .Blood Blood XXXX XXXX   No growth at 5 days                    Sodium:  Sodium: 127 mmol/L (07-15 @ 08:30)  Sodium: 134 mmol/L (07-14 @ 11:40)  Sodium: 130 mmol/L (07-13 @ 08:22)      8.00 mg/dL 07-15 @ 08:30  7.00 mg/dL 07-14 @ 11:40  8.60 mg/dL 07-13 @ 08:22      Hemoglobin:  Hemoglobin: 10.6 g/dL (07-15 @ 08:30)  Hemoglobin: 11.2 g/dL (07-14 @ 11:40)  Hemoglobin: 11.9 g/dL (07-13 @ 08:22)      Platelets: Platelet Count - Automated: 384 K/uL (07-15 @ 08:30)  Platelet Count - Automated: 519 K/uL (07-14 @ 11:40)  Platelet Count - Automated: 451 K/uL (07-13 @ 08:22)          Urinalysis Basic - ( 15 Jul 2023 08:30 )    Color: x / Appearance: x / SG: x / pH: x  Gluc: 172 mg/dL / Ketone: x  / Bili: x / Urobili: x   Blood: x / Protein: x / Nitrite: x   Leuk Esterase: x / RBC: x / WBC x   Sq Epi: x / Non Sq Epi: x / Bacteria: x        RADIOLOGY & ADDITIONAL STUDIES:      MICROBIOLOGY:  RECENT CULTURES:  07-10 .Blood Blood XXXX XXXX   No growth at 5 days    07-10 .Blood Blood XXXX XXXX   No growth at 5 days

## 2023-07-16 NOTE — PROGRESS NOTE ADULT - ASSESSMENT
fx rt pelvis with hematoma- hold eliquis  s/p fall  ashd - s/p cabg  s/p mi  s/p stent  esrd - on hd  dm2  hypertension  paf  depression  pvd  discussed with spouse 7/1  repeat xray chest follow up effusion 7/11  hypotension - transsferred to icu- bp improved 7/13  pt is moved to regular floor 7/16

## 2023-07-16 NOTE — SWALLOW BEDSIDE ASSESSMENT ADULT - SWALLOW EVAL: RECOMMENDED DIET
Puree with Thin liquids, as tolerated
soft and bite sized/thin liquids
Minced and moist solids, moderately thick liquids

## 2023-07-16 NOTE — PROGRESS NOTE ADULT - SUBJECTIVE AND OBJECTIVE BOX
Patient is a 72y Female whom presented to the hospital with esrd on hd     PAST MEDICAL & SURGICAL HISTORY:  HTN (hypertension)      h/o Anxiety attack      Depression      h/o Myocardial infarct 2007      h/o Hepatitis A 1969  currently resolved, no symptoms      Murmur, cardiac      h/o Smoking  quitted 3/2012      Anemia secondary to renal failure      ESRD on dialysis      Falls      Ataxia      Type 2 diabetes mellitus      Peripheral vascular disease, unspecified      CAD (coronary artery disease)      Diabetes      coronary stent 2007      s/p Ovarian cyst removal      s/p surgical removal of benign Skin lesion epigastric area      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 650 milliGRAM(s) Oral every 6 hours  aspirin enteric coated 81 milliGRAM(s) Oral daily  cloNIDine 0.1 milliGRAM(s) Oral two times a day  dextrose 5%. 1000 milliLiter(s) (50 mL/Hr) IV Continuous <Continuous>  dextrose 5%. 1000 milliLiter(s) (100 mL/Hr) IV Continuous <Continuous>  dextrose 50% Injectable 25 Gram(s) IV Push once  dextrose 50% Injectable 12.5 Gram(s) IV Push once  dextrose 50% Injectable 25 Gram(s) IV Push once  diltiazem    Tablet 60 milliGRAM(s) Oral three times a day  dronabinol 2.5 milliGRAM(s) Oral two times a day before meals  glucagon  Injectable 1 milliGRAM(s) IntraMuscular once  imipramine 50 milliGRAM(s) Oral daily  insulin lispro (ADMELOG) corrective regimen sliding scale   SubCutaneous three times a day before meals  metoprolol tartrate 100 milliGRAM(s) Oral two times a day  mirtazapine 7.5 milliGRAM(s) Oral at bedtime  multivitamin 1 Tablet(s) Oral daily  pantoprazole    Tablet 40 milliGRAM(s) Oral before breakfast  senna 2 Tablet(s) Oral at bedtime  sodium chloride 0.45%. 1000 milliLiter(s) (50 mL/Hr) IV Continuous <Continuous>      Allergies    No Known Drug Allergies  latex (Hives)                                  SOCIAL HISTORY:  Denies ETOh,Smoking,     FAMILY HISTORY:  FH: myocardial infarction (Father)    FH: myocardial infarction (Father)    Family history of type 2 diabetes mellitus in brother (Sibling)        REVIEW OF SYSTEMS:    unable to obtained a good review system                                                     10.8   16.04 )-----------( 441      ( 16 Jul 2023 11:04 )             33.7       CBC Full  -  ( 16 Jul 2023 11:04 )  WBC Count : 16.04 K/uL  RBC Count : 3.39 M/uL  Hemoglobin : 10.8 g/dL  Hematocrit : 33.7 %  Platelet Count - Automated : 441 K/uL  Mean Cell Volume : 99.4 fl  Mean Cell Hemoglobin : 31.9 pg  Mean Cell Hemoglobin Concentration : 32.0 gm/dL  Auto Neutrophil # : x  Auto Lymphocyte # : x  Auto Monocyte # : x  Auto Eosinophil # : x  Auto Basophil # : x  Auto Neutrophil % : x  Auto Lymphocyte % : x  Auto Monocyte % : x  Auto Eosinophil % : x  Auto Basophil % : x      07-16    133<L>  |  98  |  53<H>  ----------------------------<  166<H>  4.9   |  22  |  5.50<H>    Ca    9.5      16 Jul 2023 11:04  Mg     2.4     07-16        CAPILLARY BLOOD GLUCOSE      POCT Blood Glucose.: 172 mg/dL (16 Jul 2023 11:29)  POCT Blood Glucose.: 116 mg/dL (16 Jul 2023 07:35)  POCT Blood Glucose.: 176 mg/dL (15 Jul 2023 22:36)  POCT Blood Glucose.: 186 mg/dL (15 Jul 2023 16:52)      Vital Signs Last 24 Hrs  T(C): 35.9 (16 Jul 2023 11:25), Max: 37.1 (16 Jul 2023 05:17)  T(F): 96.6 (16 Jul 2023 11:25), Max: 98.7 (16 Jul 2023 05:17)  HR: 72 (16 Jul 2023 11:25) (66 - 87)  BP: 165/80 (16 Jul 2023 11:25) (92/59 - 165/80)  BP(mean): --  RR: 12 (16 Jul 2023 11:25) (12 - 18)  SpO2: 99% (16 Jul 2023 11:25) (94% - 99%)    Parameters below as of 16 Jul 2023 11:25  Patient On (Oxygen Delivery Method): room air        Urinalysis Basic - ( 16 Jul 2023 11:04 )    Color: x / Appearance: x / SG: x / pH: x  Gluc: 166 mg/dL / Ketone: x  / Bili: x / Urobili: x   Blood: x / Protein: x / Nitrite: x   Leuk Esterase: x / RBC: x / WBC x   Sq Epi: x / Non Sq Epi: x / Bacteria: x                  PHYSICAL EXAM:    Constitutional: NAD  HEENT: conjunctive   clear   Neck:  No JVD  Respiratory: CTAB  Cardiovascular: S1 and S2  Gastrointestinal: BS+, soft, NT/ND  Extremities: No peripheral edema  Neurological:  no focal deficits  pos fistula

## 2023-07-16 NOTE — PROGRESS NOTE ADULT - ASSESSMENT
72-year-old female with significant past medical history for hypertension, diabetes, dementia, end-stage renal disease on hemodialysis presents to the ED status post unwitnessed fall from [facility].  Patient states that she heard some voices then rolled out of bed.  Patient complaining of right hip pain.  Unknown head trauma.  + Eliquis < from: Xray Femur showed  Fractures including the medial wall of the acetabulum and inferior right pubic ramus Admitted for pain management ,PT/OT

## 2023-07-16 NOTE — SWALLOW BEDSIDE ASSESSMENT ADULT - SWALLOW EVAL: CURRENT DIET
NPO except meds per MD order
Minced and moist with mildly thick liquids per MDO
puree/moderately thick liquids

## 2023-07-16 NOTE — PROGRESS NOTE ADULT - SUBJECTIVE AND OBJECTIVE BOX
Neurology follow up note    SHILO [id number]      Interval History:    Patient feels ok no new complaints.    Allergies    No Known Drug Allergies  latex (Hives)    Intolerances        MEDICATIONS      Vital Signs Last 24 Hrs  T(C): 35.9 (16 Jul 2023 11:25), Max: 37.1 (16 Jul 2023 05:17)  T(F): 96.6 (16 Jul 2023 11:25), Max: 98.7 (16 Jul 2023 05:17)  HR: 72 (16 Jul 2023 11:25) (66 - 87)  BP: 165/80 (16 Jul 2023 11:25) (92/59 - 165/80)  BP(mean): --  RR: 12 (16 Jul 2023 11:25) (12 - 18)  SpO2: 99% (16 Jul 2023 11:25) (94% - 99%)    REVIEW OF SYSTEMS:  Could not be obtained secondary to the patient being lethargic, but as per my conversation with spouse, she is wheelchair bound.  For the last few weeks, she has been having limited verbal output.    PHYSICAL EXAMINATION:   HEENT:  Head:  Normocephalic, atraumatic.  Eyes:  No scleral icterus.  Ears:  Hearing hard to evaluate secondary to the patient being lethargic.  NECK:  Supple.  RESPIRATORY:  Air entry bilaterally.  CARDIOVASCULAR:  S1 and S2 heard.  ABDOMEN:  Soft and nontender.  EXTREMITIES:  No clubbing or cyanosis was noted.      NEUROLOGIC:  The patient was arousable to verbal stimuli, opens eyes.  Pupils bilaterally were 4 mm, slight reactivity, on-off blink to visual threat.  To painful stimuli, the patient would say the word "ok"  now putting sentences together.  As per my conversation with spouse, lately she is becoming less and less verbal.  Motor:  With painful stimuli, elevated bilateral upper extremities with a slow drop, would say after 5 seconds, both fell to the ground, bilateral lower extremities were in a contracted position.  The patient does have a right hip flexor, is wheelchair bound, increased tone, slight movement of the feet with stimuli.    07-16    133<L>  |  98  |  53<H>  ----------------------------<  166<H>  4.9   |  22  |  5.50<H>    Ca    9.5      16 Jul 2023 11:04  Mg     2.4     07-16                            10.8   16.04 )-----------( 441      ( 16 Jul 2023 11:04 )             33.7     ANALYSIS AND PLAN:  This is a 72-year-old with episode of unresponsiveness and altered mental status.  For episode of unresponsiveness and altered mental status, suspect most likely metabolic encephalopathy secondary to underlying infectious type process, possibly urinary tract, also questionable the patient had an event a few weeks ago.  As per previous notes a few months ago, the patient was more verbal and interactive, but as per my conversation with spouse, he states for the last few weeks, she has been speaking less with less interaction, questionable any type of new central nervous system event occurred a few weeks ago.  For history of underlying mild cognitive impairment versus subtle dementia secondary to the patient appears to be advanced, would recommend supportive therapy.  For history of hypertension, monitor systolic blood pressure, avoid hypotension if possible.  Please maintain a systolic blood pressure between 140 and 100.  mri reviewed no significant changes if needed cleared for AC risk vs benefits     Spoke with spouse, Damir, at [phone] 7/12 called today went to voicemail 7/13 902am 7/14 911 am    Greater than 45 minutes of time was spent with the patient, plan of care, reviewing data, with greater than 50% of the visit was spent counseling and/or coordinating care with multidisciplinary healthcare team

## 2023-07-16 NOTE — SWALLOW BEDSIDE ASSESSMENT ADULT - COMMENTS
per charting - Patient is a 72y old  Female who presents with a chief complaint of s/p fall   Pt known to this service from bedside eval 7/13 at which time puree/thin liquid was recommended. Pt currently on puree/moderately thick liquid diet. Seen for f/u reassessment this date as recommended at initial encounter. Pt is alert, follows commands and is cooperative with assessment.

## 2023-07-16 NOTE — PROGRESS NOTE ADULT - SUBJECTIVE AND OBJECTIVE BOX
CAPILLARY BLOOD GLUCOSE      POCT Blood Glucose.: 171 mg/dL (16 Jul 2023 16:42)  POCT Blood Glucose.: 172 mg/dL (16 Jul 2023 11:29)  POCT Blood Glucose.: 116 mg/dL (16 Jul 2023 07:35)  POCT Blood Glucose.: 176 mg/dL (15 Jul 2023 22:36)      Vital Signs Last 24 Hrs  T(C): 35.9 (16 Jul 2023 11:25), Max: 37.1 (16 Jul 2023 05:17)  T(F): 96.6 (16 Jul 2023 11:25), Max: 98.7 (16 Jul 2023 05:17)  HR: 87 (16 Jul 2023 16:52) (66 - 92)  BP: 161/78 (16 Jul 2023 16:52) (104/77 - 165/80)  BP(mean): --  RR: 18 (16 Jul 2023 16:52) (12 - 18)  SpO2: 95% (16 Jul 2023 16:52) (91% - 99%)    Parameters below as of 16 Jul 2023 16:52  Patient On (Oxygen Delivery Method): room air        General: WN/WD NAD  Respiratory: CTA B/L  CV: RRR, S1S2, no murmurs, rubs or gallops  Abdominal: Soft, NT, ND +BS, Last BM  Extremities: No edema, + peripheral pulses     07-16    133<L>  |  98  |  53<H>  ----------------------------<  166<H>  4.9   |  22  |  5.50<H>    Ca    9.5      16 Jul 2023 11:04  Mg     2.4     07-16        dextrose 50% Injectable 12.5 Gram(s) IV Push once  dextrose 50% Injectable 25 Gram(s) IV Push once  dextrose 50% Injectable 25 Gram(s) IV Push once  dextrose Oral Gel 15 Gram(s) Oral once PRN  glucagon  Injectable 1 milliGRAM(s) IntraMuscular once  insulin glargine Injectable (LANTUS) 13 Unit(s) SubCutaneous at bedtime  insulin lispro (ADMELOG) corrective regimen sliding scale   SubCutaneous three times a day before meals  insulin lispro (ADMELOG) corrective regimen sliding scale   SubCutaneous at bedtime

## 2023-07-16 NOTE — PROGRESS NOTE ADULT - SUBJECTIVE AND OBJECTIVE BOX
Patient is a 72y Female with a known history of :  Hip fracture [S72.009A]    Closed pelvic fracture [S32.9XXA]    Pubic ramus fracture [S32.599A]    Right acetabular fracture [S32.401A]    ESRD on dialysis [N18.6]    Prophylactic measure [Z29.9]    HTN (hypertension) [I10]    Fall [W19.XXXA]    Pleural effusion [J90]    Acute febrile illness [R50.9]    Type 2 diabetes mellitus [E11.9]    Sacral decubitus ulcer, stage IV [L89.154]    ICH (intracerebral hemorrhage) [I61.9]    Encephalopathy, unspecified [G93.40]      HPI:  72-year-old female with significant past medical history for hypertension, diabetes, dementia, end-stage renal disease on hemodialysis presents to the ED status post unwitnessed fall from [facility].  Patient states that she heard some voices then rolled out of bed.  Patient complaining of right hip pain.  Unknown head trauma.  + Eliquis < from: Xray Femur showed  Fractures including the medial wall of the acetabulum and inferior right pubic ramus Admitted for pain management ,PT/OT        (01 Jul 2023 15:24)      REVIEW OF SYSTEMS:    CONSTITUTIONAL: No fever, weight loss, or fatigue  EYES: No eye pain, visual disturbances, or discharge  ENMT:  No difficulty hearing, tinnitus, vertigo; No sinus or throat pain  NECK: No pain or stiffness  BREASTS: No pain, masses, or nipple discharge  RESPIRATORY: No cough, wheezing, chills or hemoptysis; No shortness of breath  CARDIOVASCULAR: No chest pain, palpitations, dizziness, or leg swelling  GASTROINTESTINAL: No abdominal or epigastric pain. No nausea, vomiting, or hematemesis; No diarrhea or constipation. No melena or hematochezia.  GENITOURINARY: No dysuria, frequency, hematuria, or incontinence  NEUROLOGICAL: No headaches, memory loss, loss of strength, numbness, or tremors  SKIN: No itching, burning, rashes, or lesions   LYMPH NODES: No enlarged glands  ENDOCRINE: No heat or cold intolerance; No hair loss  MUSCULOSKELETAL: No joint pain or swelling; No muscle, back, or extremity pain  PSYCHIATRIC: No depression, anxiety, mood swings, or difficulty sleeping  HEME/LYMPH: No easy bruising, or bleeding gums  ALLERGY AND IMMUNOLOGIC: No hives or eczema    MEDICATIONS  (STANDING):  albumin human 25% IVPB 50 milliLiter(s) IV Intermittent <User Schedule>  artificial  tears Solution 1 Drop(s) Both EYES every 12 hours  aspirin  chewable 81 milliGRAM(s) Oral daily  ceftolozane/tazobactam IVPB 150 milliGRAM(s) IV Intermittent every 8 hours  dextrose 5%. 1000 milliLiter(s) (100 mL/Hr) IV Continuous <Continuous>  dextrose 5%. 1000 milliLiter(s) (50 mL/Hr) IV Continuous <Continuous>  dextrose 50% Injectable 12.5 Gram(s) IV Push once  dextrose 50% Injectable 25 Gram(s) IV Push once  dextrose 50% Injectable 25 Gram(s) IV Push once  diltiazem    Tablet 60 milliGRAM(s) Oral three times a day  dronabinol 2.5 milliGRAM(s) Oral two times a day before meals  glucagon  Injectable 1 milliGRAM(s) IntraMuscular once  imipramine 50 milliGRAM(s) Oral daily  insulin glargine Injectable (LANTUS) 13 Unit(s) SubCutaneous at bedtime  insulin lispro (ADMELOG) corrective regimen sliding scale   SubCutaneous three times a day before meals  insulin lispro (ADMELOG) corrective regimen sliding scale   SubCutaneous at bedtime  lidocaine   4% Patch 1 Patch Transdermal daily  metoprolol tartrate 100 milliGRAM(s) Oral two times a day  midodrine. 10 milliGRAM(s) Oral three times a day  mirtazapine 7.5 milliGRAM(s) Oral at bedtime  mupirocin 2% Nasal 1 Application(s) Both Nostrils two times a day  pantoprazole    Tablet 40 milliGRAM(s) Oral before breakfast  polyethylene glycol 3350 17 Gram(s) Oral daily  senna 2 Tablet(s) Oral at bedtime  sodium zirconium cyclosilicate 5 Gram(s) Oral daily    MEDICATIONS  (PRN):  acetaminophen     Tablet .. 650 milliGRAM(s) Oral every 6 hours PRN Temp greater or equal to 38C (100.4F), Mild Pain (1 - 3)  dextrose Oral Gel 15 Gram(s) Oral once PRN Blood Glucose LESS THAN 70 milliGRAM(s)/deciliter      ALLERGIES: No Known Drug Allergies  latex (Hives)      FAMILY HISTORY:  FH: myocardial infarction (Father)    FH: myocardial infarction (Father)    Family history of type 2 diabetes mellitus in brother (Sibling)        PHYSICAL EXAMINATION:  -----------------------------  T(C): 37.1 (07-16-23 @ 05:17), Max: 37.1 (07-16-23 @ 05:17)  HR: 83 (07-16-23 @ 05:17) (66 - 87)  BP: 123/60 (07-16-23 @ 05:17) (92/59 - 157/67)  RR: 18 (07-16-23 @ 05:17) (18 - 18)  SpO2: 95% (07-16-23 @ 05:17) (94% - 99%)  Wt(kg): --    07-15 @ 07:01  -  07-16 @ 07:00  --------------------------------------------------------  IN:    IV PiggyBack: 200 mL  Total IN: 200 mL    OUT:    Other (mL): 1000 mL  Total OUT: 1000 mL    Total NET: -800 mL            VITALS  T(C): 37.1 (07-16-23 @ 05:17), Max: 37.1 (07-16-23 @ 05:17)  HR: 83 (07-16-23 @ 05:17) (66 - 87)  BP: 123/60 (07-16-23 @ 05:17) (92/59 - 157/67)  RR: 18 (07-16-23 @ 05:17) (18 - 18)  SpO2: 95% (07-16-23 @ 05:17) (94% - 99%)    Constitutional: well developed, normal appearance, well groomed, well nourished, no deformities and no acute distress.   Eyes: the conjunctiva exhibited no abnormalities and the eyelids demonstrated no xanthelasmas.   HEENT: normal oral mucosa, no oral pallor and no oral cyanosis.   Neck: normal jugular venous A waves present, normal jugular venous V waves present and no jugular venous wilson A waves.   Pulmonary: no respiratory distress, normal respiratory rhythm and effort, no accessory muscle use and lungs were clear to auscultation bilaterally.   Cardiovascular: heart rate and rhythm were normal, normal S1 and S2 and no murmur, gallop, rub, heave or thrill are present.   Abdomen: soft, non-tender, no hepato-splenomegaly and no abdominal mass palpated.   Musculoskeletal: the gait could not be assessed..   Extremities: no clubbing of the fingernails, no localized cyanosis, no petechial hemorrhages and no ischemic changes.   Skin: normal skin color and pigmentation, no rash, no venous stasis, no skin lesions, no skin ulcer and no xanthoma was observed.   Psychiatric: oriented to person, place, and time, the affect was normal, the mood was normal and not feeling anxious.     LABS:   --------  07-15    127<L>  |  96  |  86<H>  ----------------------------<  172<H>  5.3   |  19<L>  |  8.00<H>    Ca    9.4      15 Jul 2023 08:30  Phos  7.4     07-14  Mg     2.6     07-15                           10.6   17.51 )-----------( 384      ( 15 Jul 2023 08:30 )             33.5                 RADIOLOGY:  -----------------    ECG:     ECHO:

## 2023-07-16 NOTE — PROGRESS NOTE ADULT - ASSESSMENT
REASON FOR VISIT  .. Management of problems listed below      NOTEWORTHY FINDINGS.     PHYSICAL EXAM    HEENT Unremarkable  atraumatic   RESP Fair air entry  Harsh breath sound   CARDIAC S1 S2 No S3     NO JVD    ABDOMEN No hepatosplenomegaly   PEDAL EDEMA present No calf tenderness  NO rash       GENERAL DATA .     GOC.    . prior  ICU STAY.   .. 7/11/2023   COVID.   ..    BEST PRACTICE ISSUES.    HOB ELEVATN.   .. Yes  DVT PPLX.   .. 7/10 apixa held for thora  .. 7/3/2023 apixa 2.5 x2 restarted as IR feels we should tap fluid only if she develops shortness of breath  ..    7/2/2023 Apixaba 2.5 x2 held for thorac   STRESS ULCER PPLX.   ..      INFECTION PPLX.   ..  7/11 chlorhexidine 2%  .. 7/12 mupirocin 2%   SP SW AMINAH.    ..      7/13/2023 puree thin  DIET.    .. 7/11 npo   ..  7/4/2023 mince moist dc ed   IV fl.  ..     PROCEDURES.  ..   PAST PROCEDURES.    ..   GENERAL DATA .   ALLGY.  ..     latex                     WT.  ..  7/1/2023 54  BMI.  ..    7/1/2023 17     ABGS.  . 7/11/2023 7p 3l 743/34/182     VS/ PO/IO/ VENT/ DRIPS.    7/16/2023 afeb 120/60   7/16/2023 nc 99%     CC/DOA.        . 7/1/2023 Pt sent from [facility] for unwitnessed fall out of bed.       .  PRESENTATION.   . 7/1/2023 72-year-old female former smoker quit 3/2012  with PMH for hypertension, CAD sp cabg  PVD   sp R-> L bifem - fem BK pop bypass  Fem pop bypass 6/21/2022  Partial ray amputation r great toe 6/22/2022  diabetes, dementia, ESRD on hemodialysis   a fib on eliquis sp recent hosp stay 5/17-5/24/2023     . ENTERORHINOVIRUS INFECTION RESP TRACT 5/17  . PLEURAL EFFUSION SP l THORA 5/18 TRANSUDATE     COURSE   . ACETABULAR Fx Ortho recommended non surgical mgmt  . PL EFFSN Was initially decided to hold off thora unless she develops sob  . LEUKOCYTOSIS 7/11/2023 dw ID will plan thorac to exclude infecn  . ALTERED MENTAL STATE 7/11 Tr to icu poss ic bleed 7/13 tr out of icu      PROBLEMS/ASSESSMENT/RECOMMENDATIONS (A/R).  PULMONARY.  . GAS EXCHANGE.  .. A/R.   .. Monitor pulse ox and target po 90-95%    . PLEURAL EFFSN.  .. RELEVANT PAST HISTORU  .. 5/18/2023 l thorac 1.5 l   .. 5/18 pl fl l 13 m 73 p 5 afb sm (-) g 131 l 102/268 (.38) ph 7.6 pr 2.6/6.4 (.4)  .. Fluid transudate  .. WORKUP   .. CXR 7/1/2023 cxr Mod large l pl effs or lower zone airspace consolidation/atelecatsis   .. CT ch 7/1/2023 Ct ch   .... Decreased mod l effs since 5/17/2023   .. EVENTS  .. 7/2/2023 DW pt and dw son consensus is that they want fluid remived   .. 7/3/2023 dw ir plan changed plan is to tap if she becomes symptimatic   .. 7/3/2023 Thora cancelled as pt is comfortable will pan thora if she becomes symptomatic   .. 7/10/2023 as pt has persistent leukocytosis if no impovement will need thora so apixa 2.5 bid staoppd   . 7/11/2023  thora diagn orderd  . 7/11 Thora deferred  .. A/R  .. 7/14/2023 Will reorer thora if  ID recommends     ID.  . ESBL UTI.7/3  .. WORKUP.  .. W 7/1-7/2-7/3-7/6-7/7-7/9-7/10-7/11-7/12-7/13-7/15-7/16/2023   ..W 15 - 14 - 13- 13 - 15- 18 - 19 - 19 - 20- 18  - 17- 16   .. EVENTS.   .. 7/1/2023 Checlk blood and urine cs   .. MICRO.  .. uc 7/3/2023 100 k pseudomonas carbapenem resist    .. uc 7/3/2023 50-99 Klebs esbl   .. bc 7/10 (-)   .. mrsa 7/12 (+)   .. ABIO.  .. 7/8/2023 meropenem 500 x 3d Dr ALCAZAR dced   .. 7/11 ceftolozane tazobactam 150.3 zerbaxa x 2 do     . PERSISTENT LEUKOCYTOSIS.  .. w 7/11/2023 w 19  .. ct hip r 7/11/2023   .... no hematoma or fluid collectn   .... r acetabular fc 2.1 x 3.4 cm cortical stepoff   .. EVENTS.  .. 7/11/2023 dw id  will plan thorac   .. 7/11 ceftolozane tazobactam 150.3 zerbaxa x 2 do   .. A/R.  .. 7/12/2023 pt now in icu sec possible ic bleed   .. 7/12/2023 Thoracentesis scheduling will be decided by icu     CARDIO.  . HEMODYNAMICS.  .. 7/8/2023 midodrine 10.3 Dr CLIFFORD   .. target map 65 (+)     . CAD.  .. 7/1/2023 ASA 81   .. A/R  .. cont rx    . A fib.  .. 7/1/2023 CARDIZEM 60.3   .. 7/3/2023 apixaba 2.5 x2   .. 7/10 apixa held  .. AR  .. Cont rx  .. apixa to be restarted when cleared by neuro    HEMAT.  .. WORKUP.  .. Hb 7/1-7/2-7/3-7/6-7/9-7/11-7/12/2023 Hb 13 - 11.8 - 11.2 - 11- 12 -12.4- 11.4    .. INR 7/1/2023    .. A/R  .. As pt was on apixaba will monitorserially     RENAL.  . ESRD.  .. WORKUP  .. NA 7/1/2023    .. K 7/1/2023 K 5.1   .. CR 7/1/2023 CR 5  .. HD dependent     ORTHO.  . FALL 7/1/2023.  .. CT head C sp 7/1/2023 CT HC (-)     . FRACTURE ACETABULUM r INFERIOR PUBIC RAMUS r 7/1/2023   .. IMAGING.  .. XR Pelvis and r hip 7/1/2023 XR Fractures r acetabulum and inf r pubic ramus   .. 7/1/2023 ct pelvix   .... comminuted r acetabular fx involving r sup and inf pubic rami medial wallacetabulum sup acetabulum and post wall acetabulum   .... small hematoma r pelvic sidewall   .. ORTHO.  .. 7/1/2023 DW Dr Dias She spoke to Ortho Dr Jenkins group and plan is for nonsurgical management  .. A/R  .. Monitor serial Hb ro bleed     . IC BLEED 7/11.  .. ct head 7/11 cw cta h 7/11   .... 5 mm hyperattenuating curvilinear focus l parietal lobe may be sl increasd from 3 mm on 7/11 520p may represent small focus of hrrge v contrast training in ac infacrt  .. may need transfer tertiary center    .. mr brain no contr 7/12   .... r parietal remote petechial hrrge   .... l parietal petechial hrrge no excluded   .. Follow with neuro     . ALTERED MENTAL STATE 7/11  .. 7/11/2023  Tr to icu poss ic bleed for q h neurochecl   .. 7/13 tr out of icu    . MAIN ISSUES  .. FX acetabulum on conservative rx  . A fib  7/14/2023 doac to be restarted when cleared by neuro   . ESRD   . UTI 7/8/2023 started on meropenem by ID (UTI)   . LEUKOCYTOSIS 7/11/2023 dw ID will plan thorac to exclude infecn  7/11 ceftolozane/tazobactam 150.3 x 2 do thorac deferred   . ALTERED MENTAL STATE 7/11 Tr to icu poss ic bleed tr to tertiary being considered 7/13 tr out of icu      TIME SPENT   . About 36 minutes aggregate care time spent on encounter; activities included   direct patient care, counseling and/or coordinating care reviewing notes, lab data/ imaging , discussion with multidisciplinary team/ patient  /family and explaining in detail risks, benefits, alternatives  of the recommendations     JOSE F LAO 72 f7/1/2023 1948 DR HARRY ALBRIGHT

## 2023-07-16 NOTE — SWALLOW BEDSIDE ASSESSMENT ADULT - ASR SWALLOW RECOMMEND DIAG
not indicated at this time
Not indicated at this time d/t overt s/s noted at bedside
Objective assessment not rx'd at this time due to no overt s/s penetration/aspiration noted and pna not indicated on recent chest imaging.

## 2023-07-16 NOTE — SWALLOW BEDSIDE ASSESSMENT ADULT - SWALLOW EVAL: DIAGNOSIS
Patient demonstrates functional oropharyngeal swallow for puree, soft and bite sized solids and all liquid consistencies marked by adequate oral management, no signs of pharyngeal dysphagia, no overt signs of aspiration/penetration
1. Pt p/w functional oral-pharyngeal swallow with moderately thick liquids, purees and minced and moist solids with adequate oral acceptance, bolus containment, timely AP transit and adequate hyo-laryngeal elevation upon palpation w/o s/s of airway penetration/aspiration noted at bedside.                                                             2. Pt p/w moderate oral-pharyngeal dysphagia with mildly thick liquids marked by suspected posterior loss, reduced bolus control, multiple swallows and wet vocal quality s/p swallow with labored breathing, suggestive of airway penetration/aspiration at bedside.
Mild-moderate oral dysphagia marked by increased oral transit time, suspected posterior loss.  Pharyngeal stage deemed functional, no overt s/s penetration/aspiration noted.

## 2023-07-16 NOTE — PROGRESS NOTE ADULT - SUBJECTIVE AND OBJECTIVE BOX
Date of Service: 07-16-23 @ 13:36    Patient is a 72y old  Female who presents with a chief complaint of s/p fall (16 Jul 2023 13:08)      INTERVAL HPI/OVERNIGHT EVENTS: Patient seen and examined. NAD. No complaints.    Vital Signs Last 24 Hrs  T(C): 35.9 (16 Jul 2023 11:25), Max: 37.1 (16 Jul 2023 05:17)  T(F): 96.6 (16 Jul 2023 11:25), Max: 98.7 (16 Jul 2023 05:17)  HR: 72 (16 Jul 2023 11:25) (66 - 87)  BP: 165/80 (16 Jul 2023 11:25) (92/59 - 165/80)  BP(mean): --  RR: 12 (16 Jul 2023 11:25) (12 - 18)  SpO2: 99% (16 Jul 2023 11:25) (94% - 99%)    Parameters below as of 16 Jul 2023 11:25  Patient On (Oxygen Delivery Method): room air        07-16    133<L>  |  98  |  53<H>  ----------------------------<  166<H>  4.9   |  22  |  5.50<H>    Ca    9.5      16 Jul 2023 11:04  Mg     2.4     07-16                            10.8   16.04 )-----------( 441      ( 16 Jul 2023 11:04 )             33.7       CAPILLARY BLOOD GLUCOSE      POCT Blood Glucose.: 172 mg/dL (16 Jul 2023 11:29)  POCT Blood Glucose.: 116 mg/dL (16 Jul 2023 07:35)  POCT Blood Glucose.: 176 mg/dL (15 Jul 2023 22:36)  POCT Blood Glucose.: 186 mg/dL (15 Jul 2023 16:52)    Urinalysis Basic - ( 16 Jul 2023 11:04 )    Color: x / Appearance: x / SG: x / pH: x  Gluc: 166 mg/dL / Ketone: x  / Bili: x / Urobili: x   Blood: x / Protein: x / Nitrite: x   Leuk Esterase: x / RBC: x / WBC x   Sq Epi: x / Non Sq Epi: x / Bacteria: x              acetaminophen     Tablet .. 650 milliGRAM(s) Oral every 6 hours PRN  albumin human 25% IVPB 50 milliLiter(s) IV Intermittent <User Schedule>  artificial  tears Solution 1 Drop(s) Both EYES every 12 hours  aspirin  chewable 81 milliGRAM(s) Oral daily  ceftolozane/tazobactam IVPB 150 milliGRAM(s) IV Intermittent every 8 hours  dextrose 5%. 1000 milliLiter(s) IV Continuous <Continuous>  dextrose 5%. 1000 milliLiter(s) IV Continuous <Continuous>  dextrose 50% Injectable 12.5 Gram(s) IV Push once  dextrose 50% Injectable 25 Gram(s) IV Push once  dextrose 50% Injectable 25 Gram(s) IV Push once  dextrose Oral Gel 15 Gram(s) Oral once PRN  diltiazem    Tablet 60 milliGRAM(s) Oral three times a day  dronabinol 2.5 milliGRAM(s) Oral two times a day before meals  glucagon  Injectable 1 milliGRAM(s) IntraMuscular once  imipramine 50 milliGRAM(s) Oral daily  insulin glargine Injectable (LANTUS) 13 Unit(s) SubCutaneous at bedtime  insulin lispro (ADMELOG) corrective regimen sliding scale   SubCutaneous three times a day before meals  insulin lispro (ADMELOG) corrective regimen sliding scale   SubCutaneous at bedtime  lidocaine   4% Patch 1 Patch Transdermal daily  metoprolol tartrate 100 milliGRAM(s) Oral two times a day  midodrine. 10 milliGRAM(s) Oral three times a day  mirtazapine 7.5 milliGRAM(s) Oral at bedtime  mupirocin 2% Nasal 1 Application(s) Both Nostrils two times a day  pantoprazole    Tablet 40 milliGRAM(s) Oral before breakfast  polyethylene glycol 3350 17 Gram(s) Oral daily  senna 2 Tablet(s) Oral at bedtime  sodium zirconium cyclosilicate 5 Gram(s) Oral daily               REVIEW OF SYSTEMS:  CONSTITUTIONAL: No fever, no weight loss, or no fatigue  NECK: No pain, no stiffness  RESPIRATORY: No cough, no wheezing, no chills, no hemoptysis, No shortness of breath  CARDIOVASCULAR: No chest pain, no palpitations, no dizziness, no leg swelling  GASTROINTESTINAL: No abdominal pain. No nausea, no vomiting, no hematemesis; No diarrhea, no constipation. No melena, no hematochezia.  GENITOURINARY: No dysuria, no frequency, no hematuria, no incontinence  NEUROLOGICAL: No headaches, no loss of strength, no numbness, no tremors  SKIN: No itching, no burning  MUSCULOSKELETAL: No joint pain, no swelling; No muscle, no back, no extremity pain  PSYCHIATRIC: No depression, no mood swings,   HEME/LYMPH: No easy bruising, no bleeding gums  ALLERY AND IMMUNOLOGIC: No hives       Consultant(s) Notes Reviewed:  [X] YES  [ ] NO    PHYSICAL EXAM:  GENERAL: NAD  HEAD:  Atraumatic, Normocephalic  EYES: EOMI, PERRLA, conjunctiva and sclera clear  ENMT: No tonsillar erythema, exudates, or enlargement; Moist mucous membranes  NECK: Supple, No JVD  NERVOUS SYSTEM:  Awake & alert  CHEST/LUNG: Clear to auscultation bilaterally; No rales, rhonchi, wheezing,  HEART: Regular rate and rhythm  ABDOMEN: Soft, Nontender, Nondistended; Bowel sounds present  EXTREMITIES:  No clubbing, cyanosis, or edema  LYMPH: No lymphadenopathy noted  SKIN: No rashes      Advanced care planning discussed with patient/family [X] YES   [ ] NO    Advanced care planning discussed with patient/family. Patient's health status was discussed. All appropriate changes have been made regarding patient's end-of-life care. Advanced care planning forms reviewed/discussed/completed.  20 minutes spent.

## 2023-07-17 ENCOUNTER — TRANSCRIPTION ENCOUNTER (OUTPATIENT)
Age: 73
End: 2023-07-17

## 2023-07-17 VITALS — DIASTOLIC BLOOD PRESSURE: 84 MMHG | SYSTOLIC BLOOD PRESSURE: 170 MMHG | HEART RATE: 87 BPM

## 2023-07-17 LAB
ANION GAP SERPL CALC-SCNC: 13 MMOL/L — SIGNIFICANT CHANGE UP (ref 5–17)
BUN SERPL-MCNC: 65 MG/DL — HIGH (ref 7–23)
CALCIUM SERPL-MCNC: 9.6 MG/DL — SIGNIFICANT CHANGE UP (ref 8.5–10.1)
CHLORIDE SERPL-SCNC: 98 MMOL/L — SIGNIFICANT CHANGE UP (ref 96–108)
CO2 SERPL-SCNC: 23 MMOL/L — SIGNIFICANT CHANGE UP (ref 22–31)
CREAT SERPL-MCNC: 7.1 MG/DL — HIGH (ref 0.5–1.3)
EGFR: 6 ML/MIN/1.73M2 — LOW
GLUCOSE SERPL-MCNC: 222 MG/DL — HIGH (ref 70–99)
HCT VFR BLD CALC: 34.5 % — SIGNIFICANT CHANGE UP (ref 34.5–45)
HGB BLD-MCNC: 11 G/DL — LOW (ref 11.5–15.5)
MAGNESIUM SERPL-MCNC: 2.5 MG/DL — SIGNIFICANT CHANGE UP (ref 1.6–2.6)
MCHC RBC-ENTMCNC: 31.6 PG — SIGNIFICANT CHANGE UP (ref 27–34)
MCHC RBC-ENTMCNC: 31.9 GM/DL — LOW (ref 32–36)
MCV RBC AUTO: 99.1 FL — SIGNIFICANT CHANGE UP (ref 80–100)
NRBC # BLD: 0 /100 WBCS — SIGNIFICANT CHANGE UP (ref 0–0)
PLATELET # BLD AUTO: 449 K/UL — HIGH (ref 150–400)
POTASSIUM SERPL-MCNC: 5.4 MMOL/L — HIGH (ref 3.5–5.3)
POTASSIUM SERPL-SCNC: 5.4 MMOL/L — HIGH (ref 3.5–5.3)
RBC # BLD: 3.48 M/UL — LOW (ref 3.8–5.2)
RBC # FLD: 15.4 % — HIGH (ref 10.3–14.5)
SODIUM SERPL-SCNC: 134 MMOL/L — LOW (ref 135–145)
WBC # BLD: 17.48 K/UL — HIGH (ref 3.8–10.5)
WBC # FLD AUTO: 17.48 K/UL — HIGH (ref 3.8–10.5)

## 2023-07-17 PROCEDURE — 83880 ASSAY OF NATRIURETIC PEPTIDE: CPT

## 2023-07-17 PROCEDURE — 83605 ASSAY OF LACTIC ACID: CPT

## 2023-07-17 PROCEDURE — 87340 HEPATITIS B SURFACE AG IA: CPT

## 2023-07-17 PROCEDURE — 82962 GLUCOSE BLOOD TEST: CPT

## 2023-07-17 PROCEDURE — 87637 SARSCOV2&INF A&B&RSV AMP PRB: CPT

## 2023-07-17 PROCEDURE — 70450 CT HEAD/BRAIN W/O DYE: CPT

## 2023-07-17 PROCEDURE — 83735 ASSAY OF MAGNESIUM: CPT

## 2023-07-17 PROCEDURE — 87640 STAPH A DNA AMP PROBE: CPT

## 2023-07-17 PROCEDURE — 87077 CULTURE AEROBIC IDENTIFY: CPT

## 2023-07-17 PROCEDURE — 97162 PT EVAL MOD COMPLEX 30 MIN: CPT

## 2023-07-17 PROCEDURE — 86706 HEP B SURFACE ANTIBODY: CPT

## 2023-07-17 PROCEDURE — 84146 ASSAY OF PROLACTIN: CPT

## 2023-07-17 PROCEDURE — 82550 ASSAY OF CK (CPK): CPT

## 2023-07-17 PROCEDURE — 73701 CT LOWER EXTREMITY W/DYE: CPT

## 2023-07-17 PROCEDURE — P9047: CPT

## 2023-07-17 PROCEDURE — 76376 3D RENDER W/INTRP POSTPROCES: CPT

## 2023-07-17 PROCEDURE — 72190 X-RAY EXAM OF PELVIS: CPT

## 2023-07-17 PROCEDURE — 84145 PROCALCITONIN (PCT): CPT

## 2023-07-17 PROCEDURE — 0042T: CPT

## 2023-07-17 PROCEDURE — 84100 ASSAY OF PHOSPHORUS: CPT

## 2023-07-17 PROCEDURE — 82553 CREATINE MB FRACTION: CPT

## 2023-07-17 PROCEDURE — 83036 HEMOGLOBIN GLYCOSYLATED A1C: CPT

## 2023-07-17 PROCEDURE — 86803 HEPATITIS C AB TEST: CPT

## 2023-07-17 PROCEDURE — 85730 THROMBOPLASTIN TIME PARTIAL: CPT

## 2023-07-17 PROCEDURE — 72192 CT PELVIS W/O DYE: CPT | Mod: MA

## 2023-07-17 PROCEDURE — 84484 ASSAY OF TROPONIN QUANT: CPT

## 2023-07-17 PROCEDURE — 87641 MR-STAPH DNA AMP PROBE: CPT

## 2023-07-17 PROCEDURE — 85025 COMPLETE CBC W/AUTO DIFF WBC: CPT

## 2023-07-17 PROCEDURE — 85027 COMPLETE CBC AUTOMATED: CPT

## 2023-07-17 PROCEDURE — 85610 PROTHROMBIN TIME: CPT

## 2023-07-17 PROCEDURE — 99261: CPT

## 2023-07-17 PROCEDURE — 97530 THERAPEUTIC ACTIVITIES: CPT

## 2023-07-17 PROCEDURE — 80053 COMPREHEN METABOLIC PANEL: CPT

## 2023-07-17 PROCEDURE — 86901 BLOOD TYPING SEROLOGIC RH(D): CPT

## 2023-07-17 PROCEDURE — 80048 BASIC METABOLIC PNL TOTAL CA: CPT

## 2023-07-17 PROCEDURE — 97110 THERAPEUTIC EXERCISES: CPT

## 2023-07-17 PROCEDURE — 99233 SBSQ HOSP IP/OBS HIGH 50: CPT

## 2023-07-17 PROCEDURE — 92610 EVALUATE SWALLOWING FUNCTION: CPT

## 2023-07-17 PROCEDURE — 99285 EMERGENCY DEPT VISIT HI MDM: CPT | Mod: 25

## 2023-07-17 PROCEDURE — 87186 SC STD MICRODIL/AGAR DIL: CPT

## 2023-07-17 PROCEDURE — 70498 CT ANGIOGRAPHY NECK: CPT

## 2023-07-17 PROCEDURE — 86850 RBC ANTIBODY SCREEN: CPT

## 2023-07-17 PROCEDURE — 36415 COLL VENOUS BLD VENIPUNCTURE: CPT

## 2023-07-17 PROCEDURE — 82140 ASSAY OF AMMONIA: CPT

## 2023-07-17 PROCEDURE — 86704 HEP B CORE ANTIBODY TOTAL: CPT

## 2023-07-17 PROCEDURE — 81001 URINALYSIS AUTO W/SCOPE: CPT

## 2023-07-17 PROCEDURE — 71045 X-RAY EXAM CHEST 1 VIEW: CPT

## 2023-07-17 PROCEDURE — 73502 X-RAY EXAM HIP UNI 2-3 VIEWS: CPT

## 2023-07-17 PROCEDURE — 70551 MRI BRAIN STEM W/O DYE: CPT

## 2023-07-17 PROCEDURE — 83615 LACTATE (LD) (LDH) ENZYME: CPT

## 2023-07-17 PROCEDURE — 82803 BLOOD GASES ANY COMBINATION: CPT

## 2023-07-17 PROCEDURE — 86900 BLOOD TYPING SEROLOGIC ABO: CPT

## 2023-07-17 PROCEDURE — 74176 CT ABD & PELVIS W/O CONTRAST: CPT

## 2023-07-17 PROCEDURE — 93005 ELECTROCARDIOGRAM TRACING: CPT

## 2023-07-17 PROCEDURE — 71250 CT THORAX DX C-: CPT

## 2023-07-17 PROCEDURE — 99232 SBSQ HOSP IP/OBS MODERATE 35: CPT

## 2023-07-17 PROCEDURE — 87086 URINE CULTURE/COLONY COUNT: CPT

## 2023-07-17 PROCEDURE — 87040 BLOOD CULTURE FOR BACTERIA: CPT

## 2023-07-17 PROCEDURE — 70496 CT ANGIOGRAPHY HEAD: CPT

## 2023-07-17 PROCEDURE — 72125 CT NECK SPINE W/O DYE: CPT | Mod: MA

## 2023-07-17 PROCEDURE — 73552 X-RAY EXAM OF FEMUR 2/>: CPT

## 2023-07-17 RX ORDER — MIDODRINE HYDROCHLORIDE 2.5 MG/1
1 TABLET ORAL
Qty: 0 | Refills: 0 | DISCHARGE
Start: 2023-07-17

## 2023-07-17 RX ORDER — TRAMADOL HYDROCHLORIDE 50 MG/1
0.5 TABLET ORAL
Qty: 0 | Refills: 0 | DISCHARGE
Start: 2023-07-17

## 2023-07-17 RX ORDER — BACITRACIN ZINC 500 UNIT/G
1 OINTMENT IN PACKET (EA) TOPICAL
Refills: 0 | DISCHARGE

## 2023-07-17 RX ORDER — MIDODRINE HYDROCHLORIDE 2.5 MG/1
5 TABLET ORAL EVERY 8 HOURS
Refills: 0 | Status: DISCONTINUED | OUTPATIENT
Start: 2023-07-17 | End: 2023-07-17

## 2023-07-17 RX ORDER — DRONABINOL 2.5 MG
1 CAPSULE ORAL
Qty: 0 | Refills: 0 | DISCHARGE
Start: 2023-07-17

## 2023-07-17 RX ORDER — POLYETHYLENE GLYCOL 3350 17 G/17G
17 POWDER, FOR SOLUTION ORAL
Qty: 0 | Refills: 0 | DISCHARGE
Start: 2023-07-17

## 2023-07-17 RX ORDER — TRAMADOL HYDROCHLORIDE 50 MG/1
25 TABLET ORAL THREE TIMES A DAY
Refills: 0 | Status: DISCONTINUED | OUTPATIENT
Start: 2023-07-17 | End: 2023-07-17

## 2023-07-17 RX ORDER — TRAMADOL HYDROCHLORIDE 50 MG/1
50 TABLET ORAL
Refills: 0 | Status: DISCONTINUED | OUTPATIENT
Start: 2023-07-17 | End: 2023-07-17

## 2023-07-17 RX ORDER — TRAMADOL HYDROCHLORIDE 50 MG/1
1 TABLET ORAL
Qty: 0 | Refills: 0 | DISCHARGE
Start: 2023-07-17

## 2023-07-17 RX ORDER — ATORVASTATIN CALCIUM 80 MG/1
1 TABLET, FILM COATED ORAL
Refills: 0 | DISCHARGE

## 2023-07-17 RX ADMIN — TRAMADOL HYDROCHLORIDE 25 MILLIGRAM(S): 50 TABLET ORAL at 12:45

## 2023-07-17 RX ADMIN — Medication 1: at 17:06

## 2023-07-17 RX ADMIN — Medication 1 DROP(S): at 06:02

## 2023-07-17 RX ADMIN — SODIUM ZIRCONIUM CYCLOSILICATE 5 GRAM(S): 10 POWDER, FOR SUSPENSION ORAL at 09:15

## 2023-07-17 RX ADMIN — Medication 50 MILLIGRAM(S): at 11:50

## 2023-07-17 RX ADMIN — Medication 2: at 08:03

## 2023-07-17 RX ADMIN — Medication 60 MILLIGRAM(S): at 13:03

## 2023-07-17 RX ADMIN — Medication 100 MILLIGRAM(S): at 06:02

## 2023-07-17 RX ADMIN — MIDODRINE HYDROCHLORIDE 10 MILLIGRAM(S): 2.5 TABLET ORAL at 06:02

## 2023-07-17 RX ADMIN — CEFTOLOZANE AND TAZOBACTAM 100 MILLIGRAM(S): 1; .5 INJECTION, POWDER, LYOPHILIZED, FOR SOLUTION INTRAVENOUS at 06:01

## 2023-07-17 RX ADMIN — POLYETHYLENE GLYCOL 3350 17 GRAM(S): 17 POWDER, FOR SOLUTION ORAL at 11:50

## 2023-07-17 RX ADMIN — PANTOPRAZOLE SODIUM 40 MILLIGRAM(S): 20 TABLET, DELAYED RELEASE ORAL at 06:03

## 2023-07-17 RX ADMIN — Medication 2: at 11:50

## 2023-07-17 RX ADMIN — LIDOCAINE 1 PATCH: 4 CREAM TOPICAL at 11:49

## 2023-07-17 RX ADMIN — CEFTOLOZANE AND TAZOBACTAM 100 MILLIGRAM(S): 1; .5 INJECTION, POWDER, LYOPHILIZED, FOR SOLUTION INTRAVENOUS at 13:03

## 2023-07-17 RX ADMIN — TRAMADOL HYDROCHLORIDE 25 MILLIGRAM(S): 50 TABLET ORAL at 11:50

## 2023-07-17 RX ADMIN — Medication 2.5 MILLIGRAM(S): at 06:24

## 2023-07-17 RX ADMIN — Medication 60 MILLIGRAM(S): at 06:02

## 2023-07-17 RX ADMIN — Medication 81 MILLIGRAM(S): at 11:51

## 2023-07-17 NOTE — PROGRESS NOTE ADULT - ASSESSMENT
72-year-old female former smoker quit 3/2012  with significant past medical history for hypertension, CAD sp cabg  PVD  diabetes, dementia, end-stage renal disease on hemodialysis   a fib on eliquis sp recent hosp stay 5/17-5/24/2023  admitted 3/7-3/11/2023 and before that 2/22-2/25/2023   . During 5/2023 admission she had   . ENTERORHINOVIRUS INFECTION RESP TRACT 5/17  . PLEURAL EFFUSION SP l THORA 5/18 TRANSUDATE   Patient now  presented to the ED 7/1/2023 status post unwitnessed fall from [facility].  Patient states that she heard some voices then rolled out of bed.  Patient complaining of right hip pain.  Unknown head trauma.  + Ashleedeangelo     She was found to have acetabular fracture which Ortho was contacted and they plan non operative management Pt was admitted to [facility] for bleed as she was on apixaban and was noted to have pl effsn    . 7/1/2023  I was asked to admit pt                      (01 Jul 2023 13:32)      esrd on hd         ANEMIA PLAN:  Anemia of chronic disease:  We will continue epo aiming for a HCT of 32-36 %.   We will monitor Iron stores, B12 and RBC folate .      BP monitoring,continue current midodrine ,     monitoring and insulin sliding scale coverage, no concentrated sweets diet, serial labs and f/up with PMD, monitor HB A 1 C every 3-4 mnth

## 2023-07-17 NOTE — PROGRESS NOTE ADULT - PROBLEM SELECTOR PROBLEM 7
Closed pelvic fracture
Prophylactic measure
ESRD on dialysis
ESRD on dialysis
Pleural effusion
Pubic ramus fracture
ESRD on dialysis
Pubic ramus fracture
Pubic ramus fracture
Pleural effusion
Closed pelvic fracture
Pubic ramus fracture
Closed pelvic fracture

## 2023-07-17 NOTE — CAREGIVER ENGAGEMENT NOTE - CAREGIVER OUTREACH NOTES - FREE TEXT
spoke with pts son, aware of pts dc back to [facility] today, in agreement with plan. moe coordinated for 4 pm

## 2023-07-17 NOTE — PROGRESS NOTE ADULT - PROBLEM SELECTOR PLAN 8
Pain management ,OT/PT
Pain management ,OT/PT
pain management ,PT/OT
thoracocentesis if symptomatic -as per pulm cons
Gastrointestinal stress ulcer prophylaxis and DVT prophylaxis administered
Gastrointestinal stress ulcer prophylaxis and DVT prophylaxis administered
pain management ,PT/OT
thoracocentesis if symptomatic -as per pulm cons
thoracocentesis if symptomatic -as per pulm cons
Pain management ,OT/PT
Pain management ,OT/PT
pain management ,PT/OT
Pain management ,OT/PT
pain management ,PT/OT
Pain management ,OT/PT

## 2023-07-17 NOTE — PROGRESS NOTE ADULT - SUBJECTIVE AND OBJECTIVE BOX
CHIEF COMPLAINT/ REASON FOR VISIT  .. Patient was seen to address the  issue listed under PROBLEM LIST which is located toward bottom of this note     SHILO KOHLER    PLV 2EAS 201 W1    Allergies    No Known Drug Allergies  latex (Hives)    Intolerances        PAST MEDICAL & SURGICAL HISTORY:  HTN (hypertension)      h/o Anxiety attack      Depression      h/o Myocardial infarct 2007      h/o Hepatitis A 1969  currently resolved, no symptoms      Murmur, cardiac      h/o Smoking  quitted 3/2012      Anemia secondary to renal failure      ESRD on dialysis      Falls      Ataxia      Type 2 diabetes mellitus      Peripheral vascular disease, unspecified      CAD (coronary artery disease)      Diabetes      coronary stent 2007      s/p Ovarian cyst removal      s/p surgical removal of benign Skin lesion epigastric area      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          FAMILY HISTORY:  FH: myocardial infarction (Father)    FH: myocardial infarction (Father)    Family history of type 2 diabetes mellitus in brother (Sibling)        Home Medications:  acetaminophen 325 mg oral tablet: 2 tab(s) orally every 6 hours as needed (02 Jul 2023 15:24)  Admelog SoloStar 100 units/mL injectable solution: injectable 3 times a day (before meals)sliding scale  (02 Jul 2023 15:24)  apixaban 2.5 mg oral tablet: 1 tab(s) orally 2 times a day (02 Jul 2023 15:24)  aspirin 81 mg oral delayed release tablet: 1 tab(s) orally once a day (at bedtime) (02 Jul 2023 15:24)  atorvastatin 20 mg oral tablet: 1 tab(s) orally once a day (at bedtime) (02 Jul 2023 15:24)  bacitracin 500 units/g topical ointment: Apply topically to affected area once a day to right knee abrasion (02 Jul 2023 11:54)  Basaglar KwikPen 100 units/mL subcutaneous solution: 8 international unit(s) subcutaneous once a day (at bedtime) (02 Jul 2023 15:24)  cloNIDine 0.1 mg oral tablet: 1 tab(s) orally 2 times a day (02 Jul 2023 15:24)  DilTIAZem (Eqv-Cardizem CD) 180 mg/24 hours oral capsule, extended release: 1 cap(s) orally once a day (02 Jul 2023 15:24)  imipramine 50 mg oral tablet: 1 tab(s) orally once a day (in the evening) (02 Jul 2023 15:24)  metoprolol tartrate 100 mg oral tablet: 1 tab(s) orally 2 times a day (02 Jul 2023 15:24)  mirtazapine 7.5 mg oral tablet: 1 tab(s) orally once a day (at bedtime) (02 Jul 2023 15:24)  Nephro-Alfie oral tablet: 1 tab(s) orally once a day (02 Jul 2023 15:24)  PANTOPRAZOLE 40MG DR TAB: 1 tab(s) orally once a day (02 Jul 2023 15:24)  senna leaf extract oral tablet: 2 tab(s) orally once a day (at bedtime) (02 Jul 2023 15:24)      MEDICATIONS  (STANDING):  albumin human 25% IVPB 50 milliLiter(s) IV Intermittent <User Schedule>  artificial  tears Solution 1 Drop(s) Both EYES every 12 hours  aspirin  chewable 81 milliGRAM(s) Oral daily  ceftolozane/tazobactam IVPB 150 milliGRAM(s) IV Intermittent every 8 hours  dextrose 5%. 1000 milliLiter(s) (50 mL/Hr) IV Continuous <Continuous>  dextrose 5%. 1000 milliLiter(s) (100 mL/Hr) IV Continuous <Continuous>  dextrose 50% Injectable 12.5 Gram(s) IV Push once  dextrose 50% Injectable 25 Gram(s) IV Push once  dextrose 50% Injectable 25 Gram(s) IV Push once  diltiazem    Tablet 60 milliGRAM(s) Oral three times a day  dronabinol 2.5 milliGRAM(s) Oral two times a day before meals  glucagon  Injectable 1 milliGRAM(s) IntraMuscular once  imipramine 50 milliGRAM(s) Oral daily  insulin glargine Injectable (LANTUS) 13 Unit(s) SubCutaneous at bedtime  insulin lispro (ADMELOG) corrective regimen sliding scale   SubCutaneous three times a day before meals  insulin lispro (ADMELOG) corrective regimen sliding scale   SubCutaneous at bedtime  lidocaine   4% Patch 1 Patch Transdermal daily  metoprolol tartrate 100 milliGRAM(s) Oral two times a day  midodrine. 10 milliGRAM(s) Oral three times a day  mirtazapine 7.5 milliGRAM(s) Oral at bedtime  pantoprazole    Tablet 40 milliGRAM(s) Oral before breakfast  polyethylene glycol 3350 17 Gram(s) Oral daily  senna 2 Tablet(s) Oral at bedtime  sodium zirconium cyclosilicate 5 Gram(s) Oral daily    MEDICATIONS  (PRN):  acetaminophen     Tablet .. 650 milliGRAM(s) Oral every 6 hours PRN Temp greater or equal to 38C (100.4F), Mild Pain (1 - 3)  dextrose Oral Gel 15 Gram(s) Oral once PRN Blood Glucose LESS THAN 70 milliGRAM(s)/deciliter              Vital Signs Last 24 Hrs  T(C): 36.7 (17 Jul 2023 05:47), Max: 36.9 (16 Jul 2023 20:22)  T(F): 98.1 (17 Jul 2023 05:47), Max: 98.5 (16 Jul 2023 20:22)  HR: 84 (17 Jul 2023 05:47) (72 - 92)  BP: 178/65 (17 Jul 2023 05:47) (161/78 - 178/65)  BP(mean): --  RR: 18 (17 Jul 2023 05:47) (12 - 18)  SpO2: 98% (17 Jul 2023 05:47) (91% - 99%)    Parameters below as of 17 Jul 2023 05:47  Patient On (Oxygen Delivery Method): room air          07-16-23 @ 07:01  -  07-17-23 @ 07:00  --------------------------------------------------------  IN: 325 mL / OUT: 0 mL / NET: 325 mL              LABS:                        10.8   16.04 )-----------( 441      ( 16 Jul 2023 11:04 )             33.7     07-16    133<L>  |  98  |  53<H>  ----------------------------<  166<H>  4.9   |  22  |  5.50<H>    Ca    9.5      16 Jul 2023 11:04  Mg     2.4     07-16        Urinalysis Basic - ( 16 Jul 2023 11:04 )    Color: x / Appearance: x / SG: x / pH: x  Gluc: 166 mg/dL / Ketone: x  / Bili: x / Urobili: x   Blood: x / Protein: x / Nitrite: x   Leuk Esterase: x / RBC: x / WBC x   Sq Epi: x / Non Sq Epi: x / Bacteria: x            WBC:  WBC Count: 16.04 K/uL (07-16 @ 11:04)  WBC Count: 17.51 K/uL (07-15 @ 08:30)  WBC Count: 15.59 K/uL (07-14 @ 11:40)  WBC Count: 18.67 K/uL (07-13 @ 08:22)      MICROBIOLOGY:  RECENT CULTURES:  07-10 .Blood Blood XXXX XXXX   No growth at 5 days    07-10 .Blood Blood XXXX XXXX   No growth at 5 days                    Sodium:  Sodium: 133 mmol/L (07-16 @ 11:04)  Sodium: 127 mmol/L (07-15 @ 08:30)  Sodium: 134 mmol/L (07-14 @ 11:40)  Sodium: 130 mmol/L (07-13 @ 08:22)      5.50 mg/dL 07-16 @ 11:04  8.00 mg/dL 07-15 @ 08:30  7.00 mg/dL 07-14 @ 11:40  8.60 mg/dL 07-13 @ 08:22      Hemoglobin:  Hemoglobin: 10.8 g/dL (07-16 @ 11:04)  Hemoglobin: 10.6 g/dL (07-15 @ 08:30)  Hemoglobin: 11.2 g/dL (07-14 @ 11:40)  Hemoglobin: 11.9 g/dL (07-13 @ 08:22)      Platelets: Platelet Count - Automated: 441 K/uL (07-16 @ 11:04)  Platelet Count - Automated: 384 K/uL (07-15 @ 08:30)  Platelet Count - Automated: 519 K/uL (07-14 @ 11:40)  Platelet Count - Automated: 451 K/uL (07-13 @ 08:22)          Urinalysis Basic - ( 16 Jul 2023 11:04 )    Color: x / Appearance: x / SG: x / pH: x  Gluc: 166 mg/dL / Ketone: x  / Bili: x / Urobili: x   Blood: x / Protein: x / Nitrite: x   Leuk Esterase: x / RBC: x / WBC x   Sq Epi: x / Non Sq Epi: x / Bacteria: x        RADIOLOGY & ADDITIONAL STUDIES:      MICROBIOLOGY:  RECENT CULTURES:  07-10 .Blood Blood XXXX XXXX   No growth at 5 days    07-10 .Blood Blood XXXX XXXX   No growth at 5 days

## 2023-07-17 NOTE — PROGRESS NOTE ADULT - PROVIDER SPECIALTY LIST ADULT
Cardiology
Critical Care
Infectious Disease
Nephrology
Pulmonology
Pulmonology
Cardiology
Endocrinology
Infectious Disease
Internal Medicine
Nephrology
Neurology
Orthopedics
Pulmonology
Cardiology
Critical Care
Endocrinology
Infectious Disease
Nephrology
Neurology
Orthopedics
Pulmonology
Cardiology
Cardiology
Infectious Disease
Nephrology
Cardiology
Cardiology
Endocrinology
Endocrinology
Nephrology
Nephrology
Cardiology
Internal Medicine
Internal Medicine
Hospitalist
Internal Medicine
Hospitalist
Internal Medicine

## 2023-07-17 NOTE — PROGRESS NOTE ADULT - PROBLEM SELECTOR PROBLEM 9
Right acetabular fracture
Right acetabular fracture
Prophylactic measure
HTN (hypertension)
Right acetabular fracture
Right acetabular fracture
HTN (hypertension)
HTN (hypertension)
Prophylactic measure
Prophylactic measure
HTN (hypertension)
Right acetabular fracture
Right acetabular fracture

## 2023-07-17 NOTE — PROGRESS NOTE ADULT - SUBJECTIVE AND OBJECTIVE BOX
PROGRESS NOTE  Patient is a 72y old  Female who presents with a chief complaint of s/p fall (17 Jul 2023 13:47)  Chart and available morning labs /imaging are reviewed electronically , urgent issues addressed . More information  is being added upon completion of rounds , when more information is collected and management discussed with consultants , medical staff and social service/case management on the floor     OVERNIGHT  No new issues reported by medical staff . All above noted Patient is resting in a bed comfortably .Confused ,poor mentation .No distress noted Spoke to ID cons ok to d/c after 2 pm doase of abx ,no further abx upon d/c ,monitor in [facility]    HPI:  72-year-old female with significant past medical history for hypertension, diabetes, dementia, end-stage renal disease on hemodialysis presents to the ED status post unwitnessed fall from [facility].  Patient states that she heard some voices then rolled out of bed.  Patient complaining of right hip pain.  Unknown head trauma.  + Eliquis < from: Xray Femur showed  Fractures including the medial wall of the acetabulum and inferior right pubic ramus Admitted for pain management ,PT/OT        (01 Jul 2023 15:24)    PAST MEDICAL & SURGICAL HISTORY:  HTN (hypertension)      h/o Anxiety attack      Depression      h/o Myocardial infarct 2007      h/o Hepatitis A 1969  currently resolved, no symptoms      Murmur, cardiac      h/o Smoking  quitted 3/2012      Anemia secondary to renal failure      ESRD on dialysis      Falls      Ataxia      Type 2 diabetes mellitus      Peripheral vascular disease, unspecified      CAD (coronary artery disease)      Diabetes      coronary stent 2007      s/p Ovarian cyst removal      s/p surgical removal of benign Skin lesion epigastric area      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          MEDICATIONS  (STANDING):  albumin human 25% IVPB 50 milliLiter(s) IV Intermittent <User Schedule>  artificial  tears Solution 1 Drop(s) Both EYES every 12 hours  aspirin  chewable 81 milliGRAM(s) Oral daily  dextrose 5%. 1000 milliLiter(s) (50 mL/Hr) IV Continuous <Continuous>  dextrose 5%. 1000 milliLiter(s) (100 mL/Hr) IV Continuous <Continuous>  dextrose 50% Injectable 12.5 Gram(s) IV Push once  dextrose 50% Injectable 25 Gram(s) IV Push once  dextrose 50% Injectable 25 Gram(s) IV Push once  diltiazem    Tablet 60 milliGRAM(s) Oral three times a day  dronabinol 2.5 milliGRAM(s) Oral two times a day before meals  glucagon  Injectable 1 milliGRAM(s) IntraMuscular once  imipramine 50 milliGRAM(s) Oral daily  insulin glargine Injectable (LANTUS) 13 Unit(s) SubCutaneous at bedtime  insulin lispro (ADMELOG) corrective regimen sliding scale   SubCutaneous three times a day before meals  insulin lispro (ADMELOG) corrective regimen sliding scale   SubCutaneous at bedtime  lidocaine   4% Patch 1 Patch Transdermal daily  metoprolol tartrate 100 milliGRAM(s) Oral two times a day  midodrine. 5 milliGRAM(s) Oral every 8 hours  mirtazapine 7.5 milliGRAM(s) Oral at bedtime  pantoprazole    Tablet 40 milliGRAM(s) Oral before breakfast  polyethylene glycol 3350 17 Gram(s) Oral daily  senna 2 Tablet(s) Oral at bedtime  sodium zirconium cyclosilicate 5 Gram(s) Oral daily    MEDICATIONS  (PRN):  acetaminophen     Tablet .. 650 milliGRAM(s) Oral every 6 hours PRN Temp greater or equal to 38C (100.4F), Mild Pain (1 - 3)  dextrose Oral Gel 15 Gram(s) Oral once PRN Blood Glucose LESS THAN 70 milliGRAM(s)/deciliter  traMADol 50 milliGRAM(s) Oral two times a day PRN Severe Pain (7 - 10)  traMADol 25 milliGRAM(s) Oral three times a day PRN Moderate Pain (4 - 6)      OBJECTIVE    T(C): 36.6 (07-17-23 @ 11:49), Max: 36.9 (07-16-23 @ 20:22)  HR: 74 (07-17-23 @ 13:01) (74 - 87)  BP: 150/84 (07-17-23 @ 13:01) (145/59 - 178/65)  RR: 20 (07-17-23 @ 11:49) (17 - 20)  SpO2: 93% (07-17-23 @ 11:49) (93% - 98%)  Wt(kg): --  I&O's Summary    16 Jul 2023 07:01  -  17 Jul 2023 07:00  --------------------------------------------------------  IN: 325 mL / OUT: 0 mL / NET: 325 mL    17 Jul 2023 07:01  -  17 Jul 2023 14:17  --------------------------------------------------------  IN: 350 mL / OUT: 0 mL / NET: 350 mL          REVIEW OF SYSTEMS:  CONSTITUTIONAL: No fever, weight loss, or fatigue  EYES: No eye pain, visual disturbances, or discharge  ENMT:   No sinus or throat pain  NECK: No pain or stiffness  RESPIRATORY: No cough, wheezing, chills or hemoptysis; No shortness of breath  CARDIOVASCULAR: No chest pain, palpitations, dizziness, or leg swelling  GASTROINTESTINAL: No abdominal pain. No nausea, vomiting; No diarrhea or constipation. No melena or hematochezia.  GENITOURINARY: No dysuria, frequency, hematuria, or incontinence  NEUROLOGICAL: No headaches, memory loss, loss of strength, numbness, or tremors  SKIN: No itching, burning, rashes, or lesions   MUSCULOSKELETAL: No joint pain or swelling; No muscle, back, or extremity pain    PHYSICAL EXAM:  Appearance: NAD. VS past 24 hrs -as above   HEENT:   Moist oral mucosa. Conjunctiva clear b/l.   Neck : supple  Respiratory: Lungs CTAB.  Gastrointestinal:  Soft, nontender. No rebound. No rigidity. BS present	  Cardiovascular: RRR ,S1S2 present  Neurologic: Non-focal. Moving all extremities.  Extremities: No edema. No erythema. No calf tenderness.  Skin: No rashes, No ecchymoses, No cyanosis.	  wounds ,skin lesions-See skin assesment flow sheet   LABS:                        11.0   17.48 )-----------( 449      ( 17 Jul 2023 06:50 )             34.5     07-17    134<L>  |  98  |  65<H>  ----------------------------<  222<H>  5.4<H>   |  23  |  7.10<H>    Ca    9.6      17 Jul 2023 06:50  Mg     2.5     07-17      CAPILLARY BLOOD GLUCOSE      POCT Blood Glucose.: 247 mg/dL (17 Jul 2023 11:37)  POCT Blood Glucose.: 250 mg/dL (17 Jul 2023 07:18)  POCT Blood Glucose.: 251 mg/dL (16 Jul 2023 21:06)  POCT Blood Glucose.: 171 mg/dL (16 Jul 2023 16:42)      Urinalysis Basic - ( 17 Jul 2023 06:50 )    Color: x / Appearance: x / SG: x / pH: x  Gluc: 222 mg/dL / Ketone: x  / Bili: x / Urobili: x   Blood: x / Protein: x / Nitrite: x   Leuk Esterase: x / RBC: x / WBC x   Sq Epi: x / Non Sq Epi: x / Bacteria: x        Culture - Blood (collected 10 Jul 2023 16:25)  Source: .Blood Blood  Final Report (16 Jul 2023 01:01):    No growth at 5 days    Culture - Blood (collected 10 Jul 2023 16:20)  Source: .Blood Blood  Final Report (16 Jul 2023 01:01):    No growth at 5 days      RADIOLOGY & ADDITIONAL TESTS:< from: MR Head No Cont (07.12.23 @ 13:54) >  IMPRESSION:    1. Ischemic white matter disease upper range typical for age    2. Diffuse brain volume loss typical for age    3. Right parietal remote petechial hemorrhage, inconspicuous by the CT   technique    4. Small structure identified on CT 7/11/2023 within the left parietal   lobe within cerebral cortex and subcortical white matter remains   indeterminate, not clearly demonstrated by MR imaging.  No infarct or   hemorrhage is recognized at this location. Abscess previously noted, the   finding is not evident on prior CT 7/1/2023. The differential diagnosis   remains between retained contrast within a vascular structure versus   interval development of calcification versus interval petechial hemorrhage    < end of copied text >     reviewed elctronically  ASSESSMENT/PLAN: 	  Patient was seen and examined on a day of discharge . Plan of care , discharge medications and recommendations discussed with consultants and clearance for discharge obtained .Social service , case management  and medical staff are aware of plan. Family is notified. Discharge summary  is  prepared electronically-see separate document prepared by me .75minutes spent on this visit, 50% visit time spent in care co-ordination with other attendings and counselling patient  I have discussed care plan with patient and HCP,expressed understanding of problems treatment and their effect and side effects, alternatives in detail,I have asked if they have any questions and concerns and appropriately addressed them to best of my ability

## 2023-07-17 NOTE — PROGRESS NOTE ADULT - ASSESSMENT
72-year-old female with significant past medical history for hypertension, diabetes, dementia, end-stage renal disease on hemodialysis presents to the ED status post unwitnessed fall from [facility].  Patient states that she heard some voices then rolled out of bed.  Patient complaining of right hip pain.  Unknown head trauma.  + Eliquis < from: Xray Femur showed  Fractures including the medial wall of the acetabulum and inferior right pubic ramus Admitted for pain management ,PT/OT

## 2023-07-17 NOTE — CHART NOTE - NSCHARTNOTEFT_GEN_A_CORE
Assessment: 73 y/o female adm with closed pelvic fx. PMH DM 2, CAD, PVD, MI, depression, HTN, ESRD on HD.   Pt sleeping soundly at visit this morning. Spoke with RN. Pt eats well, tolerates diet without any issues. SLP all 7/16 rx soft/bite sized with thin liquids.   Last BM 7/14. Pt receives HD.   Factors impacting intake: [x ] none [ ] nausea  [ ] vomiting [ ] diarrhea [ ] constipation  [ ]chewing problems [ ] swallowing issues  [ ] other:     Diet Presciption: soft/bite sized with mildly thickened liquids   Intake: 25-75%    Current Weight: 7/1 119.9# 7/13 113# 7/17 125#  1+ edema to left arm    % Weight Change    Pertinent Medications: MEDICATIONS  (STANDING):  albumin human 25% IVPB 50 milliLiter(s) IV Intermittent <User Schedule>  artificial  tears Solution 1 Drop(s) Both EYES every 12 hours  aspirin  chewable 81 milliGRAM(s) Oral daily  ceftolozane/tazobactam IVPB 150 milliGRAM(s) IV Intermittent every 8 hours  dextrose 5%. 1000 milliLiter(s) (50 mL/Hr) IV Continuous <Continuous>  dextrose 5%. 1000 milliLiter(s) (100 mL/Hr) IV Continuous <Continuous>  dextrose 50% Injectable 12.5 Gram(s) IV Push once  dextrose 50% Injectable 25 Gram(s) IV Push once  dextrose 50% Injectable 25 Gram(s) IV Push once  diltiazem    Tablet 60 milliGRAM(s) Oral three times a day  dronabinol 2.5 milliGRAM(s) Oral two times a day before meals  glucagon  Injectable 1 milliGRAM(s) IntraMuscular once  imipramine 50 milliGRAM(s) Oral daily  insulin glargine Injectable (LANTUS) 13 Unit(s) SubCutaneous at bedtime  insulin lispro (ADMELOG) corrective regimen sliding scale   SubCutaneous three times a day before meals  insulin lispro (ADMELOG) corrective regimen sliding scale   SubCutaneous at bedtime  lidocaine   4% Patch 1 Patch Transdermal daily  metoprolol tartrate 100 milliGRAM(s) Oral two times a day  midodrine. 5 milliGRAM(s) Oral every 8 hours  mirtazapine 7.5 milliGRAM(s) Oral at bedtime  pantoprazole    Tablet 40 milliGRAM(s) Oral before breakfast  polyethylene glycol 3350 17 Gram(s) Oral daily  senna 2 Tablet(s) Oral at bedtime  sodium zirconium cyclosilicate 5 Gram(s) Oral daily    MEDICATIONS  (PRN):  acetaminophen     Tablet .. 650 milliGRAM(s) Oral every 6 hours PRN Temp greater or equal to 38C (100.4F), Mild Pain (1 - 3)  dextrose Oral Gel 15 Gram(s) Oral once PRN Blood Glucose LESS THAN 70 milliGRAM(s)/deciliter  traMADol 50 milliGRAM(s) Oral two times a day PRN Severe Pain (7 - 10)  traMADol 25 milliGRAM(s) Oral three times a day PRN Moderate Pain (4 - 6)    Pertinent Labs: 07-17 Na134 mmol/L<L> Glu 222 mg/dL<H> K+ 5.4 mmol/L<H> Cr  7.10 mg/dL<H> BUN 65 mg/dL<H> 07-14 Phos 7.4 mg/dL<H> 07-13 Alb 2.3 g/dL<L>     CAPILLARY BLOOD GLUCOSE      POCT Blood Glucose.: 247 mg/dL (17 Jul 2023 11:37)  POCT Blood Glucose.: 250 mg/dL (17 Jul 2023 07:18)  POCT Blood Glucose.: 251 mg/dL (16 Jul 2023 21:06)  POCT Blood Glucose.: 171 mg/dL (16 Jul 2023 16:42)    Skin: coccyx unstageable     Estimated Needs:   [x ] no change since previous assessment  [ ] recalculated:     Previous Nutrition Diagnosis:   [ ] Inadequate Energy Intake [ ]Inadequate Oral Intake [ ] Excessive Energy Intake   [ ] Underweight [x ] Increased Nutrient Needs [ ] Overweight/Obesity   [ ] Altered GI Function [ ] Unintended Weight Loss [ ] Food & Nutrition Related Knowledge Deficit [x ] Malnutrition     Nutrition Diagnosis is [x ] ongoing  [ ] resolved [ ] not applicable     New Nutrition Diagnosis: [x ] not applicable       Interventions:   Recommend  [ x] Change Diet To: add consistent carb diet restriction 2/2 elevated glucose readings (left message with Dr. Larson)   [ ] Nutrition Supplement  [ ] Nutrition Support  [ x] Other: K+ slightly elevated; monitor levels and need for Renal restriction.     Monitoring and Evaluation:   [ x] PO intake [ x ] Tolerance to diet prescription [ x ] weights [ x ] labs[ x ] follow up per protocol  [ ] other: Assessment: 73 y/o female adm with closed pelvic fx. PMH DM 2, CAD, PVD, MI, depression, HTN, ESRD on HD.   Pt sleeping soundly at visit this morning. Spoke with RN. Pt eats well, tolerates diet without any issues. SLP all 7/16 rx soft/bite sized with thin liquids.   Last BM 7/14. Pt receives HD.   Factors impacting intake: [x ] none [ ] nausea  [ ] vomiting [ ] diarrhea [ ] constipation  [ ]chewing problems [ ] swallowing issues  [ ] other:     Diet Presciption: soft/bite sized with mildly thickened liquids   Intake: 25-75%    Current Weight: 7/1 119.9# 7/13 113# 7/17 125#  1+ edema to left arm    % Weight Change    Pertinent Medications: MEDICATIONS  (STANDING):  albumin human 25% IVPB 50 milliLiter(s) IV Intermittent <User Schedule>  artificial  tears Solution 1 Drop(s) Both EYES every 12 hours  aspirin  chewable 81 milliGRAM(s) Oral daily  ceftolozane/tazobactam IVPB 150 milliGRAM(s) IV Intermittent every 8 hours  dextrose 5%. 1000 milliLiter(s) (50 mL/Hr) IV Continuous <Continuous>  dextrose 5%. 1000 milliLiter(s) (100 mL/Hr) IV Continuous <Continuous>  dextrose 50% Injectable 12.5 Gram(s) IV Push once  dextrose 50% Injectable 25 Gram(s) IV Push once  dextrose 50% Injectable 25 Gram(s) IV Push once  diltiazem    Tablet 60 milliGRAM(s) Oral three times a day  dronabinol 2.5 milliGRAM(s) Oral two times a day before meals  glucagon  Injectable 1 milliGRAM(s) IntraMuscular once  imipramine 50 milliGRAM(s) Oral daily  insulin glargine Injectable (LANTUS) 13 Unit(s) SubCutaneous at bedtime  insulin lispro (ADMELOG) corrective regimen sliding scale   SubCutaneous three times a day before meals  insulin lispro (ADMELOG) corrective regimen sliding scale   SubCutaneous at bedtime  lidocaine   4% Patch 1 Patch Transdermal daily  metoprolol tartrate 100 milliGRAM(s) Oral two times a day  midodrine. 5 milliGRAM(s) Oral every 8 hours  mirtazapine 7.5 milliGRAM(s) Oral at bedtime  pantoprazole    Tablet 40 milliGRAM(s) Oral before breakfast  polyethylene glycol 3350 17 Gram(s) Oral daily  senna 2 Tablet(s) Oral at bedtime  sodium zirconium cyclosilicate 5 Gram(s) Oral daily    MEDICATIONS  (PRN):  acetaminophen     Tablet .. 650 milliGRAM(s) Oral every 6 hours PRN Temp greater or equal to 38C (100.4F), Mild Pain (1 - 3)  dextrose Oral Gel 15 Gram(s) Oral once PRN Blood Glucose LESS THAN 70 milliGRAM(s)/deciliter  traMADol 50 milliGRAM(s) Oral two times a day PRN Severe Pain (7 - 10)  traMADol 25 milliGRAM(s) Oral three times a day PRN Moderate Pain (4 - 6)    Pertinent Labs: 07-17 Na134 mmol/L<L> Glu 222 mg/dL<H> K+ 5.4 mmol/L<H> Cr  7.10 mg/dL<H> BUN 65 mg/dL<H> 07-14 Phos 7.4 mg/dL<H> 07-13 Alb 2.3 g/dL<L>     CAPILLARY BLOOD GLUCOSE      POCT Blood Glucose.: 247 mg/dL (17 Jul 2023 11:37)  POCT Blood Glucose.: 250 mg/dL (17 Jul 2023 07:18)  POCT Blood Glucose.: 251 mg/dL (16 Jul 2023 21:06)  POCT Blood Glucose.: 171 mg/dL (16 Jul 2023 16:42)    Skin: coccyx unstageable     Estimated Needs:   [x ] no change since previous assessment  [ ] recalculated:     Previous Nutrition Diagnosis:   [ ] Inadequate Energy Intake [ ]Inadequate Oral Intake [ ] Excessive Energy Intake   [ ] Underweight [x ] Increased Nutrient Needs [ ] Overweight/Obesity   [ ] Altered GI Function [ ] Unintended Weight Loss [ ] Food & Nutrition Related Knowledge Deficit [x ] Malnutrition     Nutrition Diagnosis is [x ] ongoing  [ ] resolved [ ] not applicable     New Nutrition Diagnosis: [x ] not applicable       Interventions:   Recommend  [ x] Change Diet To: add consistent carb diet restriction 2/2 elevated glucose readings and change liquids from mildly thickened to thin (left message with Dr. Larson)   [ ] Nutrition Supplement  [ ] Nutrition Support  [ x] Other: K+ slightly elevated; monitor levels and need for Renal restriction.     Monitoring and Evaluation:   [ x] PO intake [ x ] Tolerance to diet prescription [ x ] weights [ x ] labs[ x ] follow up per protocol  [ ] other:

## 2023-07-17 NOTE — PROGRESS NOTE ADULT - PROBLEM SELECTOR PROBLEM 8
Pubic ramus fracture
Pleural effusion
Prophylactic measure
Pleural effusion
Prophylactic measure
Right acetabular fracture
Right acetabular fracture
Pleural effusion
Right acetabular fracture
Right acetabular fracture
Pubic ramus fracture

## 2023-07-17 NOTE — PROGRESS NOTE ADULT - ASSESSMENT
73YO F PMH hypertension, diabetes, dementia, end-stage renal disease on hemodialysis, who presented status post unwitnessed fall from [employer]- rolled out of bed. Patient complained of right hip pain--found to have fractures including the medial wall of the acetabulum and inferior right pubic ramus. Patient found to have leukocytosis, initially thought to be reactive 2/2 hip fracture.    7/4: started on empiric cefepime on 7/4 given new fever  7/7: CT chest and A/P done, showed L pleural effusion but no signs of infection  7/8: Broadened to meropenem given ESBL klebsiella in urine  7/11: RRT called for unresponsiveness, CT head showed potential hemorrhage; given worsening leukocytosis to 26 of unclear etiology, started on Zerbaxa to cover CRE pseudomonas in urine  7/12: transferred to ICU  7/13: MRSA nasal PCR positive but low suspicion for MRSA infection, seen by Wound Care and no signs of wound infection  7/14: transferred out of ICU  7/17: no fever, has persistent leukocytosis although WBC stable    #Leukocytosis  #Positive urine culture    -continue ceftolozane-tazobactam (renally dosed) until today then stop  -monitor WBC as outpatient  -aspiration precautions  -wound care  -discussed with Dr. Marsha Tsai MD  Division of infectious Diseases  Cell [phone] between 8am and 6pm  After 6pm and over the weekends please call ID service line at [phone].    71YO F PMH hypertension, diabetes, dementia, end-stage renal disease on hemodialysis, who presented status post unwitnessed fall from [employer]- rolled out of bed. Patient complained of right hip pain--found to have fractures including the medial wall of the acetabulum and inferior right pubic ramus. Patient found to have leukocytosis, initially thought to be reactive 2/2 hip fracture.    7/4: started on empiric cefepime on 7/4 given new fever  7/7: CT chest and A/P done, showed L pleural effusion but no signs of infection  7/8: Broadened to meropenem given ESBL klebsiella in urine  7/11: RRT called for unresponsiveness, CT head showed potential hemorrhage; given worsening leukocytosis to 26 of unclear etiology, started on Zerbaxa to cover CRE pseudomonas in urine  7/12: transferred to ICU  7/13: MRSA nasal PCR positive but low suspicion for MRSA infection, seen by Wound Care and no signs of wound infection  7/14: transferred out of ICU  7/17: has persistent leukocytosis although WBC stable; no fever and on room air, no signs of infection around sacral wound    #Leukocytosis  #Positive urine culture    -continue ceftolozane-tazobactam (renally dosed) until today then stop  -monitor WBC as outpatient  -aspiration precautions  -wound care  -discussed with Dr. Marsha Tsai MD  Division of infectious Diseases  Cell [phone] between 8am and 6pm  After 6pm and over the weekends please call ID service line at [phone].

## 2023-07-17 NOTE — PROGRESS NOTE ADULT - PROBLEM SELECTOR PROBLEM 1
Encephalopathy, unspecified
Type 2 diabetes mellitus
Acute febrile illness
Type 2 diabetes mellitus
Type 2 diabetes mellitus
Acute febrile illness
Type 2 diabetes mellitus
Acute febrile illness
Encephalopathy, unspecified
Fall
Acute febrile illness
Acute febrile illness
Fall
Fall
Acute febrile illness
Encephalopathy, unspecified
Acute febrile illness
Acute febrile illness

## 2023-07-17 NOTE — PROGRESS NOTE ADULT - PROBLEM SELECTOR PLAN 10
Continue Diltiazem and metoprolol
Continue Diltiazem and metoprolol
Gastrointestinal stress ulcer prophylaxis and DVT prophylaxis administered
Continue Diltiazem and metoprolol

## 2023-07-17 NOTE — SOCIAL WORK PROGRESS NOTE - NSSWPROGRESSNOTE_GEN_ALL_CORE
pt for dc today to [facility], LT resident. sw spoke with pts son Fernie [phone], aware and in agreement with plan. sw confirmed available bed with [facility]. updated JAYE, progress notes and HD flow sheets faxed to [facility]. moe coordinated for  at 4 pm with constantin.

## 2023-07-17 NOTE — PROGRESS NOTE ADULT - PROBLEM SELECTOR PLAN 2
Unclear etiology -- resolved   ICH unlikely  Neuro f/u 1.For episode of unresponsiveness and altered mental status, suspect most likely metabolic encephalopathy secondary to underlying infectious type process, possibly urinary tract, also questionable the patient had an event a few weeks ago.  As per previous notes a few months ago, the patient was more verbal and interactive, but as per my conversation with spouse, he states for the last few weeks, she has been speaking less with less interaction, questionable any type of new central nervous system event occurred a few weeks ago.  2.For history of underlying mild cognitive impairment versus subtle dementia secondary to the patient appears to be advanced, would recommend supportive therapy.  3.For history of hypertension, monitor systolic blood pressure, avoid hypotension if possible.  Please maintain a systolic blood pressure between 140 and 100.  4.mri reviewed no significant changes if needed cleared for AC risk vs benefits

## 2023-07-17 NOTE — PROGRESS NOTE ADULT - ASSESSMENT
REASON FOR VISIT  .. Management of problems listed below      NOTEWORTHY FINDINGS.     PHYSICAL EXAM    HEENT Unremarkable  atraumatic   RESP Fair air entry  Harsh breath sound   CARDIAC S1 S2 No S3     NO JVD    ABDOMEN No hepatosplenomegaly   PEDAL EDEMA present No calf tenderness  NO rash       GENERAL DATA .     GOC.    . prior  ICU STAY.   .. 7/11/2023   COVID.   ..    BEST PRACTICE ISSUES.    HOB ELEVATN.   .. Yes  DVT PPLX.   .. 7/10 apixa held for thora  .. 7/3/2023 apixa 2.5 x2 restarted as IR feels we should tap fluid only if she develops shortness of breath  ..    7/2/2023 Apixaba 2.5 x2 held for thorac   STRESS ULCER PPLX.   ..      INFECTION PPLX.   ..  7/11 chlorhexidine 2%  .. 7/12 mupirocin 2%   SP SW AMINAH.    ..      7/13/2023 puree thin  DIET.    .. 7/11 npo   ..  7/4/2023 mince moist dc ed   IV fl.  ..     PROCEDURES.  ..   PAST PROCEDURES.    ..   GENERAL DATA .   ALLGY.  ..     latex                     WT.  ..  7/1/2023 54  BMI.  ..    7/1/2023 17     ABGS.  . 7/11/2023 7p 3l 743/34/182     VS/ PO/IO/ VENT/ DRIPS.   7/17/2023 afeb 70 150/80      CC/DOA.        . 7/1/2023 Pt sent from [facility] for unwitnessed fall out of bed.       .  PRESENTATION.   . 7/1/2023 72-year-old female former smoker quit 3/2012  with PMH for hypertension, CAD sp cabg  PVD   sp R-> L bifem - fem BK pop bypass  Fem pop bypass 6/21/2022  Partial ray amputation r great toe 6/22/2022  diabetes, dementia, ESRD on hemodialysis   a fib on eliquis sp recent hosp stay 5/17-5/24/2023     . ENTERORHINOVIRUS INFECTION RESP TRACT 5/17  . PLEURAL EFFUSION SP l THORA 5/18 TRANSUDATE     COURSE   . ACETABULAR Fx Ortho recommended non surgical mgmt  . PL EFFSN Was initially decided to hold off thora unless she develops sob  . LEUKOCYTOSIS 7/11/2023 dw ID will plan thorac to exclude infecn  . ALTERED MENTAL STATE 7/11 Tr to icu poss ic bleed 7/13 tr out of icu      PROBLEMS/ASSESSMENT/RECOMMENDATIONS (A/R).  PULMONARY.  . GAS EXCHANGE.  .. A/R.   .. Monitor pulse ox and target po 90-95%    . PLEURAL EFFSN.  .. RELEVANT PAST HISTORU  .. 5/18/2023 l thorac 1.5 l   .. 5/18 pl fl l 13 m 73 p 5 afb sm (-) g 131 l 102/268 (.38) ph 7.6 pr 2.6/6.4 (.4)  .. Fluid transudate  .. WORKUP   .. CXR 7/1/2023 cxr Mod large l pl effs or lower zone airspace consolidation/atelecatsis   .. CT ch 7/1/2023 Ct ch   .... Decreased mod l effs since 5/17/2023   .. EVENTS  .. 7/2/2023 DW pt and dw son consensus is that they want fluid remived   .. 7/3/2023 dw ir plan changed plan is to tap if she becomes symptimatic   .. 7/3/2023 Thora cancelled as pt is comfortable will pan thora if she becomes symptomatic   .. 7/10/2023 as pt has persistent leukocytosis if no impovement will need thora so apixa 2.5 bid staoppd   . 7/11/2023  thora diagn orderd  . 7/11 Thora deferred  .. A/R  .. 7/14/2023 Will reorer thora if  ID recommends     ID.  . ESBL UTI.7/3  .. WORKUP.  .. W 7/1-7/2-7/3-7/6-7/7-7/9-7/10-7/11-7/12-7/13-7/15-7/16-7/17/2023   ..W 15 - 14 - 13- 13 - 15- 18 - 19 - 19 - 20- 18  - 17- 16 - 17  .. EVENTS.   .. 7/1/2023 Checlk blood and urine cs   .. MICRO.  .. uc 7/3/2023 100 k pseudomonas carbapenem resist    ..  7/3/2023 50-99 Klebs esbl   .. bc 7/10 (-)   .. mrsa 7/12 (+)   .. ABIO.  .. 7/8/2023 meropenem 500 x 3d Dr ALCAZAR dced   .. 7/11 ceftolozane tazobactam 150.3 zerbaxa x 2 do     . PERSISTENT LEUKOCYTOSIS.  .. w 7/11/2023 w 19  .. ct hip r 7/11/2023   .... no hematoma or fluid collectn   .... r acetabular fc 2.1 x 3.4 cm cortical stepoff   .. EVENTS.  .. 7/11/2023 dw id  will plan thorac   .. 7/11 ceftolozane tazobactam 150.3 zerbaxa x 2 do   .. A/R.  .. 7/12/2023 pt now in icu sec possible ic bleed   .. 7/12/2023 Thoracentesis scheduling will be decided by icu     CARDIO.  . HEMODYNAMICS.  .. 7/8/2023 midodrine 10.3 Dr CLIFFORD   .. target map 65 (+)     . CAD.  .. 7/1/2023 ASA 81   .. A/R  .. cont rx    . A fib.  .. 7/1/2023 CARDIZEM 60.3   .. 7/3/2023 apixaba 2.5 x2   .. 7/10 apixa held  .. AR  .. Cont rx  .. apixa to be restarted when cleared by neuro    HEMAT.  .. WORKUP.  .. Hb 7/1-7/2-7/3-7/6-7/9-7/11-7/12-7/17/2023 Hb 13 - 11.8 - 11.2 - 11- 12 -12.4- 11.4  - 11  .. INR 7/1/2023    .. A/R  .. As pt was on apixaba will monitorserially     RENAL.  . ESRD.  .. WORKUP  .. NA 7/1/2023    .. K 7/1/2023 K 5.1   .. CR 7/1/2023 CR 5  .. HD dependent     ORTHO.  . FALL 7/1/2023.  .. CT head C sp 7/1/2023 CT HC (-)     . FRACTURE ACETABULUM r INFERIOR PUBIC RAMUS r 7/1/2023   .. IMAGING.  .. XR Pelvis and r hip 7/1/2023 XR Fractures r acetabulum and inf r pubic ramus   .. 7/1/2023 ct pelvix   .... comminuted r acetabular fx involving r sup and inf pubic rami medial wallacetabulum sup acetabulum and post wall acetabulum   .... small hematoma r pelvic sidewall   .. ORTHO.  .. 7/1/2023 DW Dr Dias She spoke to Ortho Dr Jenkins group and plan is for nonsurgical management  .. A/R  .. Monitor serial Hb ro bleed     . IC BLEED 7/11.  .. ct head 7/11 cw cta h 7/11   .... 5 mm hyperattenuating curvilinear focus l parietal lobe may be sl increasd from 3 mm on 7/11 520p may represent small focus of hrrge v contrast training in ac infacrt  .. may need transfer tertiary center    .. mr brain no contr 7/12   .... r parietal remote petechial hrrge   .... l parietal petechial hrrge no excluded   .. Follow with neuro     . ALTERED MENTAL STATE 7/11  .. 7/11/2023  Tr to icu poss ic bleed for q h neurochecl   .. 7/13 tr out of icu    . MAIN ISSUES  .. FX acetabulum on conservative rx  . A fib  7/14/2023 doac to be restarted when cleared by neuro   . ESRD   . UTI 7/8/2023 started on meropenem by ID (UTI)   . LEUKOCYTOSIS 7/11/2023 dw ID will plan thorac to exclude infecn  7/11 ceftolozane/tazobactam 150.3 x 2 do thorac deferred   . ALTERED MENTAL STATE 7/11 Tr to icu poss ic bleed tr to tertiary being considered 7/13 tr out of icu      TIME SPENT   . About 36 minutes aggregate care time spent on encounter; activities included   direct patient care, counseling and/or coordinating care reviewing notes, lab data/ imaging , discussion with multidisciplinary team/ patient  /family and explaining in detail risks, benefits, alternatives  of the recommendations     JOSE F LAO 72 f7/1/2023 1948 DR HARRY ALBRIGHT

## 2023-07-17 NOTE — PROGRESS NOTE ADULT - NUTRITIONAL ASSESSMENT
MEDICATIONS  (STANDING):  apixaban 2.5 milliGRAM(s) Oral two times a day  aspirin enteric coated 81 milliGRAM(s) Oral daily  cefepime   IVPB      cefepime   IVPB 1000 milliGRAM(s) IV Intermittent once  cloNIDine 0.1 milliGRAM(s) Oral two times a day  dextrose 5%. 1000 milliLiter(s) (50 mL/Hr) IV Continuous <Continuous>  dextrose 5%. 1000 milliLiter(s) (100 mL/Hr) IV Continuous <Continuous>  dextrose 50% Injectable 12.5 Gram(s) IV Push once  dextrose 50% Injectable 25 Gram(s) IV Push once  dextrose 50% Injectable 25 Gram(s) IV Push once  diltiazem    Tablet 60 milliGRAM(s) Oral three times a day  dronabinol 2.5 milliGRAM(s) Oral two times a day before meals  glucagon  Injectable 1 milliGRAM(s) IntraMuscular once  imipramine 50 milliGRAM(s) Oral daily  insulin lispro (ADMELOG) corrective regimen sliding scale   SubCutaneous three times a day before meals  insulin lispro (ADMELOG) corrective regimen sliding scale   SubCutaneous at bedtime  lidocaine   4% Patch 1 Patch Transdermal daily  metoprolol tartrate 100 milliGRAM(s) Oral two times a day  mirtazapine 7.5 milliGRAM(s) Oral at bedtime  multivitamin 1 Tablet(s) Oral daily  pantoprazole    Tablet 40 milliGRAM(s) Oral before breakfast  polyethylene glycol 3350 17 Gram(s) Oral daily  senna 2 Tablet(s) Oral at bedtime  vancomycin  IVPB 750 milliGRAM(s) IV Intermittent once
MEDICATIONS  (STANDING):  aspirin enteric coated 81 milliGRAM(s) Oral daily  cloNIDine 0.1 milliGRAM(s) Oral two times a day  dextrose 5%. 1000 milliLiter(s) (100 mL/Hr) IV Continuous <Continuous>  dextrose 5%. 1000 milliLiter(s) (50 mL/Hr) IV Continuous <Continuous>  dextrose 50% Injectable 12.5 Gram(s) IV Push once  dextrose 50% Injectable 25 Gram(s) IV Push once  dextrose 50% Injectable 25 Gram(s) IV Push once  diltiazem    Tablet 60 milliGRAM(s) Oral three times a day  dronabinol 2.5 milliGRAM(s) Oral two times a day before meals  glucagon  Injectable 1 milliGRAM(s) IntraMuscular once  imipramine 50 milliGRAM(s) Oral daily  insulin glargine Injectable (LANTUS) 15 Unit(s) SubCutaneous at bedtime  insulin lispro (ADMELOG) corrective regimen sliding scale   SubCutaneous three times a day before meals  insulin lispro (ADMELOG) corrective regimen sliding scale   SubCutaneous at bedtime  lidocaine   4% Patch 1 Patch Transdermal daily  metoprolol tartrate 100 milliGRAM(s) Oral two times a day  mirtazapine 7.5 milliGRAM(s) Oral at bedtime  pantoprazole    Tablet 40 milliGRAM(s) Oral before breakfast  polyethylene glycol 3350 17 Gram(s) Oral daily  senna 2 Tablet(s) Oral at bedtime  sodium zirconium cyclosilicate 5 Gram(s) Oral daily
MEDICATIONS  (STANDING):  apixaban 2.5 milliGRAM(s) Oral two times a day  aspirin enteric coated 81 milliGRAM(s) Oral daily  cefepime   IVPB      cefepime   IVPB 1000 milliGRAM(s) IV Intermittent once  cloNIDine 0.1 milliGRAM(s) Oral two times a day  dextrose 5%. 1000 milliLiter(s) (50 mL/Hr) IV Continuous <Continuous>  dextrose 5%. 1000 milliLiter(s) (100 mL/Hr) IV Continuous <Continuous>  dextrose 50% Injectable 12.5 Gram(s) IV Push once  dextrose 50% Injectable 25 Gram(s) IV Push once  dextrose 50% Injectable 25 Gram(s) IV Push once  diltiazem    Tablet 60 milliGRAM(s) Oral three times a day  dronabinol 2.5 milliGRAM(s) Oral two times a day before meals  glucagon  Injectable 1 milliGRAM(s) IntraMuscular once  imipramine 50 milliGRAM(s) Oral daily  insulin lispro (ADMELOG) corrective regimen sliding scale   SubCutaneous three times a day before meals  insulin lispro (ADMELOG) corrective regimen sliding scale   SubCutaneous at bedtime  lidocaine   4% Patch 1 Patch Transdermal daily  metoprolol tartrate 100 milliGRAM(s) Oral two times a day  mirtazapine 7.5 milliGRAM(s) Oral at bedtime  multivitamin 1 Tablet(s) Oral daily  pantoprazole    Tablet 40 milliGRAM(s) Oral before breakfast  polyethylene glycol 3350 17 Gram(s) Oral daily  senna 2 Tablet(s) Oral at bedtime  vancomycin  IVPB 750 milliGRAM(s) IV Intermittent once
MEDICATIONS  (STANDING):  aspirin enteric coated 81 milliGRAM(s) Oral daily  cloNIDine 0.1 milliGRAM(s) Oral two times a day  dextrose 5%. 1000 milliLiter(s) (100 mL/Hr) IV Continuous <Continuous>  dextrose 5%. 1000 milliLiter(s) (50 mL/Hr) IV Continuous <Continuous>  dextrose 50% Injectable 12.5 Gram(s) IV Push once  dextrose 50% Injectable 25 Gram(s) IV Push once  dextrose 50% Injectable 25 Gram(s) IV Push once  diltiazem    Tablet 60 milliGRAM(s) Oral three times a day  dronabinol 2.5 milliGRAM(s) Oral two times a day before meals  glucagon  Injectable 1 milliGRAM(s) IntraMuscular once  imipramine 50 milliGRAM(s) Oral daily  insulin glargine Injectable (LANTUS) 15 Unit(s) SubCutaneous at bedtime  insulin lispro (ADMELOG) corrective regimen sliding scale   SubCutaneous three times a day before meals  insulin lispro (ADMELOG) corrective regimen sliding scale   SubCutaneous at bedtime  lidocaine   4% Patch 1 Patch Transdermal daily  metoprolol tartrate 100 milliGRAM(s) Oral two times a day  mirtazapine 7.5 milliGRAM(s) Oral at bedtime  pantoprazole    Tablet 40 milliGRAM(s) Oral before breakfast  polyethylene glycol 3350 17 Gram(s) Oral daily  senna 2 Tablet(s) Oral at bedtime  sodium zirconium cyclosilicate 5 Gram(s) Oral daily
This patient has been assessed with a concern for Malnutrition and has been determined to have a diagnosis/diagnoses of Moderate protein-calorie malnutrition.    This patient is being managed with:   Diet NPO-  Except Medications     Special Instructions for Nursing:  Except Medications  Entered: Jul 11 2023  9:32PM  
MEDICATIONS  (STANDING):  apixaban 2.5 milliGRAM(s) Oral two times a day  aspirin enteric coated 81 milliGRAM(s) Oral daily  cefepime   IVPB      cefepime   IVPB 1000 milliGRAM(s) IV Intermittent once  cloNIDine 0.1 milliGRAM(s) Oral two times a day  dextrose 5%. 1000 milliLiter(s) (50 mL/Hr) IV Continuous <Continuous>  dextrose 5%. 1000 milliLiter(s) (100 mL/Hr) IV Continuous <Continuous>  dextrose 50% Injectable 12.5 Gram(s) IV Push once  dextrose 50% Injectable 25 Gram(s) IV Push once  dextrose 50% Injectable 25 Gram(s) IV Push once  diltiazem    Tablet 60 milliGRAM(s) Oral three times a day  dronabinol 2.5 milliGRAM(s) Oral two times a day before meals  glucagon  Injectable 1 milliGRAM(s) IntraMuscular once  imipramine 50 milliGRAM(s) Oral daily  insulin lispro (ADMELOG) corrective regimen sliding scale   SubCutaneous three times a day before meals  insulin lispro (ADMELOG) corrective regimen sliding scale   SubCutaneous at bedtime  lidocaine   4% Patch 1 Patch Transdermal daily  metoprolol tartrate 100 milliGRAM(s) Oral two times a day  mirtazapine 7.5 milliGRAM(s) Oral at bedtime  multivitamin 1 Tablet(s) Oral daily  pantoprazole    Tablet 40 milliGRAM(s) Oral before breakfast  polyethylene glycol 3350 17 Gram(s) Oral daily  senna 2 Tablet(s) Oral at bedtime  vancomycin  IVPB 750 milliGRAM(s) IV Intermittent once
MEDICATIONS  (STANDING):  aspirin enteric coated 81 milliGRAM(s) Oral daily  cloNIDine 0.1 milliGRAM(s) Oral two times a day  dextrose 5%. 1000 milliLiter(s) (100 mL/Hr) IV Continuous <Continuous>  dextrose 5%. 1000 milliLiter(s) (50 mL/Hr) IV Continuous <Continuous>  dextrose 50% Injectable 12.5 Gram(s) IV Push once  dextrose 50% Injectable 25 Gram(s) IV Push once  dextrose 50% Injectable 25 Gram(s) IV Push once  diltiazem    Tablet 60 milliGRAM(s) Oral three times a day  dronabinol 2.5 milliGRAM(s) Oral two times a day before meals  glucagon  Injectable 1 milliGRAM(s) IntraMuscular once  imipramine 50 milliGRAM(s) Oral daily  insulin glargine Injectable (LANTUS) 15 Unit(s) SubCutaneous at bedtime  insulin lispro (ADMELOG) corrective regimen sliding scale   SubCutaneous three times a day before meals  insulin lispro (ADMELOG) corrective regimen sliding scale   SubCutaneous at bedtime  lidocaine   4% Patch 1 Patch Transdermal daily  metoprolol tartrate 100 milliGRAM(s) Oral two times a day  mirtazapine 7.5 milliGRAM(s) Oral at bedtime  pantoprazole    Tablet 40 milliGRAM(s) Oral before breakfast  polyethylene glycol 3350 17 Gram(s) Oral daily  senna 2 Tablet(s) Oral at bedtime  sodium zirconium cyclosilicate 5 Gram(s) Oral daily
MEDICATIONS  (STANDING):  apixaban 2.5 milliGRAM(s) Oral two times a day  aspirin enteric coated 81 milliGRAM(s) Oral daily  cefepime   IVPB      cefepime   IVPB 1000 milliGRAM(s) IV Intermittent once  cloNIDine 0.1 milliGRAM(s) Oral two times a day  dextrose 5%. 1000 milliLiter(s) (50 mL/Hr) IV Continuous <Continuous>  dextrose 5%. 1000 milliLiter(s) (100 mL/Hr) IV Continuous <Continuous>  dextrose 50% Injectable 12.5 Gram(s) IV Push once  dextrose 50% Injectable 25 Gram(s) IV Push once  dextrose 50% Injectable 25 Gram(s) IV Push once  diltiazem    Tablet 60 milliGRAM(s) Oral three times a day  dronabinol 2.5 milliGRAM(s) Oral two times a day before meals  glucagon  Injectable 1 milliGRAM(s) IntraMuscular once  imipramine 50 milliGRAM(s) Oral daily  insulin lispro (ADMELOG) corrective regimen sliding scale   SubCutaneous three times a day before meals  insulin lispro (ADMELOG) corrective regimen sliding scale   SubCutaneous at bedtime  lidocaine   4% Patch 1 Patch Transdermal daily  metoprolol tartrate 100 milliGRAM(s) Oral two times a day  mirtazapine 7.5 milliGRAM(s) Oral at bedtime  multivitamin 1 Tablet(s) Oral daily  pantoprazole    Tablet 40 milliGRAM(s) Oral before breakfast  polyethylene glycol 3350 17 Gram(s) Oral daily  senna 2 Tablet(s) Oral at bedtime  vancomycin  IVPB 750 milliGRAM(s) IV Intermittent once
MEDICATIONS  (STANDING):  apixaban 2.5 milliGRAM(s) Oral two times a day  aspirin enteric coated 81 milliGRAM(s) Oral daily  cefepime   IVPB      cefepime   IVPB 1000 milliGRAM(s) IV Intermittent once  cloNIDine 0.1 milliGRAM(s) Oral two times a day  dextrose 5%. 1000 milliLiter(s) (50 mL/Hr) IV Continuous <Continuous>  dextrose 5%. 1000 milliLiter(s) (100 mL/Hr) IV Continuous <Continuous>  dextrose 50% Injectable 12.5 Gram(s) IV Push once  dextrose 50% Injectable 25 Gram(s) IV Push once  dextrose 50% Injectable 25 Gram(s) IV Push once  diltiazem    Tablet 60 milliGRAM(s) Oral three times a day  dronabinol 2.5 milliGRAM(s) Oral two times a day before meals  glucagon  Injectable 1 milliGRAM(s) IntraMuscular once  imipramine 50 milliGRAM(s) Oral daily  insulin lispro (ADMELOG) corrective regimen sliding scale   SubCutaneous three times a day before meals  insulin lispro (ADMELOG) corrective regimen sliding scale   SubCutaneous at bedtime  lidocaine   4% Patch 1 Patch Transdermal daily  metoprolol tartrate 100 milliGRAM(s) Oral two times a day  mirtazapine 7.5 milliGRAM(s) Oral at bedtime  multivitamin 1 Tablet(s) Oral daily  pantoprazole    Tablet 40 milliGRAM(s) Oral before breakfast  polyethylene glycol 3350 17 Gram(s) Oral daily  senna 2 Tablet(s) Oral at bedtime  vancomycin  IVPB 750 milliGRAM(s) IV Intermittent once
MEDICATIONS  (STANDING):  apixaban 2.5 milliGRAM(s) Oral two times a day  aspirin enteric coated 81 milliGRAM(s) Oral daily  cloNIDine 0.1 milliGRAM(s) Oral two times a day  dextrose 5%. 1000 milliLiter(s) (100 mL/Hr) IV Continuous <Continuous>  dextrose 5%. 1000 milliLiter(s) (50 mL/Hr) IV Continuous <Continuous>  dextrose 50% Injectable 12.5 Gram(s) IV Push once  dextrose 50% Injectable 25 Gram(s) IV Push once  dextrose 50% Injectable 25 Gram(s) IV Push once  diltiazem    Tablet 60 milliGRAM(s) Oral three times a day  glucagon  Injectable 1 milliGRAM(s) IntraMuscular once  imipramine 50 milliGRAM(s) Oral daily  insulin glargine Injectable (LANTUS) 8 Unit(s) SubCutaneous at bedtime  insulin lispro (ADMELOG) corrective regimen sliding scale   SubCutaneous three times a day before meals  insulin lispro (ADMELOG) corrective regimen sliding scale   SubCutaneous at bedtime  lidocaine   4% Patch 1 Patch Transdermal daily  meropenem  IVPB 500 milliGRAM(s) IV Intermittent every 24 hours  metoprolol tartrate 100 milliGRAM(s) Oral two times a day  mirtazapine 7.5 milliGRAM(s) Oral at bedtime  pantoprazole    Tablet 40 milliGRAM(s) Oral before breakfast  polyethylene glycol 3350 17 Gram(s) Oral daily  senna 2 Tablet(s) Oral at bedtime
MEDICATIONS  (STANDING):  apixaban 2.5 milliGRAM(s) Oral two times a day  aspirin enteric coated 81 milliGRAM(s) Oral daily  cloNIDine 0.1 milliGRAM(s) Oral two times a day  dextrose 5%. 1000 milliLiter(s) (100 mL/Hr) IV Continuous <Continuous>  dextrose 5%. 1000 milliLiter(s) (50 mL/Hr) IV Continuous <Continuous>  dextrose 50% Injectable 12.5 Gram(s) IV Push once  dextrose 50% Injectable 25 Gram(s) IV Push once  dextrose 50% Injectable 25 Gram(s) IV Push once  diltiazem    Tablet 60 milliGRAM(s) Oral three times a day  glucagon  Injectable 1 milliGRAM(s) IntraMuscular once  imipramine 50 milliGRAM(s) Oral daily  insulin glargine Injectable (LANTUS) 8 Unit(s) SubCutaneous at bedtime  insulin lispro (ADMELOG) corrective regimen sliding scale   SubCutaneous three times a day before meals  insulin lispro (ADMELOG) corrective regimen sliding scale   SubCutaneous at bedtime  lidocaine   4% Patch 1 Patch Transdermal daily  meropenem  IVPB 500 milliGRAM(s) IV Intermittent every 24 hours  metoprolol tartrate 100 milliGRAM(s) Oral two times a day  mirtazapine 7.5 milliGRAM(s) Oral at bedtime  pantoprazole    Tablet 40 milliGRAM(s) Oral before breakfast  polyethylene glycol 3350 17 Gram(s) Oral daily  senna 2 Tablet(s) Oral at bedtime
MEDICATIONS  (STANDING):  aspirin enteric coated 81 milliGRAM(s) Oral daily  cloNIDine 0.1 milliGRAM(s) Oral two times a day  dextrose 5%. 1000 milliLiter(s) (100 mL/Hr) IV Continuous <Continuous>  dextrose 5%. 1000 milliLiter(s) (50 mL/Hr) IV Continuous <Continuous>  dextrose 50% Injectable 12.5 Gram(s) IV Push once  dextrose 50% Injectable 25 Gram(s) IV Push once  dextrose 50% Injectable 25 Gram(s) IV Push once  diltiazem    Tablet 60 milliGRAM(s) Oral three times a day  dronabinol 2.5 milliGRAM(s) Oral two times a day before meals  glucagon  Injectable 1 milliGRAM(s) IntraMuscular once  imipramine 50 milliGRAM(s) Oral daily  insulin glargine Injectable (LANTUS) 15 Unit(s) SubCutaneous at bedtime  insulin lispro (ADMELOG) corrective regimen sliding scale   SubCutaneous three times a day before meals  insulin lispro (ADMELOG) corrective regimen sliding scale   SubCutaneous at bedtime  lidocaine   4% Patch 1 Patch Transdermal daily  metoprolol tartrate 100 milliGRAM(s) Oral two times a day  mirtazapine 7.5 milliGRAM(s) Oral at bedtime  pantoprazole    Tablet 40 milliGRAM(s) Oral before breakfast  polyethylene glycol 3350 17 Gram(s) Oral daily  senna 2 Tablet(s) Oral at bedtime  sodium zirconium cyclosilicate 5 Gram(s) Oral daily
This patient has been assessed with a concern for Malnutrition and has been determined to have a diagnosis/diagnoses of Moderate protein-calorie malnutrition.    This patient is being managed with:   Diet NPO-  Except Medications     Special Instructions for Nursing:  Except Medications  Entered: Jul 11 2023  9:32PM  
This patient has been assessed with a concern for Malnutrition and has been determined to have a diagnosis/diagnoses of Moderate protein-calorie malnutrition.    This patient is being managed with:   Diet Minced and Moist-  Consistent Carbohydrate {Evening Snack}  Moderately Thick Liquids (MODTHICKLIQS)  For patients receiving Renal Replacement - No Protein Restr No Conc K No Conc Phos Low Sodium (RENAL)  Supplement Feeding Modality:  Oral  Nepro Cans or Servings Per Day:  1       Frequency:  Daily  Entered: Jul 4 2023  1:16PM  
This patient has been assessed with a concern for Malnutrition and has been determined to have a diagnosis/diagnoses of Moderate protein-calorie malnutrition.    This patient is being managed with:   Diet Minced and Moist-  Consistent Carbohydrate {Evening Snack}  Moderately Thick Liquids (MODTHICKLIQS)  For patients receiving Renal Replacement - No Protein Restr No Conc K No Conc Phos Low Sodium (RENAL)  Supplement Feeding Modality:  Oral  Nepro Cans or Servings Per Day:  1       Frequency:  Daily  Entered: Jul 4 2023  1:16PM  
This patient has been assessed with a concern for Malnutrition and has been determined to have a diagnosis/diagnoses of Moderate protein-calorie malnutrition.    This patient is being managed with:   Diet Minced and Moist-  Consistent Carbohydrate {Evening Snack}  Mildly Thick Liquids (MILDTHICKLIQS)  For patients receiving Renal Replacement - No Protein Restr No Conc K No Conc Phos Low Sodium (RENAL)  Supplement Feeding Modality:  Oral  Nepro Cans or Servings Per Day:  1       Frequency:  Daily  Entered: Jul  3 2023  3:57PM  
This patient has been assessed with a concern for Malnutrition and has been determined to have a diagnosis/diagnoses of Moderate protein-calorie malnutrition.    This patient is being managed with:   Diet Pureed-  Moderately Thick Liquids (MODTHICKLIQS)  Entered: Jul 13 2023  3:22PM  
This patient has been assessed with a concern for Malnutrition and has been determined to have a diagnosis/diagnoses of Moderate protein-calorie malnutrition.    This patient is being managed with:   Diet NPO-  Except Medications     Special Instructions for Nursing:  Except Medications  Entered: Jul 11 2023  9:32PM  
This patient has been assessed with a concern for Malnutrition and has been determined to have a diagnosis/diagnoses of Moderate protein-calorie malnutrition.    This patient is being managed with:   Diet Minced and Moist-  Consistent Carbohydrate {Evening Snack}  Moderately Thick Liquids (MODTHICKLIQS)  For patients receiving Renal Replacement - No Protein Restr No Conc K No Conc Phos Low Sodium (RENAL)  Supplement Feeding Modality:  Oral  Nepro Cans or Servings Per Day:  1       Frequency:  Daily  Entered: Jul 4 2023  1:16PM  
This patient has been assessed with a concern for Malnutrition and has been determined to have a diagnosis/diagnoses of Moderate protein-calorie malnutrition.    This patient is being managed with:   Diet Pureed-  Moderately Thick Liquids (MODTHICKLIQS)  Entered: Jul 13 2023  3:22PM  
This patient has been assessed with a concern for Malnutrition and has been determined to have a diagnosis/diagnoses of Moderate protein-calorie malnutrition.    This patient is being managed with:   Diet Pureed-  Moderately Thick Liquids (MODTHICKLIQS)  Entered: Jul 13 2023  3:22PM  
This patient has been assessed with a concern for Malnutrition and has been determined to have a diagnosis/diagnoses of Moderate protein-calorie malnutrition.    This patient is being managed with:   Diet Minced and Moist-  Consistent Carbohydrate {Evening Snack}  Moderately Thick Liquids (MODTHICKLIQS)  For patients receiving Renal Replacement - No Protein Restr No Conc K No Conc Phos Low Sodium (RENAL)  Supplement Feeding Modality:  Oral  Nepro Cans or Servings Per Day:  1       Frequency:  Daily  Entered: Jul 4 2023  1:16PM  
This patient has been assessed with a concern for Malnutrition and has been determined to have a diagnosis/diagnoses of Moderate protein-calorie malnutrition.    This patient is being managed with:   Diet Minced and Moist-  Consistent Carbohydrate {Evening Snack}  Mildly Thick Liquids (MILDTHICKLIQS)  For patients receiving Renal Replacement - No Protein Restr No Conc K No Conc Phos Low Sodium (RENAL)  Supplement Feeding Modality:  Oral  Nepro Cans or Servings Per Day:  1       Frequency:  Daily  Entered: Jul  3 2023  3:57PM  
This patient has been assessed with a concern for Malnutrition and has been determined to have a diagnosis/diagnoses of Moderate protein-calorie malnutrition.    This patient is being managed with:   Diet Minced and Moist-  Consistent Carbohydrate {Evening Snack}  Moderately Thick Liquids (MODTHICKLIQS)  For patients receiving Renal Replacement - No Protein Restr No Conc K No Conc Phos Low Sodium (RENAL)  Supplement Feeding Modality:  Oral  Nepro Cans or Servings Per Day:  1       Frequency:  Daily  Entered: Jul 4 2023  1:16PM  
This patient has been assessed with a concern for Malnutrition and has been determined to have a diagnosis/diagnoses of Moderate protein-calorie malnutrition.    This patient is being managed with:   Diet Minced and Moist-  Consistent Carbohydrate {Evening Snack}  Moderately Thick Liquids (MODTHICKLIQS)  For patients receiving Renal Replacement - No Protein Restr No Conc K No Conc Phos Low Sodium (RENAL)  Supplement Feeding Modality:  Oral  Nepro Cans or Servings Per Day:  1       Frequency:  Daily  Entered: Jul 4 2023  1:16PM  
This patient has been assessed with a concern for Malnutrition and has been determined to have a diagnosis/diagnoses of Moderate protein-calorie malnutrition.    This patient is being managed with:   Diet Pureed-  Moderately Thick Liquids (MODTHICKLIQS)  Entered: Jul 13 2023  3:22PM  
This patient has been assessed with a concern for Malnutrition and has been determined to have a diagnosis/diagnoses of Moderate protein-calorie malnutrition.    This patient is being managed with:   Diet Minced and Moist-  Consistent Carbohydrate {Evening Snack}  Moderately Thick Liquids (MODTHICKLIQS)  For patients receiving Renal Replacement - No Protein Restr No Conc K No Conc Phos Low Sodium (RENAL)  Supplement Feeding Modality:  Oral  Nepro Cans or Servings Per Day:  1       Frequency:  Daily  Entered: Jul 4 2023  1:16PM  
This patient has been assessed with a concern for Malnutrition and has been determined to have a diagnosis/diagnoses of Moderate protein-calorie malnutrition.    This patient is being managed with:   Diet Soft and Bite Sized-  Consistent Carbohydrate {No Snacks}  Entered: Jul 17 2023  1:09PM    Diet Soft and Bite Sized-  Mildly Thick Liquids (MILDTHICKLIQS)  Entered: Jul 16 2023  2:43PM    The following pending diet order is being considered for treatment of Moderate protein-calorie malnutrition:null

## 2023-07-17 NOTE — CHART NOTE - NSCHARTNOTESELECT_GEN_ALL_CORE
rapid response/Event Note
Event Note
Nutrition Services
Rapid Response
Rapid Response

## 2023-07-17 NOTE — PROGRESS NOTE ADULT - SUBJECTIVE AND OBJECTIVE BOX
[facility]  INFECTIOUS DISEASES - [address], [place] [address]  Tel: [phone]     Fax: [phone]  =======================================================    SHILO KOHLER [id number]    Follow up: No fevers. Denies any pain or SOB. No diarrhea per RN.    Allergies:  No Known Drug Allergies  latex (Hives)      Antibiotics:  acetaminophen     Tablet .. 650 milliGRAM(s) Oral every 6 hours PRN  albumin human 25% IVPB 50 milliLiter(s) IV Intermittent <User Schedule>  artificial  tears Solution 1 Drop(s) Both EYES every 12 hours  aspirin  chewable 81 milliGRAM(s) Oral daily  dextrose 5%. 1000 milliLiter(s) IV Continuous <Continuous>  dextrose 5%. 1000 milliLiter(s) IV Continuous <Continuous>  dextrose 50% Injectable 12.5 Gram(s) IV Push once  dextrose 50% Injectable 25 Gram(s) IV Push once  dextrose 50% Injectable 25 Gram(s) IV Push once  dextrose Oral Gel 15 Gram(s) Oral once PRN  diltiazem    Tablet 60 milliGRAM(s) Oral three times a day  dronabinol 2.5 milliGRAM(s) Oral two times a day before meals  glucagon  Injectable 1 milliGRAM(s) IntraMuscular once  imipramine 50 milliGRAM(s) Oral daily  insulin glargine Injectable (LANTUS) 13 Unit(s) SubCutaneous at bedtime  insulin lispro (ADMELOG) corrective regimen sliding scale   SubCutaneous three times a day before meals  insulin lispro (ADMELOG) corrective regimen sliding scale   SubCutaneous at bedtime  lidocaine   4% Patch 1 Patch Transdermal daily  metoprolol tartrate 100 milliGRAM(s) Oral two times a day  midodrine. 5 milliGRAM(s) Oral every 8 hours  mirtazapine 7.5 milliGRAM(s) Oral at bedtime  pantoprazole    Tablet 40 milliGRAM(s) Oral before breakfast  polyethylene glycol 3350 17 Gram(s) Oral daily  senna 2 Tablet(s) Oral at bedtime  sodium zirconium cyclosilicate 5 Gram(s) Oral daily  traMADol 25 milliGRAM(s) Oral three times a day PRN  traMADol 50 milliGRAM(s) Oral two times a day PRN       REVIEW OF SYSTEMS: *limited 2/2 dementia*  CONSTITUTIONAL:  No Fevers  CARDIOVASCULAR:  No chest pain   RESPIRATORY:  No cough, shortness of breath  GASTROINTESTINAL:  No abdominal pain  MUSCULOSKELETAL:  no back pain  NEUROLOGIC:  No headache      Physical Exam:  ICU Vital Signs Last 24 Hrs  T(C): 36.6 (17 Jul 2023 11:49), Max: 36.9 (16 Jul 2023 20:22)  T(F): 97.9 (17 Jul 2023 11:49), Max: 98.5 (16 Jul 2023 20:22)  HR: 74 (17 Jul 2023 13:01) (74 - 92)  BP: 150/84 (17 Jul 2023 13:01) (145/59 - 178/65)  BP(mean): --  ABP: --  ABP(mean): --  RR: 20 (17 Jul 2023 11:49) (17 - 20)  SpO2: 93% (17 Jul 2023 11:49) (91% - 98%)    O2 Parameters below as of 17 Jul 2023 11:49  Patient On (Oxygen Delivery Method): room air      GEN: NAD  HEENT: normocephalic and atraumatic.  NECK: Supple.  No lymphadenopathy   LUNGS: Normal respiratory effort  HEART: Regular rate and rhythm   ABDOMEN: Soft, nontender, and nondistended.    EXTREMITIES: No leg edema.   SKIN: (+) ulcer on coccyx  NEURO: AO x 2, says he's in the hospital    Labs:  07-17    134<L>  |  98  |  65<H>  ----------------------------<  222<H>  5.4<H>   |  23  |  7.10<H>    Ca    9.6      17 Jul 2023 06:50  Mg     2.5     07-17                            11.0   17.48 )-----------( 449      ( 17 Jul 2023 06:50 )             34.5       Urinalysis Basic - ( 17 Jul 2023 06:50 )    Color: x / Appearance: x / SG: x / pH: x  Gluc: 222 mg/dL / Ketone: x  / Bili: x / Urobili: x   Blood: x / Protein: x / Nitrite: x   Leuk Esterase: x / RBC: x / WBC x   Sq Epi: x / Non Sq Epi: x / Bacteria: x          RECENT CULTURES:  07-10 @ 16:25 .Blood Blood     No growth at 5 days        07-10 @ 16:20 .Blood Blood     No growth at 5 days        07-03 @ 21:20 Clean Catch Clean Catch (Midstream) Pseudomonas aeruginosa (Carbapenem Resistant)  Klebsiella pneumoniae ESBL    >100,000 CFU/ml Pseudomonas aeruginosa (Carbapenem Resistant)  50,000 - 99,000 CFU/mL Klebsiella pneumoniae ESBL        07-03 @ 16:10 .Blood Blood     No growth at 5 days        07-03 @ 16:05 .Blood Blood     No growth at 5 days              All imaging and data are reviewed.    [facility]  INFECTIOUS DISEASES - [address], [place] [address]  Tel: [phone]     Fax: [phone]  =======================================================    SHILO KOHLER [id number]    Follow up: No fevers. Denies any pain or SOB. No diarrhea per RN.    Allergies:  No Known Drug Allergies  latex (Hives)      Antibiotics:  acetaminophen     Tablet .. 650 milliGRAM(s) Oral every 6 hours PRN  albumin human 25% IVPB 50 milliLiter(s) IV Intermittent <User Schedule>  artificial  tears Solution 1 Drop(s) Both EYES every 12 hours  aspirin  chewable 81 milliGRAM(s) Oral daily  dextrose 5%. 1000 milliLiter(s) IV Continuous <Continuous>  dextrose 5%. 1000 milliLiter(s) IV Continuous <Continuous>  dextrose 50% Injectable 12.5 Gram(s) IV Push once  dextrose 50% Injectable 25 Gram(s) IV Push once  dextrose 50% Injectable 25 Gram(s) IV Push once  dextrose Oral Gel 15 Gram(s) Oral once PRN  diltiazem    Tablet 60 milliGRAM(s) Oral three times a day  dronabinol 2.5 milliGRAM(s) Oral two times a day before meals  glucagon  Injectable 1 milliGRAM(s) IntraMuscular once  imipramine 50 milliGRAM(s) Oral daily  insulin glargine Injectable (LANTUS) 13 Unit(s) SubCutaneous at bedtime  insulin lispro (ADMELOG) corrective regimen sliding scale   SubCutaneous three times a day before meals  insulin lispro (ADMELOG) corrective regimen sliding scale   SubCutaneous at bedtime  lidocaine   4% Patch 1 Patch Transdermal daily  metoprolol tartrate 100 milliGRAM(s) Oral two times a day  midodrine. 5 milliGRAM(s) Oral every 8 hours  mirtazapine 7.5 milliGRAM(s) Oral at bedtime  pantoprazole    Tablet 40 milliGRAM(s) Oral before breakfast  polyethylene glycol 3350 17 Gram(s) Oral daily  senna 2 Tablet(s) Oral at bedtime  sodium zirconium cyclosilicate 5 Gram(s) Oral daily  traMADol 25 milliGRAM(s) Oral three times a day PRN  traMADol 50 milliGRAM(s) Oral two times a day PRN       REVIEW OF SYSTEMS: *limited 2/2 dementia*  CONSTITUTIONAL:  No Fevers  CARDIOVASCULAR:  No chest pain   RESPIRATORY:  No cough, shortness of breath  GASTROINTESTINAL:  No abdominal pain  MUSCULOSKELETAL:  no back pain  NEUROLOGIC:  No headache      Physical Exam:  ICU Vital Signs Last 24 Hrs  T(C): 36.6 (17 Jul 2023 11:49), Max: 36.9 (16 Jul 2023 20:22)  T(F): 97.9 (17 Jul 2023 11:49), Max: 98.5 (16 Jul 2023 20:22)  HR: 74 (17 Jul 2023 13:01) (74 - 92)  BP: 150/84 (17 Jul 2023 13:01) (145/59 - 178/65)  BP(mean): --  ABP: --  ABP(mean): --  RR: 20 (17 Jul 2023 11:49) (17 - 20)  SpO2: 93% (17 Jul 2023 11:49) (91% - 98%)    O2 Parameters below as of 17 Jul 2023 11:49  Patient On (Oxygen Delivery Method): room air      GEN: NAD  HEENT: normocephalic and atraumatic.  NECK: Supple.  No lymphadenopathy   LUNGS: Normal respiratory effort  HEART: Regular rate and rhythm   ABDOMEN: Soft, nontender, and nondistended.    EXTREMITIES: No leg edema.   SKIN: (+) ulcer on coccyx--no drainage, odor or surrounding erythema  NEURO: AO x 2, says he's in the hospital    Labs:  07-17    134<L>  |  98  |  65<H>  ----------------------------<  222<H>  5.4<H>   |  23  |  7.10<H>    Ca    9.6      17 Jul 2023 06:50  Mg     2.5     07-17                            11.0   17.48 )-----------( 449      ( 17 Jul 2023 06:50 )             34.5       Urinalysis Basic - ( 17 Jul 2023 06:50 )    Color: x / Appearance: x / SG: x / pH: x  Gluc: 222 mg/dL / Ketone: x  / Bili: x / Urobili: x   Blood: x / Protein: x / Nitrite: x   Leuk Esterase: x / RBC: x / WBC x   Sq Epi: x / Non Sq Epi: x / Bacteria: x          RECENT CULTURES:  07-10 @ 16:25 .Blood Blood     No growth at 5 days        07-10 @ 16:20 .Blood Blood     No growth at 5 days        07-03 @ 21:20 Clean Catch Clean Catch (Midstream) Pseudomonas aeruginosa (Carbapenem Resistant)  Klebsiella pneumoniae ESBL    >100,000 CFU/ml Pseudomonas aeruginosa (Carbapenem Resistant)  50,000 - 99,000 CFU/mL Klebsiella pneumoniae ESBL        07-03 @ 16:10 .Blood Blood     No growth at 5 days        07-03 @ 16:05 .Blood Blood     No growth at 5 days              All imaging and data are reviewed.

## 2023-07-17 NOTE — PROGRESS NOTE ADULT - SUBJECTIVE AND OBJECTIVE BOX
Patient is a 72y Female whom presented to the hospital with esrd on hd     PAST MEDICAL & SURGICAL HISTORY:  HTN (hypertension)      h/o Anxiety attack      Depression      h/o Myocardial infarct 2007      h/o Hepatitis A 1969  currently resolved, no symptoms      Murmur, cardiac      h/o Smoking  quitted 3/2012      Anemia secondary to renal failure      ESRD on dialysis      Falls      Ataxia      Type 2 diabetes mellitus      Peripheral vascular disease, unspecified      CAD (coronary artery disease)      Diabetes      coronary stent 2007      s/p Ovarian cyst removal      s/p surgical removal of benign Skin lesion epigastric area      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 650 milliGRAM(s) Oral every 6 hours  aspirin enteric coated 81 milliGRAM(s) Oral daily  cloNIDine 0.1 milliGRAM(s) Oral two times a day  dextrose 5%. 1000 milliLiter(s) (50 mL/Hr) IV Continuous <Continuous>  dextrose 5%. 1000 milliLiter(s) (100 mL/Hr) IV Continuous <Continuous>  dextrose 50% Injectable 25 Gram(s) IV Push once  dextrose 50% Injectable 12.5 Gram(s) IV Push once  dextrose 50% Injectable 25 Gram(s) IV Push once  diltiazem    Tablet 60 milliGRAM(s) Oral three times a day  dronabinol 2.5 milliGRAM(s) Oral two times a day before meals  glucagon  Injectable 1 milliGRAM(s) IntraMuscular once  imipramine 50 milliGRAM(s) Oral daily  insulin lispro (ADMELOG) corrective regimen sliding scale   SubCutaneous three times a day before meals  metoprolol tartrate 100 milliGRAM(s) Oral two times a day  mirtazapine 7.5 milliGRAM(s) Oral at bedtime  multivitamin 1 Tablet(s) Oral daily  pantoprazole    Tablet 40 milliGRAM(s) Oral before breakfast  senna 2 Tablet(s) Oral at bedtime  sodium chloride 0.45%. 1000 milliLiter(s) (50 mL/Hr) IV Continuous <Continuous>      Allergies    No Known Drug Allergies  latex (Hives)                                  SOCIAL HISTORY:  Denies ETOh,Smoking,     FAMILY HISTORY:  FH: myocardial infarction (Father)    FH: myocardial infarction (Father)    Family history of type 2 diabetes mellitus in brother (Sibling)        REVIEW OF SYSTEMS:    unable to obtained a good review system                                                                         11.0   17.48 )-----------( 449      ( 17 Jul 2023 06:50 )             34.5       CBC Full  -  ( 17 Jul 2023 06:50 )  WBC Count : 17.48 K/uL  RBC Count : 3.48 M/uL  Hemoglobin : 11.0 g/dL  Hematocrit : 34.5 %  Platelet Count - Automated : 449 K/uL  Mean Cell Volume : 99.1 fl  Mean Cell Hemoglobin : 31.6 pg  Mean Cell Hemoglobin Concentration : 31.9 gm/dL  Auto Neutrophil # : x  Auto Lymphocyte # : x  Auto Monocyte # : x  Auto Eosinophil # : x  Auto Basophil # : x  Auto Neutrophil % : x  Auto Lymphocyte % : x  Auto Monocyte % : x  Auto Eosinophil % : x  Auto Basophil % : x      07-17    134<L>  |  98  |  65<H>  ----------------------------<  222<H>  5.4<H>   |  23  |  7.10<H>    Ca    9.6      17 Jul 2023 06:50  Mg     2.5     07-17        CAPILLARY BLOOD GLUCOSE      POCT Blood Glucose.: 247 mg/dL (17 Jul 2023 11:37)  POCT Blood Glucose.: 250 mg/dL (17 Jul 2023 07:18)  POCT Blood Glucose.: 251 mg/dL (16 Jul 2023 21:06)  POCT Blood Glucose.: 171 mg/dL (16 Jul 2023 16:42)      Vital Signs Last 24 Hrs  T(C): 36.6 (17 Jul 2023 11:49), Max: 36.9 (16 Jul 2023 20:22)  T(F): 97.9 (17 Jul 2023 11:49), Max: 98.5 (16 Jul 2023 20:22)  HR: 74 (17 Jul 2023 13:01) (74 - 92)  BP: 150/84 (17 Jul 2023 13:01) (145/59 - 178/65)  BP(mean): --  RR: 20 (17 Jul 2023 11:49) (17 - 20)  SpO2: 93% (17 Jul 2023 11:49) (91% - 98%)    Parameters below as of 17 Jul 2023 11:49  Patient On (Oxygen Delivery Method): room air        Urinalysis Basic - ( 17 Jul 2023 06:50 )    Color: x / Appearance: x / SG: x / pH: x  Gluc: 222 mg/dL / Ketone: x  / Bili: x / Urobili: x   Blood: x / Protein: x / Nitrite: x   Leuk Esterase: x / RBC: x / WBC x   Sq Epi: x / Non Sq Epi: x / Bacteria: x                  PHYSICAL EXAM:    Constitutional: NAD  HEENT: conjunctive   clear   Neck:  No JVD  Respiratory: CTAB  Cardiovascular: S1 and S2  Gastrointestinal: BS+, soft, NT/ND  Extremities: No peripheral edema  Neurological:  no focal deficits  pos fistula

## 2023-07-17 NOTE — PROGRESS NOTE ADULT - PROBLEM SELECTOR PROBLEM 5
Type 2 diabetes mellitus
Type 2 diabetes mellitus
Right acetabular fracture
Fall
Right acetabular fracture
Right acetabular fracture
Type 2 diabetes mellitus
Fall
HTN (hypertension)
Fall
HTN (hypertension)
Type 2 diabetes mellitus
HTN (hypertension)
Fall
Type 2 diabetes mellitus
Type 2 diabetes mellitus

## 2023-07-17 NOTE — SOCIAL WORK PROGRESS NOTE - NSSWPROGRESSNOTE_GEN_ALL_CORE
moe delayed until 7pm as pt will receive last dose of IV antibiotics here at 6 pm. sixto confirmed new  time with [facility].

## 2023-07-17 NOTE — DISCHARGE NOTE NURSING/CASE MANAGEMENT/SOCIAL WORK - PATIENT PORTAL LINK FT
You can access the FollowMyHealth Patient Portal offered by [facility] by registering at the following website: [email or web address]. By joining "[email or web address]’s FollowMyHealth portal, you will also be able to view your health information using other applications (apps) compatible with our system.

## 2023-07-17 NOTE — PROGRESS NOTE ADULT - PROBLEM SELECTOR PLAN 1
Blood cultures currently no growth. CXR shows moderate-large L pleural effusion--although she remains on room air and no obvious respiratory symptoms.   +ESBL UTI  -now on Meropenem  -monitor WBC  -aspiration precautions  -wound care  -discussed with Dr. Tsai ,ID cons
Fevers resolved  Blood cultures currently no growth. CXR shows moderate-large L pleural effusion--although she remains on room air and no obvious respiratory symptoms.   -No fever but persistent leukocytosis  -CT no hematoma  -may need thoracentesis  -now on Zerbaxa until 7/17  -monitor WBC  -aspiration precautions  -wound care
cont lantus 13 units qhs  cont admelog corrective scale coverage qac/qhs  cont cons cho diet  goal bg 100-180 in hosp setting
Blood cultures currently no growth. CXR shows moderate-large L pleural effusion--although she remains on room air and no obvious respiratory symptoms.   -worsening leukocytosis  +ESBL UTI  -now on Meropenem  -monitor WBC  -aspiration precautions  -wound care  -discussed with Dr. Tsai ,ID cons
Fevers resolved  Blood cultures currently no growth. CXR shows moderate-large L pleural effusion--although she remains on room air and no obvious respiratory symptoms.   -No fever but persistent leukocytosis  -CT no hematoma  -may need thoracentesis  -now on Zerbaxa until 7/17  -monitor WBC  -aspiration precautions  -wound care
increase lantus 15 units qhs  cont mod dose admelog corrective scale coverage qac/qhs  cont cons cho diet  goal bg 100-180 in hosp setting
Blood cultures currently no growth. CXR shows moderate-large L pleural effusion--although she remains on room air and no obvious respiratory symptoms. Urinalysis without pyuria.  -continue cefepime 1g IV q24h ,s/p vanco  -follow blood cultures to completion  -monitor WBC--if increasing consider CT chest and A/P  -aspiration precautions  -wound care  -discussed with Dr. Tsai ,ID cons
Fall precautions .,PT/OT ,NWB RLE
cont lantus 13 units qhs  cont low dose admelog corrective scale coverage qac/qhs  cont cons cho diet  goal bg 100-180 in hosp setting
Fall precautions .,PT/OT ,NWB RLE
cont lantus 15 units qhs  cont mod dose admelog corrective scale coverage q6hrs  goal bg 140-180 in icu setting
Blood cultures currently no growth. CXR shows moderate-large L pleural effusion--although she remains on room air and no obvious respiratory symptoms.   -CT no hematoma  -may need thoracentesis  -worsening leukocytosis  -off abx  -monitor WBC  -aspiration precautions  -wound care  -discussed with Dr. Tsai ,ID cons
Blood cultures currently no growth. CXR shows moderate-large L pleural effusion--although she remains on room air and no obvious respiratory symptoms. Urinalysis without pyuria.  -continue cefepime 1g IV q24h ,s/p vanco  -follow blood cultures to completion  -monitor WBC--if increasing consider CT chest and A/P  -aspiration precautions  -wound care  -discussed with Dr. Tsai ,ID cons
Fall precautions .,PT/OT ,NWB RLE
Blood cultures currently no growth. CXR shows moderate-large L pleural effusion--although she remains on room air and no obvious respiratory symptoms.   +ESBL UTI  Titrate abx as per ID  -monitor WBC--check CT chest  -aspiration precautions  -wound care  -discussed with Dr. Tsai ,ID cons
Fevers resolved  Blood cultures currently no growth. CXR shows moderate-large L pleural effusion--although she remains on room air and no obvious respiratory symptoms.   -No fever but persistent leukocytosis  -CT no hematoma  -may need thoracentesis  -now on Zerbaxa until 7/17  -monitor WBC  -aspiration precautions  -wound care
Unclear etiology  ICH unlikely  Neuro f/u
Blood cultures currently no growth. CXR shows moderate-large L pleural effusion--although she remains on room air and no obvious respiratory symptoms. Urinalysis without pyuria.  -continue cefepime 1g IV q24h ,s/p vanco  -follow blood cultures to completion  -monitor WBC--check CT chest  -aspiration precautions  -wound care  -discussed with Dr. Tsai ,ID cons
Unclear etiology  ICH unlikely  Neuro f/u
Unclear etiology  ICH unlikely  Neuro f/u

## 2023-07-17 NOTE — PROGRESS NOTE ADULT - PROBLEM SELECTOR PROBLEM 10
HTN (hypertension)
Prophylactic measure
HTN (hypertension)
HTN (hypertension)
Prophylactic measure
HTN (hypertension)

## 2023-07-17 NOTE — PROGRESS NOTE ADULT - PROBLEM SELECTOR PROBLEM 3
Pubic ramus fracture
Pleural effusion
Pubic ramus fracture
Pleural effusion
ESRD on dialysis
Pubic ramus fracture
Pleural effusion
Closed pelvic fracture
Closed pelvic fracture
ESRD on dialysis
Closed pelvic fracture
ESRD on dialysis
ESRD on dialysis
Pleural effusion

## 2023-07-17 NOTE — PROGRESS NOTE ADULT - PROBLEM SELECTOR PROBLEM 2
Acute febrile illness
Acute febrile illness
Encephalopathy, unspecified
Pleural effusion
Fall
Fall
Closed pelvic fracture
Fall
Encephalopathy, unspecified
Pleural effusion
Pleural effusion
Closed pelvic fracture
Closed pelvic fracture
Encephalopathy, unspecified
Pleural effusion
Acute febrile illness

## 2023-07-17 NOTE — SOCIAL WORK PROGRESS NOTE - NSSWPROGRESSNOTE_GEN_ALL_CORE
moe once again moved back to original  time of 4 pm as pt no longer requires last dose of IV antibiotics. [facility] aware; anticipating pt upon dc at 4.

## 2023-07-17 NOTE — PROGRESS NOTE ADULT - PROBLEM SELECTOR PLAN 7
Pain management prn, PT/OT  NWB RLE  Case d/w ortho team -ok to resume OAC , monitor h/h closely
Pain management ,OT/PT
HD as per nephrologist
thoracocentesis as per pulm cons
HD as per nephrologist
HD as per nephrologist
Pain management ,OT/PT
Pain management prn, PT/OT  NWB RLE  Case d/w ortho team -ok to resume OAC , monitor h/h closely
Pain management prn, PT/OT  NWB RLE  Case d/w ortho team -ok to resume OAC , monitor h/h closely
thoracocentesis as per pulm cons
Gastrointestinal stress ulcer prophylaxis and DVT prophylaxis administered
Pain management prn, PT/OT  NWB RLE  Case d/w ortho team -ok to resume OAC , monitor h/h closely

## 2023-07-17 NOTE — PROGRESS NOTE ADULT - SUBJECTIVE AND OBJECTIVE BOX
Patient is a 72y Female with a known history of :  Hip fracture [S72.009A]    Closed pelvic fracture [S32.9XXA]    Pubic ramus fracture [S32.599A]    Right acetabular fracture [S32.401A]    ESRD on dialysis [N18.6]    Prophylactic measure [Z29.9]    HTN (hypertension) [I10]    Fall [W19.XXXA]    Pleural effusion [J90]    Acute febrile illness [R50.9]    Type 2 diabetes mellitus [E11.9]    Sacral decubitus ulcer, stage IV [L89.154]    ICH (intracerebral hemorrhage) [I61.9]    Encephalopathy, unspecified [G93.40]      HPI:  72-year-old female with significant past medical history for hypertension, diabetes, dementia, end-stage renal disease on hemodialysis presents to the ED status post unwitnessed fall from [facility].  Patient states that she heard some voices then rolled out of bed.  Patient complaining of right hip pain.  Unknown head trauma.  + Eliquis < from: Xray Femur showed  Fractures including the medial wall of the acetabulum and inferior right pubic ramus Admitted for pain management ,PT/OT        (01 Jul 2023 15:24)      REVIEW OF SYSTEMS:    CONSTITUTIONAL: No fever, weight loss, or fatigue  EYES: No eye pain, visual disturbances, or discharge  ENMT:  No difficulty hearing, tinnitus, vertigo; No sinus or throat pain  NECK: No pain or stiffness  BREASTS: No pain, masses, or nipple discharge  RESPIRATORY: No cough, wheezing, chills or hemoptysis; No shortness of breath  CARDIOVASCULAR: No chest pain, palpitations, dizziness, or leg swelling  GASTROINTESTINAL: No abdominal or epigastric pain. No nausea, vomiting, or hematemesis; No diarrhea or constipation. No melena or hematochezia.  GENITOURINARY: No dysuria, frequency, hematuria, or incontinence  NEUROLOGICAL: No headaches, memory loss, loss of strength, numbness, or tremors  SKIN: No itching, burning, rashes, or lesions   LYMPH NODES: No enlarged glands  ENDOCRINE: No heat or cold intolerance; No hair loss  MUSCULOSKELETAL: No joint pain or swelling; No muscle, back, or extremity pain  PSYCHIATRIC: No depression, anxiety, mood swings, or difficulty sleeping  HEME/LYMPH: No easy bruising, or bleeding gums  ALLERGY AND IMMUNOLOGIC: No hives or eczema    MEDICATIONS  (STANDING):  albumin human 25% IVPB 50 milliLiter(s) IV Intermittent <User Schedule>  artificial  tears Solution 1 Drop(s) Both EYES every 12 hours  aspirin  chewable 81 milliGRAM(s) Oral daily  ceftolozane/tazobactam IVPB 150 milliGRAM(s) IV Intermittent every 8 hours  dextrose 5%. 1000 milliLiter(s) (100 mL/Hr) IV Continuous <Continuous>  dextrose 5%. 1000 milliLiter(s) (50 mL/Hr) IV Continuous <Continuous>  dextrose 50% Injectable 12.5 Gram(s) IV Push once  dextrose 50% Injectable 25 Gram(s) IV Push once  dextrose 50% Injectable 25 Gram(s) IV Push once  diltiazem    Tablet 60 milliGRAM(s) Oral three times a day  dronabinol 2.5 milliGRAM(s) Oral two times a day before meals  glucagon  Injectable 1 milliGRAM(s) IntraMuscular once  imipramine 50 milliGRAM(s) Oral daily  insulin glargine Injectable (LANTUS) 13 Unit(s) SubCutaneous at bedtime  insulin lispro (ADMELOG) corrective regimen sliding scale   SubCutaneous three times a day before meals  insulin lispro (ADMELOG) corrective regimen sliding scale   SubCutaneous at bedtime  lidocaine   4% Patch 1 Patch Transdermal daily  metoprolol tartrate 100 milliGRAM(s) Oral two times a day  midodrine. 10 milliGRAM(s) Oral three times a day  mirtazapine 7.5 milliGRAM(s) Oral at bedtime  pantoprazole    Tablet 40 milliGRAM(s) Oral before breakfast  polyethylene glycol 3350 17 Gram(s) Oral daily  senna 2 Tablet(s) Oral at bedtime  sodium zirconium cyclosilicate 5 Gram(s) Oral daily    MEDICATIONS  (PRN):  acetaminophen     Tablet .. 650 milliGRAM(s) Oral every 6 hours PRN Temp greater or equal to 38C (100.4F), Mild Pain (1 - 3)  dextrose Oral Gel 15 Gram(s) Oral once PRN Blood Glucose LESS THAN 70 milliGRAM(s)/deciliter      ALLERGIES: No Known Drug Allergies  latex (Hives)      FAMILY HISTORY:  FH: myocardial infarction (Father)    FH: myocardial infarction (Father)    Family history of type 2 diabetes mellitus in brother (Sibling)        PHYSICAL EXAMINATION:  -----------------------------  T(C): 36.7 (07-17-23 @ 05:47), Max: 36.9 (07-16-23 @ 20:22)  HR: 84 (07-17-23 @ 05:47) (72 - 92)  BP: 178/65 (07-17-23 @ 05:47) (161/78 - 178/65)  RR: 18 (07-17-23 @ 05:47) (12 - 18)  SpO2: 98% (07-17-23 @ 05:47) (91% - 99%)  Wt(kg): --    07-16 @ 07:01  -  07-17 @ 07:00  --------------------------------------------------------  IN:    IV PiggyBack: 25 mL    IV PiggyBack: 300 mL  Total IN: 325 mL    OUT:  Total OUT: 0 mL    Total NET: 325 mL            VITALS  T(C): 36.7 (07-17-23 @ 05:47), Max: 36.9 (07-16-23 @ 20:22)  HR: 84 (07-17-23 @ 05:47) (72 - 92)  BP: 178/65 (07-17-23 @ 05:47) (161/78 - 178/65)  RR: 18 (07-17-23 @ 05:47) (12 - 18)  SpO2: 98% (07-17-23 @ 05:47) (91% - 99%)    Constitutional: well developed, normal appearance, well groomed, well nourished, no deformities and no acute distress.   Eyes: the conjunctiva exhibited no abnormalities and the eyelids demonstrated no xanthelasmas.   HEENT: normal oral mucosa, no oral pallor and no oral cyanosis.   Neck: normal jugular venous A waves present, normal jugular venous V waves present and no jugular venous wilson A waves.   Pulmonary: no respiratory distress, normal respiratory rhythm and effort, no accessory muscle use and lungs were clear to auscultation bilaterally.   Cardiovascular: heart rate and rhythm were normal, normal S1 and S2 and no murmur, gallop, rub, heave or thrill are present.   Abdomen: soft, non-tender, no hepato-splenomegaly and no abdominal mass palpated.   Musculoskeletal: the gait could not be assessed..   Extremities: no clubbing of the fingernails, no localized cyanosis, no petechial hemorrhages and no ischemic changes.   Skin: normal skin color and pigmentation, no rash, no venous stasis, no skin lesions, no skin ulcer and no xanthoma was observed.   Psychiatric: oriented to person, place, and time, the affect was normal, the mood was normal and not feeling anxious.     LABS:   --------  07-17    134<L>  |  98  |  65<H>  ----------------------------<  222<H>  5.4<H>   |  23  |  7.10<H>    Ca    9.6      17 Jul 2023 06:50  Mg     2.5     07-17                           11.0   17.48 )-----------( 449      ( 17 Jul 2023 06:50 )             34.5                 RADIOLOGY:  -----------------    ECG:     ECHO:

## 2023-07-17 NOTE — PROGRESS NOTE ADULT - REASON FOR ADMISSION
s/p fall

## 2023-07-17 NOTE — PROGRESS NOTE ADULT - PROBLEM SELECTOR PROBLEM 6
ESRD on dialysis
Fall
HTN (hypertension)
ESRD on dialysis
ESRD on dialysis
Fall
Closed pelvic fracture
Fall
HTN (hypertension)
Closed pelvic fracture
HTN (hypertension)
Closed pelvic fracture
Fall
Fall
Closed pelvic fracture
Fall

## 2023-07-17 NOTE — SOCIAL WORK PROGRESS NOTE - NSSWPROGRESSNOTE_GEN_ALL_CORE
pt for anticipated dc Tuesday 7/18. JAYE faxed this am to [facility], pt LT resident. sw to follow for transition to SNF.

## 2023-07-17 NOTE — PROGRESS NOTE ADULT - SUBJECTIVE AND OBJECTIVE BOX
Neurology follow up note    SHILO [id number]      Interval History:    Patient feels ok no new complaints.    Allergies    No Known Drug Allergies  latex (Hives)    Intolerances        MEDICATIONS    acetaminophen     Tablet .. 650 milliGRAM(s) Oral every 6 hours PRN  albumin human 25% IVPB 50 milliLiter(s) IV Intermittent <User Schedule>  artificial  tears Solution 1 Drop(s) Both EYES every 12 hours  aspirin  chewable 81 milliGRAM(s) Oral daily  dextrose 5%. 1000 milliLiter(s) IV Continuous <Continuous>  dextrose 5%. 1000 milliLiter(s) IV Continuous <Continuous>  dextrose 50% Injectable 12.5 Gram(s) IV Push once  dextrose 50% Injectable 25 Gram(s) IV Push once  dextrose 50% Injectable 25 Gram(s) IV Push once  dextrose Oral Gel 15 Gram(s) Oral once PRN  diltiazem    Tablet 60 milliGRAM(s) Oral three times a day  dronabinol 2.5 milliGRAM(s) Oral two times a day before meals  glucagon  Injectable 1 milliGRAM(s) IntraMuscular once  imipramine 50 milliGRAM(s) Oral daily  insulin glargine Injectable (LANTUS) 13 Unit(s) SubCutaneous at bedtime  insulin lispro (ADMELOG) corrective regimen sliding scale   SubCutaneous three times a day before meals  insulin lispro (ADMELOG) corrective regimen sliding scale   SubCutaneous at bedtime  lidocaine   4% Patch 1 Patch Transdermal daily  metoprolol tartrate 100 milliGRAM(s) Oral two times a day  midodrine. 5 milliGRAM(s) Oral every 8 hours  mirtazapine 7.5 milliGRAM(s) Oral at bedtime  pantoprazole    Tablet 40 milliGRAM(s) Oral before breakfast  polyethylene glycol 3350 17 Gram(s) Oral daily  senna 2 Tablet(s) Oral at bedtime  sodium zirconium cyclosilicate 5 Gram(s) Oral daily  traMADol 50 milliGRAM(s) Oral two times a day PRN  traMADol 25 milliGRAM(s) Oral three times a day PRN              Vital Signs Last 24 Hrs  T(C): 36.6 (17 Jul 2023 11:49), Max: 36.9 (16 Jul 2023 20:22)  T(F): 97.9 (17 Jul 2023 11:49), Max: 98.5 (16 Jul 2023 20:22)  HR: 74 (17 Jul 2023 13:01) (74 - 87)  BP: 150/84 (17 Jul 2023 13:01) (145/59 - 178/65)  BP(mean): --  RR: 20 (17 Jul 2023 11:49) (17 - 20)  SpO2: 93% (17 Jul 2023 11:49) (93% - 98%)    Parameters below as of 17 Jul 2023 11:49  Patient On (Oxygen Delivery Method): room air    REVIEW OF SYSTEMS:  Could not be obtained secondary to the patient being lethargic, but as per my conversation with spouse, she is wheelchair bound.  For the last few weeks, she has been having limited verbal output.    PHYSICAL EXAMINATION:   HEENT:  Head:  Normocephalic, atraumatic.  Eyes:  No scleral icterus.  Ears:  Hearing hard to evaluate secondary to the patient being lethargic.  NECK:  Supple.  RESPIRATORY:  Air entry bilaterally.  CARDIOVASCULAR:  S1 and S2 heard.  ABDOMEN:  Soft and nontender.  EXTREMITIES:  No clubbing or cyanosis was noted.      NEUROLOGIC:  The patient was arousable to verbal stimuli, opens eyes.  Pupils bilaterally were 4 mm, slight reactivity, on-off blink to visual threat.  To painful stimuli, the patient would say the word "ok"  now putting sentences together.  As per my conversation with spouse, lately she is becoming less and less verbal.  Motor:  With painful stimuli, elevated bilateral upper extremities with a slow drop, would say after 5 seconds, both fell to the ground, bilateral lower extremities were in a contracted position.  The patient does have a right hip flexor, is wheelchair bound, increased tone, slight movement of the feet with stimuli.      LABS:  CBC Full  -  ( 17 Jul 2023 06:50 )  WBC Count : 17.48 K/uL  RBC Count : 3.48 M/uL  Hemoglobin : 11.0 g/dL  Hematocrit : 34.5 %  Platelet Count - Automated : 449 K/uL  Mean Cell Volume : 99.1 fl  Mean Cell Hemoglobin : 31.6 pg  Mean Cell Hemoglobin Concentration : 31.9 gm/dL  Auto Neutrophil # : x  Auto Lymphocyte # : x  Auto Monocyte # : x  Auto Eosinophil # : x  Auto Basophil # : x  Auto Neutrophil % : x  Auto Lymphocyte % : x  Auto Monocyte % : x  Auto Eosinophil % : x  Auto Basophil % : x    Urinalysis Basic - ( 17 Jul 2023 06:50 )    Color: x / Appearance: x / SG: x / pH: x  Gluc: 222 mg/dL / Ketone: x  / Bili: x / Urobili: x   Blood: x / Protein: x / Nitrite: x   Leuk Esterase: x / RBC: x / WBC x   Sq Epi: x / Non Sq Epi: x / Bacteria: x      07-17    134<L>  |  98  |  65<H>  ----------------------------<  222<H>  5.4<H>   |  23  |  7.10<H>    Ca    9.6      17 Jul 2023 06:50  Mg     2.5     07-17      Hemoglobin A1C:       Vitamin B12         RADIOLOGY          ANALYSIS AND PLAN:  This is a 72-year-old with episode of unresponsiveness and altered mental status.  For episode of unresponsiveness and altered mental status, suspect most likely metabolic encephalopathy secondary to underlying infectious type process, possibly urinary tract, also questionable the patient had an event a few weeks ago.  As per previous notes a few months ago, the patient was more verbal and interactive, but as per my conversation with spouse, he states for the last few weeks, she has been speaking less with less interaction, questionable any type of new central nervous system event occurred a few weeks ago.  For history of underlying mild cognitive impairment versus subtle dementia secondary to the patient appears to be advanced, would recommend supportive therapy.  For history of hypertension, monitor systolic blood pressure, avoid hypotension if possible.  Please maintain a systolic blood pressure between 140 and 100.  mri reviewed no significant changes if needed cleared for AC risk vs benefits   overall more awake and interactive today 7/17    Spoke with spouse, Damir, at [phone] 7/12 called today went to voicemail 7/13 902am 7/14 911 am    Greater than 45 minutes of time was spent with the patient, plan of care, reviewing data, with greater than 50% of the visit was spent counseling and/or coordinating care with multidisciplinary healthcare team

## 2023-07-17 NOTE — DISCHARGE NOTE NURSING/CASE MANAGEMENT/SOCIAL WORK - NSDCVIVACCINE_GEN_ALL_CORE_FT
Tdap; 27-Nov-2015 19:03; Bob Kahn (LEV); [employer]; [id number]; IntraMuscular; Deltoid Left.; 0.5 milliLiter(s); VIS (VIS Published: 09-May-2013, VIS Presented: 27-Nov-2015);

## 2023-07-17 NOTE — PROGRESS NOTE ADULT - PROBLEM SELECTOR PROBLEM 4
ESRD on dialysis
Right acetabular fracture
ESRD on dialysis
Type 2 diabetes mellitus
Right acetabular fracture
ESRD on dialysis
Pubic ramus fracture
ESRD on dialysis
Type 2 diabetes mellitus
Pubic ramus fracture
Right acetabular fracture
ESRD on dialysis
Type 2 diabetes mellitus
Pubic ramus fracture
Type 2 diabetes mellitus
ESRD on dialysis

## 2023-08-09 ENCOUNTER — EMERGENCY (EMERGENCY)
Facility: HOSPITAL | Age: 73
LOS: 1 days | Discharge: HOME | End: 2023-08-09
Attending: EMERGENCY MEDICINE | Admitting: EMERGENCY MEDICINE
Payer: MEDICARE

## 2023-08-09 VITALS
HEIGHT: 65 IN | OXYGEN SATURATION: 96 % | TEMPERATURE: 98 F | WEIGHT: 119.93 LBS | RESPIRATION RATE: 20 BRPM | DIASTOLIC BLOOD PRESSURE: 78 MMHG | HEART RATE: 74 BPM | SYSTOLIC BLOOD PRESSURE: 129 MMHG

## 2023-08-09 DIAGNOSIS — Z89.411 ACQUIRED ABSENCE OF RIGHT GREAT TOE: Chronic | ICD-10-CM

## 2023-08-09 LAB
ALBUMIN SERPL ELPH-MCNC: 2.3 G/DL — LOW (ref 3.3–5)
ALP SERPL-CCNC: 131 U/L — HIGH (ref 40–120)
ALT FLD-CCNC: 12 U/L — SIGNIFICANT CHANGE UP (ref 12–78)
ANION GAP SERPL CALC-SCNC: 7 MMOL/L — SIGNIFICANT CHANGE UP (ref 5–17)
APTT BLD: 26.4 SEC — SIGNIFICANT CHANGE UP (ref 24.5–35.6)
AST SERPL-CCNC: 54 U/L — HIGH (ref 15–37)
BASOPHILS # BLD AUTO: 0.07 K/UL — SIGNIFICANT CHANGE UP (ref 0–0.2)
BASOPHILS NFR BLD AUTO: 0.4 % — SIGNIFICANT CHANGE UP (ref 0–2)
BILIRUB SERPL-MCNC: 0.3 MG/DL — SIGNIFICANT CHANGE UP (ref 0.2–1.2)
BUN SERPL-MCNC: 21 MG/DL — SIGNIFICANT CHANGE UP (ref 7–23)
CALCIUM SERPL-MCNC: 9 MG/DL — SIGNIFICANT CHANGE UP (ref 8.5–10.1)
CHLORIDE SERPL-SCNC: 97 MMOL/L — SIGNIFICANT CHANGE UP (ref 96–108)
CO2 SERPL-SCNC: 31 MMOL/L — SIGNIFICANT CHANGE UP (ref 22–31)
CREAT SERPL-MCNC: 3.5 MG/DL — HIGH (ref 0.5–1.3)
EGFR: 13 ML/MIN/1.73M2 — LOW
EOSINOPHIL # BLD AUTO: 0.23 K/UL — SIGNIFICANT CHANGE UP (ref 0–0.5)
EOSINOPHIL NFR BLD AUTO: 1.4 % — SIGNIFICANT CHANGE UP (ref 0–6)
GLUCOSE SERPL-MCNC: 158 MG/DL — HIGH (ref 70–99)
HCT VFR BLD CALC: 23.3 % — LOW (ref 34.5–45)
HGB BLD-MCNC: 7.2 G/DL — LOW (ref 11.5–15.5)
IMM GRANULOCYTES NFR BLD AUTO: 2.6 % — HIGH (ref 0–0.9)
INR BLD: 1.03 RATIO — SIGNIFICANT CHANGE UP (ref 0.85–1.18)
LG PLATELETS BLD QL AUTO: SLIGHT — SIGNIFICANT CHANGE UP
LYMPHOCYTES # BLD AUTO: 1.45 K/UL — SIGNIFICANT CHANGE UP (ref 1–3.3)
LYMPHOCYTES # BLD AUTO: 8.7 % — LOW (ref 13–44)
MACROCYTES BLD QL: SIGNIFICANT CHANGE UP
MANUAL SMEAR VERIFICATION: SIGNIFICANT CHANGE UP
MCHC RBC-ENTMCNC: 30.9 GM/DL — LOW (ref 32–36)
MCHC RBC-ENTMCNC: 32.4 PG — SIGNIFICANT CHANGE UP (ref 27–34)
MCV RBC AUTO: 105 FL — HIGH (ref 80–100)
MONOCYTES # BLD AUTO: 2.09 K/UL — HIGH (ref 0–0.9)
MONOCYTES NFR BLD AUTO: 12.5 % — SIGNIFICANT CHANGE UP (ref 2–14)
NEUTROPHILS # BLD AUTO: 12.45 K/UL — HIGH (ref 1.8–7.4)
NEUTROPHILS NFR BLD AUTO: 74.4 % — SIGNIFICANT CHANGE UP (ref 43–77)
NRBC # BLD: 0 /100 WBCS — SIGNIFICANT CHANGE UP (ref 0–0)
PLAT MORPH BLD: NORMAL — SIGNIFICANT CHANGE UP
PLATELET # BLD AUTO: 370 K/UL — SIGNIFICANT CHANGE UP (ref 150–400)
POLYCHROMASIA BLD QL SMEAR: SIGNIFICANT CHANGE UP
POTASSIUM SERPL-MCNC: 3.9 MMOL/L — SIGNIFICANT CHANGE UP (ref 3.5–5.3)
POTASSIUM SERPL-SCNC: 3.9 MMOL/L — SIGNIFICANT CHANGE UP (ref 3.5–5.3)
PROT SERPL-MCNC: 6.4 G/DL — SIGNIFICANT CHANGE UP (ref 6–8.3)
PROTHROM AB SERPL-ACNC: 12 SEC — SIGNIFICANT CHANGE UP (ref 9.5–13)
RBC # BLD: 2.22 M/UL — LOW (ref 3.8–5.2)
RBC # FLD: 15.5 % — HIGH (ref 10.3–14.5)
RBC BLD AUTO: ABNORMAL
SODIUM SERPL-SCNC: 135 MMOL/L — SIGNIFICANT CHANGE UP (ref 135–145)
WBC # BLD: 16.72 K/UL — HIGH (ref 3.8–10.5)
WBC # FLD AUTO: 16.72 K/UL — HIGH (ref 3.8–10.5)

## 2023-08-09 PROCEDURE — 93010 ELECTROCARDIOGRAM REPORT: CPT

## 2023-08-09 PROCEDURE — 99285 EMERGENCY DEPT VISIT HI MDM: CPT

## 2023-08-09 PROCEDURE — 73502 X-RAY EXAM HIP UNI 2-3 VIEWS: CPT | Mod: 26,RT

## 2023-08-09 PROCEDURE — 71045 X-RAY EXAM CHEST 1 VIEW: CPT | Mod: 26

## 2023-08-09 NOTE — ED ADULT TRIAGE NOTE - HEART RATE (BEATS/MIN)
Patient's father s/w NT today and they suggested that the patient be seen today. No Same Day visits available per auto search. Please call Dad Nate Barros ([phone]) to get patient scheduled for a visit today.   Patient had fainting spell this morning per info provided to me form NT. 72

## 2023-08-09 NOTE — ED PROVIDER NOTE - NSFOLLOWUPINSTRUCTIONS_ED_ALL_ED_FT
1) Follow-up with your Primary Medical Doctor or referred doctor. Call today / next business day for prompt follow-up.  2) Return to Emergency room for any worsening or persistent pain, weakness, fever, or any other concerning symptoms.  3) See attached instruction sheets for additional information, including information regarding signs and symptoms to look out for, reasons to seek immediate care and other important instructions.  4) Follow-up with any specialists as discussed / noted as well.     Anemia is a condition in which there are not enough red blood cells or hemoglobin in the blood. Hemoglobin is a substance in red blood cells that carries oxygen.    When you do not have enough red blood cells or hemoglobin (are anemic), your body cannot get enough oxygen, and your organs may not work properly. As a result, you may feel very tired or have other problems.    What are the causes?  Common causes of anemia include:  Excessive bleeding. Anemia can be caused by excessive bleeding inside or outside the body, including bleeding from the intestines or from heavy menstrual periods in females.  Poor nutrition.  Long-lasting (chronic) kidney, thyroid, and liver disease.  Bone marrow disorders, spleen problems, and blood disorders.  Cancer and treatments for cancer.  Human immunodeficiency virus (HIV) and acquired immunodeficiency syndrome (AIDS).  Infections, medicines, and autoimmune disorders that destroy red blood cells.  What are the signs or symptoms?  Symptoms of this condition include:  Minor weakness.  Dizziness.  Headache, or difficulties concentrating and sleeping.  Heartbeats that feel irregular or faster than normal (palpitations).  Shortness of breath, especially with exercise.  Pale skin, lips, and nails, or cold hands and feet.  Upset stomach (indigestion) and nausea.  Symptoms may occur suddenly or develop slowly. If your anemia is mild, you may not have symptoms.    How is this diagnosed?  This condition is diagnosed based on blood tests, your medical history, and a physical exam. In some cases, a test may be needed in which cells are removed from the soft tissue inside of a bone and looked at under a microscope (bone marrow biopsy). Your health care provider may also check your stool (feces) for blood and may do more testing to look for the cause of your bleeding.    Other tests may include:  Imaging tests, such as a CT scan or MRI.  A procedure to see inside your esophagus and stomach (endoscopy). The esophagus is the part of the body that moves food from your mouth to your stomach.  A procedure to see inside your colon and rectum (colonoscopy).  How is this treated?  Treatment for this condition depends on the cause. If you continue to lose a lot of blood, you may need to be treated at a hospital. Treatment may include:  Taking supplements of iron, vitamin B12, or folic acid.  Taking a hormone medicine (erythropoietin) that can help to stimulate red blood cell growth.  Receiving donated blood through an IV (blood transfusion). This may be needed if you lose a lot of blood.  Making changes to your diet.  Having surgery to remove your spleen.  Follow these instructions at home:  Take over-the-counter and prescription medicines only as told by your health care provider.  Take supplements only as told by your health care provider.  Follow any diet instructions that you were given by your health care provider.  Keep all follow-up visits. Your health care provider will want to recheck your blood tests.  Contact a health care provider if:  You develop new bleeding anywhere in the body.  You are very weak.  Get help right away if:  You are short of breath.  You have pain in your abdomen or chest.  You are dizzy or feel faint.  You have trouble concentrating.  You have bloody stools, black stools, or tarry stools.  You vomit repeatedly or you vomit up blood.  These symptoms may be an emergency. Get help right away. Call 911.  Do not wait to see if the symptoms will go away.  Do not drive yourself to the hospital.  Summary  Anemia is a condition in which you do not have enough red blood cells or enough of a substance in your red blood cells that carries oxygen.  Symptoms may occur suddenly or develop slowly.  If your anemia is mild, you may not have symptoms.  This condition is diagnosed with blood tests, a medical history, and a physical exam. Other tests may be needed.  Treatment for this condition depends on the cause of the anemia.  This information is not intended to replace advice given to you by your health care provider. Make sure you discuss any questions you have with your health care provider.

## 2023-08-09 NOTE — ED ADULT NURSE REASSESSMENT NOTE - NS ED NURSE REASSESS COMMENT FT1
Telephone consent for blood transfusion taken from son Jeronimo Mercado @ tel#[phone] by Dr. Mahmood verified by myself and agreed with the transfusion. Telephone consent for blood transfusion taken from son Jeronimo Mercado @ tel#[phone] by Dr. Mahmood verified by myself. Son agreed with the transfusion.

## 2023-08-09 NOTE — ED PROVIDER NOTE - OBJECTIVE STATEMENT
72-year-old female with a history of type 2 diabetes, osteomyelitis, end-stage renal disease, atrial fibrillation, anemia, hypertension, hyperlipidemia, known recent right acetabular/pubic rami fracture presents with brought in by EMS from [facility] for generalized weakness and complaints of right hip pain.  Patient was sent by EMS for acute anemia with a hemoglobin of 6.3.  No known fever or chills.  No cough/URI.  No known recent fall or trauma.    No other acute history available.  Patient complains of right hip pain which she has had status post recent fall prior to hospitalization.

## 2023-08-09 NOTE — ED ADULT NURSE NOTE - OBJECTIVE STATEMENT
Patient BIBA [facility], for low H&H (6.3), states she fell and hit her right hip. Denies LoC, denies hitting head. Has right fistula (T, TH, Friday for dialysis). Patient is AOx3, safety precautions in place, awaiting evaluation

## 2023-08-09 NOTE — ED PROVIDER NOTE - MUSCULOSKELETAL, MLM
Spine appears normal, range of motion is not limited, no muscle or joint tenderness, FROM R hip with no deformity / laxity. No crepitus

## 2023-08-09 NOTE — ED PROVIDER NOTE - PATIENT PORTAL LINK FT
You can access the FollowMyHealth Patient Portal offered by [facility] by registering at the following website: [email or web address]. By joining [employer]’s FollowMyHealth portal, you will also be able to view your health information using other applications (apps) compatible with our system.

## 2023-08-09 NOTE — ED PROVIDER NOTE - NS_EDPROVIDERDISPOUSERTYPE_ED_A_ED
Hypertriglyceridemia Monitoring: I explained this is common when taking isotretinoin. If this worsens they will contact us. Attending Attestation (For Attendings USE Only)...

## 2023-08-10 VITALS
RESPIRATION RATE: 16 BRPM | HEART RATE: 74 BPM | TEMPERATURE: 97 F | DIASTOLIC BLOOD PRESSURE: 48 MMHG | SYSTOLIC BLOOD PRESSURE: 107 MMHG | OXYGEN SATURATION: 97 %

## 2023-08-10 LAB — BLD GP AB SCN SERPL QL: SIGNIFICANT CHANGE UP

## 2023-08-10 PROCEDURE — 80053 COMPREHEN METABOLIC PANEL: CPT

## 2023-08-10 PROCEDURE — 86901 BLOOD TYPING SEROLOGIC RH(D): CPT

## 2023-08-10 PROCEDURE — 85025 COMPLETE CBC W/AUTO DIFF WBC: CPT

## 2023-08-10 PROCEDURE — P9016: CPT

## 2023-08-10 PROCEDURE — 86850 RBC ANTIBODY SCREEN: CPT

## 2023-08-10 PROCEDURE — 93005 ELECTROCARDIOGRAM TRACING: CPT

## 2023-08-10 PROCEDURE — 36415 COLL VENOUS BLD VENIPUNCTURE: CPT

## 2023-08-10 PROCEDURE — 71045 X-RAY EXAM CHEST 1 VIEW: CPT

## 2023-08-10 PROCEDURE — 73502 X-RAY EXAM HIP UNI 2-3 VIEWS: CPT

## 2023-08-10 PROCEDURE — 99285 EMERGENCY DEPT VISIT HI MDM: CPT | Mod: 25

## 2023-08-10 PROCEDURE — 36430 TRANSFUSION BLD/BLD COMPNT: CPT

## 2023-08-10 PROCEDURE — 86923 COMPATIBILITY TEST ELECTRIC: CPT

## 2023-08-10 PROCEDURE — 86900 BLOOD TYPING SEROLOGIC ABO: CPT

## 2023-08-10 PROCEDURE — 85610 PROTHROMBIN TIME: CPT

## 2023-08-10 PROCEDURE — 85730 THROMBOPLASTIN TIME PARTIAL: CPT

## 2023-08-10 NOTE — ED ADULT NURSE REASSESSMENT NOTE - NS ED NURSE REASSESS COMMENT FT1
Patient is for discharge awaiting for ambulance for transport. [facility] Rn Supervisor Jason made aware of discharge and had 1 unit Packed red cells transfused to patient.

## 2023-08-20 ENCOUNTER — EMERGENCY (EMERGENCY)
Facility: HOSPITAL | Age: 73
LOS: 1 days | Discharge: HOME | End: 2023-08-20
Attending: EMERGENCY MEDICINE | Admitting: EMERGENCY MEDICINE
Payer: MEDICARE

## 2023-08-20 VITALS
RESPIRATION RATE: 16 BRPM | SYSTOLIC BLOOD PRESSURE: 141 MMHG | DIASTOLIC BLOOD PRESSURE: 63 MMHG | HEART RATE: 73 BPM | OXYGEN SATURATION: 94 % | TEMPERATURE: 98 F

## 2023-08-20 VITALS
WEIGHT: 100.09 LBS | DIASTOLIC BLOOD PRESSURE: 56 MMHG | SYSTOLIC BLOOD PRESSURE: 147 MMHG | RESPIRATION RATE: 17 BRPM | TEMPERATURE: 98 F | HEIGHT: 65 IN | OXYGEN SATURATION: 92 % | HEART RATE: 78 BPM

## 2023-08-20 DIAGNOSIS — Z89.411 ACQUIRED ABSENCE OF RIGHT GREAT TOE: Chronic | ICD-10-CM

## 2023-08-20 PROCEDURE — 99284 EMERGENCY DEPT VISIT MOD MDM: CPT

## 2023-08-20 PROCEDURE — 70450 CT HEAD/BRAIN W/O DYE: CPT | Mod: MA

## 2023-08-20 PROCEDURE — 72125 CT NECK SPINE W/O DYE: CPT | Mod: MA

## 2023-08-20 PROCEDURE — 72125 CT NECK SPINE W/O DYE: CPT | Mod: 26,MA

## 2023-08-20 PROCEDURE — 70450 CT HEAD/BRAIN W/O DYE: CPT | Mod: 26,MA

## 2023-08-20 PROCEDURE — 99284 EMERGENCY DEPT VISIT MOD MDM: CPT | Mod: 25

## 2023-08-20 NOTE — ED PROVIDER NOTE - MUSCULOSKELETAL, MLM
Spine appears normal, range of motion is not limited, no muscle or joint tenderness--right arm av fistula with + thrill

## 2023-08-20 NOTE — ED PROVIDER NOTE - OBJECTIVE STATEMENT
This patient is a 72-year-old female who is at [facility] who has a history of renal failure on hemodialysis, coronary disease, diabetes frequent falls who is currently in rehab as per patient.  Patient states she woke from a nap this afternoon and went to get out of bed and walk and she had a slip and fall and hit her head.  Patient denies loss consciousness nausea, vomiting, neck pain, numbness, weakness or any other pain.  Patient was sent to the ER for evaluation

## 2023-08-20 NOTE — ED ADULT NURSE NOTE - PATIENT’S MOTHER’S MAIDEN LAST NAME (INFO USED BY THE IMMUNIZATION REGISTRY):
______________________________________________________________________________   

  

[id number] RAD/RAD Ankle Left 2V  

Exam: RAD Ankle Left 2V  

   

 Indication:ANKLE PAIN AFTER FALL.  

   

 Comparison: No prior imaging for comparison.  

   

 Discussion/Impression:   

   

 Unstable ankle mortise fracture. Fractures involve the distal fibular metaphysis  

 and the medial malleolus.  

   

 Talar dome is displaced laterally approximately 20 mm in relation to the tibial  

 plafond.  

   

 A 20 x 15 mm fragment of the medial malleolus remains near its anatomic  

 position, displaced at least 18 mm lateral relation to the donor site.  

   

 Distal fibular fracture is obliquely orientated with at least 6 mm of separation  

 at the fracture site seen on lateral view.  

   

 AP view demonstrates slight impaction and shortening at the fibular fracture  

 site.  

   

 Lateral view also demonstrates posterior displacement of the talar dome in  

 relation to the tibia. This is difficult to accurately measure estimated at at  

 least 15 mm.  

   

 Electronically signed by Arie Castaneda MD on 1/25/2021 3:48 PM  

   

  

Arie Castaneda MD                 

 01/25/21 [id number]    

  

Thank you for allowing us to participate in the care of your patient. Onofre

## 2023-08-20 NOTE — ED PROVIDER NOTE - NSFOLLOWUPINSTRUCTIONS_ED_ALL_ED_FT
Have the patient return to the ER for recurrent falls numbness or weakness of the arms or legs uncontrolled vomiting or lethargy.  Patient should be ambulated with assistance.  Follow-up with primary doctor within the next 1 week

## 2023-08-20 NOTE — ED PROVIDER NOTE - CLINICAL SUMMARY MEDICAL DECISION MAKING FREE TEXT BOX
This patient is a 72-year-old female who is at [facility] who has a history of renal failure on hemodialysis, coronary disease, diabetes frequent falls who is currently in rehab as per patient.  Patient states she woke from a nap this afternoon and went to get out of bed and walk and she had a slip and fall and hit her head.  Patient denies loss consciousness nausea, vomiting, neck pain, numbness, weakness or any other pain.  Patient was sent to the ER for evaluation.   Patient on physical exam with exam essentially normal with no bony tenderness and full range of motion everywhere abdomen benign.  As patient hit her head and she is on aspirin we will get a CT of the head neck and if negative we will likely send the patient back to [facility] This patient is a 72-year-old female who is at [facility] who has a history of renal failure on hemodialysis, coronary disease, diabetes frequent falls who is currently in rehab as per patient.  Patient states she woke from a nap this afternoon and went to get out of bed and walk and she had a slip and fall and hit her head.  Patient denies loss consciousness nausea, vomiting, neck pain, numbness, weakness or any other pain.  Patient was sent to the ER for evaluation.   Patient on physical exam with exam essentially normal with no bony tenderness and full range of motion everywhere abdomen benign.  As patient hit her head and she is on aspirin we will get a CT of the head neck and if negative we will likely send the patient back to [facility].  ct neg neg, d/c to nursing facility

## 2023-08-20 NOTE — ED ADULT NURSE NOTE - CHPI ED NUR SYMPTOMS NEG
no bleeding/no confusion/no deformity/no loss of consciousness/no numbness/no tingling/no vomiting/no weakness

## 2023-08-20 NOTE — ED PROVIDER NOTE - PATIENT PORTAL LINK FT
You can access the FollowMyHealth Patient Portal offered by [facility] by registering at the following website: [email or web address]. By joining [facility]’s FollowMyHealth portal, you will also be able to view your health information using other applications (apps) compatible with our system.

## 2023-08-20 NOTE — ED PROVIDER NOTE - CONSTITUTIONAL, MLM
thin, chronically ill, awake, alert, oriented to person, place, time/situation and in no apparent distress. normal...

## 2023-08-20 NOTE — ED ADULT TRIAGE NOTE - CHIEF COMPLAINT QUOTE
[place] [facility]. + headstrike, - LOC, + anticoagulants. daily ASA. denies pain, n/v/ visual disturbances.

## 2023-08-20 NOTE — ED ADULT NURSE NOTE - NSFALLRISKINTERV_ED_ALL_ED

## 2023-08-20 NOTE — ED ADULT NURSE NOTE - OBJECTIVE STATEMENT
Patient is 73yo F presents with c/o fall today from SNF. Patient reports she Patient is 71yo F presents with c/o fall today from SNF. Patient reports she was walking to the bathroom, did not trip but fell over landing on her chest and her head. Patient presents with small hematoma to top of left side of forehead. Patient denies LOC, nausea, chest pain or SOB, c/o mild dizziness.

## 2023-09-07 NOTE — H&P ADULT - PROBLEM SELECTOR PLAN 2
[CV Risk Factors and Non-Cardiac Disease] : CV risk factors and non-cardiac disease
pain management , pt /ot ,no WB RLE ,case d/w ortho team -ok to resume OAC , monitor h/h closely

## 2023-10-03 NOTE — DISCHARGE NOTE NURSING/CASE MANAGEMENT/SOCIAL WORK - NSDCPETBCESMAN_GEN_ALL_CORE
If you are a smoker, it is important for your health to stop smoking. Please be aware that second hand smoke is also harmful.
Is This A New Presentation, Or A Follow-Up?: Skin Lesions
Which Family Member (Optional)?: Sisters BCC
4

## 2023-10-09 NOTE — ED ADULT NURSE NOTE - NSFALLRSKASSISTTYPE_ED_ALL_ED
Fluconazole Pregnancy And Lactation Text: This medication is Pregnancy Category C and it isn't know if it is safe during pregnancy. It is also excreted in breast milk. Standing/Walking/Toileting

## 2023-10-09 NOTE — ED ADULT NURSE REASSESSMENT NOTE - SKIN INTEGRITY
Adam Cruz requesting Medication Refill for:  GUANFACINE 1 MG Oral Tab  (copy and paste medication information)  Vitamin d  LOV: 5/15/2023   Last Refill date: 5/10/23  Ph[facility]: [facility]   Next Scheduled appointment: wound(s)

## 2023-10-10 NOTE — ED ADULT NURSE NOTE - CAS TRG GEN SKIN CONDITION
Warm/Dry Bexarotene Pregnancy And Lactation Text: This medication is Pregnancy Category X and should not be given to women who are pregnant or may become pregnant. This medication should not be used if you are breast feeding.

## 2023-10-24 NOTE — CARE COORDINATION ASSESSMENT. - REFERRAL INFORMATION CONCERNS
Call to pt  Heart cath scheduled 12-04-23 with dr Kevin Fernandez  Date, time and instructions reviewed and mailed to pt no concerns

## 2024-01-04 NOTE — CARE COORDINATION ASSESSMENT. - FACILITY LEVEL OF CARE:
Per Dr. Dimitris young to reorder Duoneb and use dx mild persistent asthma and to be billed under medicare part b. Rx sent to [facility] and patient notified. Asking for rx to be sent tomorrow to [facility], agreed. Will sent rx tomorrow.     Rx sent on 1/5/23.   
[facility]

## 2024-03-16 NOTE — CARE COORDINATION ASSESSMENT. - HOME EQUIPMENT YN
Patient moved to chair and back to bed assist of 1 with GB and walker. Complained of tolerable left leg pain relieved with positioning. VSS and afebrile. On room air. GCS-15, PERRLA. A and O x4   No

## 2024-06-05 NOTE — CONSULT NOTE ADULT - CONSULT REQUESTED BY NAME
Medicine
Dr Larson
Dr. Finney
dr castro
Podiatry
Dr. Larson
VTE Assessment already completed for this visit

## 2024-06-18 NOTE — PROGRESS NOTE ADULT - SUBJECTIVE AND OBJECTIVE BOX
The phone number listed is missing a number and the one listed there was no answer.        ----- Message from Diamone Speed sent at 6/18/2024  3:59 PM CDT -----  Regarding: pharm  Type: Patient Call Back       Who called: pharm     What is the request in detail: needs a call back on the direction of medication prednisoLONE acetate (PRED FORTE) 1 % DrpS       Can the clinic reply by BERTHANER? Yes       Would the patient rather a call back or a response via [facility]? Call back       Best call back number: [phone]   71 year old Female seen at bedside for right hallux gangrene. Pt doing well. Resting comfortably. Pt scheduled for Right Hallux amputation with Partial ray resection Thursday 6/23/2022 at 2pm with Dr. Faisal Pastor.  Denies any fever, chills, nausea, vomiting, chest pain, shortness of breath, or calf pain at this time.    Allergies    latex (Unknown)  No Known Drug Allergies    Intolerances    MEDICATIONS  (STANDING):  amLODIPine   Tablet 5 milliGRAM(s) Oral daily  aspirin enteric coated 81 milliGRAM(s) Oral daily  atorvastatin 40 milliGRAM(s) Oral at bedtime  calcium acetate 667 milliGRAM(s) Oral three times a day with meals  cefTRIAXone   IVPB 1000 milliGRAM(s) IV Intermittent every 24 hours  cloNIDine 0.1 milliGRAM(s) Oral two times a day  dextrose 5%. 1000 milliLiter(s) (100 mL/Hr) IV Continuous <Continuous>  dextrose 5%. 1000 milliLiter(s) (50 mL/Hr) IV Continuous <Continuous>  dextrose 50% Injectable 25 Gram(s) IV Push once  dextrose 50% Injectable 12.5 Gram(s) IV Push once  dextrose 50% Injectable 25 Gram(s) IV Push once  diltiazem    Tablet 60 milliGRAM(s) Oral every 8 hours  glucagon  Injectable 1 milliGRAM(s) IntraMuscular once  heparin   Injectable 5000 Unit(s) SubCutaneous every 12 hours  imipramine 50 milliGRAM(s) Oral daily  insulin glargine Injectable (LANTUS) 8 Unit(s) SubCutaneous at bedtime  insulin lispro (ADMELOG) corrective regimen sliding scale   SubCutaneous three times a day before meals  insulin lispro (ADMELOG) corrective regimen sliding scale   SubCutaneous at bedtime  metoprolol tartrate 100 milliGRAM(s) Oral every 12 hours  pantoprazole    Tablet 40 milliGRAM(s) Oral before breakfast  povidone iodine 10% Solution 1 Application(s) Topical daily    MEDICATIONS  (PRN):  acetaminophen     Tablet .. 650 milliGRAM(s) Oral every 6 hours PRN Temp greater or equal to 38C (100.4F), Mild Pain (1 - 3)  aluminum hydroxide/magnesium hydroxide/simethicone Suspension 30 milliLiter(s) Oral every 4 hours PRN Dyspepsia  dextrose Oral Gel 15 Gram(s) Oral once PRN Blood Glucose LESS THAN 70 milliGRAM(s)/deciliter  diphenhydrAMINE Injectable 25 milliGRAM(s) IV Push <User Schedule> PRN Combative behavior  melatonin 3 milliGRAM(s) Oral at bedtime PRN Insomnia  ondansetron Injectable 4 milliGRAM(s) IV Push every 8 hours PRN Nausea and/or Vomiting    ICU Vital Signs Last 24 Hrs  T(C): 36.3 (20 Jun 2022 12:56), Max: 37.1 (19 Jun 2022 20:49)  T(F): 97.4 (20 Jun 2022 12:56), Max: 98.8 (19 Jun 2022 20:49)  HR: 59 (20 Jun 2022 12:56) (53 - 74)  BP: 126/69 (20 Jun 2022 12:56) (101/60 - 150/72)  BP(mean): --  ABP: --  ABP(mean): --  RR: 20 (20 Jun 2022 12:56) (18 - 20)  SpO2: 97% (20 Jun 2022 12:56) (91% - 100%)    CBC Full  -  ( 20 Jun 2022 07:04 )  WBC Count : 9.64 K/uL  RBC Count : 4.01 M/uL  Hemoglobin : 11.4 g/dL  Hematocrit : 37.1 %  Platelet Count - Automated : 274 K/uL  Mean Cell Volume : 92.5 fl  Mean Cell Hemoglobin : 28.4 pg  Mean Cell Hemoglobin Concentration : 30.7 gm/dL    PHYSICAL EXAM:  Vascular: DP & PT NON-palpable bilaterally, Capillary refill delayed by 5 secs, Digital hair absent bilaterally  Neurological: Light touch sensation diminished bilaterally  Musculoskeletal: 5/5 strength in all quadrants bilaterally, AJ & STJ ROM intact  Dermatological: 0.5 cm x 0.5 cm medial malleolus ulceration noted to the left ankle, granular wound bed, probe to bone, no periwound erythema, no fluctuance, no malodor, no proximal streaking at this time. Right hallux gangrene noted with signs of demarcation to the PIPJ. Mild periwound erythema, no fluctuance, no malodor, no proximal streaking at this time.      Culture - Urine (collected 16 Jun 2022 03:40)  Source: Clean Catch Clean Catch (Midstream)  Final Report (17 Jun 2022 07:32):    <10,000 CFU/mL Normal Urogenital Shantel    Culture - Blood (collected 16 Jun 2022 03:38)  Source: .Blood Blood-Peripheral  Preliminary Report (17 Jun 2022 04:01):    No growth to date.    Culture - Blood (collected 16 Jun 2022 03:38)  Source: .Blood Blood-Peripheral  Preliminary Report (17 Jun 2022 04:00):    No growth to date.        Imaging: ----------

## 2024-06-19 NOTE — H&P ADULT - PROBLEM/PLAN-8
Name: Tere Carrion  MRN: [id number]  Admit Date: 6/18/2024  Encounter Date: 6/19/2024  PCP: Modesta Gallego DO    Reason for consult: TTP  Attending provider: No att. providers found  Consult attending provider: Dr. Walker    Hematology Consult Note      History of Present Illness   Tere Carrion is a 53 y.o. female with a notable history of TTP in 1999 (treated with prolonged plasmapheresis, steroids, and immunosuppressants including vincristine and azathioprine, splenectomy March 1999), sickle cell trait, alpha thalassemia trait, presented on 6/18 for 2 weeks of worsening generalized weakness, fatigue, migraines, hematuria, and new bruising with petechiae. She presented to her PCP and had a POCT Hgb of 6 so was sent to the [facility] ED. In the ED, Hb was 5.8 (10.5 11 months prior), Plt 21 (336 11 months prior) LDH was 690, and high-sensitivity troponin was 256.    Patient had a headache at presentation to [facility]. She was given 4 mg of morphine which helped resolve the headache. CT head without contrast showed no evidence of acute cortical infarct or intracranial hemorrhage. Trialysis line was placed in the left femoral vein. She was type and screened and 2 units of RBCs were prepared, but transportation arrived before the RBCs were ready.    Patient complains of noticing some petechiae about a week ago, some of which have resolved. She developed some ecchymoses on the lower extremities. She also says she noticed some drops of blood with urination and some blood when wiping after urinating over the past week. She says she is also spotting, as she mentions her period has started 2 days ago as well (first time since January 2024) as she is starting to undergo menopause (but the drops of blood when wiping started before this). She noticed herself becoming more pale over the past few weeks when she looked at very recent photos of herself. She also noticed being short of breath after exertion with  "her heart pounding as well over the past week or so.      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    She says in the early in the year of 1998, she began having vague symptoms of headaches, lightheadedness, several transient ischemic attacks, petechiae and was found to have low platelet count. She was following with a hematology group at [facility] in [place] who were unable to figure out what was going on. She was in ultrasound tech school at the time and actually was reading about the signs and symptoms of thrombotic microangiopathies and brought it up to her hematologist, who did not work this up further. She did say she was given steroids in 1998.    The patient was frustrated and asked for a second opinion in late 1998/early 1999 at [facility] hematology and they immediately figured out there was a TMA. She was admitted in January 1999 to [facility] with a platelet count of 6K. She said she was treated with PLEX and was discharged but the platelet count kept dropping so she was finally admitted for several weeks in February 1999 and she received several agents including IVIG, immuran and vincristine. She received her splenectomy in ~March 1999. Overall, she remembers she had \"87 episodes of PLEX\" before it was finally stopped. She ultimately stopped following with hematology after 2016 believing she was in remission and never had a recurrence.    Patient followed with Dr. Tillman and Dr. Goodman at [facility], most recently in 2016    Per Dr. Goodman's last note at [facility] in 2016:    Per Dr. Tillman's last note in 2013:  \"has been my patient off and on since 1999. 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 Splenectomy was being considered. My " "diagnosis was TTP with florid microangiopathy. She had low [id number] levels and +antibody. She was treated with prolonged plasmapheresis and steroids as well as alternative immunosuppressants .. (Treatment records and details are no longer available - I believe she received both immuran and vincristine This was before the availability of rituxan). Her course was complicated by multiple relapses and at least one episode of line sepsis. Ultimately she underwent splenectomy and was successfully tapered off all other therapy. She has had no recurrence since 1999. In 2004 she became pregnant by in vitro fertilization and gave birth to triplet boys. Her lowest peripartum plt count was 87.    TTP is not her only hematologic diagnosis. 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 She was last seen by me on 2009. On that day she had recurrent iron deficiency but a normal plt count. She has no chronic medical problems and takes no regular medications. She has no PCP. 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 Denies having had any GI/ bleeding, nosebleeds, fevers or chills. Has had some sweats at night. Has lost weight but is trying to lose weight. She denies having had palpable NAVEED, cough, skin lesions, bruises, chest/abdomen/back pains, vision or hearing complaints, nausea/vomiting, neuropathic complaints or seizures. Has had an occasional migraine headache, around 2-3/month, associated w/a visual aura. Has recently been placed on zoloft for anxiety issues; denies feeling depressed.     Lost to follow up with hematology after 2016      Past Medical History     Past Medical History:   Diagnosis " Date    Anemia     Anxiety     Clotting disorder (Multi)     Headache     Sickle cell anemia (Multi) Sickle cell trait    Urinary tract infection          Past Surgical History     Past Surgical History:   Procedure Laterality Date    BREAST SURGERY       SECTION, LOW TRANSVERSE  2004    TUBAL LIGATION  2004         Family History      Family History   Problem Relation Name Age of Onset    Asthma Mother Bruna     Diabetes Mother Bruna     Hypertension Mother Bruna     Cancer Maternal Grandfather Gavin     Colon cancer Maternal Grandfather Gavin     Hypertension Maternal Grandmother Ana     Cancer Mother's Sister Stacey     Cancer Mother's Brother Javier     Cancer Mother's Sister Pat     Cancer Mother's Brother Naeem     Diabetes Brother Teneuss     Hypertension Brother Teneuss     Hypertension Brother Martin     Learning disabilities Son Sanchez          Social History     Works as an ultrasound tech at [employer]    Social History     Socioeconomic History    Marital status:      Spouse name: Not on file    Number of children: Not on file    Years of education: Not on file    Highest education level: Not on file   Occupational History    Not on file   Tobacco Use    Smoking status: Never    Smokeless tobacco: Never   Vaping Use    Vaping status: Never Used   Substance and Sexual Activity    Alcohol use: Never    Drug use: Never    Sexual activity: Yes     Partners: Male     Birth control/protection: Female Sterilization   Other Topics Concern    Not on file   Social History Narrative    Not on file     Social Determinants of Health     Financial Resource Strain: Patient Declined (2022)    Received from [facility]    Overall Financial Resource Strain (CARDIA)     Difficulty of Paying Living Expenses: Patient declined   Food Insecurity: Patient Declined (2022)    Received from [facility]    Hunger Vital Sign     Worried About Running Out of Food in the Last Year: Patient  declined     Ran Out of Food in the Last Year: Patient declined   Transportation Needs: Patient Declined (1/13/2022)    Received from [facility]    PRAPARE - Transportation     Lack of Transportation (Medical): Patient declined     Lack of Transportation (Non-Medical): Patient declined   Physical Activity: Patient Declined (1/13/2022)    Received from [facility]    Exercise Vital Sign     Days of Exercise per Week: Patient declined     Minutes of Exercise per Session: Patient declined   Stress: Patient Declined (1/13/2022)    Received from [facility]    [employer] - Occupational Stress Questionnaire     Feeling of Stress : Patient declined   Social Connections: Patient Declined (1/13/2022)    Received from [facility]    Social Connection and Isolation Panel [NHANES]     Frequency of Communication with Friends and Family: Patient declined     Frequency of Social Gatherings with Friends and Family: Patient declined     Attends Evangelical Services: Patient declined     Active Member of Clubs or Organizations: Patient declined     Attends Club or Organization Meetings: Patient declined     Marital Status: Patient declined   Intimate Partner Violence: Patient Declined (1/13/2022)    Received from [facility]    Humiliation, Afraid, Rape, and Kick questionnaire     Fear of Current or Ex-Partner: Patient declined     Emotionally Abused: Patient declined     Physically Abused: Patient declined     Sexually Abused: Patient declined   Housing Stability: Unknown (1/13/2022)    Received from [facility]    Housing Stability Vital Sign     Unable to Pay for Housing in the Last Year: Patient refused     Number of Places Lived in the Last Year: Not on file     Unstable Housing in the Last Year: Patient refused         Allergies     Allergies   Allergen Reactions    Shellfish Derived Anaphylaxis    Latex Hives    Vancomycin Hives       Medications           Review of Systems   12-point ROS negative,  "except as specified in the HPI.    Physical Exam   /71 (Patient Position: Sitting)   Pulse 97   Temp 37.1 °C (98.8 °F) (Oral)   Resp 18   Ht 1.626 m (5' 4\")   Wt 79.4 kg (175 lb)   LMP 12/31/2023   SpO2 96%   BMI 30.04 kg/m²   Weight:   Vitals:    06/18/24 2357   Weight: 79.4 kg (175 lb)       BSA: 1.89 meters squared    General: very pleasant and polite, pale-appearing, awake, alert, no acute distress  HEENT: normocephalic, atraumatic  Neck: no palpable lymphadenopathy  CV: tachycardic rate, regular rhythm, no MRG  Resp: CTAB, no labored breathing  Abdominal: soft, non-tender, non-distended, active bowel sounds  Extremities: full ROM, no lower extremity swelling  Neuro: no focal neuro deficits  Skin: no rashes, petechiae noted on the left forearm, ecchymoses noted on the right thigh and right lower leg  Psych: normal affect and mood, appropriate judgment    Labs   Reviewed      Imaging   Reviewed    6/19/2024 smear reviewed              WBC: no circulating blasts seen, mature lymphocytes, mature neutrophils  RBC: several schistocytes, no dacrocytes, no spherocytes, RBCs with increased pallor, microcytosis, several target cells, some reticulocytes, occasional nucleated RBC  Plt: no platelet clumping, no enlarged platelets, true thrombocytopenia      Assessment/Plan     Tere Carrion is a 53 y.o. female with a notable history of TTP in 1998/1999 (treated with prolonged plasmapheresis, steroids, and immunosuppressants including vincristine and azathioprine, and splenectomy March 1999), reported sickle cell trait and alpha thalassemia trait presented on 6/18/24 for 2 weeks of worsening generalized weakness, fatigue, migraines, hematuria, and new bruising with petechiae, with significant concern for TTP.    Although we have high suspicion for TTP given the patient's history, she has been having hematuria and is at high risk for bleeding with caplacizumab. Therefore, we will not prescribe caplacizumab " just yet.    Transfusion medicine has been consulted by hematology for urgent plasma exchange. PLEX will begin after patient's hematocrit has incremented appropriately from transfusion of RBCs.    Patient's anemia is multi-factorial at this time - could be a component of the TMA, bleeding, iron deficiency, sickle cell, alpha thalassemia. Because sickle cell traits and alpha thalassemia traits are reported and have no records, repeat testing is appropriate.    Recommendations:  Please follow up [id number] send out lab.  Please start prednisone 80 mg (1 mg/kg) daily (and steroid precautions with PPI, glucose control).  Please trend daily CBC, coags (PT/PTT/INR/D-dimer/fibrinogen), LDH, and reticulocyte count daily.  Please do NOT transfuse platelets - this increases risk of stroke and myocardial infarction. Please discuss with hematology beforehand if considering (such as active bleeding).  Please follow up HIV, hepatitis B/C, and T-spot (required for potential rituximab).  Please follow up stool PCR (Shiga toxin) for hemolytic uremic syndrome.  For anemia workup, please follow up B12, folate, ferritin, iron profile, plasma cell dyscrasia workup, hemoglobin electrophoresis.  Patient may benefit from a urology workup if hematuria is not resolving.    Thank you for this consult, and hematology will continue to follow.   Patient was discussed with Dr. Walker by phone.    Rudy Mejia MD  Hematology/Oncology Fellow  6/19/2024       DISPLAY PLAN FREE TEXT

## 2024-06-21 NOTE — PATIENT PROFILE ADULT - NS PRO AD ANY ON CHART
Called patient to follow up from [facility] appointment on 6/18/24. Patient did not answer, left message with opportunity to call back with any questions or concerns at [phone].    Jessi Gates, CNP     No

## 2024-06-28 NOTE — BH CONSULTATION LIAISON ASSESSMENT NOTE - ADDITIONAL DETAILS / COMMENTS
"Patient: Evan Drew    YOB: 1989    Date: 07/01/2024    Primary Care Provider: Provider, No Known    Chief Complaint   Patient presents with    Post-op Follow-up     Lap ermelinda         History of present illness:  I saw the patient in the office today as a followup from their recent laparoscopic cholecystectomy.  Pathology was reviewed and indicated chronic cholecystitis and cholesterolosis.   They state that they have done well post-operatively and are having no complaints.    Vital Signs:   Vitals:    07/01/24 1537   BP: 106/62   Pulse: 92   Temp: 97.8 °F (36.6 °C)   SpO2: 98%   Weight: 68 kg (150 lb)   Height: 165.1 cm (65\")       Physical Exam:    Abdomen-soft, nondistended.  Wounds look good, no redness or drainage.     Assessment / Plan :    1. Postoperative visit      Patient doing well postop, resume regular activity and follow-up as needed.    Electronically signed by Aissatou Baird MD  07/01/24                " Patient is 1) cognitively grossly intact 2) Does not understand the nature of the medical condition, risks, benefits, alternatives and side-effects of treatment 3) Wants to make decisions voluntarily.  At this time patient has no decision making capacity regarding medical decisions.

## 2024-07-02 NOTE — ED ADULT NURSE NOTE - NEURO SENSATION
Follow-up with your primary care physician in one week if symptoms have not improved or symptoms are starting to get worse.  Over-the-counter Flonase and Zyrtec highly recommended  Increase fluids, keep well-hydrated.  Take Tylenol and Motrin for fever and pain.  Eat and drink anything that is soothing to the back of the throat.  Gargle with warm salt water, throat lozenges will be helpful.      
sensory intact

## 2024-08-06 NOTE — CONSULT NOTE ADULT - REASON FOR ADMISSION
Goal Outcome Evaluation:  Plan of Care Reviewed With: patient        Progress: improving  Outcome Evaluation: Saline locked. PO abx started. Up with assist and walker. Norco and Robaxin for pain. Hopeful to DC tomorrow.                               
shortness of breath

## 2024-10-02 NOTE — PROGRESS NOTE ADULT - SUBJECTIVE AND OBJECTIVE BOX
Patient called requesting refills on her atorvastatin (LIPITOR) 10 MG tablet, hydroCHLOROthiazide (HYDRODIURIL) 25 MG tablet and lisinopril (PRINIVIL;ZESTRIL) 20 MG tablet. Please Advise.       [facility] [address]    Patient is a 71y Female with a known history of :  SIRS (systemic inflammatory response syndrome) [R65.10]    Hypoglycemia [E16.2]    Pleural effusion [J90]    CHF, acute [I50.9]    Chronic CHF [I50.9]    Elevated troponin [R77.8]    Atrial fibrillation [I48.91]    Benign essential HTN [I10]    HLD (hyperlipidemia) [E78.5]    Depression, major [F32.9]    Need for prophylactic measure [Z29.9]    ESRD on hemodialysis [N18.6]    T2DM (type 2 diabetes mellitus) [E11.9]    HFrEF (heart failure with reduced ejection fraction) [I50.20]    Gangrene of toe of right foot [I96]    Atherosclerotic PVD with ulceration [I70.209]      HPI:  Patient is a 71 year old female with PMH of A.fib rate controlled not on AC for hx of multiple falls, T2DM, HTN, HLD, ESRD on HD, CHF, s/p CABG brought in by EMS for generalized weakness at home. Patient states that she was doing well when in the afternoon she tried to get up from her couch and felt weak in the LE and fell back in her couch. Denies any hx of head trauma or loss of consciousness. Denies any weakness or numbness. Denies any chest pain, SOB or palpitations. No fever, cough or chills. No hx of recent travel or sick contacts. At the time of EMS arrival patient was found to have POCBS of 50. EMS gave dextrose and on arrival to the ED patient blood sugar was found to be in 30's with hypothermia of 94.9.    ED course:   Vitals:   BP: 176/85  HR:76  Temp:94.2  RR:18, O2:96% on RA   Significant Labs:   CBC: WBC:16.82 Hb:13.2 , Platlets: 260, Neutro:89   CMP: Lytes: WNL, BUN/Creatinine:48/4.8, Glucose:134 , Alkaline Phos:128, AST, 41, eGFR: 9, HsTrop: 275.9, ProBNP: 362213, Lactate: 2  UA; negative  CXR: Heart and lung appear normal (self read)  CT BRAIN: No acute intracranial bleeding. Central volume loss, chronic microvascular ischemic changes, and chronic bilateral lacunar infarctions.  CT CERVICAL SPINE: No fracture. Grade 1 C4-C5 anterolisthesis. C6-C7 disc degeneration. Bilateral pleural effusions and interlobular septal thickening due to   interstitial edema.  EKG: NSR, left axis deviation, HR 71,   QT/QTc: 426/462  Patient received: Ceftriaxone 1 g x1, Dextrose 5% + NS 1L x1, Dextrose 50% IV X1, heating blanket, 2L NC   (16 Jun 2022 01:19)      REVIEW OF SYSTEMS:    CONSTITUTIONAL: No fever, weight loss, or fatigue  EYES: No eye pain, visual disturbances, or discharge  ENMT:  No difficulty hearing, tinnitus, vertigo; No sinus or throat pain  NECK: No pain or stiffness  BREASTS: No pain, masses, or nipple discharge  RESPIRATORY: No cough, wheezing, chills or hemoptysis; No shortness of breath  CARDIOVASCULAR: No chest pain, palpitations, dizziness, or leg swelling  GASTROINTESTINAL: No abdominal or epigastric pain. No nausea, vomiting, or hematemesis; No diarrhea or constipation. No melena or hematochezia.  GENITOURINARY: No dysuria, frequency, hematuria, or incontinence  NEUROLOGICAL: No headaches, memory loss, loss of strength, numbness, or tremors  SKIN: No itching, burning, rashes, or lesions   LYMPH NODES: No enlarged glands  ENDOCRINE: No heat or cold intolerance; No hair loss  MUSCULOSKELETAL: No joint pain or swelling; No muscle, back, or extremity pain  PSYCHIATRIC: No depression, anxiety, mood swings, or difficulty sleeping  HEME/LYMPH: No easy bruising, or bleeding gums  ALLERGY AND IMMUNOLOGIC: No hives or eczema    MEDICATIONS  (STANDING):  aspirin enteric coated 81 milliGRAM(s) Oral daily  atorvastatin 40 milliGRAM(s) Oral at bedtime  calcium acetate 667 milliGRAM(s) Oral three times a day with meals  chlorhexidine 4% Liquid 1 Application(s) Topical <User Schedule>  cloNIDine 0.1 milliGRAM(s) Oral two times a day  clopidogrel Tablet 75 milliGRAM(s) Oral daily  dextrose 5%. 1000 milliLiter(s) (50 mL/Hr) IV Continuous <Continuous>  dextrose 50% Injectable 25 Gram(s) IV Push once  diltiazem    Tablet 60 milliGRAM(s) Oral every 8 hours  glucagon  Injectable 1 milliGRAM(s) IntraMuscular once  heparin   Injectable 5000 Unit(s) SubCutaneous every 12 hours  imipramine 50 milliGRAM(s) Oral daily  insulin glargine Injectable (LANTUS) 8 Unit(s) SubCutaneous at bedtime  insulin lispro (ADMELOG) corrective regimen sliding scale   SubCutaneous three times a day before meals  metoprolol tartrate 100 milliGRAM(s) Oral every 12 hours  pantoprazole    Tablet 40 milliGRAM(s) Oral before breakfast    MEDICATIONS  (PRN):  acetaminophen     Tablet .. 650 milliGRAM(s) Oral every 6 hours PRN Temp greater or equal to 38C (100.4F), Mild Pain (1 - 3)  aluminum hydroxide/magnesium hydroxide/simethicone Suspension 30 milliLiter(s) Oral every 4 hours PRN Dyspepsia  dextrose Oral Gel 15 Gram(s) Oral once PRN Blood Glucose LESS THAN 70 milliGRAM(s)/deciliter  diphenhydrAMINE Injectable 25 milliGRAM(s) IV Push <User Schedule> PRN Combative behavior  melatonin 3 milliGRAM(s) Oral at bedtime PRN Insomnia      ALLERGIES: latex (Unknown)  No Known Drug Allergies      FAMILY HISTORY:  No pertinent family history in first degree relatives        PHYSICAL EXAMINATION:  -----------------------------  T(C): 36.7 (06-24-22 @ 04:51), Max: 36.7 (06-24-22 @ 04:51)  HR: 72 (06-24-22 @ 04:51) (56 - 72)  BP: 125/51 (06-24-22 @ 04:51) (99/58 - 132/67)  RR: 18 (06-24-22 @ 04:51) (13 - 18)  SpO2: 93% (06-24-22 @ 04:51) (91% - 100%)  Wt(kg): --    06-23 @ 07:01  -  06-24 @ 07:00  --------------------------------------------------------  IN:    Lactated Ringers: 75 mL    Oral Fluid: 120 mL  Total IN: 195 mL    OUT:  Total OUT: 0 mL    Total NET: 195 mL        Height (cm): 175.3 (06-23 @ 13:57)  Weight (kg): 54 (06-23 @ 13:57)  BMI (kg/m2): 17.6 (06-23 @ 13:57)  BSA (m2): 1.66 (06-23 @ 13:57)    VITALS  T(C): 36.7 (06-24-22 @ 04:51), Max: 36.7 (06-24-22 @ 04:51)  HR: 72 (06-24-22 @ 04:51) (56 - 72)  BP: 125/51 (06-24-22 @ 04:51) (99/58 - 132/67)  RR: 18 (06-24-22 @ 04:51) (13 - 18)  SpO2: 93% (06-24-22 @ 04:51) (91% - 100%)    Constitutional: well developed, normal appearance, well groomed, well nourished, no deformities and no acute distress.   Eyes: the conjunctiva exhibited no abnormalities and the eyelids demonstrated no xanthelasmas.   HEENT: normal oral mucosa, no oral pallor and no oral cyanosis.   Neck: normal jugular venous A waves present, normal jugular venous V waves present and no jugular venous wilson A waves.   Pulmonary: no respiratory distress, normal respiratory rhythm and effort, no accessory muscle use and lungs were clear to auscultation bilaterally.   Cardiovascular: heart rate and rhythm were normal, normal S1 and S2 and no murmur, gallop, rub, heave or thrill are present.   Abdomen: soft, non-tender, no hepato-splenomegaly and no abdominal mass palpated.   Musculoskeletal: the gait could not be assessed..   Extremities: no clubbing of the fingernails, no localized cyanosis, no petechial hemorrhages and no ischemic changes.   Skin: normal skin color and pigmentation, no rash, no venous stasis, no skin lesions, no skin ulcer and no xanthoma was observed.   Psychiatric: oriented to person, place, and time, the affect was normal, the mood was normal and not feeling anxious.     LABS:   --------  06-23    131<L>  |  93<L>  |  42<H>  ----------------------------<  336<H>  4.5   |  30  |  4.05<H>    Ca    8.9      23 Jun 2022 07:20  Phos  3.3     06-23  Mg     2.3     06-23    TPro  5.9<L>  /  Alb  2.5<L>  /  TBili  0.3  /  DBili  x   /  AST  17  /  ALT  20  /  AlkPhos  94  06-23                         9.9    15.45 )-----------( 321      ( 23 Jun 2022 07:20 )             32.0                 RADIOLOGY:  -----------------    ECG:     ECHO:

## 2024-10-07 NOTE — PROGRESS NOTE ADULT - PROBLEM SELECTOR PLAN 6
Patient was dc'd from the hospital on 9/28 & needs a follow up appt. 1st available is in December. Please review and advise of day/time that you would be able to see patient. Thank you.    Daughter, Nicholas [phone]   Fall precautions   PT/OT  NWB RLE

## 2024-10-08 NOTE — PROGRESS NOTE ADULT - SUBJECTIVE AND OBJECTIVE BOX
Patient is a 72y Female with a known history of :  Hip fracture [S72.009A]    Closed pelvic fracture [S32.9XXA]    Pubic ramus fracture [S32.599A]    Right acetabular fracture [S32.401A]    ESRD on dialysis [N18.6]    Prophylactic measure [Z29.9]    HTN (hypertension) [I10]    Fall [W19.XXXA]    Pleural effusion [J90]    Acute febrile illness [R50.9]    Type 2 diabetes mellitus [E11.9]    Sacral decubitus ulcer, stage IV [L89.154]    ICH (intracerebral hemorrhage) [I61.9]    Encephalopathy, unspecified [G93.40]      HPI:  72-year-old female with significant past medical history for hypertension, diabetes, dementia, end-stage renal disease on hemodialysis presents to the ED status post unwitnessed fall from [facility].  Patient states that she heard some voices then rolled out of bed.  Patient complaining of right hip pain.  Unknown head trauma.  + Eliquis < from: Xray Femur showed  Fractures including the medial wall of the acetabulum and inferior right pubic ramus Admitted for pain management ,PT/OT        (01 Jul 2023 15:24)      REVIEW OF SYSTEMS:    CONSTITUTIONAL: No fever, weight loss, or fatigue  EYES: No eye pain, visual disturbances, or discharge  ENMT:  No difficulty hearing, tinnitus, vertigo; No sinus or throat pain  NECK: No pain or stiffness  BREASTS: No pain, masses, or nipple discharge  RESPIRATORY: No cough, wheezing, chills or hemoptysis; No shortness of breath  CARDIOVASCULAR: No chest pain, palpitations, dizziness, or leg swelling  GASTROINTESTINAL: No abdominal or epigastric pain. No nausea, vomiting, or hematemesis; No diarrhea or constipation. No melena or hematochezia.  GENITOURINARY: No dysuria, frequency, hematuria, or incontinence  NEUROLOGICAL: No headaches, memory loss, loss of strength, numbness, or tremors  SKIN: No itching, burning, rashes, or lesions   LYMPH NODES: No enlarged glands  ENDOCRINE: No heat or cold intolerance; No hair loss  MUSCULOSKELETAL: No joint pain or swelling; No muscle, back, or extremity pain  PSYCHIATRIC: No depression, anxiety, mood swings, or difficulty sleeping  HEME/LYMPH: No easy bruising, or bleeding gums  ALLERGY AND IMMUNOLOGIC: No hives or eczema    MEDICATIONS  (STANDING):  albumin human 25% IVPB 50 milliLiter(s) IV Intermittent <User Schedule>  artificial  tears Solution 1 Drop(s) Both EYES every 12 hours  aspirin  chewable 81 milliGRAM(s) Oral daily  ceftolozane/tazobactam IVPB 150 milliGRAM(s) IV Intermittent every 8 hours  dextrose 5%. 1000 milliLiter(s) (50 mL/Hr) IV Continuous <Continuous>  dextrose 5%. 1000 milliLiter(s) (100 mL/Hr) IV Continuous <Continuous>  dextrose 50% Injectable 12.5 Gram(s) IV Push once  dextrose 50% Injectable 25 Gram(s) IV Push once  dextrose 50% Injectable 25 Gram(s) IV Push once  diltiazem    Tablet 60 milliGRAM(s) Oral three times a day  dronabinol 2.5 milliGRAM(s) Oral two times a day before meals  glucagon  Injectable 1 milliGRAM(s) IntraMuscular once  imipramine 50 milliGRAM(s) Oral daily  insulin glargine Injectable (LANTUS) 13 Unit(s) SubCutaneous at bedtime  insulin lispro (ADMELOG) corrective regimen sliding scale   SubCutaneous three times a day before meals  insulin lispro (ADMELOG) corrective regimen sliding scale   SubCutaneous at bedtime  lidocaine   4% Patch 1 Patch Transdermal daily  metoprolol tartrate 100 milliGRAM(s) Oral two times a day  midodrine. 10 milliGRAM(s) Oral three times a day  mirtazapine 7.5 milliGRAM(s) Oral at bedtime  mupirocin 2% Nasal 1 Application(s) Both Nostrils two times a day  pantoprazole    Tablet 40 milliGRAM(s) Oral before breakfast  polyethylene glycol 3350 17 Gram(s) Oral daily  senna 2 Tablet(s) Oral at bedtime  sodium zirconium cyclosilicate 5 Gram(s) Oral daily    MEDICATIONS  (PRN):  acetaminophen     Tablet .. 650 milliGRAM(s) Oral every 6 hours PRN Temp greater or equal to 38C (100.4F), Mild Pain (1 - 3)  dextrose Oral Gel 15 Gram(s) Oral once PRN Blood Glucose LESS THAN 70 milliGRAM(s)/deciliter      ALLERGIES: No Known Drug Allergies  latex (Hives)      FAMILY HISTORY:  FH: myocardial infarction (Father)    FH: myocardial infarction (Father)    Family history of type 2 diabetes mellitus in brother (Sibling)        PHYSICAL EXAMINATION:  -----------------------------  T(C): 36.7 (07-15-23 @ 05:56), Max: 36.7 (07-14-23 @ 12:32)  HR: 78 (07-15-23 @ 05:56) (69 - 90)  BP: 125/87 (07-15-23 @ 05:56) (125/87 - 166/79)  RR: 20 (07-14-23 @ 20:01) (20 - 20)  SpO2: 95% (07-15-23 @ 05:56) (92% - 97%)  Wt(kg): --    07-14 @ 07:01  -  07-15 @ 07:00  --------------------------------------------------------  IN:    IV PiggyBack: 100 mL    IV PiggyBack: 10 mL    Oral Fluid: 350 mL  Total IN: 460 mL    OUT:    Voided (mL): 0 mL  Total OUT: 0 mL    Total NET: 460 mL            VITALS  T(C): 36.7 (07-15-23 @ 05:56), Max: 36.7 (07-14-23 @ 12:32)  HR: 78 (07-15-23 @ 05:56) (69 - 90)  BP: 125/87 (07-15-23 @ 05:56) (125/87 - 166/79)  RR: 20 (07-14-23 @ 20:01) (20 - 20)  SpO2: 95% (07-15-23 @ 05:56) (92% - 97%)    Constitutional: well developed, normal appearance, well groomed, well nourished, no deformities and no acute distress.   Eyes: the conjunctiva exhibited no abnormalities and the eyelids demonstrated no xanthelasmas.   HEENT: normal oral mucosa, no oral pallor and no oral cyanosis.   Neck: normal jugular venous A waves present, normal jugular venous V waves present and no jugular venous wilson A waves.   Pulmonary: no respiratory distress, normal respiratory rhythm and effort, no accessory muscle use and lungs were clear to auscultation bilaterally.   Cardiovascular: heart rate and rhythm were normal, normal S1 and S2 and no murmur, gallop, rub, heave or thrill are present.   Abdomen: soft, non-tender, no hepato-splenomegaly and no abdominal mass palpated.   Musculoskeletal: the gait could not be assessed..   Extremities: no clubbing of the fingernails, no localized cyanosis, no petechial hemorrhages and no ischemic changes.   Skin: normal skin color and pigmentation, no rash, no venous stasis, no skin lesions, no skin ulcer and no xanthoma was observed.   Psychiatric: oriented to person, place, and time, the affect was normal, the mood was normal and not feeling anxious.     LABS:   --------  07-14    134<L>  |  94<L>  |  80<H>  ----------------------------<  236<H>  5.3   |  26  |  7.00<H>    Ca    9.8      14 Jul 2023 11:40  Phos  7.4     07-14  Mg     2.7     07-14    TPro  7.1  /  Alb  2.3<L>  /  TBili  0.5  /  DBili  x   /  AST  30  /  ALT  18  /  AlkPhos  121<H>  07-13                         11.2   15.59 )-----------( 519      ( 14 Jul 2023 11:40 )             35.4                 RADIOLOGY:  -----------------    ECG:     ECHO: Myra Serrano(Resident)

## 2025-01-02 NOTE — PROVIDER CONTACT NOTE (OTHER) - ASSESSMENT
Conjunctivae and eyelids appear normal,  Sclerae : White without injection 
Pt is a&ox2, confused. Pt denies any pain or symptoms

## 2025-06-03 NOTE — ED ADULT NURSE NOTE - PMH
If you have any questions regarding your visit, Please contact your care team.    [facility]: [phone]      [facility] CLINIC HOURS TELEPHONE NUMBER   Jaz Cash DO.    KENNY Oconnor - Surgery Scheduler  Tai - Surgery Scheduler    NAS Vazquez RN Kylie, RN     Monday, Thursday  [place]  7am-3pm    Tuesday, Wednesday  [place]  7am-3pm    Friday  [place]  1pm-3:30pm    Typical Surgery Days: Thursday or Friday   [facility]  [address] [place] [address] [phone] Phone  [phone] Fax    [address] [address] [phone] Phone    Imaging [phone] Phone    [facility] Labor and Delivery:  [phone] Phone     **Surgeries** Our Surgery Schedulers will contact you to schedule. If you do not receive a call within 3 business days, please call [phone].    Urgent Care locations:  [facility] Saturday and    9 am - 5 pm    Monday-Friday   12 pm - 8 pm  Saturday and    9 am - 5 pm   [phone]       If you need a medication refill, please contact your pharmacy. Please allow 3 business days for your refill to be completed.  As always, Thank you for trusting us with your healthcare needs!    
CAD (coronary artery disease)    Depression    Diabetes mellitus II    h/o Anxiety attack    h/o Hepatitis A 1969  currently resolved, no symptoms  h/o Myocardial infarct 2007    h/o Smoking  quitted 3/2012  HTN (hypertension)    Murmur, cardiac    PAD (peripheral artery disease)

## (undated) DEVICE — PLV-SCD MACHINE: Type: DURABLE MEDICAL EQUIPMENT

## (undated) DEVICE — PLV/PSP-ESU FORCEFX F8I7418A: Type: DURABLE MEDICAL EQUIPMENT

## (undated) DEVICE — PACK LOWER EXTREMITY NS PLAINVI

## (undated) DEVICE — SYR LUER LOK 10CC

## (undated) DEVICE — TUNNELER SHEATH YELLOW SMALL

## (undated) DEVICE — POSITIONER FOAM HEAD CRADLE (PINK)

## (undated) DEVICE — DRSG STOCKINETTE IMPERVIOUS XL

## (undated) DEVICE — CATH IV SAFE BC 22G X 1" (BLUE)

## (undated) DEVICE — LAP PAD 18 X 18"

## (undated) DEVICE — DRAPE SPLIT SHEET 77" X 108"

## (undated) DEVICE — SOL INJ NS 0.9% 500ML BAG

## (undated) DEVICE — FLOORMAT SURGISAFE ABSORBANT WHITE 36" X 72"

## (undated) DEVICE — SUT MONOSOF 3-0 18" C-14

## (undated) DEVICE — SPONGE PEANUT AUTO COUNT

## (undated) DEVICE — VENODYNE/SCD SLEEVE CALF MEDIUM

## (undated) DEVICE — SOL IRR POUR NS 0.9% 1000ML

## (undated) DEVICE — GLV 6.5 PROTEXIS (WHITE)

## (undated) DEVICE — DRSG CURITY GAUZE SPONGE 4 X 4" 12-PLY

## (undated) DEVICE — VENODYNE/SCD SLEEVE CALF LARGE

## (undated) DEVICE — PACK CARD CATH CUSTOM

## (undated) DEVICE — DRAPE LIGHT HANDLE COVER (GREEN)

## (undated) DEVICE — VALVE CONTROL COPILOT BLEEDBACK

## (undated) DEVICE — DRSG TEGADERM 4X4.75"

## (undated) DEVICE — PREP BETADINE KIT

## (undated) DEVICE — Device

## (undated) DEVICE — LAP PAD W RING 18 X 18"

## (undated) DEVICE — COVER PROBE W/GEL 18X120CM STRL 50/BX

## (undated) DEVICE — PREP CHLORAPREP HI-LITE ORANGE 26ML

## (undated) DEVICE — NDL HYPO SAFE 18G X 1.5" (PINK)

## (undated) DEVICE — PACK AV FISTULA

## (undated) DEVICE — SAW BLADE LINVATEC SAGITTAL MIC 9.5X25.5X0.4MM

## (undated) DEVICE — DRAPE TOWEL BLUE 17" X 24"

## (undated) DEVICE — SUT SOFSILK 4-0 30" TIES

## (undated) DEVICE — DRSG STERISTRIPS 0.5 X 4"

## (undated) DEVICE — DRAPE 3/4 SHEET W REINFORCEMENT 56X77"

## (undated) DEVICE — BLADE SCALPEL SAFETY #11 WITH PLASTIC GREEN HANDLE

## (undated) DEVICE — SUT MONOSOF 3-0 18" P-14

## (undated) DEVICE — SUT POLYSORB 3-0 30" V-20 UNDYED

## (undated) DEVICE — PLASTIC SOLUTION BOWL 160Z

## (undated) DEVICE — CLAMP BULLDOG MAXI 45 DEGREE (ORANGE) DISP

## (undated) DEVICE — STAPLER SKIN PROXIMATE

## (undated) DEVICE — SUT SOFSILK 2-0 BLACK 12X18 PRE-CUT

## (undated) DEVICE — SYR LUER LOK 3CC

## (undated) DEVICE — DRSG MASTISOL

## (undated) DEVICE — DRSG PREVENA PLUS SYSTEM

## (undated) DEVICE — CLAMP BULLDOG MIDI 45 DEGREE (GREEN) DISP

## (undated) DEVICE — DRSG TAPE UMBILICAL COTTON 2" X 30 X 1/8"

## (undated) DEVICE — FOLEY TRAY 16FR LF URINE METER SURESTEP

## (undated) DEVICE — SPECIMEN CONTAINER 4OZ

## (undated) DEVICE — WARMING BLANKET LOWER ADULT

## (undated) DEVICE — DRSG COMBINE 5X9"

## (undated) DEVICE — GLV 7.5 PROTEXIS (WHITE)

## (undated) DEVICE — POSITIONER STRAP ARMBOARD VELCRO TS-30

## (undated) DEVICE — SUT SOFSILK 4-0 12X18" TIES

## (undated) DEVICE — CONTAINER SPECIMEN 4OZ

## (undated) DEVICE — TUNNELER SHEATH GREEN LARGE

## (undated) DEVICE — SUT POLYSORB 2-0 30" GS-22 UNDYED

## (undated) DEVICE — CLAMP SURG SFT-FBR 33MM

## (undated) DEVICE — SUT POLYSORB 4-0 18" P-12 UNDYED

## (undated) DEVICE — SUT PROLENE 6-0 30" BV-1

## (undated) DEVICE — SUT POLYSORB 2-0 60" REEL

## (undated) DEVICE — SOL IRR POUR H2O 1000ML

## (undated) DEVICE — WARMING BLANKET UPPER ADULT

## (undated) DEVICE — MEDICATION LABELS W MARKER

## (undated) DEVICE — SUT SOFSILK 2-0 30" TIES

## (undated) DEVICE — VISITEC 4X4

## (undated) DEVICE — DRAPE ISOLATION BAG 20X20"

## (undated) DEVICE — DRSG TELFA 3 X 8

## (undated) DEVICE — BLADE SCALPEL SAFETYLOCK #11

## (undated) DEVICE — SUT MONOCRYL 4-0 27" PS-2 UNDYED

## (undated) DEVICE — SUT MONOSOF 4-0 18" P-12

## (undated) DEVICE — NDL HYPO SAFE 22G X 1.5" (BLACK)

## (undated) DEVICE — TUBING HI PRESURE NONBRAID M/F 72"

## (undated) DEVICE — BUR MICROAIRE CARBIDE OVAL 4MM 8 FLUTE

## (undated) DEVICE — DRSG TEGADERM 2.5X3"

## (undated) DEVICE — STOPCOCK HIGH PRESS 3 WAY

## (undated) DEVICE — SUT POLYSORB 3-0 60" REEL

## (undated) DEVICE — VESSEL LOOP MAXI-BLUE 0.120" X 16"

## (undated) DEVICE — BAG SPONGE COUNTER EZ

## (undated) DEVICE — MARKING PEN W RULER

## (undated) DEVICE — BLADE SCALPEL SAFETYLOCK #10

## (undated) DEVICE — DRSG XEROFORM 1 X 8"

## (undated) DEVICE — SUT POLYSORB 2-0 30" V-20 UNDYED

## (undated) DEVICE — TORQUE DEVICE FOR GUIDEWIRE 0.0100.038"

## (undated) DEVICE — ELCTR BOVIE TIP BLADE INSULATED 6.5" EDGE

## (undated) DEVICE — SPECIMEN CONTAINER 100ML

## (undated) DEVICE — BULLDOG SPRING CLIP 6MM SOFT/SOFT

## (undated) DEVICE — DRSG KERLIX ROLL 4.5"

## (undated) DEVICE — SUT MONOSOF 2-0 18" C-15

## (undated) DEVICE — SUT SOFSILK 3-0 12X18" TIES

## (undated) DEVICE — SYR LUER LOK 20CC

## (undated) DEVICE — STAPLER SKIN VISI-STAT 35 WIDE

## (undated) DEVICE — BAG DECANTER IV STERILE

## (undated) DEVICE — DRAPE IOBAN 23" X 23"

## (undated) DEVICE — TAPE GLO-N-TELL RADIOPAQUE 20 STRIPS

## (undated) DEVICE — PREP ALCOHOL PAD MED

## (undated) DEVICE — SOL INJ NS 0.9% 500ML 1-PORT

## (undated) DEVICE — SUT PROLENE 6-0 30" RB-2

## (undated) DEVICE — DRSG DERMABOND 0.7ML

## (undated) DEVICE — SUT SOFSILK 0 30" TIES

## (undated) DEVICE — SAW BLADE LINVATEC SAGITTAL 19.5X41.0X..4MM COARSE

## (undated) DEVICE — BLADE SCALPEL SAFETYLOCK #15

## (undated) DEVICE — SUT SURGIPRO II 6-0 24" CV-11 DA

## (undated) DEVICE — SUT SOFSILK 3-0 30" TIES

## (undated) DEVICE — SUT PROLENE 6-0 24" BV-1

## (undated) DEVICE — INFLATOR ENCORE 26

## (undated) DEVICE — DRAPE FEMORAL ANGIOGRAPHY W TROUGH